# Patient Record
Sex: FEMALE | Race: WHITE | NOT HISPANIC OR LATINO | Employment: OTHER | URBAN - METROPOLITAN AREA
[De-identification: names, ages, dates, MRNs, and addresses within clinical notes are randomized per-mention and may not be internally consistent; named-entity substitution may affect disease eponyms.]

---

## 2017-01-12 ENCOUNTER — TRANSCRIBE ORDERS (OUTPATIENT)
Dept: LAB | Facility: HOSPITAL | Age: 72
End: 2017-01-12

## 2017-01-12 ENCOUNTER — APPOINTMENT (OUTPATIENT)
Dept: LAB | Facility: HOSPITAL | Age: 72
End: 2017-01-12
Attending: INTERNAL MEDICINE
Payer: COMMERCIAL

## 2017-01-12 DIAGNOSIS — N18.4 CHRONIC KIDNEY DISEASE, STAGE IV (SEVERE) (HCC): ICD-10-CM

## 2017-01-12 DIAGNOSIS — I26.92 SADDLE EMBOLUS OF PULMONARY ARTERY WITHOUT ACUTE COR PULMONALE (HCC): ICD-10-CM

## 2017-01-12 LAB
ALBUMIN SERPL BCP-MCNC: 3.6 G/DL (ref 3.5–5)
ALP SERPL-CCNC: 179 U/L (ref 46–116)
ALT SERPL W P-5'-P-CCNC: 16 U/L (ref 12–78)
ANION GAP SERPL CALCULATED.3IONS-SCNC: 8 MMOL/L (ref 4–13)
AST SERPL W P-5'-P-CCNC: 27 U/L (ref 5–45)
BASOPHILS # BLD AUTO: 0.24 THOUSANDS/ΜL (ref 0–0.1)
BASOPHILS NFR BLD AUTO: 3 % (ref 0–1)
BILIRUB SERPL-MCNC: 0.38 MG/DL (ref 0.2–1)
BUN SERPL-MCNC: 50 MG/DL (ref 5–25)
CALCIUM SERPL-MCNC: 8.6 MG/DL (ref 8.3–10.1)
CHLORIDE SERPL-SCNC: 114 MMOL/L (ref 100–108)
CO2 SERPL-SCNC: 18 MMOL/L (ref 21–32)
CREAT SERPL-MCNC: 3.08 MG/DL (ref 0.6–1.3)
EOSINOPHIL # BLD AUTO: 0.26 THOUSAND/ΜL (ref 0–0.61)
EOSINOPHIL NFR BLD AUTO: 3 % (ref 0–6)
ERYTHROCYTE [DISTWIDTH] IN BLOOD BY AUTOMATED COUNT: 17.7 % (ref 11.6–15.1)
GFR SERPL CREATININE-BSD FRML MDRD: 15 ML/MIN/1.73SQ M
GLUCOSE SERPL-MCNC: 96 MG/DL (ref 65–140)
HCT VFR BLD AUTO: 35.1 % (ref 34.8–46.1)
HGB BLD-MCNC: 11.4 G/DL (ref 11.5–15.4)
LYMPHOCYTES # BLD AUTO: 1.79 THOUSANDS/ΜL (ref 0.6–4.47)
LYMPHOCYTES NFR BLD AUTO: 19 % (ref 14–44)
MCH RBC QN AUTO: 27.9 PG (ref 26.8–34.3)
MCHC RBC AUTO-ENTMCNC: 32.5 G/DL (ref 31.4–37.4)
MCV RBC AUTO: 86 FL (ref 82–98)
MONOCYTES # BLD AUTO: 0.29 THOUSAND/ΜL (ref 0.17–1.22)
MONOCYTES NFR BLD AUTO: 3 % (ref 4–12)
NEUTROPHILS # BLD AUTO: 6.7 THOUSANDS/ΜL (ref 1.85–7.62)
NEUTS SEG NFR BLD AUTO: 72 % (ref 43–75)
NRBC BLD AUTO-RTO: 0 /100 WBCS
PHOSPHATE SERPL-MCNC: 4.1 MG/DL (ref 2.3–4.1)
PLATELET # BLD AUTO: 321 THOUSANDS/UL (ref 149–390)
PMV BLD AUTO: 10.5 FL (ref 8.9–12.7)
POTASSIUM SERPL-SCNC: 4.4 MMOL/L (ref 3.5–5.3)
PROT SERPL-MCNC: 7.3 G/DL (ref 6.4–8.2)
PTH-INTACT SERPL-MCNC: 182.4 PG/ML (ref 14–72)
RBC # BLD AUTO: 4.09 MILLION/UL (ref 3.81–5.12)
SODIUM SERPL-SCNC: 140 MMOL/L (ref 136–145)
WBC # BLD AUTO: 9.37 THOUSAND/UL (ref 4.31–10.16)

## 2017-01-12 PROCEDURE — 80053 COMPREHEN METABOLIC PANEL: CPT

## 2017-01-12 PROCEDURE — 83970 ASSAY OF PARATHORMONE: CPT

## 2017-01-12 PROCEDURE — 36415 COLL VENOUS BLD VENIPUNCTURE: CPT

## 2017-01-12 PROCEDURE — 84100 ASSAY OF PHOSPHORUS: CPT

## 2017-01-12 PROCEDURE — 85025 COMPLETE CBC W/AUTO DIFF WBC: CPT

## 2017-01-17 ENCOUNTER — ALLSCRIPTS OFFICE VISIT (OUTPATIENT)
Dept: OTHER | Facility: OTHER | Age: 72
End: 2017-01-17

## 2017-01-17 DIAGNOSIS — D45 POLYCYTHEMIA VERA (HCC): ICD-10-CM

## 2017-01-23 ENCOUNTER — ALLSCRIPTS OFFICE VISIT (OUTPATIENT)
Dept: OTHER | Facility: OTHER | Age: 72
End: 2017-01-23

## 2017-01-23 ENCOUNTER — GENERIC CONVERSION - ENCOUNTER (OUTPATIENT)
Dept: OTHER | Facility: OTHER | Age: 72
End: 2017-01-23

## 2017-02-24 ENCOUNTER — GENERIC CONVERSION - ENCOUNTER (OUTPATIENT)
Dept: OTHER | Facility: OTHER | Age: 72
End: 2017-02-24

## 2017-03-20 ENCOUNTER — APPOINTMENT (OUTPATIENT)
Dept: LAB | Facility: HOSPITAL | Age: 72
End: 2017-03-20
Attending: INTERNAL MEDICINE
Payer: COMMERCIAL

## 2017-03-20 ENCOUNTER — TRANSCRIBE ORDERS (OUTPATIENT)
Dept: LAB | Facility: HOSPITAL | Age: 72
End: 2017-03-20

## 2017-03-20 DIAGNOSIS — I10 ESSENTIAL HYPERTENSION, MALIGNANT: Primary | ICD-10-CM

## 2017-03-20 DIAGNOSIS — R19.7 DIARRHEA, UNSPECIFIED TYPE: ICD-10-CM

## 2017-03-20 DIAGNOSIS — N18.4 CHRONIC KIDNEY DISEASE, STAGE IV (SEVERE) (HCC): ICD-10-CM

## 2017-03-20 DIAGNOSIS — I10 ESSENTIAL HYPERTENSION, MALIGNANT: ICD-10-CM

## 2017-03-20 DIAGNOSIS — D45 POLYCYTHEMIA VERA(238.4): ICD-10-CM

## 2017-03-20 LAB
ALBUMIN SERPL BCP-MCNC: 3.6 G/DL (ref 3.5–5)
ANION GAP SERPL CALCULATED.3IONS-SCNC: 10 MMOL/L (ref 4–13)
BUN SERPL-MCNC: 69 MG/DL (ref 5–25)
CALCIUM SERPL-MCNC: 8.7 MG/DL (ref 8.3–10.1)
CHLORIDE SERPL-SCNC: 114 MMOL/L (ref 100–108)
CO2 SERPL-SCNC: 19 MMOL/L (ref 21–32)
CREAT SERPL-MCNC: 3.36 MG/DL (ref 0.6–1.3)
GFR SERPL CREATININE-BSD FRML MDRD: 13.5 ML/MIN/1.73SQ M
GLUCOSE SERPL-MCNC: 98 MG/DL (ref 65–140)
PHOSPHATE SERPL-MCNC: 4.4 MG/DL (ref 2.3–4.1)
POTASSIUM SERPL-SCNC: 4.2 MMOL/L (ref 3.5–5.3)
PTH-INTACT SERPL-MCNC: 123.7 PG/ML (ref 14–72)
SODIUM SERPL-SCNC: 143 MMOL/L (ref 136–145)

## 2017-03-20 PROCEDURE — 83970 ASSAY OF PARATHORMONE: CPT

## 2017-03-20 PROCEDURE — 80069 RENAL FUNCTION PANEL: CPT

## 2017-03-20 PROCEDURE — 36415 COLL VENOUS BLD VENIPUNCTURE: CPT

## 2017-03-29 ENCOUNTER — GENERIC CONVERSION - ENCOUNTER (OUTPATIENT)
Dept: OTHER | Facility: OTHER | Age: 72
End: 2017-03-29

## 2017-04-10 ENCOUNTER — APPOINTMENT (OUTPATIENT)
Dept: LAB | Facility: HOSPITAL | Age: 72
End: 2017-04-10
Attending: INTERNAL MEDICINE
Payer: COMMERCIAL

## 2017-04-10 DIAGNOSIS — R19.7 DIARRHEA, UNSPECIFIED TYPE: ICD-10-CM

## 2017-04-10 DIAGNOSIS — D45 POLYCYTHEMIA VERA (HCC): ICD-10-CM

## 2017-04-10 LAB
ALBUMIN SERPL BCP-MCNC: 3.4 G/DL (ref 3.5–5)
ALP SERPL-CCNC: 98 U/L (ref 46–116)
ALT SERPL W P-5'-P-CCNC: 15 U/L (ref 12–78)
ANION GAP SERPL CALCULATED.3IONS-SCNC: 9 MMOL/L (ref 4–13)
AST SERPL W P-5'-P-CCNC: 22 U/L (ref 5–45)
BASOPHILS # BLD AUTO: 0.2 THOUSANDS/ΜL (ref 0–0.1)
BASOPHILS NFR BLD AUTO: 2 % (ref 0–1)
BILIRUB SERPL-MCNC: 0.37 MG/DL (ref 0.2–1)
BUN SERPL-MCNC: 43 MG/DL (ref 5–25)
CALCIUM SERPL-MCNC: 8.3 MG/DL (ref 8.3–10.1)
CHLORIDE SERPL-SCNC: 113 MMOL/L (ref 100–108)
CO2 SERPL-SCNC: 19 MMOL/L (ref 21–32)
CREAT SERPL-MCNC: 3.09 MG/DL (ref 0.6–1.3)
EOSINOPHIL # BLD AUTO: 0.2 THOUSAND/ΜL (ref 0–0.61)
EOSINOPHIL NFR BLD AUTO: 2 % (ref 0–6)
ERYTHROCYTE [DISTWIDTH] IN BLOOD BY AUTOMATED COUNT: 16.4 % (ref 11.6–15.1)
GFR SERPL CREATININE-BSD FRML MDRD: 14.9 ML/MIN/1.73SQ M
GLUCOSE P FAST SERPL-MCNC: 91 MG/DL (ref 65–99)
HCT VFR BLD AUTO: 31.5 % (ref 34.8–46.1)
HGB BLD-MCNC: 10.3 G/DL (ref 11.5–15.4)
LYMPHOCYTES # BLD AUTO: 1.7 THOUSANDS/ΜL (ref 0.6–4.47)
LYMPHOCYTES NFR BLD AUTO: 19 % (ref 14–44)
MCH RBC QN AUTO: 28.9 PG (ref 26.8–34.3)
MCHC RBC AUTO-ENTMCNC: 32.7 G/DL (ref 31.4–37.4)
MCV RBC AUTO: 88 FL (ref 82–98)
MONOCYTES # BLD AUTO: 0.31 THOUSAND/ΜL (ref 0.17–1.22)
MONOCYTES NFR BLD AUTO: 3 % (ref 4–12)
NEUTROPHILS # BLD AUTO: 6.62 THOUSANDS/ΜL (ref 1.85–7.62)
NEUTS SEG NFR BLD AUTO: 74 % (ref 43–75)
NRBC BLD AUTO-RTO: 0 /100 WBCS
PLATELET # BLD AUTO: 281 THOUSANDS/UL (ref 149–390)
PMV BLD AUTO: 10.3 FL (ref 8.9–12.7)
POTASSIUM SERPL-SCNC: 4.3 MMOL/L (ref 3.5–5.3)
PROT SERPL-MCNC: 6.6 G/DL (ref 6.4–8.2)
RBC # BLD AUTO: 3.57 MILLION/UL (ref 3.81–5.12)
SODIUM SERPL-SCNC: 141 MMOL/L (ref 136–145)
WBC # BLD AUTO: 9.08 THOUSAND/UL (ref 4.31–10.16)

## 2017-04-10 PROCEDURE — 36415 COLL VENOUS BLD VENIPUNCTURE: CPT

## 2017-04-10 PROCEDURE — 85025 COMPLETE CBC W/AUTO DIFF WBC: CPT

## 2017-04-10 PROCEDURE — 80053 COMPREHEN METABOLIC PANEL: CPT

## 2017-04-11 ENCOUNTER — APPOINTMENT (OUTPATIENT)
Dept: LAB | Facility: HOSPITAL | Age: 72
End: 2017-04-11
Attending: INTERNAL MEDICINE
Payer: COMMERCIAL

## 2017-04-11 ENCOUNTER — TRANSCRIBE ORDERS (OUTPATIENT)
Dept: LAB | Facility: HOSPITAL | Age: 72
End: 2017-04-11

## 2017-04-11 DIAGNOSIS — R19.7 DIARRHEA, UNSPECIFIED TYPE: Primary | ICD-10-CM

## 2017-04-11 DIAGNOSIS — R19.7 DIARRHEA, UNSPECIFIED TYPE: ICD-10-CM

## 2017-04-11 LAB — C DIFF TOX GENS STL QL NAA+PROBE: ABNORMAL

## 2017-04-11 PROCEDURE — 87493 C DIFF AMPLIFIED PROBE: CPT

## 2017-04-18 ENCOUNTER — ALLSCRIPTS OFFICE VISIT (OUTPATIENT)
Dept: OTHER | Facility: OTHER | Age: 72
End: 2017-04-18

## 2017-04-21 ENCOUNTER — ALLSCRIPTS OFFICE VISIT (OUTPATIENT)
Dept: OTHER | Facility: OTHER | Age: 72
End: 2017-04-21

## 2017-05-30 ENCOUNTER — HOSPITAL ENCOUNTER (OUTPATIENT)
Dept: RADIOLOGY | Facility: HOSPITAL | Age: 72
Discharge: HOME | End: 2017-05-30
Attending: UROLOGY
Payer: COMMERCIAL

## 2017-05-30 DIAGNOSIS — N18.4 CHRONIC KIDNEY DISEASE, STAGE IV (SEVERE) (HCC): ICD-10-CM

## 2017-05-30 PROCEDURE — 76770 US EXAM ABDO BACK WALL COMP: CPT

## 2017-06-01 ENCOUNTER — TRANSCRIBE ORDERS (OUTPATIENT)
Dept: LAB | Facility: HOSPITAL | Age: 72
End: 2017-06-01

## 2017-06-01 ENCOUNTER — APPOINTMENT (OUTPATIENT)
Dept: LAB | Facility: HOSPITAL | Age: 72
End: 2017-06-01
Attending: INTERNAL MEDICINE
Payer: COMMERCIAL

## 2017-06-01 ENCOUNTER — GENERIC CONVERSION - ENCOUNTER (OUTPATIENT)
Dept: OTHER | Facility: OTHER | Age: 72
End: 2017-06-01

## 2017-06-01 DIAGNOSIS — N18.4 CHRONIC KIDNEY DISEASE, STAGE IV (SEVERE) (HCC): ICD-10-CM

## 2017-06-01 LAB
ALBUMIN SERPL BCP-MCNC: 3.6 G/DL (ref 3.5–5)
ANION GAP SERPL CALCULATED.3IONS-SCNC: 6 MMOL/L (ref 4–13)
BASOPHILS # BLD AUTO: 0.15 THOUSANDS/ΜL (ref 0–0.1)
BASOPHILS NFR BLD AUTO: 2 % (ref 0–1)
BUN SERPL-MCNC: 46 MG/DL (ref 5–25)
CALCIUM SERPL-MCNC: 8.8 MG/DL (ref 8.3–10.1)
CHLORIDE SERPL-SCNC: 112 MMOL/L (ref 100–108)
CO2 SERPL-SCNC: 23 MMOL/L (ref 21–32)
CREAT SERPL-MCNC: 3.01 MG/DL (ref 0.6–1.3)
EOSINOPHIL # BLD AUTO: 0.19 THOUSAND/ΜL (ref 0–0.61)
EOSINOPHIL NFR BLD AUTO: 3 % (ref 0–6)
ERYTHROCYTE [DISTWIDTH] IN BLOOD BY AUTOMATED COUNT: 15.4 % (ref 11.6–15.1)
GFR SERPL CREATININE-BSD FRML MDRD: 15.3 ML/MIN/1.73SQ M
GLUCOSE P FAST SERPL-MCNC: 85 MG/DL (ref 65–99)
HCT VFR BLD AUTO: 33.5 % (ref 34.8–46.1)
HGB BLD-MCNC: 11 G/DL (ref 11.5–15.4)
LYMPHOCYTES # BLD AUTO: 1.17 THOUSANDS/ΜL (ref 0.6–4.47)
LYMPHOCYTES NFR BLD AUTO: 16 % (ref 14–44)
MCH RBC QN AUTO: 29.2 PG (ref 26.8–34.3)
MCHC RBC AUTO-ENTMCNC: 32.8 G/DL (ref 31.4–37.4)
MCV RBC AUTO: 89 FL (ref 82–98)
MONOCYTES # BLD AUTO: 0.28 THOUSAND/ΜL (ref 0.17–1.22)
MONOCYTES NFR BLD AUTO: 4 % (ref 4–12)
NEUTROPHILS # BLD AUTO: 5.69 THOUSANDS/ΜL (ref 1.85–7.62)
NEUTS SEG NFR BLD AUTO: 75 % (ref 43–75)
NRBC BLD AUTO-RTO: 0 /100 WBCS
PHOSPHATE SERPL-MCNC: 4.2 MG/DL (ref 2.3–4.1)
PLATELET # BLD AUTO: 285 THOUSANDS/UL (ref 149–390)
PMV BLD AUTO: 10.4 FL (ref 8.9–12.7)
POTASSIUM SERPL-SCNC: 4.3 MMOL/L (ref 3.5–5.3)
PTH-INTACT SERPL-MCNC: 128.1 PG/ML (ref 14–72)
RBC # BLD AUTO: 3.77 MILLION/UL (ref 3.81–5.12)
SODIUM SERPL-SCNC: 141 MMOL/L (ref 136–145)
WBC # BLD AUTO: 7.52 THOUSAND/UL (ref 4.31–10.16)

## 2017-06-01 PROCEDURE — 80069 RENAL FUNCTION PANEL: CPT

## 2017-06-01 PROCEDURE — 85025 COMPLETE CBC W/AUTO DIFF WBC: CPT

## 2017-06-01 PROCEDURE — 36415 COLL VENOUS BLD VENIPUNCTURE: CPT

## 2017-06-01 PROCEDURE — 83970 ASSAY OF PARATHORMONE: CPT

## 2017-06-20 ENCOUNTER — TRANSCRIBE ORDERS (OUTPATIENT)
Dept: ADMINISTRATIVE | Facility: HOSPITAL | Age: 72
End: 2017-06-20

## 2017-06-20 ENCOUNTER — ALLSCRIPTS OFFICE VISIT (OUTPATIENT)
Dept: OTHER | Facility: OTHER | Age: 72
End: 2017-06-20

## 2017-06-20 DIAGNOSIS — N13.30 HYDRONEPHROSIS, UNSPECIFIED HYDRONEPHROSIS TYPE: Primary | ICD-10-CM

## 2017-07-07 ENCOUNTER — ALLSCRIPTS OFFICE VISIT (OUTPATIENT)
Dept: OTHER | Facility: OTHER | Age: 72
End: 2017-07-07

## 2017-07-13 DIAGNOSIS — D45 POLYCYTHEMIA VERA(238.4): ICD-10-CM

## 2017-07-14 ENCOUNTER — TRANSCRIBE ORDERS (OUTPATIENT)
Dept: LAB | Facility: HOSPITAL | Age: 72
End: 2017-07-14

## 2017-07-14 ENCOUNTER — APPOINTMENT (OUTPATIENT)
Dept: LAB | Facility: HOSPITAL | Age: 72
End: 2017-07-14
Attending: INTERNAL MEDICINE
Payer: COMMERCIAL

## 2017-07-14 DIAGNOSIS — D45 POLYCYTHEMIA VERA(238.4): ICD-10-CM

## 2017-07-14 LAB
ALBUMIN SERPL BCP-MCNC: 3.5 G/DL (ref 3.5–5)
ALP SERPL-CCNC: 129 U/L (ref 46–116)
ALT SERPL W P-5'-P-CCNC: 18 U/L (ref 12–78)
ANION GAP SERPL CALCULATED.3IONS-SCNC: 9 MMOL/L (ref 4–13)
AST SERPL W P-5'-P-CCNC: 27 U/L (ref 5–45)
BASOPHILS # BLD MANUAL: 0.13 THOUSAND/UL (ref 0–0.1)
BASOPHILS NFR MAR MANUAL: 2 % (ref 0–1)
BILIRUB SERPL-MCNC: 0.53 MG/DL (ref 0.2–1)
BUN SERPL-MCNC: 46 MG/DL (ref 5–25)
CALCIUM SERPL-MCNC: 8.7 MG/DL (ref 8.3–10.1)
CHLORIDE SERPL-SCNC: 112 MMOL/L (ref 100–108)
CO2 SERPL-SCNC: 19 MMOL/L (ref 21–32)
CREAT SERPL-MCNC: 3.08 MG/DL (ref 0.6–1.3)
EOSINOPHIL # BLD MANUAL: 0.07 THOUSAND/UL (ref 0–0.4)
EOSINOPHIL NFR BLD MANUAL: 1 % (ref 0–6)
ERYTHROCYTE [DISTWIDTH] IN BLOOD BY AUTOMATED COUNT: 15.1 % (ref 11.6–15.1)
GFR SERPL CREATININE-BSD FRML MDRD: 14.9 ML/MIN/1.73SQ M
GLUCOSE P FAST SERPL-MCNC: 85 MG/DL (ref 65–99)
HCT VFR BLD AUTO: 34.1 % (ref 34.8–46.1)
HGB BLD-MCNC: 11.2 G/DL (ref 11.5–15.4)
LYMPHOCYTES # BLD AUTO: 0.93 THOUSAND/UL (ref 0.6–4.47)
LYMPHOCYTES # BLD AUTO: 14 % (ref 14–44)
MCH RBC QN AUTO: 29.6 PG (ref 26.8–34.3)
MCHC RBC AUTO-ENTMCNC: 32.8 G/DL (ref 31.4–37.4)
MCV RBC AUTO: 90 FL (ref 82–98)
METAMYELOCYTES NFR BLD MANUAL: 1 % (ref 0–1)
MONOCYTES # BLD AUTO: 0.2 THOUSAND/UL (ref 0–1.22)
MONOCYTES NFR BLD: 3 % (ref 4–12)
NEUTROPHILS # BLD MANUAL: 5.26 THOUSAND/UL (ref 1.85–7.62)
NEUTS BAND NFR BLD MANUAL: 1 % (ref 0–8)
NEUTS SEG NFR BLD AUTO: 78 % (ref 43–75)
NRBC BLD AUTO-RTO: 0 /100 WBCS
PLATELET # BLD AUTO: 279 THOUSANDS/UL (ref 149–390)
PLATELET BLD QL SMEAR: ADEQUATE
PMV BLD AUTO: 10.7 FL (ref 8.9–12.7)
POIKILOCYTOSIS BLD QL SMEAR: PRESENT
POLYCHROMASIA BLD QL SMEAR: PRESENT
POTASSIUM SERPL-SCNC: 4.3 MMOL/L (ref 3.5–5.3)
PROT SERPL-MCNC: 6.9 G/DL (ref 6.4–8.2)
RBC # BLD AUTO: 3.78 MILLION/UL (ref 3.81–5.12)
RBC MORPH BLD: PRESENT
SODIUM SERPL-SCNC: 140 MMOL/L (ref 136–145)
WBC # BLD AUTO: 6.66 THOUSAND/UL (ref 4.31–10.16)

## 2017-07-14 PROCEDURE — 80053 COMPREHEN METABOLIC PANEL: CPT

## 2017-07-14 PROCEDURE — 85027 COMPLETE CBC AUTOMATED: CPT

## 2017-07-14 PROCEDURE — 36415 COLL VENOUS BLD VENIPUNCTURE: CPT

## 2017-07-14 PROCEDURE — 85007 BL SMEAR W/DIFF WBC COUNT: CPT

## 2017-07-18 ENCOUNTER — ALLSCRIPTS OFFICE VISIT (OUTPATIENT)
Dept: OTHER | Facility: OTHER | Age: 72
End: 2017-07-18

## 2017-08-21 ENCOUNTER — GENERIC CONVERSION - ENCOUNTER (OUTPATIENT)
Dept: OTHER | Facility: OTHER | Age: 72
End: 2017-08-21

## 2017-08-31 ENCOUNTER — GENERIC CONVERSION - ENCOUNTER (OUTPATIENT)
Dept: OTHER | Facility: OTHER | Age: 72
End: 2017-08-31

## 2017-10-01 DIAGNOSIS — N18.4 CHRONIC KIDNEY DISEASE, STAGE IV (SEVERE) (HCC): ICD-10-CM

## 2017-10-01 DIAGNOSIS — D45 POLYCYTHEMIA VERA (HCC): ICD-10-CM

## 2017-10-13 ENCOUNTER — APPOINTMENT (OUTPATIENT)
Dept: LAB | Facility: HOSPITAL | Age: 72
End: 2017-10-13
Attending: INTERNAL MEDICINE
Payer: COMMERCIAL

## 2017-10-13 ENCOUNTER — TRANSCRIBE ORDERS (OUTPATIENT)
Dept: LAB | Facility: HOSPITAL | Age: 72
End: 2017-10-13

## 2017-10-13 DIAGNOSIS — D45 POLYCYTHEMIA VERA (HCC): ICD-10-CM

## 2017-10-13 DIAGNOSIS — N18.4 CHRONIC KIDNEY DISEASE, STAGE IV (SEVERE) (HCC): ICD-10-CM

## 2017-10-13 LAB
ALBUMIN SERPL BCP-MCNC: 3.7 G/DL (ref 3.5–5)
ALP SERPL-CCNC: 90 U/L (ref 46–116)
ALT SERPL W P-5'-P-CCNC: 15 U/L (ref 12–78)
ANION GAP SERPL CALCULATED.3IONS-SCNC: 9 MMOL/L (ref 4–13)
AST SERPL W P-5'-P-CCNC: 23 U/L (ref 5–45)
BASOPHILS # BLD AUTO: 0.08 THOUSANDS/ΜL (ref 0–0.1)
BASOPHILS NFR BLD AUTO: 1 % (ref 0–1)
BILIRUB SERPL-MCNC: 0.52 MG/DL (ref 0.2–1)
BUN SERPL-MCNC: 47 MG/DL (ref 5–25)
CALCIUM SERPL-MCNC: 8.7 MG/DL (ref 8.3–10.1)
CHLORIDE SERPL-SCNC: 109 MMOL/L (ref 100–108)
CO2 SERPL-SCNC: 22 MMOL/L (ref 21–32)
CREAT SERPL-MCNC: 2.93 MG/DL (ref 0.6–1.3)
EOSINOPHIL # BLD AUTO: 0.1 THOUSAND/ΜL (ref 0–0.61)
EOSINOPHIL NFR BLD AUTO: 2 % (ref 0–6)
ERYTHROCYTE [DISTWIDTH] IN BLOOD BY AUTOMATED COUNT: 14.5 % (ref 11.6–15.1)
GFR SERPL CREATININE-BSD FRML MDRD: 15 ML/MIN/1.73SQ M
GLUCOSE P FAST SERPL-MCNC: 92 MG/DL (ref 65–99)
HCT VFR BLD AUTO: 34.9 % (ref 34.8–46.1)
HGB BLD-MCNC: 11.4 G/DL (ref 11.5–15.4)
LYMPHOCYTES # BLD AUTO: 1.37 THOUSANDS/ΜL (ref 0.6–4.47)
LYMPHOCYTES NFR BLD AUTO: 23 % (ref 14–44)
MCH RBC QN AUTO: 29.3 PG (ref 26.8–34.3)
MCHC RBC AUTO-ENTMCNC: 32.7 G/DL (ref 31.4–37.4)
MCV RBC AUTO: 90 FL (ref 82–98)
MONOCYTES # BLD AUTO: 0.17 THOUSAND/ΜL (ref 0.17–1.22)
MONOCYTES NFR BLD AUTO: 3 % (ref 4–12)
NEUTROPHILS # BLD AUTO: 4.14 THOUSANDS/ΜL (ref 1.85–7.62)
NEUTS SEG NFR BLD AUTO: 71 % (ref 43–75)
NRBC BLD AUTO-RTO: 0 /100 WBCS
PHOSPHATE SERPL-MCNC: 4 MG/DL (ref 2.3–4.1)
PLATELET # BLD AUTO: 281 THOUSANDS/UL (ref 149–390)
PMV BLD AUTO: 10 FL (ref 8.9–12.7)
POTASSIUM SERPL-SCNC: 4.3 MMOL/L (ref 3.5–5.3)
PROT SERPL-MCNC: 6.9 G/DL (ref 6.4–8.2)
PTH-INTACT SERPL-MCNC: 143.9 PG/ML (ref 14–72)
RBC # BLD AUTO: 3.89 MILLION/UL (ref 3.81–5.12)
SODIUM SERPL-SCNC: 140 MMOL/L (ref 136–145)
WBC # BLD AUTO: 5.9 THOUSAND/UL (ref 4.31–10.16)

## 2017-10-13 PROCEDURE — 83970 ASSAY OF PARATHORMONE: CPT

## 2017-10-13 PROCEDURE — 36415 COLL VENOUS BLD VENIPUNCTURE: CPT

## 2017-10-13 PROCEDURE — 80053 COMPREHEN METABOLIC PANEL: CPT

## 2017-10-13 PROCEDURE — 85025 COMPLETE CBC W/AUTO DIFF WBC: CPT

## 2017-10-13 PROCEDURE — 84100 ASSAY OF PHOSPHORUS: CPT

## 2017-10-17 ENCOUNTER — ALLSCRIPTS OFFICE VISIT (OUTPATIENT)
Dept: OTHER | Facility: OTHER | Age: 72
End: 2017-10-17

## 2017-11-01 NOTE — PROGRESS NOTES
Assessment  1  Polycythemia vera (238 4) (D45)    Plan  Polycythemia vera    · (1) CBC/PLT/DIFF; Status:Active; Requested ZKW:63ACO1679;    Perform:[facility]; Due:17Oct2018; Ordered; For:Polycythemia vera; Ordered By:Rahul Henry);   · (1) COMPREHENSIVE METABOLIC PANEL; Status:Active; Requested SRP:47UIY3486;    Perform:[facility]; Due:17Oct2018; Ordered;vera; Ordered By:Rahul Henry); · Follow-up visit in 6 months Evaluation and Treatment  Follow-up  Status: Hold For -Scheduling  Requested for: 27UQB0651   Ordered; Polycythemia vera; Ordered By: Polo New) Performed:  Due: 97XNX8952    Discussion/Summary  Discussion Summary:   1  Polycythemia vera positive for JAK2 mutation, on approximately day 10 mg p o  daily since July 2016 with excellent tolerance, disappearance of splenomegaly, improvement of constitutional symptoms, continue treatmentHistory of PE, massive, on apixaban 2 5 mg p o  daily because of chronic kidney disease grade 3-4, no evidence of recurrent DVT or PEFollow-up in 6 months with CBC, CMP  Chief Complaint  Chief Complaint: Chief Complaint:  The patient presents to the office today with Follow Up  History of Present Illness  HPI: #E#1  History of polycythemia vera diagnosed in 2006 with thrombocytosis, erythrocytosis, when she presented with hemoglobin of 20 6, hematocrit 54  Positive for CLARE 2 mutation diagnosis was done at [facility], on hydroxyurea  She also had phlebotomy previously  Chronic left lower extremity deep venous thrombosis diagnosed in 2007, treated with CoumadinLarge pulmonary embolus in the right upper lobe branches, currently on Coumadin to keep INR between 2 and 3 managed by Dr Randall Alvarez  7/19/17: Currently on apixaban 2 5 mg by mouth daily,Status post cystectomy with ileal conduit 2nd to her chronic hydronephrosis   post removal of IVC filter 12/2016Because of constitutional symptoms, fatigue, pruritus, switching patient initiated on Ruxolitinib 10 mg by mouth twice a day in June 2016, she underwent bladder resection with ileostomy, currently on Arixtra 10 80 mg p o  daily   Current Therapy: Ruxolitinib 10 mg by mouth daily initiated in January 2016      Review of Systems  Complete-Female:  Constitutional: feeling tired, but-- no fever,-- not feeling poorly-- and-- no recent weight loss  Eyes: No complaints of eye pain, no red eyes, no eyesight problems, no discharge, no dry eyes, no itching of eyes  ENT: no complaints of earache, no loss of hearing, no nose bleeds, no nasal discharge, no sore throat, no hoarseness  Cardiovascular: No complaints of slow heart rate, no fast heart rate, no chest pain, no palpitations, no leg claudication, no lower extremity edema  Respiratory: shortness of breath during exertion, but-- No complaints of shortness of breath, no wheezing, no cough, no SOB on exertion, no orthopnea, no PND  Gastrointestinal: No complaints of abdominal pain, no constipation, no nausea or vomiting, no diarrhea, no bloody stools,-- no nausea,-- no vomiting,-- no diarrhea,-- no blood in stools,-- not vomiting blood, not vomiting 'coffee grounds' material and no maroon stools--   The patient presents with complaints of no abdominal pain  Genitourinary: no incontinence  Musculoskeletal: No complaints of arthralgias, no myalgias, no joint swelling or stiffness, no limb pain or swelling  Integumentary: no itching  Neurological: no confusion  Psychiatric: Not suicidal, no sleep disturbance, no anxiety or depression, no change in personality, no emotional problems  Endocrine: No complaints of proptosis, no hot flashes, no muscle weakness, no deepening of the voice, no feelings of weakness  Hematologic/Lymphatic: a tendency for easy bruising  Active Problems    1  Acidosis, metabolic (582 4) (L38 3)   2   Acute embolism and thrombosis of unspecified deep veins of unspecified lower extremity (453 40) (I82 409)   3  Acute renal insufficiency (593 9) (N28 9)   4  Anemia in CKD (chronic kidney disease) (285 21) (N18 9,D63 1)   5  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) (I26 92)   6  CKD (chronic kidney disease), stage IV (585 4) (N18 4)   7  Encounter for routine gynecological examination (V72 31) (Z01 419)   8  Hydronephrosis (591) (N13 30)   9  Hypertension (401 9) (I10)   10  Hypothyroidism (244 9) (E03 9)   11  Mass of urethra (599 84) (N36 8)   12  Pap smear, as part of routine gynecological examination (V76 2) (Z01 419)   13  Pelvic mass in female (789 30) (R19 00)   14  Polycythemia vera (238 4) (D45)   15  Postmenopausal bleeding (627 1) (N95 0)   16  Pruritus (698 9) (L29 9)   17  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)   18  Urgency of urination (788 63) (R39 15)   19  Urinary retention (788 20) (R33 9)   20  Visit for screening mammogram (V76 12) (Z12 31)    Past Medical History    1  History of Encounter for routine gynecological examination (2 31) (Z01 419)   2  History of  3 (V22 2) (Z33 1)   3  History of Papanicolaou smear (V45 89) (Z98 890)   4  History of Melanoma of nose (172 3) (C43 31)   5  History of Pap smear, as part of routine gynecological examination (V76 2) (Z01 419)   6  History of Visit for screening mammogram (V76 12) (Z12 31)    Surgical History  1  History of Biopsy Vaginal Extensive   2  History of Bladder Surgery   3  History of Complex Bladder Flow Rate Studies   4  History of Cystoscopy With Biopsy   5  History of Cystoscopy With Insertion Of Ureteral Stent Right   6  History of Cystoscopy With Removal Of Object   7  History of Diagnostic Cystoscopy   8  History of Gallbladder Surgery    Family History  Mother    1  Family history of hypertension (V17 49) (Z82 49)   2  Family history of liver cancer (V16 0) (Z80 0)  Father    3  Family history of cardiac disorder (V17 49) (Z82 49)   4   Family history of emphysema (V17 6) (Z82 5)    Social History     · Always uses seat belt   · Daily caffeine consumption, 1 serving a day   · Does not exercise (V69 0) (Z72 3)   ·    · Never a smoker   · No alcohol use   · No drug use   · Non-smoker (V49 89) (Z78 9)   · Retired    Current Meds   1  AmLODIPine Besylate 10 MG Oral Tablet; TAKE 1 TABLET DAILY  Requested for: 28Cya4291; Last Rx:92Nwj4589 Ordered   2  Calcitriol 0 25 MCG Oral Capsule; take 1 capsule daily; Therapy: 59LPM8071 to (Evaluate:18Jan2018)  Requested for: 02RMC2605; Last Rx:23Jan2017 Ordered   3  Eliquis 2 5 MG Oral Tablet; Take 1 tablet daily; Therapy: 87YYN0760 to  Requested for: 93KJN1132 Recorded   4  Florastor 250 MG Oral Capsule; TAKE 1 CAPSULE TWICE DAILY; Therapy: (Recorded:52Udh1867) to Recorded   5  Jakafi 10 MG Oral Tablet; TAKE 1 TABLET BY MOUTH ONE TIME DAILY; Therapy: 35FJO6274 to ()  Requested for: 92Qgu8251; Last Rx:78Rrl9050 Ordered   6  Levothyroxine Sodium 75 MCG Oral Tablet; TAKE 1 TABLET DAILY AS DIRECTED; Therapy: (Recorded:65Xtr5236) to Recorded   7  Metoprolol Tartrate 25 MG Oral Tablet; TAKE 0 5 TABLET Twice daily; Therapy: 31GTM3477 to Recorded   8  Preorbotic Oral Capsule; Therapy: (Recorded:17Ony9223) to Recorded   9  Sodium Bicarbonate 650 MG Oral Tablet; TAKE 1 TABLET TWICE DAILY WITH MEALS; Therapy: 88NGP5145 to (Evaluate:18Oct2017)  Requested for: 98IPE9229 Recorded   10  Vitamin D3 1000 UNIT Oral Tablet; TAKE 1 TABLET DAILY; Therapy: (Recorded:59Zgr2539) to Recorded    Allergies  1  No Known Drug Allergies    Vitals  Vital Signs    Recorded: 81OPA0012 10:09AM   Temperature 97 F   Heart Rate 68   Respiration 16   Systolic 052   Diastolic 80   Weight 548 lb    BMI Calculated 33 59   BSA Calculated 1 75       Physical Exam   Constitutional  General appearance: No acute distress, well appearing and well nourished  Eyes  Conjunctiva and lids: No swelling, erythema or discharge     Pupils and irises: Equal, round and reactive to light  Ears, Nose, Mouth, and Throat  External inspection of ears and nose: Normal    Oropharynx: Normal with no erythema, edema, exudate or lesions  Pulmonary  Auscultation of lungs: Clear to auscultation  Cardiovascular  Auscultation of heart: Normal rate and rhythm, normal S1 and S2, without murmurs  Examination of extremities for edema and/or varicosities: Normal    Carotid pulses: Normal    Abdomen  Abdomen: Abnormal   Skin findings: a drain  The surrounding skin was normal   Liver and spleen: No hepatomegaly or splenomegaly  Lymphatic  Palpation of lymph nodes in neck: No lymphadenopathy  Musculoskeletal  Gait and station: Normal    Digits and nails: Normal without clubbing or cyanosis  Inspection/palpation of joints, bones, and muscles: Normal    Skin  Skin and subcutaneous tissue: Normal without rashes or lesions  Neurologic  Cranial nerves: Cranial nerves 2-12 intact  Sensation: No sensory loss  Psychiatric  Orientation to person, place, and time: Normal    Mood and affect: Normal    Additional Exam:  Eccymosis where she had venipuncture  ECOG 1       Results/Data  (1) CBC/PLT/DIFF 13Oct2017 07:12AM Raquel Mayorga   TW Order Number: [id number]     Test Name Result Flag Reference   WBC COUNT 5 90 Thousand/uL  4 31-10 16   RBC COUNT 3 89 Million/uL  3 81-5 12   HEMOGLOBIN 11 4 g/dL L 11 5-15 4   HEMATOCRIT 34 9 %  34 8-46  1   MCV 90 fL  82-98   MCH 29 3 pg  26 8-34 3   MCHC 32 7 g/dL  31 4-37 4   RDW 14 5 %  11 6-15 1   MPV 10 0 fL  8 9-12 7   PLATELET COUNT 985 Thousands/uL  149-390   nRBC AUTOMATED 0 /100 WBCs     NEUTROPHILS RELATIVE PERCENT 71 %  43-75   LYMPHOCYTES RELATIVE PERCENT 23 %  14-44   MONOCYTES RELATIVE PERCENT 3 % L 4-12   EOSINOPHILS RELATIVE PERCENT 2 %  0-6   BASOPHILS RELATIVE PERCENT 1 %  0-1   NEUTROPHILS ABSOLUTE COUNT 4 14 Thousands/? ??L  1 85-7 62   LYMPHOCYTES ABSOLUTE COUNT 1 37 Thousands/? ??L  0 60-4 47   MONOCYTES ABSOLUTE COUNT 0 17 Thousand/? ??L  0 17-1 22   EOSINOPHILS ABSOLUTE COUNT 0 10 Thousand/? ??L  0 00-0 61   BASOPHILS ABSOLUTE COUNT 0 08 Thousands/? ??L  0 00-0 10     (1) COMPREHENSIVE METABOLIC PANEL 87JAG1118 44:77MA Shamika Livingston    Order Number: [id number]     Test Name Result Flag Reference   SODIUM 140 mmol/L  136-145   POTASSIUM 4 3 mmol/L  3 5-5 3   CHLORIDE 109 mmol/L H 100-108   CARBON DIOXIDE 22 mmol/L  21-32   ANION GAP (CALC) 9 mmol/L  4-13   BLOOD UREA NITROGEN 47 mg/dL H 5-25   CREATININE 2 93 mg/dL H 0 60-1 30   Standardized to IDMS reference method   CALCIUM 8 7 mg/dL  8 3-10 1   BILI, TOTAL 0 52 mg/dL  0 20-1 00   ALK PHOSPHATAS 90 U/L     ALT (SGPT) 15 U/L  12-78   Specimen collection should occur prior to Sulfasalazine and/or Sulfapyridine administration due to the potential for falsely depressed results  AST(SGOT) 23 U/L  5-45   Specimen collection should occur prior to Sulfasalazine administration due to the potential for falsely depressed results  ALBUMIN 3 7 g/dL  3 5-5 0   TOTAL PROTEIN 6 9 g/dL  6 4-8 2   eGFR 15 ml/min/1 73sq m       [facility] Disease Education Program recommendations are as follows: GFR calculation is accurate only with a steady state creatinine Chronic Kidney disease less than 60 ml/min/1 73 sq  meters Kidney failure less than 15 ml/min/1 73 sq  meters  GLUCOSE FASTING 92 mg/dL  65-99   Specimen collection should occur prior to Sulfasalazine administration due to the potential for falsely depressed results  Specimen collection should occur prior to Sulfapyridine administration due to the potential for falsely elevated results  Future Appointments    Date/Time Provider Specialty Site   06/20/2018 08:30 AM ROXANN Costello  Urology [facility] Trena Wolf       Signatures   Electronically signed by :  ROXANN Negrete ; Oct 17 2017 10:29AM EST                       (Author)

## 2017-11-16 ENCOUNTER — GENERIC CONVERSION - ENCOUNTER (OUTPATIENT)
Dept: OTHER | Facility: OTHER | Age: 72
End: 2017-11-16

## 2018-01-09 NOTE — MISCELLANEOUS
Message   Recorded as Task   Date: 05/17/2016 07:24 PM, Created By: Rusty Gaines   Task Name: Miscellaneous   Assigned To: Tamiko Joshi   Regarding Patient: Dori Bermudez, Status: Active   CommentVerma Snare - 17 May 2016 7:24 PM     TASK CREATED  Most recent BMP reviewed showing worsening renal function  Please contact patient, noted she saw Dr Sunny Morel recently and plan was to do urodynamics, please ask patient if she had procedure done and if not to contact Urologist to expedite it  Mayra please cc recent blood test results to urology office and ask them about any further intervention  Thanks,    GC       Patient is having Urodynamincs done tomorrow  Lab results faxed to Dr Sim Livingston    1  Acute embolism and thrombosis of unspecified deep veins of unspecified lower   extremity (453 40) (I82 409)   2  Acute renal insufficiency (593 9) (N28 9)   3  CKD (chronic kidney disease), stage IV (585 4) (N18 4)   4  Encounter for routine gynecological examination (V72 31) (Z01 419)   5  Hydronephrosis (591) (N13 30)   6  Hypertension (401 9) (I10)   7  Hypothyroidism (244 9) (E03 9)   8  Incontinence (788 30) (R32)   9  Mass of urethra (599 84) (N36 8)   10  Pap smear, as part of routine gynecological examination (V76 2) (Z01 419)   11  Pelvic mass in female (789 30) (R19 00)   12  Polycythemia vera (238 4) (D45)   13  Postmenopausal bleeding (627 1) (N95 0)   14  Pruritus (698 9) (L29 9)   15  Urgency of urination (788 63) (R39 15)   16  Urinary retention (788 20) (R33 9)   17  Visit for screening mammogram (V76 12) (Z12 31)    Current Meds   1  AmLODIPine Besylate 10 MG Oral Tablet; TAKE 1 TABLET DAILY  Requested for:   08Apr2016; Last Rx:08Apr2016 Ordered   2  Jakafi 10 MG Oral Tablet; 1 DAILY  Requested for: 09BUG1434; Last Rx:37Pkn7445   Ordered   3  Levothyroxine Sodium 50 MCG Oral Tablet; TAKE 1 TABLET DAILY; Therapy: (Recorded:48Tke8012) to Recorded   4   Vitamin D3 1000 UNIT Oral Tablet; TAKE 1 TABLET DAILY; Therapy: (Recorded:26Jem3015) to Recorded   5  Welchol 625 MG Oral Tablet; TAKE 1 TABLET DAILY; Therapy: 99JUK8802 to Recorded   6  Xarelto 10 MG Oral Tablet; take one tablet by mouth daily; Therapy: 82YIX7588 to (Last Rx:16Mar2016)  Requested for: 12OVO1894 Ordered    Allergies    1   No Known Drug Allergies    Signatures   Electronically signed by : ROXANN Alvarado ; May 24 2016  9:16AM EST

## 2018-01-11 NOTE — MISCELLANEOUS
Message   Recorded as Task   Date: 01/17/2017 01:58 PM, Created By: Rey Kay   Task Name: Miscellaneous   Assigned To: Mahin Given   Regarding Patient: Angel Adamson, Status: Active   CommentJulene Ree - 17 Jan 2017 1:58 PM     TASK CREATED  Recent labs reviewed - creatinien slighlty worst (now 3 0 from previous value around 2 8), noted persistent acidosis, elevated PTH and Hgb at goal     Please call patient - advise to stay well hydrated and we have to start Calcitriol 0 25 mcg 1 tab daily as well as sodium bicarbonate 650 mg 1 tab BID and repeat labs in 2 months before her next f/u appt (renal function panel and PTH)  Thanks,    Wiliam Pump - 19 Jan 2017 11:31 AM     TASK REPLIED TO: Previously Assigned To Mayra Pillai  Patient has an appointment with you on Monday  Do you want to discuss results with her then? Eduardo Levy - 23 Jan 2017 9:42 AM     TASK REPLIED TO: Previously Assigned To Eduardo Levy  ok  discussed today during office visit        Active Problems    1  Acidosis, metabolic (034 7) (U71 9)   2  Acute embolism and thrombosis of unspecified deep veins of unspecified lower   extremity (453 40) (I82 409)   3  Acute renal insufficiency (593 9) (N28 9)   4  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) (I26 92)   5  CKD (chronic kidney disease), stage IV (585 4) (N18 4)   6  Encounter for routine gynecological examination (V72 31) (Z01 419)   7  Hydronephrosis (591) (N13 30)   8  Hypertension (401 9) (I10)   9  Hypothyroidism (244 9) (E03 9)   10  Mass of urethra (599 84) (N36 8)   11  Pap smear, as part of routine gynecological examination (V76 2) (Z01 419)   12  Pelvic mass in female (789 30) (R19 00)   13  Polycythemia vera (238 4) (D45)   14  Postmenopausal bleeding (627 1) (N95 0)   15  Pruritus (698 9) (L29 9)   16  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)   17  Urgency of urination (788 63) (R39 15)   18   Urinary retention (788 20) (R33 9)   19  Visit for screening mammogram (V76 12) (Z12 31)    Current Meds   1  AmLODIPine Besylate 5 MG Oral Tablet; TAKE 1 TABLET DAILY; Therapy: (Recorded:69Fzh6318) to  Requested for: 21GFI0532 Recorded   2  Calcitriol 0 25 MCG Oral Capsule; take 1 capsule daily; Therapy: 05RTK2520 to (Evaluate:18Jan2018)  Requested for: 22QGJ6325; Last   Rx:23Jan2017 Ordered   3  Eliquis 2 5 MG Oral Tablet; 1 tab bid; Therapy: 46RUJ9539 to (Last Rx:20Jan2017)  Requested for: 20Jan2017 Ordered   4  Jakafi 10 MG Oral Tablet; 1 DAILY  Requested for: 49FKR7188; Last Rx:83Usa7122   Ordered   5  Levothyroxine Sodium 75 MCG Oral Tablet; TAKE 1 TABLET DAILY AS DIRECTED; Therapy: (Recorded:18Ydy0304) to Recorded   6  Metoprolol Tartrate 25 MG Oral Tablet; TAKE 0 5 TABLET Twice daily; Therapy: 00WSV7671 to Recorded   7  Sodium Bicarbonate 650 MG Oral Tablet; TAKE 1 TABLET TWICE DAILY WITH MEALS; Therapy: 10JRE1408 to (Evaluate:18Jan2018)  Requested for: 29BBO9063; Last   JF:82RRU7296 Ordered   8  Vitamin D3 1000 UNIT Oral Tablet; TAKE 1 TABLET DAILY; Therapy: (Recorded:59Mws9652) to Recorded   9  Xarelto 10 MG Oral Tablet; take one tablet by mouth daily; Therapy: 72EQH6556 to (Last Rx:75Uoq7196)  Requested for: 17Ufz7757 Ordered    Allergies    1   No Known Drug Allergies    Signatures   Electronically signed by : ROXANN Lawson ; Jan 23 2017 11:38AM EST

## 2018-01-11 NOTE — PROGRESS NOTES
Chief Complaint  Pre-Op Straight cath urine collection for UA, C&S  Patient has total urinary incontinence  Active Problems    1  Acute embolism and thrombosis of unspecified deep veins of unspecified lower   extremity (453 40) (I82 409)   2  Acute renal insufficiency (593 9) (N28 9)   3  CKD (chronic kidney disease), stage IV (585 4) (N18 4)   4  Encounter for routine gynecological examination (V72 31) (Z01 419)   5  Hydronephrosis (591) (N13 30)   6  Hypertension (401 9) (I10)   7  Hypothyroidism (244 9) (E03 9)   8  Incontinence (788 30) (R32)   9  Mass of urethra (599 84) (N36 8)   10  Pap smear, as part of routine gynecological examination (V76 2) (Z01 419)   11  Pelvic mass in female (789 30) (R19 00)   12  Polycythemia vera (238 4) (D45)   13  Postmenopausal bleeding (627 1) (N95 0)   14  Pruritus (698 9) (L29 9)   15  Urgency of urination (788 63) (R39 15)   16  Urinary retention (788 20) (R33 9)   17  Visit for screening mammogram (V76 12) (Z12 31)    Current Meds   1  4X Probiotic Oral Tablet Recorded   2  AmLODIPine Besylate 10 MG Oral Tablet; TAKE 1 TABLET DAILY  Requested for:   08Apr2016; Last Rx:08Apr2016 Ordered   3  Jakafi 10 MG Oral Tablet; 1 DAILY  Requested for: 91FCK9222; Last Rx:65Bfi2526   Ordered   4  Levothyroxine Sodium 50 MCG Oral Tablet; TAKE 1 TABLET DAILY; Therapy: (Recorded:17Kae3589) to Recorded   5  Vitamin D3 1000 UNIT Oral Tablet; TAKE 1 TABLET DAILY; Therapy: (Recorded:20Hne1602) to Recorded   6  Welchol 625 MG Oral Tablet; TAKE 1 TABLET DAILY; Therapy: 79NGO2023 to Recorded   7  Xarelto 10 MG Oral Tablet; take one tablet by mouth daily; Therapy: 17NCQ6732 to (Last Rx:16Mar2016)  Requested for: 09WEQ1586 Ordered    Allergies    1  No Known Drug Allergies    Vitals  Signs    Systolic: 273  Diastolic: 88  Heart Rate: 68  Height: 5 ft   Weight: 150 lb   BMI Calculated: 29 30  BSA Calculated: 1 65    Procedure    Procedure: Attempted straight cath x 2   Unsuccessful for urine collection  Bladder US done  0 ml of urine in the bladder  Patient continuously leaking drops of urine  Catheterized patient with 16 Fr coude catheter  Inflated balloon with 10 ml of sterile water  Patient C/O of significant pressure  Balloon deflated and Juarez placement adjusted  10 ml of sterile water placed in balloon  Patient denied pressure  Juarez attached to bedside drainage bag and bag hung above bladder  Patient continued to drink water  Approximately 1/2 hour later, urine sample was collected from port of the tubing of drainage bag  Sample sent to  Lab  Dylan Childs PA-C ordered UA, C&S and urine cytology  Assessment    1  Incontinence (788 30) (R32)   2  Mass of urethra (599 84) (N36 8)    Plan  Hydronephrosis, Incontinence    · (1) URINALYSIS (will reflex a microscopy if leukocytes, occult blood, protein or nitrites  are not within normal limits); Status: In Progress - Specimen/Data  Collected,Retrospective By Protocol Authorization;   Done: 80KRW8515   Perform:[facility]; Last Updated By:Neo Dacosta; 7/25/2016 8:55:22 AM;Ordered;  For:Hydronephrosis, Incontinence; Ordered By:Preehti Matson;   · (1) URINE CULTURE; Source:Urine, Straight Cath; Status: In Progress - Specimen/Data  Collected,Retrospective By Protocol Authorization;   Done: 95ZMQ3634   Perform:[facility]; Last Updated By:Neo Dacosta; 7/25/2016 8:55:22 AM;Ordered;  For:Hydronephrosis, Incontinence; Ordered By:Preethi Matson; Mass of urethra    · (1) URINE CYTOLOGY; Status: In Progress - Specimen/Data Collected,Retrospective By  Protocol Authorization;   Done: 55RWV5507   Perform:[facility]; CUO:59OOU6115;CLPXANC; For:Mass of urethra; Ordered By:Preethi aMtson;  : Catheterized   · Follow-up as previously scheduled Evaluation and Treatment  Follow-up  Status:  Complete - Retrospective By Protocol Authorization  Done: 93DMF6601 10:34AM   Ordered;  For: Mass of urethra; Ordered By: Julio Palomo Performed:  Due: 88SNE1449    Future Appointments    Date/Time Provider Specialty Site   09/09/2016 09:45 AM ROXANN Griggs  Hematology Oncology [facility] MED[facility] ONCOLOGY   07/27/2016 01:30 PM ROXANN Martinez   Urology [address] [facility] [place]     Signatures   Electronically signed by : Aquiles Albert, ; Jul 25 2016 10:34AM EST                       (Author)    Electronically signed by : ROXNAN Dean ; Jul 26 2016  8:59AM EST

## 2018-01-11 NOTE — MISCELLANEOUS
Message   Recorded as Task   Date: 03/27/2017 09:33 AM, Created By: Lex Menon   Task Name: Miscellaneous   Assigned To: Lex Menon   Regarding Patient: Wilber Larson, Status: Active   CommentSabas Rola - 27 Mar 2017 9:33 AM     TASK CREATED  Caller: Self; Other; [phone] (Home); [phone] x,,,,, (Work)  Patient called asking about lab results  Eduardo Levy - 29 Mar 2017 4:32 PM     TASK REPLIED TO: Previously Assigned To [employer] with [place] about recent blood test results, her kidney function still declining, her appetite still OK, no nausea or vomiting  Still with lose stools and is following with GI  I will see her in 3 weeks in the office  Mayra please make sure Kidney smart class contact her for classes  Thanks,    GC       I contacted bhumi regarding Kidney Smart class, she is checking into it  AG      Active Problems    1  Acidosis, metabolic (663 8) (C71 4)   2  Acute embolism and thrombosis of unspecified deep veins of unspecified lower   extremity (453 40) (I82 409)   3  Acute renal insufficiency (593 9) (N28 9)   4  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) (I26 92)   5  CKD (chronic kidney disease), stage IV (585 4) (N18 4)   6  Encounter for routine gynecological examination (V72 31) (Z01 419)   7  Hydronephrosis (591) (N13 30)   8  Hypertension (401 9) (I10)   9  Hypothyroidism (244 9) (E03 9)   10  Mass of urethra (599 84) (N36 8)   11  Pap smear, as part of routine gynecological examination (V76 2) (Z01 419)   12  Pelvic mass in female (789 30) (R19 00)   13  Polycythemia vera (238 4) (D45)   14  Postmenopausal bleeding (627 1) (N95 0)   15  Pruritus (698 9) (L29 9)   16  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)   17  Urgency of urination (788 63) (R39 15)   18  Urinary retention (788 20) (R33 9)   19  Visit for screening mammogram (V76 12) (Z12 31)    Current Meds   1   AmLODIPine Besylate 5 MG Oral Tablet; TAKE 1 TABLET DAILY; Therapy: (Recorded:54Qca0771) to  Requested for: 99JBA2552 Recorded   2  Calcitriol 0 25 MCG Oral Capsule; take 1 capsule daily; Therapy: 36EMS9826 to (Evaluate:18Jan2018)  Requested for: 37COY6608; Last   Rx:23Jan2017 Ordered   3  Eliquis 2 5 MG Oral Tablet; 1 tab bid; Therapy: 86ZGR0163 to (Last Rx:20Jan2017)  Requested for: 20Jan2017 Ordered   4  Jakafi 10 MG Oral Tablet; TAKE 1 TABLET BY MOUTH ONE TIME DAILY; Therapy: 60ZNB5629 to (Nicolette Morales)  Requested for: 90GJF2482; Last   Rx:09Mar2017 Ordered   5  Levothyroxine Sodium 75 MCG Oral Tablet; TAKE 1 TABLET DAILY AS DIRECTED; Therapy: (Recorded:40Rui3804) to Recorded   6  Metoprolol Tartrate 25 MG Oral Tablet; TAKE 0 5 TABLET Twice daily; Therapy: 51DTF9299 to Recorded   7  Sodium Bicarbonate 650 MG Oral Tablet; TAKE 1 TABLET TWICE DAILY WITH MEALS; Therapy: 08JZG7032 to (Evaluate:18Jan2018)  Requested for: 24FAR3629; Last   DC:15CNX2500 Ordered   8  Vitamin D3 1000 UNIT Oral Tablet; TAKE 1 TABLET DAILY; Therapy: (Recorded:15Asc4540) to Recorded    Allergies    1   No Known Drug Allergies    Signatures   Electronically signed by : ROXANN Kan ; Mar 29 2017  4:53PM EST

## 2018-01-11 NOTE — MISCELLANEOUS
Message   Recorded as Task   Date: 06/01/2017 02:12 PM, Created By: Guy Carrasco   Task Name: Miscellaneous   Assigned To: Shyam Davis   Regarding Patient: Marina Yap, Status: Active   Es Dominguez - 01 Jun 2017 2:12 PM     TASK CREATED  Repeat labs reviwed - creatinine stable at 3 0 for last 6 months  PTH at goal  Hgb at goal   Please call patient and make sure she went to kidney smart class and please arrange a follow up in one month  Thanks,    GC     I spoke to the patient and she is aware her kidney function is stable  We made her an appointment for 7/7  AG      Active Problems    1  Acidosis, metabolic (890 7) (R72 8)   2  Acute embolism and thrombosis of unspecified deep veins of unspecified lower   extremity (453 40) (I82 409)   3  Acute renal insufficiency (593 9) (N28 9)   4  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) (I26 92)   5  CKD (chronic kidney disease), stage IV (585 4) (N18 4)   6  Encounter for routine gynecological examination (V72 31) (Z01 419)   7  Hydronephrosis (591) (N13 30)   8  Hypertension (401 9) (I10)   9  Hypothyroidism (244 9) (E03 9)   10  Mass of urethra (599 84) (N36 8)   11  Pap smear, as part of routine gynecological examination (V76 2) (Z01 419)   12  Pelvic mass in female (789 30) (R19 00)   13  Polycythemia vera (238 4) (D45)   14  Postmenopausal bleeding (627 1) (N95 0)   15  Pruritus (698 9) (L29 9)   16  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)   17  Urgency of urination (788 63) (R39 15)   18  Urinary retention (788 20) (R33 9)   19  Visit for screening mammogram (V76 12) (Z12 31)    Current Meds   1  AmLODIPine Besylate 5 MG Oral Tablet; TAKE 1 TABLET DAILY; Therapy: (Recorded:95Iyn4826) to  Requested for: 48FZW4322 Recorded   2  Calcitriol 0 25 MCG Oral Capsule; take 1 capsule daily; Therapy: 47AHT3613 to (Evaluate:87Vrc4774)  Requested for: 49CAL4737; Last   Rx:23Jan2017 Ordered   3  Eliquis 2 5 MG Oral Tablet; take 0 5 tablet daily; Therapy: 19EMZ9012 to  Requested for: 21Apr2017 Recorded   4  Jakafi 10 MG Oral Tablet; TAKE 1 TABLET BY MOUTH ONE TIME DAILY; Therapy: 05KCL7871 to (Pinky Louise)  Requested for: 63ETR8324; Last   Rx:09Mar2017 Ordered   5  Levothyroxine Sodium 75 MCG Oral Tablet; TAKE 1 TABLET DAILY AS DIRECTED; Therapy: (Recorded:51Rwv1188) to Recorded   6  Metoprolol Tartrate 25 MG Oral Tablet; TAKE 0 5 TABLET Twice daily; Therapy: 00CBK7471 to Recorded   7  Sodium Bicarbonate 650 MG Oral Tablet; TAKE 1 CAPSULE Daily; Therapy: 83VQV8695 to (Evaluate:16Apr2018)  Requested for: 21Apr2017 Recorded   8  Vancomycin HCl - 250 MG Oral Capsule; 1 twice daily Recorded   9  Vitamin D3 1000 UNIT Oral Tablet; TAKE 1 TABLET DAILY; Therapy: (Recorded:91Gzl6876) to Recorded    Allergies    1   No Known Drug Allergies    Signatures   Electronically signed by : ROXANN Lombardi ; Jun 2 2017  2:27PM EST

## 2018-01-11 NOTE — PROCEDURES
Procedures by Miguelangel Blanco RN at 10/12/2016  9:00 AM      Author:  Miguelangel Blanco RN Service:  (none) Author Type:  Registered Nurse     Filed:  10/12/2016  9:02 AM Date of Service:  10/12/2016  9:00 AM Status:  Signed     :  Miguelangel Blanco RN (Registered Nurse)         Procedure Orders:       1  Insert PICC line [id number] ordered by Lola William MD at 10/12/16 0710                    Insert PICC line  Date/Time: 10/12/2016 9:01 AM  Performed by: Dorothy Arana by: Cecily Noble     Patient location:  Bedside  Other Assisting Provider:  Yes (comment) ([facility] pca)    Consent:     Consent obtained:  Written    Consent given by:  Patient    Procedural risks discussed: by md     Alternatives discussed: by md   Universal protocol:     Procedure explained and questions answered to patient or proxy's satisfaction: yes      Relevant documents present and verified: yes      Test results available and properly labeled: yes       Imaging studies available: yes      Required blood products, implants, devices, and special equipment available: yes      Site/side marked: yes      Immediately prior to procedure, a time out was called: yes      Patient identity confirmed:  Verbally with patient and arm band  Pre-procedure details:     Hand hygiene: Hand hygiene performed prior to insertion      Sterile barrier technique: All elements of maximal sterile technique followed      Skin preparation:  2% chlorhexidine    Skin preparation agent: Skin preparation agent completely dried prior to procedure    Indications:     PICC line indications: total parenteral nutrition      PICC line indications comment:  Sticker placed to lumen  Anesthesia (see MAR for exact dosages):      Anesthesia method:  Local infiltration    Local anesthetic:  Lidocaine 1% w/o epi  Procedure details:     Location:  Basilic    Vessel type: vein      Laterality:  Left    Approach: percutaneous technique used Patient position:  Flat    Procedural supplies:  Double lumen    Catheter size:  5 Fr    Landmarks identified: yes      Ultrasound guidance: yes      Sterile ultrasound techniques: Sterile gel and sterile probe covers were used      Number of attempts:  1    Successful placement: yes      Vessel of catheter tip end:  Brock 3cg, ok to use    Total catheter length (cm):  48    Catheter out on skin (cm):  2    Max flow rate:  999    Arm circumference:  33  Post-procedure details:     Post-procedure:  Dressing applied and securement device placed    Assessment:  Blood return through all ports    Post-procedure complications: none      Patient tolerance of procedure:   Tolerated well, no immediate complications                     Received for:Provider  EPIC   Oct 12 2016  9:03AM [facility] Standard Time

## 2018-01-12 VITALS
HEART RATE: 68 BPM | BODY MASS INDEX: 33.59 KG/M2 | TEMPERATURE: 97 F | DIASTOLIC BLOOD PRESSURE: 80 MMHG | WEIGHT: 172 LBS | SYSTOLIC BLOOD PRESSURE: 130 MMHG | RESPIRATION RATE: 16 BRPM

## 2018-01-12 VITALS
HEART RATE: 68 BPM | BODY MASS INDEX: 28.86 KG/M2 | DIASTOLIC BLOOD PRESSURE: 84 MMHG | WEIGHT: 147 LBS | RESPIRATION RATE: 18 BRPM | HEIGHT: 60 IN | TEMPERATURE: 96.1 F | SYSTOLIC BLOOD PRESSURE: 132 MMHG | OXYGEN SATURATION: 99 %

## 2018-01-12 NOTE — MISCELLANEOUS
Message   Recorded as Task   Date: 08/16/2017 10:12 AM, Created By: Hiri   Task Name: Miscellaneous   Assigned To: Tamiko Joshi   Regarding Patient: Dori Bermudez, Status: In Progress   Comment:    Parris Cevallos - 16 Aug 2017 10:12 AM     TASK CREATED  blood pressure readings  7/13/17 bp 130/80  7/18/17 bp 131/88  8/3/17 bp 121/89  8/16/17 bp 134/89   Eduardo Levy - 17 Aug 2017 1:56 PM     TASK REPLIED TO: Previously Assigned To Mayra Pillai  Please call patient and ask for her heart rate  her SBP is OK but the lower number higher than ideal range (it should be less than 80)  Based on her heart rate, we can decide what BP meds should be increased  Lynda Faust - 18 Aug 2017 11:30 AM     TASK IN PROGRESS   Mayra Pillai - 18 Aug 2017 11:31 AM     TASK EDITED    L/M with patient's  for her to call me   Tamiko Johsi - 18 Aug 2017 1:11 PM     TASK REPLIED TO: Previously Assigned To Mayra Pillai    Patient said she did not record pulse   Eduardo Levy - 19 Aug 2017 7:59 AM     TASK REPLIED TO: Previously Assigned To Eduardo Levy  Please tell her to check her BP with pulse for a week and then send us her readings  Thanks,         Patient is aware she needs to check BP for a week with pulse and will call me on Friday with readings  AG      Active Problems    1  Acidosis, metabolic (286 3) (U80 8)   2  Acute embolism and thrombosis of unspecified deep veins of unspecified lower   extremity (453 40) (I82 409)   3  Acute renal insufficiency (593 9) (N28 9)   4  Anemia in CKD (chronic kidney disease) (285 21) (N18 9,D63 1)   5  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) (I26 92)   6  CKD (chronic kidney disease), stage IV (585 4) (N18 4)   7  Encounter for routine gynecological examination (V72 31) (Z01 419)   8  Hydronephrosis (591) (N13 30)   9  Hypertension (401 9) (I10)   10  Hypothyroidism (244 9) (E03 9)   11   Mass of urethra (599 84) (N36 8)   12  Pap smear, as part of routine gynecological examination (V76 2) (Z01 419)   13  Pelvic mass in female (789 30) (R19 00)   14  Polycythemia vera (238 4) (D45)   15  Postmenopausal bleeding (627 1) (N95 0)   16  Pruritus (698 9) (L29 9)   17  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)   18  Urgency of urination (788 63) (R39 15)   19  Urinary retention (788 20) (R33 9)   20  Visit for screening mammogram (V76 12) (Z12 31)    Current Meds   1  AmLODIPine Besylate 5 MG Oral Tablet; TAKE 1 TABLET DAILY; Therapy: (Recorded:42Yde7160) to  Requested for: 06WVG1628 Recorded   2  Calcitriol 0 25 MCG Oral Capsule; take 1 capsule daily; Therapy: 20QAV2605 to (Evaluate:18Jan2018)  Requested for: 03HBR7216; Last   Rx:23Jan2017 Ordered   3  Eliquis 2 5 MG Oral Tablet; Take 1 tablet daily; Therapy: 19OVK8295 to  Requested for: 00BAS1188 Recorded   4  Florastor 250 MG Oral Capsule; TAKE 1 CAPSULE TWICE DAILY; Therapy: (Recorded:98Qwi4905) to Recorded   5  Jakafi 10 MG Oral Tablet; TAKE 1 TABLET BY MOUTH ONE TIME DAILY; Therapy: 96FAR1464 to (Portland Sake)  Requested for: 91LCR7622; Last   Rx:09Mar2017 Ordered   6  Levothyroxine Sodium 75 MCG Oral Tablet; TAKE 1 TABLET DAILY AS DIRECTED; Therapy: (Recorded:84Tlz3672) to Recorded   7  Metoprolol Tartrate 25 MG Oral Tablet; TAKE 0 5 TABLET Twice daily; Therapy: 88LJC9416 to Recorded   8  Preorbotic Oral Capsule; Therapy: (Recorded:50Eig9141) to Recorded   9  Sodium Bicarbonate 650 MG Oral Tablet; TAKE 1 TABLET TWICE DAILY WITH MEALS; Therapy: 71EWF0680 to (Evaluate:18Oct2017)  Requested for: 67CIJ8209 Recorded   10  Vitamin D3 1000 UNIT Oral Tablet; TAKE 1 TABLET DAILY; Therapy: (Recorded:03Amq9732) to Recorded    Allergies    1   No Known Drug Allergies    Signatures   Electronically signed by : ROXANN Shaikh ; Aug 22 2017 12:15PM EST

## 2018-01-12 NOTE — RESULT NOTES
Verified Results  (1) BASIC METABOLIC PROFILE 23PYV7848 07:06AM Natalie NUNEZ Order Number: [id number]      National Kidney Disease Education Program recommendations are as follows:  GFR calculation is accurate only with a steady state creatinine  Chronic Kidney disease less than 60 ml/min/1 73 sq  meters  Kidney failure less than 15 ml/min/1 73 sq  meters  Test Name Result Flag Reference   GLUCOSE,RANDM 87 mg/dL     If the patient is fasting, the ADA then defines impaired fasting glucose as > 100 mg/dL and diabetes as > or equal to 123 mg/dL  SODIUM 143 mmol/L  136-145   POTASSIUM 3 9 mmol/L  3 5-5 3   CHLORIDE 108 mmol/L  100-108   CARBON DIOXIDE 27 mmol/L  21-32   ANION GAP (CALC) 8 mmol/L  4-13   BLOOD UREA NITROGEN 29 mg/dL H 5-25   CREATININE 1 96 mg/dL H 0 60-1 30   CALCIUM 8 2 mg/dL L 8 3-10 1   eGFR Non-African American 25 3 ml/min/1 73sq m       (1) URINE PROTEIN CREATININE RATIO 01Apr2016 07:06AM Mani Ortiz Order Number: [id number]     Test Name Result Flag Reference   CREATININE URINE 58 7 mg/dL     URINE PROTEIN:CREATININE RATIO 0 97     URINE PROTEIN 57 mg/dL       (1) OTTONIEL SCREEN W/REFLEX TO TITER/PATTERN 01Apr2016 07:06AM Mani Ortiz Order Number: [id number]     Test Name Result Flag Reference   OTTONIEL SCREEN   Negative  Negative

## 2018-01-13 VITALS
BODY MASS INDEX: 30.43 KG/M2 | HEART RATE: 66 BPM | SYSTOLIC BLOOD PRESSURE: 110 MMHG | OXYGEN SATURATION: 95 % | HEIGHT: 60 IN | TEMPERATURE: 97.3 F | RESPIRATION RATE: 16 BRPM | DIASTOLIC BLOOD PRESSURE: 70 MMHG | WEIGHT: 155 LBS

## 2018-01-13 VITALS
SYSTOLIC BLOOD PRESSURE: 142 MMHG | DIASTOLIC BLOOD PRESSURE: 81 MMHG | BODY MASS INDEX: 29.08 KG/M2 | HEIGHT: 60 IN | HEART RATE: 70 BPM | WEIGHT: 148.13 LBS

## 2018-01-13 VITALS
HEART RATE: 68 BPM | DIASTOLIC BLOOD PRESSURE: 82 MMHG | BODY MASS INDEX: 31.12 KG/M2 | HEIGHT: 60 IN | WEIGHT: 158.5 LBS | SYSTOLIC BLOOD PRESSURE: 142 MMHG

## 2018-01-13 NOTE — MISCELLANEOUS
Message   Recorded as Task   Date: 08/25/2017 09:44 AM, Created By: Liam Sanz   Task Name: Miscellaneous   Assigned To: Liam Sanz   Regarding Patient: Luis Brandon, Status: Active   Ho Schmitt - 25 Aug 2017 9:44 AM     TASK CREATED  Caller: Self; Other; [phone] (Home); [phone] x,,,,, (Work)  Patient called with BP readings:    8/21 134/89 63  8/22 130/88 62  8/24 138/78 59  8/25 133/86 69    She said she gets a bit stressed when she has to take her BP, she gets worried about what it is going to say  Eduardo Levy - 31 Aug 2017 3:18 PM     TASK REPLIED TO: Previously Assigned To Eduardo Levy  Please tell her to increase amlodipine to 10 mg daily and to please check her BP 3 times a week for next 2 weeks after increasing dose  Thanks,    GC       Patient is aware of increase in Amlodipine  I sent a new script to the pharmacy and updated the medication list  She will call in 2 weeks with new BP readings  AG      Active Problems    1  Acidosis, metabolic (598 4) (R04 3)   2  Acute embolism and thrombosis of unspecified deep veins of unspecified lower   extremity (453 40) (I82 409)   3  Acute renal insufficiency (593 9) (N28 9)   4  Anemia in CKD (chronic kidney disease) (285 21) (N18 9,D63 1)   5  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) (I26 92)   6  CKD (chronic kidney disease), stage IV (585 4) (N18 4)   7  Encounter for routine gynecological examination (V72 31) (Z01 419)   8  Hydronephrosis (591) (N13 30)   9  Hypertension (401 9) (I10)   10  Hypothyroidism (244 9) (E03 9)   11  Mass of urethra (599 84) (N36 8)   12  Pap smear, as part of routine gynecological examination (V76 2) (Z01 419)   13  Pelvic mass in female (789 30) (R19 00)   14  Polycythemia vera (238 4) (D45)   15  Postmenopausal bleeding (627 1) (N95 0)   16  Pruritus (698 9) (L29 9)   17  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)   18   Urgency of urination (788 63) (R39 15)   19  Urinary retention (788 20) (R33 9)   20  Visit for screening mammogram (V76 12) (Z12 31)    Current Meds   1  AmLODIPine Besylate 5 MG Oral Tablet; TAKE 1 TABLET DAILY; Therapy: (Recorded:83Twm9579) to  Requested for: 20OEL7073 Recorded   2  Calcitriol 0 25 MCG Oral Capsule; take 1 capsule daily; Therapy: 01XTE2429 to (Evaluate:18Jan2018)  Requested for: 01YJH5886; Last   Rx:23Jan2017 Ordered   3  Eliquis 2 5 MG Oral Tablet; Take 1 tablet daily; Therapy: 85XUM8033 to  Requested for: 33TFN9023 Recorded   4  Florastor 250 MG Oral Capsule; TAKE 1 CAPSULE TWICE DAILY; Therapy: (Recorded:37Ogp6300) to Recorded   5  Jakafi 10 MG Oral Tablet; TAKE 1 TABLET BY MOUTH ONE TIME DAILY; Therapy: 16ZCO3937 to ([id number] [id number])  Requested for: 89Qga2150; Last   Rx:09Hxt1015 Ordered   6  Levothyroxine Sodium 75 MCG Oral Tablet; TAKE 1 TABLET DAILY AS DIRECTED; Therapy: (Recorded:17Fax0387) to Recorded   7  Metoprolol Tartrate 25 MG Oral Tablet; TAKE 0 5 TABLET Twice daily; Therapy: 62BTY3786 to Recorded   8  Preorbotic Oral Capsule; Therapy: (Recorded:41Bmq2377) to Recorded   9  Sodium Bicarbonate 650 MG Oral Tablet; TAKE 1 TABLET TWICE DAILY WITH MEALS; Therapy: 23XGI7551 to (Evaluate:18Oct2017)  Requested for: 54YRW6163 Recorded   10  Vitamin D3 1000 UNIT Oral Tablet; TAKE 1 TABLET DAILY; Therapy: (Recorded:57Hlp1493) to Recorded    Allergies    1   No Known Drug Allergies    Plan  Hypertension    · AmLODIPine Besylate 10 MG Oral Tablet; TAKE 1 TABLET DAILY    Signatures   Electronically signed by : ROXANN Hoffmann ; Aug 31 2017  3:58PM EST

## 2018-01-13 NOTE — MISCELLANEOUS
Message   Recorded as Task   Date: 09/20/2016 08:37 AM, Created By: Christen Doing   Task Name: Follow Up   Assigned To: Apurva John   Regarding Patient: Radha Linares, Status: Active   Comment:    Debbi Fitzpatrick - 20 Sep 2016 8:37 AM     TASK CREATED  Caller: Ting, Other; Other  Patient is having surgery and needs a hold on the Xarelto  [facility]  [facility]  [phone]   Apurva John - 20 Sep 2016 9:49 AM     TASK EDITED  left message for Ting to discuss   Apurva John - 20 Sep 2016 9:51 AM     TASK EDITED   per dr Rodgers Neighbor needs IVC filter prior to procedure  once filter is in , ok to stop xarelto 48 hr prior to procedure and resume 48 hours after procedure  left message for Ting to call back to discuss        Active Problems    1  Acute embolism and thrombosis of unspecified deep veins of unspecified lower   extremity (453 40) (I82 409)   2  Acute renal insufficiency (593 9) (N28 9)   3  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) (I26 92)   4  CKD (chronic kidney disease), stage IV (585 4) (N18 4)   5  Encounter for routine gynecological examination (V72 31) (Z01 419)   6  Hydronephrosis (591) (N13 30)   7  Hypertension (401 9) (I10)   8  Hypothyroidism (244 9) (E03 9)   9  Incontinence (788 30) (R32)   10  Mass of urethra (599 84) (N36 8)   11  Pap smear, as part of routine gynecological examination (V76 2) (Z01 419)   12  Pelvic mass in female (789 30) (R19 00)   13  Polycythemia vera (238 4) (D45)   14  Postmenopausal bleeding (627 1) (N95 0)   15  Pruritus (698 9) (L29 9)   16  Urgency of urination (788 63) (R39 15)   17  Urinary retention (788 20) (R33 9)   18  Visit for screening mammogram (V76 12) (Z12 31)    Current Meds   1  4X Probiotic Oral Tablet Recorded   2  AmLODIPine Besylate 10 MG Oral Tablet; TAKE 1 TABLET DAILY  Requested for:   46Jmy6221; Last Rx:92Pli8104 Ordered   3  Jakafi 10 MG Oral Tablet; 1 DAILY  Requested for: 85MXM4526; Last Rx:73Sos1798   Ordered   4  Levothyroxine Sodium 50 MCG Oral Tablet; TAKE 1 TABLET DAILY; Therapy: (Recorded:94Dne9399) to Recorded   5  Vitamin D3 1000 UNIT Oral Tablet; TAKE 1 TABLET DAILY; Therapy: (Recorded:77Lsm0878) to Recorded   6  Welchol 625 MG Oral Tablet; TAKE 1 TABLET DAILY; Therapy: 78DYI5603 to Recorded   7  Xarelto 10 MG Oral Tablet; take one tablet by mouth daily; Therapy: 54UEQ6299 to (Last Rx:01Mkk2282)  Requested for: 74Nao1040 Ordered    Allergies    1   No Known Drug Allergies    Signatures   Electronically signed by : Erin Car, ; Sep 20 2016  9:51AM EST                       (Author)

## 2018-01-14 VITALS
WEIGHT: 163 LBS | SYSTOLIC BLOOD PRESSURE: 130 MMHG | TEMPERATURE: 99 F | HEART RATE: 66 BPM | RESPIRATION RATE: 16 BRPM | HEIGHT: 60 IN | BODY MASS INDEX: 32 KG/M2 | DIASTOLIC BLOOD PRESSURE: 80 MMHG | OXYGEN SATURATION: 99 %

## 2018-01-14 VITALS
HEIGHT: 60 IN | SYSTOLIC BLOOD PRESSURE: 142 MMHG | BODY MASS INDEX: 31.71 KG/M2 | WEIGHT: 161.5 LBS | HEART RATE: 64 BPM | DIASTOLIC BLOOD PRESSURE: 84 MMHG

## 2018-01-14 VITALS
DIASTOLIC BLOOD PRESSURE: 82 MMHG | HEART RATE: 68 BPM | WEIGHT: 163.25 LBS | SYSTOLIC BLOOD PRESSURE: 141 MMHG | HEIGHT: 60 IN | BODY MASS INDEX: 32.05 KG/M2

## 2018-01-14 NOTE — MISCELLANEOUS
Message   Recorded as Task   Date: 02/29/2016 12:03 PM, Created By: Elva Hatfield   Task Name: Miscellaneous   Assigned To: Kati Lao   Regarding Patient: Ed Whyte, Status: Active   CommentSmohsen Holt - 29 Feb 2016 12:03 PM     TASK CREATED  Renal US report reviewed - reported bilateral mild dilation of caliceal symptoms without significant hydro  Please call patient and ask her about order for repeat blood and urine test I ordered last week (please see my note) and let me know results  Russ Jauregui - 29 Feb 2016 4:41 PM     TASK REPLIED TO: Previously Assigned To Kati Lao  Lab were done on 2/24, they are in the flow sheet and went to [facility] provider, not you specifically  Eduardo Levy - 01 Mar 2016 1:37 PM     TASK REPLIED TO: Previously Assigned To Eduardo Levy  BMP result - creatinine still worsening (now 2 1), other test ordered not done  Mayra please call patient and ask her to get urine and blood test done  Tell her renal US did not show any significant changes compared with previous one  Waiting for Renal doppler, blood and urine test - based on that will decide about further plan  Thanks,    GC     Patient Is aware of above and she said she is going to try and go tomorrow for the bloodwork    AG      Active Problems    1  Acute renal insufficiency (593 9) (N28 9)   2  Deep vein thrombosis (453 40) (I82 409)   3  Encounter for routine gynecological examination (V72 31) (Z01 419)   4  Hydronephrosis (591) (N13 30)   5  Hypertension (401 9) (I10)   6  Hypothyroidism (244 9) (E03 9)   7  Incontinence (788 30) (R32)   8  Mass of urethra (599 84) (N36 8)   9  Pap smear, as part of routine gynecological examination (V76 2) (Z01 419)   10  Pelvic mass in female (789 30) (R19 00)   11  Polycythemia vera (238 4) (D45)   12  Postmenopausal bleeding (627 1) (N95 0)   13  Pruritus (698 9) (L29 9)   14  Urinary retention (788 20) (R33 9)   15   Visit for screening mammogram (V76 12) (Z12 31)    Current Meds   1  AmLODIPine Besylate 5 MG Oral Tablet; TAKE 1 TABLET DAILY; Therapy: (Recorded:54Dny7041) to Recorded   2  Citalopram Hydrobromide 10 MG Oral Tablet; Take 1 tablet daily  Requested for:   37Bve8677 Recorded   3  Jakafi 10 MG Oral Tablet; 1 DAILY  Requested for: 22Sgu2581 Recorded   4  Klor-Con 10 10 MEQ Oral Tablet Extended Release; TAKE 1 TABLET DAILY; Therapy: (Recorded:10Txv1251) to Recorded   5  Levothyroxine Sodium 50 MCG Oral Tablet; TAKE 1 TABLET DAILY; Therapy: (Recorded:91Vuj0107) to Recorded   6  Multi-Vitamin TABS; TAKE 1 TABLET DAILY; Therapy: (Recorded:41Tpt4498) to Recorded   7  Myrbetriq 50 MG Oral Tablet Extended Release 24 Hour; Take 1 tablet daily; Therapy: (Recorded:99Aor4769) to Recorded   8  Tylenol Arthritis Pain TBCR Recorded   9  Vitamin D3 1000 UNIT Oral Tablet; TAKE 1 TABLET DAILY; Therapy: (Recorded:23Sma7903) to Recorded   10  Xarelto 20 MG Oral Tablet; Take 1 tablet daily; Therapy: (Recorded:33Hqz4593) to Recorded    Allergies    1   No Known Drug Allergies    Signatures   Electronically signed by : ROXANN Petersen ; Mar  2 2016  1:00PM EST

## 2018-01-14 NOTE — MISCELLANEOUS
Message   Recorded as Task   Date: 09/20/2016 08:37 AM, Created By: Jacquelynn Litten   Task Name: Follow Up   Assigned To: Apurva John   Regarding Patient: Gabby Raza, Status: Active   Comment:    Debbi Fitzpatrick - 20 Sep 2016 8:37 AM     TASK CREATED  Caller: Ting, Darshana; Other  Patient is having surgery and needs a hold on the Xarelto  Ting w  Urology  [phone]   Apurva John - 20 Sep 2016 9:49 AM     TASK EDITED  left message for Ting to discuss   Apurva John - 20 Sep 2016 9:51 AM     TASK EDITED   per dr Brook Mckeon needs IVC filter prior to procedure  once filter is in , ok to stop xarelto 48 hr prior to procedure and resume 48 hours after procedure  left message for Ting to call back to discuss   Apurva John - 20 Sep 2016 10:24 AM     TASK EDITED  spoke with Ting  patient is having urology procedure on 10/4/16 and will need an IVC filter placed 72 hours prior to urology procedure so xarelto can be stopped 48 hours prior  will have our office arrange for IVC FILTER and get back to Ting        Active Problems    1  Acute embolism and thrombosis of unspecified deep veins of unspecified lower   extremity (453 40) (I82 409)   2  Acute renal insufficiency (593 9) (N28 9)   3  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) (I26 92)   4  CKD (chronic kidney disease), stage IV (585 4) (N18 4)   5  Encounter for routine gynecological examination (V72 31) (Z01 419)   6  Hydronephrosis (591) (N13 30)   7  Hypertension (401 9) (I10)   8  Hypothyroidism (244 9) (E03 9)   9  Incontinence (788 30) (R32)   10  Mass of urethra (599 84) (N36 8)   11  Pap smear, as part of routine gynecological examination (V76 2) (Z01 419)   12  Pelvic mass in female (789 30) (R19 00)   13  Polycythemia vera (238 4) (D45)   14  Postmenopausal bleeding (627 1) (N95 0)   15  Pruritus (698 9) (L29 9)   16  Urgency of urination (788 63) (R39 15)   17  Urinary retention (788 20) (R33 9)   18   Visit for screening mammogram (V76 12) (Z12 31)    Current Meds   1  4X Probiotic Oral Tablet Recorded   2  AmLODIPine Besylate 10 MG Oral Tablet; TAKE 1 TABLET DAILY  Requested for:   08Apr2016; Last Rx:07Dld6302 Ordered   3  Jakafi 10 MG Oral Tablet; 1 DAILY  Requested for: 56SEL1295; Last Rx:44Fsh8303   Ordered   4  Levothyroxine Sodium 50 MCG Oral Tablet; TAKE 1 TABLET DAILY; Therapy: (Recorded:45Qph9104) to Recorded   5  Vitamin D3 1000 UNIT Oral Tablet; TAKE 1 TABLET DAILY; Therapy: (Recorded:67Jzm2101) to Recorded   6  Welchol 625 MG Oral Tablet; TAKE 1 TABLET DAILY; Therapy: 40QCO4772 to Recorded   7  Xarelto 10 MG Oral Tablet; take one tablet by mouth daily; Therapy: 59PJY2474 to (Last Rx:43Ono6446)  Requested for: 20Cvx7824 Ordered    Allergies    1   No Known Drug Allergies    Plan  Acute embolism and thrombosis of unspecified deep veins of unspecified lower  extremity    · *1- [facility] Physician Referral  Consult IR for palcement of  IVC filter prior to 10/1/16  Status: Hold For - Scheduling,Retrospective By Protocol  Authorization  Requested for: 08UNJ2085  Care Summary provided  : Yes    Signatures   Electronically signed by : Minh Yu, ; Sep 20 2016 10:30AM EST                       (Author)

## 2018-01-16 NOTE — MISCELLANEOUS
Message   Recorded as Task   Date: 03/08/2016 11:29 AM, Created By: Gabriela Watts   Task Name: Miscellaneous   Assigned To: Dirk Monzon   Regarding Patient: Alexsandra Flores, Status: Active   CommentAnnella Orris - 08 Mar 2016 11:29 AM     TASK CREATED  Labs reviewed - doppler no evidence of acute renal vein trhombosis (noted reported chronic non occlusive small thrombus), urinalysis incrase WBC and protein, no RBC  Noted US no significant changes, doppler no acute vein thrombosis, likely LUANN second to contrast exposure  Please call patient - lets repeat a BMP, u/a with micro and UPC in 5 days to make sure renal function remains stable  Thanks,    GC       I spoke to the patient and she is aware of above  I mailed out lab slips for repeat labs  AG      Active Problems    1  Acute renal insufficiency (593 9) (N28 9)   2  Deep vein thrombosis (453 40) (I82 409)   3  Encounter for routine gynecological examination (V72 31) (Z01 419)   4  Hydronephrosis (591) (N13 30)   5  Hypertension (401 9) (I10)   6  Hypothyroidism (244 9) (E03 9)   7  Incontinence (788 30) (R32)   8  Mass of urethra (599 84) (N36 8)   9  Pap smear, as part of routine gynecological examination (V76 2) (Z01 419)   10  Pelvic mass in female (789 30) (R19 00)   11  Polycythemia vera (238 4) (D45)   12  Postmenopausal bleeding (627 1) (N95 0)   13  Pruritus (698 9) (L29 9)   14  Urinary retention (788 20) (R33 9)   15  Visit for screening mammogram (V76 12) (Z12 31)    Current Meds   1  AmLODIPine Besylate 5 MG Oral Tablet; TAKE 1 TABLET DAILY; Therapy: (Recorded:54Pgx1674) to Recorded   2  Citalopram Hydrobromide 10 MG Oral Tablet; Take 1 tablet daily  Requested for:   40Snu7075 Recorded   3  Jakafi 10 MG Oral Tablet; 1 DAILY  Requested for: 57Icl6117 Recorded   4  Klor-Con 10 10 MEQ Oral Tablet Extended Release; TAKE 1 TABLET DAILY; Therapy: (Recorded:48Psi4138) to Recorded   5   Levothyroxine Sodium 50 MCG Oral Tablet; TAKE 1 TABLET DAILY; Therapy: (Recorded:52Dni8247) to Recorded   6  Multi-Vitamin TABS; TAKE 1 TABLET DAILY; Therapy: (Recorded:02Sep2015) to Recorded   7  Myrbetriq 50 MG Oral Tablet Extended Release 24 Hour; Take 1 tablet daily; Therapy: (Recorded:24Psl4684) to Recorded   8  Tylenol Arthritis Pain TBCR Recorded   9  Vitamin D3 1000 UNIT Oral Tablet; TAKE 1 TABLET DAILY; Therapy: (Recorded:09Zlz5855) to Recorded   10  Xarelto 20 MG Oral Tablet; Take 1 tablet daily; Therapy: (Recorded:46Xro2225) to Recorded    Allergies    1  No Known Drug Allergies    Plan  Hypertension    · (1) BASIC METABOLIC PROFILE; Status:Active - Retrospective By Protocol  Authorization; Requested for:14Mar2016;    · (1) URINALYSIS (will reflex a microscopy if leukocytes, occult blood, protein or nitrites  are not within normal limits); Status:Active - Retrospective By Protocol Authorization; Requested for:14Mar2016;    · (1) URINE PROTEIN CREATININE RATIO; Status:Active - Retrospective By Protocol  Authorization;  Requested for:14Mar2016;     Signatures   Electronically signed by : ROXANN Chang ; Mar  9 2016  7:48PM EST

## 2018-01-17 NOTE — MISCELLANEOUS
Message   Recorded as Task   Date: 09/20/2016 10:26 AM, Created By: Alexandre Slade   Task Name: Care Coordination   Assigned To: Alexandre Slade   Regarding Patient: Selvin Pacheco, Status: Active   Comment:    Alexandre Slade - 20 Sep 2016 10:26 AM     TASK CREATED  please arrange for IVC filter insertion prior to 10/1/16  patient expecting your call  Thanks   Alexandre Slade - 20 Sep 2016 10:29 AM     TASK EDITED  patient has appt on 9/28 in afternoon and 9/29 in the AM, other than that they are available   Debbi Fitzpatrick - 20 Sep 2016 12:01 PM     TASK EDITED   Isac Beltran - 20 Sep 2016 12:26 PM     TASK REPLIED TO: Previously Assigned To Zion Recinos with [place] and confirmed dates and times  Procedure will be performed in [facility] on Tuesday, September 27th  Phone consults will be Thursday, September 22nd with IR Nurse and time and prep required with be covered during this time with the patient  Active Problems    1  Acute embolism and thrombosis of unspecified deep veins of unspecified lower   extremity (453 40) (I82 409)   2  Acute renal insufficiency (593 9) (N28 9)   3  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) (I26 92)   4  CKD (chronic kidney disease), stage IV (585 4) (N18 4)   5  Encounter for routine gynecological examination (V72 31) (Z01 419)   6  Hydronephrosis (591) (N13 30)   7  Hypertension (401 9) (I10)   8  Hypothyroidism (244 9) (E03 9)   9  Incontinence (788 30) (R32)   10  Mass of urethra (599 84) (N36 8)   11  Pap smear, as part of routine gynecological examination (V76 2) (Z01 419)   12  Pelvic mass in female (789 30) (R19 00)   13  Polycythemia vera (238 4) (D45)   14  Postmenopausal bleeding (627 1) (N95 0)   15  Pruritus (698 9) (L29 9)   16  Urgency of urination (788 63) (R39 15)   17  Urinary retention (788 20) (R33 9)   18  Visit for screening mammogram (V76 12) (Z12 31)    Current Meds   1  4X Probiotic Oral Tablet Recorded   2   AmLODIPine Besylate 10 MG Oral Tablet; TAKE 1 TABLET DAILY  Requested for:   08Apr2016; Last Rx:08Apr2016 Ordered   3  Jakafi 10 MG Oral Tablet; 1 DAILY  Requested for: 14CBF5185; Last Rx:95Ceo5083   Ordered   4  Levothyroxine Sodium 50 MCG Oral Tablet; TAKE 1 TABLET DAILY; Therapy: (Recorded:04Eyu2755) to Recorded   5  Vitamin D3 1000 UNIT Oral Tablet; TAKE 1 TABLET DAILY; Therapy: (Recorded:28Zfi2961) to Recorded   6  Welchol 625 MG Oral Tablet; TAKE 1 TABLET DAILY; Therapy: 33GSW8461 to Recorded   7  Xarelto 10 MG Oral Tablet; take one tablet by mouth daily; Therapy: 36KTF3502 to (Last Rx:94Jje1039)  Requested for: 81Bgi3057 Ordered    Allergies    1   No Known Drug Allergies    Signatures   Electronically signed by : Kanwal Montilla, ; Sep 20 2016 12:32PM EST                       (Author)

## 2018-01-17 NOTE — MISCELLANEOUS
Message   Recorded as Task   Date: 03/17/2016 01:04 PM, Created By: Glo Lowe   Task Name: Miscellaneous   Assigned To: Felipa Donovan   Regarding Patient: Jessica Salguero, Status: In Progress   Comment:    Eduardo Levy - 17 Mar 2016 1:04 PM     TASK CREATED  Labs reviewed - renal function unchanged - Creatinine still same range 2 - 2 1 since Feb  Urinalysis with WBC and RBCs  Please call patient, for completeness will repeat a BMP, OTTONIEL, ANCA, complements (C3 and C4) and UPC before upcoming appt in 2 weeks  Callie    Satyatex Sanchez - 17 Mar 2016 1:56 PM     TASK IN PROGRESS       I spoke to the patient's   He is aware of above and patient will have the labs done before next appointment  Lab slips mailed    AG      Active Problems    1  Acute embolism and thrombosis of unspecified deep veins of unspecified lower   extremity (453 40) (I82 409)   2  Acute renal insufficiency (593 9) (N28 9)   3  Encounter for routine gynecological examination (V72 31) (Z01 419)   4  Hydronephrosis (591) (N13 30)   5  Hypertension (401 9) (I10)   6  Hypothyroidism (244 9) (E03 9)   7  Incontinence (788 30) (R32)   8  Mass of urethra (599 84) (N36 8)   9  Pap smear, as part of routine gynecological examination (V76 2) (Z01 419)   10  Pelvic mass in female (789 30) (R19 00)   11  Polycythemia vera (238 4) (D45)   12  Postmenopausal bleeding (627 1) (N95 0)   13  Pruritus (698 9) (L29 9)   14  Urgency of urination (788 63) (R39 15)   15  Urinary retention (788 20) (R33 9)   16  Visit for screening mammogram (V76 12) (Z12 31)    Current Meds   1  AmLODIPine Besylate 5 MG Oral Tablet; TAKE 1 TABLET DAILY; Therapy: (Recorded:55Cai0845) to Recorded   2  Jakafi 10 MG Oral Tablet; 1 DAILY  Requested for: 96Eah7654 Recorded   3  Klor-Con 10 10 MEQ Oral Tablet Extended Release; TAKE 1 TABLET DAILY; Therapy: (Recorded:62Ntc4620) to Recorded   4  Levothyroxine Sodium 50 MCG Oral Tablet; TAKE 1 TABLET DAILY;    Therapy: (Recorded:75Hby4781) to Recorded   5  Multi-Vitamin TABS; TAKE 1 TABLET DAILY; Therapy: (Recorded:01Woe3134) to Recorded   6  Tylenol Arthritis Pain TBCR Recorded   7  Vitamin D3 1000 UNIT Oral Tablet; TAKE 1 TABLET DAILY; Therapy: (Recorded:79Esb5137) to Recorded   8  Xarelto 10 MG Oral Tablet; take one tablet by mouth daily; Therapy: 35TAI8494 to (Last Rx:16Mar2016)  Requested for: 56FEP3026 Ordered   9  Xarelto 20 MG Oral Tablet; Take 1 tablet daily; Therapy: (Recorded:26Prg7908) to Recorded    Allergies    1  No Known Drug Allergies    Plan  Hydronephrosis    · (1) OTTONIEL SCREEN W/REFLEX TO TITER/PATTERN; Status:Active - Retrospective By  Protocol Authorization; Requested for:17Mar2016;    · (1) BASIC METABOLIC PROFILE; Status:Active - Retrospective By Protocol  Authorization; Requested for:17Mar2016;    · (1) C3 COMPLEMENT; Status:Active - Retrospective By Protocol Authorization; Requested for:17Mar2016;    · (1) C4 COMPLEMENT; Status:Active - Retrospective By Protocol Authorization; Requested for:17Mar2016;    · (1) URINE PROTEIN CREATININE RATIO; Status:Active - Retrospective By Protocol  Authorization; Requested for:17Mar2016;    · (LC) ANCA Panel; Status:Active - Retrospective By Protocol Authorization;  Requested  for:17Mar2016;     Signatures   Electronically signed by : ROXANN Jarvis ; Mar 17 2016  2:54PM EST

## 2018-01-17 NOTE — MISCELLANEOUS
Message   Recorded as Task   Date: 01/17/2017 01:58 PM, Created By: Man Suarez   Task Name: Miscellaneous   Assigned To: Katalina Panchal   Regarding Patient: Tee Gandhi, Status: Active   CommentRad Watters - 17 Jan 2017 1:58 PM     TASK CREATED  Recent labs reviewed - creatinien slighlty worst (now 3 0 from previous value around 2 8), noted persistent acidosis, elevated PTH and Hgb at goal     Please call patient - advise to stay well hydrated and we have to start Calcitriol 0 25 mcg 1 tab daily as well as sodium bicarbonate 650 mg 1 tab BID and repeat labs in 2 months before her next f/u appt (renal function panel and PTH)  Jax Ledesma - 19 Jan 2017 11:31 AM     TASK REPLIED TO: Previously Assigned To Mayra Pillai  Patient has an appointment with you on Monday  Do you want to discuss results with her then? Eduardo Levy - 23 Jan 2017 9:42 AM     TASK REPLIED TO: Previously Assigned To Eduardo Levy  ok  discussed today during office visit        Active Problems    1  Acidosis, metabolic (852 6) (M11 3)   2  Acute embolism and thrombosis of unspecified deep veins of unspecified lower   extremity (453 40) (I82 409)   3  Acute renal insufficiency (593 9) (N28 9)   4  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) (I26 92)   5  CKD (chronic kidney disease), stage IV (585 4) (N18 4)   6  Encounter for routine gynecological examination (V72 31) (Z01 419)   7  Hydronephrosis (591) (N13 30)   8  Hypertension (401 9) (I10)   9  Hypothyroidism (244 9) (E03 9)   10  Mass of urethra (599 84) (N36 8)   11  Pap smear, as part of routine gynecological examination (V76 2) (Z01 419)   12  Pelvic mass in female (789 30) (R19 00)   13  Polycythemia vera (238 4) (D45)   14  Postmenopausal bleeding (627 1) (N95 0)   15  Pruritus (698 9) (L29 9)   16  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)   17  Urgency of urination (788 63) (R39 15)   18   Urinary retention (788 20) (R33 9)   19  Visit for screening mammogram (V76 12) (Z12 31)    Current Meds   1  AmLODIPine Besylate 5 MG Oral Tablet; TAKE 1 TABLET DAILY; Therapy: (Recorded:16Hty4889) to  Requested for: 16ORC4580 Recorded   2  Calcitriol 0 25 MCG Oral Capsule; take 1 capsule daily; Therapy: 16BGS4646 to (Evaluate:18Jan2018)  Requested for: 07MUO6866; Last   Rx:23Jan2017 Ordered   3  Eliquis 2 5 MG Oral Tablet; 1 tab bid; Therapy: 69JUR0172 to (Last Rx:20Jan2017)  Requested for: 20Jan2017 Ordered   4  Jakafi 10 MG Oral Tablet; 1 DAILY  Requested for: 49AEP4151; Last Rx:76Ksg4132   Ordered   5  Levothyroxine Sodium 75 MCG Oral Tablet; TAKE 1 TABLET DAILY AS DIRECTED; Therapy: (Recorded:17Ixa6693) to Recorded   6  Metoprolol Tartrate 25 MG Oral Tablet; TAKE 0 5 TABLET Twice daily; Therapy: 62PCA0563 to Recorded   7  Sodium Bicarbonate 650 MG Oral Tablet; TAKE 1 TABLET TWICE DAILY WITH MEALS; Therapy: 75MQU2057 to (Evaluate:18Jan2018)  Requested for: 70QIB1232; Last   OX:00GRR4710 Ordered   8  Vitamin D3 1000 UNIT Oral Tablet; TAKE 1 TABLET DAILY; Therapy: (Recorded:11Fnz0983) to Recorded   9  Xarelto 10 MG Oral Tablet; take one tablet by mouth daily; Therapy: 97UPU9706 to (Last Rx:15Uwg3265)  Requested for: 57Dya0723 Ordered    Allergies    1   No Known Drug Allergies    Signatures   Electronically signed by : ROXANN Petersen ; Jan 23 2017 11:38AM EST

## 2018-01-18 NOTE — MISCELLANEOUS
Message   Recorded as Task   Date: 11/16/2017 08:23 AM, Created By: Gwen Lovell   Task Name: Miscellaneous   Assigned To: Concepcion Santos   Regarding Patient: Idania Rodriguez, Status: Active   Jesus Manuel Stark - 16 Nov 2017 8:23 AM     TASK CREATED  I spoke with Néstor Hua, she is doing a her appetite has been stable, denies any significant complaints or and feeling chronically tire, labs month ago stable  Please arrange for follow-up in 6 months from last office visit with repeat labs (CBC, renal function panel, PTH)  Thanks,    GC     I mailed the patient lab slips to be done in 6 months    AG      Active Problems    1  Acidosis, metabolic (829 2) (F11 2)   2  Acute embolism and thrombosis of unspecified deep veins of unspecified lower   extremity (453 40) (I82 409)   3  Acute renal insufficiency (593 9) (N28 9)   4  Anemia in CKD (chronic kidney disease) (285 21) (N18 9,D63 1)   5  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) (I26 92)   6  CKD (chronic kidney disease), stage IV (585 4) (N18 4)   7  Encounter for routine gynecological examination (V72 31) (Z01 419)   8  Hydronephrosis (591) (N13 30)   9  Hypertension (401 9) (I10)   10  Hypothyroidism (244 9) (E03 9)   11  Mass of urethra (599 84) (N36 8)   12  Pap smear, as part of routine gynecological examination (V76 2) (Z01 419)   13  Pelvic mass in female (789 30) (R19 00)   14  Polycythemia vera (238 4) (D45)   15  Postmenopausal bleeding (627 1) (N95 0)   16  Pruritus (698 9) (L29 9)   17  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)   18  Urgency of urination (788 63) (R39 15)   19  Urinary retention (788 20) (R33 9)   20  Visit for screening mammogram (V76 12) (Z12 31)    Current Meds   1  AmLODIPine Besylate 10 MG Oral Tablet; TAKE 1 TABLET DAILY  Requested for:   04Joy1318; Last Rx:63Lbg0354 Ordered   2  Calcitriol 0 25 MCG Oral Capsule; take 1 capsule daily;    Therapy: 67VDJ4525 to (Evaluate:18Jan2018)  Requested for: 89XRY6228; Last   XC:36SES4846 Ordered   3  Eliquis 2 5 MG Oral Tablet; Take 1 tablet daily; Therapy: 92DGW2937 to  Requested for: 46OHA3189 Recorded   4  Florastor 250 MG Oral Capsule; TAKE 1 CAPSULE TWICE DAILY; Therapy: (Recorded:82Qjc8621) to Recorded   5  Jakafi 10 MG Oral Tablet; TAKE 1 TABLET BY MOUTH ONE TIME DAILY; Therapy: 34IFH5721 to ([place])  Requested for: 55Nda3888; Last   Rx:25Rbx6277 Ordered   6  Levothyroxine Sodium 75 MCG Oral Tablet; TAKE 1 TABLET DAILY AS DIRECTED; Therapy: (Recorded:74Ilh1510) to Recorded   7  Metoprolol Tartrate 25 MG Oral Tablet; TAKE 0 5 TABLET Twice daily; Therapy: 88CUE8185 to Recorded   8  Preorbotic Oral Capsule; Therapy: (Recorded:26Wps4758) to Recorded   9  Sodium Bicarbonate 650 MG Oral Tablet; TAKE 1 TABLET TWICE DAILY WITH MEALS; Therapy: 55LOH9690 to (Evaluate:18Oct2017)  Requested for: 47VGE5370 Recorded   10  Vitamin D3 1000 UNIT Oral Tablet; TAKE 1 TABLET DAILY; Therapy: (Recorded:64Ard8563) to Recorded    Allergies    1  No Known Drug Allergies    Plan  Anemia in CKD (chronic kidney disease)    · (1) CBC/ PLT (NO DIFF); Status:Active; Requested for:89Rct1871; Anemia in CKD (chronic kidney disease), CKD (chronic kidney disease), stage IV    · (1) RENAL FUNCTION PANEL; Status:Active; Requested for:75Vjw9763;   CKD (chronic kidney disease), stage IV, Hypertension    · (1) PTH N-TERMINAL (INTACT); Status:Active;  Requested for:18Cpf0027;     Signatures   Electronically signed by : ROXANN Horowitz ; Nov 16 2017  3:26PM EST

## 2018-01-23 ENCOUNTER — TRANSCRIBE ORDERS (OUTPATIENT)
Dept: LAB | Facility: HOSPITAL | Age: 73
End: 2018-01-23

## 2018-01-23 ENCOUNTER — LAB (OUTPATIENT)
Dept: LAB | Facility: HOSPITAL | Age: 73
End: 2018-01-23
Attending: INTERNAL MEDICINE
Payer: COMMERCIAL

## 2018-01-23 DIAGNOSIS — I10 ESSENTIAL HYPERTENSION, BENIGN: ICD-10-CM

## 2018-01-23 DIAGNOSIS — I13.10 BENIGN HYPERTENSIVE HEART AND RENAL DISEASE: Primary | ICD-10-CM

## 2018-01-23 DIAGNOSIS — I13.10 BENIGN HYPERTENSIVE HEART AND RENAL DISEASE: ICD-10-CM

## 2018-01-23 DIAGNOSIS — N18.4 CHRONIC KIDNEY DISEASE, STAGE IV (SEVERE) (HCC): ICD-10-CM

## 2018-01-23 LAB
ALBUMIN SERPL BCP-MCNC: 3.4 G/DL (ref 3.5–5)
ANION GAP SERPL CALCULATED.3IONS-SCNC: 9 MMOL/L (ref 4–13)
BUN SERPL-MCNC: 45 MG/DL (ref 5–25)
CALCIUM SERPL-MCNC: 8.8 MG/DL (ref 8.3–10.1)
CHLORIDE SERPL-SCNC: 110 MMOL/L (ref 100–108)
CO2 SERPL-SCNC: 23 MMOL/L (ref 21–32)
CREAT SERPL-MCNC: 2.83 MG/DL (ref 0.6–1.3)
ERYTHROCYTE [DISTWIDTH] IN BLOOD BY AUTOMATED COUNT: 15 % (ref 11.6–15.1)
GFR SERPL CREATININE-BSD FRML MDRD: 16 ML/MIN/1.73SQ M
GLUCOSE P FAST SERPL-MCNC: 85 MG/DL (ref 65–99)
HCT VFR BLD AUTO: 34.9 % (ref 34.8–46.1)
HGB BLD-MCNC: 11.4 G/DL (ref 11.5–15.4)
MCH RBC QN AUTO: 29.7 PG (ref 26.8–34.3)
MCHC RBC AUTO-ENTMCNC: 32.7 G/DL (ref 31.4–37.4)
MCV RBC AUTO: 91 FL (ref 82–98)
PHOSPHATE SERPL-MCNC: 4.4 MG/DL (ref 2.3–4.1)
PLATELET # BLD AUTO: 331 THOUSANDS/UL (ref 149–390)
PMV BLD AUTO: 10.1 FL (ref 8.9–12.7)
POTASSIUM SERPL-SCNC: 4.4 MMOL/L (ref 3.5–5.3)
PTH-INTACT SERPL-MCNC: 152.4 PG/ML (ref 14–72)
RBC # BLD AUTO: 3.84 MILLION/UL (ref 3.81–5.12)
SODIUM SERPL-SCNC: 142 MMOL/L (ref 136–145)
WBC # BLD AUTO: 6.48 THOUSAND/UL (ref 4.31–10.16)

## 2018-01-23 PROCEDURE — 36415 COLL VENOUS BLD VENIPUNCTURE: CPT

## 2018-01-23 PROCEDURE — 85027 COMPLETE CBC AUTOMATED: CPT

## 2018-01-23 PROCEDURE — 80069 RENAL FUNCTION PANEL: CPT

## 2018-01-23 PROCEDURE — 83970 ASSAY OF PARATHORMONE: CPT

## 2018-02-06 ENCOUNTER — HOSPITAL ENCOUNTER (INPATIENT)
Facility: HOSPITAL | Age: 73
LOS: 7 days | Discharge: HOME | DRG: 389 | End: 2018-02-14
Attending: EMERGENCY MEDICINE | Admitting: SURGERY
Payer: COMMERCIAL

## 2018-02-06 DIAGNOSIS — R10.84 DIFFUSE ABDOMINAL PAIN: ICD-10-CM

## 2018-02-06 DIAGNOSIS — R11.2 NAUSEA AND VOMITING: ICD-10-CM

## 2018-02-06 DIAGNOSIS — K56.609 SMALL BOWEL OBSTRUCTION (HCC): Primary | ICD-10-CM

## 2018-02-06 DIAGNOSIS — N18.4 CHRONIC KIDNEY DISEASE (CKD), STAGE IV (SEVERE) (HCC): ICD-10-CM

## 2018-02-06 PROCEDURE — 85025 COMPLETE CBC W/AUTO DIFF WBC: CPT | Performed by: EMERGENCY MEDICINE

## 2018-02-06 PROCEDURE — 36415 COLL VENOUS BLD VENIPUNCTURE: CPT | Performed by: EMERGENCY MEDICINE

## 2018-02-06 PROCEDURE — 80053 COMPREHEN METABOLIC PANEL: CPT | Performed by: EMERGENCY MEDICINE

## 2018-02-06 PROCEDURE — 93005 ELECTROCARDIOGRAM TRACING: CPT

## 2018-02-06 PROCEDURE — 83690 ASSAY OF LIPASE: CPT | Performed by: EMERGENCY MEDICINE

## 2018-02-06 PROCEDURE — 0D9670Z DRAINAGE OF STOMACH WITH DRAINAGE DEVICE, VIA NATURAL OR ARTIFICIAL OPENING: ICD-10-PCS | Performed by: SURGERY

## 2018-02-07 ENCOUNTER — APPOINTMENT (EMERGENCY)
Dept: RADIOLOGY | Facility: HOSPITAL | Age: 73
DRG: 389 | End: 2018-02-07
Payer: COMMERCIAL

## 2018-02-07 LAB
ALBUMIN SERPL BCP-MCNC: 3.9 G/DL (ref 3.5–5)
ALP SERPL-CCNC: 111 U/L (ref 46–116)
ALT SERPL W P-5'-P-CCNC: 16 U/L (ref 12–78)
ANION GAP SERPL CALCULATED.3IONS-SCNC: 10 MMOL/L (ref 4–13)
ANION GAP SERPL CALCULATED.3IONS-SCNC: 7 MMOL/L (ref 4–13)
APTT PPP: 105 SECONDS (ref 23–35)
APTT PPP: 174 SECONDS (ref 23–35)
APTT PPP: 33 SECONDS (ref 23–35)
AST SERPL W P-5'-P-CCNC: 23 U/L (ref 5–45)
ATRIAL RATE: 62 BPM
BASOPHILS # BLD AUTO: 0.06 THOUSANDS/ΜL (ref 0–0.1)
BASOPHILS # BLD MANUAL: 0 THOUSAND/UL (ref 0–0.1)
BASOPHILS NFR BLD AUTO: 1 % (ref 0–1)
BASOPHILS NFR MAR MANUAL: 0 % (ref 0–1)
BILIRUB SERPL-MCNC: 0.39 MG/DL (ref 0.2–1)
BUN SERPL-MCNC: 48 MG/DL (ref 5–25)
BUN SERPL-MCNC: 53 MG/DL (ref 5–25)
CALCIUM SERPL-MCNC: 8.7 MG/DL (ref 8.3–10.1)
CALCIUM SERPL-MCNC: 9.5 MG/DL (ref 8.3–10.1)
CHLORIDE SERPL-SCNC: 109 MMOL/L (ref 100–108)
CHLORIDE SERPL-SCNC: 112 MMOL/L (ref 100–108)
CO2 SERPL-SCNC: 21 MMOL/L (ref 21–32)
CO2 SERPL-SCNC: 23 MMOL/L (ref 21–32)
CREAT SERPL-MCNC: 2.43 MG/DL (ref 0.6–1.3)
CREAT SERPL-MCNC: 2.75 MG/DL (ref 0.6–1.3)
EOSINOPHIL # BLD AUTO: 0.07 THOUSAND/ΜL (ref 0–0.61)
EOSINOPHIL # BLD MANUAL: 0.09 THOUSAND/UL (ref 0–0.4)
EOSINOPHIL NFR BLD AUTO: 1 % (ref 0–6)
EOSINOPHIL NFR BLD MANUAL: 1 % (ref 0–6)
ERYTHROCYTE [DISTWIDTH] IN BLOOD BY AUTOMATED COUNT: 15.3 % (ref 11.6–15.1)
ERYTHROCYTE [DISTWIDTH] IN BLOOD BY AUTOMATED COUNT: 15.3 % (ref 11.6–15.1)
GFR SERPL CREATININE-BSD FRML MDRD: 17 ML/MIN/1.73SQ M
GFR SERPL CREATININE-BSD FRML MDRD: 19 ML/MIN/1.73SQ M
GLUCOSE SERPL-MCNC: 141 MG/DL (ref 65–140)
GLUCOSE SERPL-MCNC: 145 MG/DL (ref 65–140)
HCT VFR BLD AUTO: 36.1 % (ref 34.8–46.1)
HCT VFR BLD AUTO: 36.4 % (ref 34.8–46.1)
HGB BLD-MCNC: 11.9 G/DL (ref 11.5–15.4)
HGB BLD-MCNC: 12.3 G/DL (ref 11.5–15.4)
HOLD SPECIMEN: NORMAL
LIPASE SERPL-CCNC: 471 U/L (ref 73–393)
LYMPHOCYTES # BLD AUTO: 0.26 THOUSAND/UL (ref 0.6–4.47)
LYMPHOCYTES # BLD AUTO: 0.66 THOUSANDS/ΜL (ref 0.6–4.47)
LYMPHOCYTES # BLD AUTO: 3 % (ref 14–44)
LYMPHOCYTES NFR BLD AUTO: 8 % (ref 14–44)
MAGNESIUM SERPL-MCNC: 2.3 MG/DL (ref 1.6–2.6)
MCH RBC QN AUTO: 29.6 PG (ref 26.8–34.3)
MCH RBC QN AUTO: 30.1 PG (ref 26.8–34.3)
MCHC RBC AUTO-ENTMCNC: 33 G/DL (ref 31.4–37.4)
MCHC RBC AUTO-ENTMCNC: 33.8 G/DL (ref 31.4–37.4)
MCV RBC AUTO: 89 FL (ref 82–98)
MCV RBC AUTO: 90 FL (ref 82–98)
MONOCYTES # BLD AUTO: 0 THOUSAND/UL (ref 0–1.22)
MONOCYTES # BLD AUTO: 0.34 THOUSAND/ΜL (ref 0.17–1.22)
MONOCYTES NFR BLD AUTO: 4 % (ref 4–12)
MONOCYTES NFR BLD: 0 % (ref 4–12)
NEUTROPHILS # BLD AUTO: 7.05 THOUSANDS/ΜL (ref 1.85–7.62)
NEUTROPHILS # BLD MANUAL: 8.44 THOUSAND/UL (ref 1.85–7.62)
NEUTS SEG NFR BLD AUTO: 86 % (ref 43–75)
NEUTS SEG NFR BLD AUTO: 96 % (ref 43–75)
NRBC BLD AUTO-RTO: 0 /100 WBCS
NRBC BLD AUTO-RTO: 0 /100 WBCS
P AXIS: 31 DEGREES
PHOSPHATE SERPL-MCNC: 3.6 MG/DL (ref 2.3–4.1)
PLATELET # BLD AUTO: 312 THOUSANDS/UL (ref 149–390)
PLATELET # BLD AUTO: 326 THOUSANDS/UL (ref 149–390)
PLATELET BLD QL SMEAR: ADEQUATE
PMV BLD AUTO: 10.1 FL (ref 8.9–12.7)
PMV BLD AUTO: 10.1 FL (ref 8.9–12.7)
POTASSIUM SERPL-SCNC: 4.5 MMOL/L (ref 3.5–5.3)
POTASSIUM SERPL-SCNC: 4.7 MMOL/L (ref 3.5–5.3)
PR INTERVAL: 168 MS
PROT SERPL-MCNC: 7.8 G/DL (ref 6.4–8.2)
QRS AXIS: -2 DEGREES
QRSD INTERVAL: 72 MS
QT INTERVAL: 400 MS
QTC INTERVAL: 406 MS
RBC # BLD AUTO: 4.02 MILLION/UL (ref 3.81–5.12)
RBC # BLD AUTO: 4.08 MILLION/UL (ref 3.81–5.12)
RBC MORPH BLD: NORMAL
SODIUM SERPL-SCNC: 140 MMOL/L (ref 136–145)
SODIUM SERPL-SCNC: 142 MMOL/L (ref 136–145)
T WAVE AXIS: 30 DEGREES
VENTRICULAR RATE: 62 BPM
WBC # BLD AUTO: 8.23 THOUSAND/UL (ref 4.31–10.16)
WBC # BLD AUTO: 8.79 THOUSAND/UL (ref 4.31–10.16)

## 2018-02-07 PROCEDURE — 36415 COLL VENOUS BLD VENIPUNCTURE: CPT | Performed by: STUDENT IN AN ORGANIZED HEALTH CARE EDUCATION/TRAINING PROGRAM

## 2018-02-07 PROCEDURE — 83735 ASSAY OF MAGNESIUM: CPT | Performed by: STUDENT IN AN ORGANIZED HEALTH CARE EDUCATION/TRAINING PROGRAM

## 2018-02-07 PROCEDURE — 84100 ASSAY OF PHOSPHORUS: CPT | Performed by: STUDENT IN AN ORGANIZED HEALTH CARE EDUCATION/TRAINING PROGRAM

## 2018-02-07 PROCEDURE — G8978 MOBILITY CURRENT STATUS: HCPCS

## 2018-02-07 PROCEDURE — 80048 BASIC METABOLIC PNL TOTAL CA: CPT | Performed by: STUDENT IN AN ORGANIZED HEALTH CARE EDUCATION/TRAINING PROGRAM

## 2018-02-07 PROCEDURE — 99223 1ST HOSP IP/OBS HIGH 75: CPT | Performed by: SURGERY

## 2018-02-07 PROCEDURE — 85730 THROMBOPLASTIN TIME PARTIAL: CPT | Performed by: STUDENT IN AN ORGANIZED HEALTH CARE EDUCATION/TRAINING PROGRAM

## 2018-02-07 PROCEDURE — 97535 SELF CARE MNGMENT TRAINING: CPT

## 2018-02-07 PROCEDURE — G8979 MOBILITY GOAL STATUS: HCPCS

## 2018-02-07 PROCEDURE — 96375 TX/PRO/DX INJ NEW DRUG ADDON: CPT

## 2018-02-07 PROCEDURE — 97163 PT EVAL HIGH COMPLEX 45 MIN: CPT

## 2018-02-07 PROCEDURE — 74176 CT ABD & PELVIS W/O CONTRAST: CPT

## 2018-02-07 PROCEDURE — G8987 SELF CARE CURRENT STATUS: HCPCS

## 2018-02-07 PROCEDURE — 97167 OT EVAL HIGH COMPLEX 60 MIN: CPT

## 2018-02-07 PROCEDURE — G8988 SELF CARE GOAL STATUS: HCPCS

## 2018-02-07 PROCEDURE — 96374 THER/PROPH/DIAG INJ IV PUSH: CPT

## 2018-02-07 PROCEDURE — 85730 THROMBOPLASTIN TIME PARTIAL: CPT | Performed by: SURGERY

## 2018-02-07 PROCEDURE — 93010 ELECTROCARDIOGRAM REPORT: CPT | Performed by: INTERNAL MEDICINE

## 2018-02-07 PROCEDURE — C9113 INJ PANTOPRAZOLE SODIUM, VIA: HCPCS | Performed by: STUDENT IN AN ORGANIZED HEALTH CARE EDUCATION/TRAINING PROGRAM

## 2018-02-07 PROCEDURE — 99285 EMERGENCY DEPT VISIT HI MDM: CPT

## 2018-02-07 PROCEDURE — 85007 BL SMEAR W/DIFF WBC COUNT: CPT | Performed by: STUDENT IN AN ORGANIZED HEALTH CARE EDUCATION/TRAINING PROGRAM

## 2018-02-07 PROCEDURE — 85027 COMPLETE CBC AUTOMATED: CPT | Performed by: STUDENT IN AN ORGANIZED HEALTH CARE EDUCATION/TRAINING PROGRAM

## 2018-02-07 PROCEDURE — 96361 HYDRATE IV INFUSION ADD-ON: CPT

## 2018-02-07 PROCEDURE — 74018 RADEX ABDOMEN 1 VIEW: CPT

## 2018-02-07 RX ORDER — SODIUM BICARBONATE 650 MG/1
1 TABLET ORAL 2 TIMES DAILY
COMMUNITY
Start: 2017-01-23 | End: 2018-03-08 | Stop reason: SDUPTHER

## 2018-02-07 RX ORDER — ONDANSETRON 2 MG/ML
4 INJECTION INTRAMUSCULAR; INTRAVENOUS ONCE
Status: COMPLETED | OUTPATIENT
Start: 2018-02-07 | End: 2018-02-07

## 2018-02-07 RX ORDER — HYDRALAZINE HYDROCHLORIDE 20 MG/ML
5 INJECTION INTRAMUSCULAR; INTRAVENOUS EVERY 6 HOURS PRN
Status: DISCONTINUED | OUTPATIENT
Start: 2018-02-07 | End: 2018-02-07

## 2018-02-07 RX ORDER — HYDRALAZINE HYDROCHLORIDE 20 MG/ML
10 INJECTION INTRAMUSCULAR; INTRAVENOUS EVERY 6 HOURS PRN
Status: DISCONTINUED | OUTPATIENT
Start: 2018-02-07 | End: 2018-02-14 | Stop reason: HOSPADM

## 2018-02-07 RX ORDER — ONDANSETRON 2 MG/ML
4 INJECTION INTRAMUSCULAR; INTRAVENOUS EVERY 6 HOURS PRN
Status: DISCONTINUED | OUTPATIENT
Start: 2018-02-07 | End: 2018-02-14 | Stop reason: HOSPADM

## 2018-02-07 RX ORDER — COLESEVELAM HYDROCHLORIDE 625 MG/1
TABLET, FILM COATED ORAL AS NEEDED
Refills: 2 | COMMUNITY
Start: 2017-12-28 | End: 2020-10-23 | Stop reason: ALTCHOICE

## 2018-02-07 RX ORDER — PANTOPRAZOLE SODIUM 40 MG/1
40 INJECTION, POWDER, FOR SOLUTION INTRAVENOUS
Status: DISCONTINUED | OUTPATIENT
Start: 2018-02-07 | End: 2018-02-14 | Stop reason: HOSPADM

## 2018-02-07 RX ORDER — SACCHAROMYCES BOULARDII 250 MG
1 CAPSULE ORAL DAILY
COMMUNITY
End: 2021-08-19 | Stop reason: SDUPTHER

## 2018-02-07 RX ORDER — HEPARIN SODIUM 10000 [USP'U]/100ML
3-30 INJECTION, SOLUTION INTRAVENOUS
Status: DISCONTINUED | OUTPATIENT
Start: 2018-02-07 | End: 2018-02-14

## 2018-02-07 RX ORDER — MIDAZOLAM HYDROCHLORIDE 1 MG/ML
1 INJECTION INTRAMUSCULAR; INTRAVENOUS ONCE
Status: COMPLETED | OUTPATIENT
Start: 2018-02-07 | End: 2018-02-07

## 2018-02-07 RX ORDER — MORPHINE SULFATE 2 MG/ML
2 INJECTION, SOLUTION INTRAMUSCULAR; INTRAVENOUS ONCE
Status: COMPLETED | OUTPATIENT
Start: 2018-02-07 | End: 2018-02-07

## 2018-02-07 RX ORDER — METOPROLOL TARTRATE 5 MG/5ML
5 INJECTION INTRAVENOUS EVERY 6 HOURS PRN
Status: DISCONTINUED | OUTPATIENT
Start: 2018-02-07 | End: 2018-02-07

## 2018-02-07 RX ORDER — MORPHINE SULFATE 4 MG/ML
4 INJECTION, SOLUTION INTRAMUSCULAR; INTRAVENOUS ONCE
Status: COMPLETED | OUTPATIENT
Start: 2018-02-07 | End: 2018-02-07

## 2018-02-07 RX ORDER — SODIUM CHLORIDE 9 MG/ML
100 INJECTION, SOLUTION INTRAVENOUS CONTINUOUS
Status: DISCONTINUED | OUTPATIENT
Start: 2018-02-07 | End: 2018-02-08

## 2018-02-07 RX ORDER — CALCITRIOL 0.25 UG/1
1 CAPSULE, LIQUID FILLED ORAL DAILY
COMMUNITY
Start: 2017-01-23 | End: 2018-03-16 | Stop reason: SDUPTHER

## 2018-02-07 RX ORDER — METHYLPREDNISOLONE SODIUM SUCCINATE 125 MG/2ML
125 INJECTION, POWDER, LYOPHILIZED, FOR SOLUTION INTRAMUSCULAR; INTRAVENOUS ONCE
Status: COMPLETED | OUTPATIENT
Start: 2018-02-07 | End: 2018-02-07

## 2018-02-07 RX ORDER — LABETALOL HYDROCHLORIDE 5 MG/ML
10 INJECTION, SOLUTION INTRAVENOUS EVERY 6 HOURS PRN
Status: DISCONTINUED | OUTPATIENT
Start: 2018-02-07 | End: 2018-02-14 | Stop reason: HOSPADM

## 2018-02-07 RX ORDER — MORPHINE SULFATE 10 MG/ML
6 INJECTION, SOLUTION INTRAMUSCULAR; INTRAVENOUS ONCE
Status: DISCONTINUED | OUTPATIENT
Start: 2018-02-07 | End: 2018-02-07

## 2018-02-07 RX ADMIN — MORPHINE SULFATE 4 MG: 4 INJECTION INTRAVENOUS at 00:48

## 2018-02-07 RX ADMIN — SODIUM CHLORIDE 500 ML: 0.9 INJECTION, SOLUTION INTRAVENOUS at 00:22

## 2018-02-07 RX ADMIN — TOPICAL ANESTHETIC 2 SPRAY: 200 SPRAY DENTAL; PERIODONTAL at 01:58

## 2018-02-07 RX ADMIN — HEPARIN SODIUM 18 UNITS/KG/HR: 10000 INJECTION, SOLUTION INTRAVENOUS at 05:05

## 2018-02-07 RX ADMIN — HEPARIN SODIUM 13 UNITS/KG/HR: 10000 INJECTION, SOLUTION INTRAVENOUS at 23:37

## 2018-02-07 RX ADMIN — MORPHINE SULFATE 2 MG: 2 INJECTION, SOLUTION INTRAMUSCULAR; INTRAVENOUS at 00:48

## 2018-02-07 RX ADMIN — Medication 1 SPRAY: at 04:38

## 2018-02-07 RX ADMIN — HYDRALAZINE HYDROCHLORIDE 10 MG: 20 INJECTION INTRAMUSCULAR; INTRAVENOUS at 13:13

## 2018-02-07 RX ADMIN — SODIUM CHLORIDE 100 ML/HR: 0.9 INJECTION, SOLUTION INTRAVENOUS at 04:42

## 2018-02-07 RX ADMIN — ONDANSETRON 4 MG: 2 INJECTION INTRAMUSCULAR; INTRAVENOUS at 00:56

## 2018-02-07 RX ADMIN — PANTOPRAZOLE SODIUM 40 MG: 40 INJECTION, POWDER, FOR SOLUTION INTRAVENOUS at 09:26

## 2018-02-07 RX ADMIN — Medication 1 SPRAY: at 19:07

## 2018-02-07 RX ADMIN — MIDAZOLAM 1 MG: 1 INJECTION INTRAMUSCULAR; INTRAVENOUS at 02:00

## 2018-02-07 RX ADMIN — LABETALOL 20 MG/4 ML (5 MG/ML) INTRAVENOUS SYRINGE 10 MG: at 16:19

## 2018-02-07 RX ADMIN — METOPROLOL TARTRATE 5 MG: 1 INJECTION, SOLUTION INTRAVENOUS at 10:04

## 2018-02-07 RX ADMIN — METHYLPREDNISOLONE SODIUM SUCCINATE 125 MG: 125 INJECTION, POWDER, FOR SOLUTION INTRAMUSCULAR; INTRAVENOUS at 01:59

## 2018-02-07 RX ADMIN — SODIUM CHLORIDE 100 ML/HR: 0.9 INJECTION, SOLUTION INTRAVENOUS at 14:51

## 2018-02-07 NOTE — PHYSICAL THERAPY NOTE
Physical Therapy Evaluation  Patient Name: Nadja Ram    KZWDA'L Date: 2/7/2018     Problem List  Patient Active Problem List   Diagnosis    CKD (chronic kidney disease) stage 4, GFR 15-29 ml/min (HCC)    Chronic saddle pulmonary embolism (HCC)    Chronic kidney disease (CKD), stage IV (severe) ([facility] [address] )    Bladder mass        Past Medical History  Past Medical History:   Diagnosis Date    Anxiety     Chronic kidney disease (CKD), [facility] IV (severe) ([facility])     stage IV    Chronic thrombosis of subclavian vein ([facility] [address] )     right    Hydronephrosis     Hypertension     Hypothyroid     Incontinence     Lung mass     Improving on PET/CT 1/2016    Polycythemia vera ([facility] [address] )     Pulmonary embolism ([facility] [address] ) 2014    Urinary tract infection         Past Surgical History  Past Surgical History:   Procedure Laterality Date    BLADDER SUSPENSION      BOTOX INJECTION N/A 7/27/2016    Procedure: BOTOX INJECTION ;  Surgeon: German Sher MD;  [facility];  Service:     CHOLECYSTECTOMY N/A     COLONOSCOPY      CYSTECTOMY, RADICAL WITH ILEOCONDUIT N/A 10/4/2016    Procedure: Theo Camacho ;  Surgeon: German Sher MD;  [facility];  Service:    Lisa Claudio W/ RETROGRADES Bilateral 7/27/2016    Procedure: [facility]; RETROGRADE PYELOGRAM ;  Surgeon: German Sher MD;  [facility];  Service:     WY COLONOSCOPY FLX DX W/COLLJ SPEC WHEN PFRMD N/A 8/31/2016    Procedure: COLONOSCOPY;  Surgeon: Lora Griffith MD;  Location: [facility];   Service: Gastroenterology    WY CYSTOSCOPY,INSERT URETERAL STENT Bilateral 7/27/2016    Procedure: STENT INSERTION; EXCISION OF MESH ;  Surgeon: German Sher MD;  [facility];  Service: Urology    TONSILLECTOMY      TUBAL LIGATION      WISDOM TOOTH EXTRACTION           02/07/18 1115   Note Type   Note type Eval only   Pain Assessment   Pain Assessment No/denies pain Pain Score No Pain   Home Living   Type of [address] [facility] Multi-level;Stairs to enter with rails  (6 MARLON with L HR, FF with bilat HR)   Bathroom Shower/Tub Walk-in shower   Bathroom Equipment Shower chair;Grab bars in [facility] [address]   Additional Comments no bathroom on first flr   Prior Function   Level of [place] Independent with ADLs and functional mobility   Lives With Son  (49 year old son with autism who is indep with mobility )   ADL Assistance Independent   IADLs Independent   Falls in the last 6 months 1 to 4  (fell off [place] step on 2/4 when she missed a step )   Vocational Retired   Comments pt is recently   dtr from [facility] currently staying with pt's son at home   Restrictions/Precautions   Weight Bearing Precautions Per Order No   Other Precautions Fall Risk;Multiple lines  (IV, NG tube, NPO)   General   Additional Pertinent History Hx of trigonal cystectomy with ileal conduit,  , CKD, chronic saddle pulmonary embolism, bladder mass   Family/Caregiver Present No   Cognition   Overall Cognitive Status WFL   Arousal/Participation Alert   Orientation Level Oriented X4   Memory Within functional limits   Following Commands Follows all commands and directions without difficulty   RLE Assessment   RLE Assessment WFL   Strength RLE   RLE Overall Strength 4+/5   LLE Assessment   LLE Assessment WFL   Strength LLE   LLE Overall Strength 4+/5   Coordination   Sensation WFL   Light Touch   RLE Light Touch Grossly intact   LLE Light Touch Grossly intact   Proprioception   RLE Proprioception Grossly intact   LLE Proprioception Grossly Intact   Bed Mobility   Rolling R 6  Modified independent   Rolling L 6  Modified independent   Supine to Sit 5  Supervision   Additional items Bedrails; Increased time required   Sit to Supine 5  Supervision   Additional items Bedrails; Increased time required   Transfers   Sit to Stand 4  Minimal assistance  (CG- min)   Additional items Increased time required   Stand to Sit 4  Minimal assistance   Additional items Increased time required   Stand pivot 4  Minimal assistance   Additional items Increased time required  (with RW)   Ambulation/Elevation   Gait pattern Improper Weight shift; Inconsistent wisam   Gait Assistance 4  Minimal assist  (min-CG)   Assistive Device Rolling walker   Distance 40   Stair Management Assistance (will assess tomorrow)   Additional items Not tested   Balance   Static Sitting Normal   Dynamic Sitting Good   Static Standing Fair +   Dynamic Standing Fair   Ambulatory Fair   Activity Tolerance   Activity Tolerance Patient limited by fatigue   Nurse Made Aware JOHNATHAN [place]   Assessment   Prognosis Excellent   Problem List Decreased strength;Decreased endurance; Impaired balance;Decreased mobility   Assessment pt is a 71year old female with Hx of trigonal cystectomy with ileal conduit in 2016, chronic saddle pulmonary embolism, CKD, bladder mass, who is admitted due to small bowel obstruction  Pt currently has IV & NG tube which was disconnected by RN during therapy and was reconnected after session  Pt presents with dec standing balance and overall endurance requiring min-CG to complete functional mobilities using a RW  Pt tolerated PT session with no complaints of pain or dizziness nor demonstrated any LOB during functional activities  Pt will benefit from skilled PT services while in acute care to continue to assess need for AD and progress functional mobility indep to facilitate a safe home d/c  Pt was put on the recliner per her preference with pumps on and call bell/phone/table within reach at end of tx session  Barriers to Discharge Inaccessible home environment;Decreased caregiver support   Barriers to Discharge Comments 6 MARLON with L HR, FF with bilat HR to access bathroom/bedroom, no bathroom on first floor   limited local family support   Goals   Patient Goals to go home   STG Expiration Date 02/14/18   Short Term Goal #1 pt to demonstrate mod indep with transfers and amb using LRAD  Short Term Goal #2 pt to ascend/descend FF with HR at mod indep level      Plan   Treatment/Interventions Functional transfer training;Bed mobility;Gait training;Equipment eval/education;Spoke to nursing   PT Frequency 5x/wk   Recommendation   Recommendation Home independently;Home PT   Equipment Recommended Walker  (will continue to assess need for AD in PT)   PT - OK to Discharge No   Barthel Index   Feeding 10   Bathing 0   Grooming Score 0   Dressing Score 5   Bladder Score 0   Bowels Score 10   Toilet Use Score 5   Transfers (Bed/Chair) Score 10   Mobility (Level Surface) Score 10   Stairs Score 0   Barthel Index Score 50

## 2018-02-07 NOTE — PROGRESS NOTES
BP still elevated 180/85 after receiving 5mg IV lopressor at 1000am  spoke with red surgery resident( in the OR currently) & new  Verbal order for hydralazine 10 mg IV Q 6hrs prn

## 2018-02-07 NOTE — ED PROVIDER NOTES
History  Chief Complaint   Patient presents with    Abdominal Pain     abdominal pain, vomited x4, no diarrhea, no SOB, feels "faint", stage 4 kidney failure, not on dialysis     69-year-old female with past medical history of chronic kidney disease stage 4, chronic pulmonary embolism anticoagulated on Xarelto, polycythemia vera, hypertension, hypothyroidism, and urinary incontinence status post urostomy who is presenting with diffuse abdominal pain and vomiting  Patient states that symptoms began approximately 1600  Prior to this, she felt well  Patient states that the abdominal pain is diffuse and is constant  It varies in intensity, reaching as high as 8/10 in intensity  It is mild now  Patient reports associated vomiting, 4 episodes of nonbloody, nonbilious emesis  Patient has not attempted to eat after developing the symptoms  She did attempt to drink ginger ale but vomited it up  Patient reports that her last bowel movement was yesterday morning  It was normal per patient  Of note, patient does have a history of multiple abdominal surgeries  This includes cholecystectomy and urostomy  Review of systems is otherwise negative  Patient denies fever, chills, diaphoresis, unintentional weight loss, headache, vision changes, extremity numbness or weakness, chest pain or pressure, palpitations, shortness of breath, cough, diarrhea, constipation, melena, hematochezia, dysuria, urinary frequency, and hematuria  Plan: CBC to evaluate for leukocytosis, anemia, and thrombocytopenia; CMP to evaluate for electrolyte abnormalities, renal function, and hepatobiliary pathology; lipase to evaluate for pancreatitis; urinalysis to evaluate for UTI and hematuria  CT of the abdomen and pelvis without IV contrast to evaluate for small bowel obstruction or other acute intra-abdominal pathology  IV fluid bolus  Patient declined pain medication and antiemetics at this time      Differential diagnosis includes, but is not limited to small-bowel obstruction, ileus, GI tract neoplasia, gastritis, far less likely pancreatitis, far less likely mesenteric ischemia  Prior to Admission Medications   Prescriptions Last Dose Informant Patient Reported? Taking? Cholecalciferol (VITAMIN D3) 1000 UNITS CAPS 2/6/2018 at Unknown time  Yes Yes   Sig: Take 1,000 unit marking on U-100 syringe by mouth daily     WELCHOL 625 MG tablet 2/6/2018 at Unknown time  Yes Yes   Sig: TAKE 1 TABLET AT BEDTIME AT 8PM   amLODIPine (NORVASC) 5 mg tablet 2/6/2018 at Unknown time  Yes Yes   Sig: Take 2 5 mg by mouth daily     apixaban (ELIQUIS) 2 5 mg 2/6/2018 at Unknown time  Yes Yes   Sig: Take 1 tablet by mouth daily   calcitriol (ROCALTROL) 0 25 mcg capsule 2/6/2018 at Unknown time  Yes Yes   Sig: Take 1 capsule by mouth daily   levothyroxine 75 mcg tablet 2/6/2018 at Unknown time  No Yes   Sig: Take 1 tablet by mouth daily in the early morning for 30 days   metoprolol tartrate (LOPRESSOR) 25 mg tablet 2/6/2018 at Unknown time  Yes Yes   Sig: Take 0 5 tablets by mouth 2 (two) times a day   ruxolitinib (JAKAFI) 10 mg tablet 2/6/2018 at Unknown time  Yes Yes   Sig: Take by mouth   saccharomyces boulardii (FLORASTOR) 250 mg capsule 2/6/2018 at Unknown time  Yes Yes   Sig: Take 1 capsule by mouth 2 (two) times a day   sodium bicarbonate 650 mg tablet 2/6/2018 at Unknown time  Yes Yes   Sig: Take 1 tablet by mouth Twice daily      Facility-Administered Medications: None       Past Medical History:   Diagnosis Date    Anxiety     Chronic kidney disease (CKD), stage IV (severe) (HCC)     stage IV    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    Hydronephrosis     Hypertension     Hypothyroid     Incontinence     Lung mass     Improving on PET/CT 1/2016    Polycy[facility] vera ([facility] [address]     Pulmonary embolism ([facility] [address] ) 2014    Urinary tract infection        Past Surgical History:   Procedure Laterality Date    BLADDER SUSPENSION      BOTOX INJECTION N/A 7/27/2016    Procedure: BOTOX INJECTION ;  Surgeon: Chasity Connell MD;  [facility];  Service:    Kaveh teobartolome [address], RADICAL WITH ILEOCONDUIT N/A 10/4/2016    Procedure: Natali Ana Rosa WITH ILEAL CONDUIT ;  Surgeon: Chasity Connell MD;  [facility];  Service:    Carlos Eduardo Stanchfield CYSTOSCOPY W/ RETROGRADES Bilateral 7/27/2016    Procedure: Ghulam Mcburney; RETROGRADE PYELOGRAM ;  Surgeon: Chasity Connell MD;  [facility];  Service:     OR COLONOSCOPY FLX DX W/COLLJ [address] PFRMD N/A 8/31/2016    Procedure: COLONOSCOPY;  Surgeon: Prabhakar Valenzuela MD;  Location: [facility]; Service: Gastroenterology    OR CYSTOSCOPY,INSERT URETERAL STENT Bilateral 7/27/2016    Procedure: STENT INSERTION; EXCISION OF MESH ;  Surgeon: Chasity Connell MD;  [facility];  Service: Urology    TONSILLECTOMY      TUBAL LIGATION      WISDOM TOOTH EXTRACTION         History reviewed  No pertinent family history  I have reviewed and agree with the history as documented  Social History   Substance Use Topics    Smoking status: Never Smoker    Smokeless tobacco: Never Used    Alcohol use Yes      Comment: occasionally        Review of Systems   Constitutional: Negative for diaphoresis, fever and unexpected weight change  HENT: Negative for congestion, rhinorrhea and sore throat  Eyes: Negative for pain, discharge and visual disturbance  Respiratory: Negative for cough, shortness of breath and wheezing  Cardiovascular: Negative for chest pain, palpitations and leg swelling  Gastrointestinal: Positive for abdominal pain, nausea and vomiting  Negative for blood in stool, constipation and diarrhea  Genitourinary: Negative for dysuria, flank pain and hematuria  Musculoskeletal: Negative for arthralgias and joint swelling  Skin: Negative for rash and wound  Allergic/Immunologic: Negative for environmental allergies and food allergies     Neurological: Negative for dizziness, seizures, weakness and numbness  Hematological: Negative for adenopathy  Psychiatric/Behavioral: Negative for confusion and hallucinations  Physical Exam  ED Triage Vitals [02/06/18 2313]   Temperature Pulse Respirations Blood Pressure SpO2   97 5 °F (36 4 °C) 72 18 (!) 194/122 99 %      Temp Source Heart Rate Source Patient Position - Orthostatic VS BP Location FiO2 (%)   Tympanic Monitor Sitting Left arm --      Pain Score       7           Orthostatic Vital Signs  Vitals:    02/07/18 0004 02/07/18 0156 02/07/18 0230 02/07/18 0551   BP: 170/76 (!) 168/104 168/92 (!) 184/98   Pulse: 62 97 74 78   Patient Position - Orthostatic VS: Lying Sitting         Physical Exam   Constitutional: She is oriented to person, place, and time  She appears well-developed and well-nourished  No distress  HENT:   Head: Normocephalic and atraumatic  Right Ear: External ear normal    Left Ear: External ear normal    Eyes: Conjunctivae and EOM are normal  Pupils are equal, round, and reactive to light  Neck: Normal range of motion  Neck supple  Cardiovascular: Normal rate, regular rhythm and normal heart sounds  No murmur heard  Pulmonary/Chest: Effort normal and breath sounds normal  No respiratory distress  She has no wheezes  She has no rales  Abdominal: Soft  Bowel sounds are normal  She exhibits no distension  There is no tenderness  There is no guarding  Abdomen is obese  There is a urostomy in the suprapubic region which appears pink  Urine output is good and the urine is clear  Bowel sounds are normally active  There is no tenderness to palpation  No guarding or peritoneal signs  Musculoskeletal: Normal range of motion  She exhibits no deformity  Neurological: She is alert and oriented to person, place, and time  She exhibits normal muscle tone  Skin: Skin is warm and dry  Capillary refill takes less than 2 seconds  Psychiatric: She has a normal mood and affect   Her behavior is normal  Thought content normal    Nursing note and vitals reviewed  ED Medications  Medications   sodium chloride 0 9 % infusion (100 mL/hr Intravenous New Bag 2/7/18 0442)   ondansetron (ZOFRAN) injection 4 mg (not administered)   pantoprazole (PROTONIX) injection 40 mg (not administered)   HYDROmorphone (DILAUDID) injection 0 5 mg (not administered)   HYDROmorphone (DILAUDID) injection 0 2 mg (not administered)   phenol (CHLORASEPTIC) 1 4 % mucosal liquid 1 spray (1 spray Mouth/Throat Given 2/7/18 0438)   metoprolol (LOPRESSOR) injection 5 mg (not administered)   heparin (porcine) 25,000 units in 250 mL infusion (premix) (18 Units/kg/hr × 80 kg (Order-Specific) Intravenous New Bag 2/7/18 0505)   sodium chloride 0 9 % bolus 500 mL (0 mL Intravenous Stopped 2/7/18 0122)   morphine (PF) 4 mg/mL injection 4 mg (4 mg Intravenous Given 2/7/18 0048)   morphine injection 2 mg (2 mg Intravenous Given 2/7/18 0048)   ondansetron (ZOFRAN) injection 4 mg (4 mg Intravenous Given 2/7/18 0056)   methylPREDNISolone sodium succinate (Solu-MEDROL) injection 125 mg (125 mg Intravenous Given 2/7/18 0159)   midazolam (VERSED) injection 1 mg (1 mg Intravenous Given 2/7/18 0200)   benzocaine (HURRICAINE) 20 % mucosal spray 2 spray (2 sprays Mucosal Given 2/7/18 0158)       Diagnostic Studies  Results Reviewed     Procedure Component Value Units Date/Time    CBC and differential [[id number]]  (Abnormal) Collected:  02/07/18 0449    Lab Status:  Final result Specimen:  Blood from Arm, Right Updated:  02/07/18 0548     WBC 8 79 Thousand/uL      RBC 4 02 Million/uL      Hemoglobin 11 9 g/dL      Hematocrit 36 1 %      MCV 90 fL      MCH 29 6 pg      MCHC 33 0 g/dL      RDW 15 3 (H) %      MPV 10 1 fL      Platelets 456 Thousands/uL      nRBC 0 /100 WBCs     Narrative: This is an appended report  These results have been appended to a previously verified report      Basic metabolic panel [[id number]]  (Abnormal) Collected: 02/07/18 0449    Lab Status:  Final result Specimen:  Blood from Arm, Right Updated:  02/07/18 0529     Sodium 140 mmol/L      Potassium 4 7 mmol/L      Chloride 112 (H) mmol/L      CO2 21 mmol/L      Anion Gap 7 mmol/L      BUN 48 (H) mg/dL      Creatinine 2 43 (H) mg/dL      Glucose 145 (H) mg/dL      Calcium 8 7 mg/dL      eGFR 19 ml/min/1 73sq m     Narrative:         National Kidney Disease Education Program recommendations are as follows:  GFR calculation is accurate only with a steady state creatinine  Chronic Kidney disease less than 60 ml/min/1 73 sq  meters  Kidney failure less than 15 ml/min/1 73 sq  meters  Magnesium [[id number]]  (Normal) Collected:  02/07/18 0449    Lab Status:  Final result Specimen:  Blood from Arm, Right Updated:  02/07/18 0520     Magnesium 2 3 mg/dL     Phosphorus [[id number]]  (Normal) Collected:  02/07/18 0449    Lab Status:  Final result Specimen:  Blood from Arm, Right Updated:  02/07/18 0520     Phosphorus 3 6 mg/dL     APTT [[id number]]  (Normal) Collected:  02/07/18 0449    Lab Status:  Final result Specimen:  Blood from Arm, Right Updated:  02/07/18 0516     PTT 33 seconds     Narrative:          Therapeutic Heparin Range = 60-90 seconds    Comprehensive metabolic panel [[id number]]  (Abnormal) Collected:  02/06/18 1920    Lab Status:  Final result Specimen:  Blood from Arm, Right Updated:  02/07/18 0020     Sodium 142 mmol/L      Potassium 4 5 mmol/L      Chloride 109 (H) mmol/L      CO2 23 mmol/L      Anion Gap 10 mmol/L      BUN 53 (H) mg/dL      Creatinine 2 75 (H) mg/dL      Glucose 141 (H) mg/dL      Calcium 9 5 mg/dL      AST 23 U/L      ALT 16 U/L      Alkaline Phosphatase 111 U/L      Total Protein 7 8 g/dL      Albumin 3 9 g/dL      Total Bilirubin 0 39 mg/dL      eGFR 17 ml/min/1 73sq m     Narrative:         National Kidney Disease Education Program recommendations are as follows:  GFR calculation is accurate only with a steady state creatinine  Chronic Kidney disease less than 60 ml/min/1 73 sq  meters  Kidney failure less than 15 ml/min/1 73 sq  meters  Lipase [[id number]]  (Abnormal) Collected:  02/06/18 2350    Lab Status:  Final result Specimen:  Blood from Arm, Right Updated:  02/07/18 0020     Lipase 471 (H) u/L     CBC and differential [[id number]]  (Abnormal) Collected:  02/06/18 2350    Lab Status:  Final result Specimen:  Blood from Arm, Right Updated:  02/07/18 0003     WBC 8 23 Thousand/uL      RBC 4 08 Million/uL      Hemoglobin 12 3 g/dL      Hematocrit 36 4 %      MCV 89 fL      MCH 30 1 pg      MCHC 33 8 g/dL      RDW 15 3 (H) %      MPV 10 1 fL      Platelets 257 Thousands/uL      nRBC 0 /100 WBCs      Neutrophils Relative 86 (H) %      Lymphocytes Relative 8 (L) %      Monocytes Relative 4 %      Eosinophils Relative 1 %      Basophils Relative 1 %      Neutrophils Absolute 7 05 Thousands/µL      Lymphocytes Absolute 0 66 Thousands/µL      Monocytes Absolute 0 34 Thousand/µL      Eosinophils Absolute 0 07 Thousand/µL      Basophils Absolute 0 06 Thousands/µL                  XR abdomen 1 vw portable   ED Interpretation by Lana Lopez MD (02/07 0253)   NG tube in appropriate position below the diaphragm  by Devon Parrish (92/25 9713)      CT abdomen pelvis wo contrast   Final Result by Caprice Dior MD (02/07 0144)      Fluid and gas-filled distended small bowel loops with transition point in the left mid abdomen compatible with small bowel obstruction  No free air  Distended stomach  Consider nasogastric decompression        I personally discussed this study with [address] [place] [address] on 2/7/2018 at 1:43 AM                      Workstation performed: [id number]               Procedures  ECG 12 Lead Documentation  Date/Time: 2/7/2018 12:06 AM  Performed by: Jeffrey Leon by: Kati Krause     ECG reviewed by me, the ED Provider: yes    Patient location:  ED  Previous ECG:     Previous ECG:  Compared to current    Comparison ECG info:  October 7, 2016    Similarity:  Changes noted    Comparison to cardiac monitor: Yes    Interpretation:     Interpretation: non-specific    Rate:     ECG rate:  62    ECG rate assessment: normal    Rhythm:     Rhythm: sinus rhythm    Ectopy:     Ectopy: none    QRS:     QRS axis:  Left    QRS intervals:  Normal  Conduction:     Conduction: normal    ST segments:     ST segments:  Normal  T waves:     T waves: flattening    Comments:      EKG demonstrates normal sinus rhythm at 62 beats per minute  Axis is left  There is flattening of the T-wave in lead III  When compared with prior EKG, there has been resolution of T-wave inversion in lead 3 and reappearance of T-wave in AVF  Overall, nonspecific EKG  Phone Consults  ED Phone Contact    ED Course  ED Course as of Feb 07 0658   Tue Feb 06, 2018   2327 Blood Pressure: (!) 194/122   2327 Temperature: 97 5 °F (36 4 °C)   2327 Pulse: 72   2327 Respirations: 18   2327 Triage vitals noted  Hypertension  Wed Feb 07, 2018   0005 CBC unremarkable  0021 Improved without intervention  Blood Pressure: 170/76   0022 Lipase: (!) 471   0022 CMP at baseline  0215 NG tube placed, on low continuous suction  X-ray ordered to confirm placement  MDM  Number of Diagnoses or Management Options  Chronic kidney disease (CKD), stage IV (severe) ([facility] [address] ): established and worsening  Diffuse abdominal pain: new and requires workup  Nausea and vomiting: new and requires workup  Small bowel obstruction: new and requires workup  Diagnosis management comments:     73-year-old female with numerous medical comorbidities and history of multiple prior abdominal surgeries presenting with diffuse abdominal pain with associated nausea and vomiting  Found to have small-bowel obstruction on CT abdomen and pelvis      1   Small-bowel obstruction:  As detailed in the history of present illness, patient presented with acute onset diffuse abdominal pain with associated nausea and vomiting  No other symptoms  Vital signs significant for hypertension  Labs were obtained and reviewed above  No acute abnormalities  CT of the abdomen and pelvis was ordered due to our high index of suspicion for small bowel obstruction  CT confirmed the presence of a small bowel obstruction with a transition point in the left mid abdomen  NG tube was placed  Surgery was consulted  They evaluated the patient and admitted the patient to their service  Portions of the chart may have been created with voice recognition software  Occasional wrong word or "sound a like" substitutions may have occurred due to the inherent limitations of voice recognition software  Please read the chart carefully and recognize, using context, where substitutions have occurred         Amount and/or Complexity of Data Reviewed  Clinical lab tests: ordered and reviewed  Tests in the radiology section of CPT®: ordered and reviewed  Decide to obtain previous medical records or to obtain history from someone other than the patient: yes  Obtain history from someone other than the patient: yes  Review and summarize past medical records: yes  Discuss the patient with other providers: yes  Independent visualization of images, tracings, or specimens: yes    Risk of Complications, Morbidity, and/or Mortality  Presenting problems: moderate  Diagnostic procedures: minimal  Management options: minimal    Patient Progress  Patient progress: improved    CritCare Time    Disposition  Final diagnoses:   Small bowel obstruction   Chronic kidney disease (CKD), stage IV (severe) (HCC)   Nausea and vomiting   Diffuse abdominal pain     Time reflects when diagnosis was documented in both MDM as applicable and the Disposition within this note     Time User Action Codes Description Comment    2/7/2018  1:51 AM Isa Crum Add [Q79 441] Small bowel obstruction     2/7/2018  6:52 AM Isa Crum Add [N18 4] Chronic kidney disease (CKD), stage IV (severe) ([facility] [address] )     2/7/2018  6:52 AM Ermalene Bitters Add [R11 2] Nausea and vomiting     2/7/2018  6:52 AM Ermalene Bitters Add [R10 84] Diffuse abdominal pain       ED Disposition     ED Disposition Condition Comment    Admit  Case was discussed with Surgery and the patient's admission status was agreed to be Admission Status: inpatient status to the service of Dr Michelle Bay  Follow-up Information    None       Patient's Medications   Discharge Prescriptions    No medications on file     No discharge procedures on file  ED Provider  Attending physically available and evaluated Allandelfina Garciakathleen DUNNE managed the patient along with the ED Attending      Electronically Signed by         Lester Meza MD  02/07/18 0071

## 2018-02-07 NOTE — OCCUPATIONAL THERAPY NOTE
[address]  Evaluation     Patient Name: Toni Bell  [id number] Date: 2/7/2018  Problem List  Patient Active Problem List   Diagnosis    CKD (chronic kidney disease) stage 4, GFR 15-29 ml/min (HCC)    Chronic saddle pulmonary embolism (HCC)    Chronic kidney disease (CKD), stage IV (severe) ([facility] [facility] )    Bladder mass     Past Medical History  Past Medical History:   Diagnosis Date    Anxiety     Chronic kidney disease [facility]CKD), stage IV (severe) ([facility])     stage IV    Chronic thrombosis of subclavian vein ([facility] [address] )     right    Hydronephrosis     Hypertension     Hypothyroid     Incontinence     Lung mass     Improving on PET/CT 1/2016    Polycythemia vera ([facility] [facility] )     Pulmonary embolism ([facility] ) 2014    Urinary tract infection      Past Surgical History  Past Surgical History:   Procedure Laterality Date    BLADDER SUSPENSION      BOTOX INJECTION N/A 7/27/2016    Procedure: BOTOX INJECTION ;  Surgeon: Susan Rosario MD;  [facility];  Service:     CHOLECYSTECTOMY N/A     COLONOSCOPY      CYSTECTOMY, RADICAL WITH ILEOCONDUIT N/A 10/4/2016    Procedure: Reed Duvall ;  Surgeon: Susan Rosario MD;  [facility];  Service:    Jacque Grandchild W/ RETROGRADES Bilateral 7/27/2016    Procedure: Luisito Chavez; RETROGRADE PYELOGRAM ;  Surgeon: Susan Rosario MD;  [facility];  Service:     AZ COLONOSCOPY FLX DX W/COLLJ SPEC WHEN PFRMD N/A 8/31/2016    Procedure: COLONOSCOPY;  Surgeon: Sixto Ybarra MD;  Location: [facility];   Service: Gastroenterology    AZ CYSTOSCOPY,INSERT URETERAL STENT Bilateral 7/27/2016    Procedure: STENT INSERTION; EXCISION OF MESH ;  Surgeon: Susan Rosario MD;  [facility];  Service: Urology    TONSILLECTOMY      TUBAL LIGATION      WISDOM TOOTH EXTRACTION        02/07/18 1300   Note Type   Note type Eval/Treat   Restrictions/Precautions   Weight Bearing Precautions Per Order No   Other Precautions Fall Risk;Multiple lines  (NG TUBE, NPO)   Pain Assessment   Pain Assessment No/denies pain   Pain Score No Pain   Home Living   Type of Home House   Home Layout Multi-level;Stairs to enter with rails   Bathroom Shower/Tub Walk-in shower   Bathroom Toilet Standard   Bathroom Equipment Grab bars in shower;Built-in shower seat; Shower chair   [address] [place]   Additional Comments No bathroom on first floor; accessible 2nd floor bathroom   Prior Function   Level of [place] Independent with ADLs and functional mobility   Lives With Son  (48 y/o son; dtr is staying with pt )   ADL Assistance Independent   IADLs Independent   Falls in the last 6 months 1 to 4   Vocational Retired   Comments Pt is recently ; dtr is staying with pt for an indefinite period of time  Lifestyle   Autonomy Fully indep PTA   Reciprocal Relationships lives with son   Service to Others retired   Intrinsic Gratification staying active, going to [address]) X   Patient Behaviors/Mood Cooperative; Anxious; Flat affect; Appropriate for situation   Subjective   Subjective "I am not willing to go anywhere else, Im going home from here"   ADL   Where [address] Other (Comment)   Eating Deficit NPO   Grooming Assistance 5  Supervision/Setup   UB [address] 5  Supervision/Setup   LB Bathing Assistance 5  Supervision/Setup   UB Dressing Assistance 5  Supervision/Setup    [facility] [address]  [address]  5  Supervision/Setup   Additional Comments Pt emptied urostomy bag in stance with b/l UE release and no LOB noted   Bed Mobility   Rolling R 6  Modified independent   Rolling L 6  Modified independent   Transfers   Sit to Stand 5  Supervision   Stand to Sit 5  Supervision   Stand pivot 5  Supervision   Balance   Static Sitting Normal   Dynamic Sitting Good   Static Standing Fair +   Dynamic Standing Fair   Ambulatory Fair Activity Tolerance   Activity Tolerance Patient limited by fatigue;Treatment limited secondary to medical complications (Comment)  (multiple lines; elevated BP per nsg)   Medical Staff Made Aware JOHNATHAN LORENZO Assessment   RUE Assessment WFL   LUE Assessment   LUE Assessment WFL   Cognition   Overall Cognitive Status WFL   Assessment   Limitation Decreased ADL status; Decreased endurance;Decreased UE strength   Prognosis Good   Assessment Pt is a 71 y o  female seen for OT evaluation s/p admit to [address] [place] [address] on 2/6/2018 w/ small-bowel obstruction, ileus, GI tract neoplasia, gastritis, far less likely pancreatitis, far less likely mesenteric ischemia per MD  Comorbidities affecting pt's functional performance at time of assessment include: h/o chronic kidney disease, chronic pulmonary embolism (anticoagulated), polycythemia vera, HTN, hypothyroidism, and urinary incontinence  Personal factors affecting pt at time of IE include:steps to enter environment, limited home support, difficulty performing ADLS, flat affect, decreased initiation and engagement , health management  and environment  Prior to admission, pt was fully indep, no h/o of use of AD  Upon evaluation: Pt requires CS/ CG  2* the following deficits impacting occupational performance: weakness, decreased strength, decreased balance and decreased tolerance  Pt to benefit from continued skilled OT tx while in the hospital to address deficits as defined above and maximize level of functional independence w ADL's and functional mobility  Occupational Performance areas to address include: bathing/shower, toilet hygiene, dressing, socialization, health maintenance, functional mobility, community mobility, clothing management, cleaning, meal prep and household maintenance  From OT standpoint, recommendation at time of d/c would be home with home OT services vs family support pending pt progress  Pt with recent loss of her  (<1mo) ago   Sensitive with tx  Goals   Patient Goals to go home   Plan   Treatment Interventions ADL retraining;Functional transfer training; Endurance training;UE strengthening/ROM; Patient/family training;Equipment evaluation/education; Energy conservation; Activityengagement   Goal Expiration Date 02/17/18   OT Frequency 3-5x/wk   Additional Treatment Session   Start Time 1250   End Time 1305   Treatment Assessment Pt participated in urostomy management in stance at sink; ot edu pt on use of UE support in stance  Pt able to release b/l UEs in stance with no LOB noted   Recommendation   OT Discharge Recommendation Home OT   Barthel Index   Feeding 10   Bathing 5   Grooming Score 5   Dressing Score 5   Bladder Score 0   Bowels Score 10   Toilet Use Score 5   Transfers (Bed/Chair) Score 10   Mobility (Level Surface) Score 10   Stairs Score 0   Barthel Index Score 60   Modified [place] Scale   Modified [place] Scale 3      1  PT WILL COMPLETE UB/LB DRESSING WHILE SEATED/STANDING WITH MOD INDEPENDENCE     2  PT WILL COMPLETE TOILETING WITH MOD INDEPENDENCE, WITH THOROUGH HYGIENE, USING DME PRN     3  PT WILL COMPLETE BED MOBILITY AND TRANSFER TO EOB WITH MOD INDEPENDENCE, TO INCREASE FUNCTIONAL MOBILITY FOR PARTICIPATION IN ADLS/IADLS    4  PT WILL COMPLETE FUNCTIONAL TRANSFERS ON/OFF ALL SURFACES, INCLUDING TOILET, WITH MOD INDEPENDENCE AND DME PRN TO INCREASE PARTICIPATION IN ADLS/IADLS     5  PT WILL COMPLETE LIGHT GROOMING TASK WHILE STANDING AT SINK FOR 3-5 MINUTES, WITH MOD INDEPENDENCE AND GOOD BALANCE, DME PRN     6  PT WILL INCREASE ACTIVITY TOLERANCE TO 15-20 MINUTES DURING OT TX SESSION TO INCREASE PARTICIPATION IN ADLS/IADLS

## 2018-02-07 NOTE — PLAN OF CARE
Problem: OCCUPATIONAL THERAPY ADULT  Goal: Performs self-care activities at highest level of function for planned discharge setting  See evaluation for individualized goals  Treatment Interventions: ADL retraining, Functional transfer training, Endurance training, UE strengthening/ROM, Patient/family training, Equipment evaluation/education, Energy conservation, Activityengagement          See flowsheet documentation for full assessment, interventions and recommendations  Limitation: Decreased ADL status, Decreased endurance, Decreased UE strength  Prognosis: Good  Assessment: Pt is a 71 y o  female seen for OT evaluation s/p admit to [facility] on [facility]/6/2018 w/ small-bowel obstruction, ileus, GI tract neoplasia, gastritis, far less likely pancreatitis, far less likely mesenteric ischemia per MD  Comorbidities affecting pt's functional performance at time of assessment include: h/o chronic kidney disease, chronic pulmonary embolism (anticoagulated), polycythemia vera, HTN, hypothyroidism, and urinary incontinence  Personal factors affecting pt at time of IE include:steps to enter environment, limited home support, difficulty performing ADLS, flat affect, decreased initiation and engagement , health management  and environment  Prior to admission, pt was fully indep, no h/o of use of AD  Upon evaluation: Pt requires CS/ CG  2* the following deficits impacting occupational performance: weakness, decreased strength, decreased balance and decreased tolerance  Pt to benefit from continued skilled OT tx while in the hospital to address deficits as defined above and maximize level of functional independence w ADL's and functional mobility  Occupational Performance areas to address include: bathing/shower, toilet hygiene, dressing, socialization, health maintenance, functional mobility, community mobility, clothing management, cleaning, meal prep and household maintenance   From OT standpoint, recommendation at time of d/c would be home with home OT services vs family support pending pt progress  Pt with recent loss of her  (<1mo) ago   Sensitive with tx         OT Discharge Recommendation: Home OT

## 2018-02-07 NOTE — ED RE-EVALUATION NOTE
Bedside ultrasound demonstrates multiple dilated bowel loops with diameter up to 3 5cm  Concern for SBO vs ileus  Will a/w CT scan       Marta Grewal DO  02/07/18 0006

## 2018-02-07 NOTE — PROGRESS NOTES
Spoke with [facility] resident thaddeus now regarding her BP elevating again  New orders to follow

## 2018-02-07 NOTE — ED NOTES
Spoke with [facility]  They will profile daily meds to be given when NG tube is removed       Bia Kaufman RN  02/07/18 2090

## 2018-02-07 NOTE — PROGRESS NOTES
ptt 174  Held for 1 hour then decreased by 3units/kg/hr  Restart at 1530 at a rate of 15units/kg/hr  PTT due at 2130

## 2018-02-07 NOTE — CASE MANAGEMENT
Thank you,  [facility] in the [facility] by Venu Delgado for 2017  Network Utilization Review Department  Phone: [phone]; Fax [phone]  ATTENTION: The Network Utilization Review Department is now centralized for our 7 Facilities  Make a note that we have a new phone and fax numbers for our Department  Please call with any questions or concerns to [phone] and carefully follow the prompts so that you are directed to the right person  All voicemails are confidential  Fax any determinations, approvals, denials, and requests for initial or continue stay review clinical to [phone]  Due to HIGH CALL volume, it would be easier if you could please send faxed requests to expedite your requests and in part, help us provide discharge notifications faster     =================================================================    Initial Clinical Review    Admission: Date/Time/Statement: 2/7/18 @ 0235  Orders Placed This Encounter   Procedures    Inpatient Admission     Standing Status:   Standing     Number of Occurrences:   1     Order Specific Question:   Admitting Physician     Answer:   Jada Braden [[id number]]     Order Specific Question:   Level of Care     Answer:   Med Surg [16]     Order Specific Question:   Estimated length of stay     Answer:   More than 2 Midnights     Order Specific Question:   Certification     Answer:   I certify that inpatient services are medically necessary for this patient for a duration of greater than two midnights  See H&P and MD Progress Notes for additional information about the patient's course of treatment           ED: Date/Time/Mode of Arrival:   ED Arrival Information     Expected Arrival Acuity Means of Arrival Escorted By Service Admission Type    - 2/6/2018 23:02 Emergent Walk-In Self Surgery-General Emergency    Arrival Complaint    Vomiting/Body aches          Chief Complaint:   Chief Complaint   Patient presents with    Abdominal Pain     abdominal pain, vomited x4, no diarrhea, no SOB, feels "faint", stage 4 kidney failure, not on dialysis       History of Illness:   Andrea Kong is a 71 y o  female who presents with abdominal pain history yesterday  She describes it as a diffuse crampy pain throughout her abdomen that is constant  Associated symptoms include nausea/vomiting, subjective fever/chills, and myalgias  She denies chest pain/shortness of breath, blood in her stool, any change in her ileal conduit output  States that her last bowel movement was sometime yesterday  She has never had a bowel obstruction before  Past surgical history is significant for supra trigonal cystectomy with ileal conduit construction in 2016 with Dr Lynda Hester  She had a prolonged postoperative course including ileus and C diff colitis  Cystectomy was for CKD stage 4/5 secondary to obstructive uropathy from urinary incontinence causing bilateral hydronephrosis refractory to medical management/Botox injections/percutaneous nephrostomy tubes  Other surgical history includes cholecystectomy and IVC filter placement/removal   CT scan shows small-bowel obstruction with transition point in the mid left abdomen      ED Vital Signs:   ED Triage Vitals [02/06/18 2313]   Temperature Pulse Respirations Blood Pressure SpO2   97 5 °F (36 4 °C) 72 18 (!) 194/122 99 %      Temp Source Heart Rate Source Patient Position - Orthostatic VS BP Location FiO2 (%)   Tympanic Monitor Sitting Left arm --      Pain Score       7        Wt Readings from Last 1 Encounters:   02/07/18 81 6 kg (179 lb 14 3 oz)     Abnormal Labs/Diagnostic Test Results:    WBC 8 23 02/06/2018     HGB 12 3 02/06/2018     HCT 36 4 02/06/2018     MCV 89 02/06/2018      02/06/2018       02/06/2018     K 4 5 02/06/2018      (H) 02/06/2018     CO2 23 02/06/2018     ANIONGAP 10 02/06/2018     BUN 53 (H) 02/06/2018     CREATININE 2 75 (H) 02/06/2018     GLUCOSE 141 (H) 02/06/2018     CALCIUM 9 5 02/06/2018     AST 23 02/06/2018     ALT 16 02/06/2018     ALKPHOS 111 02/06/2018     PROT 7 8 02/06/2018     BILITOT 0 39 02/06/2018     EGFR 17 02/06/2018      CT Abd/pel: Fluid and gas-filled distended small bowel loops with transition point in the left mid abdomen compatible with small bowel obstruction  No free air  Distended stomach  Consider nasogastric decompression      ED Treatment:   Medication Administration from 02/06/2018 2302 to 02/07/2018 0957       Date/Time Order Dose Route Action Action by Comments     02/07/2018 0122 sodium chloride 0 9 % bolus 500 mL 0 mL Intravenous Stopped Cecily Hicks RN      02/07/2018 0022 sodium chloride 0 9 % bolus 500 mL 500 mL Intravenous [address] Wanda Morton RN      02/07/2018 0048 morphine (PF) 4 mg/mL injection 4 mg 4 mg Intravenous Given Wanda Morton RN      02/07/2018 0048 morphine injection 2 mg 2 mg Intravenous Given Wanda Morton RN      02/07/2018 0056 ondansetron (ZOFRAN) injection 4 mg 4 mg Intravenous Given Wanda Morton RN      02/07/2018 0159 methylPREDNISolone sodium succinate (Solu-MEDROL) injection 125 mg 125 mg Intravenous Given Cecily Hicks RN      02/07/2018 0200 midazolam (VERSED) injection 1 mg 1 mg Intravenous Given Cecily Hicks RN      02/07/2018 0158 benzocaine (HURRICAINE) 20 % mucosal spray 2 spray 2 spray Mucosal Given Cecily Hicks RN      02/07/2018 0442 sodium chloride 0 9 % infusion 100 mL/hr Intravenous [address] Aric Mclean RN      02/07/2018 9905 pantoprazole (PROTONIX) injection 40 mg 40 mg Intravenous Given Kaitlynn Doyle RN      02/07/2018 0438 phenol (CHLORASEPTIC) 1 4 % mucosal liquid 1 spray 1 spray Mouth/Throat Given Aric Mclean RN      02/07/2018 0505 heparin (porcine) 25,000 units in 250 mL infusion (premix) 18 Units/kg/hr Intravenous New Bag Aric Mclean RN dual sign off with deon corado rn & benito chew rn          Past Medical/Surgical History: Active Ambulatory Problems     Diagnosis Date Noted    CKD (chronic kidney disease) stage 4, GFR 15-29 ml/min (HCC) 10/07/2016    Chronic saddle pulmonary embolism (HCC) 10/07/2016    Chronic kidney disease (CKD), stage IV (severe) (HCC)     Bladder mass 10/07/2016     Past Medical History:   Diagnosis Date    Anxiety     Chronic kidney disease (CKD), stage IV (severe) (HCC)     Chronic thrombosis of subclavian vein (HCC)     Hydronephrosis     Hypertension     Hypothyroid     Incontinence     Lung mass     [facility]cy[facility] vera [facility] [address] )     Pulmonary embolism ([facility] [address] ) 2014    Urinary tract infection        Admitting Diagnosis: Vomiting [R11 10]  Small bowel obstruction [K56 609]  Nausea and vomiting [R11 2]  Chronic kidney disease (CKD), stage IV (severe) (HCC) [N18 4]  Diffuse abdominal pain [R10 84]    Age/Sex: 71 y o  female    Assessment/Plan:   80F with h/o super trigonal cystectomy with ileal conduit in 2016, now presents with abdominal pain, distension, and nausea/vomiting    Seen on CT scan to have a small-bowel obstruction with transition point in the left mid abdomen      Plan:  - admit surgery  - NPO/NGT  - IV fluids  - hold Eliquis, start heparin drip  - p r n  pain control  - out of bed/ambulate  - PT/OT  - SCDs    Admission Orders:  Scheduled Meds:   Current Facility-Administered Medications:  heparin (porcine) 3-30 Units/kg/hr (Order-Specific) Intravenous Titrated Jesus Lockhart MD Last Rate: 18 Units/kg/hr (02/07/18 0205)   HYDROmorphone 0 2 mg Intravenous Q3H PRN Jesus Lockhart MD    HYDROmorphone 0 5 mg Intravenous Q3H PRN Jesus Lockhart MD    metoprolol 5 mg Intravenous Q6H PRN Jesus Lockhart MD    ondansetron 4 mg Intravenous Q6H PRN Jesus Lockhart MD    pantoprazole 40 mg Intravenous Q24H Jazz Marcial MD    phenol 1 spray Mouth/Throat Q2H PRN Jesus Lockhart MD    sodium chloride 100 mL/hr Intravenous Continuous Jesus Lockhart MD Last Rate: 100 mL/hr (02/07/18 6963) Continuous Infusions:   heparin (porcine) 3-30 Units/kg/hr (Order-Specific) Last Rate: 18 Units/kg/hr (02/07/18 0797)   sodium chloride 100 mL/hr Last Rate: 100 mL/hr (02/07/18 8931)     PRN Meds: HYDROmorphone    HYDROmorphone    metoprolol    ondansetron    phenol    NPO  NG Tube  Consult PT/OT  Abilio SCDs

## 2018-02-07 NOTE — PLAN OF CARE
Problem: PHYSICAL THERAPY ADULT  Goal: Performs mobility at highest level of function for planned discharge setting  See evaluation for individualized goals  Treatment/Interventions: Functional transfer training, Bed mobility, Gait training, Equipment eval/education, Spoke to nursing  Equipment Recommended: Harry Nieto (will continue to assess need for AD in PT)       See flowsheet documentation for full assessment, interventions and recommendations  Prognosis: Excellent  Problem List: Decreased strength, Decreased endurance, Impaired balance, Decreased mobility  Assessment: pt is a 71year old female with Hx of trigonal cystectomy with ileal conduit in 2016, chronic saddle pulmonary embolism, CKD, bladder mass, who is admitted due to small bowel obstruction  Pt currently has IV & NG tube which was disconnected by RN during therapy and was reconnected after session  Pt presents with dec standing balance and overall endurance requiring min-CG to complete functional mobilities using a RW  Pt tolerated PT session with no complaints of pain or dizziness nor demonstrated any LOB during functional activities  Pt will benefit from skilled PT services while in acute care to continue to assess need for AD and progress functional mobility indep to facilitate a safe home d/c  Pt was put on the recliner per her preference with pumps on and call bell/phone/table within reach at end of tx session  Barriers to Discharge: Inaccessible home environment, Decreased caregiver support  Barriers to Discharge Comments: 6 MARLON with L HR, FF with bilat HR to access bathroom/bedroom, no bathroom on first floor  limited local family support  Recommendation: Home independently, Home PT     PT - OK to Discharge: No    See flowsheet documentation for full assessment

## 2018-02-07 NOTE — ED ATTENDING ATTESTATION
Cristofer Grewal DO, saw and evaluated the patient  I have discussed the patient with the resident/non-physician practitioner and agree with the resident's/non-physician practitioner's findings, Plan of Care, and MDM as documented in the resident's/non-physician practitioner's note, except where noted  All available labs and Radiology studies were reviewed  At this point I agree with the current assessment done in the Emergency Department  I have conducted an independent evaluation of this patient a history and physical is as follows:    70 yo female presents for evaluation of acute onset diffuse abd pain, constant in nature, waxes and wanes  Cramping in nature  Started at 1600h today, associated with 4 episodes of NBNB emesis  Tried to drink ginger ale but vomited that up  Denies having a BM today  Denies f/c, CP/SOB, urinary sxs, vag dc or bleeding  [address] sig for urostomy (2016), cholecystectomy      abd snd bs+, no TTP  No r/g  Urostomy site appears clean, no surrounding erythema or exudates  No fluctuance on exam     Imp: abd pain, vomiting  Concern for obstruction given prior surgeries and acute symptoms plan: abd labs, IVF, CT abd/pelvis  Pt has hx of stage IV CKD, so will do non contrast scan          Critical Care Time  CritCare Time    Procedures

## 2018-02-07 NOTE — ED NOTES
Page out to Gagan Francois to get patients daily meds ordered  Patient has a urostomy bag that needs emptied every couple of hours  PTT Due at 11:20  Jakafi medication will need to be brought in from home by daughter       Iraida Martinez RN  02/07/18 5846

## 2018-02-07 NOTE — H&P
H&P Exam - General Surgery   Bernarda Moody 71 y o  female MRN: [id number]  Unit/Bed#: ED 01 Encounter: [id number]    Assessment/Plan     Assessment:  72F with h/o super trigonal cystectomy with ileal conduit in 2016, now presents with abdominal pain, distension, and nausea/vomiting  Seen on CT scan to have a small-bowel obstruction with transition point in the left mid abdomen  Plan:  - admit surgery  - NPO/NGT  - IV fluids  - hold Eliquis, start heparin drip  - p r n  pain control  - out of bed/ambulate  - PT/OT  - SCDs      History of Present Illness     HPI:  Bernarda Moody is a 71 y o  female who presents with abdominal pain history yesterday  She describes it as a diffuse crampy pain throughout her abdomen that is constant  Associated symptoms include nausea/vomiting, subjective fever/chills, and myalgias  She denies chest pain/shortness of breath, blood in her stool, any change in her ileal conduit output  States that her last bowel movement was sometime yesterday  She has never had a bowel obstruction before  Past surgical history is significant for supra trigonal cystectomy with ileal conduit construction in 2016 with Dr Zeenat Monroe  She had a prolonged postoperative course including ileus and C diff colitis  Cystectomy was for CKD stage 4/5 secondary to obstructive uropathy from urinary incontinence causing bilateral hydronephrosis refractory to medical management/Botox injections/percutaneous nephrostomy tubes  Other surgical history includes cholecystectomy and IVC filter placement/removal   CT scan shows small-bowel obstruction with transition point in the mid left abdomen  Review of Systems   Constitutional: Positive for chills and fever  HENT: Negative  Eyes: Negative  Respiratory: Negative  Cardiovascular: Negative  Gastrointestinal: Positive for abdominal distention, abdominal pain, nausea and vomiting  Negative for blood in stool  Endocrine: Negative  Genitourinary: Negative  Musculoskeletal: Negative  Skin: Negative  Allergic/Immunologic: Negative  Neurological: Positive for weakness  Hematological: Negative  Psychiatric/Behavioral: Negative  Historical Information   Past Medical History:   Diagnosis Date    Anxiety     Chronic kidney disease (CKD), stage IV (severe) (HCC)     stage IV    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    Hydronephrosis     Hypertension     Hypothyroid     Incontinence     Lung mass     Improving on PET/CT 1/2016    [facility]a [facility] [address] [facility]     Pulmonary embolism ([facility] [address] ) 2014    Urinary tract infection      Past Surgical History:   Procedure Laterality Date    BLADDER SUSPENSION      BOTOX INJECTION N/A 7/27/2016    Procedure: BOTOX INJECTION ;  Surgeon: Benedicto Manriquez MD;  [facility];  Service:     CHOLECYSTECTOMY N/A     COLONOSCOPY      CYSTECTOMY, RADICAL WITH ILEOCONDUIT N/A 10/4/2016    Procedure: SUPRATRIGONAL CYSTECTOMY WITH ILEAL CONDUIT ;  Surgeon: Benedicto Manriquez MD;  [facility];  Service:    Anderson Sear W/ RETROGRADES Bilateral 7/27/2016    Procedure: Laurent Musty; RETROGRADE PYELOGRAM ;  Surgeon: Benedicto Manriquez MD;  [facility];  Service:     [place] COLONOSCOPY FLX DX W/COLLJ SPEC WHEN PFRMD N/A 8/31/2016    Procedure: COLONOSCOPY;  Surgeon: Mir Vallejo MD;  [facility]: [facility];   Service: Gastroenterology    VA CYSTOSCOPY,INSERT URETERAL STENT Bilateral 7/27/2016    Procedure: STENT INSERTION; EXCISION OF MESH ;  Surgeon: Benedicto Manriquez MD;  [facility];  Service: Urology    TONSILLECTOMY      TUBAL LIGATION      WISDOM TOOTH EXTRACTION       Social History   History   Alcohol Use    Yes     Comment: occasionally     History   Drug Use No     History   Smoking Status    Never Smoker   Smokeless Tobacco    Never Used     Family History: non-contributory    Meds/Allergies   all medications and allergies reviewed  No Known Allergies    Objective   First Vitals:   Blood Pressure: (!) 194/122 (02/06/18 2313)  Pulse: 72 (02/06/18 2313)  Temperature: 97 5 °F (36 4 °C) (02/06/18 2313)  Temp Source: Tympanic (02/06/18 2313)  Respirations: 18 (02/06/18 2313)  Weight - Scale: 81 6 kg (180 lb) (02/06/18 2313)  SpO2: 99 % (02/06/18 2313)    Current Vitals:   Blood Pressure: (!) 168/104 (02/07/18 0156)  Pulse: 97 (02/07/18 0156)  Temperature: 97 5 °F (36 4 °C) (02/06/18 2313)  Temp Source: Tympanic (02/06/18 2313)  Respirations: 22 (02/07/18 0156)  Weight - Scale: 81 6 kg (180 lb) (02/06/18 2313)  SpO2: 95 % (02/07/18 0156)      Intake/Output Summary (Last 24 hours) at 02/07/18 0214  Last data filed at 02/07/18 0122   Gross per 24 hour   Intake              500 ml   Output                0 ml   Net              500 ml       Invasive Devices     Peripheral Intravenous Line            Peripheral IV 02/06/18 Right Antecubital less than 1 day          Drain            Urostomy Ileal conduit  days    NG/OG/Enteral Tube Nasogastric 18 Fr Right nares less than 1 day                Physical Exam   Constitutional: She is oriented to person, place, and time  She appears well-developed and well-nourished  Eyes: EOM are normal  Pupils are equal, round, and reactive to light  No scleral icterus  Cardiovascular: Normal rate, regular rhythm and normal heart sounds  Pulmonary/Chest: Effort normal  No stridor  No respiratory distress  Abdominal: Soft  She exhibits distension (Tympanitic)  There is tenderness (Minimally tender periumbilically)  There is no rebound and no guarding  Ileal conduit present in bag on right side of abdomen, clear yellow urine present   Musculoskeletal: She exhibits no edema  Neurological: She is alert and oriented to person, place, and time  No cranial nerve deficit  Skin: Skin is warm and dry  Psychiatric: She has a normal mood and affect         Lab Results:   CBC:   Lab Results   Component Value Date    WBC 8 23 02/06/2018    HGB 12 3 02/06/2018    HCT 36 4 02/06/2018    MCV 89 02/06/2018     02/06/2018    MCH 30 1 02/06/2018    MCHC 33 8 02/06/2018    RDW 15 3 (H) 02/06/2018    MPV 10 1 02/06/2018    NRBC 0 02/06/2018   , CMP:   Lab Results   Component Value Date     02/06/2018    K 4 5 02/06/2018     (H) 02/06/2018    CO2 23 02/06/2018    ANIONGAP 10 02/06/2018    BUN 53 (H) 02/06/2018    CREATININE 2 75 (H) 02/06/2018    GLUCOSE 141 (H) 02/06/2018    CALCIUM 9 5 02/06/2018    AST 23 02/06/2018    ALT 16 02/06/2018    ALKPHOS 111 02/06/2018    PROT 7 8 02/06/2018    BILITOT 0 39 02/06/2018    EGFR 17 02/06/2018     Imaging: I have personally reviewed pertinent reports  EKG, Pathology, and Other Studies: I have personally reviewed pertinent reports  Code Status: Prior  Advance Directive and Living Will:      Power of :    POLST:      Counseling / Coordination of Care  Total floor / unit time spent today 30 minutes  Greater than 50% of total time was spent with the patient and / or family counseling and / or coordination of care

## 2018-02-07 NOTE — SOCIAL WORK
CM met with the Pt at bedside to explain the CM role and discuss any potential D/C needs  Pt lives with her autistic son in a 2 story home with 6STE  PTA IADL's, uses no DME  Pt has hx of HHC with [facility]  No hx of IP rehab or MH diagnosis  Pt's home pharmacy is [facility] [address]  Pt's son is at home with Pt's daughter visiting from [facility]

## 2018-02-08 ENCOUNTER — APPOINTMENT (INPATIENT)
Dept: RADIOLOGY | Facility: HOSPITAL | Age: 73
DRG: 389 | End: 2018-02-08
Payer: COMMERCIAL

## 2018-02-08 LAB
APTT PPP: 155 SECONDS (ref 23–35)
APTT PPP: 77 SECONDS (ref 23–35)
APTT PPP: 85 SECONDS (ref 23–35)

## 2018-02-08 PROCEDURE — 97530 THERAPEUTIC ACTIVITIES: CPT

## 2018-02-08 PROCEDURE — 99232 SBSQ HOSP IP/OBS MODERATE 35: CPT | Performed by: SURGERY

## 2018-02-08 PROCEDURE — C9113 INJ PANTOPRAZOLE SODIUM, VIA: HCPCS | Performed by: STUDENT IN AN ORGANIZED HEALTH CARE EDUCATION/TRAINING PROGRAM

## 2018-02-08 PROCEDURE — 74022 RADEX COMPL AQT ABD SERIES: CPT

## 2018-02-08 PROCEDURE — 97116 GAIT TRAINING THERAPY: CPT

## 2018-02-08 PROCEDURE — 85730 THROMBOPLASTIN TIME PARTIAL: CPT | Performed by: SURGERY

## 2018-02-08 RX ORDER — DEXTROSE, SODIUM CHLORIDE, AND POTASSIUM CHLORIDE 5; .45; .15 G/100ML; G/100ML; G/100ML
50 INJECTION INTRAVENOUS CONTINUOUS
Status: DISCONTINUED | OUTPATIENT
Start: 2018-02-08 | End: 2018-02-14

## 2018-02-08 RX ADMIN — SODIUM CHLORIDE 100 ML/HR: 0.9 INJECTION, SOLUTION INTRAVENOUS at 00:29

## 2018-02-08 RX ADMIN — ONDANSETRON 4 MG: 2 INJECTION INTRAMUSCULAR; INTRAVENOUS at 18:09

## 2018-02-08 RX ADMIN — Medication 1 SPRAY: at 18:09

## 2018-02-08 RX ADMIN — DEXTROSE, SODIUM CHLORIDE, AND POTASSIUM CHLORIDE 100 ML/HR: 5; .45; .15 INJECTION INTRAVENOUS at 07:52

## 2018-02-08 RX ADMIN — PANTOPRAZOLE SODIUM 40 MG: 40 INJECTION, POWDER, FOR SOLUTION INTRAVENOUS at 08:00

## 2018-02-08 RX ADMIN — ONDANSETRON 4 MG: 2 INJECTION INTRAMUSCULAR; INTRAVENOUS at 08:33

## 2018-02-08 NOTE — PROGRESS NOTES
Progress Note - General Surgery   Pam Guzman 71 y o  female MRN: [id number]  Unit/Bed#: -01 Encounter: [id number]    Assessment:  71 y o  F w/ SBO likely 2/2 adhesions    Still no bowel function  Low NGT output, however persistently distended    Plan:  NPO/NGT  IVF  Replete lytes  Obstrution series today  Strict I/O  DVT ppx    Subjective/Objective     Subjective: No acute events, persistently distended, no flatus or BM    Objective:     Blood pressure 163/88, pulse 82, temperature 98 4 °F (36 9 °C), temperature source Oral, resp  rate 18, height 5' (1 524 m), weight 81 6 kg (179 lb 14 3 oz), SpO2 97 %  ,Body mass index is 35 13 kg/m²  Intake/Output Summary (Last 24 hours) at 02/08/18 3595  Last data filed at 02/08/18 0800   Gross per 24 hour   Intake          1091 26 ml   Output             1440 ml   Net          -348 74 ml       Invasive Devices     Peripheral Intravenous Line            Peripheral IV 02/06/18 Right Antecubital 1 day    Peripheral IV 02/07/18 Right Hand 1 day          Drain            Urostomy Ileal conduit  days    NG/OG/Enteral Tube Nasogastric 18 Fr Right nares 1 day                Physical Exam:   NAD  CV RRR  Pulm CTA  Abd distended, minimally tender, tympanitic, NGT w/ bilious output    Lab, Imaging and other studies:I have personally reviewed pertinent lab results    , CBC: No results found for: WBC, HGB, HCT, MCV, PLT, ADJUSTEDWBC, MCH, MCHC, RDW, MPV, NRBC, CMP: No results found for: NA, K, CL, CO2, ANIONGAP, BUN, CREATININE, GLUCOSE, CALCIUM, AST, ALT, ALKPHOS, PROT, ALBUMIN, BILITOT, EGFR  VTE Pharmacologic Prophylaxis: Heparin  VTE Mechanical Prophylaxis: sequential compression device

## 2018-02-08 NOTE — OCCUPATIONAL THERAPY NOTE
OccupationalTherapy Progress Note     Patient Name: Mauricio Beach  HBXQL'T Date: 2/8/2018  Problem List  Patient Active Problem List   Diagnosis    CKD (chronic kidney disease) stage 4, GFR 15-29 ml/min (HCC)    Chronic saddle pulmonary embolism (HCC)    Chronic kidney disease (CKD), stage IV (severe) (HCC)    Bladder mass           02/08/18 1350   Restrictions/Precautions   Weight Bearing Precautions Per Order No   Other Precautions Fall Risk  (IV, NG tube, urostomy)   Pain Assessment   Pain Assessment No/denies pain   Pain Score No Pain   Exercise Tools   Other Exercise Tool 1 Issued HEP 1 set 10 shoulder flexion/ext, horz  abd/add, shoulder abd/add  Pt  tolerated 1 set of 2 each exercise  Cognition   Overall Cognitive Status WFL   Arousal/Participation Alert; Cooperative   Attention Within functional limits   Orientation Level Oriented X4   Memory Decreased recall of precautions   Following Commands Follows one step commands without difficulty   Assessment   Assessment Upon entry to room pt  Seated in bedside chair  Pt  Declined therapy reporting she just got back from walk  Pt  Reports she know she has to engage in therapy for d/c home, but at the time did not feel up to it  However, pt was agreeable to issuing of HEP for UE ROM/strength and endurance training  Pt  Issued HEP, as stated above  Pt  Noted with flat affect  Discussed is she would like activities to engage in while in room  IE puzzle, word search  Pt  Quique House reporting she has a book  Pt  Would benefit from further OT services to increase endurance/activity tolerance, dyn standing balance to carryover to daily activities  Pt  May also benefit from education energy conservation, stress management, and leisure activities  To continue OT services per POC for safe d/c home at I/mod I level  Plan   Treatment Interventions ADL retraining;UE strengthening/ROM; Endurance training;Equipment evaluation/education;Patient/family training; Compensatory technique education; Energy conservation; Activityengagement   Goal Expiration Date 02/17/18   OT Frequency 3-5x/wk   Recommendation   OT Discharge Recommendation Home OT

## 2018-02-08 NOTE — PLAN OF CARE
Problem: PHYSICAL THERAPY ADULT  Goal: Performs mobility at highest level of function for planned discharge setting  See evaluation for individualized goals  Treatment/Interventions: Functional transfer training, Endurance training, Gait training, Spoke to nursing, Spoke to case management  Equipment Recommended: Merlin Sovereign (will continue to assess need for AD in PT)       See flowsheet documentation for full assessment, interventions and recommendations  Outcome: Progressing  Prognosis: Excellent  Problem List: Decreased strength, Decreased endurance, Impaired balance, Decreased mobility  Assessment: pt participated with chair transfer training , gait and stair training but refused therapeutic exercises due to fatigue " maybe tomorrow"  Pt required min A during initial sit to stand transfer as she lost her balance after she stood up suddenly when recliner was still unlocked and staff were managing multiple lines in place  However she completed subsequent transfers at CS level although required cueing on proper hand placement for safety  Pt increased amb distance to 250' at best using a RW at CS level as well  However stair act tolerance limited to 8 steps due to fatigue using R HR at CS level   PT will continue to work on improving pt' strength, endurance and functional mobility indep with least restrictive AD to facilitate a safe home d/c    Barriers to Discharge: Inaccessible home environment, Decreased caregiver support  Barriers to Discharge Comments: 6 MARLON with L HR, FF with bilat HR to access bathroom/bedroom, no bathroom on first floor  limited local family support  Recommendation: Home independently, Home PT     PT - OK to Discharge: No    See flowsheet documentation for full assessment

## 2018-02-08 NOTE — PHYSICAL THERAPY NOTE
Physical Therapy Treat  Patient Name: Amos Fernandez    MSRGQ'Q Date: 2/8/2018     Problem List  Patient Active Problem List   Diagnosis    CKD (chronic kidney disease) stage 4, GFR 15-29 ml/min (HCC)    Chronic saddle pulmonary embolism (HCC)    [facility]CK[facility]) [facility] [address] [facility]    Bladder mass        Past Medical History  Past Medical History:   Diagnosis Date    Anxiety     [facility])     stage IV    Chronic thrombosis of subclavian vein ([facility] [address] )     right    Hydronephrosis     Hypertension     Hypothyroid     Incontinence     Lung mass     Improving on PET/CT 1/2016    Polycythemia vera ([facility] [address] )     Pulmonary embolism ([address] 2014    Urinary tract infection         Past Surgical History  Past Surgical History:   Procedure Laterality Date    BLADDER SUSPENSION      BOTOX INJECTION N/A 7/27/2016    Procedure: BOTOX INJECTION ;  Surgeon: Aidan Kapoor MD;  [facility];  Service:     CHOLECYSTECTOMY N/A     COLONOSCOPY      CYSTECTOMY, RADICAL WITH ILEOCONDUIT N/A 10/4/2016    Procedure: Mariola Reyna ;  Surgeon: Aidan Kapoor MD;  [facility];  Service:    Zenna Kit W/ RETROGRADES Bilateral 7/27/2016    Procedure: Juluis Door; RETROGRADE PYELOGRAM ;  Surgeon: Aidan Kapoor MD;  [facility];  Service:     [place] COLONOSCOPY FLX DX W/COLLJ SPEC WHEN PFRMD N/A 8/31/2016    Procedure: COLONOSCOPY;  Surgeon: Lucrecia Canela MD;  [facility];   Service: Gastroenterology    [place] CYSTOSCOPY,INSERT URETERAL STENT Bilateral 7/27/2016    Procedure: STENT INSERTION; EXCISION OF MESH ;  Surgeon: Aidan Kapoor MD;  [facility];  Service: Urology    TONSILLECTOMY      TUBAL LIGATION      WISDOM TOOTH EXTRACTION             02/08/18 0930   Pain Assessment   Pain Assessment No/denies pain   Pain Score No Pain Restrictions/Precautions   Weight Bearing Precautions Per Order No   Other Precautions Fall Risk;Multiple lines  (urostomy bag, IV pole, NG tube, NPO)   General   Chart Reviewed Yes   Family/Caregiver Present No   Cognition   Overall Cognitive Status WFL   Arousal/Participation Alert   Attention Within functional limits   Orientation Level Oriented X4   Memory Decreased recall of precautions   Following Commands Follows multistep commands with increased time or repetition   Subjective   Subjective pt was agreeable to have PT this AM and denied any c/o pain  Transfers   Sit to Stand 4  Minimal assistance   Additional items (min initially due to slight LOB to CS subsequent transfers)   Stand to Sit 5  Supervision   Stand pivot 5  Supervision  (with RW)   Ambulation/Elevation   Gait pattern Improper Weight shift; Inconsistent wisam   Gait Assistance 5  Supervision   Assistive Device Rolling walker   Distance 150  (250)   Stair Management Assistance 5  Supervision   Stair Management Technique One rail R;Alternating pattern   Number of Stairs 8   Activity Tolerance   Activity Tolerance Patient limited by fatigue   Assessment   Problem List Decreased strength;Decreased endurance; Impaired balance;Decreased mobility   Assessment pt participated with chair transfer training , gait and stair training but refused therapeutic exercises due to fatigue " maybe tomorrow"  Pt required min A during initial sit to stand transfer as she lost her balance after she stood up suddenly when recliner was still unlocked and staff were managing multiple lines in place  However she completed subsequent transfers at CS level although required cueing on proper hand placement for safety  Pt increased amb distance to 250' at best using a RW at CS level as well  However stair act tolerance limited to 8 steps due to fatigue using R HR at CS level    PT will continue to work on improving pt' strength, endurance and functional mobility indep with least restrictive AD to facilitate a safe home d/c     Barriers to Discharge Inaccessible home environment;Decreased caregiver support   Plan   Treatment/Interventions Functional transfer training; Endurance training;Gait training;Spoke to nursing;Spoke to case management   Progress Progressing toward goals   Recommendation   Recommendation Home independently;Home PT

## 2018-02-09 PROBLEM — K56.609 SMALL BOWEL OBSTRUCTION (HCC): Status: ACTIVE | Noted: 2018-02-09

## 2018-02-09 LAB
ANION GAP SERPL CALCULATED.3IONS-SCNC: 7 MMOL/L (ref 4–13)
APTT PPP: 83 SECONDS (ref 23–35)
BASOPHILS # BLD MANUAL: 0 THOUSAND/UL (ref 0–0.1)
BASOPHILS NFR MAR MANUAL: 0 % (ref 0–1)
BUN SERPL-MCNC: 31 MG/DL (ref 5–25)
CALCIUM SERPL-MCNC: 8.5 MG/DL (ref 8.3–10.1)
CHLORIDE SERPL-SCNC: 111 MMOL/L (ref 100–108)
CO2 SERPL-SCNC: 22 MMOL/L (ref 21–32)
CREAT SERPL-MCNC: 2.4 MG/DL (ref 0.6–1.3)
EOSINOPHIL # BLD MANUAL: 0.08 THOUSAND/UL (ref 0–0.4)
EOSINOPHIL NFR BLD MANUAL: 1 % (ref 0–6)
ERYTHROCYTE [DISTWIDTH] IN BLOOD BY AUTOMATED COUNT: 15.6 % (ref 11.6–15.1)
GFR SERPL CREATININE-BSD FRML MDRD: 20 ML/MIN/1.73SQ M
GLUCOSE SERPL-MCNC: 132 MG/DL (ref 65–140)
HCT VFR BLD AUTO: 34.9 % (ref 34.8–46.1)
HGB BLD-MCNC: 11.5 G/DL (ref 11.5–15.4)
LYMPHOCYTES # BLD AUTO: 0.38 THOUSAND/UL (ref 0.6–4.47)
LYMPHOCYTES # BLD AUTO: 5 % (ref 14–44)
MAGNESIUM SERPL-MCNC: 2.3 MG/DL (ref 1.6–2.6)
MCH RBC QN AUTO: 30 PG (ref 26.8–34.3)
MCHC RBC AUTO-ENTMCNC: 33 G/DL (ref 31.4–37.4)
MCV RBC AUTO: 91 FL (ref 82–98)
MONOCYTES # BLD AUTO: 0.23 THOUSAND/UL (ref 0–1.22)
MONOCYTES NFR BLD: 3 % (ref 4–12)
NEUTROPHILS # BLD MANUAL: 6.98 THOUSAND/UL (ref 1.85–7.62)
NEUTS SEG NFR BLD AUTO: 91 % (ref 43–75)
NRBC BLD AUTO-RTO: 0 /100 WBCS
PHOSPHATE SERPL-MCNC: 2.9 MG/DL (ref 2.3–4.1)
PLATELET # BLD AUTO: 275 THOUSANDS/UL (ref 149–390)
PLATELET BLD QL SMEAR: ADEQUATE
PMV BLD AUTO: 9.9 FL (ref 8.9–12.7)
POTASSIUM SERPL-SCNC: 4.7 MMOL/L (ref 3.5–5.3)
RBC # BLD AUTO: 3.83 MILLION/UL (ref 3.81–5.12)
RBC MORPH BLD: NORMAL
SODIUM SERPL-SCNC: 140 MMOL/L (ref 136–145)
WBC # BLD AUTO: 7.67 THOUSAND/UL (ref 4.31–10.16)

## 2018-02-09 PROCEDURE — 80048 BASIC METABOLIC PNL TOTAL CA: CPT | Performed by: STUDENT IN AN ORGANIZED HEALTH CARE EDUCATION/TRAINING PROGRAM

## 2018-02-09 PROCEDURE — C9113 INJ PANTOPRAZOLE SODIUM, VIA: HCPCS | Performed by: STUDENT IN AN ORGANIZED HEALTH CARE EDUCATION/TRAINING PROGRAM

## 2018-02-09 PROCEDURE — 85027 COMPLETE CBC AUTOMATED: CPT | Performed by: STUDENT IN AN ORGANIZED HEALTH CARE EDUCATION/TRAINING PROGRAM

## 2018-02-09 PROCEDURE — 97530 THERAPEUTIC ACTIVITIES: CPT

## 2018-02-09 PROCEDURE — 85730 THROMBOPLASTIN TIME PARTIAL: CPT | Performed by: SURGERY

## 2018-02-09 PROCEDURE — 83735 ASSAY OF MAGNESIUM: CPT | Performed by: STUDENT IN AN ORGANIZED HEALTH CARE EDUCATION/TRAINING PROGRAM

## 2018-02-09 PROCEDURE — 97110 THERAPEUTIC EXERCISES: CPT

## 2018-02-09 PROCEDURE — 99232 SBSQ HOSP IP/OBS MODERATE 35: CPT | Performed by: SURGERY

## 2018-02-09 PROCEDURE — 85007 BL SMEAR W/DIFF WBC COUNT: CPT | Performed by: STUDENT IN AN ORGANIZED HEALTH CARE EDUCATION/TRAINING PROGRAM

## 2018-02-09 PROCEDURE — 84100 ASSAY OF PHOSPHORUS: CPT | Performed by: STUDENT IN AN ORGANIZED HEALTH CARE EDUCATION/TRAINING PROGRAM

## 2018-02-09 PROCEDURE — 97116 GAIT TRAINING THERAPY: CPT

## 2018-02-09 RX ADMIN — ONDANSETRON 4 MG: 2 INJECTION INTRAMUSCULAR; INTRAVENOUS at 12:48

## 2018-02-09 RX ADMIN — DEXTROSE, SODIUM CHLORIDE, AND POTASSIUM CHLORIDE 100 ML/HR: 5; .45; .15 INJECTION INTRAVENOUS at 15:44

## 2018-02-09 RX ADMIN — HYDRALAZINE HYDROCHLORIDE 10 MG: 20 INJECTION INTRAMUSCULAR; INTRAVENOUS at 15:38

## 2018-02-09 RX ADMIN — DEXTROSE, SODIUM CHLORIDE, AND POTASSIUM CHLORIDE 100 ML/HR: 5; .45; .15 INJECTION INTRAVENOUS at 04:20

## 2018-02-09 RX ADMIN — LABETALOL 20 MG/4 ML (5 MG/ML) INTRAVENOUS SYRINGE 10 MG: at 21:45

## 2018-02-09 RX ADMIN — PANTOPRAZOLE SODIUM 40 MG: 40 INJECTION, POWDER, FOR SOLUTION INTRAVENOUS at 09:53

## 2018-02-09 RX ADMIN — Medication 1 SPRAY: at 20:46

## 2018-02-09 RX ADMIN — HEPARIN SODIUM 10 UNITS/KG/HR: 10000 INJECTION, SOLUTION INTRAVENOUS at 07:30

## 2018-02-09 NOTE — CASE MANAGEMENT
[address] [place] [address] in the [facility] by Venu Delgado for 2017  Network Utilization Review Department  Phone: [phone]; Fax [phone]  ATTENTION: The Network Utilization Review Department is now centralized for our 7 Facilities  Please call with any questions or concerns to [phone] and carefully follow the prompts so that you are directed to the right person  All voicemails are confidential  Fax any determinations, approvals, denials, and requests for initial or continue stay review clinical to [phone]   Due to HIGH CALL volume, it would be easier if you could please send faxed requests to expedite your requests and in part, help us provide discharge notifications faster   /////////////////////////////////////////////////////////////////////////////////////////////////////////////////    Continued Stay Review    Date:  2/9/2018    Vital Signs: BP (!) 185/100 (BP Location: Right arm)   Pulse 96   Temp 98 1 °F (36 7 °C) (Oral)   Resp 20   Ht 5' (1 524 m)   Wt 81 6 kg (179 lb 14 3 oz)   SpO2 96%   BMI 35 13 kg/m²     Medications:   Scheduled Meds:   Current Facility-Administered Medications:  dextrose 5 % and sodium chloride 0 45 % with KCl 20 mEq/L 100 mL/hr Intravenous Continuous Ana Jarvis MD Last Rate: 100 mL/hr (02/09/18 9726)   heparin (porcine) 3-30 Units/kg/hr (Order-Specific) Intravenous Titrated Ana Jarvis MD Last Rate: 10 Units/kg/hr (02/09/18 0732)   hydrALAZINE 10 mg Intravenous Q6H PRN Eveline Howard MD    HYDROmorphone 0 2 mg Intravenous Q3H PRN Ana Jarvis MD    HYDROmorphone 0 5 mg Intravenous Q3H PRN Ana Jarvis MD    labetalol 10 mg Intravenous Q6H PRN Michael Panchal MD    ondansetron 4 mg Intravenous Q6H PRN Ana Jarvis MD    pantoprazole 40 mg Intravenous Q24H Funmilayo Coats MD    phenol 1 spray Mouth/Throat Q2H PRN Ana Jarvis MD      Continuous Infusions:   dextrose 5 % and sodium chloride 0 45 % with KCl 20 mEq/L 100 mL/hr Last Rate: 100 mL/hr (02/09/18 1544)   heparin (porcine) 3-30 Units/kg/hr (Order-Specific) Last Rate: 10 Units/kg/hr (02/09/18 0732)     Assessment/Plan:   We discussed the plan is to keep her NPO continue NG tube to suction for bowel decompression until return of normal bowel function    Continue IV fluids for hydration while NPO      We reviewed all of the invasive devices/lines/telemetry orders   - NG tube for bowel decompression to continue until return of normal bowel function      Pain Assessment / Plan:  - Continue current analgesic regimen as needed      Mobility Assessment / Plan:  - Out of bed and ambulation as tolerated; may clamp NG tube for ambulation   - PT and OT recommending return to home independently when able      Goals / Barriers for discharge:  - Awaiting return of bowel function   - Case management following      Discharge Plan: tbd

## 2018-02-09 NOTE — PHYSICAL THERAPY NOTE
Physical Therapy Treat    Patient Name: Lauren Mcarthur    HQZWS'Y Date: 2/9/2018     Problem List  Patient Active Problem List   Diagnosis    CKD (chronic kidney disease) stage 4, GFR 15-29 ml/min (HCC)    Chronic saddle pulmonary embolism (HCC)    Chronic kidney disease (CKD), stage IV (severe) ([facility] [address] )    Bladder mass    Small bowel obstruction        Past Medical History  Past Medical History:   Diagnosis Date    Anxiety     Chronic kidney disease (CKD), stage IV (severe) (HCC)     stage IV    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    Hydronephrosis     Hypertension     Hypothyroid     Incontinence     Lung mass     Improving on PET/CT 1/2016    Polycythemia vera ([facility] [address] )     Pulmonary embolism ([facility] [address] ) 2014    Urinary tract infection         Past Surgical History  Past Surgical History:   Procedure Laterality Date    BLADDER SUSPENSION      BOTOX INJECTION N/A 7/27/2016    Procedure: BOTOX INJECTION ;  Surgeon: Tessa Mcrae MD;  [facility];  Service:     CHOLECYSTECTOMY N/A     COLONOSCOPY      CYSTECTOMY, RADICAL WITH ILEOCONDUIT N/A 10/4/2016    Procedure: Meet Luther ;  Surgeon: Tessa Mcrae MD;  [facility];  Service:    Venda Flank W/ RETROGRADES Bilateral 7/27/2016    Procedure: Aureliano Valencia; RETROGRADE PYELOGRAM ;  Surgeon: Tessa Mcrae MD;  [facility];  Service:     FL COLONOSCOPY FLX DX W/COLLJ SPEC WHEN PFRMD N/A 8/31/2016    Procedure: COLONOSCOPY;  Surgeon: Linden Hermosillo MD;  [facility];   Service: Gastroenterology    FL CYSTOSCOPY,INSERT URETERAL STENT Bilateral 7/27/2016    Procedure: STENT INSERTION; EXCISION OF MESH ;  Surgeon: Tessa Mcrae MD;  [facility];  Service: Urology    TONSILLECTOMY      TUBAL LIGATION      WISDOM TOOTH EXTRACTION             02/09/18 0918   Pain Assessment   Pain Assessment No/denies pain   Pain Score No Pain   Restrictions/Precautions   Other Precautions Fall Risk;Multiple lines  (IV, NGT, urostomy)   General   Chart Reviewed Yes   Response to Previous Treatment Patient with no complaints from previous session  Family/Caregiver Present No   Cognition   Overall Cognitive Status WFL   Arousal/Participation Alert; Cooperative   Attention Within functional limits   Orientation Level Oriented X4   Memory Within functional limits   Following Commands Follows multistep commands with increased time or repetition   Subjective   Subjective pt c/o dryness on R nostril from NG Tube but was agreeable to have PT   Bed Mobility   Rolling L 6  Modified independent   Supine to Sit 5  Supervision   Transfers   Sit to Stand 5  Supervision  (CS)   Additional items Increased time required   Stand to Sit 5  Supervision  (CS)   Additional items Increased time required   Stand pivot 5  Supervision  (CS)   Additional items Increased time required   Ambulation/Elevation   Gait pattern Improper Weight shift; Inconsistent wisam   Gait Assistance 4  Minimal assist  (Cg-CS )   Assistive Device None   Distance 150  (250)   Stair Management Assistance 5  Supervision   Additional items Increased time required   Stair Management Technique Two rails; Alternating pattern   Number of Stairs 12   Activity Tolerance   Activity Tolerance Patient limited by fatigue   Nurse Made Aware RN Ting   Equipment Use   Comments bike power 1 x 4 mins then 6 mins with 1 rest break   Assessment   Problem List Decreased strength;Decreased endurance; Impaired balance;Decreased mobility   Assessment pt engaged in gait training, stair training, transfer training and therapeutic exercises    Pt agreeable to amb without using a RW today although at first would like to hook her arm on PT to walk but after education that PT will not be there when she goes home and she needs to learn to walk on her own to improve her balance, confidence and indep, she then walked on her own with PT providing CG-CS with slight path deviations but no LOB demonstrated  also increased number of steps to 12 using bilat HR requiring CS only  pt is progressing well and will benefit from continued skilled PT services while in acute care to facilitate recovery of functional mobility indep in order to go home safely  Barriers to Discharge Inaccessible home environment;Decreased caregiver support   Plan   Treatment/Interventions Functional transfer training; Therapeutic exercise; Endurance training;Gait training;Bed mobility;Spoke to nursing;OT   Progress Progressing toward goals   Recommendation   Recommendation Home independently;Home PT   Equipment Recommended Other (Comment)  (no AD)

## 2018-02-09 NOTE — PHYSICAL THERAPY NOTE
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    Hydronephrosis     Hypertension     Hypothyroid     Incontinence     Lung mass     Improving on PET/CT 1/2016    Polycythemia vera ([facility] [address] )     Pulmonary embolism ([facility] [address] ) 2014    Urinary tract infection         Past Surgical History  Past Surgical History:   Procedure Laterality Date    BLADDER SUSPENSION      BOTOX INJECTION N/A 7/27/2016    Procedure: BOTOX INJECTION ;  Surgeon: Felicitas Wheeler MD;  [facility];  Service:     CHOLECYSTECTOMY N/A     COLONOSCOPY      CYSTECTOMY, RADICAL WITH ILEOCONDUIT N/A 10/4/2016    Procedure: Druscilla Runner ;  Surgeon: Felicitas Wheeler MD;  [facility];  Service:    Lorayne Low W/ RETROGRADES Bilateral 7/27/2016    Procedure: Marco Schools; RETROGRADE PYELOGRAM ;  Surgeon: Felicitas Wheeler MD;  [facility];  Service:     NE COLONOSCOPY FLX DX W/COLLJ SPEC WHEN PFRMD N/A 8/31/2016    Procedure: COLONOSCOPY;  Surgeon: Eliane Scott MD;  [facility];   Service: Gastroenterology    NE CYSTOSCOPY,INSERT URETERAL STENT Bilateral 7/27/2016    Procedure: STENT INSERTION; EXCISION OF MESH ;  Surgeon: Felicitas Wheeler MD;  [facility];  Service: Urology    TONSILLECTOMY      TUBAL LIGATION      WISDOM TOOTH EXTRACTION             02/09/18 6668   Pain Assessment   Pain Assessment No/denies pain   Pain Score No Pain   Restrictions/Precautions   Weight Bearing Precautions Per Order No   Other Precautions Fall Risk;Multiple lines  (NPO, IV, NGT, urostomy)   General   Chart Reviewed Yes   Response to Previous Treatment Patient with no complaints from previous session  Family/Caregiver Present No   Cognition   Overall Cognitive Status WFL   Arousal/Participation Alert; Cooperative   Attention Within functional limits   Orientation Level Oriented X4   Memory Within functional limits   Following Commands Follows multistep commands with increased time or repetition   Subjective   Subjective pt had no complaints and agreeable to have another PT session this PM     Transfers   Sit to Stand 5  Supervision   Stand to Sit 5  Supervision   Stand pivot 5  Supervision   Additional items (no AD)   Ambulation/Elevation   Gait pattern Improper Weight shift; Inconsistent wisam   Gait Assistance 5  Supervision   Assistive Device None   Distance 350  (x 2 )   Activity Tolerance   Activity Tolerance Patient limited by fatigue   Nurse Made Aware RN Hayley   Assessment   Prognosis Excellent   Problem List Decreased strength;Decreased endurance; Impaired balance;Decreased mobility   Assessment PT session this PM focused on gait training balance and endurance  Pt demonstrates slight path deviation at times but no LOB  Pt increased amb distance to 350' x 2 reps without using an AD at S level  Pt will benefit from continued skilled PT services to facilitate recovery of functional mobility indep in order for pt to go home safely      Barriers to Discharge Inaccessible home environment;Decreased caregiver support   Plan   Treatment/Interventions Gait training;Functional transfer training   Progress Progressing toward goals   Recommendation   Recommendation Home independently;Home PT

## 2018-02-09 NOTE — PLAN OF CARE
Problem: PHYSICAL THERAPY ADULT  Goal: Performs mobility at highest level of function for planned discharge setting  See evaluation for individualized goals  Treatment/Interventions: Functional transfer training, Therapeutic exercise, Endurance training, Gait training, Bed mobility, Spoke to nursing, OT  Equipment Recommended: Other (Comment) (no AD)       See flowsheet documentation for full assessment, interventions and recommendations  Outcome: Progressing  Prognosis: Excellent  Problem List: Decreased strength, Decreased endurance, Impaired balance, Decreased mobility  Assessment: pt engaged in gait training, stair training, transfer training and therapeutic exercises  Pt agreeable to amb without using a RW today although at first would like to hook her arm on PT to walk but after education that PT will not be there when she goes home and she needs to learn to walk on her own to improve her balance, confidence and indep, she then walked on her own with PT providing CG-CS with slight path deviations but no LOB demonstrated  also increased number of steps to 12 using bilat HR requiring CS only  pt is progressing well and will benefit from continued skilled PT services while in acute care to facilitate recovery of functional mobility indep in order to go home safely  Barriers to Discharge: Inaccessible home environment, Decreased caregiver support  Barriers to Discharge Comments: 6 MARLON with L HR, FF with bilat HR to access bathroom/bedroom, no bathroom on first floor  limited local family support  Recommendation: Home independently, Home PT     PT - OK to Discharge: No    See flowsheet documentation for full assessment

## 2018-02-09 NOTE — PLAN OF CARE
Problem: PHYSICAL THERAPY ADULT  Goal: Performs mobility at highest level of function for planned discharge setting  See evaluation for individualized goals  Treatment/Interventions: Gait training, Functional transfer training  Equipment Recommended: Other (Comment) (no AD)       See flowsheet documentation for full assessment, interventions and recommendations  Outcome: Progressing  Prognosis: Excellent  Problem List: Decreased strength, Decreased endurance, Impaired balance, Decreased mobility  Assessment: PT session this PM focused on gait training balance and endurance  Pt demonstrates slight path deviation at times but no LOB  Pt increased amb distance to 350' x 2 reps without using an AD at S level  Pt will benefit from continued skilled PT services to facilitate recovery of functional mobility indep in order for pt to go home safely  Barriers to Discharge: Inaccessible home environment, Decreased caregiver support  Barriers to Discharge Comments: 6 MARLON with L HR, FF with bilat HR to access bathroom/bedroom, no bathroom on first floor  limited local family support  Recommendation: Home independently, Home PT     PT - OK to Discharge: No    See flowsheet documentation for full assessment

## 2018-02-09 NOTE — PROGRESS NOTES
Patient care rounds were completed with the patient's nurse today, Ting  We discussed the plan is to keep her NPO continue NG tube to suction for bowel decompression until return of normal bowel function  Continue IV fluids for hydration while NPO  We reviewed all of the invasive devices/lines/telemetry orders   - NG tube for bowel decompression to continue until return of normal bowel function  Pain Assessment / Plan:  - Continue current analgesic regimen as needed  Mobility Assessment / Plan:  - Out of bed and ambulation as tolerated; may clamp NG tube for ambulation   - PT and OT recommending return to home independently when able  Goals / Barriers for discharge:  - Awaiting return of bowel function   - Case management following  All questions and concerns were addressed  I spent greater than 10 minutes reviewing the plan with the patient and the nurse, and coordinating her care for the day      Gloria Poole PA-C  2/9/2018 9:21 AM

## 2018-02-09 NOTE — PROGRESS NOTES
Progress Note - General Surgery   Wilfredo Fess 71 y o  female MRN: [id number]  Unit/Bed#: [facility] Encounter: [id number]    Assessment:  71 y o  F w/ SBO likely 2/2 adhesions  - Still no bowel function  - Low NGT output, however persistently distended    Plan:  - NPO/NGT  - IVF  - await return of bowel function  - BP control  - OOB/ambulation  - SQH/SCDs      Subjective/Objective     Subjective: No flatus or BM, some nausea yesterday    Objective:     Blood pressure 152/96, pulse 90, temperature 99 1 °F (37 3 °C), temperature source Oral, resp  rate 20, height 5' (1 524 m), weight 81 6 kg (179 lb 14 3 oz), SpO2 95 %  ,Body mass index is 35 13 kg/m²  Intake/Output Summary (Last 24 hours) at 02/09/18 0741  Last data filed at 02/09/18 0732   Gross per 24 hour   Intake          1718 66 ml   Output             1675 ml   Net            43 66 ml       Invasive Devices     Peripheral Intravenous Line            Peripheral IV 02/06/18 Right Antecubital 2 days    Peripheral IV 02/07/18 Right Hand 2 days          Drain            Urostomy Ileal conduit  days    NG/OG/Enteral Tube Nasogastric 18 Fr Right nares 2 days                Physical Exam:   NAD  RRR  Norm resp effort  Abd distended, typanitic, nontender, ileal conduit in place with clear yellow urine    Lab, Imaging and other studies:I have personally reviewed pertinent lab results    , CBC:   Lab Results   Component Value Date    WBC 7 67 02/09/2018    HGB 11 5 02/09/2018    HCT 34 9 02/09/2018    MCV 91 02/09/2018     02/09/2018    MCH 30 0 02/09/2018    MCHC 33 0 02/09/2018    RDW 15 6 (H) 02/09/2018    MPV 9 9 02/09/2018    NRBC 0 02/09/2018   , CMP:   Lab Results   Component Value Date     02/09/2018    K 4 7 02/09/2018     (H) 02/09/2018    CO2 22 02/09/2018    ANIONGAP 7 02/09/2018    BUN 31 (H) 02/09/2018    CREATININE 2 40 (H) 02/09/2018    GLUCOSE 132 02/09/2018    CALCIUM 8 5 02/09/2018    EGFR 20 02/09/2018     VTE Pharmacologic Prophylaxis: Heparin  VTE Mechanical Prophylaxis: sequential compression device

## 2018-02-09 NOTE — SOCIAL WORK
CM spoke with the Pt via telephone to discuss D/C plan  Per PT/OT recommendation is Home therapies, Pt refusing [facility] services at this time  D/C home with family when medically stable

## 2018-02-10 LAB
ANION GAP SERPL CALCULATED.3IONS-SCNC: 7 MMOL/L (ref 4–13)
APTT PPP: 57 SECONDS (ref 23–35)
APTT PPP: 88 SECONDS (ref 23–35)
APTT PPP: 88 SECONDS (ref 23–35)
BUN SERPL-MCNC: 30 MG/DL (ref 5–25)
CALCIUM SERPL-MCNC: 8.1 MG/DL (ref 8.3–10.1)
CHLORIDE SERPL-SCNC: 113 MMOL/L (ref 100–108)
CO2 SERPL-SCNC: 23 MMOL/L (ref 21–32)
CREAT SERPL-MCNC: 2.4 MG/DL (ref 0.6–1.3)
GFR SERPL CREATININE-BSD FRML MDRD: 20 ML/MIN/1.73SQ M
GLUCOSE SERPL-MCNC: 130 MG/DL (ref 65–140)
POTASSIUM SERPL-SCNC: 4.5 MMOL/L (ref 3.5–5.3)
SODIUM SERPL-SCNC: 143 MMOL/L (ref 136–145)

## 2018-02-10 PROCEDURE — 80048 BASIC METABOLIC PNL TOTAL CA: CPT | Performed by: PHYSICIAN ASSISTANT

## 2018-02-10 PROCEDURE — 36569 INSJ PICC 5 YR+ W/O IMAGING: CPT

## 2018-02-10 PROCEDURE — 3E0436Z INTRODUCTION OF NUTRITIONAL SUBSTANCE INTO CENTRAL VEIN, PERCUTANEOUS APPROACH: ICD-10-PCS | Performed by: SURGERY

## 2018-02-10 PROCEDURE — 99232 SBSQ HOSP IP/OBS MODERATE 35: CPT | Performed by: SURGERY

## 2018-02-10 PROCEDURE — C1751 CATH, INF, PER/CENT/MIDLINE: HCPCS

## 2018-02-10 PROCEDURE — C9113 INJ PANTOPRAZOLE SODIUM, VIA: HCPCS | Performed by: STUDENT IN AN ORGANIZED HEALTH CARE EDUCATION/TRAINING PROGRAM

## 2018-02-10 PROCEDURE — 97116 GAIT TRAINING THERAPY: CPT

## 2018-02-10 PROCEDURE — 85730 THROMBOPLASTIN TIME PARTIAL: CPT | Performed by: SURGERY

## 2018-02-10 PROCEDURE — 02HV33Z INSERTION OF INFUSION DEVICE INTO SUPERIOR VENA CAVA, PERCUTANEOUS APPROACH: ICD-10-PCS | Performed by: SURGERY

## 2018-02-10 RX ADMIN — HEPARIN SODIUM 12 UNITS/KG/HR: 10000 INJECTION, SOLUTION INTRAVENOUS at 09:46

## 2018-02-10 RX ADMIN — DEXTROSE, SODIUM CHLORIDE, AND POTASSIUM CHLORIDE 100 ML/HR: 5; .45; .15 INJECTION INTRAVENOUS at 12:41

## 2018-02-10 RX ADMIN — DEXTROSE, SODIUM CHLORIDE, AND POTASSIUM CHLORIDE 100 ML/HR: 5; .45; .15 INJECTION INTRAVENOUS at 22:48

## 2018-02-10 RX ADMIN — PANTOPRAZOLE SODIUM 40 MG: 40 INJECTION, POWDER, FOR SOLUTION INTRAVENOUS at 09:46

## 2018-02-10 RX ADMIN — DEXTROSE, SODIUM CHLORIDE, AND POTASSIUM CHLORIDE 100 ML/HR: 5; .45; .15 INJECTION INTRAVENOUS at 02:08

## 2018-02-10 NOTE — PHYSICAL THERAPY NOTE
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 [address] )     Pulmonary embolism ([facility] [address] ) 2014    Urinary tract infection         Past Surgical History  Past Surgical History:   Procedure Laterality Date    BLADDER SUSPENSION      BOTOX INJECTION N/A 7/27/2016    Procedure: BOTOX INJECTION ;  Surgeon: Kristin Macedo MD;  [facility];  Service:     CHOLECYSTECTOMY N/A     COLONOSCOPY      CYSTECTOMY, RADICAL WITH ILEOCONDUIT N/A 10/4/2016    Procedure: SUPRATRIGONAL CYSTECTOMY WITH ILEAL CONDUIT ;  Surgeon: Kristin Macedo MD;  [facility];  Service:    Precilla Aide W/ RETROGRADES Bilateral 7/27/2016    Procedure: [place]; RETROGRADE PYELOGRAM ;  Surgeon: Kristin Macedo MD;  [facility];  Service:     OR COLONOSCOPY FLX DX W/COLLJ SPEC WHEN PFRMD N/A 8/31/2016    Procedure: COLONOSCOPY;  Surgeon: Christen Gracia MD;  [facility];   Service: Gastroenterology    OR CYSTOSCOPY,INSERT URETERAL STENT Bilateral 7/27/2016    Procedure: STENT INSERTION; EXCISION OF MESH ;  Surgeon: Kristin Macedo MD;  [facility];  Service: Urology    TONSILLECTOMY      TUBAL LIGATION      WISDOM TOOTH EXTRACTION

## 2018-02-10 NOTE — PHYSICAL THERAPY NOTE
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    Hydronephrosis     Hypertension     Hypothyroid     Incontinence     Lung mass     Improving on PET/CT 1/2016    [facility]mia [facility]a [facility] [address]     Pulmonary embolism ([facility] [address] [facility] 2014    Urinary tract infection         Past Surgical History  Past Surgical History:   Procedure Laterality Date    BLADDER SUSPENSION      BOTOX INJECTION N/A 7/27/2016    Procedure: BOTOX INJECTION ;  Surgeon: Dena Winslow MD;  [facility];  Service:     CHOLECYSTECTOMY N/A     COLONOSCOPY      CYSTECTOMY, RADICAL WITH ILEOCONDUIT N/A 10/4/2016    Procedure: SUPRATRIGONAL CYSTECTOMY WITH ILEAL CONDUIT ;  Surgeon: Dena Winslow MD;  [facility];  Service:    [place]ena [place] W/ RETROGRADES Bilateral 7/27/2016    Procedure: Toña Amend; RETROGRADE PYELOGRAM ;  Surgeon: Dena Winslow MD;  [facility];  Service:     HI COLONOSCOPY FLX DX W/COLLJ SPEC WHEN PFRMD N/A 8/31/2016    Procedure: COLONOSCOPY;  Surgeon: Nita Meza MD;  [facility];   Service: Gastroenterology    HI CYSTOSCOPY,INSERT URETERAL STENT Bilateral 7/27/2016    Procedure: STENT INSERTION; EXCISION OF MESH ;  Surgeon: Dena Winslow MD;  [facility];  Service: Urology    TONSILLECTOMY      TUBAL LIGATION      WISDOM TOOTH EXTRACTION             02/10/18 0905   Pain Assessment   Pain Score No Pain Restrictions/Precautions   Other Precautions Multiple lines; Fall Risk  (NPO, NPO, IV, NGT, urostomy)   Cognition   Overall Cognitive Status WFL   Bed Mobility   Additional Comments not assessed this session    Transfers   Sit to Stand 5  Supervision   Additional items Armrests   Stand to Sit 5  Supervision   Additional items Armrests   Stand pivot 5  Supervision   Additional Comments no AD    Ambulation/Elevation   Gait pattern Improper Weight shift; Inconsistent wisam   Gait Assistance 5  Supervision   Additional items Other (Comment)  (assist with lines )   Assistive Device None   Distance 350  (X1, 400 x 1 )   Stair Management Assistance 5  Supervision   Additional items Verbal cues   Stair Management Technique Two rails   Number of Stairs 12  (descends backwards )   Assessment   Prognosis Excellent   Problem List Decreased strength;Decreased endurance; Impaired balance;Decreased mobility   Assessment Pt  tolerated session well but refused to do bike this session  Tx focused increasing activity tolerance through gait training and stairs management  Pt  S level with all functional mobility with no LOB noted without AD but occasionally holds on to railing in the hallway  COnt with PT for now to facilitate safe d/c to home with continued PT services  Barriers to Discharge Inaccessible home environment;Decreased caregiver support   Barriers to Discharge Comments Current multiple lines    Plan   Treatment/Interventions Functional transfer training;LE strengthening/ROM; Elevations; Therapeutic exercise; Endurance training;Patient/family training;Equipment eval/education; Bed mobility;Gait training   Recommendation   Recommendation Home independently;Home PT   Equipment Recommended (none )

## 2018-02-10 NOTE — PROCEDURES
Insert PICC line  Date/Time: 2/10/2018 12:20 PM  Performed by: Yin Sparks  Authorized by: Zak Michael     Patient location:  Bedside  Other Assisting Provider: Yes (comment) (shaunna bergman)    Consent:     Consent obtained:  Written (MD obtained )    Consent given by:  Patient  Universal protocol:     Procedure explained and questions answered to patient or proxy's satisfaction: yes      Relevant documents present and verified: yes      Test results available and properly labeled: yes      Imaging studies available: yes      Required blood products, implants, devices, and special equipment available: yes      Site/side marked: yes      Immediately prior to procedure, a time out was called: yes      Patient identity confirmed:  Verbally with patient and arm band  Pre-procedure details:     Hand hygiene: Hand hygiene performed prior to insertion      Sterile barrier technique: All elements of maximal sterile technique followed      Skin preparation:  ChloraPrep    Skin preparation agent: Skin preparation agent completely dried prior to procedure    Indications:     PICC line indications: total parenteral nutrition    Anesthesia (see MAR for exact dosages):      Anesthesia method:  Local infiltration (3ml )    Local anesthetic:  Lidocaine 1% w/o epi  Procedure details:     Location:  Brachial    Vessel type: vein      Laterality:  Right    Approach: percutaneous endoscopic technique used      Patient position:  Flat    Procedural supplies:  Double lumen    Catheter size:  5 Fr    Landmarks identified: yes      Ultrasound guidance: yes      Sterile ultrasound techniques: Sterile gel and sterile probe covers were used      Number of attempts:  1    Successful placement: no    Post-procedure details:     Post-procedure:  Dressing applied  Comments:      Inadvertently hit artery - PICC aborted, pressure held and pressure dsg applied   Left side veins to small to cannulate referred to IR for picc placement

## 2018-02-10 NOTE — PROGRESS NOTES
Progress Note - General Surgery   Inocencio Hayes 71 y o  female MRN: [id number]  Unit/Bed#: [facility]-01 Encounter: [id number]    Assessment:  71 y o  F w/ SBO likely 2/2 adhesions  - still no bowel function  -NGT 1 6    Plan:  - NPO/NGT  - IVF  - await return of bowel function  - BP control, improving  - OOB/ambulation  - SQH/SCDs      Subjective/Objective     Subjective: Still awaiting bowel function  No flatus or BM    Objective:     Blood pressure 156/83, pulse 80, temperature 99 1 °F (37 3 °C), temperature source Oral, resp  rate 20, height 5' (1 524 m), weight 81 6 kg (179 lb 14 3 oz), SpO2 93 %  ,Body mass index is 35 13 kg/m²  Intake/Output Summary (Last 24 hours) at 02/10/18 2164  Last data filed at 02/10/18 0601   Gross per 24 hour   Intake          2316 67 ml   Output             2525 ml   Net          -208 33 ml       Invasive Devices     Peripheral Intravenous Line            Peripheral IV 02/07/18 Right Hand 3 days          Drain            Urostomy Ileal conduit  days    NG/OG/Enteral Tube Nasogastric 18 Fr Right nares 3 days                Physical Exam:   NAD  RRR  Norm resp effort  Abd distended, typanitic, minimally tender in midline, ileal conduit in place with clear yellow urine    Lab, Imaging and other studies:I have personally reviewed pertinent lab results    , CBC:   No results found for: WBC, HGB, HCT, MCV, PLT, ADJUSTEDWBC, MCH, MCHC, RDW, MPV, NRBC, CMP:   Lab Results   Component Value Date     02/10/2018    K 4 5 02/10/2018     (H) 02/10/2018    CO2 23 02/10/2018    ANIONGAP 7 02/10/2018    BUN 30 (H) 02/10/2018    CREATININE 2 40 (H) 02/10/2018    GLUCOSE 130 02/10/2018    CALCIUM 8 1 (L) 02/10/2018    EGFR 20 02/10/2018     VTE Pharmacologic Prophylaxis: Heparin  VTE Mechanical Prophylaxis: sequential compression device

## 2018-02-11 LAB
ANION GAP SERPL CALCULATED.3IONS-SCNC: 7 MMOL/L (ref 4–13)
APTT PPP: 87 SECONDS (ref 23–35)
BUN SERPL-MCNC: 27 MG/DL (ref 5–25)
CALCIUM SERPL-MCNC: 8.3 MG/DL (ref 8.3–10.1)
CHLORIDE SERPL-SCNC: 113 MMOL/L (ref 100–108)
CO2 SERPL-SCNC: 24 MMOL/L (ref 21–32)
CREAT SERPL-MCNC: 2.34 MG/DL (ref 0.6–1.3)
GFR SERPL CREATININE-BSD FRML MDRD: 20 ML/MIN/1.73SQ M
GLUCOSE SERPL-MCNC: 123 MG/DL (ref 65–140)
MAGNESIUM SERPL-MCNC: 2.1 MG/DL (ref 1.6–2.6)
PHOSPHATE SERPL-MCNC: 2.6 MG/DL (ref 2.3–4.1)
POTASSIUM SERPL-SCNC: 4.4 MMOL/L (ref 3.5–5.3)
SODIUM SERPL-SCNC: 144 MMOL/L (ref 136–145)

## 2018-02-11 PROCEDURE — 99232 SBSQ HOSP IP/OBS MODERATE 35: CPT | Performed by: SURGERY

## 2018-02-11 PROCEDURE — 83735 ASSAY OF MAGNESIUM: CPT | Performed by: STUDENT IN AN ORGANIZED HEALTH CARE EDUCATION/TRAINING PROGRAM

## 2018-02-11 PROCEDURE — 85730 THROMBOPLASTIN TIME PARTIAL: CPT | Performed by: STUDENT IN AN ORGANIZED HEALTH CARE EDUCATION/TRAINING PROGRAM

## 2018-02-11 PROCEDURE — C9113 INJ PANTOPRAZOLE SODIUM, VIA: HCPCS | Performed by: STUDENT IN AN ORGANIZED HEALTH CARE EDUCATION/TRAINING PROGRAM

## 2018-02-11 PROCEDURE — 80048 BASIC METABOLIC PNL TOTAL CA: CPT | Performed by: STUDENT IN AN ORGANIZED HEALTH CARE EDUCATION/TRAINING PROGRAM

## 2018-02-11 PROCEDURE — 84100 ASSAY OF PHOSPHORUS: CPT | Performed by: STUDENT IN AN ORGANIZED HEALTH CARE EDUCATION/TRAINING PROGRAM

## 2018-02-11 RX ADMIN — PANTOPRAZOLE SODIUM 40 MG: 40 INJECTION, POWDER, FOR SOLUTION INTRAVENOUS at 09:37

## 2018-02-11 RX ADMIN — Medication 1 SPRAY: at 09:57

## 2018-02-11 RX ADMIN — DEXTROSE, SODIUM CHLORIDE, AND POTASSIUM CHLORIDE 100 ML/HR: 5; .45; .15 INJECTION INTRAVENOUS at 09:29

## 2018-02-11 RX ADMIN — HEPARIN SODIUM 12.5 UNITS/KG/HR: 10000 INJECTION, SOLUTION INTRAVENOUS at 09:32

## 2018-02-11 RX ADMIN — DEXTROSE, SODIUM CHLORIDE, AND POTASSIUM CHLORIDE 100 ML/HR: 5; .45; .15 INJECTION INTRAVENOUS at 20:43

## 2018-02-11 NOTE — PROGRESS NOTES
Progress Note - General Surgery   Rogelio Anna 71 y o  female MRN: [id number]  Unit/Bed#: -01 Encounter: [id number]    Assessment:  71 y o  F w/ SBO likely 2/2 adhesions  - , +flatus    Plan:  - NPO/NGT  - IVF   - consider clamp trial today  - BP control, improving  - transfer to   - f/u IR for PICC placement  - OOB/ambulation  - SQH/SCDs      Subjective/Objective     Subjective: Passing flatus no BM    Objective:     Blood pressure 141/78, pulse 96, temperature 99 7 °F (37 6 °C), temperature source Oral, resp  rate 20, height 5' (1 524 m), weight 81 6 kg (179 lb 14 3 oz), SpO2 95 %  ,Body mass index is 35 13 kg/m²  Intake/Output Summary (Last 24 hours) at 02/11/18 0429  Last data filed at 02/10/18 2248   Gross per 24 hour   Intake              920 ml   Output             2875 ml   Net            -1955 ml       Invasive Devices     Peripheral Intravenous Line            Peripheral IV 02/07/18 Right Hand 3 days          Drain            Urostomy Ileal conduit  days    NG/OG/Enteral Tube Nasogastric 18 Fr Right nares 4 days                Physical Exam:   NAD  RRR  Norm resp effort  Abd w improving distension, NT, ileal conduit in place with clear yellow urine    Lab, Imaging and other studies:I have personally reviewed pertinent lab results    , CBC:   No results found for: WBC, HGB, HCT, MCV, PLT, ADJUSTEDWBC, MCH, MCHC, RDW, MPV, NRBC, CMP:   Lab Results   Component Value Date     02/10/2018    K 4 5 02/10/2018     (H) 02/10/2018    CO2 23 02/10/2018    ANIONGAP 7 02/10/2018    BUN 30 (H) 02/10/2018    CREATININE 2 40 (H) 02/10/2018    GLUCOSE 130 02/10/2018    CALCIUM 8 1 (L) 02/10/2018    EGFR 20 02/10/2018     VTE Pharmacologic Prophylaxis: Heparin  VTE Mechanical Prophylaxis: sequential compression device

## 2018-02-11 NOTE — CONSULTS
Received consult for TPN recs;  Full evaluation documented in flow sheet;     TPN recs as follows:    if planned for TPN: start with standard for the 1st day, monitor TPN labs; adjust any abnormalities prior to incr to goal: 30% dextrose @900ml; 10% aa @650ml; 20% lipids @300ml; will provide 1850ml volume; 1778kcal; 65g pro, 270g cho, 60g fat

## 2018-02-11 NOTE — PLAN OF CARE
Problem: Prexisting or High Potential for Compromised Skin Integrity  Goal: Skin integrity is maintained or improved  INTERVENTIONS:  - Identify patients at risk for skin breakdown  - Assess and monitor skin integrity  - Assess and monitor nutrition and hydration status  - Monitor labs (i e  albumin)  - Assess for incontinence   - Turn and reposition patient  - Assist with mobility/ambulation  - Relieve pressure over bony prominences  - Avoid friction and shearing  - Provide appropriate hygiene as needed including keeping skin clean and dry  - Evaluate need for skin moisturizer/barrier cream  - Collaborate with interdisciplinary team (i e  Nutrition, Rehabilitation, etc )   - Patient/family teaching   Outcome: Progressing      Problem: Potential for Falls  Goal: Patient will remain free of falls  INTERVENTIONS:  - Assess patient frequently for physical needs  -  Identify cognitive and physical deficits and behaviors that affect risk of falls    -  [place] fall precautions as indicated by assessment   - Educate patient/family on patient safety including physical limitations  - Instruct patient to call for assistance with activity based on assessment  - Modify environment to reduce risk of injury  - Consider OT/PT consult to assist with strengthening/mobility   Outcome: Progressing      Problem: PAIN - ADULT  Goal: Verbalizes/displays adequate comfort level or baseline comfort level  Interventions:  - Encourage patient to monitor pain and request assistance  - Assess pain using appropriate pain scale  - Administer analgesics based on type and severity of pain and evaluate response  - Implement non-pharmacological measures as appropriate and evaluate response  - Consider cultural and social influences on pain and pain management  - Notify physician/advanced practitioner if interventions unsuccessful or patient reports new pain   Outcome: Progressing      Problem: INFECTION - ADULT  Goal: Absence or prevention of progression during hospitalization  INTERVENTIONS:  - Assess and monitor for signs and symptoms of infection  - Monitor lab/diagnostic results  - Monitor all insertion sites, i e  indwelling lines, tubes, and drains  - Monitor endotracheal (as able) and nasal secretions for changes in amount and color  - [place] appropriate cooling/warming therapies per order  - Administer medications as ordered  - Instruct and encourage patient and family to use good hand hygiene technique  - Identify and instruct in appropriate isolation precautions for identified infection/condition   Outcome: Progressing    Goal: Absence of fever/infection during neutropenic period  INTERVENTIONS:  - Monitor WBC  - Implement neutropenic guidelines   Outcome: Progressing      Problem: SAFETY ADULT  Goal: Maintain or return to baseline ADL function  INTERVENTIONS:  -  Assess patient's ability to carry out ADLs; assess patient's baseline for ADL function and identify physical deficits which impact ability to perform ADLs (bathing, care of mouth/teeth, toileting, grooming, dressing, etc )  - Assess/evaluate cause of self-care deficits   - Assess range of motion  - Assess patient's mobility; develop plan if impaired  - Assess patient's need for assistive devices and provide as appropriate  - Encourage maximum independence but intervene and supervise when necessary  ¯ Involve family in performance of ADLs  ¯ Assess for home care needs following discharge   ¯ Request OT consult to assist with ADL evaluation and planning for discharge  ¯ Provide patient education as appropriate   Outcome: Progressing    Goal: Maintain or return mobility status to optimal level  INTERVENTIONS:  - Assess patient's baseline mobility status (ambulation, transfers, stairs, etc )    - Identify cognitive and physical deficits and behaviors that affect mobility  - Identify mobility aids required to assist with transfers and/or ambulation (gait belt, sit-to-stand, lift, walker, cane, etc )  - [place] fall precautions as indicated by assessment  - Record patient progress and toleration of activity level on Mobility SBAR; progress patient to next Phase/Stage  - Instruct patient to call for assistance with activity based on assessment  - Request Rehabilitation consult to assist with strengthening/weightbearing, etc    Outcome: Progressing      Problem: DISCHARGE PLANNING  Goal: Discharge to home or other facility with appropriate resources  INTERVENTIONS:  - Identify barriers to discharge w/patient and caregiver  - Arrange for needed discharge resources and transportation as appropriate  - Identify discharge learning needs (meds, wound care, etc )  - Arrange for interpretive services to assist at discharge as needed  - Refer to Case Management Department for coordinating discharge planning if the patient needs post-hospital services based on physician/advanced practitioner order or complex needs related to functional status, cognitive ability, or social support system   Outcome: Progressing      Problem: Knowledge Deficit  Goal: Patient/family/caregiver demonstrates understanding of disease process, treatment plan, medications, and discharge instructions  Complete learning assessment and assess knowledge base    Interventions:  - Provide teaching at level of understanding  - Provide teaching via preferred learning methods   Outcome: Progressing

## 2018-02-11 NOTE — PLAN OF CARE
Problem: Nutrition/Hydration-ADULT  Goal: Nutrient/Hydration intake appropriate for improving, restoring or maintaining nutritional needs  Monitor and assess patient's nutrition/hydration status for malnutrition (ex- brittle hair, bruises, dry skin, pale skin and conjunctiva, muscle wasting, smooth red tongue, and disorientation)  Collaborate with interdisciplinary team and initiate plan and interventions as ordered  Monitor patient's weight and dietary intake as ordered or per policy  Utilize nutrition screening tool and intervene per policy  Determine patient's food preferences and provide high-protein, high-caloric foods as appropriate       INTERVENTIONS:  - Monitor oral intake, urinary output, labs, and treatment plans  - Assess nutrition and hydration status and recommend course of action  - Evaluate amount of meals eaten  - Assist patient with eating if necessary   - Allow adequate time for meals  - Recommend/ encourage appropriate diets, oral nutritional supplements, and vitamin/mineral supplements  - Order, calculate, and assess calorie counts as needed  - Recommend, monitor, and adjust tube feedings and TPN/PPN based on assessed needs  - Assess need for intravenous fluids  - Provide specific nutrition/hydration education as appropriate  - Include patient/family/caregiver in decisions related to nutrition  Outcome: Progressing  Possible TPN / needs PICC line; per chart review from today: possible clamp trial

## 2018-02-11 NOTE — PROGRESS NOTES
Pt without complaints Doctor's note from this morning noted that pt to be transferred to p8  So I called report to p8 nurse  Pt aware and agreeable I paged red surgery resident to let them know  But no reply

## 2018-02-12 ENCOUNTER — APPOINTMENT (INPATIENT)
Dept: RADIOLOGY | Facility: HOSPITAL | Age: 73
DRG: 389 | End: 2018-02-12
Payer: COMMERCIAL

## 2018-02-12 ENCOUNTER — APPOINTMENT (INPATIENT)
Dept: RADIOLOGY | Facility: HOSPITAL | Age: 73
DRG: 389 | End: 2018-02-12
Attending: SURGERY
Payer: COMMERCIAL

## 2018-02-12 DIAGNOSIS — D45 POLYCYTHEMIA VERA (HCC): Primary | ICD-10-CM

## 2018-02-12 LAB
ANION GAP SERPL CALCULATED.3IONS-SCNC: 5 MMOL/L (ref 4–13)
BASOPHILS # BLD AUTO: 0.02 THOUSANDS/ΜL (ref 0–0.1)
BASOPHILS NFR BLD AUTO: 0 % (ref 0–1)
BUN SERPL-MCNC: 22 MG/DL (ref 5–25)
CALCIUM SERPL-MCNC: 8 MG/DL (ref 8.3–10.1)
CHLORIDE SERPL-SCNC: 115 MMOL/L (ref 100–108)
CO2 SERPL-SCNC: 24 MMOL/L (ref 21–32)
CREAT SERPL-MCNC: 2.21 MG/DL (ref 0.6–1.3)
EOSINOPHIL # BLD AUTO: 0.13 THOUSAND/ΜL (ref 0–0.61)
EOSINOPHIL NFR BLD AUTO: 2 % (ref 0–6)
ERYTHROCYTE [DISTWIDTH] IN BLOOD BY AUTOMATED COUNT: 15.1 % (ref 11.6–15.1)
GFR SERPL CREATININE-BSD FRML MDRD: 22 ML/MIN/1.73SQ M
GLUCOSE SERPL-MCNC: 119 MG/DL (ref 65–140)
HCT VFR BLD AUTO: 29.7 % (ref 34.8–46.1)
HGB BLD-MCNC: 9.9 G/DL (ref 11.5–15.4)
LYMPHOCYTES # BLD AUTO: 0.59 THOUSANDS/ΜL (ref 0.6–4.47)
LYMPHOCYTES NFR BLD AUTO: 10 % (ref 14–44)
MAGNESIUM SERPL-MCNC: 2 MG/DL (ref 1.6–2.6)
MCH RBC QN AUTO: 30.5 PG (ref 26.8–34.3)
MCHC RBC AUTO-ENTMCNC: 33.3 G/DL (ref 31.4–37.4)
MCV RBC AUTO: 91 FL (ref 82–98)
MONOCYTES # BLD AUTO: 0.39 THOUSAND/ΜL (ref 0.17–1.22)
MONOCYTES NFR BLD AUTO: 6 % (ref 4–12)
NEUTROPHILS # BLD AUTO: 5.05 THOUSANDS/ΜL (ref 1.85–7.62)
NEUTS SEG NFR BLD AUTO: 82 % (ref 43–75)
NRBC BLD AUTO-RTO: 0 /100 WBCS
PLATELET # BLD AUTO: 230 THOUSANDS/UL (ref 149–390)
PMV BLD AUTO: 10.1 FL (ref 8.9–12.7)
POTASSIUM SERPL-SCNC: 4.3 MMOL/L (ref 3.5–5.3)
PREALB SERPL-MCNC: 14.8 MG/DL (ref 18–40)
RBC # BLD AUTO: 3.25 MILLION/UL (ref 3.81–5.12)
SODIUM SERPL-SCNC: 144 MMOL/L (ref 136–145)
WBC # BLD AUTO: 6.21 THOUSAND/UL (ref 4.31–10.16)

## 2018-02-12 PROCEDURE — C9113 INJ PANTOPRAZOLE SODIUM, VIA: HCPCS | Performed by: STUDENT IN AN ORGANIZED HEALTH CARE EDUCATION/TRAINING PROGRAM

## 2018-02-12 PROCEDURE — 83735 ASSAY OF MAGNESIUM: CPT | Performed by: SURGERY

## 2018-02-12 PROCEDURE — 36569 INSJ PICC 5 YR+ W/O IMAGING: CPT

## 2018-02-12 PROCEDURE — 80048 BASIC METABOLIC PNL TOTAL CA: CPT | Performed by: SURGERY

## 2018-02-12 PROCEDURE — 99231 SBSQ HOSP IP/OBS SF/LOW 25: CPT | Performed by: SURGERY

## 2018-02-12 PROCEDURE — 77001 FLUOROGUIDE FOR VEIN DEVICE: CPT

## 2018-02-12 PROCEDURE — C1751 CATH, INF, PER/CENT/MIDLINE: HCPCS

## 2018-02-12 PROCEDURE — 74018 RADEX ABDOMEN 1 VIEW: CPT

## 2018-02-12 PROCEDURE — 76937 US GUIDE VASCULAR ACCESS: CPT

## 2018-02-12 PROCEDURE — 85025 COMPLETE CBC W/AUTO DIFF WBC: CPT | Performed by: SURGERY

## 2018-02-12 PROCEDURE — 84134 ASSAY OF PREALBUMIN: CPT | Performed by: SURGERY

## 2018-02-12 RX ORDER — RUXOLITINIB 10 MG/1
TABLET ORAL
Qty: 30 TABLET | Refills: 5 | Status: SHIPPED | OUTPATIENT
Start: 2018-02-12 | End: 2018-08-24 | Stop reason: SDUPTHER

## 2018-02-12 RX ADMIN — HEPARIN SODIUM 12 UNITS/KG/HR: 10000 INJECTION, SOLUTION INTRAVENOUS at 10:12

## 2018-02-12 RX ADMIN — LABETALOL 20 MG/4 ML (5 MG/ML) INTRAVENOUS SYRINGE 10 MG: at 12:27

## 2018-02-12 RX ADMIN — DEXTROSE, SODIUM CHLORIDE, AND POTASSIUM CHLORIDE 100 ML/HR: 5; .45; .15 INJECTION INTRAVENOUS at 16:30

## 2018-02-12 RX ADMIN — PANTOPRAZOLE SODIUM 40 MG: 40 INJECTION, POWDER, FOR SOLUTION INTRAVENOUS at 10:11

## 2018-02-12 NOTE — PROCEDURES
PICC Line Insertion  Date/Time: 2/12/2018 8:42 AM  Performed by: Wolfgang Montalvo by: Yunior Guthrie     Patient location:  IR  Consent:     Consent obtained:  Written and verbal    Consent given by:  Patient    Risks discussed:  Incorrect placement, infection, nerve damage and bleeding  Universal protocol:     Procedure explained and questions answered to patient or proxy's satisfaction: yes      Relevant documents present and verified: yes      Test results available and properly labeled: yes      Imaging studies available: yes      Required blood products, implants, devices, and special equipment available: yes      Site/side marked: yes      Immediately prior to procedure, a time out was called: yes      Patient identity confirmed:  Verbally with patient, hospital-assigned identification number and arm band  Pre-procedure details:     Hand hygiene: Hand hygiene performed prior to insertion      Sterile barrier technique: All elements of maximal sterile technique followed      Skin preparation:  ChloraPrep    Skin preparation agent: Skin preparation agent completely dried prior to procedure    Indications:     PICC line indications: total parenteral nutrition    Anesthesia (see MAR for exact dosages):      Anesthesia method:  None  Procedure details:     Location:  Cephalic    Vessel type: vein      Laterality:  Left    Approach: percutaneous technique used      Patient position:  Flat    Procedural supplies:  Double lumen    Catheter size:  5 Fr    Landmarks identified: yes      Ultrasound guidance: yes      Sterile ultrasound techniques: Sterile gel and sterile probe covers were used      Number of attempts:  1    Successful placement: yes      Vessel of catheter tip end:  Chest Xray needed to confirm placement    Total catheter length (cm):  40    Catheter out on skin (cm):  0    Max flow rate:  999 mls /hr    Arm circumference:  37 cm  Post-procedure details:     Post-procedure:  Dressing applied Assessment:  Blood return through all ports and placement verified by x-ray    Patient tolerance of procedure:   Tolerated well, no immediate complications    Observer: Yes      Observer name:  Boogie MERCADO

## 2018-02-12 NOTE — PROGRESS NOTES
Patient care rounds were completed with the patient's nurse today, Joselyn Araiza  We discussed the plan is to keep her NPO with her NG tube in place, but attempt NG tube clamp trial today as KUB appears to show gas throughout her colon and she is passing flatus and had a bowel movement  PICC line in place, continue for IV access and heparin drip  Will initiate TPN if NG tube continues to demonstrate high output and/or patient fails clamp trial     We reviewed all of the invasive devices/lines/telemetry orders   - Continue PICC line for possible TPN  Pain Assessment / Plan:  - Continue current analgesic regimen  Mobility Assessment / Plan:  - Out of bed and ambulation as tolerated  Goals / Barriers for discharge:  - Awaiting resumption of normal bowel function, discontinuation of NG tube, and resumption of normal diet  - Case management following  All questions and concerns were addressed  I spent greater than 10 minutes reviewing the plan with the patient and the nurse, and coordinating her care for the day      Edith Shore PA-C  2/12/2018 10:12 AM

## 2018-02-12 NOTE — OCCUPATIONAL THERAPY NOTE
Occupational Therapy  Attempt see pt this a m  However pt is currently off floor and unavailable at this time, continue follow pt as able with current POC    Elizabeth Kumar

## 2018-02-12 NOTE — PROGRESS NOTES
Progress Note - General Surgery   Justino Tenorio 71 y o  female MRN: [id number]  Unit/Bed#: [facility] 834-01 Encounter: [id number]    Assessment:  71 y o  F w/ SBO likely 2/2 adhesions  - , +flatus/BM    Plan:  - NPO/NGT  - IVF   - consider clamp trial today  - BP control, improving  - f/u IR for PICC placement  - OOB/ambulation  - SQH/SCDs      Subjective/Objective     Subjective: Passing flatus, had a BM overnight    Objective:     Blood pressure 147/82, pulse 83, temperature 99 7 °F (37 6 °C), temperature source Oral, resp  rate 18, height 5' (1 524 m), weight 81 6 kg (179 lb 14 3 oz), SpO2 93 %  ,Body mass index is 35 13 kg/m²  Intake/Output Summary (Last 24 hours) at 02/12/18 0714  Last data filed at 02/12/18 0300   Gross per 24 hour   Intake          1994 43 ml   Output             1800 ml   Net           194 43 ml       Invasive Devices     Peripheral Intravenous Line            Peripheral IV 02/07/18 Right Hand 5 days          Drain            Urostomy Ileal conduit  days    NG/OG/Enteral Tube Nasogastric 18 Fr Right nares 5 days                Physical Exam:   NAD  RRR  Norm resp effort  Abd with improving distension, NT, ileal conduit in place with clear yellow urine    Lab, Imaging and other studies:I have personally reviewed pertinent lab results    , CBC:   Lab Results   Component Value Date    WBC 6 21 02/12/2018    HGB 9 9 (L) 02/12/2018    HCT 29 7 (L) 02/12/2018    MCV 91 02/12/2018     02/12/2018    MCH 30 5 02/12/2018    MCHC 33 3 02/12/2018    RDW 15 1 02/12/2018    MPV 10 1 02/12/2018    NRBC 0 02/12/2018   , CMP:   Lab Results   Component Value Date     02/12/2018    K 4 3 02/12/2018     (H) 02/12/2018    CO2 24 02/12/2018    ANIONGAP 5 02/12/2018    BUN 22 02/12/2018    CREATININE 2 21 (H) 02/12/2018    GLUCOSE 119 02/12/2018    CALCIUM 8 0 (L) 02/12/2018    EGFR 22 02/12/2018     VTE Pharmacologic Prophylaxis: Heparin  VTE Mechanical Prophylaxis: sequential compression device

## 2018-02-12 NOTE — PROGRESS NOTES
Rounded with Tash Cornell PA-C will do a clamp trial on NGT, tube clamped at 21  will continue to monitor patient, NG back to suction at 1413

## 2018-02-13 LAB
ANION GAP SERPL CALCULATED.3IONS-SCNC: 7 MMOL/L (ref 4–13)
APTT PPP: 58 SECONDS (ref 23–35)
APTT PPP: 64 SECONDS (ref 23–35)
APTT PPP: 87 SECONDS (ref 23–35)
BUN SERPL-MCNC: 15 MG/DL (ref 5–25)
CALCIUM SERPL-MCNC: 7.7 MG/DL (ref 8.3–10.1)
CHLORIDE SERPL-SCNC: 111 MMOL/L (ref 100–108)
CO2 SERPL-SCNC: 23 MMOL/L (ref 21–32)
CREAT SERPL-MCNC: 2.07 MG/DL (ref 0.6–1.3)
GFR SERPL CREATININE-BSD FRML MDRD: 24 ML/MIN/1.73SQ M
GLUCOSE SERPL-MCNC: 150 MG/DL (ref 65–140)
MAGNESIUM SERPL-MCNC: 1.8 MG/DL (ref 1.6–2.6)
PHOSPHATE SERPL-MCNC: 3.2 MG/DL (ref 2.3–4.1)
POTASSIUM SERPL-SCNC: 4.3 MMOL/L (ref 3.5–5.3)
SODIUM SERPL-SCNC: 141 MMOL/L (ref 136–145)

## 2018-02-13 PROCEDURE — C9113 INJ PANTOPRAZOLE SODIUM, VIA: HCPCS | Performed by: STUDENT IN AN ORGANIZED HEALTH CARE EDUCATION/TRAINING PROGRAM

## 2018-02-13 PROCEDURE — 84100 ASSAY OF PHOSPHORUS: CPT | Performed by: STUDENT IN AN ORGANIZED HEALTH CARE EDUCATION/TRAINING PROGRAM

## 2018-02-13 PROCEDURE — 83735 ASSAY OF MAGNESIUM: CPT | Performed by: STUDENT IN AN ORGANIZED HEALTH CARE EDUCATION/TRAINING PROGRAM

## 2018-02-13 PROCEDURE — 99232 SBSQ HOSP IP/OBS MODERATE 35: CPT | Performed by: SURGERY

## 2018-02-13 PROCEDURE — 85730 THROMBOPLASTIN TIME PARTIAL: CPT | Performed by: SURGERY

## 2018-02-13 PROCEDURE — 80048 BASIC METABOLIC PNL TOTAL CA: CPT | Performed by: STUDENT IN AN ORGANIZED HEALTH CARE EDUCATION/TRAINING PROGRAM

## 2018-02-13 RX ORDER — OXYCODONE HYDROCHLORIDE 5 MG/1
5 TABLET ORAL EVERY 4 HOURS PRN
Status: DISCONTINUED | OUTPATIENT
Start: 2018-02-13 | End: 2018-02-14 | Stop reason: HOSPADM

## 2018-02-13 RX ORDER — CALCITRIOL 0.25 UG/1
0.25 CAPSULE, LIQUID FILLED ORAL DAILY
Status: DISCONTINUED | OUTPATIENT
Start: 2018-02-13 | End: 2018-02-14 | Stop reason: HOSPADM

## 2018-02-13 RX ORDER — LEVOTHYROXINE SODIUM 0.07 MG/1
75 TABLET ORAL
Status: DISCONTINUED | OUTPATIENT
Start: 2018-02-13 | End: 2018-02-14 | Stop reason: HOSPADM

## 2018-02-13 RX ORDER — SACCHAROMYCES BOULARDII 250 MG
250 CAPSULE ORAL 2 TIMES DAILY
Status: DISCONTINUED | OUTPATIENT
Start: 2018-02-13 | End: 2018-02-14 | Stop reason: HOSPADM

## 2018-02-13 RX ORDER — ACETAMINOPHEN 325 MG/1
650 TABLET ORAL EVERY 4 HOURS PRN
Status: DISCONTINUED | OUTPATIENT
Start: 2018-02-13 | End: 2018-02-14 | Stop reason: HOSPADM

## 2018-02-13 RX ORDER — MELATONIN
1000 DAILY
Status: DISCONTINUED | OUTPATIENT
Start: 2018-02-13 | End: 2018-02-14 | Stop reason: HOSPADM

## 2018-02-13 RX ORDER — OXYCODONE HYDROCHLORIDE 5 MG/1
2.5 TABLET ORAL EVERY 4 HOURS PRN
Status: DISCONTINUED | OUTPATIENT
Start: 2018-02-13 | End: 2018-02-14 | Stop reason: HOSPADM

## 2018-02-13 RX ORDER — AMLODIPINE BESYLATE 2.5 MG/1
2.5 TABLET ORAL DAILY
Status: DISCONTINUED | OUTPATIENT
Start: 2018-02-13 | End: 2018-02-14 | Stop reason: HOSPADM

## 2018-02-13 RX ADMIN — DEXTROSE, SODIUM CHLORIDE, AND POTASSIUM CHLORIDE 100 ML/HR: 5; .45; .15 INJECTION INTRAVENOUS at 02:39

## 2018-02-13 RX ADMIN — VITAMIN D, TAB 1000IU (100/BT) 1000 UNITS: 25 TAB at 13:27

## 2018-02-13 RX ADMIN — AMLODIPINE BESYLATE 2.5 MG: 2.5 TABLET ORAL at 13:27

## 2018-02-13 RX ADMIN — METOPROLOL TARTRATE 12.5 MG: 25 TABLET ORAL at 13:27

## 2018-02-13 RX ADMIN — METOPROLOL TARTRATE 12.5 MG: 25 TABLET ORAL at 20:21

## 2018-02-13 RX ADMIN — Medication 250 MG: at 13:27

## 2018-02-13 RX ADMIN — HEPARIN SODIUM 14 UNITS/KG/HR: 10000 INJECTION, SOLUTION INTRAVENOUS at 12:58

## 2018-02-13 RX ADMIN — CALCITRIOL 0.25 MCG: 0.25 CAPSULE, LIQUID FILLED ORAL at 13:27

## 2018-02-13 RX ADMIN — DEXTROSE, SODIUM CHLORIDE, AND POTASSIUM CHLORIDE 50 ML/HR: 5; .45; .15 INJECTION INTRAVENOUS at 23:03

## 2018-02-13 RX ADMIN — PANTOPRAZOLE SODIUM 40 MG: 40 INJECTION, POWDER, FOR SOLUTION INTRAVENOUS at 09:34

## 2018-02-13 RX ADMIN — Medication 250 MG: at 18:16

## 2018-02-13 RX ADMIN — LEVOTHYROXINE SODIUM 75 MCG: 75 TABLET ORAL at 13:27

## 2018-02-13 NOTE — PROGRESS NOTES
Patient care rounds were completed with the patient's nurse today, Mary Smith  We discussed the plan is to advance her diet to clear liquid diet with toast and crackers  Saline lock her IV today except for the heparin drip  Continue heparin drip until Eliquis restarted likely tomorrow  Resume other home medication therapy  Anticipate discharge home in the next 24-48 hours  We reviewed all of the invasive devices/lines/telemetry orders   - Continue PICC line for IV access; anticipate discontinuation of line prior to discharge  Pain Assessment / Plan:  - Continue oral analgesic regimen  Mobility Assessment / Plan:  - Continue PT and OT evaluation and treatment as indicated with recommendations for home therapy  Goals / Barriers for discharge:  - Awaiting advancement of oral diet and transition off of heparin drip  - Case management following   - Anticipate discharge in the next 24-48 hours  All questions and concerns were addressed  I spent greater than 15 minutes reviewing the plan with the patient and the nurse, and coordinating her care for the day      Nataliia Leon PA-C  2/13/2018 11:48 AM

## 2018-02-13 NOTE — CASE MANAGEMENT
Continued Stay Review    Thank you,  [address] [facility] in the [facility] by Venu Delgado for 2017  Network Utilization Review Department  Phone: [phone]; Fax [phone]  ATTENTION: The Network Utilization Review Department is now centralized for our 7 Facilities  Make a note that we have a new phone and fax numbers for our Department  Please call with any questions or concerns to [phone] and carefully follow the prompts so that you are directed to the right person  All voicemails are confidential  Fax any determinations, approvals, denials, and requests for initial or continue stay review clinical to [phone]  Due to HIGH CALL volume, it would be easier if you could please send faxed requests to expedite your requests and in part, help us provide discharge notifications faster  Date: 3/13/2018    Vital Signs: /67 (BP Location: Right arm)   Pulse 85   Temp 98 5 °F (36 9 °C) (Oral)   Resp 18   Ht 5' (1 524 m)   Wt 81 6 kg (179 lb 14 3 oz)   SpO2 95%   BMI 35 13 kg/m²     Medications:   Scheduled Meds:   Current Facility-Administered Medications:  pantoprazole 40 mg Intravenous Q24H [facility] & longterm      Continuous Infusions:   dextrose 5 % and sodium chloride 0 45 % with KCl 20 mEq/L 50 mL/hr Last Rate: 50 mL/hr (02/13/18 0904)   heparin (porcine) 3-30 Units/kg/hr (Order-Specific) Last Rate: 14 Units/kg/hr (02/13/18 0929)     PRN Meds:   acetaminophen    hydrALAZINE    HYDROmorphone    Labetalol 10 mg iv - 2/12 x1    ondansetron    oxyCODONE    oxyCODONE    phenol     Nursing orders - VS q 4- Up and Oob as tolerated - I & O q shift - SCD's to le's- diet to clears on 2/13 - PT/OT treatment     Abnormal Labs/Diagnostic Results: 2/13 Labs 0 Chloride 111- Bun/cr 15/2 07- Slade 7 7   2/12 - H/H 9 9/29 7    XR abd - 2/12 - Findings suggest partial improvement/improving small bowel obstruction   2/13 - PICC line placement - 1   Status post placement of a 5-[place] double-lumen central venous catheter via the left cephalic vein with its tip at the cavoatrial junction under ultrasound and fluoroscopic guidance      Age/Sex: 71 y o  female     Assessment/Plan:   Progress note 2/13       Assessment:  71 y o  F w/ SBO likely 2/2 adhesions  - NGT d/c 2/12     Plan:  Advance to clears  PICC/TPN  IS/OOB  PT/OT  SCDs, heparin gtt       Discharge Plan:  Home with family when medically stable

## 2018-02-13 NOTE — PROGRESS NOTES
Rounded with Mandy Mao PA-C will decrease fluids to [facility] 20 ml/hr, heparin drip to continue until patient is able to tolerate diet, then eliquis will be restarted

## 2018-02-13 NOTE — PROGRESS NOTES
Progress Note - General Surgery   Denver Ledy 71 y o  female MRN: [id number]  Unit/Bed#: [facility] 834-01 Encounter: [id number]    Assessment:  71 y o  F w/ SBO likely 2/2 adhesions  - NGT d/c 2/12    Plan:  Advance to clears  PICC/TPN  IS/OOB  PT/OT  SCDs, heparin gtt      Subjective/Objective     Subjective: +F, small BM  OOB  Tolerating sips, no N/V  Objective:     Blood pressure 121/71, pulse 84, temperature 98 5 °F (36 9 °C), temperature source Oral, resp  rate 19, height 5' (1 524 m), weight 81 6 kg (179 lb 14 3 oz), SpO2 95 %  ,Body mass index is 35 13 kg/m²  Intake/Output Summary (Last 24 hours) at 02/13/18 0443  Last data filed at 02/13/18 0239   Gross per 24 hour   Intake          1893 06 ml   Output             1750 ml   Net           143 06 ml       Invasive Devices     Peripherally Inserted Central Catheter Line            PICC Line 02/12/18 less than 1 day    PICC Line 98/80/62 Left Cephalic less than 1 day          Drain            Urostomy Ileal conduit  days                Physical Exam: NAD  AAOx3  Normal respiratory effort  Soft, NT, ND  No c/c/e      Lab, Imaging and other studies:I have personally reviewed pertinent lab results    , CBC:   No results found for: WBC, HGB, HCT, MCV, PLT, ADJUSTEDWBC, MCH, MCHC, RDW, MPV, NRBC, CMP:   No results found for: NA, K, CL, CO2, ANIONGAP, BUN, CREATININE, GLUCOSE, CALCIUM, AST, ALT, ALKPHOS, PROT, ALBUMIN, BILITOT, EGFR  VTE Pharmacologic Prophylaxis: Heparin  VTE Mechanical Prophylaxis: sequential compression device

## 2018-02-13 NOTE — SOCIAL WORK
CM rounded with [place] and pt will be medically clear within next 24-48 hours  Pt diet advanced and transition off heparin gtt     When  Medically clear pt declined vna and family will transport home

## 2018-02-14 VITALS
HEART RATE: 110 BPM | TEMPERATURE: 99 F | OXYGEN SATURATION: 97 % | WEIGHT: 179.9 LBS | BODY MASS INDEX: 35.32 KG/M2 | DIASTOLIC BLOOD PRESSURE: 83 MMHG | HEIGHT: 60 IN | SYSTOLIC BLOOD PRESSURE: 138 MMHG | RESPIRATION RATE: 17 BRPM

## 2018-02-14 LAB
APTT PPP: 90 SECONDS (ref 23–35)
GLUCOSE SERPL-MCNC: 102 MG/DL (ref 65–140)

## 2018-02-14 PROCEDURE — 82948 REAGENT STRIP/BLOOD GLUCOSE: CPT

## 2018-02-14 PROCEDURE — 97535 SELF CARE MNGMENT TRAINING: CPT

## 2018-02-14 PROCEDURE — 97530 THERAPEUTIC ACTIVITIES: CPT

## 2018-02-14 PROCEDURE — 99238 HOSP IP/OBS DSCHRG MGMT 30/<: CPT | Performed by: SURGERY

## 2018-02-14 PROCEDURE — C9113 INJ PANTOPRAZOLE SODIUM, VIA: HCPCS | Performed by: STUDENT IN AN ORGANIZED HEALTH CARE EDUCATION/TRAINING PROGRAM

## 2018-02-14 PROCEDURE — 85730 THROMBOPLASTIN TIME PARTIAL: CPT | Performed by: SURGERY

## 2018-02-14 RX ADMIN — LEVOTHYROXINE SODIUM 75 MCG: 75 TABLET ORAL at 05:01

## 2018-02-14 RX ADMIN — AMLODIPINE BESYLATE 2.5 MG: 2.5 TABLET ORAL at 09:27

## 2018-02-14 RX ADMIN — APIXABAN 2.5 MG: 2.5 TABLET, FILM COATED ORAL at 09:26

## 2018-02-14 RX ADMIN — METOPROLOL TARTRATE 12.5 MG: 25 TABLET ORAL at 09:26

## 2018-02-14 RX ADMIN — Medication 250 MG: at 09:26

## 2018-02-14 RX ADMIN — HYDRALAZINE HYDROCHLORIDE 10 MG: 20 INJECTION INTRAMUSCULAR; INTRAVENOUS at 00:05

## 2018-02-14 RX ADMIN — PANTOPRAZOLE SODIUM 40 MG: 40 INJECTION, POWDER, FOR SOLUTION INTRAVENOUS at 09:25

## 2018-02-14 RX ADMIN — CALCITRIOL 0.25 MCG: 0.25 CAPSULE, LIQUID FILLED ORAL at 09:26

## 2018-02-14 RX ADMIN — VITAMIN D, TAB 1000IU (100/BT) 1000 UNITS: 25 TAB at 09:26

## 2018-02-14 NOTE — PLAN OF CARE
Problem: OCCUPATIONAL THERAPY ADULT  Goal: Performs self-care activities at highest level of function for planned discharge setting  See evaluation for individualized goals  Treatment Interventions: ADL retraining, Functional transfer training, Endurance training, UE strengthening/ROM, Patient/family training, Equipment evaluation/education, Energy conservation, Activityengagement          See flowsheet documentation for full assessment, interventions and recommendations  Outcome: Completed Date Met: 02/14/18  Limitation: Decreased ADL status, Decreased endurance, Decreased UE strength  Prognosis: Good  Assessment: Pt was seen this date for OT tx session focusing on LB dressing, and extensive education o home safety and energy consevation  Pt demsontrates ability to don sock and pants with SBA  MOd I for STS and throguout dressig task in stance  Pt was extensivly educaetd on home safety and energy conservation  Pt reports having following DME: WR, bed side commode, shower chair and grab bars  Pt states she has no concerns for home and does not wish to have home health services  t has good understanding of all education provided, handouts given  No immdiate OT services required at this tie as pt has met alll previously established goals   d/c OT      OT Discharge Recommendation: Home with family support    Shira Lowe, [address]

## 2018-02-14 NOTE — OCCUPATIONAL THERAPY NOTE
Occupational Therapy Treatment Note:     02/14/18 1130   Restrictions/Precautions   Weight Bearing Precautions Per Order No   Pain Assessment   Pain Score No Pain   ADL   Where Assessed Chair   LB Dressing Assistance 5  Supervision/Setup   LB Dressing Deficit Setup;Supervision/safety; Increased time to complete; Don/doff R sock; Don/doff L sock; Thread RLE into pants; Thread LLE into pants;Pull up over hips   LB Dressing Comments No AD, SBA, no LOB in stance   Transfers   Sit to Stand 6  Modified independent   Additional items Armrests   Stand to Sit 6  Modified independent   Additional items Armrests   Cognition   Overall Cognitive Status WFL   Arousal/Participation Alert   Attention Within functional limits   Orientation Level Oriented X4   Memory Within functional limits   Following Commands Follows all commands and directions without difficulty   Comments Seen for extensive education, pt recalls and verbalizes good understanding of same   Activity Tolerance   Activity Tolerance Patient tolerated treatment well   Medical Staff Made Aware NSG aware   Assessment   Assessment Pt was seen this date for OT tx session focusing on LB dressing, and extensive education o home safety and energy consevation  Pt demsontrates ability to don sock and pants with SBA  MOd I for STS and throguout dressig task in stance  Pt was extensivly educaetd on home safety and energy conservation  Pt reports having following DME: WR, bed side commode, shower chair and grab bars  Pt states she has no concerns for home and does not wish to have home health services  t has good understanding of all education provided, handouts given  No immdiate OT services required at this tie as pt has met alll previously established goals   d/c OT    Plan   Treatment Interventions ADL retraining   Goal Expiration Date 02/17/18   OT Frequency 3-5x/wk   Recommendation   OT Discharge Recommendation Home with family support     Fernanda Bonilla

## 2018-02-14 NOTE — DISCHARGE SUMMARY
Discharge Summary - Gracie Esquivel 71 y o  female MRN: [id number]    [facility] Encounter: [id number]    Admission Date: 2/6/2018     Discharge Date: 02/14/18    Admitting Diagnosis: Vomiting [R11 10]  Small bowel obstruction [K56 609]  Nausea and vomiting [R11 2]  Chronic kidney disease (CKD), stage IV (severe) ([facility] ) [N18 4]  Diffuse abdominal pain [R10 84]    Secondary Diagnosis:   Past Medical History:   Diagnosis Date    Anxiety     Chronic kidney disease (CKD), stage IV (severe) ([facility] )     stage IV    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    Hydronephrosis     Hypertension     Hypothyroid     Incontinence     Lung mass     Improving on PET/CT 1/2016    Polycythemia vera ([facility] [address] [facility] )     [facility]    Urinary tract infection        Discharge Diagnosis: Same    Procedures Performed: None    Consults: None    Hospital Course: 71 y o  female who presents with abdominal pain history yesterday  She describes it as a diffuse crampy pain throughout her abdomen that is constant  Associated symptoms include nausea/vomiting, subjective fever/chills, and myalgias  She denies chest pain/shortness of breath, blood in her stool, any change in her ileal conduit output  States that her last bowel movement was sometime yesterday  She has never had a bowel obstruction before  Past surgical history is significant for supra trigonal cystectomy with ileal conduit construction in 2016 with Dr Renetta Solis  She had a prolonged postoperative course including ileus and C diff colitis  Cystectomy was for CKD stage 4/5 secondary to obstructive uropathy from urinary incontinence causing bilateral hydronephrosis refractory to medical management/Botox injections/percutaneous nephrostomy tubes  Other surgical history includes cholecystectomy and IVC filter placement/removal   CT scan shows small-bowel obstruction with transition point in the mid left abdomen       She was admitted on 02/07/2018 with an NG tube for decompression  On hospital day 5, the patient did not have signs of opening up  We placed a PICC line and started her on TPN  She is maintained on heparin infusion as her Eliquis has been held  By 02/14/2018, she was tolerating regular diet was I having pain  She was having regular bowel function  She was discharged  She declined VNA for home PT  She will follow up with the gastroenterologist as needed  She may follow up with surgery as needed  We advised follow up with her primary care doctor in 1-2 weeks to discuss her hospitalization  Disposition: home  Call physician for temperature over 101, wound redness or discharge, vomiting, or intolerance to diet  Discharge Medications:  See after visit summary for reconciled discharge medications provided to patient and family  This text is generated with voice recognition software  There may be translation, syntax,  or grammatical errors  If you have any questions, please contact the dictating provider

## 2018-02-14 NOTE — CASE MANAGEMENT
Notification of Discharge  This is a Notification of Discharge from our facility [address]  Please be advised that this patient has been discharge from our facility  Below you will find the admission and discharge date and time including the patients disposition  PRESENTATION DATE: 2/6/2018 11:25 PM  IP ADMISSION DATE: 2/7/18 0228  DISCHARGE DATE: 2/14/2018  4:30 PM  DISPOSITION: [address] [place] [address] in the [facility] by [address] for 2017  Network Utilization Review Department  Phone: [phone]; Fax [phone]  ATTENTION: The Network Utilization Review Department is now centralized for our 7 Facilities  Make a note that we have a new phone and fax numbers for our Department  Please call with any questions or concerns to [phone] and carefully follow the prompts so that you are directed to the right person  All voicemails are confidential  Fax any determinations, approvals, denials, and requests for initial or continue stay review clinical to [phone]  Due to HIGH CALL volume, it would be easier if you could please send faxed requests to expedite your requests and in part, help us provide discharge notifications faster

## 2018-02-14 NOTE — DISCHARGE INSTRUCTIONS
Bowel Obstruction   WHAT YOU NEED TO KNOW:   A bowel obstruction occurs when your large or small intestine is completely or partly blocked  The blockage prevents food and waste from passing through normally  DISCHARGE INSTRUCTIONS:   Follow up with your healthcare provider as directed:  Write down your questions so you remember to ask them during your visits  Contact your healthcare provider if:   · You have nausea and are vomiting  · Your abdomen is enlarged  · You cannot pass a bowel movement or gas  · You lose weight without trying  · You have blood in your bowel movement  · You have questions or concerns about your condition or care  Return to the emergency department if:   · You have severe abdominal pain that does not get better  · Your heart is beating faster than normal for you  · You have a fever  © 2017 [employer]  Information is for End User's use only and may not be sold, redistributed or otherwise used for commercial purposes  All illustrations and images included in CareNotes® are the copyrighted property of A D A M , Inc  or Venu Delgado  The above information is an  only  It is not intended as medical advice for individual conditions or treatments  Talk to your doctor, nurse or pharmacist before following any medical regimen to see if it is safe and effective for you

## 2018-02-14 NOTE — PROGRESS NOTES
Progress Note - General Surgery   Abilio Faria 71 y o  female MRN: [id number]  [facility] Encounter: [id number]    Assessment:  71 y o  F w/ SBO likely 2/2 adhesions    Plan:  - soft diet  - heparin gtt, restart xarelto if tolerating normal diet later today  - OOB/ambulate  - possible d/c home today        Subjective/Objective     Subjective: flatus and BM  Tolerating clears and crackers  Is hungry, no nausea/vomiting/f/c    Objective:     Blood pressure (!) 178/86, pulse 77, temperature 98 7 °F (37 1 °C), temperature source Oral, resp  rate 18, height 5' (1 524 m), weight 81 6 kg (179 lb 14 3 oz), SpO2 96 %  ,Body mass index is 35 13 kg/m²  Intake/Output Summary (Last 24 hours) at 02/14/18 0626  Last data filed at 02/14/18 0243   Gross per 24 hour   Intake          2916 98 ml   Output             1875 ml   Net          1041 98 ml       Invasive Devices     Peripherally Inserted Central Catheter Line            PICC Line 99/41/50 Left Cephalic 1 day          Drain            Urostomy Ileal conduit  days                Physical Exam:   NAD  Norm resp effort  RRR  Abd soft, NT/ND  -c/c/e      Lab, Imaging and other studies:I have personally reviewed pertinent lab results    , CBC:   No results found for: WBC, HGB, HCT, MCV, PLT, ADJUSTEDWBC, MCH, MCHC, RDW, MPV, NRBC, CMP:   Lab Results   Component Value Date     02/13/2018    K 4 3 02/13/2018     (H) 02/13/2018    CO2 23 02/13/2018    ANIONGAP 7 02/13/2018    BUN 15 02/13/2018    CREATININE 2 07 (H) 02/13/2018    GLUCOSE 150 (H) 02/13/2018    CALCIUM 7 7 (L) 02/13/2018    EGFR 24 02/13/2018     VTE Pharmacologic Prophylaxis: Heparin  VTE Mechanical Prophylaxis: sequential compression device

## 2018-03-08 DIAGNOSIS — N18.4 CKD (CHRONIC KIDNEY DISEASE), STAGE IV (HCC): Primary | ICD-10-CM

## 2018-03-08 RX ORDER — SODIUM BICARBONATE 650 MG/1
650 TABLET ORAL 2 TIMES DAILY
Qty: 60 TABLET | Refills: 3 | Status: SHIPPED | OUTPATIENT
Start: 2018-03-08 | End: 2018-07-09 | Stop reason: SDUPTHER

## 2018-03-16 DIAGNOSIS — N18.4 CHRONIC KIDNEY DISEASE, STAGE IV (SEVERE) (HCC): Primary | ICD-10-CM

## 2018-03-19 RX ORDER — CALCITRIOL 0.25 UG/1
0.25 CAPSULE, LIQUID FILLED ORAL DAILY
Qty: 90 CAPSULE | Refills: 2 | Status: SHIPPED | OUTPATIENT
Start: 2018-03-19 | End: 2018-10-05 | Stop reason: DRUGHIGH

## 2018-03-20 DIAGNOSIS — D45 POLYCYTHEMIA VERA (HCC): Primary | ICD-10-CM

## 2018-04-05 ENCOUNTER — APPOINTMENT (OUTPATIENT)
Dept: LAB | Facility: HOSPITAL | Age: 73
End: 2018-04-05
Attending: INTERNAL MEDICINE
Payer: COMMERCIAL

## 2018-04-05 DIAGNOSIS — D45 POLYCYTHEMIA VERA (HCC): ICD-10-CM

## 2018-04-05 LAB
ALBUMIN SERPL BCP-MCNC: 3.3 G/DL (ref 3.5–5)
ALP SERPL-CCNC: 79 U/L (ref 46–116)
ALT SERPL W P-5'-P-CCNC: 10 U/L (ref 12–78)
ANION GAP SERPL CALCULATED.3IONS-SCNC: 8 MMOL/L (ref 4–13)
AST SERPL W P-5'-P-CCNC: 18 U/L (ref 5–45)
BASOPHILS # BLD AUTO: 0.1 THOUSANDS/ΜL (ref 0–0.1)
BASOPHILS NFR BLD AUTO: 2 % (ref 0–1)
BILIRUB SERPL-MCNC: 0.47 MG/DL (ref 0.2–1)
BUN SERPL-MCNC: 41 MG/DL (ref 5–25)
CALCIUM SERPL-MCNC: 8.4 MG/DL (ref 8.3–10.1)
CHLORIDE SERPL-SCNC: 112 MMOL/L (ref 100–108)
CO2 SERPL-SCNC: 20 MMOL/L (ref 21–32)
CREAT SERPL-MCNC: 2.69 MG/DL (ref 0.6–1.3)
EOSINOPHIL # BLD AUTO: 0.11 THOUSAND/ΜL (ref 0–0.61)
EOSINOPHIL NFR BLD AUTO: 2 % (ref 0–6)
ERYTHROCYTE [DISTWIDTH] IN BLOOD BY AUTOMATED COUNT: 15.1 % (ref 11.6–15.1)
GFR SERPL CREATININE-BSD FRML MDRD: 17 ML/MIN/1.73SQ M
GLUCOSE SERPL-MCNC: 87 MG/DL (ref 65–140)
HCT VFR BLD AUTO: 33.5 % (ref 34.8–46.1)
HGB BLD-MCNC: 11.2 G/DL (ref 11.5–15.4)
LYMPHOCYTES # BLD AUTO: 1.25 THOUSANDS/ΜL (ref 0.6–4.47)
LYMPHOCYTES NFR BLD AUTO: 23 % (ref 14–44)
MCH RBC QN AUTO: 29.7 PG (ref 26.8–34.3)
MCHC RBC AUTO-ENTMCNC: 33.4 G/DL (ref 31.4–37.4)
MCV RBC AUTO: 89 FL (ref 82–98)
MONOCYTES # BLD AUTO: 0.28 THOUSAND/ΜL (ref 0.17–1.22)
MONOCYTES NFR BLD AUTO: 5 % (ref 4–12)
NEUTROPHILS # BLD AUTO: 3.57 THOUSANDS/ΜL (ref 1.85–7.62)
NEUTS SEG NFR BLD AUTO: 68 % (ref 43–75)
NRBC BLD AUTO-RTO: 0 /100 WBCS
PLATELET # BLD AUTO: 299 THOUSANDS/UL (ref 149–390)
PMV BLD AUTO: 9.9 FL (ref 8.9–12.7)
POTASSIUM SERPL-SCNC: 3.9 MMOL/L (ref 3.5–5.3)
PROT SERPL-MCNC: 6.8 G/DL (ref 6.4–8.2)
RBC # BLD AUTO: 3.77 MILLION/UL (ref 3.81–5.12)
SODIUM SERPL-SCNC: 140 MMOL/L (ref 136–145)
WBC # BLD AUTO: 5.34 THOUSAND/UL (ref 4.31–10.16)

## 2018-04-05 PROCEDURE — 36415 COLL VENOUS BLD VENIPUNCTURE: CPT

## 2018-04-05 PROCEDURE — 85025 COMPLETE CBC W/AUTO DIFF WBC: CPT

## 2018-04-05 PROCEDURE — 80053 COMPREHEN METABOLIC PANEL: CPT

## 2018-04-24 ENCOUNTER — DOCUMENTATION (OUTPATIENT)
Dept: HEMATOLOGY ONCOLOGY | Facility: CLINIC | Age: 73
End: 2018-04-24

## 2018-04-24 ENCOUNTER — OFFICE VISIT (OUTPATIENT)
Dept: HEMATOLOGY ONCOLOGY | Facility: CLINIC | Age: 73
End: 2018-04-24
Payer: COMMERCIAL

## 2018-04-24 VITALS
DIASTOLIC BLOOD PRESSURE: 78 MMHG | SYSTOLIC BLOOD PRESSURE: 130 MMHG | OXYGEN SATURATION: 98 % | HEIGHT: 60 IN | RESPIRATION RATE: 14 BRPM | HEART RATE: 70 BPM | BODY MASS INDEX: 36.71 KG/M2 | TEMPERATURE: 98.3 F | WEIGHT: 187 LBS

## 2018-04-24 DIAGNOSIS — D75.1 POLYCYTHEMIA: Primary | ICD-10-CM

## 2018-04-24 DIAGNOSIS — N18.4 CHRONIC KIDNEY DISEASE (CKD), STAGE IV (SEVERE) (HCC): ICD-10-CM

## 2018-04-24 PROCEDURE — 99215 OFFICE O/P EST HI 40 MIN: CPT | Performed by: PHYSICIAN ASSISTANT

## 2018-04-24 RX ORDER — LEVOTHYROXINE SODIUM 0.07 MG/1
75 TABLET ORAL DAILY
Refills: 3 | COMMUNITY
Start: 2018-02-11 | End: 2021-08-19 | Stop reason: SDUPTHER

## 2018-04-24 RX ORDER — AMLODIPINE BESYLATE 10 MG/1
10 TABLET ORAL DAILY
COMMUNITY
Start: 2018-04-23 | End: 2019-01-14 | Stop reason: HOSPADM

## 2018-04-24 NOTE — PROGRESS NOTES
Hematology Outpatient Follow - Up Note  Laura Rivera 71 y o  female MRN: @ Encounter: [id number]        Date:  4/24/2018    Assessment/ Plan:      Polycythemia vera positive for JAK2 mutation, on ruxolitinib 10 mg p o  daily since July 2016 with excellent tolerance, disappearance of splenomegaly, improvement of constitutional symptoms   Labs remain stable  continue treatment  Follow-up in 6 months with CBC, CMP      2   History of PE, massive, on apixaban 2 5 mg p o  daily because of chronic kidney disease grade 3-4, no evidence of recurrent DVT or PE    3  Weight gain  Trying to loose through diet modifications  4   CKD  Cr stable  Daily oral hydration encouraged  HPI:    #1  History of polycythemia vera diagnosed in 2006 with thrombocytosis, erythrocytosis, when she presented with hemoglobin of 20 6, hematocrit 54  Positive for CLARE 2 mutation diagnosis was done at [facility], on hydroxyurea  She also had phlebotomy previously  #2  Chronic left lower extremity deep venous thrombosis diagnosed in 2007, treated with Coumadin  #3  Large pulmonary embolus in the right upper lobe branches, currently on Coumadin to keep INR between 2 and 3 managed by Dr Alton Gonzalez  7/19/17: Currently on apixaban 2 5 mg by mouth daily,  7/19/2017: Status post cystectomy with ileal conduit 2nd to her chronic hydronephrosis  Status post removal of IVC filter 12/2016  #4  Because of constitutional symptoms, fatigue, pruritus, switching patient initiated on Ruxolitinib 10 mg by mouth twice a day in June 2016, she underwent bladder resection with ileostomy, currently on Arixtra 10 80 mg p o  daily       Interval History:   Admitted February 6th through 2/14/2018  for crampy abdominal pain associated with nausea/vomiting, subjective fever/chills, and myalgias  SBO was identified  She was treated conservatively  Noncontrast CT of her abdomen and pelvis 2/7/2018 noted that the spleen was unremarkable  4/5/2018 hemoglobin 11 2, MCV of 89, white blood cell count 5 34 with 60% neutrophils, 23% lymphocytes, 5% monocytes  Platelet count 179   passed in January 2018  Chronically tired  Believes worse since her hospitalization  Current Treatment: Ruxolitinib 10 mg by mouth daily initiated in January 2016        Test Results:      Labs:   Lab Results   Component Value Date    HGB 11 2 (L) 04/05/2018    HCT 33 5 (L) 04/05/2018    MCV 89 04/05/2018     04/05/2018    WBC 5 34 04/05/2018    NRBC 0 04/05/2018    BANDSPCT 1 07/14/2017    ATYLMPCT 1 (H) 10/25/2016     Lab Results   Component Value Date     04/05/2018    K 3 9 04/05/2018     (H) 04/05/2018    CO2 20 (L) 04/05/2018    ANIONGAP 8 04/05/2018    BUN 41 (H) 04/05/2018    CREATININE 2 69 (H) 04/05/2018    GLUCOSE 87 04/05/2018    GLUF 85 01/23/2018    CALCIUM 8 4 04/05/2018    AST 18 04/05/2018    ALT 10 (L) 04/05/2018    ALKPHOS 79 04/05/2018    PROT 6 8 04/05/2018    BILITOT 0 47 04/05/2018    EGFR 17 04/05/2018           Lab Results   Component Value Date    IRON 21 (L) 01/29/2016    TIBC 204 (L) 01/29/2016    FERRITIN 349 01/29/2016           Imaging: No results found  ROS:   As mentioned in HPI & Interval History otherwise 14 point ROS negative  Allergies: No Known Allergies  Current Medications: Reviewed  PMH/FH/SH:  Reviewed      Physical Exam:    There is no height or weight on file to calculate BSA      Wt Readings from Last 3 Encounters:   02/07/18 81 6 kg (179 lb 14 3 oz)   10/17/17 78 kg (172 lb)   07/18/17 73 9 kg (163 lb)        Temp Readings from Last 3 Encounters:   02/14/18 99 °F (37 2 °C) (Oral)   10/17/17 (!) 97 °F (36 1 °C)   07/18/17 99 °F (37 2 °C)        BP Readings from Last 3 Encounters:   02/14/18 138/83   10/17/17 130/80   07/18/17 130/80           Constitutional   General appearance: No acute distress, well appearing and well nourished     Eyes   Conjunctiva and lids: No swelling, erythema or discharge     Pupils and irises: Equal, round     Ears, Nose, Mouth, and Throat   External inspection of ears and nose: Normal     Nasal mucosa, septum, and turbinates: Normal without edema or erythema     Oropharynx: Normal with no erythema, edema, exudate or lesions     Pulmonary   Respiratory effort: No increased work of breathing or signs of respiratory distress     Auscultation of lungs: Clear to auscultation     Cardiovascular   Palpation of heart: Normal PMI, no thrills     Auscultation of heart: Normal rate and rhythm, normal S1 and S2, without murmurs      Abdomen   Abdomen: Non-tender, no masses   Ileal conduit  Liver and spleen: No hepatomegaly or splenomegaly     Lymphatic   Palpation of lymph nodes in neck: No lymphadenopathy     Musculoskeletal   Gait and station: Normal     Digits and nails: Normal without clubbing or cyanosis     Inspection/palpation of joints, bones, and muscles: Arthritic changes  Skin   Skin and subcutaneous tissue: Normal without rashes or lesions     Neurologic   Cranial nerves: Cranial nerves 2-12 grossly intact     Sensation: No sensory loss     Psychiatric   Orientation to person, place, and time: Normal     Mood and affect: Normal         Goals and Barriers:  Current Goal:     Patient's Capacity to Self Care:  Patient is able to self care

## 2018-04-25 ENCOUNTER — TRANSCRIBE ORDERS (OUTPATIENT)
Dept: LAB | Facility: HOSPITAL | Age: 73
End: 2018-04-25

## 2018-04-25 ENCOUNTER — APPOINTMENT (OUTPATIENT)
Dept: LAB | Facility: HOSPITAL | Age: 73
End: 2018-04-25
Payer: COMMERCIAL

## 2018-04-25 DIAGNOSIS — R53.83 OTHER FATIGUE: Primary | ICD-10-CM

## 2018-04-25 LAB — TSH SERPL DL<=0.05 MIU/L-ACNC: 2.85 UIU/ML (ref 0.36–3.74)

## 2018-04-25 PROCEDURE — 36415 COLL VENOUS BLD VENIPUNCTURE: CPT

## 2018-04-25 PROCEDURE — 84443 ASSAY THYROID STIM HORMONE: CPT

## 2018-05-14 ENCOUNTER — TELEPHONE (OUTPATIENT)
Dept: UROLOGY | Facility: HOSPITAL | Age: 73
End: 2018-05-14

## 2018-05-17 ENCOUNTER — OFFICE VISIT (OUTPATIENT)
Dept: NEPHROLOGY | Facility: CLINIC | Age: 73
End: 2018-05-17
Payer: COMMERCIAL

## 2018-05-17 VITALS
BODY MASS INDEX: 37.11 KG/M2 | SYSTOLIC BLOOD PRESSURE: 138 MMHG | WEIGHT: 189 LBS | HEIGHT: 60 IN | DIASTOLIC BLOOD PRESSURE: 82 MMHG | HEART RATE: 74 BPM

## 2018-05-17 DIAGNOSIS — K56.609 SMALL BOWEL OBSTRUCTION (HCC): ICD-10-CM

## 2018-05-17 DIAGNOSIS — N13.9 OBSTRUCTIVE UROPATHY: ICD-10-CM

## 2018-05-17 DIAGNOSIS — N18.4 CKD (CHRONIC KIDNEY DISEASE) STAGE 4, GFR 15-29 ML/MIN (HCC): Primary | Chronic | ICD-10-CM

## 2018-05-17 PROCEDURE — 99214 OFFICE O/P EST MOD 30 MIN: CPT | Performed by: INTERNAL MEDICINE

## 2018-05-17 NOTE — PATIENT INSTRUCTIONS
I would like to see you back in the office in 4-5 months with repeat labs  Remember to follow a low-salt diet  Remember to avoid NSAIDs  Remember to save your nondominant arm ( left arm ) in case of needed in the future for dialysis, no intravenously lines or blood draws over the left arm  Continues follow-up with Urology, Hematology and your family doctor  Recommend to follow a healthy lifestyle including following a low-salt diet, avoid smoking, do regular physical activity, avoid anti-inflammatory/NSAIDs medication (no ibuprofen, Motrin, Advil, naproxen), keep a good weight and to continue with regular follow-up as instructed  Hypertension and diabetes are the two most common causes of chronic kidney disease (CKD), if you have any of them, it is important to have a good blood sugar (hemoglobin A1c less than 7 percent) as well as good blood pressure control to slow down the progression of your CKD

## 2018-05-17 NOTE — LETTER
May 17, 2018     Chris Cason MD  [address] [place], [place] [address]    Patient: Jose Parson   YOB: 1945   Date of Visit: 5/17/2018       Dear Dr Anderson Senior: Thank you for referring Ciprianopadmini Monique to me for evaluation  Below are my notes for this consultation  If you have questions, please do not hesitate to call me  I look forward to following your patient along with you  Sincerely,        Beni Eden MD        CC: No Recipients  Beni Eden MD  5/17/2018 12:01 PM  Sign at close encounter  OFFICE FOLLOW UP - Nephrology   Jose Parson 71 y o  female MRN: [id number]       ASSESSMENT and PLAN:  Vicki Aguilar was seen today for follow-up  Diagnoses and all orders for this visit:    CKD (chronic kidney disease) stage 4, GFR 15-29 ml/min ([facility])  -     Renal function panel; Future  -     PTH, intact; Future  -     CBC; Future    Obstructive uropathy    Small bowel obstruction ([facility] [address] )        This 52-year-old lady with known history of chronic kidney disease stage 4 previous creatinine on the high 2s to low threes, history of urinary incontinence, bilateral hydronephrosis, previously worsening renal function secondary to obstructive uropathy and 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 on October 2016 who returns to the office for follow-up  1  Chronic kidney disease stage 4/5 secondary to previous obstructive uropathy  Most recent blood test renal function fairly stable with a serum creatinine of 2 69 on April 2018  I would like to see her back in the office in around 5 months with repeat labs  Advised about avoiding NSAIDs  No changes on her medications at this moment  Discussed again about importance of trying to save nondominant arm (left arm for dialysis if needed in the future, no intravenously lines or blood draws over the left arm  Went to [facility]      2  Hypertension, recently her family doctor increased her amlodipine and today her blood pressure is better  Continue with a low-salt diet and same antihypertensive medications  3  History of nonfunctional bladder causing bilateral hydronephrosis status postsupratrigonal cystectomy and ileal conduit, continues follow-up with urologist Dr Kati Gonsales  4  Polycythemia vera and previous history of PE, continues following with Hematology Dr Cleo Abbott, on Eliquis as per hematologist     5  Mineral bone disease, will check phosphorus and PTH in 5 months  There are no Patient Instructions on file for this visit  HPI: Eliu Khanna is a 71 y o  female who is here for Follow-up    Last time seen was in July 2007, unfortunately due to several events she was not seen 6 months after  Today she returns to the office for follow-up  Since our last visit, unfortunately her  passed away in January of this year, she was then hospitalized in February 2018 due to small-bowel obstruction  Patient currently feeling more tired, not sleeping well, wondering is she is depressed  Patient denies any chest pain, shortness of breath and swelling  The last blood work was done on 04/05/2018, which we have reviewed together  Continue with chronic knees pain, appetite is OK  Her PCP increased amlodipine to 10 mg 1 tab daily around March this year  No fever or chills recently  ROS:   All the systems were reviewed and were negative except as documented on the HPI  Allergies: Patient has no known allergies      Medications:   Current Outpatient Prescriptions:     amLODIPine (NORVASC) 10 mg tablet, Take 10 mg by mouth daily  , Disp: , Rfl:     apixaban (ELIQUIS) 2 5 mg, Take 1 tablet by mouth daily, Disp: , Rfl:     calcitriol (ROCALTROL) 0 25 mcg capsule, Take 1 capsule (0 25 mcg total) by mouth daily, Disp: 90 capsule, Rfl: 2    Cholecalciferol (VITAMIN D3) 1000 UNITS CAPS, Take 1,000 unit marking on U-100 syringe by mouth daily , Disp: , Rfl:     JAKAFI 10 MG tablet, TAKE 1 TABLET BY MOUTH ONE TIME DAILY  , Disp: 30 tablet, Rfl: 5    levothyroxine 75 mcg tablet, Take 75 mcg by mouth daily, Disp: , Rfl: 3    metoprolol tartrate (LOPRESSOR) 25 mg tablet, Take 0 5 tablets by mouth 2 (two) times a day, Disp: , Rfl:     saccharomyces boulardii (FLORASTOR) 250 mg capsule, Take 1 capsule by mouth daily  , Disp: , Rfl:     sodium bicarbonate 650 mg tablet, Take 1 tablet (650 mg total) by mouth 2 (two) times a day, Disp: 60 tablet, Rfl: 3    WELCHOL 625 MG tablet, TAKE 1 TABLET AT BEDTIME AT 8PM, Disp: , Rfl: 2    levothyroxine 75 mcg tablet, Take 1 tablet by mouth daily in the early morning for 30 days, Disp: 30 tablet, Rfl: 0    Past Medical History:   Diagnosis Date    Anxiety     Chronic kidney disease (CKD), stage IV (severe) (HCC)     stage IV    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    Hydronephrosis     Hypertension     Hypothyroid     Incontinence     Lung mass     Improving on PET/CT 1/2016    [facility]mia vera [facility]     Pulmonary embolism ([facility] ) 2014    Urinary tract infection      Past Surgical History:   Procedure Laterality Date    BLADDER SUSPENSION      BOTOX INJECTION N/A 7/27/2016    Procedure: BOTOX INJECTION ;  Surgeon: Jorge A Silverman MD;  [facility];  Service:     CHOLECYSTECTOMY N/A     COLONOSCOPY      CYSTECTOMY, RADICAL WITH ILEOCONDUIT N/A 10/4/2016    Procedure: SUPRATRIGONAL CYSTECTOMY WITH ILEAL CONDUIT ;  Surgeon: Jorge A Silverman MD;  [facility];  Service:    Toshia Alvarez W/ RETROGRADES Bilateral 7/27/2016    Procedure: Orlando Augustin; RETROGRADE PYELOGRAM ;  Surgeon: Jorge A Silverman MD;  [facility];  Service:     AL COLONOSCOPY FLX DX W/COLLJ SPEC WHEN PFRMD N/A 8/31/2016    Procedure: COLONOSCOPY;  Surgeon: Denise Olvera MD;  Location: [facility];   Service: Gastroenterology    AL CYSTOSCOPY,INSERT URETERAL STENT Bilateral 7/27/2016    Procedure: STENT INSERTION; EXCISION OF MESH ;  Surgeon: Sarbjit Conner MD;  [facility];  Service: Urology    TONSILLECTOMY      TUBAL LIGATION      WISDOM TOOTH EXTRACTION       Family History   Problem Relation Age of Onset    No Known Problems Mother     No Known Problems Father       reports that she has never smoked  She has never used smokeless tobacco  She reports that she drinks alcohol  She reports that she does not use drugs  Physical Exam:   Vitals:    05/17/18 1119   BP: 138/82   BP Location: Left arm   Patient Position: Sitting   Pulse: 74   Weight: 85 7 kg (189 lb)   Height: 5' (1 524 m)     Body mass index is 36 91 kg/m²  General: cooperative, in not acute distress  Eyes: conjunctivae pink, anicteric sclerae  ENT: lips and mucous membranes moist  Neck: supple, no JVD  Chest: clear breath sounds bilateral, no crackles, ronchus or wheezings  CVS: distinct S1 & S2, normal rate, regular rhythm  Abdomen: soft, non-tender, non-distended, normoactive bowel sounds, ileo conduit over RLQ  Extremities: no edema of both legs  Skin: no rash  Neuro: awake, alert, oriented        Portions of the record may have been created with voice recognition software  Occasional wrong word or "sound a like" substitutions may have occurred due to the inherent limitations of voice recognition software  Read the chart carefully and recognize, using context, where substitutions have occurred  If you have any questions, please contact the dictating provider

## 2018-05-17 NOTE — PROGRESS NOTES
OFFICE FOLLOW UP - Nephrology   Jose Parson 71 y o  female MRN: [id number]       ASSESSMENT and PLAN:  Vicki Aguilar was seen today for follow-up  Diagnoses and all orders for this visit:    CKD (chronic kidney disease) stage 4, GFR 15-29 ml/min ([facility])  -     Renal function panel; Future  -     PTH, intact; Future  -     CBC; Future    Obstructive uropathy    Small bowel obstruction ([address] )        This 67-year-old lady with known history of chronic kidney disease stage 4 previous creatinine on the high 2s to low threes, history of urinary incontinence, bilateral hydronephrosis, previously worsening renal function secondary to obstructive uropathy and 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 on October 2016 who returns to the office for follow-up  1  Chronic kidney disease stage 4/5 secondary to previous obstructive uropathy  Most recent blood test renal function fairly stable with a serum creatinine of 2 69 on April 2018  I would like to see her back in the office in around 5 months with repeat labs  Advised about avoiding NSAIDs  No changes on her medications at this moment  Discussed again about importance of trying to save nondominant arm (left arm for dialysis if needed in the future, no intravenously lines or blood draws over the left arm  Went to [facility]  2  Hypertension, recently her family doctor increased her amlodipine and today her blood pressure is better  Continue with a low-salt diet and same antihypertensive medications  3  History of nonfunctional bladder causing bilateral hydronephrosis status postsupratrigonal cystectomy and ileal conduit, continues follow-up with urologist Dr Ana Laura Pelaez  4  Polycythemia vera and previous history of PE, continues following with Hematology Dr Samara Campbell, on Eliquis as per hematologist     5  Mineral bone disease, will check phosphorus and PTH in 5 months      There are no Patient Instructions on file for this visit  HPI: Sherren Greenspan is a 71 y o  female who is here for Follow-up    Last time seen was in July 2007, unfortunately due to several events she was not seen 6 months after  Today she returns to the office for follow-up  Since our last visit, unfortunately her  passed away in January of this year, she was then hospitalized in February 2018 due to small-bowel obstruction  Patient currently feeling more tired, not sleeping well, wondering is she is depressed  Patient denies any chest pain, shortness of breath and swelling  The last blood work was done on 04/05/2018, which we have reviewed together  Continue with chronic knees pain, appetite is OK  Her PCP increased amlodipine to 10 mg 1 tab daily around March this year  No fever or chills recently  ROS:   All the systems were reviewed and were negative except as documented on the HPI  Allergies: Patient has no known allergies  Medications:   Current Outpatient Prescriptions:     amLODIPine (NORVASC) 10 mg tablet, Take 10 mg by mouth daily  , Disp: , Rfl:     apixaban (ELIQUIS) 2 5 mg, Take 1 tablet by mouth daily, Disp: , Rfl:     calcitriol (ROCALTROL) 0 25 mcg capsule, Take 1 capsule (0 25 mcg total) by mouth daily, Disp: 90 capsule, Rfl: 2    Cholecalciferol (VITAMIN D3) 1000 UNITS CAPS, Take 1,000 unit marking on U-100 syringe by mouth daily  , Disp: , Rfl:     JAKAFI 10 MG tablet, TAKE 1 TABLET BY MOUTH ONE TIME DAILY  , Disp: 30 tablet, Rfl: 5    levothyroxine 75 mcg tablet, Take 75 mcg by mouth daily, Disp: , Rfl: 3    metoprolol tartrate (LOPRESSOR) 25 mg tablet, Take 0 5 tablets by mouth 2 (two) times a day, Disp: , Rfl:     saccharomyces boulardii (FLORASTOR) 250 mg capsule, Take 1 capsule by mouth daily  , Disp: , Rfl:     sodium bicarbonate 650 mg tablet, Take 1 tablet (650 mg total) by mouth 2 (two) times a day, Disp: 60 tablet, Rfl: 3    WELCHOL 625 MG tablet, TAKE 1 TABLET AT BEDTIME AT 8PM, Disp: , Rfl: 2    levothyroxine 75 mcg tablet, Take 1 tablet by mouth daily in the early morning for 30 days, Disp: 30 tablet, Rfl: 0    Past Medical History:   Diagnosis Date    Anxiety     Chronic kidney disease (CKD), stage IV (severe) (HCC)     stage IV    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    Hydronephrosis     Hypertension     Hypothyroid     Incontinence     Lung mass     Improving on PET/CT 1/2016    [facility]     Pulmonary embolism ([facility] ) 2014    Urinary tract infection      Past Surgical History:   Procedure Laterality Date    BLADDER SUSPENSION      BOTOX INJECTION N/A 7/27/2016    Procedure: BOTOX INJECTION ;  Surgeon: Amor Hughes MD;  [facility];  Service:     CHOLECYSTECTOMY N/A     COLONOSCOPY      CYSTECTOMY, RADICAL WITH ILEOCONDUIT N/A 10/4/2016    Procedure: SUPRATRIGONAL CYSTECTOMY WITH ILEAL CONDUIT ;  Surgeon: Amor Hughes MD;  [facility];  Service:    Ryder Gey W/ RETROGRADES Bilateral 7/27/2016    Procedure: Willaim Alu; RETROGRADE PYELOGRAM ;  Surgeon: Amor Hughes MD;  [facility];  Service:     MA COLONOSCOPY FLX DX W/COLLJ SPEC WHEN PFRMD N/A 8/31/2016    Procedure: COLONOSCOPY;  Surgeon: Bayron Knight MD;  [facility]; Service: Gastroenterology    MA CYSTOSCOPY,INSERT URETERAL STENT Bilateral 7/27/2016    Procedure: STENT INSERTION; EXCISION OF MESH ;  Surgeon: Amor Hughes MD;  [facility];  Service: Urology    TONSILLECTOMY      TUBAL LIGATION      WISDOM TOOTH EXTRACTION       Family History   Problem Relation Age of Onset    No Known Problems Mother     No Known Problems Father       reports that she has never smoked  She has never used smokeless tobacco  She reports that she drinks alcohol  She reports that she does not use drugs        Physical Exam:   Vitals:    05/17/18 1119   BP: 138/82   BP Location: Left arm   Patient Position: Sitting   Pulse: 74 Weight: 85 7 kg (189 lb)   Height: 5' (1 524 m)     Body mass index is 36 91 kg/m²  General: cooperative, in not acute distress  Eyes: conjunctivae pink, anicteric sclerae  ENT: lips and mucous membranes moist  Neck: supple, no JVD  Chest: clear breath sounds bilateral, no crackles, ronchus or wheezings  CVS: distinct S1 & S2, normal rate, regular rhythm  Abdomen: soft, non-tender, non-distended, normoactive bowel sounds, ileo conduit over RLQ  Extremities: no edema of both legs  Skin: no rash  Neuro: awake, alert, oriented        Portions of the record may have been created with voice recognition software  Occasional wrong word or "sound a like" substitutions may have occurred due to the inherent limitations of voice recognition software  Read the chart carefully and recognize, using context, where substitutions have occurred  If you have any questions, please contact the dictating provider

## 2018-05-25 ENCOUNTER — TELEPHONE (OUTPATIENT)
Dept: HEMATOLOGY ONCOLOGY | Facility: CLINIC | Age: 73
End: 2018-05-25

## 2018-05-25 NOTE — TELEPHONE ENCOUNTER
She called to find out if we had any Eliquis 2 5 mg samples  She reports she has been getting samples due to the high co-pay  I told her I had one box of 14 tablets in our supply  She said she did not want to make the trip for only one box  She would check with her family doctor to see if they had samples for her

## 2018-06-11 ENCOUNTER — HOSPITAL ENCOUNTER (OUTPATIENT)
Dept: RADIOLOGY | Facility: HOSPITAL | Age: 73
Discharge: HOME | End: 2018-06-11
Attending: UROLOGY
Payer: COMMERCIAL

## 2018-06-11 DIAGNOSIS — I10 ESSENTIAL (PRIMARY) HYPERTENSION: ICD-10-CM

## 2018-06-11 DIAGNOSIS — N18.9 CHRONIC KIDNEY DISEASE: ICD-10-CM

## 2018-06-11 DIAGNOSIS — N18.4 CHRONIC KIDNEY DISEASE, STAGE IV (SEVERE) (HCC): ICD-10-CM

## 2018-06-11 DIAGNOSIS — N13.30 HYDRONEPHROSIS: ICD-10-CM

## 2018-06-11 DIAGNOSIS — N13.30 HYDRONEPHROSIS, UNSPECIFIED HYDRONEPHROSIS TYPE: ICD-10-CM

## 2018-06-11 PROCEDURE — 76770 US EXAM ABDO BACK WALL COMP: CPT

## 2018-06-19 NOTE — PROGRESS NOTES
6/20/2018      Chief Complaint   Patient presents with    Hydronephrosis       Assessment and Plan    68 y o  female managed by Dr Penny Duque    1  Supratrigonal cystectomy and ileal conduit for nonfunctional bladder causing bilateral hydronephrosis  - Cr elevated, continue with nephrology  - U/S with improved hydro  - repeat U/S 1 year    2  Possible punctate calculi  -  U/S showed a possible 2mm calculi  - stressed proper hydration and decreased coffee, tea, and soda consumption   - repeat U/S in 1 year      History of Present Illness  Jorge Alberto Hutton is a 68 y o  female here for follow up evaluation of supratrigonal cystectomy and ileal conduit for nonfunctional bladder causing bilateral hydronephrosis  Cr 2 69  Continues with nephrology  U/S obtained and reveals improving hydronephrosis and a possible punctate calculi measuring 2mm in the right kidney  Patient admits to occasional stomal bleeding but no hematuria  Review of Systems   Constitutional: Negative for activity change and appetite change  Gastrointestinal: Negative for abdominal distention and abdominal pain  Musculoskeletal: Negative for back pain and gait problem  Psychiatric/Behavioral: Negative for behavioral problems and confusion         Past Medical History  Past Medical History:   Diagnosis Date    Anxiety     Chronic kidney disease (CKD), stage IV (severe) (HCC)     stage IV    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    Hydronephrosis     Hypertension     Hypothyroid     Incontinence     Lung mass     Improving on PET/CT 1/2016    Polycythemia vera ([address] )     Pulmonary embolism ([facility] [facility] ) 2014    Urinary tract infection        Past Social History  Past Surgical History:   Procedure Laterality Date    BLADDER SUSPENSION      BOTOX INJECTION N/A 7/27/2016    Procedure: BOTOX INJECTION ;  Surgeon: Daphne Hyatt MD;  [facility];  Service:     CHOLECYSTECTOMY N/A     COLONOSCOPY      CYSTECTOMY, RADICAL WITH ILEOCONDUIT N/A 10/4/2016    Procedure: SUPRATRIGONAL CYSTECTOMY WITH ILEAL CONDUIT ;  Surgeon: Mirza Amaro MD;  [facility];  Service:    [facility] CYSTOSCOPY W/ RETROGRADES Bilateral 7/27/2016    Procedure: Vishal Goldberg; RETROGRADE PYELOGRAM ;  Surgeon: Mirza Amaro MD;  [facility];  Service:     NC COLONOSCOPY FLX DX W/COLLJ [address] PFRMD N/A 8/31/2016    Procedure: COLONOSCOPY;  Surgeon: Dejan Chen MD;  [facility]; Service: Gastroenterology    NC CYSTOSCOPY,INSERT URETERAL STENT Bilateral 7/27/2016    Procedure: STENT INSERTION; EXCISION OF MESH ;  Surgeon: Mirza Amaro MD;  [facility];  Service: Urology    TONSILLECTOMY      TUBAL LIGATION      WISDOM TOOTH EXTRACTION       History   Smoking Status    Never Smoker   Smokeless Tobacco    Never Used       Past Family History  Family History   Problem Relation Age of Onset    No Known Problems Mother     No Known Problems Father        Past Social history  Social History     Social History    Marital status:      Spouse name: N/A    Number of children: N/A    Years of education: N/A     Occupational History    Not on file  Social History Main Topics    Smoking status: Never Smoker    Smokeless tobacco: Never Used    Alcohol use Yes      Comment: occasionally    Drug use: No    Sexual activity: No     Other Topics Concern    Not on file     Social History Narrative    No narrative on file       Current Medications  Current Outpatient Prescriptions   Medication Sig Dispense Refill    amLODIPine (NORVASC) 10 mg tablet Take 10 mg by mouth daily        apixaban (ELIQUIS) 2 5 mg Take 1 tablet by mouth daily      calcitriol (ROCALTROL) 0 25 mcg capsule Take 1 capsule (0 25 mcg total) by mouth daily 90 capsule 2    Cholecalciferol (VITAMIN D3) 1000 UNITS CAPS Take 1,000 unit marking on U-100 syringe by mouth daily        citalopram (CeleXA) 20 mg tablet       JAKAFI 10 MG tablet TAKE 1 TABLET BY MOUTH ONE TIME DAILY  30 tablet 5    levothyroxine 75 mcg tablet Take 75 mcg by mouth daily  3    metoprolol tartrate (LOPRESSOR) 25 mg tablet Take 0 5 tablets by mouth 2 (two) times a day      saccharomyces boulardii (FLORASTOR) 250 mg capsule Take 1 capsule by mouth daily        sodium bicarbonate 650 mg tablet Take 1 tablet (650 mg total) by mouth 2 (two) times a day 60 tablet 3    WELCHOL 625 MG tablet TAKE 1 TABLET AT BEDTIME AT 8PM  2    levothyroxine 75 mcg tablet Take 1 tablet by mouth daily in the early morning for 30 days 30 tablet 0     No current facility-administered medications for this visit  Allergies  No Known Allergies      The following portions of the patient's history were reviewed and updated as appropriate: allergies, current medications, past medical history, past social history, past surgical history and problem list       Vitals  Vitals:    06/20/18 0915 06/20/18 0918   BP: 150/90 130/80   BP Location: Left arm Right arm   Patient Position: Sitting Sitting   Cuff Size: Adult Adult   Pulse: 78 78   Weight: 85 7 kg (189 lb)    Height: 5' (1 524 m)        Physical Exam  Constitutional   General appearance: Patient is seated and in no acute distress, well appearing and well nourished  Head and Face   Head and face: Normal     Eyes   Conjunctiva and lids: No erythema, swelling or discharge  Ears, Nose, Mouth, and Throat   Hearing: Normal     Pulmonary   Respiratory effort: No increased work of breathing or signs of respiratory distress  Cardiovascular   Examination of extremities for edema and/or varicosities: Normal     Abdomen   Abdomen: Non-tender, no masses  Stoma health pink and draining clear yellow urine   Musculoskeletal   Gait and station: Normal     Skin   Skin and subcutaneous tissue: Warm, dry, and intact  No visible lesions or rashes    Psychiatric   Judgment and insight: Normal  Recent and remote memory:  Normal  Mood and affect: Normal      Results  No results found for this or any previous visit (from the past 1 hour(s))  ]  No results found for: PSA  Lab Results   Component Value Date    GLUCOSE 87 04/05/2018    CALCIUM 8 4 04/05/2018     04/05/2018    K 3 9 04/05/2018    CO2 20 (L) 04/05/2018     (H) 04/05/2018    BUN 41 (H) 04/05/2018    CREATININE 2 69 (H) 04/05/2018     Lab Results   Component Value Date    WBC 5 34 04/05/2018    HGB 11 2 (L) 04/05/2018    HCT 33 5 (L) 04/05/2018    MCV 89 04/05/2018     04/05/2018       Orders  No orders of the defined types were placed in this encounter

## 2018-06-20 ENCOUNTER — OFFICE VISIT (OUTPATIENT)
Dept: UROLOGY | Facility: AMBULATORY SURGERY CENTER | Age: 73
End: 2018-06-20
Payer: COMMERCIAL

## 2018-06-20 VITALS
DIASTOLIC BLOOD PRESSURE: 80 MMHG | HEIGHT: 60 IN | WEIGHT: 189 LBS | SYSTOLIC BLOOD PRESSURE: 130 MMHG | BODY MASS INDEX: 37.11 KG/M2 | HEART RATE: 78 BPM

## 2018-06-20 DIAGNOSIS — Z90.6 H/O PARTIAL CYSTECTOMY: Primary | ICD-10-CM

## 2018-06-20 DIAGNOSIS — Z90.6 HISTORY OF TOTAL CYSTECTOMY: ICD-10-CM

## 2018-06-20 PROCEDURE — 99213 OFFICE O/P EST LOW 20 MIN: CPT | Performed by: PHYSICIAN ASSISTANT

## 2018-06-20 RX ORDER — CITALOPRAM 20 MG/1
20 TABLET ORAL DAILY
COMMUNITY
Start: 2018-05-30 | End: 2021-08-19 | Stop reason: SDUPTHER

## 2018-07-09 DIAGNOSIS — N18.4 CKD (CHRONIC KIDNEY DISEASE), STAGE IV (HCC): ICD-10-CM

## 2018-07-09 RX ORDER — SODIUM BICARBONATE 650 MG/1
650 TABLET ORAL 2 TIMES DAILY
Qty: 60 TABLET | Refills: 5 | Status: SHIPPED | OUTPATIENT
Start: 2018-07-09 | End: 2019-01-17 | Stop reason: SDUPTHER

## 2018-08-24 DIAGNOSIS — D45 POLYCYTHEMIA VERA (HCC): ICD-10-CM

## 2018-08-27 RX ORDER — RUXOLITINIB 10 MG/1
TABLET ORAL
Qty: 30 TABLET | Refills: 5 | Status: SHIPPED | OUTPATIENT
Start: 2018-08-27 | End: 2019-02-13 | Stop reason: SDUPTHER

## 2018-09-28 ENCOUNTER — APPOINTMENT (OUTPATIENT)
Dept: LAB | Facility: HOSPITAL | Age: 73
End: 2018-09-28
Attending: INTERNAL MEDICINE
Payer: COMMERCIAL

## 2018-09-28 DIAGNOSIS — N18.4 CHRONIC KIDNEY DISEASE, STAGE IV (SEVERE) (HCC): ICD-10-CM

## 2018-09-28 DIAGNOSIS — N18.4 CKD (CHRONIC KIDNEY DISEASE) STAGE 4, GFR 15-29 ML/MIN (HCC): Chronic | ICD-10-CM

## 2018-09-28 DIAGNOSIS — N18.9 CHRONIC KIDNEY DISEASE: ICD-10-CM

## 2018-09-28 DIAGNOSIS — I10 ESSENTIAL (PRIMARY) HYPERTENSION: ICD-10-CM

## 2018-09-28 LAB
ALBUMIN SERPL BCP-MCNC: 3.5 G/DL (ref 3.5–5)
ANION GAP SERPL CALCULATED.3IONS-SCNC: 9 MMOL/L (ref 4–13)
BUN SERPL-MCNC: 31 MG/DL (ref 5–25)
CALCIUM SERPL-MCNC: 8.8 MG/DL (ref 8.3–10.1)
CHLORIDE SERPL-SCNC: 109 MMOL/L (ref 100–108)
CO2 SERPL-SCNC: 21 MMOL/L (ref 21–32)
CREAT SERPL-MCNC: 2.51 MG/DL (ref 0.6–1.3)
ERYTHROCYTE [DISTWIDTH] IN BLOOD BY AUTOMATED COUNT: 14.6 % (ref 11.6–15.1)
GFR SERPL CREATININE-BSD FRML MDRD: 19 ML/MIN/1.73SQ M
GLUCOSE SERPL-MCNC: 88 MG/DL (ref 65–140)
HCT VFR BLD AUTO: 36.5 % (ref 34.8–46.1)
HGB BLD-MCNC: 12 G/DL (ref 11.5–15.4)
MCH RBC QN AUTO: 29.6 PG (ref 26.8–34.3)
MCHC RBC AUTO-ENTMCNC: 32.9 G/DL (ref 31.4–37.4)
MCV RBC AUTO: 90 FL (ref 82–98)
PHOSPHATE SERPL-MCNC: 2.2 MG/DL (ref 2.3–4.1)
PLATELET # BLD AUTO: 389 THOUSANDS/UL (ref 149–390)
PMV BLD AUTO: 9.9 FL (ref 8.9–12.7)
POTASSIUM SERPL-SCNC: 3.6 MMOL/L (ref 3.5–5.3)
PTH-INTACT SERPL-MCNC: 86.4 PG/ML (ref 18.4–80.1)
RBC # BLD AUTO: 4.05 MILLION/UL (ref 3.81–5.12)
SODIUM SERPL-SCNC: 139 MMOL/L (ref 136–145)
WBC # BLD AUTO: 5.95 THOUSAND/UL (ref 4.31–10.16)

## 2018-09-28 PROCEDURE — 36415 COLL VENOUS BLD VENIPUNCTURE: CPT

## 2018-09-28 PROCEDURE — 80069 RENAL FUNCTION PANEL: CPT

## 2018-09-28 PROCEDURE — 83970 ASSAY OF PARATHORMONE: CPT

## 2018-09-28 PROCEDURE — 85027 COMPLETE CBC AUTOMATED: CPT

## 2018-10-05 ENCOUNTER — OFFICE VISIT (OUTPATIENT)
Dept: NEPHROLOGY | Facility: CLINIC | Age: 73
End: 2018-10-05
Payer: COMMERCIAL

## 2018-10-05 VITALS — WEIGHT: 193.6 LBS | BODY MASS INDEX: 38.01 KG/M2 | HEIGHT: 60 IN

## 2018-10-05 DIAGNOSIS — N13.9 OBSTRUCTIVE UROPATHY: ICD-10-CM

## 2018-10-05 DIAGNOSIS — N25.81 SECONDARY HYPERPARATHYROIDISM OF RENAL ORIGIN (HCC): ICD-10-CM

## 2018-10-05 DIAGNOSIS — I27.82 CHRONIC SADDLE PULMONARY EMBOLISM WITHOUT ACUTE COR PULMONALE (HCC): Chronic | ICD-10-CM

## 2018-10-05 DIAGNOSIS — D75.1 POLYCYTHEMIA: ICD-10-CM

## 2018-10-05 DIAGNOSIS — N18.4 CKD (CHRONIC KIDNEY DISEASE) STAGE 4, GFR 15-29 ML/MIN (HCC): Primary | Chronic | ICD-10-CM

## 2018-10-05 DIAGNOSIS — I26.92 CHRONIC SADDLE PULMONARY EMBOLISM WITHOUT ACUTE COR PULMONALE (HCC): Chronic | ICD-10-CM

## 2018-10-05 PROCEDURE — 99214 OFFICE O/P EST MOD 30 MIN: CPT | Performed by: INTERNAL MEDICINE

## 2018-10-05 RX ORDER — CALCITRIOL 0.25 UG/1
0.25 CAPSULE, LIQUID FILLED ORAL EVERY OTHER DAY
Qty: 15 CAPSULE | Refills: 5 | Status: SHIPPED | OUTPATIENT
Start: 2018-10-05 | End: 2019-10-10 | Stop reason: SDUPTHER

## 2018-10-05 NOTE — LETTER
October 5, 2018     Ozzie Rosenberg MD  [address] [address]    Patient: David Hutton   YOB: 1945   Date of Visit: 10/5/2018       Dear Dr Pitt Handing: Thank you for referring Benedicto Joy to me for evaluation  Below are my notes for this consultation  If you have questions, please do not hesitate to call me  I look forward to following your patient along with you  Sincerely,        Shani Noriega MD        CC: No Recipients  Shani Noriega MD  10/5/2018 11:02 AM  Sign at close encounter  OFFICE FOLLOW UP - Nephrology   David Hutton 68 y o  female MRN: [id number]       ASSESSMENT and PLAN:  Zac Benton was seen today for follow-up  Diagnoses and all orders for this visit:    CKD (chronic kidney disease) stage 4, GFR 15-29 ml/min ([facility])  -     CBC; Future  -     PTH, intact; Future  -     Renal function panel; Future  -     Protein / creatinine ratio, urine; Future    Chronic saddle pulmonary embolism without acute cor pulmonale ([facility])    Obstructive uropathy    Polycythemia    Secondary hyperparathyroidism of renal origin ([facility] )  -     calcitriol (ROCALTROL) 0 25 mcg capsule; Take 1 capsule (0 25 mcg total) by mouth every other day for 30 days Take 1 tablet 3 times a week (Monday, Wednesday, Friday)          67-year-old lady with known history of chronic kidney disease stage 4 with baseline creatinine in the mid to high 2s, history of urinary incontinence, bilateral hydronephrosis secondary to obstructive uropathy and 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 in October 2016 who returns to the office for follow-up  1  Chronic kidney disease stage 4, renal function fairly stable with a most recent creatinine of 2 5 and estimated GFR of 19  Currently she does not have any uremic symptoms  I would like to see her back in the office in 3-4 months with repeat labs      Once again discussed about the symptoms to look for suspected worsening kidney function, she will contact me if she develops any of those symptoms  Avoid NSAIDs  We also discussed above saving the nondominant arm (left arm ) in anticipation of dialysis access in the future  Discussed that if her kidney function continues to decline then will refer her to vascular surgery evaluation for dialysis access placement  She went to [facility] classes in the past       2  Hypertension, blood pressure currently well controlled, no changes on her medications  3  History of nonfunctional bladder causing bilateral hydronephrosis status post supra trigonal cystectomy and ileal conduit, continue follow-up with urologist Dr Snehal Choudhary  4  Polycythemia area, previous history of PE, continues his with Hematology follow-up, Dr Caty Hancock  5  Mineral bone disease, noted PTH now lower, changed Calcitrol to 1 tablet 3 times a week (Monday, Wednesday, Friday) follow labs in 3-4 months  Patient Instructions    I want to see you back in the office in 3-4 months with repeat labs  Remember to keep saving nondominant arm ( left arm ) in anticipation of dialysis access in the future, do not have any intravenously lines or blood draws over the left arm  Decrease Calcitrol to 1 tablet 3 times a week ( Monday, Wednesdays and Fridays)  Follow a low-salt diet  Avoid NSAIDs (no ibuprofen, Motrin, Aleve, Advil, naproxen) okay to take Tylenol as needed for pain  If you developed worsening nausea, vomiting, increased fluid retention with worsening shortness of breath or increased lower extremity swelling, decreased appetite please contact as soon as possible  HPI: Ralph Drake is a 68 y o  female who is here for Follow-up    Last time seen in our office was in May 2018, today she returns to the office for 4 months follow-up  Since our last visit, there has been no ER visits or hospitilizations       Patient currently has no complaints at this time and is feeling well  Patient denies any chest pain, shortness of breath and swelling  The last blood work was done on 09/28, which we have reviewed together  appetite been stable, energy level in generally unchanged  She started taking citalopram for depression and seems to be doing better  Denies any NSAID use  ROS:   All the systems were reviewed and were negative except as documented on the HPI  Allergies: Patient has no known allergies  Medications:   Current Outpatient Prescriptions:     amLODIPine (NORVASC) 10 mg tablet, Take 10 mg by mouth daily  , Disp: , Rfl:     apixaban (ELIQUIS) 2 5 mg, Take 1 tablet by mouth daily, Disp: , Rfl:     calcitriol (ROCALTROL) 0 25 mcg capsule, Take 1 capsule (0 25 mcg total) by mouth daily, Disp: 90 capsule, Rfl: 2    Cholecalciferol (VITAMIN D3) 1000 UNITS CAPS, Take 1,000 unit marking on U-100 syringe by mouth daily  , Disp: , Rfl:     citalopram (CeleXA) 20 mg tablet, , Disp: , Rfl:     JAKAFI 10 MG tablet, TAKE 1 TABLET BY MOUTH ONE TIME DAILY  , Disp: 30 tablet, Rfl: 5    levothyroxine 75 mcg tablet, Take 75 mcg by mouth daily, Disp: , Rfl: 3    metoprolol tartrate (LOPRESSOR) 25 mg tablet, Take 0 5 tablets by mouth 2 (two) times a day, Disp: , Rfl:     saccharomyces boulardii (FLORASTOR) 250 mg capsule, Take 1 capsule by mouth daily  , Disp: , Rfl:     sodium bicarbonate 650 mg tablet, Take 1 tablet (650 mg total) by mouth 2 (two) times a day, Disp: 60 tablet, Rfl: 5    WELCHOL 625 MG tablet, TAKE 1 TABLET AT BEDTIME AT 8PM, Disp: , Rfl: 2    levothyroxine 75 mcg tablet, Take 1 tablet by mouth daily in the early morning for 30 days, Disp: 30 tablet, Rfl: 0    Past Medical History:   Diagnosis Date    Anxiety     Chronic kidney disease (CKD), stage IV (severe) (HCC)     stage IV    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    Hydronephrosis     Hypertension     Hypothyroid     Incontinence     Lung mass     Improving on PET/CT 1/2016    [facility]     Pulmonary embolism ([facility] ) 2014    Urinary tract infection      Past Surgical History:   Procedure Laterality Date    BLADDER SUSPENSION      BOTOX INJECTION N/A 7/27/2016    Procedure: BOTOX INJECTION ;  Surgeon: Don Simon MD;  [facility];  Service:     CHOLECYSTECTOMY N/A     COLONOSCOPY      CYSTECTOMY, RADICAL WITH ILEOCONDUIT N/A 10/4/2016    Procedure: Graciela Sanchez WITH ILEAL CONDUIT ;  Surgeon: Don Simon MD;  [facility];  Service:    Piper Pals W/ RETROGRADES Bilateral 7/27/2016    Procedure: Argenis Carlson ;  Surgeon: Don Simon MD;  [facility];  Service:     HI COLONOSCOPY FLX DX W/COLLJ SPEC WHEN PFRMD N/A 8/31/2016    Procedure: COLONOSCOPY;  Surgeon: Jaime Armijo MD;  [facility]; Service: Gastroenterology    HI CYSTOSCOPY,INSERT URETERAL STENT Bilateral 7/27/2016    Procedure: STENT INSERTION; EXCISION OF MESH ;  Surgeon: Don Simon MD;  [facility];  Service: Urology    TONSILLECTOMY      TUBAL LIGATION      WISDOM TOOTH EXTRACTION       Family History   Problem Relation Age of Onset    Cancer Mother         small cell cancer     Heart disease Father     COPD Father     Heart disease Brother     Nephrolithiasis Brother       reports that she has never smoked  She has never used smokeless tobacco  She reports that she drinks alcohol  She reports that she does not use drugs  Physical Exam:   Vitals:    10/05/18 1030   Weight: 87 8 kg (193 lb 9 6 oz)   Height: 5' (1 524 m)     Body mass index is 37 81 kg/m²      General: cooperative, in not acute distress  Eyes: conjunctivae pink, anicteric sclerae  ENT: lips and mucous membranes moist  Neck: supple, no JVD  Chest: clear breath sounds bilateral, no crackles, ronchus or wheezings  CVS: distinct S1 & S2, normal rate, regular rhythm  Abdomen: obese, soft, non-tender, non-distended, normoactive bowel sounds, ileo conduit over  Right lower  Extremities:  Trace edema of both legs  Skin: no rash  Neuro: awake, alert, oriented      Lab Results:  Results for orders placed or performed in visit on 09/28/18   CBC   Result Value Ref Range    WBC 5 95 4 31 - 10 16 Thousand/uL    RBC 4 05 3 81 - 5 12 Million/uL    Hemoglobin 12 0 11 5 - 15 4 g/dL    Hematocrit 36 5 34 8 - 46 1 %    MCV 90 82 - 98 fL    MCH 29 6 26 8 - 34 3 pg    MCHC 32 9 31 4 - 37 4 g/dL    RDW 14 6 11 6 - 15 1 %    Platelets 324 717 - 179 Thousands/uL    MPV 9 9 8 9 - 12 7 fL   Renal function panel   Result Value Ref Range    Albumin 3 5 3 5 - 5 0 g/dL    Calcium 8 8 8 3 - 10 1 mg/dL    Phosphorus 2 2 (L) 2 3 - 4 1 mg/dL    Glucose 88 65 - 140 mg/dL    BUN 31 (H) 5 - 25 mg/dL    Creatinine 2 51 (H) 0 60 - 1 30 mg/dL    Sodium 139 136 - 145 mmol/L    Potassium 3 6 3 5 - 5 3 mmol/L    Chloride 109 (H) 100 - 108 mmol/L    CO2 21 21 - 32 mmol/L    ANION GAP 9 4 - 13 mmol/L    eGFR 19 ml/min/1 73sq m   PTH, intact   Result Value Ref Range    PTH 86 4 (H) 18 4 - 80 1 pg/mL         Portions of the record may have been created with voice recognition software  Occasional wrong word or "sound a like" substitutions may have occurred due to the inherent limitations of voice recognition software  Read the chart carefully and recognize, using context, where substitutions have occurred  If you have any questions, please contact the dictating provider

## 2018-10-05 NOTE — PATIENT INSTRUCTIONS
I want to see you back in the office in 3-4 months with repeat labs  Remember to keep saving nondominant arm ( left arm ) in anticipation of dialysis access in the future, do not have any intravenously lines or blood draws over the left arm  Decrease Calcitrol to 1 tablet 3 times a week ( Monday, Wednesdays and Fridays)  Follow a low-salt diet  Avoid NSAIDs (no ibuprofen, Motrin, Aleve, Advil, naproxen) okay to take Tylenol as needed for pain  If you developed worsening nausea, vomiting, increased fluid retention with worsening shortness of breath or increased lower extremity swelling, decreased appetite please contact as soon as possible

## 2018-10-05 NOTE — PROGRESS NOTES
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 pulmonale ([facility])    Obstructive uropathy    Polycythemia    Secondary hyperparathyroidism of renal origin ([facility] [address] )  -     calcitriol (ROCALTROL) 0 25 mcg capsule; Take 1 capsule (0 25 mcg total) by mouth every other day for 30 days Take 1 tablet 3 times a week (Monday, Wednesday, Friday)          80-year-old lady with known history of chronic kidney disease stage 4 with baseline creatinine in the mid to high 2s, history of urinary incontinence, bilateral hydronephrosis secondary to obstructive uropathy and 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 in October 2016 who returns to the office for follow-up  1  Chronic kidney disease stage 4, renal function fairly stable with a most recent creatinine of 2 5 and estimated GFR of 19  Currently she does not have any uremic symptoms  I would like to see her back in the office in 3-4 months with repeat labs  Once again discussed about the symptoms to look for suspected worsening kidney function, she will contact me if she develops any of those symptoms  Avoid NSAIDs  We also discussed above saving the nondominant arm (left arm ) in anticipation of dialysis access in the future  Discussed that if her kidney function continues to decline then will refer her to vascular surgery evaluation for dialysis access placement  She went to [facility] [employer] classes in the past       2  Hypertension, blood pressure currently well controlled, no changes on her medications        3  History of nonfunctional bladder causing bilateral hydronephrosis status post supra trigonal cystectomy and ileal conduit, continue follow-up with urologist Dr Camargo Has  4  Polycythemia area, previous history of PE, continues his with Hematology follow-up, Dr Dunia Solis  5  Mineral bone disease, noted PTH now lower, changed Calcitrol to 1 tablet 3 times a week (Monday, Wednesday, Friday) follow labs in 3-4 months  Patient Instructions    I want to see you back in the office in 3-4 months with repeat labs  Remember to keep saving nondominant arm ( left arm ) in anticipation of dialysis access in the future, do not have any intravenously lines or blood draws over the left arm  Decrease Calcitrol to 1 tablet 3 times a week ( Monday, Wednesdays and Fridays)  Follow a low-salt diet  Avoid NSAIDs (no ibuprofen, Motrin, Aleve, Advil, naproxen) okay to take Tylenol as needed for pain  If you developed worsening nausea, vomiting, increased fluid retention with worsening shortness of breath or increased lower extremity swelling, decreased appetite please contact as soon as possible  HPI: Denver Childs is a 68 y o  female who is here for Follow-up    Last time seen in our office was in May 2018, today she returns to the office for 4 months follow-up  Since our last visit, there has been no ER visits or hospitilizations  Patient currently has no complaints at this time and is feeling well  Patient denies any chest pain, shortness of breath and swelling  The last blood work was done on 09/28, which we have reviewed together  appetite been stable, energy level in generally unchanged  She started taking citalopram for depression and seems to be doing better  Denies any NSAID use  ROS:   All the systems were reviewed and were negative except as documented on the HPI  Allergies: Patient has no known allergies      Medications:   Current Outpatient Prescriptions:     amLODIPine (NORVASC) 10 mg tablet, Take 10 mg by mouth daily  , Disp: , Rfl:     apixaban (ELIQUIS) 2 5 mg, Take 1 tablet by mouth daily, Disp: , Rfl:     calcitriol (ROCALTROL) 0 25 mcg capsule, Take 1 capsule (0 25 mcg total) by mouth daily, Disp: 90 capsule, Rfl: 2    Cholecalciferol (VITAMIN D3) 1000 UNITS CAPS, Take 1,000 unit marking on U-100 syringe by mouth daily  , Disp: , Rfl:     citalopram (CeleXA) 20 mg tablet, , Disp: , Rfl:     JAKAFI 10 MG tablet, TAKE 1 TABLET BY MOUTH ONE TIME DAILY  , Disp: 30 tablet, Rfl: 5    levothyroxine 75 mcg tablet, Take 75 mcg by mouth daily, Disp: , Rfl: 3    metoprolol tartrate (LOPRESSOR) 25 mg tablet, Take 0 5 tablets by mouth 2 (two) times a day, Disp: , Rfl:     saccharomyces boulardii (FLORASTOR) 250 mg capsule, Take 1 capsule by mouth daily  , Disp: , Rfl:     sodium bicarbonate 650 mg tablet, Take 1 tablet (650 mg total) by mouth 2 (two) times a day, Disp: 60 tablet, Rfl: 5    WELCHOL 625 MG tablet, TAKE 1 TABLET AT BEDTIME AT 8PM, Disp: , Rfl: 2    levothyroxine 75 mcg tablet, Take 1 tablet by mouth daily in the early morning for 30 days, Disp: 30 tablet, Rfl: 0    Past Medical History:   Diagnosis Date    Anxiety     Chronic kidney disease (CKD), stage IV (severe) (HCC)     stage IV    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    Hydronephrosis     Hypertension     Hypothyroid     Incontinence     Lung mass     Improving on PET/CT 1/2016    Polycythemia vera ([facility] [address] )     Pulmonary embolism ([facility] [address] ) 2014    Urinary tract infection      Past Surgical History:   Procedure Laterality Date    BLADDER SUSPENSION      BOTOX INJECTION N/A 7/27/2016    Procedure: BOTOX INJECTION ;  Surgeon: Luis Angel Howard MD;  [facility];  Service:     CHOLECYSTECTOMY N/A     COLONOSCOPY      CYSTECTOMY, RADICAL WITH ILEOCONDUIT N/A 10/4/2016    Procedure: SUPRATRIGONAL CYSTECTOMY WITH ILEAL CONDUIT ;  Surgeon: Luis Angel Howard MD;  [facility];  Service:    Renaldo Pert CYSTOSCOPY W/ RETROGRADES Bilateral 7/27/2016    Procedure: Artie Smith; RETROGRADE PYELOGRAM ;  Surgeon: Judith Aviles MD;  [facility];  Service:     AK COLONOSCOPY FLX DX W/COLLJ SPEC WHEN PFRMD N/A 8/31/2016    Procedure: COLONOSCOPY;  Surgeon: Ej Montalvo MD;  [facility]: [facility]; Service: Gastroenterology    AK CYSTOSCOPY,INSERT URETERAL STENT Bilateral 7/27/2016    Procedure: STENT INSERTION; EXCISION OF MESH ;  Surgeon: Judith Aviles MD;  [facility];  Service: Urology    TONSILLECTOMY      TUBAL LIGATION      WISDOM TOOTH EXTRACTION       Family History   Problem Relation Age of Onset    Cancer Mother         small cell cancer     Heart disease Father     COPD Father     Heart disease Brother     Nephrolithiasis Brother       reports that she has never smoked  She has never used smokeless tobacco  She reports that she drinks alcohol  She reports that she does not use drugs  Physical Exam:   Vitals:    10/05/18 1030   Weight: 87 8 kg (193 lb 9 6 oz)   Height: 5' (1 524 m)     Body mass index is 37 81 kg/m²      General: cooperative, in not acute distress  Eyes: conjunctivae pink, anicteric sclerae  ENT: lips and mucous membranes moist  Neck: supple, no JVD  Chest: clear breath sounds bilateral, no crackles, ronchus or wheezings  CVS: distinct S1 & S2, normal rate, regular rhythm  Abdomen: obese, soft, non-tender, non-distended, normoactive bowel sounds, ileo conduit over  Right lower  Extremities:  Trace edema of both legs  Skin: no rash  Neuro: awake, alert, oriented      Lab Results:  Results for orders placed or performed in visit on 09/28/18   CBC   Result Value Ref Range    WBC 5 95 4 31 - 10 16 Thousand/uL    RBC 4 05 3 81 - 5 12 Million/uL    Hemoglobin 12 0 11 5 - 15 4 g/dL    Hematocrit 36 5 34 8 - 46 1 %    MCV 90 82 - 98 fL    MCH 29 6 26 8 - 34 3 pg    MCHC 32 9 31 4 - 37 4 g/dL    RDW 14 6 11 6 - 15 1 %    Platelets 644 354 - 705 Thousands/uL    MPV 9 9 8 9 - 12 7 fL   Renal function panel   Result Value Ref Range    Albumin 3 5 3 5 - 5 0 g/dL    Calcium 8 8 8 3 - 10 1 mg/dL    Phosphorus 2 2 (L) 2 3 - 4 1 mg/dL    Glucose 88 65 - 140 mg/dL    BUN 31 (H) 5 - 25 mg/dL    Creatinine 2 51 (H) 0 60 - 1 30 mg/dL    Sodium 139 136 - 145 mmol/L    Potassium 3 6 3 5 - 5 3 mmol/L    Chloride 109 (H) 100 - 108 mmol/L    CO2 21 21 - 32 mmol/L    ANION GAP 9 4 - 13 mmol/L    eGFR 19 ml/min/1 73sq m   PTH, intact   Result Value Ref Range    PTH 86 4 (H) 18 4 - 80 1 pg/mL         Portions of the record may have been created with voice recognition software  Occasional wrong word or "sound a like" substitutions may have occurred due to the inherent limitations of voice recognition software  Read the chart carefully and recognize, using context, where substitutions have occurred  If you have any questions, please contact the dictating provider

## 2018-10-06 ENCOUNTER — HOSPITAL ENCOUNTER (INPATIENT)
Facility: HOSPITAL | Age: 73
LOS: 2 days | Discharge: HOME | DRG: 388 | End: 2018-10-08
Attending: EMERGENCY MEDICINE | Admitting: SURGERY
Payer: COMMERCIAL

## 2018-10-06 ENCOUNTER — APPOINTMENT (EMERGENCY)
Dept: RADIOLOGY | Facility: HOSPITAL | Age: 73
DRG: 388 | End: 2018-10-06
Payer: COMMERCIAL

## 2018-10-06 DIAGNOSIS — K56.609 SMALL BOWEL OBSTRUCTION (HCC): Primary | ICD-10-CM

## 2018-10-06 PROBLEM — E03.9 HYPOTHYROIDISM: Status: ACTIVE | Noted: 2018-10-06

## 2018-10-06 PROBLEM — I10 HYPERTENSION: Status: ACTIVE | Noted: 2018-10-06

## 2018-10-06 LAB
ALBUMIN SERPL BCP-MCNC: 4.1 G/DL (ref 3.5–5)
ALP SERPL-CCNC: 71 U/L (ref 46–116)
ALT SERPL W P-5'-P-CCNC: 13 U/L (ref 12–78)
ANION GAP SERPL CALCULATED.3IONS-SCNC: 9 MMOL/L (ref 4–13)
AST SERPL W P-5'-P-CCNC: 22 U/L (ref 5–45)
BASOPHILS # BLD AUTO: 0.07 THOUSANDS/ΜL (ref 0–0.1)
BASOPHILS NFR BLD AUTO: 1 % (ref 0–1)
BILIRUB SERPL-MCNC: 0.58 MG/DL (ref 0.2–1)
BUN SERPL-MCNC: 36 MG/DL (ref 5–25)
CALCIUM SERPL-MCNC: 10 MG/DL (ref 8.3–10.1)
CHLORIDE SERPL-SCNC: 106 MMOL/L (ref 100–108)
CO2 SERPL-SCNC: 23 MMOL/L (ref 21–32)
CREAT SERPL-MCNC: 2.91 MG/DL (ref 0.6–1.3)
EOSINOPHIL # BLD AUTO: 0.04 THOUSAND/ΜL (ref 0–0.61)
EOSINOPHIL NFR BLD AUTO: 1 % (ref 0–6)
ERYTHROCYTE [DISTWIDTH] IN BLOOD BY AUTOMATED COUNT: 14.6 % (ref 11.6–15.1)
GFR SERPL CREATININE-BSD FRML MDRD: 16 ML/MIN/1.73SQ M
GLUCOSE SERPL-MCNC: 172 MG/DL (ref 65–140)
HCT VFR BLD AUTO: 40.8 % (ref 34.8–46.1)
HGB BLD-MCNC: 13.4 G/DL (ref 11.5–15.4)
IMM GRANULOCYTES # BLD AUTO: 0.04 THOUSAND/UL (ref 0–0.2)
IMM GRANULOCYTES NFR BLD AUTO: 1 % (ref 0–2)
LACTATE SERPL-SCNC: 1.6 MMOL/L (ref 0.5–2)
LIPASE SERPL-CCNC: 383 U/L (ref 73–393)
LYMPHOCYTES # BLD AUTO: 0.53 THOUSANDS/ΜL (ref 0.6–4.47)
LYMPHOCYTES NFR BLD AUTO: 6 % (ref 14–44)
MCH RBC QN AUTO: 29.4 PG (ref 26.8–34.3)
MCHC RBC AUTO-ENTMCNC: 32.8 G/DL (ref 31.4–37.4)
MCV RBC AUTO: 90 FL (ref 82–98)
MONOCYTES # BLD AUTO: 0.32 THOUSAND/ΜL (ref 0.17–1.22)
MONOCYTES NFR BLD AUTO: 4 % (ref 4–12)
NEUTROPHILS # BLD AUTO: 7.26 THOUSANDS/ΜL (ref 1.85–7.62)
NEUTS SEG NFR BLD AUTO: 87 % (ref 43–75)
NRBC BLD AUTO-RTO: 0 /100 WBCS
PLATELET # BLD AUTO: 366 THOUSANDS/UL (ref 149–390)
PMV BLD AUTO: 9.6 FL (ref 8.9–12.7)
POTASSIUM SERPL-SCNC: 4.2 MMOL/L (ref 3.5–5.3)
PROT SERPL-MCNC: 7.8 G/DL (ref 6.4–8.2)
RBC # BLD AUTO: 4.56 MILLION/UL (ref 3.81–5.12)
SODIUM SERPL-SCNC: 138 MMOL/L (ref 136–145)
TROPONIN I SERPL-MCNC: <0.02 NG/ML
WBC # BLD AUTO: 8.26 THOUSAND/UL (ref 4.31–10.16)

## 2018-10-06 PROCEDURE — 80053 COMPREHEN METABOLIC PANEL: CPT | Performed by: EMERGENCY MEDICINE

## 2018-10-06 PROCEDURE — 96361 HYDRATE IV INFUSION ADD-ON: CPT

## 2018-10-06 PROCEDURE — 96374 THER/PROPH/DIAG INJ IV PUSH: CPT

## 2018-10-06 PROCEDURE — 36415 COLL VENOUS BLD VENIPUNCTURE: CPT | Performed by: EMERGENCY MEDICINE

## 2018-10-06 PROCEDURE — 99285 EMERGENCY DEPT VISIT HI MDM: CPT

## 2018-10-06 PROCEDURE — 83690 ASSAY OF LIPASE: CPT | Performed by: EMERGENCY MEDICINE

## 2018-10-06 PROCEDURE — 96375 TX/PRO/DX INJ NEW DRUG ADDON: CPT

## 2018-10-06 PROCEDURE — 93005 ELECTROCARDIOGRAM TRACING: CPT

## 2018-10-06 PROCEDURE — 84484 ASSAY OF TROPONIN QUANT: CPT | Performed by: EMERGENCY MEDICINE

## 2018-10-06 PROCEDURE — 99223 1ST HOSP IP/OBS HIGH 75: CPT | Performed by: SURGERY

## 2018-10-06 PROCEDURE — 85025 COMPLETE CBC W/AUTO DIFF WBC: CPT | Performed by: EMERGENCY MEDICINE

## 2018-10-06 PROCEDURE — 83605 ASSAY OF LACTIC ACID: CPT | Performed by: EMERGENCY MEDICINE

## 2018-10-06 PROCEDURE — 74176 CT ABD & PELVIS W/O CONTRAST: CPT

## 2018-10-06 RX ORDER — HEPARIN SODIUM 10000 [USP'U]/100ML
3-30 INJECTION, SOLUTION INTRAVENOUS
Status: DISCONTINUED | OUTPATIENT
Start: 2018-10-06 | End: 2018-10-08

## 2018-10-06 RX ORDER — ONDANSETRON 2 MG/ML
4 INJECTION INTRAMUSCULAR; INTRAVENOUS ONCE
Status: COMPLETED | OUTPATIENT
Start: 2018-10-06 | End: 2018-10-06

## 2018-10-06 RX ORDER — ONDANSETRON 2 MG/ML
4 INJECTION INTRAMUSCULAR; INTRAVENOUS EVERY 6 HOURS PRN
Status: DISCONTINUED | OUTPATIENT
Start: 2018-10-06 | End: 2018-10-08 | Stop reason: HOSPADM

## 2018-10-06 RX ORDER — HYDROMORPHONE HCL/PF 1 MG/ML
0.5 SYRINGE (ML) INJECTION
Status: DISCONTINUED | OUTPATIENT
Start: 2018-10-06 | End: 2018-10-08 | Stop reason: HOSPADM

## 2018-10-06 RX ORDER — SODIUM CHLORIDE 9 MG/ML
125 INJECTION, SOLUTION INTRAVENOUS CONTINUOUS
Status: DISCONTINUED | OUTPATIENT
Start: 2018-10-06 | End: 2018-10-08

## 2018-10-06 RX ORDER — METOPROLOL TARTRATE 5 MG/5ML
5 INJECTION INTRAVENOUS EVERY 6 HOURS PRN
Status: DISCONTINUED | OUTPATIENT
Start: 2018-10-06 | End: 2018-10-08 | Stop reason: HOSPADM

## 2018-10-06 RX ORDER — FENTANYL CITRATE 50 UG/ML
50 INJECTION, SOLUTION INTRAMUSCULAR; INTRAVENOUS ONCE
Status: COMPLETED | OUTPATIENT
Start: 2018-10-06 | End: 2018-10-06

## 2018-10-06 RX ADMIN — HEPARIN SODIUM 18 UNITS/KG/HR: 10000 INJECTION, SOLUTION INTRAVENOUS at 23:07

## 2018-10-06 RX ADMIN — SODIUM CHLORIDE 500 ML: 0.9 INJECTION, SOLUTION INTRAVENOUS at 21:42

## 2018-10-06 RX ADMIN — FENTANYL CITRATE 50 MCG: 50 INJECTION, SOLUTION INTRAMUSCULAR; INTRAVENOUS at 21:42

## 2018-10-06 RX ADMIN — ONDANSETRON 4 MG: 2 INJECTION INTRAMUSCULAR; INTRAVENOUS at 21:42

## 2018-10-07 LAB
ANION GAP SERPL CALCULATED.3IONS-SCNC: 10 MMOL/L (ref 4–13)
APTT PPP: >210 SECONDS (ref 24–36)
APTT PPP: >210 SECONDS (ref 24–36)
BASOPHILS # BLD AUTO: 0.05 THOUSANDS/ΜL (ref 0–0.1)
BASOPHILS NFR BLD AUTO: 1 % (ref 0–1)
BUN SERPL-MCNC: 34 MG/DL (ref 5–25)
CALCIUM SERPL-MCNC: 8.8 MG/DL (ref 8.3–10.1)
CHLORIDE SERPL-SCNC: 111 MMOL/L (ref 100–108)
CO2 SERPL-SCNC: 21 MMOL/L (ref 21–32)
CREAT SERPL-MCNC: 2.53 MG/DL (ref 0.6–1.3)
EOSINOPHIL # BLD AUTO: 0.01 THOUSAND/ΜL (ref 0–0.61)
EOSINOPHIL NFR BLD AUTO: 0 % (ref 0–6)
ERYTHROCYTE [DISTWIDTH] IN BLOOD BY AUTOMATED COUNT: 14.6 % (ref 11.6–15.1)
GFR SERPL CREATININE-BSD FRML MDRD: 18 ML/MIN/1.73SQ M
GLUCOSE SERPL-MCNC: 123 MG/DL (ref 65–140)
HCT VFR BLD AUTO: 35.7 % (ref 34.8–46.1)
HGB BLD-MCNC: 11.4 G/DL (ref 11.5–15.4)
IMM GRANULOCYTES # BLD AUTO: 0.08 THOUSAND/UL (ref 0–0.2)
IMM GRANULOCYTES NFR BLD AUTO: 1 % (ref 0–2)
LYMPHOCYTES # BLD AUTO: 0.5 THOUSANDS/ΜL (ref 0.6–4.47)
LYMPHOCYTES NFR BLD AUTO: 5 % (ref 14–44)
MAGNESIUM SERPL-MCNC: 2 MG/DL (ref 1.6–2.6)
MCH RBC QN AUTO: 29.5 PG (ref 26.8–34.3)
MCHC RBC AUTO-ENTMCNC: 31.9 G/DL (ref 31.4–37.4)
MCV RBC AUTO: 92 FL (ref 82–98)
MONOCYTES # BLD AUTO: 0.54 THOUSAND/ΜL (ref 0.17–1.22)
MONOCYTES NFR BLD AUTO: 6 % (ref 4–12)
NEUTROPHILS # BLD AUTO: 8.28 THOUSANDS/ΜL (ref 1.85–7.62)
NEUTS SEG NFR BLD AUTO: 87 % (ref 43–75)
NRBC BLD AUTO-RTO: 0 /100 WBCS
PLATELET # BLD AUTO: 328 THOUSANDS/UL (ref 149–390)
PMV BLD AUTO: 9.8 FL (ref 8.9–12.7)
POTASSIUM SERPL-SCNC: 4 MMOL/L (ref 3.5–5.3)
RBC # BLD AUTO: 3.87 MILLION/UL (ref 3.81–5.12)
SODIUM SERPL-SCNC: 142 MMOL/L (ref 136–145)
WBC # BLD AUTO: 9.46 THOUSAND/UL (ref 4.31–10.16)

## 2018-10-07 PROCEDURE — 80048 BASIC METABOLIC PNL TOTAL CA: CPT | Performed by: SURGERY

## 2018-10-07 PROCEDURE — 83735 ASSAY OF MAGNESIUM: CPT | Performed by: SURGERY

## 2018-10-07 PROCEDURE — 85025 COMPLETE CBC W/AUTO DIFF WBC: CPT | Performed by: SURGERY

## 2018-10-07 PROCEDURE — 99232 SBSQ HOSP IP/OBS MODERATE 35: CPT | Performed by: SURGERY

## 2018-10-07 PROCEDURE — 85730 THROMBOPLASTIN TIME PARTIAL: CPT | Performed by: SURGERY

## 2018-10-07 RX ORDER — ACETAMINOPHEN 160 MG/5ML
650 SUSPENSION, ORAL (FINAL DOSE FORM) ORAL EVERY 4 HOURS PRN
Status: DISCONTINUED | OUTPATIENT
Start: 2018-10-07 | End: 2018-10-08 | Stop reason: HOSPADM

## 2018-10-07 RX ADMIN — SODIUM CHLORIDE 125 ML/HR: 0.9 INJECTION, SOLUTION INTRAVENOUS at 08:50

## 2018-10-07 RX ADMIN — SODIUM CHLORIDE 125 ML/HR: 0.9 INJECTION, SOLUTION INTRAVENOUS at 00:17

## 2018-10-07 RX ADMIN — HYDROMORPHONE HYDROCHLORIDE 0.5 MG: 1 INJECTION, SOLUTION INTRAMUSCULAR; INTRAVENOUS; SUBCUTANEOUS at 00:46

## 2018-10-07 RX ADMIN — HEPARIN SODIUM 15 UNITS/KG/HR: 10000 INJECTION, SOLUTION INTRAVENOUS at 16:57

## 2018-10-07 RX ADMIN — ONDANSETRON 4 MG: 2 INJECTION, SOLUTION INTRAMUSCULAR; INTRAVENOUS at 00:45

## 2018-10-07 NOTE — ED ATTENDING ATTESTATION
Maribell Almanza DO, saw and evaluated the patient  I have discussed the patient with the resident/non-physician practitioner and agree with the resident's/non-physician practitioner's findings, Plan of Care, and MDM as documented in the resident's/non-physician practitioner's note, except where noted  All available labs and Radiology studies were reviewed  At this point I agree with the current assessment done in the Emergency Department  I have conducted an independent evaluation of this patient including a focused history and a physical exam     ED Note - Elham Goldberg 68 y o  female MRN: [id number]  Unit/Bed#: ED 23 Encounter: [id number]    History of Present Illness   HPI  Elham Goldberg is a 68 y o  female who presents for evaluation of generalized crampy non-radiating abdominal pain which onset acutely at approx  1700 hours  Hx of bowel obstruction and feels similar  +ve nausea and vomiting  No flatulence and no output from ostomy since this morning  No home remedies  No obvious alleviating or exacerbating factors  No fever or chills  No urinary symptoms  No chest pain, shortness of breath, dizziness, diaphoresis  REVIEW OF SYSTEMS  See HPI for further details  12 systems reviewed and otherwise negative except as noted     Historical Information     PAST MEDICAL HISTORY  Past Medical History:   Diagnosis Date    Anxiety     Chronic kidney disease (CKD), stage IV (severe) (HCC)     stage IV    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    Hydronephrosis     Hypertension     Hypothyroid     Incontinence     Lung mass     Improving on PET/CT 1/2016    [facility]cy[facility]mia vera ([facility] [address] )     Pulmonary embolism ([facility] [address] ) 2014    Urinary tract infection        FAMILY HISTORY  Family History   Problem Relation Age of Onset    Cancer Mother         small cell cancer     Heart disease Father     COPD Father     Heart disease Brother     Nephrolithiasis Brother        SOCIAL HISTORY  Social History     Social History    Marital status:      Spouse name: N/A    Number of children: N/A    Years of education: N/A     Social History Main Topics    Smoking status: Never Smoker    Smokeless tobacco: Never Used    Alcohol use Yes      Comment: occasionally    Drug use: No    Sexual activity: No     Other Topics Concern    None     Social History Narrative    None       SURGICAL HISTORY  Past Surgical History:   Procedure Laterality Date    BLADDER SUSPENSION      BOTOX INJECTION N/A 7/27/2016    Procedure: BOTOX INJECTION ;  Surgeon: Timoteo Franco MD;  [facility];  Service:     CHOLECYSTECTOMY N/A     COLONOSCOPY      CYSTECTOMY, RADICAL WITH ILEOCONDUIT N/A 10/4/2016    Procedure: Noemí Less WITH ILEAL CONDUIT ;  Surgeon: Timoteo Franco MD;  [facility];  Service:    Jory Fairview W/ RETROGRADES Bilateral 7/27/2016    Procedure: Evgeny Foy; RETROGRADE PYELOGRAM ;  Surgeon: Timoteo Franco MD;  [facility];  Service:     NM COLONOSCOPY FLX DX W/COLLJ [address] PFRMD N/A 8/31/2016    Procedure: COLONOSCOPY;  Surgeon: Jess Barrera MD;  [facility]; Service: Gastroenterology    NM CYSTOSCOPY,INSERT URETERAL STENT Bilateral 7/27/2016    Procedure: STENT INSERTION; EXCISION OF MESH ;  Surgeon: Timoteo Franco MD;  [facility];  Service: Urology    TONSILLECTOMY      TUBAL LIGATION      WISDOM TOOTH EXTRACTION       Meds/Allergies     CURRENT MEDICATIONS  No current facility-administered medications for this encounter       Current Outpatient Prescriptions:     amLODIPine (NORVASC) 10 mg tablet, Take 10 mg by mouth daily  , Disp: , Rfl:     apixaban (ELIQUIS) 2 5 mg, Take 1 tablet by mouth daily, Disp: , Rfl:     calcitriol (ROCALTROL) 0 25 mcg capsule, Take 1 capsule (0 25 mcg total) by mouth every other day for 30 days Take 1 tablet 3 times a week (Monday, Wednesday, Friday), Disp: 15 capsule, Rfl: 5   Cholecalciferol (VITAMIN D3) 1000 UNITS CAPS, Take 1,000 unit marking on U-100 syringe by mouth daily  , Disp: , Rfl:     citalopram (CeleXA) 20 mg tablet, , Disp: , Rfl:     JAKAFI 10 MG tablet, TAKE 1 TABLET BY MOUTH ONE TIME DAILY  , Disp: 30 tablet, Rfl: 5    levothyroxine 75 mcg tablet, Take 1 tablet by mouth daily in the early morning for 30 days, Disp: 30 tablet, Rfl: 0    levothyroxine 75 mcg tablet, Take 75 mcg by mouth daily, Disp: , Rfl: 3    metoprolol tartrate (LOPRESSOR) 25 mg tablet, Take 0 5 tablets by mouth 2 (two) times a day, Disp: , Rfl:     saccharomyces boulardii (FLORASTOR) 250 mg capsule, Take 1 capsule by mouth daily  , Disp: , Rfl:     sodium bicarbonate 650 mg tablet, Take 1 tablet (650 mg total) by mouth 2 (two) times a day, Disp: 60 tablet, Rfl: 5    WELCHOL 625 MG tablet, TAKE 1 TABLET AT BEDTIME AT 8PM, Disp: , Rfl: 2    (Not in a hospital admission)    ALLERGIES  No Known Allergies  Objective     PHYSICAL EXAM    VITAL SIGNS: Blood pressure 167/96, pulse 66, temperature 97 5 °F (36 4 °C), temperature source Oral, resp  rate 20, height 5' (1 524 m), weight 87 1 kg (192 lb), SpO2 96 %  Constitutional:  Appears well developed and well nourished, no acute distress, non-toxic appearance   Eyes:  PERRL, EOMI, conjunctivae pink, sclerae non-icteric   HENT:  Normocephalic/Atraumatic, no rhinorrhea, mucous membranes moist   Neck: normal range of motion, no tenderness, supple   Respiratory:  No respiratory distress, normal breath sounds   Cardiovascular:  Normal rate, normal rhythm    GI:  Soft, diffusely tender, non-distended, no organomegaly, no mass, no rebound, no guarding  Ostomy bag present void of stool    :  No CVAT, no flank ecchymosis   Musculoskeletal:  No swelling or edema, no tenderness, no deformities  Integument:  Pink, warm, dry, Well hydrated, no rash, no erythema, no bullae   Lymphatic:  No cervical/ tonsillar/ submandibular lymphadenopathy noted   Neurologic: Awake, Alert & oriented x 3, CN 2-12 intact, no focal neurological deficits  Psychiatric:  Speech and behavior appropriate       ED COURSE and MDM:    Assessment/Plan   Assessment:  Lauren Mcarthur is a 68 y o  female presents for evaluation of abdominal pain with nausea/ vomiting  Concern for bowel obstruction  Plan:  Labs, CT a/p, IV fluids, symptom management, disposition as appropriate  CRITICAL CARE TIME: 0 minutes      Portions of the record may have been created with voice recognition software  Occasional wrong word or "sound a like" substitutions may have occurred due to the inherent limitations of voice recognition software       ED Provider  Electronically Signed by

## 2018-10-07 NOTE — PLAN OF CARE
Problem: DISCHARGE PLANNING - CARE MANAGEMENT  Goal: Discharge to post-acute care or home with appropriate resources  INTERVENTIONS:  - Conduct assessment to determine patient/family and health care team treatment goals, and need for post-acute services based on payer coverage, community resources, and patient preferences, and barriers to discharge  - Address psychosocial, clinical, and financial barriers to discharge as identified in assessment in conjunction with the patient/family and health care team  - Arrange appropriate level of post-acute services according to patient's   needs and preference and payer coverage in collaboration with the physician and health care team  - Communicate with and update the patient/family, physician, and health care team regarding progress on the discharge plan  - Arrange appropriate transportation to post-acute venues  - Home v  SNF   Outcome: Progressing

## 2018-10-07 NOTE — SOCIAL WORK
CM met w/pt @ bedside to discuss CM role and discharge needs  Pt does not have POA  Pt lives with family in [facility]; 4 [address]  Pt independent with ADLs  Pt has RW and BSC  Hx [facility]  Pt's preference for pharmacy is [facility] [address] in [place]  Pt's preferred contact is Lauri Joiner (daughter) [phone]  CM following for d/c needs  CM reviewed d/c planning process including the following: identifying help at home, patient preference for d/c planning needs, Discharge Lounge, [facility] Meds to Bed program, availability of treatment team to discuss questions or concerns patient and/or family may have regarding understanding medications and recognizing signs and symptoms once discharged  CM also encouraged patient to follow up with all recommended appointments after discharge  Patient advised of importance for patient and family to participate in managing patients medical well being

## 2018-10-07 NOTE — PROGRESS NOTES
Progress Note - General Surgery   Marciano [place] 68 y o  female MRN: [id number]  Unit/Bed#: [facility] 180-98 Encounter: [id number]    Assessment:  41-year-old female with small bowel obstruction  Doing much better this morning with loose BMs, flatus and nausea resolved    Plan:  NG tube clamp trial  Remove NG tube and start clears if passes clamp trial  IV fluids  Continue heparin drip for history of pulmonary embolism  Encourage ambulation  Strict ins/outs  Pulmonary toilet/IS    Subjective/Objective   Chief Complaint:     Subjective:  No events overnight  Passing flatus and having bowel movements  Nausea resolved  Denies pain    Objective:     Blood pressure 156/73, pulse 64, temperature 97 5 °F (36 4 °C), temperature source Oral, resp  rate 22, height 5' (1 524 m), weight 87 1 kg (192 lb), SpO2 92 %  ,Body mass index is 37 5 kg/m²  Intake/Output Summary (Last 24 hours) at 10/07/18 0516  Last data filed at 10/07/18 0100   Gross per 24 hour   Intake                0 ml   Output              400 ml   Net             -400 ml       Invasive Devices     Peripheral Intravenous Line            Peripheral IV 10/07/18 Right Arm less than 1 day          Drain            Urostomy Ileal conduit  days    NG/OG/Enteral Tube Nasogastric 18 Fr Right nares less than 1 day                Physical Exam:   No acute distress  Regular rate and rhythm  Nonlabored respirations on room air  Abdomen soft, nontender    Ileal conduit pink and healthy appearing with yellow urine in the bag    Lab, Imaging and other studies:  CBC:   Lab Results   Component Value Date    WBC 8 26 10/06/2018    HGB 13 4 10/06/2018    HCT 40 8 10/06/2018    MCV 90 10/06/2018     10/06/2018    MCH 29 4 10/06/2018    MCHC 32 8 10/06/2018    RDW 14 6 10/06/2018    MPV 9 6 10/06/2018    NRBC 0 10/06/2018   , CMP:   Lab Results   Component Value Date     10/06/2018    K 4 2 10/06/2018     10/06/2018    CO2 23 10/06/2018    BUN 36 (H) 10/06/2018    CREATININE 2 91 (H) 10/06/2018    CALCIUM 10 0 10/06/2018    AST 22 10/06/2018    ALT 13 10/06/2018    ALKPHOS 71 10/06/2018    EGFR 16 10/06/2018   , Coagulation: No results found for: PT, INR, APTT, Urinalysis: No results found for: Harlin Lites, SPECGRAV, PHUR, LEUKOCYTESUR, NITRITE, PROTEINUA, GLUCOSEU, KETONESU, BILIRUBINUR, BLOODU, Amylase: No results found for: AMYLASE, Lipase:   Lab Results   Component Value Date    LIPASE 383 10/06/2018     VTE Pharmacologic Prophylaxis: Heparin  VTE Mechanical Prophylaxis: sequential compression device

## 2018-10-07 NOTE — H&P
H&P - General Surgery   Carroll Padilla 68 y o  female MRN: [id number]  Unit/Bed#: ED 23 Encounter: [id number]    Assessment and Plan:  71-year-old female with small-bowel obstruction, likely from adhesions  History of hypertension, hypothyroidism, pulmonary embolism  Admit to General surgery  NPO  NG tube to suction for decompression  IV fluid hydration  Monitor and replete electrolytes  P r n  Analgesia  Serial abdominal exams  Encourage ambulation  Heparin drip for history of pulmonary embolism  Hold Eliquis      History of Present Illness   HPI:  Carroll Padilla is a 68 y o  female who presents with abdominal pain  The patient has a history of supra trigonal cystectomy with ileal conduit in 10/2016 for urinary incontinence  Also has a history of laparoscopic converted to open cholecystectomy  She complains of abdominal pain, nausea, vomiting (bilious) onset earlier today  Pain is mostly located in the right side of her abdomen, without alleviating or exacerbating factors  She had a prior history of small-bowel obstruction in February 2018 that was managed conservatively  She denies any fever, or chills  She had a small bowel movement earlier today and also passed flatus earlier today  She presented to the emergency department for evaluation where CT scan of her abdomen and pelvis revealed small bowel obstruction with transition point in the right lower quadrant  Surgical consultation was obtained      Consults    Review of Systems  Otherwise unremarkable except as per HPI    Historical Information   Past Medical History:   Diagnosis Date    Anxiety     Chronic kidney disease (CKD), stage IV (severe) (HCC)     stage IV    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    Hydronephrosis     Hypertension     Hypothyroid     Incontinence     Lung mass     Improving on PET/CT 1/2016    [facility] vera [facility] [facility]     Pulmonary embolism ([facility] [address] ) 2014    Urinary tract infection      Past Surgical History:   Procedure Laterality Date    BLADDER SUSPENSION      BOTOX INJECTION N/A 7/27/2016    Procedure: BOTOX INJECTION ;  Surgeon: Chaya Cuenca MD;  [facility];  Service:    Kaveh ashley 62, RADICAL WITH ILEOCONDUIT N/A 10/4/2016    Procedure: Anny Stade WITH ILEAL CONDUIT ;  Surgeon: Chaya Cuenca MD;  Location[facility];  Service:    Delcie Reese CYSTOSCOPY W/ RETROGRADES Bilateral 7/27/2016    Procedure: Marcey Prader; RETROGRADE PYELOGRAM ;  Surgeon: Chaya Cuenca MD;  [facility];  Service:     MS COLONOSCOPY FLX DX W/COLLJ [address] PFRMD N/A 8/31/2016    Procedure: COLONOSCOPY;  Surgeon: Melonie Mcgowan MD;  Location: [facility]; Service: Gastroenterology    MS CYSTOSCOPY,INSERT URETERAL STENT Bilateral 7/27/2016    Procedure: STENT INSERTION; EXCISION OF MESH ;  Surgeon: Chaya Cuenca MD;  [facility];  Service: Urology    TONSILLECTOMY      TUBAL LIGATION      WISDOM TOOTH EXTRACTION       Social History   History   Alcohol Use    Yes     Comment: occasionally     History   Drug Use No     History   Smoking Status    Never Smoker   Smokeless Tobacco    Never Used     Family History   Problem Relation Age of Onset    Cancer Mother         small cell cancer     Heart disease Father     COPD Father     Heart disease Brother     Nephrolithiasis Brother        Meds/Allergies   PTA meds:   Prior to Admission Medications   Prescriptions Last Dose Informant Patient Reported? Taking? Cholecalciferol (VITAMIN D3) 1000 UNITS CAPS  Self Yes Yes   Sig: Take 1,000 unit marking on U-100 syringe by mouth daily  JAKAFI 10 MG tablet   No Yes   Sig: TAKE 1 TABLET BY MOUTH ONE TIME DAILY     WELCHOL 625 MG tablet  Self Yes Yes   Sig: TAKE 1 TABLET AT BEDTIME AT 8PM   amLODIPine (NORVASC) 10 mg tablet  Self Yes Yes   Sig: Take 10 mg by mouth daily     apixaban (ELIQUIS) 2 5 mg  Self Yes Yes   Sig: Take 1 tablet by mouth daily   calcitriol (ROCALTROL) 0 25 mcg capsule   No Yes   Sig: Take 1 capsule (0 25 mcg total) by mouth every other day for 30 days Take 1 tablet 3 times a week (Monday, Wednesday, Friday)   citalopram (CeleXA) 20 mg tablet  Self Yes Yes   levothyroxine 75 mcg tablet   No No   Sig: Take 1 tablet by mouth daily in the early morning for 30 days   levothyroxine 75 mcg tablet  Self Yes Yes   Sig: Take 75 mcg by mouth daily   metoprolol tartrate (LOPRESSOR) 25 mg tablet  Self Yes Yes   Sig: Take 0 5 tablets by mouth 2 (two) times a day   saccharomyces boulardii (FLORASTOR) 250 mg capsule  Self Yes Yes   Sig: Take 1 capsule by mouth daily     sodium bicarbonate 650 mg tablet   No Yes   Sig: Take 1 tablet (650 mg total) by mouth 2 (two) times a day      Facility-Administered Medications: None     No Known Allergies    Objective   First Vitals:   Blood Pressure: 167/96 (10/06/18 2016)  Pulse: 66 (10/06/18 2016)  Temperature: 97 5 °F (36 4 °C) (10/06/18 2016)  Temp Source: Oral (10/06/18 2016)  Respirations: 20 (10/06/18 2016)  Height: 5' (152 4 cm) (10/06/18 2016)  Weight - Scale: 87 1 kg (192 lb) (10/06/18 2016)  SpO2: 96 % (10/06/18 2016)    Current Vitals:   Blood Pressure: 156/73 (10/06/18 2201)  Pulse: 64 (10/06/18 2201)  Temperature: 97 5 °F (36 4 °C) (10/06/18 2016)  Temp Source: Oral (10/06/18 2016)  Respirations: 22 (10/06/18 2201)  Height: 5' (152 4 cm) (10/06/18 2016)  Weight - Scale: 87 1 kg (192 lb) (10/06/18 2016)  SpO2: 92 % (10/06/18 2201)    No intake or output data in the 24 hours ending 10/06/18 2219    Invasive Devices     Peripheral Intravenous Line            Peripheral IV 10/06/18 Right Antecubital less than 1 day          Drain            Urostomy Ileal conduit  days                Physical Exam   Constitutional: She is oriented to person, place, and time  She appears well-developed and well-nourished  HENT:   Head: Normocephalic and atraumatic     Eyes: Pupils are equal, round, and reactive to light  Conjunctivae and EOM are normal    Neck: Normal range of motion  Neck supple  No thyromegaly present  Cardiovascular: Normal rate, regular rhythm and normal heart sounds  Pulmonary/Chest: Effort normal and breath sounds normal    Abdominal: Soft  Bowel sounds are normal  She exhibits distension  There is tenderness  Mild abdominal distention, tender right side of the abdomen  Well-healed right subcostal incision  Well-healed vertical infraumbilical midline incision  Right lower quadrant ileal conduit pink with clear yellow urine  Musculoskeletal: Normal range of motion  She exhibits no edema  Neurological: She is alert and oriented to person, place, and time  Skin: Skin is warm and dry  Psychiatric: She has a normal mood and affect  Vitals reviewed  Lab Results:   CBC:   Lab Results   Component Value Date    WBC 8 26 10/06/2018    HGB 13 4 10/06/2018    HCT 40 8 10/06/2018    MCV 90 10/06/2018     10/06/2018    MCH 29 4 10/06/2018    MCHC 32 8 10/06/2018    RDW 14 6 10/06/2018    MPV 9 6 10/06/2018    NRBC 0 10/06/2018   , CMP:   Lab Results   Component Value Date     10/06/2018    K 4 2 10/06/2018     10/06/2018    CO2 23 10/06/2018    BUN 36 (H) 10/06/2018    CREATININE 2 91 (H) 10/06/2018    CALCIUM 10 0 10/06/2018    AST 22 10/06/2018    ALT 13 10/06/2018    ALKPHOS 71 10/06/2018    EGFR 16 10/06/2018   , Coagulation: No results found for: PT, INR, APTT, Urinalysis: No results found for: Pema Comber, SPECGRAV, PHUR, LEUKOCYTESUR, NITRITE, PROTEINUA, GLUCOSEU, KETONESU, BILIRUBINUR, BLOODU, Amylase: No results found for: AMYLASE, Lipase:   Lab Results   Component Value Date    LIPASE 383 10/06/2018     Imaging: I have personally reviewed pertinent films in PACS  EKG, Pathology, and Other Studies: I have personally reviewed pertinent films in PACS    Counseling / Coordination of Care  Total floor / unit time spent today 25 minutes    Greater than 50% of total time was spent with the patient and / or family counseling and / or coordination of care      Dorothy Larios MD  10/6/2018 10:19 PM

## 2018-10-07 NOTE — CASE MANAGEMENT
Initial Clinical Review    Admission: Date/Time/Statement: 10/6/18 @ 2241     Orders Placed This Encounter   Procedures    Inpatient Admission     Standing Status:   Standing     Number of Occurrences:   1     Order Specific Question:   Admitting Physician     Answer:   Dominguez Zhong     Order Specific Question:   Level of Care     Answer:   Med Surg [16]     Order Specific Question:   Estimated length of stay     Answer:   More than 2 Midnights     Order Specific Question:   Certification     Answer:   I certify that inpatient services are medically necessary for this patient for a duration of greater than two midnights  See H&P and MD Progress Notes for additional information about the patient's course of treatment  ED: Date/Time/Mode of Arrival:   ED Arrival Information     Expected Arrival Acuity Means of Arrival Escorted By Service Admission Type    - 10/6/2018 20:08 Emergent Walk-In Self Surgery-General Emergency    Arrival Complaint    Abdominal Pain          Chief Complaint:   Chief Complaint   Patient presents with    Abdominal Pain     Pt present to ed with c/o abdominal pain and episodes of vomiting  Pt reports feels like it did when she was here for a blockage  History of Illness: 68 y o  female who presents with abdominal pain  The patient has a history of supra trigonal cystectomy with ileal conduit in 10/2016 for urinary incontinence and laparoscopic converted to open cholecystectomy  She complains of abdominal pain, nausea, vomiting (bilious) onset earlier today  Pain is mostly located in the right side of her abdomen, without alleviating or exacerbating factors  She had a prior history of small-bowel obstruction in February 2018 that was managed conservatively  She had a small bowel movement earlier today and also passed flatus earlier today          ED Vital Signs:   ED Triage Vitals   Temperature Pulse Respirations Blood Pressure SpO2   10/06/18 2016 10/06/18 2016 10/06/18 2016 10/06/18 2016 10/06/18 2016   97 5 °F (36 4 °C) 66 20 167/96 96 %      Temp Source Heart Rate Source Patient Position - Orthostatic VS BP Location FiO2 (%)   10/06/18 2016 10/06/18 2016 10/06/18 2016 10/06/18 2201 --   Oral Monitor Sitting Left arm       Pain Score       10/06/18 2016       Worst Possible Pain        Wt Readings from Last 1 Encounters:   10/06/18 87 1 kg (192 lb)       Vital Signs (abnormal): WNL    Abnormal Labs: Bun/ Cr = 36/ 2 91    Diagnostic Test Results: CT Abd/ Pelvis - Findings consistent with distal small bowel obstruction with condition point within the right lower quadrant though exact point is difficult to discern due to lack of oral contrast   There is mesenteric engorgement in the region of decompressed bowel, which could indicate developing vascular compromise  There are no small bowel findings to suggest ischemia        ED Treatment:   Medication Administration from 10/06/2018 2008 to 10/06/2018 2323       Date/Time Order Dose Route Action     10/06/2018 2142 fentanyl citrate (PF) 100 MCG/2ML 50 mcg 50 mcg Intravenous Given     10/06/2018 2142 ondansetron (ZOFRAN) injection 4 mg 4 mg Intravenous Given     10/06/2018 2142 sodium chloride 0 9 % bolus 500 mL 500 mL Intravenous New Bag     10/06/2018 2307 heparin (porcine) 25,000 units in 250 mL infusion (premix) 18 Units/kg/hr Intravenous New Bag          Past Medical/Surgical History:    Active Ambulatory Problems     Diagnosis Date Noted    CKD (chronic kidney disease) stage 4, GFR 15-29 ml/min ([facility]) 10/07/2016    Chronic saddle pulmonary embolism ([facility] [address] ) 10/07/2016    Bladder mass 10/07/2016    Small bowel obstruction ([facility] [address] ) 02/09/2018    Polycythemia 04/24/2018    Obstructive uropathy 05/17/2018    Secondary hyperparathyroidism of renal origin ([facility] [address] ) 10/05/2018     Resolved Ambulatory Problems     Diagnosis Date Noted    Chronic kidney disease (CKD), stage IV (severe) ([facility])      Past Medical History:   Diagnosis Date    Anxiety     Chronic kidney disease (CKD), stage IV (severe) (HCC)     Chronic thrombosis of subclavian vein (HCC)     Hydronephrosis     Hypertension     Hypothyroid     Incontinence     Lung mass     [facility]a [facility] [address] [facility]     Pulmonary embolism ([facility] [address] ) 2014    Urinary tract infection        Admitting Diagnosis: Abdominal pain [R10 9]    Age/Sex: 68 y o  female    Assessment/Plan:   80y Female to ED with Small Bowel Obstruction, likely from adhesions  NPO  NGT to suction for decompression  IVF  Monitor and replete electrolytes  Pain control  Serial abdominal exams  Heparin drip for history of pulmonary embolism   Hold Eliquis    Admission Orders:  NPO  NGT   PT/OT eval and treat    Scheduled Meds:     Continuous Infusions:   heparin (porcine) 3-30 Units/kg/hr (Order-Specific) Last Rate: 15 Units/kg/hr (10/07/18 0845)   sodium chloride 125 mL/hr Last Rate: 125 mL/hr (10/07/18 0850)     PRN Meds:   HYDROmorphone    metoprolol    Ondansetron Iv x1

## 2018-10-07 NOTE — ED PROVIDER NOTES
History  Chief Complaint   Patient presents with    Abdominal Pain     Pt present to ed with c/o abdominal pain and episodes of vomiting  Pt reports feels like it did when she was here for a blockage  67 yo F presents to ED for abdominal pain and vomiting that started at 5 pm, feels like previous SBO  Pt has history of ostomy that was "done in 2016 because I have bad IBS" and cholecystectomy  Previous SBO in Feb this year  Pt says she has not had output from the ostomy since this AM  Not passing gas  She reports frequent belching this afternoon and some nausea, and then at 5 pm abruptly started to have diffuse abdominal pain and NBNB vomiting  She says abdominal pain improves after vomiting  No fever/chills  Prior to Admission Medications   Prescriptions Last Dose Informant Patient Reported? Taking? Cholecalciferol (VITAMIN D3) 1000 UNITS CAPS  Self Yes Yes   Sig: Take 1,000 unit marking on U-100 syringe by mouth daily  JAKAFI 10 MG tablet   No Yes   Sig: TAKE 1 TABLET BY MOUTH ONE TIME DAILY     WELCHOL 625 MG tablet  Self Yes Yes   Sig: TAKE 1 TABLET AT BEDTIME AT 8PM   amLODIPine (NORVASC) 10 mg tablet  Self Yes Yes   Sig: Take 10 mg by mouth daily     apixaban (ELIQUIS) 2 5 mg  Self Yes Yes   Sig: Take 1 tablet by mouth daily   calcitriol (ROCALTROL) 0 25 mcg capsule   No Yes   Sig: Take 1 capsule (0 25 mcg total) by mouth every other day for 30 days Take 1 tablet 3 times a week (Monday, Wednesday, Friday)   citalopram (CeleXA) 20 mg tablet  Self Yes Yes   levothyroxine 75 mcg tablet   No No   Sig: Take 1 tablet by mouth daily in the early morning for 30 days   levothyroxine 75 mcg tablet  Self Yes Yes   Sig: Take 75 mcg by mouth daily   metoprolol tartrate (LOPRESSOR) 25 mg tablet  Self Yes Yes   Sig: Take 0 5 tablets by mouth 2 (two) times a day   saccharomyces boulardii (FLORASTOR) 250 mg capsule  Self Yes Yes   Sig: Take 1 capsule by mouth daily     sodium bicarbonate 650 mg tablet No Yes   Sig: Take 1 tablet (650 mg total) by mouth 2 (two) times a day      Facility-Administered Medications: None       Past Medical History:   Diagnosis Date    Anxiety     Chronic kidney disease (CKD), stage IV (severe) (HCC)     stage IV    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    Hydronephrosis     Hypertension     Hypothyroid     Incontinence     Lung mass     Improving on PET/CT 1/2016    [facility] vera ([facility] )     Pulmonary embolism ([facility] ) 2014    Urinary tract infection        Past Surgical History:   Procedure Laterality Date    BLADDER SUSPENSION      BOTOX INJECTION N/A 7/27/2016    Procedure: BOTOX INJECTION ;  Surgeon: Timoteo Franco MD;  [facility];  Service:     CHOLECYSTECTOMY N/A     COLONOSCOPY      CYSTECTOMY, RADICAL WITH ILEOCONDUIT N/A 10/4/2016    Procedure: [address] [place] [address] ;  Surgeon: Timoteo Franco MD;  [facility];  Service:    Jory Taylor W/ RETROGRADES Bilateral 7/27/2016    Procedure: Evgeny Foy; RETROGRADE PYELOGRAM ;  Surgeon: Timoteo Franco MD;  [facility];  Service:     AR COLONOSCOPY FLX DX W/COLLJ SPEC WHEN PFRMD N/A 8/31/2016    Procedure: COLONOSCOPY;  Surgeon: Jess Barrera MD;  Location: [facility]; Service: Gastroenterology    AR CYSTOSCOPY,INSERT URETERAL STENT Bilateral 7/27/2016    Procedure: STENT INSERTION; EXCISION OF MESH ;  Surgeon: Timoteo Franco MD;  [facility];  Service: Urology    TONSILLECTOMY      TUBAL LIGATION      WISDOM TOOTH EXTRACTION         Family History   Problem Relation Age of Onset    Cancer Mother         small cell cancer     Heart disease Father     COPD Father     Heart disease Brother     Nephrolithiasis Brother      I have reviewed and agree with the history as documented      Social History   Substance Use Topics    Smoking status: Never Smoker    Smokeless tobacco: Never Used    Alcohol use Yes      Comment: occasionally Review of Systems   Constitutional: Negative for activity change, chills and fever  HENT: Negative for congestion, rhinorrhea, sore throat and trouble swallowing  Eyes: Negative for pain, discharge and visual disturbance  Respiratory: Negative for cough, chest tightness and shortness of breath  Cardiovascular: Negative for chest pain, palpitations and leg swelling  Gastrointestinal: Positive for abdominal pain, nausea and vomiting  Negative for blood in stool  Genitourinary: Negative for dysuria, frequency and urgency  Musculoskeletal: Negative for gait problem, neck pain and neck stiffness  Skin: Negative for color change, rash and wound  Neurological: Negative for dizziness, syncope, light-headedness and headaches  Physical Exam  ED Triage Vitals   Temperature Pulse Respirations Blood Pressure SpO2   10/06/18 2016 10/06/18 2016 10/06/18 2016 10/06/18 2016 10/06/18 2016   97 5 °F (36 4 °C) 66 20 167/96 96 %      Temp Source Heart Rate Source Patient Position - Orthostatic VS BP Location FiO2 (%)   10/06/18 2016 10/06/18 2016 10/06/18 2016 10/06/18 2201 --   Oral Monitor Sitting Left arm       Pain Score       10/06/18 2016       Worst Possible Pain           Orthostatic Vital Signs  Vitals:    10/06/18 2100 10/06/18 2120 10/06/18 2157 10/06/18 2201   BP: 149/78 159/79 156/73 156/73   Pulse: 62  62 64   Patient Position - Orthostatic VS:           Physical Exam   Constitutional: She is oriented to person, place, and time  She appears well-developed and well-nourished  No distress  HENT:   Head: Normocephalic and atraumatic  Mouth/Throat: Oropharynx is clear and moist    Eyes: Conjunctivae and EOM are normal  Right eye exhibits no discharge  Left eye exhibits no discharge  Neck: Neck supple  Cardiovascular: Normal rate, regular rhythm and intact distal pulses  Pulmonary/Chest: Effort normal and breath sounds normal  No stridor  No respiratory distress   She exhibits no tenderness  Abdominal: Soft  There is tenderness (diffuse)  There is no rebound and no guarding  Ostomy normal appearing, no stool in bag   Musculoskeletal: Normal range of motion  She exhibits no edema or tenderness  Neurological: She is alert and oriented to person, place, and time  No sensory deficit  She exhibits normal muscle tone  Skin: Skin is warm and dry  Capillary refill takes less than 2 seconds  Nursing note and vitals reviewed  ED Medications  Medications   sodium chloride 0 9 % infusion (not administered)   ondansetron (ZOFRAN) injection 4 mg (not administered)   HYDROmorphone (DILAUDID) injection 0 5 mg (not administered)   metoprolol (LOPRESSOR) injection 5 mg (not administered)   heparin (porcine) 25,000 units in 250 mL infusion (premix) (18 Units/kg/hr × 85 kg (Order-Specific) Intravenous New Bag 10/6/18 2307)   fentanyl citrate (PF) 100 MCG/2ML 50 mcg (50 mcg Intravenous Given 10/6/18 2142)   ondansetron (ZOFRAN) injection 4 mg (4 mg Intravenous Given 10/6/18 2142)   sodium chloride 0 9 % bolus 500 mL (500 mL Intravenous New Bag 10/6/18 2142)       Diagnostic Studies  Results Reviewed     Procedure Component Value Units Date/Time    APTT [[id number]]     Lab Status:  No result Specimen:  Blood     Protime-INR [[id number]]     Lab Status:  No result Specimen:  Blood     Lactic acid, plasma [[id number]]  (Normal) Collected:  10/06/18 2156    Lab Status:  Final result Specimen:  Blood from Arm, Right Updated:  10/06/18 2225     LACTIC ACID 1 6 mmol/L     Narrative:         Result may be elevated if tourniquet was used during collection      Troponin I [[id number]]  (Normal) Collected:  10/06/18 2111    Lab Status:  Final result Specimen:  Blood from Arm, Right Updated:  10/06/18 2218     Troponin I <0 02 ng/mL     Comprehensive metabolic panel [[id number]]  (Abnormal) Collected:  10/06/18 2111    Lab Status:  Final result Specimen:  Blood from Arm, Right Updated:  10/06/18 2141     Sodium 138 mmol/L      Potassium 4 2 mmol/L      Chloride 106 mmol/L      CO2 23 mmol/L      ANION GAP 9 mmol/L      BUN 36 (H) mg/dL      Creatinine 2 91 (H) mg/dL      Glucose 172 (H) mg/dL      Calcium 10 0 mg/dL      AST 22 U/L      ALT 13 U/L      Alkaline Phosphatase 71 U/L      Total Protein 7 8 g/dL      Albumin 4 1 g/dL      Total Bilirubin 0 58 mg/dL      eGFR 16 ml/min/1 73sq m     Narrative:         National Kidney Disease Education Program recommendations are as follows:  GFR calculation is accurate only with a steady state creatinine  Chronic Kidney disease less than 60 ml/min/1 73 sq  meters  Kidney failure less than 15 ml/min/1 73 sq  meters      Lipase [[id number]]  (Normal) Collected:  10/06/18 2111    Lab Status:  Final result Specimen:  Blood from Arm, Right Updated:  10/06/18 2141     Lipase 383 u/L     CBC and differential [[id number]]  (Abnormal) Collected:  10/06/18 2111    Lab Status:  Final result Specimen:  Blood from Arm, Right Updated:  10/06/18 2121     WBC 8 26 Thousand/uL      RBC 4 56 Million/uL      Hemoglobin 13 4 g/dL      Hematocrit 40 8 %      MCV 90 fL      MCH 29 4 pg      MCHC 32 8 g/dL      RDW 14 6 %      MPV 9 6 fL      Platelets 145 Thousands/uL      nRBC 0 /100 WBCs      Neutrophils Relative 87 (H) %      Immat GRANS % 1 %      Lymphocytes Relative 6 (L) %      Monocytes Relative 4 %      Eosinophils Relative 1 %      Basophils Relative 1 %      Neutrophils Absolute 7 26 Thousands/µL      Immature Grans Absolute 0 04 Thousand/uL      Lymphocytes Absolute 0 53 (L) Thousands/µL      Monocytes Absolute 0 32 Thousand/µL      Eosinophils Absolute 0 04 Thousand/µL      Basophils Absolute 0 07 Thousands/µL     POCT urinalysis dipstick [[id number]]     Lab Status:  No result Specimen:  Urine                  CT abdomen pelvis wo contrast   Final Result by Malachi Darling MD (10/06 2147)      Findings consistent with distal small bowel obstruction with condition point within the right lower quadrant though exact point is difficult to discern due to lack of oral contrast   There is mesenteric engorgement in the region of decompressed bowel,    which could indicate developing vascular compromise  There are no small bowel findings to suggest ischemia  I personally discussed this study with Lilia Day on 10/6/2018 at 9:46 PM                Workstation performed: [id number]               Procedures  Procedures      Phone Consults  ED Phone Contact    ED Course                               MDM  Number of Diagnoses or Management Options  Diagnosis management comments: CT confirms SBO  Normal lactic acid  Consult surgery    CritCare Time    Disposition  Final diagnoses:   None     ED Disposition     None      Follow-up Information    None         Current Discharge Medication List      CONTINUE these medications which have NOT CHANGED    Details   amLODIPine (NORVASC) 10 mg tablet Take 10 mg by mouth daily        apixaban (ELIQUIS) 2 5 mg Take 1 tablet by mouth daily      calcitriol (ROCALTROL) 0 25 mcg capsule Take 1 capsule (0 25 mcg total) by mouth every other day for 30 days Take 1 tablet 3 times a week (Monday, Wednesday, Friday)  Qty: 15 capsule, Refills: 5    Associated Diagnoses: Secondary hyperparathyroidism of renal origin ([facility] [address] )      Cholecalciferol (VITAMIN D3) 1000 UNITS CAPS Take 1,000 unit marking on U-100 syringe by mouth daily  citalopram (CeleXA) 20 mg tablet       JAKAFI 10 MG tablet TAKE 1 TABLET BY MOUTH ONE TIME DAILY    Qty: 30 tablet, Refills: 5    Associated Diagnoses: Polycythemia vera ([facility] )      levothyroxine 75 mcg tablet Take 75 mcg by mouth daily  Refills: 3      metoprolol tartrate (LOPRESSOR) 25 mg tablet Take 0 5 tablets by mouth 2 (two) times a day      saccharomyces boulardii (FLORASTOR) 250 mg capsule Take 1 capsule by mouth daily        sodium bicarbonate 650 mg tablet Take 1 tablet (650 mg total) by mouth 2 (two) times a day  Qty: 60 tablet, Refills: 5 Associated Diagnoses: CKD (chronic kidney disease), stage IV (HCC)      WELCHOL 625 MG tablet TAKE 1 TABLET AT BEDTIME AT 8PM  Refills: 2           No discharge procedures on file  ED Provider  Attending physically available and evaluated Anaya Fowler I managed the patient along with the ED Attending      Electronically Signed by         Jefferson Flores MD  10/06/18 2910

## 2018-10-08 VITALS
OXYGEN SATURATION: 94 % | DIASTOLIC BLOOD PRESSURE: 100 MMHG | HEIGHT: 60 IN | RESPIRATION RATE: 16 BRPM | SYSTOLIC BLOOD PRESSURE: 149 MMHG | WEIGHT: 192 LBS | HEART RATE: 70 BPM | BODY MASS INDEX: 37.69 KG/M2 | TEMPERATURE: 98.6 F

## 2018-10-08 LAB
ANION GAP SERPL CALCULATED.3IONS-SCNC: 6 MMOL/L (ref 4–13)
APTT PPP: 166 SECONDS (ref 24–36)
ATRIAL RATE: 64 BPM
BUN SERPL-MCNC: 22 MG/DL (ref 5–25)
CALCIUM SERPL-MCNC: 7.9 MG/DL (ref 8.3–10.1)
CHLORIDE SERPL-SCNC: 111 MMOL/L (ref 100–108)
CO2 SERPL-SCNC: 22 MMOL/L (ref 21–32)
CREAT SERPL-MCNC: 2.25 MG/DL (ref 0.6–1.3)
GFR SERPL CREATININE-BSD FRML MDRD: 21 ML/MIN/1.73SQ M
GLUCOSE SERPL-MCNC: 90 MG/DL (ref 65–140)
INR PPP: 1.1 (ref 0.86–1.17)
MAGNESIUM SERPL-MCNC: 1.9 MG/DL (ref 1.6–2.6)
P AXIS: 30 DEGREES
POTASSIUM SERPL-SCNC: 3.9 MMOL/L (ref 3.5–5.3)
PR INTERVAL: 140 MS
PROTHROMBIN TIME: 14.3 SECONDS (ref 11.8–14.2)
QRS AXIS: 22 DEGREES
QRSD INTERVAL: 78 MS
QT INTERVAL: 396 MS
QTC INTERVAL: 408 MS
SODIUM SERPL-SCNC: 139 MMOL/L (ref 136–145)
T WAVE AXIS: 34 DEGREES
VENTRICULAR RATE: 64 BPM

## 2018-10-08 PROCEDURE — G8978 MOBILITY CURRENT STATUS: HCPCS

## 2018-10-08 PROCEDURE — 80048 BASIC METABOLIC PNL TOTAL CA: CPT | Performed by: SURGERY

## 2018-10-08 PROCEDURE — 83735 ASSAY OF MAGNESIUM: CPT | Performed by: SURGERY

## 2018-10-08 PROCEDURE — 93010 ELECTROCARDIOGRAM REPORT: CPT | Performed by: INTERNAL MEDICINE

## 2018-10-08 PROCEDURE — 99238 HOSP IP/OBS DSCHRG MGMT 30/<: CPT | Performed by: PHYSICIAN ASSISTANT

## 2018-10-08 PROCEDURE — 85610 PROTHROMBIN TIME: CPT | Performed by: SURGERY

## 2018-10-08 PROCEDURE — 97162 PT EVAL MOD COMPLEX 30 MIN: CPT

## 2018-10-08 PROCEDURE — 85730 THROMBOPLASTIN TIME PARTIAL: CPT | Performed by: SURGERY

## 2018-10-08 PROCEDURE — G8980 MOBILITY D/C STATUS: HCPCS

## 2018-10-08 PROCEDURE — G8979 MOBILITY GOAL STATUS: HCPCS

## 2018-10-08 RX ORDER — MELATONIN
1000 DAILY
Status: DISCONTINUED | OUTPATIENT
Start: 2018-10-08 | End: 2018-10-08 | Stop reason: HOSPADM

## 2018-10-08 RX ORDER — AMLODIPINE BESYLATE 10 MG/1
10 TABLET ORAL DAILY
Status: DISCONTINUED | OUTPATIENT
Start: 2018-10-08 | End: 2018-10-08 | Stop reason: HOSPADM

## 2018-10-08 RX ORDER — LEVOTHYROXINE SODIUM 0.07 MG/1
75 TABLET ORAL
Status: DISCONTINUED | OUTPATIENT
Start: 2018-10-08 | End: 2018-10-08 | Stop reason: HOSPADM

## 2018-10-08 RX ORDER — CITALOPRAM 20 MG/1
20 TABLET ORAL DAILY
Status: DISCONTINUED | OUTPATIENT
Start: 2018-10-08 | End: 2018-10-08 | Stop reason: HOSPADM

## 2018-10-08 RX ORDER — ACETAMINOPHEN 325 MG/1
650 TABLET ORAL EVERY 6 HOURS PRN
Qty: 30 TABLET | Refills: 0
Start: 2018-10-08 | End: 2018-11-07

## 2018-10-08 RX ADMIN — APIXABAN 2.5 MG: 2.5 TABLET, FILM COATED ORAL at 10:03

## 2018-10-08 RX ADMIN — METOPROLOL TARTRATE 12.5 MG: 25 TABLET ORAL at 10:04

## 2018-10-08 RX ADMIN — AMLODIPINE BESYLATE 10 MG: 10 TABLET ORAL at 10:03

## 2018-10-08 RX ADMIN — VITAMIN D, TAB 1000IU (100/BT) 1000 UNITS: 25 TAB at 10:03

## 2018-10-08 RX ADMIN — CITALOPRAM HYDROBROMIDE 20 MG: 20 TABLET ORAL at 10:03

## 2018-10-08 RX ADMIN — SODIUM CHLORIDE 125 ML/HR: 0.9 INJECTION, SOLUTION INTRAVENOUS at 01:46

## 2018-10-08 RX ADMIN — SODIUM CHLORIDE 125 ML/HR: 0.9 INJECTION, SOLUTION INTRAVENOUS at 07:42

## 2018-10-08 NOTE — NURSING NOTE
Pt complaining of 5/10 abd pain  Pts only PRN is IV dilaudid   Resident paged to place order for oral medication now that NG has been D/C

## 2018-10-08 NOTE — PHYSICAL THERAPY NOTE
Physical Therapy Initial Evaluation   Zina Marina, PT   10/08/18 1035   Note Type   Note type Eval only   Pain Assessment   Pain Assessment 0-10   Pain Score No Pain   Home Living   Type of Home House   Home Layout Two level;Bed/bath upstairs   Bathroom Shower/Tub Walk-in shower   Bathroom Equipment Built-in shower seat;Commode   Home Equipment Walker  (RW  Pt not using AD for ambulation PTA  )   Prior Function   Level of Reed Independent with ADLs and functional mobility; Needs assistance with IADLs  (Disabled adult son helps with laundry  )   Lives With Son;Family  (Son has high func autism   Home with pt 24/7  )   Receives Help From Family  (Son very helpful for assist with chores as per pt  )   ADL Assistance Independent   IADLs Needs assistance   Falls in the last 6 months 0   Vocational Retired   Comments + drives and shops  Restrictions/Precautions   Weight Bearing Precautions Per Order No   Braces or Orthoses Other (Comment)  (none)   Other Precautions Multiple lines   General   Additional Pertinent History dx: adm with SBO  Treated conservatively  PMH: CKD4, chronic saddle pulm elbolism, polycythemia, obstructive uropathy with ileal conduit, secondary hyperparathyroidism of renal origin, hypothyroidism, HTN, iSBO 2/2018 treated conservatively, lung mass, bladder suspension surgery  Family/Caregiver Present No   Cognition   Overall Cognitive Status WFL   Arousal/Participation Alert   Orientation Level Oriented X4   Memory Within functional limits   Following Commands Follows all commands and directions without difficulty   Comments pleasant, cooperative, willing to be mobile  RLE Assessment   RLE Assessment WFL   LLE Assessment   LLE Assessment WFL   Coordination   Movements are Fluid and Coordinated 1   Sensation WFL   Bed Mobility   Supine to Sit 6  Modified independent   Additional Comments Pt returned to sitting OOB in recliner after walking in hallway    Call bell in easy access on side of bed  Transfers   Sit to Stand 7  Independent   Toilet transfer 7  Independent   Additional Comments No AD used for mobility  Pt with good safety  Ambulation/Elevation   Gait pattern Narrow MITRA; Excessively slow   Gait Assistance 5  Supervision   Additional items Assist x 1   Assistive Device None   Distance 160'   Stair Management Assistance 5  Supervision   Additional items Assist x 1   Stair Management Technique One rail R;Nonreciprocal   Number of Stairs 7   Balance   Static Sitting Good   Dynamic Sitting Fair +   Static Standing Fair +   Dynamic Standing Fair +   Ambulatory Fair   Endurance Deficit   Endurance Deficit Yes   Endurance Deficit Description generalized fatigue  Activity Tolerance   Activity Tolerance Patient limited by fatigue   Medical Staff Made Aware RN consented to allow pt to participate in PT eval      Nurse Made Aware yes   Assessment   Prognosis Good   Problem List Decreased endurance; Impaired balance;Decreased mobility; Decreased skin integrity   Assessment Pt is 68 y o  female seen for PT evaluation s/p admit to [facility] on 10/6/2018 w/ Small bowel obstruction ([facility] [address] )  PT consulted to assess pt's functional mobility and d/c needs  Order placed for PT eval and tx, w/ up as tolerated order  Comorbidities affecting pt's physical performance at time of assessment include: Pt with a hx of CKD4, chronic saddle pulm embolism, polycythemia, obstructive uropathy with ileal conduit, prior SBO, lung mass, bladder suspension surgery  PTA, pt was independent w/ all functional mobility w/ no need for an assistive device for ambulation, ambulates community distances and elevations, has 4 MARLON, lives w/ with family members in 2 level home  and does drive independently   Personal factors affecting pt at time of IE include: lives in 2 story house, stairs to enter home, inability to navigate community distances, limited home support and is functioning at a slightly below baseline level of mobility at this time    Please find objective findings from PT assessment regarding body systems outlined above with impairments and limitations including impaired balance, decreased endurance and decreased functional mobility tolerance  The following objective measures performed on IE also reveal limitations: Barthel Index: 70/100  Pt's clinical presentation is currently evolving seen in pt's presentation of having pending and some abnormal lab values, having ongoing health issues including CKD4, ileal conduit, chronic pulm embolism, and polycythemia    Pt to benefit from continued PT tx to address deficits as defined above and maximize level of functional independent mobility and consistency  From PT/mobility standpoint, recommendation at time of d/c would be home with intermittent family support pending progress in order to facilitate return to PLOF     Goals   Treatment Day 0   Plan   Treatment/Interventions Spoke to case management;Spoke to nursing   PT Frequency (disocontinue PT rx's  )   Recommendation   Recommendation Home with family support;D/C PT   Equipment Recommended (none)   PT - OK to Discharge Yes   Barthel Index   Feeding 0   Bathing 5   Grooming Score 5   Dressing Score 10   Bladder Score 0   Bowels Score 10   Toilet Use Score 10   Transfers (Bed/Chair) Score 15   Mobility (Level Surface) Score 10   Stairs Score 5   Barthel Index Score 70

## 2018-10-08 NOTE — ASSESSMENT & PLAN NOTE
- Small-bowel obstruction out appears to be resolved with return of bowel function and toleration of oral diet  - Continue diet as tolerated  - Non-opioid oral analgesics as needed for any residual pain  - Stable for discharge  Outpatient follow-up with PCP and surgery service in the next 2 weeks and/or as needed for re-evaluation

## 2018-10-08 NOTE — PROGRESS NOTES
Nurse-Patient- Provider rounds were completed with the patient's nurse today, Wiliam Ano  We discussed the plan is to trial regular diet this afternoon  Continue to follow abdominal exam bowel function  Tolerates diet with no further symptoms, consider discharge later this afternoon  We reviewed all of the invasive devices/lines/telemetry orders   - None  Pain Assessment / Plan:  - Continue current analgesic regimen as needed  Mobility Assessment / Plan:  - Activity as tolerated  Appreciate therapy evaluation and recommendations for discharge home with family support  Goals / Barriers for discharge:  - Hopeful for discharge later today versus tomorrow pending continued improvement and toleration of oral diet  - Case management following; case and discharge needs discussed  All questions and concerns were addressed  I spent greater than 15 minutes reviewing the plan with the patient and the nurse, and coordinating care for the day      Abbie Tello PA-C  10/8/2018 1:43 PM

## 2018-10-08 NOTE — PROGRESS NOTES
Patient resting comfortably at this time, no complaints of chest pain or SOB  Call bell and personal care items are within reach  Will continue to monitor

## 2018-10-08 NOTE — PROGRESS NOTES
Progress Note - General Surgery   Sharon Granda 68 y o  female MRN: [id number]  Unit/Bed#: [facility] 607-01 Encounter: [id number]    Assessment:  70 y/o F w/ hx of SBO, p/w SBO, now having bowel movements    Plan:  --NPO w/ sips of clears, ADAT  --Heparin gtt  --[email or web address]  --Pain control  --OOB, ambulate  --PT/OT    Subjective/Objective      Subjective:     No acute events overnight  Patient reported 1 bowel movement overnight and is passing gas  Since the NG tube was removed yesterday, patient denies any nausea or vomiting  Tolerating sips of clears  Denies any abdominal pain  Objective:     Blood pressure 150/89, pulse 70, temperature 98 4 °F (36 9 °C), temperature source Oral, resp  rate 20, height 5' (1 524 m), weight 87 1 kg (192 lb), SpO2 94 %  ,Body mass index is 37 5 kg/m²  I/O       10/06 0701 - 10/07 0700 10/07 0701 - 10/08 0700    P  O   120    Total Intake(mL/kg)  120 (1 4)    Urine (mL/kg/hr) 850 1600 (0 8)    Emesis/NG output 50 200    Total Output 900 1800    Net -900 -1680          Unmeasured Stool Occurrence  1 x            Invasive Devices     Peripheral Intravenous Line            Peripheral IV 10/07/18 Right Arm 1 day          Drain            Urostomy Ileal conduit  days                Physical Exam:    GEN: NAD  HEENT: MMM  CV: RRR  Lung: normal effort  Ab: Soft, NT/ND, ileal conduit site pink and healthy w/ urine draining in bag  Extrem: No CCE  Neuro:  A+Ox3, motor and sensation grossly intact      Lab, Imaging and other studies:  CBC:   Lab Results   Component Value Date    WBC 9 46 10/07/2018    HGB 11 4 (L) 10/07/2018    HCT 35 7 10/07/2018    MCV 92 10/07/2018     10/07/2018    MCH 29 5 10/07/2018    MCHC 31 9 10/07/2018    RDW 14 6 10/07/2018    MPV 9 8 10/07/2018    NRBC 0 10/07/2018   , CMP:   Lab Results   Component Value Date     10/07/2018    K 4 0 10/07/2018     (H) 10/07/2018    CO2 21 10/07/2018    BUN 34 (H) 10/07/2018    CREATININE 2 53 (H) 10/07/2018    CALCIUM 8 8 10/07/2018    EGFR 18 10/07/2018   , Coagulation: No results found for: PT, INR, APTT, Urinalysis: No results found for: [place] Fraction, SPECGRAV, PHUR, LEUKOCYTESUR, NITRITE, PROTEINUA, GLUCOSEU, KETONESU, BILIRUBINUR, BLOODU, Amylase: No results found for: AMYLASE, Lipase: No results found for: LIPASE  VTE Pharmacologic Prophylaxis: Heparin gtt  VTE Mechanical Prophylaxis: sequential compression device

## 2018-10-08 NOTE — DISCHARGE SUMMARY
Discharge Summary Note - Anaya Fowler [birth year], 68 y o  female MRN: [id number]    Unit/Bed#: [facility] 607-01 Encounter: [id number]    Primary Care Provider: Liana Lim MD   Date and time admitted to hospital: 10/6/2018  8:21 PM        * Small bowel obstruction [facility])   Assessment & Plan    - Small-bowel obstruction out appears to be resolved with return of bowel function and toleration of oral diet  - Continue diet as tolerated  - Non-opioid oral analgesics as needed for any residual pain  - Stable for discharge  Outpatient follow-up with PCP and surgery service in the next 2 weeks and/or as needed for re-evaluation  CKD (chronic kidney disease) stage 4, GFR 15-29 ml/min (HCC)   Assessment & Plan    - Avoid nephrotoxic medications as able  - Outpatient follow-up with primary care provider  Chronic saddle pulmonary embolism (HCC)   Assessment & Plan    - Eliquis resumed on 10/08/2018   - Continued outpatient follow-up as recommended by PCP  Hypertension   Assessment & Plan    - Continue home medication regimen  Hypothyroidism   Assessment & Plan    - Continue home medication regimen  Discharge Summary - General Surgery   Anaya Fowler 68 y o  female MRN: [id number]  Unit/Bed#: [facility] 895-26 Encounter: [id number]    Admission Date: 10/6/2018     Discharge Date: 10/8/2018    Admitting Diagnosis: Abdominal pain [R10 9]    Discharge Diagnosis: See above  Attending and Service: [facility]  Consulting Physician(s): None  Imaging and Procedures Performed:     Ct Abdomen Pelvis Wo Contrast    Result Date: 10/6/2018  Impression: Findings consistent with distal small bowel obstruction with condition point within the right lower quadrant though exact point is difficult to discern due to lack of oral contrast   There is mesenteric engorgement in the region of decompressed bowel, which could indicate developing vascular compromise    There are no small bowel findings to suggest ischemia  I personally discussed this study with Desiree Tara on 10/6/2018 at 9:46 PM  Workstation performed: [id number]     Pathology: N/A    Hospital Course: Genesis Britt is a 69-year-old female who presented with abdominal pain associated with nausea and bilious vomiting  Her pain was mostly on the right side without any alleviating or exacerbating factors  She described her presenting symptoms as similar to a prior bowel obstruction in February of 2018  She noted that that bowel obstruction was managed conservatively  She denied any other associated fevers or chills  She noted her last bowel movement was earlier in the day as well as passing some flatus  On her initial evaluation, she was hypertensive with a blood pressure in the 160s over 90s with normal vital signs otherwise; her abdomen was soft, but distended with mild right-sided abdominal tenderness, but no evidence of peritonitis; there was a viable appearing stoma consistent with her known ileal conduit in the right lower quadrant with clear yellow urine in the drainage bag; the remainder of her exam was unremarkable  Her initial workup included the above-noted CT scan of the abdomen and pelvis  She was admitted to the acute care surgery service with a small-bowel obstruction, likely secondary to adhesions  She was kept NPO, and NG tube was placed for bowel decompression and kept to low continuous wall suction, she was started on IV fluid resuscitation as well as an analgesic regimen as needed, she would undergo serial abdominal exams and labs to monitor for improvement and stability in her electrolytes  Due to history of pulmonary embolism, she was anticoagulated with a heparin drip while her home Eliquis was held  Over the next couple of days, she had improvement in her abdominal pain and nausea with bowel decompression via the NG tube  She had return of normal bowel function    Her NG tube output had declined was able to be discontinued  Following removal of the NG tube, she tolerated initially a clear liquid diet followed by regular diet with continued bowel function and no recurrence of her symptoms  She was evaluated by PT and cleared for discharge home with family support  By 10/08/2018 PM she is deemed stable for discharge  On discharge, the patient is instructed to follow-up with the patient's primary care provider within the next 2 weeks to review the events of the recent hospitalization  The patient is instructed to follow-up with the [facility] as needed  The patient is instructed to follow the provided discharge instructions  Condition at Discharge: good     Discharge instructions/Information to patient and family:   See after visit summary for information provided to patient and family  Provisions for Follow-Up Care:  See after visit summary for information related to follow-up care and any pertinent home health orders  Disposition: See After Visit Summary for discharge disposition information  Planned Readmission: No    Discharge Statement   I spent 25 minutes discharging the patient  This time was spent on the day of discharge  I had direct contact with the patient on the day of discharge  Additional documentation is required if more than 30 minutes were spent on discharge  Discharge Medications:  See after visit summary for reconciled discharge medications provided to patient and family      Madhuri Cook PA-C  10/8/2018  1:47 PM

## 2018-10-08 NOTE — DISCHARGE INSTRUCTIONS
Acute Care Surgery Discharge Instructions    Please follow-up as instructed or on an as needed basis  If you need a follow-up appointment, please call the office when you leave to schedule an appointment  Activity:  - You may resume activity as tolerated  Diet:    - You may resume your normal diet  Medications:  - You may resume all of your regular medications, including blood thinners and aspirin, after going home unless otherwise instructed  Please refer to your discharge medication list for further details  - Please take the pain medications as directed  - You may become constipated, especially if taking pain medications  You may take any over the counter stool softeners or laxatives as needed  Examples: Milk of Magnesia, Colace, Senna  Additional Instructions:  - May shower daily and/or bathe normally  - If you have any questions or concerns after discharge please call the office   - Call office or return to ER if fever greater than 101, chills, persistent nausea/vomiting, and/or worsening/uncontrollable pain

## 2018-10-08 NOTE — OCCUPATIONAL THERAPY NOTE
Occupational Therapy         Patient Name: David Hutton  FSTWS'V Date: 10/8/2018    Pt admitted to [facility] with a small bowel obstruction  Per chart pt was/PLOF I with all ADL's, drives, assistance with homemaking skills from Son (has high functioning Autism)  Per notes/chart -pt continues with S/I with ADl's, son able to assist 24/7 with all ADL'/IADL's  Physical therapy will continue with mobility training  Pt does not require skilled OT at this time  Pt will have 24/7 family support upon discharge at home  Please re-consult if pt has any medical changes  Will D/C OT      Andre Camargo OT

## 2018-10-08 NOTE — PLAN OF CARE
DISCHARGE PLANNING - CARE MANAGEMENT     Discharge to post-acute care or home with appropriate resources Progressing        GASTROINTESTINAL - ADULT     Minimal or absence of nausea and/or vomiting Progressing     Maintains or returns to baseline bowel function Progressing     Maintains adequate nutritional intake Progressing        Prexisting or High Potential for Compromised Skin Integrity     Skin integrity is maintained or improved Progressing

## 2018-10-23 ENCOUNTER — OFFICE VISIT (OUTPATIENT)
Dept: HEMATOLOGY ONCOLOGY | Facility: CLINIC | Age: 73
End: 2018-10-23
Payer: COMMERCIAL

## 2018-10-23 VITALS
WEIGHT: 186 LBS | DIASTOLIC BLOOD PRESSURE: 78 MMHG | OXYGEN SATURATION: 96 % | HEIGHT: 60 IN | RESPIRATION RATE: 18 BRPM | TEMPERATURE: 97.6 F | HEART RATE: 60 BPM | BODY MASS INDEX: 36.52 KG/M2 | SYSTOLIC BLOOD PRESSURE: 142 MMHG

## 2018-10-23 DIAGNOSIS — I26.92 CHRONIC SADDLE PULMONARY EMBOLISM WITHOUT ACUTE COR PULMONALE (HCC): Chronic | ICD-10-CM

## 2018-10-23 DIAGNOSIS — D45 POLYCYTHEMIA VERA (HCC): Primary | ICD-10-CM

## 2018-10-23 DIAGNOSIS — I27.82 CHRONIC SADDLE PULMONARY EMBOLISM WITHOUT ACUTE COR PULMONALE (HCC): Chronic | ICD-10-CM

## 2018-10-23 PROCEDURE — 99214 OFFICE O/P EST MOD 30 MIN: CPT | Performed by: INTERNAL MEDICINE

## 2018-10-23 NOTE — PROGRESS NOTES
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, status post IVC filter removal in 12/2016, therapy switched to apixaban 2 5 mg p o  B i d   2  Constitutional symptoms, fatigue, pruritus, the patient was initiated on ruxolitinib 10 mg p o  B i d  Initially and later on 10 mg p o  Daily the initial treatment in June 2016, with excellent response and disappearance of splenomegaly and constitutional symptoms  3  Follow-up in 6 months with CBC, CMP           HPI:  #1  History of polycythemia vera diagnosed in 2006 with thrombocytosis, erythrocytosis, when she presented with hemoglobin of 20 6, hematocrit 54  Positive for CLARE 2 mutation diagnosis was done at [facility], on hydroxyurea  She also had phlebotomy previously  #2  Chronic left lower extremity deep venous thrombosis diagnosed in 2007, treated with Coumadin  #3  Large pulmonary embolus in the right upper lobe branches, on Coumadin to keep INR between 2 and 3 managed by Dr Aislinn Smalls  7/19/17: Currently on apixaban 2 5 mg by mouth daily,  7/19/2017: Status post cystectomy with ileal conduit 2nd to her chronic hydronephrosis  Status post removal of IVC filter 12/2016  #4   Because of constitutional symptoms, fatigue, pruritus, switching patient initiated on Ruxolitinib 10 mg by mouth twice a day in June 2016, she underwent bladder resection with ileostomy  She was admitted in February 2018 with small bowel obstruction and she was readmitted again in October 2018 with small bowel obstruction secondary to scar formation   Current treatment on ruxolitinib 10 mg p o  Daily, apixaban 2 5 mg p o  B i d  Interval History:        Previous Treatment:         Test Results:    Imaging: Ct Abdomen Pelvis Wo Contrast    Result Date: 10/6/2018  Narrative: CT ABDOMEN AND PELVIS WITHOUT IV CONTRAST INDICATION:   suspect obstruction  COMPARISON:  CT abdomen pelvis 2/7/2018 TECHNIQUE:  CT examination of the abdomen and pelvis was performed without intravenous contrast   Axial, sagittal, and coronal 2D reformatted images were created from the source data and submitted for interpretation  Radiation dose length product (DLP) for this visit:  1021 59 mGy-cm   This examination, like all CT scans performed in the [facility], was performed utilizing techniques to minimize radiation dose exposure, including the use of iterative reconstruction and automated exposure control  Enteric contrast was administered  FINDINGS: ABDOMEN LOWER CHEST:  No clinically significant abnormality identified in the visualized lower chest  LIVER/BILIARY TREE:  Unremarkable  GALLBLADDER:  Gallbladder is surgically absent  SPLEEN:  Unremarkable  PANCREAS:  Unremarkable  ADRENAL GLANDS:  Unremarkable  KIDNEYS/URETERS:  Marked atrophy of the left kidney is again seen  Nonobstructing calculus within the right kidney is present  STOMACH AND BOWEL:  There are dilated loops of small bowel within the abdomen with a transition point that seems to be within the right lower quadrant that the exact point is difficult to discern due to lack of oral contrast   Findings are consistent with distal small bowel obstruction  There is some mesenteric engorgement in the region of decompressed bowel  There is no fluid tracking along dilated small bowel  There is no pneumatosis  Large bowel is decompressed   APPENDIX:  No findings to suggest appendicitis  ABDOMINOPELVIC CAVITY:  No ascites or free intraperitoneal air  No lymphadenopathy  VESSELS:  Unremarkable for patient's age  PELVIS REPRODUCTIVE ORGANS:  Unremarkable for patient's age  URINARY BLADDER:  Postsurgical changes of cystectomy with ileal conduit is again noted  ABDOMINAL WALL/INGUINAL REGIONS:  Unremarkable  OSSEOUS STRUCTURES:  No acute fracture or destructive osseous lesion  Impression: Findings consistent with distal small bowel obstruction with condition point within the right lower quadrant though exact point is difficult to discern due to lack of oral contrast   There is mesenteric engorgement in the region of decompressed bowel, which could indicate developing vascular compromise  There are no small bowel findings to suggest ischemia  I personally discussed this study with Eliana Larkin on 10/6/2018 at 9:46 PM  Workstation performed: [id number]       Labs:   Lab Results   Component Value Date    WBC 9 46 10/07/2018    HGB 11 4 (L) 10/07/2018    HCT 35 7 10/07/2018    MCV 92 10/07/2018     10/07/2018     Lab Results   Component Value Date     10/08/2018    K 3 9 10/08/2018     (H) 10/08/2018    CO2 22 10/08/2018    ANIONGAP 9 12/17/2015    BUN 22 10/08/2018    CREATININE 2 25 (H) 10/08/2018    GLUCOSE 138 10/04/2016    GLUF 85 01/23/2018    CALCIUM 7 9 (L) 10/08/2018    AST 22 10/06/2018    ALT 13 10/06/2018    ALKPHOS 71 10/06/2018    PROT 6 2 (L) 11/06/2015    BILITOT 0 49 11/06/2015    EGFR 21 10/08/2018       Lab Results   Component Value Date    IRON 21 (L) 01/29/2016    TIBC 204 (L) 01/29/2016    FERRITIN 349 01/29/2016       No results found for: [id number]      ROS:   Review of Systems   Constitutional: Negative for activity change, appetite change, diaphoresis, fatigue, fever and unexpected weight change  HENT: Negative for facial swelling, hearing loss, rhinorrhea, sinus pain, sinus pressure, sneezing, sore throat and tinnitus  Eyes: Negative for photophobia, pain, discharge, redness, itching and visual disturbance  Respiratory: Negative for apnea and chest tightness  Cardiovascular: Negative for chest pain, palpitations and leg swelling  Gastrointestinal: Negative for abdominal distention, abdominal pain, blood in stool, constipation, diarrhea, nausea, rectal pain and vomiting  Endocrine: Negative for cold intolerance, heat intolerance, polydipsia and polyphagia  Genitourinary: Negative for difficulty urinating, dyspareunia, frequency, hematuria, pelvic pain and urgency  Musculoskeletal: Negative for arthralgias, back pain, gait problem, joint swelling and myalgias  Skin: Negative for color change, pallor and rash  Allergic/Immunologic: Negative for environmental allergies and food allergies  Neurological: Negative for dizziness, tremors, seizures, syncope, speech difficulty, numbness and headaches  Hematological: Negative for adenopathy  Does not bruise/bleed easily  Psychiatric/Behavioral: Negative for agitation, confusion, dysphoric mood, hallucinations and suicidal ideas  Current Medications: Reviewed  Allergies: Reviewed  PMH/FH/SH:  Reviewed      Physical Exam:    Body surface area is 1 81 meters squared  Wt Readings from Last 3 Encounters:   10/23/18 84 4 kg (186 lb)   10/06/18 87 1 kg (192 lb)   10/05/18 87 8 kg (193 lb 9 6 oz)        Temp Readings from Last 3 Encounters:   10/23/18 97 6 °F (36 4 °C) (Tympanic)   10/08/18 98 6 °F (37 °C)   04/24/18 98 3 °F (36 8 °C)        BP Readings from Last 3 Encounters:   10/23/18 142/78   10/08/18 149/100   06/20/18 130/80         Pulse Readings from Last 3 Encounters:   10/23/18 60   10/07/18 70   06/20/18 78        Physical Exam   Constitutional: She is oriented to person, place, and time  She appears well-developed and well-nourished  No distress  HENT:   Head: Normocephalic and atraumatic     Mouth/Throat: Oropharynx is clear and moist  No oropharyngeal exudate  Eyes: Pupils are equal, round, and reactive to light  Conjunctivae and EOM are normal    Neck: Normal range of motion  Neck supple  No JVD present  No tracheal deviation present  No thyromegaly present  Cardiovascular: Normal rate and regular rhythm  Exam reveals no gallop and no friction rub  No murmur heard  Pulmonary/Chest: Effort normal and breath sounds normal  No respiratory distress  She has no wheezes  She has no rales  She exhibits no tenderness  Abdominal: Soft  Bowel sounds are normal  She exhibits no distension and no mass  There is no tenderness  There is no rebound and no guarding  Ileostomy bag   Musculoskeletal: Normal range of motion  Lymphadenopathy:     She has no cervical adenopathy  Neurological: She is alert and oriented to person, place, and time  Skin: Skin is warm and dry  No rash noted  She is not diaphoretic  No erythema  No pallor  Psychiatric: She has a normal mood and affect  Her behavior is normal  Judgment and thought content normal    Vitals reviewed  Goals and Barriers:  Current Goal: Minimize effects of disease  Barriers: None  Patient's Capacity to Self Care:  Patient is able to self care      Code Status: @[facility] HospitalGIFTY@

## 2018-10-23 NOTE — LETTER
October 23, 2018     Moriah Cm MD  [id number] [place] [id number]    Patient: Carole Callahan   YOB: 1945   Date of Visit: 10/23/2018       Dear Dr Radha Balbuena: Thank you for referring Gina Nguyễn to me for evaluation  Below are my notes for this consultation  If you have questions, please do not hesitate to call me  I look forward to following your patient along with you  Sincerely,        Viktor Marx MD        CC: MD Viktor Dimas MD  10/23/2018 10:27 AM  Sign at close encounter  Hematology Outpatient Follow - Up Note  Carole Callahan 68 y o  female MRN: @ Encounter: [id number]        Date:  10/23/2018        Assessment/ Plan:    1  Polycythemia vera positive for JAK2 mutation diagnosed in 2006 with thrombocytosis, erythrocytosis with hemoglobin of 20 6, treated with hydroxyurea and phlebotomy along with baby aspirin   She was diagnosed with chronic left lower extremity DVT in 2007, treated with Coumadin and later on large pulmonary embolus in the right upper lobe, status post IVC filter in 2016 and then bladder removal secondary to 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, status post IVC filter removal in 12/2016, therapy switched to apixaban 2 5 mg p o  B i d   2  Constitutional symptoms, fatigue, pruritus, the patient was initiated on ruxolitinib 10 mg p o  B i d  Initially and later on 10 mg p o  Daily the initial treatment in June 2016, with excellent response and disappearance of splenomegaly and constitutional symptoms  3  Follow-up in 6 months with CBC, CMP           HPI:  #1  History of polycythemia vera diagnosed in 2006 with thrombocytosis, erythrocytosis, when she presented with hemoglobin of 20 6, hematocrit 54  Positive for CLARE 2 mutation diagnosis was done at [facility], on hydroxyurea  She also had phlebotomy previously    #2  Chronic left lower extremity deep venous thrombosis diagnosed in 2007, treated with Coumadin  #3  Large pulmonary embolus in the right upper lobe branches, on Coumadin to keep INR between 2 and 3 managed by Dr Jyoti Willett  7/19/17: Currently on apixaban 2 5 mg by mouth daily,  7/19/2017: Status post cystectomy with ileal conduit 2nd to her chronic hydronephrosis  Status post removal of IVC filter 12/2016  #4  Because of constitutional symptoms, fatigue, pruritus, switching patient initiated on Ruxolitinib 10 mg by mouth twice a day in June 2016, she underwent bladder resection with ileostomy  She was admitted in February 2018 with small bowel obstruction and she was readmitted again in October 2018 with small bowel obstruction secondary to scar formation   Current treatment on ruxolitinib 10 mg p o  Daily, apixaban 2 5 mg p o  B i d  Interval History:        Previous Treatment:         Test Results:    Imaging: Ct Abdomen Pelvis Wo Contrast    Result Date: 10/6/2018  Narrative: CT ABDOMEN AND PELVIS WITHOUT IV CONTRAST INDICATION:   suspect obstruction  COMPARISON:  CT abdomen pelvis 2/7/2018 TECHNIQUE:  CT examination of the abdomen and pelvis was performed without intravenous contrast   Axial, sagittal, and coronal 2D reformatted images were created from the source data and submitted for interpretation  Radiation dose length product (DLP) for this visit:  1021 59 mGy-cm   This examination, like all CT scans performed in the [facility], was performed utilizing techniques to minimize radiation dose exposure, including the use of iterative reconstruction and automated exposure control  Enteric contrast was administered  FINDINGS: ABDOMEN LOWER CHEST:  No clinically significant abnormality identified in the visualized lower chest  LIVER/BILIARY TREE:  Unremarkable  GALLBLADDER:  Gallbladder is surgically absent  SPLEEN:  Unremarkable  PANCREAS:  Unremarkable  ADRENAL GLANDS:  Unremarkable   KIDNEYS/URETERS:  Marked atrophy of the left kidney is again seen   Nonobstructing calculus within the right kidney is present  STOMACH AND BOWEL:  There are dilated loops of small bowel within the abdomen with a transition point that seems to be within the right lower quadrant that the exact point is difficult to discern due to lack of oral contrast   Findings are consistent with distal small bowel obstruction  There is some mesenteric engorgement in the region of decompressed bowel  There is no fluid tracking along dilated small bowel  There is no pneumatosis  Large bowel is decompressed  APPENDIX:  No findings to suggest appendicitis  ABDOMINOPELVIC CAVITY:  No ascites or free intraperitoneal air  No lymphadenopathy  VESSELS:  Unremarkable for patient's age  PELVIS REPRODUCTIVE ORGANS:  Unremarkable for patient's age  URINARY BLADDER:  Postsurgical changes of cystectomy with ileal conduit is again noted  ABDOMINAL WALL/INGUINAL REGIONS:  Unremarkable  OSSEOUS STRUCTURES:  No acute fracture or destructive osseous lesion  Impression: Findings consistent with distal small bowel obstruction with condition point within the right lower quadrant though exact point is difficult to discern due to lack of oral contrast   There is mesenteric engorgement in the region of decompressed bowel, which could indicate developing vascular compromise  There are no small bowel findings to suggest ischemia    I personally discussed this study with Natha Closs on 10/6/2018 at 9:46 PM  Workstation performed: [id number]       Labs:   Lab Results   Component Value Date    WBC 9 46 10/07/2018    HGB 11 4 (L) 10/07/2018    HCT 35 7 10/07/2018    MCV 92 10/07/2018     10/07/2018     Lab Results   Component Value Date     10/08/2018    K 3 9 10/08/2018     (H) 10/08/2018    CO2 22 10/08/2018    ANIONGAP 9 12/17/2015    BUN 22 10/08/2018    CREATININE 2 25 (H) 10/08/2018    GLUCOSE 138 10/04/2016    GLUF 85 01/23/2018    CALCIUM 7 9 (L) 10/08/2018    AST 22 10/06/2018    ALT 13 10/06/2018    ALKPHOS 71 10/06/2018    PROT 6 2 (L) 11/06/2015    BILITOT 0 49 11/06/2015    EGFR 21 10/08/2018       Lab Results   Component Value Date    IRON 21 (L) 01/29/2016    TIBC 204 (L) 01/29/2016    FERRITIN 349 01/29/2016       No results found for: [id number]      ROS:   Review of Systems   Constitutional: Negative for activity change, appetite change, diaphoresis, fatigue, fever and unexpected weight change  HENT: Negative for facial swelling, hearing loss, rhinorrhea, sinus pain, sinus pressure, sneezing, sore throat and tinnitus  Eyes: Negative for photophobia, pain, discharge, redness, itching and visual disturbance  Respiratory: Negative for apnea and chest tightness  Cardiovascular: Negative for chest pain, palpitations and leg swelling  Gastrointestinal: Negative for abdominal distention, abdominal pain, blood in stool, constipation, diarrhea, nausea, rectal pain and vomiting  Endocrine: Negative for cold intolerance, heat intolerance, polydipsia and polyphagia  Genitourinary: Negative for difficulty urinating, dyspareunia, frequency, hematuria, pelvic pain and urgency  Musculoskeletal: Negative for arthralgias, back pain, gait problem, joint swelling and myalgias  Skin: Negative for color change, pallor and rash  Allergic/Immunologic: Negative for environmental allergies and food allergies  Neurological: Negative for dizziness, tremors, seizures, syncope, speech difficulty, numbness and headaches  Hematological: Negative for adenopathy  Does not bruise/bleed easily  Psychiatric/Behavioral: Negative for agitation, confusion, dysphoric mood, hallucinations and suicidal ideas  Current Medications: Reviewed  Allergies: Reviewed  PMH/FH/SH:  Reviewed      Physical Exam:    Body surface area is 1 81 meters squared      Wt Readings from Last 3 Encounters:   10/23/18 84 4 kg (186 lb)   10/06/18 87 1 kg (192 lb)   10/05/18 87 8 kg (193 lb 9 6 oz)        Temp Readings from Last 3 Encounters:   10/23/18 97 6 °F (36 4 °C) (Tympanic)   10/08/18 98 6 °F (37 °C)   04/24/18 98 3 °F (36 8 °C)        BP Readings from Last 3 Encounters:   10/23/18 142/78   10/08/18 149/100   06/20/18 130/80         Pulse Readings from Last 3 Encounters:   10/23/18 60   10/07/18 70   06/20/18 78        Physical Exam   Constitutional: She is oriented to person, place, and time  She appears well-developed and well-nourished  No distress  HENT:   Head: Normocephalic and atraumatic  Mouth/Throat: Oropharynx is clear and moist  No oropharyngeal exudate  Eyes: Pupils are equal, round, and reactive to light  Conjunctivae and EOM are normal    Neck: Normal range of motion  Neck supple  No JVD present  No tracheal deviation present  No thyromegaly present  Cardiovascular: Normal rate and regular rhythm  Exam reveals no gallop and no friction rub  No murmur heard  Pulmonary/Chest: Effort normal and breath sounds normal  No respiratory distress  She has no wheezes  She has no rales  She exhibits no tenderness  Abdominal: Soft  Bowel sounds are normal  She exhibits no distension and no mass  There is no tenderness  There is no rebound and no guarding  Ileostomy bag   Musculoskeletal: Normal range of motion  Lymphadenopathy:     She has no cervical adenopathy  Neurological: She is alert and oriented to person, place, and time  Skin: Skin is warm and dry  No rash noted  She is not diaphoretic  No erythema  No pallor  Psychiatric: She has a normal mood and affect  Her behavior is normal  Judgment and thought content normal    Vitals reviewed  Goals and Barriers:  Current Goal: Minimize effects of disease  Barriers: None  Patient's Capacity to Self Care:  Patient is able to self care      Code Status: [unfilled]

## 2018-11-02 ENCOUNTER — TELEPHONE (OUTPATIENT)
Dept: HEMATOLOGY ONCOLOGY | Facility: CLINIC | Age: 73
End: 2018-11-02

## 2018-11-02 NOTE — TELEPHONE ENCOUNTER
Richelle Arambula called wanting to inform Dr Vaibhav Shelley that her co-pay for her Eliquis is a little over $10  She stated that she is able to afford that cost  He wanted her to call in and let him know

## 2019-01-09 ENCOUNTER — TELEPHONE (OUTPATIENT)
Dept: NEPHROLOGY | Facility: CLINIC | Age: 74
End: 2019-01-09

## 2019-01-09 NOTE — TELEPHONE ENCOUNTER
Lurdes Munguia called in c/o diarrhea for the 2nd week in a row  She stated that she is unable to eat anything without having to run to the bathroom  She attempted to call her GI doctor however Dr Ann Kawasaki is out of the office and was not able to speak with another physician regarding the issue  She wanted to know if Dr Gayle Patterson would be able to address the concern or potentially prescribe anything  I verbally spoke with Dr Gayle Patterson and per our verbal conversation, Dr Gayle Patterson recommends to follow up with PCP in addition to her Hematologist  I called Lurdes Munguia back and explained Dr Queen Rios recommendation  She understands the plan and will call her PCP  No other concerns at the moment

## 2019-01-11 ENCOUNTER — APPOINTMENT (EMERGENCY)
Dept: RADIOLOGY | Facility: HOSPITAL | Age: 74
DRG: 083 | End: 2019-01-11
Payer: COMMERCIAL

## 2019-01-11 ENCOUNTER — HOSPITAL ENCOUNTER (INPATIENT)
Facility: HOSPITAL | Age: 74
LOS: 3 days | Discharge: HOME | DRG: 083 | End: 2019-01-14
Attending: EMERGENCY MEDICINE | Admitting: SURGERY
Payer: COMMERCIAL

## 2019-01-11 DIAGNOSIS — A04.71 RECURRENT CLOSTRIDIUM DIFFICILE DIARRHEA: ICD-10-CM

## 2019-01-11 DIAGNOSIS — S06.5X9A SUBDURAL HEMATOMA, ACUTE (HCC): ICD-10-CM

## 2019-01-11 DIAGNOSIS — I61.5 INTRAVENTRICULAR HEMORRHAGE (HCC): ICD-10-CM

## 2019-01-11 DIAGNOSIS — R55 SYNCOPE: ICD-10-CM

## 2019-01-11 DIAGNOSIS — S06.5X9A SDH (SUBDURAL HEMATOMA) (HCC): Primary | ICD-10-CM

## 2019-01-11 DIAGNOSIS — S06.5X9A SUBDURAL HEMATOMA (HCC): ICD-10-CM

## 2019-01-11 DIAGNOSIS — N17.9 AKI (ACUTE KIDNEY INJURY) (HCC): ICD-10-CM

## 2019-01-11 DIAGNOSIS — R19.7 DIARRHEA: ICD-10-CM

## 2019-01-11 PROBLEM — E87.6 HYPOKALEMIA: Status: ACTIVE | Noted: 2019-01-11
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
## 2019-01-11 PROBLEM — S06.5XAA SUBDURAL HEMATOMA, ACUTE: Status: ACTIVE | Noted: 2019-01-11

## 2019-01-11 LAB
ALBUMIN SERPL BCP-MCNC: 3.1 G/DL (ref 3.5–5)
ALP SERPL-CCNC: 94 U/L (ref 46–116)
ALT SERPL W P-5'-P-CCNC: 11 U/L (ref 12–78)
ANION GAP SERPL CALCULATED.3IONS-SCNC: 12 MMOL/L (ref 4–13)
AST SERPL W P-5'-P-CCNC: 20 U/L (ref 5–45)
ATRIAL RATE: 72 BPM
BACTERIA UR QL AUTO: ABNORMAL /HPF
BASOPHILS # BLD MANUAL: 0.05 THOUSAND/UL (ref 0–0.1)
BASOPHILS NFR MAR MANUAL: 1 % (ref 0–1)
BILIRUB SERPL-MCNC: 0.4 MG/DL (ref 0.2–1)
BILIRUB UR QL STRIP: ABNORMAL
BUN SERPL-MCNC: 36 MG/DL (ref 5–25)
CALCIUM SERPL-MCNC: 8.7 MG/DL (ref 8.3–10.1)
CHLORIDE SERPL-SCNC: 108 MMOL/L (ref 100–108)
CLARITY UR: ABNORMAL
CO2 SERPL-SCNC: 19 MMOL/L (ref 21–32)
COLOR UR: ABNORMAL
COLOR, POC: YELLOW
CREAT SERPL-MCNC: 3.08 MG/DL (ref 0.6–1.3)
EOSINOPHIL # BLD MANUAL: 0.11 THOUSAND/UL (ref 0–0.4)
EOSINOPHIL NFR BLD MANUAL: 2 % (ref 0–6)
ERYTHROCYTE [DISTWIDTH] IN BLOOD BY AUTOMATED COUNT: 13.9 % (ref 11.6–15.1)
GFR SERPL CREATININE-BSD FRML MDRD: 14 ML/MIN/1.73SQ M
GIANT PLATELETS BLD QL SMEAR: PRESENT
GLUCOSE SERPL-MCNC: 124 MG/DL (ref 65–140)
GLUCOSE UR STRIP-MCNC: NEGATIVE MG/DL
HCT VFR BLD AUTO: 33.5 % (ref 34.8–46.1)
HGB BLD-MCNC: 11.1 G/DL (ref 11.5–15.4)
HGB UR QL STRIP.AUTO: ABNORMAL
KETONES UR STRIP-MCNC: NEGATIVE MG/DL
LEUKOCYTE ESTERASE UR QL STRIP: ABNORMAL
LIPASE SERPL-CCNC: 314 U/L (ref 73–393)
LYMPHOCYTES # BLD AUTO: 0.33 THOUSAND/UL (ref 0.6–4.47)
LYMPHOCYTES # BLD AUTO: 6 % (ref 14–44)
MAGNESIUM SERPL-MCNC: 1.8 MG/DL (ref 1.6–2.6)
MCH RBC QN AUTO: 29.5 PG (ref 26.8–34.3)
MCHC RBC AUTO-ENTMCNC: 33.1 G/DL (ref 31.4–37.4)
MCV RBC AUTO: 89 FL (ref 82–98)
MONOCYTES # BLD AUTO: 0.22 THOUSAND/UL (ref 0–1.22)
MONOCYTES NFR BLD: 4 % (ref 4–12)
MYELOCYTES NFR BLD MANUAL: 2 % (ref 0–1)
NEUTROPHILS # BLD MANUAL: 4.13 THOUSAND/UL (ref 1.85–7.62)
NEUTS SEG NFR BLD AUTO: 76 % (ref 43–75)
NITRITE UR QL STRIP: NEGATIVE
NON-SQ EPI CELLS URNS QL MICRO: ABNORMAL /HPF
NRBC BLD AUTO-RTO: 0 /100 WBCS
P AXIS: 81 DEGREES
PH UR STRIP.AUTO: 7 [PH] (ref 4.5–8)
PHOSPHATE SERPL-MCNC: 2.7 MG/DL (ref 2.3–4.1)
PLATELET # BLD AUTO: 341 THOUSANDS/UL (ref 149–390)
PLATELET BLD QL SMEAR: ADEQUATE
PMV BLD AUTO: 10.1 FL (ref 8.9–12.7)
POIKILOCYTOSIS BLD QL SMEAR: PRESENT
POTASSIUM SERPL-SCNC: 3 MMOL/L (ref 3.5–5.3)
PR INTERVAL: 160 MS
PROT SERPL-MCNC: 6.8 G/DL (ref 6.4–8.2)
PROT UR STRIP-MCNC: ABNORMAL MG/DL
QRS AXIS: 18 DEGREES
QRSD INTERVAL: 88 MS
QT INTERVAL: 404 MS
QTC INTERVAL: 442 MS
RBC # BLD AUTO: 3.76 MILLION/UL (ref 3.81–5.12)
RBC #/AREA URNS AUTO: ABNORMAL /HPF
RBC MORPH BLD: PRESENT
RV AG STL QL: NEGATIVE
SODIUM SERPL-SCNC: 139 MMOL/L (ref 136–145)
SP GR UR STRIP.AUTO: 1.02 (ref 1–1.03)
T WAVE AXIS: 19 DEGREES
TROPONIN I SERPL-MCNC: <0.02 NG/ML
UROBILINOGEN UR QL STRIP.AUTO: 0.2 E.U./DL
VARIANT LYMPHS # BLD AUTO: 9 %
VENTRICULAR RATE: 72 BPM
WBC # BLD AUTO: 5.43 THOUSAND/UL (ref 4.31–10.16)
WBC #/AREA URNS AUTO: ABNORMAL /HPF

## 2019-01-11 PROCEDURE — 83735 ASSAY OF MAGNESIUM: CPT | Performed by: EMERGENCY MEDICINE

## 2019-01-11 PROCEDURE — 73502 X-RAY EXAM HIP UNI 2-3 VIEWS: CPT

## 2019-01-11 PROCEDURE — 87086 URINE CULTURE/COLONY COUNT: CPT

## 2019-01-11 PROCEDURE — 99223 1ST HOSP IP/OBS HIGH 75: CPT | Performed by: SURGERY

## 2019-01-11 PROCEDURE — 85027 COMPLETE CBC AUTOMATED: CPT | Performed by: EMERGENCY MEDICINE

## 2019-01-11 PROCEDURE — 84484 ASSAY OF TROPONIN QUANT: CPT | Performed by: EMERGENCY MEDICINE

## 2019-01-11 PROCEDURE — 87493 C DIFF AMPLIFIED PROBE: CPT | Performed by: EMERGENCY MEDICINE

## 2019-01-11 PROCEDURE — 83690 ASSAY OF LIPASE: CPT | Performed by: EMERGENCY MEDICINE

## 2019-01-11 PROCEDURE — 87425 ROTAVIRUS AG IA: CPT | Performed by: EMERGENCY MEDICINE

## 2019-01-11 PROCEDURE — 90715 TDAP VACCINE 7 YRS/> IM: CPT | Performed by: EMERGENCY MEDICINE

## 2019-01-11 PROCEDURE — 99285 EMERGENCY DEPT VISIT HI MDM: CPT

## 2019-01-11 PROCEDURE — 74176 CT ABD & PELVIS W/O CONTRAST: CPT

## 2019-01-11 PROCEDURE — 90471 IMMUNIZATION ADMIN: CPT

## 2019-01-11 PROCEDURE — 85007 BL SMEAR W/DIFF WBC COUNT: CPT | Performed by: EMERGENCY MEDICINE

## 2019-01-11 PROCEDURE — 93010 ELECTROCARDIOGRAM REPORT: CPT | Performed by: INTERNAL MEDICINE

## 2019-01-11 PROCEDURE — 70450 CT HEAD/BRAIN W/O DYE: CPT

## 2019-01-11 PROCEDURE — 93005 ELECTROCARDIOGRAM TRACING: CPT

## 2019-01-11 PROCEDURE — 80053 COMPREHEN METABOLIC PANEL: CPT | Performed by: EMERGENCY MEDICINE

## 2019-01-11 PROCEDURE — 96360 HYDRATION IV INFUSION INIT: CPT

## 2019-01-11 PROCEDURE — 81001 URINALYSIS AUTO W/SCOPE: CPT

## 2019-01-11 PROCEDURE — 99223 1ST HOSP IP/OBS HIGH 75: CPT | Performed by: NEUROLOGICAL SURGERY

## 2019-01-11 PROCEDURE — 96361 HYDRATE IV INFUSION ADD-ON: CPT

## 2019-01-11 PROCEDURE — 36415 COLL VENOUS BLD VENIPUNCTURE: CPT | Performed by: EMERGENCY MEDICINE

## 2019-01-11 PROCEDURE — 84100 ASSAY OF PHOSPHORUS: CPT | Performed by: EMERGENCY MEDICINE

## 2019-01-11 RX ORDER — ACETAMINOPHEN 325 MG/1
975 TABLET ORAL EVERY 8 HOURS SCHEDULED
Status: DISCONTINUED | OUTPATIENT
Start: 2019-01-11 | End: 2019-01-14 | Stop reason: HOSPADM

## 2019-01-11 RX ORDER — SODIUM CHLORIDE, SODIUM GLUCONATE, SODIUM ACETATE, POTASSIUM CHLORIDE, MAGNESIUM CHLORIDE, SODIUM PHOSPHATE, DIBASIC, AND POTASSIUM PHOSPHATE .53; .5; .37; .037; .03; .012; .00082 G/100ML; G/100ML; G/100ML; G/100ML; G/100ML; G/100ML; G/100ML
125 INJECTION, SOLUTION INTRAVENOUS CONTINUOUS
Status: DISCONTINUED | OUTPATIENT
Start: 2019-01-11 | End: 2019-01-12

## 2019-01-11 RX ORDER — CALCITRIOL 0.25 UG/1
0.25 CAPSULE, LIQUID FILLED ORAL 3 TIMES WEEKLY
Status: DISCONTINUED | OUTPATIENT
Start: 2019-01-11 | End: 2019-01-14 | Stop reason: HOSPADM

## 2019-01-11 RX ORDER — LEVETIRACETAM 500 MG/1
500 TABLET ORAL EVERY 12 HOURS SCHEDULED
Status: DISCONTINUED | OUTPATIENT
Start: 2019-01-11 | End: 2019-01-11

## 2019-01-11 RX ORDER — SODIUM BICARBONATE 650 MG/1
650 TABLET ORAL
Status: DISCONTINUED | OUTPATIENT
Start: 2019-01-11 | End: 2019-01-14 | Stop reason: HOSPADM

## 2019-01-11 RX ORDER — CITALOPRAM 20 MG/1
20 TABLET ORAL DAILY
Status: DISCONTINUED | OUTPATIENT
Start: 2019-01-11 | End: 2019-01-14 | Stop reason: HOSPADM

## 2019-01-11 RX ORDER — LEVETIRACETAM 500 MG/1
500 TABLET ORAL EVERY 12 HOURS SCHEDULED
Status: DISCONTINUED | OUTPATIENT
Start: 2019-01-11 | End: 2019-01-14 | Stop reason: HOSPADM

## 2019-01-11 RX ORDER — MELATONIN
1000 DAILY
Status: DISCONTINUED | OUTPATIENT
Start: 2019-01-11 | End: 2019-01-14 | Stop reason: HOSPADM

## 2019-01-11 RX ORDER — POTASSIUM CHLORIDE 14.9 MG/ML
20 INJECTION INTRAVENOUS ONCE
Status: DISCONTINUED | OUTPATIENT
Start: 2019-01-11 | End: 2019-01-11

## 2019-01-11 RX ORDER — LEVOTHYROXINE SODIUM 0.07 MG/1
75 TABLET ORAL
Status: DISCONTINUED | OUTPATIENT
Start: 2019-01-12 | End: 2019-01-14 | Stop reason: HOSPADM

## 2019-01-11 RX ORDER — POTASSIUM CHLORIDE 20 MEQ/1
40 TABLET, EXTENDED RELEASE ORAL ONCE
Status: COMPLETED | OUTPATIENT
Start: 2019-01-11 | End: 2019-01-11

## 2019-01-11 RX ORDER — SACCHAROMYCES BOULARDII 250 MG
250 CAPSULE ORAL 2 TIMES DAILY
Status: DISCONTINUED | OUTPATIENT
Start: 2019-01-11 | End: 2019-01-12

## 2019-01-11 RX ORDER — POTASSIUM CHLORIDE 14.9 MG/ML
20 INJECTION INTRAVENOUS ONCE
Status: COMPLETED | OUTPATIENT
Start: 2019-01-11 | End: 2019-01-11

## 2019-01-11 RX ORDER — LANOLIN ALCOHOL/MO/W.PET/CERES
6 CREAM (GRAM) TOPICAL
Status: DISCONTINUED | OUTPATIENT
Start: 2019-01-11 | End: 2019-01-14 | Stop reason: HOSPADM

## 2019-01-11 RX ORDER — ONDANSETRON 2 MG/ML
4 INJECTION INTRAMUSCULAR; INTRAVENOUS EVERY 8 HOURS PRN
Status: DISCONTINUED | OUTPATIENT
Start: 2019-01-11 | End: 2019-01-14 | Stop reason: HOSPADM

## 2019-01-11 RX ADMIN — POTASSIUM CHLORIDE 40 MEQ: 1500 TABLET, EXTENDED RELEASE ORAL at 15:02

## 2019-01-11 RX ADMIN — POTASSIUM CHLORIDE 20 MEQ: 200 INJECTION, SOLUTION INTRAVENOUS at 15:02

## 2019-01-11 RX ADMIN — CALCITRIOL CAPSULES 0.25 MCG 0.25 MCG: 0.25 CAPSULE ORAL at 16:30

## 2019-01-11 RX ADMIN — TETANUS TOXOID, REDUCED DIPHTHERIA TOXOID AND ACELLULAR PERTUSSIS VACCINE, ADSORBED 0.5 ML: 5; 2.5; 8; 8; 2.5 SUSPENSION INTRAMUSCULAR at 15:09

## 2019-01-11 RX ADMIN — MELATONIN 6 MG: at 22:51

## 2019-01-11 RX ADMIN — ACETAMINOPHEN 975 MG: 325 TABLET, FILM COATED ORAL at 15:10

## 2019-01-11 RX ADMIN — SODIUM BICARBONATE 650 MG TABLET 650 MG: at 17:56

## 2019-01-11 RX ADMIN — ACETAMINOPHEN 975 MG: 325 TABLET, FILM COATED ORAL at 22:51

## 2019-01-11 RX ADMIN — LEVETIRACETAM 500 MG: 500 TABLET, FILM COATED ORAL at 20:13

## 2019-01-11 RX ADMIN — SODIUM CHLORIDE 1000 ML: 0.9 INJECTION, SOLUTION INTRAVENOUS at 13:22

## 2019-01-11 RX ADMIN — SODIUM CHLORIDE, SODIUM GLUCONATE, SODIUM ACETATE, POTASSIUM CHLORIDE, MAGNESIUM CHLORIDE, SODIUM PHOSPHATE, DIBASIC, AND POTASSIUM PHOSPHATE 125 ML/HR: .53; .5; .37; .037; .03; .012; .00082 INJECTION, SOLUTION INTRAVENOUS at 15:15

## 2019-01-11 NOTE — ED ATTENDING ATTESTATION
Rosy Gupta MD, saw and evaluated the patient  I have discussed the patient with the resident/non-physician practitioner and agree with the resident's/non-physician practitioner's findings, Plan of Care, and MDM as documented in the resident's/non-physician practitioner's note, except where noted  All available labs and Radiology studies were reviewed  At this point I agree with the current assessment done in the Emergency Department    I have conducted an independent evaluation of this patient a history and physical is as follows:    ckd  Irritable bowel   Loose stools and diarrhea with no blood    Some abd cramping   Has had sbo in the past   The cramps got worse  No vomiting     Had a fall  And bumped head positive syncope on Sunday  lightheaded and spinning at the time  Patient awoke on the floor she noted dry blood in the back of her head she has no complaints of headache   Has a iliouinary conduit   2nd to bladder removal   No travel  No recent abx    Exam nad   Anicteric  Mmm   Has dry blood on occiput   Neck nontender lungs clear heart regular abdomen soft nontender positive bowel sounds neurologic exam cranial nerves intact motor 5/5 sensory grossly intact  CT of head labs IV fluids  EKG and cardiac workup    Critical Care Time  CritCare Time    Procedures

## 2019-01-11 NOTE — RESPIRATORY THERAPY NOTE
RT Protocol Note  Jhon Meza 68 y o  female MRN: [id number]  Unit/Bed#: ED 20 Encounter: [id number]    Assessment    Active Problems:    Fall      Home Pulmonary Medications:  none       Past Medical History:   Diagnosis Date    Anxiety     Chronic kidney disease (CKD), stage IV (severe) (HCC)     stage IV    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    Hydronephrosis     Hypertension     Hypothyroid     Incontinence     Lung mass     Improving on PET/CT 1/2016    [facility]a [facility] [address]     Pulmonary embolism ([facility] [address] ) 2014    Urinary tract infection      Social History     Social History    Marital status:      Spouse name: N/A    Number of children: N/A    Years of education: N/A     Social History Main Topics    Smoking status: Never Smoker    Smokeless tobacco: Never Used    Alcohol use No    Drug use: No    Sexual activity: No     Other Topics Concern    None     Social History Narrative    None       Subjective         Objective    Physical Exam:   Assessment Type: (P) Assess only  General Appearance: (P) Alert  Respiratory Pattern: (P) Normal  Chest Assessment: (P) Chest expansion symmetrical  Bilateral Breath Sounds: (P) Clear  Cough: (P) Strong, Dry, Non-productive  O2 Device: (P) ra    Vitals:  Blood pressure 139/74, pulse 78, temperature 97 8 °F (36 6 °C), temperature source Oral, resp  rate 18, height 5' (1 524 m), weight 84 4 kg (186 lb), SpO2 (P) 100 %  Imaging and other studies: I have personally reviewed pertinent reports  O2 Device: (P) ra     Plan    Respiratory Plan: (P) Discontinue Protocol  Airway Clearance Plan: (P) Discontinue Protocol, Incentive Spirometer     Resp Comments: (P) Pt assessed per protocol  No distress, never a smoker, no pulmonary meds, no wheeze  Pt achieved 1500 mL on IS which is > 10 ml/kg ibw   Plan to DC from protocol

## 2019-01-11 NOTE — H&P
H&P Exam - Trauma   Joe Benjamin 68 y o  female MRN: [id number]  Unit/Bed#: ED 20 Encounter: [id number]    Assessment/Plan   Trauma Alert: Evaluation  Model of Arrival: Self  Trauma Team: Attending Dr Maricarmen Cross and FRANCES Panchal  Consultants: Neurosurgery: SDH      Trauma Active Problems:   SDH  Diarrhea  Chronic saddle PE, Hx DVTs   Polycythemia Vera   Eliquis anticoagulopathy  LUANN on CKD  Ileal conduit   syncope    Trauma Plan:   Admit to ICU for SDH on noval agent  Will hold off on reversal at this time given fall was 5 days ago and given history of chronic saddle PE  Q1 hour neuro checks - GCS 15, no neuro deficits now  C diff and stool labs ordered   Rehydrate with isolate   Follow-up AM BMP     Chief Complaint: diarrhea     History of Present Illness   HPI:  Joe Benjamin is a 68 y o  female who presents with SDH after a fall on Sunday  She reports she felt dizzy then appears to have syncopized, waking up on the floor presumably striking her head on the coffee table  She elicits a small amount of bleeding from her head but otherwise felt ok  She reports leading up to this fall she has had diarrhea for the past 2 weeks  She denies any abdominal pain or cramping and denies any fevers  She denies any sick contacts and reports no one in her family has become ill, just herself  She reports her stools are loose and "all water it seems like " She reports she has had C  Diff in the past but states this is different than those experiences  She was instructed to try imodium by her PCP which helped slow down the frequency of diarrhea but gave her increased gas and bloating pains  She presented to the ER today for work-up of her diarrhea at which point her scalp abrasion was noted and a head CT was ordered given that she remains on eliquis  She denies any symptoms related to the SDH such as headache, dizziness, weakness, gait changes or cognitive changes   She denies any nausea or vomiting but admits she is likely dehydrated as she has not been eating and drinking much because " it all just goes right through me " She denies ever passing out before and has not had any further dizzy, presyncope or syncope symptoms since Sunday  Her past medical history is significant for chronic saddle PE, history of multiple DVT's and PEs, polycythemia Vera for which she remains on eliquis  She also has a history of ileal conduit for "i couldn't control my bladder anymore", SBO in OCt, C  Diff in the past, HTN, hypothyroidism, and CKD with baseline creat in the high 2's  She denies any MI, cardiac stents, arrhythmias, or diabetes  Mechanism:Fall    Review of Systems   Constitutional: Positive for appetite change (x 2 weeks decreased ) and fatigue  Negative for activity change, chills, diaphoresis and fever  HENT: Negative for congestion, facial swelling, trouble swallowing and voice change  Eyes: Negative for photophobia and visual disturbance  Respiratory: Negative for cough, choking, chest tightness, shortness of breath, wheezing and stridor  Cardiovascular: Negative for chest pain, palpitations and leg swelling  Gastrointestinal: Negative for abdominal distention, abdominal pain, nausea and vomiting  Genitourinary: Negative for flank pain  Nephrostomy tube with clear nikos urine   Musculoskeletal: Negative for arthralgias, back pain, gait problem, joint swelling, myalgias, neck pain and neck stiffness  Skin: Positive for wound (right sided scalp abrasion)  Neurological: Positive for dizziness (intermittent) and syncope (on sunday)  Negative for tremors, seizures, facial asymmetry, speech difficulty, weakness, light-headedness, numbness and headaches  Hematological: Bruises/bleeds easily (on eliquis)  Psychiatric/Behavioral: Negative for agitation, behavioral problems and confusion         Historical Information       Past Medical History:   Diagnosis Date    Anxiety     Chronic kidney disease (CKD), stage IV (severe) (HCC)     stage IV    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    Hydronephrosis     Hypertension     Hypothyroid     Incontinence     Lung mass     Improving on PET/CT 1/2016    [facility]a [facility] [address] [facility]     Pulmonary embolism ([facility] [address] ) 2014    Urinary tract infection      Past Surgical History:   Procedure Laterality Date    BLADDER SUSPENSION      BOTOX INJECTION N/A 7/27/2016    Procedure: BOTOX INJECTION ;  Surgeon: Kristin Macedo MD;  [facility];  Service:     CHOLECYSTECTOMY N/A     COLONOSCOPY      CYSTECTOMY, RADICAL WITH ILEOCONDUIT N/A 10/4/2016    Procedure: SUPRATRIGONAL CYSTECTOMY WITH ILEAL CONDUIT ;  Surgeon: Kristin Macedo MD;  [facility];  Service:    Precilla Aide W/ RETROGRADES Bilateral 7/27/2016    Procedure: Alcario Quale ;  Surgeon: Kristin Macedo MD;  [facility];  Service:     TN COLONOSCOPY FLX DX W/COLLJ SPEC WHEN PFRMD N/A 8/31/2016    Procedure: COLONOSCOPY;  Surgeon: Christen Gracia MD;  Location: [facility];   Service: Gastroenterology    TN CYSTOSCOPY,INSERT URETERAL STENT Bilateral 7/27/2016    Procedure: STENT INSERTION; EXCISION OF MESH ;  Surgeon: Kristin Macedo MD;  [facility];  Service: Urology    TONSILLECTOMY      TUBAL LIGATION      WISDOM TOOTH EXTRACTION       Social History   History   Alcohol Use No     History   Drug Use No     History   Smoking Status    Never Smoker   Smokeless Tobacco    Never Used     Immunization History   Administered Date(s) Administered    Influenza, Quadrivalent (nasal) 11/03/2016     Last Tetanus: up to date per patient  Family History: Non-contributory  Unable to obtain/limited by       Meds/Allergies   all current active meds have been reviewed    No Known Allergies      PHYSICAL EXAM    PE limited by:     Objective   Vitals:   First set: Temperature: 97 8 °F (36 6 °C) (01/11/19 1124)  Pulse: 98 (01/11/19 1124)  Respirations: 18 (01/11/19 1124)  Blood Pressure: 121/72 (01/11/19 1124)    Primary Survey:   (A) Airway: intact  (B) Breathing: equal bilateral  (C) Circulation: Pulses:   normal  (D) Disabliity:  GCS Total:  15  (E) Expose:  Completed    Secondary Survey: (Click on Physical Exam tab above)  Physical Exam   Constitutional: She is oriented to person, place, and time  She appears well-developed  No distress  HENT:   Head: Normocephalic  Right Ear: External ear normal    Left Ear: External ear normal    Right scalp abrasion, no hematoma or active bleeding      Eyes: Pupils are equal, round, and reactive to light  Conjunctivae and EOM are normal  Right eye exhibits no discharge  Left eye exhibits no discharge  Neck: Normal range of motion  No tracheal deviation present  Cardiovascular: Normal rate, regular rhythm, normal heart sounds and intact distal pulses  Pulmonary/Chest: Effort normal and breath sounds normal  No stridor  No respiratory distress  She has no wheezes  She has no rales  She exhibits no tenderness  Abdominal: Soft  Bowel sounds are normal  She exhibits no distension and no mass  There is no tenderness  There is no rebound and no guarding  Ileal conduit with nikos yellow urine, clear   Musculoskeletal: Normal range of motion  She exhibits no edema, tenderness or deformity  Bruise over left shoulder, small no hematoma or tenderness, no ROM limited  Right shin bruise, no tenderness or limited ROM    Neurological: She is alert and oriented to person, place, and time  No cranial nerve deficit  Skin: Skin is warm and dry  She is not diaphoretic  Psychiatric: She has a normal mood and affect  Her behavior is normal        Invasive Devices     Peripheral Intravenous Line            Peripheral IV 01/11/19 Right Antecubital less than 1 day          Drain            Urostomy Ileal conduit  days                Lab Results:   Results: I have personally reviewed pertinent reports     and I have personally reviewed pertinent films in PACS, BMP/CMP:   Lab Results   Component Value Date    SODIUM 139 01/11/2019    K 3 0 (L) 01/11/2019     01/11/2019    CO2 19 (L) 01/11/2019    BUN 36 (H) 01/11/2019    CREATININE 3 08 (H) 01/11/2019    CALCIUM 8 7 01/11/2019    AST 20 01/11/2019    ALT 11 (L) 01/11/2019    ALKPHOS 94 01/11/2019    EGFR 14 01/11/2019   , CBC:   Lab Results   Component Value Date    WBC 5 43 01/11/2019    HGB 11 1 (L) 01/11/2019    HCT 33 5 (L) 01/11/2019    MCV 89 01/11/2019     01/11/2019    MCH 29 5 01/11/2019    MCHC 33 1 01/11/2019    RDW 13 9 01/11/2019    MPV 10 1 01/11/2019    NRBC 0 01/11/2019    and Coagulation: No results found for: PT, INR, APTT  Imaging/EKG Studies: Results: I have personally reviewed pertinent reports     and I have personally reviewed pertinent films in PACS  Other Studies:     Code Status: Prior  Advance Directive and Living Will:      Power of :    POLST:

## 2019-01-11 NOTE — PROGRESS NOTES
Transfer/Accept - [facility] 68 y o  female MRN: [id number]  Unit/Bed#: ED 20 Encounter: [id number]    Reason for Transfer / Chief Complaint: Intracranial hemorrhage    History of Present Illness:  Leatha Tlilman is a 68 y o  female who presents on 1/11 with two weeks of worsening diarrhea and a mechanical fall on 1/6,striking her head on a coffee table  She has a past medical history of supratrigonal cystectomy and ileal conduit, CKD stage 4 with a baseline creatinine of mid to high 2's, polycythemia vera on Eliquis with chronic DVT and saddle pulmonary embolism, hypertension, hypothyroidism and secondary hyperparathyroidism  She reports sudden onset of diarrhea 2 weeks ago with worsening on Sunday at which point she became light-headed and fell  She reports no neurologic sequela from the fall  She reports calling her primary care after her diarrhea worsened despite immodium  He instructed her to present to the emergency room  On presentation, she underwent a head CT which demonstrated a small subdural with intraventricular hemorrhage and with her ongoing Eliquis use, she was referred to the critical care unit for admission  History obtained from the patient      Past Medical History:  Past Medical History:   Diagnosis Date    Anxiety     Chronic kidney disease (CKD), stage IV (severe) (HCC)     stage IV    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    Hydronephrosis     Hypertension     Hypothyroid     Incontinence     Lung mass     Improving on PET/CT 1/2016    [facility]cy[facility]a [facility] [address] [facility]     Pulmonary embolism ([facility] [address] ) 2014    Urinary tract infection        Past Surgical History:  Past Surgical History:   Procedure Laterality Date    BLADDER SUSPENSION      BOTOX INJECTION N/A 7/27/2016    Procedure: BOTOX INJECTION ;  Surgeon: Kristin Macedo MD;  [facility];  Service:     CHOLECYSTECTOMY N/A     COLONOSCOPY      CYSTECTOMY, RADICAL WITH ILEOCONDUIT N/A 10/4/2016    Procedure: SUPRATRIGONAL CYSTECTOMY WITH ILEAL CONDUIT ;  Surgeon: Aidan Kapoor MD;  [facility];  Service:    Drena Kit W/ RETROGRADES Bilateral 7/27/2016    Procedure: Juluis Door; RETROGRADE PYELOGRAM ;  Surgeon: Aidan Kapoor MD;  [facility];  Service:     [place] COLONOSCOPY FLX DX W/COLLJ [address] PFRMD N/A 8/31/2016    Procedure: COLONOSCOPY;  Surgeon: Lucrecia Canela MD;  [facility]: [facility]; Service: Gastroenterology    OH CYSTOSCOPY,INSERT URETERAL STENT Bilateral 7/27/2016    Procedure: STENT INSERTION; EXCISION OF MESH ;  Surgeon: Aidan Kapoor MD;  [facility];  Service: Urology    TONSILLECTOMY      TUBAL LIGATION      WISDOM TOOTH EXTRACTION         Past Family History:  Family History   Problem Relation Age of Onset    Cancer Mother         small cell cancer     Heart disease Father     COPD Father     Heart disease Brother     Nephrolithiasis Brother        Social History:  History   Smoking Status    Never Smoker   Smokeless Tobacco    Never Used     History   Alcohol Use No     History   Drug Use No     Marital Status:   Exercise History: Independent in ADLs    Home Medications:   Prior to Admission medications    Medication Sig Start Date End Date Taking? Authorizing Provider   amLODIPine (NORVASC) 10 mg tablet Take 10 mg by mouth daily   4/23/18   Historical Provider, MD   apixaban (ELIQUIS) 2 5 mg Take 1 tablet (2 5 mg total) by mouth 2 (two) times a day 10/23/18   Brian Gonsalez MD   calcitriol (ROCALTROL) 0 25 mcg capsule Take 1 capsule (0 25 mcg total) by mouth every other day for 30 days Take 1 tablet 3 times a week (Monday, Wednesday, Friday) 10/5/18 11/4/18  Tiff Bell MD   Cholecalciferol (VITAMIN D3) 1000 UNITS CAPS Take 1,000 unit marking on U-100 syringe by mouth daily      Historical Provider, MD   citalopram (CeleXA) 20 mg tablet  5/30/18   Historical Provider, MD DUBOIS 10 MG tablet TAKE 1 TABLET BY MOUTH ONE TIME DAILY  8/27/18   Nat Parks MD   levothyroxine 75 mcg tablet Take 75 mcg by mouth daily 2/11/18   Historical Provider, MD   metoprolol tartrate (LOPRESSOR) 25 mg tablet Take 0 5 tablets by mouth 2 (two) times a day 12/1/16   Historical Provider, MD   saccharomyces boulardii (FLORASTOR) 250 mg capsule Take 1 capsule by mouth daily      Historical Provider, MD   [address] [place] [address] [address] [employer] [facility] 8/17/18   Historical Provider, MD   sodium bicarbonate 650 mg tablet Take 1 tablet (650 mg total) by mouth 2 (two) times a day 7/9/18   Fauzia Tatum MD   [facility] 625 MG tablet TAKE 1 TABLET AT BEDTIME AT 8PM 12/28/17   Historical Provider, MD       Inpatient Medications:  Scheduled Meds:  Current Facility-Administered Medications:  acetaminophen 975 mg Oral Q8H [facility] VICTORINA Martinez    calcitriol 0 25 mcg Oral Once per day on Mon Wed Fri VICTORINA Martinez    cholecalciferol 1,000 Units Oral Daily VICTORINA Hoff    citalopram 20 mg Oral Daily VICTORINA Hoff    [START ON 1/12/2019] levothyroxine 75 mcg Oral Early Morning VICTORINA Martinez    melatonin 6 mg Oral HS VICTORINA Hoff    multi-electrolyte 125 mL/hr Intravenous Continuous VICTORINA Hoff Last Rate: 125 mL/hr (01/11/19 1515)   ondansetron 4 mg Intravenous Q8H PRN VICTORINA Martinez    potassium chloride 20 mEq Intravenous Once Derl Livers, DO Last Rate: 20 mEq (01/11/19 1502)   saccharomyces boulardii 250 mg Oral BID VICTORINA Martinez    sodium bicarbonate 650 mg Oral BID after meals VICTORINA Martinez      Continuous Infusions:  multi-electrolyte 125 mL/hr Last Rate: 125 mL/hr (01/11/19 1515)     PRN Meds:    ondansetron 4 mg Q8H PRN       Allergies:  No Known Allergies    ROS:   Review of Systems   Constitutional: Positive for activity change (diminished endurance) and fatigue  Negative for appetite change, chills, diaphoresis, fever and unexpected weight change     HENT: Negative  Eyes: Negative  Respiratory: Negative  Cardiovascular: Negative  Gastrointestinal: Positive for diarrhea  Negative for blood in stool, constipation, nausea and vomiting  Endocrine: Negative  Genitourinary: Negative  Musculoskeletal: Positive for gait problem (fall on )  Skin: Negative  Neurological: Positive for dizziness and weakness  Hematological: Negative  Psychiatric/Behavioral: Negative  Vitals:  Vitals:    19 1249 19 1330 19 1430 19 1519   BP: 142/78 151/95 158/75 139/74   BP Location: Left arm Left arm Left arm Right arm   Pulse: 73 70 68 78   Resp:    Temp:       TempSrc:       SpO2: 99% 97% 100% 100%   Weight:    84 4 kg (186 lb)   Height:    5' (1 524 m)     Temperature:   Temp (24hrs), Av 8 °F (36 6 °C), Min:97 8 °F (36 6 °C), Max:97 8 °F (36 6 °C)    Current Temperature: 97 8 °F (36 6 °C)    Weights:   IBW: 45 5 kg  Body mass index is 36 33 kg/m²  Hemodynamic Monitoring:  N/A     Non-Invasive/Invasive Ventilation Settings:  Respiratory    Lab Data (Last 4 hours)    None         O2/Vent Data (Last 4 hours)    None              No results found for: PHART, VUZ5KOX, PO2ART, ZXN3LRR, V4TCLCBI, BEART, SOURCE  SpO2: SpO2: 100 %, SpO2 Activity: SpO2 Activity: At Rest, SpO2 Device: O2 Device: None (Room air)     Physical Exam:  Physical Exam   Constitutional: She is oriented to person, place, and time  Vital signs are normal  She appears well-developed and well-nourished  She is cooperative  No distress  HENT:   Head: Normocephalic and atraumatic  Eyes: Pupils are equal, round, and reactive to light  Conjunctivae and EOM are normal    Neck: No JVD present  No tracheal deviation present  Cardiovascular: Normal rate, regular rhythm and intact distal pulses  Pulmonary/Chest: Effort normal and breath sounds normal  No respiratory distress  Abdominal: Soft  Bowel sounds are normal  She exhibits no distension   There is no tenderness  Musculoskeletal: Normal range of motion  She exhibits no edema, tenderness or deformity  Neurological: She is alert and oriented to person, place, and time  She has normal strength  No cranial nerve deficit or sensory deficit  GCS eye subscore is 4  GCS verbal subscore is 5  GCS motor subscore is 6  Skin: Skin is warm and dry  She is not diaphoretic  Psychiatric: She has a normal mood and affect  Labs:    Results from last 7 days  Lab Units 19  1242   WBC Thousand/uL 5 43   HEMOGLOBIN g/dL 11 1*   HEMATOCRIT % 33 5*   PLATELETS Thousands/uL 341   MONO PCT % 4       Results from last 7 days  Lab Units 19  1242   SODIUM mmol/L 139   POTASSIUM mmol/L 3 0*   CHLORIDE mmol/L 108   CO2 mmol/L 19*   ANION GAP mmol/L 12   BUN mg/dL 36*   CREATININE mg/dL 3 08*   CALCIUM mg/dL 8 7   ALT U/L 11*   AST U/L 20   ALK PHOS U/L 94   ALBUMIN g/dL 3 1*   TOTAL BILIRUBIN mg/dL 0 40       Results from last 7 days  Lab Units 19  1242   MAGNESIUM mg/dL 1 8   PHOSPHORUS mg/dL 2 7            Results from last 7 days  Lab Units 19  1242   TROPONIN I ng/mL <0 02         ABG:No results found for: PHART, TSV1YPX, PO2ART, OXW5KKR, I2HJCXYQ, BEART, SOURCE  VBG:          Imagin/11 CT A/P- No acute intra-abdominal abnormality, nonobstructing right renal calculi and marked left renal atrophy, 6 9 cm right adnexal cyst, grossly stable, stable appearance of post cystectomy and ileal conduit diversion   CT Head- Trace left temporal avute subdural hemorrhage, trace intraventricular hemorrhage in the dependent right occipital horn, small right posterior superior parietal scalp contusion   Xray pelvis- No acute osseous abnormality I have personally reviewed pertinent reports  and I have personally reviewed pertinent films in PACS  EKG: Review of telemetry demonstrates sinus rhythm This was personally reviewed by myself     Micro: ______________________________________________________________________    Assessment and Plan:   1  Acute subdural hematoma and intraventricular hemorrhage on Eliquis  2  Mechanical fall  3  Diarrhea  4  Acute on chronic kidney disease 4  5  Hypokalemia  6  Polycythemia vera  7  Chronic saddle pulmonary embolism  8  Hypertension  9  Hypothyroidism    Neuro: Close neurologic monitoring, continue home Celexa, follow neurosurgery recommendations    CV: Close hemodynamic monitoring, can likely resume home antihypertensive regimen    Lung: Encourage good pulmonary hygiene    GI: Would continue home Florastor, can add metamucil and follow results of stool cultures    F/E/N: Gentle IV hydration, replete electrolytes as needed, likely begin oral diet    : Close intake and output, daily weights, trend serum creatinine    ID: Follow temperature, white count, and culture results    Heme: Hold Eliquis at present, will follow with neurosurgery as to when to resume    Endo: Continue home levothyroxine    Msk/Skin: Frequent turning and repositioning, out of bed as tolerated, PT/OT    Disposition: ICU    Counseling / Coordination of Care  Total Critical Care time spent 31 minutes excluding procedures, teaching and family updates  ______________________________________________________________________    VTE Pharmacologic Prophylaxis: Pharmacologic VTE Prophylaxis contraindicated due to intracranial hemorrhage  VTE Mechanical Prophylaxis: sequential compression device    Invasive lines and devices: Invasive Devices     Peripheral Intravenous Line            Peripheral IV 01/11/19 Right Antecubital less than 1 day          Drain            Urostomy Ileal conduit  days                Code Status: Level 1 - Full Code  POA:    POLST:      Given critical illness, patient length of stay will require greater than two midnights  Portions of the record may have been created with voice recognition software    Occasional wrong word or "sound a like" substitutions may have occurred due to the inherent limitations of voice recognition software  Read the chart carefully and recognize, using context, where substitutions have occurred        VICTORINA Elias    Consults

## 2019-01-11 NOTE — CONSULTS
Consultation - Neurosurgery   Amos Fernandez 68 y o  female MRN: [id number]  Unit/Bed#: ED 20 Encounter: [id number]      Inpatient consult to Neurosurgery  Consult performed by: Slime Ley ordered by: Laura Resendiz          Assessment/Plan               Assessment:  1  Trace left temporal acute traumatic SDH  3mm  2  Trace IVH in the right occipital horn  3  History of hypertension  4  History of polycythemia vera  5  History of PE , on Eliquis  6  Acute diarrhea  7  History of CDF      Plan:   · Exam: GCS 15, A&OX3, PERRL, EOMI, 2mm, conjugate, SF, communicates well, finger-to-nose is normal and without drift bilaterally  Strength is 5/5 throughout  Sensation to light touch and pinprick is normal bilaterally,JPS normal bilaterally  DTR 2+ and without clonus and Babinski negative bilaterally  · Images:  CT head on 01/11/2029 Trace left temporal acute traumatic subdural hematoma, right occipital horn intraventricular hemorrhage  · Repeat CT head ordered for tomorrow morning    · Pain control:  Per primary team  · DVT ppx:   · SCDs  · No AC/AP/pharmacological DVT prophylaxis-gait repeat CT head without contrast  · Seizure ppx:  Keppra for 7 days  · Activity:  As tolerated  · Medical Mx:  Per primary team  · Neurocheck: Q 4h  · HOB>30-45 degree  · sBP<160  · NSx  Following closely by neuromonitoring and reviewing images  Repeat CT head ordered for tomorrow morning  If stable sign of and follow-up    HPI: Amos Fernandez is a 68y o  year old female with PMH of polycythemia vera, saddle type pulmonary emboli on Eliquis, history of status post inferior vena cava filter placement (later removed), DVT, hypertension, CDF came with acute diarrhea, on further questioning patient gave history of fall down at her home 6 days ago  She states she felt dizzy and fell down, lost consciousness for a brief period of time, denies history of seizure, headache, blurry vision or diplopia    Appetite is good denies any nausea or vomiting  Denies any numbness, weakness or paresthesia and extremities  Patient denies history of fever, chills, rigors, cough or chest pain  She uses urostomy bag, status post bladder removal (because history of incontinent to urine but patient is unsure because of incontinent  Patient denies history of congestive heart failure, diabetes mellitus, stroke, seizure, or bleeding disorder/is bruising  Patient denies history of smoking cigarettes or drinking alcohol  Head CT head without contrast demonstrates left temporal tiny 3 mm subdural hematoma and right occipital horn intraventricular hemorrhage  Review of Systems   Constitutional: Negative for activity change, chills and fever  HENT: Negative for trouble swallowing and voice change  Eyes: Negative for visual disturbance  Respiratory: Negative for apnea, shortness of breath, wheezing and stridor  Gastrointestinal: Positive for diarrhea  Negative for nausea and vomiting  Genitourinary:        Status post cystectomy,  Uses urostomy bag to void   Musculoskeletal: Negative for back pain, gait problem, myalgias, neck pain and neck stiffness  Skin: Negative for wound  Neurological: Positive for dizziness  Negative for tremors, seizures, syncope, facial asymmetry, speech difficulty, weakness, light-headedness, numbness and headaches  Hematological: Does not bruise/bleed easily  Psychiatric/Behavioral: Negative for confusion and decreased concentration         Historical Information   Past Medical History:   Diagnosis Date    Anxiety     Chronic kidney disease (CKD), stage IV (severe) (HCC)     stage IV    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    Hydronephrosis     Hypertension     Hypothyroid     Incontinence     Lung mass     Improving on PET/CT 1/2016    [facility]cy[facility]mia vera [facility] [address] [facility]     Pulmonary embolism ([facility] [address] ) 2014    Urinary tract infection      Past Surgical History:   Procedure Laterality Date    BLADDER SUSPENSION      BOTOX INJECTION N/A 7/27/2016    Procedure: BOTOX INJECTION ;  Surgeon: Angella Sandoval MD;  [facility];  Service:    Kaveh Palmer 62, RADICAL WITH ILEOCONDUIT N/A 10/4/2016    Procedure: Jaimie Nilesher WITH ILEAL CONDUIT ;  Surgeon: Angella Sandoval MD;  [facility];  Service:    Natalie Watts CYSTOSCOPY W/ RETROGRADES Bilateral 7/27/2016    Procedure: Ambika Del Real; RETROGRADE PYELOGRAM ;  Surgeon: Angella Sandoval MD;  [facility];  Service:     [place] COLONOSCOPY FLX DX W/COLLJ [address] PFRMD N/A 8/31/2016    Procedure: COLONOSCOPY;  Surgeon: Doretha Armas MD;  [facility];   Service: Gastroenterology    AK CYSTOSCOPY,INSERT URETERAL STENT Bilateral 7/27/2016    Procedure: STENT INSERTION; EXCISION OF MESH ;  Surgeon: Angella Sandoval MD;  [facility];  Service: Urology    TONSILLECTOMY      TUBAL LIGATION      WISDOM TOOTH EXTRACTION       History   Alcohol Use No     History   Drug Use No     History   Smoking Status    Never Smoker   Smokeless Tobacco    Never Used     Family History   Problem Relation Age of Onset    Cancer Mother         small cell cancer     Heart disease Father     COPD Father     Heart disease Brother     Nephrolithiasis Brother        Meds/Allergies   all current active meds have been reviewed and current meds:   Current Facility-Administered Medications   Medication Dose Route Frequency    acetaminophen (TYLENOL) tablet 975 mg  975 mg Oral Q8H [address]    calcitriol (ROCALTROL) capsule 0 25 mcg  0 25 mcg Oral Once per day on Mon Wed Fri    cholecalciferol (VITAMIN D3) tablet 1,000 Units  1,000 Units Oral Daily    citalopram (CeleXA) tablet 20 mg  20 mg Oral Daily    [START ON 1/12/2019] levothyroxine tablet 75 mcg  75 mcg Oral Early Morning    melatonin tablet 6 mg  6 mg Oral HS    multi-electrolyte (ISOLYTE-S PH 7 4 equivalent) IV solution  125 mL/hr Intravenous Continuous    ondansetron (ZOFRAN) injection 4 mg  4 mg Intravenous Q8H PRN    potassium chloride 20 mEq IVPB (premix)  20 mEq Intravenous Once    saccharomyces boulardii (FLORASTOR) capsule 250 mg  250 mg Oral BID    sodium bicarbonate tablet 650 mg  650 mg Oral BID after meals     No Known Allergies    Objective   I/O     None          Physical Exam   Constitutional: She is oriented to person, place, and time  She appears well-developed and well-nourished  HENT:   Head: Normocephalic and atraumatic  Eyes: Pupils are equal, round, and reactive to light  Conjunctivae and EOM are normal    Neck: Normal range of motion  Cardiovascular: Normal rate and regular rhythm  Pulmonary/Chest: Effort normal and breath sounds normal    Musculoskeletal: Normal range of motion  Neurological: She is alert and oriented to person, place, and time  She has normal strength and normal reflexes  She has a normal Finger-Nose-Finger Test  GCS eye subscore is 4  GCS verbal subscore is 5  GCS motor subscore is 6  She displays no Babinski's sign on the right side  She displays no Babinski's sign on the left side  Reflex Scores:       Tricep reflexes are 2+ on the right side and 2+ on the left side  Bicep reflexes are 2+ on the right side and 2+ on the left side  Brachioradialis reflexes are 2+ on the right side and 2+ on the left side  Patellar reflexes are 2+ on the right side and 2+ on the left side  Achilles reflexes are 2+ on the right side and 2+ on the left side  Psychiatric: Her speech is normal      Neurologic Exam     Mental Status   Oriented to person, place, and time  Oriented to person  Oriented to place  Oriented to country, city and area  Oriented to time  Oriented to year, month and date  Registration: recalls 3 of 3 objects  Recall at 5 minutes: recalls 2 of 3 objects  Attention: normal  Concentration: normal    Speech: speech is normal   Knowledge: good   Able to perform simple calculations  Able to name object  Cranial Nerves     CN II   Visual acuity: normal  Right visual field deficit: none  Left visual field deficit: none     CN III, IV, VI   Pupils are equal, round, and reactive to light  Extraocular motions are normal    Right pupil: Size: 2 mm  Shape: regular  Reactivity: brisk  Left pupil: Size: 2 mm  Shape: regular  Reactivity: brisk  Nystagmus: none     CN V   Right facial sensation deficit: none  Left facial sensation deficit: none    CN XI   CN XI normal      CN XII   CN XII normal      Motor Exam   Muscle bulk: normal  Overall muscle tone: normal  Right arm tone: normal  Left arm tone: normal  Right arm pronator drift: absent  Left arm pronator drift: absent  Right leg tone: normal  Left leg tone: normal    Strength   Strength 5/5 throughout  Sensory Exam   Light touch normal    Proprioception normal    Pinprick normal      Gait, Coordination, and Reflexes     Coordination   Finger to nose coordination: normal    Tremor   Resting tremor: absent    Reflexes   Right brachioradialis: 2+  Left brachioradialis: 2+  Right biceps: 2+  Left biceps: 2+  Right triceps: 2+  Left triceps: 2+  Right patellar: 2+  Left patellar: 2+  Right achilles: 2+  Left achilles: 2+  Right : 2+  Left : 2+  Right plantar: normal  Left plantar: normal  Right Shaw: absent  Left Shaw: absent  Right ankle clonus: absent  Left ankle clonus: absent      Vitals:Blood pressure 139/74, pulse 78, temperature 97 8 °F (36 6 °C), temperature source Oral, resp  rate 18, height 5' (1 524 m), weight 84 4 kg (186 lb), SpO2 100 %  ,Body mass index is 36 33 kg/m²       Lab Results:     Results from last 7 days  Lab Units 01/11/19  1242   WBC Thousand/uL 5 43   HEMOGLOBIN g/dL 11 1*   HEMATOCRIT % 33 5*   PLATELETS Thousands/uL 341   MONO PCT % 4       Results from last 7 days  Lab Units 01/11/19  1242   POTASSIUM mmol/L 3 0*   CHLORIDE mmol/L 108   CO2 mmol/L 19*   BUN mg/dL 36* CREATININE mg/dL 3 08*   CALCIUM mg/dL 8 7   ALK PHOS U/L 94   ALT U/L 11*   AST U/L 20       Results from last 7 days  Lab Units 01/11/19  1242   MAGNESIUM mg/dL 1 8       Results from last 7 days  Lab Units 01/11/19  1242   PHOSPHORUS mg/dL 2 7         No results found for: TROPONINT  ABG:No results found for: PHART, SPG5PRE, PO2ART, UIV1FNG, H5KIEDQD, BEART, SOURCE    Imaging Studies: I have personally reviewed pertinent reports  and I have personally reviewed pertinent films in PACS    EKG, Pathology, and Other Studies: I have personally reviewed pertinent reports  and I have personally reviewed pertinent films in PACS    VTE Prophylaxis: Sequential compression device (Venodyne)     Code Status: Level 1 - Full Code  Advance Directive and Living Will:      Power of :    POLST:      Counseling / Coordination of Care  I spent 20 minutes with the patient

## 2019-01-11 NOTE — ED PROVIDER NOTES
History  Chief Complaint   Patient presents with    Diarrhea     Pt c/o diarrhea for 2 weeks  67 y/o female, hx of CKD stage 4, polycythemia vera, IBS, and PE on eliquis, c/o diarrhea x 5 days  She states that she has had loose stools last week  She reports that she developed abd pain 6 days ago with 1 episode of N/V, which resolved later that day  She states that she has a hx of SBO and pain initially felt like her past SBO  She denies any abd pain since then  States that she was lightheaded 5 days ago and had a syncopal episode- states that she fell to the floor and hit her head on the coffee table  +thinner- eliquis  She states that she was not see at that time  She then developed multiple episodes of watery nonbloody diarrhea- she saw her PCP on Wednesday and was given immodium  She states that diarrhea improved and that she only had 1 episode today  She states; however, that she has had generalized weakness and lighheadedness for the last few days  Denies cp, sob, f/c  Denies blood or black/ tarry stools  Has had diarrhea with past irritable bowel syndrome but states that is worse than past  No recent antibiotics, sick contacts, or travel  No bad food intact or well water intake  History provided by:  Patient  Diarrhea   Quality:  Watery  Severity:  Moderate  Onset quality:  Gradual  Number of episodes:  4-6  Duration:  5 days  Timing:  Intermittent  Progression:  Improving  Relieved by: Anti-motility medications  Worsened by:  Nothing  Associated symptoms: no abdominal pain, no chills, no recent cough, no diaphoresis, no fever, no headaches, no URI and no vomiting        Prior to Admission Medications   Prescriptions Last Dose Informant Patient Reported? Taking? Cholecalciferol (VITAMIN D3) 1000 UNITS CAPS  Self Yes No   Sig: Take 1,000 unit marking on U-100 syringe by mouth daily  JAKAFI 10 MG tablet   No No   Sig: TAKE 1 TABLET BY MOUTH ONE TIME DAILY     SHINGRIX 50 MCG SUSR   Yes No Sig: TO BE ADMINISTERED BY PHARMACIST FOR IMMUNIZATION   WELCHOL 625 MG tablet  Self Yes No   Sig: TAKE 1 TABLET AT BEDTIME AT 8PM   amLODIPine (NORVASC) 10 mg tablet  Self Yes No   Sig: Take 10 mg by mouth daily     apixaban (ELIQUIS) 2 5 mg   No No   Sig: Take 1 tablet (2 5 mg total) by mouth 2 (two) times a day   calcitriol (ROCALTROL) 0 25 mcg capsule   No No   Sig: Take 1 capsule (0 25 mcg total) by mouth every other day for 30 days Take 1 tablet 3 times a week (Monday, Wednesday, Friday)   citalopram (CeleXA) 20 mg tablet  Self Yes No   levothyroxine 75 mcg tablet  Self Yes No   Sig: Take 75 mcg by mouth daily   metoprolol tartrate (LOPRESSOR) 25 mg tablet  Self Yes No   Sig: Take 0 5 tablets by mouth 2 (two) times a day   saccharomyces boulardii (FLORASTOR) 250 mg capsule  Self Yes No   Sig: Take 1 capsule by mouth daily     sodium bicarbonate 650 mg tablet   No No   Sig: Take 1 tablet (650 mg total) by mouth 2 (two) times a day      Facility-Administered Medications: None       Past Medical History:   Diagnosis Date    Anxiety     Chronic kidney disease (CKD), stage IV (severe) (HCC)     stage IV    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    Hydronephrosis     Hypertension     Hypothyroid     Incontinence     Lung mass     Improving on PET/CT 1/2016    [facility]a [facility]     Pulmonary embolism ([facility] ) 2014    Urinary tract infection        Past Surgical History:   Procedure Laterality Date    BLADDER SUSPENSION      BOTOX INJECTION N/A 7/27/2016    Procedure: BOTOX INJECTION ;  Surgeon: Tessa Mcrae MD;  [facility];  Service:     CHOLECYSTECTOMY N/A     COLONOSCOPY      CYSTECTOMY, RADICAL WITH ILEOCONDUIT N/A 10/4/2016    Procedure: SUPRATRIGONAL CYSTECTOMY WITH ILEAL CONDUIT ;  Surgeon: Tessa Mcrae MD;  [facility];  Service:    [employer] CYSTOSCOPY W/ RETROGRADES Bilateral 7/27/2016    Procedure: Aureliano Valencia; RETROGRADE PYELOGRAM ;  Surgeon: Tessa Mcrae MD;  [facility];  Service:     WI COLONOSCOPY FLX DX W/COLLJ SPEC WHEN PFRMD N/A 8/31/2016    Procedure: COLONOSCOPY;  Surgeon: Nasrin Palmer MD;  Location: [facility]; Service: Gastroenterology    WI CYSTOSCOPY,INSERT URETERAL STENT Bilateral 7/27/2016    Procedure: STENT INSERTION; EXCISION OF MESH ;  Surgeon: Glenis Lugo MD;  [facility];  Service: Urology    TONSILLECTOMY      TUBAL LIGATION      WISDOM TOOTH EXTRACTION         Family History   Problem Relation Age of Onset    Cancer Mother         small cell cancer     Heart disease Father     COPD Father     Heart disease Brother     Nephrolithiasis Brother      I have reviewed and agree with the history as documented  Social History   Substance Use Topics    Smoking status: Never Smoker    Smokeless tobacco: Never Used    Alcohol use No        Review of Systems   Constitutional: Negative for chills, diaphoresis and fever  HENT: Negative for congestion, ear pain and sore throat  Eyes: Negative for pain and visual disturbance  Respiratory: Negative for cough, shortness of breath and wheezing  Cardiovascular: Negative for chest pain and leg swelling  Gastrointestinal: Positive for diarrhea  Negative for abdominal pain, nausea and vomiting  Genitourinary: Negative for dysuria, frequency, hematuria and urgency  Musculoskeletal: Negative for neck pain and neck stiffness  Skin: Positive for wound  Negative for rash  Neurological: Negative for weakness, numbness and headaches  Psychiatric/Behavioral: Negative for agitation and confusion  All other systems reviewed and are negative        Physical Exam  ED Triage Vitals [01/11/19 1124]   Temperature Pulse Respirations Blood Pressure SpO2   97 8 °F (36 6 °C) 98 18 121/72 94 %      Temp Source Heart Rate Source Patient Position - Orthostatic VS BP Location FiO2 (%)   Oral Monitor Sitting Left arm --      Pain Score       No Pain           Orthostatic Vital Signs  Vitals:    01/12/19 0700 01/12/19 0800 01/12/19 1000 01/12/19 1200   BP: 131/73 119/68 138/77 151/82   Pulse: 60 60 60 64   Patient Position - Orthostatic VS:           Physical Exam   Constitutional: She is oriented to person, place, and time  She appears well-developed and well-nourished  HENT:   Head: Normocephalic and atraumatic  Right Ear: No hemotympanum  Left Ear: No hemotympanum  Mouth/Throat: Mucous membranes are dry  Dried blood to the right occipital region  No active bleeding  Small abrasion noted    Eyes: Pupils are equal, round, and reactive to light  EOM are normal    Neck: Normal range of motion  Neck supple  No midline C, T, or L spine tenderness  No bony stepoffs    Cardiovascular: Normal rate and regular rhythm  Pulmonary/Chest: Effort normal and breath sounds normal  No respiratory distress  She has no wheezes  She has no rales  She exhibits no tenderness  Abdominal: Soft  Bowel sounds are normal  She exhibits no distension  There is no tenderness  Urostomy bag in place  No drainage, swelling or erythema noted around site    Genitourinary: Rectal exam shows guaiac negative stool  Musculoskeletal: Normal range of motion  Tenderness and ecchymosis to the right hip    Neurological: She is alert and oriented to person, place, and time  No focal deficits   Skin: Skin is warm and dry  Nursing note and vitals reviewed        ED Medications  Medications   acetaminophen (TYLENOL) tablet 975 mg (975 mg Oral Given 1/12/19 0614)   calcitriol (ROCALTROL) capsule 0 25 mcg (0 25 mcg Oral Given 1/11/19 1630)   cholecalciferol (VITAMIN D3) tablet 1,000 Units (1,000 Units Oral Given 1/12/19 0808)   citalopram (CeleXA) tablet 20 mg (20 mg Oral Given 1/12/19 0808)   levothyroxine tablet 75 mcg (75 mcg Oral Given 1/12/19 0614)   sodium bicarbonate tablet 650 mg (650 mg Oral Given 1/12/19 0809)   melatonin tablet 6 mg (6 mg Oral Given 1/11/19 2251)   ondansetron (ZOFRAN) injection 4 mg (not administered)   levETIRAcetam (KEPPRA) tablet 500 mg (500 mg Oral Given 1/12/19 0808)   metroNIDAZOLE (FLAGYL) IVPB (premix) 500 mg (500 mg Intravenous New Bag 1/12/19 0808)   vancomycin (VANCOCIN) oral solution 125 mg (125 mg Oral Not Given 1/12/19 1207)   saccharomyces boulardii (FLORASTOR) capsule 250 mg (not administered)   sodium chloride 0 9 % bolus 1,000 mL (1,000 mL Intravenous New Bag 1/11/19 1322)   tetanus-diphtheria-acellular pertussis (BOOSTRIX) IM injection 0 5 mL (0 5 mL Intramuscular Given 1/11/19 1509)   potassium chloride 20 mEq IVPB (premix) (0 mEq Intravenous Stopped 1/11/19 1515)   potassium chloride (K-DUR,KLOR-CON) CR tablet 40 mEq (40 mEq Oral Given 1/11/19 1502)   potassium chloride 20 mEq IVPB (premix) (20 mEq Intravenous New Bag 1/12/19 1022)       Diagnostic Studies  Results Reviewed     Procedure Component Value Units Date/Time    CBC and differential [[id number]]  (Abnormal) Collected:  01/12/19 0614    Lab Status:  Final result Specimen:  Blood from Arm, Right Updated:  01/12/19 0742     WBC 4 40 Thousand/uL      RBC 3 23 (L) Million/uL      Hemoglobin 9 4 (L) g/dL      Hematocrit 28 8 (L) %      MCV 89 fL      MCH 29 1 pg      MCHC 32 6 g/dL      RDW 14 1 %      MPV 10 1 fL      Platelets 117 Thousands/uL      nRBC 0 /100 WBCs     Narrative: This is an appended report  These results have been appended to a previously verified report      Basic metabolic panel [[id number]]  (Abnormal) Collected:  01/12/19 0614    Lab Status:  Final result Specimen:  Blood from Arm, Right Updated:  01/12/19 0650     Sodium 141 mmol/L      Potassium 3 6 mmol/L      Chloride 112 (H) mmol/L      CO2 20 (L) mmol/L      ANION GAP 9 mmol/L      BUN 27 (H) mg/dL      Creatinine 2 51 (H) mg/dL      Glucose 79 mg/dL      Calcium 7 6 (L) mg/dL      eGFR 19 ml/min/1 73sq m     Narrative:         National Kidney Disease Education Program recommendations are as follows:  GFR calculation is accurate only with a steady state creatinine  Chronic Kidney disease less than 60 ml/min/1 73 sq  meters  Kidney failure less than 15 ml/min/1 73 sq  meters  Clostridium difficile toxin by PCR [[id number]]  (Abnormal) Collected:  01/11/19 2006    Lab Status:  Final result Specimen:  Stool from Per Rectum Updated:  01/12/19 0428     C difficile toxin by PCR POSITIVE for C difficle toxin by PCR  (A)    Fecal leukocytes [[id number]] Collected:  01/12/19 0410    Lab Status: In process Specimen:  Stool from Per Rectum Updated:  01/12/19 0414    Stool Enteric Bacterial Panel by PCR [[id number]] Collected:  01/12/19 0410    Lab Status: In process Specimen:  Stool from Rectum Updated:  01/12/19 0414    Protime-INR [[id number]]  (Abnormal) Collected:  01/12/19 0052    Lab Status:  Final result Specimen:  Blood from Arm, Right Updated:  01/12/19 0221     Protime 16 1 (H) seconds      INR 1 28 (H)    APTT [[id number]]  (Normal) Collected:  01/12/19 0052    Lab Status:  Final result Specimen:  Blood from Arm, Right Updated:  01/12/19 0220     PTT 37 seconds     Rotavirus antigen, stool [[id number]]  (Normal) Collected:  01/11/19 2006    Lab Status:  Final result Specimen:  Stool from Rectum Updated:  01/11/19 2325     Rotavirus Negative    Urine Microscopic [[id number]]  (Abnormal) Collected:  01/11/19 1522    Lab Status:  Final result Specimen:  Urine from Urine, Clean Catch Updated:  01/11/19 1658     RBC, UA Innumerable (A) /hpf      WBC, UA Innumerable (A) /hpf      Epithelial Cells Occasional /hpf      Bacteria, UA Innumerable (A) /hpf     Urine culture [[id number]] Collected:  01/11/19 1522    Lab Status:   In process Specimen:  Urine from Urine, Clean Catch Updated:  01/11/19 1658    POCT urinalysis dipstick [[id number]]  (Normal) Resulted:  01/11/19 1526    Lab Status:  Final result Specimen:  Urine Updated:  01/11/19 1526     Color, UA yellow    ED Urine Macroscopic [[id number]]  (Abnormal) Collected:  01/11/19 1522    Lab Status:  Final result Specimen:  Urine Updated:  01/11/19 1520     Color, UA Brown     Clarity, UA Cloudy     pH, UA 7 0     Leukocytes, UA Large (A)     Nitrite, UA Negative     Protein,  (2+) (A) mg/dl      Glucose, UA Negative mg/dl      Ketones, UA Negative mg/dl      Urobilinogen, UA 0 2 E U /dl      Bilirubin, UA Interference- unable to analyze (A)     Blood, UA Large (A)     Specific [place], UA 1 020    Narrative:       CLINITEK RESULT    CBC and differential [[id number]]  (Abnormal) Collected:  01/11/19 1242    Lab Status:  Final result Specimen:  Blood from Arm, Left Updated:  01/11/19 1410     WBC 5 43 Thousand/uL      RBC 3 76 (L) Million/uL      Hemoglobin 11 1 (L) g/dL      Hematocrit 33 5 (L) %      MCV 89 fL      MCH 29 5 pg      MCHC 33 1 g/dL      RDW 13 9 %      MPV 10 1 fL      Platelets 674 Thousands/uL      nRBC 0 /100 WBCs     Narrative: This is an appended report  These results have been appended to a previously verified report      Troponin I [[id number]]  (Normal) Collected:  01/11/19 1242    Lab Status:  Final result Specimen:  Blood from Arm, Left Updated:  01/11/19 1316     Troponin I <0 02 ng/mL     CMP [[id number]]  (Abnormal) Collected:  01/11/19 1242    Lab Status:  Final result Specimen:  Blood from Arm, Left Updated:  01/11/19 1315     Sodium 139 mmol/L      Potassium 3 0 (L) mmol/L      Chloride 108 mmol/L      CO2 19 (L) mmol/L      ANION GAP 12 mmol/L      BUN 36 (H) mg/dL      Creatinine 3 08 (H) mg/dL      Glucose 124 mg/dL      Calcium 8 7 mg/dL      AST 20 U/L      ALT 11 (L) U/L      Alkaline Phosphatase 94 U/L      Total Protein 6 8 g/dL      Albumin 3 1 (L) g/dL      Total Bilirubin 0 40 mg/dL      eGFR 14 ml/min/1 73sq m     Narrative:         National Kidney Disease Education Program recommendations are as follows:  GFR calculation is accurate only with a steady state creatinine  Chronic Kidney disease less than 60 ml/min/1 73 sq  meters  Kidney failure less than 15 ml/min/1 73 sq  meters  Lipase [[id number]]  (Normal) Collected:  01/11/19 1242    Lab Status:  Final result Specimen:  Blood from Arm, Left Updated:  01/11/19 1315     Lipase 314 u/L     Magnesium [[id number]]  (Normal) Collected:  01/11/19 1242    Lab Status:  Final result Specimen:  Blood from Arm, Left Updated:  01/11/19 1315     Magnesium 1 8 mg/dL     Phosphorus [[id number]]  (Normal) Collected:  01/11/19 1242    Lab Status:  Final result Specimen:  Blood from Arm, Left Updated:  01/11/19 1315     Phosphorus 2 7 mg/dL                  CT head wo contrast   Final Result by Jacqueline Irving MD (01/12 1173)      Stable subdural hemorrhage along left tentorium  No significant mass effect  Slight interval washout of hemorrhagic blood products seen within the occipital horn of the right ventricle  No large delayed intracranial hemorrhage  Workstation performed: [id number]         XR hip/pelv 2-3 vws right if performed   ED Interpretation by Pop Kwon DO (01/11 1549)   No acute fracture       Final Result by Maribel Morgan MD (01/11 1422)      No acute osseous abnormality  Workstation performed: [id number]         CT abdomen pelvis wo contrast   Final Result by Sydnee Bailey DO (01/11 1345)      No acute intra-abdominal abnormality  Nonobstructing right renal calculi and marked left renal atrophy again noted  6 9 cm right adnexal cyst, grossly stable in retrospect  This can be evaluated with nonemergent pelvic ultrasound  Stable appearance of post cystectomy and ileal conduit diversion  Limited study without oral or IV contrast       Workstation performed: [id number]         CT head wo contrast   Final Result by Mac Lopez MD (01/11 1337)      Trace left temporal acute subdural hemorrhage maximum thickness 3 mm extending along the peripheral left tentorium cerebelli  Trace intraventricular hemorrhage in the dependent right occipital horn        Small right posterior superior parietal scalp contusion/hematoma  No skull fracture  Chronic microangiopathy  I personally discussed this study with Kiko Lamar on 1/11/2019 at 1:36 PM                Workstation performed: [id number]               Procedures  ECG 12 Lead Documentation  Date/Time: 1/11/2019 3:39 PM  Performed by: Fina Martino by: Danielle Patiño     Indications / Diagnosis:  Weakness   Patient location:  ED  Rate:     ECG rate:  72    ECG rate assessment: normal    Rhythm:     Rhythm: sinus rhythm    Ectopy:     Ectopy: none    QRS:     QRS axis:  Normal    QRS intervals:  Normal  ST segments:     ST segments: no acute changes   T waves:     T waves comment:  No acute changes           Phone Consults  ED Phone Contact    ED Course  ED Course as of Jan 12 1239   Fri Jan 11, 2019   1405 Will replete Potassium: (!) 3 0   1451 Spoke with Dr Kristin Marin- will see patient and admit                                 MDM  Number of Diagnoses or Management Options  LUANN (acute kidney injury) [facility]): new and requires workup  Diarrhea: new and requires workup  Subdural hematoma ([facility] [address] ): new and requires workup  Syncope: new and requires workup  Diagnosis management comments: Patient with generalized weakness/ syncope/ fall with head injury and diarrhea- will get labs, cardiac workup, ekg, stool cultures, ct abd/ pel to r/o intra-abdominal pathology/ SBO, ct head/ right hip fracture to r/o injury  Patient reevaluated and feels improved  Patient updated on results of tests and plan of care including admission to hospital for further evaluation of presenting complaint  Patient demonstrates verbal understanding and agrees with plan  Report to Dr Kristin Marin  with Trauma for continuation of patient care          Amount and/or Complexity of Data Reviewed  Clinical lab tests: ordered and reviewed  Tests in the radiology section of CPT®: ordered and reviewed  Tests in the medicine section of CPT®: ordered and reviewed  Discussion of test results with the performing providers: yes  Decide to obtain previous medical records or to obtain history from someone other than the patient: yes  Obtain history from someone other than the patient: yes  Review and summarize past medical records: yes  Discuss the patient with other providers: yes  Independent visualization of images, tracings, or specimens: yes    Patient Progress  Patient progress: improved    CritCare Time    Disposition  Final diagnoses:   Subdural hematoma ([facility] )   Diarrhea   Syncope   LUANN (acute kidney injury) ([facility] )     Time reflects when diagnosis was documented in both MDM as applicable and the Disposition within this note     Time User Action Codes Description Comment    1/11/2019  2:46 PM Mervat Davison Add [S06 5X9A] SD[facility] ([facility]dural hematoma[facility] )     1/11/2019  4:29 PM Narayan Kirstin Add [S06 5X9A] Subdural hematoma ([facility] )     1/11/2019  4:29 PM Ptakowski, Marylee Cheek A Add [R19 7] Diarrhea     1/11/2019  4:29 PM Narayan Kirstin Add [R55] Syncope     1/11/2019  4:29 PM Ptakowski, Marylee Cheek A Add [N17 9] LUANN (acute kidney injury) ([facility] )     1/12/2019  8:56 AM Ronny Bence Add [A04 71] Recurrent Clostridium difficile diarrhea       ED Disposition     ED Disposition Condition Comment    Admit  Admitting Physician: Lula Mcconnell   Level of Care: Critical Care [15]   Bed Type: Trauma [7]        Follow-up Information    None         Current Discharge Medication List      CONTINUE these medications which have NOT CHANGED    Details   amLODIPine (NORVASC) 10 mg tablet Take 10 mg by mouth daily        apixaban (ELIQUIS) 2 5 mg Take 1 tablet (2 5 mg total) by mouth 2 (two) times a day  Qty: 30 tablet, Refills: 11    Associated Diagnoses: Polycythemia vera ([facility] );  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)      calcitriol (ROCALTROL) 0 25 mcg capsule Take 1 capsule (0 25 mcg total) by mouth every other day for 30 days Take 1 tablet 3 times a week (Monday, Wednesday, Friday)  Qty: 15 capsule, Refills: 5    Associated Diagnoses: Secondary hyperparathyroidism of renal origin ([address] )      Cholecalciferol (VITAMIN D3) 1000 UNITS CAPS Take 1,000 unit marking on U-100 syringe by mouth daily  citalopram (CeleXA) 20 mg tablet       JAKAFI 10 MG tablet TAKE 1 TABLET BY MOUTH ONE TIME DAILY  Qty: 30 tablet, Refills: 5    Associated Diagnoses: Polycythemia vera (HCC)      levothyroxine 75 mcg tablet Take 75 mcg by mouth daily  Refills: 3      metoprolol tartrate (LOPRESSOR) 25 mg tablet Take 0 5 tablets by mouth 2 (two) times a day      saccharomyces boulardii (FLORASTOR) 250 mg capsule Take 1 capsule by mouth daily        SHINGRIX 50 MCG SUSR TO BE ADMINISTERED BY PHARMACIST FOR IMMUNIZATION  Refills: 0      sodium bicarbonate 650 mg tablet Take 1 tablet (650 mg total) by mouth 2 (two) times a day  Qty: 60 tablet, Refills: 5    Associated Diagnoses: CKD (chronic kidney disease), stage IV (HCC)      WELCHOL 625 MG tablet TAKE 1 TABLET AT BEDTIME AT 8PM  Refills: 2           No discharge procedures on file  ED Provider  Attending physically available and evaluated Zoya Wong I managed the patient along with the ED Attending      Electronically Signed by         Grabiel Duckworth DO  01/12/19 9787

## 2019-01-12 ENCOUNTER — APPOINTMENT (INPATIENT)
Dept: RADIOLOGY | Facility: HOSPITAL | Age: 74
DRG: 083 | End: 2019-01-12
Payer: COMMERCIAL

## 2019-01-12 PROBLEM — A04.71 RECURRENT CLOSTRIDIUM DIFFICILE DIARRHEA: Status: ACTIVE | Noted: 2019-01-12

## 2019-01-12 LAB
ANION GAP SERPL CALCULATED.3IONS-SCNC: 9 MMOL/L (ref 4–13)
APTT PPP: 37 SECONDS (ref 26–38)
BACTERIA UR CULT: NORMAL
BASOPHILS # BLD MANUAL: 0.04 THOUSAND/UL (ref 0–0.1)
BASOPHILS NFR MAR MANUAL: 1 % (ref 0–1)
BUN SERPL-MCNC: 27 MG/DL (ref 5–25)
C DIFF TOX GENS STL QL NAA+PROBE: ABNORMAL
CALCIUM SERPL-MCNC: 7.6 MG/DL (ref 8.3–10.1)
CAMPYLOBACTER DNA SPEC NAA+PROBE: NORMAL
CHLORIDE SERPL-SCNC: 112 MMOL/L (ref 100–108)
CO2 SERPL-SCNC: 20 MMOL/L (ref 21–32)
CREAT SERPL-MCNC: 2.51 MG/DL (ref 0.6–1.3)
EOSINOPHIL # BLD MANUAL: 0.04 THOUSAND/UL (ref 0–0.4)
EOSINOPHIL NFR BLD MANUAL: 1 % (ref 0–6)
ERYTHROCYTE [DISTWIDTH] IN BLOOD BY AUTOMATED COUNT: 14.1 % (ref 11.6–15.1)
GFR SERPL CREATININE-BSD FRML MDRD: 19 ML/MIN/1.73SQ M
GLUCOSE SERPL-MCNC: 79 MG/DL (ref 65–140)
HCT VFR BLD AUTO: 28.8 % (ref 34.8–46.1)
HGB BLD-MCNC: 9.4 G/DL (ref 11.5–15.4)
INR PPP: 1.28 (ref 0.86–1.17)
LG PLATELETS BLD QL SMEAR: PRESENT
LYMPHOCYTES # BLD AUTO: 0.62 THOUSAND/UL (ref 0.6–4.47)
LYMPHOCYTES # BLD AUTO: 14 % (ref 14–44)
MCH RBC QN AUTO: 29.1 PG (ref 26.8–34.3)
MCHC RBC AUTO-ENTMCNC: 32.6 G/DL (ref 31.4–37.4)
MCV RBC AUTO: 89 FL (ref 82–98)
METAMYELOCYTES NFR BLD MANUAL: 2 % (ref 0–1)
MONOCYTES # BLD AUTO: 0.13 THOUSAND/UL (ref 0–1.22)
MONOCYTES NFR BLD: 3 % (ref 4–12)
NEUTROPHILS # BLD MANUAL: 3.34 THOUSAND/UL (ref 1.85–7.62)
NEUTS SEG NFR BLD AUTO: 76 % (ref 43–75)
NRBC BLD AUTO-RTO: 0 /100 WBCS
PLATELET # BLD AUTO: 253 THOUSANDS/UL (ref 149–390)
PLATELET BLD QL SMEAR: ADEQUATE
PMV BLD AUTO: 10.1 FL (ref 8.9–12.7)
POIKILOCYTOSIS BLD QL SMEAR: PRESENT
POTASSIUM SERPL-SCNC: 3.6 MMOL/L (ref 3.5–5.3)
PROTHROMBIN TIME: 16.1 SECONDS (ref 11.8–14.2)
RBC # BLD AUTO: 3.23 MILLION/UL (ref 3.81–5.12)
RBC MORPH BLD: PRESENT
SALMONELLA DNA SPEC QL NAA+PROBE: NORMAL
SHIGA TOXIN STX GENE SPEC NAA+PROBE: NORMAL
SHIGELLA DNA SPEC QL NAA+PROBE: NORMAL
SODIUM SERPL-SCNC: 141 MMOL/L (ref 136–145)
VARIANT LYMPHS # BLD AUTO: 3 %
WBC # BLD AUTO: 4.4 THOUSAND/UL (ref 4.31–10.16)

## 2019-01-12 PROCEDURE — 85730 THROMBOPLASTIN TIME PARTIAL: CPT | Performed by: NURSE PRACTITIONER

## 2019-01-12 PROCEDURE — 99232 SBSQ HOSP IP/OBS MODERATE 35: CPT | Performed by: SURGERY

## 2019-01-12 PROCEDURE — 80048 BASIC METABOLIC PNL TOTAL CA: CPT | Performed by: NURSE PRACTITIONER

## 2019-01-12 PROCEDURE — 70450 CT HEAD/BRAIN W/O DYE: CPT

## 2019-01-12 PROCEDURE — 99223 1ST HOSP IP/OBS HIGH 75: CPT | Performed by: INTERNAL MEDICINE

## 2019-01-12 PROCEDURE — 85027 COMPLETE CBC AUTOMATED: CPT | Performed by: NURSE PRACTITIONER

## 2019-01-12 PROCEDURE — 85007 BL SMEAR W/DIFF WBC COUNT: CPT | Performed by: NURSE PRACTITIONER

## 2019-01-12 PROCEDURE — 89055 LEUKOCYTE ASSESSMENT FECAL: CPT | Performed by: EMERGENCY MEDICINE

## 2019-01-12 PROCEDURE — 87505 NFCT AGENT DETECTION GI: CPT | Performed by: EMERGENCY MEDICINE

## 2019-01-12 PROCEDURE — 99233 SBSQ HOSP IP/OBS HIGH 50: CPT | Performed by: NEUROLOGICAL SURGERY

## 2019-01-12 PROCEDURE — 85610 PROTHROMBIN TIME: CPT | Performed by: NURSE PRACTITIONER

## 2019-01-12 RX ORDER — SACCHAROMYCES BOULARDII 250 MG
250 CAPSULE ORAL DAILY
Status: DISCONTINUED | OUTPATIENT
Start: 2019-01-13 | End: 2019-01-14 | Stop reason: HOSPADM

## 2019-01-12 RX ORDER — POTASSIUM CHLORIDE 14.9 MG/ML
20 INJECTION INTRAVENOUS ONCE
Status: COMPLETED | OUTPATIENT
Start: 2019-01-12 | End: 2019-01-12

## 2019-01-12 RX ADMIN — CITALOPRAM HYDROBROMIDE 20 MG: 20 TABLET ORAL at 08:08

## 2019-01-12 RX ADMIN — VANCOMYCIN HYDROCHLORIDE 125 MG: 500 INJECTION, POWDER, LYOPHILIZED, FOR SOLUTION INTRAVENOUS at 10:22

## 2019-01-12 RX ADMIN — SODIUM CHLORIDE, SODIUM GLUCONATE, SODIUM ACETATE, POTASSIUM CHLORIDE, MAGNESIUM CHLORIDE, SODIUM PHOSPHATE, DIBASIC, AND POTASSIUM PHOSPHATE 125 ML/HR: .53; .5; .37; .037; .03; .012; .00082 INJECTION, SOLUTION INTRAVENOUS at 01:09

## 2019-01-12 RX ADMIN — LEVOTHYROXINE SODIUM 75 MCG: 75 TABLET ORAL at 06:14

## 2019-01-12 RX ADMIN — SODIUM BICARBONATE 650 MG TABLET 650 MG: at 08:09

## 2019-01-12 RX ADMIN — Medication 250 MG: at 08:10

## 2019-01-12 RX ADMIN — ACETAMINOPHEN 975 MG: 325 TABLET, FILM COATED ORAL at 22:38

## 2019-01-12 RX ADMIN — ACETAMINOPHEN 975 MG: 325 TABLET, FILM COATED ORAL at 15:46

## 2019-01-12 RX ADMIN — METRONIDAZOLE 500 MG: 500 INJECTION, SOLUTION INTRAVENOUS at 08:08

## 2019-01-12 RX ADMIN — POTASSIUM CHLORIDE 20 MEQ: 200 INJECTION, SOLUTION INTRAVENOUS at 10:22

## 2019-01-12 RX ADMIN — VANCOMYCIN HYDROCHLORIDE 125 MG: 500 INJECTION, POWDER, LYOPHILIZED, FOR SOLUTION INTRAVENOUS at 22:38

## 2019-01-12 RX ADMIN — VITAMIN D, TAB 1000IU (100/BT) 1000 UNITS: 25 TAB at 08:08

## 2019-01-12 RX ADMIN — LEVETIRACETAM 500 MG: 500 TABLET, FILM COATED ORAL at 22:38

## 2019-01-12 RX ADMIN — SODIUM BICARBONATE 650 MG TABLET 650 MG: at 22:38

## 2019-01-12 RX ADMIN — ACETAMINOPHEN 975 MG: 325 TABLET, FILM COATED ORAL at 06:14

## 2019-01-12 RX ADMIN — VANCOMYCIN HYDROCHLORIDE 125 MG: 500 INJECTION, POWDER, LYOPHILIZED, FOR SOLUTION INTRAVENOUS at 15:46

## 2019-01-12 RX ADMIN — LEVETIRACETAM 500 MG: 500 TABLET, FILM COATED ORAL at 08:08

## 2019-01-12 RX ADMIN — MELATONIN 6 MG: at 22:38

## 2019-01-12 NOTE — CONSULTS
Consultation - Infectious Disease   Carroll Pdailla 68 y o  female MRN: [id number]  Unit/Bed#: MICU 08 Encounter: [id number]      IMPRESSION & RECOMMENDATIONS:   Impression/Recommendations: This is a 68 y o  female, with multiple medical problems, presented the ER with 2 week history of diarrhea and found to have C difficile colitis  Patient also has left-sided SDH from a recent fall  1  Recurrent C difficile colitis  Patient is clinically well, with benign abdominal exam and no evidence of significant colitis on abdomen/pelvis CT  It would be unusual to see relapsing C difficile colitis after 2 years  More likely would be recurrent C difficile colitis secondary to affective recent systemic antibiotic  However, patient denies any recent antibiotic  Outpatient record review did not reveal any recent antibiotic  If this is truly a recurrent C difficile colitis episode, not precipitated by any recent systemic antibiotic, patient may have significant disruption of her colonic ravi  In which case, she would be at risk for needing fecal transplantation  For now, would treat patient with a somewhat prolonged tapering course of p o  Vancomycin  Given mild clinical illness and lack of ileus, IV Flagyl is not necessary  Continue p o  vancomycin  No need for IV Flagyl  I will discontinue  Treat with a prolonged tapering course  If patient relapses, she will need fecal transplantation  2  Left-sided SDH, secondary to fall  No evidence of progression  Patient is neurologically intact  Monitor    3  Abnormal UA, and probable asymptomatic bacteriuria  This is likely bladder colonization  No antibiotic needed for this  4  CKD, stage IV  P o  Vancomycin does not need dosage adjustment  Monitor creatinine  All prior hospitalization and outpatient records reviewed in detail  Discussed with patient in detail regarding the above plan  Discussed with surgery service      Thank you for this consultation  We will follow along with you  HISTORY OF PRESENT ILLNESS:  Reason for Consult:  Recurrent C difficile colitis  HPI: Leatha Tillman is a 68 y o  female, with multiple medical problems including history of C difficile colitis in the past, developed diarrhea 2 weeks ago  She did not have stool checked for C diff as an outpatient  She was started on Imodium by her PCP  Diarrhea worsen  A week ago, patient was going to the bathroom when she became lightheaded, fell and struck her head on the coffee table  She actually came to the ER yesterday due to persistent diarrhea  On presentation, head CT revealed left-sided SDH  Patient was admitted to ICU  Due to diarrhea, stool was sent for C diff toxin  This came back positive  Patient was started on p o  Vancomycin/IV Flagyl  We are asked to evaluate the patient  Patient's last episode C difficile colitis was in April 2017  Although patient has history of recurrent UTI, she states she has not had any systemic antibiotic recently  As stated above, 2 weeks ago, she started having diarrhea  No abdominal pain  At present, patient feels quite well  No headache  No abdominal discomfort  REVIEW OF SYSTEMS:  A complete 12 point system-based review was done  Except for what is noted in HPI above, ROS of systems is otherwise negative      PAST MEDICAL HISTORY:  Past Medical History:   Diagnosis Date    Anxiety     Chronic kidney disease (CKD), stage IV (severe) (HCC)     stage IV    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    Hydronephrosis     Hypertension     Hypothyroid     Incontinence     Lung mass     Improving on PET/CT 1/2016    [facility]a [facility]     Pulmonary embolism ([facility] ) 2014    Urinary tract infection      Past Surgical History:   Procedure Laterality Date    BLADDER SUSPENSION      BOTOX INJECTION N/A 7/27/2016    Procedure: BOTOX INJECTION ;  Surgeon: Kristin Macedo MD;  Location: [facility] OR;  Service:     CHOLECYSTECTOMY N/A     COLONOSCOPY      CYSTECTOMY, RADICAL WITH ILEOCONDUIT N/A 10/4/2016    Procedure: SUPRATRIGONAL CYSTECTOMY WITH ILEAL CONDUIT ;  Surgeon: Gunner Cody MD;  [facility];  Service:    [facility] CYSTOSCOPY W/ RETROGRADES Bilateral 2016    Procedure: Frank Flash; RETROGRADE PYELOGRAM ;  Surgeon: Gunner Cody MD;  [facility];  Service:     MO COLONOSCOPY FLX DX W/COLLJ [address] PFRMD N/A 2016    Procedure: COLONOSCOPY;  Surgeon: Nellie Parrish MD;  Location: [facility]; Service: Gastroenterology    MO CYSTOSCOPY,INSERT URETERAL STENT Bilateral 2016    Procedure: STENT INSERTION; EXCISION OF MESH ;  Surgeon: Gunner Cody MD;  [facility];  Service: Urology    TONSILLECTOMY      TUBAL LIGATION      WISDOM TOOTH EXTRACTION       Problem list reviewed  FAMILY HISTORY:  Non-contributory    SOCIAL HISTORY:  History   Alcohol Use No     History   Drug Use No     History   Smoking Status    Never Smoker   Smokeless Tobacco    Never Used       ALLERGIES:  Allergies   Allergen Reactions    Chlorhexidine Rash     petichi like rash when using chlorhexidine swabs prior to IV  MEDICATIONS:  All current active medications have been reviewed  Patient is currently on p o  Vancomycin/IV Flagyl  PHYSICAL EXAM:  Vitals:  Temp:  [97 3 °F (36 3 °C)-98 1 °F (36 7 °C)] 98 °F (36 7 °C)  HR:  [58-80] 64  Resp:  [16-20] 16  BP: (118-166)/(68-95) 151/82  SpO2:  [93 %-100 %] 100 %  Temp (24hrs), Av 9 °F (36 6 °C), Min:97 3 °F (36 3 °C), Max:98 1 °F (36 7 °C)  Current: Temperature: 98 °F (36 7 °C)     Physical Exam:  General:  Obese, comfortable, nontoxic, in no acute distress  Awake, alert and oriented x 3    Eyes:  Conjunctive clear with no hemorrhages or effusions  Oropharynx:  No ulcers, no lesions, pharynx benign, no tonsillitis  Neck:  Supple, no lymphadenopathy, no mass, nontender  Lungs:  Expansion symmetric, no rales, no wheezing, no accessory muscle use  Cardiac:  Regular rate and rhythm, normal S1, normal S2, no murmurs  Abdomen:  Soft, nondistended, non-tender, no HSM  Extremities:  Trace edema, no erythema, nontender  No ulcers  Skin:  No rashes, no ulcers  Neurological:  Moves all four extremities spontaneously, sensation grossly intact    LABS, IMAGING, & OTHER STUDIES:  Lab Results:  I have personally reviewed pertinent labs  Results from last 7 days  Lab Units 01/12/19  0614 01/11/19  1242   POTASSIUM mmol/L 3 6 3 0*   CHLORIDE mmol/L 112* 108   CO2 mmol/L 20* 19*   BUN mg/dL 27* 36*   CREATININE mg/dL 2 51* 3 08*   EGFR ml/min/1 73sq m 19 14   CALCIUM mg/dL 7 6* 8 7   AST U/L  --  20   ALT U/L  --  11*   ALK PHOS U/L  --  94       Results from last 7 days  Lab Units 01/12/19  0614 01/11/19  1242   WBC Thousand/uL 4 40 5 43   HEMOGLOBIN g/dL 9 4* 11 1*   PLATELETS Thousands/uL 253 341       Results from last 7 days  Lab Units 01/11/19 2006   C DIFF TOXIN B  POSITIVE for C difficle toxin by PCR  *       Imaging Studies:   I have personally reviewed pertinent imaging study reports and images in PACS  Head CT reviewed personally  Left-sided subdural hemorrhage  Abdomen/pelvis CT reviewed personally  No intra-abdominal abnormality  EKG, Pathology, and Other Studies:   I have personally reviewed pertinent reports

## 2019-01-12 NOTE — PROGRESS NOTES
Progress Note - Neurosurgery   Genesis Ip 68 y o  female MRN: [id number]  Unit/Bed#: MICU 08 Encounter: [id number]    Assessment:  42-year-old woman mass right right intraventricular hemorrhage and left subdural hematoma, stable on serial imaging  Neurologically intact  Plan:  1  Continue to monitor neurological examination  Repeat CT head if GCS drops more than 12 points  2  Repeat CT head on Monday morning, and if stable and or improved consider reinitiate in antiplatelet/anticoagulation for DVT prophylaxis given patient's history  In the interim hold antiplatelet/anticoagulation medication  3   Neurosurgery will follow up on results of CT head on Monday morning  Contact service if any concerns arise in the interim  VTE Prophylaxis: Sequential compression device (Venodyne)  and Reason for no pharmacologic prophylaxis Intracranial hemorrhage  Subjective/Objective   Chief Complaint:  None  Vitals: Blood pressure 119/70, pulse 58, temperature 98 °F (36 7 °C), temperature source Oral, resp  rate 16, height 5' (1 524 m), weight 85 6 kg (188 lb 11 4 oz), SpO2 94 %  ,Body mass index is 36 86 kg/m²  Hemodynamic Monitoring:  None    Physical Exam:     Physical Exam   Constitutional: She is oriented to person, place, and time  She appears well-developed and well-nourished  No distress  Neurological: She is alert and oriented to person, place, and time  She has normal strength  No cranial nerve deficit or sensory deficit (No sensory extinction or neglect  )  Coordination normal  GCS eye subscore is 4  GCS verbal subscore is 5  GCS motor subscore is 6  No pronator or parietal drift  Speech clear  Skin: Skin is warm and dry  Psychiatric: She has a normal mood and affect  Her behavior is normal    Vitals reviewed  Neurologic Exam     Mental Status   Oriented to person, place, and time  Motor Exam     Strength   Strength 5/5 throughout             Invasive Devices     Peripheral Intravenous Line            Peripheral IV 01/11/19 Right Antecubital less than 1 day    Peripheral IV 01/11/19 Right Forearm less than 1 day          Drain            Urostomy Ileal conduit  days                          Lab Results:   I have personally reviewed pertinent results  Lab Results   Component Value Date    WBC 4 40 01/12/2019    HGB 9 4 (L) 01/12/2019    HCT 28 8 (L) 01/12/2019    MCV 89 01/12/2019     01/12/2019    MCH 29 1 01/12/2019    MCHC 32 6 01/12/2019    RDW 14 1 01/12/2019    MPV 10 1 01/12/2019    NRBC 0 01/11/2019    SODIUM 141 01/12/2019     (H) 01/12/2019    CO2 20 (L) 01/12/2019    BUN 27 (H) 01/12/2019    CREATININE 2 51 (H) 01/12/2019    CALCIUM 7 6 (L) 01/12/2019    AST 20 01/11/2019    ALT 11 (L) 01/11/2019    ALKPHOS 94 01/11/2019    EGFR 19 01/12/2019    COLORU yellow 01/11/2019    CLARITYU Cloudy 01/11/2019    SPECGRAV 1 020 01/11/2019    PHUR 7 0 01/11/2019    LEUKOCYTESUR Large (A) 01/11/2019    NITRITE Negative 01/11/2019    GLUCOSEU Negative 01/11/2019    KETONESU Negative 01/11/2019    BILIRUBINUR Interference- unable to analyze (A) 01/11/2019    BLOODU Large (A) 01/11/2019    INR 1 28 (H) 01/12/2019       Imaging Studies: I have personally reviewed pertinent reports  and I have personally reviewed pertinent films in PACS    CT scan of the head dated January 12th, 2019  Comparison to previous imaging from January 11th, 2019  Stable appearance of right small intraventricular hemorrhage and left tentorial subdural hemorrhage  No brain compression or mass effect  No new intra-axial or extra-axial lesions  Overall stable examination    Other Studies:  None

## 2019-01-12 NOTE — UTILIZATION REVIEW
[address]  Utilization Review Department  Phone: [phone]; Fax [phone]  [email or web address]  ATTENTION: Please call with any questions or concerns to [phone]  and carefully listen to the prompts so that you are directed to the right person  Send all requests for admission clinical reviews, approved or denied determinations and any other requests to fax [phone]  All voicemails are confidential     Initial Clinical Review    Admission: Date/Time/Statement: 1/11/19 @ 1455   Orders Placed This Encounter   Procedures    Inpatient Admission     Standing Status:   Standing     Number of Occurrences:   1     Order Specific Question:   Admitting Physician     Answer:   Luna Rodriguez     Order Specific Question:   Level of Care     Answer:   Critical Care [15]     Order Specific Question:   Bed Type     Answer:   Trauma [7]     Order Specific Question:   Estimated length of stay     Answer:   More than 2 Midnights     Order Specific Question:   Certification     Answer:   I certify that inpatient services are medically necessary for this patient for a duration of greater than two midnights  See H&P and MD Progress Notes for additional information about the patient's course of treatment  ED: Date/Time/Mode of Arrival:   ED Arrival Information     Expected Arrival Acuity Means of Arrival Escorted By Service Admission Type    - 1/11/2019 11:17 Emergent Walk-In Self Surgery-General Emergency    Arrival Complaint    diarrhea        Chief Complaint:   Chief Complaint   Patient presents with    Diarrhea     Pt c/o diarrhea for 2 weeks  History of Illness:  68 y o  female who presents with SDH after a fall on Sunday  She reports she felt dizzy then appears to have syncopized, waking up on the floor presumably striking her head on the coffee table  She elicits a small amount of bleeding from her head but otherwise felt ok   She reports leading up to this fall she has had diarrhea for the past 2 weeks  She reports her stools are loose and "all water it seems like " She reports she has had C  Diff in the past but states this is different than those experiences  She was instructed to try imodium by her PCP which helped slow down the frequency of diarrhea but gave her increased gas and bloating pains  She presented to the ER today for work-up of her diarrhea at which point her scalp abrasion was noted and a head CT was ordered given that she remains on eliquis  She denies any nausea or vomiting but admits she is likely dehydrated as she has not been eating and drinking much because " it all just goes right through me "      ED Vital Signs:   ED Triage Vitals [01/11/19 1124]   Temperature Pulse Respirations Blood Pressure SpO2   97 8 °F (36 6 °C) 98 18 121/72 94 %      Temp Source Heart Rate Source Patient Position - Orthostatic VS BP Location FiO2 (%)   Oral Monitor Sitting Left arm --      Pain Score       No Pain        Wt Readings from Last 1 Encounters:   01/11/19 85 6 kg (188 lb 11 4 oz)     Vital Signs (abnormal): WNL  Pertinent Labs/Diagnostic Test Results: K 3 0, CO2 19, BUN 36, CR 3 08, ALT 11, ALBUMIN 3 1, HGB 11 1, HCT 33 5   Ref Range & Units 01/11/19 2006   C difficile toxin by PCR NEGATIVE for C difficle toxin by PCR  POSITIVE for C difficle toxin by PCR  UA -- (+) LGE BLOOD, LGE LEUKS    CT HEAD -- Trace left temporal acute subdural hemorrhage maximum thickness 3 mm extending along the peripheral left tentorium cerebelli  Trace intraventricular hemorrhage in the dependent right occipital horn  Small right posterior superior parietal scalp contusion/hematoma  No skull fracture  Chronic microangiopathy      ED Treatment:   Medication Administration from 01/11/2019 1117 to 01/11/2019 1724       Date/Time Order Dose Route Action     01/11/2019 1322 sodium chloride 0 9 % bolus 1,000 mL 1,000 mL Intravenous New Bag     01/11/2019 7868 tetanus-diphtheria-acellular pertussis (BOOSTRIX) IM injection 0 5 mL 0 5 mL Intramuscular Given     01/11/2019 1502 potassium chloride 20 mEq IVPB (premix) 20 mEq Intravenous New Bag     01/11/2019 1502 potassium chloride (K-DUR,KLOR-CON) CR tablet 40 mEq 40 mEq Oral Given     01/11/2019 1515 multi-electrolyte (ISOLYTE-S PH 7 4 equivalent) IV solution 125 mL/hr Intravenous New Bag     01/11/2019 1510 acetaminophen (TYLENOL) tablet 975 mg 975 mg Oral Given     01/11/2019 1630 calcitriol (ROCALTROL) capsule 0 25 mcg 0 25 mcg Oral Given     Past Medical/Surgical History:    Active Ambulatory Problems     Diagnosis Date Noted    Acute kidney injury superimposed on chronic kidney disease ([facility] ) 10/07/2016    Chronic saddle pulmonary embolism ([facility] ) 10/07/2016    Bladder mass 10/07/2016    Small bowel obstruction ([place] [address] ) 02/09/2018    Obstructive uropathy 05/17/2018    Secondary hyperparathyroidism of renal origin ([facility] ) 10/05/2018    Hypothyroidism 10/06/2018    Hypertension 10/06/2018    Polycythemia vera ([facility] ) 10/23/2018     Past Medical History:   Diagnosis Date    Anxiety     Chronic kidney disease (CKD), stage IV (severe) (HCC)     Chronic thrombosis of subclavian vein (HCC)     Hydronephrosis     Hypertension     Hypothyroid     Incontinence     Lung mass     [facility]     Pulmonary embolism ([facility] ) 2014    Urinary tract infection      Admitting Diagnosis: Diarrhea [R19 7]  Syncope [R55]  Subdural hematoma (HCC) [S06 5X9A]  SDH (subdural hematoma) ([facility] ) [S06 5X9A]  LUANN (acute kidney injury) ([facility] ) [N17 9]  Age/Sex: 68 y o  female     Assessment/Plan:   Trauma Active Problems:   SDH  Diarrhea  Chronic saddle PE, Hx DVTs   Polycythemia Vera   Eliquis anticoagulopathy  LUANN on CKD  Ileal conduit   syncope     Trauma Plan:   Admit to ICU for SDH on noval agent  Will hold off on reversal at this time given fall was 5 days ago and given history of chronic saddle PE  Q1 hour neuro checks - GCS 15, no neuro deficits now  C diff and stool labs ordered   Rehydrate with isolate   Follow-up AM BMP       Admission Orders:  Scheduled Meds:   Current Facility-Administered Medications:  acetaminophen 975 mg Oral Q8H [address]    calcitriol 0 25 mcg Oral Once per day on Mon Wed Fri    cholecalciferol 1,000 Units Oral Daily    citalopram 20 mg Oral Daily    levETIRAcetam 500 mg Oral Q12H [address]    levothyroxine 75 mcg Oral Early Morning    melatonin 6 mg Oral HS    metroNIDAZOLE 500 mg Intravenous Q8H Last Rate: 500 mg (01/12/19 0808)   ondansetron 4 mg Intravenous Q8H PRN    potassium chloride 20 mEq Intravenous Once Last Rate: 20 mEq (01/12/19 1022)   [START ON 1/13/2019] saccharomyces boulardii 250 mg Oral Daily    sodium bicarbonate 650 mg Oral BID after meals    vancomycin 125 mg Oral Q6H [address]      ADMIT TO MICU  TELEM  CONTACT ISOLATION  NEURO CHECKS Q1H  IS Q 1H WHILE AWAKE  ENCOURAGE DEEP BREATHING AND COUGHING  UP IN CHAIR 3X/DAY  REG DIET  I/O  CONSULT NS, ID  REPEAT CT HEAD  STOOL FOR C-DIFF  PT/OT EVALS

## 2019-01-12 NOTE — PROGRESS NOTES
Progress Note - ICU Transfer to Step down/med  surg  - Zoya Wong 68 y o  female MRN: [id number]    Unit/Bed#: MICU 08 Encounter: [id number]    Code Status: Level 1 - Full Code  POA:    POLST:      Reason for ICU adm: Subdural hematoma on Eliquis    Active problems:   Patient Active Problem List   Diagnosis    Acute kidney [facility] superimposed on chronic kidney disease [facility]    Chronic saddle pulmonary embolism ([facility]    Bladder mass    Small bowel obstruction ([facility]    Obstructive uropathy    Secondary hyperparathyroidism of renal origin ([facility]    Hypothyroidism    Hypertension    Polycythemia vera ([facility] )    Fall    Subdural hematoma, acute ([facility]    Intraventricular hemorrhage ([facility]    Diarrhea of presumed infectious origin    Hypokalemia    Recurrent Clostridium difficile diarrhea       Consultants: Neurosurgery, Infectious Disease    History of Present Illness/Summary of clinical course:   69 y/o female presented to the hospital on 11/1 due to persistent diarrhea for appx 2 weeks  She had worsening diarrhea on 1/6 and was feeling very weak/lightheaded and fell at that time, hitting her head  She was found on evaluation in the ED to have a L temporal SDH/IVH  She was a GCS 15 and Eliquis was held, but not reversed due to a history of polycythemia vera and saddle PE/SC vein DVT in the setting of a fall one week prior to evaluation  She was placed in the ICU for close neurological monitoring/q1h neuro checks  She remains a GCS 15 and denies any neurologic symptoms/headahce/etc  She had a repeat CT head this morning (1/12) which was stable  Neurosurgery is following and plans to repeat an additional CT head on Monday 1/14, and if stable at that time, will plan to resume Eliquis then  In the meantime will hold on starting SQH due to last dose of Eliquis being on 1/11 and prolonged half-life  She is on keppra for 7 days for seizure prophylaxis as well  Regarding her C   Diff, her bowel movements are improved, having only 2 in the last 24 hours  This is recurrent C  Diff, as she has been treated twice in the last 3 years (10/2016 and 4/2017) with oral vancomycin  ID consultation was placed due to this being her third case of C  Diff and is pending at this time  Recent or scheduled procedures: None    Outstanding/pending diagnostics: 1/12 CT head        Mobilization Plan: OOB with PT/OT    Nutrition Plan: Regular diet    Discharge Plan:     Initial PT/OT/ST Recommendations: eval pending   Initial /Plan: pending eval      Portions of the record may have been created with voice recognition software  Occasional wrong word or "sound a like" substitutions may have occurred due to the inherent limitations of voice recognition software  Read the chart carefully and recognize, using context, where substitutions have occurred      Primo Santos PA-C

## 2019-01-13 LAB
ANION GAP SERPL CALCULATED.3IONS-SCNC: 11 MMOL/L (ref 4–13)
ANISOCYTOSIS BLD QL SMEAR: PRESENT
BASOPHILS # BLD MANUAL: 0 THOUSAND/UL (ref 0–0.1)
BASOPHILS NFR MAR MANUAL: 0 % (ref 0–1)
BUN SERPL-MCNC: 27 MG/DL (ref 5–25)
CALCIUM SERPL-MCNC: 8.2 MG/DL (ref 8.3–10.1)
CHLORIDE SERPL-SCNC: 111 MMOL/L (ref 100–108)
CO2 SERPL-SCNC: 20 MMOL/L (ref 21–32)
CREAT SERPL-MCNC: 2.58 MG/DL (ref 0.6–1.3)
EOSINOPHIL # BLD MANUAL: 0.1 THOUSAND/UL (ref 0–0.4)
EOSINOPHIL NFR BLD MANUAL: 2 % (ref 0–6)
ERYTHROCYTE [DISTWIDTH] IN BLOOD BY AUTOMATED COUNT: 14.2 % (ref 11.6–15.1)
GFR SERPL CREATININE-BSD FRML MDRD: 18 ML/MIN/1.73SQ M
GLUCOSE SERPL-MCNC: 92 MG/DL (ref 65–140)
HCT VFR BLD AUTO: 29.2 % (ref 34.8–46.1)
HGB BLD-MCNC: 9.5 G/DL (ref 11.5–15.4)
LYMPHOCYTES # BLD AUTO: 0.34 THOUSAND/UL (ref 0.6–4.47)
LYMPHOCYTES # BLD AUTO: 7 % (ref 14–44)
MCH RBC QN AUTO: 29.3 PG (ref 26.8–34.3)
MCHC RBC AUTO-ENTMCNC: 32.5 G/DL (ref 31.4–37.4)
MCV RBC AUTO: 90 FL (ref 82–98)
MONOCYTES # BLD AUTO: 0.19 THOUSAND/UL (ref 0–1.22)
MONOCYTES NFR BLD: 4 % (ref 4–12)
NEUTROPHILS # BLD MANUAL: 4.18 THOUSAND/UL (ref 1.85–7.62)
NEUTS SEG NFR BLD AUTO: 87 % (ref 43–75)
NRBC BLD AUTO-RTO: 0 /100 WBCS
PLATELET # BLD AUTO: 299 THOUSANDS/UL (ref 149–390)
PLATELET BLD QL SMEAR: ADEQUATE
PMV BLD AUTO: 10.1 FL (ref 8.9–12.7)
POIKILOCYTOSIS BLD QL SMEAR: PRESENT
POLYCHROMASIA BLD QL SMEAR: PRESENT
POTASSIUM SERPL-SCNC: 3.8 MMOL/L (ref 3.5–5.3)
RBC # BLD AUTO: 3.24 MILLION/UL (ref 3.81–5.12)
RBC MORPH BLD: PRESENT
SODIUM SERPL-SCNC: 142 MMOL/L (ref 136–145)
WBC # BLD AUTO: 4.81 THOUSAND/UL (ref 4.31–10.16)

## 2019-01-13 PROCEDURE — 99232 SBSQ HOSP IP/OBS MODERATE 35: CPT | Performed by: INTERNAL MEDICINE

## 2019-01-13 PROCEDURE — G8988 SELF CARE GOAL STATUS: HCPCS

## 2019-01-13 PROCEDURE — G8979 MOBILITY GOAL STATUS: HCPCS

## 2019-01-13 PROCEDURE — 99233 SBSQ HOSP IP/OBS HIGH 50: CPT | Performed by: PHYSICIAN ASSISTANT

## 2019-01-13 PROCEDURE — 85027 COMPLETE CBC AUTOMATED: CPT | Performed by: PHYSICIAN ASSISTANT

## 2019-01-13 PROCEDURE — 97166 OT EVAL MOD COMPLEX 45 MIN: CPT

## 2019-01-13 PROCEDURE — 80048 BASIC METABOLIC PNL TOTAL CA: CPT | Performed by: PHYSICIAN ASSISTANT

## 2019-01-13 PROCEDURE — 97161 PT EVAL LOW COMPLEX 20 MIN: CPT

## 2019-01-13 PROCEDURE — G8978 MOBILITY CURRENT STATUS: HCPCS

## 2019-01-13 PROCEDURE — 85007 BL SMEAR W/DIFF WBC COUNT: CPT | Performed by: PHYSICIAN ASSISTANT

## 2019-01-13 PROCEDURE — G8980 MOBILITY D/C STATUS: HCPCS

## 2019-01-13 PROCEDURE — G8987 SELF CARE CURRENT STATUS: HCPCS

## 2019-01-13 PROCEDURE — G8989 SELF CARE D/C STATUS: HCPCS

## 2019-01-13 RX ADMIN — SODIUM BICARBONATE 650 MG TABLET 650 MG: at 17:41

## 2019-01-13 RX ADMIN — VITAMIN D, TAB 1000IU (100/BT) 1000 UNITS: 25 TAB at 08:54

## 2019-01-13 RX ADMIN — LEVOTHYROXINE SODIUM 75 MCG: 75 TABLET ORAL at 05:34

## 2019-01-13 RX ADMIN — VANCOMYCIN HYDROCHLORIDE 125 MG: 500 INJECTION, POWDER, LYOPHILIZED, FOR SOLUTION INTRAVENOUS at 17:41

## 2019-01-13 RX ADMIN — LEVETIRACETAM 500 MG: 500 TABLET, FILM COATED ORAL at 08:54

## 2019-01-13 RX ADMIN — MELATONIN 6 MG: at 22:37

## 2019-01-13 RX ADMIN — LEVETIRACETAM 500 MG: 500 TABLET, FILM COATED ORAL at 22:37

## 2019-01-13 RX ADMIN — ACETAMINOPHEN 975 MG: 325 TABLET, FILM COATED ORAL at 05:34

## 2019-01-13 RX ADMIN — Medication 250 MG: at 08:54

## 2019-01-13 RX ADMIN — CITALOPRAM HYDROBROMIDE 20 MG: 20 TABLET ORAL at 08:54

## 2019-01-13 RX ADMIN — VANCOMYCIN HYDROCHLORIDE 125 MG: 500 INJECTION, POWDER, LYOPHILIZED, FOR SOLUTION INTRAVENOUS at 22:37

## 2019-01-13 RX ADMIN — SODIUM BICARBONATE 650 MG TABLET 650 MG: at 08:54

## 2019-01-13 RX ADMIN — VANCOMYCIN HYDROCHLORIDE 125 MG: 500 INJECTION, POWDER, LYOPHILIZED, FOR SOLUTION INTRAVENOUS at 04:30

## 2019-01-13 RX ADMIN — VANCOMYCIN HYDROCHLORIDE 125 MG: 500 INJECTION, POWDER, LYOPHILIZED, FOR SOLUTION INTRAVENOUS at 12:13

## 2019-01-13 NOTE — PROGRESS NOTES
Progress Note - Infectious Disease   Courtney Eubanks 68 y o  female MRN: [id number]  Unit/Bed#: [facility] 609-01 Encounter: [id number]      Impression/Recommendations: This is a 68 y o  female, with multiple medical problems, presented the ER with 2 week history of diarrhea and found to have C difficile colitis  Patient also has left-sided SDH from a recent fall      1  Recurrent C difficile colitis  Patient is clinically well, with benign abdominal exam and no evidence of significant colitis on abdomen/pelvis CT  It would be unusual to see relapsing C difficile colitis after 2 years  More likely would be recurrent C difficile colitis secondary to affective recent systemic antibiotic  However, patient denies any recent antibiotic  Outpatient record review did not reveal any recent antibiotic  If this is truly a recurrent C difficile colitis episode, not precipitated by any recent systemic antibiotic, patient may have significant disruption of her colonic ravi  In which case, she would be at risk for needing fecal transplantation  For now, would treat patient with a somewhat prolonged tapering course of p o  Vancomycin  She is clinically much improved on p o  Vancomycin, with improving diarrhea  Continue p o  vancomycin  Treat with a prolonged tapering course  If patient relapses, she will need fecal transplantation        2  Left-sided SDH, secondary to fall  No evidence of progression  Patient is neurologically intact  Monitor     3  Abnormal UA, and probable asymptomatic bacteriuria  This is likely bladder colonization  No antibiotic needed for this      4  CKD, stage IV  P o  Vancomycin does not need dosage adjustment  Monitor creatinine      Discussed with patient and her family in detail regarding the above plan  Antibiotics:  P o  Vancomycin # 2     Subjective:  Patient is out of ICU  She feels well  No headache  No focal weakness  Diarrhea improved    No abdominal pain     Objective:  Vitals:  Temp:  [97 7 °F (36 5 °C)-98 1 °F (36 7 °C)] 98 1 °F (36 7 °C)  HR:  [58-77] 58  Resp:  [18] 18  BP: (137-149)/(83-85) 137/83  SpO2:  [96 %-100 %] 96 %  Temp (24hrs), Av °F (36 7 °C), Min:97 7 °F (36 5 °C), Max:98 1 °F (36 7 °C)  Current: Temperature: 98 1 °F (36 7 °C)    Physical Exam:     General: Awake, alert, cooperative, no distress  Neck:  Supple  No mass  No lymphadenopathy  Lungs: Expansion symmetric, no rales, no wheezing, respirations unlabored  Heart:  Regular rate and rhythm, S1 and S2 normal, no murmur  Abdomen: Soft, nondistended, non-tender, bowel sounds active all four quadrants,        no masses, no organomegaly  Extremities: No edema  No erythema/warmth  No ulcer  Nontender to palpation  Skin:  No rash  Neuro: Moves all extremities  Invasive Devices     Peripheral Intravenous Line            Peripheral IV 19 Right Antecubital 2 days    Peripheral IV 19 Right Forearm 2 days          Drain            Urostomy Ileal conduit  days                Labs studies:   I have personally reviewed pertinent labs  Results from last 7 days  Lab Units 19  0437 19  0614 19  1242   POTASSIUM mmol/L 3 8 3 6 3 0*   CHLORIDE mmol/L 111* 112* 108   CO2 mmol/L 20* 20* 19*   BUN mg/dL 27* 27* 36*   CREATININE mg/dL 2 58* 2 51* 3 08*   EGFR ml/min/1 73sq m 18 19 14   CALCIUM mg/dL 8 2* 7 6* 8 7   AST U/L  --   --  20   ALT U/L  --   --  11*   ALK PHOS U/L  --   --  94       Results from last 7 days  Lab Units 19  0437 19  0614 19  1242   WBC Thousand/uL 4 81 4 40 5 43   HEMOGLOBIN g/dL 9 5* 9 4* 11 1*   PLATELETS Thousands/uL 299 253 341       Results from last 7 days  Lab Units 19  1522   URINE CULTURE   --  >100,000 cfu/ml    C DIFF TOXIN B  POSITIVE for C difficle toxin by PCR  *  --        Imaging Studies:   I have personally reviewed pertinent imaging study reports and images in PACS     EKG, Pathology, and Other Studies:   I have personally reviewed pertinent reports

## 2019-01-13 NOTE — OCCUPATIONAL THERAPY NOTE
[address]  Evaluation     Patient Name: Andrea POWERSDVERNON Date: 1/13/2019  Problem List  Patient Active Problem List   Diagnosis    Acute kidney injury superimposed on chronic kidney disease ([address] )    Chronic saddle pulmonary embolism ([facility] [address] [facility]    Bladder mass    Small bowel obstruction (HCC)    Obstructive uropathy    Secondary hyperparathyroidism of renal origin ([facility] [address] )    Hypothyroidism    Hypertension    Polycythemia vera ([facility] [address] )    Fall    Subdural hematoma, acute ([facility] [address] )    Intraventricular hemorrhage (HCC)    Diarrhea of presumed infectious origin    Hypokalemia    Recurrent Clostridium difficile diarrhea     Past Medical History  Past Medical History:   Diagnosis Date    Anxiety     Chronic kidney disease (CKD), stage IV (severe) (HCC)     stage IV    Chronic thrombosis of subclavian vein ([facility] [address] )     right    Hydronephrosis     Hypertension     Hypothyroid     Incontinence     Lung mass     Improving on PET/CT 1/2016    Polycythemia vera ([facility] [address] )     Pulmonary embolism ([facility] [address] ) 2014    Urinary tract infection      Past Surgical History  Past Surgical History:   Procedure Laterality Date    BLADDER SUSPENSION      BOTOX INJECTION N/A 7/27/2016    Procedure: BOTOX INJECTION ;  Surgeon: Alvaro Gonzalez MD;  [facility];  Service:     CHOLECYSTECTOMY N/A     COLONOSCOPY      CYSTECTOMY, RADICAL WITH ILEOCONDUIT N/A 10/4/2016    Procedure: Kaylynn Thomas ;  Surgeon: Alvaro Gonzalez MD;  [facility];  Service:    Mazin Stacks W/ RETROGRADES Bilateral 7/27/2016    Procedure: Caitlin Holliday; RETROGRADE PYELOGRAM ;  Surgeon: Alvaro Gonzalez MD;  [facility];  Service:     KS COLONOSCOPY FLX DX W/COLLJ SPEC WHEN PFRMD N/A 8/31/2016    Procedure: COLONOSCOPY;  Surgeon: Lawrence Blum MD;  [facility];   Service: Gastroenterology    KS CYSTOSCOPY,INSERT URETERAL STENT Bilateral 7/27/2016    Procedure: STENT INSERTION; EXCISION OF MESH ;  Surgeon: Felicitas Wheeler MD;  [facility];  Service: Urology    TONSILLECTOMY      TUBAL LIGATION      WISDOM TOOTH EXTRACTION             01/13/19 0840   Note Type   Note type Eval only   Restrictions/Precautions   Weight Bearing Precautions Per Order No   Pain Assessment   Pain Assessment No/denies pain   Home Living   Type of [facility] Two level;Stairs to enter with rails   Prior Function   Level of [place] Independent with ADLs and functional mobility   Lives With Son  (37yo son who has autism)   Receives Help From Family   ADL Assistance Independent   IADLs Independent   Falls in the last 6 months 1 to 4   Vocational Retired   [address] [place] [address] W/O AD - I IADLS    Reciprocal Relationships SUPPORTIVE FAMILY - REPORTS SON HAS AUTISM BUT IS FUNCTIONALLY I    Service to Others RETIRED   Intrinsic Gratification ACTIVE PTA   Subjective   Subjective OFFERS NO C/O    ADL   Eating Assistance 7  Independent   Grooming Assistance 5  Supervision/Setup   UB Bathing Assistance 5  Supervision/Setup   LB Bathing Assistance 5  Supervision/Setup   UB Dressing Assistance 5  Supervision/Setup   LB Dressing Assistance 5  Supervision/Setup   Toileting Assistance  5  Supervision/Setup   Bed Mobility   Supine to Sit 6  Modified independent   Transfers   Sit to Stand 5  Supervision   Stand to Sit 5  Supervision   Stand pivot 5  Supervision   Functional Mobility   Functional Mobility 5  Supervision   Additional items Rolling walker   Balance   Static Sitting Good   Dynamic Sitting Fair +   Static Standing Fair   Dynamic Standing Fair   Ambulatory Fair   Activity Tolerance   Activity Tolerance Patient limited by fatigue   RUE Assessment   RUE Assessment WFL   LUE Assessment   LUE Assessment WFL   Cognition   Overall Cognitive Status WFL   Assessment   Limitation Decreased endurance;Decreased high-level ADLs   Prognosis Good   Assessment Pt is a 68 y o  female who was admitted to [facility] on 1/11/2019 with Subdural hematoma, acute ([address] ) s/p fall -pt reports 2wk h/o diarrhea and weakness r/t same   Pt's problem list also includes PMH of HTN and anxiety, ckd stg iv, chronic thrombosis R subclavian vein, hydronephrosis, hypothyroid, lung mass, PE  At baseline pt was completing adls and mobility independently w/o ad  Pt lives with son who is autistic but functionally I per pt report  Currently pt requires sba for overall ADLS and sba for functional mobility/transfers  Pt currently presents with impairments in the following categories -limited home support and difficulty performing IADLS  endurance and standing balance/tolerance  These impairments, as well as pt's fatigue  limit pt's ability to safely engage in all baseline areas of occupation, includingcommunity mobility, laundry , house maintenance, meal prep, cleaning, social participation  and leisure activities however has support available prn (reports another son is currently staying at her home to assist prn) From OT standpoint, recommend home with family support  upon D/C   No further acute OT needs indicated at this time- Recommend continued oob for meals, ambulation to/from BR, setup for self care tasks and mobility in hallway with nursing/restorative - d/c from caseload with above recommendations   Goals   Patient Goals eat my breakfast   Plan   OT Frequency Eval only   Recommendation   OT Discharge Recommendation Home with family support   OT - OK to Discharge Yes  (when medically cleared)   Barthel Index   Feeding 10   Bathing 0   Grooming Score 5   Dressing Score 5   Bladder Score 10   Bowels Score 10   Toilet Use Score 5   Transfers (Bed/Chair) Score 10   Mobility (Level Surface) Score 10   Stairs Score 0   Barthel Index Score 65     [place], [place]

## 2019-01-13 NOTE — PROGRESS NOTES
Progress Note - Susan Falcon [birth year], 68 y o  female MRN: [id number]    Unit/Bed#: [facility] 609-01 Encounter: [id number]    Primary Care Provider: Ailyn Coughlin MD   Date and time admitted to hospital: 1/11/2019 11:29 AM        Diarrhea of presumed infectious origin   Assessment & Plan    C diff colitis  Appreciate Infectious Disease recommendations  Continue oral vancomycin  Treatment duration per ID     Fall   Assessment & Plan    PT/OT evaluation and treatment  Fall precautions     Polycythemia vera ([facility] [address] )   Assessment & Plan    On chronic anticoagulation at home  Anticoagulation currently being held secondary to intracranial bleed  Possibly resume anticoagulation tomorrow if repeat CT head stable     Hypertension   Assessment & Plan    Patient's blood pressure meds were held prior to admission by her PCP for hypotension  She remains normotensive at this time  Will continue to hold antihypertensives and patient can follow up with PCP on d/c to discuss need for resuming meds  Chronic saddle pulmonary embolism (HCC)   Assessment & Plan    History of polycythemia vera  Chronic anticoagulation currently on hold given intracranial bleed  Neurosurgery plans to repeat CT head tomorrow and resume anticoagulation if stable     Acute kidney injury superimposed on chronic kidney disease ([facility] [address] )   Assessment & Plan    Creatinine stable around 2 5  Patient is making adequate urine  Continue to monitor daily     * Subdural hematoma, acute [facility])   Assessment & Plan    Patient was monitored in the ICU on admission  Repeat CT head on 01/12 stable  Neuro checks  Follow-up repeat CT head tomorrow per Neurosurgery               Subjective/Objective   Chief Complaint:     Subjective:  No acute events overnight  Patient was transferred out of ICU yesterday  Two bowel movements past 24 hours - still watery  Remains afebrile   Denies abdominal pain     Objective:     Meds/Allergies   Prescriptions Prior to Admission Medication Sig Dispense Refill Last Dose    amLODIPine (NORVASC) 10 mg tablet Take 10 mg by mouth daily     Taking    apixaban (ELIQUIS) 2 5 mg Take 1 tablet (2 5 mg total) by mouth 2 (two) times a day 30 tablet 11     calcitriol (ROCALTROL) 0 25 mcg capsule Take 1 capsule (0 25 mcg total) by mouth every other day for 30 days Take 1 tablet 3 times a week (Monday, Wednesday, Friday) 15 capsule 5 Taking    Cholecalciferol (VITAMIN D3) 1000 UNITS CAPS Take 1,000 unit marking on U-100 syringe by mouth daily  Taking    citalopram (CeleXA) 20 mg tablet    Taking    JAKAFI 10 MG tablet TAKE 1 TABLET BY MOUTH ONE TIME DAILY  30 tablet 5 Taking    levothyroxine 75 mcg tablet Take 75 mcg by mouth daily  3 Taking    metoprolol tartrate (LOPRESSOR) 25 mg tablet Take 0 5 tablets by mouth 2 (two) times a day   Taking    saccharomyces boulardii (FLORASTOR) 250 mg capsule Take 1 capsule by mouth daily     Taking    SHINGRIX 50 MCG SUSR TO BE ADMINISTERED BY PHARMACIST FOR IMMUNIZATION  0 Taking    sodium bicarbonate 650 mg tablet Take 1 tablet (650 mg total) by mouth 2 (two) times a day 60 tablet 5 Taking    WELCHOL 625 MG tablet TAKE 1 TABLET AT BEDTIME AT 8PM  2 Taking       Vitals: Blood pressure 139/85, pulse 66, temperature 98 1 °F (36 7 °C), resp  rate 18, height 5' (1 524 m), weight 85 6 kg (188 lb 11 4 oz), SpO2 96 %  Body mass index is 36 86 kg/m²       ABG: No results found for: PHART, SFV5ZSZ, PO2ART, UHQ6OJE, T6MCXQID, BEART, SOURCE      Intake/Output Summary (Last 24 hours) at 01/13/19 5130  Last data filed at 01/13/19 0502   Gross per 24 hour   Intake          1934 17 ml   Output             1525 ml   Net           409 17 ml       Invasive Devices     Peripheral Intravenous Line            Peripheral IV 01/11/19 Right Antecubital 1 day    Peripheral IV 01/11/19 Right Forearm 1 day          Drain            Urostomy Ileal conduit  days                Nutrition/GI Proph/Bowel Reg:  Regular diet    Physical Exam:   GENERAL APPEARANCE:  No acute distress  HEENT:  Normocephalic/atraumatic  CV:  Regular rate and rhythm  LUNGS:  Nonlabored respirations on room air  ABD:  Soft, nontender, nondistended  EXT:  Moves all extremities  NEURO:  GCS 15  SKIN:  Warm and dry    Lab Results:   BMP/CMP:   Lab Results   Component Value Date    SODIUM 142 01/13/2019    K 3 8 01/13/2019     (H) 01/13/2019    CO2 20 (L) 01/13/2019    BUN 27 (H) 01/13/2019    CREATININE 2 58 (H) 01/13/2019    CALCIUM 8 2 (L) 01/13/2019    EGFR 18 01/13/2019   , CBC:   Lab Results   Component Value Date    WBC 4 81 01/13/2019    HGB 9 5 (L) 01/13/2019    HCT 29 2 (L) 01/13/2019    MCV 90 01/13/2019     01/13/2019    MCH 29 3 01/13/2019    MCHC 32 5 01/13/2019    RDW 14 2 01/13/2019    MPV 10 1 01/13/2019    NRBC 0 01/13/2019   , Coagulation: No results found for: PT, INR, APTT, Lactate: No results found for: LACTATE, Amylase: No results found for: AMYLASE, Lipase: No results found for: LIPASE, AST: No results found for: AST, ALT: No results found for: ALT, Urinalysis: No results found for: Naaman Gowers, SPECGRAV, PHUR, LEUKOCYTESUR, NITRITE, PROTEINUA, GLUCOSEU, KETONESU, BILIRUBINUR, BLOODU, CK: No results found for: CKTOTAL, Troponin: No results found for: TROPONINI, EtOH: No results found for: ETOH, UDS: No components found for: RAPIDDRUGSCREEN, Pregnancy: No results found for: PREGTESTUR, ABG: No results found for: PHART, UJE9RIY, PO2ART, JFA6AQG, I2OVCFQE, BEART, SOURCE and ISTAT: No components found for: VBG  Imaging/EKG Studies:   Other Studies:   VTE Prophylaxis:  SCDs

## 2019-01-13 NOTE — ASSESSMENT & PLAN NOTE
C diff colitis, third incident in last 3 years  Appreciate Infectious Disease recommendations  Continue oral vancomycin for minimum of 4 weeks as outpatient  F/u with Dr Kim Bean in 2 weeks     Treatment duration per ID

## 2019-01-13 NOTE — ASSESSMENT & PLAN NOTE
Chronic anticoagulation currently on hold given intracranial bleed, CT head is stable so will resume today  Asymptomatic from PE standpoint

## 2019-01-13 NOTE — ASSESSMENT & PLAN NOTE
Patient's blood pressure meds were held prior to admission by her PCP for hypotension  She remains normotensive at this time  Will continue to hold antihypertensives and patient can follow up with PCP on d/c to discuss need for resuming meds

## 2019-01-13 NOTE — ASSESSMENT & PLAN NOTE
Patient was monitored in the ICU on admission  Repeat CT head on 01/12 stable and again this morning stable  Resume home Eliquis and f/u with neurosurgery as directed with repeat imaging at that time

## 2019-01-13 NOTE — PHYSICAL THERAPY NOTE
Physical Therapy Evaluation    Patient's Name:Erendira Cool    Today's Date:01/13/19    Patient Active Problem List   Diagnosis    Acute kidney injury superimposed on chronic kidney disease ([facility] [address] )    Chronic saddle pulmonary embolism ([facility] [address] )    Bladder mass    Small bowel obstruction (HCC)    Obstructive uropathy    Secondary hyperparathyroidism of renal origin ([facility] [address] )    Hypothyroidism    Hypertension    Polycythemia vera ([facility] [address] )    Fall    Subdural hematoma, acute ([facility] [address] )    Intraventricular hemorrhage (HCC)    Diarrhea of presumed infectious origin    Hypokalemia    Recurrent Clostridium difficile diarrhea       Past Medical History:   Diagnosis Date    Anxiety     Chronic kidney disease (CKD), stage IV (severe) (HCC)     stage IV    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    Hydronephrosis     Hypertension     Hypothyroid     Incontinence     Lung mass     Improving on PET/CT 1/2016    Polycythemia vera ([facility] [address] )     Pulmonary embolism ([facility] [address] ) 2014    Urinary tract infection        Past Surgical History:   Procedure Laterality Date    BLADDER SUSPENSION      BOTOX INJECTION N/A 7/27/2016    Procedure: BOTOX INJECTION ;  Surgeon: Soledad Ly MD;  [facility];  Service:     CHOLECYSTECTOMY N/A     COLONOSCOPY      CYSTECTOMY, RADICAL WITH ILEOCONDUIT N/A 10/4/2016    Procedure: SUPRATRIGONAL CYSTECTOMY WITH ILEAL CONDUIT ;  Surgeon: Soledad Ly MD;  [facility];  Service:    Jerrlyn  W/ RETROGRADES Bilateral 7/27/2016    Procedure: Yas Holtast; RETROGRADE PYELOGRAM ;  Surgeon: Soledad Ly MD;  [facility];  Service:     SC COLONOSCOPY FLX DX W/COLLJ SPEC WHEN PFRMD N/A 8/31/2016    Procedure: COLONOSCOPY;  Surgeon: Ruben Hurt MD;  [facility];   Service: Gastroenterology    SC CYSTOSCOPY,INSERT URETERAL STENT Bilateral 7/27/2016    Procedure: STENT INSERTION; EXCISION OF MESH ;  Surgeon: Soledad Ly MD;  [facility];  Service: Urology    TONSILLECTOMY      TUBAL LIGATION      WISDOM TOOTH EXTRACTION            01/13/19 1100   Pain Assessment   Pain Assessment No/denies pain   Home Living   Type of [address] Multi-level;Stairs to enter with rails   Prior Function   Level of [place] Independent with ADLs and functional mobility   Lives With Son   Receives Help From Family   General   Family/Caregiver Present No   Cognition   Overall Cognitive Status Impaired   Arousal/Participation Alert   Orientation Level Oriented to person;Oriented to place;Oriented to situation   Following Commands Follows multistep commands without difficulty   RUE Assessment   RUE Assessment WFL   LUE Assessment   LUE Assessment WFL   RLE Assessment   RLE Assessment X   Strength RLE   RLE Overall Strength 4-/5   LLE Assessment   LLE Assessment X   Strength LLE   LLE Overall Strength 4-/5   Coordination   Movements are Fluid and Coordinated 0   Coordination and Movement Description walker reliant   Bed Mobility   Additional Comments bed mob NT - pt in chair on PT arrival   Transfers   Sit to Stand 5  Supervision   Stand to Sit 5  Supervision   Stand pivot 5  Supervision   Ambulation/Elevation   Gait pattern Foward flexed; Inconsistent wisam   Gait Assistance 5  Supervision   Additional items Verbal cues   Assistive Device Rolling walker   Distance 100 ft x2   Stair Management Assistance 5  Supervision   Additional items Verbal cues   Stair Management Technique One rail R;Alternating pattern   Number of Stairs 10   Balance   Static Standing Fair +  (RW)   Dynamic Standing Fair  (RW)   Endurance Deficit   Endurance Deficit Yes   Activity Tolerance   Activity Tolerance Patient tolerated treatment well   Medical Staff Made Aware TR   Nurse Made Aware yes   Assessment   Prognosis Good   Assessment pt referred to PT s/p fall w SDH exhibits supervised transf and amb using RW, amb 100 ft x2 - no LOB noted on level surface turns and around obstacles; also supervised w 10 steps ascent descent using rail support and step to pattern; PTA indep w mob lives  w son in multilevel w fam support needed; pt declines home PT at d/c "I dont want anyone coming to my house" appropriate for d/c home w fam support and RW when med cleared   Goals   Patient Goals go home   Recommendation   Recommendation D/C PT;Home with family support   Equipment Recommended Walker   PT - OK to Discharge Yes   Barthel Index   Feeding 10   Bathing 0   Grooming Score 5   Dressing Score 5   Bladder Score 10   Bowels Score 10   Toilet Use Score 5   Transfers (Bed/Chair) Score 10   Mobility (Level Surface) Score 10   Stairs Score 5   Barthel Index Score 70

## 2019-01-14 ENCOUNTER — APPOINTMENT (INPATIENT)
Dept: RADIOLOGY | Facility: HOSPITAL | Age: 74
DRG: 083 | End: 2019-01-14
Payer: COMMERCIAL

## 2019-01-14 VITALS
WEIGHT: 188.71 LBS | BODY MASS INDEX: 37.05 KG/M2 | DIASTOLIC BLOOD PRESSURE: 81 MMHG | HEART RATE: 66 BPM | TEMPERATURE: 98.4 F | HEIGHT: 60 IN | RESPIRATION RATE: 18 BRPM | SYSTOLIC BLOOD PRESSURE: 135 MMHG | OXYGEN SATURATION: 97 %

## 2019-01-14 PROBLEM — E87.6 HYPOKALEMIA: Status: RESOLVED | Noted: 2019-01-11 | Resolved: 2019-01-14

## 2019-01-14 PROCEDURE — 99238 HOSP IP/OBS DSCHRG MGMT 30/<: CPT | Performed by: EMERGENCY MEDICINE

## 2019-01-14 PROCEDURE — 99232 SBSQ HOSP IP/OBS MODERATE 35: CPT | Performed by: NEUROLOGICAL SURGERY

## 2019-01-14 PROCEDURE — 70450 CT HEAD/BRAIN W/O DYE: CPT

## 2019-01-14 PROCEDURE — 99232 SBSQ HOSP IP/OBS MODERATE 35: CPT | Performed by: INTERNAL MEDICINE

## 2019-01-14 RX ORDER — LEVETIRACETAM 500 MG/1
500 TABLET ORAL EVERY 12 HOURS SCHEDULED
Qty: 8 TABLET | Refills: 0 | Status: SHIPPED | OUTPATIENT
Start: 2019-01-14 | End: 2019-01-14

## 2019-01-14 RX ORDER — LEVETIRACETAM 500 MG/1
500 TABLET ORAL EVERY 12 HOURS SCHEDULED
Qty: 8 TABLET | Refills: 0 | Status: SHIPPED | OUTPATIENT
Start: 2019-01-14 | End: 2019-05-16 | Stop reason: CLARIF

## 2019-01-14 RX ORDER — ACETAMINOPHEN 325 MG/1
TABLET ORAL
Qty: 30 TABLET | Refills: 0 | Status: SHIPPED | OUTPATIENT
Start: 2019-01-14

## 2019-01-14 RX ADMIN — VANCOMYCIN HYDROCHLORIDE 125 MG: 500 INJECTION, POWDER, LYOPHILIZED, FOR SOLUTION INTRAVENOUS at 11:48

## 2019-01-14 RX ADMIN — VITAMIN D, TAB 1000IU (100/BT) 1000 UNITS: 25 TAB at 08:08

## 2019-01-14 RX ADMIN — LEVOTHYROXINE SODIUM 75 MCG: 75 TABLET ORAL at 05:25

## 2019-01-14 RX ADMIN — LEVETIRACETAM 500 MG: 500 TABLET, FILM COATED ORAL at 08:08

## 2019-01-14 RX ADMIN — VANCOMYCIN HYDROCHLORIDE 125 MG: 500 INJECTION, POWDER, LYOPHILIZED, FOR SOLUTION INTRAVENOUS at 05:25

## 2019-01-14 RX ADMIN — APIXABAN 2.5 MG: 2.5 TABLET, FILM COATED ORAL at 11:47

## 2019-01-14 RX ADMIN — SODIUM BICARBONATE 650 MG TABLET 650 MG: at 08:08

## 2019-01-14 RX ADMIN — CITALOPRAM HYDROBROMIDE 20 MG: 20 TABLET ORAL at 08:08

## 2019-01-14 RX ADMIN — Medication 250 MG: at 08:08

## 2019-01-14 RX ADMIN — CALCITRIOL CAPSULES 0.25 MCG 0.25 MCG: 0.25 CAPSULE ORAL at 08:08

## 2019-01-14 NOTE — PROGRESS NOTES
Spoke with Maite Ramirez with trauma, plan for today is repeat CT head and possible restart eliquis, continue to monitor pt, no liquid stools today

## 2019-01-14 NOTE — PROGRESS NOTES
Head CT without contrast 01/14/2019:  Trace left tentorial subdural stable  Rounded hypodensity in the occipital horn right lateral ventricle likely small amount of intraventricular hemorrhage also stable  Minimal, chronic microangiopathic stable  Evolving right parietal scalp hematoma without skull fracture  No new intracranial hemorrhage or large tentorial infarction  Patient okay to restart Eliquis given stable CT head  Patient to follow up in the outpatient setting in 2 weeks with repeat CT head  Please call with questions or concerns  Will sign off  Progress Note - Neurosurgery   Yesica Sands 68 y o  female MRN: [id number]  Unit/Bed#: [facility] 609-01 Encounter: [id number]    Assessment:  1  Acute traumatic subdural hemorrhage from a left tentorium - stable  2  Acute traumatic IVH in right occipital horn  3  Hypertension  4  Polycythemia vera  5  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  6  Acute diarrhea  7  History of CDF    Plan:  · Exam:  Nonfocal exam, alert and oriented x3, moving all extremities without focal weakness, reflexes 2+ and symmetrical, no Angel or clonus noted, no drift, JPS 3/3, DST intact  · Imaging reviewed personally and by attending  Results are as follows:  · CT head without contrast 01/12/2018:  Stable subdural hemorrhage around left tentorium  No significant mass effect  Slight interval washout of hemorrhage blood products seen within the occipital horn of the right ventricle  No large delayed intracranial hemorrhage  · CT head without contrast 01/11/2019:  Trace left temporal acute subdural hemorrhage maximum thickness 3 mm extending along the peripheral left tentorium cerebelli  Trace intraventricular hemorrhage dependent right occipital horn  Small right posterior superior parietal scalp contusion/hematoma  No skull fracture  Chronic microangiopathy    · CT head ordered and pending for today in order to assess for stability - if stable okay to restart Eliquis  · Repeat CT head STAT if GCS declines > 2pts/1hour  · Continue Keppra 500mg q12hrs x 1 week for seizure ppx  · Medical management per primary team  · Mobilize as tolerated with assistance  · DVT PPX: SCDs  · Hold off on DVT ppx until repeat CT head shows stability of SDH and IVH  · Neurosurgery will continue to follow in the setting of SDH  · Repeat CT head scheduled for today - if imaging continues to be stable, patient okay to start DVT ppx / Eliquis given history of saddle PE in the past  · If imaging stable, patient to follow up in the outpatient office in about 2 weeks with repeat CT head  · Please call with questions or concerns    Subjective/Objective   Chief Complaint: "I am good " / follow up SDH and IVH    Subjective:  Patient reports no complaints at this time  She reports that her diarrhea is improving  Urinating per baseline  Eating okay  Denies headache, dizziness, chest pain, shortness of breath, abdominal pain, nausea or vomiting, numbness/tingling/weakness, bowel or bladder issues, neck or back pain  Objective: sitting in chair, eating, NAD  I/O       01/12 0701 - 01/13 0700 01/13 0701 - 01/14 0700 01/14 0701 - 01/15 0700    P  O  630 1680     I V  (mL/kg) 1479 2 (17 3)      IV Piggyback 200      Total Intake(mL/kg) 2309 2 (27) 1680 (19 6)     Urine (mL/kg/hr) 2175 (1 1) 925 (0 5)     Total Output 2175 925      Net +134 2 +755             Unmeasured Stool Occurrence 2 x 1 x           Invasive Devices     Peripheral Intravenous Line            Peripheral IV 01/11/19 Right Antecubital 2 days    Peripheral IV 01/11/19 Right Forearm 2 days          Drain            Urostomy Ileal conduit  days                Physical Exam:  Vitals: Blood pressure 135/81, pulse 66, temperature 98 4 °F (36 9 °C), resp  rate 18, height 5' (1 524 m), weight 85 6 kg (188 lb 11 4 oz), SpO2 97 %  ,Body mass index is 36 86 kg/m²      General appearance: alert, appears stated age, cooperative and no distress  Head: Normocephalic, without obvious abnormality, atraumatic  Eyes: EOMI, PERRL  Neck: supple, symmetrical, trachea midline and NT  Back: no kyphosis present, no tenderness to percussion or palpation  Lungs: non labored breathing  Heart: regular heart rate  Neurologic:   Mental status: Alert, oriented x 3, follows commands, able to do simple calculations, able to repeat a sentence, thought content appropriate  Cranial nerves: grossly intact (Cranial nerves II-XII)  Sensory: normal to light touch, JPS 3/3, DST intact  Motor: moving all extremities without focal weakness, strength 5/5 throughout  Reflexes: 2+ and symmetric, no Angel or clonus noted  Coordination: finger to nose normal bilaterally, no drift bilaterally    Lab Results:    Results from last 7 days  Lab Units 01/13/19  0437 01/12/19  0614 01/11/19  1242   WBC Thousand/uL 4 81 4 40 5 43   HEMOGLOBIN g/dL 9 5* 9 4* 11 1*   HEMATOCRIT % 29 2* 28 8* 33 5*   PLATELETS Thousands/uL 299 253 341   MONO PCT % 4 3* 4       Results from last 7 days  Lab Units 01/13/19  0437 01/12/19  0614 01/11/19  1242   POTASSIUM mmol/L 3 8 3 6 3 0*   CHLORIDE mmol/L 111* 112* 108   CO2 mmol/L 20* 20* 19*   BUN mg/dL 27* 27* 36*   CREATININE mg/dL 2 58* 2 51* 3 08*   CALCIUM mg/dL 8 2* 7 6* 8 7   ALK PHOS U/L  --   --  94   ALT U/L  --   --  11*   AST U/L  --   --  20       Results from last 7 days  Lab Units 01/11/19  1242   MAGNESIUM mg/dL 1 8       Results from last 7 days  Lab Units 01/11/19  1242   PHOSPHORUS mg/dL 2 7       Results from last 7 days  Lab Units 01/12/19  0052   INR  1 28*   PTT seconds 37       Imaging Studies: I have personally reviewed pertinent reports  and I have personally reviewed pertinent films in PACS    Ct Abdomen Pelvis Wo Contrast    Result Date: 1/11/2019  Impression: No acute intra-abdominal abnormality  Nonobstructing right renal calculi and marked left renal atrophy again noted   6 9 cm right adnexal cyst, grossly stable in retrospect  This can be evaluated with nonemergent pelvic ultrasound  Stable appearance of post cystectomy and ileal conduit diversion  Limited study without oral or IV contrast  Workstation performed: [id number]     Xr Hip/pelv 2-3 Vws Right If Performed    Result Date: 1/11/2019  Impression: No acute osseous abnormality  Workstation performed: [id number]     Ct Head Wo Contrast    Result Date: 1/12/2019  Impression: Stable subdural hemorrhage along left tentorium  No significant mass effect  Slight interval washout of hemorrhagic blood products seen within the occipital horn of the right ventricle  No large delayed intracranial hemorrhage  Workstation performed: [id number]     Ct Head Wo Contrast    Result Date: 1/11/2019  Impression: Trace left temporal acute subdural hemorrhage maximum thickness 3 mm extending along the peripheral left tentorium cerebelli  Trace intraventricular hemorrhage in the dependent right occipital horn  Small right posterior superior parietal scalp contusion/hematoma  No skull fracture  Chronic microangiopathy  I personally discussed this study with Carly Ruiz on 1/11/2019 at 1:36 PM  Workstation performed: [id number]     EKG, Pathology, and Other Studies: I have personally reviewed pertinent reports        VTE Pharmacologic Prophylaxis: Reason for no pharmacologic prophylaxis - recent SDH, waiting for most recent stable CT head     VTE Mechanical Prophylaxis: sequential compression device

## 2019-01-14 NOTE — PLAN OF CARE
Problem: Potential for Falls  Goal: Patient will remain free of falls  INTERVENTIONS:  - Assess patient frequently for physical needs  -  Identify cognitive and physical deficits and behaviors that affect risk of falls    -  [place] fall precautions as indicated by assessment   - Educate patient/family on patient safety including physical limitations  - Instruct patient to call for assistance with activity based on assessment  - Modify environment to reduce risk of injury  - Consider OT/PT consult to assist with strengthening/mobility   Outcome: Progressing      Problem: Prexisting or High Potential for Compromised Skin Integrity  Goal: Skin integrity is maintained or improved  INTERVENTIONS:  - Identify patients at risk for skin breakdown  - Assess and monitor skin integrity  - Assess and monitor nutrition and hydration status  - Monitor labs (i e  albumin)  - Assess for incontinence   - Turn and reposition patient  - Assist with mobility/ambulation  - Relieve pressure over bony prominences  - Avoid friction and shearing  - Provide appropriate hygiene as needed including keeping skin clean and dry  - Evaluate need for skin moisturizer/barrier cream  - Collaborate with interdisciplinary team (i e  Nutrition, Rehabilitation, etc )   - Patient/family teaching   Outcome: Progressing      Problem: PAIN - ADULT  Goal: Verbalizes/displays adequate comfort level or baseline comfort level  Interventions:  - Encourage patient to monitor pain and request assistance  - Assess pain using appropriate pain scale  - Administer analgesics based on type and severity of pain and evaluate response  - Implement non-pharmacological measures as appropriate and evaluate response  - Consider cultural and social influences on pain and pain management  - Notify physician/advanced practitioner if interventions unsuccessful or patient reports new pain   Outcome: Progressing      Problem: INFECTION - ADULT  Goal: Absence or prevention of progression during hospitalization  INTERVENTIONS:  - Assess and monitor for signs and symptoms of infection  - Monitor lab/diagnostic results  - Monitor all insertion sites, i e  indwelling lines, tubes, and drains  - [place] appropriate cooling/warming therapies per order  - Administer medications as ordered  - Instruct and encourage patient and family to use good hand hygiene technique  - Identify and instruct in appropriate isolation precautions for identified infection/condition    Outcome: Progressing    Goal: Absence of fever/infection during neutropenic period  INTERVENTIONS:  - Monitor WBC  - Implement neutropenic guidelines   Outcome: Progressing      Problem: SAFETY ADULT  Goal: Maintain or return to baseline ADL function  INTERVENTIONS:  -  Assess patient's ability to carry out ADLs; assess patient's baseline for ADL function and identify physical deficits which impact ability to perform ADLs (bathing, care of mouth/teeth, toileting, grooming, dressing, etc )  - Assess/evaluate cause of self-care deficits   - Assess range of motion  - Assess patient's mobility; develop plan if impaired  - Assess patient's need for assistive devices and provide as appropriate  - Encourage maximum independence but intervene and supervise when necessary  ¯ Involve family in performance of ADLs  ¯ Assess for home care needs following discharge   ¯ Request OT consult to assist with ADL evaluation and planning for discharge  ¯ Provide patient education as appropriate   Outcome: Progressing    Goal: Maintain or return mobility status to optimal level  INTERVENTIONS:  - Assess patient's baseline mobility status (ambulation, transfers, stairs, etc )    - Identify cognitive and physical deficits and behaviors that affect mobility  - Identify mobility aids required to assist with transfers and/or ambulation (gait belt, sit-to-stand, lift, walker, cane, etc )  - [place] fall precautions as indicated by assessment  - Record patient progress and toleration of activity level on Mobility SBAR; progress patient to next Phase/Stage  - Instruct patient to call for assistance with activity based on assessment  - Request Rehabilitation consult to assist with strengthening/weightbearing, etc    Outcome: Progressing      Problem: DISCHARGE PLANNING  Goal: Discharge to home or other facility with appropriate resources  INTERVENTIONS:  - Identify barriers to discharge w/patient and caregiver  - Arrange for needed discharge resources and transportation as appropriate  - Identify discharge learning needs (meds, wound care, etc )  - Refer to Case Management Department for coordinating discharge planning if the patient needs post-hospital services based on physician/advanced practitioner order or complex needs related to functional status, cognitive ability, or social support system    Outcome: Progressing      Problem: Knowledge Deficit  Goal: Patient/family/caregiver demonstrates understanding of disease process, treatment plan, medications, and discharge instructions  Complete learning assessment and assess knowledge base  Interventions:  - Provide teaching at level of understanding  - Provide teaching via preferred learning methods   Outcome: Progressing      Problem: Nutrition/Hydration-ADULT  Goal: Nutrient/Hydration intake appropriate for improving, restoring or maintaining nutritional needs  Monitor and assess patient's nutrition/hydration status for malnutrition (ex- brittle hair, bruises, dry skin, pale skin and conjunctiva, muscle wasting, smooth red tongue, and disorientation)  Collaborate with interdisciplinary team and initiate plan and interventions as ordered  Monitor patient's weight and dietary intake as ordered or per policy  Utilize nutrition screening tool and intervene per policy  Determine patient's food preferences and provide high-protein, high-caloric foods as appropriate       INTERVENTIONS:  - Monitor oral intake, urinary output, labs, and treatment plans  - Assess nutrition and hydration status and recommend course of action  - Evaluate amount of meals eaten  - Assist patient with eating if necessary   - Allow adequate time for meals  - Recommend/ encourage appropriate diets, oral nutritional supplements, and vitamin/mineral supplements  - Order, calculate, and assess calorie counts as needed  - Recommend, monitor, and adjust tube feedings and TPN/PPN based on assessed needs  - Assess need for intravenous fluids  - Provide specific nutrition/hydration education as appropriate  - Include patient/family/caregiver in decisions related to nutrition   Outcome: Progressing      Problem: DISCHARGE PLANNING - CARE MANAGEMENT  Goal: Discharge to post-acute care or home with appropriate resources  INTERVENTIONS:  - Conduct assessment to determine patient/family and health care team treatment goals, and need for post-acute services based on payer coverage, community resources, and patient preferences, and barriers to discharge  - Address psychosocial, clinical, and financial barriers to discharge as identified in assessment in conjunction with the patient/family and health care team  - Arrange appropriate level of post-acute services according to patient's   needs and preference and payer coverage in collaboration with the physician and health care team  - Communicate with and update the patient/family, physician, and health care team regarding progress on the discharge plan  - Arrange appropriate transportation to post-acute venues  - will follow for medically necessary resources   Outcome: Progressing

## 2019-01-14 NOTE — INCIDENTAL FINDINGS
The following findings require follow up:  Radiographic finding   Finding:    Nonobstructing right renal calculi and marked left renal atrophy again noted      6 9 cm right adnexal cyst, grossly stable in retrospect   This can be evaluated with nonemergent pelvic ultrasound   Follow up required: family doctor   Follow up should be done within 2 week(s)

## 2019-01-14 NOTE — SOCIAL WORK
Cm reviewed patient during care coordination rounds  Patient is not medically stable with for discharge at this time  Cm continues to follow for final discharge recommendations and medical clearance  Cm later informed patient is medically stable for discharge  Cm met with patient, stated she has rolling walker at home  Patient has ride in room  No other needs identified at time of discharge  CM reviewed d/c planning process including the following: identifying help at home, patient preference for d/c planning needs, Discharge Lounge, [facility] Meds to Bed program, availability of treatment team to discuss questions or concerns patient and/or family may have regarding understanding medications and recognizing signs and symptoms once discharged  CM also encouraged patient to follow up with all recommended appointments after discharge  Patient advised of importance for patient and family to participate in managing patients medical well being

## 2019-01-14 NOTE — PLAN OF CARE
Problem: Potential for Falls  Goal: Patient will remain free of falls  INTERVENTIONS:  - Assess patient frequently for physical needs  -  Identify cognitive and physical deficits and behaviors that affect risk of falls    -  [place] fall precautions as indicated by assessment   - Educate patient/family on patient safety including physical limitations  - Instruct patient to call for assistance with activity based on assessment  - Modify environment to reduce risk of injury  - Consider OT/PT consult to assist with strengthening/mobility   Outcome: Adequate for Discharge      Problem: Prexisting or High Potential for Compromised Skin Integrity  Goal: Skin integrity is maintained or improved  INTERVENTIONS:  - Identify patients at risk for skin breakdown  - Assess and monitor skin integrity  - Assess and monitor nutrition and hydration status  - Monitor labs (i e  albumin)  - Assess for incontinence   - Turn and reposition patient  - Assist with mobility/ambulation  - Relieve pressure over bony prominences  - Avoid friction and shearing  - Provide appropriate hygiene as needed including keeping skin clean and dry  - Evaluate need for skin moisturizer/barrier cream  - Collaborate with interdisciplinary team (i e  Nutrition, Rehabilitation, etc )   - Patient/family teaching   Outcome: Adequate for Discharge      Problem: PAIN - ADULT  Goal: Verbalizes/displays adequate comfort level or baseline comfort level  Interventions:  - Encourage patient to monitor pain and request assistance  - Assess pain using appropriate pain scale  - Administer analgesics based on type and severity of pain and evaluate response  - Implement non-pharmacological measures as appropriate and evaluate response  - Consider cultural and social influences on pain and pain management  - Notify physician/advanced practitioner if interventions unsuccessful or patient reports new pain   Outcome: Adequate for Discharge      Problem: INFECTION - ADULT  Goal: Absence or prevention of progression during hospitalization  INTERVENTIONS:  - Assess and monitor for signs and symptoms of infection  - Monitor lab/diagnostic results  - Monitor all insertion sites, i e  indwelling lines, tubes, and drains  - [place] appropriate cooling/warming therapies per order  - Administer medications as ordered  - Instruct and encourage patient and family to use good hand hygiene technique  - Identify and instruct in appropriate isolation precautions for identified infection/condition    Outcome: Adequate for Discharge    Goal: Absence of fever/infection during neutropenic period  INTERVENTIONS:  - Monitor WBC  - Implement neutropenic guidelines   Outcome: Adequate for Discharge      Problem: SAFETY ADULT  Goal: Maintain or return to baseline ADL function  INTERVENTIONS:  -  Assess patient's ability to carry out ADLs; assess patient's baseline for ADL function and identify physical deficits which impact ability to perform ADLs (bathing, care of mouth/teeth, toileting, grooming, dressing, etc )  - Assess/evaluate cause of self-care deficits   - Assess range of motion  - Assess patient's mobility; develop plan if impaired  - Assess patient's need for assistive devices and provide as appropriate  - Encourage maximum independence but intervene and supervise when necessary  ¯ Involve family in performance of ADLs  ¯ Assess for home care needs following discharge   ¯ Request OT consult to assist with ADL evaluation and planning for discharge  ¯ Provide patient education as appropriate   Outcome: Adequate for Discharge    Goal: Maintain or return mobility status to optimal level  INTERVENTIONS:  - Assess patient's baseline mobility status (ambulation, transfers, stairs, etc )    - Identify cognitive and physical deficits and behaviors that affect mobility  - Identify mobility aids required to assist with transfers and/or ambulation (gait belt, sit-to-stand, lift, walker, cane, etc )  - [place] fall precautions as indicated by assessment  - Record patient progress and toleration of activity level on Mobility SBAR; progress patient to next Phase/Stage  - Instruct patient to call for assistance with activity based on assessment  - Request Rehabilitation consult to assist with strengthening/weightbearing, etc    Outcome: Adequate for Discharge      Problem: DISCHARGE PLANNING  Goal: Discharge to home or other facility with appropriate resources  INTERVENTIONS:  - Identify barriers to discharge w/patient and caregiver  - Arrange for needed discharge resources and transportation as appropriate  - Identify discharge learning needs (meds, wound care, etc )  - Refer to Case Management Department for coordinating discharge planning if the patient needs post-hospital services based on physician/advanced practitioner order or complex needs related to functional status, cognitive ability, or social support system    Outcome: Adequate for Discharge      Problem: Knowledge Deficit  Goal: Patient/family/caregiver demonstrates understanding of disease process, treatment plan, medications, and discharge instructions  Complete learning assessment and assess knowledge base  Interventions:  - Provide teaching at level of understanding  - Provide teaching via preferred learning methods   Outcome: Adequate for Discharge      Problem: Nutrition/Hydration-ADULT  Goal: Nutrient/Hydration intake appropriate for improving, restoring or maintaining nutritional needs  Monitor and assess patient's nutrition/hydration status for malnutrition (ex- brittle hair, bruises, dry skin, pale skin and conjunctiva, muscle wasting, smooth red tongue, and disorientation)  Collaborate with interdisciplinary team and initiate plan and interventions as ordered  Monitor patient's weight and dietary intake as ordered or per policy  Utilize nutrition screening tool and intervene per policy   Determine patient's food preferences and provide high-protein, high-caloric foods as appropriate       INTERVENTIONS:  - Monitor oral intake, urinary output, labs, and treatment plans  - Assess nutrition and hydration status and recommend course of action  - Evaluate amount of meals eaten  - Assist patient with eating if necessary   - Allow adequate time for meals  - Recommend/ encourage appropriate diets, oral nutritional supplements, and vitamin/mineral supplements  - Order, calculate, and assess calorie counts as needed  - Recommend, monitor, and adjust tube feedings and TPN/PPN based on assessed needs  - Assess need for intravenous fluids  - Provide specific nutrition/hydration education as appropriate  - Include patient/family/caregiver in decisions related to nutrition   Outcome: Adequate for Discharge      Problem: DISCHARGE PLANNING - CARE MANAGEMENT  Goal: Discharge to post-acute care or home with appropriate resources  INTERVENTIONS:  - Conduct assessment to determine patient/family and health care team treatment goals, and need for post-acute services based on payer coverage, community resources, and patient preferences, and barriers to discharge  - Address psychosocial, clinical, and financial barriers to discharge as identified in assessment in conjunction with the patient/family and health care team  - Arrange appropriate level of post-acute services according to patient's   needs and preference and payer coverage in collaboration with the physician and health care team  - Communicate with and update the patient/family, physician, and health care team regarding progress on the discharge plan  - Arrange appropriate transportation to post-acute venues  - will follow for medically necessary resources   Outcome: Adequate for Discharge

## 2019-01-14 NOTE — DISCHARGE SUMMARY
Discharge- Abilio Faria [birth year], 68 y o  female MRN: [id number]    Unit/Bed#: [facility] 609-01 Encounter: [id number]    Primary Care Provider: Tam Jara MD   Date and time admitted to hospital: 1/11/2019 11:29 AM    67 y/o female s/p fall    Fall   Assessment & Plan    PT/OT recommends discharge home  Fall was due to dehydration from large volume diarrhea       * Subdural hematoma, acute [facility])   Assessment & Plan    Patient was monitored in the ICU on admission  Repeat CT head on 01/12 stable and again this morning stable  Resume home Eliquis and f/u with neurosurgery as directed with repeat imaging at that time     Intraventricular hemorrhage [facility])   Assessment & Plan    Same as above     Diarrhea of presumed infectious origin   Assessment & Plan    C diff colitis, third incident in last 3 years  Appreciate Infectious Disease recommendations  Continue oral vancomycin for minimum of 4 weeks as outpatient  F/u with Dr Muna Ramirez in 2 weeks  Treatment duration per ID     Chronic saddle pulmonary embolism (HCC)   Assessment & Plan    Chronic anticoagulation currently on hold given intracranial bleed, CT head is stable so will resume today  Asymptomatic from PE standpoint     Acute kidney injury superimposed on chronic kidney disease ([facility] [address] )   Assessment & Plan    Creatinine stable at baseline around 2 5  Patient is making adequate urine       Hypertension   Assessment & Plan    Patient's blood pressure meds were held prior to admission by her PCP for hypotension  She remains normotensive at this time  Will continue to hold antihypertensives and patient can follow up with PCP on d/c to discuss need for resuming meds  Hypothyroidism   Assessment & Plan    On home synthroid     Polycythemia vera ([facility] [address] )   Assessment & Plan    Chronic condition         Proph: Resume Eliquis today, SCDs,   Disp: D/C home today    Bedside nurse rounds completed with nurse Cata Roland  Patient aware of plan of care for the day  Resolved Problems  Date Reviewed: 1/14/2019          Resolved    Hypokalemia 1/14/2019     Resolved by  Bishop Greta PA-C          Admission Date:   Admission Orders     Ordered        01/11/19 1455  Inpatient Admission  Once               Admitting Diagnosis: Diarrhea [R19 7]  Syncope [R55]  Subdural hematoma ([facility] [address] ) [S06 5X9A]  SDH (subdural hematoma) ([facility] [address] ) [S06 5X9A]  LUANN (acute kidney injury) ([facility] [address] ) [N17 9]    HPI: As per Silvana Diaz who evaluated the patient on admission, "Gracie Esquivel is a 68 y o  female who presents with SDH after a fall on Sunday  She reports she felt dizzy then appears to have syncopized, waking up on the floor presumably striking her head on the coffee table  She elicits a small amount of bleeding from her head but otherwise felt ok  She reports leading up to this fall she has had diarrhea for the past 2 weeks  She denies any abdominal pain or cramping and denies any fevers  She denies any sick contacts and reports no one in her family has become ill, just herself  She reports her stools are loose and "all water it seems like " She reports she has had C  Diff in the past but states this is different than those experiences  She was instructed to try imodium by her PCP which helped slow down the frequency of diarrhea but gave her increased gas and bloating pains  She presented to the ER today for work-up of her diarrhea at which point her scalp abrasion was noted and a head CT was ordered given that she remains on eliquis  She denies any symptoms related to the SDH such as headache, dizziness, weakness, gait changes or cognitive changes   She denies any nausea or vomiting but admits she is likely dehydrated as she has not been eating and drinking much because " it all just goes right through me " She denies ever passing out before and has not had any further dizzy, presyncope or syncope symptoms since Sunday      Her past medical history is significant for chronic saddle PE, history of multiple DVT's and PEs, polycythemia Vera for which she remains on eliquis  She also has a history of ileal conduit for "i couldn't control my bladder anymore", SBO in OCt, C  Diff in the past, HTN, hypothyroidism, and CKD with baseline creat in the high 2's  She denies any MI, cardiac stents, arrhythmias, or diabetes  "    Procedures Performed:   Orders Placed This Encounter   Procedures    ED ECG Documentation Only       Summary of Hospital Course: Patient was tested for C  Diff which was found to be positive  She was seen by ID and and recommended for prolonged tapering course of PO vancomycin  Her diarrhea had improved and at time of d/c was having soft formed stools  She was seen by neurosurgery and Eliquis was held due to SDH  Her f/u CT scan on 1/12 and 1/14 were both stable and she was cleared to resume Eliquis due to her saddle PE on 1/14  She will f/u with neurosurgery with repeat CT head  She was eating and drinking well and cleared PT/OT for discharge home  Today she has no complaints and is feeling well  Ready for discharge home  Exam:   GEN: NAD  HEENT: NCAT  NEURO: GCS 15, non-focal exam  CV: RRR no MGR  PULM: CTA bilaterally  GI: soft, non-tender, non-distended  : voididng  MSK: moving all equally  SKIN: pink, warm, dry      Significant Findings, Care, Treatment and Services Provided:   Ct Abdomen Pelvis Wo Contrast    Result Date: 1/11/2019  Impression: No acute intra-abdominal abnormality  Nonobstructing right renal calculi and marked left renal atrophy again noted  6 9 cm right adnexal cyst, grossly stable in retrospect  This can be evaluated with nonemergent pelvic ultrasound  Stable appearance of post cystectomy and ileal conduit diversion  Limited study without oral or IV contrast  Workstation performed: [id number]     Xr Hip/pelv 2-3 Vws Right If Performed    Result Date: 1/11/2019  Impression: No acute osseous abnormality   Workstation performed: [id number]     Ct Head Wo Contrast    Result Date: 1/14/2019  Impression: 1  Trace left tentorial subdural is stable  2   Rounded hypodensity in the occipital horn right lateral ventricle likely small amount of intraventricular hemorrhage also stable  Follow-up evaluation in 4-6 weeks recommended to evaluate for resolution as differential diagnosis could include mass lesion although this seems less likely given the adjacent scalp trauma and subdural  3   Minimal, chronic microangiopathy stable  4   Evolving right parietal scalp hematoma without skull fracture  5   No new intracranial hemorrhage or large territorial infarction  Workstation performed: [id number]     Ct Head Wo Contrast    Result Date: 1/12/2019  Impression: Stable subdural hemorrhage along left tentorium  No significant mass effect  Slight interval washout of hemorrhagic blood products seen within the occipital horn of the right ventricle  No large delayed intracranial hemorrhage  Workstation performed: [id number]     Ct Head Wo Contrast    Result Date: 1/11/2019  Impression: Trace left temporal acute subdural hemorrhage maximum thickness 3 mm extending along the peripheral left tentorium cerebelli  Trace intraventricular hemorrhage in the dependent right occipital horn  Small right posterior superior parietal scalp contusion/hematoma  No skull fracture  Chronic microangiopathy  I personally discussed this study with Tamara Scott on 1/11/2019 at 1:36 PM  Workstation performed: [id number]       Complications: none    Condition at Discharge: good         Discharge instructions/Information to patient and family:   See after visit summary for information provided to patient and family  Provisions for Follow-Up Care:  See after visit summary for information related to follow-up care and any pertinent home health orders        PCP: Galen Hawkins MD    Disposition: Home    Planned Readmission: No    Discharge Statement   I spent 30 minutes discharging the patient  This time was spent on the day of discharge  I had direct contact with the patient on the day of discharge  Additional documentation is required if more than 30 minutes were spent on discharge  Discharge Medications:  See after visit summary for reconciled discharge medications provided to patient and family

## 2019-01-14 NOTE — PROGRESS NOTES
Progress Note - Infectious Disease   Tretha Cogan 68 y o  female MRN: [id number]  Unit/Bed#: [facility] 609-01 Encounter: [id number]      Impression/Recommendations:  1  Recurrent C difficile colitis   Patient is clinically well, with benign abdominal exam and no evidence of significant colitis on abdomen/pelvis CT   It would be unusual to see relapsing C difficile colitis after 2 years  Vitor Johnson likely would be recurrent C difficile colitis secondary to affective recent systemic antibiotic  Hu , patient denies any recent antibiotic   Outpatient record review did not reveal any recent antibiotic   If this is truly a recurrent C difficile colitis episode, not precipitated by any recent systemic antibiotic, patient may have significant disruption of her colonic ravi   In which case, she would be at risk for needing fecal transplantation   For now, would treat patient with a somewhat prolonged tapering course of p o  Vancomycin  She is clinically much improved on p o  Vancomycin, with improving diarrhea and stool more formed  Continue p o  vancomycin  Treat with a prolonged tapering course  If patient relapses, she will need fecal transplantation     For now, patient stays on p o  Vancomycin q i d  Dosing  She can follow with me as outpatient  We will start the tapering process as an outpatient      2  Left-sided SDH, secondary to fall   No evidence of progression   Patient is neurologically intact  Monitor     3  Abnormal UA, and probable asymptomatic bacteriuria   This is likely bladder colonization  No antibiotic needed for this      4  CKD, stage IV  P o  Vancomycin does not need dosage adjustment  Monitor creatinine      Discussed with patient in detail regarding the above plan  Discussed with surgery service  Okay for discharge from ID viewpoint  Follow-up with me 2 weeks after discharge      Antibiotics:  P o  Vancomycin # 3     Subjective:  Patient feels well  No headache    No focal weakness  Diarrhea improved  Stool formed  No abdominal pain  Objective:  Vitals:  Temp:  [98 1 °F (36 7 °C)-98 4 °F (36 9 °C)] 98 4 °F (36 9 °C)  HR:  [66-78] 66  Resp:  [18] 18  BP: (123-138)/(77-82) 135/81  SpO2:  [96 %-98 %] 97 %  Temp (24hrs), Av 3 °F (36 8 °C), Min:98 1 °F (36 7 °C), Max:98 4 °F (36 9 °C)  Current: Temperature: 98 4 °F (36 9 °C)    Physical Exam:     General: Awake, alert, cooperative, no distress  Neck:  Supple  No mass  No lymphadenopathy  Lungs: Expansion symmetric, no rales, no wheezing, respirations unlabored  Heart:  Regular rate and rhythm, S1 and S2 normal, no murmur  Abdomen: Soft, nondistended, non-tender, bowel sounds active all four quadrants,        no masses, no organomegaly  Extremities: No edema  No erythema/warmth  No ulcer  Nontender to palpation  Skin:  No rash  Neuro: Moves all extremities  Invasive Devices     Peripheral Intravenous Line            Peripheral IV 19 Right Antecubital 2 days    Peripheral IV 19 Right Forearm 2 days          Drain            Urostomy Ileal conduit  days                Labs studies:   I have personally reviewed pertinent labs  Results from last 7 days  Lab Units 19  0437 19  0614 19  1242   POTASSIUM mmol/L 3 8 3 6 3 0*   CHLORIDE mmol/L 111* 112* 108   CO2 mmol/L 20* 20* 19*   BUN mg/dL 27* 27* 36*   CREATININE mg/dL 2 58* 2 51* 3 08*   EGFR ml/min/1 73sq m 18 19 14   CALCIUM mg/dL 8 2* 7 6* 8 7   AST U/L  --   --  20   ALT U/L  --   --  11*   ALK PHOS U/L  --   --  94       Results from last 7 days  Lab Units 19  0437 19  0614 19  1242   WBC Thousand/uL 4 81 4 40 5 43   HEMOGLOBIN g/dL 9 5* 9 4* 11 1*   PLATELETS Thousands/uL 299 253 341       Results from last 7 days  Lab Units 19  1522   URINE CULTURE   --  >100,000 cfu/ml    C DIFF TOXIN B  POSITIVE for C difficle toxin by PCR  *  --        Imaging Studies:   I have personally reviewed pertinent imaging study reports and images in PACS  EKG, Pathology, and Other Studies:   I have personally reviewed pertinent reports

## 2019-01-14 NOTE — DISCHARGE INSTRUCTIONS
Continue home Eliquis as directed  Follow up with Neurosurgery as directed  Continue Vancomycin at current dose  Follow up with Dr Isai Eaton (Infectious Disease) as an outpatient in 2 weeks  Seek medical attn if you develop worsening headaches, dizziness, N/V, numbness/weakness/tingling  Discharge Instructions - Neurosurgery    · Do not take any blood thinning medications (ie  No Advil  No motrin  No ibuprofen  No Aleve  No Aspirin  No fishoil  No heparin  No antiplatelet / no anticoagulation medication)  - OKAY TO TAKE ELIQUIS  · Refrain from activity that increases chance of trauma to head or falls  Recommend you take fall precaution  · No strenuous activity or sports  · Return to hospital Emergency Room if you experience worsening / new headache, nausea/vomiting, speech/vision change, seizure, confusion / mental status change, weakness, or other neurological changes  Please follow up in 2 weeks with the Neurosurgical group with repeat CT head without contrast 2-3 days prior to your appointment  You may go to any [facility] facility to get your imaging done  Please call [phone] to schedule your imaging appointment

## 2019-01-15 ENCOUNTER — TRANSCRIBE ORDERS (OUTPATIENT)
Dept: ADMINISTRATIVE | Facility: HOSPITAL | Age: 74
End: 2019-01-15

## 2019-01-15 ENCOUNTER — TELEPHONE (OUTPATIENT)
Dept: NEUROSURGERY | Facility: CLINIC | Age: 74
End: 2019-01-15

## 2019-01-15 NOTE — TELEPHONE ENCOUNTER
01/15/2019-PT SCHEDULED CT ON 01/28/2019     01/15/2019-LESVIA (01/11/2019)SIGN OFF FOLLOW-UP AT OUTPATIENT CLINIC  CALLED PT AND CONFIRMED 01/29/2019 APT  PT WILL SCHEDULE CT HEAD

## 2019-01-15 NOTE — UTILIZATION REVIEW
Notification of Discharge  This is a Notification of Discharge from our facility [address]  Please be advised that this patient has been discharge from our facility  Below you will find the admission and discharge date and time including the patients disposition  PRESENTATION DATE: 1/11/2019 11:29 AM  IP ADMISSION DATE: 1/11/19 1455  DISCHARGE DATE: 1/14/2019  1:55 PM  DISPOSITION: [id number] [facility] Utilization Review Department  Phone: [phone] Mark Silverman; Fax [phone]     Send all requests for admission clinical reviews, approved or denied determinations and any other requests to fax [phone]   All voicemails are confidential

## 2019-01-16 ENCOUNTER — TELEPHONE (OUTPATIENT)
Dept: UROLOGY | Facility: AMBULATORY SURGERY CENTER | Age: 74
End: 2019-01-16

## 2019-01-16 DIAGNOSIS — N99.528 COMPLICATION OF ILEAL CONDUIT (HCC): Primary | ICD-10-CM

## 2019-01-16 LAB — WBC SPEC QL GRAM STN: NORMAL

## 2019-01-16 NOTE — TELEPHONE ENCOUNTER
Patient said her urostomy bags have been leaking and she is down to her last one  She is not sure what to do   Please call her back

## 2019-01-16 NOTE — TELEPHONE ENCOUNTER
I returned call to patient and had gotten [facility]'s number  I had called [facility] and they stated that they were shipped out and she should be receiving them tomorrow  Patient is aware

## 2019-01-17 DIAGNOSIS — N18.4 CKD (CHRONIC KIDNEY DISEASE), STAGE IV (HCC): ICD-10-CM

## 2019-01-17 RX ORDER — SODIUM BICARBONATE 650 MG/1
650 TABLET ORAL 2 TIMES DAILY
Qty: 60 TABLET | Refills: 5 | Status: SHIPPED | OUTPATIENT
Start: 2019-01-17 | End: 2019-08-11 | Stop reason: SDUPTHER

## 2019-01-28 ENCOUNTER — APPOINTMENT (OUTPATIENT)
Dept: LAB | Age: 74
End: 2019-01-28
Payer: COMMERCIAL

## 2019-01-28 ENCOUNTER — HOSPITAL ENCOUNTER (OUTPATIENT)
Dept: RADIOLOGY | Age: 74
Discharge: HOME | End: 2019-01-28
Payer: COMMERCIAL

## 2019-01-28 DIAGNOSIS — S06.5X9A SUBDURAL HEMATOMA, ACUTE (HCC): ICD-10-CM

## 2019-01-28 DIAGNOSIS — N18.4 CKD (CHRONIC KIDNEY DISEASE) STAGE 4, GFR 15-29 ML/MIN (HCC): Chronic | ICD-10-CM

## 2019-01-28 DIAGNOSIS — I61.5 INTRAVENTRICULAR HEMORRHAGE (HCC): ICD-10-CM

## 2019-01-28 LAB
ALBUMIN SERPL BCP-MCNC: 3.5 G/DL (ref 3.5–5)
ANION GAP SERPL CALCULATED.3IONS-SCNC: 8 MMOL/L (ref 4–13)
BUN SERPL-MCNC: 39 MG/DL (ref 5–25)
CALCIUM SERPL-MCNC: 8.6 MG/DL (ref 8.3–10.1)
CHLORIDE SERPL-SCNC: 108 MMOL/L (ref 100–108)
CO2 SERPL-SCNC: 20 MMOL/L (ref 21–32)
CREAT SERPL-MCNC: 2.93 MG/DL (ref 0.6–1.3)
CREAT UR-MCNC: 85.6 MG/DL
ERYTHROCYTE [DISTWIDTH] IN BLOOD BY AUTOMATED COUNT: 14.5 % (ref 11.6–15.1)
GFR SERPL CREATININE-BSD FRML MDRD: 15 ML/MIN/1.73SQ M
GLUCOSE SERPL-MCNC: 95 MG/DL (ref 65–140)
HCT VFR BLD AUTO: 35.8 % (ref 34.8–46.1)
HGB BLD-MCNC: 11.3 G/DL (ref 11.5–15.4)
MCH RBC QN AUTO: 29 PG (ref 26.8–34.3)
MCHC RBC AUTO-ENTMCNC: 31.6 G/DL (ref 31.4–37.4)
MCV RBC AUTO: 92 FL (ref 82–98)
PHOSPHATE SERPL-MCNC: 4.4 MG/DL (ref 2.3–4.1)
PLATELET # BLD AUTO: 412 THOUSANDS/UL (ref 149–390)
PMV BLD AUTO: 10.7 FL (ref 8.9–12.7)
POTASSIUM SERPL-SCNC: 4.3 MMOL/L (ref 3.5–5.3)
PROT UR-MCNC: 72 MG/DL
PROT/CREAT UR: 0.84 MG/G{CREAT} (ref 0–0.1)
RBC # BLD AUTO: 3.89 MILLION/UL (ref 3.81–5.12)
SODIUM SERPL-SCNC: 136 MMOL/L (ref 136–145)
WBC # BLD AUTO: 5.85 THOUSAND/UL (ref 4.31–10.16)

## 2019-01-28 PROCEDURE — 80069 RENAL FUNCTION PANEL: CPT

## 2019-01-28 PROCEDURE — 85027 COMPLETE CBC AUTOMATED: CPT

## 2019-01-28 PROCEDURE — 84156 ASSAY OF PROTEIN URINE: CPT

## 2019-01-28 PROCEDURE — 70450 CT HEAD/BRAIN W/O DYE: CPT

## 2019-01-28 PROCEDURE — 36415 COLL VENOUS BLD VENIPUNCTURE: CPT

## 2019-01-28 PROCEDURE — 82570 ASSAY OF URINE CREATININE: CPT

## 2019-01-28 NOTE — TELEPHONE ENCOUNTER
Patient is managed by Dr Nika Wright and s/p cystectomy with ileal conduit 10/4/16  Spoke with patient  She is not having a difficult time getting the catheters from [address] [place]  She is having a hard time with the bags leaking and not attaching well to stoma  For example, today, patient has had a difficult time and needed to change her bag 3x  This is not typical, but this does happen enough that patient would like to see what else can be done  Advised patient will check with provider to see if she would need an appt here or if she needs referral to wound care  Please advise

## 2019-01-28 NOTE — TELEPHONE ENCOUNTER
Spoke with patient and informed her referral to wound care was placed to assist with her issues of bag leaking and not adhering properly  Provided patient with phone number to wound care also  Patient verbalized understanding

## 2019-01-29 ENCOUNTER — OFFICE VISIT (OUTPATIENT)
Dept: NEUROSURGERY | Facility: CLINIC | Age: 74
End: 2019-01-29
Payer: COMMERCIAL

## 2019-01-29 VITALS
RESPIRATION RATE: 16 BRPM | HEART RATE: 74 BPM | TEMPERATURE: 98.1 F | HEIGHT: 60 IN | DIASTOLIC BLOOD PRESSURE: 88 MMHG | SYSTOLIC BLOOD PRESSURE: 130 MMHG | WEIGHT: 187 LBS | BODY MASS INDEX: 36.71 KG/M2

## 2019-01-29 DIAGNOSIS — R93.0 ABNORMAL CT OF THE HEAD: Primary | ICD-10-CM

## 2019-01-29 DIAGNOSIS — S06.5X9A SUBDURAL HEMATOMA, ACUTE (HCC): ICD-10-CM

## 2019-01-29 PROCEDURE — 99213 OFFICE O/P EST LOW 20 MIN: CPT | Performed by: PHYSICIAN ASSISTANT

## 2019-01-29 NOTE — PATIENT INSTRUCTIONS
Proceed for MRI brain without contrast as discussed      Return to Neurosurgery after MRI, Dr Sánchez/Neel(SNPX)

## 2019-01-29 NOTE — LETTER
January 29, 2019     Mariana Ross MD  [address] [id number] [facility]  [place] [address]    Patient: Lauren Mcarthur   YOB: 1945   Date of Visit: 1/29/2019       Dear Dr Doreen Pagan: Thank you for referring Jair Ivey to me for evaluation  Below are my notes for this consultation  If you have questions, please do not hesitate to call me  I look forward to following your patient along with you  Sincerely,        Gt Ruggiero PA-C        CC: No Recipients  Gt Ruggiero PA-C  1/29/2019 11:59 AM  Sign at close encounter  Assessment/Plan:    Very pleasant 71-year-old female, returns for hospital follow-up, approximately 2 weeks  She has a history of a fall with a subdural hematoma  She has had updated CT head as requested 1/14/19  She offers no complaints today, denies headache, gait or balance disturbance, motor or sensory difficulties in the upper lower extremities, bowel or bladder incontinence (she has had a total cystectomy and has an ileal conduit)  CT head 1/14/19 is carefully reviewed in detail by Dori Whitaker, and compared with prior studies 1/14/19, 1/12/19, and 1/11/19 the prior reported left subdural hematoma is resolved  She does remain with a right occipital horn hyperdensity, which again reviewed remains unchanged from prior studies  She has no prior history of imaging of the brain, CT or MRI other than these studies  On exam today; there is no pronator drift, she is awake alert and oriented, motor examination the upper lower extremities is 5 x 5 for power, reflexes are intact, cranial nerves are grossly intact  Further follow-up is planned with MRI of the brain to evaluate the right occipital horn lesion/density  As she has stage IV renal failure, with a GFR of 18 contrast study is not requested  Further follow-up is planned after imaging for further recommendations      These findings, impressions and recommendations are reviewed in great detail with the patient, she expressed understanding and agreement, her questions were answered completely and to her satisfaction  Follow up has been scheduled  Diagnoses and all orders for this visit:    Abnormal CT of the head  -     MRI brain without contrast; Future    Subdural hematoma, acute ([address] )  -     MRI brain without contrast; Future          Return in about 3 weeks (around 2/19/2019)  Subjective:      Patient ID: Omari Campos is a 68 y o  female  Very pleasant 52-year-old female, returns for 2 week hospital follow-up, history of fall  She has a history of cystectomy and ileal conduit for persistent urinary incontinence  She continues on Eliquis for coli disorder history of DVT and pulmonary embolism  The following portions of the patient's history were reviewed and updated as appropriate: allergies, current medications, past family history, past medical history, past social history and past surgical history  Review of Systems   Constitutional: Negative  HENT: Negative  Eyes: Negative  Respiratory: Negative  Cardiovascular: Negative  Gastrointestinal: Negative  Endocrine: Negative  Genitourinary: Negative  Musculoskeletal: Negative  Skin: Negative  Allergic/Immunologic: Negative  Neurological: Negative  Hematological: Bruises/bleeds easily (On Eliquis)  Psychiatric/Behavioral: Negative  Objective:    Physical Exam   Constitutional: She is oriented to person, place, and time  She appears well-developed and well-nourished  Eyes: Pupils are equal, round, and reactive to light  EOM are normal    Neck: Normal range of motion  Cardiovascular: Normal rate, regular rhythm and normal heart sounds  Pulmonary/Chest: Effort normal and breath sounds normal    Musculoskeletal: Normal range of motion  Neurological: She is alert and oriented to person, place, and time  Skin: Skin is warm and dry         Neurologic Exam     Mental Status Oriented to person, place, and time  Cranial Nerves     CN III, IV, VI   Pupils are equal, round, and reactive to light  Extraocular motions are normal         CT BRAIN - WITHOUT CONTRAST   1/28/19     INDICATION:   S06  5X9A: Traumatic subdural hemorrhage with loss of consciousness of unspecified duration, initial encounter  I61 5: Nontraumatic intracerebral hemorrhage, intraventricular      COMPARISON:  CT head 1/14/2019 and 1/11/2019     TECHNIQUE:  CT examination of the brain was performed  In addition to axial images, coronal 2D reformatted images were created and submitted for interpretation        Radiation dose length product (DLP) for this visit:  929 mGy-cm   This examination, like all CT scans performed in the [facility], was performed utilizing techniques to minimize radiation dose exposure, including the use of iterative   reconstruction and automated exposure control        IMAGE QUALITY:  Diagnostic      FINDINGS:     PARENCHYMA:  No intracranial mass, mass effect or midline shift  No CT signs of acute infarction  No acute parenchymal hemorrhage  Periventricular and centrum semiovale white matter hypodensity is a nonspecific finding likely representing chronic   microangiopathy  Calcification bilateral cavernous internal carotid arteries      VENTRICLES AND EXTRA-AXIAL SPACES:  Small hyperdensity in the right occipital horn without significant interval change since January 11, 2019  Sliver of residual left tentorial subdural hemorrhage maximum thickness 2 mm image 83 series 400      No hydrocephalus      VISUALIZED ORBITS AND PARANASAL SINUSES:  Unremarkable      CALVARIUM AND EXTRACRANIAL SOFT TISSUES:  Minimal residual right parietal scalp soft tissue swelling/contusion    Otherwise unremarkable      IMPRESSION:     Progressive almost complete resolution of left tentorial subdural hemorrhage with residual sliver of tentorial hyperdensity      Stable right occipital horn hyperdensity without significant change in size, shape, and density since January 11, 2019  Consider follow-up with dedicated brain MRI with and without contrast if it would alter patient management  Otherwise follow-up CT   in 4 weeks is advised      Minimal residual right parietal scalp soft tissue swelling/contusion      Chronic microangiopathy

## 2019-01-29 NOTE — PROGRESS NOTES
Assessment/Plan:    Very pleasant 81-year-old female, returns for hospital follow-up, approximately 2 weeks  She has a history of a fall with a subdural hematoma  She has had updated CT head as requested 1/14/19  She offers no complaints today, denies headache, gait or balance disturbance, motor or sensory difficulties in the upper lower extremities, bowel or bladder incontinence (she has had a total cystectomy and has an ileal conduit)  CT head 1/14/19 is carefully reviewed in detail by Matias Banks, and compared with prior studies 1/14/19, 1/12/19, and 1/11/19 the prior reported left subdural hematoma is resolved  She does remain with a right occipital horn hyperdensity, which again reviewed remains unchanged from prior studies  She has no prior history of imaging of the brain, CT or MRI other than these studies  On exam today; there is no pronator drift, she is awake alert and oriented, motor examination the upper lower extremities is 5 x 5 for power, reflexes are intact, cranial nerves are grossly intact  Further follow-up is planned with MRI of the brain to evaluate the right occipital horn lesion/density  As she has stage IV renal failure, with a GFR of 18 contrast study is not requested  Further follow-up is planned after imaging for further recommendations  These findings, impressions and recommendations are reviewed in great detail with the patient, she expressed understanding and agreement, her questions were answered completely and to her satisfaction  Follow up has been scheduled  Diagnoses and all orders for this visit:    Abnormal CT of the head  -     MRI brain without contrast; Future    Subdural hematoma, acute ([address] )  -     MRI brain without contrast; Future          Return in about 3 weeks (around 2/19/2019)  Subjective:      Patient ID: Toni Amen is a 68 y o  female      Very pleasant 81-year-old female, returns for 2 week hospital follow-up, history of fall  She has a history of cystectomy and ileal conduit for persistent urinary incontinence  She continues on Eliquis for coli disorder history of DVT and pulmonary embolism  The following portions of the patient's history were reviewed and updated as appropriate: allergies, current medications, past family history, past medical history, past social history and past surgical history  Review of Systems   Constitutional: Negative  HENT: Negative  Eyes: Negative  Respiratory: Negative  Cardiovascular: Negative  Gastrointestinal: Negative  Endocrine: Negative  Genitourinary: Negative  Musculoskeletal: Negative  Skin: Negative  Allergic/Immunologic: Negative  Neurological: Negative  Hematological: Bruises/bleeds easily (On Eliquis)  Psychiatric/Behavioral: Negative  Objective:    Physical Exam   Constitutional: She is oriented to person, place, and time  She appears well-developed and well-nourished  Eyes: Pupils are equal, round, and reactive to light  EOM are normal    Neck: Normal range of motion  Cardiovascular: Normal rate, regular rhythm and normal heart sounds  Pulmonary/Chest: Effort normal and breath sounds normal    Musculoskeletal: Normal range of motion  Neurological: She is alert and oriented to person, place, and time  Skin: Skin is warm and dry  Neurologic Exam     Mental Status   Oriented to person, place, and time  Cranial Nerves     CN III, IV, VI   Pupils are equal, round, and reactive to light  Extraocular motions are normal         CT BRAIN - WITHOUT CONTRAST   1/28/19     INDICATION:   S06  5X9A: Traumatic subdural hemorrhage with loss of consciousness of unspecified duration, initial encounter  I61 5: Nontraumatic intracerebral hemorrhage, intraventricular      COMPARISON:  CT head 1/14/2019 and 1/11/2019     TECHNIQUE:  CT examination of the brain was performed    In addition to axial images, coronal 2D reformatted images were created and submitted for interpretation        Radiation dose length product (DLP) for this visit:  929 mGy-cm   This examination, like all CT scans performed in the [facility], was performed utilizing techniques to minimize radiation dose exposure, including the use of iterative   reconstruction and automated exposure control        IMAGE QUALITY:  Diagnostic      FINDINGS:     PARENCHYMA:  No intracranial mass, mass effect or midline shift  No CT signs of acute infarction  No acute parenchymal hemorrhage  Periventricular and centrum semiovale white matter hypodensity is a nonspecific finding likely representing chronic   microangiopathy  Calcification bilateral cavernous internal carotid arteries      VENTRICLES AND EXTRA-AXIAL SPACES:  Small hyperdensity in the right occipital horn without significant interval change since January 11, 2019  Sliver of residual left tentorial subdural hemorrhage maximum thickness 2 mm image 83 series 400      No hydrocephalus      VISUALIZED ORBITS AND PARANASAL SINUSES:  Unremarkable      CALVARIUM AND EXTRACRANIAL SOFT TISSUES:  Minimal residual right parietal scalp soft tissue swelling/contusion  Otherwise unremarkable      IMPRESSION:     Progressive almost complete resolution of left tentorial subdural hemorrhage with residual sliver of tentorial hyperdensity      Stable right occipital horn hyperdensity without significant change in size, shape, and density since January 11, 2019  Consider follow-up with dedicated brain MRI with and without contrast if it would alter patient management  Otherwise follow-up CT   in 4 weeks is advised      Minimal residual right parietal scalp soft tissue swelling/contusion      Chronic microangiopathy

## 2019-01-31 ENCOUNTER — TELEPHONE (OUTPATIENT)
Dept: NEPHROLOGY | Facility: CLINIC | Age: 74
End: 2019-01-31

## 2019-01-31 ENCOUNTER — OFFICE VISIT (OUTPATIENT)
Dept: INFECTIOUS DISEASES | Facility: CLINIC | Age: 74
End: 2019-01-31
Payer: COMMERCIAL

## 2019-01-31 VITALS
RESPIRATION RATE: 16 BRPM | BODY MASS INDEX: 37.15 KG/M2 | HEIGHT: 60 IN | SYSTOLIC BLOOD PRESSURE: 135 MMHG | TEMPERATURE: 97.1 F | WEIGHT: 189.2 LBS | DIASTOLIC BLOOD PRESSURE: 75 MMHG | HEART RATE: 62 BPM

## 2019-01-31 DIAGNOSIS — N18.4 CKD (CHRONIC KIDNEY DISEASE) STAGE 4, GFR 15-29 ML/MIN (HCC): ICD-10-CM

## 2019-01-31 DIAGNOSIS — A04.71 RECURRENT CLOSTRIDIUM DIFFICILE DIARRHEA: Primary | ICD-10-CM

## 2019-01-31 DIAGNOSIS — R82.71 ASYMPTOMATIC BACTERIURIA: ICD-10-CM

## 2019-01-31 DIAGNOSIS — S06.5X9A SUBDURAL HEMATOMA, ACUTE (HCC): ICD-10-CM

## 2019-01-31 PROCEDURE — 99214 OFFICE O/P EST MOD 30 MIN: CPT | Performed by: INTERNAL MEDICINE

## 2019-01-31 NOTE — PROGRESS NOTES
Progress Note - Infectious Disease   Zoya Wong 68 y o  female MRN: [id number]  Unit/Bed#:  Encounter: [id number]      Impression/Recommendations:  1  Recurrent C difficile colitis   Patient is clinically well, with benign abdominal exam and no evidence of significant colitis on abdomen/pelvis CT  Diarrhea has resolved  At this point, I will have her start vancomycin taper  If she relapses with a prolonged tapering course, she would then be a candidate for fecal transplant  Continue p o  vancomycin  Taper to 125 mg b i d  X 2 weeks  Then, taper to 125 mg daily x 4 weeks  Then stop      2  Left-sided SDH, secondary to fall   No evidence of progression   Patient is neurologically intact  Monitor     3  Asymptomatic bacteriuria   This is likely bladder colonization  No antibiotic needed for this      4  CKD, stage IV  P o  Vancomycin does not need dosage adjustment  Monitor creatinine      Discussed with patient in detail regarding the above plan  Patient to call me if she has recurrent diarrhea  Otherwise, follow-up with me p r n        Antibiotics:  P o  Vancomycin # 19    Subjective:  Patient was hospitalized at [facility] this month with recurrent C difficile colitis and SDH following a fall  She did well with conservative care  Diarrhea resolved with p o  Vancomycin  She remains well as an outpatient  No further diarrhea  No abdominal pain  No urinary symptoms  The following portions of the patient's history were reviewed and updated as appropriate: allergies, current medications, past medical history, past social history, past surgical history and problem list     Objective:  Vitals:  Temperature: (!) 97 1 °F (36 2 °C)    Physical Exam:     General: Awake, alert, cooperative, no distress  Neck:  Supple  No lymphadenopathy  No mass  Lungs: Expansion symmetric, no rales, no wheezing, respirations unlabored     Heart:  Regular rate and rhythm, S1 and S2 normal, no murmur  Abdomen: Soft, nondistended, non-tender, bowel sounds active all four quadrants,        no masses, no organomegaly  Extremities: No edema  No erythema/warmth  No ulcer  Nontender to palpation  Skin:  No rash  Neuro: Moves all extremities  Invasive Devices     Drain            Urostomy Ileal conduit  days                Labs studies:   I have personally reviewed pertinent labs  Results from last 7 days  Lab Units 01/28/19  0931   POTASSIUM mmol/L 4 3   CHLORIDE mmol/L 108   CO2 mmol/L 20*   BUN mg/dL 39*   CREATININE mg/dL 2 93*   EGFR ml/min/1 73sq m 15   CALCIUM mg/dL 8 6       Results from last 7 days  Lab Units 01/28/19  0931   WBC Thousand/uL 5 85   HEMOGLOBIN g/dL 11 3*   HEMATOCRIT % 35 8   PLATELETS Thousands/uL 412*           Imaging Studies:   I have personally reviewed pertinent imaging study reports and images in PACS  EKG, Pathology, and Other Studies:   I have personally reviewed pertinent reports

## 2019-01-31 NOTE — PATIENT INSTRUCTIONS
Follow up with us as needed  Continue vancomycin orally  Taper to 125 mg twice a day X 2 weeks  Then, taper to 125 mg daily x 4 weeks  Then stop

## 2019-02-01 ENCOUNTER — OFFICE VISIT (OUTPATIENT)
Dept: NEPHROLOGY | Facility: CLINIC | Age: 74
End: 2019-02-01
Payer: COMMERCIAL

## 2019-02-01 VITALS
SYSTOLIC BLOOD PRESSURE: 144 MMHG | HEIGHT: 60 IN | DIASTOLIC BLOOD PRESSURE: 82 MMHG | HEART RATE: 70 BPM | BODY MASS INDEX: 37.22 KG/M2 | WEIGHT: 189.6 LBS

## 2019-02-01 DIAGNOSIS — N13.9 OBSTRUCTIVE UROPATHY: ICD-10-CM

## 2019-02-01 DIAGNOSIS — N18.4 STAGE 4 CHRONIC KIDNEY DISEASE (HCC): Primary | ICD-10-CM

## 2019-02-01 DIAGNOSIS — I10 ESSENTIAL HYPERTENSION: ICD-10-CM

## 2019-02-01 DIAGNOSIS — N25.81 SECONDARY HYPERPARATHYROIDISM OF RENAL ORIGIN (HCC): ICD-10-CM

## 2019-02-01 DIAGNOSIS — A04.71 RECURRENT CLOSTRIDIUM DIFFICILE DIARRHEA: ICD-10-CM

## 2019-02-01 PROCEDURE — 99214 OFFICE O/P EST MOD 30 MIN: CPT | Performed by: INTERNAL MEDICINE

## 2019-02-01 NOTE — PATIENT INSTRUCTIONS
I would like to see you back in the office in 3 months with repeat labs  Avoid NSAIDs (ibuprofen, Motrin, Advil, Aleve, naproxen)  Okay to take Tylenol or paracetamol or acetaminophen as needed pain  If you develop any decreased appetite, changes in you tast, nausea vomiting, fluid retention, or any acute changes, please go directly to the emergency department for urgent evaluation  Keep saving nondominant arm (left arm), no intravenously lines or blood draws over the left arm, to states that arm in anticipation of dialysis access in the near future

## 2019-02-01 NOTE — PROGRESS NOTES
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 [facility] )    Recurrent Clostridium difficile diarrhea    Obstructive uropathy        This is a 23-year-old lady with known history of chronic kidney disease stage 4 with baseline creatinine in the mid to high 2s, history of urinary incontinence, bilateral hydronephrosis secondary to obstructive uropathy and 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 in October 2016 who returns to the office for follow-up  1  Chronic kidney disease stage 4, his creatinine in the mid to high 2s  Most recent creatinine at 0 9 with an estimated GFR of 15  Currently Delano does not have any uremic symptoms, her appetite and taste are unchanged  I would like to see her back in the office in 3 months with repeat labs  Once again discussed about the symptoms to look for suspected worsening kidney function, she will contact me if she develops any of those symptoms  Avoid NSAIDs  Keep saving the nondominant arm (left arm ) in anticipation of dialysis access in the future  No intravenously lines or blood draws over the left arm  She went to [employer] classes in the past       2  Hypertension, blood pressure currently well controlled, no changes on her medications  3  History of nonfunctional bladder causing bilateral hydronephrosis status post supra trigonal cystectomy and ileal conduit, continue follow-up with urologist Dr Samantha Conklin  4  Polycythemia area, previous history of PE, continues his with Hematology follow-up, Dr Nayeli Snell       5  Mineral bone disease, noted PTH stable, continue with Calcitrol 1 tablet 3 times a week (Monday, Wednesday, Friday) follow labs in 3 months  6  Recurrent C diff colitis, on vancomycin as per ID  Diarrhea seems to be improving  7  Left-sided subdural hematoma status post fall, continue with follow-up with Neurosurgery  Patient Instructions   I would like to see you back in the office in 3 months with repeat labs  Avoid NSAIDs (ibuprofen, Motrin, Advil, Aleve, naproxen)  Okay to take Tylenol or paracetamol or acetaminophen as needed pain  If you develop any decreased appetite, changes in you tast, nausea vomiting, fluid retention, or any acute changes, please go directly to the emergency department for urgent evaluation  Keep saving nondominant arm (left arm), no intravenously lines or blood draws over the left arm, to states that arm in anticipation of dialysis access in the near future  HPI: Yesica Sands is a 68 y o  female who is here for Follow-up    Last time seen in our office was in October 2018, today she returns to the office for 4 months follow-up  Since our last visit, she was hospitalized in October 2018 due to a small bowel obstruction that resolved spontaneously  She was hospitalized early January 2019 status post fall, found to have a left-sided subdural hematoma, creatinine on admission 3 0 that decreased to 2 5 after intravenously fluids  Today returns to the office for 4 month follow-up, in general feeling better, diarrhea seems to be improving  She saw infection Disease doctor yesterday and continue with vancomycin p o  She is currently following by Neurosurgery for subdural hematoma  Patient currently has no complaints at this time and is feeling well  Patient denies any chest pain, shortness of breath and swelling  The last blood work was done on 01/28/2019, which we have reviewed together  Appetite been stable, energy level in generally unchanged  Denies any NSAID use  ROS:   All the systems were reviewed and were negative except as documented on the HPI  Allergies: Chlorhexidine    Medications:   Current Outpatient Prescriptions:     acetaminophen (TYLENOL) 325 mg tablet, 650 mg every 6 hours as needed for mild pain or headache, Disp: 30 tablet, Rfl: 0    apixaban (ELIQUIS) 2 5 mg, Take 1 tablet (2 5 mg total) by mouth 2 (two) times a day, Disp: 30 tablet, Rfl: 11    Cholecalciferol (VITAMIN D3) 1000 UNITS CAPS, Take 1,000 unit marking on U-100 syringe by mouth daily  , Disp: , Rfl:     citalopram (CeleXA) 20 mg tablet, , Disp: , Rfl:     JAKAFI 10 MG tablet, TAKE 1 TABLET BY MOUTH ONE TIME DAILY  , Disp: 30 tablet, Rfl: 5    levothyroxine 75 mcg tablet, Take 75 mcg by mouth daily, Disp: , Rfl: 3    metoprolol tartrate (LOPRESSOR) 25 mg tablet, Take 0 5 tablets by mouth 2 (two) times a day, Disp: , Rfl:     saccharomyces boulardii (FLORASTOR) 250 mg capsule, Take 1 capsule by mouth daily  , Disp: , Rfl:     sodium bicarbonate 650 mg tablet, Take 1 tablet (650 mg total) by mouth 2 (two) times a day, Disp: 60 tablet, Rfl: 5    vancomycin (VANCOCIN) 50mg/mL SOLN, Take 2 5 mL (125 mg total) by mouth every 6 (six) hours (Patient taking differently: Take 125 mg by mouth 2 (two) times a day  ), Disp: 300 mL, Rfl: 0    WELCHOL 625 MG tablet, TAKE 1 TABLET AT BEDTIME AT 8PM, Disp: , Rfl: 2    calcitriol (ROCALTROL) 0 25 mcg capsule, Take 1 capsule (0 25 mcg total) by mouth every other day for 30 days Take 1 tablet 3 times a week (Monday, Wednesday, Friday), Disp: 15 capsule, Rfl: 5    levETIRAcetam (KEPPRA) 500 mg tablet, Take 1 tablet (500 mg total) by mouth every 12 (twelve) hours for 8 doses (Patient not taking: Reported on 1/31/2019 ), Disp: 8 tablet, Rfl: 0    Past Medical History:   Diagnosis Date    Anxiety     Chronic kidney disease (CKD), stage IV (severe) (HCC)     stage IV    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    Hydronephrosis     Hypertension     Hypothyroid     Incontinence     Lung mass     Improving on PET/CT 1/2016    Polycythemia vera ([facility] )     Pulmonary embolism ([facility] ) 2014    Urinary tract infection      Past Surgical History:   Procedure Laterality Date    BLADDER SUSPENSION      BOTOX INJECTION N/A 7/27/2016    Procedure: BOTOX INJECTION ;  Surgeon: German Sher MD;  [facility];  Service:     CHOLECYSTECTOMY N/A     COLONOSCOPY      CYSTECTOMY, RADICAL WITH ILEOCONDUIT N/A 10/4/2016    Procedure: Rian Cote WITH ILEAL CONDUIT ;  Surgeon: German Sher MD;  [facility];  Service:    Lisa Claudio W/ RETROGRADES Bilateral 7/27/2016    Procedure: Wilhemina Gin ;  Surgeon: German Sher MD;  [facility];  Service:     AR COLONOSCOPY FLX DX W/COLLJ SPEC WHEN PFRMD N/A 8/31/2016    Procedure: COLONOSCOPY;  Surgeon: Lora Griffith MD;  Location: [facility]; Service: Gastroenterology    AR CYSTOSCOPY,INSERT URETERAL STENT Bilateral 7/27/2016    Procedure: STENT INSERTION; EXCISION OF MESH ;  Surgeon: German Sher MD;  [facility];  Service: Urology    TONSILLECTOMY      TUBAL LIGATION      WISDOM TOOTH EXTRACTION       Family History   Problem Relation Age of Onset    Cancer Mother         small cell cancer     Heart disease Father     COPD Father     Heart disease Brother     Nephrolithiasis Brother       reports that she has never smoked  She has never used smokeless tobacco  She reports that she does not drink alcohol or use drugs  Physical Exam:   Vitals:    02/01/19 1121 02/01/19 1136   BP:  144/82   BP Location:  Right arm   Patient Position:  Sitting   Pulse:  70   Weight: 86 kg (189 lb 9 6 oz)    Height: 5' (1 524 m)      Body mass index is 37 03 kg/m²      General: cooperative, in not acute distress  Eyes: conjunctivae pink, anicteric sclerae  ENT: lips and mucous membranes moist  Neck: supple, no JVD  Chest: clear breath sounds bilateral, no crackles, ronchus or wheezings  CVS: distinct S1 & S2, normal rate, regular rhythm  Abdomen: obese, soft, non-tender, non-distended, normoactive bowel sounds, ileo conduit over Right lower quadrant  Extremities: minimal tTrace edema of both legs  Skin: no rash  Neuro: awake, alert, oriented      Lab Results:  Results for orders placed or performed in visit on 01/28/19   CBC   Result Value Ref Range    WBC 5 85 4 31 - 10 16 Thousand/uL    RBC 3 89 3 81 - 5 12 Million/uL    Hemoglobin 11 3 (L) 11 5 - 15 4 g/dL    Hematocrit 35 8 34 8 - 46 1 %    MCV 92 82 - 98 fL    MCH 29 0 26 8 - 34 3 pg    MCHC 31 6 31 4 - 37 4 g/dL    RDW 14 5 11 6 - 15 1 %    Platelets 759 (H) 436 - 390 Thousands/uL    MPV 10 7 8 9 - 12 7 fL   Renal function panel   Result Value Ref Range    Albumin 3 5 3 5 - 5 0 g/dL    Calcium 8 6 8 3 - 10 1 mg/dL    Phosphorus 4 4 (H) 2 3 - 4 1 mg/dL    Glucose 95 65 - 140 mg/dL    BUN 39 (H) 5 - 25 mg/dL    Creatinine 2 93 (H) 0 60 - 1 30 mg/dL    Sodium 136 136 - 145 mmol/L    Potassium 4 3 3 5 - 5 3 mmol/L    Chloride 108 100 - 108 mmol/L    CO2 20 (L) 21 - 32 mmol/L    ANION GAP 8 4 - 13 mmol/L    eGFR 15 ml/min/1 73sq m   Protein / creatinine ratio, urine   Result Value Ref Range    Creatinine, Ur 85 6 mg/dL    Protein Urine Random 72 mg/dL    Prot/Creat Ratio, Ur 0 84 (H) 0 00 - 0 10         Portions of the record may have been created with voice recognition software  Occasional wrong word or "sound a like" substitutions may have occurred due to the inherent limitations of voice recognition software  Read the chart carefully and recognize, using context, where substitutions have occurred  If you have any questions, please contact the dictating provider

## 2019-02-01 NOTE — LETTER
February 1, 2019     Bryon Aragon MD  [address] [place] [address]  [address] [id number]    Patient: Marciano Saenz   YOB: 1945   Date of Visit: 2/1/2019       Dear Dr Hany Olivarez: Thank you for referring Colby Knott to me for evaluation  Below are my notes for this consultation  If you have questions, please do not hesitate to call me  I look forward to following your patient along with you  Sincerely,        Severiano Civil, MD        CC: No Recipients  Severiano Civil, MD  2/1/2019 11:46 AM  Sign at close encounter  OFFICE FOLLOW UP - Nephrology   Marciano Saenz 68 y o  female MRN: [id number]       ASSESSMENT and PLAN:  Subha Villegas was seen today for follow-up  Diagnoses and all orders for this visit:    Stage 4 chronic kidney disease ([facility] )  -     CBC; Future  -     Protein / creatinine ratio, urine; Future  -     Renal function panel; Future  -     PTH, intact; Future    Essential hypertension    Secondary hyperparathyroidism of renal origin ([facility] [address] )    Recurrent Clostridium difficile diarrhea    Obstructive uropathy        This is a 71-year-old lady with known history of chronic kidney disease stage 4 with baseline creatinine in the mid to high 2s, history of urinary incontinence, bilateral hydronephrosis secondary to obstructive uropathy and 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 in October 2016 who returns to the office for follow-up  1  Chronic kidney disease stage 4, his creatinine in the mid to high 2s  Most recent creatinine at 0 9 with an estimated GFR of 15  Currently Delano does not have any uremic symptoms, her appetite and taste are unchanged  I would like to see her back in the office in 3 months with repeat labs  Once again discussed about the symptoms to look for suspected worsening kidney function, she will contact me if she develops any of those symptoms  Avoid NSAIDs      Keep saving the nondominant arm (left arm ) in anticipation of dialysis access in the future  No intravenously lines or blood draws over the left arm  She went to [facility] [employer] classes in the past       2  Hypertension, blood pressure currently well controlled, no changes on her medications  3  History of nonfunctional bladder causing bilateral hydronephrosis status post supra trigonal cystectomy and ileal conduit, continue follow-up with urologist Dr Daniel Alves  4  Polycythemia area, previous history of PE, continues his with Hematology follow-up, Dr Nasrin Carmen  5  Mineral bone disease, noted PTH stable, continue with Calcitrol 1 tablet 3 times a week (Monday, Wednesday, Friday) follow labs in 3 months  6  Recurrent C diff colitis, on vancomycin as per ID  Diarrhea seems to be improving  7  Left-sided subdural hematoma status post fall, continue with follow-up with Neurosurgery  Patient Instructions   I would like to see you back in the office in 3 months with repeat labs  Avoid NSAIDs (ibuprofen, Motrin, Advil, Aleve, naproxen)  Okay to take Tylenol or paracetamol or acetaminophen as needed pain  If you develop any decreased appetite, changes in you tast, nausea vomiting, fluid retention, or any acute changes, please go directly to the emergency department for urgent evaluation  Keep saving nondominant arm (left arm), no intravenously lines or blood draws over the left arm, to states that arm in anticipation of dialysis access in the near future  HPI: Tretha Cogan is a 68 y o  female who is here for Follow-up    Last time seen in our office was in October 2018, today she returns to the office for 4 months follow-up  Since our last visit, she was hospitalized in October 2018 due to a small bowel obstruction that resolved spontaneously    She was hospitalized early January 2019 status post fall, found to have a left-sided subdural hematoma, creatinine on admission 3 0 that decreased to 2 5 after intravenously fluids  Today returns to the office for 4 month follow-up, in general feeling better, diarrhea seems to be improving  She saw infection Disease doctor yesterday and continue with vancomycin p o  She is currently following by Neurosurgery for subdural hematoma  Patient currently has no complaints at this time and is feeling well  Patient denies any chest pain, shortness of breath and swelling  The last blood work was done on 01/28/2019, which we have reviewed together  Appetite been stable, energy level in generally unchanged  Denies any NSAID use  ROS:   All the systems were reviewed and were negative except as documented on the HPI  Allergies: Chlorhexidine    Medications:   Current Outpatient Prescriptions:     acetaminophen (TYLENOL) 325 mg tablet, 650 mg every 6 hours as needed for mild pain or headache, Disp: 30 tablet, Rfl: 0    apixaban (ELIQUIS) 2 5 mg, Take 1 tablet (2 5 mg total) by mouth 2 (two) times a day, Disp: 30 tablet, Rfl: 11    Cholecalciferol (VITAMIN D3) 1000 UNITS CAPS, Take 1,000 unit marking on U-100 syringe by mouth daily  , Disp: , Rfl:     citalopram (CeleXA) 20 mg tablet, , Disp: , Rfl:     JAKAFI 10 MG tablet, TAKE 1 TABLET BY MOUTH ONE TIME DAILY  , Disp: 30 tablet, Rfl: 5    levothyroxine 75 mcg tablet, Take 75 mcg by mouth daily, Disp: , Rfl: 3    metoprolol tartrate (LOPRESSOR) 25 mg tablet, Take 0 5 tablets by mouth 2 (two) times a day, Disp: , Rfl:     saccharomyces boulardii (FLORASTOR) 250 mg capsule, Take 1 capsule by mouth daily  , Disp: , Rfl:     sodium bicarbonate 650 mg tablet, Take 1 tablet (650 mg total) by mouth 2 (two) times a day, Disp: 60 tablet, Rfl: 5    vancomycin (VANCOCIN) 50mg/mL SOLN, Take 2 5 mL (125 mg total) by mouth every 6 (six) hours (Patient taking differently: Take 125 mg by mouth 2 (two) times a day  ), Disp: 300 mL, Rfl: 0    WELCHOL 625 MG tablet, TAKE 1 TABLET AT BEDTIME AT 8PM, Disp: , Rfl: 2   calcitriol (ROCALTROL) 0 25 mcg capsule, Take 1 capsule (0 25 mcg total) by mouth every other day for 30 days Take 1 tablet 3 times a week (Monday, Wednesday, Friday), Disp: 15 capsule, Rfl: 5    levETIRAcetam (KEPPRA) 500 mg tablet, Take 1 tablet (500 mg total) by mouth every 12 (twelve) hours for 8 doses (Patient not taking: Reported on 1/31/2019 ), Disp: 8 tablet, Rfl: 0    Past Medical History:   Diagnosis Date    Anxiety     Chronic kidney disease (CKD), stage IV (severe) (HCC)     stage IV    Chronic thrombosis of subclavian vein ([facility] [address] )     right    Hydronephrosis     Hypertension     Hypothyroid     Incontinence     Lung mass     Improving on PET/CT 1/2016    Polycythemia vera ([facility] [address] )     Pulmonary embolism ([facility] [address] ) 2014    Urinary tract infection      Past Surgical History:   Procedure Laterality Date    BLADDER SUSPENSION      BOTOX INJECTION N/A 7/27/2016    Procedure: BOTOX INJECTION ;  Surgeon: German Sher MD;  [facility];  Service:     CHOLECYSTECTOMY N/A     COLONOSCOPY      CYSTECTOMY, RADICAL WITH ILEOCONDUIT N/A 10/4/2016    Procedure: SUPRATRIGONAL CYSTECTOMY WITH ILEAL CONDUIT ;  Surgeon: German Sher MD;  [facility];  Service:    Lisa Claudio W/ RETROGRADES Bilateral 7/27/2016    Procedure: [facility]; RETROGRADE PYELOGRAM ;  Surgeon: German Sher MD;  [facility];  Service:     NC COLONOSCOPY FLX DX W/COLLJ SPEC WHEN PFRMD N/A 8/31/2016    Procedure: COLONOSCOPY;  Surgeon: Lora Griffith MD;  Location: [facility];   Service: Gastroenterology    NC CYSTOSCOPY,INSERT URETERAL STENT Bilateral 7/27/2016    Procedure: STENT INSERTION; EXCISION OF MESH ;  Surgeon: German Sher MD;  [facility];  Service: Urology    TONSILLECTOMY      TUBAL LIGATION      WISDOM TOOTH EXTRACTION       Family History   Problem Relation Age of Onset    Cancer Mother         small cell cancer     Heart disease Father     COPD Father     Heart disease Brother  Nephrolithiasis Brother       reports that she has never smoked  She has never used smokeless tobacco  She reports that she does not drink alcohol or use drugs  Physical Exam:   Vitals:    02/01/19 1121 02/01/19 1136   BP:  144/82   BP Location:  Right arm   Patient Position:  Sitting   Pulse:  70   Weight: 86 kg (189 lb 9 6 oz)    Height: 5' (1 524 m)      Body mass index is 37 03 kg/m²      General: cooperative, in not acute distress  Eyes: conjunctivae pink, anicteric sclerae  ENT: lips and mucous membranes moist  Neck: supple, no JVD  Chest: clear breath sounds bilateral, no crackles, ronchus or wheezings  CVS: distinct S1 & S2, normal rate, regular rhythm  Abdomen: obese, soft, non-tender, non-distended, normoactive bowel sounds, ileo conduit over Right lower quadrant  Extremities: minimal tTrace edema of both legs  Skin: no rash  Neuro: awake, alert, oriented      Lab Results:  Results for orders placed or performed in visit on 01/28/19   CBC   Result Value Ref Range    WBC 5 85 4 31 - 10 16 Thousand/uL    RBC 3 89 3 81 - 5 12 Million/uL    Hemoglobin 11 3 (L) 11 5 - 15 4 g/dL    Hematocrit 35 8 34 8 - 46 1 %    MCV 92 82 - 98 fL    MCH 29 0 26 8 - 34 3 pg    MCHC 31 6 31 4 - 37 4 g/dL    RDW 14 5 11 6 - 15 1 %    Platelets 992 (H) 302 - 390 Thousands/uL    MPV 10 7 8 9 - 12 7 fL   Renal function panel   Result Value Ref Range    Albumin 3 5 3 5 - 5 0 g/dL    Calcium 8 6 8 3 - 10 1 mg/dL    Phosphorus 4 4 (H) 2 3 - 4 1 mg/dL    Glucose 95 65 - 140 mg/dL    BUN 39 (H) 5 - 25 mg/dL    Creatinine 2 93 (H) 0 60 - 1 30 mg/dL    Sodium 136 136 - 145 mmol/L    Potassium 4 3 3 5 - 5 3 mmol/L    Chloride 108 100 - 108 mmol/L    CO2 20 (L) 21 - 32 mmol/L    ANION GAP 8 4 - 13 mmol/L    eGFR 15 ml/min/1 73sq m   Protein / creatinine ratio, urine   Result Value Ref Range    Creatinine, Ur 85 6 mg/dL    Protein Urine Random 72 mg/dL    Prot/Creat Ratio, Ur 0 84 (H) 0 00 - 0 10         Portions of the record may have been created with voice recognition software  Occasional wrong word or "sound a like" substitutions may have occurred due to the inherent limitations of voice recognition software  Read the chart carefully and recognize, using context, where substitutions have occurred  If you have any questions, please contact the dictating provider

## 2019-02-13 ENCOUNTER — TELEPHONE (OUTPATIENT)
Dept: UROLOGY | Facility: MEDICAL CENTER | Age: 74
End: 2019-02-13

## 2019-02-13 DIAGNOSIS — D45 POLYCYTHEMIA VERA (HCC): ICD-10-CM

## 2019-02-13 RX ORDER — RUXOLITINIB 10 MG/1
10 TABLET ORAL DAILY
Qty: 30 TABLET | Refills: 5 | Status: SHIPPED | OUTPATIENT
Start: 2019-02-13 | End: 2019-08-12 | Stop reason: SDUPTHER

## 2019-02-13 NOTE — TELEPHONE ENCOUNTER
Kristal Pascual from [employer] called requesting a verbal order for barrier strips for the patient  She can be reached at [phone]

## 2019-02-14 ENCOUNTER — HOSPITAL ENCOUNTER (OUTPATIENT)
Dept: RADIOLOGY | Facility: HOSPITAL | Age: 74
Discharge: HOME | End: 2019-02-14
Payer: COMMERCIAL

## 2019-02-14 DIAGNOSIS — S06.5X9A SUBDURAL HEMATOMA, ACUTE (HCC): ICD-10-CM

## 2019-02-14 DIAGNOSIS — R93.0 ABNORMAL CT OF THE HEAD: ICD-10-CM

## 2019-02-14 PROCEDURE — 70551 MRI BRAIN STEM W/O DYE: CPT

## 2019-02-27 ENCOUNTER — OFFICE VISIT (OUTPATIENT)
Dept: NEUROSURGERY | Facility: CLINIC | Age: 74
End: 2019-02-27
Payer: COMMERCIAL

## 2019-02-27 VITALS
DIASTOLIC BLOOD PRESSURE: 82 MMHG | TEMPERATURE: 98.1 F | WEIGHT: 190 LBS | RESPIRATION RATE: 16 BRPM | BODY MASS INDEX: 35.87 KG/M2 | HEIGHT: 61 IN | HEART RATE: 68 BPM | SYSTOLIC BLOOD PRESSURE: 126 MMHG

## 2019-02-27 DIAGNOSIS — D33.0 BENIGN NEOPLASM OF SUPRATENTORIAL REGION OF BRAIN (HCC): ICD-10-CM

## 2019-02-27 DIAGNOSIS — R90.89 ABNORMAL FINDING ON MRI OF BRAIN: Primary | ICD-10-CM

## 2019-02-27 DIAGNOSIS — D32.9 MENINGIOMA (HCC): ICD-10-CM

## 2019-02-27 DIAGNOSIS — Z86.79 HISTORY OF SUBDURAL HEMATOMA: ICD-10-CM

## 2019-02-27 PROCEDURE — 99213 OFFICE O/P EST LOW 20 MIN: CPT | Performed by: PHYSICIAN ASSISTANT

## 2019-02-27 NOTE — LETTER
February 27, 2019     Ronak Tyson MD  [id number] [id number]    Patient: Pam Guzman   YOB: 1945   Date of Visit: 2/27/2019       Dear Dr Osmar Escalona: Thank you for referring Teodoro Baltazar to me for evaluation  Below are my notes for this consultation  If you have questions, please do not hesitate to call me  I look forward to following your patient along with you  Sincerely,        Mariluz Khanna MD        CC: No Recipients  Kassidy Monique PA-C  2/27/2019  3:41 PM  Sign at close encounter  Assessment/Plan:    Very pleasant 64-year-old female, returns to review MRI brain  She has a history of a subdural hematoma secondary to fall, incidental note is made on that study of abnormality right occipital horn  MRI brain without contrast 2/14/19 is carefully reviewed in detail by Dr Marky Renee, the prior identified mass on CT head in the right occipital horn, most likely representing a meningioma remains unchanged  There is no prior imaging for comparison  Study was reviewed with the patient  She remains asymptomatic of this finding  One skin she does have a history of fall and subdural hematoma remains completely resolved at this time  On examination today there are no focal neurologic deficits appreciated, Romberg is negative, there is no pronator drift, motor examination the upper lower extremities is 5 x 5 for power, reflexes are intact and symmetric, sensation is also grossly intact, cranial nerves are also grossly intact  She denies gait or balance disturbance, motor sensory difficulties in the upper lower extremities, bowel or bladder incontinence, difficulty with memory or mentation, difficulty with executive function  Further follow-up with Neurosurgery is planned in approximately 6 months with repeat imaging, MRI brain (as noted she has poor renal function, GFR 17 last check, noncontrast study is planned)      She may gradually return to all usual activities without restriction as noted at prior visit,    Again she is advised to avoid aspirin, or NSAIDs, and she has restarted her Eliquis for clotting disorder, she has history of chronic saddle pulmonary embolisms  These findings, impressions and recommendations are reviewed in great detail with the patient, she expressed understanding and agreement, her questions were answered completely and to her satisfaction  Follow up has been scheduled  Diagnoses and all orders for this visit:    Abnormal finding on MRI of brain  -     MRI brain without contrast; Future    Benign neoplasm of supratentorial region of brain [facility])  -     MRI brain without contrast; Future    Meningioma ([facility] [address] )  -     MRI brain without contrast; Future    History of subdural hematoma          Return in about 6 months (around 8/27/2019) for Review MRI brain  Subjective:      Patient ID: Inocencio Hayes is a 68 y o  female  HPI    The following portions of the patient's history were reviewed and updated as appropriate: allergies, current medications, past family history, past medical history, past social history and past surgical history  Review of Systems   Constitutional: Negative  HENT: Negative  Eyes: Negative  Respiratory: Negative  Cardiovascular: Negative  Gastrointestinal: Negative  Endocrine: Negative  Genitourinary: Negative  Musculoskeletal: Negative  Skin: Negative  Allergic/Immunologic: Negative  Neurological: Negative  Hematological: Bruises/bleeds easily (On Eliquis)  Psychiatric/Behavioral: Negative  Objective:    Physical Exam   Constitutional: She is oriented to person, place, and time  She appears well-developed and well-nourished  HENT:   Head: Normocephalic and atraumatic  Eyes: Pupils are equal, round, and reactive to light  EOM are normal    Cardiovascular: Normal rate     Pulmonary/Chest: Effort normal and breath sounds normal  "  Neurological: She is alert and oriented to person, place, and time  Skin: Skin is warm and dry  Psychiatric: She has a normal mood and affect  Neurologic Exam     Mental Status   Oriented to person, place, and time  Cranial Nerves     CN III, IV, VI   Pupils are equal, round, and reactive to light  Extraocular motions are normal             MRI BRAIN WITHOUT CONTRAST   2/14/19     INDICATION: R93 0: Abnormal findings on diagnostic imaging of skull and head, not elsewhere classified  S06  5X9A: Traumatic subdural hemorrhage with loss of consciousness of unspecified duration, initial encounter      COMPARISON:   1/28/2019 CT     TECHNIQUE:  Sagittal T1, axial T2, axial FLAIR, axial T1, axial [place] and axial diffusion imaging      IMAGE QUALITY:  Diagnostic      FINDINGS:   BRAIN PARENCHYMA:   Moderate chronic microvascular ischemic changes are present within the subcortical and deep white matter of the frontal and parietal lobes bilaterally        No acute ischemic disease is identified        Thin left tentorial subdural hemorrhage has resolved      Age-appropriate cerebral atrophy is present       A mass measuring approximately 9 mm x 11 mm is present in the posterior right temporal lobe inseparable from the trigone region of the lateral ventricle  The imaging characteristics suggest that it is a partially calcified mass such as meningioma  Alternative etiology such as cavernous angioma considered less likely  The appearance does not support acute hemorrhage  No mass effect on adjacent structures  The lesion is unchanged dating back through the earliest available CT from 1/11/2019  6   month CT follow-up recommended unless renal function improves permitting IV contrast-enhanced MRI         There is no midline shift  No acute hemorrhage    Brainstem and cerebellum demonstrate normal signal   Diffusion imaging is unremarkable      VENTRICLES: No hydrocephalus      SELLA AND PITUITARY GLAND:  " Normal      ORBITS:  Normal      PARANASAL SINUSES: Left maxillary mucus retention cyst again evident      VASCULATURE:  Evaluation of the major intracranial vasculature demonstrates appropriate flow voids      CALVARIUM AND SKULL BASE: Normal      EXTRACRANIAL SOFT TISSUES: Resolution of previous minimal right parietal scalp contusion     IMPRESSION:  Moderate chronic microvascular ischemic disease     No acute ischemic disease     Age-related atrophy     Right trigone region 9 mm x 11 mm partially calcified mass most suspicious for intraventricular meningioma  Subependymal cavernous angioma not excluded  No significant mass effect  No evidence of hydrocephalus    Similar to 1/11/2019 CT      Six-month follow-up head CT is recommended to confirm stability unless renal function improves permitting IV contrast-enhanced MRI

## 2019-02-27 NOTE — PROGRESS NOTES
Assessment/Plan:    Very pleasant 61-year-old female, returns to review MRI brain  She has a history of a subdural hematoma secondary to fall, incidental note is made on that study of abnormality right occipital horn  MRI brain without contrast 2/14/19 is carefully reviewed in detail by Dr Heladio Lamb, the prior identified mass on CT head in the right occipital horn, most likely representing a meningioma remains unchanged  There is no prior imaging for comparison  Study was reviewed with the patient  She remains asymptomatic of this finding  One skin she does have a history of fall and subdural hematoma remains completely resolved at this time  On examination today there are no focal neurologic deficits appreciated, Romberg is negative, there is no pronator drift, motor examination the upper lower extremities is 5 x 5 for power, reflexes are intact and symmetric, sensation is also grossly intact, cranial nerves are also grossly intact  She denies gait or balance disturbance, motor sensory difficulties in the upper lower extremities, bowel or bladder incontinence, difficulty with memory or mentation, difficulty with executive function  Further follow-up with Neurosurgery is planned in approximately 6 months with repeat imaging, MRI brain (as noted she has poor renal function, GFR 17 last check, noncontrast study is planned)  She may gradually return to all usual activities without restriction as noted at prior visit,    Again she is advised to avoid aspirin, or NSAIDs, and she has restarted her Eliquis for clotting disorder, she has history of chronic saddle pulmonary embolisms  These findings, impressions and recommendations are reviewed in great detail with the patient, she expressed understanding and agreement, her questions were answered completely and to her satisfaction  Follow up has been scheduled         Diagnoses and all orders for this visit:    Abnormal finding on MRI of brain  - "    MRI brain without contrast; Future    Benign neoplasm of supratentorial region of brain [facility])  -     MRI brain without contrast; Future    Meningioma (HCC)  -     MRI brain without contrast; Future    History of subdural hematoma          Return in about 6 months (around 8/27/2019) for Review MRI brain  Subjective:      Patient ID: David Hutton is a 68 y o  female  HPI    The following portions of the patient's history were reviewed and updated as appropriate: allergies, current medications, past family history, past medical history, past social history and past surgical history  Review of Systems   Constitutional: Negative  HENT: Negative  Eyes: Negative  Respiratory: Negative  Cardiovascular: Negative  Gastrointestinal: Negative  Endocrine: Negative  Genitourinary: Negative  Musculoskeletal: Negative  Skin: Negative  Allergic/Immunologic: Negative  Neurological: Negative  Hematological: Bruises/bleeds easily (On Eliquis)  Psychiatric/Behavioral: Negative  Objective:    Physical Exam   Constitutional: She is oriented to person, place, and time  She appears well-developed and well-nourished  HENT:   Head: Normocephalic and atraumatic  Eyes: Pupils are equal, round, and reactive to light  EOM are normal    Cardiovascular: Normal rate  Pulmonary/Chest: Effort normal and breath sounds normal    Neurological: She is alert and oriented to person, place, and time  Skin: Skin is warm and dry  Psychiatric: She has a normal mood and affect  Neurologic Exam     Mental Status   Oriented to person, place, and time  Cranial Nerves     CN III, IV, VI   Pupils are equal, round, and reactive to light  Extraocular motions are normal             MRI BRAIN WITHOUT CONTRAST   2/14/19     INDICATION: R93 0: Abnormal findings on diagnostic imaging of skull and head, not elsewhere classified  S06  5X9A: Traumatic subdural hemorrhage with loss of " consciousness of unspecified duration, initial encounter      COMPARISON:   1/28/2019 CT     TECHNIQUE:  Sagittal T1, axial T2, axial FLAIR, axial T1, axial [place] and axial diffusion imaging      IMAGE QUALITY:  Diagnostic      FINDINGS:   BRAIN PARENCHYMA:   Moderate chronic microvascular ischemic changes are present within the subcortical and deep white matter of the frontal and parietal lobes bilaterally        No acute ischemic disease is identified        Thin left tentorial subdural hemorrhage has resolved      Age-appropriate cerebral atrophy is present       A mass measuring approximately 9 mm x 11 mm is present in the posterior right temporal lobe inseparable from the trigone region of the lateral ventricle  The imaging characteristics suggest that it is a partially calcified mass such as meningioma  Alternative etiology such as cavernous angioma considered less likely  The appearance does not support acute hemorrhage  No mass effect on adjacent structures  The lesion is unchanged dating back through the earliest available CT from 1/11/2019  6   month CT follow-up recommended unless renal function improves permitting IV contrast-enhanced MRI         There is no midline shift  No acute hemorrhage    Brainstem and cerebellum demonstrate normal signal   Diffusion imaging is unremarkable      VENTRICLES: No hydrocephalus      SELLA AND PITUITARY GLAND:  Normal      ORBITS:  Normal      PARANASAL SINUSES: Left maxillary mucus retention cyst again evident      VASCULATURE:  Evaluation of the major intracranial vasculature demonstrates appropriate flow voids      CALVARIUM AND SKULL BASE: Normal      EXTRACRANIAL SOFT TISSUES: Resolution of previous minimal right parietal scalp contusion     IMPRESSION:  Moderate chronic microvascular ischemic disease     No acute ischemic disease     Age-related atrophy     Right trigone region 9 mm x 11 mm partially calcified mass most suspicious for intraventricular meningioma  Subependymal cavernous angioma not excluded  No significant mass effect  No evidence of hydrocephalus    Similar to 1/11/2019 CT      Six-month follow-up head CT is recommended to confirm stability unless renal function improves permitting IV contrast-enhanced MRI

## 2019-02-27 NOTE — PATIENT INSTRUCTIONS
Further follow-up with Neurosurgery in approximately 6 months , Dr Miller/Neel([facility])    MRI brain without contrast few weeks before follow-up with Neurosurgery  Gradual return to all usual activities without restriction

## 2019-04-18 ENCOUNTER — TRANSCRIBE ORDERS (OUTPATIENT)
Dept: LAB | Facility: HOSPITAL | Age: 74
End: 2019-04-18

## 2019-04-18 ENCOUNTER — APPOINTMENT (OUTPATIENT)
Dept: LAB | Facility: HOSPITAL | Age: 74
End: 2019-04-18
Payer: COMMERCIAL

## 2019-04-18 DIAGNOSIS — I27.82 CHRONIC SADDLE PULMONARY EMBOLISM WITHOUT ACUTE COR PULMONALE (HCC): Chronic | ICD-10-CM

## 2019-04-18 DIAGNOSIS — E03.9 MYXEDEMA HEART DISEASE: Primary | ICD-10-CM

## 2019-04-18 DIAGNOSIS — I51.9 MYXEDEMA HEART DISEASE: Primary | ICD-10-CM

## 2019-04-18 DIAGNOSIS — I26.92 CHRONIC SADDLE PULMONARY EMBOLISM WITHOUT ACUTE COR PULMONALE (HCC): Chronic | ICD-10-CM

## 2019-04-18 DIAGNOSIS — D75.1 POLYCYTHEMIA: ICD-10-CM

## 2019-04-18 DIAGNOSIS — I51.9 MYXEDEMA HEART DISEASE: ICD-10-CM

## 2019-04-18 DIAGNOSIS — E03.9 MYXEDEMA HEART DISEASE: ICD-10-CM

## 2019-04-18 DIAGNOSIS — E78.5 HYPERLIPIDEMIA, UNSPECIFIED HYPERLIPIDEMIA TYPE: ICD-10-CM

## 2019-04-18 DIAGNOSIS — Z90.6 HISTORY OF TOTAL CYSTECTOMY: ICD-10-CM

## 2019-04-18 DIAGNOSIS — N18.4 CKD (CHRONIC KIDNEY DISEASE) STAGE 4, GFR 15-29 ML/MIN (HCC): Chronic | ICD-10-CM

## 2019-04-18 DIAGNOSIS — N18.4 STAGE 4 CHRONIC KIDNEY DISEASE (HCC): ICD-10-CM

## 2019-04-18 DIAGNOSIS — N18.4 CHRONIC KIDNEY DISEASE (CKD), STAGE IV (SEVERE) (HCC): ICD-10-CM

## 2019-04-18 DIAGNOSIS — D45 POLYCYTHEMIA VERA (HCC): ICD-10-CM

## 2019-04-18 LAB
ALBUMIN SERPL BCP-MCNC: 3.5 G/DL (ref 3.5–5)
ALP SERPL-CCNC: 105 U/L (ref 46–116)
ALT SERPL W P-5'-P-CCNC: 15 U/L (ref 12–78)
ANION GAP SERPL CALCULATED.3IONS-SCNC: 5 MMOL/L (ref 4–13)
AST SERPL W P-5'-P-CCNC: 24 U/L (ref 5–45)
BASOPHILS # BLD AUTO: 0.07 THOUSANDS/ΜL (ref 0–0.1)
BASOPHILS NFR BLD AUTO: 1 % (ref 0–1)
BILIRUB SERPL-MCNC: 0.43 MG/DL (ref 0.2–1)
BUN SERPL-MCNC: 54 MG/DL (ref 5–25)
CALCIUM SERPL-MCNC: 8.6 MG/DL (ref 8.3–10.1)
CHLORIDE SERPL-SCNC: 112 MMOL/L (ref 100–108)
CHOLEST SERPL-MCNC: 217 MG/DL (ref 50–200)
CO2 SERPL-SCNC: 22 MMOL/L (ref 21–32)
CREAT SERPL-MCNC: 2.94 MG/DL (ref 0.6–1.3)
CREAT UR-MCNC: 62.5 MG/DL
EOSINOPHIL # BLD AUTO: 0.08 THOUSAND/ΜL (ref 0–0.61)
EOSINOPHIL NFR BLD AUTO: 2 % (ref 0–6)
ERYTHROCYTE [DISTWIDTH] IN BLOOD BY AUTOMATED COUNT: 14.9 % (ref 11.6–15.1)
GFR SERPL CREATININE-BSD FRML MDRD: 15 ML/MIN/1.73SQ M
GLUCOSE P FAST SERPL-MCNC: 84 MG/DL (ref 65–99)
HCT VFR BLD AUTO: 36.7 % (ref 34.8–46.1)
HDLC SERPL-MCNC: 70 MG/DL (ref 40–60)
HGB BLD-MCNC: 11.7 G/DL (ref 11.5–15.4)
IMM GRANULOCYTES # BLD AUTO: 0.03 THOUSAND/UL (ref 0–0.2)
IMM GRANULOCYTES NFR BLD AUTO: 1 % (ref 0–2)
LDLC SERPL CALC-MCNC: 125 MG/DL (ref 0–100)
LYMPHOCYTES # BLD AUTO: 0.97 THOUSANDS/ΜL (ref 0.6–4.47)
LYMPHOCYTES NFR BLD AUTO: 18 % (ref 14–44)
MCH RBC QN AUTO: 29.8 PG (ref 26.8–34.3)
MCHC RBC AUTO-ENTMCNC: 31.9 G/DL (ref 31.4–37.4)
MCV RBC AUTO: 93 FL (ref 82–98)
MONOCYTES # BLD AUTO: 0.29 THOUSAND/ΜL (ref 0.17–1.22)
MONOCYTES NFR BLD AUTO: 5 % (ref 4–12)
NEUTROPHILS # BLD AUTO: 4.02 THOUSANDS/ΜL (ref 1.85–7.62)
NEUTS SEG NFR BLD AUTO: 73 % (ref 43–75)
NONHDLC SERPL-MCNC: 147 MG/DL
NRBC BLD AUTO-RTO: 0 /100 WBCS
PHOSPHATE SERPL-MCNC: 3.6 MG/DL (ref 2.3–4.1)
PLATELET # BLD AUTO: 397 THOUSANDS/UL (ref 149–390)
PMV BLD AUTO: 10.2 FL (ref 8.9–12.7)
POTASSIUM SERPL-SCNC: 4.1 MMOL/L (ref 3.5–5.3)
PROT SERPL-MCNC: 6.9 G/DL (ref 6.4–8.2)
PROT UR-MCNC: 223 MG/DL
PROT/CREAT UR: 3.57 MG/G{CREAT} (ref 0–0.1)
PTH-INTACT SERPL-MCNC: 212.6 PG/ML (ref 18.4–80.1)
RBC # BLD AUTO: 3.93 MILLION/UL (ref 3.81–5.12)
SODIUM SERPL-SCNC: 139 MMOL/L (ref 136–145)
TRIGL SERPL-MCNC: 110 MG/DL
TSH SERPL DL<=0.05 MIU/L-ACNC: 2.79 UIU/ML (ref 0.36–3.74)
WBC # BLD AUTO: 5.46 THOUSAND/UL (ref 4.31–10.16)

## 2019-04-18 PROCEDURE — 82570 ASSAY OF URINE CREATININE: CPT

## 2019-04-18 PROCEDURE — 84156 ASSAY OF PROTEIN URINE: CPT

## 2019-04-18 PROCEDURE — 84443 ASSAY THYROID STIM HORMONE: CPT

## 2019-04-18 PROCEDURE — 80061 LIPID PANEL: CPT

## 2019-04-18 PROCEDURE — 85025 COMPLETE CBC W/AUTO DIFF WBC: CPT

## 2019-04-18 PROCEDURE — 83970 ASSAY OF PARATHORMONE: CPT

## 2019-04-18 PROCEDURE — 36415 COLL VENOUS BLD VENIPUNCTURE: CPT

## 2019-04-18 PROCEDURE — 80053 COMPREHEN METABOLIC PANEL: CPT

## 2019-04-18 PROCEDURE — 84100 ASSAY OF PHOSPHORUS: CPT

## 2019-04-23 ENCOUNTER — OFFICE VISIT (OUTPATIENT)
Dept: HEMATOLOGY ONCOLOGY | Facility: CLINIC | Age: 74
End: 2019-04-23
Payer: COMMERCIAL

## 2019-04-23 VITALS
TEMPERATURE: 98.1 F | BODY MASS INDEX: 35.87 KG/M2 | HEART RATE: 75 BPM | OXYGEN SATURATION: 98 % | RESPIRATION RATE: 16 BRPM | WEIGHT: 190 LBS | SYSTOLIC BLOOD PRESSURE: 140 MMHG | DIASTOLIC BLOOD PRESSURE: 70 MMHG | HEIGHT: 61 IN

## 2019-04-23 DIAGNOSIS — I26.92 CHRONIC SADDLE PULMONARY EMBOLISM WITHOUT ACUTE COR PULMONALE (HCC): Chronic | ICD-10-CM

## 2019-04-23 DIAGNOSIS — D45 POLYCYTHEMIA VERA (HCC): Primary | ICD-10-CM

## 2019-04-23 DIAGNOSIS — I27.82 CHRONIC SADDLE PULMONARY EMBOLISM WITHOUT ACUTE COR PULMONALE (HCC): Chronic | ICD-10-CM

## 2019-04-23 PROCEDURE — 99214 OFFICE O/P EST MOD 30 MIN: CPT | Performed by: INTERNAL MEDICINE

## 2019-04-25 ENCOUNTER — TELEPHONE (OUTPATIENT)
Dept: NEPHROLOGY | Facility: CLINIC | Age: 74
End: 2019-04-25

## 2019-05-16 ENCOUNTER — OFFICE VISIT (OUTPATIENT)
Dept: NEPHROLOGY | Facility: CLINIC | Age: 74
End: 2019-05-16
Payer: COMMERCIAL

## 2019-05-16 VITALS
HEART RATE: 74 BPM | SYSTOLIC BLOOD PRESSURE: 140 MMHG | DIASTOLIC BLOOD PRESSURE: 81 MMHG | BODY MASS INDEX: 38.6 KG/M2 | HEIGHT: 60 IN | WEIGHT: 196.6 LBS

## 2019-05-16 DIAGNOSIS — N25.81 SECONDARY HYPERPARATHYROIDISM OF RENAL ORIGIN (HCC): ICD-10-CM

## 2019-05-16 DIAGNOSIS — N32.89 BLADDER MASS: ICD-10-CM

## 2019-05-16 DIAGNOSIS — N18.4 STAGE 4 CHRONIC KIDNEY DISEASE (HCC): Primary | ICD-10-CM

## 2019-05-16 DIAGNOSIS — I10 ESSENTIAL HYPERTENSION: ICD-10-CM

## 2019-05-16 DIAGNOSIS — N13.9 OBSTRUCTIVE UROPATHY: ICD-10-CM

## 2019-05-16 DIAGNOSIS — D45 POLYCYTHEMIA VERA (HCC): ICD-10-CM

## 2019-05-16 PROCEDURE — 99214 OFFICE O/P EST MOD 30 MIN: CPT | Performed by: INTERNAL MEDICINE

## 2019-05-16 RX ORDER — AMLODIPINE BESYLATE 10 MG/1
10 TABLET ORAL DAILY
COMMUNITY
Start: 2019-04-09 | End: 2019-09-13 | Stop reason: SINTOL

## 2019-05-23 ENCOUNTER — APPOINTMENT (EMERGENCY)
Dept: RADIOLOGY | Facility: HOSPITAL | Age: 74
DRG: 872 | End: 2019-05-23
Payer: COMMERCIAL

## 2019-05-23 ENCOUNTER — APPOINTMENT (INPATIENT)
Dept: RADIOLOGY | Facility: HOSPITAL | Age: 74
DRG: 872 | End: 2019-05-23
Payer: COMMERCIAL

## 2019-05-23 ENCOUNTER — HOSPITAL ENCOUNTER (INPATIENT)
Facility: HOSPITAL | Age: 74
LOS: 9 days | Discharge: NURSING, REHAB OR LONG-TERM CARE | DRG: 872 | End: 2019-06-01
Attending: EMERGENCY MEDICINE | Admitting: INTERNAL MEDICINE
Payer: COMMERCIAL

## 2019-05-23 DIAGNOSIS — N39.0 UTI (URINARY TRACT INFECTION): ICD-10-CM

## 2019-05-23 DIAGNOSIS — E87.2 ACIDOSIS, METABOLIC: ICD-10-CM

## 2019-05-23 DIAGNOSIS — A41.9 SEPSIS (HCC): Primary | ICD-10-CM

## 2019-05-23 DIAGNOSIS — S12.9XXA CERVICAL SPINE FRACTURE (HCC): ICD-10-CM

## 2019-05-23 DIAGNOSIS — S22.000A THORACIC COMPRESSION FRACTURE (HCC): ICD-10-CM

## 2019-05-23 DIAGNOSIS — S22.009A THORACIC SPINE FRACTURE (HCC): ICD-10-CM

## 2019-05-23 DIAGNOSIS — N18.9 CHRONIC KIDNEY DISEASE: ICD-10-CM

## 2019-05-23 DIAGNOSIS — N18.4 STAGE 4 CHRONIC KIDNEY DISEASE (HCC): ICD-10-CM

## 2019-05-23 PROBLEM — D63.1 ANEMIA IN CKD (CHRONIC KIDNEY DISEASE): Status: ACTIVE | Noted: 2017-07-07

## 2019-05-23 PROBLEM — E87.20 ACIDOSIS, METABOLIC: Status: ACTIVE | Noted: 2017-01-23

## 2019-05-23 LAB
ALBUMIN SERPL BCP-MCNC: 3.5 G/DL (ref 3.5–5)
ALP SERPL-CCNC: 109 U/L (ref 46–116)
ALT SERPL W P-5'-P-CCNC: 25 U/L (ref 12–78)
ANION GAP SERPL CALCULATED.3IONS-SCNC: 12 MMOL/L (ref 4–13)
APTT PPP: 34 SECONDS (ref 26–38)
AST SERPL W P-5'-P-CCNC: 67 U/L (ref 5–45)
ATRIAL RATE: 87 BPM
BACTERIA UR QL AUTO: ABNORMAL /HPF
BASOPHILS # BLD AUTO: 0.06 THOUSANDS/ΜL (ref 0–0.1)
BASOPHILS NFR BLD AUTO: 0 % (ref 0–1)
BILIRUB SERPL-MCNC: 0.79 MG/DL (ref 0.2–1)
BILIRUB UR QL STRIP: NEGATIVE
BUN SERPL-MCNC: 52 MG/DL (ref 5–25)
CALCIUM SERPL-MCNC: 8.9 MG/DL (ref 8.3–10.1)
CHLORIDE SERPL-SCNC: 109 MMOL/L (ref 100–108)
CLARITY UR: ABNORMAL
CO2 SERPL-SCNC: 18 MMOL/L (ref 21–32)
COLOR UR: ABNORMAL
CREAT SERPL-MCNC: 3.68 MG/DL (ref 0.6–1.3)
EOSINOPHIL # BLD AUTO: 0.01 THOUSAND/ΜL (ref 0–0.61)
EOSINOPHIL NFR BLD AUTO: 0 % (ref 0–6)
ERYTHROCYTE [DISTWIDTH] IN BLOOD BY AUTOMATED COUNT: 14.5 % (ref 11.6–15.1)
GFR SERPL CREATININE-BSD FRML MDRD: 12 ML/MIN/1.73SQ M
GLUCOSE SERPL-MCNC: 135 MG/DL (ref 65–140)
GLUCOSE SERPL-MCNC: 147 MG/DL (ref 65–140)
GLUCOSE SERPL-MCNC: 153 MG/DL (ref 65–140)
GLUCOSE SERPL-MCNC: 174 MG/DL (ref 65–140)
GLUCOSE UR STRIP-MCNC: NEGATIVE MG/DL
HCT VFR BLD AUTO: 37.5 % (ref 34.8–46.1)
HGB BLD-MCNC: 12.5 G/DL (ref 11.5–15.4)
HGB UR QL STRIP.AUTO: ABNORMAL
IMM GRANULOCYTES # BLD AUTO: 0.22 THOUSAND/UL (ref 0–0.2)
IMM GRANULOCYTES NFR BLD AUTO: 2 % (ref 0–2)
INR PPP: 1.26 (ref 0.86–1.17)
KETONES UR STRIP-MCNC: NEGATIVE MG/DL
LACTATE SERPL-SCNC: 1.2 MMOL/L (ref 0.5–2)
LACTATE SERPL-SCNC: 1.5 MMOL/L (ref 0.5–2)
LACTATE SERPL-SCNC: 2.2 MMOL/L (ref 0.5–2)
LEUKOCYTE ESTERASE UR QL STRIP: ABNORMAL
LYMPHOCYTES # BLD AUTO: 0.25 THOUSANDS/ΜL (ref 0.6–4.47)
LYMPHOCYTES NFR BLD AUTO: 2 % (ref 14–44)
MCH RBC QN AUTO: 30.1 PG (ref 26.8–34.3)
MCHC RBC AUTO-ENTMCNC: 33.3 G/DL (ref 31.4–37.4)
MCV RBC AUTO: 90 FL (ref 82–98)
MONOCYTES # BLD AUTO: 0.67 THOUSAND/ΜL (ref 0.17–1.22)
MONOCYTES NFR BLD AUTO: 5 % (ref 4–12)
MUCOUS THREADS UR QL AUTO: ABNORMAL
NEUTROPHILS # BLD AUTO: 12.67 THOUSANDS/ΜL (ref 1.85–7.62)
NEUTS SEG NFR BLD AUTO: 91 % (ref 43–75)
NITRITE UR QL STRIP: POSITIVE
NON-SQ EPI CELLS URNS QL MICRO: ABNORMAL /HPF
NRBC BLD AUTO-RTO: 0 /100 WBCS
P AXIS: 81 DEGREES
PH UR STRIP.AUTO: 8.5 [PH]
PLATELET # BLD AUTO: 324 THOUSANDS/UL (ref 149–390)
PMV BLD AUTO: 9.7 FL (ref 8.9–12.7)
POTASSIUM SERPL-SCNC: 4.1 MMOL/L (ref 3.5–5.3)
PR INTERVAL: 146 MS
PROCALCITONIN SERPL-MCNC: 0.91 NG/ML
PROCALCITONIN SERPL-MCNC: 1.34 NG/ML
PROT SERPL-MCNC: 7.1 G/DL (ref 6.4–8.2)
PROT UR STRIP-MCNC: ABNORMAL MG/DL
PROTHROMBIN TIME: 15.9 SECONDS (ref 11.8–14.2)
QRS AXIS: 21 DEGREES
QRSD INTERVAL: 78 MS
QT INTERVAL: 344 MS
QTC INTERVAL: 413 MS
RBC # BLD AUTO: 4.15 MILLION/UL (ref 3.81–5.12)
RBC #/AREA URNS AUTO: ABNORMAL /HPF
SODIUM SERPL-SCNC: 139 MMOL/L (ref 136–145)
SP GR UR STRIP.AUTO: 1.01 (ref 1–1.03)
T WAVE AXIS: 41 DEGREES
TROPONIN I SERPL-MCNC: <0.02 NG/ML
UROBILINOGEN UR QL STRIP.AUTO: 0.2 E.U./DL
VENTRICULAR RATE: 87 BPM
WBC # BLD AUTO: 13.88 THOUSAND/UL (ref 4.31–10.16)
WBC #/AREA URNS AUTO: ABNORMAL /HPF

## 2019-05-23 PROCEDURE — 99223 1ST HOSP IP/OBS HIGH 75: CPT | Performed by: INTERNAL MEDICINE

## 2019-05-23 PROCEDURE — 72100 X-RAY EXAM L-S SPINE 2/3 VWS: CPT

## 2019-05-23 PROCEDURE — 83605 ASSAY OF LACTIC ACID: CPT | Performed by: INTERNAL MEDICINE

## 2019-05-23 PROCEDURE — 80053 COMPREHEN METABOLIC PANEL: CPT | Performed by: EMERGENCY MEDICINE

## 2019-05-23 PROCEDURE — 87186 SC STD MICRODIL/AGAR DIL: CPT | Performed by: EMERGENCY MEDICINE

## 2019-05-23 PROCEDURE — 36415 COLL VENOUS BLD VENIPUNCTURE: CPT | Performed by: EMERGENCY MEDICINE

## 2019-05-23 PROCEDURE — 87077 CULTURE AEROBIC IDENTIFY: CPT | Performed by: EMERGENCY MEDICINE

## 2019-05-23 PROCEDURE — 83605 ASSAY OF LACTIC ACID: CPT | Performed by: EMERGENCY MEDICINE

## 2019-05-23 PROCEDURE — 82948 REAGENT STRIP/BLOOD GLUCOSE: CPT

## 2019-05-23 PROCEDURE — 71045 X-RAY EXAM CHEST 1 VIEW: CPT

## 2019-05-23 PROCEDURE — 85610 PROTHROMBIN TIME: CPT | Performed by: EMERGENCY MEDICINE

## 2019-05-23 PROCEDURE — 87086 URINE CULTURE/COLONY COUNT: CPT | Performed by: EMERGENCY MEDICINE

## 2019-05-23 PROCEDURE — 93010 ELECTROCARDIOGRAM REPORT: CPT | Performed by: INTERNAL MEDICINE

## 2019-05-23 PROCEDURE — 99285 EMERGENCY DEPT VISIT HI MDM: CPT | Performed by: EMERGENCY MEDICINE

## 2019-05-23 PROCEDURE — 99285 EMERGENCY DEPT VISIT HI MDM: CPT

## 2019-05-23 PROCEDURE — 96360 HYDRATION IV INFUSION INIT: CPT

## 2019-05-23 PROCEDURE — 87040 BLOOD CULTURE FOR BACTERIA: CPT | Performed by: EMERGENCY MEDICINE

## 2019-05-23 PROCEDURE — 93005 ELECTROCARDIOGRAM TRACING: CPT

## 2019-05-23 PROCEDURE — 76770 US EXAM ABDO BACK WALL COMP: CPT

## 2019-05-23 PROCEDURE — 84145 PROCALCITONIN (PCT): CPT | Performed by: EMERGENCY MEDICINE

## 2019-05-23 PROCEDURE — 85730 THROMBOPLASTIN TIME PARTIAL: CPT | Performed by: EMERGENCY MEDICINE

## 2019-05-23 PROCEDURE — 96365 THER/PROPH/DIAG IV INF INIT: CPT

## 2019-05-23 PROCEDURE — 81001 URINALYSIS AUTO W/SCOPE: CPT | Performed by: EMERGENCY MEDICINE

## 2019-05-23 PROCEDURE — 84484 ASSAY OF TROPONIN QUANT: CPT | Performed by: EMERGENCY MEDICINE

## 2019-05-23 PROCEDURE — 70450 CT HEAD/BRAIN W/O DYE: CPT

## 2019-05-23 PROCEDURE — 99222 1ST HOSP IP/OBS MODERATE 55: CPT | Performed by: INTERNAL MEDICINE

## 2019-05-23 PROCEDURE — 72125 CT NECK SPINE W/O DYE: CPT

## 2019-05-23 PROCEDURE — 84145 PROCALCITONIN (PCT): CPT | Performed by: INTERNAL MEDICINE

## 2019-05-23 PROCEDURE — 85025 COMPLETE CBC W/AUTO DIFF WBC: CPT | Performed by: EMERGENCY MEDICINE

## 2019-05-23 RX ORDER — CALCITRIOL 0.25 UG/1
0.25 CAPSULE, LIQUID FILLED ORAL EVERY OTHER DAY
Status: DISCONTINUED | OUTPATIENT
Start: 2019-05-23 | End: 2019-05-23

## 2019-05-23 RX ORDER — ACETAMINOPHEN 325 MG/1
650 TABLET ORAL ONCE
Status: COMPLETED | OUTPATIENT
Start: 2019-05-23 | End: 2019-05-23

## 2019-05-23 RX ORDER — MELATONIN
1000 DAILY
Status: DISCONTINUED | OUTPATIENT
Start: 2019-05-23 | End: 2019-06-01 | Stop reason: HOSPADM

## 2019-05-23 RX ORDER — ACETAMINOPHEN 325 MG/1
650 TABLET ORAL EVERY 6 HOURS PRN
Status: DISCONTINUED | OUTPATIENT
Start: 2019-05-23 | End: 2019-06-01 | Stop reason: HOSPADM

## 2019-05-23 RX ORDER — CITALOPRAM 20 MG/1
20 TABLET ORAL DAILY
Status: DISCONTINUED | OUTPATIENT
Start: 2019-05-23 | End: 2019-06-01 | Stop reason: HOSPADM

## 2019-05-23 RX ORDER — LEVOTHYROXINE SODIUM 0.07 MG/1
75 TABLET ORAL
Status: DISCONTINUED | OUTPATIENT
Start: 2019-05-24 | End: 2019-06-01 | Stop reason: HOSPADM

## 2019-05-23 RX ORDER — CALCITRIOL 0.25 UG/1
0.25 CAPSULE, LIQUID FILLED ORAL
Status: DISCONTINUED | OUTPATIENT
Start: 2019-05-24 | End: 2019-06-01 | Stop reason: HOSPADM

## 2019-05-23 RX ORDER — CHOLESTYRAMINE LIGHT 4 G/5.7G
4 POWDER, FOR SUSPENSION ORAL 2 TIMES DAILY
Status: DISCONTINUED | OUTPATIENT
Start: 2019-05-23 | End: 2019-06-01 | Stop reason: HOSPADM

## 2019-05-23 RX ORDER — HYDROMORPHONE HCL/PF 1 MG/ML
0.2 SYRINGE (ML) INJECTION
Status: DISCONTINUED | OUTPATIENT
Start: 2019-05-23 | End: 2019-06-01 | Stop reason: HOSPADM

## 2019-05-23 RX ORDER — OXYCODONE HYDROCHLORIDE 5 MG/1
2.5 TABLET ORAL EVERY 4 HOURS PRN
Status: DISCONTINUED | OUTPATIENT
Start: 2019-05-23 | End: 2019-06-01 | Stop reason: HOSPADM

## 2019-05-23 RX ORDER — SACCHAROMYCES BOULARDII 250 MG
250 CAPSULE ORAL DAILY
Status: DISCONTINUED | OUTPATIENT
Start: 2019-05-23 | End: 2019-06-01 | Stop reason: HOSPADM

## 2019-05-23 RX ORDER — OXYCODONE HYDROCHLORIDE 5 MG/1
5 TABLET ORAL EVERY 4 HOURS PRN
Status: DISCONTINUED | OUTPATIENT
Start: 2019-05-23 | End: 2019-06-01 | Stop reason: HOSPADM

## 2019-05-23 RX ADMIN — CITALOPRAM HYDROBROMIDE 20 MG: 20 TABLET ORAL at 13:54

## 2019-05-23 RX ADMIN — SODIUM BICARBONATE 100 ML/HR: 84 INJECTION, SOLUTION INTRAVENOUS at 14:57

## 2019-05-23 RX ADMIN — APIXABAN 2.5 MG: 2.5 TABLET, FILM COATED ORAL at 19:53

## 2019-05-23 RX ADMIN — SODIUM CHLORIDE 1000 ML: 0.9 INJECTION, SOLUTION INTRAVENOUS at 10:46

## 2019-05-23 RX ADMIN — CEFEPIME HYDROCHLORIDE 2000 MG: 2 INJECTION, POWDER, FOR SOLUTION INTRAVENOUS at 11:08

## 2019-05-23 RX ADMIN — Medication 250 MG: at 13:54

## 2019-05-23 RX ADMIN — OXYCODONE HYDROCHLORIDE 5 MG: 5 TABLET ORAL at 17:27

## 2019-05-23 RX ADMIN — ACETAMINOPHEN 650 MG: 325 TABLET ORAL at 11:08

## 2019-05-23 RX ADMIN — VANCOMYCIN HYDROCHLORIDE 125 MG: 5 INJECTION, POWDER, LYOPHILIZED, FOR SOLUTION INTRAVENOUS at 22:44

## 2019-05-23 RX ADMIN — VITAMIN D, TAB 1000IU (100/BT) 1000 UNITS: 25 TAB at 13:54

## 2019-05-24 PROBLEM — S22.000A THORACIC COMPRESSION FRACTURE (HCC): Status: ACTIVE | Noted: 2019-05-24

## 2019-05-24 LAB
ANION GAP SERPL CALCULATED.3IONS-SCNC: 10 MMOL/L (ref 4–13)
BUN SERPL-MCNC: 44 MG/DL (ref 5–25)
CALCIUM SERPL-MCNC: 7.9 MG/DL (ref 8.3–10.1)
CHLORIDE SERPL-SCNC: 111 MMOL/L (ref 100–108)
CO2 SERPL-SCNC: 16 MMOL/L (ref 21–32)
CREAT SERPL-MCNC: 3.24 MG/DL (ref 0.6–1.3)
ERYTHROCYTE [DISTWIDTH] IN BLOOD BY AUTOMATED COUNT: 14.6 % (ref 11.6–15.1)
GFR SERPL CREATININE-BSD FRML MDRD: 14 ML/MIN/1.73SQ M
GLUCOSE SERPL-MCNC: 104 MG/DL (ref 65–140)
GLUCOSE SERPL-MCNC: 124 MG/DL (ref 65–140)
GLUCOSE SERPL-MCNC: 132 MG/DL (ref 65–140)
GLUCOSE SERPL-MCNC: 133 MG/DL (ref 65–140)
GLUCOSE SERPL-MCNC: 97 MG/DL (ref 65–140)
HCT VFR BLD AUTO: 30.3 % (ref 34.8–46.1)
HGB BLD-MCNC: 9.9 G/DL (ref 11.5–15.4)
MAGNESIUM SERPL-MCNC: 2.1 MG/DL (ref 1.6–2.6)
MCH RBC QN AUTO: 30.6 PG (ref 26.8–34.3)
MCHC RBC AUTO-ENTMCNC: 32.7 G/DL (ref 31.4–37.4)
MCV RBC AUTO: 94 FL (ref 82–98)
PHOSPHATE SERPL-MCNC: 2.9 MG/DL (ref 2.3–4.1)
PLATELET # BLD AUTO: 186 THOUSANDS/UL (ref 149–390)
PMV BLD AUTO: 11.5 FL (ref 8.9–12.7)
POTASSIUM SERPL-SCNC: 4.1 MMOL/L (ref 3.5–5.3)
PROCALCITONIN SERPL-MCNC: 2.26 NG/ML
RBC # BLD AUTO: 3.24 MILLION/UL (ref 3.81–5.12)
SODIUM SERPL-SCNC: 137 MMOL/L (ref 136–145)
WBC # BLD AUTO: 7.49 THOUSAND/UL (ref 4.31–10.16)

## 2019-05-24 PROCEDURE — 84145 PROCALCITONIN (PCT): CPT | Performed by: INTERNAL MEDICINE

## 2019-05-24 PROCEDURE — 85027 COMPLETE CBC AUTOMATED: CPT | Performed by: INTERNAL MEDICINE

## 2019-05-24 PROCEDURE — G8988 SELF CARE GOAL STATUS: HCPCS

## 2019-05-24 PROCEDURE — 84100 ASSAY OF PHOSPHORUS: CPT | Performed by: INTERNAL MEDICINE

## 2019-05-24 PROCEDURE — 80048 BASIC METABOLIC PNL TOTAL CA: CPT | Performed by: INTERNAL MEDICINE

## 2019-05-24 PROCEDURE — 99232 SBSQ HOSP IP/OBS MODERATE 35: CPT | Performed by: INTERNAL MEDICINE

## 2019-05-24 PROCEDURE — G8987 SELF CARE CURRENT STATUS: HCPCS

## 2019-05-24 PROCEDURE — 82948 REAGENT STRIP/BLOOD GLUCOSE: CPT

## 2019-05-24 PROCEDURE — 83735 ASSAY OF MAGNESIUM: CPT | Performed by: INTERNAL MEDICINE

## 2019-05-24 PROCEDURE — 99222 1ST HOSP IP/OBS MODERATE 55: CPT | Performed by: NURSE PRACTITIONER

## 2019-05-24 PROCEDURE — 99232 SBSQ HOSP IP/OBS MODERATE 35: CPT | Performed by: NURSE PRACTITIONER

## 2019-05-24 PROCEDURE — 99223 1ST HOSP IP/OBS HIGH 75: CPT | Performed by: INTERNAL MEDICINE

## 2019-05-24 PROCEDURE — 99222 1ST HOSP IP/OBS MODERATE 55: CPT | Performed by: NEUROLOGICAL SURGERY

## 2019-05-24 PROCEDURE — 97167 OT EVAL HIGH COMPLEX 60 MIN: CPT

## 2019-05-24 RX ORDER — METHOCARBAMOL 500 MG/1
250 TABLET, FILM COATED ORAL EVERY 8 HOURS SCHEDULED
Status: DISCONTINUED | OUTPATIENT
Start: 2019-05-24 | End: 2019-06-01 | Stop reason: HOSPADM

## 2019-05-24 RX ORDER — LIDOCAINE 50 MG/G
1 PATCH TOPICAL DAILY
Status: DISCONTINUED | OUTPATIENT
Start: 2019-05-24 | End: 2019-06-01 | Stop reason: HOSPADM

## 2019-05-24 RX ADMIN — Medication 250 MG: at 09:02

## 2019-05-24 RX ADMIN — VANCOMYCIN HYDROCHLORIDE 125 MG: 5 INJECTION, POWDER, LYOPHILIZED, FOR SOLUTION INTRAVENOUS at 09:02

## 2019-05-24 RX ADMIN — CITALOPRAM HYDROBROMIDE 20 MG: 20 TABLET ORAL at 09:02

## 2019-05-24 RX ADMIN — APIXABAN 2.5 MG: 2.5 TABLET, FILM COATED ORAL at 09:02

## 2019-05-24 RX ADMIN — SODIUM BICARBONATE 80 ML/HR: 84 INJECTION, SOLUTION INTRAVENOUS at 00:05

## 2019-05-24 RX ADMIN — CHOLESTYRAMINE 4 G: 4 POWDER, FOR SUSPENSION ORAL at 17:30

## 2019-05-24 RX ADMIN — VANCOMYCIN HYDROCHLORIDE 125 MG: 5 INJECTION, POWDER, LYOPHILIZED, FOR SOLUTION INTRAVENOUS at 21:57

## 2019-05-24 RX ADMIN — CHOLESTYRAMINE 4 G: 4 POWDER, FOR SUSPENSION ORAL at 08:58

## 2019-05-24 RX ADMIN — SODIUM BICARBONATE 80 ML/HR: 84 INJECTION, SOLUTION INTRAVENOUS at 12:40

## 2019-05-24 RX ADMIN — APIXABAN 2.5 MG: 2.5 TABLET, FILM COATED ORAL at 17:28

## 2019-05-24 RX ADMIN — METHOCARBAMOL 250 MG: 500 TABLET, FILM COATED ORAL at 21:48

## 2019-05-24 RX ADMIN — OXYCODONE HYDROCHLORIDE 5 MG: 5 TABLET ORAL at 14:46

## 2019-05-24 RX ADMIN — LIDOCAINE 1 PATCH: 50 PATCH CUTANEOUS at 17:28

## 2019-05-24 RX ADMIN — LEVOTHYROXINE SODIUM 75 MCG: 75 TABLET ORAL at 05:50

## 2019-05-24 RX ADMIN — METHOCARBAMOL 250 MG: 500 TABLET, FILM COATED ORAL at 17:28

## 2019-05-24 RX ADMIN — CEFEPIME HYDROCHLORIDE 1000 MG: 1 INJECTION, POWDER, FOR SOLUTION INTRAMUSCULAR; INTRAVENOUS at 10:44

## 2019-05-24 RX ADMIN — METOPROLOL TARTRATE 12.5 MG: 25 TABLET ORAL at 21:51

## 2019-05-24 RX ADMIN — OXYCODONE HYDROCHLORIDE 5 MG: 5 TABLET ORAL at 09:05

## 2019-05-24 RX ADMIN — OXYCODONE HYDROCHLORIDE 5 MG: 5 TABLET ORAL at 22:37

## 2019-05-24 RX ADMIN — VITAMIN D, TAB 1000IU (100/BT) 1000 UNITS: 25 TAB at 09:02

## 2019-05-25 ENCOUNTER — APPOINTMENT (INPATIENT)
Dept: RADIOLOGY | Facility: HOSPITAL | Age: 74
DRG: 872 | End: 2019-05-25
Payer: COMMERCIAL

## 2019-05-25 LAB
BACTERIA UR CULT: ABNORMAL
GLUCOSE SERPL-MCNC: 107 MG/DL (ref 65–140)

## 2019-05-25 PROCEDURE — 99232 SBSQ HOSP IP/OBS MODERATE 35: CPT | Performed by: UROLOGY

## 2019-05-25 PROCEDURE — 99232 SBSQ HOSP IP/OBS MODERATE 35: CPT | Performed by: INTERNAL MEDICINE

## 2019-05-25 PROCEDURE — 99233 SBSQ HOSP IP/OBS HIGH 50: CPT | Performed by: INTERNAL MEDICINE

## 2019-05-25 PROCEDURE — 97535 SELF CARE MNGMENT TRAINING: CPT

## 2019-05-25 PROCEDURE — 72080 X-RAY EXAM THORACOLMB 2/> VW: CPT

## 2019-05-25 PROCEDURE — 99232 SBSQ HOSP IP/OBS MODERATE 35: CPT | Performed by: PHYSICIAN ASSISTANT

## 2019-05-25 PROCEDURE — 82948 REAGENT STRIP/BLOOD GLUCOSE: CPT

## 2019-05-25 PROCEDURE — 99232 SBSQ HOSP IP/OBS MODERATE 35: CPT | Performed by: NURSE PRACTITIONER

## 2019-05-25 RX ORDER — SIMETHICONE 80 MG
80 TABLET,CHEWABLE ORAL EVERY 6 HOURS PRN
Status: DISCONTINUED | OUTPATIENT
Start: 2019-05-25 | End: 2019-06-01 | Stop reason: HOSPADM

## 2019-05-25 RX ORDER — ONDANSETRON 2 MG/ML
4 INJECTION INTRAMUSCULAR; INTRAVENOUS ONCE
Status: COMPLETED | OUTPATIENT
Start: 2019-05-25 | End: 2019-05-25

## 2019-05-25 RX ORDER — ONDANSETRON 2 MG/ML
4 INJECTION INTRAMUSCULAR; INTRAVENOUS EVERY 8 HOURS PRN
Status: DISCONTINUED | OUTPATIENT
Start: 2019-05-25 | End: 2019-06-01 | Stop reason: HOSPADM

## 2019-05-25 RX ADMIN — CEFTRIAXONE SODIUM 1000 MG: 10 INJECTION, POWDER, FOR SOLUTION INTRAVENOUS at 21:35

## 2019-05-25 RX ADMIN — METOPROLOL TARTRATE 12.5 MG: 25 TABLET ORAL at 08:36

## 2019-05-25 RX ADMIN — VANCOMYCIN HYDROCHLORIDE 125 MG: 5 INJECTION, POWDER, LYOPHILIZED, FOR SOLUTION INTRAVENOUS at 08:36

## 2019-05-25 RX ADMIN — CEFEPIME HYDROCHLORIDE 1000 MG: 1 INJECTION, POWDER, FOR SOLUTION INTRAMUSCULAR; INTRAVENOUS at 11:05

## 2019-05-25 RX ADMIN — SODIUM BICARBONATE 80 ML/HR: 84 INJECTION, SOLUTION INTRAVENOUS at 14:23

## 2019-05-25 RX ADMIN — METOPROLOL TARTRATE 12.5 MG: 25 TABLET ORAL at 21:21

## 2019-05-25 RX ADMIN — LIDOCAINE 1 PATCH: 50 PATCH CUTANEOUS at 18:46

## 2019-05-25 RX ADMIN — METHOCARBAMOL 250 MG: 500 TABLET, FILM COATED ORAL at 21:21

## 2019-05-25 RX ADMIN — CHOLESTYRAMINE 4 G: 4 POWDER, FOR SUSPENSION ORAL at 08:22

## 2019-05-25 RX ADMIN — LEVOTHYROXINE SODIUM 75 MCG: 75 TABLET ORAL at 05:20

## 2019-05-25 RX ADMIN — METHOCARBAMOL 250 MG: 500 TABLET, FILM COATED ORAL at 05:21

## 2019-05-25 RX ADMIN — SODIUM BICARBONATE 80 ML/HR: 84 INJECTION, SOLUTION INTRAVENOUS at 00:50

## 2019-05-25 RX ADMIN — APIXABAN 2.5 MG: 2.5 TABLET, FILM COATED ORAL at 08:36

## 2019-05-25 RX ADMIN — VANCOMYCIN HYDROCHLORIDE 125 MG: 5 INJECTION, POWDER, LYOPHILIZED, FOR SOLUTION INTRAVENOUS at 21:21

## 2019-05-25 RX ADMIN — CITALOPRAM HYDROBROMIDE 20 MG: 20 TABLET ORAL at 08:36

## 2019-05-25 RX ADMIN — Medication 250 MG: at 08:36

## 2019-05-25 RX ADMIN — APIXABAN 2.5 MG: 2.5 TABLET, FILM COATED ORAL at 18:46

## 2019-05-25 RX ADMIN — METHOCARBAMOL 250 MG: 500 TABLET, FILM COATED ORAL at 14:19

## 2019-05-25 RX ADMIN — ONDANSETRON 4 MG: 2 INJECTION INTRAMUSCULAR; INTRAVENOUS at 09:38

## 2019-05-25 RX ADMIN — VITAMIN D, TAB 1000IU (100/BT) 1000 UNITS: 25 TAB at 08:36

## 2019-05-26 LAB
ANION GAP SERPL CALCULATED.3IONS-SCNC: 8 MMOL/L (ref 4–13)
BASOPHILS # BLD AUTO: 0.03 THOUSANDS/ΜL (ref 0–0.1)
BASOPHILS NFR BLD AUTO: 1 % (ref 0–1)
BUN SERPL-MCNC: 26 MG/DL (ref 5–25)
CALCIUM SERPL-MCNC: 7.9 MG/DL (ref 8.3–10.1)
CHLORIDE SERPL-SCNC: 109 MMOL/L (ref 100–108)
CO2 SERPL-SCNC: 25 MMOL/L (ref 21–32)
CREAT SERPL-MCNC: 2.72 MG/DL (ref 0.6–1.3)
EOSINOPHIL # BLD AUTO: 0.07 THOUSAND/ΜL (ref 0–0.61)
EOSINOPHIL NFR BLD AUTO: 2 % (ref 0–6)
ERYTHROCYTE [DISTWIDTH] IN BLOOD BY AUTOMATED COUNT: 14.1 % (ref 11.6–15.1)
GFR SERPL CREATININE-BSD FRML MDRD: 17 ML/MIN/1.73SQ M
GLUCOSE SERPL-MCNC: 101 MG/DL (ref 65–140)
HCT VFR BLD AUTO: 31 % (ref 34.8–46.1)
HGB BLD-MCNC: 9.8 G/DL (ref 11.5–15.4)
IMM GRANULOCYTES # BLD AUTO: 0.07 THOUSAND/UL (ref 0–0.2)
IMM GRANULOCYTES NFR BLD AUTO: 2 % (ref 0–2)
LYMPHOCYTES # BLD AUTO: 0.26 THOUSANDS/ΜL (ref 0.6–4.47)
LYMPHOCYTES NFR BLD AUTO: 6 % (ref 14–44)
MCH RBC QN AUTO: 28.7 PG (ref 26.8–34.3)
MCHC RBC AUTO-ENTMCNC: 31.6 G/DL (ref 31.4–37.4)
MCV RBC AUTO: 91 FL (ref 82–98)
MONOCYTES # BLD AUTO: 0.3 THOUSAND/ΜL (ref 0.17–1.22)
MONOCYTES NFR BLD AUTO: 6 % (ref 4–12)
NEUTROPHILS # BLD AUTO: 3.95 THOUSANDS/ΜL (ref 1.85–7.62)
NEUTS SEG NFR BLD AUTO: 83 % (ref 43–75)
NRBC BLD AUTO-RTO: 0 /100 WBCS
PLATELET # BLD AUTO: 185 THOUSANDS/UL (ref 149–390)
PMV BLD AUTO: 10.3 FL (ref 8.9–12.7)
POTASSIUM SERPL-SCNC: 2.8 MMOL/L (ref 3.5–5.3)
PROCALCITONIN SERPL-MCNC: 0.82 NG/ML
RBC # BLD AUTO: 3.42 MILLION/UL (ref 3.81–5.12)
SODIUM SERPL-SCNC: 142 MMOL/L (ref 136–145)
WBC # BLD AUTO: 4.68 THOUSAND/UL (ref 4.31–10.16)

## 2019-05-26 PROCEDURE — 99232 SBSQ HOSP IP/OBS MODERATE 35: CPT | Performed by: INTERNAL MEDICINE

## 2019-05-26 PROCEDURE — 99232 SBSQ HOSP IP/OBS MODERATE 35: CPT | Performed by: NURSE PRACTITIONER

## 2019-05-26 PROCEDURE — 84145 PROCALCITONIN (PCT): CPT | Performed by: NURSE PRACTITIONER

## 2019-05-26 PROCEDURE — 85025 COMPLETE CBC W/AUTO DIFF WBC: CPT | Performed by: NURSE PRACTITIONER

## 2019-05-26 PROCEDURE — 80048 BASIC METABOLIC PNL TOTAL CA: CPT | Performed by: INTERNAL MEDICINE

## 2019-05-26 RX ORDER — POTASSIUM CHLORIDE 20 MEQ/1
40 TABLET, EXTENDED RELEASE ORAL 2 TIMES DAILY
Status: COMPLETED | OUTPATIENT
Start: 2019-05-26 | End: 2019-05-26

## 2019-05-26 RX ORDER — SODIUM BICARBONATE 650 MG/1
650 TABLET ORAL
Status: DISCONTINUED | OUTPATIENT
Start: 2019-05-27 | End: 2019-05-28

## 2019-05-26 RX ORDER — AMLODIPINE BESYLATE 5 MG/1
5 TABLET ORAL DAILY
Status: DISCONTINUED | OUTPATIENT
Start: 2019-05-26 | End: 2019-05-27

## 2019-05-26 RX ORDER — CEFDINIR 300 MG/1
300 CAPSULE ORAL EVERY 24 HOURS
Status: COMPLETED | OUTPATIENT
Start: 2019-05-26 | End: 2019-05-29

## 2019-05-26 RX ADMIN — AMLODIPINE BESYLATE 5 MG: 5 TABLET ORAL at 10:25

## 2019-05-26 RX ADMIN — VANCOMYCIN HYDROCHLORIDE 125 MG: 5 INJECTION, POWDER, LYOPHILIZED, FOR SOLUTION INTRAVENOUS at 09:00

## 2019-05-26 RX ADMIN — VANCOMYCIN HYDROCHLORIDE 125 MG: 5 INJECTION, POWDER, LYOPHILIZED, FOR SOLUTION INTRAVENOUS at 21:56

## 2019-05-26 RX ADMIN — POTASSIUM CHLORIDE 40 MEQ: 1500 TABLET, EXTENDED RELEASE ORAL at 10:25

## 2019-05-26 RX ADMIN — METOPROLOL TARTRATE 12.5 MG: 25 TABLET ORAL at 08:56

## 2019-05-26 RX ADMIN — METOPROLOL TARTRATE 12.5 MG: 25 TABLET ORAL at 21:55

## 2019-05-26 RX ADMIN — LIDOCAINE 1 PATCH: 50 PATCH CUTANEOUS at 17:23

## 2019-05-26 RX ADMIN — CHOLESTYRAMINE 4 G: 4 POWDER, FOR SUSPENSION ORAL at 17:23

## 2019-05-26 RX ADMIN — APIXABAN 2.5 MG: 2.5 TABLET, FILM COATED ORAL at 17:22

## 2019-05-26 RX ADMIN — APIXABAN 2.5 MG: 2.5 TABLET, FILM COATED ORAL at 08:56

## 2019-05-26 RX ADMIN — METHOCARBAMOL 250 MG: 500 TABLET, FILM COATED ORAL at 21:55

## 2019-05-26 RX ADMIN — CHOLESTYRAMINE 4 G: 4 POWDER, FOR SUSPENSION ORAL at 08:48

## 2019-05-26 RX ADMIN — VITAMIN D, TAB 1000IU (100/BT) 1000 UNITS: 25 TAB at 08:56

## 2019-05-26 RX ADMIN — SODIUM BICARBONATE 80 ML/HR: 84 INJECTION, SOLUTION INTRAVENOUS at 02:31

## 2019-05-26 RX ADMIN — LEVOTHYROXINE SODIUM 75 MCG: 75 TABLET ORAL at 06:09

## 2019-05-26 RX ADMIN — POTASSIUM CHLORIDE 40 MEQ: 1500 TABLET, EXTENDED RELEASE ORAL at 17:22

## 2019-05-26 RX ADMIN — CITALOPRAM HYDROBROMIDE 20 MG: 20 TABLET ORAL at 08:56

## 2019-05-26 RX ADMIN — METHOCARBAMOL 250 MG: 500 TABLET, FILM COATED ORAL at 06:09

## 2019-05-26 RX ADMIN — CEFDINIR 300 MG: 300 CAPSULE ORAL at 17:28

## 2019-05-26 RX ADMIN — Medication 250 MG: at 08:56

## 2019-05-26 RX ADMIN — METHOCARBAMOL 250 MG: 500 TABLET, FILM COATED ORAL at 14:02

## 2019-05-27 LAB
ANION GAP SERPL CALCULATED.3IONS-SCNC: 8 MMOL/L (ref 4–13)
BUN SERPL-MCNC: 24 MG/DL (ref 5–25)
CALCIUM SERPL-MCNC: 8.4 MG/DL (ref 8.3–10.1)
CHLORIDE SERPL-SCNC: 109 MMOL/L (ref 100–108)
CO2 SERPL-SCNC: 24 MMOL/L (ref 21–32)
CREAT SERPL-MCNC: 2.81 MG/DL (ref 0.6–1.3)
GFR SERPL CREATININE-BSD FRML MDRD: 16 ML/MIN/1.73SQ M
GLUCOSE SERPL-MCNC: 105 MG/DL (ref 65–140)
POTASSIUM SERPL-SCNC: 2.9 MMOL/L (ref 3.5–5.3)
SODIUM SERPL-SCNC: 141 MMOL/L (ref 136–145)

## 2019-05-27 PROCEDURE — 80048 BASIC METABOLIC PNL TOTAL CA: CPT | Performed by: NURSE PRACTITIONER

## 2019-05-27 PROCEDURE — 99232 SBSQ HOSP IP/OBS MODERATE 35: CPT | Performed by: INTERNAL MEDICINE

## 2019-05-27 PROCEDURE — 99232 SBSQ HOSP IP/OBS MODERATE 35: CPT | Performed by: NURSE PRACTITIONER

## 2019-05-27 PROCEDURE — G8979 MOBILITY GOAL STATUS: HCPCS

## 2019-05-27 PROCEDURE — 97163 PT EVAL HIGH COMPLEX 45 MIN: CPT

## 2019-05-27 PROCEDURE — G8978 MOBILITY CURRENT STATUS: HCPCS

## 2019-05-27 RX ORDER — LANOLIN ALCOHOL/MO/W.PET/CERES
6 CREAM (GRAM) TOPICAL
Status: DISCONTINUED | OUTPATIENT
Start: 2019-05-27 | End: 2019-06-01 | Stop reason: HOSPADM

## 2019-05-27 RX ORDER — POTASSIUM CHLORIDE 20 MEQ/1
40 TABLET, EXTENDED RELEASE ORAL 2 TIMES DAILY
Status: DISCONTINUED | OUTPATIENT
Start: 2019-05-27 | End: 2019-05-28

## 2019-05-27 RX ORDER — AMLODIPINE BESYLATE 10 MG/1
10 TABLET ORAL DAILY
Status: DISCONTINUED | OUTPATIENT
Start: 2019-05-28 | End: 2019-05-28

## 2019-05-27 RX ORDER — AMLODIPINE BESYLATE 5 MG/1
5 TABLET ORAL ONCE
Status: COMPLETED | OUTPATIENT
Start: 2019-05-27 | End: 2019-05-27

## 2019-05-27 RX ADMIN — ACETAMINOPHEN 650 MG: 325 TABLET ORAL at 08:37

## 2019-05-27 RX ADMIN — METHOCARBAMOL 250 MG: 500 TABLET, FILM COATED ORAL at 21:04

## 2019-05-27 RX ADMIN — CALCITRIOL CAPSULES 0.25 MCG 0.25 MCG: 0.25 CAPSULE ORAL at 08:30

## 2019-05-27 RX ADMIN — OXYCODONE HYDROCHLORIDE 5 MG: 5 TABLET ORAL at 21:01

## 2019-05-27 RX ADMIN — LEVOTHYROXINE SODIUM 75 MCG: 75 TABLET ORAL at 05:20

## 2019-05-27 RX ADMIN — AMLODIPINE BESYLATE 5 MG: 5 TABLET ORAL at 10:39

## 2019-05-27 RX ADMIN — METOPROLOL TARTRATE 12.5 MG: 25 TABLET ORAL at 08:31

## 2019-05-27 RX ADMIN — VANCOMYCIN HYDROCHLORIDE 125 MG: 5 INJECTION, POWDER, LYOPHILIZED, FOR SOLUTION INTRAVENOUS at 21:01

## 2019-05-27 RX ADMIN — VANCOMYCIN HYDROCHLORIDE 125 MG: 5 INJECTION, POWDER, LYOPHILIZED, FOR SOLUTION INTRAVENOUS at 08:32

## 2019-05-27 RX ADMIN — CITALOPRAM HYDROBROMIDE 20 MG: 20 TABLET ORAL at 08:31

## 2019-05-27 RX ADMIN — LIDOCAINE 1 PATCH: 50 PATCH CUTANEOUS at 17:30

## 2019-05-27 RX ADMIN — MELATONIN 6 MG: at 01:46

## 2019-05-27 RX ADMIN — SODIUM BICARBONATE 650 MG TABLET 650 MG: at 08:30

## 2019-05-27 RX ADMIN — POTASSIUM CHLORIDE 40 MEQ: 1500 TABLET, EXTENDED RELEASE ORAL at 10:39

## 2019-05-27 RX ADMIN — MELATONIN 6 MG: at 21:01

## 2019-05-27 RX ADMIN — Medication 250 MG: at 08:30

## 2019-05-27 RX ADMIN — POTASSIUM CHLORIDE 40 MEQ: 1500 TABLET, EXTENDED RELEASE ORAL at 17:26

## 2019-05-27 RX ADMIN — APIXABAN 2.5 MG: 2.5 TABLET, FILM COATED ORAL at 08:30

## 2019-05-27 RX ADMIN — METHOCARBAMOL 250 MG: 500 TABLET, FILM COATED ORAL at 05:20

## 2019-05-27 RX ADMIN — CHOLESTYRAMINE 4 G: 4 POWDER, FOR SUSPENSION ORAL at 08:31

## 2019-05-27 RX ADMIN — CEFDINIR 300 MG: 300 CAPSULE ORAL at 17:26

## 2019-05-27 RX ADMIN — METOPROLOL TARTRATE 12.5 MG: 25 TABLET ORAL at 21:01

## 2019-05-27 RX ADMIN — METHOCARBAMOL 250 MG: 500 TABLET, FILM COATED ORAL at 13:24

## 2019-05-27 RX ADMIN — CHOLESTYRAMINE 4 G: 4 POWDER, FOR SUSPENSION ORAL at 17:26

## 2019-05-27 RX ADMIN — VITAMIN D, TAB 1000IU (100/BT) 1000 UNITS: 25 TAB at 08:31

## 2019-05-27 RX ADMIN — SODIUM BICARBONATE 650 MG TABLET 650 MG: at 17:26

## 2019-05-27 RX ADMIN — AMLODIPINE BESYLATE 5 MG: 5 TABLET ORAL at 08:31

## 2019-05-27 RX ADMIN — APIXABAN 2.5 MG: 2.5 TABLET, FILM COATED ORAL at 17:26

## 2019-05-28 ENCOUNTER — TELEPHONE (OUTPATIENT)
Dept: NEUROSURGERY | Facility: CLINIC | Age: 74
End: 2019-05-28

## 2019-05-28 LAB
ANION GAP SERPL CALCULATED.3IONS-SCNC: 5 MMOL/L (ref 4–13)
BACTERIA BLD CULT: NORMAL
BACTERIA BLD CULT: NORMAL
BUN SERPL-MCNC: 26 MG/DL (ref 5–25)
CALCIUM SERPL-MCNC: 8.7 MG/DL (ref 8.3–10.1)
CHLORIDE SERPL-SCNC: 113 MMOL/L (ref 100–108)
CO2 SERPL-SCNC: 19 MMOL/L (ref 21–32)
CREAT SERPL-MCNC: 2.91 MG/DL (ref 0.6–1.3)
GFR SERPL CREATININE-BSD FRML MDRD: 15 ML/MIN/1.73SQ M
GLUCOSE SERPL-MCNC: 97 MG/DL (ref 65–140)
POTASSIUM SERPL-SCNC: 4.4 MMOL/L (ref 3.5–5.3)
SODIUM SERPL-SCNC: 137 MMOL/L (ref 136–145)

## 2019-05-28 PROCEDURE — 99232 SBSQ HOSP IP/OBS MODERATE 35: CPT | Performed by: INTERNAL MEDICINE

## 2019-05-28 PROCEDURE — 97530 THERAPEUTIC ACTIVITIES: CPT

## 2019-05-28 PROCEDURE — 80048 BASIC METABOLIC PNL TOTAL CA: CPT | Performed by: NURSE PRACTITIONER

## 2019-05-28 PROCEDURE — 84166 PROTEIN E-PHORESIS/URINE/CSF: CPT | Performed by: PATHOLOGY

## 2019-05-28 PROCEDURE — 84166 PROTEIN E-PHORESIS/URINE/CSF: CPT | Performed by: INTERNAL MEDICINE

## 2019-05-28 PROCEDURE — 97535 SELF CARE MNGMENT TRAINING: CPT

## 2019-05-28 RX ORDER — AMLODIPINE BESYLATE 10 MG/1
10 TABLET ORAL DAILY
Status: DISCONTINUED | OUTPATIENT
Start: 2019-05-29 | End: 2019-06-01 | Stop reason: HOSPADM

## 2019-05-28 RX ORDER — POTASSIUM CHLORIDE 20 MEQ/1
40 TABLET, EXTENDED RELEASE ORAL DAILY
Status: DISCONTINUED | OUTPATIENT
Start: 2019-05-29 | End: 2019-06-01 | Stop reason: HOSPADM

## 2019-05-28 RX ORDER — SODIUM BICARBONATE 650 MG/1
1300 TABLET ORAL
Status: DISCONTINUED | OUTPATIENT
Start: 2019-05-28 | End: 2019-05-29

## 2019-05-28 RX ADMIN — METHOCARBAMOL 250 MG: 500 TABLET, FILM COATED ORAL at 06:11

## 2019-05-28 RX ADMIN — CHOLESTYRAMINE 4 G: 4 POWDER, FOR SUSPENSION ORAL at 17:47

## 2019-05-28 RX ADMIN — METHOCARBAMOL 250 MG: 500 TABLET, FILM COATED ORAL at 15:08

## 2019-05-28 RX ADMIN — Medication 250 MG: at 11:19

## 2019-05-28 RX ADMIN — SIMETHICONE CHEW TAB 80 MG 80 MG: 80 TABLET ORAL at 21:22

## 2019-05-28 RX ADMIN — CHOLESTYRAMINE 4 G: 4 POWDER, FOR SUSPENSION ORAL at 11:19

## 2019-05-28 RX ADMIN — ACETAMINOPHEN 650 MG: 325 TABLET ORAL at 21:22

## 2019-05-28 RX ADMIN — VANCOMYCIN HYDROCHLORIDE 125 MG: 5 INJECTION, POWDER, LYOPHILIZED, FOR SOLUTION INTRAVENOUS at 21:22

## 2019-05-28 RX ADMIN — OXYCODONE HYDROCHLORIDE 5 MG: 5 TABLET ORAL at 11:23

## 2019-05-28 RX ADMIN — METOPROLOL TARTRATE 12.5 MG: 25 TABLET ORAL at 21:22

## 2019-05-28 RX ADMIN — METHOCARBAMOL 250 MG: 500 TABLET, FILM COATED ORAL at 21:21

## 2019-05-28 RX ADMIN — LIDOCAINE 1 PATCH: 50 PATCH CUTANEOUS at 17:45

## 2019-05-28 RX ADMIN — CEFDINIR 300 MG: 300 CAPSULE ORAL at 15:59

## 2019-05-28 RX ADMIN — APIXABAN 2.5 MG: 2.5 TABLET, FILM COATED ORAL at 11:18

## 2019-05-28 RX ADMIN — CITALOPRAM HYDROBROMIDE 20 MG: 20 TABLET ORAL at 11:18

## 2019-05-28 RX ADMIN — SODIUM BICARBONATE 650 MG TABLET 1300 MG: at 17:46

## 2019-05-28 RX ADMIN — APIXABAN 2.5 MG: 2.5 TABLET, FILM COATED ORAL at 17:46

## 2019-05-28 RX ADMIN — LEVOTHYROXINE SODIUM 75 MCG: 75 TABLET ORAL at 06:11

## 2019-05-28 RX ADMIN — VITAMIN D, TAB 1000IU (100/BT) 1000 UNITS: 25 TAB at 11:19

## 2019-05-28 RX ADMIN — VANCOMYCIN HYDROCHLORIDE 125 MG: 5 INJECTION, POWDER, LYOPHILIZED, FOR SOLUTION INTRAVENOUS at 11:23

## 2019-05-28 RX ADMIN — MELATONIN 6 MG: at 21:21

## 2019-05-29 PROBLEM — R31.9 HEMATURIA: Status: ACTIVE | Noted: 2019-05-29

## 2019-05-29 LAB
ANION GAP SERPL CALCULATED.3IONS-SCNC: 13 MMOL/L (ref 4–13)
BASOPHILS # BLD MANUAL: 0.05 THOUSAND/UL (ref 0–0.1)
BASOPHILS NFR MAR MANUAL: 1 % (ref 0–1)
BUN SERPL-MCNC: 27 MG/DL (ref 5–25)
BURR CELLS BLD QL SMEAR: PRESENT
CALCIUM SERPL-MCNC: 8.6 MG/DL (ref 8.3–10.1)
CHLORIDE SERPL-SCNC: 112 MMOL/L (ref 100–108)
CO2 SERPL-SCNC: 18 MMOL/L (ref 21–32)
CREAT SERPL-MCNC: 2.81 MG/DL (ref 0.6–1.3)
EOSINOPHIL # BLD MANUAL: 0.05 THOUSAND/UL (ref 0–0.4)
EOSINOPHIL NFR BLD MANUAL: 1 % (ref 0–6)
ERYTHROCYTE [DISTWIDTH] IN BLOOD BY AUTOMATED COUNT: 14.7 % (ref 11.6–15.1)
FERRITIN SERPL-MCNC: 147 NG/ML (ref 8–388)
GFR SERPL CREATININE-BSD FRML MDRD: 16 ML/MIN/1.73SQ M
GLUCOSE SERPL-MCNC: 102 MG/DL (ref 65–140)
HCT VFR BLD AUTO: 29.6 % (ref 34.8–46.1)
HGB BLD-MCNC: 9.4 G/DL (ref 11.5–15.4)
IRON SATN MFR SERPL: 13 %
IRON SERPL-MCNC: 42 UG/DL (ref 50–170)
LYMPHOCYTES # BLD AUTO: 0.63 THOUSAND/UL (ref 0.6–4.47)
LYMPHOCYTES # BLD AUTO: 12 % (ref 14–44)
MCH RBC QN AUTO: 29.3 PG (ref 26.8–34.3)
MCHC RBC AUTO-ENTMCNC: 31.8 G/DL (ref 31.4–37.4)
MCV RBC AUTO: 92 FL (ref 82–98)
MONOCYTES # BLD AUTO: 0.37 THOUSAND/UL (ref 0–1.22)
MONOCYTES NFR BLD: 7 % (ref 4–12)
NEUTROPHILS # BLD MANUAL: 4.16 THOUSAND/UL (ref 1.85–7.62)
NEUTS SEG NFR BLD AUTO: 79 % (ref 43–75)
NRBC BLD AUTO-RTO: 1 /100 WBCS
PLATELET # BLD AUTO: 224 THOUSANDS/UL (ref 149–390)
PLATELET BLD QL SMEAR: ADEQUATE
PMV BLD AUTO: 10.5 FL (ref 8.9–12.7)
POIKILOCYTOSIS BLD QL SMEAR: PRESENT
POTASSIUM SERPL-SCNC: 3.5 MMOL/L (ref 3.5–5.3)
RBC # BLD AUTO: 3.21 MILLION/UL (ref 3.81–5.12)
RBC MORPH BLD: PRESENT
SODIUM SERPL-SCNC: 143 MMOL/L (ref 136–145)
TIBC SERPL-MCNC: 316 UG/DL (ref 250–450)
TOTAL CELLS COUNTED SPEC: 100
WBC # BLD AUTO: 5.27 THOUSAND/UL (ref 4.31–10.16)

## 2019-05-29 PROCEDURE — 97530 THERAPEUTIC ACTIVITIES: CPT

## 2019-05-29 PROCEDURE — 99232 SBSQ HOSP IP/OBS MODERATE 35: CPT | Performed by: INTERNAL MEDICINE

## 2019-05-29 PROCEDURE — 97110 THERAPEUTIC EXERCISES: CPT

## 2019-05-29 PROCEDURE — 83883 ASSAY NEPHELOMETRY NOT SPEC: CPT | Performed by: INTERNAL MEDICINE

## 2019-05-29 PROCEDURE — 82728 ASSAY OF FERRITIN: CPT | Performed by: INTERNAL MEDICINE

## 2019-05-29 PROCEDURE — 80048 BASIC METABOLIC PNL TOTAL CA: CPT | Performed by: INTERNAL MEDICINE

## 2019-05-29 PROCEDURE — 84165 PROTEIN E-PHORESIS SERUM: CPT | Performed by: INTERNAL MEDICINE

## 2019-05-29 PROCEDURE — 97116 GAIT TRAINING THERAPY: CPT

## 2019-05-29 PROCEDURE — 83550 IRON BINDING TEST: CPT | Performed by: INTERNAL MEDICINE

## 2019-05-29 PROCEDURE — 84165 PROTEIN E-PHORESIS SERUM: CPT | Performed by: PATHOLOGY

## 2019-05-29 PROCEDURE — 85007 BL SMEAR W/DIFF WBC COUNT: CPT | Performed by: INTERNAL MEDICINE

## 2019-05-29 PROCEDURE — 85027 COMPLETE CBC AUTOMATED: CPT | Performed by: INTERNAL MEDICINE

## 2019-05-29 PROCEDURE — 83540 ASSAY OF IRON: CPT | Performed by: INTERNAL MEDICINE

## 2019-05-29 RX ORDER — SODIUM BICARBONATE 650 MG/1
1300 TABLET ORAL
Status: DISCONTINUED | OUTPATIENT
Start: 2019-05-29 | End: 2019-06-01 | Stop reason: HOSPADM

## 2019-05-29 RX ORDER — POTASSIUM CHLORIDE 20 MEQ/1
40 TABLET, EXTENDED RELEASE ORAL ONCE
Status: COMPLETED | OUTPATIENT
Start: 2019-05-29 | End: 2019-05-29

## 2019-05-29 RX ADMIN — SODIUM BICARBONATE 650 MG TABLET 1300 MG: at 18:29

## 2019-05-29 RX ADMIN — LEVOTHYROXINE SODIUM 75 MCG: 75 TABLET ORAL at 06:28

## 2019-05-29 RX ADMIN — LEVOTHYROXINE SODIUM 75 MCG: 75 TABLET ORAL at 10:10

## 2019-05-29 RX ADMIN — VANCOMYCIN HYDROCHLORIDE 125 MG: 5 INJECTION, POWDER, LYOPHILIZED, FOR SOLUTION INTRAVENOUS at 10:17

## 2019-05-29 RX ADMIN — AMLODIPINE BESYLATE 10 MG: 10 TABLET ORAL at 10:09

## 2019-05-29 RX ADMIN — METOPROLOL TARTRATE 25 MG: 25 TABLET ORAL at 10:10

## 2019-05-29 RX ADMIN — LIDOCAINE 1 PATCH: 50 PATCH CUTANEOUS at 17:18

## 2019-05-29 RX ADMIN — POTASSIUM CHLORIDE 40 MEQ: 1500 TABLET, EXTENDED RELEASE ORAL at 14:23

## 2019-05-29 RX ADMIN — METHOCARBAMOL 250 MG: 500 TABLET, FILM COATED ORAL at 14:23

## 2019-05-29 RX ADMIN — APIXABAN 2.5 MG: 2.5 TABLET, FILM COATED ORAL at 10:10

## 2019-05-29 RX ADMIN — CHOLESTYRAMINE 4 G: 4 POWDER, FOR SUSPENSION ORAL at 17:19

## 2019-05-29 RX ADMIN — CALCITRIOL CAPSULES 0.25 MCG 0.25 MCG: 0.25 CAPSULE ORAL at 10:11

## 2019-05-29 RX ADMIN — POTASSIUM CHLORIDE 40 MEQ: 1500 TABLET, EXTENDED RELEASE ORAL at 10:10

## 2019-05-29 RX ADMIN — CEFDINIR 300 MG: 300 CAPSULE ORAL at 17:18

## 2019-05-29 RX ADMIN — VANCOMYCIN HYDROCHLORIDE 125 MG: 5 INJECTION, POWDER, LYOPHILIZED, FOR SOLUTION INTRAVENOUS at 22:42

## 2019-05-29 RX ADMIN — METHOCARBAMOL 250 MG: 500 TABLET, FILM COATED ORAL at 22:40

## 2019-05-29 RX ADMIN — APIXABAN 2.5 MG: 2.5 TABLET, FILM COATED ORAL at 17:18

## 2019-05-29 RX ADMIN — CHOLESTYRAMINE 4 G: 4 POWDER, FOR SUSPENSION ORAL at 10:12

## 2019-05-29 RX ADMIN — METOPROLOL TARTRATE 25 MG: 25 TABLET ORAL at 22:40

## 2019-05-29 RX ADMIN — VITAMIN D, TAB 1000IU (100/BT) 1000 UNITS: 25 TAB at 10:11

## 2019-05-29 RX ADMIN — Medication 250 MG: at 10:10

## 2019-05-29 RX ADMIN — CITALOPRAM HYDROBROMIDE 20 MG: 20 TABLET ORAL at 10:10

## 2019-05-29 RX ADMIN — MELATONIN 6 MG: at 22:42

## 2019-05-29 RX ADMIN — METHOCARBAMOL 250 MG: 500 TABLET, FILM COATED ORAL at 06:28

## 2019-05-30 ENCOUNTER — TELEPHONE (OUTPATIENT)
Dept: OTHER | Facility: HOSPITAL | Age: 74
End: 2019-05-30

## 2019-05-30 ENCOUNTER — TELEPHONE (OUTPATIENT)
Dept: NEPHROLOGY | Facility: CLINIC | Age: 74
End: 2019-05-30

## 2019-05-30 LAB
ALBUMIN SERPL ELPH-MCNC: 2.6 G/DL (ref 3.5–5)
ALBUMIN SERPL ELPH-MCNC: 51.9 % (ref 52–65)
ALPHA1 GLOB SERPL ELPH-MCNC: 0.46 G/DL (ref 0.1–0.4)
ALPHA1 GLOB SERPL ELPH-MCNC: 9.2 % (ref 2.5–5)
ALPHA2 GLOB SERPL ELPH-MCNC: 0.79 G/DL (ref 0.4–1.2)
ALPHA2 GLOB SERPL ELPH-MCNC: 15.7 % (ref 7–13)
ANION GAP SERPL CALCULATED.3IONS-SCNC: 8 MMOL/L (ref 4–13)
BETA GLOB ABNORMAL SERPL ELPH-MCNC: 0.33 G/DL (ref 0.4–0.8)
BETA1 GLOB SERPL ELPH-MCNC: 6.5 % (ref 5–13)
BETA2 GLOB SERPL ELPH-MCNC: 5.3 % (ref 2–8)
BETA2+GAMMA GLOB SERPL ELPH-MCNC: 0.27 G/DL (ref 0.2–0.5)
BUN SERPL-MCNC: 27 MG/DL (ref 5–25)
CALCIUM SERPL-MCNC: 8.2 MG/DL (ref 8.3–10.1)
CHLORIDE SERPL-SCNC: 112 MMOL/L (ref 100–108)
CO2 SERPL-SCNC: 20 MMOL/L (ref 21–32)
CREAT SERPL-MCNC: 2.79 MG/DL (ref 0.6–1.3)
GAMMA GLOB ABNORMAL SERPL ELPH-MCNC: 0.57 G/DL (ref 0.5–1.6)
GAMMA GLOB SERPL ELPH-MCNC: 11.4 % (ref 12–22)
GFR SERPL CREATININE-BSD FRML MDRD: 16 ML/MIN/1.73SQ M
GLUCOSE SERPL-MCNC: 88 MG/DL (ref 65–140)
HCT VFR BLD AUTO: 33 % (ref 34.8–46.1)
HGB BLD-MCNC: 10.4 G/DL (ref 11.5–15.4)
IGG/ALB SER: 1.08 {RATIO} (ref 1.1–1.8)
INR PPP: 1.16 (ref 0.86–1.17)
KAPPA LC FREE SER-MCNC: 57.4 MG/L (ref 3.3–19.4)
KAPPA LC FREE/LAMBDA FREE SER: 1.68 {RATIO} (ref 0.26–1.65)
LAMBDA LC FREE SERPL-MCNC: 34.2 MG/L (ref 5.7–26.3)
POTASSIUM SERPL-SCNC: 4 MMOL/L (ref 3.5–5.3)
PROT PATTERN SERPL ELPH-IMP: ABNORMAL
PROT SERPL-MCNC: 5 G/DL (ref 6.4–8.2)
PROTHROMBIN TIME: 14.9 SECONDS (ref 11.8–14.2)
SODIUM SERPL-SCNC: 140 MMOL/L (ref 136–145)

## 2019-05-30 PROCEDURE — 80048 BASIC METABOLIC PNL TOTAL CA: CPT | Performed by: INTERNAL MEDICINE

## 2019-05-30 PROCEDURE — 85610 PROTHROMBIN TIME: CPT | Performed by: NURSE PRACTITIONER

## 2019-05-30 PROCEDURE — 99232 SBSQ HOSP IP/OBS MODERATE 35: CPT | Performed by: INTERNAL MEDICINE

## 2019-05-30 PROCEDURE — 85014 HEMATOCRIT: CPT | Performed by: INTERNAL MEDICINE

## 2019-05-30 PROCEDURE — 85018 HEMOGLOBIN: CPT | Performed by: INTERNAL MEDICINE

## 2019-05-30 RX ADMIN — MELATONIN 6 MG: at 21:16

## 2019-05-30 RX ADMIN — APIXABAN 2.5 MG: 2.5 TABLET, FILM COATED ORAL at 18:37

## 2019-05-30 RX ADMIN — SODIUM BICARBONATE 650 MG TABLET 1300 MG: at 09:01

## 2019-05-30 RX ADMIN — LIDOCAINE 1 PATCH: 50 PATCH CUTANEOUS at 18:37

## 2019-05-30 RX ADMIN — METHOCARBAMOL 250 MG: 500 TABLET, FILM COATED ORAL at 14:04

## 2019-05-30 RX ADMIN — APIXABAN 2.5 MG: 2.5 TABLET, FILM COATED ORAL at 09:00

## 2019-05-30 RX ADMIN — METHOCARBAMOL 250 MG: 500 TABLET, FILM COATED ORAL at 21:16

## 2019-05-30 RX ADMIN — Medication 250 MG: at 08:59

## 2019-05-30 RX ADMIN — POTASSIUM CHLORIDE 40 MEQ: 1500 TABLET, EXTENDED RELEASE ORAL at 09:00

## 2019-05-30 RX ADMIN — CHOLESTYRAMINE 4 G: 4 POWDER, FOR SUSPENSION ORAL at 18:37

## 2019-05-30 RX ADMIN — VANCOMYCIN HYDROCHLORIDE 125 MG: 5 INJECTION, POWDER, LYOPHILIZED, FOR SOLUTION INTRAVENOUS at 21:16

## 2019-05-30 RX ADMIN — LEVOTHYROXINE SODIUM 75 MCG: 75 TABLET ORAL at 05:05

## 2019-05-30 RX ADMIN — SODIUM BICARBONATE 650 MG TABLET 1300 MG: at 18:37

## 2019-05-30 RX ADMIN — VANCOMYCIN HYDROCHLORIDE 125 MG: 5 INJECTION, POWDER, LYOPHILIZED, FOR SOLUTION INTRAVENOUS at 08:58

## 2019-05-30 RX ADMIN — METHOCARBAMOL 250 MG: 500 TABLET, FILM COATED ORAL at 05:05

## 2019-05-30 RX ADMIN — VITAMIN D, TAB 1000IU (100/BT) 1000 UNITS: 25 TAB at 09:00

## 2019-05-30 RX ADMIN — CITALOPRAM HYDROBROMIDE 20 MG: 20 TABLET ORAL at 08:59

## 2019-05-30 RX ADMIN — SODIUM BICARBONATE 650 MG TABLET 1300 MG: at 12:27

## 2019-05-30 RX ADMIN — AMLODIPINE BESYLATE 10 MG: 10 TABLET ORAL at 08:59

## 2019-05-30 RX ADMIN — CHOLESTYRAMINE 4 G: 4 POWDER, FOR SUSPENSION ORAL at 08:57

## 2019-05-30 RX ADMIN — METOPROLOL TARTRATE 25 MG: 25 TABLET ORAL at 09:01

## 2019-05-31 LAB
ALBUMIN UR ELPH-MCNC: 41.6 %
ALPHA1 GLOB MFR UR ELPH: 11.7 %
ALPHA2 GLOB MFR UR ELPH: 20.9 %
ANION GAP SERPL CALCULATED.3IONS-SCNC: 8 MMOL/L (ref 4–13)
B-GLOBULIN MFR UR ELPH: 14.6 %
BASOPHILS # BLD AUTO: 0.04 THOUSANDS/ΜL (ref 0–0.1)
BASOPHILS NFR BLD AUTO: 1 % (ref 0–1)
BUN SERPL-MCNC: 30 MG/DL (ref 5–25)
CALCIUM SERPL-MCNC: 8 MG/DL (ref 8.3–10.1)
CHLORIDE SERPL-SCNC: 112 MMOL/L (ref 100–108)
CO2 SERPL-SCNC: 20 MMOL/L (ref 21–32)
CREAT SERPL-MCNC: 2.59 MG/DL (ref 0.6–1.3)
EOSINOPHIL # BLD AUTO: 0.11 THOUSAND/ΜL (ref 0–0.61)
EOSINOPHIL NFR BLD AUTO: 2 % (ref 0–6)
ERYTHROCYTE [DISTWIDTH] IN BLOOD BY AUTOMATED COUNT: 14.7 % (ref 11.6–15.1)
GAMMA GLOB MFR UR ELPH: 11.2 %
GFR SERPL CREATININE-BSD FRML MDRD: 18 ML/MIN/1.73SQ M
GLUCOSE SERPL-MCNC: 90 MG/DL (ref 65–140)
HCT VFR BLD AUTO: 30.3 % (ref 34.8–46.1)
HGB BLD-MCNC: 9.6 G/DL (ref 11.5–15.4)
IMM GRANULOCYTES # BLD AUTO: 0.5 THOUSAND/UL (ref 0–0.2)
IMM GRANULOCYTES NFR BLD AUTO: 9 % (ref 0–2)
LYMPHOCYTES # BLD AUTO: 0.72 THOUSANDS/ΜL (ref 0.6–4.47)
LYMPHOCYTES NFR BLD AUTO: 13 % (ref 14–44)
MCH RBC QN AUTO: 29.3 PG (ref 26.8–34.3)
MCHC RBC AUTO-ENTMCNC: 31.7 G/DL (ref 31.4–37.4)
MCV RBC AUTO: 92 FL (ref 82–98)
MONOCYTES # BLD AUTO: 0.34 THOUSAND/ΜL (ref 0.17–1.22)
MONOCYTES NFR BLD AUTO: 6 % (ref 4–12)
NEUTROPHILS # BLD AUTO: 3.73 THOUSANDS/ΜL (ref 1.85–7.62)
NEUTS SEG NFR BLD AUTO: 69 % (ref 43–75)
NRBC BLD AUTO-RTO: 1 /100 WBCS
PLATELET # BLD AUTO: 248 THOUSANDS/UL (ref 149–390)
PMV BLD AUTO: 10.3 FL (ref 8.9–12.7)
POTASSIUM SERPL-SCNC: 3.9 MMOL/L (ref 3.5–5.3)
PROT PATTERN UR ELPH-IMP: ABNORMAL
PROT UR-MCNC: 191 MG/DL
RBC # BLD AUTO: 3.28 MILLION/UL (ref 3.81–5.12)
SODIUM SERPL-SCNC: 140 MMOL/L (ref 136–145)
WBC # BLD AUTO: 5.44 THOUSAND/UL (ref 4.31–10.16)

## 2019-05-31 PROCEDURE — 85025 COMPLETE CBC W/AUTO DIFF WBC: CPT | Performed by: INTERNAL MEDICINE

## 2019-05-31 PROCEDURE — 99232 SBSQ HOSP IP/OBS MODERATE 35: CPT | Performed by: INTERNAL MEDICINE

## 2019-05-31 PROCEDURE — 97530 THERAPEUTIC ACTIVITIES: CPT

## 2019-05-31 PROCEDURE — 97535 SELF CARE MNGMENT TRAINING: CPT

## 2019-05-31 PROCEDURE — 97116 GAIT TRAINING THERAPY: CPT

## 2019-05-31 PROCEDURE — 80048 BASIC METABOLIC PNL TOTAL CA: CPT | Performed by: INTERNAL MEDICINE

## 2019-05-31 PROCEDURE — 97110 THERAPEUTIC EXERCISES: CPT

## 2019-05-31 RX ADMIN — LIDOCAINE 1 PATCH: 50 PATCH CUTANEOUS at 18:42

## 2019-05-31 RX ADMIN — LEVOTHYROXINE SODIUM 75 MCG: 75 TABLET ORAL at 05:33

## 2019-05-31 RX ADMIN — APIXABAN 2.5 MG: 2.5 TABLET, FILM COATED ORAL at 08:43

## 2019-05-31 RX ADMIN — VITAMIN D, TAB 1000IU (100/BT) 1000 UNITS: 25 TAB at 08:43

## 2019-05-31 RX ADMIN — SODIUM BICARBONATE 650 MG TABLET 1300 MG: at 08:43

## 2019-05-31 RX ADMIN — APIXABAN 2.5 MG: 2.5 TABLET, FILM COATED ORAL at 18:42

## 2019-05-31 RX ADMIN — CHOLESTYRAMINE 4 G: 4 POWDER, FOR SUSPENSION ORAL at 18:43

## 2019-05-31 RX ADMIN — CHOLESTYRAMINE 4 G: 4 POWDER, FOR SUSPENSION ORAL at 08:43

## 2019-05-31 RX ADMIN — METOPROLOL TARTRATE 25 MG: 25 TABLET ORAL at 08:43

## 2019-05-31 RX ADMIN — CALCITRIOL CAPSULES 0.25 MCG 0.25 MCG: 0.25 CAPSULE ORAL at 08:44

## 2019-05-31 RX ADMIN — CITALOPRAM HYDROBROMIDE 20 MG: 20 TABLET ORAL at 08:42

## 2019-05-31 RX ADMIN — VANCOMYCIN HYDROCHLORIDE 125 MG: 5 INJECTION, POWDER, LYOPHILIZED, FOR SOLUTION INTRAVENOUS at 08:50

## 2019-05-31 RX ADMIN — POTASSIUM CHLORIDE 40 MEQ: 1500 TABLET, EXTENDED RELEASE ORAL at 08:43

## 2019-05-31 RX ADMIN — SODIUM BICARBONATE 650 MG TABLET 1300 MG: at 18:42

## 2019-05-31 RX ADMIN — METHOCARBAMOL 250 MG: 500 TABLET, FILM COATED ORAL at 22:18

## 2019-05-31 RX ADMIN — METOPROLOL TARTRATE 25 MG: 25 TABLET ORAL at 20:25

## 2019-05-31 RX ADMIN — VANCOMYCIN HYDROCHLORIDE 125 MG: 5 INJECTION, POWDER, LYOPHILIZED, FOR SOLUTION INTRAVENOUS at 20:20

## 2019-05-31 RX ADMIN — Medication 250 MG: at 08:43

## 2019-05-31 RX ADMIN — METHOCARBAMOL 250 MG: 500 TABLET, FILM COATED ORAL at 15:43

## 2019-05-31 RX ADMIN — ACETAMINOPHEN 650 MG: 325 TABLET ORAL at 20:20

## 2019-05-31 RX ADMIN — MELATONIN 6 MG: at 22:18

## 2019-05-31 RX ADMIN — AMLODIPINE BESYLATE 10 MG: 10 TABLET ORAL at 08:43

## 2019-05-31 RX ADMIN — SODIUM BICARBONATE 650 MG TABLET 1300 MG: at 12:00

## 2019-05-31 RX ADMIN — METHOCARBAMOL 250 MG: 500 TABLET, FILM COATED ORAL at 05:33

## 2019-06-01 VITALS
TEMPERATURE: 98.2 F | OXYGEN SATURATION: 94 % | SYSTOLIC BLOOD PRESSURE: 167 MMHG | HEART RATE: 72 BPM | RESPIRATION RATE: 16 BRPM | HEIGHT: 60 IN | BODY MASS INDEX: 39 KG/M2 | WEIGHT: 198.63 LBS | DIASTOLIC BLOOD PRESSURE: 89 MMHG

## 2019-06-01 PROBLEM — E87.2 ACIDOSIS, METABOLIC: Status: RESOLVED | Noted: 2017-01-23 | Resolved: 2019-06-01

## 2019-06-01 PROBLEM — A41.9 SEPSIS (HCC): Status: RESOLVED | Noted: 2019-05-23 | Resolved: 2019-06-01

## 2019-06-01 PROBLEM — E87.20 ACIDOSIS, METABOLIC: Status: RESOLVED | Noted: 2017-01-23 | Resolved: 2019-06-01

## 2019-06-01 PROCEDURE — 99239 HOSP IP/OBS DSCHRG MGMT >30: CPT | Performed by: INTERNAL MEDICINE

## 2019-06-01 RX ORDER — METHOCARBAMOL 500 MG/1
250 TABLET, FILM COATED ORAL EVERY 8 HOURS SCHEDULED
Refills: 0
Start: 2019-06-01 | End: 2019-09-04 | Stop reason: ALTCHOICE

## 2019-06-01 RX ORDER — LIDOCAINE 50 MG/G
1 PATCH TOPICAL DAILY
Qty: 30 PATCH | Refills: 0
Start: 2019-06-01 | End: 2019-09-04 | Stop reason: ALTCHOICE

## 2019-06-01 RX ORDER — SODIUM BICARBONATE 650 MG/1
1300 TABLET ORAL
Qty: 180 TABLET | Refills: 0
Start: 2019-06-01 | End: 2019-07-02

## 2019-06-01 RX ORDER — OXYCODONE HYDROCHLORIDE 5 MG/1
2.5 TABLET ORAL EVERY 4 HOURS PRN
Qty: 5 TABLET | Refills: 0 | Status: SHIPPED | OUTPATIENT
Start: 2019-06-01 | End: 2019-06-04

## 2019-06-01 RX ORDER — POTASSIUM CHLORIDE 20 MEQ/1
40 TABLET, EXTENDED RELEASE ORAL DAILY
Refills: 0
Start: 2019-06-02 | End: 2019-09-13 | Stop reason: CLARIF

## 2019-06-01 RX ADMIN — Medication 250 MG: at 08:28

## 2019-06-01 RX ADMIN — POTASSIUM CHLORIDE 40 MEQ: 1500 TABLET, EXTENDED RELEASE ORAL at 08:28

## 2019-06-01 RX ADMIN — METHOCARBAMOL 250 MG: 500 TABLET, FILM COATED ORAL at 06:17

## 2019-06-01 RX ADMIN — VANCOMYCIN HYDROCHLORIDE 125 MG: 5 INJECTION, POWDER, LYOPHILIZED, FOR SOLUTION INTRAVENOUS at 08:29

## 2019-06-01 RX ADMIN — CITALOPRAM HYDROBROMIDE 20 MG: 20 TABLET ORAL at 08:27

## 2019-06-01 RX ADMIN — VITAMIN D, TAB 1000IU (100/BT) 1000 UNITS: 25 TAB at 08:27

## 2019-06-01 RX ADMIN — METOPROLOL TARTRATE 25 MG: 25 TABLET ORAL at 08:27

## 2019-06-01 RX ADMIN — LEVOTHYROXINE SODIUM 75 MCG: 75 TABLET ORAL at 06:17

## 2019-06-01 RX ADMIN — APIXABAN 2.5 MG: 2.5 TABLET, FILM COATED ORAL at 08:28

## 2019-06-01 RX ADMIN — SODIUM BICARBONATE 650 MG TABLET 1300 MG: at 12:14

## 2019-06-01 RX ADMIN — AMLODIPINE BESYLATE 10 MG: 10 TABLET ORAL at 08:27

## 2019-06-01 RX ADMIN — OXYCODONE HYDROCHLORIDE 5 MG: 5 TABLET ORAL at 08:02

## 2019-06-01 RX ADMIN — SODIUM BICARBONATE 650 MG TABLET 1300 MG: at 08:27

## 2019-06-03 ENCOUNTER — TELEPHONE (OUTPATIENT)
Dept: NEPHROLOGY | Facility: CLINIC | Age: 74
End: 2019-06-03

## 2019-06-03 DIAGNOSIS — N18.4 STAGE 4 CHRONIC KIDNEY DISEASE (HCC): Primary | ICD-10-CM

## 2019-06-04 ENCOUNTER — TELEPHONE (OUTPATIENT)
Dept: NEUROSURGERY | Facility: CLINIC | Age: 74
End: 2019-06-04

## 2019-06-08 ENCOUNTER — HOSPITAL ENCOUNTER (OUTPATIENT)
Dept: RADIOLOGY | Facility: HOSPITAL | Age: 74
Discharge: HOME | End: 2019-06-08
Payer: COMMERCIAL

## 2019-06-08 DIAGNOSIS — S22.009A THORACIC SPINE FRACTURE (HCC): ICD-10-CM

## 2019-06-08 PROCEDURE — 72100 X-RAY EXAM L-S SPINE 2/3 VWS: CPT

## 2019-06-10 ENCOUNTER — OFFICE VISIT (OUTPATIENT)
Dept: NEUROSURGERY | Facility: CLINIC | Age: 74
End: 2019-06-10
Payer: COMMERCIAL

## 2019-06-10 VITALS
HEART RATE: 68 BPM | SYSTOLIC BLOOD PRESSURE: 138 MMHG | TEMPERATURE: 98.1 F | WEIGHT: 198 LBS | DIASTOLIC BLOOD PRESSURE: 84 MMHG | BODY MASS INDEX: 38.87 KG/M2 | HEIGHT: 60 IN | RESPIRATION RATE: 16 BRPM

## 2019-06-10 DIAGNOSIS — S22.000A CLOSED COMPRESSION FRACTURE OF THORACIC VERTEBRA, INITIAL ENCOUNTER (HCC): Primary | ICD-10-CM
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
## 2019-06-10 PROCEDURE — 99213 OFFICE O/P EST LOW 20 MIN: CPT | Performed by: PHYSICIAN ASSISTANT

## 2019-06-12 ENCOUNTER — TELEPHONE (OUTPATIENT)
Dept: UROLOGY | Facility: MEDICAL CENTER | Age: 74
End: 2019-06-12

## 2019-06-21 ENCOUNTER — OFFICE VISIT (OUTPATIENT)
Dept: NEPHROLOGY | Facility: CLINIC | Age: 74
End: 2019-06-21
Payer: COMMERCIAL

## 2019-06-21 VITALS
WEIGHT: 194 LBS | DIASTOLIC BLOOD PRESSURE: 84 MMHG | SYSTOLIC BLOOD PRESSURE: 136 MMHG | BODY MASS INDEX: 38.09 KG/M2 | HEIGHT: 60 IN | HEART RATE: 70 BPM

## 2019-06-21 DIAGNOSIS — N25.81 SECONDARY HYPERPARATHYROIDISM OF RENAL ORIGIN (HCC): ICD-10-CM

## 2019-06-21 DIAGNOSIS — D45 POLYCYTHEMIA VERA (HCC): ICD-10-CM

## 2019-06-21 DIAGNOSIS — N18.4 ANEMIA IN STAGE 4 CHRONIC KIDNEY DISEASE (HCC): ICD-10-CM

## 2019-06-21 DIAGNOSIS — I10 ESSENTIAL HYPERTENSION: ICD-10-CM

## 2019-06-21 DIAGNOSIS — N18.4 STAGE 4 CHRONIC KIDNEY DISEASE (HCC): Primary | ICD-10-CM

## 2019-06-21 DIAGNOSIS — N13.9 OBSTRUCTIVE UROPATHY: ICD-10-CM

## 2019-06-21 DIAGNOSIS — D63.1 ANEMIA IN STAGE 4 CHRONIC KIDNEY DISEASE (HCC): ICD-10-CM

## 2019-06-21 DIAGNOSIS — S22.000A CLOSED COMPRESSION FRACTURE OF THORACIC VERTEBRA, INITIAL ENCOUNTER (HCC): ICD-10-CM

## 2019-06-21 PROCEDURE — 99214 OFFICE O/P EST MOD 30 MIN: CPT | Performed by: INTERNAL MEDICINE

## 2019-06-26 ENCOUNTER — APPOINTMENT (OUTPATIENT)
Dept: LAB | Facility: HOSPITAL | Age: 74
End: 2019-06-26
Attending: INTERNAL MEDICINE
Payer: COMMERCIAL

## 2019-06-26 DIAGNOSIS — N18.4 STAGE 4 CHRONIC KIDNEY DISEASE (HCC): ICD-10-CM

## 2019-06-26 LAB
ALBUMIN SERPL BCP-MCNC: 3.8 G/DL (ref 3.5–5)
ANION GAP SERPL CALCULATED.3IONS-SCNC: 8 MMOL/L (ref 4–13)
BUN SERPL-MCNC: 37 MG/DL (ref 5–25)
CALCIUM SERPL-MCNC: 9 MG/DL (ref 8.3–10.1)
CHLORIDE SERPL-SCNC: 111 MMOL/L (ref 100–108)
CO2 SERPL-SCNC: 20 MMOL/L (ref 21–32)
CREAT SERPL-MCNC: 2.95 MG/DL (ref 0.6–1.3)
ERYTHROCYTE [DISTWIDTH] IN BLOOD BY AUTOMATED COUNT: 15.2 % (ref 11.6–15.1)
GFR SERPL CREATININE-BSD FRML MDRD: 15 ML/MIN/1.73SQ M
GLUCOSE SERPL-MCNC: 94 MG/DL (ref 65–140)
HCT VFR BLD AUTO: 35.8 % (ref 34.8–46.1)
HGB BLD-MCNC: 11.2 G/DL (ref 11.5–15.4)
MCH RBC QN AUTO: 28.9 PG (ref 26.8–34.3)
MCHC RBC AUTO-ENTMCNC: 31.3 G/DL (ref 31.4–37.4)
MCV RBC AUTO: 92 FL (ref 82–98)
PHOSPHATE SERPL-MCNC: 3.5 MG/DL (ref 2.3–4.1)
PLATELET # BLD AUTO: 373 THOUSANDS/UL (ref 149–390)
PMV BLD AUTO: 10.2 FL (ref 8.9–12.7)
POTASSIUM SERPL-SCNC: 4.4 MMOL/L (ref 3.5–5.3)
RBC # BLD AUTO: 3.88 MILLION/UL (ref 3.81–5.12)
SODIUM SERPL-SCNC: 139 MMOL/L (ref 136–145)
WBC # BLD AUTO: 4.98 THOUSAND/UL (ref 4.31–10.16)

## 2019-06-26 PROCEDURE — 80069 RENAL FUNCTION PANEL: CPT

## 2019-06-26 PROCEDURE — 36415 COLL VENOUS BLD VENIPUNCTURE: CPT

## 2019-06-26 PROCEDURE — 85027 COMPLETE CBC AUTOMATED: CPT

## 2019-06-27 ENCOUNTER — TELEPHONE (OUTPATIENT)
Dept: NEPHROLOGY | Facility: CLINIC | Age: 74
End: 2019-06-27

## 2019-06-27 DIAGNOSIS — N18.4 STAGE 4 CHRONIC KIDNEY DISEASE (HCC): Primary | ICD-10-CM

## 2019-07-02 ENCOUNTER — OFFICE VISIT (OUTPATIENT)
Dept: UROLOGY | Facility: AMBULATORY SURGERY CENTER | Age: 74
End: 2019-07-02
Payer: COMMERCIAL

## 2019-07-02 VITALS
HEART RATE: 70 BPM | WEIGHT: 193 LBS | DIASTOLIC BLOOD PRESSURE: 74 MMHG | BODY MASS INDEX: 37.89 KG/M2 | SYSTOLIC BLOOD PRESSURE: 130 MMHG | HEIGHT: 60 IN

## 2019-07-02 DIAGNOSIS — N13.9 OBSTRUCTIVE UROPATHY: Primary | ICD-10-CM

## 2019-07-02 PROCEDURE — 99213 OFFICE O/P EST LOW 20 MIN: CPT | Performed by: UROLOGY

## 2019-07-02 NOTE — ASSESSMENT & PLAN NOTE
I reviewed the renal ultrasound with the patient  I was happy to inform her there is no evidence of worsening hydronephrosis  She has a functional solitary right kidney with a 8 mm stone in the lower pole  We discussed that any early symptoms of a urinary tract infection should be addressed as quickly as possible with a phone call to us so that she does not get septic again  She will continue to follow closely with nephrology for her chronic kidney disease  I will see her back in 1 year

## 2019-07-02 NOTE — PROGRESS NOTES
Assessment/Plan:    Obstructive uropathy  I reviewed the renal ultrasound with the patient  I was happy to inform her there is no evidence of worsening hydronephrosis  She has a functional solitary right kidney with a 8 mm stone in the lower pole  We discussed that any early symptoms of a urinary tract infection should be addressed as quickly as possible with a phone call to us so that she does not get septic again  She will continue to follow closely with nephrology for her chronic kidney disease  I will see her back in 1 year  Diagnoses and all orders for this visit:    Obstructive uropathy          Total visit time was 15 minutes of which over 50% was spent on counseling  Subjective:     Patient ID: Luba Braden is a 69 y o  female    61-year-old female presents for follow-up after 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 elevated bladder pressures causing hydronephrosis and kidney failure  Patient was recently hospitalized after a fall where she was found to have a UTI  Her only symptom was gross hematuria  The patient did have a back injury and is wearing a brace after the fall  She is still following closely with Nephrology and just saw him last week  She was having some problems with leakage of conduit but saw the wound ostomy nurse and this has resolved  She does occasionally have mucus coming from the conduit  She denies any further hematuria  She has no other complaints  The following portions of the patient's history were reviewed and updated as appropriate: allergies, current medications, past family history, past medical history, past social history, past surgical history and problem list     Review of Systems   Constitutional: Negative  HENT: Negative  Eyes: Negative  Respiratory: Negative  Cardiovascular: Negative  Gastrointestinal: Negative  Endocrine: Negative      Genitourinary:        As noted per HPI Musculoskeletal: Negative  Skin: Negative  Allergic/Immunologic: Negative  Neurological: Negative  Hematological: Negative  Psychiatric/Behavioral: Negative  Objective:    Physical Exam   Constitutional: She is oriented to person, place, and time  She appears well-developed and well-nourished  HENT:   Head: Normocephalic and atraumatic  Neck: Normal range of motion  Neck supple  Cardiovascular: Intact distal pulses  Pulmonary/Chest: Effort normal    Abdominal: Soft  Bowel sounds are normal  She exhibits no distension and no mass  There is no tenderness  There is no rebound and no guarding  Musculoskeletal: Normal range of motion  Neurological: She is alert and oriented to person, place, and time  Skin: Skin is warm and dry  Psychiatric: She has a normal mood and affect  Vitals reviewed          Results  No results found for: PSA  Lab Results   Component Value Date    GLUCOSE 138 10/04/2016    CALCIUM 9 0 06/26/2019     12/17/2015    K 4 4 06/26/2019    CO2 20 (L) 06/26/2019     (H) 06/26/2019    BUN 37 (H) 06/26/2019    CREATININE 2 95 (H) 06/26/2019     Lab Results   Component Value Date    WBC 4 98 06/26/2019    HGB 11 2 (L) 06/26/2019    HCT 35 8 06/26/2019    MCV 92 06/26/2019     06/26/2019       No results found for this or any previous visit (from the past 1 hour(s)) ]

## 2019-07-05 ENCOUNTER — TRANSCRIBE ORDERS (OUTPATIENT)
Dept: ADMINISTRATIVE | Age: 74
End: 2019-07-05

## 2019-07-05 ENCOUNTER — APPOINTMENT (OUTPATIENT)
Dept: RADIOLOGY | Age: 74
End: 2019-07-05
Payer: COMMERCIAL

## 2019-07-05 ENCOUNTER — TELEPHONE (OUTPATIENT)
Dept: NEUROSURGERY | Facility: CLINIC | Age: 74
End: 2019-07-05

## 2019-07-05 DIAGNOSIS — S22.000A CLOSED COMPRESSION FRACTURE OF THORACIC VERTEBRA, INITIAL ENCOUNTER (HCC): ICD-10-CM

## 2019-07-05 DIAGNOSIS — S22.000A CLOSED COMPRESSION FRACTURE OF THORACIC VERTEBRA, INITIAL ENCOUNTER (HCC): Primary | ICD-10-CM

## 2019-07-05 PROCEDURE — 72080 X-RAY EXAM THORACOLMB 2/> VW: CPT

## 2019-07-08 ENCOUNTER — OFFICE VISIT (OUTPATIENT)
Dept: NEUROSURGERY | Facility: CLINIC | Age: 74
End: 2019-07-08
Payer: COMMERCIAL

## 2019-07-08 VITALS
SYSTOLIC BLOOD PRESSURE: 140 MMHG | WEIGHT: 190 LBS | BODY MASS INDEX: 37.3 KG/M2 | DIASTOLIC BLOOD PRESSURE: 88 MMHG | HEART RATE: 68 BPM | TEMPERATURE: 98 F | HEIGHT: 60 IN

## 2019-07-08 DIAGNOSIS — M80.00XD AGE-RELATED OSTEOPOROSIS WITH CURRENT PATHOLOGICAL FRACTURE WITH ROUTINE HEALING, SUBSEQUENT ENCOUNTER: ICD-10-CM

## 2019-07-08 DIAGNOSIS — S22.080G CLOSED WEDGE COMPRESSION FRACTURE OF TWELFTH THORACIC VERTEBRA WITH DELAYED HEALING, SUBSEQUENT ENCOUNTER: Primary | ICD-10-CM

## 2019-07-08 PROCEDURE — 99213 OFFICE O/P EST LOW 20 MIN: CPT | Performed by: PHYSICIAN ASSISTANT

## 2019-07-08 NOTE — LETTER
July 8, 2019     Anita Argueta MD  [address] [place], [place] [id number]    Patient: Gia Anthony   YOB: 1945   Date of Visit: 7/8/2019       Dear Dr Suzi Mazariegos: Thank you for referring Eleanor Betancur to me for evaluation  Below are my notes for this consultation  If you have questions, please do not hesitate to call me  I look forward to following your patient along with you  Sincerely,        Evaristo Valencia PA-C        CC: No Recipients  Evaristo Valencia PA-C  7/8/2019  9:11 AM  Sign at close encounter  Assessment/Plan:    Very pleasant 71-year-old female, returns the follow-up, approximately 6 and half weeks post fall  Has history of a T12 compression fracture  She reports a pre stay at [facility], approximately 1 week, has been home almost 4 weeks  She does continue with home physical therapy and VNA visits  She arrives today with her TLSO brace in place and reports she wears it all times as directed other than bed  She reports her pain is 0-2 on a 1-10 scale and most typically achy in character, it is relieved with Tylenol which she takes infrequently  She has had updated thoracolumbar spine as requested 7/5/19, the studies carefully reviewed in detail compared with prior studies of [id number] and 5/23/19 there is some increased settling noted on this study when compared to prior studies  Overall alignment of thoracic spine however remains appropriate  No retropulsion is noted  She does report a history of osteoporosis evaluation in the past, she is on no medications at this time, based on the appearance of her for vertebrae, I am concerned for osteoporosis at this time  She has been advised to meet with her primary care provider to discuss evaluation and management per his protocol  She otherwise denies gait or balance disturbance, motor or sensory difficulties in the lower extremities, perineal anesthesia      On exam today motor of the lower extremities is 5 x 5 for power, reflexes are intact and symmetric, there is minimal tenderness to palpation/percussion over the thoracolumbar spine  Further follow-up with Neurosurgery planned in approximately 4 weeks, a repeat thoracolumbar spine study a few days prior to follow-up has been requested  She understands continue with there is restrictions, specifically lifting, pushing, pulling no greater than 10 lb, ambulation as tolerated is encouraged, she is encouraged to avoid bending  And again she is advised to wear her TLSO brace at all times when out of bed  These findings, impressions and recommendations are reviewed in great detail with the patient, she expressed understanding and agreement, her questions were answered completely and to her satisfaction  Follow up has been scheduled  Diagnoses and all orders for this visit:    Closed wedge compression fracture of twelfth thoracic vertebra with delayed healing, subsequent encounter  -     X-ray thoracolumbar spine 2 views; Future    Age-related osteoporosis with current pathological fracture with routine healing, subsequent encounter          Return in about 1 month (around 8/5/2019) for Review thoracolumbar spine study       Subjective:      Patient ID: Natasha Rucker is a 69 y o  female  HPI    The following portions of the patient's history were reviewed and updated as appropriate: allergies, current medications, past family history, past medical history, past social history and past surgical history  Review of Systems   Constitutional: Negative  HENT: Negative  Eyes: Negative  Respiratory: Negative  Cardiovascular: Negative  Gastrointestinal: Negative  Endocrine: Negative  Genitourinary: Negative  Musculoskeletal: Negative  Allergic/Immunologic: Negative  Neurological: Negative  Hematological: Negative  Psychiatric/Behavioral: Negative      All other systems reviewed and are negative  Objective:    Physical Exam   Constitutional: She is oriented to person, place, and time  She appears well-developed and well-nourished  HENT:   Head: Normocephalic and atraumatic  Eyes: Pupils are equal, round, and reactive to light  EOM are normal    Cardiovascular: Normal rate  Pulmonary/Chest: Effort normal and breath sounds normal    Neurological: She is alert and oriented to person, place, and time  Skin: Skin is warm and dry  Psychiatric: She has a normal mood and affect  Vitals reviewed  Neurologic Exam     Mental Status   Oriented to person, place, and time  Cranial Nerves     CN III, IV, VI   Pupils are equal, round, and reactive to light    Extraocular motions are normal

## 2019-07-08 NOTE — PROGRESS NOTES
Assessment/Plan:    Very pleasant 78-year-old female, returns the follow-up, approximately 6 and half weeks post fall  Has history of a T12 compression fracture  She reports a pre stay [facility], approximately 1 week, has been home almost 4 weeks  She does continue with home physical therapy and VNA visits  She arrives today with her TLSO brace in place and reports she wears it all times as directed other than bed  She reports her pain is 0-2 on a 1-10 scale and most typically achy in character, it is relieved with Tylenol which she takes infrequently  She has had updated thoracolumbar spine as requested 7/5/19, the studies carefully reviewed in detail compared with prior studies of [id number] and 5/23/19 there is some increased settling noted on this study when compared to prior studies  Overall alignment of thoracic spine however remains appropriate  No retropulsion is noted  She does report a history of osteoporosis evaluation in the past, she is on no medications at this time, based on the appearance of her for vertebrae, I am concerned for osteoporosis at this time  She has been advised to meet with her primary care provider to discuss evaluation and management per his protocol  She otherwise denies gait or balance disturbance, motor or sensory difficulties in the lower extremities, perineal anesthesia  On exam today motor of the lower extremities is 5 x 5 for power, reflexes are intact and symmetric, there is minimal tenderness to palpation/percussion over the thoracolumbar spine  Further follow-up with Neurosurgery planned in approximately 4 weeks, a repeat thoracolumbar spine study a few days prior to follow-up has been requested  She understands continue with there is restrictions, specifically lifting, pushing, pulling no greater than 10 lb, ambulation as tolerated is encouraged, she is encouraged to avoid bending      And again she is advised to wear her TLSO brace at all times when out of bed  These findings, impressions and recommendations are reviewed in great detail with the patient, she expressed understanding and agreement, her questions were answered completely and to her satisfaction  Follow up has been scheduled  Diagnoses and all orders for this visit:    Closed wedge compression fracture of twelfth thoracic vertebra with delayed healing, subsequent encounter  -     X-ray thoracolumbar spine 2 views; Future    Age-related osteoporosis with current pathological fracture with routine healing, subsequent encounter          Return in about 1 month (around 8/5/2019) for Review thoracolumbar spine study       Subjective:      Patient ID: Angie iBll is a 69 y o  female  HPI    The following portions of the patient's history were reviewed and updated as appropriate: allergies, current medications, past family history, past medical history, past social history and past surgical history  Review of Systems   Constitutional: Negative  HENT: Negative  Eyes: Negative  Respiratory: Negative  Cardiovascular: Negative  Gastrointestinal: Negative  Endocrine: Negative  Genitourinary: Negative  Musculoskeletal: Negative  Allergic/Immunologic: Negative  Neurological: Negative  Hematological: Negative  Psychiatric/Behavioral: Negative  All other systems reviewed and are negative  Objective:    Physical Exam   Constitutional: She is oriented to person, place, and time  She appears well-developed and well-nourished  HENT:   Head: Normocephalic and atraumatic  Eyes: Pupils are equal, round, and reactive to light  EOM are normal    Cardiovascular: Normal rate  Pulmonary/Chest: Effort normal and breath sounds normal    Neurological: She is alert and oriented to person, place, and time  Skin: Skin is warm and dry  Psychiatric: She has a normal mood and affect  Vitals reviewed        Neurologic Exam     Mental Status Oriented to person, place, and time  Cranial Nerves     CN III, IV, VI   Pupils are equal, round, and reactive to light    Extraocular motions are normal

## 2019-07-08 NOTE — PATIENT INSTRUCTIONS
Continue with TLSO brace at all times when out of bed and greater than 45°  Continue with restrictions, lifting, pushing, pulling no greater than 10 lb  Continue to avoid bending  Further follow-up with Neurosurgery in approximately 1 month  Plain x-ray of the thoracolumbar spine a few days prior to follow-up visit  Meet with her primary care provider to discuss osteoporosis evaluation and management as we discussed

## 2019-07-24 ENCOUNTER — TELEPHONE (OUTPATIENT)
Dept: NEPHROLOGY | Facility: CLINIC | Age: 74
End: 2019-07-24

## 2019-07-24 NOTE — TELEPHONE ENCOUNTER
A message was left in regards to scheduling for a September follow up appointment  I left our call back number

## 2019-07-27 DIAGNOSIS — D45 POLYCYTHEMIA VERA (HCC): ICD-10-CM

## 2019-07-27 DIAGNOSIS — I26.92 CHRONIC SADDLE PULMONARY EMBOLISM WITHOUT ACUTE COR PULMONALE (HCC): Chronic | ICD-10-CM

## 2019-07-27 DIAGNOSIS — I27.82 CHRONIC SADDLE PULMONARY EMBOLISM WITHOUT ACUTE COR PULMONALE (HCC): Chronic | ICD-10-CM

## 2019-07-27 RX ORDER — APIXABAN 2.5 MG/1
TABLET, FILM COATED ORAL
Qty: 60 TABLET | Refills: 5 | Status: SHIPPED | OUTPATIENT
Start: 2019-07-27 | End: 2019-08-13 | Stop reason: SDUPTHER

## 2019-08-05 ENCOUNTER — TELEPHONE (OUTPATIENT)
Dept: NEUROSURGERY | Facility: CLINIC | Age: 74
End: 2019-08-05

## 2019-08-06 ENCOUNTER — TRANSCRIBE ORDERS (OUTPATIENT)
Dept: ADMINISTRATIVE | Age: 74
End: 2019-08-06

## 2019-08-06 ENCOUNTER — APPOINTMENT (OUTPATIENT)
Dept: RADIOLOGY | Age: 74
End: 2019-08-06
Payer: COMMERCIAL

## 2019-08-06 DIAGNOSIS — S22.080G: ICD-10-CM

## 2019-08-06 DIAGNOSIS — S22.080G: Primary | ICD-10-CM

## 2019-08-06 PROCEDURE — 72080 X-RAY EXAM THORACOLMB 2/> VW: CPT

## 2019-08-08 ENCOUNTER — APPOINTMENT (OUTPATIENT)
Dept: RADIOLOGY | Age: 74
End: 2019-08-08
Payer: COMMERCIAL

## 2019-08-08 ENCOUNTER — OFFICE VISIT (OUTPATIENT)
Dept: NEUROSURGERY | Facility: CLINIC | Age: 74
End: 2019-08-08
Payer: COMMERCIAL

## 2019-08-08 VITALS
DIASTOLIC BLOOD PRESSURE: 92 MMHG | RESPIRATION RATE: 16 BRPM | WEIGHT: 199 LBS | SYSTOLIC BLOOD PRESSURE: 140 MMHG | TEMPERATURE: 98.5 F | BODY MASS INDEX: 39.07 KG/M2 | HEIGHT: 60 IN | HEART RATE: 69 BPM

## 2019-08-08 DIAGNOSIS — S22.080D CLOSED WEDGE COMPRESSION FRACTURE OF T12 VERTEBRA WITH ROUTINE HEALING: Primary | ICD-10-CM

## 2019-08-08 PROCEDURE — 99213 OFFICE O/P EST LOW 20 MIN: CPT | Performed by: PHYSICIAN ASSISTANT

## 2019-08-08 RX ORDER — SODIUM BICARBONATE 650 MG/1
TABLET ORAL
Refills: 5 | COMMUNITY
Start: 2019-07-09 | End: 2019-09-13 | Stop reason: SINTOL

## 2019-08-08 NOTE — PROGRESS NOTES
Patient ID: Sofi Coffey is a 69 y o  female  Diagnoses and all orders for this visit:    Closed wedge compression fracture of T12 vertebra with routine healing      Assessment/Plan:  Very pleasant 71-year-old female, returns for one-month follow-up, history of fall, may 2019, with T12 compression fracture  She arrives today without her brace and reports that over the last 2 weeks she has weaned out of the brace on her own  She reports she only has occasional back spasms with certain activities which she reports is unchanged from prior to her injury, she otherwise denies midline back pain in the lumbar or thoracic spine, regardless of activity or position  She denies gait or balance disturbance, motor or sensory difficulties in the lower extremities, perineal anesthesia, bowel or bladder incontinence  She has had updated imaging of the thoracolumbar spine, 8/6/19 the study is carefully reviewed in detail, and compared with prior studies 7/5/19, 6/8/19, and 5/25/19, overall alignment of thoracolumbar spine remains stable, the T12 fracture is identified, and does not appear to have any additional settling and and/or new compression  On examination today:  Motor exam of the lower extremities is 5 x 5 for power, sensation is grossly intact to light touch throughout the lower extremities, reflexes are also intact and symmetric  She is able to bend/flex at the waist to greater than 90° without evidence of discomfort or complaint, she is also able to extend her back again without evidence of discomfort  There is no pain with palpation or percussion over the thoracolumbar spine  Further follow-up with Neurosurgery will be on an as-needed basis  I did discuss a course of physical therapy and she reports she has no desired to participate in this at this time      She does have a known intercerebral meningioma and has planned follow-up with Neurosurgery in approximately 1 month to reassess, there is planned MRI of the brain prior to this visit  She also understands to meet with her primary care provider Dr Rosy Hernandez to discuss osteoporosis evaluation and management  These findings, impressions and recommendations are reviewed in great detail with the patient, she expressed understanding and agreement, her questions were answered completely and to her satisfaction  Follow up has been scheduled  Return in about 4 weeks (around 9/5/2019) for Meningioma follow-up, review MRI brain as scheduled  Chief Complaint  One-month follow-up, history of thoracic compression fracture  HPI       The following portions of the patient's history were reviewed and updated as appropriate: allergies, current medications, past family history, past medical history, past social history and past surgical history  Review of Systems   Constitutional: Negative  HENT: Negative  Eyes: Negative  Respiratory: Negative  Cardiovascular: Negative  Gastrointestinal: Negative  Endocrine: Negative  Genitourinary: Negative  Musculoskeletal: Negative  Allergic/Immunologic: Negative  Neurological: Negative  Hematological: Negative  Psychiatric/Behavioral: Negative  All other systems reviewed and are negative  Objective:    Physical Exam   Constitutional: She is oriented to person, place, and time  She appears well-developed and well-nourished  HENT:   Head: Normocephalic and atraumatic  Eyes: Pupils are equal, round, and reactive to light  EOM are normal    Cardiovascular: Normal rate  Pulmonary/Chest: Effort normal and breath sounds normal    Neurological: She is alert and oriented to person, place, and time  Skin: Skin is warm and dry  Psychiatric: She has a normal mood and affect  Vitals reviewed  Neurologic Exam     Mental Status   Oriented to person, place, and time  Cranial Nerves     CN III, IV, VI   Pupils are equal, round, and reactive to light    Extraocular motions are normal           THORACOLUMBAR SPINE   8/6/19     INDICATION:   S22 080G: Wedge compression fracture of t11-T12 vertebra, subsequent encounter for fracture with delayed healing      COMPARISON: Thoracolumbar radiographs 7/5/2019     VIEWS:  XR SPINE THORACOLUMBAR 2 VW        FINDINGS:     Stable moderate compression fracture of T12 alignment for differences in technique and projection      Mild dextroconvex lower thoracic scoliosis unchanged      Mild multilevel thoracolumbar degenerative changes       There is no displacement of the paraspinal line       The pedicles appear intact      Surgical clips right upper quadrant of the abdomen attributed to prior cholecystectomy  Tortuous thoracic aorta      IMPRESSION:     Stable moderate compression fracture of T12

## 2019-08-08 NOTE — LETTER
August 8, 2019     Misty Landers MD  [address] [place] [address]    Patient: Johnathon Santillan   YOB: 1945   Date of Visit: 8/8/2019       Dear Dr Sebastian Hong: Thank you for referring Davis Valiente to me for evaluation  Below are my notes for this consultation  If you have questions, please do not hesitate to call me  I look forward to following your patient along with you  Sincerely,        Clarence Severino PA-C        CC: No Recipients  Clarence Severino PA-C  8/8/2019  9:21 AM  Sign at close encounter  Patient ID: Johnathon Santillan is a 69 y o  female  Diagnoses and all orders for this visit:    Closed wedge compression fracture of T12 vertebra with routine healing      Assessment/Plan:  Very pleasant 67-year-old female, returns for one-month follow-up, history of fall, may 2019, with T12 compression fracture  She arrives today without her brace and reports that over the last 2 weeks she has weaned out of the brace on her own  She reports she only has occasional back spasms with certain activities which she reports is unchanged from prior to her injury, she otherwise denies midline back pain in the lumbar or thoracic spine, regardless of activity or position  She denies gait or balance disturbance, motor or sensory difficulties in the lower extremities, perineal anesthesia, bowel or bladder incontinence  She has had updated imaging of the thoracolumbar spine, 8/6/19 the study is carefully reviewed in detail, and compared with prior studies 7/5/19, 6/8/19, and 5/25/19, overall alignment of thoracolumbar spine remains stable, the T12 fracture is identified, and does not appear to have any additional settling and and/or new compression  On examination today:  Motor exam of the lower extremities is 5 x 5 for power, sensation is grossly intact to light touch throughout the lower extremities, reflexes are also intact and symmetric    She is able to bend/flex at the waist to greater than 90° without evidence of discomfort or complaint, she is also able to extend her back again without evidence of discomfort  There is no pain with palpation or percussion over the thoracolumbar spine  Further follow-up with Neurosurgery will be on an as-needed basis  I did discuss a course of physical therapy and she reports she has no desired to participate in this at this time  She does have a known intercerebral meningioma and has planned follow-up with Neurosurgery in approximately 1 month to reassess, there is planned MRI of the brain prior to this visit  She also understands to meet with her primary care provider Dr Rosemarie Ramos to discuss osteoporosis evaluation and management  These findings, impressions and recommendations are reviewed in great detail with the patient, she expressed understanding and agreement, her questions were answered completely and to her satisfaction  Follow up has been scheduled  Return in about 4 weeks (around 9/5/2019) for Meningioma follow-up, review MRI brain as scheduled  Chief Complaint  One-month follow-up, history of thoracic compression fracture  HPI       The following portions of the patient's history were reviewed and updated as appropriate: allergies, current medications, past family history, past medical history, past social history and past surgical history  Review of Systems   Constitutional: Negative  HENT: Negative  Eyes: Negative  Respiratory: Negative  Cardiovascular: Negative  Gastrointestinal: Negative  Endocrine: Negative  Genitourinary: Negative  Musculoskeletal: Negative  Allergic/Immunologic: Negative  Neurological: Negative  Hematological: Negative  Psychiatric/Behavioral: Negative  All other systems reviewed and are negative  Objective:    Physical Exam   Constitutional: She is oriented to person, place, and time   She appears well-developed and well-nourished  HENT:   Head: Normocephalic and atraumatic  Eyes: Pupils are equal, round, and reactive to light  EOM are normal    Cardiovascular: Normal rate  Pulmonary/Chest: Effort normal and breath sounds normal    Neurological: She is alert and oriented to person, place, and time  Skin: Skin is warm and dry  Psychiatric: She has a normal mood and affect  Vitals reviewed  Neurologic Exam     Mental Status   Oriented to person, place, and time  Cranial Nerves     CN III, IV, VI   Pupils are equal, round, and reactive to light  Extraocular motions are normal           THORACOLUMBAR SPINE   8/6/19     INDICATION:   S22 080G: Wedge compression fracture of t11-T12 vertebra, subsequent encounter for fracture with delayed healing      COMPARISON: Thoracolumbar radiographs 7/5/2019     VIEWS:  XR SPINE THORACOLUMBAR 2 VW        FINDINGS:     Stable moderate compression fracture of T12 alignment for differences in technique and projection      Mild dextroconvex lower thoracic scoliosis unchanged      Mild multilevel thoracolumbar degenerative changes       There is no displacement of the paraspinal line       The pedicles appear intact      Surgical clips right upper quadrant of the abdomen attributed to prior cholecystectomy  Tortuous thoracic aorta      IMPRESSION:     Stable moderate compression fracture of T12

## 2019-08-10 DIAGNOSIS — N18.4 CKD (CHRONIC KIDNEY DISEASE), STAGE IV (HCC): Primary | ICD-10-CM

## 2019-08-11 RX ORDER — SODIUM BICARBONATE 650 MG/1
650 TABLET ORAL 2 TIMES DAILY
Qty: 60 TABLET | Refills: 5 | Status: SHIPPED | OUTPATIENT
Start: 2019-08-11 | End: 2019-09-13 | Stop reason: SINTOL

## 2019-08-12 DIAGNOSIS — D45 POLYCYTHEMIA VERA (HCC): ICD-10-CM

## 2019-08-12 RX ORDER — RUXOLITINIB 10 MG/1
10 TABLET ORAL DAILY
Qty: 30 TABLET | Refills: 5 | Status: SHIPPED | OUTPATIENT
Start: 2019-08-12 | End: 2020-02-12 | Stop reason: SDUPTHER

## 2019-08-13 DIAGNOSIS — I26.92 CHRONIC SADDLE PULMONARY EMBOLISM WITHOUT ACUTE COR PULMONALE (HCC): Chronic | ICD-10-CM

## 2019-08-13 DIAGNOSIS — D45 POLYCYTHEMIA VERA (HCC): ICD-10-CM

## 2019-08-13 DIAGNOSIS — I27.82 CHRONIC SADDLE PULMONARY EMBOLISM WITHOUT ACUTE COR PULMONALE (HCC): Chronic | ICD-10-CM

## 2019-08-27 ENCOUNTER — HOSPITAL ENCOUNTER (OUTPATIENT)
Dept: RADIOLOGY | Facility: HOSPITAL | Age: 74
Discharge: HOME | End: 2019-08-27
Payer: COMMERCIAL

## 2019-08-27 ENCOUNTER — TELEPHONE (OUTPATIENT)
Dept: UROLOGY | Facility: MEDICAL CENTER | Age: 74
End: 2019-08-27

## 2019-08-27 DIAGNOSIS — D33.0 BENIGN NEOPLASM OF SUPRATENTORIAL REGION OF BRAIN (HCC): ICD-10-CM

## 2019-08-27 DIAGNOSIS — D32.9 MENINGIOMA (HCC): ICD-10-CM

## 2019-08-27 DIAGNOSIS — R90.89 ABNORMAL FINDING ON MRI OF BRAIN: ICD-10-CM

## 2019-08-27 DIAGNOSIS — C67.9 MALIGNANT NEOPLASM OF URINARY BLADDER, UNSPECIFIED SITE (HCC): Primary | ICD-10-CM

## 2019-08-27 PROCEDURE — 70551 MRI BRAIN STEM W/O DYE: CPT

## 2019-08-27 NOTE — TELEPHONE ENCOUNTER
Patient of Dr Ubaldo Khan seen in [place]  Called regarding need for Medical Records to be faxed to the [facility]  Their fax number is [phone]  Attention to [place], please  The patient can be reached at [phone]    Thank you

## 2019-08-27 NOTE — TELEPHONE ENCOUNTER
Patient of Dr Sunny Morel seen in [facility] is in need of a referral to the [facility]  The fax number for that clinic is [phone], attention to Kelle Maguire or Yoanna Conklin    Thank you

## 2019-08-27 NOTE — TELEPHONE ENCOUNTER
Patient stated that she needs a referral from us stating that she needs wound care due to her stoma since it has been leaking  They had helped her 6 months ago with the same kind of issue    Please advise

## 2019-08-27 NOTE — TELEPHONE ENCOUNTER
I called the patient and she informed me that she wasn't in need of records, she just needs a referral of some sort from our office to refer her to Wound Care  Can you forward this to whoever would handle something like this please?

## 2019-08-29 NOTE — TELEPHONE ENCOUNTER
Name, demographics, & ICD-10 code Needed  Patient being seen at 13:00 today at [address] [facility]   Asking for this information to be faxed over promptly to [phone] Attn: Ammon Monterroso

## 2019-09-04 ENCOUNTER — OFFICE VISIT (OUTPATIENT)
Dept: NEUROSURGERY | Facility: CLINIC | Age: 74
End: 2019-09-04
Payer: COMMERCIAL

## 2019-09-04 VITALS
WEIGHT: 201 LBS | TEMPERATURE: 98 F | RESPIRATION RATE: 16 BRPM | HEIGHT: 60 IN | HEART RATE: 70 BPM | DIASTOLIC BLOOD PRESSURE: 78 MMHG | SYSTOLIC BLOOD PRESSURE: 138 MMHG | BODY MASS INDEX: 39.46 KG/M2

## 2019-09-04 DIAGNOSIS — D33.0 BENIGN NEOPLASM OF SUPRATENTORIAL REGION OF BRAIN (HCC): ICD-10-CM

## 2019-09-04 DIAGNOSIS — D32.9 MENINGIOMA (HCC): ICD-10-CM

## 2019-09-04 DIAGNOSIS — R90.89 ABNORMAL FINDING ON MRI OF BRAIN: Primary | ICD-10-CM

## 2019-09-04 PROCEDURE — 99213 OFFICE O/P EST LOW 20 MIN: CPT | Performed by: NEUROLOGICAL SURGERY

## 2019-09-04 NOTE — LETTER
September 4, 2019     Latia Smith MD  [address] [place], [place] [id number]    Patient: Angie Tenorio   YOB: 1945   Date of Visit: 9/4/2019       Dear Dr Medardo Winston: Thank you for referring Danita Mckeon to me for evaluation  Below are my notes for this consultation  If you have questions, please do not hesitate to call me  I look forward to following your patient along with you  Sincerely,        Doug Berumen MD        CC: No Recipients  Tatyana Russo PA-C  9/4/2019  8:46 AM  Sign at close encounter  Patient ID: Angie Tenorio is a 69 y o  female  Diagnoses and all orders for this visit:    Abnormal finding on MRI of brain  -     MRI brain without contrast; Future    Benign neoplasm of supratentorial region of brain ([address] )  -     MRI brain without contrast; Future    Meningioma (HCC)  -     MRI brain without contrast; Future          Assessment/Plan:  Very pleasant 71-year-old female, returns for approximate 6 months follow-up, to review MRI of the brain  She has a history of a fall with a subdural hematoma 1/11/19, as part of her evaluation she underwent CT scan and note was made of a right trigone region partially calcified mass thought to be an intraventricular meningioma  This was further evaluated with an MRI of the brain 2/14/19  She returns today and offers no new complaints  She does have history of recent fall with a T12 compression fracture she reports minimal to no back pain other than occasionally on certain days, which she thinks may be related to weather  She otherwise denies gait or balance disturbance, motor or sensory difficulties in the upper lower extremities, bowel or bladder incontinence, difficulty with memory or mentation, difficulty with executive function  She does have known stage 4 chronic kidney disease, her most recent renal function panel reveals a GFR 15, this precludes the use of contrast media in MRI      She has had updated MRI, 8/27/19, the study was carefully reviewed in detail by Dr Winston Anthony, and compared with prior studies, MRI brain of 1/29/19, and CT head 1/11/19, the prior identified approximate 9 x 11 mm partially calcified right trigone region mass, thought to represent a intraventricular meningioma, remains essentially unchanged  Once again there is no evidence of hydrocephalus  On examination today there are no focal neurologic deficits identified: There is no pronator drift, rapid alternating movements are intact, finger-nose is intact, Romberg is negative, motor exam of the upper and lower extremities is 5 x 5 for power, reflexes are intact and symmetric for the upper lower extremities, sensation is also grossly intact  Cranial nerves grossly intact  In addition she was nontender with palpation or percussion over the thoracolumbar spine, and had no evidence of increased pain with flexion or extension of the thoracolumbar spine  Further follow-up per NCCN guidelines in a 6-12 month interval is advised and the patient has elected 12 months for repeat MRI and clinical visit  Once again this will be a noncontrast study secondary to her chronic renal disease  She may continue with all usual activities without restriction from a neurosurgical perspective  Continue follow-up with renal, as well as her primary care provider is advised  These findings, impressions and recommendations are reviewed in great detail with the patient, she expressed understanding and agreement, her questions were answered completely and to her satisfaction  Follow up has been scheduled  Return in about 1 year (around 9/4/2020) for Review MRI brain without contrast     Chief Complaint  Six-month follow-up to review MRI of the brain      HPI       The following portions of the patient's history were reviewed and updated as appropriate: allergies, current medications, past family history, past medical history, past social history and past surgical history  Review of Systems   Constitutional: Negative  HENT: Negative  Eyes: Negative  Respiratory: Negative  Cardiovascular: Negative  Gastrointestinal: Negative  Endocrine: Negative  Genitourinary: Negative  Musculoskeletal: Negative  Allergic/Immunologic: Negative  Neurological: Negative  Hematological: Negative  Psychiatric/Behavioral: Negative  All other systems reviewed and are negative  Objective:    Physical Exam   Constitutional: She is oriented to person, place, and time  She appears well-developed and well-nourished  HENT:   Head: Normocephalic and atraumatic  Eyes: Pupils are equal, round, and reactive to light  EOM are normal    Cardiovascular: Normal rate  Pulmonary/Chest: Effort normal and breath sounds normal    Neurological: She is alert and oriented to person, place, and time  Skin: Skin is warm and dry  Psychiatric: She has a normal mood and affect  Vitals reviewed  Neurologic Exam     Mental Status   Oriented to person, place, and time  Cranial Nerves     CN III, IV, VI   Pupils are equal, round, and reactive to light  Extraocular motions are normal             MRI BRAIN WITHOUT CONTRAST    8/27/19     INDICATION: : 69-year-old female, follow-up meningioma     COMPARISON:   2/14/2019 MRI     TECHNIQUE:  Sagittal T1, axial T2, axial FLAIR, axial T1, axial [place] and axial diffusion imaging      IMAGE QUALITY:  Diagnostic      FINDINGS:   BRAIN PARENCHYMA:   Moderate chronic microvascular ischemic changes are present within the subcortical and deep white matter of the frontal and parietal lobes bilaterally        No acute ischemic disease is identified         Age-appropriate cerebral atrophy is present       Persistent partially calcified mass measuring approximately 9 mm x 11 mm is present in the region of the trigone of the right lateral ventricle    The imaging characteristics remain consistent with meningioma  Cavernous angioma considered less likely  Findings are stable  No evidence of hydrocephalus       There is no discrete mass, mass effect or midline shift  No hemorrhage    Brainstem and cerebellum demonstrate normal signal   Diffusion imaging is unremarkable      VENTRICLES: The ventricles are normal in size and contour      SELLA AND PITUITARY GLAND:  Normal      ORBITS:  Normal      PARANASAL SINUSES:  The paranasal sinuses are clear      VASCULATURE:  Evaluation of the major intracranial vasculature demonstrates appropriate flow voids      CALVARIUM AND SKULL BASE: Normal      EXTRACRANIAL SOFT TISSUES: Normal      IMPRESSION:  Moderate chronic microvascular ischemic disease     No acute ischemic disease     Age-related atrophy     Stable appearance right trigone region 9 mm x 11 mm partially calcified mass most consistent with intraventricular meningioma      Similar to prior MRI study

## 2019-09-04 NOTE — PATIENT INSTRUCTIONS
Continue with all usual activities  Further follow-up with Neurosurgery in approximately 1 year, Dr Zak Chiang [facility])    MRI brain without contrast week or so prior to follow-up visit  Continue with regular exercise regimen for core strengthening and range of motion, relative to prior back injury  Call and return to Neurosurgery should there be any new changes

## 2019-09-04 NOTE — PROGRESS NOTES
Patient ID: Keri Paez is a 69 y o  female  Diagnoses and all orders for this visit:    Abnormal finding on MRI of brain  -     MRI brain without contrast; Future    Benign neoplasm of supratentorial region of brain ([address] )  -     MRI brain without contrast; Future    Meningioma (HCC)  -     MRI brain without contrast; Future          Assessment/Plan:  Very pleasant 81-year-old female, returns for approximate 6 months follow-up, to review MRI of the brain  She has a history of a fall with a subdural hematoma 1/11/19, as part of her evaluation she underwent CT scan and note was made of a right trigone region partially calcified mass thought to be an intraventricular meningioma  This was further evaluated with an MRI of the brain 2/14/19  She returns today and offers no new complaints  She does have history of recent fall with a T12 compression fracture she reports minimal to no back pain other than occasionally on certain days, which she thinks may be related to weather  She otherwise denies gait or balance disturbance, motor or sensory difficulties in the upper lower extremities, bowel or bladder incontinence, difficulty with memory or mentation, difficulty with executive function  She does have known stage 4 chronic kidney disease, her most recent renal function panel reveals a GFR 15, this precludes the use of contrast media in MRI  She has had updated MRI, 8/27/19, the study was carefully reviewed in detail by Dr Torri Larkin, and compared with prior studies, MRI brain of 1/29/19, and CT head 1/11/19, the prior identified approximate 9 x 11 mm partially calcified right trigone region mass, thought to represent a intraventricular meningioma, remains essentially unchanged  Once again there is no evidence of hydrocephalus  On examination today there are no focal neurologic deficits identified:   There is no pronator drift, rapid alternating movements are intact, finger-nose is intact, Romberg is negative, motor exam of the upper and lower extremities is 5 x 5 for power, reflexes are intact and symmetric for the upper lower extremities, sensation is also grossly intact  Cranial nerves grossly intact  In addition she was nontender with palpation or percussion over the thoracolumbar spine, and had no evidence of increased pain with flexion or extension of the thoracolumbar spine  Further follow-up per NCCN guidelines in a 6-12 month interval is advised and the patient has elected 12 months for repeat MRI and clinical visit  Once again this will be a noncontrast study secondary to her chronic renal disease  She may continue with all usual activities without restriction from a neurosurgical perspective  Continue follow-up with renal, as well as her primary care provider is advised  These findings, impressions and recommendations are reviewed in great detail with the patient, she expressed understanding and agreement, her questions were answered completely and to her satisfaction  Follow up has been scheduled  Return in about 1 year (around 9/4/2020) for Review MRI brain without contrast     Chief Complaint  Six-month follow-up to review MRI of the brain  HPI       The following portions of the patient's history were reviewed and updated as appropriate: allergies, current medications, past family history, past medical history, past social history and past surgical history  Review of Systems   Constitutional: Negative  HENT: Negative  Eyes: Negative  Respiratory: Negative  Cardiovascular: Negative  Gastrointestinal: Negative  Endocrine: Negative  Genitourinary: Negative  Musculoskeletal: Negative  Allergic/Immunologic: Negative  Neurological: Negative  Hematological: Negative  Psychiatric/Behavioral: Negative  All other systems reviewed and are negative        Objective:    Physical Exam   Constitutional: She is oriented to person, place, and time  She appears well-developed and well-nourished  HENT:   Head: Normocephalic and atraumatic  Eyes: Pupils are equal, round, and reactive to light  EOM are normal    Cardiovascular: Normal rate  Pulmonary/Chest: Effort normal and breath sounds normal    Neurological: She is alert and oriented to person, place, and time  Skin: Skin is warm and dry  Psychiatric: She has a normal mood and affect  Vitals reviewed  Neurologic Exam     Mental Status   Oriented to person, place, and time  Cranial Nerves     CN III, IV, VI   Pupils are equal, round, and reactive to light  Extraocular motions are normal             MRI BRAIN WITHOUT CONTRAST    8/27/19     INDICATION: : 71-year-old female, follow-up meningioma     COMPARISON:   2/14/2019 MRI     TECHNIQUE:  Sagittal T1, axial T2, axial FLAIR, axial T1, axial [place] and axial diffusion imaging      IMAGE QUALITY:  Diagnostic      FINDINGS:   BRAIN PARENCHYMA:   Moderate chronic microvascular ischemic changes are present within the subcortical and deep white matter of the frontal and parietal lobes bilaterally        No acute ischemic disease is identified         Age-appropriate cerebral atrophy is present       Persistent partially calcified mass measuring approximately 9 mm x 11 mm is present in the region of the trigone of the right lateral ventricle  The imaging characteristics remain consistent with meningioma  Cavernous angioma considered less likely  Findings are stable  No evidence of hydrocephalus       There is no discrete mass, mass effect or midline shift  No hemorrhage    Brainstem and cerebellum demonstrate normal signal   Diffusion imaging is unremarkable      VENTRICLES: The ventricles are normal in size and contour      SELLA AND PITUITARY GLAND:  Normal      ORBITS:  Normal      PARANASAL SINUSES:  The paranasal sinuses are clear      VASCULATURE:  Evaluation of the major intracranial vasculature demonstrates appropriate flow voids      CALVARIUM AND SKULL BASE: Normal      EXTRACRANIAL SOFT TISSUES: Normal      IMPRESSION:  Moderate chronic microvascular ischemic disease     No acute ischemic disease     Age-related atrophy     Stable appearance right trigone region 9 mm x 11 mm partially calcified mass most consistent with intraventricular meningioma      Similar to prior MRI study

## 2019-09-05 ENCOUNTER — DOCUMENTATION (OUTPATIENT)
Dept: OTHER | Facility: HOSPITAL | Age: 74
End: 2019-09-05

## 2019-09-10 ENCOUNTER — APPOINTMENT (OUTPATIENT)
Dept: LAB | Facility: HOSPITAL | Age: 74
End: 2019-09-10
Attending: INTERNAL MEDICINE
Payer: COMMERCIAL

## 2019-09-10 DIAGNOSIS — N18.4 STAGE 4 CHRONIC KIDNEY DISEASE (HCC): ICD-10-CM

## 2019-09-10 LAB
ALBUMIN SERPL BCP-MCNC: 3.6 G/DL (ref 3.5–5)
ANION GAP SERPL CALCULATED.3IONS-SCNC: 6 MMOL/L (ref 4–13)
BUN SERPL-MCNC: 46 MG/DL (ref 5–25)
CALCIUM SERPL-MCNC: 8.6 MG/DL (ref 8.3–10.1)
CHLORIDE SERPL-SCNC: 110 MMOL/L (ref 100–108)
CO2 SERPL-SCNC: 22 MMOL/L (ref 21–32)
CREAT SERPL-MCNC: 3.37 MG/DL (ref 0.6–1.3)
ERYTHROCYTE [DISTWIDTH] IN BLOOD BY AUTOMATED COUNT: 14.6 % (ref 11.6–15.1)
GFR SERPL CREATININE-BSD FRML MDRD: 13 ML/MIN/1.73SQ M
GLUCOSE P FAST SERPL-MCNC: 83 MG/DL (ref 65–99)
HCT VFR BLD AUTO: 38.8 % (ref 34.8–46.1)
HGB BLD-MCNC: 12.1 G/DL (ref 11.5–15.4)
MCH RBC QN AUTO: 29.7 PG (ref 26.8–34.3)
MCHC RBC AUTO-ENTMCNC: 31.2 G/DL (ref 31.4–37.4)
MCV RBC AUTO: 95 FL (ref 82–98)
PHOSPHATE SERPL-MCNC: 4.2 MG/DL (ref 2.3–4.1)
PLATELET # BLD AUTO: 310 THOUSANDS/UL (ref 149–390)
PMV BLD AUTO: 10.7 FL (ref 8.9–12.7)
POTASSIUM SERPL-SCNC: 4.4 MMOL/L (ref 3.5–5.3)
PTH-INTACT SERPL-MCNC: 219.8 PG/ML (ref 18.4–80.1)
RBC # BLD AUTO: 4.08 MILLION/UL (ref 3.81–5.12)
SODIUM SERPL-SCNC: 138 MMOL/L (ref 136–145)
WBC # BLD AUTO: 4.1 THOUSAND/UL (ref 4.31–10.16)

## 2019-09-10 PROCEDURE — 80069 RENAL FUNCTION PANEL: CPT

## 2019-09-10 PROCEDURE — 85027 COMPLETE CBC AUTOMATED: CPT

## 2019-09-10 PROCEDURE — 83970 ASSAY OF PARATHORMONE: CPT

## 2019-09-10 PROCEDURE — 36415 COLL VENOUS BLD VENIPUNCTURE: CPT

## 2019-09-13 ENCOUNTER — OFFICE VISIT (OUTPATIENT)
Dept: NEPHROLOGY | Facility: CLINIC | Age: 74
End: 2019-09-13
Payer: COMMERCIAL

## 2019-09-13 VITALS
WEIGHT: 200.8 LBS | HEART RATE: 82 BPM | SYSTOLIC BLOOD PRESSURE: 148 MMHG | DIASTOLIC BLOOD PRESSURE: 85 MMHG | HEIGHT: 60 IN | BODY MASS INDEX: 39.42 KG/M2

## 2019-09-13 DIAGNOSIS — I10 ESSENTIAL HYPERTENSION: ICD-10-CM

## 2019-09-13 DIAGNOSIS — R60.9 EDEMA, UNSPECIFIED TYPE: ICD-10-CM

## 2019-09-13 DIAGNOSIS — N18.4 CKD (CHRONIC KIDNEY DISEASE) STAGE 4, GFR 15-29 ML/MIN (HCC): ICD-10-CM

## 2019-09-13 DIAGNOSIS — N18.4 CKD (CHRONIC KIDNEY DISEASE), STAGE IV (HCC): Primary | ICD-10-CM

## 2019-09-13 DIAGNOSIS — N13.9 OBSTRUCTIVE UROPATHY: ICD-10-CM

## 2019-09-13 DIAGNOSIS — N25.81 SECONDARY HYPERPARATHYROIDISM OF RENAL ORIGIN (HCC): ICD-10-CM

## 2019-09-13 PROCEDURE — 99214 OFFICE O/P EST MOD 30 MIN: CPT | Performed by: INTERNAL MEDICINE

## 2019-09-13 NOTE — PATIENT INSTRUCTIONS
Stop taking sodium bicarbonate for now  Stop taking amlodipine  Increase dose of metoprolol to 1 tablet twice a day, (previously you will taking half a tablet twice a day)  I want you to have a repeat blood test (BMP) in 4 weeks, will contact you with the results  If your kidney function does not improve will send you to vascular surgeon evaluation for dialysis access placement  Follow low-salt diet  Okay to take Tylenol as needed for pain  I would like to see you back in the office in 2-3 months with another blood test   If you developed any worsening symptoms as increased lower extremity edema, decreased urine output, nausea, vomiting, increased shortness of breath, or any acute changes, please go to the emergency department for urgent evaluation  Keep saving nondominant arm (left arm) in anticipation of dialysis access placement in the near future, no intravenously lines or blood draws over the left arm as much as possible

## 2019-09-13 NOTE — PROGRESS NOTES
OFFICE FOLLOW UP - Nephrology   Rory Boeck 69 y o  female MRN: [id number]       ASSESSMENT and PLAN:  Tracee Aguiar was seen today for follow-up and chronic kidney disease  Diagnoses and all orders for this visit:    CKD (chronic kidney disease), stage IV ([facility])  -     metoprolol tartrate (LOPRESSOR) 25 mg tablet; Take 1 tablet (25 mg total) by mouth 2 (two) times a day  -     Renal function panel; Future  -     PTH, intact; Future  -     Basic metabolic panel; Future    Edema, unspecified type    Secondary hyperparathyroidism of renal origin ([facility] [address] )    CKD (chronic kidney disease) stage 4, GFR 15-29 ml/min ([facility])    Obstructive uropathy    Essential hypertension        This is a 52-year-old lady with known history of chronic kidney disease stage 4 with previous creatinine in the high 2, history of urinary incontinence, bilateral hydronephrosis secondary to obstructive uropathy and 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 in October 2016 who returns to the office for follow-up       1  Worsening chronic kidney disease stage 4  Currently patient is asymptomatic, no uremic symptoms  Most recent creatinine 3 37 with an estimated GFR of 13  She have worsening lower extremity edema  Given that her most recent serum bicarbonate is normal I will stop sodium bicarbonate at this moment as well as the amlodipine, will increase dose of metoprolol  I would like to repeat a BMP in 1 month, if renal function does not improve will refer her to vascular surgeon for AV fistula placed  I would like to see her back in the office in 3 months with repeat labs  Keep saving nondominant arm (left arm) in anticipation of dialysis access in the future, no intravenously lines or blood draws over the left arm  Avoid NSAIDs  Follow low-salt diet    I have discussed with patient about her worsening kidney function and the possibility that we may need to proceed with dialysis access placement soon, she agrees and understands the plan  She went to [employer] classes in the past       2  Hypertension, blood pressure elevated, given worsening lower extremity edema will stop amlodipine and increase dose of metoprolol  Given his stable serum bicarbonate will stop sodium bicarbonate dose  3  History of nonfunctional bladder causing bilateral hydronephrosis status post supra trigonal cystectomy and ileal conduit 10/2016, continue follow-up with urologist Dr Riddhi Grant  4  Polycythemia area, previous history of PE, on Eliquis, continue follow-up with Hematology follow-up, Dr Shanice Watt  Most recent hemoglobin 12 1, will follow CBC  5  Mineral bone disease, PTH elevated but acceptable, continue with Calcitrol 1 tablet 3 times a week (Monday, Wednesday, Friday) follow labs in 3 months  6  Metabolic acidosis in the setting of advanced chronic kidney disease, most recent serum bicarbonate 22, given worsening lower extremity edema will stop sodium bicarbonate tablets for now and follow labs in 1 month  Patient Instructions   Stop taking sodium bicarbonate for now  Stop taking amlodipine  Increase dose of metoprolol to 1 tablet twice a day, (previously you will taking half a tablet twice a day)  I want you to have a repeat blood test (BMP) in 4 weeks, will contact you with the results  If your kidney function does not improve will send you to vascular surgeon evaluation for dialysis access placement  Follow low-salt diet  Okay to take Tylenol as needed for pain  I would like to see you back in the office in 2-3 months with another blood test   If you developed any worsening symptoms as increased lower extremity edema, decreased urine output, nausea, vomiting, increased shortness of breath, or any acute changes, please go to the emergency department for urgent evaluation    Keep saving nondominant arm (left arm) in anticipation of dialysis access placement in the near future, no intravenously lines or blood draws over the left arm as much as possible  HPI: Keri Paez is a 69 y o  female who is here for Follow-up and Chronic Kidney Disease    Hospitalized in October 2018 due to a small bowel obstruction that resolved spontaneously  Hospitalized early January 2019 status post fall, found to have a left-sided subdural hematoma, creatinine on admission 3 0 that decreased to 2 5 after intravenously fluids  Hospitalized May 2019 after status post fall complicated with T-spine compression fracture, found to have sepsis secondary to UTI, also developed acute kidney injury on top of CKD, creatinine on admission was 3 68, renal function improved and creatinine went down to 2 59 on discharge day  Last time seen in our office in June 21, 2019  Today returns to the office for 3 month follow-up    In general doing okay  Patient currently has no complaints at this time other than lower back pain with certain position  Patient denies any chest pain, shortness of breath, noticed worsening leg swelling  Appetite is stable, no nausea or vomiting, energy level unchanged  No changes on the amount of urine output through ileal conduit, no gross hematuria  No recent worsening diarrhea  Denies any NSAID use  ROS:   All the systems were reviewed and were negative except as documented on the HPI  Allergies: Chlorhexidine    Medications:   Current Outpatient Medications:     acetaminophen (TYLENOL) 325 mg tablet, 650 mg every 6 hours as needed for mild pain or headache (Patient taking differently: as needed 650 mg every 6 hours as needed for mild pain or headache), Disp: 30 tablet, Rfl: 0    amLODIPine (NORVASC) 10 mg tablet, Take 10 mg by mouth daily, Disp: , Rfl:     apixaban (ELIQUIS) 2 5 mg, Take 1 tablet (2 5 mg total) by mouth 2 (two) times a day, Disp: 60 tablet, Rfl: 5    Cholecalciferol (VITAMIN D3) 1000 UNITS CAPS, Take 1,000 unit marking on U-100 syringe by mouth daily  , Disp: , Rfl:   citalopram (CeleXA) 20 mg tablet, Take 20 mg by mouth daily , Disp: , Rfl:     JAKAFI 10 MG tablet, Take 1 tablet (10 mg total) by mouth daily, Disp: 30 tablet, Rfl: 5    levothyroxine 75 mcg tablet, Take 75 mcg by mouth daily, Disp: , Rfl: 3    metoprolol tartrate (LOPRESSOR) 25 mg tablet, Take 0 5 tablets by mouth 2 (two) times a day, Disp: , Rfl:     saccharomyces boulardii (FLORASTOR) 250 mg capsule, Take 1 capsule by mouth daily  , Disp: , Rfl:     sodium bicarbonate 650 mg tablet, TAKE 1 TABLET (650 MG TOTAL) BY MOUTH 2 (TWO) TIMES A DAY, Disp: , Rfl: 5    WELCHOL 625 MG tablet, TAKE 1 TABLET AT BEDTIME AT 8PM, Disp: , Rfl: 2    calcitriol (ROCALTROL) 0 25 mcg capsule, Take 1 capsule (0 25 mcg total) by mouth every other day for 30 days Take 1 tablet 3 times a week (Monday, Wednesday, Friday), Disp: 15 capsule, Rfl: 5    potassium chloride (K-DUR,KLOR-CON) 20 mEq tablet, Take 2 tablets (40 mEq total) by mouth daily (Patient not taking: Reported on 9/4/2019), Disp: , Rfl: 0    Past Medical History:   Diagnosis Date    Anxiety     Chronic kidney disease (CKD), stage IV (severe) (HCC)     stage IV    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    Compression fracture of cervical spine (HCC)     Hydronephrosis     Hypertension     Hypothyroid     Incontinence     Lung mass     Improving on PET/CT 1/2016    Polycythemia vera ([facility] [address] )     Pulmonary embolism ([facility] [address] ) 2014    Urinary tract infection      Past Surgical History:   Procedure Laterality Date    BLADDER SUSPENSION      BOTOX INJECTION N/A 7/27/2016    Procedure: BOTOX INJECTION ;  Surgeon: Rad Aquino MD;  [facility];  Service:     CHOLECYSTECTOMY N/A     COLONOSCOPY      CYSTECTOMY, RADICAL WITH ILEOCONDUIT N/A 10/4/2016    Procedure: SUPRATRIGONAL CYSTECTOMY WITH ILEAL CONDUIT ;  Surgeon: Rad Aquino MD;  [facility];  Service:     CYSTOSCOPY W/ RETROGRADES Bilateral 7/27/2016    Procedure: CYSTOSCOPY; RETROGRADE PYELOGRAM ;  Surgeon: Pamela Davenport MD;  [facility];  Service:     VT COLONOSCOPY FLX DX W/COLLJ SPEC WHEN PFRMD N/A 8/31/2016    Procedure: COLONOSCOPY;  Surgeon: Ofe Willis MD;  [facility]: [facility]; Service: Gastroenterology    VT CYSTOSCOPY,INSERT URETERAL STENT Bilateral 7/27/2016    Procedure: STENT INSERTION; EXCISION OF MESH ;  Surgeon: Pamela Davenport MD;  [facility];  Service: Urology    TONSILLECTOMY      TUBAL LIGATION      WISDOM TOOTH EXTRACTION       Family History   Problem Relation Age of Onset    Cancer Mother         small cell cancer     Heart disease Father     COPD Father     Heart disease Brother     Nephrolithiasis Brother       reports that she has never smoked  She has never used smokeless tobacco  She reports that she drank alcohol  She reports that she has current or past drug history  Physical Exam:   Vitals:    09/13/19 0903   BP: 148/85   BP Location: Left arm   Patient Position: Sitting   Pulse: 82   Weight: 91 1 kg (200 lb 12 8 oz)   Height: 5' (1 524 m)     Body mass index is 39 22 kg/m²      General: cooperative, in not acute distress  Eyes: conjunctivae pale, anicteric sclerae  ENT: lips and mucous membranes moist  Neck: supple, no JVD  Chest: clear breath sounds bilateral, no crackles, ronchus or wheezings  CVS: distinct S1 & S2, normal rate, regular rhythm  Abdomen: obese, soft, non-tender, non-distended, normoactive bowel sounds, ileo conduit over Right lower quadrant  Extremities: 1-2+  edema of both legs  Skin: no rash  Neuro: awake, alert, oriented      Lab Results:   Results from last 7 days   Lab Units 09/10/19  0652   WBC Thousand/uL 4 10*   HEMOGLOBIN g/dL 12 1   HEMATOCRIT % 38 8   PLATELETS Thousands/uL 310   POTASSIUM mmol/L 4 4   CHLORIDE mmol/L 110*   CO2 mmol/L 22   BUN mg/dL 46*   CREATININE mg/dL 3 37*   CALCIUM mg/dL 8 6   PHOSPHORUS mg/dL 4 2*           Portions of the record may have been created with voice recognition software  Occasional wrong word or "sound a like" substitutions may have occurred due to the inherent limitations of voice recognition software  Read the chart carefully and recognize, using context, where substitutions have occurred  If you have any questions, please contact the dictating provider

## 2019-09-13 NOTE — LETTER
September 13, 2019     Erin He MD  [address] [place], [place] [id number]    Patient: Les Franks   YOB: 1945   Date of Visit: 9/13/2019       Dear Dr Bebe Colindres: Thank you for referring Laura Akbar to me for evaluation  Below are my notes for this consultation  If you have questions, please do not hesitate to call me  I look forward to following your patient along with you  Sincerely,        Augustine Chin MD        CC: No Recipients  Augustine Chin MD  9/13/2019  9:28 AM  Sign at close encounter  OFFICE FOLLOW UP - Nephrology   Les Franks 69 y o  female MRN: [id number]       ASSESSMENT and PLAN:  Madison Verduzco was seen today for follow-up and chronic kidney disease  Diagnoses and all orders for this visit:    CKD (chronic kidney disease), stage IV ([facility])  -     metoprolol tartrate (LOPRESSOR) 25 mg tablet; Take 1 tablet (25 mg total) by mouth 2 (two) times a day  -     Renal function panel; Future  -     PTH, intact; Future  -     Basic metabolic panel; Future    Edema, unspecified type    Secondary hyperparathyroidism of renal origin ([facility] [address] )    CKD (chronic kidney disease) stage 4, GFR 15-29 ml/min ([facility])    Obstructive uropathy    Essential hypertension        This is a 60-year-old lady with known history of chronic kidney disease stage 4 with previous creatinine in the high 2, history of urinary incontinence, bilateral hydronephrosis secondary to obstructive uropathy and 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 in October 2016 who returns to the office for follow-up       1  Worsening chronic kidney disease stage 4  Currently patient is asymptomatic, no uremic symptoms  Most recent creatinine 3 37 with an estimated GFR of 13  She have worsening lower extremity edema    Given that her most recent serum bicarbonate is normal I will stop sodium bicarbonate at this moment as well as the amlodipine, will increase dose of metoprolol  I would like to repeat a BMP in 1 month, if renal function does not improve will refer her to vascular surgeon for AV fistula placed  I would like to see her back in the office in 3 months with repeat labs  Keep saving nondominant arm (left arm) in anticipation of dialysis access in the future, no intravenously lines or blood draws over the left arm  Avoid NSAIDs  Follow low-salt diet  I have discussed with patient about her worsening kidney function and the possibility that we may need to proceed with dialysis access placement soon, she agrees and understands the plan  She went to [employer] classes in the past       2  Hypertension, blood pressure elevated, given worsening lower extremity edema will stop amlodipine and increase dose of metoprolol  Given his stable serum bicarbonate will stop sodium bicarbonate dose  3  History of nonfunctional bladder causing bilateral hydronephrosis status post supra trigonal cystectomy and ileal conduit 10/2016, continue follow-up with urologist Dr Fahad Mendoza  4  Polycythemia area, previous history of PE, on Eliquis, continue follow-up with Hematology follow-up, Dr Jackie Lim  Most recent hemoglobin 12 1, will follow CBC  5  Mineral bone disease, PTH elevated but acceptable, continue with Calcitrol 1 tablet 3 times a week (Monday, Wednesday, Friday) follow labs in 3 months  6  Metabolic acidosis in the setting of advanced chronic kidney disease, most recent serum bicarbonate 22, given worsening lower extremity edema will stop sodium bicarbonate tablets for now and follow labs in 1 month  Patient Instructions   Stop taking sodium bicarbonate for now  Stop taking amlodipine  Increase dose of metoprolol to 1 tablet twice a day, (previously you will taking half a tablet twice a day)  I want you to have a repeat blood test (BMP) in 4 weeks, will contact you with the results    If your kidney function does not improve will send you to vascular surgeon evaluation for dialysis access placement  Follow low-salt diet  Okay to take Tylenol as needed for pain  I would like to see you back in the office in 2-3 months with another blood test   If you developed any worsening symptoms as increased lower extremity edema, decreased urine output, nausea, vomiting, increased shortness of breath, or any acute changes, please go to the emergency department for urgent evaluation  Keep saving nondominant arm (left arm) in anticipation of dialysis access placement in the near future, no intravenously lines or blood draws over the left arm as much as possible  HPI: Darnell Hare is a 69 y o  female who is here for Follow-up and Chronic Kidney Disease    Hospitalized in October 2018 due to a small bowel obstruction that resolved spontaneously  Hospitalized early January 2019 status post fall, found to have a left-sided subdural hematoma, creatinine on admission 3 0 that decreased to 2 5 after intravenously fluids  Hospitalized May 2019 after status post fall complicated with T-spine compression fracture, found to have sepsis secondary to UTI, also developed acute kidney injury on top of CKD, creatinine on admission was 3 68, renal function improved and creatinine went down to 2 59 on discharge day  Last time seen in our office in June 21, 2019  Today returns to the office for 3 month follow-up    In general doing okay  Patient currently has no complaints at this time other than lower back pain with certain position  Patient denies any chest pain, shortness of breath, noticed worsening leg swelling  Appetite is stable, no nausea or vomiting, energy level unchanged  No changes on the amount of urine output through ileal conduit, no gross hematuria  No recent worsening diarrhea  Denies any NSAID use  ROS:   All the systems were reviewed and were negative except as documented on the HPI      Allergies: Chlorhexidine    Medications:   Current Outpatient Medications:     acetaminophen (TYLENOL) 325 mg tablet, 650 mg every 6 hours as needed for mild pain or headache (Patient taking differently: as needed 650 mg every 6 hours as needed for mild pain or headache), Disp: 30 tablet, Rfl: 0    amLODIPine (NORVASC) 10 mg tablet, Take 10 mg by mouth daily, Disp: , Rfl:     apixaban (ELIQUIS) 2 5 mg, Take 1 tablet (2 5 mg total) by mouth 2 (two) times a day, Disp: 60 tablet, Rfl: 5    Cholecalciferol (VITAMIN D3) 1000 UNITS CAPS, Take 1,000 unit marking on U-100 syringe by mouth daily  , Disp: , Rfl:     citalopram (CeleXA) 20 mg tablet, Take 20 mg by mouth daily , Disp: , Rfl:     JAKAFI 10 MG tablet, Take 1 tablet (10 mg total) by mouth daily, Disp: 30 tablet, Rfl: 5    levothyroxine 75 mcg tablet, Take 75 mcg by mouth daily, Disp: , Rfl: 3    metoprolol tartrate (LOPRESSOR) 25 mg tablet, Take 0 5 tablets by mouth 2 (two) times a day, Disp: , Rfl:     saccharomyces boulardii (FLORASTOR) 250 mg capsule, Take 1 capsule by mouth daily  , Disp: , Rfl:     sodium bicarbonate 650 mg tablet, TAKE 1 TABLET (650 MG TOTAL) BY MOUTH 2 (TWO) TIMES A DAY, Disp: , Rfl: 5    WELCHOL 625 MG tablet, TAKE 1 TABLET AT BEDTIME AT 8PM, Disp: , Rfl: 2    calcitriol (ROCALTROL) 0 25 mcg capsule, Take 1 capsule (0 25 mcg total) by mouth every other day for 30 days Take 1 tablet 3 times a week (Monday, Wednesday, Friday), Disp: 15 capsule, Rfl: 5    potassium chloride (K-DUR,KLOR-CON) 20 mEq tablet, Take 2 tablets (40 mEq total) by mouth daily (Patient not taking: Reported on 9/4/2019), Disp: , Rfl: 0    Past Medical History:   Diagnosis Date    Anxiety     Chronic kidney disease (CKD), stage IV (severe) (HCC)     stage IV    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    Compression fracture of cervical spine (HCC)     Hydronephrosis     Hypertension     Hypothyroid     Incontinence     Lung mass     Improving on PET/CT 1/2016    Polycythemia vera ([address] )  Pulmonary embolism ([facility] [address] ) 2014    Urinary tract infection      Past Surgical History:   Procedure Laterality Date    BLADDER SUSPENSION      BOTOX INJECTION N/A 7/27/2016    Procedure: BOTOX INJECTION ;  Surgeon: Leroy Live MD;  [facility];  Service:    [address], RADICAL WITH ILEOCONDUIT N/A 10/4/2016    Procedure: [place] Clas WITH ILEAL CONDUIT ;  Surgeon: Leroy Live MD;  [facility];  Service:    Baltazar Wilder W/ RETROGRADES Bilateral 7/27/2016    Procedure: Tammyen Opoka; RETROGRADE PYELOGRAM ;  Surgeon: Leroy Live MD;  [facility];  Service:     AK COLONOSCOPY FLX DX W/COLLJ [address] PFRMD N/A 8/31/2016    Procedure: COLONOSCOPY;  Surgeon: Erick Gandhi MD;  [facility]; Service: Gastroenterology    AK CYSTOSCOPY,INSERT URETERAL STENT Bilateral 7/27/2016    Procedure: STENT INSERTION; EXCISION OF MESH ;  Surgeon: Leroy Live MD;  [facility];  Service: Urology    TONSILLECTOMY      TUBAL LIGATION      WISDOM TOOTH EXTRACTION       Family History   Problem Relation Age of Onset    Cancer Mother         small cell cancer     Heart disease Father     COPD Father     Heart disease Brother     Nephrolithiasis Brother       reports that she has never smoked  She has never used smokeless tobacco  She reports that she drank alcohol  She reports that she has current or past drug history  Physical Exam:   Vitals:    09/13/19 0903   BP: 148/85   BP Location: Left arm   Patient Position: Sitting   Pulse: 82   Weight: 91 1 kg (200 lb 12 8 oz)   Height: 5' (1 524 m)     Body mass index is 39 22 kg/m²      General: cooperative, in not acute distress  Eyes: conjunctivae pale, anicteric sclerae  ENT: lips and mucous membranes moist  Neck: supple, no JVD  Chest: clear breath sounds bilateral, no crackles, ronchus or wheezings  CVS: distinct S1 & S2, normal rate, regular rhythm  Abdomen: obese, soft, non-tender, non-distended, normoactive bowel sounds, ileo conduit over Right lower quadrant  Extremities: 1-2+  edema of both legs  Skin: no rash  Neuro: awake, alert, oriented      Lab Results:   Results from last 7 days   Lab Units 09/10/19  0652   WBC Thousand/uL 4 10*   HEMOGLOBIN g/dL 12 1   HEMATOCRIT % 38 8   PLATELETS Thousands/uL 310   POTASSIUM mmol/L 4 4   CHLORIDE mmol/L 110*   CO2 mmol/L 22   BUN mg/dL 46*   CREATININE mg/dL 3 37*   CALCIUM mg/dL 8 6   PHOSPHORUS mg/dL 4 2*           Portions of the record may have been created with voice recognition software  Occasional wrong word or "sound a like" substitutions may have occurred due to the inherent limitations of voice recognition software  Read the chart carefully and recognize, using context, where substitutions have occurred  If you have any questions, please contact the dictating provider

## 2019-09-18 ENCOUNTER — TELEPHONE (OUTPATIENT)
Dept: UROLOGY | Facility: MEDICAL CENTER | Age: 74
End: 2019-09-18

## 2019-09-18 NOTE — TELEPHONE ENCOUNTER
Called Elliott Watt back and she asked if I would call Radha Odonnell @ [phone] her ID # [id number] she wants to change wafer [facility] to [facility] - She was recommended from wound care as it should help her leaking better  I called [employer] and they said that it was just a reminder  Called Elliott Watt back and she just has to give item number of new item

## 2019-09-18 NOTE — TELEPHONE ENCOUNTER
Patient of Dr Lan Aiken seen in [place] office  Patient received a call from [phone] her she had to call office to request order for supplies  Patient states this is her first time getting a call like this and does not understand the process  Patient wanted to check with office prior to calling [facility]  Please advise

## 2019-10-02 ENCOUNTER — APPOINTMENT (OUTPATIENT)
Dept: LAB | Facility: HOSPITAL | Age: 74
End: 2019-10-02
Attending: INTERNAL MEDICINE
Payer: COMMERCIAL

## 2019-10-02 DIAGNOSIS — D45 POLYCYTHEMIA VERA (HCC): ICD-10-CM

## 2019-10-02 DIAGNOSIS — N18.4 CKD (CHRONIC KIDNEY DISEASE), STAGE IV (HCC): ICD-10-CM

## 2019-10-02 DIAGNOSIS — I27.82 CHRONIC SADDLE PULMONARY EMBOLISM WITHOUT ACUTE COR PULMONALE (HCC): Chronic | ICD-10-CM

## 2019-10-02 DIAGNOSIS — I26.92 CHRONIC SADDLE PULMONARY EMBOLISM WITHOUT ACUTE COR PULMONALE (HCC): Chronic | ICD-10-CM

## 2019-10-02 LAB
ALBUMIN SERPL BCP-MCNC: 3.3 G/DL (ref 3.5–5)
ALP SERPL-CCNC: 73 U/L (ref 46–116)
ALT SERPL W P-5'-P-CCNC: 9 U/L (ref 12–78)
ANION GAP SERPL CALCULATED.3IONS-SCNC: 6 MMOL/L (ref 4–13)
AST SERPL W P-5'-P-CCNC: 12 U/L (ref 5–45)
BASOPHILS # BLD AUTO: 0.05 THOUSANDS/ΜL (ref 0–0.1)
BASOPHILS NFR BLD AUTO: 1 % (ref 0–1)
BILIRUB SERPL-MCNC: 0.38 MG/DL (ref 0.2–1)
BUN SERPL-MCNC: 45 MG/DL (ref 5–25)
CALCIUM SERPL-MCNC: 8.5 MG/DL (ref 8.3–10.1)
CHLORIDE SERPL-SCNC: 113 MMOL/L (ref 100–108)
CO2 SERPL-SCNC: 20 MMOL/L (ref 21–32)
CREAT SERPL-MCNC: 3.14 MG/DL (ref 0.6–1.3)
EOSINOPHIL # BLD AUTO: 0.07 THOUSAND/ΜL (ref 0–0.61)
EOSINOPHIL NFR BLD AUTO: 2 % (ref 0–6)
ERYTHROCYTE [DISTWIDTH] IN BLOOD BY AUTOMATED COUNT: 14.6 % (ref 11.6–15.1)
GFR SERPL CREATININE-BSD FRML MDRD: 14 ML/MIN/1.73SQ M
GLUCOSE P FAST SERPL-MCNC: 84 MG/DL (ref 65–99)
HCT VFR BLD AUTO: 34.1 % (ref 34.8–46.1)
HGB BLD-MCNC: 10.9 G/DL (ref 11.5–15.4)
IMM GRANULOCYTES # BLD AUTO: 0.03 THOUSAND/UL (ref 0–0.2)
IMM GRANULOCYTES NFR BLD AUTO: 1 % (ref 0–2)
LYMPHOCYTES # BLD AUTO: 0.93 THOUSANDS/ΜL (ref 0.6–4.47)
LYMPHOCYTES NFR BLD AUTO: 20 % (ref 14–44)
MCH RBC QN AUTO: 29.6 PG (ref 26.8–34.3)
MCHC RBC AUTO-ENTMCNC: 32 G/DL (ref 31.4–37.4)
MCV RBC AUTO: 93 FL (ref 82–98)
MONOCYTES # BLD AUTO: 0.28 THOUSAND/ΜL (ref 0.17–1.22)
MONOCYTES NFR BLD AUTO: 6 % (ref 4–12)
NEUTROPHILS # BLD AUTO: 3.2 THOUSANDS/ΜL (ref 1.85–7.62)
NEUTS SEG NFR BLD AUTO: 70 % (ref 43–75)
NRBC BLD AUTO-RTO: 0 /100 WBCS
PLATELET # BLD AUTO: 315 THOUSANDS/UL (ref 149–390)
PMV BLD AUTO: 10.4 FL (ref 8.9–12.7)
POTASSIUM SERPL-SCNC: 4.2 MMOL/L (ref 3.5–5.3)
PROT SERPL-MCNC: 6.3 G/DL (ref 6.4–8.2)
RBC # BLD AUTO: 3.68 MILLION/UL (ref 3.81–5.12)
SODIUM SERPL-SCNC: 139 MMOL/L (ref 136–145)
WBC # BLD AUTO: 4.56 THOUSAND/UL (ref 4.31–10.16)

## 2019-10-02 PROCEDURE — 36415 COLL VENOUS BLD VENIPUNCTURE: CPT

## 2019-10-02 PROCEDURE — 85025 COMPLETE CBC W/AUTO DIFF WBC: CPT

## 2019-10-02 PROCEDURE — 80053 COMPREHEN METABOLIC PANEL: CPT

## 2019-10-10 DIAGNOSIS — N25.81 SECONDARY HYPERPARATHYROIDISM OF RENAL ORIGIN (HCC): ICD-10-CM

## 2019-10-10 RX ORDER — CALCITRIOL 0.25 UG/1
CAPSULE, LIQUID FILLED ORAL
Qty: 15 CAPSULE | Refills: 5 | Status: SHIPPED | OUTPATIENT
Start: 2019-10-10 | End: 2020-05-11 | Stop reason: SDUPTHER

## 2019-10-22 ENCOUNTER — OFFICE VISIT (OUTPATIENT)
Dept: HEMATOLOGY ONCOLOGY | Facility: CLINIC | Age: 74
End: 2019-10-22
Payer: COMMERCIAL

## 2019-10-22 VITALS
OXYGEN SATURATION: 97 % | SYSTOLIC BLOOD PRESSURE: 132 MMHG | TEMPERATURE: 97.8 F | DIASTOLIC BLOOD PRESSURE: 88 MMHG | RESPIRATION RATE: 16 BRPM | HEART RATE: 57 BPM | WEIGHT: 202 LBS | BODY MASS INDEX: 39.66 KG/M2 | HEIGHT: 60 IN

## 2019-10-22 DIAGNOSIS — I26.92 CHRONIC SADDLE PULMONARY EMBOLISM WITHOUT ACUTE COR PULMONALE (HCC): Chronic | ICD-10-CM

## 2019-10-22 DIAGNOSIS — D45 POLYCYTHEMIA VERA (HCC): Primary | ICD-10-CM

## 2019-10-22 DIAGNOSIS — I27.82 CHRONIC SADDLE PULMONARY EMBOLISM WITHOUT ACUTE COR PULMONALE (HCC): Chronic | ICD-10-CM

## 2019-10-22 PROCEDURE — 99214 OFFICE O/P EST MOD 30 MIN: CPT | Performed by: INTERNAL MEDICINE

## 2019-10-22 NOTE — PROGRESS NOTES
Hematology Outpatient Follow - Up Note  Kelsi Herrmann 69 y o  female MRN: @ Encounter: [id number]        Date:  10/22/2019        Assessment/ Plan:    1  Polycythemia vera positive for JAK2 mutation, she is on ruxolitinib 10 mg p  O  Daily since 2016 with significant improvement in splenomegaly, constitutional symptoms, continue treatment    2  Multiple DVT/PE, continue apixaban 2 5 mg p o  B i d  Indefinitely    3  Chronic kidney disease grade 4 follow-up with nephrology    4  Follow-up in 6 months with CBC, CMP         HPI: She is 62-year-old  female with history of polycythemia vera diagnosed in 2006 with thrombocytosis, erythrocytosis with hemoglobin of 20 6, hematocrit 54, positive for JAK2 mutation diagnosed at [facility] had been on hydroxyurea also phlebotomy     She had chronic lower extremity deep venous thrombosis diagnosed in 2007 treated with Coumadin and later on she had large saddle pulmonary embolus in the right upper lobe branches as well in 07/2017 had been on apixaban 2 5 mg p o  B i d  Status post cystectomy with ileal conduit secondary to chronic hydronephrosis status post removal of IVC filter in 12/2016     Because of constitutional symptoms, fatigue, pruritus, the patient initiated on ruxolitinib 10 mg p o  B i d  In June 2016 and underwent bladder resection with ileostomy and admitted in February 2018 with small-bowel obstruction and then another admission in October 2018 with small-bowel obstruction secondary to scar formation     We reduced ruxolitinib because of pancytopenia to 10 mg p o  Daily she had significant clinical improvement no evidence of splenomegaly anymore      Interval History:  Status post fall with admission to the hospital, mild compression fracture, received physical therapy with improvement clinically       Previous Treatment:         Test Results:    Imaging: No results found      Labs:   Lab Results   Component Value Date    WBC 4 56 10/02/2019    HGB 10 9 (L) 10/02/2019    HCT 34 1 (L) 10/02/2019    MCV 93 10/02/2019     10/02/2019     Lab Results   Component Value Date     12/17/2015    K 4 2 10/02/2019     (H) 10/02/2019    CO2 20 (L) 10/02/2019    ANIONGAP 9 12/17/2015    BUN 45 (H) 10/02/2019    CREATININE 3 14 (H) 10/02/2019    GLUCOSE 138 10/04/2016    GLUF 84 10/02/2019    CALCIUM 8 5 10/02/2019    AST 12 10/02/2019    ALT 9 (L) 10/02/2019    ALKPHOS 73 10/02/2019    PROT 6 2 (L) 11/06/2015    BILITOT 0 49 11/06/2015    EGFR 14 10/02/2019       Lab Results   Component Value Date    IRON 42 (L) 05/29/2019    TIBC 316 05/29/2019    FERRITIN 147 05/29/2019       No results found for: [id number]      ROS:   Review of Systems   Constitutional: Positive for fatigue  Negative for activity change, appetite change, diaphoresis, fever and unexpected weight change  HENT: Negative for facial swelling, hearing loss, rhinorrhea, sinus pressure, sinus pain, sneezing, sore throat and tinnitus  Eyes: Negative for photophobia, pain, discharge, redness, itching and visual disturbance  Respiratory: Negative for apnea and chest tightness  Cardiovascular: Negative for chest pain, palpitations and leg swelling  Gastrointestinal: Negative for abdominal distention, abdominal pain, blood in stool, constipation, diarrhea, nausea, rectal pain and vomiting  Endocrine: Negative for cold intolerance, heat intolerance, polydipsia and polyphagia  Genitourinary: Negative for difficulty urinating, dyspareunia, frequency, hematuria, pelvic pain and urgency  Musculoskeletal: Negative for arthralgias, back pain, gait problem, joint swelling and myalgias  Skin: Negative for color change, pallor and rash  Allergic/Immunologic: Negative for environmental allergies and food allergies  Neurological: Negative for dizziness, tremors, seizures, syncope, speech difficulty, numbness and headaches  Hematological: Negative for adenopathy  Does not bruise/bleed easily  Psychiatric/Behavioral: Positive for decreased concentration  Negative for agitation, confusion, dysphoric mood, hallucinations and suicidal ideas  The patient is nervous/anxious  Current Medications: Reviewed  Allergies: Reviewed  PMH/FH/SH:  Reviewed      Physical Exam:    Body surface area is 1 87 meters squared  Wt Readings from Last 3 Encounters:   10/22/19 91 6 kg (202 lb)   19 91 1 kg (200 lb 12 8 oz)   19 91 2 kg (201 lb)        Temp Readings from Last 3 Encounters:   10/22/19 97 8 °F (36 6 °C) (Tympanic)   19 98 °F (36 7 °C) (Tympanic)   19 98 5 °F (36 9 °C) (Tympanic)        BP Readings from Last 3 Encounters:   10/22/19 132/88   19 148/85   19 138/78         Pulse Readings from Last 3 Encounters:   10/22/19 57   19 82   19 70        Physical Exam   Constitutional: She is oriented to person, place, and time  She appears well-developed and well-nourished  No distress  HENT:   Head: Normocephalic and atraumatic  Eyes: Conjunctivae are normal    Neck: Normal range of motion  Neck supple  No tracheal deviation present  Cardiovascular: Normal rate and regular rhythm  Exam reveals no gallop and no friction rub  No murmur heard  Pulmonary/Chest: Effort normal and breath sounds normal  No respiratory distress  She has no wheezes  She has no rales  She exhibits no tenderness  Abdominal: Soft  She exhibits no distension  There is no tenderness  Musculoskeletal: She exhibits no edema  Lymphadenopathy:     She has no cervical adenopathy  Neurological: She is alert and oriented to person, place, and time  Skin: Skin is warm and dry  She is not diaphoretic  No erythema  No pallor  Psychiatric: She has a normal mood and affect  Her behavior is normal  Judgment and thought content normal    Vitals reviewed  ECO    Goals and Barriers:  Current Goal: Minimize effects of disease  Barriers: None  Patient's Capacity to Self Care:  Patient is able to self care      Code Status: [unfilled]

## 2019-12-06 ENCOUNTER — APPOINTMENT (OUTPATIENT)
Dept: LAB | Facility: CLINIC | Age: 74
End: 2019-12-06
Payer: COMMERCIAL

## 2019-12-06 DIAGNOSIS — N18.4 CKD (CHRONIC KIDNEY DISEASE), STAGE IV (HCC): ICD-10-CM

## 2019-12-06 LAB
ALBUMIN SERPL BCP-MCNC: 4 G/DL (ref 3.5–5)
ANION GAP SERPL CALCULATED.3IONS-SCNC: 6 MMOL/L (ref 4–13)
BUN SERPL-MCNC: 41 MG/DL (ref 5–25)
CALCIUM SERPL-MCNC: 9.3 MG/DL (ref 8.3–10.1)
CHLORIDE SERPL-SCNC: 114 MMOL/L (ref 100–108)
CO2 SERPL-SCNC: 21 MMOL/L (ref 21–32)
CREAT SERPL-MCNC: 2.97 MG/DL (ref 0.6–1.3)
GFR SERPL CREATININE-BSD FRML MDRD: 15 ML/MIN/1.73SQ M
GLUCOSE P FAST SERPL-MCNC: 91 MG/DL (ref 65–99)
PHOSPHATE SERPL-MCNC: 3.5 MG/DL (ref 2.3–4.1)
POTASSIUM SERPL-SCNC: 4.9 MMOL/L (ref 3.5–5.3)
PTH-INTACT SERPL-MCNC: 334.9 PG/ML (ref 18.4–80.1)
SODIUM SERPL-SCNC: 141 MMOL/L (ref 136–145)

## 2019-12-06 PROCEDURE — 80069 RENAL FUNCTION PANEL: CPT

## 2019-12-06 PROCEDURE — 36415 COLL VENOUS BLD VENIPUNCTURE: CPT

## 2019-12-06 PROCEDURE — 83970 ASSAY OF PARATHORMONE: CPT

## 2020-01-13 ENCOUNTER — OFFICE VISIT (OUTPATIENT)
Dept: NEPHROLOGY | Facility: CLINIC | Age: 75
End: 2020-01-13
Payer: COMMERCIAL

## 2020-01-13 VITALS — BODY MASS INDEX: 38.87 KG/M2 | WEIGHT: 198 LBS | HEIGHT: 60 IN

## 2020-01-13 DIAGNOSIS — N18.4 CKD (CHRONIC KIDNEY DISEASE) STAGE 4, GFR 15-29 ML/MIN (HCC): Primary | ICD-10-CM

## 2020-01-13 DIAGNOSIS — I10 ESSENTIAL HYPERTENSION: ICD-10-CM

## 2020-01-13 DIAGNOSIS — N18.4 ANEMIA IN STAGE 4 CHRONIC KIDNEY DISEASE (HCC): ICD-10-CM

## 2020-01-13 DIAGNOSIS — N13.9 OBSTRUCTIVE UROPATHY: ICD-10-CM

## 2020-01-13 DIAGNOSIS — D63.1 ANEMIA IN STAGE 4 CHRONIC KIDNEY DISEASE (HCC): ICD-10-CM

## 2020-01-13 DIAGNOSIS — D45 POLYCYTHEMIA VERA (HCC): ICD-10-CM

## 2020-01-13 PROCEDURE — 99214 OFFICE O/P EST MOD 30 MIN: CPT | Performed by: INTERNAL MEDICINE

## 2020-01-13 NOTE — PROGRESS NOTES
OFFICE FOLLOW UP - Nephrology   Ismael Castaneda 69 y o  female MRN: [id number]       ASSESSMENT and PLAN:  Patel Patel was seen today for follow-up and chronic kidney disease  Diagnoses and all orders for this visit:    CKD (chronic kidney disease) stage 4, GFR 15-29 ml/min ([facility])  -     CBC; Future  -     PTH, intact; Future  -     Renal function panel; Future    Obstructive uropathy    Essential hypertension    Anemia in stage 4 chronic kidney disease ([facility])    Polycythemia vera ([facility] [address] )        This is a 69-year-old lady with known history of chronic kidney disease stage 4/5 with previous creatinine in the high 2 the low 3s, history of urinary incontinence, bilateral hydronephrosis secondary to obstructive uropathy and 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 in October 2016 who returns to the office for follow-up  1  Chronic kidney disease stage 4/5  In the setting of obstructive uropathy, functional solitary right kidney  Baseline creatinine in the high 2s to low 3s  Patient is currently asymptomatic without any uremic symptoms  Most recent blood test on 12/06 shows serum creatinine of 2 97 with an estimated GFR of 15  Her lower extremity edema significantly improved after stopping sodium bicarbonate tablets as well as amlodipine  Given that her kidney function is stable and she is asymptomatic I would like to keep following every 3 months with repeat labs  Once again discussed about keep saving nondominant arm (left arm) in anticipation of dialysis access in the future, no intravenously lines or blood draws over the left arm  She went to [facility] [employer] classes in the past     We discussed that once her kidney function worsen will refer her to vascular surgeon for fistula placement  2  Hypertension, blood pressure acceptable, follow low-salt diet, no changes in medication at this moment       3  History of nonfunctional bladder causing bilateral hydronephrosis status post supra trigonal cystectomy and ileal conduit 10/2016, continue follow-up with urologist Dr Fairy Bloch  4  Polycythemia area, previous history of PE, on Eliquis, continue follow-up with Hematology follow-up, Dr Carol Orozco  Most recent hemoglobin 10 9, will follow CBC  5  Mineral bone disease, PTH elevated but acceptable for his degree of kidney dysfunction, continue with Calcitrol 1 tablet 3 times a week (Monday, Wednesday, Friday) follow labs in 3 months  6  Metabolic acidosis in the setting of advanced chronic kidney disease, most recent serum bicarbonate 21 off sodium bicarbonate tablets, will follow for now  Patient Instructions   I would like to see you in 3 months with repeat labs  Please do not hesitate to contact us if you develop any worsening symptoms as decreased appetite, taste changes, increased fluid retention, nausea, vomiting, increased tiredness etc   Stay well-hydrated  Follow low-salt diet  No changes in your medications at this moment  Keep saving nondominant arm (left arm) no intravenously lines or blood draws over the left arm, in anticipation of dialysis access in the near future  HPI: Severiano Oconnor is a 69 y o  female who is here for Follow-up and Chronic Kidney Disease    Hospitalized in 10/2018 due to a small bowel obstruction that resolved spontaneously  Hospitalized early 01/2019 status post fall, found to have a left-sided subdural hematoma, creatinine on admission 3 0 that decreased to 2 5 after intravenously fluids  Hospitalized 05/2019 after status post fall complicated with T-spine compression fracture, found to have sepsis secondary to UTI, also developed acute kidney injury on top of CKD, creatinine on admission was 3 68, renal function improved and creatinine went down to 2 59 on discharge day  Last time seen in our office in 09/13/2019    Today returns to the office for 3 month follow-up    In general she is doing fine, feeling about the same   Patient currently has no complaints at this time other than some lose stools lately  Patient denies any chest pain, shortness of breath, no leg swelling  Appetite is stable, no nausea or vomiting, energy level unchanged  Chronic diarrhea on and off  No changes on the amount of urine output through ileal conduit, no gross hematuria  Denies any NSAID use  ROS: All the systems were reviewed and were negative except as documented on the H&P  Allergies: Chlorhexidine    Medications:   Current Outpatient Medications:     acetaminophen (TYLENOL) 325 mg tablet, 650 mg every 6 hours as needed for mild pain or headache (Patient taking differently: as needed 650 mg every 6 hours as needed for mild pain or headache), Disp: 30 tablet, Rfl: 0    apixaban (ELIQUIS) 2 5 mg, Take 1 tablet (2 5 mg total) by mouth 2 (two) times a day, Disp: 60 tablet, Rfl: 5    calcitriol (ROCALTROL) 0 25 mcg capsule, TAKE 1 CAPSULE BY MOUTH EVERY OTHER DAY , Disp: 15 capsule, Rfl: 5    Cholecalciferol (VITAMIN D3) 1000 UNITS CAPS, Take 1,000 unit marking on U-100 syringe by mouth daily  , Disp: , Rfl:     citalopram (CeleXA) 20 mg tablet, Take 20 mg by mouth daily , Disp: , Rfl:     JAKAFI 10 MG tablet, Take 1 tablet (10 mg total) by mouth daily, Disp: 30 tablet, Rfl: 5    levothyroxine 75 mcg tablet, Take 75 mcg by mouth daily, Disp: , Rfl: 3    metoprolol tartrate (LOPRESSOR) 25 mg tablet, Take 1 tablet (25 mg total) by mouth 2 (two) times a day, Disp: 60 tablet, Rfl: 5    saccharomyces boulardii (FLORASTOR) 250 mg capsule, Take 1 capsule by mouth daily  , Disp: , Rfl:     WELCHOL 625 MG tablet, TAKE 1 TABLET AT BEDTIME AT 8PM, Disp: , Rfl: 2    Past Medical History:   Diagnosis Date    Anxiety     Chronic kidney disease (CKD), stage IV (severe) (HCC)     stage IV    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    Compression fracture of cervical spine (HCC)     Hydronephrosis     Hypertension  Hypothyroid     Incontinence     Lung mass     Improving on PET/CT 1/2016    [facility]cy[facility]mia vera ([facility] [address] )     Pulmonary embolism ([facility] [address] ) 2014    Urinary tract infection      Past Surgical History:   Procedure Laterality Date    BLADDER SUSPENSION      BOTOX INJECTION N/A 7/27/2016    Procedure: BOTOX INJECTION ;  Surgeon: Jorje Jenkins MD;  [facility];  Service:     CHOLECYSTECTOMY N/A     COLONOSCOPY      CYSTECTOMY, RADICAL WITH ILEOCONDUIT N/A 10/4/2016    Procedure: Nahid Lawler WITH ILEAL CONDUIT ;  Surgeon: Jorje Jenkins MD;  [facility];  Service:    Jaswant Shouts W/ RETROGRADES Bilateral 7/27/2016    Procedure: Kong Rack ;  Surgeon: Jorje Jenkins MD;  [facility];  Service:     WV COLONOSCOPY FLX DX W/COLLJ SPEC WHEN PFRMD N/A 8/31/2016    Procedure: COLONOSCOPY;  Surgeon: Guillermo Howell MD;  Location: [facility]; Service: Gastroenterology    WV CYSTOSCOPY,INSERT URETERAL STENT Bilateral 7/27/2016    Procedure: STENT INSERTION; EXCISION OF MESH ;  Surgeon: Jorje Jenkins MD;  [facility];  Service: Urology    TONSILLECTOMY      TUBAL LIGATION      WISDOM TOOTH EXTRACTION       Family History   Problem Relation Age of Onset    Cancer Mother         small cell cancer     Heart disease Father     COPD Father     Heart disease Brother     Nephrolithiasis Brother       reports that she has never smoked  She has never used smokeless tobacco  She reports that she drank alcohol  She reports that she has current or past drug history  Physical Exam:   Vitals:    01/13/20 1027   Weight: 89 8 kg (198 lb)   Height: 5' (1 524 m)     Body mass index is 38 67 kg/m²      General: conscious, cooperative, in not acute distress  Eyes: conjunctivae pink, anicteric sclerae  ENT: lips and mucous membranes moist  Neck: supple, no JVD  Chest: clear breath sounds bilateral, no crackles, ronchus or wheezings  CVS: distinct S1 & S2, normal rate, regular rhythm  Abdomen: soft, non-tender, non-distended, normoactive bowel sounds  Back: no CVA tenderness  Extremities: no edema of both legs  Skin: no rash  Neuro: awake, alert, oriented          Laboratory Results:  Lab Results   Component Value Date    WBC 4 56 10/02/2019    HGB 10 9 (L) 10/02/2019    HCT 34 1 (L) 10/02/2019    MCV 93 10/02/2019     10/02/2019     Lab Results   Component Value Date    SODIUM 141 12/06/2019    K 4 9 12/06/2019     (H) 12/06/2019    CO2 21 12/06/2019    BUN 41 (H) 12/06/2019    CREATININE 2 97 (H) 12/06/2019    GLUC 94 06/26/2019    CALCIUM 9 3 12/06/2019       Lab Results   Component Value Date     9 (H) 12/06/2019    CALCIUM 9 3 12/06/2019    PHOS 3 5 12/06/2019             Portions of the record may have been created with voice recognition software  Occasional wrong word or "sound a like" substitutions may have occurred due to the inherent limitations of voice recognition software  Read the chart carefully and recognize, using context, where substitutions have occurred  If you have any questions, please contact the dictating provider

## 2020-01-13 NOTE — LETTER
January 13, 2020     Kumar Interiano MD  [address] [phone]    Patient: George Mast   YOB: 1945   Date of Visit: 1/13/2020       Dear Dr Jovanna Ferguson: Thank you for referring Ramu Walker to me for evaluation  Below are my notes for this consultation  If you have questions, please do not hesitate to call me  I look forward to following your patient along with you  Sincerely,        Gloria Lemons MD        CC: No Recipients  Gloria Lemons MD  1/13/2020 10:58 AM  Incomplete  OFFICE FOLLOW UP - Nephrology   George Mast 69 y o  female MRN: [id number]       ASSESSMENT and PLAN:  Yassine East was seen today for follow-up and chronic kidney disease  Diagnoses and all orders for this visit:    CKD (chronic kidney disease) stage 4, GFR 15-29 ml/min ([facility])  -     CBC; Future  -     PTH, intact; Future  -     Renal function panel; Future    Obstructive uropathy    Essential hypertension    Anemia in stage 4 chronic kidney disease ([facility])    Polycythemia vera ([facility] [address] )        This is a 67-year-old lady with known history of chronic kidney disease stage 4/5 with previous creatinine in the high 2 the low 3s, history of urinary incontinence, bilateral hydronephrosis secondary to obstructive uropathy and 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 in October 2016 who returns to the office for follow-up  1  Chronic kidney disease stage 4/5  In the setting of obstructive uropathy, functional solitary right kidney  Baseline creatinine in the high 2s to low 3s  Patient is currently asymptomatic without any uremic symptoms  Most recent blood test on 12/06 shows serum creatinine of 2 97 with an estimated GFR of 15  Her lower extremity edema significantly improved after stopping sodium bicarbonate tablets as well as amlodipine    Given that her kidney function is stable and she is asymptomatic I would like to keep following every 3 months with repeat labs  Once again discussed about keep saving nondominant arm (left arm) in anticipation of dialysis access in the future, no intravenously lines or blood draws over the left arm  She went to [facility] [employer] classes in the past     We discussed that once her kidney function worsen will refer her to vascular surgeon for fistula placement  2  Hypertension, blood pressure acceptable, follow low-salt diet, no changes in medication at this moment  3  History of nonfunctional bladder causing bilateral hydronephrosis status post supra trigonal cystectomy and ileal conduit 10/2016, continue follow-up with urologist Dr Maria Isabel Prater  4  Polycythemia area, previous history of PE, on Eliquis, continue follow-up with Hematology follow-up, Dr Dahlia Rosado  Most recent hemoglobin 10 9, will follow CBC  5  Mineral bone disease, PTH elevated but acceptable for his degree of kidney dysfunction, continue with Calcitrol 1 tablet 3 times a week (Monday, Wednesday, Friday) follow labs in 3 months  6  Metabolic acidosis in the setting of advanced chronic kidney disease, most recent serum bicarbonate 21 off sodium bicarbonate tablets, will follow for now  Patient Instructions   I would like to see you in 3 months with repeat labs  Please do not hesitate to contact us if you develop any worsening symptoms as decreased appetite, taste changes, increased fluid retention, nausea, vomiting, increased tiredness etc   Stay well-hydrated  Follow low-salt diet  No changes in your medications at this moment  Keep saving nondominant arm (left arm) no intravenously lines or blood draws over the left arm, in anticipation of dialysis access in the near future  HPI: Austin Hunter is a 69 y o  female who is here for Follow-up and Chronic Kidney Disease    Hospitalized in 10/2018 due to a small bowel obstruction that resolved spontaneously    Hospitalized early 01/2019 status post fall, found to have a left-sided subdural hematoma, creatinine on admission 3 0 that decreased to 2 5 after intravenously fluids  Hospitalized 05/2019 after status post fall complicated with T-spine compression fracture, found to have sepsis secondary to UTI, also developed acute kidney injury on top of CKD, creatinine on admission was 3 68, renal function improved and creatinine went down to 2 59 on discharge day  Last time seen in our office in 09/13/2019  Today returns to the office for 3 month follow-up    In general she is doing fine, feeling about the same  Patient currently has no complaints at this time other than some lose stools lately  Patient denies any chest pain, shortness of breath, no leg swelling  Appetite is stable, no nausea or vomiting, energy level unchanged  Chronic diarrhea on and off  No changes on the amount of urine output through ileal conduit, no gross hematuria  Denies any NSAID use  ROS: All the systems were reviewed and were negative except as documented on the H&P  Allergies: Chlorhexidine    Medications:   Current Outpatient Medications:     acetaminophen (TYLENOL) 325 mg tablet, 650 mg every 6 hours as needed for mild pain or headache (Patient taking differently: as needed 650 mg every 6 hours as needed for mild pain or headache), Disp: 30 tablet, Rfl: 0    apixaban (ELIQUIS) 2 5 mg, Take 1 tablet (2 5 mg total) by mouth 2 (two) times a day, Disp: 60 tablet, Rfl: 5    calcitriol (ROCALTROL) 0 25 mcg capsule, TAKE 1 CAPSULE BY MOUTH EVERY OTHER DAY , Disp: 15 capsule, Rfl: 5    Cholecalciferol (VITAMIN D3) 1000 UNITS CAPS, Take 1,000 unit marking on U-100 syringe by mouth daily  , Disp: , Rfl:     citalopram (CeleXA) 20 mg tablet, Take 20 mg by mouth daily , Disp: , Rfl:     JAKAFI 10 MG tablet, Take 1 tablet (10 mg total) by mouth daily, Disp: 30 tablet, Rfl: 5    levothyroxine 75 mcg tablet, Take 75 mcg by mouth daily, Disp: , Rfl: 3    metoprolol tartrate (LOPRESSOR) 25 mg tablet, Take 1 tablet (25 mg total) by mouth 2 (two) times a day, Disp: 60 tablet, Rfl: 5    saccharomyces boulardii (FLORASTOR) 250 mg capsule, Take 1 capsule by mouth daily  , Disp: , Rfl:     WELCHOL 625 MG tablet, TAKE 1 TABLET AT BEDTIME AT 8PM, Disp: , Rfl: 2    Past Medical History:   Diagnosis Date    Anxiety     Chronic kidney disease (CKD), stage IV (severe) (HCC)     stage IV    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    Compression fracture of cervical spine (HCC)     Hydronephrosis     Hypertension     Hypothyroid     Incontinence     Lung mass     Improving on PET/CT 1/2016    [facility]a [facility] [address] [facility]     Pulmonary embolism ([facility] ) 2014    Urinary tract infection      Past Surgical History:   Procedure Laterality Date    BLADDER SUSPENSION      BOTOX INJECTION N/A 7/27/2016    Procedure: BOTOX INJECTION ;  Surgeon: Mateusz Acosta MD;  [facility];  Service:     CHOLECYSTECTOMY N/A     COLONOSCOPY      CYSTECTOMY, RADICAL WITH ILEOCONDUIT N/A 10/4/2016    Procedure: SUPRATRIGONAL CYSTECTOMY WITH ILEAL CONDUIT ;  Surgeon: Mateusz Acosta MD;  [facility];  Service:    Jean Princess W/ RETROGRADES Bilateral 7/27/2016    Procedure: Bridger Oseguera; RETROGRADE PYELOGRAM ;  Surgeon: Mateusz Acosta MD;  [facility];  Service:     NH COLONOSCOPY FLX DX W/COLLJ SPEC WHEN PFRMD N/A 8/31/2016    Procedure: COLONOSCOPY;  Surgeon: Em Onofre MD;  Location: [facility];   Service: Gastroenterology    NH CYSTOSCOPY,INSERT URETERAL STENT Bilateral 7/27/2016    Procedure: STENT INSERTION; EXCISION OF MESH ;  Surgeon: Mateusz Acosta MD;  [facility];  Service: Urology    TONSILLECTOMY      TUBAL LIGATION      WISDOM TOOTH EXTRACTION       Family History   Problem Relation Age of Onset    Cancer Mother         small cell cancer     Heart disease Father     COPD Father     Heart disease Brother     Nephrolithiasis Brother       reports that she has never smoked  She has never used smokeless tobacco  She reports that she drank alcohol  She reports that she has current or past drug history  Physical Exam:   Vitals:    01/13/20 1027   Weight: 89 8 kg (198 lb)   Height: 5' (1 524 m)     Body mass index is 38 67 kg/m²  General: conscious, cooperative, in not acute distress  Eyes: conjunctivae pink, anicteric sclerae  ENT: lips and mucous membranes moist  Neck: supple, no JVD  Chest: clear breath sounds bilateral, no crackles, ronchus or wheezings  CVS: distinct S1 & S2, normal rate, regular rhythm  Abdomen: soft, non-tender, non-distended, normoactive bowel sounds  Back: no CVA tenderness  Extremities: no edema of both legs  Skin: no rash  Neuro: awake, alert, oriented          Laboratory Results:  Lab Results   Component Value Date    WBC 4 56 10/02/2019    HGB 10 9 (L) 10/02/2019    HCT 34 1 (L) 10/02/2019    MCV 93 10/02/2019     10/02/2019     Lab Results   Component Value Date    SODIUM 141 12/06/2019    K 4 9 12/06/2019     (H) 12/06/2019    CO2 21 12/06/2019    BUN 41 (H) 12/06/2019    CREATININE 2 97 (H) 12/06/2019    GLUC 94 06/26/2019    CALCIUM 9 3 12/06/2019       Lab Results   Component Value Date     9 (H) 12/06/2019    CALCIUM 9 3 12/06/2019    PHOS 3 5 12/06/2019             Portions of the record may have been created with voice recognition software  Occasional wrong word or "sound a like" substitutions may have occurred due to the inherent limitations of voice recognition software  Read the chart carefully and recognize, using context, where substitutions have occurred  If you have any questions, please contact the dictating provider

## 2020-01-13 NOTE — PATIENT INSTRUCTIONS
I would like to see you in 3 months with repeat labs  Please do not hesitate to contact us if you develop any worsening symptoms as decreased appetite, taste changes, increased fluid retention, nausea, vomiting, increased tiredness etc   Stay well-hydrated  Follow low-salt diet  No changes in your medications at this moment  Keep saving nondominant arm (left arm) no intravenously lines or blood draws over the left arm, in anticipation of dialysis access in the near future

## 2020-02-12 DIAGNOSIS — D45 POLYCYTHEMIA VERA (HCC): ICD-10-CM

## 2020-02-12 RX ORDER — RUXOLITINIB 10 MG/1
10 TABLET ORAL DAILY
Qty: 30 TABLET | Refills: 5 | Status: SHIPPED | OUTPATIENT
Start: 2020-02-12 | End: 2020-09-10

## 2020-03-30 ENCOUNTER — TELEPHONE (OUTPATIENT)
Dept: NEPHROLOGY | Facility: CLINIC | Age: 75
End: 2020-03-30

## 2020-04-01 ENCOUNTER — APPOINTMENT (OUTPATIENT)
Dept: LAB | Age: 75
End: 2020-04-01
Payer: COMMERCIAL

## 2020-04-01 DIAGNOSIS — I26.92 CHRONIC SADDLE PULMONARY EMBOLISM WITHOUT ACUTE COR PULMONALE (HCC): Chronic | ICD-10-CM

## 2020-04-01 DIAGNOSIS — D45 POLYCYTHEMIA VERA (HCC): ICD-10-CM

## 2020-04-01 DIAGNOSIS — I27.82 CHRONIC SADDLE PULMONARY EMBOLISM WITHOUT ACUTE COR PULMONALE (HCC): Chronic | ICD-10-CM

## 2020-04-01 DIAGNOSIS — N18.4 CKD (CHRONIC KIDNEY DISEASE) STAGE 4, GFR 15-29 ML/MIN (HCC): ICD-10-CM

## 2020-04-01 LAB
ALBUMIN SERPL BCP-MCNC: 3.3 G/DL (ref 3.5–5)
ALP SERPL-CCNC: 70 U/L (ref 46–116)
ALT SERPL W P-5'-P-CCNC: <6 U/L (ref 12–78)
ANION GAP SERPL CALCULATED.3IONS-SCNC: 6 MMOL/L (ref 4–13)
AST SERPL W P-5'-P-CCNC: 14 U/L (ref 5–45)
BASOPHILS # BLD AUTO: 0.06 THOUSANDS/ΜL (ref 0–0.1)
BASOPHILS NFR BLD AUTO: 1 % (ref 0–1)
BILIRUB SERPL-MCNC: 0.47 MG/DL (ref 0.2–1)
BUN SERPL-MCNC: 40 MG/DL (ref 5–25)
CALCIUM SERPL-MCNC: 8.5 MG/DL (ref 8.3–10.1)
CHLORIDE SERPL-SCNC: 111 MMOL/L (ref 100–108)
CO2 SERPL-SCNC: 21 MMOL/L (ref 21–32)
CREAT SERPL-MCNC: 2.91 MG/DL (ref 0.6–1.3)
EOSINOPHIL # BLD AUTO: 0.07 THOUSAND/ΜL (ref 0–0.61)
EOSINOPHIL NFR BLD AUTO: 2 % (ref 0–6)
ERYTHROCYTE [DISTWIDTH] IN BLOOD BY AUTOMATED COUNT: 14.2 % (ref 11.6–15.1)
GFR SERPL CREATININE-BSD FRML MDRD: 15 ML/MIN/1.73SQ M
GLUCOSE P FAST SERPL-MCNC: 93 MG/DL (ref 65–99)
HCT VFR BLD AUTO: 34.9 % (ref 34.8–46.1)
HGB BLD-MCNC: 11 G/DL (ref 11.5–15.4)
IMM GRANULOCYTES # BLD AUTO: 0.02 THOUSAND/UL (ref 0–0.2)
IMM GRANULOCYTES NFR BLD AUTO: 0 % (ref 0–2)
LYMPHOCYTES # BLD AUTO: 0.62 THOUSANDS/ΜL (ref 0.6–4.47)
LYMPHOCYTES NFR BLD AUTO: 14 % (ref 14–44)
MCH RBC QN AUTO: 29.8 PG (ref 26.8–34.3)
MCHC RBC AUTO-ENTMCNC: 31.5 G/DL (ref 31.4–37.4)
MCV RBC AUTO: 95 FL (ref 82–98)
MONOCYTES # BLD AUTO: 0.3 THOUSAND/ΜL (ref 0.17–1.22)
MONOCYTES NFR BLD AUTO: 7 % (ref 4–12)
NEUTROPHILS # BLD AUTO: 3.51 THOUSANDS/ΜL (ref 1.85–7.62)
NEUTS SEG NFR BLD AUTO: 76 % (ref 43–75)
NRBC BLD AUTO-RTO: 0 /100 WBCS
PHOSPHATE SERPL-MCNC: 3.7 MG/DL (ref 2.3–4.1)
PLATELET # BLD AUTO: 301 THOUSANDS/UL (ref 149–390)
PMV BLD AUTO: 10.4 FL (ref 8.9–12.7)
POTASSIUM SERPL-SCNC: 4.1 MMOL/L (ref 3.5–5.3)
PROT SERPL-MCNC: 6.6 G/DL (ref 6.4–8.2)
PTH-INTACT SERPL-MCNC: 228.1 PG/ML (ref 18.4–80.1)
RBC # BLD AUTO: 3.69 MILLION/UL (ref 3.81–5.12)
SODIUM SERPL-SCNC: 138 MMOL/L (ref 136–145)
WBC # BLD AUTO: 4.58 THOUSAND/UL (ref 4.31–10.16)

## 2020-04-01 PROCEDURE — 85025 COMPLETE CBC W/AUTO DIFF WBC: CPT

## 2020-04-01 PROCEDURE — 83970 ASSAY OF PARATHORMONE: CPT

## 2020-04-01 PROCEDURE — 84100 ASSAY OF PHOSPHORUS: CPT

## 2020-04-01 PROCEDURE — 80053 COMPREHEN METABOLIC PANEL: CPT

## 2020-04-01 PROCEDURE — 36415 COLL VENOUS BLD VENIPUNCTURE: CPT

## 2020-04-06 ENCOUNTER — TELEMEDICINE (OUTPATIENT)
Dept: NEPHROLOGY | Facility: CLINIC | Age: 75
End: 2020-04-06
Payer: COMMERCIAL

## 2020-04-06 DIAGNOSIS — I10 ESSENTIAL HYPERTENSION: ICD-10-CM

## 2020-04-06 DIAGNOSIS — D45 POLYCYTHEMIA VERA (HCC): ICD-10-CM

## 2020-04-06 DIAGNOSIS — N32.89 BLADDER MASS: ICD-10-CM

## 2020-04-06 DIAGNOSIS — N25.81 SECONDARY HYPERPARATHYROIDISM OF RENAL ORIGIN (HCC): ICD-10-CM

## 2020-04-06 DIAGNOSIS — N18.4 CKD (CHRONIC KIDNEY DISEASE) STAGE 4, GFR 15-29 ML/MIN (HCC): Primary | ICD-10-CM

## 2020-04-06 PROCEDURE — 99423 OL DIG E/M SVC 21+ MIN: CPT | Performed by: INTERNAL MEDICINE

## 2020-04-16 ENCOUNTER — TELEPHONE (OUTPATIENT)
Dept: HEMATOLOGY ONCOLOGY | Facility: CLINIC | Age: 75
End: 2020-04-16

## 2020-04-21 ENCOUNTER — TELEPHONE (OUTPATIENT)
Dept: HEMATOLOGY ONCOLOGY | Facility: CLINIC | Age: 75
End: 2020-04-21

## 2020-04-22 ENCOUNTER — TELEPHONE (OUTPATIENT)
Dept: HEMATOLOGY ONCOLOGY | Facility: CLINIC | Age: 75
End: 2020-04-22

## 2020-04-22 ENCOUNTER — TELEMEDICINE (OUTPATIENT)
Dept: HEMATOLOGY ONCOLOGY | Facility: CLINIC | Age: 75
End: 2020-04-22
Payer: COMMERCIAL

## 2020-04-22 DIAGNOSIS — D45 POLYCYTHEMIA VERA (HCC): Primary | ICD-10-CM

## 2020-04-22 PROCEDURE — G2012 BRIEF CHECK IN BY MD/QHP: HCPCS | Performed by: PHYSICIAN ASSISTANT

## 2020-05-10 DIAGNOSIS — N25.81 SECONDARY HYPERPARATHYROIDISM OF RENAL ORIGIN (HCC): ICD-10-CM

## 2020-05-11 RX ORDER — CALCITRIOL 0.25 UG/1
CAPSULE, LIQUID FILLED ORAL
Qty: 15 CAPSULE | Refills: 5 | Status: SHIPPED | OUTPATIENT
Start: 2020-05-11 | End: 2021-01-20

## 2020-05-29 DIAGNOSIS — N18.4 CKD (CHRONIC KIDNEY DISEASE), STAGE IV (HCC): ICD-10-CM

## 2020-06-16 ENCOUNTER — APPOINTMENT (OUTPATIENT)
Dept: LAB | Age: 75
End: 2020-06-16
Payer: COMMERCIAL

## 2020-06-16 DIAGNOSIS — N18.4 CKD (CHRONIC KIDNEY DISEASE) STAGE 4, GFR 15-29 ML/MIN (HCC): ICD-10-CM

## 2020-06-16 LAB
ALBUMIN SERPL BCP-MCNC: 3.3 G/DL (ref 3.5–5)
ANION GAP SERPL CALCULATED.3IONS-SCNC: 7 MMOL/L (ref 4–13)
BUN SERPL-MCNC: 47 MG/DL (ref 5–25)
CALCIUM SERPL-MCNC: 8.7 MG/DL (ref 8.3–10.1)
CHLORIDE SERPL-SCNC: 113 MMOL/L (ref 100–108)
CO2 SERPL-SCNC: 19 MMOL/L (ref 21–32)
CREAT SERPL-MCNC: 3.45 MG/DL (ref 0.6–1.3)
CREAT UR-MCNC: 108 MG/DL
GFR SERPL CREATININE-BSD FRML MDRD: 12 ML/MIN/1.73SQ M
GLUCOSE P FAST SERPL-MCNC: 87 MG/DL (ref 65–99)
PHOSPHATE SERPL-MCNC: 4 MG/DL (ref 2.3–4.1)
POTASSIUM SERPL-SCNC: 4.8 MMOL/L (ref 3.5–5.3)
PROT UR-MCNC: 178 MG/DL
PROT/CREAT UR: 1.65 MG/G{CREAT} (ref 0–0.1)
PTH-INTACT SERPL-MCNC: 275.8 PG/ML (ref 18.4–80.1)
SODIUM SERPL-SCNC: 139 MMOL/L (ref 136–145)

## 2020-06-16 PROCEDURE — 84156 ASSAY OF PROTEIN URINE: CPT

## 2020-06-16 PROCEDURE — 80069 RENAL FUNCTION PANEL: CPT

## 2020-06-16 PROCEDURE — 82570 ASSAY OF URINE CREATININE: CPT

## 2020-06-16 PROCEDURE — 36415 COLL VENOUS BLD VENIPUNCTURE: CPT

## 2020-06-16 PROCEDURE — 83970 ASSAY OF PARATHORMONE: CPT

## 2020-06-24 ENCOUNTER — OFFICE VISIT (OUTPATIENT)
Dept: NEPHROLOGY | Facility: CLINIC | Age: 75
End: 2020-06-24
Payer: COMMERCIAL

## 2020-06-24 VITALS
SYSTOLIC BLOOD PRESSURE: 126 MMHG | HEIGHT: 60 IN | WEIGHT: 201 LBS | BODY MASS INDEX: 39.46 KG/M2 | TEMPERATURE: 98.2 F | DIASTOLIC BLOOD PRESSURE: 82 MMHG | HEART RATE: 72 BPM

## 2020-06-24 DIAGNOSIS — R80.9 PROTEINURIA, UNSPECIFIED TYPE: ICD-10-CM

## 2020-06-24 DIAGNOSIS — N25.81 SECONDARY HYPERPARATHYROIDISM OF RENAL ORIGIN (HCC): ICD-10-CM

## 2020-06-24 DIAGNOSIS — D45 POLYCYTHEMIA VERA (HCC): ICD-10-CM

## 2020-06-24 DIAGNOSIS — E87.2 METABOLIC ACIDOSIS: ICD-10-CM

## 2020-06-24 DIAGNOSIS — N18.5 CKD (CHRONIC KIDNEY DISEASE), STAGE V (HCC): Primary | ICD-10-CM

## 2020-06-24 DIAGNOSIS — N13.9 OBSTRUCTIVE UROPATHY: ICD-10-CM

## 2020-06-24 PROCEDURE — 99214 OFFICE O/P EST MOD 30 MIN: CPT | Performed by: PHYSICIAN ASSISTANT

## 2020-07-14 ENCOUNTER — APPOINTMENT (EMERGENCY)
Dept: RADIOLOGY | Facility: HOSPITAL | Age: 75
DRG: 092 | End: 2020-07-14
Payer: COMMERCIAL

## 2020-07-14 ENCOUNTER — HOSPITAL ENCOUNTER (EMERGENCY)
Facility: HOSPITAL | Age: 75
Discharge: HOME | DRG: 092 | End: 2020-07-14
Attending: EMERGENCY MEDICINE
Payer: COMMERCIAL

## 2020-07-14 VITALS
SYSTOLIC BLOOD PRESSURE: 179 MMHG | DIASTOLIC BLOOD PRESSURE: 92 MMHG | TEMPERATURE: 97.7 F | OXYGEN SATURATION: 94 % | RESPIRATION RATE: 18 BRPM | HEART RATE: 58 BPM

## 2020-07-14 DIAGNOSIS — R19.7 DIARRHEA: Primary | ICD-10-CM

## 2020-07-14 DIAGNOSIS — B02.9 SHINGLES RASH: ICD-10-CM

## 2020-07-14 DIAGNOSIS — N39.0 UTI (URINARY TRACT INFECTION): ICD-10-CM

## 2020-07-14 LAB
ALBUMIN SERPL BCP-MCNC: 3.8 G/DL (ref 3.5–5)
ALP SERPL-CCNC: 72 U/L (ref 46–116)
ALT SERPL W P-5'-P-CCNC: 10 U/L (ref 12–78)
ANION GAP SERPL CALCULATED.3IONS-SCNC: 10 MMOL/L (ref 4–13)
ANION GAP SERPL CALCULATED.3IONS-SCNC: 7 MMOL/L (ref 4–13)
AST SERPL W P-5'-P-CCNC: 16 U/L (ref 5–45)
BACTERIA UR QL AUTO: ABNORMAL /HPF
BASE EX.OXY STD BLDV CALC-SCNC: 70.4 % (ref 60–80)
BASE EXCESS BLDV CALC-SCNC: -9.3 MMOL/L
BASOPHILS # BLD AUTO: 0.05 THOUSANDS/ΜL (ref 0–0.1)
BASOPHILS NFR BLD AUTO: 1 % (ref 0–1)
BILIRUB SERPL-MCNC: 0.67 MG/DL (ref 0.2–1)
BILIRUB UR QL STRIP: ABNORMAL
BUN SERPL-MCNC: 37 MG/DL (ref 5–25)
BUN SERPL-MCNC: 38 MG/DL (ref 5–25)
CALCIUM SERPL-MCNC: 8.5 MG/DL (ref 8.3–10.1)
CALCIUM SERPL-MCNC: 8.8 MG/DL (ref 8.3–10.1)
CHLORIDE SERPL-SCNC: 109 MMOL/L (ref 100–108)
CHLORIDE SERPL-SCNC: 113 MMOL/L (ref 100–108)
CLARITY UR: ABNORMAL
CO2 SERPL-SCNC: 16 MMOL/L (ref 21–32)
CO2 SERPL-SCNC: 17 MMOL/L (ref 21–32)
COLOR UR: ABNORMAL
COLOR, POC: NORMAL
CREAT SERPL-MCNC: 3.11 MG/DL (ref 0.6–1.3)
CREAT SERPL-MCNC: 3.45 MG/DL (ref 0.6–1.3)
EOSINOPHIL # BLD AUTO: 0.04 THOUSAND/ΜL (ref 0–0.61)
EOSINOPHIL NFR BLD AUTO: 1 % (ref 0–6)
ERYTHROCYTE [DISTWIDTH] IN BLOOD BY AUTOMATED COUNT: 14.6 % (ref 11.6–15.1)
GFR SERPL CREATININE-BSD FRML MDRD: 12 ML/MIN/1.73SQ M
GFR SERPL CREATININE-BSD FRML MDRD: 14 ML/MIN/1.73SQ M
GLUCOSE SERPL-MCNC: 120 MG/DL (ref 65–140)
GLUCOSE SERPL-MCNC: 98 MG/DL (ref 65–140)
GLUCOSE UR STRIP-MCNC: NEGATIVE MG/DL
HCO3 BLDV-SCNC: 16.2 MMOL/L (ref 24–30)
HCT VFR BLD AUTO: 36.6 % (ref 34.8–46.1)
HGB BLD-MCNC: 12.1 G/DL (ref 11.5–15.4)
HGB UR QL STRIP.AUTO: ABNORMAL
IMM GRANULOCYTES # BLD AUTO: 0.04 THOUSAND/UL (ref 0–0.2)
IMM GRANULOCYTES NFR BLD AUTO: 1 % (ref 0–2)
KETONES UR STRIP-MCNC: ABNORMAL MG/DL
LEUKOCYTE ESTERASE UR QL STRIP: ABNORMAL
LIPASE SERPL-CCNC: 289 U/L (ref 73–393)
LYMPHOCYTES # BLD AUTO: 0.66 THOUSANDS/ΜL (ref 0.6–4.47)
LYMPHOCYTES NFR BLD AUTO: 16 % (ref 14–44)
MCH RBC QN AUTO: 30.2 PG (ref 26.8–34.3)
MCHC RBC AUTO-ENTMCNC: 33.1 G/DL (ref 31.4–37.4)
MCV RBC AUTO: 91 FL (ref 82–98)
MONOCYTES # BLD AUTO: 0.47 THOUSAND/ΜL (ref 0.17–1.22)
MONOCYTES NFR BLD AUTO: 12 % (ref 4–12)
NEUTROPHILS # BLD AUTO: 2.81 THOUSANDS/ΜL (ref 1.85–7.62)
NEUTS SEG NFR BLD AUTO: 69 % (ref 43–75)
NITRITE UR QL STRIP: POSITIVE
NON-SQ EPI CELLS URNS QL MICRO: ABNORMAL /HPF
NRBC BLD AUTO-RTO: 0 /100 WBCS
O2 CT BLDV-SCNC: 10.8 ML/DL
PCO2 BLDV: 33.8 MM HG (ref 42–50)
PH BLDV: 7.3 [PH] (ref 7.3–7.4)
PH UR STRIP.AUTO: 6.5 [PH] (ref 4.5–8)
PLATELET # BLD AUTO: 249 THOUSANDS/UL (ref 149–390)
PMV BLD AUTO: 9.8 FL (ref 8.9–12.7)
PO2 BLDV: 37.2 MM HG (ref 35–45)
POTASSIUM SERPL-SCNC: 4 MMOL/L (ref 3.5–5.3)
POTASSIUM SERPL-SCNC: 4.3 MMOL/L (ref 3.5–5.3)
PROT SERPL-MCNC: 7.4 G/DL (ref 6.4–8.2)
PROT UR STRIP-MCNC: >=300 MG/DL
RBC # BLD AUTO: 4.01 MILLION/UL (ref 3.81–5.12)
RBC #/AREA URNS AUTO: ABNORMAL /HPF
SODIUM SERPL-SCNC: 135 MMOL/L (ref 136–145)
SODIUM SERPL-SCNC: 137 MMOL/L (ref 136–145)
SP GR UR STRIP.AUTO: 1.02 (ref 1–1.03)
UROBILINOGEN UR QL STRIP.AUTO: 0.2 E.U./DL
WBC # BLD AUTO: 4.07 THOUSAND/UL (ref 4.31–10.16)
WBC #/AREA URNS AUTO: ABNORMAL /HPF

## 2020-07-14 PROCEDURE — 80053 COMPREHEN METABOLIC PANEL: CPT | Performed by: EMERGENCY MEDICINE

## 2020-07-14 PROCEDURE — 80048 BASIC METABOLIC PNL TOTAL CA: CPT | Performed by: EMERGENCY MEDICINE

## 2020-07-14 PROCEDURE — 96361 HYDRATE IV INFUSION ADD-ON: CPT

## 2020-07-14 PROCEDURE — 96360 HYDRATION IV INFUSION INIT: CPT

## 2020-07-14 PROCEDURE — 87186 SC STD MICRODIL/AGAR DIL: CPT

## 2020-07-14 PROCEDURE — 87077 CULTURE AEROBIC IDENTIFY: CPT

## 2020-07-14 PROCEDURE — 99285 EMERGENCY DEPT VISIT HI MDM: CPT | Performed by: EMERGENCY MEDICINE

## 2020-07-14 PROCEDURE — 36415 COLL VENOUS BLD VENIPUNCTURE: CPT

## 2020-07-14 PROCEDURE — 83690 ASSAY OF LIPASE: CPT | Performed by: EMERGENCY MEDICINE

## 2020-07-14 PROCEDURE — 87086 URINE CULTURE/COLONY COUNT: CPT

## 2020-07-14 PROCEDURE — 81001 URINALYSIS AUTO W/SCOPE: CPT

## 2020-07-14 PROCEDURE — 74176 CT ABD & PELVIS W/O CONTRAST: CPT

## 2020-07-14 PROCEDURE — 85025 COMPLETE CBC W/AUTO DIFF WBC: CPT | Performed by: EMERGENCY MEDICINE

## 2020-07-14 PROCEDURE — 82805 BLOOD GASES W/O2 SATURATION: CPT | Performed by: EMERGENCY MEDICINE

## 2020-07-14 PROCEDURE — 99284 EMERGENCY DEPT VISIT MOD MDM: CPT

## 2020-07-14 RX ORDER — LOPERAMIDE HYDROCHLORIDE 2 MG/1
2 CAPSULE ORAL 4 TIMES DAILY PRN
Qty: 12 CAPSULE | Refills: 0 | Status: SHIPPED | OUTPATIENT
Start: 2020-07-14

## 2020-07-14 RX ORDER — VALACYCLOVIR HYDROCHLORIDE 1 G/1
1000 TABLET, FILM COATED ORAL ONCE
Status: COMPLETED | OUTPATIENT
Start: 2020-07-14 | End: 2020-07-14

## 2020-07-14 RX ORDER — SULFAMETHOXAZOLE AND TRIMETHOPRIM 800; 160 MG/1; MG/1
1 TABLET ORAL 2 TIMES DAILY
Qty: 10 TABLET | Refills: 0 | Status: SHIPPED | OUTPATIENT
Start: 2020-07-14 | End: 2020-07-19 | Stop reason: HOSPADM

## 2020-07-14 RX ORDER — VALACYCLOVIR HYDROCHLORIDE 1 G/1
1000 TABLET, FILM COATED ORAL 3 TIMES DAILY
Qty: 21 TABLET | Refills: 0 | Status: SHIPPED | OUTPATIENT
Start: 2020-07-14 | End: 2020-07-19 | Stop reason: HOSPADM

## 2020-07-14 RX ADMIN — VALACYCLOVIR HYDROCHLORIDE 1000 MG: 1 TABLET, FILM COATED ORAL at 12:31

## 2020-07-14 RX ADMIN — SODIUM CHLORIDE 1000 ML: 0.9 INJECTION, SOLUTION INTRAVENOUS at 11:33

## 2020-07-14 NOTE — ED NOTES
Medication requested from pharmacy at this time      Gabby Mishra RN  07/14/20 7894 Monitor electrolytes  Trend BUN/Cr  consider renal consult

## 2020-07-14 NOTE — ED ATTENDING ATTESTATION
7/14/2020  ISnehal MD, saw and evaluated the patient  I have discussed the patient with the resident/non-physician practitioner and agree with the resident's/non-physician practitioner's findings, Plan of Care, and MDM as documented in the resident's/non-physician practitioner's note, except where noted  All available labs and Radiology studies were reviewed  I was present for key portions of any procedure(s) performed by the resident/non-physician practitioner and I was immediately available to provide assistance  At this point I agree with the current assessment done in the Emergency Department  I have conducted an independent evaluation of this patient a history and physical is as follows:    61-year-old woman with history of ostomy presenting with diarrhea and rash  No particular abdominal pain  No fever, chills, other systemic symptoms  Awake alert no acute distress  Heart regular rate rhythm, no murmurs rubs or gallops  Lungs clear to auscultation bilaterally  Abdomen soft, nontender, nondistended  Ostomy site patent with no swelling, erythema, discharge  There is small amount of liquid stool in the ostomy bag  Patient has vesicular rash in dermatomal pattern to the right buttock wrapping around right hip which appears consistent with shingles  Labs and imaging checked  Patient did have bicarb of 16 pH of 7 3  After hydration bicarb improved to 17, I think acidosis likely secondary to the diarrhea and not severe at this point  Patient instructed to return if worsening symptoms and given follow-up instructions      ED Course         Critical Care Time  Procedures

## 2020-07-14 NOTE — ED PROVIDER NOTES
History  Chief Complaint   Patient presents with    Diarrhea     Pt reports diarrhea since saturday  Has ostomy bag and reports rash around it  Patient presents for evaluation of diarrhea and rash  Past medical history significant for CKD, prior PE, SBO, bowel resection with end ostomy in 2016  Patient states that since Saturday she has been having diarrhea output in her ostomy bag and that when she changes her ostomy bag she finds a small amount of blood  She denies any fever chills, nausea/vomiting, dysuria, hematuria  Patient does state that originally she had mild abdominal pain which then resolved, currently pain free  Denies any recent antibiotic use, travels, new food intake  Patient also complains of a rash that has developed over the past couple of days that she describes is being over her right buttocks  She states this rash is painful, no relieving or exacerbating factors  Prior to Admission Medications   Prescriptions Last Dose Informant Patient Reported? Taking? Cholecalciferol (VITAMIN D3) 1000 UNITS CAPS 2020 at Unknown time Self Yes Yes   Sig: Take 1,000 unit marking on U-100 syringe by mouth daily  JAKAFI 10 MG tablet 2020 at Unknown time  No Yes   Sig: Take 1 tablet (10 mg total) by mouth daily   WELCHOL 625 MG tablet Unknown at Unknown time Self Yes No   Sig: as needed    acetaminophen (TYLENOL) 325 mg tablet  Self No No   Si mg every 6 hours as needed for mild pain or headache   Patient taking differently: as needed 650 mg every 6 hours as needed for mild pain or headache   apixaban (ELIQUIS) 2 5 mg 2020 at Unknown time Self No Yes   Sig: Take 1 tablet (2 5 mg total) by mouth 2 (two) times a day   calcitriol (ROCALTROL) 0 25 mcg capsule 2020 at Unknown time  No Yes   Sig: TAKE 1 CAPSULE BY MOUTH EVERY OTHER DAY     citalopram (CeleXA) 20 mg tablet 2020 at Unknown time Self Yes Yes   Sig: Take 20 mg by mouth daily    levothyroxine 75 mcg tablet 7/14/2020 at Unknown time Self Yes Yes   Sig: Take 75 mcg by mouth daily   metoprolol tartrate (LOPRESSOR) 25 mg tablet 7/14/2020 at Unknown time  No Yes   Sig: TAKE 1 TABLET BY MOUTH TWICE A DAY   saccharomyces boulardii (FLORASTOR) 250 mg capsule 7/14/2020 at Unknown time Self Yes Yes   Sig: Take 1 capsule by mouth daily        Facility-Administered Medications: None       Past Medical History:   Diagnosis Date    Anxiety     Chronic kidney disease (CKD), stage IV (severe) (HCC)     stage IV    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    Compression fracture of cervical spine (HCC)     Hydronephrosis     Hypertension     Hypothyroid     Incontinence     Lung mass     Improving on PET/CT 1/2016    [facility]cythemia vera [facility]     Pulmonary embolism ([facility] [facility] ) 2014    Urinary tract infection        Past Surgical History:   Procedure Laterality Date    BLADDER SUSPENSION      BOTOX INJECTION N/A 7/27/2016    Procedure: BOTOX INJECTION ;  Surgeon: Macario Milligan MD;  [facility];  Service:     CHOLECYSTECTOMY N/A     COLONOSCOPY      CYSTECTOMY, RADICAL WITH ILEOCONDUIT N/A 10/4/2016    Procedure: Cristhian Moody ;  Surgeon: Macario Milligan MD;  [facility];  Service:    Jose A Chavez W/ RETROGRADES Bilateral 7/27/2016    Procedure: Leah Austin; RETROGRADE PYELOGRAM ;  Surgeon: Macario Milligan MD;  [facility];  Service:     MD COLONOSCOPY FLX DX W/COLLJ SPEC WHEN PFRMD N/A 8/31/2016    Procedure: COLONOSCOPY;  Surgeon: Jenniffer Velasquez MD;  Location: [facility];   Service: Gastroenterology    MD CYSTOSCOPY,INSERT URETERAL STENT Bilateral 7/27/2016    Procedure: STENT INSERTION; EXCISION OF MESH ;  Surgeon: Macario Milligan MD;  [facility];  Service: Urology    TONSILLECTOMY      TUBAL LIGATION      WISDOM TOOTH EXTRACTION         Family History   Problem Relation Age of Onset    Cancer Mother         small cell cancer     Heart disease Father     COPD Father     Heart disease Brother     Nephrolithiasis Brother      I have reviewed and agree with the history as documented  E-Cigarette/Vaping    E-Cigarette Use Never User      E-Cigarette/Vaping Substances    Nicotine No     THC No     CBD No     Flavoring No     Other No     Unknown No      Social History     Tobacco Use    Smoking status: Never Smoker    Smokeless tobacco: Never Used    Tobacco comment: n/a   Substance Use Topics    Alcohol use: Not Currently     Frequency: Never     Binge frequency: Never     Comment: n/s    Drug use: Not Currently     Comment: n/a        Review of Systems   Constitutional: Negative for chills, diaphoresis, fatigue and fever  Respiratory: Negative for cough and shortness of breath  Cardiovascular: Negative for chest pain and palpitations  Gastrointestinal: Positive for diarrhea  Negative for abdominal distention, abdominal pain, constipation, nausea and vomiting  Genitourinary: Negative for dysuria, frequency and hematuria  Musculoskeletal: Negative for arthralgias, myalgias and neck pain  Skin: Positive for rash  Neurological: Negative for dizziness, syncope, light-headedness and headaches  All other systems reviewed and are negative        Physical Exam  ED Triage Vitals   Temperature Pulse Respirations Blood Pressure SpO2   07/14/20 1125 07/14/20 1116 07/14/20 1116 07/14/20 1116 07/14/20 1116   97 7 °F (36 5 °C) 75 20 152/81 95 %      Temp Source Heart Rate Source Patient Position - Orthostatic VS BP Location FiO2 (%)   07/14/20 1125 07/14/20 1116 07/14/20 1116 07/14/20 1116 --   Oral Monitor Lying Left arm       Pain Score       07/14/20 1116       5             Orthostatic Vital Signs  Vitals:    07/14/20 1200 07/14/20 1230 07/14/20 1300 07/14/20 1400   BP: (!) 178/79 165/80 156/78 (!) 179/92   Pulse: 67 60 57 58   Patient Position - Orthostatic VS:           Physical Exam   Constitutional: She is oriented to person, place, and time  She appears well-nourished  No distress  HENT:   Head: Normocephalic and atraumatic  Right Ear: External ear normal    Left Ear: External ear normal    Mouth/Throat: Oropharynx is clear and moist    Eyes: Pupils are equal, round, and reactive to light  Conjunctivae and EOM are normal  Right eye exhibits no discharge  Left eye exhibits no discharge  No scleral icterus  Neck: Normal range of motion  Neck supple  No JVD present  Cardiovascular: Normal rate, regular rhythm, normal heart sounds and intact distal pulses  Exam reveals no gallop and no friction rub  No murmur heard  Pulmonary/Chest: Effort normal and breath sounds normal  No respiratory distress  She has no wheezes  She has no rales  Abdominal: Soft  Bowel sounds are normal  She exhibits no distension and no mass  There is no tenderness  There is no guarding  Abdomen is diffusely soft, nontender with palpation   Musculoskeletal: Normal range of motion  She exhibits no tenderness or deformity  Neurological: She is alert and oriented to person, place, and time  No cranial nerve deficit or sensory deficit  She exhibits normal muscle tone  Coordination normal    Skin: Skin is warm  Rash noted  Rash is papular and vesicular  She is not diaphoretic  Psychiatric: She has a normal mood and affect  Her behavior is normal  Judgment and thought content normal    Vitals reviewed        ED Medications  Medications   sodium chloride 0 9 % bolus 1,000 mL (0 mL Intravenous Stopped 7/14/20 1333)   valACYclovir (VALTREX) tablet 1,000 mg (1,000 mg Oral Given 7/14/20 1231)       Diagnostic Studies  Results Reviewed     Procedure Component Value Units Date/Time    Basic metabolic panel [[id number]]  (Abnormal) Collected:  07/14/20 1333    Lab Status:  Final result Specimen:  Blood from Arm, Right Updated:  07/14/20 1355     Sodium 137 mmol/L      Potassium 4 3 mmol/L      Chloride 113 mmol/L      CO2 17 mmol/L      ANION GAP 7 mmol/L      BUN 37 mg/dL      Creatinine 3 11 mg/dL      Glucose 98 mg/dL      Calcium 8 5 mg/dL      eGFR 14 ml/min/1 73sq m     Narrative:       [place] guidelines for Chronic Kidney Disease (CKD):     Stage 1 with normal or high GFR (GFR > 90 mL/min/1 73 square meters)    Stage 2 Mild CKD (GFR = 60-89 mL/min/1 73 square meters)    Stage 3A Moderate CKD (GFR = 45-59 mL/min/1 73 square meters)    Stage 3B Moderate CKD (GFR = 30-44 mL/min/1 73 square meters)    Stage 4 Severe CKD (GFR = 15-29 mL/min/1 73 square meters)    Stage 5 End Stage CKD (GFR <15 mL/min/1 73 square meters)  Note: GFR calculation is accurate only with a steady state creatinine    Blood gas, venous [[id number]]  (Abnormal) Collected:  07/14/20 1236    Lab Status:  Final result Specimen:  Blood from Arm, Right Updated:  07/14/20 1243     pH, [place] 7 298     pCO2, [place] 33 8 mm Hg      pO2, [place] 37 2 mm Hg      HCO3, [place] 16 2 mmol/L      Base Excess, [place] -9 3 mmol/L      O2 Content, [place] 10 8 ml/dL      O2 HGB, VENOUS 70 4 %     Urine Microscopic [[id number]]  (Abnormal) Collected:  07/14/20 1139    Lab Status:  Final result Specimen:  Urine, Other Updated:  07/14/20 1241     RBC, UA Innumerable /hpf      WBC, UA Innumerable /hpf      Epithelial Cells       Field obscured, unable to enumerate     /hpf     Bacteria, UA       Field obscured, unable to enumerate     /hpf    Urine culture [[id number]] Collected:  07/14/20 1139    Lab Status:   In process Specimen:  Urine, Other Updated:  07/14/20 1241    Comprehensive metabolic panel [[id number]]  (Abnormal) Collected:  07/14/20 1129    Lab Status:  Final result Specimen:  Blood from Arm, Right Updated:  07/14/20 1206     Sodium 135 mmol/L      Potassium 4 0 mmol/L      Chloride 109 mmol/L      CO2 16 mmol/L      ANION GAP 10 mmol/L      BUN 38 mg/dL      Creatinine 3 45 mg/dL      Glucose 120 mg/dL      Calcium 8 8 mg/dL      AST 16 U/L      ALT 10 U/L      Alkaline Phosphatase 72 U/L      Total Protein 7 4 g/dL      Albumin 3 8 g/dL      Total Bilirubin 0 67 mg/dL      eGFR 12 ml/min/1 73sq m     Narrative:       National Kidney Disease Foundation guidelines for Chronic Kidney Disease (CKD):     Stage 1 with normal or high GFR (GFR > 90 mL/min/1 73 square meters)    Stage 2 Mild CKD (GFR = 60-89 mL/min/1 73 square meters)    Stage 3A Moderate CKD (GFR = 45-59 mL/min/1 73 square meters)    Stage 3B Moderate CKD (GFR = 30-44 mL/min/1 73 square meters)    Stage 4 Severe CKD (GFR = 15-29 mL/min/1 73 square meters)    Stage 5 End Stage CKD (GFR <15 mL/min/1 73 square meters)  Note: GFR calculation is accurate only with a steady state creatinine    Lipase [[id number]]  (Normal) Collected:  07/14/20 1129    Lab Status:  Final result Specimen:  Blood from Arm, Right Updated:  07/14/20 1206     Lipase 289 u/L     CBC and differential [[id number]]  (Abnormal) Collected:  07/14/20 1129    Lab Status:  Final result Specimen:  Blood from Arm, Right Updated:  07/14/20 1141     WBC 4 07 Thousand/uL      RBC 4 01 Million/uL      Hemoglobin 12 1 g/dL      Hematocrit 36 6 %      MCV 91 fL      MCH 30 2 pg      MCHC 33 1 g/dL      RDW 14 6 %      MPV 9 8 fL      Platelets 975 Thousands/uL      nRBC 0 /100 WBCs      Neutrophils Relative 69 %      Immat GRANS % 1 %      Lymphocytes Relative 16 %      Monocytes Relative 12 %      Eosinophils Relative 1 %      Basophils Relative 1 %      Neutrophils Absolute 2 81 Thousands/µL      Immature Grans Absolute 0 04 Thousand/uL      Lymphocytes Absolute 0 66 Thousands/µL      Monocytes Absolute 0 47 Thousand/µL      Eosinophils Absolute 0 04 Thousand/µL      Basophils Absolute 0 05 Thousands/µL     POCT urinalysis dipstick [[id number]]  (Normal) Resulted:  07/14/20 1141    Lab Status:  Final result Specimen:  Urine Updated:  07/14/20 1141     Color, UA see results    Urine Macroscopic, POC [[id number]]  (Abnormal) Collected:  07/14/20 1139    Lab Status:  Final result Specimen: Urine Updated:  07/14/20 1139     Color, UA Red     Clarity, UA Turbid     pH, UA 6 5     Leukocytes, UA Large     Nitrite, UA Positive     Protein, UA >=300 mg/dl      Glucose, UA Negative mg/dl      Ketones, UA Trace mg/dl      Urobilinogen, UA 0 2 E U /dl      Bilirubin, UA Interference- unable to analyze     Blood, UA Large     Specific Stanton, UA 1 020    Narrative:       [facility] RESULT                 CT abdomen pelvis wo contrast   Final Result by Alejandra Rizvi MD (07/14 1300)   No acute infiltrates descending   There is no bowel obstruction   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      Again noted is a cystic mass in the right iliac fossa which may postoperative or may be of for adnexal region, stable, can be related with ultrasound   Right renal calculus   Atrophic left kidney          I personally discussed this study with [address] [place] [address] on 7/14/2020 at 12:57 PM                            Workstation performed: [id number]               Procedures  Procedures      ED Course               Identification of Seniors at Risk      Most Recent Value   (ISAR) Identification of Seniors at Risk   Before the illness or injury that brought you to the Emergency, did you need someone to help you on a regular basis? 0 Filed at: 07/14/2020 1114   In the last 24 hours, have you needed more help than usual?  0 Filed at: 07/14/2020 1114   Have you been hospitalized for one or more nights during the past 6 months? 0 Filed at: 07/14/2020 1114   In general, do you see well?  0 Filed at: 07/14/2020 1114   In general, do you have serious problems with your memory? 0 Filed at: 07/14/2020 1114   Do you take more than three different medications every day?   1 Filed at: 07/14/2020 1114   ISAR Score  1 Filed at: 07/14/2020 1114                                  MDM  Number of Diagnoses or Management Options  Diarrhea:   Shingles rash:   UTI (urinary tract infection):   Diagnosis management comments: CT of abdomen and pelvis without any acute pathology  Patient was found to have a decreased bicarb on BMP and was provided with a fluid, showing a mild improvement in her acidosis  Patient provided with entire viral is a for her shingles, Bactrim for a possible UTI, and loperamide for her diarrhea  Instructed follow-up with her primary care physician for further discussion  Return precautions discussed  Disposition  Final diagnoses:   Diarrhea   Shingles rash   UTI (urinary tract infection)     Time reflects when diagnosis was documented in both MDM as applicable and the Disposition within this note     Time User Action Codes Description Comment    7/14/2020  1:58 PM Prosper Yeager Add [R19 7] Diarrhea     7/14/2020  1:58 PM Marino Del Castillo Add [B02 9] Shingles rash     7/14/2020  1:58 PM Marino Del Castillo Add [N39 0] UTI (urinary tract infection)       ED Disposition     ED Disposition Condition Date/Time Comment    Discharge Stable Tue Jul 14, 2020  1:58 PM Domingo Guthrie discharge to home/self care              Follow-up Information     Follow up With Specialties Details Why Contact Info Additional Information    Ar Martinez MD Internal Medicine   [facility] [address] [address]  [address] [place] [address],[address] Emergency Department Emergency Medicine   [place] [id number] [phone]  ED, [address] [place] [address], [address]   [phone]          Discharge Medication List as of 7/14/2020  2:01 PM      START taking these medications    Details   loperamide (IMODIUM) 2 mg capsule Take 1 capsule (2 mg total) by mouth 4 (four) times a day as needed for diarrhea, Starting Tue 7/14/2020, Print      sulfamethoxazole-trimethoprim (BACTRIM DS) 800-160 mg per tablet Take 1 tablet by mouth 2 (two) times a day for 5 days smx-tmp DS (BACTRIM) 800-160 mg tabs (1tab q12 D10), Starting Tue 7/14/2020, Until Sun 7/19/2020, Print      valACYclovir (VALTREX) 1,000 mg tablet Take 1 tablet (1,000 mg total) by mouth 3 (three) times a day for 7 days, Starting Tue 7/14/2020, Until Tue 7/21/2020, Print         CONTINUE these medications which have NOT CHANGED    Details   acetaminophen (TYLENOL) 325 mg tablet 650 mg every 6 hours as needed for mild pain or headache, Normal      apixaban (ELIQUIS) 2 5 mg Take 1 tablet (2 5 mg total) by mouth 2 (two) times a day, Starting Tue 8/13/2019, Normal      calcitriol (ROCALTROL) 0 25 mcg capsule TAKE 1 CAPSULE BY MOUTH EVERY OTHER DAY , Normal      Cholecalciferol (VITAMIN D3) 1000 UNITS CAPS Take 1,000 unit marking on U-100 syringe by mouth daily  , Historical Med      citalopram (CeleXA) 20 mg tablet Take 20 mg by mouth daily , Starting Wed 5/30/2018, Historical Med      JAKAFI 10 MG tablet Take 1 tablet (10 mg total) by mouth daily, Starting Wed 2/12/2020, Normal      levothyroxine 75 mcg tablet Take 75 mcg by mouth daily, Starting Sun 2/11/2018, Historical Med      metoprolol tartrate (LOPRESSOR) 25 mg tablet TAKE 1 TABLET BY MOUTH TWICE A DAY, Normal      saccharomyces boulardii (FLORASTOR) 250 mg capsule Take 1 capsule by mouth daily  , Historical Med      WELCHOL 625 MG tablet as needed , Starting Thu 12/28/2017, Historical Med           No discharge procedures on file  PDMP Review     None           ED Provider  Attending physically available and evaluated Anaya Fowler I managed the patient along with the ED Attending      Electronically Signed by         Nancyann Goodpasture, MD  07/15/20 6336

## 2020-07-15 ENCOUNTER — HOSPITAL ENCOUNTER (INPATIENT)
Facility: HOSPITAL | Age: 75
LOS: 4 days | Discharge: HOME | DRG: 092 | End: 2020-07-19
Attending: EMERGENCY MEDICINE | Admitting: GENERAL PRACTICE
Payer: COMMERCIAL

## 2020-07-15 ENCOUNTER — APPOINTMENT (EMERGENCY)
Dept: RADIOLOGY | Facility: HOSPITAL | Age: 75
DRG: 092 | End: 2020-07-15
Payer: COMMERCIAL

## 2020-07-15 DIAGNOSIS — N18.4 STAGE 4 CHRONIC KIDNEY DISEASE (HCC): ICD-10-CM

## 2020-07-15 DIAGNOSIS — D32.9 MENINGIOMA (HCC): ICD-10-CM

## 2020-07-15 DIAGNOSIS — R31.9 URINARY TRACT INFECTION WITH HEMATURIA, SITE UNSPECIFIED: ICD-10-CM

## 2020-07-15 DIAGNOSIS — R42 DIZZINESS: Primary | ICD-10-CM

## 2020-07-15 DIAGNOSIS — I26.92 CHRONIC SADDLE PULMONARY EMBOLISM WITHOUT ACUTE COR PULMONALE (HCC): Chronic | ICD-10-CM

## 2020-07-15 DIAGNOSIS — N18.4 CKD (CHRONIC KIDNEY DISEASE), STAGE IV (HCC): ICD-10-CM

## 2020-07-15 DIAGNOSIS — E87.2 METABOLIC ACIDOSIS: ICD-10-CM

## 2020-07-15 DIAGNOSIS — R94.31 T WAVE INVERSION IN EKG: ICD-10-CM

## 2020-07-15 DIAGNOSIS — G92.8 TOXIC METABOLIC ENCEPHALOPATHY: ICD-10-CM

## 2020-07-15 DIAGNOSIS — I27.82 CHRONIC SADDLE PULMONARY EMBOLISM WITHOUT ACUTE COR PULMONALE (HCC): Chronic | ICD-10-CM

## 2020-07-15 DIAGNOSIS — B02.9 SHINGLES: ICD-10-CM
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
## 2020-07-15 DIAGNOSIS — R44.3 HALLUCINATIONS: ICD-10-CM

## 2020-07-15 DIAGNOSIS — I10 ESSENTIAL HYPERTENSION: ICD-10-CM

## 2020-07-15 DIAGNOSIS — N39.0 URINARY TRACT INFECTION WITH HEMATURIA, SITE UNSPECIFIED: ICD-10-CM

## 2020-07-15 DIAGNOSIS — Z86.19 HISTORY OF CLOSTRIDIOIDES DIFFICILE COLITIS: ICD-10-CM

## 2020-07-15 LAB
ANION GAP SERPL CALCULATED.3IONS-SCNC: 8 MMOL/L (ref 4–13)
ATRIAL RATE: 65 BPM
ATRIAL RATE: 67 BPM
BASOPHILS # BLD AUTO: 0.03 THOUSANDS/ΜL (ref 0–0.1)
BASOPHILS NFR BLD AUTO: 1 % (ref 0–1)
BUN SERPL-MCNC: 40 MG/DL (ref 5–25)
CALCIUM SERPL-MCNC: 8.3 MG/DL (ref 8.3–10.1)
CHLORIDE SERPL-SCNC: 113 MMOL/L (ref 100–108)
CHLORIDE UR-SCNC: 104 MMOL/L
CO2 SERPL-SCNC: 16 MMOL/L (ref 21–32)
CREAT SERPL-MCNC: 3.37 MG/DL (ref 0.6–1.3)
CREAT UR-MCNC: 53.2 MG/DL
EOSINOPHIL # BLD AUTO: 0.05 THOUSAND/ΜL (ref 0–0.61)
EOSINOPHIL NFR BLD AUTO: 1 % (ref 0–6)
ERYTHROCYTE [DISTWIDTH] IN BLOOD BY AUTOMATED COUNT: 14.5 % (ref 11.6–15.1)
GFR SERPL CREATININE-BSD FRML MDRD: 13 ML/MIN/1.73SQ M
GLUCOSE SERPL-MCNC: 101 MG/DL (ref 65–140)
HCT VFR BLD AUTO: 31.1 % (ref 34.8–46.1)
HGB BLD-MCNC: 10.5 G/DL (ref 11.5–15.4)
IMM GRANULOCYTES # BLD AUTO: 0.06 THOUSAND/UL (ref 0–0.2)
IMM GRANULOCYTES NFR BLD AUTO: 2 % (ref 0–2)
LYMPHOCYTES # BLD AUTO: 0.59 THOUSANDS/ΜL (ref 0.6–4.47)
LYMPHOCYTES NFR BLD AUTO: 15 % (ref 14–44)
MCH RBC QN AUTO: 30.8 PG (ref 26.8–34.3)
MCHC RBC AUTO-ENTMCNC: 33.8 G/DL (ref 31.4–37.4)
MCV RBC AUTO: 91 FL (ref 82–98)
MONOCYTES # BLD AUTO: 0.38 THOUSAND/ΜL (ref 0.17–1.22)
MONOCYTES NFR BLD AUTO: 10 % (ref 4–12)
NEUTROPHILS # BLD AUTO: 2.8 THOUSANDS/ΜL (ref 1.85–7.62)
NEUTS SEG NFR BLD AUTO: 71 % (ref 43–75)
NRBC BLD AUTO-RTO: 0 /100 WBCS
P AXIS: 40 DEGREES
P AXIS: 83 DEGREES
PLATELET # BLD AUTO: 205 THOUSANDS/UL (ref 149–390)
PMV BLD AUTO: 9.9 FL (ref 8.9–12.7)
POTASSIUM SERPL-SCNC: 4.3 MMOL/L (ref 3.5–5.3)
PR INTERVAL: 134 MS
PR INTERVAL: 140 MS
QRS AXIS: -9 DEGREES
QRS AXIS: 1 DEGREES
QRSD INTERVAL: 86 MS
QRSD INTERVAL: 88 MS
QT INTERVAL: 426 MS
QT INTERVAL: 448 MS
QTC INTERVAL: 443 MS
QTC INTERVAL: 473 MS
RBC # BLD AUTO: 3.41 MILLION/UL (ref 3.81–5.12)
SODIUM 24H UR-SCNC: 105 MOL/L
SODIUM SERPL-SCNC: 137 MMOL/L (ref 136–145)
T WAVE AXIS: 12 DEGREES
T WAVE AXIS: 13 DEGREES
TROPONIN I SERPL-MCNC: <0.02 NG/ML
TROPONIN I SERPL-MCNC: <0.02 NG/ML
VENTRICULAR RATE: 65 BPM
VENTRICULAR RATE: 67 BPM
WBC # BLD AUTO: 3.91 THOUSAND/UL (ref 4.31–10.16)

## 2020-07-15 PROCEDURE — 99285 EMERGENCY DEPT VISIT HI MDM: CPT | Performed by: EMERGENCY MEDICINE

## 2020-07-15 PROCEDURE — 93010 ELECTROCARDIOGRAM REPORT: CPT | Performed by: INTERNAL MEDICINE

## 2020-07-15 PROCEDURE — 80048 BASIC METABOLIC PNL TOTAL CA: CPT | Performed by: EMERGENCY MEDICINE

## 2020-07-15 PROCEDURE — 93005 ELECTROCARDIOGRAM TRACING: CPT

## 2020-07-15 PROCEDURE — 99285 EMERGENCY DEPT VISIT HI MDM: CPT

## 2020-07-15 PROCEDURE — 82436 ASSAY OF URINE CHLORIDE: CPT | Performed by: INTERNAL MEDICINE

## 2020-07-15 PROCEDURE — 84300 ASSAY OF URINE SODIUM: CPT | Performed by: INTERNAL MEDICINE

## 2020-07-15 PROCEDURE — 99223 1ST HOSP IP/OBS HIGH 75: CPT | Performed by: INTERNAL MEDICINE

## 2020-07-15 PROCEDURE — 84484 ASSAY OF TROPONIN QUANT: CPT | Performed by: EMERGENCY MEDICINE

## 2020-07-15 PROCEDURE — 96365 THER/PROPH/DIAG IV INF INIT: CPT

## 2020-07-15 PROCEDURE — 96375 TX/PRO/DX INJ NEW DRUG ADDON: CPT

## 2020-07-15 PROCEDURE — 36415 COLL VENOUS BLD VENIPUNCTURE: CPT | Performed by: EMERGENCY MEDICINE

## 2020-07-15 PROCEDURE — 82570 ASSAY OF URINE CREATININE: CPT | Performed by: INTERNAL MEDICINE

## 2020-07-15 PROCEDURE — 99222 1ST HOSP IP/OBS MODERATE 55: CPT | Performed by: GENERAL PRACTICE

## 2020-07-15 PROCEDURE — 70450 CT HEAD/BRAIN W/O DYE: CPT

## 2020-07-15 PROCEDURE — 85025 COMPLETE CBC W/AUTO DIFF WBC: CPT | Performed by: EMERGENCY MEDICINE

## 2020-07-15 RX ORDER — SODIUM CHLORIDE, SODIUM GLUCONATE, SODIUM ACETATE, POTASSIUM CHLORIDE, MAGNESIUM CHLORIDE, SODIUM PHOSPHATE, DIBASIC, AND POTASSIUM PHOSPHATE .53; .5; .37; .037; .03; .012; .00082 G/100ML; G/100ML; G/100ML; G/100ML; G/100ML; G/100ML; G/100ML
500 INJECTION, SOLUTION INTRAVENOUS ONCE
Status: COMPLETED | OUTPATIENT
Start: 2020-07-15 | End: 2020-07-15

## 2020-07-15 RX ORDER — SACCHAROMYCES BOULARDII 250 MG
250 CAPSULE ORAL DAILY
Status: DISCONTINUED | OUTPATIENT
Start: 2020-07-15 | End: 2020-07-19 | Stop reason: HOSPADM

## 2020-07-15 RX ORDER — SODIUM BICARBONATE 650 MG/1
650 TABLET ORAL 2 TIMES DAILY
COMMUNITY
End: 2020-07-19 | Stop reason: HOSPADM

## 2020-07-15 RX ORDER — LEVOTHYROXINE SODIUM 0.07 MG/1
75 TABLET ORAL
Status: DISCONTINUED | OUTPATIENT
Start: 2020-07-16 | End: 2020-07-19 | Stop reason: HOSPADM

## 2020-07-15 RX ORDER — AMLODIPINE BESYLATE 10 MG/1
10 TABLET ORAL DAILY
COMMUNITY
End: 2020-07-19 | Stop reason: HOSPADM

## 2020-07-15 RX ORDER — MELATONIN
1000 DAILY
Status: DISCONTINUED | OUTPATIENT
Start: 2020-07-15 | End: 2020-07-19 | Stop reason: HOSPADM

## 2020-07-15 RX ORDER — MECLIZINE HCL 12.5 MG/1
12.5 TABLET ORAL ONCE
Status: COMPLETED | OUTPATIENT
Start: 2020-07-15 | End: 2020-07-15

## 2020-07-15 RX ORDER — ACETAMINOPHEN 325 MG/1
650 TABLET ORAL EVERY 6 HOURS PRN
Status: DISCONTINUED | OUTPATIENT
Start: 2020-07-15 | End: 2020-07-19 | Stop reason: HOSPADM

## 2020-07-15 RX ORDER — CITALOPRAM 20 MG/1
20 TABLET ORAL DAILY
Status: DISCONTINUED | OUTPATIENT
Start: 2020-07-15 | End: 2020-07-19 | Stop reason: HOSPADM

## 2020-07-15 RX ORDER — LOPERAMIDE HYDROCHLORIDE 2 MG/1
2 CAPSULE ORAL 4 TIMES DAILY PRN
Status: DISCONTINUED | OUTPATIENT
Start: 2020-07-15 | End: 2020-07-19 | Stop reason: HOSPADM

## 2020-07-15 RX ORDER — DIAZEPAM 5 MG/ML
2.5 INJECTION, SOLUTION INTRAMUSCULAR; INTRAVENOUS ONCE
Status: COMPLETED | OUTPATIENT
Start: 2020-07-15 | End: 2020-07-15

## 2020-07-15 RX ORDER — CALCITRIOL 0.25 UG/1
0.25 CAPSULE, LIQUID FILLED ORAL EVERY OTHER DAY
Status: DISCONTINUED | OUTPATIENT
Start: 2020-07-16 | End: 2020-07-19 | Stop reason: HOSPADM

## 2020-07-15 RX ADMIN — METOPROLOL TARTRATE 25 MG: 25 TABLET, FILM COATED ORAL at 17:33

## 2020-07-15 RX ADMIN — Medication 250 MG: at 17:33

## 2020-07-15 RX ADMIN — Medication 125 MG: at 20:52

## 2020-07-15 RX ADMIN — MELATONIN 1000 UNITS: at 17:33

## 2020-07-15 RX ADMIN — SODIUM CHLORIDE, SODIUM GLUCONATE, SODIUM ACETATE, POTASSIUM CHLORIDE, MAGNESIUM CHLORIDE, SODIUM PHOSPHATE, DIBASIC, AND POTASSIUM PHOSPHATE 500 ML: .53; .5; .37; .037; .03; .012; .00082 INJECTION, SOLUTION INTRAVENOUS at 07:52

## 2020-07-15 RX ADMIN — DIAZEPAM 2.5 MG: 10 INJECTION, SOLUTION INTRAMUSCULAR; INTRAVENOUS at 07:52

## 2020-07-15 RX ADMIN — CEFTRIAXONE SODIUM 1000 MG: 10 INJECTION, POWDER, FOR SOLUTION INTRAVENOUS at 17:33

## 2020-07-15 RX ADMIN — CITALOPRAM HYDROBROMIDE 20 MG: 20 TABLET ORAL at 17:37

## 2020-07-15 RX ADMIN — SODIUM BICARBONATE 75 ML/HR: 84 INJECTION, SOLUTION INTRAVENOUS at 17:33

## 2020-07-15 RX ADMIN — MECLIZINE HYDROCHLORIDE 12.5 MG: 12.5 TABLET, FILM COATED ORAL at 09:05

## 2020-07-15 RX ADMIN — APIXABAN 2.5 MG: 2.5 TABLET, FILM COATED ORAL at 17:33

## 2020-07-15 NOTE — ASSESSMENT & PLAN NOTE
Estimated Creatinine Clearance: 14 8 mL/min (A) (by C-G formula based on SCr of 3 37 mg/dL (H))    Cr appears to be at baseline 3 4  Sees Dr David Strong  Will consult renal

## 2020-07-15 NOTE — ED NOTES
P7 charge RN states that she is in a meeting and cannot confirm or deny tele box is ready for patient      Yessy Bean RN  07/15/20 1985

## 2020-07-15 NOTE — ASSESSMENT & PLAN NOTE
Pt has ileal conduit  Stop Bactrim in setting of CKD5  Will put on Rocephin  Vanco prophylaxis  Cx > 100,000 GNR

## 2020-07-15 NOTE — H&P
H&P- Zoya Wong [birth year], 77 y o  female MRN: [id number]    Unit/Bed#: JANEE Encounter: [id number]    Primary Care Provider: Elif Garcia MD   Date and time admitted to hospital: 7/15/2020  6:41 AM        * Toxic metabolic encephalopathy  Assessment & Plan  Likely 2/2 Valtrex toxicity in setting of CKD5  Stop Valtrex  Monitor symptoms      Herpes zoster  Assessment & Plan  In anal region  Pt took Valtrex 1 Gm x 2 8 hours apart  In setting of hallucinations stop Valtrex  Pt asymptomatic from this at this time so hold off on antivirals      UTI (urinary tract infection)  Assessment & Plan  Pt has ileal conduit  Stop Bactrim in setting of CKD5  Will put on Rocephin  Vanco prophylaxis  Cx > 100,000 GNR    Stage 5 chronic kidney disease not on chronic dialysis ([facility] [address] )  Assessment & Plan  Estimated Creatinine Clearance: 14 8 mL/min (A) (by C-G formula based on SCr of 3 37 mg/dL (H))  Cr appears to be at baseline 3 4  Sees Dr Lawana Nyhan  Will consult renal    Acidosis, metabolic  Assessment & Plan  2/2 CKD  Does not tolerate Bicarb tabs  Renal consult    Diarrhea of presumed infectious origin  Assessment & Plan  POA  Check C diff  Start Vanco prophylaxis in pt w/ hx C diff    Chronic saddle pulmonary embolism (HCC)  Assessment & Plan  C/w Eliquis low dose (in setting of CKD5)    VTE Prophylaxis: Apixaban (Eliquis)  / sequential compression device   Code Status: Full  POLST: POLST form is not discussed and not completed at this time  Discussion with family: no    Anticipated Length of Stay:  Patient will be admitted on an Inpatient basis with an anticipated length of stay of  At least 2 midnights  Justification for Hospital Stay: need to monitor metabolic acidosis and Valtrex toxicity    Total Time for Visit, including Counseling / Coordination of Care: 45 minutes  Greater than 50% of this total time spent on direct patient counseling and coordination of care      Chief Complaint:   Hallucinations and not feeling well    History of Present Illness:    Andrea Kong is a 77 y o  female w/ CKD4-5 and Hx C diff who presented to ER yesterday w/ dairrhea  Also noted rash on right buttocks at that time  Dx w/ shingles and UTI  D/c on Immodium, Bactrim, and Valtrex  Pt took 2 dose of Valtrex and c/o hallucinations and dizziness  N ofevers or issues w/ urination or n/v   Diarrhea improved w/ immodium  No CP or SOB  Review of Systems:    Review of Systems   Constitutional: Positive for appetite change and fatigue  HENT: Negative  Eyes: Negative  Respiratory: Negative  Cardiovascular: Negative  Gastrointestinal: Positive for diarrhea  Endocrine: Negative  Genitourinary: Negative  Musculoskeletal: Negative  Skin: Negative  Allergic/Immunologic: Negative  Neurological: Positive for dizziness  Hematological: Negative  Psychiatric/Behavioral: Negative          Past Medical and Surgical History:     Past Medical History:   Diagnosis Date    Anxiety     Chronic kidney disease (CKD), stage IV (severe) (HCC)     stage IV    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    Compression fracture of cervical spine (HCC)     Hydronephrosis     Hypertension     Hypothyroid     Incontinence     Lung mass     Improving on PET/CT 1/2016    Polycythemia vera ([facility] [address] )     Pulmonary embolism ([facility] [address] ) 2014    Urinary tract infection        Past Surgical History:   Procedure Laterality Date    BLADDER SUSPENSION      BOTOX INJECTION N/A 7/27/2016    Procedure: BOTOX INJECTION ;  Surgeon: Alvaro Gonzalez MD;  [facility];  Service:     CHOLECYSTECTOMY N/A     COLONOSCOPY      CYSTECTOMY, RADICAL WITH ILEOCONDUIT N/A 10/4/2016    Procedure: SUPRATRIGONAL CYSTECTOMY WITH ILEAL CONDUIT ;  Surgeon: Alvaro Gonzalez MD;  [facility];  Service:    Mazin Stacks W/ RETROGRADES Bilateral 7/27/2016    Procedure: Hussein Nassar ;  Surgeon: Alvaro Gonzalez MD; [facility];  Service:     [place] COLONOSCOPY FLX DX W/COLLJ SPEC WHEN PFRMD N/A 8/31/2016    Procedure: COLONOSCOPY;  Surgeon: Jess Barrera MD;  Location: [facility]; Service: Gastroenterology    [place] CYSTOSCOPY,INSERT URETERAL STENT Bilateral 7/27/2016    Procedure: STENT INSERTION; EXCISION OF MESH ;  Surgeon: Timoteo Franco MD;  [facility];  Service: Urology    TONSILLECTOMY      TUBAL LIGATION      WISDOM TOOTH EXTRACTION         Meds/Allergies:    Prior to Admission medications    Medication Sig Start Date End Date Taking? Authorizing Provider   acetaminophen (TYLENOL) 325 mg tablet 650 mg every 6 hours as needed for mild pain or headache  Patient taking differently: as needed 650 mg every 6 hours as needed for mild pain or headache 1/14/19  Yes Abram Clemens PA-C   apixaban (ELIQUIS) 2 5 mg Take 1 tablet (2 5 mg total) by mouth 2 (two) times a day 8/13/19  Yes Felicita Moe MD   calcitriol (ROCALTROL) 0 25 mcg capsule TAKE 1 CAPSULE BY MOUTH EVERY OTHER DAY  5/11/20  Yes Lawyer Alexsandra MD   Cholecalciferol (VITAMIN D3) 1000 UNITS CAPS Take 1,000 unit marking on U-100 syringe by mouth daily     Yes Historical Provider, MD   citalopram (CeleXA) 20 mg tablet Take 20 mg by mouth daily  5/30/18  Yes Historical Provider, MD   JAKAFI 10 MG tablet Take 1 tablet (10 mg total) by mouth daily 2/12/20  Yes Felicita Moe MD   levothyroxine 75 mcg tablet Take 75 mcg by mouth daily 2/11/18  Yes Historical Provider, MD   loperamide (IMODIUM) 2 mg capsule Take 1 capsule (2 mg total) by mouth 4 (four) times a day as needed for diarrhea 7/14/20  Yes Angelica Felipe MD   metoprolol tartrate (LOPRESSOR) 25 mg tablet TAKE 1 TABLET BY MOUTH TWICE A DAY 5/29/20  Yes Rey Hinson PA-C   saccharomyces boulardii (FLORASTOR) 250 mg capsule Take 1 capsule by mouth daily     Yes Historical Provider, MD   sulfamethoxazole-trimethoprim (BACTRIM DS) 800-160 mg per tablet Take 1 tablet by mouth 2 (two) times a day for 5 days smx-tmp DS (BACTRIM) 800-160 mg tabs (1tab q12 D10) 7/14/20 7/19/20 Yes Berta Waite MD   valACYclovir (VALTREX) 1,000 mg tablet Take 1 tablet (1,000 mg total) by mouth 3 (three) times a day for 7 days 7/14/20 7/21/20 Yes Berta Waite MD   [facility] 625 MG tablet as needed  12/28/17  Yes Historical Provider, MD     I have reviewed home medications using allscripts  Allergies: Allergies   Allergen Reactions    Chlorhexidine Rash     petichi like rash when using chlorhexidine swabs prior to IV  Social History:     Marital Status:      Substance Use History:   Social History     Substance and Sexual Activity   Alcohol Use Not Currently    Frequency: Never    Binge frequency: Never    Comment: n/s     Social History     Tobacco Use   Smoking Status Never Smoker   Smokeless Tobacco Never Used   Tobacco Comment    n/a     Social History     Substance and Sexual Activity   Drug Use Not Currently    Comment: n/a       Family History:    Family History   Problem Relation Age of Onset    Cancer Mother         small cell cancer     Heart disease Father     COPD Father     Heart disease Brother     Nephrolithiasis Brother        Physical Exam:     Vitals:   Blood Pressure: (!) 185/89 (07/15/20 1215)  Pulse: 70 (07/15/20 1215)  Temperature: 98 5 °F (36 9 °C) (07/15/20 0714)  Temp Source: Oral (07/15/20 0714)  Respirations: 16 (07/15/20 1215)  Height: 5' (152 4 cm) (07/15/20 0915)  Weight - Scale: 91 2 kg (201 lb) (07/15/20 0646)  SpO2: 99 % (07/15/20 1215)    Physical Exam   Constitutional: She is oriented to person, place, and time  No distress  HENT:   Head: Normocephalic and atraumatic  Eyes: Conjunctivae and EOM are normal    Neck: Normal range of motion  Neck supple  Cardiovascular: Normal rate and regular rhythm  Pulmonary/Chest: Effort normal and breath sounds normal  She has no wheezes  She has no rales  Abdominal: Soft   Bowel sounds are normal  She exhibits no distension  There is no tenderness  Musculoskeletal: Normal range of motion  She exhibits no edema  Neurological: She is alert and oriented to person, place, and time  Skin: Skin is warm and dry  She is not diaphoretic  Additional Data:     Lab Results: I have personally reviewed pertinent reports  Results from last 7 days   Lab Units 07/15/20  0716   WBC Thousand/uL 3 91*   HEMOGLOBIN g/dL 10 5*   HEMATOCRIT % 31 1*   PLATELETS Thousands/uL 205   NEUTROS PCT % 71   LYMPHS PCT % 15   MONOS PCT % 10   EOS PCT % 1     Results from last 7 days   Lab Units 07/15/20  0716  07/14/20  1129   SODIUM mmol/L 137   < > 135*   POTASSIUM mmol/L 4 3   < > 4 0   CHLORIDE mmol/L 113*   < > 109*   CO2 mmol/L 16*   < > 16*   BUN mg/dL 40*   < > 38*   CREATININE mg/dL 3 37*   < > 3 45*   ANION GAP mmol/L 8   < > 10   CALCIUM mg/dL 8 3   < > 8 8   ALBUMIN g/dL  --   --  3 8   TOTAL BILIRUBIN mg/dL  --   --  0 67   ALK PHOS U/L  --   --  72   ALT U/L  --   --  10*   AST U/L  --   --  16   GLUCOSE RANDOM mg/dL 101   < > 120    < > = values in this interval not displayed  Imaging: I have personally reviewed pertinent reports  CT head without contrast   Final Result by Danitza Olson DO (07/15 2198)   No acute intracranial abnormality  Stable presumed intraventricular meningioma  Workstation performed: [id number]             EKG, Pathology, and Other Studies Reviewed on Admission:   · EKG: Flipped T anterior leads    Allscripts / Epic Records Reviewed: Yes     ** Please Note: This note has been constructed using a voice recognition system   **

## 2020-07-15 NOTE — CONSULTS
Consultation - Nephrology   David Hutton 77 y o  female MRN: [id number]  Unit/Bed#: [facility] 722-01 Encounter: [id number]    ASSESSMENT AND PLAN:  Patient is 78-year-old female significant medical issues of CKD stage 4 to five, history of nonfunctional bladder, prior history of bilateral hydronephrosis, status post cystectomy with ileal conduit in 2016, polycythemia vera, history of PE, presented with diarrhea and rash  We are consulted for CKD management  CKD stage 4 to five, baseline creatinine high 2s to low threes, follows with Dr Aarti Wilkinson  -creatinine 3 3 overall closer to baseline  She had somewhat fluctuating creatinine with recently slightly elevated creatinine at 3 4 yesterday could be secondary to loose stool/prerenal/decreased p o  Intake  -will start patient on IV bicarb drip at 75 mL/hour  -BMP in a m   -continue to avoid Valtrex, Bactrim (ever prescribed on 7/14/20, she admits to be taking both yesterday)  -CKD suspected to be secondary to obstructive uropathy, likely functional solitary right kidney  -CT scan of abdomen does not report hydronephrosis, shows atrophic left kidney  Low bicarb, presume hyperchloremic non-anion gap metabolic acidosis in the setting of ileal conduit, advanced renal failure  -serum bicarb 16  -start bicarb drip as above  -patient was not able to tolerate sodium bicarb supplement due to leg swelling issues  Anemia in CKD, hemoglobin overall stable  Continue to closely monitor  Transfuse p r n  For hemoglobin less than seven  Nonfunctional bladder history, status post cystectomy with ileal conduit in 2016  Follows with Urology as an outpatient  Secondary hyperparathyroidism, on calcitriol      Suspected UTI, UA which is ileal conduit specimen shows 3+ proteinuria, concentrated sample, innumerable RBCs/WBCs   -urine culture shows GNR  -currently on antibiotic as per primary team    Altered mental status, management as per primary team, currently active, alert, oriented x3 at the time of my encounter  -?  Metabolic encephalopathy in the setting of suspected UTI, use of Valtrex    Herpes zoster, management as per primary team   Diarrhea, C diff pending    Discussed above plan in detail with primary team     HISTORY OF PRESENT ILLNESS:  Requesting Physician: Isabel Milligan DO  Reason for Consult:  CKD    Zoya Wong is a 77y o  year old female who was admitted to [facility] after presenting with diarrhea, rash  A renal consultation is requested today for assistance in the management of CKD  Old medical records were reviewed  Patient's baseline serum creatinine seems to be fluctuating anywhere from high 2s to low threes  She presented to the ER yesterday due to diarrhea and rash  She was found to have suspected UTI and also was suspected to have herpes zoster  She was given Valtrex and Bactrim and was sent back home  She presented again to the hospital did not feeling well  She denies any recent NSAID use  She denies any chest pain or shortness of breath  She does have ileal conduit and has been having off and on foul smell urine      PAST MEDICAL HISTORY:  Past Medical History:   Diagnosis Date    Anxiety     Chronic kidney disease (CKD), stage IV (severe) (HCC)     stage IV    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    Compression fracture of cervical spine (HCC)     Hydronephrosis     Hypertension     Hypothyroid     Incontinence     Lung mass     Improving on PET/CT 1/2016    [facility]mia vera ([facility] [address]     Pulmonary embolism ([facility] [address] ) 2014    Urinary tract infection        PAST SURGICAL HISTORY:  Past Surgical History:   Procedure Laterality Date    BLADDER SUSPENSION      BOTOX INJECTION N/A 7/27/2016    Procedure: BOTOX INJECTION ;  Surgeon: Marta Gracia MD;  [facility];  Service:     CHOLECYSTECTOMY N/A     COLONOSCOPY      CYSTECTOMY, RADICAL WITH ILEOCONDUIT N/A 10/4/2016    Procedure: Cecile Hernandes CYSTECTOMY WITH ILEAL CONDUIT ;  Surgeon: Alex Greene MD;  [facility];  Service:    Lolly Field W/ RETROGRADES Bilateral 7/27/2016    Procedure: Malaika Martinez; RETROGRADE PYELOGRAM ;  Surgeon: Alex Greene MD;  [facility];  Service:     [place] COLONOSCOPY FLX DX W/COLLJ Nadine 1977 PFRMD N/A 8/31/2016    Procedure: COLONOSCOPY;  Surgeon: Dio Latham MD;  [facility]; Service: Gastroenterology    MI CYSTOSCOPY,INSERT URETERAL STENT Bilateral 7/27/2016    Procedure: STENT INSERTION; EXCISION OF MESH ;  Surgeon: Alex Greene MD;  [facility];  Service: Urology    TONSILLECTOMY      TUBAL LIGATION      WISDOM TOOTH EXTRACTION         ALLERGIES:  Allergies   Allergen Reactions    Chlorhexidine Rash     petichi like rash when using chlorhexidine swabs prior to IV          SOCIAL HISTORY:  Social History     Substance and Sexual Activity   Alcohol Use Not Currently    Frequency: Never    Binge frequency: Never    Comment: n/s     Social History     Substance and Sexual Activity   Drug Use Not Currently    Comment: n/a     Social History     Tobacco Use   Smoking Status Never Smoker   Smokeless Tobacco Never Used   Tobacco Comment    n/a       FAMILY HISTORY:  Family History   Problem Relation Age of Onset    Cancer Mother         small cell cancer     Heart disease Father     COPD Father     Heart disease Brother     Nephrolithiasis Brother        MEDICATIONS:    Current Facility-Administered Medications:     acetaminophen (TYLENOL) tablet 650 mg, 650 mg, Oral, Q6H PRN, Bill Sanchez DO    apixaban (ELIQUIS) tablet 2 5 mg, 2 5 mg, Oral, BID, Bill Sanchez DO    [START ON 7/16/2020] calcitriol (ROCALTROL) capsule 0 25 mcg, 0 25 mcg, Oral, Every Other Day, Bill Sanchez DO    cefTRIAXone (ROCEPHIN) 1,000 mg in dextrose 5 % 50 mL IVPB, 1,000 mg, Intravenous, Q24H, Bill Sanchez DO    cholecalciferol (VITAMIN D3) tablet 1,000 Units, 1,000 Units, Oral, Daily, Devon Aubree Alves,     citalopram (CeleXA) tablet 20 mg, 20 mg, Oral, Daily, Lashell Pinzon, DO    [START ON 7/16/2020] levothyroxine tablet 75 mcg, 75 mcg, Oral, Early Morning, Lashell Pinzon, DO    loperamide (IMODIUM) capsule 2 mg, 2 mg, Oral, 4x Daily PRN, Lashell Pinzon, DO    metoprolol tartrate (LOPRESSOR) tablet 25 mg, 25 mg, Oral, BID, Lashell Pinzon, DO    ruxolitinib (JAKAFI) tablet 10 mg, 10 mg, Oral, Daily, Lashell Pinzon, DO    saccharomyces boulardii (FLORASTOR) capsule 250 mg, 250 mg, Oral, Daily, Lashell Pinzon, DO    vancomycin (VANCOCIN) oral solution 125 mg, 125 mg, Oral, Q12H [facility], Lashell Pinzon, DO    REVIEW OF SYSTEMS:  Complete 10 point review of systems were obtained and discussed in length with the patient  Complete review of systems were negative / unremarkable except mentioned in the HPI section       PHYSICAL EXAM:  Current Weight: Weight - Scale: 91 2 kg (201 lb)  First Weight: Weight - Scale: 91 2 kg (201 lb)  Vitals:    07/15/20 1215   BP: (!) 185/89   Pulse: 70   Resp: 16   Temp:    SpO2: 99%       Intake/Output Summary (Last 24 hours) at 7/15/2020 1559  Last data filed at 7/15/2020 0904  Gross per 24 hour   Intake 500 ml   Output    Net 500 ml     Wt Readings from Last 3 Encounters:   07/15/20 91 2 kg (201 lb)   06/24/20 91 2 kg (201 lb)   01/13/20 89 8 kg (198 lb)     Temp Readings from Last 3 Encounters:   07/15/20 98 5 °F (36 9 °C) (Oral)   07/14/20 97 7 °F (36 5 °C) (Oral)   06/24/20 98 2 °F (36 8 °C) (Oral)     BP Readings from Last 3 Encounters:   07/15/20 (!) 185/89   07/14/20 (!) 179/92   06/24/20 126/82     Pulse Readings from Last 3 Encounters:   07/15/20 70   07/14/20 58   06/24/20 72        Physical Examination:  General:  Lying in bed, no acute distress  Eyes:  Mild conjunctival pallor present  ENT:  External examination of ears and nose unremarkable  Neck:  No obvious lymphadenopathy appreciated  Respiratory:  Bilateral air entry present  CVS:  S1, S2 present  GI:  Soft, nondistended  CNS:  Active alert oriented x3  Extremities:  No significant edema in legs  Psych:  Conscious, coherent, oriented  Skin:  Bruise/erythematous rash    Invasive Devices:      Lab Results:   Results from last 7 days   Lab Units 07/15/20  0716 07/14/20  1333 07/14/20  1129   WBC Thousand/uL 3 91*  --  4 07*   HEMOGLOBIN g/dL 10 5*  --  12 1   HEMATOCRIT % 31 1*  --  36 6   PLATELETS Thousands/uL 205  --  249   POTASSIUM mmol/L 4 3 4 3 4 0   CHLORIDE mmol/L 113* 113* 109*   CO2 mmol/L 16* 17* 16*   BUN mg/dL 40* 37* 38*   CREATININE mg/dL 3 37* 3 11* 3 45*   CALCIUM mg/dL 8 3 8 5 8 8       Other Studies:   CT head without contrast   Final Result by Shwetha Díaz DO (07/15 4906)   No acute intracranial abnormality  Stable presumed intraventricular meningioma  Workstation performed: [id number] scan of abdomen and pelvis without contrast shows atrophic left kidney, multiple calculi in the right kidney, nonobstructing  No bowel obstruction  Portions of the record may have been created with voice recognition software  Occasional wrong word or "sound a like" substitutions may have occurred due to the inherent limitations of voice recognition software  Read the chart carefully and recognize, using context, where substitutions have occurred

## 2020-07-15 NOTE — ASSESSMENT & PLAN NOTE
In anal region  Pt took Valtrex 1 Gm x 2 8 hours apart  In setting of hallucinations stop Valtrex  Pt asymptomatic from this at this time so hold off on antivirals

## 2020-07-15 NOTE — ED ATTENDING ATTESTATION
7/15/2020  I, Cleveland Dean MD, saw and evaluated the patient  I have discussed the patient with the resident/non-physician practitioner and agree with the resident's/non-physician practitioner's findings, Plan of Care, and MDM as documented in the resident's/non-physician practitioner's note, except where noted  All available labs and Radiology studies were reviewed  I was present for key portions of any procedure(s) performed by the resident/non-physician practitioner and I was immediately available to provide assistance  At this point I agree with the current assessment done in the Emergency Department    I have conducted an independent evaluation of this patient a history and physical is as follows:  C/o lightheaded and dizzy   Discharged yesterday from hospital has diarrhea   H/o bowel resection  And sbo in past    Has Zoster rash on right  Dx previously on acyclovir   pmh  CKD   polycythemia vera   dvt  SDH   probable  menigioma   Spinning sensation with position changes   No ha no focal weakness no visual changes   Exam nad perrl eomi no nystagumus   Lungs clear heart rrr no m abd soft pos bs ext no tenderness abd soft  nt nd ileal conduit  ostomey intact   Zoster rash on the right  Neuro cn 2 12 intact motor intact fn hs intact    Imp    Dizziness   ED Course         Critical Care Time  Procedures

## 2020-07-16 LAB
ANION GAP SERPL CALCULATED.3IONS-SCNC: 9 MMOL/L (ref 4–13)
BACTERIA UR CULT: ABNORMAL
BACTERIA UR CULT: ABNORMAL
BUN SERPL-MCNC: 32 MG/DL (ref 5–25)
CALCIUM SERPL-MCNC: 8 MG/DL (ref 8.3–10.1)
CHLORIDE SERPL-SCNC: 109 MMOL/L (ref 100–108)
CO2 SERPL-SCNC: 18 MMOL/L (ref 21–32)
CREAT SERPL-MCNC: 3.26 MG/DL (ref 0.6–1.3)
ERYTHROCYTE [DISTWIDTH] IN BLOOD BY AUTOMATED COUNT: 14.6 % (ref 11.6–15.1)
GFR SERPL CREATININE-BSD FRML MDRD: 13 ML/MIN/1.73SQ M
GLUCOSE SERPL-MCNC: 108 MG/DL (ref 65–140)
HCT VFR BLD AUTO: 31 % (ref 34.8–46.1)
HGB BLD-MCNC: 10.3 G/DL (ref 11.5–15.4)
MAGNESIUM SERPL-MCNC: 2 MG/DL (ref 1.6–2.6)
MCH RBC QN AUTO: 30 PG (ref 26.8–34.3)
MCHC RBC AUTO-ENTMCNC: 33.2 G/DL (ref 31.4–37.4)
MCV RBC AUTO: 90 FL (ref 82–98)
PHOSPHATE SERPL-MCNC: 2 MG/DL (ref 2.3–4.1)
PLATELET # BLD AUTO: 199 THOUSANDS/UL (ref 149–390)
PMV BLD AUTO: 10.3 FL (ref 8.9–12.7)
POTASSIUM SERPL-SCNC: 4 MMOL/L (ref 3.5–5.3)
PROCALCITONIN SERPL-MCNC: 0.31 NG/ML
RBC # BLD AUTO: 3.43 MILLION/UL (ref 3.81–5.12)
SODIUM SERPL-SCNC: 136 MMOL/L (ref 136–145)
T4 FREE SERPL-MCNC: 1.34 NG/DL (ref 0.76–1.46)
TSH SERPL DL<=0.05 MIU/L-ACNC: 4.2 UIU/ML (ref 0.36–3.74)
WBC # BLD AUTO: 3.34 THOUSAND/UL (ref 4.31–10.16)

## 2020-07-16 PROCEDURE — 99232 SBSQ HOSP IP/OBS MODERATE 35: CPT | Performed by: INTERNAL MEDICINE

## 2020-07-16 PROCEDURE — 84443 ASSAY THYROID STIM HORMONE: CPT | Performed by: GENERAL PRACTICE

## 2020-07-16 PROCEDURE — 87493 C DIFF AMPLIFIED PROBE: CPT | Performed by: GENERAL PRACTICE

## 2020-07-16 PROCEDURE — 80048 BASIC METABOLIC PNL TOTAL CA: CPT | Performed by: GENERAL PRACTICE

## 2020-07-16 PROCEDURE — 84145 PROCALCITONIN (PCT): CPT | Performed by: GENERAL PRACTICE

## 2020-07-16 PROCEDURE — 84100 ASSAY OF PHOSPHORUS: CPT | Performed by: GENERAL PRACTICE

## 2020-07-16 PROCEDURE — 83735 ASSAY OF MAGNESIUM: CPT | Performed by: GENERAL PRACTICE

## 2020-07-16 PROCEDURE — 84439 ASSAY OF FREE THYROXINE: CPT | Performed by: GENERAL PRACTICE

## 2020-07-16 PROCEDURE — 85027 COMPLETE CBC AUTOMATED: CPT | Performed by: GENERAL PRACTICE

## 2020-07-16 PROCEDURE — 99232 SBSQ HOSP IP/OBS MODERATE 35: CPT | Performed by: GENERAL PRACTICE

## 2020-07-16 RX ORDER — NIFEDIPINE 30 MG/1
30 TABLET, EXTENDED RELEASE ORAL DAILY
Status: DISCONTINUED | OUTPATIENT
Start: 2020-07-16 | End: 2020-07-19 | Stop reason: HOSPADM

## 2020-07-16 RX ADMIN — DIBASIC SODIUM PHOSPHATE, MONOBASIC POTASSIUM PHOSPHATE AND MONOBASIC SODIUM PHOSPHATE 1 TABLET: 852; 155; 130 TABLET ORAL at 18:37

## 2020-07-16 RX ADMIN — DIBASIC SODIUM PHOSPHATE, MONOBASIC POTASSIUM PHOSPHATE AND MONOBASIC SODIUM PHOSPHATE 1 TABLET: 852; 155; 130 TABLET ORAL at 11:15

## 2020-07-16 RX ADMIN — APIXABAN 2.5 MG: 2.5 TABLET, FILM COATED ORAL at 18:37

## 2020-07-16 RX ADMIN — NIFEDIPINE 30 MG: 30 TABLET, FILM COATED, EXTENDED RELEASE ORAL at 11:15

## 2020-07-16 RX ADMIN — Medication 125 MG: at 08:01

## 2020-07-16 RX ADMIN — CALCITRIOL 0.25 MCG: 0.25 CAPSULE, LIQUID FILLED ORAL at 08:01

## 2020-07-16 RX ADMIN — RUXOLITINIB 10 MG: 10 TABLET ORAL at 07:59

## 2020-07-16 RX ADMIN — CITALOPRAM HYDROBROMIDE 20 MG: 20 TABLET ORAL at 07:58

## 2020-07-16 RX ADMIN — MELATONIN 1000 UNITS: at 07:58

## 2020-07-16 RX ADMIN — METOPROLOL TARTRATE 25 MG: 25 TABLET, FILM COATED ORAL at 18:37

## 2020-07-16 RX ADMIN — Medication 250 MG: at 07:57

## 2020-07-16 RX ADMIN — CEFTRIAXONE SODIUM 1000 MG: 10 INJECTION, POWDER, FOR SOLUTION INTRAVENOUS at 18:38

## 2020-07-16 RX ADMIN — LEVOTHYROXINE SODIUM 75 MCG: 75 TABLET ORAL at 05:21

## 2020-07-16 RX ADMIN — Medication 125 MG: at 20:57

## 2020-07-16 RX ADMIN — APIXABAN 2.5 MG: 2.5 TABLET, FILM COATED ORAL at 07:58

## 2020-07-16 RX ADMIN — METOPROLOL TARTRATE 25 MG: 25 TABLET, FILM COATED ORAL at 06:23

## 2020-07-16 NOTE — ASSESSMENT & PLAN NOTE
Estimated Creatinine Clearance: 15 2 mL/min (A) (by C-G formula based on SCr of 3 26 mg/dL (H))    Cr appears to be at baseline low 3s  Sees Dr Leah Harris outpt  Renal appreciated  C/w bicarb gtt

## 2020-07-16 NOTE — UTILIZATION REVIEW
Initial Clinical Review    Admission: Date/Time/Statement: Admission Orders (From admission, onward)     Ordered        07/15/20 0927  Inpatient Admission  Once                   Orders Placed This Encounter   Procedures    Inpatient Admission     Standing Status:   Standing     Number of Occurrences:   1     Order Specific Question:   Admitting Physician     Answer:   Dino Fowler [[id number]]     Order Specific Question:   Level of Care     Answer:   Med Surg [16]     Order Specific Question:   Estimated length of stay     Answer:   More than 2 Midnights     Order Specific Question:   Certification     Answer:   I certify that inpatient services are medically necessary for this patient for a duration of greater than two midnights  See H&P and MD Progress Notes for additional information about the patient's course of treatment  ED Arrival Information     Expected Arrival Acuity Means of Arrival Escorted By Service Admission Type    - 7/15/2020 06:41 Urgent Ambulance [facility] Hospitalist Urgent    Arrival Complaint    Syncope        Chief Complaint   Patient presents with    Dizziness     pt states that she was here yesterday for shingles and took "new medications" now pt states when she stands up she gets confused and dizzy     HPI:   77 y o  female w/ CKD4-5, cystectomy with ileal conduit,chronic PE on Eliquis, C diff who presented to ER from home with diarrhea and rash  Patient was seen in the ED on 7/14, diagnosed with shingles and UTI  D/c on Immodium, Bactrim, and Valtrex  Pt took 2 doses of Valtrex and c/o hallucinations and dizziness   Serum bicarb 16  Physical exam: A&O x3      7/15 Plan: Inpatient Med Surg admission for evaluation and treatment of toxic metabolic encephalopathy, metabolic acidosis,  Herpes zoster, UTI and diarrhea:  Stop Valtrex, stop Bactrim, place on IV Rocephin, consult Nephrology  Stool for C diff, start vancomycin prophylaxis       7/15 Nephrology consult: CKD suspected to be secondary to obstructive uropathy, likely functional solitary right kidney  Low bicarb, presume hyperchloremic non-anion gap metabolic acidosis in the setting of ileal conduit, advanced renal failure,  Start IV bicarb drip at 75 ml/hour  Patient was not able to tolerate sodium bicarb supplement due to leg swelling issues in the past   Continue to hold Valtrex  7/16 Nephrology:  Serum bicarb now slowly improving to 18  Continue IV bicarb drip at 75 mL/hour, BMP in a m , continue to avoid Valtrex and Bactrim  Hypophosphatemia, serum phosphorus 2 0, will start patient on Neutra-Phos one packet p o  B i d  For next 3 days  Hypertension, BP uncontrolled, currently on metoprolol 25 mg b i d , will start patient on nifedipine XL 30 mg daily with holding parameter  Urine culture shows GNR, currently on IV antibiotic  Diarrhea has improved   Alert and oriented x3         ED Triage Vitals   Temperature Pulse Respirations Blood Pressure SpO2   07/15/20 0714 07/15/20 0646 07/15/20 0646 07/15/20 0646 07/15/20 0646   98 5 °F (36 9 °C) 70 18 154/76 99 %      Temp Source Heart Rate Source Patient Position - Orthostatic VS BP Location FiO2 (%)   07/15/20 0714 07/15/20 0730 07/15/20 0730 07/15/20 0730 --   Oral Monitor Lying Right arm       Pain Score       07/15/20 0646       3        Wt Readings from Last 1 Encounters:   07/15/20 91 2 kg (201 lb)     Additional Vital Signs:     Date/Time  Temp  Pulse  Resp  BP  MAP (mmHg)  SpO2  O2 Device   07/16/20 12:11:41  98 1 °F (36 7 °C)    17  145/91  109       07/16/20 11:17:15        172/107Abnormal   129       07/16/20 08:21:39        173/102Abnormal   126       07/16/20 07:59:31      17  192/122Abnormal   145       07/16/20 05:06:53        173/109Abnormal   130       07/15/20 2015  98 7 °F (37 1 °C)  79  20  185/95Abnormal     98 %  None (Room air)   07/15/20 1700  98 7 °F (37 1 °C)  76  20  195/100Abnormal     97 %  None (Room air)   07/15/20 1215    70  16  185/89Abnormal     99 %  None (Room air)   07/15/20 0915    76  16  175/89Abnormal   124  96 %  None (Room air)   07/15/20 0730    64  16  167/78  112  98 %  None (Room air)         Pertinent Labs/Diagnostic Test Results:     7/15 CT head:  No acute intracranial abnormality  Stable presumed intraventricular meningioma  7/14 CT AP: No acute infiltrates descending  There is no bowel obstruction  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  Again noted is a cystic mass in the right iliac fossa which may postoperative or may be of for adnexal region, stable, can be related with ultrasound  Right renal calculus  Atrophic left kidney  7/15 EKG:  Normal sinus rhythm  Nonspecific ST and T wave abnormality  Prolonged QT  Abnormal ECG  When compared with ECG of 15-JUL-2020 06:59, No significant change was found        Results from last 7 days   Lab Units 07/16/20  0519 07/15/20  0716 07/14/20  1129   WBC Thousand/uL 3 34* 3 91* 4 07*   HEMOGLOBIN g/dL 10 3* 10 5* 12 1   HEMATOCRIT % 31 0* 31 1* 36 6   PLATELETS Thousands/uL 199 205 249   NEUTROS ABS Thousands/µL  --  2 80 2 81         Results from last 7 days   Lab Units 07/16/20  0519 07/15/20  0716 07/14/20  1333 07/14/20  1129   SODIUM mmol/L 136 137 137 135*   POTASSIUM mmol/L 4 0 4 3 4 3 4 0   CHLORIDE mmol/L 109* 113* 113* 109*   CO2 mmol/L 18* 16* 17* 16*   ANION GAP mmol/L 9 8 7 10   BUN mg/dL 32* 40* 37* 38*   CREATININE mg/dL 3 26* 3 37* 3 11* 3 45*   EGFR ml/min/1 73sq m 13 13 14 12   CALCIUM mg/dL 8 0* 8 3 8 5 8 8   MAGNESIUM mg/dL 2 0  --   --   --    PHOSPHORUS mg/dL 2 0*  --   --   --      Results from last 7 days   Lab Units 07/14/20  1129   AST U/L 16   ALT U/L 10*   ALK PHOS U/L 72   TOTAL PROTEIN g/dL 7 4   ALBUMIN g/dL 3 8   TOTAL BILIRUBIN mg/dL 0 67         Results from last 7 days   Lab Units 07/16/20  0519 07/15/20  0716 07/14/20  1333 07/14/20  1129   GLUCOSE RANDOM mg/dL 108 101 98 120 Results from last 7 days   Lab Units 07/14/20  1236   PH OLIVER  7 298*   PCO2 OLIVER mm Hg 33 8*   PO2 OLIVER mm Hg 37 2   HCO3 OLIVER mmol/L 16 2*   BASE EXC OLIVER mmol/L -9 3   O2 CONTENT OLIVER ml/dL 10 8   O2 HGB, VENOUS % 70 4             Results from last 7 days   Lab Units 07/15/20  1016 07/15/20  0716   TROPONIN I ng/mL <0 02 <0 02             Results from last 7 days   Lab Units 07/16/20  0519   TSH 3RD GENERATON uIU/mL 4 200*     Results from last 7 days   Lab Units 07/16/20  0519   PROCALCITONIN ng/ml 0 31*       Results from last 7 days   Lab Units 07/14/20  1129   LIPASE u/L 289             Results from last 7 days   Lab Units 07/14/20  1139   CLARITY UA  Turbid   COLOR UA  Red   SPEC GRAV UA  1 020   PH UA  6 5   GLUCOSE UA mg/dl Negative   KETONES UA mg/dl Trace*   BLOOD UA  Large*   PROTEIN UA mg/dl >=300*   NITRITE UA  Positive*   BILIRUBIN UA  Interference- unable to analyze*   UROBILINOGEN UA E U /dl 0 2   LEUKOCYTES UA  Large*   WBC UA /hpf Innumerable*   RBC UA /hpf Innumerable*   BACTERIA UA /hpf Field obscured, unable to enumerate*   EPITHELIAL CELLS WET PREP /hpf Field obscured, unable to enumerate*   SODIUM UR   --    CREATININE UR mg/dL  --          Results from last 7 days   Lab Units 07/14/20  1139   URINE CULTURE  >100,000 cfu/ml Escherichia coli*  20,000-29,000 cfu/ml                ED Treatment:   Medication Administration from 07/15/2020 0641 to 07/15/2020 1441       Date/Time Order Dose Route Action     07/15/2020 0752 multi-electrolyte (ISOLYTE-S PH 7 4) bolus 500 mL 500 mL Intravenous New Bag     07/15/2020 0752 diazepam (VALIUM) injection 2 5 mg 2 5 mg Intravenous Given     07/15/2020 0905 meclizine (ANTIVERT) tablet 12 5 mg 12 5 mg Oral Given        Past Medical History:   Diagnosis Date    Anxiety     Chronic kidney disease (CKD), stage IV (severe) (HCC)     stage IV    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    Compression fracture of cervical spine ([address] )     Hydronephrosis     Hypertension     Hypothyroid     Incontinence     Lung mass     Improving on PET/CT 1/2016    Polycythemia vera (HCC)     Pulmonary embolism ([facility] ) 2014    Urinary tract infection      Present on Admission:   Stage 5 chronic kidney disease not on chronic dialysis (HCC)   Acidosis, metabolic   Chronic saddle pulmonary embolism (HCC)   Diarrhea of presumed infectious origin      Admitting Diagnosis: Hallucinations [R44 3]  Shingles [B02 9]  Dizziness [R42]  Syncope [U42]  Metabolic acidosis [T53 8]  Meningioma (HCC) [D32 9]  T wave inversion in EKG [R94 31]  Stage 4 chronic kidney disease ([facility] ) [N18 4]  Age/Sex: 77 y o  female     Admission Orders:    Scheduled Medications:    Medications:  apixaban 2 5 mg Oral BID   calcitriol 0 25 mcg Oral Every Other Day   cefTRIAXone 1,000 mg Intravenous Q24H   cholecalciferol 1,000 Units Oral Daily   citalopram 20 mg Oral Daily   levothyroxine 75 mcg Oral Early Morning   metoprolol tartrate 25 mg Oral BID   NIFEdipine 30 mg Oral Daily   potassium-sodium phosphateS 1 tablet Oral BID With Meals   ruxolitinib 10 mg Oral Daily   saccharomyces boulardii 250 mg Oral Daily   vancomycin 125 mg Oral Q12H [facility]     Continuous IV Infusions:    sodium bicarbonate 150 mEq in dextrose 5 % 1,000 mL infusion   Rate: 75 mL/hr Dose: 75 mL/hr  Freq: Continuous Route: IV  Last Dose: 75 mL/hr (07/16/20 0900)  Start: 07/15/20 1615        PRN Meds:    acetaminophen 650 mg Oral Q6H PRN   loperamide 2 mg Oral 4x Daily PRN       IP CONSULT TO NEPHROLOGY            Network Utilization Review Department  [email or web address]  ATTENTION: Please call with any questions or concerns to [phone] and carefully listen to the prompts so that you are directed to the right person   All voicemails are confidential   Lora Sadler all requests for admission clinical reviews, approved or denied determinations and any other requests to dedicated fax number below belonging to the campus where the patient is receiving treatment   List of dedicated fax numbers for the Facilities:  [address] [place] [address] DENIALS (Administrative/Medical Necessity) [phone]   [address]  (Maternity/NICU/Pediatrics) [phone]   Nikki Duvall [phone]   Prasanna Roper [phone]   [facility] [phone]   [facility] [phone]   [address] [place] [address] [phone]   [facility]  [phone]   [address] [facility]  [address] [facility] [address] [facility] [phone]

## 2020-07-16 NOTE — ASSESSMENT & PLAN NOTE
Pt has ileal conduit  Stop Bactrim in setting of CKD5  Will put on Rocephin - Tomorrow will de-escalate to Ancef/Keflex renally dosed  Vanco prophylaxis  Cx > 100,000 E coli pansensitive  Procal mildly elevated in pt with CKD

## 2020-07-16 NOTE — PROGRESS NOTES
Progress Note - Nadja Ram 1945, 77 y o  female MRN: [id number]    Unit/Bed#: [facility] 722-01 Encounter: [id number]    Primary Care Provider: Abeba Singleton MD   Date and time admitted to hospital: 7/15/2020  6:41 AM        * Toxic metabolic encephalopathy  Assessment & Plan  Likely 2/2 Valtrex toxicity in setting of CKD5  Stop Valtrex  Improving      Inverted T wave  Assessment & Plan  No CP and neg trop  No need for further w/u at this time    Herpes zoster  Assessment & Plan  In anal region  Pt took Valtrex 1 Gm x 2 8 hours apart  In setting of hallucinations stop Valtrex  Pt asymptomatic from this at this time so hold off on antivirals      UTI (urinary tract infection)  Assessment & Plan  Pt has ileal conduit  Stop Bactrim in setting of CKD5  Will put on Rocephin - Tomorrow will de-escalate to Ancef/Keflex renally dosed  Vanco prophylaxis  Cx > 100,000 E coli pansensitive  Procal mildly elevated in pt with CKD    Stage 5 chronic kidney disease not on chronic dialysis ([facility] )  Assessment & Plan  Estimated Creatinine Clearance: 15 2 mL/min (A) (by C-G formula based on SCr of 3 26 mg/dL (H))  Cr appears to be at baseline low 3s  Sees Dr Garrett Russo outpt  Renal appreciated  C/w bicarb gtt    Acidosis, metabolic  Assessment & Plan  2/2 CKD  Does not tolerate Bicarb tabs  Renal consult appreciated - started on bicarb gtt    Diarrhea of presumed infectious origin  Assessment & Plan  POA  C diff pending  Start Vanco prophylaxis in pt w/ hx C diff    Chronic saddle pulmonary embolism (HCC)  Assessment & Plan  C/w Eliquis low dose (in setting of CKD5)    VTE Pharmacologic Prophylaxis:   Pharmacologic: Apixaban (Eliquis)  Mechanical VTE Prophylaxis in Place: Yes    Patient Centered Rounds: I have performed bedside rounds with nursing staff today      Discussions with Specialists or Other Care Team Provider: no    Education and Discussions with Family / Patient: pt   Declined call to family    Time Spent for Care: 30 minutes  More than 50% of total time spent on counseling and coordination of care as described above  Current Length of Stay: 1 day(s)    Current Patient Status: Inpatient   Certification Statement: The patient will continue to require additional inpatient hospital stay due to need for bicarb gtt    Discharge Plan: pending pt/ot when ok from renal standpoint    Code Status: Level 1 - Full Code      Subjective:   Feeling better    Objective:     Vitals:   Temp (24hrs), Av 4 °F (36 9 °C), Min:98 1 °F (36 7 °C), Max:98 7 °F (37 1 °C)    Temp:  [98 1 °F (36 7 °C)-98 7 °F (37 1 °C)] 98 4 °F (36 9 °C)  HR:  [78-79] 78  Resp:  [17-20] 20  BP: (145-192)/() 149/91  SpO2:  [96 %-98 %] 96 %  Body mass index is 39 26 kg/m²  Input and Output Summary (last 24 hours): Intake/Output Summary (Last 24 hours) at 2020 1929  Last data filed at 2020 1801  Gross per 24 hour   Intake 880 ml   Output 1650 ml   Net -770 ml       Physical Exam:     Physical Exam   Constitutional: She is oriented to person, place, and time  No distress  HENT:   Head: Normocephalic and atraumatic  Eyes: Conjunctivae and EOM are normal    Neck: Normal range of motion  Neck supple  Cardiovascular: Normal rate and regular rhythm  Pulmonary/Chest: Effort normal and breath sounds normal  She has no wheezes  She has no rales  Abdominal: Soft  Bowel sounds are normal  She exhibits no distension  There is no tenderness  Musculoskeletal: Normal range of motion  She exhibits no edema  Neurological: She is alert and oriented to person, place, and time  Skin: Skin is warm and dry  She is not diaphoretic         Additional Data:     Labs:    Results from last 7 days   Lab Units 20  0519 07/15/20  0716   WBC Thousand/uL 3 34* 3 91*   HEMOGLOBIN g/dL 10 3* 10 5*   HEMATOCRIT % 31 0* 31 1*   PLATELETS Thousands/uL 199 205   NEUTROS PCT %  --  71   LYMPHS PCT %  --  15   MONOS PCT %  --  10   EOS PCT %  --  1     Results from last 7 days   Lab Units 07/16/20  0519  07/14/20  1129   POTASSIUM mmol/L 4 0   < > 4 0   CHLORIDE mmol/L 109*   < > 109*   CO2 mmol/L 18*   < > 16*   BUN mg/dL 32*   < > 38*   CREATININE mg/dL 3 26*   < > 3 45*   CALCIUM mg/dL 8 0*   < > 8 8   ALK PHOS U/L  --   --  72   ALT U/L  --   --  10*   AST U/L  --   --  16    < > = values in this interval not displayed  * I Have Reviewed All Lab Data Listed Above  * Additional Pertinent Lab Tests Reviewed: [address] Admission Reviewed        Recent Cultures (last 7 days):     Results from last 7 days   Lab Units 07/14/20  1139   URINE CULTURE  >100,000 cfu/ml Escherichia coli*  20,000-29,000 cfu/ml        Last 24 Hours Medication List:     Current Facility-Administered Medications:  acetaminophen 650 mg Oral Q6H PRN Brigitte Larry DO    apixaban 2 5 mg Oral BID Brigitte Larry,     calcitriol 0 25 mcg Oral Every Other Day Brigitte Larry,     cefTRIAXone 1,000 mg Intravenous Q24H Brigitte Larry DO Last Rate: 1,000 mg (07/16/20 1838)   cholecalciferol 1,000 Units Oral Daily Brigitte Larry,     citalopram 20 mg Oral Daily Brigitte Larry, DO    levothyroxine 75 mcg Oral Early Morning Brigitte Larry,     loperamide 2 mg Oral 4x Daily PRN Brigitte Larry,     metoprolol tartrate 25 mg Oral BID Brigitte Larry,     NIFEdipine 30 mg Oral Daily Nikolay Baker MD    potassium-sodium phosphateS 1 tablet Oral BID With Meals Blayne Bland MD    ruxolitinib 10 mg Oral Daily Brigitte Larry,     saccharomyces boulardii 250 mg Oral Daily Brigitte Larry DO    sodium bicarbonate infusion 75 mL/hr Intravenous Continuous Nikolay Baker MD Last Rate: 75 mL/hr (07/16/20 0900)   vancomycin 125 mg Oral Q12H 221 Ej Amin DO         Today, Patient Was Seen By: Brigitte Larry DO    ** Please Note: Dictation voice to text software may have been used in the creation of this document   **

## 2020-07-16 NOTE — PLAN OF CARE
Problem: Potential for Falls  Goal: Patient will remain free of falls  Description  INTERVENTIONS:  - Assess patient frequently for physical needs  -  Identify cognitive and physical deficits and behaviors that affect risk of falls    -  Crump fall precautions as indicated by assessment   - Educate patient/family on patient safety including physical limitations  - Instruct patient to call for assistance with activity based on assessment  - Modify environment to reduce risk of injury  - Consider OT/PT consult to assist with strengthening/mobility  Outcome: Progressing

## 2020-07-16 NOTE — UTILIZATION REVIEW
Notification of Inpatient Admission/Inpatient Authorization Request   This is a Notification of Inpatient Admission for [address] Akiko Gary  Be advised that this patient was admitted to our facility under Inpatient Status  Contact Holli Chow at [phone] for additional admission information  [address] [place] [address] DEPT  DEDICATED [phone]  Patient Name:   Sharon Granda   YOB: 1945       [address]   [address]:   Cecile Soto  Tax ID: [id number]  NPI: [id number] Attending Provider/NPI:  Phone:  Address: Aubrie Recio [[id number]]  [phone]  Same as Facility   Place of Service Code: 24 Place of Service Name:  [address] [place] [address]   Start Date: 7/15/20 2142 Discharge Date & Time: No discharge date for patient encounter  Type of Admission: Inpatient Status Discharge Disposition (if discharged): Home/Self Care   Patient Diagnoses: Hallucinations [R44 3]  Shingles [B02 9]  Dizziness [R42]  Syncope [Z84]  Metabolic acidosis [U63 9]  Meningioma ([facility] ) [D32 9]  T wave inversion in EKG [R94 31]  Stage 4 chronic kidney disease ([facility] ) [N18 4]     Orders: Admission Orders (From admission, onward)     Ordered        07/15/20 0927  Inpatient Admission  Once                    Assigned Utilization Review Contact: Holli Chow  Utilization ,   Network Utilization Review Department  Phone: [phone]; Fax [phone]   Email: [email or web address]   ATTENTION PAYERS: Please call the assigned Utilization  directly with any questions or concerns ALL voicemails in the department are confidential  Send all requests for admission clinical reviews, approved or denied determinations and any other requests to dedicated fax number belonging to the campus where the patient is receiving treatment

## 2020-07-16 NOTE — PROGRESS NOTES
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, history of nonfunctional bladder, prior history of bilateral hydronephrosis, status post cystectomy with ileal conduit in 2016, polycythemia vera, history of PE, presented with diarrhea and rash  We are consulted for CKD management      CKD stage 4 to 5, baseline creatinine high 2s to low 3s, follows with Dr Mercedez Rivas  -creatinine 3 2 overall stable close to baseline    -continue IV bicarb drip at 75 mL/hour  -BMP in a m   -continue to avoid Valtrex, Bactrim (she admits to be taking both on 7/14/20)  -CKD suspected to be secondary to obstructive uropathy, likely functional solitary right kidney  -CT scan of abdomen does not report hydronephrosis, shows atrophic left kidney      Low bicarb, presume hyperchloremic non-anion gap metabolic acidosis in the setting of ileal conduit, advanced renal failure  -serum bicarb now slowly improving to 18  -bicarb drip as above  -patient was not able to tolerate sodium bicarb supplement due to leg swelling issues in the past     Anemia in CKD, hemoglobin overall stable  Continue to closely monitor  Transfuse p r n  For hemoglobin less than seven      Nonfunctional bladder history, status post cystectomy with ileal conduit in 2016  Follows with Urology as an outpatient      Secondary hyperparathyroidism, on calcitriol  Hypophosphatemia, serum phosphorus 2 0, will start patient on Neutra-Phos one packet p o  B i d  For next 3 days      Hypertension, BP uncontrolled  -currently on metoprolol 25 mg b i d , will start patient on nifedipine XL 30 mg daily with holding parameter     Suspected UTI, UA which is ileal conduit specimen shows 3+ proteinuria, concentrated sample, innumerable RBCs/WBCs   -urine culture shows GNR  -currently on antibiotic as per primary team     Altered mental status, seems to have improved at the time of my encounter today  management as per primary team  -? Metabolic encephalopathy in the setting of suspected UTI, use of Valtrex     Herpes zoster, management as per primary team   Diarrhea, C diff being ruled out     Discussed above plan in detail with primary team     SUBJECTIVE:  Patient seen and examined at bedside  No chest pain, shortness of breath, nausea, vomiting, abdominal pain    Diarrhea has improved    OBJECTIVE:  Current Weight: Weight - Scale: 91 2 kg (201 lb)  Vitals:    07/16/20 0821   BP: (!) 173/102   Pulse:    Resp:    Temp:    SpO2:        Intake/Output Summary (Last 24 hours) at 7/16/2020 0935  Last data filed at 7/16/2020 0201  Gross per 24 hour   Intake    Output 750 ml   Net -750 ml     Wt Readings from Last 3 Encounters:   07/15/20 91 2 kg (201 lb)   06/24/20 91 2 kg (201 lb)   01/13/20 89 8 kg (198 lb)     Temp Readings from Last 3 Encounters:   07/15/20 98 7 °F (37 1 °C) (Oral)   07/14/20 97 7 °F (36 5 °C) (Oral)   06/24/20 98 2 °F (36 8 °C) (Oral)     BP Readings from Last 3 Encounters:   07/16/20 (!) 173/102   07/14/20 (!) 179/92   06/24/20 126/82     Pulse Readings from Last 3 Encounters:   07/15/20 79   07/14/20 58   06/24/20 72        Physical Examination:  General:  Lying in bed, no acute distress   Eyes:  Mild conjunctival pallor present  ENT:  External examination of ears and nose unremarkable  Neck:  No obvious lymphadenopathy appreciated  Respiratory:  Bilateral air entry present  CVS:  S1, S2 present  GI:  Soft, nontender, nondistended  CNS:  Active alert oriented x3 at the time of my encounter  Musculoskeletal:  No obvious gross deformity present    Medications:    Current Facility-Administered Medications:     acetaminophen (TYLENOL) tablet 650 mg, 650 mg, Oral, Q6H PRN, Blayne Kaufman DO    apixaban (ELIQUIS) tablet 2 5 mg, 2 5 mg, Oral, BID, Blayne Kaufman, DO, 2 5 mg at 07/16/20 0893    calcitriol (ROCALTROL) capsule 0 25 mcg, 0 25 mcg, Oral, Every Other Day, [employer], DO, 0 25 mcg at 07/16/20 0801    cefTRIAXone (ROCEPHIN) 1,000 mg in dextrose 5 % 50 mL IVPB, 1,000 mg, Intravenous, Q24H, [employer], DO, Last Rate: 100 mL/hr at 07/15/20 1733, 1,000 mg at 07/15/20 1733    cholecalciferol (VITAMIN D3) tablet 1,000 Units, 1,000 Units, Oral, Daily, [employer], DO, 1,000 Units at 07/16/20 0758    citalopram (CeleXA) tablet 20 mg, 20 mg, Oral, Daily, [employer], DO, 20 mg at 07/16/20 6680    levothyroxine tablet 75 mcg, 75 mcg, Oral, Early Morning, [employer], DO, 75 mcg at 07/16/20 7619    loperamide (IMODIUM) capsule 2 mg, 2 mg, Oral, 4x Daily PRN, [employer], DO    metoprolol tartrate (LOPRESSOR) tablet 25 mg, 25 mg, Oral, BID, [employer], DO, 25 mg at 07/16/20 4367    ruxolitinib (JAKAFI) tablet 10 mg, 10 mg, Oral, Daily, [employer], DO, 10 mg at 07/16/20 6455    saccharomyces boulardii (FLORASTOR) capsule 250 mg, 250 mg, Oral, Daily, [employer], DO, 250 mg at 07/16/20 0757    sodium bicarbonate 150 mEq in dextrose 5 % 1,000 mL infusion, 75 mL/hr, Intravenous, Continuous, Elizabeth Peñaloza MD, Stopped at 07/16/20 0603    vancomycin (VANCOCIN) oral solution 125 mg, 125 mg, Oral, Q12H [address], [employer], DO, 125 mg at 07/16/20 0801    Laboratory Results:  Results from last 7 days   Lab Units 07/16/20  0519 07/15/20  0716 07/14/20  1333 07/14/20  1129   WBC Thousand/uL 3 34* 3 91*  --  4 07*   HEMOGLOBIN g/dL 10 3* 10 5*  --  12 1   HEMATOCRIT % 31 0* 31 1*  --  36 6   PLATELETS Thousands/uL 199 205  --  249   SODIUM mmol/L 136 137 137 135*   POTASSIUM mmol/L 4 0 4 3 4 3 4 0   CHLORIDE mmol/L 109* 113* 113* 109*   CO2 mmol/L 18* 16* 17* 16*   BUN mg/dL 32* 40* 37* 38*   CREATININE mg/dL 3 26* 3 37* 3 11* 3 45*   CALCIUM mg/dL 8 0* 8 3 8 5 8 8   MAGNESIUM mg/dL 2 0  --   --   --    PHOSPHORUS mg/dL 2 0*  --   --   --        CT head without contrast   Final Result by Abi Lo DO (07/15 0426)   No acute intracranial abnormality  Stable presumed intraventricular meningioma  Workstation performed: [id number]             Portions of the record may have been created with voice recognition software  Occasional wrong word or "sound a like" substitutions may have occurred due to the inherent limitations of voice recognition software  Read the chart carefully and recognize, using context, where substitutions have occurred

## 2020-07-16 NOTE — SOCIAL WORK
LOS 1  Not a bundle  Met with patient and son, Spenser Dave  [phone] at bedside to discuss CM role and dcp  [place] is emergency contact and POA  Pt has living will  Pt is alert and oriented at at time of meeting  Pt lives with son, Carly Sahni who has autism but is very high functioning  They live in a 2 story home w/ 6-7 MARLON and 12 steps to bedroom and bathroom  Pt independent with all adl's and care of ileolconduit pta  She has a walker and cane but presently is not using  She drives  No prior VN or rehab  Denies h/o mental health and drug/ETOH treatment  PCP: Dr Almeda Claude is [facility] on [address]  Son will transport upon d/c  CM reviewed d/c planning process including the following: identifying help at home, patient preference for d/c planning needs, Discharge Lounge, [facility] Meds to Bed program, availability of treatment team to discuss questions or concerns patient and/or family may have regarding understanding medications and recognizing signs and symptoms once discharged  CM also encouraged patient to follow up with all recommended appointments after discharge  Patient advised of importance for patient and family to participate in managing patients medical well being  Patient/caregiver received discharge checklist   Content reviewed  Patient/caregiver encouraged to participate in discharge plan of care prior to discharge home

## 2020-07-17 ENCOUNTER — APPOINTMENT (INPATIENT)
Dept: RADIOLOGY | Facility: HOSPITAL | Age: 75
DRG: 092 | End: 2020-07-17
Attending: GENERAL PRACTICE
Payer: COMMERCIAL

## 2020-07-17 LAB
ANION GAP SERPL CALCULATED.3IONS-SCNC: 8 MMOL/L (ref 4–13)
BASOPHILS # BLD AUTO: 0.03 THOUSANDS/ΜL (ref 0–0.1)
BASOPHILS NFR BLD AUTO: 1 % (ref 0–1)
BUN SERPL-MCNC: 29 MG/DL (ref 5–25)
C DIFF TOX GENS STL QL NAA+PROBE: NEGATIVE
CALCIUM SERPL-MCNC: 7.9 MG/DL (ref 8.3–10.1)
CHLORIDE SERPL-SCNC: 111 MMOL/L (ref 100–108)
CO2 SERPL-SCNC: 25 MMOL/L (ref 21–32)
CREAT SERPL-MCNC: 3.17 MG/DL (ref 0.6–1.3)
EOSINOPHIL # BLD AUTO: 0.04 THOUSAND/ΜL (ref 0–0.61)
EOSINOPHIL NFR BLD AUTO: 1 % (ref 0–6)
ERYTHROCYTE [DISTWIDTH] IN BLOOD BY AUTOMATED COUNT: 14.9 % (ref 11.6–15.1)
GFR SERPL CREATININE-BSD FRML MDRD: 14 ML/MIN/1.73SQ M
GLUCOSE SERPL-MCNC: 94 MG/DL (ref 65–140)
HCT VFR BLD AUTO: 31.4 % (ref 34.8–46.1)
HGB BLD-MCNC: 10.5 G/DL (ref 11.5–15.4)
IMM GRANULOCYTES # BLD AUTO: 0.03 THOUSAND/UL (ref 0–0.2)
IMM GRANULOCYTES NFR BLD AUTO: 1 % (ref 0–2)
LYMPHOCYTES # BLD AUTO: 1.28 THOUSANDS/ΜL (ref 0.6–4.47)
LYMPHOCYTES NFR BLD AUTO: 29 % (ref 14–44)
MCH RBC QN AUTO: 30.5 PG (ref 26.8–34.3)
MCHC RBC AUTO-ENTMCNC: 33.4 G/DL (ref 31.4–37.4)
MCV RBC AUTO: 91 FL (ref 82–98)
MONOCYTES # BLD AUTO: 0.38 THOUSAND/ΜL (ref 0.17–1.22)
MONOCYTES NFR BLD AUTO: 9 % (ref 4–12)
NEUTROPHILS # BLD AUTO: 2.72 THOUSANDS/ΜL (ref 1.85–7.62)
NEUTS SEG NFR BLD AUTO: 59 % (ref 43–75)
NRBC BLD AUTO-RTO: 0 /100 WBCS
PHOSPHATE SERPL-MCNC: 3.7 MG/DL (ref 2.3–4.1)
PLATELET # BLD AUTO: 282 THOUSANDS/UL (ref 149–390)
PMV BLD AUTO: 10.1 FL (ref 8.9–12.7)
POTASSIUM SERPL-SCNC: 3.2 MMOL/L (ref 3.5–5.3)
PROCALCITONIN SERPL-MCNC: 0.07 NG/ML
RBC # BLD AUTO: 3.44 MILLION/UL (ref 3.81–5.12)
SODIUM SERPL-SCNC: 144 MMOL/L (ref 136–145)
WBC # BLD AUTO: 4.48 THOUSAND/UL (ref 4.31–10.16)

## 2020-07-17 PROCEDURE — 80048 BASIC METABOLIC PNL TOTAL CA: CPT | Performed by: INTERNAL MEDICINE

## 2020-07-17 PROCEDURE — 77001 FLUOROGUIDE FOR VEIN DEVICE: CPT

## 2020-07-17 PROCEDURE — 85025 COMPLETE CBC W/AUTO DIFF WBC: CPT | Performed by: GENERAL PRACTICE

## 2020-07-17 PROCEDURE — 77001 FLUOROGUIDE FOR VEIN DEVICE: CPT | Performed by: RADIOLOGY

## 2020-07-17 PROCEDURE — 02H633Z INSERTION OF INFUSION DEVICE INTO RIGHT ATRIUM, PERCUTANEOUS APPROACH: ICD-10-PCS | Performed by: RADIOLOGY

## 2020-07-17 PROCEDURE — 99232 SBSQ HOSP IP/OBS MODERATE 35: CPT | Performed by: GENERAL PRACTICE

## 2020-07-17 PROCEDURE — 84145 PROCALCITONIN (PCT): CPT | Performed by: GENERAL PRACTICE

## 2020-07-17 PROCEDURE — 84100 ASSAY OF PHOSPHORUS: CPT | Performed by: GENERAL PRACTICE

## 2020-07-17 PROCEDURE — C1751 CATH, INF, PER/CENT/MIDLINE: HCPCS

## 2020-07-17 PROCEDURE — 75860 VEIN X-RAY NECK: CPT

## 2020-07-17 PROCEDURE — 99232 SBSQ HOSP IP/OBS MODERATE 35: CPT | Performed by: INTERNAL MEDICINE

## 2020-07-17 PROCEDURE — 76937 US GUIDE VASCULAR ACCESS: CPT | Performed by: RADIOLOGY

## 2020-07-17 PROCEDURE — C1894 INTRO/SHEATH, NON-LASER: HCPCS

## 2020-07-17 PROCEDURE — 36556 INSERT NON-TUNNEL CV CATH: CPT

## 2020-07-17 PROCEDURE — 76937 US GUIDE VASCULAR ACCESS: CPT

## 2020-07-17 PROCEDURE — 36556 INSERT NON-TUNNEL CV CATH: CPT | Performed by: RADIOLOGY

## 2020-07-17 PROCEDURE — 36556 INSERT NON-TUNNEL CV CATH: CPT | Performed by: PHYSICIAN ASSISTANT

## 2020-07-17 PROCEDURE — 99024 POSTOP FOLLOW-UP VISIT: CPT | Performed by: RADIOLOGY

## 2020-07-17 RX ORDER — LANOLIN ALCOHOL/MO/W.PET/CERES
3 CREAM (GRAM) TOPICAL
Status: DISCONTINUED | OUTPATIENT
Start: 2020-07-17 | End: 2020-07-18

## 2020-07-17 RX ORDER — POTASSIUM CHLORIDE 20 MEQ/1
20 TABLET, EXTENDED RELEASE ORAL ONCE
Status: COMPLETED | OUTPATIENT
Start: 2020-07-17 | End: 2020-07-17

## 2020-07-17 RX ADMIN — NIFEDIPINE 30 MG: 30 TABLET, FILM COATED, EXTENDED RELEASE ORAL at 08:31

## 2020-07-17 RX ADMIN — APIXABAN 2.5 MG: 2.5 TABLET, FILM COATED ORAL at 08:29

## 2020-07-17 RX ADMIN — METOPROLOL TARTRATE 25 MG: 25 TABLET, FILM COATED ORAL at 08:29

## 2020-07-17 RX ADMIN — Medication 250 MG: at 08:29

## 2020-07-17 RX ADMIN — LEVOTHYROXINE SODIUM 75 MCG: 75 TABLET ORAL at 06:42

## 2020-07-17 RX ADMIN — APIXABAN 2.5 MG: 2.5 TABLET, FILM COATED ORAL at 17:15

## 2020-07-17 RX ADMIN — SODIUM BICARBONATE 75 ML/HR: 84 INJECTION, SOLUTION INTRAVENOUS at 01:00

## 2020-07-17 RX ADMIN — MELATONIN 1000 UNITS: at 08:29

## 2020-07-17 RX ADMIN — METOPROLOL TARTRATE 25 MG: 25 TABLET, FILM COATED ORAL at 17:15

## 2020-07-17 RX ADMIN — CEFAZOLIN 500 MG: 1 INJECTION, POWDER, FOR SOLUTION INTRAMUSCULAR; INTRAVENOUS at 17:24

## 2020-07-17 RX ADMIN — DIBASIC SODIUM PHOSPHATE, MONOBASIC POTASSIUM PHOSPHATE AND MONOBASIC SODIUM PHOSPHATE 1 TABLET: 852; 155; 130 TABLET ORAL at 16:50

## 2020-07-17 RX ADMIN — Medication 125 MG: at 08:40

## 2020-07-17 RX ADMIN — IOHEXOL 5 ML: 350 INJECTION, SOLUTION INTRAVENOUS at 14:26

## 2020-07-17 RX ADMIN — MELATONIN 3 MG: at 23:56

## 2020-07-17 RX ADMIN — DIBASIC SODIUM PHOSPHATE, MONOBASIC POTASSIUM PHOSPHATE AND MONOBASIC SODIUM PHOSPHATE 1 TABLET: 852; 155; 130 TABLET ORAL at 08:29

## 2020-07-17 RX ADMIN — RUXOLITINIB 10 MG: 10 TABLET ORAL at 08:31

## 2020-07-17 RX ADMIN — Medication 125 MG: at 21:00

## 2020-07-17 RX ADMIN — POTASSIUM CHLORIDE 20 MEQ: 1500 TABLET, EXTENDED RELEASE ORAL at 18:11

## 2020-07-17 RX ADMIN — CITALOPRAM HYDROBROMIDE 20 MG: 20 TABLET ORAL at 08:39

## 2020-07-17 NOTE — ASSESSMENT & PLAN NOTE
Estimated Creatinine Clearance: 15 7 mL/min (A) (by C-G formula based on SCr of 3 17 mg/dL (H))    Cr appears to be at baseline low 3s  Sees Dr Flynn Rosas outpt  Renal appreciated  C/w bicarb gtt No

## 2020-07-17 NOTE — PLAN OF CARE
Problem: Potential for Falls  Goal: Patient will remain free of falls  Description  INTERVENTIONS:  - Assess patient frequently for physical needs  -  Identify cognitive and physical deficits and behaviors that affect risk of falls    -  [place] fall precautions as indicated by assessment   - Educate patient/family on patient safety including physical limitations  - Instruct patient to call for assistance with activity based on assessment  - Modify environment to reduce risk of injury  - Consider OT/PT consult to assist with strengthening/mobility  Outcome: Progressing

## 2020-07-17 NOTE — PROCEDURES
Central Line Insertion  Date/Time: 7/17/2020 5:45 PM  Performed by: Helen Irving MD  Authorized by: Helen Irving MD     Patient location:  IR  Other Assisting Provider: No    Consent:     Consent obtained:  Written    Consent given by:  Patient    Risks discussed:  Arterial puncture, bleeding and infection    Alternatives discussed:  No treatment and delayed treatment  Universal protocol:     Procedure explained and questions answered to patient or proxy's satisfaction: yes      Relevant documents present and verified: yes      Test results available and properly labeled: yes      Radiology Images displayed and confirmed  If images not available, report reviewed: yes      Required blood products, implants, devices, and special equipment available: yes      Site/side marked: yes      Immediately prior to procedure, a time out was called: yes      Patient identity confirmed:  Verbally with patient and arm band  Pre-procedure details:     Hand hygiene: Hand hygiene performed prior to insertion      Sterile barrier technique: All elements of maximal sterile technique followed      Skin preparation:  2% chlorhexidine    Skin preparation agent: Skin preparation agent completely dried prior to procedure    Procedure details:     Location: Right external jugular  Vessel type: vein      Laterality:  Left    Approach: percutaneous technique used      Patient position:  Flat    Catheter type:  Double lumen    Catheter size:  5 Fr    Landmarks identified: yes      Ultrasound guidance: yes      Sterile ultrasound techniques: Sterile gel and sterile probe covers were used      Number of attempts:  1    Successful placement: yes    Post-procedure details:     Post-procedure:  Dressing applied and line sutured    Assessment:  Placement verified by x-ray    Post-procedure complications: none      Patient tolerance of procedure:   Tolerated well, no immediate complications  Comments:      Right EJ nontunneled dialysis catheter OK to use

## 2020-07-17 NOTE — PHYSICAL THERAPY NOTE
Physical Therapy Cancellation Note  PT orders received, chart reviewed  Attempted to see patient for initial PT evaluation, however patient is currently off the floor at IR  Will re-attempt PT initiation as time permits        Live Hopkins PT, DPT

## 2020-07-17 NOTE — OCCUPATIONAL THERAPY NOTE
Occupational Therapy Cancellation Note  OT orders received, pt's chart reviewed  Attempted to see patient for initial OT evaluation, however pt off the floor at IR  OT to continue to follow and re-attempt to see patient for initial evaluation as time permits      NEVAEH Pacheco, OTR/L

## 2020-07-17 NOTE — SEDATION DOCUMENTATION
Right EJ PICC inserted by Dr Gruber Other  Patient tolerated well, denies pain and discomfort  Dressing CDI    Report called to JOHNATHAN Nguyen

## 2020-07-17 NOTE — PROGRESS NOTES
Progress Note - Desma Comfort [birth year], 77 y o  female MRN: [id number]    Unit/Bed#: [facility] 722-01 Encounter: [id number]    Primary Care Provider: Omer Chang MD   Date and time admitted to hospital: 7/15/2020  6:41 AM        * Toxic metabolic encephalopathy  Assessment & Plan  Likely 2/2 Valtrex toxicity in setting of CKD5  Stop Valtrex  Improving      Inverted T wave  Assessment & Plan  No CP and neg trop  No need for further w/u at this time    Herpes zoster  Assessment & Plan  In anal region  Pt took Valtrex 1 Gm x 2 8 hours apart  In setting of hallucinations stop Valtrex  Pt asymptomatic from this at this time so hold off on antivirals      UTI (urinary tract infection)  Assessment & Plan  Pt has ileal conduit  Stop Bactrim in setting of CKD5 - took dose 7/14  Rocephin de-escalated to Ancef  Vanco prophylaxis  Cx > 100,000 E coli pansensitive  Procal mildly elevated in pt with CKD now WNL    Stage 5 chronic kidney disease not on chronic dialysis ([facility] [address] )  Assessment & Plan  Estimated Creatinine Clearance: 15 7 mL/min (A) (by C-G formula based on SCr of 3 17 mg/dL (H))  Cr appears to be at baseline low 3s  Sees Dr Cooper Shore outpt  Renal appreciated  C/w bicarb gtt    Acidosis, metabolic  Assessment & Plan  2/2 CKD  Does not tolerate Bicarb tabs  Renal consult appreciated - started on bicarb gtt    Hypokalemia  Assessment & Plan  Will give Kdur 20 - careful repletion in setting of CKD5    Diarrhea  Assessment & Plan  POA  C diff neg  Start Vanco prophylaxis in pt w/ hx C diff   Immodium prn    Hypertension  Assessment & Plan  C/w BB  Procardia added as BP elevated    Chronic saddle pulmonary embolism (HCC)  Assessment & Plan  C/w Eliquis low dose (in setting of CKD5)    VTE Pharmacologic Prophylaxis:   Pharmacologic: Apixaban (Eliquis)  Mechanical VTE Prophylaxis in Place: Yes    Patient Centered Rounds: I have performed bedside rounds with nursing staff today      Discussions with Specialists or Other Care Team Provider: renal and IR    Education and Discussions with Family / Patient: pt and son    Time Spent for Care: 30 minutes  More than 50% of total time spent on counseling and coordination of care as described above  Current Length of Stay: 2 day(s)    Current Patient Status: Inpatient   Certification Statement: The patient will continue to require additional inpatient hospital stay due to need for bicarb gtt    Discharge Plan: when ok from renal standpoint - possibly  or Monday    Code Status: Level 1 - Full Code      Subjective:   Feeling better    Objective:     Vitals:   Temp (24hrs), Av 5 °F (36 9 °C), Min:98 4 °F (36 9 °C), Max:98 6 °F (37 °C)    Temp:  [98 4 °F (36 9 °C)-98 6 °F (37 °C)] 98 6 °F (37 °C)  HR:  [68-81] 71  Resp:  [17] 17  BP: (159-176)/(86-96) 165/93  SpO2:  [97 %-100 %] 99 %  Body mass index is 39 26 kg/m²  Input and Output Summary (last 24 hours): Intake/Output Summary (Last 24 hours) at 2020 1756  Last data filed at 2020 1726  Gross per 24 hour   Intake 1080 ml   Output 1325 ml   Net -245 ml       Physical Exam:     Physical Exam   Constitutional: She is oriented to person, place, and time  No distress  HENT:   Head: Normocephalic and atraumatic  Eyes: EOM are normal    Neck: Normal range of motion  Neck supple  Cardiovascular: Normal rate and regular rhythm  Pulmonary/Chest: Effort normal and breath sounds normal  She has no wheezes  She has no rales  Abdominal: Soft  Bowel sounds are normal  She exhibits no distension  There is no tenderness  Musculoskeletal: Normal range of motion  She exhibits no edema  Neurological: She is alert and oriented to person, place, and time  Skin: Skin is warm and dry  She is not diaphoretic         Additional Data:     Labs:    Results from last 7 days   Lab Units 20  1516   WBC Thousand/uL 4 48   HEMOGLOBIN g/dL 10 5*   HEMATOCRIT % 31 4*   PLATELETS Thousands/uL 282   NEUTROS PCT % 59   LYMPHS PCT % 29   MONOS PCT % 9   EOS PCT % 1     Results from last 7 days   Lab Units 07/17/20  1516  07/14/20  1129   POTASSIUM mmol/L 3 2*   < > 4 0   CHLORIDE mmol/L 111*   < > 109*   CO2 mmol/L 25   < > 16*   BUN mg/dL 29*   < > 38*   CREATININE mg/dL 3 17*   < > 3 45*   CALCIUM mg/dL 7 9*   < > 8 8   ALK PHOS U/L  --   --  72   ALT U/L  --   --  10*   AST U/L  --   --  16    < > = values in this interval not displayed  * I Have Reviewed All Lab Data Listed Above  * Additional Pertinent Lab Tests Reviewed:  [address] Admission Reviewed        Recent Cultures (last 7 days):     Results from last 7 days   Lab Units 07/16/20  1350 07/14/20  1139   URINE CULTURE   --  >100,000 cfu/ml Escherichia coli*  20,000-29,000 cfu/ml    C DIFF TOXIN B  Negative  --        Last 24 Hours Medication List:     Current Facility-Administered Medications:  acetaminophen 650 mg Oral Q6H PRN [facility], DO    apixaban 2 5 mg Oral BID [facility], DO    calcitriol 0 25 mcg Oral Every Other Day [facility]    cefazolin 500 mg Intravenous Q12H [facility], DO Last Rate: 500 mg (07/17/20 1724)   cholecalciferol 1,000 Units Oral Daily [facility], DO    citalopram 20 mg Oral Daily [facility], DO    levothyroxine 75 mcg Oral Early Morning [facility], DO    loperamide 2 mg Oral 4x Daily PRN [facility], DO    metoprolol tartrate 25 mg Oral BID [facility], DO    NIFEdipine 30 mg Oral Daily Nikolay Baker MD    potassium chloride 20 mEq Oral Once [facility], DO    potassium-sodium phosphateS 1 tablet Oral BID With Meals Cheikh Siegel MD    ruxolitinib 10 mg Oral Daily Bard Massed, DO    saccharomyces boulardii 250 mg Oral Daily [facility], DO    sodium bicarbonate infusion 75 mL/hr Intravenous Continuous Cheikh Siegel MD Last Rate: 75 mL/hr (07/17/20 0100)   vancomycin 125 mg Oral Q12H [address],          Today, Patient Was Seen By: Bard Bro DO    ** Please Note: Dictation voice to text software may have been used in the creation of this document   **

## 2020-07-17 NOTE — PROGRESS NOTES
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, history of nonfunctional bladder, prior history of bilateral hydronephrosis, status post cystectomy with ileal conduit in 2016, polycythemia vera, history of PE, presented with diarrhea and rash   We are consulted for CKD management      CKD stage 4 to 5, baseline creatinine high 2s to low 3s, follows with Dr Cammie Parson  -creatinine 3 2 overall stable close to baseline Yesterday  Pending BMP results today   -continue IV bicarb drip at 75 mL/hour  -BMP in a m   -continue to avoid Valtrex, Bactrim (she admits to be taking both on 7/14/20)  -CKD suspected to be secondary to obstructive uropathy, likely functional solitary right kidney  -CT scan of abdomen does not report hydronephrosis, shows atrophic left kidney      Low bicarb, presume hyperchloremic non-anion gap metabolic acidosis in the setting of ileal conduit, advanced renal failure  -serum bicarb now slowly improving to 18 yesterday  BMP results pending today   -bicarb drip as above  -patient was not able to tolerate sodium bicarb supplement due to leg swelling issues in the past     Anemia in CKD, hemoglobin overall stable   Continue to closely monitor   Transfuse p r n  For hemoglobin less than seven      Nonfunctional bladder history, status post cystectomy with ileal conduit in 2016  Follows with Urology as an outpatient      Secondary hyperparathyroidism, on calcitriol  Hypophosphatemia, serum phosphorus 2 0  Yesterday,  Started Neutra-Phos one packet p o  B i d   For total 3 days      Hypertension, BP  Remains above goal  -currently on metoprolol 25 mg b i d ,  Started patient on nifedipine XL 30 mg daily with holding parameter  - if blood pressure remains greater than 150/90 persistently, consider changing metoprolol to carvedilol  25 mg b i d      Suspected UTI, UA which is ileal conduit specimen shows 3+ proteinuria, concentrated sample, innumerable RBCs/WBCs   -urine culture shows GNR  -currently on antibiotic as per primary team     Altered mental status, seems to have improved at the time of my encounter today  management as per primary team  -?  Metabolic encephalopathy in the setting of suspected UTI, use of Valtrex     Herpes zoster, management as per primary team   Diarrhea, C diff  negative     Discussed above plan in detail with primary team     SUBJECTIVE:  Patient seen and examined at bedside   No chest pain, shortness of breath, nausea, vomiting, abdominal pain    OBJECTIVE:  Current Weight: Weight - Scale: 91 2 kg (201 lb)  Vitals:    07/17/20 1402   BP:    Pulse: 71   Resp:    Temp:    SpO2: 99%       Intake/Output Summary (Last 24 hours) at 7/17/2020 1620  Last data filed at 7/17/2020 1100  Gross per 24 hour   Intake 860 ml   Output 1125 ml   Net -265 ml     Wt Readings from Last 3 Encounters:   07/15/20 91 2 kg (201 lb)   06/24/20 91 2 kg (201 lb)   01/13/20 89 8 kg (198 lb)     Temp Readings from Last 3 Encounters:   07/17/20 98 6 °F (37 °C)   07/14/20 97 7 °F (36 5 °C) (Oral)   06/24/20 98 2 °F (36 8 °C) (Oral)     BP Readings from Last 3 Encounters:   07/17/20 (!) 176/96   07/14/20 (!) 179/92   06/24/20 126/82     Pulse Readings from Last 3 Encounters:   07/17/20 71   07/14/20 58   06/24/20 72        Physical Examination:  General:  Sitting in chair, no acute distress  Eyes:  Mild conjunctival pallor present  ENT:  External examination of ears and nose unremarkable  Neck:  No obvious lymphadenopathy appreciated  Respiratory:   Bilateral air entry present  CVS:  S1, S2 present  GI:  Soft, nontender, nondistended  CNS:  Active alert oriented x3  Musculoskeletal:  No obvious gross deformity noted    Medications:    Current Facility-Administered Medications:     acetaminophen (TYLENOL) tablet 650 mg, 650 mg, Oral, Q6H PRN, Rendell Gregory, DO    apixaban [facility]) tablet 2 5 mg, 2 5 mg, Oral, BID, Rendell Gregory, DO, 2 5 mg at 07/17/20 3488    calcitriol (ROCALTROL) capsule 0 25 mcg, 0 25 mcg, Oral, Every Other Day, Rendell Gregory, DO, 0 25 mcg at 07/16/20 0801    ceFAZolin (ANCEF) 500 mg in sodium chloride 0 9 % 50 mL IVPB, 500 mg, Intravenous, Q12H, Rendell Gregory, DO    cholecalciferol (VITAMIN D3) tablet 1,000 Units, 1,000 Units, Oral, Daily, Rendell Gregory, DO, 1,000 Units at 07/17/20 0829    citalopram (CeleXA) tablet 20 mg, 20 mg, Oral, Daily, Rendell Gregory, DO, 20 mg at 07/17/20 2142    levothyroxine tablet 75 mcg, 75 mcg, Oral, Early Morning, Rendell Gregory, DO, 75 mcg at 07/17/20 5486    loperamide (IMODIUM) capsule 2 mg, 2 mg, Oral, 4x Daily PRN, Rendell Gregory, DO    metoprolol tartrate (LOPRESSOR) tablet 25 mg, 25 mg, Oral, BID, Rendell Gregory, DO, 25 mg at 07/17/20 0829    NIFEdipine (PROCARDIA XL) 24 hr tablet 30 mg, 30 mg, Oral, Daily, Roberto Grant MD, 30 mg at 07/17/20 0831    potassium-sodium phosphateS (K-PHOS,PHOSPHA 250) -250 mg tablet 1 tablet, 1 tablet, Oral, BID With Meals, Roberto Grant MD, 1 tablet at 07/17/20 0829    ruxolitinib (JAKAFI) tablet 10 mg, 10 mg, Oral, Daily, Rendell Gregory, DO, 10 mg at 07/17/20 0831    saccharomyces boulardii (FLORASTOR) capsule 250 mg, 250 mg, Oral, Daily, Rendell Gregory, DO, 250 mg at 07/17/20 0829    sodium bicarbonate 150 mEq in dextrose 5 % 1,000 mL infusion, 75 mL/hr, Intravenous, Continuous, Nikolay Baker MD, Last Rate: 75 mL/hr at 07/17/20 0100, 75 mL/hr at 07/17/20 0100    vancomycin (VANCOCIN) oral solution 125 mg, 125 mg, Oral, Q12H [facility] DO Gregory, 125 mg at 07/17/20 0840    Laboratory Results:  Results from last 7 days   Lab Units 07/16/20  0519 07/15/20  0716 07/14/20  1333 07/14/20  1129   WBC Thousand/uL 3 34* 3 91*  --  4 07*   HEMOGLOBIN g/dL 10 3* 10 5*  --  12 1   HEMATOCRIT % 31 0* 31 1*  --  36 6   PLATELETS Thousands/uL 199 205  --  249   SODIUM mmol/L 136 137 137 135*   POTASSIUM mmol/L 4 0 4 3 4 3 4 0   CHLORIDE mmol/L 109* 113* 113* 109*   CO2 mmol/L 18* 16* 17* 16*   BUN mg/dL 32* 40* 37* 38*   CREATININE mg/dL 3 26* 3 37* 3 11* 3 45*   CALCIUM mg/dL 8 0* 8 3 8 5 8 8   MAGNESIUM mg/dL 2 0  --   --   --    PHOSPHORUS mg/dL 2 0*  --   --   --        IR central line placement   Final Result by El Holman MD (07/17 1436)   Impression:   Ultrasound and fluoroscopically guided placement of a 5-L[place]anese double-lumen power injectable central venous catheter via the right external jugular vein      Catheter is ready for immediate use  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

## 2020-07-17 NOTE — ASSESSMENT & PLAN NOTE
Pt has ileal conduit  Stop Bactrim in setting of CKD5 - took dose 7/14  Rocephin de-escalated to Ancef  Vanco prophylaxis  Cx > 100,000 E coli pansensitive  Procal mildly elevated in pt with CKD now WNL

## 2020-07-17 NOTE — ED PROVIDER NOTES
Final Diagnosis:  1  Dizziness    2  Stage 4 chronic kidney disease ([facility] )    3  Meningioma ([facility] )    4  Shingles    5  T wave inversion in EKG    6  Hallucinations    7  Metabolic acidosis      ED Course as of Jul 17 1113   Wed Jul 15, 2020   0715 Procedure Note: EKG  Date/Time: 07/15/20 715 AM   Interpreted by: Perfecto Ascencio  Indications / Diagnosis: Dizziness  ECG reviewed by me, the ED Provider: yes   The EKG demonstrates:  Rhythm: normal sinus  Intervals: normal intervals  Axis: normal axis  QRS/Blocks: normal QRS  ST Changes: New T wave inversions in V2/V3 No acute ST Changes, no STD/MARLON           0730 Hemoglobin(!): 10 5   0751 Likely from RTA   CO2(!): 16   0850 Patient was re-evaluated and their vitals were reassessed  Patient and/or family updated on the results of any testing possibly including but not limited to labs, EKG, urinalysis, and/or imaging  They were asked about pain, nausea, or any other complaints  Patient still complaining of dizziness and feels like the walls are caving in  No additional complaints at this time  Negative CT  Will bring patient in for further evaluation            Chief Complaint   Patient presents with    Dizziness     pt states that she was here yesterday for shingles and took "new medications" now pt states when she stands up she gets confused and dizzy       ASSESSMENT + PLAN:   - Nursing note reviewed  1  Dizziness/Hallucinations  -Basic labs, CT head   -Symptomatic control  -CT head negative and symptoms uncontrolled  -Likely needs MRI vs  re-evaluation of medications  -Will admit     Procedure Note: EKG  Date/Time:07/15/2020 0701  Interpreted by: Perfecto Ascencio  Indications / Diagnosis: Dizziness  ECG reviewed by me, the ED Provider: yes   The EKG demonstrates:  Rhythm: normal sinus  Intervals: normal intervals  Axis: normal axis  QRS/Blocks: normal QRS  ST Changes: T wave inversion V2/V3; No acute ST Changes, no STD/MARLON             Final Dispo   Patient was reassessed  Patient was updated with information regarding the tests/imaging done today  I answered all of the patient's and/or family's questions  Patient and/or family vocalizes understanding  After discussion and given the test results, the patient will be admitted to the hospital  Patient and/or family are agreeable to the plan   They will be admitted to [facility] who will further manage their care in the hospital       Medications   acetaminophen (TYLENOL) tablet 650 mg (has no administration in time range)   apixaban (ELIQUIS) tablet 2 5 mg (2 5 mg Oral Given 7/17/20 0829)   calcitriol (ROCALTROL) capsule 0 25 mcg (0 25 mcg Oral Given 7/16/20 0801)   cholecalciferol (VITAMIN D3) tablet 1,000 Units (1,000 Units Oral Given 7/17/20 0829)   citalopram (CeleXA) tablet 20 mg (20 mg Oral Given 7/17/20 0839)   levothyroxine tablet 75 mcg (75 mcg Oral Given 7/17/20 0642)   loperamide (IMODIUM) capsule 2 mg (has no administration in time range)   metoprolol tartrate (LOPRESSOR) tablet 25 mg (25 mg Oral Given 7/17/20 0829)   saccharomyces boulardii (FLORASTOR) capsule 250 mg (250 mg Oral Given 7/17/20 0829)   vancomycin (VANCOCIN) oral solution 125 mg (125 mg Oral Given 7/17/20 0840)   sodium bicarbonate 150 mEq in dextrose 5 % 1,000 mL infusion (75 mL/hr Intravenous New Bag 7/17/20 0100)   ruxolitinib (JAKAFI) tablet 10 mg (10 mg Oral Given 7/17/20 0831)   NIFEdipine (PROCARDIA XL) 24 hr tablet 30 mg (30 mg Oral Given 7/17/20 0831)   potassium-sodium phosphateS (K-PHOS,PHOSPHA 250) -250 mg tablet 1 tablet (1 tablet Oral Given 7/17/20 0829)   ceFAZolin (ANCEF) 500 mg in sodium chloride 0 9 % 50 mL IVPB (has no administration in time range)   multi-electrolyte (ISOLYTE-S PH 7 4) bolus 500 mL (0 mL Intravenous Stopped 7/15/20 0904)   diazepam (VALIUM) injection 2 5 mg (2 5 mg Intravenous Given 7/15/20 0752)   meclizine (ANTIVERT) tablet 12 5 mg (12 5 mg Oral Given 7/15/20 0905)     Time reflects when diagnosis was documented in both MDM as applicable and the Disposition within this note     Time User Action Codes Description Comment    7/15/2020  9:25 AM Olesya Cords Add [R42] Dizziness     7/15/2020  9:25 AM Olesya Cords Add [N18 4] Stage 4 chronic kidney disease ([facility] )     7/15/2020  9:25 AM Olesya Cords Add [D32 9] Meningioma ([facility] )     7/15/2020  9:26 AM Olesya Victor Add [B02 9] Shingles     7/15/2020  9:26 AM Doug Sepulveda Add [R94 31] T wave inversion in EKG     7/15/2020  9:26 AM Doug Sepulveda Add [F19 951] Hallucination, drug-induced ([place] [address] )     7/15/2020  9:27 AM Jcarlosariane Floresen [W73 660] Hallucination, drug-induced ([place] [address] )     7/15/2020  9:27 AM Olesya Victor Add [R44 3] Hallucinations     7/15/2020  2:22 PM Sudallinbartolome Jayant Add [O02 3] Metabolic acidosis       ED Disposition     ED Disposition Condition Date/Time Comment    Admit Stable Wed Jul 15, 2020  9:25 AM Case was discussed with GERRI and the patient's admission status was agreed to be Admission Status: inpatient status to the service of Dr Vandana Ordonez  Follow-up Information    None       Current Discharge Medication List      CONTINUE these medications which have NOT CHANGED    Details   amLODIPine (NORVASC) 10 mg tablet Take 10 mg by mouth daily      apixaban (ELIQUIS) 2 5 mg Take 1 tablet (2 5 mg total) by mouth 2 (two) times a day  Qty: 60 tablet, Refills: 5    Associated Diagnoses: Polycythemia vera ([place] [address] ); 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)      calcitriol (ROCALTROL) 0 25 mcg capsule TAKE 1 CAPSULE BY MOUTH EVERY OTHER DAY  Qty: 15 capsule, Refills: 5    Associated Diagnoses: Secondary hyperparathyroidism of renal origin ([facility] )      Cholecalciferol (VITAMIN D3) 1000 UNITS CAPS Take 1,000 unit marking on U-100 syringe by mouth daily        citalopram (CeleXA) 20 mg tablet Take 20 mg by mouth daily       JAKAFI 10 MG tablet Take 1 tablet (10 mg total) by mouth daily  Qty: 30 tablet, Refills: 5    Associated Diagnoses: Polycythemia vera (HCC)      levothyroxine 75 mcg tablet Take 75 mcg by mouth daily  Refills: 3      metoprolol tartrate (LOPRESSOR) 25 mg tablet TAKE 1 TABLET BY MOUTH TWICE A DAY  Qty: 60 tablet, Refills: 5    Comments: DX Code Needed    Associated Diagnoses: CKD (chronic kidney disease), stage IV ([facility])      saccharomyces boulardii (FLORASTOR) 250 mg capsule Take 1 capsule by mouth daily        sulfamethoxazole-trimethoprim (BACTRIM DS) 800-160 mg per tablet Take 1 tablet by mouth 2 (two) times a day for 5 days smx-tmp DS (BACTRIM) 800-160 mg tabs (1tab q12 D10)  Qty: 10 tablet, Refills: 0    Associated Diagnoses: UTI (urinary tract infection)      valACYclovir (VALTREX) 1,000 mg tablet Take 1 tablet (1,000 mg total) by mouth 3 (three) times a day for 7 days  Qty: 21 tablet, Refills: 0    Associated Diagnoses: Shingles rash      WELCHOL 625 MG tablet as needed   Refills: 2      acetaminophen (TYLENOL) 325 mg tablet 650 mg every 6 hours as needed for mild pain or headache  Qty: 30 tablet, Refills: 0    Associated Diagnoses: Subdural hematoma, acute ([facility])      loperamide (IMODIUM) 2 mg capsule Take 1 capsule (2 mg total) by mouth 4 (four) times a day as needed for diarrhea  Qty: 12 capsule, Refills: 0    Associated Diagnoses: Diarrhea      sodium bicarbonate 650 mg tablet Take 650 mg by mouth 2 (two) times a day           No discharge procedures on file  Prior to Admission Medications   Prescriptions Last Dose Informant Patient Reported? Taking? Cholecalciferol (VITAMIN D3) 1000 UNITS CAPS 2020 at Unknown time Self Yes Yes   Sig: Take 1,000 unit marking on U-100 syringe by mouth daily     JAKAFI 10 MG tablet 2020 at Unknown time  No Yes   Sig: Take 1 tablet (10 mg total) by mouth daily   WELCHOL 625 MG tablet 2020 at Unknown time Self Yes Yes   Sig: as needed    acetaminophen (TYLENOL) 325 mg tablet Unknown at Unknown time Self No No   Si mg every 6 hours as needed for mild pain or headache Patient taking differently: as needed 650 mg every 6 hours as needed for mild pain or headache   amLODIPine (NORVASC) 10 mg tablet 7/14/2020 at Unknown time  Yes Yes   Sig: Take 10 mg by mouth daily   apixaban (ELIQUIS) 2 5 mg 7/14/2020 at Unknown time Self No Yes   Sig: Take 1 tablet (2 5 mg total) by mouth 2 (two) times a day   calcitriol (ROCALTROL) 0 25 mcg capsule 7/14/2020 at Unknown time  No Yes   Sig: TAKE 1 CAPSULE BY MOUTH EVERY OTHER DAY  citalopram (CeleXA) 20 mg tablet 7/14/2020 at Unknown time Self Yes Yes   Sig: Take 20 mg by mouth daily    levothyroxine 75 mcg tablet 7/14/2020 at Unknown time Self Yes Yes   Sig: Take 75 mcg by mouth daily   loperamide (IMODIUM) 2 mg capsule Unknown at Unknown time  No No   Sig: Take 1 capsule (2 mg total) by mouth 4 (four) times a day as needed for diarrhea   metoprolol tartrate (LOPRESSOR) 25 mg tablet 7/14/2020 at Unknown time  No Yes   Sig: TAKE 1 TABLET BY MOUTH TWICE A DAY   Patient taking differently: 12 5 mg 2 (two) times a day Hold for heart rate less than 50 beats per minute  saccharomyces boulardii (FLORASTOR) 250 mg capsule 7/14/2020 at Unknown time Self Yes Yes   Sig: Take 1 capsule by mouth daily     sodium bicarbonate 650 mg tablet Not Taking at Unknown time  Yes No   Sig: Take 650 mg by mouth 2 (two) times a day   sulfamethoxazole-trimethoprim (BACTRIM DS) 800-160 mg per tablet 7/14/2020 at Unknown time  No Yes   Sig: Take 1 tablet by mouth 2 (two) times a day for 5 days smx-tmp DS (BACTRIM) 800-160 mg tabs (1tab q12 D10)   valACYclovir (VALTREX) 1,000 mg tablet 7/14/2020 at Unknown time  No Yes   Sig: Take 1 tablet (1,000 mg total) by mouth 3 (three) times a day for 7 days      Facility-Administered Medications: None       History of Present Illness: This is a 77 y o  female presenting today for evaluation of dizziness   Patient said she was evaluated in the ED yesterday and given two medications (acyclovir and bactrim) and believes one of them is responsible for her dizziness  She said she was having increased output and possible dysuria and a new rash which is why she came in  She was diagnosed with Shingles and a UTI and d/stacey  Now having hallucinations and feeling like the walls are melting and her head is spinning     - No language barrier    - History obtained from patient and chart  - There are no limitations to the history obtained  - Previous charting was reviewed  Some data reviewed included below for ease of access whether or not it is relevant to this patient encounter  Past Medical, Past Surgical History:    has a past medical history of Anxiety, Chronic kidney disease (CKD), stage IV (severe) ([facility] ), Chronic thrombosis of subclavian vein ([facility] ), Compression fracture of cervical spine ([facility] ), Hydronephrosis, Hypertension, Hypothyroid, Incontinence, Lung mass, Polycythemia vera ([facility] ), Pulmonary embolism ([facility] ) (2014), and Urinary tract infection  has a past surgical history that includes Cholecystectomy (N/A); Bladder suspension; Tonsillectomy; [place] tooth extraction; Colonoscopy; Tubal ligation; CYSTECTOMY, RADICAL WITH ILEOCONDUIT (N/A, 10/4/2016); pr colonoscopy flx dx w/collj spec when pfrmd (N/A, 8/31/2016); pr cystoscopy,insert ureteral stent (Bilateral, 7/27/2016); Cystoscopy (Bilateral, 7/27/2016); and BOTOX INJECTION (N/A, 7/27/2016)  Allergies: Allergies   Allergen Reactions    Chlorhexidine Rash     petichi like rash when using chlorhexidine swabs prior to IV          Social and Family History:     Social History     Substance and Sexual Activity   Alcohol Use Not Currently    Frequency: Never    Binge frequency: Never    Comment: n/s     Social History     Tobacco Use   Smoking Status Never Smoker   Smokeless Tobacco Never Used   Tobacco Comment    n/a     Social History     Substance and Sexual Activity   Drug Use Not Currently    Comment: n/a       Review of Systems:   Review of Systems   Constitutional: Negative for chills, diaphoresis and fever  HENT: Negative  Eyes: Negative  Negative for visual disturbance  Respiratory: Negative  Negative for shortness of breath  Cardiovascular: Negative  Negative for chest pain  Gastrointestinal: Negative  Negative for abdominal pain, nausea and vomiting  Endocrine: Negative  Genitourinary: Positive for decreased urine volume and frequency  Musculoskeletal: Negative  Negative for myalgias  Skin: Positive for rash  Allergic/Immunologic: Negative  Neurological: Positive for dizziness and light-headedness  Negative for numbness  Hematological: Negative  Psychiatric/Behavioral: Positive for hallucinations  All other systems reviewed and are negative  Physical Examination     Vitals:    07/16/20 2103 07/16/20 2104 07/16/20 2106 07/17/20 0747   BP: 159/86 159/86 159/86 (!) 176/96   BP Location:   Right arm    Pulse:   81 76   Resp:    17   Temp:  98 4 °F (36 9 °C) 98 4 °F (36 9 °C) 98 6 °F (37 °C)   TempSrc:   Oral    SpO2:   97% 100%   Weight:       Height:         Vitals reviewed by me  Physical Exam   Constitutional: She is oriented to person, place, and time  She appears well-developed and well-nourished  HENT:   Head: Normocephalic and atraumatic  Mouth/Throat: Oropharynx is clear and moist    Eyes: Pupils are equal, round, and reactive to light  Conjunctivae and EOM are normal    Neck: Normal range of motion  Neck supple  Cardiovascular: Normal rate and regular rhythm  Pulmonary/Chest: Effort normal and breath sounds normal    Abdominal: Soft  She exhibits no distension  There is no tenderness  Ostomy pink and intact   Musculoskeletal: Normal range of motion  Neurological: She is alert and oriented to person, place, and time  She displays normal reflexes  No cranial nerve deficit or sensory deficit  She exhibits normal muscle tone  Coordination normal    Unable to get out of bed to assess gait   Skin: Rash noted     Shingles rash on R abdomen through R back   Psychiatric:   +Hallucinations; feels like walls are melting in; changing colors   Nursing note and vitals reviewed  ED Course as of Jul 17 1113   Wed Jul 15, 2020   0715 Procedure Note: EKG  Date/Time: 07/15/20 715 AM   Interpreted by: Sonal Chavarria  Indications / Diagnosis: Dizziness  ECG reviewed by me, the ED Provider: yes   The EKG demonstrates:  Rhythm: normal sinus  Intervals: normal intervals  Axis: normal axis  QRS/Blocks: normal QRS  ST Changes: New T wave inversions in V2/V3 No acute ST Changes, no STD/MARLON           0730 Hemoglobin(!): 10 5   0751 Likely from RTA   CO2(!): 16   0850 Patient was re-evaluated and their vitals were reassessed  Patient and/or family updated on the results of any testing possibly including but not limited to labs, EKG, urinalysis, and/or imaging  They were asked about pain, nausea, or any other complaints  Patient still complaining of dizziness and feels like the walls are caving in  No additional complaints at this time  Negative CT  Will bring patient in for further evaluation            CT head without contrast   Final Result   No acute intracranial abnormality  Stable presumed intraventricular meningioma  Workstation performed: [id number]         IR consult    (Results Pending)     Orders Placed This Encounter   Procedures    Clostridium difficile toxin by PCR with EIA    CT head without contrast    IR consult    Basic metabolic panel    CBC and differential    Troponin I    Procalcitonin    TSH, 3rd generation with Free T4 reflex    CBC    Basic metabolic panel    Magnesium    Phosphorus    Chloride, urine, random    Sodium, urine, random    Creatinine, urine, random    T4, free    Procalcitonin Reflex    Basic metabolic panel    CBC and differential    Phosphorus    CBC    Basic metabolic panel    Procalcitonin    Diet Renal; Renal [place]; No; No    Nursing communication Continue IV as ordered      Notify admitting physician    Notify admitting physician on arrival    Vital Signs per unit routine    Up and OOB as tolerated    Apply SCD or Foot pumps    Level 1-Full Code: all life saving measures are indicated    Inpatient consult to Nephrology    Droplet isolation status    OT eval and treat    PT eval and treat    EKG RESULTS    ECG 12 lead    ECG 12 lead    ECG 12 lead    ECG 12 lead    Inpatient Admission     Labs:     Labs Reviewed   BASIC METABOLIC PANEL - Abnormal       Result Value Ref Range Status    Sodium 137  136 - 145 mmol/L Final    Potassium 4 3  3 5 - 5 3 mmol/L Final    Comment: Slightly Hemolyzed; Results May be Affected    Chloride 113 (*) 100 - 108 mmol/L Final    CO2 16 (*) 21 - 32 mmol/L Final    ANION GAP 8  4 - 13 mmol/L Final    BUN 40 (*) 5 - 25 mg/dL Final    Creatinine 3 37 (*) 0 60 - 1 30 mg/dL Final    Comment: Standardized to IDMS reference method    Glucose 101  65 - 140 mg/dL Final    Comment:   If the patient is fasting, the ADA then defines impaired fasting glucose as > 100 mg/dL and diabetes as > or equal to 123 mg/dL  Specimen collection should occur prior to Sulfasalazine administration due to the potential for falsely depressed results  Specimen collection should occur prior to Sulfapyridine administration due to the potential for falsely elevated results      Calcium 8 3  8 3 - 10 1 mg/dL Final    eGFR 13  ml/min/1 73sq m Final    Narrative:     [place] guidelines for Chronic Kidney Disease (CKD):     Stage 1 with normal or high GFR (GFR > 90 mL/min/1 73 square meters)    Stage 2 Mild CKD (GFR = 60-89 mL/min/1 73 square meters)    Stage 3A Moderate CKD (GFR = 45-59 mL/min/1 73 square meters)    Stage 3B Moderate CKD (GFR = 30-44 mL/min/1 73 square meters)    Stage 4 Severe CKD (GFR = 15-29 mL/min/1 73 square meters)    Stage 5 End Stage CKD (GFR <15 mL/min/1 73 square meters)  Note: GFR calculation is accurate only with a steady state creatinine   CBC AND DIFFERENTIAL - Abnormal    WBC 3 91 (*) 4 31 - 10 16 Thousand/uL Final    RBC 3 41 (*) 3 81 - 5 12 Million/uL Final    Hemoglobin 10 5 (*) 11 5 - 15 4 g/dL Final    Hematocrit 31 1 (*) 34 8 - 46 1 % Final    MCV 91  82 - 98 fL Final    MCH 30 8  26 8 - 34 3 pg Final    MCHC 33 8  31 4 - 37 4 g/dL Final    RDW 14 5  11 6 - 15 1 % Final    MPV 9 9  8 9 - 12 7 fL Final    Platelets 335  441 - 390 Thousands/uL Final    nRBC 0  /100 WBCs Final    Neutrophils Relative 71  43 - 75 % Final    Immat GRANS % 2  0 - 2 % Final    Lymphocytes Relative 15  14 - 44 % Final    Monocytes Relative 10  4 - 12 % Final    Eosinophils Relative 1  0 - 6 % Final    Basophils Relative 1  0 - 1 % Final    Neutrophils Absolute 2 80  1 85 - 7 62 Thousands/µL Final    Immature Grans Absolute 0 06  0 00 - 0 20 Thousand/uL Final    Lymphocytes Absolute 0 59 (*) 0 60 - 4 47 Thousands/µL Final    Monocytes Absolute 0 38  0 17 - 1 22 Thousand/µL Final    Eosinophils Absolute 0 05  0 00 - 0 61 Thousand/µL Final    Basophils Absolute 0 03  0 00 - 0 10 Thousands/µL Final   TROPONIN I - Normal    Troponin I <0 02  <=0 04 ng/mL Final    Comment:   [employer] Chemistry analyzer 99% cutoff is > 0 04 ng/mL in network labs     o cTnI 99% cutoff is useful only when applied to patients in the clinical setting of myocardial ischemia   o cTnI 99% cutoff should be interpreted in the context of clinical history, ECG findings and possibly cardiac imaging to establish correct diagnosis  o cTnI 99% cutoff may be suggestive but clearly not indicative of a coronary event without the clinical setting of myocardial ischemia       TROPONIN I - Normal    Troponin I <0 02  <=0 04 ng/mL Final    Comment:   [employer] Chemistry analyzer 99% cutoff is > 0 04 ng/mL in network labs     o cTnI 99% cutoff is useful only when applied to patients in the clinical setting of myocardial ischemia   o cTnI 99% cutoff should be interpreted in the context of clinical history, ECG findings and possibly cardiac imaging to establish correct diagnosis  o cTnI 99% cutoff may be suggestive but clearly not indicative of a coronary event without the clinical setting of myocardial ischemia  Imaging:   Ct Abdomen Pelvis Wo Contrast    Result Date: 7/14/2020  Narrative: CT ABDOMEN AND PELVIS WITHOUT IV CONTRAST INDICATION:   hisotry of ostomy, diarrhea, some blood present  COMPARISON:  January 11, 2019 CT from January 11, 2019 TECHNIQUE:  CT examination of the abdomen and pelvis was performed without intravenous contrast   Axial, sagittal, and coronal 2D reformatted images were created from the source data and submitted for interpretation  Radiation dose length product (DLP) for this visit:  830 68 mGy-cm   This examination, like all CT scans performed in the [facility], was performed utilizing techniques to minimize radiation dose exposure, including the use of iterative  reconstruction and automated exposure control  Enteric contrast was administered  FINDINGS: ABDOMEN LOWER CHEST:  No clinically significant abnormality identified in the visualized lower chest  LIVER/BILIARY TREE:  Unremarkable  GALLBLADDER:  Gallbladder surgically absent SPLEEN:  Spleen measures 11 6 cm PANCREAS:  Unremarkable  ADRENAL GLANDS:  Unremarkable  KIDNEYS/URETERS:  The left kidney is atrophic  Multiple calculi are noted within the right kidney which are nonobstructing  A calculus seen in the lower pole of the right kidney measuring about 7 mm an additional 4 to 5 mm calculus seen in the lower pole STOMACH AND BOWEL:  No abnormal dilation of the bowel loops 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  There is adjacent to a parastomal hernia APPENDIX:  No findings to suggest appendicitis  ABDOMINOPELVIC CAVITY:  No ascites  No pneumoperitoneum  No lymphadenopathy    Again noted is a rounded cystic area in the right iliac region which measures about 7 6 cm x 4 5 cm  This is unchanged VESSELS:  Unremarkable for patient's age  PELVIS REPRODUCTIVE ORGANS:  Unremarkable for patient's age  URINARY BLADDER:  Unremarkable  ABDOMINAL WALL/INGUINAL REGIONS:  Compression deformity of the T12 vertebra seen with mild retropulsion of posterior vertebral margin  This is not seen on the previous study of January 11, 2019 4 has been described on the radiograph of August 6, 2019 OSSEOUS STRUCTURES:  Degenerative changes seen in the lumbar spine at L5-S1 There is facet joint disease at L5-S1 the pars defect in the L5     Impression: No acute infiltrates descending There is no bowel obstruction 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 Again noted is a cystic mass in the right iliac fossa which may postoperative or may be of for adnexal region, stable, can be related with ultrasound Right renal calculus Atrophic left kidney  I personally discussed this study with [address] [place] [address] on 7/14/2020 at 12:57 PM  Workstation performed: [id number]     Ct Head Without Contrast    Result Date: 7/15/2020  Narrative: CT BRAIN - WITHOUT CONTRAST INDICATION:   Dizziness, non-specific  COMPARISON:  May 23, 2019  August 27, 2019 TECHNIQUE:  CT examination of the brain was performed  In addition to axial images, coronal 2D reformatted images were created and submitted for interpretation  Radiation dose length product (DLP) for this visit:  810 52 mGy-cm   This examination, like all CT scans performed in the [facility], was performed utilizing techniques to minimize radiation dose exposure, including the use of iterative  reconstruction and automated exposure control  IMAGE QUALITY:  Diagnostic  FINDINGS: PARENCHYMA: Mild age-related volume loss  Decreased attenuation is noted in periventricular and subcortical white matter demonstrating an appearance that is statistically most likely to represent mild-moderate microangiopathic change   No CT signs of acute infarction  Stable partially calcified meningioma at the periphery of the atrium of right lateral ventricle measuring up to 12 mm, previously 12 mm  No mass effect or midline shift  No acute parenchymal hemorrhage  Bilateral physiologic basal ganglion calcifications  Arterial atherosclerosis  VENTRICLES AND EXTRA-AXIAL SPACES:  Normal for the patient's age  VISUALIZED ORBITS AND PARANASAL SINUSES:  Unremarkable  CALVARIUM AND EXTRACRANIAL SOFT TISSUES:  Normal      Impression: No acute intracranial abnormality  Stable presumed intraventricular meningioma  Workstation performed: [id number]        Final Diagnosis:  1  Dizziness    2  Stage 4 chronic kidney disease ([facility] [address] )    3  Meningioma ([facility] [address] )    4  Shingles    5  T wave inversion in EKG    6  Hallucinations    7  Metabolic acidosis        Historical Data to Review/Helpful for ICU/Consultants  **May or may not be pertinent to this hospitalization/ED Visit  Included on all my notes only to make reviewing patient's history/records easier when relevant  This is only a small portion of what I review on a patient's chart**    Vaccines  Immunization History   Administered Date(s) Administered    Influenza, Quadrivalent (nasal) 11/03/2016    Tdap 01/11/2019     @TAN@    Cardiac  TREADMILL STRESS  No results found for this or any previous visit    ----------------------------------------------------------------------------------------------  NUCLEAR STRESS TEST: No results found for this or any previous visit  No results found for this or any previous visit     --------------------------------------------------------------------------------  CATH:  No results found for this or any previous visit   --------------------------------------------------------------------------------  ECHO:   No results found for this or any previous visit    No results found for this or any previous visit   --------------------------------------------------------------------------------  HOLTER  No results found for this or any previous visit   --------------------------------------------------------------------------------  CAROTIDS  No results found for this or any previous visit  Weights:   IBW: 45 5 kg    Body mass index is 39 26 kg/m²  Weight (last 2 days)     Date/Time   Weight    07/15/20 0646   91 2 (201)            Height: 5' (152 4 cm)    Endocrine/TSH  Lab Results   Component Value Date    HGBA1C 5 1 05/20/2015        HIV  No results found for: CD4ABS  No results found for: [facility]    Labs:   Results from last 7 days   Lab Units 07/16/20  0519 07/15/20  0716 07/14/20  1129   WBC Thousand/uL 3 34* 3 91* 4 07*   HEMOGLOBIN g/dL 10 3* 10 5* 12 1   HEMATOCRIT % 31 0* 31 1* 36 6   PLATELETS Thousands/uL 199 205 249   NEUTROS PCT %  --  71 69   MONOS PCT %  --  10 12      Results from last 7 days   Lab Units 07/16/20  0519 07/15/20  0716 07/14/20  1333 07/14/20  1129   POTASSIUM mmol/L 4 0 4 3 4 3 4 0   CHLORIDE mmol/L 109* 113* 113* 109*   CO2 mmol/L 18* 16* 17* 16*   BUN mg/dL 32* 40* 37* 38*   CREATININE mg/dL 3 26* 3 37* 3 11* 3 45*   CALCIUM mg/dL 8 0* 8 3 8 5 8 8   ALK PHOS U/L  --   --   --  72   ALT U/L  --   --   --  10*   AST U/L  --   --   --  16     Results from last 7 days   Lab Units 07/16/20  0519   MAGNESIUM mg/dL 2 0     Results from last 7 days   Lab Units 07/16/20  0519   PHOSPHORUS mg/dL 2 0*              0   Lab Value Date/Time    TROPONINI <0 02 07/15/2020 1016    TROPONINI <0 02 07/15/2020 0716    TROPONINI <0 02 05/23/2019 1045    TROPONINI <0 02 01/11/2019 1242    TROPONINI <0 02 10/06/2018 2111       Micro:  Blood Culture:   Lab Results   Component Value Date    BLOODCX No Growth After 5 Days  05/23/2019    BLOODCX No Growth After 5 Days   05/23/2019     Urine Culture:   Lab Results   Component Value Date    URINECX >100,000 cfu/ml Escherichia coli (A) 07/14/2020    URINECX 20,000-29,000 cfu/ml  07/14/2020    URINECX >100,000 cfu/ml Klebsiella oxytoca (A) 05/23/2019    URINECX 80,000-89,000 cfu/ml Klebsiella pneumoniae (A) 05/23/2019    URINECX 0853-9069 cfu/ml  05/23/2019    URINECX >100,000 cfu/ml  01/11/2019    URINECX >100,000 cfu/ml Klebsiella oxytoca 07/25/2016     Sputum Culture: No components found for: SPUTUMCX  Wound Culure: No results found for: WOUNDCULT  CSF Culure: No components found for: CSFCULT    Results from last 7 days   Lab Units 07/16/20  1350 07/14/20  1139   URINE CULTURE   --  >100,000 cfu/ml Escherichia coli*  20,000-29,000 cfu/ml    C DIFF TOXIN B  Negative  --        Medications:   Scheduled Meds:    Current Facility-Administered Medications:  acetaminophen 650 mg Oral Q6H PRN [facility], DO    apixaban 2 5 mg Oral BID [facility], DO    calcitriol 0 25 mcg Oral Every Other Day [facility], DO    cefazolin 500 mg Intravenous Q12H [facility], DO    cholecalciferol 1,000 Units Oral Daily [facility], DO    citalopram 20 mg Oral Daily [facility], DO    levothyroxine 75 mcg Oral Early Morning [facility], DO    loperamide 2 mg Oral 4x Daily PRN [facility], DO    metoprolol tartrate 25 mg Oral BID [facility], DO    NIFEdipine 30 mg Oral Daily Nikolay Baker MD    potassium-sodium phosphateS 1 tablet Oral BID With Meals Mis Brown MD    ruxolitinib 10 mg Oral Daily [facility], DO    saccharomyces boulardii 250 mg Oral Daily [facility], DO    sodium bicarbonate infusion 75 mL/hr Intravenous Continuous Nikolay Baker MD Last Rate: 75 mL/hr (07/17/20 0100)   vancomycin 125 mg Oral Q12H [facility], DO      Continuous Infusions:    sodium bicarbonate infusion 75 mL/hr Last Rate: 75 mL/hr (07/17/20 0100)     PRN Meds:    acetaminophen 650 mg Q6H PRN   loperamide 2 mg 4x Daily PRN       Invasive lines and devices:   Invasive Devices     Peripheral Intravenous Line            Peripheral IV 07/16/20 Left;Ventral (anterior) Forearm 1 day          Drain            Urostomy Ileal conduit RUQ 1381 days                   Imaging Recent  Procedure: Ct Abdomen Pelvis Wo Contrast    Result Date: 7/14/2020  Narrative: CT ABDOMEN AND PELVIS WITHOUT IV CONTRAST INDICATION:   hisotry of ostomy, diarrhea, some blood present  COMPARISON:  January 11, 2019 CT from January 11, 2019 TECHNIQUE:  CT examination of the abdomen and pelvis was performed without intravenous contrast   Axial, sagittal, and coronal 2D reformatted images were created from the source data and submitted for interpretation  Radiation dose length product (DLP) for this visit:  830 68 mGy-cm   This examination, like all CT scans performed in the [facility], was performed utilizing techniques to minimize radiation dose exposure, including the use of iterative  reconstruction and automated exposure control  Enteric contrast was administered  FINDINGS: ABDOMEN LOWER CHEST:  No clinically significant abnormality identified in the visualized lower chest  LIVER/BILIARY TREE:  Unremarkable  GALLBLADDER:  Gallbladder surgically absent SPLEEN:  Spleen measures 11 6 cm PANCREAS:  Unremarkable  ADRENAL GLANDS:  Unremarkable  KIDNEYS/URETERS:  The left kidney is atrophic  Multiple calculi are noted within the right kidney which are nonobstructing  A calculus seen in the lower pole of the right kidney measuring about 7 mm an additional 4 to 5 mm calculus seen in the lower pole STOMACH AND BOWEL:  No abnormal dilation of the bowel loops 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  There is adjacent to a parastomal hernia APPENDIX:  No findings to suggest appendicitis  ABDOMINOPELVIC CAVITY:  No ascites  No pneumoperitoneum  No lymphadenopathy  Again noted is a rounded cystic area in the right iliac region which measures about 7 6 cm x 4 5 cm  This is unchanged VESSELS:  Unremarkable for patient's age  PELVIS REPRODUCTIVE ORGANS:  Unremarkable for patient's age  URINARY BLADDER:  Unremarkable  ABDOMINAL WALL/INGUINAL REGIONS:  Compression deformity of the T12 vertebra seen with mild retropulsion of posterior vertebral margin  This is not seen on the previous study of January 11, 2019 4 has been described on the radiograph of August 6, 2019 OSSEOUS STRUCTURES:  Degenerative changes seen in the lumbar spine at L5-S1 There is facet joint disease at L5-S1 the pars defect in the L5     Impression: No acute infiltrates descending There is no bowel obstruction 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 Again noted is a cystic mass in the right iliac fossa which may postoperative or may be of for adnexal region, stable, can be related with ultrasound Right renal calculus Atrophic left kidney  I personally discussed this study with [address] [place] [address] on 7/14/2020 at 12:57 PM  Workstation performed: [id number]     Procedure: Ct Head Without Contrast    Result Date: 7/15/2020  Narrative: CT BRAIN - WITHOUT CONTRAST INDICATION:   Dizziness, non-specific  COMPARISON:  May 23, 2019  August 27, 2019 TECHNIQUE:  CT examination of the brain was performed  In addition to axial images, coronal 2D reformatted images were created and submitted for interpretation  Radiation dose length product (DLP) for this visit:  810 52 mGy-cm   This examination, like all CT scans performed in the [facility], was performed utilizing techniques to minimize radiation dose exposure, including the use of iterative  reconstruction and automated exposure control  IMAGE QUALITY:  Diagnostic  FINDINGS: PARENCHYMA: Mild age-related volume loss  Decreased attenuation is noted in periventricular and subcortical white matter demonstrating an appearance that is statistically most likely to represent mild-moderate microangiopathic change  No CT signs of acute infarction    Stable partially calcified meningioma at the periphery of the atrium of right lateral ventricle measuring up to 12 mm, previously 12 mm  No mass effect or midline shift  No acute parenchymal hemorrhage  Bilateral physiologic basal ganglion calcifications  Arterial atherosclerosis  VENTRICLES AND EXTRA-AXIAL SPACES:  Normal for the patient's age  VISUALIZED ORBITS AND PARANASAL SINUSES:  Unremarkable  CALVARIUM AND EXTRACRANIAL SOFT TISSUES:  Normal      Impression: No acute intracranial abnormality  Stable presumed intraventricular meningioma  Workstation performed: [id number]       Code Status: Level 1 - Full Code    Portions of the record may have been created with voice recognition software  Occasional wrong word or "sound a like" substitutions may have occurred due to the inherent limitations of voice recognition software  Read the chart carefully and recognize, using context, where substitutions have occurred  Thank you for allowing me to take part in this patient's care  It was and always is both a privilege and an honor to get to work with her      Electronically signed by:  Zuleyma Dial, PGY 2, MD Lisa Cardozo MD  07/17/20 8892

## 2020-07-18 PROBLEM — D75.1 POLYCYTHEMIA: Status: ACTIVE | Noted: 2020-07-18

## 2020-07-18 LAB
ANION GAP SERPL CALCULATED.3IONS-SCNC: 9 MMOL/L (ref 4–13)
BUN SERPL-MCNC: 25 MG/DL (ref 5–25)
CALCIUM SERPL-MCNC: 7 MG/DL (ref 8.3–10.1)
CHLORIDE SERPL-SCNC: 106 MMOL/L (ref 100–108)
CO2 SERPL-SCNC: 27 MMOL/L (ref 21–32)
CREAT SERPL-MCNC: 2.9 MG/DL (ref 0.6–1.3)
ERYTHROCYTE [DISTWIDTH] IN BLOOD BY AUTOMATED COUNT: 14.6 % (ref 11.6–15.1)
GFR SERPL CREATININE-BSD FRML MDRD: 15 ML/MIN/1.73SQ M
GLUCOSE SERPL-MCNC: 87 MG/DL (ref 65–140)
HCT VFR BLD AUTO: 27.8 % (ref 34.8–46.1)
HGB BLD-MCNC: 9.1 G/DL (ref 11.5–15.4)
MCH RBC QN AUTO: 30 PG (ref 26.8–34.3)
MCHC RBC AUTO-ENTMCNC: 32.7 G/DL (ref 31.4–37.4)
MCV RBC AUTO: 92 FL (ref 82–98)
PLATELET # BLD AUTO: 202 THOUSANDS/UL (ref 149–390)
PMV BLD AUTO: 9.7 FL (ref 8.9–12.7)
POTASSIUM SERPL-SCNC: 2.9 MMOL/L (ref 3.5–5.3)
PROCALCITONIN SERPL-MCNC: 0.11 NG/ML
RBC # BLD AUTO: 3.03 MILLION/UL (ref 3.81–5.12)
SODIUM SERPL-SCNC: 142 MMOL/L (ref 136–145)
WBC # BLD AUTO: 3.01 THOUSAND/UL (ref 4.31–10.16)

## 2020-07-18 PROCEDURE — 97167 OT EVAL HIGH COMPLEX 60 MIN: CPT

## 2020-07-18 PROCEDURE — 80048 BASIC METABOLIC PNL TOTAL CA: CPT | Performed by: GENERAL PRACTICE

## 2020-07-18 PROCEDURE — 99232 SBSQ HOSP IP/OBS MODERATE 35: CPT | Performed by: GENERAL PRACTICE

## 2020-07-18 PROCEDURE — 84145 PROCALCITONIN (PCT): CPT | Performed by: GENERAL PRACTICE

## 2020-07-18 PROCEDURE — 85027 COMPLETE CBC AUTOMATED: CPT | Performed by: GENERAL PRACTICE

## 2020-07-18 PROCEDURE — 99232 SBSQ HOSP IP/OBS MODERATE 35: CPT | Performed by: INTERNAL MEDICINE

## 2020-07-18 PROCEDURE — 97163 PT EVAL HIGH COMPLEX 45 MIN: CPT

## 2020-07-18 RX ORDER — SODIUM BICARBONATE 650 MG/1
650 TABLET ORAL
Status: DISCONTINUED | OUTPATIENT
Start: 2020-07-18 | End: 2020-07-19

## 2020-07-18 RX ORDER — POTASSIUM CHLORIDE 29.8 MG/ML
40 INJECTION INTRAVENOUS ONCE
Status: COMPLETED | OUTPATIENT
Start: 2020-07-18 | End: 2020-07-18

## 2020-07-18 RX ORDER — LANOLIN ALCOHOL/MO/W.PET/CERES
3 CREAM (GRAM) TOPICAL
Status: DISCONTINUED | OUTPATIENT
Start: 2020-07-18 | End: 2020-07-19 | Stop reason: HOSPADM

## 2020-07-18 RX ORDER — POTASSIUM CHLORIDE 20 MEQ/1
40 TABLET, EXTENDED RELEASE ORAL ONCE
Status: COMPLETED | OUTPATIENT
Start: 2020-07-18 | End: 2020-07-18

## 2020-07-18 RX ADMIN — Medication 125 MG: at 21:26

## 2020-07-18 RX ADMIN — CEFAZOLIN 500 MG: 1 INJECTION, POWDER, FOR SOLUTION INTRAMUSCULAR; INTRAVENOUS at 05:46

## 2020-07-18 RX ADMIN — APIXABAN 2.5 MG: 2.5 TABLET, FILM COATED ORAL at 17:33

## 2020-07-18 RX ADMIN — CITALOPRAM HYDROBROMIDE 20 MG: 20 TABLET ORAL at 08:20

## 2020-07-18 RX ADMIN — Medication 125 MG: at 08:20

## 2020-07-18 RX ADMIN — DIBASIC SODIUM PHOSPHATE, MONOBASIC POTASSIUM PHOSPHATE AND MONOBASIC SODIUM PHOSPHATE 1 TABLET: 852; 155; 130 TABLET ORAL at 07:25

## 2020-07-18 RX ADMIN — MELATONIN 1000 UNITS: at 08:20

## 2020-07-18 RX ADMIN — SODIUM BICARBONATE 75 ML/HR: 84 INJECTION, SOLUTION INTRAVENOUS at 06:40

## 2020-07-18 RX ADMIN — MELATONIN 3 MG: at 21:26

## 2020-07-18 RX ADMIN — CALCITRIOL 0.25 MCG: 0.25 CAPSULE, LIQUID FILLED ORAL at 08:20

## 2020-07-18 RX ADMIN — SODIUM BICARBONATE 650 MG TABLET 650 MG: at 17:33

## 2020-07-18 RX ADMIN — RUXOLITINIB 10 MG: 10 TABLET ORAL at 08:20

## 2020-07-18 RX ADMIN — POTASSIUM CHLORIDE 40 MEQ: 1500 TABLET, EXTENDED RELEASE ORAL at 11:29

## 2020-07-18 RX ADMIN — Medication 250 MG: at 08:20

## 2020-07-18 RX ADMIN — CEFAZOLIN 500 MG: 1 INJECTION, POWDER, FOR SOLUTION INTRAMUSCULAR; INTRAVENOUS at 17:33

## 2020-07-18 RX ADMIN — LEVOTHYROXINE SODIUM 75 MCG: 75 TABLET ORAL at 05:46

## 2020-07-18 RX ADMIN — DIBASIC SODIUM PHOSPHATE, MONOBASIC POTASSIUM PHOSPHATE AND MONOBASIC SODIUM PHOSPHATE 1 TABLET: 852; 155; 130 TABLET ORAL at 15:39

## 2020-07-18 RX ADMIN — APIXABAN 2.5 MG: 2.5 TABLET, FILM COATED ORAL at 08:20

## 2020-07-18 RX ADMIN — METOPROLOL TARTRATE 25 MG: 25 TABLET, FILM COATED ORAL at 08:20

## 2020-07-18 RX ADMIN — NIFEDIPINE 30 MG: 30 TABLET, FILM COATED, EXTENDED RELEASE ORAL at 08:20

## 2020-07-18 RX ADMIN — METOPROLOL TARTRATE 25 MG: 25 TABLET, FILM COATED ORAL at 17:33

## 2020-07-18 RX ADMIN — POTASSIUM CHLORIDE 40 MEQ: 29.8 INJECTION, SOLUTION INTRAVENOUS at 12:01

## 2020-07-18 NOTE — ASSESSMENT & PLAN NOTE
Estimated Creatinine Clearance: 17 1 mL/min (A) (by C-G formula based on SCr of 2 9 mg/dL (H))    CKD4-5  Cr appears to be at baseline high 2s to low 3s  Sees Dr Evelyn Gautam outpt  Renal appreciated

## 2020-07-18 NOTE — ASSESSMENT & PLAN NOTE
C/w BB  Procardia added as BP elevated  If BP persistently > 150/90, can change BB to Coreg 25 mg bid per renal

## 2020-07-18 NOTE — ASSESSMENT & PLAN NOTE
C/w Jakafi  Monitor CBC-D - if Hgb decreased, will talk to hematology about decreasing dose of Jakafi

## 2020-07-18 NOTE — PHYSICAL THERAPY NOTE
Physical Therapy Evaluation    Patient's Name: Jann Garcia    Admitting Diagnosis  Hallucinations [R44 3]  Shingles [B02 9]  Dizziness [R42]  Syncope [X49]  Metabolic acidosis [D29 3]  Meningioma ([facility] [address] ) [D32 9]  T wave inversion in EKG [R94 31]  Stage 4 chronic kidney disease ([address] ) [N18 4]    Problem List  Patient Active Problem List   Diagnosis    Chronic saddle pulmonary embolism (HCC)    Stage 4 chronic kidney disease ([address]    Bladder mass    Small bowel obstruction ([address]    Obstructive uropathy    Secondary hyperparathyroidism of renal origin ([facility] [address] )    Hypothyroidism    Hypertension    Polycythemia vera ([address]    Fall    Subdural hematoma, acute (HCC)    Intraventricular hemorrhage (HCC)    Diarrhea    Hypokalemia    Recurrent Clostridium difficile diarrhea    Acidosis, metabolic    Anemia in CKD (chronic kidney disease)    Mass of urethra    Stage 5 chronic kidney disease not on chronic dialysis ([address] )    Thoracic compression fracture (HCC)    Hematuria    Abnormal finding on MRI of brain    Benign neoplasm of supratentorial region of brain ([address]    Meningioma ([address]    Toxic metabolic encephalopathy    UTI (urinary tract infection)    Herpes zoster    Inverted T wave       Past Medical History  Past Medical History:   Diagnosis Date    Anxiety     Chronic kidney disease (CKD), stage IV (severe) (HCC)     stage IV    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    Compression fracture of cervical spine (HCC)     Hydronephrosis     Hypertension     Hypothyroid     Incontinence     Lung mass     Improving on PET/CT 1/2016    Polycythemia vera ([address] )     Pulmonary embolism ([facility] [address] ) 2014    Urinary tract infection        Past Surgical History  Past Surgical History:   Procedure Laterality Date    BLADDER SUSPENSION      BOTOX INJECTION N/A 7/27/2016    Procedure: BOTOX INJECTION ;  Surgeon: Kaity Reina MD;  [facility];  Service:    [employer] CHOLECYSTECTOMY N/A     COLONOSCOPY      CYSTECTOMY, RADICAL WITH ILEOCONDUIT N/A 10/4/2016    Procedure: SUPRATRIGONAL CYSTECTOMY WITH ILEAL CONDUIT ;  Surgeon: Perfecto Hardwick MD;  [facility];  Service:    [employer] CYSTOSCOPY W/ RETROGRADES Bilateral 7/27/2016    Procedure: Rhianna Mixer; RETROGRADE PYELOGRAM ;  Surgeon: Perfecto Hardwick MD;  [facility];  Service:     IR CENTRAL LINE PLACEMENT  7/17/2020    HI COLONOSCOPY FLX DX W/COLLJ SPEC WHEN PFRMD N/A 8/31/2016    Procedure: COLONOSCOPY;  Surgeon: Belem Brewster MD;  Location: [facility]; Service: Gastroenterology    HI CYSTOSCOPY,INSERT URETERAL STENT Bilateral 7/27/2016    Procedure: STENT INSERTION; EXCISION OF MESH ;  Surgeon: Perfecto Hardwick MD;  [facility];  Service: Urology    TONSILLECTOMY      TUBAL LIGATION      WISDOM TOOTH EXTRACTION          07/18/20 1015   Note Type   Note type Eval only   Pain Assessment   Pain Assessment Tool 0-10   Pain Score 5   Pain Location/Orientation Location: Buttocks   Patient's Stated Pain Goal No pain   Hospital Pain Intervention(s) Ambulation/increased activity   Home Living   Type of Home House   Additional Comments Resides w/ son who is Autistic but high functioning in 2 [place] home, 6-7 MARLON  Indep self care, ambulates w/ out device  drives PTA   Prior Function   Level of [place] Independent with ADLs and functional mobility   Falls in the last 6 months 0   Restrictions/Precautions   Weight Bearing Precautions Per Order No   Other Precautions Contact/isolation;Multiple lines;Telemetry; Fall Risk;Pain   General   Family/Caregiver Present Yes  (elliot)   Cognition   Overall Cognitive Status WFL   Arousal/Participation Responsive   Orientation Level Oriented X4   Memory Unable to assess   Following Commands Follows one step commands without difficulty   RLE Assessment   RLE Assessment   (strength grossly 4/5)   LLE Assessment   LLE Assessment   (strength grossly 4/5)   Coordination   Movements are Fluid and Coordinated 1   Transfers   Sit to Stand 4  Minimal assistance   Additional items Assist x 1   Stand to Sit 4  Minimal assistance   Additional items Assist x 1   Ambulation/Elevation   Gait pattern   (slow, wide MITRA)   Gait Assistance 4  Minimal assist   Additional items Assist x 1   Assistive Device None   Distance 60'x2 w/ 3 min seated rest   Balance   Static Sitting Normal   Dynamic Sitting Good   Static Standing Fair   Dynamic Standing Fair   Ambulatory Fair -   Endurance Deficit   Endurance Deficit Yes   Endurance Deficit Description fatigue, weakness, pain   Activity Tolerance   Activity Tolerance Patient limited by fatigue;Treatment limited secondary to medical complications (Comment)   Nurse Made Aware yes   Assessment   Prognosis Good   Problem List Decreased strength;Decreased endurance; Impaired balance;Decreased mobility   Assessment Pt seen for high complexity physical therapy evaluation  Pt is a 76 y/o female w/ history/comorbidities of CKD, c-diff, PE, HTN, hypothyroidism, who is now admitted w/ hallucinations, generalized weakness, diarrhea  Dx include shingles on buttocks, UTI, toxic metabolic encephalopathy  Due to acute medical issues, pain, fall risk, note unstable clinical picture  PT consulted to assess mobility, d/c needs  Pt presents w/ decreased functional mobk standing balance, endurance, B LE strength, barriers at home  will benefit from skilled PT to [facility] for the above problems  Recommend home w/ home therapies when stable   Goals   Patient Goals to feel better   STG Expiration Date 08/01/20   Short Term Goal #1 1-2 wks: bed mob and transfers w/ indep, standing balance to good/normal, ambulate 200-300 ft w/ indep, increase B LE strength by 1/2 -1 grade, ambulate 1 flight if stairs w/ indep   PT Treatment Day 0   Plan   Treatment/Interventions Functional transfer training;LE strengthening/ROM; Elevations; Endurance training; Therapeutic exercise;Patient/family training;Equipment eval/education; Bed mobility;Gait training   PT Frequency Other (Comment)  (3-5x/wk)   Recommendation   PT Discharge Recommendation Home with skilled therapy; Return to previous environment with social support  (home therapies)   PT - OK to Discharge Yes  (when stable to home w/ home therapies)   Modified [place] Scale   Modified [place] Scale 4           Jama Brewster PT, DPT, CSRS

## 2020-07-18 NOTE — PLAN OF CARE
Problem: PHYSICAL THERAPY ADULT  Goal: Performs mobility at highest level of function for planned discharge setting  See evaluation for individualized goals  Description  Treatment/Interventions: Functional transfer training, LE strengthening/ROM, Elevations, Endurance training, Therapeutic exercise, Patient/family training, Equipment eval/education, Bed mobility, Gait training          See flowsheet documentation for full assessment, interventions and recommendations  Note:   Prognosis: Good  Problem List: Decreased strength, Decreased endurance, Impaired balance, Decreased mobility  Assessment: Pt seen for high complexity physical therapy evaluation  Pt is a 78 y/o female w/ history/comorbidities of CKD, c-diff, PE, HTN, hypothyroidism, who is now admitted w/ hallucinations, generalized weakness, diarrhea  Dx include shingles on buttocks, UTI, toxic metabolic encephalopathy  Due to acute medical issues, pain, fall risk, note unstable clinical picture  PT consulted to assess mobility, d/c needs  Pt presents w/ decreased functional mobk standing balance, endurance, B LE strength, barriers at home  will benefit from skilled PT to [facility] for the above problems  Recommend home w/ home therapies when stable        PT Discharge Recommendation: Home with skilled therapy, Return to previous environment with social support(home therapies)     PT - OK to Discharge: (S) Yes(when stable to home w/ home therapies)    See flowsheet documentation for full assessment

## 2020-07-18 NOTE — ASSESSMENT & PLAN NOTE
2/2 bicarb gtt  Today w/ K of 2 9 give KCl 40 mEq IV and Kdur 40 mEq PO  Tele until K > 3    Bicarb gtt -> Bicarb PO  Recheck BMP tomorrow

## 2020-07-18 NOTE — PLAN OF CARE
Problem: OCCUPATIONAL THERAPY ADULT  Goal: Performs self-care activities at highest level of function for planned discharge setting  See evaluation for individualized goals  Description  Treatment Interventions: ADL retraining, Functional transfer training, UE strengthening/ROM, Endurance training, Cognitive reorientation, Patient/family training, Equipment evaluation/education, Compensatory technique education, Continued evaluation, Activityengagement  Equipment Recommended: (use of builtin shower seat)       See flowsheet documentation for full assessment, interventions and recommendations  Note:   Limitation: Decreased ADL status, Decreased cognition, Decreased endurance, Decreased self-care trans, Decreased high-level ADLs  Prognosis: Fair  Assessment: Pt is a 77year old female seen for initial OT evaluation s/p admission to [facility] 7/15/20 with hallucinations and dizziness  Of note, pt was d/c from ED the previous day after presenting with diarrhea and rash on R buttocks dx'd with shingles and UTI  Pt now dx'd with toxic metabolic encephalopathy likely 2* Valtrex toxicity in setting of CKD5  Additional active problems include: herpes zoster, metabolic acidosis, diarrhea of presumed infections origin, and chronic saddle PE  Pt with active OT orders and cleared by RN for OT evaluation and OOB mobility  Pt resides with her high functioning autistic son [facility] with 6-7 MARLON and full flight to bed/bath  Pt reports being I with ADLs, IADLs, and functional mobility without use of DME PTA  Pt is a   Pt is currently demonstrating the following occupational deficits: eating with set-up, grooming with set-up, UB bathing with supervision, LB bathing with Nathan, UB dressing with Nathan, LB dressing with Nathan, toileting with supervision, functional transfers with Nathan and functional mobility with Nathan    Factors currently limiting pt's independence in these areas include: generalized weakness, endurance, activity tolerance, balance, functional mobility, (?) cognitive deficits, and limited support at home  From an OT standpoint, recommend home OT upon d/c  Pt is to continue to benefit from skilled occupational therapy services while in the hospital to maximize functioning and independence with daily activities  See below for OT goals to be addressed 3-5x/wk       OT Discharge Recommendation: Home with skilled therapy(home OT)

## 2020-07-18 NOTE — PROGRESS NOTES
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, history of nonfunctional bladder, prior history of bilateral hydronephrosis, status post cystectomy with ileal conduit in 2016, polycythemia vera, history of PE, presented with diarrhea and rash   We are consulted for CKD management      CKD stage 4 to 5, baseline creatinine high 2s to low 3s, follows with Dr Leah Cao  -creatinine 3 2 now improving to 2 9 today   -will discontinue bicarb drip  -BMP in a m   -continue to avoid Valtrex, Bactrim (she admits to be taking both on 7/14/20)  -CKD suspected to be secondary to obstructive uropathy, likely functional solitary right kidney  -CT scan of abdomen does not report hydronephrosis, shows atrophic left kidney      Low bicarb, presume hyperchloremic non-anion gap metabolic acidosis in the setting of ileal conduit, advanced renal failure  -serum bicarb now has overall much improved   Discontinue bicarb drip    -she is willing to try p o  Bicarb supplement again  Will start one tablet p o  B i d  Hypokalemia, serum potassium 2 9  Receiving total 80 mEq potassium chloride once today  Magnesium level stable     Anemia in CKD, hemoglobin overall stable   Continue to closely monitor   Transfuse p r n  For hemoglobin less than seven      Nonfunctional bladder history, status post cystectomy with ileal conduit in 2016  Follows with Urology as an outpatient      Secondary hyperparathyroidism, on calcitriol  Hypophosphatemia, serum phosphorus improving with phosphorus supplement  Neutra-Phos one packet p o  B i d  For total 3 days      Hypertension, BP  seems to be overall better with occasional high readings    -currently on metoprolol 25 mg b i d ,  Started patient on nifedipine XL 30 mg daily with holding parameter  - if blood pressure remains greater than 150/90 persistently, consider changing metoprolol to carvedilol  25 mg b i d      Suspected UTI, UA which is ileal conduit specimen shows 3+ proteinuria, concentrated sample, innumerable RBCs/WBCs   -urine culture shows GNR  -currently on antibiotic as per primary team     Altered mental status, seems to have improved at the time of my encounter today   management as per primary team  -?  Metabolic encephalopathy in the setting of suspected UTI, use of Valtrex     Herpes zoster, management as per primary team   Diarrhea, C diff  negative     Discussed above plan in detail with primary team     SUBJECTIVE:  Patient seen and examined at bedside   No chest pain, shortness of breath, nausea, vomiting, abdominal pain    OBJECTIVE:  Current Weight: Weight - Scale: 91 2 kg (201 lb)  Vitals:    07/18/20 0811   BP: 145/90   Pulse:    Resp: 14   Temp: 97 9 °F (36 6 °C)   SpO2:        Intake/Output Summary (Last 24 hours) at 7/18/2020 1300  Last data filed at 7/18/2020 1235  Gross per 24 hour   Intake 1195 ml   Output 200 ml   Net 995 ml     Wt Readings from Last 3 Encounters:   07/15/20 91 2 kg (201 lb)   06/24/20 91 2 kg (201 lb)   01/13/20 89 8 kg (198 lb)     Temp Readings from Last 3 Encounters:   07/18/20 97 9 °F (36 6 °C)   07/14/20 97 7 °F (36 5 °C) (Oral)   06/24/20 98 2 °F (36 8 °C) (Oral)     BP Readings from Last 3 Encounters:   07/18/20 145/90   07/14/20 (!) 179/92   06/24/20 126/82     Pulse Readings from Last 3 Encounters:   07/17/20 71   07/14/20 58   06/24/20 72        Physical Examination:  General:  Sitting in chair, no acute distress   Eyes:  Mild conjunctival pallor present  ENT:  External examination of ears and nose unremarkable  Neck:  Obvious lymphadenopathy appreciated  Respiratory:  Bilateral air entry present  CVS:  S1, S2 present  GI:  Soft, nondistended, status post ileal conduit present  CNS:  Active alert oriented x3  Musculoskeletal:  No obvious gross deformity noted    Medications:    Current Facility-Administered Medications:     acetaminophen (TYLENOL) tablet 650 mg, 650 mg, Oral, Q6H PRN, Winter Reyess DO    apixaban (ELIQUIS) tablet 2 5 mg, 2 5 mg, Oral, BID, Winter Perks, DO, 2 5 mg at 07/18/20 0820    calcitriol (ROCALTROL) capsule 0 25 mcg, 0 25 mcg, Oral, Every Other Day, Winter Do DO, 0 25 mcg at 07/18/20 0820    ceFAZolin (ANCEF) 500 mg in sodium chloride 0 9 % 50 mL IVPB, 500 mg, Intravenous, Q12H, Winter Do DO, Last Rate: 100 mL/hr at 07/18/20 0546, 500 mg at 07/18/20 0546    cholecalciferol (VITAMIN D3) tablet 1,000 Units, 1,000 Units, Oral, Daily, Winter Do DO, 1,000 Units at 07/18/20 0820    citalopram (CeleXA) tablet 20 mg, 20 mg, Oral, Daily, Winter Do DO, 20 mg at 07/18/20 0820    levothyroxine tablet 75 mcg, 75 mcg, Oral, Early Morning, Winter ReyessDO, 75 mcg at 07/18/20 0546    loperamide (IMODIUM) capsule 2 mg, 2 mg, Oral, 4x Daily PRN, Winter Do DO    melatonin tablet 3 mg, 3 mg, Oral, HS, Winter Perks, DO    metoprolol tartrate (LOPRESSOR) tablet 25 mg, 25 mg, Oral, BID, Winter Do DO, 25 mg at 07/18/20 0820    NIFEdipine (PROCARDIA XL) 24 hr tablet 30 mg, 30 mg, Oral, Daily, Anabelle Mera MD, 30 mg at 07/18/20 0820    potassium chloride 40 mEq IVPB (premix), 40 mEq, Intravenous, Once, Winter Do DO, Last Rate: 25 mL/hr at 07/18/20 1201, 40 mEq at 07/18/20 1201    potassium-sodium phosphateS (K-PHOS,PHOSPHA 250) -250 mg tablet 1 tablet, 1 tablet, Oral, BID With Meals, Anabelle Mera MD, 1 tablet at 07/18/20 0725    ruxolitinib (JAKAFI) tablet 10 mg, 10 mg, Oral, Daily, Winter Do DO, 10 mg at 07/18/20 0820    saccharomyces boulardii (FLORASTOR) capsule 250 mg, 250 mg, Oral, Daily, Winter Do DO, 250 mg at 07/18/20 0820    sodium bicarbonate 150 mEq in dextrose 5 % 1,000 mL infusion, 75 mL/hr, Intravenous, Continuous, Nikolay Baker MD, Last Rate: 75 mL/hr at 07/18/20 0640, 75 mL/hr at 07/18/20 0640    vancomycin (VANCOCIN) oral solution 125 mg, 125 mg, Oral, Q12H LONGMasoud DO, 125 mg at 07/18/20 0820    Laboratory Results:  Results from last 7 days   Lab Units 07/18/20  0603 07/17/20  1516 07/16/20  0519 07/15/20  0716 07/14/20  1333 07/14/20  1129   WBC Thousand/uL 3 01* 4 48 3 34* 3 91*  --  4 07*   HEMOGLOBIN g/dL 9 1* 10 5* 10 3* 10 5*  --  12 1   HEMATOCRIT % 27 8* 31 4* 31 0* 31 1*  --  36 6   PLATELETS Thousands/uL 202 282 199 205  --  249   SODIUM mmol/L 142 144 136 137 137 135*   POTASSIUM mmol/L 2 9* 3 2* 4 0 4 3 4 3 4 0   CHLORIDE mmol/L 106 111* 109* 113* 113* 109*   CO2 mmol/L 27 25 18* 16* 17* 16*   BUN mg/dL 25 29* 32* 40* 37* 38*   CREATININE mg/dL 2 90* 3 17* 3 26* 3 37* 3 11* 3 45*   CALCIUM mg/dL 7 0* 7 9* 8 0* 8 3 8 5 8 8   MAGNESIUM mg/dL  --   --  2 0  --   --   --    PHOSPHORUS mg/dL  --  3 7 2 0*  --   --   --        IR central line placement   Final Result by Clovis Ventura MD (07/17 1436)   Impression:   Ultrasound and fluoroscopically guided placement of a 5-[place] double-lumen power injectable central venous catheter via the right external jugular vein      Catheter is ready for immediate use  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

## 2020-07-18 NOTE — PROGRESS NOTES
Progress Note - Jhon Meza 1945, 77 y o  female MRN: [id number]    Unit/Bed#: [address] 722-01 Encounter: [id number]    Primary Care Provider: Arelis Santillan MD   Date and time admitted to hospital: 7/15/2020  6:41 AM        * Toxic metabolic encephalopathy  Assessment & Plan  Likely 2/2 Valtrex toxicity in setting of CKD4-5  Stop Valtrex  Improving      Polycythemia  Assessment & Plan  C/w Jakafi  Monitor CBC-D - if Hgb decreased, will talk to hematology about decreasing dose of Jakafi    Inverted T wave  Assessment & Plan  No CP and neg trop  No need for further w/u at this time    Herpes zoster  Assessment & Plan  In anal region  Pt took Valtrex 1 Gm x 2 8 hours apart  In setting of hallucinations stop Valtrex  Pt asymptomatic from this at this time so hold off on antivirals      UTI (urinary tract infection)  Assessment & Plan  Pt has ileal conduit  Stop Bactrim in setting of CKD4-5 - took dose 7/14  Rocephin de-escalated to Ancef  Vanco prophylaxis  Cx > 100,000 E coli pansensitive  Procal was mildly elevated in pt with CKD now WNL  When stable from renal standpoint, will d/c on Keflex - will need abx through 7/20    Stage 5 chronic kidney disease not on chronic dialysis [facility])  Assessment & Plan  Estimated Creatinine Clearance: 17 1 mL/min (A) (by C-G formula based on SCr of 2 9 mg/dL (H))  CKD4-5  Cr appears to be at baseline high 2s to low 3s  Sees Dr Cordoba Ray outpt  Renal appreciated    Acidosis, metabolic  Assessment & Plan  2/2 CKD  Renal consult appreciated  Improved w/ bicarb gtt - now on bicarb tabs    Hypokalemia  Assessment & Plan  2/2 bicarb gtt  Today w/ K of 2 9 give KCl 40 mEq IV and Kdur 40 mEq PO  Tele until K > 3    Bicarb gtt -> Bicarb PO  Recheck BMP tomorrow    Diarrhea  Assessment & Plan  POA  C diff neg  Vanco prophylaxis in pt w/ hx C diff on abx needed through 7/22   Immodium prn    Hypertension  Assessment & Plan  C/w BB  Procardia added as BP elevated  If BP persistently > 150/90, can change BB to Coreg 25 mg bid per renal    Chronic saddle pulmonary embolism (HCC)  Assessment & Plan  C/w Eliquis low dose (in setting of CKD4-5)    VTE Pharmacologic Prophylaxis:   Pharmacologic: Apixaban (Eliquis)  Mechanical VTE Prophylaxis in Place: Yes    Patient Centered Rounds: I have performed bedside rounds with nursing staff today  Discussions with Specialists or Other Care Team Provider: renal    Education and Discussions with Family / Patient: pt and son    Time Spent for Care: 30 minutes  More than 50% of total time spent on counseling and coordination of care as described above  Current Length of Stay: 3 day(s)    Current Patient Status: Inpatient   Certification Statement: The patient will continue to require additional inpatient hospital stay due to need to monitor K    Discharge Plan: tomorrow if CBC-D and BMP acceptable    Code Status: Level 1 - Full Code      Subjective:   No acute complaints    Objective:     Vitals:   Temp (24hrs), Av 3 °F (36 8 °C), Min:97 9 °F (36 6 °C), Max:98 6 °F (37 °C)    Temp:  [97 9 °F (36 6 °C)-98 6 °F (37 °C)] 98 6 °F (37 °C)  HR:  [76] 76  Resp:  [14-18] 18  BP: (145-165)/(88-93) 146/88  SpO2:  [100 %] 100 %  Body mass index is 39 26 kg/m²  Input and Output Summary (last 24 hours): Intake/Output Summary (Last 24 hours) at 2020 1644  Last data filed at 2020 1235  Gross per 24 hour   Intake 1195 ml   Output 200 ml   Net 995 ml       Physical Exam:     Physical Exam   Constitutional: She is oriented to person, place, and time  No distress  HENT:   Head: Normocephalic and atraumatic  Eyes: Conjunctivae and EOM are normal    Neck: Normal range of motion  Cardiovascular: Normal rate and regular rhythm  Pulmonary/Chest: Effort normal and breath sounds normal  She has no wheezes  She has no rales  Abdominal: Soft  Bowel sounds are normal  She exhibits no distension  There is no tenderness     Musculoskeletal: Normal range of motion  She exhibits no edema  Neurological: She is alert and oriented to person, place, and time  Skin: Skin is warm and dry  She is not diaphoretic  Additional Data:     Labs:    Results from last 7 days   Lab Units 07/18/20  0603 07/17/20  1516   WBC Thousand/uL 3 01* 4 48   HEMOGLOBIN g/dL 9 1* 10 5*   HEMATOCRIT % 27 8* 31 4*   PLATELETS Thousands/uL 202 282   NEUTROS PCT %  --  59   LYMPHS PCT %  --  29   MONOS PCT %  --  9   EOS PCT %  --  1     Results from last 7 days   Lab Units 07/18/20  0603  07/14/20  1129   POTASSIUM mmol/L 2 9*   < > 4 0   CHLORIDE mmol/L 106   < > 109*   CO2 mmol/L 27   < > 16*   BUN mg/dL 25   < > 38*   CREATININE mg/dL 2 90*   < > 3 45*   CALCIUM mg/dL 7 0*   < > 8 8   ALK PHOS U/L  --   --  72   ALT U/L  --   --  10*   AST U/L  --   --  16    < > = values in this interval not displayed  * I Have Reviewed All Lab Data Listed Above  * Additional Pertinent Lab Tests Reviewed:  [facility] Admission Reviewed        Recent Cultures (last 7 days):     Results from last 7 days   Lab Units 07/16/20  1350 07/14/20  1139   URINE CULTURE   --  >100,000 cfu/ml Escherichia coli*  20,000-29,000 cfu/ml    C DIFF TOXIN B  Negative  --        Last 24 Hours Medication List:     Current Facility-Administered Medications:  acetaminophen 650 mg Oral Q6H PRN Lonza Ivan, DO    apixaban 2 5 mg Oral BID Justo Love DO    calcitriol 0 25 mcg Oral Every Other Day Justo Love, DO    cefazolin 500 mg Intravenous Q12H Justo Love DO Last Rate: 500 mg (07/18/20 0546)   cholecalciferol 1,000 Units Oral Daily Lonza Ivan, DO    citalopram 20 mg Oral Daily Justo Love DO    levothyroxine 75 mcg Oral Early Morning Lonza Ivan, DO    loperamide 2 mg Oral 4x Daily PRN Lonza Ivan, DO    melatonin 3 mg Oral HS Justo Love, DO    metoprolol tartrate 25 mg Oral BID Justo Love DO    NIFEdipine 30 mg Oral Daily Vianney Young MD    ruxolitinib 10 mg Oral Daily Isabel Milligan DO    saccharomyces boulardii 250 mg Oral Daily Isabel Milligan DO    sodium bicarbonate 650 mg Oral BID after meals Maureen Martel MD    vancomycin 125 mg Oral Q12H 221 Ej mAin DO         Today, Patient Was Seen By: Isabel Milligan DO    ** Please Note: Dictation voice to text software may have been used in the creation of this document   **

## 2020-07-18 NOTE — ASSESSMENT & PLAN NOTE
Pt has ileal conduit  Stop Bactrim in setting of CKD4-5 - took dose 7/14  Rocephin de-escalated to Ancef  Vanco prophylaxis  Cx > 100,000 E coli pansensitive  Procal was mildly elevated in pt with CKD now WNL  When stable from renal standpoint, will d/c on Keflex - will need abx through 7/20

## 2020-07-18 NOTE — OCCUPATIONAL THERAPY NOTE
Occupational Therapy Evaluation     Patient Name: Jhon Meza  [id number] Date: 7/18/2020  Problem List  Principal Problem:    Toxic metabolic encephalopathy  Active Problems:    Chronic saddle pulmonary embolism (HCC)    Hypertension    Diarrhea    Hypokalemia    Acidosis, metabolic    Stage 5 chronic kidney disease not on chronic dialysis ([address] )    UTI (urinary tract infection)    Herpes zoster    Inverted T wave    Past Medical History  Past Medical History:   Diagnosis Date    Anxiety     Chronic kidney disease (CKD), stage IV (severe) (HCC)     stage IV    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    Compression fracture of cervical spine (HCC)     Hydronephrosis     Hypertension     Hypothyroid     Incontinence     Lung mass     Improving on PET/CT 1/2016    Polycythemia vera ([address] )     Pulmonary embolism ([address] ) 2014    Urinary tract infection      Past Surgical History  Past Surgical History:   Procedure Laterality Date    BLADDER SUSPENSION      BOTOX INJECTION N/A 7/27/2016    Procedure: BOTOX INJECTION ;  Surgeon: Glenis Lugo MD;  [facility];  Service:     CHOLECYSTECTOMY N/A     COLONOSCOPY      CYSTECTOMY, RADICAL WITH ILEOCONDUIT N/A 10/4/2016    Procedure: [address] [place] [address] ;  Surgeon: Glenis Lugo MD;  [facility];  Service:    Ryan Bumps W/ RETROGRADES Bilateral 7/27/2016    Procedure: Shauna Rodriguez; RETROGRADE PYELOGRAM ;  Surgeon: Glenis Lugo MD;  [facility];  Service:     IR CENTRAL LINE PLACEMENT  7/17/2020    [place] COLONOSCOPY FLX DX W/COLLJ SPEC WHEN PFRMD N/A 8/31/2016    Procedure: COLONOSCOPY;  Surgeon: Nasrin Palmer MD;  Location: [facility];   Service: Gastroenterology    [place] CYSTOSCOPY,INSERT URETERAL STENT Bilateral 7/27/2016    Procedure: STENT INSERTION; EXCISION OF MESH ;  Surgeon: Glenis Lugo MD;  [facility];  Service: Urology    TONSILLECTOMY     [facility] TOOTH EXTRACTION          07/18/20 1025   Note Type   Note type Eval only   Restrictions/Precautions   Weight Bearing Precautions Per Order No   Other Precautions Contact/isolation;Cognitive;Multiple lines;Telemetry; Fall Risk;Pain   Pain Assessment   Pain Assessment Tool 0-10   Pain Score No Pain   Home Living   Type of Home [facility] with 6-7STE)   Home Layout Two level   Bathroom Shower/Tub Walk-in shower   Bathroom Toilet Standard   Bathroom Equipment Built-in shower seat;Grab bars in shower   Home Equipment Walker;Cane   Additional Comments Pt resides with her autistic son in [facility] with 6-7STE  Pt's other son is present for evaluation and plans to stay with patient upon d/c  Autistic son is fairly self sufficient, but is not able to provide pt with any assist   Prior Function   Level of [place] Independent with ADLs and functional mobility   Lives With Son   Receives Help From Family   ADL Assistance Independent   IADLs Independent   Falls in the last 6 months 0   Vocational Retired   Lifestyle   Autonomy Pt reports being completely I with ADLs, IADLs, and functional mobility without use of DME PTA  Pt is a  and manages her medications   Reciprocal Relationships supportive son plans to stay with pt and autistic son   Service to Others retired   Psychosocial   Psychosocial (WDL) WDL   Length of Time/Family Visitation 6-15 min   Subjective   Subjective "I don't know how anyone can do drugs    Those hallucinations were awful"   ADL   Eating Assistance 5  Supervision/Setup   Grooming Assistance 5  Supervision/Setup   UB Bathing Assistance 5  Supervision/Setup   UB Bathing Deficit   (seated)   LB Bathing Assistance 4  Minimal Assistance   LB Bathing Deficit   (seated)   [address]  5  Supervision/Setup   Bed Mobility   Additional Comments pt seated in recliner upon therapist entry and bed mobility not assessed at this time   Transfers   Sit to Stand 4  Minimal assistance   Additional items Assist x 1; Increased time required;Verbal cues   Stand to Sit 4  Minimal assistance   Additional items Assist x 1; Increased time required;Verbal cues   Additional Comments Pt completed functional transfers without use of DME  Mildly unsteady upon standing   Functional Mobility   Functional Mobility 4  Minimal assistance   Additional Comments Ax1 to ambulate <house hold distances with 1 seated rest break 2* significant fatigue  Balance   Static Sitting Fair +   Dynamic Sitting Fair   Static Standing Fair -   Dynamic Standing Fair -   Ambulatory Fair -   Activity Tolerance   Activity Tolerance Patient limited by fatigue   Medical Staff Made Aware  [facility] Pt cleared by JOHNATHAN Perez for OT evaluation and OOB mobility  RN changed pt's shingles bandages and reports no need for airborne precautions at this time since lesions are covered    RUE Assessment   RUE Assessment WFL  (generalized weakness)   LUE Assessment   LUE Assessment WFL  (generalized weakness)   Sensation   Light Touch No apparent deficits   Vision-Basic Assessment   Current Vision Wears glasses all the time   Cognition   Arousal/Participation Alert; Responsive; Cooperative   Attention Attends with cues to redirect   Orientation Level Oriented X4   Memory Within functional limits   Following Commands Follows one step commands without difficulty   Comments Pt is very pleasant, cooperative  Reports no hallucination for the past few days  Plan to complete formal cognitive assesssment during pt's POC   Assessment   Limitation Decreased ADL status; Decreased cognition;Decreased endurance;Decreased self-care trans;Decreased high-level ADLs   Prognosis Fair   Assessment Pt is a 77year old female seen for initial OT evaluation s/p admission to [facility] 7/15/20 with hallucinations and dizziness    Of note, pt was d/c from ED the previous day after presenting with diarrhea and rash on R buttocks dx'd with shingles and UTI  Pt now dx'd with toxic metabolic encephalopathy likely 2* Valtrex toxicity in setting of CKD5  Additional active problems include: herpes zoster, metabolic acidosis, diarrhea of presumed infections origin, and chronic saddle PE  Pt with active OT orders and cleared by RN for OT evaluation and OOB mobility  Pt resides with her high functioning autistic son [facility] with 6-7 MARLON and full flight to bed/bath  Pt reports being I with ADLs, IADLs, and functional mobility without use of DME PTA  Pt is a   Pt is currently demonstrating the following occupational deficits: eating with set-up, grooming with set-up, UB bathing with supervision, LB bathing with Nathan, UB dressing with Nathan, LB dressing with Nathan, toileting with supervision, functional transfers with Nathan and functional mobility with Nathan  Factors currently limiting pt's independence in these areas include: generalized weakness, endurance, activity tolerance, balance, functional mobility, (?) cognitive deficits, and limited support at home  From an OT standpoint, recommend home OT upon d/c  Pt is to continue to benefit from skilled occupational therapy services while in the hospital to maximize functioning and independence with daily activities  See below for OT goals to be addressed 3-5x/wk  Goals   Patient Goals to go home   Plan   Treatment Interventions ADL retraining;Functional transfer training;UE strengthening/ROM; Endurance training;Cognitive reorientation;Patient/family training;Equipment evaluation/education; Compensatory technique education;Continued evaluation; Activityengagement   Goal Expiration Date 07/28/20   OT Treatment Day 1   OT Frequency 3-5x/wk   Recommendation   OT Discharge Recommendation Home with skilled therapy  (home OT)   Equipment Recommended   (use of builtin shower seat)     Goals:    Pt will be attentive 100% of the time during ongoing cognitive assessment w/ G participation to assist w/ safe d/c planning/recommendations  Pt will improve activity tolerance to G for min 45 min txment sessions for increase engagement in functional tasks  Pt will complete all self care tasks w/ Kay w/ use of DME/AD as appropriate  Pt will improve functional transfers to Mod I on/off all surfaces using DME as needed w/ G balance/safety     Pt will improve functional mobility during ADL/IADL/leisure tasks to Mod I using DME as needed w/ G balance/safety     Pt will participate in simulated IADL management task to increase independence to Mod I w/ G safety and endurance    Pt will demonstrate G carryover of pt/caregiver education and training as appropriate w/o cues w/ good tolerance to increase safety during functional tasks    Pt will maintain standing upright I'ly for at least 20 minutes while completing a dynamic functional activity with good balance and endurance      Mahin Shafer, MOT, OTR/L

## 2020-07-19 VITALS
OXYGEN SATURATION: 96 % | SYSTOLIC BLOOD PRESSURE: 159 MMHG | BODY MASS INDEX: 39.46 KG/M2 | HEART RATE: 78 BPM | RESPIRATION RATE: 16 BRPM | HEIGHT: 60 IN | WEIGHT: 201 LBS | TEMPERATURE: 99.1 F | DIASTOLIC BLOOD PRESSURE: 82 MMHG

## 2020-07-19 PROBLEM — G92.8 TOXIC METABOLIC ENCEPHALOPATHY: Status: RESOLVED | Noted: 2020-07-15 | Resolved: 2020-07-19

## 2020-07-19 PROBLEM — E87.20 ACIDOSIS, METABOLIC: Status: RESOLVED | Noted: 2017-01-23 | Resolved: 2020-07-19

## 2020-07-19 PROBLEM — E87.2 ACIDOSIS, METABOLIC: Status: RESOLVED | Noted: 2017-01-23 | Resolved: 2020-07-19

## 2020-07-19 PROBLEM — E87.6 HYPOKALEMIA: Status: RESOLVED | Noted: 2019-01-11 | Resolved: 2020-07-19

## 2020-07-19 LAB
ANION GAP SERPL CALCULATED.3IONS-SCNC: 3 MMOL/L (ref 4–13)
BASOPHILS # BLD AUTO: 0.03 THOUSANDS/ΜL (ref 0–0.1)
BASOPHILS NFR BLD AUTO: 1 % (ref 0–1)
BUN SERPL-MCNC: 27 MG/DL (ref 5–25)
CALCIUM SERPL-MCNC: 7.3 MG/DL (ref 8.3–10.1)
CHLORIDE SERPL-SCNC: 107 MMOL/L (ref 100–108)
CO2 SERPL-SCNC: 31 MMOL/L (ref 21–32)
CREAT SERPL-MCNC: 2.81 MG/DL (ref 0.6–1.3)
EOSINOPHIL # BLD AUTO: 0.06 THOUSAND/ΜL (ref 0–0.61)
EOSINOPHIL NFR BLD AUTO: 2 % (ref 0–6)
ERYTHROCYTE [DISTWIDTH] IN BLOOD BY AUTOMATED COUNT: 14.5 % (ref 11.6–15.1)
GFR SERPL CREATININE-BSD FRML MDRD: 16 ML/MIN/1.73SQ M
GLUCOSE SERPL-MCNC: 89 MG/DL (ref 65–140)
HCT VFR BLD AUTO: 28.1 % (ref 34.8–46.1)
HGB BLD-MCNC: 9.3 G/DL (ref 11.5–15.4)
IMM GRANULOCYTES # BLD AUTO: 0.03 THOUSAND/UL (ref 0–0.2)
IMM GRANULOCYTES NFR BLD AUTO: 1 % (ref 0–2)
LYMPHOCYTES # BLD AUTO: 0.88 THOUSANDS/ΜL (ref 0.6–4.47)
LYMPHOCYTES NFR BLD AUTO: 24 % (ref 14–44)
MCH RBC QN AUTO: 31.1 PG (ref 26.8–34.3)
MCHC RBC AUTO-ENTMCNC: 33.1 G/DL (ref 31.4–37.4)
MCV RBC AUTO: 94 FL (ref 82–98)
MONOCYTES # BLD AUTO: 0.31 THOUSAND/ΜL (ref 0.17–1.22)
MONOCYTES NFR BLD AUTO: 8 % (ref 4–12)
NEUTROPHILS # BLD AUTO: 2.42 THOUSANDS/ΜL (ref 1.85–7.62)
NEUTS SEG NFR BLD AUTO: 64 % (ref 43–75)
NRBC BLD AUTO-RTO: 0 /100 WBCS
PLATELET # BLD AUTO: 227 THOUSANDS/UL (ref 149–390)
PMV BLD AUTO: 9.8 FL (ref 8.9–12.7)
POTASSIUM SERPL-SCNC: 3.9 MMOL/L (ref 3.5–5.3)
RBC # BLD AUTO: 2.99 MILLION/UL (ref 3.81–5.12)
SODIUM SERPL-SCNC: 141 MMOL/L (ref 136–145)
WBC # BLD AUTO: 3.73 THOUSAND/UL (ref 4.31–10.16)

## 2020-07-19 PROCEDURE — 99232 SBSQ HOSP IP/OBS MODERATE 35: CPT | Performed by: INTERNAL MEDICINE

## 2020-07-19 PROCEDURE — 80048 BASIC METABOLIC PNL TOTAL CA: CPT | Performed by: GENERAL PRACTICE

## 2020-07-19 PROCEDURE — 85025 COMPLETE CBC W/AUTO DIFF WBC: CPT | Performed by: GENERAL PRACTICE

## 2020-07-19 PROCEDURE — 99239 HOSP IP/OBS DSCHRG MGMT >30: CPT | Performed by: GENERAL PRACTICE

## 2020-07-19 RX ORDER — LANOLIN ALCOHOL/MO/W.PET/CERES
3 CREAM (GRAM) TOPICAL
Qty: 30 TABLET | Refills: 0 | Status: SHIPPED | OUTPATIENT
Start: 2020-07-19

## 2020-07-19 RX ORDER — VANCOMYCIN HYDROCHLORIDE 125 MG/1
125 CAPSULE ORAL 2 TIMES DAILY
Qty: 7 CAPSULE | Refills: 0 | Status: SHIPPED | OUTPATIENT
Start: 2020-07-19 | End: 2020-07-23

## 2020-07-19 RX ORDER — NIFEDIPINE 30 MG/1
30 TABLET, FILM COATED, EXTENDED RELEASE ORAL DAILY
Qty: 30 TABLET | Refills: 0 | Status: SHIPPED | OUTPATIENT
Start: 2020-07-20 | End: 2020-10-23 | Stop reason: ALTCHOICE

## 2020-07-19 RX ORDER — CEPHALEXIN 250 MG/1
250 CAPSULE ORAL EVERY 8 HOURS SCHEDULED
Qty: 5 CAPSULE | Refills: 0 | Status: SHIPPED | OUTPATIENT
Start: 2020-07-19 | End: 2020-07-21

## 2020-07-19 RX ADMIN — CITALOPRAM HYDROBROMIDE 20 MG: 20 TABLET ORAL at 09:16

## 2020-07-19 RX ADMIN — RUXOLITINIB 10 MG: 10 TABLET ORAL at 09:17

## 2020-07-19 RX ADMIN — SODIUM BICARBONATE 650 MG TABLET 650 MG: at 09:16

## 2020-07-19 RX ADMIN — Medication 125 MG: at 10:57

## 2020-07-19 RX ADMIN — METOPROLOL TARTRATE 25 MG: 25 TABLET, FILM COATED ORAL at 09:16

## 2020-07-19 RX ADMIN — APIXABAN 2.5 MG: 2.5 TABLET, FILM COATED ORAL at 09:16

## 2020-07-19 RX ADMIN — MELATONIN 1000 UNITS: at 09:16

## 2020-07-19 RX ADMIN — NIFEDIPINE 30 MG: 30 TABLET, FILM COATED, EXTENDED RELEASE ORAL at 09:17

## 2020-07-19 RX ADMIN — LEVOTHYROXINE SODIUM 75 MCG: 75 TABLET ORAL at 05:27

## 2020-07-19 RX ADMIN — Medication 250 MG: at 09:16

## 2020-07-19 RX ADMIN — CEFAZOLIN 500 MG: 1 INJECTION, POWDER, FOR SOLUTION INTRAMUSCULAR; INTRAVENOUS at 05:28

## 2020-07-19 NOTE — ASSESSMENT & PLAN NOTE
Likely 2/2 Valtrex toxicity in setting of CKD4-5  Stop Valtrex  Denies any hallucinations for 24 hours

## 2020-07-19 NOTE — PLAN OF CARE
Problem: Potential for Falls  Goal: Patient will remain free of falls  Description  INTERVENTIONS:  - Assess patient frequently for physical needs  -  Identify cognitive and physical deficits and behaviors that affect risk of falls    -  [place] fall precautions as indicated by assessment   - Educate patient/family on patient safety including physical limitations  - Instruct patient to call for assistance with activity based on assessment  - Modify environment to reduce risk of injury  - Consider OT/PT consult to assist with strengthening/mobility  Outcome: Adequate for Discharge

## 2020-07-19 NOTE — ASSESSMENT & PLAN NOTE
Estimated Creatinine Clearance: 17 7 mL/min (A) (by C-G formula based on SCr of 2 81 mg/dL (H))    CKD4-5  Cr appears to be at baseline high 2s to low 3s  Sees Dr Evelyn Blake outpt  Renal appreciated

## 2020-07-19 NOTE — ASSESSMENT & PLAN NOTE
2/2 bicarb gtt  7/18 w/ K of 2 9 give KCl 40 mEq IV and Kdur 40 mEq PO  No events on tele  Bicarb gtt d/c - no need for bicarb tabs at d/c as bicarb > 30  Will need BMP 1-2 weeks

## 2020-07-19 NOTE — DISCHARGE SUMMARY
Discharge- Haresh De Paz [birth year], 77 y o  female MRN: [id number]    Unit/Bed#: [address] 722-01 Encounter: [id number]    Primary Care Provider: Birgit Richmond MD   Date and time admitted to hospital: 7/15/2020  6:41 AM        * Toxic metabolic encephalopathyresolved as of 7/19/2020  Assessment & Plan  Likely 2/2 Valtrex toxicity in setting of CKD4-5  Stop Valtrex  Denies any hallucinations for 24 hours      Polycythemia  Assessment & Plan  C/w Jakafi    Inverted T wave  Assessment & Plan  No CP and neg trop  No need for further w/u at this time    Herpes zoster  Assessment & Plan  In anal region  Pt took Valtrex 1 Gm x 2 8 hours apart  In setting of hallucinations stop Valtrex  Pt asymptomatic from this at this time so hold off on antivirals      UTI (urinary tract infection)  Assessment & Plan  Pt has ileal conduit  Stop Bactrim in setting of CKD4-5 - took dose 7/14  Rocephin de-escalated to Ancef  Vanco prophylaxis  Cx > 100,000 E coli pansensitive  Procal was mildly elevated in pt with CKD now WNL  will d/c on Keflex - will need abx through 7/20    Stage 5 chronic kidney disease not on chronic dialysis [facility])  Assessment & Plan  Estimated Creatinine Clearance: 17 7 mL/min (A) (by C-G formula based on SCr of 2 81 mg/dL (H))    CKD4-5  Cr appears to be at baseline high 2s to low 3s  Sees Dr Leah Cao outpt  Renal appreciated    Diarrhea  Assessment & Plan  POA  C diff neg  Vanco prophylaxis in pt w/ hx C diff on abx needed through 7/22   Immodium prn    Hypertension  Assessment & Plan  C/w BB  Procardia added as BP elevated  BP acceptable today    Chronic saddle pulmonary embolism (HCC)  Assessment & Plan  C/w Eliquis low dose (in setting of CKD4-5)    Acidosis, metabolicresolved as of 4/57/0745  Assessment & Plan  2/2 CKD  Renal consult appreciated  Resolved w/ bicarb gtt    Hypokalemiaresolved as of 7/19/2020  Assessment & Plan  2/2 bicarb gtt  7/18 w/ K of 2 9 give KCl 40 mEq IV and Kdur 40 mEq PO  No events on tele  Bicarb gtt d/c - no need for bicarb tabs at d/c as bicarb > 30  Will need BMP 1-2 weeks      Discharging Physician / Practitioner: Brunilda Dutta DO  PCP: Luisa Tanner MD  Admission Date:   Admission Orders (From admission, onward)     Ordered        07/15/20 0927  Inpatient Admission  Once                   Discharge Date: 07/19/20    Resolved Problems  Date Reviewed: 7/19/2020          Resolved    Hypokalemia 7/19/2020     Resolved by  Brunilda Dutta DO    Acidosis, metabolic 5/75/2461     Resolved by  Brunilda Dutta DO    * (Principal) Toxic metabolic encephalopathy 0/85/3104     Resolved by  Brunilda Dutta DO          Consultations During Hospital Stay:  · renal    Procedures Performed:   · IR placed PICC that was removed before d/c    Significant Findings / Test Results:   · See above    Incidental Findings:   · none     Test Results Pending at Discharge (will require follow up):   · none     Outpatient Tests Requested:  · BMP in 1-2 weeks    Complications:  none    Reason for Admission: encephalopathy    Hospital Course:     Justino Tenorio is a 77 y o  female patient who originally presented to the hospital on 7/15/2020 due to encephalopathy 2/2 metabolic acidosis and Valtrex toxicity  Stopped Valtrex and gave bicarb gtt  Felt better at d/c  Rec home PT but pt refused  Please see above list of diagnoses and related plan for additional information  Condition at Discharge: stable     Discharge Day Visit / Exam:     Subjective:  No acute complaints  Vitals: Blood Pressure: 164/84 (07/19/20 1430)  Pulse: 78 (07/19/20 0811)  Temperature: 99 1 °F (37 3 °C) (07/19/20 1430)  Temp Source: Oral (07/19/20 0811)  Respirations: 16 (07/19/20 0811)  Height: 5' (152 4 cm) (07/15/20 0915)  Weight - Scale: 91 2 kg (201 lb) (07/15/20 0646)  SpO2: 96 % (07/19/20 0811)  Exam:   Physical Exam   Constitutional: She is oriented to person, place, and time  No distress     HENT:   Head: Normocephalic and atraumatic  Eyes: Conjunctivae and EOM are normal    Neck: Normal range of motion  Neck supple  Cardiovascular: Normal rate and regular rhythm  Pulmonary/Chest: Effort normal and breath sounds normal  She has no wheezes  She has no rales  Abdominal: Soft  Bowel sounds are normal  She exhibits no distension  There is no tenderness  Musculoskeletal: Normal range of motion  She exhibits no edema  Neurological: She is alert and oriented to person, place, and time  Skin: Skin is warm and dry  She is not diaphoretic  Discussion with Family: yes    Discharge instructions/Information to patient and family:   See after visit summary for information provided to patient and family  Provisions for Follow-Up Care:  See after visit summary for information related to follow-up care and any pertinent home health orders  Disposition:     Home    For Discharges to [facility] SNF:   · Not Applicable to this Patient - Not Applicable to this Patient    Planned Readmission: no     Discharge Statement:  I spent 35 minutes discharging the patient  This time was spent on the day of discharge  I had direct contact with the patient on the day of discharge  Greater than 50% of the total time was spent examining patient, answering all patient questions, arranging and discussing plan of care with patient as well as directly providing post-discharge instructions  Additional time then spent on discharge activities  Discharge Medications:  See after visit summary for reconciled discharge medications provided to patient and family        ** Please Note: This note has been constructed using a voice recognition system **

## 2020-07-19 NOTE — DISCHARGE INSTRUCTIONS
PCP or nephrology should check BMP in 1-2 weeks         Peripherally Inserted Central Catheter     WHAT YOU NEED TO KNOW:   A PICC is an IV placed into a large blood vessel near your heart  It is usually inserted through a blood vessel in your arm  Your PICC may have multiple ports  Ports are tubes where you can inject medicine  A PICC can stay in place for several weeks or months  You may need a PICC to get nutrition, medicine, or fluids  Blood samples can be removed from your PICC and sent to the lab for tests  DISCHARGE INSTRUCTIONS:    [address] and Rubi Monsalve patients,    Contact Interventional Radiology at [phone] PATIENTS: Contact Interventional Radiology at [phone]   [facility] PATIENTS: Contact Interventional Radiology at [phone] if:  · Blood soaks through your bandage  · Your arm or leg feels warm, tender, and painful  It may look swollen and red  · You have trouble moving your arm  · Your catheter falls out  · You have a fever or swelling, redness, pain, or pus where the catheter was inserted  · Persistent nausea or vomiting  · You cannot flush your catheter, or you feel pain when you flush your catheter  · You see a hole or crack in the tubing of your catheter  · You see fluid leaking from the insertion site  · You run out of supplies to care for your catheter  · You have questions or concerns about your condition or care  Chronic Kidney Disease   WHAT YOU NEED TO KNOW:   Chronic kidney disease (CKD) is the gradual and permanent loss of kidney function  It is also called chronic kidney failure, or chronic renal insufficiency  Normally, the kidneys remove fluid, chemicals, and waste from your blood  These wastes are turned into urine by your kidneys  CKD may worsen over time and lead to kidney failure  DISCHARGE INSTRUCTIONS:   Seek care immediately if:   · You are confused and very drowsy  · You have a seizure      · You have shortness of breath  Contact your healthcare provider if:   · You suddenly gain or lose more weight than your healthcare provider has told you is okay  · You have itchy skin or a rash  · You urinate more or less than you normally do  · You have blood in your urine  · You have nausea and repeated vomiting  · You have fatigue or muscle weakness  · You have hiccups that will not stop  · You have questions or concerns about your condition or care  Medicines:   · Medicines  may be given to decrease blood pressure and get rid of extra fluid  You may also receive medicine to manage health conditions that may occur with CKD, such as anemia, diabetes, and heart disease  · Take your medicine as directed  Contact your healthcare provider if you think your medicine is not helping or if you have side effects  Tell him or her if you are allergic to any medicine  Keep a list of the medicines, vitamins, and herbs you take  Include the amounts, and when and why you take them  Bring the list or the pill bottles to follow-up visits  Carry your medicine list with you in case of an emergency  Follow up with your healthcare provider as directed: You will need to return for tests to monitor your kidney function  You may also be referred to a kidney specialist  Write down your questions so you remember to ask them during your visits  Manage other health conditions: Follow your healthcare provider's directions on how to manage diabetes, high blood pressure, and heart disease  These conditions can make CKD worse  Talk to your healthcare provider before you take over-the-counter medicine  Medicines such as NSAIDs, stomach medicine, or laxatives may harm your kidneys  Weigh yourself daily:  Ask your healthcare provider what your weight should be  Ask how much liquid you should drink each day  CKD may cause you to gain or lose weight rapidly  Weigh yourself every day   Write down your weight, how much liquid you drink or eat, and how much you urinate each day  Contact your healthcare provider if your weight is higher or lower than it should be  Manage CKD:   · Maintain a healthy weight  Ask your healthcare provider how much you should weigh  Ask him to help you create a weight loss plan if you are overweight  · Exercise 30 to 60 minutes a day, 4 to 7 times a week, or as directed  Ask about the best exercise plan for you  Regular exercise can help you manage CKD, high blood pressure, and diabetes  · Follow your healthcare provider's advice about what to eat and drink  He may tell you to eat food low in sodium (salt), potassium, phosphorus, or protein  You may need to see a dietitian if you need help planning meals  Ask how much liquid to drink each day and which liquids are best for you  · Limit alcohol  Ask how much alcohol is safe for you to drink  A drink of alcohol is 12 ounces of beer, 5 ounces of wine, or 1½ ounces of liquor  · Do not smoke  Nicotine and other chemicals in cigarettes and cigars can cause lung and kidney damage  Ask your healthcare provider for information if you currently smoke and need help to quit  E-cigarettes or smokeless tobacco still contain nicotine  Talk to your healthcare provider before you use these products  · Ask your healthcare provider if you need vaccines  Infections such as pneumonia, influenza, and hepatitis can be more harmful or more likely to occur in a person who has CKD  Vaccines reduce your risk of infection with these viruses  © 2017 [facility] Information is for End User's use only and may not be sold, redistributed or otherwise used for commercial purposes  All illustrations and images included in CareNotes® are the copyrighted property of [employer]  or Venu Delgado  The above information is an  only  It is not intended as medical advice for individual conditions or treatments   Talk to your doctor, nurse or pharmacist before following any medical regimen to see if it is safe and effective for you

## 2020-07-19 NOTE — ASSESSMENT & PLAN NOTE
Pt has ileal conduit  Stop Bactrim in setting of CKD4-5 - took dose 7/14  Rocephin de-escalated to Ancef  Vanco prophylaxis  Cx > 100,000 E coli pansensitive  Procal was mildly elevated in pt with CKD now WNL  will d/c on Keflex - will need abx through 7/20

## 2020-07-19 NOTE — PROGRESS NOTES
NEPHROLOGY PROGRESS NOTE   Ralph Drake 77 y o  female MRN: [id number]  Unit/Bed#: [facility] 722-01 Encounter: [id number]  Reason for Consult: LUANN/CKD    ASSESSMENT AND PLAN:  Patient is 60-year-old female significant medical issues of CKD stage 4 to five, history of nonfunctional bladder, prior history of bilateral hydronephrosis, status post cystectomy with ileal conduit in 2016, polycythemia vera, history of PE, presented with diarrhea and rash   We are consulted for CKD management      CKD stage 4 to 5, baseline creatinine high 2s to low 3s, follows with Dr Louie Cook  -creatinine 3 2 now improving to 2 8 today   -now remains off IV fluid  -BMP in a m   -continue to avoid Valtrex, Bactrim (she admits to be taking both on 7/14/20)  -CKD suspected to be secondary to obstructive uropathy, likely functional solitary right kidney  -CT scan of abdomen does not report hydronephrosis, shows atrophic left kidney      Low bicarb, presume hyperchloremic non-anion gap metabolic acidosis in the setting of ileal conduit, advanced renal failure  -serum bicarb now has overall much improved   -hold bicarb supplement for now     Hypokalemia, resolved with replacement      Anemia in CKD, hemoglobin overall stable   Continue to closely monitor   Transfuse p r n  For hemoglobin less than seven      Nonfunctional bladder history, status post cystectomy with ileal conduit in 2016  Follows with Urology as an outpatient      Secondary hyperparathyroidism, on calcitriol  Hypophosphatemia, serum phosphorus improving with phosphorus supplement      Hypertension, BP  seems to be overall better with occasional high readings    -currently on metoprolol 25 mg b i d ,  Started patient on nifedipine XL 30 mg daily with holding parameter  - if blood pressure remains greater than 150/90 persistently, consider changing metoprolol to carvedilol  25 mg b i d      Suspected UTI, UA which is ileal conduit specimen shows 3+ proteinuria, concentrated sample, innumerable RBCs/WBCs   -urine culture shows GNR  -currently on antibiotic as per primary team     Altered mental status, this is now much improved and resolved  -?  Metabolic encephalopathy in the setting of suspected UTI, use of Valtrex     Herpes zoster, management as per primary team   Diarrhea, C diff  negative     Discussed above plan in detail with primary team   Overall stable from renal standpoint for discharge  SUBJECTIVE:  Patient seen and examined at bedside   No chest pain, shortness of breath, nausea, vomiting, abdominal pain    OBJECTIVE:  Current Weight: Weight - Scale: 91 2 kg (201 lb)  Vitals:    07/19/20 0811   BP: 156/89   Pulse: 78   Resp: 16   Temp: 98 9 °F (37 2 °C)   SpO2: 96%       Intake/Output Summary (Last 24 hours) at 7/19/2020 1015  Last data filed at 7/19/2020 0915  Gross per 24 hour   Intake 1020 ml   Output 600 ml   Net 420 ml     Wt Readings from Last 3 Encounters:   07/15/20 91 2 kg (201 lb)   06/24/20 91 2 kg (201 lb)   01/13/20 89 8 kg (198 lb)     Temp Readings from Last 3 Encounters:   07/19/20 98 9 °F (37 2 °C) (Oral)   07/14/20 97 7 °F (36 5 °C) (Oral)   06/24/20 98 2 °F (36 8 °C) (Oral)     BP Readings from Last 3 Encounters:   07/19/20 156/89   07/14/20 (!) 179/92   06/24/20 126/82     Pulse Readings from Last 3 Encounters:   07/19/20 78   07/14/20 58   06/24/20 72        Physical Examination:  General:  Sitting in chair, no acute distress   Eyes:  Mild conjunctival pallor present  ENT:  External examination of ears and nose unremarkable  Neck:  No obvious lymphadenopathy appreciated  Respiratory:  Bilateral air entry present  CVS:  S1, S2 present  GI:  Soft, nontender, nondistended  CNS:  Active alert oriented x3  Skin:  No new rash in legs  Musculoskeletal:  No obvious gross deformity noted    Medications:    Current Facility-Administered Medications:     acetaminophen (TYLENOL) tablet 650 mg, 650 mg, Oral, Q6H PRN, Brigitte Larry DO    apixaban Waqar Burris) tablet 2 5 mg, 2 5 mg, Oral, BID, Rendell Gregory, DO, 2 5 mg at 07/19/20 1258    calcitriol (ROCALTROL) capsule 0 25 mcg, 0 25 mcg, Oral, Every Other Day, Rendell Gregory, DO, 0 25 mcg at 07/18/20 0820    ceFAZolin (ANCEF) 500 mg in sodium chloride 0 9 % 50 mL IVPB, 500 mg, Intravenous, Q12H, Rendell Gregory, DO, Stopped at 07/19/20 0650    cholecalciferol (VITAMIN D3) tablet 1,000 Units, 1,000 Units, Oral, Daily, Rendell Gregory, DO, 1,000 Units at 07/19/20 0916    citalopram (CeleXA) tablet 20 mg, 20 mg, Oral, Daily, Rendell Gregory, DO, 20 mg at 07/19/20 6244    levothyroxine tablet 75 mcg, 75 mcg, Oral, Early Morning, Rendell Gregory, DO, 75 mcg at 07/19/20 2817    loperamide (IMODIUM) capsule 2 mg, 2 mg, Oral, 4x Daily PRN, Rendell Gregory, DO    melatonin tablet 3 mg, 3 mg, Oral, HS, Rendell Gregory, DO, 3 mg at 07/18/20 2126    metoprolol tartrate (LOPRESSOR) tablet 25 mg, 25 mg, Oral, BID, Rendell Gregory, DO, 25 mg at 07/19/20 0916    NIFEdipine (PROCARDIA XL) 24 hr tablet 30 mg, 30 mg, Oral, Daily, Roberto Grant MD, 30 mg at 07/19/20 7928    ruxolitinib (JAKAFI) tablet 10 mg, 10 mg, Oral, Daily, Rendell Gregory, DO, 10 mg at 07/19/20 2467    saccharomyces boulardii (FLORASTOR) capsule 250 mg, 250 mg, Oral, Daily, Rendell Gregory, DO, 250 mg at 07/19/20 0916    sodium bicarbonate tablet 650 mg, 650 mg, Oral, BID after meals, Roberto Grant MD, 650 mg at 07/19/20 0916    vancomycin (VANCOCIN) oral solution 125 mg, 125 mg, Oral, Q12H [address], Rendell Gregory, DO, 125 mg at 07/18/20 2126    Laboratory Results:  Results from last 7 days   Lab Units 07/19/20  0528 07/18/20  0603 07/17/20  1516 07/16/20  0519 07/15/20  0716 07/14/20  1333 07/14/20  1129   WBC Thousand/uL 3 73* 3 01* 4 48 3 34* 3 91*  --  4 07*   HEMOGLOBIN g/dL 9 3* 9 1* 10 5* 10 3* 10 5*  --  12 1   HEMATOCRIT % 28 1* 27 8* 31 4* 31 0* 31 1*  --  36 6   PLATELETS Thousands/uL 227 202 282 199 205  --  249   SODIUM mmol/L 141 142 144 136 137 137 135* POTASSIUM mmol/L 3 9 2 9* 3 2* 4 0 4 3 4 3 4 0   CHLORIDE mmol/L 107 106 111* 109* 113* 113* 109*   CO2 mmol/L 31 27 25 18* 16* 17* 16*   BUN mg/dL 27* 25 29* 32* 40* 37* 38*   CREATININE mg/dL 2 81* 2 90* 3 17* 3 26* 3 37* 3 11* 3 45*   CALCIUM mg/dL 7 3* 7 0* 7 9* 8 0* 8 3 8 5 8 8   MAGNESIUM mg/dL  --   --   --  2 0  --   --   --    PHOSPHORUS mg/dL  --   --  3 7 2 0*  --   --   --        IR central line placement   Final Result by Lisha Byrd MD (07/17 1973)   Impression:   Ultrasound and fluoroscopically guided placement of a 5-Chilean double-lumen power injectable central venous catheter via the right external jugular vein      Catheter is ready for immediate use  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

## 2020-07-20 NOTE — UTILIZATION REVIEW
Notification of Discharge  This is a Notification of Discharge from our facility [address]  Please be advised that this patient has been discharge from our facility  Below you will find the admission and discharge date and time including the patients disposition  PRESENTATION DATE: 7/15/2020  6:41 AM  OBS ADMISSION DATE:   IP ADMISSION DATE: 7/15/20 0927   DISCHARGE DATE: 7/19/2020  1:15 PM  DISPOSITION: Home/Self Care Home/Self Care   Admission Orders listed below:  Admission Orders (From admission, onward)     Ordered        07/15/20 0927  Inpatient Admission  Once                   Please contact the UR Department if additional information is required to close this patient's authorization/case  [address] Utilization Review Department  Main: [phone] x carefully listen to the prompts  All voicemails are confidential   [email or web address]  Send all requests for admission clinical reviews, approved or denied determinations and any other requests to dedicated fax number below belonging to the campus where the patient is receiving treatment   List of dedicated fax numbers:  [address] DENIALS (Administrative/Medical Necessity) [phone]   [address] (Maternity/NICU/Pediatrics) [phone]   Shonna Aquino [phone]   Cisneros Delay [phone]   [facility] [phone]   Genia Rizo [address] [phone]   [facility]  [phone]   [address] [facility] [address]  [address] [address] [address] [facility] [phone]

## 2020-08-09 ENCOUNTER — HOSPITAL ENCOUNTER (INPATIENT)
Facility: HOSPITAL | Age: 75
LOS: 14 days | Discharge: NURSING, REHAB OR LONG-TERM CARE | DRG: 354 | End: 2020-08-24
Attending: EMERGENCY MEDICINE | Admitting: SURGERY
Payer: COMMERCIAL

## 2020-08-09 ENCOUNTER — APPOINTMENT (EMERGENCY)
Dept: RADIOLOGY | Facility: HOSPITAL | Age: 75
DRG: 354 | End: 2020-08-09
Payer: COMMERCIAL

## 2020-08-09 ENCOUNTER — ANESTHESIA (EMERGENCY)
Dept: PERIOP | Facility: HOSPITAL | Age: 75
DRG: 354 | End: 2020-08-09
Payer: COMMERCIAL

## 2020-08-09 ENCOUNTER — ANESTHESIA EVENT (EMERGENCY)
Dept: PERIOP | Facility: HOSPITAL | Age: 75
DRG: 354 | End: 2020-08-09
Payer: COMMERCIAL

## 2020-08-09 DIAGNOSIS — B02.9 SHINGLES RASH: ICD-10-CM

## 2020-08-09 DIAGNOSIS — N17.9 ACUTE KIDNEY INJURY SUPERIMPOSED ON CHRONIC KIDNEY DISEASE (HCC): ICD-10-CM

## 2020-08-09 DIAGNOSIS — I27.82 CHRONIC SADDLE PULMONARY EMBOLISM WITHOUT ACUTE COR PULMONALE (HCC): Chronic | ICD-10-CM

## 2020-08-09 DIAGNOSIS — R10.9 ABDOMINAL PAIN: ICD-10-CM

## 2020-08-09 DIAGNOSIS — R11.2 NAUSEA AND VOMITING, INTRACTABILITY OF VOMITING NOT SPECIFIED, UNSPECIFIED VOMITING TYPE: ICD-10-CM

## 2020-08-09 DIAGNOSIS — I26.92 CHRONIC SADDLE PULMONARY EMBOLISM WITHOUT ACUTE COR PULMONALE (HCC): Chronic | ICD-10-CM

## 2020-08-09 DIAGNOSIS — N99.523 HERNIA OF URETEROILEAL CONDUIT STOMA (HCC): ICD-10-CM

## 2020-08-09 DIAGNOSIS — D45 POLYCYTHEMIA VERA (HCC): ICD-10-CM

## 2020-08-09 DIAGNOSIS — D72.825 BANDEMIA: ICD-10-CM

## 2020-08-09 DIAGNOSIS — K56.609 SMALL BOWEL OBSTRUCTION (HCC): Primary | ICD-10-CM

## 2020-08-09 DIAGNOSIS — N18.9 ACUTE KIDNEY INJURY SUPERIMPOSED ON CHRONIC KIDNEY DISEASE (HCC): ICD-10-CM

## 2020-08-09 LAB
ABO GROUP BLD: NORMAL
ALBUMIN SERPL BCP-MCNC: 3.6 G/DL (ref 3.5–5)
ALP SERPL-CCNC: 67 U/L (ref 46–116)
ALT SERPL W P-5'-P-CCNC: 10 U/L (ref 12–78)
ANION GAP SERPL CALCULATED.3IONS-SCNC: 11 MMOL/L (ref 4–13)
ANISOCYTOSIS BLD QL SMEAR: PRESENT
AST SERPL W P-5'-P-CCNC: 16 U/L (ref 5–45)
BASOPHILS # BLD MANUAL: 0 THOUSAND/UL (ref 0–0.1)
BASOPHILS NFR MAR MANUAL: 0 % (ref 0–1)
BILIRUB SERPL-MCNC: 0.75 MG/DL (ref 0.2–1)
BLD GP AB SCN SERPL QL: NEGATIVE
BUN SERPL-MCNC: 58 MG/DL (ref 5–25)
CALCIUM SERPL-MCNC: 9.2 MG/DL (ref 8.3–10.1)
CHLORIDE SERPL-SCNC: 104 MMOL/L (ref 100–108)
CO2 SERPL-SCNC: 20 MMOL/L (ref 21–32)
CREAT SERPL-MCNC: 5.17 MG/DL (ref 0.6–1.3)
EOSINOPHIL # BLD MANUAL: 0 THOUSAND/UL (ref 0–0.4)
EOSINOPHIL NFR BLD MANUAL: 0 % (ref 0–6)
ERYTHROCYTE [DISTWIDTH] IN BLOOD BY AUTOMATED COUNT: 14.4 % (ref 11.6–15.1)
GFR SERPL CREATININE-BSD FRML MDRD: 8 ML/MIN/1.73SQ M
GLUCOSE SERPL-MCNC: 150 MG/DL (ref 65–140)
HCT VFR BLD AUTO: 36.3 % (ref 34.8–46.1)
HGB BLD-MCNC: 12.3 G/DL (ref 11.5–15.4)
INR PPP: 1.41 (ref 0.84–1.19)
LACTATE SERPL-SCNC: 1.5 MMOL/L (ref 0.5–2)
LIPASE SERPL-CCNC: 205 U/L (ref 73–393)
LYMPHOCYTES # BLD AUTO: 0.11 THOUSAND/UL (ref 0.6–4.47)
LYMPHOCYTES # BLD AUTO: 1 % (ref 14–44)
MCH RBC QN AUTO: 30.8 PG (ref 26.8–34.3)
MCHC RBC AUTO-ENTMCNC: 33.9 G/DL (ref 31.4–37.4)
MCV RBC AUTO: 91 FL (ref 82–98)
METAMYELOCYTES NFR BLD MANUAL: 2 % (ref 0–1)
MONOCYTES # BLD AUTO: 0.34 THOUSAND/UL (ref 0–1.22)
MONOCYTES NFR BLD: 3 % (ref 4–12)
NEUTROPHILS # BLD MANUAL: 10.54 THOUSAND/UL (ref 1.85–7.62)
NEUTS BAND NFR BLD MANUAL: 10 % (ref 0–8)
NEUTS SEG NFR BLD AUTO: 84 % (ref 43–75)
NRBC BLD AUTO-RTO: 0 /100 WBCS
PLATELET # BLD AUTO: 337 THOUSANDS/UL (ref 149–390)
PLATELET BLD QL SMEAR: ADEQUATE
PMV BLD AUTO: 9.7 FL (ref 8.9–12.7)
POTASSIUM SERPL-SCNC: 4.7 MMOL/L (ref 3.5–5.3)
PROT SERPL-MCNC: 7.8 G/DL (ref 6.4–8.2)
PROTHROMBIN TIME: 17.2 SECONDS (ref 11.6–14.5)
RBC # BLD AUTO: 4 MILLION/UL (ref 3.81–5.12)
RBC MORPH BLD: PRESENT
RH BLD: POSITIVE
SARS-COV-2 RNA RESP QL NAA+PROBE: NEGATIVE
SODIUM SERPL-SCNC: 135 MMOL/L (ref 136–145)
SPECIMEN EXPIRATION DATE: NORMAL
WBC # BLD AUTO: 11.21 THOUSAND/UL (ref 4.31–10.16)

## 2020-08-09 PROCEDURE — 96375 TX/PRO/DX INJ NEW DRUG ADDON: CPT

## 2020-08-09 PROCEDURE — 86850 RBC ANTIBODY SCREEN: CPT | Performed by: STUDENT IN AN ORGANIZED HEALTH CARE EDUCATION/TRAINING PROGRAM

## 2020-08-09 PROCEDURE — 85007 BL SMEAR W/DIFF WBC COUNT: CPT | Performed by: EMERGENCY MEDICINE

## 2020-08-09 PROCEDURE — 83690 ASSAY OF LIPASE: CPT | Performed by: EMERGENCY MEDICINE

## 2020-08-09 PROCEDURE — 85610 PROTHROMBIN TIME: CPT | Performed by: STUDENT IN AN ORGANIZED HEALTH CARE EDUCATION/TRAINING PROGRAM

## 2020-08-09 PROCEDURE — 86923 COMPATIBILITY TEST ELECTRIC: CPT

## 2020-08-09 PROCEDURE — 99223 1ST HOSP IP/OBS HIGH 75: CPT | Performed by: SURGERY

## 2020-08-09 PROCEDURE — 96365 THER/PROPH/DIAG IV INF INIT: CPT

## 2020-08-09 PROCEDURE — U0003 INFECTIOUS AGENT DETECTION BY NUCLEIC ACID (DNA OR RNA); SEVERE ACUTE RESPIRATORY SYNDROME CORONAVIRUS 2 (SARS-COV-2) (CORONAVIRUS DISEASE [COVID-19]), AMPLIFIED PROBE TECHNIQUE, MAKING USE OF HIGH THROUGHPUT TECHNOLOGIES AS DESCRIBED BY CMS-2020-01-R: HCPCS | Performed by: EMERGENCY MEDICINE

## 2020-08-09 PROCEDURE — 74176 CT ABD & PELVIS W/O CONTRAST: CPT

## 2020-08-09 PROCEDURE — 36415 COLL VENOUS BLD VENIPUNCTURE: CPT

## 2020-08-09 PROCEDURE — 0WUF0JZ SUPPLEMENT ABDOMINAL WALL WITH SYNTHETIC SUBSTITUTE, OPEN APPROACH: ICD-10-PCS | Performed by: SURGERY

## 2020-08-09 PROCEDURE — 99285 EMERGENCY DEPT VISIT HI MDM: CPT | Performed by: EMERGENCY MEDICINE

## 2020-08-09 PROCEDURE — 86901 BLOOD TYPING SEROLOGIC RH(D): CPT | Performed by: STUDENT IN AN ORGANIZED HEALTH CARE EDUCATION/TRAINING PROGRAM

## 2020-08-09 PROCEDURE — G1004 CDSM NDSC: HCPCS

## 2020-08-09 PROCEDURE — 83605 ASSAY OF LACTIC ACID: CPT | Performed by: EMERGENCY MEDICINE

## 2020-08-09 PROCEDURE — 99285 EMERGENCY DEPT VISIT HI MDM: CPT

## 2020-08-09 PROCEDURE — 86900 BLOOD TYPING SEROLOGIC ABO: CPT | Performed by: STUDENT IN AN ORGANIZED HEALTH CARE EDUCATION/TRAINING PROGRAM

## 2020-08-09 PROCEDURE — 85027 COMPLETE CBC AUTOMATED: CPT | Performed by: EMERGENCY MEDICINE

## 2020-08-09 PROCEDURE — 96376 TX/PRO/DX INJ SAME DRUG ADON: CPT

## 2020-08-09 PROCEDURE — 80053 COMPREHEN METABOLIC PANEL: CPT | Performed by: EMERGENCY MEDICINE

## 2020-08-09 PROCEDURE — 96366 THER/PROPH/DIAG IV INF ADDON: CPT

## 2020-08-09 RX ORDER — LIDOCAINE HYDROCHLORIDE 20 MG/ML
15 SOLUTION OROPHARYNGEAL ONCE
Status: COMPLETED | OUTPATIENT
Start: 2020-08-09 | End: 2020-08-09

## 2020-08-09 RX ORDER — ONDANSETRON 2 MG/ML
4 INJECTION INTRAMUSCULAR; INTRAVENOUS ONCE
Status: COMPLETED | OUTPATIENT
Start: 2020-08-09 | End: 2020-08-09

## 2020-08-09 RX ORDER — APIXABAN 2.5 MG/1
TABLET, FILM COATED ORAL
Qty: 60 TABLET | Refills: 5 | Status: SHIPPED | OUTPATIENT
Start: 2020-08-09 | End: 2021-03-29

## 2020-08-09 RX ORDER — FENTANYL CITRATE 50 UG/ML
50 INJECTION, SOLUTION INTRAMUSCULAR; INTRAVENOUS ONCE
Status: COMPLETED | OUTPATIENT
Start: 2020-08-09 | End: 2020-08-09

## 2020-08-09 RX ORDER — OXYMETAZOLINE HYDROCHLORIDE 0.05 G/100ML
2 SPRAY NASAL ONCE
Status: DISCONTINUED | OUTPATIENT
Start: 2020-08-09 | End: 2020-08-09

## 2020-08-09 RX ADMIN — FENTANYL CITRATE 50 MCG: 50 INJECTION INTRAMUSCULAR; INTRAVENOUS at 18:07

## 2020-08-09 RX ADMIN — IOHEXOL 50 ML: 240 INJECTION, SOLUTION INTRATHECAL; INTRAVASCULAR; INTRAVENOUS; ORAL at 18:11

## 2020-08-09 RX ADMIN — LIDOCAINE HYDROCHLORIDE 15 ML: 20 SOLUTION ORAL; TOPICAL at 20:43

## 2020-08-09 RX ADMIN — ONDANSETRON 4 MG: 2 INJECTION INTRAMUSCULAR; INTRAVENOUS at 21:47

## 2020-08-09 RX ADMIN — ONDANSETRON 4 MG: 2 INJECTION INTRAMUSCULAR; INTRAVENOUS at 18:07

## 2020-08-09 RX ADMIN — SODIUM CHLORIDE, SODIUM LACTATE, POTASSIUM CHLORIDE, AND CALCIUM CHLORIDE 500 ML: .6; .31; .03; .02 INJECTION, SOLUTION INTRAVENOUS at 18:10

## 2020-08-09 NOTE — ED PROVIDER NOTES
History  Chief Complaint   Patient presents with    Abdominal Pain     Pt has c/o lower abdominal pain and vomiting for 2 days     Patient is a 67-year-old female, with history significant for urostomy, CKD 4, polycythemia vera, bowel obstruction and 2018, who presents to the ED today with epigastric and right lower quadrant abdominal pain that began yesterday morning  This pain is dull and aching in character and a 9 out of 10 intensity  It has been constant and non progressive since it's onset  Patient states that she felt a similar sensation during her last bowel obstruction  Movement exacerbates her pain and she has not tried anything to remit it  Associated symptoms include:  Chills, nausea, nonbloody vomiting, diminished p o  intake, lack of flatus since Friday, lack of a large bowel movement since Wednesday, increased burping  Patient states that she is thirsty that she has not been able to keep down fluids  Patient states that she has had two abdominal surgeries in the past:  A cholecystectomy and her urostomy  Patient denies tobacco, alcohol, drug use  Notably, patient is chronically anticoagulated due to history of saddle PE  Prior to Admission Medications   Prescriptions Last Dose Informant Patient Reported? Taking? Cholecalciferol (VITAMIN D3) 1000 UNITS CAPS  Self Yes Yes   Sig: Take 1,000 unit marking on U-100 syringe by mouth daily     Eliquis 2 5 MG   No Yes   Sig: TAKE 1 TABLET BY MOUTH TWICE A DAY   JAKAFI 10 MG tablet   No Yes   Sig: Take 1 tablet (10 mg total) by mouth daily   NIFEdipine (ADALAT CC) 30 MG 24 hr tablet   No Yes   Sig: Take 1 tablet (30 mg total) by mouth daily   WELCHOL 625 MG tablet  Self Yes Yes   Sig: as needed    acetaminophen (TYLENOL) 325 mg tablet  Self No Yes   Si mg every 6 hours as needed for mild pain or headache   Patient taking differently: as needed 650 mg every 6 hours as needed for mild pain or headache   calcitriol (ROCALTROL) 0 25 mcg capsule   No Yes   Sig: TAKE 1 CAPSULE BY MOUTH EVERY OTHER DAY  citalopram (CeleXA) 20 mg tablet  Self Yes Yes   Sig: Take 20 mg by mouth daily    levothyroxine 75 mcg tablet  Self Yes Yes   Sig: Take 75 mcg by mouth daily   loperamide (IMODIUM) 2 mg capsule   No Yes   Sig: Take 1 capsule (2 mg total) by mouth 4 (four) times a day as needed for diarrhea   melatonin 3 mg   No Yes   Sig: Take 1 tablet (3 mg total) by mouth daily at bedtime   metoprolol tartrate (LOPRESSOR) 25 mg tablet   No Yes   Sig: Take 1 tablet (25 mg total) by mouth 2 (two) times a day   saccharomyces boulardii (FLORASTOR) 250 mg capsule  Self Yes Yes   Sig: Take 1 capsule by mouth daily        Facility-Administered Medications: None       Past Medical History:   Diagnosis Date    Anxiety     Chronic kidney disease (CKD), stage IV (severe) (HCC)     stage IV    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    Compression fracture of cervical spine (HCC)     Hydronephrosis     Hypertension     Hypothyroid     Incontinence     Lung mass     Improving on PET/CT 1/2016    Polycy[facility]mia vera ([facility] )     Pulmonary embolism ([facility] ) 2014    Urinary tract infection        Past Surgical History:   Procedure Laterality Date    BLADDER SUSPENSION      BOTOX INJECTION N/A 7/27/2016    Procedure: BOTOX INJECTION ;  Surgeon: Dena Winslow MD;  [facility];  Service:     CHOLECYSTECTOMY N/A     COLONOSCOPY      CYSTECTOMY, RADICAL WITH ILEOCONDUIT N/A 10/4/2016    Procedure: SUPRATRIGONAL CYSTECTOMY WITH ILEAL CONDUIT ;  Surgeon: Dena Winslow MD;  [facility];  Service:    Salena [place] W/ RETROGRADES Bilateral 7/27/2016    Procedure: Toña Amend; RETROGRADE PYELOGRAM ;  Surgeon: Dena Winslow MD;  [facility];  Service:     IR CENTRAL LINE PLACEMENT  7/17/2020    FL COLONOSCOPY FLX DX W/COLLJ SPEC WHEN PFRMD N/A 8/31/2016    Procedure: COLONOSCOPY;  Surgeon: Nita Meza MD;  [facility]: [facility];   Service: Gastroenterology    OH CYSTOSCOPY,INSERT URETERAL STENT Bilateral 7/27/2016    Procedure: STENT INSERTION; EXCISION OF MESH ;  Surgeon: German Sher MD;  [facility];  Service: Urology    TONSILLECTOMY      TUBAL LIGATION      WISDOM TOOTH EXTRACTION         Family History   Problem Relation Age of Onset    Cancer Mother         small cell cancer     Heart disease Father     COPD Father     Heart disease Brother     Nephrolithiasis Brother      I have reviewed and agree with the history as documented  E-Cigarette/Vaping    E-Cigarette Use Never User      E-Cigarette/Vaping Substances    Nicotine No     THC No     CBD No     Flavoring No     Other No     Unknown No      Social History     Tobacco Use    Smoking status: Never Smoker    Smokeless tobacco: Never Used    Tobacco comment: n/a   Substance Use Topics    Alcohol use: Not Currently     Frequency: Never     Binge frequency: Never     Comment: n/s    Drug use: Not Currently     Comment: n/a        Review of Systems   Constitutional: Positive for chills  Negative for fever  HENT: Negative for sore throat  Eyes: Negative for visual disturbance  Respiratory: Negative for shortness of breath  Cardiovascular: Negative for chest pain  Gastrointestinal: Positive for abdominal pain, nausea and vomiting (Nonbloody)  Endocrine: Negative for polyuria  Genitourinary: Positive for decreased urine volume  Negative for dysuria  Skin: Positive for rash (Shingles rash on her right buttocks)  Allergic/Immunologic: Positive for environmental allergies (Chlorhexidine)  Neurological: Negative for weakness and numbness  Hematological: Negative for adenopathy  Psychiatric/Behavioral: Negative for confusion         Physical Exam  ED Triage Vitals [08/09/20 1705]   Temperature Pulse Respirations Blood Pressure SpO2   98 1 °F (36 7 °C) 87 18 135/76 95 %      Temp Source Heart Rate Source Patient Position - Orthostatic VS BP Location FiO2 (%)   Oral Monitor Sitting Left arm --      Pain Score       8             Orthostatic Vital Signs  Vitals:    08/09/20 1705 08/09/20 1830   BP: 135/76 166/93   Pulse: 87 77   Patient Position - Orthostatic VS: Sitting Sitting       Physical Exam  Vitals signs and nursing note reviewed  Constitutional:       General: She is not in acute distress  Appearance: She is obese  She is not ill-appearing or toxic-appearing  HENT:      Head: Normocephalic and atraumatic  Right Ear: External ear normal       Left Ear: External ear normal       Nose: Nose normal    Eyes:      General:         Right eye: No discharge  Left eye: No discharge  Cardiovascular:      Rate and Rhythm: Normal rate and regular rhythm  Pulses: Normal pulses  Heart sounds: Normal heart sounds  No murmur  No friction rub  No gallop  Pulmonary:      Effort: Pulmonary effort is normal  No respiratory distress  Breath sounds: Normal breath sounds  No stridor  No wheezing, rhonchi or rales  Abdominal:      General: Bowel sounds are normal  There is distension  Palpations: Abdomen is soft  There is mass (RLQ)  There is no pulsatile mass  Tenderness: There is abdominal tenderness in the right lower quadrant and epigastric area  There is guarding  There is no right CVA tenderness, left CVA tenderness or rebound  Positive signs include McBurney's sign  Negative signs include Fonseca's sign and Rovsing's sign  Comments: Urostomy present at the right lower quadrant  No surrounding erythema or swelling  No pain out of proportion to exam    Lymphadenopathy:      Cervical: No cervical adenopathy  Skin:     General: Skin is warm and dry  Capillary Refill: Capillary refill takes less than 2 seconds  Coloration: Skin is not cyanotic  Comments: Poor skin turgor   Neurological:      Mental Status: She is alert  Comments: Patient is speaking clearly in complete sentences    Patient is answering appropriately  Psychiatric:         Mood and Affect: Mood normal          Behavior: Behavior normal          ED Medications  Medications   prothrombin complex conc human (KCENTRA) 3,000 Units (has no administration in time range)   lactated ringers bolus 500 mL (500 mL Intravenous New Bag 8/9/20 1810)   fentanyl citrate (PF) 100 MCG/2ML 50 mcg (50 mcg Intravenous Given 8/9/20 1807)   ondansetron (ZOFRAN) injection 4 mg (4 mg Intravenous Given 8/9/20 1807)   iohexol (OMNIPAQUE) 240 MG/ML solution 50 mL (50 mL Oral Given 8/9/20 1811)   ondansetron (ZOFRAN) injection 4 mg (4 mg Intravenous Given 8/9/20 2147)   Lidocaine Viscous HCl (XYLOCAINE) 2 % mucosal solution 15 mL (15 mL Swish & Spit Given 8/9/20 2043)       Diagnostic Studies  Results Reviewed     Procedure Component Value Units Date/Time    [facility] [[id number]] Collected:  08/09/20 2145    Lab Status: In process Specimen:  Nares from Nose Updated:  08/09/20 2152    Lactic acid, plasma [[id number]] Collected:  08/09/20 2145    Lab Status: In process Specimen:  Blood from Arm, Right Updated:  08/09/20 2152    Protime-INR [[id number]] Collected:  08/09/20 2144    Lab Status: In process Specimen:  Blood from Arm, Right Updated:  08/09/20 2152    CBC and differential [[id number]]  (Abnormal) Collected:  08/09/20 1716    Lab Status:  Final result Specimen:  Blood from Arm, Right Updated:  08/09/20 1835     WBC 11 21 Thousand/uL      RBC 4 00 Million/uL      Hemoglobin 12 3 g/dL      Hematocrit 36 3 %      MCV 91 fL      MCH 30 8 pg      MCHC 33 9 g/dL      RDW 14 4 %      MPV 9 7 fL      Platelets 822 Thousands/uL      nRBC 0 /100 WBCs     Narrative: This is an appended report  These results have been appended to a previously verified report      Comprehensive metabolic panel [[id number]]  (Abnormal) Collected:  08/09/20 1716    Lab Status:  Final result Specimen:  Blood from Arm, Right Updated:  08/09/20 1741 Sodium 135 mmol/L      Potassium 4 7 mmol/L      Chloride 104 mmol/L      CO2 20 mmol/L      ANION GAP 11 mmol/L      BUN 58 mg/dL      Creatinine 5 17 mg/dL      Glucose 150 mg/dL      Calcium 9 2 mg/dL      AST 16 U/L      ALT 10 U/L      Alkaline Phosphatase 67 U/L      Total Protein 7 8 g/dL      Albumin 3 6 g/dL      Total Bilirubin 0 75 mg/dL      eGFR 8 ml/min/1 73sq m     Narrative:       [place] guidelines for Chronic Kidney Disease (CKD):     Stage 1 with normal or high GFR (GFR > 90 mL/min/1 73 square meters)    Stage 2 Mild CKD (GFR = 60-89 mL/min/1 73 square meters)    Stage 3A Moderate CKD (GFR = 45-59 mL/min/1 73 square meters)    Stage 3B Moderate CKD (GFR = 30-44 mL/min/1 73 square meters)    Stage 4 Severe CKD (GFR = 15-29 mL/min/1 73 square meters)    Stage 5 End Stage CKD (GFR <15 mL/min/1 73 square meters)  Note: GFR calculation is accurate only with a steady state creatinine    Lipase [[id number]]  (Normal) Collected:  08/09/20 1716    Lab Status:  Final result Specimen:  Blood from Arm, Right Updated:  08/09/20 1741     Lipase 205 u/L                  CT abdomen pelvis wo contrast   Final Result by Tyler Watts MD (08/09 2039)      Small bowel obstruction leading up to 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  Abrupt narrowing involving both the afferent and efferent limbs of herniated bowel at point of entry and exit from hernia sac is concerning for developing    closed-loop obstruction  Bilateral hydroureteronephrosis with distention of ileal conduit to the level of entry point at the hernia sac  Findings discussed with Dr Saadia Esteban at 8:38 PM, 8/9/2020  Workstation performed: [id number]               Procedures  Procedures      ED Course  ED Course as of Aug 09 2158   Lynn Terry Aug 09, 2020   1757 Creatinine noted to have increased from 2 18 to 5 17 in the last 3 weeks  Plan to proceed with fluids         1758 Slight leukocytosis      1826 Patient reports improvement in her pain  Patient has no questions or concerns  She appears more comfortable  2030 Per call with radiology: Small bowel obstruction secondary to parastromal hernia with bowel in the hernia  Surgery consulted  Patient also became nauseous and vomited again  Plan to administer Zofran  NG tube was not placed at this time due to difficulty in placing it  US AUDIT      Most Recent Value   Initial Alcohol Screen: US AUDIT-C    1  How often do you have a drink containing alcohol?  0 Filed at: 08/09/2020 1706   2  How many drinks containing alcohol do you have on a typical day you are drinking? 0 Filed at: 08/09/2020 1706   3b  FEMALE Any Age, or MALE 65+: How often do you have 4 or more drinks on one occassion? 0 Filed at: 08/09/2020 1706   Audit-C Score  0 Filed at: 08/09/2020 1706              Identification of Seniors at Risk      Most Recent Value   (ISAR) Identification of Seniors at Risk   Before the illness or injury that brought you to the Emergency, did you need someone to help you on a regular basis? 0 Filed at: 08/09/2020 1707   In the last 24 hours, have you needed more help than usual?  0 Filed at: 08/09/2020 1707   Have you been hospitalized for one or more nights during the past 6 months? 0 Filed at: 08/09/2020 1707   In general, do you see well?  0 Filed at: 08/09/2020 1707   In general, do you have serious problems with your memory? 0 Filed at: 08/09/2020 1707   Do you take more than three different medications every day? 1 Filed at: 08/09/2020 1707   ISAR Score  1 Filed at: 08/09/2020 1707          COLIN/DAST-10      Most Recent Value   How many times in the past year have you    Used an illegal drug or used a prescription medication for non-medical reasons?   Never Filed at: 08/09/2020 1706                              MDM  Number of Diagnoses or Management Options  Abdominal pain:   Acute kidney injury superimposed on chronic kidney disease ([facility] ):   Bandemia:   Hernia of ureteroileal conduit stoma:   Nausea and vomiting, intractability of vomiting not specified, unspecified vomiting type:   Small bowel obstruction [facility]):   Diagnosis management comments: Patient is a 76-year-old female, with history significant for urostomy, CKD 4, polycythemia vera, bowel obstruction and 2018, who presents to the ED today with epigastric and right lower quadrant abdominal pain that began yesterday morning  She states that she had a similar sensation during her prior bowel obstruction  She also describes nausea, vomiting, lack of bowel movement, and flatus  Patient is currently afebrile and hemodynamically stable  Her exam is notable for epigastric pain and right lower quadrant abdominal pain as well as fullness in the right lower quadrant  This presentation is concerning for bowel obstruction  I also considered appendicitis  I have a low clinical suspicion for AAA based upon history and physical exam  Will investigate with CBC, CMP, lipase, CT abdomen pelvis with oral contrast   Will manage her discomfort with fentanyl, as she had tolerated this well in the past, and ondansetron          Disposition  Final diagnoses:   Small bowel obstruction ([facility] )   Acute kidney injury superimposed on chronic kidney disease (HCC)   Nausea and vomiting, intractability of vomiting not specified, unspecified vomiting type   Bandemia   Abdominal pain   Hernia of ureteroileal conduit stoma     Time reflects when diagnosis was documented in both MDM as applicable and the Disposition within this note     Time User Action Codes Description Comment    8/9/2020  9:32 PM Pete Ceja Add [U87 115] Small bowel obstruction ([facility] )     8/9/2020  9:33 PM Pete Ceja Add [N17 9,  N18 9] Acute kidney injury superimposed on chronic kidney disease ([facility] )     8/9/2020  9:33 PM Pete Ceja Add [R11 0] Nausea     8/9/2020  9:34 PM Tiago Posadas Add [R11 2] Nausea and vomiting, intractability of vomiting not specified, unspecified vomiting type     8/9/2020  9:34 PM Downey Distance [R11 0] Nausea     8/9/2020  9:34 PM Anette Shantal Add [D72 825] Bandemia     8/9/2020  9:34 PM Anette Shantal Add [R10 9] Abdominal pain     8/9/2020  9:35 PM Anette Shantal Add [N28 89] Hernia of ureteroileal conduit stoma       ED Disposition     ED Disposition Condition Date/Time Comment    Admit Stable Sun Aug 9, 2020  9:32 PM Case was discussed with ACS/ Surgery and the patient's admission status was agreed to be Admission Status: inpatient status to the service of Dr Michael Carney   Follow-up Information    None         Patient's Medications   Discharge Prescriptions    No medications on file     No discharge procedures on file  PDMP Review     None           ED Provider  Attending physically available and evaluated Yesica Sands  I managed the patient along with the ED Attending      Electronically Signed by         Lloyd Clarke MD  08/09/20 2104

## 2020-08-09 NOTE — ED ATTENDING ATTESTATION
Teresa Monsalve DO, saw and evaluated the patient  I have discussed the patient with the resident/non-physician practitioner and agree with the resident's/non-physician practitioner's findings, Plan of Care, and MDM as documented in the resident's/non-physician practitioner's note, except where noted  All available labs and Radiology studies were reviewed  At this point I agree with the current assessment done in the Emergency Department  I have conducted an independent evaluation of this patient including a focused history and a physical exam     ED Note - Lauren Mcarthur 77 y o  female MRN: [id number]  Unit/Bed#: ED 22 Encounter: [id number]    History of Present Illness   HPI  Lauren Mcarthur is a 77 y o  female who presents for evaluation of abdominal pain associated with nausea and vomiting  Symptoms onset approximately 2 days ago and been progressively worsening  Last BM was 2 days ago and patient not passing flatulence  Decreased urine output into urostomy  History of ileal conduit with urostomy bag  Also history of small-bowel obstruction  Patient on Eliquis for history of DVT/PE  Poor p o  Intake over the last 48 hours as well  No fever chills  REVIEW OF SYSTEMS  See HPI for further details  12 systems reviewed and otherwise negative except as noted     Historical Information     PAST MEDICAL HISTORY  Past Medical History:   Diagnosis Date    Anxiety     Chronic kidney disease (CKD), stage IV (severe) (HCC)     stage IV    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    Compression fracture of cervical spine (HCC)     Hydronephrosis     Hypertension     Hypothyroid     Incontinence     Lung mass     Improving on PET/CT 1/2016    Polycythemia vera ([address] )     Pulmonary embolism ([address] ) 2014    Urinary tract infection        FAMILY HISTORY  Family History   Problem Relation Age of Onset    Cancer Mother         small cell cancer     Heart disease Father     COPD Father     Heart disease Brother     Nephrolithiasis Brother        SOCIAL HISTORY  Social History     Socioeconomic History    Marital status:       Spouse name: None    Number of children: None    Years of education: None    Highest education level: None   Occupational History    None   Social Needs    Financial resource strain: None    Food insecurity     Worry: None     Inability: None    Transportation needs     Medical: None     Non-medical: None   Tobacco Use    Smoking status: Never Smoker    Smokeless tobacco: Never Used    Tobacco comment: n/a   Substance and Sexual Activity    Alcohol use: Not Currently     Frequency: Never     Binge frequency: Never     Comment: n/s    Drug use: Not Currently     Comment: n/a    Sexual activity: Not Currently     Partners: Male   Lifestyle    Physical activity     Days per week: None     Minutes per session: None    Stress: None   Relationships    Social connections     Talks on phone: None     Gets together: None     Attends Congregation service: None     Active member of club or organization: None     Attends meetings of clubs or organizations: None     Relationship status: None    Intimate partner violence     Fear of current or ex partner: None     Emotionally abused: None     Physically abused: None     Forced sexual activity: None   Other Topics Concern    None   Social History Narrative    None       SURGICAL HISTORY  Past Surgical History:   Procedure Laterality Date    BLADDER SUSPENSION      BOTOX INJECTION N/A 7/27/2016    Procedure: BOTOX INJECTION ;  Surgeon: Marvel Quiroz MD;  [facility];  Service:     CHOLECYSTECTOMY N/A     COLONOSCOPY      CYSTECTOMY, RADICAL WITH ILEOCONDUIT N/A 10/4/2016    Procedure: SUPRATRIGONAL CYSTECTOMY WITH ILEAL CONDUIT ;  Surgeon: Marvel Quiroz MD;  [facility];  Service:     CYSTOSCOPY W/ RETROGRADES Bilateral 7/27/2016    Procedure: Susa Lauren; RETROGRADE PYELOGRAM ; Surgeon: Alvaro Gonzalez MD;  [facility];  Service:     IR CENTRAL LINE PLACEMENT  7/17/2020    WI COLONOSCOPY FLX DX W/COLLJ SPEC WHEN PFRMD N/A 8/31/2016    Procedure: COLONOSCOPY;  Surgeon: Lawrence Blum MD;  [facility]; Service: Gastroenterology    WI CYSTOSCOPY,INSERT URETERAL STENT Bilateral 7/27/2016    Procedure: STENT INSERTION; EXCISION OF MESH ;  Surgeon: Alvaro Gonzalez MD;  [facility];  Service: Urology    TONSILLECTOMY      TUBAL LIGATION      WISDOM TOOTH EXTRACTION       Meds/Allergies     CURRENT MEDICATIONS  No current facility-administered medications for this encounter  Current Outpatient Medications:     acetaminophen (TYLENOL) 325 mg tablet, 650 mg every 6 hours as needed for mild pain or headache (Patient taking differently: as needed 650 mg every 6 hours as needed for mild pain or headache), Disp: 30 tablet, Rfl: 0    calcitriol (ROCALTROL) 0 25 mcg capsule, TAKE 1 CAPSULE BY MOUTH EVERY OTHER DAY , Disp: 15 capsule, Rfl: 5    Cholecalciferol (VITAMIN D3) 1000 UNITS CAPS, Take 1,000 unit marking on U-100 syringe by mouth daily  , Disp: , Rfl:     citalopram (CeleXA) 20 mg tablet, Take 20 mg by mouth daily , Disp: , Rfl:     Eliquis 2 5 MG, TAKE 1 TABLET BY MOUTH TWICE A DAY, Disp: 60 tablet, Rfl: 5    JAKAFI 10 MG tablet, Take 1 tablet (10 mg total) by mouth daily, Disp: 30 tablet, Rfl: 5    levothyroxine 75 mcg tablet, Take 75 mcg by mouth daily, Disp: , Rfl: 3    loperamide (IMODIUM) 2 mg capsule, Take 1 capsule (2 mg total) by mouth 4 (four) times a day as needed for diarrhea, Disp: 12 capsule, Rfl: 0    melatonin 3 mg, Take 1 tablet (3 mg total) by mouth daily at bedtime, Disp: 30 tablet, Rfl: 0    metoprolol tartrate (LOPRESSOR) 25 mg tablet, Take 1 tablet (25 mg total) by mouth 2 (two) times a day, Disp: 60 tablet, Rfl: 0    NIFEdipine (ADALAT CC) 30 MG 24 hr tablet, Take 1 tablet (30 mg total) by mouth daily, Disp: 30 tablet, Rfl: 0   saccharomyces boulardii (FLORASTOR) 250 mg capsule, Take 1 capsule by mouth daily  , Disp: , Rfl:     WELCHOL 625 MG tablet, as needed , Disp: , Rfl: 2  (Not in a hospital admission)      ALLERGIES  Allergies   Allergen Reactions    Chlorhexidine Rash     petichi like rash when using chlorhexidine swabs prior to IV  Objective     PHYSICAL EXAM    VITAL SIGNS: Blood pressure 135/76, pulse 87, temperature 98 1 °F (36 7 °C), temperature source Oral, resp  rate 18, weight 91 2 kg (201 lb), SpO2 95 %, not currently breastfeeding  Constitutional:  Appears well developed and well nourished, no acute distress, ill appearance, uncomfortable  Eyes:  PERRL, EOMI, conjunctivae pink, sclerae non-icteric    HENT:  Normocephalic/Atraumatic, no rhinorrhea, mucous membranes moist   Neck: normal range of motion, no tenderness, supple   Respiratory:  No respiratory distress, normal breath sounds   Cardiovascular:  Normal rate, normal rhythm    GI:  Soft, tenderness at kirt-stomal region, distended, urostomy bag in place with small volume urine  :  No CVAT, no flank ecchymosis  Musculoskeletal:  No swelling or edema, no tenderness, no deformities  Integument:  Pink, warm, dry, Well hydrated, no rash, no erythema, no bullae   Lymphatic:  No cervical/ tonsillar/ submandibular lymphadenopathy noted   Neurologic:  Awake, Alert & oriented x 3, CN 2-12 intact, no focal neurological deficits, motor function intact  Psychiatric:  Speech and behavior appropriate       ED COURSE and MDM:    Assessment/Plan   Assessment:  Jann Garcia is a 77 y o  female presents for evaluation of abdominal pain/nausea vomiting with concern for recurrent bowel obstruction  Plan:  Labs, CT a/p, IV fluids, symptom management, disposition as appropriate  CRITICAL CARE TIME: 0 minutes      Portions of the record may have been created with voice recognition software   Occasional wrong word or "sound a like" substitutions may have occurred due to the inherent limitations of voice recognition software       ED Provider  Electronically Signed by

## 2020-08-09 NOTE — ED NOTES
Pt vomited x1, provide with new linens   Patient transported to CT via stretcher     Malou Francois RN  08/09/20 1956

## 2020-08-10 ENCOUNTER — APPOINTMENT (INPATIENT)
Dept: RADIOLOGY | Facility: HOSPITAL | Age: 75
DRG: 354 | End: 2020-08-10
Payer: COMMERCIAL

## 2020-08-10 LAB
ANION GAP SERPL CALCULATED.3IONS-SCNC: 10 MMOL/L (ref 4–13)
BUN SERPL-MCNC: 58 MG/DL (ref 5–25)
CALCIUM SERPL-MCNC: 7.3 MG/DL (ref 8.3–10.1)
CHLORIDE SERPL-SCNC: 110 MMOL/L (ref 100–108)
CO2 SERPL-SCNC: 16 MMOL/L (ref 21–32)
CREAT SERPL-MCNC: 4.88 MG/DL (ref 0.6–1.3)
ERYTHROCYTE [DISTWIDTH] IN BLOOD BY AUTOMATED COUNT: 14.6 % (ref 11.6–15.1)
GFR SERPL CREATININE-BSD FRML MDRD: 8 ML/MIN/1.73SQ M
GLUCOSE SERPL-MCNC: 115 MG/DL (ref 65–140)
GLUCOSE SERPL-MCNC: 138 MG/DL (ref 65–140)
GLUCOSE SERPL-MCNC: 181 MG/DL (ref 65–140)
GLUCOSE SERPL-MCNC: 190 MG/DL (ref 65–140)
HCT VFR BLD AUTO: 31.7 % (ref 34.8–46.1)
HGB BLD-MCNC: 10.4 G/DL (ref 11.5–15.4)
MAGNESIUM SERPL-MCNC: 1.6 MG/DL (ref 1.6–2.6)
MCH RBC QN AUTO: 30.4 PG (ref 26.8–34.3)
MCHC RBC AUTO-ENTMCNC: 32.8 G/DL (ref 31.4–37.4)
MCV RBC AUTO: 93 FL (ref 82–98)
NRBC BLD AUTO-RTO: 0 /100 WBCS
PHOSPHATE SERPL-MCNC: 4.4 MG/DL (ref 2.3–4.1)
PLATELET # BLD AUTO: 238 THOUSANDS/UL (ref 149–390)
PMV BLD AUTO: 9.8 FL (ref 8.9–12.7)
POTASSIUM SERPL-SCNC: 4.2 MMOL/L (ref 3.5–5.3)
RBC # BLD AUTO: 3.42 MILLION/UL (ref 3.81–5.12)
SODIUM SERPL-SCNC: 136 MMOL/L (ref 136–145)
WBC # BLD AUTO: 5.61 THOUSAND/UL (ref 4.31–10.16)

## 2020-08-10 PROCEDURE — 49560 PR REPAIR INCISIONAL HERNIA,REDUCIBLE: CPT | Performed by: SURGERY

## 2020-08-10 PROCEDURE — 99233 SBSQ HOSP IP/OBS HIGH 50: CPT | Performed by: EMERGENCY MEDICINE

## 2020-08-10 PROCEDURE — 71045 X-RAY EXAM CHEST 1 VIEW: CPT

## 2020-08-10 PROCEDURE — 49568 PR IMPLANT MESH HERNIA REPAIR/DEBRIDEMENT CLOSURE: CPT | Performed by: SURGERY

## 2020-08-10 PROCEDURE — 99222 1ST HOSP IP/OBS MODERATE 55: CPT | Performed by: INTERNAL MEDICINE

## 2020-08-10 PROCEDURE — NC001 PR NO CHARGE: Performed by: SURGERY

## 2020-08-10 PROCEDURE — 99223 1ST HOSP IP/OBS HIGH 75: CPT | Performed by: PHYSICIAN ASSISTANT

## 2020-08-10 PROCEDURE — 82948 REAGENT STRIP/BLOOD GLUCOSE: CPT

## 2020-08-10 PROCEDURE — 84100 ASSAY OF PHOSPHORUS: CPT | Performed by: STUDENT IN AN ORGANIZED HEALTH CARE EDUCATION/TRAINING PROGRAM

## 2020-08-10 PROCEDURE — 80048 BASIC METABOLIC PNL TOTAL CA: CPT | Performed by: STUDENT IN AN ORGANIZED HEALTH CARE EDUCATION/TRAINING PROGRAM

## 2020-08-10 PROCEDURE — 83735 ASSAY OF MAGNESIUM: CPT | Performed by: STUDENT IN AN ORGANIZED HEALTH CARE EDUCATION/TRAINING PROGRAM

## 2020-08-10 PROCEDURE — 85027 COMPLETE CBC AUTOMATED: CPT | Performed by: STUDENT IN AN ORGANIZED HEALTH CARE EDUCATION/TRAINING PROGRAM

## 2020-08-10 PROCEDURE — C1781 MESH (IMPLANTABLE): HCPCS | Performed by: SURGERY

## 2020-08-10 DEVICE — PHASIX™ MESH
Type: IMPLANTABLE DEVICE | Site: ABDOMEN | Status: FUNCTIONAL
Brand: PHASIX MESH

## 2020-08-10 RX ORDER — ONDANSETRON 2 MG/ML
4 INJECTION INTRAMUSCULAR; INTRAVENOUS EVERY 4 HOURS PRN
Status: DISCONTINUED | OUTPATIENT
Start: 2020-08-10 | End: 2020-08-24 | Stop reason: HOSPADM

## 2020-08-10 RX ORDER — HEPARIN SODIUM 5000 [USP'U]/ML
5000 INJECTION, SOLUTION INTRAVENOUS; SUBCUTANEOUS EVERY 8 HOURS SCHEDULED
Status: DISCONTINUED | OUTPATIENT
Start: 2020-08-10 | End: 2020-08-11

## 2020-08-10 RX ORDER — FENTANYL CITRATE 50 UG/ML
INJECTION, SOLUTION INTRAMUSCULAR; INTRAVENOUS AS NEEDED
Status: DISCONTINUED | OUTPATIENT
Start: 2020-08-10 | End: 2020-08-10

## 2020-08-10 RX ORDER — HYDROMORPHONE HCL/PF 1 MG/ML
0.2 SYRINGE (ML) INJECTION
Status: DISCONTINUED | OUTPATIENT
Start: 2020-08-10 | End: 2020-08-18

## 2020-08-10 RX ORDER — FENTANYL CITRATE/PF 50 MCG/ML
25 SYRINGE (ML) INJECTION
Status: DISCONTINUED | OUTPATIENT
Start: 2020-08-10 | End: 2020-08-10 | Stop reason: HOSPADM

## 2020-08-10 RX ORDER — SODIUM CHLORIDE 9 MG/ML
INJECTION, SOLUTION INTRAVENOUS CONTINUOUS PRN
Status: DISCONTINUED | OUTPATIENT
Start: 2020-08-10 | End: 2020-08-10

## 2020-08-10 RX ORDER — SODIUM CHLORIDE, SODIUM LACTATE, POTASSIUM CHLORIDE, CALCIUM CHLORIDE 600; 310; 30; 20 MG/100ML; MG/100ML; MG/100ML; MG/100ML
125 INJECTION, SOLUTION INTRAVENOUS CONTINUOUS
Status: DISCONTINUED | OUTPATIENT
Start: 2020-08-10 | End: 2020-08-10

## 2020-08-10 RX ORDER — MAGNESIUM HYDROXIDE 1200 MG/15ML
LIQUID ORAL AS NEEDED
Status: DISCONTINUED | OUTPATIENT
Start: 2020-08-10 | End: 2020-08-10 | Stop reason: HOSPADM

## 2020-08-10 RX ORDER — HEPARIN SODIUM 5000 [USP'U]/ML
7500 INJECTION, SOLUTION INTRAVENOUS; SUBCUTANEOUS EVERY 8 HOURS SCHEDULED
Status: DISCONTINUED | OUTPATIENT
Start: 2020-08-10 | End: 2020-08-10

## 2020-08-10 RX ORDER — GLYCOPYRROLATE 0.2 MG/ML
INJECTION INTRAMUSCULAR; INTRAVENOUS AS NEEDED
Status: DISCONTINUED | OUTPATIENT
Start: 2020-08-10 | End: 2020-08-10

## 2020-08-10 RX ORDER — HYDROMORPHONE HCL/PF 1 MG/ML
0.5 SYRINGE (ML) INJECTION
Status: DISCONTINUED | OUTPATIENT
Start: 2020-08-10 | End: 2020-08-10

## 2020-08-10 RX ORDER — HYDROMORPHONE HCL/PF 1 MG/ML
0.5 SYRINGE (ML) INJECTION
Status: DISCONTINUED | OUTPATIENT
Start: 2020-08-10 | End: 2020-08-24 | Stop reason: HOSPADM

## 2020-08-10 RX ORDER — ALBUMIN, HUMAN INJ 5% 5 %
SOLUTION INTRAVENOUS CONTINUOUS PRN
Status: DISCONTINUED | OUTPATIENT
Start: 2020-08-10 | End: 2020-08-10

## 2020-08-10 RX ORDER — LIDOCAINE HYDROCHLORIDE 10 MG/ML
INJECTION, SOLUTION EPIDURAL; INFILTRATION; INTRACAUDAL; PERINEURAL AS NEEDED
Status: DISCONTINUED | OUTPATIENT
Start: 2020-08-10 | End: 2020-08-10

## 2020-08-10 RX ORDER — LABETALOL 20 MG/4 ML (5 MG/ML) INTRAVENOUS SYRINGE
AS NEEDED
Status: DISCONTINUED | OUTPATIENT
Start: 2020-08-10 | End: 2020-08-10

## 2020-08-10 RX ORDER — DEXAMETHASONE SODIUM PHOSPHATE 10 MG/ML
INJECTION, SOLUTION INTRAMUSCULAR; INTRAVENOUS AS NEEDED
Status: DISCONTINUED | OUTPATIENT
Start: 2020-08-10 | End: 2020-08-10

## 2020-08-10 RX ORDER — ROCURONIUM BROMIDE 10 MG/ML
INJECTION, SOLUTION INTRAVENOUS AS NEEDED
Status: DISCONTINUED | OUTPATIENT
Start: 2020-08-10 | End: 2020-08-10

## 2020-08-10 RX ORDER — ONDANSETRON 2 MG/ML
4 INJECTION INTRAMUSCULAR; INTRAVENOUS ONCE AS NEEDED
Status: DISCONTINUED | OUTPATIENT
Start: 2020-08-10 | End: 2020-08-10 | Stop reason: HOSPADM

## 2020-08-10 RX ORDER — METOPROLOL TARTRATE 5 MG/5ML
2.5 INJECTION INTRAVENOUS EVERY 8 HOURS
Status: DISCONTINUED | OUTPATIENT
Start: 2020-08-10 | End: 2020-08-12

## 2020-08-10 RX ORDER — SUCCINYLCHOLINE/SOD CL,ISO/PF 100 MG/5ML
SYRINGE (ML) INTRAVENOUS AS NEEDED
Status: DISCONTINUED | OUTPATIENT
Start: 2020-08-10 | End: 2020-08-10

## 2020-08-10 RX ORDER — PROPOFOL 10 MG/ML
INJECTION, EMULSION INTRAVENOUS AS NEEDED
Status: DISCONTINUED | OUTPATIENT
Start: 2020-08-10 | End: 2020-08-10

## 2020-08-10 RX ORDER — ONDANSETRON 2 MG/ML
INJECTION INTRAMUSCULAR; INTRAVENOUS AS NEEDED
Status: DISCONTINUED | OUTPATIENT
Start: 2020-08-10 | End: 2020-08-10

## 2020-08-10 RX ORDER — NEOSTIGMINE METHYLSULFATE 1 MG/ML
INJECTION INTRAVENOUS AS NEEDED
Status: DISCONTINUED | OUTPATIENT
Start: 2020-08-10 | End: 2020-08-10

## 2020-08-10 RX ORDER — CEFAZOLIN SODIUM 2 G/50ML
SOLUTION INTRAVENOUS AS NEEDED
Status: DISCONTINUED | OUTPATIENT
Start: 2020-08-10 | End: 2020-08-10

## 2020-08-10 RX ORDER — HYDROMORPHONE HCL/PF 1 MG/ML
0.2 SYRINGE (ML) INJECTION
Status: DISCONTINUED | OUTPATIENT
Start: 2020-08-10 | End: 2020-08-10 | Stop reason: HOSPADM

## 2020-08-10 RX ORDER — HYDROMORPHONE HCL/PF 1 MG/ML
0.5 SYRINGE (ML) INJECTION
Status: DISCONTINUED | OUTPATIENT
Start: 2020-08-10 | End: 2020-08-18

## 2020-08-10 RX ORDER — HYDROMORPHONE HCL/PF 1 MG/ML
0.2 SYRINGE (ML) INJECTION
Status: DISCONTINUED | OUTPATIENT
Start: 2020-08-10 | End: 2020-08-10

## 2020-08-10 RX ADMIN — SODIUM CHLORIDE: 0.9 INJECTION, SOLUTION INTRAVENOUS at 00:40

## 2020-08-10 RX ADMIN — HYDROMORPHONE HYDROCHLORIDE 0.5 MG: 1 INJECTION, SOLUTION INTRAMUSCULAR; INTRAVENOUS; SUBCUTANEOUS at 15:45

## 2020-08-10 RX ADMIN — ONDANSETRON 4 MG: 2 INJECTION INTRAMUSCULAR; INTRAVENOUS at 02:47

## 2020-08-10 RX ADMIN — LABETALOL 20 MG/4 ML (5 MG/ML) INTRAVENOUS SYRINGE 5 MG: at 03:40

## 2020-08-10 RX ADMIN — HYDROMORPHONE HYDROCHLORIDE 0.5 MG: 1 INJECTION, SOLUTION INTRAMUSCULAR; INTRAVENOUS; SUBCUTANEOUS at 19:01

## 2020-08-10 RX ADMIN — GLYCOPYRROLATE 0.4 MG: 0.2 INJECTION, SOLUTION INTRAMUSCULAR; INTRAVENOUS at 03:26

## 2020-08-10 RX ADMIN — SUGAMMADEX 200 MG: 100 INJECTION, SOLUTION INTRAVENOUS at 03:45

## 2020-08-10 RX ADMIN — ALBUMIN (HUMAN): 12.5 SOLUTION INTRAVENOUS at 01:27

## 2020-08-10 RX ADMIN — HEPARIN SODIUM 5000 UNITS: 5000 INJECTION INTRAVENOUS; SUBCUTANEOUS at 21:38

## 2020-08-10 RX ADMIN — PHENYLEPHRINE HYDROCHLORIDE 100 MCG: 10 INJECTION INTRAVENOUS at 00:26

## 2020-08-10 RX ADMIN — METOROPROLOL TARTRATE 2.5 MG: 5 INJECTION, SOLUTION INTRAVENOUS at 11:04

## 2020-08-10 RX ADMIN — HYDROMORPHONE HYDROCHLORIDE 0.2 MG: 1 INJECTION, SOLUTION INTRAMUSCULAR; INTRAVENOUS; SUBCUTANEOUS at 13:04

## 2020-08-10 RX ADMIN — LIDOCAINE HYDROCHLORIDE 50 MG: 10 INJECTION, SOLUTION EPIDURAL; INFILTRATION; INTRACAUDAL; PERINEURAL at 00:22

## 2020-08-10 RX ADMIN — PROPOFOL 130 MG: 10 INJECTION, EMULSION INTRAVENOUS at 00:22

## 2020-08-10 RX ADMIN — Medication 100 MG: at 00:22

## 2020-08-10 RX ADMIN — CEFAZOLIN SODIUM 2000 MG: 2 SOLUTION INTRAVENOUS at 00:39

## 2020-08-10 RX ADMIN — Medication 500 MG: at 00:34

## 2020-08-10 RX ADMIN — FENTANYL CITRATE 25 MCG: 50 INJECTION, SOLUTION INTRAMUSCULAR; INTRAVENOUS at 00:22

## 2020-08-10 RX ADMIN — HYDROMORPHONE HYDROCHLORIDE 0.5 MG: 1 INJECTION, SOLUTION INTRAMUSCULAR; INTRAVENOUS; SUBCUTANEOUS at 08:46

## 2020-08-10 RX ADMIN — PHENYLEPHRINE HYDROCHLORIDE 200 MCG: 10 INJECTION INTRAVENOUS at 00:45

## 2020-08-10 RX ADMIN — PHENYLEPHRINE HYDROCHLORIDE 100 MCG: 10 INJECTION INTRAVENOUS at 00:22

## 2020-08-10 RX ADMIN — NEOSTIGMINE METHYLSULFATE 2 MG: 1 INJECTION, SOLUTION INTRAVENOUS at 03:26

## 2020-08-10 RX ADMIN — SODIUM BICARBONATE 100 ML/HR: 84 INJECTION, SOLUTION INTRAVENOUS at 13:46

## 2020-08-10 RX ADMIN — METOROPROLOL TARTRATE 2.5 MG: 5 INJECTION, SOLUTION INTRAVENOUS at 19:08

## 2020-08-10 RX ADMIN — ROCURONIUM BROMIDE 30 MG: 10 INJECTION, SOLUTION INTRAVENOUS at 00:34

## 2020-08-10 RX ADMIN — HEPARIN SODIUM 7500 UNITS: 5000 INJECTION INTRAVENOUS; SUBCUTANEOUS at 07:37

## 2020-08-10 RX ADMIN — HEPARIN SODIUM 5000 UNITS: 5000 INJECTION INTRAVENOUS; SUBCUTANEOUS at 13:47

## 2020-08-10 RX ADMIN — ROCURONIUM BROMIDE 20 MG: 10 INJECTION, SOLUTION INTRAVENOUS at 01:29

## 2020-08-10 RX ADMIN — HYDROMORPHONE HYDROCHLORIDE 0.5 MG: 1 INJECTION, SOLUTION INTRAMUSCULAR; INTRAVENOUS; SUBCUTANEOUS at 21:37

## 2020-08-10 RX ADMIN — PHENYLEPHRINE HYDROCHLORIDE 200 MCG: 10 INJECTION INTRAVENOUS at 00:30

## 2020-08-10 RX ADMIN — ALBUMIN (HUMAN): 12.5 SOLUTION INTRAVENOUS at 01:07

## 2020-08-10 RX ADMIN — FENTANYL CITRATE 75 MCG: 50 INJECTION, SOLUTION INTRAMUSCULAR; INTRAVENOUS at 01:16

## 2020-08-10 RX ADMIN — PHENYLEPHRINE HYDROCHLORIDE 50 MCG/MIN: 10 INJECTION INTRAVENOUS at 01:03

## 2020-08-10 RX ADMIN — SODIUM CHLORIDE, SODIUM LACTATE, POTASSIUM CHLORIDE, AND CALCIUM CHLORIDE 125 ML/HR: .6; .31; .03; .02 INJECTION, SOLUTION INTRAVENOUS at 05:56

## 2020-08-10 RX ADMIN — DEXAMETHASONE SODIUM PHOSPHATE 5 MG: 10 INJECTION, SOLUTION INTRAMUSCULAR; INTRAVENOUS at 02:47

## 2020-08-10 NOTE — CONSULTS
[address] NOTE   Admission Date: 8/9/2020    Patient Identifiers: Jenniffer Nguyen (MRN: [id number])  Service Requesting Consultation: Carlos Lo DO  Service Providing Consultation:  Urology, Shawnee Champion PA-C  Consults  Date of Service: 8/10/2020    Reason for Consultation:  Small bowel obstruction with ileal conduit hernia    History of Present Illness:     Jenniffer Nguyen is a 77 y o  female with history cystectomy and ileal conduit for nonfunctional bladder with hydronephrosis  She presented with about 1 day nausea and vomiting with abdominal pain  CT scan in the emergency room showed incarcerated small bowel with transition point at the level of parastomal hernia  She underwent emergent exploratory laparotomy parastomal hernia repair receding of the ileal conduit  Creatinine is 4 88  WBC 5 61  Stoma is pink and viable this morning  Urine output 90 cc and pink      Past Medical, Past Surgical History:     Past Medical History:   Diagnosis Date    Anxiety     Chronic kidney disease (CKD), stage IV (severe) (HCC)     stage IV    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    Compression fracture of cervical spine (HCC)     Hydronephrosis     Hypertension     Hypothyroid     Incontinence     Lung mass     Improving on PET/CT 1/2016    [address]a [address] [address]     Pulmonary embolism ([facility] [address] ) 2014    Urinary tract infection    :    Past Surgical History:   Procedure Laterality Date    BLADDER SUSPENSION      BOTOX INJECTION N/A 7/27/2016    Procedure: BOTOX INJECTION ;  Surgeon: Ophelia Soria MD;  [facility];  Service:     CHOLECYSTECTOMY N/A     COLONOSCOPY      CYSTECTOMY, RADICAL WITH ILEOCONDUIT N/A 10/4/2016    Procedure: SUPRATRIGONAL CYSTECTOMY WITH ILEAL CONDUIT ;  Surgeon: Ophelia Soria MD;  [facility];  Service:     CYSTOSCOPY W/ RETROGRADES Bilateral 7/27/2016    Procedure: CYSTOSCOPY; RETROGRADE PYELOGRAM ;  Surgeon: Don Simon MD;  [facility];  Service:     IR CENTRAL LINE PLACEMENT  7/17/2020    AR COLONOSCOPY FLX DX W/COLLJ SPEC WHEN PFRMD N/A 8/31/2016    Procedure: COLONOSCOPY;  Surgeon: aJime Armijo MD;  [facility]; Service: Gastroenterology    AR CYSTOSCOPY,INSERT URETERAL STENT Bilateral 7/27/2016    Procedure: STENT INSERTION; EXCISION OF MESH ;  Surgeon: Don Simon MD;  [facility];  Service: Urology    TONSILLECTOMY      TUBAL LIGATION      WISDOM TOOTH EXTRACTION     :    Medications, Allergies:     Current Facility-Administered Medications:     heparin (porcine) subcutaneous injection 7,500 Units, 7,500 Units, Subcutaneous, Q8H [address], 7,500 Units at 08/10/20 0737 **AND** [CANCELED] Platelet count, , , Once, Alyce Moreland MD    HYDROmorphone (DILAUDID) injection 0 2 mg, 0 2 mg, Intravenous, Q3H PRN, Alyce Moreland MD    HYDROmorphone (DILAUDID) injection 0 5 mg, 0 5 mg, Intravenous, Q3H PRN, Alyce Moreland MD, 0 5 mg at 08/10/20 0846    lactated ringers infusion, 125 mL/hr, Intravenous, Continuous, Alyce Moreland MD, Last Rate: 125 mL/hr at 08/10/20 0556, 125 mL/hr at 08/10/20 0556    ondansetron (ZOFRAN) injection 4 mg, 4 mg, Intravenous, Q4H PRN, Alyce Moreland MD    [facility] Hold] prothrombin complex conc human (KCENTRA) 3,000 Units, 35 Units/kg, Intravenous, Once, Alyce Moreland MD    Allergies: Allergies   Allergen Reactions    Chlorhexidine Rash     petichi like rash when using chlorhexidine swabs prior to IV     :    Social and Family History:   Social History:   Social History     Tobacco Use    Smoking status: Never Smoker    Smokeless tobacco: Never Used    Tobacco comment: n/a   Substance Use Topics    Alcohol use: Not Currently     Frequency: Never     Binge frequency: Never     Comment: n/s    Drug use: Not Currently     Comment: n/a         Social History     Tobacco Use   Smoking Status Never Smoker   Smokeless Tobacco Never Used Tobacco Comment    n/a       Family History:  Family History   Problem Relation Age of Onset    Cancer Mother         small cell cancer     Heart disease Father     COPD Father     Heart disease Brother     Nephrolithiasis Brother    :     Review of Systems:     General: Fever, chills, or night sweats: negative  Cardiac: Negative for chest pain  Pulmonary: Negative for shortness of breath  Gastrointestinal: Abdominal pain positive  Nausea, vomiting, or diarrhea positive,  Genitourinary: See HPI above  Patient does not have hematuria  All other systems queried were negative  Physical Exam:   General: Patient is pleasant and in NAD  Awake and alert  /68   Pulse 86   Temp 98 1 °F (36 7 °C) (Oral)   Resp 16   Ht 5' (1 524 m)   Wt 89 2 kg (196 lb 10 4 oz)   SpO2 100%   BMI 38 41 kg/m²   HEENT:  No scleral icterus conjunctiva are clear  Constitutional:  Pleasant cooperative 55-year-old female  Cardiac: Peripheral edema: negative  Pulmonary: Non-labored breathing  Abdomen:  Incision covered and intact  Stoma is pink and viable with visible urine output  Abdomen soft and appropriately tender postoperatively    Genitourinary: Negative CVA tenderness, negative suprapubic tenderness    Extremities:  Moves all extremities without any weakness or deformity  Neurological:  Alert and oriented nonfocal neurologic exam  Psychiatric:  Behavior affect and mood normal  ILEAL CONDUIT: draining pink clear urine  no clots and       Labs:     Lab Results   Component Value Date    HGB 10 4 (L) 08/10/2020    HCT 31 7 (L) 08/10/2020    WBC 5 61 08/10/2020     08/10/2020   ]    Lab Results   Component Value Date     12/17/2015    K 4 2 08/10/2020     (H) 08/10/2020    CO2 16 (L) 08/10/2020    BUN 58 (H) 08/10/2020    CREATININE 4 88 (H) 08/10/2020    CALCIUM 7 3 (L) 08/10/2020    GLUCOSE 138 10/04/2016   ]    Imaging:   I personally reviewed the images and report of the following studies, and reviewed them with the patient:  CT ABDOMEN AND PELVIS WITHOUT IV CONTRAST   IMPRESSION:     Small bowel obstruction leading up to 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  Abrupt narrowing involving both the afferent and efferent limbs of herniated bowel at point of entry and exit from hernia sac is concerning for developing   closed-loop obstruction  Bilateral hydroureteronephrosis with distention of ileal conduit to the level of entry point at the hernia sac  ASSESSMENT:     1  Small-bowel obstruction  2  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

## 2020-08-10 NOTE — H&P
History and Physical - General Surgery  Anaya Fowler 77 y o  female MRN: [id number]  Unit/Bed#: ED 22 Encounter: [id number]        Assessment/Plan     Assessment:  77 F with SBO secondary to incarcerated parastomal hernia, ARF with hydronephrosis    Plan: To OR for exploratory laparotomy, parastomal hernia repair, possible bowel resection, possible re-siting of ileal conduit  NPO/IVF  Blood on hold  Karmen Ferraro for Eliquis reversal    History of Present Illness     HPI:  Anaya Fowler is a 77 y o  female who presents to the hospital with abdominal pain  She states started about 1 day ago  It is associated with nausea and vomiting  She does a history of 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 hydronephrosis  She also has a history of polycythemia vera as well as multiple DVT and PEs for which she is on Eliquis, as well as chronic kidney disease  Workup in the emergency department reveals incarcerated small bowel with a transition point at the level of parastomal hernia causing bowel obstruction  Review of Systems   Constitutional: Positive for activity change  HENT: Negative  Eyes: Negative  Respiratory: Negative  Cardiovascular: Negative  Gastrointestinal: Positive for abdominal distention, abdominal pain, constipation, nausea and vomiting  Endocrine: Negative  Genitourinary: Positive for difficulty urinating  Musculoskeletal: Negative  Skin: Negative  Allergic/Immunologic: Negative  Neurological: Negative  Hematological: Negative  Psychiatric/Behavioral: Negative          Historical Information   Past Medical History:   Diagnosis Date    Anxiety     Chronic kidney disease (CKD), stage IV (severe) (HCC)     stage IV    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    Compression fracture of cervical spine (HCC)     Hydronephrosis     Hypertension     Hypothyroid     Incontinence     Lung mass     Improving on PET/CT 1/2016    [address]a ([facility] [address] )     Pulmonary embolism ([facility] [address] ) 2014    Urinary tract infection      Past Surgical History:   Procedure Laterality Date    BLADDER SUSPENSION      BOTOX INJECTION N/A 7/27/2016    Procedure: BOTOX INJECTION ;  Surgeon: Susan Rosario MD;  [facility];  Service:    [address], RADICAL WITH ILEOCONDUIT N/A 10/4/2016    Procedure: Jose Maria Mustache WITH ILEAL CONDUIT ;  Surgeon: Susan Rosario MD;  [facility];  Service:    Jacque Grandchild W/ RETROGRADES Bilateral 7/27/2016    Procedure: Tharon Pentecostalism; RETROGRADE PYELOGRAM ;  Surgeon: Susan Rosario MD;  [facility];  Service:     IR CENTRAL LINE PLACEMENT  7/17/2020    WI COLONOSCOPY FLX DX W/COLLJ SPEC WHEN PFRMD N/A 8/31/2016    Procedure: COLONOSCOPY;  Surgeon: Sixto Yabrra MD;  [facility]; Service: Gastroenterology    WI CYSTOSCOPY,INSERT URETERAL STENT Bilateral 7/27/2016    Procedure: STENT INSERTION; EXCISION OF MESH ;  Surgeon: Susan Rosario MD;  [facility];  Service: Urology    TONSILLECTOMY      TUBAL LIGATION      WISDOM TOOTH EXTRACTION       Social History   Social History     Substance and Sexual Activity   Alcohol Use Not Currently    Frequency: Never    Binge frequency: Never    Comment: n/s     Social History     Substance and Sexual Activity   Drug Use Not Currently    Comment: n/a     Social History     Tobacco Use   Smoking Status Never Smoker   Smokeless Tobacco Never Used   Tobacco Comment    n/a     Family History:   Family History   Problem Relation Age of Onset    Cancer Mother         small cell cancer     Heart disease Father     COPD Father     Heart disease Brother     Nephrolithiasis Brother        Meds/Allergies   PTA meds:   Prior to Admission Medications   Prescriptions Last Dose Informant Patient Reported? Taking?    Cholecalciferol (VITAMIN D3) 1000 UNITS CAPS  Self Yes Yes   Sig: Take 1,000 unit marking on U-100 syringe by mouth daily  Eliquis 2 5 MG   No Yes   Sig: TAKE 1 TABLET BY MOUTH TWICE A DAY   JAKAFI 10 MG tablet   No Yes   Sig: Take 1 tablet (10 mg total) by mouth daily   NIFEdipine (ADALAT CC) 30 MG 24 hr tablet   No Yes   Sig: Take 1 tablet (30 mg total) by mouth daily   WELCHOL 625 MG tablet  Self Yes Yes   Sig: as needed    acetaminophen (TYLENOL) 325 mg tablet  Self No Yes   Si mg every 6 hours as needed for mild pain or headache   Patient taking differently: as needed 650 mg every 6 hours as needed for mild pain or headache   calcitriol (ROCALTROL) 0 25 mcg capsule   No Yes   Sig: TAKE 1 CAPSULE BY MOUTH EVERY OTHER DAY  citalopram (CeleXA) 20 mg tablet  Self Yes Yes   Sig: Take 20 mg by mouth daily    levothyroxine 75 mcg tablet  Self Yes Yes   Sig: Take 75 mcg by mouth daily   loperamide (IMODIUM) 2 mg capsule   No Yes   Sig: Take 1 capsule (2 mg total) by mouth 4 (four) times a day as needed for diarrhea   melatonin 3 mg   No Yes   Sig: Take 1 tablet (3 mg total) by mouth daily at bedtime   metoprolol tartrate (LOPRESSOR) 25 mg tablet   No Yes   Sig: Take 1 tablet (25 mg total) by mouth 2 (two) times a day   saccharomyces boulardii (FLORASTOR) 250 mg capsule  Self Yes Yes   Sig: Take 1 capsule by mouth daily        Facility-Administered Medications: None     Allergies   Allergen Reactions    Chlorhexidine Rash     petichi like rash when using chlorhexidine swabs prior to IV          Objective   First Vitals:   Blood Pressure: 135/76 (20)  Pulse: 87 (20)  Temperature: 98 1 °F (36 7 °C) (20)  Temp Source: Oral (20)  Respirations: 18 (20)  Weight - Scale: 91 2 kg (201 lb) (20)  SpO2: 95 % (20)    Current Vitals:   Blood Pressure: 166/93 (20)  Pulse: 77 (20)  Temperature: 98 1 °F (36 7 °C) (20)  Temp Source: Oral (20)  Respirations: 18 (20 9010)  Weight - Scale: 91 2 kg (201 lb) (08/09/20 1705)  SpO2: 95 % (08/09/20 1830)    No intake or output data in the 24 hours ending 08/09/20 2152    Invasive Devices     Peripheral Intravenous Line            Peripheral IV 08/09/20 Right Antecubital less than 1 day          Drain            Urostomy Ileal conduit RUQ 1405 days                Physical Exam  HENT:      Head: Normocephalic  Mouth/Throat:      Mouth: Mucous membranes are moist    Eyes:      Pupils: Pupils are equal, round, and reactive to light  Cardiovascular:      Rate and Rhythm: Normal rate  Pulmonary:      Effort: No respiratory distress  Abdominal:      Comments: Soft, fullness around right ileal conduit, unable to reduce hernia, conduit pink and viable-appearing   Musculoskeletal:         General: No tenderness  Skin:     General: Skin is warm and dry  Capillary Refill: Capillary refill takes less than 2 seconds  Neurological:      General: No focal deficit present  Mental Status: She is alert  Psychiatric:         Mood and Affect: Mood normal          Lab Results:   CBC:   Lab Results   Component Value Date    WBC 11 21 (H) 08/09/2020    HGB 12 3 08/09/2020    HCT 36 3 08/09/2020    MCV 91 08/09/2020     08/09/2020    MCH 30 8 08/09/2020    MCHC 33 9 08/09/2020    RDW 14 4 08/09/2020    MPV 9 7 08/09/2020    NRBC 0 08/09/2020   , CMP:   Lab Results   Component Value Date    SODIUM 135 (L) 08/09/2020    K 4 7 08/09/2020     08/09/2020    CO2 20 (L) 08/09/2020    BUN 58 (H) 08/09/2020    CREATININE 5 17 (H) 08/09/2020    CALCIUM 9 2 08/09/2020    AST 16 08/09/2020    ALT 10 (L) 08/09/2020    ALKPHOS 67 08/09/2020    EGFR 8 08/09/2020   , Coagulation: No results found for: PT, INR, APTT  Imaging: I have personally reviewed pertinent reports  EKG, Pathology, and Other Studies: I have personally reviewed pertinent reports        Code Status: Prior  Advance Directive and Living Will:      Power of :    POLST:

## 2020-08-10 NOTE — ANESTHESIA PROCEDURE NOTES
Central Line Insertion  Performed by: Cathie Lloyd MD  Authorized by: Cathie Lloyd MD     Date/Time: 8/10/2020 1:00 AM  Catheter Type:  triple lumen  Consent: The procedure was performed in an emergent situation  Verbal consent obtained    Risks and benefits: risks, benefits and alternatives were discussed  Required items: required blood products, implants, devices, and special equipment available  Patient identity confirmed: arm band  Indications: vascular access  Location details: left internal jugular  Catheter size: 7 Fr  Patient position: Trendelenburg    Sedation:  Patient sedated: yes (General Anesthesia)    Assessment: blood return through all ports and free fluid flow  Preparation: Betadine and skin prepped with Betadine  Skin prep agent dried: skin prep agent completely dried prior to procedure  Sterile barriers: all five maximum sterile barriers used - cap, mask, sterile gown, sterile gloves, and large sterile sheet  Hand hygiene: hand hygiene performed prior to central venous catheter insertion  Ultrasound guidance: yes  sterile gel and probe cover used in ultrasound-guided central venous catheter insertionSite selection rationale: Right IJ Occlusion  Pre-procedure: landmarks identified  Number of attempts: 2  Successful placement: yes  Post-procedure: chlorhexidine patch applied,  dressing applied and line sutured  Patient tolerance: Patient tolerated the procedure well with no immediate complications

## 2020-08-10 NOTE — PLAN OF CARE
Problem: Potential for Falls  Goal: Patient will remain free of falls  Description: INTERVENTIONS:  - Assess patient frequently for physical needs  -  Identify cognitive and physical deficits and behaviors that affect risk of falls    -  [place] fall precautions as indicated by assessment   - Educate patient/family on patient safety including physical limitations  - Instruct patient to call for assistance with activity based on assessment  - Modify environment to reduce risk of injury  - Consider OT/PT consult to assist with strengthening/mobility  Outcome: Progressing     Problem: Prexisting or High Potential for Compromised Skin Integrity  Goal: Skin integrity is maintained or improved  Description: INTERVENTIONS:  - Identify patients at risk for skin breakdown  - Assess and monitor skin integrity  - Assess and monitor nutrition and hydration status  - Monitor labs   - Assess for incontinence   - Turn and reposition patient  - Assist with mobility/ambulation  - Relieve pressure over bony prominences  - Avoid friction and shearing  - Provide appropriate hygiene as needed including keeping skin clean and dry  - Evaluate need for skin moisturizer/barrier cream  - Collaborate with interdisciplinary team   - Patient/family teaching  - Consider wound care consult   Outcome: Progressing

## 2020-08-10 NOTE — PROGRESS NOTES
Daily Progress Note - [address] [facility] 77 y o  female MRN: [id number]  Unit/Bed#: [facility] 018-37 Encounter: [id number]        ----------------------------------------------------------------------------------------  HPI/24hr events: s/p ex-lap, parastomal hernia repair    ---------------------------------------------------------------------------------------  SUBJECTIVE  C/o abd pain worsened by movement     Review of Systems   Respiratory: Negative for shortness of breath  Cardiovascular: Negative for chest pain  Gastrointestinal: Positive for abdominal distention and abdominal pain  Negative for nausea  Neurological: Negative for weakness and numbness  All other systems reviewed and are negative      Review of systems was reviewed and negative unless stated above in HPI/24-hour events   ---------------------------------------------------------------------------------------  Assessment and Plan:    Neuro:    Diagnosis: acute post-op pain   o PRN dilaudid         CV:    Diagnosis: no acute issues   o Plan: monitor on tele     Pulm:   Diagnosis: no acute issues       GI:    Diagnosis: SBO s/o ex lap   o Plan: NPO   o Maintain fluids   o abd exams     :    Diagnosis: LUANN on CDK   o Plan: I&Os   o Trend creat   o Nephrology following         F/E/N:    Plan: bicarb gtt    replace lytes as needed    NPO       Heme/Onc:    Diagnosis: hyperglycemia   o Plan: glucose checks   o Goal 140-180      Endo:    Diagnosis: hyperglycemia   o Plan: glucose checks   o Goal 140-180      ID:    Diagnosis: no acute issues       MSK/Skin:    Diagnosis: Risk for pressure ulcer   o Plan: PT/OT  o OOB to chair    Diagnosis: wound   o Plan: keep clean and dry  o Wound care following        Disposition: Continue Stepdown Level 2 level of care   Code Status: Level 1 - Full Code  ---------------------------------------------------------------------------------------  ICU CORE MEASURES    Prophylaxis   VTE Pharmacologic Prophylaxis: Heparin  VTE Mechanical Prophylaxis: sequential compression device  Stress Ulcer Prophylaxis: Prophylaxis Not Indicated       Invasive Devices Review  Invasive Devices     Central Venous Catheter Line            CVC Central Lines 08/10/20 Triple less than 1 day          Peripheral Intravenous Line            Peripheral IV 20 Right Antecubital less than 1 day    Peripheral IV 08/10/20 Left Hand less than 1 day          Arterial Line            Arterial Line 08/10/20 Right Radial less than 1 day          Drain            Urostomy Ileal conduit RUQ 1405 days    Closed/Suction Drain Left LLQ Bulb 19 Fr  less than 1 day    NG/OG/Enteral Tube Nasogastric Right nares less than 1 day              Can any invasive devices be discontinued today? No  ---------------------------------------------------------------------------------------  OBJECTIVE    Vitals   Vitals:    08/10/20 1242 08/10/20 1300 08/10/20 1342 08/10/20 1400   BP: 160/88  152/92    Pulse: 86 90 90 86   Resp: 17 19 18 17   Temp:       TempSrc:       SpO2: 95% 97% 97% 95%   Weight:       Height:         Temp (24hrs), Av 2 °F (36 8 °C), Min:97 1 °F (36 2 °C), Max:99 9 °F (37 7 °C)  Current: Temperature: 99 9 °F (37 7 °C)  HR: 89  BP: 138/72  RR: 14  SpO2: 94    Respiratory:  SpO2: SpO2: 94 %       Invasive/non-invasive ventilation settings   Respiratory    Lab Data (Last 4 hours)    None         O2/Vent Data (Last 4 hours)    None                Physical Exam  Vitals signs and nursing note reviewed  HENT:      Head: Normocephalic and atraumatic  Mouth/Throat:      Mouth: Mucous membranes are moist    Eyes:      Pupils: Pupils are equal, round, and reactive to light  Neck:      Musculoskeletal: Normal range of motion  Cardiovascular:      Rate and Rhythm: Normal rate and regular rhythm  Pulses: Normal pulses  Pulmonary:      Effort: Pulmonary effort is normal       Breath sounds: Normal breath sounds  Abdominal:      General: There is distension  Tenderness: There is abdominal tenderness  Musculoskeletal:         General: No swelling  Right lower leg: No edema  Left lower leg: No edema  Skin:     General: Skin is warm and dry  Capillary Refill: Capillary refill takes less than 2 seconds  Neurological:      General: No focal deficit present  Mental Status: She is alert and oriented to person, place, and time  Psychiatric:         Mood and Affect: Mood normal          Behavior: Behavior normal          Laboratory and Diagnostics:  Results from last 7 days   Lab Units 08/10/20  0607 08/09/20  1716   WBC Thousand/uL 5 61 11 21*   HEMOGLOBIN g/dL 10 4* 12 3   HEMATOCRIT % 31 7* 36 3   PLATELETS Thousands/uL 238 337   BANDS PCT %  --  10*   MONO PCT %  --  3*     Results from last 7 days   Lab Units 08/10/20  0607 08/09/20  1716   SODIUM mmol/L 136 135*   POTASSIUM mmol/L 4 2 4 7   CHLORIDE mmol/L 110* 104   CO2 mmol/L 16* 20*   ANION GAP mmol/L 10 11   BUN mg/dL 58* 58*   CREATININE mg/dL 4 88* 5 17*   CALCIUM mg/dL 7 3* 9 2   GLUCOSE RANDOM mg/dL 181* 150*   ALT U/L  --  10*   AST U/L  --  16   ALK PHOS U/L  --  67   ALBUMIN g/dL  --  3 6   TOTAL BILIRUBIN mg/dL  --  0 75     Results from last 7 days   Lab Units 08/10/20  0607   MAGNESIUM mg/dL 1 6   PHOSPHORUS mg/dL 4 4*      Results from last 7 days   Lab Units 08/09/20  2144   INR  1 41*          Results from last 7 days   Lab Units 08/09/20  2145   LACTIC ACID mmol/L 1 5     ABG:    VBG:          Micro        EKG:   Imaging:  I have personally reviewed pertinent reports  and I have personally reviewed pertinent films in PACS    Intake and Output  I/O       08/08 0701 - 08/09 0700 08/09 0701 - 08/10 0700 08/10 0701 - 08/11 0700    P  O    0    I V  (mL/kg)  1700 (19 1) 758 3 (8 5)    NG/GT   0    IV Piggyback  500     Total Intake(mL/kg)  2200 (24 7) 758 3 (8 5)    Urine (mL/kg/hr)  165 360 (0 6)    Emesis/NG output  200 150 Drains  80 40    Total Output  445 550    Net  +1755 +208 3               UOP:  ml/hr     Height and Weights   Height: 5' (152 4 cm)  IBW: 45 5 kg  Body mass index is 38 41 kg/m²  Weight (last 2 days)     Date/Time   Weight    08/10/20 0600   89 2 (196 65)    08/09/20 1705   91 2 (201)                Nutrition       Diet Orders   (From admission, onward)             Start     Ordered    08/10/20 0508  Diet NPO  Diet effective now     Question Answer Comment   Diet Type NPO    RD to adjust diet per protocol?  Yes        08/10/20 0508                    Active Medications  Scheduled Meds:  Current Facility-Administered Medications   Medication Dose Route Frequency Provider Last Rate    heparin (porcine)  5,000 Units Subcutaneous Q8H [address] VICTORINA Thomas      HYDROmorphone  0 2 mg Intravenous Q3H PRN Arvell Shorten, PA-TRINIDAD      HYDROmorphone  0 5 mg Intravenous Q3H PRN Arvell Shorten, PA-TRINIDAD      HYDROmorphone  0 5 mg Intravenous Q3H PRN Arvell Shorten, PA-TRINIDAD      metoprolol  2 5 mg Intravenous [address], PA-C      ondansetron  4 mg Intravenous Q4H PRN Tony Larios MD      [facility] Hold] Kcentra (PROTHROMBIN)  35 Units/kg Intravenous Once Tony Larios MD      sodium bicarbonate infusion  100 mL/hr Intravenous Continuous Anisha Swift  mL/hr (08/10/20 1346)     Continuous Infusions:  sodium bicarbonate infusion, 100 mL/hr, Last Rate: 100 mL/hr (08/10/20 1346)      PRN Meds:   HYDROmorphone, 0 2 mg, Q3H PRN  HYDROmorphone, 0 5 mg, Q3H PRN  HYDROmorphone, 0 5 mg, Q3H PRN  ondansetron, 4 mg, Q4H PRN        Allergies   Allergies   Allergen Reactions    Chlorhexidine Rash     petichi like rash when using chlorhexidine swabs prior to IV       ---------------------------------------------------------------------------------------  Advance Directive and Living Will:      Power of :    POLST:    ---------------------------------------------------------------------------------------  Care Time Delivered:   No Critical Care time spent     [facility], VICTORINA      Portions of the record may have been created with voice recognition software  Occasional wrong word or "sound a like" substitutions may have occurred due to the inherent limitations of voice recognition software    Read the chart carefully and recognize, using context, where substitutions have occurred

## 2020-08-10 NOTE — CONSULTS
Consultation - Nephrology   Andrea Kong 77 y o  female MRN: [id number]  Unit/Bed#: [facility] 518-01 Encounter: [id number]      Assessment/Plan:  1  Acute kidney injury, most likely secondary to hemodynamic fluctuations as well as obstructive uropathy given hydronephrosis  2  Chronic kidney disease previous baseline creatinine in the mid 2s to low 3's, follows with Dr Payam Li  3  Metabolic acidosis secondary to ongoing renal failure, change fluids to sodium bicarbonate  4  Bilateral hydronephrosis to the level of the ileal conduit  5  History cystectomy with ileal conduit  6  Postoperative incarcerated ileal conduit parastomal hernia status post ex lap and parastomal hernia repair  7  Anemia, continue monitor closely  8  Plan:  · Overall renal function slightly improved at 4 8  · Change IV fluids to sodium bicarbonate given metabolic acidosis, predominantly non gap  · Continue monitor electrolytes and renal function closely  · Blood pressure volume status appears stable    History of Present Illness   Physician Requesting Consult: Diamond Bennett DO  Reason for Consult / Principal Problem:  Acute kidney injury  HPI: Andrea Kong is a 77y o  year old female who presents with abdominal pain  Patient is a 78-year-old female with well known history of chronic kidney disease, stage IV with history of ileal conduit secondary to nonfunctioning bladder and hydronephrosis  Patient presents with 1 day of nausea vomiting as well as abdominal pain  CT scan showed incarcerated small bowel with a transition point to the level the parastomal hernia  She underwent emergent exploratory laparotomy with repair  Currently patient is awake alert  Pain seems to be well controlled  Denies any chest pain or shortness of breath  History obtained from the patient    Review of Systems   Constitutional: Positive for appetite change and fatigue  Respiratory: Negative for shortness of breath      Cardiovascular: Negative for chest pain and leg swelling  Gastrointestinal: Positive for abdominal pain, nausea and vomiting  Neurological: Negative for syncope         Pertinent findings of a 10 point review of systems noted above otherwise all others negative    Historical Information   Patient Active Problem List   Diagnosis    Chronic saddle pulmonary embolism (HCC)    Stage 4 chronic kidney disease (HCC)    Bladder mass    Small bowel obstruction (HCC)    Obstructive uropathy    Secondary hyperparathyroidism of renal origin ([facility] )    Hypothyroidism    Hypertension    Polycythemia vera ([facility] )    Fall    Subdural hematoma, acute (HCC)    Intraventricular hemorrhage (HCC)    Diarrhea    Recurrent Clostridium difficile diarrhea    Anemia in CKD (chronic kidney disease)    Mass of urethra    Stage 5 chronic kidney disease not on chronic dialysis ([facility] )    Thoracic compression fracture (HCC)    Hematuria    Abnormal finding on MRI of brain    Benign neoplasm of supratentorial region of brain ([facility] )    Meningioma ([facility] )    UTI (urinary tract infection)    Herpes zoster    Inverted T wave    Polycythemia     Past Medical History:   Diagnosis Date    Anxiety     Chronic kidney disease (CKD), stage IV (severe) (HCC)     stage IV    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    Compression fracture of cervical spine (HCC)     Hydronephrosis     Hypertension     Hypothyroid     Incontinence     Lung mass     Improving on PET/CT 1/2016    Polycythemia vera ([address] )     Pulmonary embolism ([facility] ) 2014    Urinary tract infection      Past Surgical History:   Procedure Laterality Date    ABDOMINAL ADHESION SURGERY N/A 8/9/2020    Procedure: LYSIS ADHESIONS;  Surgeon: Edenilson Gamez DO;  [facility];  Service: General    BLADDER SUSPENSION      BOTOX INJECTION N/A 7/27/2016    Procedure: BOTOX INJECTION ;  Surgeon: Antonio Cantor MD;  [facility];  Service:    Mila Hoskins CHOLECYSTECTOMY N/A     COLONOSCOPY      CYSTECTOMY, RADICAL WITH ILEOCONDUIT N/A 10/4/2016    Procedure: SUPRATRIGONAL CYSTECTOMY WITH ILEAL CONDUIT ;  Surgeon: Felicitas Wheeler MD;  [facility];  Service:    Ace Levin CYSTOSCOPY W/ RETROGRADES Bilateral 7/27/2016    Procedure: [employer]; RETROGRADE PYELOGRAM ;  Surgeon: Felicitas Wheeler MD;  [facility];  Service:     IR CENTRAL LINE PLACEMENT  7/17/2020    LAPAROTOMY N/A 8/9/2020    Procedure: LAPAROTOMY EXPLORATORY, PARASTOMAL HERNIA REPAIR WITH MESH;  Surgeon: Yudy Her DO;  [facility];  Service: General    CO COLONOSCOPY FLX DX W/COLLJ SPEC WHEN PFRMD N/A 8/31/2016    Procedure: COLONOSCOPY;  Surgeon: Eliane Scott MD;  [facility];   Service: Gastroenterology    CO CYSTOSCOPY,INSERT URETERAL STENT Bilateral 7/27/2016    Procedure: STENT INSERTION; EXCISION OF MESH ;  Surgeon: Felicitas Wheeler MD;  [facility];  Service: Urology    TONSILLECTOMY      TUBAL LIGATION      WISDOM TOOTH EXTRACTION       Social History   Social History     Substance and Sexual Activity   Alcohol Use Not Currently    Frequency: Never    Binge frequency: Never    Comment: n/s     Social History     Substance and Sexual Activity   Drug Use Not Currently    Comment: n/a     Social History     Tobacco Use   Smoking Status Never Smoker   Smokeless Tobacco Never Used   Tobacco Comment    n/a     Family History   Problem Relation Age of Onset    Cancer Mother         small cell cancer     Heart disease Father     COPD Father     Heart disease Brother     Nephrolithiasis Brother        Meds/Allergies   current meds:   Current Facility-Administered Medications   Medication Dose Route Frequency    heparin (porcine) subcutaneous injection 5,000 Units  5,000 Units Subcutaneous Q8H [address]    HYDROmorphone (DILAUDID) injection 0 2 mg  0 2 mg Intravenous Q3H PRN    HYDROmorphone (DILAUDID) injection 0 5 mg  0 5 mg Intravenous Q3H PRN    HYDROmorphone (DILAUDID) injection 0 5 mg  0 5 mg Intravenous Q3H PRN    metoprolol (LOPRESSOR) injection 2 5 mg  2 5 mg Intravenous Q8H    ondansetron (ZOFRAN) injection 4 mg  4 mg Intravenous Q4H PRN    [MAR Hold] prothrombin complex conc human (KCENTRA) 3,000 Units  35 Units/kg Intravenous Once    sodium bicarbonate 150 mEq in dextrose 5 % 1,000 mL infusion  100 mL/hr Intravenous Continuous       Allergies   Allergen Reactions    Chlorhexidine Rash     petichi like rash when using chlorhexidine swabs prior to IV  Objective   /76   Pulse 84   Temp 99 9 °F (37 7 °C) (Oral)   Resp 15   Ht 5' (1 524 m)   Wt 89 2 kg (196 lb 10 4 oz)   SpO2 92%   BMI 38 41 kg/m²     Intake/Output Summary (Last 24 hours) at 8/10/2020 1221  Last data filed at 8/10/2020 1200  Gross per 24 hour   Intake 2958 33 ml   Output 995 ml   Net 1963 33 ml       Current Weight: Weight - Scale: 89 2 kg (196 lb 10 4 oz)    Physical Exam  Constitutional:       Appearance: She is not toxic-appearing  HENT:      Mouth/Throat:      Pharynx: Oropharynx is clear  Neck:      Musculoskeletal: No muscular tenderness  Cardiovascular:      Rate and Rhythm: Normal rate and regular rhythm  Pulmonary:      Effort: Pulmonary effort is normal       Breath sounds: Normal breath sounds  Abdominal:      General: There is distension  Tenderness: There is abdominal tenderness  Musculoskeletal:         General: No tenderness or deformity  Lymphadenopathy:      Cervical: No cervical adenopathy  Skin:     General: Skin is warm and dry  Findings: No rash  Neurological:      Mental Status: She is alert and oriented to person, place, and time  Mental status is at baseline     Psychiatric:         Mood and Affect: Mood normal            Lab Results:    Results from last 7 days   Lab Units 08/10/20  0607   WBC Thousand/uL 5 61   HEMOGLOBIN g/dL 10 4*   HEMATOCRIT % 31 7*   PLATELETS Thousands/uL 238     Results from last 7 days   Lab Units 08/10/20  0607   POTASSIUM mmol/L 4 2   CHLORIDE mmol/L 110*   CO2 mmol/L 16*   BUN mg/dL 58*   CREATININE mg/dL 4 88*   CALCIUM mg/dL 7 3*

## 2020-08-10 NOTE — ANESTHESIA POSTPROCEDURE EVALUATION
Post-Op Assessment Note    CV Status:  Stable  Pain Score: 0    Pain management: adequate     Mental Status:  Sleepy   Hydration Status:  Euvolemic   PONV Controlled:  Controlled   Airway Patency:  Patent      Post Op Vitals Reviewed: Yes      Staff: CRNA   Comments: 137/74, 79, 16  95% 8 L FM  03 6        No complications documented      BP      Temp      Pulse     Resp      SpO2

## 2020-08-10 NOTE — ANESTHESIA PROCEDURE NOTES
Arterial Line Insertion  Performed by: Waldon Rinne, CRNA  Authorized by: Roberta Villeda MD   Patient identity confirmed: arm band  Preparation: Patient was prepped and draped in the usual sterile fashion    Indications: hemodynamic monitoring  Orientation:  Right  Location: radial artery  Procedure Details:  Needle gauge: 20    Post-procedure:  Post-procedure: dressing applied  Patient tolerance: Patient tolerated the procedure well with no immediate complications

## 2020-08-10 NOTE — ED RE-EVALUATION NOTE
Attempt at placement of 14 Khmer nasogastric tube resulted in epistaxis in the left nare  Bleeding controlled with direct pressure as well as packing with application of lidocaine/epinephrine  Attempted placement of 12 Khmer NG tube in right nostril which was aborted upon surgical evaluation as plan will be for OR       Malorie 8, DO 08/09/20 2126

## 2020-08-10 NOTE — SOCIAL WORK
The patient is a 30 day readmission with readmission risk score of 23  She has prior [facility] stay 7/15/20-7/19/20  Met briefly with patient and spoke to son, Grant Choudhary, [phone] by phone  The patient and her son, Dary De Guzman, live in a two floor home with 6-7 MARLON and bedroom and bathroom on second floor- 12 steps up  Dary De Guzman is disabled but able to provide limited help to patient with limited meals but he is unable to use the phone or communicate adequately  Grant Choudhary lives in [facility] and is here to stay with his brother while patient is hospitalized  Patient was independent with all care and drives  She has  A cane and walker which she does not need to use  The patient has no HHC and no history of MH and D&A treatment  Patient has an Advance Directive and son was asked to provide a copy  PCP is Dr Renu Perez  The patient has prescription coverage and uses [facility] [address]  Grant Choudhary is able to work from his lap top and can stay at patient's home this week  He and his sister have been discussion with the patient and possible future move to live near to him but patient has not yet made this decision  CM reviewed d/c planning process including the following: identifying help at home, patient preference for d/c planning needs, Discharge Lounge, [facility] Meds to Bed program, availability of treatment team to discuss questions or concerns patient and/or family may have regarding understanding medications and recognizing signs and symptoms once discharged  CM also encouraged patient to follow up with all recommended appointments after discharge  Patient advised of importance for patient and family to participate in managing patients medical well being  Patient/caregiver received discharge checklist  Content reviewed  Patient/caregiver encouraged to participate in discharge plan of care prior to discharge home

## 2020-08-10 NOTE — OP NOTE
OPERATIVE REPORT  PATIENT NAME: Leatha Tillman    :  [id number]  MRN: [id number]  Pt Location: BE OR ROOM 09    SURGERY DATE: 2020    Surgeon(s) and Role:     * Kaitlynn Bustillo,  - Primary     * Georgia Diaz MD - Ellyn Turpin MD - Assisting    Preop Diagnosis:  Small bowel obstruction ([address] ) [R54 164]    Post-Op Diagnosis Codes:     * Small bowel obstruction ([address] ) [Y49 621]    Procedure(s) (LRB):  LAPAROTOMY EXPLORATORY, PARASTOMAL HERNIA REPAIR WITH MESH (N/A)  LYSIS ADHESIONS (N/A)    Specimen(s):  * No specimens in log *    Estimated Blood Loss:   Minimal    Drains:  Closed/Suction Drain Left LLQ Bulb 19 Fr  (Active)   Number of days: 0       Urostomy Ileal conduit RUQ (Active)   Number of days: 1406       Anesthesia Type:   General    Operative Indications:  Small bowel obstruction [facility]) [K56 609]  76 yo female with hx of nonfunctional bladder s/p ileal conduit creation who presents with an incarcerated parastomal hernia with transition point noted on CT imaging  Operative Findings:  -Parastomal defect creating transition point with loop of incarcerated small bowel  Bowel was successfully reduced and appeared viable  Active peristasis visualized  Complications:   None    Procedure and Technique:  Patient identified in preop holding area using name, , MRN  Consent verified  Pt brought back to OR and placed in supine position  General endotracheal anesthesia administered  Prophylactic antibiotics given  Area was prepped and draped in the usual sterile fashion  Sequential compression stockings placed  Timeout given and all were in agreement  Using a 10 blade a midline laparotomy incision was made  Dissection was carried down to fascia using electrocautery  Fascia was grasped and peritoneum was incised the whole length of the incision while confirming safe entry into the peritoneum  Dilated loops of proximal small bowel were seen upon entry into the peritoneum    The bowel was run from the ligament of treitz to the ileal conduit and a 3 cm defect was noted just inferior and lateral to the ileal conduit  A collapsed segment and dilated segment were noted to be incarcerated in the defect  The bowel was gently manipulated and ultimately reduced  The bowel appeared dusky but viable and within seconds began to pink up and active peristasis was seen  The ileal conduit was now inspected and no abnormalities were seen  Steady urine output was noted throughout the case  Decision was made to repair the fascial defect primarily with 3-0 vicryl sutures in figure of eight fashion  We then placed Phasix Mesh in a Keyhole fashion to reinforce the repair using 3-0 PDS but ensuring that it did not fit too tightly around the ileal conduit  We then ran the bowel to assess viability and all the bowel was noted to be viable  The abdomen was irrigated with 1L warm saline  Fasia was then closed using 0-PDS and skin was closed with staples  An ileal conduit appliance was fashioned  Counts were correct and RF wanding was negative for retained RF detectable objects  Anesthesia was reversed  Patient tolerated the procedure well  Dr Toyin Powell was present for entire case                 I was present for the entire procedure and I was present for all critical portions of the procedure    Patient Disposition:  PACU     SIGNATURE: Ashley Bocanegra MD  DATE: August 10, 2020  TIME: 4:07 AM

## 2020-08-10 NOTE — ANESTHESIA PREPROCEDURE EVALUATION
Procedure:  LAPAROTOMY EXPLORATORY, PARASTOMAL HERNIA REPAIR, POSSIBLE BOWEL RESECTION, POSSIBLE RE-SITING OF ILEAL CONDUIT (N/A Abdomen)    Relevant Problems   CARDIO   (+) Chronic saddle pulmonary embolism (HCC)   (+) Hypertension      ENDO   (+) Hypothyroidism   (+) Secondary hyperparathyroidism of renal origin (HCC)      GI/HEPATIC   (+) Small bowel obstruction (HCC)      /RENAL   (+) Stage 4 chronic kidney disease (HCC)   (+) Stage 5 chronic kidney disease not on chronic dialysis (HCC)      HEMATOLOGY   (+) Anemia in CKD (chronic kidney disease)      NEURO/PSYCH   (+) Intraventricular hemorrhage (HCC)      Other   (+) 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)   (+) Bladder mass   (+) Polycythemia   (+) Polycythemia vera (HCC)   (+) Subdural hematoma, acute (HCC)   (+) Thoracic compression fracture (HCC)        Physical Exam    Airway    Mallampati score: II  TM Distance: >3 FB  Neck ROM: full     Dental   lower dentures and upper dentures,     Cardiovascular  Rhythm: regular, Rate: normal, Cardiovascular exam normal    Pulmonary  Pulmonary exam normal     Other Findings        Anesthesia Plan  ASA Score- 3 Emergent    Anesthesia Type- general with ASA Monitors  Additional Monitors: arterial line  Airway Plan: ETT  Plan Factors-Exercise tolerance (METS): <4 METS  Chart reviewed  EKG reviewed  Existing labs reviewed  Patient summary reviewed  Patient is not a current smoker  Patient did not smoke on day of surgery  Induction- intravenous and rapid sequence induction  Postoperative Plan- Plan for postoperative opioid use  Planned trial extubation    Informed Consent- Anesthetic plan and risks discussed with patient  I personally reviewed this patient with the CRNA  Discussed and agreed on the Anesthesia Plan with the CRNA  Reshma Ward

## 2020-08-10 NOTE — UTILIZATION REVIEW
Notification of Inpatient Admission/Inpatient Authorization Request   This is a Notification of Inpatient Admission for 5 Akiko Gary  Be advised that this patient was admitted to our facility under Inpatient Status  Contact Zheng Starr at [phone] for additional admission information  [facility] DEPT  DEDICATED -[phone]  Patient Name:   Marciano Saenz   YOB: 1945       [address]   [address]:   Cecile Soto  Tax ID: [id number]  NPI: [id number] Attending Provider/NPI:  Phone:  Address: Clarence Orlando [[id number]]  [phone]  Same as Facility   Place of Service Code: 24 Place of Service Name:  [address] [place] [address]   Start Date: 8/10/20 9607 Discharge Date & Time: No discharge date for patient encounter  Type of Admission: Inpatient Status Discharge Disposition (if discharged): Home/Self Care   Patient Diagnoses: Small bowel obstruction ([address] ) [K56 609]  Abdominal pain [R10 9]  Bandemia [D72 825]  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]  Acute kidney injury superimposed on chronic kidney disease ([address] ) [N17 9, N18 9]  Nausea and vomiting, intractability of vomiting not specified, unspecified vomiting type [R11 2]     Orders: Admission Orders (From admission, onward)     Ordered        08/10/20 0508  Inpatient Admission  Once                    Assigned Utilization Review Contact: Zheng Starr  Utilization ,   Network Utilization Review Department  Phone: [phone]; Fax [phone]   Email: [email or web address]   ATTENTION PAYERS: Please call the assigned Utilization  directly with any questions or concerns ALL voicemails in the department are confidential  Send all requests for admission clinical reviews, approved or denied determinations and any other requests to dedicated fax number belonging to the campus where the patient is receiving treatment

## 2020-08-10 NOTE — PROGRESS NOTES
Progress Note - General Surgery   Inocencio Hayes 77 y o  female MRN: [id number]  Unit/Bed#: [facility] 518-01 Encounter: [id number]    Assessment:  77 F with incarcerated parastomal hernia s/p ex-lap, reduction and parastomal hernia repair with Phasix mesh, LUANN secondary to obstructive uropathy    Plan:  NPO/NGT  IV fluids  Trend creatinine  F/u urine output  Pain control prn  Out of bed and ambulate  DVT prophylaxis    Subjective/Objective     Subjective: No acute events since surgery  Some abdominal pain, worse when coughing  Otherwise doing relatively well  Objective:    Blood pressure 115/68, pulse 86, temperature 98 1 °F (36 7 °C), temperature source Oral, resp  rate 16, height 5' (1 524 m), weight 89 2 kg (196 lb 10 4 oz), SpO2 100 %, not currently breastfeeding  ,Body mass index is 38 41 kg/m²        Intake/Output Summary (Last 24 hours) at 8/10/2020 0848  Last data filed at 8/10/2020 0600  Gross per 24 hour   Intake 2200 ml   Output 445 ml   Net 1755 ml       Invasive Devices     Central Venous Catheter Line            CVC Central Lines 08/10/20 Triple less than 1 day          Peripheral Intravenous Line            Peripheral IV 08/09/20 Right Antecubital less than 1 day    Peripheral IV 08/10/20 Left Hand less than 1 day          Arterial Line            Arterial Line 08/10/20 Right Radial less than 1 day          Drain            Urostomy Ileal conduit RUQ 1405 days    Closed/Suction Drain Left LLQ Bulb 19 Fr  less than 1 day    NG/OG/Enteral Tube Nasogastric Right nares less than 1 day                Physical Exam:   General: NAD, AAOx3  Eyes: PERRL  ENT: moist mucous membranes  Neck: supple  CV: RRR +S1/S2  Chest: respirations non-labored  Abdomen: soft, non-distended, mildly tender, stoma healthy and viable, dressings c/d/i  Extremities: atraumatic, no edema      Results from last 7 days   Lab Units 08/10/20  0607 08/09/20  1716   WBC Thousand/uL 5 61 11 21*   HEMOGLOBIN g/dL 10 4* 12 3   HEMATOCRIT % 31 7* 36 3   PLATELETS Thousands/uL 238 337     Results from last 7 days   Lab Units 08/10/20  0607 08/09/20  1716   POTASSIUM mmol/L 4 2 4 7   CHLORIDE mmol/L 110* 104   CO2 mmol/L 16* 20*   BUN mg/dL 58* 58*   CREATININE mg/dL 4 88* 5 17*   CALCIUM mg/dL 7 3* 9 2     Results from last 7 days   Lab Units 08/09/20  2144   INR  1 41*

## 2020-08-11 ENCOUNTER — APPOINTMENT (INPATIENT)
Dept: RADIOLOGY | Facility: HOSPITAL | Age: 75
DRG: 354 | End: 2020-08-11
Payer: COMMERCIAL

## 2020-08-11 LAB
ANION GAP SERPL CALCULATED.3IONS-SCNC: 10 MMOL/L (ref 4–13)
BUN SERPL-MCNC: 48 MG/DL (ref 5–25)
CALCIUM SERPL-MCNC: 7.4 MG/DL (ref 8.3–10.1)
CHLORIDE SERPL-SCNC: 103 MMOL/L (ref 100–108)
CO2 SERPL-SCNC: 27 MMOL/L (ref 21–32)
CREAT SERPL-MCNC: 4.14 MG/DL (ref 0.6–1.3)
ERYTHROCYTE [DISTWIDTH] IN BLOOD BY AUTOMATED COUNT: 14.4 % (ref 11.6–15.1)
GFR SERPL CREATININE-BSD FRML MDRD: 10 ML/MIN/1.73SQ M
GLUCOSE SERPL-MCNC: 105 MG/DL (ref 65–140)
GLUCOSE SERPL-MCNC: 109 MG/DL (ref 65–140)
GLUCOSE SERPL-MCNC: 111 MG/DL (ref 65–140)
GLUCOSE SERPL-MCNC: 116 MG/DL (ref 65–140)
GLUCOSE SERPL-MCNC: 119 MG/DL (ref 65–140)
HCT VFR BLD AUTO: 28.5 % (ref 34.8–46.1)
HGB BLD-MCNC: 9.2 G/DL (ref 11.5–15.4)
MCH RBC QN AUTO: 30.4 PG (ref 26.8–34.3)
MCHC RBC AUTO-ENTMCNC: 32.3 G/DL (ref 31.4–37.4)
MCV RBC AUTO: 94 FL (ref 82–98)
NRBC BLD AUTO-RTO: 0 /100 WBCS
PLATELET # BLD AUTO: 184 THOUSANDS/UL (ref 149–390)
PMV BLD AUTO: 10.5 FL (ref 8.9–12.7)
POTASSIUM SERPL-SCNC: 3.7 MMOL/L (ref 3.5–5.3)
RBC # BLD AUTO: 3.03 MILLION/UL (ref 3.81–5.12)
SODIUM SERPL-SCNC: 140 MMOL/L (ref 136–145)
WBC # BLD AUTO: 4.93 THOUSAND/UL (ref 4.31–10.16)

## 2020-08-11 PROCEDURE — 85027 COMPLETE CBC AUTOMATED: CPT | Performed by: PHYSICIAN ASSISTANT

## 2020-08-11 PROCEDURE — 99232 SBSQ HOSP IP/OBS MODERATE 35: CPT | Performed by: INTERNAL MEDICINE

## 2020-08-11 PROCEDURE — 85610 PROTHROMBIN TIME: CPT | Performed by: SURGERY

## 2020-08-11 PROCEDURE — 99232 SBSQ HOSP IP/OBS MODERATE 35: CPT | Performed by: PHYSICIAN ASSISTANT

## 2020-08-11 PROCEDURE — 80048 BASIC METABOLIC PNL TOTAL CA: CPT | Performed by: PHYSICIAN ASSISTANT

## 2020-08-11 PROCEDURE — 99024 POSTOP FOLLOW-UP VISIT: CPT | Performed by: SURGERY

## 2020-08-11 PROCEDURE — 85027 COMPLETE CBC AUTOMATED: CPT | Performed by: SURGERY

## 2020-08-11 PROCEDURE — 85730 THROMBOPLASTIN TIME PARTIAL: CPT | Performed by: SURGERY

## 2020-08-11 PROCEDURE — 82948 REAGENT STRIP/BLOOD GLUCOSE: CPT

## 2020-08-11 RX ORDER — SODIUM CHLORIDE, SODIUM GLUCONATE, SODIUM ACETATE, POTASSIUM CHLORIDE, MAGNESIUM CHLORIDE, SODIUM PHOSPHATE, DIBASIC, AND POTASSIUM PHOSPHATE .53; .5; .37; .037; .03; .012; .00082 G/100ML; G/100ML; G/100ML; G/100ML; G/100ML; G/100ML; G/100ML
75 INJECTION, SOLUTION INTRAVENOUS CONTINUOUS
Status: DISCONTINUED | OUTPATIENT
Start: 2020-08-11 | End: 2020-08-17

## 2020-08-11 RX ORDER — POTASSIUM CHLORIDE 14.9 MG/ML
20 INJECTION INTRAVENOUS ONCE
Status: COMPLETED | OUTPATIENT
Start: 2020-08-11 | End: 2020-08-11

## 2020-08-11 RX ORDER — HEPARIN SODIUM 1000 [USP'U]/ML
6800 INJECTION, SOLUTION INTRAVENOUS; SUBCUTANEOUS ONCE
Status: COMPLETED | OUTPATIENT
Start: 2020-08-11 | End: 2020-08-11

## 2020-08-11 RX ORDER — POTASSIUM CHLORIDE 14.9 MG/ML
20 INJECTION INTRAVENOUS ONCE
Status: DISCONTINUED | OUTPATIENT
Start: 2020-08-11 | End: 2020-08-11

## 2020-08-11 RX ORDER — HEPARIN SODIUM 10000 [USP'U]/100ML
3-30 INJECTION, SOLUTION INTRAVENOUS
Status: DISCONTINUED | OUTPATIENT
Start: 2020-08-11 | End: 2020-08-16

## 2020-08-11 RX ORDER — POTASSIUM CHLORIDE 20 MEQ/1
40 TABLET, EXTENDED RELEASE ORAL ONCE
Status: COMPLETED | OUTPATIENT
Start: 2020-08-11 | End: 2020-08-11

## 2020-08-11 RX ADMIN — HEPARIN SODIUM 5000 UNITS: 5000 INJECTION INTRAVENOUS; SUBCUTANEOUS at 05:41

## 2020-08-11 RX ADMIN — HYDROMORPHONE HYDROCHLORIDE 0.5 MG: 1 INJECTION, SOLUTION INTRAMUSCULAR; INTRAVENOUS; SUBCUTANEOUS at 16:55

## 2020-08-11 RX ADMIN — METOROPROLOL TARTRATE 2.5 MG: 5 INJECTION, SOLUTION INTRAVENOUS at 19:50

## 2020-08-11 RX ADMIN — HYDROMORPHONE HYDROCHLORIDE 0.2 MG: 1 INJECTION, SOLUTION INTRAMUSCULAR; INTRAVENOUS; SUBCUTANEOUS at 20:51

## 2020-08-11 RX ADMIN — HYDROMORPHONE HYDROCHLORIDE 0.5 MG: 1 INJECTION, SOLUTION INTRAMUSCULAR; INTRAVENOUS; SUBCUTANEOUS at 01:27

## 2020-08-11 RX ADMIN — HEPARIN SODIUM 5000 UNITS: 5000 INJECTION INTRAVENOUS; SUBCUTANEOUS at 14:07

## 2020-08-11 RX ADMIN — HYDROMORPHONE HYDROCHLORIDE 0.5 MG: 1 INJECTION, SOLUTION INTRAMUSCULAR; INTRAVENOUS; SUBCUTANEOUS at 05:41

## 2020-08-11 RX ADMIN — HYDROMORPHONE HYDROCHLORIDE 0.5 MG: 1 INJECTION, SOLUTION INTRAMUSCULAR; INTRAVENOUS; SUBCUTANEOUS at 11:29

## 2020-08-11 RX ADMIN — HEPARIN SODIUM AND DEXTROSE 18 UNITS/KG/HR: 10000; 5 INJECTION INTRAVENOUS at 23:42

## 2020-08-11 RX ADMIN — METOROPROLOL TARTRATE 2.5 MG: 5 INJECTION, SOLUTION INTRAVENOUS at 03:24

## 2020-08-11 RX ADMIN — POTASSIUM CHLORIDE 40 MEQ: 1500 TABLET, EXTENDED RELEASE ORAL at 19:50

## 2020-08-11 RX ADMIN — HYDROMORPHONE HYDROCHLORIDE 0.5 MG: 1 INJECTION, SOLUTION INTRAMUSCULAR; INTRAVENOUS; SUBCUTANEOUS at 07:22

## 2020-08-11 RX ADMIN — HEPARIN SODIUM 6800 UNITS: 1000 INJECTION INTRAVENOUS; SUBCUTANEOUS at 23:43

## 2020-08-11 RX ADMIN — HEPARIN SODIUM 5000 UNITS: 5000 INJECTION INTRAVENOUS; SUBCUTANEOUS at 21:03

## 2020-08-11 RX ADMIN — SODIUM CHLORIDE, SODIUM GLUCONATE, SODIUM ACETATE, POTASSIUM CHLORIDE AND MAGNESIUM CHLORIDE 75 ML/HR: 526; 502; 368; 37; 30 INJECTION, SOLUTION INTRAVENOUS at 14:07

## 2020-08-11 RX ADMIN — METOROPROLOL TARTRATE 2.5 MG: 5 INJECTION, SOLUTION INTRAVENOUS at 11:30

## 2020-08-11 RX ADMIN — SODIUM BICARBONATE 100 ML/HR: 84 INJECTION, SOLUTION INTRAVENOUS at 00:41

## 2020-08-11 RX ADMIN — POTASSIUM CHLORIDE 20 MEQ: 14.9 INJECTION, SOLUTION INTRAVENOUS at 08:49

## 2020-08-11 NOTE — UTILIZATION REVIEW
Initial Clinical Review    Admission: Date/Time/Statement:   Admission Orders (From admission, onward)     Ordered        08/10/20 0508  Inpatient Admission  Once                   Orders Placed This Encounter   Procedures    Inpatient Admission     Standing Status:   Standing     Number of Occurrences:   1     Order Specific Question:   Admitting Physician     Answer:   Tristen Riley [[id number]]     Order Specific Question:   Level of Care     Answer:   Level 2 Stepdown / HOT [14]     Order Specific Question:   Estimated length of stay     Answer:   More than 2 Midnights     Order Specific Question:   Certification     Answer:   I certify that inpatient services are medically necessary for this patient for a duration of greater than two midnights  See H&P and MD Progress Notes for additional information about the patient's course of treatment  ED Arrival Information     Expected Arrival Acuity Means of Arrival Escorted By Service Admission Type    - 8/9/2020 17:03 Urgent Ambulance [facility] EMS Surgery-General Urgent    Arrival Complaint    abdominal pain        Chief Complaint   Patient presents with    Abdominal Pain     Pt has c/o lower abdominal pain and vomiting for 2 days     Assessment/Plan: 77year old female, presented to the ED @ [facility] from home, via EMS  Admitted as Inpatient due to SBO secondary to incarcerated parastomal hernia at ileal conduit site  C/O abd started 1 day ago   + for activity change, abd distention, abd pain, constipation, Nausea and vomiting, difficulty urinating  To OR for exploratory laparotomy, parastomal hernia repair, possible bowel resection, possible re-siting of ileal conduit  NPO   NGT  IV flds  Blood on hold  Si Shannan for eliquis reversal   Consult Uro        SURGERY DATE: 8/9/2020  Procedure(s) (LRB):  LAPAROTOMY EXPLORATORY, PARASTOMAL HERNIA REPAIR WITH MESH (N/A)  LYSIS ADHESIONS (N/A)  Anesthesia Type:   General  Operative Findings: Parastomal defect creating transition point with loop of incarcerated small bowel  Bowel was successfully reduced and appeared viable  Active peristasis visualized  08/10/2020  Consult Uro: Small bowel obstruction with ileal conduit hernia   doing well postoperatively  ileal conduit stoma is pink and viable urine output  08/10/2020  Consult Nephrology:  Acute kidney injury, most likely secondary to hemodynamic fluctuations as well as obstructive uropathy given hydronephrosis  Chronic kidney disease previous baseline creatinine in the mid 2s to low 3's  Metabolic acidosis secondary to ongoing renal failure, change fluids to sodium bicarbonate  Overall renal function slightly improved at 4 8  Change IV fluids to sodium bicarbonate given metabolic acidosis, predominantly non gap  Continue monitor electrolytes and renal function closely        ED Triage Vitals [08/09/20 1705]   Temperature Pulse Respirations Blood Pressure SpO2   98 1 °F (36 7 °C) 87 18 135/76 95 %      Temp Source Heart Rate Source Patient Position - Orthostatic VS BP Location FiO2 (%)   Oral Monitor Sitting Left arm --      Pain Score       8          Wt Readings from Last 1 Encounters:   08/10/20 89 2 kg (196 lb 10 4 oz)     Additional Vital Signs:   Date/Time   Temp   Pulse   Resp   BP   MAP (mmHg)   Arterial Line BP   MAP   SpO2   O2 Flow Rate (L/min)   O2 Device   Cardiac (WDL)   Patient Position - Orthostatic VS    08/11/20 1127   99 °F (37 2 °C)   110Abnormal     20   161/95            95 %      None (Room air)      Sitting    08/11/20 0807   98 1 °F (36 7 °C)   101   20   149/84            95 %      None (Room air)      Lying    08/11/20 0300                              None (Room air)          08/11/20 0243   98 8 °F (37 1 °C)   97   18   137/77            97 %                08/11/20 0000                              None (Room air)          08/10/20 2300   97 8 °F (36 6 °C)   98   18 142/74            92 %                08/10/20 2000                              None (Room air)          08/10/20 1901   98 °F (36 7 °C)   68   20   91/56            93 %                08/10/20 1702                              None (Room air)          08/10/20 1542      94   14   136/79   96   138/72   96 mmHg   94 %                08/10/20 1510   97 5 °F (36 4 °C)                                     08/10/20 1442      86   15   134/76   107   142/68   94 mmHg   95 %                08/10/20 1400      86   17               95 %                08/10/20 1342      90   18   152/92   116   152/72   102 mmHg   97 %                08/10/20 1300      90   19         148/74   102 mmHg   97 %                08/10/20 1242      86   17   160/88   123   162/76   106 mmHg   95 %                08/10/20 1200      84   15         156/78   106 mmHg   92 %                08/10/20 1142      84   16   150/111Abnormal     135   132/80   104 mmHg   92 %                08/10/20 1100   99 9 °F (37 7 °C)   90   17   122/76      148/74   100 mmHg   92 %                08/10/20 1042      90   16   122/76   101   156/76   102 mmHg   91 %                08/10/20 1000      90   16         154/76   102 mmHg   91 %                08/10/20 0942      92   18   141/78   106   150/74   100 mmHg   91 %                08/10/20 0900      94   18         146/88   112 mmHg   95 %                08/10/20 0842      90   19   150/89   114   164/80   110 mmHg   97 %                08/10/20 0800      88   25Abnormal           140/102   118 mmHg   97 %   2 L/min   Nasal cannula          08/10/20 0700   98 1 °F (36 7 °C)   86   16         166/76   108 mmHg   100 %                08/10/20 0600      86   23Abnormal           154/76   104 mmHg   98 %                08/10/20 0549   97 7 °F (36 5 °C)                                   08/10/20 0500      82   20   115/68      138/68   92 mmHg   96 %   2 L/min   Nasal cannula          08/10/20 0445      82   20   123/72      140/68   92 mmHg   96 %   2 L/min   Nasal cannula   WDL       08/10/20 0430      80   19   122/76      134/66   92 mmHg   97 %   2 L/min   Nasal cannula          08/10/20 0415      80   20   131/75      133/65      94 %   2 L/min   Nasal cannula          08/10/20 0400   97 1 °F (36 2 °C)Abnormal     79   20         129/84      93 %   6 L/min   Simple mask   WDL       08/09/20 1930      77      160/91   113         96 %                08/09/20 1900      77      170/94   125         94 %                08/09/20 1830      77   18   166/93   122         95 %      None (Room air)      Sitting      Date and Time  Eye Opening  Best Verbal Response  Best Motor Response  Sullivan Coma Scale Score    08/11/20 1200  4  5  6  15    08/11/20 0800  4  5  6  15    08/11/20 0300  4  5  6  15    08/11/20 0000  4  5  6  15    08/10/20 2000  4  5  6  15    08/10/20 1702  4  5  6  15    08/10/20 1600  4  5  6  15    08/10/20 1200  4  5  6  15    08/10/20 0800  4  5  6  15    08/10/20 0530  4  5  6  15    08/10/20 0445  4  5  6  15    08/09/20 1705  4  5  6  15      08/10/2020 @ 0808  Chest X:  Left jugular catheter in upper SVC with no pneumothorax  08/09/2020 @ 2017  CT abd/pel:  Small bowel obstruction leading up to 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  Abrupt narrowing involving both the afferent and efferent limbs of herniated bowel at point of entry and exit from hernia sac is concerning for developing closed-loop obstruction  Bilateral hydroureteronephrosis with distention of ileal conduit to the level of entry point at the hernia sac       Pertinent Labs/Diagnostic Test Results:   Results from last 7 days   Lab Units 08/09/20 2142   SARS-COV-2  Negative     Results from last 7 days   Lab Units 08/11/20  0436 08/10/20  0607 08/09/20  1716   WBC Thousand/uL 4 93 5 61 11 21*   HEMOGLOBIN g/dL 9 2* 10 4* 12 3   HEMATOCRIT % 28 5* 31 7* 36 3   PLATELETS Thousands/uL 184 238 337   BANDS PCT %  --   --  10*     Results from last 7 days   Lab Units 08/11/20  0436 08/10/20  0607 08/09/20  1716   SODIUM mmol/L 140 136 135*   POTASSIUM mmol/L 3 7 4 2 4 7   CHLORIDE mmol/L 103 110* 104   CO2 mmol/L 27 16* 20*   ANION GAP mmol/L 10 10 11   BUN mg/dL 48* 58* 58*   CREATININE mg/dL 4 14* 4 88* 5 17*   EGFR ml/min/1 73sq m 10 8 8   CALCIUM mg/dL 7 4* 7 3* 9 2   MAGNESIUM mg/dL  --  1 6  --    PHOSPHORUS mg/dL  --  4 4*  --      Results from last 7 days   Lab Units 08/09/20  1716   AST U/L 16   ALT U/L 10*   ALK PHOS U/L 67   TOTAL PROTEIN g/dL 7 8   ALBUMIN g/dL 3 6   TOTAL BILIRUBIN mg/dL 0 75     Results from last 7 days   Lab Units 08/11/20  1147 08/11/20  0545 08/10/20  2358 08/10/20  1750 08/10/20  1433 08/10/20  0618   POC GLUCOSE mg/dl 109 116 111 115 138 190*     Results from last 7 days   Lab Units 08/11/20  0436 08/10/20  0607 08/09/20  1716   GLUCOSE RANDOM mg/dL 119 181* 150*     Results from last 7 days   Lab Units 08/09/20  2144   PROTIME seconds 17 2*   INR  1 41*     Results from last 7 days   Lab Units 08/09/20  2145   LACTIC ACID mmol/L 1 5     Results from last 7 days   Lab Units 08/09/20  1716   LIPASE u/L 205     ED Treatment:   Medication Administration from 08/09/2020 1703 to 08/09/2020 2222       Date/Time Order Dose Route Action     08/09/2020 1810 lactated ringers bolus 500 mL 500 mL Intravenous New Bag     08/09/2020 1807 fentanyl citrate (PF) 100 MCG/2ML 50 mcg 50 mcg Intravenous Given     08/09/2020 1807 ondansetron (ZOFRAN) injection 4 mg 4 mg Intravenous Given     08/09/2020 1811 iohexol (OMNIPAQUE) 240 MG/ML solution 50 mL 50 mL Oral Given     08/09/2020 2147 ondansetron (ZOFRAN) injection 4 mg 4 mg Intravenous Given     08/09/2020 2043 Lidocaine Viscous HCl (XYLOCAINE) 2 % mucosal solution 15 mL 15 mL Swish & Spit Given        Past Medical History:   Diagnosis Date    Anxiety     Chronic kidney disease (CKD), stage IV (severe) (HCC)     stage IV    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    Compression fracture of cervical spine (HCC)     Hydronephrosis     Hypertension     Hypothyroid     Incontinence     Lung mass     Improving on PET/CT 1/2016    [facility]cy[facility]mia vera [facility] )     Pulmonary embolism ([facility] ) 2014    Urinary tract infection      Present on Admission:   Small bowel obstruction (HCC)    Admitting Diagnosis: Small bowel obstruction (HCC) [K56 609]  Abdominal pain [R10 9]  Bandemia [D72 825]  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]  Acute kidney injury superimposed on chronic kidney disease (HCC) [N17 9, N18 9]  Nausea and vomiting, intractability of vomiting not specified, unspecified vomiting type [R11 2]  Age/Sex: 77 y o  female  Admission Orders:  Scheduled Medications:  heparin (porcine), 5,000 Units, Subcutaneous, Q8H [facility]  metoprolol, 2 5 mg, Intravenous, Q8H  potassium chloride, 20 mEq, Intravenous, Once  potassium chloride, 20 mEq, Intravenous, Once  Kcentra (PROTHROMBIN), 35 Units/kg, Intravenous, Once    Continuous IV Infusions:  sodium bicarbonate infusion, 100 mL/hr, Intravenous, Continuous    PRN Meds:  HYDROmorphone, 0 2 mg, Intravenous, Q3H PRN  HYDROmorphone, 0 5 mg, Intravenous, Q3H PRN  HYDROmorphone, 0 5 mg, Intravenous, Q3H PRN  ondansetron, 4 mg, Intravenous, Q4H PRN    NPO  NG Tube to low cont suction  Abilio SCDs  Consult PT/OT  IP CONSULT TO UROLOGY  IP CONSULT TO WOUND CARE PROVIDER  IP CONSULT TO NEPHROLOGY    Network Utilization Review Department  [email or web address]  ATTENTION: Please call with any questions or concerns to [phone] and carefully listen to the prompts so that you are directed to the right person   All voicemails are confidential   Sudie Crigler all requests for admission clinical reviews, approved or denied determinations and any other requests to dedicated fax number below belonging to the campus where the patient is receiving treatment   List of dedicated fax numbers for the Facilities:  [address] DENIALS (Administrative/Medical Necessity) [phone]   [address]  (Maternity/NICU/Pediatrics) [phone]   Gisela Villalobos [phone]   Chayo Garcia [phone]   Nabor Morales [phone]   Dudley Mcnamara [phone]   [address] [place] [address] [phone]   [facility]  [phone]   [address] [facility]  [address] [facility] [address] [facility] [phone]

## 2020-08-11 NOTE — UTILIZATION REVIEW
Notification of Inpatient Admission/Inpatient Authorization Request   This is a Notification of Inpatient Admission for 5 Akiko Bloomace  Be advised that this patient was admitted to our facility under Inpatient Status  Contact Tammy Yin at [phone] for additional admission information  [facility]  DEDICATED -[phone]  Patient Name:   Amos Fernandez   YOB: 1945       [address]   [address]:   Cecile Soto  Tax ID: [id number]  NPI: [id number] Attending Provider/NPI:  Phone:  Address: Karan Vito [[id number]]  [phone]  Same as Facility   Place of Service Code: 24 Place of Service Name:  [address] [place] [address]   Start Date: 8/10/20 3941 Discharge Date & Time: No discharge date for patient encounter  Type of Admission: Inpatient Status Discharge Disposition (if discharged): Home/Self Care   Patient Diagnoses: Small bowel obstruction ([address] ) [K56 609]  Abdominal pain [R10 9]  Bandemia [D72 825]  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]  Acute kidney injury superimposed on chronic kidney disease ([address] ) [N17 9, N18 9]  Nausea and vomiting, intractability of vomiting not specified, unspecified vomiting type [R11 2]     Orders: Admission Orders (From admission, onward)     Ordered        08/10/20 0508  Inpatient Admission  Once                    Assigned Utilization Review Contact: Tammy Yin  Utilization ,   Network Utilization Review Department  Phone: [phone]; Fax [phone]   Email: [email or web address]   ATTENTION PAYERS: Please call the assigned Utilization  directly with any questions or concerns ALL voicemails in the department are confidential  Send all requests for admission clinical reviews, approved or denied determinations and any other requests to dedicated fax number belonging to the campus where the patient is receiving treatment

## 2020-08-11 NOTE — RESTORATIVE TECHNICIAN NOTE
Restorative Specialist Mobility Note       Activity: Stand at bedside, Turn, Chair     Assistive Device: Front wheel walker        Repositioned: Sitting, Up in chair, Other (Comment)(Chair Alarm)

## 2020-08-11 NOTE — PLAN OF CARE
Problem: Potential for Falls  Goal: Patient will remain free of falls  Description: INTERVENTIONS:  - Assess patient frequently for physical needs  -  Identify cognitive and physical deficits and behaviors that affect risk of falls    -  [place] fall precautions as indicated by assessment   - Educate patient/family on patient safety including physical limitations  - Instruct patient to call for assistance with activity based on assessment  - Modify environment to reduce risk of injury  - Consider OT/PT consult to assist with strengthening/mobility  Outcome: Progressing     Problem: Prexisting or High Potential for Compromised Skin Integrity  Goal: Skin integrity is maintained or improved  Description: INTERVENTIONS:  - Identify patients at risk for skin breakdown  - Assess and monitor skin integrity  - Assess and monitor nutrition and hydration status  - Monitor labs   - Assess for incontinence   - Turn and reposition patient  - Assist with mobility/ambulation  - Relieve pressure over bony prominences  - Avoid friction and shearing  - Provide appropriate hygiene as needed including keeping skin clean and dry  - Evaluate need for skin moisturizer/barrier cream  - Collaborate with interdisciplinary team   - Patient/family teaching  - Consider wound care consult   Outcome: Progressing

## 2020-08-11 NOTE — CONSULTS
Consult Note - Wound   Mauricio Beach 77 y o  female MRN: [id number]  Unit/Bed#: [facility] 847-29 Encounter: [id number]      History and Present Illness: Patient is seen for wound care consult   The patient is out of bed in the chair today   Patient is a 77year old female admitted with a incarcerated ileal conduit parastomal hernia causing a Small bowel obstruction   Revision of the ileal conduit stoma secondary to small bowel obstruction and parastomal henia repair  The stoma is pink moist and viable well budded   Urostomy bag in place hooked to a camp bag   The patient has a NG tube at this time   She has a ileal conduit that she has had for 4 years and she cares for the ileal conduit independently   She does not want to stand for the assessment of the buttocks sacral area   Continent of bowel and bladder   Assessment Findings:   1  Bilateral heels dry and intact   2  Sacral - patient not wanting to stand for a assessment  Rn reports that it is fine   3  Right abdominal area with shingles   Noted 2 areas that are open partial thickness slight drainage   Orders are placed             Skin care plans:  1-Hydraguard to bilateral sacrum, buttock and heels BID and PRN  2-Elevate heels to offload pressure  3-Ehob cushion in chair when out of bed  4-Moisturize skin daily with skin nourishing cream   5-Turn/reposition q2h or when medically stable for pressure re-distribution on skin  6  Right abdominal shingles - cleanse with soap and water then pat dry apply adaptic then maxorb and then a ABD change every other day           Vitals: Blood pressure 153/93, pulse (!) 106, temperature 99 1 °F (37 3 °C), temperature source Oral, resp  rate 18, height 5' (1 524 m), weight 89 2 kg (196 lb 10 4 oz), SpO2 95 %, not currently breastfeeding  ,Body mass index is 38 41 kg/m²        Wound 08/10/20 Dermatologic/Rash Abdomen N/A (Active)   Wound Image   08/10/20 1015   Wound Description Clean fragile pink  08/11/20 1200 Fina-wound Assessment Clean dry intact  08/11/20 1200   Wound Length (cm) 2 5 cm 08/10/20 1015   Wound Width (cm) 3 cm 08/10/20 1015   Wound Depth (cm) 0 1 cm 08/10/20 1015   Wound Surface Area (cm^2) 7 5 cm^2 08/10/20 1015   Wound Volume (cm^3) 0 75 cm^3 08/10/20 1015   Calculated Wound Volume (cm^3) 0 75 cm^3 08/10/20 1015   Closure  08/11/20 1200   Drainage Amount Small 08/10/20 1600   Drainage Description Serosanguineous 08/10/20 1015   Non-staged Wound Description Partial thickness 08/10/20 1015   Treatments Cleansed 08/10/20 1015   Dressing Adaptic maxorb 08/10/20 1600   Patient Tolerance Tolerated well 08/10/20 2000   Dressing Status Clean;Dry; Intact 08/11/20 1200     Wound care will follow call with questions or concerns     Merissa Ritter RN BSN CWOCN

## 2020-08-11 NOTE — PROGRESS NOTES
Progress Note - Pam Guzman 77 y o  female MRN: [id number]    Unit/Bed#: [facility] 507-01 Encounter: [id number]      Assessment:  77 F with incarcerated parastomal hernia s/p ex-lap, reduction and parastomal hernia repair with Phasix mesh, LUANN secondary to obstructive uropathy    Vss  Afebrile  Abdomen is soft/ mild tenderness/ non distended  JOSE ANTONIO drain with 165 serosang output  NGT w 600 cc output  Cr: 4 8- 4 1  WBC: 5 6- 4 9    Plan:  Continue npo  Continue bicarb gtt, appreciate nephro recs  Continue sqh, dvt ppx  Maintain JOSE ANTONIO drain  Ambulate  Daily pt/ot    Subjective:   Feeling well  Complaints of abdominal pain this morning  Denied any chest pain or shortness of breath  Denied any nausea or vomiting  Objective:     Vitals: Blood pressure 137/77, pulse 97, temperature 98 8 °F (37 1 °C), resp  rate 18, height 5' (1 524 m), weight 89 2 kg (196 lb 10 4 oz), SpO2 97 %, not currently breastfeeding  ,Body mass index is 38 41 kg/m²  Intake/Output Summary (Last 24 hours) at 8/11/2020 0616  Last data filed at 8/11/2020 0359  Gross per 24 hour   Intake 2078 33 ml   Output 2050 ml   Net 28 33 ml       Physical Exam  General: NAD  HEENT: NC/AT  MMM  Cv: RRR  Lungs: normal effort  Ab: Soft, moderately tender/ non distended  JOSE ANTONIO in place  serosang output     Ex: no CCE  Neuro: AAOx3    Scheduled Meds:  Current Facility-Administered Medications   Medication Dose Route Frequency Provider Last Rate    heparin (porcine)  5,000 Units Subcutaneous [facility] Elian Mccrary MD      HYDROmorphone  0 2 mg Intravenous Q3H PRN Elian Mccrary MD      HYDROmorphone  0 5 mg Intravenous Q3H PRN Elian Mccrary MD      HYDROmorphone  0 5 mg Intravenous Q3H PRN Elian Mccrary MD      metoprolol  2 5 mg Intravenous Karel Bernheim, MD      ondansetron  4 mg Intravenous Q4H PRN Elian Mccrary MD      Kcentra (PROTHROMBIN)  35 Units/kg Intravenous Once Elian Mccrary MD      sodium bicarbonate infusion  100 mL/hr Intravenous Continuous Marlise Alexis Swift  mL/hr (08/11/20 0041)     Continuous Infusions:sodium bicarbonate infusion, 100 mL/hr, Last Rate: 100 mL/hr (08/11/20 0041)      PRN Meds:  HYDROmorphone    HYDROmorphone    HYDROmorphone    ondansetron      Invasive Devices     Central Venous Catheter Line            CVC Central Lines 08/10/20 Triple 1 day          Peripheral Intravenous Line            Peripheral IV 08/09/20 Right Antecubital 1 day    Peripheral IV 08/10/20 Left Hand 1 day          Drain            Urostomy Ileal conduit RUQ 1406 days    Closed/Suction Drain Left LLQ Bulb 19 Fr  1 day    NG/OG/Enteral Tube Nasogastric Right nares 1 day                Lab, Imaging and other studies: I have personally reviewed pertinent reports      VTE Pharmacologic Prophylaxis: Heparin  VTE Mechanical Prophylaxis: sequential compression device

## 2020-08-11 NOTE — QUICK NOTE
Nurse-Patient-Provider rounds were completed with the patient's nurse today, Radha Acosta  We discussed the plan is to   - IVF per Nephrology  - continue JOSE ANTONIO  - continue NG tube  - PT/OT  - discontinue central line    We reviewed all of the invasive devices/lines/telemetry orders  - central line  - JOSE ANTONIO  - NG tube    DVT prophylaxis:  - heparin    Diet:  - NPO    Pain Assessment / Plan:  - Continue current pain management regimen    Mobility Assessment / Plan:  - Activity as tolerated          Lori Gan PA-C

## 2020-08-11 NOTE — DISCHARGE INSTR - OTHER ORDERS
Skin care plans:  1-Hydraguard to bilateral sacrum, buttock and heels BID and PRN  2-Elevate heels to offload pressure  3-Ehob cushion in chair when out of bed  4-Moisturize skin daily with skin nourishing cream   5-Turn/reposition q2h or when medically stable for pressure re-distribution on skin     6  Right abdominal shingles - cleanse with soap and water then pat dry apply adaptic then maxorb and then a ABD change every other day

## 2020-08-11 NOTE — PROGRESS NOTES
UROLOGY PROGRESS NOTE   Patient Identifiers: Leah Pettit (MRN [id number])  Date of Service: 8/11/2020    Subjective:    Awake and alert  NG tube in place  Creatinine 4 14  WBC 4 93   H&H 9 2 and 28  5  Vince Dia Patient has  complaints of Postoperative pain  Bothered by the NG tube         Objective:     VITALS:    Vitals:    08/11/20 0807   BP: 149/84   Pulse: 101   Resp: 20   Temp: 98 1 °F (36 7 °C)   SpO2: 95%           LABS:  Lab Results   Component Value Date    HGB 9 2 (L) 08/11/2020    HCT 28 5 (L) 08/11/2020    WBC 4 93 08/11/2020     08/11/2020   ]    Lab Results   Component Value Date     12/17/2015    K 3 7 08/11/2020     08/11/2020    CO2 27 08/11/2020    BUN 48 (H) 08/11/2020    CREATININE 4 14 (H) 08/11/2020    CALCIUM 7 4 (L) 08/11/2020    GLUCOSE 138 10/04/2016   ]        INPATIENT MEDS:    Current Facility-Administered Medications:     heparin (porcine) subcutaneous injection 5,000 Units, 5,000 Units, Subcutaneous, Q8H [address], 5,000 Units at 08/11/20 0541 **AND** [CANCELED] Platelet count, , , Once, Susan Hearn MD    HYDROmorphone (DILAUDID) injection 0 2 mg, 0 2 mg, Intravenous, Q3H PRN, Bebe Riley MD, 0 2 mg at 08/10/20 1304    HYDROmorphone (DILAUDID) injection 0 5 mg, 0 5 mg, Intravenous, Q3H PRN, Bebe Riley MD, 0 5 mg at 08/11/20 0541    HYDROmorphone (DILAUDID) injection 0 5 mg, 0 5 mg, Intravenous, Q3H PRN, Bebe Riley MD, 0 5 mg at 08/11/20 1572    metoprolol (LOPRESSOR) injection 2 5 mg, 2 5 mg, Intravenous, Q8H, Bebe Riley MD, 2 5 mg at 08/11/20 0324    ondansetron (ZOFRAN) injection 4 mg, 4 mg, Intravenous, Q4H PRN, Bebe Riley MD    potassium chloride 20 mEq IVPB (premix), 20 mEq, Intravenous, Once, Morgan Sánchez MD, Last Rate: 50 mL/hr at 08/11/20 0849, 20 mEq at 08/11/20 0849    potassium chloride 20 mEq IVPB (premix), 20 mEq, Intravenous, Once, Morgan Sánchez MD    potassium chloride 20 mEq IVPB (premix), 20 mEq, Intravenous, Once, Sunny Orona MD    prothrombin complex conc human [facility] [place]) 3,000 Units, 35 Units/kg, Intravenous, Once, Elo Brown MD    sodium bicarbonate 150 mEq in dextrose 5 % 1,000 mL infusion, 100 mL/hr, Intravenous, Continuous, Therese Swift DO, Last Rate: 100 mL/hr at 08/11/20 0800, 100 mL/hr at 08/11/20 0800      Physical Exam:   /84 (BP Location: Left arm)   Pulse 101   Temp 98 1 °F (36 7 °C) (Oral)   Resp 20   Ht 5' (1 524 m)   Wt 89 2 kg (196 lb 10 4 oz)   SpO2 95%   BMI 38 41 kg/m²   GEN: no acute distress    RESP: breathing comfortably with no accessory muscle use    ABD: soft, appropriately tender to palpation, non-distended stoma is pink and viable  INCISION: Dressing remains in place   EXT: no significant peripheral edema     ILEAL CONDUIT: in place draining clear yellow urine  no clots and       RADIOLOGY:   None     Assessment:   1  Small-bowel obstruction  2  Peristomal hernia at ileal conduit  3  POD#1 exploratory laparotomy, Re seating  of the ileal conduit with reduction of peristomal hernia   4  Acute kidney injury  5   Chronic kidney disease    Plan:   -moved out of critical care  -ileal conduit stoma is pink and viable with good urine output  -continue management per surgery service  -urology will continue to follow

## 2020-08-11 NOTE — PROGRESS NOTES
NEPHROLOGY PROGRESS NOTE   Amos Fernandez 77 y o  female MRN: [id number]  Unit/Bed#: [facility] 507-01 Encounter: [id number]  Reason for Consult:  Acute kidney injury    Assessment/Plan:  1  Acute kidney injury, most likely secondary to hemodynamic fluctuations as well as obstructive uropathy given hydronephrosis  2  Chronic kidney disease previous baseline creatinine in the mid 2s to low 3's, follows with Dr Kathleen Guzman  3  Metabolic acidosis secondary to ongoing renal failure, overall has resolved  4  Bilateral hydronephrosis to the level of the ileal conduit, history cystectomy with ileal conduit  5  Postoperative incarcerated ileal conduit parastomal hernia status post ex lap and parastomal hernia repair  6  Postoperative anemia, continue monitor closely    PLAN:  · Overall renal function has continued to improve  · Continue with IV fluids, change to Isolyte  · Blood pressure volume status appears stable  · Ongoing potassium replacement currently 20 x 3 doses    SUBJECTIVE:  Seen examined  Patient complaining of significant abdominal discomfort  NG tube remains in place  No active chest pain or shortness of breath  Review of Systems    OBJECTIVE:  Current Weight: Weight - Scale: 89 2 kg (196 lb 10 4 oz)  Vitals:    08/10/20 2300 08/11/20 0243 08/11/20 0807 08/11/20 1127   BP: 142/74 137/77 149/84 161/95   BP Location:   Left arm    Pulse: 98 97 101 (!) 110   Resp: 18 18 20 20   Temp: 97 8 °F (36 6 °C) 98 8 °F (37 1 °C) 98 1 °F (36 7 °C) 99 °F (37 2 °C)   TempSrc: Oral  Oral Oral   SpO2: 92% 97% 95% 95%   Weight:       Height:           Intake/Output Summary (Last 24 hours) at 8/11/2020 1339  Last data filed at 8/11/2020 1200  Gross per 24 hour   Intake 2220 ml   Output 1740 ml   Net 480 ml       Physical Exam  HENT:      Head: Normocephalic and atraumatic  Eyes:      General: No scleral icterus  Cardiovascular:      Rate and Rhythm: Normal rate and regular rhythm     Pulmonary:      Effort: Pulmonary effort is normal       Breath sounds: Normal breath sounds  Abdominal:      General: There is distension  Tenderness: There is abdominal tenderness  Musculoskeletal:      Right lower leg: No edema  Left lower leg: No edema  Skin:     General: Skin is warm and dry  Neurological:      Mental Status: She is alert and oriented to person, place, and time           Medications:    Current Facility-Administered Medications:     heparin (porcine) subcutaneous injection 5,000 Units, 5,000 Units, Subcutaneous, Q8H [address], 5,000 Units at 08/11/20 0541 **AND** [CANCELED] Platelet count, , , Once, Alexander Saba MD    HYDROmorphone (DILAUDID) injection 0 2 mg, 0 2 mg, Intravenous, Q3H PRN, Jessica Vasquez MD, 0 2 mg at 08/10/20 1304    HYDROmorphone (DILAUDID) injection 0 5 mg, 0 5 mg, Intravenous, Q3H PRN, Jessica Vasquez MD, 0 5 mg at 08/11/20 1129    HYDROmorphone (DILAUDID) injection 0 5 mg, 0 5 mg, Intravenous, Q3H PRN, Jessica Vasquez MD, 0 5 mg at 08/11/20 1803    metoprolol (LOPRESSOR) injection 2 5 mg, 2 5 mg, Intravenous, Q8H, Jessica Vasquez MD, 2 5 mg at 08/11/20 1130    multi-electrolyte (PLASMALYTE-A/ISOLYTE-S PH 7 4) IV solution, 75 mL/hr, Intravenous, Continuous, Young Swift DO    ondansetron [facility]) injection 4 mg, 4 mg, Intravenous, Q4H PRN, Jessica Vasquez MD    potassium chloride 20 mEq IVPB (premix), 20 mEq, Intravenous, Once, Oleg Vasquez MD    potassium chloride 20 mEq IVPB (premix), 20 mEq, Intravenous, Once, Oleg Vasquez MD    prothrombin complex conc human (KCENTRA) 3,000 Units, 35 Units/kg, Intravenous, Once, Jessica Vasquez MD    Laboratory Results:  Results from last 7 days   Lab Units 08/11/20  0436 08/10/20  0607 08/09/20  1716   WBC Thousand/uL 4 93 5 61 11 21*   HEMOGLOBIN g/dL 9 2* 10 4* 12 3   HEMATOCRIT % 28 5* 31 7* 36 3   PLATELETS Thousands/uL 184 238 337   POTASSIUM mmol/L 3 7 4 2 4 7   CHLORIDE mmol/L 103 110* 104   CO2 mmol/L 27 16* 20*   BUN mg/dL 48* 58* 58*   CREATININE mg/dL 4 14* 4 88* 5 17*   CALCIUM mg/dL 7 4* 7 3* 9 2   MAGNESIUM mg/dL  --  1 6  --    PHOSPHORUS mg/dL  --  4 4*  --

## 2020-08-12 ENCOUNTER — APPOINTMENT (INPATIENT)
Dept: RADIOLOGY | Facility: HOSPITAL | Age: 75
DRG: 354 | End: 2020-08-12
Payer: COMMERCIAL

## 2020-08-12 LAB
ANION GAP SERPL CALCULATED.3IONS-SCNC: 9 MMOL/L (ref 4–13)
APTT PPP: 157 SECONDS (ref 23–37)
APTT PPP: 40 SECONDS (ref 23–37)
APTT PPP: 93 SECONDS (ref 23–37)
APTT PPP: >210 SECONDS (ref 23–37)
BASOPHILS # BLD AUTO: 0.01 THOUSANDS/ΜL (ref 0–0.1)
BASOPHILS NFR BLD AUTO: 0 % (ref 0–1)
BUN SERPL-MCNC: 45 MG/DL (ref 5–25)
CALCIUM SERPL-MCNC: 8 MG/DL (ref 8.3–10.1)
CHLORIDE SERPL-SCNC: 103 MMOL/L (ref 100–108)
CO2 SERPL-SCNC: 27 MMOL/L (ref 21–32)
CREAT SERPL-MCNC: 3.72 MG/DL (ref 0.6–1.3)
EOSINOPHIL # BLD AUTO: 0.02 THOUSAND/ΜL (ref 0–0.61)
EOSINOPHIL NFR BLD AUTO: 0 % (ref 0–6)
ERYTHROCYTE [DISTWIDTH] IN BLOOD BY AUTOMATED COUNT: 14.1 % (ref 11.6–15.1)
ERYTHROCYTE [DISTWIDTH] IN BLOOD BY AUTOMATED COUNT: 14.2 % (ref 11.6–15.1)
GFR SERPL CREATININE-BSD FRML MDRD: 11 ML/MIN/1.73SQ M
GLUCOSE SERPL-MCNC: 106 MG/DL (ref 65–140)
GLUCOSE SERPL-MCNC: 109 MG/DL (ref 65–140)
GLUCOSE SERPL-MCNC: 110 MG/DL (ref 65–140)
GLUCOSE SERPL-MCNC: 88 MG/DL (ref 65–140)
GLUCOSE SERPL-MCNC: 98 MG/DL (ref 65–140)
HCT VFR BLD AUTO: 29 % (ref 34.8–46.1)
HCT VFR BLD AUTO: 31.7 % (ref 34.8–46.1)
HGB BLD-MCNC: 10.2 G/DL (ref 11.5–15.4)
HGB BLD-MCNC: 9.4 G/DL (ref 11.5–15.4)
IMM GRANULOCYTES # BLD AUTO: 0.02 THOUSAND/UL (ref 0–0.2)
IMM GRANULOCYTES NFR BLD AUTO: 0 % (ref 0–2)
INR PPP: 1.24 (ref 0.84–1.19)
LYMPHOCYTES # BLD AUTO: 0.27 THOUSANDS/ΜL (ref 0.6–4.47)
LYMPHOCYTES NFR BLD AUTO: 5 % (ref 14–44)
MCH RBC QN AUTO: 30.1 PG (ref 26.8–34.3)
MCH RBC QN AUTO: 30.6 PG (ref 26.8–34.3)
MCHC RBC AUTO-ENTMCNC: 32.2 G/DL (ref 31.4–37.4)
MCHC RBC AUTO-ENTMCNC: 32.4 G/DL (ref 31.4–37.4)
MCV RBC AUTO: 93 FL (ref 82–98)
MCV RBC AUTO: 95 FL (ref 82–98)
MONOCYTES # BLD AUTO: 0.45 THOUSAND/ΜL (ref 0.17–1.22)
MONOCYTES NFR BLD AUTO: 9 % (ref 4–12)
NEUTROPHILS # BLD AUTO: 4.31 THOUSANDS/ΜL (ref 1.85–7.62)
NEUTS SEG NFR BLD AUTO: 86 % (ref 43–75)
NRBC BLD AUTO-RTO: 0 /100 WBCS
PLATELET # BLD AUTO: 179 THOUSANDS/UL (ref 149–390)
PLATELET # BLD AUTO: 198 THOUSANDS/UL (ref 149–390)
PMV BLD AUTO: 10.2 FL (ref 8.9–12.7)
PMV BLD AUTO: 10.9 FL (ref 8.9–12.7)
POTASSIUM SERPL-SCNC: 4.3 MMOL/L (ref 3.5–5.3)
PROTHROMBIN TIME: 15.6 SECONDS (ref 11.6–14.5)
RBC # BLD AUTO: 3.12 MILLION/UL (ref 3.81–5.12)
RBC # BLD AUTO: 3.33 MILLION/UL (ref 3.81–5.12)
SODIUM SERPL-SCNC: 139 MMOL/L (ref 136–145)
WBC # BLD AUTO: 5.08 THOUSAND/UL (ref 4.31–10.16)
WBC # BLD AUTO: 5.42 THOUSAND/UL (ref 4.31–10.16)

## 2020-08-12 PROCEDURE — 97163 PT EVAL HIGH COMPLEX 45 MIN: CPT

## 2020-08-12 PROCEDURE — 85730 THROMBOPLASTIN TIME PARTIAL: CPT | Performed by: SURGERY

## 2020-08-12 PROCEDURE — 99232 SBSQ HOSP IP/OBS MODERATE 35: CPT | Performed by: INTERNAL MEDICINE

## 2020-08-12 PROCEDURE — 82948 REAGENT STRIP/BLOOD GLUCOSE: CPT

## 2020-08-12 PROCEDURE — 85025 COMPLETE CBC W/AUTO DIFF WBC: CPT | Performed by: SURGERY

## 2020-08-12 PROCEDURE — 99024 POSTOP FOLLOW-UP VISIT: CPT | Performed by: SURGERY

## 2020-08-12 PROCEDURE — 74425 UROGRAPHY ANTEGRADE RS&I: CPT

## 2020-08-12 PROCEDURE — 97167 OT EVAL HIGH COMPLEX 60 MIN: CPT

## 2020-08-12 PROCEDURE — BT1G1ZZ FLUOROSCOPY OF ILEAL LOOP, URETERS AND KIDNEYS USING LOW OSMOLAR CONTRAST: ICD-10-PCS | Performed by: RADIOLOGY

## 2020-08-12 PROCEDURE — 74018 RADEX ABDOMEN 1 VIEW: CPT

## 2020-08-12 PROCEDURE — 80048 BASIC METABOLIC PNL TOTAL CA: CPT | Performed by: SURGERY

## 2020-08-12 PROCEDURE — 99232 SBSQ HOSP IP/OBS MODERATE 35: CPT | Performed by: PHYSICIAN ASSISTANT

## 2020-08-12 RX ORDER — LIDOCAINE HYDROCHLORIDE 20 MG/ML
JELLY TOPICAL ONCE
Status: COMPLETED | OUTPATIENT
Start: 2020-08-12 | End: 2020-08-12

## 2020-08-12 RX ORDER — METOPROLOL TARTRATE 5 MG/5ML
2.5 INJECTION INTRAVENOUS ONCE
Status: COMPLETED | OUTPATIENT
Start: 2020-08-12 | End: 2020-08-12

## 2020-08-12 RX ORDER — METOPROLOL TARTRATE 5 MG/5ML
5 INJECTION INTRAVENOUS EVERY 8 HOURS
Status: DISCONTINUED | OUTPATIENT
Start: 2020-08-13 | End: 2020-08-18

## 2020-08-12 RX ORDER — ONDANSETRON 2 MG/ML
4 INJECTION INTRAMUSCULAR; INTRAVENOUS ONCE
Status: COMPLETED | OUTPATIENT
Start: 2020-08-12 | End: 2020-08-12

## 2020-08-12 RX ADMIN — SODIUM CHLORIDE, SODIUM GLUCONATE, SODIUM ACETATE, POTASSIUM CHLORIDE AND MAGNESIUM CHLORIDE 75 ML/HR: 526; 502; 368; 37; 30 INJECTION, SOLUTION INTRAVENOUS at 16:53

## 2020-08-12 RX ADMIN — HYDROMORPHONE HYDROCHLORIDE 0.5 MG: 1 INJECTION, SOLUTION INTRAMUSCULAR; INTRAVENOUS; SUBCUTANEOUS at 07:08

## 2020-08-12 RX ADMIN — LIDOCAINE HYDROCHLORIDE 5 APPLICATION: 20 JELLY TOPICAL at 15:01

## 2020-08-12 RX ADMIN — HEPARIN SODIUM AND DEXTROSE 12 UNITS/KG/HR: 10000; 5 INJECTION INTRAVENOUS at 16:53

## 2020-08-12 RX ADMIN — METOROPROLOL TARTRATE 2.5 MG: 5 INJECTION, SOLUTION INTRAVENOUS at 03:30

## 2020-08-12 RX ADMIN — ONDANSETRON 4 MG: 2 INJECTION INTRAMUSCULAR; INTRAVENOUS at 19:59

## 2020-08-12 RX ADMIN — METOROPROLOL TARTRATE 5 MG: 5 INJECTION, SOLUTION INTRAVENOUS at 23:31

## 2020-08-12 RX ADMIN — METOROPROLOL TARTRATE 2.5 MG: 5 INJECTION, SOLUTION INTRAVENOUS at 17:18

## 2020-08-12 RX ADMIN — IOHEXOL 200 ML: 350 INJECTION, SOLUTION INTRAVENOUS at 13:42

## 2020-08-12 RX ADMIN — ONDANSETRON 4 MG: 2 INJECTION INTRAMUSCULAR; INTRAVENOUS at 10:46

## 2020-08-12 RX ADMIN — HYDROMORPHONE HYDROCHLORIDE 0.2 MG: 1 INJECTION, SOLUTION INTRAMUSCULAR; INTRAVENOUS; SUBCUTANEOUS at 03:30

## 2020-08-12 RX ADMIN — SODIUM CHLORIDE, SODIUM GLUCONATE, SODIUM ACETATE, POTASSIUM CHLORIDE AND MAGNESIUM CHLORIDE 75 ML/HR: 526; 502; 368; 37; 30 INJECTION, SOLUTION INTRAVENOUS at 03:30

## 2020-08-12 RX ADMIN — ONDANSETRON 4 MG: 2 INJECTION INTRAMUSCULAR; INTRAVENOUS at 12:03

## 2020-08-12 RX ADMIN — METOROPROLOL TARTRATE 2.5 MG: 5 INJECTION, SOLUTION INTRAVENOUS at 20:19

## 2020-08-12 RX ADMIN — HYDROMORPHONE HYDROCHLORIDE 0.5 MG: 1 INJECTION, SOLUTION INTRAMUSCULAR; INTRAVENOUS; SUBCUTANEOUS at 00:27

## 2020-08-12 RX ADMIN — METOROPROLOL TARTRATE 2.5 MG: 5 INJECTION, SOLUTION INTRAVENOUS at 12:03

## 2020-08-12 NOTE — PROGRESS NOTES
NEPHROLOGY PROGRESS NOTE   Sharon Granda 77 y o  female MRN: [id number]  Unit/Bed#: [facility] 507-01 Encounter: [id number]  Reason for Consult:  Acute kidney injury    Assessment/Plan:  1  Acute kidney injury, most likely secondary to hemodynamic fluctuations as well as obstructive uropathy given hydronephrosis  2  Chronic kidney disease previous baseline creatinine in the mid 2s to low 3's, follows with Dr Levy  3  Metabolic acidosis secondary to ongoing renal failure, overall has resolved  4  Bilateral hydronephrosis to the level of the ileal conduit, history cystectomy with ileal conduit, anticipated loopogram  5  Postoperative incarcerated ileal conduit parastomal hernia status post ex lap and parastomal hernia repair  6  Postoperative anemia, continue monitor clos     PLAN:  · Overall renal function has continued to improve  · Would continue with gentle fluids  · Anticipated loopogram, discussed with Urology    SUBJECTIVE:  Seen examined  Patient uncomfortable, complaining of mid abdominal discomfort  Worse after drinking fluids  No active chest pain or shortness of breath  Denies any lower extremity swelling  Review of Systems    OBJECTIVE:  Current Weight: Weight - Scale: 89 2 kg (196 lb 10 4 oz)  Vitals:    08/12/20 0312 08/12/20 0415 08/12/20 0626 08/12/20 1015   BP: 146/78  161/79 152/98   BP Location:       Pulse: 105  (!) 107 (!) 110   Resp: 18  18 18   Temp:   97 9 °F (36 6 °C) 99 1 °F (37 3 °C)   TempSrc:       SpO2: 98% 96% 98% 95%   Weight:       Height:           Intake/Output Summary (Last 24 hours) at 8/12/2020 1209  Last data filed at 8/12/2020 1137  Gross per 24 hour   Intake 1848 84 ml   Output 1795 ml   Net 53 84 ml       Physical Exam  Constitutional:       Appearance: She is ill-appearing  HENT:      Head: Normocephalic and atraumatic  Eyes:      General: No scleral icterus  Cardiovascular:      Rate and Rhythm: Normal rate     Pulmonary:      Effort: Pulmonary effort is normal       Breath sounds: Normal breath sounds  Abdominal:      General: There is distension  Tenderness: There is abdominal tenderness  Musculoskeletal:      Right lower leg: No edema  Left lower leg: No edema  Skin:     General: Skin is warm and dry  Neurological:      Mental Status: She is alert and oriented to person, place, and time           Medications:    Current Facility-Administered Medications:     heparin (porcine) 25,000 units in 250 mL infusion (premix), 3-30 Units/kg/hr (Order-Specific), Intravenous, Titrated, Maritza Barakat MD, Last Rate: 12 8 mL/hr at 08/12/20 1001, 15 Units/kg/hr at 08/12/20 1001    HYDROmorphone (DILAUDID) injection 0 2 mg, 0 2 mg, Intravenous, Q3H PRN, aVle Harvey MD, 0 2 mg at 08/12/20 0330    HYDROmorphone (DILAUDID) injection 0 5 mg, 0 5 mg, Intravenous, Q3H PRN, Vale Harvey MD, 0 5 mg at 08/12/20 0708    HYDROmorphone (DILAUDID) injection 0 5 mg, 0 5 mg, Intravenous, Q3H PRN, Vale Harvey MD, 0 5 mg at 08/11/20 8739    metoprolol (LOPRESSOR) injection 2 5 mg, 2 5 mg, Intravenous, Q8H, Vale Harvey MD, 2 5 mg at 08/12/20 1203    multi-electrolyte (PLASMALYTE-A/ISOLYTE-S PH 7 4) IV solution, 75 mL/hr, Intravenous, Continuous, Frank Swift DO, Last Rate: 75 mL/hr at 08/12/20 1001, 75 mL/hr at 08/12/20 1001    ondansetron (ZOFRAN) injection 4 mg, 4 mg, Intravenous, Q4H PRN, Vale Harvey MD, 4 mg at 08/12/20 1046    prothrombin complex conc human (KCENTRA) 3,000 Units, 35 Units/kg, Intravenous, Once, Vale Harvey MD    Laboratory Results:  Results from last 7 days   Lab Units 08/12/20  0504 08/11/20  2322 08/11/20  0436 08/10/20  0607 08/09/20  1716   WBC Thousand/uL 5 08 5 42 4 93 5 61 11 21*   HEMOGLOBIN g/dL 9 4* 10 2* 9 2* 10 4* 12 3   HEMATOCRIT % 29 0* 31 7* 28 5* 31 7* 36 3   PLATELETS Thousands/uL 179 198 184 238 337   POTASSIUM mmol/L 4 3  --  3 7 4 2 4 7   CHLORIDE mmol/L 103  --  103 110* 104   CO2 mmol/L 27 --  27 16* 20*   BUN mg/dL 45*  --  48* 58* 58*   CREATININE mg/dL 3 72*  --  4 14* 4 88* 5 17*   CALCIUM mg/dL 8 0*  --  7 4* 7 3* 9 2   MAGNESIUM mg/dL  --   --   --  1 6  --    PHOSPHORUS mg/dL  --   --   --  4 4*  --

## 2020-08-12 NOTE — PLAN OF CARE
Problem: PHYSICAL THERAPY ADULT  Goal: Performs mobility at highest level of function for planned discharge setting  See evaluation for individualized goals  Note: Prognosis: Good  Problem List: Decreased strength, Decreased range of motion, Decreased endurance, Impaired balance, Decreased mobility, Decreased safety awareness, Pain  Assessment: Pt seen by PT on 08/12/20 for high complexity PT evaluation due to a decline in functional mobility compared to baseline  Pt was admitted to [place] [address] on 8/9/20 for small bowel obstruction  Pt now s/p "LAPAROTOMY EXPLORATORY, PARASTOMAL HERNIA REPAIR WITH MESH LYSIS ADHESIONS"  Chart reviewed, PT orders active and activity orders indicate up with assistance  Pt  has a past medical history of Anxiety, Chronic kidney disease (CKD), stage IV (severe) ([facility] [address] ), Chronic thrombosis of subclavian vein (HCC), Compression fracture of cervical spine ([facility] [address] ), Hydronephrosis, Hypertension, Hypothyroid, Incontinence, Lung mass, Polycythemia vera ([facility] [address] ), Pulmonary embolism ([facility] [address] ) (2014), and Urinary tract infection  Pt reports no falls in the past 6 months  Pt resides in [address] home, with 7 [address]  Pt lives with son who is disabled and requires supervision  Prior to hospital admission pt functioned at a independent A level, intermittent use of RW used for mobility  Pt presents with decreased strength, endurance, and increased pain that contribute to limitations in bed mobility, functional mobility and functional transfers  During the session pt performed STS at a mod-A x2, and ambulated 6ft to chair at a min-A x2 w/ RW  Pt left sitting up in chair w/ chair alarm on and all needs in reach at end of therapy session  Pt would benefit from skilled PT in order to address impairments and functional limitations  PT to follow pt 3-5x /week, and would recommend rehab pending medical clearance    Barriers to Discharge: Inaccessible home environment, Decreased caregiver support     PT Discharge Recommendation: Post-Acute Rehabilitation Services     PT - OK to Discharge: Yes(pending medical clearance)    See flowsheet documentation for full assessment

## 2020-08-12 NOTE — QUICK NOTE
Nurse-Patient-Provider rounds were completed with the patient's nurse today, Marcellus Mcintosh  We discussed the plan is to   - KUB for vomiting s/p NGT removal; additional Zofran  - keep NPO  - Nephrology managing IVF    We reviewed all of the invasive devices/lines/telemetry orders   - None  DVT prophylaxis:  - heparin gtt    Diet:  - NPO    Pain Assessment / Plan:  - Continue current pain management regimen    Mobility Assessment / Plan:  - Activity as tolerated          Leonel Garnett PA-C

## 2020-08-12 NOTE — PROGRESS NOTES
Progress Note - Rogelio Anna 77 y o  female MRN: [id number]    Unit/Bed#: [facility] 507-01 Encounter: [id number]      Assessment:  77 F with incarcerated parastomal hernia s/p ex-lap, reduction and parastomal hernia repair with Phasix mesh, LUANN secondary to obstructive uropathy    Vss  Afebrile  abd is soft/ mild tender/ non distended  ELLI drain in place: 160 cc serosang  NGT: 450cc output  Cr: 4 1- 3 7  Wbc: 5-5    Plan:  Continue heparin gtt  Remove NGT  Start clear liquid diet  Continue elli drain  Continue ivf per nephro, appreciate recs  ambulate    Subjective:   Feeling a little better overall  Had a bowel mvt this am   Denied fever, chills, chest pain, shortness of breath, nausea, vomiting, or worsening abdominal pain this morning  Objective:     Vitals: Blood pressure 152/81, pulse (!) 106, temperature 98 °F (36 7 °C), temperature source Oral, resp  rate 18, height 5' (1 524 m), weight 89 2 kg (196 lb 10 4 oz), SpO2 98 %, not currently breastfeeding  ,Body mass index is 38 41 kg/m²  Intake/Output Summary (Last 24 hours) at 8/12/2020 0202  Last data filed at 8/12/2020 0034  Gross per 24 hour   Intake 2091 18 ml   Output 2200 ml   Net -108 82 ml       Physical Exam  General: NAD  HEENT: NC/AT  MMM  Cv: RRR  Lungs: normal effort  Ab: Soft, NT/ND  Incision c/d/i  ELLI in place     Ex: no CCE  Neuro: AAOx3    Scheduled Meds:  Current Facility-Administered Medications   Medication Dose Route Frequency Provider Last Rate    heparin (porcine)  3-30 Units/kg/hr (Order-Specific) Intravenous Titrated Gosia Burris MD 18 Units/kg/hr (08/11/20 5962)    HYDROmorphone  0 2 mg Intravenous Q3H PRN Yuliana Clemens MD      HYDROmorphone  0 5 mg Intravenous Q3H PRN Yuliana Clemens MD      HYDROmorphone  0 5 mg Intravenous Q3H PRN Yuliana Clemens MD      metoprolol  2 5 mg Intravenous Asha Valencia MD      multi-electrolyte  75 mL/hr Intravenous Continuous Genell Raffy Swift DO 75 mL/hr (08/11/20 1407)    ondansetron  4 mg Intravenous Q4H PRN Efrain Fu MD      Kcentra (PROTHROMBIN)  35 Units/kg Intravenous Once Efrain Fu MD       Continuous Infusions:heparin (porcine), 3-30 Units/kg/hr (Order-Specific), Last Rate: 18 Units/kg/hr (08/11/20 2342)  multi-electrolyte, 75 mL/hr, Last Rate: 75 mL/hr (08/11/20 1407)      PRN Meds:  HYDROmorphone    HYDROmorphone    HYDROmorphone    ondansetron      Invasive Devices     Peripheral Intravenous Line            Peripheral IV 08/09/20 Right Antecubital 2 days    Peripheral IV 08/11/20 Right Hand less than 1 day          Drain            Urostomy Ileal conduit RUQ 1407 days    NG/OG/Enteral Tube Nasogastric Right nares 2 days    Closed/Suction Drain Left LLQ Bulb 19 Fr  1 day                Lab, Imaging and other studies: I have personally reviewed pertinent reports      VTE Pharmacologic Prophylaxis: Heparin  VTE Mechanical Prophylaxis: sequential compression device

## 2020-08-12 NOTE — OCCUPATIONAL THERAPY NOTE
Occupational Therapy Evaluation      [facility]    8/12/2020    Principal Problem:    Small bowel obstruction ([facility] )      Past Medical History:   Diagnosis Date    Anxiety     Chronic kidney disease (CKD), stage IV (severe) (HCC)     stage IV    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    Compression fracture of cervical spine (HCC)     Hydronephrosis     Hypertension     Hypothyroid     Incontinence     Lung mass     Improving on PET/CT 1/2016    Polycythemia vera ([facility] )     Pulmonary embolism ([facility] ) 2014    Urinary tract infection        Past Surgical History:   Procedure Laterality Date    ABDOMINAL ADHESION SURGERY N/A 8/9/2020    Procedure: LYSIS ADHESIONS;  Surgeon: Edenilson Gamez DO;  Location: [facility] MAIN OR;  Service: General    BLADDER SUSPENSION      BOTOX INJECTION N/A 7/27/2016    Procedure: BOTOX INJECTION ;  Surgeon: Antonio Cantor MD;  Location: [facility] Main OR;  Service:     CHOLECYSTECTOMY N/A     COLONOSCOPY      CYSTECTOMY, RADICAL WITH ILEOCONDUIT N/A 10/4/2016    Procedure: SUPRATRIGONAL CYSTECTOMY WITH ILEAL CONDUIT ;  Surgeon: Antonio Cantor MD;  Location: [facility] MAIN OR;  Service:    Miranda Hawk W/ RETROGRADES Bilateral 7/27/2016    Procedure: Kenny Solitario; RETROGRADE PYELOGRAM ;  Surgeon: Antonio Cantor MD;  Location: [facility] Main OR;  Service:     IR CENTRAL LINE PLACEMENT  7/17/2020    LAPAROTOMY N/A 8/9/2020    Procedure: LAPAROTOMY EXPLORATORY, PARASTOMAL HERNIA REPAIR WITH MESH;  Surgeon: Edenilson Gamez DO;  Location: [facility] MAIN OR;  Service: General    AZ COLONOSCOPY FLX DX W/COLLJ SPEC WHEN PFRMD N/A 8/31/2016    Procedure: COLONOSCOPY;  Surgeon: Megan Varghese MD;  Location: [facility] GI LAB;   Service: Gastroenterology    AZ CYSTOSCOPY,INSERT URETERAL STENT Bilateral 7/27/2016    Procedure: STENT INSERTION; EXCISION OF MESH ;  Surgeon: Antonio Cantor MD;  Location: [facility] Main OR;  Service: Urology    TONSILLECTOMY      TUBAL LIGATION      WISDOM TOOTH EXTRACTION 08/12/20 0831   Note Type   Note type Eval/Treat   Restrictions/Precautions   Weight Bearing Precautions Per Order No   Other Precautions Contact/isolation; Chair Alarm; Bed Alarm;Multiple lines;Telemetry;O2;Fall Risk;Pain;Hard of hearing   Pain Assessment   Pain Assessment Tool 0-10   Pain Score 8   Pain Location/Orientation Location: Generalized   Pain Onset/Description Onset: Ongoing   Patient's Stated Pain Goal No pain   Hospital Pain Intervention(s) Repositioned; Ambulation/increased activity; Emotional support   Home Living   Type of [address] [place] [address] Two level;Stairs to enter with rails  ([address]; 6-7 MARLON w/ R HR; no bath on 1st flr)   Bathroom Shower/Tub Walk-in shower   Bathroom Toilet Standard   Bathroom Equipment Grab bars in shower;Built-in shower seat   Bathroom Accessibility Accessible   Home Equipment Walker;Cane   Additional Comments Pt reports use of RW for mobility PRN when she's feeling "weak" and "tired" PTA   Prior Function   Level of [place] Independent with ADLs and functional mobility   Lives With Son   Receives Help From Family   ADL Assistance Independent   IADLs Independent   Falls in the last 6 months 0   Vocational Retired   Lifestyle   Autonomy Pt reports being IND w/ all ADLS and IADLS (son occasionally A w/ chores); (+) drives PTA    Reciprocal Relationships Pt live w/ her son that has Autism and is mostly self-efficiant  Pt reports her other son is currently staying with her son to provide A as needed  Pt reports her son that has Autism is able to provide only limited asisst upon D/C      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   Intrinsic Gratification Pt reports enjoying being IND   Psychosocial   Psychosocial (WDL) X   Patient Behaviors/Mood Flat affect   Subjective   Subjective "I really don't feel like getting up right now "   ADL   Where Assessed Chair   Eating Assistance 5  Supervision/Setup   Grooming Assistance 5  Supervision/Setup   [address] 4  Minimal Assistance   LB Bathing Assistance 2  Maximal Assistance   UB Dressing Assistance 4  Minimal Assistance   LB Dressing Assistance 2  Maximal Assistance   Toileting Assistance  2  Maximal Assistance   Bed Mobility   Supine to Sit Unable to assess   Sit to Supine Unable to assess   Additional Comments Pt laying supine in bed upon OT arrival  Pt seated OOB in chair w/ chair alarm activated and all needs in reach s/p OT session  Transfers   Sit to Stand 3  Moderate assistance   Additional items Assist x 2; Increased time required;Verbal cues;Armrests   Stand to Sit 3  Moderate assistance   Additional items Assist x 2; Increased time required;Verbal cues;Armrests   Additional Comments Transfers w/ RW  VC and TC required for safety and hand placement  Functional Mobility   Functional Mobility 4  Minimal assistance  (x2)   Additional Comments Pt demonstrated short distance household mobility in room w/ RW at [place] [address] level  Pt demonstrated ability to take ~5 steps forward and required immediate seated rest break 2' fatigue w/ close chair follow      Additional items Rolling walker   Balance   Static Sitting Fair +   Dynamic Sitting Fair   Static Standing Fair -   Dynamic Standing Poor +   Ambulatory Poor   Activity Tolerance   Activity Tolerance Patient limited by fatigue   Medical Staff Made Aware PT [facility]; [facility]   Nurse Made Aware RN cleared Pt for OT session   RUE Assessment   RUE Assessment WFL  (decreased gross strength)   LUE Assessment   LUE Assessment WFL  (decreased gross strength)   Hand Function   Gross Motor Coordination Functional   Fine Motor Coordination Functional   Sensation   Light Touch No apparent deficits   Cognition   Overall Cognitive Status WFL   Arousal/Participation Alert   Attention Attends with cues to redirect   Orientation Level Oriented X4   Memory Decreased recall of precautions;Decreased recall of recent events   Following Commands Follows one step commands with increased time or repetition   Comments Pt required increased encouragement to participate in therapy this day  Pt required VC and TC for all safety and sequencing  Assessment   Limitation Decreased ADL status; Decreased UE strength;Decreased Safe judgement during ADL;Decreased cognition;Decreased endurance;Decreased high-level ADLs   Prognosis Fair   Assessment Pt is a 78 yo female seen for OT eval s/p adm to [facility] on 8/9/2020 w/ abdominal pain dx'd w/ SBO  Pt  has a past medical history of Anxiety, Chronic kidney disease (CKD), stage IV (severe) ([address] ), Chronic thrombosis of subclavian vein (HCC), Compression fracture of cervical spine ([address] ), Hydronephrosis, Hypertension, Hypothyroid, Incontinence, Lung mass, Polycythemia vera ([facility] ), Pulmonary embolism ([facility] ) (2014), and Urinary tract infection  Pt with active OT orders and up with assistance  orders  Pt is retired and resides w/ son in [facility] w/ 6-7STE w/ FF to [facility] where bathroom is located  Pt reports her son has autism and is able to provide limited support  Pt was I w/ ADLS and IADLS, drove, & required use of DME PTA, occasional use of RW  Pt is currently demonstrating the following occupational deficits: Min A UB bathing/dressing, Max A LB bathing/dressing and toileting, Mod Ax2 STS and Min A x2 short distance mobility w/ RW  These deficits that are impacting pt's baseline areas of occupation are a result of the following impairments: pain, endurance, activity tolerance, functional mobility, forward functional reach, balance, functional standing tolerance, unsupportive home environment, decreased I w/ ADLS/IADLS, strength, decreased safety awareness and decreased insight into deficits  The following Occupational Performance Areas to address include: grooming, bathing/shower, toilet hygiene, dressing, health maintenance, functional mobility and clothing management    Based on the aforementioned OT evaluation, functional performance deficits, and assessments, pt has been identified as a high complexity evaluation  Recommend STR upon D/C  Pt to continue to benefit from acute immediate OT services to address the following goals 3-5x/week to  w/in 7-10 days:    Goals   Patient Goals To sit in her chair   LTG Time Frame 7-10   Long Term Goal #1 Refer to goals below   Plan   Treatment Interventions ADL retraining;Functional transfer training;UE strengthening/ROM; Endurance training;Cognitive reorientation;Patient/family training;Equipment evaluation/education; Compensatory technique education; Activityengagement; Energy conservation   Goal Expiration Date 20   OT Frequency 3-5x/wk   Recommendation   OT Discharge Recommendation Post-Acute Rehabilitation Services   OT - OK to Discharge Yes  (when medically cleared)   Modified Thien Scale   Modified Thien Scale 4       GOALS    1) Pt will improve activity tolerance to G for min 30 min txment sessions for increase engagement in functional tasks    2) Pt will complete UB/LB dressing/self care w/ mod I using adaptive device and DME as needed    3) Pt will complete bathing w/ Mod I w/ use of AE and DME as needed    4) Pt will complete toileting w/ mod I w/ G hygiene/thoroughness using DME as needed    5) Pt will improve functional transfers to Mod I on/off all surfaces using DME as needed w/ G balance/safety     6) Pt will improve functional mobility during ADL/IADL/leisure tasks to Mod I using DME as needed w/ G balance/safety     7) Pt will participate in simulated IADL management task to increase independence to Mod I w/ G safety and endurance    8) Pt will be attentive 100% of the time during ongoing cognitive assessment w/ G participation to assist w/ safe d/c planning/recommendations    9) Pt will demonstrate G carryover of pt/caregiver education and training as appropriate w/o cues w/ good tolerance to increase safety during functional tasks    10) Pt will demonstrate 100% carryover of energy conservation techniques t/o functional I/ADL/leisure tasks w/o cues s/p skilled education to increase endurance during functional tasks           Fredy Parra, MS, OTR/L

## 2020-08-12 NOTE — PHYSICAL THERAPY NOTE
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]  Acute kidney injury superimposed on chronic kidney disease ([facility] ) [N17 9, N18 9]  Nausea and vomiting, intractability of vomiting not specified, unspecified vomiting type [R11 2]    Problem List  Patient Active Problem List   Diagnosis    Chronic saddle pulmonary embolism ([facility] )    Stage 4 chronic kidney disease ([facility] )    Bladder mass    Small bowel obstruction ([facility] )    Obstructive uropathy    Secondary hyperparathyroidism of renal origin ([facility] )    Hypothyroidism    Hypertension    Polycythemia vera ([facility] )    Fall    Subdural hematoma, acute (HCC)    Intraventricular hemorrhage (HCC)    Diarrhea    Recurrent Clostridium difficile diarrhea    Anemia in CKD (chronic kidney disease)    Mass of urethra    Stage 5 chronic kidney disease not on chronic dialysis ([facility] )    Thoracic compression fracture (HCC)    Hematuria    Abnormal finding on MRI of brain    Benign neoplasm of supratentorial region of brain ([facility] )    Meningioma ([facility] )    UTI (urinary tract infection)    Herpes zoster    Inverted T wave    Polycythemia       Past Medical History  Past Medical History:   Diagnosis Date    Anxiety     Chronic kidney disease (CKD), stage IV (severe) (HCC)     stage IV    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    Compression fracture of cervical spine (HCC)     Hydronephrosis     Hypertension     Hypothyroid     Incontinence     Lung mass     Improving on PET/CT 1/2016    Polycythemia vera ([facility] )     Pulmonary embolism ([facility] ) 2014    Urinary tract infection        Past Surgical History  Past Surgical History:   Procedure Laterality Date    ABDOMINAL ADHESION SURGERY N/A 8/9/2020    Procedure: LYSIS ADHESIONS;  Surgeon: Ina Whyte DO;  Location: [facility]; Service: General    BLADDER SUSPENSION      BOTOX INJECTION N/A 7/27/2016    Procedure: BOTOX INJECTION ;  Surgeon: Alex Greene MD;  Location: [facility];  Service:    [address], RADICAL WITH ILEOCONDUIT N/A 10/4/2016    Procedure: Precious Brewer WITH ILEAL CONDUIT ;  Surgeon: Alex Greene MD;  Location: [facility] MA[facility] OR;  Service:    Kiara Belch CYSTOSCOPY W/ RETROGRADES Bilateral 7/27/2016    Procedure: Malaika Martinez; RETROGRADE PYELOGRAM ;  Surgeon: Alex Greene MD;  Location: [facility];  Service:     IR CENTRAL LINE PLACEMENT  7/17/2020    LAPAROTOMY N/A 8/9/2020    Procedure: LAPAROTOMY EXPLORATORY, PARASTOMAL HERNIA REPAIR WITH MESH;  Surgeon: Shelly Levy DO;  Location: [facility] OR;  Service: General    CT COLONOSCOPY FLX DX W/COLLJ SPEC WHEN PFRMD N/A 8/31/2016    Procedure: COLONOSCOPY;  Surgeon: Dio Latham MD;  Location: [facility] GI LAB;   Service: Gastroenterology    CT CYSTOSCOPY,INSERT URETERAL STENT Bilateral 7/27/2016    Procedure: STENT INSERTION; EXCISION OF MESH ;  Surgeon: Alex Greene MD;  Location: [facility];  Service: Urology    TONSILLECTOMY      TUBAL LIGATION      WISDOM TOOTH EXTRACTION            08/12/20 0830   Note Type   Note type Eval only   Pain Assessment   Pain Assessment Tool 0-10   Pain Score 8   Pain Location/Orientation Location: Generalized   Home Living   Type of [address] [place] [address] Two level;Stairs to enter with rails;Bed/bath upstairs  (7STE)   Bathroom Shower/Tub Walk-in shower   Bathroom Toilet Standard   Bathroom Equipment Built-in shower seat;Grab bars in shower   [address] Walker;Cane   Additional Comments Pt reports intermittent use of RW   Prior Function   Level of [place] Independent with ADLs and functional mobility   Lives With Son   Receives Help From Family   ADL Assistance Independent   IADLs Independent   Falls in the last 6 months 0   Vocational Retired   Restrictions/Precautions   [facility] Bearing Precautions Per Order No   Other Precautions Contact/isolation; Chair Alarm; Bed Alarm;Telemetry;Multiple lines; Fall Risk;Pain;O2;Hard of hearing  (2LO2NC)   General   Family/Caregiver Present No   Cognition   Overall Cognitive Status WFL   Arousal/Participation Alert   Attention Attends with cues to redirect   Orientation Level Oriented X4   Memory Decreased recall of precautions;Decreased short term memory;Decreased recall of recent events   Following Commands Follows one step commands with increased time or repetition   Comments Pt very tired but agreeable to therapy session  Pt required prolonged time to respond to therapist's instructions/ inquaries   RLE Assessment   RLE Assessment   (functionally 3+/5)   LLE Assessment   LLE Assessment   (functionally 3+/5)   Bed Mobility   Additional Comments Pt found sitting up in chair upon initial PT arrival  Pt returned to chair w/ chair alarm on and all needs in reach s/p PT session  Transfers   Sit to Stand 3  Moderate assistance   Additional items Assist x 2;HOB elevated; Bedrails; Increased time required;Verbal cues   Stand to Sit 3  Moderate assistance   Additional items Assist x 2; Increased time required;Verbal cues;Armrests   Additional Comments Pt required VC and TC for hand placement and therapy  Pt used RW for all transfers  Ambulation/Elevation   Gait pattern Excessively slow; Short stride; Foward flexed; Inconsistent wisam;Decreased foot clearance; Improper Weight shift   Gait Assistance 4  Minimal assist  (additional assist for chair follow)   Additional items Assist x 2;Verbal cues; Tactile cues   Assistive Device Rolling walker   Distance 6ft   Balance   Static Sitting Fair +   Dynamic Sitting Fair   Static Standing Fair -   Dynamic Standing Poor +   Ambulatory Poor   Endurance Deficit   Endurance Deficit Yes   Endurance Deficit Description weakness, fatigue   Activity Tolerance   Activity Tolerance Patient limited by fatigue;Patient limited by pain   Medical Staff Made Aware PT Misha Lloyd RN   Nurse Made Aware RN cleared pt for PT session   Assessment   Prognosis Good   Problem List Decreased strength;Decreased range of motion;Decreased endurance; Impaired balance;Decreased mobility; Decreased safety awareness;Pain   Assessment Pt seen by PT on 08/12/20 for high complexity PT evaluation due to a decline in functional mobility compared to baseline  Pt was admitted to [place] [address] on 8/9/20 for small bowel obstruction  Pt now s/p "LAPAROTOMY EXPLORATORY, PARASTOMAL HERNIA REPAIR WITH MESH LYSIS ADHESIONS"  Chart reviewed, PT orders active and activity orders indicate up with assistance  Pt  has a past medical history of Anxiety, Chronic kidney disease (CKD), stage IV (severe) ([facility] ), Chronic thrombosis of subclavian vein (HCC), Compression fracture of cervical spine ([facility] ), Hydronephrosis, Hypertension, Hypothyroid, Incontinence, Lung mass, Polycythemia vera ([facility] ), Pulmonary embolism ([facility] ) (2014), and Urinary tract infection  Pt reports no falls in the past 6 months  Pt resides in [address] [place] home, with 7 MARLON  Pt lives with son who is disabled and requires supervision  Prior to hospital admission pt functioned at a independent A level, intermittent use of RW used for mobility  Pt presents with decreased strength, endurance, and increased pain that contribute to limitations in bed mobility, functional mobility and functional transfers  During the session pt performed STS at a mod-A x2, and ambulated 6ft to chair at a min-A x2 w/ RW  Pt left sitting up in chair w/ chair alarm on and all needs in reach at end of therapy session  Pt would benefit from skilled PT in order to address impairments and functional limitations  PT to follow pt 3-5x /week, and would recommend rehab pending medical clearance     Barriers to Discharge Inaccessible home environment;Decreased caregiver support   Goals   Patient Goals To get better and return home  [facility] Expiration Date 08/26/20   Short Term Goal #1 1  Pt will be able to complete all aspects of bed mobility at a independent A level in order to decrease burden on caregivers  2  Pt will be able to complete functional transfers at a Mod-I A level in order to increase independence  3  Pt will be able to ambulate 20+ ft at Mod-I A level with LRAD in order to promote safe transition back into the home environment  4  Pt will be able to negotiate a full flight of steps at a Mod-I A level with/without railing in order to promote safe transition back into the home environment  5  Pt will demonstrate an increase in b/l LE strength by one grade as indicated on MMT scale in order to promote functional independence  6  Pt will demonstrate improved balance by increasing at least one grade on the graded balance scale in order to reduce falls risk  Plan   Treatment/Interventions OT; Spoke to nursing; Functional transfer training;LE strengthening/ROM; Elevations; Therapeutic exercise; Endurance training;Patient/family training;Equipment eval/education; Bed mobility;Gait training   PT Frequency Other (Comment)  (3-5x /week)   Recommendation   PT Discharge Recommendation Post-Acute Rehabilitation Services   Equipment Recommended Walker   PT - OK to Discharge Yes  (pending medical clearance)   Modified [place] Scale   Modified [place] Scale 4         Aleksandra Blue, TANIYA

## 2020-08-12 NOTE — PLAN OF CARE
Problem: OCCUPATIONAL THERAPY ADULT  Goal: Performs self-care activities at highest level of function for planned discharge setting  See evaluation for individualized goals  Description: Treatment Interventions: ADL retraining, Functional transfer training, UE strengthening/ROM, Endurance training, Cognitive reorientation, Patient/family training, Equipment evaluation/education, Compensatory technique education, Activityengagement, Energy conservation          See flowsheet documentation for full assessment, interventions and recommendations  Note: Limitation: Decreased ADL status, Decreased UE strength, Decreased Safe judgement during ADL, Decreased cognition, Decreased endurance, Decreased high-level ADLs  Prognosis: Fair  Assessment: Pt is a 78 yo female seen for OT eval s/p adm to B on 8/9/2020 w/ abdominal pain dx'd w/ SBO  Pt  has a past medical history of Anxiety, Chronic kidney disease (CKD), stage IV (severe) ([address] ), Chronic thrombosis of subclavian vein (HCC), Compression fracture of cervical spine ([address] ), Hydronephrosis, Hypertension, Hypothyroid, Incontinence, Lung mass, Polycythemia vera ([address] ), Pulmonary embolism ([facility] ) (2014), and Urinary tract infection  Pt with active OT orders and up with assistance  orders  Pt is retired and resides w/ son in [facility] w/ 6-7STE w/ FF to 2nd flr where bathroom is located  Pt reports her son has autism and is able to provide limited support  Pt was I w/ ADLS and IADLS, drove, & required use of DME PTA, occasional use of RW  Pt is currently demonstrating the following occupational deficits: Min A UB bathing/dressing, Max A LB bathing/dressing and toileting, Mod Ax2 STS and Min A x2 short distance mobility w/ RW   These deficits that are impacting pt's baseline areas of occupation are a result of the following impairments: pain, endurance, activity tolerance, functional mobility, forward functional reach, balance, functional standing tolerance, unsupportive home environment, decreased I w/ ADLS/IADLS, strength, decreased safety awareness and decreased insight into deficits  The following Occupational Performance Areas to address include: grooming, bathing/shower, toilet hygiene, dressing, health maintenance, functional mobility and clothing management  Based on the aforementioned OT evaluation, functional performance deficits, and assessments, pt has been identified as a high complexity evaluation  Recommend STR upon D/C   Pt to continue to benefit from acute immediate OT services to address the following goals 3-5x/week to  w/in 7-10 days:      OT Discharge Recommendation: [address]  OT - OK to Discharge: Yes(when medically cleared)

## 2020-08-12 NOTE — RESPIRATORY THERAPY NOTE
RT Protocol Note  Domingo Guthrie 77 y o  female MRN: [id number]  Unit/Bed#: [facility] 507-01 Encounter: [id number]    Assessment    Principal Problem:    Small bowel obstruction ([facility] )      Home Pulmonary Medications:         Past Medical History:   Diagnosis Date    Anxiety     Chronic kidney disease (CKD), stage IV (severe) (HCC)     stage IV    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    Compression fracture of cervical spine (HCC)     Hydronephrosis     Hypertension     Hypothyroid     Incontinence     Lung mass     Improving on PET/CT 1/2016    Polycythemia vera ([facility] [address] )     Pulmonary embolism ([facility] ) 2014    Urinary tract infection      Social History     Socioeconomic History    Marital status:       Spouse name: None    Number of children: None    Years of education: None    Highest education level: None   Occupational History    None   Social Needs    Financial resource strain: None    Food insecurity     Worry: None     Inability: None    Transportation needs     Medical: None     Non-medical: None   Tobacco Use    Smoking status: Never Smoker    Smokeless tobacco: Never Used    Tobacco comment: n/a   Substance and Sexual Activity    Alcohol use: Not Currently     Frequency: Never     Binge frequency: Never     Comment: n/s    Drug use: Not Currently     Comment: n/a    Sexual activity: Not Currently     Partners: Male   Lifestyle    Physical activity     Days per week: None     Minutes per session: None    Stress: None   Relationships    Social connections     Talks on phone: None     Gets together: None     Attends Denominational service: None     Active member of club or organization: None     Attends meetings of clubs or organizations: None     Relationship status: None    Intimate partner violence     Fear of current or ex partner: None     Emotionally abused: None     Physically abused: None     Forced sexual activity: None   Other Topics Concern    None   Social History Narrative    None       Subjective         Objective    Physical Exam:   Assessment Type: (P) Assess only  Bilateral Breath Sounds: (P) Clear  Cough: (P) None    Vitals:  Blood pressure 152/81, pulse (!) (P) 106, temperature 98 3 °F (36 8 °C), temperature source Oral, resp  rate 18, height 5' (1 524 m), weight 89 2 kg (196 lb 10 4 oz), SpO2 (P) 99 %, not currently breastfeeding  Imaging and other studies: I have personally reviewed pertinent reports  Plan       Airway Clearance Plan: (P) Discontinue Protocol     Resp Comments: (P) Pt  evaluated for respiratory protocol  BS=clear and well aerated  Pt  in no distress on 2LPM nasal cannula  SpO2=99%  Pt  c/o coughing and inability to clear secretions  this feeling may be due to her recent intubation because both her recent CXR and BS are clear  there is no indication at this time for any airway clearance therapy  Protocol D/C

## 2020-08-12 NOTE — PROGRESS NOTES
UROLOGY PROGRESS NOTE   Patient Identifiers: Jose Mary (MRN [id number])  Date of Service: 8/12/2020    Subjective:    Awake and alert  Sitting out of bed in a chair  The NG tube has been removed  She has made a small bowel movement  Creatinine 3 72  WBC 5 08  She remains afebrile      Patient has  Still feels weak from surgery      Objective:     VITALS:    Vitals:    08/12/20 0626   BP: 161/79   Pulse: (!) 107   Resp: 18   Temp: 97 9 °F (36 6 °C)   SpO2: 98%           LABS:  Lab Results   Component Value Date    HGB 9 4 (L) 08/12/2020    HCT 29 0 (L) 08/12/2020    WBC 5 08 08/12/2020     08/12/2020   ]    Lab Results   Component Value Date     12/17/2015    K 4 3 08/12/2020     08/12/2020    CO2 27 08/12/2020    BUN 45 (H) 08/12/2020    CREATININE 3 72 (H) 08/12/2020    CALCIUM 8 0 (L) 08/12/2020    GLUCOSE 138 10/04/2016   ]        INPATIENT MEDS:    Current Facility-Administered Medications:     heparin (porcine) 25,000 units in 250 mL infusion (premix), 3-30 Units/kg/hr (Order-Specific), Intravenous, Titrated, Rosamaria Still MD, Stopped at 08/12/20 3821    HYDROmorphone (DILAUDID) injection 0 2 mg, 0 2 mg, Intravenous, Q3H PRN, Whitfield Duane, MD, 0 2 mg at 08/12/20 0330    HYDROmorphone (DILAUDID) injection 0 5 mg, 0 5 mg, Intravenous, Q3H PRN, Whitfield Duane, MD, 0 5 mg at 08/12/20 0708    HYDROmorphone (DILAUDID) injection 0 5 mg, 0 5 mg, Intravenous, Q3H PRN, Whitfield Duane, MD, 0 5 mg at 08/11/20 4640    metoprolol (LOPRESSOR) injection 2 5 mg, 2 5 mg, Intravenous, Q8H, Whitfield Duane, MD, 2 5 mg at 08/12/20 0330    multi-electrolyte (PLASMALYTE-A/ISOLYTE-S PH 7 4) IV solution, 75 mL/hr, Intravenous, Continuous, Cleveland Swift DO, Last Rate: 75 mL/hr at 08/12/20 0330, 75 mL/hr at 08/12/20 0330    ondansetron (ZOFRAN) injection 4 mg, 4 mg, Intravenous, Q4H PRN, Whitfield Duane, MD    prothrombin complex conc human (KCENTRA) 3,000 Units, 35 Units/kg, Intravenous, Once, Cecy Rodrigez MD      Physical Exam:   /79   Pulse (!) 107   Temp 97 9 °F (36 6 °C)   Resp 18   Ht 5' (1 524 m)   Wt 89 2 kg (196 lb 10 4 oz)   SpO2 98%   BMI 38 41 kg/m²   GEN: no acute distress    RESP: breathing comfortably with no accessory muscle use    ABD: soft, non-tender, non-distended stoma pink and viable  INCISION: clean, dry, intact   EXT: no significant peripheral edema     ILEAL CONDUIT: in place draining clear yellow urine  no clots and       RADIOLOGY:   None     Assessment:   1  Small-bowel obstruction  2  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

## 2020-08-12 NOTE — PLAN OF CARE
Problem: Potential for Falls  Goal: Patient will remain free of falls  Description: INTERVENTIONS:  - Assess patient frequently for physical needs  -  Identify cognitive and physical deficits and behaviors that affect risk of falls    -  [place] fall precautions as indicated by assessment   - Educate patient/family on patient safety including physical limitations  - Instruct patient to call for assistance with activity based on assessment  - Modify environment to reduce risk of injury  - Consider OT/PT consult to assist with strengthening/mobility  8/12/2020 0005 by Roselia Jeffries RN  Outcome: Progressing  8/12/2020 0004 by Roselia Jeffries RN  Outcome: Progressing     Problem: Prexisting or High Potential for Compromised Skin Integrity  Goal: Skin integrity is maintained or improved  Description: INTERVENTIONS:  - Identify patients at risk for skin breakdown  - Assess and monitor skin integrity  - Assess and monitor nutrition and hydration status  - Monitor labs   - Assess for incontinence   - Turn and reposition patient  - Assist with mobility/ambulation  - Relieve pressure over bony prominences  - Avoid friction and shearing  - Provide appropriate hygiene as needed including keeping skin clean and dry  - Evaluate need for skin moisturizer/barrier cream  - Collaborate with interdisciplinary team   - Patient/family teaching  - Consider wound care consult   8/12/2020 0005 by Roselia Jeffries RN  Outcome: Progressing  8/12/2020 0004 by Roselia Jeffries RN  Outcome: Progressing     Problem: CARDIOVASCULAR - ADULT  Goal: Maintains optimal cardiac output and hemodynamic stability  Description: INTERVENTIONS:  - Monitor I/O, vital signs and rhythm  - Monitor for S/S and trends of decreased cardiac output  - Administer and titrate ordered vasoactive medications to optimize hemodynamic stability  - Assess quality of pulses, skin color and temperature  - Assess for signs of decreased coronary artery perfusion  - Instruct patient to report change in severity of symptoms  Outcome: Progressing  Goal: Absence of cardiac dysrhythmias or at baseline rhythm  Description: INTERVENTIONS:  - Continuous cardiac monitoring, vital signs, obtain 12 lead EKG if ordered  - Administer antiarrhythmic and heart rate control medications as ordered  - Monitor electrolytes and administer replacement therapy as ordered  Outcome: Progressing     Problem: RESPIRATORY - ADULT  Goal: Achieves optimal ventilation and oxygenation  Description: INTERVENTIONS:  - Assess for changes in respiratory status  - Assess for changes in mentation and behavior  - Position to facilitate oxygenation and minimize respiratory effort  - Oxygen administered by appropriate delivery if ordered  - Initiate smoking cessation education as indicated  - Encourage broncho-pulmonary hygiene including cough, deep breathe, Incentive Spirometry  - Assess the need for suctioning and aspirate as needed  - Assess and instruct to report SOB or any respiratory difficulty  - Respiratory Therapy support as indicated  Outcome: Progressing     Problem: GASTROINTESTINAL - ADULT  Goal: Minimal or absence of nausea and/or vomiting  Description: INTERVENTIONS:  - Administer IV fluids if ordered to ensure adequate hydration  - Maintain NPO status until nausea and vomiting are resolved  - Nasogastric tube if ordered  - Administer ordered antiemetic medications as needed  - Provide nonpharmacologic comfort measures as appropriate  - Advance diet as tolerated, if ordered  - Consider nutrition services referral to assist patient with adequate nutrition and appropriate food choices  Outcome: Progressing  Goal: Maintains or returns to baseline bowel function  Description: INTERVENTIONS:  - Assess bowel function  - Encourage oral fluids to ensure adequate hydration  - Administer IV fluids if ordered to ensure adequate hydration  - Administer ordered medications as needed  - Encourage mobilization and activity  - Consider nutritional services referral to assist patient with adequate nutrition and appropriate food choices  Outcome: Progressing  Goal: Maintains adequate nutritional intake  Description: INTERVENTIONS:  - Monitor percentage of each meal consumed  - Identify factors contributing to decreased intake, treat as appropriate  - Assist with meals as needed  - Monitor I&O, weight, and lab values if indicated  - Obtain nutrition services referral as needed  Outcome: Progressing  Goal: Establish and maintain optimal ostomy function  Description: INTERVENTIONS:  - Assess bowel function  - Encourage oral fluids to ensure adequate hydration  - Administer IV fluids if ordered to ensure adequate hydration   - Administer ordered medications as needed  - Encourage mobilization and activity  - Nutrition services referral to assist patient with appropriate food choices  - Assess stoma site  - Consider wound care consult   Outcome: Progressing     Problem: METABOLIC, FLUID AND ELECTROLYTES - ADULT  Goal: Electrolytes maintained within normal limits  Description: INTERVENTIONS:  - Monitor labs and assess patient for signs and symptoms of electrolyte imbalances  - Administer electrolyte replacement as ordered  - Monitor response to electrolyte replacements, including repeat lab results as appropriate  - Instruct patient on fluid and nutrition as appropriate  Outcome: Progressing  Goal: Fluid balance maintained  Description: INTERVENTIONS:  - Monitor labs   - Monitor I/O and WT  - Instruct patient on fluid and nutrition as appropriate  - Assess for signs & symptoms of volume excess or deficit  Outcome: Progressing  Goal: Glucose maintained within target range  Description: INTERVENTIONS:  - Monitor Blood Glucose as ordered  - Assess for signs and symptoms of hyperglycemia and hypoglycemia  - Administer ordered medications to maintain glucose within target range  - Assess nutritional intake and initiate nutrition service referral as needed  Outcome: Progressing

## 2020-08-13 ENCOUNTER — APPOINTMENT (INPATIENT)
Dept: RADIOLOGY | Facility: HOSPITAL | Age: 75
DRG: 354 | End: 2020-08-13
Payer: COMMERCIAL

## 2020-08-13 LAB
ABO GROUP BLD BPU: NORMAL
ABO GROUP BLD BPU: NORMAL
ANION GAP SERPL CALCULATED.3IONS-SCNC: 12 MMOL/L (ref 4–13)
APTT PPP: 49 SECONDS (ref 23–37)
APTT PPP: 59 SECONDS (ref 23–37)
APTT PPP: 78 SECONDS (ref 23–37)
BPU ID: NORMAL
BPU ID: NORMAL
BUN SERPL-MCNC: 45 MG/DL (ref 5–25)
CALCIUM SERPL-MCNC: 8.5 MG/DL (ref 8.3–10.1)
CHLORIDE SERPL-SCNC: 100 MMOL/L (ref 100–108)
CO2 SERPL-SCNC: 31 MMOL/L (ref 21–32)
CREAT SERPL-MCNC: 3.62 MG/DL (ref 0.6–1.3)
CROSSMATCH: NORMAL
CROSSMATCH: NORMAL
ERYTHROCYTE [DISTWIDTH] IN BLOOD BY AUTOMATED COUNT: 14.2 % (ref 11.6–15.1)
GFR SERPL CREATININE-BSD FRML MDRD: 12 ML/MIN/1.73SQ M
GLUCOSE SERPL-MCNC: 103 MG/DL (ref 65–140)
GLUCOSE SERPL-MCNC: 91 MG/DL (ref 65–140)
GLUCOSE SERPL-MCNC: 94 MG/DL (ref 65–140)
GLUCOSE SERPL-MCNC: 95 MG/DL (ref 65–140)
GLUCOSE SERPL-MCNC: 99 MG/DL (ref 65–140)
GLUCOSE SERPL-MCNC: 99 MG/DL (ref 65–140)
HCT VFR BLD AUTO: 31.9 % (ref 34.8–46.1)
HGB BLD-MCNC: 9.9 G/DL (ref 11.5–15.4)
MCH RBC QN AUTO: 30 PG (ref 26.8–34.3)
MCHC RBC AUTO-ENTMCNC: 31 G/DL (ref 31.4–37.4)
MCV RBC AUTO: 97 FL (ref 82–98)
PLATELET # BLD AUTO: 263 THOUSANDS/UL (ref 149–390)
PMV BLD AUTO: 10.8 FL (ref 8.9–12.7)
POTASSIUM SERPL-SCNC: 3.8 MMOL/L (ref 3.5–5.3)
RBC # BLD AUTO: 3.3 MILLION/UL (ref 3.81–5.12)
SODIUM SERPL-SCNC: 143 MMOL/L (ref 136–145)
UNIT DISPENSE STATUS: NORMAL
UNIT DISPENSE STATUS: NORMAL
UNIT PRODUCT CODE: NORMAL
UNIT PRODUCT CODE: NORMAL
UNIT RH: NORMAL
UNIT RH: NORMAL
WBC # BLD AUTO: 6.21 THOUSAND/UL (ref 4.31–10.16)

## 2020-08-13 PROCEDURE — 99024 POSTOP FOLLOW-UP VISIT: CPT | Performed by: SURGERY

## 2020-08-13 PROCEDURE — 85730 THROMBOPLASTIN TIME PARTIAL: CPT | Performed by: SURGERY

## 2020-08-13 PROCEDURE — 71045 X-RAY EXAM CHEST 1 VIEW: CPT

## 2020-08-13 PROCEDURE — 80048 BASIC METABOLIC PNL TOTAL CA: CPT | Performed by: SURGERY

## 2020-08-13 PROCEDURE — 99232 SBSQ HOSP IP/OBS MODERATE 35: CPT | Performed by: PHYSICIAN ASSISTANT

## 2020-08-13 PROCEDURE — 85027 COMPLETE CBC AUTOMATED: CPT | Performed by: SURGERY

## 2020-08-13 PROCEDURE — 82948 REAGENT STRIP/BLOOD GLUCOSE: CPT

## 2020-08-13 RX ORDER — POTASSIUM CHLORIDE 14.9 MG/ML
20 INJECTION INTRAVENOUS ONCE
Status: COMPLETED | OUTPATIENT
Start: 2020-08-13 | End: 2020-08-13

## 2020-08-13 RX ORDER — LORAZEPAM 2 MG/ML
0.5 INJECTION INTRAMUSCULAR ONCE
Status: COMPLETED | OUTPATIENT
Start: 2020-08-13 | End: 2020-08-13

## 2020-08-13 RX ORDER — LIDOCAINE HYDROCHLORIDE 20 MG/ML
JELLY TOPICAL ONCE
Status: COMPLETED | OUTPATIENT
Start: 2020-08-13 | End: 2020-08-13

## 2020-08-13 RX ADMIN — SODIUM CHLORIDE, SODIUM GLUCONATE, SODIUM ACETATE, POTASSIUM CHLORIDE AND MAGNESIUM CHLORIDE 75 ML/HR: 526; 502; 368; 37; 30 INJECTION, SOLUTION INTRAVENOUS at 22:27

## 2020-08-13 RX ADMIN — HEPARIN SODIUM AND DEXTROSE 12 UNITS/KG/HR: 10000; 5 INJECTION INTRAVENOUS at 16:51

## 2020-08-13 RX ADMIN — METOROPROLOL TARTRATE 5 MG: 5 INJECTION, SOLUTION INTRAVENOUS at 09:46

## 2020-08-13 RX ADMIN — METOROPROLOL TARTRATE 5 MG: 5 INJECTION, SOLUTION INTRAVENOUS at 16:51

## 2020-08-13 RX ADMIN — LORAZEPAM 0.5 MG: 2 INJECTION INTRAMUSCULAR; INTRAVENOUS at 10:19

## 2020-08-13 RX ADMIN — POTASSIUM CHLORIDE 20 MEQ: 14.9 INJECTION, SOLUTION INTRAVENOUS at 10:22

## 2020-08-13 RX ADMIN — SODIUM CHLORIDE, SODIUM GLUCONATE, SODIUM ACETATE, POTASSIUM CHLORIDE AND MAGNESIUM CHLORIDE 75 ML/HR: 526; 502; 368; 37; 30 INJECTION, SOLUTION INTRAVENOUS at 05:05

## 2020-08-13 RX ADMIN — LIDOCAINE HYDROCHLORIDE 5 APPLICATION: 20 JELLY TOPICAL at 10:23

## 2020-08-13 NOTE — PROGRESS NOTES
UROLOGY PROGRESS NOTE   Patient Identifiers: Colby Knott (MRN [id number])  Date of Service: 8/13/2020    Subjective:    Awake and alert  Unfortunately her NG tube was replaced last night  Creatinine 3 62  WBC 6 21  Patient has  complaints of Postoperative pain and discomfort from NG tube         Objective:     VITALS:    Vitals:    08/13/20 0656   BP: 141/83   Pulse: 97   Resp: 16   Temp:    SpO2: 92%           LABS:  Lab Results   Component Value Date    HGB 9 9 (L) 08/13/2020    HCT 31 9 (L) 08/13/2020    WBC 6 21 08/13/2020     08/13/2020   ]    Lab Results   Component Value Date     12/17/2015    K 3 8 08/13/2020     08/13/2020    CO2 31 08/13/2020    BUN 45 (H) 08/13/2020    CREATININE 3 62 (H) 08/13/2020    CALCIUM 8 5 08/13/2020    GLUCOSE 138 10/04/2016   ]        INPATIENT MEDS:    Current Facility-Administered Medications:     benzocaine (HURRICAINE) 20 % mucosal spray 2 spray, 2 spray, Mucosal, Once, Nimo Mckeon MD, Stopped at 08/12/20 1544    heparin (porcine) 25,000 units in 250 mL infusion (premix), 3-30 Units/kg/hr (Order-Specific), Intravenous, Titrated, Alexandre Alexander MD, Last Rate: 10 2 mL/hr at 08/13/20 0545, 12 Units/kg/hr at 08/13/20 0545    HYDROmorphone (DILAUDID) injection 0 2 mg, 0 2 mg, Intravenous, Q3H PRN, Wilma Thomas MD, 0 2 mg at 08/12/20 0330    HYDROmorphone (DILAUDID) injection 0 5 mg, 0 5 mg, Intravenous, Q3H PRN, Wilma Thomas MD, 0 5 mg at 08/12/20 0708    HYDROmorphone (DILAUDID) injection 0 5 mg, 0 5 mg, Intravenous, Q3H PRN, Wilma Thomas MD, 0 5 mg at 08/11/20 4979    metoprolol (LOPRESSOR) injection 5 mg, 5 mg, Intravenous, Q8H, Wilma Thomas MD, 5 mg at 08/12/20 2331    multi-electrolyte (PLASMALYTE-A/ISOLYTE-S PH 7 4) IV solution, 75 mL/hr, Intravenous, Continuous, Pino Swift DO, Last Rate: 75 mL/hr at 08/13/20 0505, 75 mL/hr at 08/13/20 0505    ondansetron (ZOFRAN) injection 4 mg, 4 mg, Intravenous, Q4H PRN, Eleno Bernal MD, 4 mg at 08/12/20 1959    potassium chloride 20 mEq IVPB (premix), 20 mEq, Intravenous, Once, Karolina Duke MD    prothrombin complex conc human (KCENTRA) 3,000 Units, 35 Units/kg, Intravenous, Once, Eleno Bernal MD      Physical Exam:   /83   Pulse 97   Temp 98 °F (36 7 °C) (Oral)   Resp 16   Ht 5' (1 524 m)   Wt 89 2 kg (196 lb 10 4 oz)   SpO2 92%   BMI 38 41 kg/m²   GEN: no acute distress    RESP: breathing comfortably with no accessory muscle use    ABD: soft, appropriately tender to palpation, non-distended and mildly distended   INCISION: clean, dry, intact stoma pink and viable  EXT: no significant peripheral edema     ILEAL CONDUIT: in place draining clear yellow urine  no clots and       RADIOLOGY:   FLUOROSCOPY LOOPOGRAM   IMPRESSION:     Loopogram demonstrating satisfactory appearance of an ileal conduit with free reflux of administered contrast into bilateral ureters, renal pelves and calyces  No ureteral stricture or filling defects seen      Assessment:   1  Small-bowel obstruction  2  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

## 2020-08-13 NOTE — RESTORATIVE TECHNICIAN NOTE
Restorative Specialist Mobility Note       Activity: Ambulate in room     Assistive Device: Front wheel walker        Repositioned: Sitting, Up in chair

## 2020-08-13 NOTE — PROGRESS NOTES
After Potassium infusion this morning, patient does not complain of pain in LUE where potassium was running  Area around looks pink, but upon assessment better after interventions

## 2020-08-13 NOTE — PROGRESS NOTES
Progress Note - Nadja Ram 77 y o  female MRN: [id number]    Unit/Bed#: [facility] 507-01 Encounter: [id number]      Assessment:  77 F with incarcerated parastomal hernia s/p ex-lap, reduction and parastomal hernia repair with Phasix mesh, LUANN secondary to obstructive uropathy        Plan:  Continue heparin gtt  Continue NG tube/NPO for ileus  Continue elli drain  Continue ivf per nephro, appreciate recs  ambulate    Subjective:   C/o mild pain near incision  Resting comfortably  Denies BM/F yesterday, had one episode of emesis and NG tube replaced  Tachy to 140s yesterday  Restarted on IV metoprolol with improvement in HR  KUB consistent with ileus  Loopogram unremarkable  Objective:     Vitals: Blood pressure 129/78, pulse 99, temperature 98 °F (36 7 °C), temperature source Oral, resp  rate 18, height 5' (1 524 m), weight 89 2 kg (196 lb 10 4 oz), SpO2 96 %, not currently breastfeeding  ,Body mass index is 38 41 kg/m²  Intake/Output Summary (Last 24 hours) at 8/13/2020 0516  Last data filed at 8/13/2020 0505  Gross per 24 hour   Intake 2126 31 ml   Output 3310 ml   Net -1183 69 ml       Physical Exam  General: NAD  HEENT: NC/AT  MMM  Cv: RRR  Lungs: normal effort  Ab: Soft, mild tender near incision  Incision c/d/i  ELLI in place ss output  Ostomy right abdomen pink and healthy  Ex: no CCE  Neuro: alert, answers questions appropriately       Scheduled Meds:  Current Facility-Administered Medications   Medication Dose Route Frequency Provider Last Rate    benzocaine  2 spray Mucosal Once Freida Razo MD      heparin (porcine)  3-30 Units/kg/hr (Order-Specific) Intravenous Titrated Luis Mahmood MD 10 Units/kg/hr (08/12/20 2216)    HYDROmorphone  0 2 mg Intravenous Q3H PRN Jesus Pagan MD      HYDROmorphone  0 5 mg Intravenous Q3H PRN Jesus Pagan MD      HYDROmorphone  0 5 mg Intravenous Q3H PRN Jesus Pagan MD      metoprolol  5 mg Intravenous Maria Luz Preciado MD     [address] [place] [address] multi-electrolyte  75 mL/hr Intravenous Continuous Raffaele Bolaños Swift, DO 75 mL/hr (08/13/20 0505)    ondansetron  4 mg Intravenous Q4H PRN Beryl Kelly MD      Kcentra (PROTHROMBIN)  35 Units/kg Intravenous Once Beryl Kelly MD       Continuous Infusions:heparin (porcine), 3-30 Units/kg/hr (Order-Specific), Last Rate: 10 Units/kg/hr (08/12/20 0451)  multi-electrolyte, 75 mL/hr, Last Rate: 75 mL/hr (08/13/20 0505)      PRN Meds:  HYDROmorphone    HYDROmorphone    HYDROmorphone    ondansetron      Invasive Devices     Peripheral Intravenous Line            Peripheral IV 08/09/20 Right Antecubital 3 days    Peripheral IV 08/11/20 Right Hand 1 day          Drain            Urostomy Ileal conduit RUQ 1408 days    Closed/Suction Drain Left LLQ Bulb 19 Fr  3 days    NG/OG/Enteral Tube Nasogastric Right nares 1 day    NG/OG Tube Nasogastric Right nares less than 1 day                Lab, Imaging and other studies: I have personally reviewed pertinent reports      VTE Pharmacologic Prophylaxis: Heparin  VTE Mechanical Prophylaxis: sequential compression device

## 2020-08-13 NOTE — RESTORATIVE TECHNICIAN NOTE
Restorative Specialist Mobility Note       Activity: Stand at bedside, Turn, Chair     Assistive Device: Front wheel walker        Repositioned: Sitting, Up in chair

## 2020-08-13 NOTE — PROGRESS NOTES
Patient was started on IV potassium  Prior, IV was flushed and working  Minutes after, patient stated that arm was burning  Assessed IV site, noted slight pink around IV site  Potassium stopped, IV flushed, removed, and notified provider on unit [place], [place]  Stated to monitor area  Cool compress applied followed by warm compress  Will continue to monitor

## 2020-08-13 NOTE — PLAN OF CARE
Problem: Potential for Falls  Goal: Patient will remain free of falls  Description: INTERVENTIONS:  - Assess patient frequently for physical needs  -  Identify cognitive and physical deficits and behaviors that affect risk of falls    -  [place] fall precautions as indicated by assessment   - Educate patient/family on patient safety including physical limitations  - Instruct patient to call for assistance with activity based on assessment  - Modify environment to reduce risk of injury  - Consider OT/PT consult to assist with strengthening/mobility  Outcome: Progressing     Problem: Prexisting or High Potential for Compromised Skin Integrity  Goal: Skin integrity is maintained or improved  Description: INTERVENTIONS:  - Identify patients at risk for skin breakdown  - Assess and monitor skin integrity  - Assess and monitor nutrition and hydration status  - Monitor labs   - Assess for incontinence   - Turn and reposition patient  - Assist with mobility/ambulation  - Relieve pressure over bony prominences  - Avoid friction and shearing  - Provide appropriate hygiene as needed including keeping skin clean and dry  - Evaluate need for skin moisturizer/barrier cream  - Collaborate with interdisciplinary team   - Patient/family teaching  - Consider wound care consult   Outcome: Progressing     Problem: CARDIOVASCULAR - ADULT  Goal: Maintains optimal cardiac output and hemodynamic stability  Description: INTERVENTIONS:  - Monitor I/O, vital signs and rhythm  - Monitor for S/S and trends of decreased cardiac output  - Administer and titrate ordered vasoactive medications to optimize hemodynamic stability  - Assess quality of pulses, skin color and temperature  - Assess for signs of decreased coronary artery perfusion  - Instruct patient to report change in severity of symptoms  Outcome: Progressing     Problem: RESPIRATORY - ADULT  Goal: Achieves optimal ventilation and oxygenation  Description: INTERVENTIONS:  - Assess for changes in respiratory status  - Assess for changes in mentation and behavior  - Position to facilitate oxygenation and minimize respiratory effort  - Oxygen administered by appropriate delivery if ordered  - Initiate smoking cessation education as indicated  - Encourage broncho-pulmonary hygiene including cough, deep breathe, Incentive Spirometry  - Assess the need for suctioning and aspirate as needed  - Assess and instruct to report SOB or any respiratory difficulty  - Respiratory Therapy support as indicated  Outcome: Progressing     Problem: GASTROINTESTINAL - ADULT  Goal: Minimal or absence of nausea and/or vomiting  Description: INTERVENTIONS:  - Administer IV fluids if ordered to ensure adequate hydration  - Maintain NPO status until nausea and vomiting are resolved  - Nasogastric tube if ordered  - Administer ordered antiemetic medications as needed  - Provide nonpharmacologic comfort measures as appropriate  - Advance diet as tolerated, if ordered  - Consider nutrition services referral to assist patient with adequate nutrition and appropriate food choices  Outcome: Progressing  Goal: Maintains or returns to baseline bowel function  Description: INTERVENTIONS:  - Assess bowel function  - Encourage oral fluids to ensure adequate hydration  - Administer IV fluids if ordered to ensure adequate hydration  - Administer ordered medications as needed  - Encourage mobilization and activity  - Consider nutritional services referral to assist patient with adequate nutrition and appropriate food choices  Outcome: Progressing  Goal: Maintains adequate nutritional intake  Description: INTERVENTIONS:  - Monitor percentage of each meal consumed  - Identify factors contributing to decreased intake, treat as appropriate  - Assist with meals as needed  - Monitor I&O, weight, and lab values if indicated  - Obtain nutrition services referral as needed  Outcome: Progressing  Goal: Establish and maintain optimal ostomy function  Description: INTERVENTIONS:  - Assess bowel function  - Encourage oral fluids to ensure adequate hydration  - Administer IV fluids if ordered to ensure adequate hydration   - Administer ordered medications as needed  - Encourage mobilization and activity  - Nutrition services referral to assist patient with appropriate food choices  - Assess stoma site  - Consider wound care consult   Outcome: Progressing     Problem: METABOLIC, FLUID AND ELECTROLYTES - ADULT  Goal: Electrolytes maintained within normal limits  Description: INTERVENTIONS:  - Monitor labs and assess patient for signs and symptoms of electrolyte imbalances  - Administer electrolyte replacement as ordered  - Monitor response to electrolyte replacements, including repeat lab results as appropriate  - Instruct patient on fluid and nutrition as appropriate  Outcome: Progressing  Goal: Fluid balance maintained  Description: INTERVENTIONS:  - Monitor labs   - Monitor I/O and WT  - Instruct patient on fluid and nutrition as appropriate  - Assess for signs & symptoms of volume excess or deficit  Outcome: Progressing  Goal: Glucose maintained within target range  Description: INTERVENTIONS:  - Monitor Blood Glucose as ordered  - Assess for signs and symptoms of hyperglycemia and hypoglycemia  - Administer ordered medications to maintain glucose within target range  - Assess nutritional intake and initiate nutrition service referral as needed  Outcome: Progressing     Problem: CARDIOVASCULAR - ADULT  Goal: Absence of cardiac dysrhythmias or at baseline rhythm  Description: INTERVENTIONS:  - Continuous cardiac monitoring, vital signs, obtain 12 lead EKG if ordered  - Administer antiarrhythmic and heart rate control medications as ordered  - Monitor electrolytes and administer replacement therapy as ordered  Outcome: Not Progressing

## 2020-08-13 NOTE — QUICK NOTE
Patient had sneezed and NGT came out  Discussed with surgery team, who would like reinsertion    Following administration of lidocaine jelly, an attempt to pass an 18F was made and was unsuccessful  Therefore, a 16F NGT was more easily, and successfully passed with return of gastric contents  Placed to suction  Will confirm with Xray

## 2020-08-13 NOTE — QUICK NOTE
Nurse-Patient-Provider rounds were completed with the patient's nurse today, Ayesha Hermosillo   We discussed the plan is to   - continue NGT  - continue step down/Tele given Afib last evening - currently sinus  - heparin gtt  - Nephrology following for CKD/LUANN    We reviewed all of the invasive devices/lines/telemetry orders  DVT prophylaxis:  - heparin gtt    Diet:  - NPO    Pain Assessment / Plan:  - Continue current pain management regimen    Mobility Assessment / Plan:  - Activity as tolerated          Bryn Burgos PA-C

## 2020-08-14 ENCOUNTER — TELEPHONE (OUTPATIENT)
Dept: OTHER | Facility: HOSPITAL | Age: 75
End: 2020-08-14

## 2020-08-14 ENCOUNTER — APPOINTMENT (INPATIENT)
Dept: RADIOLOGY | Facility: HOSPITAL | Age: 75
DRG: 354 | End: 2020-08-14
Payer: COMMERCIAL

## 2020-08-14 ENCOUNTER — APPOINTMENT (OUTPATIENT)
Dept: RADIOLOGY | Facility: HOSPITAL | Age: 75
DRG: 354 | End: 2020-08-14
Payer: COMMERCIAL

## 2020-08-14 LAB
ANION GAP SERPL CALCULATED.3IONS-SCNC: 8 MMOL/L (ref 4–13)
APTT PPP: 64 SECONDS (ref 23–37)
APTT PPP: 73 SECONDS (ref 23–37)
BASOPHILS # BLD MANUAL: 0 THOUSAND/UL (ref 0–0.1)
BASOPHILS NFR MAR MANUAL: 0 % (ref 0–1)
BUN SERPL-MCNC: 46 MG/DL (ref 5–25)
CALCIUM SERPL-MCNC: 8.5 MG/DL (ref 8.3–10.1)
CHLORIDE SERPL-SCNC: 103 MMOL/L (ref 100–108)
CO2 SERPL-SCNC: 33 MMOL/L (ref 21–32)
CREAT SERPL-MCNC: 3.64 MG/DL (ref 0.6–1.3)
EOSINOPHIL # BLD MANUAL: 0.11 THOUSAND/UL (ref 0–0.4)
EOSINOPHIL NFR BLD MANUAL: 2 % (ref 0–6)
ERYTHROCYTE [DISTWIDTH] IN BLOOD BY AUTOMATED COUNT: 14.2 % (ref 11.6–15.1)
GFR SERPL CREATININE-BSD FRML MDRD: 12 ML/MIN/1.73SQ M
GLUCOSE SERPL-MCNC: 103 MG/DL (ref 65–140)
GLUCOSE SERPL-MCNC: 109 MG/DL (ref 65–140)
GLUCOSE SERPL-MCNC: 111 MG/DL (ref 65–140)
GLUCOSE SERPL-MCNC: 88 MG/DL (ref 65–140)
HCT VFR BLD AUTO: 27 % (ref 34.8–46.1)
HGB BLD-MCNC: 8.3 G/DL (ref 11.5–15.4)
LYMPHOCYTES # BLD AUTO: 0.16 THOUSAND/UL (ref 0.6–4.47)
LYMPHOCYTES # BLD AUTO: 3 % (ref 14–44)
MAGNESIUM SERPL-MCNC: 2.4 MG/DL (ref 1.6–2.6)
MCH RBC QN AUTO: 30.3 PG (ref 26.8–34.3)
MCHC RBC AUTO-ENTMCNC: 30.7 G/DL (ref 31.4–37.4)
MCV RBC AUTO: 99 FL (ref 82–98)
MONOCYTES # BLD AUTO: 0.22 THOUSAND/UL (ref 0–1.22)
MONOCYTES NFR BLD: 4 % (ref 4–12)
NEUTROPHILS # BLD MANUAL: 4.9 THOUSAND/UL (ref 1.85–7.62)
NEUTS BAND NFR BLD MANUAL: 5 % (ref 0–8)
NEUTS SEG NFR BLD AUTO: 86 % (ref 43–75)
NRBC BLD AUTO-RTO: 0 /100 WBCS
PHOSPHATE SERPL-MCNC: 4 MG/DL (ref 2.3–4.1)
PLATELET # BLD AUTO: 210 THOUSANDS/UL (ref 149–390)
PLATELET BLD QL SMEAR: ADEQUATE
PMV BLD AUTO: 10.4 FL (ref 8.9–12.7)
POTASSIUM SERPL-SCNC: 4 MMOL/L (ref 3.5–5.3)
RBC # BLD AUTO: 2.74 MILLION/UL (ref 3.81–5.12)
RBC MORPH BLD: NORMAL
SODIUM SERPL-SCNC: 144 MMOL/L (ref 136–145)
WBC # BLD AUTO: 5.38 THOUSAND/UL (ref 4.31–10.16)

## 2020-08-14 PROCEDURE — 76937 US GUIDE VASCULAR ACCESS: CPT

## 2020-08-14 PROCEDURE — C1751 CATH, INF, PER/CENT/MIDLINE: HCPCS

## 2020-08-14 PROCEDURE — 76937 US GUIDE VASCULAR ACCESS: CPT | Performed by: RADIOLOGY

## 2020-08-14 PROCEDURE — 99232 SBSQ HOSP IP/OBS MODERATE 35: CPT | Performed by: UROLOGY

## 2020-08-14 PROCEDURE — 77001 FLUOROGUIDE FOR VEIN DEVICE: CPT

## 2020-08-14 PROCEDURE — C1894 INTRO/SHEATH, NON-LASER: HCPCS

## 2020-08-14 PROCEDURE — 74018 RADEX ABDOMEN 1 VIEW: CPT

## 2020-08-14 PROCEDURE — 99232 SBSQ HOSP IP/OBS MODERATE 35: CPT | Performed by: INTERNAL MEDICINE

## 2020-08-14 PROCEDURE — 02H633Z INSERTION OF INFUSION DEVICE INTO RIGHT ATRIUM, PERCUTANEOUS APPROACH: ICD-10-PCS | Performed by: RADIOLOGY

## 2020-08-14 PROCEDURE — 84100 ASSAY OF PHOSPHORUS: CPT | Performed by: SURGERY

## 2020-08-14 PROCEDURE — 85730 THROMBOPLASTIN TIME PARTIAL: CPT | Performed by: SURGERY

## 2020-08-14 PROCEDURE — 36556 INSERT NON-TUNNEL CV CATH: CPT

## 2020-08-14 PROCEDURE — 85007 BL SMEAR W/DIFF WBC COUNT: CPT | Performed by: SURGERY

## 2020-08-14 PROCEDURE — 82948 REAGENT STRIP/BLOOD GLUCOSE: CPT

## 2020-08-14 PROCEDURE — NC001 PR NO CHARGE: Performed by: PHYSICIAN ASSISTANT

## 2020-08-14 PROCEDURE — 36556 INSERT NON-TUNNEL CV CATH: CPT | Performed by: RADIOLOGY

## 2020-08-14 PROCEDURE — 85730 THROMBOPLASTIN TIME PARTIAL: CPT | Performed by: INTERNAL MEDICINE

## 2020-08-14 PROCEDURE — 99024 POSTOP FOLLOW-UP VISIT: CPT | Performed by: SURGERY

## 2020-08-14 PROCEDURE — 83735 ASSAY OF MAGNESIUM: CPT | Performed by: SURGERY

## 2020-08-14 PROCEDURE — 77001 FLUOROGUIDE FOR VEIN DEVICE: CPT | Performed by: RADIOLOGY

## 2020-08-14 PROCEDURE — 85027 COMPLETE CBC AUTOMATED: CPT | Performed by: SURGERY

## 2020-08-14 PROCEDURE — 80048 BASIC METABOLIC PNL TOTAL CA: CPT | Performed by: SURGERY

## 2020-08-14 RX ORDER — LIDOCAINE WITH 8.4% SOD BICARB 0.9%(10ML)
SYRINGE (ML) INJECTION CODE/TRAUMA/SEDATION MEDICATION
Status: COMPLETED | OUTPATIENT
Start: 2020-08-14 | End: 2020-08-14

## 2020-08-14 RX ADMIN — HEPARIN SODIUM AND DEXTROSE 14 UNITS/KG/HR: 10000; 5 INJECTION INTRAVENOUS at 15:25

## 2020-08-14 RX ADMIN — SODIUM CHLORIDE, SODIUM GLUCONATE, SODIUM ACETATE, POTASSIUM CHLORIDE AND MAGNESIUM CHLORIDE 125 ML/HR: 526; 502; 368; 37; 30 INJECTION, SOLUTION INTRAVENOUS at 09:01

## 2020-08-14 RX ADMIN — SODIUM CHLORIDE, SODIUM GLUCONATE, SODIUM ACETATE, POTASSIUM CHLORIDE AND MAGNESIUM CHLORIDE 125 ML/HR: 526; 502; 368; 37; 30 INJECTION, SOLUTION INTRAVENOUS at 19:49

## 2020-08-14 RX ADMIN — METOROPROLOL TARTRATE 5 MG: 5 INJECTION, SOLUTION INTRAVENOUS at 09:02

## 2020-08-14 RX ADMIN — Medication 10 ML: at 18:03

## 2020-08-14 RX ADMIN — METOROPROLOL TARTRATE 5 MG: 5 INJECTION, SOLUTION INTRAVENOUS at 23:46

## 2020-08-14 RX ADMIN — METOROPROLOL TARTRATE 5 MG: 5 INJECTION, SOLUTION INTRAVENOUS at 15:24

## 2020-08-14 RX ADMIN — METOROPROLOL TARTRATE 5 MG: 5 INJECTION, SOLUTION INTRAVENOUS at 00:29

## 2020-08-14 NOTE — PROGRESS NOTES
Pt to get a PICC line today, but has not yet received  Spoke with Dany Green regarding inability to get blood for PTT test that was due at 1130  Patient was stuck multiple times unsuccessful yesterday, resulting in anesthesia to place an IV to get labs this morning  Informed that it is okay to continue to run heparin gtt without the PTT, as she was going to get central line access and be able to get labs once placed  She was therapeutic x1 with a pTT of 64 at 0449

## 2020-08-14 NOTE — PROGRESS NOTES
Pt evaluated by PICC team for bedside PICC placement  CKD 5 with Creat of 3 64  Discussed with Dr Deepa Grewal from nephrology  It is preferred pt go to IR for tunneled PICC line  Dr Fuentes Restrepo notified  Picc team signing off

## 2020-08-14 NOTE — RESTORATIVE TECHNICIAN NOTE
Restorative Specialist Mobility Note       Activity: Ambulate in [place], Chair     Assistive Device: Front wheel walker        Repositioned: Sitting, Up in chair, Other (Comment)(Chair Alarm)

## 2020-08-14 NOTE — PLAN OF CARE
Problem: Potential for Falls  Goal: Patient will remain free of falls  Description: INTERVENTIONS:  - Assess patient frequently for physical needs  -  Identify cognitive and physical deficits and behaviors that affect risk of falls    -  [place] fall precautions as indicated by assessment   - Educate patient/family on patient safety including physical limitations  - Instruct patient to call for assistance with activity based on assessment  - Modify environment to reduce risk of injury  - Consider OT/PT consult to assist with strengthening/mobility  Outcome: Progressing     Problem: Prexisting or High Potential for Compromised Skin Integrity  Goal: Skin integrity is maintained or improved  Description: INTERVENTIONS:  - Identify patients at risk for skin breakdown  - Assess and monitor skin integrity  - Assess and monitor nutrition and hydration status  - Monitor labs   - Assess for incontinence   - Turn and reposition patient  - Assist with mobility/ambulation  - Relieve pressure over bony prominences  - Avoid friction and shearing  - Provide appropriate hygiene as needed including keeping skin clean and dry  - Evaluate need for skin moisturizer/barrier cream  - Collaborate with interdisciplinary team   - Patient/family teaching  - Consider wound care consult   Outcome: Progressing     Problem: CARDIOVASCULAR - ADULT  Goal: Maintains optimal cardiac output and hemodynamic stability  Description: INTERVENTIONS:  - Monitor I/O, vital signs and rhythm  - Monitor for S/S and trends of decreased cardiac output  - Administer and titrate ordered vasoactive medications to optimize hemodynamic stability  - Assess quality of pulses, skin color and temperature  - Assess for signs of decreased coronary artery perfusion  - Instruct patient to report change in severity of symptoms  Outcome: Progressing  Goal: Absence of cardiac dysrhythmias or at baseline rhythm  Description: INTERVENTIONS:  - Continuous cardiac monitoring, vital signs, obtain 12 lead EKG if ordered  - Administer antiarrhythmic and heart rate control medications as ordered  - Monitor electrolytes and administer replacement therapy as ordered  Outcome: Progressing     Problem: RESPIRATORY - ADULT  Goal: Achieves optimal ventilation and oxygenation  Description: INTERVENTIONS:  - Assess for changes in respiratory status  - Assess for changes in mentation and behavior  - Position to facilitate oxygenation and minimize respiratory effort  - Oxygen administered by appropriate delivery if ordered  - Initiate smoking cessation education as indicated  - Encourage broncho-pulmonary hygiene including cough, deep breathe, Incentive Spirometry  - Assess the need for suctioning and aspirate as needed  - Assess and instruct to report SOB or any respiratory difficulty  - Respiratory Therapy support as indicated  Outcome: Progressing     Problem: GASTROINTESTINAL - ADULT  Goal: Minimal or absence of nausea and/or vomiting  Description: INTERVENTIONS:  - Administer IV fluids if ordered to ensure adequate hydration  - Maintain NPO status until nausea and vomiting are resolved  - Nasogastric tube if ordered  - Administer ordered antiemetic medications as needed  - Provide nonpharmacologic comfort measures as appropriate  - Advance diet as tolerated, if ordered  - Consider nutrition services referral to assist patient with adequate nutrition and appropriate food choices  Outcome: Progressing  Goal: Maintains or returns to baseline bowel function  Description: INTERVENTIONS:  - Assess bowel function  - Encourage oral fluids to ensure adequate hydration  - Administer IV fluids if ordered to ensure adequate hydration  - Administer ordered medications as needed  - Encourage mobilization and activity  - Consider nutritional services referral to assist patient with adequate nutrition and appropriate food choices  Outcome: Progressing  Goal: Maintains adequate nutritional intake  Description: INTERVENTIONS:  - Monitor percentage of each meal consumed  - Identify factors contributing to decreased intake, treat as appropriate  - Assist with meals as needed  - Monitor I&O, weight, and lab values if indicated  - Obtain nutrition services referral as needed  Outcome: Progressing  Goal: Establish and maintain optimal ostomy function  Description: INTERVENTIONS:  - Assess bowel function  - Encourage oral fluids to ensure adequate hydration  - Administer IV fluids if ordered to ensure adequate hydration   - Administer ordered medications as needed  - Encourage mobilization and activity  - Nutrition services referral to assist patient with appropriate food choices  - Assess stoma site  - Consider wound care consult   Outcome: Progressing     Problem: METABOLIC, FLUID AND ELECTROLYTES - ADULT  Goal: Electrolytes maintained within normal limits  Description: INTERVENTIONS:  - Monitor labs and assess patient for signs and symptoms of electrolyte imbalances  - Administer electrolyte replacement as ordered  - Monitor response to electrolyte replacements, including repeat lab results as appropriate  - Instruct patient on fluid and nutrition as appropriate  Outcome: Progressing  Goal: Fluid balance maintained  Description: INTERVENTIONS:  - Monitor labs   - Monitor I/O and WT  - Instruct patient on fluid and nutrition as appropriate  - Assess for signs & symptoms of volume excess or deficit  Outcome: Progressing  Goal: Glucose maintained within target range  Description: INTERVENTIONS:  - Monitor Blood Glucose as ordered  - Assess for signs and symptoms of hyperglycemia and hypoglycemia  - Administer ordered medications to maintain glucose within target range  - Assess nutritional intake and initiate nutrition service referral as needed  Outcome: Progressing     Problem: Nutrition/Hydration-ADULT  Goal: Nutrient/Hydration intake appropriate for improving, restoring or maintaining nutritional needs  Description: Monitor and assess patient's nutrition/hydration status for malnutrition  Collaborate with interdisciplinary team and initiate plan and interventions as ordered  Monitor patient's weight and dietary intake as ordered or per policy  Utilize nutrition screening tool and intervene as necessary  Determine patient's food preferences and provide high-protein, high-caloric foods as appropriate  INTERVENTIONS:  - Monitor oral intake, urinary output, labs, and treatment plans  - Assess nutrition and hydration status and recommend course of action  - Evaluate amount of meals eaten  - Assist patient with eating if necessary   - Allow adequate time for meals  - Recommend/ encourage appropriate diets, oral nutritional supplements, and vitamin/mineral supplements  - Order, calculate, and assess calorie counts as needed  - Recommend, monitor, and adjust tube feedings and TPN/PPN based on assessed needs  - Assess need for intravenous fluids  - Provide specific nutrition/hydration education as appropriate  - Include patient/family/caregiver in decisions related to nutrition  Outcome: Progressing     Problem: Potential for Falls  Goal: Patient will remain free of falls  Description: INTERVENTIONS:  - Assess patient frequently for physical needs  -  Identify cognitive and physical deficits and behaviors that affect risk of falls    -  [place] fall precautions as indicated by assessment   - Educate patient/family on patient safety including physical limitations  - Instruct patient to call for assistance with activity based on assessment  - Modify environment to reduce risk of injury  - Consider OT/PT consult to assist with strengthening/mobility  8/14/2020 0004 by Bismark Arita RN  Outcome: Progressing  8/14/2020 0004 by Bismark Arita RN  Outcome: Progressing     Problem: Prexisting or High Potential for Compromised Skin Integrity  Goal: Skin integrity is maintained or improved  Description: INTERVENTIONS:  - Identify patients at risk for skin breakdown  - Assess and monitor skin integrity  - Assess and monitor nutrition and hydration status  - Monitor labs   - Assess for incontinence   - Turn and reposition patient  - Assist with mobility/ambulation  - Relieve pressure over bony prominences  - Avoid friction and shearing  - Provide appropriate hygiene as needed including keeping skin clean and dry  - Evaluate need for skin moisturizer/barrier cream  - Collaborate with interdisciplinary team   - Patient/family teaching  - Consider wound care consult   8/14/2020 0004 by Edgardo Livingston RN  Outcome: Progressing  8/14/2020 0004 by Edgardo Livingston RN  Outcome: Progressing     Problem: CARDIOVASCULAR - ADULT  Goal: Maintains optimal cardiac output and hemodynamic stability  Description: INTERVENTIONS:  - Monitor I/O, vital signs and rhythm  - Monitor for S/S and trends of decreased cardiac output  - Administer and titrate ordered vasoactive medications to optimize hemodynamic stability  - Assess quality of pulses, skin color and temperature  - Assess for signs of decreased coronary artery perfusion  - Instruct patient to report change in severity of symptoms  8/14/2020 0004 by Edgardo Livingston RN  Outcome: Progressing  8/14/2020 0004 by Edgardo Livingston RN  Outcome: Progressing  Goal: Absence of cardiac dysrhythmias or at baseline rhythm  Description: INTERVENTIONS:  - Continuous cardiac monitoring, vital signs, obtain 12 lead EKG if ordered  - Administer antiarrhythmic and heart rate control medications as ordered  - Monitor electrolytes and administer replacement therapy as ordered  8/14/2020 0004 by Edgardo Livingston RN  Outcome: Progressing  8/14/2020 0004 by Edgardo Livingston RN  Outcome: Progressing     Problem: RESPIRATORY - ADULT  Goal: Achieves optimal ventilation and oxygenation  Description: INTERVENTIONS:  - Assess for changes in respiratory status  - Assess for changes in mentation and behavior  - Position to facilitate oxygenation and minimize respiratory effort  - Oxygen administered by appropriate delivery if ordered  - Initiate smoking cessation education as indicated  - Encourage broncho-pulmonary hygiene including cough, deep breathe, Incentive Spirometry  - Assess the need for suctioning and aspirate as needed  - Assess and instruct to report SOB or any respiratory difficulty  - Respiratory Therapy support as indicated  8/14/2020 0004 by Shobha Kaufman RN  Outcome: Progressing  8/14/2020 0004 by Shobha Kaufman RN  Outcome: Progressing     Problem: GASTROINTESTINAL - ADULT  Goal: Minimal or absence of nausea and/or vomiting  Description: INTERVENTIONS:  - Administer IV fluids if ordered to ensure adequate hydration  - Maintain NPO status until nausea and vomiting are resolved  - Nasogastric tube if ordered  - Administer ordered antiemetic medications as needed  - Provide nonpharmacologic comfort measures as appropriate  - Advance diet as tolerated, if ordered  - Consider nutrition services referral to assist patient with adequate nutrition and appropriate food choices  8/14/2020 0004 by Shobha Kaufman RN  Outcome: Progressing  8/14/2020 0004 by Shobha Kaufman RN  Outcome: Progressing  Goal: Establish and maintain optimal ostomy function  Description: INTERVENTIONS:  - Assess bowel function  - Encourage oral fluids to ensure adequate hydration  - Administer IV fluids if ordered to ensure adequate hydration   - Administer ordered medications as needed  - Encourage mobilization and activity  - Nutrition services referral to assist patient with appropriate food choices  - Assess stoma site  - Consider wound care consult   8/14/2020 0004 by Shobha Kaufman RN  Outcome: Progressing  8/14/2020 0004 by Shobha Kaufman RN  Outcome: Progressing     Problem: METABOLIC, FLUID AND ELECTROLYTES - ADULT  Goal: Electrolytes maintained within normal limits  Description: INTERVENTIONS:  - Monitor labs and assess patient for signs and symptoms of electrolyte imbalances  - Administer electrolyte replacement as ordered  - Monitor response to electrolyte replacements, including repeat lab results as appropriate  - Instruct patient on fluid and nutrition as appropriate  8/14/2020 0004 by Olga Lidia Qureshi RN  Outcome: Progressing  8/14/2020 0004 by Olga Lidia Qureshi RN  Outcome: Progressing  Goal: Fluid balance maintained  Description: INTERVENTIONS:  - Monitor labs   - Monitor I/O and WT  - Instruct patient on fluid and nutrition as appropriate  - Assess for signs & symptoms of volume excess or deficit  8/14/2020 0004 by Olga Lidia Qureshi RN  Outcome: Progressing  8/14/2020 0004 by Olga Lidia Qureshi RN  Outcome: Progressing  Goal: Glucose maintained within target range  Description: INTERVENTIONS:  - Monitor Blood Glucose as ordered  - Assess for signs and symptoms of hyperglycemia and hypoglycemia  - Administer ordered medications to maintain glucose within target range  - Assess nutritional intake and initiate nutrition service referral as needed  8/14/2020 0004 by Olga Lidia Quershi RN  Outcome: Progressing  8/14/2020 0004 by Olga Lidia Qureshi RN  Outcome: Progressing     Problem: Nutrition/Hydration-ADULT  Goal: Nutrient/Hydration intake appropriate for improving, restoring or maintaining nutritional needs  Description: Monitor and assess patient's nutrition/hydration status for malnutrition  Collaborate with interdisciplinary team and initiate plan and interventions as ordered  Monitor patient's weight and dietary intake as ordered or per policy  Utilize nutrition screening tool and intervene as necessary  Determine patient's food preferences and provide high-protein, high-caloric foods as appropriate       INTERVENTIONS:  - Monitor oral intake, urinary output, labs, and treatment plans  - Assess nutrition and hydration status and recommend course of action  - Evaluate amount of meals eaten  - Assist patient with eating if necessary   - Allow adequate time for meals  - Recommend/ encourage appropriate diets, oral nutritional supplements, and vitamin/mineral supplements  - Order, calculate, and assess calorie counts as needed  - Recommend, monitor, and adjust tube feedings and TPN/PPN based on assessed needs  - Assess need for intravenous fluids  - Provide specific nutrition/hydration education as appropriate  - Include patient/family/caregiver in decisions related to nutrition  8/14/2020 0004 by Bismark Arita RN  Outcome: Progressing  8/14/2020 0004 by Bismark Arita RN  Outcome: Progressing     Problem: GASTROINTESTINAL - ADULT  Goal: Maintains or returns to baseline bowel function  Description: INTERVENTIONS:  - Assess bowel function  - Encourage oral fluids to ensure adequate hydration  - Administer IV fluids if ordered to ensure adequate hydration  - Administer ordered medications as needed  - Encourage mobilization and activity  - Consider nutritional services referral to assist patient with adequate nutrition and appropriate food choices  8/14/2020 0004 by Bismark Arita RN  Outcome: Not Progressing  8/14/2020 0004 by Bismark Arita RN  Outcome: Progressing  Goal: Maintains adequate nutritional intake  Description: INTERVENTIONS:  - Monitor percentage of each meal consumed  - Identify factors contributing to decreased intake, treat as appropriate  - Assist with meals as needed  - Monitor I&O, weight, and lab values if indicated  - Obtain nutrition services referral as needed  8/14/2020 0004 by Bismark Arita RN  Outcome: Not Progressing  8/14/2020 0004 by Bismark Arita RN  Outcome: Progressing

## 2020-08-14 NOTE — TELEMEDICINE
INTERPROFESSIONAL (PHONE) [address] [place] 77 y o  female MRN: [id number]  Unit/Bed#: [facility] 507-01 Encounter: [id number]    IR has been consulted to evaluate the patient, determine the appropriate procedure, and whether or not a procedure can and should be performed regarding the care of Marciano [place]  We were consulted by surgery concerning line access, and to possibly perform a tunneled PICC vs IJ central line if medically appropriate for the patient  Inpatient Consult to IR  Consult performed by: Griffin Severin, PA-C  Consult ordered by: Alexandre Alexander MD        08/14/20      Assessment/Recommendation:     77year old female presenting with incarcerated hernia s/p ex lap with mesh placement, CKD 5, now with need for line access for blood draws and potential TPN    - will plan for tunneled picc vs IJ central line    Total time spent in review of data, discussion with requesting provider and rendering advice was 15 minutes       Patient or appropriate family member was verbally informed by surgery of this consultative service on their behalf to provide more timely access to specialty care in lieu of an in person consultation  They were informed it is a billable service unless the it was determined an in person follow up was medically necessary by me within the next 14 days at which time only the in person consult would be billed  Verbal consent was obtained  Thank you for allowing Interventional Radiology to participate in the care of Marciano [place]  Please don't hesitate to call or TigerText us with any questions       Griffin Severin, PA-C

## 2020-08-14 NOTE — TELEPHONE ENCOUNTER
Denver Childs is a 52-year-old female seen in consultation at [facility] for urostomy with peristomal hernia  This was repaired during hospitalization  Please contact patient with hospital follow-up appointment date and time should be seen by Danny Bolanos in approximately 2--3 weeks  Thank you

## 2020-08-14 NOTE — PROGRESS NOTES
NEPHROLOGY PROGRESS NOTE   Bernarda Moody 77 y o  female MRN: [id number]  Unit/Bed#: [facility] 507-01 Encounter: [id number]  Reason for Consult:  Acute kidney injury    Assessment/Plan:  1  Acute kidney injury, most likely secondary to hemodynamic fluctuations as well as obstructive uropathy given hydronephrosis  2  Chronic kidney disease previous baseline creatinine in the mid 2s to low 3's, follows with Dr Levy  3  Metabolic acidosis secondary to ongoing renal failure, overall has resolved  4  Bilateral hydronephrosis to the level of the ileal conduit, history cystectomy with ileal conduit, anticipated loopogram  5  Postoperative incarcerated ileal conduit parastomal hernia status post ex lap and parastomal hernia repair  6  Postoperative ileus currently with NG tube placement  7  Postoperative anemia, continue monitor closely      PLAN:  · Renal function overall stable creatinine at 3 6  · Continue with IV fluids given NPO status and NG tube output  · Volume status and blood pressure appears stable    SUBJECTIVE:  Seen examined  Patient feeling improved today less abdominal discomfort  Still with NG tube in place  No reports of chest pain shortness of breath or swelling  Review of Systems    OBJECTIVE:  Current Weight: Weight - Scale: 89 2 kg (196 lb 10 4 oz)  Vitals:    08/14/20 0207 08/14/20 0345 08/14/20 0701 08/14/20 1100   BP: 168/79  170/82 165/87   BP Location:   Left arm Left arm   Pulse:   95 96   Resp:   18 18   Temp:   97 9 °F (36 6 °C) 98 2 °F (36 8 °C)   TempSrc:   Oral Oral   SpO2:  93% 97% 98%   Weight:       Height:           Intake/Output Summary (Last 24 hours) at 8/14/2020 1428  Last data filed at 8/14/2020 1201  Gross per 24 hour   Intake 1867 94 ml   Output 2465 ml   Net -597 06 ml       Physical Exam  Constitutional:       General: She is not in acute distress  Eyes:      General: No scleral icterus  Cardiovascular:      Rate and Rhythm: Normal rate and regular rhythm  Pulmonary:      Effort: Pulmonary effort is normal       Breath sounds: Normal breath sounds  Abdominal:      General: There is distension  Palpations: Abdomen is soft  Tenderness: There is abdominal tenderness  Musculoskeletal:      Right lower leg: No edema  Left lower leg: No edema  Skin:     General: Skin is warm and dry  Findings: No rash  Neurological:      Mental Status: She is alert and oriented to person, place, and time           Medications:    Current Facility-Administered Medications:     benzocaine (HURRICAINE) 20 % mucosal spray 2 spray, 2 spray, Mucosal, Once, Cinthya Ma MD, Stopped at 08/12/20 1544    heparin (porcine) 25,000 units in 250 mL infusion (premix), 3-30 Units/kg/hr (Order-Specific), Intravenous, Titrated, Rosamaria Still MD, Last Rate: 11 9 mL/hr at 08/13/20 2222, 14 Units/kg/hr at 08/13/20 2222    HYDROmorphone (DILAUDID) injection 0 2 mg, 0 2 mg, Intravenous, Q3H PRN, Whitfield Duane, MD, 0 2 mg at 08/12/20 0330    HYDROmorphone (DILAUDID) injection 0 5 mg, 0 5 mg, Intravenous, Q3H PRN, Whitfield Duane, MD, 0 5 mg at 08/12/20 0708    HYDROmorphone (DILAUDID) injection 0 5 mg, 0 5 mg, Intravenous, Q3H PRN, Whitfield Duane, MD, 0 5 mg at 08/11/20 5963    metoprolol (LOPRESSOR) injection 5 mg, 5 mg, Intravenous, Q8H, Whitfield Duane, MD, 5 mg at 08/14/20 0902    multi-electrolyte (PLASMALYTE-A/ISOLYTE-S PH 7 4) IV solution, 125 mL/hr, Intravenous, Continuous, Dylan Cuevas DO, Last Rate: 125 mL/hr at 08/14/20 0901, 125 mL/hr at 08/14/20 0901    ondansetron (ZOFRAN) injection 4 mg, 4 mg, Intravenous, Q4H PRN, Whitfield Duane, MD, 4 mg at 08/12/20 1959    prothrombin complex conc human (KCENTRA) 3,000 Units, 35 Units/kg, Intravenous, Once, Whitfield Duane, MD    Laboratory Results:  Results from last 7 days   Lab Units 08/14/20  0448 08/13/20  0542 08/13/20  0541 08/12/20  0504 08/11/20  2322 08/11/20  0436 08/10/20  0607 08/09/20  1716   WBC Thousand/uL 5  38 6 21  --  5 08 5 42 4 93 5 61 11 21*   HEMOGLOBIN g/dL 8 3* 9 9*  --  9 4* 10 2* 9 2* 10 4* 12 3   HEMATOCRIT % 27 0* 31 9*  --  29 0* 31 7* 28 5* 31 7* 36 3   PLATELETS Thousands/uL 210 263  --  179 198 184 238 337   POTASSIUM mmol/L 4 0  --  3 8 4 3  --  3 7 4 2 4 7   CHLORIDE mmol/L 103  --  100 103  --  103 110* 104   CO2 mmol/L 33*  --  31 27  --  27 16* 20*   BUN mg/dL 46*  --  45* 45*  --  48* 58* 58*   CREATININE mg/dL 3 64*  --  3 62* 3 72*  --  4 14* 4 88* 5 17*   CALCIUM mg/dL 8 5  --  8 5 8 0*  --  7 4* 7 3* 9 2   MAGNESIUM mg/dL 2 4  --   --   --   --   --  1 6  --    PHOSPHORUS mg/dL 4 0  --   --   --   --   --  4 4*  --

## 2020-08-14 NOTE — PROGRESS NOTES
UROLOGY PROGRESS NOTE   Patient Identifiers: Miguelito Lynne (MRN [id number])  Date of Service: 8/14/2020    Subjective:     Awake and alert  Sitting out of bed in chair  Afebrile  WBC 5 38   H&H 8 3 and 27 0  Creatinine 3 64  NG tube still in place  Patient has  no complaints        Objective:     VITALS:    Vitals:    08/14/20 1100   BP: 165/87   Pulse: 96   Resp: 18   Temp: 98 2 °F (36 8 °C)   SpO2: 98%           LABS:  Lab Results   Component Value Date    HGB 8 3 (L) 08/14/2020    HCT 27 0 (L) 08/14/2020    WBC 5 38 08/14/2020     08/14/2020   ]    Lab Results   Component Value Date     12/17/2015    K 4 0 08/14/2020     08/14/2020    CO2 33 (H) 08/14/2020    BUN 46 (H) 08/14/2020    CREATININE 3 64 (H) 08/14/2020    CALCIUM 8 5 08/14/2020    GLUCOSE 138 10/04/2016   ]        INPATIENT MEDS:    Current Facility-Administered Medications:     benzocaine (HURRICAINE) 20 % mucosal spray 2 spray, 2 spray, Mucosal, Once, Alexander Saba MD, Stopped at 08/12/20 1544    heparin (porcine) 25,000 units in 250 mL infusion (premix), 3-30 Units/kg/hr (Order-Specific), Intravenous, Titrated, Oleg Vasquez MD, Last Rate: 11 9 mL/hr at 08/13/20 2222, 14 Units/kg/hr at 08/13/20 2222    HYDROmorphone (DILAUDID) injection 0 2 mg, 0 2 mg, Intravenous, Q3H PRN, Jessica Vasquez MD, 0 2 mg at 08/12/20 0330    HYDROmorphone (DILAUDID) injection 0 5 mg, 0 5 mg, Intravenous, Q3H PRN, Jessica Vaqsuez MD, 0 5 mg at 08/12/20 0708    HYDROmorphone (DILAUDID) injection 0 5 mg, 0 5 mg, Intravenous, Q3H PRN, Jessica Vasquez MD, 0 5 mg at 08/11/20 8538    metoprolol (LOPRESSOR) injection 5 mg, 5 mg, Intravenous, Q8H, Jessica Vasquez MD, 5 mg at 08/14/20 0902    multi-electrolyte (PLASMALYTE-A/ISOLYTE-S PH 7 4) IV solution, 125 mL/hr, Intravenous, Continuous, Carrie Lomeli DO, Last Rate: 125 mL/hr at 08/14/20 0901, 125 mL/hr at 08/14/20 0901    ondansetron (ZOFRAN) injection 4 mg, 4 mg, Intravenous, Q4H PRN, Noy Wooten MD, 4 mg at 08/12/20 1959    prothrombin complex conc human (KCENTRA) 3,000 Units, 35 Units/kg, Intravenous, Once, Noy Wooten MD      Physical Exam:   /87 (BP Location: Left arm)   Pulse 96   Temp 98 2 °F (36 8 °C) (Oral)   Resp 18   Ht 5' (1 524 m)   Wt 89 2 kg (196 lb 10 4 oz)   SpO2 98%   BMI 38 41 kg/m²   GEN: no acute distress    RESP: breathing comfortably with no accessory muscle use    ABD: soft, appropriately tender to palpation, non-distended and mildly distended   INCISION: clean, dry, intact  Stoma is pink and viable  EXT: no significant peripheral edema       ILEAL CONDUIT: in place draining clear yellow urine  no clots and       RADIOLOGY:    none     Assessment:    1  Small-bowel obstruction   2  Incarcerated peristomal hernia at ileal conduit   3  POD#4   Exploratory laparotomy, reseating of ileal conduit with reduction of peristomal hernia   4  Acute kidney injury on chronic kidney disease baseline mid 2s to low 3's     5  Postoperative ileus    Plan:   - ostomy is pink and functional  -  Continue surgical management  -  -

## 2020-08-14 NOTE — PROGRESS NOTES
Informed Kalen Munguia about the decreased urine output that the patient had put out throughout the day  Over the course of the day she has put out 300 mL from the ileal conduit   She has similar output to yesterday, but information given to appropriate team

## 2020-08-14 NOTE — RESTORATIVE TECHNICIAN NOTE
Restorative Specialist Mobility Note       Activity: Ambulate in [place]     Assistive Device: Front wheel walker        Repositioned:  Other (Comment)(Bed Alarm)

## 2020-08-14 NOTE — PROGRESS NOTES
Progress Note - Malick Barba 77 y o  female MRN: [id number]    Unit/Bed#: [facility] 507-01 Encounter: [id number]      Assessment:  77 F with incarcerated parastomal hernia s/p ex-lap, reduction and parastomal hernia repair with Phasix mesh, LUANN secondary to obstructive uropathy        Plan:  Continue heparin gtt  Continue NG tube/NPO for ileus  Continue elli drain  Continue ivf per nephro, appreciate recs  Ambulate  Low urine output overnight, increase IV fluids replace losses from NG tube  PICC today, consider TPN    Subjective:   Denies pain  Denies nausea, vomiting, fever, chills  Small BM this morning per nurse  Low urine output overnight  Objective:     Vitals: Blood pressure 170/82, pulse 95, temperature 97 9 °F (36 6 °C), temperature source Oral, resp  rate 18, height 5' (1 524 m), weight 89 2 kg (196 lb 10 4 oz), SpO2 97 %, not currently breastfeeding  ,Body mass index is 38 41 kg/m²  Intake/Output Summary (Last 24 hours) at 8/14/2020 0827  Last data filed at 8/14/2020 0601  Gross per 24 hour   Intake 2085 7 ml   Output 2365 ml   Net -279 3 ml       Physical Exam  General: NAD  HEENT: NC/AT  MMM  Cv: RRR  Lungs: normal effort  Ab: Soft, mild tender near incision  Incision c/d/i  ELLI in place ss output  Ostomy right abdomen pink and healthy  Ex: no CCE  Neuro: alert, answers questions appropriately       Scheduled Meds:  Current Facility-Administered Medications   Medication Dose Route Frequency Provider Last Rate    benzocaine  2 spray Mucosal Once Rod Zimmer MD      heparin (porcine)  3-30 Units/kg/hr (Order-Specific) Intravenous Titrated Sunny Orona MD 14 Units/kg/hr (08/13/20 2222)    HYDROmorphone  0 2 mg Intravenous Q3H PRN Elo Brown MD      HYDROmorphone  0 5 mg Intravenous Q3H PRN Elo Brown MD      HYDROmorphone  0 5 mg Intravenous Q3H PRN Elo Brown MD      metoprolol  5 mg Intravenous Michelle Gotti MD      multi-electrolyte  125 mL/hr Intravenous Continuous Topher Pinzon,  mL/hr (08/14/20 0601)    ondansetron  4 mg Intravenous Q4H PRN Donna Marlow MD      Kcentra (PROTHROMBIN)  35 Units/kg Intravenous Once Donna Marlow MD       Continuous Infusions:heparin (porcine), 3-30 Units/kg/hr (Order-Specific), Last Rate: 14 Units/kg/hr (08/13/20 2222)  multi-electrolyte, 125 mL/hr, Last Rate: 125 mL/hr (08/14/20 0601)      PRN Meds:  HYDROmorphone    HYDROmorphone    HYDROmorphone    ondansetron      Invasive Devices     Peripheral Intravenous Line            Peripheral IV 08/11/20 Right Hand 2 days    Peripheral IV 08/13/20 Distal;Right;Upper;Ventral (anterior) Arm less than 1 day    Peripheral IV 08/13/20 Left Antecubital less than 1 day          Drain            Urostomy Ileal conduit RUQ 1409 days    Closed/Suction Drain Left LLQ Bulb 19 Fr  4 days    NG/OG/Enteral Tube Nasogastric 16 Fr Right nares less than 1 day                Lab, Imaging and other studies: I have personally reviewed pertinent reports      VTE Pharmacologic Prophylaxis: Heparin  VTE Mechanical Prophylaxis: sequential compression device

## 2020-08-15 LAB
ANION GAP SERPL CALCULATED.3IONS-SCNC: 9 MMOL/L (ref 4–13)
APTT PPP: 79 SECONDS (ref 23–37)
BUN SERPL-MCNC: 43 MG/DL (ref 5–25)
CALCIUM SERPL-MCNC: 7.9 MG/DL (ref 8.3–10.1)
CHLORIDE SERPL-SCNC: 106 MMOL/L (ref 100–108)
CO2 SERPL-SCNC: 32 MMOL/L (ref 21–32)
CREAT SERPL-MCNC: 3.16 MG/DL (ref 0.6–1.3)
ERYTHROCYTE [DISTWIDTH] IN BLOOD BY AUTOMATED COUNT: 14.2 % (ref 11.6–15.1)
GFR SERPL CREATININE-BSD FRML MDRD: 14 ML/MIN/1.73SQ M
GLUCOSE SERPL-MCNC: 100 MG/DL (ref 65–140)
GLUCOSE SERPL-MCNC: 102 MG/DL (ref 65–140)
GLUCOSE SERPL-MCNC: 103 MG/DL (ref 65–140)
GLUCOSE SERPL-MCNC: 71 MG/DL (ref 65–140)
GLUCOSE SERPL-MCNC: 95 MG/DL (ref 65–140)
HCT VFR BLD AUTO: 25.9 % (ref 34.8–46.1)
HGB BLD-MCNC: 7.8 G/DL (ref 11.5–15.4)
MCH RBC QN AUTO: 30.2 PG (ref 26.8–34.3)
MCHC RBC AUTO-ENTMCNC: 30.1 G/DL (ref 31.4–37.4)
MCV RBC AUTO: 100 FL (ref 82–98)
PLATELET # BLD AUTO: 200 THOUSANDS/UL (ref 149–390)
PMV BLD AUTO: 10.4 FL (ref 8.9–12.7)
POTASSIUM SERPL-SCNC: 3.4 MMOL/L (ref 3.5–5.3)
RBC # BLD AUTO: 2.58 MILLION/UL (ref 3.81–5.12)
SODIUM SERPL-SCNC: 147 MMOL/L (ref 136–145)
WBC # BLD AUTO: 4.7 THOUSAND/UL (ref 4.31–10.16)

## 2020-08-15 PROCEDURE — 97116 GAIT TRAINING THERAPY: CPT

## 2020-08-15 PROCEDURE — 99024 POSTOP FOLLOW-UP VISIT: CPT | Performed by: SURGERY

## 2020-08-15 PROCEDURE — 85730 THROMBOPLASTIN TIME PARTIAL: CPT | Performed by: INTERNAL MEDICINE

## 2020-08-15 PROCEDURE — 80048 BASIC METABOLIC PNL TOTAL CA: CPT | Performed by: SURGERY

## 2020-08-15 PROCEDURE — 82948 REAGENT STRIP/BLOOD GLUCOSE: CPT

## 2020-08-15 PROCEDURE — 99232 SBSQ HOSP IP/OBS MODERATE 35: CPT | Performed by: INTERNAL MEDICINE

## 2020-08-15 PROCEDURE — 85027 COMPLETE CBC AUTOMATED: CPT | Performed by: SURGERY

## 2020-08-15 RX ORDER — POTASSIUM CHLORIDE 14.9 MG/ML
20 INJECTION INTRAVENOUS
Status: COMPLETED | OUTPATIENT
Start: 2020-08-15 | End: 2020-08-15

## 2020-08-15 RX ADMIN — POTASSIUM CHLORIDE 20 MEQ: 14.9 INJECTION, SOLUTION INTRAVENOUS at 10:56

## 2020-08-15 RX ADMIN — METOROPROLOL TARTRATE 5 MG: 5 INJECTION, SOLUTION INTRAVENOUS at 09:18

## 2020-08-15 RX ADMIN — SODIUM CHLORIDE, SODIUM GLUCONATE, SODIUM ACETATE, POTASSIUM CHLORIDE AND MAGNESIUM CHLORIDE 125 ML/HR: 526; 502; 368; 37; 30 INJECTION, SOLUTION INTRAVENOUS at 04:21

## 2020-08-15 RX ADMIN — SODIUM CHLORIDE, SODIUM GLUCONATE, SODIUM ACETATE, POTASSIUM CHLORIDE AND MAGNESIUM CHLORIDE 75 ML/HR: 526; 502; 368; 37; 30 INJECTION, SOLUTION INTRAVENOUS at 14:47

## 2020-08-15 RX ADMIN — METOROPROLOL TARTRATE 5 MG: 5 INJECTION, SOLUTION INTRAVENOUS at 15:43

## 2020-08-15 RX ADMIN — POTASSIUM CHLORIDE 20 MEQ: 14.9 INJECTION, SOLUTION INTRAVENOUS at 09:14

## 2020-08-15 RX ADMIN — HEPARIN SODIUM AND DEXTROSE 14 UNITS/KG/HR: 10000; 5 INJECTION INTRAVENOUS at 14:46

## 2020-08-15 NOTE — RESTORATIVE TECHNICIAN NOTE
Restorative Specialist Mobility Note       Activity: Ambulate in room, Chair, Dangle, Stand at bedside(Educated/encouraged pt to ambulate with assistance 3-4 x's/day  Chair alarm on   Pt callbell, phone/tray within reach )     Assistive Device: Front wheel walker(Assist x1-2, NG tube hooked up to wall suction )         Susan BAZAN, Restorative Technician, [employer]

## 2020-08-15 NOTE — PHYSICAL THERAPY NOTE
PHYSICAL THERAPY TREATMENT NOTE          Patient Name: Carroll Padilla  HGIIH'T Date: 8/15/2020     08/15/20 1426   Restrictions/Precautions   Weight Bearing Precautions Per Order No   Other Precautions Contact/isolation;Multiple lines; Fall Risk;Telemetry;Pain   General   Chart Reviewed Yes   Response to Previous Treatment Patient with no complaints from previous session  Family/Caregiver Present No   Cognition   Overall Cognitive Status WFL   Arousal/Participation Alert   Attention Attends with cues to redirect   Orientation Level Oriented to person;Oriented to place;Oriented to time   Memory Decreased recall of precautions   Following Commands Follows one step commands with increased time or repetition   Comments Pt with improved motivation to participate  Pt with decreased safety awareness and insight into deficits, requires cues for safety throughout session  Subjective   Subjective Pt pleasant and agreeable to participate in therapy  Bed Mobility   Additional Comments OOB in chair upon PT arrival   Pt left upright in bedside chair with chair alarm intact and all needs in reach at end of therapy session  Transfers   Sit to Stand 3  Moderate assistance   Additional items Assist x 1; Increased time required;Verbal cues;Armrests   Stand to Sit 3  Moderate assistance   Additional items Assist x 1; Increased time required;Verbal cues;Armrests   Additional Comments Transfers with    VC for hand placement and safety  Ambulation/Elevation   Gait pattern Excessively slow; Short stride; Foward flexed;Decreased foot clearance; Improper Weight shift;Narrow MITRA;Scissoring   Gait Assistance 4  Minimal assist  (CGA)   Additional items Assist x 1  (assist of second for chair follow)   Assistive Device Rolling walker   Distance 40 ft x 1   Balance   Static Sitting Fair +   Dynamic Sitting Fair   Static Standing Fair   Dynamic Standing Fair -   Ambulatory Fair -   Endurance Deficit   Endurance Deficit Yes   Endurance Deficit Description fatigue, weakness   Activity Tolerance   Activity Tolerance Patient limited by fatigue   Medical Staff Made Aware SPT   Nurse Made Aware RN cleared pt to be seen by PT   Assessment   Prognosis Good   Problem List Decreased strength;Decreased endurance; Impaired balance;Decreased mobility; Decreased safety awareness;Pain   Assessment Pt seen for PT treatment session with focus on functional transfers and functional mobility  Pt demonstrating good progress toward goals this session  Pt with improved pain control and nausea this session, allowing pt to participate in further distance ambulation  Additionally, pt presents with improved strength and balance evident by need for decreased assist for functional mobility  Despite improvements, pt continues to be limited by strength deficits as well as balance and endurance impairments  Pt left upright in bedside chair with chair alarm intact and with all needs in reach  Pt will benefit from skilled therapy in order to address current impairments and functional limitations  PT to follow pt and recommending rehab once medically cleared  Barriers to Discharge Inaccessible home environment;Decreased caregiver support   Goals   Patient Goals to get better   STG Expiration Date 08/26/20   Plan   Treatment/Interventions Functional transfer training;LE strengthening/ROM; Elevations; Therapeutic exercise; Endurance training;Patient/family training;Equipment eval/education; Bed mobility;Gait training;Spoke to nursing   Progress Progressing toward goals   PT Frequency Other (Comment)  (3-5x/wk)   Recommendation   PT Discharge Recommendation Post-Acute Rehabilitation Services   Equipment Recommended Walker   PT - OK to Discharge Yes  (to rehab once medically cleared)     Indigo Welch, PT, DPT

## 2020-08-15 NOTE — PLAN OF CARE
Problem: PHYSICAL THERAPY ADULT  Goal: Performs mobility at highest level of function for planned discharge setting  See evaluation for individualized goals  Description: Treatment/Interventions: Functional transfer training, LE strengthening/ROM, Elevations, Therapeutic exercise, Endurance training, Patient/family training, Equipment eval/education, Bed mobility, Gait training, Spoke to nursing  Equipment Recommended: Perla Brown       See flowsheet documentation for full assessment, interventions and recommendations  Outcome: Progressing  Note: Prognosis: Good  Problem List: Decreased strength, Decreased endurance, Impaired balance, Decreased mobility, Decreased safety awareness, Pain  Assessment: Pt seen for PT treatment session with focus on functional transfers and functional mobility  Pt demonstrating good progress toward goals this session  Pt with improved pain control and nausea this session, allowing pt to participate in further distance ambulation  Additionally, pt presents with improved strength and balance evident by need for decreased assist for functional mobility  Despite improvements, pt continues to be limited by strength deficits as well as balance and endurance impairments  Pt left upright in bedside chair with chair alarm intact and with all needs in reach  Pt will benefit from skilled therapy in order to address current impairments and functional limitations  PT to follow pt and recommending rehab once medically cleared  Barriers to Discharge: Inaccessible home environment, Decreased caregiver support     PT Discharge Recommendation: [id number]     PT - OK to Discharge: Yes(to rehab once medically cleared)    See flowsheet documentation for full assessment

## 2020-08-15 NOTE — PROGRESS NOTES
Patient had NG tube removed this morning  Prior to removal, assessed patient's GI system  Pt denied nausea and stomach tenderness  Patient had small, loose BM this morning  Later in day, patient was started on a clear liquid diet  Patient tolerated diet well  She denied any type of N/V  Reached out to Timothy Edge with [facility] at [phone] to inform him of patient stating she feels full  Patient does not feel nauseous  Pt denies stomach tenderness  Patient has positive bowel sounds and another small BM this afternoon

## 2020-08-15 NOTE — PROGRESS NOTES
NEPHROLOGY PROGRESS NOTE   Carole Callahan 77 y o  female MRN: [id number]  Unit/Bed#: [facility] 507-01 Encounter: [id number]  Reason for Consult:  Acute kidney injury    Assessment/Plan:  1  Acute kidney injury, most likely secondary to hemodynamic fluctuations as well as obstructive uropathy given hydronephrosis  2  Chronic kidney disease previous baseline creatinine in the mid 2s to low 3's, follows with Dr Levy  3  Metabolic acidosis secondary to ongoing renal failure, overall has resolved  4  Bilateral hydronephrosis to the level of the ileal conduit, history cystectomy with ileal conduit, negative loopogram  5  Postoperative incarcerated ileal conduit parastomal hernia status post ex lap and parastomal hernia repair  6  Postoperative ileus currently with NG tube placement  7  Postoperative anemia, continue monitor closely      PLAN:  · Renal function overall has continued to improve, now within previous baseline  · Continue with IV fluids given poor oral intake  · Blood pressure volume status appears stable    SUBJECTIVE:  NG tube removed  Still with significant abdominal discomfort  Poor oral intake currently  Remains on IV fluids  No reports of chest pain or shortness of breath  Review of Systems    OBJECTIVE:  Current Weight: Weight - Scale: 89 2 kg (196 lb 10 4 oz)  Vitals:    08/15/20 0000 08/15/20 0259 08/15/20 0400 08/15/20 0800   BP:  149/69  (!) 172/90   BP Location:  Left arm  Left arm   Pulse:  79  69   Resp:  18  18   Temp:  97 9 °F (36 6 °C)     TempSrc:  Oral     SpO2: 93% 99% 100% 100%   Weight:       Height:           Intake/Output Summary (Last 24 hours) at 8/15/2020 1143  Last data filed at 8/15/2020 0556  Gross per 24 hour   Intake 2048  73 ml   Output 970 ml   Net 1078 73 ml       Physical Exam  HENT:      Head: Normocephalic and atraumatic  Eyes:      General: No scleral icterus  Cardiovascular:      Rate and Rhythm: Normal rate and regular rhythm     Pulmonary:      Effort: Pulmonary effort is normal       Breath sounds: Normal breath sounds  Abdominal:      General: There is distension  Tenderness: There is abdominal tenderness  Musculoskeletal:      Right lower leg: No edema  Left lower leg: No edema  Skin:     General: Skin is warm and dry  Findings: No rash  Neurological:      Mental Status: She is alert and oriented to person, place, and time           Medications:    Current Facility-Administered Medications:     benzocaine (HURRICAINE) 20 % mucosal spray 2 spray, 2 spray, Mucosal, Once, Aurora Scherer MD, Stopped at 08/12/20 1544    heparin (porcine) 25,000 units in 250 mL infusion (premix), 3-30 Units/kg/hr (Order-Specific), Intravenous, Titrated, Ezio Kelly MD, Last Rate: 11 9 mL/hr at 08/14/20 1525, 14 Units/kg/hr at 08/14/20 1525    HYDROmorphone (DILAUDID) injection 0 2 mg, 0 2 mg, Intravenous, Q3H PRN, Raphael Maher MD, 0 2 mg at 08/12/20 0330    HYDROmorphone (DILAUDID) injection 0 5 mg, 0 5 mg, Intravenous, Q3H PRN, Raphael Maher MD, 0 5 mg at 08/12/20 0708    HYDROmorphone (DILAUDID) injection 0 5 mg, 0 5 mg, Intravenous, Q3H PRN, Raphael Maher MD, 0 5 mg at 08/11/20 7118    metoprolol (LOPRESSOR) injection 5 mg, 5 mg, Intravenous, Q8H, Raphael Maher MD, 5 mg at 08/15/20 0918    multi-electrolyte (PLASMALYTE-A/ISOLYTE-S PH 7 4) IV solution, 75 mL/hr, Intravenous, Continuous, Gomez Gray MD, Last Rate: 75 mL/hr at 08/15/20 1009, 75 mL/hr at 08/15/20 1009    ondansetron (ZOFRAN) injection 4 mg, 4 mg, Intravenous, Q4H PRN, Raphael Maher MD, 4 mg at 08/12/20 1959    potassium chloride 20 mEq IVPB (premix), 20 mEq, Intravenous, Q2H, Gomez Gray MD, Last Rate: 50 mL/hr at 08/15/20 1056, 20 mEq at 08/15/20 1056    prothrombin complex conc human (KCENTRA) 3,000 Units, 35 Units/kg, Intravenous, Once, Raphael Maher MD    Laboratory Results:  Results from last 7 days   Lab Units 08/15/20  0505 08/14/20  0894 08/13/20  0542 08/13/20  0541 08/12/20  0504 08/11/20  2322 08/11/20  0436 08/10/20  0607 08/09/20  1716   WBC Thousand/uL 4 70 5 38 6 21  --  5 08 5 42 4 93 5 61 11 21*   HEMOGLOBIN g/dL 7 8* 8 3* 9 9*  --  9 4* 10 2* 9 2* 10 4* 12 3   HEMATOCRIT % 25 9* 27 0* 31 9*  --  29 0* 31 7* 28 5* 31 7* 36 3   PLATELETS Thousands/uL 200 210 263  --  179 198 184 238 337   POTASSIUM mmol/L 3 4* 4 0  --  3 8 4 3  --  3 7 4 2 4 7   CHLORIDE mmol/L 106 103  --  100 103  --  103 110* 104   CO2 mmol/L 32 33*  --  31 27  --  27 16* 20*   BUN mg/dL 43* 46*  --  45* 45*  --  48* 58* 58*   CREATININE mg/dL 3 16* 3 64*  --  3 62* 3 72*  --  4 14* 4 88* 5 17*   CALCIUM mg/dL 7 9* 8 5  --  8 5 8 0*  --  7 4* 7 3* 9 2   MAGNESIUM mg/dL  --  2 4  --   --   --   --   --  1 6  --    PHOSPHORUS mg/dL  --  4 0  --   --   --   --   --  4 4*  --

## 2020-08-15 NOTE — PLAN OF CARE
Problem: Potential for Falls  Goal: Patient will remain free of falls  Description: INTERVENTIONS:  - Assess patient frequently for physical needs  -  Identify cognitive and physical deficits and behaviors that affect risk of falls    -  Victor fall precautions as indicated by assessment   - Educate patient/family on patient safety including physical limitations  - Instruct patient to call for assistance with activity based on assessment  - Modify environment to reduce risk of injury  - Consider OT/PT consult to assist with strengthening/mobility  Outcome: Progressing     Problem: Prexisting or High Potential for Compromised Skin Integrity  Goal: Skin integrity is maintained or improved  Description: INTERVENTIONS:  - Identify patients at risk for skin breakdown  - Assess and monitor skin integrity  - Assess and monitor nutrition and hydration status  - Monitor labs   - Assess for incontinence   - Turn and reposition patient  - Assist with mobility/ambulation  - Relieve pressure over bony prominences  - Avoid friction and shearing  - Provide appropriate hygiene as needed including keeping skin clean and dry  - Evaluate need for skin moisturizer/barrier cream  - Collaborate with interdisciplinary team   - Patient/family teaching  - Consider wound care consult   Outcome: Progressing     Problem: CARDIOVASCULAR - ADULT  Goal: Maintains optimal cardiac output and hemodynamic stability  Description: INTERVENTIONS:  - Monitor I/O, vital signs and rhythm  - Monitor for S/S and trends of decreased cardiac output  - Administer and titrate ordered vasoactive medications to optimize hemodynamic stability  - Assess quality of pulses, skin color and temperature  - Assess for signs of decreased coronary artery perfusion  - Instruct patient to report change in severity of symptoms  Outcome: Progressing  Goal: Absence of cardiac dysrhythmias or at baseline rhythm  Description: INTERVENTIONS:  - Continuous cardiac monitoring, vital signs, obtain 12 lead EKG if ordered  - Administer antiarrhythmic and heart rate control medications as ordered  - Monitor electrolytes and administer replacement therapy as ordered  Outcome: Progressing     Problem: RESPIRATORY - ADULT  Goal: Achieves optimal ventilation and oxygenation  Description: INTERVENTIONS:  - Assess for changes in respiratory status  - Assess for changes in mentation and behavior  - Position to facilitate oxygenation and minimize respiratory effort  - Oxygen administered by appropriate delivery if ordered  - Initiate smoking cessation education as indicated  - Encourage broncho-pulmonary hygiene including cough, deep breathe, Incentive Spirometry  - Assess the need for suctioning and aspirate as needed  - Assess and instruct to report SOB or any respiratory difficulty  - Respiratory Therapy support as indicated  Outcome: Progressing     Problem: GASTROINTESTINAL - ADULT  Goal: Minimal or absence of nausea and/or vomiting  Description: INTERVENTIONS:  - Administer IV fluids if ordered to ensure adequate hydration  - Maintain NPO status until nausea and vomiting are resolved  - Nasogastric tube if ordered  - Administer ordered antiemetic medications as needed  - Provide nonpharmacologic comfort measures as appropriate  - Advance diet as tolerated, if ordered  - Consider nutrition services referral to assist patient with adequate nutrition and appropriate food choices  Outcome: Progressing  Goal: Maintains or returns to baseline bowel function  Description: INTERVENTIONS:  - Assess bowel function  - Encourage oral fluids to ensure adequate hydration  - Administer IV fluids if ordered to ensure adequate hydration  - Administer ordered medications as needed  - Encourage mobilization and activity  - Consider nutritional services referral to assist patient with adequate nutrition and appropriate food choices  Outcome: Progressing  Goal: Maintains adequate nutritional intake  Description: INTERVENTIONS:  - Monitor percentage of each meal consumed  - Identify factors contributing to decreased intake, treat as appropriate  - Assist with meals as needed  - Monitor I&O, weight, and lab values if indicated  - Obtain nutrition services referral as needed  Outcome: Progressing  Goal: Establish and maintain optimal ostomy function  Description: INTERVENTIONS:  - Assess bowel function  - Encourage oral fluids to ensure adequate hydration  - Administer IV fluids if ordered to ensure adequate hydration   - Administer ordered medications as needed  - Encourage mobilization and activity  - Nutrition services referral to assist patient with appropriate food choices  - Assess stoma site  - Consider wound care consult   Outcome: Progressing     Problem: METABOLIC, FLUID AND ELECTROLYTES - ADULT  Goal: Electrolytes maintained within normal limits  Description: INTERVENTIONS:  - Monitor labs and assess patient for signs and symptoms of electrolyte imbalances  - Administer electrolyte replacement as ordered  - Monitor response to electrolyte replacements, including repeat lab results as appropriate  - Instruct patient on fluid and nutrition as appropriate  Outcome: Progressing  Goal: Fluid balance maintained  Description: INTERVENTIONS:  - Monitor labs   - Monitor I/O and WT  - Instruct patient on fluid and nutrition as appropriate  - Assess for signs & symptoms of volume excess or deficit  Outcome: Progressing  Goal: Glucose maintained within target range  Description: INTERVENTIONS:  - Monitor Blood Glucose as ordered  - Assess for signs and symptoms of hyperglycemia and hypoglycemia  - Administer ordered medications to maintain glucose within target range  - Assess nutritional intake and initiate nutrition service referral as needed  Outcome: Progressing     Problem: Nutrition/Hydration-ADULT  Goal: Nutrient/Hydration intake appropriate for improving, restoring or maintaining nutritional needs  Description: Monitor and assess patient's nutrition/hydration status for malnutrition  Collaborate with interdisciplinary team and initiate plan and interventions as ordered  Monitor patient's weight and dietary intake as ordered or per policy  Utilize nutrition screening tool and intervene as necessary  Determine patient's food preferences and provide high-protein, high-caloric foods as appropriate       INTERVENTIONS:  - Monitor oral intake, urinary output, labs, and treatment plans  - Assess nutrition and hydration status and recommend course of action  - Evaluate amount of meals eaten  - Assist patient with eating if necessary   - Allow adequate time for meals  - Recommend/ encourage appropriate diets, oral nutritional supplements, and vitamin/mineral supplements  - Order, calculate, and assess calorie counts as needed  - Recommend, monitor, and adjust tube feedings and TPN/PPN based on assessed needs  - Assess need for intravenous fluids  - Provide specific nutrition/hydration education as appropriate  - Include patient/family/caregiver in decisions related to nutrition  Outcome: Progressing

## 2020-08-16 LAB
ANION GAP SERPL CALCULATED.3IONS-SCNC: 8 MMOL/L (ref 4–13)
APTT PPP: 76 SECONDS (ref 23–37)
BASOPHILS # BLD MANUAL: 0 THOUSAND/UL (ref 0–0.1)
BASOPHILS NFR MAR MANUAL: 0 % (ref 0–1)
BUN SERPL-MCNC: 35 MG/DL (ref 5–25)
CALCIUM SERPL-MCNC: 7.8 MG/DL (ref 8.3–10.1)
CHLORIDE SERPL-SCNC: 107 MMOL/L (ref 100–108)
CO2 SERPL-SCNC: 30 MMOL/L (ref 21–32)
CREAT SERPL-MCNC: 2.7 MG/DL (ref 0.6–1.3)
EOSINOPHIL # BLD MANUAL: 0.05 THOUSAND/UL (ref 0–0.4)
EOSINOPHIL NFR BLD MANUAL: 1 % (ref 0–6)
ERYTHROCYTE [DISTWIDTH] IN BLOOD BY AUTOMATED COUNT: 13.8 % (ref 11.6–15.1)
GFR SERPL CREATININE-BSD FRML MDRD: 17 ML/MIN/1.73SQ M
GLUCOSE SERPL-MCNC: 104 MG/DL (ref 65–140)
GLUCOSE SERPL-MCNC: 106 MG/DL (ref 65–140)
GLUCOSE SERPL-MCNC: 109 MG/DL (ref 65–140)
GLUCOSE SERPL-MCNC: 119 MG/DL (ref 65–140)
GLUCOSE SERPL-MCNC: 126 MG/DL (ref 65–140)
GLUCOSE SERPL-MCNC: 93 MG/DL (ref 65–140)
HCT VFR BLD AUTO: 25.8 % (ref 34.8–46.1)
HGB BLD-MCNC: 7.7 G/DL (ref 11.5–15.4)
LYMPHOCYTES # BLD AUTO: 0.21 THOUSAND/UL (ref 0.6–4.47)
LYMPHOCYTES # BLD AUTO: 4 % (ref 14–44)
MACROCYTES BLD QL AUTO: PRESENT
MCH RBC QN AUTO: 30 PG (ref 26.8–34.3)
MCHC RBC AUTO-ENTMCNC: 29.8 G/DL (ref 31.4–37.4)
MCV RBC AUTO: 100 FL (ref 82–98)
MONOCYTES # BLD AUTO: 0 THOUSAND/UL (ref 0–1.22)
MONOCYTES NFR BLD: 0 % (ref 4–12)
NEUTROPHILS # BLD MANUAL: 4.94 THOUSAND/UL (ref 1.85–7.62)
NEUTS BAND NFR BLD MANUAL: 1 % (ref 0–8)
NEUTS SEG NFR BLD AUTO: 91 % (ref 43–75)
NRBC BLD AUTO-RTO: 0 /100 WBCS
NRBC BLD AUTO-RTO: 1 /100 WBC (ref 0–2)
PLATELET # BLD AUTO: 209 THOUSANDS/UL (ref 149–390)
PLATELET BLD QL SMEAR: ADEQUATE
PMV BLD AUTO: 10.4 FL (ref 8.9–12.7)
POTASSIUM SERPL-SCNC: 3.2 MMOL/L (ref 3.5–5.3)
RBC # BLD AUTO: 2.57 MILLION/UL (ref 3.81–5.12)
RBC MORPH BLD: PRESENT
SODIUM SERPL-SCNC: 145 MMOL/L (ref 136–145)
VARIANT LYMPHS # BLD AUTO: 3 %
WBC # BLD AUTO: 5.37 THOUSAND/UL (ref 4.31–10.16)

## 2020-08-16 PROCEDURE — 85730 THROMBOPLASTIN TIME PARTIAL: CPT | Performed by: SURGERY

## 2020-08-16 PROCEDURE — 99024 POSTOP FOLLOW-UP VISIT: CPT | Performed by: SURGERY

## 2020-08-16 PROCEDURE — 85027 COMPLETE CBC AUTOMATED: CPT | Performed by: SURGERY

## 2020-08-16 PROCEDURE — 80048 BASIC METABOLIC PNL TOTAL CA: CPT | Performed by: SURGERY

## 2020-08-16 PROCEDURE — 82948 REAGENT STRIP/BLOOD GLUCOSE: CPT

## 2020-08-16 PROCEDURE — 85007 BL SMEAR W/DIFF WBC COUNT: CPT | Performed by: SURGERY

## 2020-08-16 PROCEDURE — 99232 SBSQ HOSP IP/OBS MODERATE 35: CPT | Performed by: INTERNAL MEDICINE

## 2020-08-16 RX ORDER — POTASSIUM CHLORIDE 29.8 MG/ML
40 INJECTION INTRAVENOUS ONCE
Status: COMPLETED | OUTPATIENT
Start: 2020-08-16 | End: 2020-08-17

## 2020-08-16 RX ORDER — HYDRALAZINE HYDROCHLORIDE 20 MG/ML
10 INJECTION INTRAMUSCULAR; INTRAVENOUS EVERY 6 HOURS PRN
Status: DISCONTINUED | OUTPATIENT
Start: 2020-08-16 | End: 2020-08-24 | Stop reason: HOSPADM

## 2020-08-16 RX ORDER — LABETALOL 20 MG/4 ML (5 MG/ML) INTRAVENOUS SYRINGE
10 EVERY 4 HOURS PRN
Status: DISCONTINUED | OUTPATIENT
Start: 2020-08-16 | End: 2020-08-24 | Stop reason: HOSPADM

## 2020-08-16 RX ADMIN — HYDROMORPHONE HYDROCHLORIDE 0.5 MG: 1 INJECTION, SOLUTION INTRAMUSCULAR; INTRAVENOUS; SUBCUTANEOUS at 15:10

## 2020-08-16 RX ADMIN — HYDRALAZINE HYDROCHLORIDE 10 MG: 20 INJECTION INTRAMUSCULAR; INTRAVENOUS at 22:00

## 2020-08-16 RX ADMIN — METOROPROLOL TARTRATE 5 MG: 5 INJECTION, SOLUTION INTRAVENOUS at 15:10

## 2020-08-16 RX ADMIN — SODIUM CHLORIDE, SODIUM GLUCONATE, SODIUM ACETATE, POTASSIUM CHLORIDE AND MAGNESIUM CHLORIDE 75 ML/HR: 526; 502; 368; 37; 30 INJECTION, SOLUTION INTRAVENOUS at 04:12

## 2020-08-16 RX ADMIN — SODIUM CHLORIDE, SODIUM GLUCONATE, SODIUM ACETATE, POTASSIUM CHLORIDE AND MAGNESIUM CHLORIDE 75 ML/HR: 526; 502; 368; 37; 30 INJECTION, SOLUTION INTRAVENOUS at 14:27

## 2020-08-16 RX ADMIN — POTASSIUM CHLORIDE 40 MEQ: 29.8 INJECTION, SOLUTION INTRAVENOUS at 10:50

## 2020-08-16 RX ADMIN — METOROPROLOL TARTRATE 5 MG: 5 INJECTION, SOLUTION INTRAVENOUS at 09:24

## 2020-08-16 RX ADMIN — METOROPROLOL TARTRATE 5 MG: 5 INJECTION, SOLUTION INTRAVENOUS at 00:16

## 2020-08-16 RX ADMIN — APIXABAN 2.5 MG: 2.5 TABLET, FILM COATED ORAL at 10:50

## 2020-08-16 RX ADMIN — POTASSIUM CHLORIDE 40 MEQ: 29.8 INJECTION, SOLUTION INTRAVENOUS at 14:27

## 2020-08-16 RX ADMIN — APIXABAN 2.5 MG: 2.5 TABLET, FILM COATED ORAL at 17:12

## 2020-08-16 NOTE — PROGRESS NOTES
NEPHROLOGY PROGRESS NOTE   Nadja Ram 77 y o  female MRN: [id number]  Unit/Bed#: [facility] 507-01 Encounter: [id number]  Reason for Consult:  Acute kidney injury    Assessment/Plan:  1  Acute kidney injury, most likely secondary to hemodynamic fluctuations as well as obstructive uropathy given hydronephrosis  2  Chronic kidney disease previous baseline creatinine in the mid 2s to low 3's, follows with Dr Levy  3  Bilateral hydronephrosis to the level of the ileal conduit, history cystectomy with ileal conduit, negative loopogram  4  Postoperative incarcerated ileal conduit parastomal hernia status post ex lap and parastomal hernia repair  5  Postoperative ileus currently with NG tube placement  6  Postoperative anemia, continue monitor closely       PLAN:  · Overall renal function has continued to improve, back within previous baseline  · Continue IV fluids for next 24 hours likely discontinue when oral intake is more consistent  · Potassium replacement    SUBJECTIVE:  Seen examined  Patient able to eat breakfast   Still with significant abdominal discomfort  Denies any chest pain or shortness of breath  Review of Systems    OBJECTIVE:  Current Weight: Weight - Scale: 89 4 kg (197 lb 1 5 oz)  Vitals:    08/15/20 1900 08/15/20 2300 08/16/20 0459 08/16/20 0810   BP: 146/82 140/88  166/97   BP Location: Left arm Left arm  Left arm   Pulse: 82 85  83   Resp: 18 16  18   Temp: 98 7 °F (37 1 °C) 98 5 °F (36 9 °C)  98 3 °F (36 8 °C)   TempSrc: Oral Oral  Oral   SpO2: 95% 96%  98%   Weight:   89 4 kg (197 lb 1 5 oz)    Height:           Intake/Output Summary (Last 24 hours) at 8/16/2020 1002  Last data filed at 8/16/2020 0810  Gross per 24 hour   Intake 2180 96 ml   Output 1125 ml   Net 1055 96 ml       Physical Exam  HENT:      Head: Normocephalic and atraumatic  Eyes:      General: No scleral icterus  Cardiovascular:      Rate and Rhythm: Normal rate and regular rhythm     Pulmonary:      Effort: Pulmonary effort is normal       Breath sounds: Normal breath sounds  Abdominal:      Palpations: Abdomen is soft  Tenderness: There is abdominal tenderness  Musculoskeletal:      Right lower leg: No edema  Left lower leg: No edema  Skin:     General: Skin is warm and dry  Findings: No rash  Neurological:      Mental Status: She is alert and oriented to person, place, and time           Medications:    Current Facility-Administered Medications:     apixaban (ELIQUIS) tablet 2 5 mg, 2 5 mg, Oral, BID, Carlos Monroe MD    benzocaine (HURRICAINE) 20 % mucosal spray 2 spray, 2 spray, Mucosal, Once, Kahlil Barber MD, Stopped at 08/12/20 1544    HYDROmorphone (DILAUDID) injection 0 2 mg, 0 2 mg, Intravenous, Q3H PRN, Cristiana Mann MD, 0 2 mg at 08/12/20 0330    HYDROmorphone (DILAUDID) injection 0 5 mg, 0 5 mg, Intravenous, Q3H PRN, Cristiana Mann MD, 0 5 mg at 08/12/20 0708    HYDROmorphone (DILAUDID) injection 0 5 mg, 0 5 mg, Intravenous, Q3H PRN, Cristiana Mann MD, 0 5 mg at 08/11/20 0403    metoprolol (LOPRESSOR) injection 5 mg, 5 mg, Intravenous, Q8H, Cristiana Mann MD, 5 mg at 08/16/20 0924    multi-electrolyte (PLASMALYTE-A/ISOLYTE-S PH 7 4) IV solution, 75 mL/hr, Intravenous, Continuous, Mary Jane Ahuja MD, Last Rate: 75 mL/hr at 08/16/20 0412, 75 mL/hr at 08/16/20 0412    ondansetron [facility]) injection 4 mg, 4 mg, Intravenous, Q4H PRN, Cristiana Mann MD, 4 mg at 08/12/20 1959    Laboratory Results:  Results from last 7 days   Lab Units 08/16/20  0447 08/15/20  0505 08/14/20  0448 08/13/20  0542 08/13/20  0541 08/12/20  0504 08/11/20  2322 08/11/20  0436 08/10/20  0607   WBC Thousand/uL 5 37 4 70 5 38 6 21  --  5 08 5 42 4 93 5 61   HEMOGLOBIN g/dL 7 7* 7 8* 8 3* 9 9*  --  9 4* 10 2* 9 2* 10 4*   HEMATOCRIT % 25 8* 25 9* 27 0* 31 9*  --  29 0* 31 7* 28 5* 31 7*   PLATELETS Thousands/uL 209 200 210 263  --  179 198 184 238   POTASSIUM mmol/L 3 2* 3 4* 4 0  --  3 8 4 3  --  3 7 4 2 CHLORIDE mmol/L 107 106 103  --  100 103  --  103 110*   CO2 mmol/L 30 32 33*  --  31 27  --  27 16*   BUN mg/dL 35* 43* 46*  --  45* 45*  --  48* 58*   CREATININE mg/dL 2 70* 3 16* 3 64*  --  3 62* 3 72*  --  4 14* 4 88*   CALCIUM mg/dL 7 8* 7 9* 8 5  --  8 5 8 0*  --  7 4* 7 3*   MAGNESIUM mg/dL  --   --  2 4  --   --   --   --   --  1 6   PHOSPHORUS mg/dL  --   --  4 0  --   --   --   --   --  4 4*

## 2020-08-16 NOTE — RESTORATIVE TECHNICIAN NOTE
Restorative Specialist Mobility Note       Activity: Ambulate in room, Ambulate in [place], [address] privileges, Chair, Dangle, Stand at bedside(Educated/encouraged pt to ambulate with assistance 3-4 x's/day  Chair alarm on   Pt callbell, phone/tray within reach )     Assistive Device: Front wheel walker(Assist x1 with chair follow )      Damaris BAZAN, Restorative Technician, [employer]

## 2020-08-16 NOTE — PROGRESS NOTES
Progress Note - Jann Garcia 77 y o  female MRN: [id number]    Unit/Bed#: [facility] 507-01 Encounter: [id number]      Assessment:  77 F with incarcerated parastomal hernia s/p ex-lap, reduction and parastomal hernia repair with Phasix mesh, LUANN secondary to obstructive uropathy    VSS  Afebrile  Tmax: 99 1  Incision, clean, dry, intact  Abd soft/ nontender/ non distended    + bowel fcn  LLQ JOSE ANTONIO: 50cc serosang output     Plan:  Advance to surg soft diet  Start eliquis and discontinue heparin gtt  Maintain abdominal drain  Continue IVF for now  Maintain abdominal drain  Work with physical therapy    Subjective:   Noted 4x watery bowel mvts  Denied abdominal pain  Denied fever, chills, chest pain or shortness of breath  Objective:     Vitals: Blood pressure 140/88, pulse 85, temperature 98 5 °F (36 9 °C), temperature source Oral, resp  rate 16, height 5' (1 524 m), weight 89 4 kg (197 lb 1 5 oz), SpO2 96 %, not currently breastfeeding  ,Body mass index is 38 49 kg/m²  Intake/Output Summary (Last 24 hours) at 8/16/2020 0608  Last data filed at 8/16/2020 0459  Gross per 24 hour   Intake 1940 96 ml   Output 1525 ml   Net 415 96 ml       Physical Exam  General: NAD  HEENT: NC/AT  MMM  Cv: RRR  Lungs: normal effort  Ab: Soft, NT/ND  Incision c/d/i  JOSE ANTONIO drain in place     Ex: no CCE  Neuro: AAOx3    Scheduled Meds:  Current Facility-Administered Medications   Medication Dose Route Frequency Provider Last Rate    benzocaine  2 spray Mucosal Once Alexander Saba MD      heparin (porcine)  3-30 Units/kg/hr (Order-Specific) Intravenous Titrated Oleg Vasquez MD 14 Units/kg/hr (08/15/20 1620)    HYDROmorphone  0 2 mg Intravenous Q3H PRN Jessica Vasquez MD      HYDROmorphone  0 5 mg Intravenous Q3H PRN Jessica Vasquez MD      HYDROmorphone  0 5 mg Intravenous Q3H PRN Jessica Vasquez MD      metoprolol  5 mg Intravenous Desirae Davidson MD      multi-electrolyte  75 mL/hr Intravenous Continuous Perez Emanuel Araceli Lei MD 75 mL/hr (08/16/20 0412)    ondansetron  4 mg Intravenous Q4H PRN Ligia Jenkins MD      Kcentra (PROTHROMBIN)  35 Units/kg Intravenous Once Ligia Jenkins MD       Continuous Infusions:heparin (porcine), 3-30 Units/kg/hr (Order-Specific), Last Rate: 14 Units/kg/hr (08/15/20 1620)  multi-electrolyte, 75 mL/hr, Last Rate: 75 mL/hr (08/16/20 0412)      PRN Meds:  HYDROmorphone    HYDROmorphone    HYDROmorphone    ondansetron      Invasive Devices     Peripherally Inserted Central Catheter Line            PICC Line 08/14/20 Right Other (Comment) 1 day          Peripheral Intravenous Line            Peripheral IV 08/13/20 Distal;Right;Upper;Ventral (anterior) Arm 2 days    Peripheral IV 08/13/20 Left Antecubital 2 days          Drain            Urostomy Ileal conduit RUQ 1411 days    Closed/Suction Drain Left LLQ Bulb 19 Fr  6 days                Lab, Imaging and other studies: I have personally reviewed pertinent reports      VTE Pharmacologic Prophylaxis: Heparin  VTE Mechanical Prophylaxis: sequential compression device

## 2020-08-17 LAB
ANION GAP SERPL CALCULATED.3IONS-SCNC: 7 MMOL/L (ref 4–13)
APTT PPP: 38 SECONDS (ref 23–37)
BUN SERPL-MCNC: 31 MG/DL (ref 5–25)
CALCIUM SERPL-MCNC: 7.6 MG/DL (ref 8.3–10.1)
CHLORIDE SERPL-SCNC: 109 MMOL/L (ref 100–108)
CO2 SERPL-SCNC: 27 MMOL/L (ref 21–32)
CREAT SERPL-MCNC: 2.54 MG/DL (ref 0.6–1.3)
ERYTHROCYTE [DISTWIDTH] IN BLOOD BY AUTOMATED COUNT: 13.9 % (ref 11.6–15.1)
GFR SERPL CREATININE-BSD FRML MDRD: 18 ML/MIN/1.73SQ M
GLUCOSE SERPL-MCNC: 100 MG/DL (ref 65–140)
GLUCOSE SERPL-MCNC: 104 MG/DL (ref 65–140)
GLUCOSE SERPL-MCNC: 107 MG/DL (ref 65–140)
GLUCOSE SERPL-MCNC: 112 MG/DL (ref 65–140)
GLUCOSE SERPL-MCNC: 96 MG/DL (ref 65–140)
HCT VFR BLD AUTO: 23.7 % (ref 34.8–46.1)
HGB BLD-MCNC: 7.2 G/DL (ref 11.5–15.4)
MCH RBC QN AUTO: 30.3 PG (ref 26.8–34.3)
MCHC RBC AUTO-ENTMCNC: 30.4 G/DL (ref 31.4–37.4)
MCV RBC AUTO: 100 FL (ref 82–98)
NRBC BLD AUTO-RTO: 0 /100 WBCS
PHOSPHATE SERPL-MCNC: 2 MG/DL (ref 2.3–4.1)
PLATELET # BLD AUTO: 174 THOUSANDS/UL (ref 149–390)
PMV BLD AUTO: 10.3 FL (ref 8.9–12.7)
POTASSIUM SERPL-SCNC: 4.1 MMOL/L (ref 3.5–5.3)
RBC # BLD AUTO: 2.38 MILLION/UL (ref 3.81–5.12)
SODIUM SERPL-SCNC: 143 MMOL/L (ref 136–145)
WBC # BLD AUTO: 5.56 THOUSAND/UL (ref 4.31–10.16)

## 2020-08-17 PROCEDURE — 80048 BASIC METABOLIC PNL TOTAL CA: CPT | Performed by: SURGERY

## 2020-08-17 PROCEDURE — 99024 POSTOP FOLLOW-UP VISIT: CPT | Performed by: SURGERY

## 2020-08-17 PROCEDURE — 99232 SBSQ HOSP IP/OBS MODERATE 35: CPT | Performed by: INTERNAL MEDICINE

## 2020-08-17 PROCEDURE — 82948 REAGENT STRIP/BLOOD GLUCOSE: CPT

## 2020-08-17 PROCEDURE — 85730 THROMBOPLASTIN TIME PARTIAL: CPT | Performed by: SURGERY

## 2020-08-17 PROCEDURE — 85027 COMPLETE CBC AUTOMATED: CPT | Performed by: SURGERY

## 2020-08-17 PROCEDURE — 84100 ASSAY OF PHOSPHORUS: CPT | Performed by: INTERNAL MEDICINE

## 2020-08-17 RX ORDER — SIMETHICONE 80 MG
80 TABLET,CHEWABLE ORAL EVERY 6 HOURS PRN
Status: DISCONTINUED | OUTPATIENT
Start: 2020-08-17 | End: 2020-08-24 | Stop reason: HOSPADM

## 2020-08-17 RX ORDER — CEPHALEXIN 250 MG/1
250 CAPSULE ORAL EVERY 8 HOURS SCHEDULED
Status: DISCONTINUED | OUTPATIENT
Start: 2020-08-17 | End: 2020-08-17

## 2020-08-17 RX ADMIN — METOROPROLOL TARTRATE 5 MG: 5 INJECTION, SOLUTION INTRAVENOUS at 00:37

## 2020-08-17 RX ADMIN — SODIUM CHLORIDE, SODIUM GLUCONATE, SODIUM ACETATE, POTASSIUM CHLORIDE AND MAGNESIUM CHLORIDE 75 ML/HR: 526; 502; 368; 37; 30 INJECTION, SOLUTION INTRAVENOUS at 06:01

## 2020-08-17 RX ADMIN — HYDROMORPHONE HYDROCHLORIDE 0.2 MG: 1 INJECTION, SOLUTION INTRAMUSCULAR; INTRAVENOUS; SUBCUTANEOUS at 22:14

## 2020-08-17 RX ADMIN — HYDROMORPHONE HYDROCHLORIDE 0.2 MG: 1 INJECTION, SOLUTION INTRAMUSCULAR; INTRAVENOUS; SUBCUTANEOUS at 09:34

## 2020-08-17 RX ADMIN — METOROPROLOL TARTRATE 5 MG: 5 INJECTION, SOLUTION INTRAVENOUS at 07:56

## 2020-08-17 RX ADMIN — APIXABAN 2.5 MG: 2.5 TABLET, FILM COATED ORAL at 17:02

## 2020-08-17 RX ADMIN — APIXABAN 2.5 MG: 2.5 TABLET, FILM COATED ORAL at 08:00

## 2020-08-17 RX ADMIN — HYDROMORPHONE HYDROCHLORIDE 0.2 MG: 1 INJECTION, SOLUTION INTRAMUSCULAR; INTRAVENOUS; SUBCUTANEOUS at 02:54

## 2020-08-17 RX ADMIN — ONDANSETRON 4 MG: 2 INJECTION INTRAMUSCULAR; INTRAVENOUS at 14:29

## 2020-08-17 RX ADMIN — METOROPROLOL TARTRATE 5 MG: 5 INJECTION, SOLUTION INTRAVENOUS at 17:01

## 2020-08-17 NOTE — PHYSICAL THERAPY NOTE
Physical Therapy Cancellation Note    Patient's Name: Carroll Padilla    Chart reviewed  Met with pt at bedside, reports increased fatigue, was OOB with RN staff earlier today  Requests PT return next date  Will follow for PT treatment as medically appropriate  Thank you       Rashmi Pereira, PT, DPT

## 2020-08-17 NOTE — RESTORATIVE TECHNICIAN NOTE
Restorative Specialist Mobility Note       Activity: Ambulate in [place], Chair     Assistive Device: Front wheel walker        Repositioned: Supine, Other (Comment)(Bed Alarm)

## 2020-08-17 NOTE — PLAN OF CARE
Problem: Potential for Falls  Goal: Patient will remain free of falls  Description: INTERVENTIONS:  - Assess patient frequently for physical needs  -  Identify cognitive and physical deficits and behaviors that affect risk of falls    -  [place] fall precautions as indicated by assessment   - Educate patient/family on patient safety including physical limitations  - Instruct patient to call for assistance with activity based on assessment  - Modify environment to reduce risk of injury  - Consider OT/PT consult to assist with strengthening/mobility  Outcome: Progressing     Problem: Prexisting or High Potential for Compromised Skin Integrity  Goal: Skin integrity is maintained or improved  Description: INTERVENTIONS:  - Identify patients at risk for skin breakdown  - Assess and monitor skin integrity  - Assess and monitor nutrition and hydration status  - Monitor labs   - Assess for incontinence   - Turn and reposition patient  - Assist with mobility/ambulation  - Relieve pressure over bony prominences  - Avoid friction and shearing  - Provide appropriate hygiene as needed including keeping skin clean and dry  - Evaluate need for skin moisturizer/barrier cream  - Collaborate with interdisciplinary team   - Patient/family teaching  - Consider wound care consult   Outcome: Progressing     Problem: CARDIOVASCULAR - ADULT  Goal: Maintains optimal cardiac output and hemodynamic stability  Description: INTERVENTIONS:  - Monitor I/O, vital signs and rhythm  - Monitor for S/S and trends of decreased cardiac output  - Administer and titrate ordered vasoactive medications to optimize hemodynamic stability  - Assess quality of pulses, skin color and temperature  - Assess for signs of decreased coronary artery perfusion  - Instruct patient to report change in severity of symptoms  Outcome: Progressing  Goal: Absence of cardiac dysrhythmias or at baseline rhythm  Description: INTERVENTIONS:  - Continuous cardiac monitoring, vital signs, obtain 12 lead EKG if ordered  - Administer antiarrhythmic and heart rate control medications as ordered  - Monitor electrolytes and administer replacement therapy as ordered  Outcome: Progressing     Problem: RESPIRATORY - ADULT  Goal: Achieves optimal ventilation and oxygenation  Description: INTERVENTIONS:  - Assess for changes in respiratory status  - Assess for changes in mentation and behavior  - Position to facilitate oxygenation and minimize respiratory effort  - Oxygen administered by appropriate delivery if ordered  - Initiate smoking cessation education as indicated  - Encourage broncho-pulmonary hygiene including cough, deep breathe, Incentive Spirometry  - Assess the need for suctioning and aspirate as needed  - Assess and instruct to report SOB or any respiratory difficulty  - Respiratory Therapy support as indicated  Outcome: Progressing     Problem: GASTROINTESTINAL - ADULT  Goal: Minimal or absence of nausea and/or vomiting  Description: INTERVENTIONS:  - Administer IV fluids if ordered to ensure adequate hydration  - Maintain NPO status until nausea and vomiting are resolved  - Nasogastric tube if ordered  - Administer ordered antiemetic medications as needed  - Provide nonpharmacologic comfort measures as appropriate  - Advance diet as tolerated, if ordered  - Consider nutrition services referral to assist patient with adequate nutrition and appropriate food choices  Outcome: Progressing  Goal: Maintains or returns to baseline bowel function  Description: INTERVENTIONS:  - Assess bowel function  - Encourage oral fluids to ensure adequate hydration  - Administer IV fluids if ordered to ensure adequate hydration  - Administer ordered medications as needed  - Encourage mobilization and activity  - Consider nutritional services referral to assist patient with adequate nutrition and appropriate food choices  Outcome: Progressing  Goal: Maintains adequate nutritional intake  Description: INTERVENTIONS:  - Monitor percentage of each meal consumed  - Identify factors contributing to decreased intake, treat as appropriate  - Assist with meals as needed  - Monitor I&O, weight, and lab values if indicated  - Obtain nutrition services referral as needed  Outcome: Progressing  Goal: Establish and maintain optimal ostomy function  Description: INTERVENTIONS:  - Assess bowel function  - Encourage oral fluids to ensure adequate hydration  - Administer IV fluids if ordered to ensure adequate hydration   - Administer ordered medications as needed  - Encourage mobilization and activity  - Nutrition services referral to assist patient with appropriate food choices  - Assess stoma site  - Consider wound care consult   Outcome: Progressing     Problem: METABOLIC, FLUID AND ELECTROLYTES - ADULT  Goal: Electrolytes maintained within normal limits  Description: INTERVENTIONS:  - Monitor labs and assess patient for signs and symptoms of electrolyte imbalances  - Administer electrolyte replacement as ordered  - Monitor response to electrolyte replacements, including repeat lab results as appropriate  - Instruct patient on fluid and nutrition as appropriate  Outcome: Progressing  Goal: Fluid balance maintained  Description: INTERVENTIONS:  - Monitor labs   - Monitor I/O and WT  - Instruct patient on fluid and nutrition as appropriate  - Assess for signs & symptoms of volume excess or deficit  Outcome: Progressing  Goal: Glucose maintained within target range  Description: INTERVENTIONS:  - Monitor Blood Glucose as ordered  - Assess for signs and symptoms of hyperglycemia and hypoglycemia  - Administer ordered medications to maintain glucose within target range  - Assess nutritional intake and initiate nutrition service referral as needed  Outcome: Progressing     Problem: Nutrition/Hydration-ADULT  Goal: Nutrient/Hydration intake appropriate for improving, restoring or maintaining nutritional needs  Description: Monitor and assess patient's nutrition/hydration status for malnutrition  Collaborate with interdisciplinary team and initiate plan and interventions as ordered  Monitor patient's weight and dietary intake as ordered or per policy  Utilize nutrition screening tool and intervene as necessary  Determine patient's food preferences and provide high-protein, high-caloric foods as appropriate       INTERVENTIONS:  - Monitor oral intake, urinary output, labs, and treatment plans  - Assess nutrition and hydration status and recommend course of action  - Evaluate amount of meals eaten  - Assist patient with eating if necessary   - Allow adequate time for meals  - Recommend/ encourage appropriate diets, oral nutritional supplements, and vitamin/mineral supplements  - Order, calculate, and assess calorie counts as needed  - Recommend, monitor, and adjust tube feedings and TPN/PPN based on assessed needs  - Assess need for intravenous fluids  - Provide specific nutrition/hydration education as appropriate  - Include patient/family/caregiver in decisions related to nutrition  Outcome: Progressing

## 2020-08-17 NOTE — QUICK NOTE
JOSE ANTONIO drain removed with no complications  Patient tolerated procedure well  Gauze placed over wound

## 2020-08-17 NOTE — PROGRESS NOTES
Progress Note - General Surgery   Pam Guzman 77 y o  female MRN: [id number]  Unit/Bed#: [facility] 189-74 Encounter: [id number]    Assessment:  Pam Guzman is a 77 y o  female w/ incarcerated parastomal hernia s/p ex-lap, reduction and parastomal hernia repair with Phasix mesh, LUANN secondary to obstructive uropathy    Plan:  Continue diet as tolerated  Discontinue IVF  Discontinue JOSE ANTONIO drain  PT/OT/Dispo    Subjective/Objective     Subjective: Doing well  Tolerating diet  Objective:    Blood pressure 163/86, pulse 92, temperature 97 6 °F (36 4 °C), temperature source Oral, resp  rate 18, height 5' (1 524 m), weight 89 4 kg (197 lb 1 5 oz), SpO2 96 %, not currently breastfeeding  ,Body mass index is 38 49 kg/m²  Intake/Output Summary (Last 24 hours) at 8/17/2020 1723  Last data filed at 8/17/2020 1443  Gross per 24 hour   Intake 2410 ml   Output 915 ml   Net 1495 ml       Invasive Devices     Peripherally Inserted Central Catheter Line            PICC Line 08/14/20 Right Other (Comment) 2 days          Drain            Urostomy Ileal conduit RUQ 1412 days    Closed/Suction Drain Left LLQ Bulb 19 Fr  7 days                Physical Exam:   GEN: NAD  HEENT: MMM  CV: RRR  Lung: normal effort  Ab: Soft, NT/ND  JOSE ANTONIO serous  Minor erythema at inferior aspect of incision and LLQ  Ileal conduit  Extrem: No CCE  Neuro:  A+Ox3, motor and sensation grossly intact    Lab, Imaging and other studies:  CBC:   Lab Results   Component Value Date    WBC 5 56 08/17/2020    HGB 7 2 (L) 08/17/2020    HCT 23 7 (L) 08/17/2020     (H) 08/17/2020     08/17/2020    MCH 30 3 08/17/2020    MCHC 30 4 (L) 08/17/2020    RDW 13 9 08/17/2020    MPV 10 3 08/17/2020    NRBC 0 08/17/2020   , CMP:   Lab Results   Component Value Date    SODIUM 143 08/17/2020    K 4 1 08/17/2020     (H) 08/17/2020    CO2 27 08/17/2020    BUN 31 (H) 08/17/2020    CREATININE 2 54 (H) 08/17/2020    CALCIUM 7 6 (L) 08/17/2020    EGFR 18 08/17/2020     VTE Pharmacologic Prophylaxis: Eliquis  VTE Mechanical Prophylaxis: sequential compression device

## 2020-08-17 NOTE — WOUND OSTOMY CARE
Progress Note - Wound   Courtney Eubanks 77 y o  female MRN: [id number]  Unit/Bed#: [facility] 342-21 Encounter: [id number]      Assessment: Patient is seen for wound care assessment this week  The patient is out of bed in the chair sitting on the [facility] cushion   She has a urostomy that she does the bag changes independently   Min A of 2 for standing at the chair using the walker   Assessment Findings   1  Bilateral heels dry and intact   2  Sacral is dry and intact   3  Right lateral abdominal are - 2 open partial thickness wounds related to the shingles - Will continue with the same plan of care to the area   Please refer to the plan listed below and dressing changes completed   Plan:     Skin care plans:  1-Hydraguard to bilateral sacrum, buttock and heels BID and PRN  2-Elevate heels to offload pressure  3-Ehob cushion in chair when out of bed  4-Moisturize skin daily with skin nourishing cream   5-Turn/reposition q2h or when medically stable for pressure re-distribution on skin  6  Right lateral  abdominal shingles - cleanse with soap and water then pat dry apply adaptic then maxorb and then a ABD change every other day            Objective:    Vitals: Blood pressure 139/75, pulse 87, temperature 98 5 °F (36 9 °C), temperature source Oral, resp  rate 18, height 5' (1 524 m), weight 89 4 kg (197 lb 1 5 oz), SpO2 96 %, not currently breastfeeding  ,Body mass index is 38 49 kg/m²      Wound 08/10/20 Dermatologic/Rash Abdomen N/A (Active)   Wound Image   08/17/20 1100   Wound Description CONSTANCE 08/17/20 1200   Fina-wound Assessment [facility] 08/17/20 1200   Wound Length (cm) 1 5 cm 08/17/20 1100   Wound Width (cm) 1 cm 08/17/20 1100   Wound Depth (cm) 0 1 cm 08/17/20 1100   Wound Surface Area (cm^2) 1 5 cm^2 08/17/20 1100   Wound Volume (cm^3) 0 15 cm^3 08/17/20 1100   Calculated Wound Volume (cm^3) 0 15 cm^3 08/17/20 1100   Change in Wound Size % 80 08/17/20 1100   Closure None 08/16/20 1130   Drainage Amount Scant 08/17/20 1100   Drainage Description Serosanguineous 08/17/20 1100   Non-staged Wound Description Partial thickness 08/17/20 1100   Treatments Cleansed;Site care 08/17/20 1100   Dressing ABD 08/17/20 1200   Dressing Changed Changed 08/17/20 1100   Patient Tolerance Tolerated well 08/17/20 1100   Dressing Status Clean;Dry; Intact 08/17/20 1200       Wound 08/11/20 Abdomen Mid (Active)   Wound Description CONSTANCE 08/17/20 1200   Fina-wound Assessment CONSTANCE 08/17/20 1200   Closure [place]ples 08/16/20 2000   Drainage Amount None 08/16/20 1130   Drainage Description COSNTANCE 08/15/20 0930   Dressing ABD 08/17/20 1200   Dressing Changed Changed 08/15/20 1619   Patient Tolerance Tolerated well 08/13/20 1645   Dressing Status Clean;Dry; Intact 08/17/20 1200     Wound care will follow weekly call with questions or concerns     Tania Buck RN BSN Adela Villalobos

## 2020-08-17 NOTE — PROGRESS NOTES
Sangeeta Gil visited with patient provided a blessing and Sacrament of the Sick/Anointing and a blessing       08/17/20 1100   Clinical Encounter Type   Visited With Patient   Routine Visit Introduction   Continue Visiting Yes   Mosque Encounters   Mosque Needs Prayer   Sacramental Encounters   Sacrament of Sick-Anointing Anointed

## 2020-08-17 NOTE — PROGRESS NOTES
NEPHROLOGY PROGRESS NOTE   Rogelio Anna 77 y o  female MRN: [id number]  Unit/Bed#: [facility] 507-01 Encounter: [id number]      ASSESSMENT & PLAN    1  Acute Kidney Injury secondary to hemodynamic fluctuations as well as obstructive uropathy given hydronephrosis  o Urine:  Red, turbid large blood positive nitrite large leukocytes from July 14  o Renal US:  Multiple calculi at the inferior pole of the right kidney bilateral hydroureteronephrosis to the level of the ileal conduit  o Plan:  Is making some urine in creatinine is back to baseline tolerating p o  Intake will discontinue intravenous fluid given positive fluid balance overall  2  Electrolytes:  o Currently stable  3  Acid/Base:  o No anion gap and bicarb acceptable  4  Hypertension  o Currently just on metoprolol as needed avoid hypotension  5  Volume Status  o Now with positive fluid balance tolerating p o  Intake will discontinue IV fluid  6  Anemia of chronic kidney disease  o Hemoglobin is trending downward  7  MBD  o Phosphorus slightly low, encourage p o  Intake and will monitor  8  Clinical Course/Health Maintanance/Risk Reduction  o With incarcerated parastomal hernia status post ex lap with reduction and parastomal hernia repair with mesh      SUBJECTIVE:    Patient was seen today  Overall fatigue increased gas ostomy output acceptable  Tolerating some p o   Intake over 80% of her lunch eaten  Town fluid intake  Positive intakes over output    OBJECTIVE:  Current Weight: Weight - Scale: 89 4 kg (197 lb 1 5 oz)  Vitals:    08/17/20 0823   BP:    Pulse: 87   Resp: 18   Temp: 98 5 °F (36 9 °C)   SpO2: 96%       Intake/Output Summary (Last 24 hours) at 8/17/2020 1308  Last data filed at 8/17/2020 1201  Gross per 24 hour   Intake 3811 25 ml   Output 1490 ml   Net 2321 25 ml     Weight (last 2 days)     Date/Time   Weight    08/16/20 0459   89 4 (197 09)              General: conscious, cooperative, in not acute distress  Eyes: conjunctivae pink, anicteric sclerae  ENT: lips and mucous membranes moist  Neck: supple, no JVD  Chest: clear breath sounds bilateral, no crackles, ronchus or wheezings  CVS: distinct S1 & S2, normal rate, regular rhythm  Abdomen:  Distended but nontender, positive ileostomy  Extremities:  Trace edema positive SCDs  Skin: no rash  Neuro: awake, alert, oriented      Medications:    Current Facility-Administered Medications:     apixaban (ELIQUIS) tablet 2 5 mg, 2 5 mg, Oral, BID, Varun Nunez MD, 2 5 mg at 08/17/20 0800    benzocaine (HURRICAINE) 20 % mucosal spray 2 spray, 2 spray, Mucosal, Once, Titi Herzog MD, Stopped at 08/12/20 1544    hydrALAZINE (APRESOLINE) injection 10 mg, 10 mg, Intravenous, Q6H PRN, Varun Nunez MD, 10 mg at 08/16/20 2200    HYDROmorphone (DILAUDID) injection 0 2 mg, 0 2 mg, Intravenous, Q3H PRN, Cleaster Cowden, MD, 0 2 mg at 08/17/20 0934    HYDROmorphone (DILAUDID) injection 0 5 mg, 0 5 mg, Intravenous, Q3H PRN, Cleaster Cowden, MD, 0 5 mg at 08/12/20 0708    HYDROmorphone (DILAUDID) injection 0 5 mg, 0 5 mg, Intravenous, Q3H PRN, Cleaster Cowden, MD, 0 5 mg at 08/16/20 1510    Labetalol HCl (NORMODYNE) injection 10 mg, 10 mg, Intravenous, Q4H PRN, Varun Nunez MD    metoprolol (LOPRESSOR) injection 5 mg, 5 mg, Intravenous, Q8H, Cleaster Cowden, MD, 5 mg at 08/17/20 0756    multi-electrolyte (PLASMALYTE-A/ISOLYTE-S PH 7 4) IV solution, 75 mL/hr, Intravenous, Continuous, Marya Rodrigez MD, Last Rate: 75 mL/hr at 08/17/20 0601, 75 mL/hr at 08/17/20 0601    ondansetron [facility]) injection 4 mg, 4 mg, Intravenous, Q4H PRN, Cleaster Cowden, MD, 4 mg at 08/12/20 1959    Invasive Devices:      Lab Results:   Results from last 7 days   Lab Units 08/17/20  0445 08/17/20  0444 08/16/20  0447 08/15/20  0505 08/14/20  0448 08/13/20  0542 08/13/20  0541   WBC Thousand/uL 5 56  --  5 37 4 70 5 38 6 21  --    HEMOGLOBIN g/dL 7 2*  --  7 7* 7 8* 8 3* 9 9*  --    HEMATOCRIT % 23 7*  --  25 8* 25 9* 27 0* 31 9*  --    PLATELETS Thousands/uL 174  --  209 200 210 263  --    POTASSIUM mmol/L  --  4 1 3 2* 3 4* 4 0  --  3 8   CHLORIDE mmol/L  --  109* 107 106 103  --  100   CO2 mmol/L  --  27 30 32 33*  --  31   BUN mg/dL  --  31* 35* 43* 46*  --  45*   CREATININE mg/dL  --  2 54* 2 70* 3 16* 3 64*  --  3 62*   CALCIUM mg/dL  --  7 6* 7 8* 7 9* 8 5  --  8 5   MAGNESIUM mg/dL  --   --   --   --  2 4  --   --    PHOSPHORUS mg/dL  --  2 0*  --   --  4 0  --   --        Previous work up:  Please see previous notes

## 2020-08-18 LAB
ANION GAP SERPL CALCULATED.3IONS-SCNC: 7 MMOL/L (ref 4–13)
BUN SERPL-MCNC: 31 MG/DL (ref 5–25)
CALCIUM SERPL-MCNC: 7.7 MG/DL (ref 8.3–10.1)
CHLORIDE SERPL-SCNC: 110 MMOL/L (ref 100–108)
CO2 SERPL-SCNC: 25 MMOL/L (ref 21–32)
CREAT SERPL-MCNC: 2.75 MG/DL (ref 0.6–1.3)
ERYTHROCYTE [DISTWIDTH] IN BLOOD BY AUTOMATED COUNT: 13.7 % (ref 11.6–15.1)
GFR SERPL CREATININE-BSD FRML MDRD: 16 ML/MIN/1.73SQ M
GLUCOSE SERPL-MCNC: 103 MG/DL (ref 65–140)
GLUCOSE SERPL-MCNC: 105 MG/DL (ref 65–140)
GLUCOSE SERPL-MCNC: 108 MG/DL (ref 65–140)
GLUCOSE SERPL-MCNC: 111 MG/DL (ref 65–140)
GLUCOSE SERPL-MCNC: 98 MG/DL (ref 65–140)
HCT VFR BLD AUTO: 25.1 % (ref 34.8–46.1)
HGB BLD-MCNC: 7.7 G/DL (ref 11.5–15.4)
MCH RBC QN AUTO: 30.1 PG (ref 26.8–34.3)
MCHC RBC AUTO-ENTMCNC: 30.7 G/DL (ref 31.4–37.4)
MCV RBC AUTO: 98 FL (ref 82–98)
NRBC BLD AUTO-RTO: 0 /100 WBCS
PLATELET # BLD AUTO: 187 THOUSANDS/UL (ref 149–390)
PMV BLD AUTO: 10.1 FL (ref 8.9–12.7)
POTASSIUM SERPL-SCNC: 3.8 MMOL/L (ref 3.5–5.3)
RBC # BLD AUTO: 2.56 MILLION/UL (ref 3.81–5.12)
SODIUM SERPL-SCNC: 142 MMOL/L (ref 136–145)
WBC # BLD AUTO: 7.5 THOUSAND/UL (ref 4.31–10.16)

## 2020-08-18 PROCEDURE — 85027 COMPLETE CBC AUTOMATED: CPT | Performed by: SURGERY

## 2020-08-18 PROCEDURE — 80048 BASIC METABOLIC PNL TOTAL CA: CPT | Performed by: SURGERY

## 2020-08-18 PROCEDURE — 97535 SELF CARE MNGMENT TRAINING: CPT

## 2020-08-18 PROCEDURE — 97112 NEUROMUSCULAR REEDUCATION: CPT

## 2020-08-18 PROCEDURE — 99024 POSTOP FOLLOW-UP VISIT: CPT | Performed by: SURGERY

## 2020-08-18 PROCEDURE — 97530 THERAPEUTIC ACTIVITIES: CPT

## 2020-08-18 PROCEDURE — 99232 SBSQ HOSP IP/OBS MODERATE 35: CPT | Performed by: INTERNAL MEDICINE

## 2020-08-18 PROCEDURE — 82948 REAGENT STRIP/BLOOD GLUCOSE: CPT

## 2020-08-18 PROCEDURE — 97116 GAIT TRAINING THERAPY: CPT

## 2020-08-18 RX ORDER — LANOLIN ALCOHOL/MO/W.PET/CERES
3 CREAM (GRAM) TOPICAL
Status: DISCONTINUED | OUTPATIENT
Start: 2020-08-18 | End: 2020-08-24 | Stop reason: HOSPADM

## 2020-08-18 RX ORDER — POTASSIUM CHLORIDE 20 MEQ/1
40 TABLET, EXTENDED RELEASE ORAL ONCE
Status: COMPLETED | OUTPATIENT
Start: 2020-08-18 | End: 2020-08-18

## 2020-08-18 RX ORDER — CITALOPRAM 20 MG/1
20 TABLET ORAL DAILY
Status: DISCONTINUED | OUTPATIENT
Start: 2020-08-18 | End: 2020-08-24 | Stop reason: HOSPADM

## 2020-08-18 RX ORDER — OXYCODONE HYDROCHLORIDE 5 MG/1
2.5 TABLET ORAL EVERY 4 HOURS PRN
Status: DISCONTINUED | OUTPATIENT
Start: 2020-08-18 | End: 2020-08-24 | Stop reason: HOSPADM

## 2020-08-18 RX ORDER — LEVOTHYROXINE SODIUM 0.07 MG/1
75 TABLET ORAL
Status: DISCONTINUED | OUTPATIENT
Start: 2020-08-18 | End: 2020-08-24 | Stop reason: HOSPADM

## 2020-08-18 RX ORDER — OXYCODONE HYDROCHLORIDE 5 MG/1
5 TABLET ORAL EVERY 4 HOURS PRN
Status: DISCONTINUED | OUTPATIENT
Start: 2020-08-18 | End: 2020-08-24 | Stop reason: HOSPADM

## 2020-08-18 RX ADMIN — POTASSIUM CHLORIDE 40 MEQ: 1500 TABLET, EXTENDED RELEASE ORAL at 08:45

## 2020-08-18 RX ADMIN — METOPROLOL TARTRATE 25 MG: 25 TABLET, FILM COATED ORAL at 21:10

## 2020-08-18 RX ADMIN — APIXABAN 2.5 MG: 2.5 TABLET, FILM COATED ORAL at 08:45

## 2020-08-18 RX ADMIN — CITALOPRAM HYDROBROMIDE 20 MG: 20 TABLET ORAL at 12:07

## 2020-08-18 RX ADMIN — MELATONIN 3 MG: at 21:10

## 2020-08-18 RX ADMIN — METOROPROLOL TARTRATE 5 MG: 5 INJECTION, SOLUTION INTRAVENOUS at 08:45

## 2020-08-18 RX ADMIN — METOROPROLOL TARTRATE 5 MG: 5 INJECTION, SOLUTION INTRAVENOUS at 01:00

## 2020-08-18 RX ADMIN — APIXABAN 2.5 MG: 2.5 TABLET, FILM COATED ORAL at 17:11

## 2020-08-18 RX ADMIN — OXYCODONE HYDROCHLORIDE 2.5 MG: 5 TABLET ORAL at 12:08

## 2020-08-18 RX ADMIN — SIMETHICONE 80 MG: 80 TABLET, CHEWABLE ORAL at 15:39

## 2020-08-18 NOTE — PLAN OF CARE
Problem: OCCUPATIONAL THERAPY ADULT  Goal: Performs self-care activities at highest level of function for planned discharge setting  See evaluation for individualized goals  Description: Treatment Interventions: ADL retraining, Functional transfer training, UE strengthening/ROM, Endurance training, Cognitive reorientation, Patient/family training, Equipment evaluation/education, Compensatory technique education, Activityengagement, Energy conservation          See flowsheet documentation for full assessment, interventions and recommendations  Outcome: Progressing  Note: Limitation: Decreased ADL status, Decreased UE strength, Decreased Safe judgement during ADL, Decreased cognition, Decreased endurance, Decreased high-level ADLs  Prognosis: Fair  Assessment: Pt participated in OT treatment session today to address barriers to occupational performance  Interventions focused on ADL retraining, functional transfers, functional mobility, and activity tolerance  Pt agreeable to OT treatment today  Upon OT arrival, Pt was found OOB in chair  Pt participated in toileting, UB/LB bathing, UB/LB dressing, grooming tasks, STS, toilet transfers, and short-distance household mobility - refer to chart for A levels  Pt requires encouragement and cueing throughout session  In comparison to last session, Pt is progressing with treatment goals but is limited by fatigue  Pt educated on safety awareness and energy conservation Pt demonstrated understanding  Pt will benefit from continued OT treatment t/o hospital stay to address limitations to occupational performance as defined above to maximize functional independence   Recommend post-acute rehab following d/c       OT Discharge Recommendation: Post-Acute Rehabilitation Services  OT - OK to Discharge: Yes(when medically cleared)

## 2020-08-18 NOTE — PROGRESS NOTES
NEPHROLOGY PROGRESS NOTE   David Hutton 77 y o  female MRN: [id number]  Unit/Bed#: [facility] 507-01 Encounter: [id number]      ASSESSMENT & PLAN    1  Acute Kidney Injury secondary to hemodynamic fluctuations as well as obstructive uropathy given hydronephrosis  ? Urine:  Red, turbid large blood positive nitrite large leukocytes from July 14  ? Renal US:  Multiple calculi at the inferior pole of the right kidney bilateral hydroureteronephrosis to the level of the ileal conduit  ? Plan:  Is making some urine and creatinine is back to baseline tolerating p o  Intake will discontinue intravenous fluid oral mucosa is moist monitor urine output  2  Electrolytes:  ? Currently stable  3  Acid/Base:  ? No anion gap and bicarb acceptable  4  Hypertension  ? Currently just on metoprolol as needed avoid hypotension  ? Blood pressure is slightly lower will place hold parameters on the metoprolol of 130 does not appear to have a history of atrial fibrillation  5  Volume Status  ? Now with positive fluid balance tolerating p o  Intake will discontinue IV fluid  6  Anemia of chronic kidney disease  ? Hemoglobin is trending downward  7  MBD  ? Phosphorus slightly low, encourage p o  Intake and will monitor  8  Clinical Course/Health Maintanance/Risk Reduction  ?  With incarcerated parastomal hernia status post ex lap with reduction and parastomal hernia repair with mesh    SUBJECTIVE:    Patient was seen today  Stable off intravenous fluid    OBJECTIVE:  Current Weight: Weight - Scale: 89 4 kg (197 lb 1 5 oz)  Vitals:    08/18/20 0659   BP: 110/78   Pulse: 94   Resp: 18   Temp: 98 6 °F (37 °C)   SpO2: 95%       Intake/Output Summary (Last 24 hours) at 8/18/2020 1437  Last data filed at 8/18/2020 1301  Gross per 24 hour   Intake 670 5 ml   Output 2070 ml   Net -1399 5 ml     Weight (last 2 days)     Date/Time   Weight    08/16/20 0459   89 4 (197 09)              General: conscious, cooperative, in not acute distress  Eyes: conjunctivae pink, anicteric sclerae  ENT: lips and mucous membranes moist  Neck: supple, no JVD  Chest: clear breath sounds bilateral, no crackles, ronchus or wheezings  CVS: distinct S1 & S2, normal rate, regular rhythm  Abdomen: soft, non-tender, non-distended, normoactive bowel sounds  Extremities: no edema of both legs  Skin: no rash  Neuro: awake, alert, oriented      Medications:    Current Facility-Administered Medications:     apixaban (ELIQUIS) tablet 2 5 mg, 2 5 mg, Oral, BID, Governor Jimmie MD, 2 5 mg at 08/18/20 0845    benzocaine (HURRICAINE) 20 % mucosal spray 2 spray, 2 spray, Mucosal, Once, Sayda Land MD, Stopped at 08/12/20 1544    citalopram (CeleXA) tablet 20 mg, 20 mg, Oral, Daily, Heriberto Harvey PA-C, 20 mg at 08/18/20 1207    hydrALAZINE (APRESOLINE) injection 10 mg, 10 mg, Intravenous, Q6H PRN, Governor Jimmie MD, 10 mg at 08/16/20 2200    HYDROmorphone (DILAUDID) injection 0 5 mg, 0 5 mg, Intravenous, Q3H PRN, Cassandra Araiza MD, 0 5 mg at 08/16/20 1510    Labetalol HCl (NORMODYNE) injection 10 mg, 10 mg, Intravenous, Q4H PRN, Governor Jimmie MD    levothyroxine tablet 75 mcg, 75 mcg, Oral, Early Morning, Marge Temple PA-C    melatonin tablet 3 mg, 3 mg, Oral, HS, Marge Temple PA-C    metoprolol tartrate (LOPRESSOR) tablet 25 mg, 25 mg, Oral, Q12H [address], Leonel Pepper, DO    ondansetron (ZOFRAN) injection 4 mg, 4 mg, Intravenous, Q4H PRN, Cassandra Araiza MD, 4 mg at 08/17/20 1429    oxyCODONE (ROXICODONE) IR tablet 2 5 mg, 2 5 mg, Oral, Q4H PRN, Heriberto Harvey PA-C, 2 5 mg at 08/18/20 1208    oxyCODONE (ROXICODONE) IR tablet 5 mg, 5 mg, Oral, Q4H PRN, Heriberto Harvey PA-C    simethicone (MYLICON) chewable tablet 80 mg, 80 mg, Oral, Q6H PRN, VICTORINA Sykes    Invasive Devices:      Lab Results:   Results from last 7 days   Lab Units 08/18/20  0532 08/17/20  0445 08/17/20  0444 08/16/20  0447 08/15/20  0505 08/14/20  0448   WBC Thousand/uL 7 50 5 56  --  5 37 4 70 5 38   HEMOGLOBIN g/dL 7  7* 7 2*  --  7 7* 7 8* 8 3*   HEMATOCRIT % 25 1* 23 7*  --  25 8* 25 9* 27 0*   PLATELETS Thousands/uL 187 174  --  209 200 210   POTASSIUM mmol/L 3 8  --  4 1 3 2* 3 4* 4 0   CHLORIDE mmol/L 110*  --  109* 107 106 103   CO2 mmol/L 25  --  27 30 32 33*   BUN mg/dL 31*  --  31* 35* 43* 46*   CREATININE mg/dL 2 75*  --  2 54* 2 70* 3 16* 3 64*   CALCIUM mg/dL 7 7*  --  7 6* 7 8* 7 9* 8 5   MAGNESIUM mg/dL  --   --   --   --   --  2 4   PHOSPHORUS mg/dL  --   --  2 0*  --   --  4 0       Previous work up:  Please see previous notes

## 2020-08-18 NOTE — SOCIAL WORK
Met with patient and her son, Whitney Riley  He asks for snf list for [place] of his zip code [phone]  This was provide to him to review  He will inform CM of choices tomorrow

## 2020-08-18 NOTE — PHYSICAL THERAPY NOTE
Physical Therapy Progress Note     08/18/20 0825   Pain Assessment   Pain Assessment Tool 0-10   Pain Score 3   Pain Location/Orientation Orientation: Right;Orientation: Lower; Location: Abdomen   Hospital Pain Intervention(s) Repositioned; Ambulation/increased activity; Emotional support   Restrictions/Precautions   Other Precautions Contact/isolation;Pain; Fall Risk   Subjective   Subjective The pt  states that she is feeling good today, and that she is ready to go for a walk  Transfers   Sit to Stand 4  Minimal assistance   Additional items Assist x 1   Stand to Sit 5  Supervision   Additional items Increased time required   Ambulation/Elevation   Gait pattern Excessively slow; Short stride; Inconsistent wisam;Decreased foot clearance; Forward Flexion   Gait Assistance 4  Minimal assist   Additional items Assist x 1;Verbal cues   Assistive Device Rolling walker   Distance 80 feet  Balance   Static Sitting Good   Dynamic Sitting Fair +   Static Standing Fair   Ambulatory Fair -   Activity Tolerance   Activity Tolerance Patient tolerated treatment well   Nurse [address], RN  Assessment   Prognosis Good   Problem List Decreased strength;Decreased endurance; Impaired balance;Decreased mobility; Decreased safety awareness;Pain   Assessment The pt  has improving mobility today as she was able to ambulate down the [place]anson with less assistance  She had no loss of balance, but she did require increased time in order to complete  She has improving balance with transfers today as well  She does continue to remain limited, but she is ambulating household distances  She will benefit from continued therapy in order to maximize her functional mobility and independence  Barriers to Discharge Inaccessible home environment   Goals   Patient Goals To regain her independence     STG Expiration Date 08/26/20   PT Treatment Day 2   Plan   Treatment/Interventions Functional transfer training;PAUL strengthening/ROM; Elevations; Therapeutic exercise; Endurance training;Patient/family training;Bed mobility;Gait training   Progress Progressing toward goals   PT Frequency   (3-5x a week )   Recommendation   PT Discharge Recommendation Post-Acute Rehabilitation Services   Equipment Recommended Keanu Lewis, JOSE DE JESUS

## 2020-08-18 NOTE — SOCIAL WORK
A post acute care recommendation was made by your care team for STR  Discussed Freedom of Choice with patient  List of facilities given to patient via in person  patient aware the list is custom filtered for them by zip code location and that [facility] post acute providers are designated  Cm will f/u re choices later today

## 2020-08-18 NOTE — PROGRESS NOTES
Progress Note - Amos Fernandez 77 y o  female MRN: [id number]    Unit/Bed#: [facility] 843-44 Encounter: [id number]      Assessment:  Amos Fernandez is a 77 y o  female w/ incarcerated parastomal hernia s/p ex-lap, reduction and parastomal hernia repair with Phasix mesh, LUANN secondary to obstructive uropathy    Vss  Afebrile  abd soft, non tender, non distended  hgb stable, 7 7 today  No leukocytosis wbc 7 5  Creatinine improving, 2 7 to 2 5 today      Plan:  Continue diet  Ambulate  Aggressive pt ot  Follow up case management recs  dispo planning    Subjective:   Feels well  Tolerating diet, denied any pain this morning  Denied fever, chills, chest pain, shortness of breath, nausea, vomiting, or abdominal pain this morning  Objective:     Vitals: Blood pressure 102/87, pulse 99, temperature 98 2 °F (36 8 °C), temperature source Oral, resp  rate 18, height 5' (1 524 m), weight 89 4 kg (197 lb 1 5 oz), SpO2 97 %, not currently breastfeeding  ,Body mass index is 38 49 kg/m²  Intake/Output Summary (Last 24 hours) at 8/18/2020 0601  Last data filed at 8/18/2020 0535  Gross per 24 hour   Intake 1407 5 ml   Output 1395 ml   Net 12 5 ml       Physical Exam: Physical Exam  General: NAD  HEENT: NC/AT  MMM  Cv: RRR     Lungs: normal effort  Ab: Soft, NT/ND  Ex: no CCE  Neuro: AAOx3    Scheduled Meds:  Current Facility-Administered Medications   Medication Dose Route Frequency Provider Last Rate    apixaban  2 5 mg Oral BID Gerry Paul MD      benzocaine  2 spray Mucosal Once Hi Rome MD      hydrALAZINE  10 mg Intravenous Q6H PRN Gerry Paul MD      HYDROmorphone  0 2 mg Intravenous Q3H PRN Sonia Harrison MD      HYDROmorphone  0 5 mg Intravenous Q3H PRN MD Rosa Maria Bakermorphone  0 5 mg Intravenous Q3H PRN Sonia Harrison MD      Labetalol HCl  10 mg Intravenous Q4H PRN Gerry Paul MD      metoprolol  5 mg Intravenous Fátima Zhang MD      ondansetron  4 mg Intravenous Q4H PRN Beryl Kelly MD      simethicone  80 mg Oral Q6H PRN VICOTRINA Morris       Continuous Infusions:   PRN Meds: hydrALAZINE    HYDROmorphone    HYDROmorphone    HYDROmorphone    Labetalol HCl    ondansetron    simethicone    Invasive Devices     Peripherally Inserted Central Catheter Line            PICC Line 08/14/20 Right Other (Comment) 3 days          Drain            Urostomy Ileal conduit RUQ 1413 days    Closed/Suction Drain Left LLQ Bulb 19 Fr  8 days                Lab, Imaging and other studies: none  VTE Pharmacologic Prophylaxis: apixaban  VTE Mechanical Prophylaxis: scd

## 2020-08-18 NOTE — PLAN OF CARE
Problem: PHYSICAL THERAPY ADULT  Goal: Performs mobility at highest level of function for planned discharge setting  See evaluation for individualized goals  Description: Treatment/Interventions: Functional transfer training, LE strengthening/ROM, Elevations, Therapeutic exercise, Endurance training, Patient/family training, Bed mobility, Gait training  Equipment Recommended: Dana Zuleta       See flowsheet documentation for full assessment, interventions and recommendations  Outcome: Progressing  Note: Prognosis: Good  Problem List: Decreased strength, Decreased endurance, Impaired balance, Decreased mobility, Decreased safety awareness, Pain  Assessment: The pt  has improving mobility today as she was able to ambulate down the [place] with less assistance  She had no loss of balance, but she did require increased time in order to complete  She has improving balance with transfers today as well  She does continue to remain limited, but she is ambulating household distances  She will benefit from continued therapy in order to maximize her functional mobility and independence  Barriers to Discharge: Inaccessible home environment     PT Discharge Recommendation: [address]     PT - OK to Discharge: Yes(to rehab once medically cleared)    See flowsheet documentation for full assessment

## 2020-08-18 NOTE — QUICK NOTE
Nurse-Patient-Provider rounds were completed with the patient's nurse today, Alana Wang  We discussed the plan is to   - heating pad to incision (erythematous area left lateral/distal aspect)  - local care to the JOSE ANTONIO site to include alginate due to mucusy drainage  - transition to PO analgesics  - restart home meds     We reviewed all of the invasive devices/lines/telemetry orders  DVT prophylaxis:  - Eliquis    Diet:  - surgical soft    Pain Assessment / Plan:  - Transition to PO meds    Mobility Assessment / Plan:  - Activity as tolerated  Goals / Barriers for discharge:  - rehab placement    Case management following; case and discharge needs discussed  All questions and concerns were addressed        Honorio Marx PA-C

## 2020-08-18 NOTE — QUICK NOTE
Reached out to Naima Ely PA-C with [facility] at 6505 to discuss redness surrounding the midline abdominal incision  Also, spoke with her regarding the site where she previously had a JOSE ANTONIO drain  Here, the site looked slightly reddened with some lenz drainage  Harriet Coffey came to bedside to evaluate patient and orders to be carried out by nursing to change dressings and apply heat to incision  Will continue to monitor patient for increasing pain or redness

## 2020-08-18 NOTE — OCCUPATIONAL THERAPY NOTE
OccupationalTherapy Progress Note     Patient Name: Wilfredo Sawant  [id number] Date: 8/18/2020  Problem List  Principal Problem:    Small bowel obstruction ([address] )            08/18/20 1124   Restrictions/Precautions   Weight Bearing Precautions Per Order No   Other Precautions Contact/isolation; Fall Risk;Pain   Lifestyle   Autonomy Pt reports being IND w/ all ADLS and IADLS (son occasionally A w/ chores); (+) drives PTA    Reciprocal Relationships Pt live w/ her son that has Autism and is mostly self-efficiant  Pt reports her other son is currently staying with her son to provide A as needed  Pt reports her son that has Autism is able to provide only limited asisst upon D/C  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   Intrinsic Gratification Pt reports enjoying being IND   Pain Assessment   Pain Assessment Tool 0-10   Pain Score 6   Pain Location/Orientation Location: Abdomen   Pain Onset/Description Descriptor: Discomfort   Patient's Stated Pain Goal No pain   Hospital Pain Intervention(s) Repositioned; Ambulation/increased activity; Emotional support   ADL   Where Assessed Chair   Grooming Assistance 5  Supervision/Setup   Grooming Deficit Setup;Supervision/safety   Grooming Comments washed face and combed hair   UB Bathing Assistance 4  Minimal Assistance   UB Bathing Deficit Setup;Supervision/safety;None  (back)   UB Bathing Comments able to wash BUE, chest, abdomen   LB Bathing Assistance 2  Maximal Assistance   LB Bathing Deficit Setup;Supervision/safety;Right lower leg including foot; Left lower leg including foot; Buttocks   LB Bathing Comments Pt able to wash thighs and kirt area  Unable to reach lower legs/feet, and buttock/back of thighs    UB Dressing Assistance 4  Minimal Assistance   UB Dressing Deficit Setup;Supervision/safety;Pull around back; Fasteners   UB Dressing Comments doff/don gown - able to thread arms    LB Dressing Assistance 2  Maximal Assistance   LB Dressing Deficit Setup;Supervision/safety; Don/doff R sock; Don/doff L sock   LB Dressing Comments pt unable to reach [address]  2  Maximal Assistance   Toileting Deficit Setup;Supervison/safety; Perineal hygiene;Clothing management up;Clothing management down   Toileting Comments Pt had bowel movement on standard toilet in bathroom - unable to perform toileting hygiene    Bed Mobility   Additional Comments Pt found OOB in chair upon OT arrival; Pt left OOB in chair with all needs in reach followng OT session   Transfers   Sit to Stand 4  Minimal assistance   Additional items Assist x 1; Armrests; Increased time required;Verbal cues   Stand to Sit 5  Supervision   Additional items Assist x 1; Armrests; Increased time required;Verbal cues   Toilet transfer 3  Moderate assistance   Additional items Assist x 1; Increased time required;Verbal cues;Standard toilet   Additional Comments Transfers w/ RW  VC/TC for hand placement and safety  Pt required more assist to stand from standard toilet  Functional Mobility   Functional Mobility 4  Minimal assistance  (Ax1)   Additional Comments Pt performed short distance household mobility in room from chair to bathroom  VC/TC for RW management and safety  Additional items Rolling walker   Coordination   Gross Motor notable difficulty performing gross motor tasks during self-care tasks d/t RUE edema    Cognition   Overall Cognitive Status WFL   Arousal/Participation Alert; Cooperative   Attention Within functional limits   Orientation Level Oriented X4   Memory Decreased recall of precautions   Following Commands Follows one step commands with increased time or repetition   Comments Pt appears lethargic/low energy today, but is agreeable to OT today  Pt with flat affect and appeared with low interest in therapy session  Pt requires increased time to follow through with commands and therapy tasks      Activity Tolerance   Activity Tolerance Patient limited by fatigue   Medical Staff Made Aware OT JOHNATHAN holland    Assessment   Assessment Pt participated in OT treatment session today to address barriers to occupational performance  Interventions focused on ADL retraining, functional transfers, functional mobility, and activity tolerance  Pt agreeable to OT treatment today  Upon OT arrival, Pt was found OOB in chair  Pt participated in toileting, UB/LB bathing, UB/LB dressing, grooming tasks, STS, toilet transfers, and short-distance household mobility - refer to chart for A levels  Pt requires encouragement and cueing throughout session  In comparison to last session, Pt is progressing with treatment goals but is limited by fatigue  Pt educated on safety awareness and energy conservation Pt demonstrated understanding  Pt will benefit from continued OT treatment t/o hospital stay to address limitations to occupational performance as defined above to maximize functional independence  Recommend post-acute rehab following d/c     Plan   Treatment Interventions ADL retraining;Functional transfer training;UE strengthening/ROM; Endurance training;Cognitive reorientation;Patient/family training;Equipment evaluation/education; Compensatory technique education;Continued evaluation; Energy conservation; Activityengagement   Goal Expiration Date 08/22/20   OT Treatment Day 1   OT Frequency 3-5x/wk   Recommendation   OT Discharge Recommendation Post-Acute Rehabilitation Services   OT - OK to Discharge Yes  (when medically cleared)   Modified [place] Scale   Modified Thien Scale [address] [facility]

## 2020-08-19 ENCOUNTER — TELEPHONE (OUTPATIENT)
Dept: NEPHROLOGY | Facility: CLINIC | Age: 75
End: 2020-08-19

## 2020-08-19 LAB
ANION GAP SERPL CALCULATED.3IONS-SCNC: 6 MMOL/L (ref 4–13)
BUN SERPL-MCNC: 27 MG/DL (ref 5–25)
CALCIUM SERPL-MCNC: 7.4 MG/DL (ref 8.3–10.1)
CHLORIDE SERPL-SCNC: 111 MMOL/L (ref 100–108)
CO2 SERPL-SCNC: 24 MMOL/L (ref 21–32)
CREAT SERPL-MCNC: 2.75 MG/DL (ref 0.6–1.3)
ERYTHROCYTE [DISTWIDTH] IN BLOOD BY AUTOMATED COUNT: 14 % (ref 11.6–15.1)
GFR SERPL CREATININE-BSD FRML MDRD: 16 ML/MIN/1.73SQ M
GLUCOSE SERPL-MCNC: 104 MG/DL (ref 65–140)
HCT VFR BLD AUTO: 25.4 % (ref 34.8–46.1)
HGB BLD-MCNC: 7.6 G/DL (ref 11.5–15.4)
MCH RBC QN AUTO: 29.9 PG (ref 26.8–34.3)
MCHC RBC AUTO-ENTMCNC: 29.9 G/DL (ref 31.4–37.4)
MCV RBC AUTO: 100 FL (ref 82–98)
PLATELET # BLD AUTO: 194 THOUSANDS/UL (ref 149–390)
PMV BLD AUTO: 10.3 FL (ref 8.9–12.7)
POTASSIUM SERPL-SCNC: 4.4 MMOL/L (ref 3.5–5.3)
RBC # BLD AUTO: 2.54 MILLION/UL (ref 3.81–5.12)
SODIUM SERPL-SCNC: 141 MMOL/L (ref 136–145)
WBC # BLD AUTO: 8.13 THOUSAND/UL (ref 4.31–10.16)

## 2020-08-19 PROCEDURE — 99024 POSTOP FOLLOW-UP VISIT: CPT | Performed by: SURGERY

## 2020-08-19 PROCEDURE — 97530 THERAPEUTIC ACTIVITIES: CPT

## 2020-08-19 PROCEDURE — 97116 GAIT TRAINING THERAPY: CPT

## 2020-08-19 PROCEDURE — 80048 BASIC METABOLIC PNL TOTAL CA: CPT | Performed by: SURGERY

## 2020-08-19 PROCEDURE — 99232 SBSQ HOSP IP/OBS MODERATE 35: CPT | Performed by: INTERNAL MEDICINE

## 2020-08-19 PROCEDURE — 85027 COMPLETE CBC AUTOMATED: CPT | Performed by: SURGERY

## 2020-08-19 RX ORDER — CEPHALEXIN 500 MG/1
500 CAPSULE ORAL EVERY 8 HOURS SCHEDULED
Status: COMPLETED | OUTPATIENT
Start: 2020-08-19 | End: 2020-08-24

## 2020-08-19 RX ADMIN — METOPROLOL TARTRATE 25 MG: 25 TABLET, FILM COATED ORAL at 21:36

## 2020-08-19 RX ADMIN — APIXABAN 2.5 MG: 2.5 TABLET, FILM COATED ORAL at 08:17

## 2020-08-19 RX ADMIN — SIMETHICONE 80 MG: 80 TABLET, CHEWABLE ORAL at 11:37

## 2020-08-19 RX ADMIN — APIXABAN 2.5 MG: 2.5 TABLET, FILM COATED ORAL at 17:19

## 2020-08-19 RX ADMIN — OXYCODONE HYDROCHLORIDE 2.5 MG: 5 TABLET ORAL at 11:37

## 2020-08-19 RX ADMIN — CEPHALEXIN 500 MG: 500 CAPSULE ORAL at 21:36

## 2020-08-19 RX ADMIN — METOPROLOL TARTRATE 25 MG: 25 TABLET, FILM COATED ORAL at 08:17

## 2020-08-19 RX ADMIN — LEVOTHYROXINE SODIUM 75 MCG: 75 TABLET ORAL at 05:07

## 2020-08-19 RX ADMIN — Medication 125 MG: at 21:36

## 2020-08-19 RX ADMIN — MELATONIN 3 MG: at 21:36

## 2020-08-19 RX ADMIN — CEPHALEXIN 500 MG: 500 CAPSULE ORAL at 17:19

## 2020-08-19 RX ADMIN — OXYCODONE HYDROCHLORIDE 2.5 MG: 5 TABLET ORAL at 19:16

## 2020-08-19 RX ADMIN — CITALOPRAM HYDROBROMIDE 20 MG: 20 TABLET ORAL at 08:17

## 2020-08-19 NOTE — SOCIAL WORK
Call received from son, Yen Lebron with sn[facility] choices of [place], [place]  [facility] referral were made

## 2020-08-19 NOTE — PLAN OF CARE
Problem: PHYSICAL THERAPY ADULT  Goal: Performs mobility at highest level of function for planned discharge setting  See evaluation for individualized goals  Description: Treatment/Interventions: Functional transfer training, LE strengthening/ROM, Elevations, Therapeutic exercise, Endurance training, Cognitive reorientation, Patient/family training, Equipment eval/education, Bed mobility, Gait training, Compensatory technique education, Spoke to nursing, Spoke to case management  Equipment Recommended: Nate Eaton       See flowsheet documentation for full assessment, interventions and recommendations  Outcome: Progressing  Note: Prognosis: Good  Problem List: Decreased strength, Decreased range of motion, Decreased endurance, Impaired balance, Decreased mobility, Decreased safety awareness, Impaired judgement, Obesity  Assessment: Pt continues to be significnatly deconditioned and requiring max encouragment and motivation for participate   in comparison to prior sessions pt was able to complete further gait distnaces w/ 2 trials today- toelrated fair  Pt continues to be limited by ongoing abdominal discomfort  and severe deconditioning  PT recommeding rehab on d/c   Barriers to Discharge: Inaccessible home environment     PT Discharge Recommendation: [address]     PT - OK to Discharge: Yes(to rehab )    See flowsheet documentation for full assessment

## 2020-08-19 NOTE — SOCIAL WORK
Call placed to [facility], [phone] and spoke to [place], [facility]  Clinical liaison, Mikal Khan,  will review referral for insurance and clinical approval    Denny Sim, [phone], and they do not participate with patient's insurance  [address] TCU/snf admissions [phone] and spoke to Leslie  The TCU/snf does not participate with patient's insurance but acute rehab unit does  They will review for acute rehab  Met with patient to review above  Called son and left message to call CM

## 2020-08-19 NOTE — RESTORATIVE TECHNICIAN NOTE
Restorative Specialist Mobility Note       Activity: Ambulate in [place]     Assistive Device: Front wheel walker        Repositioned: Sitting, Up in chair

## 2020-08-19 NOTE — QUICK NOTE
Nurse-Patient-Provider rounds were completed with the patient's nurse today, Mayra  We discussed the plan is to   - heat to left abdomen (not shingles region)  - local care to JOSE ANTONIO site  - placement pending    DVT prophylaxis:  - Eliquis    Diet:  - Regular    Pain Assessment / Plan:  - Continue current pain management regimen    Mobility Assessment / Plan:  - Activity as tolerated  Goals / Barriers for discharge:  - placement arrangements per CM    Case management following; case and discharge needs discussed  All questions and concerns were addressed        Emmanuel Arellano PA-C

## 2020-08-19 NOTE — SOCIAL WORK
Called [facility] and spoke to [place]  She reports clinical liaison is reviewing the referral and she will ask Chris Larson to call CM  Called [facility] TCU and left message for Gaurang Hernandez to call back  Received call from [place] at [facility]  She spoke to her clinical nurse liaison, Chris Larson, and they did not receive referral which was sent via [facility] [address]  Faxed clinical records to Chris Larson at fax# [phone]  They will review and call CM tomorrow morning

## 2020-08-19 NOTE — RESTORATIVE TECHNICIAN NOTE
Restorative Specialist Mobility Note       Activity: Ambulate in room, Bathroom privileges     Assistive Device: Front wheel walker        Repositioned: Sitting, Up in chair

## 2020-08-19 NOTE — PROGRESS NOTES
visited with patient and provided a blessing    Patient received Sacrament of the Sick/Anointing 8/17/20 08/19/20 1300   Clinical Encounter Type   Visited With Patient   Restorationism Encounters   Restorationism Needs Prayer   Sacramental Encounters   Sacrament of Sick-Anointing Anointed

## 2020-08-19 NOTE — PLAN OF CARE
Problem: Potential for Falls  Goal: Patient will remain free of falls  Description: INTERVENTIONS:  - Assess patient frequently for physical needs  -  Identify cognitive and physical deficits and behaviors that affect risk of falls    -  [place] fall precautions as indicated by assessment   - Educate patient/family on patient safety including physical limitations  - Instruct patient to call for assistance with activity based on assessment  - Modify environment to reduce risk of injury  - Consider OT/PT consult to assist with strengthening/mobility  Outcome: Progressing     Problem: Prexisting or High Potential for Compromised Skin Integrity  Goal: Skin integrity is maintained or improved  Description: INTERVENTIONS:  - Identify patients at risk for skin breakdown  - Assess and monitor skin integrity  - Assess and monitor nutrition and hydration status  - Monitor labs   - Assess for incontinence   - Turn and reposition patient  - Assist with mobility/ambulation  - Relieve pressure over bony prominences  - Avoid friction and shearing  - Provide appropriate hygiene as needed including keeping skin clean and dry  - Evaluate need for skin moisturizer/barrier cream  - Collaborate with interdisciplinary team   - Patient/family teaching  - Consider wound care consult   Outcome: Progressing     Problem: CARDIOVASCULAR - ADULT  Goal: Maintains optimal cardiac output and hemodynamic stability  Description: INTERVENTIONS:  - Monitor I/O, vital signs and rhythm  - Monitor for S/S and trends of decreased cardiac output  - Administer and titrate ordered vasoactive medications to optimize hemodynamic stability  - Assess quality of pulses, skin color and temperature  - Assess for signs of decreased coronary artery perfusion  - Instruct patient to report change in severity of symptoms  Outcome: Progressing  Goal: Absence of cardiac dysrhythmias or at baseline rhythm  Description: INTERVENTIONS:  - Continuous cardiac monitoring, vital signs, obtain 12 lead EKG if ordered  - Administer antiarrhythmic and heart rate control medications as ordered  - Monitor electrolytes and administer replacement therapy as ordered  Outcome: Progressing     Problem: RESPIRATORY - ADULT  Goal: Achieves optimal ventilation and oxygenation  Description: INTERVENTIONS:  - Assess for changes in respiratory status  - Assess for changes in mentation and behavior  - Position to facilitate oxygenation and minimize respiratory effort  - Oxygen administered by appropriate delivery if ordered  - Initiate smoking cessation education as indicated  - Encourage broncho-pulmonary hygiene including cough, deep breathe, Incentive Spirometry  - Assess the need for suctioning and aspirate as needed  - Assess and instruct to report SOB or any respiratory difficulty  - Respiratory Therapy support as indicated  Outcome: Progressing     Problem: GASTROINTESTINAL - ADULT  Goal: Minimal or absence of nausea and/or vomiting  Description: INTERVENTIONS:  - Administer IV fluids if ordered to ensure adequate hydration  - Maintain NPO status until nausea and vomiting are resolved  - Nasogastric tube if ordered  - Administer ordered antiemetic medications as needed  - Provide nonpharmacologic comfort measures as appropriate  - Advance diet as tolerated, if ordered  - Consider nutrition services referral to assist patient with adequate nutrition and appropriate food choices  Outcome: Progressing  Goal: Maintains or returns to baseline bowel function  Description: INTERVENTIONS:  - Assess bowel function  - Encourage oral fluids to ensure adequate hydration  - Administer IV fluids if ordered to ensure adequate hydration  - Administer ordered medications as needed  - Encourage mobilization and activity  - Consider nutritional services referral to assist patient with adequate nutrition and appropriate food choices  Outcome: Progressing  Goal: Maintains adequate nutritional intake  Description: INTERVENTIONS:  - Monitor percentage of each meal consumed  - Identify factors contributing to decreased intake, treat as appropriate  - Assist with meals as needed  - Monitor I&O, weight, and lab values if indicated  - Obtain nutrition services referral as needed  Outcome: Progressing  Goal: Establish and maintain optimal ostomy function  Description: INTERVENTIONS:  - Assess bowel function  - Encourage oral fluids to ensure adequate hydration  - Administer IV fluids if ordered to ensure adequate hydration   - Administer ordered medications as needed  - Encourage mobilization and activity  - Nutrition services referral to assist patient with appropriate food choices  - Assess stoma site  - Consider wound care consult   Outcome: Progressing     Problem: METABOLIC, FLUID AND ELECTROLYTES - ADULT  Goal: Electrolytes maintained within normal limits  Description: INTERVENTIONS:  - Monitor labs and assess patient for signs and symptoms of electrolyte imbalances  - Administer electrolyte replacement as ordered  - Monitor response to electrolyte replacements, including repeat lab results as appropriate  - Instruct patient on fluid and nutrition as appropriate  Outcome: Progressing  Goal: Fluid balance maintained  Description: INTERVENTIONS:  - Monitor labs   - Monitor I/O and WT  - Instruct patient on fluid and nutrition as appropriate  - Assess for signs & symptoms of volume excess or deficit  Outcome: Progressing  Goal: Glucose maintained within target range  Description: INTERVENTIONS:  - Monitor Blood Glucose as ordered  - Assess for signs and symptoms of hyperglycemia and hypoglycemia  - Administer ordered medications to maintain glucose within target range  - Assess nutritional intake and initiate nutrition service referral as needed  Outcome: Progressing     Problem: Nutrition/Hydration-ADULT  Goal: Nutrient/Hydration intake appropriate for improving, restoring or maintaining nutritional needs  Description: Monitor and assess patient's nutrition/hydration status for malnutrition  Collaborate with interdisciplinary team and initiate plan and interventions as ordered  Monitor patient's weight and dietary intake as ordered or per policy  Utilize nutrition screening tool and intervene as necessary  Determine patient's food preferences and provide high-protein, high-caloric foods as appropriate       INTERVENTIONS:  - Monitor oral intake, urinary output, labs, and treatment plans  - Assess nutrition and hydration status and recommend course of action  - Evaluate amount of meals eaten  - Assist patient with eating if necessary   - Allow adequate time for meals  - Recommend/ encourage appropriate diets, oral nutritional supplements, and vitamin/mineral supplements  - Order, calculate, and assess calorie counts as needed  - Recommend, monitor, and adjust tube feedings and TPN/PPN based on assessed needs  - Assess need for intravenous fluids  - Provide specific nutrition/hydration education as appropriate  - Include patient/family/caregiver in decisions related to nutrition  Outcome: Progressing

## 2020-08-19 NOTE — PHYSICAL THERAPY NOTE
Physical Therapy Treatment     Patient Name: Andrea oKng    [id number] Date: 8/19/2020     Problem List  Principal Problem:    Small bowel obstruction [facility])       Past Medical History  Past Medical History:   Diagnosis Date    Anxiety     Chronic kidney disease (CKD), stage IV (severe) ([facility] [address] )     stage IV    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    Compression fracture of cervical spine ([facility] [address] )     Hydronephrosis     Hypertension     Hypothyroid     Incontinence     Lung mass     Improving on PET/CT 1/2016    Polycythemia vera ([facility] [address] )     [facility]    Urinary tract infection         Past Surgical History  Past Surgical History:   Procedure Laterality Date    ABDOMINAL ADHESION SURGERY N/A 8/9/2020    Procedure: LYSIS ADHESIONS;  Surgeon: Diamond Bennett DO;  [facility];  Service: General    BLADDER SUSPENSION      BOTOX INJECTION N/A 7/27/2016    Procedure: BOTOX INJECTION ;  Surgeon: Alvaro Gonzalez MD;  [facility];  Service:     CHOLECYSTECTOMY N/A     COLONOSCOPY      CYSTECTOMY, RADICAL WITH ILEOCONDUIT N/A 10/4/2016    Procedure: SUPRATRIGONAL CYSTECTOMY WITH ILEAL CONDUIT ;  Surgeon: Alvaro Gonzalez MD;  [facility];  Service:    Mazin Stacks W/ RETROGRADES Bilateral 7/27/2016    Procedure: Caitlin Holliday; RETROGRADE PYELOGRAM ;  Surgeon: Alvaro Gonzalez MD;  [facility];  Service:     IR CENTRAL LINE PLACEMENT  7/17/2020    IR CENTRAL LINE PLACEMENT  8/14/2020    LAPAROTOMY N/A 8/9/2020    Procedure: LAPAROTOMY EXPLORATORY, PARASTOMAL HERNIA REPAIR WITH MESH;  Surgeon: Diamond Bennett DO;  [facility];  Service: General    MN COLONOSCOPY FLX DX W/COLLJ SPEC WHEN PFRMD N/A 8/31/2016    Procedure: COLONOSCOPY;  Surgeon: Lawrence Blum MD;  Location: [facility];   Service: Gastroenterology    MN CYSTOSCOPY,INSERT URETERAL STENT Bilateral 7/27/2016 Procedure: STENT INSERTION; EXCISION OF MESH ;  Surgeon: Husam Kruse MD;  [facility];  Service: Urology    TONSILLECTOMY      TUBAL LIGATION      WISDOM TOOTH EXTRACTION          08/19/20 1935   Pain Assessment   Pain Assessment Tool 0-10   Pain Score 6   Pain Location/Orientation Orientation: Right;Location: Abdomen   Pain Onset/Description Descriptor: Discomfort   Effect of Pain on Daily Activities limited mobility    Restrictions/Precautions   Weight Bearing Precautions Per Order No   Other Precautions Contact/isolation; Fall Risk;Pain   General   Chart Reviewed Yes   Response to Previous Treatment Patient with no complaints from previous session  Family/Caregiver Present No   Cognition   Overall Cognitive Status WFL   Arousal/Participation Alert; Cooperative   Attention Within functional limits   Orientation Level Oriented X4   Memory Decreased recall of precautions   Following Commands Follows one step commands without difficulty   Comments flat affect- low motivation to participate- required max encouragement and time for participation in minimal activity    Subjective   Subjective "I guess I'll try"    Bed Mobility   Sit to Supine 3  Moderate assistance   Additional items Assist x 2  (for LE and trunk management onto flat bed)   Transfers   Sit to Stand 4  Minimal assistance  (CGA)   Additional items Assist x 1; Increased time required;Verbal cues   Stand to Sit 5  Supervision   Additional items Assist x 1; Increased time required;Verbal cues   Stand pivot 4  Minimal assistance   Ambulation/Elevation   Gait pattern Excessively slow; Step to   Gait Assistance 4  Minimal assist  (CGA for turning and transitions for balance )   Additional items Assist x 1;Verbal cues; Tactile cues   Assistive Device Rolling walker   Distance 80'x2 w/ seated rest b/t trials- chair follow-    Balance   Static Sitting Good   Dynamic Sitting Fair +   Static Standing Fair   Dynamic Standing Fair -   Ambulatory Poor +  (rw)   Endurance Deficit   Endurance Deficit Yes   Endurance Deficit Description fatigue weakness; Activity Tolerance   Activity Tolerance Patient tolerated treatment well   Medical Staff Made Aware RT Shalini Baker) RN    Nurse Made Aware yes- cleared for pt tx session    Exercises   Heelslides Sitting;10 reps;AAROM;AROM   Hip Abduction Supine;10 reps   Hip Adduction Supine;10 reps;Right;Left   Knee AROM Long Arc Quad 10 reps;Right;Left;Sitting   Assessment   Prognosis Good   Problem List Decreased strength;Decreased range of motion;Decreased endurance; Impaired balance;Decreased mobility; Decreased safety awareness; Impaired judgement;Obesity   Assessment Pt continues to be significnatly deconditioned and requiring max encouragment and motivation for participate   in comparison to prior sessions pt was able to complete further gait distnaces w/ 2 trials today- toelrated fair  Pt continues to be limited by ongoing abdominal discomfort  and severe deconditioning  PT recommeding rehab on d/c    Barriers to Discharge Inaccessible home environment   Goals   Patient Goals to rest    STG Expiration Date 08/26/20   PT Treatment Day 3   Plan   Treatment/Interventions Functional transfer training;LE strengthening/ROM; Elevations; Therapeutic exercise; Endurance training;Cognitive reorientation;Patient/family training;Equipment eval/education; Bed mobility;Gait training; Compensatory technique education;Spoke to nursing;Spoke to case management   Progress Progressing toward goals   Recommendation   PT Discharge Recommendation Post-Acute Rehabilitation Services   Equipment Recommended Walker   PT - OK to Discharge Yes  (to rehab )   Additional Comments supine in bed w/ all needs in reach post session         Dimas Cedeño, PT

## 2020-08-19 NOTE — PROGRESS NOTES
Progress Note - Gracie Esquivel 77 y o  female MRN: [id number]    Unit/Bed#: [facility] 938-08 Encounter: [id number]      Assessment:  Zully duenas 77 y  o  female w/ incarcerated parastomal hernia s/p ex-lap, reduction and parastomal hernia repair with Phasix mesh, LUANN secondary to obstructive uropathy    Vss  Afebrile  abd soft/ nontender/ non distended  Ileal conduit in place  Plan:  Continue diet  Ambulate  Pt/ot  F/u case management referrals  dispo planning    Subjective:   Feels well  No complaints  Tolerating diet  No chest pain or shortness of breath  Objective:     Vitals: Blood pressure 153/92, pulse 96, temperature 98 8 °F (37 1 °C), temperature source Oral, resp  rate 18, height 5' (1 524 m), weight 89 4 kg (197 lb 1 5 oz), SpO2 95 %, not currently breastfeeding  ,Body mass index is 38 49 kg/m²  Intake/Output Summary (Last 24 hours) at 8/19/2020 0618  Last data filed at 8/19/2020 0516  Gross per 24 hour   Intake 468 ml   Output 1875 ml   Net -1407 ml       Physical Exam  General: NAD  HEENT: NC/AT  MMM  Cv: RRR     Lungs: normal effort  Ab: Soft, NT/ND  Ex: no CCE  Neuro: AAOx3    Scheduled Meds:  Current Facility-Administered Medications   Medication Dose Route Frequency Provider Last Rate    apixaban  2 5 mg Oral BID Rangel Fitzpatrick MD      benzocaine  2 spray Mucosal Once Antoine Arias MD      citalopram  20 mg Oral Daily April John PA-C      hydrALAZINE  10 mg Intravenous Q6H PRN Rangel Fitzpatrick MD      HYDROmorphone  0 5 mg Intravenous Q3H PRN Conrad Mitchell MD      Labetalol HCl  10 mg Intravenous Q4H PRN Rangel Fitzpatrick MD      levothyroxine  75 mcg Oral Early Morning April John PA-C      melatonin  3 mg Oral HS April John PA-C      metoprolol tartrate  25 mg Oral Q12H [address] [place] Spencer, DO      ondansetron  4 mg Intravenous Q4H PRN Conrad Mitchell MD      oxyCODONE  2 5 mg Oral Q4H PRN April Alvaro, PA-C      oxyCODONE  5 mg Oral Q4H PRN DIMITRI Mayer Pert simethicone  80 mg Oral Q6H PRN VICTORINA Ferguson       Continuous Infusions:   PRN Meds: hydrALAZINE    HYDROmorphone    Labetalol HCl    ondansetron    oxyCODONE    oxyCODONE    simethicone      Invasive Devices     Peripherally Inserted Central Catheter Line            PICC Line 08/14/20 Right Other (Comment) 4 days          Drain            Urostomy Ileal conduit RUQ 1414 days    Closed/Suction Drain Left LLQ Bulb 19 Fr  9 days                Lab, Imaging and other studies: I have personally reviewed pertinent reports      VTE Pharmacologic Prophylaxis: Heparin  VTE Mechanical Prophylaxis: sequential compression device

## 2020-08-19 NOTE — TELEPHONE ENCOUNTER
Patient is scheduled in Sept with Dr Jas Lizama     ----- Message from Lee Ann Cao DO sent at 8/19/2020 12:44 PM EDT -----  Regarding: Follow up  Patient presented with incarcerated parastomal hernia with repair  Creatinine is stable at 2 75  awaiting placement  Please confirm patient has f/u appointment in SeptemberThanks  1  Acute Kidney Injury secondary to hemodynamic fluctuations as well as obstructive uropathy given hydronephrosis  -Urine:  Red, turbid large blood positive nitrite large leukocytes from July 14  -Renal US:  Multiple calculi at the inferior pole of the right kidney bilateral hydroureteronephrosis to the level of the ileal conduit  -Plan:  Is making some urine and creatinine is back to baseline tolerating p o  Intake will discontinue intravenous fluid oral mucosa is moist monitor urine output  2  Electrolytes:  - Currently stable  3  Acid/Base:  - No anion gap and bicarb acceptable  4  Hypertension  - Currently just on metoprolol as needed avoid hypotension  - Blood pressure is slightly lower will place hold parameters on the metoprolol of 130 does not appear to have a history of atrial fibrillation  5  Volume Status  - Now with positive fluid balance tolerating p o  Intake will discontinue IV fluid  6  Anemia of chronic kidney disease  - Hemoglobin is trending downward  7  MBD  - Phosphorus slightly low, encourage p o  Intake and will monitor  8   Clinical Course/Health Maintanance/Risk Reduction  - With incarcerated parastomal hernia status post ex lap with reduction and parastomal hernia repair with mesh

## 2020-08-19 NOTE — PROGRESS NOTES
NEPHROLOGY PROGRESS NOTE   Wilfredo Sawant 77 y o  female MRN: [id number]  Unit/Bed#: [facility] 507-01 Encounter: [id number]      ASSESSMENT & PLAN    1  Acute Kidney Injury secondary to hemodynamic fluctuations as well as obstructive uropathy given hydronephrosis  ? Urine:  Red, turbid large blood positive nitrite large leukocytes from July 14  ? Renal US:  Multiple calculi at the inferior pole of the right kidney bilateral hydroureteronephrosis to the level of the ileal conduit  ? Plan:  Is making some urine and creatinine is back to baseline tolerating p o  Intake will discontinue intravenous fluid oral mucosa is moist monitor urine output  ? No active interventions by Nephrology  Will see as needed  ? Has appointment with Dr Allie Rodríguez in September  Can follow up at that time  2  Electrolytes:  ? Currently stable  3  Acid/Base:  ? No anion gap and bicarb acceptable  4  Hypertension  ? Currently just on metoprolol as needed avoid hypotension  ? Blood pressure is slightly lower will place hold parameters on the metoprolol of 130 does not appear to have a history of atrial fibrillation  5  Volume Status  ? Now with positive fluid balance tolerating p o  Intake monitor off fluids  6  Anemia of chronic kidney disease  ? Hemoglobin stable around 7 6, per ICU team  7  MBD  ? Phosphorus slightly low, encourage p o  Intake and monitor  8  Clinical Course/Health Maintanance/Risk Reduction  ? With incarcerated parastomal hernia status post ex lap with reduction and parastomal hernia repair with mesh  ? Needs rehab  Ongoing Dispo Planning    No active issues being managed by Nephrology  Has appointment with Dr Allie Rodríguez in September  Can keep this appointment    Will see as needed while in hospital   Please call with any questions or concerncs    SUBJECTIVE:    Patient was seen still with pain  Urine output stable  BP acceptable  Tolerating po intake well    OBJECTIVE:  Current Weight: Weight - Scale: 89 4 kg (197 lb 1 5 oz)  Vitals:    08/19/20 0800   BP: 137/100   Pulse: 96   Resp:    Temp:    SpO2:        Intake/Output Summary (Last 24 hours) at 8/19/2020 1239  Last data filed at 8/19/2020 0801  Gross per 24 hour   Intake 232 ml   Output 1750 ml   Net -1518 ml     Weight (last 2 days)     None          General: conscious, cooperative, in not acute distress  Eyes: conjunctivae pink, anicteric sclerae  ENT: lips and mucous membranes moist  Neck: supple, no JVD  Chest: clear breath sounds bilateral, no crackles, ronchus or wheezings  CVS: distinct S1 & S2, normal rate, regular rhythm  Abdomen: soft, non-tender, non-distended, normoactive bowel sounds, ileostomy bag  Extremities: no edema of both legs  Skin: no rash  Neuro: awake, alert, oriented      Medications:    Current Facility-Administered Medications:     apixaban (ELIQUIS) tablet 2 5 mg, 2 5 mg, Oral, BID, Alexandre Alexander MD, 2 5 mg at 08/19/20 0817    benzocaine (HURRICAINE) 20 % mucosal spray 2 spray, 2 spray, Mucosal, Once, Nimo Mckeon MD, Stopped at 08/12/20 1544    citalopram (CeleXA) tablet 20 mg, 20 mg, Oral, Daily, Marge Temple PA-C, 20 mg at 08/19/20 0817    hydrALAZINE (APRESOLINE) injection 10 mg, 10 mg, Intravenous, Q6H PRN, Alexandre Alexander MD, 10 mg at 08/16/20 2200    HYDROmorphone (DILAUDID) injection 0 5 mg, 0 5 mg, Intravenous, Q3H PRN, Wilma Thomas MD, 0 5 mg at 08/16/20 1510    Labetalol HCl (NORMODYNE) injection 10 mg, 10 mg, Intravenous, Q4H PRN, Alexandre Alexander MD    levothyroxine tablet 75 mcg, 75 mcg, Oral, Early Morning, Marge Temple PA-C, 75 mcg at 08/19/20 0507    melatonin tablet 3 mg, 3 mg, Oral, HS, Marge Temple PA-C, 3 mg at 08/18/20 2110    metoprolol tartrate (LOPRESSOR) tablet 25 mg, 25 mg, Oral, Q12H [address], Dallasnidia Spencer DO, 25 mg at 08/19/20 0817    ondansetron (ZOFRAN) injection 4 mg, 4 mg, Intravenous, Q4H PRN, Wilma Thomas MD, 4 mg at 08/17/20 1429    oxyCODONE (ROXICODONE) IR tablet 2 5 mg, 2 5 mg, Oral, Q4H PRN, Kirk Diaz DIMITRI Temple, 2 5 mg at 08/19/20 1137    oxyCODONE (ROXICODONE) IR tablet 5 mg, 5 mg, Oral, Q4H PRN, Mark Church PA-C    [facility]) chewable tablet 80 mg, 80 mg, Oral, Q6H PRN, [address]AR[address] LarsenNP, 80 mg at 08/19/20 1137    Invasive Devices:      Lab Results:   Results from last 7 days   Lab Units 08/19/20  0507 08/18/20  0532 08/17/20  0445 08/17/20  0444 08/16/20  0447 08/15/20  0505 08/14/20  0448   WBC Thousand/uL 8 13 7 50 5 56  --  5 37 4 70 5 38   HEMOGLOBIN g/dL 7 6* 7 7* 7 2*  --  7 7* 7 8* 8 3*   HEMATOCRIT % 25 4* 25 1* 23 7*  --  25 8* 25 9* 27 0*   PLATELETS Thousands/uL 194 187 174  --  209 200 210   POTASSIUM mmol/L 4 4 3 8  --  4 1 3 2* 3 4* 4 0   CHLORIDE mmol/L 111* 110*  --  109* 107 106 103   CO2 mmol/L 24 25  --  27 30 32 33*   BUN mg/dL 27* 31*  --  31* 35* 43* 46*   CREATININE mg/dL 2 75* 2 75*  --  2 54* 2 70* 3 16* 3 64*   CALCIUM mg/dL 7 4* 7 7*  --  7 6* 7 8* 7 9* 8 5   MAGNESIUM mg/dL  --   --   --   --   --   --  2 4   PHOSPHORUS mg/dL  --   --   --  2 0*  --   --  4 0       Previous work up: please see previous notes

## 2020-08-20 LAB
ANION GAP SERPL CALCULATED.3IONS-SCNC: 6 MMOL/L (ref 4–13)
BUN SERPL-MCNC: 25 MG/DL (ref 5–25)
CALCIUM SERPL-MCNC: 8.1 MG/DL (ref 8.3–10.1)
CHLORIDE SERPL-SCNC: 108 MMOL/L (ref 100–108)
CO2 SERPL-SCNC: 24 MMOL/L (ref 21–32)
CREAT SERPL-MCNC: 2.79 MG/DL (ref 0.6–1.3)
ERYTHROCYTE [DISTWIDTH] IN BLOOD BY AUTOMATED COUNT: 14.1 % (ref 11.6–15.1)
GFR SERPL CREATININE-BSD FRML MDRD: 16 ML/MIN/1.73SQ M
GLUCOSE SERPL-MCNC: 112 MG/DL (ref 65–140)
HCT VFR BLD AUTO: 26.1 % (ref 34.8–46.1)
HGB BLD-MCNC: 8 G/DL (ref 11.5–15.4)
MCH RBC QN AUTO: 29.7 PG (ref 26.8–34.3)
MCHC RBC AUTO-ENTMCNC: 30.7 G/DL (ref 31.4–37.4)
MCV RBC AUTO: 97 FL (ref 82–98)
PLATELET # BLD AUTO: 203 THOUSANDS/UL (ref 149–390)
PMV BLD AUTO: 10.3 FL (ref 8.9–12.7)
POTASSIUM SERPL-SCNC: 4 MMOL/L (ref 3.5–5.3)
RBC # BLD AUTO: 2.69 MILLION/UL (ref 3.81–5.12)
SODIUM SERPL-SCNC: 138 MMOL/L (ref 136–145)
WBC # BLD AUTO: 9.11 THOUSAND/UL (ref 4.31–10.16)

## 2020-08-20 PROCEDURE — 99024 POSTOP FOLLOW-UP VISIT: CPT | Performed by: SURGERY

## 2020-08-20 PROCEDURE — 80048 BASIC METABOLIC PNL TOTAL CA: CPT | Performed by: SURGERY

## 2020-08-20 PROCEDURE — 85027 COMPLETE CBC AUTOMATED: CPT | Performed by: SURGERY

## 2020-08-20 PROCEDURE — 97116 GAIT TRAINING THERAPY: CPT

## 2020-08-20 PROCEDURE — 97535 SELF CARE MNGMENT TRAINING: CPT

## 2020-08-20 RX ADMIN — APIXABAN 2.5 MG: 2.5 TABLET, FILM COATED ORAL at 09:24

## 2020-08-20 RX ADMIN — CITALOPRAM HYDROBROMIDE 20 MG: 20 TABLET ORAL at 09:24

## 2020-08-20 RX ADMIN — CEPHALEXIN 500 MG: 500 CAPSULE ORAL at 05:25

## 2020-08-20 RX ADMIN — Medication 125 MG: at 09:27

## 2020-08-20 RX ADMIN — Medication 125 MG: at 21:45

## 2020-08-20 RX ADMIN — APIXABAN 2.5 MG: 2.5 TABLET, FILM COATED ORAL at 17:41

## 2020-08-20 RX ADMIN — LEVOTHYROXINE SODIUM 75 MCG: 75 TABLET ORAL at 05:25

## 2020-08-20 RX ADMIN — METOPROLOL TARTRATE 25 MG: 25 TABLET, FILM COATED ORAL at 09:25

## 2020-08-20 RX ADMIN — HYDROMORPHONE HYDROCHLORIDE 0.5 MG: 1 INJECTION, SOLUTION INTRAMUSCULAR; INTRAVENOUS; SUBCUTANEOUS at 11:15

## 2020-08-20 RX ADMIN — CEPHALEXIN 500 MG: 500 CAPSULE ORAL at 21:48

## 2020-08-20 RX ADMIN — CEPHALEXIN 500 MG: 500 CAPSULE ORAL at 15:22

## 2020-08-20 RX ADMIN — MELATONIN 3 MG: at 21:47

## 2020-08-20 NOTE — RESTORATIVE TECHNICIAN NOTE
Restorative Specialist Mobility Note       Activity: Ambulate in [place], Chair, Bathroom privileges     Assistive Device: Front wheel walker        Repositioned: Sitting, Up in chair

## 2020-08-20 NOTE — SOCIAL WORK
Informed Marge, Surgery PAC that we are working on referrals to snf rehabs in area near son's home  [address] and left message for [place], admissions, to call CM today  [address] and left message requesting admission call CM with status of referral      Called son, Goldie Lemus, and explained no decision on snf  Asked if he would review list for additional choices and he will  Son also asking if patient can return home and reviewed with him most recent therapy notes  Will meet with patient and son around 3:30 today

## 2020-08-20 NOTE — PLAN OF CARE
Problem: OCCUPATIONAL THERAPY ADULT  Goal: Performs self-care activities at highest level of function for planned discharge setting  See evaluation for individualized goals  Description: Treatment Interventions: ADL retraining, Functional transfer training, UE strengthening/ROM, Endurance training, Cognitive reorientation, Patient/family training, Equipment evaluation/education, Compensatory technique education, Activityengagement, Energy conservation          See flowsheet documentation for full assessment, interventions and recommendations  8/20/2020 1502 by Teresa Moralez  Outcome: Progressing  Note: Limitation: Decreased ADL status, Decreased UE strength, Decreased Safe judgement during ADL, Decreased cognition, Decreased endurance, Decreased high-level ADLs  Prognosis: Fair  Assessment: Pt participated in OT treatment session today to address barriers to occupational performance  Interventions focused on ADL retraining, functional transfers, and functional mobility  Pt agreeable to OT treatment today  Upon OT arrival, Pt was found OOB in chair  Pt participated in toileting, LB dressing, long distance functional mobility, toilet transfer, and STS transfers - refer to chart for A levels  Pt requires curing and encouragement throughout session  In comparison to last session, Pt is progressing with treatment goals but is limited by fatigue  Pt educated on safety awareness, pacing through activities, fall prevention, and importance of OT treatment session  Spoke to Pt about D/C plan- Pt agreeable with good insight into deficits and lack of social support at home  Educated Pt on benefits of post-acute rehab following D/C, and Pt agreeable  Pt verbalized understanding  Pt will benefit from continued OT treatment t/o hospital stay to address limitations to occupational performance as defined above to maximize functional independence   Recommend post-acute rehab following d/c       OT Discharge Recommendation: Post-Acute Rehabilitation Services  OT - OK to Discharge: Yes(when medically cleared)    8/20/2020 1502 by Taya Willams  Outcome: Progressing  Note: Limitation: Decreased ADL status, Decreased UE strength, Decreased Safe judgement during ADL, Decreased cognition, Decreased endurance, Decreased high-level ADLs  Prognosis: Fair  Assessment: Pt participated in OT treatment session today to address barriers to occupational performance  Interventions focused on ADL retraining, functional transfers, and functional mobility  Pt agreeable to OT treatment today  Upon OT arrival, Pt was found OOB in chair  Pt participated in toileting, LB dressing, long distance functional mobility, toilet transfer, and STS transfers - refer to chart for A levels  Pt requires curing and encouragement throughout session  In comparison to last session, Pt is progressing with treatment goals but is limited by fatigue  Pt educated on safety awareness, pacing through activities, fall prevention, and importance of OT treatment session  Spoke to Pt about D/C plan- Pt agreeable with good insight into deficits and lack of social support at home  Educated Pt on benefits of post-acute rehab following D/C, and Pt agreeable  Pt verbalized understanding  Pt will benefit from continued OT treatment t/o hospital stay to address limitations to occupational performance as defined above to maximize functional independence   Recommend post-acute rehab following d/c       OT Discharge Recommendation: Post-Acute Rehabilitation Services  OT - OK to Discharge: Yes(when medically cleared)

## 2020-08-20 NOTE — OCCUPATIONAL THERAPY NOTE
OccupationalTherapy Progress Note     Patient Name: Inocencio Hayes  GLQBL'S Date: 8/20/2020  Problem List  Principal Problem:    Small bowel obstruction ([address] )          08/20/20 1156   Restrictions/Precautions   Weight Bearing Precautions Per Order No   Other Precautions Contact/isolation; Chair Alarm; Bed Alarm; Fall Risk   Lifestyle   Autonomy Pt reports being IND w/ all ADLS and IADLS (son occasionally A w/ chores); (+) drives PTA    Reciprocal Relationships Pt live w/ her son that has Autism and is mostly self-efficiant  Pt reports her other son is currently staying with her son to provide A as needed  Pt reports her son that has Autism is able to provide only limited asisst upon D/C  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   Intrinsic Gratification Pt reports enjoying being IND   Pain Assessment   Pain Assessment Tool FLACC   Pain Rating: FLACC (Rest) - Face 1   Pain Rating: FLACC (Rest) - Legs 0   Pain Rating: FLACC (Rest) - Activity 1   Pain Rating: FLACC (Rest) - Cry 0   Pain Rating: FLACC (Rest) - Consolability 0   Score: FLACC (Rest) 2   Pain Rating: FLACC (Activity) - Face 1   Pain Rating: FLACC (Activity) - Legs 0   Pain Rating: FLACC (Activity) - Activity 1   Pain Rating: FLACC (Activity) - Cry 1   Pain Rating: FLACC (Activity) - Consolability 1   Score: FLACC (Activity) 4   ADL   Where Assessed Chair   LB Dressing Assistance 2  Maximal Assistance   LB Dressing Deficit Don/doff R sock; Don/doff L sock   LB Dressing Comments Pt able to doff L sock by kicking it off  Unable to reach feet to don socks   Toileting Assistance  3  Moderate Assistance   Toileting Deficit Setup;Supervison/safety; Clothing management up;Clothing management down;Perineal hygiene   Toileting Comments requires A for toilet transfer, clothing management, and hygiene   Bed Mobility   Additional Comments Pt found OOB in chair upon OT arrival  Pt left OOB in chair w/ chair alarm on and all needs in reach following OT session   Transfers Sit to Stand 4  Minimal assistance   Additional items Assist x 1; Increased time required;Verbal cues   Stand to Sit 5  Supervision   Additional items Assist x 1; Increased time required;Verbal cues   Toilet transfer 3  Moderate assistance   Additional items Assist x 1; Increased time required;Verbal cues;Standard toilet   Additional Comments Transfers w/ RW  VC/TC for hand placement and safety during transfers  Functional Mobility   Functional Mobility 4  Minimal assistance  (Ax1)   Additional Comments Pt performed funcitonal mobility in hallway with Min Ax1 and RW  VC/TC for hand palcement and safety  Pt SOB - educated on deep breathing techniques - demonstrated F carryover  Additional items Rolling walker   Cognition   Overall Cognitive Status WFL   Arousal/Participation Alert; Cooperative   Attention Within functional limits   Orientation Level Oriented X4   Memory Decreased recall of precautions   Following Commands Follows one step commands without difficulty   Comments Pt cooperative to work with therapy following education of purpose  Pt with flat affect and appears lethargic  Pt requires encouragement to participate and responds well to detailed explanation of therapy purpose  Activity Tolerance   Activity Tolerance Patient limited by fatigue   Medical Staff Made Aware OT JOHNATHAN Brody   Assessment   Assessment Pt participated in OT treatment session today to address barriers to occupational performance  Interventions focused on ADL retraining, functional transfers, and functional mobility  Pt agreeable to OT treatment today  Upon OT arrival, Pt was found OOB in chair  Pt participated in toileting, LB dressing, long distance functional mobility, toilet transfer, and STS transfers - refer to chart for A levels  Pt requires curing and encouragement throughout session  In comparison to last session, Pt is progressing with treatment goals but is limited by fatigue   Pt educated on safety awareness, pacing through activities, fall prevention, and importance of OT treatment session  Spoke to Pt about D/C plan- Pt agreeable with good insight into deficits and lack of social support at home  Educated Pt on benefits of post-acute rehab following D/C, and Pt agreeable  Pt verbalized understanding  Pt will benefit from continued OT treatment t/o hospital stay to address limitations to occupational performance as defined above to maximize functional independence  Recommend post-acute rehab following d/c     Plan   Treatment Interventions ADL retraining;Functional transfer training;UE strengthening/ROM; Endurance training;Patient/family training;Equipment evaluation/education; Compensatory technique education;Continued evaluation; Activityengagement; Energy conservation   Goal Expiration Date 08/27/20  (goals extended + 5 days )   OT Treatment Day 2   OT Frequency 3-5x/wk   Recommendation   OT Discharge Recommendation Post-Acute Rehabilitation Services   OT - OK to Discharge Yes  (when medically cleared)   Modified [place] Scale   Modified [place] Scale 4       Tori Greenwood, OTS

## 2020-08-20 NOTE — PLAN OF CARE
Problem: Potential for Falls  Goal: Patient will remain free of falls  Description: INTERVENTIONS:  - Assess patient frequently for physical needs  -  Identify cognitive and physical deficits and behaviors that affect risk of falls    -  [place] fall precautions as indicated by assessment   - Educate patient/family on patient safety including physical limitations  - Instruct patient to call for assistance with activity based on assessment  - Modify environment to reduce risk of injury  - Consider OT/PT consult to assist with strengthening/mobility  Outcome: Progressing     Problem: Prexisting or High Potential for Compromised Skin Integrity  Goal: Skin integrity is maintained or improved  Description: INTERVENTIONS:  - Identify patients at risk for skin breakdown  - Assess and monitor skin integrity  - Assess and monitor nutrition and hydration status  - Monitor labs   - Assess for incontinence   - Turn and reposition patient  - Assist with mobility/ambulation  - Relieve pressure over bony prominences  - Avoid friction and shearing  - Provide appropriate hygiene as needed including keeping skin clean and dry  - Evaluate need for skin moisturizer/barrier cream  - Collaborate with interdisciplinary team   - Patient/family teaching  - Consider wound care consult   Outcome: Progressing     Problem: CARDIOVASCULAR - ADULT  Goal: Maintains optimal cardiac output and hemodynamic stability  Description: INTERVENTIONS:  - Monitor I/O, vital signs and rhythm  - Monitor for S/S and trends of decreased cardiac output  - Administer and titrate ordered vasoactive medications to optimize hemodynamic stability  - Assess quality of pulses, skin color and temperature  - Assess for signs of decreased coronary artery perfusion  - Instruct patient to report change in severity of symptoms  Outcome: Progressing  Goal: Absence of cardiac dysrhythmias or at baseline rhythm  Description: INTERVENTIONS:  - Continuous cardiac monitoring, vital signs, obtain 12 lead EKG if ordered  - Administer antiarrhythmic and heart rate control medications as ordered  - Monitor electrolytes and administer replacement therapy as ordered  Outcome: Progressing     Problem: RESPIRATORY - ADULT  Goal: Achieves optimal ventilation and oxygenation  Description: INTERVENTIONS:  - Assess for changes in respiratory status  - Assess for changes in mentation and behavior  - Position to facilitate oxygenation and minimize respiratory effort  - Oxygen administered by appropriate delivery if ordered  - Initiate smoking cessation education as indicated  - Encourage broncho-pulmonary hygiene including cough, deep breathe, Incentive Spirometry  - Assess the need for suctioning and aspirate as needed  - Assess and instruct to report SOB or any respiratory difficulty  - Respiratory Therapy support as indicated  Outcome: Progressing     Problem: GASTROINTESTINAL - ADULT  Goal: Minimal or absence of nausea and/or vomiting  Description: INTERVENTIONS:  - Administer IV fluids if ordered to ensure adequate hydration  - Maintain NPO status until nausea and vomiting are resolved  - Nasogastric tube if ordered  - Administer ordered antiemetic medications as needed  - Provide nonpharmacologic comfort measures as appropriate  - Advance diet as tolerated, if ordered  - Consider nutrition services referral to assist patient with adequate nutrition and appropriate food choices  Outcome: Progressing  Goal: Maintains or returns to baseline bowel function  Description: INTERVENTIONS:  - Assess bowel function  - Encourage oral fluids to ensure adequate hydration  - Administer IV fluids if ordered to ensure adequate hydration  - Administer ordered medications as needed  - Encourage mobilization and activity  - Consider nutritional services referral to assist patient with adequate nutrition and appropriate food choices  Outcome: Progressing  Goal: Maintains adequate nutritional intake  Description: INTERVENTIONS:  - Monitor percentage of each meal consumed  - Identify factors contributing to decreased intake, treat as appropriate  - Assist with meals as needed  - Monitor I&O, weight, and lab values if indicated  - Obtain nutrition services referral as needed  Outcome: Progressing  Goal: Establish and maintain optimal ostomy function  Description: INTERVENTIONS:  - Assess bowel function  - Encourage oral fluids to ensure adequate hydration  - Administer IV fluids if ordered to ensure adequate hydration   - Administer ordered medications as needed  - Encourage mobilization and activity  - Nutrition services referral to assist patient with appropriate food choices  - Assess stoma site  - Consider wound care consult   Outcome: Progressing     Problem: METABOLIC, FLUID AND ELECTROLYTES - ADULT  Goal: Electrolytes maintained within normal limits  Description: INTERVENTIONS:  - Monitor labs and assess patient for signs and symptoms of electrolyte imbalances  - Administer electrolyte replacement as ordered  - Monitor response to electrolyte replacements, including repeat lab results as appropriate  - Instruct patient on fluid and nutrition as appropriate  Outcome: Progressing  Goal: Fluid balance maintained  Description: INTERVENTIONS:  - Monitor labs   - Monitor I/O and WT  - Instruct patient on fluid and nutrition as appropriate  - Assess for signs & symptoms of volume excess or deficit  Outcome: Progressing  Goal: Glucose maintained within target range  Description: INTERVENTIONS:  - Monitor Blood Glucose as ordered  - Assess for signs and symptoms of hyperglycemia and hypoglycemia  - Administer ordered medications to maintain glucose within target range  - Assess nutritional intake and initiate nutrition service referral as needed  Outcome: Progressing     Problem: SKIN/TISSUE INTEGRITY - ADULT  Goal: Incision(s), wounds(s) or drain site(s) healing without S/S of infection  Description: INTERVENTIONS  - Assess and document risk factors for skin impairment   - Assess and document dressing, incision, wound bed, drain sites and surrounding tissue  - Consider nutrition services referral as needed  - Oral mucous membranes remain intact  - Provide patient/ family education  Outcome: Progressing     Problem: Nutrition/Hydration-ADULT  Goal: Nutrient/Hydration intake appropriate for improving, restoring or maintaining nutritional needs  Description: Monitor and assess patient's nutrition/hydration status for malnutrition  Collaborate with interdisciplinary team and initiate plan and interventions as ordered  Monitor patient's weight and dietary intake as ordered or per policy  Utilize nutrition screening tool and intervene as necessary  Determine patient's food preferences and provide high-protein, high-caloric foods as appropriate       INTERVENTIONS:  - Monitor oral intake, urinary output, labs, and treatment plans  - Assess nutrition and hydration status and recommend course of action  - Evaluate amount of meals eaten  - Assist patient with eating if necessary   - Allow adequate time for meals  - Recommend/ encourage appropriate diets, oral nutritional supplements, and vitamin/mineral supplements  - Order, calculate, and assess calorie counts as needed  - Recommend, monitor, and adjust tube feedings and TPN/PPN based on assessed needs  - Assess need for intravenous fluids  - Provide specific nutrition/hydration education as appropriate  - Include patient/family/caregiver in decisions related to nutrition  Outcome: Progressing

## 2020-08-20 NOTE — PHYSICAL THERAPY NOTE
PHYSICAL THERAPY TREATMENT NOTE      Patient Name: Toni MARKS Date: 8/20/2020 08/20/20 1325   Pain Assessment   Pain Assessment Tool 0-10   Pain Score No Pain   Restrictions/Precautions   Weight Bearing Precautions Per Order No   Other Precautions Contact/isolation; Fall Risk;Pain   General   Chart Reviewed Yes   Response to Previous Treatment Patient with no complaints from previous session  Family/Caregiver Present No   Cognition   Overall Cognitive Status WFL   Arousal/Participation Alert; Cooperative   Attention Attends with cues to redirect   Orientation Level Oriented X4   Memory Decreased recall of precautions   Following Commands Follows one step commands with increased time or repetition   Comments Pt presented as very tired at begining of session, but acknowledged the importance in participating and therapy and was willing to "give it her best shot"   Subjective   Subjective "Lets get this over with!"   Bed Mobility   Supine to Sit Unable to assess   Additional Comments Pt found sitting up in chair upon initial PT arrival  PT returned to chair w/ all needs in reach and OT present s/p PT session  Transfers   Sit to Stand 5  Supervision   Additional items Armrests; Increased time required;Verbal cues   Stand to Sit 5  Supervision   Additional items Assist x 1;Verbal cues; Increased time required;Armrests   Additional Comments Pt required VC and TC for hand placement and safety  Pt able to incoorperate education given by PT on body position in relation to AD when walking  RW used for all transfers  Ambulation/Elevation   Gait pattern Short stride; Excessively slow; Foward flexed; Inconsistent wisam;Decreased foot clearance;Shuffling   Gait Assistance 4  Minimal assist  (assist of 2nd for chair follow)   Additional items Assist x 1;Verbal cues; Tactile cues   Assistive Device Rolling walker   Distance 50ft x1; 30ft x1  (seated rest break in between)   Stair Management Assistance 4 Minimal assist   Additional items Assist x 1;Verbal cues; Tactile cues; Increased time required  (u/l HHA)   Stair Management Technique One rail L;Step to pattern; Foreward   Number of Stairs 3  (pt unable to complete any additional stairs 2/2 fatigue)   Balance   Static Sitting Good   Dynamic Sitting Fair +   Static Standing Fair   Dynamic Standing Fair   Ambulatory Fair -   Endurance Deficit   Endurance Deficit Yes   Endurance Deficit Description fatigue, weakness   Activity Tolerance   Activity Tolerance Patient limited by fatigue   Medical Staff Made Aware PT Ketan Brooke RN   Nurse Made Aware RN cleared pt for PT session   Assessment   Prognosis Good   Problem List Decreased strength;Decreased endurance; Impaired balance;Decreased range of motion;Decreased mobility; Decreased safety awareness   Assessment Pt seen by PT on 08/20/20 for PT treatment session w/ a focus on ambulation and stairs  Pt making fair progress toward goals  Pt completed ambulatory distance of 80ft requiring a prolonged sitting rest break during 2/2 fatigue  Pt unable to ascend/descend enough stairs needed to navigate home environment safely without assistance  Pt completed 3 stairs at Min-A w/ U/L HHA and U/L railing before stating she was too tired to complete anymore  Pt left sitting up in chair in the care of OT's with all needs in reach at end of therapy session  Pt would benefit from skilled PT in order to address impairments and functional limitations  Pt not safe to D/C home at this time- due to decreased strength, endurance, and balance pt unable navigate stairs within home environment on her own and does not have prolonged social support at home  PT to continue to follow pt 3-5x /week, and would recommend rehab pending medical clearance     Barriers to Discharge Inaccessible home environment;Decreased caregiver support   Goals   Patient Goals To get some rest   STG Expiration Date 08/26/20   Plan   Treatment/Interventions Functional transfer training;LE strengthening/ROM; Elevations; Therapeutic exercise; Endurance training;Patient/family training;Equipment eval/education; Bed mobility;Gait training;Spoke to nursing;OT   Progress Progressing toward goals   PT Frequency Other (Comment)  (3-5x /week)   Recommendation   PT Discharge Recommendation Post-Acute Rehabilitation Services   Equipment Recommended Tee Kevin   PT - OK to Discharge Yes  (pending medical clearance)     Aleksandra Blue, SPT

## 2020-08-20 NOTE — QUICK NOTE
Nurse-Patient-Provider rounds were completed with the patient's nurse today, Mandeep Antony  We discussed the plan is to   - local wound care  - ostomy care  - rehab placement    We reviewed all of the invasive devices/lines/telemetry orders   - PICC line due to no peripheral access    DVT prophylaxis:  - Eliquis    Diet:  - house    Pain Assessment / Plan:  - Continue current pain management regimen    Mobility Assessment / Plan:  - Activity as tolerated  Goals / Barriers for discharge:  - rehab placement    Case management following; case and discharge needs discussed  All questions and concerns were addressed        Lori Gan PA-C

## 2020-08-20 NOTE — QUICK NOTE
Patient's midline wound was palpated for areas of fluctuance  Staples were removed just distal to the umbilicus as well as proximally  The areas were probed, revealing purulent drainage from the proximal area  The skin was further opened to allow better drainage  The cavity was irrigated with 20ml of NSS  Both opened areas were packed with 1/2 inch plain packing gauze  The wound was dressed with 4x4 dry gauze and ABD

## 2020-08-20 NOTE — SOCIAL WORK
Met with patient and son to discuss discharge plans  Reviewed therapy notes from today and both son and patient agree that although she would like to go home she is not yet at level of function to manage at home with [facility]NA  Son provided additional snf choices of [address], [address] [facility] referrals were made  [facility] [phone]  She would approve patient pending insurance auth but she has no available beds this week

## 2020-08-20 NOTE — PLAN OF CARE
Problem: PHYSICAL THERAPY ADULT  Goal: Performs mobility at highest level of function for planned discharge setting  See evaluation for individualized goals  Outcome: Progressing  Note: Prognosis: Good  Problem List: Decreased strength, Decreased endurance, Impaired balance, Decreased range of motion, Decreased mobility, Decreased safety awareness  Assessment: Pt seen by PT on 08/20/20 for PT treatment session w/ a focus on ambulation and stairs  Pt making fair progress toward goals  Pt completed ambulatory distance of 80ft requiring a prolonged sitting rest break during 2/2 fatigue  Pt unable to ascend/descend enough stairs needed to navigate home environment safely without assistance  Pt completed 3 stairs at Min-A w/ U/L HHA and U/L railing before stating she was too tired to complete anymore  Pt left sitting up in chair in the care of OT's with all needs in reach at end of therapy session  Pt would benefit from skilled PT in order to address impairments and functional limitations  Pt not safe to D/C home at this time- due to decreased strength, endurance, and balance pt unable navigate stairs within home environment on her own and does not have prolonged social support at home  PT to continue to follow pt 3-5x /week, and would recommend rehab pending medical clearance  Barriers to Discharge: Inaccessible home environment, Decreased caregiver support     PT Discharge Recommendation: [id number]     PT - OK to Discharge: Yes(pending medical clearance)    See flowsheet documentation for full assessment

## 2020-08-20 NOTE — UTILIZATION REVIEW
Continued Stay Review    Date:8/20/2020                          Current Patient Class: INPT  Current Level of Care: TELEMETRY    HPI:75 y o  female initially admitted Inpatient on  [email or web address] d/t incarcerated parastomal hernia s/p ex-lap, reduction and parastomal hernia repair with Phasix mesh, LUANN secondary to obstructive uropathy  Hypertensive episodes, OVSS  Feels well  No complaints  Tolerating diet  No chest pain or shortness of breath  Surg soft diet  ensures  OOB  DVT PPX  PT/OT  8/19 Nephrology note:  1  Acute Kidney Injury secondary to hemodynamic fluctuations as well as obstructive uropathy given hydronephrosis  ? Urine:  Red, turbid large blood positive nitrite large leukocytes from July 14  ? Renal US:  Multiple calculi at the inferior pole of the right kidney bilateral hydroureteronephrosis to the level of the ileal conduit  ? Plan:  Is making some urine and creatinine is back to baseline tolerating p o  Intake will discontinue intravenous fluid oral mucosa is moist monitor urine output  ? No active interventions by Nephrology  Will see as needed  ? Has appointment with Dr Macarena Bo in September  Can follow up at that time  2  Electrolytes:  ? Currently stable  3  Acid/Base:  ? No anion gap and bicarb acceptable  4  Hypertension  ? Currently just on metoprolol as needed avoid hypotension  ? Blood pressure is slightly lower will place hold parameters on the metoprolol of 130 does not appear to have a history of atrial fibrillation  5  Volume Status  ? Now with positive fluid balance tolerating p o  Intake monitor off fluids  6  Anemia of chronic kidney disease  ? Hemoglobin stable around 7 6, per ICU team  7  MBD  ? Phosphorus slightly low, encourage p o  Intake and monitor  8  Clinical Course/Health Maintanance/Risk Reduction  ? With incarcerated parastomal hernia status post ex lap with reduction and parastomal hernia repair with mesh  ? Needs rehab   Ongoing Dispo Planning    Pertinent Labs/Diagnostic Results:       Results from last 7 days   Lab Units 08/20/20  0525 08/19/20  0507 08/18/20  0532 08/17/20  0445 08/16/20  0447  08/14/20  0448   WBC Thousand/uL 9 11 8 13 7 50 5 56 5 37   < > 5 38   HEMOGLOBIN g/dL 8 0* 7 6* 7 7* 7 2* 7 7*   < > 8 3*   HEMATOCRIT % 26 1* 25 4* 25 1* 23 7* 25 8*   < > 27 0*   PLATELETS Thousands/uL 203 194 187 174 209   < > 210   BANDS PCT %  --   --   --   --  1  --  5    < > = values in this interval not displayed  Results from last 7 days   Lab Units 08/20/20  0525 08/19/20  0507 08/18/20  0532 08/17/20  0444 08/16/20 0447  08/14/20  0448   SODIUM mmol/L 138 141 142 143 145   < > 144   POTASSIUM mmol/L 4 0 4 4 3 8 4 1 3 2*   < > 4 0   CHLORIDE mmol/L 108 111* 110* 109* 107   < > 103   CO2 mmol/L 24 24 25 27 30   < > 33*   ANION GAP mmol/L 6 6 7 7 8   < > 8   BUN mg/dL 25 27* 31* 31* 35*   < > 46*   CREATININE mg/dL 2 79* 2 75* 2 75* 2 54* 2 70*   < > 3 64*   EGFR ml/min/1 73sq m 16 16 16 18 17   < > 12   CALCIUM mg/dL 8 1* 7 4* 7 7* 7 6* 7 8*   < > 8 5   MAGNESIUM mg/dL  --   --   --   --   --   --  2 4   PHOSPHORUS mg/dL  --   --   --  2 0*  --   --  4 0    < > = values in this interval not displayed  Results from last 7 days   Lab Units 08/18/20  2114 08/18/20  1557 08/18/20  1056 08/18/20  0636 08/17/20  2034 08/17/20  1535 08/17/20  1128 08/17/20  0631 08/16/20  1747 08/16/20  1604 08/16/20  1155 08/16/20  0616   POC GLUCOSE mg/dl 98 111 108 103 107 104 112 100 119 104 126 106     Results from last 7 days   Lab Units 08/20/20  0525 08/19/20  0507 08/18/20  0532 08/17/20  0444 08/16/20  0447 08/15/20  0505 08/14/20  0448   GLUCOSE RANDOM mg/dL 112 104 105 96 109 95 111       Results from last 7 days   Lab Units 08/17/20  0445 08/16/20  0447 08/15/20  0505   PTT seconds 38* 76* 79*     8/14 kub abd:Enteric tube in place distal tip at the gastroduodenal junction      8/14 IR central line:Ultrasound and fluoroscopically guided placement of a 5-Malay double-lumen power injectable central venous catheter via the right external jugular vein     Catheter is ready for immediate use      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  8/13 cxr:No acute cardiopulmonary disease      Feeding tube in the duodenum  8/12 xray abd:Nonspecific diffuse air distended small and large bowel loops  This may be due to a postoperative ileus  Continued follow-up recommended      8/12 loopogram:  Loopogram demonstrating satisfactory appearance of an ileal conduit with free reflux of administered contrast into bilateral ureters, renal pelves and calyces  No ureteral stricture or filling defects seen        Vital Signs:   08/20/20 0811   97 8 °F (36 6 °C)   88   18   165/89      96 %   None (Room air)   Sitting    08/19/20 2334   98 2 °F (36 8 °C)   89   17   144/88      98 %   None (Room air)   Lying    08/19/20 2136   98 9 °F (37 2 °C)   86   18   184/99Abnormal        97 %   None (Room air)   Lying    08/19/20 0800      96      137/100   114      None (Room air)       08/19/20 0005   98 8 °F (37 1 °C)   96   18   153/92   105   95 %   None (Room air)   Lying    08/18/20 2352                     None (Room air)       08/18/20 2110      101      153/97                08/18/20 1458   97 8 °F (36 6 °C)   96      136/72      98 %   None (Room air)   Lying    08/18/20 0659   98 6 °F (37 °C)   94   18   110/78      95 %   None (Room air)            Medications:   Scheduled Medications:  apixaban, 2 5 mg, Oral, BID  benzocaine, 2 spray, Mucosal, Once  cephalexin, 500 mg, Oral, Q8H LONG  citalopram, 20 mg, Oral, Daily  levothyroxine, 75 mcg, Oral, Early Morning  melatonin, 3 mg, Oral, HS  metoprolol tartrate, 25 mg, Oral, Q12H LONG  vancomycin, 125 mg, Oral, Q12H LONG      heparin (porcine) 25,000 units in 250 mL infusion (premix)    Rate: 2 6-25 5 mL/hr Dose: 3-30 Units/kg/hr  Weight Dosing Info: 85 kg (Order-Specific)  Freq: Titrated Route: IV  Last Dose: Stopped (08/16/20 3689)  Start: 08/11/20 2245 End: 08/16/20 0617     multi-electrolyte (PLASMALYTE-A/ISOLYTE-S PH 7 4) IV solution    Rate: 75 mL/hr Dose: 75 mL/hr  Freq: Continuous Route: IV  Indications of Use: IV Hydration  Last Dose: Stopped (08/17/20 1443)  Start: 08/11/20 1345 End: 08/17/20 1442     PRN Meds:  hydrALAZINE, 10 mg, Intravenous, Q6H PRN 8/16 x 1  HYDROmorphone, 0 5 mg, Intravenous, Q3H PRN 8/16 x 1, 8/17 x 3  Labetalol HCl, 10 mg, Intravenous, Q4H PRN  ondansetron, 4 mg, Intravenous, Q4H PRN 8/17 x 1  oxyCODONE, 2 5 mg, Oral, Q4H PRN 8/18 x 1, 8/19 x 2  oxyCODONE, 5 mg, Oral, Q4H PRN  simethicone, 80 mg, Oral, Q6H PRN 8/18 x 1, 8/19 x 1    Wound care  PT/OT  Surgical diet    Discharge Plan: D    Network Utilization Review Department  [email or web address]  ATTENTION: Please call with any questions or concerns to [phone] and carefully listen to the prompts so that you are directed to the right person  All voicemails are confidential   [facility] all requests for admission clinical reviews, approved or denied determinations and any other requests to dedicated fax number below belonging to the campus where the patient is receiving treatment   List of dedicated fax numbers for the Facilities:  [address] DENIALS (Administrative/Medical Necessity) [phone]   [address] (Maternity/NICU/Pediatrics) [phone]   Rahul Hutchinson [phone]   Cat Benedict [phone]   Milly Orosco [phone]   Leoncio Pierre [phone]   [address] [place] [address] [phone]   [facility]  [phone]   [address] [facility], [address] [phone]

## 2020-08-20 NOTE — PROGRESS NOTES
Progress Note - Pam Guzman 77 y o  female MRN: [id number]    Unit/Bed#: [facility] 946-15 Encounter: [id number]      Assessment:  Giuseppe Harder a 77 y  o  female w/ incarcerated parastomal hernia s/p ex-lap, reduction and parastomal hernia repair with Phasix mesh, LUANN secondary to obstructive uropathy    Hypertensive episodes, OVSS    Plan:  Surg soft diet/ ensures  OOB  DVT PPX  PT/OT  case management referrals for DC    Subjective:   Feels well  No complaints  Tolerating diet  No chest pain or shortness of breath  Objective:     Vitals: Blood pressure 144/88, pulse 89, temperature 98 2 °F (36 8 °C), temperature source Oral, resp  rate 17, height 5' (1 524 m), weight 89 4 kg (197 lb 1 5 oz), SpO2 98 %, not currently breastfeeding  ,Body mass index is 38 49 kg/m²  Intake/Output Summary (Last 24 hours) at 8/20/2020 0516  Last data filed at 8/19/2020 2331  Gross per 24 hour   Intake 220 ml   Output 975 ml   Net -755 ml       Physical Exam  General: NAD  HEENT: NC/AT  MMM  Cv: RRR  Lungs: normal effort  Ab: Soft, NT/ND, Wound with some mild erythema and serous drainage-no fluctuance  Erythema on left side of the abdomen    Ex: no CCE  Neuro: AAOx3    Scheduled Meds:  Current Facility-Administered Medications   Medication Dose Route Frequency Provider Last Rate    apixaban  2 5 mg Oral BID Boubacar Driscoll MD      benzocaine  2 spray Mucosal Once Amandeep Dangelo MD      cephalexin  500 mg Oral [facility] Edmond Taylor MD      citalopram  20 mg Oral Daily Tencheng Sawyer, [employer]      hydrALAZINE  10 mg Intravenous Q6H PRN Boubacar Driscoll MD      HYDROmorphone  0 5 mg Intravenous Q3H PRN Elian Mccrary MD      Labetalol HCl  10 mg Intravenous Q4H PRN Boubacar Driscoll MD      levothyroxine  75 mcg Oral Early Morning [employer]      melatonin  3 mg Oral HS Ajay Sawyer PA-C      metoprolol tartrate  25 mg Oral Q12H [facility] Angela Aleman DO      ondansetron  4 mg Intravenous Q4H PRN Elian Mccrary MD  oxyCODONE  2 5 mg Oral Q4H PRN Trevon Jeffries PA-C      oxyCODONE  5 mg Oral Q4H PRN Trevon Jeffries PA-C      simethicone  80 mg Oral Q6H PRN VICTORINA Lieberman      vancomycin  125 mg Oral Q12H [address] José Correa MD       Continuous Infusions:   PRN Meds: hydrALAZINE    HYDROmorphone    Labetalol HCl    ondansetron    oxyCODONE    oxyCODONE    simethicone      Invasive Devices     Peripherally Inserted Central Catheter Line            PICC Line 08/14/20 Right Other (Comment) 5 days          Drain            Urostomy Ileal conduit RUQ 1415 days                Lab, Imaging and other studies: I have personally reviewed pertinent reports      VTE Pharmacologic Prophylaxis: Heparin  VTE Mechanical Prophylaxis: sequential compression device

## 2020-08-20 NOTE — SOCIAL WORK
Call received from [facility], admissions with Violetta Avelar and she now reports they did not receive [facility] referral  Clinical records were faxed to her at [phone]  Will send her today's therapy notes when typed

## 2020-08-21 LAB
ANION GAP SERPL CALCULATED.3IONS-SCNC: 6 MMOL/L (ref 4–13)
BUN SERPL-MCNC: 27 MG/DL (ref 5–25)
CALCIUM SERPL-MCNC: 7.9 MG/DL (ref 8.3–10.1)
CHLORIDE SERPL-SCNC: 108 MMOL/L (ref 100–108)
CO2 SERPL-SCNC: 24 MMOL/L (ref 21–32)
CREAT SERPL-MCNC: 2.72 MG/DL (ref 0.6–1.3)
ERYTHROCYTE [DISTWIDTH] IN BLOOD BY AUTOMATED COUNT: 14.1 % (ref 11.6–15.1)
GFR SERPL CREATININE-BSD FRML MDRD: 17 ML/MIN/1.73SQ M
GLUCOSE SERPL-MCNC: 99 MG/DL (ref 65–140)
HCT VFR BLD AUTO: 24.6 % (ref 34.8–46.1)
HGB BLD-MCNC: 7.8 G/DL (ref 11.5–15.4)
MCH RBC QN AUTO: 30.6 PG (ref 26.8–34.3)
MCHC RBC AUTO-ENTMCNC: 31.7 G/DL (ref 31.4–37.4)
MCV RBC AUTO: 97 FL (ref 82–98)
PLATELET # BLD AUTO: 195 THOUSANDS/UL (ref 149–390)
PMV BLD AUTO: 10.6 FL (ref 8.9–12.7)
POTASSIUM SERPL-SCNC: 3.8 MMOL/L (ref 3.5–5.3)
RBC # BLD AUTO: 2.55 MILLION/UL (ref 3.81–5.12)
SODIUM SERPL-SCNC: 138 MMOL/L (ref 136–145)
WBC # BLD AUTO: 7.28 THOUSAND/UL (ref 4.31–10.16)

## 2020-08-21 PROCEDURE — 97116 GAIT TRAINING THERAPY: CPT

## 2020-08-21 PROCEDURE — 97530 THERAPEUTIC ACTIVITIES: CPT

## 2020-08-21 PROCEDURE — 80048 BASIC METABOLIC PNL TOTAL CA: CPT | Performed by: SURGERY

## 2020-08-21 PROCEDURE — 99024 POSTOP FOLLOW-UP VISIT: CPT | Performed by: SURGERY

## 2020-08-21 PROCEDURE — 97110 THERAPEUTIC EXERCISES: CPT

## 2020-08-21 PROCEDURE — 85027 COMPLETE CBC AUTOMATED: CPT | Performed by: SURGERY

## 2020-08-21 RX ADMIN — CEPHALEXIN 500 MG: 500 CAPSULE ORAL at 14:09

## 2020-08-21 RX ADMIN — Medication 125 MG: at 21:47

## 2020-08-21 RX ADMIN — LEVOTHYROXINE SODIUM 75 MCG: 75 TABLET ORAL at 06:12

## 2020-08-21 RX ADMIN — MELATONIN 3 MG: at 21:47

## 2020-08-21 RX ADMIN — CITALOPRAM HYDROBROMIDE 20 MG: 20 TABLET ORAL at 09:08

## 2020-08-21 RX ADMIN — METOPROLOL TARTRATE 25 MG: 25 TABLET, FILM COATED ORAL at 09:08

## 2020-08-21 RX ADMIN — CEPHALEXIN 500 MG: 500 CAPSULE ORAL at 21:47

## 2020-08-21 RX ADMIN — APIXABAN 2.5 MG: 2.5 TABLET, FILM COATED ORAL at 09:09

## 2020-08-21 RX ADMIN — Medication 125 MG: at 09:09

## 2020-08-21 RX ADMIN — CEPHALEXIN 500 MG: 500 CAPSULE ORAL at 06:12

## 2020-08-21 RX ADMIN — APIXABAN 2.5 MG: 2.5 TABLET, FILM COATED ORAL at 17:31

## 2020-08-21 NOTE — PLAN OF CARE
Problem: Potential for Falls  Goal: Patient will remain free of falls  Description: INTERVENTIONS:  - Assess patient frequently for physical needs  -  Identify cognitive and physical deficits and behaviors that affect risk of falls    -  [place] fall precautions as indicated by assessment   - Educate patient/family on patient safety including physical limitations  - Instruct patient to call for assistance with activity based on assessment  - Modify environment to reduce risk of injury  - Consider OT/PT consult to assist with strengthening/mobility  Outcome: Progressing     Problem: Prexisting or High Potential for Compromised Skin Integrity  Goal: Skin integrity is maintained or improved  Description: INTERVENTIONS:  - Identify patients at risk for skin breakdown  - Assess and monitor skin integrity  - Assess and monitor nutrition and hydration status  - Monitor labs   - Assess for incontinence   - Turn and reposition patient  - Assist with mobility/ambulation  - Relieve pressure over bony prominences  - Avoid friction and shearing  - Provide appropriate hygiene as needed including keeping skin clean and dry  - Evaluate need for skin moisturizer/barrier cream  - Collaborate with interdisciplinary team   - Patient/family teaching  - Consider wound care consult   Outcome: Progressing     Problem: CARDIOVASCULAR - ADULT  Goal: Maintains optimal cardiac output and hemodynamic stability  Description: INTERVENTIONS:  - Monitor I/O, vital signs and rhythm  - Monitor for S/S and trends of decreased cardiac output  - Administer and titrate ordered vasoactive medications to optimize hemodynamic stability  - Assess quality of pulses, skin color and temperature  - Assess for signs of decreased coronary artery perfusion  - Instruct patient to report change in severity of symptoms  Outcome: Progressing  Goal: Absence of cardiac dysrhythmias or at baseline rhythm  Description: INTERVENTIONS:  - Continuous cardiac monitoring, vital signs, obtain 12 lead EKG if ordered  - Administer antiarrhythmic and heart rate control medications as ordered  - Monitor electrolytes and administer replacement therapy as ordered  Outcome: Progressing     Problem: RESPIRATORY - ADULT  Goal: Achieves optimal ventilation and oxygenation  Description: INTERVENTIONS:  - Assess for changes in respiratory status  - Assess for changes in mentation and behavior  - Position to facilitate oxygenation and minimize respiratory effort  - Oxygen administered by appropriate delivery if ordered  - Initiate smoking cessation education as indicated  - Encourage broncho-pulmonary hygiene including cough, deep breathe, Incentive Spirometry  - Assess the need for suctioning and aspirate as needed  - Assess and instruct to report SOB or any respiratory difficulty  - Respiratory Therapy support as indicated  Outcome: Progressing     Problem: GASTROINTESTINAL - ADULT  Goal: Minimal or absence of nausea and/or vomiting  Description: INTERVENTIONS:  - Administer IV fluids if ordered to ensure adequate hydration  - Maintain NPO status until nausea and vomiting are resolved  - Nasogastric tube if ordered  - Administer ordered antiemetic medications as needed  - Provide nonpharmacologic comfort measures as appropriate  - Advance diet as tolerated, if ordered  - Consider nutrition services referral to assist patient with adequate nutrition and appropriate food choices  Outcome: Progressing  Goal: Maintains or returns to baseline bowel function  Description: INTERVENTIONS:  - Assess bowel function  - Encourage oral fluids to ensure adequate hydration  - Administer IV fluids if ordered to ensure adequate hydration  - Administer ordered medications as needed  - Encourage mobilization and activity  - Consider nutritional services referral to assist patient with adequate nutrition and appropriate food choices  Outcome: Progressing  Goal: Maintains adequate nutritional intake  Description: INTERVENTIONS:  - Monitor percentage of each meal consumed  - Identify factors contributing to decreased intake, treat as appropriate  - Assist with meals as needed  - Monitor I&O, weight, and lab values if indicated  - Obtain nutrition services referral as needed  Outcome: Progressing  Goal: Establish and maintain optimal ostomy function  Description: INTERVENTIONS:  - Assess bowel function  - Encourage oral fluids to ensure adequate hydration  - Administer IV fluids if ordered to ensure adequate hydration   - Administer ordered medications as needed  - Encourage mobilization and activity  - Nutrition services referral to assist patient with appropriate food choices  - Assess stoma site  - Consider wound care consult   Outcome: Progressing     Problem: METABOLIC, FLUID AND ELECTROLYTES - ADULT  Goal: Electrolytes maintained within normal limits  Description: INTERVENTIONS:  - Monitor labs and assess patient for signs and symptoms of electrolyte imbalances  - Administer electrolyte replacement as ordered  - Monitor response to electrolyte replacements, including repeat lab results as appropriate  - Instruct patient on fluid and nutrition as appropriate  Outcome: Progressing  Goal: Fluid balance maintained  Description: INTERVENTIONS:  - Monitor labs   - Monitor I/O and WT  - Instruct patient on fluid and nutrition as appropriate  - Assess for signs & symptoms of volume excess or deficit  Outcome: Progressing  Goal: Glucose maintained within target range  Description: INTERVENTIONS:  - Monitor Blood Glucose as ordered  - Assess for signs and symptoms of hyperglycemia and hypoglycemia  - Administer ordered medications to maintain glucose within target range  - Assess nutritional intake and initiate nutrition service referral as needed  Outcome: Progressing     Problem: Nutrition/Hydration-ADULT  Goal: Nutrient/Hydration intake appropriate for improving, restoring or maintaining nutritional needs  Description: Monitor and assess patient's nutrition/hydration status for malnutrition  Collaborate with interdisciplinary team and initiate plan and interventions as ordered  Monitor patient's weight and dietary intake as ordered or per policy  Utilize nutrition screening tool and intervene as necessary  Determine patient's food preferences and provide high-protein, high-caloric foods as appropriate       INTERVENTIONS:  - Monitor oral intake, urinary output, labs, and treatment plans  - Assess nutrition and hydration status and recommend course of action  - Evaluate amount of meals eaten  - Assist patient with eating if necessary   - Allow adequate time for meals  - Recommend/ encourage appropriate diets, oral nutritional supplements, and vitamin/mineral supplements  - Order, calculate, and assess calorie counts as needed  - Recommend, monitor, and adjust tube feedings and TPN/PPN based on assessed needs  - Assess need for intravenous fluids  - Provide specific nutrition/hydration education as appropriate  - Include patient/family/caregiver in decisions related to nutrition  Outcome: Progressing     Problem: SKIN/TISSUE INTEGRITY - ADULT  Goal: Incision(s), wounds(s) or drain site(s) healing without S/S of infection  Description: INTERVENTIONS  - Assess and document risk factors for skin impairment   - Assess and document dressing, incision, wound bed, drain sites and surrounding tissue  - Consider nutrition services referral as needed  - Oral mucous membranes remain intact  - Provide patient/ family education  Outcome: Progressing

## 2020-08-21 NOTE — PHYSICAL THERAPY NOTE
Physical Therapy Progress Note        08/21/20 3174   Pain Assessment   Pain Assessment Tool 0-10   Pain Score No Pain   Hospital Pain Intervention(s) Ambulation/increased activity;Repositioned; Emotional support   Restrictions/Precautions   Weight Bearing Precautions Per Order No   Other Precautions Contact/isolation; Bed Alarm; Fall Risk   General   Chart Reviewed Yes   Response to Previous Treatment Patient with no complaints from previous session  Family/Caregiver Present Yes   Cognition   Overall Cognitive Status WFL   Arousal/Participation Alert; Cooperative   Comments Patient is pleasant and cooperative throughout session   Transfers   Sit to Stand 4  Minimal assistance   Additional items Increased time required;Verbal cues   Stand to Sit 5  Supervision   Additional items Increased time required;Verbal cues   Toilet transfer 4  Minimal assistance   Additional items Assist x 1; Increased time required;Verbal cues;Standard toilet   Ambulation/Elevation   Gait pattern Excessively slow; Short stride;Decreased foot clearance   Gait Assistance 4  Minimal assist   Additional items Assist x 1  (x2 for chair follow)   Assistive Device Rolling walker   Distance 50 feet x2    Stair Management Assistance 4  Minimal assist   Additional items Assist x 1;Verbal cues   Balance   Static Sitting Good   Dynamic Sitting Fair +   Static Standing Fair   Dynamic Standing Fair   Endurance Deficit   Endurance Deficit Yes   Endurance Deficit Description Fatigue, weakness   Activity Tolerance   Activity Tolerance Patient limited by fatigue   Nurse Made Aware Appropriate to see per RN   Exercises   Hip Flexion Sitting;10 reps;AROM; Bilateral   Hip Abduction Sitting;10 reps;AROM; Bilateral   Knee AROM Long Arc Quad Sitting;10 reps;AROM; Bilateral   Assessment   Prognosis Good   Problem List Decreased strength;Decreased endurance; Impaired balance;Decreased mobility; Decreased safety awareness   Assessment Patient agreeable to participate in therapy  She was able to stand with min A from low surface and  RW without LOB  She was able to ambulate near household distances with min A, noted muscle fatigue requires standing rest breaks  She states she is too fatigued to trial steps this visit  Patient performed TE as noted  She would continue to benefit from skilled PT to maximize functional independence  Barriers to Discharge Inaccessible home environment;Decreased caregiver support   Goals   Patient Goals To walk   STG Expiration Date 08/26/20   PT Treatment Day 4   Plan   Treatment/Interventions Functional transfer training;LE strengthening/ROM; Therapeutic exercise; Endurance training;Bed mobility;Gait training;Spoke to nursing   Progress Progressing toward goals   PT Frequency   (3-5x a week)   Recommendation   PT Discharge Recommendation Post-Acute Rehabilitation Services   Equipment Recommended Walker  (RW)   PT - OK to Discharge Yes     Rashida Ponce, PTA

## 2020-08-21 NOTE — PLAN OF CARE
Problem: PHYSICAL THERAPY ADULT  Goal: Performs mobility at highest level of function for planned discharge setting  See evaluation for individualized goals  Description: Treatment/Interventions: Functional transfer training, LE strengthening/ROM, Therapeutic exercise, Endurance training, Bed mobility, Gait training, Spoke to nursing  Equipment Recommended: Walker(RW)       See flowsheet documentation for full assessment, interventions and recommendations  Outcome: Progressing  Note: Prognosis: Good  Problem List: Decreased strength, Decreased endurance, Impaired balance, Decreased mobility, Decreased safety awareness  Assessment: Patient agreeable to participate in therapy  She was able to stand with min A from low surface and  RW without LOB  She was able to ambulate near household distances with min A, noted muscle fatigue requires standing rest breaks  She states she is too fatigued to trial steps this visit  Patient performed TE as noted  She would continue to benefit from skilled PT to maximize functional independence  Barriers to Discharge: Inaccessible home environment, Decreased caregiver support     PT Discharge Recommendation: [address]     PT - OK to Discharge: Yes    See flowsheet documentation for full assessment

## 2020-08-21 NOTE — SOCIAL WORK
Son provided new snf choices of [facility], [facility], and [facility]  [facility] referrals were made

## 2020-08-21 NOTE — SOCIAL WORK
Call received from [facility], [phone]  They have no beds available but will call CM on Monday  They would accept pending bed availability and insurance approval   [place] and spoke to [facility]  They have no available beds  [facility], [phone] and spoke to Bessie Ansari but they have no available beds  [address] [place] [address], [phone], ext [phone] and left message requesting call back  Called son to discuss barriers to dc  A post acute care recommendation was made by your care team for [facility]  Discussed Freedom of Choice with POA  List of facilities given to POA via emailed  POA aware the list is custom filtered for them by insurance and that [facility]'s post acute providers are designated  Call received from [facility] and they do not participate with patient's insurance

## 2020-08-21 NOTE — PROGRESS NOTES
Progress Note - Courtney Eubanks 77 y o  female MRN: [id number]    Unit/Bed#: [facility] 215-54 Encounter: [id number]      Assessment:  Zelda duenas 77 y  o  female w/ incarcerated parastomal hernia s/p ex-lap, reduction and parastomal hernia repair with Phasix mesh, LUANN secondary to obstructive uropathy    Afebrile  Vss  Abdomen soft/ nontender/ non distended  Dressing dry and intact  Plan:  Continue diet  Ambulate  Dressing change today  dispo planning, follow up placement  Subjective:   No acute events  Denied fever, chills, chest pain, shortness of breath, nausea, vomiting, or abdominal pain this morning  Objective:     Vitals: Blood pressure 122/69, pulse 67, temperature 98 4 °F (36 9 °C), temperature source Oral, resp  rate 20, height 5' (1 524 m), weight 89 4 kg (197 lb 1 5 oz), SpO2 96 %, not currently breastfeeding  ,Body mass index is 38 49 kg/m²  Intake/Output Summary (Last 24 hours) at 8/21/2020 1201  Last data filed at 8/20/2020 2230  Gross per 24 hour   Intake 840 ml   Output 845 ml   Net -5 ml     Physical Exam  General: NAD  HEENT: NC/AT  MMM  Cv: RRR     Lungs: normal effort  Ab: Soft, NT/ND  Ex: no CCE  Neuro: AAOx3    Scheduled Meds:  Current Facility-Administered Medications   Medication Dose Route Frequency Provider Last Rate    apixaban  2 5 mg Oral BID Governor Jimmie MD      benzocaine  2 spray Mucosal Once Sayda Land MD      cephalexin  500 mg Oral [facility] Al Walters MD      citalopram  20 mg Oral Daily Maida Jarrell      hydrALAZINE  10 mg Intravenous Q6H PRN Governor Jimmie MD      HYDROmorphone  0 5 mg Intravenous Q3H PRN Cassandra Araiza MD      Labetalol HCl  10 mg Intravenous Q4H PRN Governor Jimmie MD      levothyroxine  75 mcg Oral Early Morning Heriberto Harvey PA-C      melatonin  3 mg Oral HS Heriberto Harvey PA-C      metoprolol tartrate  25 mg Oral Q12H [address] Leonel Pepper,       ondansetron  4 mg Intravenous Q4H PRN MD Ace Christiansen oxyCODONE  2 5 mg Oral Q4H PRN Mark Church PA-C      oxyCODONE  5 mg Oral Q4H PRN Mark Church PA-C      simethicone  80 mg Oral Q6H PRN VICTORINA Houser      vancomycin  125 mg Oral Q12H [address] Brit [place], MD       Continuous Infusions:   PRN Meds: hydrALAZINE    HYDROmorphone    Labetalol HCl    ondansetron    oxyCODONE    oxyCODONE    simethicone      Invasive Devices     Peripherally Inserted Central Catheter Line            PICC Line 08/14/20 Right Other (Comment) 6 days          Drain            Urostomy Ileal conduit RUQ 1416 days                Lab, Imaging and other studies: I have personally reviewed pertinent reports      VTE Pharmacologic Prophylaxis: Heparin  VTE Mechanical Prophylaxis: sequential compression device

## 2020-08-21 NOTE — TELEPHONE ENCOUNTER
Pt is still inpt at this time  She is a pt of Dr Misa Crook in [place]  Please assist with scheduling follow once pt is discharged

## 2020-08-21 NOTE — CASE MANAGEMENT
This writer spoke to patient's brother and daughter  Information on TCF services and visitation policy provided  All questions regarding TCF provided

## 2020-08-21 NOTE — QUICK NOTE
Nurse-Patient-Provider rounds were completed with the patient's nurse today  We discussed the plan is to   - daily wound care by surgery team   I repacked the midline incision with 1/2 plain packing gauze today and applied alginate to drain site  Abdominal erythema nearly resolved  - rehab placement underway    We reviewed all of the invasive devices/lines/telemetry orders   - PICC due to no peripheral access    DVT prophylaxis:  - Eliquis    Diet:  - house    Pain Assessment / Plan:  - Continue current pain management regimen    Mobility Assessment / Plan:  - Activity as tolerated  Goals / Barriers for discharge:  - rehab placement      Case management following; case and discharge needs discussed  All questions and concerns were addressed        Hasmukh Martinez PA-C

## 2020-08-21 NOTE — SOCIAL WORK
Called son, Petra Kiser, to review snf referrals [id number] cannot accept due to nonparticipating with insurance plan  [facility] has no beds until Monday so will review referral  Ivan Leonardo is reviewing the referral   Request was made to therapy to see patient Sunday or Monday am  Patient will need insurance auth for snf admission

## 2020-08-22 LAB
ANION GAP SERPL CALCULATED.3IONS-SCNC: 8 MMOL/L (ref 4–13)
BUN SERPL-MCNC: 29 MG/DL (ref 5–25)
CALCIUM SERPL-MCNC: 7.7 MG/DL (ref 8.3–10.1)
CHLORIDE SERPL-SCNC: 109 MMOL/L (ref 100–108)
CO2 SERPL-SCNC: 22 MMOL/L (ref 21–32)
CREAT SERPL-MCNC: 3.14 MG/DL (ref 0.6–1.3)
ERYTHROCYTE [DISTWIDTH] IN BLOOD BY AUTOMATED COUNT: 14.2 % (ref 11.6–15.1)
GFR SERPL CREATININE-BSD FRML MDRD: 14 ML/MIN/1.73SQ M
GLUCOSE SERPL-MCNC: 105 MG/DL (ref 65–140)
HCT VFR BLD AUTO: 25.4 % (ref 34.8–46.1)
HGB BLD-MCNC: 7.8 G/DL (ref 11.5–15.4)
MCH RBC QN AUTO: 29.9 PG (ref 26.8–34.3)
MCHC RBC AUTO-ENTMCNC: 30.7 G/DL (ref 31.4–37.4)
MCV RBC AUTO: 97 FL (ref 82–98)
PLATELET # BLD AUTO: 222 THOUSANDS/UL (ref 149–390)
PMV BLD AUTO: 10.9 FL (ref 8.9–12.7)
POTASSIUM SERPL-SCNC: 3.8 MMOL/L (ref 3.5–5.3)
RBC # BLD AUTO: 2.61 MILLION/UL (ref 3.81–5.12)
SODIUM SERPL-SCNC: 139 MMOL/L (ref 136–145)
WBC # BLD AUTO: 8.31 THOUSAND/UL (ref 4.31–10.16)

## 2020-08-22 PROCEDURE — 99024 POSTOP FOLLOW-UP VISIT: CPT | Performed by: SURGERY

## 2020-08-22 PROCEDURE — 85027 COMPLETE CBC AUTOMATED: CPT | Performed by: SURGERY

## 2020-08-22 PROCEDURE — 80048 BASIC METABOLIC PNL TOTAL CA: CPT | Performed by: SURGERY

## 2020-08-22 RX ADMIN — MELATONIN 3 MG: at 21:13

## 2020-08-22 RX ADMIN — OXYCODONE HYDROCHLORIDE 5 MG: 5 TABLET ORAL at 19:27

## 2020-08-22 RX ADMIN — APIXABAN 2.5 MG: 2.5 TABLET, FILM COATED ORAL at 17:06

## 2020-08-22 RX ADMIN — APIXABAN 2.5 MG: 2.5 TABLET, FILM COATED ORAL at 08:43

## 2020-08-22 RX ADMIN — METOPROLOL TARTRATE 25 MG: 25 TABLET, FILM COATED ORAL at 21:13

## 2020-08-22 RX ADMIN — Medication 125 MG: at 21:13

## 2020-08-22 RX ADMIN — CEPHALEXIN 500 MG: 500 CAPSULE ORAL at 05:15

## 2020-08-22 RX ADMIN — Medication 125 MG: at 08:43

## 2020-08-22 RX ADMIN — OXYCODONE HYDROCHLORIDE 2.5 MG: 5 TABLET ORAL at 00:02

## 2020-08-22 RX ADMIN — LEVOTHYROXINE SODIUM 75 MCG: 75 TABLET ORAL at 05:15

## 2020-08-22 RX ADMIN — CEPHALEXIN 500 MG: 500 CAPSULE ORAL at 21:14

## 2020-08-22 RX ADMIN — CEPHALEXIN 500 MG: 500 CAPSULE ORAL at 13:38

## 2020-08-22 RX ADMIN — CITALOPRAM HYDROBROMIDE 20 MG: 20 TABLET ORAL at 08:43

## 2020-08-22 NOTE — PROGRESS NOTES
Progress Note - Haresh De Paz 77 y o  female MRN: [id number]    Unit/Bed#: [facility] 774-08 Encounter: [id number]      Assessment:  Sintia Cage a 77 y  o  female w/ incarcerated parastomal hernia s/p ex-lap, reduction and parastomal hernia repair with Phasix mesh, LUANN secondary to obstructive uropathy    Afebrile  Vss  Abdomen soft/ nontender/ non distended  Packing was changed for midline, serous discharge  Cre increased 3 14 from 2 72    Plan:  Continue diet  Ambulate  Dressing changed today  dispo planning, follow up placement  Subjective:   No acute events  Denied fever, chills, chest pain, shortness of breath, nausea, vomiting, or abdominal pain this morning  Objective:     Vitals: Blood pressure 121/69, pulse 96, temperature 97 6 °F (36 4 °C), temperature source Oral, resp  rate 18, height 5' (1 524 m), weight 89 4 kg (197 lb 1 5 oz), SpO2 96 %, not currently breastfeeding  ,Body mass index is 38 49 kg/m²  Intake/Output Summary (Last 24 hours) at 8/22/2020 0819  Last data filed at 8/21/2020 2101  Gross per 24 hour   Intake 500 ml   Output 575 ml   Net -75 ml     Physical Exam  General: NAD  HEENT: NC/AT  MMM  Cv: RRR     Lungs: normal effort  Ab: Soft, NT/ND, small open wound in midline, serous discharge  Ex: no CCE  Neuro: AAOx3    Scheduled Meds:  Current Facility-Administered Medications   Medication Dose Route Frequency Provider Last Rate    apixaban  2 5 mg Oral BID Kenny Vega MD      benzocaine  2 spray Mucosal Once Donya Fraire MD      cephalexin  500 mg Oral [facility] Timothy Edge MD      citalopram  20 mg Oral Daily [place], [place]      hydrALAZINE  10 mg Intravenous Q6H PRN Kenny Vega MD      HYDROmorphone  0 5 mg Intravenous Q3H PRN Vane Benjamin MD      Labetalol HCl  10 mg Intravenous Q4H PRN Kenny Vega MD      levothyroxine  75 mcg Oral Early Morning [facility]      melatonin  3 mg Oral HS [facility]      metoprolol tartrate  25 mg Oral Q12H [facility] Dallas Spencer DO      ondansetron  4 mg Intravenous Q4H PRN [place] Duane, MD      oxyCODONE  2 5 mg Oral Q4H PRN Elida Wood PA-C      oxyCODONE  5 mg Oral Q4H PRN Elida Wood PA-C      simethicone  80 mg Oral Q6H PRN VICTORINA Diane      vancomycin  125 mg Oral Q12H [facility] Wilton Apley, MD       Continuous Infusions:   PRN Meds: hydrALAZINE    HYDROmorphone    Labetalol HCl    ondansetron    oxyCODONE    oxyCODONE    simethicone      Invasive Devices     Peripherally Inserted Central Catheter Line            PICC Line 08/14/20 Right Other (Comment) 7 days          Drain            Urostomy Ileal conduit RUQ 1417 days                Lab, Imaging and other studies: I have personally reviewed pertinent reports      VTE Pharmacologic Prophylaxis: Heparin  VTE Mechanical Prophylaxis: sequential compression device

## 2020-08-22 NOTE — PLAN OF CARE
Problem: Potential for Falls  Goal: Patient will remain free of falls  Description: INTERVENTIONS:  - Assess patient frequently for physical needs  -  Identify cognitive and physical deficits and behaviors that affect risk of falls    -  [place] fall precautions as indicated by assessment   - Educate patient/family on patient safety including physical limitations  - Instruct patient to call for assistance with activity based on assessment  - Modify environment to reduce risk of injury  - Consider OT/PT consult to assist with strengthening/mobility  Outcome: Progressing

## 2020-08-23 LAB
ANION GAP SERPL CALCULATED.3IONS-SCNC: 9 MMOL/L (ref 4–13)
ANISOCYTOSIS BLD QL SMEAR: PRESENT
BASOPHILS # BLD MANUAL: 0 THOUSAND/UL (ref 0–0.1)
BASOPHILS NFR MAR MANUAL: 0 % (ref 0–1)
BUN SERPL-MCNC: 35 MG/DL (ref 5–25)
CALCIUM SERPL-MCNC: 7.9 MG/DL (ref 8.3–10.1)
CHLORIDE SERPL-SCNC: 109 MMOL/L (ref 100–108)
CO2 SERPL-SCNC: 21 MMOL/L (ref 21–32)
CREAT SERPL-MCNC: 3.23 MG/DL (ref 0.6–1.3)
EOSINOPHIL # BLD MANUAL: 0.08 THOUSAND/UL (ref 0–0.4)
EOSINOPHIL NFR BLD MANUAL: 1 % (ref 0–6)
ERYTHROCYTE [DISTWIDTH] IN BLOOD BY AUTOMATED COUNT: 14.3 % (ref 11.6–15.1)
GFR SERPL CREATININE-BSD FRML MDRD: 13 ML/MIN/1.73SQ M
GLUCOSE SERPL-MCNC: 109 MG/DL (ref 65–140)
HCT VFR BLD AUTO: 24.1 % (ref 34.8–46.1)
HGB BLD-MCNC: 7.4 G/DL (ref 11.5–15.4)
LYMPHOCYTES # BLD AUTO: 0.41 THOUSAND/UL (ref 0.6–4.47)
LYMPHOCYTES # BLD AUTO: 5 % (ref 14–44)
MCH RBC QN AUTO: 29.7 PG (ref 26.8–34.3)
MCHC RBC AUTO-ENTMCNC: 30.7 G/DL (ref 31.4–37.4)
MCV RBC AUTO: 97 FL (ref 82–98)
METAMYELOCYTES NFR BLD MANUAL: 1 % (ref 0–1)
MONOCYTES # BLD AUTO: 0.5 THOUSAND/UL (ref 0–1.22)
MONOCYTES NFR BLD: 6 % (ref 4–12)
MYELOCYTES NFR BLD MANUAL: 2 % (ref 0–1)
NEUTROPHILS # BLD MANUAL: 7.01 THOUSAND/UL (ref 1.85–7.62)
NEUTS BAND NFR BLD MANUAL: 1 % (ref 0–8)
NEUTS SEG NFR BLD AUTO: 84 % (ref 43–75)
NRBC BLD AUTO-RTO: 0 /100 WBCS
PLATELET # BLD AUTO: 202 THOUSANDS/UL (ref 149–390)
PLATELET BLD QL SMEAR: ADEQUATE
PMV BLD AUTO: 10.7 FL (ref 8.9–12.7)
POTASSIUM SERPL-SCNC: 4 MMOL/L (ref 3.5–5.3)
RBC # BLD AUTO: 2.49 MILLION/UL (ref 3.81–5.12)
RBC MORPH BLD: PRESENT
SODIUM SERPL-SCNC: 139 MMOL/L (ref 136–145)
WBC # BLD AUTO: 8.25 THOUSAND/UL (ref 4.31–10.16)

## 2020-08-23 PROCEDURE — 80048 BASIC METABOLIC PNL TOTAL CA: CPT | Performed by: SURGERY

## 2020-08-23 PROCEDURE — 85007 BL SMEAR W/DIFF WBC COUNT: CPT | Performed by: SURGERY

## 2020-08-23 PROCEDURE — 99024 POSTOP FOLLOW-UP VISIT: CPT | Performed by: SURGERY

## 2020-08-23 PROCEDURE — 85027 COMPLETE CBC AUTOMATED: CPT | Performed by: SURGERY

## 2020-08-23 RX ADMIN — APIXABAN 2.5 MG: 2.5 TABLET, FILM COATED ORAL at 08:30

## 2020-08-23 RX ADMIN — RUXOLITINIB 10 MG: 10 TABLET ORAL at 14:01

## 2020-08-23 RX ADMIN — CEPHALEXIN 500 MG: 500 CAPSULE ORAL at 13:54

## 2020-08-23 RX ADMIN — OXYCODONE HYDROCHLORIDE 2.5 MG: 5 TABLET ORAL at 18:35

## 2020-08-23 RX ADMIN — CEPHALEXIN 500 MG: 500 CAPSULE ORAL at 21:00

## 2020-08-23 RX ADMIN — APIXABAN 2.5 MG: 2.5 TABLET, FILM COATED ORAL at 18:35

## 2020-08-23 RX ADMIN — CITALOPRAM HYDROBROMIDE 20 MG: 20 TABLET ORAL at 08:30

## 2020-08-23 RX ADMIN — Medication 125 MG: at 08:35

## 2020-08-23 RX ADMIN — CEPHALEXIN 500 MG: 500 CAPSULE ORAL at 05:13

## 2020-08-23 RX ADMIN — METOPROLOL TARTRATE 25 MG: 25 TABLET, FILM COATED ORAL at 20:59

## 2020-08-23 RX ADMIN — MELATONIN 3 MG: at 21:00

## 2020-08-23 RX ADMIN — METOPROLOL TARTRATE 25 MG: 25 TABLET, FILM COATED ORAL at 08:30

## 2020-08-23 RX ADMIN — LEVOTHYROXINE SODIUM 75 MCG: 75 TABLET ORAL at 05:13

## 2020-08-23 RX ADMIN — Medication 125 MG: at 21:47

## 2020-08-23 NOTE — PROGRESS NOTES
Progress Note - Andrea Kong 77 y o  female MRN: [id number]    Unit/Bed#: [facility] 132-46 Encounter: [id number]      Assessment:  Shanda duenas 77 y  o  female w/ incarcerated parastomal hernia s/p ex-lap, reduction and parastomal hernia repair with Phasix mesh, LUANN secondary to obstructive uropathy        Plan:  Continue diet  Ambulate  Dressing changed today  dispo planning, follow up placement  Urine output is adequate, f/u Cr      Subjective:   No acute events  Denied fever, chills, chest pain, shortness of breath, nausea, vomiting, or abdominal pain this morning  Objective:     Vitals: Blood pressure 119/72, pulse 87, temperature 98 1 °F (36 7 °C), temperature source Oral, resp  rate 18, height 5' (1 524 m), weight 89 4 kg (197 lb 1 5 oz), SpO2 95 %, not currently breastfeeding  ,Body mass index is 38 49 kg/m²  Intake/Output Summary (Last 24 hours) at 8/23/2020 0556  Last data filed at 8/23/2020 0525  Gross per 24 hour   Intake 616 ml   Output 1300 ml   Net -684 ml     Physical Exam  General: NAD  HEENT: NC/AT  MMM  Cv: RRR     Lungs: normal effort  Ab: Soft, NT/ND, small open wound in midline, serous discharge, packing changed  Ex: no CCE  Neuro: AAOx3    Scheduled Meds:  Current Facility-Administered Medications   Medication Dose Route Frequency Provider Last Rate    apixaban  2 5 mg Oral BID Rosamaria Still MD      benzocaine  2 spray Mucosal Once Cinthya Ma MD      cephalexin  500 mg Oral [facility] Wilton Apley, MD      citalopram  20 mg Oral Daily Elida Wood, [place]      hydrALAZINE  10 mg Intravenous Q6H PRN Rosamaria Still MD      HYDROmorphone  0 5 mg Intravenous Q3H PRN [place]field Duane, MD      Labetalol HCl  10 mg Intravenous Q4H PRN Rosamaria Still MD      levothyroxine  75 mcg Oral Early Morning Elida Wood PA-C      melatonin  3 mg Oral HS Elida Wood PA-C      metoprolol tartrate  25 mg Oral Q12H [address] [place] Spencer, DO      ondansetron  4 mg Intravenous Q4H PRN Claudene Lyme, MD      oxyCODONE  2 5 mg Oral Q4H PRN Lori Gan PA-C      oxyCODONE  5 mg Oral Q4H PRN Lori Gan PA-C      simethicone  80 mg Oral Q6H PRN VICTORINA Andrade      vancomycin  125 mg Oral Q12H [address] Mami Emmanuel MD       Continuous Infusions:   PRN Meds: hydrALAZINE    HYDROmorphone    Labetalol HCl    ondansetron    oxyCODONE    oxyCODONE    simethicone      Invasive Devices     Peripherally Inserted Central Catheter Line            PICC Line 08/14/20 Right Other (Comment) 8 days          Drain            Urostomy Ileal conduit RUQ 1418 days                Lab, Imaging and other studies: I have personally reviewed pertinent reports      VTE Pharmacologic Prophylaxis: Heparin  VTE Mechanical Prophylaxis: sequential compression device

## 2020-08-24 ENCOUNTER — HOSPITAL ENCOUNTER (INPATIENT)
Facility: HOSPITAL | Age: 75
LOS: 15 days | Discharge: NURSING, REHAB OR LONG-TERM CARE | DRG: 949 | End: 2020-09-08
Attending: INTERNAL MEDICINE | Admitting: INTERNAL MEDICINE
Payer: COMMERCIAL

## 2020-08-24 VITALS
SYSTOLIC BLOOD PRESSURE: 119 MMHG | HEIGHT: 60 IN | WEIGHT: 197.09 LBS | TEMPERATURE: 97.7 F | HEART RATE: 73 BPM | OXYGEN SATURATION: 99 % | RESPIRATION RATE: 18 BRPM | BODY MASS INDEX: 38.69 KG/M2 | DIASTOLIC BLOOD PRESSURE: 73 MMHG

## 2020-08-24 DIAGNOSIS — Z48.815 ENCOUNTER FOR SURGICAL AFTERCARE FOLLOWING SURGERY ON THE DIGESTIVE SYSTEM: Primary | ICD-10-CM

## 2020-08-24 LAB
ANION GAP SERPL CALCULATED.3IONS-SCNC: 8 MMOL/L (ref 4–13)
BUN SERPL-MCNC: 38 MG/DL (ref 5–25)
CALCIUM SERPL-MCNC: 8.1 MG/DL (ref 8.3–10.1)
CHLORIDE SERPL-SCNC: 107 MMOL/L (ref 100–108)
CO2 SERPL-SCNC: 22 MMOL/L (ref 21–32)
CREAT SERPL-MCNC: 3.1 MG/DL (ref 0.6–1.3)
GFR SERPL CREATININE-BSD FRML MDRD: 14 ML/MIN/1.73SQ M
GLUCOSE SERPL-MCNC: 91 MG/DL (ref 65–140)
POTASSIUM SERPL-SCNC: 4.1 MMOL/L (ref 3.5–5.3)
SARS-COV-2 RNA RESP QL NAA+PROBE: NEGATIVE
SODIUM SERPL-SCNC: 137 MMOL/L (ref 136–145)

## 2020-08-24 PROCEDURE — 80048 BASIC METABOLIC PNL TOTAL CA: CPT | Performed by: SURGERY

## 2020-08-24 PROCEDURE — 97110 THERAPEUTIC EXERCISES: CPT

## 2020-08-24 PROCEDURE — NC001 PR NO CHARGE: Performed by: SURGERY

## 2020-08-24 PROCEDURE — 97535 SELF CARE MNGMENT TRAINING: CPT

## 2020-08-24 PROCEDURE — U0003 INFECTIOUS AGENT DETECTION BY NUCLEIC ACID (DNA OR RNA); SEVERE ACUTE RESPIRATORY SYNDROME CORONAVIRUS 2 (SARS-COV-2) (CORONAVIRUS DISEASE [COVID-19]), AMPLIFIED PROBE TECHNIQUE, MAKING USE OF HIGH THROUGHPUT TECHNOLOGIES AS DESCRIBED BY CMS-2020-01-R: HCPCS | Performed by: PHYSICIAN ASSISTANT

## 2020-08-24 PROCEDURE — 99024 POSTOP FOLLOW-UP VISIT: CPT | Performed by: SURGERY

## 2020-08-24 PROCEDURE — 97116 GAIT TRAINING THERAPY: CPT

## 2020-08-24 RX ORDER — LANOLIN ALCOHOL/MO/W.PET/CERES
3 CREAM (GRAM) TOPICAL
Status: DISCONTINUED | OUTPATIENT
Start: 2020-08-24 | End: 2020-09-08 | Stop reason: HOSPADM

## 2020-08-24 RX ORDER — HYDROMORPHONE HCL/PF 1 MG/ML
0.5 SYRINGE (ML) INJECTION
Status: DISCONTINUED | OUTPATIENT
Start: 2020-08-24 | End: 2020-08-24

## 2020-08-24 RX ORDER — SIMETHICONE 80 MG
80 TABLET,CHEWABLE ORAL EVERY 6 HOURS PRN
Status: DISCONTINUED | OUTPATIENT
Start: 2020-08-24 | End: 2020-09-08 | Stop reason: HOSPADM

## 2020-08-24 RX ORDER — LEVOTHYROXINE SODIUM 0.07 MG/1
75 TABLET ORAL
Status: DISCONTINUED | OUTPATIENT
Start: 2020-08-25 | End: 2020-09-08 | Stop reason: HOSPADM

## 2020-08-24 RX ORDER — LEVOTHYROXINE SODIUM 0.07 MG/1
75 TABLET ORAL
Status: CANCELLED | OUTPATIENT
Start: 2020-08-25

## 2020-08-24 RX ORDER — LABETALOL 20 MG/4 ML (5 MG/ML) INTRAVENOUS SYRINGE
10 EVERY 4 HOURS PRN
Status: CANCELLED | OUTPATIENT
Start: 2020-08-24

## 2020-08-24 RX ORDER — HYDROMORPHONE HCL/PF 1 MG/ML
0.5 SYRINGE (ML) INJECTION
Status: CANCELLED | OUTPATIENT
Start: 2020-08-24

## 2020-08-24 RX ORDER — OXYCODONE HYDROCHLORIDE 5 MG/1
5 TABLET ORAL EVERY 4 HOURS PRN
Status: CANCELLED | OUTPATIENT
Start: 2020-08-24

## 2020-08-24 RX ORDER — ONDANSETRON 2 MG/ML
4 INJECTION INTRAMUSCULAR; INTRAVENOUS EVERY 4 HOURS PRN
Status: DISCONTINUED | OUTPATIENT
Start: 2020-08-24 | End: 2020-08-25

## 2020-08-24 RX ORDER — LABETALOL 20 MG/4 ML (5 MG/ML) INTRAVENOUS SYRINGE
10 EVERY 4 HOURS PRN
Status: DISCONTINUED | OUTPATIENT
Start: 2020-08-24 | End: 2020-08-24

## 2020-08-24 RX ORDER — LANOLIN ALCOHOL/MO/W.PET/CERES
3 CREAM (GRAM) TOPICAL
Status: CANCELLED | OUTPATIENT
Start: 2020-08-24

## 2020-08-24 RX ORDER — OXYCODONE HYDROCHLORIDE 5 MG/1
2.5 TABLET ORAL EVERY 4 HOURS PRN
Status: DISCONTINUED | OUTPATIENT
Start: 2020-08-24 | End: 2020-09-08 | Stop reason: HOSPADM

## 2020-08-24 RX ORDER — HYDRALAZINE HYDROCHLORIDE 20 MG/ML
10 INJECTION INTRAMUSCULAR; INTRAVENOUS EVERY 6 HOURS PRN
Status: CANCELLED | OUTPATIENT
Start: 2020-08-24

## 2020-08-24 RX ORDER — OXYCODONE HYDROCHLORIDE 5 MG/1
5 TABLET ORAL EVERY 4 HOURS PRN
Status: DISCONTINUED | OUTPATIENT
Start: 2020-08-24 | End: 2020-09-08 | Stop reason: HOSPADM

## 2020-08-24 RX ORDER — OXYCODONE HYDROCHLORIDE 5 MG/1
2.5 TABLET ORAL EVERY 4 HOURS PRN
Status: CANCELLED | OUTPATIENT
Start: 2020-08-24

## 2020-08-24 RX ORDER — HYDRALAZINE HYDROCHLORIDE 20 MG/ML
10 INJECTION INTRAMUSCULAR; INTRAVENOUS EVERY 6 HOURS PRN
Status: DISCONTINUED | OUTPATIENT
Start: 2020-08-24 | End: 2020-08-24

## 2020-08-24 RX ORDER — ONDANSETRON 2 MG/ML
4 INJECTION INTRAMUSCULAR; INTRAVENOUS EVERY 4 HOURS PRN
Status: CANCELLED | OUTPATIENT
Start: 2020-08-24

## 2020-08-24 RX ORDER — CITALOPRAM 20 MG/1
20 TABLET ORAL DAILY
Status: DISCONTINUED | OUTPATIENT
Start: 2020-08-25 | End: 2020-09-08 | Stop reason: HOSPADM

## 2020-08-24 RX ORDER — CITALOPRAM 20 MG/1
20 TABLET ORAL DAILY
Status: CANCELLED | OUTPATIENT
Start: 2020-08-25

## 2020-08-24 RX ORDER — SIMETHICONE 80 MG
80 TABLET,CHEWABLE ORAL EVERY 6 HOURS PRN
Status: CANCELLED | OUTPATIENT
Start: 2020-08-24

## 2020-08-24 RX ADMIN — RUXOLITINIB 10 MG: 10 TABLET ORAL at 09:21

## 2020-08-24 RX ADMIN — TUBERCULIN PURIFIED PROTEIN DERIVATIVE 5 UNITS: 5 INJECTION, SOLUTION INTRADERMAL at 21:06

## 2020-08-24 RX ADMIN — CEPHALEXIN 500 MG: 500 CAPSULE ORAL at 05:16

## 2020-08-24 RX ADMIN — METOPROLOL TARTRATE 25 MG: 25 TABLET, FILM COATED ORAL at 09:18

## 2020-08-24 RX ADMIN — APIXABAN 2.5 MG: 2.5 TABLET, FILM COATED ORAL at 09:18

## 2020-08-24 RX ADMIN — LEVOTHYROXINE SODIUM 75 MCG: 75 TABLET ORAL at 05:01

## 2020-08-24 RX ADMIN — Medication 125 MG: at 09:18

## 2020-08-24 RX ADMIN — METOPROLOL TARTRATE 25 MG: 50 TABLET, FILM COATED ORAL at 21:04

## 2020-08-24 RX ADMIN — Medication 125 MG: at 21:03

## 2020-08-24 RX ADMIN — APIXABAN 2.5 MG: 2.5 TABLET, FILM COATED ORAL at 17:10

## 2020-08-24 RX ADMIN — MELATONIN TAB 3 MG 3 MG: 3 TAB at 21:04

## 2020-08-24 RX ADMIN — CITALOPRAM HYDROBROMIDE 20 MG: 20 TABLET ORAL at 09:18

## 2020-08-24 NOTE — SOCIAL WORK
Cm received call from Nesha Garcia [facility] approved for admission to [facility]  Auth [id number] effect 8/24

## 2020-08-24 NOTE — WOUND OSTOMY CARE
Progress Note - Wound   Anaya Fowler 77 y o  female MRN: [id number]  [facility] Encounter: [id number]        Assessment:   Mireya seen this morning for continued skin and wound care  She is awake, alert, out of bed in chair complaining of leaking urostomy pouch  Per patient she prefers to use her own supplies from home, however is agreeable for wound care nurse to assess and provide ostomy and skin care  Findings  1-shingle rash to R abdomen now fully resolved  2-Mid abdominal incision remains open right around umbilicus with packing, being managed by surgical team and nursing  3-Urostomy care as routine nursing care  Stoma is well established, peristomal skin intact  Pouches ordered and placed in room  4-LLQ with small surgical wound, cover with alginate and DSD, change daily  Sacrum, buttocks and heels remains intact, patient is continent of bowel and has an established urostomy  Continue with following wound/Skin care plans:  1-Hydraguard to bilateral sacrum, buttock and heels BID and PRN  2-Elevate heels to offload pressure  3-Ehob cushion in chair when out of bed  4-Moisturize skin daily with skin nourishing cream   5-Turn/reposition q2h or when medically stable for pressure re-distribution on skin  Vitals: Blood pressure 109/69, pulse 96, temperature 97 9 °F (36 6 °C), temperature source Oral, resp  rate 18, height 5' (1 524 m), weight 89 4 kg (197 lb 1 5 oz), SpO2 97 %, not currently breastfeeding  ,Body mass index is 38 49 kg/m²  @[place]     Wound 08/11/20 Abdomen Mid (Active)   Wound Description Dry;Clean;Fragile 08/24/20 0505   Fina-wound Assessment Dry;Clean 08/24/20 0505   Closure Unapproximated;Staples 08/24/20 0505   Drainage Amount Moderate 08/24/20 0505   Drainage Description Serosanguineous 08/24/20 0505   Treatments Cleansed;Site care 08/21/20 0900   Dressing Dry dressing;ABD 08/24/20 0505   Wound packed?  Yes 08/24/20 0505   Packing- # removed 1 08/24/20 [id number]   Packing- # inserted 1 08/24/20 0505   Dressing Changed Changed by provider 08/24/20 0505   Patient Tolerance Tolerated well 08/24/20 0505   Dressing Status Clean;Dry; Intact 08/24/20 0505       Wound 08/17/20 Abdomen Left; Lower (Active)   Wound Image   08/24/20 1111   Wound Description Beefy red;Dark edges 08/24/20 1111   Fina-wound Assessment Dry;Clean 08/24/20 0505   Wound Length (cm) 1 3 cm 08/24/20 1111   Wound Width (cm) 2 5 cm 08/24/20 1111   Wound Depth (cm) 0 4 cm 08/24/20 1111   Wound Surface Area (cm^2) 3 25 cm^2 08/24/20 1111   Wound Volume (cm^3) 1 3 cm^3 08/24/20 1111   Calculated Wound Volume (cm^3) 1 3 cm^3 08/24/20 1111   Closure Unapproximated 08/24/20 0505   Drainage Amount Scant 08/24/20 0505   Drainage Description Serosanguineous 08/24/20 0505   Non-staged Wound Description Full thickness 08/24/20 1111   Treatments Site care 08/23/20 1200   Dressing Calcium Alginate 08/24/20 1111   Dressing Changed New 08/24/20 1111   Patient Tolerance Tolerated well 08/24/20 1111   Dressing Status Clean;Dry; Intact 08/24/20 0505         Patient has not other wounds needing continued inpatient wound care nurse to follow, wound care is signing off, please call ext [phone] with questions or concerns          Austin High, RN, BSN, Yan Carmichael

## 2020-08-24 NOTE — PLAN OF CARE
Problem: OCCUPATIONAL THERAPY ADULT  Goal: Performs self-care activities at highest level of function for planned discharge setting  See evaluation for individualized goals  Description: Treatment Interventions: ADL retraining, Functional transfer training, UE strengthening/ROM, Endurance training, Cognitive reorientation, Patient/family training, Equipment evaluation/education, Compensatory technique education, Activityengagement, Energy conservation          See flowsheet documentation for full assessment, interventions and recommendations  Outcome: Progressing  Note: Limitation: Decreased ADL status, Decreased UE strength, Decreased Safe judgement during ADL, Decreased cognition, Decreased endurance, Decreased high-level ADLs  Prognosis: Fair  Assessment: Pt participated in OT treatment session today to address barriers to occupational performance  Interventions focused on ADL retraining, functional transfers, and functional mobility  Pt agreeable to OT treatment today  Upon OT arrival, Pt was found OOB in chair  Pt participated in toileting, LB dressing, long distance functional mobility, toilet transfer, and STS transfers - refer to chart for A levels  Pt requires curing and encouragement throughout session  In comparison to last session, Pt is progressing with treatment goals but is limited by fatigue  Pt educated on safety awareness, pacing through activities, fall prevention, and importance of OT treatment session  Spoke to Pt about D/C plan- Pt agreeable with good insight into deficits and lack of social support at home  Educated Pt on benefits of post-acute rehab following D/C, and Pt agreeable  Pt verbalized understanding  Pt will benefit from continued OT treatment t/o hospital stay to address limitations to occupational performance as defined above to maximize functional independence   Recommend post-acute rehab following d/c       OT Discharge Recommendation: Post-Acute Rehabilitation Services  OT - OK to Discharge: Yes(when medically cleared)

## 2020-08-24 NOTE — QUICK NOTE
Requested by CM to document plan for patient's anemia  Hgb trend for past 5 days has been stable  Likely component of chronic anemia and iatrogenic w/ serial lab draws while inpatient  No transfusion at this time

## 2020-08-24 NOTE — PHYSICAL THERAPY NOTE
PHYSICAL THERAPY NOTE  Patient Name: Leatha Tillman  SWSSR'W Date: 8/24/2020 08/24/20 0910   Pain Assessment   Pain Assessment Tool 0-10   Pain Score No Pain   Restrictions/Precautions   Weight Bearing Precautions Per Order No   Other Precautions Contact/isolation; Fall Risk;Multiple lines   General   Chart Reviewed Yes   Response to Previous Treatment Patient with no complaints from previous session  Family/Caregiver Present No   Cognition   Overall Cognitive Status WFL   Arousal/Participation Alert; Responsive; Cooperative   Attention Within functional limits   Orientation Level Oriented X4   Memory Decreased recall of precautions   Following Commands Follows multistep commands without difficulty   Comments Pt very pleasant and cooperative to work w/ therapy   Subjective   Subjective "I've had such a busy morning"   Bed Mobility   Supine to Sit Unable to assess   Sit to Supine Unable to assess   Additional Comments Pt seated OOB upon PT arrival  Pt returned seated OOB at end of session w/ all needs within reach   Transfers   Sit to Stand 4  Minimal assistance   Additional items Assist x 1; Increased time required;Verbal cues   Stand to Sit 5  Supervision   Additional items Verbal cues   Toilet transfer 4  Minimal assistance   Additional items Assist x 1;Standard toilet   Additional Comments Transfers w/ RW  Pt ambulated to bathroom and performed toilet transfer at [address] [place] [address] on standard toilet  Pt required Total A for pericare in standing  Ambulation/Elevation   Gait pattern Excessively slow; Short stride; Inconsistent wisam   Gait Assistance 5  Supervision   Additional items Verbal cues   Assistive Device Rolling walker   Distance 50ft x2   Stair Management Assistance Not tested  (pt declines 2' fatigue)   Balance   Static Sitting Good   Dynamic Sitting Fair +   Static Standing Fair   Dynamic Standing Gregg Harris [id number] - Endurance Deficit   Endurance Deficit Yes   Endurance Deficit Description fatigue, weakness   Activity Tolerance   Activity Tolerance Patient limited by fatigue   Nurse Made Aware yes   Exercises   Hip Abduction Sitting;Bilateral;10 reps   Knee AROM Long Arc Quad Sitting;Bilateral;10 reps   Marching Sitting;Bilateral;10 reps   Assessment   Prognosis Good   Problem List Decreased strength;Decreased endurance; Impaired balance;Decreased mobility   Assessment Pt presents with decreased mobility, strength, balance, and activity tolerance  Pt demonstrated ability to perform STS functional transfers at [address] [place] [address]  Pt ambulated to bathroom and performed toilet transfer at [address] [place] [address]  Pt required Total A for all pericare in standing  Pt ambulated 50ft x2 with RW at supervision  Pt notes significant fatigue following ambulation and defers stair trial this session  Pt performed seated LE therex program  Pt continues to benefit from skilled therapy to maximize functional independence  Recommendation at this time is rehab  Pt would benefit from continued ambulation, functional transfers, strength, balance, and activity tolerance   Barriers to Discharge Inaccessible home environment;Decreased caregiver support   Goals   Patient Goals to rest after her busy morning   STG Expiration Date 08/26/20   PT Treatment Day 5   Plan   Treatment/Interventions Functional transfer training;LE strengthening/ROM; Therapeutic exercise; Endurance training;Patient/family training;Equipment eval/education;Gait training;Spoke to nursing   Progress Progressing toward goals   PT Frequency   (3-5x/week)   Recommendation   PT Discharge Recommendation Post-Acute Rehabilitation Services   Equipment Recommended Walker   PT - OK to Discharge Yes  (when medically cleared to rehab)     Barbra Ortega, PT, DPT

## 2020-08-24 NOTE — PLAN OF CARE
Problem: PHYSICAL THERAPY ADULT  Goal: Performs mobility at highest level of function for planned discharge setting  See evaluation for individualized goals  Description: Treatment/Interventions: Functional transfer training, LE strengthening/ROM, Therapeutic exercise, Endurance training, Patient/family training, Equipment eval/education, Gait training, Spoke to nursing  Equipment Recommended: Cm To       See flowsheet documentation for full assessment, interventions and recommendations  Outcome: Progressing  Note: Prognosis: Good  Problem List: Decreased strength, Decreased endurance, Impaired balance, Decreased mobility  Assessment: Pt presents with decreased mobility, strength, balance, and activity tolerance  Pt demonstrated ability to perform STS functional transfers at [address] [place] [address]  Pt ambulated to bathroom and performed toilet transfer at [address] [place] [address]  Pt required Total A for all pericare in standing  Pt ambulated 50ft x2 with RW at supervision  Pt notes significant fatigue following ambulation and defers stair trial this session  Pt performed seated LE therex program  Pt continues to benefit from skilled therapy to maximize functional independence  Recommendation at this time is rehab  Pt would benefit from continued ambulation, functional transfers, strength, balance, and activity tolerance  Barriers to Discharge: Inaccessible home environment, Decreased caregiver support     PT Discharge Recommendation: [id number]     PT - OK to Discharge: Yes(when medically cleared to rehab)    See flowsheet documentation for full assessment

## 2020-08-24 NOTE — PLAN OF CARE
Problem: Potential for Falls  Goal: Patient will remain free of falls  Description: INTERVENTIONS:  - Assess patient frequently for physical needs  -  Identify cognitive and physical deficits and behaviors that affect risk of falls    -  [place] fall precautions as indicated by assessment   - Educate patient/family on patient safety including physical limitations  - Instruct patient to call for assistance with activity based on assessment  - Modify environment to reduce risk of injury  - Consider OT/PT consult to assist with strengthening/mobility  Outcome: Progressing

## 2020-08-24 NOTE — OCCUPATIONAL THERAPY NOTE
OccupationalTherapy Progress Note     Patient Name: Jann Garcia  ILKZF'B Date: 8/24/2020  Problem List  Principal Problem:    Small bowel obstruction ([address] )            08/24/20 1125   Restrictions/Precautions   Weight Bearing Precautions Per Order No   Other Precautions Chair Alarm; Bed Alarm;Contact/isolation; Fall Risk   Lifestyle   Autonomy Pt reports being IND w/ all ADLS and IADLS (son occasionally A w/ chores); (+) drives PTA    Reciprocal Relationships Pt live w/ her son that has Autism and is mostly self-efficiant  Pt reports her other son is currently staying with her son to provide A as needed  Pt reports her son that has Autism is able to provide only limited asisst upon D/C  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   Intrinsic Gratification Pt reports enjoying being IND   Pain Assessment   Pain Assessment Tool Pain Assessment not indicated - pt denies pain   Pain Score No Pain   ADL   Where Assessed Chair   Grooming Assistance 5  Supervision/Setup   Grooming Deficit Setup;Verbal cueing;Brushing hair   Grooming Comments Pt able to comb hair while seated in chair - used B/L UE for ability to reach both sides   UB Dressing Assistance 4  Minimal Assistance   UB Dressing Deficit Setup;Supervision/safety;Pull around back; Fasteners   UB Dressing Comments Doff/don gown  - Pt able to doff w/ supervision and Rahul Chand with assist for fasteners/pull around back   LB Dressing Assistance 3  Moderate Assistance   LB Dressing Deficit Setup;Supervision/safety; Don/doff R sock   LB Dressing Comments Pt able to doff/don L sock with supervision by crossing foot to knee  Unable to reach R foot to doff/don   Toileting Comments denies need at this time   Bed Mobility   Additional Comments Pt seated OOB in chair upon OT arrival  Pt left OOB in recliner chair w/ chair alarm on and all needs in reach following OT session  Transfers   Sit to Stand 5  Supervision   Additional items Assist x 1; Armrests; Verbal cues; Impulsive   Stand to Sit 5  Supervision   Additional items Assist x 1; Armrests; Verbal cues   Additional Comments Transferred w/o AD, however, Pt impulsive upon standing and stood prior to getting RW in place - stood up quickly before prompting from therapist  Susana Olivarez for safety  Cognition   Overall Cognitive Status WFL   Arousal/Participation Alert; Cooperative   Attention Within functional limits   Orientation Level Oriented X4   Memory Decreased recall of precautions   Following Commands Follows one step commands without difficulty   Comments Pt pleasant and cooperative to work with therapy today despite verbalizing fatigue following PT session this AM  Pt able to follow all commands and follow through with therapy tasks as appropriate  Activity Tolerance   Activity Tolerance Patient limited by fatigue   Medical Staff Made Aware OT JOHNATHAN Brody   Assessment   Assessment Pt participated in OT treatment session today to address barriers to occupational performance  Interventions focused on ADL retraining, functional transfers, and activity engagement  Pt agreeable to OT treatment today  Upon OT arrival, Pt was found OOB in chair  Pt participated in grooming, UB dressing tasks, LB dressing tasks, and STS transfer - refer to chart for A levels  Pt requires cueing and encouragement throughout session  In comparison to last session, Pt is progressing with treatment goals but is limited by fatigue  Pt educated on safety awareness, pacing through activities, and importance of activity engagement  Pt verbalized understanding  Pt will benefit from continued OT treatment t/o hospital stay to address limitations to occupational performance as defined above to maximize functional independence  Recommend post-acute rehab following d/c     Plan   Treatment Interventions ADL retraining;Functional transfer training;UE strengthening/ROM; Endurance training;Patient/family training; Compensatory technique education; Energy conservation; Activityengagement Goal Expiration Date 08/27/20   OT Treatment Day 3   OT Frequency 3-5x/wk   Recommendation   OT Discharge Recommendation Post-Acute Rehabilitation Services   OT - OK to Discharge Yes  (when medically cleared)   Modified Polson Scale   Modified Polson Scale [address] [facility], [facility]

## 2020-08-24 NOTE — SOCIAL WORK
The patient is approved at [facility]  Patient is cleared for discharge today by [facility]  Patient accepts bed at [facility]  Clinical records and request for insurance auth was faxed to [facility]  ECV transport was scheduled for 1800 with Azar Loya and facesheet was faxed to [facility]

## 2020-08-24 NOTE — PROGRESS NOTES
Progress Note - Joe Benjamin 77 y o  female MRN: [id number]    Unit/Bed#: [facility] 376-71 Encounter: [id number]      Assessment:  Desiree duenas 77 y  o  female w/ incarcerated parastomal hernia s/p ex-lap, reduction and parastomal hernia repair with Phasix mesh, LUANN secondary to obstructive uropathy    Plan:  Continue diet  Ambulate  dispo planning, follow up placement - awaiting insurance authorization    Subjective:   No acute events  Doing well  Objective:     Vitals: Blood pressure 109/69, pulse 96, temperature 97 9 °F (36 6 °C), temperature source Oral, resp  rate 18, height 5' (1 524 m), weight 89 4 kg (197 lb 1 5 oz), SpO2 97 %, not currently breastfeeding  ,Body mass index is 38 49 kg/m²  Intake/Output Summary (Last 24 hours) at 8/24/2020 0915  Last data filed at 8/24/2020 0855  Gross per 24 hour   Intake 460 ml   Output 1225 ml   Net -765 ml     Physical Exam  GEN: NAD  HEENT: MMM  CV: warm/well perfused  Lung: normal effort  Ab: Soft, NT/ND  Minimal serous drainage on gauze  Extrem: No CCE  Neuro:  A+Ox3, motor and sensation grossly intact    Scheduled Meds:  Current Facility-Administered Medications   Medication Dose Route Frequency Provider Last Rate    apixaban  2 5 mg Oral BID Carol Ann Davis MD      benzocaine  2 spray Mucosal Once Teresa Gonsalez MD      citalopram  20 mg Oral Daily Samantha Marks PA-C      hydrALAZINE  10 mg Intravenous Q6H PRN Carol Ann Davis MD      HYDROmorphone  0 5 mg Intravenous Q3H PRN Piotr Lockhart MD      Labetalol HCl  10 mg Intravenous Q4H PRN Carol Ann Davis MD      levothyroxine  75 mcg Oral Early Morning Samantha Marks PA-C      melatonin  3 mg Oral HS Samantha Marks PA-C      metoprolol tartrate  25 mg Oral Q12H [address] Dallas Spencer, DO      ondansetron  4 mg Intravenous Q4H PRN Piotr Lockhart MD      oxyCODONE  2 5 mg Oral Q4H PRN Samantha Marks PA-C      oxyCODONE  5 mg Oral Q4H PRN Samantha Marks PA-C      ruxolitinib  10 mg Oral Daily Dheeraj [place], MD      simethicone  80 mg Oral Q6H PRN VICTORINA Ochoa      vancomycin  125 mg Oral Q12H [address] Mary Jane Ahuja MD       Continuous Infusions:   PRN Meds: hydrALAZINE    HYDROmorphone    Labetalol HCl    ondansetron    oxyCODONE    oxyCODONE    simethicone      Invasive Devices     Peripherally Inserted Central Catheter Line            PICC Line 08/14/20 Right Other (Comment) 9 days          Drain            Urostomy Ileal conduit RUQ 1419 days                Lab, Imaging and other studies: I have personally reviewed pertinent reports      VTE Pharmacologic Prophylaxis: Heparin  VTE Mechanical Prophylaxis: sequential compression device

## 2020-08-24 NOTE — SOCIAL WORK
Called son to inform him of insurance approval for snf  Informed him of transport by Ba Romero at [facility] [address] was sent to  red surgery of transport time

## 2020-08-24 NOTE — DISCHARGE SUMMARY
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]  Acute kidney injury superimposed on chronic kidney disease ([facility] ) [N17 9, N18 9]  Nausea and vomiting, intractability of vomiting not specified, unspecified vomiting type [R11 2]    HPI: Anaya Fowler is a 77 y o  female who presents to the hospital with abdominal pain  She states started about 1 day ago  It is associated with nausea and vomiting  She does a history of 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 hydronephrosis  She also has a history of polycythemia vera as well as multiple DVT and PEs for which she is on Eliquis, as well as chronic kidney disease  Workup in the emergency department reveals incarcerated small bowel with a transition point at the level of parastomal hernia causing bowel obstruction  Bowel function returned patient diet was advanced  IR was consulted for placement of a IJ due to difficulty with peripheral IVs   Dispo planning was done in conjunction with case management and physical therapy and occupational therapy  Patient was evaluated for placement at SNF and was deemed ready for discharge on August 24th  Wound care instructions were placed in the discharge summary  Patient was instructed to follow up with General surgery, urology, and Nephrology  She was sent with prophylactic vancomycin  Procedures Performed: No orders of the defined types were placed in this encounter        Summary of Hospital Course:  Patient admitted on August 10th for treatment of small-bowel obstruction secondary to incarcerated peristomal hernia and ileal conduit site   Patient went to OR on August 10th for ex lap parastomal hernia repair with mesh and lysis of adhesions  Nephrology was consulted regarding acute on chronic kidney disease  Urology was consulted for management and evaluation of ileal conduit and stoma  Patient was some difficulties with returning of bowel function postop  She was managed with bowel rest and NG tube placement  Drain output was monitored daily and wound was checked  Significant Findings, Care, Treatment and Services Provided:     August 10-exploratory laparotomy, peristomal hernia repair with mesh and lysis of adhesions    8/9 CT   IMPRESSION:     Small bowel obstruction leading up to 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  Abrupt narrowing involving both the afferent and efferent limbs of herniated bowel at point of entry and exit from hernia sac is concerning for developing   closed-loop obstruction  8/10 CXR   IMPRESSION:     Left jugular catheter in upper SVC with no pneumothorax      Bilateral hydroureteronephrosis with distention of ileal conduit to the level of entry point at the hernia sac     8/12 XR loopogram   Loopogram demonstrating satisfactory appearance of an ileal conduit with free reflux of administered contrast into bilateral ureters, renal pelves and calyces  No ureteral stricture or filling defects seen  8/12 KUB  IMPRESSION:     Nonspecific diffuse air distended small and large bowel loops  This may be due to a postoperative ileus  Continued follow-up recommended  8/14 - KUB  IMPRESSION:     Enteric tube in place distal tip at the gastroduodenal junction      8/14 IR central line placement  IMPRESSION:  Impression:  Ultrasound and fluoroscopically guided placement of a 5-Fijian double-lumen power injectable central venous catheter via the right external jugular vein  Complications: none    Discharge Diagnosis: SBO due to parastomal hernia    Resolved Problems  Date Reviewed: 7/19/2020 None          Condition at Discharge: stable         Discharge instructions/Information to patient and family:   See after visit summary for information provided to patient and family  Provisions for Follow-Up Care:  See after visit summary for information related to follow-up care and any pertinent home health orders  PCP: Ozzie Rosenberg MD    Disposition: Skilled nursing facility at [facility] Readmission: No      Discharge Statement   I spent 30 minutes discharging the patient  This time was spent on the day of discharge  I had direct contact with the patient on the day of discharge  Additional documentation is required if more than 30 minutes were spent on discharge  Discharge Medications:  See after visit summary for reconciled discharge medications provided to patient and family

## 2020-08-24 NOTE — UTILIZATION REVIEW
Continued Stay Review    Date: 8/24/2020                        Current Patient Class: Inpatient   Current Level of Care: Med Surg     HPI:75 y o  female initially admitted on 8/10 d/t incarcerated parastomal hernia s/p ex-lap, reduction and parastomal hernia repair with Phasix mesh, LUANN secondary to obstructive uropathy  Hypertensive episodes, OVSS  Feels well  No complaints  Tolerating diet  No chest pain or shortness of breath  Surg soft diet  ensures  OOB  DVT PPX  PT/OT      Assessment and Plan-: VSS and  abdomen soft and nontender non distended  Dressing chnaged on 8/23  Plan is rehab placement  No acute vents  , tolerating diet      Pertinent Labs/Diagnostic Results:       Results from last 7 days   Lab Units 08/23/20  0513 08/22/20  0525 08/21/20  0611 08/20/20  0525 08/19/20  0507   WBC Thousand/uL 8 25 8 31 7 28 9 11 8 13   HEMOGLOBIN g/dL 7 4* 7 8* 7 8* 8 0* 7 6*   HEMATOCRIT % 24 1* 25 4* 24 6* 26 1* 25 4*   PLATELETS Thousands/uL 202 222 195 203 194   BANDS PCT % 1  --   --   --   --          Results from last 7 days   Lab Units 08/24/20  0506 08/23/20  0513 08/22/20  0646 08/21/20  0611 08/20/20  0525   SODIUM mmol/L 137 139 139 138 138   POTASSIUM mmol/L 4 1 4 0 3 8 3 8 4 0   CHLORIDE mmol/L 107 109* 109* 108 108   CO2 mmol/L 22 21 22 24 24   ANION GAP mmol/L 8 9 8 6 6   BUN mg/dL 38* 35* 29* 27* 25   CREATININE mg/dL 3 10* 3 23* 3 14* 2 72* 2 79*   EGFR ml/min/1 73sq m 14 13 14 17 16   CALCIUM mg/dL 8 1* 7 9* 7 7* 7 9* 8 1*         Results from last 7 days   Lab Units 08/18/20  2114 08/18/20  1557 08/18/20  1056 08/18/20  0636 08/17/20  2034 08/17/20  1535   POC GLUCOSE mg/dl 98 111 108 103 107 104     Results from last 7 days   Lab Units 08/24/20  0506 08/23/20  0513 08/22/20  0646 08/21/20  0611 08/20/20  0525 08/19/20  0507 08/18/20  0532   GLUCOSE RANDOM mg/dL 91 109 105 99 112 104 105       Vital Signs:   Date/Time   Temp   Pulse   Resp   BP   MAP (mmHg)   SpO2   O2 Device   Patient Position - Orthostatic VS    08/24/20 0828   97 9 °F (36 6 °C)   96   18   109/69   85   97 %   None (Room air)   Sitting    08/23/20 2333                     None (Room air)       08/23/20 2318   98 6 °F (37 °C)   74   18   100/64      97 %          08/23/20 2059      90                      08/23/20 1500   98 5 °F (36 9 °C)   89   20   119/73      97 %      Lying    08/23/20 0707   98 3 °F (36 8 °C)   84   20   119/72      95 %               Medications:   Scheduled Medications:  apixaban, 2 5 mg, Oral, BID  benzocaine, 2 spray, Mucosal, Once  citalopram, 20 mg, Oral, Daily  levothyroxine, 75 mcg, Oral, Early Morning  melatonin, 3 mg, Oral, HS  metoprolol tartrate, 25 mg, Oral, Q12H LONG  ruxolitinib, 10 mg, Oral, Daily  vancomycin, 125 mg, Oral, Q12H LONG      Continuous IV Infusions:     PRN Meds:  hydrALAZINE, 10 mg, Intravenous, Q6H PRN  HYDROmorphone, 0 5 mg, Intravenous, Q3H PRN  Labetalol HCl, 10 mg, Intravenous, Q4H PRN  ondansetron, 4 mg, Intravenous, Q4H PRN  oxyCODONE, 2 5 mg, Oral, Q4H PRN  oxyCODONE, 5 mg, Oral, Q4H PRN  simethicone, 80 mg, Oral, Q6H PRN        Discharge Plan: TBD     Network Utilization Review Department  [email or web address]  ATTENTION: Please call with any questions or concerns to [phone] and carefully listen to the prompts so that you are directed to the right person  All voicemails are confidential   Annita Pollard all requests for admission clinical reviews, approved or denied determinations and any other requests to dedicated fax number below belonging to the campus where the patient is receiving treatment   List of dedicated fax numbers for the Facilities:  FACILITY NAME UR FAX NUMBER   ADMISSION DENIALS (Administrative/Medical Necessity) [phone]   [address] (Maternity/NICU/Pediatrics) [phone]   Angélica Gong [phone]   [employer] [address] [facility] [phone]   Javon Serrano [facility] [address] [facility] [phone]   Olena Lopez [address] [facility] [phone] 2[address] [facility], [facility]  [phone] [address] [phone]

## 2020-08-24 NOTE — SOCIAL WORK
Pt accepted at [facility]  Cm faxed clinicals to [facility] at [phone] to request auth

## 2020-08-25 PROBLEM — F32.A DEPRESSION: Status: ACTIVE | Noted: 2020-08-25

## 2020-08-25 PROBLEM — Z86.19 HISTORY OF SHINGLES: Status: ACTIVE | Noted: 2020-08-25

## 2020-08-25 PROBLEM — Z48.815 ENCOUNTER FOR SURGICAL AFTERCARE FOLLOWING SURGERY ON THE DIGESTIVE SYSTEM: Status: ACTIVE | Noted: 2020-08-25

## 2020-08-25 PROBLEM — Z86.19 HISTORY OF CLOSTRIDIOIDES DIFFICILE COLITIS: Status: ACTIVE | Noted: 2020-08-25

## 2020-08-25 LAB
GLUCOSE SERPL-MCNC: 93 MG/DL (ref 65–140)
GLUCOSE SERPL-MCNC: 99 MG/DL (ref 65–140)
SARS-COV-2 RNA RESP QL NAA+PROBE: NEGATIVE

## 2020-08-25 PROCEDURE — 97116 GAIT TRAINING THERAPY: CPT

## 2020-08-25 PROCEDURE — 87635 SARS-COV-2 COVID-19 AMP PRB: CPT | Performed by: INTERNAL MEDICINE

## 2020-08-25 PROCEDURE — 99306 1ST NF CARE HIGH MDM 50: CPT | Performed by: INTERNAL MEDICINE

## 2020-08-25 PROCEDURE — 97163 PT EVAL HIGH COMPLEX 45 MIN: CPT

## 2020-08-25 PROCEDURE — 82948 REAGENT STRIP/BLOOD GLUCOSE: CPT

## 2020-08-25 PROCEDURE — 97530 THERAPEUTIC ACTIVITIES: CPT

## 2020-08-25 PROCEDURE — 97535 SELF CARE MNGMENT TRAINING: CPT

## 2020-08-25 PROCEDURE — 97167 OT EVAL HIGH COMPLEX 60 MIN: CPT

## 2020-08-25 RX ADMIN — Medication 125 MG: at 21:06

## 2020-08-25 RX ADMIN — APIXABAN 2.5 MG: 2.5 TABLET, FILM COATED ORAL at 10:09

## 2020-08-25 RX ADMIN — SIMETHICONE CHEW TAB 80 MG 80 MG: 80 TABLET ORAL at 05:15

## 2020-08-25 RX ADMIN — METOPROLOL TARTRATE 25 MG: 50 TABLET, FILM COATED ORAL at 10:08

## 2020-08-25 RX ADMIN — OXYCODONE HYDROCHLORIDE 5 MG: 5 TABLET ORAL at 19:47

## 2020-08-25 RX ADMIN — CITALOPRAM HYDROBROMIDE 20 MG: 20 TABLET ORAL at 10:08

## 2020-08-25 RX ADMIN — Medication 125 MG: at 09:56

## 2020-08-25 RX ADMIN — MELATONIN TAB 3 MG 3 MG: 3 TAB at 21:03

## 2020-08-25 RX ADMIN — APIXABAN 2.5 MG: 2.5 TABLET, FILM COATED ORAL at 16:00

## 2020-08-25 RX ADMIN — LEVOTHYROXINE SODIUM 75 MCG: 75 TABLET ORAL at 05:13

## 2020-08-25 NOTE — PLAN OF CARE
Problem: PHYSICAL THERAPY ADULT  Goal: Performs mobility at highest level of function for planned discharge setting  See evaluation for individualized goals  Description: Treatment/Interventions: Functional transfer training, LE strengthening/ROM, Elevations, Therapeutic exercise, Endurance training, Patient/family training, Equipment eval/education, Bed mobility, Gait training, Spoke to nursing, Spoke to case management  Equipment Recommended: Walker(progress to LRAD)       See flowsheet documentation for full assessment, interventions and recommendations  Note: Prognosis: Good  Problem List: Decreased strength, Decreased range of motion, Decreased endurance, Decreased mobility, Impaired balance     Barriers to Discharge: Inaccessible home environment, Decreased caregiver support     PT Discharge Recommendation: [address]          See flowsheet documentation for full assessment

## 2020-08-25 NOTE — ASSESSMENT & PLAN NOTE
· Hemoglobin baseline 9-10 most recent hemoglobin prior to hospital discharge 7 4  · Will check CBC with differential in the morning  · Will check iron panel in the morning likely need IV Venofer  · Will transfuse for hemoglobin less than 7

## 2020-08-25 NOTE — PROGRESS NOTES
Medication Regimen Review (MRR)    To promote positive health outcomes and reduce adverse consequences the patient's medication therapy has been reviewed by a pharmacist for the following potential problems:   1   documented indication and therapeutic benefits  2   appropriate dose, frequency, route, and duration of therapy  3   medication interactions, side effects, and allergies  4   medication or transcription errors  Medications are also reviewed for appropriate monitoring, duplicate therapy, and dose reduction  Based on the review please see the following recommendations  Patient information:    The patient is 77 y o  admitted for post parastomal hernia repair  CrCl 14 8 ml/min    TSH 4 2 on 7/16/20      Wt Readings from Last 1 Encounters:   08/24/20 78 9 kg (173 lb 15 1 oz)       Lab Results   Component Value Date    GLUCOSE 138 10/04/2016    CALCIUM 8 1 (L) 08/24/2020     12/17/2015    K 4 1 08/24/2020    CO2 22 08/24/2020     08/24/2020    BUN 38 (H) 08/24/2020    CREATININE 3 10 (H) 08/24/2020         Recommendations: There are no recommendations from the pharmacy department at this time      Wolfgang Davis, Pharmacist  [phone]

## 2020-08-25 NOTE — PROGRESS NOTES
I have discussed with patient the need for blood work including CBC and BMP to monitor for anemia and worsening renal function  Patient has difficulty referred veins and she needed central line placement by IR in the hospital I recommended IR midline insertion while in the rehab versus monitor clinically patient elected to be monitored clinically rather than obtaining blood work unless she developed worsening dizziness

## 2020-08-25 NOTE — PHYSICAL THERAPY NOTE
Physical Therapy Evaluation and Treatment    Patient's Name: Nadja Ram    Admitting Diagnosis  S/P hernia repair [D10 851, Z87 [id number]]    Problem List  Patient Active Problem List   Diagnosis    Chronic saddle pulmonary embolism (HCC)    Stage 4 chronic kidney disease ([facility]    Bladder mass    Small bowel obstruction ([facility]    Obstructive uropathy    Secondary hyperparathyroidism of renal origin ([facility]    Hypothyroidism    Hypertension    Polycythemia vera ([facility]    Fall    Subdural hematoma, acute (HCC)    Intraventricular hemorrhage (HCC)    Diarrhea    Recurrent Clostridium difficile diarrhea    Anemia in CKD (chronic kidney disease)    Mass of urethra    Stage 5 chronic kidney disease not on chronic dialysis ([facility]    Thoracic compression fracture (HCC)    Hematuria    Abnormal finding on MRI of brain    Benign neoplasm of supratentorial region of brain ([facility]    Meningioma ([facility]    UTI (urinary tract infection)    Herpes zoster    Inverted T wave    Polycythemia    Encounter for surgical aftercare following surgery on the digestive system    History of Clostridioides difficile colitis    History of shingles    Depression       Past Medical History  Past Medical History:   Diagnosis Date    Anxiety     Chronic kidney disease (CKD), stage IV (severe) (HCC)     stage IV    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    Compression fracture of cervical spine (HCC)     Hydronephrosis     Hypertension     Hypothyroid     Incontinence     Lung mass     Improving on PET/CT 1/2016    Polycythemia vera ([facility]     Pulmonary embolism ([facility] ) 2014    Urinary tract infection        Past Surgical History  Past Surgical History:   Procedure Laterality Date    ABDOMINAL ADHESION SURGERY N/A 8/9/2020    Procedure: LYSIS ADHESIONS;  Surgeon: Keara Fam DO;  [facility];  Service: General    BLADDER SUSPENSION      BOTOX INJECTION N/A 7/27/2016    Procedure: BOTOX INJECTION ;  Surgeon: Angella Sandoval MD;  [facility];  Service:    [address], RADICAL WITH ILEOCONDUIT N/A 10/4/2016    Procedure: Jaimie Payer WITH ILEAL CONDUIT ;  Surgeon: Angella Sandoval MD;  [facility];  Service:    [place] CYSTOSCOPY W/ RETROGRADES Bilateral 7/27/2016    Procedure: Ambika Del Real; RETROGRADE PYELOGRAM ;  Surgeon: Angella Sandoval MD;  [facility];  Service:    [address]      IR NON-TUNNELED CENTRAL LINE PLACEMENT  7/17/2020    IR NON-TUNNELED CENTRAL LINE PLACEMENT  8/14/2020    LAPAROTOMY N/A 8/9/2020    Procedure: LAPAROTOMY EXPLORATORY, PARASTOMAL HERNIA REPAIR WITH MESH;  Surgeon: Vicky Alarcon DO;  [facility];  Service: General    RI COLONOSCOPY FLX DX W/COLLJ SPEC WHEN PFRMD N/A 8/31/2016    Procedure: COLONOSCOPY;  Surgeon: Doretha Armas MD;  [facility];   Service: Gastroenterology    RI CYSTOSCOPY,INSERT URETERAL STENT Bilateral 7/27/2016    Procedure: STENT INSERTION; EXCISION OF MESH ;  Surgeon: Angella Sandoval MD;  [facility];  Service: Urology    TONSILLECTOMY      TUBAL LIGATION      WISDOM TOOTH EXTRACTION         Recent Imaging  No orders to display       Recent Vital Signs  Vitals:    08/25/20 0707 08/25/20 1405 08/25/20 1434 08/25/20 1500   BP: 123/74 123/65     BP Location: Left arm Left arm     Pulse: 80 74     Resp: 19 18     Temp: 97 5 °F (36 4 °C) 97 9 °F (36 6 °C)     TempSrc: Temporal Temporal     SpO2: 96% 98%  97%   Weight:       Height:   5' (1 524 m)        Standardized Assessments  [facility] 6-Click: 64/98    Home Evaluation (virtual) and Family Training  will address as appropriate       08/25/20 1405 - 1420   Note Type   Note type Eval/Treat   Pain Assessment   Pain Assessment Tool Pain Assessment not indicated - pt denies pain   Pain Score No Pain   Home Living   Type of [address] Two level;Stairs to enter with rails;Bed/bath upstairs; Laundry in basement  (6 MARLON, L HR ascending; inside) L HR)   Bathroom Shower/Tub Walk-in shower   Bathroom Toilet Standard   Bathroom Equipment Grab bars in shower;Built-in shower seat   Bathroom Accessibility Accessible via walker   Home Equipment Walker;Cane   Prior Function   Level of [place] Independent with ADLs and functional mobility   Lives With Son   Martina Vivar Help From Family   ADL Assistance Independent   IADLs Independent   Falls in the last 6 months 0   Vocational Retired   Restrictions/Precautions   [employer] Bearing Precautions Per Order No   Other Precautions Fall Risk  (ostomy)   General   Family/Caregiver Present No   Cognition   Overall Cognitive Status WFL   Arousal/Participation Alert   Attention Within functional limits   Orientation Level Oriented X4   Memory Within functional limits   Following Commands Follows all commands and directions without difficulty   Strength RLE   R Hip Flexion 4/5   R Knee Flexion 4/5   R Knee Extension 4/5   R Ankle Dorsiflexion 4/5   R Ankle Plantar Flexion 4/5   Strength LLE   L Hip Flexion 4/5   L Knee Flexion 4/5   L Knee Extension 4/5   L Ankle Dorsiflexion 4/5   L Ankle Plantar Flexion 4/5   Coordination   Movements are Fluid and Coordinated 1   Sensation WFL   Light Touch   RLE Light Touch Grossly intact   LLE Light Touch Grossly intact   Bed Mobility   Additional Comments pt found and left in bedside recliner   Transfers   Sit to Stand   (CGA)   Additional items Armrests; Increased time required;Verbal cues   Stand to Sit   (CGA)   Additional items Armrests; Increased time required;Verbal cues   Additional Comments with RW   Ambulation/Elevation   Gait pattern Excessively slow; Short stride   Gait Assistance 5  Supervision   Additional items Assist x 1;Verbal cues   Assistive Device Rolling walker   Distance 50ft   Balance   Static Sitting Good   Dynamic Sitting Fair +   Static Standing Fair   Dynamic Standing [address] Deficit   Endurance Deficit Yes   Endurance Deficit Description deconditioned and fatigue   Activity Tolerance   Activity Tolerance Patient limited by fatigue   Nurse Made Aware spoke to RN   Assessment   Prognosis Good   Problem List Decreased strength;Decreased range of motion;Decreased endurance;Decreased mobility; Impaired balance   Barriers to Discharge Inaccessible home environment;Decreased caregiver support   Goals   Patient Goals to get back to as ind as i was before   STG Expiration Date 09/08/20   Short Term Goal #1 see grid   PT Treatment Day 1   Plan   Treatment/Interventions Functional transfer training;LE strengthening/ROM; Elevations; Therapeutic exercise; Endurance training;Patient/family training;Equipment eval/education; Bed mobility;Gait training;Spoke to nursing;Spoke to case management   PT Frequency   (5-7x/wk)   Recommendation   PT Discharge Recommendation Post-Acute Rehabilitation Services   Equipment Recommended Ash Boone  (progress to LRAD)       Richardson Mantilla is a 77 y o  female admitted to [facility] on [facility]/24/2020 for Encounter for surgical aftercare following surgery on the digestive system  Pt  has a past medical history of Anxiety, Chronic kidney disease (CKD), stage IV (severe) ([address] ), Chronic thrombosis of subclavian vein (HCC), Compression fracture of cervical spine ([address] ), Hydronephrosis, Hypertension, Hypothyroid, Incontinence, Lung mass, Polycythemia vera ([address] ), Pulmonary embolism ([address] ) (2014), and Urinary tract infection  PT was consulted and pt was seen on 8/25/2020 for mobility assessment and d/c planning  Pt presents seated in bedside recliner alert and agreeable to therapy  She is reporting no pain at this time  Does report intermittent tingling in the feet  Sensation intact to light touch BLEs  She has good strength in BLEs as well   Her primary limitation is decreased endurance, she reports being able to tolerate up to an hour on her feet PTA, now only able to ambulate short household distances  Pt is currently functioning at a  supervision assistance x1 level for transfers, supervision assistance x1 level for ambulation with Rolling Walker  Pt will benefit from continued skilled IP PT to address the above mentioned impairments  in order to maximize recovery and increase functional independence when completing mobility and ADLs  Currently PT recommendations for DME include RW progress to LRAD as able  At this time PT recommendations for d/c are home with skilled therapy pending progress  [facility] 6-CLICK    How much difficulty does the patient currently have     (1=Unable; 2=A lot; 3=A little; 4=None)     Score     1) Turning over in bed?     3     2) Sitting and standing from chair with arms? 3     3) Moving from lying on back to sitting at edge of bed? 3     How much help from another person does the patient currently need     (1=Total; 2=A lot; 3=A little; 4=None)        4) Moving to and from a bed to chair? 3     5) Walk in a hospital room?      3     6) Climb 3-5 steps with a railing?              3                                                                                                         Total          18/24       PT GG Scores  Roll left and right:       Sit to Lying:       Lying to Sit EOB:       Sit to Stand:  4) Supervision or touching) verbal cues and/or touching, steadying, contact guard      Chair/Bed to chair Transfer:  4) Supervision or touching) verbal cues and/or touching, steadying, contact guard      Toilet Transfer:  4) Supervision or touching) verbal cues and/or touching, steadying, contact guard      Walk 10ft 4) Supervision or touching) verbal cues and/or touching, steadying, contact guard      Walk 50ft with 2 turns 4) Supervision or touching) verbal cues and/or touching, steadying, contact guard Walk 150ft 88) Not attempted due to medical condition or safety concerns     Wheel 50ft with 2 turns 9) Not Applicable) pt did not complete PTA     Wheel 090MN 9) Not Applicable) pt did not complete PTA     Car transfer 4) Supervision or touching) verbal cues and/or touching, steadying, contact guard      Walk 10ft over uneven ground 4) Supervision or touching) verbal cues and/or touching, steadying, contact guard      1 step 88) Not attempted due to medical condition or safety concerns     4 steps 88) Not attempted due to medical condition or safety concerns     12 steps 88) Not attempted due to medical condition or safety concerns     Pick object from floor 88) Not attempted due to medical condition or safety concerns         Physical Therapy Treatment                      (23 minutes)                                                                 08/25/20 1420   Transfers   Sit to Stand 5  Supervision   Additional items Armrests; Increased time required;Verbal cues   Stand to Sit 5  Supervision   Additional items Armrests; Increased time required;Verbal cues   Toilet transfer 5  Supervision   Additional items Armrests; Increased time required;Verbal cues;Standard toilet   Car transfer 5  Supervision   Additional items Armrests; Increased time required;Verbal cues   Additional Comments with RW   Ambulation/Elevation   Gait pattern Excessively slow; Short stride   Gait Assistance 5  Supervision   Additional items Assist x 1;Verbal cues   Assistive Device Rolling walker   Distance 20ft, 50ft, 10ft   Balance   Static Sitting Good   Dynamic Sitting Fair +   Static Standing Fair   Dynamic Standing Fair -   Ambulatory Fair -   Endurance Deficit   Endurance Deficit Yes   Endurance Deficit Description deconditioned and fatigue       Goals STG achieved within 2 weeks Performance at Initial Evaluation (8/25/2020) Last date completed     Bed mobility skills including rolling and repositioning to prevent skin breakdown and decrease caregiver burden  modified independent assistance          8/25         Supine to sit transitions to increase ease of transfer, allow pt to get out of bed, and decrease caregiver burden       modified independent assistance          8/25     Sit to supine transitions to increase ease of transfer, allow pt to get back into bed, and decrease caregiver burden       modified independent assistance          8/25     Functional transfers to facilitate safe return to previous living environment      modified independent assistance   supervision assistance x1   8/25     Ambulation with least restrictive AD; including at least 2 turns for safe household distance ambulation       modified independent assistance     supervision assistance x1     8/25     Ascend/descend a full flight of steps with left handrail, with device prn, for safe access to previous living environment       modified independent assistance     Not attempted 2* fatigue       8/25     Ascend/descend a curb step, using appropriate AD and method, no handrails, for safe access to previous living environment       modified independent assistance     Not attempted 2* fatigue       8/25     Improve standing functional balance to allow for pt to  object from floor          modified independent assistance     Not attempted 2* fatigue       8/25     Areta Cogan PT, DPT

## 2020-08-25 NOTE — ASSESSMENT & PLAN NOTE
· Continue home dose Eliquis 2 5 mg b i d  Unclear why reduce does given her age and weight does not meet criteria  · She does have history of subdural hematoma in the past  · Patient follow-up with heme oncology given history of polycythemia vera

## 2020-08-25 NOTE — ASSESSMENT & PLAN NOTE
Patient had prolonged hospitalization for 2 weeks  She was admitted and [facility] from August 10 through August 24th for incarcerated peristomal hernia at the ileal conduct side  She is status post ex laparotomy and kirt stomal hernia repair with mesh and lysis of adhesions  He had postoperative difficulty with bowel function requiring bowel rest and NG tube placement which is resolved  IR placed IJ line due to difficulty accessing peripheral IV  Following the surgery patient was found to be severely deconditioned with inability to resume activity of daily living due to weakness and decreased endurance  Therefore recommended to be admitted at rehab for further strengthening  Will consult PT/OT for evaluation treatment

## 2020-08-25 NOTE — ASSESSMENT & PLAN NOTE
· Last infection 2019  · Treated with vancomycin prophylaxis while on Keflex in recent hospitalization  · Will discontinue vancomycin today as patient no longer on antibiotic for 48 hours

## 2020-08-25 NOTE — ASSESSMENT & PLAN NOTE
· Creatinine baseline 2-3 currently within baseline  · She has functional solitary right kidney with chronic 8 mm stone in the lower pole  · Outpatient follow-up with Nephrology September 24 20   · Intermittent IV fluids for worsening renal function  · Aggressively treat any evidence urinary tract infection given solitary right function kidney

## 2020-08-25 NOTE — ASSESSMENT & PLAN NOTE
· Follow-up with heme oncology  · Has been on Jakafi 10 mg daily will order to continue however unsure if it will be available

## 2020-08-25 NOTE — ASSESSMENT & PLAN NOTE
· This is a chronic problem  She follow-up with urology 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 elevated bladder pressure leading to hydronephrosis and CKD  · Last seen urology July 2019 was scheduled for yearly follow-up

## 2020-08-25 NOTE — UTILIZATION REVIEW
Notification of Discharge  This is a Notification of Discharge from our facility [address]  Please be advised that this patient has been discharge from our facility  Below you will find the admission and discharge date and time including the patients disposition  PRESENTATION DATE: 8/9/2020  5:03 PM  OBS ADMISSION DATE:   IP ADMISSION DATE: 8/10/20 0508   DISCHARGE DATE: 8/24/2020  6:24 PM  DISPOSITION: Released to SNF/TCU/SNU Facility Released to SNF/TCU/SNU Facility   Admission Orders listed below:  Admission Orders (From admission, onward)     Ordered        08/10/20 0508  Inpatient Admission  Once                   Please contact the UR Department if additional information is required to close this patient's authorization/case  [address] Utilization Review Department  Main: [phone] x carefully listen to the prompts  All voicemails are confidential   [email or web address]  Send all requests for admission clinical reviews, approved or denied determinations and any other requests to dedicated fax number below belonging to the campus where the patient is receiving treatment   List of dedicated fax numbers:  [address] DENIALS (Administrative/Medical Necessity) [phone]   [address] (Maternity/NICU/Pediatrics) [phone]   Zachariah Rowell [phone]   Ariel Del Cid [phone]   Tamika Rodríguez [phone]   Carito Barrett [address] [phone]   [facility]  [phone]   [address] [place] [address], [address]  2401 [facility] 1000 [facility] [phone]

## 2020-08-25 NOTE — NURSING NOTE
Pt admitted to [facility] at [address], arrived via transport  AxOx4, has glasses and upper and lower dentures present  Abdominal surgical incision with staples  Drainage noted, moderate amt of ss  Changed dressing as ordered  Will order wound care consult  Has multiple bruises on BLUE, RUE has some edema noted, states Dr's have been made aware  Will make Dr aware here at [facility]  Also has DSD to R clavicle area, port was just removed prior to admission here  Did not touch this dressing  She denies pain, but states she does have pain in her abdomen at times and its @ 5/10  She lives with her son, she is the caregiver of her and her 50 yr old son  She is independent at her baseline  Cont of B and B, does wear pull ups   Called son Virgia Heimlich to update him and give him her phone number to room

## 2020-08-25 NOTE — ASSESSMENT & PLAN NOTE
· About 2 weeks ago she had dermatomal skin lesion consistent with shingles on the right gluteus region lesions are crusted no longer need isolation  There is no post herpetic neuralgia

## 2020-08-25 NOTE — PLAN OF CARE
Problem: OCCUPATIONAL THERAPY ADULT  Goal: Performs self-care activities at highest level of function for planned discharge setting  See evaluation for individualized goals  Description: Treatment Interventions: ADL retraining, Functional transfer training, UE strengthening/ROM, Endurance training, Patient/family training, Continued evaluation, Activityengagement, Energy conservation          See flowsheet documentation for full assessment, interventions and recommendations  Note: Limitation: Decreased ADL status, Decreased UE strength, Decreased Safe judgement during ADL, Decreased endurance, Decreased high-level ADLs  Prognosis: Good  Assessment: Pt is a 77 y o  female seen for OT evaluation s/p admit to [address] on 8/24/2020 w/ Encounter for surgical aftercare following surgery on the digestive system  See above for extensive list of comorbidities affecting Pt's functional performance at time of assessment  Pt has an ostomy at baseline since 2016, reports independent management  Personal factors affecting Pt at time of IE include:steps to enter environment, limited home support, difficulty performing ADLS, difficulty performing IADLS , health management  and environment  Prior to admission, Pt was independent with ADL and IADLs, did not use AD, drives  Upon evaluation: Pt requires CGA for transfers, verbal cues for hand placement  Pt ambulated within room, reports RPE 7/10  Pt endorses increased difficulty with LB ADLs  The following deficits impact occupational performance: weakness, decreased strength, decreased balance, decreased tolerance, impaired problem solving, decreased safety awareness, increased pain and orthopedic restrictions  Pt to benefit from continued skilled OT services while in the hospital to address deficits as defined above and maximize level of functional independence w ADL's and functional mobility   Occupational performance areas to address include: grooming, bathing/shower, toilet hygiene, dressing, health maintenance, functional mobility and clothing management  From OT standpoint, recommendation at time of d/c would be home with social support         OT Discharge Recommendation: Return to previous environment with social support

## 2020-08-25 NOTE — OCCUPATIONAL THERAPY NOTE
Occupational Therapy Evaluation & Treatment Note      Patient Name: Toni Bell  HBMHE'Y Date: 8/25/2020  Problem List  Principal Problem:    Encounter for surgical aftercare following surgery on the digestive system  Active Problems:    Chronic saddle pulmonary embolism ([facility] )    Stage 4 chronic kidney disease ([facility] )    Obstructive uropathy    Hypothyroidism    Hypertension    Polycythemia vera ([facility] )    Anemia in CKD (chronic kidney disease)    History of Clostridioides difficile colitis    History of shingles    Depression    Past Medical History  Past Medical History:   Diagnosis Date    Anxiety     Chronic kidney disease (CKD), stage IV (severe) (HCC)     stage IV    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    Compression fracture of cervical spine (HCC)     Hydronephrosis     Hypertension     Hypothyroid     Incontinence     Lung mass     Improving on PET/CT 1/2016    Polycythemia vera ([facility] )     Pulmonary embolism ([facility] ) 2014    Urinary tract infection      Past Surgical History  Past Surgical History:   Procedure Laterality Date    ABDOMINAL ADHESION SURGERY N/A 8/9/2020    Procedure: LYSIS ADHESIONS;  Surgeon: Jose Miguel Benjamin DO;  [facility];  Service: General    BLADDER SUSPENSION      BOTOX INJECTION N/A 7/27/2016    Procedure: BOTOX INJECTION ;  Surgeon: Susan Rosario MD;  [facility];  Service:     CHOLECYSTECTOMY N/A     COLONOSCOPY      CYSTECTOMY, RADICAL WITH ILEOCONDUIT N/A 10/4/2016    Procedure: Reed Duvall ;  Surgeon: Susan Rosario MD;  [facility];  Service:    Jacque Grandchild W/ RETROGRADES Bilateral 7/27/2016    Procedure: Luisito Chavez; RETROGRADE PYELOGRAM ;  Surgeon: Susan Rosario MD;  [facility];  Service:     HERNIA REPAIR      IR NON-TUNNELED CENTRAL LINE PLACEMENT  7/17/2020    IR NON-TUNNELED CENTRAL LINE PLACEMENT  8/14/2020    LAPAROTOMY N/A 8/9/2020    Procedure: LAPAROTOMY EXPLORATORY, PARASTOMAL HERNIA REPAIR WITH MESH;  Surgeon: Titi Turner DO;  [facility];  Service: General    MD COLONOSCOPY FLX DX W/COLLJ SPEC WHEN PFRMD N/A 8/31/2016    Procedure: COLONOSCOPY;  Surgeon: Yuly Mathews MD;  Location: [facility]; Service: Gastroenterology    MD CYSTOSCOPY,INSERT URETERAL STENT Bilateral 7/27/2016    Procedure: STENT INSERTION; EXCISION OF MESH ;  Surgeon: Nic Springer MD;  [facility];  Service: Urology    TONSILLECTOMY      TUBAL LIGATION      WISDOM TOOTH EXTRACTION               08/25/20 1013--> 23 min txt    Note Type   Note type Eval/Treat   Restrictions/Precautions   Weight Bearing Precautions Per Order No   Other Precautions Contact/isolation; Fall Risk  (ostomy )   Pain Assessment   Pain Assessment Tool Pain Assessment not indicated - pt denies pain   Home Living   Type of [facility] Two level;Stairs to enter with rails;Bed/bath upstairs; Laundry in basement  (6 MARLON)   Bathroom Shower/Tub Walk-in shower   Bathroom Toilet Standard   Bathroom Equipment Grab bars in shower;Built-in shower seat   Bathroom Accessibility Accessible via walker   Home Equipment Walker;Cane   Prior Function   Level of [place] Independent with ADLs and functional mobility   Lives With Son   Receives Help From Family   ADL Assistance Independent   IADLs Independent   Falls in the last 6 months 0   Vocational Retired   Comments Pt reports that her son with autism lives with her  Pt does drive, typically does the grocery shopping and meal prep  Pt states her son is able to assist with laundry, dishes, cleaning, and garbage management      Psychosocial   Psychosocial (WDL) WDL   Subjective   Subjective "It's been a long few weeks"   ADL   Eating Assistance 5  Supervision/Setup   Grooming Assistance 5  Supervision/Setup   UB Bathing Assistance 4  Minimal Assistance   LB Bathing Assistance 3  Moderate Assistance   [address] 4 Minimal Assistance   LB Dressing Assistance 3  Moderate Assistance   Toileting Assistance  3  Moderate Assistance   Toileting Deficit Clothing management down;Clothing management up;Perineal hygiene   Bed Mobility   Additional Comments Pt received OOB in chair, deferred at this time  Transfers   Sit to Stand   [facility])   Additional items Verbal cues   Stand to Sit   (CGA)   Additional items Verbal cues   Stand pivot   (CGA)   Additional items Verbal cues   Toilet transfer   (CGA)   Additional items Armrests; Verbal cues   Functional Mobility   Functional Mobility   (CGA)   Additional Comments short distance ambulation    Additional items Rolling walker   Balance   Static Sitting Good   Dynamic Sitting Fair +   Static Standing Fair   Dynamic Standing Fair -   Ambulatory Fair -   Activity Tolerance   Activity Tolerance Patient limited by fatigue   RUE Assessment   RUE Assessment WFL   LUE Assessment   LUE Assessment WFL   Hand Function   Gross Motor Coordination Functional   Fine Motor Coordination Functional   Sensation   Light Touch No apparent deficits   Proprioception   Proprioception No apparent deficits   Perception   Inattention/Neglect Appears intact   Cognition   Overall Cognitive Status WFL   Arousal/Participation Cooperative   Attention Within functional limits   Orientation Level Oriented X4   Memory Within functional limits   Following Commands Follows all commands and directions without difficulty   Comments Pt pleasant, with good recall of events leading to hospitalization and medical history  Assessment   Limitation Decreased ADL status; Decreased UE strength;Decreased Safe judgement during ADL;Decreased endurance;Decreased high-level ADLs   Prognosis Good   Assessment   Pt is a 77 y o  female seen for OT evaluation s/p admit to  [facility] on 8/24/2020 w/ Encounter for surgical aftercare following surgery on the digestive system    See above for extensive list of comorbidities affecting Pt's functional performance at time of assessment  Pt has an ostomy at baseline since 2016, reports independent management  Personal factors affecting Pt at time of IE include:steps to enter environment, limited home support, difficulty performing ADLS, difficulty performing IADLS , health management  and environment  Prior to admission, Pt was independent with ADL and IADLs, did not use AD, drives  Upon evaluation: Pt requires CGA for transfers, verbal cues for hand placement  Pt ambulated within room, reports RPE 7/10  Pt endorses increased difficulty with LB ADLs  The following deficits impact occupational performance: weakness, decreased strength, decreased balance, decreased tolerance, impaired problem solving, decreased safety awareness, increased pain and orthopedic restrictions  Pt to benefit from continued skilled OT services while in the hospital to address deficits as defined above and maximize level of functional independence w ADL's and functional mobility  Occupational performance areas to address include: grooming, bathing/shower, toilet hygiene, dressing, health maintenance, functional mobility and clothing management  From OT standpoint, recommendation at time of d/c would be home with social support  Plan   Treatment Interventions ADL retraining;Functional transfer training;UE strengthening/ROM; Endurance training;Patient/family training;Continued evaluation; Activityengagement; Energy conservation   Goal Expiration Date 09/08/20   OT Treatment Day 1   OT Frequency   (5-7x/week)   Additional Treatment Session   Start Time 1037   End Time 1100   Treatment Assessment Pt seen for OT txt following initial evaluation  Pt educated re: figure-4 sit to increase independence with LB dressing, increased assist needed for dressing of RLE  Pt completed bathroom mobility with CGA, tactile cues for walker management in small spaces, body mechanics and walker placement    Pt would benefit from continued OT services to further address deficits with endurance, standing tolerance, activity tolerance, functional strength, and dynamic balance in order to increase safety and maximize functional independence      Additional Treatment Day   (yes)   Recommendation   OT Discharge Recommendation Return to previous environment with social support        Goals STG achieved within 2 weeks Performance at Initial Evaluation (08/25) Current Performance (last date completed)   Grooming while standing at sink Kay/I Supervision     ADL transfers  Kay/I CGA    Bathroom mobility  Kay/I CGA with RW    UB ADL  Kay/I Nathan    LB ADL, AE PRN Kay/I modA    Toileting/clothing management and hygiene Kay/I modA    Dynamic standing balance Fair + Fair -    Increase standing tolerance for inc'd safety with standing purposeful tasks > 10 minutes ~ 4 minutes    Participate in therex 1-3x/week for inc'd overall stamina/activity tolerance for purposeful tasks To complete     Shower stall transfer with grab bar Kay/I     Kitchen mobility for inc'd independence and safety negotiating kitchen environment Kay/I     Light meal prep Kay/I     Bed mobility with HOB flat  Kay/I

## 2020-08-26 ENCOUNTER — TELEPHONE (OUTPATIENT)
Dept: HEMATOLOGY ONCOLOGY | Facility: CLINIC | Age: 75
End: 2020-08-26

## 2020-08-26 LAB
FERRITIN SERPL-MCNC: 265 NG/ML (ref 8–388)
IRON SATN MFR SERPL: 27 %
IRON SERPL-MCNC: 54 UG/DL (ref 50–170)
TIBC SERPL-MCNC: 202 UG/DL (ref 250–450)

## 2020-08-26 PROCEDURE — 82728 ASSAY OF FERRITIN: CPT | Performed by: INTERNAL MEDICINE

## 2020-08-26 PROCEDURE — 83550 IRON BINDING TEST: CPT | Performed by: INTERNAL MEDICINE

## 2020-08-26 PROCEDURE — 97116 GAIT TRAINING THERAPY: CPT

## 2020-08-26 PROCEDURE — 83540 ASSAY OF IRON: CPT | Performed by: INTERNAL MEDICINE

## 2020-08-26 PROCEDURE — 97535 SELF CARE MNGMENT TRAINING: CPT

## 2020-08-26 PROCEDURE — 97530 THERAPEUTIC ACTIVITIES: CPT

## 2020-08-26 RX ADMIN — METOPROLOL TARTRATE 25 MG: 50 TABLET, FILM COATED ORAL at 21:01

## 2020-08-26 RX ADMIN — RUXOLITINIB 10 MG: 10 TABLET ORAL at 09:27

## 2020-08-26 RX ADMIN — APIXABAN 2.5 MG: 2.5 TABLET, FILM COATED ORAL at 08:42

## 2020-08-26 RX ADMIN — APIXABAN 2.5 MG: 2.5 TABLET, FILM COATED ORAL at 17:06

## 2020-08-26 RX ADMIN — MELATONIN TAB 3 MG 3 MG: 3 TAB at 21:01

## 2020-08-26 RX ADMIN — METOPROLOL TARTRATE 25 MG: 50 TABLET, FILM COATED ORAL at 08:41

## 2020-08-26 RX ADMIN — CITALOPRAM HYDROBROMIDE 20 MG: 20 TABLET ORAL at 08:42

## 2020-08-26 RX ADMIN — LEVOTHYROXINE SODIUM 75 MCG: 75 TABLET ORAL at 05:11

## 2020-08-26 NOTE — WOUND OSTOMY CARE
Consult Note - Wound   Desma Comfort 77 y o  female MRN: [id number]  Unit/Bed#: [facility] 544-01 Encounter: [id number]      History and Present Illness:  Patient is seen for wound care consult   The patient is out of bed in the chair for the assessment of the skin and wounds on the abdominal area   The patient has a urostomy that she has had for 4 years and cares for it herself   She reports that she brought her ostomy supplies from home   Continent of bowel and bladder   Min A to stand with a walker   EHOB cushion placed under the patient   Graham score 18   Assessment Findings:   1  Bilateral heels dry and intact   2  Sacral / buttocks dry and intact   3  Resolved shingles on the right side of the abdominal area   4  Mid line abdominal surgical wound - full thickness wound with packing for the mid line of the incisional dehiscence   Moderate serosanguinous drainage on the dressing   5  Left lower abdominal wound - full thicknes wound with pink tissue in the wound bed small serosanguinous drainage      Discussed the plan of care   Will add maxorb to the mid line due to drainage amount   Mid line wound requiring packing   Skin care plans:  1-Hydraguard to bilateral sacrum, buttock and heels BID and PRN  2-Elevate heels to offload pressure  3-Ehob cushion in chair when out of bed  4-Moisturize skin daily with skin nourishing cream   5-Turn/reposition q2h or when medically stable for pressure re-distribution on skin  6  Left lower abdominal wound former JOSE ANTONIO site - cleanse with Normal saline then apply maxorb then a DSD change daily   7  Mid abdomina wound - cleanse with Normal saline then apply 1/2 inch plain packing then top with maxorb then a ABD and secure change daily            Vitals: Blood pressure 138/99, pulse 85, temperature (!) 97 2 °F (36 2 °C), temperature source Temporal, resp  rate 21, height 5' (1 524 m), weight 87 3 kg (192 lb 7 4 oz), SpO2 97 %, not currently breastfeeding  ,Body mass index is 37 59 kg/m²  Wound 08/11/20 Abdomen Mid (Active)   Wound Image   08/26/20 0939   Wound Description Clean fragile 08/26/20 0958   Fina-wound Assessment Clean dry intact 08/26/20 0958   Wound Length (cm) 2 4 cm 08/26/20 0939   Wound Width (cm) 0 9 cm 08/26/20 0939   Wound Depth (cm) 2 cm 08/26/20 0939   Wound Surface Area (cm^2) 2 16 cm^2 08/26/20 0939   Wound Volume (cm^3) 4 32 cm^3 08/26/20 0939   Calculated Wound Volume (cm^3) 4 32 cm^3 08/26/20 0939   Tunneling 2 4 cm 08/26/20 0939   Tunneling in depth located at 11 clock  08/26/20 0939   Closure Unapproximated;Staples 08/26/20 0500   Drainage Amount Moderate 08/26/20 0939   Drainage Description Serosanguineous 08/26/20 0939   Non-staged Wound Description Full thickness 08/26/20 0939   Treatments Cleansed;Irrigation with NSS 08/26/20 0939   Dressing ABD;Dry dressing 08/26/20 0958   Wound packed? Yes 08/26/20 0939   Packing- # removed 1 08/26/20 0939   Packing- # inserted 1 08/26/20 0939   Dressing Changed Changed 08/26/20 0939   Patient Tolerance Tolerated well 08/26/20 0939   Dressing Status Clean;Dry; Intact 08/26/20 0958       Wound 08/17/20 Abdomen Left; Lower (Active)   Wound Image   08/26/20 0938   Wound Description Clean;Beefy red 08/26/20 0938   Fina-wound Assessment Clean;Dry; Intact 08/26/20 0938   Wound Length (cm) 1 cm 08/26/20 0941   Wound Width (cm) 2 4 cm 08/26/20 0941   Wound Depth (cm) 0 4 cm 08/26/20 0941   Wound Surface Area (cm^2) 2 4 cm^2 08/26/20 0941   Wound Volume (cm^3) 0 96 cm^3 08/26/20 0941   Calculated Wound Volume (cm^3) 0 96 cm^3 08/26/20 0941   Change in Wound Size % 26 15 08/26/20 0941   Closure Unapproximated 08/26/20 0500   Drainage Amount Small 08/26/20 0938   Drainage Description Serosanguineous 08/26/20 0938   Non-staged Wound Description Full thickness 08/26/20 0938   Treatments Cleansed;Irrigation with NSS;Site care 08/26/20 0938   Dressing Calcium Alginate 08/26/20 0938   Dressing Changed Changed 08/26/20 6555   Patient Tolerance Tolerated well 08/26/20 0938   Dressing Status Clean;Dry; Intact 08/26/20 0500       Wound care will follow weekly call with questions or concerns / tiger text     Kvng Edgar RN BSN [address]

## 2020-08-26 NOTE — PLAN OF CARE
Problem: OCCUPATIONAL THERAPY ADULT  Goal: Performs self-care activities at highest level of function for planned discharge setting  See evaluation for individualized goals  Description: Treatment Interventions: ADL retraining, Functional transfer training, UE strengthening/ROM, Endurance training, Patient/family training, Continued evaluation, Activityengagement, Energy conservation          See flowsheet documentation for full assessment, interventions and recommendations     Outcome: Progressing  Note: Limitation: Decreased ADL status, Decreased UE strength, Decreased Safe judgement during ADL, Decreased endurance, Decreased high-level ADLs  Prognosis: Good  Assessment: see note      OT Discharge Recommendation: Return to previous environment with social support

## 2020-08-26 NOTE — OCCUPATIONAL THERAPY NOTE
Occupational Therapy Treatment Note    Name:  Haresh De Paz   MRN:   [id number]  Age:     77 y o    Patient Active Problem List   Diagnosis    Chronic saddle pulmonary embolism (HCC)    Stage 4 chronic kidney disease (HCC)    Bladder mass    Small bowel obstruction (HCC)    Obstructive uropathy    Secondary hyperparathyroidism of renal origin ([facility] )    Hypothyroidism    Hypertension    Polycythemia vera ([facility] )    Fall    Subdural hematoma, acute (HCC)    Intraventricular hemorrhage (HCC)    Diarrhea    Recurrent Clostridium difficile diarrhea    Anemia in CKD (chronic kidney disease)    Mass of urethra    Stage 5 chronic kidney disease not on chronic dialysis ([facility] )    Thoracic compression fracture (HCC)    Hematuria    Abnormal finding on MRI of brain    Benign neoplasm of supratentorial region of brain ([facility] )    Meningioma ([facility] )    UTI (urinary tract infection)    Herpes zoster    Inverted T wave    Polycythemia    Encounter for surgical aftercare following surgery on the digestive system    History of Clostridioides difficile colitis    History of shingles    Depression     S/P hernia repair [R53 [id number], [id number]]      Subjective/Goals: "I just have so much on my mind"    Vitals: stable throughout session     Pain: no reports of pain     Treatment Time: (096-171)  63 minutes    Cognition: WFL    Precautions: contact/isolation, fall risk (ostomy)    EVALUATION information:   OT Goal expiration date: 9/8/2020   Treatment day: 2   Discharge recommendation: return home with previous social support      HOME SET UP:  o House- 2 level with stairs to enter with rails  o Bed and bath on 2nd  o Laundry in basement  o Walk in shower _ grab bars and built in shower seat  o DME:  Mami Lujan  o Lives with sone and assist from family PRN-- son with autism   o + drives    Patient education: DEEP BREATHING TECHNIQUES T/O ACTIVITY, ENERGY CONSERVATION TECHNIQUES FOR CARRY OVER UPON D/C, INCREASED FAMILY SUPPORT, CONTINUE PARTICIPATION IN SELF-CARE/MOBILITY WITH STAFF [facility] , OVERALL SAFETY AWARENESS, FALL PREVENTION and ENERGY CONSERVATION     Additional comments:    Assessment/Additional session details:  Patient seen this date for OT with focus on goals as set by OTR  Patient agreeable to skilled OT session with focus on ADL's (bathing, dressing, toileting), Transfers (STS, SPT), Standing tolerance/balance, Strength/ROM/HEP, Activity tolerance, Education on safety, fall prevention and energy conservation techniques, Item retrieval/safe transport with DME ed, ADL AE training, Bathroom/kitchen/home mobility and Fine motor coordination/hand strength  Barriers to treatment include fatigue, shortess of breath, social/family support, decreased strength/coordination, balance , activity tolerance and standing tolerance  Patient educated on safe functional transfers techniques, the appropriate use of AE/DME to improve functional performance, and activity modification techniques for energy conservation  Plans for d/c are home with home OT and family support  Patient is making gains toward OT goals with continued OT recommended at this time to max tolerance, safety and function for appropriate d/c planning  At end of session patient remains in room seated at recliner with all needs within reach    Patient seen for an ADL this AM via sponge bath  Session focused on eating (Ind ), oral hygiene (Ind ), toileting (S), bathing (S), UB dressing (Ind ), LB dressing (Min/Mod A), and don/doff footwear (Max A)  Patient ed on AE to increase LE management  Patient down on self and present abilities-- ed and encouragement provided          Goals STG achieved within 2 weeks Performance at Initial Evaluation (08/25) Current Performance (last date completed)   Grooming while standing at sink Kay/I Supervision  8/26 Supervision     ADL transfers  Kay/I CGA  8/26 STS and SPT supervision    Bathroom mobility  Kay/I CGA with RW  8/26 Supervision + RW   UB ADL  Kay/I Nathan  8/26 Bathing MI; dressing MI    LB ADL, AE PRN aKy/I modA  8/26 min assist with AE ed except socks max assist    Toileting/clothing management and hygiene Kay/I modA  8/26 hygiene supervision; CM supervision (min for thoroughness when fatigued)   Dynamic standing balance Fair + Fair -  8/26 Fair   Increase standing tolerance for inc'd safety with standing purposeful tasks > 10 minutes ~ 4 minutes  8/26 5 min + SOB    Participate in therex 1-3x/week for inc'd overall stamina/activity tolerance for purposeful tasks To complete    8/26 with ADL activity   Shower stall transfer with grab bar Kay/I       Kitchen mobility for inc'd independence and safety negotiating kitchen environment Kay/I   8/26 simulated in room superivsion     Light meal prep Kay/I       Bed mobility with HOB flat  Kay/I          Brenton Closs  8/26/2020

## 2020-08-26 NOTE — SOCIAL WORK
LOS: 2, Bundle: No, 30 day readmit: Yes, admitted 3/74-74 with metabolic encephalopathy and again 8/9 with small bowel obstruction found to have incarcerated peristomal hernia  Pt has ex laparotomy and kirt stomal hernia repair and was deconditioned following surgery  Pt transferred to [facility] for therapy  SW met with patient to review admission packet and consents  Patient reports her son Radhames Salmeron ([phone]) and daughter Gladys Hawthorne ([phone]) share POA  Pt is agreeable to a [facility] with her son Radhames Salmeron who lives in [facility]  Prior to hospitalization, pt lived with her son Adrianne Thompson in a 2 SH with 6-7 MARLON with bedroom/bathroom on the second story with 12 MARLON  Adrianne Thompson is disabled but can provide limited help with meals  Other son Radhames Salmeron staying with Adrianne Thompson while pt is hospitalized  Pt was independent with ADLs prior to hospitalization and drove  She has a built in shower seat,  and [facility] which she did not need  No hx of MH or D&A abuse  PCP is Julian Flores and pharmacy is [facility] on [address]  SW will continue to follow at this time  [facility] scheduled with patient's son Radhames Salmeron Friday at 3:00 PM by phone

## 2020-08-26 NOTE — TELEPHONE ENCOUNTER
Call from [facility] inquiring about refill for jakafi  Patient is in [address] [facility] rehab and med is being given there

## 2020-08-26 NOTE — PHYSICAL THERAPY NOTE
Physical Therapy Treatment Note ( [id number])       08/26/20 1450   Pain Assessment   Pain Assessment Tool 0-10   Restrictions/Precautions   Weight Bearing Precautions Per Order No   Other Precautions Fall Risk  (hernia repair/ostomy)   General   Chart Reviewed Yes   Response to Previous Treatment Patient with no complaints from previous session  Family/Caregiver Present No   Cognition   Overall Cognitive Status WFL   Arousal/Participation Cooperative   Attention Within functional limits   Following Commands Follows all commands and directions without difficulty   Subjective   Subjective Agreeable for treatment   Bed Mobility   Rolling R 4  Minimal assistance   Additional items Assist x 1; Increased time required;Verbal cues   Rolling L 4  Minimal assistance   Additional items Assist x 1; Increased time required;Verbal cues   Supine to Sit 3  Moderate assistance   Additional items Increased time required;LE management;Verbal cues; Bedrails   Sit to Supine 3  Moderate assistance   Additional items Assist x 1; Increased time required;Verbal cues;LE management; Bedrails   Transfers   Sit to Stand 5  Supervision   Additional items Assist x 1; Armrests   Stand to Sit 5  Supervision   Additional items Armrests;Assist x 1; Increased time required;Verbal cues   Stand pivot 5  Supervision   Additional items Verbal cues; Increased time required  (RW)   Toilet transfer 5  Supervision   Additional items Standard toilet   Ambulation/Elevation   Gait pattern Excessively slow; Short stride;Decreased foot clearance   Gait Assistance 5  Supervision   Additional items Assist x 1;Verbal cues   Assistive Device Rolling walker   Distance 50 feet x 2, 22 x 4 feet, 30 feet,    Stair Management Assistance   (CGA)   Additional items Assist x 1;Verbal cues   Stair Management Technique Step to pattern   Number of Stairs   (2 +2 Steps)   Curbs   (4 " step in ll bars with CGA)   Balance   Static Sitting Good   Static Standing Fair  (RW)   Ambulatory Fair  (RW)   Endurance Deficit   Endurance Deficit Yes   Endurance Deficit Description   (pt reports fatigue)   Activity Tolerance   Activity Tolerance Patient limited by fatigue;Patient limited by pain  (expressed some lower abdominal discomfort at end tx )   Assessment   Prognosis Good   Problem List Decreased strength;Decreased range of motion;Decreased endurance; Impaired balance;Decreased mobility; Decreased safety awareness; Obesity; Decreased skin integrity;Pain   Assessment Pt seen in room for assessment in bed mobility  Assistance for LE's required and education on progression to sidelye to decrease stress to abdominal region due to recent surgery  Pt noted to have decreased activity tolerance  Note prior labs with low Hgb  No c/o dizziness today  Pt gait with decreased wisam  She was able to perform elevations today with bilateral UE support  Pt did report that she has an open banister half way up with wall remaining way at home to second floor  She express that she does hold onto the wall as well when descending at home so step performance for FF may be with BUE  Pt was returned to room to recliner chair with legs requested to be in dependent position  Continue with progression of mobility as tolerated  Barriers to Discharge Inaccessible home environment;Decreased caregiver support   Goals   Patient Goals to get back to normal   STG Expiration Date 09/08/20   Short Term Goal #1 see grid   PT Treatment Day 2   Plan   Treatment/Interventions Functional transfer training;LE strengthening/ROM; Elevations; Therapeutic exercise; Endurance training;Patient/family training;Bed mobility;Gait training; Compensatory technique education;Spoke to nursing   Progress Slow progress, decreased activity tolerance   PT Frequency Other (Comment)  (5-7x/wk)     Goals STG achieved within 2 weeks Performance at Initial Evaluation (8/25/2020) Last date completed      Bed mobility skills including rolling and repositioning to prevent skin breakdown and decrease caregiver burden            modified independent assistance               8/26            Supine to sit transitions to increase ease of transfer, allow pt to get out of bed, and decrease caregiver burden          modified independent assistance               8/26      Sit to supine transitions to increase ease of transfer, allow pt to get back into bed, and decrease caregiver burden          modified independent assistance               8/26      Functional transfers to facilitate safe return to previous living environment        modified independent assistance    supervision assistance x1    8/26      Ambulation with least restrictive AD; including at least 2 turns for safe household distance ambulation          modified independent assistance       supervision assistance x1       8/26      Ascend/descend a full flight of steps with left handrail, with device prn, for safe access to previous living environment          modified independent assistance       Not attempted 2* fatigue          8/26      Ascend/descend a curb step, using appropriate AD and method, no handrails, for safe access to previous living environment          modified independent assistance       Not attempted 2* fatigue          8/26      Improve standing functional balance to allow for pt to  object from floor            modified independent assistance       Not attempted 2* fatigue          8/25     Kalee Han, PT

## 2020-08-26 NOTE — DISCHARGE INSTR - OTHER ORDERS
Skin care plans:  1-Hydraguard to bilateral sacrum, buttock and heels BID and PRN  2-Elevate heels to offload pressure  3-Ehob cushion in chair when out of bed  4-Moisturize skin daily with skin nourishing cream   5-Turn/reposition q2h or when medically stable for pressure re-distribution on skin  6  Left lower abdominal wound former JOSE ANTONIO site - cleanse with Normal saline then apply maxorb then a DSD change daily   7   Mid abdominal wound - cleanse with Normal saline then apply 1/2 inch plain packing then top with maxorb then a ABD and secure change daily      Patient upon discharge to follow at the wound center [phone] option 1

## 2020-08-26 NOTE — PLAN OF CARE
Problem: PHYSICAL THERAPY ADULT  Goal: Performs mobility at highest level of function for planned discharge setting  See evaluation for individualized goals  Description: Treatment/Interventions: Functional transfer training, LE strengthening/ROM, Elevations, Therapeutic exercise, Endurance training, Patient/family training, Equipment eval/education, Bed mobility, Gait training, Spoke to nursing, Spoke to case management  Equipment Recommended: Walker(progress to LRAD)       See flowsheet documentation for full assessment, interventions and recommendations  Outcome: Progressing  Note: Prognosis: Good  Problem List: Decreased strength, Decreased range of motion, Decreased endurance, Impaired balance, Decreased mobility, Decreased safety awareness, Obesity, Decreased skin integrity, Pain  Assessment: Pt seen in room for assessment in bed mobility  Assistance for LE's required and education on progression to sidelye to decrease stress to abdominal region due to recent surgery  Pt noted to have decreased activity tolerance  Note prior labs with low Hgb  No c/o dizziness today  Pt gait with decreased wisam  She was able to perform elevations today with bilateral UE support  Pt did report that she has an open banister half way up with wall remaining way at home to second floor  She express that she does hold onto the wall as well when descending at home so step performance for FF may be with BUE  Pt was returned to room to recliner chair with legs requested to be in dependent position  Continue with progression of mobility as tolerated  Barriers to Discharge: Inaccessible home environment, Decreased caregiver support     PT Discharge Recommendation: [id number]          See flowsheet documentation for full assessment

## 2020-08-27 ENCOUNTER — TELEPHONE (OUTPATIENT)
Dept: HEMATOLOGY ONCOLOGY | Facility: CLINIC | Age: 75
End: 2020-08-27

## 2020-08-27 LAB — GLUCOSE SERPL-MCNC: 140 MG/DL (ref 65–140)

## 2020-08-27 PROCEDURE — 99307 SBSQ NF CARE SF MDM 10: CPT | Performed by: INTERNAL MEDICINE

## 2020-08-27 PROCEDURE — 82948 REAGENT STRIP/BLOOD GLUCOSE: CPT

## 2020-08-27 PROCEDURE — 97530 THERAPEUTIC ACTIVITIES: CPT

## 2020-08-27 PROCEDURE — 97110 THERAPEUTIC EXERCISES: CPT

## 2020-08-27 PROCEDURE — 97535 SELF CARE MNGMENT TRAINING: CPT

## 2020-08-27 PROCEDURE — 97116 GAIT TRAINING THERAPY: CPT

## 2020-08-27 RX ADMIN — SIMETHICONE CHEW TAB 80 MG 80 MG: 80 TABLET ORAL at 10:39

## 2020-08-27 RX ADMIN — RUXOLITINIB 10 MG: 10 TABLET ORAL at 09:16

## 2020-08-27 RX ADMIN — MELATONIN TAB 3 MG 3 MG: 3 TAB at 21:46

## 2020-08-27 RX ADMIN — LEVOTHYROXINE SODIUM 75 MCG: 75 TABLET ORAL at 06:13

## 2020-08-27 RX ADMIN — METOPROLOL TARTRATE 25 MG: 50 TABLET, FILM COATED ORAL at 09:15

## 2020-08-27 RX ADMIN — CITALOPRAM HYDROBROMIDE 20 MG: 20 TABLET ORAL at 09:14

## 2020-08-27 RX ADMIN — APIXABAN 2.5 MG: 2.5 TABLET, FILM COATED ORAL at 09:14

## 2020-08-27 RX ADMIN — METOPROLOL TARTRATE 25 MG: 50 TABLET, FILM COATED ORAL at 21:46

## 2020-08-27 NOTE — PROGRESS NOTES
I have evaluated the patient after having a mechanical fall in the bathroom  She is alert and oriented  No focal neurological deficit  She complained of pain on her left knee no swelling normal range of motion  On physical exam vital signs grossly normal   Cardiovascular exam S1/S2 normal   Lung exam bilateral air entry      A/P   # Mechanical fall  · Neuro check every 4 hours for 3 occurances  · If any change in your status Myron BOYER immediately

## 2020-08-27 NOTE — PLAN OF CARE
Problem: PHYSICAL THERAPY ADULT  Goal: Performs mobility at highest level of function for planned discharge setting  See evaluation for individualized goals  Description: Treatment/Interventions: Functional transfer training, LE strengthening/ROM, Elevations, Therapeutic exercise, Endurance training, Patient/family training, Equipment eval/education, Bed mobility, Gait training, Spoke to nursing, Spoke to case management  Equipment Recommended: Walker(progress to LRAD)       See flowsheet documentation for full assessment, interventions and recommendations  Outcome: Progressing  Note: Prognosis: Good  Problem List: Decreased strength, Decreased endurance  Assessment: Pt slow moving with low energy level  But was able to complete 12 steps today with 2 rails - will progress to one rail for 6 MARLON ( has L rail and 1/2 on R then wall at [facility] for FF)pt with some difficulty getting into bed uses R side at home  Barriers to Discharge: [address] home environment, Decreased caregiver support     PT Discharge Recommendation: [address] [place]          See flowsheet documentation for full assessment
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
## 2020-08-27 NOTE — PHYSICAL THERAPY NOTE
54' session      08/27/20 0911   Pain Assessment   Pain Assessment Tool Pain Assessment not indicated - pt denies pain   Restrictions/Precautions   Other Precautions Fall Risk   General   Chart Reviewed Yes   Family/Caregiver Present No   Cognition   Overall Cognitive Status WFL   Arousal/Participation Alert; Cooperative   Attention Within functional limits   Following Commands Follows all commands and directions without difficulty   Subjective   Subjective i was able to get myself in and OOB today ( use of rail on L side of bed)   Bed Mobility   Rolling R 5  Supervision   Supine to Sit 5  Supervision   Additional items Increased time required;Verbal cues   Sit to Supine 5  Supervision   Additional items Increased time required;Verbal cues   Additional Comments bed flat - R side of bed - 2 trials wiht min difficutly   Transfers   Sit to Stand 5  Supervision   Stand to Sit 5  Supervision   Stand pivot 5  Supervision   Toilet transfer 5  Supervision   Ambulation/Elevation   Gait pattern Short stride; Excessively slow;Decreased foot clearance   Gait Assistance 5  Supervision   Assistive Device Rolling walker   Distance 55' x 2   Stair Management Assistance   (CG/min Aof 1)   Stair Management Technique Two rails; Step to pattern   Number of Stairs 12   Curbs 1 + 1 with RW adn CG/ min A Of 1   Balance   Static Standing Fair +   Dynamic Standing Fair   Ambulatory Fair   Endurance Deficit   Endurance Deficit Yes   Endurance Deficit Description easily fatigued   Activity Tolerance   Activity Tolerance Patient limited by fatigue   Exercises   Knee PROM Flexion Bilateral;AROM;20 reps   Knee AROM Long Arc Quad Bilateral;AROM;20 reps   Ankle Pumps Bilateral;AROM;20 reps   Assessment   Prognosis Good   Problem List Decreased strength;Decreased endurance   Assessment Pt slow moving with low energy level  But was able to complete 12 steps today with 2 rails - will progress to one rail for 6 MARLON ( has L rail and 1/2 on R then wall at [facility] for FF)pt with some difficulty getting into bed uses R side at home   Barriers to Discharge Inaccessible home environment;Decreased caregiver support   Goals   Patient Goals walk i guess   STG Expiration Date 09/08/20   PT Treatment Day 3   Plan   Treatment/Interventions   (cont as per POC)   Progress Progressing toward goals   PT Frequency   (5-7x/wk)     Goals STG achieved within 2 weeks Performance at Initial Evaluation (8/25/2020) Last date completed      Bed mobility skills including rolling and repositioning to prevent skin breakdown and decrease caregiver burden            modified independent assistance               8/27            Supine to sit transitions to increase ease of transfer, allow pt to get out of bed, and decrease caregiver burden          modified independent assistance               8/27   pt uses R side of bed at home      Sit to supine transitions to increase ease of transfer, allow pt to get back into bed, and decrease caregiver burden          modified independent assistance               8/27      Functional transfers to facilitate safe return to previous living environment        modified independent assistance    supervision assistance x1    8/27      Ambulation with least restrictive AD; including at least 2 turns for safe household distance ambulation          modified independent assistance       supervision assistance x1       8/27      Ascend/descend a full flight of steps with left handrail, with device prn, for safe access to previous living environment          modified independent assistance       Not attempted 2* fatigue          8/27      Ascend/descend a curb step, using appropriate AD and method, no handrails, for safe access to previous living environment          modified independent assistance       Not attempted 2* fatigue          8/27      Improve standing functional balance to allow for pt to  object from floor            modified independent assistance       Not attempted 2* fatigue          8/25

## 2020-08-27 NOTE — UTILIZATION REVIEW
Date:  8/27/20    Auth#     [id number]         Medical:        Tracee Pereira MD    Physician    Hospitalist    H&P    Signed    Date of Service:  8/25/2020 11:40 AM                Signed         Expand All Collapse All            H&P- Courtney Eubanks [birth year], 77 y o  female MRN: [id number]     Unit/Bed#: -01 Encounter: [id number]     Primary Care Provider: Galen Hawkins MD   Date and time admitted to hospital: 8/24/2020  7:13 PM           * Encounter for surgical aftercare following surgery on the digestive system  Assessment & Plan  Patient had prolonged hospitalization for 2 weeks  She was admitted and [facility] from August 10 through August 24th for incarcerated peristomal hernia at the ileal conduct side  She is status post ex laparotomy and kirt stomal hernia repair with mesh and lysis of adhesions  He had postoperative difficulty with bowel function requiring bowel rest and NG tube placement which is resolved  IR placed IJ line due to difficulty accessing peripheral IV  Following the surgery patient was found to be severely deconditioned with inability to resume activity of daily living due to weakness and decreased endurance  Therefore recommended to be admitted at rehab for further strengthening      Will consult PT/OT for evaluation treatment          Polycythemia vera ([address] )  Assessment & Plan  · Follow-up with heme oncology  · Has been on Jakafi 10 mg daily will order to continue however unsure if it will be available      Hypertension  Assessment & Plan  Continue metoprolol tartrate  Was on nifedipine will resume if blood pressure above 130/80 given CKD and recent LUANN      Hypothyroidism  Assessment & Plan  Continue levothyroxine 75 mcg daily     Obstructive uropathy  Assessment & Plan  · This is a chronic problem    She follow-up with urology 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 elevated bladder pressure leading to hydronephrosis and CKD  · Last seen urology July 2019 was scheduled for yearly follow-up      Stage 4 chronic kidney disease ([address] )  Assessment & Plan  · Creatinine baseline 2-3 currently within baseline  · She has functional solitary right kidney with chronic 8 mm stone in the lower pole  · Outpatient follow-up with Nephrology September 24 20   · Intermittent IV fluids for worsening renal function  · Aggressively treat any evidence urinary tract infection given solitary right function kidney      Chronic saddle pulmonary embolism (HCC)  Assessment & Plan  · Continue home dose Eliquis 2 5 mg b i d  Unclear why reduce does given her age and weight does not meet criteria  · She does have history of subdural hematoma in the past  · Patient follow-up with heme oncology given history of polycythemia vera         History of shingles  Assessment & Plan  · About 2 weeks ago she had dermatomal skin lesion consistent with shingles on the right gluteus region lesions are crusted no longer need isolation  There is no post herpetic neuralgia      History of Clostridioides difficile colitis  Assessment & Plan  · Last infection 2019  · Treated with vancomycin prophylaxis while on Keflex in recent hospitalization  · Will discontinue vancomycin today as patient no longer on antibiotic for 48 hours      Anemia in CKD (chronic kidney disease)  Assessment & Plan  · Hemoglobin baseline 9-10 most recent hemoglobin prior to hospital discharge 7 4  · Will check CBC with differential in the morning  · Will check iron panel in the morning likely need IV Venofer  · Will transfuse for hemoglobin less than 7     Depression  Assessment & Plan  Continue citalopram 20 mg daily       VTE Prophylaxis: Apixaban (Eliquis)  / sequential compression device   Code Status:  Full code-level 1  POLST: POLST form is not discussed and not completed at this time    Discussion with family:  Discussed with son Phil Nguyen      Anticipated Length of Stay:  Patient will be admitted on an SNF Short Term Inpatient basis with an anticipated length of stay of  more than 2 midnights  Justification for Hospital Stay:  Physical therapy and occupational therapy evaluation and treatment      Total Time for Visit, including Counseling / Coordination of Care: 1 hour  Greater than 50% of this total time spent on direct patient counseling and coordination of care      Chief Complaint:   Dizziness     History of Present Illness:     Yesica Sands is a 77 y o  female who presents with past medical history of polycythemia vera, pulmonary embolism on Eliquis, CKD stage 4, hypertension, hypothyroidism, obstructive uropathy status post cystectomy aurinary diversion was just discharged from the hospital following incarcerated bowel obstruction and hernia repair      She was admitted to [facility] with abdominal pain associated with nausea and vomiting found to have incarcerated bowel obstruction around the ileal conduit for nonfunctional bladder for which general surgery did exploratory laparotomy with hernia repair and adhesion lysis on August 10, 2020  She was also treated for acute kidney injury on CKD by Nephrology with IV fluids  Her most of course was complicated by delayed bowel function treated by bowel rest and NG tube  She was noted to have anemia thought due to blood loss secondary to frequent labs hemoglobin was 7 4 upon discharge and her baseline 9-10  IR did right IJ central line given difficulty obtaining prefer IV access  She was also treated with Keflex for 5 days with oral vancomycin for Clostridium difficile prophylaxis given past medical history of C diff      At the time of my evaluation she reported dizziness otherwise denied shortness of breath, cough, fever, chills, numbness or tingling  She reported recent history of shingles on her right gluteal region                          Flower White RN    Registered Nurse    Specialty:  Wound Care Wound Ostomy Care    Addendum    Date of Service:  8/26/2020  4:29 PM                Summary:  Consult           Consult Note - Wound   Omari Campos 77 y o  female MRN: [id number]  Unit/Bed#: -01 Encounter: [id number]        History and Present Illness:  Patient is seen for wound care consult   The patient is out of bed in the chair for the assessment of the skin and wounds on the abdominal area   The patient has a urostomy that she has had for 4 years and cares for it herself   She reports that she brought her ostomy supplies from home   Continent of bowel and bladder   Min A to stand with a walker   EHOB cushion placed under the patient   Graham score 18              Assessment Findings:   1  Bilateral heels dry and intact   2  Sacral / buttocks dry and intact   3  Resolved shingles on the right side of the abdominal area   4  Mid line abdominal surgical wound - full thickness wound with packing for the mid line of the incisional dehiscence   Moderate serosanguinous drainage on the dressing   5  Left lower abdominal wound - full thicknes wound with pink tissue in the wound bed small serosanguinous drainage       Discussed the plan of care   Will add maxorb to the mid line due to drainage amount   Mid line wound requiring packing                    Skin care plans:  1-Hydraguard to bilateral sacrum, buttock and heels BID and PRN  2-Elevate heels to offload pressure  3-Ehob cushion in chair when out of bed  4-Moisturize skin daily with skin nourishing cream   5-Turn/reposition q2h or when medically stable for pressure re-distribution on skin  6  Left lower abdominal wound former JOSE ANTONIO site - cleanse with Normal saline then apply maxorb then a DSD change daily   7   Mid abdomina wound - cleanse with Normal saline then apply 1/2 inch plain packing then top with maxorb then a ABD and secure change daily                Vitals: Blood pressure 138/99, pulse 85, temperature (!) 97 2 °F (36 2 °C), temperature source Temporal, resp  rate 21, height 5' (1 524 m), weight 87 3 kg (192 lb 7 4 oz), SpO2 97 %, not currently breastfeeding  ,Body mass index is 37 59 kg/m²              Wound 08/11/20 Abdomen Mid (Active)   Wound Image   08/26/20 0939   Wound Description Clean fragile 08/26/20 0958   Fina-wound Assessment Clean dry intact 08/26/20 0958   Wound Length (cm) 2 4 cm 08/26/20 0939   Wound Width (cm) 0 9 cm 08/26/20 0939   Wound Depth (cm) 2 cm 08/26/20 0939   Wound Surface Area (cm^2) 2 16 cm^2 08/26/20 0939   Wound Volume (cm^3) 4 32 cm^3 08/26/20 0939   Calculated Wound Volume (cm^3) 4 32 cm^3 08/26/20 0939   Tunneling 2 4 cm 08/26/20 0939   Tunneling in depth located at 11 clock  08/26/20 0939   Closure Unapproximated;Staples 08/26/20 0500   Drainage Amount Moderate 08/26/20 0939   Drainage Description Serosanguineous 08/26/20 0939   Non-staged Wound Description Full thickness 08/26/20 0939   Treatments Cleansed;Irrigation with NSS 08/26/20 0939   Dressing ABD;Dry dressing 08/26/20 0958   Wound packed? Yes 08/26/20 0939   Packing- # removed 1 08/26/20 0939   Packing- # inserted 1 08/26/20 0939   Dressing Changed Changed 08/26/20 0939   Patient Tolerance Tolerated well 08/26/20 0939   Dressing Status Clean;Dry; Intact 08/26/20 0958       Wound 08/17/20 Abdomen Left; Lower (Active)   Wound Image   08/26/20 0938   Wound Description Clean;Beefy red 08/26/20 0938   Fina-wound Assessment Clean;Dry; Intact 08/26/20 0938   Wound Length (cm) 1 cm 08/26/20 0941   Wound Width (cm) 2 4 cm 08/26/20 0941   Wound Depth (cm) 0 4 cm 08/26/20 0941   Wound Surface Area (cm^2) 2 4 cm^2 08/26/20 0941   Wound Volume (cm^3) 0 96 cm^3 08/26/20 0941   Calculated Wound Volume (cm^3) 0 96 cm^3 08/26/20 0941   Change in Wound Size % 26 15 08/26/20 0941   Closure Unapproximated 08/26/20 0500   Drainage Amount Small 08/26/20 0938   Drainage Description Serosanguineous 08/26/20 0938   Non-staged Wound Description Full thickness 08/26/20 0692 Treatments Cleansed;Irrigation with NSS;Site care 08/26/20 0938   Dressing Calcium Alginate 08/26/20 0938   Dressing Changed Changed 08/26/20 0938   Patient Tolerance Tolerated well 08/26/20 0938   Dressing Status Clean;Dry; Intact 08/26/20 0500         Wound care will follow weekly call with questions or concerns / tiger text      Gail Hanna RN BSN Gwen Quintero                   PT NOTES:    Tobi Cooper PT    Physical Therapist    Specialty:  Physical Therapy    Physical Therapy Note    Signed    Date of Service:  8/26/2020  2:50 PM                Signed                           Physical Therapy Treatment Note ( [id number])         08/26/20 1450   Pain Assessment   Pain Assessment Tool 0-10   Restrictions/Precautions   Weight Bearing Precautions Per Order No   Other Precautions Fall Risk  (hernia repair/ostomy)   General   Chart Reviewed Yes   Response to Previous Treatment Patient with no complaints from previous session  Family/Caregiver Present No   Cognition   Overall Cognitive Status WFL   Arousal/Participation Cooperative   Attention Within functional limits   Following Commands Follows all commands and directions without difficulty   Subjective   Subjective Agreeable for treatment   Bed Mobility   Rolling R 4  Minimal assistance   Additional items Assist x 1; Increased time required;Verbal cues   Rolling L 4  Minimal assistance   Additional items Assist x 1; Increased time required;Verbal cues   Supine to Sit 3  Moderate assistance   Additional items Increased time required;LE management;Verbal cues; Bedrails   Sit to Supine 3  Moderate assistance   Additional items Assist x 1; Increased time required;Verbal cues;LE management; Bedrails   Transfers   Sit to Stand 5  Supervision   Additional items Assist x 1; Armrests   Stand to Sit 5  Supervision   Additional items Armrests;Assist x 1; Increased time required;Verbal cues   Stand pivot 5  Supervision   Additional items Verbal cues; Increased time required  (RW) Toilet transfer 5  Supervision   Additional items Standard toilet   Ambulation/Elevation   Gait pattern Excessively slow; Short stride;Decreased foot clearance   Gait Assistance 5  Supervision   Additional items Assist x 1;Verbal cues   Assistive Device Rolling walker   Distance 50 feet x 2, 22 x 4 feet, 30 feet,    Stair Management Assistance    (CGA)   Additional items Assist x 1;Verbal cues   Stair Management Technique Step to pattern   Number of Stairs    (2 +2 Steps)   Curbs    (4 " step in ll bars with CGA)   Balance   Static Sitting Good   Static Standing Fair  (RW)   Ambulatory Fair  (RW)   Endurance Deficit   Endurance Deficit Yes   Endurance Deficit Description    (pt reports fatigue)   Activity Tolerance   Activity Tolerance Patient limited by fatigue;Patient limited by pain  (expressed some lower abdominal discomfort at end tx )   Assessment   Prognosis Good   Problem List Decreased strength;Decreased range of motion;Decreased endurance; Impaired balance;Decreased mobility; Decreased safety awareness; Obesity; Decreased skin integrity;Pain   Assessment Pt seen in room for assessment in bed mobility  Assistance for LE's required and education on progression to sidelye to decrease stress to abdominal region due to recent surgery  Pt noted to have decreased activity tolerance  Note prior labs with low Hgb  No c/o dizziness today  Pt gait with decreased wisam  She was able to perform elevations today with bilateral UE support  Pt did report that she has an open banister half way up with wall remaining way at home to second floor  She express that she does hold onto the wall as well when descending at home so step performance for FF may be with BUE  Pt was returned to room to recliner chair with legs requested to be in dependent position  Continue with progression of mobility as tolerated      Barriers to Discharge Inaccessible home environment;Decreased caregiver support   Goals   Patient Goals to get back to normal   STG Expiration Date 09/08/20   Short Term Goal #1 see grid   PT Treatment Day 2   Plan   Treatment/Interventions Functional transfer training;LE strengthening/ROM; Elevations; Therapeutic exercise; Endurance training;Patient/family training;Bed mobility;Gait training; Compensatory technique education;Spoke to nursing   Progress Slow progress, decreased activity tolerance   PT Frequency Other (Comment)  (5-7x/wk)     Goals STG achieved within 2 weeks Performance at Initial Evaluation (8/25/2020) Last date completed      Bed mobility skills including rolling and repositioning to prevent skin breakdown and decrease caregiver burden            modified independent assistance               8/26            Supine to sit transitions to increase ease of transfer, allow pt to get out of bed, and decrease caregiver burden          modified independent assistance               8/26      Sit to supine transitions to increase ease of transfer, allow pt to get back into bed, and decrease caregiver burden          modified independent assistance               8/26      Functional transfers to facilitate safe return to previous living environment        modified independent assistance    supervision assistance x1    8/26      Ambulation with least restrictive AD; including at least 2 turns for safe household distance ambulation          modified independent assistance       supervision assistance x1       8/26      Ascend/descend a full flight of steps with left handrail, with device prn, for safe access to previous living environment          modified independent assistance       Not attempted 2* fatigue          8/26      Ascend/descend a curb step, using appropriate AD and method, no handrails, for safe access to previous living environment          modified independent assistance       Not attempted 2* fatigue          8/26      Improve standing functional balance to allow for pt to  object from floor            modified independent assistance       Not attempted 2* fatigue          8/25      Katlyn Franks, PT                       OT NOTES:    Subjective/Goals: "I just have so much on my mind"     Vitals: stable throughout session      Pain: no reports of pain      Treatment Time: (124-101)  63 minutes     Cognition: WFL     Precautions: contact/isolation, fall risk (ostomy)     EVALUATION information:  · OT Goal expiration date: 9/8/2020  · Treatment day: 2  · Discharge recommendation: return home with previous social support  ·    · HOME SET UP:  ? House- 2 level with stairs to enter with rails  ? Bed and bath on 2nd  ? Laundry in basement  ? Walk in shower _ grab bars and built in shower seat  ? DME:  Page Coins and cane  ? Lives with sone and assist from family PRN-- son with autism   ? + drives     Patient education: DEEP BREATHING TECHNIQUES T/O ACTIVITY, ENERGY CONSERVATION TECHNIQUES FOR CARRY OVER UPON D/C, INCREASED FAMILY SUPPORT, CONTINUE PARTICIPATION IN SELF-CARE/MOBILITY WITH STAFF WHILE IN [facility] , OVERALL SAFETY AWARENESS, FALL PREVENTION and ENERGY CONSERVATION      Additional comments:     Assessment/Additional session details:  Patient seen this date for OT with focus on goals as set by OTR  Patient agreeable to skilled OT session with focus on ADL's (bathing, dressing, toileting), Transfers (STS, SPT), Standing tolerance/balance, Strength/ROM/HEP, Activity tolerance, Education on safety, fall prevention and energy conservation techniques, Item retrieval/safe transport with DME ed, ADL AE training, Bathroom/kitchen/home mobility and Fine motor coordination/hand strength     Barriers to treatment include fatigue, shortess of breath, social/family support, decreased strength/coordination, balance , activity tolerance and standing tolerance  Patient educated on safe functional transfers techniques, the appropriate use of AE/DME to improve functional performance, and activity modification techniques for energy conservation  Plans for d/c are home with home OT and family support  Patient is making gains toward OT goals with continued OT recommended at this time to max tolerance, safety and function for appropriate d/c planning  At end of session patient remains in room seated at recliner with all needs within reach     Patient seen for an ADL this AM via sponge bath  Session focused on eating (Ind ), oral hygiene (Ind ), toileting (S), bathing (S), UB dressing (Ind ), LB dressing (Min/Mod A), and don/doff footwear (Max A)  Patient ed on AE to increase LE management    Patient down on self and present abilities-- ed and encouragement provided           Goals STG achieved within 2 weeks Performance at Initial Evaluation (08/25) Current Performance (last date completed)   Grooming while standing at sink Kay/I Supervision  8/26 Supervision     ADL transfers  Kay/I CGA  8/26 STS and SPT supervision    Bathroom mobility  Kay/I CGA with RW  8/26 Supervision + RW   UB ADL  Kay/I Nathan  8/26 Bathing MI; dressing MI    LB ADL, AE PRN Kay/I modA  8/26 min assist with AE ed except socks max assist    Toileting/clothing management and hygiene Kay/I modA  8/26 hygiene supervision; CM supervision (min for thoroughness when fatigued)   Dynamic standing balance Fair + Fair -  8/26 Fair   Increase standing tolerance for inc'd safety with standing purposeful tasks > 10 minutes ~ 4 minutes  8/26 5 min + SOB    Participate in therex 1-3x/week for inc'd overall stamina/activity tolerance for purposeful tasks To complete    8/26 with ADL activity   Shower stall transfer with grab bar Kay/I       Kitchen mobility for inc'd independence and safety negotiating kitchen environment Kay/I   8/26 simulated in room superivsion     Light meal prep Kya/I       Bed mobility with HOB flat  Kay/I           Alysia Benitez                DISCHARGE PLANNING:      Prior to hospitalization, pt lived with her son John Shepherd in a [address] [facility] with 6-7 MARLON with bedroom/bathroom on the second story with 12 MARLON  Colletta Morgans is disabled but can provide limited help with meals  Other son Renetta Ovalles staying with Colletta Morgans while pt is hospitalized  Pt was independent with ADLs prior to hospitalization and drove  She has a built in shower seat,  and [facility] which she did not need  No hx of MH or D&A abuse  PCP is Ravinder Mosley and pharmacy is [facility] on [address]  SW will continue to follow at this time       [facility] scheduled with patient's son Renetta Ovalles Friday at 3:00 PM by phone         Revision History

## 2020-08-27 NOTE — OCCUPATIONAL THERAPY NOTE
Occupational Therapy Treatment Note     Name:  Luis Garvin   MRN:   [id number]  Age:     77 y o  Patient Active Problem List   Diagnosis    Chronic saddle pulmonary embolism (HCC)    Stage 4 chronic kidney disease (HCC)    Bladder mass    Small bowel obstruction (HCC)    Obstructive uropathy    Secondary hyperparathyroidism of renal origin ([facility] )    Hypothyroidism    Hypertension    Polycythemia vera ([facility] )    Fall    Subdural hematoma, acute (HCC)    Intraventricular hemorrhage (HCC)    Diarrhea    Recurrent Clostridium difficile diarrhea    Anemia in CKD (chronic kidney disease)    Mass of urethra    Stage 5 chronic kidney disease not on chronic dialysis ([facility] )    Thoracic compression fracture (HCC)    Hematuria    Abnormal finding on MRI of brain    Benign neoplasm of supratentorial region of brain ([facility] )    Meningioma ([facility] )    UTI (urinary tract infection)    Herpes zoster    Inverted T wave    Polycythemia    Encounter for surgical aftercare following surgery on the digestive system    History of Clostridioides difficile colitis    History of shingles    Depression     S/P hernia repair [Y60 522, Z87 [id number]]        Subjective/Goals: "I'm tired today"-- patient pleasant and cooperative and remains well motivated      Vitals: denies any dizziness or lightheadedness     Pain: reports getting medication for some gas which is uncomfortable but denies any real pain   + general fatigue        Treatment Time: (5533-5035) 53 minutes     Cognition: WFL     Precautions: contact/isolation, fall risk (ostomy)     EVALUATION information:  · OT Goal expiration date: 9/8/2020  · Treatment day: 3  · Discharge recommendation: return home with previous social support  ·    · HOME SET UP:  ? House- 2 level with stairs to enter with rails  ? Bed and bath on 2nd  ? Laundry in basement  ? Walk in shower _ grab bars and built in shower seat  ? DME:  Tee Tristan  ?  Lives with son and assist from family PRN-- son with autism   ? + drives     Patient education: DEEP BREATHING TECHNIQUES T/O ACTIVITY, ENERGY CONSERVATION TECHNIQUES FOR CARRY OVER UPON D/C, INCREASED FAMILY SUPPORT, CONTINUE PARTICIPATION IN SELF-CARE/MOBILITY WITH STAFF WHILE IN [facility] , OVERALL SAFETY AWARENESS, FALL PREVENTION and ENERGY CONSERVATION      Additional comments:     Assessment/Additional session details:  Patient seen this date for OT with focus on goals as set by OTR  Patient agreeable to skilled OT session with focus on Transfers (STS, SPT), Standing tolerance/balance, Strength/ROM/HEP, Tub/shower transfers, Home management, Activity tolerance, Education on safety, fall prevention and energy conservation techniques, Item retrieval/safe transport with DME ed, ADL AE training and Bathroom/kitchen/home mobility  Barriers to treatment include fatigue, shortess of breath, home environment (management in/out or throughout home), social/family support, decreased strength/coordination, balance , activity tolerance and standing tolerance  Patient educated on safe functional transfers techniques, the appropriate use of AE/DME to improve functional performance, and activity modification techniques for energy conservation  Plans for d/c are home with home OT and family support  Patient is making gains toward OT goals with continued OT recommended at this time to max tolerance, safety and function for appropriate d/c planning  At end of session patient remains in room seated at recliner with all needs within reach  Patient's main complaint is overall general fatigue  Patient progressing in therapy skill sets with increased break needed between functional tasks    Continued ed on energy conservation and fall prevention along with DME ed for item retrieval and transport currently recommending a walker tray         Goals STG achieved within 2 weeks Performance at Initial Evaluation (08/25) Current Performance (last date completed)   Grooming while standing at sink Lana/I  8/27 Supervision  8/27 MI  8/26 Supervision     ADL transfers  Lana/I CGA 8/27 STS MI and DS SPT-- good safety + RW   8/26 STS and SPT supervision    Bathroom mobility  Lana/I CGA with RW  8/27 DSupervision + RW   UB ADL  Lana/I Nathan  8/26 Bathing MI; dressing MI    LB ADL, AE PRN Lana/I modA  8/26 min assist with AE ed except socks max assist    Toileting/clothing management and hygiene Lana/I modA  8/26 hygiene supervision; CM supervision (min for thoroughness when fatigued)   Dynamic standing balance Fair + Fair -  8/27 Fair/Fair+    Increase standing tolerance for inc'd safety with standing purposeful tasks > 10 minutes ~ 4 minutes  8/27 5 min + SOB    Participate in therex 1-3x/week for inc'd overall stamina/activity tolerance for purposeful tasks To complete  8/27 8/27 2# weighted ball in all planes with good tolerance for 2x10    Shower stall transfer with grab bar Lana/I   8/27 Supervision     Kitchen mobility for inc'd independence and safety negotiating kitchen environment Lana/I   8/27 simulated in room DS +RW     Light meal prep Lana/I   8/27 simulated in room DS + walker tray    Bed mobility with HOB flat  Lana/I    8/27 Supervision      Pedro Blanc  8/27/2020

## 2020-08-28 ENCOUNTER — HOSPITAL ENCOUNTER (OUTPATIENT)
Dept: INFUSION CENTER | Facility: HOSPITAL | Age: 75
Discharge: HOME | End: 2020-08-28
Payer: COMMERCIAL

## 2020-08-28 VITALS
SYSTOLIC BLOOD PRESSURE: 120 MMHG | DIASTOLIC BLOOD PRESSURE: 67 MMHG | RESPIRATION RATE: 16 BRPM | TEMPERATURE: 97.8 F | HEART RATE: 76 BPM | OXYGEN SATURATION: 98 %

## 2020-08-28 LAB
ABO GROUP BLD: NORMAL
ANISOCYTOSIS BLD QL SMEAR: PRESENT
BLD GP AB SCN SERPL QL: NEGATIVE
DATE SPECIMEN #1: NORMAL
ERYTHROCYTE [DISTWIDTH] IN BLOOD BY AUTOMATED COUNT: 15.6 %
HCT VFR BLD AUTO: 20.1 % (ref 36–46)
HEMOCCULT SP1 STL QL: NEGATIVE
HGB BLD-MCNC: 6.6 G/DL (ref 12–16)
LYMPHOCYTES # BLD AUTO: 1.1 THOUSAND/UL (ref 0.5–4)
LYMPHOCYTES # BLD AUTO: 9 % (ref 25–45)
MCH RBC QN AUTO: 30.3 PG (ref 26–34)
MCHC RBC AUTO-ENTMCNC: 32.8 G/DL (ref 31–36)
MCV RBC AUTO: 93 FL (ref 80–100)
METAMYELOCYTES NFR BLD MANUAL: 3 % (ref 0–1)
MONOCYTES # BLD AUTO: 0.12 THOUSAND/UL (ref 0.2–0.9)
MONOCYTES NFR BLD AUTO: 1 % (ref 1–10)
MYELOCYTES NFR BLD MANUAL: 1 % (ref 0–1)
NEUTS BAND NFR BLD MANUAL: 6 % (ref 0–8)
NEUTS SEG # BLD: 10.49 THOUSAND/UL (ref 1.8–7.8)
NEUTS SEG NFR BLD AUTO: 80 %
PLATELET # BLD AUTO: 411 THOUSANDS/UL (ref 150–450)
PLATELET BLD QL SMEAR: ABNORMAL
PMV BLD AUTO: 8.4 FL (ref 8.9–12.7)
POLYCHROMASIA BLD QL SMEAR: PRESENT
RBC # BLD AUTO: 2.18 MILLION/UL (ref 4–5.2)
RBC MORPH BLD: ABNORMAL
RH BLD: POSITIVE
SPECIMEN EXPIRATION DATE: NORMAL
TOTAL CELLS COUNTED SPEC: 100
WBC # BLD AUTO: 12.2 THOUSAND/UL (ref 4.5–11)

## 2020-08-28 PROCEDURE — 86850 RBC ANTIBODY SCREEN: CPT | Performed by: INTERNAL MEDICINE

## 2020-08-28 PROCEDURE — P9016 RBC LEUKOCYTES REDUCED: HCPCS

## 2020-08-28 PROCEDURE — 82270 OCCULT BLOOD FECES: CPT | Performed by: INTERNAL MEDICINE

## 2020-08-28 PROCEDURE — 36430 TRANSFUSION BLD/BLD COMPNT: CPT

## 2020-08-28 PROCEDURE — 97116 GAIT TRAINING THERAPY: CPT

## 2020-08-28 PROCEDURE — 86900 BLOOD TYPING SEROLOGIC ABO: CPT | Performed by: INTERNAL MEDICINE

## 2020-08-28 PROCEDURE — 86920 COMPATIBILITY TEST SPIN: CPT

## 2020-08-28 PROCEDURE — 86901 BLOOD TYPING SEROLOGIC RH(D): CPT | Performed by: INTERNAL MEDICINE

## 2020-08-28 PROCEDURE — 85007 BL SMEAR W/DIFF WBC COUNT: CPT | Performed by: INTERNAL MEDICINE

## 2020-08-28 PROCEDURE — 99308 SBSQ NF CARE LOW MDM 20: CPT | Performed by: INTERNAL MEDICINE

## 2020-08-28 PROCEDURE — 85027 COMPLETE CBC AUTOMATED: CPT | Performed by: INTERNAL MEDICINE

## 2020-08-28 RX ADMIN — CITALOPRAM HYDROBROMIDE 20 MG: 20 TABLET ORAL at 09:34

## 2020-08-28 RX ADMIN — APIXABAN 2.5 MG: 2.5 TABLET, FILM COATED ORAL at 09:34

## 2020-08-28 RX ADMIN — APIXABAN 2.5 MG: 2.5 TABLET, FILM COATED ORAL at 17:39

## 2020-08-28 RX ADMIN — LEVOTHYROXINE SODIUM 75 MCG: 75 TABLET ORAL at 05:16

## 2020-08-28 RX ADMIN — METOPROLOL TARTRATE 25 MG: 50 TABLET, FILM COATED ORAL at 09:34

## 2020-08-28 RX ADMIN — MELATONIN TAB 3 MG 3 MG: 3 TAB at 21:15

## 2020-08-28 RX ADMIN — RUXOLITINIB 10 MG: 10 TABLET ORAL at 09:34

## 2020-08-28 NOTE — ASSESSMENT & PLAN NOTE
Continue metoprolol tartrate  Was on nifedipine will resume if blood pressure above 130/80 given CKD and recent LUANN

## 2020-08-28 NOTE — PROGRESS NOTES
Progress Note - Pam Guzman [birth year], 77 y o  female MRN: [id number]    Unit/Bed#: -01 Encounter: [id number]    Primary Care Provider: Ronak Tyson MD   Date and time admitted to hospital: 8/24/2020  7:13 PM        * Encounter for surgical aftercare following surgery on the digestive system  Assessment & Plan  Patient had prolonged hospitalization for 2 weeks  She was admitted and [facility] from August 10 through August 24th for incarcerated peristomal hernia at the ileal conduct side  She is status post ex laparotomy and kirt stomal hernia repair with mesh and lysis of adhesions  He had postoperative difficulty with bowel function requiring bowel rest and NG tube placement which is resolved  IR placed IJ line due to difficulty accessing peripheral IV  Following the surgery patient was found to be severely deconditioned with inability to resume activity of daily living due to weakness and decreased endurance  Therefore recommended to be admitted at rehab for further strengthening  Progressing with physical therapy and occupational therapy  Polycythemia vera ([address] )  Assessment & Plan  · Follow-up with heme oncology  · Has been on Jakafi 10 mg daily will continue -patient brought from home    Hypertension  Assessment & Plan  Continue metoprolol tartrate  Was on nifedipine will resume if blood pressure above 130/80 given CKD and recent LUANN  Hypothyroidism  Assessment & Plan  Continue levothyroxine 75 mcg daily    Obstructive uropathy  Assessment & Plan  · This is a chronic problem  She follow-up with urology 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 elevated bladder pressure leading to hydronephrosis and CKD  · Last seen urology July 2019 was scheduled for yearly follow-up      Stage 4 chronic kidney disease (HCC)  Assessment & Plan  · Creatinine baseline 2-3 currently within baseline  · She has functional solitary right kidney with chronic 8 mm stone in the lower pole  · Outpatient follow-up with Nephrology September 24 20   · Intermittent IV fluids for worsening renal function  · Aggressively treat any evidence urinary tract infection given solitary right function kidney  Chronic saddle pulmonary embolism (HCC)  Assessment & Plan  · Continue home dose Eliquis 2 5 mg b i d  Unclear why reduce does given her age and weight does not meet criteria  · She does have history of subdural hematoma in the past  · Patient follow-up with heme oncology given history of polycythemia vera  History of shingles  Assessment & Plan  · About 2 weeks ago she had dermatomal skin lesion consistent with shingles on the right gluteus region lesions are crusted no longer need isolation  There is no post herpetic neuralgia  History of Clostridioides difficile colitis  Assessment & Plan  · Last infection 2019  · Treated with vancomycin prophylaxis while on Keflex in recent hospitalization  · Completed prophylaxis vancomycin 48 hours following cessation of antibiotic    Anemia in CKD (chronic kidney disease)  Assessment & Plan  · Hemoglobin baseline 9-10 most recent hemoglobin prior to hospital discharge 7 4  · Hemoglobin 6 6 symptomatic with lightheadedness  · Iron panel borderline for iron deficiency  · Type and screen  · Transfused 2 units of packed red blood cells 1 unit today at the infusion center and 1 unit on 08/31/2020 discussed with infusion center per scheduling  · Check fecal occult blood    Depression  Assessment & Plan  Continue citalopram 20 mg daily        VTE Pharmacologic Prophylaxis:   Pharmacologic: Apixaban (Eliquis)  Mechanical VTE Prophylaxis in Place: No    Patient Centered Rounds: I have performed bedside rounds with nursing staff today  Discussions with Specialists or Other Care Team Provider:  Discussed with nursing staff      Education and Discussions with Family / Patient:  Discussed with son Renetta Ovalles over the phone      Time Spent for Care: 20 minutes  More than 50% of total time spent on counseling and coordination of care as described above  Current Length of Stay: 4 day(s)    Current Patient Status: SNF Short Term Inpatient   Certification Statement: The patient will continue to require additional inpatient hospital stay due to Ongoing physical and occupational therapy given deconditioning    Discharge Plan:  Anticipate discharge next week home  Code Status: Level 1 - Full Code      Subjective:   Patient seen and examined  She feel lightheaded  Otherwise denied shortness of breath  Objective:     Vitals:   Temp (24hrs), Av 7 °F (36 5 °C), Min:97 3 °F (36 3 °C), Max:98 °F (36 7 °C)    Temp:  [97 3 °F (36 3 °C)-98 °F (36 7 °C)] 98 °F (36 7 °C)  HR:  [70-96] 70  Resp:  [16] 16  BP: ()/(54-97) 100/60  SpO2:  [96 %-98 %] 98 %  Body mass index is 37 59 kg/m²  Input and Output Summary (last 24 hours): Intake/Output Summary (Last 24 hours) at 2020 1451  Last data filed at 2020 0934  Gross per 24 hour   Intake 600 ml   Output 1075 ml   Net -475 ml       Physical Exam:     Physical Exam  Constitutional:       Appearance: Normal appearance  She is obese  She is ill-appearing  She is not toxic-appearing or diaphoretic  HENT:      Head: Normocephalic and atraumatic  Nose: No rhinorrhea  Mouth/Throat:      Mouth: Mucous membranes are moist       Pharynx: No oropharyngeal exudate or posterior oropharyngeal erythema  Eyes:      General:         Right eye: No discharge  Left eye: No discharge  Cardiovascular:      Rate and Rhythm: Normal rate and regular rhythm  Heart sounds: Normal heart sounds  No murmur  Pulmonary:      Effort: Pulmonary effort is normal  No respiratory distress  Breath sounds: Normal breath sounds  No stridor  No wheezing or rales  Abdominal:      General: Bowel sounds are normal       Palpations: Abdomen is soft        Tenderness: There is abdominal tenderness (Mild tenderness around surgical site)  Comments: Vertical surgical incision with staples  Mild serosanguineous fluid elicited by deep palpation  Right lower quadrant urinary diversion ostomy back with yellowish liquid   Musculoskeletal:      Right lower leg: No edema  Left lower leg: No edema  Skin:     General: Skin is dry  Neurological:      Mental Status: She is alert and oriented to person, place, and time  Psychiatric:         Behavior: Behavior normal       Comments: Flat affect           Additional Data:     Labs:    Results from last 7 days   Lab Units 08/28/20  1030 08/23/20  0513   WBC Thousand/uL 12 20* 8 25   HEMOGLOBIN g/dL 6 6* 7 4*   HEMATOCRIT % 20 1* 24 1*   PLATELETS Thousands/uL 411 202   BANDS PCT % 6 1   LYMPHO PCT % 9* 5*   MONO PCT % 1 6   EOS PCT %  --  1     Results from last 7 days   Lab Units 08/24/20  0506   SODIUM mmol/L 137   POTASSIUM mmol/L 4 1   CHLORIDE mmol/L 107   CO2 mmol/L 22   BUN mg/dL 38*   CREATININE mg/dL 3 10*   ANION GAP mmol/L 8   CALCIUM mg/dL 8 1*   GLUCOSE RANDOM mg/dL 91         Results from last 7 days   Lab Units 08/27/20  1431 08/25/20  1650 08/25/20  1148   POC GLUCOSE mg/dl 140 99 93                   * I Have Reviewed All Lab Data Listed Above  * Additional Pertinent Lab Tests Reviewed: All Labs Within Last 24 Hours Reviewed    Imaging:    Imaging Reports Reviewed Today Include:  No imaging  Imaging Personally Reviewed by Myself Includes:  No imaging      Recent Cultures (last 7 days):           Last 24 Hours Medication List:   Current Facility-Administered Medications   Medication Dose Route Frequency Provider Last Rate    apixaban  2 5 mg Oral BID Rupali S Elbert, CRNP      citalopram  20 mg Oral Daily Rupali S Elbert, CRNP      levothyroxine  75 mcg Oral Early Morning Rupali S Elbert, CRNP      melatonin  3 mg Oral HS Rupali S Elbert, CRNP      metoprolol tartrate  25 mg Oral Q12H [facility] Rupali S Elbert, CRNP      oxyCODONE  2 5 mg Oral Q4H PRN Rupali S Elbert, CRNP      oxyCODONE  5 mg Oral Q4H PRN Rupali S Elbert, CRNP      ruxolitinib  10 mg Oral Daily Rupali S Elbert, CRNP      simethicone  80 mg Oral Q6H PRN Rupali S Elbert, CRNP          Today, Patient Was Seen By: Chuy Holliday MD    ** Please Note: Dictation voice to text software may have been used in the creation of this document   **

## 2020-08-28 NOTE — PHYSICAL THERAPY NOTE
Attempted to see pt for PT, but pt reports feeling very weak  Pt questioning if she will be getting blood, and reports this was talked about last evening       Tamara Hernandez, PTA

## 2020-08-28 NOTE — OCCUPATIONAL THERAPY NOTE
OCCUPATIONAL THERAPY CANCELLATION NOTE  Chart reviewed  OT attempted to see, however patient off the unit receiving infusion  Per SW, patient's [facility] rescheduled for next week  Will continue to follow to be seen for OT treatment as appropriate/when medically cleared  Miguel A HERRING,OTR/L

## 2020-08-28 NOTE — ASSESSMENT & PLAN NOTE
03/05/18 1400   Activity/Group Therapy Checklist   Group Relapse Prevention   Attendance Attended   Follows Direction Followed directions   Group Interactions/Observations Interacted appropriately;Alert;Sharing   Affect/Mood Range Normal range   Affect/Mood Display Appropriate   Goal Progression Progressing      · Hemoglobin baseline 9-10 most recent hemoglobin prior to hospital discharge 7 4  · Hemoglobin 6 6 symptomatic with lightheadedness  · Iron panel borderline for iron deficiency  · Type and screen  · Transfused 2 units of packed red blood cells 1 unit today at the infusion center and 1 unit on 08/31/2020 discussed with infusion center per scheduling    · Check fecal occult blood

## 2020-08-28 NOTE — PLAN OF CARE
Problem: PHYSICAL THERAPY ADULT  Goal: Performs mobility at highest level of function for planned discharge setting  See evaluation for individualized goals  Description: Treatment/Interventions: Functional transfer training, LE strengthening/ROM, Elevations, Therapeutic exercise, Endurance training, Patient/family training, Equipment eval/education, Bed mobility, Gait training, Spoke to nursing, Spoke to case management  Equipment Recommended: Walker(progress to LRAD)       See flowsheet documentation for full assessment, interventions and recommendations  Outcome: Progressing  Note: Prognosis: Good  Problem List: Decreased strength, Impaired balance, Decreased endurance, Decreased mobility  Assessment: PT seen for PT session  PT reports feeling weak 2* needing blood and having decreased HGB  PT performed LE exercise per flow sheet, and assisted pt to w/c 2* pt going to the infusion center for blood  Barriers to Discharge: Inaccessible home environment, Decreased caregiver support     PT Discharge Recommendation: [id number]          See flowsheet documentation for full assessment

## 2020-08-28 NOTE — ASSESSMENT & PLAN NOTE
· Follow-up with heme oncology  · Has been on Jakafi 10 mg daily will continue -patient brought from home

## 2020-08-28 NOTE — PROGRESS NOTES
Pt here from [facility] for one unit of blood  Tolerated well without complications    Confirmed that patient will be back at noon on Monday 8-31-20 for the second unit to [address] [place] [address]

## 2020-08-28 NOTE — PHYSICAL THERAPY NOTE
15 minute treatment       08/28/20 1143   Pain Assessment   Pain Assessment Tool Pain Assessment not indicated - pt denies pain   Pain Score No Pain   Restrictions/Precautions   Weight Bearing Precautions Per Order No   Other Precautions Fall Risk   General   Chart Reviewed Yes   Response to Previous Treatment Patient with no complaints from previous session  Cognition   Overall Cognitive Status WFL   Subjective   Subjective Pt reports feeling weak  Pt questioning if she was gettting blood   Transfers   Sit to Stand 4  Minimal assistance   Additional items Assist x 1;Verbal cues  (Assist provided 2* low HGB)   Stand to Sit 4  Minimal assistance   Additional items Assist x 1   Stand pivot 4  Minimal assistance   Additional items Assist x 1; Increased time required  (Assist provided 2* low HGB)   Endurance Deficit   Endurance Deficit Yes   Endurance Deficit Description weakness 2* low HGB   Activity Tolerance   Activity Tolerance Treatment limited secondary to medical complications (Comment)  (low HGB)   Exercises   Heelslides 15 reps;AROM; Bilateral  (reclined)   Knee AROM Long Arc Quad Sitting;10 reps;AROM; Right   Assessment   Prognosis Good   Problem List Decreased strength; Impaired balance;Decreased endurance;Decreased mobility   Assessment PT seen for PT session  PT reports feeling weak 2* needing blood and having decreased HGB    PT performed LE exercise per flow sheet, and assisted pt to w/c 2* pt going to the infusion center for blood   Goals   Patient Goals none stated   STG Expiration Date 09/08/20   PT Treatment Day 4   Plan   Treatment/Interventions   (COntinue per plan of care)   Progress Progressing toward goals   PT Frequency   (5-7x/week)     Goals STG achieved within 2 weeks Performance at Initial Evaluation (8/25/2020) Last date completed      Bed mobility skills including rolling and repositioning to prevent skin breakdown and decrease caregiver burden            modified independent assistance               8/27            Supine to sit transitions to increase ease of transfer, allow pt to get out of bed, and decrease caregiver burden          modified independent assistance               8/27   pt uses R side of bed at home      Sit to supine transitions to increase ease of transfer, allow pt to get back into bed, and decrease caregiver burden          modified independent assistance               8/27      Functional transfers to facilitate safe return to previous living environment        modified independent assistance    supervision assistance x1    8/28      Ambulation with least restrictive AD; including at least 2 turns for safe household distance ambulation          modified independent assistance       supervision assistance x1       8/27      Ascend/descend a full flight of steps with left handrail, with device prn, for safe access to previous living environment          modified independent assistance       Not attempted 2* fatigue          8/27      Ascend/descend a curb step, using appropriate AD and method, no handrails, for safe access to previous living environment          modified independent assistance       Not attempted 2* fatigue          8/27      Improve standing functional balance to allow for pt to  object from floor            modified independent assistance       Not attempted 2* fatigue          8/25   Michael Hernandez, PTA

## 2020-08-28 NOTE — NURSING NOTE
Late note:    Resident very weak this am with transferring to bedside commode kept leaning forward needed 2 moderate assists to transfer  States she feels weak and was wondering what her hemoglobin was  Dr Drake Puente paged and notified new orders noted  Hemoglobin 6 6 and Dr Drake Puente notified new orders noted for transfusion  Per infusion center unable to give 2 units today 1 unit today and 1 unit Monday Dr Drake Puente notified  Transferred to infusion center via wheelchair at [phone]  Ileoconduit patent for yellow urine  Midabdomen  Dressing clean dry intact

## 2020-08-28 NOTE — ASSESSMENT & PLAN NOTE
Patient had prolonged hospitalization for 2 weeks  She was admitted and [facility] from August 10 through August 24th for incarcerated peristomal hernia at the ileal conduct side  She is status post ex laparotomy and kirt stomal hernia repair with mesh and lysis of adhesions  He had postoperative difficulty with bowel function requiring bowel rest and NG tube placement which is resolved  IR placed IJ line due to difficulty accessing peripheral IV  Following the surgery patient was found to be severely deconditioned with inability to resume activity of daily living due to weakness and decreased endurance  Therefore recommended to be admitted at rehab for further strengthening  Progressing with physical therapy and occupational therapy

## 2020-08-28 NOTE — ASSESSMENT & PLAN NOTE
· Last infection 2019  · Treated with vancomycin prophylaxis while on Keflex in recent hospitalization  · Completed prophylaxis vancomycin 48 hours following cessation of antibiotic

## 2020-08-28 NOTE — NURSING NOTE
Returned from infusion center at 1600  Offers no complaints  Call bell within reach  Unknown if ever smoked

## 2020-08-28 NOTE — PLAN OF CARE
Problem: Potential for Falls  Goal: Patient will remain free of falls  Description: INTERVENTIONS:  - Assess patient frequently for physical needs  -  Identify cognitive and physical deficits and behaviors that affect risk of falls    -  [place] fall precautions as indicated by assessment   - Educate patient/family on patient safety including physical limitations  - Instruct patient to call for assistance with activity based on assessment  - Modify environment to reduce risk of injury  - Consider OT/PT consult to assist with strengthening/mobility  Outcome: Progressing

## 2020-08-28 NOTE — NURSING NOTE
The pt alert and oriented pale  The pt ambulated from the BR to her bed and co of dizziness  The CNA x2 placed the pt in bed  The pts B/P was taken and it was 84/50  Neurocheck done  The pts pupils are even and reactive  The pts elina  is strong and the pt continues to be alert and oriented  Dr Rory Sparks contacted and assessed the pt  Reassessed the pts B/P +137/72  The MD Stated, "the pt agreed to a midline placement tomorrow then I will order an Hand H to monitor the pts Hemoglobin

## 2020-08-29 PROCEDURE — 97530 THERAPEUTIC ACTIVITIES: CPT

## 2020-08-29 PROCEDURE — 97116 GAIT TRAINING THERAPY: CPT | Performed by: PHYSICAL THERAPIST

## 2020-08-29 PROCEDURE — 97535 SELF CARE MNGMENT TRAINING: CPT

## 2020-08-29 PROCEDURE — 97110 THERAPEUTIC EXERCISES: CPT | Performed by: PHYSICAL THERAPIST

## 2020-08-29 RX ADMIN — METOPROLOL TARTRATE 25 MG: 50 TABLET, FILM COATED ORAL at 21:04

## 2020-08-29 RX ADMIN — METOPROLOL TARTRATE 25 MG: 50 TABLET, FILM COATED ORAL at 08:37

## 2020-08-29 RX ADMIN — MELATONIN TAB 3 MG 3 MG: 3 TAB at 21:04

## 2020-08-29 RX ADMIN — APIXABAN 2.5 MG: 2.5 TABLET, FILM COATED ORAL at 08:37

## 2020-08-29 RX ADMIN — APIXABAN 2.5 MG: 2.5 TABLET, FILM COATED ORAL at 18:16

## 2020-08-29 RX ADMIN — SIMETHICONE CHEW TAB 80 MG 80 MG: 80 TABLET ORAL at 19:29

## 2020-08-29 RX ADMIN — CITALOPRAM HYDROBROMIDE 20 MG: 20 TABLET ORAL at 08:37

## 2020-08-29 RX ADMIN — LEVOTHYROXINE SODIUM 75 MCG: 75 TABLET ORAL at 05:09

## 2020-08-29 RX ADMIN — RUXOLITINIB 10 MG: 10 TABLET ORAL at 08:38

## 2020-08-29 NOTE — PHYSICAL THERAPY NOTE
Pt was seen for 40 min of individual tx      08/29/20 1010   Pain Assessment   Pain Assessment Tool Pain Assessment not indicated - pt denies pain   Restrictions/Precautions   Weight Bearing Precautions Per Order No   Other Precautions Fall Risk   General   Chart Reviewed Yes   Response to Previous Treatment Patient with no complaints from previous session  Family/Caregiver Present No   Cognition   Overall Cognitive Status WFL   Arousal/Participation Alert; Cooperative   Attention Within functional limits   Orientation Level Oriented X4   Memory Within functional limits   Following Commands Follows all commands and directions without difficulty   Comments   (pt feels better today, willing to participate in tx)   Subjective   Subjective   (sitting in recliner, some fatigue verbalized)   Transfers   Sit to Stand 4  Minimal assistance   Additional items Assist x 1; Armrests; Increased time required;Verbal cues   Stand to Sit 4  Minimal assistance   Additional items Assist x 1; Armrests; Verbal cues   Stand pivot 4  Minimal assistance   Additional items Assist x 1;Verbal cues   Ambulation/Elevation   Gait pattern Foward flexed; Short stride; Excessively slow   Gait Assistance 5  Supervision   Additional items Assist x 1;Verbal cues   Assistive Device Rolling walker   Distance 30'x2   Balance   Static Sitting Good   Dynamic Sitting Fair +   Static Standing Fair +   Dynamic Standing Fair   Ambulatory Fair   Endurance Deficit   Endurance Deficit Yes   Activity Tolerance   Activity Tolerance Patient tolerated treatment well;Patient limited by fatigue   Exercises   Hip Adduction Sitting;20 reps;Bilateral  (pillow use)   Knee AROM Sitting;20 reps;Bilateral   Ankle Pumps Sitting;20 reps;Bilateral   Marching Sitting;Standing;10 reps;Bilateral   Balance training    (standing with RW use - weight shift side to side)   Assessment   Prognosis Good   Problem List Decreased strength;Decreased endurance; Impaired balance;Decreased mobility;Obesity   Assessment   (Pt seble tx well, some fatigue, motivated, focus on amb today )   Barriers to Discharge Inaccessible home environment;Decreased caregiver support   Goals   Patient Goals   (to walk, so I can go home)   STG Expiration Date 09/08/20   Short Term Goal #1 see grid   PT Treatment Day 5   Plan   Progress Progressing toward goals   Recommendation   PT Discharge Recommendation   (as per primary PT recomm )   Equipment Recommended Walker   PT - OK to Discharge No     Goals STG achieved within 2 weeks Performance at Initial Evaluation (8/25/2020) Last date completed      Bed mobility skills including rolling and repositioning to prevent skin breakdown and decrease caregiver burden            modified independent assistance               8/27            Supine to sit transitions to increase ease of transfer, allow pt to get out of bed, and decrease caregiver burden          modified independent assistance               8/27   pt uses R side of bed at home      Sit to supine transitions to increase ease of transfer, allow pt to get back into bed, and decrease caregiver burden          modified independent assistance               8/29      Functional transfers to facilitate safe return to previous living environment        modified independent assistance    supervision assistance x1    8/29      Ambulation with least restrictive AD; including at least 2 turns for safe household distance ambulation          modified independent assistance       supervision assistance x1       8/29      Ascend/descend a full flight of steps with left handrail, with device prn, for safe access to previous living environment          modified independent assistance       Not attempted 2* fatigue          8/27      Ascend/descend a curb step, using appropriate AD and method, no handrails, for safe access to previous living environment          modified independent assistance       Not attempted 2* fatigue          8/27    Improve standing functional balance to allow for pt to  object from floor            modified independent assistance       Not attempted 2* fatigue          8/29

## 2020-08-29 NOTE — OCCUPATIONAL THERAPY NOTE
Occupational Therapy Treatment Note    [facility]    Patient Active Problem List   Diagnosis    Chronic saddle pulmonary embolism (HCC)    Stage 4 chronic kidney disease (HCC)    Bladder mass    Small bowel obstruction (HCC)    Obstructive uropathy    Secondary hyperparathyroidism of renal origin ([facility] [address] )    Hypothyroidism    Hypertension    Polycythemia vera ([facility] [address] [facility]    Fall    Subdural hematoma, acute (HCC)    Intraventricular hemorrhage (HCC)    Diarrhea    Recurrent Clostridium difficile diarrhea    Anemia in CKD (chronic kidney disease)    Mass of urethra    Stage 5 chronic kidney disease not on chronic dialysis ([facility] [address] )    Thoracic compression fracture (HCC)    Hematuria    Abnormal finding on MRI of brain    Benign neoplasm of supratentorial region of brain ([facility] [address] )    Meningioma ([facility] [address] )    UTI (urinary tract infection)    Herpes zoster    Inverted T wave    Polycythemia    Encounter for surgical aftercare following surgery on the digestive system    History of Clostridioides difficile colitis    History of shingles    Depression       Past Medical History:   Diagnosis Date    Anxiety     Chronic kidney disease (CKD), stage IV (severe) (HCC)     stage IV    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    Compression fracture of cervical spine (HCC)     Hydronephrosis     Hypertension     Hypothyroid     Incontinence     Lung mass     Improving on PET/CT 1/2016    Polycythemia vera (HCC)     Pulmonary embolism ([facility] [address] ) 2014    Urinary tract infection      TIME: 9223-0928 30 minutes   VITALS: stable  PAIN: none reported  COGNITION: WFL  PRECAUTIONS: Fall risk, ostomy    EXPIRATION DATE: 9/8/2020  TREATMENT DAY: 4  DISCHARGE RECOMMENDATION: Home with social support  DME NEEDED FOR DISCHARGE:TBD  ADDITIONAL COMMENTS:     SESSION DETAILS / ASSESSMENT:  Treatment focus on ADL txfrs, functional mobility, bathroom mobility, toileting, standing balance and tolerance and activity tolerance  Standing tolerance improvement noted with 7 minute stance during toileting and mobility with RW  Required moderate assistance for posterior hygiene in stance due to amount of soft BM  Demonstrated improved activity tolerance with toileting and no SOB  Demonstrated improvements with STS txfrs, bathroom mobility, standing balance and tolerance and overall safety with tasks presented  Patient educated on the importance of pacing with activities, safe transfer techniques and fall prevention with transfers and functional mobility  Pt limited by endurance, weakness, functional mobility and overall strength  Pt would benefit from continued skilled OT services to address the following deficits: ADLs, functional mobility, shower tranfers, static and dynamic standing balance and tolerance, kitchen mobility and meal prep with use of RW during functional ambulation and overall functional strength and endurance needed to carryout BADL tasks in order for patient to safely discharge to the next level of care  Proceed with POC  Patient left seated in recliner with leg elevated and all needs met with call bell within reach at end of tx session  Pt fatigued this PM and treatment was ended due to fatigue       Goals STG achieved within 2 wks Performance at Initial Evaluation (08/25) Current Performance (last date completed)   Grooming while standing at sink Lana/I  8/27 Supervision  8/27 MI  8/26 Supervision     ADL transfers  Lana/I CGA 8/29 STS MI from recliner, SUP STS from toilet  8/27 STS MI and DS SPT-- good safety + RW   8/26 STS and SPT supervision    Bathroom mobility  Lana/I CGA with RW 8/29 DS with RW   UB ADL  Lana/I Nathan  8/26 Bathing MI; dressing MI    LB ADL, AE PRN Lana/I modA  8/26 min assist with AE ed except socks max assist    Toileting/clothing management and hygiene Lana/I modA 8/29 Mod A for posterior thoroughness   8/26 hygiene supervision; CM supervision (min for thoroughness when fatigued) Dynamic standing balance Fair +   8/29 Fair - 8/29 F+   8/27 Fair/Fair+    Increase standing tolerance for inc'd safety with standing purposeful tasks > 10 minutes ~ 4 minutes 8/29 7 min with toileting and mob   8/27 5 min + SOB    Participate in therex 1-3x/week for inc'd overall stamina/activity tolerance for purposeful tasks To complete  8/27 8/27 2# weighted ball in all planes with good tolerance for 2x10    Shower stall transfer with grab bar Kay/I   8/27 Supervision     Kitchen mobility for inc'd independence and safety negotiating kitchen environment Kay/I   8/27 simulated in room DS +RW     Light meal prep Kay/I   8/27 simulated in room DS + walker tray    Bed mobility with HOB flat  Kay/I    8/27 Supervision     Anisha Palencia MOT,OTR/L

## 2020-08-30 RX ADMIN — CITALOPRAM HYDROBROMIDE 20 MG: 20 TABLET ORAL at 08:25

## 2020-08-30 RX ADMIN — MELATONIN TAB 3 MG 3 MG: 3 TAB at 21:33

## 2020-08-30 RX ADMIN — APIXABAN 2.5 MG: 2.5 TABLET, FILM COATED ORAL at 08:25

## 2020-08-30 RX ADMIN — APIXABAN 2.5 MG: 2.5 TABLET, FILM COATED ORAL at 17:26

## 2020-08-30 RX ADMIN — METOPROLOL TARTRATE 25 MG: 50 TABLET, FILM COATED ORAL at 21:33

## 2020-08-30 RX ADMIN — LEVOTHYROXINE SODIUM 75 MCG: 75 TABLET ORAL at 05:26

## 2020-08-30 RX ADMIN — RUXOLITINIB 10 MG: 10 TABLET ORAL at 08:25

## 2020-08-31 ENCOUNTER — HOSPITAL ENCOUNTER (OUTPATIENT)
Dept: INFUSION CENTER | Facility: HOSPITAL | Age: 75
Discharge: HOME | End: 2020-08-31
Payer: COMMERCIAL

## 2020-08-31 VITALS
DIASTOLIC BLOOD PRESSURE: 77 MMHG | SYSTOLIC BLOOD PRESSURE: 135 MMHG | HEART RATE: 64 BPM | TEMPERATURE: 97.6 F | RESPIRATION RATE: 18 BRPM

## 2020-08-31 LAB
BASOPHILS # BLD AUTO: 0.1 THOUSANDS/ΜL (ref 0–0.1)
BASOPHILS NFR BLD AUTO: 1 % (ref 0–1)
EOSINOPHIL # BLD AUTO: 0 THOUSAND/ΜL (ref 0–0.4)
EOSINOPHIL NFR BLD AUTO: 1 % (ref 0–6)
ERYTHROCYTE [DISTWIDTH] IN BLOOD BY AUTOMATED COUNT: 15.2 %
HCT VFR BLD AUTO: 21.4 % (ref 36–46)
HGB BLD-MCNC: 7 G/DL (ref 12–16)
LYMPHOCYTES # BLD AUTO: 1.1 THOUSANDS/ΜL (ref 0.5–4)
LYMPHOCYTES NFR BLD AUTO: 15 % (ref 25–45)
MCH RBC QN AUTO: 30.1 PG (ref 26–34)
MCHC RBC AUTO-ENTMCNC: 32.9 G/DL (ref 31–36)
MCV RBC AUTO: 91 FL (ref 80–100)
MONOCYTES # BLD AUTO: 0.4 THOUSAND/ΜL (ref 0.2–0.9)
MONOCYTES NFR BLD AUTO: 5 % (ref 1–10)
NEUTROPHILS # BLD AUTO: 5.8 THOUSANDS/ΜL (ref 1.8–7.8)
NEUTS SEG NFR BLD AUTO: 78 % (ref 45–65)
PLATELET # BLD AUTO: 287 THOUSANDS/UL (ref 150–450)
PMV BLD AUTO: 8.1 FL (ref 8.9–12.7)
RBC # BLD AUTO: 2.34 MILLION/UL (ref 4–5.2)
WBC # BLD AUTO: 7.5 THOUSAND/UL (ref 4.5–11)

## 2020-08-31 PROCEDURE — P9016 RBC LEUKOCYTES REDUCED: HCPCS

## 2020-08-31 PROCEDURE — 36430 TRANSFUSION BLD/BLD COMPNT: CPT

## 2020-08-31 PROCEDURE — 85025 COMPLETE CBC W/AUTO DIFF WBC: CPT | Performed by: INTERNAL MEDICINE

## 2020-08-31 RX ADMIN — APIXABAN 2.5 MG: 2.5 TABLET, FILM COATED ORAL at 09:16

## 2020-08-31 RX ADMIN — APIXABAN 2.5 MG: 2.5 TABLET, FILM COATED ORAL at 17:36

## 2020-08-31 RX ADMIN — RUXOLITINIB 10 MG: 10 TABLET ORAL at 09:52

## 2020-08-31 RX ADMIN — SIMETHICONE CHEW TAB 80 MG 80 MG: 80 TABLET ORAL at 01:04

## 2020-08-31 RX ADMIN — LEVOTHYROXINE SODIUM 75 MCG: 75 TABLET ORAL at 05:00

## 2020-08-31 RX ADMIN — CITALOPRAM HYDROBROMIDE 20 MG: 20 TABLET ORAL at 09:16

## 2020-08-31 RX ADMIN — METOPROLOL TARTRATE 25 MG: 50 TABLET, FILM COATED ORAL at 21:40

## 2020-08-31 RX ADMIN — MELATONIN TAB 3 MG 3 MG: 3 TAB at 21:41

## 2020-08-31 RX ADMIN — METOPROLOL TARTRATE 25 MG: 50 TABLET, FILM COATED ORAL at 09:16

## 2020-08-31 NOTE — PLAN OF CARE
Problem: Potential for Falls  Goal: Patient will remain free of falls  Description: INTERVENTIONS:  - Assess patient frequently for physical needs  -  Identify cognitive and physical deficits and behaviors that affect risk of falls    -  [place] fall precautions as indicated by assessment   - Educate patient/family on patient safety including physical limitations  - Instruct patient to call for assistance with activity based on assessment  - Modify environment to reduce risk of injury  - Consider OT/PT consult to assist with strengthening/mobility  Outcome: Progressing

## 2020-08-31 NOTE — OCCUPATIONAL THERAPY NOTE
OCCUPATIONAL THERAPY CANCELLATION NOTE    Attempted to see patient this AM for scheduled OT treatment session  Pt stated she was tired and did not feel up to doing therapy prior to her scheduled blood transfusion this date  Will continue to follow to be seen for OT treatment as appropriate  Thuy HERRING,OTR/L

## 2020-08-31 NOTE — WOUND OSTOMY CARE
Progress Note - Wound   Luis Garvin 77 y o  female MRN: [id number]  Unit/Bed#: -01 Encounter: [id number]        Assessment:   Patient seen for wound care follow-up  Sitting up in chair with offloading air cushion, minimal assist x 1 to stand/transfer to chair  Urostomy with two piece appliance intact--patient cares for independently  Continent of bowel  Hematoma to left buttock status post fall, right buttock with scattered pink recently healed shingles areas (intact)  Bruising to bilateral arms  1   Midline abdominal incision--full thickness open area to central portion of incision  Remainder of incision approximated with staples  2   Left lower abdomin--full thickness wound from old JOSE ANTONIO site  No evidence of wound infection, induration, fluctuance, or purulent drainage at this time  See flowsheet and media for wound details  Plan:   1-Hydraguard to bilateral sacrum, buttocks, and heels BID and PRN  2-Elevate heels off of bed surface to offload pressure  3-Ehob cushion in chair when out of bed  4-Moisturize skin daily with skin nourishing cream   5-Turn/reposition q2h or when medically stable for pressure re-distribution on skin  6  Left lower abdominal wound former JOSE ANTONIO site - cleanse with normal saline, then apply maxorb, then a DSD change daily  7  Mid abdominal wound - cleanse with normal saline, then apply 1/2 inch plain packing, then top with maxorb and ABD  Change daily        Wound care team to follow        Wound 08/11/20 Abdomen Mid (Active)   Wound Image   08/31/20 1618   Wound Description Beefy red 08/31/20 1618   Fina-wound Assessment Pink;Fragile 08/31/20 1618   Wound Length (cm) 1 5 cm 08/31/20 1618   Wound Width (cm) 1 2 cm 08/31/20 1618   Wound Depth (cm) 1 8 cm 08/31/20 1618   Wound Surface Area (cm^2) 1 8 cm^2 08/31/20 1618   Wound Volume (cm^3) 3 24 cm^3 08/31/20 1618   Calculated Wound Volume (cm^3) 3 24 cm^3 08/31/20 1618   Change in Wound Size % 25 08/31/20 1618   Tunneling 2 7 cm 08/31/20 1618   Tunneling in depth located at 7 o'clock 08/31/20 1618   Undermining is depth extending from 7-11 o'clock 08/31/20 1618   Closure Unapproximated;Staples 08/31/20 1618   Drainage Amount Small 08/31/20 1618   Drainage Description Serosanguineous; Yellow 08/31/20 1618   Non-staged Wound Description Full thickness 08/31/20 1618   Treatments Cleansed;Irrigation with NSS 08/31/20 1618   Dressing Packings;Calcium Alginate;ABD 08/31/20 1618   Wound packed? Yes 08/31/20 1618   Packing- # removed 1 08/31/20 1618   Packing- # inserted 1 08/31/20 1618   Dressing Changed Changed 08/31/20 1618   Patient Tolerance Tolerated well 08/31/20 1618   Dressing Status Clean;Dry; Intact 08/31/20 1618       Wound 08/17/20 Abdomen Left; Lower (Active)   Wound Image   08/31/20 1617   Wound Description Beefy red 08/31/20 1617   Fina-wound Assessment Intact 08/31/20 1617   Wound Length (cm) 1 cm 08/31/20 1617   Wound Width (cm) 1 8 cm 08/31/20 1617   Wound Depth (cm) 0 4 cm 08/31/20 1617   Wound Surface Area (cm^2) 1 8 cm^2 08/31/20 1617   Wound Volume (cm^3) 0 72 cm^3 08/31/20 1617   Calculated Wound Volume (cm^3) 0 72 cm^3 08/31/20 1617   Change in Wound Size % 44 62 08/31/20 1617   Closure Unapproximated 08/27/20 0500   Drainage Amount Small 08/31/20 1617   Drainage Description Serosanguineous 08/31/20 1617   Non-staged Wound Description Full thickness 08/31/20 1617   Treatments Irrigation with NSS 08/31/20 1617   Dressing Calcium Alginate with Silver;Dry dressing 08/31/20 1617   Wound packed? No 08/31/20 1617   Dressing Changed Changed 08/31/20 1617   Patient Tolerance Tolerated well 08/31/20 1617   Dressing Status Clean;Dry; Intact 08/31/20 [address] BAZANN, RN, [employer]

## 2020-08-31 NOTE — SOCIAL WORK
Late note from 8/28: Pt receiving blood transfusion during care conference time  [facility] rescheduled to Wednesday at 3:00 PM with family

## 2020-09-01 LAB
ABO GROUP BLD BPU: NORMAL
ABO GROUP BLD BPU: NORMAL
BACTERIA UR QL AUTO: ABNORMAL /HPF
BILIRUB UR QL STRIP: NEGATIVE
BPU ID: NORMAL
BPU ID: NORMAL
CLARITY UR: ABNORMAL
COLOR UR: ABNORMAL
CROSSMATCH: NORMAL
CROSSMATCH: NORMAL
GLUCOSE UR STRIP-MCNC: NEGATIVE MG/DL
HGB UR QL STRIP.AUTO: 250
KETONES UR STRIP-MCNC: NEGATIVE MG/DL
LEUKOCYTE ESTERASE UR QL STRIP: 500
NITRITE UR QL STRIP: POSITIVE
NON-SQ EPI CELLS URNS QL MICRO: ABNORMAL /HPF
PH UR STRIP.AUTO: 7 [PH]
PROT UR STRIP-MCNC: ABNORMAL MG/DL
RBC #/AREA URNS AUTO: ABNORMAL /HPF
SP GR UR STRIP.AUTO: 1.01 (ref 1–1.04)
UNIT DISPENSE STATUS: NORMAL
UNIT DISPENSE STATUS: NORMAL
UNIT PRODUCT CODE: NORMAL
UNIT PRODUCT CODE: NORMAL
UNIT RH: NORMAL
UNIT RH: NORMAL
UROBILINOGEN UA: NEGATIVE MG/DL
WBC #/AREA URNS AUTO: ABNORMAL /HPF

## 2020-09-01 PROCEDURE — 81001 URINALYSIS AUTO W/SCOPE: CPT | Performed by: INTERNAL MEDICINE

## 2020-09-01 PROCEDURE — 97110 THERAPEUTIC EXERCISES: CPT

## 2020-09-01 PROCEDURE — 97530 THERAPEUTIC ACTIVITIES: CPT

## 2020-09-01 PROCEDURE — 97116 GAIT TRAINING THERAPY: CPT

## 2020-09-01 PROCEDURE — 97535 SELF CARE MNGMENT TRAINING: CPT

## 2020-09-01 RX ADMIN — SIMETHICONE CHEW TAB 80 MG 80 MG: 80 TABLET ORAL at 21:02

## 2020-09-01 RX ADMIN — CITALOPRAM HYDROBROMIDE 20 MG: 20 TABLET ORAL at 09:21

## 2020-09-01 RX ADMIN — METOPROLOL TARTRATE 25 MG: 50 TABLET, FILM COATED ORAL at 09:21

## 2020-09-01 RX ADMIN — METOPROLOL TARTRATE 25 MG: 50 TABLET, FILM COATED ORAL at 21:01

## 2020-09-01 RX ADMIN — MELATONIN TAB 3 MG 3 MG: 3 TAB at 21:01

## 2020-09-01 RX ADMIN — APIXABAN 2.5 MG: 2.5 TABLET, FILM COATED ORAL at 09:21

## 2020-09-01 RX ADMIN — APIXABAN 2.5 MG: 2.5 TABLET, FILM COATED ORAL at 16:28

## 2020-09-01 RX ADMIN — LEVOTHYROXINE SODIUM 75 MCG: 75 TABLET ORAL at 05:19

## 2020-09-01 RX ADMIN — RUXOLITINIB 10 MG: 10 TABLET ORAL at 09:21

## 2020-09-01 NOTE — PLAN OF CARE
Problem: PHYSICAL THERAPY ADULT  Goal: Performs mobility at highest level of function for planned discharge setting  See evaluation for individualized goals  Description: Treatment/Interventions: Functional transfer training, LE strengthening/ROM, Elevations, Therapeutic exercise, Endurance training, Patient/family training, Equipment eval/education, Bed mobility, Gait training, Spoke to nursing, Spoke to case management  Equipment Recommended: Walker(progress to LRAD)       See flowsheet documentation for full assessment, interventions and recommendations  9/1/2020 0918 by Salvador Orozco PTA  Outcome: Progressing  Note: Prognosis: Good  Problem List: Decreased strength, Decreased endurance, Decreased mobility  Assessment: Pt reports feeling better with increase energy level after receiving blood and increase positive mood as well  Pt trialed steps down forward and backwards- did better with backwards  Pt has 6 MARLON with Lrail only and 12 with full L rail and half R rail then wall sats 96 HR 77after activity  Barriers to Discharge: Inaccessible home environment, Decreased caregiver support     PT Discharge Recommendation: (as per primary PT recomm  )     PT - OK to Discharge: No    See flowsheet documentation for full assessment  9/1/2020 0918 by Salvador Orozco PTA  Outcome: Progressing  Note: Prognosis: Good  Problem List: Decreased strength, Decreased endurance, Decreased mobility  Assessment: Pt reports feeling better with increase energy level after receiving blood and increase positive mood as well  Pt trialed steps down forward and backwards- did better with backwards  Pt has 6 MARLON with Lrail only and 12 with full L rail and half R rail then wall sats 96 HR 77after activity  Barriers to Discharge: Inaccessible home environment, Decreased caregiver support     PT Discharge Recommendation: (as per primary PT recomm  )     PT - OK to Discharge: No    See flowsheet documentation for full assessment

## 2020-09-01 NOTE — PHYSICAL THERAPY NOTE
65' session     09/01/20 0903   Pain Assessment   Pain Assessment Tool Pain Assessment not indicated - pt denies pain   Restrictions/Precautions   Other Precautions Fall Risk   General   Chart Reviewed Yes   Additional Pertinent History pt received 2 units of blood yesterday    Family/Caregiver Present No   Cognition   Overall Cognitive Status WFL   Arousal/Participation Alert; Cooperative   Attention Within functional limits   Memory Within functional limits   Following Commands Follows all commands and directions without difficulty   Subjective   Subjective my mind set is so much better today ( felt very depressed last few days)   Transfers   Sit to Stand 6  Modified independent   Stand to Sit 6  Modified independent   Stand pivot 5  Supervision   Toilet transfer 5  Supervision   Ambulation/Elevation   Gait pattern   (slow pace)   Gait Assistance 5  Supervision   Assistive Device Rolling walker   Distance 609',150'   Stair Management Assistance   (CG)   Stair Management Technique   (2 hands L rail, down backwards then forward/sideways)   Number of Stairs 12   Curbs 1 + 1 with RW adn A for walker mangement and CG   Balance   Static Standing Fair +   Dynamic Standing Fair   Ambulatory Fair   Endurance Deficit   Endurance Deficit Yes   Activity Tolerance   Activity Tolerance Patient tolerated treatment well   Exercises   Hip Flexion Bilateral;AROM;20 reps   Knee AROM Long Arc Quad Bilateral;AROM;20 reps   Ankle Pumps Standing  (heel raises x 20 with RW)   Assessment   Prognosis Good   Problem List Decreased strength;Decreased endurance;Decreased mobility   Assessment Pt reports feeling better with increase energy level after receiving blood and increase positive mood as well  Pt trialed steps down forward and backwards- did better with backwards   Pt has 6 MARLON with Lrail only and 12 with full L rail and half R rail then wall sats 96 HR 77after activity   Barriers to Discharge Inaccessible home environment;Decreased caregiver support   Goals   Patient Goals get up and moving   STG Expiration Date 09/08/20   PT Treatment Day 6   Plan   Treatment/Interventions   (cont as per POC)   Progress Progressing toward goals   PT Frequency   (5-7x/wk)     Goals STG achieved within 2 weeks Performance at Initial Evaluation (8/25/2020) Last date completed      Bed mobility skills including rolling and repositioning to prevent skin breakdown and decrease caregiver burden            modified independent assistance               8/27            Supine to sit transitions to increase ease of transfer, allow pt to get out of bed, and decrease caregiver burden          modified independent assistance               8/27   pt uses R side of bed at home      Sit to supine transitions to increase ease of transfer, allow pt to get back into bed, and decrease caregiver burden          modified independent assistance               8/29      Functional transfers to facilitate safe return to previous living environment        modified independent assistance    supervision assistance x1    9/1      Ambulation with least restrictive AD; including at least 2 turns for safe household distance ambulation          modified independent assistance       supervision assistance x1       9/1      Ascend/descend a full flight of steps with left handrail, with device prn, for safe access to previous living environment          modified independent assistance       Not attempted 2* fatigue          9/1      Ascend/descend a curb step, using appropriate AD and method, no handrails, for safe access to previous living environment          modified independent assistance       Not attempted 2* fatigue          9/1      Improve standing functional balance to allow for pt to  object from floor            modified independent assistance       Not attempted 2* fatigue          8/29

## 2020-09-01 NOTE — OCCUPATIONAL THERAPY NOTE
Occupational Therapy Treatment Note    Gracie Covarrubiast    Patient Active Problem List   Diagnosis    Chronic saddle pulmonary embolism (HCC)    Stage 4 chronic kidney disease (HCC)    Bladder mass    Small bowel obstruction (HCC)    Obstructive uropathy    Secondary hyperparathyroidism of renal origin ([facility] [facility]    Hypothyroidism    Hypertension    Polycythemia vera ([facility] [facility]    Fall    Subdural hematoma, acute (HCC)    Intraventricular hemorrhage (HCC)    Diarrhea    Recurrent Clostridium difficile diarrhea    Anemia in CKD (chronic kidney disease)    Mass of urethra    Stage 5 chronic kidney disease not on chronic dialysis ([facility] [facility] )    Thoracic compression fracture (HCC)    Hematuria    Abnormal finding on MRI of brain    Benign neoplasm of supratentorial region of brain ([facility] [facility] )    Meningioma ([facility]    UTI (urinary tract infection)    Herpes zoster    Inverted T wave    Polycythemia    Encounter for surgical aftercare following surgery on the digestive system    History of Clostridioides difficile colitis    History of shingles    Depression       Past Medical History:   Diagnosis Date    Anxiety     Chronic kidney disease (CKD), stage IV (severe) (HCC)     stage IV    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    Compression fracture of cervical spine (HCC)     Hydronephrosis     Hypertension     Hypothyroid     Incontinence     Lung mass     Improving on PET/CT 1/2016    Polycythemia vera (HCC)     Pulmonary embolism ([facility] [address] ) 2014    Urinary tract infection      TIME: 949-1034 45 minutes  VITALS: stable throughout  PAIN: none reported  COGNITION: WFL  PRECAUTIONS: Fall risk, ostomy     EXPIRATION DATE: 9/8/2020  TREATMENT DAY: 5  DISCHARGE RECOMMENDATION: Home with social support  DME NEEDED FOR DISCHARGE: Walker basket if needed  ADDITIONAL COMMENTS:     SESSION DETAILS / ASSESSMENT:  Treatment focus on UB ADLs, grooming in stance, functional transfers and mobility, shower stall transfers, UE therapeutic exercise and kitchen mobility with meal prep simulation including item retrieval  Demonstrated improvements with standing balance and tolerance, ADL transfers including shower transfers, activity tolerance and overall safety with tasks presented  Patient completed walk-in shower transfer with L GB with MI this date and good safety  Pt completed kitchen mobility with item retrieval of items OH and below the waist with MI with use of walker basket  Discussed benefit of walker basket; will assess need closer to discharge  Patient educated on the importance of pacing with activities including taking seated rest breaks during kitchen tasks and sitting in the shower on shower chair as needed, safe transfer techniques with controlled descent and fall prevention with transfers and functional mobility  Pt limited by endurance, functional mobility and overall strength  Pt would benefit from continued skilled OT services to address the following deficits: ADLs, functional mobility, static and dynamic standing balance and tolerance, kitchen mobility and meal prep with use of RW during functional ambulation and overall functional strength and endurance needed to carryout BADL tasks in order for patient to safely discharge to the next level of care  Proceed with POC  Patient left seated in recliner at end of tx session with legs elevated, all needs met and call bell within reach       Goals STG achieved within 2 wks Performance at Initial Evaluation (08/25) Current Performance (last date completed)   Grooming while standing at sink Lana/I  8/27, 9/1 Supervision  9/1 MI  8/26 Supervision     ADL transfers  Lana/I  9/1 CGA 9/1 MI  8/29 STS MI from recliner, SUP STS from toilet  8/27 STS MI and DS SPT-- good safety + RW   8/26 STS and SPT supervision    Bathroom mobility  Lana/I CGA with RW 8/29 DS with RW   UB ADL  Lana/I  9/1 Nathan  9/1 MI  8/26 Bathing MI; dressing MI    LB ADL, AE PRN Lana/I modA  8/26 min assist with AE ed except socks max assist    Toileting/clothing management and hygiene Lana/I modA 8/29 Mod A for posterior thoroughness   8/26 hygiene supervision; CM supervision (min for thoroughness when fatigued)   Dynamic standing balance Fair +   8/29, 9/1 Fair - 9/1 F+   8/27 Fair/Fair+    Increase standing tolerance for inc'd safety with standing purposeful tasks > 10 minutes  9/1 ~ 4 minutes 9/1 10 min with kitchen tasks and mobility  8/29 7 min with toileting and mob   8/27 5 min + SOB    Participate in therex 1-3x/week for inc'd overall stamina/activity tolerance for purposeful tasks To complete  8/27, 9/1 9/1 3# and 2# dowel shoulder planes 2x10  8/27 2# weighted ball in all planes with good tolerance for 2x10    Shower stall transfer with grab bar Lana/I  9/1 9/1 MI +L GB  8/27 Supervision     Kitchen mobility for inc'd independence and safety negotiating kitchen environment Lana/I  9/1 9/1 MI with RW  8/27 simulated in room DS +RW     Light meal prep Lana/I   9/1 SUP with RW  8/27 simulated in room DS + walker tray    Bed mobility with HOB flat  Lana/I    8/27 Supervision      Em Palencia MOT,OTR/L

## 2020-09-02 LAB
EST. AVERAGE GLUCOSE BLD GHB EST-MCNC: 111 MG/DL
HBA1C MFR BLD: 5.5 %

## 2020-09-02 PROCEDURE — 97530 THERAPEUTIC ACTIVITIES: CPT

## 2020-09-02 PROCEDURE — 97116 GAIT TRAINING THERAPY: CPT

## 2020-09-02 PROCEDURE — 83036 HEMOGLOBIN GLYCOSYLATED A1C: CPT | Performed by: INTERNAL MEDICINE

## 2020-09-02 RX ORDER — CEPHALEXIN 500 MG/1
500 CAPSULE ORAL EVERY 12 HOURS SCHEDULED
Status: COMPLETED | OUTPATIENT
Start: 2020-09-02 | End: 2020-09-07

## 2020-09-02 RX ADMIN — APIXABAN 2.5 MG: 2.5 TABLET, FILM COATED ORAL at 09:07

## 2020-09-02 RX ADMIN — RUXOLITINIB 10 MG: 10 TABLET ORAL at 09:07

## 2020-09-02 RX ADMIN — CITALOPRAM HYDROBROMIDE 20 MG: 20 TABLET ORAL at 09:07

## 2020-09-02 RX ADMIN — MELATONIN TAB 3 MG 3 MG: 3 TAB at 21:09

## 2020-09-02 RX ADMIN — CEPHALEXIN 500 MG: 500 CAPSULE ORAL at 21:09

## 2020-09-02 RX ADMIN — APIXABAN 2.5 MG: 2.5 TABLET, FILM COATED ORAL at 17:00

## 2020-09-02 RX ADMIN — METOPROLOL TARTRATE 25 MG: 50 TABLET, FILM COATED ORAL at 21:09

## 2020-09-02 RX ADMIN — METOPROLOL TARTRATE 25 MG: 50 TABLET, FILM COATED ORAL at 09:07

## 2020-09-02 RX ADMIN — LEVOTHYROXINE SODIUM 75 MCG: 75 TABLET ORAL at 05:57

## 2020-09-02 NOTE — OCCUPATIONAL THERAPY NOTE
Occupational Therapy Treatment Note    Abilio Faria    Patient Active Problem List   Diagnosis    Chronic saddle pulmonary embolism (HCC)    Stage 4 chronic kidney disease (HCC)    Bladder mass    Small bowel obstruction (HCC)    Obstructive uropathy    Secondary hyperparathyroidism of renal origin ([facility] [address] [facility]    Hypothyroidism    Hypertension    Polycythemia vera ([facility] [address] [facility]    Fall    Subdural hematoma, acute (HCC)    Intraventricular hemorrhage (HCC)    Diarrhea    Recurrent Clostridium difficile diarrhea    Anemia in CKD (chronic kidney disease)    Mass of urethra    Stage 5 chronic kidney disease not on chronic dialysis ([facility] [address] )    Thoracic compression fracture (HCC)    Hematuria    Abnormal finding on MRI of brain    Benign neoplasm of supratentorial region of brain ([facility] [address] [facility]    Meningioma ([facility] [address] [facility]    UTI (urinary tract infection)    Herpes zoster    Inverted T wave    Polycythemia    Encounter for surgical aftercare following surgery on the digestive system    History of Clostridioides difficile colitis    History of shingles    Depression       Past Medical History:   Diagnosis Date    Anxiety     Chronic kidney disease (CKD), stage IV (severe) (HCC)     stage IV    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    Compression fracture of cervical spine (HCC)     Hydronephrosis     Hypertension     Hypothyroid     Incontinence     Lung mass     Improving on PET/CT 1/2016    Polycythemia vera (HCC)     Pulmonary embolism ([facility] [address] ) 2014    Urinary tract infection      TIME: 1180-1579 40 min   VITALS: stable throughout  PAIN: none reported  COGNITION: WFL  PRECAUTIONS: Fall risk, ostomy     EXPIRATION DATE: 9/8/2020  TREATMENT DAY: 6  DISCHARGE RECOMMENDATION: Home with social support  DME NEEDED FOR DISCHARGE: Walker basket if needed  ADDITIONAL COMMENTS:     SESSION DETAILS / ASSESSMENT:    Pt pleasant and cooperative during session   Focused on progressing safety with in-home navigation, negotiating environmental barriers with good safety  Able to manage bathroom door with no concerns, appropriate for independent sign to be issued tomorrow  Improved ambulatory distance ~150 ft with minimal SOB (o2 RA 97%)  Pt, son via speaker phone, OT, SW, DON present  Discussed current progress in therapy and current status  Discussed current POC, remains appropriate at this time  Anticipated d/c early next week (9/8/2002)  Pt limited by ROM, weakness, endurance and static/dynamic standing balance  Pt would benefit from continued skilled OT/PT to achieve POC goals and ensure safe d/c   Recommending Home OT with family support at time of d/c            Goals STG achieved within 2 wks Performance at Initial Evaluation (08/25) Current Performance (last date completed)   Grooming while standing at sink Lana/I  8/27, 9/1 Supervision  9/1 MI  8/26 Supervision     ADL transfers  Lana/I  9/1 CGA 9/2 MI  8/29 STS MI from recliner, SUP STS from toilet  8/27 STS MI and DS SPT-- good safety + RW   8/26 STS and SPT supervision    Bathroom mobility  Lana/I CGA with RW 9/2 MI   UB ADL  Lana/I  9/1 Nathan  9/1 MI  8/26 Bathing MI; dressing MI    LB ADL, AE PRN Lana/I modA  8/26 min assist with AE ed except socks max assist    Toileting/clothing management and hygiene Lana/I modA 8/29 Mod A for posterior thoroughness   8/26 hygiene supervision; CM supervision (min for thoroughness when fatigued)   Dynamic standing balance Fair +   8/29, 9/1 Fair - 9/2 F+   8/27 Fair/Fair+    Increase standing tolerance for inc'd safety with standing purposeful tasks > 10 minutes  9/1 ~ 4 minutes 9/2 4-5 min   9/1 10 min with kitchen tasks and mobility  8/29 7 min with toileting and mob   8/27 5 min + SOB    Participate in therex 1-3x/week for inc'd overall stamina/activity tolerance for purposeful tasks To complete  8/27, 9/1 9/1 3# and 2# dowel shoulder planes 2x10  8/27 2# weighted ball in all planes with good tolerance for 2x10  Shower stall transfer with grab bar Lana/I  9/1 9/1 MI +L GB  8/27 Supervision     Kitchen mobility for inc'd independence and safety negotiating kitchen environment Lana/I  9/1 9/1 MI with RW  8/27 simulated in room DS +RW     Light meal prep Lana/I   9/1 SUP with RW  8/27 simulated in room DS + walker tray    Bed mobility with HOB flat  Lana/I    8/27 Supervision       Rubin Morley, MS, OTR/L

## 2020-09-02 NOTE — UTILIZATION REVIEW
DATE: 9/2/20        AUTH#  [id number]      MEDICAL:    Vu Fam MD    Physician    Hospitalist    Progress Notes    Signed    Date of Service:  8/28/2020  2:51 PM                Signed                    Progress Note - Yesica Sands [birth year], 77 y o  female MRN: [id number]     Unit/Bed#: -01 Encounter: [id number]     Primary Care Provider: Orlando Burgos MD   Date and time admitted to hospital: 8/24/2020  7:13 PM           * Encounter for surgical aftercare following surgery on the digestive system  Assessment & Plan  Patient had prolonged hospitalization for 2 weeks  She was admitted and [facility] from August 10 through August 24th for incarcerated peristomal hernia at the ileal conduct side  She is status post ex laparotomy and kirt stomal hernia repair with mesh and lysis of adhesions  He had postoperative difficulty with bowel function requiring bowel rest and NG tube placement which is resolved  IR placed IJ line due to difficulty accessing peripheral IV  Following the surgery patient was found to be severely deconditioned with inability to resume activity of daily living due to weakness and decreased endurance  Therefore recommended to be admitted at rehab for further strengthening      Progressing with physical therapy and occupational therapy          Polycythemia vera ([address] )  Assessment & Plan  · Follow-up with heme oncology  · Has been on Jakafi 10 mg daily will continue -patient brought from home     Hypertension  Assessment & Plan  Continue metoprolol tartrate  Was on nifedipine will resume if blood pressure above 130/80 given CKD and recent LUANN      Hypothyroidism  Assessment & Plan  Continue levothyroxine 75 mcg daily     Obstructive uropathy  Assessment & Plan  · This is a chronic problem    She follow-up with urology 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 elevated bladder pressure leading to hydronephrosis and CKD  · Last seen urology July 2019 was scheduled for yearly follow-up      Stage 4 chronic kidney disease ([facility] )  Assessment & Plan  · Creatinine baseline 2-3 currently within baseline  · She has functional solitary right kidney with chronic 8 mm stone in the lower pole  · Outpatient follow-up with Nephrology September 24 20   · Intermittent IV fluids for worsening renal function  · Aggressively treat any evidence urinary tract infection given solitary right function kidney      Chronic saddle pulmonary embolism (HCC)  Assessment & Plan  · Continue home dose Eliquis 2 5 mg b i d  Unclear why reduce does given her age and weight does not meet criteria  · She does have history of subdural hematoma in the past  · Patient follow-up with heme oncology given history of polycythemia vera         History of shingles  Assessment & Plan  · About 2 weeks ago she had dermatomal skin lesion consistent with shingles on the right gluteus region lesions are crusted no longer need isolation  There is no post herpetic neuralgia      History of Clostridioides difficile colitis  Assessment & Plan  · Last infection 2019  · Treated with vancomycin prophylaxis while on Keflex in recent hospitalization  · Completed prophylaxis vancomycin 48 hours following cessation of antibiotic     Anemia in CKD (chronic kidney disease)  Assessment & Plan  · Hemoglobin baseline 9-10 most recent hemoglobin prior to hospital discharge 7 4  · Hemoglobin 6 6 symptomatic with lightheadedness  · Iron panel borderline for iron deficiency  · Type and screen  · Transfused 2 units of packed red blood cells 1 unit today at the infusion center and 1 unit on 08/31/2020 discussed with infusion center per scheduling    · Check fecal occult blood     Depression  Assessment & Plan  Continue citalopram 20 mg daily          VTE Pharmacologic Prophylaxis:   Pharmacologic: Apixaban (Eliquis)  Mechanical VTE Prophylaxis in Place: No     Patient Centered Rounds: I have performed bedside rounds with nursing staff today      Discussions with Specialists or Other Care Team Provider:  Discussed with nursing staff      Education and Discussions with Family / Patient:  Discussed with son Judy Morales over the phone       Time Spent for Care: 20 minutes  More than 50% of total time spent on counseling and coordination of care as described above      Current Length of Stay: 4 day(s)     Current Patient Status: SNF Short Term Inpatient   Certification Statement: The patient will continue to require additional inpatient hospital stay due to Ongoing physical and occupational therapy given deconditioning     Discharge Plan:  Anticipate discharge next week home      Code Status: Level 1 - Full Code        Subjective:   Patient seen and examined  She feel lightheaded  Otherwise denied shortness of breath      Objective:      Vitals:   Temp (24hrs), Av 7 °F (36 5 °C), Min:97 3 °F (36 3 °C), Max:98 °F (36 7 °C)    Temp:  [97 3 °F (36 3 °C)-98 °F (36 7 °C)] 98 °F (36 7 °C)  HR:  [70-96] 70  Resp:  [16] 16  BP: ()/(54-97) 100/60  SpO2:  [96 %-98 %] 98 %  Body mass index is 37 59 kg/m²       Input and Output Summary (last 24 hours):        Intake/Output Summary (Last 24 hours) at 2020 1451  Last data filed at 2020 0934      Gross per 24 hour   Intake 600 ml   Output 1075 ml   Net -475 ml        Physical Exam:      Physical Exam  Constitutional:       Appearance: Normal appearance  She is obese  She is ill-appearing  She is not toxic-appearing or diaphoretic  HENT:      Head: Normocephalic and atraumatic  Nose: No rhinorrhea  Mouth/Throat:      Mouth: Mucous membranes are moist       Pharynx: No oropharyngeal exudate or posterior oropharyngeal erythema  Eyes:      General:         Right eye: No discharge  Left eye: No discharge  Cardiovascular:      Rate and Rhythm: Normal rate and regular rhythm  Heart sounds: Normal heart sounds  No murmur  Pulmonary:      Effort: Pulmonary effort is normal  No respiratory distress  Breath sounds: Normal breath sounds  No stridor  No wheezing or rales  Abdominal:      General: Bowel sounds are normal       Palpations: Abdomen is soft  Tenderness: There is abdominal tenderness (Mild tenderness around surgical site)  Comments: Vertical surgical incision with staples  Mild serosanguineous fluid elicited by deep palpation  Right lower quadrant urinary diversion ostomy back with yellowish liquid   Musculoskeletal:      Right lower leg: No edema  Left lower leg: No edema  Skin:     General: Skin is dry  Neurological:      Mental Status: She is alert and oriented to person, place, and time  Psychiatric:         Behavior: Behavior normal       Comments: Flat affect               Additional Data:      Labs:           Results from last 7 days   Lab Units 08/28/20  1030 08/23/20  0513   WBC Thousand/uL 12 20* 8 25   HEMOGLOBIN g/dL 6 6* 7 4*   HEMATOCRIT % 20 1* 24 1*   PLATELETS Thousands/uL 411 202   BANDS PCT % 6 1   LYMPHO PCT % 9* 5*   MONO PCT % 1 6   EOS PCT %  --  1          Results from last 7 days   Lab Units 08/24/20  0506   SODIUM mmol/L 137   POTASSIUM mmol/L 4 1   CHLORIDE mmol/L 107   CO2 mmol/L 22   BUN mg/dL 38*   CREATININE mg/dL 3 10*   ANION GAP mmol/L 8   CALCIUM mg/dL 8 1*   GLUCOSE RANDOM mg/dL 91                Results from last 7 days   Lab Units 08/27/20  1431 08/25/20  1650 08/25/20  1148   POC GLUCOSE mg/dl 140 99 93                      * I Have Reviewed All Lab Data Listed Above  * Additional Pertinent Lab Tests Reviewed: All Labs Within Last 24 Hours Reviewed     Imaging:     Imaging Reports Reviewed Today Include:  No imaging    Imaging Personally Reviewed by Myself Includes:  No imaging      Recent Cultures (last 7 days):             Last 24 Hours Medication List:            Current Facility-Administered Medications   Medication Dose Route Frequency Provider Last Rate    apixaban  2 5 mg Oral BID Rupali S Elbert, CRNP      citalopram  20 mg Oral Daily Rupali S Elbert, CRNP      levothyroxine  75 mcg Oral Early Morning Rupali S Elbert, CRNP      melatonin  3 mg Oral HS Rupali S Elbert, CRNP      metoprolol tartrate  25 mg Oral Q12H [facility] Rupali S Elbert, CRNP      oxyCODONE  2 5 mg Oral Q4H PRN Rupali S Elbert, CRNP      oxyCODONE  5 mg Oral Q4H PRN Rupali S Elbert, CRNP      ruxolitinib  10 mg Oral Daily Rupali S Elbert, CRNP      simethicone  80 mg Oral Q6H PRN Rupali S Elbert, CRNP          Today, Patient Was Seen By: Berta Ochoa MD     ** Please Note: Dictation voice to text software may have been used in the creation of this document  **                                    Raven Rosenthal RN    Registered Nurse    Specialty:  Wound Care    Wound Ostomy Care    Signed    Date of Service:  8/31/2020  4:15 PM                Signed         Summary:  Wound Care Nurse Follow-up         Progress Note - Wound   Jann Garcia 77 y o  female MRN: [id number]  Unit/Bed#: -01 Encounter: [id number]           Assessment:   Patient seen for wound care follow-up  Sitting up in chair with offloading air cushion, minimal assist x 1 to stand/transfer to chair  Urostomy with two piece appliance intact--patient cares for independently  Continent of bowel  Hematoma to left buttock status post fall, right buttock with scattered pink recently healed shingles areas (intact)  Bruising to bilateral arms  1   Midline abdominal incision--full thickness open area to central portion of incision  Remainder of incision approximated with staples  2   Left lower abdomin--full thickness wound from old JOSE ANTONIO site        No evidence of wound infection, induration, fluctuance, or purulent drainage at this time        See flowsheet and media for wound details      Plan:   1-Hydraguard to bilateral sacrum, buttocks, and heels BID and PRN    2-Elevate heels off of bed surface to offload pressure  3-Ehob cushion in chair when out of bed  4-Moisturize skin daily with skin nourishing cream   5-Turn/reposition q2h or when medically stable for pressure re-distribution on skin    6  Left lower abdominal wound former JOSE ANTONIO site - cleanse with normal saline, then apply maxorb, then a DSD change daily  7  Mid abdominal wound - cleanse with normal saline, then apply 1/2 inch plain packing, then top with maxorb and ABD  Change daily         Wound care team to follow             Wound 08/11/20 Abdomen Mid (Active)   Wound Image   08/31/20 1618   Wound Description Beefy red 08/31/20 1618   Fina-wound Assessment Pink;Fragile 08/31/20 1618   Wound Length (cm) 1 5 cm 08/31/20 1618   Wound Width (cm) 1 2 cm 08/31/20 1618   Wound Depth (cm) 1 8 cm 08/31/20 1618   Wound Surface Area (cm^2) 1 8 cm^2 08/31/20 1618   Wound Volume (cm^3) 3 24 cm^3 08/31/20 1618   Calculated Wound Volume (cm^3) 3 24 cm^3 08/31/20 1618   Change in Wound Size % 25 08/31/20 1618   Tunneling 2 7 cm 08/31/20 1618   Tunneling in depth located at 7 o'clock 08/31/20 1618   Undermining is depth extending from 7-11 o'clock 08/31/20 1618   Closure Unapproximated;Staples 08/31/20 1618   Drainage Amount Small 08/31/20 1618   Drainage Description Serosanguineous; Yellow 08/31/20 1618   Non-staged Wound Description Full thickness 08/31/20 1618   Treatments Cleansed;Irrigation with NSS 08/31/20 1618   Dressing Packings;Calcium Alginate;ABD 08/31/20 1618   Wound packed? Yes 08/31/20 1618   Packing- # removed 1 08/31/20 1618   Packing- # inserted 1 08/31/20 1618   Dressing Changed Changed 08/31/20 1618   Patient Tolerance Tolerated well 08/31/20 1618   Dressing Status Clean;Dry; Intact 08/31/20 1618       Wound 08/17/20 Abdomen Left; Lower (Active)   Wound Image   08/31/20 1617   Wound Description Beefy red 08/31/20 1617   Fina-wound Assessment Intact 08/31/20 1617   Wound Length (cm) 1 cm 08/31/20 1617   Wound Width (cm) 1 8 cm 08/31/20 1617   Wound Depth (cm) 0 4 cm 08/31/20 1617   Wound Surface Area (cm^2) 1 8 cm^2 08/31/20 1617   Wound Volume (cm^3) 0 72 cm^3 08/31/20 1617   Calculated Wound Volume (cm^3) 0 72 cm^3 08/31/20 1617   Change in Wound Size % 44 62 08/31/20 1617   Closure Unapproximated 08/27/20 0500   Drainage Amount Small 08/31/20 1617   Drainage Description Serosanguineous 08/31/20 1617   Non-staged Wound Description Full thickness 08/31/20 1617   Treatments Irrigation with NSS 08/31/20 1617   Dressing Calcium Alginate with Silver;Dry dressing 08/31/20 1617   Wound packed? No 08/31/20 1617   Dressing Changed Changed 08/31/20 1617   Patient Tolerance Tolerated well 08/31/20 1617   Dressing Status Clean;Dry; Intact 08/31/20 [address] [facility] Georgiana PAGE, RN, [employer]                                  PT NOTES:      Marnette Goldberg, PTA    Physical Therapist Assistant    Specialty:  Physical Therapy    Physical Therapy Note    Signed    Date of Service:  9/1/2020  9:17 AM                Signed                           64' session      09/01/20 0903   Pain Assessment   Pain Assessment Tool Pain Assessment not indicated - pt denies pain   Restrictions/Precautions   Other Precautions Fall Risk   General   Chart Reviewed Yes   Additional Pertinent History pt received 2 units of blood yesterday    Family/Caregiver Present No   Cognition   Overall Cognitive Status WFL   Arousal/Participation Alert; Cooperative   Attention Within functional limits   Memory Within functional limits   Following Commands Follows all commands and directions without difficulty   Subjective   Subjective my mind set is so much better today ( felt very depressed last few days)   Transfers   Sit to Stand 6  Modified independent   Stand to Sit 6  Modified independent   Stand pivot 5  Supervision   Toilet transfer 5  Supervision   Ambulation/Elevation   Gait pattern    (slow pace)   Gait Assistance 5  Supervision   Assistive Device Rolling walker   Distance 913',305'   Stair Management Assistance    (CG)   Stair Management Technique    (2 hands L rail, down backwards then forward/sideways)   Number of Stairs 12   Curbs 1 + 1 with RW adn A for walker mangement and CG   Balance   Static Standing Fair +   Dynamic Standing Fair   Ambulatory Fair   Endurance Deficit   Endurance Deficit Yes   Activity Tolerance   Activity Tolerance Patient tolerated treatment well   Exercises   Hip Flexion Bilateral;AROM;20 reps   Knee AROM Long Arc Quad Bilateral;AROM;20 reps   Ankle Pumps Standing  (heel raises x 20 with RW)   Assessment   Prognosis Good   Problem List Decreased strength;Decreased endurance;Decreased mobility   Assessment Pt reports feeling better with increase energy level after receiving blood and increase positive mood as well  Pt trialed steps down forward and backwards- did better with backwards   Pt has 6 MARLON with Lrail only and 12 with full L rail and half R rail then wall sats 96 HR 77after activity   Barriers to Discharge Inaccessible home environment;Decreased caregiver support   Goals   Patient Goals get up and moving   STG Expiration Date 09/08/20   PT Treatment Day 6   Plan   Treatment/Interventions    (cont as per POC)   Progress Progressing toward goals   PT Frequency    (5-7x/wk)     Goals STG achieved within 2 weeks Performance at Initial Evaluation (8/25/2020) Last date completed      Bed mobility skills including rolling and repositioning to prevent skin breakdown and decrease caregiver burden            modified independent assistance               8/27            Supine to sit transitions to increase ease of transfer, allow pt to get out of bed, and decrease caregiver burden          modified independent assistance               8/27   pt uses R side of bed at home      Sit to supine transitions to increase ease of transfer, allow pt to get back into bed, and decrease caregiver burden          modified independent assistance               8/29      Functional transfers to facilitate safe return to previous living environment        modified independent assistance    supervision assistance x1    9/1      Ambulation with least restrictive AD; including at least 2 turns for safe household distance ambulation          modified independent assistance       supervision assistance x1       9/1      Ascend/descend a full flight of steps with left handrail, with device prn, for safe access to previous living environment          modified independent assistance       Not attempted 2* fatigue          9/1      Ascend/descend a curb step, using appropriate AD and method, no handrails, for safe access to previous living environment          modified independent assistance       Not attempted 2* fatigue          9/1      Improve standing functional balance to allow for pt to  object from floor            modified independent assistance       Not attempted 2* fatigue          8/29                                               OT NOTES:      Idania Greene OT    Occupational Therapist    Specialty:  Occupational Therapy    Occupational Therapy Note    Signed    Date of Service:  9/1/2020  9:49 AM                Signed         Expand All Collapse All      Occupational Therapy Treatment Note     Elham Oconnory         Patient Active Problem List   Diagnosis    Chronic saddle pulmonary embolism ([address] )    Stage 4 chronic kidney disease ([address] )    Bladder mass    Small bowel obstruction ([address] )    Obstructive uropathy    Secondary hyperparathyroidism of renal origin ([address] )    Hypothyroidism    Hypertension    Polycythemia vera ([address] )    Fall    Subdural hematoma, acute (HCC)    Intraventricular hemorrhage ([address] )    Diarrhea    Recurrent Clostridium difficile diarrhea    Anemia in CKD (chronic kidney disease)    Mass of urethra    Stage 5 chronic kidney disease not on chronic dialysis ([address] )    Thoracic compression fracture ([address] )    Hematuria    Abnormal finding on MRI of brain    Benign neoplasm of supratentorial region of brain ([facility] [address] [address] [facility]    M[facility]    UTI (urinary tract infection)    Herpes zoster    Inverted T wave    Polycythemia    Encounter for surgical aftercare following surgery on the digestive system    History of Clostridioides difficile colitis    History of shingles    Depression        Medical History        Past Medical History:   Diagnosis Date    Anxiety      Chronic kidney disease (CKD), stage IV (severe) (HCC)       stage IV    Chronic thrombosis of subclavian vein (HCC)       right    Compression fracture of cervical spine (HCC)      Hydronephrosis      Hypertension      Hypothyroid      Incontinence      Lung mass       Improving on PET/CT 1/2016    Polycythemia vera ([facility] )      Pulmonary embolism ([facility] ) 2014    Urinary tract infection          TIME: 949-1034 45 minutes  VITALS: stable throughout  PAIN: none reported  COGNITION: WFL  PRECAUTIONS: Fall risk, ostomy     EXPIRATION DATE: 9/8/2020  TREATMENT DAY: 5  DISCHARGE RECOMMENDATION: Home with social support  DME NEEDED FOR DISCHARGE: Walker basket if needed  ADDITIONAL COMMENTS:      SESSION DETAILS / ASSESSMENT:  Treatment focus on UB ADLs, grooming in stance, functional transfers and mobility, shower stall transfers, UE therapeutic exercise and kitchen mobility with meal prep simulation including item retrieval  Demonstrated improvements with standing balance and tolerance, ADL transfers including shower transfers, activity tolerance and overall safety with tasks presented  Patient completed walk-in shower transfer with L GB with MI this date and good safety  Pt completed kitchen mobility with item retrieval of items OH and below the waist with MI with use of walker basket  Discussed benefit of walker basket; will assess need closer to discharge   Patient educated on the importance of pacing with activities including taking seated rest breaks during kitchen tasks and sitting in the shower on shower chair as needed, safe transfer techniques with controlled descent and fall prevention with transfers and functional mobility  Pt limited by endurance, functional mobility and overall strength  Pt would benefit from continued skilled OT services to address the following deficits: ADLs, functional mobility, static and dynamic standing balance and tolerance, kitchen mobility and meal prep with use of RW during functional ambulation and overall functional strength and endurance needed to carryout BADL tasks in order for patient to safely discharge to the next level of care  Proceed with POC   Patient left seated in recliner at end of tx session with legs elevated, all needs met and call bell within reach       Goals STG achieved within 2 wks Performance at Initial Evaluation (08/25) Current Performance (last date completed)   Grooming while standing at sink Lana/I  8/27, 9/1 Supervision  9/1 MI  8/26 Supervision     ADL transfers  Lana/I  9/1 CGA 9/1 MI  8/29 STS MI from recliner, SUP STS from toilet  8/27 STS MI and DS SPT-- good safety + RW   8/26 STS and SPT supervision    Bathroom mobility  Lana/I CGA with RW 8/29 DS with RW   UB ADL  Lana/I  9/1 Nathan  9/1 MI  8/26 Bathing MI; dressing MI    LB ADL, AE PRN Lana/I modA  8/26 min assist with AE ed except socks max assist    Toileting/clothing management and hygiene Lana/I modA 8/29 Mod A for posterior thoroughness   8/26 hygiene supervision; CM supervision (min for thoroughness when fatigued)   Dynamic standing balance Fair +   8/29, 9/1 Fair - 9/1 F+   8/27 Fair/Fair+    Increase standing tolerance for inc'd safety with standing purposeful tasks > 10 minutes  9/1 ~ 4 minutes 9/1 10 min with kitchen tasks and mobility  8/29 7 min with toileting and mob   8/27 5 min + SOB    Participate in therex 1-3x/week for inc'd overall stamina/activity tolerance for purposeful tasks To complete  8/27, 9/1 9/1 3# and 2# dowel shoulder planes 2x10  8/27 2# weighted ball in all planes with good tolerance for 2x10    Shower stall transfer with grab bar Lana/I  9/1 9/1 MI +L GB  8/27 Supervision     Kitchen mobility for inc'd independence and safety negotiating kitchen environment Lana/I  9/1 9/1 MI with RW  8/27 simulated in room DS +RW     Light meal prep Lana/I   9/1 SUP with RW  8/27 simulated in room DS + walker tray    Bed mobility with HOB flat  Lana/I    8/27 Supervision      Liliana Palencia MOT,OTR/L                          DISCHARGE PLANNING:    UNCHANGED

## 2020-09-02 NOTE — SOCIAL WORK
[facility] held with patient and patient's son by phone  OT, DAVIE and DON present  Baseline care plan reviewed, pt and family agreeable to care parr  Pt continues to make progress in therapy  Goal is for discharge on Tuesday  Pt to continue to practice steps  Therapy recommending supervision on steps at home for safety  Pt may want a walker basket for discharge but would like to think about it  Pt would like to use [facility], SW will send a referral  Son to provide transport home at discharge  SW will continue to follow
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
## 2020-09-03 PROCEDURE — 97530 THERAPEUTIC ACTIVITIES: CPT

## 2020-09-03 PROCEDURE — 97535 SELF CARE MNGMENT TRAINING: CPT

## 2020-09-03 PROCEDURE — 97116 GAIT TRAINING THERAPY: CPT

## 2020-09-03 PROCEDURE — 97110 THERAPEUTIC EXERCISES: CPT

## 2020-09-03 RX ADMIN — APIXABAN 2.5 MG: 2.5 TABLET, FILM COATED ORAL at 17:18

## 2020-09-03 RX ADMIN — LEVOTHYROXINE SODIUM 75 MCG: 75 TABLET ORAL at 05:36

## 2020-09-03 RX ADMIN — APIXABAN 2.5 MG: 2.5 TABLET, FILM COATED ORAL at 09:10

## 2020-09-03 RX ADMIN — CEPHALEXIN 500 MG: 500 CAPSULE ORAL at 09:10

## 2020-09-03 RX ADMIN — CITALOPRAM HYDROBROMIDE 20 MG: 20 TABLET ORAL at 09:10

## 2020-09-03 RX ADMIN — RUXOLITINIB 10 MG: 10 TABLET ORAL at 09:15

## 2020-09-03 RX ADMIN — METOPROLOL TARTRATE 25 MG: 50 TABLET, FILM COATED ORAL at 09:10

## 2020-09-03 RX ADMIN — CEPHALEXIN 500 MG: 500 CAPSULE ORAL at 20:44

## 2020-09-03 RX ADMIN — MELATONIN TAB 3 MG 3 MG: 3 TAB at 21:29

## 2020-09-03 NOTE — PLAN OF CARE
Problem: OCCUPATIONAL THERAPY ADULT  Goal: Performs self-care activities at highest level of function for planned discharge setting  See evaluation for individualized goals  Description: Treatment Interventions: ADL retraining, Functional transfer training, UE strengthening/ROM, Endurance training, Patient/family training, Continued evaluation, Activityengagement, Energy conservation          See flowsheet documentation for full assessment, interventions and recommendations  Outcome: Progressing  Note: Limitation: Decreased ADL status, Decreased UE strength, Decreased Safe judgement during ADL, Decreased endurance, Decreased high-level ADLs  Prognosis: Good    Pt making gains toward OT goals  Participated in  OT sessions  Pt achieved 4/13 goals as per POC  Please see above for remaining deficits  Pt is close to achieving additional goals w/ functional consistency  She does well w/ STS transfers and ambulation, but can be unsteady during SPT's  In addition, she does not use good hand placement, but has not had any LOB  Continues to fatigue w/ minimal to moderate exertion  Educated on LB AE to increase independence w/ dressing  Pt must be more independent in order to return home  Continue OT to achieve goals and maximize function  POC set to  2020, may need additional time pending progress over the next few days  OT to reassess early next week        OT Discharge Recommendation: Return to previous environment with social support

## 2020-09-03 NOTE — PLAN OF CARE
Problem: PHYSICAL THERAPY ADULT  Goal: Performs mobility at highest level of function for planned discharge setting  See evaluation for individualized goals  Description: Treatment/Interventions: Functional transfer training, LE strengthening/ROM, Elevations, Therapeutic exercise, Endurance training, Patient/family training, Equipment eval/education, Bed mobility, Gait training, Spoke to nursing, Spoke to case management  Equipment Recommended: Walker(progress to LRAD)       See flowsheet documentation for full assessment, interventions and recommendations  Outcome: Progressing  Note: Prognosis: Good  Problem List: Decreased strength, Decreased endurance, Impaired balance  Assessment: Pt did well on FF of steps - managed RW on curb step with slight difficulty  Pt appeared slightly unsteady on turns and with backing up in BR , therefore , did not issue I sign yet  Pt made aware and understands - pt also cued for increase foot clearance with amb as she tends to shuffle feet at times  Barriers to Discharge: Inaccessible home environment     PT Discharge Recommendation: (as per primary PT recomm  )     PT - OK to Discharge: No    See flowsheet documentation for full assessment

## 2020-09-03 NOTE — ASSESSMENT & PLAN NOTE
· Hemoglobin baseline 9-10 most recent hemoglobin prior to hospital discharge 7 4  · Hemoglobin 6 6 symptomatic with lightheadedness  · Iron panel borderline for iron deficiency  · Type and screen  · Transfused 2 units of packed red blood cells 1 unit today at the infusion center and 1 unit on 08/31/2020 discussed with infusion center per scheduling    · Check fecal occult blood

## 2020-09-03 NOTE — PHYSICAL THERAPY NOTE
62' session     09/03/20 0758   Pain Assessment   Pain Assessment Tool 0-10   Pain Score 3   Pain Location/Orientation   (R flank)   Pain Onset/Description Frequency: Intermittent  (twinge)   Patient's Stated Pain Goal No pain   Hospital Pain Intervention(s) Ambulation/increased activity   Restrictions/Precautions   Other Precautions Fall Risk   General   Chart Reviewed Yes   Family/Caregiver Present No   Cognition   Overall Cognitive Status WFL   Arousal/Participation Alert; Cooperative   Attention Within functional limits   Following Commands Follows all commands and directions without difficulty   Comments flat affect   Subjective   Subjective i just get estrellita twing in my side once in a while   Transfers   Sit to Stand 6  Modified independent   Stand to Sit 6  Modified independent   Stand pivot 5  Supervision   Additional items   (occ slight unsteadiness with turns)   Additional Comments stood at sink for 4'  to clean and олег dentures with F balance- pt need A to олег socks adn shoes   Ambulation/Elevation   Gait pattern Decreased foot clearance  (slow pace)   Gait Assistance 6  Modified independent   Assistive Device Rolling walker   Distance 55',40',145',30' x 2   Stair Management Assistance 5  Supervision   Stair Management Technique   (2 hands L rail, step to)   Number of Stairs 12  (on FF- sat in chair at top to rest)   Curbs 1 + 1 with RW and S   Balance   Static Standing Fair   Dynamic Standing Fair   Ambulatory Fair -   Endurance Deficit   Endurance Deficit Yes   Activity Tolerance   Activity Tolerance Patient tolerated treatment well   Exercises   Hip Flexion Bilateral;AROM;20 reps   Hip Abduction   (isometrics x 20)   Hip Adduction   (ball squeeze x 20)   Knee AROM Long Arc Quad   (2# x 20)   Ankle Pumps Bilateral;AROM;20 reps   Assessment   Prognosis Good   Problem List Decreased strength;Decreased endurance; Impaired balance   Assessment Pt did well on FF of steps - managed RW on curb step with slight difficulty  Pt appeared slightly unsteady on turns and with backing up in BR , therefore , did not issue I sign yet  Pt made aware and understands - pt also cued for increase foot clearance with amb as she tends to shuffle feet at times   Barriers to Discharge Inaccessible home environment   Goals   Patient Goals no   I don't have any   STG Expiration Date 09/08/20   PT Treatment Day 8   Plan   Treatment/Interventions   (cont as per pOC)   Progress Progressing toward goals   PT Frequency   (5-7x/wk)     Goals STG achieved within 2 weeks Performance at Initial Evaluation (8/25/2020) Last date completed      Bed mobility skills including rolling and repositioning to prevent skin breakdown and decrease caregiver burden            modified independent assistance     ACHIEVED  (HOB flat, no rail)               9/2            Supine to sit transitions to increase ease of transfer, allow pt to get out of bed, and decrease caregiver burden     Side Note (effective 8/27/20): Pt uses R side of bed at home          modified independent assistance     ONGOING  (HOB flat, no rail)               9/2         Sit to supine transitions to increase ease of transfer, allow pt to get back into bed, and decrease caregiver burden          modified independent assistance     ACHIEVED  (HOB flat, no rail)                  9/2      Functional transfers to facilitate safe return to previous living environment        modified independent assistance  PARTIALLY ACHIEVED   (for sit <->stand; but not for SPT with RW)       supervision assistance x1    9/3      Ambulation with least restrictive AD; including at least 2 turns for safe household distance ambulation          modified independent assistance  PARTIALLY ACHIEVED   (for household distance portion of goal; but not for assistance level)          supervision assistance x1       9/3      Ascend/descend a full flight of steps with left handrail, with device prn, for safe access to previous living environment     Side Note (effective 9/2/20): Pt reported her son can Supervise her on the steps       Goal Modified   Effective 9/2/20     Supervision          Not attempted 2* fatigue          9/3      Ascend/descend a curb step, using appropriate AD and method, no handrails, for safe access to previous living environment     Side Note (effective 9/2/20): Pt reported her son can Supervise her on the steps          Goal Modified   Effective 9/2/20    Supervision          Not attempted 2* fatigue          9/3      Improve standing functional balance to allow for pt to  object from floor            modified independent assistance  ACHIEVED  (with reacher)             Not attempted 2* fatigue          9/2

## 2020-09-03 NOTE — ASSESSMENT & PLAN NOTE
Patient had prolonged hospitalization for 2 weeks  She was admitted and [facility] from August 10 through August 24th for incarcerated peristomal hernia at the ileal conduct side  She is status post ex laparotomy and kirt stomal hernia repair with mesh and lysis of adhesions  He had postoperative difficulty with bowel function requiring bowel rest and NG tube placement which is resolved  IR placed IJ line due to difficulty accessing peripheral IV  Following the surgery patient was found to be severely deconditioned with inability to resume activity of daily living due to weakness and decreased endurance  Therefore recommended to be admitted at rehab for further strengthening  Progressing with physical therapy and occupational therapy

## 2020-09-03 NOTE — PROGRESS NOTES
Progress Note - Genesis  [birth year], 77 y o  female MRN: [id number]    Unit/Bed#: -01 Encounter: [id number]    Primary Care Provider: Irene Mac MD   Date and time admitted to hospital: 8/24/2020  7:13 PM        Depression  Assessment & Plan  Continue citalopram 20 mg daily    History of shingles  Assessment & Plan  · About 2 weeks ago she had dermatomal skin lesion consistent with shingles on the right gluteus region lesions are crusted no longer need isolation  There is no post herpetic neuralgia  History of Clostridioides difficile colitis  Assessment & Plan  · Last infection 2019  · Treated with vancomycin prophylaxis while on Keflex in recent hospitalization  · Completed prophylaxis vancomycin 48 hours following cessation of antibiotic    Anemia in CKD (chronic kidney disease)  Assessment & Plan  · Hemoglobin baseline 9-10 most recent hemoglobin prior to hospital discharge 7 4  · Hemoglobin 6 6 symptomatic with lightheadedness  · Iron panel borderline for iron deficiency  · Type and screen  · Transfused 2 units of packed red blood cells 1 unit today at the infusion center and 1 unit on 08/31/2020 discussed with infusion center per scheduling  · Check fecal occult blood    Polycythemia vera (HCC)  Assessment & Plan  · Follow-up with heme oncology  · Has been on Jakafi 10 mg daily will continue -patient brought from home    Hypertension  Assessment & Plan  Continue metoprolol tartrate  Was on nifedipine will resume if blood pressure above 130/80 given CKD and recent LUANN  Hypothyroidism  Assessment & Plan  Continue levothyroxine 75 mcg daily    Obstructive uropathy  Assessment & Plan  · This is a chronic problem  She follow-up with urology 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 elevated bladder pressure leading to hydronephrosis and CKD  · Last seen urology July 2019 was scheduled for yearly follow-up      Stage 4 chronic kidney disease [facility])  Assessment & Plan  · Creatinine baseline 2-3 currently within baseline  · She has functional solitary right kidney with chronic 8 mm stone in the lower pole  · Outpatient follow-up with Nephrology September 24 20   · Intermittent IV fluids for worsening renal function  · Aggressively treat any evidence urinary tract infection given solitary right function kidney  Chronic saddle pulmonary embolism (HCC)  Assessment & Plan  · Continue home dose Eliquis 2 5 mg b i d  Unclear why reduce does given her age and weight does not meet criteria  · She does have history of subdural hematoma in the past  · Patient follow-up with heme oncology given history of polycythemia vera  * Encounter for surgical aftercare following surgery on the digestive system  Assessment & Plan  Patient had prolonged hospitalization for 2 weeks  She was admitted and [facility] from August 10 through August 24th for incarcerated peristomal hernia at the ileal conduct side  She is status post ex laparotomy and kirt stomal hernia repair with mesh and lysis of adhesions  He had postoperative difficulty with bowel function requiring bowel rest and NG tube placement which is resolved  IR placed IJ line due to difficulty accessing peripheral IV  Following the surgery patient was found to be severely deconditioned with inability to resume activity of daily living due to weakness and decreased endurance  Therefore recommended to be admitted at rehab for further strengthening  Progressing with physical therapy and occupational therapy  VTE Pharmacologic Prophylaxis:   Pharmacologic: Apixaban (Eliquis)  Mechanical VTE Prophylaxis in Place: Yes    Patient Centered Rounds: I have performed bedside rounds with nursing staff today  Education and Discussions with Family / Patient: patient     Time Spent for Care: 20 minutes    More than 50% of total time spent on counseling and coordination of care as described above     Current Length of Stay: 10 day(s)    Current Patient Status: SNF Short Term Inpatient       Code Status: Level 1 - Full Code      Subjective:   Patient seen and examined no acute complaints at this time such as pain, sob, swelling, cp  Objective:     Vitals:   Temp (24hrs), Av 9 °F (37 2 °C), Min:98 3 °F (36 8 °C), Max:99 4 °F (37 4 °C)    Temp:  [98 3 °F (36 8 °C)-99 4 °F (37 4 °C)] 99 4 °F (37 4 °C)  HR:  [72-89] 72  Resp:  [19-20] 19  BP: (114-125)/(66-82) 114/66  SpO2:  [92 %-98 %] 92 %  Body mass index is 35 56 kg/m²  Input and Output Summary (last 24 hours): Intake/Output Summary (Last 24 hours) at 9/3/2020 1158  Last data filed at 9/3/2020 0807  Gross per 24 hour   Intake 680 ml   Output 600 ml   Net 80 ml       Physical Exam:     Physical Exam  Constitutional:       Appearance: She is well-developed  HENT:      Head: Normocephalic and atraumatic  Mouth/Throat:      Pharynx: No oropharyngeal exudate  Eyes:      Pupils: Pupils are equal, round, and reactive to light  Neck:      Musculoskeletal: Normal range of motion and neck supple  Cardiovascular:      Rate and Rhythm: Normal rate and regular rhythm  Heart sounds: Normal heart sounds  Pulmonary:      Effort: Pulmonary effort is normal  No respiratory distress  Breath sounds: Normal breath sounds  Abdominal:      General: Bowel sounds are normal       Palpations: Abdomen is soft  Tenderness: There is no abdominal tenderness  Skin:     General: Skin is warm and dry  Neurological:      General: No focal deficit present  Mental Status: She is alert and oriented to person, place, and time  Mental status is at baseline     Psychiatric:         Mood and Affect: Mood normal            Additional Data:     Labs:    Results from last 7 days   Lab Units 20  0459 20  1030   WBC Thousand/uL 7 50 12 20*   HEMOGLOBIN g/dL 7 0* 6 6*   HEMATOCRIT % 21 4* 20 1*   PLATELETS Thousands/uL 287 411 BANDS PCT %  --  6   NEUTROS PCT % 78*  --    LYMPHS PCT % 15*  --    LYMPHO PCT %  --  9*   MONOS PCT % 5  --    MONO PCT %  --  1   EOS PCT % 1  --              Results from last 7 days   Lab Units 08/27/20  1431   POC GLUCOSE mg/dl 140     Results from last 7 days   Lab Units 09/02/20  0531   HEMOGLOBIN A1C % 5 5               * I Have Reviewed All Lab Data Listed Above  * Additional Pertinent Lab Tests Reviewed: All Labs Within Last 24 Hours Reviewed    Imaging:    Imaging Reports Reviewed Today Include none  Recent Cultures (last 7 days):           Last 24 Hours Medication List:   Current Facility-Administered Medications   Medication Dose Route Frequency Provider Last Rate    apixaban  2 5 mg Oral BID Rupali S Elbert, CRNP      cephalexin  500 mg Oral Q12H [facility] Sheyla Nash MD      citalopram  20 mg Oral Daily Rupali S Elbert, CRNP      levothyroxine  75 mcg Oral Early Morning Rupali S Elbert, CRNP      melatonin  3 mg Oral HS Rupali S Elbert, CRNP      metoprolol tartrate  25 mg Oral Q12H [facility] Rupali S Elbert, CRNP      oxyCODONE  2 5 mg Oral Q4H PRN Rupali S Elbert, CRNP      oxyCODONE  5 mg Oral Q4H PRN Rupali S Elbert, CRNP      ruxolitinib  10 mg Oral Daily Rupali S Elbert, CRNP      simethicone  80 mg Oral Q6H PRN Rupali S Elbert, CRNP          Today, Patient Was Seen By: Sheyla Nash MD    ** Please Note: Dictation voice to text software may have been used in the creation of this document   **

## 2020-09-03 NOTE — OCCUPATIONAL THERAPY NOTE
Occupational Therapy Treatment & Progress Note        TIME: 13:50-14:46 (56 mins)  VITALS: SPO2 98% RA HR 65  PAIN: Denies  COGNITION: WFL  PRECAUTIONS: Fall risk, ostomy     EXPIRATION DATE: 9/8/2020  TREATMENT DAY: 7  DISCHARGE RECOMMENDATION: Home with social support  DME NEEDED FOR DISCHARGE: Walker basket if needed     SESSION DETAILS / ASSESSMENT: Pt making progress toward goals  Improvement noted in LB dressing, toileting, and UE exercises today  Educated pt on [facility] [facility] for doffing/donning socks and shoes  OT placed elastic laces in sneakers, which were helpful  Pt required increased assist and reminders on how to use equipment, but she finds them helpful  Continue to practice and reinforce methods to increase independence  Pt doing well w/ transfers but can be unsteady at times during turns  Continue OT to achieve goals and maximize function  See below for progress update   Pt remains in recliner w/ all needs and LE's elevated at end of session            Goals STG achieved within 2 wks Performance at Initial Evaluation (08/25) Current Performance (last date completed)   Grooming while standing at sink Lana/I  8/27, 9/1, 9/3  MET   [address] [facility] 9/3- MI to use hand  in stance   ADL transfers  Lana/I  9/1, 9/2  CONT    CGA 9/3- STS MI w/ poor hand placement; (S) SPT using RW; MI ambulation using RW     Bathroom mobility  Lana/I  9/2   CONT   CGA with RW 9/3- (S) --> MI using RW; can be unsteady at times during turns     UB ADL  Lana/I  8/26, 9/1  MET   Nathan  9/1 MI  8/26 Bathing MI; dressing MI      LB ADL, AE PRN Lana/I  CONT modA 9/3- Educated on AE; Mod doff/don socks using AE; Min don shoes using AE (see notes)   8/26 min assist with AE ed except socks max assist      Toileting/clothing management and hygiene Lana/I  CONT modA 9/3- (S) empty ostomy in stance & for CM  8/29 Mod A for posterior thoroughness   8/26 hygiene supervision; CM supervision (min for thoroughness when fatigued)     Dynamic standing balance Fair +   , ,   MET Fair - 9/3- F--> F+   Increase standing tolerance for inc'd safety with standing purposeful tasks > 10 minutes    CONT ~ 4 minutes  4-5 min    10 min with kitchen tasks and mobility   7 min with toileting and mob    5 min + SOB    Participate in therex 1-3x/week for inc'd overall stamina/activity tolerance for purposeful tasks To complete  , , 9/3  MET   9/3- 3# x 20 reps for 3 planes; fatigued    3# and 2# dowel shoulder planes 2x10   2# weighted ball in all planes with good tolerance for 2x10    Shower stall transfer with grab bar Lana/I    CONT      MI +L GB   Supervision     Kitchen mobility for inc'd independence and safety negotiating kitchen environment Lana/I    CONT    MI with RW   simulated in room DS +RW     Light meal prep Lana/I  CONT      SUP with RW   simulated in room DS + walker tray    Bed mobility with HOB flat  Lana/I  CONT     9/3- (S) w/ increased time & effort    Supervision       PROGRESS NOTE : Pt making gains toward OT goals  Participated in  OT sessions  Pt achieved 4/13 goals as per POC  Please see above for remaining deficits  Pt is close to achieving additional goals w/ functional consistency  She does well w/ STS transfers and ambulation, but can be unsteady during SPT's  In addition, she does not use good hand placement, but has not had any LOB  Continues to fatigue w/ minimal to moderate exertion  Educated on LB AE to increase independence w/ dressing  Pt must be more independent in order to return home  Continue OT to achieve goals and maximize function  POC set to  2020, may need additional time pending progress over the next few days  OT to reassess early next week       Debbi Davis MS, OTR/L

## 2020-09-04 PROCEDURE — 97110 THERAPEUTIC EXERCISES: CPT

## 2020-09-04 PROCEDURE — 99308 SBSQ NF CARE LOW MDM 20: CPT | Performed by: INTERNAL MEDICINE

## 2020-09-04 PROCEDURE — 97535 SELF CARE MNGMENT TRAINING: CPT

## 2020-09-04 PROCEDURE — 97116 GAIT TRAINING THERAPY: CPT

## 2020-09-04 PROCEDURE — 97530 THERAPEUTIC ACTIVITIES: CPT

## 2020-09-04 RX ADMIN — LEVOTHYROXINE SODIUM 75 MCG: 75 TABLET ORAL at 05:56

## 2020-09-04 RX ADMIN — CEPHALEXIN 500 MG: 500 CAPSULE ORAL at 08:52

## 2020-09-04 RX ADMIN — METOPROLOL TARTRATE 25 MG: 50 TABLET, FILM COATED ORAL at 08:52

## 2020-09-04 RX ADMIN — RUXOLITINIB 10 MG: 10 TABLET ORAL at 08:53

## 2020-09-04 RX ADMIN — CITALOPRAM HYDROBROMIDE 20 MG: 20 TABLET ORAL at 08:52

## 2020-09-04 RX ADMIN — CEPHALEXIN 500 MG: 500 CAPSULE ORAL at 20:48

## 2020-09-04 RX ADMIN — APIXABAN 2.5 MG: 2.5 TABLET, FILM COATED ORAL at 08:52

## 2020-09-04 RX ADMIN — APIXABAN 2.5 MG: 2.5 TABLET, FILM COATED ORAL at 17:32

## 2020-09-04 RX ADMIN — METOPROLOL TARTRATE 25 MG: 50 TABLET, FILM COATED ORAL at 20:43

## 2020-09-04 RX ADMIN — MELATONIN TAB 3 MG 3 MG: 3 TAB at 21:48

## 2020-09-04 NOTE — PHYSICAL THERAPY NOTE
55' session     09/04/20 1406   Pain Assessment   Pain Assessment Tool Pain Assessment not indicated - pt denies pain   General   Chart Reviewed Yes   Family/Caregiver Present No   Cognition   Overall Cognitive Status WFL   Arousal/Participation Alert; Cooperative   Attention Within functional limits   Following Commands Follows all commands and directions without difficulty   Subjective   Subjective feels ok - thinking about Tues ( dc date)   Bed Mobility   Supine to Sit 6  Modified independent   Sit to Supine 6  Modified independent   Additional Comments bed flat no rail   Transfers   Sit to Stand 6  Modified independent   Stand to Sit 6  Modified independent   Stand pivot 6  Modified independent   Ambulation/Elevation   Gait pattern Decreased foot clearance  (slow pace)   Gait Assistance 6  Modified independent   Assistive Device Rolling walker   Distance 140',30' x 2,90',,50' with turns and managing obstacles   Stair Management Assistance   (DS)   Stair Management Technique Step to pattern  (2 hands on L rail )   Number of Stairs 12  (on FF - rested in chair at top)   Balance   Static Standing Fair +   Dynamic Standing Fair   Ambulatory Fair   Endurance Deficit   Endurance Deficit Yes   Activity Tolerance   Activity Tolerance Patient tolerated treatment well   Exercises   Hip Flexion   (2# x 20)   Knee AROM Long Arc Quad   (2# x 20)   Ankle Pumps Bilateral;AROM;20 reps   Balance training  alternating 4" step taps with no UE support and CG to occ min A for 10 reps   Assessment   Prognosis Good   Problem List Decreased strength;Decreased endurance   Assessment Pt is doing better with bed mobility - gt is steady - issued I sign for use of RW to BR and in the swanson - pt aware that this is for during then day and that she may call for A prn   SS to check into coverage for 2nd RW for one up and one down in home   Barriers to Discharge Inaccessible home environment   Goals   Patient Goals no ( when asked)   STG Expiration Date 09/08/20   PT Treatment Day 9   Plan   Treatment/Interventions   (cont as per POC)   Progress Progressing toward goals   PT Frequency   (5-7x/wk)     Goals STG achieved within 2 weeks Performance at Initial Evaluation (8/25/2020) Last date completed      Bed mobility skills including rolling and repositioning to prevent skin breakdown and decrease caregiver burden            modified independent assistance     ACHIEVED  (HOB flat, no rail)               9/2            Supine to sit transitions to increase ease of transfer, allow pt to get out of bed, and decrease caregiver burden     Side Note (effective 8/27/20): Pt uses R side of bed at home          modified independent assistance     ONGOING  (HOB flat, no rail)               9/4         Sit to supine transitions to increase ease of transfer, allow pt to get back into bed, and decrease caregiver burden          modified independent assistance     ACHIEVED  (HOB flat, no rail)                  9/4      Functional transfers to facilitate safe return to previous living environment        modified independent assistance  PARTIALLY ACHIEVED   (for sit <->stand; but not for SPT with RW)       supervision assistance x1    9/4      Ambulation with least restrictive AD; including at least 2 turns for safe household distance ambulation          modified independent assistance  PARTIALLY ACHIEVED   (for household distance portion of goal; but not for assistance level)          supervision assistance x1       9/4      Ascend/descend a full flight of steps with left handrail, with device prn, for safe access to previous living environment     Side Note (effective 9/2/20): Pt reported her son can Supervise her on the steps       Goal Modified   Effective 9/2/20     Supervision          Not attempted 2* fatigue          9/4      Ascend/descend a curb step, using appropriate AD and method, no handrails, for safe access to previous living environment     Side Note (effective 9/2/20): Pt reported her son can Supervise her on the steps          Goal Modified   Effective 9/2/20    Supervision          Not attempted 2* fatigue          9/4      Improve standing functional balance to allow for pt to  object from floor            modified independent assistance  ACHIEVED  (with reacher)             Not attempted 2* fatigue          9/2

## 2020-09-04 NOTE — PLAN OF CARE
Problem: OCCUPATIONAL THERAPY ADULT  Goal: Performs self-care activities at highest level of function for planned discharge setting  See evaluation for individualized goals  Description: Treatment Interventions: ADL retraining, Functional transfer training, UE strengthening/ROM, Endurance training, Patient/family training, Continued evaluation, Activityengagement, Energy conservation          See flowsheet documentation for full assessment, interventions and recommendations  Outcome: Progressing  Note: Limitation: Decreased ADL status, Decreased UE strength, Decreased Safe judgement during ADL, Decreased endurance, Decreased high-level ADLs  Prognosis: Good    Pt continues to make progress toward OT goals  Improvement noted in ADLs this morning  Required minimal verbal cues on how to use equipment from yesterday  However, able to complete today after education w/ increased time  Educated pt on having long handled sponge to wash feet at home  Her socks are narrow and tight causing struggles to get on device  However, another sock was stretchier and she completed with ease  Discussed looking for more stretchy socks at home to make it easier; agreed  States she was very fatigued s/p ADL  Remains below functional baseline  Continue OT to achieve goals and maximize function  Pt seated in recliner w/ LE's elevated at end of session        OT Discharge Recommendation: Return to previous environment with social support

## 2020-09-04 NOTE — NURSING NOTE
Dressing to midline abdomen changed as ordered, RLQ dressing also changed as ordered  Illeal conduit wafer/bag changed d/t soiling/lifting of dressing  No adverse effects from dressing changes  Res did c/o slight pain when abdomen palpated, but was manageable  Illeal conduit is draining yellow urine, some slight pinkish tint noted   Will continue to monitor

## 2020-09-04 NOTE — SOCIAL WORK
DAVIE met with patient to discuss discharge Tuesday  Patient agreeable to discharge Tuesday  DAVIE discussed [address], patient signed  Copy faxed to insurance and placed in scan bin  Patient's son will pick her up around 11:00 AM      Patient accepted with [facility] for RN/PT/OT services  Patient would like a second RW: OOP $20 70  Son requested [employer] to call him so he can make payment

## 2020-09-04 NOTE — OCCUPATIONAL THERAPY NOTE
Occupational Therapy Treatment Note     TIME: 9:02-9:55 (53 mins)  VITALS: Stable t/o session   PAIN: Denies  COGNITION: WFL  PRECAUTIONS: Fall risk, ostomy     EXPIRATION DATE: 9/8/2020  TREATMENT DAY: 8  DISCHARGE RECOMMENDATION: Home with social support  DME NEEDED FOR DISCHARGE: Walker basket if needed  ADDITIONAL COMMENTS: Increased drainage noted inside bandage  Notified RN       SESSION DETAILS / ASSESSMENT: Pt continues to make progress toward OT goals  Improvement noted in ADLs this morning  Required minimal verbal cues on how to use equipment from yesterday  However, able to complete today after education w/ increased time  Educated pt on having long handled sponge to wash feet at home  Her socks are narrow and tight causing struggles to get on device  However, another sock was stretchier and she completed with ease  Discussed looking for more stretchy socks at home to make it easier; agreed  Educated pt on donning panties and pants then standing once to pull up to save energy  States she was very fatigued s/p ADL  Remains below functional baseline  Continue OT to achieve goals and maximize function   Pt seated in recliner w/ LE's elevated at end of session            Goals STG achieved within 2 wks Performance at Initial Evaluation (08/25) Current Performance (last date completed)   Grooming while standing at sink Lana/I  8/27, 9/1, 9/3  MET    Supervision  9/3- MI to use hand  in stance   ADL transfers  Lana/I  9/1, 9/2  CONT     CGA 9/4- STS MI w/ poor hand placement   9/3- STS MI w/ poor hand placement; (S) SPT using RW; MI ambulation using RW      Bathroom mobility  Lana/I  9/2   CONT    CGA with RW 9/3- (S) --> MI using RW; can be unsteady at times during turns      UB ADL  Lana/I  8/26, 9/1, 9/4  MET    Nathan 9/4- MI       LB ADL, AE PRN Lana/I  CONT modA 9/4- Min bathing; (S) dressing using AE w/ increased time and minimal verbal cues   9/3- Educated on AE; Mod doff/don socks using AE; Min don shoes using AE (see notes)   8/26 min assist with AE ed except socks max assist       Toileting/clothing management and hygiene Lana/I  CONT modA 9/4- (S) but wanted OT to check for cleanliness  9/3- (S) empty ostomy in stance & for CM  8/29 Mod A for posterior thoroughness   8/26 hygiene supervision; CM supervision (min for thoroughness when fatigued)      Dynamic standing balance Fair +   8/29, 9/1, 9/2, 9/4  MET Fair - 9/4- F+  9/3- F--> F+   Increase standing tolerance for inc'd safety with standing purposeful tasks > 10 minutes  9/1  CONT ~ 4 minutes 9/2 4-5 min   9/1 10 min with kitchen tasks and mobility  8/29 7 min with toileting and mob   8/27 5 min + SOB    Participate in therex 1-3x/week for inc'd overall stamina/activity tolerance for purposeful tasks To complete  8/27, 9/1, 9/3  MET   9/3- 3# x 20 reps for 3 planes; fatigued   9/1 3# and 2# dowel shoulder planes 2x10  8/27 2# weighted ball in all planes with good tolerance for 2x10    Shower stall transfer with grab bar Lana/I  9/1  CONT      9/1 MI +L GB  8/27 Supervision     Kitchen mobility for inc'd independence and safety negotiating kitchen environment Lana/I  9/1  CONT   9/1 MI with RW  8/27 simulated in room DS +RW     Light meal prep Lana/I  CONT      9/1 SUP with RW  8/27 simulated in room DS + walker tray    Bed mobility with HOB flat  Lana/I  CONT      9/3- (S) w/ increased time & effort   8/27 Supervision     [address] MS, OTR/L

## 2020-09-04 NOTE — PLAN OF CARE
Problem: PHYSICAL THERAPY ADULT  Goal: Performs mobility at highest level of function for planned discharge setting  See evaluation for individualized goals  Description: Treatment/Interventions: Functional transfer training, LE strengthening/ROM, Elevations, Therapeutic exercise, Endurance training, Patient/family training, Equipment eval/education, Bed mobility, Gait training, Spoke to nursing, Spoke to case management  Equipment Recommended: Walker(progress to LRAD)       See flowsheet documentation for full assessment, interventions and recommendations  Outcome: Progressing  Note: Prognosis: Good  Problem List: Decreased strength, Decreased endurance  Assessment: Pt is doing better with bed mobility - gt is steady - issued I sign for use of RW to BR and in the [place]anson - pt aware that this is for during then day and that she may call for A prn  SS to check into coverage for 2nd RW for one up and one down in home  Barriers to Discharge: [address] home environment     PT Discharge Recommendation: (as per primary PT recomm  )     PT - OK to Discharge: No    See flowsheet documentation for full assessment

## 2020-09-05 PROCEDURE — 97110 THERAPEUTIC EXERCISES: CPT

## 2020-09-05 PROCEDURE — 97116 GAIT TRAINING THERAPY: CPT

## 2020-09-05 RX ADMIN — LEVOTHYROXINE SODIUM 75 MCG: 75 TABLET ORAL at 05:59

## 2020-09-05 RX ADMIN — CITALOPRAM HYDROBROMIDE 20 MG: 20 TABLET ORAL at 09:18

## 2020-09-05 RX ADMIN — CEPHALEXIN 500 MG: 500 CAPSULE ORAL at 09:18

## 2020-09-05 RX ADMIN — APIXABAN 2.5 MG: 2.5 TABLET, FILM COATED ORAL at 09:18

## 2020-09-05 RX ADMIN — MELATONIN TAB 3 MG 3 MG: 3 TAB at 21:49

## 2020-09-05 RX ADMIN — APIXABAN 2.5 MG: 2.5 TABLET, FILM COATED ORAL at 17:19

## 2020-09-05 RX ADMIN — CEPHALEXIN 500 MG: 500 CAPSULE ORAL at 21:49

## 2020-09-05 RX ADMIN — METOPROLOL TARTRATE 25 MG: 50 TABLET, FILM COATED ORAL at 09:18

## 2020-09-05 RX ADMIN — RUXOLITINIB 10 MG: 10 TABLET ORAL at 09:29

## 2020-09-05 NOTE — PLAN OF CARE
Problem: PHYSICAL THERAPY ADULT  Goal: Performs mobility at highest level of function for planned discharge setting  See evaluation for individualized goals  Description: Treatment/Interventions: Functional transfer training, LE strengthening/ROM, Elevations, Therapeutic exercise, Endurance training, Patient/family training, Equipment eval/education, Bed mobility, Gait training, Spoke to nursing, Spoke to case management  Equipment Recommended: Walker(progress to LRAD)       See flowsheet documentation for full assessment, interventions and recommendations  Outcome: Progressing  Note: Prognosis: Good  Problem List: Decreased strength, Decreased endurance, Decreased mobility  Assessment: Patient tolerated treatement well  Required considerable cueing and prompting to participate in PT session  Reports is very tired today  She will continue to benenfit frosm skilled PT to maximize functional mobility  Barriers to Discharge: Inaccessible home environment     PT Discharge Recommendation: (as per primary PT recomm  )     PT - OK to Discharge: No    See flowsheet documentation for full assessment

## 2020-09-05 NOTE — PHYSICAL THERAPY NOTE
Physical TherapyTreatment Note    Patient's Name: Wilfredo Sawant    Admitting Diagnosis  S/P hernia repair [F25 [id number], [id number]]    Problem List  Patient Active Problem List   Diagnosis    Chronic saddle pulmonary embolism (HCC)    Stage 4 chronic kidney disease ([facility]    Bladder mass    Small bowel obstruction ([address]    Obstructive uropathy    Secondary hyperparathyroidism of renal origin ([facility] [facility] )    Hypothyroidism    Hypertension    Polycythemia vera ([facility] [facility]    Fall    Subdural hematoma, acute (HCC)    Intraventricular hemorrhage (HCC)    Diarrhea    Recurrent Clostridium difficile diarrhea    Anemia in CKD (chronic kidney disease)    Mass of urethra    Stage 5 chronic kidney disease not on chronic dialysis ([facility]    Thoracic compression fracture (HCC)    Hematuria    Abnormal finding on MRI of brain    Benign neoplasm of supratentorial region of brain ([facility] [address] [facility] )    Meningioma ([facility] [facility]    UTI (urinary tract infection)    Herpes zoster    Inverted T wave    Polycythemia    Encounter for surgical aftercare following surgery on the digestive system    History of Clostridioides difficile colitis    History of shingles    Depression       Past Medical History  Past Medical History:   Diagnosis Date    Anxiety     Chronic kidney disease (CKD), stage IV (severe) (HCC)     stage IV    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    Compression fracture of cervical spine (HCC)     Hydronephrosis     Hypertension     Hypothyroid     Incontinence     Lung mass     Improving on PET/CT 1/2016    Polycythemia vera ([facility] [address] [facility]     Pulmonary embolism ([facility] [facility] ) 2014    Urinary tract infection        Past Surgical History  Past Surgical History:   Procedure Laterality Date    ABDOMINAL ADHESION SURGERY N/A 8/9/2020    Procedure: LYSIS ADHESIONS;  Surgeon: Carlos Small DO;  [facility];  Service: General    BLADDER SUSPENSION      BOTOX INJECTION N/A 7/27/2016    Procedure: BOTOX INJECTION ;  Surgeon: Timoteo Franco MD;  [facility];  Service:    Kaveh [address], RADICAL WITH ILEOCONDUIT N/A 10/4/2016    Procedure: Noemí Venegas WITH ILEAL CONDUIT ;  Surgeon: Timoteo Franco MD;  [facility];  Service:    Ganga Lemme CYSTOSCOPY W/ RETROGRADES Bilateral 7/27/2016    Procedure: Evgeny Lax; RETROGRADE PYELOGRAM ;  Surgeon: Timoteo Franco MD;  [facility];  Service:    [address]      IR NON-TUNNELED CENTRAL LINE PLACEMENT  7/17/2020    IR NON-TUNNELED CENTRAL LINE PLACEMENT  8/14/2020    LAPAROTOMY N/A 8/9/2020    Procedure: LAPAROTOMY EXPLORATORY, PARASTOMAL HERNIA REPAIR WITH MESH;  Surgeon: Bria Oneal DO;  [facility];  Service: General    DE COLONOSCOPY FLX DX W/COLLJ SPEC WHEN PFRMD N/A 8/31/2016    Procedure: COLONOSCOPY;  Surgeon: Jess Barrera MD;  Location: [facility]; Service: Gastroenterology    DE CYSTOSCOPY,INSERT URETERAL STENT Bilateral 7/27/2016    Procedure: STENT INSERTION; EXCISION OF MESH ;  Surgeon: Timoteo Franco MD;  [facility];  Service: Urology    TONSILLECTOMY      TUBAL LIGATION      WISDOM TOOTH EXTRACTION         Vitals:    09/04/20 1100 09/04/20 1527 09/04/20 2043 09/05/20 0722   BP: 144/82 122/60 133/63 156/82   BP Location: Left arm Right arm  Right arm   Pulse: 73 71 75 82   Resp: 15 19  18   Temp: (!) 96 3 °F (35 7 °C) (!) 97 2 °F (36 2 °C)  (!) 97 2 °F (36 2 °C)   TempSrc: Temporal Temporal  Temporal   SpO2: 99% 90%  99%   Weight:       Height:           PT Treatment Time:32     09/05/20 1302   Pain Assessment   Pain Assessment Tool 0-10   Pain Score 1   Pain Location/Orientation Location: Abdomen   Restrictions/Precautions   Weight Bearing Precautions Per Order No   Other Precautions Fall Risk;Pain   General   Chart Reviewed Yes   Response to Previous Treatment Patient with no complaints from previous session     Family/Caregiver Present No   Cognition Overall Cognitive Status [facility]   Arousal/Participation Alert; Cooperative   Attention Within functional limits   Subjective   Subjective I am just tired today   Bed Mobility   Rolling R   (clinet declined bed activities))   Transfers   Sit to Stand 6  Modified independent   Additional items Increased time required   Stand to Sit 6  Modified independent   Additional items Increased time required   Stand pivot 6  Modified independent   Additional items Increased time required   Toilet transfer 6  Modified independent   Additional items Increased time required   Ambulation/Elevation   Gait pattern Excessively slow;Decreased foot clearance   Gait Assistance 6  Modified independent   Additional items Verbal cues   Assistive Device Rolling walker   Distance 120' x 2   Stair Management Technique Step to pattern   Number of Stairs 12   Curbs declined to perform today   Balance   Static Sitting Good   Dynamic Sitting Good   Static Standing Fair +   Dynamic Standing Fair +   Ambulatory Fair   Endurance Deficit   Endurance Deficit Yes   Exercises   Hip Flexion Sitting;20 reps;Bilateral   Hip Abduction Sitting;20 reps;Bilateral   Knee AROM Long Arc Quad Sitting;20 reps;AROM; Bilateral   Ankle Pumps Bilateral;20 reps;AROM; Sitting   Assessment   Prognosis Good   Problem List Decreased strength;Decreased endurance;Decreased mobility   Assessment Patient tolerated treatement well  Required considerable cueing and prompting to participate in PT session  Reports is very tired today  She will continue to benericardoit debi skilled PT to maximize functional mobility  Barriers to Discharge Inaccessible home environment   Goals   Patient Goals go home soon   STG Expiration Date 09/08/20   Short Term Goal #1 see grid   PT Treatment Day 10   Plan   Treatment/Interventions Functional transfer training;LE strengthening/ROM; Therapeutic exercise;Cognitive reorientation;Equipment eval/education; Bed mobility;Gait training   Progress Progressing toward goals   PT Frequency   (5-7x/week)   Recommendation   PT - OK to Discharge No   Additional Comments left in reclining chair, all needs within reach     Goals STG achieved within 2 weeks Performance at Initial Evaluation (8/25/2020) Last date completed      Bed mobility skills including rolling and repositioning to prevent skin breakdown and decrease caregiver burden            modified independent assistance     ACHIEVED  (HOB flat, no rail)               9/2            Supine to sit transitions to increase ease of transfer, allow pt to get out of bed, and decrease caregiver burden     Side Note (effective 8/27/20): Pt uses R side of bed at home          modified independent assistance     ONGOING  (HOB flat, no rail)               9/4         Sit to supine transitions to increase ease of transfer, allow pt to get back into bed, and decrease caregiver burden          modified independent assistance     ACHIEVED  (HOB flat, no rail)                  9/4      Functional transfers to facilitate safe return to previous living environment        modified independent assistance  PARTIALLY ACHIEVED   (for sit <->stand; but not for SPT with RW)       supervision assistance x1    9/5      Ambulation with least restrictive AD; including at least 2 turns for safe household distance ambulation          modified independent assistance  PARTIALLY ACHIEVED   (for household distance portion of goal; but not for assistance level)          supervision assistance x1       9/5      Ascend/descend a full flight of steps with left handrail, with device prn, for safe access to previous living environment     Side Note (effective 9/2/20): Pt reported her son can Supervise her on the steps       Goal Modified   Effective 9/2/20     Supervision          Not attempted 2* fatigue          9/5      Ascend/descend a curb step, using appropriate AD and method, no handrails, for safe access to previous living environment     Side Note (effective 9/2/20): Pt reported her son can Supervise her on the steps          Goal Modified   Effective 9/2/20    Supervision          Not attempted 2* fatigue          9/4      Improve standing functional balance to allow for pt to  object from floor            modified independent assistance  ACHIEVED  (with reacher)             Not attempted 2* fatigue          9/5

## 2020-09-06 RX ADMIN — MELATONIN TAB 3 MG 3 MG: 3 TAB at 22:03

## 2020-09-06 RX ADMIN — CEPHALEXIN 500 MG: 500 CAPSULE ORAL at 22:01

## 2020-09-06 RX ADMIN — LEVOTHYROXINE SODIUM 75 MCG: 75 TABLET ORAL at 05:00

## 2020-09-06 RX ADMIN — CEPHALEXIN 500 MG: 500 CAPSULE ORAL at 11:00

## 2020-09-06 RX ADMIN — APIXABAN 2.5 MG: 2.5 TABLET, FILM COATED ORAL at 11:00

## 2020-09-06 RX ADMIN — RUXOLITINIB 10 MG: 10 TABLET ORAL at 11:00

## 2020-09-06 RX ADMIN — CITALOPRAM HYDROBROMIDE 20 MG: 20 TABLET ORAL at 11:00

## 2020-09-06 RX ADMIN — APIXABAN 2.5 MG: 2.5 TABLET, FILM COATED ORAL at 17:27

## 2020-09-06 RX ADMIN — METOPROLOL TARTRATE 25 MG: 50 TABLET, FILM COATED ORAL at 22:02

## 2020-09-06 RX ADMIN — METOPROLOL TARTRATE 25 MG: 50 TABLET, FILM COATED ORAL at 11:00

## 2020-09-07 PROCEDURE — 97110 THERAPEUTIC EXERCISES: CPT | Performed by: PHYSICAL THERAPIST

## 2020-09-07 PROCEDURE — 97116 GAIT TRAINING THERAPY: CPT | Performed by: PHYSICAL THERAPIST

## 2020-09-07 PROCEDURE — 97535 SELF CARE MNGMENT TRAINING: CPT

## 2020-09-07 PROCEDURE — 97110 THERAPEUTIC EXERCISES: CPT

## 2020-09-07 RX ADMIN — LEVOTHYROXINE SODIUM 75 MCG: 75 TABLET ORAL at 05:33

## 2020-09-07 RX ADMIN — CITALOPRAM HYDROBROMIDE 20 MG: 20 TABLET ORAL at 08:54

## 2020-09-07 RX ADMIN — CEPHALEXIN 500 MG: 500 CAPSULE ORAL at 08:53

## 2020-09-07 RX ADMIN — APIXABAN 2.5 MG: 2.5 TABLET, FILM COATED ORAL at 08:54

## 2020-09-07 RX ADMIN — SIMETHICONE CHEW TAB 80 MG 80 MG: 80 TABLET ORAL at 11:22

## 2020-09-07 RX ADMIN — MELATONIN TAB 3 MG 3 MG: 3 TAB at 20:49

## 2020-09-07 RX ADMIN — RUXOLITINIB 10 MG: 10 TABLET ORAL at 08:54

## 2020-09-07 RX ADMIN — METOPROLOL TARTRATE 25 MG: 50 TABLET, FILM COATED ORAL at 20:50

## 2020-09-07 RX ADMIN — APIXABAN 2.5 MG: 2.5 TABLET, FILM COATED ORAL at 17:06

## 2020-09-07 RX ADMIN — METOPROLOL TARTRATE 25 MG: 50 TABLET, FILM COATED ORAL at 08:53

## 2020-09-07 NOTE — OCCUPATIONAL THERAPY NOTE
Occupational Therapy Treatment Note     TIME: 10:50-11:44  (54 mins)  VITALS: SPO2 98% RA HR 73 after functional mobility  PAIN: Denies  COGNITION: WFL  PRECAUTIONS: Fall risk, ostomy     EXPIRATION DATE: 9/8/2020  TREATMENT DAY: 9  DISCHARGE RECOMMENDATION: Home with social support  DME NEEDED FOR DISCHARGE: RW  ADDITIONAL COMMENTS: Pt has reacher at home but will need sock aid, dressing stick, and foot funnel for home       SESSION DETAILS / ASSESSMENT:  Pt continues to make gains toward OT goals  Anticipating discharge home tomorrow  Improvement noted in LB dressing, ADL txfs, functional mobility, stand balance, stand tolerance, stall shower txf, and kitchen tasks  Requires increased time to use LB AE  Continues to require verbal cues on how to use the sock aid  However, once instructed, pt able to complete task without assistance  Pt will need AE for home or need assist from son until incision heals and she's able to bend better without pain  Pt has Independent sign and reports has been taking herself to/from bathroom without any issues  Tolerated 10 mins stand tolerance and functional mobility on unit today without rest break  Demonstrated ability to get in/out of stall shower w/ small lip  Discussed functional self care status and pt reports the following: (I) to eat breakfast, (I) to complete oral care (dentures), (I) toileting, (I) bathing, (I) UB dressing, MI LB dressing, and (S) footwear management  Pt doing well and is scheduled for discharge home tomorrow with services and assist from family  Continue OT until discharge to achieve optimal performance   Remains seated in recliner w/ all needs at end of session            Goals STG achieved within 2 wks Performance at Initial Evaluation (08/25) Current Performance (last date completed)   Grooming while standing at sink Lana/I  8/27, 9/1, 9/3  MET    Supervision  9/3- MI to use hand  in stance   ADL transfers  Lana/I  9/1, 9/2, 9/7  CONT     CGA 9/7- STS MI; Ambulation MI using RW  9/4- STS MI w/ poor hand placement         Bathroom mobility  Lana/I  9/2, 9/7  CONT    CGA with RW 9/7- MI using RW  9/3- (S) --> MI using RW; can be unsteady at times during turns      UB ADL  Lana/I  8/26, 9/1, 9/4  MET    Nathan 9/4- MI       LB ADL, AE PRN Lana/I    CONT modA 9/7- MI doff socks using AE; (S) don socks using AE w/ verbal cues for tech; MI don sneakers using AE w/ increased time  9/4- Min bathing; (S) dressing using AE w/ increased time and minimal verbal cues   9/3- Educated on AE; Mod doff/don socks using AE; Min don shoes using AE (see notes)   8/26 min assist with AE ed except socks max assist       Toileting/clothing management and hygiene Lana/I  CONT modA 9/4- (S) but wanted OT to check for cleanliness  9/3- (S) empty ostomy in stance & for CM  8/29 Mod A for posterior thoroughness   8/26 hygiene supervision; CM supervision (min for thoroughness when fatigued)      Dynamic standing balance Fair +   8/29, 9/1, 9/2, 9/4, 9/7  MET Fair - 9/7- F+  9/3- F--> F+   Increase standing tolerance for inc'd safety with standing purposeful tasks > 10 minutes  9/1, 9/7  CONT ~ 4 minutes 9/7- 10 mins during kitchen task and functional mobility  9/2 4-5 min   9/1 10 min with kitchen tasks and mobility  8/29 7 min with toileting and mob   8/27 5 min + SOB    Participate in therex 1-3x/week for inc'd overall stamina/activity tolerance for purposeful tasks To complete  8/27, 9/1, 9/3,9/7  MET   9/7- B/L isometrics x 20 reps w/ 3 sec hold  9/3- 3# x 20 reps for 3 planes; fatigued   9/1 3# and 2# dowel shoulder planes 2x10  8/27 2# weighted ball in all planes with good tolerance for 2x10    Shower stall transfer with grab bar Lana/I  9/1, 9/7  CONT      9/7- MI using L GB  9/1 MI +L GB  8/27 Supervision     Kitchen mobility for inc'd independence and safety negotiating kitchen environment Lana/I  9/1, 9/7  CONT   9/7- MI using RW  9/1 MI with RW  8/27 simulated in room DS +RW     Light meal prep Lana/I  9/7  CONT      9/7- MI to make tea using RW  9/1 SUP with RW  8/27 simulated in room DS + walker tray    Bed mobility with HOB flat  Lana/I  CONT      9/3- (S) w/ increased time & effort   8/27 Supervision     Gaby Jeffries MS, OTR/L

## 2020-09-07 NOTE — PLAN OF CARE
Problem: OCCUPATIONAL THERAPY ADULT  Goal: Performs self-care activities at highest level of function for planned discharge setting  See evaluation for individualized goals  Description: Treatment Interventions: ADL retraining, Functional transfer training, UE strengthening/ROM, Endurance training, Patient/family training, Continued evaluation, Activityengagement, Energy conservation          See flowsheet documentation for full assessment, interventions and recommendations  Outcome: Progressing  Note: Limitation: Decreased ADL status, Decreased UE strength, Decreased Safe judgement during ADL, Decreased endurance, Decreased high-level ADLs  Prognosis: Good    Pt continues to make gains toward OT goals  Anticipating discharge home tomorrow  Improvement noted in LB dressing, ADL txfs, functional mobility, stand balance, stand tolerance, stall shower txf, and kitchen tasks  Requires increased time to use LB AE  Continues to require verbal cues on how to use the sock aid  However, once instructed, pt able to complete task without assistance  Pt will need AE for home or need assist from son until incision heals and she's able to bend better without pain  Pt has Independent sign and reports has been taking herself to/from bathroom without any issues  Tolerated 10 mins stand tolerance and functional mobility on unit today without rest break  Demonstrated ability to get in/out of stall shower w/ small lip  Discussed functional self care status and pt reports the following: (I) to eat breakfast, (I) to complete oral care (dentures), (I) toileting, (I) bathing, (I) UB dressing, MI LB dressing, and (S) footwear management  Pt doing well and is scheduled for discharge home tomorrow with services and assist from family  Continue OT until discharge to achieve optimal performance  Remains seated in recliner w/ all needs at end of session        OT Discharge Recommendation: Return to previous environment with social support

## 2020-09-07 NOTE — PHYSICAL THERAPY NOTE
Pt was seen for 45 min of individual tx      09/07/20 1315   Pain Assessment   Pain Assessment Tool 0-10   Pain Score No Pain   Restrictions/Precautions   Weight Bearing Precautions Per Order No   Other Precautions Fall Risk;Pain   General   Chart Reviewed Yes   Response to Previous Treatment Patient with no complaints from previous session  Family/Caregiver Present No   Cognition   Overall Cognitive Status WFL   Arousal/Participation Alert; Cooperative   Attention Within functional limits   Orientation Level Oriented X4   Memory Within functional limits   Following Commands Follows all commands and directions without difficulty   Subjective   Subjective   (sitting comfortable in a recliner)   Transfers   Sit to Stand 6  Modified independent   Additional items Increased time required   Stand to Sit 6  Modified independent   Additional items Increased time required   Stand pivot 6  Modified independent   Additional items Increased time required   Ambulation/Elevation   Gait pattern Forward Flexion;Decreased foot clearance; Inconsistent wisam; Foward flexed; Excessively slow   Gait Assistance 6  Modified independent   Additional items Verbal cues   Assistive Device Rolling walker   Distance   (130'x2)   Stair Management Assistance 5  Supervision   Additional items   (B hands use on  HR)   Stair Management Technique Step to pattern   Number of Stairs 12   Balance   Static Sitting Good   Dynamic Sitting Good   Static Standing Fair +   Dynamic Standing Fair +   Ambulatory Fair   Endurance Deficit   Endurance Deficit Yes   Activity Tolerance   Activity Tolerance Patient limited by fatigue   Nurse Made Aware   (yes)   Exercises   Heelslides Sitting;20 reps;Bilateral   Hip Flexion Sitting;20 reps;Bilateral   Hip Abduction Sitting;20 reps;Bilateral   Hip Adduction Sitting;20 reps;Bilateral   Knee AROM Long Arc Quad Sitting;20 reps;Bilateral   Ankle Pumps Sitting;20 reps;Bilateral   Marching Sitting;20 reps;Bilateral Assessment   Prognosis Good   Problem List Decreased strength;Decreased range of motion;Decreased endurance; Impaired balance   Assessment   (Jaylon tx well, some fatigue noted, continue to work on POC )   Barriers to Discharge Inaccessible home environment   Goals   Patient Goals   (I am going home on Tuesday  I am ready )   STG Expiration Date 09/08/20   Short Term Goal #1 see grid   PT Treatment Day 11   Plan   Treatment/Interventions Functional transfer training;LE strengthening/ROM; Elevations; Therapeutic exercise; Endurance training;Gait training   Progress Progressing toward goals   PT Frequency   (5-7x week)   Recommendation   PT - OK to Discharge No     Goals STG achieved within 2 weeks Performance at Initial Evaluation (8/25/2020) Last date completed      Bed mobility skills including rolling and repositioning to prevent skin breakdown and decrease caregiver burden            modified independent assistance                    9/2            Supine to sit transitions to increase ease of transfer, allow pt to get out of bed, and decrease caregiver burden     Side Note (effective 8/27/20): Pt uses R side of bed at home          modified independent assistance                  9/4         Sit to supine transitions to increase ease of transfer, allow pt to get back into bed, and decrease caregiver burden          modified independent assistance                       9/4      Functional transfers to facilitate safe return to previous living environment        modified independent assistance         supervision assistance x1    9/7      Ambulation with least restrictive AD; including at least 2 turns for safe household distance ambulation          modified independent assistance            supervision assistance x1       9/7      Ascend/descend a full flight of steps with left handrail, with device prn, for safe access to previous living environment     Side Note (effective 9/2/20): Pt reported her son can Supervise her on the steps       Goal Modified   Effective 9/2/20     Supervision          Not attempted 2* fatigue          9/7      Ascend/descend a curb step, using appropriate AD and method, no handrails, for safe access to previous living environment     Side Note (effective 9/2/20): Pt reported her son can Supervise her on the steps          Goal Modified   Effective 9/2/20    Supervision          Not attempted 2* fatigue          9/4      Improve standing functional balance to allow for pt to  object from floor            modified independent assistance             Not attempted 2* fatigue          9/5

## 2020-09-07 NOTE — PLAN OF CARE
Problem: PHYSICAL THERAPY ADULT  Goal: Performs mobility at highest level of function for planned discharge setting  See evaluation for individualized goals  Description: Treatment/Interventions: Functional transfer training, LE strengthening/ROM, Elevations, Therapeutic exercise, Endurance training, Patient/family training, Equipment eval/education, Bed mobility, Gait training, Spoke to nursing, Spoke to case management  Equipment Recommended: Walker(progress to LRAD)       See flowsheet documentation for full assessment, interventions and recommendations  Outcome: Progressing  Note: Prognosis: Good  Problem List: Decreased strength, Decreased range of motion, Decreased endurance, Impaired balance  Assessment: (Jaylon tx well, some fatigue noted, continue to work on POC )  Barriers to Discharge: Inaccessible home environment     PT Discharge Recommendation: (as per primary PT recomm  )     PT - OK to Discharge: No    See flowsheet documentation for full assessment

## 2020-09-07 NOTE — NURSING NOTE
The nurse charla text Dr Gloria Mitchell rt staples present in the pts abdominal incision  The pts incision is partly dehisced and has scant drainage  Dr Fareed Guzman contacted the unit and stated, "I am aware of the dehiscence of the pts abd incision  I sent her to rehab with areas of the abd wound open'  The nurse told the MD eric the pt is being d/c on 9/8  The MD stated, "Please make an appointment at my office for the pt to have the staples out on Wednesday 9/8"

## 2020-09-08 VITALS
DIASTOLIC BLOOD PRESSURE: 74 MMHG | HEIGHT: 60 IN | BODY MASS INDEX: 35.75 KG/M2 | HEART RATE: 70 BPM | WEIGHT: 182.1 LBS | TEMPERATURE: 97.5 F | OXYGEN SATURATION: 96 % | RESPIRATION RATE: 19 BRPM | SYSTOLIC BLOOD PRESSURE: 137 MMHG

## 2020-09-08 PROCEDURE — 97530 THERAPEUTIC ACTIVITIES: CPT

## 2020-09-08 PROCEDURE — 99315 NF DSCHRG MGMT 30 MIN/LESS: CPT | Performed by: INTERNAL MEDICINE

## 2020-09-08 PROCEDURE — 97535 SELF CARE MNGMENT TRAINING: CPT

## 2020-09-08 PROCEDURE — 97116 GAIT TRAINING THERAPY: CPT

## 2020-09-08 RX ADMIN — LEVOTHYROXINE SODIUM 75 MCG: 75 TABLET ORAL at 05:31

## 2020-09-08 RX ADMIN — APIXABAN 2.5 MG: 2.5 TABLET, FILM COATED ORAL at 08:58

## 2020-09-08 RX ADMIN — RUXOLITINIB 10 MG: 10 TABLET ORAL at 09:07

## 2020-09-08 RX ADMIN — CITALOPRAM HYDROBROMIDE 20 MG: 20 TABLET ORAL at 08:58

## 2020-09-08 RX ADMIN — METOPROLOL TARTRATE 25 MG: 50 TABLET, FILM COATED ORAL at 08:58

## 2020-09-08 NOTE — SOCIAL WORK
Transfer letter provided in discharge folder, copy placed in scan bin  DVAIE delivered RW to patient's room

## 2020-09-08 NOTE — OCCUPATIONAL THERAPY NOTE
Occupational Therapy Treatment Note and Discharge Summary    Carole Callahan    Patient Active Problem List   Diagnosis    Chronic saddle pulmonary embolism (HCC)    Stage 4 chronic [facility] disease ([facility]    Bladder mass    Small bowel obstruction (HCC)    Obstructive uropathy    Secondary hyperparathyroidism of renal origin ([facility] )    Hypothyroidism    Hypertension    Polycythemia vera ([facility]    Fall    Subdural hematoma, acute (HCC)    Intraventricular hemorrhage (HCC)    Diarrhea    Recurrent Clostridium difficile diarrhea    Anemia in CKD (chronic kidney disease)    Mass of urethra    Stage 5 chronic kidney disease not on chronic dialysis ([address] )    Thoracic compression fracture (HCC)    Hematuria    Abnormal finding on MRI of brain    Benign neoplasm of supratentorial region of brain ([address]    Meningioma ([facility]    UTI (urinary tract infection)    Herpes zoster    Inverted T wave    Polycythemia    Encounter for surgical aftercare following surgery on the digestive system    History of Clostridioides difficile colitis    History of shingles    Depression       Past Medical History:   Diagnosis Date    Anxiety     Chronic kidney disease (CKD), stage IV (severe) (HCC)     stage IV    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    Compression fracture of cervical spine (HCC)     Hydronephrosis     Hypertension     Hypothyroid     Incontinence     Lung mass     Improving on PET/CT 1/2016    Polycythemia vera (HCC)     Pulmonary embolism ([facility] ) 2014    Urinary tract infection      TIME: 918-958 40 minutes  VITALS: stable  PAIN: none  COGNITION: WFL  PRECAUTIONS: Fall risk, ostomy    EXPIRATION DATE: 9/8/2020  TREATMENT DAY: 10    ASSESSMENT:    Pt pleasant and cooperative during session  Session focused on UB dressing , LB dressing , bathing, don/doff footwear , functional mobility and functional transfers    At length, discussed progression in OT while on TCF and achievement of POC goals  Pt made significant gains in OT since initial evaluation  Pt limited by weakness, fatigue and endurance   Pt educated on  safe functional transfer techniques with appropriate body mechanics, fall prevention, pain management techniques , the appropriate use of AE/DME to improve functional performance, activity modification techniques for energy conservation, safety with ambulation and in-home navigation addressing environmental barriers and fall risks, the importance of UE strengthening to maximize functional performance when completing ADL tasks and transfers  and safe discharge planning/ safety at home   Pt ready for d/c when medically able  Pt seated in recliner with call bell in reach to conclude session  DISPOSITION: Home with social support and home thetrapy    AE/DME: Recommending dressing stick, foot funnel and sock aid, RW      DISCHARGE SUMMARY: Pt discharged from [address] [place] [address] with [facility] on 9/8/2020 with plans to discharge from hospital this AM around 11AM  Participated in a total of 10/10 OT sessions  Pt achieved 13/13 short term goals and  Deficits remain in weakness, fatigue and endurance   Pt will have home health services  Recommend home therapy focus on the above noted deficits and further achieve independence in ADL related tasks  Pt is safe to return home with social support and home health services  No further concerns noted       Goals STG achieved within 2 wks Performance at Initial Evaluation (08/25) Current Performance (last date completed)   Grooming while standing at sink Lana/I  MET    Supervision  9/3- MI to use hand  in stance   ADL transfers  Lana/I  MET    CGA 9/8 MI  9/7- STS MI; Ambulation MI using RW  9/4- STS MI w/ poor hand placement          Bathroom mobility  Lana/I  MET    CGA with RW 9/8 MI  9/7- MI using RW  9/3- (S) --> MI using RW; can be unsteady at times during turns      UB ADL  Lana/I  MET    Nathan 9/4- MI       LB ADL, AE PRN Lana/I   MET modA 9/8 MI underwear and pants and bathing  9/7- MI doff socks using AE; (S) don socks using AE w/ verbal cues for tech; MI don sneakers using AE w/ increased time  9/4- Min bathing; (S) dressing using AE w/ increased time and minimal verbal cues       Toileting/clothing management and hygiene Lana/I  MET modA 9/8 MI (pt has I sign for toileting)  9/4- (S) but wanted OT to check for cleanliness  9/3- (S) empty ostomy in stance & for CM  8/29 Mod A for posterior thoroughness   8/26 hygiene supervision; CM supervision (min for thoroughness when fatigued)      Dynamic standing balance Fair +  MET Fair - 9/7- F+  9/3- F--> F+   Increase standing tolerance for inc'd safety with standing purposeful tasks > 10 minutes  MET ~ 4 minutes 9/7- 10 mins during kitchen task and functional mobility  9/2 4-5 min   9/1 10 min with kitchen tasks and mobility  8/29 7 min with toileting and mob   8/27 5 min + SOB    Participate in therex 1-3x/week for inc'd overall stamina/activity tolerance for purposeful tasks To complete  MET   9/7- B/L isometrics x 20 reps w/ 3 sec hold  9/3- 3# x 20 reps for 3 planes; fatigued   9/1 3# and 2# dowel shoulder planes 2x10  8/27 2# weighted ball in all planes with good tolerance for 2x10    Shower stall transfer with grab bar Lana/I  MET      9/7- MI using L GB  9/1 MI +L GB  8/27 Supervision     Kitchen mobility for inc'd independence and safety negotiating kitchen environment Lana/I  MET   9/7- MI using RW  9/1 MI with RW  8/27 simulated in room DS +RW     Light meal prep Lana/I  MET        9/7- MI to make tea using RW  9/1 SUP with RW  8/27 simulated in room DS + walker tray    Bed mobility with HOB flat  Lana/I  MET      9/8 MI  9/3- (S) w/ increased time & effort   8/27 Supervision     Brenda Palencia MOT,OTR/L

## 2020-09-08 NOTE — PLAN OF CARE
Problem: PHYSICAL THERAPY ADULT  Goal: Performs mobility at highest level of function for planned discharge setting  See evaluation for individualized goals  Description: Treatment/Interventions: Functional transfer training, LE strengthening/ROM, Elevations, Therapeutic exercise, Endurance training, Patient/family training, Equipment eval/education, Bed mobility, Gait training, Spoke to nursing, Spoke to case management  Equipment Recommended: Walker(progress to LRAD)       See flowsheet documentation for full assessment, interventions and recommendations  Outcome: Adequate for Discharge  Note: Prognosis: Good  Problem List: Decreased strength, Decreased range of motion, Decreased endurance, Impaired balance, Decreased mobility, Decreased coordination, Impaired vision, Obesity, Decreased skin integrity  Assessment: Pt is discharged from skilled PT effective today, 9/8/20  Pt with plans to return to home with her autistic son this morning  Pt reports, son will be available to provide 24/7 Supervision and assist as needed  Since 9/2/20 PT Progress Note, pt was seen for 5 skilled PT tx sessions for therex, theract, and gait/elevation training  Pt with good progress in skilled PT,this past week  Improvement assessed with: functional strength, balance, activity tolerance, safety, bed mobility, transfers, and gait/elevations  All STG's achieved  Please see grid below and flowsheet, for details on pt's functional mobility status at discharge  Barriers to Discharge: Inaccessible home environment     PT Discharge Recommendation: Home with skilled therapy, Return to previous environment with social support(EMILEE (RN,PT,OT))     PT - OK to Discharge: Yes    See flowsheet documentation for full assessment

## 2020-09-08 NOTE — PHYSICAL THERAPY NOTE
Physical Therapy Daily Treatment Note and Discharge Summary       09/08/20: 69 minutes (10:22 to 11:31)      PT Last Visit   PT Visit Date 09/08/20   Pain Assessment   Pain Assessment Tool Pain Assessment not indicated - pt denies pain   Restrictions/Precautions   Weight Bearing Precautions Per Order No   Other Precautions Visual impairment; Fall Risk   General   Chart Reviewed Yes   Response to Previous Treatment Patient with no complaints from previous session     Family/Caregiver Present No   Cognition   Overall Cognitive Status WFL   Arousal/Participation Cooperative   Attention Within functional limits   Orientation Level Oriented X4   Memory Within functional limits   Following Commands Follows all commands and directions without difficulty   Comments   (Flat affect)   Bed Mobility   Rolling R 6  Modified independent   Additional items Increased time required   Rolling L 6  Modified independent   Additional items Increased time required   Supine to Sit 6  Modified independent   Additional items Increased time required  (Increased effort, due "bruise on left buttocks")   Sit to Supine 6  Modified independent   Additional items Increased time required   Additional Comments Bed Mobility: HOB flat, no rail   Transfers   Sit to Stand 6  Modified independent   Additional items Increased time required  (Good hand placement)   Stand to Sit 6  Modified independent   Additional items Increased time required  (Good hand placement; + controlled descent)   Stand pivot 6  Modified independent  (SPT with RW)   Additional items Increased time required  (Occ abandons RW; but still appears steady)   Toilet transfer 6  Modified independent  (Sit <->stand and SPT with RW)   Additional items Increased time required;Standard toilet  (Good RW management; I toileting)   Car transfer 6  Modified independent  (Removed foot board on bed to simulate car transfer)   Additional items Increased time required  (Managed BLE's over simulated car wall)   Additional Comments Transfers: EOB, recliner, toilet  (Cont: arm chair, unsecured chair)   Ambulation/Elevation   Gait pattern Decreased foot clearance; Forward Flexion; Inconsistent wisam   Gait Assistance 6  Modified independent   Additional items   (Pt is safe and steady)   Assistive Device Rolling walker   Distance 253 feet, 25 feet x 2, 8 feet x 2  (Negotiated 5 turns/obstacles during 253 feet gait trial)    Vitals-> after ambulating with a RW for 253 feet (seated): /69, HR 72, SPO2 (RA) 93%      Stair Management Assistance Not tested  (Pt declined to perform; "to save energy" for discharge home today )   Curbs 1 + 1 curb steps with RW and MOD I -> good RW management1   (Appears safe and steady)   Balance   Static Sitting   (Good+)   Dynamic Sitting Good   Static Standing   (Good (-) with RW)   Dynamic Standing Fair +  (With RW: picked tissue off fl w/ reacher, then w/ hand CATIE)   Ambulatory   (Fair + with RW)   Higher level balance Side stepping   Higher level balance rep range to the L and to the R  (Fair + with RW)   Endurance Deficit   Endurance Deficit Yes  (Provided with monitored rest breaks prn, between activities)   Activity Tolerance   Activity Tolerance Patient tolerated treatment well;Patient limited by fatigue   Assessment   Prognosis Good   Problem List Decreased strength;Decreased range of motion;Decreased endurance; Impaired balance;Decreased mobility; Decreased coordination; Impaired vision;Obesity; Decreased skin integrity   Assessment Pt is discharged from skilled PT effective today, 9/8/20  Pt with plans to return to home with her autistic son this morning  Pt reports, son will be available to provide 24/7 Supervision and assist as needed  Since 9/2/20 PT Progress Note, pt was seen for 5 skilled PT tx sessions for therex, theract, and gait/elevation training  Pt with good progress in skilled PT,this past week   Improvement assessed with: functional strength, balance, activity tolerance, safety, bed mobility, transfers, and gait/elevations  All STG's achieved  Please see grid below and flowsheet, for details on pt's functional mobility status at discharge  Goals   Patient Goals   (" where I left off, before this nonsense")   STG Expiration Date 09/08/20   Short Term Goal #1 See grid   PT Treatment Day 12   Plan   Treatment/Interventions ADL retraining;Functional transfer training;LE strengthening/ROM; Elevations; Therapeutic exercise; Endurance training;Cognitive reorientation;Patient/family training;Equipment eval/education; Bed mobility;Gait training; Compensatory technique education;Continued evaluation;Spoke to MD;Spoke to nursing;Spoke to case management;Spoke to advanced practitioner;OT;Family   Progress Discontinue PT   PT Frequency   (5 to 7x/week)   Recommendation   PT Discharge Recommendation Home with skilled therapy; Return to previous environment with social support  (Papi Torres (RN,PT,OT))   Equipment Recommended Sanchez Cranker; Other (Comment)  (RW delivered to room and sized to fit )   PT - OK to Discharge Yes       Goals STG achieved within 2 weeks Performance at Initial Evaluation (8/25/2020) Last date completed      Bed mobility skills including rolling and repositioning to prevent skin breakdown and decrease caregiver burden            modified independent assistance     ACHIEVED  (HOB flat, no rail)               9/8            Supine to sit transitions to increase ease of transfer, allow pt to get out of bed, and decrease caregiver burden     Side Note (effective 8/27/20): Pt uses R side of bed at home          modified independent assistance     ACHIEVED  (HOB flat, no rail)               9/8       Sit to supine transitions to increase ease of transfer, allow pt to get back into bed, and decrease caregiver burden          modified independent assistance     ACHIEVED  (HOB flat, no rail)                  9/8      Functional transfers to facilitate safe return to previous living environment        modified independent assistance  ACHIEVED          supervision assistance x1    9/8      Ambulation with least restrictive AD; including at least 2 turns for safe household distance ambulation          modified independent assistance   ACHIEVED             supervision assistance x1       9/8      Ascend/descend a full flight of steps with left handrail, with device prn, for safe access to previous living environment     Side Note (effective 9/2/20): Pt reported her son can Supervise her on the steps       Goal Modified   Effective 9/2/20     Supervision    ACHIEVED        Not attempted 2* fatigue         9/7  Pt negotiated 12 steps with bilateral hands on left handrail up with Supervision      Ascend/descend a curb step, using appropriate AD and method, no handrails, for safe access to previous living environment     Side Note (effective 9/2/20): Pt reported her son can Supervise her on the steps          Goal Modified   Effective 9/2/20    Supervision    ACHIEVED          Not attempted 2* fatigue          9/8      Improve standing functional balance to allow for pt to  object from floor            modified independent assistance  ACHIEVED  (with reacher and with no AD)             Not attempted 2* fatigue          9/2

## 2020-09-08 NOTE — DISCHARGE SUMMARY
Discharge- Carole Callahan [birth year], 77 y o  female MRN: [id number]    Unit/Bed#: -01 Encounter: [id number]    Primary Care Provider: Moriah Cm MD   Date and time admitted to hospital: 8/24/2020  7:13 PM        * Encounter for surgical aftercare following surgery on the digestive system  Assessment & Plan  Patient had prolonged hospitalization for 2 weeks  She was admitted and [facility] from August 10 through August 24th for incarcerated peristomal hernia at the ileal conduct side  She is status post ex laparotomy and kirt stomal hernia repair with mesh and lysis of adhesions  He had postoperative difficulty with bowel function requiring bowel rest and NG tube placement which is resolved  IR placed IJ line due to difficulty accessing peripheral IV  Following the surgery patient was found to be severely deconditioned with inability to resume activity of daily living due to weakness and decreased endurance  Therefore recommended to be admitted at rehab for further strengthening  Progressing with physical therapy and occupational therapy  Polycythemia vera ([address] )  Assessment & Plan  · Follow-up with heme oncology  · Has been on Jakafi 10 mg daily will continue -patient brought from home    Hypertension  Assessment & Plan  Continue metoprolol tartrate  Was on nifedipine will resume if blood pressure above 130/80 given CKD and recent LUANN  Hypothyroidism  Assessment & Plan  Continue levothyroxine 75 mcg daily    Obstructive uropathy  Assessment & Plan  · This is a chronic problem  She follow-up with urology 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 elevated bladder pressure leading to hydronephrosis and CKD  · Last seen urology July 2019 was scheduled for yearly follow-up      Stage 4 chronic kidney disease (HCC)  Assessment & Plan  · Creatinine baseline 2-3 currently within baseline  · She has functional solitary right kidney with chronic 8 mm stone in the lower pole  · Outpatient follow-up with Nephrology September 24 20   · Intermittent IV fluids for worsening renal function  · Aggressively treat any evidence urinary tract infection given solitary right function kidney  Chronic saddle pulmonary embolism (HCC)  Assessment & Plan  · Continue home dose Eliquis 2 5 mg b i d  Unclear why reduce does given her age and weight does not meet criteria  · She does have history of subdural hematoma in the past  · Patient follow-up with heme oncology given history of polycythemia vera  History of shingles  Assessment & Plan  · About 2 weeks ago she had dermatomal skin lesion consistent with shingles on the right gluteus region lesions are crusted no longer need isolation  There is no post herpetic neuralgia  History of Clostridioides difficile colitis  Assessment & Plan  · Last infection 2019  · Treated with vancomycin prophylaxis while on Keflex in recent hospitalization  · Completed prophylaxis vancomycin 48 hours following cessation of antibiotic    Anemia in CKD (chronic kidney disease)  Assessment & Plan  · Hemoglobin baseline 9-10 most recent hemoglobin prior to hospital discharge 7 4  · Hemoglobin 6 6 symptomatic with lightheadedness  · Iron panel borderline for iron deficiency  · Type and screen  · Transfused 2 units of packed red blood cells 1 unit 08/28/2020 at the infusion center and 1 unit on 08/31/2020 discussed with infusion center per scheduling  · No clear source of bleeding hemoglobin following transfusion 7  · UA microscopic hematuria with UTI treated with Keflex  · Check fecal occult blood was negative      Depression  Assessment & Plan  Continue citalopram 20 mg daily      Discharging Physician / Practitioner: Hunter Trevino MD  PCP: Renu Perez MD  Admission Date:   Admission Orders (From admission, onward)     Ordered        09/08/20 1136  Admit Patient to  Once                   Discharge Date: 09/08/20    Resolved Problems  Date Reviewed: 9/8/2020    None          Consultations During Hospital Stay:  · None  Procedures Performed:   · None  Significant Findings / Test Results:   · None  Incidental Findings:   · None  Test Results Pending at Discharge (will require follow up): · None  Outpatient Tests Requested:  · None  Complications:  None  Reason for Admission:  Generalized weakness    Hospital Course:     Inocencio Hayes is a 77 y o  female patient who originally presented to the hospital on 8/24/2020 due to generalized weakness and dizziness following recent hospitalization at [facility] for abdominal pain found to have incarcerated bowel obstruction underwent exploratory laparotomy with hernia repair and adhesion lysis on 08/10/2020  After tolerating oral diet patient was noted to be weak, has decrease in drains, poor balance and recommended to be treated at a skilled nursing facility with rehab  While in rehab she was noted to feel dizzy her CBC revealed hemoglobin around 6 therefore received 2 units of blood transfusion and dizziness resolved  She also completed prophylactic vancomycin given history of Clostridium difficile infection after being treated with Keflex for UTI  Patient improved significantly with PT/OT and recommended to be discharged with home skilled therapy and VNA  Her other medical problems include pulmonary in place on Eliquis, polycythemia vera on tyrosine kinase inhibitor, CKD stage 4, depression on Lexapro, obstructive uropathy status post urinary diversion with ostomy, hypertension, hypothyroidism, shingles and Clostridium difficile infection  Please see above list of diagnoses and related plan for additional information  Condition at Discharge: good     Discharge Day Visit / Exam:     Subjective:      Patient seen and examined at bedside  She offered no complaint  She was very thankful        Vitals: Blood Pressure: 137/74 (09/08/20 0706)  Pulse: 70 (09/08/20 0706)  Temperature: 97 5 °F (36 4 °C) (09/08/20 0706)  Temp Source: Temporal (09/08/20 0706)  Respirations: 19 (09/08/20 0706)  Height: 5' (152 4 cm) (08/25/20 1434)  Weight - Scale: 82 6 kg (182 lb 1 6 oz) (09/02/20 0815)  SpO2: 96 % (09/08/20 0706)  Exam:   Physical Exam  Vitals signs reviewed  Constitutional:       General: She is not in acute distress  Appearance: Normal appearance  She is obese  She is not ill-appearing, toxic-appearing or diaphoretic  HENT:      Head: Normocephalic and atraumatic  Nose: No rhinorrhea  Mouth/Throat:      Pharynx: No oropharyngeal exudate or posterior oropharyngeal erythema  Eyes:      General:         Right eye: No discharge  Left eye: No discharge  Cardiovascular:      Rate and Rhythm: Normal rate and regular rhythm  Heart sounds: Normal heart sounds  No murmur  Pulmonary:      Effort: Pulmonary effort is normal  No respiratory distress  Breath sounds: Normal breath sounds  No wheezing, rhonchi or rales  Abdominal:      General: Bowel sounds are normal  There is no distension  Palpations: Abdomen is soft  Tenderness: There is no abdominal tenderness  Comments: Urinary diversion ostomy with yellowish urine   Musculoskeletal:      Right lower leg: No edema  Left lower leg: No edema  Neurological:      General: No focal deficit present  Mental Status: She is alert and oriented to person, place, and time  Comments: Ambulate with a walker patient   Psychiatric:         Mood and Affect: Mood normal          Behavior: Behavior normal        Discussion with Family:  Discussed with patient  Discharge instructions/Information to patient and family:   See after visit summary for information provided to patient and family  Provisions for Follow-Up Care:  See after visit summary for information related to follow-up care and any pertinent home health orders  Disposition:     Home with VNA Services (Reminder: Complete face to face encounter)    For Discharges to [facility] SNF:   · Not Applicable to this Patient - Not Applicable to this Patient    Planned Readmission:  No      Discharge Statement:  I spent 20 minutes discharging the patient  This time was spent on the day of discharge  I had direct contact with the patient on the day of discharge  Greater than 50% of the total time was spent examining patient, answering all patient questions, arranging and discussing plan of care with patient as well as directly providing post-discharge instructions  Additional time then spent on discharge activities  Discharge Medications:  See after visit summary for reconciled discharge medications provided to patient and family        ** Please Note: This note has been constructed using a voice recognition system **

## 2020-09-08 NOTE — ASSESSMENT & PLAN NOTE
· Hemoglobin baseline 9-10 most recent hemoglobin prior to hospital discharge 7 4  · Hemoglobin 6 6 symptomatic with lightheadedness  · Iron panel borderline for iron deficiency  · Type and screen  · Transfused 2 units of packed red blood cells 1 unit 08/28/2020 at the infusion center and 1 unit on 08/31/2020 discussed with infusion center per scheduling  · No clear source of bleeding hemoglobin following transfusion 7  · UA microscopic hematuria with UTI treated with Keflex  · Check fecal occult blood was negative

## 2020-09-08 NOTE — ASSESSMENT & PLAN NOTE
Patient had prolonged hospitalization for 2 weeks  She was admitted and [facility] from August 10 through August 24th for incarcerated peristomal hernia at the ileal conduct side  She is status post ex laparotomy and kirt stomal hernia repair with mesh and lysis of adhesions  He had postoperative difficulty with bowel function requiring bowel rest and NG tube placement which is resolved  IR placed IJ line due to difficulty accessing peripheral IV  Following the surgery patient was found to be severely deconditioned with inability to resume activity of daily living due to weakness and decreased endurance  Therefore recommended to be admitted at rehab for further strengthening  Progressing with physical therapy and occupational therapy

## 2020-09-08 NOTE — PLAN OF CARE
Problem: OCCUPATIONAL THERAPY ADULT  Goal: Performs self-care activities at highest level of function for planned discharge setting  See evaluation for individualized goals  Description: Treatment Interventions: ADL retraining, Functional transfer training, UE strengthening/ROM, Endurance training, Patient/family training, Continued evaluation, Activityengagement, Energy conservation          See flowsheet documentation for full assessment, interventions and recommendations     Outcome: Adequate for Discharge  Note: Limitation: Decreased ADL status, Decreased UE strength, Decreased Safe judgement during ADL, Decreased endurance, Decreased high-level ADLs  Prognosis: Good  Assessment: see note      OT Discharge Recommendation: Return to previous environment with social support

## 2020-09-10 ENCOUNTER — TELEPHONE (OUTPATIENT)
Dept: HEMATOLOGY ONCOLOGY | Facility: CLINIC | Age: 75
End: 2020-09-10

## 2020-09-10 DIAGNOSIS — D45 POLYCYTHEMIA VERA (HCC): ICD-10-CM

## 2020-09-10 RX ORDER — RUXOLITINIB 10 MG/1
TABLET ORAL
Qty: 30 TABLET | Refills: 0 | Status: SHIPPED | OUTPATIENT
Start: 2020-09-10 | End: 2020-10-05 | Stop reason: SDUPTHER

## 2020-09-10 NOTE — TELEPHONE ENCOUNTER
Patient called and advised that Dr Patricia Diaz escribed her Jakafi prescription to [address] today at 8:28 AM   Patient verbalized she would reach out to [address] [place] [address] to set up delivery  Advised patient to call back if she had any issues  Patient verbalized understanding of above

## 2020-09-11 ENCOUNTER — OFFICE VISIT (OUTPATIENT)
Dept: SURGERY | Facility: CLINIC | Age: 75
End: 2020-09-11

## 2020-09-11 VITALS — TEMPERATURE: 97.1 F | HEIGHT: 60 IN | BODY MASS INDEX: 37.69 KG/M2 | WEIGHT: 192 LBS | HEART RATE: 63 BPM

## 2020-09-11 DIAGNOSIS — K56.609 SMALL BOWEL OBSTRUCTION (HCC): Primary | ICD-10-CM

## 2020-09-11 PROCEDURE — 99024 POSTOP FOLLOW-UP VISIT: CPT | Performed by: SURGERY

## 2020-09-11 NOTE — PROGRESS NOTES
Office Visit - Gary CHANG 26 MRN: [id number]  Encounter: [id number]    Assessment/Plan:   Gracie Esquivel is a 77 y  o female who comes in today for postoperative check after : Ex-lap and parastomal hernia repair with mesh for a SBO incarcerated in the parastomal hernia  - Continue packing 0 5 x 0 5cm open area of midline wound with plain packing until closed  Pathology: None    Postoperative restrictions reviewed, including specific lifting and exercise restrictions  All questions answered  ______________________________________________________  HPI:  Gracie Esquivel is a 77 y  o female who comes in today for postoperative check after recent Ex-lap and parastomal hernia repair with mesh    Currently doing well without problems, no fever, no fever or chills,no nausea and no vomiting  She was recently discharged from rehab on 9/8/20    Reports Tolerating her diet, moving her bowels normally  She is living at home with her grandson to help her  Additionally a visiting nursing has been coming for wound care  ROS:  General ROS: negative for - chills, fatigue, fever or night sweats, weight loss  Respiratory ROS: no cough, shortness of breath, or wheezing  Cardiovascular ROS: no chest pain or dyspnea on exertion  Genito-Urinary ROS: no dysuria, trouble voiding, or hematuria  Musculoskeletal ROS: negative for - gait disturbance, joint pain or muscle pain  Neurological ROS: no TIA or stroke symptoms  GI ROS: see HPI  Skin ROS: no new rashes or lesions   Lymphatic ROS: no new adenopathy noted by pt     GYN ROS: see HPI, no new GYN history or bleeding noted  Psy ROS: no new mental or behavioral disturbances       Patient Active Problem List   Diagnosis    Chronic saddle pulmonary embolism (HCC)    Stage 4 chronic kidney disease (HCC)    Bladder mass    Small bowel obstruction (HCC)    Obstructive uropathy    Secondary hyperparathyroidism of renal origin ([facility] [address] [facility]    Hypothyroidism  Hypertension    [facility]    Fall    Subdural hematoma, acute (HCC)    Intraventricular hemorrhage (HCC)    Diarrhea    Recurrent Clostridium difficile diarrhea    Anemia in CKD (chronic kidney disease)    Mass of urethra    Stage 5 chronic kidney disease not on chronic dialysis [facility]    Thoracic compression fracture (HCC)    Hematuria    Abnormal finding on MRI of brain    Benign neoplasm of supratentorial region of brain ([facility] [address] [facility]    Meningioma [facility]    UTI (urinary tract infection)    Herpes zoster    Inverted T wave    Polycythemia    Encounter for surgical aftercare following surgery on the digestive system    History of Clostridioides difficile colitis    History of shingles    Depression         Chlorhexidine      Current Outpatient Medications:     acetaminophen (TYLENOL) 325 mg tablet, 650 mg every 6 hours as needed for mild pain or headache (Patient taking differently: as needed 650 mg every 6 hours as needed for mild pain or headache), Disp: 30 tablet, Rfl: 0    calcitriol (ROCALTROL) 0 25 mcg capsule, TAKE 1 CAPSULE BY MOUTH EVERY OTHER DAY , Disp: 15 capsule, Rfl: 5    Cholecalciferol (VITAMIN D3) 1000 UNITS CAPS, Take 1,000 unit marking on U-100 syringe by mouth daily  , Disp: , Rfl:     citalopram (CeleXA) 20 mg tablet, Take 20 mg by mouth daily , Disp: , Rfl:     Eliquis 2 5 MG, TAKE 1 TABLET BY MOUTH TWICE A DAY, Disp: 60 tablet, Rfl: 5    Jakafi 10 MG tablet, TAKE ONE TABLET (10MG) BY MOUTH ONE TIME DAILY, Disp: 30 tablet, Rfl: 0    levothyroxine 75 mcg tablet, Take 75 mcg by mouth daily, Disp: , Rfl: 3    loperamide (IMODIUM) 2 mg capsule, Take 1 capsule (2 mg total) by mouth 4 (four) times a day as needed for diarrhea, Disp: 12 capsule, Rfl: 0    melatonin 3 mg, Take 1 tablet (3 mg total) by mouth daily at bedtime, Disp: 30 tablet, Rfl: 0    metoprolol tartrate (LOPRESSOR) 25 mg tablet, Take 1 tablet (25 mg total) by mouth 2 (two) times a day, Disp: 60 tablet, Rfl: 0    NIFEdipine (ADALAT CC) 30 MG 24 hr tablet, Take 1 tablet (30 mg total) by mouth daily, Disp: 30 tablet, Rfl: 0    saccharomyces boulardii (FLORASTOR) 250 mg capsule, Take 1 capsule by mouth daily  , Disp: , Rfl:     WELCHOL 625 MG tablet, as needed , Disp: , Rfl: 2    Past Medical History:   Diagnosis Date    Anxiety     Chronic kidney disease (CKD), stage IV (severe) (HCC)     stage IV    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    Compression fracture of cervical spine [facility]     Hydronephrosis     Hypertension     Hypothyroid     Incontinence     Lung mass     Improving on PET/CT 1/2016    Polycythemia vera ([facility]     Pulmonary embolism ([facility] [address] ) 2014    Urinary tract infection        Past Surgical History:   Procedure Laterality Date    ABDOMINAL ADHESION SURGERY N/A 8/9/2020    Procedure: LYSIS ADHESIONS;  Surgeon: Charo Vargas DO;  [facility];  Service: General    BLADDER SUSPENSION      BOTOX INJECTION N/A 7/27/2016    Procedure: BOTOX INJECTION ;  Surgeon: Marta Gracia MD;  [facility];  Service:     CHOLECYSTECTOMY N/A     COLONOSCOPY      CYSTECTOMY, RADICAL WITH ILEOCONDUIT N/A 10/4/2016    Procedure: SUPRATRIGONAL CYSTECTOMY WITH ILEAL CONDUIT ;  Surgeon: Marta Gracia MD;  [facility];  Service:    Celester Dago W/ RETROGRADES Bilateral 7/27/2016    Procedure: Levi Moore; RETROGRADE PYELOGRAM ;  Surgeon: Marta Gracia MD;  [facility];  Service:     HERNIA REPAIR      IR NON-TUNNELED CENTRAL LINE PLACEMENT  7/17/2020    IR NON-TUNNELED CENTRAL LINE PLACEMENT  8/14/2020    LAPAROTOMY N/A 8/9/2020    Procedure: LAPAROTOMY EXPLORATORY, PARASTOMAL HERNIA REPAIR WITH MESH;  Surgeon: Charo Vargas DO;  [facility];  Service: General    DC COLONOSCOPY FLX DX W/COLLJ SPEC WHEN PFRMD N/A 8/31/2016    Procedure: COLONOSCOPY;  Surgeon: Willard Ulrich MD;  [facility];   Service: Gastroenterology  PA CYSTOSCOPY,INSERT URETERAL STENT Bilateral 7/27/2016    Procedure: STENT INSERTION; EXCISION OF MESH ;  Surgeon: Chaya Cuenca MD;  [facility];  Service: Urology    TONSILLECTOMY      TUBAL LIGATION      WISDOM TOOTH EXTRACTION         Family History   Problem Relation Age of Onset    Cancer Mother         small cell cancer     Heart disease Father     COPD Father     Heart disease Brother     Nephrolithiasis Brother         reports that she has never smoked  She has never used smokeless tobacco  She reports previous alcohol use  She reports previous drug use  Vitals:    09/11/20 0846   Pulse: 63   Temp: (!) 97 1 °F (36 2 °C)       PHYSICAL EXAM  General: normal, cooperative, no distress  Abdominal: soft, nondistended, nontender or Midline incision well healed  Incision: clean, dry, and intact, healing well and  Staples removed  Small 0 5 x 0 5cm area of opening in the mid portion of her incision   Packed with plain packign juli Quispe MD    Date: 9/11/2020 Time: 9:56 AM

## 2020-09-22 ENCOUNTER — LAB REQUISITION (OUTPATIENT)
Dept: LAB | Facility: HOSPITAL | Age: 75
End: 2020-09-22
Payer: COMMERCIAL

## 2020-09-22 DIAGNOSIS — N18.5 CHRONIC KIDNEY DISEASE, STAGE 5 (HCC): ICD-10-CM

## 2020-09-22 LAB
ALBUMIN SERPL BCP-MCNC: 3.2 G/DL (ref 3.5–5)
ANION GAP SERPL CALCULATED.3IONS-SCNC: 10 MMOL/L (ref 4–13)
BUN SERPL-MCNC: 43 MG/DL (ref 5–25)
CALCIUM ALBUM COR SERPL-MCNC: 9.2 MG/DL (ref 8.3–10.1)
CALCIUM SERPL-MCNC: 8.6 MG/DL (ref 8.3–10.1)
CHLORIDE SERPL-SCNC: 111 MMOL/L (ref 100–108)
CO2 SERPL-SCNC: 15 MMOL/L (ref 21–32)
CREAT SERPL-MCNC: 3.72 MG/DL (ref 0.6–1.3)
ERYTHROCYTE [DISTWIDTH] IN BLOOD BY AUTOMATED COUNT: 15.4 % (ref 11.6–15.1)
GFR SERPL CREATININE-BSD FRML MDRD: 11 ML/MIN/1.73SQ M
GLUCOSE SERPL-MCNC: 82 MG/DL (ref 65–140)
HCT VFR BLD AUTO: 26.9 % (ref 34.8–46.1)
HGB BLD-MCNC: 8.5 G/DL (ref 11.5–15.4)
MCH RBC QN AUTO: 29.9 PG (ref 26.8–34.3)
MCHC RBC AUTO-ENTMCNC: 31.6 G/DL (ref 31.4–37.4)
MCV RBC AUTO: 95 FL (ref 82–98)
PHOSPHATE SERPL-MCNC: 3.4 MG/DL (ref 2.3–4.1)
PLATELET # BLD AUTO: 327 THOUSANDS/UL (ref 149–390)
PMV BLD AUTO: 11.2 FL (ref 8.9–12.7)
POTASSIUM SERPL-SCNC: 3.7 MMOL/L (ref 3.5–5.3)
PTH-INTACT SERPL-MCNC: 228.3 PG/ML (ref 18.4–80.1)
RBC # BLD AUTO: 2.84 MILLION/UL (ref 3.81–5.12)
SODIUM SERPL-SCNC: 136 MMOL/L (ref 136–145)
WBC # BLD AUTO: 4.61 THOUSAND/UL (ref 4.31–10.16)

## 2020-09-22 PROCEDURE — 85027 COMPLETE CBC AUTOMATED: CPT | Performed by: INTERNAL MEDICINE

## 2020-09-22 PROCEDURE — 80069 RENAL FUNCTION PANEL: CPT | Performed by: INTERNAL MEDICINE

## 2020-09-22 PROCEDURE — 83970 ASSAY OF PARATHORMONE: CPT | Performed by: INTERNAL MEDICINE

## 2020-09-24 ENCOUNTER — OFFICE VISIT (OUTPATIENT)
Dept: NEPHROLOGY | Facility: CLINIC | Age: 75
End: 2020-09-24
Payer: COMMERCIAL

## 2020-09-24 VITALS
HEART RATE: 74 BPM | SYSTOLIC BLOOD PRESSURE: 128 MMHG | TEMPERATURE: 96.9 F | DIASTOLIC BLOOD PRESSURE: 78 MMHG | HEIGHT: 60 IN | WEIGHT: 188.8 LBS | BODY MASS INDEX: 37.07 KG/M2

## 2020-09-24 DIAGNOSIS — K56.609 SMALL BOWEL OBSTRUCTION (HCC): ICD-10-CM

## 2020-09-24 DIAGNOSIS — D63.1 ANEMIA IN STAGE 4 CHRONIC KIDNEY DISEASE (HCC): ICD-10-CM

## 2020-09-24 DIAGNOSIS — N18.4 ANEMIA IN STAGE 4 CHRONIC KIDNEY DISEASE (HCC): ICD-10-CM

## 2020-09-24 DIAGNOSIS — N25.81 SECONDARY HYPERPARATHYROIDISM OF RENAL ORIGIN (HCC): ICD-10-CM

## 2020-09-24 DIAGNOSIS — N13.9 OBSTRUCTIVE UROPATHY: ICD-10-CM

## 2020-09-24 DIAGNOSIS — N18.5 STAGE 5 CHRONIC KIDNEY DISEASE NOT ON CHRONIC DIALYSIS (HCC): Primary | ICD-10-CM

## 2020-09-24 DIAGNOSIS — I10 ESSENTIAL HYPERTENSION: ICD-10-CM

## 2020-09-24 PROCEDURE — 99214 OFFICE O/P EST MOD 30 MIN: CPT | Performed by: INTERNAL MEDICINE

## 2020-09-24 NOTE — PATIENT INSTRUCTIONS
Given that your kidney function is worsening, I would like you to have repeat blood test in 7-10 days  Increase fluid intake  Follow low-salt diet  I want you to see vascular surgeon for dialysis access evaluation and placement  Would like to see you back in the office in 6-8 weeks  If you develop any worsening nausea, decreased appetite, increased fluid retention, decreased urine output, shortness of breath, please go to the emergency department for urgent evaluation

## 2020-09-24 NOTE — PROGRESS NOTES
OFFICE FOLLOW UP - Nephrology   Shyann Camacho 77 y o  female MRN: [id number]       ASSESSMENT and PLAN:  [place] was seen today for chronic kidney disease and follow-up  Diagnoses and all orders for this visit:    Stage 5 chronic kidney disease not on chronic dialysis ([address] )  -     CBC; Future  -     Renal function panel; Future  -     VAS vessel mapping for hemodialysis upper; Future  -     Ambulatory referral to Vascular Surgery; Future    Secondary hyperparathyroidism of renal origin ([facility] [address] )    Anemia in stage 4 chronic kidney disease (HCC)    Obstructive uropathy    Small bowel obstruction (HCC)    Essential hypertension        This is a 21-year-old lady with history of chronic kidney disease stage 5 with previous creatinine in the high 2 the low 3s, history of urinary incontinence, bilateral hydronephrosis secondary to obstructive uropathy and 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 in October 2016, recent hospitalization with incarcerated ileal conduit parastomal hernia status post ex lap parastomal hernia repair as well as severe acute kidney injury on top of CKD with creatinine on admission on the 5s, kidney function improved and went down to the low threes, today returns to the office for follow-up  1  Chronic kidney disease stage 5  In the setting of obstructive uropathy, functional solitary right kidney, recent episode of acute kidney injury  Previous creatinine in the high 2s to low 3s  Discussed with patient about her worsening kidney function, most recent creatinine up to 3 7  She is currently feeling more fatigued with decreased appetite  We discussed about options, she still interesting on dialysis when needed  I placed a referral to vascular surgery for dialysis access placement and vein mapping  I would like to follow repeat blood test in 2 weeks    I would like to see her back in the office in 6 weeks encouraged to increase fluid intake  She went to [facility] [employer] classes in the past       2  Hypertension, blood pressure acceptable, follow low-salt diet, no changes on medication at this moment  3  History of nonfunctional bladder causing bilateral hydronephrosis status post supra trigonal cystectomy and ileal conduit 10/2016, continue follow-up with urologist     4  Polycythemia area, previous history of PE, on Eliquis, continue follow-up with Hematology follow-up, Dr Dede Velarde  Anemia chronic kidney disease  Most recent hemoglobin 8 5, will follow CBC in 2 weeks  5  Mineral bone disease, PTH elevated but acceptable for his degree of kidney dysfunction, continue with Calcitrol 1 tablet 3 times a week (Monday, Wednesday, Friday)       Patient Instructions   Given that your kidney function is worsening, I would like you to have repeat blood test in 7-10 days  Increase fluid intake  Follow low-salt diet  I want you to see vascular surgeon for dialysis access evaluation and placement  Would like to see you back in the office in 6-8 weeks  If you develop any worsening nausea, decreased appetite, increased fluid retention, decreased urine output, shortness of breath, please go to the emergency department for urgent evaluation  HPI: Justino Tenorio is a 77 y o  female who is here for Chronic Kidney Disease and Follow-up    Hospitalized in 10/2018 due to a small bowel obstruction that resolved spontaneously  Hospitalized early 01/2019 status post fall, found to have a left-sided subdural hematoma, creatinine on admission 3 0 that decreased to 2 5 after intravenously fluids  Hospitalized 05/2019 after status post fall complicated with T-spine compression fracture, found to have sepsis secondary to UTI, also developed acute kidney injury on top of CKD, creatinine on admission was 3 68, renal function improved and creatinine went down to 2 59 on discharge day  Last time seen in our office was on 06/24/2020 by our physician assistant    Noted she was recently hospitalized last month with severe acute kidney injury in the setting of incarcerated ileal conduit parastomal hernia status post ex lap and parastomal hernia repair  Renal function improved with creatinine down to the low 3s  Patient returns today to the office for hospital follow-up  In general she is feeling tired, poor appetite, denies any nausea, no vomiting, no chest pain, no shortness of breath, no significant lower extremity swelling  Continue with loose stools  Denies any abdominal pain  Noticed ileal conduit low urine output is slightly decreased lately  Denies any NSAID use  ROS: All the systems were reviewed and were negative except as documented on the H&P  Allergies: Chlorhexidine    Medications:   Current Outpatient Medications:     acetaminophen (TYLENOL) 325 mg tablet, 650 mg every 6 hours as needed for mild pain or headache (Patient taking differently: as needed 650 mg every 6 hours as needed for mild pain or headache), Disp: 30 tablet, Rfl: 0    calcitriol (ROCALTROL) 0 25 mcg capsule, TAKE 1 CAPSULE BY MOUTH EVERY OTHER DAY  (Patient taking differently: Pt is taking M-W-F), Disp: 15 capsule, Rfl: 5    Cholecalciferol (VITAMIN D3) 1000 UNITS CAPS, Take 1,000 unit marking on U-100 syringe by mouth daily  , Disp: , Rfl:     citalopram (CeleXA) 20 mg tablet, Take 20 mg by mouth daily , Disp: , Rfl:     Eliquis 2 5 MG, TAKE 1 TABLET BY MOUTH TWICE A DAY, Disp: 60 tablet, Rfl: 5    Jakafi 10 MG tablet, TAKE ONE TABLET (10MG) BY MOUTH ONE TIME DAILY, Disp: 30 tablet, Rfl: 0    levothyroxine 75 mcg tablet, Take 75 mcg by mouth daily, Disp: , Rfl: 3    loperamide (IMODIUM) 2 mg capsule, Take 1 capsule (2 mg total) by mouth 4 (four) times a day as needed for diarrhea, Disp: 12 capsule, Rfl: 0    melatonin 3 mg, Take 1 tablet (3 mg total) by mouth daily at bedtime, Disp: 30 tablet, Rfl: 0    metoprolol tartrate (LOPRESSOR) 25 mg tablet, Take 1 tablet (25 mg total) by mouth 2 (two) times a day, Disp: 60 tablet, Rfl: 0    saccharomyces boulardii (FLORASTOR) 250 mg capsule, Take 1 capsule by mouth daily  , Disp: , Rfl:     NIFEdipine (ADALAT CC) 30 MG 24 hr tablet, Take 1 tablet (30 mg total) by mouth daily (Patient not taking: Reported on 9/24/2020), Disp: 30 tablet, Rfl: 0    WELCHOL 625 MG tablet, as needed , Disp: , Rfl: 2    Past Medical History:   Diagnosis Date    Anxiety     Bowel obstruction (HCC)     Chronic kidney disease (CKD), stage IV (severe) (HCC)     stage IV    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    Compression fracture of cervical spine ([facility] [address] [facility]     Hernia of abdominal cavity     Hydronephrosis     Hypertension     Hypothyroid     Incontinence     Lung mass     Improving on PET/CT 1/2016    Polycythemia vera ([facility] [address] [facility]     Pulmonary embolism ([facility] [address] ) 2014    Shingles     Urinary tract infection      Past Surgical History:   Procedure Laterality Date    ABDOMINAL ADHESION SURGERY N/A 8/9/2020    Procedure: LYSIS ADHESIONS;  Surgeon: Ina Whyte DO;  [facility];  Service: General    BLADDER SUSPENSION      BOTOX INJECTION N/A 7/27/2016    Procedure: BOTOX INJECTION ;  Surgeon: Dena Winslow MD;  [facility];  Service:     CHOLECYSTECTOMY N/A     COLONOSCOPY      CYSTECTOMY, RADICAL WITH ILEOCONDUIT N/A 10/4/2016    Procedure: SUPRATRIGONAL CYSTECTOMY WITH ILEAL CONDUIT ;  Surgeon: Dena Winslow MD;  [facility];  Service:    Salena Savona W/ RETROGRADES Bilateral 7/27/2016    Procedure: Toña Amend; RETROGRADE PYELOGRAM ;  Surgeon: Dena Winslow MD;  [facility];  Service:     HERNIA REPAIR      IR NON-TUNNELED CENTRAL LINE PLACEMENT  7/17/2020    IR NON-TUNNELED CENTRAL LINE PLACEMENT  8/14/2020    LAPAROTOMY N/A 8/9/2020    Procedure: LAPAROTOMY EXPLORATORY, PARASTOMAL HERNIA REPAIR WITH MESH;  Surgeon: Ina Whyte DO;  [facility];  Service: General    IL COLONOSCOPY FLX DX W/COLLJ SPEC WHEN PFRMD N/A 8/31/2016    Procedure: COLONOSCOPY;  Surgeon: Eli Crenshaw MD;  [facility]; Service: Gastroenterology    ND CYSTOSCOPY,INSERT URETERAL STENT Bilateral 7/27/2016    Procedure: STENT INSERTION; EXCISION OF MESH ;  Surgeon: Husam Kruse MD;  [facility];  Service: Urology    TONSILLECTOMY      TUBAL LIGATION      WISDOM TOOTH EXTRACTION       Family History   Problem Relation Age of Onset    Cancer Mother         small cell cancer     Heart disease Father     COPD Father     Heart disease Brother     Nephrolithiasis Brother       reports that she has never smoked  She has never used smokeless tobacco  She reports previous alcohol use  She reports previous drug use  Physical Exam:   Vitals:    09/24/20 0929   BP: 128/78   BP Location: Left arm   Patient Position: Sitting   Cuff Size: Standard   Pulse: 74   Temp: (!) 96 9 °F (36 1 °C)   TempSrc: Temporal   Weight: 85 6 kg (188 lb 12 8 oz)   Height: 5' (1 524 m)     Body mass index is 36 87 kg/m²      General: conscious, cooperative, in not acute distress  Eyes: conjunctivae pale, anicteric sclerae  ENT: lips and mucous membranes moist  Neck: supple, no JVD  Chest: clear breath sounds bilateral, no crackles, ronchus or wheezings  CVS: distinct S1 & S2, normal rate, regular rhythm  Abdomen: soft, non-tender, non-distended, normoactive bowel sounds, ileostomy over right lower quadrant  Back: no CVA tenderness  Extremities: no significant edema of both legs  Skin: no rash  Neuro: awake, alert, oriented          Laboratory Results:  Lab Results   Component Value Date    WBC 4 61 09/22/2020    HGB 8 5 (L) 09/22/2020    HCT 26 9 (L) 09/22/2020    MCV 95 09/22/2020     09/22/2020     Lab Results   Component Value Date    SODIUM 136 09/22/2020    K 3 7 09/22/2020     (H) 09/22/2020    CO2 15 (L) 09/22/2020    BUN 43 (H) 09/22/2020    CREATININE 3 72 (H) 09/22/2020    GLUC 82 09/22/2020 CALCIUM 8 6 09/22/2020       Lab Results   Component Value Date     3 (H) 09/22/2020    CALCIUM 8 6 09/22/2020    PHOS 3 4 09/22/2020             Portions of the record may have been created with voice recognition software  Occasional wrong word or "sound a like" substitutions may have occurred due to the inherent limitations of voice recognition software  Read the chart carefully and recognize, using context, where substitutions have occurred  If you have any questions, please contact the dictating provider

## 2020-09-25 NOTE — H&P
H&P- Bernarda Moody [birth year], 77 y o  female MRN: [id number]    Unit/Bed#: -01 Encounter: [id number]    Primary Care Provider: Ale Bello MD   Date and time admitted to hospital: 8/24/2020  7:13 PM        * Encounter for surgical aftercare following surgery on the digestive system  Assessment & Plan  Patient had prolonged hospitalization for 2 weeks  She was admitted and [facility] from August 10 through August 24th for incarcerated peristomal hernia at the ileal conduct side  She is status post ex laparotomy and kirt stomal hernia repair with mesh and lysis of adhesions  He had postoperative difficulty with bowel function requiring bowel rest and NG tube placement which is resolved  IR placed IJ line due to difficulty accessing peripheral IV  Following the surgery patient was found to be severely deconditioned with inability to resume activity of daily living due to weakness and decreased endurance  Therefore recommended to be admitted at rehab for further strengthening  Will consult PT/OT for evaluation treatment  Polycythemia vera ([address] )  Assessment & Plan  · Follow-up with heme oncology  · Has been on Jakafi 10 mg daily will order to continue however unsure if it will be available  Hypertension  Assessment & Plan  Continue metoprolol tartrate  Was on nifedipine will resume if blood pressure above 130/80 given CKD and recent LUANN  Hypothyroidism  Assessment & Plan  Continue levothyroxine 75 mcg daily    Obstructive uropathy  Assessment & Plan  · This is a chronic problem  She follow-up with urology 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 elevated bladder pressure leading to hydronephrosis and CKD  · Last seen urology July 2019 was scheduled for yearly follow-up      Stage 4 chronic kidney disease (HCC)  Assessment & Plan  · Creatinine baseline 2-3 currently within baseline  · She has functional solitary right kidney with chronic 8 mm stone in the lower pole  · Outpatient follow-up with Nephrology September 24 20   · Intermittent IV fluids for worsening renal function  · Aggressively treat any evidence urinary tract infection given solitary right function kidney  Chronic saddle pulmonary embolism (HCC)  Assessment & Plan  · Continue home dose Eliquis 2 5 mg b i d  Unclear why reduce does given her age and weight does not meet criteria  · She does have history of subdural hematoma in the past  · Patient follow-up with heme oncology given history of polycythemia vera  History of shingles  Assessment & Plan  · About 2 weeks ago she had dermatomal skin lesion consistent with shingles on the right gluteus region lesions are crusted no longer need isolation  There is no post herpetic neuralgia  History of Clostridioides difficile colitis  Assessment & Plan  · Last infection 2019  · Treated with vancomycin prophylaxis while on Keflex in recent hospitalization  · Will discontinue vancomycin today as patient no longer on antibiotic for 48 hours  Anemia in CKD (chronic kidney disease)  Assessment & Plan  · Hemoglobin baseline 9-10 most recent hemoglobin prior to hospital discharge 7 4  · Will check CBC with differential in the morning  · Will check iron panel in the morning likely need IV Venofer  · Will transfuse for hemoglobin less than 7    Depression  Assessment & Plan  Continue citalopram 20 mg daily      VTE Prophylaxis: Apixaban (Eliquis)  / sequential compression device   Code Status:  Full code-level 1  POLST: POLST form is not discussed and not completed at this time  Discussion with family:  Discussed with son Eliceo Hugo  Anticipated Length of Stay:  Patient will be admitted on an SNF Short Term Inpatient basis with an anticipated length of stay of  more than 2 midnights  Justification for Hospital Stay:  Physical therapy and occupational therapy evaluation and treatment      Total Time for Visit, including Counseling / Coordination of Care: 1 hour  Greater than 50% of this total time spent on direct patient counseling and coordination of care  Chief Complaint:   Dizziness    History of Present Illness:    Justino Tenorio is a 77 y o  female who presents with past medical history of polycythemia vera, pulmonary embolism on Eliquis, CKD stage 4, hypertension, hypothyroidism, obstructive uropathy status post cystectomy aurinary diversion was just discharged from the hospital following incarcerated bowel obstruction and hernia repair  She was admitted to [facility] with abdominal pain associated with nausea and vomiting found to have incarcerated bowel obstruction around the ileal conduit for nonfunctional bladder for which general surgery did exploratory laparotomy with hernia repair and adhesion lysis on August 10, 2020  She was also treated for acute kidney injury on CKD by Nephrology with IV fluids  Her most of course was complicated by delayed bowel function treated by bowel rest and NG tube  She was noted to have anemia thought due to blood loss secondary to frequent labs hemoglobin was 7 4 upon discharge and her baseline 9-10  IR did right IJ central line given difficulty obtaining prefer IV access  She was also treated with Keflex for 5 days with oral vancomycin for Clostridium difficile prophylaxis given past medical history of C diff  At the time of my evaluation she reported dizziness otherwise denied shortness of breath, cough, fever, chills, numbness or tingling  She reported recent history of shingles on her right gluteal region  Review of Systems:    Review of Systems   Constitutional: Negative for chills and fever  HENT: Negative for rhinorrhea and sore throat  Eyes: Negative for pain and visual disturbance  Respiratory: Negative for cough and shortness of breath  Cardiovascular: Negative for chest pain and palpitations     Gastrointestinal: Negative for constipation, nausea and vomiting  Endocrine: Negative for cold intolerance and heat intolerance  Musculoskeletal: Negative for back pain and neck pain  Skin: Negative for color change and rash  Neurological: Positive for dizziness  Negative for numbness  Psychiatric/Behavioral: Negative for confusion and sleep disturbance         Past Medical and Surgical History:     Past Medical History:   Diagnosis Date    Anxiety     Chronic kidney disease (CKD), stage IV (severe) (HCC)     stage IV    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    Compression fracture of cervical spine (HCC)     Hydronephrosis     Hypertension     Hypothyroid     Incontinence     Lung mass     Improving on PET/CT 1/2016    [address]cy[address]a [address] [address]     Pulmonary embolism ([facility] [address] ) 2014    Urinary tract infection        Past Surgical History:   Procedure Laterality Date    ABDOMINAL ADHESION SURGERY N/A 8/9/2020    Procedure: LYSIS ADHESIONS;  Surgeon: Erich De León DO;  [facility];  Service: General    BLADDER SUSPENSION      BOTOX INJECTION N/A 7/27/2016    Procedure: BOTOX INJECTION ;  Surgeon: Glenis Lugo MD;  [facility];  Service:     CHOLECYSTECTOMY N/A     COLONOSCOPY      CYSTECTOMY, RADICAL WITH ILEOCONDUIT N/A 10/4/2016    Procedure: SUPRATRIGONAL CYSTECTOMY WITH ILEAL CONDUIT ;  Surgeon: Glenis Lugo MD;  [facility];  Service:    Ryan Bumps W/ RETROGRADES Bilateral 7/27/2016    Procedure: CYSTOSCOPY; RETROGRADE PYELOGRAM ;  Surgeon: Glenis Lugo MD;  [facility];  Service:     HERNIA REPAIR      IR NON-TUNNELED CENTRAL LINE PLACEMENT  7/17/2020    IR NON-TUNNELED CENTRAL LINE PLACEMENT  8/14/2020    LAPAROTOMY N/A 8/9/2020    Procedure: LAPAROTOMY EXPLORATORY, PARASTOMAL HERNIA REPAIR WITH MESH;  Surgeon: Erich De León DO;  [facility];  Service: General    KS COLONOSCOPY FLX DX W/COLLJ SPEC WHEN PFRMD N/A 8/31/2016    Procedure: COLONOSCOPY;  Surgeon: Tristen Rivas MD;  Location: [facility]; Service: Gastroenterology    ID CYSTOSCOPY,INSERT URETERAL STENT Bilateral 7/27/2016    Procedure: STENT INSERTION; EXCISION OF MESH ;  Surgeon: Lexus Ny MD;  [facility];  Service: Urology    TONSILLECTOMY      TUBAL LIGATION      WISDOM TOOTH EXTRACTION         Meds/Allergies:    Prior to Admission medications    Medication Sig Start Date End Date Taking? Authorizing Provider   citalopram (CeleXA) 20 mg tablet Take 20 mg by mouth daily  5/30/18  Yes Historical Provider, MD   Eliquis 2 5 MG TAKE 1 TABLET BY MOUTH TWICE A DAY 8/9/20  Yes Raquel Velez MD   JAKAFI 10 MG tablet Take 1 tablet (10 mg total) by mouth daily 2/12/20  Yes Raquel Velez MD   levothyroxine 75 mcg tablet Take 75 mcg by mouth daily 2/11/18  Yes Historical Provider, MD   melatonin 3 mg Take 1 tablet (3 mg total) by mouth daily at bedtime 7/19/20  Yes Carmen Baer DO   metoprolol tartrate (LOPRESSOR) 25 mg tablet Take 1 tablet (25 mg total) by mouth 2 (two) times a day 7/19/20  Yes Carmen Baer DO   vancomycin (VANCOCIN) 50mg/mL SOLN Take 2 5 mL (125 mg total) by mouth every 12 (twelve) hours for 2 days 8/24/20 8/26/20 Yes Yuliana Clemens MD   acetaminophen (TYLENOL) 325 mg tablet 650 mg every 6 hours as needed for mild pain or headache  Patient taking differently: as needed 650 mg every 6 hours as needed for mild pain or headache 1/14/19   [place] DIMITRI [place]   calcitriol (ROCALTROL) 0 25 mcg capsule TAKE 1 CAPSULE BY MOUTH EVERY OTHER DAY  5/11/20   Terell Aguirre MD   Cholecalciferol (VITAMIN D3) 1000 UNITS CAPS Take 1,000 unit marking on U-100 syringe by mouth daily      Historical Provider, MD   loperamide (IMODIUM) 2 mg capsule Take 1 capsule (2 mg total) by mouth 4 (four) times a day as needed for diarrhea 7/14/20   Maddi Preciado MD   NIFEdipine (ADALAT CC) 30 MG 24 hr tablet Take 1 tablet (30 mg total) by mouth daily 7/20/20   Liv Davis Maxx Labrum, DO   saccharomyces boulardii (FLORASTOR) 250 mg capsule Take 1 capsule by mouth daily      Historical Provider, MD   [facility] 625 MG tablet as needed  12/28/17   Historical Provider, MD     I have reviewed home medications with patient personally  Allergies: Allergies   Allergen Reactions    Chlorhexidine Rash     petichi like rash when using chlorhexidine swabs prior to IV  Social History:     Marital Status:    Occupation:  Retired banker  Patient Pre-hospital Living Situation:  Lives independent and her autistic son live with her  Patient Pre-hospital Level of Mobility:  Does not use a cane or walker but she has it at home  Patient Pre-hospital Diet Restrictions:  No restriction  Substance Use History:   Social History     Substance and Sexual Activity   Alcohol Use Not Currently    Frequency: Never    Binge frequency: Never    Comment: n/s     Social History     Tobacco Use   Smoking Status Never Smoker   Smokeless Tobacco Never Used   Tobacco Comment    n/a     Social History     Substance and Sexual Activity   Drug Use Not Currently    Comment: n/a       Family History:    non-contributory    Physical Exam:     Vitals:   Blood Pressure: 123/74 (08/25/20 0707)  Pulse: 80 (08/25/20 0707)  Temperature: 97 5 °F (36 4 °C) (08/25/20 0707)  Temp Source: Temporal (08/25/20 0707)  Respirations: 19 (08/25/20 0707)  Weight - Scale: 78 9 kg (173 lb 15 1 oz) (08/24/20 1918)  SpO2: 96 % (08/25/20 0707)    Physical Exam  Vitals signs reviewed  Constitutional:       General: She is not in acute distress  Appearance: Normal appearance  She is obese  She is not ill-appearing  HENT:      Head: Normocephalic and atraumatic  Nose: No rhinorrhea  Mouth/Throat:      Mouth: Mucous membranes are moist       Pharynx: No oropharyngeal exudate or posterior oropharyngeal erythema  Eyes:      General:         Right eye: No discharge  Left eye: No discharge        Comments: Pale Neck:      Musculoskeletal: Neck supple  No neck rigidity  Cardiovascular:      Rate and Rhythm: Normal rate and regular rhythm  Heart sounds: Normal heart sounds  No murmur  Pulmonary:      Effort: Pulmonary effort is normal  No respiratory distress  Breath sounds: Normal breath sounds  No stridor  No wheezing or rales  Abdominal:      General: Bowel sounds are normal       Palpations: Abdomen is soft  There is no mass  Comments: Right lower quadrant urinary diversion ostomy with urine  Midline laparotomy surgical incision with clips no swelling or erythema  Red there is a small serosanguineous discharge  Musculoskeletal:      Right lower leg: No edema  Left lower leg: No edema  Skin:     General: Skin is dry  Comments: Multiple ecchymosis in the upper extremity   Neurological:      Mental Status: She is alert and oriented to person, place, and time  Psychiatric:         Mood and Affect: Mood normal          Behavior: Behavior normal            Additional Data:     Lab Results: I have personally reviewed pertinent reports  Results from last 7 days   Lab Units 08/23/20  0513   WBC Thousand/uL 8 25   HEMOGLOBIN g/dL 7 4*   HEMATOCRIT % 24 1*   PLATELETS Thousands/uL 202   BANDS PCT % 1   LYMPHO PCT % 5*   MONO PCT % 6   EOS PCT % 1     Results from last 7 days   Lab Units 08/24/20  0506   SODIUM mmol/L 137   POTASSIUM mmol/L 4 1   CHLORIDE mmol/L 107   CO2 mmol/L 22   BUN mg/dL 38*   CREATININE mg/dL 3 10*   ANION GAP mmol/L 8   CALCIUM mg/dL 8 1*   GLUCOSE RANDOM mg/dL 91         Results from last 7 days   Lab Units 08/18/20  2114 08/18/20  1557   POC GLUCOSE mg/dl 98 111               Imaging: I have personally reviewed pertinent reports  No orders to display       EKG, Pathology, and Other Studies Reviewed on Admission:   · EKG:  Normal sinus rhythm, nonspecific ST and T-wave abnormality and prolonged QT on July 15, 2020    [employer] / Supramed Records Reviewed:  Yes ** Please Note: This note has been constructed using a voice recognition system   ** negative...

## 2020-09-26 NOTE — ASSESSMENT & PLAN NOTE
Continue metoprolol tartrate  Was on nifedipine will resume if blood pressure above 130/80 given CKD and recent LUANN  Patient is a 59 y/o female with PMHx of DM, HLD, Hypothyroid, and back pain ( S/P L4 Discectomy at [place] with Dr Mayfield- 3 days prior this ED presentation ) whom presented to [facility] s/p fall a few hours prior to hospital presentation. She notes she has not been able to sleep for the last three days as she has been having lower back pain post discectomy. She was going down the stairs of her home to acquire medications when she tripped down the last three steps with both hands outstretched. She notes injury to B/L wrists and arms, as well as injuring her left foot ( left second toenail). She notes she struck the right side of her face, she did not have LOC. Currently she has the most pain over her right temporal area. She denies dizziness, change in vision/ hearing/ speech, emesis, nor severe headache.

## 2020-09-28 ENCOUNTER — TELEPHONE (OUTPATIENT)
Dept: SURGERY | Facility: CLINIC | Age: 75
End: 2020-09-28

## 2020-09-28 NOTE — TELEPHONE ENCOUNTER
Shoshana< [facility] nurse called to find out if Richard Beatty had a f/u appointment  According to [facility] no f/u appointment scheduled  Keeping wound until closed  Advised her to call back if anything else needed

## 2020-10-05 ENCOUNTER — APPOINTMENT (OUTPATIENT)
Dept: LAB | Age: 75
End: 2020-10-05
Payer: COMMERCIAL

## 2020-10-05 DIAGNOSIS — N18.5 STAGE 5 CHRONIC KIDNEY DISEASE NOT ON CHRONIC DIALYSIS (HCC): ICD-10-CM

## 2020-10-05 DIAGNOSIS — N18.5 CKD (CHRONIC KIDNEY DISEASE), STAGE V (HCC): ICD-10-CM

## 2020-10-05 DIAGNOSIS — D45 POLYCYTHEMIA VERA (HCC): ICD-10-CM

## 2020-10-05 LAB
ERYTHROCYTE [DISTWIDTH] IN BLOOD BY AUTOMATED COUNT: 15.5 % (ref 11.6–15.1)
HCT VFR BLD AUTO: 29.5 % (ref 34.8–46.1)
HGB BLD-MCNC: 9.4 G/DL (ref 11.5–15.4)
MCH RBC QN AUTO: 30.2 PG (ref 26.8–34.3)
MCHC RBC AUTO-ENTMCNC: 31.9 G/DL (ref 31.4–37.4)
MCV RBC AUTO: 95 FL (ref 82–98)
PLATELET # BLD AUTO: 270 THOUSANDS/UL (ref 149–390)
PMV BLD AUTO: 10.2 FL (ref 8.9–12.7)
RBC # BLD AUTO: 3.11 MILLION/UL (ref 3.81–5.12)
WBC # BLD AUTO: 4.2 THOUSAND/UL (ref 4.31–10.16)

## 2020-10-05 PROCEDURE — 36415 COLL VENOUS BLD VENIPUNCTURE: CPT

## 2020-10-05 PROCEDURE — 83970 ASSAY OF PARATHORMONE: CPT

## 2020-10-05 PROCEDURE — 80069 RENAL FUNCTION PANEL: CPT

## 2020-10-05 PROCEDURE — 85027 COMPLETE CBC AUTOMATED: CPT

## 2020-10-06 ENCOUNTER — OFFICE VISIT (OUTPATIENT)
Dept: UROLOGY | Facility: AMBULATORY SURGERY CENTER | Age: 75
End: 2020-10-06
Payer: COMMERCIAL

## 2020-10-06 VITALS
BODY MASS INDEX: 36.91 KG/M2 | WEIGHT: 188 LBS | SYSTOLIC BLOOD PRESSURE: 128 MMHG | HEIGHT: 60 IN | TEMPERATURE: 97.5 F | DIASTOLIC BLOOD PRESSURE: 64 MMHG | HEART RATE: 60 BPM

## 2020-10-06 DIAGNOSIS — N18.5 STAGE 5 CHRONIC KIDNEY DISEASE NOT ON CHRONIC DIALYSIS (HCC): Primary | ICD-10-CM

## 2020-10-06 LAB
ALBUMIN SERPL BCP-MCNC: 3.6 G/DL (ref 3.5–5)
ANION GAP SERPL CALCULATED.3IONS-SCNC: 7 MMOL/L (ref 4–13)
BUN SERPL-MCNC: 50 MG/DL (ref 5–25)
CALCIUM SERPL-MCNC: 8.6 MG/DL (ref 8.3–10.1)
CHLORIDE SERPL-SCNC: 114 MMOL/L (ref 100–108)
CO2 SERPL-SCNC: 18 MMOL/L (ref 21–32)
CREAT SERPL-MCNC: 3.95 MG/DL (ref 0.6–1.3)
GFR SERPL CREATININE-BSD FRML MDRD: 11 ML/MIN/1.73SQ M
GLUCOSE P FAST SERPL-MCNC: 97 MG/DL (ref 65–99)
PHOSPHATE SERPL-MCNC: 3.3 MG/DL (ref 2.3–4.1)
POTASSIUM SERPL-SCNC: 4.5 MMOL/L (ref 3.5–5.3)
PTH-INTACT SERPL-MCNC: 173.7 PG/ML (ref 18.4–80.1)
SODIUM SERPL-SCNC: 139 MMOL/L (ref 136–145)

## 2020-10-06 PROCEDURE — 99214 OFFICE O/P EST MOD 30 MIN: CPT | Performed by: UROLOGY

## 2020-10-19 ENCOUNTER — LAB (OUTPATIENT)
Dept: LAB | Facility: CLINIC | Age: 75
End: 2020-10-19
Payer: COMMERCIAL

## 2020-10-19 DIAGNOSIS — D45 POLYCYTHEMIA VERA (HCC): ICD-10-CM

## 2020-10-19 DIAGNOSIS — N18.5 STAGE 5 CHRONIC KIDNEY DISEASE NOT ON CHRONIC DIALYSIS (HCC): ICD-10-CM

## 2020-10-19 LAB
ALBUMIN SERPL BCP-MCNC: 3.5 G/DL (ref 3.5–5)
ALP SERPL-CCNC: 46 U/L (ref 46–116)
ALT SERPL W P-5'-P-CCNC: 10 U/L (ref 12–78)
ANION GAP SERPL CALCULATED.3IONS-SCNC: 7 MMOL/L (ref 4–13)
AST SERPL W P-5'-P-CCNC: 11 U/L (ref 5–45)
BASOPHILS # BLD AUTO: 0.03 THOUSANDS/ΜL (ref 0–0.1)
BASOPHILS NFR BLD AUTO: 1 % (ref 0–1)
BILIRUB SERPL-MCNC: 0.58 MG/DL (ref 0.2–1)
BUN SERPL-MCNC: 46 MG/DL (ref 5–25)
CALCIUM SERPL-MCNC: 8.2 MG/DL (ref 8.3–10.1)
CHLORIDE SERPL-SCNC: 111 MMOL/L (ref 100–108)
CO2 SERPL-SCNC: 20 MMOL/L (ref 21–32)
CREAT SERPL-MCNC: 3.71 MG/DL (ref 0.6–1.3)
EOSINOPHIL # BLD AUTO: 0.04 THOUSAND/ΜL (ref 0–0.61)
EOSINOPHIL NFR BLD AUTO: 1 % (ref 0–6)
ERYTHROCYTE [DISTWIDTH] IN BLOOD BY AUTOMATED COUNT: 14.9 % (ref 11.6–15.1)
GFR SERPL CREATININE-BSD FRML MDRD: 11 ML/MIN/1.73SQ M
GLUCOSE P FAST SERPL-MCNC: 89 MG/DL (ref 65–99)
HCT VFR BLD AUTO: 29.6 % (ref 34.8–46.1)
HGB BLD-MCNC: 9.4 G/DL (ref 11.5–15.4)
IMM GRANULOCYTES # BLD AUTO: 0.03 THOUSAND/UL (ref 0–0.2)
IMM GRANULOCYTES NFR BLD AUTO: 1 % (ref 0–2)
LYMPHOCYTES # BLD AUTO: 1.73 THOUSANDS/ΜL (ref 0.6–4.47)
LYMPHOCYTES NFR BLD AUTO: 42 % (ref 14–44)
MCH RBC QN AUTO: 30.2 PG (ref 26.8–34.3)
MCHC RBC AUTO-ENTMCNC: 31.8 G/DL (ref 31.4–37.4)
MCV RBC AUTO: 95 FL (ref 82–98)
MONOCYTES # BLD AUTO: 0.26 THOUSAND/ΜL (ref 0.17–1.22)
MONOCYTES NFR BLD AUTO: 6 % (ref 4–12)
NEUTROPHILS # BLD AUTO: 2.06 THOUSANDS/ΜL (ref 1.85–7.62)
NEUTS SEG NFR BLD AUTO: 49 % (ref 43–75)
NRBC BLD AUTO-RTO: 0 /100 WBCS
PLATELET # BLD AUTO: 297 THOUSANDS/UL (ref 149–390)
PMV BLD AUTO: 10.6 FL (ref 8.9–12.7)
POTASSIUM SERPL-SCNC: 4.2 MMOL/L (ref 3.5–5.3)
PROT SERPL-MCNC: 6.7 G/DL (ref 6.4–8.2)
RBC # BLD AUTO: 3.11 MILLION/UL (ref 3.81–5.12)
SODIUM SERPL-SCNC: 138 MMOL/L (ref 136–145)
VIT B12 SERPL-MCNC: 351 PG/ML (ref 100–900)
WBC # BLD AUTO: 4.15 THOUSAND/UL (ref 4.31–10.16)

## 2020-10-19 PROCEDURE — 85025 COMPLETE CBC W/AUTO DIFF WBC: CPT

## 2020-10-19 PROCEDURE — 80053 COMPREHEN METABOLIC PANEL: CPT

## 2020-10-19 PROCEDURE — 36415 COLL VENOUS BLD VENIPUNCTURE: CPT

## 2020-10-19 PROCEDURE — 82607 VITAMIN B-12: CPT

## 2020-10-21 ENCOUNTER — TELEPHONE (OUTPATIENT)
Dept: VASCULAR SURGERY | Facility: CLINIC | Age: 75
End: 2020-10-21

## 2020-10-22 ENCOUNTER — HOSPITAL ENCOUNTER (OUTPATIENT)
Dept: NON INVASIVE DIAGNOSTICS | Facility: CLINIC | Age: 75
Discharge: HOME | End: 2020-10-22
Payer: COMMERCIAL

## 2020-10-22 ENCOUNTER — CONSULT (OUTPATIENT)
Dept: VASCULAR SURGERY | Facility: CLINIC | Age: 75
End: 2020-10-22
Payer: COMMERCIAL

## 2020-10-22 ENCOUNTER — TELEPHONE (OUTPATIENT)
Dept: HEMATOLOGY ONCOLOGY | Facility: CLINIC | Age: 75
End: 2020-10-22

## 2020-10-22 ENCOUNTER — TELEPHONE (OUTPATIENT)
Dept: VASCULAR SURGERY | Facility: CLINIC | Age: 75
End: 2020-10-22

## 2020-10-22 VITALS
HEART RATE: 70 BPM | TEMPERATURE: 98.4 F | RESPIRATION RATE: 18 BRPM | BODY MASS INDEX: 36.91 KG/M2 | SYSTOLIC BLOOD PRESSURE: 126 MMHG | DIASTOLIC BLOOD PRESSURE: 82 MMHG | HEIGHT: 60 IN | WEIGHT: 188 LBS

## 2020-10-22 DIAGNOSIS — D63.1 ANEMIA IN STAGE 5 CHRONIC KIDNEY DISEASE, NOT ON CHRONIC DIALYSIS (HCC): ICD-10-CM

## 2020-10-22 DIAGNOSIS — N18.5 STAGE 5 CHRONIC KIDNEY DISEASE NOT ON CHRONIC DIALYSIS (HCC): Primary | ICD-10-CM

## 2020-10-22 DIAGNOSIS — N18.5 STAGE 5 CHRONIC KIDNEY DISEASE NOT ON CHRONIC DIALYSIS (HCC): ICD-10-CM

## 2020-10-22 DIAGNOSIS — N18.5 ANEMIA IN STAGE 5 CHRONIC KIDNEY DISEASE, NOT ON CHRONIC DIALYSIS (HCC): ICD-10-CM

## 2020-10-22 PROCEDURE — 99203 OFFICE O/P NEW LOW 30 MIN: CPT | Performed by: SURGERY

## 2020-10-22 PROCEDURE — 93985 DUP-SCAN HEMO COMPL BI STD: CPT | Performed by: SURGERY

## 2020-10-22 PROCEDURE — 93985 DUP-SCAN HEMO COMPL BI STD: CPT

## 2020-10-23 ENCOUNTER — OFFICE VISIT (OUTPATIENT)
Dept: HEMATOLOGY ONCOLOGY | Facility: CLINIC | Age: 75
End: 2020-10-23
Payer: COMMERCIAL

## 2020-10-23 ENCOUNTER — OFFICE VISIT (OUTPATIENT)
Dept: CARDIOLOGY CLINIC | Facility: CLINIC | Age: 75
End: 2020-10-23
Payer: COMMERCIAL

## 2020-10-23 VITALS
HEART RATE: 57 BPM | OXYGEN SATURATION: 99 % | SYSTOLIC BLOOD PRESSURE: 130 MMHG | WEIGHT: 189.9 LBS | TEMPERATURE: 97.7 F | DIASTOLIC BLOOD PRESSURE: 88 MMHG | BODY MASS INDEX: 37.09 KG/M2

## 2020-10-23 VITALS
BODY MASS INDEX: 37.18 KG/M2 | OXYGEN SATURATION: 98 % | DIASTOLIC BLOOD PRESSURE: 90 MMHG | WEIGHT: 189.4 LBS | HEART RATE: 60 BPM | SYSTOLIC BLOOD PRESSURE: 130 MMHG | RESPIRATION RATE: 18 BRPM | HEIGHT: 60 IN | TEMPERATURE: 98.8 F

## 2020-10-23 DIAGNOSIS — R26.2 AMBULATORY DYSFUNCTION: ICD-10-CM

## 2020-10-23 DIAGNOSIS — I26.92 CHRONIC SADDLE PULMONARY EMBOLISM WITHOUT ACUTE COR PULMONALE (HCC): Primary | Chronic | ICD-10-CM

## 2020-10-23 DIAGNOSIS — N18.5 ANEMIA IN STAGE 5 CHRONIC KIDNEY DISEASE, NOT ON CHRONIC DIALYSIS (HCC): ICD-10-CM

## 2020-10-23 DIAGNOSIS — I26.92 CHRONIC SADDLE PULMONARY EMBOLISM WITHOUT ACUTE COR PULMONALE (HCC): Chronic | ICD-10-CM

## 2020-10-23 DIAGNOSIS — I10 ESSENTIAL HYPERTENSION: ICD-10-CM

## 2020-10-23 DIAGNOSIS — Z01.810 PREOP CARDIOVASCULAR EXAM: Primary | ICD-10-CM

## 2020-10-23 DIAGNOSIS — D75.1 POLYCYTHEMIA: ICD-10-CM

## 2020-10-23 DIAGNOSIS — D45 POLYCYTHEMIA VERA (HCC): ICD-10-CM

## 2020-10-23 DIAGNOSIS — D63.1 ANEMIA IN STAGE 5 CHRONIC KIDNEY DISEASE, NOT ON CHRONIC DIALYSIS (HCC): ICD-10-CM

## 2020-10-23 DIAGNOSIS — I27.82 CHRONIC SADDLE PULMONARY EMBOLISM WITHOUT ACUTE COR PULMONALE (HCC): Primary | Chronic | ICD-10-CM

## 2020-10-23 DIAGNOSIS — R06.00 EXERTIONAL DYSPNEA: ICD-10-CM

## 2020-10-23 DIAGNOSIS — N18.5 STAGE 5 CHRONIC KIDNEY DISEASE NOT ON CHRONIC DIALYSIS (HCC): ICD-10-CM

## 2020-10-23 DIAGNOSIS — E78.5 HYPERLIPIDEMIA, UNSPECIFIED HYPERLIPIDEMIA TYPE: ICD-10-CM

## 2020-10-23 DIAGNOSIS — I27.82 CHRONIC SADDLE PULMONARY EMBOLISM WITHOUT ACUTE COR PULMONALE (HCC): Chronic | ICD-10-CM

## 2020-10-23 PROCEDURE — 99213 OFFICE O/P EST LOW 20 MIN: CPT | Performed by: PHYSICIAN ASSISTANT

## 2020-10-23 PROCEDURE — 93000 ELECTROCARDIOGRAM COMPLETE: CPT | Performed by: INTERNAL MEDICINE

## 2020-10-23 PROCEDURE — 99204 OFFICE O/P NEW MOD 45 MIN: CPT | Performed by: INTERNAL MEDICINE

## 2020-10-23 RX ORDER — ATORVASTATIN CALCIUM 20 MG/1
20 TABLET, FILM COATED ORAL DAILY
Qty: 90 TABLET | Refills: 3 | Status: SHIPPED | OUTPATIENT
Start: 2020-10-23 | End: 2020-12-11 | Stop reason: SDUPTHER

## 2020-11-03 ENCOUNTER — OFFICE VISIT (OUTPATIENT)
Dept: NEPHROLOGY | Facility: CLINIC | Age: 75
End: 2020-11-03
Payer: COMMERCIAL

## 2020-11-03 VITALS
DIASTOLIC BLOOD PRESSURE: 80 MMHG | BODY MASS INDEX: 37.34 KG/M2 | TEMPERATURE: 97.6 F | SYSTOLIC BLOOD PRESSURE: 136 MMHG | HEART RATE: 74 BPM | HEIGHT: 60 IN | WEIGHT: 190.2 LBS

## 2020-11-03 DIAGNOSIS — N18.5 STAGE 5 CHRONIC KIDNEY DISEASE NOT ON CHRONIC DIALYSIS (HCC): Primary | ICD-10-CM

## 2020-11-03 DIAGNOSIS — N18.5 ANEMIA IN STAGE 5 CHRONIC KIDNEY DISEASE, NOT ON CHRONIC DIALYSIS (HCC): ICD-10-CM

## 2020-11-03 DIAGNOSIS — D63.1 ANEMIA IN STAGE 5 CHRONIC KIDNEY DISEASE, NOT ON CHRONIC DIALYSIS (HCC): ICD-10-CM

## 2020-11-03 DIAGNOSIS — N25.81 SECONDARY HYPERPARATHYROIDISM OF RENAL ORIGIN (HCC): ICD-10-CM

## 2020-11-03 DIAGNOSIS — I10 ESSENTIAL HYPERTENSION: ICD-10-CM

## 2020-11-03 DIAGNOSIS — D45 POLYCYTHEMIA VERA (HCC): ICD-10-CM

## 2020-11-03 DIAGNOSIS — N13.9 OBSTRUCTIVE UROPATHY: ICD-10-CM

## 2020-11-03 PROCEDURE — 99214 OFFICE O/P EST MOD 30 MIN: CPT | Performed by: INTERNAL MEDICINE

## 2020-11-04 ENCOUNTER — TELEPHONE (OUTPATIENT)
Dept: NEPHROLOGY | Facility: CLINIC | Age: 75
End: 2020-11-04

## 2020-11-04 ENCOUNTER — HOSPITAL ENCOUNTER (OUTPATIENT)
Dept: INFUSION CENTER | Facility: HOSPITAL | Age: 75
Discharge: HOME | End: 2020-11-04
Attending: INTERNAL MEDICINE
Payer: COMMERCIAL

## 2020-11-04 VITALS
TEMPERATURE: 98.8 F | SYSTOLIC BLOOD PRESSURE: 140 MMHG | HEART RATE: 73 BPM | DIASTOLIC BLOOD PRESSURE: 82 MMHG | RESPIRATION RATE: 18 BRPM

## 2020-11-04 DIAGNOSIS — N18.5 ANEMIA IN STAGE 5 CHRONIC KIDNEY DISEASE, NOT ON CHRONIC DIALYSIS (HCC): Primary | ICD-10-CM

## 2020-11-04 DIAGNOSIS — D63.1 ANEMIA IN STAGE 5 CHRONIC KIDNEY DISEASE, NOT ON CHRONIC DIALYSIS (HCC): Primary | ICD-10-CM

## 2020-11-04 PROCEDURE — 96372 THER/PROPH/DIAG INJ SC/IM: CPT

## 2020-11-04 RX ADMIN — DARBEPOETIN ALFA 100 MCG: 100 INJECTION, SOLUTION INTRAVENOUS; SUBCUTANEOUS at 14:58

## 2020-11-05 DIAGNOSIS — Z01.810 PRE-OPERATIVE CARDIOVASCULAR EXAMINATION: Primary | ICD-10-CM

## 2020-11-05 DIAGNOSIS — R06.00 EXERTIONAL DYSPNEA: ICD-10-CM

## 2020-11-23 ENCOUNTER — LAB (OUTPATIENT)
Dept: LAB | Facility: CLINIC | Age: 75
End: 2020-11-23
Payer: COMMERCIAL

## 2020-11-23 DIAGNOSIS — N18.5 STAGE 5 CHRONIC KIDNEY DISEASE NOT ON CHRONIC DIALYSIS (HCC): ICD-10-CM

## 2020-11-23 DIAGNOSIS — N18.5 ANEMIA IN STAGE 5 CHRONIC KIDNEY DISEASE, NOT ON CHRONIC DIALYSIS (HCC): ICD-10-CM

## 2020-11-23 DIAGNOSIS — D63.1 ANEMIA IN STAGE 5 CHRONIC KIDNEY DISEASE, NOT ON CHRONIC DIALYSIS (HCC): ICD-10-CM

## 2020-11-23 LAB
ANION GAP SERPL CALCULATED.3IONS-SCNC: 7 MMOL/L (ref 4–13)
BASOPHILS # BLD AUTO: 0.04 THOUSANDS/ΜL (ref 0–0.1)
BASOPHILS NFR BLD AUTO: 1 % (ref 0–1)
BUN SERPL-MCNC: 50 MG/DL (ref 5–25)
CALCIUM SERPL-MCNC: 8.7 MG/DL (ref 8.3–10.1)
CHLORIDE SERPL-SCNC: 111 MMOL/L (ref 100–108)
CO2 SERPL-SCNC: 20 MMOL/L (ref 21–32)
CREAT SERPL-MCNC: 3.54 MG/DL (ref 0.6–1.3)
EOSINOPHIL # BLD AUTO: 0.05 THOUSAND/ΜL (ref 0–0.61)
EOSINOPHIL NFR BLD AUTO: 1 % (ref 0–6)
ERYTHROCYTE [DISTWIDTH] IN BLOOD BY AUTOMATED COUNT: 14.7 % (ref 11.6–15.1)
GFR SERPL CREATININE-BSD FRML MDRD: 12 ML/MIN/1.73SQ M
GLUCOSE P FAST SERPL-MCNC: 91 MG/DL (ref 65–99)
HCT VFR BLD AUTO: 32.4 % (ref 34.8–46.1)
HGB BLD-MCNC: 10 G/DL (ref 11.5–15.4)
IMM GRANULOCYTES # BLD AUTO: 0.02 THOUSAND/UL (ref 0–0.2)
IMM GRANULOCYTES NFR BLD AUTO: 1 % (ref 0–2)
LYMPHOCYTES # BLD AUTO: 1.17 THOUSANDS/ΜL (ref 0.6–4.47)
LYMPHOCYTES NFR BLD AUTO: 30 % (ref 14–44)
MCH RBC QN AUTO: 29.7 PG (ref 26.8–34.3)
MCHC RBC AUTO-ENTMCNC: 30.9 G/DL (ref 31.4–37.4)
MCV RBC AUTO: 96 FL (ref 82–98)
MONOCYTES # BLD AUTO: 0.29 THOUSAND/ΜL (ref 0.17–1.22)
MONOCYTES NFR BLD AUTO: 7 % (ref 4–12)
NEUTROPHILS # BLD AUTO: 2.4 THOUSANDS/ΜL (ref 1.85–7.62)
NEUTS SEG NFR BLD AUTO: 60 % (ref 43–75)
NRBC BLD AUTO-RTO: 0 /100 WBCS
PLATELET # BLD AUTO: 300 THOUSANDS/UL (ref 149–390)
PMV BLD AUTO: 10.6 FL (ref 8.9–12.7)
POTASSIUM SERPL-SCNC: 4.4 MMOL/L (ref 3.5–5.3)
RBC # BLD AUTO: 3.37 MILLION/UL (ref 3.81–5.12)
SODIUM SERPL-SCNC: 138 MMOL/L (ref 136–145)
WBC # BLD AUTO: 3.97 THOUSAND/UL (ref 4.31–10.16)

## 2020-11-23 PROCEDURE — 80048 BASIC METABOLIC PNL TOTAL CA: CPT

## 2020-11-23 PROCEDURE — 36415 COLL VENOUS BLD VENIPUNCTURE: CPT

## 2020-11-23 PROCEDURE — 85025 COMPLETE CBC W/AUTO DIFF WBC: CPT

## 2020-12-02 ENCOUNTER — HOSPITAL ENCOUNTER (OUTPATIENT)
Dept: INFUSION CENTER | Facility: HOSPITAL | Age: 75
Discharge: HOME | End: 2020-12-02
Attending: INTERNAL MEDICINE

## 2020-12-09 ENCOUNTER — HOSPITAL ENCOUNTER (OUTPATIENT)
Dept: NON INVASIVE DIAGNOSTICS | Facility: CLINIC | Age: 75
Discharge: HOME | End: 2020-12-09
Payer: COMMERCIAL

## 2020-12-09 DIAGNOSIS — Z01.810 PRE-OPERATIVE CARDIOVASCULAR EXAMINATION: ICD-10-CM

## 2020-12-09 DIAGNOSIS — R06.00 EXERTIONAL DYSPNEA: ICD-10-CM

## 2020-12-09 LAB
CHEST PAIN STATEMENT: NORMAL
MAX DIASTOLIC BP: 100 MMHG
MAX HEART RATE: 62 BPM
MAX PREDICTED HEART RATE: 146 BPM
MAX. SYSTOLIC BP: 182 MMHG
PROTOCOL NAME: NORMAL
REASON FOR TERMINATION: NORMAL
TARGET HR FORMULA: NORMAL
TEST INDICATION: NORMAL
TIME IN EXERCISE PHASE: NORMAL

## 2020-12-09 PROCEDURE — 93018 CV STRESS TEST I&R ONLY: CPT | Performed by: INTERNAL MEDICINE

## 2020-12-09 PROCEDURE — 93017 CV STRESS TEST TRACING ONLY: CPT

## 2020-12-09 PROCEDURE — 93306 TTE W/DOPPLER COMPLETE: CPT

## 2020-12-09 PROCEDURE — 78452 HT MUSCLE IMAGE SPECT MULT: CPT

## 2020-12-09 PROCEDURE — 93306 TTE W/DOPPLER COMPLETE: CPT | Performed by: INTERNAL MEDICINE

## 2020-12-09 PROCEDURE — 93016 CV STRESS TEST SUPVJ ONLY: CPT | Performed by: INTERNAL MEDICINE

## 2020-12-09 PROCEDURE — G1004 CDSM NDSC: HCPCS

## 2020-12-09 PROCEDURE — A9502 TC99M TETROFOSMIN: HCPCS

## 2020-12-09 PROCEDURE — 78452 HT MUSCLE IMAGE SPECT MULT: CPT | Performed by: INTERNAL MEDICINE

## 2020-12-09 RX ADMIN — REGADENOSON 0.4 MG: 0.08 INJECTION, SOLUTION INTRAVENOUS at 12:20

## 2020-12-11 ENCOUNTER — TELEPHONE (OUTPATIENT)
Dept: CARDIOLOGY CLINIC | Facility: CLINIC | Age: 75
End: 2020-12-11

## 2020-12-11 DIAGNOSIS — E78.5 HYPERLIPIDEMIA, UNSPECIFIED HYPERLIPIDEMIA TYPE: ICD-10-CM

## 2020-12-11 RX ORDER — ATORVASTATIN CALCIUM 40 MG/1
40 TABLET, FILM COATED ORAL DAILY
Qty: 90 TABLET | Refills: 6 | Status: ON HOLD | OUTPATIENT
Start: 2020-12-11 | End: 2021-08-03 | Stop reason: SDUPTHER

## 2020-12-11 RX ORDER — ASPIRIN 81 MG/1
81 TABLET ORAL DAILY
Qty: 90 TABLET | Refills: 4 | Status: SHIPPED | OUTPATIENT
Start: 2020-12-11 | End: 2021-07-07

## 2020-12-14 ENCOUNTER — PREP FOR PROCEDURE (OUTPATIENT)
Dept: VASCULAR SURGERY | Facility: CLINIC | Age: 75
End: 2020-12-14

## 2020-12-14 DIAGNOSIS — N18.5 CHRONIC KIDNEY DISEASE, STAGE V (HCC): Primary | ICD-10-CM

## 2020-12-28 ENCOUNTER — LAB (OUTPATIENT)
Dept: LAB | Facility: CLINIC | Age: 75
End: 2020-12-28
Payer: COMMERCIAL

## 2020-12-28 ENCOUNTER — ANESTHESIA EVENT (OUTPATIENT)
Dept: PERIOP | Facility: HOSPITAL | Age: 75
End: 2020-12-28
Payer: COMMERCIAL

## 2020-12-28 DIAGNOSIS — N18.5 CHRONIC KIDNEY DISEASE, STAGE V (HCC): ICD-10-CM

## 2020-12-28 DIAGNOSIS — N18.5 ANEMIA IN STAGE 5 CHRONIC KIDNEY DISEASE, NOT ON CHRONIC DIALYSIS (HCC): ICD-10-CM

## 2020-12-28 DIAGNOSIS — D63.1 ANEMIA IN STAGE 5 CHRONIC KIDNEY DISEASE, NOT ON CHRONIC DIALYSIS (HCC): ICD-10-CM

## 2020-12-28 DIAGNOSIS — N18.5 STAGE 5 CHRONIC KIDNEY DISEASE NOT ON CHRONIC DIALYSIS (HCC): ICD-10-CM

## 2020-12-28 LAB
ALBUMIN SERPL BCP-MCNC: 3.6 G/DL (ref 3.5–5)
ANION GAP SERPL CALCULATED.3IONS-SCNC: 6 MMOL/L (ref 4–13)
BASOPHILS # BLD AUTO: 0.03 THOUSANDS/ΜL (ref 0–0.1)
BASOPHILS NFR BLD AUTO: 1 % (ref 0–1)
BUN SERPL-MCNC: 49 MG/DL (ref 5–25)
CALCIUM SERPL-MCNC: 9 MG/DL (ref 8.3–10.1)
CHLORIDE SERPL-SCNC: 113 MMOL/L (ref 100–108)
CO2 SERPL-SCNC: 22 MMOL/L (ref 21–32)
CREAT SERPL-MCNC: 3.29 MG/DL (ref 0.6–1.3)
EOSINOPHIL # BLD AUTO: 0.04 THOUSAND/ΜL (ref 0–0.61)
EOSINOPHIL NFR BLD AUTO: 1 % (ref 0–6)
ERYTHROCYTE [DISTWIDTH] IN BLOOD BY AUTOMATED COUNT: 14.2 % (ref 11.6–15.1)
GFR SERPL CREATININE-BSD FRML MDRD: 13 ML/MIN/1.73SQ M
GLUCOSE P FAST SERPL-MCNC: 99 MG/DL (ref 65–99)
HCT VFR BLD AUTO: 32.7 % (ref 34.8–46.1)
HGB BLD-MCNC: 10.4 G/DL (ref 11.5–15.4)
IMM GRANULOCYTES # BLD AUTO: 0.02 THOUSAND/UL (ref 0–0.2)
IMM GRANULOCYTES NFR BLD AUTO: 0 % (ref 0–2)
INR PPP: 1.27 (ref 0.84–1.19)
LYMPHOCYTES # BLD AUTO: 1.1 THOUSANDS/ΜL (ref 0.6–4.47)
LYMPHOCYTES NFR BLD AUTO: 24 % (ref 14–44)
MCH RBC QN AUTO: 30.1 PG (ref 26.8–34.3)
MCHC RBC AUTO-ENTMCNC: 31.8 G/DL (ref 31.4–37.4)
MCV RBC AUTO: 95 FL (ref 82–98)
MONOCYTES # BLD AUTO: 0.4 THOUSAND/ΜL (ref 0.17–1.22)
MONOCYTES NFR BLD AUTO: 9 % (ref 4–12)
NEUTROPHILS # BLD AUTO: 3.04 THOUSANDS/ΜL (ref 1.85–7.62)
NEUTS SEG NFR BLD AUTO: 65 % (ref 43–75)
NRBC BLD AUTO-RTO: 0 /100 WBCS
PHOSPHATE SERPL-MCNC: 4.2 MG/DL (ref 2.3–4.1)
PLATELET # BLD AUTO: 344 THOUSANDS/UL (ref 149–390)
PMV BLD AUTO: 10.3 FL (ref 8.9–12.7)
POTASSIUM SERPL-SCNC: 4.4 MMOL/L (ref 3.5–5.3)
PROTHROMBIN TIME: 15.9 SECONDS (ref 11.6–14.5)
PTH-INTACT SERPL-MCNC: 271.5 PG/ML (ref 18.4–80.1)
RBC # BLD AUTO: 3.45 MILLION/UL (ref 3.81–5.12)
SODIUM SERPL-SCNC: 141 MMOL/L (ref 136–145)
WBC # BLD AUTO: 4.63 THOUSAND/UL (ref 4.31–10.16)

## 2020-12-28 PROCEDURE — 83970 ASSAY OF PARATHORMONE: CPT

## 2020-12-28 PROCEDURE — 85025 COMPLETE CBC W/AUTO DIFF WBC: CPT

## 2020-12-28 PROCEDURE — 36415 COLL VENOUS BLD VENIPUNCTURE: CPT

## 2020-12-28 PROCEDURE — 85610 PROTHROMBIN TIME: CPT

## 2020-12-28 PROCEDURE — 80069 RENAL FUNCTION PANEL: CPT

## 2020-12-29 ENCOUNTER — TELEPHONE (OUTPATIENT)
Dept: NEPHROLOGY | Facility: CLINIC | Age: 75
End: 2020-12-29

## 2020-12-29 NOTE — PRE-PROCEDURE INSTRUCTIONS
Pre-Surgery Instructions:   Medication Instructions    acetaminophen (TYLENOL) 325 mg tablet Instructed patient per Anesthesia Guidelines   aspirin (ECOTRIN LOW STRENGTH) 81 mg EC tablet Instructed patient per Anesthesia Guidelines   atorvastatin (LIPITOR) 40 mg tablet Instructed patient per Anesthesia Guidelines   calcitriol (ROCALTROL) 0 25 mcg capsule Instructed patient per Anesthesia Guidelines   citalopram (CeleXA) 20 mg tablet Instructed patient per Anesthesia Guidelines   Eliquis 2 5 MG Instructed patient per Anesthesia Guidelines   levothyroxine 75 mcg tablet Instructed patient per Anesthesia Guidelines   loperamide (IMODIUM) 2 mg capsule Instructed patient per Anesthesia Guidelines   melatonin 3 mg Instructed patient per Anesthesia Guidelines   metoprolol tartrate (LOPRESSOR) 25 mg tablet Instructed patient per Anesthesia Guidelines   ruxolitinib (Jakafi) 10 mg tablet Instructed patient per Anesthesia Guidelines   saccharomyces boulardii (FLORASTOR) 250 mg capsule Instructed patient per Anesthesia Guidelines  WILL TAKE JAKAFI, METOPR, CITAL, LEVOTHYR DOS SM SIP H2O  INSTR  ON ASC LOC  ,BRING PHOTO ID/MED LIST/INS  INFO , SHOWER REV , NO ASA/NSAIDS/VIT 1 WEEK PREOP  Pre Procedure Consult Calls    1  Are you currently experiencing symptoms of fever >100 4, cough, or shortness of breath or sore throat? ______YES    ____X_NO   If yes, then please call your PCP immediately for further direction  If you are completing this form on site, please find the safest and most direct route to the nearest exit and avoid close contact with others until you can get further advice from your PCP  2  Have you recently traveled to any foreign country or area within the [place] that has reported cases of COVID-19? ______YES      __X___NO   IF YES, LIST LOCATION(S): __________________________   3   Have you recently been in contact with someone who is a suspected or confirmed case of COVID-19?   ______ YES   ____X__ No   INSTRUCTED ON NEW COVID VISITOR POLICY- ONLY 1 VISITOR, ALL MUST WEAR MASKS, TEMP WILL BE TAKEN @ DOOR  Med Instructions Troubleshoot   Acetaminophen Med Class    Continue to take this medication on your normal schedule  If this is an oral medication and you take it in the morning, then you may take this medicine with a sip of water  Antidepressant Med Class    Continue to take this medication on your normal schedule  If this is an oral medication and you take it in the morning, then you may take this medicine with a sip of water  ASA Med Class: Aspirin    Should be discontinued at least one week prior to planned operation, unless specifically stated otherwise by surgical service  Your Surgeon may have patient stop taking aspirin up to a week before surgery if having intracranial, middle ear, posterior eye, spine surgery or prostate surgery  [Patients taking aspirin for coronary stents should be reviewed by an anesthesiologist in the optimization clinic  Please do not discontinue aspirin in patients with coronary stents unless given specific permission to do so by the cardiologist who prescribed medication ]   If your surgeon approves please continue to take this medication on your normal schedule  You may take this medication on the morning of your surgery with a sip of water  Beta blocker Med Class    Continue to take this heart medication on your normal schedule  If this is an oral medication and you take it in the morning, then you may take this medicine with a sip of water  Direct Xa Inhibitor Med Class    Stop taking this medication at least 3 days prior to surgery/procedure with prescribing Physician and Surgeon consultation  Herbal Med Class    Stop taking this herbal medications at least one week prior to surgery/procedure  Statin Med Class    Continue to take this medication on your normal schedule    If this is an oral medication and you take it in the morning, then you may take this medicine with a sip of water  Thyroxine Med Class    Continue to take this medication on your normal schedule  If this is an oral medication and you take it in the morning, then you may take this medicine with a sip of water  Vitamin Med Class    You may continue to take any vitamin that your surgeon has prescribed to you up to the day before surgery  If your surgeon has not specifically prescribed this vitamin or instructed you to continue then stop taking 7 days prior to surgery

## 2020-12-30 ENCOUNTER — HOSPITAL ENCOUNTER (OUTPATIENT)
Dept: INFUSION CENTER | Facility: HOSPITAL | Age: 75
End: 2020-12-30
Attending: INTERNAL MEDICINE

## 2021-01-04 ENCOUNTER — TELEPHONE (OUTPATIENT)
Dept: HEMATOLOGY ONCOLOGY | Facility: CLINIC | Age: 76
End: 2021-01-04

## 2021-01-04 NOTE — TELEPHONE ENCOUNTER
[facility] calling to update on Specialty Pharmacy information  [facility] will now be the filling Specialty Pharmacy for patients Farzana Alexander is no longer in network with the patients plan    [facility]   [address]     Will send to oncology finance to update and confirm this is the correct pharmacy to [facility]

## 2021-01-05 ENCOUNTER — DOCUMENTATION (OUTPATIENT)
Dept: HEMATOLOGY ONCOLOGY | Facility: CLINIC | Age: 76
End: 2021-01-05

## 2021-01-05 ENCOUNTER — HOSPITAL ENCOUNTER (OUTPATIENT)
Facility: HOSPITAL | Age: 76
Setting detail: OUTPATIENT SURGERY
Discharge: HOME | End: 2021-01-05
Attending: SURGERY | Admitting: SURGERY
Payer: COMMERCIAL

## 2021-01-05 ENCOUNTER — ANESTHESIA (OUTPATIENT)
Dept: PERIOP | Facility: HOSPITAL | Age: 76
End: 2021-01-05
Payer: COMMERCIAL

## 2021-01-05 VITALS
RESPIRATION RATE: 18 BRPM | OXYGEN SATURATION: 100 % | TEMPERATURE: 96 F | WEIGHT: 200 LBS | HEART RATE: 55 BPM | HEIGHT: 60 IN | BODY MASS INDEX: 39.27 KG/M2 | SYSTOLIC BLOOD PRESSURE: 137 MMHG | DIASTOLIC BLOOD PRESSURE: 81 MMHG

## 2021-01-05 VITALS — HEART RATE: 74 BPM

## 2021-01-05 DIAGNOSIS — D45 POLYCYTHEMIA VERA (HCC): ICD-10-CM

## 2021-01-05 DIAGNOSIS — N18.5 STAGE 5 CHRONIC KIDNEY DISEASE NOT ON CHRONIC DIALYSIS (HCC): Primary | ICD-10-CM

## 2021-01-05 PROCEDURE — 99024 POSTOP FOLLOW-UP VISIT: CPT | Performed by: SURGERY

## 2021-01-05 PROCEDURE — 36821 AV FUSION DIRECT ANY SITE: CPT | Performed by: SURGERY

## 2021-01-05 RX ORDER — OXYCODONE HYDROCHLORIDE AND ACETAMINOPHEN 5; 325 MG/1; MG/1
1 TABLET ORAL EVERY 6 HOURS PRN
Qty: 10 TABLET | Refills: 0 | Status: SHIPPED | OUTPATIENT
Start: 2021-01-05 | End: 2021-01-15

## 2021-01-05 RX ORDER — SODIUM CHLORIDE, SODIUM LACTATE, POTASSIUM CHLORIDE, CALCIUM CHLORIDE 600; 310; 30; 20 MG/100ML; MG/100ML; MG/100ML; MG/100ML
50 INJECTION, SOLUTION INTRAVENOUS CONTINUOUS
Status: DISCONTINUED | OUTPATIENT
Start: 2021-01-05 | End: 2021-01-05 | Stop reason: HOSPADM

## 2021-01-05 RX ORDER — PROPOFOL 10 MG/ML
INJECTION, EMULSION INTRAVENOUS CONTINUOUS PRN
Status: DISCONTINUED | OUTPATIENT
Start: 2021-01-05 | End: 2021-01-05

## 2021-01-05 RX ORDER — ONDANSETRON 2 MG/ML
INJECTION INTRAMUSCULAR; INTRAVENOUS AS NEEDED
Status: DISCONTINUED | OUTPATIENT
Start: 2021-01-05 | End: 2021-01-05

## 2021-01-05 RX ORDER — FENTANYL CITRATE 50 UG/ML
INJECTION, SOLUTION INTRAMUSCULAR; INTRAVENOUS AS NEEDED
Status: DISCONTINUED | OUTPATIENT
Start: 2021-01-05 | End: 2021-01-05

## 2021-01-05 RX ORDER — SODIUM CHLORIDE, SODIUM LACTATE, POTASSIUM CHLORIDE, CALCIUM CHLORIDE 600; 310; 30; 20 MG/100ML; MG/100ML; MG/100ML; MG/100ML
30 INJECTION, SOLUTION INTRAVENOUS CONTINUOUS
Status: DISCONTINUED | OUTPATIENT
Start: 2021-01-05 | End: 2021-01-05 | Stop reason: HOSPADM

## 2021-01-05 RX ORDER — LIDOCAINE HYDROCHLORIDE 10 MG/ML
0.5 INJECTION, SOLUTION EPIDURAL; INFILTRATION; INTRACAUDAL; PERINEURAL ONCE AS NEEDED
Status: DISCONTINUED | OUTPATIENT
Start: 2021-01-05 | End: 2021-01-05 | Stop reason: HOSPADM

## 2021-01-05 RX ORDER — CEFAZOLIN SODIUM 2 G/50ML
2000 SOLUTION INTRAVENOUS ONCE
Status: COMPLETED | OUTPATIENT
Start: 2021-01-05 | End: 2021-01-05

## 2021-01-05 RX ORDER — LIDOCAINE HYDROCHLORIDE 10 MG/ML
INJECTION, SOLUTION EPIDURAL; INFILTRATION; INTRACAUDAL; PERINEURAL AS NEEDED
Status: DISCONTINUED | OUTPATIENT
Start: 2021-01-05 | End: 2021-01-05

## 2021-01-05 RX ORDER — OXYCODONE HYDROCHLORIDE AND ACETAMINOPHEN 5; 325 MG/1; MG/1
1 TABLET ORAL EVERY 4 HOURS PRN
Status: DISCONTINUED | OUTPATIENT
Start: 2021-01-05 | End: 2021-01-05 | Stop reason: HOSPADM

## 2021-01-05 RX ORDER — FENTANYL CITRATE/PF 50 MCG/ML
25 SYRINGE (ML) INJECTION
Status: DISCONTINUED | OUTPATIENT
Start: 2021-01-05 | End: 2021-01-05 | Stop reason: HOSPADM

## 2021-01-05 RX ORDER — PROPOFOL 10 MG/ML
INJECTION, EMULSION INTRAVENOUS AS NEEDED
Status: DISCONTINUED | OUTPATIENT
Start: 2021-01-05 | End: 2021-01-05

## 2021-01-05 RX ORDER — BUPIVACAINE HYDROCHLORIDE 5 MG/ML
INJECTION, SOLUTION PERINEURAL AS NEEDED
Status: DISCONTINUED | OUTPATIENT
Start: 2021-01-05 | End: 2021-01-05 | Stop reason: HOSPADM

## 2021-01-05 RX ORDER — ONDANSETRON 2 MG/ML
4 INJECTION INTRAMUSCULAR; INTRAVENOUS ONCE AS NEEDED
Status: DISCONTINUED | OUTPATIENT
Start: 2021-01-05 | End: 2021-01-05 | Stop reason: HOSPADM

## 2021-01-05 RX ORDER — HEPARIN SODIUM 1000 [USP'U]/ML
INJECTION, SOLUTION INTRAVENOUS; SUBCUTANEOUS AS NEEDED
Status: DISCONTINUED | OUTPATIENT
Start: 2021-01-05 | End: 2021-01-05

## 2021-01-05 RX ORDER — EPHEDRINE SULFATE 50 MG/ML
INJECTION INTRAVENOUS AS NEEDED
Status: DISCONTINUED | OUTPATIENT
Start: 2021-01-05 | End: 2021-01-05

## 2021-01-05 RX ORDER — GLYCOPYRROLATE 0.2 MG/ML
INJECTION INTRAMUSCULAR; INTRAVENOUS AS NEEDED
Status: DISCONTINUED | OUTPATIENT
Start: 2021-01-05 | End: 2021-01-05

## 2021-01-05 RX ADMIN — PROPOFOL 40 MCG/KG/MIN: 10 INJECTION, EMULSION INTRAVENOUS at 11:45

## 2021-01-05 RX ADMIN — PROPOFOL 120 MG: 10 INJECTION, EMULSION INTRAVENOUS at 10:50

## 2021-01-05 RX ADMIN — CEFAZOLIN SODIUM 2000 MG: 2 SOLUTION INTRAVENOUS at 10:49

## 2021-01-05 RX ADMIN — FENTANYL CITRATE 25 MCG: 50 INJECTION INTRAMUSCULAR; INTRAVENOUS at 10:50

## 2021-01-05 RX ADMIN — GLYCOPYRROLATE 0.1 MG: 0.2 INJECTION, SOLUTION INTRAMUSCULAR; INTRAVENOUS at 11:13

## 2021-01-05 RX ADMIN — EPHEDRINE SULFATE 10 MG: 50 INJECTION, SOLUTION INTRAVENOUS at 11:05

## 2021-01-05 RX ADMIN — HEPARIN SODIUM 3000 UNITS: 1000 INJECTION INTRAVENOUS; SUBCUTANEOUS at 11:40

## 2021-01-05 RX ADMIN — ONDANSETRON 4 MG: 2 INJECTION INTRAMUSCULAR; INTRAVENOUS at 12:24

## 2021-01-05 RX ADMIN — GLYCOPYRROLATE 0.1 MG: 0.2 INJECTION, SOLUTION INTRAMUSCULAR; INTRAVENOUS at 11:01

## 2021-01-05 RX ADMIN — FENTANYL CITRATE 25 MCG: 50 INJECTION INTRAMUSCULAR; INTRAVENOUS at 12:12

## 2021-01-05 RX ADMIN — PROPOFOL 100 MG: 10 INJECTION, EMULSION INTRAVENOUS at 11:22

## 2021-01-05 RX ADMIN — EPHEDRINE SULFATE 10 MG: 50 INJECTION, SOLUTION INTRAVENOUS at 10:58

## 2021-01-05 RX ADMIN — LIDOCAINE HYDROCHLORIDE 90 MG: 10 INJECTION, SOLUTION EPIDURAL; INFILTRATION; INTRACAUDAL; PERINEURAL at 10:50

## 2021-01-05 RX ADMIN — SODIUM CHLORIDE, SODIUM LACTATE, POTASSIUM CHLORIDE, AND CALCIUM CHLORIDE: .6; .31; .03; .02 INJECTION, SOLUTION INTRAVENOUS at 10:43

## 2021-01-05 RX ADMIN — PHENYLEPHRINE HYDROCHLORIDE 20 MCG/MIN: 10 INJECTION INTRAVENOUS at 11:05

## 2021-01-05 RX ADMIN — FENTANYL CITRATE 25 MCG: 50 INJECTION INTRAMUSCULAR; INTRAVENOUS at 11:24

## 2021-01-05 RX ADMIN — FENTANYL CITRATE 25 MCG: 50 INJECTION INTRAMUSCULAR; INTRAVENOUS at 10:52

## 2021-01-05 RX ADMIN — PROPOFOL 50 MG: 10 INJECTION, EMULSION INTRAVENOUS at 10:53

## 2021-01-05 RX ADMIN — SODIUM CHLORIDE, SODIUM LACTATE, POTASSIUM CHLORIDE, AND CALCIUM CHLORIDE 30 ML/HR: .6; .31; .03; .02 INJECTION, SOLUTION INTRAVENOUS at 09:55

## 2021-01-05 RX ADMIN — EPHEDRINE SULFATE 10 MG: 50 INJECTION, SOLUTION INTRAVENOUS at 11:37

## 2021-01-05 NOTE — H&P
Assessment/Plan:     Pt is a 78 yo F w/ bladder surgery '16, c/b incarcerated parastomal hernia s/p ex lap 8/20, recurrant SBO, hypothyroidism, hyperparathyroidism, hx of PE '06 (on ppx dose eliquis), HTN, meningioma, MDD, polycythemia vera, hx of SDH after fall?, CKD, presents for dialysis access evaluation      Stage 5 chronic kidney disease not on chronic dialysis [facility])  -     Ambulatory referral to Vascular Surgery  -     Case request operating room: Creation of right brachiobasilic fistula vs graft; Standing  -     Basic metabolic panel; Future  -     CBC and Platelet; Future  -     Protime-INR; Future  -     Ambulatory referral to Cardiology; Future  -     Case request operating room: Creation of right brachiobasilic fistula vs graft  -reviewed vein mapping which shows chronic SVT in the B cephalic veins just above the AC fossa; L basilic is inadequate size; R basilic appears adequate size in the upper arm; brachial bifur at the [facility] fossa  -discussed options for dialysis access including catheter, fistula, graft, PD and the risks and benefits of each; in this patient with frequent infections, avoiding prosthetic is particularly important; unfortunately, her only fistula option appears to be the right basilic vein; discussed risks of surgery including bleeding, infection, steal, nerve injury, failure to mature, need for additional procedures; will plan two stage procedure; discussed timeline with pt and son; all questions answered  -plan for creation of right brachiobasilic fistula vs graft (if vein inadequate)  -labs  -cards clearance (Dr Holly Thao): stress with small reversible defect; plan for medical management; placed at "acceptable risk"  -last dose eliquis on sat     Other orders  -     Diet NPO; Sips with meds; Standing  -     Void on call to OR; Standing  -     Insert peripheral IV; Standing  -     Nursing Communication CHG bath, have staff wash entire body (neck down) per pre op bathing protocol   Routine, evening prior to, and day of surgery ; Standing  -     Nursing Communication Swab both nares with Povidone-Iodine solution, EXCLUDE if patient has shellfish/Iodine allergy  Routine, day of surgery, on call to OR ; Standing  -     Place sequential compression device; Standing  -     ceFAZolin (ANCEF) 2,000 mg in dextrose 5 % 100 mL IVPB      Operative Scheduling Information:     Hospital:  [facility]     Physician: Me     Surgery: creation R brachiobasilic fistula vs graft     Urgency:  Standard     Level:  Level 3: Outpatients to be scheduled for elective surgery than can be delayed up to 4 weeks without reasonable expectation of detriment to patient     Case Length:  Normal     Post-op Bed:  Outpatient     OR Table:  Standard     Equipment Needs:  None     Medication Instructions:  Eliquis:  Hold for 2 days prior to procedure     Hydration:  No        Subjective:       Patient ID: Albertina Gonsalez is a 77 y o  female        HPI:     Patient presents for creation of dialysis access  Referred by Dr Simms Situ  R-handed      CKD, not yet on HD  Had LUANN with recent admission      Had bladder surgery originally for urinary incontinance '16  Presented recently with incarcerated parastomal hernia s/p ex-lap, reduction and parastomal hernia repair with Phasix mesh  Has had prolonged recovery from this  Is living at home now, doing home PT  Walking with cane/walker      Denies CP or cardiac history  Not established with cards  Complains of SOB with activity         The following portions of the patient's history were reviewed and updated as appropriate: allergies, current medications, past family history, past medical history, past social history, past surgical history and problem list      Review of Systems   Constitutional: Positive for activity change, appetite change and fatigue  Negative for chills and fever  HENT: Positive for rhinorrhea and sneezing   Negative for ear pain, hearing loss, sinus pain, sore throat and trouble swallowing  Eyes: Negative  Negative for pain and visual disturbance  Respiratory: Positive for shortness of breath (with activity)  Negative for cough and chest tightness  Cardiovascular: Negative  Negative for chest pain and leg swelling  Gastrointestinal: Positive for constipation and diarrhea  Negative for nausea and vomiting  Endocrine: Negative  Genitourinary: Negative  Negative for difficulty urinating and frequency  Musculoskeletal: Positive for back pain and gait problem  Skin: Negative  Negative for wound  Allergic/Immunologic: Negative  Neurological: Positive for dizziness (sometimes when she goes from a sitting to standing position) and light-headedness  Negative for speech difficulty, weakness, numbness and headaches  Hematological: Bruises/bleeds easily  Psychiatric/Behavioral: Positive for agitation and hallucinations  Negative for self-injury  The patient is nervous/anxious  Depression          Objective:        BP (!) 161/101   Pulse 64   Temp (!) 97 3 °F (36 3 °C) (Tympanic)   Resp 16   Ht 5' (1 524 m)   Wt 90 7 kg (200 lb)   SpO2 99%   BMI 39 06 kg/m²            Physical Exam  Vitals signs and nursing note reviewed  Constitutional:       Appearance: She is well-developed  HENT:      Head: Normocephalic and atraumatic  Eyes:      Conjunctiva/sclera: Conjunctivae normal    Neck:      Musculoskeletal: Normal range of motion and neck supple  Cardiovascular:      Rate and Rhythm: Normal rate and regular rhythm  Pulses:           Radial pulses are 2+ on the right side and 2+ on the left side  Dorsalis pedis pulses are 2+ on the right side and 2+ on the left side  Posterior tibial pulses are 0 on the right side and 0 on the left side  Heart sounds: Normal heart sounds  No murmur  Comments: No carotid bruits B  Pulmonary:      Effort: Pulmonary effort is normal  No respiratory distress  Breath sounds: Normal breath sounds  No wheezing or rales  Abdominal:      General: There is no distension  Palpations: Abdomen is soft  Tenderness: There is no abdominal tenderness  There is no rebound  Musculoskeletal: Normal range of motion  Right lower le+ Edema present  Left lower le+ Edema present  Skin:     General: Skin is warm and dry  Neurological:      Mental Status: She is alert and oriented to person, place, and time     Psychiatric:         Behavior: Behavior normal            Vitals:    20 0904 21 0908 21 0920   BP:  (!) 161/101    Pulse:  64    Resp:  16    Temp:  (!) 97 3 °F (36 3 °C)    TempSrc:  Tympanic    SpO2:  99%    Weight: 90 7 kg (200 lb)  90 7 kg (200 lb)   Height:   5' (1 524 m)       Patient Active Problem List   Diagnosis    Chronic saddle pulmonary embolism (HCC)    Bladder mass    Small bowel obstruction (HCC)    Obstructive uropathy    Secondary hyperparathyroidism of renal origin ([facility] )    Hypothyroidism    Hypertension    Polycythemia vera ([address] )    Fall    Subdural hematoma, acute (HCC)    Intraventricular hemorrhage (HCC)    Diarrhea    Recurrent Clostridium difficile diarrhea    Anemia in CKD (chronic kidney disease)    Mass of urethra    Stage 5 chronic kidney disease not on chronic dialysis (HCC)    Thoracic compression fracture (HCC)    Hematuria    Abnormal finding on MRI of brain    Benign neoplasm of supratentorial region of brain ([address] )    Meningioma ([address] )    UTI (urinary tract infection)    Herpes zoster    Inverted T wave    Polycythemia    Encounter for surgical aftercare following surgery on the digestive system    History of Clostridioides difficile colitis    History of shingles    Depression    Adult BMI 39 0-39 9 kg/sq m    Chronic thrombosis of subclavian vein (HCC)       Past Surgical History:   Procedure Laterality Date    ABDOMINAL ADHESION SURGERY N/A 2020    Procedure: LYSIS ADHESIONS;  Surgeon: Bryon Faith DO;  [facility];  Service: General    BLADDER SUSPENSION      BOTOX INJECTION N/A 7/27/2016    Procedure: BOTOX INJECTION ;  Surgeon: Fifi Chatman MD;  [facility];  Service:    [address], RADICAL WITH ILEOCONDUIT N/A 10/4/2016    Procedure: Gearl Reasoner WITH ILEAL CONDUIT ;  Surgeon: Fifi Chatman MD;  [facility];  Service:    [facility] CYSTOSCOPY W/ RETROGRADES Bilateral 7/27/2016    Procedure: Tayo Kingsley; RETROGRADE PYELOGRAM ;  Surgeon: Fifi Chatman MD;  [facility];  Service:    [address]      IR NON-TUNNELED CENTRAL LINE PLACEMENT  7/17/2020    IR NON-TUNNELED CENTRAL LINE PLACEMENT  8/14/2020    LAPAROTOMY N/A 8/9/2020    Procedure: LAPAROTOMY EXPLORATORY, PARASTOMAL HERNIA REPAIR WITH MESH;  Surgeon: Bryon Faith DO;  [facility];  Service: General    NE COLONOSCOPY FLX DX W/COLLJ SPEC WHEN PFRMD N/A 8/31/2016    Procedure: COLONOSCOPY;  Surgeon: Amirah Cross MD;  Location: [facility]; Service: Gastroenterology    NE CYSTOSCOPY,INSERT URETERAL STENT Bilateral 7/27/2016    Procedure: STENT INSERTION; EXCISION OF MESH ;  Surgeon: Fifi Chatman MD;  [facility];  Service: Urology    TONSILLECTOMY      TUBAL LIGATION      WISDOM TOOTH EXTRACTION         Family History   Problem Relation Age of Onset    Cancer Mother         small cell cancer     Heart disease Father     COPD Father     Heart disease Brother     Nephrolithiasis Brother        Social History     Socioeconomic History    Marital status:       Spouse name: Not on file    Number of children: Not on file    Years of education: Not on file    Highest education level: Not on file   Occupational History    Not on file   Social Needs    Financial resource strain: Not on file    Food insecurity     Worry: Not on file     Inability: Not on file   [facility] Transportation needs     Medical: Not on file     Non-medical: Not on file   Tobacco Use    Smoking status: Never Smoker    Smokeless tobacco: Never Used    Tobacco comment: n/a   Substance and Sexual Activity    Alcohol use: Not Currently     Frequency: Never     Binge frequency: Never     Comment: n/s    Drug use: Not Currently     Comment: n/a    Sexual activity: Not Currently     Partners: Male   Lifestyle    Physical activity     Days per week: Not on file     Minutes per session: Not on file    Stress: Not on file   Relationships    Social connections     Talks on phone: Not on file     Gets together: Not on file     Attends Church service: Not on file     Active member of club or organization: Not on file     Attends meetings of clubs or organizations: Not on file     Relationship status: Not on file    Intimate partner violence     Fear of current or ex partner: Not on file     Emotionally abused: Not on file     Physically abused: Not on file     Forced sexual activity: Not on file   Other Topics Concern    Not on file   Social History Narrative    Not on file       Allergies   Allergen Reactions    Chlorhexidine Rash     petichi like rash when using chlorhexidine swabs prior to IV            Current Facility-Administered Medications:     ceFAZolin (ANCEF) 1,000 mg in sodium chloride 0 9 % 1,000 mL irrigation bottle, , Irrigation, Once, Tressia Merlin, MD    ceFAZolin (ANCEF) IVPB (premix in dextrose) 2,000 mg 50 mL, 2,000 mg, Intravenous, Once, Art Chairez MD    heparin (porcine) 2,000 Units, papaverine 60 mg in multi-electrolyte (PLASMALYTE-A/ISOLYTE-S PH 7 4) 500 mL irrigation, , Irrigation, Once, Art Chairez MD    lactated ringers infusion, 30 mL/hr, Intravenous, Continuous, Joe Lerner MD, Last Rate: 30 mL/hr at 01/05/21 0955, 30 mL/hr at 01/05/21 0955    lidocaine (PF) (XYLOCAINE-MPF) 1 % injection 0 5 mL, 0 5 mL, Infiltration, Once PRN, Joe Lerner MD

## 2021-01-05 NOTE — PROGRESS NOTES
For patient Ramu Walker  [birth year]    Patient has been enrolled with the [employer] Financial assistance program  ID# [id number]  BIN#  [id number]  PCN#  [id number]  GRP#  [id number]  AMT [id number]  Copay $0 00    EPIC NOTED, EMAIL TO TEAM/PHARMACY

## 2021-01-05 NOTE — ANESTHESIA PREPROCEDURE EVALUATION
Procedure:  Creation of right brachiobasilic fistula vs graft (Right Arm Upper)    Relevant Problems   ANESTHESIA (within normal limits)      CARDIO   (+) Hypertension      ENDO   (+) Hypothyroidism   (+) Secondary hyperparathyroidism of renal origin ([facility] )      GI/HEPATIC   (+) Small bowel obstruction (HCC)   (-) Gastroesophageal reflux disease      /RENAL   (+) Stage 5 chronic kidney disease not on chronic dialysis (HCC)      HEMATOLOGY  remote hx PE, on anticoagulation   (+) Anemia in CKD (chronic kidney disease)      NEURO/PSYCH   (+) Depression      PULMONARY (within normal limits)   (-) Sleep apnea   (-) Smoking   (-) URI (upper respiratory infection)      Other   (+) Adult BMI 39 0-39 9 kg/sq m   (+) Polycythemia vera (HCC)      Physical Exam    Airway    Mallampati score: III  TM Distance: >3 FB  Neck ROM: full     Dental   upper dentures and lower dentures,     Cardiovascular      Pulmonary      Other Findings       Lab Results   Component Value Date    WBC 4 63 12/28/2020    HGB 10 4 (L) 12/28/2020     12/28/2020     Lab Results   Component Value Date    SODIUM 141 12/28/2020    K 4 4 12/28/2020    BUN 49 (H) 12/28/2020    CREATININE 3 29 (H) 12/28/2020    EGFR 13 12/28/2020    GLUCOSE 138 10/04/2016     Lab Results   Component Value Date    INR 1 27 (H) 12/28/2020     Lab Results   Component Value Date    HGBA1C 5 5 09/02/2020     Anesthesia Plan  ASA Score- 3     Anesthesia Type- general with ASA Monitors  Additional Monitors:   Airway Plan: LMA  Plan Factors-    Chart reviewed  Existing labs reviewed  Patient summary reviewed  Patient is not a current smoker  Induction- intravenous  Postoperative Plan-     Informed Consent- Anesthetic plan and risks discussed with patient and son  I personally reviewed this patient with the CRNA  Discussed and agreed on the Anesthesia Plan with the CRNA  Payam Hester

## 2021-01-05 NOTE — DISCHARGE INSTRUCTIONS
Please restart your Eliquis tomorrow AM 1/6/21                DISCHARGE INSTRUCTIONS  DIALYSIS FISTULA SURGERY    Following discharge from the hospital, you may have some questions about your operation, your activities or your general condition  These instructions may answer some of your questions and help you adjust during the first few weeks following your operation  ACTIVITY:  Limit use of the operated arm to what is absolutely necessary for the first day after surgery  On the second day after surgery, you may start to increase use of your arm as tolerated  One week after surgery, you should start to exercise your hand on the side of the graft by squeezing a ball  This increases blood flow in your graft and arm so your graft will function better  DIET:   Resume your normal diet  Try to eat low fat and low cholesterol foods  DRESSING:   The dressing may be removed on the third day following surgery  INCISION:   You may include the operated area in a shower on the fourth day following surgery  It is normal to have some pain at the surgical site  You will receive a prescription for pain medicine at the time of discharge  There will also be some swelling and bruising around the surgical site  If increasing redness or pain develops at the incision or severe pain, numbness or weakness occurs in the hand, call our office immediately  Numbness in the region of the incision may occur following the surgery  This normally resolves in six to twelve months  ARM SWELLING:    Most patients have some noticeable arm swelling after surgery  This usually disappears within a few weeks  If swelling is present, elevate the arm whenever possible  RESTRICTIONS:   Do not have blood draws, IVs, or blood pressures performed on the operated arm  FISTULA USE:    Your fistula will not be used for six to 12 weeks      PLEASE CALL THE OFFICE IF YOU HAVE ANY QUESTIONS  [phone]  [phone]  TOLL FREE [phone]  [address] [facility] , [address], [address], [facility]  [address], [address], [facility], [address] [facility] [address]  [address], [place], [address], [address]  [address], [address], [address]  [facility], [address] [facility], [address] [facility], [address]   [address] [facility], [address], [facility], [address] [facility]  [address] [facility], [address] [facility], [facility], [address], [address]    Arteriovenous Fistula Creation for Hemodialysis   WHAT YOU NEED TO KNOW:   What do I need to know about an arteriovenous fistula (AVF) creation? An AVF creation is surgery to connect an artery to a vein  This surgery is done so you can receive hemodialysis  The AVF is usually placed in your forearm or upper arm  How do I prepare for an AVF creation? · Your surgeon will talk to you about how to prepare for surgery  You may need an ultrasound of your arm before surgery  An ultrasound will help your surgeon decide which artery and vein he will use to create your AVF  You may need to stop taking blood thinners 1 week before surgery  · Your surgeon may tell you not to eat or drink anything after midnight on the day of your surgery  He will tell you what medicines to take or not take on the day of your surgery  You may be given an antibiotic through your IV to help prevent a bacterial infection  Tell a healthcare provider if you have ever had an allergic reaction to an antibiotic  Ask someone to drive you home and stay with you after surgery  What will happen during an AVF creation? You may be given general anesthesia to keep you asleep and free from pain during surgery  You may instead be given local or regional anesthesia to numb the surgery area  With local or regional anesthesia, you may still feel pressure or pushing during surgery, but you should not feel any pain  Your surgeon will make an incision in your arm   He will connect your artery and vein with stitches  Your incision will be closed with stitches and covered with a bandage  What will happen after an AVF creation? · Healthcare providers will monitor you until you are awake  They will feel the area over your AVF for a thrill, and listen for a bruit  A thrill is a vibration, and a bruit is a humming noise  The presence of a bruit and a thrill mean that blood is moving through your AVF properly  A healthcare provider will show you how to feel for a thrill  · Your arm may feel sore for several days after your surgery  You may have mild bruising or swelling near your wound  Your wound may drain a few drops of blood or pink fluid for 24 hours  Your AVF will take 2 to 3 months to heal  After this time, it can be used for hemodialysis  What are the risks of an AVF creation? You may bleed more than expected or get an infection  Your AVF may become narrowed or blocked  This may slow or stop blood flow through your AVF, or to your arm or hand  You may need surgery to fix this or create another AVF  You may get a blood clot in your arm or leg  This may become life-threatening  CARE AGREEMENT:   You have the right to help plan your care  Learn about your health condition and how it may be treated  Discuss treatment options with your healthcare providers to decide what care you want to receive  You always have the right to refuse treatment  The above information is an  only  It is not intended as medical advice for individual conditions or treatments  Talk to your doctor, nurse or pharmacist before following any medical regimen to see if it is safe and effective for you  © Copyright [address] Information is for End User's use only and may not be sold, redistributed or otherwise used for commercial purposes   All illustrations and images included in CareNotes® are the copyrighted property of [employer]  or [address] [place], [place] [address]id Bronzepape

## 2021-01-05 NOTE — OP NOTE
OPERATIVE REPORT  PATIENT NAME: David Dias    :  [id number]  MRN: [id number]  Pt Location:  OR ROOM 07    SURGERY DATE: 2021    Surgeon(s) and Role:     Stanton Chairez MD - Primary     * Virginia Abraham DO - Assisting     * Archana Kohler DO - Assisting    Preop Diagnosis:  Stage 5 chronic kidney disease not on chronic dialysis ([facility] [address] ) [N18 5]    Post-Op Diagnosis Codes:     * Stage 5 chronic kidney disease not on chronic dialysis ([facility] [address] ) [N18 5]    Procedure(s) (LRB):  Creation of right brachiobasilic fistula    Specimen(s):  none    Estimated Blood Loss:   20cc    Drains:  Urostomy Ileal conduit RUQ (Active)   Number of days: 1554       Anesthesia Type:   General LMA    Operative Indications:  Patient is a 78 yo F w/ CKD, progressing towards dialysis who presents for access creation    Operative Findings:  Right basilic is good quality, does branch just central to the anastomosis, becoming slightly smaller; 3mm at anastomosis  Right brachial artery is good quality and caliber for anstomosis    Complications:   None    Procedure and Technique:  After informed consent was obtained, the patient was brought to the operating room and placed in the supine position  She was given anesthesia and an LMA was placed  She was given IV antibiotics  Duplex ultrasound was used to examine the brachial artery and basilic vein and these were found to be of adequate size and quality  She was prepped and draped in the usual sterile fashion, exposing the right arm circumferentially  A timeout was performed  A transverse incision was made at the medial aspect of the antecubital fossa  Cautery was used to dissect through the soft tissue  The basilic vein was identified and freed proximally and distally  The brachial artery was then identified and dissected free  Vessel loops were placed proximally and distally  3000 units of IV heparin were given    A bulldog clamp was placed on the basilic vein centrally and it was ligated and transected peripherally  It was flushed and dilated with heparinized saline  The vessel loops were pulled taught on the brachial artery and an 11-blade was used to make a longitudinal incision  This was lengthened with Barksdale scissors  The vein was cut to length and beveled  The anastomosis was created using 6-0 prolene suture in a parachute fashion, run circumferentially  Prior to completion, the vein and artery were bled and flushed  The vessel loops were then released, allowing flow through the fistula first and then distally down the arm  An excellent thrill could be felt in the fistula and a radial signal was heard  The wound was checked for hemostasis and copiously irrigated with antibiotic saline solution  It was then closed with 3-0 vicryl running suture for the subcutaneous tissue and 4-0 monocryl running suture in the subcuticular layer  The incisions were dressed with histoacryl glue  The patient was allowed to awaken and was extubated  She was transferred to the PACU for postoperative care  I was present for the entire procedure    Patient Disposition:  PACU     SIGNATURE: Teena Chairez MD  DATE: January 5, 2021  TIME: 12:28 PM    Vascular Quality Initiative - Hemodialysis Access Placement    Pre-admission Information   Functional status: Ambulatory and capable of all self care but unable to carry out any work activities  Up and about more than 50% of waking hours  ESRD: ESRD: Pre Dialysis    Historical Information      Previous Access: none    Access Type/Location: none        Procedure Information      Status: Outpatient     Side:right      Anesthesia: General     Access Type: Access Type: Surgical AVF Inflow Artery is: Brachial, Antecubital Intraoperative Artery taget diameter is: 3mm  Outflow Vein is: Basilic, Upper Arm  Intraoperative Vein target diameter is: 3mm  Anastomosis configuration is: End to Side    Cocomitant Procedure performed-: None    Completion Fistulogram: no     Preop ARTERIAL evaluation and/or treatment: duplex    Preop VENOUS evaluation and/or treatment: ultrasound mapping    *Obtain Target Diameters from study          Post op Information     Discharge Status: Home    Post op Complications: None

## 2021-01-05 NOTE — PROGRESS NOTES
1/4/2020  Received message from [facility] ( who is now the  for [facility]) called stating pt can no longer use [facility] the [facility],  They must now use [facility]    Reached out to [facility] to confirm this  Per Juan Chowdhury, they are no longer able to supply  The pt is capitated to [facility],  However, [facility] does not have access to this drug  This is [facility]      The formulary states auth is only needed for new starts  She has been on this medication since l1/6/2016 and [facility] has been paying      Notified [facility] to dc the order, requested the rx [facility], and [facility] the [facility] to [facility]

## 2021-01-05 NOTE — ANESTHESIA POSTPROCEDURE EVALUATION
Post-Op Assessment Note    CV Status:  Stable  Pain Score: 0    Pain management: adequate     Mental Status:  Arousable   Hydration Status:  Stable   PONV Controlled:  None   Airway Patency:  Patent      Post Op Vitals Reviewed: Yes      Staff: CRNA         No complications documented      BP  148/72   Temp  96 8F   Pulse  62   Resp   13   SpO2   100%

## 2021-01-07 ENCOUNTER — TELEPHONE (OUTPATIENT)
Dept: VASCULAR SURGERY | Facility: CLINIC | Age: 76
End: 2021-01-07

## 2021-01-07 NOTE — TELEPHONE ENCOUNTER
Vascular Nurse Navigator Post Op Call    Procedure: Creation of right brachiobasilic fistula     Date of Procedure: 1/5/21     Surgeon:    * Michael Hudson MD - Primary     * Krysta Rucker DO - Assisting     * Aubree Higgins DO - Assisting       Painful tingling or numbness in your fingers?: No    Paleness/Coolness in hands/fingers?: No    Redness, swelling or pus from your wound?: No    Bleeding?: No    Thrill present?: patient unsure what she was feeling for  She stated that it was difficult to try and feel for a thrill while on phone  Patient will be seeing Dr Cuauhtemoc Velasquez on 1/11 and instructed her to have him teach her how to feel for a thrill  Anticoagulation?: Aspirin and Apixaban (Eliquis)    Statin?: Lipitor (atorvastatin)    Fever/chills?: No    Uncontrolled Pain?: No      Reviewed discharge instructions and incision care with patient  Dialysis Days and Location: Currently not on dialysis    NEXT SCHEDULED OFFICE VISIT:  1/22/21 at 10 am with Dr Williams Sicard Doctor at [address]    Transportation Confirmed?: Yes        Any Questions or Concerns? Patient stated that she is having some swelling in her arm and is elevating her arm  No other complaints at this time  All questions answered  No concerns expressed at this time

## 2021-01-07 NOTE — TELEPHONE ENCOUNTER
Vascular Nurse Navigator Post Op Phone Call    Post-Discharge phone call attempted to assess patient recovery after vascular surgery I left a message on answering machine  Will attempt to contact at later date  Pt's chart was reviewed prior to call and leaving message  Procedure: Creation of right brachiobasilic fistula    Date of Procedure: 1/5/21    Surgeon:    Quentin Chairez MD - Primary     * [address] [place] [address], -[address] [place] [address], DO - Assisting     Anticoagulation pt was discharge on post op?: Aspirin and Apixaban (Eliquis)    Statin?: Lipitor (atorvastatin)    Dialysis Days and Location: Currently not on dialysis    Reminded pt of the following in message:    NEXT SCHEDULED OFFICE VISIT:  1/22/21 at 10 am with Dr Reuben Landers Doctor at The [address] [place] [address]    To contact [facility] with any concerns

## 2021-01-11 ENCOUNTER — OFFICE VISIT (OUTPATIENT)
Dept: NEPHROLOGY | Facility: CLINIC | Age: 76
End: 2021-01-11
Payer: COMMERCIAL

## 2021-01-11 VITALS
SYSTOLIC BLOOD PRESSURE: 129 MMHG | DIASTOLIC BLOOD PRESSURE: 79 MMHG | WEIGHT: 193.8 LBS | BODY MASS INDEX: 38.05 KG/M2 | HEART RATE: 74 BPM | HEIGHT: 60 IN

## 2021-01-11 DIAGNOSIS — N25.81 SECONDARY HYPERPARATHYROIDISM OF RENAL ORIGIN (HCC): ICD-10-CM

## 2021-01-11 DIAGNOSIS — N18.5 ANEMIA IN STAGE 5 CHRONIC KIDNEY DISEASE, NOT ON CHRONIC DIALYSIS (HCC): ICD-10-CM

## 2021-01-11 DIAGNOSIS — N18.5 STAGE 5 CHRONIC KIDNEY DISEASE NOT ON CHRONIC DIALYSIS (HCC): Primary | ICD-10-CM

## 2021-01-11 DIAGNOSIS — D63.1 ANEMIA IN STAGE 5 CHRONIC KIDNEY DISEASE, NOT ON CHRONIC DIALYSIS (HCC): ICD-10-CM

## 2021-01-11 DIAGNOSIS — D45 POLYCYTHEMIA VERA (HCC): ICD-10-CM

## 2021-01-11 DIAGNOSIS — N13.9 OBSTRUCTIVE UROPATHY: ICD-10-CM

## 2021-01-11 DIAGNOSIS — I10 ESSENTIAL HYPERTENSION: ICD-10-CM

## 2021-01-11 PROCEDURE — 99214 OFFICE O/P EST MOD 30 MIN: CPT | Performed by: INTERNAL MEDICINE

## 2021-01-11 NOTE — LETTER
January 11, 2021     [place] [address]    Patient: Lee Ann Vallejo   YOB: 1945   Date of Visit: 1/11/2021       Dear Dr Seferino Dill: Thank you for referring Radha Morrissey to me for evaluation  Below are my notes for this consultation  If you have questions, please do not hesitate to call me  I look forward to following your patient along with you  Sincerely,        Giana Tse MD        CC: No Recipients  Giana Tse MD  1/11/2021 10:26 AM  Incomplete  OFFICE FOLLOW UP - Nephrology   Lee Ann Vallejo 77 y o  female MRN: [id number]       ASSESSMENT and PLAN:  [place] was seen today for follow-up and chronic kidney disease  Diagnoses and all orders for this visit:    Stage 5 chronic kidney disease not on chronic dialysis [facility])  -     Renal function panel; Future  -     PTH, intact; Future  -     CBC; Future    Anemia in stage 5 chronic kidney disease, not on chronic dialysis ([address] )    Secondary hyperparathyroidism of renal origin ([facility] [address] )    Adult BMI 39 0-39 9 kg/sq m    Polycythemia vera (HCC)    Obstructive uropathy    Essential hypertension        This is a 71-year-old lady with history of chronic kidney disease stage 5 with previous creatinine in the high 2 the low 3s, history of urinary incontinence, bilateral hydronephrosis secondary to obstructive uropathy and 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 in October 2016, hospitalization with incarcerated ileal conduit parastomal hernia status post ex lap parastomal hernia repair as well as severe acute kidney injury on top of CKD with creatinine on admission on the 5s, kidney function improved and went down to the low 3s, today returns to the office for follow-up  1  Chronic kidney disease stage 5 dialysis  In the setting of obstructive uropathy, functional solitary right kidney, episode of acute kidney injury    The creatinine lately in the mid to high 3  Patient currently stable, no gross uremic symptoms  Status post AV fistula creation 1/5 by Dr Chairez  Once again discussed with patient about signs and symptoms to look for that could represent worsening kidney function at nausea, vomiting, decreased appetite, tiredness, fluid retention  I would like to see her back in the office in 8-10 weeks with repeat blood test   For now awaiting AV fistula maturation    2  Hypertension, blood pressure acceptable, follow low-salt diet, no changes on medication at this moment  3  History of nonfunctional bladder causing bilateral hydronephrosis status in 2 months with repeat labs post supra trigonal cystectomy and ileal conduit 10/2016, continue follow-up with urologist     4  Polycythemia area, previous history of PE, on Eliquis, continue follow-up with Hematology follow-up, Dr Elyn Kanner  Anemia of chronic disease  Most recent hemoglobin at goal at 10 4    5  Mineral bone disease, PTH acceptable for her degree of kidney dysfunction, continue with Calcitrol 1 tablet 3 times a week (Monday, Wednesday, Friday)     6  Hyperlipidemia, on statins by Cardiology    7  Mild metabolic acidosis, improving, most recent bicarb 22, will follow for now  8  History of PE, on Eliquis    9  Access, right brachiocephalic AV fistula created on 01/05/2021 by Dr Jones Naranjo Doctor  Patient had a follow-up appointment with vascular on 01/22        Patient Instructions   I would like to see you back in the office in 8-10 weeks with repeat blood test     continue with regular follow-up with primary care doctor and your vascular surgeon  Follow low-salt diet  Avoid NSAIDs (no ibuprofen, Motrin, Advil, Aleve, naproxen)  Okay to take Tylenol or paracetamol or acetaminophen as needed for pain    You develop any worsening symptoms of nausea, vomiting, tiredness, increased fluid retention, shortness of breath, please contact me or go to the emergency department for urgent evaluation  If not, I would like to see you back in 8-10 weeks as above  HPI: Betsy Corbin is a 77 y o  female who is here for Follow-up and Chronic Kidney Disease    Hospitalized in 10/2018 due to a small bowel obstruction that resolved spontaneously  Hospitalized early 01/2019 status post fall, found to have a left-sided subdural hematoma, creatinine on admission 3 0 that decreased to 2 5 after intravenously fluids  Hospitalized 05/2019 after status post fall complicated with T-spine compression fracture, found to have sepsis secondary to UTI, also developed acute kidney injury on top of CKD, creatinine on admission was 3 68, renal function improved and creatinine went down to 2 59 on discharge day  Last time hospitalized 08/2020 with severe acute kidney injury in the setting of incarcerated ileal conduit parastomal hernia status post ex lap and parastomal hernia repair  Renal function improved with creatinine down to the low 3s  Last time seen was 11/2020, today returns for follow up  Since last office visit patient underwent left brachiocephalic AV fistula creation on 01/05/2021  In general she is feeling about the same , tired but not significantly worsened  Denies any nausea, no vomiting, no chest pain, no shortness of breath, no significant lower extremity swelling  No abdominal pain, currently complaining of some constipation  Noticed ileal conduit urine output stable  Denies any NSAID use  ROS: All the systems were reviewed and were negative except as documented on the H&P          Allergies: Chlorhexidine    Medications:   Current Outpatient Medications:     acetaminophen (TYLENOL) 325 mg tablet, 650 mg every 6 hours as needed for mild pain or headache, Disp: 30 tablet, Rfl: 0    aspirin (ECOTRIN LOW STRENGTH) 81 mg EC tablet, Take 1 tablet (81 mg total) by mouth daily, Disp: 90 tablet, Rfl: 4    atorvastatin (LIPITOR) 40 mg tablet, Take 1 tablet (40 mg total) by mouth daily (Patient taking differently: Take 40 mg by mouth daily at bedtime ), Disp: 90 tablet, Rfl: 6    calcitriol (ROCALTROL) 0 25 mcg capsule, TAKE 1 CAPSULE BY MOUTH EVERY OTHER DAY , Disp: 15 capsule, Rfl: 5    Cholecalciferol (VITAMIN D3) 1000 UNITS CAPS, Take 1,000 unit marking on U-100 syringe by mouth daily  , Disp: , Rfl:     citalopram (CeleXA) 20 mg tablet, Take 20 mg by mouth daily , Disp: , Rfl:     Eliquis 2 5 MG, TAKE 1 TABLET BY MOUTH TWICE A DAY, Disp: 60 tablet, Rfl: 5    levothyroxine 75 mcg tablet, Take 75 mcg by mouth daily, Disp: , Rfl: 3    loperamide (IMODIUM) 2 mg capsule, Take 1 capsule (2 mg total) by mouth 4 (four) times a day as needed for diarrhea, Disp: 12 capsule, Rfl: 0    melatonin 3 mg, Take 1 tablet (3 mg total) by mouth daily at bedtime, Disp: 30 tablet, Rfl: 0    metoprolol tartrate (LOPRESSOR) 25 mg tablet, Take 1 tablet (25 mg total) by mouth 2 (two) times a day, Disp: 60 tablet, Rfl: 0    ruxolitinib (Jakafi) 10 mg tablet, Take 1 tablet (10 mg total) by mouth daily, Disp: 30 tablet, Rfl: 3    saccharomyces boulardii (FLORASTOR) 250 mg capsule, Take 1 capsule by mouth daily  , Disp: , Rfl:     oxyCODONE-acetaminophen (PERCOCET) 5-325 mg per tablet, Take 1 tablet by mouth every 6 (six) hours as needed for moderate pain for up to 10 daysMax Daily Amount: 4 tablets (Patient not taking: Reported on 1/11/2021), Disp: 10 tablet, Rfl: 0    Past Medical History:   Diagnosis Date    Anxiety     Bowel obstruction (HCC)     Chronic kidney disease (CKD), stage IV (severe) (HCC)     stage IV    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    Circulation problem     Compression fracture of cervical spine (HCC)     Hernia of abdominal cavity     History of kidney problems     Hydronephrosis     Hypertension     Incontinence     Lung mass     Improving on PET/CT 1/2016    Polycythemia vera ([facility] [address] )     Pulmonary embolism ([facility] [address] ) 2014    Shingles     Urinary tract infection      Past Surgical History:   Procedure Laterality Date    ABDOMINAL ADHESION SURGERY N/A 8/9/2020    Procedure: LYSIS ADHESIONS;  Surgeon: Stacie White DO;  [facility]: [facility];  Service: General    BLADDER SUSPENSION      BOTOX INJECTION N/A 7/27/2016    Procedure: BOTOX INJECTION ;  Surgeon: Dg Valles MD;  [facility];  Service:    [address], RADICAL WITH ILEOCONDUIT N/A 10/4/2016    Procedure: SUPRATRIGONAL CYSTECTOMY WITH ILEAL CONDUIT ;  Surgeon: Dg Valles MD;  [facility];  Service:    Shyann Lyme W/ RETROGRADES Bilateral 7/27/2016    Procedure: Kiersten Ada; RETROGRADE PYELOGRAM ;  Surgeon: Dg Valles MD;  Location[facility];  Service:    [address]      IR NON-TUNNELED CENTRAL LINE PLACEMENT  7/17/2020    IR NON-TUNNELED CENTRAL LINE PLACEMENT  8/14/2020    LAPAROTOMY N/A 8/9/2020    Procedure: LAPAROTOMY EXPLORATORY, PARASTOMAL HERNIA REPAIR WITH MESH;  Surgeon: Stacie White DO;  [facility];  Service: General    VT ANASTOMOSIS,AV,ANY SITE Right 1/5/2021    Procedure: Creation of right brachiobasilic fistula; Surgeon: Jones Chairez MD;  [facility];  Service: Vascular    VT COLONOSCOPY FLX DX W/COLLJ SPEC WHEN PFRMD N/A 8/31/2016    Procedure: COLONOSCOPY;  Surgeon: Dorinda Noriega MD;  Location: [facility]; Service: Gastroenterology    VT CYSTOSCOPY,INSERT URETERAL STENT Bilateral 7/27/2016    Procedure: STENT INSERTION; EXCISION OF MESH ;  Surgeon: Dg Valles MD;  [facility];  Service: Urology    TONSILLECTOMY      TUBAL LIGATION      WISDOM TOOTH EXTRACTION       Family History   Problem Relation Age of Onset    Cancer Mother         small cell cancer     Heart disease Father     COPD Father     Heart disease Brother     Nephrolithiasis Brother       reports that she has never smoked   She has never used smokeless tobacco  She reports previous alcohol use  She reports previous drug use  Physical Exam:   Vitals:    01/11/21 0958   BP: 129/79   BP Location: Left arm   Patient Position: Sitting   Cuff Size: Standard   Pulse: 74   Weight: 87 9 kg (193 lb 12 8 oz)   Height: 5' (1 524 m)     Body mass index is 37 85 kg/m²  General: conscious, cooperative, in not acute distress  Eyes: conjunctivae pink, anicteric sclerae  ENT: lips and mucous membranes moist  Neck: supple, no JVD  Chest: clear breath sounds bilateral, no crackles, ronchus or wheezings  CVS: distinct S1 & S2, normal rate, regular rhythm  Abdomen: obese, non-tender, non-distended, normoactive bowel sounds  Back: no CVA tenderness  Extremities: no significant edema of both legs, right brachiocephalic AV fistula with positive thrill and bruit, some edema and ecchymosis around incisional area  Skin: no rash  Neuro: awake, alert, oriented, no asterixis          Laboratory Results:  Lab Results   Component Value Date    WBC 4 63 12/28/2020    HGB 10 4 (L) 12/28/2020    HCT 32 7 (L) 12/28/2020    MCV 95 12/28/2020     12/28/2020     Lab Results   Component Value Date    SODIUM 141 12/28/2020    K 4 4 12/28/2020     (H) 12/28/2020    CO2 22 12/28/2020    BUN 49 (H) 12/28/2020    CREATININE 3 29 (H) 12/28/2020    GLUC 82 09/22/2020    CALCIUM 9 0 12/28/2020       Lab Results   Component Value Date     5 (H) 12/28/2020    CALCIUM 9 0 12/28/2020    PHOS 4 2 (H) 12/28/2020             Portions of the record may have been created with voice recognition software  Occasional wrong word or "sound a like" substitutions may have occurred due to the inherent limitations of voice recognition software  Read the chart carefully and recognize, using context, where substitutions have occurred  If you have any questions, please contact the dictating provider

## 2021-01-11 NOTE — PATIENT INSTRUCTIONS
I would like to see you back in the office in 8-10 weeks with repeat blood test     continue with regular follow-up with primary care doctor and your vascular surgeon  Follow low-salt diet  Avoid NSAIDs (no ibuprofen, Motrin, Advil, Aleve, naproxen)  Okay to take Tylenol or paracetamol or acetaminophen as needed for pain  You develop any worsening symptoms of nausea, vomiting, tiredness, increased fluid retention, shortness of breath, please contact me or go to the emergency department for urgent evaluation  If not, I would like to see you back in 8-10 weeks as above

## 2021-01-11 NOTE — PROGRESS NOTES
OFFICE FOLLOW UP - Nephrology   Yani Irving 77 y o  female MRN: [id number]       ASSESSMENT and PLAN:  Geetha Li was seen today for follow-up and chronic kidney disease  Diagnoses and all orders for this visit:    Stage 5 chronic kidney disease not on chronic dialysis [facility])  -     Renal function panel; Future  -     PTH, intact; Future  -     CBC; Future    Anemia in stage 5 chronic kidney disease, not on chronic dialysis ([facility] )    Secondary hyperparathyroidism of renal origin ([facility] )    Adult BMI 39 0-39 9 kg/sq m    Polycythemia vera (HCC)    Obstructive uropathy    Essential hypertension        This is a 72-year-old lady with history of chronic kidney disease stage 5 with previous creatinine in the high 2 the low 3s, history of urinary incontinence, bilateral hydronephrosis secondary to obstructive uropathy and 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 in October 2016, hospitalization with incarcerated ileal conduit parastomal hernia status post ex lap parastomal hernia repair as well as severe acute kidney injury on top of CKD with creatinine on admission on the 5s, kidney function improved and went down to the low 3s, today returns to the office for follow-up  1  Chronic kidney disease stage 5 dialysis  In the setting of obstructive uropathy, functional solitary right kidney, episode of acute kidney injury  The creatinine lately in the mid to high 3  Patient currently stable, no gross uremic symptoms  Status post AV fistula creation 1/5 by Dr Chairez  Once again discussed with patient about signs and symptoms to look for that could represent worsening kidney function at nausea, vomiting, decreased appetite, tiredness, fluid retention  I would like to see her back in the office in 8-10 weeks with repeat blood test   For now awaiting AV fistula maturation    2  Hypertension, blood pressure acceptable, follow low-salt diet, no changes on medication at this moment  3  History of nonfunctional bladder causing bilateral hydronephrosis status in 2 months with repeat labs post supra trigonal cystectomy and ileal conduit 10/2016, continue follow-up with urologist     4  Polycythemia area, previous history of PE, on Eliquis, continue follow-up with Hematology follow-up, Dr Stephany Beach  Anemia of chronic disease  Most recent hemoglobin at goal at 10 4    5  Mineral bone disease, PTH acceptable for her degree of kidney dysfunction, continue with Calcitrol 1 tablet 3 times a week (Monday, Wednesday, Friday)     6  Hyperlipidemia, on statins by Cardiology    7  Mild metabolic acidosis, improving, most recent bicarb 22, will follow for now  8  History of PE, on Eliquis    9  Access, right brachiocephalic AV fistula created on 01/05/2021 by Dr Vinicius Shahid Doctor  Patient had a follow-up appointment with vascular on 01/22        Patient Instructions   I would like to see you back in the office in 8-10 weeks with repeat blood test     continue with regular follow-up with primary care doctor and your vascular surgeon  Follow low-salt diet  Avoid NSAIDs (no ibuprofen, Motrin, Advil, Aleve, naproxen)  Okay to take Tylenol or paracetamol or acetaminophen as needed for pain  You develop any worsening symptoms of nausea, vomiting, tiredness, increased fluid retention, shortness of breath, please contact me or go to the emergency department for urgent evaluation  If not, I would like to see you back in 8-10 weeks as above  HPI: Rozina Reddy is a 77 y o  female who is here for Follow-up and Chronic Kidney Disease    Hospitalized in 10/2018 due to a small bowel obstruction that resolved spontaneously  Hospitalized early 01/2019 status post fall, found to have a left-sided subdural hematoma, creatinine on admission 3 0 that decreased to 2 5 after intravenously fluids    Hospitalized 05/2019 after status post fall complicated with T-spine compression fracture, found to have sepsis secondary to UTI, also developed acute kidney injury on top of CKD, creatinine on admission was 3 68, renal function improved and creatinine went down to 2 59 on discharge day  Last time hospitalized 08/2020 with severe acute kidney injury in the setting of incarcerated ileal conduit parastomal hernia status post ex lap and parastomal hernia repair  Renal function improved with creatinine down to the low 3s  Last time seen was 11/2020, today returns for follow up  Since last office visit patient underwent left brachiocephalic AV fistula creation on 01/05/2021  In general she is feeling about the same , tired but not significantly worsened  Denies any nausea, no vomiting, no chest pain, no shortness of breath, no significant lower extremity swelling  No abdominal pain, currently complaining of some constipation  Noticed ileal conduit urine output stable  Denies any NSAID use  ROS: All the systems were reviewed and were negative except as documented on the H&P  Allergies: Chlorhexidine    Medications:   Current Outpatient Medications:     acetaminophen (TYLENOL) 325 mg tablet, 650 mg every 6 hours as needed for mild pain or headache, Disp: 30 tablet, Rfl: 0    aspirin (ECOTRIN LOW STRENGTH) 81 mg EC tablet, Take 1 tablet (81 mg total) by mouth daily, Disp: 90 tablet, Rfl: 4    atorvastatin (LIPITOR) 40 mg tablet, Take 1 tablet (40 mg total) by mouth daily (Patient taking differently: Take 40 mg by mouth daily at bedtime ), Disp: 90 tablet, Rfl: 6    calcitriol (ROCALTROL) 0 25 mcg capsule, TAKE 1 CAPSULE BY MOUTH EVERY OTHER DAY , Disp: 15 capsule, Rfl: 5    Cholecalciferol (VITAMIN D3) 1000 UNITS CAPS, Take 1,000 unit marking on U-100 syringe by mouth daily  , Disp: , Rfl:     citalopram (CeleXA) 20 mg tablet, Take 20 mg by mouth daily , Disp: , Rfl:     Eliquis 2 5 MG, TAKE 1 TABLET BY MOUTH TWICE A DAY, Disp: 60 tablet, Rfl: 5    levothyroxine 75 mcg tablet, Take 75 mcg by mouth daily, Disp: , Rfl: 3    loperamide (IMODIUM) 2 mg capsule, Take 1 capsule (2 mg total) by mouth 4 (four) times a day as needed for diarrhea, Disp: 12 capsule, Rfl: 0    melatonin 3 mg, Take 1 tablet (3 mg total) by mouth daily at bedtime, Disp: 30 tablet, Rfl: 0    metoprolol tartrate (LOPRESSOR) 25 mg tablet, Take 1 tablet (25 mg total) by mouth 2 (two) times a day, Disp: 60 tablet, Rfl: 0    ruxolitinib (Jakafi) 10 mg tablet, Take 1 tablet (10 mg total) by mouth daily, Disp: 30 tablet, Rfl: 3    saccharomyces boulardii (FLORASTOR) 250 mg capsule, Take 1 capsule by mouth daily  , Disp: , Rfl:     oxyCODONE-acetaminophen (PERCOCET) 5-325 mg per tablet, Take 1 tablet by mouth every 6 (six) hours as needed for moderate pain for up to 10 daysMax Daily Amount: 4 tablets (Patient not taking: Reported on 1/11/2021), Disp: 10 tablet, Rfl: 0    Past Medical History:   Diagnosis Date    Anxiety     Bowel obstruction (HCC)     Chronic kidney disease (CKD), stage IV (severe) (HCC)     stage IV    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    Circulation problem     Compression fracture of cervical spine ([facility] )     Hernia of abdominal cavity     History of kidney problems     Hydronephrosis     Hypertension     Incontinence     Lung mass     Improving on PET/CT 1/2016    Polycythemia vera ([facility] )     Pulmonary embolism ([facility] ) 2014    Shingles     Urinary tract infection      Past Surgical History:   Procedure Laterality Date    ABDOMINAL ADHESION SURGERY N/A 8/9/2020    Procedure: LYSIS ADHESIONS;  Surgeon: Susannah Hatfield DO;  [facility];  Service: General    BLADDER SUSPENSION      BOTOX INJECTION N/A 7/27/2016    Procedure: BOTOX INJECTION ;  Surgeon: Yahaira Mckeon MD;  [facility];  Service:     CHOLECYSTECTOMY N/A     COLONOSCOPY      CYSTECTOMY, RADICAL WITH ILEOCONDUIT N/A 10/4/2016    Procedure: SUPRATRIGONAL CYSTECTOMY WITH ILEAL CONDUIT ;  Surgeon: Jef Darling Darcy Ceballos MD;  [facility];  Service:    Isa Lame W/ RETROGRADES Bilateral 7/27/2016    Procedure: CYSTOSCOPY; RETROGRADE PYELOGRAM ;  Surgeon: Gina Chen MD;  [facility];  Service:    [id number]      IR NON-TUNNELED CENTRAL LINE PLACEMENT  7/17/2020    IR NON-TUNNELED CENTRAL LINE PLACEMENT  8/14/2020    LAPAROTOMY N/A 8/9/2020    Procedure: LAPAROTOMY EXPLORATORY, PARASTOMAL HERNIA REPAIR WITH MESH;  Surgeon: Lane Aleman DO;  [facility];  Service: General    ID ANASTOMOSIS,AV,ANY SITE Right 1/5/2021    Procedure: Creation of right brachiobasilic fistula; Surgeon: Marco Chairez MD;  [facility];  Service: Vascular    ID COLONOSCOPY FLX DX W/COLLJ SPEC WHEN PFRMD N/A 8/31/2016    Procedure: COLONOSCOPY;  Surgeon: Adiren Carey MD;  [facility]; Service: Gastroenterology    ID CYSTOSCOPY,INSERT URETERAL STENT Bilateral 7/27/2016    Procedure: STENT INSERTION; EXCISION OF MESH ;  Surgeon: Gina Chen MD;  [facility];  Service: Urology    TONSILLECTOMY      TUBAL LIGATION      WISDOM TOOTH EXTRACTION       Family History   Problem Relation Age of Onset    Cancer Mother         small cell cancer     Heart disease Father     COPD Father     Heart disease Brother     Nephrolithiasis Brother       reports that she has never smoked  She has never used smokeless tobacco  She reports previous alcohol use  She reports previous drug use  Physical Exam:   Vitals:    01/11/21 0958   BP: 129/79   BP Location: Left arm   Patient Position: Sitting   Cuff Size: Standard   Pulse: 74   Weight: 87 9 kg (193 lb 12 8 oz)   Height: 5' (1 524 m)     Body mass index is 37 85 kg/m²        General: conscious, cooperative, in not acute distress  Eyes: conjunctivae pink, anicteric sclerae  ENT: lips and mucous membranes moist  Neck: supple, no JVD  Chest: clear breath sounds bilateral, no crackles, ronchus or wheezings  CVS: distinct S1 & S2, normal rate, regular rhythm  Abdomen: obese, non-tender, non-distended, normoactive bowel sounds  Back: no CVA tenderness  Extremities: no significant edema of both legs, right brachiocephalic AV fistula with positive thrill and bruit, some edema and ecchymosis around incisional area  Skin: no rash  Neuro: awake, alert, oriented, no asterixis          Laboratory Results:  Lab Results   Component Value Date    WBC 4 63 12/28/2020    HGB 10 4 (L) 12/28/2020    HCT 32 7 (L) 12/28/2020    MCV 95 12/28/2020     12/28/2020     Lab Results   Component Value Date    SODIUM 141 12/28/2020    K 4 4 12/28/2020     (H) 12/28/2020    CO2 22 12/28/2020    BUN 49 (H) 12/28/2020    CREATININE 3 29 (H) 12/28/2020    GLUC 82 09/22/2020    CALCIUM 9 0 12/28/2020       Lab Results   Component Value Date     5 (H) 12/28/2020    CALCIUM 9 0 12/28/2020    PHOS 4 2 (H) 12/28/2020             Portions of the record may have been created with voice recognition software  Occasional wrong word or "sound a like" substitutions may have occurred due to the inherent limitations of voice recognition software  Read the chart carefully and recognize, using context, where substitutions have occurred  If you have any questions, please contact the dictating provider

## 2021-01-20 DIAGNOSIS — N25.81 SECONDARY HYPERPARATHYROIDISM OF RENAL ORIGIN (HCC): ICD-10-CM

## 2021-01-20 RX ORDER — CALCITRIOL 0.25 UG/1
CAPSULE, LIQUID FILLED ORAL
Qty: 15 CAPSULE | Refills: 5 | Status: SHIPPED | OUTPATIENT
Start: 2021-01-20 | End: 2021-07-07

## 2021-01-21 ENCOUNTER — TELEPHONE (OUTPATIENT)
Dept: ADMINISTRATIVE | Facility: HOSPITAL | Age: 76
End: 2021-01-21

## 2021-01-21 NOTE — TELEPHONE ENCOUNTER
COVID Pre-Visit Screening     1  Is this a family member screening? No  2  Have you traveled outside of your state in the past 2 weeks? No  3  Do you presently have a fever or flu-like symptoms? No  4  Do you have symptoms of an upper respiratory infection like runny nose, sore throat, or cough? No  5  Are you suffering from new headache that you have not had in the past?  No  6  Do you have/have you experienced any new shortness of breath recently? No  7  Do you have any new diarrhea, nausea or vomiting? No  8  Have you been in contact with anyone who has been sick or diagnosed with COVID-19? No  9  Do you have any new loss of taste or smell? No  10  Are you able to wear a mask without a valve for the entire visit?  Yes     Patient is coming in alone, no to all screening questions

## 2021-01-22 ENCOUNTER — OFFICE VISIT (OUTPATIENT)
Dept: VASCULAR SURGERY | Facility: CLINIC | Age: 76
End: 2021-01-22

## 2021-01-22 VITALS
HEART RATE: 72 BPM | HEIGHT: 60 IN | WEIGHT: 194 LBS | TEMPERATURE: 98.3 F | BODY MASS INDEX: 38.09 KG/M2 | DIASTOLIC BLOOD PRESSURE: 86 MMHG | SYSTOLIC BLOOD PRESSURE: 140 MMHG | RESPIRATION RATE: 18 BRPM

## 2021-01-22 DIAGNOSIS — M79.89 SWELLING OF RIGHT UPPER EXTREMITY: ICD-10-CM

## 2021-01-22 DIAGNOSIS — I82.B21: ICD-10-CM

## 2021-01-22 DIAGNOSIS — N18.5 STAGE 5 CHRONIC KIDNEY DISEASE NOT ON CHRONIC DIALYSIS (HCC): Primary | ICD-10-CM

## 2021-01-22 PROCEDURE — 99024 POSTOP FOLLOW-UP VISIT: CPT | Performed by: SURGERY

## 2021-01-22 NOTE — PROGRESS NOTES
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)  Swelling of right upper extremity  -     VAS hemodialysis access duplex right upper limb avf; Future  -reviewed vein mapping which shows chronic SVT in the B cephalic veins just above the AC fossa; L basilic is inadequate size; R basilic appears adequate size in the upper arm; brachial bifur at the [facility] fossa; no evidence of R central occlusion  -s/p creation of right brachiobasilic fistula 4/9/65  -excellent thrill/bruit; incision healing well; does have significant RUE edema  -will start compression and elevation of RUE for edema  -has had 2 catheters to R neck, but short term; venogram at first cath placement didn't show central stenosis and preop DU mapping showed patent SC vein, but patient does have a diagnosis of R SC vein chronic thrombosis in her chart (patient knows nothing about this); if swelling doesn't resolve with medical measures and time, may require central venogram and possible intervention  -will get DU in 2-3 weeks to assess for maturity  -f/u after DU complete to review, discuss symptoms, and plan for second stage if ready  -cards clearance form first surgeyr (Dr Jong Simon): stress with small reversible defect; plan for medical management; placed at "acceptable risk"    Subjective:      Patient ID: Marisa Simpson is a 77 y o  female  Patient is s/p Rt brachiocephalic AVF creation on 1/2/13 by Dr Chong Taylor Doctor  Pt c/o swelling in the Rt arm since her sx  There is bruising in her Rt arm and some warmth  Pt denies pain, numbness, tingling, or loss of mobility   Pt denies fevers or chills  Pt states that she has been elevating her arm as much as possible  Pt is not on HD  Pt is on Eliquis, ASA 81 mg, and Atorvastatin  HPI:    Patient presents for first POC after RUE fistula creation  Referred by Dr Ana Lujan  R-handed  CKD, not yet on HD  Had LUANN with recent admission  Had bladder surgery originally for urinary incontinance '16  Presented recently with incarcerated parastomal hernia s/p ex-lap, reduction and parastomal hernia repair with Phasix mesh  Has had prolonged recovery from this  Is living at home now, doing home PT  Walking with cane/walker  Has a grown autistic son in his 45s who she cares for    Since surgery, patient complains of RUE edema  THis has been stable  She has been elevating is occasionally  Denies any hand symptoms, denies numbness/tingling/weakness  Denies fever/chills  Has hx of 2 lines to the R neck, one in July '20 and one in Aug '20  Denies known hx of central stenosis althought 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  She doesn't know any thing about this  Denies CP or cardiac history  Not established with cards  Complains of SOB with activity  The following portions of the patient's history were reviewed and updated as appropriate: allergies, current medications, past family history, past medical history, past social history, past surgical history and problem list     Review of Systems   Constitutional: Negative  HENT: Negative  Eyes: Negative  Respiratory: Negative  Negative for shortness of breath  Cardiovascular: Positive for leg swelling  Negative for chest pain  Gastrointestinal: Negative  Endocrine: Negative  Genitourinary: Negative  Musculoskeletal: Positive for gait problem  Rt arm swelling   Skin: Negative  Negative for wound  Allergic/Immunologic: Negative  Neurological: Negative for dizziness, weakness and numbness  Hematological: Bruises/bleeds easily  Psychiatric/Behavioral: Negative  Objective:      /86 (BP Location: Left arm, Patient Position: Sitting)   Pulse 72   Temp 98 3 °F (36 8 °C) (Tympanic)   Resp 18   Ht 5' (1 524 m)   Wt 88 kg (194 lb)   BMI 37 89 kg/m²          Physical Exam  Vitals signs and nursing note reviewed  Constitutional:       Appearance: She is well-developed  HENT:      Head: Normocephalic and atraumatic  Eyes:      Conjunctiva/sclera: Conjunctivae normal    Neck:      Musculoskeletal: Normal range of motion and neck supple  Cardiovascular:      Rate and Rhythm: Normal rate and regular rhythm  Pulses:           Radial pulses are 2+ on the right side and 2+ on the left side  Dorsalis pedis pulses are 2+ on the right side and 2+ on the left side  Posterior tibial pulses are 0 on the right side and 0 on the left side  Heart sounds: Normal heart sounds  No murmur  Comments: No carotid bruits B    R B/B fistula w/ excellent thrill and bruit  Pulmonary:      Effort: Pulmonary effort is normal  No respiratory distress  Breath sounds: Normal breath sounds  No wheezing or rales  Abdominal:      General: There is no distension  Palpations: Abdomen is soft  Tenderness: There is no abdominal tenderness  There is no rebound  Musculoskeletal: Normal range of motion  Right lower le+ Edema present  Left lower le+ Edema present  Skin:     General: Skin is warm and dry  Comments: R anticub incision C/D/I, healing well  Moderate edema of the R hand, forearm, upper arm with some resolving ecchymosis   Neurological:      Mental Status: She is alert and oriented to person, place, and time  Psychiatric:         Behavior: Behavior normal            I have reviewed and made appropriate changes to the review of systems input by the medical assistant      Vitals:    21 1008   BP: 140/86   BP Location: Left arm   Patient Position: Sitting   Pulse: 72 Resp: 18   Temp: 98 3 °F (36 8 °C)   TempSrc: Tympanic   Weight: 88 kg (194 lb)   Height: 5' (1 524 m)       Patient Active Problem List   Diagnosis    Chronic saddle pulmonary embolism (HCC)    Bladder mass    Small bowel obstruction (HCC)    Obstructive uropathy    Secondary hyperpar[facility]roidism of renal origin ([facility] )    Hypothyroidism    Hypertension    [facility]    Fall    Subdural hematoma, acute (HCC)    Intraventricular hemorrhage (HCC)    Diarrhea    Recurrent Clostridium difficile diarrhea    Anemia in CKD (chronic kidney disease)    Mass of urethra    Stage 5 chronic kidney disease not on chronic dialysis [facility]    Thoracic compression fracture (HCC)    Hematuria    Abnormal finding on MRI of brain    Benign neoplasm of supratentorial region of brain ([facility] )    Meningioma ([facility] )    UTI (urinary tract infection)    Herpes zoster    Inverted T wave    Polycythemia    Encounter for surgical aftercare following surgery on the digestive system    History of Clostridioides difficile colitis    History of shingles    Depression    Adult BMI 39 0-39 9 kg/sq m    Chronic thrombosis of subclavian vein (HCC)    Swelling of right upper extremity       Past Surgical History:   Procedure Laterality Date    ABDOMINAL ADHESION SURGERY N/A 8/9/2020    Procedure: LYSIS ADHESIONS;  Surgeon: Rozina Humphrey DO;  [facility];  Service: General    BLADDER SUSPENSION      BOTOX INJECTION N/A 7/27/2016    Procedure: BOTOX INJECTION ;  Surgeon: Susan Sorensen MD;  [facility];  Service:     CHOLECYSTECTOMY N/A     COLONOSCOPY      CYSTECTOMY, RADICAL WITH ILEOCONDUIT N/A 10/4/2016    Procedure: SUPRATRIGONAL CYSTECTOMY WITH ILEAL CONDUIT ;  Surgeon: Susan Sorensen MD;  [facility];  Service:    Viral Cristobal W/ RETROGRADES Bilateral 7/27/2016    Procedure: Thiago Marlow ;  Surgeon: Susan Sorensen MD;  [facility];  Service:  HERNIA REPAIR      IR NON-TUNNELED CENTRAL LINE PLACEMENT  7/17/2020    IR NON-TUNNELED CENTRAL LINE PLACEMENT  8/14/2020    LAPAROTOMY N/A 8/9/2020    Procedure: LAPAROTOMY EXPLORATORY, PARASTOMAL HERNIA REPAIR WITH MESH;  Surgeon: Jessee Hopkins DO;  [facility];  Service: General    AK ANASTOMOSIS,AV,ANY SITE Right 1/5/2021    Procedure: Creation of right brachiobasilic fistula; Surgeon: Ankur Chairez MD;  [facility];  Service: Vascular    AK COLONOSCOPY FLX DX W/COLLJ SPEC WHEN PFRMD N/A 8/31/2016    Procedure: COLONOSCOPY;  Surgeon: Danielle Childs MD;  [facility]; Service: Gastroenterology    AK CYSTOSCOPY,INSERT URETERAL STENT Bilateral 7/27/2016    Procedure: STENT INSERTION; EXCISION OF MESH ;  Surgeon: Tnoa Romero MD;  [facility];  Service: Urology    TONSILLECTOMY      TUBAL LIGATION      WISDOM TOOTH EXTRACTION         Family History   Problem Relation Age of Onset    Cancer Mother         small cell cancer     Heart disease Father     COPD Father     Heart disease Brother     Nephrolithiasis Brother        Social History     Socioeconomic History    Marital status:       Spouse name: Not on file    Number of children: Not on file    Years of education: Not on file    Highest education level: Not on file   Occupational History    Not on file   Social Needs    Financial resource strain: Not on file    Food insecurity     Worry: Not on file     Inability: Not on file    Transportation needs     Medical: Not on file     Non-medical: Not on file   Tobacco Use    Smoking status: Never Smoker    Smokeless tobacco: Never Used    Tobacco comment: n/a   Substance and Sexual Activity    Alcohol use: Not Currently     Frequency: Never     Binge frequency: Never     Comment: n/s    Drug use: Not Currently     Comment: n/a    Sexual activity: Not Currently     Partners: Male   Lifestyle    Physical activity     Days per week: Not on file     Minutes per session: Not on file    Stress: Not on file   Relationships    Social connections     Talks on phone: Not on file     Gets together: Not on file     Attends [employer] service: Not on file     Active member of club or organization: Not on file     Attends meetings of clubs or organizations: Not on file     Relationship status: Not on file    Intimate partner violence     Fear of current or ex partner: Not on file     Emotionally abused: Not on file     Physically abused: Not on file     Forced sexual activity: Not on file   Other Topics Concern    Not on file   Social History Narrative    Not on file       Allergies   Allergen Reactions    Chlorhexidine Rash     petichi like rash when using chlorhexidine swabs prior to IV  Current Outpatient Medications:     acetaminophen (TYLENOL) 325 mg tablet, 650 mg every 6 hours as needed for mild pain or headache, Disp: 30 tablet, Rfl: 0    aspirin (ECOTRIN LOW STRENGTH) 81 mg EC tablet, Take 1 tablet (81 mg total) by mouth daily, Disp: 90 tablet, Rfl: 4    atorvastatin (LIPITOR) 40 mg tablet, Take 1 tablet (40 mg total) by mouth daily (Patient taking differently: Take 40 mg by mouth daily at bedtime ), Disp: 90 tablet, Rfl: 6    calcitriol (ROCALTROL) 0 25 mcg capsule, TAKE 1 CAPSULE BY MOUTH EVERY OTHER DAY, Disp: 15 capsule, Rfl: 5    Cholecalciferol (VITAMIN D3) 1000 UNITS CAPS, Take 1,000 unit marking on U-100 syringe by mouth daily  , Disp: , Rfl:     citalopram (CeleXA) 20 mg tablet, Take 20 mg by mouth daily , Disp: , Rfl:     Eliquis 2 5 MG, TAKE 1 TABLET BY MOUTH TWICE A DAY, Disp: 60 tablet, Rfl: 5    loperamide (IMODIUM) 2 mg capsule, Take 1 capsule (2 mg total) by mouth 4 (four) times a day as needed for diarrhea, Disp: 12 capsule, Rfl: 0    melatonin 3 mg, Take 1 tablet (3 mg total) by mouth daily at bedtime, Disp: 30 tablet, Rfl: 0    metoprolol tartrate (LOPRESSOR) 25 mg tablet, Take 1 tablet (25 mg total) by mouth 2 (two) times a day, Disp: 60 tablet, Rfl: 0    ruxolitinib (Jakafi) 10 mg tablet, Take 1 tablet (10 mg total) by mouth daily, Disp: 30 tablet, Rfl: 3    saccharomyces boulardii (FLORASTOR) 250 mg capsule, Take 1 capsule by mouth daily  , Disp: , Rfl:     levothyroxine 75 mcg tablet, Take 75 mcg by mouth daily, Disp: , Rfl: 3

## 2021-01-22 NOTE — PATIENT INSTRUCTIONS
1) CKD  -we did surgery to create a fistula to help get you prepared for eventual dialysis  -in 2-3 weeks, we will get an ultrasound to check the maturity or growth of the fistula  -if the fistula is maturing well, we will plan the second stage surgery for your fistula to move it to the surface and the top of your arm  -for the swelling, please elevate your arm as much as possible (above the level of your heart) and use the ACE bandage during the day  -if the swelling does not go down, we might do a venogram procedure to look at the larger veins in the chest and make sure there is no scarring or narrowing that is contributing to the swelling  -DO NOT GET ANY BLOOD DRAWS OR IVS IN THE RIGHT ARM FROM NOW ON    Arteriovenous Fistula Creation for Hemodialysis   WHAT YOU NEED TO KNOW:   What do I need to know about an arteriovenous fistula (AVF) creation? An AVF creation is surgery to connect an artery to a vein  This surgery is done so you can receive hemodialysis  The AVF is usually placed in your forearm or upper arm  How do I prepare for an AVF creation? · Your surgeon will talk to you about how to prepare for surgery  You may need an ultrasound of your arm before surgery  An ultrasound will help your surgeon decide which artery and vein he will use to create your AVF  You may need to stop taking blood thinners 1 week before surgery  · Your surgeon may tell you not to eat or drink anything after midnight on the day of your surgery  He will tell you what medicines to take or not take on the day of your surgery  You may be given an antibiotic through your IV to help prevent a bacterial infection  Tell a healthcare provider if you have ever had an allergic reaction to an antibiotic  Ask someone to drive you home and stay with you after surgery  What will happen during an AVF creation? You may be given general anesthesia to keep you asleep and free from pain during surgery   You may instead be given local or regional anesthesia to numb the surgery area  With local or regional anesthesia, you may still feel pressure or pushing during surgery, but you should not feel any pain  Your surgeon will make an incision in your arm  He will connect your artery and vein with stitches  Your incision will be closed with stitches and covered with a bandage  What will happen after an AVF creation? · Healthcare providers will monitor you until you are awake  They will feel the area over your AVF for a thrill, and listen for a bruit  A thrill is a vibration, and a bruit is a humming noise  The presence of a bruit and a thrill mean that blood is moving through your AVF properly  A healthcare provider will show you how to feel for a thrill  · Your arm may feel sore for several days after your surgery  You may have mild bruising or swelling near your wound  Your wound may drain a few drops of blood or pink fluid for 24 hours  Your AVF will take 2 to 3 months to heal  After this time, it can be used for hemodialysis  What are the risks of an AVF creation? You may bleed more than expected or get an infection  Your AVF may become narrowed or blocked  This may slow or stop blood flow through your AVF, or to your arm or hand  You may need surgery to fix this or create another AVF  You may get a blood clot in your arm or leg  This may become life-threatening  CARE AGREEMENT:   You have the right to help plan your care  Learn about your health condition and how it may be treated  Discuss treatment options with your caregivers to decide what care you want to receive  You always have the right to refuse treatment  The above information is an  only  It is not intended as medical advice for individual conditions or treatments  Talk to your doctor, nurse or pharmacist before following any medical regimen to see if it is safe and effective for you    © 2017 [employer] Information is for End User's use only and may not be sold, redistributed or otherwise used for commercial purposes  All illustrations and images included in CareNotes® are the copyrighted property of A D A M , [employer]  or Venu Delgado

## 2021-02-05 ENCOUNTER — TELEPHONE (OUTPATIENT)
Dept: VASCULAR SURGERY | Facility: CLINIC | Age: 76
End: 2021-02-05

## 2021-02-05 ENCOUNTER — PREP FOR PROCEDURE (OUTPATIENT)
Dept: INTERVENTIONAL RADIOLOGY/VASCULAR | Facility: CLINIC | Age: 76
End: 2021-02-05

## 2021-02-05 ENCOUNTER — OFFICE VISIT (OUTPATIENT)
Dept: VASCULAR SURGERY | Facility: CLINIC | Age: 76
End: 2021-02-05

## 2021-02-05 VITALS
HEIGHT: 60 IN | WEIGHT: 195 LBS | HEART RATE: 69 BPM | TEMPERATURE: 98.8 F | RESPIRATION RATE: 17 BRPM | BODY MASS INDEX: 38.28 KG/M2

## 2021-02-05 DIAGNOSIS — N18.5 STAGE 5 CHRONIC KIDNEY DISEASE NOT ON CHRONIC DIALYSIS (HCC): Primary | ICD-10-CM

## 2021-02-05 DIAGNOSIS — E03.9 HYPOTHYROIDISM, UNSPECIFIED TYPE: ICD-10-CM

## 2021-02-05 DIAGNOSIS — I82.B21: Primary | ICD-10-CM

## 2021-02-05 DIAGNOSIS — E78.5 HYPERLIPIDEMIA, UNSPECIFIED HYPERLIPIDEMIA TYPE: ICD-10-CM

## 2021-02-05 DIAGNOSIS — I10 ESSENTIAL HYPERTENSION: ICD-10-CM

## 2021-02-05 PROCEDURE — 99024 POSTOP FOLLOW-UP VISIT: CPT | Performed by: PHYSICIAN ASSISTANT

## 2021-02-05 NOTE — ASSESSMENT & PLAN NOTE
-Stable  -Continue statin therapy   -Encourage low cholesterol, low fat diet  -Medical management per PCP

## 2021-02-05 NOTE — PROGRESS NOTES
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, CKD s/p right brachocephalic AVF placement (Doctor 1/5/21)  Patient reports with increased swelling to the RUE and serous drainage from the incision site  Patient has a diagnosis in her chart of chronic subclavian vein thrombosis, however vein mapping from 10/22 demonstrates patent subclavian vein  -Increased swelling in the setting of recent AVF placement of the RUE  -Patient will require venogram with possible intervention to further assess outflow  Will require pre and post procedure hydration   -Incision is well healed  No evidence of infection    -Continue elevation, gentle compression   -Continue ASA, Eliquis  -AVF with palpable thrill and audible bruit   -Discussed with Dr Jones Chairez       Hypertension  -BP stable in the office today  -Continue adequate BP control  -Medical management per PCP      Hypothyroidism  -Stable  -Continue levothyroxine, regular TSH checks  -Medical management per PCP      Hyperlipemia  -Stable  -Continue statin therapy   -Encourage low cholesterol, low fat diet  -Medical management per PCP         Diagnoses and all orders for this visit:    Stage 5 chronic kidney disease not on chronic dialysis ([facility] [address] )  -     Cancel: IR diagnostic upper extremity venogram; Future  -     Ambulatory referral to Interventional Radiology; Future  -     IR AV fistulagram/graftogram; Future    Essential hypertension    Hypothyroidism, unspecified type    Hyperlipidemia, unspecified hyperlipidemia type          Subjective:      Patient ID: David Dias is a 77 y o  female  Patient presents in office p/o with RUE drainage and swelling  Patient had a AVF created by Dr Jones Naranjo Doctor on  01/05/2021  Pt c/o of having swelling and drainage that doesn't come from the incision site   Drainage is a white clear liquid  Patient is currently taking ASA 81 mg, Atorvastatin and Eliquis  61-year-old female with history of CKD s/p right brachiocephalic AV fistula placement in January  Patient has had increased swelling postoperatively  She has been using light compression and elevating her arm  There is a question of compliance  She reports to the office today because she has been having increased swelling and discomfort to the right upper extremity  She has a history of right subclavian vein thrombosis  She denies motor or sensory dysfunction  She denies pain to her fingers or skin discoloration  She does report that she is having increased sweating of the right upper extremity  The following portions of the patient's history were reviewed and updated as appropriate: allergies, current medications, past family history, past medical history, past social history, past surgical history and problem list     Review of Systems   Constitutional: Negative  HENT: Negative  Eyes: Negative  Respiratory: Negative  Cardiovascular: Positive for leg swelling  Gastrointestinal: Negative  Endocrine: Negative  Genitourinary: Negative  Musculoskeletal: Negative  Arm swelling    Skin: Positive for color change (Right arm)  Allergic/Immunologic: Negative  Neurological: Negative  Hematological: Bruises/bleeds easily  Psychiatric/Behavioral: Negative  I have reviewed and made appropriate changes to the review of systems input by the medical assistant      Vitals:    02/05/21 1448   Pulse: 69   Resp: 17   Temp: 98 8 °F (37 1 °C)   TempSrc: Tympanic   Weight: 88 5 kg (195 lb)   Height: 5' (1 524 m)       Patient Active Problem List   Diagnosis    Chronic saddle pulmonary embolism (HCC)    Bladder mass    Small bowel obstruction (HCC)    Obstructive uropathy    Secondary hyperparathyroidism of renal origin ([facility] [address] )    Hypothyroidism    Hypertension    Polycythemia vera [facility])    Fall    Subdural hematoma, acute ([facility] [address] )    Intraventricular hemorrhage (HCC)    Diarrhea    Recurrent Clostridium difficile diarrhea    Anemia in CKD (chronic kidney disease)    Mass of urethra    Stage 5 chronic kidney disease not on chronic dialysis ([facility] [address] )    Thoracic compression fracture (HCC)    Hematuria    Abnormal finding on MRI of brain    Benign neoplasm of supratentorial region of brain ([facility] [address] )    M[facility]    UTI (urinary tract infection)    Herpes zoster    Inverted T wave    Polycythemia    Encounter for surgical aftercare following surgery on the digestive system    History of Clostridioides difficile colitis    History of shingles    Depression    Adult BMI 39 0-39 9 kg/sq m    Chronic thrombosis of subclavian vein (HCC)    Swelling of right upper extremity    Hyperlipemia       Past Surgical History:   Procedure Laterality Date    ABDOMINAL ADHESION SURGERY N/A 8/9/2020    Procedure: LYSIS ADHESIONS;  Surgeon: Dori Alejo DO;  [facility];  Service: General    BLADDER SUSPENSION      BOTOX INJECTION N/A 7/27/2016    Procedure: BOTOX INJECTION ;  Surgeon: Ankur Raza MD;  [facility];  Service:     CHOLECYSTECTOMY N/A     COLONOSCOPY      CYSTECTOMY, RADICAL WITH ILEOCONDUIT N/A 10/4/2016    Procedure: SUPRATRIGONAL CYSTECTOMY WITH ILEAL CONDUIT ;  Surgeon: Ankur Raza MD;  [facility];  Service:    Francisco Mcdonnell W/ RETROGRADES Bilateral 7/27/2016    Procedure: CYSTOSCOPY; RETROGRADE PYELOGRAM ;  Surgeon: Ankur Raza MD;  [facility];  Service:     HERNIA REPAIR      IR NON-TUNNELED CENTRAL LINE PLACEMENT  7/17/2020    IR NON-TUNNELED CENTRAL LINE PLACEMENT  8/14/2020    LAPAROTOMY N/A 8/9/2020    Procedure: LAPAROTOMY EXPLORATORY, PARASTOMAL HERNIA REPAIR WITH MESH;  Surgeon: Dori Alejo DO;  [facility];  Service: General    W[place] ANASTOMOSIS,AV,ANY SITE Right 1/5/2021    Procedure: Creation of right brachiobasilic fistula; Surgeon: aMrco Chairez MD;  [facility];  Service: Vascular    TX COLONOSCOPY FLX DX W/COLLJ SPEC WHEN PFRMD N/A 8/31/2016    Procedure: COLONOSCOPY;  Surgeon: Adrien Carey MD;  [facility]; Service: Gastroenterology    TX CYSTOSCOPY,INSERT URETERAL STENT Bilateral 7/27/2016    Procedure: STENT INSERTION; EXCISION OF MESH ;  Surgeon: Gina Chen MD;  [facility];  Service: Urology    TONSILLECTOMY      TUBAL LIGATION      WISDOM TOOTH EXTRACTION         Family History   Problem Relation Age of Onset    Cancer Mother         small cell cancer     Heart disease Father     COPD Father     Heart disease Brother     Nephrolithiasis Brother        Social History     Socioeconomic History    Marital status:       Spouse name: Not on file    Number of children: Not on file    Years of education: Not on file    Highest education level: Not on file   Occupational History    Not on file   Social Needs    Financial resource strain: Not on file    Food insecurity     Worry: Not on file     Inability: Not on file    Transportation needs     Medical: Not on file     Non-medical: Not on file   Tobacco Use    Smoking status: Never Smoker    Smokeless tobacco: Never Used    Tobacco comment: n/a   Substance and Sexual Activity    Alcohol use: Not Currently     Frequency: Never     Binge frequency: Never     Comment: n/s    Drug use: Not Currently     Comment: n/a    Sexual activity: Not Currently     Partners: Male   Lifestyle    Physical activity     Days per week: Not on file     Minutes per session: Not on file    Stress: Not on file   Relationships    Social connections     Talks on phone: Not on file     Gets together: Not on file     Attends Sabianist service: Not on file     Active member of club or organization: Not on file     Attends meetings of clubs or organizations: Not on file     Relationship status: Not on file  Intimate partner violence     Fear of current or ex partner: Not on file     Emotionally abused: Not on file     Physically abused: Not on file     Forced sexual activity: Not on file   Other Topics Concern    Not on file   Social History Narrative    Not on file       Allergies   Allergen Reactions    Chlorhexidine Rash     petichi like rash when using chlorhexidine swabs prior to IV  Current Outpatient Medications:     acetaminophen (TYLENOL) 325 mg tablet, 650 mg every 6 hours as needed for mild pain or headache, Disp: 30 tablet, Rfl: 0    aspirin (ECOTRIN LOW STRENGTH) 81 mg EC tablet, Take 1 tablet (81 mg total) by mouth daily, Disp: 90 tablet, Rfl: 4    atorvastatin (LIPITOR) 40 mg tablet, Take 1 tablet (40 mg total) by mouth daily (Patient taking differently: Take 40 mg by mouth daily at bedtime ), Disp: 90 tablet, Rfl: 6    calcitriol (ROCALTROL) 0 25 mcg capsule, TAKE 1 CAPSULE BY MOUTH EVERY OTHER DAY, Disp: 15 capsule, Rfl: 5    Cholecalciferol (VITAMIN D3) 1000 UNITS CAPS, Take 1,000 unit marking on U-100 syringe by mouth daily  , Disp: , Rfl:     citalopram (CeleXA) 20 mg tablet, Take 20 mg by mouth daily , Disp: , Rfl:     Eliquis 2 5 MG, TAKE 1 TABLET BY MOUTH TWICE A DAY, Disp: 60 tablet, Rfl: 5    levothyroxine 75 mcg tablet, Take 75 mcg by mouth daily, Disp: , Rfl: 3    loperamide (IMODIUM) 2 mg capsule, Take 1 capsule (2 mg total) by mouth 4 (four) times a day as needed for diarrhea, Disp: 12 capsule, Rfl: 0    melatonin 3 mg, Take 1 tablet (3 mg total) by mouth daily at bedtime, Disp: 30 tablet, Rfl: 0    metoprolol tartrate (LOPRESSOR) 25 mg tablet, Take 1 tablet (25 mg total) by mouth 2 (two) times a day, Disp: 60 tablet, Rfl: 0    ruxolitinib (Jakafi) 10 mg tablet, Take 1 tablet (10 mg total) by mouth daily, Disp: 30 tablet, Rfl: 3    saccharomyces boulardii (FLORASTOR) 250 mg capsule, Take 1 capsule by mouth daily  , Disp: , Rfl:     Objective:      Pulse 69 Temp 98 8 °F (37 1 °C) (Tympanic)   Resp 17   Ht 5' (1 524 m)   Wt 88 5 kg (195 lb)   BMI 38 08 kg/m²          Physical Exam  Constitutional:       General: She is not in acute distress  Appearance: Normal appearance  She is obese  She is not ill-appearing  HENT:      Head: Normocephalic and atraumatic  Nose: Nose normal       Mouth/Throat:      Mouth: Mucous membranes are moist       Pharynx: Oropharynx is clear  Eyes:      General: No scleral icterus  Extraocular Movements: Extraocular movements intact  Conjunctiva/sclera: Conjunctivae normal    Neck:      Musculoskeletal: Normal range of motion and neck supple  Vascular: No carotid bruit  Cardiovascular:      Rate and Rhythm: Normal rate and regular rhythm  Pulses:           Radial pulses are 1+ on the right side and 2+ on the left side  Heart sounds: Normal heart sounds  Pulmonary:      Effort: Pulmonary effort is normal  No respiratory distress  Breath sounds: Normal breath sounds  Abdominal:      General: Abdomen is flat  Palpations: Abdomen is soft  Tenderness: There is no abdominal tenderness  Musculoskeletal: Normal range of motion  Comments: RUE edema  BC fistula with palpable thrill and audible bruit  Motor sensory intact    Skin:     General: Skin is warm and dry  Neurological:      General: No focal deficit present  Mental Status: She is alert and oriented to person, place, and time  Cranial Nerves: No cranial nerve deficit  Sensory: No sensory deficit  Motor: No weakness     Psychiatric:         Mood and Affect: Mood normal          Behavior: Behavior normal

## 2021-02-05 NOTE — TELEPHONE ENCOUNTER
Called pt and informed of same  Offered ov for 1130 or 3pm today at [place]  Pt states she cannot drive to [place] it's too far, she will see if neighbor will bring her for 3pm ov and call us back  Pt scheduled for 3pm, if unable to make it apt will need to be cx

## 2021-02-05 NOTE — TELEPHONE ENCOUNTER
Patient Radha Morrissey,  1945, was [facility] today [facility]  Yasmeen ordered an AV fistulagram/graftogram for her  She put in for an IR consult  Patients phone number - [phone]    Procedure - IR AV fistulagram/graftogram    Ordering Provider - Eitan Salinas    The patient has no known dye allergy when asked  The patient is currently taking Aspirin 81 mg , and Eliquis 2 5 mg  The patient is waiting for direction from IR doctor on whether to hold the Aspirin and Eliquis kirt-procedure  Also waiting for date, time and which [facility] procedure will be performed  Sent above in e mail to IR scheduling today

## 2021-02-05 NOTE — TELEPHONE ENCOUNTER
She should be elevating and wearing compression wrap given in office   Needs evaluation in office today if possible

## 2021-02-05 NOTE — H&P (VIEW-ONLY)
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, CKD s/p right brachocephalic AVF placement (Doctor 1/5/21)  Patient reports with increased swelling to the RUE and serous drainage from the incision site  Patient has a diagnosis in her chart of chronic subclavian vein thrombosis, however vein mapping from 10/22 demonstrates patent subclavian vein  -Increased swelling in the setting of recent AVF placement of the RUE  -Patient will require venogram with possible intervention to further assess outflow  Will require pre and post procedure hydration   -Incision is well healed  No evidence of infection    -Continue elevation, gentle compression   -Continue ASA, Eliquis  -AVF with palpable thrill and audible bruit   -Discussed with Dr Meryle Molt Doctor       Hypertension  -BP stable in the office today  -Continue adequate BP control  -Medical management per PCP      Hypothyroidism  -Stable  -Continue levothyroxine, regular TSH checks  -Medical management per PCP      Hyperlipemia  -Stable  -Continue statin therapy   -Encourage low cholesterol, low fat diet  -Medical management per PCP         Diagnoses and all orders for this visit:    Stage 5 chronic kidney disease not on chronic dialysis ([facility] [address] )  -     Cancel: IR diagnostic upper extremity venogram; Future  -     Ambulatory referral to Interventional Radiology; Future  -     IR AV fistulagram/graftogram; Future    Essential hypertension    Hypothyroidism, unspecified type    Hyperlipidemia, unspecified hyperlipidemia type          Subjective:      Patient ID: Austin Hunter is a 77 y o  female  Patient presents in office p/o with RUE drainage and swelling  Patient had a AVF created by Dr Meryle Molt Doctor on  01/05/2021  Pt c/o of having swelling and drainage that doesn't come from the incision site   Drainage is a white clear liquid  Patient is currently taking ASA 81 mg, Atorvastatin and Eliquis  80-year-old female with history of CKD s/p right brachiocephalic AV fistula placement in January  Patient has had increased swelling postoperatively  She has been using light compression and elevating her arm  There is a question of compliance  She reports to the office today because she has been having increased swelling and discomfort to the right upper extremity  She has a history of right subclavian vein thrombosis  She denies motor or sensory dysfunction  She denies pain to her fingers or skin discoloration  She does report that she is having increased sweating of the right upper extremity  The following portions of the patient's history were reviewed and updated as appropriate: allergies, current medications, past family history, past medical history, past social history, past surgical history and problem list     Review of Systems   Constitutional: Negative  HENT: Negative  Eyes: Negative  Respiratory: Negative  Cardiovascular: Positive for leg swelling  Gastrointestinal: Negative  Endocrine: Negative  Genitourinary: Negative  Musculoskeletal: Negative  Arm swelling    Skin: Positive for color change (Right arm)  Allergic/Immunologic: Negative  Neurological: Negative  Hematological: Bruises/bleeds easily  Psychiatric/Behavioral: Negative  I have reviewed and made appropriate changes to the review of systems input by the medical assistant      Vitals:    02/05/21 1448   Pulse: 69   Resp: 17   Temp: 98 8 °F (37 1 °C)   TempSrc: Tympanic   Weight: 88 5 kg (195 lb)   Height: 5' (1 524 m)       Patient Active Problem List   Diagnosis    Chronic saddle pulmonary embolism (HCC)    Bladder mass    Small bowel obstruction (HCC)    Obstructive uropathy    Secondary hyperparathyroidism of renal origin ([facility] [address] )    Hypothyroidism    Hypertension    Polycythemia vera [facility])    Fall    Subdural hematoma, acute ([facility] [address] )    Intraventricular hemorrhage (HCC)    Diarrhea    Recurrent Clostridium difficile diarrhea    Anemia in CKD (chronic kidney disease)    Mass of urethra    Stage 5 chronic kidney disease not on chronic [facility] ([facility]    Thoracic compression fracture (HCC)    Hematuria    Abnormal finding on MRI of brain    Benign neoplasm of supratentorial region of brain ([facility] [address] )    M[facility]    UTI (urinary tract infection)    Herpes zoster    Inverted T wave    Polycythemia    Encounter for surgical aftercare following surgery on the digestive system    History of Clostridioides difficile colitis    History of shingles    Depression    Adult BMI 39 0-39 9 kg/sq m    Chronic thrombosis of subclavian vein (HCC)    Swelling of right upper extremity    Hyperlipemia       Past Surgical History:   Procedure Laterality Date    ABDOMINAL ADHESION SURGERY N/A 8/9/2020    Procedure: LYSIS ADHESIONS;  Surgeon: Stephanie Larry DO;  [facility];  Service: General    BLADDER SUSPENSION      BOTOX INJECTION N/A 7/27/2016    Procedure: BOTOX INJECTION ;  Surgeon: Tio Call MD;  [facility];  Service:     CHOLECYSTECTOMY N/A     COLONOSCOPY      CYSTECTOMY, RADICAL WITH ILEOCONDUIT N/A 10/4/2016    Procedure: SUPRATRIGONAL CYSTECTOMY WITH ILEAL CONDUIT ;  Surgeon: Tio Call MD;  [facility];  Service:    Brittney Arellano W/ RETROGRADES Bilateral 7/27/2016    Procedure: CYSTOSCOPY; RETROGRADE PYELOGRAM ;  Surgeon: Tio Call MD;  [facility];  Service:     HERNIA REPAIR      IR NON-TUNNELED CENTRAL LINE PLACEMENT  7/17/2020    IR NON-TUNNELED CENTRAL LINE PLACEMENT  8/14/2020    LAPAROTOMY N/A 8/9/2020    Procedure: LAPAROTOMY EXPLORATORY, PARASTOMAL HERNIA REPAIR WITH MESH;  Surgeon: Stephanie Larry DO;  [facility];  Service: General    HI ANASTOMOSIS,AV,ANY SITE Right 1/5/2021    Procedure: Creation of right brachiobasilic fistula; Surgeon: Mellissa Chairez MD;  [facility];  Service: Vascular    ID COLONOSCOPY FLX DX W/COLLJ SPEC WHEN PFRMD N/A 8/31/2016    Procedure: COLONOSCOPY;  Surgeon: Danni Galeas MD;  Location: [facility]; Service: Gastroenterology    ID CYSTOSCOPY,INSERT URETERAL STENT Bilateral 7/27/2016    Procedure: STENT INSERTION; EXCISION OF MESH ;  Surgeon: Dylon Whitfield MD;  [facility];  Service: Urology    TONSILLECTOMY      TUBAL LIGATION      WISDOM TOOTH EXTRACTION         Family History   Problem Relation Age of Onset    Cancer Mother         small cell cancer     Heart disease Father     COPD Father     Heart disease Brother     Nephrolithiasis Brother        Social History     Socioeconomic History    Marital status:       Spouse name: Not on file    Number of children: Not on file    Years of education: Not on file    Highest education level: Not on file   Occupational History    Not on file   Social Needs    Financial resource strain: Not on file    Food insecurity     Worry: Not on file     Inability: Not on file    Transportation needs     Medical: Not on file     Non-medical: Not on file   Tobacco Use    Smoking status: Never Smoker    Smokeless tobacco: Never Used    Tobacco comment: n/a   Substance and Sexual Activity    Alcohol use: Not Currently     Frequency: Never     Binge frequency: Never     Comment: n/s    Drug use: Not Currently     Comment: n/a    Sexual activity: Not Currently     Partners: Male   Lifestyle    Physical activity     Days per week: Not on file     Minutes per session: Not on file    Stress: Not on file   Relationships    Social connections     Talks on phone: Not on file     Gets together: Not on file     Attends Rastafarian service: Not on file     Active member of club or organization: Not on file     Attends meetings of clubs or organizations: Not on file     Relationship status: Not on file  Intimate partner violence     Fear of current or ex partner: Not on file     Emotionally abused: Not on file     Physically abused: Not on file     Forced sexual activity: Not on file   Other Topics Concern    Not on file   Social History Narrative    Not on file       Allergies   Allergen Reactions    Chlorhexidine Rash     petichi like rash when using chlorhexidine swabs prior to IV  Current Outpatient Medications:     acetaminophen (TYLENOL) 325 mg tablet, 650 mg every 6 hours as needed for mild pain or headache, Disp: 30 tablet, Rfl: 0    aspirin (ECOTRIN LOW STRENGTH) 81 mg EC tablet, Take 1 tablet (81 mg total) by mouth daily, Disp: 90 tablet, Rfl: 4    atorvastatin (LIPITOR) 40 mg tablet, Take 1 tablet (40 mg total) by mouth daily (Patient taking differently: Take 40 mg by mouth daily at bedtime ), Disp: 90 tablet, Rfl: 6    calcitriol (ROCALTROL) 0 25 mcg capsule, TAKE 1 CAPSULE BY MOUTH EVERY OTHER DAY, Disp: 15 capsule, Rfl: 5    Cholecalciferol (VITAMIN D3) 1000 UNITS CAPS, Take 1,000 unit marking on U-100 syringe by mouth daily  , Disp: , Rfl:     citalopram (CeleXA) 20 mg tablet, Take 20 mg by mouth daily , Disp: , Rfl:     Eliquis 2 5 MG, TAKE 1 TABLET BY MOUTH TWICE A DAY, Disp: 60 tablet, Rfl: 5    levothyroxine 75 mcg tablet, Take 75 mcg by mouth daily, Disp: , Rfl: 3    loperamide (IMODIUM) 2 mg capsule, Take 1 capsule (2 mg total) by mouth 4 (four) times a day as needed for diarrhea, Disp: 12 capsule, Rfl: 0    melatonin 3 mg, Take 1 tablet (3 mg total) by mouth daily at bedtime, Disp: 30 tablet, Rfl: 0    metoprolol tartrate (LOPRESSOR) 25 mg tablet, Take 1 tablet (25 mg total) by mouth 2 (two) times a day, Disp: 60 tablet, Rfl: 0    ruxolitinib (Jakafi) 10 mg tablet, Take 1 tablet (10 mg total) by mouth daily, Disp: 30 tablet, Rfl: 3    saccharomyces boulardii (FLORASTOR) 250 mg capsule, Take 1 capsule by mouth daily  , Disp: , Rfl:     Objective:      Pulse 69 Temp 98 8 °F (37 1 °C) (Tympanic)   Resp 17   Ht 5' (1 524 m)   Wt 88 5 kg (195 lb)   BMI 38 08 kg/m²          Physical Exam  Constitutional:       General: She is not in acute distress  Appearance: Normal appearance  She is obese  She is not ill-appearing  HENT:      Head: Normocephalic and atraumatic  Nose: Nose normal       Mouth/Throat:      Mouth: Mucous membranes are moist       Pharynx: Oropharynx is clear  Eyes:      General: No scleral icterus  Extraocular Movements: Extraocular movements intact  Conjunctiva/sclera: Conjunctivae normal    Neck:      Musculoskeletal: Normal range of motion and neck supple  Vascular: No carotid bruit  Cardiovascular:      Rate and Rhythm: Normal rate and regular rhythm  Pulses:           Radial pulses are 1+ on the right side and 2+ on the left side  Heart sounds: Normal heart sounds  Pulmonary:      Effort: Pulmonary effort is normal  No respiratory distress  Breath sounds: Normal breath sounds  Abdominal:      General: Abdomen is flat  Palpations: Abdomen is soft  Tenderness: There is no abdominal tenderness  Musculoskeletal: Normal range of motion  Comments: RUE edema  BC fistula with palpable thrill and audible bruit  Motor sensory intact    Skin:     General: Skin is warm and dry  Neurological:      General: No focal deficit present  Mental Status: She is alert and oriented to person, place, and time  Cranial Nerves: No cranial nerve deficit  Sensory: No sensory deficit  Motor: No weakness     Psychiatric:         Mood and Affect: Mood normal          Behavior: Behavior normal

## 2021-02-05 NOTE — ASSESSMENT & PLAN NOTE
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, CKD s/p right brachocephalic AVF placement (Doctor 1/5/21)  Patient reports with increased swelling to the RUE and serous drainage from the incision site  Patient has a diagnosis in her chart of chronic subclavian vein thrombosis, however vein mapping from 10/22 demonstrates patent subclavian vein  -Increased swelling in the setting of recent AVF placement of the RUE  -Patient will require venogram with possible intervention to further assess outflow  Will require pre and post procedure hydration   -Incision is well healed   No evidence of infection    -Continue elevation, gentle compression   -Continue ASA, Eliquis  -AVF with palpable thrill and audible bruit   -Discussed with Dr Eliane Altman Doctor

## 2021-02-05 NOTE — TELEPHONE ENCOUNTER
Pt called to report her RUE has increased swelling and near elbow is draining clear fluid  This started a few days ago, she describes it as a slow drip   Her incision is well healed and there is no drainage  She states her hand is now swollen and her fingers look like sausages  She is able to move fingers/hand and does not have any pain  ? If she is using compression and elevating as instructed  She d/c compression when the drainage started because it was getting wet  ? What she is using for compression and she is unsure, she states Dr Cristi Irizarry provided her with a wrap and she was told she can wash it but not put it in the dryer  Also she is not elevating arm - she ? How she is suppose to do that  Advised her arm should be elevaed on pillows above heart level  Advised I would notify triage provider for further recommendations

## 2021-02-05 NOTE — PATIENT INSTRUCTIONS
-We will call to schedule venogram, or image of the right arm  -Continue elevation, gentle compression

## 2021-02-08 ENCOUNTER — TELEPHONE (OUTPATIENT)
Dept: INTERVENTIONAL RADIOLOGY/VASCULAR | Facility: HOSPITAL | Age: 76
End: 2021-02-08

## 2021-02-10 ENCOUNTER — HOSPITAL ENCOUNTER (OUTPATIENT)
Dept: INTERVENTIONAL RADIOLOGY/VASCULAR | Facility: HOSPITAL | Age: 76
Discharge: HOME | End: 2021-02-10
Attending: STUDENT IN AN ORGANIZED HEALTH CARE EDUCATION/TRAINING PROGRAM | Admitting: RADIOLOGY
Payer: COMMERCIAL

## 2021-02-10 ENCOUNTER — TELEPHONE (OUTPATIENT)
Dept: INTERVENTIONAL RADIOLOGY/VASCULAR | Facility: HOSPITAL | Age: 76
End: 2021-02-10

## 2021-02-10 VITALS
HEART RATE: 61 BPM | RESPIRATION RATE: 16 BRPM | TEMPERATURE: 97.9 F | BODY MASS INDEX: 38.28 KG/M2 | WEIGHT: 195 LBS | OXYGEN SATURATION: 96 % | DIASTOLIC BLOOD PRESSURE: 92 MMHG | SYSTOLIC BLOOD PRESSURE: 138 MMHG | HEIGHT: 60 IN

## 2021-02-10 DIAGNOSIS — I82.B21: ICD-10-CM

## 2021-02-10 PROCEDURE — 36902 INTRO CATH DIALYSIS CIRCUIT: CPT

## 2021-02-10 PROCEDURE — C1894 INTRO/SHEATH, NON-LASER: HCPCS

## 2021-02-10 PROCEDURE — C1725 CATH, TRANSLUMIN NON-LASER: HCPCS

## 2021-02-10 PROCEDURE — 76937 US GUIDE VASCULAR ACCESS: CPT | Performed by: RADIOLOGY

## 2021-02-10 PROCEDURE — 36902 INTRO CATH DIALYSIS CIRCUIT: CPT | Performed by: RADIOLOGY

## 2021-02-10 PROCEDURE — C1769 GUIDE WIRE: HCPCS

## 2021-02-10 PROCEDURE — 36907 BALO ANGIOP CTR DIALYSIS SEG: CPT

## 2021-02-10 PROCEDURE — 36907 BALO ANGIOP CTR DIALYSIS SEG: CPT | Performed by: RADIOLOGY

## 2021-02-10 RX ORDER — FENTANYL CITRATE 50 UG/ML
INJECTION, SOLUTION INTRAMUSCULAR; INTRAVENOUS CODE/TRAUMA/SEDATION MEDICATION
Status: COMPLETED | OUTPATIENT
Start: 2021-02-10 | End: 2021-02-10

## 2021-02-10 RX ORDER — LIDOCAINE WITH 8.4% SOD BICARB 0.9%(10ML)
SYRINGE (ML) INJECTION CODE/TRAUMA/SEDATION MEDICATION
Status: COMPLETED | OUTPATIENT
Start: 2021-02-10 | End: 2021-02-10

## 2021-02-10 RX ADMIN — Medication 6 ML: at 13:48

## 2021-02-10 RX ADMIN — IOHEXOL 28 ML: 350 INJECTION, SOLUTION INTRAVENOUS at 14:23

## 2021-02-10 RX ADMIN — FENTANYL CITRATE 50 MCG: 50 INJECTION INTRAMUSCULAR; INTRAVENOUS at 13:48

## 2021-02-10 NOTE — INTERVAL H&P NOTE
The history and physical were reviewed, along with progress notes, and the patient was examined  There are no changes since it was written      /83 (BP Location: Left arm)   Pulse 65   Temp (!) 96 6 °F (35 9 °C) (Temporal)   Resp 20   Ht 5' (1 524 m)   Wt 88 5 kg (195 lb)   SpO2 99%   BMI 38 08 kg/m²        Jacobo Landers MD/February 10, 2021/1:22 PM

## 2021-02-10 NOTE — DISCHARGE INSTRUCTIONS
Fistulagram   WHAT YOU NEED TO KNOW:   Your arm or leg my  be sore, swollen, and bruised after the procedure  This is normal and should get better in a few days  DISCHARGE INSTRUCTIONS:     Contact Interventional Radiology at [phone] Bert PATIENTS: Contact Interventional Radiology at [phone]) Franci Padmini PATIENTS: Contact Interventional Radiology at [phone]) if:    · You have a fever or chills  · Your puncture site is red, swollen, or draining pus  · You have nausea or are vomiting  · Your skin is itchy, swollen, or you have a rash  · You cannot feel a thrill over your graft or fistula  · You have questions or concerns about your condition or care  Seek care immediately if:     · You have bleeding that does not stop after 10 minutes of holding firm, direct pressure over the puncture site  · Blood soaks through your bandage  · Your hand or foot closest to the graft or fistula feels cold, painful, or numb  · Your hand or foot closest to the graft or fistula is pale or blue  · You have trouble moving your arm or leg closest to the graft or fistula  · Your bruise suddenly gets bigger  Care for your wound as directed:  Remove the bandage in 4 to 6 hours or as         directed  Wash the area once a day with soap and water  Gently pat the area dry  Apply firm, steady pressure to the puncture site if it bleeds  Use a clean gauze or towel to hold pressure for 10 to 15 minutes  Call 911 if you cannot stop the bleeding or the bleeding gets heavier  Feel for a thrill once a day or as directed  Place your index and second finger over your fistula or graft as directed  You should feel a vibration  The vibration means that blood is flowing through your graft or fistula correctly  Rest your arm or leg as directed  Do not lift anything heavier than 5 pounds or do strenuous activity for 24 hours  Prevent damage to your graft or fistula    Do not wear tight-fitting clothing over your graft or fistula  Do not wear tight jewelry on the arm or leg with the graft or fistula  Tell healthcare providers not to do, IVs, blood draws, and blood pressure readings in the arm with your graft or fistula  Do not allow flu shots or vaccinations in your arm with your graft or fistula  Follow up with your healthcare provider as directed  [facility]:   Your arm or leg my  be sore, swollen, and bruised after the procedure  This is normal and should get better in a few days  DISCHARGE INSTRUCTIONS:     Contact Interventional Radiology at [phone] Bert PATIENTS: Contact Interventional Radiology at [phone]) Neda Dubin PATIENTS: Contact Interventional Radiology at [phone]) if:    · You have a fever or chills  · Your puncture site is red, swollen, or draining pus  · You have nausea or are vomiting  · Your skin is itchy, swollen, or you have a rash  · You cannot feel a thrill over your graft or fistula  · You have questions or concerns about your condition or care  Seek care immediately if:     · You have bleeding that does not stop after 10 minutes of holding firm, direct pressure over the puncture site  · Blood soaks through your bandage  · Your hand or foot closest to the graft or fistula feels cold, painful, or numb  · Your hand or foot closest to the graft or fistula is pale or blue  · You have trouble moving your arm or leg closest to the graft or fistula  · Your bruise suddenly gets bigger  Care for your wound as directed:  Remove the bandage in 4 to 6 hours or as         directed  Wash the area once a day with soap and water  Gently pat the area dry  Apply firm, steady pressure to the puncture site if it bleeds  Use a clean gauze or towel to hold pressure for 10 to 15 minutes  Call 911 if you cannot stop the bleeding or the bleeding gets heavier       Feel for a thrill once a day or as directed  Place your index and second finger over your fistula or graft as directed  You should feel a vibration  The vibration means that blood is flowing through your graft or fistula correctly  Rest your arm or leg as directed  Do not lift anything heavier than 5 pounds or do strenuous activity for 24 hours  Prevent damage to your graft or fistula  Do not wear tight-fitting clothing over your graft or fistula  Do not wear tight jewelry on the arm or leg with the graft or fistula  Tell healthcare providers not to do, IVs, blood draws, and blood pressure readings in the arm with your graft or fistula  Do not allow flu shots or vaccinations in your arm with your graft or fistula      Follow up with your healthcare provider as directed

## 2021-02-10 NOTE — NURSING NOTE
Pt returned to [facility] awake,alert, no complaints, anxious to go home  (+) bruit noted right arm is pink and warm, dressing to fistula gram site is clean, dry, and intact  Post procedural instructions given, pt verbalizes an understanding and voices no questions or complaints, written instructions given

## 2021-03-08 NOTE — BRIEF OP NOTE (RAD/CATH)
INTERVENTIONAL RADIOLOGY PROCEDURE NOTE    Date: 2/10/2021    Procedure: IR AV FISTULAGRAM/GRAFTOGRAM    Preoperative diagnosis:   1  Chronic thrombosis of right subclavian vein (HCC)         Postoperative diagnosis: Same  Surgeon: Judy Mcfarlane MD     Assistant: None  No qualified resident was available  Blood loss: 4ml    Specimens: none     Findings: Complex seneciae in the right axillary vein likely sequela of prior axillary vein DVT with multiple collateral vessels present as well as a high-grade subclavian vein stenosis at the thoracic inlet identified  Maximal dilatation to 10 mm with a good result  Mild residual in the subclavian vein  Strong pulse in the fistula noted to be converted to a thrill post intervention  Fistula noted to be very deep in the arm, up to 4 cm  Some of this may be secondary to the severe arm swelling  Mild inflow stenosis  Not significant  Complications: None immediate      Anesthesia: local and IV Fentanyl Dermal Autograft Text: The defect edges were debeveled with a #15 scalpel blade.  Given the location of the defect, shape of the defect and the proximity to free margins a dermal autograft was deemed most appropriate.  Using a sterile surgical marker, the primary defect shape was transferred to the donor site. The area thus outlined was incised deep to adipose tissue with a #15 scalpel blade.  The harvested graft was then trimmed of adipose and epidermal tissue until only dermis was left.  The skin graft was then placed in the primary defect and oriented appropriately.

## 2021-03-19 ENCOUNTER — APPOINTMENT (OUTPATIENT)
Dept: LAB | Facility: CLINIC | Age: 76
End: 2021-03-19
Payer: COMMERCIAL

## 2021-03-19 DIAGNOSIS — D63.1 ANEMIA IN STAGE 5 CHRONIC KIDNEY DISEASE, NOT ON CHRONIC DIALYSIS (HCC): ICD-10-CM

## 2021-03-19 DIAGNOSIS — N18.5 ANEMIA IN STAGE 5 CHRONIC KIDNEY DISEASE, NOT ON CHRONIC DIALYSIS (HCC): ICD-10-CM

## 2021-03-19 DIAGNOSIS — N18.5 STAGE 5 CHRONIC KIDNEY DISEASE NOT ON CHRONIC DIALYSIS (HCC): ICD-10-CM

## 2021-03-19 LAB
ALBUMIN SERPL BCP-MCNC: 3.3 G/DL (ref 3.5–5)
ANION GAP SERPL CALCULATED.3IONS-SCNC: 10 MMOL/L (ref 4–13)
BASOPHILS # BLD AUTO: 0.06 THOUSANDS/ΜL (ref 0–0.1)
BASOPHILS NFR BLD AUTO: 1 % (ref 0–1)
BUN SERPL-MCNC: 54 MG/DL (ref 5–25)
CALCIUM ALBUM COR SERPL-MCNC: 9 MG/DL (ref 8.3–10.1)
CALCIUM SERPL-MCNC: 8.4 MG/DL (ref 8.3–10.1)
CHLORIDE SERPL-SCNC: 112 MMOL/L (ref 100–108)
CO2 SERPL-SCNC: 19 MMOL/L (ref 21–32)
CREAT SERPL-MCNC: 3.54 MG/DL (ref 0.6–1.3)
EOSINOPHIL # BLD AUTO: 0.09 THOUSAND/ΜL (ref 0–0.61)
EOSINOPHIL NFR BLD AUTO: 2 % (ref 0–6)
ERYTHROCYTE [DISTWIDTH] IN BLOOD BY AUTOMATED COUNT: 14.4 % (ref 11.6–15.1)
GFR SERPL CREATININE-BSD FRML MDRD: 12 ML/MIN/1.73SQ M
GLUCOSE P FAST SERPL-MCNC: 88 MG/DL (ref 65–99)
HCT VFR BLD AUTO: 28.7 % (ref 34.8–46.1)
HGB BLD-MCNC: 8.8 G/DL (ref 11.5–15.4)
IMM GRANULOCYTES # BLD AUTO: 0.02 THOUSAND/UL (ref 0–0.2)
IMM GRANULOCYTES NFR BLD AUTO: 0 % (ref 0–2)
LYMPHOCYTES # BLD AUTO: 1.11 THOUSANDS/ΜL (ref 0.6–4.47)
LYMPHOCYTES NFR BLD AUTO: 25 % (ref 14–44)
MCH RBC QN AUTO: 30.1 PG (ref 26.8–34.3)
MCHC RBC AUTO-ENTMCNC: 30.7 G/DL (ref 31.4–37.4)
MCV RBC AUTO: 98 FL (ref 82–98)
MONOCYTES # BLD AUTO: 0.4 THOUSAND/ΜL (ref 0.17–1.22)
MONOCYTES NFR BLD AUTO: 9 % (ref 4–12)
NEUTROPHILS # BLD AUTO: 2.84 THOUSANDS/ΜL (ref 1.85–7.62)
NEUTS SEG NFR BLD AUTO: 63 % (ref 43–75)
NRBC BLD AUTO-RTO: 0 /100 WBCS
PHOSPHATE SERPL-MCNC: 4.9 MG/DL (ref 2.3–4.1)
PLATELET # BLD AUTO: 282 THOUSANDS/UL (ref 149–390)
PMV BLD AUTO: 10.3 FL (ref 8.9–12.7)
POTASSIUM SERPL-SCNC: 4.7 MMOL/L (ref 3.5–5.3)
PTH-INTACT SERPL-MCNC: 371 PG/ML (ref 18.4–80.1)
RBC # BLD AUTO: 2.92 MILLION/UL (ref 3.81–5.12)
SODIUM SERPL-SCNC: 141 MMOL/L (ref 136–145)
WBC # BLD AUTO: 4.52 THOUSAND/UL (ref 4.31–10.16)

## 2021-03-19 PROCEDURE — 36415 COLL VENOUS BLD VENIPUNCTURE: CPT

## 2021-03-19 PROCEDURE — 80069 RENAL FUNCTION PANEL: CPT

## 2021-03-19 PROCEDURE — 83970 ASSAY OF PARATHORMONE: CPT

## 2021-03-19 PROCEDURE — 85025 COMPLETE CBC W/AUTO DIFF WBC: CPT

## 2021-03-28 DIAGNOSIS — I26.92 CHRONIC SADDLE PULMONARY EMBOLISM WITHOUT ACUTE COR PULMONALE (HCC): Chronic | ICD-10-CM

## 2021-03-28 DIAGNOSIS — I27.82 CHRONIC SADDLE PULMONARY EMBOLISM WITHOUT ACUTE COR PULMONALE (HCC): Chronic | ICD-10-CM

## 2021-03-28 DIAGNOSIS — D45 POLYCYTHEMIA VERA (HCC): ICD-10-CM

## 2021-03-29 RX ORDER — APIXABAN 2.5 MG/1
TABLET, FILM COATED ORAL
Qty: 60 TABLET | Refills: 5 | Status: ON HOLD | OUTPATIENT
Start: 2021-03-29 | End: 2021-08-03 | Stop reason: SDUPTHER

## 2021-03-30 ENCOUNTER — TELEPHONE (OUTPATIENT)
Dept: NEPHROLOGY | Facility: CLINIC | Age: 76
End: 2021-03-30

## 2021-03-30 NOTE — TELEPHONE ENCOUNTER
Pt is calling the office this morning to cancel her f/u appointment with Dr Demi Young  Pt states she feels unwell and has been vomiting  She states that she had been away this past weekend with her grandchildren and thinks she maybe caught a bug  Symptoms started a day ago (3/29)  At this time she is denying uremic symptoms  Given pt advanced ckd I have asked that pt go to the ED for an urgent evaluation should she not see improvement by this afternoon or should she develop worsening symptoms such as increased nausea/vomiting, increased fluid retention, shortness of breath, fatigue or metallic taste in her mouth  Pt has verbalized understanding of all that was discussed  I have also discussed pt symptoms and plan with Dr Demi Young which he is in agreement with

## 2021-03-30 NOTE — TELEPHONE ENCOUNTER
I have discussed recent events as well as plan with Matt Nash MA    patient with advanced chronic kidney disease, history of ileus in the past      low threshold to be evaluated in the emergency department if symptoms persist

## 2021-04-01 ENCOUNTER — APPOINTMENT (EMERGENCY)
Dept: RADIOLOGY | Facility: HOSPITAL | Age: 76
DRG: 389 | End: 2021-04-01
Payer: COMMERCIAL

## 2021-04-01 ENCOUNTER — HOSPITAL ENCOUNTER (INPATIENT)
Facility: HOSPITAL | Age: 76
LOS: 3 days | Discharge: HOME | DRG: 389 | End: 2021-04-04
Attending: EMERGENCY MEDICINE | Admitting: SURGERY
Payer: COMMERCIAL

## 2021-04-01 ENCOUNTER — TELEPHONE (OUTPATIENT)
Dept: NEPHROLOGY | Facility: CLINIC | Age: 76
End: 2021-04-01

## 2021-04-01 DIAGNOSIS — K56.609 SMALL BOWEL OBSTRUCTION (HCC): Primary | ICD-10-CM

## 2021-04-01 LAB
ALBUMIN SERPL BCP-MCNC: 3.7 G/DL (ref 3.5–5)
ALP SERPL-CCNC: 77 U/L (ref 46–116)
ALT SERPL W P-5'-P-CCNC: 17 U/L (ref 12–78)
ANION GAP SERPL CALCULATED.3IONS-SCNC: 7 MMOL/L (ref 4–13)
APTT PPP: 34 SECONDS (ref 23–37)
AST SERPL W P-5'-P-CCNC: 24 U/L (ref 5–45)
BASOPHILS # BLD AUTO: 0.02 THOUSANDS/ΜL (ref 0–0.1)
BASOPHILS NFR BLD AUTO: 0 % (ref 0–1)
BILIRUB SERPL-MCNC: 0.71 MG/DL (ref 0.2–1)
BUN SERPL-MCNC: 49 MG/DL (ref 5–25)
CALCIUM SERPL-MCNC: 9.2 MG/DL (ref 8.3–10.1)
CHLORIDE SERPL-SCNC: 110 MMOL/L (ref 100–108)
CO2 SERPL-SCNC: 22 MMOL/L (ref 21–32)
CREAT SERPL-MCNC: 3.72 MG/DL (ref 0.6–1.3)
EOSINOPHIL # BLD AUTO: 0.05 THOUSAND/ΜL (ref 0–0.61)
EOSINOPHIL NFR BLD AUTO: 1 % (ref 0–6)
ERYTHROCYTE [DISTWIDTH] IN BLOOD BY AUTOMATED COUNT: 14.2 % (ref 11.6–15.1)
GFR SERPL CREATININE-BSD FRML MDRD: 11 ML/MIN/1.73SQ M
GLUCOSE SERPL-MCNC: 102 MG/DL (ref 65–140)
HCT VFR BLD AUTO: 31.4 % (ref 34.8–46.1)
HGB BLD-MCNC: 9.9 G/DL (ref 11.5–15.4)
IMM GRANULOCYTES # BLD AUTO: 0.03 THOUSAND/UL (ref 0–0.2)
IMM GRANULOCYTES NFR BLD AUTO: 0 % (ref 0–2)
INR PPP: 1.48 (ref 0.84–1.19)
LACTATE SERPL-SCNC: 1.5 MMOL/L (ref 0.5–2)
LYMPHOCYTES # BLD AUTO: 0.72 THOUSANDS/ΜL (ref 0.6–4.47)
LYMPHOCYTES NFR BLD AUTO: 10 % (ref 14–44)
MCH RBC QN AUTO: 30.7 PG (ref 26.8–34.3)
MCHC RBC AUTO-ENTMCNC: 31.5 G/DL (ref 31.4–37.4)
MCV RBC AUTO: 97 FL (ref 82–98)
MONOCYTES # BLD AUTO: 0.55 THOUSAND/ΜL (ref 0.17–1.22)
MONOCYTES NFR BLD AUTO: 8 % (ref 4–12)
NEUTROPHILS # BLD AUTO: 5.66 THOUSANDS/ΜL (ref 1.85–7.62)
NEUTS SEG NFR BLD AUTO: 81 % (ref 43–75)
NRBC BLD AUTO-RTO: 0 /100 WBCS
PLATELET # BLD AUTO: 364 THOUSANDS/UL (ref 149–390)
PMV BLD AUTO: 9.8 FL (ref 8.9–12.7)
POTASSIUM SERPL-SCNC: 4.3 MMOL/L (ref 3.5–5.3)
PROT SERPL-MCNC: 7.1 G/DL (ref 6.4–8.2)
PROTHROMBIN TIME: 17.9 SECONDS (ref 11.6–14.5)
RBC # BLD AUTO: 3.23 MILLION/UL (ref 3.81–5.12)
SODIUM SERPL-SCNC: 139 MMOL/L (ref 136–145)
WBC # BLD AUTO: 7.03 THOUSAND/UL (ref 4.31–10.16)

## 2021-04-01 PROCEDURE — 99285 EMERGENCY DEPT VISIT HI MDM: CPT | Performed by: PHYSICIAN ASSISTANT

## 2021-04-01 PROCEDURE — 80053 COMPREHEN METABOLIC PANEL: CPT | Performed by: PHYSICIAN ASSISTANT

## 2021-04-01 PROCEDURE — 99285 EMERGENCY DEPT VISIT HI MDM: CPT

## 2021-04-01 PROCEDURE — G1004 CDSM NDSC: HCPCS

## 2021-04-01 PROCEDURE — 85730 THROMBOPLASTIN TIME PARTIAL: CPT | Performed by: STUDENT IN AN ORGANIZED HEALTH CARE EDUCATION/TRAINING PROGRAM

## 2021-04-01 PROCEDURE — 74176 CT ABD & PELVIS W/O CONTRAST: CPT

## 2021-04-01 PROCEDURE — 99222 1ST HOSP IP/OBS MODERATE 55: CPT | Performed by: SURGERY

## 2021-04-01 PROCEDURE — 85025 COMPLETE CBC W/AUTO DIFF WBC: CPT | Performed by: PHYSICIAN ASSISTANT

## 2021-04-01 PROCEDURE — 85610 PROTHROMBIN TIME: CPT | Performed by: STUDENT IN AN ORGANIZED HEALTH CARE EDUCATION/TRAINING PROGRAM

## 2021-04-01 PROCEDURE — 36415 COLL VENOUS BLD VENIPUNCTURE: CPT | Performed by: PHYSICIAN ASSISTANT

## 2021-04-01 PROCEDURE — 83605 ASSAY OF LACTIC ACID: CPT | Performed by: PHYSICIAN ASSISTANT

## 2021-04-01 RX ORDER — HEPARIN SODIUM 10000 [USP'U]/100ML
3-30 INJECTION, SOLUTION INTRAVENOUS
Status: DISCONTINUED | OUTPATIENT
Start: 2021-04-01 | End: 2021-04-02

## 2021-04-01 RX ORDER — METOPROLOL TARTRATE 5 MG/5ML
5 INJECTION INTRAVENOUS EVERY 6 HOURS
Status: DISCONTINUED | OUTPATIENT
Start: 2021-04-01 | End: 2021-04-02

## 2021-04-01 RX ORDER — ONDANSETRON 2 MG/ML
4 INJECTION INTRAMUSCULAR; INTRAVENOUS EVERY 6 HOURS PRN
Status: DISCONTINUED | OUTPATIENT
Start: 2021-04-01 | End: 2021-04-04 | Stop reason: HOSPADM

## 2021-04-01 RX ORDER — SODIUM CHLORIDE, SODIUM LACTATE, POTASSIUM CHLORIDE, CALCIUM CHLORIDE 600; 310; 30; 20 MG/100ML; MG/100ML; MG/100ML; MG/100ML
125 INJECTION, SOLUTION INTRAVENOUS CONTINUOUS
Status: DISCONTINUED | OUTPATIENT
Start: 2021-04-01 | End: 2021-04-02

## 2021-04-01 RX ADMIN — SODIUM CHLORIDE, SODIUM LACTATE, POTASSIUM CHLORIDE, AND CALCIUM CHLORIDE 125 ML/HR: .6; .31; .03; .02 INJECTION, SOLUTION INTRAVENOUS at 16:50

## 2021-04-01 RX ADMIN — ONDANSETRON 4 MG: 2 INJECTION INTRAMUSCULAR; INTRAVENOUS at 16:50

## 2021-04-01 RX ADMIN — HEPARIN SODIUM 18 UNITS/KG/HR: 10000 INJECTION, SOLUTION INTRAVENOUS at 16:50

## 2021-04-01 RX ADMIN — METOROPROLOL TARTRATE 5 MG: 5 INJECTION, SOLUTION INTRAVENOUS at 16:50

## 2021-04-01 RX ADMIN — METOROPROLOL TARTRATE 5 MG: 5 INJECTION, SOLUTION INTRAVENOUS at 22:25

## 2021-04-01 NOTE — TELEPHONE ENCOUNTER
I received a call from patient stating that she has not been feeling well for a few days now and is heading to the ED at [facility]  She wanted you to be made aware of the situation

## 2021-04-01 NOTE — H&P
H&P Exam - General Surgery   Yesica Sands 77 y o  female MRN: [id number]  Unit/Bed#: Tracie Mckeon Encounter: [id number]    Assessment/Plan     Assessment:  77 y o  F with multiple previous abdominal surgeries including exploratory laparotomy, parastomal hernia repair, LALO 8/10/20, presents with recurrent SBO  Afebrile  VSS  WBC 7K  Epigastric tenderness, but without peritonitis     Plan:  Patient with multiple prior abdominal surgeries, presents with a recurrent SBO  No signs of peritonitis on exam  CTAP with transition point in LLQ  Will attempt conservative management of SBO  Acute care surgery admission  NPO/NGT  Hi@[employer]  Hold Eliquis, will start Heparin gtt given recent hx of DVT  Serial abdominal exams  DVT ppx      History of Present Illness      HPI:  Yesica Sands is a 77 y o  female with PMHx of DVT/PE on Eliquis, polycythemia vera, HTN, HLD, CKD, multiple prior abdominal surgeries including cystectomy with ileal conduit 2016, cholecystectomy, tubal ligation, also with hx of recurrent SBOs s/p ex lap, parastomal hernia repair with mesh, LALO 8/10/2020 who presents with a few days of abdominal pain, decreased appetite, constipation and obstipation  Patient states her abdominal pain started on Monday  She has been unable to tolerate solid foods over the last few days, but has been able to keep down fluids  He abdominal pain is intermittent, located in the epigastric region  Her last BM was Monday  She has been intermittently passing gas over the last few days  Pain persisted into today, which prompted her visit to the ED this afternoon  CTAP performed in the ED showed a high grade SBO with transition point in the pelvis  General surgery was consulted for management  Review of Systems   Constitutional: Negative for chills and fever  HENT: Negative for ear pain and sore throat  Eyes: Negative for pain and visual disturbance  Respiratory: Negative for cough and shortness of breath  Cardiovascular: Negative for chest pain and palpitations  Gastrointestinal: Positive for abdominal pain, constipation and nausea  Negative for vomiting  Genitourinary: Negative for dysuria and hematuria  Musculoskeletal: Negative for arthralgias and back pain  Skin: Negative for color change and rash  Neurological: Negative for seizures and syncope  All other systems reviewed and are negative        Historical Information   Past Medical History:   Diagnosis Date    Anxiety     Bowel obstruction (HCC)     Chronic kidney disease (CKD), stage IV (severe) (HCC)     stage IV    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    Circulation problem     Compression fracture of cervical spine (HCC)     Hernia of abdominal cavity     History of kidney problems     Hydronephrosis     Hypertension     IBS (irritable bowel syndrome)     Incontinence     Lung mass     Improving on PET/CT 1/2016    Polycy[facility]mia vera ([facility] )     Pulmonary embolism ([facility] [address] ) 2014    Shingles     Urinary tract infection      Past Surgical History:   Procedure Laterality Date    ABDOMINAL ADHESION SURGERY N/A 8/9/2020    Procedure: LYSIS ADHESIONS;  Surgeon: Rubi Langley DO;  [facility];  Service: General    BLADDER SUSPENSION      BOTOX INJECTION N/A 7/27/2016    Procedure: BOTOX INJECTION ;  Surgeon: Kaity Reina MD;  [facility];  Service:     CHOLECYSTECTOMY N/A     COLONOSCOPY      CYSTECTOMY, RADICAL WITH ILEOCONDUIT N/A 10/4/2016    Procedure: Edwina Carrel ;  Surgeon: Kaity Reina MD;  [facility];  Service:    Emma Davidson W/ RETROGRADES Bilateral 7/27/2016    Procedure: CYSTOSCOPY; RETROGRADE PYELOGRAM ;  Surgeon: Kaity Reina MD;  [facility];  Service:     HERNIA REPAIR      IR AV FISTULAGRAM/GRAFTOGRAM  2/10/2021    IR NON-TUNNELED CENTRAL LINE PLACEMENT  7/17/2020    IR NON-TUNNELED CENTRAL LINE PLACEMENT  8/14/2020  LAPAROTOMY N/A 2020    Procedure: LAPAROTOMY EXPLORATORY, PARASTOMAL HERNIA REPAIR WITH MESH;  Surgeon: Yudy Her DO;  [facility];  Service: General    HI ANASTOMOSIS,AV,ANY SITE Right 2021    Procedure: Creation of right brachiobasilic fistula; Surgeon: Em Chairez MD;  [facility];  Service: Vascular    HI COLONOSCOPY FLX DX W/COLLJ SPEC WHEN PFRMD N/A 2016    Procedure: COLONOSCOPY;  Surgeon: Eliane Scott MD;  [facility]; Service: Gastroenterology    HI CYSTOSCOPY,INSERT URETERAL STENT Bilateral 2016    Procedure: STENT INSERTION; EXCISION OF MESH ;  Surgeon: Felicitas Wheeler MD;  [facility];  Service: Urology    TONSILLECTOMY      TUBAL LIGATION      WISDOM TOOTH EXTRACTION       Social History   Social History     Substance and Sexual Activity   Alcohol Use Not Currently    Frequency: Never    Binge frequency: Never    Comment: n/s     Social History     Substance and Sexual Activity   Drug Use Not Currently    Comment: n/a     Social History     Tobacco Use   Smoking Status Never Smoker   Smokeless Tobacco Never Used   Tobacco Comment    n/a     E-Cigarette/Vaping    E-Cigarette Use Never User      E-Cigarette/Vaping Substances    Nicotine No     THC No     CBD No     Flavoring No     Other No     Unknown No      Family History: non-contributory    Meds/Allergies   PTA meds:   Prior to Admission Medications   Prescriptions Last Dose Informant Patient Reported? Taking? Cholecalciferol (VITAMIN D3) 1000 UNITS CAPS 2021 at Unknown time Self Yes Yes   Sig: Take 1,000 unit marking on U-100 syringe by mouth daily     Eliquis 2 5 MG 2021 at Unknown time  No Yes   Sig: TAKE 1 TABLET BY MOUTH TWICE A DAY   acetaminophen (TYLENOL) 325 mg tablet More than a month at Unknown time Self No No   Si mg every 6 hours as needed for mild pain or headache   aspirin (ECOTRIN LOW STRENGTH) 81 mg EC tablet 2021 at Unknown time Self No Yes   Sig: Take 1 tablet (81 mg total) by mouth daily   atorvastatin (LIPITOR) 40 mg tablet 3/31/2021 at Unknown time Self No Yes   Sig: Take 1 tablet (40 mg total) by mouth daily   Patient taking differently: Take 40 mg by mouth daily at bedtime    calcitriol (ROCALTROL) 0 25 mcg capsule 3/31/2021 at Unknown time Self No Yes   Sig: TAKE 1 CAPSULE BY MOUTH EVERY OTHER DAY   citalopram (CeleXA) 20 mg tablet 4/1/2021 at Unknown time Self Yes Yes   Sig: Take 20 mg by mouth daily    levothyroxine 75 mcg tablet 4/1/2021 at Unknown time Self Yes Yes   Sig: Take 75 mcg by mouth daily   loperamide (IMODIUM) 2 mg capsule Unknown at Unknown time Self No No   Sig: Take 1 capsule (2 mg total) by mouth 4 (four) times a day as needed for diarrhea   melatonin 3 mg 3/31/2021 at Unknown time Self No Yes   Sig: Take 1 tablet (3 mg total) by mouth daily at bedtime   metoprolol tartrate (LOPRESSOR) 25 mg tablet 4/1/2021 at Unknown time Self No Yes   Sig: Take 1 tablet (25 mg total) by mouth 2 (two) times a day   ruxolitinib (Jakafi) 10 mg tablet 4/1/2021 at Unknown time Self No Yes   Sig: Take 1 tablet (10 mg total) by mouth daily   saccharomyces boulardii (FLORASTOR) 250 mg capsule 3/31/2021 at Unknown time Self Yes Yes   Sig: Take 1 capsule by mouth daily        Facility-Administered Medications: None     Allergies   Allergen Reactions    Chlorhexidine Rash     petichi like rash when using chlorhexidine swabs prior to IV          Objective   First Vitals:   Blood Pressure: 146/82 (04/01/21 1220)  Pulse: 81 (04/01/21 1220)  Temperature: 98 1 °F (36 7 °C) (04/01/21 1220)  Temp Source: Oral (04/01/21 1220)  Respirations: 18 (04/01/21 1220)  Height: 5' 2" (157 5 cm) (04/01/21 1220)  Weight - Scale: 86 2 kg (190 lb 0 6 oz) (04/01/21 1220)  SpO2: 97 % (04/01/21 1220)    Current Vitals:   Blood Pressure: 146/82 (04/01/21 1220)  Pulse: 81 (04/01/21 1220)  Temperature: 98 1 °F (36 7 °C) (04/01/21 1220)  Temp Source: Oral (04/01/21 1220)  Respirations: 18 (04/01/21 1220)  Height: 5' 2" (157 5 cm) (04/01/21 1220)  Weight - Scale: 86 2 kg (190 lb 0 6 oz) (04/01/21 1220)  SpO2: 97 % (04/01/21 1220)    No intake or output data in the 24 hours ending 04/01/21 1506    Invasive Devices     Drain            Urostomy Ileal conduit RUQ 1639 days                Physical Exam  Vitals signs and nursing note reviewed  Constitutional:       General: She is not in acute distress  Appearance: She is well-developed  HENT:      Head: Normocephalic and atraumatic  Eyes:      Conjunctiva/sclera: Conjunctivae normal    Neck:      Musculoskeletal: Neck supple  Cardiovascular:      Rate and Rhythm: Normal rate and regular rhythm  Heart sounds: No murmur  Pulmonary:      Effort: Pulmonary effort is normal  No respiratory distress  Breath sounds: Normal breath sounds  Abdominal:      Palpations: Abdomen is soft  Tenderness: There is abdominal tenderness (epigastric)  There is no guarding or rebound  Comments: Mild upper abdominal distention  Musculoskeletal: Normal range of motion  Skin:     General: Skin is warm and dry  Capillary Refill: Capillary refill takes less than 2 seconds  Neurological:      Mental Status: She is alert and oriented to person, place, and time     Psychiatric:         Mood and Affect: Mood normal          Behavior: Behavior normal          Lab Results:   CBC:   Lab Results   Component Value Date    WBC 7 03 04/01/2021    HGB 9 9 (L) 04/01/2021    HCT 31 4 (L) 04/01/2021    MCV 97 04/01/2021     04/01/2021    MCH 30 7 04/01/2021    MCHC 31 5 04/01/2021    RDW 14 2 04/01/2021    MPV 9 8 04/01/2021    NRBC 0 04/01/2021   , CMP:   Lab Results   Component Value Date    SODIUM 139 04/01/2021    K 4 3 04/01/2021     (H) 04/01/2021    CO2 22 04/01/2021    BUN 49 (H) 04/01/2021    CREATININE 3 72 (H) 04/01/2021    CALCIUM 9 2 04/01/2021    AST 24 04/01/2021    ALT 17 04/01/2021    ALKPHOS 77 04/01/2021    EGFR 11 04/01/2021     Imaging: I have personally reviewed pertinent films in PACS  EKG, Pathology, and Other Studies: I have personally reviewed pertinent reports  Code Status: Prior  Advance Directive and Living Will:      Power of :    POLST:      Counseling / Coordination of Care  Total floor / unit time spent today 30 minutes  Greater than 50% of total time was spent with the patient and / or family counseling and / or coordination of care    A description of the counseling / coordination of care: discussion with patient and surgical team

## 2021-04-01 NOTE — ED PROVIDER NOTES
History  Chief Complaint   Patient presents with    Abdominal Pain     Pt c/o epigastric pain, vomiting, constipation since Tuesday  Hx bowel obstruction 6 months ago  Moira Bernstein is a 76 yo who presents to the ED today with abdominal pain  Had an obstruction with hernia 6 months ago and surgery  Has a stoma  Patient hasn't had a BM since Monday  Nausea and brown emesis  No fevers  Has been having n/v  But is feeling better today compared tot he past 1 week since this has been going on  States she has an appetite today  Passing very small amount of gas  Doesn't recall the name of her surgeon  Patient denies chest pain, cough, and sob  On Eliquis and Asprin  Has "really bad kidneys" cancelled upcoming appointment with nephrologist  States she is on the verge of dialysis  Prior to Admission Medications   Prescriptions Last Dose Informant Patient Reported? Taking? Cholecalciferol (VITAMIN D3) 1000 UNITS CAPS 2021 at Unknown time Self Yes Yes   Sig: Take 1,000 unit marking on U-100 syringe by mouth daily     Eliquis 2 5 MG 2021 at Unknown time  No Yes   Sig: TAKE 1 TABLET BY MOUTH TWICE A DAY   acetaminophen (TYLENOL) 325 mg tablet More than a month at Unknown time Self No No   Si mg every 6 hours as needed for mild pain or headache   aspirin (ECOTRIN LOW STRENGTH) 81 mg EC tablet 2021 at Unknown time Self No Yes   Sig: Take 1 tablet (81 mg total) by mouth daily   atorvastatin (LIPITOR) 40 mg tablet 3/31/2021 at Unknown time Self No Yes   Sig: Take 1 tablet (40 mg total) by mouth daily   Patient taking differently: Take 40 mg by mouth daily at bedtime    calcitriol (ROCALTROL) 0 25 mcg capsule 3/31/2021 at Unknown time Self No Yes   Sig: TAKE 1 CAPSULE BY MOUTH EVERY OTHER DAY   citalopram (CeleXA) 20 mg tablet 2021 at Unknown time Self Yes Yes   Sig: Take 20 mg by mouth daily    levothyroxine 75 mcg tablet 2021 at Unknown time Self Yes Yes   Sig: Take 75 mcg by mouth daily loperamide (IMODIUM) 2 mg capsule Unknown at Unknown time Self No No   Sig: Take 1 capsule (2 mg total) by mouth 4 (four) times a day as needed for diarrhea   melatonin 3 mg 3/31/2021 at Unknown time Self No Yes   Sig: Take 1 tablet (3 mg total) by mouth daily at bedtime   metoprolol tartrate (LOPRESSOR) 25 mg tablet 4/1/2021 at Unknown time Self No Yes   Sig: Take 1 tablet (25 mg total) by mouth 2 (two) times a day   ruxolitinib (Jakafi) 10 mg tablet 4/1/2021 at Unknown time Self No Yes   Sig: Take 1 tablet (10 mg total) by mouth daily   saccharomyces boulardii (FLORASTOR) 250 mg capsule 3/31/2021 at Unknown time Self Yes Yes   Sig: Take 1 capsule by mouth daily        Facility-Administered Medications: None       Past Medical History:   Diagnosis Date    Anxiety     Bowel obstruction (HCC)     Chronic kidney disease (CKD), stage IV (severe) (HCC)     stage IV    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    Circulation problem     Compression fracture of cervical spine (HCC)     Hernia of abdominal cavity     History of kidney problems     Hydronephrosis     Hypertension     IBS (irritable bowel syndrome)     Incontinence     Lung mass     Improving on PET/CT 1/2016    [facility] [address]     Pulmonary embolism ([facility] [address] ) 2014    Shingles     Urinary tract infection        Past Surgical History:   Procedure Laterality Date    ABDOMINAL ADHESION SURGERY N/A 8/9/2020    Procedure: LYSIS ADHESIONS;  Surgeon: John Mckinney DO;  [facility];  Service: General    BLADDER SUSPENSION      BOTOX INJECTION N/A 7/27/2016    Procedure: BOTOX INJECTION ;  Surgeon: Lexus Ny MD;  [facility];  Service:     CHOLECYSTECTOMY N/A     COLONOSCOPY      CYSTECTOMY, RADICAL WITH ILEOCONDUIT N/A 10/4/2016    Procedure: Jeannette Christos ;  Surgeon: Lexus Ny MD;  [facility];  Service:    Lam Burgos CYSTOSCOPY W/ RETROGRADES Bilateral 7/27/2016    Procedure: Laurent Musty; RETROGRADE PYELOGRAM ;  Surgeon: Benedicto Manriquez MD;  [facility];  Service:     HERNIA REPAIR      IR AV FISTULAGRAM/GRAFTOGRAM  2/10/2021    IR NON-TUNNELED CENTRAL LINE PLACEMENT  7/17/2020    IR NON-TUNNELED CENTRAL LINE PLACEMENT  8/14/2020    LAPAROTOMY N/A 8/9/2020    Procedure: LAPAROTOMY EXPLORATORY, PARASTOMAL HERNIA REPAIR WITH MESH;  Surgeon: Criselda Lisa DO;  [facility];  Service: General    MT ANASTOMOSIS,AV,ANY SITE Right 1/5/2021    Procedure: Creation of right brachiobasilic fistula; Surgeon: Zach Chairez MD;  [facility];  Service: Vascular    MT COLONOSCOPY FLX DX W/COLLJ SPEC WHEN PFRMD N/A 8/31/2016    Procedure: COLONOSCOPY;  Surgeon: Mir Vallejo MD;  [facility]; Service: Gastroenterology    MT CYSTOSCOPY,INSERT URETERAL STENT Bilateral 7/27/2016    Procedure: STENT INSERTION; EXCISION OF MESH ;  Surgeon: Benedicto Manriquez MD;  [facility];  Service: Urology    TONSILLECTOMY      TUBAL LIGATION      WISDOM TOOTH EXTRACTION         Family History   Problem Relation Age of Onset    Cancer Mother         small cell cancer     Heart disease Father     COPD Father     Heart disease Brother     Nephrolithiasis Brother      I have reviewed and agree with the history as documented  E-Cigarette/Vaping    E-Cigarette Use Never User      E-Cigarette/Vaping Substances    Nicotine No     THC No     CBD No     Flavoring No     Other No     Unknown No      Social History     Tobacco Use    Smoking status: Never Smoker    Smokeless tobacco: Never Used    Tobacco comment: n/a   Substance Use Topics    Alcohol use: Not Currently     Frequency: Never     Binge frequency: Never     Comment: n/s    Drug use: Not Currently     Comment: n/a       Review of Systems   Constitutional: Positive for appetite change  Negative for activity change, fatigue and fever     Respiratory: Negative for cough and shortness of breath  Cardiovascular: Negative for chest pain  Gastrointestinal: Positive for abdominal pain, constipation, nausea and vomiting  Genitourinary: Negative for hematuria  Allergic/Immunologic: Negative for immunocompromised state  Neurological: Negative for syncope and headaches  Psychiatric/Behavioral: Negative for confusion  All other systems reviewed and are negative  Physical Exam  Physical Exam  Vitals signs and nursing note reviewed  Constitutional:       General: She is not in acute distress  Appearance: She is well-developed  She is not toxic-appearing or diaphoretic  HENT:      Head: Normocephalic and atraumatic  Eyes:      Conjunctiva/sclera: Conjunctivae normal    Neck:      Musculoskeletal: Normal range of motion and neck supple  Cardiovascular:      Rate and Rhythm: Normal rate and regular rhythm  Heart sounds: Normal heart sounds  No murmur  Pulmonary:      Effort: Pulmonary effort is normal  No respiratory distress  Breath sounds: Normal breath sounds  Abdominal:      General: A surgical scar is present  Bowel sounds are increased  There is distension  Palpations: Abdomen is soft  Tenderness: There is abdominal tenderness in the right upper quadrant  There is guarding  There is no rebound  Hernia: A hernia is present  Hernia is present in the ventral area  Comments: High pitched sounds   Musculoskeletal: Normal range of motion  Skin:     General: Skin is warm and dry  Neurological:      Mental Status: She is alert and oriented to person, place, and time  Cranial Nerves: No cranial nerve deficit     Psychiatric:         Behavior: Behavior normal          Vital Signs  ED Triage Vitals [04/01/21 1220]   Temperature Pulse Respirations Blood Pressure SpO2   98 1 °F (36 7 °C) 81 18 146/82 97 %      Temp Source Heart Rate Source Patient Position - Orthostatic VS BP Location FiO2 (%)   Oral Monitor Sitting Right arm -- Pain Score       No Pain           Vitals:    04/01/21 1220   BP: 146/82   Pulse: 81   Patient Position - Orthostatic VS: Sitting         Visual Acuity      ED Medications  Medications   lactated ringers infusion (has no administration in time range)   ondansetron (ZOFRAN) injection 4 mg (has no administration in time range)   metoprolol (LOPRESSOR) injection 5 mg (has no administration in time range)   heparin (porcine) 25,000 units in 0 45% NaCl 250 mL infusion (premix) (has no administration in time range)       Diagnostic Studies  Results Reviewed     Procedure Component Value Units Date/Time    APTT [[id number]] Collected: 04/01/21 1612    Lab Status: In process Specimen: Blood from Arm, Left Updated: 04/01/21 [address] [[id number]] Collected: 04/01/21 1612    Lab Status: In process Specimen: Blood from Arm, Left Updated: 04/01/21 1627    CBC [[id number]] Updated: 04/01/21 1627    Lab Status: In process Specimen: Blood     Lactic acid [[id number]]  (Normal) Collected: 04/01/21 1256    Lab Status: Final result Specimen: Blood from Arm, Right Updated: 04/01/21 1335     LACTIC ACID 1 5 mmol/L     Narrative:      Result may be elevated if tourniquet was used during collection      Comprehensive metabolic panel [[id number]]  (Abnormal) Collected: 04/01/21 1256    Lab Status: Final result Specimen: Blood from Arm, Right Updated: 04/01/21 1331     Sodium 139 mmol/L      Potassium 4 3 mmol/L      Chloride 110 mmol/L      CO2 22 mmol/L      ANION GAP 7 mmol/L      BUN 49 mg/dL      Creatinine 3 72 mg/dL      Glucose 102 mg/dL      Calcium 9 2 mg/dL      AST 24 U/L      ALT 17 U/L      Alkaline Phosphatase 77 U/L      Total Protein 7 1 g/dL      Albumin 3 7 g/dL      Total Bilirubin 0 71 mg/dL      eGFR 11 ml/min/1 73sq m     Narrative:      [place] guidelines for Chronic Kidney Disease (CKD):     Stage 1 with normal or high GFR (GFR > 90 mL/min/1 73 square meters)    Stage 2 Mild CKD (GFR = 60-89 mL/min/1 73 square meters)    Stage 3A Moderate CKD (GFR = 45-59 mL/min/1 73 square meters)    Stage 3B Moderate CKD (GFR = 30-44 mL/min/1 73 square meters)    Stage 4 Severe CKD (GFR = 15-29 mL/min/1 73 square meters)    Stage 5 End Stage CKD (GFR <15 mL/min/1 73 square meters)  Note: GFR calculation is accurate only with a steady state creatinine    CBC and differential [[id number]]  (Abnormal) Collected: 04/01/21 1256    Lab Status: Final result Specimen: Blood from Arm, Right Updated: 04/01/21 1311     WBC 7 03 Thousand/uL      RBC 3 23 Million/uL      Hemoglobin 9 9 g/dL      Hematocrit 31 4 %      MCV 97 fL      MCH 30 7 pg      MCHC 31 5 g/dL      RDW 14 2 %      MPV 9 8 fL      Platelets 463 Thousands/uL      nRBC 0 /100 WBCs      Neutrophils Relative 81 %      Immat GRANS % 0 %      Lymphocytes Relative 10 %      Monocytes Relative 8 %      Eosinophils Relative 1 %      Basophils Relative 0 %      Neutrophils Absolute 5 66 Thousands/µL      Immature Grans Absolute 0 03 Thousand/uL      Lymphocytes Absolute 0 72 Thousands/µL      Monocytes Absolute 0 55 Thousand/µL      Eosinophils Absolute 0 05 Thousand/µL      Basophils Absolute 0 02 Thousands/µL                  CT abdomen pelvis wo contrast   ED Interpretation by Sidonie Dubin, PA-C (04/01 9231)   +obs      Final Result by Kristi Saravia MD (04/01 7994)      High-grade versus complete distal small bowel obstruction with a transition point in the left lower quadrant  Left renal atrophy  Bilateral nonobstructing renal calculi  Status post cystectomy  Cystic structure in the right lower quadrant consistent with a fluid-filled ileal conduit, ovarian/paraovarian cyst, or dilated fallopian tube which has increased in size since July 14, 2015  The study was marked in [facility] for immediate notification              Workstation performed: [id number]                    Procedures  Procedures         ED Course  ED Course as of Apr 01 1648   Thu Apr 01, 2021   1249 Left vm for surg red team      [phone] Red team coming down for eval      1442 CT abdomen pelvis wo contrast                                           MDM    Disposition  Final diagnoses:   Small bowel obstruction ([address] )     Time reflects when diagnosis was documented in both MDM as applicable and the Disposition within this note     Time User Action Codes Description Comment    4/1/2021  4:48 PM Princebette Celaya Add [K56 [id number]] Small bowel obstruction [facility])       ED Disposition     ED Disposition Condition Date/Time Comment    Admit Stable Thu Apr 1, 2021  4:48 PM Case was discussed with  red surgical team        Follow-up Information    None         Patient's Medications   Discharge Prescriptions    No medications on file     No discharge procedures on file      PDMP Review       Value Time User    PDMP Reviewed  Yes 1/5/2021 12:26 Xavier Chairez MD          ED Provider  Electronically Signed by           Andres Edwards PA-C  04/01/21 0928

## 2021-04-02 ENCOUNTER — APPOINTMENT (INPATIENT)
Dept: RADIOLOGY | Facility: HOSPITAL | Age: 76
DRG: 389 | End: 2021-04-02
Payer: COMMERCIAL

## 2021-04-02 LAB
ANION GAP SERPL CALCULATED.3IONS-SCNC: 10 MMOL/L (ref 4–13)
BASOPHILS # BLD AUTO: 0.02 THOUSANDS/ΜL (ref 0–0.1)
BASOPHILS NFR BLD AUTO: 0 % (ref 0–1)
BUN SERPL-MCNC: 48 MG/DL (ref 5–25)
CALCIUM SERPL-MCNC: 8.9 MG/DL (ref 8.3–10.1)
CHLORIDE SERPL-SCNC: 112 MMOL/L (ref 100–108)
CO2 SERPL-SCNC: 20 MMOL/L (ref 21–32)
CREAT SERPL-MCNC: 3.41 MG/DL (ref 0.6–1.3)
EOSINOPHIL # BLD AUTO: 0.03 THOUSAND/ΜL (ref 0–0.61)
EOSINOPHIL NFR BLD AUTO: 1 % (ref 0–6)
ERYTHROCYTE [DISTWIDTH] IN BLOOD BY AUTOMATED COUNT: 14.3 % (ref 11.6–15.1)
GFR SERPL CREATININE-BSD FRML MDRD: 13 ML/MIN/1.73SQ M
GLUCOSE SERPL-MCNC: 85 MG/DL (ref 65–140)
HCT VFR BLD AUTO: 30.1 % (ref 34.8–46.1)
HGB BLD-MCNC: 9.6 G/DL (ref 11.5–15.4)
IMM GRANULOCYTES # BLD AUTO: 0.04 THOUSAND/UL (ref 0–0.2)
IMM GRANULOCYTES NFR BLD AUTO: 1 % (ref 0–2)
LYMPHOCYTES # BLD AUTO: 0.76 THOUSANDS/ΜL (ref 0.6–4.47)
LYMPHOCYTES NFR BLD AUTO: 12 % (ref 14–44)
MAGNESIUM SERPL-MCNC: 2.2 MG/DL (ref 1.6–2.6)
MCH RBC QN AUTO: 30.7 PG (ref 26.8–34.3)
MCHC RBC AUTO-ENTMCNC: 31.9 G/DL (ref 31.4–37.4)
MCV RBC AUTO: 96 FL (ref 82–98)
MONOCYTES # BLD AUTO: 0.47 THOUSAND/ΜL (ref 0.17–1.22)
MONOCYTES NFR BLD AUTO: 8 % (ref 4–12)
NEUTROPHILS # BLD AUTO: 4.83 THOUSANDS/ΜL (ref 1.85–7.62)
NEUTS SEG NFR BLD AUTO: 78 % (ref 43–75)
NRBC BLD AUTO-RTO: 0 /100 WBCS
PHOSPHATE SERPL-MCNC: 4.2 MG/DL (ref 2.3–4.1)
PLATELET # BLD AUTO: 358 THOUSANDS/UL (ref 149–390)
PMV BLD AUTO: 9.7 FL (ref 8.9–12.7)
POTASSIUM SERPL-SCNC: 4 MMOL/L (ref 3.5–5.3)
RBC # BLD AUTO: 3.13 MILLION/UL (ref 3.81–5.12)
SODIUM SERPL-SCNC: 142 MMOL/L (ref 136–145)
WBC # BLD AUTO: 6.15 THOUSAND/UL (ref 4.31–10.16)

## 2021-04-02 PROCEDURE — 85025 COMPLETE CBC W/AUTO DIFF WBC: CPT | Performed by: STUDENT IN AN ORGANIZED HEALTH CARE EDUCATION/TRAINING PROGRAM

## 2021-04-02 PROCEDURE — 84100 ASSAY OF PHOSPHORUS: CPT | Performed by: STUDENT IN AN ORGANIZED HEALTH CARE EDUCATION/TRAINING PROGRAM

## 2021-04-02 PROCEDURE — 83735 ASSAY OF MAGNESIUM: CPT | Performed by: STUDENT IN AN ORGANIZED HEALTH CARE EDUCATION/TRAINING PROGRAM

## 2021-04-02 PROCEDURE — C9113 INJ PANTOPRAZOLE SODIUM, VIA: HCPCS | Performed by: STUDENT IN AN ORGANIZED HEALTH CARE EDUCATION/TRAINING PROGRAM

## 2021-04-02 PROCEDURE — 80048 BASIC METABOLIC PNL TOTAL CA: CPT | Performed by: STUDENT IN AN ORGANIZED HEALTH CARE EDUCATION/TRAINING PROGRAM

## 2021-04-02 PROCEDURE — 74250 X-RAY XM SM INT 1CNTRST STD: CPT

## 2021-04-02 PROCEDURE — 99232 SBSQ HOSP IP/OBS MODERATE 35: CPT | Performed by: SURGERY

## 2021-04-02 RX ORDER — ATORVASTATIN CALCIUM 40 MG/1
40 TABLET, FILM COATED ORAL
Status: DISCONTINUED | OUTPATIENT
Start: 2021-04-02 | End: 2021-04-04 | Stop reason: HOSPADM

## 2021-04-02 RX ORDER — ASPIRIN 81 MG/1
81 TABLET ORAL DAILY
Status: DISCONTINUED | OUTPATIENT
Start: 2021-04-02 | End: 2021-04-04 | Stop reason: HOSPADM

## 2021-04-02 RX ORDER — DEXTROSE, SODIUM CHLORIDE, AND POTASSIUM CHLORIDE 5; .45; .15 G/100ML; G/100ML; G/100ML
125 INJECTION INTRAVENOUS CONTINUOUS
Status: DISCONTINUED | OUTPATIENT
Start: 2021-04-02 | End: 2021-04-03

## 2021-04-02 RX ORDER — CITALOPRAM 20 MG/1
20 TABLET ORAL DAILY
Status: DISCONTINUED | OUTPATIENT
Start: 2021-04-02 | End: 2021-04-04 | Stop reason: HOSPADM

## 2021-04-02 RX ORDER — PANTOPRAZOLE SODIUM 40 MG/1
40 INJECTION, POWDER, FOR SOLUTION INTRAVENOUS
Status: DISCONTINUED | OUTPATIENT
Start: 2021-04-02 | End: 2021-04-04 | Stop reason: HOSPADM

## 2021-04-02 RX ORDER — LEVOTHYROXINE SODIUM 0.07 MG/1
75 TABLET ORAL
Status: DISCONTINUED | OUTPATIENT
Start: 2021-04-02 | End: 2021-04-04 | Stop reason: HOSPADM

## 2021-04-02 RX ORDER — LANOLIN ALCOHOL/MO/W.PET/CERES
3 CREAM (GRAM) TOPICAL
Status: DISCONTINUED | OUTPATIENT
Start: 2021-04-02 | End: 2021-04-04 | Stop reason: HOSPADM

## 2021-04-02 RX ADMIN — ATORVASTATIN CALCIUM 40 MG: 40 TABLET, FILM COATED ORAL at 21:41

## 2021-04-02 RX ADMIN — ASPIRIN 81 MG: 81 TABLET, COATED ORAL at 15:48

## 2021-04-02 RX ADMIN — APIXABAN 2.5 MG: 2.5 TABLET, FILM COATED ORAL at 17:39

## 2021-04-02 RX ADMIN — DEXTROSE, SODIUM CHLORIDE, AND POTASSIUM CHLORIDE 125 ML/HR: 5; .45; .15 INJECTION INTRAVENOUS at 23:52

## 2021-04-02 RX ADMIN — METOROPROLOL TARTRATE 5 MG: 5 INJECTION, SOLUTION INTRAVENOUS at 02:53

## 2021-04-02 RX ADMIN — METOROPROLOL TARTRATE 5 MG: 5 INJECTION, SOLUTION INTRAVENOUS at 11:03

## 2021-04-02 RX ADMIN — METOPROLOL TARTRATE 25 MG: 25 TABLET, FILM COATED ORAL at 17:39

## 2021-04-02 RX ADMIN — MELATONIN TAB 3 MG 3 MG: 3 TAB at 21:41

## 2021-04-02 RX ADMIN — PANTOPRAZOLE SODIUM 40 MG: 40 INJECTION, POWDER, FOR SOLUTION INTRAVENOUS at 11:03

## 2021-04-02 RX ADMIN — CITALOPRAM HYDROBROMIDE 20 MG: 20 TABLET ORAL at 15:48

## 2021-04-02 RX ADMIN — SODIUM CHLORIDE, SODIUM LACTATE, POTASSIUM CHLORIDE, AND CALCIUM CHLORIDE 125 ML/HR: .6; .31; .03; .02 INJECTION, SOLUTION INTRAVENOUS at 01:38

## 2021-04-02 RX ADMIN — LEVOTHYROXINE SODIUM 75 MCG: 75 TABLET ORAL at 15:48

## 2021-04-02 RX ADMIN — DEXTROSE, SODIUM CHLORIDE, AND POTASSIUM CHLORIDE 125 ML/HR: 5; .45; .15 INJECTION INTRAVENOUS at 13:48

## 2021-04-02 RX ADMIN — DIATRIZOATE MEGLUMINE AND DIATRIZOATE SODIUM 120 ML: 660; 100 LIQUID ORAL; RECTAL at 12:00

## 2021-04-02 NOTE — QUICK NOTE
Nurse-Patient-Provider rounds were completed with the patient's nurse today, Kayce Baptiste  We discussed the plan is to pull NGT, start clear liquids, follow up SBFT, restart, continue to monitor for return of bowel function, Eliquis  We reviewed all of the invasive devices/lines/telemetry orders   - None  DVT Prophylaxis:  Eliquis    Pain Assessment / Plan:  - Continue current analgesic regimen  Mobility Assessment / Plan:  - Activity as tolerated  Goals / Barriers for discharge:  Errors to discharge include awaiting return of bowel function  NG tube removed this a m , advanced to clear liquid diet, follow-up small-bowel follow-through this evening  Case management following; case and discharge needs discussed  All questions and concerns were addressed  I spent greater than 14 minutes reviewing the plan with the patient and the nurse, and coordinating care for the day      Jb Galan PA-C  4/2/2021

## 2021-04-02 NOTE — PROGRESS NOTES
Pt was due to have PTT drawn again to see if heparingtt was therapeutic  PCA tried once unsuccessfully  Second PCA tried twice unsuccessfully  A third person tried twice and could not get the lab due to clotting or rolling of veins  Charge was notified and labs still could not be drawn  P4 was called and able to come down, but was unsuccessful in drawing PTT  Heparin had been on and off of running at this time due to attempts to draw PTT  Alta Walker was notified  Decision was made to stop the heparin drip until labs were able to be drawn  Will evaluate need for PICC in the morning  NG tube had some active bleeding which Dr Johnnie Mcduffie was made aware of  Told to keep pt on low suction and continue to monitor bleeding

## 2021-04-02 NOTE — CASE MANAGEMENT
LOS: 1  Not a bundle  Readmission risk: [place]  CM spoke with pt and completed CM assessment  Pt lives with her son who has autism (currently being cared for by family while pt is hospitalized) in a 2-story home with 6 steps at entrance  Pt is independent in ADLs  No DME or prior HHC  Preference for [facility] is [facility] [address]  No MH/D&A tx hx  PCP- Moriah Cm MD ([phone])  Pt reports her son Renetta Michele as [place] ([phone])  Pt reports her son or a neighbor will transport upon d/c     CM reviewed d/c planning process including the following: identifying help at home, patient preference for d/c planning needs, Discharge Lounge, [facility] Meds to Bed program, availability of treatment team to discuss questions or concerns patient and/or family may have regarding understanding medications and recognizing signs and symptoms once discharged  CM also encouraged patient to follow up with all recommended appointments after discharge  Patient advised of importance for patient and family to participate in managing patients medical well being

## 2021-04-02 NOTE — PROGRESS NOTES
Progress Note - General Surgery   Haresh De Paz 77 y o  female MRN: [id number]  Unit/Bed#: [facility] 303-01 Encounter: [id number]    Assessment:  77 y o  F with multiple previous abdominal surgeries including exploratory laparotomy, parastomal hernia repair, LALO 8/10/20, presents with recurrent SBO  Afebrile  VSS  NGT with 200 cc blood tinged bilious drainage     Plan:  NPO/NGT   Small bowel follow through today, consider discontinuing NGT pending results  [email or web address]  Holding heparin gtt at this time  Plan to transition back to Eliquis once tolerating po  OOB/Ambulate   PT/OT    Subjective/Objective     Subjective: Patient had a large bowel movement overnight and is now passing gas  She has had some nasal bleeding 2/2 NGT  Nursing was unable to get labs (PTT) for heparin gtt maintenance  Objective:     Blood pressure 150/90, pulse 68, temperature 98 1 °F (36 7 °C), temperature source Oral, resp  rate 18, height 5' 2" (1 575 m), weight 86 2 kg (190 lb 0 6 oz), SpO2 95 %, not currently breastfeeding  ,Body mass index is 34 76 kg/m²  Intake/Output Summary (Last 24 hours) at 4/2/2021 1972  Last data filed at 4/1/2021 1706  Gross per 24 hour   Intake 0 ml   Output 200 ml   Net -200 ml       Invasive Devices     Peripheral Intravenous Line            Peripheral IV 04/01/21 Left Arm less than 1 day          Drain            Urostomy Ileal conduit RUQ 1640 days    NG/OG/Enteral Tube Nasogastric 18 Fr Right nares less than 1 day                Physical Exam:   NAD, alert and oriented x3  Normocephalic, atraumatic  MMM, EOMI, PERRLA  NGT in place with blood tinged bilious drainage   Norm resp effort on RA  RRR  Abd soft, NT, mild upper abdominal distention  Ileal conduit in place with clear yellow urine in bag     No calf tenderness or peripheral edema  Motor/sensation intact in distal extremities  CN grossly intact  -rash/lesions      Lab, Imaging and other studies:  CBC:   Lab Results   Component Value Date    WBC 7 03 04/01/2021    HGB 9 9 (L) 04/01/2021    HCT 31 4 (L) 04/01/2021    MCV 97 04/01/2021     04/01/2021    MCH 30 7 04/01/2021    MCHC 31 5 04/01/2021    RDW 14 2 04/01/2021    MPV 9 8 04/01/2021    NRBC 0 04/01/2021   , CMP:   Lab Results   Component Value Date    SODIUM 139 04/01/2021    K 4 3 04/01/2021     (H) 04/01/2021    CO2 22 04/01/2021    BUN 49 (H) 04/01/2021    CREATININE 3 72 (H) 04/01/2021    CALCIUM 9 2 04/01/2021    AST 24 04/01/2021    ALT 17 04/01/2021    ALKPHOS 77 04/01/2021    EGFR 11 04/01/2021     VTE Pharmacologic Prophylaxis: Heparin  VTE Mechanical Prophylaxis: sequential compression device

## 2021-04-02 NOTE — UTILIZATION REVIEW
Initial Clinical Review    Admission: Date/Time/Statement:   Admission Orders (From admission, onward)     Ordered        04/01/21 1521  Inpatient Admission  Once                   Orders Placed This Encounter   Procedures    Inpatient Admission     Standing Status:   Standing     Number of Occurrences:   1     Order Specific Question:   Level of Care     Answer:   Med Surg [16]     Order Specific Question:   Estimated length of stay     Answer:   More than 2 Midnights     Order Specific Question:   Certification     Answer:   I certify that inpatient services are medically necessary for this patient for a duration of greater than two midnights  See H&P and MD Progress Notes for additional information about the patient's course of treatment  ED Arrival Information     Expected Arrival Acuity Means of Arrival Escorted By Service Admission Type    - 4/1/2021 11:23 Urgent Walk-In Self Surgery-General Urgent    Arrival Complaint    Constipation/Vomiting        Chief Complaint   Patient presents with    Abdominal Pain     Pt c/o epigastric pain, vomiting, constipation since Tuesday  Hx bowel obstruction 6 months ago  Assessment/Plan: 78 y/o female with PMhx of DVT/PE on Eliquis, polycythemia vera, HTN, HLD, CKD, multiple prior abdominal surgeries including cystectomy with ileal conduit 2016, cholecystectomy, tubal ligation, also with hx of recurrent SBOs s/p ex lap, parastomal hernia repair with mesh, LALO 8/10/2020 who presents to ED from home with a few days of abdominal pain, decreased appetite, constipation and obstipation  She is unable to tolerate solid foods over the last few days, but has been able to keep down fluids  Abdominal pain is intermittent, located in the epigastric region  Last BM Monday, but intermittently passing gas  In ED CTAP showed a high grade SBO  Surgery consulted  Admit inpatient under surgery service with SBO -- NPO, NGT to low suction  IVF's   Hold Eliquis, start Heparin gtt given recent hx of DVT  Monitor abd exams  SCD's     4/2 -- Patient had a large BM overnight and is now passing gas  She has had some nasal bleeding 2/2 NGT  Nursing was unable to get labs (PTT) for heparin gtt maintenance  NGT had 200 ml blood tinged bilious drainage  Continue Npo/NGT  Continue IVF's  Holding hep gtt at this time  Plan to transition back to Eliquis once tolerating po  OOB/ambulate  PT/OT  I/O q4h  Up as seble  SCD's       ED Triage Vitals [04/01/21 1220]   Temperature Pulse Respirations Blood Pressure SpO2   98 1 °F (36 7 °C) 81 18 146/82 97 %      Temp Source Heart Rate Source Patient Position - Orthostatic VS BP Location FiO2 (%)   Oral Monitor Sitting Right arm --      Pain Score       No Pain          Wt Readings from Last 1 Encounters:   04/01/21 86 2 kg (190 lb 0 6 oz)     Additional Vital Signs:   Date/Time  Temp  Pulse  Resp  BP  MAP (mmHg)  SpO2  O2 Device  Patient Position - Orthostatic VS   04/02/21 0700  98 2 °F (36 8 °C)  78  18  136/80  126  99 %  None (Room air)  Lying   04/02/21 0253  --  --  --  150/90  --  --  --  Lying   04/01/21 2100  --  --  --  184/113Abnormal   136  95 %  --  Lying   04/01/21 1650  --  68  18  175/83Abnormal   119  96 %  None (Room air)  --   04/01/21 1220  98 1 °F (36 7 °C)  81  18  146/82  --  97 %  None (Room air)  Sitting       Pertinent Labs/Diagnostic Test Results:   CT a/p 4/1 -- High-grade versus complete distal small bowel obstruction with a transition point in the left lower quadrant  Left renal atrophy  Bilateral nonobstructing renal calculi  Status post cystectomy  Cystic structure in the right lower quadrant consistent with a fluid-filled ileal conduit, ovarian/paraovarian cyst, or dilated fallopian tube which has increased in size since July 14, 2015           Results from last 7 days   Lab Units 04/01/21  1256   WBC Thousand/uL 7 03   HEMOGLOBIN g/dL 9 9*   HEMATOCRIT % 31 4*   PLATELETS Thousands/uL 364   NEUTROS ABS Thousands/µL 5 66 Results from last 7 days   Lab Units 04/01/21  1256   SODIUM mmol/L 139   POTASSIUM mmol/L 4 3   CHLORIDE mmol/L 110*   CO2 mmol/L 22   ANION GAP mmol/L 7   BUN mg/dL 49*   CREATININE mg/dL 3 72*   EGFR ml/min/1 73sq m 11   CALCIUM mg/dL 9 2     Results from last 7 days   Lab Units 04/01/21  1256   AST U/L 24   ALT U/L 17   ALK PHOS U/L 77   TOTAL PROTEIN g/dL 7 1   ALBUMIN g/dL 3 7   TOTAL BILIRUBIN mg/dL 0 71     Results from last 7 days   Lab Units 04/01/21  1256   GLUCOSE RANDOM mg/dL 102     Results from last 7 days   Lab Units 04/01/21  1612   PROTIME seconds 17 9*   INR  1 48*   PTT seconds 34     Results from last 7 days   Lab Units 04/01/21  1256   LACTIC ACID mmol/L 1 5     ED Treatment:   Medication Administration from 04/01/2021 1123 to 04/01/2021 1937       Date/Time Order Dose Route Action     04/01/2021 1650 lactated ringers infusion 125 mL/hr Intravenous New Bag     04/01/2021 1650 ondansetron (ZOFRAN) injection 4 mg 4 mg Intravenous Given     04/01/2021 1650 metoprolol (LOPRESSOR) injection 5 mg 5 mg Intravenous Given     04/01/2021 1650 heparin (porcine) 25,000 units in 0 45% NaCl 250 mL infusion (premix) 18 Units/kg/hr Intravenous New Bag     04/01/2021 1247 heparin (porcine) 25,000 units in 0 45% NaCl 250 mL infusion (premix) 0 Units/kg/hr Intravenous Stopped     04/01/2021 1230 heparin (porcine) 25,000 units in 0 45% NaCl 250 mL infusion (premix) 0 Units/kg/hr Intravenous Stopped     Past Medical History:   Diagnosis Date    Anxiety     Bowel obstruction (HCC)     Chronic kidney disease (CKD), stage IV (severe) (HCC)     stage IV    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    Circulation problem     Compression fracture of cervical spine (HCC)     Hernia of abdominal cavity     History of kidney problems     Hydronephrosis     Hypertension     IBS (irritable bowel syndrome)     Incontinence     Lung mass     Improving on PET/CT 1/2016    Polycythemia vera ([address] )  Pulmonary embolism ([facility] ) 2014    Shingles     Urinary tract infection      Present on Admission:   Small bowel obstruction (HCC)      Admitting Diagnosis: Small bowel obstruction ([facility] ) [K56 609]  Abdominal pain [R10 9]  Age/Sex: 77 y o  female  Admission Orders:  Scheduled Medications:  metoprolol, 5 mg, Intravenous, Q6H  pantoprazole, 40 mg, Intravenous, Q24H [facility]    Continuous IV Infusions:  dextrose 5 % and sodium chloride 0 45 % with KCl 20 mEq/L, 125 mL/hr, Intravenous, Continuous  heparin (porcine), 3-30 Units/kg/hr (Order-Specific), Intravenous, Titrated      PRN Meds:  ondansetron, 4 mg, Intravenous, Q6H PRN 4/1 x1        Network Utilization Review Department  ATTENTION: Please call with any questions or concerns to [phone] and carefully listen to the prompts so that you are directed to the right person  All voicemails are confidential   [facility] all requests for admission clinical reviews, approved or denied determinations and any other requests to dedicated fax number below belonging to the campus where the patient is receiving treatment   List of dedicated fax numbers for the Facilities:  [address] DENIALS (Administrative/Medical Necessity) [phone]   [address] (Maternity/NICU/Pediatrics) [phone]   [address] Neto Frey 2078 (Mickey Aguilar) [address] [place] [address] [address] Loera [address] [place] [address] [phone]

## 2021-04-02 NOTE — UTILIZATION REVIEW
Notification of Inpatient Admission/Inpatient Authorization Request   This is a Notification of Inpatient Admission for [address] Akiko Bloomace  Be advised that this patient was admitted to our facility under Inpatient Status  Contact Zheng Starr at [phone] for additional admission information  [facility] DEPT  DEDICATED -[phone]  Patient Name:   Marciano Saenz   YOB: 1945       [address]   [address]:   Cecile Soto  Tax ID: [id number]  NPI: [id number] Attending Provider/NPI:  Phone:  Address: Arcelia Hughes [[id number]]  [phone]  Same as TRINIDAD/Malika Kirkpatrick 1106 of Service Code: 24 Place of Service Name:  [address] [place] [address]   Start Date: 4/1/21 1519 Discharge Date & Time: No discharge date for patient encounter  Type of Admission: Inpatient Status Discharge Disposition (if discharged): Home/Self Care   Patient Diagnoses: Small bowel obstruction ([address] ) [K56 609]  Abdominal pain [R10 9]     Orders: Admission Orders (From admission, onward)     Ordered        04/01/21 1521  Inpatient Admission  Once                    Assigned Utilization Review Contact: Zheng Starr  Utilization ,   Network Utilization Review Department  Phone: [phone]; Fax [phone]   Email: [email or web address]   ATTENTION PAYERS: Please call the assigned Utilization  directly with any questions or concerns ALL voicemails in the department are confidential  Send all requests for admission clinical reviews, approved or denied determinations and any other requests to dedicated fax number belonging to the campus where the patient is receiving treatment

## 2021-04-02 NOTE — PLAN OF CARE
Problem: Potential for Falls  Goal: Patient will remain free of falls  Description: INTERVENTIONS:  - Assess patient frequently for physical needs  -  Identify cognitive and physical deficits and behaviors that affect risk of falls    -  [place] fall precautions as indicated by assessment   - Educate patient/family on patient safety including physical limitations  - Instruct patient to call for assistance with activity based on assessment  - Modify environment to reduce risk of injury  - Consider OT/PT consult to assist with strengthening/mobility  Outcome: Progressing     Problem: Prexisting or High Potential for Compromised Skin Integrity  Goal: Skin integrity is maintained or improved  Description: INTERVENTIONS:  - Identify patients at risk for skin breakdown  - Assess and monitor skin integrity  - Assess and monitor nutrition and hydration status  - Monitor labs   - Assess for incontinence   - Turn and reposition patient  - Assist with mobility/ambulation  - Relieve pressure over bony prominences  - Avoid friction and shearing  - Provide appropriate hygiene as needed including keeping skin clean and dry  - Evaluate need for skin moisturizer/barrier cream  - Collaborate with interdisciplinary team   - Patient/family teaching  - Consider wound care consult   Outcome: Progressing

## 2021-04-03 LAB
ANION GAP SERPL CALCULATED.3IONS-SCNC: 7 MMOL/L (ref 4–13)
BUN SERPL-MCNC: 44 MG/DL (ref 5–25)
CALCIUM SERPL-MCNC: 8.4 MG/DL (ref 8.3–10.1)
CHLORIDE SERPL-SCNC: 116 MMOL/L (ref 100–108)
CO2 SERPL-SCNC: 20 MMOL/L (ref 21–32)
CREAT SERPL-MCNC: 3.39 MG/DL (ref 0.6–1.3)
GFR SERPL CREATININE-BSD FRML MDRD: 13 ML/MIN/1.73SQ M
GLUCOSE SERPL-MCNC: 98 MG/DL (ref 65–140)
POTASSIUM SERPL-SCNC: 4 MMOL/L (ref 3.5–5.3)
SODIUM SERPL-SCNC: 143 MMOL/L (ref 136–145)

## 2021-04-03 PROCEDURE — C9113 INJ PANTOPRAZOLE SODIUM, VIA: HCPCS | Performed by: STUDENT IN AN ORGANIZED HEALTH CARE EDUCATION/TRAINING PROGRAM

## 2021-04-03 PROCEDURE — 80048 BASIC METABOLIC PNL TOTAL CA: CPT | Performed by: PHYSICIAN ASSISTANT

## 2021-04-03 PROCEDURE — 99232 SBSQ HOSP IP/OBS MODERATE 35: CPT | Performed by: SURGERY

## 2021-04-03 RX ADMIN — CITALOPRAM HYDROBROMIDE 20 MG: 20 TABLET ORAL at 08:52

## 2021-04-03 RX ADMIN — METOPROLOL TARTRATE 25 MG: 25 TABLET, FILM COATED ORAL at 08:51

## 2021-04-03 RX ADMIN — ASPIRIN 81 MG: 81 TABLET, COATED ORAL at 08:51

## 2021-04-03 RX ADMIN — ATORVASTATIN CALCIUM 40 MG: 40 TABLET, FILM COATED ORAL at 20:48

## 2021-04-03 RX ADMIN — DEXTROSE, SODIUM CHLORIDE, AND POTASSIUM CHLORIDE 125 ML/HR: 5; .45; .15 INJECTION INTRAVENOUS at 11:20

## 2021-04-03 RX ADMIN — APIXABAN 2.5 MG: 2.5 TABLET, FILM COATED ORAL at 16:53

## 2021-04-03 RX ADMIN — METOPROLOL TARTRATE 25 MG: 25 TABLET, FILM COATED ORAL at 16:53

## 2021-04-03 RX ADMIN — APIXABAN 2.5 MG: 2.5 TABLET, FILM COATED ORAL at 08:51

## 2021-04-03 RX ADMIN — MELATONIN TAB 3 MG 3 MG: 3 TAB at 20:48

## 2021-04-03 RX ADMIN — PANTOPRAZOLE SODIUM 40 MG: 40 INJECTION, POWDER, FOR SOLUTION INTRAVENOUS at 08:51

## 2021-04-03 RX ADMIN — LEVOTHYROXINE SODIUM 75 MCG: 75 TABLET ORAL at 05:12

## 2021-04-03 NOTE — DISCHARGE INSTRUCTIONS
Bowel Obstruction   WHAT YOU NEED TO KNOW:   A bowel obstruction occurs when your large or small intestine is completely or partly blocked  The blockage prevents food and waste from passing through normally  DISCHARGE INSTRUCTIONS:   Follow up with your healthcare provider as directed:  Write down your questions so you remember to ask them during your visits  Contact your healthcare provider if:   · You have nausea and are vomiting  · Your abdomen is enlarged  · You cannot pass a bowel movement or gas  · You lose weight without trying  · You have blood in your bowel movement  · You have questions or concerns about your condition or care  Seek care immediately or call 911 if:   · You have severe abdominal pain that does not get better  · Your heart is beating faster than normal for you  · You have a fever  © Copyright [employer] Information is for End User's use only and may not be sold, redistributed or otherwise used for commercial purposes  All illustrations and images included in CareNotes® are the copyrighted property of A D A M , Inc  or [address] [place], [place] [address]pa[facility]  The above information is an  only  It is not intended as medical advice for individual conditions or treatments  Talk to your doctor, nurse or pharmacist before following any medical regimen to see if it is safe and effective for you

## 2021-04-03 NOTE — PROGRESS NOTES
Progress Note - General Surgery   Shyann Camacho 77 y o  female MRN: [id number]  Unit/Bed#: [facility] 323-01 Encounter: [id number]    Assessment:  77 y o  F with multiple previous abdominal surgeries including exploratory laparotomy, parastomal hernia repair, LALO 8/10/20, presents with recurrent SBO  Plan:  Advance to surgical soft  Discontinue IVF  Continue eliquis  OOB/Ambulate   PT/OT  Possible dc home today if tolerating diet    Subjective/Objective     Subjective:  No acute events overnight  Patient passing flatus and having bowel movements  Denies abdominal pain  Objective:     Blood pressure 159/76, pulse 67, temperature 98 2 °F (36 8 °C), temperature source Oral, resp  rate 13, height 5' 2" (1 575 m), weight 86 2 kg (190 lb 0 6 oz), SpO2 94 %, not currently breastfeeding  ,Body mass index is 34 76 kg/m²  Intake/Output Summary (Last 24 hours) at 4/3/2021 0817  Last data filed at 4/3/2021 0600  Gross per 24 hour   Intake 2420 ml   Output 500 ml   Net 1920 ml       Invasive Devices     Peripheral Intravenous Line            Peripheral IV 04/03/21 Left Forearm less than 1 day          Drain            Urostomy Ileal conduit RUQ 1641 days                Physical Exam:   GEN: NAD  HEENT: MMM  CV:   Warm/well perfused  Lung: normal effort  Ab: Soft, NT/ND  Ileal conduit with light yellow urine in bag  Extrem: No CCE  Neuro:  A+Ox3, motor and sensation grossly intact      Lab, Imaging and other studies:  CBC:   Lab Results   Component Value Date    WBC 6 15 04/02/2021    HGB 9 6 (L) 04/02/2021    HCT 30 1 (L) 04/02/2021    MCV 96 04/02/2021     04/02/2021    MCH 30 7 04/02/2021    MCHC 31 9 04/02/2021    RDW 14 3 04/02/2021    MPV 9 7 04/02/2021    NRBC 0 04/02/2021   , CMP:   Lab Results   Component Value Date    SODIUM 143 04/03/2021    K 4 0 04/03/2021     (H) 04/03/2021    CO2 20 (L) 04/03/2021    BUN 44 (H) 04/03/2021    CREATININE 3 39 (H) 04/03/2021    CALCIUM 8 4 04/03/2021    EGFR 13 04/03/2021     VTE Pharmacologic Prophylaxis: Heparin  VTE Mechanical Prophylaxis: sequential compression device

## 2021-04-04 VITALS
RESPIRATION RATE: 16 BRPM | WEIGHT: 190.04 LBS | DIASTOLIC BLOOD PRESSURE: 78 MMHG | BODY MASS INDEX: 34.97 KG/M2 | HEIGHT: 62 IN | HEART RATE: 78 BPM | OXYGEN SATURATION: 97 % | TEMPERATURE: 98.3 F | SYSTOLIC BLOOD PRESSURE: 147 MMHG

## 2021-04-04 PROCEDURE — NC001 PR NO CHARGE: Performed by: SURGERY

## 2021-04-04 PROCEDURE — 99232 SBSQ HOSP IP/OBS MODERATE 35: CPT | Performed by: SURGERY

## 2021-04-04 PROCEDURE — C9113 INJ PANTOPRAZOLE SODIUM, VIA: HCPCS | Performed by: STUDENT IN AN ORGANIZED HEALTH CARE EDUCATION/TRAINING PROGRAM

## 2021-04-04 RX ADMIN — LEVOTHYROXINE SODIUM 75 MCG: 75 TABLET ORAL at 05:19

## 2021-04-04 RX ADMIN — PANTOPRAZOLE SODIUM 40 MG: 40 INJECTION, POWDER, FOR SOLUTION INTRAVENOUS at 09:22

## 2021-04-04 RX ADMIN — ASPIRIN 81 MG: 81 TABLET, COATED ORAL at 09:21

## 2021-04-04 RX ADMIN — METOPROLOL TARTRATE 25 MG: 25 TABLET, FILM COATED ORAL at 09:21

## 2021-04-04 RX ADMIN — CITALOPRAM HYDROBROMIDE 20 MG: 20 TABLET ORAL at 09:23

## 2021-04-04 RX ADMIN — APIXABAN 2.5 MG: 2.5 TABLET, FILM COATED ORAL at 09:22

## 2021-04-04 NOTE — PROGRESS NOTES
Progress Note - General Surgery   Malick  77 y o  female MRN: [id number]  Unit/Bed#: [facility] 323-01 Encounter: [id number]    Assessment:  77 y o  female with SBO, recovering well with conservative management, NGT out      Plan:  Surg soft diet  Likely discharge today if tolerating PO  On Eliquis    Subjective/Objective     Subjective: No complaints, states that she is tolerating oral intake without nausea or emesis  Endorses flatus, no BM since 4/2 when she received PO contrast      Objective:     Vitals: Temp:  [98 °F (36 7 °C)-98 2 °F (36 8 °C)] 98 °F (36 7 °C)  HR:  [67-82] 73  Resp:  [13-16] 16  BP: (142-165)/(76-84) 165/77  Body mass index is 34 76 kg/m²  I/O       04/02 0701 - 04/03 0700 04/03 0701 - 04/04 0700    P  O  420 420    I V  (mL/kg) 2000 (23 2)     Total Intake(mL/kg) 2420 (28 1) 420 (4 9)    Urine (mL/kg/hr) 500 (0 2) 225 (0 1)    Stool 0     Total Output 500 225    Net +1920 +195          Unmeasured Urine Occurrence 4 x     Unmeasured Stool Occurrence 7 x           Physical Exam:  GEN: NAD  HEENT: MMM  CV: RRR  Lung: Normal effort  Ab: Soft, ND/NT   Extrem: No CCE   Neuro: A+Ox3     Lab, Imaging and other studies: I have personally reviewed pertinent reports    , CBC with diff: No results found for: WBC, HGB, HCT, MCV, PLT, ADJUSTEDWBC, MCH, MCHC, RDW, MPV, NRBC, BMP/CMP:   Lab Results   Component Value Date    SODIUM 143 04/03/2021    K 4 0 04/03/2021     (H) 04/03/2021    CO2 20 (L) 04/03/2021    BUN 44 (H) 04/03/2021    CREATININE 3 39 (H) 04/03/2021    CALCIUM 8 4 04/03/2021    EGFR 13 04/03/2021     VTE Pharmacologic Prophylaxis: Eliquis  VTE Mechanical Prophylaxis: sequential compression device        Randy Lehman MD  4/4/2021 4:20 AM

## 2021-04-04 NOTE — PLAN OF CARE
Problem: Potential for Falls  Goal: Patient will remain free of falls  Description: INTERVENTIONS:  - Assess patient frequently for physical needs  -  Identify cognitive and physical deficits and behaviors that affect risk of falls    -  [place] fall precautions as indicated by assessment   - Educate patient/family on patient safety including physical limitations  - Instruct patient to call for assistance with activity based on assessment  - Modify environment to reduce risk of injury  - Consider OT/PT consult to assist with strengthening/mobility  Outcome: Progressing     Problem: Prexisting or High Potential for Compromised Skin Integrity  Goal: Skin integrity is maintained or improved  Description: INTERVENTIONS:  - Identify patients at risk for skin breakdown  - Assess and monitor skin integrity  - Assess and monitor nutrition and hydration status  - Monitor labs   - Assess for incontinence   - Turn and reposition patient  - Assist with mobility/ambulation  - Relieve pressure over bony prominences  - Avoid friction and shearing  - Provide appropriate hygiene as needed including keeping skin clean and dry  - Evaluate need for skin moisturizer/barrier cream  - Collaborate with interdisciplinary team   - Patient/family teaching  - Consider wound care consult   Outcome: Progressing

## 2021-04-04 NOTE — PLAN OF CARE
Problem: Potential for Falls  Goal: Patient will remain free of falls  Description: INTERVENTIONS:  - Assess patient frequently for physical needs  -  Identify cognitive and physical deficits and behaviors that affect risk of falls    -  [place] fall precautions as indicated by assessment   - Educate patient/family on patient safety including physical limitations  - Instruct patient to call for assistance with activity based on assessment  - Modify environment to reduce risk of injury  - Consider OT/PT consult to assist with strengthening/mobility  4/4/2021 1240 by Chelsy Poole RN  Outcome: Adequate for Discharge  4/4/2021 0738 by Chelsy Poole RN  Outcome: Progressing     Problem: Prexisting or High Potential for Compromised Skin Integrity  Goal: Skin integrity is maintained or improved  Description: INTERVENTIONS:  - Identify patients at risk for skin breakdown  - Assess and monitor skin integrity  - Assess and monitor nutrition and hydration status  - Monitor labs   - Assess for incontinence   - Turn and reposition patient  - Assist with mobility/ambulation  - Relieve pressure over bony prominences  - Avoid friction and shearing  - Provide appropriate hygiene as needed including keeping skin clean and dry  - Evaluate need for skin moisturizer/barrier cream  - Collaborate with interdisciplinary team   - Patient/family teaching  - Consider wound care consult   4/4/2021 1240 by Chelsy Poole RN  Outcome: Adequate for Discharge  4/4/2021 0738 by Chelsy Poole RN  Outcome: Progressing

## 2021-04-05 NOTE — UTILIZATION REVIEW
Notification of Discharge  This is a Notification of Discharge from our facility [address]  Please be advised that this patient has been discharge from our facility  Below you will find the admission and discharge date and time including the patients disposition  PRESENTATION DATE: 4/1/2021 12:22 PM  OBS ADMISSION DATE:   IP ADMISSION DATE: 4/1/21 1519   DISCHARGE DATE: 4/4/2021  1:21 PM  DISPOSITION: Home/Self Care Home/Self Care   Admission Orders listed below:  Admission Orders (From admission, onward)     Ordered        04/01/21 1521  Inpatient Admission  Once                   Please contact the UR Department if additional information is required to close this patient's authorization/case  [address] Utilization Review Department  Main: [phone] x carefully listen to the prompts  All voicemails are confidential   [email or web address]  Send all requests for admission clinical reviews, approved or denied determinations and any other requests to dedicated fax number below belonging to the campus where the patient is receiving treatment   List of dedicated fax numbers:  [address] DENIALS (Administrative/Medical Necessity) [phone]   [address] (Maternity/NICU/Pediatrics) [phone]   Pedro Pack [phone]   Bernadine Awad [phone]   Roberto Brasher [phone]   Henrirol Balloon [facility] [address] [facility] [phone]   [facility]  [phone]   [address] [facility] [address]  [address] [address] [address] [facility] [phone]

## 2021-04-07 NOTE — DISCHARGE SUMMARY
Discharge Summary - General Surgery   Mauricio Beach 77 y o  female MRN: [id number]  Unit/Bed#: [facility] 323-01 Encounter: [id number]    Admission Date: 4/1/2021     Discharge Date: 4/4/21    Admitting Diagnosis: Small bowel obstruction ([address] ) [K56 609]  Abdominal pain [R10 9]    Discharge Diagnosis: Same    Attending:  Dr Mir Reynolds Physician(s): None    Procedures Performed:   None    Pathology: None    Hospital Course:  Mauricio Beach is a 77 y o  female   With multiple previous abdominal surgeries and history of DVT/ PE on Eliquis  Patient was admitted to the surgical service for management of her small bowel obstruction  This was successfully managed with non operative therapy consisting of NPO/ NG tube and IV fluids for bowel rest   Her NG tube was removed on hospital day 2, and small-bowel follow-through at that time demonstrated evidence of contrast in the  Rectum at 6:00 a m  post contrast administration  However there was somewhat of a contradictory read of persistent high-grade small bowel obstruction at the time of the study  The patient remained hospitalized through 4/4 as her diet was gently advanced and her abdominal exam was monitored  At the time of discharge she was having complete return of bowel function and her pain completely resolved  Condition at Discharge: good     Discharge instructions/Information to patient and family:   See after visit summary for information provided to patient and family  Provisions for Follow-Up Care:  See after visit summary for information related to follow-up care and any pertinent home health orders  Disposition: Home    Planned Readmission: No    Discharge Statement   I spent 30 minutes discharging the patient  This time was spent on the day of discharge  I had direct contact with the patient on the day of discharge  Additional documentation is required if more than 30 minutes were spent on discharge       Discharge Medications:  See after visit summary for reconciled discharge medications provided to patient and family

## 2021-04-14 ENCOUNTER — TELEPHONE (OUTPATIENT)
Dept: NEPHROLOGY | Facility: CLINIC | Age: 76
End: 2021-04-14

## 2021-04-14 DIAGNOSIS — N18.5 STAGE 5 CHRONIC KIDNEY DISEASE NOT ON CHRONIC DIALYSIS (HCC): Primary | ICD-10-CM

## 2021-04-14 NOTE — TELEPHONE ENCOUNTER
----- Message from Rebecca Atkinson MD sent at 4/13/2021  2:13 PM EDT -----  Please check a CBC, renal function panel and PTH before next appointment  Thanks,  ----- Message -----  From: Alysha Vaughn MA  Sent: 4/12/2021   3:25 PM EDT  To: Rebecca Atkinson MD    Hey, did you want her to repeat labs prior to seeing you on 4/19? Last BMP was done on 4/3 day of d/c

## 2021-04-15 ENCOUNTER — LAB (OUTPATIENT)
Dept: LAB | Facility: CLINIC | Age: 76
End: 2021-04-15
Payer: COMMERCIAL

## 2021-04-15 DIAGNOSIS — N18.5 STAGE 5 CHRONIC KIDNEY DISEASE NOT ON CHRONIC DIALYSIS (HCC): ICD-10-CM

## 2021-04-15 DIAGNOSIS — D45 POLYCYTHEMIA VERA (HCC): ICD-10-CM

## 2021-04-15 DIAGNOSIS — N18.5 ANEMIA IN STAGE 5 CHRONIC KIDNEY DISEASE, NOT ON CHRONIC DIALYSIS (HCC): ICD-10-CM

## 2021-04-15 DIAGNOSIS — D63.1 ANEMIA IN STAGE 5 CHRONIC KIDNEY DISEASE, NOT ON CHRONIC DIALYSIS (HCC): ICD-10-CM

## 2021-04-15 LAB
ALBUMIN SERPL BCP-MCNC: 3.2 G/DL (ref 3.5–5)
ALP SERPL-CCNC: 78 U/L (ref 46–116)
ALT SERPL W P-5'-P-CCNC: 14 U/L (ref 12–78)
ANION GAP SERPL CALCULATED.3IONS-SCNC: 9 MMOL/L (ref 4–13)
AST SERPL W P-5'-P-CCNC: 19 U/L (ref 5–45)
BASOPHILS # BLD AUTO: 0.06 THOUSANDS/ΜL (ref 0–0.1)
BASOPHILS NFR BLD AUTO: 1 % (ref 0–1)
BILIRUB SERPL-MCNC: 0.57 MG/DL (ref 0.2–1)
BUN SERPL-MCNC: 50 MG/DL (ref 5–25)
CALCIUM ALBUM COR SERPL-MCNC: 9 MG/DL (ref 8.3–10.1)
CALCIUM SERPL-MCNC: 8.4 MG/DL (ref 8.3–10.1)
CHLORIDE SERPL-SCNC: 112 MMOL/L (ref 100–108)
CO2 SERPL-SCNC: 17 MMOL/L (ref 21–32)
CREAT SERPL-MCNC: 3.41 MG/DL (ref 0.6–1.3)
EOSINOPHIL # BLD AUTO: 0.08 THOUSAND/ΜL (ref 0–0.61)
EOSINOPHIL NFR BLD AUTO: 1 % (ref 0–6)
ERYTHROCYTE [DISTWIDTH] IN BLOOD BY AUTOMATED COUNT: 14.5 % (ref 11.6–15.1)
GFR SERPL CREATININE-BSD FRML MDRD: 13 ML/MIN/1.73SQ M
GLUCOSE P FAST SERPL-MCNC: 87 MG/DL (ref 65–99)
HCT VFR BLD AUTO: 29 % (ref 34.8–46.1)
HGB BLD-MCNC: 9.1 G/DL (ref 11.5–15.4)
IMM GRANULOCYTES # BLD AUTO: 0.06 THOUSAND/UL (ref 0–0.2)
IMM GRANULOCYTES NFR BLD AUTO: 1 % (ref 0–2)
LYMPHOCYTES # BLD AUTO: 1.16 THOUSANDS/ΜL (ref 0.6–4.47)
LYMPHOCYTES NFR BLD AUTO: 20 % (ref 14–44)
MCH RBC QN AUTO: 30.6 PG (ref 26.8–34.3)
MCHC RBC AUTO-ENTMCNC: 31.4 G/DL (ref 31.4–37.4)
MCV RBC AUTO: 98 FL (ref 82–98)
MONOCYTES # BLD AUTO: 0.34 THOUSAND/ΜL (ref 0.17–1.22)
MONOCYTES NFR BLD AUTO: 6 % (ref 4–12)
NEUTROPHILS # BLD AUTO: 4.17 THOUSANDS/ΜL (ref 1.85–7.62)
NEUTS SEG NFR BLD AUTO: 71 % (ref 43–75)
NRBC BLD AUTO-RTO: 0 /100 WBCS
PHOSPHATE SERPL-MCNC: 4 MG/DL (ref 2.3–4.1)
PLATELET # BLD AUTO: 354 THOUSANDS/UL (ref 149–390)
PMV BLD AUTO: 10.1 FL (ref 8.9–12.7)
POTASSIUM SERPL-SCNC: 4.1 MMOL/L (ref 3.5–5.3)
PROT SERPL-MCNC: 6.5 G/DL (ref 6.4–8.2)
PTH-INTACT SERPL-MCNC: 242.9 PG/ML (ref 18.4–80.1)
RBC # BLD AUTO: 2.97 MILLION/UL (ref 3.81–5.12)
SODIUM SERPL-SCNC: 138 MMOL/L (ref 136–145)
WBC # BLD AUTO: 5.87 THOUSAND/UL (ref 4.31–10.16)

## 2021-04-15 PROCEDURE — 80053 COMPREHEN METABOLIC PANEL: CPT

## 2021-04-15 PROCEDURE — 84100 ASSAY OF PHOSPHORUS: CPT

## 2021-04-15 PROCEDURE — 36415 COLL VENOUS BLD VENIPUNCTURE: CPT

## 2021-04-15 PROCEDURE — 83970 ASSAY OF PARATHORMONE: CPT

## 2021-04-15 PROCEDURE — 85025 COMPLETE CBC W/AUTO DIFF WBC: CPT

## 2021-04-19 ENCOUNTER — OFFICE VISIT (OUTPATIENT)
Dept: NEPHROLOGY | Facility: CLINIC | Age: 76
End: 2021-04-19
Payer: COMMERCIAL

## 2021-04-19 VITALS
WEIGHT: 194 LBS | BODY MASS INDEX: 35.7 KG/M2 | DIASTOLIC BLOOD PRESSURE: 70 MMHG | HEIGHT: 62 IN | HEART RATE: 61 BPM | SYSTOLIC BLOOD PRESSURE: 140 MMHG

## 2021-04-19 DIAGNOSIS — N18.5 STAGE 5 CHRONIC KIDNEY DISEASE NOT ON CHRONIC DIALYSIS (HCC): Primary | ICD-10-CM

## 2021-04-19 DIAGNOSIS — D75.1 POLYCYTHEMIA: ICD-10-CM

## 2021-04-19 DIAGNOSIS — K56.609 SMALL BOWEL OBSTRUCTION (HCC): ICD-10-CM

## 2021-04-19 DIAGNOSIS — N18.5 ANEMIA IN STAGE 5 CHRONIC KIDNEY DISEASE, NOT ON CHRONIC DIALYSIS (HCC): ICD-10-CM

## 2021-04-19 DIAGNOSIS — D63.1 ANEMIA IN STAGE 5 CHRONIC KIDNEY DISEASE, NOT ON CHRONIC DIALYSIS (HCC): ICD-10-CM

## 2021-04-19 DIAGNOSIS — I10 ESSENTIAL HYPERTENSION: ICD-10-CM

## 2021-04-19 DIAGNOSIS — E78.5 HYPERLIPIDEMIA, UNSPECIFIED HYPERLIPIDEMIA TYPE: ICD-10-CM

## 2021-04-19 DIAGNOSIS — N25.81 SECONDARY HYPERPARATHYROIDISM OF RENAL ORIGIN (HCC): ICD-10-CM

## 2021-04-19 PROCEDURE — 99214 OFFICE O/P EST MOD 30 MIN: CPT | Performed by: INTERNAL MEDICINE

## 2021-04-19 NOTE — PATIENT INSTRUCTIONS
As we discussed in the office visit, your kidney function though is significantly declined remains stable  I would like to see you back in the office in 2-3 months with repeat blood test   Continue regular follow-up primary care doctor  Continue regular follow-up with vascular surgery regarding your AV fistula  Stay well-hydrated  Follow low-salt diet  No changes in medications at this moment  You developed any nausea, vomiting, decreased urine output, increased fluid retention, shortness of breath, decreased appetite, contact us or go to the emergency department for urgent evaluation

## 2021-04-19 NOTE — PROGRESS NOTES
OFFICE FOLLOW UP - Nephrology   Rogelio Anna 77 y o  female MRN: [id number]       ASSESSMENT and PLAN:  Nuvia Palomino was seen today for chronic kidney disease and follow-up  Diagnoses and all orders for this visit:    Stage 5 chronic kidney disease not on chronic dialysis ([facility] )  -     CBC; Future  -     Renal function panel; Future  -     PTH, intact; Future    Secondary hyperparathyroidism of renal origin [facility])    Essential hypertension    Anemia in stage 5 chronic kidney disease, not on chronic dialysis ([facility])    Polycythemia    Hyperlipidemia, unspecified hyperlipidemia type    Small bowel obstruction ([facility] )        This is a 81-year-old lady with history of chronic kidney disease stage 5 with previous creatinine in the high 2 the low 3s, history of urinary incontinence, bilateral hydronephrosis secondary to obstructive uropathy and 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 in October 2016, hospitalization with incarcerated ileal conduit parastomal hernia status post ex lap parastomal hernia repair as well as severe acute kidney injury on top of CKD with creatinine on admission on the 5s, kidney function improved and went down to the low 3s, today returns to the office for follow-up  1  Chronic kidney disease stage 5 not on dialysis  In the setting of obstructive uropathy, functional solitary right kidney, episode of acute kidney injury  Creatinine lately in the low to mid threes  Patient currently is stable after recent hospitalization with SBO, no gross uremic since  Status post AV fistula creation on 01/05/2021 by Dr  Doctor  I would like to see her back in the office in 2-3 months with repeat blood and urine test   Once again discussed with patient about signs or symptoms to look for to contact us or to go to the emergency department    2  Hypertension, blood pressure acceptable, follow low-salt diet, no changes on medication at this moment       3  History of nonfunctional bladder causing bilateral hydronephrosis status in 2 months with repeat labs post supra trigonal cystectomy and ileal conduit 10/2016, continue follow-up with urologist     4  Polycythemia area, previous history of PE, on Eliquis, continue follow-up with Hematology follow-up, Dr Luisito Terry  Anemia of chronic disease  Most recent hemoglobin low at 9 1, will defer JENELLE to Hematology-Oncology    5  Mineral bone disease, PTH acceptable for her degree of kidney dysfunction, continue with Calcitrol 1 tablet 3 times a week (Monday, Wednesday, Friday)     6  Hyperlipidemia, on statins by Cardiology    7  Mild metabolic acidosis, noted most recent bicarbonate dropped to 70, will follow for now, if lower will restart sodium bicarbonate  8  History of PE, on Eliquis    9  Access, right brachiocephalic AV fistula created on 01/05/2021 by Dr Jessica Benitez Doctor  Continue follow-up with vascular        Patient Instructions   As we discussed in the office visit, your kidney function though is significantly declined remains stable  I would like to see you back in the office in 2-3 months with repeat blood test   Continue regular follow-up primary care doctor  Continue regular follow-up with vascular surgery regarding your AV fistula  Stay well-hydrated  Follow low-salt diet  No changes in medications at this moment  You developed any nausea, vomiting, decreased urine output, increased fluid retention, shortness of breath, decreased appetite, contact us or go to the emergency department for urgent evaluation  HPI: Toni Bell is a 77 y o  female who is here for No chief complaint on file  Hospitalized in 10/2018 due to a small bowel obstruction that resolved spontaneously  Hospitalized early 01/2019 status post fall, found to have a left-sided subdural hematoma, creatinine on admission 3 0 that decreased to 2 5 after intravenously fluids    Hospitalized 05/2019 after status post fall complicated with T-spine compression fracture, found to have sepsis secondary to UTI, also developed acute kidney injury on top of CKD, creatinine on admission was 3 68, renal function improved and creatinine went down to 2 59 on discharge day  Hospitalized 08/2020 with severe acute kidney injury in the setting of incarcerated ileal conduit parastomal hernia status post ex lap and parastomal hernia repair  Renal function improved with creatinine down to the low 3s  Status post right brachiocephalic AV fistula creation on 01/05/2021  Last time seen was 01/11/2021, today returns for follow up  Since last office visit she was recently hospitalized in [facility] due to small-bowel obstruction that improved with conservative management  Today in general is doing well, denies any chest pain, no shortness of breath, she has some lower extremity edema but is stable  Denies any abdominal pain, no more nausea, no vomiting, no diarrhea  Notices ileal conduit urine output stable  Denies any NSAID use  Most recent blood test on 04/15/2021 reviewed, renal function is stable with a creatinine at 3 4, hemoglobin 9 1, bicarb 17      ROS: All the systems were reviewed and were negative except as documented on the H&P              Allergies: Chlorhexidine    Medications:   Current Outpatient Medications:     acetaminophen (TYLENOL) 325 mg tablet, 650 mg every 6 hours as needed for mild pain or headache, Disp: 30 tablet, Rfl: 0    aspirin (ECOTRIN LOW STRENGTH) 81 mg EC tablet, Take 1 tablet (81 mg total) by mouth daily, Disp: 90 tablet, Rfl: 4    atorvastatin (LIPITOR) 40 mg tablet, Take 1 tablet (40 mg total) by mouth daily (Patient taking differently: Take 40 mg by mouth daily at bedtime ), Disp: 90 tablet, Rfl: 6    calcitriol (ROCALTROL) 0 25 mcg capsule, TAKE 1 CAPSULE BY MOUTH EVERY OTHER DAY, Disp: 15 capsule, Rfl: 5    Cholecalciferol (VITAMIN D3) 1000 UNITS CAPS, Take 1,000 unit marking on U-100 syringe by mouth daily  , Disp: , Rfl:     citalopram (CeleXA) 20 mg tablet, Take 20 mg by mouth daily , Disp: , Rfl:     Eliquis 2 5 MG, TAKE 1 TABLET BY MOUTH TWICE A DAY, Disp: 60 tablet, Rfl: 5    levothyroxine 75 mcg tablet, Take 75 mcg by mouth daily, Disp: , Rfl: 3    loperamide (IMODIUM) 2 mg capsule, Take 1 capsule (2 mg total) by mouth 4 (four) times a day as needed for diarrhea, Disp: 12 capsule, Rfl: 0    melatonin 3 mg, Take 1 tablet (3 mg total) by mouth daily at bedtime, Disp: 30 tablet, Rfl: 0    metoprolol tartrate (LOPRESSOR) 25 mg tablet, Take 1 tablet (25 mg total) by mouth 2 (two) times a day, Disp: 60 tablet, Rfl: 0    ruxolitinib (Jakafi) 10 mg tablet, Take 1 tablet (10 mg total) by mouth daily, Disp: 30 tablet, Rfl: 3    saccharomyces boulardii (FLORASTOR) 250 mg capsule, Take 1 capsule by mouth daily  , Disp: , Rfl:     Past Medical History:   Diagnosis Date    Anxiety     Bowel obstruction (HCC)     Chronic kidney disease (CKD), stage IV (severe) (HCC)     stage IV    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    Circulation problem     Compression fracture of cervical spine (HCC)     Hernia of abdominal cavity     History of kidney problems     Hydronephrosis     Hypertension     IBS (irritable bowel syndrome)     Incontinence     Lung mass     Improving on PET/CT 1/2016    Polycythemia vera ([facility] )     Pulmonary embolism ([facility] ) 2014    Shingles     Urinary tract infection      Past Surgical History:   Procedure Laterality Date    ABDOMINAL ADHESION SURGERY N/A 8/9/2020    Procedure: LYSIS ADHESIONS;  Surgeon: Jadon Sher DO;  [facility];  Service: General    BLADDER SUSPENSION      BOTOX INJECTION N/A 7/27/2016    Procedure: BOTOX INJECTION ;  Surgeon: Nely Wolf MD;  [facility];  Service:     CHOLECYSTECTOMY N/A     COLONOSCOPY      CYSTECTOMY, RADICAL WITH ILEOCONDUIT N/A 10/4/2016    Procedure: SUPRATRIGONAL CYSTECTOMY WITH ILEAL CONDUIT ;  Surgeon: Lori Cm MD;  [facility];  Service:    Carry Vinod W/ RETROGRADES Bilateral 7/27/2016    Procedure: Poonam Perez; RETROGRADE PYELOGRAM ;  Surgeon: Lori Cm MD;  [facility];  Service:     HERNIA REPAIR      IR AV FISTULAGRAM/GRAFTOGRAM  2/10/2021    IR NON-TUNNELED CENTRAL LINE PLACEMENT  7/17/2020    IR NON-TUNNELED CENTRAL LINE PLACEMENT  8/14/2020    LAPAROTOMY N/A 8/9/2020    Procedure: LAPAROTOMY EXPLORATORY, PARASTOMAL HERNIA REPAIR WITH MESH;  Surgeon: Milena Gutierrez DO;  [facility];  Service: General    MS ANASTOMOSIS,AV,ANY SITE Right 1/5/2021    Procedure: Creation of right brachiobasilic fistula; Surgeon: Harpreet Chairez MD;  [facility];  Service: Vascular    MS COLONOSCOPY FLX DX W/COLLJ SPEC WHEN PFRMD N/A 8/31/2016    Procedure: COLONOSCOPY;  Surgeon: Rula Blum MD;  Location: [facility]; Service: Gastroenterology    MS CYSTOSCOPY,INSERT URETERAL STENT Bilateral 7/27/2016    Procedure: STENT INSERTION; EXCISION OF MESH ;  Surgeon: Lori Cm MD;  [facility];  Service: Urology    TONSILLECTOMY      TUBAL LIGATION      WISDOM TOOTH EXTRACTION       Family History   Problem Relation Age of Onset    Cancer Mother         small cell cancer     Heart disease Father     COPD Father     Heart disease Brother     Nephrolithiasis Brother       reports that she has never smoked  She has never used smokeless tobacco  She reports previous alcohol use  She reports previous drug use  Physical Exam:   Vitals:    04/19/21 1522   BP: 140/70   BP Location: Left arm   Patient Position: Sitting   Cuff Size: Extra-Large   Pulse: 61   Weight: 88 kg (194 lb)   Height: 5' 2" (1 575 m)     Body mass index is 35 48 kg/m²        General: conscious, cooperative, in not acute distress  Eyes: conjunctivae pink, anicteric sclerae  ENT: lips and mucous membranes moist  Neck: supple, no JVD  Chest: clear breath sounds bilateral, no crackles, ronchus or wheezings  CVS: distinct S1 & S2, normal rate, regular rhythm  Abdomen: obese, non-tender, non-distended, normoactive bowel sounds  Back: no CVA tenderness  Extremities: no significant edema of both legs, av fistula over right upper extremity with positive thrill and bruit  Skin: no rash  Neuro: awake, alert, oriented              Laboratory Results:  Lab Results   Component Value Date    WBC 5 87 04/15/2021    HGB 9 1 (L) 04/15/2021    HCT 29 0 (L) 04/15/2021    MCV 98 04/15/2021     04/15/2021     Lab Results   Component Value Date    SODIUM 138 04/15/2021    K 4 1 04/15/2021     (H) 04/15/2021    CO2 17 (L) 04/15/2021    BUN 50 (H) 04/15/2021    CREATININE 3 41 (H) 04/15/2021    GLUC 98 04/03/2021    CALCIUM 8 4 04/15/2021       Lab Results   Component Value Date     9 (H) 04/15/2021    CALCIUM 8 4 04/15/2021    PHOS 4 0 04/15/2021             Portions of the record may have been created with voice recognition software  Occasional wrong word or "sound a like" substitutions may have occurred due to the inherent limitations of voice recognition software  Read the chart carefully and recognize, using context, where substitutions have occurred  If you have any questions, please contact the dictating provider

## 2021-04-23 ENCOUNTER — OFFICE VISIT (OUTPATIENT)
Dept: HEMATOLOGY ONCOLOGY | Facility: CLINIC | Age: 76
End: 2021-04-23
Payer: COMMERCIAL

## 2021-04-23 VITALS
BODY MASS INDEX: 36.07 KG/M2 | OXYGEN SATURATION: 98 % | TEMPERATURE: 97 F | RESPIRATION RATE: 16 BRPM | HEART RATE: 65 BPM | HEIGHT: 62 IN | DIASTOLIC BLOOD PRESSURE: 80 MMHG | SYSTOLIC BLOOD PRESSURE: 140 MMHG | WEIGHT: 196 LBS

## 2021-04-23 DIAGNOSIS — N18.30 ANEMIA DUE TO STAGE 3 CHRONIC KIDNEY DISEASE, UNSPECIFIED WHETHER STAGE 3A OR 3B CKD (HCC): ICD-10-CM

## 2021-04-23 DIAGNOSIS — I26.92 CHRONIC SADDLE PULMONARY EMBOLISM WITHOUT ACUTE COR PULMONALE (HCC): Chronic | ICD-10-CM

## 2021-04-23 DIAGNOSIS — D45 POLYCYTHEMIA VERA (HCC): Primary | ICD-10-CM

## 2021-04-23 DIAGNOSIS — D63.1 ANEMIA DUE TO STAGE 3 CHRONIC KIDNEY DISEASE, UNSPECIFIED WHETHER STAGE 3A OR 3B CKD (HCC): ICD-10-CM

## 2021-04-23 DIAGNOSIS — D63.1 ANEMIA IN STAGE 5 CHRONIC KIDNEY DISEASE, NOT ON CHRONIC DIALYSIS (HCC): Primary | ICD-10-CM

## 2021-04-23 DIAGNOSIS — I27.82 CHRONIC SADDLE PULMONARY EMBOLISM WITHOUT ACUTE COR PULMONALE (HCC): Chronic | ICD-10-CM

## 2021-04-23 DIAGNOSIS — N18.5 ANEMIA IN STAGE 5 CHRONIC KIDNEY DISEASE, NOT ON CHRONIC DIALYSIS (HCC): Primary | ICD-10-CM
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
## 2021-04-23 PROBLEM — R19.7 DIARRHEA: Status: RESOLVED | Noted: 2019-01-11 | Resolved: 2021-04-23

## 2021-04-23 PROCEDURE — 99214 OFFICE O/P EST MOD 30 MIN: CPT | Performed by: INTERNAL MEDICINE

## 2021-04-23 NOTE — PROGRESS NOTES
Hematology Outpatient Follow - Up Note  Leatha Tillman 77 y o  female MRN: @ Encounter: [id number]        Date:  4/23/2021        Assessment/ Plan:     3 78-year-old  female with polycythemia vera positive for JAK2 mutation she had been on ruxolitinib 10 mg p o  daily since 2016 with significant improvement of constitutional symptoms, disappearance of splenomegaly    2  Multiple DVT/ PE, she is on apixaban 2 5 mg p o  b i d  indefinitely   3  Chronic kidney disease grade 3-4, status post AV fistula for preparation for hemodialysis in the future, she is on Aranesp 100 micro g every month if hemoglobin below 9 9    4  Growing right adnexal cystic structure, growing since 2015, I suggest Gynecology evaluation however she will refer that you   5  Follow-up in 6 months with CBC, BMP, ferritin        Labs and imaging studies are reviewed by ordering provider once results are available  If there are findings that need immediate attention, you will be contacted when results available  Discussing results and the implication on your healthcare is best discussed in person at your follow-up visit  HPI:    She is 78-year-old  female with history of polycythemia vera diagnosed in 2006 with thrombocytosis, erythrocytosis with hemoglobin of 20 6, hematocrit 54, positive for JAK2 mutation diagnosed at [facility] had been on hydroxyurea also phlebotomy     She had chronic lower extremity deep venous thrombosis diagnosed in 2007 treated with Coumadin and later on she had large saddle pulmonary embolus in the right upper lobe branches as well in 07/2017 had been on apixaban 2 5 mg p o  B i d  Status post cystectomy with ileal conduit secondary to chronic hydronephrosis status post removal of IVC filter in 12/2016     Because of constitutional symptoms, fatigue, pruritus, the patient initiated on ruxolitinib 10 mg p o  B i d   In June 2016 and underwent bladder resection with ileostomy and admitted in February 2018 with small-bowel obstruction and then another admission in October 2018 with small-bowel obstruction secondary to scar formation     We reduced ruxolitinib because of pancytopenia to 10 mg p o  Daily she had significant clinical improvement no evidence of splenomegaly anymore    Herpes zoster on 08/2020     Right adnexal cyst since 2015 increasing in size, decline Gynecology evaluation     Admitted to the hospital for abdominal obstruction with scar formation treated conservatively with NG tube       Interval History:        Previous Treatment:         Test Results:    Imaging: Ct Abdomen Pelvis Wo Contrast    Result Date: 4/1/2021  Narrative: CT ABDOMEN AND PELVIS WITHOUT IV CONTRAST INDICATION:   Abdominal distension Nausea/vomiting hernia, obstruction with hx/surgery 6months ago, no BM; brown emesis  COMPARISON:  August 9, 2020 TECHNIQUE:  CT examination of the abdomen and pelvis was performed without intravenous contrast   Axial, sagittal, and coronal 2D reformatted images were created from the source data and submitted for interpretation  Radiation dose length product (DLP) for this visit:  826 81 mGy-cm   This examination, like all CT scans performed in the [facility], was performed utilizing techniques to minimize radiation dose exposure, including the use of iterative  reconstruction and automated exposure control  Enteric contrast was not administered  FINDINGS: ABDOMEN LOWER CHEST:  No clinically significant abnormality identified in the visualized lower chest  LIVER/BILIARY TREE:  Unremarkable  GALLBLADDER:  Gallbladder is surgically absent  SPLEEN:  Unremarkable  PANCREAS:  Unremarkable  ADRENAL GLANDS:  Unremarkable  KIDNEYS/URETERS:  Advanced left renal atrophy  Bilateral nonobstructing renal calculi measuring up to 12 mm on the right  STOMACH AND BOWEL:  There is dilatation of the proximal and mid small bowel with air-fluid levels    There is decompression of the distal small bowel and colon  Findings are consistent with a high-grade or complete small bowel obstruction  The transition point appears to be in the left lower quadrant (series 2 images 50-55)  Right lower quadrant ileal conduit ostomy with interval repair of the parastomal hernia  APPENDIX:  No findings to suggest appendicitis  ABDOMINOPELVIC CAVITY:  No ascites  No pneumoperitoneum  No lymphadenopathy  VESSELS:  Unremarkable for patient's age  PELVIS REPRODUCTIVE ORGANS:  8 1 x 3 7 cm right adnexal cystic structure is unchanged in size from the most recent study, but has increased in size since July 14, 2015 when it measured 4 5 x 4 4 cm  Differential considerations include a fluid-filled ileal conduit, ovarian cyst, paraovarian cyst, or fallopian tube  URINARY BLADDER:  Status post cystectomy ABDOMINAL WALL/INGUINAL REGIONS:  Unremarkable  OSSEOUS STRUCTURES:  No acute fracture or destructive osseous lesion  Stable T12 compression fracture  Impression: High-grade versus complete distal small bowel obstruction with a transition point in the left lower quadrant  Left renal atrophy  Bilateral nonobstructing renal calculi  Status post cystectomy  Cystic structure in the right lower quadrant consistent with a fluid-filled ileal conduit, ovarian/paraovarian cyst, or dilated fallopian tube which has increased in size since July 14, 2015  The study was marked in [facility] for immediate notification  Workstation performed: [id number]     Fl Small Bowel    Result Date: 4/2/2021  Narrative: SMALL BOWEL FOLLOW THROUGH INDICATION:   r/o persistent sbo  Gastrografin challenge  COMPARISON:   CT scan from yesterday  IMAGES:  3 TECHNIQUE: Gastrografin contrast was administered to the patient and followed through the small bowel to the colon utilizing overhead radiographs at periodic intervals  FINDINGS: Persistent high-grade small bowel obstruction    However, at 6 hours the contrast has entered the colon and is seen in the rectum  Impression: Persistent high-grade small bowel obstruction  However, at 6 hours, the contrast has entered the colon and is seen in the rectum  Workstation performed: [id number]       Labs:   Lab Results   Component Value Date    WBC 5 87 04/15/2021    HGB 9 1 (L) 04/15/2021    HCT 29 0 (L) 04/15/2021    MCV 98 04/15/2021     04/15/2021     Lab Results   Component Value Date     12/17/2015    K 4 1 04/15/2021     (H) 04/15/2021    CO2 17 (L) 04/15/2021    ANIONGAP 9 12/17/2015    BUN 50 (H) 04/15/2021    CREATININE 3 41 (H) 04/15/2021    GLUCOSE 138 10/04/2016    GLUF 87 04/15/2021    CALCIUM 8 4 04/15/2021    CORRECTEDCA 9 0 04/15/2021    AST 19 04/15/2021    ALT 14 04/15/2021    ALKPHOS 78 04/15/2021    PROT 6 2 (L) 11/06/2015    BILITOT 0 49 11/06/2015    EGFR 13 04/15/2021       Lab Results   Component Value Date    IRON 54 08/26/2020    TIBC 202 (L) 08/26/2020    FERRITIN 265 08/26/2020       Lab Results   Component Value Date    HNGHRFTT28 351 10/19/2020         ROS: Review of Systems   Constitutional: Positive for fatigue  Negative for appetite change, chills, diaphoresis and unexpected weight change  HENT:   Negative for mouth sores, nosebleeds, sore throat, trouble swallowing and voice change  Eyes: Negative for eye problems and icterus  Respiratory: Negative for chest tightness, cough, hemoptysis and wheezing  Cardiovascular: Negative for chest pain, leg swelling and palpitations  Gastrointestinal: Negative for abdominal distention, abdominal pain, blood in stool, constipation, diarrhea, nausea and vomiting  Endocrine: Negative for hot flashes  Genitourinary: Negative for bladder incontinence, difficulty urinating, dyspareunia, dysuria and frequency  Musculoskeletal: Negative for arthralgias, back pain, gait problem, neck pain and neck stiffness  Skin: Negative for itching and rash     Neurological: Negative for dizziness, gait problem, headaches, numbness, seizures and speech difficulty  Hematological: Negative for adenopathy  Does not bruise/bleed easily  Psychiatric/Behavioral: Positive for depression and sleep disturbance  Negative for decreased concentration and suicidal ideas  The patient is nervous/anxious  Current Medications: Reviewed  Allergies: Reviewed  PMH/FH/SH:  Reviewed      Physical Exam:    Body surface area is 1 9 meters squared  Wt Readings from Last 3 Encounters:   04/23/21 88 9 kg (196 lb)   04/19/21 88 kg (194 lb)   04/01/21 86 2 kg (190 lb 0 6 oz)        Temp Readings from Last 3 Encounters:   04/23/21 (!) 97 °F (36 1 °C) (Temporal)   04/04/21 98 3 °F (36 8 °C) (Oral)   02/10/21 97 9 °F (36 6 °C) (Temporal)        BP Readings from Last 3 Encounters:   04/23/21 140/80   04/19/21 140/70   04/04/21 147/78         Pulse Readings from Last 3 Encounters:   04/23/21 65   04/19/21 61   04/04/21 78        Physical Exam  Vitals signs reviewed  Constitutional:       General: She is not in acute distress  Appearance: She is well-developed  She is not diaphoretic  HENT:      Head: Normocephalic and atraumatic  Eyes:      Conjunctiva/sclera: Conjunctivae normal    Neck:      Musculoskeletal: Normal range of motion and neck supple  Trachea: No tracheal deviation  Cardiovascular:      Rate and Rhythm: Normal rate and regular rhythm  Heart sounds: Murmur present  No friction rub  No gallop  Pulmonary:      Effort: Pulmonary effort is normal  No respiratory distress  Breath sounds: Normal breath sounds  No wheezing or rales  Chest:      Chest wall: No tenderness  Abdominal:      General: There is no distension  Palpations: Abdomen is soft  Tenderness: There is no abdominal tenderness  Musculoskeletal:      Right lower leg: Edema present  Left lower leg: Edema present  Lymphadenopathy:      Cervical: No cervical adenopathy  Skin:     General: Skin is warm and dry  Coloration: Skin is not pale  Findings: No erythema  Neurological:      Mental Status: She is alert and oriented to person, place, and time  Psychiatric:         Behavior: Behavior normal          Thought Content: Thought content normal          Judgment: Judgment normal          ECO    Goals and Barriers:  Current Goal: Minimize effects of disease  Barriers: None  Patient's Capacity to Self Care:  Patient is able to self care      Code Status: [unfilled]

## 2021-04-26 ENCOUNTER — HOSPITAL ENCOUNTER (OUTPATIENT)
Dept: INFUSION CENTER | Facility: HOSPITAL | Age: 76
Discharge: HOME | End: 2021-04-26
Attending: INTERNAL MEDICINE
Payer: COMMERCIAL

## 2021-04-26 VITALS — DIASTOLIC BLOOD PRESSURE: 80 MMHG | HEART RATE: 63 BPM | SYSTOLIC BLOOD PRESSURE: 158 MMHG | TEMPERATURE: 97.2 F

## 2021-04-26 DIAGNOSIS — N18.5 ANEMIA IN STAGE 5 CHRONIC KIDNEY DISEASE, NOT ON CHRONIC DIALYSIS (HCC): Primary | ICD-10-CM

## 2021-04-26 DIAGNOSIS — D63.1 ANEMIA IN STAGE 5 CHRONIC KIDNEY DISEASE, NOT ON CHRONIC DIALYSIS (HCC): Primary | ICD-10-CM

## 2021-04-26 PROCEDURE — 96372 THER/PROPH/DIAG INJ SC/IM: CPT

## 2021-04-26 RX ADMIN — DARBEPOETIN ALFA 100 MCG: 100 INJECTION, SOLUTION INTRAVENOUS; SUBCUTANEOUS at 10:35

## 2021-04-26 NOTE — PROGRESS NOTES
Ptnt tolerated Aranesp injection today without incident  Ptnt made aware of next appt and reminded to get labs drawn prior  AVS printed

## 2021-05-08 ENCOUNTER — APPOINTMENT (EMERGENCY)
Dept: RADIOLOGY | Facility: HOSPITAL | Age: 76
DRG: 690 | End: 2021-05-08
Payer: COMMERCIAL

## 2021-05-08 ENCOUNTER — HOSPITAL ENCOUNTER (INPATIENT)
Facility: HOSPITAL | Age: 76
LOS: 8 days | Discharge: HOME HEALTH | DRG: 690 | End: 2021-05-16
Attending: EMERGENCY MEDICINE | Admitting: GENERAL PRACTICE
Payer: COMMERCIAL

## 2021-05-08 DIAGNOSIS — N13.9 OBSTRUCTIVE UROPATHY: ICD-10-CM

## 2021-05-08 DIAGNOSIS — N13.30 HYDRONEPHROSIS: ICD-10-CM

## 2021-05-08 DIAGNOSIS — N11.1 OBSTRUCTIVE PYELONEPHRITIS: ICD-10-CM

## 2021-05-08 DIAGNOSIS — N18.5 STAGE 5 CHRONIC KIDNEY DISEASE NOT ON CHRONIC DIALYSIS (HCC): ICD-10-CM

## 2021-05-08 DIAGNOSIS — N39.0 URINARY TRACT INFECTION: ICD-10-CM

## 2021-05-08 DIAGNOSIS — K59.00 CONSTIPATION: ICD-10-CM

## 2021-05-08 DIAGNOSIS — D64.9 ANEMIA: ICD-10-CM

## 2021-05-08 DIAGNOSIS — R31.9 HEMATURIA: Primary | ICD-10-CM

## 2021-05-08 PROBLEM — R31.0 GROSS HEMATURIA: Status: ACTIVE | Noted: 2021-05-08

## 2021-05-08 LAB
ABO GROUP BLD: NORMAL
ALBUMIN SERPL BCP-MCNC: 2.9 G/DL (ref 3.5–5)
ALP SERPL-CCNC: 85 U/L (ref 46–116)
ALT SERPL W P-5'-P-CCNC: 12 U/L (ref 12–78)
ANION GAP SERPL CALCULATED.3IONS-SCNC: 9 MMOL/L (ref 4–13)
APAP SERPL-MCNC: <2 UG/ML (ref 10–20)
APTT PPP: 34 SECONDS (ref 23–37)
AST SERPL W P-5'-P-CCNC: 16 U/L (ref 5–45)
BACTERIA UR QL AUTO: ABNORMAL /HPF
BASOPHILS # BLD AUTO: 0.03 THOUSANDS/ΜL (ref 0–0.1)
BASOPHILS NFR BLD AUTO: 1 % (ref 0–1)
BILIRUB DIRECT SERPL-MCNC: 0.17 MG/DL (ref 0–0.2)
BILIRUB SERPL-MCNC: 0.56 MG/DL (ref 0.2–1)
BILIRUB UR QL STRIP: ABNORMAL
BLD GP AB SCN SERPL QL: NEGATIVE
BUN SERPL-MCNC: 42 MG/DL (ref 5–25)
CALCIUM SERPL-MCNC: 8.9 MG/DL (ref 8.3–10.1)
CHLORIDE SERPL-SCNC: 109 MMOL/L (ref 100–108)
CK SERPL-CCNC: 91 U/L (ref 26–192)
CLARITY UR: ABNORMAL
CO2 SERPL-SCNC: 19 MMOL/L (ref 21–32)
COLOR UR: ABNORMAL
CREAT SERPL-MCNC: 3.68 MG/DL (ref 0.6–1.3)
EOSINOPHIL # BLD AUTO: 0.03 THOUSAND/ΜL (ref 0–0.61)
EOSINOPHIL NFR BLD AUTO: 1 % (ref 0–6)
ERYTHROCYTE [DISTWIDTH] IN BLOOD BY AUTOMATED COUNT: 13.8 % (ref 11.6–15.1)
GFR SERPL CREATININE-BSD FRML MDRD: 12 ML/MIN/1.73SQ M
GLUCOSE SERPL-MCNC: 100 MG/DL (ref 65–140)
GLUCOSE UR STRIP-MCNC: ABNORMAL MG/DL
HCT VFR BLD AUTO: 25.7 % (ref 34.8–46.1)
HCT VFR BLD AUTO: 27.8 % (ref 34.8–46.1)
HGB BLD-MCNC: 7.9 G/DL (ref 11.5–15.4)
HGB BLD-MCNC: 8.7 G/DL (ref 11.5–15.4)
HGB UR QL STRIP.AUTO: ABNORMAL
IMM GRANULOCYTES # BLD AUTO: 0.06 THOUSAND/UL (ref 0–0.2)
IMM GRANULOCYTES NFR BLD AUTO: 1 % (ref 0–2)
INR PPP: 1.7 (ref 0.84–1.19)
KETONES UR STRIP-MCNC: ABNORMAL MG/DL
LEUKOCYTE ESTERASE UR QL STRIP: ABNORMAL
LYMPHOCYTES # BLD AUTO: 0.83 THOUSANDS/ΜL (ref 0.6–4.47)
LYMPHOCYTES NFR BLD AUTO: 16 % (ref 14–44)
MCH RBC QN AUTO: 29.7 PG (ref 26.8–34.3)
MCHC RBC AUTO-ENTMCNC: 31.3 G/DL (ref 31.4–37.4)
MCV RBC AUTO: 95 FL (ref 82–98)
MONOCYTES # BLD AUTO: 0.56 THOUSAND/ΜL (ref 0.17–1.22)
MONOCYTES NFR BLD AUTO: 11 % (ref 4–12)
NEUTROPHILS # BLD AUTO: 3.84 THOUSANDS/ΜL (ref 1.85–7.62)
NEUTS SEG NFR BLD AUTO: 70 % (ref 43–75)
NITRITE UR QL STRIP: ABNORMAL
NON-SQ EPI CELLS URNS QL MICRO: ABNORMAL /HPF
NRBC BLD AUTO-RTO: 0 /100 WBCS
PH UR STRIP.AUTO: ABNORMAL [PH]
PLATELET # BLD AUTO: 395 THOUSANDS/UL (ref 149–390)
PMV BLD AUTO: 9.8 FL (ref 8.9–12.7)
POTASSIUM SERPL-SCNC: 5 MMOL/L (ref 3.5–5.3)
PROT SERPL-MCNC: 6.2 G/DL (ref 6.4–8.2)
PROT UR STRIP-MCNC: ABNORMAL MG/DL
PROTHROMBIN TIME: 19.9 SECONDS (ref 11.6–14.5)
RBC # BLD AUTO: 2.93 MILLION/UL (ref 3.81–5.12)
RBC #/AREA URNS AUTO: ABNORMAL /HPF
RH BLD: POSITIVE
SALICYLATES SERPL-MCNC: <3 MG/DL (ref 3–20)
SODIUM SERPL-SCNC: 137 MMOL/L (ref 136–145)
SP GR UR STRIP.AUTO: 1.01 (ref 1–1.03)
SPECIMEN EXPIRATION DATE: NORMAL
TROPONIN I SERPL-MCNC: <0.02 NG/ML
UROBILINOGEN UR QL STRIP.AUTO: ABNORMAL E.U./DL
WBC # BLD AUTO: 5.35 THOUSAND/UL (ref 4.31–10.16)
WBC #/AREA URNS AUTO: ABNORMAL /HPF

## 2021-05-08 PROCEDURE — 85014 HEMATOCRIT: CPT | Performed by: GENERAL PRACTICE

## 2021-05-08 PROCEDURE — 84484 ASSAY OF TROPONIN QUANT: CPT | Performed by: EMERGENCY MEDICINE

## 2021-05-08 PROCEDURE — 80048 BASIC METABOLIC PNL TOTAL CA: CPT | Performed by: EMERGENCY MEDICINE

## 2021-05-08 PROCEDURE — 85018 HEMOGLOBIN: CPT | Performed by: GENERAL PRACTICE

## 2021-05-08 PROCEDURE — 85610 PROTHROMBIN TIME: CPT | Performed by: EMERGENCY MEDICINE

## 2021-05-08 PROCEDURE — 93005 ELECTROCARDIOGRAM TRACING: CPT

## 2021-05-08 PROCEDURE — 80143 DRUG ASSAY ACETAMINOPHEN: CPT | Performed by: EMERGENCY MEDICINE

## 2021-05-08 PROCEDURE — 99285 EMERGENCY DEPT VISIT HI MDM: CPT | Performed by: EMERGENCY MEDICINE

## 2021-05-08 PROCEDURE — 99223 1ST HOSP IP/OBS HIGH 75: CPT | Performed by: GENERAL PRACTICE

## 2021-05-08 PROCEDURE — 85025 COMPLETE CBC W/AUTO DIFF WBC: CPT | Performed by: EMERGENCY MEDICINE

## 2021-05-08 PROCEDURE — 86850 RBC ANTIBODY SCREEN: CPT | Performed by: GENERAL PRACTICE

## 2021-05-08 PROCEDURE — 74176 CT ABD & PELVIS W/O CONTRAST: CPT

## 2021-05-08 PROCEDURE — 85730 THROMBOPLASTIN TIME PARTIAL: CPT | Performed by: EMERGENCY MEDICINE

## 2021-05-08 PROCEDURE — 87086 URINE CULTURE/COLONY COUNT: CPT | Performed by: EMERGENCY MEDICINE

## 2021-05-08 PROCEDURE — G1004 CDSM NDSC: HCPCS

## 2021-05-08 PROCEDURE — 82550 ASSAY OF CK (CPK): CPT | Performed by: EMERGENCY MEDICINE

## 2021-05-08 PROCEDURE — 73564 X-RAY EXAM KNEE 4 OR MORE: CPT

## 2021-05-08 PROCEDURE — 86900 BLOOD TYPING SEROLOGIC ABO: CPT | Performed by: GENERAL PRACTICE

## 2021-05-08 PROCEDURE — 36415 COLL VENOUS BLD VENIPUNCTURE: CPT | Performed by: EMERGENCY MEDICINE

## 2021-05-08 PROCEDURE — 99285 EMERGENCY DEPT VISIT HI MDM: CPT

## 2021-05-08 PROCEDURE — 86901 BLOOD TYPING SEROLOGIC RH(D): CPT | Performed by: GENERAL PRACTICE

## 2021-05-08 PROCEDURE — 81001 URINALYSIS AUTO W/SCOPE: CPT | Performed by: EMERGENCY MEDICINE

## 2021-05-08 PROCEDURE — 80076 HEPATIC FUNCTION PANEL: CPT | Performed by: EMERGENCY MEDICINE

## 2021-05-08 PROCEDURE — 80179 DRUG ASSAY SALICYLATE: CPT | Performed by: EMERGENCY MEDICINE

## 2021-05-08 RX ORDER — LANOLIN ALCOHOL/MO/W.PET/CERES
3 CREAM (GRAM) TOPICAL
Status: DISCONTINUED | OUTPATIENT
Start: 2021-05-08 | End: 2021-05-16 | Stop reason: HOSPADM

## 2021-05-08 RX ORDER — CALCITRIOL 0.25 UG/1
0.25 CAPSULE, LIQUID FILLED ORAL EVERY OTHER DAY
Status: DISCONTINUED | OUTPATIENT
Start: 2021-05-08 | End: 2021-05-16 | Stop reason: HOSPADM

## 2021-05-08 RX ORDER — ACETAMINOPHEN 325 MG/1
650 TABLET ORAL EVERY 6 HOURS PRN
Status: DISCONTINUED | OUTPATIENT
Start: 2021-05-08 | End: 2021-05-08

## 2021-05-08 RX ORDER — SODIUM CHLORIDE 9 MG/ML
50 INJECTION, SOLUTION INTRAVENOUS CONTINUOUS
Status: DISPENSED | OUTPATIENT
Start: 2021-05-08 | End: 2021-05-09

## 2021-05-08 RX ORDER — ATORVASTATIN CALCIUM 40 MG/1
40 TABLET, FILM COATED ORAL
Status: DISCONTINUED | OUTPATIENT
Start: 2021-05-08 | End: 2021-05-16 | Stop reason: HOSPADM

## 2021-05-08 RX ORDER — ACETAMINOPHEN 325 MG/1
650 TABLET ORAL EVERY 6 HOURS PRN
Status: DISCONTINUED | OUTPATIENT
Start: 2021-05-08 | End: 2021-05-16 | Stop reason: HOSPADM

## 2021-05-08 RX ORDER — SACCHAROMYCES BOULARDII 250 MG
250 CAPSULE ORAL DAILY
Status: DISCONTINUED | OUTPATIENT
Start: 2021-05-09 | End: 2021-05-16 | Stop reason: HOSPADM

## 2021-05-08 RX ORDER — LEVOTHYROXINE SODIUM 0.07 MG/1
75 TABLET ORAL
Status: DISCONTINUED | OUTPATIENT
Start: 2021-05-09 | End: 2021-05-16 | Stop reason: HOSPADM

## 2021-05-08 RX ORDER — MELATONIN
1000 DAILY
Status: DISCONTINUED | OUTPATIENT
Start: 2021-05-09 | End: 2021-05-16 | Stop reason: HOSPADM

## 2021-05-08 RX ORDER — CITALOPRAM 20 MG/1
20 TABLET ORAL DAILY
Status: DISCONTINUED | OUTPATIENT
Start: 2021-05-09 | End: 2021-05-16 | Stop reason: HOSPADM

## 2021-05-08 RX ADMIN — SODIUM CHLORIDE 50 ML/HR: 0.9 INJECTION, SOLUTION INTRAVENOUS at 14:59

## 2021-05-08 RX ADMIN — METOPROLOL TARTRATE 25 MG: 25 TABLET, FILM COATED ORAL at 17:12

## 2021-05-08 RX ADMIN — ATORVASTATIN CALCIUM 40 MG: 40 TABLET, FILM COATED ORAL at 17:12

## 2021-05-08 RX ADMIN — MELATONIN TAB 3 MG 3 MG: 3 TAB at 21:10

## 2021-05-08 NOTE — H&P
[address] [id number], 77 y o  female MRN: [id number]  Unit/Bed#: [facility] 803-01 Encounter: [id number]  Primary Care Provider: Kiki Puckett MD   Date and time admitted to hospital: 5/8/2021 11:18 AM    * Gross hematuria  Assessment & Plan  · Urology consulted  · Monitor H/H  · Blood consent signed in case Hgb < 7    Hydronephrosis of left kidney  Assessment & Plan  · IR and urology consulted  · Plan for perc neph possibly Monday or Tuesday when off Eliquis and ASA    Hyperlipemia  Assessment & Plan  · Statin  · If Eliquis to be restarted at d/c, unsure of benefit of ASA    Stage 5 chronic kidney disease not on chronic dialysis [facility])  Assessment & Plan  Lab Results   Component Value Date    EGFR 12 05/08/2021    EGFR 13 04/15/2021    EGFR 13 04/03/2021    CREATININE 3 68 (H) 05/08/2021    CREATININE 3 41 (H) 04/15/2021    CREATININE 3 39 (H) 04/03/2021   Estimated Creatinine Clearance: 13 4 mL/min (A) (by C-G formula based on SCr of 3 68 mg/dL (H))  · Monitor Cr  · Cr near baseline 3 4-3 5  · Gentle NS    Obstructive uropathy  Assessment & Plan  · S/p ileostomy for nonfunctioning bladder and chronic hydro  · Urology consult    Chronic saddle pulmonary embolism (HCC)  Assessment & Plan  · Low dose Eliquis held  · Consider HSQ if Hgb stable while awaiting perc neph - would obviously need to hold day of procedure    VTE Prophylaxis: Pharmacologic VTE Prophylaxis contraindicated due to hematuria  / sequential compression device   Code Status: Full  POLST: POLST form is not discussed and not completed at this time  Discussion with family: no    Anticipated Length of Stay:  Patient will be admitted on an Inpatient basis with an anticipated length of stay of  At least 2 midnights  Justification for Hospital Stay: need for eventual perc neph    Total Time for Visit, including Counseling / Coordination of Care: 60 minutes    Greater than 50% of this total time spent on direct patient counseling and coordination of care  Chief Complaint:   hematuria    History of Present Illness:    Yoly Vásquez is a 77 y o  female s/p ileostomy for nonfunctioning bladder w/ chronic hydro who presents with worsening hematuria over the past week  Today noted wine colored urine so presented to ER  Decreased appetite butn o fever or cp or sob or n/v  Takes Eliquis for PE hx and ASA for HLD  Took both meds this AM   Has CKD5 for which she sees renal     Review of Systems:    Review of Systems   Constitutional: Negative  HENT: Negative  Eyes: Negative  Respiratory: Negative  Cardiovascular: Negative  Gastrointestinal: Negative  Endocrine: Negative  Genitourinary: Positive for hematuria  Musculoskeletal: Negative  Skin: Negative  Allergic/Immunologic: Negative  Neurological: Negative  Hematological: Negative  Psychiatric/Behavioral: Negative          Past Medical and Surgical History:     Past Medical History:   Diagnosis Date    Anxiety     Bowel obstruction (HCC)     Chronic kidney disease (CKD), stage IV (severe) (HCC)     stage IV    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    Circulation problem     Compression fracture of cervical spine (HCC)     Hernia of abdominal cavity     History of kidney problems     Hydronephrosis     Hypertension     IBS (irritable bowel syndrome)     Incontinence     Lung mass     Improving on PET/CT 1/2016    [facility]mia vera ([facility] [address]     Pulmonary embolism ([facility] [address] ) 2014    Shingles     Urinary tract infection        Past Surgical History:   Procedure Laterality Date    ABDOMINAL ADHESION SURGERY N/A 8/9/2020    Procedure: LYSIS ADHESIONS;  Surgeon: Aubrie Dixon DO;  [facility];  Service: General    BLADDER SUSPENSION      BOTOX INJECTION N/A 7/27/2016    Procedure: BOTOX INJECTION ;  Surgeon: Kat Meléndez MD;  [facility];  Service:     CHOLECYSTECTOMY N/A  COLONOSCOPY      CYSTECTOMY, RADICAL WITH ILEOCONDUIT N/A 10/4/2016    Procedure: SUPRATRIGONAL CYSTECTOMY WITH ILEAL CONDUIT ;  Surgeon: Sheyla Ewing MD;  [facility];  Service:    Teaganala Meek CYSTOSCOPY W/ RETROGRADES Bilateral 7/27/2016    Procedure: Tatyana Zhou; RETROGRADE PYELOGRAM ;  Surgeon: Sheyla Ewing MD;  [facility];  Service:     HERNIA REPAIR      IR AV FISTULAGRAM/GRAFTOGRAM  2/10/2021    IR NON-TUNNELED CENTRAL LINE PLACEMENT  7/17/2020    IR NON-TUNNELED CENTRAL LINE PLACEMENT  8/14/2020    LAPAROTOMY N/A 8/9/2020    Procedure: LAPAROTOMY EXPLORATORY, PARASTOMAL HERNIA REPAIR WITH MESH;  Surgeon: Malcolm Luque DO;  [facility];  Service: General    IN ANASTOMOSIS,AV,ANY SITE Right 1/5/2021    Procedure: Creation of right brachiobasilic fistula; Surgeon: Sumaya Chairez MD;  [facility];  Service: Vascular    IN COLONOSCOPY FLX DX W/COLLJ SPEC WHEN PFRMD N/A 8/31/2016    Procedure: COLONOSCOPY;  Surgeon: Melissa Haas MD;  Location: [facility]; Service: Gastroenterology    IN CYSTOSCOPY,INSERT URETERAL STENT Bilateral 7/27/2016    Procedure: STENT INSERTION; EXCISION OF MESH ;  Surgeon: Sheyla Ewing MD;  [facility];  Service: Urology    TONSILLECTOMY      TUBAL LIGATION      WISDOM TOOTH EXTRACTION         Meds/Allergies:    Prior to Admission medications    Medication Sig Start Date End Date Taking? Authorizing Provider   aspirin (ECOTRIN LOW STRENGTH) 81 mg EC tablet Take 1 tablet (81 mg total) by mouth daily 12/11/20  Yes Vlad Santos MD   atorvastatin (LIPITOR) 40 mg tablet Take 1 tablet (40 mg total) by mouth daily  Patient taking differently: Take 40 mg by mouth daily at bedtime  12/11/20  Yes Vlad Santos MD   calcitriol (ROCALTROL) 0 25 mcg capsule TAKE 1 CAPSULE BY MOUTH EVERY OTHER DAY 1/20/21  Yes Marlys Negron MD   Cholecalciferol (VITAMIN D3) 1000 UNITS CAPS Take 1,000 unit marking on U-100 syringe by mouth daily  Yes Historical Provider, MD   citalopram (CeleXA) 20 mg tablet Take 20 mg by mouth daily  5/30/18  Yes Historical Provider, MD   Eliquis 2 5 MG TAKE 1 TABLET BY MOUTH TWICE A DAY 3/29/21  Yes Jennifer Acharya MD   levothyroxine 75 mcg tablet Take 75 mcg by mouth daily 2/11/18  Yes Historical Provider, MD   melatonin 3 mg Take 1 tablet (3 mg total) by mouth daily at bedtime 7/19/20  Yes Apurva Fischer DO   metoprolol tartrate (LOPRESSOR) 25 mg tablet Take 1 tablet (25 mg total) by mouth 2 (two) times a day 7/19/20  Yes Apurva Fischer DO   ruxolitinib (Jakafi) 10 mg tablet Take 1 tablet (10 mg total) by mouth daily 1/5/21  Yes Jennifer Acharya MD   saccharomyces boulardii (FLORASTOR) 250 mg capsule Take 1 capsule by mouth daily     Yes Historical Provider, MD   acetaminophen (TYLENOL) 325 mg tablet 650 mg every 6 hours as needed for mild pain or headache 1/14/19   Deep Phillips PA-C   loperamide (IMODIUM) 2 mg capsule Take 1 capsule (2 mg total) by mouth 4 (four) times a day as needed for diarrhea 7/14/20   Lewis Colon MD     I have reviewed home medications with patient personally  Allergies: Allergies   Allergen Reactions    Chlorhexidine Rash     petichi like rash when using chlorhexidine swabs prior to IV  Social History:     Marital Status:       Substance Use History:   Social History     Substance and Sexual Activity   Alcohol Use Not Currently    Frequency: Never    Binge frequency: Never    Comment: n/s     Social History     Tobacco Use   Smoking Status Never Smoker   Smokeless Tobacco Never Used   Tobacco Comment    n/a     Social History     Substance and Sexual Activity   Drug Use Not Currently    Comment: n/a       Family History:    Family History   Problem Relation Age of Onset    Cancer Mother         small cell cancer     Heart disease Father     COPD Father     Heart disease Brother     Nephrolithiasis Brother        Physical Exam:     Vitals:   Blood Pressure: 144/82 (05/08/21 1601)  Pulse: 65 (05/08/21 1601)  Temperature: 98 2 °F (36 8 °C) (05/08/21 1601)  Temp Source: Oral (05/08/21 1126)  Respirations: 17 (05/08/21 1126)  Height: 5' (152 4 cm) (05/08/21 1435)  Weight - Scale: 89 5 kg (197 lb 4 8 oz) (05/08/21 1435)  SpO2: 97 % (05/08/21 1601)    Physical Exam  HENT:      Head: Normocephalic and atraumatic  Nose: Nose normal       Mouth/Throat:      Mouth: Mucous membranes are moist    Eyes:      Extraocular Movements: Extraocular movements intact  Conjunctiva/sclera: Conjunctivae normal    Neck:      Musculoskeletal: Normal range of motion and neck supple  Cardiovascular:      Rate and Rhythm: Normal rate and regular rhythm  Pulmonary:      Effort: Pulmonary effort is normal       Breath sounds: Normal breath sounds  No wheezing or rales  Abdominal:      General: Bowel sounds are normal  There is no distension  Palpations: Abdomen is soft  Tenderness: There is no abdominal tenderness  Comments: Ileostomy with wine colored urine   Musculoskeletal: Normal range of motion  Right lower leg: No edema  Left lower leg: No edema  Skin:     General: Skin is warm and dry  Neurological:      Mental Status: She is alert and oriented to person, place, and time  Mental status is at baseline  Additional Data:     Lab Results: I have personally reviewed pertinent reports        Results from last 7 days   Lab Units 05/08/21  1219   WBC Thousand/uL 5 35   HEMOGLOBIN g/dL 8 7*   HEMATOCRIT % 27 8*   PLATELETS Thousands/uL 395*   NEUTROS PCT % 70   LYMPHS PCT % 16   MONOS PCT % 11   EOS PCT % 1     Results from last 7 days   Lab Units 05/08/21  1318 05/08/21  1219   SODIUM mmol/L  --  137   POTASSIUM mmol/L  --  5 0   CHLORIDE mmol/L  --  109*   CO2 mmol/L  --  19*   BUN mg/dL  --  42*   CREATININE mg/dL  --  3 68*   ANION GAP mmol/L  --  9   CALCIUM mg/dL  --  8 9   ALBUMIN g/dL 2 9*  --    TOTAL BILIRUBIN mg/dL 0 56  --    ALK PHOS U/L 85  --    ALT U/L 12  --    AST U/L 16  --    GLUCOSE RANDOM mg/dL  --  100     Results from last 7 days   Lab Units 05/08/21  1219   INR  1 70*                   Imaging: I have personally reviewed pertinent reports  CT abdomen pelvis wo contrast   Final Result by Liv aPrr MD (05/08 1332)      New left hydronephrosis with perinephric stranding suggests the possibility of more acute obstruction and/or pyelonephritis  The degree of dilatation on the left is more similar to a study from August 2020  There is also dilated ureter extending to    surgical sutures in the region of the ileal conduit suggesting possible stricture  No obvious stone is seen distally  Urologic evaluation is advised  Nonobstructing stones are seen in the right kidney  Diffuse dilatation of large and small bowel suggest ileus, more likely than obstruction, although the degree of small bowel dilatation is somewhat improved  Follow-up is suggested  Cyst is again noted in the right lower quadrant measuring up to 8 cm in size  This was noted as far back as 2015 although has slightly enlarged  Nonemergent focused ultrasound may be useful  The study was marked in [facility] for immediate notification  Workstation performed: [id number]         XR knee 4+ views left injury    (Results Pending)       EKG, Pathology, and Other Studies Reviewed on Admission:   · EKG: NSR    Allscripts / Epic Records Reviewed: Yes     ** Please Note: This note has been constructed using a voice recognition system   **

## 2021-05-08 NOTE — ASSESSMENT & PLAN NOTE
Lab Results   Component Value Date    EGFR 12 05/08/2021    EGFR 13 04/15/2021    EGFR 13 04/03/2021    CREATININE 3 68 (H) 05/08/2021    CREATININE 3 41 (H) 04/15/2021    CREATININE 3 39 (H) 04/03/2021   Estimated Creatinine Clearance: 13 4 mL/min (A) (by C-G formula based on SCr of 3 68 mg/dL (H))    · Monitor Cr  · Cr near baseline 3 4-3 5  · Gentle NS

## 2021-05-08 NOTE — ED PROVIDER NOTES
History  Chief Complaint   Patient presents with    Blood in Urine     Pt has urostomy and noticed blood in urine, started over a week ago, worse today  Keri Paez is a 45-year-old woman history significant for prior pulmonary embolisms, polycythemia vera, hypertension, ileal conduit, presenting with hematuria  Has been going on for 2 weeks, however worsened today  She reports gross blood in her urine, no clots  She is on Eliquis for previous pulmonary embolisms  This has happened several times before but has always self resolved  No foul-smelling urine  No fevers, chills, nausea, vomiting, chest pain, shortness of breath, abdominal pain, blood in stool, diarrhea, melena  No feces from ileal conduit site  She reports a fall 2 weeks ago  She was able to get up after the fall with assistance  She did not hit her head or lose consciousness  She had her left knee which now has a significant amount of ecchymosis  She has some pain on that knee but is able to bear weight on it and has a normal range of motion  Prior to Admission Medications   Prescriptions Last Dose Informant Patient Reported? Taking? Cholecalciferol (VITAMIN D3) 1000 UNITS CAPS 2021 at Unknown time Self Yes Yes   Sig: Take 1,000 unit marking on U-100 syringe by mouth daily     Eliquis 2 5 MG 2021 at Unknown time Self No Yes   Sig: TAKE 1 TABLET BY MOUTH TWICE A DAY   acetaminophen (TYLENOL) 325 mg tablet Unknown at Unknown time Self No No   Si mg every 6 hours as needed for mild pain or headache   aspirin (ECOTRIN LOW STRENGTH) 81 mg EC tablet 2021 at Unknown time Self No Yes   Sig: Take 1 tablet (81 mg total) by mouth daily   atorvastatin (LIPITOR) 40 mg tablet 2021 at Unknown time Self No Yes   Sig: Take 1 tablet (40 mg total) by mouth daily   Patient taking differently: Take 40 mg by mouth daily at bedtime    calcitriol (ROCALTROL) 0 25 mcg capsule 2021 at Unknown time Self No Yes   Sig: TAKE 1 CAPSULE BY MOUTH EVERY OTHER DAY   citalopram (CeleXA) 20 mg tablet 5/8/2021 at Unknown time Self Yes Yes   Sig: Take 20 mg by mouth daily    levothyroxine 75 mcg tablet 5/8/2021 at Unknown time Self Yes Yes   Sig: Take 75 mcg by mouth daily   loperamide (IMODIUM) 2 mg capsule Unknown at Unknown time Self No No   Sig: Take 1 capsule (2 mg total) by mouth 4 (four) times a day as needed for diarrhea   melatonin 3 mg 5/8/2021 at Unknown time Self No Yes   Sig: Take 1 tablet (3 mg total) by mouth daily at bedtime   metoprolol tartrate (LOPRESSOR) 25 mg tablet 5/8/2021 at Unknown time Self No Yes   Sig: Take 1 tablet (25 mg total) by mouth 2 (two) times a day   ruxolitinib (Jakafi) 10 mg tablet 5/8/2021 at Unknown time Self No Yes   Sig: Take 1 tablet (10 mg total) by mouth daily   saccharomyces boulardii (FLORASTOR) 250 mg capsule 5/8/2021 at Unknown time Self Yes Yes   Sig: Take 1 capsule by mouth daily        Facility-Administered Medications: None       Past Medical History:   Diagnosis Date    Anxiety     Bowel obstruction (HCC)     Chronic kidney disease (CKD), stage IV (severe) (HCC)     stage IV    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    Circulation problem     Compression fracture of cervical spine (HCC)     Hernia of abdominal cavity     History of kidney problems     Hydronephrosis     Hypertension     IBS (irritable bowel syndrome)     Incontinence     Lung mass     Improving on PET/CT 1/2016    [facility]mia vera ([facility] )     Pulmonary embolism ([facility] [address] ) 2014    Shingles     Urinary tract infection        Past Surgical History:   Procedure Laterality Date    ABDOMINAL ADHESION SURGERY N/A 8/9/2020    Procedure: LYSIS ADHESIONS;  Surgeon: Khushi Gee DO;  [facility];  Service: General    BLADDER SUSPENSION      BOTOX INJECTION N/A 7/27/2016    Procedure: BOTOX INJECTION ;  Surgeon: Denny Valencia MD;  [facility];  Service:    Susana Kim N/A     COLONOSCOPY      CYSTECTOMY, RADICAL WITH ILEOCONDUIT N/A 10/4/2016    Procedure: SUPRATRIGONAL CYSTECTOMY WITH ILEAL CONDUIT ;  Surgeon: Stephany Lovell MD;  [facility];  Service:    [employer] CYSTOSCOPY W/ RETROGRADES Bilateral 7/27/2016    Procedure: Jovitae Hard; RETROGRADE PYELOGRAM ;  Surgeon: Stephany Lovell MD;  [facility];  Service:     HERNIA REPAIR      IR AV FISTULAGRAM/GRAFTOGRAM  2/10/2021    IR NON-TUNNELED CENTRAL LINE PLACEMENT  7/17/2020    IR NON-TUNNELED CENTRAL LINE PLACEMENT  8/14/2020    LAPAROTOMY N/A 8/9/2020    Procedure: LAPAROTOMY EXPLORATORY, PARASTOMAL HERNIA REPAIR WITH MESH;  Surgeon: Ar Mcrae DO;  [facility];  Service: General    KY ANASTOMOSIS,AV,ANY SITE Right 1/5/2021    Procedure: Creation of right brachiobasilic fistula; Surgeon: Malick Chairez MD;  [facility];  Service: Vascular    [place] COLONOSCOPY FLX DX W/COLLJ SPEC WHEN PFRMD N/A 8/31/2016    Procedure: COLONOSCOPY;  Surgeon: Alejandra Saldana MD;  [facility]; Service: Gastroenterology    [place] CYSTOSCOPY,INSERT URETERAL STENT Bilateral 7/27/2016    Procedure: STENT INSERTION; EXCISION OF MESH ;  Surgeon: Stephany Lovell MD;  [facility];  Service: Urology    TONSILLECTOMY      TUBAL LIGATION      WISDOM TOOTH EXTRACTION         Family History   Problem Relation Age of Onset    Cancer Mother         small cell cancer     Heart disease Father     COPD Father     Heart disease Brother     Nephrolithiasis Brother      I have reviewed and agree with the history as documented      E-Cigarette/Vaping    E-Cigarette Use Never User      E-Cigarette/Vaping Substances    Nicotine No     THC No     CBD No     Flavoring No     Other No     Unknown No      Social History     Tobacco Use    Smoking status: Never Smoker    Smokeless tobacco: Never Used    Tobacco comment: n/a   Substance Use Topics    Alcohol use: Not Currently     Frequency: Never Binge frequency: Never     Comment: n/s    Drug use: Not Currently     Comment: n/a        Review of Systems   All other systems reviewed and are negative  Physical Exam  ED Triage Vitals   Temperature Pulse Respirations Blood Pressure SpO2   05/08/21 1126 05/08/21 1126 05/08/21 1126 05/08/21 1126 05/08/21 1126   98 2 °F (36 8 °C) 72 17 160/75 97 %      Temp Source Heart Rate Source Patient Position - Orthostatic VS BP Location FiO2 (%)   05/08/21 1126 05/08/21 1126 05/08/21 1126 05/08/21 1126 --   Oral Monitor Lying Left arm       Pain Score       05/08/21 1444       No Pain             Orthostatic Vital Signs  Vitals:    05/08/21 1126 05/08/21 1343 05/08/21 1434 05/08/21 1435   BP: 160/75 167/69 143/82 143/82   Pulse: 72 66  66   Patient Position - Orthostatic VS: Lying Sitting         Physical Exam  Vitals signs reviewed  Constitutional:       General: She is not in acute distress  Appearance: She is not toxic-appearing  HENT:      Head: Normocephalic and atraumatic  Nose: Nose normal    Cardiovascular:      Rate and Rhythm: Normal rate and regular rhythm  Pulses: Normal pulses  Pulmonary:      Effort: Pulmonary effort is normal       Breath sounds: Normal breath sounds  Abdominal:      General: There is no distension  Palpations: Abdomen is soft  Tenderness: There is no right CVA tenderness or left CVA tenderness  Comments: Ileoconduit site appears healthy, normal  Stoma pink  Mild LLQ tenderness on exam  No CVA tenderness bilaterally  Output is dark, red urine, particular matter present in urine  Musculoskeletal:         General: No deformity  Right lower leg: No edema  Left lower leg: No edema  Skin:     General: Skin is warm and dry  Coloration: Skin is not jaundiced or pale  Comments: Bruising over left knee  Mutiple bruises present on upper and lower extremities bilaterally  Neurological:      General: No focal deficit present  Mental Status: She is alert  ED Medications  Medications   atorvastatin (LIPITOR) tablet 40 mg (has no administration in time range)   calcitriol (ROCALTROL) capsule 0 25 mcg (0 25 mcg Oral Refused 5/8/21 1459)   cholecalciferol (VITAMIN D3) tablet 1,000 Units (has no administration in time range)   citalopram (CeleXA) tablet 20 mg (has no administration in time range)   levothyroxine tablet 75 mcg (has no administration in time range)   melatonin tablet 3 mg (has no administration in time range)   metoprolol tartrate (LOPRESSOR) tablet 25 mg (has no administration in time range)   saccharomyces boulardii (FLORASTOR) capsule 250 mg (has no administration in time range)   acetaminophen (TYLENOL) tablet 650 mg (has no administration in time range)   sodium chloride 0 9 % infusion (50 mL/hr Intravenous New Bag 5/8/21 1459)       Diagnostic Studies  Results Reviewed     Procedure Component Value Units Date/Time    Urine Microscopic [[id number]]  (Abnormal) Collected: 05/08/21 1338    Lab Status: Final result Specimen: Urine, Other Updated: 05/08/21 1458     RBC, UA Innumerable /hpf      WBC, UA Innumerable /hpf      Epithelial Cells Occasional /hpf      Bacteria, UA Innumerable /hpf     Urine culture [[id number]] Collected: 05/08/21 1338    Lab Status:  In process Specimen: Urine, Other Updated: 05/08/21 1458    UA w Reflex to Microscopic w Reflex to Culture [[id number]]  (Abnormal) Collected: 05/08/21 1338    Lab Status: Final result Specimen: Urine, Other Updated: 05/08/21 1458     Color, UA Brown     Clarity, UA Turbid     Specific [place], UA 1 013     pH, UA Interference-unable to analyze     Leukocytes, UA Interference- unable to analyze     Nitrite, UA Interference- unable to analyze     Protein, UA       Interference- unable to analyze     mg/dl     Glucose, UA       Interference- unable to analyze     mg/dl     Ketones, UA       Interference- unable to analyze     mg/dl     Urobilinogen, UA Interference-unable to analyze     E U /dl     Bilirubin, UA Interference- unable to analyze     Blood, UA Interference- unable to analyze    Troponin I [[id number]]  (Normal) Collected: 05/08/21 1318    Lab Status: Final result Specimen: Blood from Arm, Left Updated: 05/08/21 1345     Troponin I <5 33 ng/mL     Salicylate level [[id number]]  (Abnormal) Collected: 05/08/21 1318    Lab Status: Final result Specimen: Blood from Arm, Left Updated: 30/49/23 7969     Salicylate Lvl <3 mg/dL     Hepatic function panel [[id number]]  (Abnormal) Collected: 05/08/21 1318    Lab Status: Final result Specimen: Blood from Arm, Left Updated: 05/08/21 1342     Total Bilirubin 0 56 mg/dL      Bilirubin, Direct 0 17 mg/dL      Alkaline Phosphatase 85 U/L      AST 16 U/L      ALT 12 U/L      Total Protein 6 2 g/dL      Albumin 2 9 g/dL     Acetaminophen level-"If concentration is detectable, please discuss with medical  on call " [[id number]]  (Abnormal) Collected: 05/08/21 1318    Lab Status: Final result Specimen: Blood from Arm, Left Updated: 05/08/21 1342     Acetaminophen Level <2 ug/mL     Protime-INR [[id number]]  (Abnormal) Collected: 05/08/21 1219    Lab Status: Final result Specimen: Blood from Arm, Left Updated: 05/08/21 1259     Protime 19 9 seconds      INR 1 70    APTT [[id number]]  (Normal) Collected: 05/08/21 1219    Lab Status: Final result Specimen: Blood from Arm, Left Updated: 05/08/21 1259     PTT 34 seconds     Basic metabolic panel [[id number]]  (Abnormal) Collected: 05/08/21 1219    Lab Status: Final result Specimen: Blood from Arm, Left Updated: 05/08/21 1257     Sodium 137 mmol/L      Potassium 5 0 mmol/L      Chloride 109 mmol/L      CO2 19 mmol/L      ANION GAP 9 mmol/L      BUN 42 mg/dL      Creatinine 3 68 mg/dL      Glucose 100 mg/dL      Calcium 8 9 mg/dL      eGFR 12 ml/min/1 73sq m     Narrative:      [place] guidelines for Chronic Kidney Disease (CKD):     Stage 1 with normal or high GFR (GFR > 90 mL/min/1 73 square meters)    Stage 2 Mild CKD (GFR = 60-89 mL/min/1 73 square meters)    Stage 3A Moderate CKD (GFR = 45-59 mL/min/1 73 square meters)    Stage 3B Moderate CKD (GFR = 30-44 mL/min/1 73 square meters)    Stage 4 Severe CKD (GFR = 15-29 mL/min/1 73 square meters)    Stage 5 End Stage CKD (GFR <15 mL/min/1 73 square meters)  Note: GFR calculation is accurate only with a steady state creatinine    CK Total with Reflex CKMB [[id number]]  (Normal) Collected: 05/08/21 1219    Lab Status: Final result Specimen: Blood from Arm, Left Updated: 05/08/21 1257     Total CK 91 U/L     CBC and differential [[id number]]  (Abnormal) Collected: 05/08/21 1219    Lab Status: Final result Specimen: Blood from Arm, Left Updated: 05/08/21 1234     WBC 5 35 Thousand/uL      RBC 2 93 Million/uL      Hemoglobin 8 7 g/dL      Hematocrit 27 8 %      MCV 95 fL      MCH 29 7 pg      MCHC 31 3 g/dL      RDW 13 8 %      MPV 9 8 fL      Platelets 779 Thousands/uL      nRBC 0 /100 WBCs      Neutrophils Relative 70 %      Immat GRANS % 1 %      Lymphocytes Relative 16 %      Monocytes Relative 11 %      Eosinophils Relative 1 %      Basophils Relative 1 %      Neutrophils Absolute 3 84 Thousands/µL      Immature Grans Absolute 0 06 Thousand/uL      Lymphocytes Absolute 0 83 Thousands/µL      Monocytes Absolute 0 56 Thousand/µL      Eosinophils Absolute 0 03 Thousand/µL      Basophils Absolute 0 03 Thousands/µL                  CT abdomen pelvis wo contrast   Final Result by Betsy Maxwell MD (05/08 1332)      New left hydronephrosis with perinephric stranding suggests the possibility of more acute obstruction and/or pyelonephritis  The degree of dilatation on the left is more similar to a study from August 2020  There is also dilated ureter extending to    surgical sutures in the region of the ileal conduit suggesting possible stricture  No obvious stone is seen distally    Urologic evaluation is advised  Nonobstructing stones are seen in the right kidney  Diffuse dilatation of large and small bowel suggest ileus, more likely than obstruction, although the degree of small bowel dilatation is somewhat improved  Follow-up is suggested  Cyst is again noted in the right lower quadrant measuring up to 8 cm in size  This was noted as far back as 2015 although has slightly enlarged  Nonemergent focused ultrasound may be useful  The study was [facility] in [facility] for immediate notification  Workstation performed: [id number]         XR knee 4+ views left injury    (Results Pending)         Procedures  ECG 12 Lead Documentation Only    Date/Time: 5/8/2021 1:25 PM  Performed by: Litzy Thacker MD  Authorized by: Litzy Thacker MD     Indications / Diagnosis:  Weakness  ECG reviewed by me, the ED Provider: yes    Patient location:  ED  Previous ECG:     Previous ECG:  Unavailable  Interpretation:     Interpretation: normal    Rate:     ECG rate:  68    ECG rate assessment: normal    Rhythm:     Rhythm: sinus rhythm    Ectopy:     Ectopy: none    QRS:     QRS axis:  Normal  Conduction:     Conduction: normal    ST segments:     ST segments:  Normal  T waves:     T waves: normal            ED Course  ED Course as of May 08 1528   Sat May 08, 2021   1242 9 1 on 4/15   Hemoglobin(!): 8 7               Identification of Seniors at Risk      Most Recent Value   (ISAR) Identification of Seniors at Risk   Before the illness or injury that brought you to the Emergency, did you need someone to help you on a regular basis? 0 Filed at: 05/08/2021 1145   In the last 24 hours, have you needed more help than usual?  0 Filed at: 05/08/2021 1145   Have you been hospitalized for one or more nights during the past 6 months? 1 Filed at: 05/08/2021 1145   In general, do you see well?  0 Filed at: 05/08/2021 1145   In general, do you have serious problems with your memory?   0 Filed at: 05/08/2021 1145   Do you take more than three different medications every day? 1 Filed at: 05/08/2021 1145   ISAR Score  2 Filed at: 05/08/2021 1145                              MDM  Number of Diagnoses or Management Options  Hematuria:   Hydronephrosis:   Diagnosis management comments: 76 yo woman with gross hematuria  Concerning for complication of Eliquus, bladder carcinoma, UTI, post-surgical complication  LLQ abdominal tenderness concerning for diverticulitis  Left knee pain and ecchymosis concerning for fracture  Most concerning for complication of Eliquus  Will evaluate with BMP, CBC, coags, CT A/P, x-ray left knee  Left knee x-ray shows no fracture  CBC shows no acute drop in hemoglobin  CT A/P shows new left hydronephrosis, ureteral dilation, concern for stricture  Spoke with Brian Munoz from urology, plans for patient to go to IR for percutaneous nephrostomy at some point  No acute intervention from ED  Patient admitted to [facility] for further observation and management  Disposition  Final diagnoses:   Hematuria   Hydronephrosis     Time reflects when diagnosis was documented in both MDM as applicable and the Disposition within this note     Time User Action Codes Description Comment    5/8/2021  1:50 PM Kathlynn Cobble Add [R31 9] Hematuria     5/8/2021  1:50 PM Kathlynn Cobble Add [N13 30] Hydronephrosis       ED Disposition     ED Disposition Condition Date/Time Comment    Admit Stable Sat May 8, 2021  1:50 PM Case was discussed with GERRI and the patient's admission status was agreed to be Admission Status: inpatient status to the service of Dr Wharton            Follow-up Information    None         Current Discharge Medication List      CONTINUE these medications which have NOT CHANGED    Details   aspirin (ECOTRIN LOW STRENGTH) 81 mg EC tablet Take 1 tablet (81 mg total) by mouth daily  Qty: 90 tablet, Refills: 4    Associated Diagnoses: Hyperlipidemia, unspecified hyperlipidemia type      atorvastatin (LIPITOR) 40 mg tablet Take 1 tablet (40 mg total) by mouth daily  Qty: 90 tablet, Refills: 6    Associated Diagnoses: Hyperlipidemia, unspecified hyperlipidemia type      calcitriol (ROCALTROL) 0 25 mcg capsule TAKE 1 CAPSULE BY MOUTH EVERY OTHER DAY  Qty: 15 capsule, Refills: 5    Associated Diagnoses: Secondary hyperparathyroidism of renal origin (HCC)      Cholecalciferol (VITAMIN D3) 1000 UNITS CAPS Take 1,000 unit marking on U-100 syringe by mouth daily  citalopram (CeleXA) 20 mg tablet Take 20 mg by mouth daily       Eliquis 2 5 MG TAKE 1 TABLET BY MOUTH TWICE A DAY  Qty: 60 tablet, Refills: 5    Associated Diagnoses: Polycythemia vera ([address] ); 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)      levothyroxine 75 mcg tablet Take 75 mcg by mouth daily  Refills: 3      melatonin 3 mg Take 1 tablet (3 mg total) by mouth daily at bedtime  Qty: 30 tablet, Refills: 0    Associated Diagnoses: Toxic metabolic encephalopathy      metoprolol tartrate (LOPRESSOR) 25 mg tablet Take 1 tablet (25 mg total) by mouth 2 (two) times a day  Qty: 60 tablet, Refills: 0    Comments: DX Code Needed    Associated Diagnoses: CKD (chronic kidney disease), stage IV (HCC)      ruxolitinib (Jakafi) 10 mg tablet Take 1 tablet (10 mg total) by mouth daily  Qty: 30 tablet, Refills: 3    Associated Diagnoses: Polycythemia vera (HCC)      saccharomyces boulardii (FLORASTOR) 250 mg capsule Take 1 capsule by mouth daily        acetaminophen (TYLENOL) 325 mg tablet 650 mg every 6 hours as needed for mild pain or headache  Qty: 30 tablet, Refills: 0    Associated Diagnoses: Subdural hematoma, acute (HCC)      loperamide (IMODIUM) 2 mg capsule Take 1 capsule (2 mg total) by mouth 4 (four) times a day as needed for diarrhea  Qty: 12 capsule, Refills: 0    Associated Diagnoses: Diarrhea           No discharge procedures on file      PDMP Review       Value Time User    PDMP Reviewed  Yes 1/5/2021 12:26 Juanita Chairez MD           ED Provider  Attending physically available and evaluated Jorge Alberto Ser  I managed the patient along with the ED Attending      Electronically Signed by         Colton Luque MD  05/08/21 6732

## 2021-05-08 NOTE — ED ATTENDING ATTESTATION
5/8/2021  Virgin Rubinstein Nappe, DO, saw and evaluated the patient  I have discussed the patient with the resident/non-physician practitioner and agree with the resident's/non-physician practitioner's findings, Plan of Care, and MDM as documented in the resident's/non-physician practitioner's note, except where noted  All available labs and Radiology studies were reviewed  I was present for key portions of any procedure(s) performed by the resident/non-physician practitioner and I was immediately available to provide assistance  At this point I agree with the current assessment done in the Emergency Department  I have conducted an independent evaluation of this patient a history and physical is as follows:    77 yof with blood in urine for two weeks, worsening  Urinary output from an ileal conduit apparatus  +thinners       Past Medical History:   Diagnosis Date    Anxiety     Bowel obstruction (HCC)     Chronic kidney disease (CKD), stage IV (severe) (HCC)     stage IV    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    Circulation problem     Compression fracture of cervical spine (HCC)     Hernia of abdominal cavity     History of kidney problems     Hydronephrosis     Hypertension     IBS (irritable bowel syndrome)     Incontinence     Lung mass     Improving on PET/CT 1/2016    Polycythemia vera ([address] )     Pulmonary embolism ([facility] [address] ) 2014    Shingles     Urinary tract infection        Past Surgical History:   Procedure Laterality Date    ABDOMINAL ADHESION SURGERY N/A 8/9/2020    Procedure: LYSIS ADHESIONS;  Surgeon: Lorraine Ansari DO;  [facility];  Service: General    BLADDER SUSPENSION      BOTOX INJECTION N/A 7/27/2016    Procedure: BOTOX INJECTION ;  Surgeon: Candance Netter, MD;  [facility];  Service:     CHOLECYSTECTOMY N/A     COLONOSCOPY      CYSTECTOMY, RADICAL WITH ILEOCONDUIT N/A 10/4/2016    Procedure: SUPRATRIGONAL CYSTECTOMY WITH ILEAL CONDUIT ;  Surgeon: Ami Fothergill, MD;  [facility];  Service:    Maribell Quitter W/ RETROGRADES Bilateral 7/27/2016    Procedure: Martell Simeon; RETROGRADE PYELOGRAM ;  Surgeon: Ami Fothergill, MD;  [facility];  Service:     HERNIA REPAIR      IR AV FISTULAGRAM/GRAFTOGRAM  2/10/2021    IR NON-TUNNELED CENTRAL LINE PLACEMENT  7/17/2020    IR NON-TUNNELED CENTRAL LINE PLACEMENT  8/14/2020    LAPAROTOMY N/A 8/9/2020    Procedure: LAPAROTOMY EXPLORATORY, PARASTOMAL HERNIA REPAIR WITH MESH;  Surgeon: Jil Soto DO;  [facility];  Service: General    MI ANASTOMOSIS,AV,ANY SITE Right 1/5/2021    Procedure: Creation of right brachiobasilic fistula; Surgeon: Vinny Chairez MD;  [facility];  Service: Vascular    MI COLONOSCOPY FLX DX W/COLLJ SPEC WHEN PFRMD N/A 8/31/2016    Procedure: COLONOSCOPY;  Surgeon: Zuleika Chatman MD;  [facility]; Service: Gastroenterology    MI CYSTOSCOPY,INSERT URETERAL STENT Bilateral 7/27/2016    Procedure: STENT INSERTION; EXCISION OF MESH ;  Surgeon: Ami Fothergill, MD;  [facility];  Service: Urology    TONSILLECTOMY      TUBAL LIGATION      WISDOM TOOTH EXTRACTION           /75 (BP Location: Left arm)   Pulse 72   Temp 98 2 °F (36 8 °C) (Oral)   Resp 17   SpO2 97%   NAD, A&Ox3, no resp distress, RRR, abd soft/NT, ext NT    CT AP, labs, concerned for acute blood loss, symptomatic anemia, fall risk w need to further evaluate source of bleeding  Likely admit for obs      XR L knee re recent fall        ED Course         Critical Care Time  Procedures

## 2021-05-08 NOTE — ASSESSMENT & PLAN NOTE
· Low dose Eliquis held  · Consider HSQ if Hgb stable while awaiting perc neph - would obviously need to hold day of procedure

## 2021-05-08 NOTE — ASSESSMENT & PLAN NOTE
· IR and urology consulted  · Plan for perc neph possibly Monday or Tuesday when off Eliquis and ASA

## 2021-05-08 NOTE — PLAN OF CARE
Problem: Potential for Falls  Goal: Patient will remain free of falls  Description: INTERVENTIONS:  - Assess patient frequently for physical needs  -  Identify cognitive and physical deficits and behaviors that affect risk of falls    -  [place] fall precautions as indicated by assessment   - Educate patient/family on patient safety including physical limitations  - Instruct patient to call for assistance with activity based on assessment  - Modify environment to reduce risk of injury  - Consider OT/PT consult to assist with strengthening/mobility  Outcome: Progressing     Problem: PAIN - ADULT  Goal: Verbalizes/displays adequate comfort level or baseline comfort level  Description: Interventions:  - Encourage patient to monitor pain and request assistance  - Assess pain using appropriate pain scale  - Administer analgesics based on type and severity of pain and evaluate response  - Implement non-pharmacological measures as appropriate and evaluate response  - Consider cultural and social influences on pain and pain management  - Notify physician/advanced practitioner if interventions unsuccessful or patient reports new pain  Outcome: Progressing     Problem: INFECTION - ADULT  Goal: Absence or prevention of progression during hospitalization  Description: INTERVENTIONS:  - Assess and monitor for signs and symptoms of infection  - Monitor lab/diagnostic results  - Monitor all insertion sites, i e  indwelling lines, tubes, and drains  - Monitor endotracheal if appropriate and nasal secretions for changes in amount and color  - [place] appropriate cooling/warming therapies per order  - Administer medications as ordered  - Instruct and encourage patient and family to use good hand hygiene technique  - Identify and instruct in appropriate isolation precautions for identified infection/condition  Outcome: Progressing  Goal: Absence of fever/infection during neutropenic period  Description: INTERVENTIONS:  - Monitor WBC    Outcome: Progressing     Problem: SAFETY ADULT  Goal: Patient will remain free of falls  Description: INTERVENTIONS:  - Assess patient frequently for physical needs  -  Identify cognitive and physical deficits and behaviors that affect risk of falls    -  [place] fall precautions as indicated by assessment   - Educate patient/family on patient safety including physical limitations  - Instruct patient to call for assistance with activity based on assessment  - Modify environment to reduce risk of injury  - Consider OT/PT consult to assist with strengthening/mobility  Outcome: Progressing  Goal: Maintain or return to baseline ADL function  Description: INTERVENTIONS:  -  Assess patient's ability to carry out ADLs; assess patient's baseline for ADL function and identify physical deficits which impact ability to perform ADLs (bathing, care of mouth/teeth, toileting, grooming, dressing, etc )  - Assess/evaluate cause of self-care deficits   - Assess range of motion  - Assess patient's mobility; develop plan if impaired  - Assess patient's need for assistive devices and provide as appropriate  - Encourage maximum independence but intervene and supervise when necessary  - Involve family in performance of ADLs  - Assess for home care needs following discharge   - Consider OT consult to assist with ADL evaluation and planning for discharge  - Provide patient education as appropriate  Outcome: Progressing  Goal: Maintain or return mobility status to optimal level  Description: INTERVENTIONS:  - Assess patient's baseline mobility status (ambulation, transfers, stairs, etc )    - Identify cognitive and physical deficits and behaviors that affect mobility  - Identify mobility aids required to assist with transfers and/or ambulation (gait belt, sit-to-stand, lift, walker, cane, etc )  - [place] fall precautions as indicated by assessment  - Record patient progress and toleration of activity level on Mobility SBAR; progress patient to next Phase/Stage  - Instruct patient to call for assistance with activity based on assessment  - Consider rehabilitation consult to assist with strengthening/weightbearing, etc   Outcome: Progressing     Problem: DISCHARGE PLANNING  Goal: Discharge to home or other facility with appropriate resources  Description: INTERVENTIONS:  - Identify barriers to discharge w/patient and caregiver  - Arrange for needed discharge resources and transportation as appropriate  - Identify discharge learning needs (meds, wound care, etc )  - Arrange for interpretive services to assist at discharge as needed  - Refer to Case Management Department for coordinating discharge planning if the patient needs post-hospital services based on physician/advanced practitioner order or complex needs related to functional status, cognitive ability, or social support system  Outcome: Progressing     Problem: Knowledge Deficit  Goal: Patient/family/caregiver demonstrates understanding of disease process, treatment plan, medications, and discharge instructions  Description: Complete learning assessment and assess knowledge base    Interventions:  - Provide teaching at level of understanding  - Provide teaching via preferred learning methods  Outcome: Progressing

## 2021-05-09 LAB
ANION GAP SERPL CALCULATED.3IONS-SCNC: 6 MMOL/L (ref 4–13)
ATRIAL RATE: 68 BPM
BACTERIA UR CULT: NORMAL
BUN SERPL-MCNC: 43 MG/DL (ref 5–25)
CALCIUM SERPL-MCNC: 8.3 MG/DL (ref 8.3–10.1)
CHLORIDE SERPL-SCNC: 110 MMOL/L (ref 100–108)
CO2 SERPL-SCNC: 21 MMOL/L (ref 21–32)
CREAT SERPL-MCNC: 3.57 MG/DL (ref 0.6–1.3)
ERYTHROCYTE [DISTWIDTH] IN BLOOD BY AUTOMATED COUNT: 13.8 % (ref 11.6–15.1)
GFR SERPL CREATININE-BSD FRML MDRD: 12 ML/MIN/1.73SQ M
GLUCOSE SERPL-MCNC: 91 MG/DL (ref 65–140)
HCT VFR BLD AUTO: 25.8 % (ref 34.8–46.1)
HGB BLD-MCNC: 8 G/DL (ref 11.5–15.4)
MAGNESIUM SERPL-MCNC: 2 MG/DL (ref 1.6–2.6)
MCH RBC QN AUTO: 29.5 PG (ref 26.8–34.3)
MCHC RBC AUTO-ENTMCNC: 31 G/DL (ref 31.4–37.4)
MCV RBC AUTO: 95 FL (ref 82–98)
P AXIS: 74 DEGREES
PHOSPHATE SERPL-MCNC: 3.4 MG/DL (ref 2.3–4.1)
PLATELET # BLD AUTO: 344 THOUSANDS/UL (ref 149–390)
PMV BLD AUTO: 9.7 FL (ref 8.9–12.7)
POTASSIUM SERPL-SCNC: 4.4 MMOL/L (ref 3.5–5.3)
PR INTERVAL: 148 MS
QRS AXIS: 0 DEGREES
QRSD INTERVAL: 80 MS
QT INTERVAL: 394 MS
QTC INTERVAL: 418 MS
RBC # BLD AUTO: 2.71 MILLION/UL (ref 3.81–5.12)
SODIUM SERPL-SCNC: 137 MMOL/L (ref 136–145)
T WAVE AXIS: 44 DEGREES
VENTRICULAR RATE: 68 BPM
WBC # BLD AUTO: 4.7 THOUSAND/UL (ref 4.31–10.16)

## 2021-05-09 PROCEDURE — 80048 BASIC METABOLIC PNL TOTAL CA: CPT | Performed by: GENERAL PRACTICE

## 2021-05-09 PROCEDURE — 85027 COMPLETE CBC AUTOMATED: CPT | Performed by: GENERAL PRACTICE

## 2021-05-09 PROCEDURE — 84100 ASSAY OF PHOSPHORUS: CPT | Performed by: GENERAL PRACTICE

## 2021-05-09 PROCEDURE — NC001 PR NO CHARGE: Performed by: RADIOLOGY

## 2021-05-09 PROCEDURE — 83735 ASSAY OF MAGNESIUM: CPT | Performed by: GENERAL PRACTICE

## 2021-05-09 PROCEDURE — 99223 1ST HOSP IP/OBS HIGH 75: CPT | Performed by: UROLOGY

## 2021-05-09 PROCEDURE — 93010 ELECTROCARDIOGRAM REPORT: CPT | Performed by: INTERNAL MEDICINE

## 2021-05-09 PROCEDURE — 99233 SBSQ HOSP IP/OBS HIGH 50: CPT | Performed by: FAMILY MEDICINE

## 2021-05-09 RX ORDER — SODIUM CHLORIDE 9 MG/ML
50 INJECTION, SOLUTION INTRAVENOUS CONTINUOUS
Status: DISPENSED | OUTPATIENT
Start: 2021-05-10 | End: 2021-05-10

## 2021-05-09 RX ORDER — HEPARIN SODIUM 5000 [USP'U]/ML
5000 INJECTION, SOLUTION INTRAVENOUS; SUBCUTANEOUS EVERY 8 HOURS SCHEDULED
Status: DISCONTINUED | OUTPATIENT
Start: 2021-05-09 | End: 2021-05-09

## 2021-05-09 RX ADMIN — Medication 250 MG: at 08:49

## 2021-05-09 RX ADMIN — METOPROLOL TARTRATE 25 MG: 25 TABLET, FILM COATED ORAL at 17:37

## 2021-05-09 RX ADMIN — METOPROLOL TARTRATE 25 MG: 25 TABLET, FILM COATED ORAL at 08:49

## 2021-05-09 RX ADMIN — SODIUM CHLORIDE 50 ML/HR: 0.9 INJECTION, SOLUTION INTRAVENOUS at 05:47

## 2021-05-09 RX ADMIN — LEVOTHYROXINE SODIUM 75 MCG: 75 TABLET ORAL at 05:44

## 2021-05-09 RX ADMIN — Medication 1000 UNITS: at 08:49

## 2021-05-09 RX ADMIN — ATORVASTATIN CALCIUM 40 MG: 40 TABLET, FILM COATED ORAL at 17:37

## 2021-05-09 RX ADMIN — RUXOLITINIB 10 MG: 10 TABLET ORAL at 10:43

## 2021-05-09 RX ADMIN — SODIUM CHLORIDE 50 ML/HR: 0.9 INJECTION, SOLUTION INTRAVENOUS at 23:21

## 2021-05-09 RX ADMIN — CITALOPRAM HYDROBROMIDE 20 MG: 20 TABLET ORAL at 08:49

## 2021-05-09 RX ADMIN — HEPARIN SODIUM 5000 UNITS: 5000 INJECTION INTRAVENOUS; SUBCUTANEOUS at 09:28

## 2021-05-09 RX ADMIN — MELATONIN TAB 3 MG 3 MG: 3 TAB at 21:14

## 2021-05-09 NOTE — ASSESSMENT & PLAN NOTE
Patient follows up with outpatient Hematology Oncology, currently on Jakafi 10 mg Oral Daily  Patient brought this medication from home, will order

## 2021-05-09 NOTE — PROGRESS NOTES
[facility]  Progress Note - Jose Parson [birth year], 77 y o  female MRN: [id number]  Unit/Bed#: [facility] 803-01 Encounter: [id number]  Primary Care Provider: Chris Cason MD   Date and time admitted to hospital: 5/8/2021 11:18 AM    * Gross hematuria  Assessment & Plan  · Patient presented with gross hematuria  · CT shows: New left hydronephrosis with perinephric stranding suggests the possibility of more acute obstruction and/or pyelonephritis  · Urology consulted and appreciated input  · IR has been consulted, plan is for nephrostomy on Monday  · Monitor H/H  · Baseline hgb is 9-10  · Presented with hgb 8  · Blood consent signed in case Hgb < 7  · F/u urine culture  · No s/s of infection, afebrile, no leukocytosis, therefore hold abx    HER LAST DAY OF ASPIRIN WAS Friday 5/7/2021    Hydronephrosis of left kidney  Assessment & Plan  · IR and urology consulted  · Plan for perc neph possibly Monday or Tuesday when off Eliquis and ASA    Hyperlipemia  Assessment & Plan  · Statin  · If Eliquis to be restarted at d/c, unsure of benefit of ASA    Stage 5 chronic kidney disease not on chronic dialysis [facility])  Assessment & Plan  Lab Results   Component Value Date    EGFR 12 05/09/2021    EGFR 12 05/08/2021    EGFR 13 04/15/2021    CREATININE 3 57 (H) 05/09/2021    CREATININE 3 68 (H) 05/08/2021    CREATININE 3 41 (H) 04/15/2021   Estimated Creatinine Clearance: 13 8 mL/min (A) (by C-G formula based on SCr of 3 57 mg/dL (H))    · Monitor Cr  · Cr near baseline 3 4-3 5  · Gentle NS  · Likely will improve with nephrostomy tube    Polycythemia vera [facility])  Assessment & Plan   Patient follows up with outpatient Hematology Oncology, currently on Jakafi 10 mg Oral Daily  Patient brought this medication from home, will order    Obstructive uropathy  Assessment & Plan  · S/p ileostomy for nonfunctioning bladder and chronic hydro  · Urology consult    Chronic saddle pulmonary embolism [facility])  Assessment & Plan  · Low dose Eliquis held due to ongoing hematuria  · Continues to have gross hematuria, will hold DVT prophylaxis  · Consider HSQ if Hgb stable while awaiting perc neph - would obviously need to hold day of procedure      VTE Pharmacologic Prophylaxis:   Pharmacologic: Pharmacologic VTE Prophylaxis contraindicated due to ongoing hematuria, decreasing hemoglobin  Mechanical VTE Prophylaxis in Place: Yes    Patient Centered Rounds: I have performed bedside rounds with nursing staff today  Current Length of Stay: 1 day(s)    Current Patient Status: Inpatient   Certification Statement: The patient will continue to require additional inpatient hospital stay due to hematuria, nephrostomy tube tomorrow    Discharge Plan: pending hospital course    Code Status: Level 1 - Full Code      Subjective:   Patient examined at bedside  She continues to have hematuria  Dark urine noted in the bag  Objective:     Vitals:   Temp (24hrs), Av 8 °F (37 1 °C), Min:98 2 °F (36 8 °C), Max:99 9 °F (37 7 °C)    Temp:  [98 2 °F (36 8 °C)-99 9 °F (37 7 °C)] 99 4 °F (37 4 °C)  HR:  [65-71] 71  Resp:  [16-18] 18  BP: (116-152)/(74-85) 116/74  SpO2:  [92 %-97 %] 92 %  Body mass index is 38 53 kg/m²  Input and Output Summary (last 24 hours): Intake/Output Summary (Last 24 hours) at 2021 1428  Last data filed at 2021 1418  Gross per 24 hour   Intake 740 ml   Output 1675 ml   Net -935 ml       Physical Exam:     Physical Exam  Constitutional:       Appearance: She is well-developed  HENT:      Head: Normocephalic and atraumatic  Neck:      Musculoskeletal: Normal range of motion  Pulmonary:      Effort: Pulmonary effort is normal  No respiratory distress  Breath sounds: Normal breath sounds  Skin:     General: Skin is warm and dry              Results from last 7 days   Lab Units 21  0528  21  1219   WBC Thousand/uL 4 70  --  5 35   HEMOGLOBIN g/dL 8 0*   < > 8 7* HEMATOCRIT % 25 8*   < > 27 8*   PLATELETS Thousands/uL 344  --  395*   NEUTROS PCT %  --   --  70   LYMPHS PCT %  --   --  16   MONOS PCT %  --   --  11   EOS PCT %  --   --  1    < > = values in this interval not displayed  Results from last 7 days   Lab Units 05/09/21  0528 05/08/21  1318   SODIUM mmol/L 137  --    POTASSIUM mmol/L 4 4  --    CHLORIDE mmol/L 110*  --    CO2 mmol/L 21  --    BUN mg/dL 43*  --    CREATININE mg/dL 3 57*  --    ANION GAP mmol/L 6  --    CALCIUM mg/dL 8 3  --    ALBUMIN g/dL  --  2 9*   TOTAL BILIRUBIN mg/dL  --  0 56   ALK PHOS U/L  --  85   ALT U/L  --  12   AST U/L  --  16   GLUCOSE RANDOM mg/dL 91  --      Results from last 7 days   Lab Units 05/08/21  1219   INR  1 70*                        Recent Cultures (last 7 days):        Last 24 Hours Medication List:   Current Facility-Administered Medications   Medication Dose Route Frequency Provider Last Rate    acetaminophen  650 mg Oral Q6H PRN Colonel Ang, DO      atorvastatin  40 mg Oral Daily With SIPX, DO      calcitriol  0 25 mcg Oral Every Other Day Colonel Ang, DO      cholecalciferol  1,000 Units Oral Daily Colonel Ang, DO      citalopram  20 mg Oral Daily Colonel Ang, DO      levothyroxine  75 mcg Oral Early Morning Colonel Ang, DO      melatonin  3 mg Oral HS Colonel Ang, DO      metoprolol tartrate  25 mg Oral BID Colonel Ang, DO      ruxolitinib  10 mg Oral Daily Avelina Musa MD      saccharomyces boulardii  250 mg Oral Daily Colonel Ang, DO      sodium chloride  50 mL/hr Intravenous Continuous Colonel Ang, DO 50 mL/hr (05/09/21 0547)        Today, Patient Was Seen By: Avelina Musa MD    ** Please Note: Dictation voice to text software may have been used in the creation of this document   **

## 2021-05-09 NOTE — ASSESSMENT & PLAN NOTE
· Low dose Eliquis held due to ongoing hematuria  · Continues to have gross hematuria, will hold DVT prophylaxis  · Consider HSQ if Hgb stable while awaiting perc neph - would obviously need to hold day of procedure

## 2021-05-09 NOTE — H&P (VIEW-ONLY)
UROLOGY CONSULTATION NOTE     Patient Identifiers: Davis Valiente (MRN [id number])  Service Requesting Consultation:  Medicine  Service Providing Consultation:  Urology, Malu Michael MD    Date of Service: 5/9/2021  Inpatient consult to Urology  Consult performed by: Malu Michael MD  Consult ordered by: Ozzie Saba DO          Reason for Consultation:  Gross hematuria, left hydronephrosis    History of Present Illness:     Davis Valiente is a 77 y o  old with a history of end-stage bladder, status post super trigonal cystectomy for palliation  This was performed in conjunction with ileal conduit urinary diversion by Dr Tayo Yepez in 2016  Indication for this procedure include renal failure due to elevated bladder pressures  She has since developed a parastomal hernia with obstruction requiring surgical repair in 2019  She reported to the emergency department due to finding of gross hematuria  She denies any pain associated with this  She does take Eliquis and aspirin  No nausea, vomiting, fever, chills  CT scan performed on evaluation reveals worsening left hydronephrosis as well as perinephric stranding indicating more acute obstruction and/or pyelonephritis  This appears potentially due to an anastomotic stricture at surgical site  Her baseline creatinine is apparently between 2 5 and 3  Current creatinine 3 68      Past Medical, Past Surgical History:     Past Medical History:   Diagnosis Date    Anxiety     Bowel obstruction (HCC)     Chronic kidney disease (CKD), stage IV (severe) (HCC)     stage IV    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    Circulation problem     Compression fracture of cervical spine (HCC)     Hernia of abdominal cavity     History of kidney problems     Hydronephrosis     Hypertension     IBS (irritable bowel syndrome)     Incontinence     Lung mass     Improving on PET/CT 1/2016    Polycythemia vera ([facility] [address] )     Pulmonary embolism [facility]) 2014    Shingles     Urinary tract infection    :    Past Surgical History:   Procedure Laterality Date    ABDOMINAL ADHESION SURGERY N/A 8/9/2020    Procedure: LYSIS ADHESIONS;  Surgeon: Venancio John DO;  [facility];  Service: General    BLADDER SUSPENSION      BOTOX INJECTION N/A 7/27/2016    Procedure: BOTOX INJECTION ;  Surgeon: Jojo Mcintyre MD;  [facility];  Service:    [address], RADICAL WITH ILEOCONDUIT N/A 10/4/2016    Procedure: SUPRATRIGONAL CYSTECTOMY WITH ILEAL CONDUIT ;  Surgeon: Jojo Mcintyre MD;  [facility];  Service:    [address] W/ RETROGRADES Bilateral 7/27/2016    Procedure: Hilda Hackett; RETROGRADE PYELOGRAM ;  Surgeon: Jojo Mcintyre MD;  [facility];  Service:     HERNIA REPAIR      IR AV FISTULAGRAM/GRAFTOGRAM  2/10/2021    IR NON-TUNNELED CENTRAL LINE PLACEMENT  7/17/2020    IR NON-TUNNELED CENTRAL LINE PLACEMENT  8/14/2020    LAPAROTOMY N/A 8/9/2020    Procedure: LAPAROTOMY EXPLORATORY, PARASTOMAL HERNIA REPAIR WITH MESH;  Surgeon: Venancio John DO;  [facility];  Service: General    WI ANASTOMOSIS,AV,ANY SITE Right 1/5/2021    Procedure: Creation of right brachiobasilic fistula; Surgeon: Memo Chairez MD;  [facility];  Service: Vascular    WI COLONOSCOPY FLX DX W/COLLJ SPEC WHEN PFRMD N/A 8/31/2016    Procedure: COLONOSCOPY;  Surgeon: Israel Sparks MD;  [facility];   Service: Gastroenterology    WI CYSTOSCOPY,INSERT URETERAL STENT Bilateral 7/27/2016    Procedure: STENT INSERTION; EXCISION OF MESH ;  Surgeon: Jojo Mcintyre MD;  [facility];  Service: Urology    TONSILLECTOMY      TUBAL LIGATION      WISDOM TOOTH EXTRACTION     :    Medications, Allergies:     Current Facility-Administered Medications   Medication Dose Route Frequency    acetaminophen (TYLENOL) tablet 650 mg  650 mg Oral Q6H PRN    atorvastatin (LIPITOR) tablet 40 mg  40 mg Oral Daily With Dinner    calcitriol (ROCALTROL) capsule 0 25 mcg  0 25 mcg Oral Every Other Day    cholecalciferol (VITAMIN D3) tablet 1,000 Units  1,000 Units Oral Daily    citalopram (CeleXA) tablet 20 mg  20 mg Oral Daily    levothyroxine tablet 75 mcg  75 mcg Oral Early Morning    melatonin tablet 3 mg  3 mg Oral HS    metoprolol tartrate (LOPRESSOR) tablet 25 mg  25 mg Oral BID    saccharomyces boulardii (FLORASTOR) capsule 250 mg  250 mg Oral Daily    sodium chloride 0 9 % infusion  50 mL/hr Intravenous Continuous       Allergies: Allergies   Allergen Reactions    Chlorhexidine Rash     petichi like rash when using chlorhexidine swabs prior to IV     :    Social and Family History:   Social History:   Social History     Tobacco Use    Smoking status: Never Smoker    Smokeless tobacco: Never Used    Tobacco comment: n/a   Substance Use Topics    Alcohol use: Not Currently     Frequency: Never     Binge frequency: Never     Comment: n/s    Drug use: Not Currently     Comment: n/a     Social History     Tobacco Use   Smoking Status Never Smoker   Smokeless Tobacco Never Used   Tobacco Comment    n/a       Family History:  Family History   Problem Relation Age of Onset    Cancer Mother         small cell cancer     Heart disease Father     COPD Father     Heart disease Brother     Nephrolithiasis Brother    :     Review of Systems:     General: Fever, chills, or night sweats: negative  Cardiac: Negative for chest pain  Pulmonary: Negative for shortness of breath  Gastrointestinal: Abdominal pain negative  Nausea, vomiting, or diarrhea negative,  Genitourinary: See HPI above  Patient does have hematuria  All other systems queried were negative  Physical Exam:   General: Patient is pleasant and in NAD   Awake and alert  /74   Pulse 71   Temp 99 4 °F (37 4 °C)   Resp 18   Ht 5' (1 524 m)   Wt 89 5 kg (197 lb 4 8 oz)   SpO2 92%   BMI 38 53 kg/m² Temp (24hrs), Av 7 °F (37 1 °C), Min:98 2 °F (36 8 °C), Max:99 9 °F (37 7 °C)  current; Temperature: 99 4 °F (37 4 °C)  I/O last 24 hours: In: 740 [I V :740]  Out: 1175 [Urine:1175]  Head: Normocephalic, without obvious abnormality, atraumatic  Eyes: negative  Lungs: clear to auscultation bilaterally  Heart: regular rate and rhythm  Abdomen: soft, non-tender; bowel sounds normal; no masses,  no organomegaly  Extremities: extremities normal, warm and well-perfused; no cyanosis, clubbing, or edema  Neurologic: Grossly normal    (Male):  Ileal conduit is pink and patent with wine colored hematuria, however non viscous without clots  No CVA tenderness  Labs:     Lab Results   Component Value Date    HGB 8 0 (L) 2021    HCT 25 8 (L) 2021    WBC 4 70 2021     2021   ]    Lab Results   Component Value Date     2015    K 4 4 2021     (H) 2021    CO2 21 2021    BUN 43 (H) 2021    CREATININE 3 57 (H) 2021    CALCIUM 8 3 2021    GLUCOSE 138 10/04/2016   ]    Imaging:   I personally reviewed the images and report of the following studies, and reviewed them with the patient:    CT Abdomen: Personally reviewed by me with left hydronephrosis, stranding, and hydroureter down to the level of the conduit, indicative of anastomotic stricture  ASSESSMENT:     77 y o  old female with  gross hematuria, left hydroureteronephrosis to the level of the conduit, indicative of anastomotic stricture  LUANN/CKD  PLAN:     Discussed with patient  Plan is initial hydration both IV and p o  Hematuria should hopefully clear  No obvious source of bleeding  She does not have a cancer history  Regarding left renal obstruction, observe renal function with hydration  If he returns towards baseline may consider continued observation, although it does seem that her hydronephrosis and hydroureter may be contributing to renal dysfunction    My recommendations for percutaneous drainage in order to optimize function and determine true baseline renal function  Anticoagulation will need to be managed  IR consultation has already been placed  Plans with discussed with the patient in detail  Thank you for allowing me to participate in this patients care  Please do not hesitate to call with any additional questions    Radha Wong MD

## 2021-05-09 NOTE — ASSESSMENT & PLAN NOTE
Lab Results   Component Value Date    EGFR 12 05/09/2021    EGFR 12 05/08/2021    EGFR 13 04/15/2021    CREATININE 3 57 (H) 05/09/2021    CREATININE 3 68 (H) 05/08/2021    CREATININE 3 41 (H) 04/15/2021   Estimated Creatinine Clearance: 13 8 mL/min (A) (by C-G formula based on SCr of 3 57 mg/dL (H))    · Monitor Cr  · Cr near baseline 3 4-3 5  · Gentle NS  · Likely will improve with nephrostomy tube

## 2021-05-09 NOTE — ASSESSMENT & PLAN NOTE
· Patient presented with gross hematuria  · CT shows: New left hydronephrosis with perinephric stranding suggests the possibility of more acute obstruction and/or pyelonephritis     · Urology consulted and appreciated input  · IR has been consulted, plan is for nephrostomy on Monday  · Monitor H/H  · Baseline hgb is 9-10  · Presented with hgb 8  · Blood consent signed in case Hgb < 7  · F/u urine culture  · No s/s of infection, afebrile, no leukocytosis, therefore hold abx

## 2021-05-09 NOTE — PLAN OF CARE
Problem: Potential for Falls  Goal: Patient will remain free of falls  Description: INTERVENTIONS:  - Assess patient frequently for physical needs  -  Identify cognitive and physical deficits and behaviors that affect risk of falls    -  [place] fall precautions as indicated by assessment   - Educate patient/family on patient safety including physical limitations  - Instruct patient to call for assistance with activity based on assessment  - Modify environment to reduce risk of injury  - Consider OT/PT consult to assist with strengthening/mobility  Outcome: Progressing     Problem: PAIN - ADULT  Goal: Verbalizes/displays adequate comfort level or baseline comfort level  Description: Interventions:  - Encourage patient to monitor pain and request assistance  - Assess pain using appropriate pain scale  - Administer analgesics based on type and severity of pain and evaluate response  - Implement non-pharmacological measures as appropriate and evaluate response  - Consider cultural and social influences on pain and pain management  - Notify physician/advanced practitioner if interventions unsuccessful or patient reports new pain  Outcome: Progressing     Problem: INFECTION - ADULT  Goal: Absence or prevention of progression during hospitalization  Description: INTERVENTIONS:  - Assess and monitor for signs and symptoms of infection  - Monitor lab/diagnostic results  - Monitor all insertion sites, i e  indwelling lines, tubes, and drains  - Monitor endotracheal if appropriate and nasal secretions for changes in amount and color  - [place] appropriate cooling/warming therapies per order  - Administer medications as ordered  - Instruct and encourage patient and family to use good hand hygiene technique  - Identify and instruct in appropriate isolation precautions for identified infection/condition  Outcome: Progressing  Goal: Absence of fever/infection during neutropenic period  Description: INTERVENTIONS:  - Monitor WBC    Outcome: Progressing     Problem: SAFETY ADULT  Goal: Patient will remain free of falls  Description: INTERVENTIONS:  - Assess patient frequently for physical needs  -  Identify cognitive and physical deficits and behaviors that affect risk of falls    -  [place] fall precautions as indicated by assessment   - Educate patient/family on patient safety including physical limitations  - Instruct patient to call for assistance with activity based on assessment  - Modify environment to reduce risk of injury  - Consider OT/PT consult to assist with strengthening/mobility  Outcome: Progressing  Goal: Maintain or return to baseline ADL function  Description: INTERVENTIONS:  -  Assess patient's ability to carry out ADLs; assess patient's baseline for ADL function and identify physical deficits which impact ability to perform ADLs (bathing, care of mouth/teeth, toileting, grooming, dressing, etc )  - Assess/evaluate cause of self-care deficits   - Assess range of motion  - Assess patient's mobility; develop plan if impaired  - Assess patient's need for assistive devices and provide as appropriate  - Encourage maximum independence but intervene and supervise when necessary  - Involve family in performance of ADLs  - Assess for home care needs following discharge   - Consider OT consult to assist with ADL evaluation and planning for discharge  - Provide patient education as appropriate  Outcome: Progressing  Goal: Maintain or return mobility status to optimal level  Description: INTERVENTIONS:  - Assess patient's baseline mobility status (ambulation, transfers, stairs, etc )    - Identify cognitive and physical deficits and behaviors that affect mobility  - Identify mobility aids required to assist with transfers and/or ambulation (gait belt, sit-to-stand, lift, walker, cane, etc )  - [place] fall precautions as indicated by assessment  - Record patient progress and toleration of activity level on Mobility SBAR; progress patient to next Phase/Stage  - Instruct patient to call for assistance with activity based on assessment  - Consider rehabilitation consult to assist with strengthening/weightbearing, etc   Outcome: Progressing     Problem: DISCHARGE PLANNING  Goal: Discharge to home or other facility with appropriate resources  Description: INTERVENTIONS:  - Identify barriers to discharge w/patient and caregiver  - Arrange for needed discharge resources and transportation as appropriate  - Identify discharge learning needs (meds, wound care, etc )  - Arrange for interpretive services to assist at discharge as needed  - Refer to Case Management Department for coordinating discharge planning if the patient needs post-hospital services based on physician/advanced practitioner order or complex needs related to functional status, cognitive ability, or social support system  Outcome: Progressing     Problem: Knowledge Deficit  Goal: Patient/family/caregiver demonstrates understanding of disease process, treatment plan, medications, and discharge instructions  Description: Complete learning assessment and assess knowledge base    Interventions:  - Provide teaching at level of understanding  - Provide teaching via preferred learning methods  Outcome: Progressing

## 2021-05-09 NOTE — CONSULTS
Consultation - Interventional Radiology  Darnell Hare 77 y o  female MRN: [id number]  Unit/Bed#: [facility] 803-01 Encounter: [id number]        IP Consult to IR  Consult performed by: Marian Klinefelter, MD  Consult ordered by: Kavin Hackett DO          ASSESSMENT/PLAN:    Ace Barnesus w/ fall and hematuria  Prior cystectomy for nonfunctional bladder with ileal conduit 2016  CKD with worsenign Cr    CT personally reviewed, new left hydronephrosis  This is probably contributing to worsening renal function  Distal stricture is suspected and therefore decompression via percutaneous approach is indicated  Patient is on aspirin and Eliquis    Currently no evidence of infection    Recommendation  - must balance risk and benefit of anticoagulation and nephrostomy timing  - if creatinine does not improve placement will be indicated  - if signs of infection (fever/leukocytosis) develope more urgent placement will be warranted    - plan for add on status this week with anesthesia support, consider NPO after midnight Sunday to determine if timing is possible Monday      - please determine when last aspirin dose was and document      Problem List     * (Principal) Gross hematuria    Chronic saddle pulmonary embolism (HCC) (Chronic)    Bladder mass    Small bowel obstruction (HCC)    Obstructive uropathy    Overview Signed 5/17/2018 11:36 AM by Alverto Webster MD     Obstructive uropathy and non functional bladder s/p supratrigonal cystectomy and ileal conduit 10/04/2016 by Dr Lan Aiken          Secondary hyperparathyroidism of renal origin [facility])    Hypothyroidism    Hypertension    Polycythemia vera ([facility] [address] )    Subdural hematoma, acute (HCC)    Intraventricular hemorrhage (HCC)    Recurrent Clostridium difficile diarrhea    Anemia in CKD (chronic kidney disease)    Mass of urethra    Stage 5 chronic kidney disease not on chronic dialysis [facility])    Lab Results   Component Value Date    EGFR 12 05/09/2021    EGFR 12 05/08/2021    EGFR 13 04/15/2021    CREATININE 3 57 (H) 05/09/2021    CREATININE 3 68 (H) 05/08/2021    CREATININE 3 41 (H) 04/15/2021         Thoracic compression fracture (HCC)    Hematuria    Abnormal finding on MRI of brain    Benign neoplasm of supratentorial region of brain ([address] )    Meningioma (HCC)    UTI (urinary tract infection)    Herpes zoster    Inverted T wave    Polycythemia    Encounter for surgical aftercare following surgery on the digestive system    History of Clostridioides difficile colitis    History of shingles    Depression    Adult BMI 39 0-39 9 kg/sq m    Chronic thrombosis of subclavian vein (HCC)    Overview Signed 1/5/2021  9:05 AM by Monica Simpson MD     right         Swelling of right upper extremity    Hyperlipemia    Hydronephrosis of left kidney          Reason for Consult / Principal Problem:    HPI: Michael Huber is a 77y o  year old female who presents with Gross hematuria      Review of Systems      Past Medical History:  Past Medical History:   Diagnosis Date    Anxiety     Bowel obstruction (HCC)     Chronic kidney disease (CKD), stage IV (severe) (HCC)     stage IV    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    Circulation problem     Compression fracture of cervical spine ([address] )     Hernia of abdominal cavity     History of kidney problems     Hydronephrosis     Hypertension     IBS (irritable bowel syndrome)     Incontinence     Lung mass     Improving on PET/CT 1/2016    Polycythemia vera ([address] )     Pulmonary embolism ([address] ) 2014    Shingles     Urinary tract infection        Past Surgical History:  Past Surgical History:   Procedure Laterality Date    ABDOMINAL ADHESION SURGERY N/A 8/9/2020    Procedure: LYSIS ADHESIONS;  Surgeon: Markus Osler, DO;  [facility];  Service: General    BLADDER SUSPENSION      BOTOX INJECTION N/A 7/27/2016    Procedure: BOTOX INJECTION ;  Surgeon: Rosa Maria Hebert MD;  [facility];  Service:    Yuliana Hubbard CHOLECYSTECTOMY N/A     COLONOSCOPY      CYSTECTOMY, RADICAL WITH ILEOCONDUIT N/A 10/4/2016    Procedure: SUPRATRIGONAL CYSTECTOMY WITH ILEAL CONDUIT ;  Surgeon: Jorge A Silverman MD;  [facility];  Service:    Elizabeth Onofre CYSTOSCOPY W/ RETROGRADES Bilateral 7/27/2016    Procedure: Orlando Augustin; RETROGRADE PYELOGRAM ;  Surgeon: Jorge A Silverman MD;  [facility];  Service:     HERNIA REPAIR      IR AV FISTULAGRAM/GRAFTOGRAM  2/10/2021    IR NON-TUNNELED CENTRAL LINE PLACEMENT  7/17/2020    IR NON-TUNNELED CENTRAL LINE PLACEMENT  8/14/2020    LAPAROTOMY N/A 8/9/2020    Procedure: LAPAROTOMY EXPLORATORY, PARASTOMAL HERNIA REPAIR WITH MESH;  Surgeon: Samantha Nascimento DO;  [facility];  Service: General    AL ANASTOMOSIS,AV,ANY SITE Right 1/5/2021    Procedure: Creation of right brachiobasilic fistula; Surgeon: Anthony Chairez MD;  [facility];  Service: Vascular    AL COLONOSCOPY FLX DX W/COLLJ SPEC WHEN PFRMD N/A 8/31/2016    Procedure: COLONOSCOPY;  Surgeon: Denise Olvera MD;  Location: [facility]; Service: Gastroenterology    AL CYSTOSCOPY,INSERT URETERAL STENT Bilateral 7/27/2016    Procedure: STENT INSERTION; EXCISION OF MESH ;  Surgeon: Jorge A Silverman MD;  [facility];  Service: Urology    TONSILLECTOMY      TUBAL LIGATION      WISDOM TOOTH EXTRACTION         Social History:  Social History     Substance and Sexual Activity   Alcohol Use Not Currently    Frequency: Never    Binge frequency: Never    Comment: n/s     Social History     Substance and Sexual Activity   Drug Use Not Currently    Comment: n/a     Social History     Tobacco Use   Smoking Status Never Smoker   Smokeless Tobacco Never Used   Tobacco Comment    n/a       Family History:  Family History   Problem Relation Age of Onset    Cancer Mother         small cell cancer     Heart disease Father     COPD Father     Heart disease Brother     Nephrolithiasis Brother        Allergies:   Allergies Allergen Reactions    Chlorhexidine Rash     petichi like rash when using chlorhexidine swabs prior to IV  Medications:  Medications Prior to Admission   Medication    aspirin (ECOTRIN LOW STRENGTH) 81 mg EC tablet    atorvastatin (LIPITOR) 40 mg tablet    calcitriol (ROCALTROL) 0 25 mcg capsule    Cholecalciferol (VITAMIN D3) 1000 UNITS CAPS    citalopram (CeleXA) 20 mg tablet    Eliquis 2 5 MG    levothyroxine 75 mcg tablet    melatonin 3 mg    metoprolol tartrate (LOPRESSOR) 25 mg tablet    ruxolitinib (Jakafi) 10 mg tablet    saccharomyces boulardii (FLORASTOR) 250 mg capsule    acetaminophen (TYLENOL) 325 mg tablet    loperamide (IMODIUM) 2 mg capsule     Current Facility-Administered Medications   Medication Dose Route Frequency    acetaminophen (TYLENOL) tablet 650 mg  650 mg Oral Q6H PRN    atorvastatin (LIPITOR) tablet 40 mg  40 mg Oral Daily With Dinner    calcitriol (ROCALTROL) capsule 0 25 mcg  0 25 mcg Oral Every Other Day    cholecalciferol (VITAMIN D3) tablet 1,000 Units  1,000 Units Oral Daily    citalopram (CeleXA) tablet 20 mg  20 mg Oral Daily    heparin (porcine) subcutaneous injection 5,000 Units  5,000 Units Subcutaneous Q8H [facility]    levothyroxine tablet 75 mcg  75 mcg Oral Early Morning    melatonin tablet 3 mg  3 mg Oral HS    metoprolol tartrate (LOPRESSOR) tablet 25 mg  25 mg Oral BID    ruxolitinib (JAKAFI) tablet 10 mg  10 mg Oral Daily    saccharomyces boulardii (FLORASTOR) capsule 250 mg  250 mg Oral Daily    sodium chloride 0 9 % infusion  50 mL/hr Intravenous Continuous       Vitals:  /74   Pulse 71   Temp 99 4 °F (37 4 °C)   Resp 18   Ht 5' (1 524 m)   Wt 89 5 kg (197 lb 4 8 oz)   SpO2 92%   BMI 38 53 kg/m²   Body mass index is 38 53 kg/m²    Weight (last 2 days)     Date/Time   Weight    05/08/21 14:35:04   89 5 (197 3)              I/Os:    Intake/Output Summary (Last 24 hours) at 5/9/2021 1204  Last data filed at 5/9/2021 7098  Gross per 24 hour   Intake 740 ml   Output 1175 ml   Net -435 ml       PHYSICAL EXAM  none    Lab Results and Cultures:   CBC with diff:   Lab Results   Component Value Date    WBC 4 70 05/09/2021    HGB 8 0 (L) 05/09/2021    HCT 25 8 (L) 05/09/2021    MCV 95 05/09/2021     05/09/2021    MCH 29 5 05/09/2021    MCHC 31 0 (L) 05/09/2021    RDW 13 8 05/09/2021    MPV 9 7 05/09/2021    NRBC 0 05/08/2021      BMP/CMP:  Lab Results   Component Value Date     12/17/2015    K 4 4 05/09/2021    K 3 7 12/17/2015     (H) 05/09/2021     12/17/2015    CO2 21 05/09/2021    CO2 28 10/04/2016    ANIONGAP 9 12/17/2015    BUN 43 (H) 05/09/2021    BUN 8 12/17/2015    CREATININE 3 57 (H) 05/09/2021    CREATININE 0 95 12/17/2015    GLUCOSE 138 10/04/2016    GLUCOSE 96 12/17/2015    CALCIUM 8 3 05/09/2021    CALCIUM 8 9 12/17/2015    AST 16 05/08/2021     (H) 11/06/2015    ALT 12 05/08/2021    ALT 25 11/06/2015    ALKPHOS 85 05/08/2021    ALKPHOS 215 (H) 11/06/2015    PROT 6 2 (L) 11/06/2015    BILITOT 0 49 11/06/2015    EGFR 12 05/09/2021   ,     Coags:   Lab Results   Component Value Date    PTT 34 05/08/2021    PTT 50 (H) 10/31/2015    INR 1 70 (H) 05/08/2021    INR 1 42 (H) 11/03/2015   ,   Results from last 7 days   Lab Units 05/08/21  1219   PTT seconds 34   INR  1 70*        Lipid Panel:   Lab Results   Component Value Date    CHOL 175 05/20/2015     Lab Results   Component Value Date    HDL 70 (H) 04/18/2019     Lab Results   Component Value Date    HDL 70 (H) 04/18/2019     Lab Results   Component Value Date    LDLCALC 125 (H) 04/18/2019     Lab Results   Component Value Date    TRIG 110 04/18/2019       HgbA1c:   Lab Results   Component Value Date    HGBA1C 5 5 09/02/2020    HGBA1C 5 1 05/20/2015       Blood Culture:   Lab Results   Component Value Date    BLOODCX No Growth After 5 Days  05/23/2019    BLOODCX No Growth After 5 Days   05/23/2019   ,   Urinalysis:   Lab Results   Component Value Date Terell [place]o 05/08/2021    COLORU Yellow 09/09/2015    CLARITYU Turbid 05/08/2021    CLARITYU Cloudy 09/09/2015    SPECGRAV 1 013 05/08/2021    SPECGRAV <=1 005 09/09/2015    PHUR Interference-unable to analyze (A) 05/08/2021    PHUR 6 5 07/14/2020    PHUR 6 0 09/09/2015    LEUKOCYTESUR Interference- unable to analyze (A) 05/08/2021    LEUKOCYTESUR Large (A) 09/09/2015    NITRITE Interference- unable to analyze 05/08/2021    NITRITE Positive (A) 09/09/2015    PROTEINUA 30 (1+) (A) 09/09/2015    GLUCOSEU Interference- unable to analyze 05/08/2021    GLUCOSEU Negative 09/09/2015    KETONESU Interference- unable to analyze (A) 05/08/2021    KETONESU Negative 09/09/2015    BILIRUBINUR Interference- unable to analyze (A) 05/08/2021    BILIRUBINUR Negative 09/09/2015    BLOODU Interference- unable to analyze (A) 05/08/2021    BLOODU Moderate (A) 09/09/2015   ,   Urine Culture:   Lab Results   Component Value Date    URINECX >100,000 cfu/ml Escherichia coli (A) 07/14/2020    URINECX 20,000-29,000 cfu/ml  07/14/2020   ,   Wound Culure:  No results found for: WOUNDCULT    Imaging Studies: I have personally reviewed pertinent films in PACS    Counseling / Coordination of Care  Total time spent today  20 minutes  Greater than 50% of total time was spent with the patient and / or family counseling and / or coordination of care  Thank you for allowing me to participate in the care of Luba Braden

## 2021-05-09 NOTE — PLAN OF CARE
Problem: Potential for Falls  Goal: Patient will remain free of falls  Description: INTERVENTIONS:  - Assess patient frequently for physical needs  -  Identify cognitive and physical deficits and behaviors that affect risk of falls    -  [place] fall precautions as indicated by assessment   - Educate patient/family on patient safety including physical limitations  - Instruct patient to call for assistance with activity based on assessment  - Modify environment to reduce risk of injury  - Consider OT/PT consult to assist with strengthening/mobility  Outcome: Progressing     Problem: PAIN - ADULT  Goal: Verbalizes/displays adequate comfort level or baseline comfort level  Description: Interventions:  - Encourage patient to monitor pain and request assistance  - Assess pain using appropriate pain scale  - Administer analgesics based on type and severity of pain and evaluate response  - Implement non-pharmacological measures as appropriate and evaluate response  - Consider cultural and social influences on pain and pain management  - Notify physician/advanced practitioner if interventions unsuccessful or patient reports new pain  Outcome: Progressing     Problem: INFECTION - ADULT  Goal: Absence or prevention of progression during hospitalization  Description: INTERVENTIONS:  - Assess and monitor for signs and symptoms of infection  - Monitor lab/diagnostic results  - Monitor all insertion sites, i e  indwelling lines, tubes, and drains  - Monitor endotracheal if appropriate and nasal secretions for changes in amount and color  - [place] appropriate cooling/warming therapies per order  - Administer medications as ordered  - Instruct and encourage patient and family to use good hand hygiene technique  - Identify and instruct in appropriate isolation precautions for identified infection/condition  Outcome: Progressing  Goal: Absence of fever/infection during neutropenic period  Description: INTERVENTIONS:  - Monitor WBC    Outcome: Progressing     Problem: SAFETY ADULT  Goal: Patient will remain free of falls  Description: INTERVENTIONS:  - Assess patient frequently for physical needs  -  Identify cognitive and physical deficits and behaviors that affect risk of falls    -  [place] fall precautions as indicated by assessment   - Educate patient/family on patient safety including physical limitations  - Instruct patient to call for assistance with activity based on assessment  - Modify environment to reduce risk of injury  - Consider OT/PT consult to assist with strengthening/mobility  Outcome: Progressing  Goal: Maintain or return to baseline ADL function  Description: INTERVENTIONS:  -  Assess patient's ability to carry out ADLs; assess patient's baseline for ADL function and identify physical deficits which impact ability to perform ADLs (bathing, care of mouth/teeth, toileting, grooming, dressing, etc )  - Assess/evaluate cause of self-care deficits   - Assess range of motion  - Assess patient's mobility; develop plan if impaired  - Assess patient's need for assistive devices and provide as appropriate  - Encourage maximum independence but intervene and supervise when necessary  - Involve family in performance of ADLs  - Assess for home care needs following discharge   - Consider OT consult to assist with ADL evaluation and planning for discharge  - Provide patient education as appropriate  Outcome: Progressing  Goal: Maintain or return mobility status to optimal level  Description: INTERVENTIONS:  - Assess patient's baseline mobility status (ambulation, transfers, stairs, etc )    - Identify cognitive and physical deficits and behaviors that affect mobility  - Identify mobility aids required to assist with transfers and/or ambulation (gait belt, sit-to-stand, lift, walker, cane, etc )  - [place] fall precautions as indicated by assessment  - Record patient progress and toleration of activity level on Mobility SBAR; progress patient to next Phase/Stage  - Instruct patient to call for assistance with activity based on assessment  - Consider rehabilitation consult to assist with strengthening/weightbearing, etc   Outcome: Progressing     Problem: DISCHARGE PLANNING  Goal: Discharge to home or other facility with appropriate resources  Description: INTERVENTIONS:  - Identify barriers to discharge w/patient and caregiver  - Arrange for needed discharge resources and transportation as appropriate  - Identify discharge learning needs (meds, wound care, etc )  - Arrange for interpretive services to assist at discharge as needed  - Refer to Case Management Department for coordinating discharge planning if the patient needs post-hospital services based on physician/advanced practitioner order or complex needs related to functional status, cognitive ability, or social support system  Outcome: Progressing     Problem: Knowledge Deficit  Goal: Patient/family/caregiver demonstrates understanding of disease process, treatment plan, medications, and discharge instructions  Description: Complete learning assessment and assess knowledge base    Interventions:  - Provide teaching at level of understanding  - Provide teaching via preferred learning methods  Outcome: Progressing

## 2021-05-09 NOTE — CONSULTS
UROLOGY CONSULTATION NOTE     Patient Identifiers: Mark Rocha (MRN [id number])  Service Requesting Consultation:  Medicine  Service Providing Consultation:  Urology, Erik Youssef MD    Date of Service: 5/9/2021  Inpatient consult to Urology  Consult performed by: Erik Youssef MD  Consult ordered by: Michael Babcock DO          Reason for Consultation:  Gross hematuria, left hydronephrosis    History of Present Illness:     Mark Rocha is a 77 y o  old with a history of end-stage bladder, status post super trigonal cystectomy for palliation  This was performed in conjunction with ileal conduit urinary diversion by Dr Belkis Jones in 2016  Indication for this procedure include renal failure due to elevated bladder pressures  She has since developed a parastomal hernia with obstruction requiring surgical repair in 2019  She reported to the emergency department due to finding of gross hematuria  She denies any pain associated with this  She does take Eliquis and aspirin  No nausea, vomiting, fever, chills  CT scan performed on evaluation reveals worsening left hydronephrosis as well as perinephric stranding indicating more acute obstruction and/or pyelonephritis  This appears potentially due to an anastomotic stricture at surgical site  Her baseline creatinine is apparently between 2 5 and 3  Current creatinine 3 68      Past Medical, Past Surgical History:     Past Medical History:   Diagnosis Date    Anxiety     Bowel obstruction (HCC)     Chronic kidney disease (CKD), stage IV (severe) (HCC)     stage IV    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    Circulation problem     Compression fracture of cervical spine (HCC)     Hernia of abdominal cavity     History of kidney problems     Hydronephrosis     Hypertension     IBS (irritable bowel syndrome)     Incontinence     Lung mass     Improving on PET/CT 1/2016    Polycythemia vera ([address] )     [facility]monary [facility]ism [facility]) 2014    Shingles     Urinary tract infection    :    Past Surgical History:   Procedure Laterality Date    ABDOMINAL ADHESION SURGERY N/A 8/9/2020    Procedure: LYSIS ADHESIONS;  Surgeon: Mary Jo Chan DO;  [facility];  Service: General    BLADDER SUSPENSION      BOTOX INJECTION N/A 7/27/2016    Procedure: BOTOX INJECTION ;  Surgeon: Cathleen Osorio MD;  [facility];  Service:    [address], RADICAL WITH ILEOCONDUIT N/A 10/4/2016    Procedure: SUPRATRIGONAL CYSTECTOMY WITH ILEAL CONDUIT ;  Surgeon: Cathleen Osorio MD;  [facility];  Service:    [address] W/ RETROGRADES Bilateral 7/27/2016    Procedure: Sheryle Lehmann; RETROGRADE PYELOGRAM ;  Surgeon: Cathleen Osorio MD;  [facility];  Service:     HERNIA REPAIR      IR AV FISTULAGRAM/GRAFTOGRAM  2/10/2021    IR NON-TUNNELED CENTRAL LINE PLACEMENT  7/17/2020    IR NON-TUNNELED CENTRAL LINE PLACEMENT  8/14/2020    LAPAROTOMY N/A 8/9/2020    Procedure: LAPAROTOMY EXPLORATORY, PARASTOMAL HERNIA REPAIR WITH MESH;  Surgeon: Mary Jo Cahn DO;  [facility];  Service: General    VA ANASTOMOSIS,AV,ANY SITE Right 1/5/2021    Procedure: Creation of right brachiobasilic fistula; Surgeon: Yuliana Chairez MD;  [facility];  Service: Vascular    VA COLONOSCOPY FLX DX W/COLLJ SPEC WHEN PFRMD N/A 8/31/2016    Procedure: COLONOSCOPY;  Surgeon: Tammy Angulo MD;  [facility];   Service: Gastroenterology    VA CYSTOSCOPY,INSERT URETERAL STENT Bilateral 7/27/2016    Procedure: STENT INSERTION; EXCISION OF MESH ;  Surgeon: Cathleen Osorio MD;  [facility];  Service: Urology    TONSILLECTOMY      TUBAL LIGATION      WISDOM TOOTH EXTRACTION     :    Medications, Allergies:     Current Facility-Administered Medications   Medication Dose Route Frequency    acetaminophen (TYLENOL) tablet 650 mg  650 mg Oral Q6H PRN    atorvastatin (LIPITOR) tablet 40 mg  40 mg Oral Daily With Dinner    calcitriol (ROCALTROL) capsule 0 25 mcg  0 25 mcg Oral Every Other Day    cholecalciferol (VITAMIN D3) tablet 1,000 Units  1,000 Units Oral Daily    citalopram (CeleXA) tablet 20 mg  20 mg Oral Daily    levothyroxine tablet 75 mcg  75 mcg Oral Early Morning    melatonin tablet 3 mg  3 mg Oral HS    metoprolol tartrate (LOPRESSOR) tablet 25 mg  25 mg Oral BID    saccharomyces boulardii (FLORASTOR) capsule 250 mg  250 mg Oral Daily    sodium chloride 0 9 % infusion  50 mL/hr Intravenous Continuous       Allergies: Allergies   Allergen Reactions    Chlorhexidine Rash     petichi like rash when using chlorhexidine swabs prior to IV     :    Social and Family History:   Social History:   Social History     Tobacco Use    Smoking status: Never Smoker    Smokeless tobacco: Never Used    Tobacco comment: n/a   Substance Use Topics    Alcohol use: Not Currently     Frequency: Never     Binge frequency: Never     Comment: n/s    Drug use: Not Currently     Comment: n/a     Social History     Tobacco Use   Smoking Status Never Smoker   Smokeless Tobacco Never Used   Tobacco Comment    n/a       Family History:  Family History   Problem Relation Age of Onset    Cancer Mother         small cell cancer     Heart disease Father     COPD Father     Heart disease Brother     Nephrolithiasis Brother    :     Review of Systems:     General: Fever, chills, or night sweats: negative  Cardiac: Negative for chest pain  Pulmonary: Negative for shortness of breath  Gastrointestinal: Abdominal pain negative  Nausea, vomiting, or diarrhea negative,  Genitourinary: See HPI above  Patient does have hematuria  All other systems queried were negative  Physical Exam:   General: Patient is pleasant and in NAD   Awake and alert  /74   Pulse 71   Temp 99 4 °F (37 4 °C)   Resp 18   Ht 5' (1 524 m)   Wt 89 5 kg (197 lb 4 8 oz)   SpO2 92%   BMI 38 53 kg/m² Temp (24hrs), Av 7 °F (37 1 °C), Min:98 2 °F (36 8 °C), Max:99 9 °F (37 7 °C)  current; Temperature: 99 4 °F (37 4 °C)  I/O last 24 hours: In: 740 [I V :740]  Out: 1175 [Urine:1175]  Head: Normocephalic, without obvious abnormality, atraumatic  Eyes: negative  Lungs: clear to auscultation bilaterally  Heart: regular rate and rhythm  Abdomen: soft, non-tender; bowel sounds normal; no masses,  no organomegaly  Extremities: extremities normal, warm and well-perfused; no cyanosis, clubbing, or edema  Neurologic: Grossly normal    (Male):  Ileal conduit is pink and patent with wine colored hematuria, however non viscous without clots  No CVA tenderness  Labs:     Lab Results   Component Value Date    HGB 8 0 (L) 2021    HCT 25 8 (L) 2021    WBC 4 70 2021     2021   ]    Lab Results   Component Value Date     2015    K 4 4 2021     (H) 2021    CO2 21 2021    BUN 43 (H) 2021    CREATININE 3 57 (H) 2021    CALCIUM 8 3 2021    GLUCOSE 138 10/04/2016   ]    Imaging:   I personally reviewed the images and report of the following studies, and reviewed them with the patient:    CT Abdomen: Personally reviewed by me with left hydronephrosis, stranding, and hydroureter down to the level of the conduit, indicative of anastomotic stricture  ASSESSMENT:     77 y o  old female with  gross hematuria, left hydroureteronephrosis to the level of the conduit, indicative of anastomotic stricture  LUANN/CKD  PLAN:     Discussed with patient  Plan is initial hydration both IV and p o  Hematuria should hopefully clear  No obvious source of bleeding  She does not have a cancer history  Regarding left renal obstruction, observe renal function with hydration  If he returns towards baseline may consider continued observation, although it does seem that her hydronephrosis and hydroureter may be contributing to renal dysfunction    My recommendations for percutaneous drainage in order to optimize function and determine true baseline renal function  Anticoagulation will need to be managed  IR consultation has already been placed  Plans with discussed with the patient in detail  Thank you for allowing me to participate in this patients care  Please do not hesitate to call with any additional questions    Miguelito Moore MD

## 2021-05-10 ENCOUNTER — APPOINTMENT (INPATIENT)
Dept: RADIOLOGY | Facility: HOSPITAL | Age: 76
DRG: 690 | End: 2021-05-10
Attending: RADIOLOGY
Payer: COMMERCIAL

## 2021-05-10 ENCOUNTER — ANESTHESIA (INPATIENT)
Dept: RADIOLOGY | Facility: HOSPITAL | Age: 76
DRG: 690 | End: 2021-05-10
Payer: COMMERCIAL

## 2021-05-10 ENCOUNTER — ANESTHESIA EVENT (INPATIENT)
Dept: RADIOLOGY | Facility: HOSPITAL | Age: 76
DRG: 690 | End: 2021-05-10
Payer: COMMERCIAL

## 2021-05-10 LAB
ANION GAP SERPL CALCULATED.3IONS-SCNC: 7 MMOL/L (ref 4–13)
BUN SERPL-MCNC: 44 MG/DL (ref 5–25)
CALCIUM SERPL-MCNC: 8.2 MG/DL (ref 8.3–10.1)
CHLORIDE SERPL-SCNC: 112 MMOL/L (ref 100–108)
CO2 SERPL-SCNC: 20 MMOL/L (ref 21–32)
CREAT SERPL-MCNC: 3.52 MG/DL (ref 0.6–1.3)
ERYTHROCYTE [DISTWIDTH] IN BLOOD BY AUTOMATED COUNT: 13.8 % (ref 11.6–15.1)
GFR SERPL CREATININE-BSD FRML MDRD: 12 ML/MIN/1.73SQ M
GLUCOSE SERPL-MCNC: 99 MG/DL (ref 65–140)
HCT VFR BLD AUTO: 26.1 % (ref 34.8–46.1)
HGB BLD-MCNC: 8 G/DL (ref 11.5–15.4)
INR PPP: 1.27 (ref 0.84–1.19)
MCH RBC QN AUTO: 29.2 PG (ref 26.8–34.3)
MCHC RBC AUTO-ENTMCNC: 30.7 G/DL (ref 31.4–37.4)
MCV RBC AUTO: 95 FL (ref 82–98)
PLATELET # BLD AUTO: 347 THOUSANDS/UL (ref 149–390)
PMV BLD AUTO: 9.7 FL (ref 8.9–12.7)
POTASSIUM SERPL-SCNC: 4.5 MMOL/L (ref 3.5–5.3)
PROTHROMBIN TIME: 15.9 SECONDS (ref 11.6–14.5)
RBC # BLD AUTO: 2.74 MILLION/UL (ref 3.81–5.12)
SODIUM SERPL-SCNC: 139 MMOL/L (ref 136–145)
WBC # BLD AUTO: 5.17 THOUSAND/UL (ref 4.31–10.16)

## 2021-05-10 PROCEDURE — C1769 GUIDE WIRE: HCPCS

## 2021-05-10 PROCEDURE — BT121ZZ FLUOROSCOPY OF LEFT KIDNEY USING LOW OSMOLAR CONTRAST: ICD-10-PCS | Performed by: RADIOLOGY

## 2021-05-10 PROCEDURE — 76937 US GUIDE VASCULAR ACCESS: CPT

## 2021-05-10 PROCEDURE — C1894 INTRO/SHEATH, NON-LASER: HCPCS

## 2021-05-10 PROCEDURE — C1729 CATH, DRAINAGE: HCPCS

## 2021-05-10 PROCEDURE — 85027 COMPLETE CBC AUTOMATED: CPT | Performed by: FAMILY MEDICINE

## 2021-05-10 PROCEDURE — 99232 SBSQ HOSP IP/OBS MODERATE 35: CPT | Performed by: INTERNAL MEDICINE

## 2021-05-10 PROCEDURE — 87070 CULTURE OTHR SPECIMN AEROBIC: CPT | Performed by: RADIOLOGY

## 2021-05-10 PROCEDURE — 99232 SBSQ HOSP IP/OBS MODERATE 35: CPT | Performed by: UROLOGY

## 2021-05-10 PROCEDURE — 50432 PLMT NEPHROSTOMY CATHETER: CPT | Performed by: RADIOLOGY

## 2021-05-10 PROCEDURE — 87205 SMEAR GRAM STAIN: CPT | Performed by: RADIOLOGY

## 2021-05-10 PROCEDURE — 87186 SC STD MICRODIL/AGAR DIL: CPT | Performed by: RADIOLOGY

## 2021-05-10 PROCEDURE — 0T9130Z DRAINAGE OF LEFT KIDNEY WITH DRAINAGE DEVICE, PERCUTANEOUS APPROACH: ICD-10-PCS | Performed by: RADIOLOGY

## 2021-05-10 PROCEDURE — 85610 PROTHROMBIN TIME: CPT | Performed by: PHYSICIAN ASSISTANT

## 2021-05-10 PROCEDURE — 50432 PLMT NEPHROSTOMY CATHETER: CPT

## 2021-05-10 PROCEDURE — 87077 CULTURE AEROBIC IDENTIFY: CPT | Performed by: RADIOLOGY

## 2021-05-10 PROCEDURE — 80048 BASIC METABOLIC PNL TOTAL CA: CPT | Performed by: FAMILY MEDICINE

## 2021-05-10 PROCEDURE — 87086 URINE CULTURE/COLONY COUNT: CPT | Performed by: RADIOLOGY

## 2021-05-10 RX ORDER — OXYCODONE HYDROCHLORIDE 5 MG/1
2.5 TABLET ORAL EVERY 4 HOURS PRN
Status: DISCONTINUED | OUTPATIENT
Start: 2021-05-10 | End: 2021-05-16 | Stop reason: HOSPADM

## 2021-05-10 RX ORDER — LIDOCAINE WITH 8.4% SOD BICARB 0.9%(10ML)
SYRINGE (ML) INJECTION CODE/TRAUMA/SEDATION MEDICATION
Status: COMPLETED | OUTPATIENT
Start: 2021-05-10 | End: 2021-05-10

## 2021-05-10 RX ORDER — LIDOCAINE HYDROCHLORIDE 10 MG/ML
INJECTION, SOLUTION EPIDURAL; INFILTRATION; INTRACAUDAL; PERINEURAL AS NEEDED
Status: DISCONTINUED | OUTPATIENT
Start: 2021-05-10 | End: 2021-05-10

## 2021-05-10 RX ORDER — LEVOFLOXACIN 5 MG/ML
500 INJECTION, SOLUTION INTRAVENOUS ONCE
Status: DISCONTINUED | OUTPATIENT
Start: 2021-05-10 | End: 2021-05-15

## 2021-05-10 RX ORDER — FENTANYL CITRATE/PF 50 MCG/ML
25 SYRINGE (ML) INJECTION
Status: DISCONTINUED | OUTPATIENT
Start: 2021-05-10 | End: 2021-05-16 | Stop reason: HOSPADM

## 2021-05-10 RX ORDER — EPHEDRINE SULFATE 50 MG/ML
INJECTION INTRAVENOUS AS NEEDED
Status: DISCONTINUED | OUTPATIENT
Start: 2021-05-10 | End: 2021-05-10

## 2021-05-10 RX ORDER — SODIUM CHLORIDE 9 MG/ML
10 INJECTION INTRAVENOUS DAILY
Qty: 300 ML | Refills: 0 | Status: SHIPPED | OUTPATIENT
Start: 2021-05-10 | End: 2021-05-20 | Stop reason: SDUPTHER

## 2021-05-10 RX ORDER — FENTANYL CITRATE 50 UG/ML
INJECTION, SOLUTION INTRAMUSCULAR; INTRAVENOUS AS NEEDED
Status: DISCONTINUED | OUTPATIENT
Start: 2021-05-10 | End: 2021-05-10

## 2021-05-10 RX ORDER — SUCCINYLCHOLINE/SOD CL,ISO/PF 100 MG/5ML
SYRINGE (ML) INTRAVENOUS AS NEEDED
Status: DISCONTINUED | OUTPATIENT
Start: 2021-05-10 | End: 2021-05-10

## 2021-05-10 RX ORDER — ONDANSETRON 2 MG/ML
INJECTION INTRAMUSCULAR; INTRAVENOUS AS NEEDED
Status: DISCONTINUED | OUTPATIENT
Start: 2021-05-10 | End: 2021-05-10

## 2021-05-10 RX ORDER — SODIUM CHLORIDE, SODIUM LACTATE, POTASSIUM CHLORIDE, CALCIUM CHLORIDE 600; 310; 30; 20 MG/100ML; MG/100ML; MG/100ML; MG/100ML
INJECTION, SOLUTION INTRAVENOUS CONTINUOUS PRN
Status: DISCONTINUED | OUTPATIENT
Start: 2021-05-10 | End: 2021-05-10

## 2021-05-10 RX ORDER — ONDANSETRON 2 MG/ML
4 INJECTION INTRAMUSCULAR; INTRAVENOUS ONCE AS NEEDED
Status: DISCONTINUED | OUTPATIENT
Start: 2021-05-10 | End: 2021-05-16 | Stop reason: HOSPADM

## 2021-05-10 RX ORDER — PROPOFOL 10 MG/ML
INJECTION, EMULSION INTRAVENOUS AS NEEDED
Status: DISCONTINUED | OUTPATIENT
Start: 2021-05-10 | End: 2021-05-10

## 2021-05-10 RX ADMIN — Medication 1000 UNITS: at 08:30

## 2021-05-10 RX ADMIN — METOPROLOL TARTRATE 25 MG: 25 TABLET, FILM COATED ORAL at 08:30

## 2021-05-10 RX ADMIN — ONDANSETRON 4 MG: 2 INJECTION INTRAMUSCULAR; INTRAVENOUS at 14:48

## 2021-05-10 RX ADMIN — SODIUM CHLORIDE, SODIUM LACTATE, POTASSIUM CHLORIDE, AND CALCIUM CHLORIDE: .6; .31; .03; .02 INJECTION, SOLUTION INTRAVENOUS at 14:40

## 2021-05-10 RX ADMIN — CALCITRIOL 0.25 MCG: 0.25 CAPSULE, LIQUID FILLED ORAL at 08:30

## 2021-05-10 RX ADMIN — PROPOFOL 150 MG: 10 INJECTION, EMULSION INTRAVENOUS at 14:40

## 2021-05-10 RX ADMIN — OXYCODONE HYDROCHLORIDE 2.5 MG: 5 TABLET ORAL at 23:51

## 2021-05-10 RX ADMIN — PHENYLEPHRINE HYDROCHLORIDE 50 MCG/MIN: 10 INJECTION INTRAVENOUS at 15:01

## 2021-05-10 RX ADMIN — IOHEXOL 40 ML: 350 INJECTION, SOLUTION INTRAVENOUS at 16:26

## 2021-05-10 RX ADMIN — PHENYLEPHRINE HYDROCHLORIDE 200 MCG: 10 INJECTION INTRAVENOUS at 14:59

## 2021-05-10 RX ADMIN — RUXOLITINIB 10 MG: 10 TABLET ORAL at 08:32

## 2021-05-10 RX ADMIN — FENTANYL CITRATE 25 MCG: 50 INJECTION INTRAMUSCULAR; INTRAVENOUS at 15:10

## 2021-05-10 RX ADMIN — Medication 250 MG: at 08:30

## 2021-05-10 RX ADMIN — FENTANYL CITRATE 25 MCG: 50 INJECTION INTRAMUSCULAR; INTRAVENOUS at 16:12

## 2021-05-10 RX ADMIN — PHENYLEPHRINE HYDROCHLORIDE 200 MCG: 10 INJECTION INTRAVENOUS at 15:02

## 2021-05-10 RX ADMIN — EPHEDRINE SULFATE 10 MG: 50 INJECTION, SOLUTION INTRAVENOUS at 15:15

## 2021-05-10 RX ADMIN — Medication 10 ML: at 15:23

## 2021-05-10 RX ADMIN — METOPROLOL TARTRATE 25 MG: 25 TABLET, FILM COATED ORAL at 18:37

## 2021-05-10 RX ADMIN — FENTANYL CITRATE 50 MCG: 50 INJECTION INTRAMUSCULAR; INTRAVENOUS at 14:40

## 2021-05-10 RX ADMIN — EPHEDRINE SULFATE 10 MG: 50 INJECTION, SOLUTION INTRAVENOUS at 15:03

## 2021-05-10 RX ADMIN — CITALOPRAM HYDROBROMIDE 20 MG: 20 TABLET ORAL at 08:30

## 2021-05-10 RX ADMIN — LIDOCAINE HYDROCHLORIDE 90 MG: 10 INJECTION, SOLUTION EPIDURAL; INFILTRATION; INTRACAUDAL; PERINEURAL at 14:40

## 2021-05-10 RX ADMIN — LEVOTHYROXINE SODIUM 75 MCG: 75 TABLET ORAL at 05:09

## 2021-05-10 RX ADMIN — OXYCODONE HYDROCHLORIDE 2.5 MG: 5 TABLET ORAL at 18:37

## 2021-05-10 RX ADMIN — LEVOFLOXACIN 500 MG: 5 INJECTION, SOLUTION INTRAVENOUS at 15:05

## 2021-05-10 RX ADMIN — Medication 100 MG: at 14:41

## 2021-05-10 NOTE — UTILIZATION REVIEW
Initial Clinical Review    Admission: Date/Time/Statement:   Admission Orders (From admission, onward)     Ordered        05/08/21 1351  Inpatient Admission  Once                   Orders Placed This Encounter   Procedures    Inpatient Admission     Standing Status:   Standing     Number of Occurrences:   1     Order Specific Question:   Level of Care     Answer:   Med Surg [16]     Order Specific Question:   Estimated length of stay     Answer:   More than 2 Midnights     Order Specific Question:   Certification     Answer:   I certify that inpatient services are medically necessary for this patient for a duration of greater than two midnights  See H&P and MD Progress Notes for additional information about the patient's course of treatment  ED Arrival Information     Expected Arrival Acuity Means of Arrival Escorted By Service Admission Type    - 5/8/2021 10:53 Urgent Walk-In Self Hospitalist Urgent    Arrival Complaint    blood in urine        Chief Complaint   Patient presents with    Blood in Urine     Pt has urostomy and noticed blood in urine, started over a week ago, worse today  Initial Presentation:   77 yof to ER from home c/o hematuria x 2 weeks, worsening today (on Eliquis & ASA)  Hx pulmonary embolisms, polycythemia vera, hypertension, ileal conduit, CKD  Presents with  Ileoconduit site appears healthy, normal  Stoma pink  Mild LLQ tenderness on exam  No CVA tenderness bilaterally  Output is dark, red urine, particular matter present in urine  Bruising over left knee  Mutiple bruises present on upper and lower extremities bilaterally  pt with reported fall 2 weeks ago  Admission work-up showing L hydronephrosis, perinephric stranding, likely ileus on imaging  Admitted to inpatient status for gross hematuria  IR & urology consulted  Date:5/9/21 Day 2:   Tmax 99 9  Persistent hematuria, dark urine noted in bag   Plan for perc neph possibly Monday or Tuesday when off Eliquis and ASA  Per urology:  gross hematuria, left hydroureteronephrosis to the level of the conduit, indicative of anastomotic stricture  LUANN/CKD  Ileal conduit is pink and patent with wine colored hematuria, however non viscous without clots  Regarding left renal obstruction, observe renal function with hydration  If returns towards baseline may consider continued observation, although it does seem that her hydronephrosis and hydroureter may be contributing to renal dysfunction  My recommendations for percutaneous drainage in order to optimize function and determine true baseline renal function  Anticoagulation will need to be managed       ED Triage Vitals   Temperature Pulse Respirations Blood Pressure SpO2   05/08/21 1126 05/08/21 1126 05/08/21 1126 05/08/21 1126 05/08/21 1126   98 2 °F (36 8 °C) 72 17 160/75 97 %      Temp Source Heart Rate Source Patient Position - Orthostatic VS BP Location FiO2 (%)   05/08/21 1126 05/08/21 1126 05/08/21 1126 05/08/21 1126 --   Oral Monitor Lying Left arm       Pain Score       05/08/21 1444       No Pain          Wt Readings from Last 1 Encounters:   05/08/21 89 5 kg (197 lb 4 8 oz)     Additional Vital Signs:   05/09/21 19:13:41  99 8 °F (37 7 °C)  --  --  --  --  --  --  --   05/09/21 14:40:41  --  --  18  146/75  99  --  --  --   05/09/21 0851  --  --  --  --  --  --  None (Room air)  --   05/09/21 08:00:29  99 4 °F (37 4 °C)  --  18  116/74  88  --  --  --   05/08/21 21:51:57  99 9 °F (37 7 °C)  71  16  152/85  107  92 %  --  --   05/08/21 1950  --  --  --  --  --  --  None (Room air)  --   05/08/21 16:01:09  98 2 °F (36 8 °C)  65  --  144/82  103  97 %  --  --   05/08/21 1444  --  --  --  --  --  --  None (Room air)  --     Pertinent Labs/Diagnostic Test Results:   Results from last 7 days   Lab Units 05/10/21  0511 05/09/21  0528 05/08/21  1831 05/08/21  1219   WBC Thousand/uL 5 17 4 70  --  5 35   HEMOGLOBIN g/dL 8 0* 8 0* 7 9* 8 7*   HEMATOCRIT % 26 1* 25 8* 25 7* 27 8*   PLATELETS Thousands/uL 347 344  --  395*   NEUTROS ABS Thousands/µL  --   --   --  3 84     Results from last 7 days   Lab Units 05/10/21  0511 05/09/21  0528 05/08/21  1219   SODIUM mmol/L 139 137 137   POTASSIUM mmol/L 4 5 4 4 5 0   CHLORIDE mmol/L 112* 110* 109*   CO2 mmol/L 20* 21 19*   ANION GAP mmol/L 7 6 9   BUN mg/dL 44* 43* 42*   CREATININE mg/dL 3 52* 3 57* 3 68*   EGFR ml/min/1 73sq m 12 12 12   CALCIUM mg/dL 8 2* 8 3 8 9   MAGNESIUM mg/dL  --  2 0  --    PHOSPHORUS mg/dL  --  3 4  --      Results from last 7 days   Lab Units 05/08/21  1318   AST U/L 16   ALT U/L 12   ALK PHOS U/L 85   TOTAL PROTEIN g/dL 6 2*   ALBUMIN g/dL 2 9*   TOTAL BILIRUBIN mg/dL 0 56   BILIRUBIN DIRECT mg/dL 0 17     Results from last 7 days   Lab Units 05/10/21  0511 05/09/21  0528 05/08/21  1219   GLUCOSE RANDOM mg/dL 99 91 100     Results from last 7 days   Lab Units 05/08/21  1219   CK TOTAL U/L 91     Results from last 7 days   Lab Units 05/08/21  1318   TROPONIN I ng/mL <0 02     Results from last 7 days   Lab Units 05/10/21  0842 05/08/21  1219   PROTIME seconds 15 9* 19 9*   INR  1 27* 1 70*   PTT seconds  --  34     Results from last 7 days   Lab Units 05/08/21  1338   CLARITY UA  Turbid   COLOR UA  Brown   SPEC GRAV UA  1 013   PH UA  Interference-unable to analyze*   GLUCOSE UA mg/dl Interference- unable to analyze   KETONES UA mg/dl Interference- unable to analyze*   BLOOD UA  Interference- unable to analyze*   PROTEIN UA mg/dl Interference- unable to analyze*   NITRITE UA  Interference- unable to analyze   BILIRUBIN UA  Interference- unable to analyze*   UROBILINOGEN UA E U /dl Interference-unable to analyze   LEUKOCYTES UA  Interference- unable to analyze*   WBC UA /hpf Innumerable*   RBC UA /hpf Innumerable*   BACTERIA UA /hpf Innumerable*   EPITHELIAL CELLS WET PREP /hpf Occasional     Results from last 7 days   Lab Units 05/08/21  1318   ACETAMINOPHEN LVL ug/mL <2*   SALICYLATE LVL mg/dL <3*     Results from last 7 days Lab Units 05/08/21  1338   URINE CULTURE  >100,000 cfu/ml      5/8  CT a/p=  New left hydronephrosis with perinephric stranding suggests the possibility of more acute obstruction and/or pyelonephritis  The degree of dilatation on the left is more similar to a study from August 2020  There is also dilated ureter extending to surgical sutures in the region of the ileal conduit suggesting possible stricture  No obvious stone is seen distally  Urologic evaluation is advised  Nonobstructing stones are seen in the right kidney  Diffuse dilatation of large and small bowel suggest ileus, more likely than obstruction, although the degree of small bowel dilatation is somewhat improved  Cyst is again noted in the right lower quadrant measuring up to 8 cm in size  This was noted as far back as 2015 although has slightly enlarged  Nonemergent focused ultrasound may be useful  L knee xray=  No acute osseous abnormality  Moderate tricompartmental osteoarthritis as evidenced by joint space narrowing, osteophyte formation and subchondral sclerosis    Ekg=  Normal sinus rhythm  Nonspecific T wave abnormality    Past Medical History:   Diagnosis Date    Anxiety     Bowel obstruction (HCC)     [facility] disease [facility]CKD), [facility] IV (severe) ([facility])     stage IV    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    Circulation problem     Compression fracture of cervical spine (HCC)     Hernia of abdominal cavity     History of kidney problems     Hydronephrosis     Hypertension     IBS (irritable bowel syndrome)     Incontinence     Lung mass     Improving on PET/CT 1/2016    Polycythemia vera ([address] )     Pulmonary embolism ([address] ) 2014    Shingles     Urinary tract infection      Present on Admission:   Obstructive uropathy   Chronic saddle pulmonary embolism (HCC)   Hyperlipemia   Stage 5 chronic kidney disease not on chronic dialysis (HCC)   Polycythemia vera ([address] )    Admitting Diagnosis: Hydronephrosis [N13 30]  Blood in urine [R31 9]  Hematuria [R31 9]  Age/Sex: 77 y o  female  Admission Orders:  Consult IR  Consult urology  Scd/foot pumps    Scheduled Medications:  atorvastatin, 40 mg, Oral, Daily With Dinner  calcitriol, 0 25 mcg, Oral, Every Other Day  cholecalciferol, 1,000 Units, Oral, Daily  citalopram, 20 mg, Oral, Daily  levothyroxine, 75 mcg, Oral, Early Morning  melatonin, 3 mg, Oral, HS  metoprolol tartrate, 25 mg, Oral, BID  ruxolitinib, 10 mg, Oral, Daily  saccharomyces boulardii, 250 mg, Oral, Daily    Continuous IV Infusions:  sodium chloride, 50 mL/hr, Intravenous, Continuous    PRN Meds:  acetaminophen, 650 mg, Oral, Q6H PRN    Network Utilization Review Department  ATTENTION: Please call with any questions or concerns to [phone] and carefully listen to the prompts so that you are directed to the right person  All voicemails are confidential   [place] Drop all requests for admission clinical reviews, approved or denied determinations and any other requests to dedicated fax number below belonging to the campus where the patient is receiving treatment   List of dedicated fax numbers for the Facilities:  [address] DENIALS (Administrative/Medical Necessity) [phone]   [address] (Maternity/NICU/Pediatrics) [phone]   [address] Neto Frey [address] [place] [address] [address] [facility] [facility] [phone]

## 2021-05-10 NOTE — ANESTHESIA PREPROCEDURE EVALUATION
Procedure:  IR NEPHROSTOMY TUBE PLACEMENT    Relevant Problems   CARDIO   (+) Chronic thrombosis of subclavian vein (HCC)   (+) Hyperlipemia   (+) Hypertension      ENDO   (+) Hypothyroidism   (+) Secondary hyperparathyroidism of renal origin ([facility] [address] )      GI/HEPATIC   (+) Small bowel obstruction (HCC)      /RENAL   (+) Hydronephrosis of left kidney   (+) Stage 5 chronic kidney disease not on chronic dialysis (HCC)      HEMATOLOGY   (+) Anemia in CKD (chronic kidney disease)      NEURO/PSYCH   (+) Depression   (+) History of Clostridioides difficile colitis   (+) History of shingles   (+) Intraventricular hemorrhage (HCC)      Other   (+) Polycythemia vera (HCC)             Anesthesia Plan  ASA Score- 3     Anesthesia Type- general with ASA Monitors  Additional Monitors:   Airway Plan: ETT  Plan Factors-    Chart reviewed  Induction- intravenous  Postoperative Plan-     Informed Consent- Anesthetic plan and risks discussed with patient  I personally reviewed this patient with the CRNA  Discussed and agreed on the Anesthesia Plan with the CRNA  Ozzy Wallowa

## 2021-05-10 NOTE — PLAN OF CARE
Problem: Potential for Falls  Goal: Patient will remain free of falls  Description: INTERVENTIONS:  - Assess patient frequently for physical needs  -  Identify cognitive and physical deficits and behaviors that affect risk of falls    -  [place] fall precautions as indicated by assessment   - Educate patient/family on patient safety including physical limitations  - Instruct patient to call for assistance with activity based on assessment  - Modify environment to reduce risk of injury  - Consider OT/PT consult to assist with strengthening/mobility  Outcome: Progressing     Problem: PAIN - ADULT  Goal: Verbalizes/displays adequate comfort level or baseline comfort level  Description: Interventions:  - Encourage patient to monitor pain and request assistance  - Assess pain using appropriate pain scale  - Administer analgesics based on type and severity of pain and evaluate response  - Implement non-pharmacological measures as appropriate and evaluate response  - Consider cultural and social influences on pain and pain management  - Notify physician/advanced practitioner if interventions unsuccessful or patient reports new pain  Outcome: Progressing     Problem: INFECTION - ADULT  Goal: Absence or prevention of progression during hospitalization  Description: INTERVENTIONS:  - Assess and monitor for signs and symptoms of infection  - Monitor lab/diagnostic results  - Monitor all insertion sites, i e  indwelling lines, tubes, and drains  - Monitor endotracheal if appropriate and nasal secretions for changes in amount and color  - [place] appropriate cooling/warming therapies per order  - Administer medications as ordered  - Instruct and encourage patient and family to use good hand hygiene technique  - Identify and instruct in appropriate isolation precautions for identified infection/condition  Outcome: Progressing  Goal: Absence of fever/infection during neutropenic period  Description: INTERVENTIONS:  - Monitor WBC    Outcome: Progressing     Problem: SAFETY ADULT  Goal: Patient will remain free of falls  Description: INTERVENTIONS:  - Assess patient frequently for physical needs  -  Identify cognitive and physical deficits and behaviors that affect risk of falls    -  [place] fall precautions as indicated by assessment   - Educate patient/family on patient safety including physical limitations  - Instruct patient to call for assistance with activity based on assessment  - Modify environment to reduce risk of injury  - Consider OT/PT consult to assist with strengthening/mobility  Outcome: Progressing  Goal: Maintain or return to baseline ADL function  Description: INTERVENTIONS:  -  Assess patient's ability to carry out ADLs; assess patient's baseline for ADL function and identify physical deficits which impact ability to perform ADLs (bathing, care of mouth/teeth, toileting, grooming, dressing, etc )  - Assess/evaluate cause of self-care deficits   - Assess range of motion  - Assess patient's mobility; develop plan if impaired  - Assess patient's need for assistive devices and provide as appropriate  - Encourage maximum independence but intervene and supervise when necessary  - Involve family in performance of ADLs  - Assess for home care needs following discharge   - Consider OT consult to assist with ADL evaluation and planning for discharge  - Provide patient education as appropriate  Outcome: Progressing  Goal: Maintain or return mobility status to optimal level  Description: INTERVENTIONS:  - Assess patient's baseline mobility status (ambulation, transfers, stairs, etc )    - Identify cognitive and physical deficits and behaviors that affect mobility  - Identify mobility aids required to assist with transfers and/or ambulation (gait belt, sit-to-stand, lift, walker, cane, etc )  - [place] fall precautions as indicated by assessment  - Record patient progress and toleration of activity level on Mobility SBAR; progress patient to next Phase/Stage  - Instruct patient to call for assistance with activity based on assessment  - Consider rehabilitation consult to assist with strengthening/weightbearing, etc   Outcome: Progressing     Problem: DISCHARGE PLANNING  Goal: Discharge to home or other facility with appropriate resources  Description: INTERVENTIONS:  - Identify barriers to discharge w/patient and caregiver  - Arrange for needed discharge resources and transportation as appropriate  - Identify discharge learning needs (meds, wound care, etc )  - Arrange for interpretive services to assist at discharge as needed  - Refer to Case Management Department for coordinating discharge planning if the patient needs post-hospital services based on physician/advanced practitioner order or complex needs related to functional status, cognitive ability, or social support system  Outcome: Progressing     Problem: Knowledge Deficit  Goal: Patient/family/caregiver demonstrates understanding of disease process, treatment plan, medications, and discharge instructions  Description: Complete learning assessment and assess knowledge base    Interventions:  - Provide teaching at level of understanding  - Provide teaching via preferred learning methods  Outcome: Progressing

## 2021-05-10 NOTE — PROGRESS NOTES
UROLOGY PROGRESS NOTE   Patient Identifiers: Padma Salazar (MRN [id number])  Date of Service: 5/10/2021    Subjective:    Resting comfortably  No complaints of pain  Urine appears to be clearing  H&H 8  0 and 26 1  Creatinine 3 52  INR 1 27  Patient has  no complaints        Objective:     VITALS:    Vitals:    05/10/21 0833   BP: 147/86   Pulse:    Resp:    Temp: 99 1 °F (37 3 °C)   SpO2:            LABS:  Lab Results   Component Value Date    HGB 8 0 (L) 05/10/2021    HCT 26 1 (L) 05/10/2021    WBC 5 17 05/10/2021     05/10/2021   ]    Lab Results   Component Value Date     12/17/2015    K 4 5 05/10/2021     (H) 05/10/2021    CO2 20 (L) 05/10/2021    BUN 44 (H) 05/10/2021    CREATININE 3 52 (H) 05/10/2021    CALCIUM 8 2 (L) 05/10/2021    GLUCOSE 138 10/04/2016   ]        INPATIENT MEDS:    Current Facility-Administered Medications:     acetaminophen (TYLENOL) tablet 650 mg, 650 mg, Oral, Q6H PRN, Kavin Upper sorbian, DO    atorvastatin (LIPITOR) tablet 40 mg, 40 mg, Oral, Daily With Dinner, [facility], DO, 40 mg at 05/09/21 1737    calcitriol (ROCALTROL) capsule 0 25 mcg, 0 25 mcg, Oral, Every Other Day, [facility], DO, 0 25 mcg at 05/10/21 0830    cholecalciferol (VITAMIN D3) tablet 1,000 Units, 1,000 Units, Oral, Daily, [facility], DO, 1,000 Units at 05/10/21 0830    citalopram (CeleXA) tablet 20 mg, 20 mg, Oral, Daily, [facility], DO, 20 mg at 05/10/21 0830    levothyroxine tablet 75 mcg, 75 mcg, Oral, Early Morning, [facility], DO, 75 mcg at 05/10/21 0509    melatonin tablet 3 mg, 3 mg, Oral, HS, Kavin Upper sorbian, DO, 3 mg at 05/09/21 2114    metoprolol tartrate (LOPRESSOR) tablet 25 mg, 25 mg, Oral, BID, Kavingeni Hackett DO, 25 mg at 05/10/21 0830    ruxolitinib (JAKAFI) tablet 10 mg, 10 mg, Oral, Daily, Manuelito Quinn MD, 10 mg at 05/10/21 0879    saccharomyces boulardii (FLORASTOR) capsule 250 mg, 250 mg, Oral, Daily, Kavin Hackett DO, 250 mg at 05/10/21 0869   sodium chloride 0 9 % infusion, 50 mL/hr, Intravenous, Continuous, Deidra Delarosa PA-C, Last Rate: 50 mL/hr at 05/09/21 2321, 50 mL/hr at 05/09/21 2321      Physical Exam:   /86   Pulse 71   Temp 99 1 °F (37 3 °C)   Resp 16   Ht 5' (1 524 m)   Wt 89 5 kg (197 lb 4 8 oz)   SpO2 92%   BMI 38 53 kg/m²   GEN: no acute distress    RESP: breathing comfortably with no accessory muscle use    ABD: soft, non-tender, non-distended   INCISION:    EXT: no significant peripheral edema   ILEAL CONDUIT: in place draining clear yellow urine  no clots,  minimal clots,  moderate clots and       RADIOLOGY:   CT ABDOMEN AND PELVIS WITHOUT IV CONTRAST   IMPRESSION:     New left hydronephrosis with perinephric stranding suggests the possibility of more acute obstruction and/or pyelonephritis  The degree of dilatation on the left is more similar to a study from August 2020  There is also dilated ureter extending to   surgical sutures in the region of the ileal conduit suggesting possible stricture  No obvious stone is seen distally  Urologicevaluation is advised  Nonobstructing stones are seen in the right kidney  Diffuse dilatation of large and small bowel suggest ileus, more likely than obstruction, although the degree of small bowel dilatation is somewhat improved  Follow-up is suggested  Cyst is again noted in the right lower quadrant measuring up to 8 cm in size  This was noted as far back as 2015 although has slightly enlarged  Nonemergent focused ultrasound may be useful  Assessment:   1  Gross hematuria  2  Left-sided hydronephrosis  3   Acute kidney injury    Plan:   -nephrostomy tube per IR pending  -urology will continue to follow  -  -

## 2021-05-10 NOTE — PLAN OF CARE
Problem: Potential for Falls  Goal: Patient will remain free of falls  Description: INTERVENTIONS:  - Assess patient frequently for physical needs  -  Identify cognitive and physical deficits and behaviors that affect risk of falls    -  [place] fall precautions as indicated by assessment   - Educate patient/family on patient safety including physical limitations  - Instruct patient to call for assistance with activity based on assessment  - Modify environment to reduce risk of injury  - Consider OT/PT consult to assist with strengthening/mobility  Outcome: Progressing     Problem: PAIN - ADULT  Goal: Verbalizes/displays adequate comfort level or baseline comfort level  Description: Interventions:  - Encourage patient to monitor pain and request assistance  - Assess pain using appropriate pain scale  - Administer analgesics based on type and severity of pain and evaluate response  - Implement non-pharmacological measures as appropriate and evaluate response  - Consider cultural and social influences on pain and pain management  - Notify physician/advanced practitioner if interventions unsuccessful or patient reports new pain  Outcome: Progressing     Problem: INFECTION - ADULT  Goal: Absence or prevention of progression during hospitalization  Description: INTERVENTIONS:  - Assess and monitor for signs and symptoms of infection  - Monitor lab/diagnostic results  - Monitor all insertion sites, i e  indwelling lines, tubes, and drains  - Monitor endotracheal if appropriate and nasal secretions for changes in amount and color  - [place] appropriate cooling/warming therapies per order  - Administer medications as ordered  - Instruct and encourage patient and family to use good hand hygiene technique  - Identify and instruct in appropriate isolation precautions for identified infection/condition  Outcome: Progressing  Goal: Absence of fever/infection during neutropenic period  Description: INTERVENTIONS:  - Monitor WBC    Outcome: Progressing     Problem: SAFETY ADULT  Goal: Patient will remain free of falls  Description: INTERVENTIONS:  - Assess patient frequently for physical needs  -  Identify cognitive and physical deficits and behaviors that affect risk of falls    -  [place] fall precautions as indicated by assessment   - Educate patient/family on patient safety including physical limitations  - Instruct patient to call for assistance with activity based on assessment  - Modify environment to reduce risk of injury  - Consider OT/PT consult to assist with strengthening/mobility  Outcome: Progressing  Goal: Maintain or return to baseline ADL function  Description: INTERVENTIONS:  -  Assess patient's ability to carry out ADLs; assess patient's baseline for ADL function and identify physical deficits which impact ability to perform ADLs (bathing, care of mouth/teeth, toileting, grooming, dressing, etc )  - Assess/evaluate cause of self-care deficits   - Assess range of motion  - Assess patient's mobility; develop plan if impaired  - Assess patient's need for assistive devices and provide as appropriate  - Encourage maximum independence but intervene and supervise when necessary  - Involve family in performance of ADLs  - Assess for home care needs following discharge   - Consider OT consult to assist with ADL evaluation and planning for discharge  - Provide patient education as appropriate  Outcome: Progressing  Goal: Maintain or return mobility status to optimal level  Description: INTERVENTIONS:  - Assess patient's baseline mobility status (ambulation, transfers, stairs, etc )    - Identify cognitive and physical deficits and behaviors that affect mobility  - Identify mobility aids required to assist with transfers and/or ambulation (gait belt, sit-to-stand, lift, walker, cane, etc )  - [place] fall precautions as indicated by assessment  - Record patient progress and toleration of activity level on Mobility SBAR; progress patient to next Phase/Stage  - Instruct patient to call for assistance with activity based on assessment  - Consider rehabilitation consult to assist with strengthening/weightbearing, etc   Outcome: Progressing     Problem: DISCHARGE PLANNING  Goal: Discharge to home or other facility with appropriate resources  Description: INTERVENTIONS:  - Identify barriers to discharge w/patient and caregiver  - Arrange for needed discharge resources and transportation as appropriate  - Identify discharge learning needs (meds, wound care, etc )  - Arrange for interpretive services to assist at discharge as needed  - Refer to Case Management Department for coordinating discharge planning if the patient needs post-hospital services based on physician/advanced practitioner order or complex needs related to functional status, cognitive ability, or social support system  Outcome: Progressing     Problem: Knowledge Deficit  Goal: Patient/family/caregiver demonstrates understanding of disease process, treatment plan, medications, and discharge instructions  Description: Complete learning assessment and assess knowledge base    Interventions:  - Provide teaching at level of understanding  - Provide teaching via preferred learning methods  Outcome: Progressing

## 2021-05-10 NOTE — INTERVAL H&P NOTE
Update: (This section must be completed if the H&P was completed greater than 24 hrs to procedure or admission)    H&P reviewed  After examining the patient, I find no changed to the H&P since it had been written  75F with renal failure prior cystectomy/conduit, left hydronephrosis  Decompression is indicated and must be percutaneous  Patient on blood thinners which have been held for hematuria  Risks benefits alternatives discussed she wishes to proceed we will give levofloxacin appreciate anesthesia support    Patient re-evaluated   Accept as history and physical     Jose L Branch MD/May 10, 2021/3:08 PM

## 2021-05-10 NOTE — ASSESSMENT & PLAN NOTE
Many years ago per patient  · Eliquis held due to hematuria and need for nephrostomy tube placement  · Will resume eliquis when cleared by IR/Urology

## 2021-05-10 NOTE — UTILIZATION REVIEW
Inpatient Admission Authorization Request   NOTIFICATION OF INPATIENT ADMISSION/INPATIENT AUTHORIZATION REQUEST   SERVICING FACILITY:   [facility]  Address: [address] [place] [address], [address] [place] [address]  Tax ID: [id number]  NPI: [id number]  Place of Service: Inpatient [address] [facility] Code: 24     ATTENDING PROVIDER:  Attending Name and NPI#: Zuleyma Johnsonflor [[id number]]  Address: [address] [place] [address], [address] [place] [id number]  Phone: [phone]     UTILIZATION REVIEW CONTACT:  Cheryl Morales, Utilization   Network Utilization Review Department  Phone: [phone]  Fax: [phone]  Email: [email or web address]     PHYSICIAN ADVISORY SERVICES:  FOR EERO-TD-OTAR REVIEW - MEDICAL NECESSITY DENIAL  Phone: [phone]  Fax: [phone]  Email: [email or web address]     TYPE OF REQUEST:  Inpatient Status     ADMISSION INFORMATION:  ADMISSION DATE/TIME: 5/8/21 1351  PATIENT DIAGNOSIS CODE/DESCRIPTION:  Hydronephrosis [N13 30]  Blood in urine [R31 9]  Hematuria [R31 9]  DISCHARGE DATE/TIME: No discharge date for patient encounter  DISCHARGE DISPOSITION (IF DISCHARGED): Home/Self Care     IMPORTANT INFORMATION:  Please contact the Cheryl Morales directly with any questions or concerns regarding this request  Department voicemails are confidential     Send requests for admission clinical reviews, concurrent reviews, approvals, and administrative denials due to lack of clinical to fax [phone]

## 2021-05-10 NOTE — CASE MANAGEMENT
LOS: 2  Not a bundle  Readmission risk: Yellow  Met with pt and discussed role of CM  Pt lives with her autistic son (currently being cared for by family while pt is hospitalized) in a 2-story home with 6 steps at entrance  Pt is independent in ADLs  No DME or prior HHC  Preference for pharmacy is [facility] [address]  No MH/D&A tx hx  PCP- Abebe Rand MD ([phone])  Pt reports her son Shani Ritchie as [place] ([phone])  Pt's son or neighbor will transport upon d/c     CM reviewed d/c planning process including the following: identifying help at home, patient preference for d/c planning needs, Discharge Lounge, [facility] Meds to Bed program, availability of treatment team to discuss questions or concerns patient and/or family may have regarding understanding medications and recognizing signs and symptoms once discharged  CM also encouraged patient to follow up with all recommended appointments after discharge  Patient advised of importance for patient and family to participate in managing patients medical well being  Due to the patient's high risk of readmission, "Access to Care" and "Care Now Facility" sheets were explained and given to the patient and/or caregiver

## 2021-05-10 NOTE — ASSESSMENT & PLAN NOTE
Patient presented to ED 05/08 for gross hematuria  · PMH of Stage 5 kidney CKD, obstructive uropathy, s/p cystectomy with ilial conduit  · CT abdomin pelvis: New left hydronephrosis with perinephric stranding   · Suggests the possibility of more acute obstruction and/or pyelonephritis  · Urology consulted and appreciated input  · IR following - L nephrostomy scheduled for today (05/10)  · Monitor H/H: presented with Hgb 8   Blood consent signed in case Hgb <7  · Her baseline hgb is 9-10  · Hgb currently stable (05/10)  · Urine cx with mixed contaminants x3  · No s/s of infection, afebrile, no leukocytosis, therefore hold abx

## 2021-05-10 NOTE — ANESTHESIA POSTPROCEDURE EVALUATION
Post-Op Assessment Note    CV Status:  Stable  Pain Score: 0    Pain management: adequate     Mental Status:  Awake   Hydration Status:  Stable   PONV Controlled:  None   Airway Patency:  Patent      Post Op Vitals Reviewed: Yes      Staff: CRNA         No complications documented      BP   116/86   Temp   97 3F   Pulse  83   Resp   18   SpO2   100% 6L FM

## 2021-05-10 NOTE — APP STUDENT NOTE
BRODERICK STUDENT  Inpatient Progress Note for TRAINING ONLY  Not Part of Legal Medical Record     Progress Note - Britany Shahid 77 y o  female MRN: [id number]  Unit/Bed#: [facility] 803-01 Encounter: [id number]        * Gross hematuria  Assessment & Plan  · Patient presented to ED 05/08 for gross hematuria  · PMH of Stage 5 kidney CKD, obstructive uropathy, s/p cystectomy with ilial conduit  · CT abdomin pelvis: New left hydronephrosis with perinephric stranding   · Suggests the possibility of more acute obstruction and/or pyelonephritis  · Urology consulted and appreciated input  · IR following - L nephrostomy scheduled for today (05/10)  · Monitor H/H: presented with Hgb 8  Blood consent signed in case Hgb <7  · Her baseline hgb is 9-10  · Hgb currently stable (05/10)  · F/u urine culture  · No s/s of infection, afebrile, no leukocytosis, therefore hold abx    Hydronephrosis of left kidney  Assessment & Plan  · IR and urology consulted  · Plan for perc neph possibly Monday or Tuesday when off Eliquis and ASA  · See above    Stage 5 chronic kidney disease not on chronic dialysis [facility])  Assessment & Plan  Lab Results   Component Value Date    EGFR 12 05/10/2021    EGFR 12 05/09/2021    EGFR 12 05/08/2021    CREATININE 3 52 (H) 05/10/2021    CREATININE 3 57 (H) 05/09/2021    CREATININE 3 68 (H) 05/08/2021   Estimated Creatinine Clearance: 14 mL/min (A) (by C-G formula based on SCr of 3 52 mg/dL (H))    · Monitor Cr  · Cr near baseline 3 4-3 5  · Gentle NS  · Likely will improve with nephrostomy tube    Polycythemia vera (HCC)  Assessment & Plan  · Hgb is stable currently  · Patient follows up with outpatient Hematology Oncology, currently on Jakafi 10 mg Oral Daily  · Patient brought this medication from home, will order    Obstructive uropathy  Assessment & Plan  · S/p ileostomy for nonfunctioning bladder and chronic hydro  · Urology consult    Chronic saddle pulmonary embolism (HCC)  Assessment & Plan  · Low dose Eliquis held due to ongoing hematuria  · Continues to have gross hematuria, will hold DVT prophylaxis  · Procedure scheduled today - continue to hold pharm DVT prophylaxis         VTE Pharmacologic Prophylaxis:   Pharmacologic: Held due to procedure  Mechanical VTE Prophylaxis in Place: Yes    Patient Centered Rounds: I have performed bedside rounds with nursing staff today  Discussions with Specialists or Other Care Team Provider: RN    Education and Discussions with Family / Patient: Patient    Time Spent for Care: 30 minutes  More than 50% of total time spent on counseling and coordination of care as described above  Current Length of Stay: 2 day(s)    Current Patient Status: Inpatient   Certification Statement: The patient will continue to require additional inpatient hospital stay due to nephrostomy    Discharge Plan: None yet    Code Status: Level 1 - Full Code    Subjective:   Patient denies pain, SOB  Scheduled for nephrostomy today  Objective:     Vitals:   Temp (24hrs), Av 6 °F (37 6 °C), Min:99 1 °F (37 3 °C), Max:99 9 °F (37 7 °C)    Temp:  [99 1 °F (37 3 °C)-99 9 °F (37 7 °C)] 99 1 °F (37 3 °C)  Resp:  [16-18] 16  BP: (146-156)/(75-91) 147/86  Body mass index is 38 53 kg/m²  Input and Output Summary (last 24 hours): Intake/Output Summary (Last 24 hours) at 5/10/2021 0947  Last data filed at 5/10/2021 0601  Gross per 24 hour   Intake 1178 33 ml   Output 1650 ml   Net -471 67 ml       Physical Exam:     Physical Exam  Constitutional:       Appearance: She is obese  HENT:      Head: Normocephalic and atraumatic  Cardiovascular:      Rate and Rhythm: Normal rate and regular rhythm  Pulses: Normal pulses  Heart sounds: Normal heart sounds  Pulmonary:      Effort: Pulmonary effort is normal       Breath sounds: Normal breath sounds  Skin:     General: Skin is warm and dry  Neurological:      Mental Status: She is alert and oriented to person, place, and time  Psychiatric:         Mood and Affect: Mood normal          Behavior: Behavior normal            Historical Information   Past Medical History:   Diagnosis Date    Anxiety     Bowel obstruction (HCC)     Chronic kidney disease (CKD), stage IV (severe) (HCC)     stage IV    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    Circulation problem     Compression fracture of cervical spine (HCC)     Hernia of abdominal cavity     History of kidney problems     Hydronephrosis     Hypertension     IBS (irritable bowel syndrome)     Incontinence     Lung mass     Improving on PET/CT 1/2016    [facility]cy[facility]mia vera ([facility] [address] )     Pulmonary embolism ([facility] [address] ) 2014    Shingles     Urinary tract infection      Past Surgical History:   Procedure Laterality Date    ABDOMINAL ADHESION SURGERY N/A 8/9/2020    Procedure: LYSIS ADHESIONS;  Surgeon: Aubrie Dixon DO;  [facility];  Service: General    BLADDER SUSPENSION      BOTOX INJECTION N/A 7/27/2016    Procedure: BOTOX INJECTION ;  Surgeon: Kat Meléndez MD;  [facility];  Service:     CHOLECYSTECTOMY N/A     COLONOSCOPY      CYSTECTOMY, RADICAL WITH ILEOCONDUIT N/A 10/4/2016    Procedure: SUPRATRIGONAL CYSTECTOMY WITH ILEAL CONDUIT ;  Surgeon: Kat Meléndez MD;  [facility];  Service:    Alycia Go W/ RETROGRADES Bilateral 7/27/2016    Procedure: CYSTOSCOPY; RETROGRADE PYELOGRAM ;  Surgeon: Kat Meléndez MD;  [facility];  Service:     HERNIA REPAIR      IR AV FISTULAGRAM/GRAFTOGRAM  2/10/2021    IR NON-TUNNELED CENTRAL LINE PLACEMENT  7/17/2020    IR NON-TUNNELED CENTRAL LINE PLACEMENT  8/14/2020    LAPAROTOMY N/A 8/9/2020    Procedure: LAPAROTOMY EXPLORATORY, PARASTOMAL HERNIA REPAIR WITH MESH;  Surgeon: Aubrie Dixon DO;  [facility];  Service: General    IN ANASTOMOSIS,AV,ANY SITE Right 1/5/2021    Procedure: Creation of right brachiobasilic fistula;   Surgeon: Natale Olszewski, MD;  [facility];  Service: Vascular    CA COLONOSCOPY FLX DX W/COLLJ SPEC WHEN PFRMD N/A 8/31/2016    Procedure: COLONOSCOPY;  Surgeon: Maria Ines Dinero MD;  [facility]; Service: Gastroenterology    CA CYSTOSCOPY,INSERT URETERAL STENT Bilateral 7/27/2016    Procedure: STENT INSERTION; EXCISION OF MESH ;  Surgeon: Abdiaziz Bryant MD;  [facility];  Service: Urology    TONSILLECTOMY      TUBAL LIGATION      WISDOM TOOTH EXTRACTION       Social History   Social History     Substance and Sexual Activity   Alcohol Use Not Currently    Frequency: Never    Binge frequency: Never    Comment: n/s     Social History     Substance and Sexual Activity   Drug Use Not Currently    Comment: n/a     Social History     Tobacco Use   Smoking Status Never Smoker   Smokeless Tobacco Never Used   Tobacco Comment    n/a     Family History:   Family History   Problem Relation Age of Onset    Cancer Mother         small cell cancer     Heart disease Father     COPD Father     Heart disease Brother     Nephrolithiasis Brother        Meds/Allergies   all medications and allergies reviewed  Allergies   Allergen Reactions    Chlorhexidine Rash     petichi like rash when using chlorhexidine swabs prior to IV  Additional Data:     Labs:    Results from last 7 days   Lab Units 05/10/21  0511  05/08/21  1219   WBC Thousand/uL 5 17   < > 5 35   HEMOGLOBIN g/dL 8 0*   < > 8 7*   HEMATOCRIT % 26 1*   < > 27 8*   PLATELETS Thousands/uL 347   < > 395*   NEUTROS PCT %  --   --  70   LYMPHS PCT %  --   --  16   MONOS PCT %  --   --  11   EOS PCT %  --   --  1    < > = values in this interval not displayed       Results from last 7 days   Lab Units 05/10/21  0511  05/08/21  1318   SODIUM mmol/L 139   < >  --    POTASSIUM mmol/L 4 5   < >  --    CHLORIDE mmol/L 112*   < >  --    CO2 mmol/L 20*   < >  --    BUN mg/dL 44*   < >  --    CREATININE mg/dL 3 52*   < >  --    ANION GAP mmol/L 7   < >  --    CALCIUM mg/dL 8 2*   < >  -- ALBUMIN g/dL  --   --  2 9*   TOTAL BILIRUBIN mg/dL  --   --  0 56   ALK PHOS U/L  --   --  85   ALT U/L  --   --  12   AST U/L  --   --  16   GLUCOSE RANDOM mg/dL 99   < >  --     < > = values in this interval not displayed  Results from last 7 days   Lab Units 05/10/21  0842   INR  1 27*                     * I Have Reviewed All Lab Data Listed Above  * Additional Pertinent Lab Tests Reviewed: [address] Admission Reviewed    Imaging:    Imaging Reports Reviewed Today Include: CT abdomin pelvis    Recent Cultures (last 7 days):     Results from last 7 days   Lab Units 05/08/21  1338   URINE CULTURE  >100,000 cfu/ml        Last 24 Hours Medication List:   Current Facility-Administered Medications   Medication Dose Route Frequency Provider Last Rate    acetaminophen  650 mg Oral Q6H PRN Clarene Richard, DO      atorvastatin  40 mg Oral Daily With [place] [facility], DO      calcitriol  0 25 mcg Oral Every Other Day Clarene [place], DO      cholecalciferol  1,000 Units Oral Daily Clarene Richard, DO      citalopram  20 mg Oral Daily Clarene Richard, DO      levothyroxine  75 mcg Oral Early Morning Clarene [place], DO      melatonin  3 mg Oral HS Clarene [place], DO      metoprolol tartrate  25 mg Oral BID Clarene [place], DO      ruxolitinib  10 mg Oral Daily Laura Walter MD      saccharomyces boulardii  250 mg Oral Daily Clarene Richard, DO      sodium chloride  50 mL/hr Intravenous Continuous [place], PA-C 50 mL/hr (05/09/21 2467)        Today, Patient Was Seen By: Norina Moritz    ** Please Note: Dictation voice to text software may have been used in the creation of this document   **

## 2021-05-10 NOTE — ASSESSMENT & PLAN NOTE
Lab Results   Component Value Date    EGFR 12 05/10/2021    EGFR 12 05/09/2021    EGFR 12 05/08/2021    CREATININE 3 52 (H) 05/10/2021    CREATININE 3 57 (H) 05/09/2021    CREATININE 3 68 (H) 05/08/2021   Estimated Creatinine Clearance: 14 mL/min (A) (by C-G formula based on SCr of 3 52 mg/dL (H))    Cr near baseline 3 4-3 5, follows with Dr Nancy Moreno  · Likely will improve with nephrostomy tube  · Monitor BMP

## 2021-05-10 NOTE — ASSESSMENT & PLAN NOTE
Hgb currently stable   · Patient follows up with outpatient Hematology Oncology, currently on Jakafi 10 mg Oral Daily  · Brought medication in from home

## 2021-05-10 NOTE — PROGRESS NOTES
[facility]  Progress Note - Jody Darden [birth year], 77 y o  female MRN: [id number]  Unit/Bed#: [facility] 803-01 Encounter: [id number]  Primary Care Provider: Moni Brasher MD   Date and time admitted to hospital: 5/8/2021 11:18 AM    * Gross hematuria  Assessment & Plan  Patient presented to ED 05/08 for gross hematuria  · PMH of Stage 5 kidney CKD, obstructive uropathy, s/p cystectomy with ilial conduit  · CT abdomin pelvis: New left hydronephrosis with perinephric stranding   · Suggests the possibility of more acute obstruction and/or pyelonephritis  · Urology consulted and appreciated input  · IR following - L nephrostomy scheduled for today (05/10)  · Monitor H/H: presented with Hgb 8  Blood consent signed in case Hgb <7  · Her baseline hgb is 9-10  · Hgb currently stable (05/10)  · Urine cx with mixed contaminants x3  · No s/s of infection, afebrile, no leukocytosis, therefore hold abx    Hydronephrosis of left kidney  Assessment & Plan  IR and urology consulted  · Plan for perc neph today  · See above    Chronic saddle pulmonary embolism ([address] )  Assessment & Plan  Many years ago per patient  · Eliquis held due to hematuria and need for nephrostomy tube placement  · Will resume eliquis when cleared by IR/Urology     Hyperlipemia  Assessment & Plan  · Continue statin    Stage 5 chronic kidney disease not on chronic dialysis [facility])  Assessment & Plan  Lab Results   Component Value Date    EGFR 12 05/10/2021    EGFR 12 05/09/2021    EGFR 12 05/08/2021    CREATININE 3 52 (H) 05/10/2021    CREATININE 3 57 (H) 05/09/2021    CREATININE 3 68 (H) 05/08/2021   Estimated Creatinine Clearance: 14 mL/min (A) (by C-G formula based on SCr of 3 52 mg/dL (H))    Cr near baseline 3 4-3 5, follows with Dr Sary Jimenez  · Likely will improve with nephrostomy tube  · Monitor BMP     Polycythemia vera ([facility] [address] )  Assessment & Plan  Hgb currently stable   · Patient follows up with outpatient Hematology Oncology, currently on Jakafi 10 mg Oral Daily  · Brought medication in from home     Obstructive uropathy  Assessment & Plan  S/p ileostomy for nonfunctioning bladder and chronic hydro  · Urology following         VTE Pharmacologic Prophylaxis: VTE Score: 6 Moderate Risk (Score 3-4) - Pharmacological DVT Prophylaxis Contraindicated  Sequential Compression Devices Ordered  Patient Centered Rounds: I performed bedside rounds with nursing staff today  Discussions with Specialists or Other Care Team Provider: IR    Education and Discussions with Family / Patient: Patient declined call to   Time Spent for Care: 30 minutes  More than 50% of total time spent on counseling and coordination of care as described above  Current Length of Stay: 2 day(s)  Current Patient Status: Inpatient   Certification Statement: The patient will continue to require additional inpatient hospital stay due to IR procedure   Discharge Plan: Anticipate discharge in 48 hrs to home  Code Status: Level 1 - Full Code    Subjective:   Doing okay  Has no complaints today  Objective:     Vitals:   Temp (24hrs), Av 6 °F (37 6 °C), Min:99 1 °F (37 3 °C), Max:99 9 °F (37 7 °C)    Temp:  [99 1 °F (37 3 °C)-99 9 °F (37 7 °C)] 99 1 °F (37 3 °C)  Resp:  [16-18] 16  BP: (146-156)/(75-91) 147/86  Body mass index is 38 53 kg/m²  Input and Output Summary (last 24 hours): Intake/Output Summary (Last 24 hours) at 5/10/2021 1125  Last data filed at 5/10/2021 0601  Gross per 24 hour   Intake 1178 33 ml   Output 1650 ml   Net -471 67 ml       Physical Exam:   Physical Exam  Vitals signs and nursing note reviewed  Constitutional:       General: She is not in acute distress  Appearance: She is obese  Cardiovascular:      Rate and Rhythm: Normal rate and regular rhythm  Pulmonary:      Effort: No respiratory distress  Abdominal:      General: There is no distension     Genitourinary:     Comments: Ileal conduit noted with black nohemy urine   Skin:     Coloration: Skin is pale  Neurological:      Mental Status: She is oriented to person, place, and time  Additional Data:     Labs:  Results from last 7 days   Lab Units 05/10/21  0511  05/08/21  1219   WBC Thousand/uL 5 17   < > 5 35   HEMOGLOBIN g/dL 8 0*   < > 8 7*   HEMATOCRIT % 26 1*   < > 27 8*   PLATELETS Thousands/uL 347   < > 395*   NEUTROS PCT %  --   --  70   LYMPHS PCT %  --   --  16   MONOS PCT %  --   --  11   EOS PCT %  --   --  1    < > = values in this interval not displayed  Results from last 7 days   Lab Units 05/10/21  0511  05/08/21  1318   SODIUM mmol/L 139   < >  --    POTASSIUM mmol/L 4 5   < >  --    CHLORIDE mmol/L 112*   < >  --    CO2 mmol/L 20*   < >  --    BUN mg/dL 44*   < >  --    CREATININE mg/dL 3 52*   < >  --    ANION GAP mmol/L 7   < >  --    CALCIUM mg/dL 8 2*   < >  --    ALBUMIN g/dL  --   --  2 9*   TOTAL BILIRUBIN mg/dL  --   --  0 56   ALK PHOS U/L  --   --  85   ALT U/L  --   --  12   AST U/L  --   --  16   GLUCOSE RANDOM mg/dL 99   < >  --     < > = values in this interval not displayed       Results from last 7 days   Lab Units 05/10/21  0842   INR  1 27*                   Lines/Drains:  Invasive Devices     Peripheral Intravenous Line            Peripheral IV 05/08/21 Left Hand 1 day          Drain            Urostomy Ileal conduit RUQ 1678 days                      Imaging: reviewed all    Recent Cultures (last 7 days):   Results from last 7 days   Lab Units 05/08/21  1338   URINE CULTURE  >100,000 cfu/ml        Last 24 Hours Medication List:   Current Facility-Administered Medications   Medication Dose Route Frequency Provider Last Rate    acetaminophen  650 mg Oral Q6H PRN Sami Castrejon DO      atorvastatin  40 mg Oral Daily With [place] [facility], DO      calcitriol  0 25 mcg Oral Every Other Day Sami Castrejon DO      cholecalciferol  1,000 Units Oral Daily Sami Castrejon DO      citalopram  20 mg Oral Daily Navid Franz DO      levothyroxine  75 mcg Oral Early Morning Navid Franz DO      melatonin  3 mg Oral HS Navid Franz DO      metoprolol tartrate  25 mg Oral BID Navid Franz DO      ruxolitinib  10 mg Oral Daily Jose Simpson MD      saccharomyces boulardii  250 mg Oral Daily Navid Franz DO          Today, Patient Was Seen By: Tessa Quinn PA-C    **Please Note: This note may have been constructed using a voice recognition system  **

## 2021-05-10 NOTE — BRIEF OP NOTE (RAD/CATH)
INTERVENTIONAL RADIOLOGY PROCEDURE NOTE    Date: 5/10/2021    Procedure: IR NEPHROSTOMY TUBE PLACEMENT    Preoperative diagnosis:   1  Hematuria    2  Hydronephrosis         Postoperative diagnosis: Same  Surgeon: Tejal Bourne MD     Assistant: None  No qualified resident was available  Blood loss: MINIMAL    Specimens: bloody fluid for culture     Findings:   LEFT hydronephrosis in an atrophic kidney  Blood in the collecting system  ? Possible duplicated system but difficult to assess due to old blood products  Opacification of the ureters to the conduit    Complications: None immediate    Antibiotics: levofloxacin  Anesthesia: general

## 2021-05-11 PROBLEM — D64.9 ANEMIA: Status: ACTIVE | Noted: 2021-05-11

## 2021-05-11 LAB
ANION GAP SERPL CALCULATED.3IONS-SCNC: 8 MMOL/L (ref 4–13)
BACTERIA UR CULT: NORMAL
BASOPHILS # BLD AUTO: 0.03 THOUSANDS/ΜL (ref 0–0.1)
BASOPHILS NFR BLD AUTO: 0 % (ref 0–1)
BUN SERPL-MCNC: 38 MG/DL (ref 5–25)
CALCIUM SERPL-MCNC: 8.3 MG/DL (ref 8.3–10.1)
CHLORIDE SERPL-SCNC: 112 MMOL/L (ref 100–108)
CO2 SERPL-SCNC: 19 MMOL/L (ref 21–32)
CREAT SERPL-MCNC: 3.62 MG/DL (ref 0.6–1.3)
EOSINOPHIL # BLD AUTO: 0.01 THOUSAND/ΜL (ref 0–0.61)
EOSINOPHIL NFR BLD AUTO: 0 % (ref 0–6)
ERYTHROCYTE [DISTWIDTH] IN BLOOD BY AUTOMATED COUNT: 13.9 % (ref 11.6–15.1)
GFR SERPL CREATININE-BSD FRML MDRD: 12 ML/MIN/1.73SQ M
GLUCOSE SERPL-MCNC: 100 MG/DL (ref 65–140)
HCT VFR BLD AUTO: 24.7 % (ref 34.8–46.1)
HGB BLD-MCNC: 7.5 G/DL (ref 11.5–15.4)
IMM GRANULOCYTES # BLD AUTO: 0.16 THOUSAND/UL (ref 0–0.2)
IMM GRANULOCYTES NFR BLD AUTO: 2 % (ref 0–2)
INR PPP: 1.39 (ref 0.84–1.19)
LYMPHOCYTES # BLD AUTO: 0.51 THOUSANDS/ΜL (ref 0.6–4.47)
LYMPHOCYTES NFR BLD AUTO: 6 % (ref 14–44)
MCH RBC QN AUTO: 29.3 PG (ref 26.8–34.3)
MCHC RBC AUTO-ENTMCNC: 30.4 G/DL (ref 31.4–37.4)
MCV RBC AUTO: 97 FL (ref 82–98)
MONOCYTES # BLD AUTO: 0.56 THOUSAND/ΜL (ref 0.17–1.22)
MONOCYTES NFR BLD AUTO: 6 % (ref 4–12)
NEUTROPHILS # BLD AUTO: 7.44 THOUSANDS/ΜL (ref 1.85–7.62)
NEUTS SEG NFR BLD AUTO: 86 % (ref 43–75)
NRBC BLD AUTO-RTO: 0 /100 WBCS
PLATELET # BLD AUTO: 353 THOUSANDS/UL (ref 149–390)
PMV BLD AUTO: 9.7 FL (ref 8.9–12.7)
POTASSIUM SERPL-SCNC: 4.7 MMOL/L (ref 3.5–5.3)
PROTHROMBIN TIME: 17 SECONDS (ref 11.6–14.5)
RBC # BLD AUTO: 2.56 MILLION/UL (ref 3.81–5.12)
SODIUM SERPL-SCNC: 139 MMOL/L (ref 136–145)
WBC # BLD AUTO: 8.71 THOUSAND/UL (ref 4.31–10.16)

## 2021-05-11 PROCEDURE — 85610 PROTHROMBIN TIME: CPT | Performed by: INTERNAL MEDICINE

## 2021-05-11 PROCEDURE — 99232 SBSQ HOSP IP/OBS MODERATE 35: CPT | Performed by: PHYSICIAN ASSISTANT

## 2021-05-11 PROCEDURE — 99222 1ST HOSP IP/OBS MODERATE 55: CPT | Performed by: INTERNAL MEDICINE

## 2021-05-11 PROCEDURE — 99232 SBSQ HOSP IP/OBS MODERATE 35: CPT | Performed by: NURSE PRACTITIONER

## 2021-05-11 PROCEDURE — 80048 BASIC METABOLIC PNL TOTAL CA: CPT | Performed by: INTERNAL MEDICINE

## 2021-05-11 PROCEDURE — 99232 SBSQ HOSP IP/OBS MODERATE 35: CPT | Performed by: INTERNAL MEDICINE

## 2021-05-11 PROCEDURE — 85025 COMPLETE CBC W/AUTO DIFF WBC: CPT | Performed by: INTERNAL MEDICINE

## 2021-05-11 RX ORDER — SIMETHICONE 80 MG
80 TABLET,CHEWABLE ORAL EVERY 6 HOURS PRN
Status: DISCONTINUED | OUTPATIENT
Start: 2021-05-11 | End: 2021-05-16 | Stop reason: HOSPADM

## 2021-05-11 RX ORDER — DOCUSATE SODIUM 100 MG/1
100 CAPSULE, LIQUID FILLED ORAL 2 TIMES DAILY
Status: DISCONTINUED | OUTPATIENT
Start: 2021-05-11 | End: 2021-05-16 | Stop reason: HOSPADM

## 2021-05-11 RX ORDER — POLYETHYLENE GLYCOL 3350 17 G/17G
17 POWDER, FOR SOLUTION ORAL DAILY
Status: DISCONTINUED | OUTPATIENT
Start: 2021-05-11 | End: 2021-05-12

## 2021-05-11 RX ORDER — SODIUM BICARBONATE 650 MG/1
650 TABLET ORAL
Status: DISCONTINUED | OUTPATIENT
Start: 2021-05-11 | End: 2021-05-13

## 2021-05-11 RX ORDER — BISACODYL 10 MG
10 SUPPOSITORY, RECTAL RECTAL DAILY PRN
Status: DISCONTINUED | OUTPATIENT
Start: 2021-05-11 | End: 2021-05-12

## 2021-05-11 RX ADMIN — METOPROLOL TARTRATE 25 MG: 25 TABLET, FILM COATED ORAL at 16:56

## 2021-05-11 RX ADMIN — DOCUSATE SODIUM 100 MG: 100 CAPSULE ORAL at 16:56

## 2021-05-11 RX ADMIN — DOCUSATE SODIUM 100 MG: 100 CAPSULE ORAL at 09:11

## 2021-05-11 RX ADMIN — RUXOLITINIB 10 MG: 10 TABLET ORAL at 09:12

## 2021-05-11 RX ADMIN — SODIUM BICARBONATE 650 MG TABLET 650 MG: at 16:56

## 2021-05-11 RX ADMIN — POLYETHYLENE GLYCOL 3350 17 G: 17 POWDER, FOR SOLUTION ORAL at 09:12

## 2021-05-11 RX ADMIN — CEFTRIAXONE 1000 MG: 2 INJECTION, POWDER, FOR SOLUTION INTRAMUSCULAR; INTRAVENOUS at 10:29

## 2021-05-11 RX ADMIN — SODIUM BICARBONATE 650 MG TABLET 650 MG: at 12:35

## 2021-05-11 RX ADMIN — LEVOTHYROXINE SODIUM 75 MCG: 75 TABLET ORAL at 05:15

## 2021-05-11 RX ADMIN — ATORVASTATIN CALCIUM 40 MG: 40 TABLET, FILM COATED ORAL at 16:57

## 2021-05-11 RX ADMIN — CITALOPRAM HYDROBROMIDE 20 MG: 20 TABLET ORAL at 09:12

## 2021-05-11 RX ADMIN — Medication 1000 UNITS: at 09:12

## 2021-05-11 RX ADMIN — MELATONIN TAB 3 MG 3 MG: 3 TAB at 21:21

## 2021-05-11 RX ADMIN — METOPROLOL TARTRATE 25 MG: 25 TABLET, FILM COATED ORAL at 09:11

## 2021-05-11 RX ADMIN — Medication 250 MG: at 09:11

## 2021-05-11 RX ADMIN — SIMETHICONE 80 MG: 80 TABLET, CHEWABLE ORAL at 13:05

## 2021-05-11 NOTE — ASSESSMENT & PLAN NOTE
Patient follows up with outpatient Hematology Oncology, currently on Jakafi 10 mg Oral Daily  · Brought medication in from home   · Monitor labs

## 2021-05-11 NOTE — ASSESSMENT & PLAN NOTE
Patient presented to ED for gross hematuria  · PMH of Stage 5 kidney CKD, obstructive uropathy, s/p cystectomy with ilial conduit  · CT abdomin pelvis: New left hydronephrosis with perinephric stranding   · Suggests the possibility of more acute obstruction and/or pyelonephritis     · Urology consulted and appreciated input  · IR following, s/p L nephrostomy tube placement on 5/11 with Dr Maxine Rabago (IR)  · Monitor H/H, transfuse PRN  · Urine cx pre-operatively with mixed contaminants x3  · During IR procedure was noted to have purulent urine, started on abx   · Holding eliquis and asa per urology/IR

## 2021-05-11 NOTE — PROGRESS NOTES
Progress Note - Urology  El Chamberlain 1945, 77 y o  female MRN: [id number]    Unit/Bed#: [facility] 803-01 Encounter: [id number]    Assessment and Plan:  1  Left-sided hydronephrosis  - S/p left nephrostomy tube placement    2  Acute kidney injury  - Creatinine today 3 62  - Continue to monitor creatinine  - Continue hydration    3  Gross hematuria  - Monitor H/H, transfuse PRN  - Continue to hold eliquis and ASA     Subjective:   HPI: El Chamberlain is a 77year old female now s/p left nephrostomy tube placement  Today she is resting comfortably, no complaints of pain  H&H 7 5 and 24 7  Creatinine today 3 62, previously 3 52  Review of Systems   Constitutional: Negative for activity change, appetite change, chills and fever  HENT: Negative for congestion and trouble swallowing  Respiratory: Negative for cough and shortness of breath  Cardiovascular: Negative for chest pain, palpitations and leg swelling  Gastrointestinal: Negative for abdominal pain, constipation, diarrhea, nausea and vomiting  Genitourinary: Negative for flank pain  Musculoskeletal: Negative for back pain and gait problem  Skin: Negative for wound  Allergic/Immunologic: Negative for immunocompromised state  Neurological: Negative for dizziness and syncope  Hematological: Does not bruise/bleed easily  Psychiatric/Behavioral: Negative for confusion  All other systems reviewed and are negative  Objective:  Nursing Rounds:   Vitals: Blood pressure 122/66, pulse 86, temperature 99 8 °F (37 7 °C), temperature source Oral, resp  rate 18, height 5' (1 524 m), weight 89 5 kg (197 lb 4 8 oz), SpO2 96 %, not currently breastfeeding  ,Body mass index is 38 53 kg/m²  Physical Exam  Constitutional:       Appearance: Normal appearance  HENT:      Head: Normocephalic  Pulmonary:      Effort: Pulmonary effort is normal    Genitourinary:     Comments: L PCN in place  Skin:     General: Skin is warm and dry  Neurological:      General: No focal deficit present  Mental Status: She is alert and oriented to person, place, and time  Psychiatric:         Mood and Affect: Mood normal          Behavior: Behavior normal          Imaging:    Imaging reviewed - both report and images personally reviewed  Labs:  Recent Labs     05/08/21  1219 05/09/21  0528 05/10/21  0511 05/11/21  0441   WBC 5 35 4 70 5 17 8 71       Recent Labs     05/08/21  1219 05/08/21  1831 05/09/21  0528 05/10/21  0511 05/11/21  0441   HGB 8 7* 7 9* 8 0* 8 0* 7 5*     Recent Labs     05/08/21  1219 05/08/21  1831 05/09/21  0528 05/10/21  0511 05/11/21  0441   HCT 27 8* 25 7* 25 8* 26 1* 24 7*     Recent Labs     05/08/21  1219 05/09/21  0528 05/10/21  0511 05/11/21  0441   CREATININE 3 68* 3 57* 3 52* 3 62*       History:    Past Medical History:   Diagnosis Date    Anxiety     Bowel obstruction (HCC)     Chronic kidney disease (CKD), stage IV (severe) (HCC)     stage IV    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    Circulation problem     Compression fracture of cervical spine (HCC)     Hernia of abdominal cavity     History of kidney problems     Hydronephrosis     Hypertension     IBS (irritable bowel syndrome)     Incontinence     Lung mass     Improving on PET/CT 1/2016    Polycy[facility]mia vera ([facility] [address]     Pulmonary embolism ([facility] [address] ) 2014    Shingles     Urinary tract infection      Social History     Socioeconomic History    Marital status:       Spouse name: None    Number of children: None    Years of education: None    Highest education level: None   Occupational History    None   Social Needs    Financial resource strain: None    Food insecurity     Worry: None     Inability: None    Transportation needs     Medical: None     Non-medical: None   Tobacco Use    Smoking status: Never Smoker    Smokeless tobacco: Never Used    Tobacco comment: n/a   Substance and Sexual Activity    Alcohol use: Not Currently     Frequency: Never     Binge frequency: Never     Comment: n/s    Drug use: Not Currently     Comment: n/a    Sexual activity: Not Currently     Partners: Male   Lifestyle    Physical activity     Days per week: None     Minutes per session: None    Stress: None   Relationships    Social connections     Talks on phone: None     Gets together: None     Attends Anabaptism service: None     Active member of club or organization: None     Attends meetings of clubs or organizations: None     Relationship status: None    Intimate partner violence     Fear of current or ex partner: None     Emotionally abused: None     Physically abused: None     Forced sexual activity: None   Other Topics Concern    None   Social History Narrative    None     Past Surgical History:   Procedure Laterality Date    ABDOMINAL ADHESION SURGERY N/A 8/9/2020    Procedure: LYSIS ADHESIONS;  Surgeon: Tre Barker DO;  [facility];  Service: General    BLADDER SUSPENSION      BOTOX INJECTION N/A 7/27/2016    Procedure: BOTOX INJECTION ;  Surgeon: Lola William MD;  [facility];  Service:    Kave[address], RADICAL WITH ILEOCONDUIT N/A 10/4/2016    Procedure: SUPRATRIGONAL CYSTECTOMY WITH ILEAL CONDUIT ;  Surgeon: Lola William MD;  [facility];  Service:    Mayeene Jordan W/ RETROGRADES Bilateral 7/27/2016    Procedure: Margaret Corcoran; RETROGRADE PYELOGRAM ;  Surgeon: Lola William MD;  [facility];  Service:     HERNIA REPAIR      IR AV FISTULAGRAM/GRAFTOGRAM  2/10/2021    IR NON-TUNNELED CENTRAL LINE PLACEMENT  7/17/2020    IR NON-TUNNELED CENTRAL LINE PLACEMENT  8/14/2020    LAPAROTOMY N/A 8/9/2020    Procedure: LAPAROTOMY EXPLORATORY, PARASTOMAL HERNIA REPAIR WITH MESH;  Surgeon: Tre Barker DO;  [facility];  Service: General    CA ANASTOMOSIS,AV,ANY SITE Right 1/5/2021    Procedure: Creation of right brachiobasilic fistula; Surgeon: Bryant Chairez MD;  [facility];  Service: Vascular    ID COLONOSCOPY FLX DX W/COLLJ SPEC WHEN PFRMD N/A 8/31/2016    Procedure: COLONOSCOPY;  Surgeon: Ofe Willis MD;  [facility];   Service: Gastroenterology    ID CYSTOSCOPY,INSERT URETERAL STENT Bilateral 7/27/2016    Procedure: STENT INSERTION; EXCISION OF MESH ;  Surgeon: Pamela Davenport MD;  [facility];  Service: Urology    TONSILLECTOMY      TUBAL LIGATION      WISDOM TOOTH EXTRACTION       Family History   Problem Relation Age of Onset    Cancer Mother         small cell cancer     Heart disease Father     COPD Father     Heart disease Brother     Nephrolithiasis Brother        [place], [place]  Date: 5/11/2021 Time: 10:53 AM

## 2021-05-11 NOTE — PLAN OF CARE
Problem: Potential for Falls  Goal: Patient will remain free of falls  Description: INTERVENTIONS:  - Assess patient frequently for physical needs  -  Identify cognitive and physical deficits and behaviors that affect risk of falls    -  [place] fall precautions as indicated by assessment   - Educate patient/family on patient safety including physical limitations  - Instruct patient to call for assistance with activity based on assessment  - Modify environment to reduce risk of injury  - Consider OT/PT consult to assist with strengthening/mobility  Outcome: Progressing     Problem: PAIN - ADULT  Goal: Verbalizes/displays adequate comfort level or baseline comfort level  Description: Interventions:  - Encourage patient to monitor pain and request assistance  - Assess pain using appropriate pain scale  - Administer analgesics based on type and severity of pain and evaluate response  - Implement non-pharmacological measures as appropriate and evaluate response  - Consider cultural and social influences on pain and pain management  - Notify physician/advanced practitioner if interventions unsuccessful or patient reports new pain  Outcome: Progressing     Problem: INFECTION - ADULT  Goal: Absence or prevention of progression during hospitalization  Description: INTERVENTIONS:  - Assess and monitor for signs and symptoms of infection  - Monitor lab/diagnostic results  - Monitor all insertion sites, i e  indwelling lines, tubes, and drains  - Monitor endotracheal if appropriate and nasal secretions for changes in amount and color  - [place] appropriate cooling/warming therapies per order  - Administer medications as ordered  - Instruct and encourage patient and family to use good hand hygiene technique  - Identify and instruct in appropriate isolation precautions for identified infection/condition  Outcome: Progressing  Goal: Absence of fever/infection during neutropenic period  Description: INTERVENTIONS:  - Monitor WBC    Outcome: Progressing     Problem: SAFETY ADULT  Goal: Patient will remain free of falls  Description: INTERVENTIONS:  - Assess patient frequently for physical needs  -  Identify cognitive and physical deficits and behaviors that affect risk of falls    -  [place] fall precautions as indicated by assessment   - Educate patient/family on patient safety including physical limitations  - Instruct patient to call for assistance with activity based on assessment  - Modify environment to reduce risk of injury  - Consider OT/PT consult to assist with strengthening/mobility  Outcome: Progressing  Goal: Maintain or return to baseline ADL function  Description: INTERVENTIONS:  -  Assess patient's ability to carry out ADLs; assess patient's baseline for ADL function and identify physical deficits which impact ability to perform ADLs (bathing, care of mouth/teeth, toileting, grooming, dressing, etc )  - Assess/evaluate cause of self-care deficits   - Assess range of motion  - Assess patient's mobility; develop plan if impaired  - Assess patient's need for assistive devices and provide as appropriate  - Encourage maximum independence but intervene and supervise when necessary  - Involve family in performance of ADLs  - Assess for home care needs following discharge   - Consider OT consult to assist with ADL evaluation and planning for discharge  - Provide patient education as appropriate  Outcome: Progressing  Goal: Maintain or return mobility status to optimal level  Description: INTERVENTIONS:  - Assess patient's baseline mobility status (ambulation, transfers, stairs, etc )    - Identify cognitive and physical deficits and behaviors that affect mobility  - Identify mobility aids required to assist with transfers and/or ambulation (gait belt, sit-to-stand, lift, walker, cane, etc )  - [place] fall precautions as indicated by assessment  - Record patient progress and toleration of activity level on Mobility SBAR; progress patient to next Phase/Stage  - Instruct patient to call for assistance with activity based on assessment  - Consider rehabilitation consult to assist with strengthening/weightbearing, etc   Outcome: Progressing     Problem: DISCHARGE PLANNING  Goal: Discharge to home or other facility with appropriate resources  Description: INTERVENTIONS:  - Identify barriers to discharge w/patient and caregiver  - Arrange for needed discharge resources and transportation as appropriate  - Identify discharge learning needs (meds, wound care, etc )  - Arrange for interpretive services to assist at discharge as needed  - Refer to Case Management Department for coordinating discharge planning if the patient needs post-hospital services based on physician/advanced practitioner order or complex needs related to functional status, cognitive ability, or social support system  Outcome: Progressing     Problem: Knowledge Deficit  Goal: Patient/family/caregiver demonstrates understanding of disease process, treatment plan, medications, and discharge instructions  Description: Complete learning assessment and assess knowledge base    Interventions:  - Provide teaching at level of understanding  - Provide teaching via preferred learning methods  Outcome: Progressing

## 2021-05-11 NOTE — PROGRESS NOTES
Progress Note -Interventional Radiology PA  Sylvia Wilkinson 77 y o  female MRN: [id number]  Unit/Bed#: [facility] 803-01 Encounter: [id number]    Assessment/Plan:    77year old female with renal failure prior cystectomy/conduit, left hydronephrosis  L PCN was inserted with support of anesthesia on 5/10/2021  During insertion blood was aspirated and possible duplicated collecting system was found  Urine still blood tinged this AM, will get less so as the days progress post procedure      - flush with 10 mL NSS BID  - record I/O's  - urine culture pending  - f/u with IR Q3 months for routine tube changes  - appreciate urology recommendations       Patient Active Problem List   Diagnosis    Chronic saddle pulmonary embolism (HCC)    Bladder mass    Small bowel obstruction (HCC)    Obstructive uropathy    Secondary hyperparathyroidism of renal origin ([address] )    Hypothyroidism    Hypertension    Polycythemia vera ([address] )    Subdural hematoma, acute (HCC)    Intraventricular hemorrhage (HCC)    Recurrent Clostridium difficile diarrhea    Anemia in CKD (chronic kidney disease)    Mass of urethra    Stage 5 chronic kidney disease not on chronic dialysis ([address] )    Thoracic compression fracture (HCC)    Hematuria    Abnormal finding on MRI of brain    Benign neoplasm of supratentorial region of brain ([address] )    Meningioma ([address] )    UTI (urinary tract infection)    Herpes zoster    Inverted T wave    Polycythemia    Encounter for surgical aftercare following surgery on the digestive system    History of Clostridioides difficile colitis    History of shingles    Depression    Adult BMI 39 0-39 9 kg/sq m    Chronic thrombosis of subclavian vein (HCC)    Swelling of right upper extremity    Hyperlipemia    Gross hematuria    Hydronephrosis of left kidney    Anemia          Subjective: Sylvia Wilkinson is a 77 y o  female who presented with gross hematuria  Patient does not have any pain at this time   She is tired this AM  She did have a rough night and described some shaking chills and was hot and cold  I explained this was unfortunately not unexpected given what was found after her PCN was placed with her appearance of her urine from her kidney was pus like and blood tinged and her urine culture is pending  Patient is feeling better at this time, although does not have much of an appetite  Objective:    Vitals:  /66 (BP Location: Left arm)   Pulse 86   Temp 99 8 °F (37 7 °C) (Oral)   Resp 18   Ht 5' (1 524 m)   Wt 89 5 kg (197 lb 4 8 oz)   SpO2 96%   BMI 38 53 kg/m²   Body mass index is 38 53 kg/m²  Weight (last 2 days)     None          I/Os:    Intake/Output Summary (Last 24 hours) at 5/11/2021 0947  Last data filed at 5/11/2021 0449  Gross per 24 hour   Intake 1119 17 ml   Output 1275 ml   Net -155 83 ml       Invasive Devices     Peripheral Intravenous Line            Peripheral IV 05/08/21 Left Hand 2 days          Line            Hemodialysis AV Fistula Right Upper arm -- days          Drain            Urostomy Ileal conduit RUQ 1679 days    Nephrostomy Left 1 8 Fr  less than 1 day                Physical Exam:  Physical Exam  Vitals signs and nursing note reviewed  Constitutional:       General: She is not in acute distress  Appearance: She is well-developed  HENT:      Head: Normocephalic and atraumatic  Eyes:      Conjunctiva/sclera: Conjunctivae normal    Neck:      Musculoskeletal: Neck supple  Cardiovascular:      Rate and Rhythm: Normal rate and regular rhythm  Heart sounds: No murmur  Pulmonary:      Effort: Pulmonary effort is normal  No respiratory distress  Breath sounds: Normal breath sounds  Abdominal:      Palpations: Abdomen is soft  Tenderness: There is no abdominal tenderness  Genitourinary:     Comments: L PCN present   Skin:     General: Skin is warm and dry  Capillary Refill: Capillary refill takes 2 to 3 seconds        Findings: Ecchymosis (scattered ecchymosis on arms) present  Neurological:      Mental Status: She is alert  Lab Results and Cultures:   CBC with diff:   Lab Results   Component Value Date    WBC 8 71 05/11/2021    HGB 7 5 (L) 05/11/2021    HCT 24 7 (L) 05/11/2021    MCV 97 05/11/2021     05/11/2021    MCH 29 3 05/11/2021    MCHC 30 4 (L) 05/11/2021    RDW 13 9 05/11/2021    MPV 9 7 05/11/2021    NRBC 0 05/11/2021      BMP/CMP:  Lab Results   Component Value Date     12/17/2015    K 4 7 05/11/2021    K 3 7 12/17/2015     (H) 05/11/2021     12/17/2015    CO2 19 (L) 05/11/2021    CO2 28 10/04/2016    ANIONGAP 9 12/17/2015    BUN 38 (H) 05/11/2021    BUN 8 12/17/2015    CREATININE 3 62 (H) 05/11/2021    CREATININE 0 95 12/17/2015    GLUCOSE 138 10/04/2016    GLUCOSE 96 12/17/2015    CALCIUM 8 3 05/11/2021    CALCIUM 8 9 12/17/2015    AST 16 05/08/2021     (H) 11/06/2015    ALT 12 05/08/2021    ALT 25 11/06/2015    ALKPHOS 85 05/08/2021    ALKPHOS 215 (H) 11/06/2015    PROT 6 2 (L) 11/06/2015    BILITOT 0 49 11/06/2015    EGFR 12 05/11/2021   ,     Coags:   Lab Results   Component Value Date    PTT 34 05/08/2021    PTT 50 (H) 10/31/2015    INR 1 39 (H) 05/11/2021    INR 1 42 (H) 11/03/2015   ,   Results from last 7 days   Lab Units 05/11/21  0441 05/10/21  0842 05/08/21  1219   PTT seconds  --   --  34   INR  1 39* 1 27* 1 70*        Lipid Panel:   Lab Results   Component Value Date    CHOL 175 05/20/2015     Lab Results   Component Value Date    HDL 70 (H) 04/18/2019     Lab Results   Component Value Date    HDL 70 (H) 04/18/2019     Lab Results   Component Value Date    LDLCALC 125 (H) 04/18/2019     Lab Results   Component Value Date    TRIG 110 04/18/2019       HgbA1c:   Lab Results   Component Value Date    HGBA1C 5 5 09/02/2020    HGBA1C 5 1 05/20/2015       Blood Culture:   Lab Results   Component Value Date    BLOODCX No Growth After 5 Days   05/23/2019    BLOODCX No Growth After 5 Days  05/23/2019   ,   Urinalysis:   Lab Results   Component Value Date    Claudell Manning 05/08/2021    COLORU Yellow 09/09/2015    CLARITYU Turbid 05/08/2021    CLARITYU Cloudy 09/09/2015    SPECGRAV 1 013 05/08/2021    SPECGRAV <=1 005 09/09/2015    PHUR Interference-unable to analyze (A) 05/08/2021    PHUR 6 5 07/14/2020    PHUR 6 0 09/09/2015    LEUKOCYTESUR Interference- unable to analyze (A) 05/08/2021    LEUKOCYTESUR Large (A) 09/09/2015    NITRITE Interference- unable to analyze 05/08/2021    NITRITE Positive (A) 09/09/2015    PROTEINUA 30 (1+) (A) 09/09/2015    GLUCOSEU Interference- unable to analyze 05/08/2021    GLUCOSEU Negative 09/09/2015    KETONESU Interference- unable to analyze (A) 05/08/2021    KETONESU Negative 09/09/2015    BILIRUBINUR Interference- unable to analyze (A) 05/08/2021    BILIRUBINUR Negative 09/09/2015    BLOODU Interference- unable to analyze (A) 05/08/2021    BLOODU Moderate (A) 09/09/2015   ,   Urine Culture:   Lab Results   Component Value Date    URINECX >100,000 cfu/ml  05/08/2021   ,   Wound Culure:  No results found for: WOUNDCULT    VTE Pharmacologic Prophylaxis: Sequential compression device (Venodyne)       Thank you for allowing me to participate in the care of [employer]  Please don't hesitate to call, text, email, or [employer] with any questions  This text is generated with voice recognition software  There may be translation, syntax,  or grammatical errors  If you have any questions, please contact the dictating provider

## 2021-05-11 NOTE — ASSESSMENT & PLAN NOTE
Lab Results   Component Value Date    EGFR 12 05/11/2021    EGFR 12 05/10/2021    EGFR 12 05/09/2021    CREATININE 3 62 (H) 05/11/2021    CREATININE 3 52 (H) 05/10/2021    CREATININE 3 57 (H) 05/09/2021   Estimated Creatinine Clearance: 13 6 mL/min (A) (by C-G formula based on SCr of 3 62 mg/dL (H))    Cr near baseline 3 4-3 5, follows with Dr Sary Jimenez  · No significant improvement s/p nephrostomy tube  · Renal input appreciated   · Monitor BMP

## 2021-05-11 NOTE — PLAN OF CARE
Problem: Potential for Falls  Goal: Patient will remain free of falls  Description: INTERVENTIONS:  - Assess patient frequently for physical needs  -  Identify cognitive and physical deficits and behaviors that affect risk of falls    -  [place] fall precautions as indicated by assessment   - Educate patient/family on patient safety including physical limitations  - Instruct patient to call for assistance with activity based on assessment  - Modify environment to reduce risk of injury  - Consider OT/PT consult to assist with strengthening/mobility  Outcome: Progressing     Problem: PAIN - ADULT  Goal: Verbalizes/displays adequate comfort level or baseline comfort level  Description: Interventions:  - Encourage patient to monitor pain and request assistance  - Assess pain using appropriate pain scale  - Administer analgesics based on type and severity of pain and evaluate response  - Implement non-pharmacological measures as appropriate and evaluate response  - Consider cultural and social influences on pain and pain management  - Notify physician/advanced practitioner if interventions unsuccessful or patient reports new pain  Outcome: Progressing     Problem: INFECTION - ADULT  Goal: Absence or prevention of progression during hospitalization  Description: INTERVENTIONS:  - Assess and monitor for signs and symptoms of infection  - Monitor lab/diagnostic results  - Monitor all insertion sites, i e  indwelling lines, tubes, and drains  - Monitor endotracheal if appropriate and nasal secretions for changes in amount and color  - [place] appropriate cooling/warming therapies per order  - Administer medications as ordered  - Instruct and encourage patient and family to use good hand hygiene technique  - Identify and instruct in appropriate isolation precautions for identified infection/condition  Outcome: Progressing  Goal: Absence of fever/infection during neutropenic period  Description: INTERVENTIONS:  - Monitor WBC    Outcome: Progressing     Problem: SAFETY ADULT  Goal: Patient will remain free of falls  Description: INTERVENTIONS:  - Assess patient frequently for physical needs  -  Identify cognitive and physical deficits and behaviors that affect risk of falls    -  [place] fall precautions as indicated by assessment   - Educate patient/family on patient safety including physical limitations  - Instruct patient to call for assistance with activity based on assessment  - Modify environment to reduce risk of injury  - Consider OT/PT consult to assist with strengthening/mobility  Outcome: Progressing  Goal: Maintain or return to baseline ADL function  Description: INTERVENTIONS:  -  Assess patient's ability to carry out ADLs; assess patient's baseline for ADL function and identify physical deficits which impact ability to perform ADLs (bathing, care of mouth/teeth, toileting, grooming, dressing, etc )  - Assess/evaluate cause of self-care deficits   - Assess range of motion  - Assess patient's mobility; develop plan if impaired  - Assess patient's need for assistive devices and provide as appropriate  - Encourage maximum independence but intervene and supervise when necessary  - Involve family in performance of ADLs  - Assess for home care needs following discharge   - Consider OT consult to assist with ADL evaluation and planning for discharge  - Provide patient education as appropriate  Outcome: Progressing  Goal: Maintain or return mobility status to optimal level  Description: INTERVENTIONS:  - Assess patient's baseline mobility status (ambulation, transfers, stairs, etc )    - Identify cognitive and physical deficits and behaviors that affect mobility  - Identify mobility aids required to assist with transfers and/or ambulation (gait belt, sit-to-stand, lift, walker, cane, etc )  - [place] fall precautions as indicated by assessment  - Record patient progress and toleration of activity level on Mobility SBAR; progress patient to next Phase/Stage  - Instruct patient to call for assistance with activity based on assessment  - Consider rehabilitation consult to assist with strengthening/weightbearing, etc   Outcome: Progressing     Problem: DISCHARGE PLANNING  Goal: Discharge to home or other facility with appropriate resources  Description: INTERVENTIONS:  - Identify barriers to discharge w/patient and caregiver  - Arrange for needed discharge resources and transportation as appropriate  - Identify discharge learning needs (meds, wound care, etc )  - Arrange for interpretive services to assist at discharge as needed  - Refer to Case Management Department for coordinating discharge planning if the patient needs post-hospital services based on physician/advanced practitioner order or complex needs related to functional status, cognitive ability, or social support system  Outcome: Progressing     Problem: Knowledge Deficit  Goal: Patient/family/caregiver demonstrates understanding of disease process, treatment plan, medications, and discharge instructions  Description: Complete learning assessment and assess knowledge base    Interventions:  - Provide teaching at level of understanding  - Provide teaching via preferred learning methods  Outcome: Progressing

## 2021-05-11 NOTE — PROGRESS NOTES
[address] [facility]  Progress Note - Sheral Pump [birth year], 77 y o  female MRN: [id number]  Unit/Bed#: [facility] 803-01 Encounter: [id number]  Primary Care Provider: Vielka Mathews MD   Date and time admitted to hospital: 5/8/2021 11:18 AM    * Gross hematuria  Assessment & Plan  Patient presented to ED for gross hematuria  · PMH of Stage 5 kidney CKD, obstructive uropathy, s/p cystectomy with ilial conduit  · CT abdomin pelvis: New left hydronephrosis with perinephric stranding   · Suggests the possibility of more acute obstruction and/or pyelonephritis  · Urology consulted and appreciated input  · IR following, s/p L nephrostomy tube placement on 5/11 with Dr Duncan Mcghee (IR)  · Monitor H/H, transfuse PRN  · Urine cx pre-operatively with mixed contaminants x3  · During IR procedure was noted to have purulent urine, started on abx   · Holding eliquis and asa per urology/IR    Hydronephrosis of left kidney  Assessment & Plan  IR and urology consulted  · S/P Perc neph tube on 5/11 with IR   · See above    Chronic saddle pulmonary embolism ([address] )  Assessment & Plan  Many years ago per patient  · Eliquis held due to hematuria and need for nephrostomy tube placement  · Will resume eliquis when cleared by IR/Urology     Anemia  Assessment & Plan  Hgb 7 5; likely 2/2 hematuria coupled with anemia of CKD at baseline  · Trend CBC  · Transfuse PRN    Hyperlipemia  Assessment & Plan  · Continue statin    Stage 5 chronic kidney disease not on chronic dialysis [facility])  Assessment & Plan  Lab Results   Component Value Date    EGFR 12 05/11/2021    EGFR 12 05/10/2021    EGFR 12 05/09/2021    CREATININE 3 62 (H) 05/11/2021    CREATININE 3 52 (H) 05/10/2021    CREATININE 3 57 (H) 05/09/2021   Estimated Creatinine Clearance: 13 6 mL/min (A) (by C-G formula based on SCr of 3 62 mg/dL (H))    Cr near baseline 3 4-3 5, follows with Dr Lorene Haines  · No significant improvement s/p nephrostomy tube  · Renal input appreciated   · Monitor BMP     Polycythemia vera ([facility] [address] )  Assessment & Plan  Patient follows up with outpatient Hematology Oncology, currently on Jakafi 10 mg Oral Daily  · Brought medication in from home   · Monitor labs     Obstructive uropathy  Assessment & Plan  S/p ileostomy for nonfunctioning bladder and chronic hydro  · Urology following         VTE Pharmacologic Prophylaxis: VTE Score: 6 Moderate Risk (Score 3-4) - Pharmacological DVT Prophylaxis Contraindicated  Sequential Compression Devices Ordered  Patient Centered Rounds: I performed bedside rounds with nursing staff today  Discussions with Specialists or Other Care Team Provider: CM, IR, urology, renal     Education and Discussions with Family / Patient: Updated  (son) via phone  Time Spent for Care: 30 minutes  More than 50% of total time spent on counseling and coordination of care as described above  Current Length of Stay: 3 day(s)  Current Patient Status: Inpatient   Certification Statement: The patient will continue to require additional inpatient hospital stay due to IV abx, monitoring of renal function, tube function   Discharge Plan: Anticipate discharge in 48 hrs to home with services vs rehab ? Code Status: Level 1 - Full Code    Subjective:   Doing okay today  No complaints other than fatigue  No BM in few days  No SOB/CP  Has not been ambulating much  Occasional twinge of pain noted in L flank area  Objective:     Vitals:   Temp (24hrs), Av 5 °F (36 9 °C), Min:97 3 °F (36 3 °C), Max:99 8 °F (37 7 °C)    Temp:  [97 3 °F (36 3 °C)-99 8 °F (37 7 °C)] 99 8 °F (37 7 °C)  HR:  [72-90] 86  Resp:  [14-18] 18  BP: (116-149)/(64-86) 122/66  SpO2:  [91 %-100 %] 96 %  Body mass index is 38 53 kg/m²  Input and Output Summary (last 24 hours):      Intake/Output Summary (Last 24 hours) at 2021 1030  Last data filed at 2021 0449  Gross per 24 hour   Intake 1119 17 ml   Output 1275 ml   Net -155 83 ml Physical Exam:   Physical Exam  Vitals signs and nursing note reviewed  Constitutional:       Appearance: She is obese  Cardiovascular:      Rate and Rhythm: Normal rate and regular rhythm  Pulmonary:      Effort: No respiratory distress  Abdominal:      General: There is no distension  Tenderness: There is no abdominal tenderness  Musculoskeletal:      Right lower leg: Edema (trace) present  Left lower leg: Left lower leg edema: trace  Skin:     Coloration: Skin is pale  Comments: Bruising noted to LUE    Neurological:      Mental Status: She is oriented to person, place, and time  Psychiatric:      Comments: Flat           Additional Data:     Labs:  Results from last 7 days   Lab Units 05/11/21  0441   WBC Thousand/uL 8 71   HEMOGLOBIN g/dL 7 5*   HEMATOCRIT % 24 7*   PLATELETS Thousands/uL 353   NEUTROS PCT % 86*   LYMPHS PCT % 6*   MONOS PCT % 6   EOS PCT % 0     Results from last 7 days   Lab Units 05/11/21 0441 05/08/21  1318   SODIUM mmol/L 139   < >  --    POTASSIUM mmol/L 4 7   < >  --    CHLORIDE mmol/L 112*   < >  --    CO2 mmol/L 19*   < >  --    BUN mg/dL 38*   < >  --    CREATININE mg/dL 3 62*   < >  --    ANION GAP mmol/L 8   < >  --    CALCIUM mg/dL 8 3   < >  --    ALBUMIN g/dL  --   --  2 9*   TOTAL BILIRUBIN mg/dL  --   --  0 56   ALK PHOS U/L  --   --  85   ALT U/L  --   --  12   AST U/L  --   --  16   GLUCOSE RANDOM mg/dL 100   < >  --     < > = values in this interval not displayed  Results from last 7 days   Lab Units 05/11/21 0441   INR  1 39*                   Lines/Drains:  Invasive Devices     Peripheral Intravenous Line            Peripheral IV 05/08/21 Left Hand 2 days          Line            Hemodialysis AV Fistula Right Upper arm -- days          Drain            Urostomy Ileal conduit RUQ 1679 days    Nephrostomy Left 1 8 Fr  less than 1 day                      Imaging: No pertinent imaging reviewed      Recent Cultures (last 7 days): Results from last 7 days   Lab Units 05/10/21  1542 05/08/21  1338   GRAM STAIN RESULT  1+ Gram positive cocci in pairs*  1+ Gram negative rods*  No polys seen*  --    URINE CULTURE   --  >100,000 cfu/ml        Last 24 Hours Medication List:   Current Facility-Administered Medications   Medication Dose Route Frequency Provider Last Rate    acetaminophen  650 mg Oral Q6H PRN Romana Cure, DO      atorvastatin  40 mg Oral Daily With [facility], DO      bisacodyl  10 mg Rectal Daily PRN Edward Fowler PA-C      calcitriol  0 25 mcg Oral Every Other Day Romana Cure, DO      cefTRIAXone  1,000 mg Intravenous Q24H Edward Fowler PA-C 1,000 mg (05/11/21 1029)    cholecalciferol  1,000 Units Oral Daily Romana Cure, DO      citalopram  20 mg Oral Daily Romana Cure, DO      docusate sodium  100 mg Oral BID Edward Fwoler PA-C      fentaNYL  25 mcg Intravenous Q5 Min PRN Jenise Chavis, MUKESH      levofloxacin  500 mg Intravenous Once Kalen Cotton MD      levothyroxine  75 mcg Oral Early Morning Romana Cure, DO      melatonin  3 mg Oral HS Romana Cure, DO      metoprolol tartrate  25 mg Oral BID Romana Cure, DO      ondansetron  4 mg Intravenous Once PRN Jenise Chavis CRNA      oxyCODONE  2 5 mg Oral Q4H PRN Roberta Abad PA-C      polyethylene glycol  17 g Oral Daily Beverley Murcia PA-C      ruxolitinib  10 mg Oral Daily Shannon Bo MD      saccharomyces boulardii  250 mg Oral Daily Romana Cure, DO          Today, Patient Was Seen By: Edward Fowler PA-C    **Please Note: This note may have been constructed using a voice recognition system  **

## 2021-05-11 NOTE — CONSULTS
Consultation - Nephrology   Jai Alcantar 77 y o  female MRN: [id number]  Unit/Bed#: [facility] 803-01 Encounter: [id number]    ASSESSMENT/PLAN:   1  CKD V: baseline creatinine mid threes and follows with Dr Basil Woodall  Creatinine currently stable around 3 5-3 6 which seems to be in her baseline  · Etiology:  Obstructive uropathy with functional solitary right kidney and frequent episodes of acute kidney injury  · UA:  Innumerable rbc's, wbc's and bacteria  · Access:  Status post AV fistula 01/05/2021  · Has vascular follow up 5/17  Per last IR note may need superficialization   2  History nonfunctional bladder also bilateral hydronephrosis status post cystectomy with ileal conduit:  Admitted with worsening left hydronephrosis and status post left PCN  3  Gross hematuria:  UA showed mixed contaminant but during IR procedure she was noted to have purulent urine and placed on ceftriaxone  4  Hypertension:  Blood pressure acceptable  Continue metoprolol 25 mg b i d  With hold parameters  5  Anemia of chronic disease:  Not on JENELLE with history of PE  Transfuse prn   6  Polycythemia vera with history of PE: On Eliquis  7  Mineral bone disease of CKD:  On calcitriol 0 25 mcg 3 times a week  8  Metabolic acidosis: start sodium bicarb 650mg bid     HISTORY OF PRESENT ILLNESS:  Requesting Physician: Elio Brooke MD  Reason for Consult:  CKD stage 5    Jai Alcantar is a 77y o  year old female who was admitted to  [facility] with hematuria  She has a history of cystectomy with ileal conduit and chronic hydronephrosis and also has advanced CKD stage 5 and follows in our office with Dr Alex Cardona  She recently developed gross hematuria and came to the ER on 05/08  CT revealed worsening left hydronephrosis and perinephric stranding indicating more acute obstruction and/or pyelonephritis  She is being treated with ceftriaxone and on 5/10 she had a left PCN placed    Today Nephrology was consulted for assistance with her CKD  Patient complains of feeling very tired but otherwise is okay  She has no recent chest pain, shortness of breath, nausea, vomiting or diarrhea  She is making urine        PAST MEDICAL HISTORY:  Past Medical History:   Diagnosis Date    Anxiety     Bowel obstruction ([facility] [address] )     Chronic kidney disease (CKD), stage IV (severe) (HCC)     stage IV    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    Circulation problem     Compression fracture of cervical spine (HCC)     Hernia of abdominal cavity     History of kidney problems     Hydronephrosis     Hypertension     IBS (irritable bowel syndrome)     Incontinence     Lung mass     Improving on PET/CT 1/2016    Polycythemia vera ([facility] [address] )     Pulmonary embolism ([facility] [address] ) 2014    Shingles     Urinary tract infection        PAST SURGICAL HISTORY:  Past Surgical History:   Procedure Laterality Date    ABDOMINAL ADHESION SURGERY N/A 8/9/2020    Procedure: LYSIS ADHESIONS;  Surgeon: Jes High DO;  [facility];  Service: General    BLADDER SUSPENSION      BOTOX INJECTION N/A 7/27/2016    Procedure: BOTOX INJECTION ;  Surgeon: Rahul Cullen MD;  [facility];  Service:     CHOLECYSTECTOMY N/A     COLONOSCOPY      CYSTECTOMY, RADICAL WITH ILEOCONDUIT N/A 10/4/2016    Procedure: [address] [place] [address] ;  Surgeon: Rahul Cullen MD;  [facility];  Service:    Toña Coaster W/ RETROGRADES Bilateral 7/27/2016    Procedure: CYSTOSCOPY; RETROGRADE PYELOGRAM ;  Surgeon: Rahul Cullen MD;  [facility];  Service:     HERNIA REPAIR      IR AV FISTULAGRAM/GRAFTOGRAM  2/10/2021    IR NON-TUNNELED CENTRAL LINE PLACEMENT  7/17/2020    IR NON-TUNNELED CENTRAL LINE PLACEMENT  8/14/2020    LAPAROTOMY N/A 8/9/2020    Procedure: LAPAROTOMY EXPLORATORY, PARASTOMAL HERNIA REPAIR WITH MESH;  Surgeon: Jes High DO;  [facility];  Service: General    IA ANASTOMOSIS,AV,ANY SITE Right 1/5/2021    Procedure: Creation of right brachiobasilic fistula; Surgeon: Patel Chairez MD;  [facility];  Service: Vascular    AK COLONOSCOPY FLX DX W/COLLJ SPEC WHEN PFRMD N/A 8/31/2016    Procedure: COLONOSCOPY;  Surgeon: Jordi Driscoll MD;  Location: [facility]; Service: Gastroenterology    AK CYSTOSCOPY,INSERT URETERAL STENT Bilateral 7/27/2016    Procedure: STENT INSERTION; EXCISION OF MESH ;  Surgeon: Pilar Valencia MD;  [facility];  Service: Urology    TONSILLECTOMY      TUBAL LIGATION      WISDOM TOOTH EXTRACTION         ALLERGIES:  Allergies   Allergen Reactions    Chlorhexidine Rash     petichi like rash when using chlorhexidine swabs prior to IV          SOCIAL HISTORY:  Social History     Substance and Sexual Activity   Alcohol Use Not Currently    Frequency: Never    Binge frequency: Never    Comment: n/s     Social History     Substance and Sexual Activity   Drug Use Not Currently    Comment: n/a     Social History     Tobacco Use   Smoking Status Never Smoker   Smokeless Tobacco Never Used   Tobacco Comment    n/a       FAMILY HISTORY:  Family History   Problem Relation Age of Onset    Cancer Mother         small cell cancer     Heart disease Father     COPD Father     Heart disease Brother     Nephrolithiasis Brother        MEDICATIONS:  Scheduled Meds:  Current Facility-Administered Medications   Medication Dose Route Frequency Provider Last Rate    acetaminophen  650 mg Oral Q6H PRN Darnell Dorsey, DO      atorvastatin  40 mg Oral Daily With Royal [facility], DO      bisacodyl  10 mg Rectal Daily PRN Lizzie Luke PA-C      calcitriol  0 25 mcg Oral Every Other Day Darnell Dorsey, DO      cefTRIAXone  1,000 mg Intravenous Q24H Beverley Murcia PA-C 1,000 mg (05/11/21 1029)    cholecalciferol  1,000 Units Oral Daily Darnell Dorsey, DO      citalopram  20 mg Oral Daily Darnell Dorsey, DO      docusate sodium  100 mg Oral BID Lizzie Luke PA-C  fentaNYL  25 mcg Intravenous Q5 Min PRN Giancarlo Ruiz, CRNA      levofloxacin  500 mg Intravenous Once Andi Moritz, MD      levothyroxine  75 mcg Oral Early Morning Elston Sever, DO      melatonin  3 mg Oral HS Elston Sever, DO      metoprolol tartrate  25 mg Oral BID Elston Sever, DO      ondansetron  4 mg Intravenous Once PRN Giancarlo Ruiz, CRNA      oxyCODONE  2 5 mg Oral Q4H PRN Roberta Abad PA-C      polyethylene glycol  17 g Oral Daily Aric Mercado PA-C      ruxolitinib  10 mg Oral Daily Niseren Rao MD      saccharomyces boulardii  250 mg Oral Daily Elston Sever, DO         PRN Meds:   acetaminophen    bisacodyl    fentaNYL    ondansetron    oxyCODONE    REVIEW OF SYSTEMS:  A complete review of systems was done  Pertinent positives and negatives noted in the HPI but otherwise the review of systems is negative      PHYSICAL EXAM:  Current Weight: Weight - Scale: 89 5 kg (197 lb 4 8 oz)  First Weight: Weight - Scale: 89 5 kg (197 lb 4 8 oz)  Vitals:    05/11/21 0737   BP: 122/66   Pulse: 86   Resp: 18   Temp: 99 8 °F (37 7 °C)   SpO2: 96%       Intake/Output Summary (Last 24 hours) at 5/11/2021 1206  Last data filed at 5/11/2021 0449  Gross per 24 hour   Intake 1119 17 ml   Output 1275 ml   Net -155 83 ml     General:  No acute distress  Skin:  No rash, ecchymosis L knee/leg   Eyes:  Sclerae anicteric  ENT:  Moist mucous membranes  Neck:  Trachea midline with no JVD  Chest:  Clear to auscultation bilaterally  CVS:  Regular rate and rhythm  Abdomen:  Soft, nontender, nondistended  : +camp, L PCN   Extremities:  No edema  Neuro:  Awake and alert  Psych:  Appropriate affect      Lab Results:   Results from last 7 days   Lab Units 05/11/21  0441 05/10/21  0511 05/09/21  0528 05/08/21  1831 05/08/21  1219   WBC Thousand/uL 8 71 5 17 4 70  --  5 35   HEMOGLOBIN g/dL 7 5* 8 0* 8 0* 7 9* 8 7*   HEMATOCRIT % 24 7* 26 1* 25 8* 25 7* 27 8*   PLATELETS Thousands/uL 353 347 344  --  395*   SODIUM mmol/L 139 139 137  --  137   POTASSIUM mmol/L 4 7 4 5 4 4  --  5 0   CHLORIDE mmol/L 112* 112* 110*  --  109*   CO2 mmol/L 19* 20* 21  --  19*   BUN mg/dL 38* 44* 43*  --  42*   CREATININE mg/dL 3 62* 3 52* 3 57*  --  3 68*   CALCIUM mg/dL 8 3 8 2* 8 3  --  8 9   MAGNESIUM mg/dL  --   --  2 0  --   --    PHOSPHORUS mg/dL  --   --  3 4  --   --        Radiology Results:   XR knee 4+ views left injury   Final Result by Reese Acosta MD (05/09 9363)      No acute osseous abnormality  Degenerative changes as described  Workstation performed: [id number]         CT abdomen pelvis wo contrast   Final Result by Jose Zaldivar MD (05/08 1242)      New left hydronephrosis with perinephric stranding suggests the possibility of more acute obstruction and/or pyelonephritis  The degree of dilatation on the left is more similar to a study from August 2020  There is also dilated ureter extending to    surgical sutures in the region of the ileal conduit suggesting possible stricture  No obvious stone is seen distally  Urologic evaluation is advised  Nonobstructing stones are seen in the right kidney  Diffuse dilatation of large and small bowel suggest ileus, more likely than obstruction, although the degree of small bowel dilatation is somewhat improved  Follow-up is suggested  Cyst is again noted in the right lower quadrant measuring up to 8 cm in size  This was noted as far back as 2015 although has slightly enlarged  Nonemergent focused ultrasound may be useful  The study was marked in [facility] for immediate notification              Workstation performed: [id number]         IR nephrostomy tube placement    (Results Pending)   IR nephrostomy tube check/change/reposition/reinsertion/upsize    (Results Pending)

## 2021-05-12 PROBLEM — K59.00 CONSTIPATION: Status: ACTIVE | Noted: 2021-05-12

## 2021-05-12 LAB
ABO GROUP BLD: NORMAL
ACANTHOCYTES BLD QL SMEAR: PRESENT
ANION GAP SERPL CALCULATED.3IONS-SCNC: 7 MMOL/L (ref 4–13)
ANISOCYTOSIS BLD QL SMEAR: PRESENT
BACTERIA UR CULT: ABNORMAL
BASOPHILS # BLD MANUAL: 0 THOUSAND/UL (ref 0–0.1)
BASOPHILS NFR MAR MANUAL: 0 % (ref 0–1)
BLD GP AB SCN SERPL QL: NEGATIVE
BUN SERPL-MCNC: 44 MG/DL (ref 5–25)
CALCIUM SERPL-MCNC: 8.6 MG/DL (ref 8.3–10.1)
CHLORIDE SERPL-SCNC: 109 MMOL/L (ref 100–108)
CO2 SERPL-SCNC: 20 MMOL/L (ref 21–32)
CREAT SERPL-MCNC: 3.66 MG/DL (ref 0.6–1.3)
EOSINOPHIL # BLD MANUAL: 0.06 THOUSAND/UL (ref 0–0.4)
EOSINOPHIL NFR BLD MANUAL: 1 % (ref 0–6)
ERYTHROCYTE [DISTWIDTH] IN BLOOD BY AUTOMATED COUNT: 13.8 % (ref 11.6–15.1)
FERRITIN SERPL-MCNC: 237 NG/ML (ref 8–388)
GFR SERPL CREATININE-BSD FRML MDRD: 12 ML/MIN/1.73SQ M
GLUCOSE SERPL-MCNC: 95 MG/DL (ref 65–140)
HCT VFR BLD AUTO: 23.4 % (ref 34.8–46.1)
HGB BLD-MCNC: 7.3 G/DL (ref 11.5–15.4)
IRON SATN MFR SERPL: 7 %
IRON SERPL-MCNC: 19 UG/DL (ref 50–170)
LYMPHOCYTES # BLD AUTO: 0.42 THOUSAND/UL (ref 0.6–4.47)
LYMPHOCYTES # BLD AUTO: 7 % (ref 14–44)
MAGNESIUM SERPL-MCNC: 1.9 MG/DL (ref 1.6–2.6)
MCH RBC QN AUTO: 29.7 PG (ref 26.8–34.3)
MCHC RBC AUTO-ENTMCNC: 31.2 G/DL (ref 31.4–37.4)
MCV RBC AUTO: 95 FL (ref 82–98)
MICROCYTES BLD QL AUTO: PRESENT
MONOCYTES # BLD AUTO: 0.24 THOUSAND/UL (ref 0–1.22)
MONOCYTES NFR BLD: 4 % (ref 4–12)
MYELOCYTES NFR BLD MANUAL: 1 % (ref 0–1)
NEUTROPHILS # BLD MANUAL: 5.27 THOUSAND/UL (ref 1.85–7.62)
NEUTS BAND NFR BLD MANUAL: 4 % (ref 0–8)
NEUTS SEG NFR BLD AUTO: 83 % (ref 43–75)
NRBC BLD AUTO-RTO: 0 /100 WBCS
PHOSPHATE SERPL-MCNC: 3.5 MG/DL (ref 2.3–4.1)
PLATELET # BLD AUTO: 331 THOUSANDS/UL (ref 149–390)
PLATELET BLD QL SMEAR: ADEQUATE
PMV BLD AUTO: 9.9 FL (ref 8.9–12.7)
POIKILOCYTOSIS BLD QL SMEAR: PRESENT
POLYCHROMASIA BLD QL SMEAR: PRESENT
POTASSIUM SERPL-SCNC: 4.5 MMOL/L (ref 3.5–5.3)
RBC # BLD AUTO: 2.46 MILLION/UL (ref 3.81–5.12)
RBC MORPH BLD: PRESENT
RH BLD: POSITIVE
SODIUM SERPL-SCNC: 136 MMOL/L (ref 136–145)
SPECIMEN EXPIRATION DATE: NORMAL
TIBC SERPL-MCNC: 265 UG/DL (ref 250–450)
WBC # BLD AUTO: 6.06 THOUSAND/UL (ref 4.31–10.16)

## 2021-05-12 PROCEDURE — 86850 RBC ANTIBODY SCREEN: CPT | Performed by: INTERNAL MEDICINE

## 2021-05-12 PROCEDURE — 86900 BLOOD TYPING SEROLOGIC ABO: CPT | Performed by: INTERNAL MEDICINE

## 2021-05-12 PROCEDURE — 85007 BL SMEAR W/DIFF WBC COUNT: CPT | Performed by: INTERNAL MEDICINE

## 2021-05-12 PROCEDURE — 30233N1 TRANSFUSION OF NONAUTOLOGOUS RED BLOOD CELLS INTO PERIPHERAL VEIN, PERCUTANEOUS APPROACH: ICD-10-PCS | Performed by: INTERNAL MEDICINE

## 2021-05-12 PROCEDURE — 83550 IRON BINDING TEST: CPT | Performed by: INTERNAL MEDICINE

## 2021-05-12 PROCEDURE — 84100 ASSAY OF PHOSPHORUS: CPT | Performed by: INTERNAL MEDICINE

## 2021-05-12 PROCEDURE — 99232 SBSQ HOSP IP/OBS MODERATE 35: CPT | Performed by: UROLOGY

## 2021-05-12 PROCEDURE — 97163 PT EVAL HIGH COMPLEX 45 MIN: CPT

## 2021-05-12 PROCEDURE — 83735 ASSAY OF MAGNESIUM: CPT | Performed by: INTERNAL MEDICINE

## 2021-05-12 PROCEDURE — 80048 BASIC METABOLIC PNL TOTAL CA: CPT | Performed by: INTERNAL MEDICINE

## 2021-05-12 PROCEDURE — 85027 COMPLETE CBC AUTOMATED: CPT | Performed by: INTERNAL MEDICINE

## 2021-05-12 PROCEDURE — P9016 RBC LEUKOCYTES REDUCED: HCPCS

## 2021-05-12 PROCEDURE — 97535 SELF CARE MNGMENT TRAINING: CPT

## 2021-05-12 PROCEDURE — 99232 SBSQ HOSP IP/OBS MODERATE 35: CPT | Performed by: INTERNAL MEDICINE

## 2021-05-12 PROCEDURE — 82728 ASSAY OF FERRITIN: CPT | Performed by: INTERNAL MEDICINE

## 2021-05-12 PROCEDURE — 83540 ASSAY OF IRON: CPT | Performed by: INTERNAL MEDICINE

## 2021-05-12 PROCEDURE — 86923 COMPATIBILITY TEST ELECTRIC: CPT

## 2021-05-12 PROCEDURE — 86901 BLOOD TYPING SEROLOGIC RH(D): CPT | Performed by: INTERNAL MEDICINE

## 2021-05-12 PROCEDURE — 97167 OT EVAL HIGH COMPLEX 60 MIN: CPT

## 2021-05-12 RX ORDER — IRON POLYSACCHARIDE COMPLEX 150 MG
150 CAPSULE ORAL DAILY
Status: DISCONTINUED | OUTPATIENT
Start: 2021-05-12 | End: 2021-05-15

## 2021-05-12 RX ORDER — POLYETHYLENE GLYCOL 3350 17 G/17G
17 POWDER, FOR SOLUTION ORAL 2 TIMES DAILY
Status: DISCONTINUED | OUTPATIENT
Start: 2021-05-12 | End: 2021-05-16 | Stop reason: HOSPADM

## 2021-05-12 RX ORDER — BISACODYL 10 MG
10 SUPPOSITORY, RECTAL RECTAL DAILY
Status: DISCONTINUED | OUTPATIENT
Start: 2021-05-12 | End: 2021-05-13

## 2021-05-12 RX ADMIN — BISACODYL 10 MG: 10 SUPPOSITORY RECTAL at 10:45

## 2021-05-12 RX ADMIN — SODIUM BICARBONATE 650 MG TABLET 650 MG: at 17:14

## 2021-05-12 RX ADMIN — Medication 250 MG: at 08:18

## 2021-05-12 RX ADMIN — Medication 150 MG: at 13:37

## 2021-05-12 RX ADMIN — METOPROLOL TARTRATE 25 MG: 25 TABLET, FILM COATED ORAL at 17:14

## 2021-05-12 RX ADMIN — CITALOPRAM HYDROBROMIDE 20 MG: 20 TABLET ORAL at 08:18

## 2021-05-12 RX ADMIN — MELATONIN TAB 3 MG 3 MG: 3 TAB at 22:00

## 2021-05-12 RX ADMIN — CALCITRIOL 0.25 MCG: 0.25 CAPSULE, LIQUID FILLED ORAL at 08:18

## 2021-05-12 RX ADMIN — SODIUM BICARBONATE 650 MG TABLET 650 MG: at 08:19

## 2021-05-12 RX ADMIN — ATORVASTATIN CALCIUM 40 MG: 40 TABLET, FILM COATED ORAL at 17:14

## 2021-05-12 RX ADMIN — DOCUSATE SODIUM 100 MG: 100 CAPSULE ORAL at 08:18

## 2021-05-12 RX ADMIN — CEFTRIAXONE 1000 MG: 2 INJECTION, POWDER, FOR SOLUTION INTRAMUSCULAR; INTRAVENOUS at 09:19

## 2021-05-12 RX ADMIN — METOPROLOL TARTRATE 25 MG: 25 TABLET, FILM COATED ORAL at 08:18

## 2021-05-12 RX ADMIN — POLYETHYLENE GLYCOL 3350 17 G: 17 POWDER, FOR SOLUTION ORAL at 08:19

## 2021-05-12 RX ADMIN — Medication 1000 UNITS: at 08:18

## 2021-05-12 RX ADMIN — RUXOLITINIB 10 MG: 10 TABLET ORAL at 08:19

## 2021-05-12 RX ADMIN — LEVOTHYROXINE SODIUM 75 MCG: 75 TABLET ORAL at 05:03

## 2021-05-12 NOTE — ASSESSMENT & PLAN NOTE
IR and urology consulted  · S/P Perc neph tube on 5/11 with IR   · Flush with 10 mL NSS BID  · F/u IR q3 months for routine tube exchanges  · Will need ongoing nephrostomy tube drainage for at least several weeks and then re-eval with antegrade study

## 2021-05-12 NOTE — ASSESSMENT & PLAN NOTE
Lab Results   Component Value Date    EGFR 12 05/12/2021    EGFR 12 05/11/2021    EGFR 12 05/10/2021    CREATININE 3 66 (H) 05/12/2021    CREATININE 3 62 (H) 05/11/2021    CREATININE 3 52 (H) 05/10/2021   Estimated Creatinine Clearance: 13 4 mL/min (A) (by C-G formula based on SCr of 3 66 mg/dL (H))    Cr near baseline 3 4-3 5, follows with Dr Samantha Ortiz  · No significant improvement s/p nephrostomy tube  · Renal input appreciated   · Monitor BMP

## 2021-05-12 NOTE — PLAN OF CARE
Problem: PHYSICAL THERAPY ADULT  Goal: Performs mobility at highest level of function for planned discharge setting  See evaluation for individualized goals  Description: Treatment/Interventions: Functional transfer training, LE strengthening/ROM, Therapeutic exercise, Endurance training, Elevations, Patient/family training, Bed mobility, Equipment eval/education, Gait training  Equipment Recommended: Carlito Solis       See flowsheet documentation for full assessment, interventions and recommendations  Note: Prognosis: Good  Problem List: Decreased strength, Decreased endurance, Impaired balance, Decreased mobility, Decreased safety awareness  Assessment: Pt is a 77 y o  female seen for PT evaluation s/p admit to [facility] on 5/8/2021  Pt was admitted with a primary dx of: gross hematuria s/p L nephrostomy tube placement on 5/11  PT now consulted for assessment of mobility and d/c needs  Pt with Up and OOB as tolerated  orders  Pts current comorbidities effecting treatment include: CKD, Saddle PE, Polycythemia vera, obstructive uropathy  Pts current clinical presentation is Unstable/ Unpredictable (high complexity) due to Ongoing medical management for primary dx, Increased reliance on more restrictive AD compared to baseline, Decreased activity tolerance compared to baseline, Fall risk, Trending lab values  Prior to admission, pt was independent with use of straight cane for community ambulation, pt resides with autistic son  Upon evaluation, pt currently is requiring Nathan for transfers and supervision for ambulation 100 ft w/ RW  Pt presents at PT eval functioning below baseline and currently w/ overall mobility deficits 2* to: BLE weakness, impaired balance, decreased endurance, gait deviations, decreased activity tolerance compared to baseline, decreased functional mobility tolerance compared to baseline, decreased safety awareness, fall risk   Pt currently at a fall risk 2* to impairments listed above   Pt will continue to benefit from skilled acute PT interventions to address stated impairments; to maximize functional mobility; for ongoing pt/ family training; and DME needs  At conclusion of PT session pt returned back in chair with phone and call bell within reach  Pt denies any further questions at this time  Recommend home with increased family support (states she has another son locally) and HHPT upon hospital D/C  PT Discharge Recommendation: Home with home health rehabilitation     PT - OK to Discharge: No(pending stair trial)    See flowsheet documentation for full assessment

## 2021-05-12 NOTE — PLAN OF CARE
Problem: OCCUPATIONAL THERAPY ADULT  Goal: Performs self-care activities at highest level of function for planned discharge setting  See evaluation for individualized goals  Description: Treatment Interventions: ADL retraining, Functional transfer training, Endurance training, Patient/family training, Equipment evaluation/education, Compensatory technique education, Energy conservation, Activityengagement  Equipment Recommended: (continue to assess)       See flowsheet documentation for full assessment, interventions and recommendations  Note: Limitation: Decreased endurance, Decreased high-level ADLs, Decreased ADL status  Prognosis: Fair  Assessment: Pt is a 77 y o  female who was admitted to [facility] on 5/8/2021 with Gross hematuria , CKD stage 5, hydronephrosis of left kidney, chronic saddle pulmonary embolism, anemia, polycythemia vera   Pt's problem list also includes PMH of  has a past medical history of Anxiety, Bowel obstruction ([address] ), Chronic kidney disease (CKD), stage IV (severe) ([address] ), Chronic thrombosis of subclavian vein ([address] ), Circulation problem, Compression fracture of cervical spine ([address] ), Hernia of abdominal cavity, History of kidney problems, Hydronephrosis, Hypertension, IBS (irritable bowel syndrome), Incontinence, Lung mass, Polycythemia vera ([address] ), Pulmonary embolism ([address] ) (2014), Shingles, and Urinary tract infection    At baseline pt was completing I with ADL's/IADL's, no AD with functional ambulation, +drives  Pt lives with autistic son in a [facility] with [facility]  Currently pt requires min a for overall ADLS (mod a for toileting) and *S with RW for functional mobility/transfers  Pt currently presents with impairments in the following categories -steps to enter environment, limited home support, difficulty performing ADLS and difficulty performing IADLS  activity tolerance and endurance   These impairments, as well as pt's fatigue, decreased caregiver support and risk for falls limit pt's ability to safely engage in all baseline areas of occupation, includingbathing, dressing, toileting, functional mobility/transfers, community mobility, laundry , driving, house maintenance, medication management, meal prep, cleaning, social participation  and leisure activities  The patient's raw score on the AM-PAC Daily Activity inpatient short form is 19, standardized score is 40 22, greater than 39 4  Patients at this level are likely to benefit from discharge to home  Please refer to the recommendation of the Occupational Therapist for safe discharge planning  From OT standpoint, recommend home OT and continue to assess DME (Pt reports no needs at this time) upon D/C  OT will continue to follow to address the below stated goals  OT Discharge Recommendation: Home with home health rehabilitation  OT - OK to Discharge:  Yes

## 2021-05-12 NOTE — PHYSICAL THERAPY NOTE
Physical Therapy Evaluation    Patient's Name: Michael Huber    Admitting Diagnosis  Hydronephrosis [N13 30]  Blood in urine [R31 9]  Hematuria [R31 9]    Problem List  Patient Active Problem List   Diagnosis    Chronic saddle pulmonary embolism (HCC)    Bladder mass    [facility] bowel obstruction ([facility]    Obstructive uropathy    Secondary hyperparathyroidism of renal origin ([facility] )    Hypertension    Polycythemia vera ([facility]    Intraventricular hemorrhage (HCC)    Anemia in CKD (chronic kidney disease)    Mass of urethra    Stage 5 chronic kidney disease not on chronic dialysis ([facility] )    Thoracic compression fracture (HCC)    Benign neoplasm of supratentorial region of brain ([facility] [facility]    Meningioma ([facility]    Inverted T wave    Polycythemia    History of Clostridioides difficile colitis    History of shingles    Depression    Adult BMI 39 0-39 9 kg/sq m    Chronic thrombosis of subclavian vein (HCC)    Hyperlipemia    Gross hematuria    Hydronephrosis of left kidney    Anemia    Constipation       Past Medical History  Past Medical History:   Diagnosis Date    Anxiety     Bowel obstruction (HCC)     Chronic kidney disease (CKD), stage IV (severe) (HCC)     stage IV    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    Circulation problem     Compression fracture of cervical spine (HCC)     Hernia of abdominal cavity     History of kidney problems     Hydronephrosis     Hypertension     IBS (irritable bowel syndrome)     Incontinence     Lung mass     Improving on PET/CT 1/2016    Polycythemia vera (HCC)     Pulmonary embolism ([facility] [facility] ) 2014    Shingles     Urinary tract infection        Past Surgical History  Past Surgical History:   Procedure Laterality Date    ABDOMINAL ADHESION SURGERY N/A 8/9/2020    Procedure: LYSIS ADHESIONS;  Surgeon: Markus Osler, DO;  [facility];  Service: General    BLADDER SUSPENSION      BOTOX INJECTION N/A 7/27/2016    Procedure: BOTOX INJECTION ;  Surgeon: Shoshana Gil MD;  [facility];  Service:     CHOLECYSTECTOMY N/A     COLONOSCOPY      CYSTECTOMY, RADICAL WITH ILEOCONDUIT N/A 10/4/2016    Procedure: Odette Ramos WITH ILEAL CONDUIT ;  Surgeon: Shoshana Gil MD;  [facility];  Service:    Shoaib Alexander W/ RETROGRADES Bilateral 7/27/2016    Procedure: Kaylene Morin; RETROGRADE PYELOGRAM ;  Surgeon: Shoshana Gil MD;  [facility];  Service:     HERNIA REPAIR      IR AV FISTULAGRAM/GRAFTOGRAM  2/10/2021    IR NEPHROSTOMY TUBE PLACEMENT  5/10/2021    IR NON-TUNNELED CENTRAL LINE PLACEMENT  7/17/2020    IR NON-TUNNELED CENTRAL LINE PLACEMENT  8/14/2020    LAPAROTOMY N/A 8/9/2020    Procedure: LAPAROTOMY EXPLORATORY, PARASTOMAL HERNIA REPAIR WITH MESH;  Surgeon: Varun Lopez DO;  [facility];  Service: General    MA ANASTOMOSIS,AV,ANY SITE Right 1/5/2021    Procedure: Creation of right brachiobasilic fistula; Surgeon: Bruce Chairez MD;  [facility];  Service: Vascular    MA COLONOSCOPY FLX DX W/COLLJ SPEC WHEN PFRMD N/A 8/31/2016    Procedure: COLONOSCOPY;  Surgeon: Charlotte Escobar MD;  [facility];   Service: Gastroenterology    MA CYSTOSCOPY,INSERT URETERAL STENT Bilateral 7/27/2016    Procedure: STENT INSERTION; EXCISION OF MESH ;  Surgeon: Shoshana Gil MD;  [facility];  Service: Urology    TONSILLECTOMY      TUBAL LIGATION      WISDOM TOOTH EXTRACTION          05/12/21 0912   PT Last Visit   PT Visit Date 05/12/21   Note Type   Note type Evaluation   Pain Assessment   Pain Assessment Tool Pain Assessment not indicated - pt denies pain   Pain Score No Pain   Home Living   Type of [address] Two level;Stairs to enter with rails  ([address])   Home Equipment Walker;Cane   Prior Function   Level of [place] Independent with ADLs and functional mobility   Lives With Son  (autistic son, pt primary caregiver for son, no physical assist required)   ADL Assistance Independent   IADLs Independent  (+ )   Falls in the last 6 months 1 to 4  (outside resulting in L knee injury)   Comments Pt states she uses a straight cane for community ambulation, no AD within the home  Restrictions/Precautions   Weight Bearing Precautions Per Order No   Other Precautions Fall Risk;Multiple lines  (L nephrostomy)   General   Family/Caregiver Present No   Cognition   Overall Cognitive Status WFL   Orientation Level Oriented X4   Following Commands Follows all commands and directions without difficulty   Comments Pt requires extra time, cooperative throughout   RLE Assessment   RLE Assessment WFL  (Grossly 4/5)   LLE Assessment   LLE Assessment WFL  (Grossly at least 3/5, no resistance due to L knee injury )   Light Touch   RLE Light Touch Grossly intact   LLE Light Touch Grossly intact   Bed Mobility   Additional Comments Pt OOB in chair upon PT arrival   Transfers   Sit to Stand 4  Minimal assistance   Additional items Assist x 1; Increased time required;Verbal cues   Stand to Sit 5  Supervision   Additional items Assist x 1; Increased time required;Verbal cues   Additional Comments with RW   Ambulation/Elevation   Gait pattern Decreased foot clearance; Excessively slow; Short stride; Improper Weight shift   Gait Assistance 5  Supervision   Additional items Assist x 1   Assistive Device Rolling walker   Distance 100 ft, limited by fatigue   Balance   Static Sitting Good   Dynamic Sitting Fair +   Static Standing Fair   Dynamic Standing Fair -   Ambulatory Fair -   Activity Tolerance   Activity Tolerance Patient limited by fatigue   Medical Staff Made Aware OT, Linda   Nurse Made Aware RN updated   Assessment   Prognosis Good   Problem List Decreased strength;Decreased endurance; Impaired balance;Decreased mobility; Decreased safety awareness   Assessment Pt is a 77 y o  female seen for PT evaluation s/p admit to [facility] on 5/8/2021   Pt was admitted with a primary dx of: gross hematuria s/p L nephrostomy tube placement on 5/11  PT now consulted for assessment of mobility and d/c needs  Pt with Up and OOB as tolerated  orders  Pts current comorbidities effecting treatment include: CKD, Saddle PE, Polycythemia vera, obstructive uropathy  Pts current clinical presentation is Unstable/ Unpredictable (high complexity) due to Ongoing medical management for primary dx, Increased reliance on more restrictive AD compared to baseline, Decreased activity tolerance compared to baseline, Fall risk, Trending lab values  Prior to admission, pt was independent with use of straight cane for community ambulation, pt resides with autistic son  Upon evaluation, pt currently is requiring Nathan for transfers and supervision for ambulation 100 ft w/ RW  Pt presents at PT eval functioning below baseline and currently w/ overall mobility deficits 2* to: BLE weakness, impaired balance, decreased endurance, gait deviations, decreased activity tolerance compared to baseline, decreased functional mobility tolerance compared to baseline, decreased safety awareness, fall risk  Pt currently at a fall risk 2* to impairments listed above  Pt will continue to benefit from skilled acute PT interventions to address stated impairments; to maximize functional mobility; for ongoing pt/ family training; and DME needs  At conclusion of PT session pt returned back in chair with phone and call bell within reach  Pt denies any further questions at this time  Recommend home with increased family support (states she has another son locally) and HHPT upon hospital D/C  Goals   Patient Goals to go home   STG Expiration Date 05/26/21   Short Term Goal #1 In 14 days pt will be able to: 1  Demonstrate ability to perform all aspects of bed mobility independently to increase functional independence  2  Perform functional transfers with LRAD independently to facilitate safe return to previous living environment    3  Ambulate 150 ft with LRAD independently with stable vitals to improve safety with household distances and reduce fall risk  4  Improve LE strength grades by 1 to increase ease of functional mobility with transfers and gait  5  Pt will demonstrate improved balance by one grade in order to decrease risk of falls  6  Climb 12 steps with supervision and 1 HR to simulate home to bedroom  PT Treatment Day 0   Plan   Treatment/Interventions Functional transfer training;LE strengthening/ROM; Therapeutic exercise; Endurance training;Elevations; Patient/family training;Bed mobility; Equipment eval/education;Gait training   PT Frequency Other (Comment)  (3-5x/week)   Recommendation   PT Discharge Recommendation Home with home health rehabilitation   [place]   Change/add to [email or web address]?  No   PT - OK to Discharge No  (pending stair trial)   AM-PAC Basic Mobility Inpatient   Turning in Bed Without Bedrails 4   Lying on Back to Sitting on Edge of Flat Bed 3   Moving Bed to Chair 3   Standing Up From Chair 3   Walk in Room 3   Climb 3-5 Stairs 3   Basic Mobility Inpatient Raw Score 19   Basic Mobility Standardized Score 42 48     Pascual Mason, PT, DPT

## 2021-05-12 NOTE — PLAN OF CARE
Problem: Potential for Falls  Goal: Patient will remain free of falls  Description: INTERVENTIONS:  - Assess patient frequently for physical needs  -  Identify cognitive and physical deficits and behaviors that affect risk of falls    -  Walker fall precautions as indicated by assessment   - Educate patient/family on patient safety including physical limitations  - Instruct patient to call for assistance with activity based on assessment  - Modify environment to reduce risk of injury  - Consider OT/PT consult to assist with strengthening/mobility  Outcome: Progressing     Problem: PAIN - ADULT  Goal: Verbalizes/displays adequate comfort level or baseline comfort level  Description: Interventions:  - Encourage patient to monitor pain and request assistance  - Assess pain using appropriate pain scale  - Administer analgesics based on type and severity of pain and evaluate response  - Implement non-pharmacological measures as appropriate and evaluate response  - Consider cultural and social influences on pain and pain management  - Notify physician/advanced practitioner if interventions unsuccessful or patient reports new pain  Outcome: Progressing     Problem: INFECTION - ADULT  Goal: Absence or prevention of progression during hospitalization  Description: INTERVENTIONS:  - Assess and monitor for signs and symptoms of infection  - Monitor lab/diagnostic results  - Monitor all insertion sites, i e  indwelling lines, tubes, and drains  - Monitor endotracheal if appropriate and nasal secretions for changes in amount and color  - Walker appropriate cooling/warming therapies per order  - Administer medications as ordered  - Instruct and encourage patient and family to use good hand hygiene technique  - Identify and instruct in appropriate isolation precautions for identified infection/condition  Outcome: Progressing  Goal: Absence of fever/infection during neutropenic period  Description: INTERVENTIONS:  - Monitor WBC    Outcome: Progressing     Problem: SAFETY ADULT  Goal: Patient will remain free of falls  Description: INTERVENTIONS:  - Assess patient frequently for physical needs  -  Identify cognitive and physical deficits and behaviors that affect risk of falls    -  [place] fall precautions as indicated by assessment   - Educate patient/family on patient safety including physical limitations  - Instruct patient to call for assistance with activity based on assessment  - Modify environment to reduce risk of injury  - Consider OT/PT consult to assist with strengthening/mobility  Outcome: Progressing  Goal: Maintain or return to baseline ADL function  Description: INTERVENTIONS:  -  Assess patient's ability to carry out ADLs; assess patient's baseline for ADL function and identify physical deficits which impact ability to perform ADLs (bathing, care of mouth/teeth, toileting, grooming, dressing, etc )  - Assess/evaluate cause of self-care deficits   - Assess range of motion  - Assess patient's mobility; develop plan if impaired  - Assess patient's need for assistive devices and provide as appropriate  - Encourage maximum independence but intervene and supervise when necessary  - Involve family in performance of ADLs  - Assess for home care needs following discharge   - Consider OT consult to assist with ADL evaluation and planning for discharge  - Provide patient education as appropriate  Outcome: Progressing  Goal: Maintain or return mobility status to optimal level  Description: INTERVENTIONS:  - Assess patient's baseline mobility status (ambulation, transfers, stairs, etc )    - Identify cognitive and physical deficits and behaviors that affect mobility  - Identify mobility aids required to assist with transfers and/or ambulation (gait belt, sit-to-stand, lift, walker, cane, etc )  - [place] fall precautions as indicated by assessment  - Record patient progress and toleration of activity level on Mobility SBAR; progress patient to next Phase/Stage  - Instruct patient to call for assistance with activity based on assessment  - Consider rehabilitation consult to assist with strengthening/weightbearing, etc   Outcome: Progressing     Problem: DISCHARGE PLANNING  Goal: Discharge to home or other facility with appropriate resources  Description: INTERVENTIONS:  - Identify barriers to discharge w/patient and caregiver  - Arrange for needed discharge resources and transportation as appropriate  - Identify discharge learning needs (meds, wound care, etc )  - Arrange for interpretive services to assist at discharge as needed  - Refer to Case Management Department for coordinating discharge planning if the patient needs post-hospital services based on physician/advanced practitioner order or complex needs related to functional status, cognitive ability, or social support system  Outcome: Progressing     Problem: Knowledge Deficit  Goal: Patient/family/caregiver demonstrates understanding of disease process, treatment plan, medications, and discharge instructions  Description: Complete learning assessment and assess knowledge base    Interventions:  - Provide teaching at level of understanding  - Provide teaching via preferred learning methods  Outcome: Progressing

## 2021-05-12 NOTE — ASSESSMENT & PLAN NOTE
Hgb 7 3; likely 2/2 hematuria coupled with anemia of CKD at baseline (baseline around 9-10)  · Trend CBC  · Will likely provide unit PRBC today given symptoms  · No JENELLE due to PE hx  · Will avoid iron as well given ? infection

## 2021-05-12 NOTE — PROGRESS NOTES
Progress Note - Urology  Eliu Khanna 1945, 77 y o  female MRN: [id number]    Unit/Bed#: [facility] 803-01 Encounter: [id number]     Assessment and Plan:  1  Left-sided hydronephrosis  - S/p left nephrostomy tube placement POD#2  - Maintain percutaneous drainage for several weeks, then re-evaluate with antegrade study; potential internalization if possible, otherwise will require chronic percutaneous tube drainage     2  Acute kidney injury  - Creatinine worse today 3 66, previously 3 62  - Continue to monitor creatinine  - Continue hydration     3  Gross hematuria  - Monitor H/H, transfuse PRN  - Continue to hold eliquis and ASA       Subjective:   HPI:  Eliu Khanna is a 77year old female now s/p left nephrostomy tube placement POD#2  Today she is resting comfortably, no complaints of pain       H&H 7 3 and 23 4  Creatinine today 3 66, previously 3 62  Review of Systems   Constitutional: Negative for activity change, appetite change, chills and fever  HENT: Negative for congestion and trouble swallowing  Respiratory: Negative for cough and shortness of breath  Cardiovascular: Negative for chest pain, palpitations and leg swelling  Gastrointestinal: Negative for abdominal pain, constipation, diarrhea, nausea and vomiting  Genitourinary: Negative for difficulty urinating, dysuria, flank pain, frequency, hematuria and urgency  Musculoskeletal: Negative for back pain and gait problem  Skin: Negative for wound  Allergic/Immunologic: Negative for immunocompromised state  Neurological: Negative for dizziness and syncope  Hematological: Does not bruise/bleed easily  Psychiatric/Behavioral: Negative for confusion  All other systems reviewed and are negative  Objective:  Nursing Rounds:   Vitals: Blood pressure 139/76, pulse 77, temperature 98 6 °F (37 °C), resp  rate 16, height 5' (1 524 m), weight 89 5 kg (197 lb 4 8 oz), SpO2 93 %, not currently breastfeeding  ,Body mass index is 38 53 kg/m²  Physical Exam  Constitutional:       General: She is not in acute distress  Appearance: Normal appearance  She is not ill-appearing  HENT:      Head: Normocephalic  Pulmonary:      Effort: Pulmonary effort is normal    Skin:     General: Skin is warm and dry  Neurological:      General: No focal deficit present  Mental Status: She is alert and oriented to person, place, and time  Psychiatric:         Mood and Affect: Mood normal          Behavior: Behavior normal          Imaging:    Imaging reviewed - both report and images personally reviewed  Labs:  Recent Labs     05/10/21  0511 05/11/21  0441 05/12/21  0443   WBC 5 17 8 71 6 06       Recent Labs     05/10/21  0511 05/11/21  0441 05/12/21  0443   HGB 8 0* 7 5* 7 3*     Recent Labs     05/10/21  0511 05/11/21  0441 05/12/21  0443   HCT 26 1* 24 7* 23 4*     Recent Labs     05/10/21  0511 05/11/21  0441 05/12/21  0443   CREATININE 3 52* 3 62* 3 66*         History:    Past Medical History:   Diagnosis Date    Anxiety     Bowel obstruction (HCC)     Chronic kidney disease (CKD), stage IV (severe) (HCC)     stage IV    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    Circulation problem     Compression fracture of cervical spine (HCC)     Hernia of abdominal cavity     History of kidney problems     Hydronephrosis     Hypertension     IBS (irritable bowel syndrome)     Incontinence     Lung mass     Improving on PET/CT 1/2016    Polycythemia vera ([facility] [address] [facility]     Pulmonary embolism ([facility] [address] ) 2014    Shingles     Urinary tract infection      Social History     Socioeconomic History    Marital status:       Spouse name: None    Number of children: None    Years of education: None    Highest education level: None   Occupational History    None   Social Needs    Financial resource strain: None    Food insecurity     Worry: None     Inability: None    Transportation needs     Medical: None Non-medical: None   Tobacco Use    Smoking status: Never Smoker    Smokeless tobacco: Never Used    Tobacco comment: n/a   Substance and Sexual Activity    Alcohol use: Not Currently     Frequency: Never     Binge frequency: Never     Comment: n/s    Drug use: Not Currently     Comment: n/a    Sexual activity: Not Currently     Partners: Male   Lifestyle    Physical activity     Days per week: None     Minutes per session: None    Stress: None   Relationships    Social connections     Talks on phone: None     Gets together: None     Attends [employer] service: None     Active member of club or organization: None     Attends meetings of clubs or organizations: None     Relationship status: None    Intimate partner violence     Fear of current or ex partner: None     Emotionally abused: None     Physically abused: None     Forced sexual activity: None   Other Topics Concern    None   Social History Narrative    None     Past Surgical History:   Procedure Laterality Date    ABDOMINAL ADHESION SURGERY N/A 8/9/2020    Procedure: LYSIS ADHESIONS;  Surgeon: Ambika Powell DO;  [facility];  Service: General    BLADDER SUSPENSION      BOTOX INJECTION N/A 7/27/2016    Procedure: BOTOX INJECTION ;  Surgeon: Odilia Benitez MD;  [facility];  Service:     CHOLECYSTECTOMY N/A     COLONOSCOPY      CYSTECTOMY, RADICAL WITH ILEOCONDUIT N/A 10/4/2016    Procedure: SUPRATRIGONAL CYSTECTOMY WITH ILEAL CONDUIT ;  Surgeon: Odilia Benitez MD;  [facility];  Service:    Caro Simms W/ RETROGRADES Bilateral 7/27/2016    Procedure: Marino Yun; RETROGRADE PYELOGRAM ;  Surgeon: Odilia Benitez MD;  [facility];  Service:     HERNIA REPAIR      IR AV FISTULAGRAM/GRAFTOGRAM  2/10/2021    IR NEPHROSTOMY TUBE PLACEMENT  5/10/2021    IR NON-TUNNELED CENTRAL LINE PLACEMENT  7/17/2020    IR NON-TUNNELED CENTRAL LINE PLACEMENT  8/14/2020    LAPAROTOMY N/A 8/9/2020    Procedure: LAPAROTOMY EXPLORATORY, PARASTOMAL HERNIA REPAIR WITH MESH;  Surgeon: Nestor Martinez DO;  [facility];  Service: General    ND ANASTOMOSIS,AV,ANY SITE Right 1/5/2021    Procedure: Creation of right brachiobasilic fistula; Surgeon: Kristen Chairez MD;  [facility];  Service: Vascular    ND COLONOSCOPY FLX DX W/COLLJ SPEC WHEN PFRMD N/A 8/31/2016    Procedure: COLONOSCOPY;  Surgeon: Rambo Man MD;  [facility];   Service: Gastroenterology    ND CYSTOSCOPY,INSERT URETERAL STENT Bilateral 7/27/2016    Procedure: STENT INSERTION; EXCISION OF MESH ;  Surgeon: Rad Aquino MD;  [facility];  Service: Urology    TONSILLECTOMY      TUBAL LIGATION      WISDOM TOOTH EXTRACTION       Family History   Problem Relation Age of Onset    Cancer Mother         small cell cancer     Heart disease Father     COPD Father     Heart disease Brother     Nephrolithiasis Brother        Linda Leggett [place]  Date: 5/12/2021 Time: 11:08 AM

## 2021-05-12 NOTE — PROGRESS NOTES
[address] [facility]  Progress Note - El Chamberlain [birth year], 77 y o  female MRN: [id number]  Unit/Bed#: [facility] 803-01 Encounter: [id number]  Primary Care Provider: Pancho Reinoso MD   Date and time admitted to hospital: 5/8/2021 11:18 AM    * Gross hematuria  Assessment & Plan  Patient presented to ED for gross hematuria  Has PMHx of CKD Stage 5, obstructive uropathy s/p cystectomy with ilial conduit  · CT abdomin pelvis: New left hydronephrosis with perinephric stranding   · Suggests the possibility of more acute obstruction and/or pyelonephritis  · Urology consulted, input appreciated   · IR following, s/p L nephrostomy tube placement on 5/11 with Dr Maxine Rabago (IR)  · Monitor H/H, transfuse PRN--see below, will give unit of PRBC today  · Urine cx pre-operatively with mixed contaminants x3  · During IR procedure was noted to have purulent urine, started on abx   · Will complete x5 days  · Holding eliquis and asa per urology  IR stated ok to resume, will await urology input  · PT/OT input appreciated     Anemia  Assessment & Plan  Hgb 7 3; likely 2/2 hematuria coupled with anemia of CKD at baseline (baseline around 9-10)  · Trend CBC  · Will likely provide unit PRBC today given symptoms  · No JENELLE due to PE hx  · Will avoid iron as well given ? infection    Hydronephrosis of left kidney  Assessment & Plan  IR and urology consulted  · S/P Perc neph tube on 5/11 with IR   · Flush with 10 mL NSS BID  · F/u IR q3 months for routine tube exchanges  · Will need ongoing nephrostomy tube drainage for at least several weeks and then re-eval with antegrade study    Chronic saddle pulmonary embolism ([address] )  Assessment & Plan  Many years ago per patient  · Eliquis held due to hematuria and need for nephrostomy tube placement  · Awaiting urology input on resuming eliquis/asa  · IR stated ok to resume from their end     Constipation  Assessment & Plan  · Increase bowel regimen    Hyperlipemia  Assessment & Plan  · Continue statin    Stage 5 chronic kidney disease not on chronic dialysis [facility])  Assessment & Plan  Lab Results   Component Value Date    EGFR 12 05/12/2021    EGFR 12 05/11/2021    EGFR 12 05/10/2021    CREATININE 3 66 (H) 05/12/2021    CREATININE 3 62 (H) 05/11/2021    CREATININE 3 52 (H) 05/10/2021   Estimated Creatinine Clearance: 13 4 mL/min (A) (by C-G formula based on SCr of 3 66 mg/dL (H))  Cr near baseline 3 4-3 5, follows with Dr Henrique Yeung  · No significant improvement s/p nephrostomy tube  · Renal input appreciated   · Monitor BMP     Polycythemia vera ([address] )  Assessment & Plan  Patient follows up with outpatient Hematology Oncology, currently on Jakafi 10 mg Oral Daily  · Brought medication in from home   · Monitor labs     Obstructive uropathy  Assessment & Plan  S/p ileostomy for nonfunctioning bladder and chronic hydro  · Urology following         VTE Pharmacologic Prophylaxis: VTE Score: 6 Moderate Risk (Score 3-4) - Pharmacological DVT Prophylaxis Contraindicated  Sequential Compression Devices Ordered  Patient Centered Rounds: I performed bedside rounds with nursing staff today  Discussions with Specialists or Other Care Team Provider: IR, Urology, CM, Attending Laurie Carey    Education and Discussions with Family / Patient: Updated  (son) via phone  Time Spent for Care: 30 minutes  More than 50% of total time spent on counseling and coordination of care as described above  Current Length of Stay: 4 day(s)  Current Patient Status: Inpatient   Certification Statement: The patient will continue to require additional inpatient hospital stay due to ongoing as above   Discharge Plan: Anticipate discharge in 24-48 hrs to discharge location to be determined pending rehab evaluations  Code Status: Level 1 - Full Code    Subjective:   Doing okay today  OOB in chair  No BM yet which concerns her  Urine clearing  Appetite good   Overall tired and fatigued  No SOB  Objective:     Vitals:   Temp (24hrs), Av 4 °F (36 9 °C), Min:98 2 °F (36 8 °C), Max:98 6 °F (37 °C)    Temp:  [98 2 °F (36 8 °C)-98 6 °F (37 °C)] 98 6 °F (37 °C)  HR:  [77] 77  Resp:  [16-17] 16  BP: (127-139)/(70-76) 139/76  SpO2:  [93 %] 93 %  Body mass index is 38 53 kg/m²  Input and Output Summary (last 24 hours): Intake/Output Summary (Last 24 hours) at 2021 1030  Last data filed at 2021 0900  Gross per 24 hour   Intake 1020 ml   Output 1160 ml   Net -140 ml       Physical Exam:   Physical Exam  Vitals signs and nursing note reviewed  Constitutional:       Appearance: She is obese  Comments: RUE fistula, on RA    Cardiovascular:      Rate and Rhythm: Normal rate and regular rhythm  Pulmonary:      Effort: No respiratory distress  Abdominal:      General: Bowel sounds are normal  There is no distension  Tenderness: There is no abdominal tenderness  Skin:     Coloration: Skin is pale  Comments: Bruising noted to b/l UE    Neurological:      Mental Status: She is oriented to person, place, and time     Psychiatric:         Mood and Affect: Mood normal           Additional Data:     Labs:  Results from last 7 days   Lab Units 21  0443 21  0441   WBC Thousand/uL 6 06 8 71   HEMOGLOBIN g/dL 7 3* 7 5*   HEMATOCRIT % 23 4* 24 7*   PLATELETS Thousands/uL 331 353   BANDS PCT % 4  --    NEUTROS PCT %  --  86*   LYMPHS PCT %  --  6*   LYMPHO PCT % 7*  --    MONOS PCT %  --  6   MONO PCT % 4  --    EOS PCT % 1 0     Results from last 7 days   Lab Units 21  0443  21  1318   SODIUM mmol/L 136   < >  --    POTASSIUM mmol/L 4 5   < >  --    CHLORIDE mmol/L 109*   < >  --    CO2 mmol/L 20*   < >  --    BUN mg/dL 44*   < >  --    CREATININE mg/dL 3 66*   < >  --    ANION GAP mmol/L 7   < >  --    CALCIUM mg/dL 8 6   < >  --    ALBUMIN g/dL  --   --  2 9*   TOTAL BILIRUBIN mg/dL  --   --  0 56   ALK PHOS U/L  --   --  85   ALT U/L  -- --  12   AST U/L  --   --  16   GLUCOSE RANDOM mg/dL 95   < >  --     < > = values in this interval not displayed  Results from last 7 days   Lab Units 05/11/21  0441   INR  1 39*                   Lines/Drains:  Invasive Devices     Peripheral Intravenous Line            Peripheral IV 05/12/21 Dorsal (posterior); Left Wrist less than 1 day    Peripheral IV 05/12/21 Left Antecubital less than 1 day          Line            Hemodialysis AV Fistula Right Upper arm -- days          Drain            Urostomy Ileal conduit RUQ 1680 days    Nephrostomy Left 1 8 Fr  1 day                      Imaging: No pertinent imaging reviewed      Recent Cultures (last 7 days):   Results from last 7 days   Lab Units 05/10/21  1542 05/10/21  1530 05/08/21  1338   GRAM STAIN RESULT  1+ Gram positive cocci in pairs*  1+ Gram negative rods*  No polys seen*  --   --    URINE CULTURE   --  >100,000 cfu/ml  >100,000 cfu/ml    BODY FLUID CULTURE, STERILE  4+ Growth of Enterococcus faecalis*  4+ Growth of Morganella morganii*  --   --        Last 24 Hours Medication List:   Current Facility-Administered Medications   Medication Dose Route Frequency Provider Last Rate    acetaminophen  650 mg Oral Q6H PRN Freada Simper, DO      atorvastatin  40 mg Oral Daily With [facility], DO      bisacodyl  10 mg Rectal Daily Abigail Lim PA-C      calcitriol  0 25 mcg Oral Every Other Day Freada Simper, DO      cefTRIAXone  1,000 mg Intravenous Q24H Abigail Lim PA-C 1,000 mg (05/12/21 0919)    cholecalciferol  1,000 Units Oral Daily Freada Simper, DO      citalopram  20 mg Oral Daily Freada Simper, DO      docusate sodium  100 mg Oral BID Abigail Lim PA-C      fentaNYL  25 mcg Intravenous Q5 Min PRN Cecelia Cherry CRNA      levofloxacin  500 mg Intravenous Once Bing Arana MD      levothyroxine  75 mcg Oral Early Morning Freada Simper, DO      melatonin  3 mg Oral HS Freada Simper, DO      metoprolol tartrate  25 mg Oral BID Esther Carmichael,       ondansetron  4 mg Intravenous Once PRN Isidro Roy CRNA      oxyCODONE  2 5 mg Oral Q4H PRN Roberta Abad PA-C      polyethylene glycol  17 g Oral BID Miguel Whitman PA-C      ruxolitinib  10 mg Oral Daily Tristin Flores MD      saccharomyces boulardii  250 mg Oral Daily Esther Carmichael DO      simethicone  80 mg Oral Q6H PRN Miguel Whitman PA-C      sodium bicarbonate  650 mg Oral BID after meals Dipika Resendez PA-C          Today, Patient Was Seen By: Miguel Whitman PA-C    **Please Note: This note may have been constructed using a voice recognition system  **

## 2021-05-12 NOTE — PROGRESS NOTES
Progress Note - Nephrology   Jose Parson 77 y o  female MRN: [id number]  Unit/Bed#: [facility] 803-01 Encounter: [id number]    ASSESSMENT AND PLAN:  61-year-old female with history cystectomy ileal conduit and chronic hydronephrosis with advanced chronic kidney disease stage 5 followed by Dr Jagdeep Gallagher:  She now presents with gross hematuria, CT scan demonstrated left hydronephrosis and perinephric stranding and more acute obstruction possible pyelonephritis  Status post antibiotics and left PCN placed 5/10     1  CKD stage 5:  -baseline creatinine 3 5-3 6 followed by Dr Jagdeep Gallagher  -etiology felt secondary to obstructive uropathy with functional solitary right kidney frequent episodes of acute kidney injury    -UA with innumerable RBCs WBCs and bacteria  CURRENT CREATININE:  3 66 ESSENTIALLY AT BASES  Recommend:  -monitor renal function   -follow-up regarding the AV fistula  -avoid nephrotoxic agents such as NSAIDs  -avoid hypotension     2  Blood pressure is acceptable at this time:  Hold parameters  3  Electrolytes:  Metabolic acidosis to be treated with sodium bicarbonate  4  MBD:   magnesium and phosphorus both acceptable  On calcitriol 3 times a week  5  Anemia:  Hemoglobin 7 3 most likely secondary to chronic disease and chronic kidney disease:  NO JENELLE DUE TO HISTORY OF PE, transfuse as needed for hemoglobin less than 7 0  · Low iron studies:  I will give oral iron        Subjective: The patient is asymptomatic  She had a bowel movement  No chest pain or shortness of breath  No nausea vomiting or diarrhea  Objective:     Vitals: Blood pressure 139/76, pulse 77, temperature 98 6 °F (37 °C), resp  rate 16, height 5' (1 524 m), weight 89 5 kg (197 lb 4 8 oz), SpO2 93 %, not currently breastfeeding  ,Body mass index is 38 53 kg/m²      Weight (last 2 days)     None            Intake/Output Summary (Last 24 hours) at 5/12/2021 1233  Last data filed at 5/12/2021 1101  Gross per 24 hour   Intake 1240 ml   Output 1160 ml   Net 80 ml            Physical Exam: General:  No acute distress  Skin:  No acute rash  Eyes:  No scleral icterus and noninjected  ENT:  Moist mucous membranes  Neck:  Supple, no jugular venous distention, trachea midline, overall appearance is normal  Chest:  Clear to auscultation  Back:  Left PCN in place bandage dry  CVS:  Regular rate and rhythm, without a rub or gallops  Abdomen:  Normal bowel sounds, soft and nontender and nondistended; ileal conduit in place with good urine output  Extremities:  No edema, and no cyanosis, no significant arthritic changes  Neuro:  No gross focality  Psych:  Alert and oriented and appropriate                Medications:    Scheduled Meds:  Current Facility-Administered Medications   Medication Dose Route Frequency Provider Last Rate    acetaminophen  650 mg Oral Q6H PRN Apurva Fischer, DO      atorvastatin  40 mg Oral Daily With [employer], DO      bisacodyl  10 mg Rectal Daily Modesto Sun PA-C      calcitriol  0 25 mcg Oral Every Other Day Zandra Hanger, DO      cefTRIAXone  1,000 mg Intravenous Q24H Modesto Sun PA-C 1,000 mg (05/12/21 0919)    cholecalciferol  1,000 Units Oral Daily Zandra Hanger, DO      citalopram  20 mg Oral Daily Zandra Hanger, DO      docusate sodium  100 mg Oral BID Modesto Sun PA-C      fentaNYL  25 mcg Intravenous Q5 Min PRN Edith Terry CRNA      levofloxacin  500 mg Intravenous Once Kalyan Fan MD      levothyroxine  75 mcg Oral Early Morning Zandra Hanger, DO      melatonin  3 mg Oral HS Zandra Hanger, DO      metoprolol tartrate  25 mg Oral BID Zandra Hanger, DO      ondansetron  4 mg Intravenous Once PRN Edith Terry CRNA      oxyCODONE  2 5 mg Oral Q4H PRN Roberta Abad PA-C      polyethylene glycol  17 g Oral BID Modesto Sun PA-C      ruxolitinib  10 mg Oral Daily Michael Levin MD      saccharomyces boulardii  250 mg Oral Daily Zandra Hanger, DO      simethicone  80 mg Oral Q6H PRN [place] DIMITRI [place]      sodium bicarbonate  650 mg Oral BID after meals Radha Ibarra PA-C         PRN Meds:   acetaminophen    fentaNYL    ondansetron    oxyCODONE    simethicone    Continuous Infusions:     Lab, Imaging and other studies: I have personally reviewed pertinent labs    Laboratory Results:  Results from last 7 days   Lab Units 05/12/21  0443 05/11/21  0441 05/10/21  0511 05/09/21  0528 05/08/21  1831 05/08/21  1219   WBC Thousand/uL 6 06 8 71 5 17 4 70  --  5 35   HEMOGLOBIN g/dL 7 3* 7 5* 8 0* 8 0* 7 9* 8 7*   HEMATOCRIT % 23 4* 24 7* 26 1* 25 8* 25 7* 27 8*   PLATELETS Thousands/uL 331 353 347 344  --  395*   POTASSIUM mmol/L 4 5 4 7 4 5 4 4  --  5 0   CHLORIDE mmol/L 109* 112* 112* 110*  --  109*   CO2 mmol/L 20* 19* 20* 21  --  19*   BUN mg/dL 44* 38* 44* 43*  --  42*   CREATININE mg/dL 3 66* 3 62* 3 52* 3 57*  --  3 68*   CALCIUM mg/dL 8 6 8 3 8 2* 8 3  --  8 9   MAGNESIUM mg/dL 1 9  --   --  2 0  --   --    PHOSPHORUS mg/dL 3 5  --   --  3 4  --   --      Urinalysis:   Lab Results   Component Value Date    COLORU Brown 05/08/2021    COLORU Yellow 09/09/2015    CLARITYU Turbid 05/08/2021    CLARITYU Cloudy 09/09/2015    SPECGRAV 1 013 05/08/2021    SPECGRAV <=1 005 09/09/2015    PHUR Interference-unable to analyze (A) 05/08/2021    PHUR 6 5 07/14/2020    PHUR 6 0 09/09/2015    LEUKOCYTESUR Interference- unable to analyze (A) 05/08/2021    LEUKOCYTESUR Large (A) 09/09/2015    NITRITE Interference- unable to analyze 05/08/2021    NITRITE Positive (A) 09/09/2015    PROTEINUA 30 (1+) (A) 09/09/2015    GLUCOSEU Interference- unable to analyze 05/08/2021    GLUCOSEU Negative 09/09/2015    KETONESU Interference- unable to analyze (A) 05/08/2021    KETONESU Negative 09/09/2015    BILIRUBINUR Interference- unable to analyze (A) 05/08/2021    BILIRUBINUR Negative 09/09/2015    BLOODU Interference- unable to analyze (A) 05/08/2021    BLOODU Moderate (A) 09/09/2015     ABGs:   Lab Results   Component Value Date    [facility] 7 444 10/04/2016     Radiology review:     Portions of the record may have been created with voice recognition software  Occasional wrong word or "sound a like" substitutions may have occurred due to the inherent limitations of voice recognition software  Read the chart carefully and recognize, using context, where substitutions have occurred

## 2021-05-12 NOTE — ASSESSMENT & PLAN NOTE
Patient presented to ED for gross hematuria  Has PMHx of CKD Stage 5, obstructive uropathy s/p cystectomy with ilial conduit  · CT abdomin pelvis: New left hydronephrosis with perinephric stranding   · Suggests the possibility of more acute obstruction and/or pyelonephritis  · Urology consulted, input appreciated   · IR following, s/p L nephrostomy tube placement on 5/11 with Dr Leti Cortes (IR)  · Monitor H/H, transfuse PRN--see below, will give unit of PRBC today  · Urine cx pre-operatively with mixed contaminants x3  · During IR procedure was noted to have purulent urine, started on abx   · Will complete x5 days  · Holding eliquis and asa per urology   IR stated ok to resume, will await urology input  · PT/OT input appreciated

## 2021-05-12 NOTE — UTILIZATION REVIEW
Continued Stay Review    Date: 5/12/21                         Current Patient Class: IP  Current Level of Care: MS    HPI:75 y o  female initially admitted on 5/8 with gross hematuria, hydronephrosis L kidney, stage 5 CKD not on HD, osbstructive uropathy with ileal conduit  5/10 had IR Nephrostomy tube placed in L side  Assessment/Plan:   POD #2 L nephrostomy tube placement - will keep in place for several weeks  LUANN worsening today  BUN/Creat 44/3  66  Continues on IV hydration, monitor H/H - Hgb down to 7  3  pt will be transfused with 1U PRBCs today  Will be getting oral iron  Is on IV antibiotics for 5 days for purulent urine found during procedure  She has no c/o pain today  Eliquis and ASA both on hold       Vital Signs:   05/12/21 15:28:44  98 5 °F (36 9 °C)  74  18  153/85  108  95 %  --  --  --   05/12/21 13:00:32  97 9 °F (36 6 °C)  76  16  129/77  94  97 %  --  --  --   05/12/21 12:51:52  98 °F (36 7 °C)  --  16  132/77  95  --  --  --  --   05/12/21 1250  98 °F (36 7 °C)  90  16  132/77  --  --  --  --  --   05/12/21 0813  --  --  --  --  --  --  --  None (Room air)  --   05/12/21 07:33:11  98 6 °F (37 °C)  --  16  139/76  97  --  --  --  --   05/12/21 0715  --  --  --  --  --  --  --  None (Room air)  --   05/11/21 22:45:05  98 5 °F (36 9 °C)  77  17  130/72  91  93 %  --  --  --   05/11/21 14:35:24  98 2 °F (36 8 °C)  --  --  127/70  89  --  --  --  --   05/11/21 07:37:10  99 8 °F (37 7 °C)  86  18  122/66  85  96 %  --  None (Room air)  Lying   05/10/21 23:52:30  99 °F (37 2 °C)  84  --  --  --  91 %  --  --  --   05/10/21 2253  --  90  17  120/68  --  91 %  --  --  --   05/10/21 19:12:58  99 5 °F (37 5 °C)  89  17  149/86  107  96 %  --  --  --   05/10/21 17:44:40  98 °F (36 7 °C)  76  18  148/86  107  92 %  --  --  --   05/10/21 1700  97 5 °F (36 4 °C)  72  16  123/71  --  95 %  --  None (Room air)  --   05/10/21 1645  --  76  14  116/64  --  97 %  --  None (Room air)  --   05/10/21 1630 97 3 °F (36 3 °C)Abnormal   78  15  116/86  --  100 %  6 L/min  Simple mask  --   05/10/21 1145  --  --  --  --  --  --  --  None (Room air)  --   05/10/21 08:33:33  99 1 °F (37 3 °C)  --  --  147/86  106  --  --  --  --     Pertinent Labs/Diagnostic Results:     5/10 IR nephrostomy tube placement - Ultrasound and fluoroscopically guided placement of a left 8 Romansh percutaneous nephrostomy  Bloody purulent material in the collecting system    Renal atrophy with distortion of the left renal collecting system and suspicion for distal ureteral stricture          Results from last 7 days   Lab Units 05/12/21  0443 05/11/21  0441 05/10/21  0511 05/09/21  0528 05/08/21  1831 05/08/21  1219   WBC Thousand/uL 6 06 8 71 5 17 4 70  --  5 35   HEMOGLOBIN g/dL 7 3* 7 5* 8 0* 8 0* 7 9* 8 7*   HEMATOCRIT % 23 4* 24 7* 26 1* 25 8* 25 7* 27 8*   PLATELETS Thousands/uL 331 353 347 344  --  395*   NEUTROS ABS Thousands/µL  --  7 44  --   --   --  3 84   BANDS PCT % 4  --   --   --   --   --          Results from last 7 days   Lab Units 05/12/21  0443 05/11/21  0441 05/10/21  0511 05/09/21  0528 05/08/21  1219   SODIUM mmol/L 136 139 139 137 137   POTASSIUM mmol/L 4 5 4 7 4 5 4 4 5 0   CHLORIDE mmol/L 109* 112* 112* 110* 109*   CO2 mmol/L 20* 19* 20* 21 19*   ANION GAP mmol/L 7 8 7 6 9   BUN mg/dL 44* 38* 44* 43* 42*   CREATININE mg/dL 3 66* 3 62* 3 52* 3 57* 3 68*   EGFR ml/min/1 73sq m 12 12 12 12 12   CALCIUM mg/dL 8 6 8 3 8 2* 8 3 8 9   MAGNESIUM mg/dL 1 9  --   --  2 0  --    PHOSPHORUS mg/dL 3 5  --   --  3 4  --      Results from last 7 days   Lab Units 05/08/21  1318   AST U/L 16   ALT U/L 12   ALK PHOS U/L 85   TOTAL PROTEIN g/dL 6 2*   ALBUMIN g/dL 2 9*   TOTAL BILIRUBIN mg/dL 0 56   BILIRUBIN DIRECT mg/dL 0 17         Results from last 7 days   Lab Units 05/12/21  0443 05/11/21  0441 05/10/21  0511 05/09/21  0528 05/08/21  1219   GLUCOSE RANDOM mg/dL 95 100 99 91 100     Results from last 7 days   Lab Units 05/08/21  1219 CK TOTAL U/L 91     Results from last 7 days   Lab Units 05/08/21  1318   TROPONIN I ng/mL <0 02         Results from last 7 days   Lab Units 05/11/21  0441 05/10/21  0842 05/08/21  1219   PROTIME seconds 17 0* 15 9* 19 9*   INR  1 39* 1 27* 1 70*   PTT seconds  --   --  34     Results from last 7 days   Lab Units 05/12/21  0443   FERRITIN ng/mL 237     Results from last 7 days   Lab Units 05/08/21  1338   CLARITY UA  Turbid   COLOR UA  Brown   SPEC GRAV UA  1 013   PH UA  Interference-unable to analyze*   GLUCOSE UA mg/dl Interference- unable to analyze   KETONES UA mg/dl Interference- unable to analyze*   BLOOD UA  Interference- unable to analyze*   PROTEIN UA mg/dl Interference- unable to analyze*   NITRITE UA  Interference- unable to analyze   BILIRUBIN UA  Interference- unable to analyze*   UROBILINOGEN UA E U /dl Interference-unable to analyze   LEUKOCYTES UA  Interference- unable to analyze*   WBC UA /hpf Innumerable*   RBC UA /hpf Innumerable*   BACTERIA UA /hpf Innumerable*   EPITHELIAL CELLS WET PREP /hpf Occasional     Results from last 7 days   Lab Units 05/08/21  1318   ACETAMINOPHEN LVL ug/mL <2*   SALICYLATE LVL mg/dL <3*     Results from last 7 days   Lab Units 05/10/21  1542 05/10/21  1530 05/08/21  1338   GRAM STAIN RESULT  1+ Gram positive cocci in pairs*  1+ Gram negative rods*  No polys seen*  --   --    URINE CULTURE   --  >100,000 cfu/ml  >100,000 cfu/ml    BODY FLUID CULTURE, STERILE  4+ Growth of Enterococcus faecalis*  4+ Growth of Morganella morganii*  --   --      Medications:   Scheduled Medications:  atorvastatin, 40 mg, Oral, Daily With Dinner  bisacodyl, 10 mg, Rectal, Daily  calcitriol, 0 25 mcg, Oral, Every Other Day  cefTRIAXone, 1,000 mg, Intravenous, Q24H  cholecalciferol, 1,000 Units, Oral, Daily  citalopram, 20 mg, Oral, Daily  docusate sodium, 100 mg, Oral, BID  iron polysaccharides, 150 mg, Oral, Daily  levofloxacin, 500 mg, Intravenous, Once  levothyroxine, 75 mcg, Oral, Early Morning  melatonin, 3 mg, Oral, HS  metoprolol tartrate, 25 mg, Oral, BID  polyethylene glycol, 17 g, Oral, BID  ruxolitinib, 10 mg, Oral, Daily  saccharomyces boulardii, 250 mg, Oral, Daily  sodium bicarbonate, 650 mg, Oral, BID after meals      Continuous IV Infusions:       PRN Meds:  acetaminophen, 650 mg, Oral, Q6H PRN  fentaNYL, 25 mcg, Intravenous, Q5 Min PRN  ondansetron, 4 mg, Intravenous, Once PRN  oxyCODONE, 2 5 mg, Oral, Q4H PRN - x 2 5/10  simethicone, 80 mg, Oral, Q6H PRN -x 1 5/11    Discharge Plan: TBD    Network Utilization Review Department  ATTENTION: Please call with any questions or concerns to [phone] and carefully listen to the prompts so that you are directed to the right person  All voicemails are confidential   Tammy Zarate all requests for admission clinical reviews, approved or denied determinations and any other requests to dedicated fax number below belonging to the campus where the patient is receiving treatment   List of dedicated fax numbers for the Facilities:  [address] DENIALS (Administrative/Medical Necessity) [phone]   [address] (Maternity/NICU/Pediatrics) [phone]   [address] [place] [address] [address] [place] [address] [address] [facility] [phone]

## 2021-05-12 NOTE — ASSESSMENT & PLAN NOTE
Many years ago per patient  · Eliquis held due to hematuria and need for nephrostomy tube placement  · Awaiting urology input on resuming eliquis/asa  · IR stated ok to resume from their end

## 2021-05-12 NOTE — OCCUPATIONAL THERAPY NOTE
Occupational Therapy Evaluation     Patient Name: Celia Puente  OSJDG'S Date: 5/12/2021  Problem List  Principal Problem:    Gross hematuria  Active Problems:    Chronic saddle pulmonary embolism ([facility] )    Obstructive uropathy    Polycythemia vera ([facility] )    Stage 5 chronic kidney disease not on chronic dialysis ([facility] )    Hyperlipemia    Hydronephrosis of left kidney    Anemia    Constipation    Past Medical History  Past Medical History:   Diagnosis Date    Anxiety     Bowel obstruction (HCC)     Chronic kidney disease (CKD), stage IV (severe) (HCC)     stage IV    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    Circulation problem     Compression fracture of cervical spine (HCC)     Hernia of abdominal cavity     History of kidney problems     Hydronephrosis     Hypertension     IBS (irritable bowel syndrome)     Incontinence     Lung mass     Improving on PET/CT 1/2016    Polycythemia vera ([facility] )     Pulmonary embolism ([facility] ) 2014    Shingles     Urinary tract infection      Past Surgical History  Past Surgical History:   Procedure Laterality Date    ABDOMINAL ADHESION SURGERY N/A 8/9/2020    Procedure: LYSIS ADHESIONS;  Surgeon: Mary Jo Chan DO;  [facility];  Service: General    BLADDER SUSPENSION      BOTOX INJECTION N/A 7/27/2016    Procedure: BOTOX INJECTION ;  Surgeon: Cathleen Osorio MD;  [facility];  Service:     CHOLECYSTECTOMY N/A     COLONOSCOPY      CYSTECTOMY, RADICAL WITH ILEOCONDUIT N/A 10/4/2016    Procedure: [address] [place] [address] ;  Surgeon: Cathleen Osorio MD;  [facility];  Service:    Veria Quitter W/ RETROGRADES Bilateral 7/27/2016    Procedure: CYSTOSCOPY; RETROGRADE PYELOGRAM ;  Surgeon: Cathleen Osorio MD;  [facility];  Service:     HERNIA REPAIR      IR AV FISTULAGRAM/GRAFTOGRAM  2/10/2021    IR NEPHROSTOMY TUBE PLACEMENT  5/10/2021    IR NON-TUNNELED CENTRAL LINE PLACEMENT  7/17/2020  IR NON-TUNNELED CENTRAL LINE PLACEMENT  8/14/2020    LAPAROTOMY N/A 8/9/2020    Procedure: LAPAROTOMY EXPLORATORY, PARASTOMAL HERNIA REPAIR WITH MESH;  Surgeon: Mark Ferguson DO;  [facility];  Service: General    GA ANASTOMOSIS,AV,ANY SITE Right 1/5/2021    Procedure: Creation of right brachiobasilic fistula; Surgeon: Abhinav Chairez MD;  [facility];  Service: Vascular    GA COLONOSCOPY FLX DX W/COLLJ SPEC WHEN PFRMD N/A 8/31/2016    Procedure: COLONOSCOPY;  Surgeon: Pearl Andrew MD;  Location: [facility]; Service: Gastroenterology    GA CYSTOSCOPY,INSERT URETERAL STENT Bilateral 7/27/2016    Procedure: STENT INSERTION; EXCISION OF MESH ;  Surgeon: Desirae Johnson MD;  [facility];  Service: Urology    TONSILLECTOMY      TUBAL LIGATION      WISDOM TOOTH EXTRACTION           05/12/21 0829   OT Last Visit   OT Visit Date 05/12/21   Note Type   Note type Evaluation   Restrictions/Precautions   Other Precautions Limb alert;Multiple lines; Fall Risk  (Illeal conduit,  hemodialysis fistula, nephrostomy drain)   Pain Assessment   Pain Assessment Tool 0-10   Pain Score No Pain   Home Living   Type of [address] [place] [address] Ave Two level;Stairs to enter without rails  (6 MARLON)   Bathroom Shower/Tub Tub/shower unit   Bathroom Toilet Standard   Bathroom Equipment   (no DME at baseline)   [address] Cane;Walker   Prior Function   Level of [place] Independent with ADLs and functional mobility   Lives With Son  (Son has autism, Pt primary caregiver)   Receives Help From Family  (Other son lives outside home, can help if needed)   ADL Assistance Independent   IADLs Independent   Falls in the last 6 months 1 to 4  (Cathi Francois on L LE recently, hematoma to L knee)   Vocational Retired  (Formerly head )   Comments Pt states she uses a cane for ambulation in community, no DME in home   Lifestyle   Autonomy Pt independent in ADLs/IADLs, performs IADLs for son  No AD for functional mobility in home (cane for community distances)  Pt reports that she drives  Reciprocal Relationships Two sons, one lives in home   Service to Others Retired    [address] Enjoys reading, attends [employer], friends with neighbors   Psychosocial   Psychosocial (WDL) WDL   Patient Behaviors/Mood Flat affect   Subjective   Subjective "I used to be close with my neighbors but we don't spend much time together these days"   ADL   Eating Assistance 5  Supervision/Setup   Grooming Assistance 5  Supervision/Setup   UB Bathing Assistance 5  Supervision/Setup     LB Bathing Assistance 4  Minimal Assistance     [address] 5  Supervision/Setup  (setup)   LB Dressing Assistance 4  Minimal Assistance  (setup)   [address]  3  Moderate Assistance  (Pt initiated task, but required assistance to complete task)   Functional Assistance 4  Minimal Assistance  )   Additional Comments Pt limited in self-care tasks due to fair activity tolerance   Bed Mobility   Additional Comments pt OOB in chair when arrived, left in chair with all needs   Transfers   Sit to Stand 4  Minimal assistance   Additional items Assist x 1; Increased time required   Stand to Sit 5  Supervision   Additional items Assist x 1; Increased time required   Additional Comments with RW   Functional Mobility   Functional Mobility 5  Supervision   Additional Comments S with RW short distance in hallway functional mobility, fatigued at end of task   Additional items Rolling walker   Balance   Static Sitting Good   Dynamic Sitting Fair +   Static Standing Fair   Dynamic Standing Fair -   Ambulatory Fair -   Activity Tolerance   Activity Tolerance Patient limited by fatigue   Medical Staff Made Aware PT [address] [facility]    Nurse Made Aware RN cleared pt for therapy   RUE Assessment   RUE Assessment WFL   Edema   RUE Edema Non-pitting   LUE Assessment   LUE Assessment WFL   Edema   LUE Edema Non-pitting Hand Function   Gross Motor Coordination Functional   Fine Motor Coordination Functional  (Finger to thumb sequencing WFL, increased time)   Vision-Basic Assessment   Current Vision Wears glasses only for reading  (Per pt report wears distance glass "sometimes")   Cognition   Overall Cognitive Status [facility]   Arousal/Participation Alert; Responsive   Attention Within functional limits   Orientation Level Oriented X4   Memory Within functional limits   Following Commands Follows all commands and directions without difficulty   Comments Pt with flat affect,was mildly irritable in response to evaluative questions, but cooperated in therapy  Pt exhibited good insight and safety awareness  Assessment   Limitation Decreased endurance;Decreased high-level ADLs; Decreased ADL status   Prognosis Fair   Assessment Pt is a 77 y o  female who was admitted to [facility] on 5/8/2021 with Gross hematuria , CKD stage 5, hydronephrosis of left kidney, chronic saddle pulmonary embolism, anemia, polycythemia vera   Pt's problem list also includes PMH of  has a past medical history of Anxiety, Bowel obstruction ([address] ), Chronic kidney disease (CKD), stage IV (severe) ([address] ), Chronic thrombosis of subclavian vein ([address] ), Circulation problem, Compression fracture of cervical spine ([address] ), Hernia of abdominal cavity, History of kidney problems, Hydronephrosis, Hypertension, IBS (irritable bowel syndrome), Incontinence, Lung mass, Polycythemia vera ([address] ), Pulmonary embolism ([address] ) (2014), Shingles, and Urinary tract infection    At baseline pt was completing I with ADL's/IADL's, no AD with functional ambulation, +drives  Pt lives with autistic son in a [facility] with MARLON  Currently pt requires min a for overall ADLS (mod a for toileting) and *S with RW for functional mobility/transfers   Pt currently presents with impairments in the following categories -steps to enter environment, limited home support, difficulty performing ADLS and difficulty performing IADLS  activity tolerance and endurance  These impairments, as well as pt's fatigue, decreased caregiver support and risk for falls  limit pt's ability to safely engage in all baseline areas of occupation, includingbathing, dressing, toileting, functional mobility/transfers, community mobility, laundry , driving, house maintenance, medication management, meal prep, cleaning, social participation  and leisure activities  The patient's raw score on the AM-PAC Daily Activity inpatient short form is 19, standardized score is 40 22, greater than 39 4  Patients at this level are likely to benefit from discharge to home  Please refer to the recommendation of the Occupational Therapist for safe discharge planning  From OT standpoint, recommend home OT and continue to assess DME (Pt reports no needs at this time) upon D/C  OT will continue to follow to address the below stated goals  Goals   Patient Goals Return home   LTG Time Frame 10-14   Long Term Goal #1 See below   Plan   Treatment Interventions ADL retraining;Functional transfer training; Endurance training;Patient/family training;Equipment evaluation/education; Compensatory technique education; Energy conservation; Activityengagement   Goal Expiration Date 05/26/21   OT Treatment Day 1   OT Frequency 3-5x/wk   Additional Treatment Session   Start Time 1300   End Time 1310   Treatment Assessment Pt seen for an additional OT treatment session with focus on self care tasks -including toileting tasks (mod a for bowel hygiene), toilet transfers (mod a from low toilet-declined need for commode placed over room standard toilet), standing at sink side for grooming tasks ( washing/drying hands post set-up) standing tolerance 2-3 minutes with S , fatigues easily requiring seated rest break  Pt educated on DME including commode, however pt declines at this time, continue education   Pt making progress, continue skilled OT to increase pt;s I with self care tasks, endurance/actdivity tolerance -pt lives alone with limited social support/primary caregiver for autsic son  Continue plan of care  Recommendation   OT Discharge Recommendation Home with home health rehabilitation   Equipment Recommended   (continue to assess)   OT - OK to Discharge Yes   AM-PAC Daily Activity Inpatient   Lower Body Dressing 3   Bathing 3   Toileting 3   Upper Body Dressing 4   Grooming 3   Eating 3   Daily Activity Raw Score 19   Daily Activity Standardized Score (Calc for Raw Score >=11) 40 22   AM-PAC Applied Cognition Inpatient   Following a Speech/Presentation 4   Understanding Ordinary Conversation 4   Taking Medications 4   Remembering Where Things Are Placed or Put Away 4   Remembering List of 4-5 Errands 4   Taking Care of Complicated Tasks 3   Applied Cognition Raw Score 23   Applied Cognition Standardized Score 53 08      Occupational Therapy Goals:    *Mod I with bed mobility to engage in functional tasks  *Mod I Adl's after setup with use of AE PRN  *Mod I toileting and clothing management   *Mod I functional mobility and transfers to/from all surfaces with Fair + dynamic balance and safety for participation in dynamic adls and iadl tasks   *Demonstrate good carryover with safe use of RW during functional tasks   *Assess DME needs   *Increase activity tolerance to 25-30 minutes for participation in adls and enjoyable activities  *Pt to participate in further cognitive testing with good attention and participation to assist with safe d/c recommendations  *Mod I with Simulated IADL management task  *Demonstrate good carryover of pt/family education and training with good tolerance for increased safety and independence with ADL's/ADl's      Elham Sky MOT, OTR/L

## 2021-05-13 LAB
ABO GROUP BLD BPU: NORMAL
ANION GAP SERPL CALCULATED.3IONS-SCNC: 9 MMOL/L (ref 4–13)
BPU ID: NORMAL
BUN SERPL-MCNC: 55 MG/DL (ref 5–25)
CALCIUM SERPL-MCNC: 8.7 MG/DL (ref 8.3–10.1)
CHLORIDE SERPL-SCNC: 112 MMOL/L (ref 100–108)
CO2 SERPL-SCNC: 18 MMOL/L (ref 21–32)
CREAT SERPL-MCNC: 3.7 MG/DL (ref 0.6–1.3)
CROSSMATCH: NORMAL
ERYTHROCYTE [DISTWIDTH] IN BLOOD BY AUTOMATED COUNT: 14.6 % (ref 11.6–15.1)
GFR SERPL CREATININE-BSD FRML MDRD: 11 ML/MIN/1.73SQ M
GLUCOSE SERPL-MCNC: 104 MG/DL (ref 65–140)
HCT VFR BLD AUTO: 26.5 % (ref 34.8–46.1)
HGB BLD-MCNC: 8.3 G/DL (ref 11.5–15.4)
MCH RBC QN AUTO: 28.6 PG (ref 26.8–34.3)
MCHC RBC AUTO-ENTMCNC: 31.3 G/DL (ref 31.4–37.4)
MCV RBC AUTO: 91 FL (ref 82–98)
NRBC BLD AUTO-RTO: 0 /100 WBCS
PLATELET # BLD AUTO: 317 THOUSANDS/UL (ref 149–390)
PMV BLD AUTO: 9.5 FL (ref 8.9–12.7)
POTASSIUM SERPL-SCNC: 4.3 MMOL/L (ref 3.5–5.3)
RBC # BLD AUTO: 2.9 MILLION/UL (ref 3.81–5.12)
SODIUM SERPL-SCNC: 139 MMOL/L (ref 136–145)
UNIT DISPENSE STATUS: NORMAL
UNIT PRODUCT CODE: NORMAL
UNIT RH: NORMAL
WBC # BLD AUTO: 5.61 THOUSAND/UL (ref 4.31–10.16)

## 2021-05-13 PROCEDURE — 99232 SBSQ HOSP IP/OBS MODERATE 35: CPT | Performed by: INTERNAL MEDICINE

## 2021-05-13 PROCEDURE — 99232 SBSQ HOSP IP/OBS MODERATE 35: CPT | Performed by: UROLOGY

## 2021-05-13 PROCEDURE — 85027 COMPLETE CBC AUTOMATED: CPT | Performed by: INTERNAL MEDICINE

## 2021-05-13 PROCEDURE — 80048 BASIC METABOLIC PNL TOTAL CA: CPT | Performed by: INTERNAL MEDICINE

## 2021-05-13 RX ORDER — SODIUM BICARBONATE 650 MG/1
1300 TABLET ORAL
Status: DISCONTINUED | OUTPATIENT
Start: 2021-05-13 | End: 2021-05-16 | Stop reason: HOSPADM

## 2021-05-13 RX ADMIN — RUXOLITINIB 10 MG: 10 TABLET ORAL at 08:32

## 2021-05-13 RX ADMIN — Medication 250 MG: at 08:32

## 2021-05-13 RX ADMIN — ATORVASTATIN CALCIUM 40 MG: 40 TABLET, FILM COATED ORAL at 16:57

## 2021-05-13 RX ADMIN — CITALOPRAM HYDROBROMIDE 20 MG: 20 TABLET ORAL at 08:32

## 2021-05-13 RX ADMIN — SODIUM BICARBONATE 650 MG TABLET 650 MG: at 08:32

## 2021-05-13 RX ADMIN — DOCUSATE SODIUM 100 MG: 100 CAPSULE ORAL at 18:24

## 2021-05-13 RX ADMIN — Medication 150 MG: at 08:33

## 2021-05-13 RX ADMIN — Medication 1000 UNITS: at 08:32

## 2021-05-13 RX ADMIN — LEVOTHYROXINE SODIUM 75 MCG: 75 TABLET ORAL at 05:29

## 2021-05-13 RX ADMIN — MELATONIN TAB 3 MG 3 MG: 3 TAB at 21:23

## 2021-05-13 RX ADMIN — METOPROLOL TARTRATE 25 MG: 25 TABLET, FILM COATED ORAL at 08:32

## 2021-05-13 RX ADMIN — METOPROLOL TARTRATE 25 MG: 25 TABLET, FILM COATED ORAL at 18:24

## 2021-05-13 RX ADMIN — OXYCODONE HYDROCHLORIDE 2.5 MG: 5 TABLET ORAL at 13:42

## 2021-05-13 RX ADMIN — CEFTRIAXONE 1000 MG: 2 INJECTION, POWDER, FOR SOLUTION INTRAMUSCULAR; INTRAVENOUS at 08:32

## 2021-05-13 RX ADMIN — SODIUM BICARBONATE 650 MG TABLET 1300 MG: at 18:24

## 2021-05-13 NOTE — ASSESSMENT & PLAN NOTE
Anemia noted--likely 2/2 hematuria coupled with anemia of CKD at baseline (baseline around 9-10)  · Trend CBC, s/p unit of PRBC on 5/12 with improvement of Hgb from 7 3-->8 3  · No JENELLE due to PE hx  · Will avoid IV iron as well given ? infection  · Oral iron started

## 2021-05-13 NOTE — PROGRESS NOTES
Progress Note - Urology  Kodak Philip 77 y o  female MRN: [id number]  Unit/Bed#: [facility] 803-01 Encounter: [id number]    Assessment & Plan:    Gross hematuria:  -present on admission, currently resolved with holding of anticoagulation of Eliquis and aspirin as well as hydration  -patient may continue with hydration at this time   -Discussed with internal medicine anticoagulation may be started after 24 hours of clear yellow urine   -no  surgical intervention needed at this time,UA positive for 4 RBCs, with blood cells and bacteria   -continue with medical optimization at this time, with IV fluids and IV antibiotics     LUANN:  -creatinine elevated at 3 68 on admission currently 3 70 and has been stable between 3 6 and 3 7   -nephrology following  -advanced left renal atrophy noted on CT scan  Left-sided hydronephrosis:  -postop day 3 for left nephrostomy tube placement  Maintain percutaneous drainage for several weeks  Patient may be re-evaluated with anterograde study for possible potential internalization  Otherwise patient will require chronic anterograde percutaneous drainage  Subjective/Objective   Chief Complaint:     Subjective:   Patient is a 71-year-old female with history of end-stage bladder  s/p super trigonal cystectomy for palliation  This was performed in conjunction with ileal conduit urinary diversion by Dr Torrence Primrose in 2016  Indication for this procedure included renal failure due to elevated bladder pressures  Patient has a history of peristomal hernia with obstruction with surgical repair in 2018  Patient reported to the hospital due to gross hematuria  Patient denies any pain  She is currently anticoagulated with aspirin and Eliquis  No nausea, no vomiting no fevers or chills  CT scan revealing worsening left hydronephrosis as well as perinephric stranding  This is due to potentially anastomotic stricture at surgical site    Baseline creatinine 2 5-3 currently 3 68 on admission  Status post IR nephrostomy tube placement  Patient currently reporting that she is feeling well  She is hoping that she will be discharged shortly  She reports her hematuria has improved greatly  Current ileostomy urine is clear yellow and percutaneous nephrostomy tube is light pink in color  Objective:     Blood pressure 153/85, pulse 77, temperature 98 °F (36 7 °C), resp  rate 16, height 5' (1 524 m), weight 89 5 kg (197 lb 4 8 oz), SpO2 95 %, not currently breastfeeding  ,Body mass index is 38 53 kg/m²  Intake/Output Summary (Last 24 hours) at 5/13/2021 1259  Last data filed at 5/13/2021 1100  Gross per 24 hour   Intake 1150 ml   Output 1060 ml   Net 90 ml       Invasive Devices     Peripheral Intravenous Line            Peripheral IV 05/12/21 Dorsal (posterior); Left Wrist 1 day    Peripheral IV 05/12/21 Left Antecubital 1 day          Line            Hemodialysis AV Fistula Right Upper arm -- days          Drain            Urostomy Ileal conduit RUQ 1681 days    Nephrostomy Left 1 8 Fr  2 days                Physical Exam  Constitutional:       General: She is not in acute distress  Appearance: Normal appearance  She is not ill-appearing, toxic-appearing or diaphoretic  HENT:      Head: Normocephalic and atraumatic  Right Ear: External ear normal       Left Ear: External ear normal       Nose: Nose normal    Eyes:      General: No scleral icterus  Conjunctiva/sclera: Conjunctivae normal    Neck:      Musculoskeletal: Normal range of motion  Cardiovascular:      Rate and Rhythm: Normal rate and regular rhythm  Pulses: Normal pulses  Heart sounds: Normal heart sounds  No murmur  No friction rub  No gallop  Pulmonary:      Effort: Pulmonary effort is normal  No respiratory distress  Breath sounds: No wheezing, rhonchi or rales  Abdominal:      General: Bowel sounds are normal  There is no distension  Tenderness: There is no abdominal tenderness  There is no right CVA tenderness or left CVA tenderness  Comments: Ileal conduit putting out clear yellow urine  Nephrostomy tube putting out light pink urine  Musculoskeletal: Normal range of motion  Skin:     General: Skin is warm and dry  Neurological:      General: No focal deficit present  Mental Status: She is alert and oriented to person, place, and time  Psychiatric:         Mood and Affect: Mood normal          Behavior: Behavior normal          Thought Content: Thought content normal          Judgment: Judgment normal            Lab, Imaging and other studies:I have personally reviewed pertinent lab results      Lab Results   Component Value Date    WBC 5 61 05/13/2021    HGB 8 3 (L) 05/13/2021    HCT 26 5 (L) 05/13/2021    MCV 91 05/13/2021     05/13/2021     Lab Results   Component Value Date    SODIUM 139 05/13/2021    K 4 3 05/13/2021     (H) 05/13/2021    CO2 18 (L) 05/13/2021    BUN 55 (H) 05/13/2021    CREATININE 3 70 (H) 05/13/2021    GLUC 104 05/13/2021    CALCIUM 8 7 05/13/2021       VTE Pharmacologic Prophylaxis: Reason for no pharmacologic prophylaxis Gross hematuria  VTE Mechanical Prophylaxis: sequential compression device    Juwan Gamez PA-C

## 2021-05-13 NOTE — ASSESSMENT & PLAN NOTE
Patient presented to ED for gross hematuria (likely due to eliquis)  Has PMHx of CKD Stage 5, obstructive uropathy s/p cystectomy with ilial conduit  · CT abdomin pelvis: New left hydronephrosis with perinephric stranding   · Suggests the possibility of more acute obstruction and/or pyelonephritis     · Urology consulted, input appreciated   · IR following, s/p L nephrostomy tube placement on 5/11 with Dr Danie Grier (IR)  · Monitor H/H, transfuse PRN--s/p 1 unit PRBC 5/12  · Urine cx pre-operatively with mixed contaminants x3  · During IR procedure was noted to have purulent urine, started on abx (cultures mixed post op)  · Will complete abx x5 days  · Holding eliquis and asa per urology--ok to resume on 5/14 and discharge per my d/w them today  · PT/OT input appreciated, recommending home with home therapy, CM to arrange

## 2021-05-13 NOTE — ASSESSMENT & PLAN NOTE
Lab Results   Component Value Date    EGFR 11 05/13/2021    EGFR 12 05/12/2021    EGFR 12 05/11/2021    CREATININE 3 70 (H) 05/13/2021    CREATININE 3 66 (H) 05/12/2021    CREATININE 3 62 (H) 05/11/2021   Estimated Creatinine Clearance: 13 3 mL/min (A) (by C-G formula based on SCr of 3 7 mg/dL (H))    Cr near baseline 3 4-3 5, follows with Dr Eliane Snider  · No significant improvement s/p nephrostomy tube  · Renal input appreciated   · Monitor BMP

## 2021-05-13 NOTE — PROGRESS NOTES
Progress Note - Nephrology   Perlita Healy 77 y o  female MRN: [id number]  Unit/Bed#: [facility] 803-01 Encounter: [id number]    ASSESSMENT AND PLAN:  78-year-old female with history cystectomy ileal conduit and chronic hydronephrosis with advanced chronic kidney disease stage 5 followed by Dr Will Salazar:  She now presents with gross hematuria, CT scan demonstrated left hydronephrosis and perinephric stranding and more acute obstruction possible pyelonephritis   Status post antibiotics and left PCN placed 5/10     1  CKD stage 5:  -baseline creatinine 3 5-3 6 followed by Dr Will Salazar  -etiology felt secondary to obstructive uropathy with functional solitary right kidney frequent episodes of acute kidney injury    -UA with innumerable RBCs WBCs and bacteria  CURRENT CREATININE:  3 70 which is close to baseline  Recommend:  -monitor renal function   -follow-up regarding the AV fistula  -avoid nephrotoxic agents such as NSAIDs  -avoid hypotension     2  Blood pressure is slightly elevated 50 range however just monitor for now to avoid hypotension/hypoperfusion  3  Electrolytes:  Metabolic acidosis to be treated with sodium bicarbonate, I will increase this to 1300 mg twice a day  4  MBD:   magnesium and phosphorus both acceptable  On calcitriol 3 times a week  5  Anemia:  Hemoglobin 8 3 most likely secondary to chronic disease and chronic kidney disease:  NO JENELLE DUE TO HISTORY OF PE, transfuse as needed for hemoglobin less than 7 0  · Low iron studies:  Now on oral iron  6  Status post left PCN for obstructive uropathy now being re-initiated on anticoagulants       Subjective: The patient is asymptomatic  No chest pain or shortness of breath  No nausea vomiting or diarrhea  Ileal conduit as well as left PCN with good urine output which is clear  Objective:     Vitals: Blood pressure 153/85, pulse 77, temperature 98 °F (36 7 °C), resp   rate 16, height 5' (1 524 m), weight 89 5 kg (197 lb 4 8 oz), SpO2 95 %, not currently breastfeeding  ,Body mass index is 38 53 kg/m²      Weight (last 2 days)     None            Intake/Output Summary (Last 24 hours) at 5/13/2021 1130  Last data filed at 5/13/2021 0800  Gross per 24 hour   Intake 970 ml   Output 1060 ml   Net -90 ml            Physical Exam: General:  Obese, No acute distress  Skin:  No acute rash  Eyes:  No scleral icterus and noninjected  ENT:  Moist mucous membranes  Neck:  Supple, no jugular venous distention, trachea midline, overall appearance is normal  Chest:  Clear to auscultation  CVS:  Regular rate and rhythm, without a rub or gallops  Abdomen:  Obese, Normal bowel sounds, soft and nontender and nondistended; ileal conduit with good output which is clear yellow  Back:  Left PCN bandage clean and dry  Extremities:  No edema, and no cyanosis, no significant arthritic changes  Neuro:  No gross focality  Psych:  Alert and oriented and appropriate                Medications:    Scheduled Meds:  Current Facility-Administered Medications   Medication Dose Route Frequency Provider Last Rate    acetaminophen  650 mg Oral Q6H PRN Artie Johnson, DO      atorvastatin  40 mg Oral Daily With [facility], DO      calcitriol  0 25 mcg Oral Every Other Day Artie Johnson, DO      cefTRIAXone  1,000 mg Intravenous Q24H Jer Clemens PA-C 1,000 mg (05/13/21 9305)    cholecalciferol  1,000 Units Oral Daily Artie Johnson, DO      citalopram  20 mg Oral Daily Artie Johnson, DO      docusate sodium  100 mg Oral BID Jer Clemens PA-C      fentaNYL  25 mcg Intravenous Q5 Min PRN Odessia Spotted, CRNA      iron polysaccharides  150 mg Oral Daily Roger Perry MD      levofloxacin  500 mg Intravenous Once Lela Worley MD      levothyroxine  75 mcg Oral Early Morning Artie Johnson, DO      melatonin  3 mg Oral HS Artie Johnson, DO      metoprolol tartrate  25 mg Oral BID Artie Johnson, DO      ondansetron  4 mg Intravenous Once PRN Odessia SpottedMUKESH      oxyCODONE 2 5 mg Oral Q4H PRN oRberta Abad PA-C      polyethylene glycol  17 g Oral BID Cj Gracia PA-C      ruxolitinib  10 mg Oral Daily Laura Walter MD      saccharomyces boulardii  250 mg Oral Daily Esha Ramos DO      simethicone  80 mg Oral Q6H PRN Cj Gracia PA-C      sodium bicarbonate  650 mg Oral BID after meals Joslyn Kauffman PA-C         PRN Meds:   acetaminophen    fentaNYL    ondansetron    oxyCODONE    simethicone    Continuous Infusions:     Lab, Imaging and other studies: I have personally reviewed pertinent labs    Laboratory Results:  Results from last 7 days   Lab Units 05/13/21  0627 05/13/21  0456 05/12/21  0443 05/11/21  0441 05/10/21  0511 05/09/21  0528 05/08/21  1831 05/08/21  1219   WBC Thousand/uL 5 61  --  6 06 8 71 5 17 4 70  --  5 35   HEMOGLOBIN g/dL 8 3*  --  7 3* 7 5* 8 0* 8 0* 7 9* 8 7*   HEMATOCRIT % 26 5*  --  23 4* 24 7* 26 1* 25 8* 25 7* 27 8*   PLATELETS Thousands/uL 317  --  331 353 347 344  --  395*   POTASSIUM mmol/L  --  4 3 4 5 4 7 4 5 4 4  --  5 0   CHLORIDE mmol/L  --  112* 109* 112* 112* 110*  --  109*   CO2 mmol/L  --  18* 20* 19* 20* 21  --  19*   BUN mg/dL  --  55* 44* 38* 44* 43*  --  42*   CREATININE mg/dL  --  3 70* 3 66* 3 62* 3 52* 3 57*  --  3 68*   CALCIUM mg/dL  --  8 7 8 6 8 3 8 2* 8 3  --  8 9   MAGNESIUM mg/dL  --   --  1 9  --   --  2 0  --   --    PHOSPHORUS mg/dL  --   --  3 5  --   --  3 4  --   --      Urinalysis:   Lab Results   Component Value Date    COLORU Brown 05/08/2021    COLORU Yellow 09/09/2015    CLARITYU Turbid 05/08/2021    CLARITYU Cloudy 09/09/2015    SPECGRAV 1 013 05/08/2021    SPECGRAV <=1 005 09/09/2015    PHUR Interference-unable to analyze (A) 05/08/2021    PHUR 6 5 07/14/2020    PHUR 6 0 09/09/2015    LEUKOCYTESUR Interference- unable to analyze (A) 05/08/2021    LEUKOCYTESUR Large (A) 09/09/2015    NITRITE Interference- unable to analyze 05/08/2021    NITRITE Positive (A) 09/09/2015    PROTEINUA 30 (1+) (A) 09/09/2015    GLUCOSEU Interference- unable to analyze 05/08/2021    GLUCOSEU Negative 09/09/2015    KETONESU Interference- unable to analyze (A) 05/08/2021    KETONESU Negative 09/09/2015    BILIRUBINUR Interference- unable to analyze (A) 05/08/2021    BILIRUBINUR Negative 09/09/2015    BLOODU Interference- unable to analyze (A) 05/08/2021    BLOODU Moderate (A) 09/09/2015     ABGs:   Lab Results   Component Value Date    [facility] 7 444 10/04/2016     Radiology review:     Portions of the record may have been created with voice recognition software  Occasional wrong word or "sound a like" substitutions may have occurred due to the inherent limitations of voice recognition software  Read the chart carefully and recognize, using context, where substitutions have occurred

## 2021-05-13 NOTE — ASSESSMENT & PLAN NOTE
Many years ago per patient  · Eliquis held due to hematuria and need for nephrostomy tube placement  · Urology reports ok to resume asa/eliquis on Friday 5/14 and discharge as long as urine continues to remain clear   · IR ok with resuming

## 2021-05-13 NOTE — PLAN OF CARE
Problem: Potential for Falls  Goal: Patient will remain free of falls  Description: INTERVENTIONS:  - Assess patient frequently for physical needs  -  Identify cognitive and physical deficits and behaviors that affect risk of falls    -  [place] fall precautions as indicated by assessment   - Educate patient/family on patient safety including physical limitations  - Instruct patient to call for assistance with activity based on assessment  - Modify environment to reduce risk of injury  - Consider OT/PT consult to assist with strengthening/mobility  Outcome: Progressing     Problem: PAIN - ADULT  Goal: Verbalizes/displays adequate comfort level or baseline comfort level  Description: Interventions:  - Encourage patient to monitor pain and request assistance  - Assess pain using appropriate pain scale  - Administer analgesics based on type and severity of pain and evaluate response  - Implement non-pharmacological measures as appropriate and evaluate response  - Consider cultural and social influences on pain and pain management  - Notify physician/advanced practitioner if interventions unsuccessful or patient reports new pain  Outcome: Progressing     Problem: INFECTION - ADULT  Goal: Absence or prevention of progression during hospitalization  Description: INTERVENTIONS:  - Assess and monitor for signs and symptoms of infection  - Monitor lab/diagnostic results  - Monitor all insertion sites, i e  indwelling lines, tubes, and drains  - Monitor endotracheal if appropriate and nasal secretions for changes in amount and color  - [place] appropriate cooling/warming therapies per order  - Administer medications as ordered  - Instruct and encourage patient and family to use good hand hygiene technique  - Identify and instruct in appropriate isolation precautions for identified infection/condition  Outcome: Progressing  Goal: Absence of fever/infection during neutropenic period  Description: INTERVENTIONS:  - Monitor WBC    Outcome: Progressing     Problem: SAFETY ADULT  Goal: Patient will remain free of falls  Description: INTERVENTIONS:  - Assess patient frequently for physical needs  -  Identify cognitive and physical deficits and behaviors that affect risk of falls    -  [place] fall precautions as indicated by assessment   - Educate patient/family on patient safety including physical limitations  - Instruct patient to call for assistance with activity based on assessment  - Modify environment to reduce risk of injury  - Consider OT/PT consult to assist with strengthening/mobility  Outcome: Progressing  Goal: Maintain or return to baseline ADL function  Description: INTERVENTIONS:  -  Assess patient's ability to carry out ADLs; assess patient's baseline for ADL function and identify physical deficits which impact ability to perform ADLs (bathing, care of mouth/teeth, toileting, grooming, dressing, etc )  - Assess/evaluate cause of self-care deficits   - Assess range of motion  - Assess patient's mobility; develop plan if impaired  - Assess patient's need for assistive devices and provide as appropriate  - Encourage maximum independence but intervene and supervise when necessary  - Involve family in performance of ADLs  - Assess for home care needs following discharge   - Consider OT consult to assist with ADL evaluation and planning for discharge  - Provide patient education as appropriate  Outcome: Progressing  Goal: Maintain or return mobility status to optimal level  Description: INTERVENTIONS:  - Assess patient's baseline mobility status (ambulation, transfers, stairs, etc )    - Identify cognitive and physical deficits and behaviors that affect mobility  - Identify mobility aids required to assist with transfers and/or ambulation (gait belt, sit-to-stand, lift, walker, cane, etc )  - [place] fall precautions as indicated by assessment  - Record patient progress and toleration of activity level on Mobility SBAR; progress patient to next Phase/Stage  - Instruct patient to call for assistance with activity based on assessment  - Consider rehabilitation consult to assist with strengthening/weightbearing, etc   Outcome: Progressing     Problem: DISCHARGE PLANNING  Goal: Discharge to home or other facility with appropriate resources  Description: INTERVENTIONS:  - Identify barriers to discharge w/patient and caregiver  - Arrange for needed discharge resources and transportation as appropriate  - Identify discharge learning needs (meds, wound care, etc )  - Arrange for interpretive services to assist at discharge as needed  - Refer to Case Management Department for coordinating discharge planning if the patient needs post-hospital services based on physician/advanced practitioner order or complex needs related to functional status, cognitive ability, or social support system  Outcome: Progressing     Problem: Knowledge Deficit  Goal: Patient/family/caregiver demonstrates understanding of disease process, treatment plan, medications, and discharge instructions  Description: Complete learning assessment and assess knowledge base    Interventions:  - Provide teaching at level of understanding  - Provide teaching via preferred learning methods  Outcome: Progressing

## 2021-05-13 NOTE — CASE MANAGEMENT
IMM reviewed with patient  patient agree with discharge determination  CM will file on the Pt's behalf in Medical Records

## 2021-05-13 NOTE — PROGRESS NOTES
[facility]  Progress Note - Darnell Hare [birth year], 77 y o  female MRN: [id number]  Unit/Bed#: [facility] 803-01 Encounter: [id number]  Primary Care Provider: Malcom Washington MD   Date and time admitted to hospital: 5/8/2021 11:18 AM    * Gross hematuria  Assessment & Plan  Patient presented to ED for gross hematuria (likely due to eliquis)  Has PMHx of CKD Stage 5, obstructive uropathy s/p cystectomy with ilial conduit  · CT abdomin pelvis: New left hydronephrosis with perinephric stranding   · Suggests the possibility of more acute obstruction and/or pyelonephritis  · Urology consulted, input appreciated   · IR following, s/p L nephrostomy tube placement on 5/11 with Dr Juliann Martino (IR)  · Monitor H/H, transfuse PRN--s/p 1 unit PRBC 5/12  · Urine cx pre-operatively with mixed contaminants x3  · During IR procedure was noted to have purulent urine, started on abx (cultures mixed post op)  · Will complete abx x5 days  · Holding eliquis and asa per urology--ok to resume on 5/14 and discharge per my d/w them today  · PT/OT input appreciated, recommending home with home therapy, CM to arrange     Anemia  Assessment & Plan  Anemia noted--likely 2/2 hematuria coupled with anemia of CKD at baseline (baseline around 9-10)  · Trend CBC, s/p unit of PRBC on 5/12 with improvement of Hgb from 7 3-->8 3  · No JENELLE due to PE hx  · Will avoid IV iron as well given ? infection  · Oral iron started     Hydronephrosis of left kidney  Assessment & Plan  IR and urology consulted  · S/P Perc neph tube on 5/11 with IR   · Flush with 10 mL NSS BID  · F/u IR q3 months for routine tube exchanges  · Will need ongoing nephrostomy tube drainage for at least several weeks and then re-eval with antegrade study    Chronic saddle pulmonary embolism ([address] )  Assessment & Plan  Many years ago per patient  · Eliquis held due to hematuria and need for nephrostomy tube placement  · Urology reports ok to resume asa/eliquis on Friday 5/14 and discharge as long as urine continues to remain clear   · IR ok with resuming     Constipation  Assessment & Plan  Finally had BM on 5/12  · Continue bowel regimen    Hyperlipemia  Assessment & Plan  · Continue statin    Stage 5 chronic kidney disease not on chronic dialysis [facility])  Assessment & Plan  Lab Results   Component Value Date    EGFR 11 05/13/2021    EGFR 12 05/12/2021    EGFR 12 05/11/2021    CREATININE 3 70 (H) 05/13/2021    CREATININE 3 66 (H) 05/12/2021    CREATININE 3 62 (H) 05/11/2021   Estimated Creatinine Clearance: 13 3 mL/min (A) (by C-G formula based on SCr of 3 7 mg/dL (H))  Cr near baseline 3 4-3 5, follows with Dr Yoana Mark  · No significant improvement s/p nephrostomy tube  · Renal input appreciated   · Monitor BMP     Polycythemia vera ([address] )  Assessment & Plan  Patient follows up with outpatient Hematology Oncology, currently on Jakafi 10 mg Oral Daily  · Brought medication in from home   · Monitor labs     Obstructive uropathy  Assessment & Plan  S/p ileostomy for nonfunctioning bladder and chronic hydro  · Urology following           VTE Pharmacologic Prophylaxis: VTE Score: 6 Moderate Risk (Score 3-4) - Pharmacological DVT Prophylaxis Contraindicated  Sequential Compression Devices Ordered  Patient Centered Rounds: I performed bedside rounds with nursing staff today  Discussions with Specialists or Other Care Team Provider: D/w Urology in detail, d/w CM  Education and Discussions with Family / Patient: Updated  (son) via phone  Time Spent for Care: 30 minutes  More than 50% of total time spent on counseling and coordination of care as described above      Current Length of Stay: 5 day(s)  Current Patient Status: Inpatient   Certification Statement: The patient will continue to require additional inpatient hospital stay due to monitoring labs additional day   Discharge Plan: Anticipate discharge tomorrow to home with home services  Code Status: Level 1 - Full Code    Subjective:   Doing much better today  Finally had BM  Urine clearing  Energy improving  Objective:     Vitals:   Temp (24hrs), Av 1 °F (36 7 °C), Min:97 9 °F (36 6 °C), Max:98 5 °F (36 9 °C)    Temp:  [97 9 °F (36 6 °C)-98 5 °F (36 9 °C)] 98 °F (36 7 °C)  HR:  [74-90] 77  Resp:  [16-18] 16  BP: (129-154)/(77-85) 153/85  SpO2:  [95 %-97 %] 95 %  Body mass index is 38 53 kg/m²  Input and Output Summary (last 24 hours): Intake/Output Summary (Last 24 hours) at 2021 0953  Last data filed at 2021 0800  Gross per 24 hour   Intake 1190 ml   Output 1060 ml   Net 130 ml       Physical Exam:   Physical Exam  Vitals signs and nursing note reviewed  Cardiovascular:      Rate and Rhythm: Normal rate and regular rhythm  Pulmonary:      Effort: No respiratory distress  Abdominal:      General: There is no distension  Tenderness: There is no abdominal tenderness  Genitourinary:     Comments: L perc nephro tube with some rust tinged urine, ileal conduit clear yellow urine   Musculoskeletal:      Right lower leg: No edema  Left lower leg: No edema  Skin:     Coloration: Skin is pale  Comments: Multiple bruises bilateral upper extremities    Neurological:      Mental Status: She is oriented to person, place, and time     Psychiatric:         Mood and Affect: Mood normal           Additional Data:     Labs:  Results from last 7 days   Lab Units 21  0627 21  0443 21  0441   WBC Thousand/uL 5 61 6 06 8 71   HEMOGLOBIN g/dL 8 3* 7 3* 7 5*   HEMATOCRIT % 26 5* 23 4* 24 7*   PLATELETS Thousands/uL 317 331 353   BANDS PCT %  --  4  --    NEUTROS PCT %  --   --  86*   LYMPHS PCT %  --   --  6*   LYMPHO PCT %  --  7*  --    MONOS PCT %  --   --  6   MONO PCT %  --  4  --    EOS PCT %  --  1 0     Results from last 7 days   Lab Units 21  0456  21  1318   SODIUM mmol/L 139   < >  --    POTASSIUM mmol/L 4 3   < >  -- CHLORIDE mmol/L 112*   < >  --    CO2 mmol/L 18*   < >  --    BUN mg/dL 55*   < >  --    CREATININE mg/dL 3 70*   < >  --    ANION GAP mmol/L 9   < >  --    CALCIUM mg/dL 8 7   < >  --    ALBUMIN g/dL  --   --  2 9*   TOTAL BILIRUBIN mg/dL  --   --  0 56   ALK PHOS U/L  --   --  85   ALT U/L  --   --  12   AST U/L  --   --  16   GLUCOSE RANDOM mg/dL 104   < >  --     < > = values in this interval not displayed  Results from last 7 days   Lab Units 05/11/21  0441   INR  1 39*                   Lines/Drains:  Invasive Devices     Peripheral Intravenous Line            Peripheral IV 05/12/21 Dorsal (posterior); Left Wrist 1 day    Peripheral IV 05/12/21 Left Antecubital less than 1 day          Line            Hemodialysis AV Fistula Right Upper arm -- days          Drain            Urostomy Ileal conduit RUQ 1681 days    Nephrostomy Left 1 8 Fr  2 days                      Imaging: No pertinent imaging reviewed      Recent Cultures (last 7 days):   Results from last 7 days   Lab Units 05/10/21  1611 05/10/21  1542 05/10/21  1530 05/08/21  1338   GRAM STAIN RESULT   --  1+ Gram positive cocci in pairs*  1+ Gram negative rods*  No polys seen*  --   --    URINE CULTURE  >100,000 cfu/ml Morganella morganii*  >100,000 cfu/ml Enterococcus faecalis*  <10,000 cfu/ml Alpha Hemolytic Streptococcus*  <10,000 cfu/ml Gram Negative Marcel*  --  >100,000 cfu/ml  >100,000 cfu/ml    BODY FLUID CULTURE, STERILE   --  4+ Growth of Enterococcus faecalis*  4+ Growth of Morganella morganii*  --   --        Last 24 Hours Medication List:   Current Facility-Administered Medications   Medication Dose Route Frequency Provider Last Rate    acetaminophen  650 mg Oral Q6H PRN Delcia Part, DO      atorvastatin  40 mg Oral Daily With [facility], DO      bisacodyl  10 mg Rectal Daily Ricki Faith PA-C      calcitriol  0 25 mcg Oral Every Other Day Delcia Part, DO      cefTRIAXone  1,000 mg Intravenous Q24H Kristie Fonseca DIMITRI [place] 1,000 mg (05/13/21 0832)    cholecalciferol  1,000 Units Oral Daily Laura [place], DO      citalopram  20 mg Oral Daily Laura [place], DO      docusate sodium  100 mg Oral BID Sapna Alvarez PA-C      fentaNYL  25 mcg Intravenous Q5 Min PRN Russ Huguenin, CRNA      iron polysaccharides  150 mg Oral Daily Shey Gaines MD      levofloxacin  500 mg Intravenous Once Jose L Branch MD      levothyroxine  75 mcg Oral Early Morning Laura [place], DO      melatonin  3 mg Oral HS Laura [place], DO      metoprolol tartrate  25 mg Oral BID Laura [place], DO      ondansetron  4 mg Intravenous Once PRN Russ Dona, CRNA      oxyCODONE  2 5 mg Oral Q4H PRN Roberta Abad PA-C      polyethylene glycol  17 g Oral BID Sapna Alvarez PA-C      ruxolitinib  10 mg Oral Daily Massimo Torres MD      saccharomyces boulardii  250 mg Oral Daily Laura [place], DO      simethicone  80 mg Oral Q6H PRN Sapna Alvarez PA-C      sodium bicarbonate  650 mg Oral BID after meals Sofia Coffman PA-C          Today, Patient Was Seen By: Sapna Alvarez PA-C    **Please Note: This note may have been constructed using a voice recognition system  **

## 2021-05-14 ENCOUNTER — APPOINTMENT (INPATIENT)
Dept: RADIOLOGY | Facility: HOSPITAL | Age: 76
DRG: 690 | End: 2021-05-14
Attending: RADIOLOGY
Payer: COMMERCIAL

## 2021-05-14 PROBLEM — N39.0 URINARY TRACT INFECTION: Status: ACTIVE | Noted: 2021-05-14

## 2021-05-14 LAB
ANION GAP SERPL CALCULATED.3IONS-SCNC: 6 MMOL/L (ref 4–13)
BACTERIA SPEC BFLD CULT: ABNORMAL
BUN SERPL-MCNC: 56 MG/DL (ref 5–25)
CALCIUM SERPL-MCNC: 8.9 MG/DL (ref 8.3–10.1)
CHLORIDE SERPL-SCNC: 111 MMOL/L (ref 100–108)
CO2 SERPL-SCNC: 23 MMOL/L (ref 21–32)
CREAT SERPL-MCNC: 3.46 MG/DL (ref 0.6–1.3)
ERYTHROCYTE [DISTWIDTH] IN BLOOD BY AUTOMATED COUNT: 14.5 % (ref 11.6–15.1)
GFR SERPL CREATININE-BSD FRML MDRD: 12 ML/MIN/1.73SQ M
GLUCOSE SERPL-MCNC: 94 MG/DL (ref 65–140)
GRAM STN SPEC: ABNORMAL
HCT VFR BLD AUTO: 27.2 % (ref 34.8–46.1)
HGB BLD-MCNC: 8.1 G/DL (ref 11.5–15.4)
MCH RBC QN AUTO: 28.6 PG (ref 26.8–34.3)
MCHC RBC AUTO-ENTMCNC: 29.8 G/DL (ref 31.4–37.4)
MCV RBC AUTO: 96 FL (ref 82–98)
NRBC BLD AUTO-RTO: 0 /100 WBCS
PLATELET # BLD AUTO: 366 THOUSANDS/UL (ref 149–390)
PMV BLD AUTO: 9.8 FL (ref 8.9–12.7)
POTASSIUM SERPL-SCNC: 4.6 MMOL/L (ref 3.5–5.3)
RBC # BLD AUTO: 2.83 MILLION/UL (ref 3.81–5.12)
SODIUM SERPL-SCNC: 140 MMOL/L (ref 136–145)
WBC # BLD AUTO: 5.66 THOUSAND/UL (ref 4.31–10.16)

## 2021-05-14 PROCEDURE — C1769 GUIDE WIRE: HCPCS

## 2021-05-14 PROCEDURE — 0T25X0Z CHANGE DRAINAGE DEVICE IN KIDNEY, EXTERNAL APPROACH: ICD-10-PCS | Performed by: RADIOLOGY

## 2021-05-14 PROCEDURE — 97116 GAIT TRAINING THERAPY: CPT

## 2021-05-14 PROCEDURE — 85027 COMPLETE CBC AUTOMATED: CPT | Performed by: INTERNAL MEDICINE

## 2021-05-14 PROCEDURE — 50435 EXCHANGE NEPHROSTOMY CATH: CPT | Performed by: RADIOLOGY

## 2021-05-14 PROCEDURE — 50435 EXCHANGE NEPHROSTOMY CATH: CPT

## 2021-05-14 PROCEDURE — 99232 SBSQ HOSP IP/OBS MODERATE 35: CPT | Performed by: INTERNAL MEDICINE

## 2021-05-14 PROCEDURE — 99024 POSTOP FOLLOW-UP VISIT: CPT | Performed by: RADIOLOGY

## 2021-05-14 PROCEDURE — 80048 BASIC METABOLIC PNL TOTAL CA: CPT | Performed by: INTERNAL MEDICINE

## 2021-05-14 PROCEDURE — 97110 THERAPEUTIC EXERCISES: CPT

## 2021-05-14 RX ADMIN — SODIUM BICARBONATE 650 MG TABLET 1300 MG: at 08:27

## 2021-05-14 RX ADMIN — SODIUM BICARBONATE 650 MG TABLET 1300 MG: at 16:53

## 2021-05-14 RX ADMIN — CITALOPRAM HYDROBROMIDE 20 MG: 20 TABLET ORAL at 08:27

## 2021-05-14 RX ADMIN — OXYCODONE HYDROCHLORIDE 2.5 MG: 5 TABLET ORAL at 16:51

## 2021-05-14 RX ADMIN — Medication 1000 UNITS: at 08:27

## 2021-05-14 RX ADMIN — METOPROLOL TARTRATE 25 MG: 25 TABLET, FILM COATED ORAL at 17:00

## 2021-05-14 RX ADMIN — Medication 150 MG: at 08:27

## 2021-05-14 RX ADMIN — METOPROLOL TARTRATE 25 MG: 25 TABLET, FILM COATED ORAL at 08:27

## 2021-05-14 RX ADMIN — RUXOLITINIB 10 MG: 10 TABLET ORAL at 08:28

## 2021-05-14 RX ADMIN — Medication 250 MG: at 08:27

## 2021-05-14 RX ADMIN — MELATONIN TAB 3 MG 3 MG: 3 TAB at 21:19

## 2021-05-14 RX ADMIN — POLYETHYLENE GLYCOL 3350 17 G: 17 POWDER, FOR SOLUTION ORAL at 08:27

## 2021-05-14 RX ADMIN — ATORVASTATIN CALCIUM 40 MG: 40 TABLET, FILM COATED ORAL at 16:51

## 2021-05-14 RX ADMIN — IOHEXOL 10 ML: 350 INJECTION, SOLUTION INTRAVENOUS at 16:04

## 2021-05-14 RX ADMIN — DOCUSATE SODIUM 100 MG: 100 CAPSULE ORAL at 17:00

## 2021-05-14 RX ADMIN — CEFTRIAXONE 1000 MG: 2 INJECTION, POWDER, FOR SOLUTION INTRAMUSCULAR; INTRAVENOUS at 08:34

## 2021-05-14 RX ADMIN — CALCITRIOL 0.25 MCG: 0.25 CAPSULE, LIQUID FILLED ORAL at 08:27

## 2021-05-14 RX ADMIN — AMPICILLIN SODIUM 2000 MG: 2 INJECTION, POWDER, FOR SOLUTION INTRAMUSCULAR; INTRAVENOUS at 18:28

## 2021-05-14 RX ADMIN — DOCUSATE SODIUM 100 MG: 100 CAPSULE ORAL at 08:27

## 2021-05-14 RX ADMIN — LEVOTHYROXINE SODIUM 75 MCG: 75 TABLET ORAL at 05:18

## 2021-05-14 NOTE — CONSULTS
Interventional Radiology  Consultation 5/14/2021     Consults  Rory Boeck   [birth year]   [id number]      Assessment/Plan:  77year old female with left nephrostomy tube placed 5/10/21  There is report leakage around the newly placed tube  The patient will require a tube check and possible exchange  -IR today for tube check and possible exchange of left nephrostomy tube  Problem List     * (Principal) Gross hematuria    Chronic saddle pulmonary embolism (HCC)    Bladder mass    Small bowel obstruction (HCC)    Obstructive uropathy    Overview Signed 5/17/2018 11:36 AM by Stef Salomon MD     Obstructive uropathy and non functional bladder s/p supratrigonal cystectomy and ileal conduit 10/04/2016 by Dr Talib Tran          Secondary hyperparathyroidism of renal origin ([facility] [address] )    Hypertension    Polycythemia vera ([facility] [address] )    Intraventricular hemorrhage ([facility] [address] )    Anemia in CKD (chronic kidney disease)    Mass of urethra    Stage 5 chronic kidney disease not on chronic dialysis [facility])    Lab Results   Component Value Date    EGFR 12 05/14/2021    EGFR 11 05/13/2021    EGFR 12 05/12/2021    CREATININE 3 46 (H) 05/14/2021    CREATININE 3 70 (H) 05/13/2021    CREATININE 3 66 (H) 05/12/2021         Thoracic compression fracture (HCC)    Benign neoplasm of supratentorial region of brain ([facility] [address] )    Meningioma (HCC)    Inverted T wave    Polycythemia    History of Clostridioides difficile colitis    History of shingles    Depression    Adult BMI 39 0-39 9 kg/sq m    Chronic thrombosis of subclavian vein (HCC)    Overview Signed 1/5/2021  9:05 AM by Cortes Anthony MD     right         Hyperlipemia    Hydronephrosis of left kidney    Anemia    Constipation            Subjective:     Patient ID: Rory Boeck is a 77 y o  female  History of Present Illness  77year old female with left nephrostomy tube placed 5/10/21  There is report leakage around the newly placed tube   The patient will require a tube check and possible exchange  Review of Systems  Not performed    Past Medical History:   Diagnosis Date    Anxiety     Bowel obstruction (HCC)     Chronic kidney disease (CKD), stage IV (severe) (HCC)     stage IV    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    Circulation problem     Compression fracture of cervical spine (HCC)     Hernia of abdominal cavity     History of kidney problems     Hydronephrosis     Hypertension     IBS (irritable bowel syndrome)     Incontinence     Lung mass     Improving on PET/CT 1/2016    Polycythemia vera ([facility] [address] )     Pulmonary embolism ([facility] [address] ) 2014    Shingles     Urinary tract infection         Past Surgical History:   Procedure Laterality Date    ABDOMINAL ADHESION SURGERY N/A 8/9/2020    Procedure: LYSIS ADHESIONS;  Surgeon: Aubrie Dixon DO;  [facility];  Service: General    BLADDER SUSPENSION      BOTOX INJECTION N/A 7/27/2016    Procedure: BOTOX INJECTION ;  Surgeon: Kat Meléndez MD;  [facility];  Service:     CHOLECYSTECTOMY N/A     COLONOSCOPY      CYSTECTOMY, RADICAL WITH ILEOCONDUIT N/A 10/4/2016    Procedure: SUPRATRIGONAL CYSTECTOMY WITH ILEAL CONDUIT ;  Surgeon: Kat Meléndez MD;  [facility];  Service:    Alycia Go W/ RETROGRADES Bilateral 7/27/2016    Procedure: CYSTOSCOPY; RETROGRADE PYELOGRAM ;  Surgeon: Kat Meléndez MD;  [facility];  Service:     HERNIA REPAIR      IR AV FISTULAGRAM/GRAFTOGRAM  2/10/2021    IR NEPHROSTOMY TUBE PLACEMENT  5/10/2021    IR NON-TUNNELED CENTRAL LINE PLACEMENT  7/17/2020    IR NON-TUNNELED CENTRAL LINE PLACEMENT  8/14/2020    LAPAROTOMY N/A 8/9/2020    Procedure: LAPAROTOMY EXPLORATORY, PARASTOMAL HERNIA REPAIR WITH MESH;  Surgeon: Aubrie Dixon DO;  [facility];  Service: General    CO ANASTOMOSIS,AV,ANY SITE Right 1/5/2021    Procedure: Creation of right brachiobasilic fistula;   Surgeon: Natale Olszewski, MD;  [facility]; Service: Vascular    NH COLONOSCOPY FLX DX W/COLLJ SPEC WHEN PFRMD N/A 8/31/2016    Procedure: COLONOSCOPY;  Surgeon: Tammy Angulo MD;  Location: [facility]; Service: Gastroenterology    NH CYSTOSCOPY,INSERT URETERAL STENT Bilateral 7/27/2016    Procedure: STENT INSERTION; EXCISION OF MESH ;  Surgeon: Cathleen Osorio MD;  [facility];  Service: Urology    TONSILLECTOMY      TUBAL LIGATION      WISDOM TOOTH EXTRACTION          Social History     Tobacco Use   Smoking Status Never Smoker   Smokeless Tobacco Never Used   Tobacco Comment    n/a        Social History     Substance and Sexual Activity   Alcohol Use Not Currently    Frequency: Never    Binge frequency: Never    Comment: n/s        Social History     Substance and Sexual Activity   Drug Use Not Currently    Comment: n/a        Allergies   Allergen Reactions    Chlorhexidine Rash     petichi like rash when using chlorhexidine swabs prior to IV          Current Facility-Administered Medications   Medication Dose Route Frequency Provider Last Rate Last Admin    acetaminophen (TYLENOL) tablet 650 mg  650 mg Oral Q6H PRN Tillman Knuckles, DO        atorvastatin (LIPITOR) tablet 40 mg  40 mg Oral Daily With [facility], DO   40 mg at 05/13/21 1657    calcitriol (ROCALTROL) capsule 0 25 mcg  0 25 mcg Oral Every Other Day [facility]   0 25 mcg at 05/14/21 0827    cefTRIAXone (ROCEPHIN) 1,000 mg in dextrose 5 % 50 mL IVPB  1,000 mg Intravenous Q24H Beverley Murcia PA-C 100 mL/hr at 05/14/21 0834 1,000 mg at 05/14/21 0834    cholecalciferol (VITAMIN D3) tablet 1,000 Units  1,000 Units Oral Daily Tillman Knuckles, DO   1,000 Units at 05/14/21 0827    citalopram (CeleXA) tablet 20 mg  20 mg Oral Daily [facility]   20 mg at 05/14/21 0827    docusate sodium (COLACE) capsule 100 mg  100 mg Oral BID Beverley Murcia PA-C   100 mg at 05/14/21 0827    fentaNYL (SUBLIMAZE) injection 25 mcg  25 mcg Intravenous Q5 Min PRN Jefry Joel CRNA        iron polysaccharides (FERREX) capsule 150 mg  150 mg Oral Daily Mahsa Nicolas MD   150 mg at 05/14/21 0827    levofloxacin (LEVAQUIN) IVPB (premix in dextrose) 500 mg 100 mL  500 mg Intravenous Once Tavon Gentile MD        levothyroxine tablet 75 mcg  75 mcg Oral Early Morning Pamette Arabia, DO   75 mcg at 05/14/21 0518    melatonin tablet 3 mg  3 mg Oral HS Pamette Arabia, DO   3 mg at 05/13/21 2123    metoprolol tartrate (LOPRESSOR) tablet 25 mg  25 mg Oral BID Pamette Arabia, DO   25 mg at 05/14/21 0827    ondansetron (ZOFRAN) injection 4 mg  4 mg Intravenous Once PRN Andrew Velasquez CRNA        oxyCODONE (ROXICODONE) IR tablet 2 5 mg  2 5 mg Oral Q4H PRN Roberta Abad PA-C   2 5 mg at 05/13/21 1342    polyethylene glycol (MIRALAX) packet 17 g  17 g Oral BID Beverley Murcia PA-C   17 g at 05/14/21 0827    ruxolitinib (JAKAFI) tablet 10 mg  10 mg Oral Daily Beatrice Vital MD   10 mg at 05/14/21 0455    saccharomyces boulardii (FLORASTOR) capsule 250 mg  250 mg Oral Daily Ivette Arabia, DO   250 mg at 05/14/21 0827    simethicone (MYLICON) chewable tablet 80 mg  80 mg Oral Q6H PRN Laurita Ascencio PA-C   80 mg at 05/11/21 1305    sodium bicarbonate tablet 1,300 mg  1,300 mg Oral BID after meals Mahsa Nicolas MD   1,300 mg at 05/14/21 0827          Objective:    Vitals:    05/13/21 1529 05/13/21 1823 05/13/21 2208 05/14/21 0810   BP: 150/85 141/82 138/80 124/67   BP Location:   Left arm    Pulse:       Resp: 16  16 12   Temp: 98 °F (36 7 °C)  97 8 °F (36 6 °C) (!) 97 3 °F (36 3 °C)   TempSrc:   Oral    SpO2:       Weight:       Height:            Physical Exam  Not performed    No results found for: BNP   Lab Results   Component Value Date    WBC 5 66 05/14/2021    HGB 8 1 (L) 05/14/2021    HCT 27 2 (L) 05/14/2021    MCV 96 05/14/2021     05/14/2021     Lab Results   Component Value Date    INR 1 39 (H) 05/11/2021    INR 1 27 (H) 05/10/2021    INR 1 70 (H) 05/08/2021    PROTIME 17 0 (H) 05/11/2021    PROTIME 15 9 (H) 05/10/2021    PROTIME 19 9 (H) 05/08/2021     Lab Results   Component Value Date    PTT 34 05/08/2021         I have personally reviewed pertinent imaging and laboratory results  Code Status: Level 1 - Full Code  Advance Directive and Living Will:      Power of :    POLST:      IR has been consulted to evaluate the patient, determine the appropriate procedure, and whether or not a procedure can and should be performed  Thank you for allowing me to participate in the care of Jose Parson  Please don't hesitate to call, text, email, or TigerText with any questions  This text is generated with voice recognition software  There may be translation, syntax,  or grammatical errors  If you have any questions, please contact the dictating provider

## 2021-05-14 NOTE — SEDATION DOCUMENTATION
Successful left nephrostomy tube exchange by Dr Mami Nettles without complications, tolerated well by pt  Report called to Michael Martínez

## 2021-05-14 NOTE — ASSESSMENT & PLAN NOTE
· Urine cultures and body fluid culture from IR procedure on 05/10 are growing ampicillin susceptible Enterococcus, ceftriaxone susceptible Morganella and Enterobacter cloacae  · Patient has known history of cystectomy and ileal conduit  · Continue ceftriaxone IV, start ampicillin IV  · ID consult

## 2021-05-14 NOTE — DISCHARGE INSTRUCTIONS
Nephrostomy Tube Care     WHAT YOU NEED TO KNOW:   A nephrostomy tube is a catheter (thin plastic tube) that is inserted through your skin and into your kidney  The nephrostomy tube drains urine from your kidney into a collecting bag outside your body  You may need a nephrostomy tube when something is blocking the normal flow of urine  A nephrostomy tube may be used for a short or a long period of time  The nephrostomy tube comes out of your back, so you will need someone to help care for your nephrostomy tube  DISCHARGE INSTRUCTIONS:      How to clean the skin around the nephrostomy tube and change the bandage:  Since the nephrostomy tube comes out of your back, you will not be able to care for it by yourself  Ask someone to follow the general directions below to check and care for your nephrostomy tube  Gather the items you will need  Disposable (single use) under-pad, and a clean washcloth  ¨ Plain soap, warm water, and new medical gloves  ¨ Sterile gauze bandages  ¨ Clear adhesive dressing or medical tape  ¨ Skin barrier  ¨ Protective skin film  ¨ Trash bag  · Remove the old bandage, and check the tube entry site  ¨ Have the patient lie on his side with the nephrostomy tube entry site facing up  Place the under-pad where it will catch drainage as you are working with the nephrostomy tube  ¨ Wash your hands with soap and water  Put on new medical gloves  ¨ Gently remove the old bandage, without pulling on the tube  Do this by holding the skin beside the tube with one hand  With the other hand, gently remove sticky tape and the skin barrier by pulling in the same direction as hair growth  Do not touch the side of the bandage that is placed over or around the tube  Throw the bandage and skin barrier away in a trash bag  ¨ Look for signs of infection, such as skin redness and swelling  Report any skin changes to healthcare providers  ¨ Clean the tube entry site      ¨ Hold the tube in place to keep it from being pulled out while you are cleaning around it  ¨ You will need to clean the area twice  For the first cleaning, wet a new gauze bandage with soap and water  Begin at the entry site of the tube  Wipe the skin in circles, moving away from the entry site  Remove blood and any other material with the gauze  Do this as often as needed  Use a new gauze bandage each time you clean the area, moving away from the entry site  ¨ For the second cleaning, wet a new gauze bandage with water  Begin at the entry site of the tube  Wipe the skin in circles, moving away from the entry site  Use a new gauze bandage each time you clean the area, moving away from the entry site  ¨ Gently pat the skin with a clean washcloth to dry it  · Apply the skin barrier and bandages  ¨ Roll up a bandage to make it thick, and place it under  the place where the tube enters the skin  Place it to support the tube, and stop it from kinking or bending  Tape the bandage in place, and apply more bandages if directed by a healthcare provider  ¨ Bring the tubing forward to the front and tape it to the skin  Do not stretch the tube tight, because this may pull the nephrostomy tube out  How often to change the bandage  Change the bandage around the tube, every other day  If your bandages  get dirty or wet, change them right away, and as often as needed  If your nephrostomy tube is to be used for a long period of time, the tube needs to be changed every 2 to 3 months  Healthcare providers will tell you when you need to make an appointment to have your tube changed  How to care for the urine drainage bag:   · Ask if you need to measure and write down how much urine is in the bag before you empty it  Drain urine out of the drainage bag when it is ½ to ? full  Open the spout at the bottom of the bag to empty the urine into the toilet  · You may need to detach the drainage bag from the nephrostomy tube to change it    If so, attach a new drainage bag tightly to the nephrostomy tube  ·   How to prevent problems with your nephrostomy tube:   · Change bandages, directed  This helps to prevent infection  Throw away or clean your drainage bag as directed by your healthcare provider  · Wipe the connecting ends of the drainage bag with alcohol before you reconnect the bag to the tube  This helps prevent infection  Keep the tube taped to your skin and connected to a drainage bag placed below the level of your kidneys  This helps prevent urine from backing up into your kidneys  You may wear a small drainage bag strapped to your leg to let you move around more easily  · Check the catheter to be sure it is in place after you change your clothes or do other activities  Do not wear tight clothing over the tube  Place the tubing over your thigh rather than under it when you are sitting down  Be sure that nothing is pulling on the nephrostomy tube when you move around  · Change positions if you see little or no urine in your drainage bag  Check to see if the urine tube is twisted or bent  Be sure that you are not sitting or lying on the tube  If there are no kinks and there is little or no urine in the drainage bag, tell your healthcare provider  · Flush out the tube as directed  Some tubes get flushed one time a day with 10 mls of NSS You will be given a prescription for the flushes  To flush the nephrostomy tube, clean both connections with alcohol swap  Twist off the drainage bag tube and twist the saline syringe into the nephrostomy tube and flush briskly  Remove the syringe and twist the drainage bag tube back into the nephrostomy tube  · Keep the site covered while you shower  Tape a piece of clear adhesive plastic over the dressing to keep it dry while you shower  Do not take tub baths      Contact Interventional Radiology at [phone] [facility] PATIENTS: Contact Interventional Radiology at [phone]) Amandeep Louis PATIENTS: Contact Interventional Radiology at [phone]) if:  · The skin around the nephrostomy tube is red, swollen, itches, or has a rash  · You have a fever greater than 101 or chills  · You have lower back or hip pain  · There are changes in how your urine looks or smells  · You have little or no urine draining from the nephrostomy tube  · You have nausea and are vomiting  · The black amado on your tube has moved, or the tube is longer than when it was put in    · You have questions or concerns about your condition or care  · The nephrostomy tube comes out completely  · There is blood, pus, or a bad smell coming from the place where the tube enters your skin  · Urine is leaking around the tube  The following pharmacies carry the flush syringes  [facility]                     [facility]       [address] [facility]                    [id number] [place] [address]  Phone [phone]            Phone [phone]  [address] & [facility]                      [address]                                 [phone]  Phone [phone]            Phone [phone]    [facility]                                                                         [facility] [phone]  [address]   Phone [phone]        Nephrostomy Tube Care     WHAT YOU NEED TO KNOW:   A nephrostomy tube is a catheter (thin plastic tube) that is inserted through your skin and into your kidney  The nephrostomy tube drains urine from your kidney into a collecting bag outside your body  You may need a nephrostomy tube when something is blocking the normal flow of urine  A nephrostomy tube may be used for a short or a long period of time  The nephrostomy tube comes out of your back, so you will need someone to help care for your nephrostomy tube  DISCHARGE INSTRUCTIONS:      How to clean the skin around the nephrostomy tube and change the bandage:  Since the nephrostomy tube comes out of your back, you will not be able to care for it by yourself  Ask someone to follow the general directions below to check and care for your nephrostomy tube  Gather the items you will need  Disposable (single use) under-pad, and a clean washcloth  ¨ Plain soap, warm water, and new medical gloves  ¨ Sterile gauze bandages  ¨ Clear adhesive dressing or medical tape  ¨ Skin barrier  ¨ Protective skin film  ¨ Trash bag  · Remove the old bandage, and check the tube entry site  ¨ Have the patient lie on his side with the nephrostomy tube entry site facing up  Place the under-pad where it will catch drainage as you are working with the nephrostomy tube  ¨ Wash your hands with soap and water  Put on new medical gloves  ¨ Gently remove the old bandage, without pulling on the tube  Do this by holding the skin beside the tube with one hand  With the other hand, gently remove sticky tape and the skin barrier by pulling in the same direction as hair growth  Do not touch the side of the bandage that is placed over or around the tube  Throw the bandage and skin barrier away in a trash bag  ¨ Look for signs of infection, such as skin redness and swelling  Report any skin changes to healthcare providers  ¨ Clean the tube entry site  ¨ Hold the tube in place to keep it from being pulled out while you are cleaning around it  ¨ You will need to clean the area twice  For the first cleaning, wet a new gauze bandage with soap and water  Begin at the entry site of the tube   Wipe the skin in circles, moving away from the entry site  Remove blood and any other material with the gauze  Do this as often as needed  Use a new gauze bandage each time you clean the area, moving away from the entry site  ¨ For the second cleaning, wet a new gauze bandage with water  Begin at the entry site of the tube  Wipe the skin in circles, moving away from the entry site  Use a new gauze bandage each time you clean the area, moving away from the entry site  ¨ Gently pat the skin with a clean washcloth to dry it  · Apply the skin barrier and bandages  ¨ Roll up a bandage to make it thick, and place it under  the place where the tube enters the skin  Place it to support the tube, and stop it from kinking or bending  Tape the bandage in place, and apply more bandages if directed by a healthcare provider  ¨ Bring the tubing forward to the front and tape it to the skin  Do not stretch the tube tight, because this may pull the nephrostomy tube out  How often to change the bandage  Change the bandage around the tube, every other day  If your bandages  get dirty or wet, change them right away, and as often as needed  If your nephrostomy tube is to be used for a long period of time, the tube needs to be changed every 2 to 3 months  Healthcare providers will tell you when you need to make an appointment to have your tube changed  How to care for the urine drainage bag:   · Ask if you need to measure and write down how much urine is in the bag before you empty it  Drain urine out of the drainage bag when it is ½ to ? full  Open the spout at the bottom of the bag to empty the urine into the toilet  · You may need to detach the drainage bag from the nephrostomy tube to change it    If so, attach a new drainage bag tightly to the nephrostomy tube  ·   How to prevent problems with your nephrostomy tube:   · Change bandages, directed  This helps to prevent infection   Throw away or clean your drainage bag as directed by your healthcare provider  · Wipe the connecting ends of the drainage bag with alcohol before you reconnect the bag to the tube  This helps prevent infection  Keep the tube taped to your skin and connected to a drainage bag placed below the level of your kidneys  This helps prevent urine from backing up into your kidneys  You may wear a small drainage bag strapped to your leg to let you move around more easily  · Check the catheter to be sure it is in place after you change your clothes or do other activities  Do not wear tight clothing over the tube  Place the tubing over your thigh rather than under it when you are sitting down  Be sure that nothing is pulling on the nephrostomy tube when you move around  · Change positions if you see little or no urine in your drainage bag  Check to see if the urine tube is twisted or bent  Be sure that you are not sitting or lying on the tube  If there are no kinks and there is little or no urine in the drainage bag, tell your healthcare provider  · Flush out the tube as directed  Some tubes get flushed one time a day with 10 mls of NSS You will be given a prescription for the flushes  To flush the nephrostomy tube, clean both connections with alcohol swap  Twist off the drainage bag tube and twist the saline syringe into the nephrostomy tube and flush briskly  Remove the syringe and twist the drainage bag tube back into the nephrostomy tube  · Keep the site covered while you shower  Tape a piece of clear adhesive plastic over the dressing to keep it dry while you shower  Do not take tub baths  Contact Interventional Radiology at [phone] [facility] PATIENTS: Contact Interventional Radiology at [phone]) Grace Corcoran PATIENTS: Contact Interventional Radiology at [phone]) if:  · The skin around the nephrostomy tube is red, swollen, itches, or has a rash  · You have a fever greater than 101 or chills  · You have lower back or hip pain    · There are changes in how your urine looks or smells  · You have little or no urine draining from the nephrostomy tube  · You have nausea and are vomiting  · The black amado on your tube has moved, or the tube is longer than when it was put in    · You have questions or concerns about your condition or care  · The nephrostomy tube comes out completely  · There is blood, pus, or a bad smell coming from the place where the tube enters your skin  · Urine is leaking around the tube  The following pharmacies carry the flush syringes  [facility]       [address]  Phone [phone]            Phone [phone]  [address] & [facility]                      [address]                                 [phone]  Phone [phone]            Phone [phone]    [facility]                                                                         [facility] [phone]  [address]   Phone [phone]

## 2021-05-14 NOTE — ASSESSMENT & PLAN NOTE
Lab Results   Component Value Date    EGFR 12 05/14/2021    EGFR 11 05/13/2021    EGFR 12 05/12/2021    CREATININE 3 46 (H) 05/14/2021    CREATININE 3 70 (H) 05/13/2021    CREATININE 3 66 (H) 05/12/2021   Estimated Creatinine Clearance: 14 2 mL/min (A) (by C-G formula based on SCr of 3 46 mg/dL (H))    Cr near baseline 3 4-3 5, follows with Dr Eliane Snider  · No significant improvement s/p nephrostomy tube  · Renal input appreciated   · Monitor BMP

## 2021-05-14 NOTE — PROGRESS NOTES
Progress Note - Nephrology   Nereyda Culp 77 y o  female MRN: [id number]  Unit/Bed#: [facility] 803-01 Encounter: [id number]    ASSESSMENT AND PLAN:  80-year-old female with history cystectomy ileal conduit and chronic hydronephrosis with advanced chronic kidney disease stage 5 followed by Dr Eduarda Covington:  She now presents with gross hematuria, CT scan demonstrated left hydronephrosis and perinephric stranding and more acute obstruction possible pyelonephritis   Status post antibiotics and left PCN placed 5/10     1  CKD stage 5:  -baseline creatinine 3 5-3 6 followed by Dr Eduarda Covington  -etiology felt secondary to obstructive uropathy with functional solitary right kidney frequent episodes of acute kidney injury    -UA with innumerable RBCs WBCs and bacteria  CURRENT CREATININE:  3 46 at baseline  Recommend:  -monitor renal function   -follow-up regarding the AV fistula  -avoid nephrotoxic agents such as NSAIDs  -avoid hypotension     2  Blood pressure is  doing well today at 124/67  3  Electrolytes:  Metabolic acidosis is doing well at a level of 23 on increase sodium bicarbonate  4  MBD:   magnesium and phosphorus both acceptable  On calcitriol 3 times a week  5  Anemia:  Hemoglobin 8 1 most likely secondary to chronic disease and chronic kidney disease:  NO JENELLE DUE TO HISTORY OF PE, transfuse as needed for hemoglobin less than 7 0  · Low iron studies:  Now on oral iron  6  Status post left PCN for obstructive uropathy now being re-initiated on anticoagulants: Will be re-evaluated by IR today  Subjective:   Patient is asymptomatic  No chest pain or shortness of breath  No nausea vomiting or diarrhea  Apparently yesterday there is a leakage of the left PCN  Patient will go for IR tube checked today possible exchange  Currently PCN is draining well with clear urine  She denies any chest pain or shortness of breath  No nausea vomiting or diarrhea  Good ileal conduit drainage    No left flank pain      Objective:     Vitals: Blood pressure 124/67, pulse 77, temperature (!) 97 3 °F (36 3 °C), resp  rate 12, height 5' (1 524 m), weight 89 5 kg (197 lb 4 8 oz), SpO2 95 %, not currently breastfeeding  ,Body mass index is 38 53 kg/m²      Weight (last 2 days)     None            Intake/Output Summary (Last 24 hours) at 5/14/2021 1304  Last data filed at 5/14/2021 0810  Gross per 24 hour   Intake 521 05 ml   Output 975 ml   Net -453 95 ml            Physical Exam: General:  Obese, No acute distress  Skin:  No acute rash  Eyes:  No scleral icterus and noninjected  ENT:  Moist mucous membranes  Neck:  Supple, no jugular venous distention, trachea midline, overall appearance is normal  Chest:  Clear to auscultation  CVS:  Regular rate and rhythm, without a rub or gallops  Abdomen:  Obese, Normal bowel sounds, soft and nontender and nondistended; ileal conduit draining well  Back:  Left PCN bandage trying clean at this time  Extremities:  No edema, and no cyanosis, no significant arthritic changes  Neuro:  No gross focality  Psych:  Alert and oriented and appropriate                Medications:    Scheduled Meds:  Current Facility-Administered Medications   Medication Dose Route Frequency Provider Last Rate    acetaminophen  650 mg Oral Q6H PRN Jose M Reason, DO      atorvastatin  40 mg Oral Daily With [facility], DO      calcitriol  0 25 mcg Oral Every Other Day Jose M Reason, DO      cefTRIAXone  1,000 mg Intravenous Q24H Beverley Murcia PA-C 1,000 mg (05/14/21 0834)    cholecalciferol  1,000 Units Oral Daily Jose M Reason, DO      citalopram  20 mg Oral Daily Jose M Reason, DO      docusate sodium  100 mg Oral BID Leticia Johnson PA-C      fentaNYL  25 mcg Intravenous Q5 Min PRN Ethelda Socks, CRNA      iron polysaccharides  150 mg Oral Daily Toyin Keating MD      levofloxacin  500 mg Intravenous Once Angelina Rosales MD      levothyroxine  75 mcg Oral Early Morning Jose M Reason, DO      melatonin  3 mg Oral HS Freada Simper, DO      metoprolol tartrate  25 mg Oral BID Freada Simper, DO      ondansetron  4 mg Intravenous Once PRN Cecelia Cherry CRNA      oxyCODONE  2 5 mg Oral Q4H PRN Roberta Abad PA-C      polyethylene glycol  17 g Oral BID Abigail Lim PA-C      ruxolitinib  10 mg Oral Daily Lani Suarez MD      saccharomyces boulardii  250 mg Oral Daily Freada Simper, DO      simethicone  80 mg Oral Q6H PRN Abigail Lim PA-C      sodium bicarbonate  1,300 mg Oral BID after meals Fermín Kiser MD         PRN Meds:   acetaminophen    fentaNYL    ondansetron    oxyCODONE    simethicone    Continuous Infusions:     Lab, Imaging and other studies: I have personally reviewed pertinent labs    Laboratory Results:  Results from last 7 days   Lab Units 05/14/21  0511 05/13/21  9113 05/13/21  0456 05/12/21  0443 05/11/21  0441 05/10/21  0511 05/09/21  0528 05/08/21  1831 05/08/21  1219   WBC Thousand/uL 5 66 5 61  --  6 06 8 71 5 17 4 70  --  5 35   HEMOGLOBIN g/dL 8 1* 8 3*  --  7 3* 7 5* 8 0* 8 0* 7 9* 8 7*   HEMATOCRIT % 27 2* 26 5*  --  23 4* 24 7* 26 1* 25 8* 25 7* 27 8*   PLATELETS Thousands/uL 366 317  --  331 353 347 344  --  395*   POTASSIUM mmol/L 4 6  --  4 3 4 5 4 7 4 5 4 4  --  5 0   CHLORIDE mmol/L 111*  --  112* 109* 112* 112* 110*  --  109*   CO2 mmol/L 23  --  18* 20* 19* 20* 21  --  19*   BUN mg/dL 56*  --  55* 44* 38* 44* 43*  --  42*   CREATININE mg/dL 3 46*  --  3 70* 3 66* 3 62* 3 52* 3 57*  --  3 68*   CALCIUM mg/dL 8 9  --  8 7 8 6 8 3 8 2* 8 3  --  8 9   MAGNESIUM mg/dL  --   --   --  1 9  --   --  2 0  --   --    PHOSPHORUS mg/dL  --   --   --  3 5  --   --  3 4  --   --      Urinalysis:   Lab Results   Component Value Date    COLORU Brown 05/08/2021    COLORU Yellow 09/09/2015    CLARITYU Turbid 05/08/2021    CLARITYU Cloudy 09/09/2015    SPECGRAV 1 013 05/08/2021    SPECGRAV <=1 005 09/09/2015    PHUR Interference-unable to analyze (A) 05/08/2021    PHUR 6 5 07/14/2020    PHUR 6 0 09/09/2015    LEUKOCYTESUR Interference- unable to analyze (A) 05/08/2021    LEUKOCYTESUR Large (A) 09/09/2015    NITRITE Interference- unable to analyze 05/08/2021    NITRITE Positive (A) 09/09/2015    PROTEINUA 30 (1+) (A) 09/09/2015    GLUCOSEU Interference- unable to analyze 05/08/2021    GLUCOSEU Negative 09/09/2015    KETONESU Interference- unable to analyze (A) 05/08/2021    KETONESU Negative 09/09/2015    BILIRUBINUR Interference- unable to analyze (A) 05/08/2021    BILIRUBINUR Negative 09/09/2015    BLOODU Interference- unable to analyze (A) 05/08/2021    BLOODU Moderate (A) 09/09/2015     ABGs:   Lab Results   Component Value Date    PH 7 444 10/04/2016     Radiology review:     Portions of the record may have been created with voice recognition software  Occasional wrong word or "sound a like" substitutions may have occurred due to the inherent limitations of voice recognition software  Read the chart carefully and recognize, using context, where substitutions have occurred

## 2021-05-14 NOTE — ASSESSMENT & PLAN NOTE
Many years ago per patient  · Eliquis held due to hematuria and need for nephrostomy tube placement  · Urology cleared pt to restart anticoagulation on 5/14 per team, as discussion with them on 5/13  · Pt underwent Exchange of left PCN by IR on 5/14   · Restart anticoagulation in yulissa Calix

## 2021-05-14 NOTE — PHYSICAL THERAPY NOTE
Physical Therapy Treatment note     Patient Name: Sherren Greenspan    OGWOC'A Date: 5/14/2021     Problem List  Principal Problem:    Gross hematuria  Active Problems:    Chronic saddle pulmonary embolism ([facility] [address] )    Obstructive uropathy    Polycythemia vera ([facility] )    Stage 5 chronic kidney disease not on chronic dialysis ([facility] )    Hyperlipemia    Hydronephrosis of left kidney    Anemia    Constipation       Past Medical History  Past Medical History:   Diagnosis Date    Anxiety     Bowel obstruction (HCC)     Chronic kidney disease (CKD), stage IV (severe) (HCC)     stage IV    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    Circulation problem     Compression fracture of cervical spine (HCC)     Hernia of abdominal cavity     History of kidney problems     Hydronephrosis     Hypertension     IBS (irritable bowel syndrome)     Incontinence     Lung mass     Improving on PET/CT 1/2016    Polycythemia vera ([facility] [address] [facility] )     Pulmonary embolism ([facility] [address] ) 2014    Shingles     Urinary tract infection         Past Surgical History  Past Surgical History:   Procedure Laterality Date    ABDOMINAL ADHESION SURGERY N/A 8/9/2020    Procedure: LYSIS ADHESIONS;  Surgeon: Mark Ferguson DO;  [facility];  Service: General    BLADDER SUSPENSION      BOTOX INJECTION N/A 7/27/2016    Procedure: BOTOX INJECTION ;  Surgeon: Desirae Johnson MD;  [facility];  Service:     CHOLECYSTECTOMY N/A     COLONOSCOPY      CYSTECTOMY, RADICAL WITH ILEOCONDUIT N/A 10/4/2016    Procedure: [address] [place] [address] ;  Surgeon: Desirae Johnson MD;  [facility];  Service:    Ewa Feathers W/ RETROGRADES Bilateral 7/27/2016    Procedure: CYSTOSCOPY; RETROGRADE PYELOGRAM ;  Surgeon: Desirae Johnson MD;  [facility];  Service:     HERNIA REPAIR      IR AV FISTULAGRAM/GRAFTOGRAM  2/10/2021    IR NEPHROSTOMY TUBE PLACEMENT 5/10/2021    IR NON-TUNNELED CENTRAL LINE PLACEMENT  7/17/2020    IR NON-TUNNELED CENTRAL LINE PLACEMENT  8/14/2020    LAPAROTOMY N/A 8/9/2020    Procedure: LAPAROTOMY EXPLORATORY, PARASTOMAL HERNIA REPAIR WITH MESH;  Surgeon: Marcus Toney DO;  [facility];  Service: General    CO ANASTOMOSIS,AV,ANY SITE Right 1/5/2021    Procedure: Creation of right brachiobasilic fistula; Surgeon: Benedicto Chairez MD;  [facility];  Service: Vascular    CO COLONOSCOPY FLX DX W/COLLJ SPEC WHEN PFRMD N/A 8/31/2016    Procedure: COLONOSCOPY;  Surgeon: Afsaneh Barrios MD;  [facility]; Service: Gastroenterology    CO CYSTOSCOPY,INSERT URETERAL STENT Bilateral 7/27/2016    Procedure: STENT INSERTION; EXCISION OF MESH ;  Surgeon: David Harris MD;  [facility];  Service: Urology    TONSILLECTOMY      TUBAL LIGATION      WISDOM TOOTH EXTRACTION           05/14/21 1000   PT Last Visit   PT Visit Date 05/14/21   Note Type   Note Type Treatment   Pain Assessment   Pain Assessment Tool Pain Assessment not indicated - pt denies pain   Pain Score No Pain   Restrictions/Precautions   Weight Bearing Precautions Per Order No   Other Precautions Fall Risk   General   Chart Reviewed Yes   Family/Caregiver Present No   Cognition   Overall Cognitive Status WFL   Arousal/Participation Alert; Cooperative   Attention Within functional limits   Orientation Level Oriented X4   Memory Within functional limits   Following Commands Follows all commands and directions without difficulty   Bed Mobility   Additional Comments pt sitting in chair at the begining of the session   Transfers   Sit to Stand 5  Supervision   Stand to Sit 5  Supervision   Ambulation/Elevation   Gait pattern Shuffling   Gait Assistance 5  Supervision   Assistive Device Rolling walker   Distance [id number], [id number]   Stair Management Assistance 5  Supervision   Stair Management Technique One rail R   Number of Stairs 4  (limited by lines ) Balance   Static Sitting Good   Dynamic Sitting Fair +   Static Standing Fair   Dynamic Standing Fair   Ambulatory Fair   Endurance Deficit   Endurance Deficit Yes   Activity Tolerance   Activity Tolerance Patient limited by fatigue   Nurse Made Aware nurse approved therapy session   Exercises   Hip Flexion Sitting;Bilateral  (12 resp x 3 sets )   Knee AROM Long Arc Quad Sitting;Bilateral  (12 reps x 3 sets )   Ankle Pumps Sitting;Bilateral  (30 reps x 3 sets )   Assessment   Prognosis Good   Problem List Decreased mobility; Decreased endurance   Assessment Pt presents to therapy today with reduced mobility, gait abnormalities, risk of falling  These impairments limit the patient by requiring rest breaks and places her at risk of falling  Pt would benefit from continued skilled therapy while in the hospital to improve overall mobility and work towards a safe d/c  Recommend home with home PT  At end of session patient was left seated with call bell within reach  The patient's [facility] Basic Mobility Inpatient Short Form Raw Score is 22, Standardized Score is 47 4  A standardized score greater than 42 9 suggests the patient may benefit from discharge to home  Please also refer to the recommendation of the Physical Therapist for safe discharge planning  Pt tolerated session with rest breaks  Pt educated multiple times and to perform exercises  Goals   STG Expiration Date 05/26/21   PT Treatment Day 1   Plan   Treatment/Interventions Functional transfer training;LE strengthening/ROM; Therapeutic exercise; Endurance training;Elevations; Bed mobility;Gait training   Progress Progressing toward goals   PT Frequency Other (Comment)  (3-5xwk)   Recommendation   PT Discharge Recommendation Home with home health rehabilitation   Taniya pritchett   Change/add to RateItAll?  No   PT - OK to Discharge Yes   Additional Comments when medically appropraite    [facility]   Turning in Bed Without Bedrails 4   Lying on Back to Sitting on Edge of Flat Bed 4   Moving Bed to Chair 4   Standing Up From Chair 4   Walk in Room 3   Climb 3-5 Stairs 3   Basic Mobility Inpatient Raw Score 22   Basic Mobility Standardized Score 47 4   Fara Sterling, Pt, DPT

## 2021-05-14 NOTE — BRIEF OP NOTE (RAD/CATH)
INTERVENTIONAL RADIOLOGY PROCEDURE NOTE    Date: 5/14/2021    Procedure: Left PCN exchange    Preoperative diagnosis:   1  Hematuria    2  Hydronephrosis         Postoperative diagnosis: Same  Surgeon: Amy Sidhu MD     Assistant: None  No qualified resident was available  Blood loss: None    Specimens: None     Findings: The existing left PCN appeared retracted  Successful replacement using 8 Fr catheter  Complications: None immediate      Anesthesia: local

## 2021-05-14 NOTE — PLAN OF CARE
Problem: PHYSICAL THERAPY ADULT  Goal: Performs mobility at highest level of function for planned discharge setting  See evaluation for individualized goals  Description: Treatment/Interventions: Functional transfer training, LE strengthening/ROM, Therapeutic exercise, Endurance training, Elevations, Patient/family training, Bed mobility, Equipment eval/education, Gait training  Equipment Recommended: Danielle Enamorado       See flowsheet documentation for full assessment, interventions and recommendations  Outcome: Progressing  Note: Prognosis: Good  Problem List: Decreased mobility, Decreased endurance  Assessment: Pt presents to therapy today with reduced mobility, gait abnormalities, risk of falling  These impairments limit the patient by requiring rest breaks and places her at risk of falling  Pt would benefit from continued skilled therapy while in the hospital to improve overall mobility and work towards a safe d/c  Recommend home with home PT  At end of session patient was left seated with call bell within reach  The patient's [facility] Basic Mobility Inpatient Short Form Raw Score is 22, Standardized Score is 47 4  A standardized score greater than 42 9 suggests the patient may benefit from discharge to home  Please also refer to the recommendation of the Physical Therapist for safe discharge planning  Pt tolerated session with rest breaks  Pt educated multiple times and to perform exercises  PT Discharge Recommendation: Home with home health rehabilitation     PT - OK to Discharge: Yes    See flowsheet documentation for full assessment

## 2021-05-14 NOTE — PROGRESS NOTES
[address] [facility]  Progress Note - Nereyda Culp [id number], 77 y o  female MRN: [id number]  Unit/Bed#: [facility] 803-01 Encounter: [id number]  Primary Care Provider: Michael Chin MD   Date and time admitted to hospital: 5/8/2021 11:18 AM    * Gross hematuria  Assessment & Plan  Patient presented to ED for gross hematuria (likely due to eliquis)  Has PMHx of CKD Stage 5, obstructive uropathy s/p cystectomy with ilial conduit  · CT abdomin pelvis: New left hydronephrosis with perinephric stranding   · Suggests the possibility of more acute obstruction and/or pyelonephritis  · Urology consulted, input appreciated   · IR following, s/p L nephrostomy tube placement on 5/11 with Dr Victorina Brito (IR)  · Monitor H/H, transfuse PRN--s/p 1 unit PRBC 5/12  · Urine cx pre-operatively with mixed contaminants x3  · During IR procedure was noted to have purulent urine, started on abx (cultures mixed post op)  · Will complete abx x5 days  · Team discussed with Urology and they were okay with restarting anticoagulation on 05/14 although patient underwent left PCN exchange on 05/14  · Restart anticoagulation on 05/15  · PT/OT input appreciated, recommending home with home therapy, CM to arrange     Urinary tract infection  Assessment & Plan  · Urine cultures and body fluid culture from IR procedure on 05/10 are growing ampicillin susceptible Enterococcus, ceftriaxone susceptible Morganella and Enterobacter cloacae  · Patient has known history of cystectomy and ileal conduit  · Continue ceftriaxone IV, start ampicillin IV  · ID consult    Constipation  Assessment & Plan  Finally had BM on 5/12  · Continue bowel regimen    Anemia  Assessment & Plan  Anemia noted--likely 2/2 hematuria coupled with anemia of CKD at baseline (baseline around 9-10)  · Trend CBC, s/p unit of PRBC on 5/12 with improvement of Hgb from 7 3-->8 3  · No JENELLE due to PE hx  · Will avoid IV iron as well given ? infection  · Oral iron started     Hydronephrosis of left kidney  Assessment & Plan  IR and urology consulted  · S/P Perc neph tube on 5/11 with IR   · Flush with 10 mL NSS BID  · F/u IR q3 months for routine tube exchanges  · Will need ongoing nephrostomy tube drainage for at least several weeks and then re-eval with antegrade study    Hyperlipemia  Assessment & Plan  · Continue statin    Stage 5 chronic kidney disease not on chronic dialysis [facility])  Assessment & Plan  Lab Results   Component Value Date    EGFR 12 05/14/2021    EGFR 11 05/13/2021    EGFR 12 05/12/2021    CREATININE 3 46 (H) 05/14/2021    CREATININE 3 70 (H) 05/13/2021    CREATININE 3 66 (H) 05/12/2021   Estimated Creatinine Clearance: 14 2 mL/min (A) (by C-G formula based on SCr of 3 46 mg/dL (H))  Cr near baseline 3 4-3 5, follows with Dr Tunde Lazaro  · No significant improvement s/p nephrostomy tube  · Renal input appreciated   · Monitor BMP     Polycythemia vera [facility])  Assessment & Plan  Patient follows up with outpatient Hematology Oncology, currently on Jakafi 10 mg Oral Daily  · Brought medication in from home   · Monitor labs     Obstructive uropathy  Assessment & Plan  S/p ileostomy for nonfunctioning bladder and chronic hydro  · Urology following     Chronic saddle pulmonary embolism ([address] )  Assessment & Plan  Many years ago per patient  · Eliquis held due to hematuria and need for nephrostomy tube placement  · Urology cleared pt to restart anticoagulation on 5/14 per team, as discussion with them on 5/13  · Pt underwent Exchange of left PCN by IR on 5/14   · Restart anticoagulation in a m  VTE Pharmacologic Prophylaxis:   Pharmacologic: On hold due to hematuria  Mechanical VTE Prophylaxis in Place: Yes    Patient Centered Rounds: I have performed bedside rounds with nursing staff today      Discussions with Specialists or Other Care Team Provider:  Interventional Radiology,     Education and Discussions with Family / Patient:  Discussed plan of care with patient  Discussed plan of care with patient's daughter on phone  Left a voicemail for son  Time Spent for Care:  30 minutes  More than 50% of total time spent on counseling and coordination of care as described above  Current Length of Stay: 6 day(s)    Current Patient Status: Inpatient   Certification Statement: The patient will continue to require additional inpatient hospital stay due to Not medically stable    Discharge Plan:  When medically stable    Code Status: Level 1 - Full Code      Subjective:   Patient had leaking of for left PCN today and it was exchanged by IR  She reports that she had some discomfort around left PCN area yesterday which is better today  No other acute complaints  Objective:     Vitals:   Temp (24hrs), Av 6 °F (36 4 °C), Min:97 3 °F (36 3 °C), Max:97 8 °F (36 6 °C)    Temp:  [97 3 °F (36 3 °C)-97 8 °F (36 6 °C)] 97 3 °F (36 3 °C)  Resp:  [12-16] 12  BP: (124-141)/(67-82) 124/67  Body mass index is 38 53 kg/m²  Input and Output Summary (last 24 hours): Intake/Output Summary (Last 24 hours) at 2021 1644  Last data filed at 2021 1419  Gross per 24 hour   Intake 600 ml   Output 1125 ml   Net -525 ml       Physical Exam:     Physical Exam  Constitutional:       General: She is not in acute distress  Eyes:      Pupils: Pupils are equal, round, and reactive to light  Cardiovascular:      Rate and Rhythm: Normal rate and regular rhythm  Heart sounds: Normal heart sounds  No murmur  Pulmonary:      Effort: No respiratory distress  Breath sounds: No wheezing or rales  Abdominal:      General: Bowel sounds are normal  There is no distension  Palpations: Abdomen is soft  Tenderness: There is no abdominal tenderness  Musculoskeletal:         General: No swelling  Skin:     General: Skin is warm  Neurological:      Mental Status: She is alert        Comments: Awake alert and communicative           Additional Data: Labs:    Results from last 7 days   Lab Units 05/14/21  0511  05/12/21 0443 05/11/21  0441   WBC Thousand/uL 5 66   < > 6 06 8 71   HEMOGLOBIN g/dL 8 1*   < > 7 3* 7 5*   HEMATOCRIT % 27 2*   < > 23 4* 24 7*   PLATELETS Thousands/uL 366   < > 331 353   BANDS PCT %  --   --  4  --    NEUTROS PCT %  --   --   --  86*   LYMPHS PCT %  --   --   --  6*   LYMPHO PCT %  --   --  7*  --    MONOS PCT %  --   --   --  6   MONO PCT %  --   --  4  --    EOS PCT %  --   --  1 0    < > = values in this interval not displayed  Results from last 7 days   Lab Units 05/14/21  0511  05/08/21  1318   SODIUM mmol/L 140   < >  --    POTASSIUM mmol/L 4 6   < >  --    CHLORIDE mmol/L 111*   < >  --    CO2 mmol/L 23   < >  --    BUN mg/dL 56*   < >  --    CREATININE mg/dL 3 46*   < >  --    ANION GAP mmol/L 6   < >  --    CALCIUM mg/dL 8 9   < >  --    ALBUMIN g/dL  --   --  2 9*   TOTAL BILIRUBIN mg/dL  --   --  0 56   ALK PHOS U/L  --   --  85   ALT U/L  --   --  12   AST U/L  --   --  16   GLUCOSE RANDOM mg/dL 94   < >  --     < > = values in this interval not displayed  Results from last 7 days   Lab Units 05/11/21  0441   INR  1 39*                       * I Have Reviewed All Lab Data Listed Above  * Additional Pertinent Lab Tests Reviewed: All Labs Within Last 24 Hours Reviewed    Imaging:    IR nephrostomy tube placement   Final Result by Vinicio Rivera MD (05/11 2143)   Impression:    Ultrasound and fluoroscopically guided placement of a left 8 [place] percutaneous nephrostomy  Bloody purulent material in the collecting system      Renal atrophy with distortion of the left renal collecting system and suspicion for distal ureteral stricture         Workstation performed: [id number]         XR knee 4+ views left injury   Final Result by Enzo Barksdale MD (05/09 9268)      No acute osseous abnormality  Degenerative changes as described              Workstation performed: [id number]         CT abdomen pelvis wo contrast   Final Result by Betsy Maxwell MD (05/08 1332)      New left hydronephrosis with perinephric stranding suggests the possibility of more acute obstruction and/or pyelonephritis  The degree of dilatation on the left is more similar to a study from August 2020  There is also dilated ureter extending to    surgical sutures in the region of the ileal conduit suggesting possible stricture  No obvious stone is seen distally  Urologic evaluation is advised  Nonobstructing stones are seen in the right kidney  Diffuse dilatation of large and small bowel suggest ileus, more likely than obstruction, although the degree of small bowel dilatation is somewhat improved  Follow-up is suggested  Cyst is again noted in the right lower quadrant measuring up to 8 cm in size  This was noted as far back as 2015 although has slightly enlarged  Nonemergent focused ultrasound may be useful  The study was marked in [facility] for immediate notification              Workstation performed: [id number]         IR nephrostomy tube check/change/reposition/reinsertion/upsize    (Results Pending)   IR nephrostomy tube check/change/reposition/reinsertion/upsize    (Results Pending)       Recent Cultures (last 7 days):     Results from last 7 days   Lab Units 05/10/21  1611 05/10/21  1542 05/10/21  1530 05/08/21  1338   GRAM STAIN RESULT   --  1+ Gram positive cocci in pairs*  1+ Gram negative rods*  No polys seen*  --   --    URINE CULTURE  >100,000 cfu/ml Morganella morganii*  >100,000 cfu/ml Enterococcus faecalis*  <10,000 cfu/ml Alpha Hemolytic Streptococcus*  <10,000 cfu/ml Gram Negative Marcel*  --  >100,000 cfu/ml  >100,000 cfu/ml    BODY FLUID CULTURE, STERILE   --  4+ Growth of Enterococcus faecalis*  4+ Growth of Morganella morganii*  2+ Growth of Enterobacter cloacae*  --   --        Last 24 Hours Medication List:   Current Facility-Administered Medications   Medication Dose Route Frequency Provider Last Rate    acetaminophen  650 mg Oral Q6H PRN Belita Smyth, DO      ampicillin  2,000 mg Intravenous Q6H Aaron Mcfarlane MD      atorvastatin  40 mg Oral Daily With Only, [place]      calcitriol  0 25 mcg Oral Every Other Day Belita Smyth, DO      cefTRIAXone  1,000 mg Intravenous Q24H Ashley Ly PA-C 1,000 mg (05/14/21 0834)    cholecalciferol  1,000 Units Oral Daily Belita Smyth, DO      citalopram  20 mg Oral Daily Belita Panchito, DO      docusate sodium  100 mg Oral BID Ashley Ly PA-C      fentaNYL  25 mcg Intravenous Q5 Min PRN Kvng Upton CRNA      iron polysaccharides  150 mg Oral Daily Rey De Guzman MD      levofloxacin  500 mg Intravenous Once Manuel Peters MD      levothyroxine  75 mcg Oral Early Morning Belita Smyth, DO      melatonin  3 mg Oral HS Belita Panchito, DO      metoprolol tartrate  25 mg Oral BID Belita Smyth, DO      ondansetron  4 mg Intravenous Once PRN Kvng Upton CRNA      oxyCODONE  2 5 mg Oral Q4H PRN Roberta Abad PA-C      polyethylene glycol  17 g Oral BID Ashley Ly PA-C      ruxolitinib  10 mg Oral Daily Mary Lerner MD      saccharomyces boulardii  250 mg Oral Daily Belita Smyth, DO      simethicone  80 mg Oral Q6H PRN Ashley Ly PA-C      sodium bicarbonate  1,300 mg Oral BID after meals Rey De Guzman MD          Today, Patient Was Seen By: Aaron Mcfarlane MD    ** Please Note: Dictation voice to text software may have been used in the creation of this document   **

## 2021-05-14 NOTE — ASSESSMENT & PLAN NOTE
Patient presented to ED for gross hematuria (likely due to eliquis)  Has PMHx of CKD Stage 5, obstructive uropathy s/p cystectomy with ilial conduit  · CT abdomin pelvis: New left hydronephrosis with perinephric stranding   · Suggests the possibility of more acute obstruction and/or pyelonephritis  · Urology consulted, input appreciated   · IR following, s/p L nephrostomy tube placement on 5/11 with Dr Ryne Dotson (IR)  · Monitor H/H, transfuse PRN--s/p 1 unit PRBC 5/12  · Urine cx pre-operatively with mixed contaminants x3  · During IR procedure was noted to have purulent urine, started on abx (cultures mixed post op)  · Will complete abx x5 days  · Team discussed with Urology and they were okay with restarting anticoagulation on 05/14 although patient underwent left PCN exchange on 05/14  · Restart anticoagulation on 05/15    · PT/OT input appreciated, recommending home with home therapy, CM to arrange

## 2021-05-14 NOTE — NURSING NOTE
Patient called for assistance, as her perc drainage tube is leaking around the site  Patient's dressing, gown and the pillows supporting her back were all drenched  Flushed to assess if it was leaking because it was kinked  Patient's tube is patent but leaking around site  Reached out to IR, will await response and continue to monitor

## 2021-05-14 NOTE — UTILIZATION REVIEW
Continued Stay Review    Date: 5/14/21                         POD # 4 L Nephrostomy placement    Current Patient Class: IP  Current Level of Care: MS    HPI:75 y o  female initially admitted on 5/8 5/8 with gross hematuria, hydronephrosis L kidney, stage 5 CKD not on HD, osbstructive uropathy with ileal conduit  5/10 had IR Nephrostomy tube placed in L side       Assessment/Plan:   Creat at baseline today  Good BP control  Electrolytes are acceptable today  Last transfusion 5/12 for 1 u PRBCs, H/H stable today  Good glucose control  Yesterday she had a leak of her L PCN  She will have IR tube check later today with possible exchange of tube  It is draining clear urine  No c/o pain  She is back on anticoagulants  Continues on IV antibiotics        Vital Signs:   05/14/21 08:10:29  97 3 °F (36 3 °C)Abnormal   --  12  124/67  86  --  --  --   05/14/21 0500  --  --  --  --  --  --  None (Room air)  --   05/13/21 22:08:25  97 8 °F (36 6 °C)  --  16  138/80  99  --  None (Room air)  Lying   05/13/21 18:23:36  --  --  --  141/82  102  --  --  --   05/13/21 1658  --  --  --  --  --  --  None (Room air)  --   05/13/21 15:29:03  98 °F (36 7 °C)  --  16  150/85  107  --  --  --   05/13/21 07:24:22  98 °F (36 7 °C)  77  16  153/85  108  --  --  --   05/13/21 07:23:57  98 °F (36 7 °C)  --  16  153/85  108  --  --  --   05/12/21 22:02:38  98 1 °F (36 7 °C)  77  17  154/85  108  95 %  --  --   05/12/21 15:28:44  98 5 °F (36 9 °C)  74  18  153/85  108  95 %  --  --   05/12/21 13:00:32  97 9 °F (36 6 °C)  76  16  129/77  94  97 %  --  --   05/12/21 12:51:52  98 °F (36 7 °C)  --  16  132/77  95  --  --  --   05/12/21 1250  98 °F (36 7 °C)  90  16  132/77  --  --  --  --   05/12/21 0813  --  --  --  --  --  --  None (Room air)  --   05/12/21 07:33:11  98 6 °F (37 °C)  --  16  139/76  97  --  --  --     Pertinent Labs/Diagnostic Results:       Results from last 7 days   Lab Units 05/14/21  0511 05/13/21  7167 05/12/21  0443 05/11/21  0441 05/10/21  0511 05/08/21  1219   WBC Thousand/uL 5 66 5 61 6 06 8 71 5 17 5 35   HEMOGLOBIN g/dL 8 1* 8 3* 7 3* 7 5* 8 0* 8 7*   HEMATOCRIT % 27 2* 26 5* 23 4* 24 7* 26 1* 27 8*   PLATELETS Thousands/uL 366 317 331 353 347 395*   NEUTROS ABS Thousands/µL  --   --   --  7 44  --  3 84   BANDS PCT %  --   --  4  --   --   --          Results from last 7 days   Lab Units 05/14/21  0511 05/13/21  0456 05/12/21  0443 05/11/21  0441 05/10/21  0511 05/09/21  0528   SODIUM mmol/L 140 139 136 139 139 137   POTASSIUM mmol/L 4 6 4 3 4 5 4 7 4 5 4 4   CHLORIDE mmol/L 111* 112* 109* 112* 112* 110*   CO2 mmol/L 23 18* 20* 19* 20* 21   ANION GAP mmol/L 6 9 7 8 7 6   BUN mg/dL 56* 55* 44* 38* 44* 43*   CREATININE mg/dL 3 46* 3 70* 3 66* 3 62* 3 52* 3 57*   EGFR ml/min/1 73sq m 12 11 12 12 12 12   CALCIUM mg/dL 8 9 8 7 8 6 8 3 8 2* 8 3   MAGNESIUM mg/dL  --   --  1 9  --   --  2 0   PHOSPHORUS mg/dL  --   --  3 5  --   --  3 4     Results from last 7 days   Lab Units 05/08/21  1318   AST U/L 16   ALT U/L 12   ALK PHOS U/L 85   TOTAL PROTEIN g/dL 6 2*   ALBUMIN g/dL 2 9*   TOTAL BILIRUBIN mg/dL 0 56   BILIRUBIN DIRECT mg/dL 0 17         Results from last 7 days   Lab Units 05/14/21  0511 05/13/21  0456 05/12/21  0443 05/11/21  0441 05/10/21  0511 05/09/21  0528 05/08/21  1219   GLUCOSE RANDOM mg/dL 94 104 95 100 99 91 100     Results from last 7 days   Lab Units 05/08/21  1219   CK TOTAL U/L 91     Results from last 7 days   Lab Units 05/08/21  1318   TROPONIN I ng/mL <0 02         Results from last 7 days   Lab Units 05/11/21  0441 05/10/21  0842 05/08/21  1219   PROTIME seconds 17 0* 15 9* 19 9*   INR  1 39* 1 27* 1 70*   PTT seconds  --   --  34     Results from last 7 days   Lab Units 05/12/21  0443   FERRITIN ng/mL 237     Results from last 7 days   Lab Units 05/08/21  1338   CLARITY UA  Turbid   COLOR UA  Brown   SPEC GRAV UA  1 013   PH UA  Interference-unable to analyze*   GLUCOSE UA mg/dl Interference- unable to analyze   KETONES UA mg/dl Interference- unable to analyze*   BLOOD UA  Interference- unable to analyze*   PROTEIN UA mg/dl Interference- unable to analyze*   NITRITE UA  Interference- unable to analyze   BILIRUBIN UA  Interference- unable to analyze*   UROBILINOGEN UA E U /dl Interference-unable to analyze   LEUKOCYTES UA  Interference- unable to analyze*   WBC UA /hpf Innumerable*   RBC UA /hpf Innumerable*   BACTERIA UA /hpf Innumerable*   EPITHELIAL CELLS WET PREP /hpf Occasional     Results from last 7 days   Lab Units 05/08/21  1318   ACETAMINOPHEN LVL ug/mL <2*   SALICYLATE LVL mg/dL <3*     Results from last 7 days   Lab Units 05/10/21  1611 05/10/21  1542 05/10/21  1530 05/08/21  1338   GRAM STAIN RESULT   --  1+ Gram positive cocci in pairs*  1+ Gram negative rods*  No polys seen*  --   --    URINE CULTURE  >100,000 cfu/ml Morganella morganii*  >100,000 cfu/ml Enterococcus faecalis*  <10,000 cfu/ml Alpha Hemolytic Streptococcus*  <10,000 cfu/ml Gram Negative Marcel*  --  >100,000 cfu/ml  >100,000 cfu/ml    BODY FLUID CULTURE, STERILE   --  4+ Growth of Enterococcus faecalis*  4+ Growth of Morganella morganii*  --   --      Medications:   Scheduled Medications:  atorvastatin, 40 mg, Oral, Daily With Dinner  calcitriol, 0 25 mcg, Oral, Every Other Day  cefTRIAXone, 1,000 mg, Intravenous, Q24H  cholecalciferol, 1,000 Units, Oral, Daily  citalopram, 20 mg, Oral, Daily  docusate sodium, 100 mg, Oral, BID  iron polysaccharides, 150 mg, Oral, Daily  levofloxacin, 500 mg, Intravenous, Once  levothyroxine, 75 mcg, Oral, Early Morning  melatonin, 3 mg, Oral, HS  metoprolol tartrate, 25 mg, Oral, BID  polyethylene glycol, 17 g, Oral, BID  ruxolitinib, 10 mg, Oral, Daily  saccharomyces boulardii, 250 mg, Oral, Daily  sodium bicarbonate, 1,300 mg, Oral, BID after meals      Continuous IV Infusions:     PRN Meds:  acetaminophen, 650 mg, Oral, Q6H PRN  fentaNYL, 25 mcg, Intravenous, Q5 Min PRN  ondansetron, 4 mg, Intravenous, Once PRN  oxyCODONE, 2 5 mg, Oral, Q4H PRN - x 1 5/13  simethicone, 80 mg, Oral, Q6H PRN    Discharge Plan: D    Network Utilization Review Department  ATTENTION: Please call with any questions or concerns to [phone] and carefully listen to the prompts so that you are directed to the right person  All voicemails are confidential   Chante Cole all requests for admission clinical reviews, approved or denied determinations and any other requests to dedicated fax number below belonging to the campus where the patient is receiving treatment   List of dedicated fax numbers for the Facilities:  [address] DENIALS (Administrative/Medical Necessity) [phone]   [address] (Maternity/NICU/Pediatrics) [phone]   [address] [place] [address] [id number] [address] [facility] [place] [address] [phone]

## 2021-05-15 ENCOUNTER — APPOINTMENT (INPATIENT)
Dept: RADIOLOGY | Facility: HOSPITAL | Age: 76
DRG: 690 | End: 2021-05-15
Payer: COMMERCIAL

## 2021-05-15 PROBLEM — N11.1 OBSTRUCTIVE PYELONEPHRITIS: Status: ACTIVE | Noted: 2021-05-14

## 2021-05-15 LAB
ANION GAP SERPL CALCULATED.3IONS-SCNC: 6 MMOL/L (ref 4–13)
BASOPHILS # BLD MANUAL: 0.14 THOUSAND/UL (ref 0–0.1)
BASOPHILS NFR MAR MANUAL: 2 % (ref 0–1)
BUN SERPL-MCNC: 55 MG/DL (ref 5–25)
CALCIUM SERPL-MCNC: 8.5 MG/DL (ref 8.3–10.1)
CHLORIDE SERPL-SCNC: 110 MMOL/L (ref 100–108)
CO2 SERPL-SCNC: 24 MMOL/L (ref 21–32)
CREAT SERPL-MCNC: 3.38 MG/DL (ref 0.6–1.3)
EOSINOPHIL # BLD MANUAL: 0.14 THOUSAND/UL (ref 0–0.4)
EOSINOPHIL NFR BLD MANUAL: 2 % (ref 0–6)
ERYTHROCYTE [DISTWIDTH] IN BLOOD BY AUTOMATED COUNT: 14.4 % (ref 11.6–15.1)
GFR SERPL CREATININE-BSD FRML MDRD: 13 ML/MIN/1.73SQ M
GLUCOSE SERPL-MCNC: 97 MG/DL (ref 65–140)
HCT VFR BLD AUTO: 25.6 % (ref 34.8–46.1)
HGB BLD-MCNC: 8 G/DL (ref 11.5–15.4)
LYMPHOCYTES # BLD AUTO: 1.08 THOUSAND/UL (ref 0.6–4.47)
LYMPHOCYTES # BLD AUTO: 15 % (ref 14–44)
MCH RBC QN AUTO: 29.2 PG (ref 26.8–34.3)
MCHC RBC AUTO-ENTMCNC: 31.3 G/DL (ref 31.4–37.4)
MCV RBC AUTO: 93 FL (ref 82–98)
METAMYELOCYTES NFR BLD MANUAL: 2 % (ref 0–1)
MONOCYTES # BLD AUTO: 0.58 THOUSAND/UL (ref 0–1.22)
MONOCYTES NFR BLD: 8 % (ref 4–12)
MYELOCYTES NFR BLD MANUAL: 2 % (ref 0–1)
NEUTROPHILS # BLD MANUAL: 4.97 THOUSAND/UL (ref 1.85–7.62)
NEUTS BAND NFR BLD MANUAL: 2 % (ref 0–8)
NEUTS SEG NFR BLD AUTO: 67 % (ref 43–75)
NRBC BLD AUTO-RTO: 0 /100 WBCS
PLATELET # BLD AUTO: 345 THOUSANDS/UL (ref 149–390)
PLATELET BLD QL SMEAR: ADEQUATE
PMV BLD AUTO: 9.8 FL (ref 8.9–12.7)
POTASSIUM SERPL-SCNC: 4.3 MMOL/L (ref 3.5–5.3)
RBC # BLD AUTO: 2.74 MILLION/UL (ref 3.81–5.12)
RBC MORPH BLD: NORMAL
SODIUM SERPL-SCNC: 140 MMOL/L (ref 136–145)
TOTAL CELLS COUNTED SPEC: 100
WBC # BLD AUTO: 7.21 THOUSAND/UL (ref 4.31–10.16)

## 2021-05-15 PROCEDURE — 80048 BASIC METABOLIC PNL TOTAL CA: CPT | Performed by: INTERNAL MEDICINE

## 2021-05-15 PROCEDURE — C1769 GUIDE WIRE: HCPCS

## 2021-05-15 PROCEDURE — 99232 SBSQ HOSP IP/OBS MODERATE 35: CPT | Performed by: INTERNAL MEDICINE

## 2021-05-15 PROCEDURE — C1729 CATH, DRAINAGE: HCPCS

## 2021-05-15 PROCEDURE — 50434 CONVERT NEPHROSTOMY CATHETER: CPT | Performed by: RADIOLOGY

## 2021-05-15 PROCEDURE — 0T25X0Z CHANGE DRAINAGE DEVICE IN KIDNEY, EXTERNAL APPROACH: ICD-10-PCS | Performed by: RADIOLOGY

## 2021-05-15 PROCEDURE — 85007 BL SMEAR W/DIFF WBC COUNT: CPT | Performed by: INTERNAL MEDICINE

## 2021-05-15 PROCEDURE — 85027 COMPLETE CBC AUTOMATED: CPT | Performed by: INTERNAL MEDICINE

## 2021-05-15 PROCEDURE — NC001 PR NO CHARGE: Performed by: RADIOLOGY

## 2021-05-15 PROCEDURE — 99222 1ST HOSP IP/OBS MODERATE 55: CPT | Performed by: INTERNAL MEDICINE

## 2021-05-15 PROCEDURE — 50434 CONVERT NEPHROSTOMY CATHETER: CPT

## 2021-05-15 RX ORDER — AMOXICILLIN 500 MG/1
500 CAPSULE ORAL EVERY 12 HOURS SCHEDULED
Status: DISCONTINUED | OUTPATIENT
Start: 2021-05-15 | End: 2021-05-16 | Stop reason: HOSPADM

## 2021-05-15 RX ORDER — IRON POLYSACCHARIDE COMPLEX 150 MG
150 CAPSULE ORAL DAILY
Status: DISCONTINUED | OUTPATIENT
Start: 2021-05-16 | End: 2021-05-16 | Stop reason: HOSPADM

## 2021-05-15 RX ORDER — ASPIRIN 81 MG/1
81 TABLET ORAL DAILY
Status: DISCONTINUED | OUTPATIENT
Start: 2021-05-15 | End: 2021-05-16 | Stop reason: HOSPADM

## 2021-05-15 RX ORDER — FENTANYL CITRATE 50 UG/ML
INJECTION, SOLUTION INTRAMUSCULAR; INTRAVENOUS CODE/TRAUMA/SEDATION MEDICATION
Status: COMPLETED | OUTPATIENT
Start: 2021-05-15 | End: 2021-05-15

## 2021-05-15 RX ORDER — LIDOCAINE WITH 8.4% SOD BICARB 0.9%(10ML)
SYRINGE (ML) INJECTION CODE/TRAUMA/SEDATION MEDICATION
Status: COMPLETED | OUTPATIENT
Start: 2021-05-15 | End: 2021-05-15

## 2021-05-15 RX ORDER — CIPROFLOXACIN 500 MG/1
500 TABLET, FILM COATED ORAL EVERY 24 HOURS
Status: DISCONTINUED | OUTPATIENT
Start: 2021-05-16 | End: 2021-05-16 | Stop reason: HOSPADM

## 2021-05-15 RX ADMIN — CITALOPRAM HYDROBROMIDE 20 MG: 20 TABLET ORAL at 08:51

## 2021-05-15 RX ADMIN — Medication 150 MG: at 08:51

## 2021-05-15 RX ADMIN — CEFTRIAXONE 1000 MG: 2 INJECTION, POWDER, FOR SOLUTION INTRAMUSCULAR; INTRAVENOUS at 08:55

## 2021-05-15 RX ADMIN — RUXOLITINIB 10 MG: 10 TABLET ORAL at 08:51

## 2021-05-15 RX ADMIN — APIXABAN 2.5 MG: 2.5 TABLET, FILM COATED ORAL at 10:37

## 2021-05-15 RX ADMIN — METOPROLOL TARTRATE 25 MG: 25 TABLET, FILM COATED ORAL at 17:12

## 2021-05-15 RX ADMIN — AMPICILLIN SODIUM 2000 MG: 2 INJECTION, POWDER, FOR SOLUTION INTRAMUSCULAR; INTRAVENOUS at 00:12

## 2021-05-15 RX ADMIN — DOCUSATE SODIUM 100 MG: 100 CAPSULE ORAL at 08:50

## 2021-05-15 RX ADMIN — AMOXICILLIN 500 MG: 500 CAPSULE ORAL at 21:37

## 2021-05-15 RX ADMIN — AMPICILLIN SODIUM 2000 MG: 2 INJECTION, POWDER, FOR SOLUTION INTRAMUSCULAR; INTRAVENOUS at 05:45

## 2021-05-15 RX ADMIN — Medication 250 MG: at 08:51

## 2021-05-15 RX ADMIN — SODIUM BICARBONATE 650 MG TABLET 1300 MG: at 17:12

## 2021-05-15 RX ADMIN — FENTANYL CITRATE 50 MCG: 50 INJECTION INTRAMUSCULAR; INTRAVENOUS at 14:51

## 2021-05-15 RX ADMIN — IOHEXOL 10 ML: 350 INJECTION, SOLUTION INTRAVENOUS at 15:19

## 2021-05-15 RX ADMIN — APIXABAN 2.5 MG: 2.5 TABLET, FILM COATED ORAL at 17:12

## 2021-05-15 RX ADMIN — LEVOTHYROXINE SODIUM 75 MCG: 75 TABLET ORAL at 05:45

## 2021-05-15 RX ADMIN — ATORVASTATIN CALCIUM 40 MG: 40 TABLET, FILM COATED ORAL at 17:12

## 2021-05-15 RX ADMIN — POLYETHYLENE GLYCOL 3350 17 G: 17 POWDER, FOR SOLUTION ORAL at 08:50

## 2021-05-15 RX ADMIN — AMOXICILLIN 500 MG: 500 CAPSULE ORAL at 11:41

## 2021-05-15 RX ADMIN — Medication 10 ML: at 14:58

## 2021-05-15 RX ADMIN — DOCUSATE SODIUM 100 MG: 100 CAPSULE ORAL at 17:12

## 2021-05-15 RX ADMIN — METOPROLOL TARTRATE 25 MG: 25 TABLET, FILM COATED ORAL at 08:51

## 2021-05-15 RX ADMIN — MELATONIN TAB 3 MG 3 MG: 3 TAB at 21:37

## 2021-05-15 RX ADMIN — Medication 1000 UNITS: at 08:51

## 2021-05-15 RX ADMIN — ASPIRIN 81 MG: 81 TABLET, COATED ORAL at 10:37

## 2021-05-15 RX ADMIN — SODIUM BICARBONATE 650 MG TABLET 1300 MG: at 08:51

## 2021-05-15 NOTE — CONSULTS
INTERPROFESSIONAL (PHONE) [address] [place] 77 y o  female MRN: [id number]  Unit/Bed#: [facility] 803-01 Encounter: [id number]    IR has been consulted to evaluate the patient, determine the appropriate procedure, and whether or not a procedure can and should be performed regarding the care of Natasha Rucker  We were consulted by Hospitalist concerning continued leakage around left PCN, and to possibly perform a left PCN check if medically appropriate for the patient  Consults  05/15/21      Assessment/Recommendation:   71-year-old female with history cystectomy with ileal conduit with chronic hydronephrosis, status post left PCN placement  Patient has had leakage from the left PCN insertion site  The left PCN was exchanged yesterday, however, continues to have leakage     - plan for conversion of PCN to PCNU to minimize leakage        Total time spent in review of data, discussion with requesting provider and rendering advice was 15 min  Thank you for allowing Interventional Radiology to participate in the care of Natasha Rucker  Please don't hesitate to call or TigerText us with any questions       Nate Oneal MD

## 2021-05-15 NOTE — ASSESSMENT & PLAN NOTE
Patient presented to ED for gross hematuria (likely due to eliquis)  Has PMHx of CKD Stage 5, obstructive uropathy s/p cystectomy with ilial conduit  · CT abdomin pelvis: New left hydronephrosis with perinephric stranding   · Suggests the possibility of more acute obstruction and/or pyelonephritis  · IR following, s/p L nephrostomy tube placement on 5/11 with Dr Rosalie Freire (IR)  · Monitor H/H, transfuse PRN--s/p 1 unit PRBC 5/12  · Polymicrobial urine culture from procedures growing Morganella, Enterococcus, Enterobacter  · Team discussed with Urology and they were okay with restarting anticoagulation on 05/14 although patient underwent left PCN exchange on 05/14  · Again with leaking PCn on 5/14>>IR re consulted  · Restarted anticoagulation on 05/15  · PT/OT input appreciated, recommending home with home therapy, CM to arrange   · Outpatient urology follow-up

## 2021-05-15 NOTE — PROGRESS NOTES
NEPHROLOGY PROGRESS NOTE   Pablo Carr 77 y o  female MRN: [id number]  Unit/Bed#: [facility] 803-01 Encounter: [id number]  Reason for Consult: LUANN    ASSESSMENT AND PLAN:  69-year-old female with history cystectomy ileal conduit and chronic hydronephrosis with advanced chronic kidney disease stage 5 followed by Dr Samantha Ortiz:  She now presents with gross hematuria, CT scan demonstrated left hydronephrosis and perinephric stranding and more acute obstruction possible pyelonephritis   Status post antibiotics and left PCN placed 5/10    CKD stage 5, baseline creatinine 3 5 to 3 6, follows with Dr Samantha Ortiz  -suspected secondary to obstructive uropathy in the setting of functional solitary kidney, multiple episodes of prior LUANN causing residual damage  -creatinine 3 3 overall improving and stable at baseline   -avoid nephrotoxins or NSAIDs    Hypertension, blood pressure overall acceptable    CKD BMD, continue to monitor phosphorus, PTH  On calcitriol 3 times a week  CKD anemia, not on Epogen due to history of PE, transfuse p r n  For hemoglobin less than seven  On p o  Oral iron supplement  Status post left PCN for obstructive uropathy, has been having some leaking around PCN site, may need IR evaluation  Discussed above plan in detail with primary team   Overall stable from renal standpoint  SUBJECTIVE:  Patient seen and examined at bedside  No chest pain, shortness of breath, nausea, vomiting, abdominal pain       OBJECTIVE:  Current Weight: Weight - Scale: 89 5 kg (197 lb 4 8 oz)  Vitals:    05/15/21 0738   BP: 154/86   Pulse:    Resp:    Temp: 98 5 °F (36 9 °C)   SpO2:        Intake/Output Summary (Last 24 hours) at 5/15/2021 1219  Last data filed at 5/15/2021 0830  Gross per 24 hour   Intake 780 ml   Output 1350 ml   Net -570 ml     Wt Readings from Last 3 Encounters:   05/08/21 89 5 kg (197 lb 4 8 oz)   04/23/21 88 9 kg (196 lb)   04/19/21 88 kg (194 lb)     Temp Readings from Last 3 Encounters: 05/15/21 98 5 °F (36 9 °C)   04/26/21 (!) 97 2 °F (36 2 °C) (Temporal)   04/23/21 (!) 97 °F (36 1 °C) (Temporal)     BP Readings from Last 3 Encounters:   05/15/21 154/86   04/26/21 158/80   04/23/21 140/80     Pulse Readings from Last 3 Encounters:   05/14/21 77   04/26/21 63   04/23/21 65        Physical Examination:  General:  Sitting in chair, no acute distress   Eyes:  Mild conjunctival pallor present  ENT:  External examination of ears and nose unremarkable  Neck:  No obvious lymphadenopathy appreciated  Respiratory:  Bilateral air entry present  CVS:  S1, S2 present  GI: , nondistended  CNS:  Active alert oriented x3  Skin:  No new rash in legs  Musculoskeletal:  No Obvious new gross deformity noted    Medications:    Current Facility-Administered Medications:     acetaminophen (TYLENOL) tablet 650 mg, 650 mg, Oral, Q6H PRN, Colan Deepti, DO    amoxicillin (AMOXIL) capsule 500 mg, 500 mg, Oral, Q12H [address], Omkar Flower MD, 500 mg at 05/15/21 1141    apixaban (ELIQUIS) tablet 2 5 mg, 2 5 mg, Oral, BID, Oleg Patel MD, 2 5 mg at 05/15/21 1037    aspirin (ECOTRIN LOW STRENGTH) EC tablet 81 mg, 81 mg, Oral, Daily, Oleg Patel MD, 81 mg at 05/15/21 1037    atorvastatin (LIPITOR) tablet 40 mg, 40 mg, Oral, Daily With Dinner, Colesperanza Deepti, DO, 40 mg at 05/14/21 1651    calcitriol (ROCALTROL) capsule 0 25 mcg, 0 25 mcg, Oral, Every Other Day, Colan Deepti, DO, 0 25 mcg at 05/14/21 0827    cholecalciferol (VITAMIN D3) tablet 1,000 Units, 1,000 Units, Oral, Daily, Colan Deepti, DO, 1,000 Units at 05/15/21 0851    [START ON 5/16/2021] ciprofloxacin (CIPRO) tablet 500 mg, 500 mg, Oral, Q24H, Omkar Flower MD    citalopram (CeleXA) tablet 20 mg, 20 mg, Oral, Daily, Colan Deepti, DO, 20 mg at 05/15/21 0851    docusate sodium (COLACE) capsule 100 mg, 100 mg, Oral, BID, Beverley Murcia PA-C, 100 mg at 05/15/21 0850    fentaNYL (SUBLIMAZE) injection 25 mcg, 25 mcg, Intravenous, Q5 Min PRN, Unique Dick Heydt, CRNA    [START ON 5/16/2021] iron polysaccharides (FERREX) capsule 150 mg, 150 mg, Oral, Daily, Della Morris MD    levothyroxine tablet 75 mcg, 75 mcg, Oral, Early Morning, Artie Johnson, DO, 75 mcg at 05/15/21 0545    melatonin tablet 3 mg, 3 mg, Oral, HS, Artie Johnson, DO, 3 mg at 05/14/21 2119    metoprolol tartrate (LOPRESSOR) tablet 25 mg, 25 mg, Oral, BID, Artie Johnson, DO, 25 mg at 05/15/21 0851    ondansetron (ZOFRAN) injection 4 mg, 4 mg, Intravenous, Once PRN, Carol Delatorre CRNA    oxyCODONE (ROXICODONE) IR tablet 2 5 mg, 2 5 mg, Oral, Q4H PRN, Roberta Abad PA-C, 2 5 mg at 05/14/21 1651    polyethylene glycol (MIRALAX) packet 17 g, 17 g, Oral, BID, Beverley Murcia PA-C, 17 g at 05/15/21 0850    ruxolitinib (JAKAFI) tablet 10 mg, 10 mg, Oral, Daily, Mario Muro MD, 10 mg at 05/15/21 0034    saccharomyces boulardii (FLORASTOR) capsule 250 mg, 250 mg, Oral, Daily, Artie Johnson, DO, 250 mg at 05/15/21 0851    simethicone (MYLICON) chewable tablet 80 mg, 80 mg, Oral, Q6H PRN, Jer Clemens PA-C, 80 mg at 05/11/21 1305    sodium bicarbonate tablet 1,300 mg, 1,300 mg, Oral, BID after meals, Roger Perry MD, 1,300 mg at 05/15/21 0851    Laboratory Results:  Results from last 7 days   Lab Units 05/15/21  0444 05/14/21  0511 05/13/21  0627 05/13/21  0456 05/12/21  0443 05/11/21  0441 05/10/21  0511 05/09/21  0528   WBC Thousand/uL 7 21 5 66 5 61  --  6 06 8 71 5 17 4 70   HEMOGLOBIN g/dL 8 0* 8 1* 8 3*  --  7 3* 7 5* 8 0* 8 0*   HEMATOCRIT % 25 6* 27 2* 26 5*  --  23 4* 24 7* 26 1* 25 8*   PLATELETS Thousands/uL 345 366 317  --  331 353 347 344   SODIUM mmol/L 140 140  --  139 136 139 139 137   POTASSIUM mmol/L 4 3 4 6  --  4 3 4 5 4 7 4 5 4 4   CHLORIDE mmol/L 110* 111*  --  112* 109* 112* 112* 110*   CO2 mmol/L 24 23  --  18* 20* 19* 20* 21   BUN mg/dL 55* 56*  --  55* 44* 38* 44* 43*   CREATININE mg/dL 3 38* 3 46*  --  3 70* 3 66* 3 62* 3 52* 3 57*   CALCIUM mg/dL 8 5 8 9  --  8 7 8 6 8 3 8 2* 8 3   MAGNESIUM mg/dL  --   --   --   --  1 9  --   --  2 0   PHOSPHORUS mg/dL  --   --   --   --  3 5  --   --  3 4       IR nephrostomy tube check/change/reposition/reinsertion/upsize   Final Result by Natalie Smallwood MD (05/14 6752)      1  The existing left PCN appears mildly retracted  2  The left PCN was exchanged for a new catheter using 8 Estonian catheter  Plan:       Return in 2 months for routine left PCN exchange as previously scheduled  _______________________________________________________________      PROCEDURE SUMMARY   - Antegrade nephrostogram(s) via the existing access    - Left PCN exchange       PROCEDURE DETAILS:      Pre-procedure   Consent: Existing informed consent was utilized and time-out was performed prior to the procedure  Preparation: The site was prepared and draped using maximal sterile barrier technique including cutaneous antisepsis  Left genitourinary catheter exchange   Initial nephrostogram was performed  The existing catheter was removed over a Glidewire  A new 8 Estonian catheter was advanced over the wire with pigtail loop forming within the renal collecting system  Contrast was injected through the new catheter under    fluoroscopy to confirm proper positioning  Catheter was secured to skin using 2-0 Prolene suture and connected to gravity drainage  Contrast   Contrast agent: Omnipaque 300   Contrast volume (mL): 10      Radiation Dose   Fluoroscopy time (minutes): 3 6     Reference air kerma (mGy): 109       Additional Details   Specimens removed: Existing left PCN catheter   Estimated blood loss (mL): None   Standardized report: [email or web address]_[email or web address]      Attestation   Signer name: [facility]   I attest that I was present for the entire procedure  I reviewed the stored images and agree with the report as written        Workstation performed: [id number]         IR nephrostomy tube placement   Final Result by Maci Hicks MD (05/11 9273) Impression:    Ultrasound and fluoroscopically guided placement of a left 8 Wolof percutaneous nephrostomy  Bloody purulent material in the collecting system      Renal atrophy with distortion of the left renal collecting system and suspicion for distal ureteral stricture         Workstation performed: [id number]         XR knee 4+ views left injury   Final Result by Liz Faith MD (05/09 9628)      No acute osseous abnormality  Degenerative changes as described  Workstation performed: [id number]         CT abdomen pelvis wo contrast   Final Result by Tian Bernstein MD (05/08 1332)      New left hydronephrosis with perinephric stranding suggests the possibility of more acute obstruction and/or pyelonephritis  The degree of dilatation on the left is more similar to a study from August 2020  There is also dilated ureter extending to    surgical sutures in the region of the ileal conduit suggesting possible stricture  No obvious stone is seen distally  Urologic evaluation is advised  Nonobstructing stones are seen in the right kidney  Diffuse dilatation of large and small bowel suggest ileus, more likely than obstruction, although the degree of small bowel dilatation is somewhat improved  Follow-up is suggested  Cyst is again noted in the right lower quadrant measuring up to 8 cm in size  This was noted as far back as 2015 although has slightly enlarged  Nonemergent focused ultrasound may be useful  The study was marked in [facility] for immediate notification  Workstation performed: [id number]         IR nephrostomy tube check/change/reposition/reinsertion/upsize    (Results Pending)       Portions of the record may have been created with voice recognition software  Occasional wrong word or "sound a like" substitutions may have occurred due to the inherent limitations of voice recognition software   Read the chart carefully and recognize, using context, where substitutions have occurred

## 2021-05-15 NOTE — PROGRESS NOTES
[address] [facility]  Progress Note - Jose Parson [birth year], 77 y o  female MRN: [id number]  Unit/Bed#: [facility] 803-01 Encounter: [id number]  Primary Care Provider: Chris Cason MD   Date and time admitted to hospital: 5/8/2021 11:18 AM    * Gross hematuria  Assessment & Plan  Patient presented to ED for gross hematuria (likely due to eliquis)  Has PMHx of CKD Stage 5, obstructive uropathy s/p cystectomy with ilial conduit  · CT abdomin pelvis: New left hydronephrosis with perinephric stranding   · Suggests the possibility of more acute obstruction and/or pyelonephritis  · IR following, s/p L nephrostomy tube placement on 5/11 with Dr Randal López (IR)  · Monitor H/H, transfuse PRN--s/p 1 unit PRBC 5/12  · Polymicrobial urine culture from procedures growing Morganella, Enterococcus, Enterobacter  · Team discussed with Urology and they were okay with restarting anticoagulation on 05/14 although patient underwent left PCN exchange on 05/14  · Again with leaking PCn on 5/14>>IR re consulted  · Restarted anticoagulation on 05/15  · PT/OT input appreciated, recommending home with home therapy, CM to arrange   · Outpatient urology follow-up  Obstructive left pyelonephritis  Assessment & Plan  · Urine cultures and body fluid culture from IR procedure on 05/10 are growing ampicillin susceptible Enterococcus, ceftriaxone susceptible Morganella and Enterobacter cloacae  · Patient has known history of cystectomy and ileal conduit  · Antibiotics switched from ceftriaxone to Cipro for 2 more days following Gram-negative coverage and from ampicillin to amoxicillin for Enterococcus coverage for 8 more days by ID  Appreciate input      Anemia  Assessment & Plan  Anemia noted--likely 2/2 hematuria coupled with anemia of CKD at baseline (baseline around 9-10)  · Trend CBC, s/p unit of PRBC on 5/12 with improvement of Hgb from 7 3-->8 3  · No JENELLE due to PE hx  · Will avoid IV iron as well given ?infection  · Oral iron started     Hydronephrosis of left kidney  Assessment & Plan  IR and urology consulted  · S/P Perc neph tube on 5/11 with IR   · Flush with 10 mL NSS BID  · F/u IR q3 months for routine tube exchanges  · Will need ongoing nephrostomy tube drainage for at least several weeks and then re-eval with antegrade study    Hyperlipemia  Assessment & Plan  · Continue statin    Stage 5 chronic kidney disease not on chronic dialysis [facility])  Assessment & Plan  Lab Results   Component Value Date    EGFR 13 05/15/2021    EGFR 12 05/14/2021    EGFR 11 05/13/2021    CREATININE 3 38 (H) 05/15/2021    CREATININE 3 46 (H) 05/14/2021    CREATININE 3 70 (H) 05/13/2021   Estimated Creatinine Clearance: 14 5 mL/min (A) (by C-G formula based on SCr of 3 38 mg/dL (H))  Cr near baseline 3 4-3 5, follows with Dr Davion Valdovinos  · No significant improvement s/p nephrostomy tube  · Renal input appreciated   · Monitor BMP     Polycythemia vera [facility])  Assessment & Plan  Patient follows up with outpatient Hematology Oncology, currently on Jakafi 10 mg Oral Daily  · Brought medication in from home   · Monitor labs     Obstructive uropathy  Assessment & Plan  S/p ileostomy for nonfunctioning bladder and chronic hydro  · OP urology follow up    Chronic saddle pulmonary embolism ([address] )  Assessment & Plan  Many years ago per patient  · Eliquis held due to hematuria and need for nephrostomy tube placement  · Restarted anticoagulation on 5/15        VTE Pharmacologic Prophylaxis:   Pharmacologic: Apixaban (Eliquis)  Mechanical VTE Prophylaxis in Place: Yes    Patient Centered Rounds: I have performed bedside rounds with nursing staff today  Discussions with Specialists or Other Care Team Provider: nephrology, ID    Education and Discussions with Family / Patient: plan of care, patient  Left a Vm for patient' s son  Time Spent for Care: 30 minutes    More than 50% of total time spent on counseling and coordination of care as described above     Current Length of Stay: 7 day(s)    Current Patient Status: Inpatient   Certification Statement: The patient will continue to require additional inpatient hospital stay due to not medically stable    Discharge Plan: likely dc home tomorrow    Code Status: Level 1 - Full Code      Subjective:   No overnight events  Again with leaking left PCN today  Mild discomfort around PCn site  No fever or chills  Objective:     Vitals:   Temp (24hrs), Av 2 °F (36 8 °C), Min:97 8 °F (36 6 °C), Max:98 5 °F (36 9 °C)    Temp:  [97 8 °F (36 6 °C)-98 5 °F (36 9 °C)] 98 5 °F (36 9 °C)  HR:  [65-78] 67  Resp:  [17-20] 18  BP: (116-166)/(64-94) 166/70  SpO2:  [94 %-99 %] 96 %  Body mass index is 38 53 kg/m²  Input and Output Summary (last 24 hours): Intake/Output Summary (Last 24 hours) at 5/15/2021 1536  Last data filed at 5/15/2021 1230  Gross per 24 hour   Intake 1020 ml   Output 1300 ml   Net -280 ml       Physical Exam:     Physical Exam  Constitutional:       General: She is not in acute distress  Eyes:      Pupils: Pupils are equal, round, and reactive to light  Cardiovascular:      Rate and Rhythm: Normal rate and regular rhythm  Heart sounds: Normal heart sounds  No murmur  Pulmonary:      Effort: No respiratory distress  Breath sounds: No wheezing or rales  Abdominal:      General: Bowel sounds are normal  There is no distension  Palpations: Abdomen is soft  Tenderness: There is no abdominal tenderness  Musculoskeletal:         General: No swelling  Skin:     General: Skin is warm  Neurological:      Mental Status: She is alert        Comments: Awake alert and communicative         Additional Data:     Labs:    Results from last 7 days   Lab Units 05/15/21  0444  21  0441   WBC Thousand/uL 7 21   < > 8 71   HEMOGLOBIN g/dL 8 0*   < > 7 5*   HEMATOCRIT % 25 6*   < > 24 7*   PLATELETS Thousands/uL 345   < > 353   BANDS PCT % 2   < >  --    NEUTROS PCT %  --   -- 86*   LYMPHS PCT %  --   --  6*   LYMPHO PCT % 15   < >  --    MONOS PCT %  --   --  6   MONO PCT % 8   < >  --    EOS PCT % 2   < > 0    < > = values in this interval not displayed  Results from last 7 days   Lab Units 05/15/21  0444   SODIUM mmol/L 140   POTASSIUM mmol/L 4 3   CHLORIDE mmol/L 110*   CO2 mmol/L 24   BUN mg/dL 55*   CREATININE mg/dL 3 38*   ANION GAP mmol/L 6   CALCIUM mg/dL 8 5   GLUCOSE RANDOM mg/dL 97     Results from last 7 days   Lab Units 05/11/21  0441   INR  1 39*                       * I Have Reviewed All Lab Data Listed Above  * Additional Pertinent Lab Tests Reviewed: All Labs Within Last 24 Hours Reviewed    Imaging:    IR nephrostomy to nephroureteral stent   Final Result by Claudean Alderman, MD (05/15 1524)      1  The left PCN was mildly retracted  2  Successful conversion of left PCN to PCNU using 8 5 Hebrew 22 cm catheter  Plan:       Consider converting the left PCNU catheter into retrograde nephroureteral stent during next catheter exchange    _______________________________________________________________      PROCEDURE SUMMARY   - Antegrade nephrostogram(s) via the existing access    - Left PCN to PCNU catheter conversion       PROCEDURE DETAILS:      Pre-procedure   Consent: Existing informed consent was utilized and time-out was performed prior to the procedure  Preparation: The site was prepared and draped using maximal sterile barrier technique including cutaneous antisepsis  Left PCN to PCNU catheter conversion   Local anesthesia was administered  Initial nephrostogram was performed  The existing PCN was noted to be mildly retracted into a calyx  The existing PCN was exchanged for an 8 5 Hebrew 22 cm PCNU catheter over a stiff Amplatz wire  Contrast was injected    through the new catheter to confirm proper positioning        Contrast   Contrast agent: Omnipaque 350   Contrast volume (mL): 10      Radiation Dose   Fluoroscopy time (minutes): 5 2     Reference air kerma (mGy): 40 123    Kerma area product (Gy-cm2): 4 3       Additional Details   Specimens removed: Existing PCN catheter   Estimated blood loss (mL): None   Standardized report: [email or web address]      Attestation   Signer name: [facility]   I attest that I was present for the entire procedure  I reviewed the stored images and agree with the report as written  Workstation performed: [id number]         IR nephrostomy tube check/change/reposition/reinsertion/upsize   Final Result by Joy Farnsworth MD (05/14 1702)      1  The existing left PCN appears mildly retracted  2  The left PCN was exchanged for a new catheter using 8 Cymro catheter  Plan:       Return in 2 months for routine left PCN exchange as previously scheduled  _______________________________________________________________      PROCEDURE SUMMARY   - Antegrade nephrostogram(s) via the existing access    - Left PCN exchange       PROCEDURE DETAILS:      Pre-procedure   Consent: Existing informed consent was utilized and time-out was performed prior to the procedure  Preparation: The site was prepared and draped using maximal sterile barrier technique including cutaneous antisepsis  Left genitourinary catheter exchange   Initial nephrostogram was performed  The existing catheter was removed over a Glidewire  A new 8 Cymro catheter was advanced over the wire with pigtail loop forming within the renal collecting system  Contrast was injected through the new catheter under    fluoroscopy to confirm proper positioning  Catheter was secured to skin using 2-0 Prolene suture and connected to gravity drainage        Contrast   Contrast agent: Omnipaque 300   Contrast volume (mL): 10      Radiation Dose   Fluoroscopy time (minutes): 3 6     Reference air kerma (mGy): 109       Additional Details   Specimens removed: Existing left PCN catheter   Estimated blood loss (mL): None   Standardized report: [email or web address]_v3 Attestation   Signer name: [facility]   I attest that I was present for the entire procedure  I reviewed the stored images and agree with the report as written  Workstation performed: [id number]         IR nephrostomy tube placement   Final Result by Vinay Sampson MD (05/11 2143)   Impression:    Ultrasound and fluoroscopically guided placement of a left 8 Kosovan percutaneous nephrostomy  Bloody purulent material in the collecting system      Renal atrophy with distortion of the left renal collecting system and suspicion for distal ureteral stricture         Workstation performed: [id number]         XR knee 4+ views left injury   Final Result by Carlos Ron MD (05/09 4902)      No acute osseous abnormality  Degenerative changes as described  Workstation performed: [id number]         CT abdomen pelvis wo contrast   Final Result by Lizandro Gomes MD (05/08 5252)      New left hydronephrosis with perinephric stranding suggests the possibility of more acute obstruction and/or pyelonephritis  The degree of dilatation on the left is more similar to a study from August 2020  There is also dilated ureter extending to    surgical sutures in the region of the ileal conduit suggesting possible stricture  No obvious stone is seen distally  Urologic evaluation is advised  Nonobstructing stones are seen in the right kidney  Diffuse dilatation of large and small bowel suggest ileus, more likely than obstruction, although the degree of small bowel dilatation is somewhat improved  Follow-up is suggested  Cyst is again noted in the right lower quadrant measuring up to 8 cm in size  This was noted as far back as 2015 although has slightly enlarged  Nonemergent focused ultrasound may be useful  The study was marked in [facility] for immediate notification              Workstation performed: [id number]         IR nephrostomy tube check/change/reposition/reinsertion/upsize    (Results Pending)     Recent Cultures (last 7 days):     Results from last 7 days   Lab Units 05/10/21  1611 05/10/21  1542 05/10/21  1530   GRAM STAIN RESULT   --  1+ Gram positive cocci in pairs*  1+ Gram negative rods*  No polys seen*  --    URINE CULTURE  >100,000 cfu/ml Morganella morganii*  >100,000 cfu/ml Enterococcus faecalis*  <10,000 cfu/ml Alpha Hemolytic Streptococcus*  <10,000 cfu/ml Gram Negative Marcel*  --  >100,000 cfu/ml    BODY FLUID CULTURE, STERILE   --  4+ Growth of Enterococcus faecalis*  4+ Growth of Morganella morganii*  2+ Growth of Enterobacter cloacae*  --        Last 24 Hours Medication List:   Current Facility-Administered Medications   Medication Dose Route Frequency Provider Last Rate    acetaminophen  650 mg Oral Q6H PRN Gomez Finney, DO      amoxicillin  500 mg Oral Q12H [address] Hasmukh Greenberg MD      apixaban  2 5 mg Oral BID Serg Galeano MD      aspirin  81 mg Oral Daily Serg Galeano MD      atorvastatin  40 mg Oral Daily With [employer] [facility], DO      calcitriol  0 25 mcg Oral Every Other Day Gomez Finney, DO      cholecalciferol  1,000 Units Oral Daily Gomez Finney,       [START ON 5/16/2021] ciprofloxacin  500 mg Oral Q24H Hasmukh Greenberg MD      citalopram  20 mg Oral Daily Gomez Finney, DO      docusate sodium  100 mg Oral BID Heather Doherty PA-C      fentaNYL  25 mcg Intravenous Q5 Min PRN Kinga Vo CRNA      [START ON 5/16/2021] iron polysaccharides  150 mg Oral Daily Hasmukh Greenberg MD      levothyroxine  75 mcg Oral Early Morning Gomez Finney, DO      melatonin  3 mg Oral HS Gomez Finney, DO      metoprolol tartrate  25 mg Oral BID Gomez Finney, DO      ondansetron  4 mg Intravenous Once PRN Kinga Vo CRNA      oxyCODONE  2 5 mg Oral Q4H PRN Roberta Abad PA-C      polyethylene glycol  17 g Oral BID Heather Doherty PA-C      ruxolitinib  10 mg Oral Daily Chiki Torrez MD      saccharomyces boulardii  250 mg Oral Daily Gomez Finney, DO  simethicone  80 mg Oral Q6H PRN Nancy Aaron PA-C      sodium bicarbonate  1,300 mg Oral BID after meals Tessa Morelos MD          Today, Patient Was Seen By: Guille Meléndez MD    ** Please Note: Dictation voice to text software may have been used in the creation of this document   **

## 2021-05-15 NOTE — BRIEF OP NOTE (RAD/CATH)
INTERVENTIONAL RADIOLOGY PROCEDURE NOTE    Date: 5/15/2021    Procedure: Left PCN to PCNU conversion    Preoperative diagnosis:   1  Hematuria    2  Hydronephrosis    3  Urinary tract infection         Postoperative diagnosis: Same  Surgeon: Olga Lidia Aquino MD     Assistant: None  No qualified resident was available  Blood loss: None    Specimens: None     Findings:   1  Successful left PCN to PCNU conversion using 8 Fr 22 cm catheter  2  Consider converting the left PCNU to retrograde nephroureteral stent when patient returns for catheter exchange  Complications: None immediate      Anesthesia: local

## 2021-05-15 NOTE — SEDATION DOCUMENTATION
PCN converted to PCNU by Dr Kasandra Simon, without complications  Patient tolerated well and vss    Patient transported to room and report given to Primary Rn

## 2021-05-15 NOTE — PLAN OF CARE
Problem: Potential for Falls  Goal: Patient will remain free of falls  Description: INTERVENTIONS:  - Assess patient frequently for physical needs  -  Identify cognitive and physical deficits and behaviors that affect risk of falls    -  [place] fall precautions as indicated by assessment   - Educate patient/family on patient safety including physical limitations  - Instruct patient to call for assistance with activity based on assessment  - Modify environment to reduce risk of injury  - Consider OT/PT consult to assist with strengthening/mobility  5/15/2021 0038 by Marcel Joe RN  Outcome: Progressing  5/14/2021 2012 by Marcel Joe RN  Outcome: Progressing     Problem: PAIN - ADULT  Goal: Verbalizes/displays adequate comfort level or baseline comfort level  Description: Interventions:  - Encourage patient to monitor pain and request assistance  - Assess pain using appropriate pain scale  - Administer analgesics based on type and severity of pain and evaluate response  - Implement non-pharmacological measures as appropriate and evaluate response  - Consider cultural and social influences on pain and pain management  - Notify physician/advanced practitioner if interventions unsuccessful or patient reports new pain  5/15/2021 0038 by Marcel Joe RN  Outcome: Progressing  5/14/2021 2012 by Marcel Joe RN  Outcome: Progressing     Problem: INFECTION - ADULT  Goal: Absence or prevention of progression during hospitalization  Description: INTERVENTIONS:  - Assess and monitor for signs and symptoms of infection  - Monitor lab/diagnostic results  - Monitor all insertion sites, i e  indwelling lines, tubes, and drains  - Monitor endotracheal if appropriate and nasal secretions for changes in amount and color  - Cameron appropriate cooling/warming therapies per order  - Administer medications as ordered  - Instruct and encourage patient and family to use good hand hygiene technique  - Identify and instruct in appropriate isolation precautions for identified infection/condition  5/15/2021 0038 by Edyta Ball RN  Outcome: Progressing  5/14/2021 2012 by Edyta Ball RN  Outcome: Progressing  Goal: Absence of fever/infection during neutropenic period  Description: INTERVENTIONS:  - Monitor WBC    5/15/2021 0038 by Edyta Ball RN  Outcome: Progressing  5/14/2021 2012 by Edyta Ball RN  Outcome: Progressing     Problem: SAFETY ADULT  Goal: Patient will remain free of falls  Description: INTERVENTIONS:  - Assess patient frequently for physical needs  -  Identify cognitive and physical deficits and behaviors that affect risk of falls    -  [place] fall precautions as indicated by assessment   - Educate patient/family on patient safety including physical limitations  - Instruct patient to call for assistance with activity based on assessment  - Modify environment to reduce risk of injury  - Consider OT/PT consult to assist with strengthening/mobility  5/15/2021 0038 by Edyta Ball RN  Outcome: Progressing  5/14/2021 2012 by Edyta Ball RN  Outcome: Progressing  Goal: Maintain or return to baseline ADL function  Description: INTERVENTIONS:  -  Assess patient's ability to carry out ADLs; assess patient's baseline for ADL function and identify physical deficits which impact ability to perform ADLs (bathing, care of mouth/teeth, toileting, grooming, dressing, etc )  - Assess/evaluate cause of self-care deficits   - Assess range of motion  - Assess patient's mobility; develop plan if impaired  - Assess patient's need for assistive devices and provide as appropriate  - Encourage maximum independence but intervene and supervise when necessary  - Involve family in performance of ADLs  - Assess for home care needs following discharge   - Consider OT consult to assist with ADL evaluation and planning for discharge  - Provide patient education as appropriate  5/15/2021 0038 by Edyta Ball RN  Outcome: Progressing  5/14/2021 2012 by Chas Ayala RN  Outcome: Progressing  Goal: Maintain or return mobility status to optimal level  Description: INTERVENTIONS:  - Assess patient's baseline mobility status (ambulation, transfers, stairs, etc )    - Identify cognitive and physical deficits and behaviors that affect mobility  - Identify mobility aids required to assist with transfers and/or ambulation (gait belt, sit-to-stand, lift, walker, cane, etc )  - [place] fall precautions as indicated by assessment  - Record patient progress and toleration of activity level on Mobility SBAR; progress patient to next Phase/Stage  - Instruct patient to call for assistance with activity based on assessment  - Consider rehabilitation consult to assist with strengthening/weightbearing, etc   5/15/2021 0038 by Chas Ayala RN  Outcome: Progressing  5/14/2021 2012 by Chas Ayala RN  Outcome: Progressing     Problem: DISCHARGE PLANNING  Goal: Discharge to home or other facility with appropriate resources  Description: INTERVENTIONS:  - Identify barriers to discharge w/patient and caregiver  - Arrange for needed discharge resources and transportation as appropriate  - Identify discharge learning needs (meds, wound care, etc )  - Arrange for interpretive services to assist at discharge as needed  - Refer to Case Management Department for coordinating discharge planning if the patient needs post-hospital services based on physician/advanced practitioner order or complex needs related to functional status, cognitive ability, or social support system  5/15/2021 0038 by Chas Ayala RN  Outcome: Progressing  5/14/2021 2012 by Chas Ayala RN  Outcome: Progressing     Problem: Knowledge Deficit  Goal: Patient/family/caregiver demonstrates understanding of disease process, treatment plan, medications, and discharge instructions  Description: Complete learning assessment and assess knowledge base    Interventions:  - Provide teaching at level of understanding  - Provide teaching via preferred learning methods  5/15/2021 0038 by Jey Serrano RN  Outcome: Progressing  5/14/2021 2012 by Jey Serrano RN  Outcome: Progressing

## 2021-05-15 NOTE — CONSULTS
Consultation - Infectious Disease   Keri Paez 77 y o  female MRN: [id number]  Unit/Bed#: [facility] 803-01 Encounter: [id number]      IMPRESSION & RECOMMENDATIONS:   Impression/Recommendations:  1  Obstructive left pyelonephritis  Due to # 2  Polymicrobial urine cultures with Morganella, Enterococcus, Enterobacter  Clinically stable without sepsis  Rec:  · Change ceftriaxone to cipro to continue for 2 more days (7 days total GNR coverage)  · Change ampicillin to amoxicillin to continue for 8 more days (10 days total Enterococcal coverage)    2  Left hydronephrosis  Felt due to stricture at anastomotic site in setting of prior cystectomy  Status post left PCN 5/10  Complicated by urinary leakage, status post PCN replacement 5/14  Rec:  · Continue PCN per Urology with close follow-up ongoing  · Eventual outpatient antegrade nephrostogram a potential internalization of stent    3  Gross hematuria  Thought due to chronic anticoagulation  Improved with holding Eliquis, aspirin  Rec:  · Follow urine output closely  · Close urology follow-up ongoing    4  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    5  Prior DVT/PE  On chronic anticoagulation, on hold in setting of hematuria as above  6   CKD  Baseline creatinine 3 4-3 5  Rec:  · Follow creatinine closely and dose-adjust antibiotics as indicated  · Recheck BMP in AM    7  PCV  On daily ruxolitinib    The above impression and plan was discussed in detail with Dr Mireya Dial  Antibiotics:  Ceftriaxone # 5  Ampicillin # 2    Thank you for this consultation  We will follow along with you  HISTORY OF PRESENT ILLNESS:  Reason for Consult: UTI    HPI: Keri Paez is a 77 y o  female with a prior history of nonfunctional bladder with elevated bladder pressures causing hydronephrosis and renal failure and ultimately underwent cystectomy with ileal conduit placement in 2016    She also has PCV on daily ruxolitinib and DVT/PE on anticoagulation  She presented to the ED on 05/08 for blood in her urostomy  Upon presentation she was noted to be afebrile with a normal heart rate  Her labs revealed a normal WBC with worsening renal function  She underwent a CTA/P which showed new left hydronephrosis with perinephric stranding suggesting pyelonephritis  She was initially observed off antibiotics  She was seen by Urology who felt the hydronephrosis was due to anastomotic stricture at her surgical site  She was evaluated by IR and underwent left PCN placement on 05/10 yielding bloody purulent material   She was started on ceftriaxone  She also had Eliquis and aspirin held  Plans are noted for eventual outpatient antegrade nephrostogram a potential internalization of stent  Postprocedure she developed some leakage around her PCN tube in this was exchanged on 05/14  She reports some leakage continuing today  Urine cultures have come back growing polymicrobial organisms  We are asked to comment on further evaluation and management  In performing this consult, I have reviewed prior admission and outpatient visit records in detail  REVIEW OF SYSTEMS:  Frustrated over urinary leakage  Denies fevers, chills, sweats, nausea, vomiting, or diarrhea  A complete system-based review of systems is otherwise negative      PAST MEDICAL HISTORY:  Past Medical History:   Diagnosis Date    Anxiety     Bowel obstruction (HCC)     Chronic kidney disease (CKD), stage IV (severe) (HCC)     stage IV    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    Circulation problem     Compression fracture of cervical spine (HCC)     Hernia of abdominal cavity     History of kidney problems     Hydronephrosis     Hypertension     IBS (irritable bowel syndrome)     Incontinence     Lung mass     Improving on PET/CT 1/2016    [facility]cythemia vera [facility] [address] )     Pulmonary embolism ([facility] [address] ) 2014    Shingles     Urinary tract infection Past Surgical History:   Procedure Laterality Date    ABDOMINAL ADHESION SURGERY N/A 8/9/2020    Procedure: LYSIS ADHESIONS;  Surgeon: Ollie Vieira DO;  [facility];  Service: General    BLADDER SUSPENSION      BOTOX INJECTION N/A 7/27/2016    Procedure: BOTOX INJECTION ;  Surgeon: Mirza Amaro MD;  [facility];  Service:    [address], RADICAL WITH ILEOCONDUIT N/A 10/4/2016    Procedure: Mercedes Camacho WITH ILEAL CONDUIT ;  Surgeon: Mirza Amaro MD;  [facility];  Service:    Camille Sicks CYSTOSCOPY W/ RETROGRADES Bilateral 7/27/2016    Procedure: Vishal Goldberg; RETROGRADE PYELOGRAM ;  Surgeon: Mirza Amaro MD;  [facility];  Service:     HERNIA REPAIR      IR AV FISTULAGRAM/GRAFTOGRAM  2/10/2021    IR NEPHROSTOMY TUBE CHECK/CHANGE/REPOSITION/REINSERTION/UPSIZE  5/14/2021    IR NEPHROSTOMY TUBE PLACEMENT  5/10/2021    IR NON-TUNNELED CENTRAL LINE PLACEMENT  7/17/2020    IR NON-TUNNELED CENTRAL LINE PLACEMENT  8/14/2020    LAPAROTOMY N/A 8/9/2020    Procedure: LAPAROTOMY EXPLORATORY, PARASTOMAL HERNIA REPAIR WITH MESH;  Surgeon: Ollie Vieira DO;  [facility];  Service: General    ND ANASTOMOSIS,AV,ANY SITE Right 1/5/2021    Procedure: Creation of right brachiobasilic fistula; Surgeon: Preethi Chairez MD;  [facility];  Service: Vascular    ND COLONOSCOPY FLX DX W/COLLJ SPEC WHEN PFRMD N/A 8/31/2016    Procedure: COLONOSCOPY;  Surgeon: Dejan Chen MD;  [facility];   Service: Gastroenterology    ND CYSTOSCOPY,INSERT URETERAL STENT Bilateral 7/27/2016    Procedure: STENT INSERTION; EXCISION OF MESH ;  Surgeon: Mirza Amaro MD;  [facility];  Service: Urology    TONSILLECTOMY      TUBAL LIGATION      WISDOM TOOTH EXTRACTION         FAMILY HISTORY:  Non-contributory    SOCIAL HISTORY:  Social History     Substance and Sexual Activity   Alcohol Use Not Currently    Frequency: Never    Binge frequency: Never    Comment: n/s     Social History     Substance and Sexual Activity   Drug Use Not Currently    Comment: n/a     Social History     Tobacco Use   Smoking Status Never Smoker   Smokeless Tobacco Never Used   Tobacco Comment    n/a       ALLERGIES:  Allergies   Allergen Reactions    Chlorhexidine Rash     petichi like rash when using chlorhexidine swabs prior to IV  MEDICATIONS:  All current active medications have been reviewed  PHYSICAL EXAM:  Vitals:  Temp:  [97 8 °F (36 6 °C)-98 5 °F (36 9 °C)] 98 5 °F (36 9 °C)  HR:  [77] 77  Resp:  [18-20] 18  BP: (116-154)/(64-94) 154/86  Temp (24hrs), Av 2 °F (36 8 °C), Min:97 8 °F (36 6 °C), Max:98 5 °F (36 9 °C)  Current: Temperature: 98 5 °F (36 9 °C)     Physical Exam:  General:  Well-nourished, well-developed, in no acute distress  Eyes:  Conjunctive clear with no hemorrhages or effusions  Oropharynx:  No ulcers, no lesions  Neck:  Supple, no lymphadenopathy  Lungs:  Normal respiratory excursion, no accessory muscle use  Cardiac:  Regular rate and rhythm, extremities well perfused  Abdomen:  Soft, non-tender, non-distended  Extremities:  No peripheral cyanosis, clubbing, or edema  Skin:  No rashes, no ulcers  Neurological:  Moves all four extremities spontaneously, sensation grossly intact  :  Left PCN intact  Gauze dry but gown noted to be moist     LABS, IMAGING, & OTHER STUDIES:  Lab Results:  I have personally reviewed pertinent labs    Results from last 7 days   Lab Units 05/15/21  0444 21  0511 21  0456  21  1318   POTASSIUM mmol/L 4 3 4 6 4 3   < >  --    CHLORIDE mmol/L 110* 111* 112*   < >  --    CO2 mmol/L 24 23 18*   < >  --    BUN mg/dL 55* 56* 55*   < >  --    CREATININE mg/dL 3 38* 3 46* 3 70*   < >  --    EGFR ml/min/1 73sq m 13 12 11   < >  --    CALCIUM mg/dL 8 5 8 9 8 7   < >  --    AST U/L  --   --   --   --  16   ALT U/L  --   --   --   --  12   ALK PHOS U/L  --   --   -- --  85    < > = values in this interval not displayed  Results from last 7 days   Lab Units 05/15/21  0444 05/14/21  0511 05/13/21  0627   WBC Thousand/uL 7 21 5 66 5 61   HEMOGLOBIN g/dL 8 0* 8 1* 8 3*   PLATELETS Thousands/uL 345 366 317     Results from last 7 days   Lab Units 05/10/21  1611 05/10/21  1542 05/10/21  1530 05/08/21  1338   GRAM STAIN RESULT   --  1+ Gram positive cocci in pairs*  1+ Gram negative rods*  No polys seen*  --   --    URINE CULTURE  >100,000 cfu/ml Morganella morganii*  >100,000 cfu/ml Enterococcus faecalis*  <10,000 cfu/ml Alpha Hemolytic Streptococcus*  <10,000 cfu/ml Gram Negative Marcel*  --  >100,000 cfu/ml  >100,000 cfu/ml    BODY FLUID CULTURE, STERILE   --  4+ Growth of Enterococcus faecalis*  4+ Growth of Morganella morganii*  2+ Growth of Enterobacter cloacae*  --   --          Imaging Studies:   I have personally reviewed pertinent imaging study reports and images in PACS  EKG, Pathology, and Other Studies:   I have personally reviewed pertinent reports

## 2021-05-15 NOTE — PLAN OF CARE
Problem: Potential for Falls  Goal: Patient will remain free of falls  Description: INTERVENTIONS:  - Assess patient frequently for physical needs  -  Identify cognitive and physical deficits and behaviors that affect risk of falls    -  [place] fall precautions as indicated by assessment   - Educate patient/family on patient safety including physical limitations  - Instruct patient to call for assistance with activity based on assessment  - Modify environment to reduce risk of injury  - Consider OT/PT consult to assist with strengthening/mobility  Outcome: Progressing     Problem: PAIN - ADULT  Goal: Verbalizes/displays adequate comfort level or baseline comfort level  Description: Interventions:  - Encourage patient to monitor pain and request assistance  - Assess pain using appropriate pain scale  - Administer analgesics based on type and severity of pain and evaluate response  - Implement non-pharmacological measures as appropriate and evaluate response  - Consider cultural and social influences on pain and pain management  - Notify physician/advanced practitioner if interventions unsuccessful or patient reports new pain  Outcome: Progressing     Problem: INFECTION - ADULT  Goal: Absence or prevention of progression during hospitalization  Description: INTERVENTIONS:  - Assess and monitor for signs and symptoms of infection  - Monitor lab/diagnostic results  - Monitor all insertion sites, i e  indwelling lines, tubes, and drains  - Monitor endotracheal if appropriate and nasal secretions for changes in amount and color  - [place] appropriate cooling/warming therapies per order  - Administer medications as ordered  - Instruct and encourage patient and family to use good hand hygiene technique  - Identify and instruct in appropriate isolation precautions for identified infection/condition  Outcome: Progressing  Goal: Absence of fever/infection during neutropenic period  Description: INTERVENTIONS:  - Monitor WBC    Outcome: Progressing     Problem: SAFETY ADULT  Goal: Patient will remain free of falls  Description: INTERVENTIONS:  - Assess patient frequently for physical needs  -  Identify cognitive and physical deficits and behaviors that affect risk of falls    -  [place] fall precautions as indicated by assessment   - Educate patient/family on patient safety including physical limitations  - Instruct patient to call for assistance with activity based on assessment  - Modify environment to reduce risk of injury  - Consider OT/PT consult to assist with strengthening/mobility  Outcome: Progressing  Goal: Maintain or return to baseline ADL function  Description: INTERVENTIONS:  -  Assess patient's ability to carry out ADLs; assess patient's baseline for ADL function and identify physical deficits which impact ability to perform ADLs (bathing, care of mouth/teeth, toileting, grooming, dressing, etc )  - Assess/evaluate cause of self-care deficits   - Assess range of motion  - Assess patient's mobility; develop plan if impaired  - Assess patient's need for assistive devices and provide as appropriate  - Encourage maximum independence but intervene and supervise when necessary  - Involve family in performance of ADLs  - Assess for home care needs following discharge   - Consider OT consult to assist with ADL evaluation and planning for discharge  - Provide patient education as appropriate  Outcome: Progressing  Goal: Maintain or return mobility status to optimal level  Description: INTERVENTIONS:  - Assess patient's baseline mobility status (ambulation, transfers, stairs, etc )    - Identify cognitive and physical deficits and behaviors that affect mobility  - Identify mobility aids required to assist with transfers and/or ambulation (gait belt, sit-to-stand, lift, walker, cane, etc )  - [place] fall precautions as indicated by assessment  - Record patient progress and toleration of activity level on Mobility SBAR; progress patient to next Phase/Stage  - Instruct patient to call for assistance with activity based on assessment  - Consider rehabilitation consult to assist with strengthening/weightbearing, etc   Outcome: Progressing     Problem: DISCHARGE PLANNING  Goal: Discharge to home or other facility with appropriate resources  Description: INTERVENTIONS:  - Identify barriers to discharge w/patient and caregiver  - Arrange for needed discharge resources and transportation as appropriate  - Identify discharge learning needs (meds, wound care, etc )  - Arrange for interpretive services to assist at discharge as needed  - Refer to Case Management Department for coordinating discharge planning if the patient needs post-hospital services based on physician/advanced practitioner order or complex needs related to functional status, cognitive ability, or social support system  Outcome: Progressing     Problem: Knowledge Deficit  Goal: Patient/family/caregiver demonstrates understanding of disease process, treatment plan, medications, and discharge instructions  Description: Complete learning assessment and assess knowledge base    Interventions:  - Provide teaching at level of understanding  - Provide teaching via preferred learning methods  Outcome: Progressing

## 2021-05-15 NOTE — ASSESSMENT & PLAN NOTE
Lab Results   Component Value Date    EGFR 13 05/15/2021    EGFR 12 05/14/2021    EGFR 11 05/13/2021    CREATININE 3 38 (H) 05/15/2021    CREATININE 3 46 (H) 05/14/2021    CREATININE 3 70 (H) 05/13/2021   Estimated Creatinine Clearance: 14 5 mL/min (A) (by C-G formula based on SCr of 3 38 mg/dL (H))    Cr near baseline 3 4-3 5, follows with Dr Lauro Magana  · No significant improvement s/p nephrostomy tube  · Renal input appreciated   · Monitor BMP

## 2021-05-15 NOTE — PROGRESS NOTES
Patients nephrostomy draining into bag but continues to leak around site  Naila Chapman with [facility] aware, interventional radiology notified

## 2021-05-15 NOTE — ASSESSMENT & PLAN NOTE
· Urine cultures and body fluid culture from IR procedure on 05/10 are growing ampicillin susceptible Enterococcus, ceftriaxone susceptible Morganella and Enterobacter cloacae  · Patient has known history of cystectomy and ileal conduit  · Antibiotics switched from ceftriaxone to Cipro for 2 more days following Gram-negative coverage and from ampicillin to amoxicillin for Enterococcus coverage for 8 more days by ID  Appreciate input

## 2021-05-15 NOTE — ASSESSMENT & PLAN NOTE
Many years ago per patient  · Eliquis held due to hematuria and need for nephrostomy tube placement  · Restarted anticoagulation on 5/15

## 2021-05-16 VITALS
HEART RATE: 67 BPM | RESPIRATION RATE: 16 BRPM | WEIGHT: 197.3 LBS | SYSTOLIC BLOOD PRESSURE: 135 MMHG | HEIGHT: 60 IN | BODY MASS INDEX: 38.73 KG/M2 | TEMPERATURE: 97.4 F | OXYGEN SATURATION: 96 % | DIASTOLIC BLOOD PRESSURE: 77 MMHG

## 2021-05-16 PROBLEM — K66.8 CYSTIC LESION OF ABDOMINAL VISCERA: Status: ACTIVE | Noted: 2021-05-16

## 2021-05-16 LAB
ANION GAP SERPL CALCULATED.3IONS-SCNC: 6 MMOL/L (ref 4–13)
BUN SERPL-MCNC: 51 MG/DL (ref 5–25)
CALCIUM SERPL-MCNC: 8.2 MG/DL (ref 8.3–10.1)
CHLORIDE SERPL-SCNC: 112 MMOL/L (ref 100–108)
CO2 SERPL-SCNC: 24 MMOL/L (ref 21–32)
CREAT SERPL-MCNC: 3.37 MG/DL (ref 0.6–1.3)
ERYTHROCYTE [DISTWIDTH] IN BLOOD BY AUTOMATED COUNT: 14.3 % (ref 11.6–15.1)
GFR SERPL CREATININE-BSD FRML MDRD: 13 ML/MIN/1.73SQ M
GLUCOSE SERPL-MCNC: 95 MG/DL (ref 65–140)
HCT VFR BLD AUTO: 26 % (ref 34.8–46.1)
HGB BLD-MCNC: 8.1 G/DL (ref 11.5–15.4)
MCH RBC QN AUTO: 29.6 PG (ref 26.8–34.3)
MCHC RBC AUTO-ENTMCNC: 31.2 G/DL (ref 31.4–37.4)
MCV RBC AUTO: 95 FL (ref 82–98)
NRBC BLD AUTO-RTO: 0 /100 WBCS
PLATELET # BLD AUTO: 379 THOUSANDS/UL (ref 149–390)
PMV BLD AUTO: 9.7 FL (ref 8.9–12.7)
POTASSIUM SERPL-SCNC: 4.4 MMOL/L (ref 3.5–5.3)
RBC # BLD AUTO: 2.74 MILLION/UL (ref 3.81–5.12)
SODIUM SERPL-SCNC: 142 MMOL/L (ref 136–145)
WBC # BLD AUTO: 7.62 THOUSAND/UL (ref 4.31–10.16)

## 2021-05-16 PROCEDURE — 99239 HOSP IP/OBS DSCHRG MGMT >30: CPT | Performed by: INTERNAL MEDICINE

## 2021-05-16 PROCEDURE — 85027 COMPLETE CBC AUTOMATED: CPT | Performed by: INTERNAL MEDICINE

## 2021-05-16 PROCEDURE — 80048 BASIC METABOLIC PNL TOTAL CA: CPT | Performed by: INTERNAL MEDICINE

## 2021-05-16 PROCEDURE — 99232 SBSQ HOSP IP/OBS MODERATE 35: CPT | Performed by: INTERNAL MEDICINE

## 2021-05-16 RX ORDER — DOCUSATE SODIUM 100 MG/1
100 CAPSULE, LIQUID FILLED ORAL 2 TIMES DAILY
Qty: 10 CAPSULE | Refills: 0 | Status: SHIPPED | OUTPATIENT
Start: 2021-05-16 | End: 2021-09-20 | Stop reason: ALTCHOICE

## 2021-05-16 RX ORDER — POLYETHYLENE GLYCOL 3350 17 G/17G
17 POWDER, FOR SOLUTION ORAL DAILY PRN
Qty: 10 EACH | Refills: 0 | Status: SHIPPED | OUTPATIENT
Start: 2021-05-16 | End: 2021-09-20 | Stop reason: ALTCHOICE

## 2021-05-16 RX ORDER — AMOXICILLIN 500 MG/1
500 CAPSULE ORAL EVERY 12 HOURS SCHEDULED
Qty: 14 CAPSULE | Refills: 0 | Status: SHIPPED | OUTPATIENT
Start: 2021-05-16 | End: 2021-05-23

## 2021-05-16 RX ORDER — SODIUM BICARBONATE 650 MG/1
1300 TABLET ORAL
Qty: 60 TABLET | Refills: 0 | Status: SHIPPED | OUTPATIENT
Start: 2021-05-16 | End: 2021-07-07 | Stop reason: SDUPTHER

## 2021-05-16 RX ORDER — CIPROFLOXACIN 500 MG/1
500 TABLET, FILM COATED ORAL EVERY 24 HOURS
Qty: 1 TABLET | Refills: 0 | Status: SHIPPED | OUTPATIENT
Start: 2021-05-17 | End: 2021-05-18

## 2021-05-16 RX ORDER — IRON POLYSACCHARIDE COMPLEX 150 MG
150 CAPSULE ORAL DAILY
Qty: 30 CAPSULE | Refills: 0 | Status: SHIPPED | OUTPATIENT
Start: 2021-05-16 | End: 2021-07-07

## 2021-05-16 RX ADMIN — METOPROLOL TARTRATE 25 MG: 25 TABLET, FILM COATED ORAL at 08:38

## 2021-05-16 RX ADMIN — Medication 150 MG: at 11:53

## 2021-05-16 RX ADMIN — APIXABAN 2.5 MG: 2.5 TABLET, FILM COATED ORAL at 08:38

## 2021-05-16 RX ADMIN — CITALOPRAM HYDROBROMIDE 20 MG: 20 TABLET ORAL at 08:38

## 2021-05-16 RX ADMIN — SODIUM BICARBONATE 650 MG TABLET 1300 MG: at 08:38

## 2021-05-16 RX ADMIN — CIPROFLOXACIN HYDROCHLORIDE 500 MG: 500 TABLET, FILM COATED ORAL at 08:37

## 2021-05-16 RX ADMIN — Medication 1000 UNITS: at 08:38

## 2021-05-16 RX ADMIN — LEVOTHYROXINE SODIUM 75 MCG: 75 TABLET ORAL at 06:30

## 2021-05-16 RX ADMIN — RUXOLITINIB 10 MG: 10 TABLET ORAL at 08:38

## 2021-05-16 RX ADMIN — OXYCODONE HYDROCHLORIDE 2.5 MG: 5 TABLET ORAL at 11:53

## 2021-05-16 RX ADMIN — ASPIRIN 81 MG: 81 TABLET, COATED ORAL at 08:37

## 2021-05-16 RX ADMIN — AMOXICILLIN 500 MG: 500 CAPSULE ORAL at 08:38

## 2021-05-16 RX ADMIN — CALCITRIOL 0.25 MCG: 0.25 CAPSULE, LIQUID FILLED ORAL at 08:38

## 2021-05-16 RX ADMIN — Medication 250 MG: at 08:38

## 2021-05-16 RX ADMIN — DOCUSATE SODIUM 100 MG: 100 CAPSULE ORAL at 08:38

## 2021-05-16 NOTE — ASSESSMENT & PLAN NOTE
Lab Results   Component Value Date    EGFR 13 05/16/2021    EGFR 13 05/15/2021    EGFR 12 05/14/2021    CREATININE 3 37 (H) 05/16/2021    CREATININE 3 38 (H) 05/15/2021    CREATININE 3 46 (H) 05/14/2021   Estimated Creatinine Clearance: 14 6 mL/min (A) (by C-G formula based on SCr of 3 37 mg/dL (H))    Cr near baseline 3 4-3 5, follows with Dr Selena Gan  · No significant improvement s/p nephrostomy tube  · Renal input appreciated   · Monitor BMP

## 2021-05-16 NOTE — PLAN OF CARE
Problem: Potential for Falls  Goal: Patient will remain free of falls  Description: INTERVENTIONS:  - Assess patient frequently for physical needs  -  Identify cognitive and physical deficits and behaviors that affect risk of falls    -  [place] fall precautions as indicated by assessment   - Educate patient/family on patient safety including physical limitations  - Instruct patient to call for assistance with activity based on assessment  - Modify environment to reduce risk of injury  - Consider OT/PT consult to assist with strengthening/mobility  Outcome: Progressing     Problem: PAIN - ADULT  Goal: Verbalizes/displays adequate comfort level or baseline comfort level  Description: Interventions:  - Encourage patient to monitor pain and request assistance  - Assess pain using appropriate pain scale  - Administer analgesics based on type and severity of pain and evaluate response  - Implement non-pharmacological measures as appropriate and evaluate response  - Consider cultural and social influences on pain and pain management  - Notify physician/advanced practitioner if interventions unsuccessful or patient reports new pain  Outcome: Progressing     Problem: INFECTION - ADULT  Goal: Absence or prevention of progression during hospitalization  Description: INTERVENTIONS:  - Assess and monitor for signs and symptoms of infection  - Monitor lab/diagnostic results  - Monitor all insertion sites, i e  indwelling lines, tubes, and drains  - Monitor endotracheal if appropriate and nasal secretions for changes in amount and color  - [place] appropriate cooling/warming therapies per order  - Administer medications as ordered  - Instruct and encourage patient and family to use good hand hygiene technique  - Identify and instruct in appropriate isolation precautions for identified infection/condition  Outcome: Progressing  Goal: Absence of fever/infection during neutropenic period  Description: INTERVENTIONS:  - Monitor WBC    Outcome: Progressing     Problem: SAFETY ADULT  Goal: Patient will remain free of falls  Description: INTERVENTIONS:  - Assess patient frequently for physical needs  -  Identify cognitive and physical deficits and behaviors that affect risk of falls    -  [place] fall precautions as indicated by assessment   - Educate patient/family on patient safety including physical limitations  - Instruct patient to call for assistance with activity based on assessment  - Modify environment to reduce risk of injury  - Consider OT/PT consult to assist with strengthening/mobility  Outcome: Progressing  Goal: Maintain or return to baseline ADL function  Description: INTERVENTIONS:  -  Assess patient's ability to carry out ADLs; assess patient's baseline for ADL function and identify physical deficits which impact ability to perform ADLs (bathing, care of mouth/teeth, toileting, grooming, dressing, etc )  - Assess/evaluate cause of self-care deficits   - Assess range of motion  - Assess patient's mobility; develop plan if impaired  - Assess patient's need for assistive devices and provide as appropriate  - Encourage maximum independence but intervene and supervise when necessary  - Involve family in performance of ADLs  - Assess for home care needs following discharge   - Consider OT consult to assist with ADL evaluation and planning for discharge  - Provide patient education as appropriate  Outcome: Progressing  Goal: Maintain or return mobility status to optimal level  Description: INTERVENTIONS:  - Assess patient's baseline mobility status (ambulation, transfers, stairs, etc )    - Identify cognitive and physical deficits and behaviors that affect mobility  - Identify mobility aids required to assist with transfers and/or ambulation (gait belt, sit-to-stand, lift, walker, cane, etc )  - [place] fall precautions as indicated by assessment  - Record patient progress and toleration of activity level on Mobility SBAR; progress patient to next Phase/Stage  - Instruct patient to call for assistance with activity based on assessment  - Consider rehabilitation consult to assist with strengthening/weightbearing, etc   Outcome: Progressing     Problem: DISCHARGE PLANNING  Goal: Discharge to home or other facility with appropriate resources  Description: INTERVENTIONS:  - Identify barriers to discharge w/patient and caregiver  - Arrange for needed discharge resources and transportation as appropriate  - Identify discharge learning needs (meds, wound care, etc )  - Arrange for interpretive services to assist at discharge as needed  - Refer to Case Management Department for coordinating discharge planning if the patient needs post-hospital services based on physician/advanced practitioner order or complex needs related to functional status, cognitive ability, or social support system  Outcome: Progressing     Problem: Knowledge Deficit  Goal: Patient/family/caregiver demonstrates understanding of disease process, treatment plan, medications, and discharge instructions  Description: Complete learning assessment and assess knowledge base    Interventions:  - Provide teaching at level of understanding  - Provide teaching via preferred learning methods  Outcome: Progressing

## 2021-05-16 NOTE — ASSESSMENT & PLAN NOTE
Patient follows up with outpatient Hematology Oncology, currently on Jakafi 10 mg Oral Daily  · Brought medication in from home

## 2021-05-16 NOTE — DISCHARGE SUMMARY
[facility]  Discharge- Laura Rivera [id number], 77 y o  female MRN: [id number]  Unit/Bed#: [facility] 803-01 Encounter: [id number]  Primary Care Provider: Richard Campbell MD   Date and time admitted to hospital: 5/8/2021 11:18 AM    * Gross hematuria  Assessment & Plan  Patient presented to ED for gross hematuria (likely due to eliquis)  Has PMHx of CKD Stage 5, obstructive uropathy s/p cystectomy with ilial conduit  · CT abdomin pelvis: New left hydronephrosis with perinephric stranding   · Suggests the possibility of more acute obstruction and/or pyelonephritis  · IR following, s/p L nephrostomy tube placement on 5/11 with Dr Kofi Washington (IR)  · Monitor H/H, transfuse PRN--s/p 1 unit PRBC 5/12  · Polymicrobial urine culture from procedures growing Morganella, Enterococcus, Enterobacter  · Team discussed with Urology and they were okay with restarting anticoagulation on 05/14 although patient underwent left PCN exchange on 05/14  · Left PCN was changed PCNU on 5/15 due to leaking  No further leaking from the tube  Outpatient Urology and IR follow-up  · Restarted anticoagulation on 05/15  · PT/OT input appreciated  · Home nursing and PT OT arranged by   · Outpatient urology follow-up  Obstructive left pyelonephritis  Assessment & Plan  · Urine cultures and body fluid culture from IR procedure on 05/10 are growing ampicillin susceptible Enterococcus, ceftriaxone susceptible Morganella and Enterobacter cloacae  · Patient has known history of cystectomy and ileal conduit  · Antibiotics switched from ceftriaxone to Cipro for 1 more day following Gram-negative coverage and from ampicillin to amoxicillin for Enterococcus coverage for 7 more days by ID  Appreciate input  Right upper quadrant cyst  Assessment & Plan  Incidentally noted on CT scan  Slightly enlarged as compared to CT scan in 2015    Consider obtaining Non emergent focused ultrasound outpatient  Constipation  Assessment & Plan  · CT scan on admission suggested ileus  · Now patient is tolerating diet well and having regular bowel movements  Anemia  Assessment & Plan  Anemia noted--likely 2/2 hematuria coupled with anemia of CKD at baseline (baseline around 9-10)  s/p unit of PRBC on 5/12   Now hemoglobin stable between 8-9  · No JENELLE due to PE hx  · Will avoid IV iron as well given ? infection  · Oral iron started     Hydronephrosis of left kidney  Assessment & Plan  IR and urology consulted  · S/P Perc neph tube on 5/11 with IR   · Flush with 10 mL NSS BID  · F/u IR q3 months for routine tube exchanges  · Will need ongoing nephrostomy tube drainage for at least several weeks and then re-eval with antegrade study    Hyperlipemia  Assessment & Plan  · Continue statin    Stage 5 chronic kidney disease not on chronic dialysis [facility])  Assessment & Plan  Lab Results   Component Value Date    EGFR 13 05/16/2021    EGFR 13 05/15/2021    EGFR 12 05/14/2021    CREATININE 3 37 (H) 05/16/2021    CREATININE 3 38 (H) 05/15/2021    CREATININE 3 46 (H) 05/14/2021   Estimated Creatinine Clearance: 14 6 mL/min (A) (by C-G formula based on SCr of 3 37 mg/dL (H))  Cr near baseline 3 4-3 5, follows with Dr Davion Valdovinos  · No significant improvement s/p nephrostomy tube  · Renal input appreciated   · Monitor BMP     Polycythemia vera [facility])  Assessment & Plan  Patient follows up with outpatient Hematology Oncology, currently on Jakafi 10 mg Oral Daily  · Brought medication in from home       Obstructive uropathy  Assessment & Plan  S/p ileostomy for nonfunctioning bladder and chronic hydro  · OP urology follow up    Chronic saddle pulmonary embolism ([address] )  Assessment & Plan  Many years ago per patient  · Eliquis held due to hematuria and need for nephrostomy tube placement  · Restarted anticoagulation on 5/15      Discharge Summary - [facility]    Patient Information: Michael Huber 77 y o  female MRN: [id number]  Unit/Bed#: [facility] 803-01 Encounter: [id number]    Discharging Physician / Practitioner: Marion Cooper MD  PCP: Casimiro Kaufman MD  Admission Date: 5/8/2021  Discharge Date: 05/16/21    Reason for Admission:  Hematuria    Discharge Diagnoses:     Principal Problem:    Gross hematuria  Active Problems:    Obstructive left pyelonephritis    Chronic saddle pulmonary embolism ([facility] [address] )    Obstructive uropathy    Polycythemia vera ([facility] [address] )    Stage 5 chronic kidney disease not on chronic dialysis ([facility] [address] )    Hyperlipemia    Hydronephrosis of left kidney    Anemia    Constipation    Right upper quadrant cyst  Resolved Problems:    * No resolved hospital problems  *      Consultations During Hospital Stay:  · Urology  · Nephrology  · Infectious disease  · IR    Procedures Performed:     · Left PCN tube placement and exchange    Significant Findings / Test Results:     IR nephrostomy to nephroureteral stent   Final Result by Olive Knox MD (05/15 1524)      1  The left PCN was mildly retracted  2  Successful conversion of left PCN to PCNU using 8 5 Estonian 22 cm catheter  Plan:       Consider converting the left PCNU catheter into retrograde nephroureteral stent during next catheter exchange    _______________________________________________________________      PROCEDURE SUMMARY   - Antegrade nephrostogram(s) via the existing access    - Left PCN to PCNU catheter conversion       PROCEDURE DETAILS:      Pre-procedure   Consent: Existing informed consent was utilized and time-out was performed prior to the procedure  Preparation: The site was prepared and draped using maximal sterile barrier technique including cutaneous antisepsis  Left PCN to PCNU catheter conversion   Local anesthesia was administered  Initial nephrostogram was performed  The existing PCN was noted to be mildly retracted into a calyx  The existing PCN was exchanged for an 8 5 Estonian 22 cm PCNU catheter over a stiff Amplatz wire  Contrast was injected    through the new catheter to confirm proper positioning  Contrast   Contrast agent: Omnipaque 350   Contrast volume (mL): 10      Radiation Dose   Fluoroscopy time (minutes): 5 2     Reference air kerma (mGy): 40 123    Kerma area product (Gy-cm2): 4 3       Additional Details   Specimens removed: Existing PCN catheter   Estimated blood loss (mL): None   Standardized report: [email or web address]_[email or web address]_v3      Attestation   Signer name: [facility]   I attest that I was present for the entire procedure  I reviewed the stored images and agree with the report as written  Workstation performed: [id number]         IR nephrostomy tube check/change/reposition/reinsertion/upsize   Final Result by David Castellon MD (05/14 1702)      1  The existing left PCN appears mildly retracted  2  The left PCN was exchanged for a new catheter using 8 Irish catheter  Plan:       Return in 2 months for routine left PCN exchange as previously scheduled  _______________________________________________________________      PROCEDURE SUMMARY   - Antegrade nephrostogram(s) via the existing access    - Left PCN exchange       PROCEDURE DETAILS:      Pre-procedure   Consent: Existing informed consent was utilized and time-out was performed prior to the procedure  Preparation: The site was prepared and draped using maximal sterile barrier technique including cutaneous antisepsis  Left genitourinary catheter exchange   Initial nephrostogram was performed  The existing catheter was removed over a Glidewire  A new 8 Irish catheter was advanced over the wire with pigtail loop forming within the renal collecting system  Contrast was injected through the new catheter under    fluoroscopy to confirm proper positioning  Catheter was secured to skin using 2-0 Prolene suture and connected to gravity drainage        Contrast   Contrast agent: Omnipaque 300   Contrast volume (mL): 10      Radiation Dose Fluoroscopy time (minutes): 3 6     Reference air kerma (mGy): 109       Additional Details   Specimens removed: Existing left PCN catheter   Estimated blood loss (mL): None   Standardized report: [email or web address]_GUCatheterConversion_v3      Attestation   Signer name: [facility]   I attest that I was present for the entire procedure  I reviewed the stored images and agree with the report as written  Workstation performed: [id number]         IR nephrostomy tube placement   Final Result by Angelina Rosales MD (05/11 2143)   Impression:    Ultrasound and fluoroscopically guided placement of a left 8 [place] percutaneous nephrostomy  Bloody purulent material in the collecting system      Renal atrophy with distortion of the left renal collecting system and suspicion for distal ureteral stricture         Workstation performed: [id number]         XR knee 4+ views left injury   Final Result by Marko Aviles MD (05/09 6062)      No acute osseous abnormality  Degenerative changes as described  Workstation performed: [id number]         CT abdomen pelvis wo contrast   Final Result by Aaron Woods MD (05/08 7337)      New left hydronephrosis with perinephric stranding suggests the possibility of more acute obstruction and/or pyelonephritis  The degree of dilatation on the left is more similar to a study from August 2020  There is also dilated ureter extending to    surgical sutures in the region of the ileal conduit suggesting possible stricture  No obvious stone is seen distally  Urologic evaluation is advised  Nonobstructing stones are seen in the right kidney  Diffuse dilatation of large and small bowel suggest ileus, more likely than obstruction, although the degree of small bowel dilatation is somewhat improved  Follow-up is suggested  Cyst is again noted in the right lower quadrant measuring up to 8 cm in size  This was noted as far back as 2015 although has slightly enlarged  Nonemergent focused ultrasound may be useful  The study was marked in [facility] for immediate notification  Workstation performed: [id number]         IR nephrostomy tube check/change/reposition/reinsertion/upsize    (Results Pending)       Incidental Findings:   · Right upper quadrant cyst        Hospital Course:     Rusty Maharaj is a 77 y o  female patient who originally presented to the hospital on 5/8/2021 due to hematuria  She was noted to have hydronephrosis and pyelonephritis and was evaluated by Urology and underwent left percutaneous nephrostomy tube placement by IR  Cultures from procedure grew multiple organisms and she is on antibiotics for that as recommended by Infectious Diseases  PCN tube was exchanged to PCNU by IR due to leaking  Anticoagulation was restarted prior to discharge  Patient will need close outpatient follow-up with Interventional Radiology and Urology  Non emergent right upper quadrant ultrasound is suggested for cyst evaluation  PT OT evaluated the patient and  arranged for home physical therapy and home nursing care for patient  Please refer to detailed assessment and plan above for further details  Condition at Discharge: stable     Discharge Day Visit / Exam:     Subjective:  No overnight events  No further leaking from nephrostomy tube  Patient is okay with discharge plan today  Vitals: Blood Pressure: 135/77 (05/16/21 0711)  Pulse: 67 (05/15/21 1516)  Temperature: (!) 97 4 °F (36 3 °C) (05/16/21 0711)  Temp Source: Oral (05/14/21 1700)  Respirations: 16 (05/16/21 0711)  Height: 5' (152 4 cm) (05/08/21 1435)  Weight - Scale: 89 5 kg (197 lb 4 8 oz) (05/08/21 1435)  SpO2: 96 % (05/15/21 1516)  Exam:   Physical Exam  Constitutional:       General: She is not in acute distress  Eyes:      Pupils: Pupils are equal, round, and reactive to light     Cardiovascular:      Rate and Rhythm: Normal rate and regular rhythm       Heart sounds: Normal heart sounds  No murmur  Pulmonary:      Effort: No respiratory distress       Breath sounds: No wheezing or rales  Abdominal:      General: Bowel sounds are normal  There is no distension       Palpations: Abdomen is soft       Tenderness: There is no abdominal tenderness  Musculoskeletal:         General: No swelling  Skin:     General: Skin is warm  Neurological:      Mental Status: She is alert       Comments: Awake alert and communicative      Discussion with Family:  Discussed plan of care with patient's daughter on phone    Discharge instructions/Information to patient and family:   See after visit summary for information provided to patient and family  Provisions for Follow-Up Care:  See after visit summary for information related to follow-up care and any pertinent home health orders  Disposition:     Home with [facility] Services (Reminder: Complete face to face encounter)         Discharge Statement:  I spent 40 minutes discharging the patient  This time was spent on the day of discharge  I had direct contact with the patient on the day of discharge  Greater than 50% of the total time was spent examining patient, answering all patient questions, arranging and discussing plan of care with patient as well as directly providing post-discharge instructions  Additional time then spent on discharge activities  Discharge Medications:  See after visit summary for reconciled discharge medications provided to patient and family        ** Please Note: This note has been constructed using a voice recognition system **

## 2021-05-16 NOTE — ASSESSMENT & PLAN NOTE
Patient presented to ED for gross hematuria (likely due to eliquis)  Has PMHx of CKD Stage 5, obstructive uropathy s/p cystectomy with ilial conduit  · CT abdomin pelvis: New left hydronephrosis with perinephric stranding   · Suggests the possibility of more acute obstruction and/or pyelonephritis  · IR following, s/p L nephrostomy tube placement on 5/11 with Dr Rosalie Freire (IR)  · Monitor H/H, transfuse PRN--s/p 1 unit PRBC 5/12  · Polymicrobial urine culture from procedures growing Morganella, Enterococcus, Enterobacter  · Team discussed with Urology and they were okay with restarting anticoagulation on 05/14 although patient underwent left PCN exchange on 05/14  · Left PCN was changed PCNU on 5/15 due to leaking  No further leaking from the tube  Outpatient Urology and IR follow-up  · Restarted anticoagulation on 05/15  · PT/OT input appreciated  · Home nursing and PT OT arranged by   · Outpatient urology follow-up

## 2021-05-16 NOTE — ASSESSMENT & PLAN NOTE
· Urine cultures and body fluid culture from IR procedure on 05/10 are growing ampicillin susceptible Enterococcus, ceftriaxone susceptible Morganella and Enterobacter cloacae  · Patient has known history of cystectomy and ileal conduit  · Antibiotics switched from ceftriaxone to Cipro for 1 more day following Gram-negative coverage and from ampicillin to amoxicillin for Enterococcus coverage for 7 more days by ID  Appreciate input

## 2021-05-16 NOTE — CASE MANAGEMENT
CM informed by Dr Reshma Carvajal, pt is medically stable for d/c today 5/16  Pt will be d/c'd home with [facility]: PT/OT  CM discussed recommendation for RW with pt this day  Pt reports she currently owns a RW  DME order not required for D/C at this time  Pt's family will provide transport upon D/C      11:42 AM:   Pt will also require SN upon d/c for nephrostomy tube care and education  CM contacted [facility] to inform of same  CM spoke with Muna Joe, who reports a nurse will likely visit pt tomorrow, however requested teaching prior to D/C in the event a nurse will be present within 48 hours of D/C  CM communicationed same with bedside RN and Dr Eliel Corado  CM requested script for flushes  additional flushes will be provided to pt prior to d/c  IMM reviewed with pt and completed  Copy placed in medical records bin  No additional need identified for d/c at this time

## 2021-05-16 NOTE — PROGRESS NOTES
NEPHROLOGY PROGRESS NOTE   Jacquelin Gaitan 77 y o  female MRN: [id number]  Unit/Bed#: [facility] 803-01 Encounter: [id number]  Reason for Consult: LUANN/CKD    ASSESSMENT AND PLAN:  80-year-old female with history cystectomy ileal conduit and chronic hydronephrosis with advanced chronic kidney disease stage 5 followed by Dr Tunde Lazaro:  She now presents with gross hematuria, CT scan demonstrated left hydronephrosis and perinephric stranding and more acute obstruction possible pyelonephritis   Status post antibiotics and left PCN placed 5/10     CKD stage 5, baseline creatinine 3 5 to 3 6, follows with Dr Tunde Lazaro  -suspected secondary to obstructive uropathy in the setting of functional solitary kidney, multiple episodes of prior LUANN causing residual damage  -creatinine 3 3 overall stable at baseline   -avoid nephrotoxins or NSAIDs     Hypertension, blood pressure overall acceptable     CKD BMD, continue to monitor phosphorus, PTH  On calcitriol 3 times a week  CKD anemia, not on Epogen due to history of PE, transfuse p r n  For hemoglobin less than seven  On p o  Oral iron supplement      Status post left PCN for obstructive uropathy, left PCN was changed to PCNU by IR yesterday       Discussed above plan in detail with primary team   Overall stable from renal standpoint  SUBJECTIVE:  Patient seen and examined at bedside   No chest pain, shortness of breath, nausea, vomiting, abdominal pain    OBJECTIVE:  Current Weight: Weight - Scale: 89 5 kg (197 lb 4 8 oz)  Vitals:    05/16/21 0711   BP: 135/77   Pulse:    Resp: 16   Temp: (!) 97 4 °F (36 3 °C)   SpO2:        Intake/Output Summary (Last 24 hours) at 5/16/2021 0921  Last data filed at 5/16/2021 0896  Gross per 24 hour   Intake 840 ml   Output 1250 ml   Net -410 ml     Wt Readings from Last 3 Encounters:   05/08/21 89 5 kg (197 lb 4 8 oz)   04/23/21 88 9 kg (196 lb)   04/19/21 88 kg (194 lb)     Temp Readings from Last 3 Encounters:   05/16/21 (!) 97 4 °F (36 3 °C) 04/26/21 (!) 97 2 °F (36 2 °C) (Temporal)   04/23/21 (!) 97 °F (36 1 °C) (Temporal)     BP Readings from Last 3 Encounters:   05/16/21 135/77   04/26/21 158/80   04/23/21 140/80     Pulse Readings from Last 3 Encounters:   05/15/21 67   04/26/21 63   04/23/21 65        Physical Examination:  General:  Sitting in bed, no acute distress   Eyes:  Mild conjunctival pallor present  ENT:  External examination of ears and nose unremarkable  Neck:  No obvious lymphadenopathy appreciated  Respiratory:  Bilateral air entry present  CVS:  S1, S2 present  GI:  Soft, nondistended, ileal conduit present  CNS:  Active alert oriented x3  Skin:  No new rash in legs  Musculoskeletal:  No obvious new gross deformity noted    Medications:    Current Facility-Administered Medications:     acetaminophen (TYLENOL) tablet 650 mg, 650 mg, Oral, Q6H PRN, Mateo Zayas,     amoxicillin (AMOXIL) capsule 500 mg, 500 mg, Oral, Q12H [address], Nya Valdivia MD, 500 mg at 05/16/21 7983    apixaban (ELIQUIS) tablet 2 5 mg, 2 5 mg, Oral, BID, Dale Burger MD, 2 5 mg at 05/16/21 2726    aspirin (ECOTRIN LOW STRENGTH) EC tablet 81 mg, 81 mg, Oral, Daily, Dale Burger MD, 81 mg at 05/16/21 8197    atorvastatin (LIPITOR) tablet 40 mg, 40 mg, Oral, Daily With Dinner, Mateo Zayas DO, 40 mg at 05/15/21 1712    calcitriol (ROCALTROL) capsule 0 25 mcg, 0 25 mcg, Oral, Every Other Day, Mateo Zayas, , 0 25 mcg at 05/16/21 6026    cholecalciferol (VITAMIN D3) tablet 1,000 Units, 1,000 Units, Oral, Daily, Mateo Zayas DO, 1,000 Units at 05/16/21 0838    ciprofloxacin (CIPRO) tablet 500 mg, 500 mg, Oral, Q24H, Nya Valdivia MD, 500 mg at 05/16/21 0596    citalopram (CeleXA) tablet 20 mg, 20 mg, Oral, Daily, Mateo Zayas DO, 20 mg at 05/16/21 8093    docusate sodium (COLACE) capsule 100 mg, 100 mg, Oral, BID, Beverley Murcia PA-C, 100 mg at 05/16/21 8030    fentaNYL (SUBLIMAZE) injection 25 mcg, 25 mcg, Intravenous, Q5 Min PRN, Jorge Alberto Campos Heydt, CRNA    iron polysaccharides (FERREX) capsule 150 mg, 150 mg, Oral, Daily, Stevie Kiser MD    levothyroxine tablet 75 mcg, 75 mcg, Oral, Early Morning, Pam Arabia, DO, 75 mcg at 05/16/21 0630    melatonin tablet 3 mg, 3 mg, Oral, HS, Pam Arabia, DO, 3 mg at 05/15/21 2137    metoprolol tartrate (LOPRESSOR) tablet 25 mg, 25 mg, Oral, BID, Pam Arabia, DO, 25 mg at 05/16/21 0838    ondansetron (ZOFRAN) injection 4 mg, 4 mg, Intravenous, Once PRN, Andrew Velasquez CRNA    oxyCODONE (ROXICODONE) IR tablet 2 5 mg, 2 5 mg, Oral, Q4H PRN, Roberta Abad PA-C, 2 5 mg at 05/14/21 1651    polyethylene glycol (MIRALAX) packet 17 g, 17 g, Oral, BID, Beverley Murcia PA-C, 17 g at 05/15/21 0850    ruxolitinib (JAKAFI) tablet 10 mg, 10 mg, Oral, Daily, Beatrice Vital MD, 10 mg at 05/16/21 4515    saccharomyces boulardii (FLORASTOR) capsule 250 mg, 250 mg, Oral, Daily, Pam Arabia, DO, 250 mg at 05/16/21 0322    simethicone (MYLICON) chewable tablet 80 mg, 80 mg, Oral, Q6H PRN, Laurita Ascencio PA-C, 80 mg at 05/11/21 1305    sodium bicarbonate tablet 1,300 mg, 1,300 mg, Oral, BID after meals, Mahsa Nicolas MD, 1,300 mg at 05/16/21 5951    Laboratory Results:  Results from last 7 days   Lab Units 05/16/21  0657 05/15/21  0444 05/14/21  0511 05/13/21  0627 05/13/21  0456 05/12/21  0443 05/11/21  0441 05/10/21  0511   WBC Thousand/uL 7 62 7 21 5 66 5 61  --  6 06 8 71 5 17   HEMOGLOBIN g/dL 8 1* 8 0* 8 1* 8 3*  --  7 3* 7 5* 8 0*   HEMATOCRIT % 26 0* 25 6* 27 2* 26 5*  --  23 4* 24 7* 26 1*   PLATELETS Thousands/uL 379 345 366 317  --  331 353 347   SODIUM mmol/L 142 140 140  --  139 136 139 139   POTASSIUM mmol/L 4 4 4 3 4 6  --  4 3 4 5 4 7 4 5   CHLORIDE mmol/L 112* 110* 111*  --  112* 109* 112* 112*   CO2 mmol/L 24 24 23  --  18* 20* 19* 20*   BUN mg/dL 51* 55* 56*  --  55* 44* 38* 44*   CREATININE mg/dL 3 37* 3 38* 3 46*  --  3 70* 3 66* 3 62* 3 52*   CALCIUM mg/dL 8 2* 8 5 8 9  --  8 7 8 6 8 3 8 2* MAGNESIUM mg/dL  --   --   --   --   --  1 9  --   --    PHOSPHORUS mg/dL  --   --   --   --   --  3 5  --   --        IR nephrostomy to nephroureteral stent   Final Result by Blanca Christianson MD (05/15 1207)      1  The left PCN was mildly retracted  2  Successful conversion of left PCN to PCNU using 8 5 Peruvian 22 cm catheter  Plan:       Consider converting the left PCNU catheter into retrograde nephroureteral stent during next catheter exchange    _______________________________________________________________      PROCEDURE SUMMARY   - Antegrade nephrostogram(s) via the existing access    - Left PCN to PCNU catheter conversion       PROCEDURE DETAILS:      Pre-procedure   Consent: Existing informed consent was utilized and time-out was performed prior to the procedure  Preparation: The site was prepared and draped using maximal sterile barrier technique including cutaneous antisepsis  Left PCN to PCNU catheter conversion   Local anesthesia was administered  Initial nephrostogram was performed  The existing PCN was noted to be mildly retracted into a calyx  The existing PCN was exchanged for an 8 5 Peruvian 22 cm PCNU catheter over a stiff Amplatz wire  Contrast was injected    through the new catheter to confirm proper positioning  Contrast   Contrast agent: Omnipaque 350   Contrast volume (mL): 10      Radiation Dose   Fluoroscopy time (minutes): 5 2     Reference air kerma (mGy): 40 123    Kerma area product (Gy-cm2): 4 3       Additional Details   Specimens removed: Existing PCN catheter   Estimated blood loss (mL): None   Standardized report: [email or web address]      Attestation   Signer name: [facility]   I attest that I was present for the entire procedure  I reviewed the stored images and agree with the report as written  Workstation performed: [id number]         IR nephrostomy tube check/change/reposition/reinsertion/upsize   Final Result by Blanca Christianson MD (05/14 2899)      1  The existing left PCN appears mildly retracted  2  The left PCN was exchanged for a new catheter using 8 Mongolian catheter  Plan:       Return in 2 months for routine left PCN exchange as previously scheduled  _______________________________________________________________      PROCEDURE SUMMARY   - Antegrade nephrostogram(s) via the existing access    - Left PCN exchange       PROCEDURE DETAILS:      Pre-procedure   Consent: Existing informed consent was utilized and time-out was performed prior to the procedure  Preparation: The site was prepared and draped using maximal sterile barrier technique including cutaneous antisepsis  Left genitourinary catheter exchange   Initial nephrostogram was performed  The existing catheter was removed over a Glidewire  A new 8 Mongolian catheter was advanced over the wire with pigtail loop forming within the renal collecting system  Contrast was injected through the new catheter under    fluoroscopy to confirm proper positioning  Catheter was secured to skin using 2-0 Prolene suture and connected to gravity drainage  Contrast   Contrast agent: Omnipaque 300   Contrast volume (mL): 10      Radiation Dose   Fluoroscopy time (minutes): 3 6     Reference air kerma (mGy): 109       Additional Details   Specimens removed: Existing left PCN catheter   Estimated blood loss (mL): None   Standardized report: [email or web address]      Attestation   Signer name: [facility]   I attest that I was present for the entire procedure  I reviewed the stored images and agree with the report as written  Workstation performed: [id number]         IR nephrostomy tube placement   Final Result by Lela Worley MD (05/11 2143)   Impression:    Ultrasound and fluoroscopically guided placement of a left 8 Mongolian percutaneous nephrostomy        Bloody purulent material in the collecting system      Renal atrophy with distortion of the left renal collecting system and suspicion for distal ureteral stricture         Workstation performed: [id number]         XR knee 4+ views left injury   Final Result by Bogdan Marlow MD (05/09 4178)      No acute osseous abnormality  Degenerative changes as described  Workstation performed: [id number]         CT abdomen pelvis wo contrast   Final Result by Ismael Awad MD (05/08 1332)      New left hydronephrosis with perinephric stranding suggests the possibility of more acute obstruction and/or pyelonephritis  The degree of dilatation on the left is more similar to a study from August 2020  There is also dilated ureter extending to    surgical sutures in the region of the ileal conduit suggesting possible stricture  No obvious stone is seen distally  Urologic evaluation is advised  Nonobstructing stones are seen in the right kidney  Diffuse dilatation of large and small bowel suggest ileus, more likely than obstruction, although the degree of small bowel dilatation is somewhat improved  Follow-up is suggested  Cyst is again noted in the right lower quadrant measuring up to 8 cm in size  This was noted as far back as 2015 although has slightly enlarged  Nonemergent focused ultrasound may be useful  The study was marked in [facility] for immediate notification  Workstation performed: [id number]         IR nephrostomy tube check/change/reposition/reinsertion/upsize    (Results Pending)       Portions of the record may have been created with voice recognition software  Occasional wrong word or "sound a like" substitutions may have occurred due to the inherent limitations of voice recognition software  Read the chart carefully and recognize, using context, where substitutions have occurred

## 2021-05-16 NOTE — ASSESSMENT & PLAN NOTE
Incidentally noted on CT scan  Slightly enlarged as compared to CT scan in 2015  Consider obtaining Non emergent focused ultrasound outpatient

## 2021-05-16 NOTE — ASSESSMENT & PLAN NOTE
· CT scan on admission suggested ileus  · Now patient is tolerating diet well and having regular bowel movements

## 2021-05-16 NOTE — ASSESSMENT & PLAN NOTE
Anemia noted--likely 2/2 hematuria coupled with anemia of CKD at baseline (baseline around 9-10)  s/p unit of PRBC on 5/12   Now hemoglobin stable between 8-9  · No JENELLE due to PE hx  · Will avoid IV iron as well given ? infection  · Oral iron started

## 2021-05-16 NOTE — PLAN OF CARE
Problem: Potential for Falls  Goal: Patient will remain free of falls  Description: INTERVENTIONS:  - Assess patient frequently for physical needs  -  Identify cognitive and physical deficits and behaviors that affect risk of falls    -  [place] fall precautions as indicated by assessment   - Educate patient/family on patient safety including physical limitations  - Instruct patient to call for assistance with activity based on assessment  - Modify environment to reduce risk of injury  - Consider OT/PT consult to assist with strengthening/mobility  Outcome: Progressing

## 2021-05-17 ENCOUNTER — TELEPHONE (OUTPATIENT)
Dept: UROLOGY | Facility: AMBULATORY SURGERY CENTER | Age: 76
End: 2021-05-17

## 2021-05-17 ENCOUNTER — OFFICE VISIT (OUTPATIENT)
Dept: NEPHROLOGY | Facility: CLINIC | Age: 76
End: 2021-05-17
Payer: COMMERCIAL

## 2021-05-17 ENCOUNTER — HOSPITAL ENCOUNTER (EMERGENCY)
Facility: HOSPITAL | Age: 76
Discharge: HOME | End: 2021-05-17
Attending: EMERGENCY MEDICINE | Admitting: INTERNAL MEDICINE
Payer: COMMERCIAL

## 2021-05-17 VITALS
WEIGHT: 195 LBS | OXYGEN SATURATION: 99 % | BODY MASS INDEX: 38.28 KG/M2 | HEIGHT: 60 IN | HEART RATE: 67 BPM | RESPIRATION RATE: 18 BRPM | DIASTOLIC BLOOD PRESSURE: 79 MMHG | TEMPERATURE: 97.8 F | SYSTOLIC BLOOD PRESSURE: 146 MMHG

## 2021-05-17 VITALS
HEART RATE: 74 BPM | DIASTOLIC BLOOD PRESSURE: 80 MMHG | BODY MASS INDEX: 38.44 KG/M2 | SYSTOLIC BLOOD PRESSURE: 122 MMHG | WEIGHT: 195.8 LBS | HEIGHT: 60 IN

## 2021-05-17 DIAGNOSIS — R31.0 GROSS HEMATURIA: Primary | ICD-10-CM

## 2021-05-17 DIAGNOSIS — N13.9 OBSTRUCTIVE UROPATHY: ICD-10-CM

## 2021-05-17 DIAGNOSIS — N18.5 ANEMIA IN STAGE 5 CHRONIC KIDNEY DISEASE, NOT ON CHRONIC DIALYSIS (HCC): ICD-10-CM

## 2021-05-17 DIAGNOSIS — E78.5 HYPERLIPIDEMIA, UNSPECIFIED HYPERLIPIDEMIA TYPE: ICD-10-CM

## 2021-05-17 DIAGNOSIS — R31.0 GROSS HEMATURIA: ICD-10-CM

## 2021-05-17 DIAGNOSIS — E66.01 OBESITY, MORBID (HCC): ICD-10-CM

## 2021-05-17 DIAGNOSIS — I10 ESSENTIAL HYPERTENSION: ICD-10-CM

## 2021-05-17 DIAGNOSIS — N13.30 HYDRONEPHROSIS OF LEFT KIDNEY: ICD-10-CM

## 2021-05-17 DIAGNOSIS — N25.81 SECONDARY HYPERPARATHYROIDISM OF RENAL ORIGIN (HCC): ICD-10-CM

## 2021-05-17 DIAGNOSIS — N18.5 STAGE 5 CHRONIC KIDNEY DISEASE NOT ON CHRONIC DIALYSIS (HCC): Primary | ICD-10-CM

## 2021-05-17 DIAGNOSIS — D63.1 ANEMIA IN STAGE 5 CHRONIC KIDNEY DISEASE, NOT ON CHRONIC DIALYSIS (HCC): ICD-10-CM

## 2021-05-17 LAB
ANION GAP SERPL CALCULATED.3IONS-SCNC: 4 MMOL/L (ref 4–13)
BACTERIA UR QL AUTO: ABNORMAL /HPF
BASOPHILS # BLD MANUAL: 0 THOUSAND/UL (ref 0–0.1)
BASOPHILS NFR MAR MANUAL: 0 % (ref 0–1)
BILIRUB UR QL STRIP: ABNORMAL
BUN SERPL-MCNC: 55 MG/DL (ref 5–25)
CALCIUM SERPL-MCNC: 8.8 MG/DL (ref 8.3–10.1)
CHLORIDE SERPL-SCNC: 110 MMOL/L (ref 100–108)
CLARITY UR: ABNORMAL
CO2 SERPL-SCNC: 27 MMOL/L (ref 21–32)
COLOR UR: ABNORMAL
CREAT SERPL-MCNC: 3.41 MG/DL (ref 0.6–1.3)
EOSINOPHIL # BLD MANUAL: 0.09 THOUSAND/UL (ref 0–0.4)
EOSINOPHIL NFR BLD MANUAL: 1 % (ref 0–6)
ERYTHROCYTE [DISTWIDTH] IN BLOOD BY AUTOMATED COUNT: 14.6 % (ref 11.6–15.1)
GFR SERPL CREATININE-BSD FRML MDRD: 13 ML/MIN/1.73SQ M
GLUCOSE SERPL-MCNC: 102 MG/DL (ref 65–140)
GLUCOSE UR STRIP-MCNC: ABNORMAL MG/DL
HCT VFR BLD AUTO: 27.6 % (ref 34.8–46.1)
HGB BLD-MCNC: 8.5 G/DL (ref 11.5–15.4)
HGB UR QL STRIP.AUTO: ABNORMAL
KETONES UR STRIP-MCNC: ABNORMAL MG/DL
LEUKOCYTE ESTERASE UR QL STRIP: ABNORMAL
LYMPHOCYTES # BLD AUTO: 0.96 THOUSAND/UL (ref 0.6–4.47)
LYMPHOCYTES # BLD AUTO: 11 % (ref 14–44)
MCH RBC QN AUTO: 29.2 PG (ref 26.8–34.3)
MCHC RBC AUTO-ENTMCNC: 30.8 G/DL (ref 31.4–37.4)
MCV RBC AUTO: 95 FL (ref 82–98)
MONOCYTES # BLD AUTO: 0.87 THOUSAND/UL (ref 0–1.22)
MONOCYTES NFR BLD: 10 % (ref 4–12)
MYELOCYTES NFR BLD MANUAL: 1 % (ref 0–1)
NEUTROPHILS # BLD MANUAL: 6.73 THOUSAND/UL (ref 1.85–7.62)
NEUTS SEG NFR BLD AUTO: 77 % (ref 43–75)
NITRITE UR QL STRIP: ABNORMAL
NON-SQ EPI CELLS URNS QL MICRO: ABNORMAL /HPF
NRBC BLD AUTO-RTO: 0 /100 WBCS
OVALOCYTES BLD QL SMEAR: PRESENT
PH UR STRIP.AUTO: ABNORMAL [PH]
PLATELET # BLD AUTO: 412 THOUSANDS/UL (ref 149–390)
PLATELET BLD QL SMEAR: ABNORMAL
PMV BLD AUTO: 9.6 FL (ref 8.9–12.7)
POIKILOCYTOSIS BLD QL SMEAR: PRESENT
POTASSIUM SERPL-SCNC: 4.3 MMOL/L (ref 3.5–5.3)
PROT UR STRIP-MCNC: ABNORMAL MG/DL
RBC # BLD AUTO: 2.91 MILLION/UL (ref 3.81–5.12)
RBC #/AREA URNS AUTO: ABNORMAL /HPF
RBC MORPH BLD: PRESENT
SODIUM SERPL-SCNC: 141 MMOL/L (ref 136–145)
SP GR UR STRIP.AUTO: 1.01 (ref 1–1.03)
TOTAL CELLS COUNTED SPEC: 100
UROBILINOGEN UR QL STRIP.AUTO: ABNORMAL E.U./DL
WBC # BLD AUTO: 8.74 THOUSAND/UL (ref 4.31–10.16)
WBC #/AREA URNS AUTO: ABNORMAL /HPF

## 2021-05-17 PROCEDURE — 81001 URINALYSIS AUTO W/SCOPE: CPT | Performed by: EMERGENCY MEDICINE

## 2021-05-17 PROCEDURE — 99285 EMERGENCY DEPT VISIT HI MDM: CPT | Performed by: EMERGENCY MEDICINE

## 2021-05-17 PROCEDURE — 85027 COMPLETE CBC AUTOMATED: CPT | Performed by: EMERGENCY MEDICINE

## 2021-05-17 PROCEDURE — 85007 BL SMEAR W/DIFF WBC COUNT: CPT | Performed by: EMERGENCY MEDICINE

## 2021-05-17 PROCEDURE — 99284 EMERGENCY DEPT VISIT MOD MDM: CPT | Performed by: UROLOGY

## 2021-05-17 PROCEDURE — 36415 COLL VENOUS BLD VENIPUNCTURE: CPT | Performed by: EMERGENCY MEDICINE

## 2021-05-17 PROCEDURE — 99283 EMERGENCY DEPT VISIT LOW MDM: CPT

## 2021-05-17 PROCEDURE — 99214 OFFICE O/P EST MOD 30 MIN: CPT | Performed by: INTERNAL MEDICINE

## 2021-05-17 PROCEDURE — 80048 BASIC METABOLIC PNL TOTAL CA: CPT | Performed by: EMERGENCY MEDICINE

## 2021-05-17 NOTE — PROGRESS NOTES
OFFICE FOLLOW UP - Nephrology   Mauricio Beach 77 y o  female MRN: [id number]       ASSESSMENT and PLAN:  Pooja Gong was seen today for follow-up and chronic kidney disease  Diagnoses and all orders for this visit:    Stage 5 chronic kidney disease not on chronic dialysis ([facility] )    Obstructive uropathy  -     Transfer to other facility    Secondary hyperparathyroidism of renal origin ([facility] )    Hyperlipidemia, unspecified hyperlipidemia type    Anemia in stage 5 chronic kidney disease, not on chronic dialysis [facility])    Essential hypertension    Hydronephrosis of left kidney    Obesity, morbid ([facility] )    Gross hematuria  -     Transfer to other facility        This is a 71-year-old lady with history of chronic kidney disease stage 5 with previous creatinine in the high 2 the low 3s, history of urinary incontinence, bilateral hydronephrosis secondary to obstructive uropathy and 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 in October 2016, currently hospitalized with gross hematuria, found to have a new left-sided hydronephrosis status post left PCN, patient was discharged yesterday  Today reports bloody urine return from left PCN  1  Chronic kidney disease stage 5 not on dialysis  In the setting of obstructive uropathy, functional solitary right kidney, episode of acute kidney injury  Renal function remains stable with creatinine in the low to mid 3, currently feeling tired but no any other significant uremic symptoms  Status post AV fistula creation on 01/05/2021 by Dr  Doctor  I would like to see her back in the office in 2-3 months with repeat blood and urine test   Once again discussed with patient about signs or symptoms to look for to contact us or to go to the emergency department    2  Hypertension, blood pressure acceptable, follow low-salt diet, no changes on medication at this moment       3  History of nonfunctional bladder causing bilateral hydronephrosis status in 2 months with repeat labs post supra trigonal cystectomy and ileal conduit 10/2016, continue follow-up with urologist   Recently new left-sided hydronephrosis status post left PCN, patient now with gross hematuria through PCN, advised to go to the ER for evaluation  4  Polycythemia area, previous history of PE, on Eliquis, continue follow-up with Hematology follow-up, Dr Patricia Diaz  Anemia of chronic disease  Most recent hemoglobin low at 8 1, will defer JENELLE to Hematology-Oncology    5  Mineral bone disease, PTH acceptable for her degree of kidney dysfunction, continue with Calcitrol 1 tablet 3 times a week (Monday, Wednesday, Friday)     6  Hyperlipidemia, on statins by Cardiology    7  Mild metabolic acidosis, back on sodium bicarbonate  8  History of PE, on Eliquis    9  Access, right brachiocephalic AV fistula created on 01/05/2021 by Dr Rashel Felipe Doctor  Continue follow-up with vascular surgery  Status post fistulogram with high-grade stenosis in the subclavian vein status post angioplasty on 02/10/2021        Patient Instructions   As we discussed in the office visit, given gross hematuria through the left percutaneous nephrostomy tube, advised to go to the emergency department for evaluation  No changes in your medications at this moment  I would like to see you back in the office in 2-3 months with repeat blood and urine test   Continue regular follow-up with vascular surgeon for your AV fistula  HPI: Anaya Fowler is a 77 y o  female who is here for Follow-up and Chronic Kidney Disease    Hospitalized in 10/2018 due to a small bowel obstruction that resolved spontaneously  Hospitalized early 01/2019 status post fall, found to have a left-sided subdural hematoma, creatinine on admission 3 0 that decreased to 2 5 after intravenously fluids    Hospitalized 05/2019 after status post fall complicated with T-spine compression fracture, found to have sepsis secondary to UTI, also developed acute kidney injury on top of CKD, creatinine on admission was 3 68, renal function improved and creatinine went down to 2 59 on discharge day  Hospitalized 08/2020 with severe acute kidney injury in the setting of incarcerated ileal conduit parastomal hernia status post ex lap and parastomal hernia repair  Renal function improved with creatinine down to the low 3s  Status post right brachiocephalic AV fistula creation on 01/05/2021  Last time seen was 04/19/2021, today returns for follow up  Since last office visit she was hospitalized between 05/08-05/16 in [facility] due to gross hematuria, found to have new left hydronephrosis s/p left PCN  Discharged yesterday  Today in general is feeling tired, noticed blood urine return from left PCN  Denies any chest pain, no shortness of breath, she has some lower extremity edema  Denies any abdominal pain, no more nausea, no vomiting, no diarrhea  Notices ileal conduit urine output stable and clear  Denies any NSAID use  Most recent blood test on 05/16/2021 reviewed, renal function is stable with a creatinine at 3 37, hemoglobin 8 1, bicarb 24      ROS: All the systems were reviewed and were negative except as documented on the H&P        Allergies: Chlorhexidine    Medications:   Current Outpatient Medications:     acetaminophen (TYLENOL) 325 mg tablet, 650 mg every 6 hours as needed for mild pain or headache, Disp: 30 tablet, Rfl: 0    amoxicillin (AMOXIL) 500 mg capsule, Take 1 capsule (500 mg total) by mouth every 12 (twelve) hours for 7 days, Disp: 14 capsule, Rfl: 0    aspirin (ECOTRIN LOW STRENGTH) 81 mg EC tablet, Take 1 tablet (81 mg total) by mouth daily, Disp: 90 tablet, Rfl: 4    atorvastatin (LIPITOR) 40 mg tablet, Take 1 tablet (40 mg total) by mouth daily (Patient taking differently: Take 40 mg by mouth daily at bedtime ), Disp: 90 tablet, Rfl: 6    calcitriol (ROCALTROL) 0 25 mcg capsule, TAKE 1 CAPSULE BY MOUTH EVERY OTHER DAY, Disp: 15 capsule, Rfl: 5    Cholecalciferol (VITAMIN D3) 1000 UNITS CAPS, Take 1,000 unit marking on U-100 syringe by mouth daily  , Disp: , Rfl:     ciprofloxacin (CIPRO) 500 mg tablet, Take 1 tablet (500 mg total) by mouth every 24 hours for 1 day, Disp: 1 tablet, Rfl: 0    citalopram (CeleXA) 20 mg tablet, Take 20 mg by mouth daily , Disp: , Rfl:     docusate sodium (COLACE) 100 mg capsule, Take 1 capsule (100 mg total) by mouth 2 (two) times a day, Disp: 10 capsule, Rfl: 0    Eliquis 2 5 MG, TAKE 1 TABLET BY MOUTH TWICE A DAY, Disp: 60 tablet, Rfl: 5    iron polysaccharides (FERREX) 150 mg capsule, Take 1 capsule (150 mg total) by mouth daily, Disp: 30 capsule, Rfl: 0    levothyroxine 75 mcg tablet, Take 75 mcg by mouth daily, Disp: , Rfl: 3    loperamide (IMODIUM) 2 mg capsule, Take 1 capsule (2 mg total) by mouth 4 (four) times a day as needed for diarrhea, Disp: 12 capsule, Rfl: 0    melatonin 3 mg, Take 1 tablet (3 mg total) by mouth daily at bedtime, Disp: 30 tablet, Rfl: 0    metoprolol tartrate (LOPRESSOR) 25 mg tablet, Take 1 tablet (25 mg total) by mouth 2 (two) times a day, Disp: 60 tablet, Rfl: 0    polyethylene glycol (MIRALAX) 17 g packet, Take 17 g by mouth daily as needed (constipation), Disp: 10 each, Rfl: 0    ruxolitinib (Jakafi) 10 mg tablet, Take 1 tablet (10 mg total) by mouth daily, Disp: 30 tablet, Rfl: 3    saccharomyces boulardii (FLORASTOR) 250 mg capsule, Take 1 capsule by mouth daily  , Disp: , Rfl:     sodium bicarbonate 650 mg tablet, Take 2 tablets (1,300 mg total) by mouth 2 (two) times daily after meals, Disp: 60 tablet, Rfl: 0    sodium chloride, PF, 0 9 %, 10 mL by Intracatheter route daily Intracatheter flushing daily, Disp: 300 mL, Rfl: 0  No current facility-administered medications for this visit       Past Medical History:   Diagnosis Date    Anxiety     Bowel obstruction (HCC)     Chronic [facility]CKD[facility] ) stage IV    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    Circulation problem     Compression fracture of cervical spine (HCC)     Hernia of abdominal cavity     History of kidney problems     Hydronephrosis     Hypertension     IBS (irritable bowel syndrome)     Incontinence     Lung mass     Improving on PET/CT 1/2016    Polycy[facility]mia vera [facility] )     Pulmonary embolism ([facility] ) 2014    Shingles     Urinary tract infection      Past Surgical History:   Procedure Laterality Date    ABDOMINAL ADHESION SURGERY N/A 8/9/2020    Procedure: LYSIS ADHESIONS;  Surgeon: Mary Rudd DO;  [facility];  Service: General    BLADDER SUSPENSION      BOTOX INJECTION N/A 7/27/2016    Procedure: BOTOX INJECTION ;  Surgeon: Dawna Gleason MD;  [facility];  Service:    [address], RADICAL WITH ILEOCONDUIT N/A 10/4/2016    Procedure: Rosendo Prom WITH ILEAL CONDUIT ;  Surgeon: Dawna Gleason MD;  Location[facility];  Service:    [place] W/ RETROGRADES Bilateral 7/27/2016    Procedure: Misha Grant; RETROGRADE PYELOGRAM ;  Surgeon: Dawna Gleason MD;  [facility];  Service:     HERNIA REPAIR      IR AV FISTULAGRAM/GRAFTOGRAM  2/10/2021    IR NEPHROSTOMY TO NEPHROURETERAL STENT  5/15/2021    IR NEPHROSTOMY TUBE CHECK/CHANGE/REPOSITION/REINSERTION/UPSIZE  5/14/2021    IR NEPHROSTOMY TUBE PLACEMENT  5/10/2021    IR NON-TUNNELED CENTRAL LINE PLACEMENT  7/17/2020    IR NON-TUNNELED CENTRAL LINE PLACEMENT  8/14/2020    LAPAROTOMY N/A 8/9/2020    Procedure: LAPAROTOMY EXPLORATORY, PARASTOMAL HERNIA REPAIR WITH MESH;  Surgeon: Mary Rudd DO;  [facility];  Service: General    FL ANASTOMOSIS,AV,ANY SITE Right 1/5/2021    Procedure: Creation of right brachiobasilic fistula;   Surgeon: Latesha Chairez MD;  [facility];  Service: Vascular    FL COLONOSCOPY FLX DX W/COLLJ SPEC WHEN PFRMD N/A 8/31/2016    Procedure: COLONOSCOPY;  Surgeon: Prabhakar Valenzuela MD;  Location: [facility]; Service: Gastroenterology    SD CYSTOSCOPY,INSERT URETERAL STENT Bilateral 7/27/2016    Procedure: STENT INSERTION; EXCISION OF MESH ;  Surgeon: Chasity Connell MD;  [facility];  Service: Urology    TONSILLECTOMY      TUBAL LIGATION      WISDOM TOOTH EXTRACTION       Family History   Problem Relation Age of Onset    Cancer Mother         small cell cancer     Heart disease Father     COPD Father     Heart disease Brother     Nephrolithiasis Brother       reports that she has never smoked  She has never used smokeless tobacco  She reports previous alcohol use  She reports previous drug use  Physical Exam:   Vitals:    05/17/21 0855 05/17/21 0909   BP:  122/80   BP Location:  Left arm   Patient Position:  Sitting   Cuff Size:  Standard   Pulse:  74   Weight: 88 8 kg (195 lb 12 8 oz)    Height: 5' (1 524 m)      Body mass index is 38 24 kg/m²      General: conscious, cooperative, in not acute distress  Eyes: conjunctivae pale, anicteric sclerae  ENT: lips and mucous membranes moist  Neck: supple, no JVD  Chest: clear breath sounds bilateral, no crackles, ronchus or wheezings  CVS: distinct S1 & S2, normal rate, regular rhythm  Abdomen: soft, non-tender, non-distended, normoactive bowel sounds, ileo-conduit over RLQ with clear urine  Back: no CVA tenderness, left PCN with bloody urine return  Extremities: trace to 1+ edema of both legs, right UE AVF + thrill and bruit  Skin: no rash  Neuro: awake, alert, oriented        Laboratory Results:  Lab Results   Component Value Date    WBC 7 62 05/16/2021    HGB 8 1 (L) 05/16/2021    HCT 26 0 (L) 05/16/2021    MCV 95 05/16/2021     05/16/2021     Lab Results   Component Value Date    SODIUM 142 05/16/2021    K 4 4 05/16/2021     (H) 05/16/2021    CO2 24 05/16/2021    BUN 51 (H) 05/16/2021    CREATININE 3 37 (H) 05/16/2021    GLUC 95 05/16/2021    CALCIUM 8 2 (L) 05/16/2021       Lab Results   Component Value Date     9 (H) 04/15/2021    CALCIUM 8 2 (L) 05/16/2021    PHOS 3 5 05/12/2021             Portions of the record may have been created with voice recognition software  Occasional wrong word or "sound a like" substitutions may have occurred due to the inherent limitations of voice recognition software  Read the chart carefully and recognize, using context, where substitutions have occurred  If you have any questions, please contact the dictating provider

## 2021-05-17 NOTE — CONSULTS
UROLOGY CONSULTATION NOTE     Patient Identifiers: Darling White (MRN [id number])  Service Requesting Consultation:  [facility]  Service Providing Consultation:  Urology, Crystal Choi MD    Date of Service: 5/17/2021  Consults    Reason for Consultation:  Hematuria    History of Present Illness:     Darling White is a 77 y o  old with a history of urinary diversion, super trigonal cystectomy, with ileal conduit and hernia status post repair, status post nephrostomy tube placement for pyelonephritis  She was referred in by her Nephrology physician For evaluation given some gross hematuria in her drainage line  She is hemodynamically stable at this time, the urine is clear pink in her nephrostomy bag at this time, she does not have hematuria in her ileal conduit at this time  I counseled her today on her multiple risk factors for bleeding including use of anti-platelet agents, end-stage renal disease, and Eliquis use      The following portions of the patient's history were reviewed and updated as appropriate: allergies, current medications, past family history, past medical history, past social history, past surgical history and problem list     Past Medical, Past Surgical History:     Past Medical History:   Diagnosis Date    Anxiety     Bowel obstruction ([address] )     Chronic kidney disease (CKD), [facility] IV (severe) ([facility])     stage IV    Chronic thrombosis of subclavian vein ([facility])     right    Circulation problem     Compression fracture of cervical spine ([address] )     Hernia of abdominal cavity     History of kidney problems     Hydronephrosis     Hypertension     IBS (irritable bowel syndrome)     Incontinence     Lung mass     Improving on PET/CT 1/2016    Polycythemia vera ([address] )     Pulmonary embolism ([address] ) 2014    Shingles     Urinary tract infection    :    Past Surgical History:   Procedure Laterality Date    ABDOMINAL ADHESION SURGERY N/A 8/9/2020    Procedure: LYSIS ADHESIONS;  Surgeon: Zeferino Betancur DO;  [facility];  Service: General    BLADDER SUSPENSION      BOTOX INJECTION N/A 7/27/2016    Procedure: BOTOX INJECTION ;  Surgeon: Don Simon MD;  [facility];  Service:    [address], RADICAL WITH ILEOCONDUIT N/A 10/4/2016    Procedure: Graciela Sanchez WITH ILEAL CONDUIT ;  Surgeon: Don Simon MD;  [facility];  Service:    Carleene Martinet CYSTOSCOPY W/ RETROGRADES Bilateral 7/27/2016    Procedure: Harland Rubinstein; RETROGRADE PYELOGRAM ;  Surgeon: Don Simon MD;  [facility];  Service:     HERNIA REPAIR      IR AV FISTULAGRAM/GRAFTOGRAM  2/10/2021    IR NEPHROSTOMY TO NEPHROURETERAL STENT  5/15/2021    IR NEPHROSTOMY TUBE CHECK/CHANGE/REPOSITION/REINSERTION/UPSIZE  5/14/2021    IR NEPHROSTOMY TUBE PLACEMENT  5/10/2021    IR NON-TUNNELED CENTRAL LINE PLACEMENT  7/17/2020    IR NON-TUNNELED CENTRAL LINE PLACEMENT  8/14/2020    LAPAROTOMY N/A 8/9/2020    Procedure: LAPAROTOMY EXPLORATORY, PARASTOMAL HERNIA REPAIR WITH MESH;  Surgeon: Zeferino Betancur DO;  [facility];  Service: General    MO ANASTOMOSIS,AV,ANY SITE Right 1/5/2021    Procedure: Creation of right brachiobasilic fistula; Surgeon: Tiago Chairez MD;  [facility];  Service: Vascular    MO COLONOSCOPY FLX DX W/COLLJ SPEC WHEN PFRMD N/A 8/31/2016    Procedure: COLONOSCOPY;  Surgeon: Jaime Armijo MD;  [facility]; Service: Gastroenterology    MO CYSTOSCOPY,INSERT URETERAL STENT Bilateral 7/27/2016    Procedure: STENT INSERTION; EXCISION OF MESH ;  Surgeon: Don Simon MD;  [facility];  Service: Urology    TONSILLECTOMY      TUBAL LIGATION      WISDOM TOOTH EXTRACTION     :    Medications, Allergies:     No current facility-administered medications for this encounter  Allergies:   Allergies   Allergen Reactions    Chlorhexidine Rash     petichi like rash when using chlorhexidine swabs prior to IV     :    Social and Family History:   Social History:   Social History     Tobacco Use    Smoking status: Never Smoker    Smokeless tobacco: Never Used    Tobacco comment: n/a   Substance Use Topics    Alcohol use: Not Currently     Frequency: Never     Binge frequency: Never     Comment: n/s    Drug use: Not Currently     Comment: n/a     Social History     Tobacco Use   Smoking Status Never Smoker   Smokeless Tobacco Never Used   Tobacco Comment    n/a       Family History:  Family History   Problem Relation Age of Onset    Cancer Mother         small cell cancer     Heart disease Father     COPD Father     Heart disease Brother     Nephrolithiasis Brother    :     Review of Systems:     General: Fever, chills, or night sweats: negative  Cardiac: Negative for chest pain  Pulmonary: Negative for shortness of breath  Gastrointestinal: Abdominal pain negative  Nausea, vomiting, or diarrhea negative,  Genitourinary: See HPI above  Patient does have hematuria  All other systems were queried and were negative except as listed above in HPI and here in the ROS  Physical Exam:   /79   Pulse 67   Temp 97 8 °F (36 6 °C) (Tympanic)   Resp 18   Ht 5' (1 524 m)   Wt 88 5 kg (195 lb)   SpO2 99%   BMI 38 08 kg/m² Temp (24hrs), Av 8 °F (36 6 °C), Min:97 8 °F (36 6 °C), Max:97 8 °F (36 6 °C)  current; Temperature: 97 8 °F (36 6 °C)  No intake/output data recorded  General: Patient is pleasant and in NAD  Awake and alert    Cardiac: Peripheral edema: positive, peripheral pulses are present    Pulmonary: Non-labored breathing, speaking in full sentences, no wheezing, no coughing    Abdomen: Soft, non-tender, non-distended    obese, ileal conduit draining well, clear yellow urine      Neurological: Cranial nerves II-XII are intact, no sensory deficits, no neurological deficits    Musculoskeletal: Extremities are warm, ROM is limited    Psychiatric: The patient's train of thought is linear and logical, mood and affect are normal    Lymphatic: There is not adenopathy in the abdominal region  Skin:  Intact      Labs:     Lab Results   Component Value Date    HGB 8 5 (L) 05/17/2021    HCT 27 6 (L) 05/17/2021    WBC 8 74 05/17/2021     (H) 05/17/2021   ]    Lab Results   Component Value Date     12/17/2015    K 4 3 05/17/2021     (H) 05/17/2021    CO2 27 05/17/2021    BUN 55 (H) 05/17/2021    CREATININE 3 41 (H) 05/17/2021    CALCIUM 8 8 05/17/2021    GLUCOSE 138 10/04/2016   ]    Imaging:   I personally reviewed the images and report of the following studies, and reviewed them with the patient:    Previous CT scan reviewed showing hydronephrosis, this is prior to nephrostomy tube placement      ASSESSMENT:     77 y o  old female with  hematuria in her nephrostomy tube, this appears to be mild at this time  She does have end-stage renal disease which can cause platelet dysfunction, additionally she takes aspirin which decreases platelet function, and she is on Eliquis  These are all contributing factors to her intermittent hematuria  I would recommend coming off of her aspirin as she states that this is not taking for stents and she does not have a history of strokes  I did tell her to please talk to her prescribing provider with regard to the Eliquis, she thinks that this is prescribed due to blood clots  She may be a good candidate for a inferior vena cava filter given that her Eliquis is certainly likely to increase bleeding risk complications  She does have multiple bruises from minor trauma sustained through normal daily activities on multiple extremities        PLAN:     No indication for urologic intervention at this time  I recommend that the patient stop taking her aspirin given recurrent bleeding  She should talk to her prescribing provider about getting off of Eliquis      Should her bleeding progressed to worsening, life-threatening bleeding, I would recommend consideration of angiography with potential embolization versus CT angiogram   These are not advised at this time given the patient's very fragile kidney function    She is cleared for discharge to home from a urologic perspective  Portions of the above record have been created with voice recognition software  Occasional wrong word or "sound alike" substitution may have occurred due to the inherent limitations of voice recognition software  Read the chart carefully and recognize, using context, where substitution may have occurred  Thank you for allowing me to participate in this patients care  Please do not hesitate to call with any additional questions    Osiris Lebron MD

## 2021-05-17 NOTE — ED PROVIDER NOTES
History  Chief Complaint   Patient presents with    Blood in Urine     Pt states was discharged yesterday from hospital and is taking Eliquis again, Pty c/o blood in urine       Blood in Urine  This is a new problem  The problem has been gradually worsening since onset  She describes the hematuria as gross hematuria  Timing: Constant with urine production at ostomy site  She is experiencing no pain  She describes her urine color as dark red  Pertinent negatives include no abdominal pain, chills, fever, flank pain or vomiting  Prior to Admission Medications   Prescriptions Last Dose Informant Patient Reported? Taking? Cholecalciferol (VITAMIN D3) 1000 UNITS CAPS  Self Yes No   Sig: Take 1,000 unit marking on U-100 syringe by mouth daily     Eliquis 2 5 MG  Self No No   Sig: TAKE 1 TABLET BY MOUTH TWICE A DAY   acetaminophen (TYLENOL) 325 mg tablet  Self No No   Si mg every 6 hours as needed for mild pain or headache   amoxicillin (AMOXIL) 500 mg capsule   No No   Sig: Take 1 capsule (500 mg total) by mouth every 12 (twelve) hours for 7 days   aspirin (ECOTRIN LOW STRENGTH) 81 mg EC tablet  Self No No   Sig: Take 1 tablet (81 mg total) by mouth daily   atorvastatin (LIPITOR) 40 mg tablet  Self No No   Sig: Take 1 tablet (40 mg total) by mouth daily   Patient taking differently: Take 40 mg by mouth daily at bedtime    calcitriol (ROCALTROL) 0 25 mcg capsule  Self No No   Sig: TAKE 1 CAPSULE BY MOUTH EVERY OTHER DAY   ciprofloxacin (CIPRO) 500 mg tablet   No No   Sig: Take 1 tablet (500 mg total) by mouth every 24 hours for 1 day   citalopram (CeleXA) 20 mg tablet  Self Yes No   Sig: Take 20 mg by mouth daily    docusate sodium (COLACE) 100 mg capsule   No No   Sig: Take 1 capsule (100 mg total) by mouth 2 (two) times a day   iron polysaccharides (FERREX) 150 mg capsule   No No   Sig: Take 1 capsule (150 mg total) by mouth daily   levothyroxine 75 mcg tablet  Self Yes No   Sig: Take 75 mcg by mouth daily loperamide (IMODIUM) 2 mg capsule  Self No No   Sig: Take 1 capsule (2 mg total) by mouth 4 (four) times a day as needed for diarrhea   melatonin 3 mg  Self No No   Sig: Take 1 tablet (3 mg total) by mouth daily at bedtime   metoprolol tartrate (LOPRESSOR) 25 mg tablet  Self No No   Sig: Take 1 tablet (25 mg total) by mouth 2 (two) times a day   polyethylene glycol (MIRALAX) 17 g packet   No No   Sig: Take 17 g by mouth daily as needed (constipation)   ruxolitinib (Jakafi) 10 mg tablet  Self No No   Sig: Take 1 tablet (10 mg total) by mouth daily   saccharomyces boulardii (FLORASTOR) 250 mg capsule  Self Yes No   Sig: Take 1 capsule by mouth daily     sodium bicarbonate 650 mg tablet   No No   Sig: Take 2 tablets (1,300 mg total) by mouth 2 (two) times daily after meals   sodium chloride, PF, 0 9 %   No No   Sig: 10 mL by Intracatheter route daily Intracatheter flushing daily      Facility-Administered Medications: None       Past Medical History:   Diagnosis Date    Anxiety     Bowel obstruction (HCC)     Chronic kidney disease (CKD), stage IV (severe) (HCC)     stage IV    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    Circulation problem     Compression fracture of cervical spine (HCC)     Hernia of abdominal cavity     History of kidney problems     Hydronephrosis     Hypertension     IBS (irritable bowel syndrome)     Incontinence     Lung mass     Improving on PET/CT 1/2016    Polycythemia vera (HCC)     Pulmonary embolism ([facility] [address] ) 2014    Shingles     Urinary tract infection        Past Surgical History:   Procedure Laterality Date    ABDOMINAL ADHESION SURGERY N/A 8/9/2020    Procedure: LYSIS ADHESIONS;  Surgeon: Di Medina DO;  [facility];  Service: General    BLADDER SUSPENSION      BOTOX INJECTION N/A 7/27/2016    Procedure: BOTOX INJECTION ;  Surgeon: Roscoe Bermeo MD;  [facility];  Service:     CHOLECYSTECTOMY N/A     COLONOSCOPY      CYSTECTOMY, RADICAL WITH ILEOCONDUIT N/A 10/4/2016    Procedure: SUPRATRIGONAL CYSTECTOMY WITH ILEAL CONDUIT ;  Surgeon: Jose Isaac MD;  [facility];  Service:    [facility] CYSTOSCOPY W/ RETROGRADES Bilateral 7/27/2016    Procedure: CYSTOSCOPY; RETROGRADE PYELOGRAM ;  Surgeon: Jose Isaac MD;  [facility];  Service:     HERNIA REPAIR      IR AV FISTULAGRAM/GRAFTOGRAM  2/10/2021    IR NEPHROSTOMY TO NEPHROURETERAL STENT  5/15/2021    IR NEPHROSTOMY TUBE CHECK/CHANGE/REPOSITION/REINSERTION/UPSIZE  5/14/2021    IR NEPHROSTOMY TUBE PLACEMENT  5/10/2021    IR NON-TUNNELED CENTRAL LINE PLACEMENT  7/17/2020    IR NON-TUNNELED CENTRAL LINE PLACEMENT  8/14/2020    LAPAROTOMY N/A 8/9/2020    Procedure: LAPAROTOMY EXPLORATORY, PARASTOMAL HERNIA REPAIR WITH MESH;  Surgeon: Irene Hill DO;  [facility];  Service: General    IN ANASTOMOSIS,AV,ANY SITE Right 1/5/2021    Procedure: Creation of right brachiobasilic fistula; Surgeon: Dianelys Chairez MD;  [facility];  Service: Vascular    IN COLONOSCOPY FLX DX W/COLLJ SPEC WHEN PFRMD N/A 8/31/2016    Procedure: COLONOSCOPY;  Surgeon: Gwen Aranda MD;  Location[facility]; Service: Gastroenterology    IN CYSTOSCOPY,INSERT URETERAL STENT Bilateral 7/27/2016    Procedure: STENT INSERTION; EXCISION OF MESH ;  Surgeon: Jose Isaac MD;  [facility];  Service: Urology    TONSILLECTOMY      TUBAL LIGATION      WISDOM TOOTH EXTRACTION         Family History   Problem Relation Age of Onset    Cancer Mother         small cell cancer     Heart disease Father     COPD Father     Heart disease Brother     Nephrolithiasis Brother      I have reviewed and agree with the history as documented      E-Cigarette/Vaping    E-Cigarette Use Never User      E-Cigarette/Vaping Substances    Nicotine No     THC No     CBD No     Flavoring No     Other No     Unknown No      Social History     Tobacco Use    Smoking status: Never Smoker    Smokeless tobacco: Never Used    Tobacco comment: n/a   Substance Use Topics    Alcohol use: Not Currently     Frequency: Never     Binge frequency: Never     Comment: n/s    Drug use: Not Currently     Comment: n/a        Review of Systems   Constitutional: Negative for chills and fever  Gastrointestinal: Negative for abdominal pain and vomiting  Genitourinary: Positive for hematuria  Negative for flank pain  Skin: Negative for wound  Neurological: Negative for light-headedness  All other systems reviewed and are negative  Physical Exam  ED Triage Vitals [05/17/21 1030]   Temperature Pulse Respirations Blood Pressure SpO2   97 8 °F (36 6 °C) 70 18 155/88 98 %      Temp Source Heart Rate Source Patient Position - Orthostatic VS BP Location FiO2 (%)   Tympanic Monitor Sitting Left arm --      Pain Score       No Pain             Orthostatic Vital Signs  Vitals:    05/17/21 1030 05/17/21 1238   BP: 155/88 146/79   Pulse: 70 67   Patient Position - Orthostatic VS: Sitting        Physical Exam  Vitals signs and nursing note reviewed  Constitutional:       General: She is not in acute distress  Appearance: She is well-developed  She is not diaphoretic  HENT:      Head: Normocephalic  Right Ear: External ear normal       Left Ear: External ear normal       Nose: Nose normal    Eyes:      Conjunctiva/sclera: Conjunctivae normal    Neck:      Musculoskeletal: Normal range of motion  Trachea: No tracheal deviation  Cardiovascular:      Rate and Rhythm: Normal rate  Pulmonary:      Effort: Pulmonary effort is normal  No respiratory distress  Abdominal:      General: There is no distension  Genitourinary:     Comments: Gross hematuria appreciated in urine collection bag of patient's ostomy  Musculoskeletal:         General: No deformity  Skin:     General: Skin is dry  Findings: No erythema or rash        Comments: No erythema appreciated at site of ostomy Neurological:      General: No focal deficit present  Mental Status: She is alert  Psychiatric:         Behavior: Behavior normal          ED Medications  Medications - No data to display    Diagnostic Studies  Results Reviewed     Procedure Component Value Units Date/Time    Manual Differential(PHLEBS Do Not Order) [[id number]]  (Abnormal) Collected: 05/17/21 1202    Lab Status: Final result Specimen: Blood from Arm, Left Updated: 05/17/21 1352     Segmented % 77 %      Lymphocytes % 11 %      Monocytes % 10 %      Eosinophils, % 1 %      Basophils % 0 %      Myelocytes % 1 %      Absolute Neutrophils 6 73 Thousand/uL      Lymphocytes Absolute 0 96 Thousand/uL      Monocytes Absolute 0 87 Thousand/uL      Eosinophils Absolute 0 09 Thousand/uL      Basophils Absolute 0 00 Thousand/uL      Total Counted 100     RBC Morphology Present     Ovalocytes Present     Poikilocytes Present     Platelet Estimate Increased    CBC and differential [[id number]]  (Abnormal) Collected: 05/17/21 1202    Lab Status: Final result Specimen: Blood from Arm, Left Updated: 05/17/21 1352     WBC 8 74 Thousand/uL      RBC 2 91 Million/uL      Hemoglobin 8 5 g/dL      Hematocrit 27 6 %      MCV 95 fL      MCH 29 2 pg      MCHC 30 8 g/dL      RDW 14 6 %      MPV 9 6 fL      Platelets 269 Thousands/uL      nRBC 0 /100 WBCs     Narrative: This is an appended report  These results have been appended to a previously verified report      Urine Microscopic [[id number]]  (Abnormal) Collected: 05/17/21 1210    Lab Status: Final result Specimen: Urine, Other Updated: 05/17/21 1306     RBC, UA Innumerable /hpf      WBC, UA 10-20 /hpf      Epithelial Cells Occasional /hpf      Bacteria, UA Occasional /hpf     UA (URINE) with reflex to Scope [[id number]]  (Abnormal) Collected: 05/17/21 1210    Lab Status: Final result Specimen: Urine, Other Updated: 05/17/21 1238     Color, UA Red     Clarity, UA Cloudy     Specific Gravity, UA 1 014     pH, UA Interference-unable to analyze     Leukocytes, UA Interference- unable to analyze     Nitrite, UA Interference- unable to analyze     Protein, UA       Interference- unable to analyze     mg/dl     Glucose, UA       Interference- unable to analyze     mg/dl     Ketones, UA       Interference- unable to analyze     mg/dl     Urobilinogen, UA       Interference-unable to analyze     E U /dl     Bilirubin, UA Interference- unable to analyze     Blood, UA Interference- unable to analyze    Basic metabolic panel [[id number]]  (Abnormal) Collected: 05/17/21 1202    Lab Status: Final result Specimen: Blood from Arm, Left Updated: 05/17/21 1236     Sodium 141 mmol/L      Potassium 4 3 mmol/L      Chloride 110 mmol/L      CO2 27 mmol/L      ANION GAP 4 mmol/L      BUN 55 mg/dL      Creatinine 3 41 mg/dL      Glucose 102 mg/dL      Calcium 8 8 mg/dL      eGFR 13 ml/min/1 73sq m     Narrative:      [place] guidelines for Chronic Kidney Disease (CKD):     Stage 1 with normal or high GFR (GFR > 90 mL/min/1 73 square meters)    Stage 2 Mild CKD (GFR = 60-89 mL/min/1 73 square meters)    Stage 3A Moderate CKD (GFR = 45-59 mL/min/1 73 square meters)    Stage 3B Moderate CKD (GFR = 30-44 mL/min/1 73 square meters)    Stage 4 Severe CKD (GFR = 15-29 mL/min/1 73 square meters)    Stage 5 End Stage CKD (GFR <15 mL/min/1 73 square meters)  Note: GFR calculation is accurate only with a steady state creatinine                 No orders to display         Procedures  Procedures      ED Course  ED Course as of May 18 1706   Mon May 17, 2021   1300 stable   Hemoglobin(!): 8 5   1300 stable   Creatinine(!): 3 41   1412 Urology Dr Jonny Terry evaluated patient at bedside, recommends that patient hold aspirin and continue Eliquis at this time, patient should discuss with her prescribing doctor regarding Eliquis if the bleeding continues    Recommends that patient does not require any further workup or intervention in the emergency department at this time and may follow-up in clinic  Identification of Seniors at Risk      Most Recent Value   (ISAR) Identification of Seniors at Risk   Before the illness or injury that brought you to the Emergency, did you need someone to help you on a regular basis? 0 Filed at: 05/17/2021 1032   In the last 24 hours, have you needed more help than usual?  0 Filed at: 05/17/2021 1032   Have you been hospitalized for one or more nights during the past 6 months? 1 Filed at: 05/17/2021 1032   In general, do you see well?  0 Filed at: 05/17/2021 1032   In general, do you have serious problems with your memory? 0 Filed at: 05/17/2021 1032   Do you take more than three different medications every day? 1 Filed at: 05/17/2021 1032   ISAR Score  2 Filed at: 05/17/2021 1032                              MDM  Number of Diagnoses or Management Options  Gross hematuria:   Diagnosis management comments: This is a 71-year-old female who is anticoagulated on Eliquis for a prior PE, history of CKD, patient also takes aspirin daily, patient has a history of an ileal conduit as well as recent admission for nephrostomy placement for hydronephrosis, patient now presents with hematuria that she reports has been gradually worsening, she states initially started as some redness to her urine and now she has had gross blood in her urine  She denies any pain  She has not had any fevers or chills  Ostomy sites unremarkable on exam, patient afebrile with stable vital signs, no distress, she is noted to have blood in her urine collection bag  Overall given complicated surgical history will consult urology for further evaluation  Will also check CBC to evaluate for acute blood loss anemia, will also obtain BMP for kidney function given recent history of obstruction         Amount and/or Complexity of Data Reviewed  Clinical lab tests: ordered and reviewed  Discuss the patient with other providers: yes    Risk of Complications, Morbidity, and/or Mortality  Presenting problems: high        Disposition  Final diagnoses:   Gross hematuria     Time reflects when diagnosis was documented in both MDM as applicable and the Disposition within this note     Time User Action Codes Description Comment    5/17/2021  2:14 PM Carol Payton Add [R31 0] Gross hematuria       ED Disposition     ED Disposition Condition Date/Time Comment    Discharge Stable Mon May 17, 2021  2:14 PM Bernarda Moody discharge to home/self care              Follow-up Information     Follow up With Specialties Details Why Contact Info Additional [address] Emergency Department Emergency Medicine Go to  If symptoms worsen [address]  [address] [facility] Emergency Department, [address], [facility], [address] [facility], [facility] 108    [facility] For Urology [facility] Urology Schedule an appointment as soon as possible for a visit   [address] [facility] [address]  [address] [place] [address] For Urology [facility], [address], [facility], [address] [facility], [address]    Ale Bello MD Internal Medicine Schedule an appointment as soon as possible for a visit   [address] [address]  [phone]             Discharge Medication List as of 5/17/2021  2:26 PM      CONTINUE these medications which have NOT CHANGED    Details   acetaminophen (TYLENOL) 325 mg tablet 650 mg every 6 hours as needed for mild pain or headache, Normal      amoxicillin (AMOXIL) 500 mg capsule Take 1 capsule (500 mg total) by mouth every 12 (twelve) hours for 7 days, Starting Sun 5/16/2021, Until Sun 5/23/2021, Normal      aspirin (ECOTRIN LOW STRENGTH) 81 mg EC tablet Take 1 tablet (81 mg total) by mouth daily, Starting Fri 12/11/2020, Normal      atorvastatin (LIPITOR) 40 mg tablet Take 1 tablet (40 mg total) by mouth daily, Starting Fri 12/11/2020, Normal      calcitriol (ROCALTROL) 0 25 mcg capsule TAKE 1 CAPSULE BY MOUTH EVERY OTHER DAY, Normal      Cholecalciferol (VITAMIN D3) 1000 UNITS CAPS Take 1,000 unit marking on U-100 syringe by mouth daily  , Historical Med      ciprofloxacin (CIPRO) 500 mg tablet Take 1 tablet (500 mg total) by mouth every 24 hours for 1 day, Starting Mon 5/17/2021, Until Tue 5/18/2021, Normal      citalopram (CeleXA) 20 mg tablet Take 20 mg by mouth daily , Starting Wed 5/30/2018, Historical Med      docusate sodium (COLACE) 100 mg capsule Take 1 capsule (100 mg total) by mouth 2 (two) times a day, Starting Sun 5/16/2021, Normal      Eliquis 2 5 MG TAKE 1 TABLET BY MOUTH TWICE A DAY, Normal      iron polysaccharides (FERREX) 150 mg capsule Take 1 capsule (150 mg total) by mouth daily, Starting Sun 5/16/2021, Normal      levothyroxine 75 mcg tablet Take 75 mcg by mouth daily, Starting Sun 2/11/2018, Historical Med      loperamide (IMODIUM) 2 mg capsule Take 1 capsule (2 mg total) by mouth 4 (four) times a day as needed for diarrhea, Starting Tue 7/14/2020, Print      melatonin 3 mg Take 1 tablet (3 mg total) by mouth daily at bedtime, Starting Sun 7/19/2020, Normal      metoprolol tartrate (LOPRESSOR) 25 mg tablet Take 1 tablet (25 mg total) by mouth 2 (two) times a day, Starting Sun 7/19/2020, Normal      polyethylene glycol (MIRALAX) 17 g packet Take 17 g by mouth daily as needed (constipation), Starting Sun 5/16/2021, Normal      ruxolitinib (Jakafi) 10 mg tablet Take 1 tablet (10 mg total) by mouth daily, Starting Tue 1/5/2021, Normal      saccharomyces boulardii (FLORASTOR) 250 mg capsule Take 1 capsule by mouth daily  , Historical Med      sodium bicarbonate 650 mg tablet Take 2 tablets (1,300 mg total) by mouth 2 (two) times daily after meals, Starting Sun 5/16/2021, Normal      sodium chloride, PF, 0 9 % 10 mL by Intracatheter route daily Intracatheter flushing daily, Starting Mon 5/10/2021, Until Wed 6/9/2021, Normal           No discharge procedures on file  PDMP Review       Value Time User    PDMP Reviewed  Yes 1/5/2021 12:26 Carlos Chairez MD           ED Provider  Attending physically available and evaluated Domingo Guthrie I managed the patient along with the ED Attending      Electronically Signed by         Benito Macdonald MD  05/18/21 9803 Marilyn Lester MD  05/18/21 2133

## 2021-05-17 NOTE — UTILIZATION REVIEW
Notification of Discharge   This is a Notification of Discharge from our facility [address]  Please be advised that this patient has been discharge from our facility  Below you will find the admission and discharge date and time including the patients disposition  UTILIZATION REVIEW CONTACT:  Nargis Estrada  Utilization   Network Utilization Review Department  Phone: [phone] x carefully listen to the prompts  All voicemails are confidential   Email: [email or web address]     PHYSICIAN ADVISORY SERVICES:  FOR [id number] REVIEW - MEDICAL NECESSITY DENIAL  Phone: [phone]  Fax: [phone]  Email: [email or web address]     PRESENTATION DATE: 5/8/2021 11:18 AM  OBERVATION ADMISSION DATE:   INPATIENT ADMISSION DATE: 5/8/21 1351   DISCHARGE DATE: 5/16/2021  3:48 PM  DISPOSITION: Home with [place] with [address] [place] [address] Road INFORMATION:  Send all requests for admission clinical reviews, approved or denied determinations and any other requests to dedicated fax number below belonging to the campus where the patient is receiving treatment   List of dedicated fax numbers:  [address] DENIALS (Administrative/Medical Necessity) [phone]   [address] (Maternity/NICU/Pediatrics) [phone]   Goldy Roagustina [phone]   Arby Fillers [phone]   Jethro Stack [phone]   [place] [address] [phone]   [facility]  [phone]   [address] [place] [address], [address]  [address] [facility] [address] [facility] [phone]

## 2021-05-17 NOTE — PATIENT INSTRUCTIONS
As we discussed in the office visit, given gross hematuria through the left percutaneous nephrostomy tube, advised to go to the emergency department for evaluation  No changes in your medications at this moment  I would like to see you back in the office in 2-3 months with repeat blood and urine test   Continue regular follow-up with vascular surgeon for your AV fistula

## 2021-05-17 NOTE — TELEPHONE ENCOUNTER
Working on Referral Workqueue, Pt of Dr Lynda Hseter being referred to Urology from Dr Gabbi Gonsalves, [facility] for N13 9 (ICD-10-CM) - Obstructive uropathy   Raphael Sampson Sh[facility]one Sampson Raphael Sampson Shoshone Sampson please advise how soon pt needs to be seen

## 2021-05-17 NOTE — H&P (VIEW-ONLY)
UROLOGY CONSULTATION NOTE     Patient Identifiers: Shonna Mirza (MRN [id number])  Service Requesting Consultation:  [facility]  Service Providing Consultation:  Urology, Virgil Alanis MD    Date of Service: 5/17/2021  Consults    Reason for Consultation:  Hematuria    History of Present Illness:     Shonna Mirza is a 77 y o  old with a history of urinary diversion, super trigonal cystectomy, with ileal conduit and hernia status post repair, status post nephrostomy tube placement for pyelonephritis  She was referred in by her Nephrology physician For evaluation given some gross hematuria in her drainage line  She is hemodynamically stable at this time, the urine is clear pink in her nephrostomy bag at this time, she does not have hematuria in her ileal conduit at this time  I counseled her today on her multiple risk factors for bleeding including use of anti-platelet agents, end-stage renal disease, and Eliquis use      The following portions of the patient's history were reviewed and updated as appropriate: allergies, current medications, past family history, past medical history, past social history, past surgical history and problem list     Past Medical, Past Surgical History:     Past Medical History:   Diagnosis Date    Anxiety     Bowel obstruction ([address] )     Chronic kidney disease (CKD), stage IV (severe) ([facility])     stage IV    Chronic thrombosis of subclavian vein ([facility])     right    Circulation problem     Compression fracture of cervical spine [facility] )     Hernia of abdominal cavity     History of kidney problems     Hydronephrosis     Hypertension     IBS (irritable bowel syndrome)     Incontinence     Lung mass     Improving on PET/CT 1/2016    Polycythemia vera ([address] )     Pulmonary embolism ([facility] ) 2014    Shingles     Urinary tract infection    :    Past Surgical History:   Procedure Laterality Date    ABDOMINAL ADHESION SURGERY N/A 8/9/2020    Procedure: LYSIS ADHESIONS;  Surgeon: Mary Rudd DO;  [facility];  Service: General    BLADDER SUSPENSION      BOTOX INJECTION N/A 7/27/2016    Procedure: BOTOX INJECTION ;  Surgeon: Dawna Gleason MD;  [facility];  Service:    Kaveh ashley 62, RADICAL WITH ILEOCONDUIT N/A 10/4/2016    Procedure: Rosendo Prom WITH ILEAL CONDUIT ;  Surgeon: Dawna Gleason MD;  [facility];  Service:    Madelin Pall CYSTOSCOPY W/ RETROGRADES Bilateral 7/27/2016    Procedure: Misha Grant; RETROGRADE PYELOGRAM ;  Surgeon: Dawna Gleason MD;  [facility];  Service:     HERNIA REPAIR      IR AV FISTULAGRAM/GRAFTOGRAM  2/10/2021    IR NEPHROSTOMY TO NEPHROURETERAL STENT  5/15/2021    IR NEPHROSTOMY TUBE CHECK/CHANGE/REPOSITION/REINSERTION/UPSIZE  5/14/2021    IR NEPHROSTOMY TUBE PLACEMENT  5/10/2021    IR NON-TUNNELED CENTRAL LINE PLACEMENT  7/17/2020    IR NON-TUNNELED CENTRAL LINE PLACEMENT  8/14/2020    LAPAROTOMY N/A 8/9/2020    Procedure: LAPAROTOMY EXPLORATORY, PARASTOMAL HERNIA REPAIR WITH MESH;  Surgeon: Mary Rudd DO;  [facility];  Service: General    MI ANASTOMOSIS,AV,ANY SITE Right 1/5/2021    Procedure: Creation of right brachiobasilic fistula; Surgeon: Latesha Chairez MD;  [facility];  Service: Vascular    MI COLONOSCOPY FLX DX W/COLLJ SPEC WHEN PFRMD N/A 8/31/2016    Procedure: COLONOSCOPY;  Surgeon: Gabby Bill MD;  Location: [facility]; Service: Gastroenterology    MI CYSTOSCOPY,INSERT URETERAL STENT Bilateral 7/27/2016    Procedure: STENT INSERTION; EXCISION OF MESH ;  Surgeon: Dawna Gleason MD;  [facility];  Service: Urology    TONSILLECTOMY      TUBAL LIGATION      WISDOM TOOTH EXTRACTION     :    Medications, Allergies:     No current facility-administered medications for this encounter  Allergies:   Allergies   Allergen Reactions    Chlorhexidine Rash     petichi like rash when using chlorhexidine swabs prior to IV     :    Social and Family History:   Social History:   Social History     Tobacco Use    Smoking status: Never Smoker    Smokeless tobacco: Never Used    Tobacco comment: n/a   Substance Use Topics    Alcohol use: Not Currently     Frequency: Never     Binge frequency: Never     Comment: n/s    Drug use: Not Currently     Comment: n/a     Social History     Tobacco Use   Smoking Status Never Smoker   Smokeless Tobacco Never Used   Tobacco Comment    n/a       Family History:  Family History   Problem Relation Age of Onset    Cancer Mother         small cell cancer     Heart disease Father     COPD Father     Heart disease Brother     Nephrolithiasis Brother    :     Review of Systems:     General: Fever, chills, or night sweats: negative  Cardiac: Negative for chest pain  Pulmonary: Negative for shortness of breath  Gastrointestinal: Abdominal pain negative  Nausea, vomiting, or diarrhea negative,  Genitourinary: See HPI above  Patient does have hematuria  All other systems were queried and were negative except as listed above in HPI and here in the ROS  Physical Exam:   /79   Pulse 67   Temp 97 8 °F (36 6 °C) (Tympanic)   Resp 18   Ht 5' (1 524 m)   Wt 88 5 kg (195 lb)   SpO2 99%   BMI 38 08 kg/m² Temp (24hrs), Av 8 °F (36 6 °C), Min:97 8 °F (36 6 °C), Max:97 8 °F (36 6 °C)  current; Temperature: 97 8 °F (36 6 °C)  No intake/output data recorded  General: Patient is pleasant and in NAD  Awake and alert    Cardiac: Peripheral edema: positive, peripheral pulses are present    Pulmonary: Non-labored breathing, speaking in full sentences, no wheezing, no coughing    Abdomen: Soft, non-tender, non-distended    obese, ileal conduit draining well, clear yellow urine      Neurological: Cranial nerves II-XII are intact, no sensory deficits, no neurological deficits    Musculoskeletal: Extremities are warm, ROM is limited    Psychiatric: The patient's train of thought is linear and logical, mood and affect are normal    Lymphatic: There is not adenopathy in the abdominal region  Skin:  Intact      Labs:     Lab Results   Component Value Date    HGB 8 5 (L) 05/17/2021    HCT 27 6 (L) 05/17/2021    WBC 8 74 05/17/2021     (H) 05/17/2021   ]    Lab Results   Component Value Date     12/17/2015    K 4 3 05/17/2021     (H) 05/17/2021    CO2 27 05/17/2021    BUN 55 (H) 05/17/2021    CREATININE 3 41 (H) 05/17/2021    CALCIUM 8 8 05/17/2021    GLUCOSE 138 10/04/2016   ]    Imaging:   I personally reviewed the images and report of the following studies, and reviewed them with the patient:    Previous CT scan reviewed showing hydronephrosis, this is prior to nephrostomy tube placement      ASSESSMENT:     77 y o  old female with  hematuria in her nephrostomy tube, this appears to be mild at this time  She does have end-stage renal disease which can cause platelet dysfunction, additionally she takes aspirin which decreases platelet function, and she is on Eliquis  These are all contributing factors to her intermittent hematuria  I would recommend coming off of her aspirin as she states that this is not taking for stents and she does not have a history of strokes  I did tell her to please talk to her prescribing provider with regard to the Eliquis, she thinks that this is prescribed due to blood clots  She may be a good candidate for a inferior vena cava filter given that her Eliquis is certainly likely to increase bleeding risk complications  She does have multiple bruises from minor trauma sustained through normal daily activities on multiple extremities        PLAN:     No indication for urologic intervention at this time  I recommend that the patient stop taking her aspirin given recurrent bleeding  She should talk to her prescribing provider about getting off of Eliquis      Should her bleeding progressed to worsening, life-threatening bleeding, I would recommend consideration of angiography with potential embolization versus CT angiogram   These are not advised at this time given the patient's very fragile kidney function    She is cleared for discharge to home from a urologic perspective  Portions of the above record have been created with voice recognition software  Occasional wrong word or "sound alike" substitution may have occurred due to the inherent limitations of voice recognition software  Read the chart carefully and recognize, using context, where substitution may have occurred  Thank you for allowing me to participate in this patients care  Please do not hesitate to call with any additional questions    Jose Ramon Lerner MD

## 2021-05-18 ENCOUNTER — TELEPHONE (OUTPATIENT)
Dept: OTHER | Facility: OTHER | Age: 76
End: 2021-05-18

## 2021-05-18 NOTE — TELEPHONE ENCOUNTER
India from [facility] called in regards to the patient's drain site  There was a moderate amount of drainage and it did drain when she flushed it  She said the patient has daily flushes and the patient will not be able to manage that alone  The nurse wants to know if there will continue to be daily flushes or will it be switched to every other day

## 2021-05-18 NOTE — TELEPHONE ENCOUNTER
Patient previously managed by Dr Nika Wright, last seen in the [place] office on 10/6/20  Pending annual follow up scheduled for 10/8/21  Patient with history of stage 5 chronic kidney disease, 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 elevated bladder pressures causing hydronephrosis and kidney failure  Patient recently admitted for gross hematuria x 2 weeks  CT on 5/8/21 showed new left sided hydronephrosis, suggesting possible acute obstruction or pyelonephritis, non obstructing stones in the right kidney  Patient was seen several times while in patient by urology  Left PCN was placed during admission  Plan to maintain percutaneous drainage for several weeks, may re-evaluated with anterograde study for possible internalization, will require chronic anterograde percutaneous drainage  Has pending IR appointment in July  Patient was also seen again yesterday in the ER, sent in by Nephrology due to gross hematuria in nephrostomy  Patient seen by Dr José Miguel Cardona at that time, who advised no current urologic needs  Please advise on timing for next urology appointment

## 2021-05-19 ENCOUNTER — TELEPHONE (OUTPATIENT)
Dept: HEMATOLOGY ONCOLOGY | Facility: CLINIC | Age: 76
End: 2021-05-19

## 2021-05-19 NOTE — TELEPHONE ENCOUNTER
India RODRIGUEZ [facility] calling in regards to previous message,she is also questioning if pt was to continue Cipro as she was not given script time of discharge,david contact her [phone]

## 2021-05-19 NOTE — TELEPHONE ENCOUNTER
Left detailed message letting India know these concerns need to be addressed with Urology  We do not do Ostomy surgeries  I am sending this to Urology pool

## 2021-05-19 NOTE — TELEPHONE ENCOUNTER
Pt will not need to continue Cipro as she was given medications inpatient and 1 additional dose after discharge

## 2021-05-19 NOTE — TELEPHONE ENCOUNTER
Reviewed with Dr Nasrin Carmen and he wants the patient to take eliquis 2 5 mg daily instead of twice daily  Called the patient to review and she verbalized understanding

## 2021-05-19 NOTE — TELEPHONE ENCOUNTER
Patient calling to report that she had a nephrostomy placed about a week and a half ago  Patients Eliquis was put on hold prior to the procedure  She restarted medication on her own when she was discharged from the hospital on Sunday 5/16/21  Patient reports that she stopped Eliquis Monday morning because she had blood in her urine  Patient took her last dose of Eliquis Monday am - 5/17/21  She is unsure what she should do in regards to taking Eliquis    She is worried about the bleeding she was having in her urine  Per patient she spoke with urology about Eliquis and was told to reach out to Dr Pascual Wells office    Patient will reach out to cardiology about baby ASA as they are recommending this for patient     Will send to RN to further discuss with Dr Caty Hancock  Call back number for patient [phone]

## 2021-05-20 ENCOUNTER — DOCUMENTATION (OUTPATIENT)
Dept: SOCIAL WORK | Facility: HOSPITAL | Age: 76
End: 2021-05-20

## 2021-05-20 ENCOUNTER — TELEPHONE (OUTPATIENT)
Dept: UROLOGY | Facility: AMBULATORY SURGERY CENTER | Age: 76
End: 2021-05-20

## 2021-05-20 ENCOUNTER — TELEPHONE (OUTPATIENT)
Dept: CARDIOLOGY CLINIC | Facility: CLINIC | Age: 76
End: 2021-05-20

## 2021-05-20 DIAGNOSIS — N13.30 HYDRONEPHROSIS, UNSPECIFIED HYDRONEPHROSIS TYPE: ICD-10-CM

## 2021-05-20 RX ORDER — SODIUM CHLORIDE 9 MG/ML
10 INJECTION INTRAVENOUS DAILY
Qty: 300 ML | Refills: 0 | Status: SHIPPED | OUTPATIENT
Start: 2021-05-20 | End: 2021-07-15

## 2021-05-20 NOTE — TELEPHONE ENCOUNTER
Devon nurse from [facility] calling for normal saline solution flush 10 ml daily script to be sent to [facility] in [place]  She only uses [facility] for her flushes because [facility] does not carry  She is requesting it to be sent by tomorrow because of transportation

## 2021-05-20 NOTE — PROGRESS NOTES
Admission Report at [facility] Sent to ordering MD Urology and fax to PCP    [facility] has admitted your patient to [address] [place] [address] service with the following disciplines:      SN  This report is informational only, no responses is needed  Primary focus of home health care:  assess  Patient stated goals of care: improve strength and independence  Anticipated visit pattern daily 60 days and next visit date 5 21 21 CBCD and PNC flush  Significant clinical findings:  Fine crackles right lower lobe  Educated on deep breathing ten times per hour  Reports anxiety and depression daily for last 2 weeks  Patients Normal Saline 10 ml flushes not carried by patients pharmacy  Please send script to [address] in [place] so patient can  after PCP apt tomorrow  Needs follow up physician appointments scheduled: Urology TBS Cardiology TBS Nephrology TBS  Potential barriers to goal achievement: No assistance at home, son is autistic and unable to assist   Anxiety Depression Missing iron and patients pharmacy unable to fill NSS script  Thank you for allowing us to participate in the care of your patient        Lenny Green RN

## 2021-05-20 NOTE — TELEPHONE ENCOUNTER
Pt held Eliquis and ASA for gross hematuria for a few days  Per hematology pt restarted Eliquis 2 5 daily and asking if she should restart ASA? Hematuria has resolved--pt will restart ASA as well

## 2021-05-20 NOTE — TELEPHONE ENCOUNTER
Odilon Said 1 hour ago (2:27 PM)        [address] [place] [address] from [facility] calling for normal saline solution flush 10 ml daily script to be sent to [facility] in [place]  She only uses [facility] for her flushes because [facility] does not carry  She is requesting it to be sent by tomorrow because of transportation

## 2021-05-21 ENCOUNTER — APPOINTMENT (OUTPATIENT)
Dept: LAB | Facility: CLINIC | Age: 76
End: 2021-05-21
Payer: COMMERCIAL

## 2021-05-21 ENCOUNTER — TELEPHONE (OUTPATIENT)
Dept: RADIOLOGY | Facility: HOSPITAL | Age: 76
End: 2021-05-21

## 2021-05-21 ENCOUNTER — TRANSCRIBE ORDERS (OUTPATIENT)
Dept: INTERVENTIONAL RADIOLOGY/VASCULAR | Facility: CLINIC | Age: 76
End: 2021-05-21

## 2021-05-21 ENCOUNTER — HOSPITAL ENCOUNTER (OUTPATIENT)
Dept: RADIOLOGY | Facility: HOSPITAL | Age: 76
Discharge: HOME | End: 2021-05-21
Attending: RADIOLOGY | Admitting: RADIOLOGY
Payer: COMMERCIAL

## 2021-05-21 ENCOUNTER — TELEPHONE (OUTPATIENT)
Dept: UROLOGY | Facility: AMBULATORY SURGERY CENTER | Age: 76
End: 2021-05-21

## 2021-05-21 DIAGNOSIS — I27.82 CHRONIC SADDLE PULMONARY EMBOLISM WITHOUT ACUTE COR PULMONALE (HCC): ICD-10-CM

## 2021-05-21 DIAGNOSIS — D45 POLYCYTHEMIA VERA (HCC): ICD-10-CM

## 2021-05-21 DIAGNOSIS — D63.1 ANEMIA DUE TO STAGE 3 CHRONIC KIDNEY DISEASE, UNSPECIFIED WHETHER STAGE 3A OR 3B CKD (HCC): ICD-10-CM

## 2021-05-21 DIAGNOSIS — N13.30 HYDRONEPHROSIS: ICD-10-CM

## 2021-05-21 DIAGNOSIS — N13.9 OBSTRUCTIVE UROPATHY: Primary | ICD-10-CM

## 2021-05-21 DIAGNOSIS — N18.5 ANEMIA IN STAGE 5 CHRONIC KIDNEY DISEASE, NOT ON CHRONIC DIALYSIS (HCC): ICD-10-CM

## 2021-05-21 DIAGNOSIS — N18.30 ANEMIA DUE TO STAGE 3 CHRONIC KIDNEY DISEASE, UNSPECIFIED WHETHER STAGE 3A OR 3B CKD (HCC): ICD-10-CM

## 2021-05-21 DIAGNOSIS — I26.92 CHRONIC SADDLE PULMONARY EMBOLISM WITHOUT ACUTE COR PULMONALE (HCC): ICD-10-CM

## 2021-05-21 DIAGNOSIS — D63.1 ANEMIA IN STAGE 5 CHRONIC KIDNEY DISEASE, NOT ON CHRONIC DIALYSIS (HCC): ICD-10-CM

## 2021-05-21 LAB
ANION GAP SERPL CALCULATED.3IONS-SCNC: 9 MMOL/L (ref 4–13)
BASOPHILS # BLD AUTO: 0.05 THOUSANDS/ΜL (ref 0–0.1)
BASOPHILS NFR BLD AUTO: 1 % (ref 0–1)
BUN SERPL-MCNC: 45 MG/DL (ref 5–25)
CALCIUM SERPL-MCNC: 8.5 MG/DL (ref 8.3–10.1)
CHLORIDE SERPL-SCNC: 109 MMOL/L (ref 100–108)
CO2 SERPL-SCNC: 22 MMOL/L (ref 21–32)
CREAT SERPL-MCNC: 3.4 MG/DL (ref 0.6–1.3)
EOSINOPHIL # BLD AUTO: 0.06 THOUSAND/ΜL (ref 0–0.61)
EOSINOPHIL NFR BLD AUTO: 1 % (ref 0–6)
ERYTHROCYTE [DISTWIDTH] IN BLOOD BY AUTOMATED COUNT: 14.7 % (ref 11.6–15.1)
FERRITIN SERPL-MCNC: 335 NG/ML (ref 8–388)
GFR SERPL CREATININE-BSD FRML MDRD: 13 ML/MIN/1.73SQ M
GLUCOSE P FAST SERPL-MCNC: 102 MG/DL (ref 65–99)
HCT VFR BLD AUTO: 28.3 % (ref 34.8–46.1)
HGB BLD-MCNC: 8.7 G/DL (ref 11.5–15.4)
IMM GRANULOCYTES # BLD AUTO: 0.08 THOUSAND/UL (ref 0–0.2)
IMM GRANULOCYTES NFR BLD AUTO: 2 % (ref 0–2)
IRON SATN MFR SERPL: 32 %
IRON SERPL-MCNC: 82 UG/DL (ref 50–170)
LYMPHOCYTES # BLD AUTO: 1.17 THOUSANDS/ΜL (ref 0.6–4.47)
LYMPHOCYTES NFR BLD AUTO: 21 % (ref 14–44)
MCH RBC QN AUTO: 29.2 PG (ref 26.8–34.3)
MCHC RBC AUTO-ENTMCNC: 30.7 G/DL (ref 31.4–37.4)
MCV RBC AUTO: 95 FL (ref 82–98)
MONOCYTES # BLD AUTO: 0.4 THOUSAND/ΜL (ref 0.17–1.22)
MONOCYTES NFR BLD AUTO: 7 % (ref 4–12)
NEUTROPHILS # BLD AUTO: 3.71 THOUSANDS/ΜL (ref 1.85–7.62)
NEUTS SEG NFR BLD AUTO: 68 % (ref 43–75)
NRBC BLD AUTO-RTO: 0 /100 WBCS
PLATELET # BLD AUTO: 426 THOUSANDS/UL (ref 149–390)
PMV BLD AUTO: 9.7 FL (ref 8.9–12.7)
POTASSIUM SERPL-SCNC: 4.2 MMOL/L (ref 3.5–5.3)
RBC # BLD AUTO: 2.98 MILLION/UL (ref 3.81–5.12)
SODIUM SERPL-SCNC: 140 MMOL/L (ref 136–145)
TIBC SERPL-MCNC: 255 UG/DL (ref 250–450)
WBC # BLD AUTO: 5.47 THOUSAND/UL (ref 4.31–10.16)

## 2021-05-21 PROCEDURE — 83540 ASSAY OF IRON: CPT

## 2021-05-21 PROCEDURE — C1769 GUIDE WIRE: HCPCS

## 2021-05-21 PROCEDURE — 85025 COMPLETE CBC W/AUTO DIFF WBC: CPT

## 2021-05-21 PROCEDURE — C2617 STENT, NON-COR, TEM W/O DEL: HCPCS

## 2021-05-21 PROCEDURE — 83550 IRON BINDING TEST: CPT

## 2021-05-21 PROCEDURE — 82728 ASSAY OF FERRITIN: CPT

## 2021-05-21 PROCEDURE — 50387 CHANGE NEPHROURETERAL CATH: CPT | Performed by: RADIOLOGY

## 2021-05-21 PROCEDURE — 80048 BASIC METABOLIC PNL TOTAL CA: CPT

## 2021-05-21 PROCEDURE — 36415 COLL VENOUS BLD VENIPUNCTURE: CPT

## 2021-05-21 PROCEDURE — 50688 CHANGE OF URETER TUBE/STENT: CPT

## 2021-05-21 RX ADMIN — IOHEXOL 25 ML: 350 INJECTION, SOLUTION INTRAVENOUS at 16:51

## 2021-05-21 NOTE — TELEPHONE ENCOUNTER
Patient had tube placement on 5/15/21 and now her left side bag is empty  There was very little output yesterday  She is concerned with her left kidney shutting down  She will call home care as well  She needs instruction

## 2021-05-21 NOTE — DISCHARGE INSTRUCTIONS
Nephrostomy Tube Care     WHAT YOU NEED TO KNOW:   A nephrostomy tube is a catheter (thin plastic tube) that is inserted through your skin and into your kidney  The nephrostomy tube drains urine from your kidney into a collecting bag outside your body  You may need a nephrostomy tube when something is blocking the normal flow of urine  A nephrostomy tube may be used for a short or a long period of time  The nephrostomy tube comes out of your back, so you will need someone to help care for your nephrostomy tube  DISCHARGE INSTRUCTIONS:      How to clean the skin around the nephrostomy tube and change the bandage:  Since the nephrostomy tube comes out of your back, you will not be able to care for it by yourself  Ask someone to follow the general directions below to check and care for your nephrostomy tube  Gather the items you will need  Disposable (single use) under-pad, and a clean washcloth  ¨ Plain soap, warm water, and new medical gloves  ¨ Sterile gauze bandages  ¨ Clear adhesive dressing or medical tape  ¨ Skin barrier  ¨ Protective skin film  ¨ Trash bag  · Remove the old bandage, and check the tube entry site  ¨ Have the patient lie on his side with the nephrostomy tube entry site facing up  Place the under-pad where it will catch drainage as you are working with the nephrostomy tube  ¨ Wash your hands with soap and water  Put on new medical gloves  ¨ Gently remove the old bandage, without pulling on the tube  Do this by holding the skin beside the tube with one hand  With the other hand, gently remove sticky tape and the skin barrier by pulling in the same direction as hair growth  Do not touch the side of the bandage that is placed over or around the tube  Throw the bandage and skin barrier away in a trash bag  ¨ Look for signs of infection, such as skin redness and swelling  Report any skin changes to healthcare providers  ¨ Clean the tube entry site      ¨ Hold the tube in place to keep it from being pulled out while you are cleaning around it  ¨ You will need to clean the area twice  For the first cleaning, wet a new gauze bandage with soap and water  Begin at the entry site of the tube  Wipe the skin in circles, moving away from the entry site  Remove blood and any other material with the gauze  Do this as often as needed  Use a new gauze bandage each time you clean the area, moving away from the entry site  ¨ For the second cleaning, wet a new gauze bandage with water  Begin at the entry site of the tube  Wipe the skin in circles, moving away from the entry site  Use a new gauze bandage each time you clean the area, moving away from the entry site  ¨ Gently pat the skin with a clean washcloth to dry it  · Apply the skin barrier and bandages  ¨ Roll up a bandage to make it thick, and place it under  the place where the tube enters the skin  Place it to support the tube, and stop it from kinking or bending  Tape the bandage in place, and apply more bandages if directed by a healthcare provider  ¨ Bring the tubing forward to the front and tape it to the skin  Do not stretch the tube tight, because this may pull the nephrostomy tube out  How often to change the bandage  Change the bandage around the tube, every other day  If your bandages  get dirty or wet, change them right away, and as often as needed  If your nephrostomy tube is to be used for a long period of time, the tube needs to be changed every 2 to 3 months  Healthcare providers will tell you when you need to make an appointment to have your tube changed  How to care for the urine drainage bag:   · Ask if you need to measure and write down how much urine is in the bag before you empty it  Drain urine out of the drainage bag when it is ½ to ? full  Open the spout at the bottom of the bag to empty the urine into the toilet  · You may need to detach the drainage bag from the nephrostomy tube to change it    If so, attach a new drainage bag tightly to the nephrostomy tube  ·   How to prevent problems with your nephrostomy tube:   · Change bandages, directed  This helps to prevent infection  Throw away or clean your drainage bag as directed by your healthcare provider  · Wipe the connecting ends of the drainage bag with alcohol before you reconnect the bag to the tube  This helps prevent infection  Keep the tube taped to your skin and connected to a drainage bag placed below the level of your kidneys  This helps prevent urine from backing up into your kidneys  You may wear a small drainage bag strapped to your leg to let you move around more easily  · Check the catheter to be sure it is in place after you change your clothes or do other activities  Do not wear tight clothing over the tube  Place the tubing over your thigh rather than under it when you are sitting down  Be sure that nothing is pulling on the nephrostomy tube when you move around  · Change positions if you see little or no urine in your drainage bag  Check to see if the urine tube is twisted or bent  Be sure that you are not sitting or lying on the tube  If there are no kinks and there is little or no urine in the drainage bag, tell your healthcare provider  · Flush out the tube as directed  Some tubes get flushed one time a day with 10 mls of NSS You will be given a prescription for the flushes  To flush the nephrostomy tube, clean both connections with alcohol swap  Twist off the drainage bag tube and twist the saline syringe into the nephrostomy tube and flush briskly  Remove the syringe and twist the drainage bag tube back into the nephrostomy tube  · Keep the site covered while you shower  Tape a piece of clear adhesive plastic over the dressing to keep it dry while you shower  Do not take tub baths      Contact Interventional Radiology at [phone] [employer] PATIENTS: Contact Interventional Radiology at [phone]) Gayla Sears PATIENTS: Contact Interventional Radiology at [phone]) if:  · The skin around the nephrostomy tube is red, swollen, itches, or has a rash  · You have a fever greater than 101 or chills  · You have lower back or hip pain  · There are changes in how your urine looks or smells  · You have little or no urine draining from the nephrostomy tube  · You have nausea and are vomiting  · The black amado on your tube has moved, or the tube is longer than when it was put in    · You have questions or concerns about your condition or care  · The nephrostomy tube comes out completely  · There is blood, pus, or a bad smell coming from the place where the tube enters your skin  · Urine is leaking around the tube  The following pharmacies carry the flush syringes  [facility]                     [facility]       [address]  Phone [phone]            Phone [phone]  [address] [facility]                      [address]                                 [phone]  Phone [phone]            Phone [phone]    [facility]                                                                         [facility] [phone]  [address]   Phone [phone]

## 2021-05-21 NOTE — SEDATION DOCUMENTATION
Left PCNU exchanged from 8 5F x 22cm to 8 5F x 28cm by Dr Curly Burris  Patient tolerated well, dressing clean dry and intact  AVS printed, education provided  Pt escorted to her car via wheelchair

## 2021-05-21 NOTE — TELEPHONE ENCOUNTER
LMOM, as per communication consent, recommending Patient go to ER for possible neph tube flush and/or further testing  Encouraged to call office with questions or concerns in the interim and office number given

## 2021-05-24 ENCOUNTER — HOSPITAL ENCOUNTER (OUTPATIENT)
Dept: INFUSION CENTER | Facility: HOSPITAL | Age: 76
Discharge: HOME | End: 2021-05-24
Attending: INTERNAL MEDICINE
Payer: COMMERCIAL

## 2021-05-24 ENCOUNTER — PREP FOR PROCEDURE (OUTPATIENT)
Dept: INTERVENTIONAL RADIOLOGY/VASCULAR | Facility: CLINIC | Age: 76
End: 2021-05-24

## 2021-05-24 VITALS — SYSTOLIC BLOOD PRESSURE: 138 MMHG | DIASTOLIC BLOOD PRESSURE: 82 MMHG | TEMPERATURE: 97.7 F

## 2021-05-24 DIAGNOSIS — N13.9 OBSTRUCTIVE UROPATHY: Primary | ICD-10-CM

## 2021-05-24 DIAGNOSIS — N18.5 ANEMIA IN STAGE 5 CHRONIC KIDNEY DISEASE, NOT ON CHRONIC DIALYSIS (HCC): Primary | ICD-10-CM

## 2021-05-24 DIAGNOSIS — D63.1 ANEMIA IN STAGE 5 CHRONIC KIDNEY DISEASE, NOT ON CHRONIC DIALYSIS (HCC): Primary | ICD-10-CM

## 2021-05-24 PROCEDURE — 96372 THER/PROPH/DIAG INJ SC/IM: CPT

## 2021-05-24 RX ORDER — SODIUM CHLORIDE 9 MG/ML
75 INJECTION, SOLUTION INTRAVENOUS CONTINUOUS
Status: CANCELLED | OUTPATIENT
Start: 2021-05-24

## 2021-05-24 RX ORDER — LEVOFLOXACIN 5 MG/ML
500 INJECTION, SOLUTION INTRAVENOUS ONCE
Status: CANCELLED | OUTPATIENT
Start: 2021-05-24 | End: 2021-05-24

## 2021-05-24 RX ADMIN — DARBEPOETIN ALFA 100 MCG: 100 INJECTION, SOLUTION INTRAVENOUS; SUBCUTANEOUS at 10:25

## 2021-05-24 NOTE — PLAN OF CARE
Problem: Potential for Falls  Goal: Patient will remain free of falls  Description: INTERVENTIONS:  - Assess patient frequently for physical needs  -  Identify cognitive and physical deficits and behaviors that affect risk of falls    -  [place] fall precautions as indicated by assessment   - Educate patient/family on patient safety including physical limitations  - Instruct patient to call for assistance with activity based on assessment  - Modify environment to reduce risk of injury  - Consider OT/PT consult to assist with strengthening/mobility  Outcome: Progressing

## 2021-05-26 NOTE — ED ATTENDING ATTESTATION
5/17/2021  IArchana DO, saw and evaluated the patient  I have discussed the patient with the resident/non-physician practitioner and agree with the resident's/non-physician practitioner's findings, Plan of Care, and MDM as documented in the resident's/non-physician practitioner's note, except where noted  All available labs and Radiology studies were reviewed  I was present for key portions of any procedure(s) performed by the resident/non-physician practitioner and I was immediately available to provide assistance  At this point I agree with the current assessment done in the Emergency Department  I have conducted an independent evaluation of this patient a history and physical is as follows:    62-year-old female presents for hematuria  Recent placement of nephrostomy tube history of ileal conduit on Eliquis    Plan is to check labs for anemia, discussed with urology for disposition if labs normal    ED Course         Critical Care Time  Procedures

## 2021-06-01 NOTE — TELEPHONE ENCOUNTER
Patient is calling to say she had IR procedure scheduled for 6/9/21 and stated she does not want to wait that long  She stated she has to speak to clinical regarding what's going to be done  She said she had them try this before and it did not work

## 2021-06-01 NOTE — TELEPHONE ENCOUNTER
Called Subha Nelly back and she had questions about her IR tube exchange  Explained I couldn't answer her questions but I gave her IR's phone number [phone]    I also asked if her tubes were draining and she said they were

## 2021-06-04 ENCOUNTER — TELEPHONE (OUTPATIENT)
Dept: RADIOLOGY | Facility: HOSPITAL | Age: 76
End: 2021-06-04

## 2021-06-04 NOTE — PRE-PROCEDURE INSTRUCTIONS
Phone Consult completed:Pre procedure instructions for conversion of PCNU to retrograde reviewed with verbal understanding  Allergies,meds,NPO, and ride  Approximate arrival time given,SDS phone call evening before procedure  Patient will bring extra appliance bag with her  COVID screening completed no vaccine

## 2021-06-08 ENCOUNTER — TELEPHONE (OUTPATIENT)
Dept: SURGERY | Facility: HOSPITAL | Age: 76
End: 2021-06-08

## 2021-06-09 ENCOUNTER — HOSPITAL ENCOUNTER (OUTPATIENT)
Dept: RADIOLOGY | Facility: HOSPITAL | Age: 76
Discharge: HOME | End: 2021-06-09
Attending: RADIOLOGY | Admitting: RADIOLOGY
Payer: COMMERCIAL

## 2021-06-09 VITALS
WEIGHT: 195 LBS | OXYGEN SATURATION: 97 % | RESPIRATION RATE: 16 BRPM | TEMPERATURE: 97.7 F | BODY MASS INDEX: 38.28 KG/M2 | HEART RATE: 63 BPM | HEIGHT: 60 IN | DIASTOLIC BLOOD PRESSURE: 74 MMHG | SYSTOLIC BLOOD PRESSURE: 145 MMHG

## 2021-06-09 DIAGNOSIS — N13.9 OBSTRUCTIVE UROPATHY: Primary | ICD-10-CM

## 2021-06-09 PROCEDURE — 50434 CONVERT NEPHROSTOMY CATHETER: CPT

## 2021-06-09 PROCEDURE — C1894 INTRO/SHEATH, NON-LASER: HCPCS

## 2021-06-09 PROCEDURE — 50688 CHANGE OF URETER TUBE/STENT: CPT | Performed by: RADIOLOGY

## 2021-06-09 PROCEDURE — 99153 MOD SED SAME PHYS/QHP EA: CPT

## 2021-06-09 PROCEDURE — 99152 MOD SED SAME PHYS/QHP 5/>YRS: CPT | Performed by: RADIOLOGY

## 2021-06-09 PROCEDURE — C1769 GUIDE WIRE: HCPCS

## 2021-06-09 PROCEDURE — 99152 MOD SED SAME PHYS/QHP 5/>YRS: CPT

## 2021-06-09 PROCEDURE — 75984 XRAY CONTROL CATHETER CHANGE: CPT | Performed by: RADIOLOGY

## 2021-06-09 PROCEDURE — C1751 CATH, INF, PER/CENT/MIDLINE: HCPCS

## 2021-06-09 PROCEDURE — C1729 CATH, DRAINAGE: HCPCS

## 2021-06-09 RX ORDER — FENTANYL CITRATE 50 UG/ML
INJECTION, SOLUTION INTRAMUSCULAR; INTRAVENOUS CODE/TRAUMA/SEDATION MEDICATION
Status: COMPLETED | OUTPATIENT
Start: 2021-06-09 | End: 2021-06-09

## 2021-06-09 RX ORDER — SODIUM CHLORIDE 9 MG/ML
75 INJECTION, SOLUTION INTRAVENOUS CONTINUOUS
Status: DISCONTINUED | OUTPATIENT
Start: 2021-06-09 | End: 2021-06-09 | Stop reason: HOSPADM

## 2021-06-09 RX ORDER — CEFAZOLIN SODIUM 1 G/3ML
INJECTION, POWDER, FOR SOLUTION INTRAMUSCULAR; INTRAVENOUS CODE/TRAUMA/SEDATION MEDICATION
Status: COMPLETED | OUTPATIENT
Start: 2021-06-09 | End: 2021-06-09

## 2021-06-09 RX ORDER — SODIUM CHLORIDE 9 MG/ML
10 INJECTION INTRAVENOUS DAILY
Qty: 300 ML | Refills: 6 | Status: SHIPPED | OUTPATIENT
Start: 2021-06-09 | End: 2021-08-03 | Stop reason: HOSPADM

## 2021-06-09 RX ORDER — LEVOFLOXACIN 5 MG/ML
500 INJECTION, SOLUTION INTRAVENOUS ONCE
Status: DISCONTINUED | OUTPATIENT
Start: 2021-06-09 | End: 2021-06-09

## 2021-06-09 RX ORDER — MIDAZOLAM HYDROCHLORIDE 2 MG/2ML
INJECTION, SOLUTION INTRAMUSCULAR; INTRAVENOUS CODE/TRAUMA/SEDATION MEDICATION
Status: COMPLETED | OUTPATIENT
Start: 2021-06-09 | End: 2021-06-09

## 2021-06-09 RX ADMIN — FENTANYL CITRATE 50 MCG: 50 INJECTION INTRAMUSCULAR; INTRAVENOUS at 14:33

## 2021-06-09 RX ADMIN — MIDAZOLAM 1 MG: 1 INJECTION INTRAMUSCULAR; INTRAVENOUS at 13:52

## 2021-06-09 RX ADMIN — FENTANYL CITRATE 50 MCG: 50 INJECTION INTRAMUSCULAR; INTRAVENOUS at 13:52

## 2021-06-09 RX ADMIN — SODIUM CHLORIDE 75 ML/HR: 0.9 INJECTION, SOLUTION INTRAVENOUS at 13:45

## 2021-06-09 RX ADMIN — MIDAZOLAM 1 MG: 1 INJECTION INTRAMUSCULAR; INTRAVENOUS at 14:33

## 2021-06-09 RX ADMIN — IOHEXOL 55 ML: 350 INJECTION, SOLUTION INTRAVENOUS at 15:04

## 2021-06-09 RX ADMIN — CEFAZOLIN 1000 MG: 1 INJECTION, POWDER, FOR SOLUTION INTRAMUSCULAR; INTRAVENOUS at 14:53

## 2021-06-09 NOTE — DISCHARGE INSTRUCTIONS
Nephrostomy Tube Care     WHAT YOU NEED TO KNOW:   A nephrostomy tube is a catheter (thin plastic tube) that is inserted through your skin and into your kidney  The nephrostomy tube drains urine from your kidney into a collecting bag outside your body  You may need a nephrostomy tube when something is blocking the normal flow of urine  A nephrostomy tube may be used for a short or a long period of time  The nephrostomy tube comes out of your back, so you will need someone to help care for your nephrostomy tube  DISCHARGE INSTRUCTIONS:      How to clean the skin around the nephrostomy tube and change the bandage:  Since the nephrostomy tube comes out of your back, you will not be able to care for it by yourself  Ask someone to follow the general directions below to check and care for your nephrostomy tube  Gather the items you will need  Disposable (single use) under-pad, and a clean washcloth  ¨ Plain soap, warm water, and new medical gloves  ¨ Sterile gauze bandages  ¨ Clear adhesive dressing or medical tape  ¨ Skin barrier  ¨ Protective skin film  ¨ Trash bag  · Remove the old bandage, and check the tube entry site  ¨ Have the patient lie on his side with the nephrostomy tube entry site facing up  Place the under-pad where it will catch drainage as you are working with the nephrostomy tube  ¨ Wash your hands with soap and water  Put on new medical gloves  ¨ Gently remove the old bandage, without pulling on the tube  Do this by holding the skin beside the tube with one hand  With the other hand, gently remove sticky tape and the skin barrier by pulling in the same direction as hair growth  Do not touch the side of the bandage that is placed over or around the tube  Throw the bandage and skin barrier away in a trash bag  ¨ Look for signs of infection, such as skin redness and swelling  Report any skin changes to healthcare providers  ¨ Clean the tube entry site      ¨ Hold the tube in place to keep it from being pulled out while you are cleaning around it  ¨ You will need to clean the area twice  For the first cleaning, wet a new gauze bandage with soap and water  Begin at the entry site of the tube  Wipe the skin in circles, moving away from the entry site  Remove blood and any other material with the gauze  Do this as often as needed  Use a new gauze bandage each time you clean the area, moving away from the entry site  ¨ For the second cleaning, wet a new gauze bandage with water  Begin at the entry site of the tube  Wipe the skin in circles, moving away from the entry site  Use a new gauze bandage each time you clean the area, moving away from the entry site  ¨ Gently pat the skin with a clean washcloth to dry it  · Apply the skin barrier and bandages  ¨ Roll up a bandage to make it thick, and place it under  the place where the tube enters the skin  Place it to support the tube, and stop it from kinking or bending  Tape the bandage in place, and apply more bandages if directed by a healthcare provider  ¨ Bring the tubing forward to the front and tape it to the skin  Do not stretch the tube tight, because this may pull the nephrostomy tube out  How often to change the bandage  Change the bandage around the tube, every other day  If your bandages  get dirty or wet, change them right away, and as often as needed  If your nephrostomy tube is to be used for a long period of time, the tube needs to be changed every 2 to 3 months  Healthcare providers will tell you when you need to make an appointment to have your tube changed  How to care for the urine drainage bag:   · Ask if you need to measure and write down how much urine is in the bag before you empty it  Drain urine out of the drainage bag when it is ½ to ? full  Open the spout at the bottom of the bag to empty the urine into the toilet  · You may need to detach the drainage bag from the nephrostomy tube to change it    If so, attach a new drainage bag tightly to the nephrostomy tube  ·   How to prevent problems with your nephrostomy tube:   · Change bandages, directed  This helps to prevent infection  Throw away or clean your drainage bag as directed by your healthcare provider  · Wipe the connecting ends of the drainage bag with alcohol before you reconnect the bag to the tube  This helps prevent infection  Keep the tube taped to your skin and connected to a drainage bag placed below the level of your kidneys  This helps prevent urine from backing up into your kidneys  You may wear a small drainage bag strapped to your leg to let you move around more easily  · Check the catheter to be sure it is in place after you change your clothes or do other activities  Do not wear tight clothing over the tube  Place the tubing over your thigh rather than under it when you are sitting down  Be sure that nothing is pulling on the nephrostomy tube when you move around  · Change positions if you see little or no urine in your drainage bag  Check to see if the urine tube is twisted or bent  Be sure that you are not sitting or lying on the tube  If there are no kinks and there is little or no urine in the drainage bag, tell your healthcare provider  · Flush out the tube as directed  Some tubes get flushed one time a day with 10 mls of NSS You will be given a prescription for the flushes  To flush the nephrostomy tube, clean both connections with alcohol swap  Twist off the drainage bag tube and twist the saline syringe into the nephrostomy tube and flush briskly  Remove the syringe and twist the drainage bag tube back into the nephrostomy tube  · Keep the site covered while you shower  Tape a piece of clear adhesive plastic over the dressing to keep it dry while you shower  Do not take tub baths      Contact Interventional Radiology at [phone] [facility] PATIENTS: Contact Interventional Radiology at [phone]) [facility] PATIENTS: Contact Interventional Radiology at [phone]) if:  · The skin around the nephrostomy tube is red, swollen, itches, or has a rash  · You have a fever greater than 101 or chills  · You have lower back or hip pain  · There are changes in how your urine looks or smells  · You have little or no urine draining from the nephrostomy tube  · You have nausea and are vomiting  · The black amado on your tube has moved, or the tube is longer than when it was put in    · You have questions or concerns about your condition or care  · The nephrostomy tube comes out completely  · There is blood, pus, or a bad smell coming from the place where the tube enters your skin  · Urine is leaking around the tube  The following pharmacies carry the flush syringes  [facility]       [address] [facility]                    [id number] [place] [address]  Phone [phone]            Phone [phone]  [address] & [facility]                      [address]                                 [phone]  Phone [phone]            Phone [phone]    [facility]                                                                         [facility] [phone]  [address]  Procedural Sedation   WHAT YOU NEED TO KNOW:   Procedural sedation is medicine used during procedures to help you feel relaxed and calm  You will remember little to none of the procedure  After sedation you may feel tired, weak, or unsteady on your feet   You may also have trouble concentrating or short-term memory loss  These symptoms should go away in 24 hours or less  DISCHARGE INSTRUCTIONS:     Call 911 or have someone else call for any of the following:   · You have sudden trouble breathing      · You cannot be woken  Contact Interventional Radiology at [phone]   [facility] PATIENTS: Contact Interventional Radiology at [phone]) [facility] PATIENTS: Contact Interventional Radiology at [phone]) if any of the following occur:     · You have a severe headache or dizziness      · Your heart is beating faster than usual     · You have a fever or chills      · Your skin is itchy, swollen, or you have a rash      · You have nausea or are vomiting for more than 8 hours after the procedure       · You have questions or concerns about your condition or care  Self-care:   · Have someone stay with you for 24 hours  This person can drive you to errands and help you do things around the house  This person can also watch for problems       · Rest and do quiet activities for 24 hours  Do not exercise, ride a bike, or play sports  Stand up slowly to prevent dizziness and falls  Take short walks around the house with another person  Slowly return to your usual activities the next day       · Do not drive or use dangerous machines or tools for 24 hours  You may injure yourself or others  Examples include a lawnmower, saw, or drill  Do not return to work for 24 hours if you use dangerous machines or tools for work       · Do not make important decisions for 24 hours  For example, do not sign important papers or invest money       · Drink liquids as directed  Liquids help flush the sedation medicine out of your body  Ask how much liquid to drink each day and which liquids are best for you       · Eat small, frequent meals to prevent nausea and vomiting  Start with clear liquids such as juice or broth   If you do not vomit after clear liquids, you can eat your usual foods       · Do not drink alcohol or take medicines that make you drowsy  This includes medicines that help you sleep and anxiety medicines  Ask your healthcare provider if it is safe for you to take pain medicine  Follow up with your healthcare provider as directed: Write down your questions so you remember to ask them during your visits       Danny Sheikh   Phone [phone]

## 2021-06-09 NOTE — SEDATION DOCUMENTATION
Left PCNU converted to Retrograde 10 fr x 40 cm PCNU to left Kidney with Safety 8fr PCN to Left Kidney  Plan is to return if about 5 days to remove Left PCN if Retrograde PCNU working properly

## 2021-06-09 NOTE — BRIEF OP NOTE (RAD/CATH)
IR NEPHROSTOMY/PCNU CONVERSION TO RETROGRADE/ILEAL CONDUIT NEPHROSTOMY  Procedure Note    PATIENT NAME: Carole Callahan  : 1945  MRN: [id number]     Pre-op Diagnosis:   1  Obstructive uropathy      Post-op Diagnosis:   1  Obstructive uropathy        Surgeon:   Tip Leon DO  Assistants:     No qualified resident was available      Estimated Blood Loss: None  Findings:   · 8F safety PCN capped  · Retrograde 10F x 40 cm Resolve APD with pigtail in L renal pelvis    Specimens: none    Complications:  none    Anesthesia: conscious sedation and local    Tip Leon DO     Date: 2021  Time: 2:48 PM

## 2021-06-09 NOTE — INTERVAL H&P NOTE
H&P reviewed  After examining the patient, I find no changed to the H&P since it had been written  BP (!) 192/82 (BP Location: Left arm)   Pulse 66   Temp 98 2 °F (36 8 °C) (Oral)   Resp 16   Ht 5' (1 524 m)   Wt 88 5 kg (195 lb)   SpO2 96%   BMI 38 08 kg/m²     Patient re-evaluated   Accept as history and physical     Telma Mao, DO/June 9, 2021/1:15 PM

## 2021-06-11 ENCOUNTER — OFFICE VISIT (OUTPATIENT)
Dept: VASCULAR SURGERY | Facility: CLINIC | Age: 76
End: 2021-06-11
Payer: COMMERCIAL

## 2021-06-11 VITALS
SYSTOLIC BLOOD PRESSURE: 142 MMHG | WEIGHT: 193 LBS | HEIGHT: 60 IN | RESPIRATION RATE: 16 BRPM | BODY MASS INDEX: 37.89 KG/M2 | DIASTOLIC BLOOD PRESSURE: 88 MMHG | HEART RATE: 75 BPM

## 2021-06-11 DIAGNOSIS — K56.609 SMALL BOWEL OBSTRUCTION (HCC): ICD-10-CM

## 2021-06-11 DIAGNOSIS — N18.5 STAGE 5 CHRONIC KIDNEY DISEASE NOT ON CHRONIC DIALYSIS (HCC): ICD-10-CM

## 2021-06-11 DIAGNOSIS — I27.82 CHRONIC SADDLE PULMONARY EMBOLISM WITHOUT ACUTE COR PULMONALE (HCC): ICD-10-CM

## 2021-06-11 DIAGNOSIS — I82.B21: Primary | ICD-10-CM

## 2021-06-11 DIAGNOSIS — N13.9 OBSTRUCTIVE UROPATHY: ICD-10-CM

## 2021-06-11 DIAGNOSIS — I26.92 CHRONIC SADDLE PULMONARY EMBOLISM WITHOUT ACUTE COR PULMONALE (HCC): ICD-10-CM

## 2021-06-11 PROCEDURE — 99215 OFFICE O/P EST HI 40 MIN: CPT | Performed by: SURGERY

## 2021-06-11 NOTE — PROGRESS NOTES
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)  Stage 5 chronic kidney disease not on chronic dialysis ([facility] [address] [address] )  -reviewed vein mapping which shows chronic SVT in the B cephalic veins just above the AC fossa; L basilic is inadequate size; R basilic appears adequate size in the upper arm; brachial bifur at the [facility] fossa; no evidence of R central occlusion  -s/p creation of right brachiobasilic fistula 6/4/43  -excellent thrill/bruit; incision healing well; does have significant RUE edema  -s/p fistulogram w/ R ax/subclavian PTA to 10mm ([place] 2/10/21)  -significant improvement in edema  -needs DU to assess for maturity, unclear why this wasn't done  -f/u after DU complete to review, discuss symptoms, and plan for second stage if ready  -cards clearance from first [facility] ( [facility]): stress with small reversible defect; plan for medical management; placed at "acceptable risk"    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)  -chronic anticoagulation with Eliquis    Small bowel obstruction ([facility] [address] )  -recent admission for this; managed conservatively; still has ostomy  -per gen surg    Obstructive uropathy  -perc neph tubes w/ recent exchanges    Medications  [place] ASA/statin and Eliquis    Subjective:      Patient ID: Toni Amen is a 77 y o  female  Patient is here for a 3 month follow up  Pt is s/p Rt arm AVF creation on 1/5/21  Pt denies arm pain  Pt does have some swelling and bruising  Pt is not on HD  There is a thrill and bruit  Pt is on Eliquis, ASA 81 mg, and Atorvastatin  HPI:    Patient presents for followup after RUE fistula creation  Referred by Dr Evelyn Gautam  R-handed  CKD, not yet on HD  Had bladder surgery originally for urinary incontinance '16  Had incarcerated parastomal hernia s/p ex-lap, reduction and parastomal hernia repair with Phasix mesh  Has had prolonged recovery from this  Is living at home now, doing home PT  Walking with cane/walker  Has a grown autistic son in his 45s who she cares for  Since her last visit, she had a SBO and this was treated medically  She also had nephrostomy tubes and needed tube exchanges  Has hx of 2 lines to the R neck, one in July '20 and one in Aug '20  Denies known hx of central stenosis although 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  She doesn't know any thing about this  She is s/p R brachiobasilic fistula  She underwent fistulogram with treatment of central stenosis in Feb   Her swelling is improved but not completely resolved  Last week, she bumped her arm on something in her bathroom and opened the skin and has bruising  Denies any hand symptoms, denies numbness/tingling/weakness  Denies CP or cardiac history  Not established with cards  Complains of SOB with activity  The following portions of the patient's history were reviewed and updated as appropriate: allergies, current medications, past family history, past medical history, past social history, past surgical history and problem list     Review of Systems   Constitutional: Negative  Negative for chills and fever  HENT: Negative  Eyes: Negative  Respiratory: Negative  Negative for shortness of breath  Cardiovascular: Negative  Negative for chest pain  Gastrointestinal: Negative  Endocrine: Negative  Genitourinary: Negative  Musculoskeletal: Negative  Arm swelling   Skin: Positive for wound (R arm)  Allergic/Immunologic: Negative  Neurological: Negative  Hematological: Bruises/bleeds easily  Psychiatric/Behavioral: Negative            Objective:      /88 (BP Location: Left arm, Patient Position: Sitting)   Pulse 75   Resp 16   Ht 5' (1 524 m)   Wt 87 5 kg (193 lb)   BMI 37 69 kg/m²          Physical Exam  Vitals signs and nursing note reviewed  Constitutional:       Appearance: She is well-developed  HENT:      Head: Normocephalic and atraumatic  Eyes:      Conjunctiva/sclera: Conjunctivae normal    Neck:      Musculoskeletal: Normal range of motion and neck supple  Cardiovascular:      Rate and Rhythm: Normal rate and regular rhythm  Pulses:           Radial pulses are 2+ on the right side and 2+ on the left side  Dorsalis pedis pulses are 2+ on the right side and 2+ on the left side  Posterior tibial pulses are 0 on the right side and 0 on the left side  Heart sounds: Normal heart sounds  No murmur  Comments: No carotid bruits B    R B/B fistula w/ excellent thrill and bruit  Pulmonary:      Effort: Pulmonary effort is normal  No respiratory distress  Breath sounds: Normal breath sounds  No wheezing or rales  Abdominal:      General: There is no distension  Palpations: Abdomen is soft  Tenderness: There is no abdominal tenderness  There is no rebound  Musculoskeletal: Normal range of motion  Right lower le+ Edema present  Left lower le+ Edema present  Skin:     General: Skin is warm and dry  Comments: R anticub incision well healed  Mild edema of the R hand, forearm, upper arm with some resolving ecchymosis on the lateral upper arm; there is a 2cm shallow scabbed wound near the elbow, distant from the fistula   Neurological:      Mental Status: She is alert and oriented to person, place, and time  Psychiatric:         Behavior: Behavior normal            I have reviewed and made appropriate changes to the review of systems input by the medical assistant      Vitals:    21 1059   BP: 142/88   BP Location: Left arm   Patient Position: Sitting   Pulse: 75   Resp: 16   Weight: 87 5 kg (193 lb)   Height: 5' (1 524 m)       Patient Active Problem List   Diagnosis    Chronic saddle pulmonary embolism (HCC)    Bladder mass    Small bowel obstruction (HCC)    Obstructive uropathy    Secondary hyperparathyroidism of renal origin ([facility] [address] )    Hypertension    Polycythemia vera ([facility] )    Intraventricular hemorrhage (HCC)    Anemia in CKD (chronic kidney disease)    Mass of urethra    Stage 5 chronic kidney disease not on chronic dialysis ([facility] [facility] )    Thoracic compression fracture (HCC)    Benign neoplasm of supratentorial region of brain ([facility] )    Meningioma ([facility] )    Inverted T wave    Polycythemia    History of Clostridioides difficile colitis    History of shingles    Depression    Adult BMI 39 0-39 9 kg/sq m    Chronic thrombosis of subclavian vein (HCC)    Hyperlipemia    Gross hematuria    Hydronephrosis of left kidney    Anemia    Constipation    Obstructive left pyelonephritis    Right upper quadrant cyst    Obesity, morbid ([facility] [address] )       Past Surgical History:   Procedure Laterality Date    ABDOMINAL ADHESION SURGERY N/A 8/9/2020    Procedure: LYSIS ADHESIONS;  Surgeon: Charo Vargas DO;  [facility];  Service: General    BLADDER SUSPENSION      BOTOX INJECTION N/A 7/27/2016    Procedure: BOTOX INJECTION ;  Surgeon: Marta Gracia MD;  [facility];  Service:     CHOLECYSTECTOMY N/A     COLONOSCOPY      CYSTECTOMY, RADICAL WITH ILEOCONDUIT N/A 10/4/2016    Procedure: SUPRATRIGONAL CYSTECTOMY WITH ILEAL CONDUIT ;  Surgeon: Marta Gracia MD;  [facility];  Service:    Celester Dago W/ RETROGRADES Bilateral 7/27/2016    Procedure: CYSTOSCOPY; RETROGRADE PYELOGRAM ;  Surgeon: Marta Gracia MD;  [facility];  Service:     HERNIA REPAIR      IR AV FISTULAGRAM/GRAFTOGRAM  2/10/2021    IR NEPHROSTOMY TO NEPHROURETERAL STENT  5/15/2021    IR NEPHROSTOMY TO NEPHROURETERAL STENT  6/9/2021    IR NEPHROSTOMY TUBE CHECK/CHANGE/REPOSITION/REINSERTION/UPSIZE  5/14/2021    IR NEPHROSTOMY TUBE CHECK/CHANGE/REPOSITION/REINSERTION/UPSIZE  5/21/2021    IR NEPHROSTOMY TUBE PLACEMENT  5/10/2021    IR NON-TUNNELED CENTRAL LINE PLACEMENT  7/17/2020    IR NON-TUNNELED CENTRAL LINE PLACEMENT  8/14/2020    LAPAROTOMY N/A 8/9/2020    Procedure: LAPAROTOMY EXPLORATORY, PARASTOMAL HERNIA REPAIR WITH MESH;  Surgeon: Kaitlynn Bustillo DO;  [facility];  Service: General    NC ANASTOMOSIS,AV,ANY SITE Right 1/5/2021    Procedure: Creation of right brachiobasilic fistula; Surgeon: Nereyda Chairez MD;  [facility];  Service: Vascular    NC COLONOSCOPY FLX DX W/COLLJ SPEC WHEN PFRMD N/A 8/31/2016    Procedure: COLONOSCOPY;  Surgeon: Christen Gracia MD;  [facility]; Service: Gastroenterology    NC CYSTOSCOPY,INSERT URETERAL STENT Bilateral 7/27/2016    Procedure: STENT INSERTION; EXCISION OF MESH ;  Surgeon: Kristin Macedo MD;  [facility];  Service: Urology    TONSILLECTOMY      TUBAL LIGATION      WISDOM TOOTH EXTRACTION         Family History   Problem Relation Age of Onset    Cancer Mother         small cell cancer     Heart disease Father     COPD Father     Heart disease Brother     Nephrolithiasis Brother        Social History     Socioeconomic History    Marital status:       Spouse name: Not on file    Number of children: Not on file    Years of education: Not on file    Highest education level: Not on file   Occupational History    Not on file   Social Needs    Financial resource strain: Not on file    Food insecurity     Worry: Not on file     Inability: Not on file    Transportation needs     Medical: Not on file     Non-medical: Not on file   Tobacco Use    Smoking status: Never Smoker    Smokeless tobacco: Never Used    Tobacco comment: n/a   Substance and Sexual Activity    Alcohol use: Not Currently     Frequency: Never     Binge frequency: Never     Comment: n/s  Drug use: Not Currently     Comment: n/a    Sexual activity: Not Currently     Partners: Male   Lifestyle    Physical activity     Days per week: Not on file     Minutes per session: Not on file    Stress: Not on file   Relationships    Social connections     Talks on phone: Not on file     Gets together: Not on file     Attends Mandaeism service: Not on file     Active member of club or organization: Not on file     Attends meetings of clubs or organizations: Not on file     Relationship status: Not on file    Intimate partner violence     Fear of current or ex partner: Not on file     Emotionally abused: Not on file     Physically abused: Not on file     Forced sexual activity: Not on file   Other Topics Concern    Not on file   Social History Narrative    Not on file       Allergies   Allergen Reactions    Chlorhexidine Rash     petichi like rash when using chlorhexidine swabs prior to IV  Current Outpatient Medications:     acetaminophen (TYLENOL) 325 mg tablet, 650 mg every 6 hours as needed for mild pain or headache, Disp: 30 tablet, Rfl: 0    aspirin (ECOTRIN LOW STRENGTH) 81 mg EC tablet, Take 1 tablet (81 mg total) by mouth daily, Disp: 90 tablet, Rfl: 4    atorvastatin (LIPITOR) 40 mg tablet, Take 1 tablet (40 mg total) by mouth daily (Patient taking differently: Take 40 mg by mouth daily at bedtime ), Disp: 90 tablet, Rfl: 6    calcitriol (ROCALTROL) 0 25 mcg capsule, TAKE 1 CAPSULE BY MOUTH EVERY OTHER DAY, Disp: 15 capsule, Rfl: 5    Cholecalciferol (VITAMIN D3) 1000 UNITS CAPS, Take 1,000 unit marking on U-100 syringe by mouth daily  , Disp: , Rfl:     citalopram (CeleXA) 20 mg tablet, Take 20 mg by mouth daily , Disp: , Rfl:     docusate sodium (COLACE) 100 mg capsule, Take 1 capsule (100 mg total) by mouth 2 (two) times a day, Disp: 10 capsule, Rfl: 0    Eliquis 2 5 MG, TAKE 1 TABLET BY MOUTH TWICE A DAY, Disp: 60 tablet, Rfl: 5    iron polysaccharides (FERREX) 150 mg capsule, Take 1 capsule (150 mg total) by mouth daily, Disp: 30 capsule, Rfl: 0    levothyroxine 75 mcg tablet, Take 75 mcg by mouth daily, Disp: , Rfl: 3    loperamide (IMODIUM) 2 mg capsule, Take 1 capsule (2 mg total) by mouth 4 (four) times a day as needed for diarrhea, Disp: 12 capsule, Rfl: 0    melatonin 3 mg, Take 1 tablet (3 mg total) by mouth daily at bedtime, Disp: 30 tablet, Rfl: 0    metoprolol tartrate (LOPRESSOR) 25 mg tablet, Take 1 tablet (25 mg total) by mouth 2 (two) times a day, Disp: 60 tablet, Rfl: 0    polyethylene glycol (MIRALAX) 17 g packet, Take 17 g by mouth daily as needed (constipation), Disp: 10 each, Rfl: 0    ruxolitinib (Jakafi) 10 mg tablet, Take 1 tablet (10 mg total) by mouth daily, Disp: 30 tablet, Rfl: 3    saccharomyces boulardii (FLORASTOR) 250 mg capsule, Take 1 capsule by mouth daily  , Disp: , Rfl:     sodium bicarbonate 650 mg tablet, Take 2 tablets (1,300 mg total) by mouth 2 (two) times daily after meals, Disp: 60 tablet, Rfl: 0    sodium chloride, PF, 0 9 %, 10 mL by Intracatheter route daily Intracatheter flushing daily, Disp: 300 mL, Rfl: 0    sodium chloride, PF, 0 9 %, 10 mL by Intracatheter route daily Intracatheter flushing daily, Disp: 300 mL, Rfl: 6

## 2021-06-11 NOTE — PATIENT INSTRUCTIONS
1) CKD  -we did surgery to create a fistula to help get you prepared for eventual dialysis  -you also had a fistulogram where they opened up some narrowings in the vein in your chest to help with the swelling  -we need to get an ultrasound to check the maturity or growth of the fistula  -if the fistula is maturing well, we will plan the second stage surgery for your fistula to move it to the surface and the top of your arm  -DO NOT GET ANY BLOOD DRAWS OR IVS IN THE RIGHT ARM FROM NOW ON    Arteriovenous Fistula Creation for Hemodialysis   WHAT YOU NEED TO KNOW:   What do I need to know about an arteriovenous fistula (AVF) creation? An AVF creation is surgery to connect an artery to a vein  This surgery is done so you can receive hemodialysis  The AVF is usually placed in your forearm or upper arm  How do I prepare for an AVF creation? · Your surgeon will talk to you about how to prepare for surgery  You may need an ultrasound of your arm before surgery  An ultrasound will help your surgeon decide which artery and vein he will use to create your AVF  You may need to stop taking blood thinners 1 week before surgery  · Your surgeon may tell you not to eat or drink anything after midnight on the day of your surgery  He will tell you what medicines to take or not take on the day of your surgery  You may be given an antibiotic through your IV to help prevent a bacterial infection  Tell a healthcare provider if you have ever had an allergic reaction to an antibiotic  Ask someone to drive you home and stay with you after surgery  What will happen during an AVF creation? You may be given general anesthesia to keep you asleep and free from pain during surgery  You may instead be given local or regional anesthesia to numb the surgery area  With local or regional anesthesia, you may still feel pressure or pushing during surgery, but you should not feel any pain  Your surgeon will make an incision in your arm   He will connect your artery and vein with stitches  Your incision will be closed with stitches and covered with a bandage  What will happen after an AVF creation? · Healthcare providers will monitor you until you are awake  They will feel the area over your AVF for a thrill, and listen for a bruit  A thrill is a vibration, and a bruit is a humming noise  The presence of a bruit and a thrill mean that blood is moving through your AVF properly  A healthcare provider will show you how to feel for a thrill  · Your arm may feel sore for several days after your surgery  You may have mild bruising or swelling near your wound  Your wound may drain a few drops of blood or pink fluid for 24 hours  Your AVF will take 2 to 3 months to heal  After this time, it can be used for hemodialysis  What are the risks of an AVF creation? You may bleed more than expected or get an infection  Your AVF may become narrowed or blocked  This may slow or stop blood flow through your AVF, or to your arm or hand  You may need surgery to fix this or create another AVF  You may get a blood clot in your arm or leg  This may become life-threatening  CARE AGREEMENT:   You have the right to help plan your care  Learn about your health condition and how it may be treated  Discuss treatment options with your caregivers to decide what care you want to receive  You always have the right to refuse treatment  The above information is an  only  It is not intended as medical advice for individual conditions or treatments  Talk to your doctor, nurse or pharmacist before following any medical regimen to see if it is safe and effective for you  © 2017 [facility] Information is for End User's use only and may not be sold, redistributed or otherwise used for commercial purposes  All illustrations and images included in CareNotes® are the copyrighted property of [employer]  or Venu Delgado

## 2021-06-16 ENCOUNTER — HOSPITAL ENCOUNTER (OUTPATIENT)
Dept: RADIOLOGY | Facility: HOSPITAL | Age: 76
Discharge: HOME | End: 2021-06-16
Attending: RADIOLOGY
Payer: COMMERCIAL

## 2021-06-16 DIAGNOSIS — N13.9 OBSTRUCTIVE UROPATHY: ICD-10-CM

## 2021-06-16 PROCEDURE — 50389 REMOVE RENAL TUBE W/FLUORO: CPT | Performed by: RADIOLOGY

## 2021-06-16 PROCEDURE — 50431 NJX PX NFROSGRM &/URTRGRM: CPT

## 2021-06-16 RX ADMIN — IOHEXOL 10 ML: 350 INJECTION, SOLUTION INTRAVENOUS at 10:10

## 2021-06-16 NOTE — BRIEF OP NOTE (RAD/CATH)
INTERVENTIONAL RADIOLOGY PROCEDURE NOTE    Date: 6/16/2021    Procedure: IR NEPHROSTOMY TUBE CHECK AND/OR REMOVAL    Preoperative diagnosis:   1  Obstructive uropathy         Postoperative diagnosis: Same  Surgeon: Sonia Bearden MD     Assistant: None  No qualified resident was available  Blood loss: 0    Specimens: 0     Findings: ureter/PCNU is patent    PCN removed    Complications: None immediate      Anesthesia: none

## 2021-06-17 ENCOUNTER — APPOINTMENT (OUTPATIENT)
Dept: LAB | Facility: CLINIC | Age: 76
End: 2021-06-17
Payer: COMMERCIAL

## 2021-06-17 DIAGNOSIS — D63.1 ANEMIA IN STAGE 5 CHRONIC KIDNEY DISEASE, NOT ON CHRONIC DIALYSIS (HCC): ICD-10-CM

## 2021-06-17 DIAGNOSIS — N18.5 ANEMIA IN STAGE 5 CHRONIC KIDNEY DISEASE, NOT ON CHRONIC DIALYSIS (HCC): ICD-10-CM

## 2021-06-17 LAB
BASOPHILS # BLD AUTO: 0.04 THOUSANDS/ΜL (ref 0–0.1)
BASOPHILS NFR BLD AUTO: 1 % (ref 0–1)
EOSINOPHIL # BLD AUTO: 0.06 THOUSAND/ΜL (ref 0–0.61)
EOSINOPHIL NFR BLD AUTO: 1 % (ref 0–6)
ERYTHROCYTE [DISTWIDTH] IN BLOOD BY AUTOMATED COUNT: 15.6 % (ref 11.6–15.1)
HCT VFR BLD AUTO: 30 % (ref 34.8–46.1)
HGB BLD-MCNC: 9.2 G/DL (ref 11.5–15.4)
IMM GRANULOCYTES # BLD AUTO: 0.03 THOUSAND/UL (ref 0–0.2)
IMM GRANULOCYTES NFR BLD AUTO: 1 % (ref 0–2)
LYMPHOCYTES # BLD AUTO: 1.46 THOUSANDS/ΜL (ref 0.6–4.47)
LYMPHOCYTES NFR BLD AUTO: 26 % (ref 14–44)
MCH RBC QN AUTO: 29.4 PG (ref 26.8–34.3)
MCHC RBC AUTO-ENTMCNC: 30.7 G/DL (ref 31.4–37.4)
MCV RBC AUTO: 96 FL (ref 82–98)
MONOCYTES # BLD AUTO: 0.41 THOUSAND/ΜL (ref 0.17–1.22)
MONOCYTES NFR BLD AUTO: 7 % (ref 4–12)
NEUTROPHILS # BLD AUTO: 3.52 THOUSANDS/ΜL (ref 1.85–7.62)
NEUTS SEG NFR BLD AUTO: 64 % (ref 43–75)
NRBC BLD AUTO-RTO: 0 /100 WBCS
PLATELET # BLD AUTO: 333 THOUSANDS/UL (ref 149–390)
PMV BLD AUTO: 10 FL (ref 8.9–12.7)
RBC # BLD AUTO: 3.13 MILLION/UL (ref 3.81–5.12)
WBC # BLD AUTO: 5.52 THOUSAND/UL (ref 4.31–10.16)

## 2021-06-17 PROCEDURE — 36415 COLL VENOUS BLD VENIPUNCTURE: CPT

## 2021-06-17 PROCEDURE — 85025 COMPLETE CBC W/AUTO DIFF WBC: CPT

## 2021-06-18 DIAGNOSIS — D45 POLYCYTHEMIA VERA (HCC): ICD-10-CM

## 2021-06-18 RX ORDER — RUXOLITINIB 10 MG/1
TABLET ORAL
Qty: 30 TABLET | Refills: 3 | Status: SHIPPED | OUTPATIENT
Start: 2021-06-18 | End: 2021-10-05 | Stop reason: HOSPADM

## 2021-06-21 ENCOUNTER — HOSPITAL ENCOUNTER (OUTPATIENT)
Dept: INFUSION CENTER | Facility: HOSPITAL | Age: 76
Discharge: HOME | End: 2021-06-21
Attending: INTERNAL MEDICINE
Payer: COMMERCIAL

## 2021-06-21 VITALS — DIASTOLIC BLOOD PRESSURE: 84 MMHG | SYSTOLIC BLOOD PRESSURE: 152 MMHG | TEMPERATURE: 97.3 F

## 2021-06-21 DIAGNOSIS — D63.1 ANEMIA IN STAGE 5 CHRONIC KIDNEY DISEASE, NOT ON CHRONIC DIALYSIS (HCC): Primary | ICD-10-CM

## 2021-06-21 DIAGNOSIS — N18.5 ANEMIA IN STAGE 5 CHRONIC KIDNEY DISEASE, NOT ON CHRONIC DIALYSIS (HCC): Primary | ICD-10-CM

## 2021-06-21 PROCEDURE — 96372 THER/PROPH/DIAG INJ SC/IM: CPT

## 2021-06-21 RX ADMIN — DARBEPOETIN ALFA 100 MCG: 100 INJECTION, SOLUTION INTRAVENOUS; SUBCUTANEOUS at 10:20

## 2021-06-21 NOTE — PROGRESS NOTES
Ptnt tolerated Aranesp injection without incident  Ptnt aware of next appt and declines AVS at this time

## 2021-06-21 NOTE — PLAN OF CARE
Problem: Potential for Falls  Goal: Patient will remain free of falls  Description: INTERVENTIONS:  - Educate patient/family on patient safety including physical limitations  - Instruct patient to call for assistance with activity   - Consult OT/PT to assist with strengthening/mobility   - Keep Call bell within reach  - Keep bed low and locked with side rails adjusted as appropriate  - Keep care items and personal belongings within reach  - Initiate and maintain comfort rounds  - Make Fall Risk Sign visible to staff  - Apply yellow socks and bracelet for high fall risk patients  - Consider moving patient to room near nurses station  Outcome: Progressing

## 2021-07-01 ENCOUNTER — APPOINTMENT (OUTPATIENT)
Dept: LAB | Facility: CLINIC | Age: 76
End: 2021-07-01
Payer: COMMERCIAL

## 2021-07-01 DIAGNOSIS — N18.5 STAGE 5 CHRONIC KIDNEY DISEASE NOT ON CHRONIC DIALYSIS (HCC): ICD-10-CM

## 2021-07-01 LAB
ALBUMIN SERPL BCP-MCNC: 3.3 G/DL (ref 3.5–5)
ANION GAP SERPL CALCULATED.3IONS-SCNC: 8 MMOL/L (ref 4–13)
BUN SERPL-MCNC: 55 MG/DL (ref 5–25)
CALCIUM ALBUM COR SERPL-MCNC: 9.2 MG/DL (ref 8.3–10.1)
CALCIUM SERPL-MCNC: 8.6 MG/DL (ref 8.3–10.1)
CHLORIDE SERPL-SCNC: 118 MMOL/L (ref 100–108)
CO2 SERPL-SCNC: 14 MMOL/L (ref 21–32)
CREAT SERPL-MCNC: 3.86 MG/DL (ref 0.6–1.3)
ERYTHROCYTE [DISTWIDTH] IN BLOOD BY AUTOMATED COUNT: 15.8 % (ref 11.6–15.1)
GFR SERPL CREATININE-BSD FRML MDRD: 11 ML/MIN/1.73SQ M
GLUCOSE P FAST SERPL-MCNC: 92 MG/DL (ref 65–99)
HCT VFR BLD AUTO: 31.4 % (ref 34.8–46.1)
HGB BLD-MCNC: 9.9 G/DL (ref 11.5–15.4)
MCH RBC QN AUTO: 29.8 PG (ref 26.8–34.3)
MCHC RBC AUTO-ENTMCNC: 31.5 G/DL (ref 31.4–37.4)
MCV RBC AUTO: 95 FL (ref 82–98)
PHOSPHATE SERPL-MCNC: 4.7 MG/DL (ref 2.3–4.1)
PLATELET # BLD AUTO: 303 THOUSANDS/UL (ref 149–390)
PMV BLD AUTO: 10.1 FL (ref 8.9–12.7)
POTASSIUM SERPL-SCNC: 4.3 MMOL/L (ref 3.5–5.3)
PTH-INTACT SERPL-MCNC: 360.5 PG/ML (ref 18.4–80.1)
RBC # BLD AUTO: 3.32 MILLION/UL (ref 3.81–5.12)
SODIUM SERPL-SCNC: 140 MMOL/L (ref 136–145)
WBC # BLD AUTO: 5.02 THOUSAND/UL (ref 4.31–10.16)

## 2021-07-01 PROCEDURE — 83970 ASSAY OF PARATHORMONE: CPT

## 2021-07-01 PROCEDURE — 36415 COLL VENOUS BLD VENIPUNCTURE: CPT

## 2021-07-01 PROCEDURE — 80069 RENAL FUNCTION PANEL: CPT

## 2021-07-01 PROCEDURE — 85027 COMPLETE CBC AUTOMATED: CPT

## 2021-07-06 ENCOUNTER — HOSPITAL ENCOUNTER (OUTPATIENT)
Dept: NON INVASIVE DIAGNOSTICS | Facility: CLINIC | Age: 76
Discharge: HOME | End: 2021-07-06
Payer: COMMERCIAL

## 2021-07-06 DIAGNOSIS — N18.5 STAGE 5 CHRONIC KIDNEY DISEASE NOT ON CHRONIC DIALYSIS (HCC): ICD-10-CM

## 2021-07-06 DIAGNOSIS — M79.89 SWELLING OF RIGHT UPPER EXTREMITY: ICD-10-CM

## 2021-07-06 PROCEDURE — 93990 DOPPLER FLOW TESTING: CPT

## 2021-07-06 NOTE — PROGRESS NOTES
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 functional solitary R kidney and prior episodes of LUANN   · Baseline creatinine low to mid 3's  · Recent creatinine on 7/1 was 3 86 which is at the upper end of her baseline but she has no uremic symptoms, urine output is normal and electrolytes are acceptable so no need to start dialysis  · We discussed the uremic symptoms to look for and that she should call us if she is not feeling well  · She did ask about home hemodialysis which we discussed but she does not have a support person  2  Hypertension:  Blood pressure acceptable today  Continue metoprolol 25 mg twice a day  3  History nonfunctional bladder also bilateral hydronephrosis status post cystectomy with ileal conduit:  4  Anemia of CKD: on aranesp for heme/onc  Hgb 9 9 on 7/1   5  Mineral Bone Disease of CKD: Labs on 7/1 with  and phos 4 7   · Will increase calcitriol from 3 times a week to 0 25 mcg daily  6  Metabolic acidosis: recent bicarb 14 on 7/1   She was not taking any bicarb as she reports this caused swelling in the past (was on 1300mg bid) but she also has not noticed an improvement in her swelling since stopping it  · She does have pedal edema but it is nonpitting and she noticed that it has been worse with the humidity  · She agrees to try sodium bicarb 650 mg twice a day for now and will call if her swelling gets worse  7  Polycythemia vera with history of PE  8  Access: s/p R brachiobasilic AVF placed 8/8/48 by Dr Chairez   · She had a duplex yesterday and has follow-up with vascular tomorrow to discuss plan for 2nd step surgery    Overall she is stable with no indication to start dialysis at this time  She does know to call us if she is not feeling well  Restart sodium bicarb and increase calcitriol    Follow up 2-3 months with Dr Toni Edward with repeat labs in 2 months  HPI: Angie Tenorio is a 77 y o  female who is here for CKD Follow up   Patient has been feeling well recently  She denies any recent chest pain, shortness of breath, nausea, vomiting or diarrhea  Her ileal conduit output has been normal and she has not noticed any decreased urine output  She does complain of some pedal edema but has been worse since the humidity started  She previously had stopped her oral sodium bicarb as she thought this was contributing to the swelling but has not noticed an improvement since stopping the bicarb  ROS:   A complete review of systems was done  Pertinent positives and negatives as noted in the HPI, otherwise the review of systems is negative  Allergies: Chlorhexidine    Medications:   Current Outpatient Medications:     acetaminophen (TYLENOL) 325 mg tablet, 650 mg every 6 hours as needed for mild pain or headache, Disp: 30 tablet, Rfl: 0    atorvastatin (LIPITOR) 40 mg tablet, Take 1 tablet (40 mg total) by mouth daily (Patient taking differently: Take 40 mg by mouth daily at bedtime ), Disp: 90 tablet, Rfl: 6    calcitriol (ROCALTROL) 0 25 mcg capsule, Take 1 capsule (0 25 mcg total) by mouth daily, Disp: 30 capsule, Rfl: 5    Cholecalciferol (VITAMIN D3) 1000 UNITS CAPS, Take 1,000 unit marking on U-100 syringe by mouth daily  , Disp: , Rfl:     citalopram (CeleXA) 20 mg tablet, Take 20 mg by mouth daily , Disp: , Rfl:     docusate sodium (COLACE) 100 mg capsule, Take 1 capsule (100 mg total) by mouth 2 (two) times a day, Disp: 10 capsule, Rfl: 0    Eliquis 2 5 MG, TAKE 1 TABLET BY MOUTH TWICE A DAY (Patient taking differently: 2 5 mg daily ), Disp: 60 tablet, Rfl: 5    Jakafi 10 MG tablet, TAKE 1 TABLET (10 MG TOTAL) BY MOUTH DAILY, Disp: 30 tablet, Rfl: 3    levothyroxine 75 mcg tablet, Take 75 mcg by mouth daily, Disp: , Rfl: 3    loperamide (IMODIUM) 2 mg capsule, Take 1 capsule (2 mg total) by mouth 4 (four) times a day as needed for diarrhea, Disp: 12 capsule, Rfl: 0    melatonin 3 mg, Take 1 tablet (3 mg total) by mouth daily at bedtime, Disp: 30 tablet, Rfl: 0    metoprolol tartrate (LOPRESSOR) 25 mg tablet, Take 1 tablet (25 mg total) by mouth 2 (two) times a day, Disp: 60 tablet, Rfl: 0    polyethylene glycol (MIRALAX) 17 g packet, Take 17 g by mouth daily as needed (constipation), Disp: 10 each, Rfl: 0    saccharomyces boulardii (FLORASTOR) 250 mg capsule, Take 1 capsule by mouth daily  , Disp: , Rfl:     sodium chloride, PF, 0 9 %, 10 mL by Intracatheter route daily Intracatheter flushing daily, Disp: 300 mL, Rfl: 6    sodium bicarbonate 650 mg tablet, Take 1 tablet (650 mg total) by mouth 2 (two) times daily after meals, Disp: 60 tablet, Rfl: 5    sodium chloride, PF, 0 9 %, 10 mL by Intracatheter route daily Intracatheter flushing daily, Disp: 300 mL, Rfl: 0    Past Medical History:   Diagnosis Date    Anxiety     Bowel obstruction (HCC)     Chronic kidney disease (CKD), stage IV (severe) (HCC)     stage IV    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    Circulation problem     Compression fracture of cervical spine (HCC)     Hernia of abdominal cavity     History of kidney problems     Hydronephrosis     Hypertension     IBS (irritable bowel syndrome)     Incontinence     Lung mass     Improving on PET/CT 1/2016    [facility]cy[facility]mia vera ([facility] [address] )     Pulmonary embolism ([facility] [address] ) 2014    Shingles     Urinary tract infection      Past Surgical History:   Procedure Laterality Date    ABDOMINAL ADHESION SURGERY N/A 8/9/2020    Procedure: LYSIS ADHESIONS;  Surgeon: Michael Salomon DO;  [facility];  Service: General    BLADDER SUSPENSION      BOTOX INJECTION N/A 7/27/2016    Procedure: BOTOX INJECTION ;  Surgeon: Guillaume Rivera MD;  [facility];  Service:     CHOLECYSTECTOMY N/A     COLONOSCOPY      CYSTECTOMY, RADICAL WITH ILEOCONDUIT N/A 10/4/2016    Procedure: SUPRATRIGONAL CYSTECTOMY WITH ILEAL CONDUIT ;  Surgeon: Lola William MD;  [facility];  Service:    [place] W/ RETROGRADES Bilateral 7/27/2016    Procedure: CYSTOSCOPY; RETROGRADE PYELOGRAM ;  Surgeon: Lola William MD;  [facility];  Service:     HERNIA REPAIR      IR AV FISTULAGRAM/GRAFTOGRAM  2/10/2021    IR NEPHROSTOMY TO NEPHROURETERAL STENT  5/15/2021    IR NEPHROSTOMY TO NEPHROURETERAL STENT  6/9/2021    IR NEPHROSTOMY TUBE CHECK AND/OR REMOVAL  6/16/2021    IR NEPHROSTOMY TUBE CHECK/CHANGE/REPOSITION/REINSERTION/UPSIZE  5/14/2021    IR NEPHROSTOMY TUBE CHECK/CHANGE/REPOSITION/REINSERTION/UPSIZE  5/21/2021    IR NEPHROSTOMY TUBE PLACEMENT  5/10/2021    IR NON-TUNNELED CENTRAL LINE PLACEMENT  7/17/2020    IR NON-TUNNELED CENTRAL LINE PLACEMENT  8/14/2020    LAPAROTOMY N/A 8/9/2020    Procedure: LAPAROTOMY EXPLORATORY, PARASTOMAL HERNIA REPAIR WITH MESH;  Surgeon: Tre Barker DO;  [facility];  Service: General    MD ANASTOMOSIS,AV,ANY SITE Right 1/5/2021    Procedure: Creation of right brachiobasilic fistula; Surgeon: Phyllis Chairez MD;  [facility];  Service: Vascular    MD COLONOSCOPY FLX DX W/COLLJ SPEC WHEN PFRMD N/A 8/31/2016    Procedure: COLONOSCOPY;  Surgeon: Mathilda Duverney, MD;  [facility]; Service: Gastroenterology    MD CYSTOSCOPY,INSERT URETERAL STENT Bilateral 7/27/2016    Procedure: STENT INSERTION; EXCISION OF MESH ;  Surgeon: Lloa William MD;  [facility];  Service: Urology    TONSILLECTOMY      TUBAL LIGATION      WISDOM TOOTH EXTRACTION       Family History   Problem Relation Age of Onset    Cancer Mother         small cell cancer     Heart disease Father     COPD Father     Heart disease Brother     Nephrolithiasis Brother       reports that she has never smoked  She has never used smokeless tobacco  She reports previous alcohol use  She reports previous drug use        Physical Exam:   Vitals:    07/07/21 1422   BP: 136/82 Pulse: 63   Resp: 16   Weight: 88 9 kg (196 lb)   Height: 5' (1 524 m)     Body mass index is 38 28 kg/m²  General: no acute distress   Eyes: conjunctivae pink, anicteric sclerae  ENT: mucous membranes moist  Neck: supple, no JVD  Chest: clear to auscultation bilaterally with no wheezes, rale or rhochi  CVS: regular rate and rhythm   Abdomen: soft, non-tender, non-distended  Extremities:  Nonpitting swelling in feet bilaterally, right upper extremity AV fistula with good bruit and thrill  Skin: no rash  Neuro: awake and alert       Lab Results:  Results for orders placed or performed in visit on 07/01/21   CBC   Result Value Ref Range    WBC 5 02 4 31 - 10 16 Thousand/uL    RBC 3 32 (L) 3 81 - 5 12 Million/uL    Hemoglobin 9 9 (L) 11 5 - 15 4 g/dL    Hematocrit 31 4 (L) 34 8 - 46 1 %    MCV 95 82 - 98 fL    MCH 29 8 26 8 - 34 3 pg    MCHC 31 5 31 4 - 37 4 g/dL    RDW 15 8 (H) 11 6 - 15 1 %    Platelets 338 014 - 637 Thousands/uL    MPV 10 1 8 9 - 12 7 fL   Renal function panel   Result Value Ref Range    Albumin 3 3 (L) 3 5 - 5 0 g/dL    Calcium 8 6 8 3 - 10 1 mg/dL    Corrected Calcium 9 2 8 3 - 10 1 mg/dL    Phosphorus 4 7 (H) 2 3 - 4 1 mg/dL    BUN 55 (H) 5 - 25 mg/dL    Creatinine 3 86 (H) 0 60 - 1 30 mg/dL    Sodium 140 136 - 145 mmol/L    Potassium 4 3 3 5 - 5 3 mmol/L    Chloride 118 (H) 100 - 108 mmol/L    CO2 14 (L) 21 - 32 mmol/L    ANION GAP 8 4 - 13 mmol/L    eGFR 11 ml/min/1 73sq m    Glucose, Fasting 92 65 - 99 mg/dL   PTH, intact   Result Value Ref Range     5 (H) 18 4 - 80 1 pg/mL       Results from last 7 days   Lab Units 07/01/21  0846   WBC Thousand/uL 5 02   HEMOGLOBIN g/dL 9 9*   HEMATOCRIT % 31 4*   PLATELETS Thousands/uL 303   SODIUM mmol/L 140   POTASSIUM mmol/L 4 3   CHLORIDE mmol/L 118*   CO2 mmol/L 14*   BUN mg/dL 55*   CREATININE mg/dL 3 86*   CALCIUM mg/dL 8 6   PHOSPHORUS mg/dL 4 7*         Portions of the record may have been created with voice recognition software  Occasional wrong word or "sound a like" substitutions may have occurred due to the inherent limitations of voice recognition software  Read the chart carefully and recognize, using context, where substitutions have occurred  If you have any questions, please contact the dictating provider

## 2021-07-07 ENCOUNTER — OFFICE VISIT (OUTPATIENT)
Dept: NEPHROLOGY | Facility: CLINIC | Age: 76
End: 2021-07-07
Payer: COMMERCIAL

## 2021-07-07 VITALS
HEIGHT: 60 IN | DIASTOLIC BLOOD PRESSURE: 82 MMHG | HEART RATE: 63 BPM | BODY MASS INDEX: 38.48 KG/M2 | SYSTOLIC BLOOD PRESSURE: 136 MMHG | RESPIRATION RATE: 16 BRPM | WEIGHT: 196 LBS

## 2021-07-07 DIAGNOSIS — I10 ESSENTIAL HYPERTENSION: ICD-10-CM

## 2021-07-07 DIAGNOSIS — N18.5 ANEMIA IN STAGE 5 CHRONIC KIDNEY DISEASE, NOT ON CHRONIC DIALYSIS (HCC): ICD-10-CM

## 2021-07-07 DIAGNOSIS — N18.5 STAGE 5 CHRONIC KIDNEY DISEASE NOT ON CHRONIC DIALYSIS (HCC): Primary | ICD-10-CM

## 2021-07-07 DIAGNOSIS — D63.1 ANEMIA IN STAGE 5 CHRONIC KIDNEY DISEASE, NOT ON CHRONIC DIALYSIS (HCC): ICD-10-CM

## 2021-07-07 DIAGNOSIS — N25.81 SECONDARY HYPERPARATHYROIDISM OF RENAL ORIGIN (HCC): ICD-10-CM

## 2021-07-07 DIAGNOSIS — N13.9 OBSTRUCTIVE UROPATHY: ICD-10-CM

## 2021-07-07 PROCEDURE — 99214 OFFICE O/P EST MOD 30 MIN: CPT | Performed by: PHYSICIAN ASSISTANT

## 2021-07-07 RX ORDER — SODIUM BICARBONATE 650 MG/1
650 TABLET ORAL
Qty: 60 TABLET | Refills: 5 | Status: SHIPPED | OUTPATIENT
Start: 2021-07-07 | End: 2021-08-03 | Stop reason: HOSPADM

## 2021-07-07 RX ORDER — CALCITRIOL 0.25 UG/1
0.25 CAPSULE, LIQUID FILLED ORAL DAILY
Qty: 30 CAPSULE | Refills: 5 | Status: SHIPPED | OUTPATIENT
Start: 2021-07-07 | End: 2021-08-19 | Stop reason: SDUPTHER

## 2021-07-08 ENCOUNTER — OFFICE VISIT (OUTPATIENT)
Dept: VASCULAR SURGERY | Facility: CLINIC | Age: 76
End: 2021-07-08
Payer: COMMERCIAL

## 2021-07-08 ENCOUNTER — TELEPHONE (OUTPATIENT)
Dept: VASCULAR SURGERY | Facility: CLINIC | Age: 76
End: 2021-07-08

## 2021-07-08 VITALS
WEIGHT: 194 LBS | HEIGHT: 60 IN | DIASTOLIC BLOOD PRESSURE: 74 MMHG | BODY MASS INDEX: 38.09 KG/M2 | TEMPERATURE: 99 F | SYSTOLIC BLOOD PRESSURE: 124 MMHG | HEART RATE: 79 BPM

## 2021-07-08 DIAGNOSIS — N18.5 STAGE 5 CHRONIC KIDNEY DISEASE NOT ON CHRONIC DIALYSIS (HCC): Primary | ICD-10-CM

## 2021-07-08 DIAGNOSIS — I82.B21: ICD-10-CM

## 2021-07-08 PROCEDURE — 93990 DOPPLER FLOW TESTING: CPT | Performed by: SURGERY

## 2021-07-08 PROCEDURE — 99215 OFFICE O/P EST HI 40 MIN: CPT | Performed by: SURGERY

## 2021-07-08 NOTE — TELEPHONE ENCOUNTER
LMOM for Florinda Cevallos to [facility] and schedule superficialization

## 2021-07-08 NOTE — TELEPHONE ENCOUNTER
REMINDER: Under Reason For Call, comments MUST be formatted as:   (Surgeon's Initials) / (Procedure)    Physician / Hospital: Kristen Chairez (NPI: [id number]) / Connie (Tax: [id number] / [place]: [id number]), Berta Lynn (Tax: [id number] / NPI: [id number]) or Danny (Tax: [id number] / NPI: [id number])    Procedure: Sanjiv [place] and transposition of right brachiobasilic fistula    Level: 3 - Route clearance(s) to [facility] Clearance Pool     Equipment / Rep Needs: Unknown    Assistant Surgeon: No    Allergies: Chlorhexidine    Instructions Given: NO Bowel Prep General Instructions     Blood Thinners / Medication Hold: Hold Rx - Eliquis (Apixaban) , 2 days prior to procedure  Hydration Required: Patient does not require hydration  Dialysis: Patient is not on dialysis  Consent: I certify that patient has signed, printed, timed, and dated their surgery consent  I certify that BOTH sides of the completed surgery consent have been scanned into the patient's Epic chart by myself on 7/8/2021  Yes, I have LABELED the consent in Epic as Consent for Vascular Procedure  Clearances     Levels   1-3 ROUTE this encounter to [facility] Clearance Pool   AND   SEND Clearance Form(s) to Vascular Nursing e-mail group   Level   4 ROUTE this encounter to [facility] Surgery Coordinator Pool  AND   SEND Clearance Form(s) to Vascular Surgery Schedulers e-mail group     Patient does not require any pre operative clearance  Yes, I have ROUTED this encounter to [facility] Surgery Coordinator and/or [facility] Clearance Pool

## 2021-07-08 NOTE — PROGRESS NOTES
Assessment/Plan:    Pt is a 76 yo F w/ bladder surgery '16, c/b incarcerated parastomal hernia s/p ex lap 8/20, recurrant SBO, hypothyroidism, hyperparathyroidism, hx of PE '06 (on ppx dose eliquis), HTN, meningioma, MDD, polycythemia vera, hx of SDH after fall?, CKD, presents for dialysis access evaluation  Stage 5 chronic kidney disease not on chronic dialysis (HCC)  Chronic thrombosis of right subclavian vein ([address] )  -     Case request operating room: Superficialization and transposition of right brachiobasilic fistula; Standing  -     Case request operating room: Superficialization and transposition of right brachiobasilic fistula  -reviewed vein mapping which shows chronic SVT in the B cephalic veins just above the AC fossa; L basilic is inadequate size; R basilic appears adequate size in the upper arm; brachial bifur at the [facility] fossa; no evidence of R central occlusion  -s/p creation of right brachiobasilic KJIQLVD 8/3/30  -s/p fistulogram w/ R ax/subclavian PTA to 10mm ([place] 2/10/21)  -significant improvement in edema; excellent thrill/bruit  -reviewed fistula DU which shows adequate caliber and 5L flow rate (distal arm, other sites were not listed in report yet)  -plan for second stage; discussed risks and benefits and patient consented; all questions answered  -plan for Superficialization and transposition of right brachiobasilic fistula  -had labs last week; no further testing needed  -[facility] stress with small reversible defect; plan for medical management; placed at "acceptable risk"        Other orders  -     Diet NPO; Sips with meds; Standing  -     Void on call to OR; Standing  -     Insert peripheral IV; Standing  -     Nursing Communication CHG bath, have staff wash entire body (neck down) per pre op bathing protocol   Routine, evening prior to, and day of surgery ; Standing  -     Nursing Communication Swab both nares with Povidone-Iodine solution, EXCLUDE if patient has shellfish/Iodine allergy  Routine, day of surgery, on call to OR ; Standing  -     Place sequential compression device; Standing  -     Shower/scrub; Standing  -     ceFAZolin (ANCEF) 2,000 mg in dextrose 5 % 100 mL IVPB      Operative Scheduling Information:    [facility]    Physician:  Me    Surgery: Superficialization and transposition of right brachiobasilic fistula    Urgency:  Standard    Level:  Level 3: Outpatients to be scheduled for elective surgery than can be delayed up to 4 weeks without reasonable expectation of detriment to patient    Case Length:  Normal    Post-op Bed:  Outpatient    OR Table:  Standard    Equipment Needs:  None    Medication Instructions:  Eliquis:  Hold for 2 days prior to procedure    Hydration:  No      Subjective:      Patient ID: Kodak Philip is a 77 y o  female  Patient is here to rev HD duplex done on 7/06/21  Patient is s/p RUE AVF done on 1/05/21  Patient is not currently on dialysis and there is a thrill and bruit  Patient denies numbness, tingling, or pain  Patient is taking ASA 81mg, Eliquis, and atorvastatin  Patient is a non-smoker  HPI:    Patient presents for followup after RUE fistula creation  Referred by Dr Grace Encarnacion  R-handed  CKD, not yet on HD  Saw nephro yesterday and numbers are stable  Had bladder surgery originally for urinary incontinance '16  Had incarcerated parastomal hernia s/p ex-lap, reduction and parastomal hernia repair with Phasix mesh  Has had prolonged recovery from this  Is living at home now, doing home PT  Now walking wtihout a cane/walker today  Has a grown autistic son in his 45s who she cares for  Earlier this year, she had a SBO and this was treated medically  She also had nephrostomy tubes and needed tube exchanges  She is s/p R brachiobasilic fistula  Has hx of 2 lines to the R neck, one in July '20 and one in Aug '20    She underwent fistulogram with treatment of central stenosis in Feb   Her swelling is improved but not completely resolved  Denies any hand symptoms, denies numbness/tingling/weakness  Denies CP or cardiac history  Complains of SOB with activity  The following portions of the patient's history were reviewed and updated as appropriate: allergies, current medications, past family history, past medical history, past social history, past surgical history and problem list     Review of Systems   Constitutional: Negative  HENT: Negative  Eyes: Negative  Respiratory: Positive for shortness of breath (with activity)  Cardiovascular: Positive for leg swelling  Negative for chest pain  Gastrointestinal: Negative  Endocrine: Negative  Genitourinary: Negative  Negative for difficulty urinating  Musculoskeletal: Negative  Negative for gait problem (now walking independently)  Skin: Negative  Negative for wound  Allergic/Immunologic: Negative  Neurological: Negative  Hematological: Negative  Psychiatric/Behavioral: Negative  Objective:      /74 (BP Location: Left arm, Patient Position: Sitting, Cuff Size: Standard)   Pulse 79   Temp 99 °F (37 2 °C) (Tympanic)   Ht 5' (1 524 m)   Wt 88 kg (194 lb)   BMI 37 89 kg/m²          Physical Exam  Vitals and nursing note reviewed  Constitutional:       Appearance: She is well-developed  HENT:      Head: Normocephalic and atraumatic  Eyes:      Conjunctiva/sclera: Conjunctivae normal    Cardiovascular:      Rate and Rhythm: Normal rate and regular rhythm  Pulses:           Radial pulses are 2+ on the right side and 2+ on the left side  Dorsalis pedis pulses are 2+ on the right side and 2+ on the left side  Posterior tibial pulses are 0 on the right side and 0 on the left side  Heart sounds: Murmur heard          Comments: No carotid bruits B    R B/B fistula w/ excellent thrill and bruit  Pulmonary:      Effort: Pulmonary effort is normal  No respiratory distress  Breath sounds: Normal breath sounds  No wheezing or rales  Abdominal:      General: There is no distension  Palpations: Abdomen is soft  Tenderness: There is no abdominal tenderness  There is no rebound  Musculoskeletal:         General: Normal range of motion  Cervical back: Normal range of motion and neck supple  Right lower le+ Edema present  Left lower le+ Edema present  Skin:     General: Skin is warm and dry  Comments: R anticub incision well healed  Mild edema of the R hand, forearm, upper arm improved from prior  Scattered ecchymoses BUE, worst on the LUE   Neurological:      Mental Status: She is alert and oriented to person, place, and time  Psychiatric:         Behavior: Behavior normal            I have reviewed and made appropriate changes to the review of systems input by the medical assistant      Vitals:    21 0957   BP: 124/74   BP Location: Left arm   Patient Position: Sitting   Cuff Size: Standard   Pulse: 79   Temp: 99 °F (37 2 °C)   TempSrc: Tympanic   Weight: 88 kg (194 lb)   Height: 5' (1 524 m)       Patient Active Problem List   Diagnosis    Chronic saddle pulmonary embolism (HCC)    Bladder mass    Small bowel obstruction (HCC)    Obstructive uropathy    Secondary hyperparathyroidism of renal origin ([address] )    Hypertension    Polycythemia vera (HCC)    Intraventricular hemorrhage (HCC)    Anemia in CKD (chronic kidney disease)    Mass of urethra    Stage 5 chronic kidney disease not on chronic dialysis ([address] )    Thoracic compression fracture (HCC)    Benign neoplasm of supratentorial region of brain ([address] )    Meningioma (HCC)    Inverted T wave    Polycythemia    History of Clostridioides difficile colitis    History of shingles    Depression    Adult BMI 39 0-39 9 kg/sq m    Chronic thrombosis of subclavian vein (HCC)    Hyperlipemia    Gross hematuria    Hydronephrosis of left kidney    Anemia    Constipation    Obstructive left pyelonephritis    Right upper quadrant cyst    Obesity, morbid ([address] )       Past Surgical History:   Procedure Laterality Date    ABDOMINAL ADHESION SURGERY N/A 8/9/2020    Procedure: LYSIS ADHESIONS;  Surgeon: Mark Ferguson DO;  [facility];  Service: General    BLADDER SUSPENSION      BOTOX INJECTION N/A 7/27/2016    Procedure: BOTOX INJECTION ;  Surgeon: Desirae Johnson MD;  [facility];  Service:    [address], RADICAL WITH ILEOCONDUIT N/A 10/4/2016    Procedure: Catherine Alvaro WITH ILEAL CONDUIT ;  Surgeon: Desirae Johnson MD;  [facility];  Service:    Ewa Feathers W/ RETROGRADES Bilateral 7/27/2016    Procedure: Teri Course; RETROGRADE PYELOGRAM ;  Surgeon: Desirae Johnson MD;  [facility];  Service:     HERNIA REPAIR      IR AV FISTULAGRAM/GRAFTOGRAM  2/10/2021    IR NEPHROSTOMY TO NEPHROURETERAL STENT  5/15/2021    IR NEPHROSTOMY TO NEPHROURETERAL STENT  6/9/2021    IR NEPHROSTOMY TUBE CHECK AND/OR REMOVAL  6/16/2021    IR NEPHROSTOMY TUBE CHECK/CHANGE/REPOSITION/REINSERTION/UPSIZE  5/14/2021    IR NEPHROSTOMY TUBE CHECK/CHANGE/REPOSITION/REINSERTION/UPSIZE  5/21/2021    IR NEPHROSTOMY TUBE PLACEMENT  5/10/2021    IR NON-TUNNELED CENTRAL LINE PLACEMENT  7/17/2020    IR NON-TUNNELED CENTRAL LINE PLACEMENT  8/14/2020    LAPAROTOMY N/A 8/9/2020    Procedure: LAPAROTOMY EXPLORATORY, PARASTOMAL HERNIA REPAIR WITH MESH;  Surgeon: Mark Ferguson DO;  [facility];  Service: General    NV ANASTOMOSIS,AV,ANY SITE Right 1/5/2021    Procedure: Creation of right brachiobasilic fistula; Surgeon: Abhinav Chairez MD;  [facility];  Service: Vascular    NV COLONOSCOPY FLX DX W/COLLJ SPEC WHEN PFRMD N/A 8/31/2016    Procedure: COLONOSCOPY;  Surgeon: Pearl Andrew MD;  [facility];   Service: Gastroenterology  WV CYSTOSCOPY,INSERT URETERAL STENT Bilateral 7/27/2016    Procedure: STENT INSERTION; EXCISION OF MESH ;  Surgeon: Desirae Johnson MD;  [facility];  Service: Urology    TONSILLECTOMY      TUBAL LIGATION      WISDOM TOOTH EXTRACTION         Family History   Problem Relation Age of Onset    Cancer Mother         small cell cancer     Heart disease Father     COPD Father     Heart disease Brother     Nephrolithiasis Brother        Social History     Socioeconomic History    Marital status:      Spouse name: Not on file    Number of children: Not on file    Years of education: Not on file    Highest education level: Not on file   Occupational History    Not on file   Tobacco Use    Smoking status: Never Smoker    Smokeless tobacco: Never Used    Tobacco comment: n/a   Vaping Use    Vaping Use: Never used   Substance and Sexual Activity    Alcohol use: Not Currently     Comment: n/s    Drug use: Not Currently     Comment: n/a    Sexual activity: Not Currently     Partners: Male   Other Topics Concern    Not on file   Social History Narrative    Not on file     Social Determinants of Health     Financial Resource Strain:     Difficulty of Paying Living Expenses:    Food Insecurity:     Worried About Running Out of Food in the Last Year:     [address] in the Last Year:    Transportation Needs:     Lack of Transportation (Medical):      Lack of Transportation (Non-Medical):    Physical Activity:     Days of Exercise per Week:     Minutes of Exercise per Session:    Stress:     Feeling of Stress :    Social Connections:     Frequency of Communication with Friends and Family:     Frequency of Social Gatherings with Friends and Family:     Attends [employer] Services:     Active Member of Clubs or Organizations:     Attends Club or Organization Meetings:     Marital Status:    Intimate Partner Violence:     Fear of Current or Ex-Partner:     Emotionally Abused:  Physically Abused:     Sexually Abused: Allergies   Allergen Reactions    Chlorhexidine Rash     petichi like rash when using chlorhexidine swabs prior to IV  Current Outpatient Medications:     acetaminophen (TYLENOL) 325 mg tablet, 650 mg every 6 hours as needed for mild pain or headache, Disp: 30 tablet, Rfl: 0    atorvastatin (LIPITOR) 40 mg tablet, Take 1 tablet (40 mg total) by mouth daily (Patient taking differently: Take 40 mg by mouth daily at bedtime ), Disp: 90 tablet, Rfl: 6    calcitriol (ROCALTROL) 0 25 mcg capsule, Take 1 capsule (0 25 mcg total) by mouth daily, Disp: 30 capsule, Rfl: 5    Cholecalciferol (VITAMIN D3) 1000 UNITS CAPS, Take 1,000 unit marking on U-100 syringe by mouth daily  , Disp: , Rfl:     citalopram (CeleXA) 20 mg tablet, Take 20 mg by mouth daily , Disp: , Rfl:     Eliquis 2 5 MG, TAKE 1 TABLET BY MOUTH TWICE A DAY (Patient taking differently: 2 5 mg daily ), Disp: 60 tablet, Rfl: 5    Jakafi 10 MG tablet, TAKE 1 TABLET (10 MG TOTAL) BY MOUTH DAILY, Disp: 30 tablet, Rfl: 3    levothyroxine 75 mcg tablet, Take 75 mcg by mouth daily, Disp: , Rfl: 3    loperamide (IMODIUM) 2 mg capsule, Take 1 capsule (2 mg total) by mouth 4 (four) times a day as needed for diarrhea, Disp: 12 capsule, Rfl: 0    melatonin 3 mg, Take 1 tablet (3 mg total) by mouth daily at bedtime, Disp: 30 tablet, Rfl: 0    metoprolol tartrate (LOPRESSOR) 25 mg tablet, Take 1 tablet (25 mg total) by mouth 2 (two) times a day, Disp: 60 tablet, Rfl: 0    polyethylene glycol (MIRALAX) 17 g packet, Take 17 g by mouth daily as needed (constipation), Disp: 10 each, Rfl: 0    saccharomyces boulardii (FLORASTOR) 250 mg capsule, Take 1 capsule by mouth daily  , Disp: , Rfl:     sodium bicarbonate 650 mg tablet, Take 1 tablet (650 mg total) by mouth 2 (two) times daily after meals, Disp: 60 tablet, Rfl: 5    docusate sodium (COLACE) 100 mg capsule, Take 1 capsule (100 mg total) by mouth 2 (two) times a day (Patient not taking: Reported on 7/8/2021), Disp: 10 capsule, Rfl: 0    sodium chloride, PF, 0 9 %, 10 mL by Intracatheter route daily Intracatheter flushing daily (Patient not taking: Reported on 7/8/2021), Disp: 300 mL, Rfl: 0    sodium chloride, PF, 0 9 %, 10 mL by Intracatheter route daily Intracatheter flushing daily (Patient not taking: Reported on 7/8/2021), Disp: 300 mL, Rfl: 6

## 2021-07-08 NOTE — PATIENT INSTRUCTIONS
1) CKD  -we did surgery to create a fistula to help get you prepared for eventual dialysis  -you also had a fistulogram where they opened up some narrowings in the vein in your chest to help with the swelling  -your ultrasound showed excellent growth/maturity of your fistula  -we are now planing the second stage surgery for your fistula to move it to the surface and the top of your arm  -DO NOT GET ANY BLOOD DRAWS OR IVS IN THE RIGHT ARM FROM NOW ON    Arteriovenous Fistula Creation for Hemodialysis   WHAT YOU NEED TO KNOW:   What do I need to know about an arteriovenous fistula (AVF) creation? An AVF creation is surgery to connect an artery to a vein  This surgery is done so you can receive hemodialysis  The AVF is usually placed in your forearm or upper arm  How do I prepare for an AVF creation? · Your surgeon will talk to you about how to prepare for surgery  You may need an ultrasound of your arm before surgery  An ultrasound will help your surgeon decide which artery and vein he will use to create your AVF  You may need to stop taking blood thinners 1 week before surgery  · Your surgeon may tell you not to eat or drink anything after midnight on the day of your surgery  He will tell you what medicines to take or not take on the day of your surgery  You may be given an antibiotic through your IV to help prevent a bacterial infection  Tell a healthcare provider if you have ever had an allergic reaction to an antibiotic  Ask someone to drive you home and stay with you after surgery  What will happen during an AVF creation? You may be given general anesthesia to keep you asleep and free from pain during surgery  You may instead be given local or regional anesthesia to numb the surgery area  With local or regional anesthesia, you may still feel pressure or pushing during surgery, but you should not feel any pain  Your surgeon will make an incision in your arm   He will connect your artery and vein with stitches  Your incision will be closed with stitches and covered with a bandage  What will happen after an AVF creation? · Healthcare providers will monitor you until you are awake  They will feel the area over your AVF for a thrill, and listen for a bruit  A thrill is a vibration, and a bruit is a humming noise  The presence of a bruit and a thrill mean that blood is moving through your AVF properly  A healthcare provider will show you how to feel for a thrill  · Your arm may feel sore for several days after your surgery  You may have mild bruising or swelling near your wound  Your wound may drain a few drops of blood or pink fluid for 24 hours  Your AVF will take 2 to 3 months to heal  After this time, it can be used for hemodialysis  What are the risks of an AVF creation? You may bleed more than expected or get an infection  Your AVF may become narrowed or blocked  This may slow or stop blood flow through your AVF, or to your arm or hand  You may need surgery to fix this or create another AVF  You may get a blood clot in your arm or leg  This may become life-threatening  CARE AGREEMENT:   You have the right to help plan your care  Learn about your health condition and how it may be treated  Discuss treatment options with your caregivers to decide what care you want to receive  You always have the right to refuse treatment  The above information is an  only  It is not intended as medical advice for individual conditions or treatments  Talk to your doctor, nurse or pharmacist before following any medical regimen to see if it is safe and effective for you  © 2017 [address] Information is for End User's use only and may not be sold, redistributed or otherwise used for commercial purposes  All illustrations and images included in CareNotes® are the copyrighted property of [employer]  or [employer]

## 2021-07-15 ENCOUNTER — APPOINTMENT (EMERGENCY)
Dept: RADIOLOGY | Facility: HOSPITAL | Age: 76
DRG: 177 | End: 2021-07-15
Payer: COMMERCIAL

## 2021-07-15 ENCOUNTER — HOSPITAL ENCOUNTER (INPATIENT)
Facility: HOSPITAL | Age: 76
LOS: 19 days | Discharge: NURSING, REHAB OR LONG-TERM CARE | DRG: 177 | End: 2021-08-03
Attending: EMERGENCY MEDICINE | Admitting: INTERNAL MEDICINE
Payer: COMMERCIAL

## 2021-07-15 DIAGNOSIS — U07.1 COVID-19: ICD-10-CM

## 2021-07-15 DIAGNOSIS — I26.92 CHRONIC SADDLE PULMONARY EMBOLISM WITHOUT ACUTE COR PULMONALE (HCC): Chronic | ICD-10-CM

## 2021-07-15 DIAGNOSIS — R50.9 FEVER: ICD-10-CM

## 2021-07-15 DIAGNOSIS — D45 POLYCYTHEMIA VERA (HCC): ICD-10-CM

## 2021-07-15 DIAGNOSIS — R53.1 GENERALIZED WEAKNESS: Primary | ICD-10-CM

## 2021-07-15 DIAGNOSIS — U07.1 COVID-19 VIRUS INFECTION: ICD-10-CM

## 2021-07-15 DIAGNOSIS — N39.0 UTI (URINARY TRACT INFECTION): ICD-10-CM

## 2021-07-15 DIAGNOSIS — E78.5 HYPERLIPIDEMIA, UNSPECIFIED HYPERLIPIDEMIA TYPE: ICD-10-CM

## 2021-07-15 DIAGNOSIS — I27.82 CHRONIC SADDLE PULMONARY EMBOLISM WITHOUT ACUTE COR PULMONALE (HCC): Chronic | ICD-10-CM

## 2021-07-15 DIAGNOSIS — N18.5 ACUTE RENAL FAILURE SUPERIMPOSED ON STAGE 5 CHRONIC KIDNEY DISEASE, NOT ON CHRONIC DIALYSIS (HCC): ICD-10-CM

## 2021-07-15 DIAGNOSIS — N17.9 ACUTE RENAL FAILURE SUPERIMPOSED ON STAGE 5 CHRONIC KIDNEY DISEASE, NOT ON CHRONIC DIALYSIS (HCC): ICD-10-CM

## 2021-07-15 DIAGNOSIS — N18.4 STAGE 4 CHRONIC KIDNEY DISEASE (HCC): ICD-10-CM

## 2021-07-15 DIAGNOSIS — J96.01 ACUTE RESPIRATORY FAILURE WITH HYPOXIA (HCC): ICD-10-CM

## 2021-07-15 LAB
ALBUMIN SERPL BCP-MCNC: 3 G/DL (ref 3.5–5)
ALP SERPL-CCNC: 56 U/L (ref 46–116)
ALT SERPL W P-5'-P-CCNC: 13 U/L (ref 12–78)
AMORPH PHOS CRY URNS QL MICRO: ABNORMAL /HPF
ANION GAP SERPL CALCULATED.3IONS-SCNC: 12 MMOL/L (ref 4–13)
AST SERPL W P-5'-P-CCNC: 39 U/L (ref 5–45)
ATRIAL RATE: 78 BPM
BACTERIA UR QL AUTO: ABNORMAL /HPF
BASOPHILS # BLD AUTO: 0.01 THOUSANDS/ΜL (ref 0–0.1)
BASOPHILS NFR BLD AUTO: 0 % (ref 0–1)
BILIRUB SERPL-MCNC: 0.48 MG/DL (ref 0.2–1)
BILIRUB UR QL STRIP: ABNORMAL
BUN SERPL-MCNC: 44 MG/DL (ref 5–25)
CALCIUM ALBUM COR SERPL-MCNC: 9.5 MG/DL (ref 8.3–10.1)
CALCIUM SERPL-MCNC: 8.7 MG/DL (ref 8.3–10.1)
CHLORIDE SERPL-SCNC: 110 MMOL/L (ref 100–108)
CLARITY UR: ABNORMAL
CO2 SERPL-SCNC: 13 MMOL/L (ref 21–32)
COLOR UR: YELLOW
CREAT SERPL-MCNC: 4.09 MG/DL (ref 0.6–1.3)
CRP SERPL QL: 66.4 MG/L
D DIMER PPP FEU-MCNC: 0.69 UG/ML FEU
EOSINOPHIL # BLD AUTO: 0 THOUSAND/ΜL (ref 0–0.61)
EOSINOPHIL NFR BLD AUTO: 0 % (ref 0–6)
ERYTHROCYTE [DISTWIDTH] IN BLOOD BY AUTOMATED COUNT: 15.4 % (ref 11.6–15.1)
FERRITIN SERPL-MCNC: 656 NG/ML (ref 8–388)
GFR SERPL CREATININE-BSD FRML MDRD: 10 ML/MIN/1.73SQ M
GLUCOSE SERPL-MCNC: 110 MG/DL (ref 65–140)
GLUCOSE UR STRIP-MCNC: NEGATIVE MG/DL
HCT VFR BLD AUTO: 29.9 % (ref 34.8–46.1)
HGB BLD-MCNC: 9.6 G/DL (ref 11.5–15.4)
HGB UR QL STRIP.AUTO: ABNORMAL
IMM GRANULOCYTES # BLD AUTO: 0.04 THOUSAND/UL (ref 0–0.2)
IMM GRANULOCYTES NFR BLD AUTO: 1 % (ref 0–2)
KETONES UR STRIP-MCNC: NEGATIVE MG/DL
LACTATE SERPL-SCNC: 1.1 MMOL/L (ref 0.5–2)
LEUKOCYTE ESTERASE UR QL STRIP: ABNORMAL
LYMPHOCYTES # BLD AUTO: 0.34 THOUSANDS/ΜL (ref 0.6–4.47)
LYMPHOCYTES NFR BLD AUTO: 12 % (ref 14–44)
MCH RBC QN AUTO: 29.4 PG (ref 26.8–34.3)
MCHC RBC AUTO-ENTMCNC: 32.1 G/DL (ref 31.4–37.4)
MCV RBC AUTO: 92 FL (ref 82–98)
MONOCYTES # BLD AUTO: 0.22 THOUSAND/ΜL (ref 0.17–1.22)
MONOCYTES NFR BLD AUTO: 8 % (ref 4–12)
NEUTROPHILS # BLD AUTO: 2.31 THOUSANDS/ΜL (ref 1.85–7.62)
NEUTS SEG NFR BLD AUTO: 79 % (ref 43–75)
NITRITE UR QL STRIP: NEGATIVE
NON-SQ EPI CELLS URNS QL MICRO: ABNORMAL /HPF
NRBC BLD AUTO-RTO: 0 /100 WBCS
P AXIS: 31 DEGREES
PH UR STRIP.AUTO: 8.5 [PH] (ref 4.5–8)
PLATELET # BLD AUTO: 220 THOUSANDS/UL (ref 149–390)
PMV BLD AUTO: 10.3 FL (ref 8.9–12.7)
POTASSIUM SERPL-SCNC: 3.6 MMOL/L (ref 3.5–5.3)
PR INTERVAL: 132 MS
PROCALCITONIN SERPL-MCNC: 0.12 NG/ML
PROT SERPL-MCNC: 7.2 G/DL (ref 6.4–8.2)
PROT UR STRIP-MCNC: >=300 MG/DL
QRS AXIS: -3 DEGREES
QRSD INTERVAL: 88 MS
QT INTERVAL: 410 MS
QTC INTERVAL: 467 MS
RBC # BLD AUTO: 3.26 MILLION/UL (ref 3.81–5.12)
RBC #/AREA URNS AUTO: ABNORMAL /HPF
SARS-COV-2 RNA RESP QL NAA+PROBE: POSITIVE
SODIUM SERPL-SCNC: 135 MMOL/L (ref 136–145)
SP GR UR STRIP.AUTO: 1.02 (ref 1–1.03)
T WAVE AXIS: 34 DEGREES
TRI-PHOS CRY URNS QL MICRO: ABNORMAL /HPF
TROPONIN I SERPL-MCNC: 0.04 NG/ML
UROBILINOGEN UR QL STRIP.AUTO: 0.2 E.U./DL
VENTRICULAR RATE: 78 BPM
WBC # BLD AUTO: 2.92 THOUSAND/UL (ref 4.31–10.16)
WBC #/AREA URNS AUTO: ABNORMAL /HPF

## 2021-07-15 PROCEDURE — 99223 1ST HOSP IP/OBS HIGH 75: CPT | Performed by: INTERNAL MEDICINE

## 2021-07-15 PROCEDURE — 93005 ELECTROCARDIOGRAM TRACING: CPT

## 2021-07-15 PROCEDURE — 86140 C-REACTIVE PROTEIN: CPT | Performed by: INTERNAL MEDICINE

## 2021-07-15 PROCEDURE — 87040 BLOOD CULTURE FOR BACTERIA: CPT | Performed by: PHYSICIAN ASSISTANT

## 2021-07-15 PROCEDURE — 99285 EMERGENCY DEPT VISIT HI MDM: CPT

## 2021-07-15 PROCEDURE — 82728 ASSAY OF FERRITIN: CPT | Performed by: INTERNAL MEDICINE

## 2021-07-15 PROCEDURE — 81001 URINALYSIS AUTO W/SCOPE: CPT

## 2021-07-15 PROCEDURE — 87086 URINE CULTURE/COLONY COUNT: CPT

## 2021-07-15 PROCEDURE — 36415 COLL VENOUS BLD VENIPUNCTURE: CPT | Performed by: PHYSICIAN ASSISTANT

## 2021-07-15 PROCEDURE — 93010 ELECTROCARDIOGRAM REPORT: CPT | Performed by: INTERNAL MEDICINE

## 2021-07-15 PROCEDURE — 84484 ASSAY OF TROPONIN QUANT: CPT | Performed by: PHYSICIAN ASSISTANT

## 2021-07-15 PROCEDURE — 80053 COMPREHEN METABOLIC PANEL: CPT | Performed by: PHYSICIAN ASSISTANT

## 2021-07-15 PROCEDURE — 96360 HYDRATION IV INFUSION INIT: CPT

## 2021-07-15 PROCEDURE — 83605 ASSAY OF LACTIC ACID: CPT | Performed by: PHYSICIAN ASSISTANT

## 2021-07-15 PROCEDURE — 99285 EMERGENCY DEPT VISIT HI MDM: CPT | Performed by: PHYSICIAN ASSISTANT

## 2021-07-15 PROCEDURE — U0005 INFEC AGEN DETEC AMPLI PROBE: HCPCS | Performed by: PHYSICIAN ASSISTANT

## 2021-07-15 PROCEDURE — 99222 1ST HOSP IP/OBS MODERATE 55: CPT | Performed by: INTERNAL MEDICINE

## 2021-07-15 PROCEDURE — 71045 X-RAY EXAM CHEST 1 VIEW: CPT

## 2021-07-15 PROCEDURE — 84145 PROCALCITONIN (PCT): CPT | Performed by: PHYSICIAN ASSISTANT

## 2021-07-15 PROCEDURE — U0003 INFECTIOUS AGENT DETECTION BY NUCLEIC ACID (DNA OR RNA); SEVERE ACUTE RESPIRATORY SYNDROME CORONAVIRUS 2 (SARS-COV-2) (CORONAVIRUS DISEASE [COVID-19]), AMPLIFIED PROBE TECHNIQUE, MAKING USE OF HIGH THROUGHPUT TECHNOLOGIES AS DESCRIBED BY CMS-2020-01-R: HCPCS | Performed by: PHYSICIAN ASSISTANT

## 2021-07-15 PROCEDURE — 85025 COMPLETE CBC W/AUTO DIFF WBC: CPT | Performed by: PHYSICIAN ASSISTANT

## 2021-07-15 PROCEDURE — 87493 C DIFF AMPLIFIED PROBE: CPT | Performed by: INTERNAL MEDICINE

## 2021-07-15 PROCEDURE — 85379 FIBRIN DEGRADATION QUANT: CPT | Performed by: INTERNAL MEDICINE

## 2021-07-15 RX ORDER — MULTIVITAMIN/IRON/FOLIC ACID 18MG-0.4MG
1 TABLET ORAL DAILY
Status: DISCONTINUED | OUTPATIENT
Start: 2021-07-22 | End: 2021-08-03 | Stop reason: HOSPADM

## 2021-07-15 RX ORDER — LANOLIN ALCOHOL/MO/W.PET/CERES
3 CREAM (GRAM) TOPICAL
Status: DISCONTINUED | OUTPATIENT
Start: 2021-07-15 | End: 2021-07-27

## 2021-07-15 RX ORDER — SODIUM CHLORIDE, SODIUM GLUCONATE, SODIUM ACETATE, POTASSIUM CHLORIDE, MAGNESIUM CHLORIDE, SODIUM PHOSPHATE, DIBASIC, AND POTASSIUM PHOSPHATE .53; .5; .37; .037; .03; .012; .00082 G/100ML; G/100ML; G/100ML; G/100ML; G/100ML; G/100ML; G/100ML
100 INJECTION, SOLUTION INTRAVENOUS CONTINUOUS
Status: DISCONTINUED | OUTPATIENT
Start: 2021-07-15 | End: 2021-07-15

## 2021-07-15 RX ORDER — LOPERAMIDE HYDROCHLORIDE 2 MG/1
2 CAPSULE ORAL 4 TIMES DAILY PRN
Status: DISCONTINUED | OUTPATIENT
Start: 2021-07-15 | End: 2021-07-18

## 2021-07-15 RX ORDER — CITALOPRAM 20 MG/1
20 TABLET ORAL DAILY
Status: DISCONTINUED | OUTPATIENT
Start: 2021-07-15 | End: 2021-08-03 | Stop reason: HOSPADM

## 2021-07-15 RX ORDER — ONDANSETRON 2 MG/ML
4 INJECTION INTRAMUSCULAR; INTRAVENOUS EVERY 4 HOURS PRN
Status: DISCONTINUED | OUTPATIENT
Start: 2021-07-15 | End: 2021-08-03 | Stop reason: HOSPADM

## 2021-07-15 RX ORDER — ACETAMINOPHEN 325 MG/1
650 TABLET ORAL EVERY 4 HOURS PRN
Status: DISCONTINUED | OUTPATIENT
Start: 2021-07-15 | End: 2021-08-03 | Stop reason: HOSPADM

## 2021-07-15 RX ORDER — SODIUM BICARBONATE 650 MG/1
650 TABLET ORAL
Status: DISCONTINUED | OUTPATIENT
Start: 2021-07-15 | End: 2021-07-15

## 2021-07-15 RX ORDER — MELATONIN
2000 DAILY
Status: DISCONTINUED | OUTPATIENT
Start: 2021-07-15 | End: 2021-08-03 | Stop reason: HOSPADM

## 2021-07-15 RX ORDER — SACCHAROMYCES BOULARDII 250 MG
250 CAPSULE ORAL DAILY
Status: DISCONTINUED | OUTPATIENT
Start: 2021-07-15 | End: 2021-07-18

## 2021-07-15 RX ORDER — LEVOTHYROXINE SODIUM 0.07 MG/1
75 TABLET ORAL DAILY
Status: DISCONTINUED | OUTPATIENT
Start: 2021-07-15 | End: 2021-08-03 | Stop reason: HOSPADM

## 2021-07-15 RX ORDER — ASCORBIC ACID 500 MG
1000 TABLET ORAL EVERY 12 HOURS SCHEDULED
Status: COMPLETED | OUTPATIENT
Start: 2021-07-15 | End: 2021-07-22

## 2021-07-15 RX ORDER — ATORVASTATIN CALCIUM 40 MG/1
40 TABLET, FILM COATED ORAL
Status: DISCONTINUED | OUTPATIENT
Start: 2021-07-15 | End: 2021-08-03 | Stop reason: HOSPADM

## 2021-07-15 RX ORDER — MELATONIN
1000 DAILY
Status: DISCONTINUED | OUTPATIENT
Start: 2021-07-15 | End: 2021-07-15

## 2021-07-15 RX ORDER — ACETAMINOPHEN 160 MG/5ML
1 SUSPENSION, ORAL (FINAL DOSE FORM) ORAL ONCE
Status: COMPLETED | OUTPATIENT
Start: 2021-07-15 | End: 2021-07-15

## 2021-07-15 RX ORDER — FAMOTIDINE 20 MG/1
20 TABLET, FILM COATED ORAL DAILY
Status: DISCONTINUED | OUTPATIENT
Start: 2021-07-15 | End: 2021-08-03 | Stop reason: HOSPADM

## 2021-07-15 RX ORDER — ZINC SULFATE 50(220)MG
220 CAPSULE ORAL DAILY
Status: COMPLETED | OUTPATIENT
Start: 2021-07-15 | End: 2021-07-21

## 2021-07-15 RX ORDER — CALCITRIOL 0.25 UG/1
0.25 CAPSULE, LIQUID FILLED ORAL DAILY
Status: DISCONTINUED | OUTPATIENT
Start: 2021-07-15 | End: 2021-08-03 | Stop reason: HOSPADM

## 2021-07-15 RX ADMIN — SODIUM BICARBONATE 100 ML/HR: 84 INJECTION, SOLUTION INTRAVENOUS at 19:57

## 2021-07-15 RX ADMIN — Medication 250 MG: at 18:28

## 2021-07-15 RX ADMIN — FAMOTIDINE 20 MG: 20 TABLET ORAL at 16:19

## 2021-07-15 RX ADMIN — ATORVASTATIN CALCIUM 40 MG: 40 TABLET, FILM COATED ORAL at 18:26

## 2021-07-15 RX ADMIN — ZINC SULFATE 220 MG (50 MG) CAPSULE 220 MG: CAPSULE at 16:20

## 2021-07-15 RX ADMIN — MELATONIN TAB 3 MG 3 MG: 3 TAB at 21:23

## 2021-07-15 RX ADMIN — CALCITRIOL CAPSULES 0.25 MCG 0.25 MCG: 0.25 CAPSULE ORAL at 18:28

## 2021-07-15 RX ADMIN — CEFEPIME HYDROCHLORIDE 1000 MG: 1 INJECTION, POWDER, FOR SOLUTION INTRAMUSCULAR; INTRAVENOUS at 17:17

## 2021-07-15 RX ADMIN — Medication 125 MG: at 18:27

## 2021-07-15 RX ADMIN — OXYCODONE HYDROCHLORIDE AND ACETAMINOPHEN 1000 MG: 500 TABLET ORAL at 21:23

## 2021-07-15 RX ADMIN — SODIUM CHLORIDE 500 ML: 0.9 INJECTION, SOLUTION INTRAVENOUS at 12:57

## 2021-07-15 RX ADMIN — CITALOPRAM HYDROBROMIDE 20 MG: 20 TABLET ORAL at 18:27

## 2021-07-15 RX ADMIN — APIXABAN 2.5 MG: 2.5 TABLET, FILM COATED ORAL at 16:20

## 2021-07-15 RX ADMIN — METOPROLOL TARTRATE 25 MG: 25 TABLET, FILM COATED ORAL at 18:26

## 2021-07-15 RX ADMIN — CEFTRIAXONE SODIUM 1000 MG: 10 INJECTION, POWDER, FOR SOLUTION INTRAVENOUS at 21:23

## 2021-07-15 RX ADMIN — LEVOTHYROXINE SODIUM 75 MCG: 75 TABLET ORAL at 16:20

## 2021-07-15 RX ADMIN — Medication 2000 UNITS: at 16:19

## 2021-07-15 NOTE — ED PROVIDER NOTES
History  Chief Complaint   Patient presents with    Weakness - Generalized     Patient coming in for generalized weakness / lethargy; states that she has been having trouble getting around at home     77 y o  female arrival via ems for evaluation of worsening generalized weakness, feeling of fatigued, loss of appetite and feeling unwell  States has had some diarrhea over the last 2 days, has not been eating much, trying to drink, but notes she has not been taking prescribed medications as scheduled  EMS noted she had a temperature of 102 was given tylenol en route  States symptoms have been present for approximately 4 days worsening over the last 2 days  Denies N/V, SOB, cough, congestion, HA, blurry vision, dizziness, syncope, lightheadedness, CP, known sick contacts, known COVID exposures  She has not received the COVID vaccine to date  PMH significant for CKD stage 5 not on dialysis, DVT, Axiety, HTN, bladder mass s/p urostomy, polycythemia vera,           Prior to Admission Medications   Prescriptions Last Dose Informant Patient Reported? Taking? Cholecalciferol (VITAMIN D3) 1000 UNITS CAPS 2021 at Unknown time Self Yes Yes   Sig: Take 1,000 unit marking on U-100 syringe by mouth daily     Eliquis 2 5 MG 2021 at Unknown time Self No Yes   Sig: TAKE 1 TABLET BY MOUTH TWICE A DAY   Patient taking differently: 2 5 mg daily    Jakafi 10 MG tablet 2021 at Unknown time Self No Yes   Sig: TAKE 1 TABLET (10 MG TOTAL) BY MOUTH DAILY   acetaminophen (TYLENOL) 325 mg tablet  Self No Yes   Si mg every 6 hours as needed for mild pain or headache   atorvastatin (LIPITOR) 40 mg tablet 2021 at Unknown time Self No Yes   Sig: Take 1 tablet (40 mg total) by mouth daily   Patient taking differently: Take 40 mg by mouth daily at bedtime    calcitriol (ROCALTROL) 0 25 mcg capsule 2021 at Unknown time Self No Yes   Sig: Take 1 capsule (0 25 mcg total) by mouth daily   citalopram (CeleXA) 20 mg tablet 7/14/2021 at Unknown time Self Yes Yes   Sig: Take 20 mg by mouth daily    docusate sodium (COLACE) 100 mg capsule  Self No No   Sig: Take 1 capsule (100 mg total) by mouth 2 (two) times a day   levothyroxine 75 mcg tablet 7/14/2021 at Unknown time Self Yes Yes   Sig: Take 75 mcg by mouth daily   loperamide (IMODIUM) 2 mg capsule 7/14/2021 at Unknown time Self No Yes   Sig: Take 1 capsule (2 mg total) by mouth 4 (four) times a day as needed for diarrhea   melatonin 3 mg 7/14/2021 at Unknown time Self No Yes   Sig: Take 1 tablet (3 mg total) by mouth daily at bedtime   metoprolol tartrate (LOPRESSOR) 25 mg tablet 7/14/2021 at Unknown time Self No Yes   Sig: Take 1 tablet (25 mg total) by mouth 2 (two) times a day   polyethylene glycol (MIRALAX) 17 g packet More than a month at Unknown time Self No No   Sig: Take 17 g by mouth daily as needed (constipation)   saccharomyces boulardii (FLORASTOR) 250 mg capsule 7/14/2021 at Unknown time Self Yes Yes   Sig: Take 1 capsule by mouth daily     sodium bicarbonate 650 mg tablet 7/14/2021 at Unknown time Self No Yes   Sig: Take 1 tablet (650 mg total) by mouth 2 (two) times daily after meals   sodium chloride, PF, 0 9 % Not Taking at Unknown time Self No No   Sig: 10 mL by Intracatheter route daily Intracatheter flushing daily   Patient not taking: Reported on 7/8/2021      Facility-Administered Medications: None       Past Medical History:   Diagnosis Date    Anxiety     Bowel obstruction (HCC)     Chronic kidney disease (CKD), stage IV (severe) (HCC)     stage IV    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    Circulation problem     Compression fracture of cervical spine (HCC)     Hernia of abdominal cavity     History of kidney problems     Hydronephrosis     Hypertension     IBS (irritable bowel syndrome)     Incontinence     Lung mass     Improving on PET/CT 1/2016    Polycythemia vera ([facility] [address]     Pulmonary embolism ([facility] [address] ) 2014    Shingles     Urinary tract infection        Past Surgical History:   Procedure Laterality Date    ABDOMINAL ADHESION SURGERY N/A 8/9/2020    Procedure: LYSIS ADHESIONS;  Surgeon: Susana Vences DO;  [facility];  Service: General    BLADDER SUSPENSION      BOTOX INJECTION N/A 7/27/2016    Procedure: BOTOX INJECTION ;  Surgeon: Dianna Melgar MD;  [facility];  Service:    [address], RADICAL WITH ILEOCONDUIT N/A 10/4/2016    Procedure: [place] WITH ILEAL CONDUIT ;  Surgeon: Dianna Melgar MD;  [facility];  Service:    [employer] CYSTOSCOPY W/ RETROGRADES Bilateral 7/27/2016    Procedure: Perfecto Mahmood; RETROGRADE PYELOGRAM ;  Surgeon: Dianna Melgar MD;  [facility];  Service:     HERNIA REPAIR      IR AV FISTULAGRAM/GRAFTOGRAM  2/10/2021    IR NEPHROSTOMY TO NEPHROURETERAL STENT  5/15/2021    IR NEPHROSTOMY TO NEPHROURETERAL STENT  6/9/2021    IR NEPHROSTOMY TUBE CHECK AND/OR REMOVAL  6/16/2021    IR NEPHROSTOMY TUBE CHECK/CHANGE/REPOSITION/REINSERTION/UPSIZE  5/14/2021    IR NEPHROSTOMY TUBE CHECK/CHANGE/REPOSITION/REINSERTION/UPSIZE  5/21/2021    IR NEPHROSTOMY TUBE PLACEMENT  5/10/2021    IR NON-TUNNELED CENTRAL LINE PLACEMENT  7/17/2020    IR NON-TUNNELED CENTRAL LINE PLACEMENT  8/14/2020    IR NON-TUNNELED CENTRAL LINE PLACEMENT  7/16/2021    LAPAROTOMY N/A 8/9/2020    Procedure: LAPAROTOMY EXPLORATORY, PARASTOMAL HERNIA REPAIR WITH MESH;  Surgeon: Susana Vences DO;  [facility];  Service: General    OK ANASTOMOSIS,AV,ANY SITE Right 1/5/2021    Procedure: Creation of right brachiobasilic fistula; Surgeon: Delmar Chairez MD;  [facility];  Service: Vascular    OK COLONOSCOPY FLX DX W/COLLJ SPEC WHEN PFRMD N/A 8/31/2016    Procedure: COLONOSCOPY;  Surgeon: Pat Menendez MD;  [facility];   Service: Gastroenterology    OK CYSTOSCOPY,INSERT URETERAL STENT Bilateral 7/27/2016    Procedure: STENT INSERTION; EXCISION OF MESH ;  Surgeon: Kat Meléndez MD;  [facility];  Service: Urology    TONSILLECTOMY      TUBAL LIGATION      WISDOM TOOTH EXTRACTION         Family History   Problem Relation Age of Onset    Cancer Mother         small cell cancer     Heart disease Father     COPD Father     Heart disease Brother     Nephrolithiasis Brother      I have reviewed and agree with the history as documented  E-Cigarette/Vaping    E-Cigarette Use Never User      E-Cigarette/Vaping Substances    Nicotine No     THC No     CBD No     Flavoring No     Other No     Unknown No      Social History     Tobacco Use    Smoking status: Never Smoker    Smokeless tobacco: Never Used    Tobacco comment: n/a   Vaping Use    Vaping Use: Never used   Substance Use Topics    Alcohol use: Not Currently     Comment: n/s    Drug use: Not Currently     Comment: n/a       Review of Systems   Constitutional: Positive for activity change, appetite change, fatigue and fever  Gastrointestinal: Positive for diarrhea  Musculoskeletal: Positive for myalgias  All other systems reviewed and are negative  Physical Exam  Physical Exam  Vitals and nursing note reviewed  Constitutional:       Appearance: Normal appearance  She is normal weight  HENT:      Head: Normocephalic and atraumatic  Right Ear: Tympanic membrane, ear canal and external ear normal       Left Ear: Tympanic membrane, ear canal and external ear normal       Nose: Nose normal       Mouth/Throat:      Mouth: Mucous membranes are moist       Pharynx: Oropharynx is clear  Eyes:      Extraocular Movements: Extraocular movements intact  Conjunctiva/sclera: Conjunctivae normal       Pupils: Pupils are equal, round, and reactive to light  Cardiovascular:      Rate and Rhythm: Normal rate and regular rhythm  Pulses: Normal pulses  Heart sounds: Normal heart sounds     Pulmonary:      Effort: Pulmonary effort is normal       Breath sounds: Decreased air movement present  Examination of the right-middle field reveals decreased breath sounds  Examination of the left-middle field reveals decreased breath sounds  Examination of the right-lower field reveals decreased breath sounds  Examination of the left-lower field reveals decreased breath sounds  Decreased breath sounds present  No wheezing, rhonchi or rales  Abdominal:      General: Bowel sounds are normal       Palpations: Abdomen is soft  Musculoskeletal:         General: Normal range of motion  Cervical back: Normal range of motion and neck supple  Skin:     General: Skin is warm and dry  Capillary Refill: Capillary refill takes less than 2 seconds  Coloration: Skin is pale  Neurological:      General: No focal deficit present  Mental Status: She is alert and oriented to person, place, and time  Mental status is at baseline  Psychiatric:         Mood and Affect: Mood normal          Behavior: Behavior normal          Thought Content:  Thought content normal          Judgment: Judgment normal          Vital Signs  ED Triage Vitals [07/15/21 1100]   Temperature Pulse Respirations Blood Pressure SpO2   99 8 °F (37 7 °C) 82 18 130/68 95 %      Temp Source Heart Rate Source Patient Position - Orthostatic VS BP Location FiO2 (%)   Oral Monitor Lying Left arm --      Pain Score       No Pain           Vitals:    07/17/21 0830 07/17/21 0845 07/17/21 2135 07/18/21 0807   BP:   146/75 118/63   Pulse: 80 100 76 78   Patient Position - Orthostatic VS:   Lying          Visual Acuity      ED Medications  Medications   atorvastatin (LIPITOR) tablet 40 mg (40 mg Oral Given 7/17/21 1744)   calcitriol (ROCALTROL) capsule 0 25 mcg (0 25 mcg Oral Given 7/18/21 0816)   citalopram (CeleXA) tablet 20 mg (20 mg Oral Given 7/18/21 0816)   levothyroxine tablet 75 mcg (75 mcg Oral Given 7/18/21 0500)   melatonin tablet 3 mg (3 mg Oral Given 7/17/21 2131)   metoprolol tartrate (LOPRESSOR) tablet 25 mg (25 mg Oral Given 7/18/21 0816)   saccharomyces boulardii (FLORASTOR) capsule 250 mg (250 mg Oral Given 7/18/21 0816)   loperamide (IMODIUM) capsule 2 mg (2 mg Oral Given 7/16/21 1803)   apixaban (ELIQUIS) tablet 2 5 mg (2 5 mg Oral Given 7/18/21 0816)   ondansetron (ZOFRAN) injection 4 mg (4 mg Intravenous Given 7/17/21 0845)   vancomycin (VANCOCIN) oral solution 125 mg (125 mg Oral Given 7/18/21 0500)   cholecalciferol (VITAMIN D3) tablet 2,000 Units (2,000 Units Oral Given 7/18/21 0816)   ascorbic acid (VITAMIN C) tablet 1,000 mg (1,000 mg Oral Given 7/18/21 0816)   zinc sulfate (ZINCATE) capsule 220 mg (220 mg Oral Given 7/18/21 0816)     Followed by   multivitamin-minerals (CENTRUM ADULTS) tablet 1 tablet (has no administration in time range)   famotidine (PEPCID) tablet 20 mg (20 mg Oral Given 7/18/21 0816)   acetaminophen (TYLENOL) tablet 650 mg (has no administration in time range)   ruxolitinib (JAKAFI) tablet 10 mg (10 mg Oral Given 7/18/21 0822)   remdesivir (Veklury) 200 mg in sodium chloride 0 9 % 250 mL IVPB (0 mg Intravenous Hold 7/16/21 1820)     Followed by   remdesivir Fronie Angel) 100 mg in sodium chloride 0 9 % 250 mL IVPB (100 mg Intravenous New Bag 7/17/21 1406)   multi-electrolyte (PLASMALYTE-A/ISOLYTE-S PH 7 4) IV solution (75 mL/hr Intravenous New Bag 7/17/21 2131)   acetaminophen (FOR EMS ONLY) (TYLENOL) oral suspension 650 mg (0 mg Does not apply Given to EMS 7/15/21 1228)   sodium chloride 0 9 % bolus 500 mL (0 mL Intravenous Stopped 7/15/21 1500)   ceftriaxone (ROCEPHIN) 1 g/50 mL in dextrose IVPB (0 mg Intravenous Stopped 7/15/21 2200)   lidocaine (PF) (XYLOCAINE-MPF) 1 % injection (10 mL Infiltration Given 7/16/21 1914)   potassium chloride 20 mEq IVPB (premix) (20 mEq Intravenous New Bag 7/17/21 0122)       Diagnostic Studies  Results Reviewed     Procedure Component Value Units Date/Time    Blood culture #1 [[id number]] Collected: 07/15/21 1151    Lab Status: Preliminary result Specimen: Blood from Arm, Right Updated: 07/17/21 1702     Blood Culture No Growth at 48 hrs  Blood culture #2 [[id number]] Collected: 07/15/21 1151    Lab Status: Preliminary result Specimen: Blood from Arm, Left Updated: 07/17/21 1702     Blood Culture No Growth at 48 hrs      Clostridium difficile toxin by PCR with EIA [[id number]]  (Normal) Collected: 07/15/21 2201    Lab Status: Final result Specimen: Stool from Per Stoma Updated: 07/16/21 1410      C difficile toxin by PCR  Negative    Narrative:           Urine culture [[id number]] Collected: 07/15/21 1149    Lab Status: Final result Specimen: Urine, Other Updated: 07/16/21 1142     Urine Culture >100,000 cfu/ml     Novel Coronavirus (Covid-19),PCR [facility] [[id number]]  (Abnormal) Collected: 07/15/21 1143    Lab Status: Final result Specimen: Nares from Nose Updated: 07/15/21 1414     SARS-CoV-2 Positive    Narrative:           Troponin I [[id number]]  (Normal) Collected: 07/15/21 1254    Lab Status: Final result Specimen: Blood from Arm, Left Updated: 07/15/21 1328     Troponin I 0 04 ng/mL     Procalcitonin with AM Reflex [[id number]]  (Normal) Collected: 07/15/21 1152    Lab Status: Final result Specimen: Blood from Arm, Left Updated: 07/15/21 1308     Procalcitonin 0 12 ng/ml     Urine Microscopic [[id number]]  (Abnormal) Collected: 07/15/21 1149    Lab Status: Final result Specimen: Urine, Other Updated: 07/15/21 1245     RBC, UA 4-10 /hpf      WBC, UA Innumerable /hpf      Epithelial Cells None Seen /hpf      Bacteria, UA Innumerable /hpf      AMORPH PHOSPATES Innumerable /hpf      Triplep Phos [place], UA Occasional /hpf     Comprehensive metabolic panel [[id number]]  (Abnormal) Collected: 07/15/21 1152    Lab Status: Final result Specimen: Blood from Arm, Left Updated: 07/15/21 1232     Sodium 135 mmol/L      Potassium 3 6 mmol/L      Chloride 110 mmol/L      CO2 13 mmol/L      ANION GAP 12 mmol/L      BUN 44 mg/dL      Creatinine 4 09 mg/dL      Glucose 110 mg/dL      Calcium 8 7 mg/dL      Corrected Calcium 9 5 mg/dL      AST 39 U/L      ALT 13 U/L      Alkaline Phosphatase 56 U/L      Total Protein 7 2 g/dL      Albumin 3 0 g/dL      Total Bilirubin 0 48 mg/dL      eGFR 10 ml/min/1 73sq m     Narrative:      [place] guidelines for Chronic Kidney Disease (CKD):     Stage 1 with normal or high GFR (GFR > 90 mL/min/1 73 square meters)    Stage 2 Mild CKD (GFR = 60-89 mL/min/1 73 square meters)    Stage 3A Moderate CKD (GFR = 45-59 mL/min/1 73 square meters)    Stage 3B Moderate CKD (GFR = 30-44 mL/min/1 73 square meters)    Stage 4 Severe CKD (GFR = 15-29 mL/min/1 73 square meters)    Stage 5 End Stage CKD (GFR <15 mL/min/1 73 square meters)  Note: GFR calculation is accurate only with a steady state creatinine    Lactic acid, plasma [[id number]]  (Normal) Collected: 07/15/21 1152    Lab Status: Final result Specimen: Blood from Arm, Left Updated: 07/15/21 1229     LACTIC ACID 1 1 mmol/L     Narrative:      Result may be elevated if tourniquet was used during collection      CBC and differential [[id number]]  (Abnormal) Collected: 07/15/21 1152    Lab Status: Final result Specimen: Blood from Arm, Left Updated: 07/15/21 1216     WBC 2 92 Thousand/uL      RBC 3 26 Million/uL      Hemoglobin 9 6 g/dL      Hematocrit 29 9 %      MCV 92 fL      MCH 29 4 pg      MCHC 32 1 g/dL      RDW 15 4 %      MPV 10 3 fL      Platelets 039 Thousands/uL      nRBC 0 /100 WBCs      Neutrophils Relative 79 %      Immat GRANS % 1 %      Lymphocytes Relative 12 %      Monocytes Relative 8 %      Eosinophils Relative 0 %      Basophils Relative 0 %      Neutrophils Absolute 2 31 Thousands/µL      Immature Grans Absolute 0 04 Thousand/uL      Lymphocytes Absolute 0 34 Thousands/µL      Monocytes Absolute 0 22 Thousand/µL      Eosinophils Absolute 0 00 Thousand/µL      Basophils Absolute 0 01 Thousands/µL     Urine Macroscopic, POC [[id number]] (Abnormal) Collected: 07/15/21 1149    Lab Status: Final result Specimen: Urine Updated: 07/15/21 1150     Color, UA Yellow     Clarity, UA Cloudy     pH, UA 8 5     Leukocytes, UA Large     Nitrite, UA Negative     Protein, UA >=300 mg/dl      Glucose, UA Negative mg/dl      Ketones, UA Negative mg/dl      Urobilinogen, UA 0 2 E U /dl      Bilirubin, UA Interference- unable to analyze     Blood, UA Large     Specific [place], UA 1 020    Narrative:      CLINITEK RESULT                 IR non-tunneled central line placement   Final Result by Marianne Covington DO (07/16 1935)   Impression: Successful placement of a central venous catheter via the right external jugular vein  Workstation performed: [id number]         XR chest 1 view portable   Final Result by Estrellita Stewart MD (07/16 1388)      Question mild bilateral groundglass opacity in the upper lungs  If this is a real finding, it could be due to Covid-19 pneumonia  Workstation performed: [id number]                    Procedures  Procedures         ED Course               Identification of Seniors at Risk      Most Recent Value   (ISAR) Identification of Seniors at Risk   Before the illness or injury that brought you to the Emergency, did you need someone to help you on a regular basis? 0 Filed at: 07/15/2021 1102   In the last 24 hours, have you needed more help than usual?  0 Filed at: 07/15/2021 1102   Have you been hospitalized for one or more nights during the past 6 months? 1 Filed at: 07/15/2021 1102   In general, do you see well?  0 Filed at: 07/15/2021 1102   In general, do you have serious problems with your memory? 0 Filed at: 07/15/2021 1102   Do you take more than three different medications every day?   1 Filed at: 07/15/2021 1102   ISAR Score  2 Filed at: 07/15/2021 1102                    SBIRT 22yo+      Most Recent Value   SBIRT (23 yo +)   In order to provide better care to our patients, we are screening all of our patients for alcohol and drug use  Would it be okay to ask you these screening questions? No Filed at: 07/15/2021 1103                    MDM  Number of Diagnoses or Management Options  Fever  Generalized weakness  UTI (urinary tract infection)  Diagnosis management comments: Pt  Is A&Ox3, VSS, afebrile s/p tylenol enroute, ill appearing, pale, PERRLA, EOMI, LS Clear but diminished b/l bases to mid-lung d/t effort, + urostomy, stoma C/D, normal color not obvious skin break down, abd soft, Nt/ND, +BS x4, RRR, cap refill is brisk, no LE edema noted  Will check CBC,CMP, troponin, procalcitonin, urine, blood culture x2, CXR, COVID swab, ECG, Troponin  WBC 2 92, troponin 0 04, crt 4 02 up from baseline,  UA concerning for UTI, COVID swab positive  Will discuss admission with [facility]  Pt accepted for admission      Disposition  Final diagnoses:   Generalized weakness   Fever   UTI (urinary tract infection)     Time reflects when diagnosis was documented in both MDM as applicable and the Disposition within this note     Time User Action Codes Description Comment    7/15/2021  2:13 PM Spenser Alvarez Add [R53 1] Generalized weakness     7/15/2021  2:13 PM Spenser Alvarez Add [R50 9] Fever     7/15/2021  2:14 PM Spenser Alvarez Add [N39 0] UTI (urinary tract infection)     7/15/2021  3:25 PM Aramis Leggett Add [N17 9,  N18 5] Acute renal failure superimposed on stage 5 chronic kidney disease, not on chronic dialysis ([facility] [address] )     7/16/2021 12:47 PM Javier Metro Add [U07 1] COVID-19 virus infection       ED Disposition     ED Disposition Condition Date/Time Comment    Admit Stable Thu Jul 15, 2021  2:13 PM Case was discussed with Dr Olegario Meyers and the patient's admission status was agreed to be in patitent to the service of Dr Lexus Armenta          Follow-up Information    None         Current Discharge Medication List      CONTINUE these medications which have NOT CHANGED    Details   acetaminophen (TYLENOL) 325 mg tablet 650 mg every 6 hours as needed for mild pain or headache  Qty: 30 tablet, Refills: 0    Associated Diagnoses: Subdural hematoma, acute ([facility])      atorvastatin (LIPITOR) 40 mg tablet Take 1 tablet (40 mg total) by mouth daily  Qty: 90 tablet, Refills: 6    Associated Diagnoses: Hyperlipidemia, unspecified hyperlipidemia type      calcitriol (ROCALTROL) 0 25 mcg capsule Take 1 capsule (0 25 mcg total) by mouth daily  Qty: 30 capsule, Refills: 5    Associated Diagnoses: Secondary hyperparathyroidism of renal origin ([facility])      Cholecalciferol (VITAMIN D3) 1000 UNITS CAPS Take 1,000 unit marking on U-100 syringe by mouth daily  citalopram (CeleXA) 20 mg tablet Take 20 mg by mouth daily       Eliquis 2 5 MG TAKE 1 TABLET BY MOUTH TWICE A DAY  Qty: 60 tablet, Refills: 5    Associated Diagnoses: Polycythemia vera ([facility] [address] ); Chronic saddle pulmonary embolism without acute cor pulmonale ([facility])      Jakafi 10 MG tablet TAKE 1 TABLET (10 MG TOTAL) BY MOUTH DAILY  Qty: 30 tablet, Refills: 3    Comments: DX Code Needed    Associated Diagnoses: Polycythemia vera ([facility])      levothyroxine 75 mcg tablet Take 75 mcg by mouth daily  Refills: 3      loperamide (IMODIUM) 2 mg capsule Take 1 capsule (2 mg total) by mouth 4 (four) times a day as needed for diarrhea  Qty: 12 capsule, Refills: 0    Associated Diagnoses: Diarrhea      melatonin 3 mg Take 1 tablet (3 mg total) by mouth daily at bedtime  Qty: 30 tablet, Refills: 0    Associated Diagnoses: Toxic metabolic encephalopathy      metoprolol tartrate (LOPRESSOR) 25 mg tablet Take 1 tablet (25 mg total) by mouth 2 (two) times a day  Qty: 60 tablet, Refills: 0    Comments: DX Code Needed      Associated Diagnoses: CKD (chronic kidney disease), stage IV ([facility])      saccharomyces boulardii (FLORASTOR) 250 mg capsule Take 1 capsule by mouth daily        sodium bicarbonate 650 mg tablet Take 1 tablet (650 mg total) by mouth 2 (two) times daily after meals  Qty: 60 tablet, Refills: 5    Associated Diagnoses: Stage 5 chronic kidney disease not on chronic dialysis (HCC)      docusate sodium (COLACE) 100 mg capsule Take 1 capsule (100 mg total) by mouth 2 (two) times a day  Qty: 10 capsule, Refills: 0    Associated Diagnoses: Constipation      polyethylene glycol (MIRALAX) 17 g packet Take 17 g by mouth daily as needed (constipation)  Qty: 10 each, Refills: 0    Associated Diagnoses: Constipation      sodium chloride, PF, 0 9 % 10 mL by Intracatheter route daily Intracatheter flushing daily  Qty: 300 mL, Refills: 6    Associated Diagnoses: Obstructive uropathy           No discharge procedures on file      PDMP Review       Value Time User    PDMP Reviewed  Yes 1/5/2021 12:26 Amita Chairez MD          ED Provider  Electronically Signed by           Gale Mckeon PA-C  07/18/21 7191

## 2021-07-15 NOTE — H&P
[address] [facility] [birth year], 77 y o  female MRN: [id number]  Unit/Bed#: -01 Encounter: [id number]  Primary Care Provider: Grace Sawyer MD   Date and time admitted to hospital: 7/15/2021 10:52 AM    * Febrile illness  Assessment & Plan  Patient presented with generalized weakness, diarrhea decreasing oral intake and difficulty with ambulation  She reported 1 week duration of symptoms   Unclear etiology  Rule out secondary to UTI versus infectious diarrhea  Patient tested positive for COVID  Empiric IV antibiotic treatment and IV fluid  Follow on chest x-ray  Mild COVID pathway  Follow on urine and blood culture    COVID-19 virus infection  Assessment & Plan  Patient is not hypoxic  Continue with COVID mild pathway    History of Clostridioides difficile colitis  Assessment & Plan  Start oral vancomycin empirically  Follow on stool study    Acute renal failure superimposed on stage 5 chronic kidney disease, not on chronic dialysis [facility])  Assessment & Plan  Lab Results   Component Value Date    EGFR 10 07/15/2021    EGFR 11 07/01/2021    EGFR 13 05/21/2021    CREATININE 4 09 (H) 07/15/2021    CREATININE 3 86 (H) 07/01/2021    CREATININE 3 40 (H) 05/21/2021     Cr baseline 3 4-3 5, follows with Dr Sandy Ford  IV fluid for hydration  Consult nephrology for further management    Polycythemia vera [facility])  Assessment & Plan  Patient follows up with outpatient Hematology Oncology, currently on Jakafi 10 mg Oral Daily    Obstructive uropathy  Assessment & Plan  S/p ileostomy for nonfunctioning bladder and chronic hydro  · OP urology follow up    Chronic saddle pulmonary embolism (HCC)  Assessment & Plan  Continue Eliquis    VTE Pharmacologic Prophylaxis: VTE Score: 8 High Risk (Score >/= 5) - Pharmacological DVT Prophylaxis Ordered: apixaban (Eliquis)  Sequential Compression Devices Ordered    Code Status: Level 1 - Full Code full code      Anticipated Length of Stay: Patient will be admitted on an inpatient basis with an anticipated length of stay of greater than 2 midnights secondary to Above  Total Time for Visit, including Counseling / Coordination of Care: 60 minutes Greater than 50% of this total time spent on direct patient counseling and coordination of care  Chief Complaint:   Weakness    History of Present Illness:  Yoly Vásquez is a 77 y o  female presented today to emergency room complaining of generalized weakness, decreasing oral intake, difficulty with ambulation, and diarrhea for the past 1 week   Underlying history of chronic kidney disease stage 5, PE, history of C diff, obstructive uropathy status post ileostomy, and polycythemia vera    Patient was evaluated in the emergency room,, found to have abnormal urinalysis with leukopenia and  positive for COVID  She did not receive COVID vaccine    Review of Systems:  Review of Systems   Constitutional: Positive for appetite change, chills, fatigue and fever  HENT: Negative for sore throat  Respiratory: Positive for cough (Very minimal dry cough)  Negative for chest tightness and shortness of breath  Cardiovascular: Negative for chest pain, palpitations and leg swelling  Gastrointestinal: Positive for diarrhea  Negative for abdominal pain, blood in stool, nausea and vomiting  Neurological: Positive for weakness  Negative for dizziness, speech difficulty and headaches  All other systems reviewed and are negative        Past Medical and Surgical History:   Past Medical History:   Diagnosis Date    Anxiety     Bowel obstruction (HCC)     Chronic kidney disease (CKD), stage IV (severe) (HCC)     stage IV    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    Circulation problem     Compression fracture of cervical spine (HCC)     Hernia of abdominal cavity     History of kidney problems     Hydronephrosis     Hypertension     IBS (irritable bowel syndrome)     Incontinence     Lung mass     Improving on PET/CT 1/2016    Polycythemia vera ([facility] [place] [address] )     Pulmonary embolism ([facility] [place] [address] ) 2014    Shingles     Urinary tract infection        Past Surgical History:   Procedure Laterality Date    ABDOMINAL ADHESION SURGERY N/A 8/9/2020    Procedure: LYSIS ADHESIONS;  Surgeon: Cj Wheeler DO;  [facility];  Service: General    BLADDER SUSPENSION      BOTOX INJECTION N/A 7/27/2016    Procedure: BOTOX INJECTION ;  Surgeon: Herminia Pandey MD;  [facility];  Service:     CHOLECYSTECTOMY N/A     COLONOSCOPY      CYSTECTOMY, RADICAL WITH ILEOCONDUIT N/A 10/4/2016    Procedure: SUPRATRIGONAL CYSTECTOMY WITH ILEAL CONDUIT ;  Surgeon: Herminia Pandey MD;  [facility];  Service:    Melissa Eaves W/ RETROGRADES Bilateral 7/27/2016    Procedure: CYSTOSCOPY; RETROGRADE PYELOGRAM ;  Surgeon: Herminia Pandey MD;  [facility];  Service:     HERNIA REPAIR      IR AV FISTULAGRAM/GRAFTOGRAM  2/10/2021    IR NEPHROSTOMY TO NEPHROURETERAL STENT  5/15/2021    IR NEPHROSTOMY TO NEPHROURETERAL STENT  6/9/2021    IR NEPHROSTOMY TUBE CHECK AND/OR REMOVAL  6/16/2021    IR NEPHROSTOMY TUBE CHECK/CHANGE/REPOSITION/REINSERTION/UPSIZE  5/14/2021    IR NEPHROSTOMY TUBE CHECK/CHANGE/REPOSITION/REINSERTION/UPSIZE  5/21/2021    IR NEPHROSTOMY TUBE PLACEMENT  5/10/2021    IR NON-TUNNELED CENTRAL LINE PLACEMENT  7/17/2020    IR NON-TUNNELED CENTRAL LINE PLACEMENT  8/14/2020    LAPAROTOMY N/A 8/9/2020    Procedure: LAPAROTOMY EXPLORATORY, PARASTOMAL HERNIA REPAIR WITH MESH;  Surgeon: Cj Wheeler DO;  [facility];  Service: General    RI ANASTOMOSIS,AV,ANY SITE Right 1/5/2021    Procedure: Creation of right brachiobasilic fistula; Surgeon: Robbie Chairez MD;  [facility];  Service: Vascular    RI COLONOSCOPY FLX DX W/COLLJ SPEC WHEN PFRMD N/A 8/31/2016    Procedure: COLONOSCOPY;  Surgeon: Miles Wilcox MD;  Location: [facility];   Service: Gastroenterology   [facility] HI CYSTOSCOPY,INSERT URETERAL STENT Bilateral 7/27/2016    Procedure: STENT INSERTION; EXCISION OF MESH ;  Surgeon: Odilia Benitez MD;  [facility];  Service: Urology    TONSILLECTOMY      TUBAL LIGATION      WISDOM TOOTH EXTRACTION         Meds/Allergies:  Prior to Admission medications    Medication Sig Start Date End Date Taking? Authorizing Provider   acetaminophen (TYLENOL) 325 mg tablet 650 mg every 6 hours as needed for mild pain or headache 1/14/19  Yes Hannah Christine PA-C   atorvastatin (LIPITOR) 40 mg tablet Take 1 tablet (40 mg total) by mouth daily  Patient taking differently: Take 40 mg by mouth daily at bedtime  12/11/20  Yes Zoila Mcgowan MD   calcitriol (ROCALTROL) 0 25 mcg capsule Take 1 capsule (0 25 mcg total) by mouth daily 7/7/21  Yes Sahra Adamson PA-C   Cholecalciferol (VITAMIN D3) 1000 UNITS CAPS Take 1,000 unit marking on U-100 syringe by mouth daily     Yes Historical Provider, MD   citalopram (CeleXA) 20 mg tablet Take 20 mg by mouth daily  5/30/18  Yes Historical Provider, MD   Eliquis 2 5 MG TAKE 1 TABLET BY MOUTH TWICE A DAY  Patient taking differently: 2 5 mg daily  3/29/21  Yes Syl Chin MD   Jakafi 10 MG tablet TAKE 1 TABLET (10 MG TOTAL) BY MOUTH DAILY 6/18/21  Yes Syl Chin MD   levothyroxine 75 mcg tablet Take 75 mcg by mouth daily 2/11/18  Yes Historical Provider, MD   loperamide (IMODIUM) 2 mg capsule Take 1 capsule (2 mg total) by mouth 4 (four) times a day as needed for diarrhea 7/14/20  Yes Marcelino Moreno MD   melatonin 3 mg Take 1 tablet (3 mg total) by mouth daily at bedtime 7/19/20  Yes Ozzie Saba DO   metoprolol tartrate (LOPRESSOR) 25 mg tablet Take 1 tablet (25 mg total) by mouth 2 (two) times a day 7/19/20  Yes Ozzie Saba DO   polyethylene glycol (MIRALAX) 17 g packet Take 17 g by mouth daily as needed (constipation) 5/16/21  Yes Jesus Dhillon MD   saccharomyces boulardii (FLORASTOR) 250 mg capsule Take 1 capsule by mouth daily Yes Historical Provider, MD   sodium bicarbonate 650 mg tablet Take 1 tablet (650 mg total) by mouth 2 (two) times daily after meals 7/7/21  Yes Parris Canela PA-C   docusate sodium (COLACE) 100 mg capsule Take 1 capsule (100 mg total) by mouth 2 (two) times a day  Patient not taking: Reported on 7/8/2021 5/16/21   Dale Burger MD   sodium chloride, PF, 0 9 % 10 mL by Intracatheter route daily Intracatheter flushing daily  Patient not taking: Reported on 7/8/2021 6/9/21   Carlos Simons DO   sodium chloride, PF, 0 9 % 10 mL by Intracatheter route daily Intracatheter flushing daily  Patient not taking: Reported on 7/8/2021 5/20/21 7/15/21  VICTORINA Batres     I have reviewed home medications with a medical source (PCP, Pharmacy, other)  Allergies: Allergies   Allergen Reactions    Chlorhexidine Rash     petichi like rash when using chlorhexidine swabs prior to IV  Social History:  Marital Status:      Substance Use History:   Social History     Substance and Sexual Activity   Alcohol Use Not Currently    Comment: n/s     Social History     Tobacco Use   Smoking Status Never Smoker   Smokeless Tobacco Never Used   Tobacco Comment    n/a     Social History     Substance and Sexual Activity   Drug Use Not Currently    Comment: n/a       Family History:    Family History   Problem Relation Age of Onset    Cancer Mother         small cell cancer     Heart disease Father     COPD Father     Heart disease Brother     Nephrolithiasis Brother      Physical Exam:     Vitals:   Blood Pressure: 146/68 (07/15/21 1400)  Pulse: 72 (07/15/21 1400)  Temperature: 99 8 °F (37 7 °C) (07/15/21 1100)  Temp Source: Oral (07/15/21 1100)  Respirations: 20 (07/15/21 1400)  Height: 5' (152 4 cm) (07/15/21 1100)  Weight - Scale: 88 5 kg (195 lb) (07/15/21 1100)  SpO2: 96 % (07/15/21 1400)    Physical Exam  Vitals reviewed  Constitutional:       Appearance: She is ill-appearing     HENT:      Head: Normocephalic and atraumatic  Mouth/Throat:      Mouth: Mucous membranes are dry  Pharynx: No oropharyngeal exudate  Eyes:      General: No scleral icterus  Extraocular Movements: Extraocular movements intact  Conjunctiva/sclera: Conjunctivae normal       Pupils: Pupils are equal, round, and reactive to light  Cardiovascular:      Rate and Rhythm: Normal rate and regular rhythm  Pulses: Normal pulses  Heart sounds: Normal heart sounds  No murmur heard  Pulmonary:      Effort: Pulmonary effort is normal  No respiratory distress  Breath sounds: Normal breath sounds  No wheezing  Abdominal:      General: Bowel sounds are normal  There is no distension  Palpations: Abdomen is soft  Tenderness: There is no abdominal tenderness  Genitourinary:     Comments: Ileostomy bag in place  Musculoskeletal:         General: Normal range of motion  Cervical back: Normal range of motion and neck supple  Right lower leg: No edema  Left lower leg: No edema  Skin:     General: Skin is warm and dry  Findings: No rash  Neurological:      General: No focal deficit present  Mental Status: She is alert and oriented to person, place, and time  Cranial Nerves: No cranial nerve deficit           Additional Data:     Lab Results:  Results from last 7 days   Lab Units 07/15/21  1152   WBC Thousand/uL 2 92*   HEMOGLOBIN g/dL 9 6*   HEMATOCRIT % 29 9*   PLATELETS Thousands/uL 220   NEUTROS PCT % 79*   LYMPHS PCT % 12*   MONOS PCT % 8   EOS PCT % 0     Results from last 7 days   Lab Units 07/15/21  1152   SODIUM mmol/L 135*   POTASSIUM mmol/L 3 6   CHLORIDE mmol/L 110*   CO2 mmol/L 13*   BUN mg/dL 44*   CREATININE mg/dL 4 09*   ANION GAP mmol/L 12   CALCIUM mg/dL 8 7   ALBUMIN g/dL 3 0*   TOTAL BILIRUBIN mg/dL 0 48   ALK PHOS U/L 56   ALT U/L 13   AST U/L 39   GLUCOSE RANDOM mg/dL 110                 Results from last 7 days   Lab Units 07/15/21  1152   LACTIC ACID mmol/L 1 1   PROCALCITONIN ng/ml 0 12       Imaging: Following chest x-ray resolved  XR chest 1 view portable    (Results Pending)       EKG and Other Studies Reviewed on Admission:   · Reviewed    ** Please Note: This note has been constructed using a voice recognition system   **

## 2021-07-15 NOTE — TELEPHONE ENCOUNTER
LMOM requesting a CB to schedule Superficialization and transposition of R brachiobasilic fistula with Dr Patel Urbano at [facility] 8/2/21

## 2021-07-15 NOTE — ASSESSMENT & PLAN NOTE
Lab Results   Component Value Date    EGFR 10 07/15/2021    EGFR 11 07/01/2021    EGFR 13 05/21/2021    CREATININE 4 09 (H) 07/15/2021    CREATININE 3 86 (H) 07/01/2021    CREATININE 3 40 (H) 05/21/2021     Cr baseline 3 4-3 5, follows with Dr Eliane Snider  IV fluid for hydration  Consult nephrology for further management

## 2021-07-15 NOTE — ED NOTES
Patient is resting comfortably  Denies pain  Repositioned, provided water, and additional blankets  Updated patient regarding plan of care  Will continue to monitor       Shruthi Hudson RN  07/15/21 [id number]

## 2021-07-15 NOTE — ASSESSMENT & PLAN NOTE
Patient presented with generalized weakness, diarrhea decreasing oral intake and difficulty with ambulation  She reported 1 week duration of symptoms   Unclear etiology  Rule out secondary to UTI versus infectious diarrhea  Patient tested positive for COVID  Empiric IV antibiotic treatment and IV fluid  Follow on chest x-ray  Mild COVID pathway  Follow on urine and blood culture

## 2021-07-15 NOTE — PLAN OF CARE
Problem: Potential for Falls  Goal: Patient will remain free of falls  Description: INTERVENTIONS:  - Educate patient/family on patient safety including physical limitations  - Instruct patient to call for assistance with activity   - Consult OT/PT to assist with strengthening/mobility   - Keep Call bell within reach  - Keep bed low and locked with side rails adjusted as appropriate  - Keep care items and personal belongings within reach  - Initiate and maintain comfort rounds  - Apply yellow socks and bracelet for high fall risk patients  - Consider moving patient to room near nurses station  Outcome: Progressing     Problem: PAIN - ADULT  Goal: Verbalizes/displays adequate comfort level or baseline comfort level  Description: Interventions:  - Encourage patient to monitor pain and request assistance  - Assess pain using appropriate pain scale  - Administer analgesics based on type and severity of pain and evaluate response  - Implement non-pharmacological measures as appropriate and evaluate response  - Consider cultural and social influences on pain and pain management  - Notify physician/advanced practitioner if interventions unsuccessful or patient reports new pain  Outcome: Progressing     Problem: INFECTION - ADULT  Goal: Absence or prevention of progression during hospitalization  Description: INTERVENTIONS:  - Assess and monitor for signs and symptoms of infection  - Monitor lab/diagnostic results  - Monitor all insertion sites, i e  indwelling lines, tubes, and drains  - Monitor endotracheal if appropriate and nasal secretions for changes in amount and color  - [place] appropriate cooling/warming therapies per order  - Administer medications as ordered  - Instruct and encourage patient and family to use good hand hygiene technique  - Identify and instruct in appropriate isolation precautions for identified infection/condition  Outcome: Progressing  Goal: Absence of fever/infection during neutropenic period  Description: INTERVENTIONS:  - Monitor WBC    Outcome: Progressing     Problem: SAFETY ADULT  Goal: Patient will remain free of falls  Description: INTERVENTIONS:  - Educate patient/family on patient safety including physical limitations  - Instruct patient to call for assistance with activity   - Consult OT/PT to assist with strengthening/mobility   - Keep Call bell within reach  - Keep bed low and locked with side rails adjusted as appropriate  - Keep care items and personal belongings within reach  - Initiate and maintain comfort rounds  - Apply yellow socks and bracelet for high fall risk patients  - Consider moving patient to room near nurses station  Outcome: Progressing  Goal: Maintain or return to baseline ADL function  Description: INTERVENTIONS:  -  Assess patient's ability to carry out ADLs; assess patient's baseline for ADL function and identify physical deficits which impact ability to perform ADLs (bathing, care of mouth/teeth, toileting, grooming, dressing, etc )  - Assess/evaluate cause of self-care deficits   - Assess range of motion  - Assess patient's mobility; develop plan if impaired  - Assess patient's need for assistive devices and provide as appropriate  - Encourage maximum independence but intervene and supervise when necessary  - Involve family in performance of ADLs  - Assess for home care needs following discharge   - Consider OT consult to assist with ADL evaluation and planning for discharge  - Provide patient education as appropriate  Outcome: Progressing  Goal: Maintains/Returns to pre admission functional level  Description: INTERVENTIONS:  - Perform BMAT or MOVE assessment daily    - Set and communicate daily mobility goal to care team and patient/family/caregiver     - Collaborate with rehabilitation services on mobility goals if consulted  - Record patient progress and toleration of activity level   Outcome: Progressing     Problem: DISCHARGE PLANNING  Goal: Discharge to home or other facility with appropriate resources  Description: INTERVENTIONS:  - Identify barriers to discharge w/patient and caregiver  - Arrange for needed discharge resources and transportation as appropriate  - Identify discharge learning needs (meds, wound care, etc )  - Arrange for interpretive services to assist at discharge as needed  - Refer to Case Management Department for coordinating discharge planning if the patient needs post-hospital services based on physician/advanced practitioner order or complex needs related to functional status, cognitive ability, or social support system  Outcome: Progressing     Problem: Knowledge Deficit  Goal: Patient/family/caregiver demonstrates understanding of disease process, treatment plan, medications, and discharge instructions  Description: Complete learning assessment and assess knowledge base  Interventions:  - Provide teaching at level of understanding  - Provide teaching via preferred learning methods  Outcome: Progressing     Problem: Nutrition/Hydration-ADULT  Goal: Nutrient/Hydration intake appropriate for improving, restoring or maintaining nutritional needs  Description: Monitor and assess patient's nutrition/hydration status for malnutrition  Collaborate with interdisciplinary team and initiate plan and interventions as ordered  Monitor patient's weight and dietary intake as ordered or per policy  Utilize nutrition screening tool and intervene as necessary  Determine patient's food preferences and provide high-protein, high-caloric foods as appropriate       INTERVENTIONS:  - Monitor oral intake, urinary output, labs, and treatment plans  - Assess nutrition and hydration status and recommend course of action  - Evaluate amount of meals eaten  - Assist patient with eating if necessary   - Allow adequate time for meals  - Recommend/ encourage appropriate diets, oral nutritional supplements, and vitamin/mineral supplements  - Order, calculate, and assess calorie counts as needed  - Recommend, monitor, and adjust tube feedings and TPN/PPN based on assessed needs  - Assess need for intravenous fluids  - Provide specific nutrition/hydration education as appropriate  - Include patient/family/caregiver in decisions related to nutrition  Outcome: Progressing

## 2021-07-15 NOTE — CONSULTS
Consultation - Nephrology   Monica Conklin 77 y o  female MRN: [id number]  Unit/Bed#: -01 Encounter: [id number]      Assessment/Plan:  1  Acute on chronic kidney disease (POA), suspecting likely prerenal azotemia given poor intake and diarrhea  +/- component of COVID-19  2  Chronic kidney disease stage 5  Baseline creatinine mid threes most recent outpatient creatinine 3 86 estimated GFR 11  3  Severe metabolic acidosis, predominantly non gap  Start sodium bicarbonate infusion  4  Solitary kidney function with advanced left renal atrophy  5  Left-sided hydronephrosis with recent history of left-sided nephrostomy tube  6  COVID-19 infection, as per primary service  7  Diarrhea with history of C diff colitis, currently on empiric oral vancomycin  8  History of pulmonary embolism currently Eliquis  9  Polycythemia vera, following with Hematology Oncology, currently Jakafi  10  Anemia of chronic disease, continue monitor closely    Plan:  · Empiric antibiotic treatment as per primary service  · Empiric vancomycin given history C diff colitis  · Given metabolic acidosis, start sodium bicarbonate infusion  · Consider renal imaging given history of hydronephrosis  History of Present Illness   Physician Requesting Consult: Dena Harris DO  Reason for Consult / Principal Problem:  Acute kidney injury  HPI: Monica Conklin is a 77y o  year old female who presents with weakness, persistent diarrhea  29-year-old female presents to the emergency room department with complaints of generalized weakness, poor oral intake, watery diarrhea, dizziness lightheadedness and difficulty with ambulation  History of CKD stage 5 baseline creatinine mid threes  Presentation creatinine 4 09   CO2 of 13  History obtained from chart review and the patient    Review of Systems   Constitutional: Positive for activity change, appetite change, fatigue and fever     Respiratory: Negative for chest tightness and shortness of breath  Cardiovascular: Negative for chest pain and leg swelling  Gastrointestinal: Positive for diarrhea and nausea  Negative for abdominal pain and vomiting  Genitourinary: Negative for difficulty urinating  Neurological: Positive for dizziness and light-headedness  Negative for syncope         Pertinent findings of a 10 point review of systems noted above otherwise all others negative    Historical Information   Patient Active Problem List   Diagnosis    Chronic saddle pulmonary embolism (HCC)    Bladder mass    Small bowel obstruction (HCC)    Obstructive uropathy    Secondary hyperparathyroidism of renal origin ([facility] )    Hypertension    Polycythemia vera ([facility]    Intraventricular hemorrhage (HCC)    Anemia in CKD (chronic kidney disease)    Mass of urethra    Acute renal failure superimposed on stage 5 chronic kidney disease, not on chronic dialysis ([facility] )    Thoracic compression fracture (HCC)    Benign neoplasm of supratentorial region of brain ([facility] )    Meningioma ([facility] )    Inverted T wave    Polycythemia    History of Clostridioides difficile colitis    History of shingles    Depression    Adult BMI 39 0-39 9 kg/sq m    Chronic thrombosis of subclavian vein (HCC)    Hyperlipemia    Gross hematuria    Hydronephrosis of left kidney    Anemia    Constipation    Obstructive left pyelonephritis    Right upper quadrant cyst    Obesity, morbid ([facility] )    Febrile illness    COVID-19 virus infection     Past Medical History:   Diagnosis Date    Anxiety     Bowel obstruction (HCC)     Chronic kidney disease (CKD), stage IV (severe) (HCC)     stage IV    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    Circulation problem     Compression fracture of cervical spine (HCC)     Hernia of abdominal cavity     History of kidney problems     Hydronephrosis     Hypertension     IBS (irritable bowel syndrome)     Incontinence     Lung mass     Improving on PET/CT 1/2016  [facility]cythemia vera ([facility] [facility] )     Pulmonary embolism ([facility] ) 2014    Shingles     Urinary tract infection      Past Surgical History:   Procedure Laterality Date    ABDOMINAL ADHESION SURGERY N/A 8/9/2020    Procedure: LYSIS ADHESIONS;  Surgeon: Jes High DO;  [facility];  Service: General    BLADDER SUSPENSION      BOTOX INJECTION N/A 7/27/2016    Procedure: BOTOX INJECTION ;  Surgeon: Rahul Cullen MD;  [facility];  Service:    [address], RADICAL WITH ILEOCONDUIT N/A 10/4/2016    Procedure: Maria Isabel Houston WITH ILEAL CONDUIT ;  Surgeon: Rahul Cullen MD;  [facility];  Service:    Toña Coaster W/ RETROGRADES Bilateral 7/27/2016    Procedure: Deepak Bob; RETROGRADE PYELOGRAM ;  Surgeon: Rahul Cullen MD;  [facility];  Service:     HERNIA REPAIR      IR AV FISTULAGRAM/GRAFTOGRAM  2/10/2021    IR NEPHROSTOMY TO NEPHROURETERAL STENT  5/15/2021    IR NEPHROSTOMY TO NEPHROURETERAL STENT  6/9/2021    IR NEPHROSTOMY TUBE CHECK AND/OR REMOVAL  6/16/2021    IR NEPHROSTOMY TUBE CHECK/CHANGE/REPOSITION/REINSERTION/UPSIZE  5/14/2021    IR NEPHROSTOMY TUBE CHECK/CHANGE/REPOSITION/REINSERTION/UPSIZE  5/21/2021    IR NEPHROSTOMY TUBE PLACEMENT  5/10/2021    IR NON-TUNNELED CENTRAL LINE PLACEMENT  7/17/2020    IR NON-TUNNELED CENTRAL LINE PLACEMENT  8/14/2020    LAPAROTOMY N/A 8/9/2020    Procedure: LAPAROTOMY EXPLORATORY, PARASTOMAL HERNIA REPAIR WITH MESH;  Surgeon: Jes High DO;  [facility];  Service: General    MI ANASTOMOSIS,AV,ANY SITE Right 1/5/2021    Procedure: Creation of right brachiobasilic fistula; Surgeon: Dhruv Chairez MD;  [facility];  Service: Vascular    MI COLONOSCOPY FLX DX W/COLLJ SPEC WHEN PFRMD N/A 8/31/2016    Procedure: COLONOSCOPY;  Surgeon: Mercedez Angulo MD;  [facility];   Service: Gastroenterology    MI CYSTOSCOPY,INSERT URETERAL STENT Bilateral 7/27/2016    Procedure: STENT INSERTION; EXCISION OF MESH ;  Surgeon: Jojo Mcintyre MD;  [facility];  Service: Urology    TONSILLECTOMY      TUBAL LIGATION      WISDOM TOOTH EXTRACTION       Social History   Social History     Substance and Sexual Activity   Alcohol Use Not Currently    Comment: n/s     Social History     Substance and Sexual Activity   Drug Use Not Currently    Comment: n/a     Social History     Tobacco Use   Smoking Status Never Smoker   Smokeless Tobacco Never Used   Tobacco Comment    n/a     Family History   Problem Relation Age of Onset    Cancer Mother         small cell cancer     Heart disease Father     COPD Father     Heart disease Brother     Nephrolithiasis Brother        Meds/Allergies   current meds:   Current Facility-Administered Medications   Medication Dose Route Frequency    acetaminophen (TYLENOL) tablet 650 mg  650 mg Oral Q4H PRN    apixaban (ELIQUIS) tablet 2 5 mg  2 5 mg Oral Daily    ascorbic acid (VITAMIN C) tablet 1,000 mg  1,000 mg Oral Q12H [address]    atorvastatin (LIPITOR) tablet 40 mg  40 mg Oral Daily With Dinner    calcitriol (ROCALTROL) capsule 0 25 mcg  0 25 mcg Oral Daily    cefepime (MAXIPIME) 1,000 mg in dextrose 5 % 50 mL IVPB  1,000 mg Intravenous Q12H    ceftriaxone (ROCEPHIN) 1 g/50 mL in dextrose IVPB  1,000 mg Intravenous Once    cholecalciferol (VITAMIN D3) tablet 2,000 Units  2,000 Units Oral Daily    citalopram (CeleXA) tablet 20 mg  20 mg Oral Daily    famotidine (PEPCID) tablet 20 mg  20 mg Oral Daily    levothyroxine tablet 75 mcg  75 mcg Oral Daily    loperamide (IMODIUM) capsule 2 mg  2 mg Oral 4x Daily PRN    melatonin tablet 3 mg  3 mg Oral HS    metoprolol tartrate (LOPRESSOR) tablet 25 mg  25 mg Oral BID    zinc sulfate (ZINCATE) capsule 220 mg  220 mg Oral Daily    Followed by   [place] ON 7/22/2021] multivitamin-minerals (CENTRUM ADULTS) tablet 1 tablet  1 tablet Oral Daily    ondansetron (ZOFRAN) injection 4 mg  4 mg Intravenous Q4H PRN    ruxolitinib (JAKAFI) tablet 10 mg  10 mg Oral Daily    saccharomyces boulardii (FLORASTOR) capsule 250 mg  250 mg Oral Daily    sodium bicarbonate 150 mEq in dextrose 5 % 1,000 mL infusion  100 mL/hr Intravenous Continuous    vancomycin (VANCOCIN) oral solution 125 mg  125 mg Oral Q6H [facility]    and PTA meds:   Prior to Admission Medications   Prescriptions Last Dose Informant Patient Reported? Taking? Cholecalciferol (VITAMIN D3) 1000 UNITS CAPS 2021 at Unknown time Self Yes Yes   Sig: Take 1,000 unit marking on U-100 syringe by mouth daily     Eliquis 2 5 MG 2021 at Unknown time Self No Yes   Sig: TAKE 1 TABLET BY MOUTH TWICE A DAY   Patient taking differently: 2 5 mg daily    Jakafi 10 MG tablet 2021 at Unknown time Self No Yes   Sig: TAKE 1 TABLET (10 MG TOTAL) BY MOUTH DAILY   acetaminophen (TYLENOL) 325 mg tablet  Self No Yes   Si mg every 6 hours as needed for mild pain or headache   atorvastatin (LIPITOR) 40 mg tablet 2021 at Unknown time Self No Yes   Sig: Take 1 tablet (40 mg total) by mouth daily   Patient taking differently: Take 40 mg by mouth daily at bedtime    calcitriol (ROCALTROL) 0 25 mcg capsule 2021 at Unknown time Self No Yes   Sig: Take 1 capsule (0 25 mcg total) by mouth daily   citalopram (CeleXA) 20 mg tablet 2021 at Unknown time Self Yes Yes   Sig: Take 20 mg by mouth daily    docusate sodium (COLACE) 100 mg capsule  Self No No   Sig: Take 1 capsule (100 mg total) by mouth 2 (two) times a day   levothyroxine 75 mcg tablet 2021 at Unknown time Self Yes Yes   Sig: Take 75 mcg by mouth daily   loperamide (IMODIUM) 2 mg capsule 2021 at Unknown time Self No Yes   Sig: Take 1 capsule (2 mg total) by mouth 4 (four) times a day as needed for diarrhea   melatonin 3 mg 2021 at Unknown time Self No Yes   Sig: Take 1 tablet (3 mg total) by mouth daily at bedtime   metoprolol tartrate (LOPRESSOR) 25 mg Status: She is alert and oriented to person, place, and time             Lab Results:    Results from last 7 days   Lab Units 07/15/21  1152   WBC Thousand/uL 2 92*   HEMOGLOBIN g/dL 9 6*   HEMATOCRIT % 29 9*   PLATELETS Thousands/uL 220     Results from last 7 days   Lab Units 07/15/21  1152   POTASSIUM mmol/L 3 6   CHLORIDE mmol/L 110*   CO2 mmol/L 13*   BUN mg/dL 44*   CREATININE mg/dL 4 09*   CALCIUM mg/dL 8 7

## 2021-07-16 ENCOUNTER — APPOINTMENT (INPATIENT)
Dept: RADIOLOGY | Facility: HOSPITAL | Age: 76
DRG: 177 | End: 2021-07-16
Payer: COMMERCIAL

## 2021-07-16 PROBLEM — E87.20 METABOLIC ACIDOSIS: Status: ACTIVE | Noted: 2021-07-16

## 2021-07-16 PROBLEM — E87.2 METABOLIC ACIDOSIS: Status: ACTIVE | Noted: 2021-07-16

## 2021-07-16 PROBLEM — E66.01 CLASS 2 SEVERE OBESITY DUE TO EXCESS CALORIES WITH SERIOUS COMORBIDITY AND BODY MASS INDEX (BMI) OF 35.0 TO 35.9 IN ADULT (HCC): Status: ACTIVE | Noted: 2021-01-05

## 2021-07-16 LAB
ALBUMIN SERPL BCP-MCNC: 2.6 G/DL (ref 3.5–5)
ALP SERPL-CCNC: 48 U/L (ref 46–116)
ALT SERPL W P-5'-P-CCNC: 12 U/L (ref 12–78)
ANION GAP SERPL CALCULATED.3IONS-SCNC: 12 MMOL/L (ref 4–13)
AST SERPL W P-5'-P-CCNC: 50 U/L (ref 5–45)
BACTERIA UR CULT: NORMAL
BILIRUB SERPL-MCNC: 0.46 MG/DL (ref 0.2–1)
BUN SERPL-MCNC: 43 MG/DL (ref 5–25)
C DIFF TOX B TCDB STL QL NAA+PROBE: NEGATIVE
CALCIUM ALBUM COR SERPL-MCNC: 9.1 MG/DL (ref 8.3–10.1)
CALCIUM SERPL-MCNC: 8 MG/DL (ref 8.3–10.1)
CHLORIDE SERPL-SCNC: 107 MMOL/L (ref 100–108)
CO2 SERPL-SCNC: 15 MMOL/L (ref 21–32)
CREAT SERPL-MCNC: 3.89 MG/DL (ref 0.6–1.3)
GFR SERPL CREATININE-BSD FRML MDRD: 11 ML/MIN/1.73SQ M
GLUCOSE SERPL-MCNC: 101 MG/DL (ref 65–140)
POTASSIUM SERPL-SCNC: 3.3 MMOL/L (ref 3.5–5.3)
PROT SERPL-MCNC: 6.9 G/DL (ref 6.4–8.2)
SODIUM SERPL-SCNC: 134 MMOL/L (ref 136–145)

## 2021-07-16 PROCEDURE — 36556 INSERT NON-TUNNEL CV CATH: CPT

## 2021-07-16 PROCEDURE — 99232 SBSQ HOSP IP/OBS MODERATE 35: CPT | Performed by: INTERNAL MEDICINE

## 2021-07-16 PROCEDURE — NC001 PR NO CHARGE: Performed by: PHYSICIAN ASSISTANT

## 2021-07-16 PROCEDURE — 99233 SBSQ HOSP IP/OBS HIGH 50: CPT | Performed by: NURSE PRACTITIONER

## 2021-07-16 PROCEDURE — 80053 COMPREHEN METABOLIC PANEL: CPT | Performed by: NURSE PRACTITIONER

## 2021-07-16 PROCEDURE — 0JH63XZ INSERTION OF TUNNELED VASCULAR ACCESS DEVICE INTO CHEST SUBCUTANEOUS TISSUE AND FASCIA, PERCUTANEOUS APPROACH: ICD-10-PCS | Performed by: RADIOLOGY

## 2021-07-16 PROCEDURE — 76937 US GUIDE VASCULAR ACCESS: CPT | Performed by: RADIOLOGY

## 2021-07-16 PROCEDURE — C1751 CATH, INF, PER/CENT/MIDLINE: HCPCS

## 2021-07-16 PROCEDURE — C1894 INTRO/SHEATH, NON-LASER: HCPCS

## 2021-07-16 PROCEDURE — 97166 OT EVAL MOD COMPLEX 45 MIN: CPT

## 2021-07-16 PROCEDURE — 77001 FLUOROGUIDE FOR VEIN DEVICE: CPT

## 2021-07-16 PROCEDURE — 76937 US GUIDE VASCULAR ACCESS: CPT

## 2021-07-16 PROCEDURE — 02H633Z INSERTION OF INFUSION DEVICE INTO RIGHT ATRIUM, PERCUTANEOUS APPROACH: ICD-10-PCS | Performed by: RADIOLOGY

## 2021-07-16 PROCEDURE — B5181ZA FLUOROSCOPY OF SUPERIOR VENA CAVA USING LOW OSMOLAR CONTRAST, GUIDANCE: ICD-10-PCS | Performed by: RADIOLOGY

## 2021-07-16 PROCEDURE — 36556 INSERT NON-TUNNEL CV CATH: CPT | Performed by: RADIOLOGY

## 2021-07-16 PROCEDURE — 77001 FLUOROGUIDE FOR VEIN DEVICE: CPT | Performed by: RADIOLOGY

## 2021-07-16 RX ORDER — POTASSIUM CHLORIDE 14.9 MG/ML
20 INJECTION INTRAVENOUS
Status: COMPLETED | OUTPATIENT
Start: 2021-07-16 | End: 2021-07-17

## 2021-07-16 RX ORDER — LIDOCAINE HYDROCHLORIDE 10 MG/ML
INJECTION, SOLUTION EPIDURAL; INFILTRATION; INTRACAUDAL; PERINEURAL CODE/TRAUMA/SEDATION MEDICATION
Status: COMPLETED | OUTPATIENT
Start: 2021-07-16 | End: 2021-07-16

## 2021-07-16 RX ADMIN — LIDOCAINE HYDROCHLORIDE 10 ML: 10 INJECTION, SOLUTION EPIDURAL; INFILTRATION; INTRACAUDAL; PERINEURAL at 19:14

## 2021-07-16 RX ADMIN — APIXABAN 2.5 MG: 2.5 TABLET, FILM COATED ORAL at 08:29

## 2021-07-16 RX ADMIN — Medication 2000 UNITS: at 08:29

## 2021-07-16 RX ADMIN — CEFEPIME HYDROCHLORIDE 1000 MG: 1 INJECTION, POWDER, FOR SOLUTION INTRAMUSCULAR; INTRAVENOUS at 04:35

## 2021-07-16 RX ADMIN — OXYCODONE HYDROCHLORIDE AND ACETAMINOPHEN 1000 MG: 500 TABLET ORAL at 08:29

## 2021-07-16 RX ADMIN — ZINC SULFATE 220 MG (50 MG) CAPSULE 220 MG: CAPSULE at 08:29

## 2021-07-16 RX ADMIN — CITALOPRAM HYDROBROMIDE 20 MG: 20 TABLET ORAL at 12:35

## 2021-07-16 RX ADMIN — REMDESIVIR 200 MG: 100 INJECTION, POWDER, LYOPHILIZED, FOR SOLUTION INTRAVENOUS at 18:00

## 2021-07-16 RX ADMIN — ATORVASTATIN CALCIUM 40 MG: 40 TABLET, FILM COATED ORAL at 17:53

## 2021-07-16 RX ADMIN — METOPROLOL TARTRATE 25 MG: 25 TABLET, FILM COATED ORAL at 17:53

## 2021-07-16 RX ADMIN — METOPROLOL TARTRATE 25 MG: 25 TABLET, FILM COATED ORAL at 08:29

## 2021-07-16 RX ADMIN — OXYCODONE HYDROCHLORIDE AND ACETAMINOPHEN 1000 MG: 500 TABLET ORAL at 21:23

## 2021-07-16 RX ADMIN — POTASSIUM CHLORIDE 20 MEQ: 14.9 INJECTION, SOLUTION INTRAVENOUS at 23:26

## 2021-07-16 RX ADMIN — Medication 250 MG: at 08:31

## 2021-07-16 RX ADMIN — Medication 125 MG: at 00:46

## 2021-07-16 RX ADMIN — Medication 125 MG: at 05:37

## 2021-07-16 RX ADMIN — Medication 125 MG: at 23:33

## 2021-07-16 RX ADMIN — Medication 125 MG: at 12:34

## 2021-07-16 RX ADMIN — SODIUM BICARBONATE 100 ML/HR: 84 INJECTION, SOLUTION INTRAVENOUS at 21:35

## 2021-07-16 RX ADMIN — CALCITRIOL CAPSULES 0.25 MCG 0.25 MCG: 0.25 CAPSULE ORAL at 08:31

## 2021-07-16 RX ADMIN — FAMOTIDINE 20 MG: 20 TABLET ORAL at 08:33

## 2021-07-16 RX ADMIN — Medication 125 MG: at 17:54

## 2021-07-16 RX ADMIN — LOPERAMIDE HYDROCHLORIDE 2 MG: 2 CAPSULE ORAL at 18:03

## 2021-07-16 RX ADMIN — LEVOTHYROXINE SODIUM 75 MCG: 75 TABLET ORAL at 05:37

## 2021-07-16 RX ADMIN — MELATONIN TAB 3 MG 3 MG: 3 TAB at 21:23

## 2021-07-16 RX ADMIN — ONDANSETRON 4 MG: 2 INJECTION INTRAMUSCULAR; INTRAVENOUS at 09:58

## 2021-07-16 NOTE — NURSING NOTE
Nursing able to obtain bloodwork for 7/15 and insert new IV site with ultrasound guidance at 2100  That IV has infiltrated this AM, arm is now swollen  Unable to obtain 7/16 am labs   SLIM made aware of pt's need for PICC placement

## 2021-07-16 NOTE — PROGRESS NOTES
[address] [facility]  Progress Note - Celia Puente [birth year], 77 y o  female MRN: [id number]  Unit/Bed#: -01 Encounter: [id number]  Primary Care Provider: Huy Mchugh MD   Date and time admitted to hospital: 7/15/2021 10:52 AM    COVID-19 virus infection  Assessment & Plan  Mild pathway:  · presented with fever, generalized weakness, diarrhea decreasing oral intake and difficulty with ambulation, (+) COVID  · Pt is not vaccinated   · Unable to obtain labs today- will obtain once central line is placed  · Check cbc and cmp daily   · Check crp, ddimer and ferritin in am   · C/w vitamin D, vitamin C and MVI   · Since patient is considered high risk will initiate remdesivir today given continued GI complaints- this was discussed with Dr Esther Troy given LUANN/CKD5  · Chest xray- Question mild bilateral groundglass opacity in the upper lungs  · Initial procal (-)  · Pt is on room air   · C/w elequis for DVT prophylaxis which she is on for PE tx        * Febrile illness  Assessment & Plan  · In the setting of COVID infection- refer to Lydia Morillo for plan   · cdiff culture negative   · Urine culture- mixed contaminant x4- follow to end points  · D/c cefepime at this time given (-) procal and + COVID dx   · Follow blood cultures  · Chest xray- Question mild bilateral groundglass opacity in the upper lungs    Acute renal failure superimposed on stage 5 chronic kidney disease, not on chronic dialysis [facility])  Assessment & Plan  Lab Results   Component Value Date    EGFR 10 07/15/2021    EGFR 11 07/01/2021    EGFR 13 05/21/2021    CREATININE 4 09 (H) 07/15/2021    CREATININE 3 86 (H) 07/01/2021    CREATININE 3 40 (H) 05/21/2021     · likely prerenal azotemia given poor intake and diarrhea  +/- component of COVID-19  · Cr baseline 3 4-3 8, follows with Dr Linda De Leon  · D/w Dr Esther Troy- unable to obtain labs today given many unsuccessful attempts plus patient only has a peripheral IV 22g in place   Will place an order for IR to place a central line at this time to obtain labs and administer IVFs- obtain labs after central line placement  · IV fluid for hydration for now until labs available to adjust   · Possible need for HD discussed with the patient and consent obtained by nephro    Metabolic acidosis  Assessment & Plan  · Nephrology following  · C/w IV bicarb infusion  · Pending repeat labs today     Class 2 severe obesity due to excess calories with serious comorbidity and body mass index (BMI) of 35 0 to 35 9 in adult [facility])  Assessment & Plan  · BMI 35 39- noted     History of Clostridioides difficile colitis  Assessment & Plan  · cdiff culture negative  · Was given CTX in the ED and given 2 doses of cefepime  · Given hx of cdiff will c/w oral vancomycin for 48 hrs post abxs  · Diarrhea on admission likely secondary to covid dx    Anemia in CKD (chronic kidney disease)  Assessment & Plan  Lab Results   Component Value Date    HGB 9 6 (L) 07/15/2021    HGB 9 9 (L) 07/01/2021    HGB 9 2 (L) 06/17/2021   stable at baseline- c/w monitoring    Polycythemia vera ([facility] [address] )  Assessment & Plan  Patient follows up with outpatient Hematology Oncology, currently on Jakafi 10 mg Oral Daily    Obstructive uropathy  Assessment & Plan  S/p ileostomy for nonfunctioning bladder and chronic hydro  · OP urology follow up    Chronic saddle pulmonary embolism (HCC)  Assessment & Plan  Continue Eliquis        VTE Pharmacologic Prophylaxis: VTE Score: 8 High Risk (Score >/= 5) - Pharmacological DVT Prophylaxis Ordered: apixaban (Eliquis)  Sequential Compression Devices Ordered  Patient Centered Rounds: I performed bedside rounds with nursing staff today  Discussions with Specialists or Other Care Team Provider: d/w RN     Education and Discussions with Family / Patient: Updated  (son) via phone  Time Spent for Care: 45 minutes   More than 50% of total time spent on counseling and coordination of care as described above     Current Length of Stay: 1 day(s)  Current Patient Status: Inpatient    Certification Statement: The patient will continue to require additional inpatient hospital stay due to renal failure, covid   Discharge Plan: unstable at this time     Code Status: Level 1 - Full Code    Subjective:   Pt reports she is feeling tired, weak  +nausea, vomited x1 this am  + diarrhea per patient but RN reports no diarrhea  Denies SOB or difficulty breathing, currently on room air  No other complaints  Objective:     Vitals:   Temp (24hrs), Av 1 °F (37 3 °C), Min:98 5 °F (36 9 °C), Max:99 7 °F (37 6 °C)    Temp:  [98 5 °F (36 9 °C)-99 7 °F (37 6 °C)] 99 7 °F (37 6 °C)  HR:  [72] 72  Resp:  [18] 18  BP: (135)/(74) 135/74  SpO2:  [98 %] 98 %  Body mass index is 35 39 kg/m²  Input and Output Summary (last 24 hours): Intake/Output Summary (Last 24 hours) at 2021 1507  Last data filed at 2021 0900  Gross per 24 hour   Intake 255 ml   Output 562 ml   Net -307 ml       Physical Exam:   Physical Exam  Vitals and nursing note reviewed  Constitutional:       General: She is not in acute distress  Appearance: She is obese  She is ill-appearing  Cardiovascular:      Rate and Rhythm: Normal rate and regular rhythm  Heart sounds: Murmur heard  Pulmonary:      Effort: Pulmonary effort is normal  No respiratory distress  Breath sounds: Normal breath sounds  No wheezing or rales  Abdominal:      General: Bowel sounds are normal  There is no distension  Palpations: Abdomen is soft  Tenderness: There is no abdominal tenderness  Genitourinary:     Comments: Hazel colored urine with sediment present in ileal conduit   Musculoskeletal:         General: Swelling (b/l arms ) present  Skin:     General: Skin is warm and dry  Findings: Bruising (left arm ) present  Neurological:      Mental Status: She is alert  Mental status is at baseline     Psychiatric:      Comments: flat Additional Data:     Labs:  Results from last 7 days   Lab Units 07/15/21  1152   WBC Thousand/uL 2 92*   HEMOGLOBIN g/dL 9 6*   HEMATOCRIT % 29 9*   PLATELETS Thousands/uL 220   NEUTROS PCT % 79*   LYMPHS PCT % 12*   MONOS PCT % 8   EOS PCT % 0     Results from last 7 days   Lab Units 07/15/21  1152   SODIUM mmol/L 135*   POTASSIUM mmol/L 3 6   CHLORIDE mmol/L 110*   CO2 mmol/L 13*   BUN mg/dL 44*   CREATININE mg/dL 4 09*   ANION GAP mmol/L 12   CALCIUM mg/dL 8 7   ALBUMIN g/dL 3 0*   TOTAL BILIRUBIN mg/dL 0 48   ALK PHOS U/L 56   ALT U/L 13   AST U/L 39   GLUCOSE RANDOM mg/dL 110                 Results from last 7 days   Lab Units 07/15/21  1152   LACTIC ACID mmol/L 1 1   PROCALCITONIN ng/ml 0 12       Lines/Drains:  Invasive Devices     Peripheral Intravenous Line            Peripheral IV 07/15/21 Left Hand 1 day          Line            Hemodialysis AV Fistula Right Upper arm -- days          Drain            Urostomy Ileal conduit RUQ 1745 days    Nephrostomy Left 2 8 Fr  37 days    Percutaneous Nephroureteral Tube (PCNU) Left 1 10 Fr 40 Cm 37 days                      Imaging: Reviewed radiology reports from this admission including: chest xray    Recent Cultures (last 7 days):   Results from last 7 days   Lab Units 07/15/21  2201 07/15/21  1151 07/15/21  1149   BLOOD CULTURE   --  Received in Microbiology Lab  Culture in Progress  Received in Microbiology Lab  Culture in Progress    --    URINE CULTURE   --   --  >100,000 cfu/ml    C DIFF TOXIN B BY PCR  Negative  --   --        Last 24 Hours Medication List:   Current Facility-Administered Medications   Medication Dose Route Frequency Provider Last Rate    acetaminophen  650 mg Oral Q4H PRN Elena Kerens, DO      apixaban  2 5 mg Oral Daily Elena Kerens, DO      ascorbic acid  1,000 mg Oral Q12H [address] Elena Kerens, DO      atorvastatin  40 mg Oral Daily With "[employer] DO      calcitriol  0 25 mcg Oral Daily Elena Kerens, DO      cholecalciferol  2,000 Units Oral Daily [facility], DO      citalopram  20 mg Oral Daily [facility], DO      famotidine  20 mg Oral Daily [facility], DO      levothyroxine  75 mcg Oral Daily [facility], DO      loperamide  2 mg Oral 4x Daily PRN [facility], DO      melatonin  3 mg Oral HS [facility], DO      metoprolol tartrate  25 mg Oral BID [facility], DO      zinc sulfate  220 mg Oral Daily [facility], DO      Followed by   iMc Lazo ON 7/22/2021] multivitamin-minerals  1 tablet Oral Daily [facility], DO      ondansetron  4 mg Intravenous Q4H PRN [facility], DO      remdesivir  200 mg Intravenous Q24H VICTORINA Gil      Followed by   Mic Lazo ON 7/17/2021] remdesivir  100 mg Intravenous Q24H VICTORINA Shah      ruxolitinib  10 mg Oral Every Other Day [facility], DO      saccharomyces boulardii  250 mg Oral Daily [facility], DO      sodium bicarbonate infusion  100 mL/hr Intravenous Continuous Preeti Yoan Swift,  100 mL/hr (07/15/21 1957)    vancomycin  125 mg Oral Q6H [facility], DO          Today, Patient Was Seen By: VICTORINA Shah    **Please Note: This note may have been constructed using a voice recognition system  **

## 2021-07-16 NOTE — SEDATION DOCUMENTATION
Non-tunneled CVC placed by Dr [place], without complication  Patient tolerated well, vss   Report called to Primary Rn

## 2021-07-16 NOTE — ASSESSMENT & PLAN NOTE
Mild pathway:  · presented with fever, generalized weakness, diarrhea decreasing oral intake and difficulty with ambulation, (+) COVID  · Pt is not vaccinated   · Unable to obtain labs today- will obtain once central line is placed  · Check cbc and cmp daily   · Check crp, ddimer and ferritin in am   · C/w vitamin D, vitamin C and MVI   · Since patient is considered high risk will initiate remdesivir today given continued GI complaints- this was discussed with Dr Humberto Schroeder given LUANN/CKD5  · Chest xray- Question mild bilateral groundglass opacity in the upper lungs  · Initial procal (-)  · Pt is on room air   · C/w elequis for DVT prophylaxis which she is on for PE tx

## 2021-07-16 NOTE — DISCHARGE INSTRUCTIONS
Non-tunneled Central Lines   WHAT YOU NEED TO KNOW:   A non-tunneled central line is a short-term IV catheter placed into a large vein near your neck, chest, or groin  You will need to flush and care for your central line as directed  WHILE YOU ARE HERE:   Before your procedure:   · Informed consent  is a legal document that explains the tests, treatments, or procedures that you may need  Informed consent means you understand what will be done and can make decisions about what you want  You give your permission when you sign the consent form  You can have someone sign this form for you if you are not able to sign it  You have the right to understand your medical care in words you know  Before you sign the consent form, understand the risks and benefits of what will be done  Make sure all your questions are answered  · Medicine  may be given to help you relax  General anesthesia may also be given to keep you asleep and free from pain during the procedure  You may instead get local anesthesia to numb the procedure area  You may feel pressure or pushing during the procedure, but you should not feel pain  During your procedure:   · The table may be tipped so that your head is slightly lower than your feet  Your healthcare provider will insert a needle through your skin until the needle reaches your vein  Ultrasound or x-ray may be used to help guide placement of the catheter  The catheter may be placed through your chest, neck, or arm  · A guide wire will be used to help place the catheter in your vein  A catheter that contains or is coated with germ-killing medicine may be used to help prevent infection  · The needle and guide wire will be removed, and the catheter will stay in the vein  Healthcare providers will secure the catheter to your skin with tape or stitches  A new bandage will be placed over the area to keep it clean and help prevent infection      After your procedure:  Do not get out of bed until healthcare providers say it is okay  You will need to rest for a period of time  You may need to have a chest x-ray  The central line will be flushed with saline solution, heparin, or both  Saline and heparin are used to help keep the catheter open and clear  Heparin may help stop blood from clotting inside the catheter  RISKS:   · The catheter may go into the wrong area or blood vessel during the procedure  Air or blood may get into the space around your lungs, causing heart or lung problems  You may get an infection where the catheter enters your body, or in your bloodstream  The catheter may break, bend, or move out of place, and not work  You may need to have the catheter replaced  · Medicine may leak on your skin, causing swelling, pain and blisters  You can have bleeding, an allergy to heparin, or heparin-induced thrombocytopenia (HIT)  HIT is a low number of blood platelets, which increases the risk of bleeding  You may get a blood clot in the vein where your catheter was placed  This can cause pain and swelling, and it can stop blood from flowing where it needs to go in your body  The blood clot may break loose and travel to your lungs  This can be life-threatening  CARE AGREEMENT:   You have the right to help plan your care  Learn about your health condition and how it may be treated  Discuss treatment options with your healthcare providers to decide what care you want to receive  You always have the right to refuse treatment  © Copyright [address] Information is for End User's use only and may not be sold, redistributed or otherwise used for commercial purposes  All illustrations and images included in CareNotes® are the copyrighted property of A D A M , Inc  or [facility] Vidya Will   The above information is an  only  It is not intended as medical advice for individual conditions or treatments   Talk to your doctor, nurse or pharmacist before following any medical regimen to see if it is safe and effective for you

## 2021-07-16 NOTE — PROGRESS NOTES
NEPHROLOGY PROGRESS NOTE   Sofi Coffey 77 y o  female MRN: [id number]  Unit/Bed#: -01 Encounter: [id number]  Reason for Consult:  Acute kidney disease    Assessment/Plan:  1  Acute on chronic kidney disease (POA), suspecting likely prerenal azotemia given poor intake and diarrhea  +/- component of COVID-19  2  Chronic kidney disease stage 5  Baseline creatinine mid threes most recent outpatient creatinine 3 86 estimated GFR 11  3  Severe metabolic acidosis, predominantly non gap - continue sodium bicarbonate infusion  4  Solitary kidney function with advanced left renal atrophy  5  Left-sided hydronephrosis with recent history of left-sided nephrostomy tube  6  COVID-19 infection, as per primary service  7  Diarrhea with history of C diff colitis, currently on empiric oral vancomycin  8  History of pulmonary embolism currently Eliquis  9  Polycythemia vera, following with Hematology Oncology, currently Jakafi  10  Anemia of chronic disease, continue monitor closely      PLAN:  · Continue with sodium bicarbonate infusion  · Unfortunately unable to draw laboratory studies  Patient scheduled for central catheter placement  · Hopeful improvement with IV fluids  · Given patient's advanced CKD, discussed the potential for possible needing renal replacement therapy  Consent obtained, placed on chart  SUBJECTIVE:  Seen examined  Patient fatigued and tired  Persistent diarrhea  Denies any chest pain or shortness of breath or cough      Review of Systems    OBJECTIVE:  Current Weight: Weight - Scale: 82 2 kg (181 lb 3 5 oz)  Vitals:    07/15/21 1400 07/15/21 1541 07/15/21 1750 07/16/21 0700   BP: 146/68 135/74     BP Location:       Pulse: 72 72     Resp: 20 18     Temp:  98 5 °F (36 9 °C)  99 7 °F (37 6 °C)   TempSrc:  Oral  Oral   SpO2: 96% 98% 98%    Weight:   82 2 kg (181 lb 3 5 oz)    Height:   5' (1 524 m)        Intake/Output Summary (Last 24 hours) at 7/16/2021 1359  Last data filed at 7/16/2021 0900  Gross per 24 hour   Intake 255 ml   Output 562 ml   Net -307 ml       Physical Exam  Constitutional:       Appearance: She is ill-appearing  HENT:      Head: Normocephalic and atraumatic  Eyes:      General: No scleral icterus  Cardiovascular:      Rate and Rhythm: Normal rate and regular rhythm  Pulmonary:      Effort: Pulmonary effort is normal       Breath sounds: Normal breath sounds  Abdominal:      Palpations: Abdomen is soft  Tenderness: There is no abdominal tenderness  Musculoskeletal:      Right lower leg: No edema  Left lower leg: No edema  Skin:     General: Skin is warm and dry  Findings: No rash  Neurological:      Mental Status: She is alert and oriented to person, place, and time           Medications:    Current Facility-Administered Medications:     acetaminophen (TYLENOL) tablet 650 mg, 650 mg, Oral, Q4H PRN, Laveda Ratel, DO    apixaban Venkatesh Tahir) tablet 2 5 mg, 2 5 mg, Oral, Daily, Laveda Ratel, DO, 2 5 mg at 07/16/21 0829    ascorbic acid (VITAMIN C) tablet 1,000 mg, 1,000 mg, Oral, Q12H [address], Laveda Ratel, DO, 1,000 mg at 07/16/21 0829    atorvastatin (LIPITOR) tablet 40 mg, 40 mg, Oral, Daily With Samantha List, DO, 40 mg at 07/15/21 1826    calcitriol (ROCALTROL) capsule 0 25 mcg, 0 25 mcg, Oral, Daily, Laveda Ratel, DO, 0 25 mcg at 07/16/21 0831    cholecalciferol (VITAMIN D3) tablet 2,000 Units, 2,000 Units, Oral, Daily, Laveda Ratel, DO, 2,000 Units at 07/16/21 0829    citalopram (CeleXA) tablet 20 mg, 20 mg, Oral, Daily, Laveda Ratel, DO, 20 mg at 07/16/21 1235    famotidine (PEPCID) tablet 20 mg, 20 mg, Oral, Daily, Laveda Ratel, DO, 20 mg at 07/16/21 1114    levothyroxine tablet 75 mcg, 75 mcg, Oral, Daily, Laveda Ratel, DO, 75 mcg at 07/16/21 0537    loperamide (IMODIUM) capsule 2 mg, 2 mg, Oral, 4x Daily PRN, Laveda Ratel, DO    melatonin tablet 3 mg, 3 mg, Oral, HS, Dena Ratel, DO, 3 mg at 07/15/21 2123    metoprolol tartrate (LOPRESSOR) tablet 25 mg, 25 mg, Oral, BID, Algernon Beady, DO, 25 mg at 07/16/21 9608    zinc sulfate (ZINCATE) capsule 220 mg, 220 mg, Oral, Daily, 220 mg at 07/16/21 0829 **FOLLOWED BY** [START ON 7/22/2021] multivitamin-minerals (CENTRUM ADULTS) tablet 1 tablet, 1 tablet, Oral, Daily, Algernon Beady, DO    ondansetron [facility]) injection 4 mg, 4 mg, Intravenous, Q4H PRN, Algernon Beady, DO, 4 mg at 07/16/21 2114    remdesivir (Veklury) 200 mg in sodium chloride 0 9 % 250 mL IVPB, 200 mg, Intravenous, Q24H **FOLLOWED BY** [START ON 7/17/2021] remdesivir (Veklury) 100 mg in sodium chloride 0 9 % 250 mL IVPB, 100 mg, Intravenous, Q24H, VICTORINA Gil    ruxolitinib (JAKAFI) tablet 10 mg, 10 mg, Oral, Every Other Day, Algernon Beady, DO    saccharomyces boulardii (FLORASTOR) capsule 250 mg, 250 mg, Oral, Daily, Algernon Beady, DO, 250 mg at 07/16/21 0831    sodium bicarbonate 150 mEq in dextrose 5 % 1,000 mL infusion, 100 mL/hr, Intravenous, Continuous, [facility], , Last Rate: 100 mL/hr at 07/15/21 1957, 100 mL/hr at 07/15/21 1957    vancomycin (VANCOCIN) oral solution 125 mg, 125 mg, Oral, Q6H [address], Algernon Beady, DO, 125 mg at 07/16/21 1234    Laboratory Results:  Results from last 7 days   Lab Units 07/15/21  1152   WBC Thousand/uL 2 92*   HEMOGLOBIN g/dL 9 6*   HEMATOCRIT % 29 9*   PLATELETS Thousands/uL 220   POTASSIUM mmol/L 3 6   CHLORIDE mmol/L 110*   CO2 mmol/L 13*   BUN mg/dL 44*   CREATININE mg/dL 4 09*   CALCIUM mg/dL 8 7

## 2021-07-16 NOTE — ASSESSMENT & PLAN NOTE
· cdiff culture negative  · Was given CTX in the ED and given 2 doses of cefepime     · Given hx of cdiff will c/w oral vancomycin for 48 hrs post abxs  · Diarrhea on admission likely secondary to covid dx

## 2021-07-16 NOTE — UTILIZATION REVIEW
Inpatient Admission Authorization Request   NOTIFICATION OF INPATIENT ADMISSION/INPATIENT AUTHORIZATION REQUEST   SERVICING FACILITY:   [facility]  Address: [address] [place] [address], [address] [place] [address]  Tax ID: [id number]  NPI: [id number]  Place of Service: Inpatient [address] [facility] Code: 24     ATTENDING PROVIDER:  Attending Name and NPI#: Cem Howe Md [[id number]]  Address: [address] [place] [address], [address] [place] [id number]  Phone: [phone]     UTILIZATION REVIEW CONTACT:  Cheryl Morales Utilization   Network Utilization Review Department  Phone: [phone]  Fax: [phone]  Email: [email or web address]     PHYSICIAN ADVISORY SERVICES:  FOR [id number] REVIEW - MEDICAL NECESSITY DENIAL  Phone: [phone]  Fax: [phone]  Email: [email or web address]     TYPE OF REQUEST:  Inpatient Status     ADMISSION INFORMATION:  ADMISSION DATE/TIME: 7/15/21  2:12 PM  PATIENT DIAGNOSIS CODE/DESCRIPTION:  UTI (urinary tract infection) [N39 0]  Fever [R50 9]  Generalized weakness [R53 1]  DISCHARGE DATE/TIME: No discharge date for patient encounter  DISCHARGE DISPOSITION (IF DISCHARGED): Home/Self Care     IMPORTANT INFORMATION:  Please contact the Cheryl Morales directly with any questions or concerns regarding this request  Department voicemails are confidential     Send requests for admission clinical reviews, concurrent reviews, approvals, and administrative denials due to lack of clinical to fax [phone]

## 2021-07-16 NOTE — ASSESSMENT & PLAN NOTE
· In the setting of COVID infection- refer to [facility] for plan   · cdiff culture negative   · Urine culture- mixed contaminant x4- follow to end points  · D/c cefepime at this time given (-) procal and + COVID dx   · Follow blood cultures  · Chest xray- Question mild bilateral groundglass opacity in the upper lungs

## 2021-07-16 NOTE — OCCUPATIONAL THERAPY NOTE
Occupational Therapy Evaluation     Patient Name: Sherren Greenspan TKAOO'O Date: 7/16/2021  Problem List  Principal Problem:    Febrile illness  Active Problems:    Chronic saddle pulmonary embolism ([facility] [address] )    Obstructive uropathy    Polycythemia vera ([facility] [address] )    Anemia in CKD (chronic kidney disease)    Acute renal failure superimposed on stage 5 chronic kidney disease, not on chronic dialysis ([facility] [address] )    History of Clostridioides difficile colitis    Class 2 severe obesity due to excess calories with serious comorbidity and body mass index (BMI) of 35 0 to 35 9 in adult [facility])    COVID-19 virus infection    Metabolic acidosis    Past Medical History  Past Medical History:   Diagnosis Date    Anxiety     Bowel obstruction (HCC)     Chronic kidney disease (CKD), stage IV (severe) (HCC)     stage IV    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    Circulation problem     Compression fracture of cervical spine (HCC)     Hernia of abdominal cavity     History of kidney problems     Hydronephrosis     Hypertension     IBS (irritable bowel syndrome)     Incontinence     Lung mass     Improving on PET/CT 1/2016    Polycythemia vera ([facility] [address] )     Pulmonary embolism ([facility] [address] ) 2014    Shingles     Urinary tract infection      Past Surgical History  Past Surgical History:   Procedure Laterality Date    ABDOMINAL ADHESION SURGERY N/A 8/9/2020    Procedure: LYSIS ADHESIONS;  Surgeon: Mark Ferguson DO;  [facility];  Service: General    BLADDER SUSPENSION      BOTOX INJECTION N/A 7/27/2016    Procedure: BOTOX INJECTION ;  Surgeon: Desirae Johnson MD;  [facility];  Service:     CHOLECYSTECTOMY N/A     COLONOSCOPY      CYSTECTOMY, RADICAL WITH ILEOCONDUIT N/A 10/4/2016    Procedure: SUPRATRIGONAL CYSTECTOMY WITH ILEAL CONDUIT ;  Surgeon: Desirae Johnson MD;  [facility];  Service:     CYSTOSCOPY W/ RETROGRADES Bilateral 7/27/2016    Procedure: CYSTOSCOPY; RETROGRADE PYELOGRAM ;  Surgeon: Conrad Chen MD;  [facility];  Service:     HERNIA REPAIR      IR AV FISTULAGRAM/GRAFTOGRAM  2/10/2021    IR NEPHROSTOMY TO NEPHROURETERAL STENT  5/15/2021    IR NEPHROSTOMY TO NEPHROURETERAL STENT  6/9/2021    IR NEPHROSTOMY TUBE CHECK AND/OR REMOVAL  6/16/2021    IR NEPHROSTOMY TUBE CHECK/CHANGE/REPOSITION/REINSERTION/UPSIZE  5/14/2021    IR NEPHROSTOMY TUBE CHECK/CHANGE/REPOSITION/REINSERTION/UPSIZE  5/21/2021    IR NEPHROSTOMY TUBE PLACEMENT  5/10/2021    IR NON-TUNNELED CENTRAL LINE PLACEMENT  7/17/2020    IR NON-TUNNELED CENTRAL LINE PLACEMENT  8/14/2020    LAPAROTOMY N/A 8/9/2020    Procedure: LAPAROTOMY EXPLORATORY, PARASTOMAL HERNIA REPAIR WITH MESH;  Surgeon: Vonda Bell DO;  [facility];  Service: General    SD ANASTOMOSIS,AV,ANY SITE Right 1/5/2021    Procedure: Creation of right brachiobasilic fistula; Surgeon: Shay Chairez MD;  [facility];  Service: Vascular    SD COLONOSCOPY FLX DX W/COLLJ SPEC WHEN PFRMD N/A 8/31/2016    Procedure: COLONOSCOPY;  Surgeon: Romero Fritz MD;  Location: [facility]; Service: Gastroenterology    SD CYSTOSCOPY,INSERT URETERAL STENT Bilateral 7/27/2016    Procedure: STENT INSERTION; EXCISION OF MESH ;  Surgeon: Conrad Chen MD;  [facility];  Service: Urology    TONSILLECTOMY      TUBAL LIGATION      WISDOM TOOTH EXTRACTION               07/16/21 1531   OT Last Visit   OT Visit Date 07/16/21   Note Type   Note type Evaluation  (bed-level)   Restrictions/Precautions   Weight Bearing Precautions Per Order No   Other Precautions Contact/isolation; Airborne/isolation;Cognitive;Multiple lines;Telemetry; Fall Risk;Pain  (COVID(+))   Pain Assessment   Pain Assessment Tool Pain Assessment not indicated - pt denies pain   Pain Score No Pain   Home Living   Type of Home House   Home Layout Multi-level;Bed/bath upstairs  (6 MARLON; 2nd floor setup)   Bathroom Shower/Tub Walk-in shower   Bathroom Toilet Standard   Bathroom Equipment Shower chair;Grab bars in shower;Grab bars around [employer]   Additional Comments Pt reports living in 2 SH, 6 MARLON, no 1st floor setup, bed/bath 2nd floor   Prior Function   Level of [place] Independent with ADLs and functional mobility   Lives With Son   ADL Assistance Independent   IADLs Independent   Falls in the last 6 months 1 to 4  (1 recent fall per EMR)   Vocational Retired   Comments Pt reports being I w/ ADLS/IADLS, per EMR pt's son has austim, pt is primary caregiver and performs IADLS for son  Pt reports having RW and SPC and reports using both; per EMR pt uses cane out in community and nothing within the home  Lifestyle   Autonomy I ADLS/IADLS, transfers and functional mobility PTA   Reciprocal Relationships Pt lives w/ her son who per EMR has autism and pt is primary caregiver; pt also has 2nd son who doesn't live w/ them   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; was a head    Intrinsic Gratification Pt reports no hobbies; per EMR enjoys reading, Je[employer] witness and hanging w/ the neighbor friends   Psychosocial   Psychosocial (WDL) X   Patient Behaviors/Mood Flat affect   Subjective   Subjective "I''m just tired"   ADL   Eating Assistance 5  Supervision/Setup   Grooming Assistance 4  Minimal Assistance   UB Bathing Assistance 3  Moderate Assistance   LB Bathing Assistance 2  Maximal Assistance   UB Dressing Assistance 3  Moderate Assistance   LB Dressing Assistance 2  Maximal Assistance   Toileting Assistance  1  Total Assistance   Toileting Deficit   (urostomy)   Functional Assistance 3  Moderate Assistance   Functional Deficit   (bed level)   Bed Mobility   Rolling R 2  Maximal assistance   Additional items Assist x 1; Increased time required;Verbal cues;LE management; Bedrails   Rolling L 2  Maximal assistance   Additional items Assist x 1; Increased time required;LE management;Verbal cues; Bedrails   Supine to Sit 3  Moderate assistance   Additional items Assist x 1;HOB elevated; Increased time required;Verbal cues;LE management; Bedrails   Sit to Supine 3  Moderate assistance   Additional items Assist x 1; Increased time required;Verbal cues;LE management   Additional Comments Pt went from supine<>sit w/ Mod A x1 for UB support and LE management, HOB elevated for assist  Pt has very slowed mvmts, needs VC/TC for encouragement to participate  Sat EOB for less than 1 minute before reporting "I can't do this " Pt reports no pain, O2 sats remained in low 90's and reports no shortness of breath  Pt incontinent of bowel; returned to supine and rolled L/R w/ Max A x1 for initation into rolling and assist in side lying   Transfers   Sit to Stand Unable to assess   Stand to Sit Unable to assess   Additional Comments Pt unable to tolerate EOB sitting; will continue to assess as able   Functional Mobility   Additional Comments Unable to assess   Balance   Static Sitting Poor   Dynamic Sitting Poor   Static Standing Zero   Activity Tolerance   Activity Tolerance Patient limited by fatigue   Medical Staff Made Aware RN   Nurse Made Aware yes, [place] Assessment   RUE Assessment X  (generalized weakness)   LUE Assessment   LUE Assessment X  (generalized weakness)   Hand Function   Gross Motor Coordination Impaired   Fine Motor Coordination Functional   Sensation   Light Touch No apparent deficits   Cognition   Overall Cognitive Status Impaired   Arousal/Participation Arousable;Lethargic;Poorly responsive   Attention Attends with cues to redirect   Orientation Level Oriented X4   Memory Decreased recall of precautions   Following Commands Follows one step commands without difficulty   Comments Pt is cooperative; very lethargic; minimally responsive; very slow to respond  Appears to have depressive like symptoms; unmotivated at this time to participate in therapy  Needs continous encouragement and reports several times feeling fatigued     Assessment Limitation Decreased ADL status; Decreased UE strength;Decreased Safe judgement during ADL;Decreased sensation;Decreased cognition;Decreased self-care trans;Decreased high-level ADLs   Prognosis Fair   Assessment Pt is a 76 y/o female seen for OT eval s/p adm to [facility] w/ generalized weakness, decreased oral intake and difficulty w/ ambulation and diarrhea for 1 week  Pt is dx'd w/ COVID-19 and febrile illness  Pt  has a past medical history of Anxiety, Bowel obstruction (HCC), Chronic kidney disease (CKD), stage IV (severe) ([address] ), Chronic thrombosis of subclavian vein ([address] ), Circulation problem, Compression fracture of cervical spine ([address] ), Hernia of abdominal cavity, History of kidney problems, Hydronephrosis, Hypertension, IBS (irritable bowel syndrome), Incontinence, Lung mass, Polycythemia vera ([address] ), Pulmonary embolism ([facility] [address] ) (2014), Shingles, and Urinary tract infection  Pt with active OT orders and up with assistance  orders  Pt lives with her son who has autism in 3 SH, 6 [address]LON, bed/bath 2nd floor  Pt was I w/  ADLS and IADLS, drove, & required use of DME PTA including RW and cane  Pt is currently demonstrating the following occupational deficits: Mod A UB ADLS, Max A LB ADLS, Mod A bed mobility, Mod A EOB sitting; unable to assess transfers/functional mobility at this time  These deficits that are impacting pt's baseline areas of occupation are a result of the following impairments: endurance, activity tolerance, functional mobility, forward functional reach, balance, trunk control, functional standing tolerance, unsupportive home environment, decreased I w/ ADLS/IADLS, strength and decreased insight into deficits  The following Occupational Performance Areas to address include: eating, grooming, bathing/shower, toilet hygiene, dressing, health maintenance, functional mobility, community mobility, clothing management and household maintenance  Recommend STR upon D/C, when medically stable   Pt to continue to benefit from acute immediate OT services to address the following goals 3-5x/week to  w/in 10-14 days:    Goals   Patient Goals to get stronger   LTG Time Frame 10-14   Long Term Goal #1 see below listed goals   Plan   Treatment Interventions ADL retraining;Functional transfer training;UE strengthening/ROM; Endurance training;Cognitive reorientation;Patient/family training;Equipment evaluation/education; Compensatory technique education;Continued evaluation; Energy conservation; Activityengagement   Goal Expiration Date 21   OT Frequency 3-5x/wk   Recommendation   OT Discharge Recommendation Post acute rehabilitation services   OT - OK to Discharge Yes  (when medically stable)   Additional Comments  The patient's raw score on the AM-PAC Daily Activity inpatient short form is 12, standardized score is 30 6, less than 39 4  Patients at this level are likely to benefit from discharge to post-acute rehabilitation services  Please refer to the recommendation of the Occupational Therapist for safe discharge planning     AM-PAC Daily Activity Inpatient   Lower Body Dressing 2   Bathing 1   Toileting 2   Upper Body Dressing 2   Grooming 2   Eating 3   Daily Activity Raw Score 12   Daily Activity Standardized Score (Calc for Raw Score >=11) 30 6   -[facility] Applied Cognition Inpatient   Following a Speech/Presentation 2   Understanding Ordinary Conversation 3   Taking Medications 3   Remembering Where Things Are Placed or Put Away 3   Remembering List of 4-5 Errands 3   Taking Care of Complicated Tasks 2   Applied Cognition Raw Score 16   Applied Cognition Standardized Score 35 03        GOALS    1) Pt will complete rolling left/right in bed with S assist, as prerequisite for further engagement in ADLS   2) Pt will complete supine to sit transfer with S using B/L UEs to initiate bed mobility   3) Pt will tolerate sitting at EOB 20 minutes with S assist and stable vital signs, as prerequisite for further engagement in ADLS   4) Pt will complete grooming task with S assist and increased time to increase independence in functional tasks  5) Pt will increase B/L UE ROM 1/2 MMT to increase functional UB use during ADLS   6) Pt will complete UB ADLS with S and use of AD/DME as needed to increase independence in functional tasks  7) Pt will complete LB ADLS with Min A and use of AD/DME as needed to increase independence in functional tasks  8) Pt will follow 100% simple one step verbal commands and be A/Ox4 consistently t/o use of external environmental cues to increase awareness for functional tasks  9) Pt will demonstrate 100% carryover of E C  techniques t/o fx'l I/ADL/ leisure tasks w/o cues s/p skilled education  10) Pt will be attentive 100% of the time during ongoing formal cognitive assessment to assist w/ safe D/C planning and increase safety during functional activities    ** OTR to assess transfers/functional mobility when appropriate     Brenda Aguilar MS, OTR/L

## 2021-07-16 NOTE — TELEMEDICINE
e-Consult (IPC)  - Interventional Radiology  Natasha Rucker 77 y o  female MRN: [id number]  Unit/Bed#: -01 Encounter: [id number]    IR has been consulted to evaluate the patient, determine the appropriate procedure, and whether or not a procedure can and should be performed regarding the care of Natasha Rucker  We were consulted by internal medicine concerning need for access, CKD5, and to possibly perform a line placement if medically appropriate for the patient  IP Consult to IR  Consult performed by: Ashly Calvo PA-C  Consult ordered by: VICTORINA Garcia        07/16/21      Assessment/Recommendation:     77year old female with pmh of CKD5, fistula, presenting with generalized weakness, diarrhea x1 week, found to be COVID +    - pt with pending blood cultures  - will plan for non tunneled central line       Total time spent in review of data, discussion with requesting provider and rendering advice was 25 minutes       Patient or appropriate family member was verbally informed by internal medicine of this consultative service on their behalf to provide more timely access to specialty care in lieu of an in person consultation  Verbal consent was obtained  Thank you for allowing Interventional Radiology to participate in the care of Natasha Rucker  Please don't hesitate to call or TigerText us with any questions       Ashly Calvo PA-C

## 2021-07-16 NOTE — PLAN OF CARE
Problem: Potential for Falls  Goal: Patient will remain free of falls  Description: INTERVENTIONS:  - Educate patient/family on patient safety including physical limitations  - Instruct patient to call for assistance with activity   - Consult OT/PT to assist with strengthening/mobility   - Keep Call bell within reach  - Keep bed low and locked with side rails adjusted as appropriate  - Keep care items and personal belongings within reach  - Initiate and maintain comfort rounds  - Make Fall Risk Sign visible to staff  - Offer Toileting every 1   Problem: INFECTION - ADULT  Goal: Absence or prevention of progression during hospitalization  Description: INTERVENTIONS:  - Assess and monitor for signs and symptoms of infection  - Monitor lab/diagnostic results  - Monitor all insertion sites, i e  indwelling lines, tubes, and drains  - Monitor endotracheal if appropriate and nasal secretions for changes in amount and color  - [place] appropriate cooling/warming therapies per order  - Administer medications as ordered  - Instruct and encourage patient and family to use good hand hygiene technique  - Identify and instruct in appropriate isolation precautions for identified infection/condition  Outcome: Progressing  Goal: Absence of fever/infection during neutropenic period  Description: INTERVENTIONS:  - Monitor WBC    Outcome: Progressing    Goal: Maintain or return to baseline ADL function  Description: INTERVENTIONS:  -  Assess patient's ability to carry out ADLs; assess patient's baseline for ADL function and identify physical deficits which impact ability to perform ADLs (bathing, care of mouth/teeth, toileting, grooming, dressing, etc )  - Assess/evaluate cause of self-care deficits   - Assess range of motion  - Assess patient's mobility; develop plan if impaired  - Assess patient's need for assistive devices and provide as appropriate  - Encourage maximum independence but intervene and supervise when necessary  - Involve family in performance of ADLs  - Assess for home care needs following discharge   - Consider OT consult to assist with ADL evaluation and planning for discharge  - Provide patient education as appropriate  Outcome: Progressing  Goal: Maintains/Returns to pre admission functional level  Description: INTERVENTIONS:  - Perform BMAT or MOVE assessment daily    - Set and communicate daily mobility goal to care team and patient/family/caregiver  - Collaborate with rehabilitation services on mobility goals if consulted  - Perform Range of Motion 4 times a day  - Reposition patient every 2 hours    - Dangle patient 3 times a day  - Stand patient 3  Problem: SAFETY ADULT  Goal: Patient will remain free of falls  Description: INTERVENTIONS:  - Educate patient/family on patient safety including physical limitations  - Instruct patient to call for assistance with activity   - Consult OT/PT to assist with strengthening/mobility   - Keep Call bell within reach  - Keep bed low and locked with side rails adjusted as appropriate  - Keep care items and personal belongings within reach  - Initiate and maintain comfort rounds  - Make Fall Risk Sign visible to staff  - Offer Toileting every 1 Hours, in advance of need  - Initiate/Maintain alarm  - Obtain necessary fall risk management equipment  - Apply yellow socks and bracelet for high fall risk patients  - Consider moving patient to room near nurses station  Outcome: Progressing  Goal: Maintain or return to baseline ADL function  Description: INTERVENTIONS:  -  Assess patient's ability to carry out ADLs; assess patient's baseline for ADL function and identify physical deficits which impact ability to perform ADLs (bathing, care of mouth/teeth, toileting, grooming, dressing, etc )  - Assess/evaluate cause of self-care deficits   - Assess range of motion  - Assess patient's mobility; develop plan if impaired  - Assess patient's need for assistive devices and provide as appropriate  - Encourage maximum independence but intervene and supervise when necessary  - Involve family in performance of ADLs  - Assess for home care needs following discharge   - Consider OT consult to assist with ADL evaluation and planning for discharge  - Provide patient education as appropriate  Outcome: Progressing    Problem: DISCHARGE PLANNING  Goal: Discharge to home or other facility with appropriate resources  Description: INTERVENTIONS:  - Identify barriers to discharge w/patient and caregiver  - Arrange for needed discharge resources and transportation as appropriate  - Identify discharge learning needs (meds, wound care, etc )  - Arrange for interpretive services to assist at discharge as needed  - Refer to Case Management Department for coordinating discharge planning if the patient needs post-hospital services based on physician/advanced practitioner order or complex needs related to functional status, cognitive ability, or social support system  Outcome: Progressing     Problem: Nutrition/Hydration-ADULT  Goal: Nutrient/Hydration intake appropriate for improving, restoring or maintaining nutritional needs  Description: Monitor and assess patient's nutrition/hydration status for malnutrition  Collaborate with interdisciplinary team and initiate plan and interventions as ordered  Monitor patient's weight and dietary intake as ordered or per policy  Utilize nutrition screening tool and intervene as necessary  Determine patient's food preferences and provide high-protein, high-caloric foods as appropriate       INTERVENTIONS:  - Monitor oral intake, urinary output, labs, and treatment plans  - Assess nutrition and hydration status and recommend course of action  - Evaluate amount of meals eaten  - Assist patient with eating if necessary   - Allow adequate time for meals  - Recommend/ encourage appropriate diets, oral nutritional supplements, and vitamin/mineral supplements  - Order, calculate, and assess calorie counts as needed  - Recommend, monitor, and adjust tube feedings and TPN/PPN based on assessed needs  - Assess need for intravenous fluids  - Provide specific nutrition/hydration education as appropriate  - Include patient/family/caregiver in decisions related to nutrition  Outcome: Progressing    3times a day  - Out of bed to chair 3 times a day   - Out of bed for meals 3times a day  - Out of bed for toileting  - Record patient progress and toleration of activity level   Outcome: Progressing   Hours, in advance of need  - Initiate/Maintain bedalarm  - Obtain necessary fall risk management equipment:   Problem: PAIN - ADULT  Goal: Verbalizes/displays adequate comfort level or baseline comfort level  Description: Interventions:  - Encourage patient to monitor pain and request assistance  - Assess pain using appropriate pain scale  - Administer analgesics based on type and severity of pain and evaluate response  - Implement non-pharmacological measures as appropriate and evaluate response  - Consider cultural and social influences on pain and pain management  - Notify physician/advanced practitioner if interventions unsuccessful or patient reports new pain  Outcome: Progressing     - Apply yellow socks and bracelet for high fall risk patients  - Consider moving patient to room near nurses station  Outcome: Progressing

## 2021-07-16 NOTE — UTILIZATION REVIEW
Initial Clinical Review    Admission: Date/Time/Statement:   Admission Orders (From admission, onward)     Ordered        07/15/21 1412  INPATIENT ADMISSION  Once                   Orders Placed This Encounter   Procedures    INPATIENT ADMISSION     Standing Status:   Standing     Number of Occurrences:   1     Order Specific Question:   Level of Care     Answer:   Med Surg [16]     Order Specific Question:   Estimated length of stay     Answer:   More than 2 Midnights     Order Specific Question:   Certification     Answer:   I certify that inpatient services are medically necessary for this patient for a duration of greater than two midnights  See H&P and MD Progress Notes for additional information about the patient's course of treatment  ED Arrival Information     Expected Arrival Acuity    - 7/15/2021 10:51 Urgent         Means of arrival Escorted by Service Admission type    Ambulance [facility] Card[facility] Urgent         Arrival complaint    fever        Chief Complaint   Patient presents with    Weakness - Generalized     Patient coming in for generalized weakness / lethargy; states that she has been having trouble getting around at home       Initial Presentation: 77 y o  female with hx CKD5, PE, C diff, obstructive uropathy with ileostomy, polycythemia vera  presents to ED from home via EMS complaining of generalized weakness, decreasing oral intake, difficulty with ambulation, and diarrhea for the past 1 week   On exam, normal breath sounds,on RA  dry cough, dry mucous membranes, ileostomy in place  Pt has not been COVID vaccinated  UA - Abnormal  Labs show leukopenia and positive COVID  Also creat elevated from baseline of 3 4-3 5 at 4 09  Pt given IVF in ED  Pt admitted as Inpatient with febrile illness-tested COVID positive vs UTI, infectious diarrhea   Plan- start empiric IV abx,po Vanco, IVF, f/u CXR, f/u cultures and stool studies,consult nephrology, mild COVID path with vitamin cocktail, famotidine  Nephrology-Given metabolic acidosis, start sodium bicarbonate infusion  Consider renal imaging given history of hydronephrosis  Continue empiric IV/PO abx per medicine service  Date: 7/16   Day 2:  CXR shows GGO in upper lungs, could be due to COVID Pneumonia  Unable to obtain labs today- will obtain once central line is placed- CBC, CMP daily, check inflammatory markers  Will start IV remdesivir  Initial procal neg  Will d/c cefepime and  f/u cultures  Consult IR for central line  Possible need for HD discussed with the patient and consent obtained by nephro  Temp 99 7 today  Urine C/S shows >100,000-mixed comtaminaants  C diff neg but will continue Po vanco for 448 hrs post abx  Pt continues to feel tired, weak, nauseated, vomited x1, continues with diarrhea  Pt on RA, denies SOB, no abnormal lung sounds  IR-will plan for non tunneled central line   Nephrology-continue sodium bicarbonate infusion  Hopeful improvement with IVF, will monitor labs once central line placed         ED Triage Vitals [07/15/21 1100]   Temperature Pulse Respirations Blood Pressure SpO2   99 8 °F (37 7 °C) 82 18 130/68 95 %      Temp Source Heart Rate Source Patient Position - Orthostatic VS BP Location FiO2 (%)   Oral Monitor Lying Left arm --      Pain Score       No Pain          Wt Readings from Last 1 Encounters:   07/15/21 82 2 kg (181 lb 3 5 oz)     Additional Vital Signs:   Date/Time  Temp  Pulse  Resp  BP  MAP (mmHg)  SpO2    07/16/21 0850  --  --  --  --  --  --    07/16/21 0700  99 7 °F (37 6 °C)  --  --  --  --  --    07/15/21 1750  --  --  --  --  --  98 %    07/15/21 1541  98 5 °F (36 9 °C)  72  18  135/74  93  98 %    07/15/21 1400  --  72  20  146/68  98  96 %    07/15/21 1200  --  76  20  111/66  82  92 %    07/15/21 1104  --  --  --  --  --  --        Pertinent Labs/Diagnostic Test Results:   7/15  ECG-Normal sinus rhythm  Nonspecific ST abnormality  CXR-Question mild bilateral groundglass opacity in the upper lungs  If this is a real finding, it could be due to Covid-19 pneumonia        Results from last 7 days   Lab Units 07/15/21  1143   SARS-COV-2  Positive*     Results from last 7 days   Lab Units 07/15/21  1152   WBC Thousand/uL 2 92*   HEMOGLOBIN g/dL 9 6*   HEMATOCRIT % 29 9*   PLATELETS Thousands/uL 220   NEUTROS ABS Thousands/µL 2 31         Results from last 7 days   Lab Units 07/15/21  1152   SODIUM mmol/L 135*   POTASSIUM mmol/L 3 6   CHLORIDE mmol/L 110*   CO2 mmol/L 13*   ANION GAP mmol/L 12   BUN mg/dL 44*   CREATININE mg/dL 4 09*   EGFR ml/min/1 73sq m 10   CALCIUM mg/dL 8 7     Results from last 7 days   Lab Units 07/15/21  1152   AST U/L 39   ALT U/L 13   ALK PHOS U/L 56   TOTAL PROTEIN g/dL 7 2   ALBUMIN g/dL 3 0*   TOTAL BILIRUBIN mg/dL 0 48         Results from last 7 days   Lab Units 07/15/21  1152   GLUCOSE RANDOM mg/dL 110           Results from last 7 days   Lab Units 07/15/21  1254   TROPONIN I ng/mL 0 04     Results from last 7 days   Lab Units 07/15/21  2201   D-DIMER QUANTITATIVE ug/ml FEU 0 69*             Results from last 7 days   Lab Units 07/15/21  1152   PROCALCITONIN ng/ml 0 12     Results from last 7 days   Lab Units 07/15/21  1152   LACTIC ACID mmol/L 1 1                 Results from last 7 days   Lab Units 07/15/21  2201   FERRITIN ng/mL 656*             Results from last 7 days   Lab Units 07/15/21  2201   CRP mg/L 66 4*         Results from last 7 days   Lab Units 07/15/21  1149   CLARITY UA  Cloudy   COLOR UA  Yellow   SPEC GRAV UA  1 020   PH UA  8 5*   GLUCOSE UA mg/dl Negative   KETONES UA mg/dl Negative   BLOOD UA  Large*   PROTEIN UA mg/dl >=300*   NITRITE UA  Negative   BILIRUBIN UA  Interference- unable to analyze*   UROBILINOGEN UA E U /dl 0 2   LEUKOCYTES UA  Large*   WBC UA /hpf Innumerable*   RBC UA /hpf 4-10*   BACTERIA UA /hpf Innumerable*   EPITHELIAL CELLS WET PREP /hpf None Seen           Results from last 7 days   Lab Units 07/15/21  1151 07/15/21  1149   BLOOD CULTURE  Received in Microbiology Lab  Culture in Progress  Received in Microbiology Lab  Culture in Progress    --    URINE CULTURE   --  >100,000 cfu/ml                ED Treatment:   Medication Administration from 07/15/2021 1051 to 07/15/2021 1513       Date/Time Order Dose Route Action     07/15/2021 1228 acetaminophen (FOR EMS ONLY) (TYLENOL) oral suspension 650 mg 0 mg Does not apply Given to EMS     07/15/2021 1257 sodium chloride 0 9 % bolus 500 mL 500 mL Intravenous New Bag        Past Medical History:   Diagnosis Date    Anxiety     Bowel obstruction (HCC)     Chronic kidney disease (CKD), stage IV (severe) (HCC)     stage IV    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    Circulation problem     Compression fracture of cervical spine (HCC)     Hernia of abdominal cavity     History of kidney problems     Hydronephrosis     Hypertension     IBS (irritable bowel syndrome)     Incontinence     Lung mass     Improving on PET/CT 1/2016    [facility]cy[facility]a [facility] )     Pulmonary embolism ([facility] [address] ) 2014    Shingles     Urinary tract infection      Present on Admission:   Chronic saddle pulmonary embolism (HCC)   Polycythemia vera (HCC)   Acute renal failure superimposed on stage 5 chronic kidney disease, not on chronic dialysis (HCC)   Obstructive uropathy      Admitting Diagnosis: UTI (urinary tract infection) [N39 0]  Fever [R50 9]  Generalized weakness [R53 1]  Age/Sex: 77 y o  female  Admission Orders:  Scheduled Medications:  apixaban, 2 5 mg, Oral, Daily  ascorbic acid, 1,000 mg, Oral, Q12H LONG  atorvastatin, 40 mg, Oral, Daily With Dinner  calcitriol, 0 25 mcg, Oral, Daily  cefepime, 1,000 mg, Intravenous, Q12H  cholecalciferol, 2,000 Units, Oral, Daily  citalopram, 20 mg, Oral, Daily  famotidine, 20 mg, Oral, Daily  levothyroxine, 75 mcg, Oral, Daily  melatonin, 3 mg, Oral, HS  metoprolol tartrate, 25 mg, Oral, BID  zinc sulfate, 220 mg, Oral, Daily   Followed by  Michelle Patel ON 7/22/2021] multivitamin-minerals, 1 tablet, Oral, Daily  ruxolitinib, 10 mg, Oral, Every Other Day  saccharomyces boulardii, 250 mg, Oral, Daily  vancomycin, 125 mg, Oral, Q6H LONG  ceftriaxone (ROCEPHIN) 1 g/50 mL in dextrose IVPB   Dose: 1,000 mg  Freq: Once Route: IV  Last Dose: Stopped (07/15/21 2200) x1 7/15    Continuous IV Infusions:  sodium bicarbonate infusion, 100 mL/hr, Intravenous, Continuous      PRN Meds:  acetaminophen, 650 mg, Oral, Q4H PRN  loperamide, 2 mg, Oral, 4x Daily PRN  ondansetron, 4 mg, Intravenous, Q4H PRN x1` 7/;16    SCD's  pirometry  Ambulate q shift    IP CONSULT TO NEPHROLOGY  IP CONSULT TO PICC TEAM    Network Utilization Review Department  ATTENTION: Please call with any questions or concerns to [phone] and carefully listen to the prompts so that you are directed to the right person  All voicemails are confidential   Rhett Armando all requests for admission clinical reviews, approved or denied determinations and any other requests to dedicated fax number below belonging to the campus where the patient is receiving treatment   List of dedicated fax numbers for the Facilities:  [address] DENIALS (Administrative/Medical Necessity) [phone]   [address] (Maternity/NICU/Pediatrics) [phone]   [address] [id number] [place] [address] [address] [phone]

## 2021-07-16 NOTE — ASSESSMENT & PLAN NOTE
Lab Results   Component Value Date    EGFR 10 07/15/2021    EGFR 11 07/01/2021    EGFR 13 05/21/2021    CREATININE 4 09 (H) 07/15/2021    CREATININE 3 86 (H) 07/01/2021    CREATININE 3 40 (H) 05/21/2021     · likely prerenal azotemia given poor intake and diarrhea  +/- component of COVID-19  · Cr baseline 3 4-3 8, follows with Dr Jose C Yeboah  · D/w Dr Yanelis Todd- unable to obtain labs today given many unsuccessful attempts plus patient only has a peripheral IV 22g in place   Will place an order for IR to place a central line at this time to obtain labs and administer IVFs- obtain labs after central line placement  · IV fluid for hydration for now until labs available to adjust   · Possible need for HD discussed with the patient and consent obtained by nephro

## 2021-07-16 NOTE — ASSESSMENT & PLAN NOTE
Lab Results   Component Value Date    HGB 9 6 (L) 07/15/2021    HGB 9 9 (L) 07/01/2021    HGB 9 2 (L) 06/17/2021   stable at baseline- c/w monitoring

## 2021-07-16 NOTE — PLAN OF CARE
Problem: OCCUPATIONAL THERAPY ADULT  Goal: Performs self-care activities at highest level of function for planned discharge setting  See evaluation for individualized goals  Description: Treatment Interventions: ADL retraining, Functional transfer training, UE strengthening/ROM, Endurance training, Cognitive reorientation, Patient/family training, Equipment evaluation/education, Compensatory technique education, Continued evaluation, Energy conservation, Activityengagement          See flowsheet documentation for full assessment, interventions and recommendations  Note: Limitation: Decreased ADL status, Decreased UE strength, Decreased Safe judgement during ADL, Decreased sensation, Decreased cognition, Decreased self-care trans, Decreased high-level ADLs  Prognosis: Fair  Assessment: Pt is a 78 y/o female seen for OT eval s/p adm to [facility] w/ generalized weakness, decreased oral intake and difficulty w/ ambulation and diarrhea for 1 week  Pt is dx'd w/ COVID-19 and febrile illness  Pt  has a past medical history of Anxiety, Bowel obstruction (HCC), Chronic kidney disease (CKD), stage IV (severe) ([address] ), Chronic thrombosis of subclavian vein ([address] ), Circulation problem, Compression fracture of cervical spine ([address] ), Hernia of abdominal cavity, History of kidney problems, Hydronephrosis, Hypertension, IBS (irritable bowel syndrome), Incontinence, Lung mass, Polycythemia vera ([address] ), Pulmonary embolism ([address] ) (2014), Shingles, and Urinary tract infection  Pt with active OT orders and up with assistance  orders  Pt lives with her son who has autism in 3 [address], 6 [address], bed/bath 2nd floor  Pt was I w/  ADLS and IADLS, drove, & required use of DME PTA including RW and cane  Pt is currently demonstrating the following occupational deficits: Mod A UB ADLS, Max A LB ADLS, Mod A bed mobility, Mod A EOB sitting; unable to assess transfers/functional mobility at this time   These deficits that are impacting pt's baseline areas of occupation are a result of the following impairments: endurance, activity tolerance, functional mobility, forward functional reach, balance, trunk control, functional standing tolerance, unsupportive home environment, decreased I w/ ADLS/IADLS, strength and decreased insight into deficits  The following Occupational Performance Areas to address include: eating, grooming, bathing/shower, toilet hygiene, dressing, health maintenance, functional mobility, community mobility, clothing management and household maintenance  Recommend STR upon D/C, when medically stable   Pt to continue to benefit from acute immediate OT services to address the following goals 3-5x/week to  w/in 10-14 days:      OT Discharge Recommendation: Post acute rehabilitation services  OT - OK to Discharge: Yes (when medically stable)     Wesley Rogelr MS, OTR/L

## 2021-07-17 LAB
ALBUMIN SERPL BCP-MCNC: 2.4 G/DL (ref 3.5–5)
ALP SERPL-CCNC: 46 U/L (ref 46–116)
ALT SERPL W P-5'-P-CCNC: 10 U/L (ref 12–78)
ANION GAP SERPL CALCULATED.3IONS-SCNC: 8 MMOL/L (ref 4–13)
AST SERPL W P-5'-P-CCNC: 40 U/L (ref 5–45)
BILIRUB SERPL-MCNC: 0.42 MG/DL (ref 0.2–1)
BUN SERPL-MCNC: 40 MG/DL (ref 5–25)
CALCIUM ALBUM COR SERPL-MCNC: 8.9 MG/DL (ref 8.3–10.1)
CALCIUM SERPL-MCNC: 7.6 MG/DL (ref 8.3–10.1)
CHLORIDE SERPL-SCNC: 104 MMOL/L (ref 100–108)
CO2 SERPL-SCNC: 21 MMOL/L (ref 21–32)
CREAT SERPL-MCNC: 3.78 MG/DL (ref 0.6–1.3)
CRP SERPL QL: 80 MG/L
D DIMER PPP FEU-MCNC: 0.64 UG/ML FEU
ERYTHROCYTE [DISTWIDTH] IN BLOOD BY AUTOMATED COUNT: 15.6 % (ref 11.6–15.1)
FERRITIN SERPL-MCNC: 1101 NG/ML (ref 8–388)
GFR SERPL CREATININE-BSD FRML MDRD: 11 ML/MIN/1.73SQ M
GLUCOSE SERPL-MCNC: 141 MG/DL (ref 65–140)
HCT VFR BLD AUTO: 28.2 % (ref 34.8–46.1)
HGB BLD-MCNC: 9.2 G/DL (ref 11.5–15.4)
MCH RBC QN AUTO: 29.6 PG (ref 26.8–34.3)
MCHC RBC AUTO-ENTMCNC: 32.6 G/DL (ref 31.4–37.4)
MCV RBC AUTO: 91 FL (ref 82–98)
PLATELET # BLD AUTO: 227 THOUSANDS/UL (ref 149–390)
PMV BLD AUTO: 10.1 FL (ref 8.9–12.7)
POTASSIUM SERPL-SCNC: 3.2 MMOL/L (ref 3.5–5.3)
PROT SERPL-MCNC: 6.4 G/DL (ref 6.4–8.2)
RBC # BLD AUTO: 3.11 MILLION/UL (ref 3.81–5.12)
SODIUM SERPL-SCNC: 133 MMOL/L (ref 136–145)
WBC # BLD AUTO: 2.92 THOUSAND/UL (ref 4.31–10.16)

## 2021-07-17 PROCEDURE — 82728 ASSAY OF FERRITIN: CPT | Performed by: NURSE PRACTITIONER

## 2021-07-17 PROCEDURE — 85027 COMPLETE CBC AUTOMATED: CPT | Performed by: NURSE PRACTITIONER

## 2021-07-17 PROCEDURE — 80053 COMPREHEN METABOLIC PANEL: CPT | Performed by: NURSE PRACTITIONER

## 2021-07-17 PROCEDURE — 85379 FIBRIN DEGRADATION QUANT: CPT | Performed by: NURSE PRACTITIONER

## 2021-07-17 PROCEDURE — 99232 SBSQ HOSP IP/OBS MODERATE 35: CPT | Performed by: NURSE PRACTITIONER

## 2021-07-17 PROCEDURE — 99232 SBSQ HOSP IP/OBS MODERATE 35: CPT | Performed by: INTERNAL MEDICINE

## 2021-07-17 PROCEDURE — 86140 C-REACTIVE PROTEIN: CPT | Performed by: NURSE PRACTITIONER

## 2021-07-17 PROCEDURE — XW033E5 INTRODUCTION OF REMDESIVIR ANTI-INFECTIVE INTO PERIPHERAL VEIN, PERCUTANEOUS APPROACH, NEW TECHNOLOGY GROUP 5: ICD-10-PCS | Performed by: INTERNAL MEDICINE

## 2021-07-17 RX ORDER — SODIUM CHLORIDE, SODIUM GLUCONATE, SODIUM ACETATE, POTASSIUM CHLORIDE, MAGNESIUM CHLORIDE, SODIUM PHOSPHATE, DIBASIC, AND POTASSIUM PHOSPHATE .53; .5; .37; .037; .03; .012; .00082 G/100ML; G/100ML; G/100ML; G/100ML; G/100ML; G/100ML; G/100ML
75 INJECTION, SOLUTION INTRAVENOUS CONTINUOUS
Status: DISCONTINUED | OUTPATIENT
Start: 2021-07-17 | End: 2021-07-18

## 2021-07-17 RX ADMIN — OXYCODONE HYDROCHLORIDE AND ACETAMINOPHEN 1000 MG: 500 TABLET ORAL at 08:25

## 2021-07-17 RX ADMIN — REMDESIVIR 100 MG: 100 INJECTION, POWDER, LYOPHILIZED, FOR SOLUTION INTRAVENOUS at 14:06

## 2021-07-17 RX ADMIN — ATORVASTATIN CALCIUM 40 MG: 40 TABLET, FILM COATED ORAL at 17:44

## 2021-07-17 RX ADMIN — Medication 250 MG: at 08:28

## 2021-07-17 RX ADMIN — Medication 125 MG: at 05:13

## 2021-07-17 RX ADMIN — ONDANSETRON 4 MG: 2 INJECTION INTRAMUSCULAR; INTRAVENOUS at 08:45

## 2021-07-17 RX ADMIN — ZINC SULFATE 220 MG (50 MG) CAPSULE 220 MG: CAPSULE at 08:26

## 2021-07-17 RX ADMIN — POTASSIUM CHLORIDE 20 MEQ: 14.9 INJECTION, SOLUTION INTRAVENOUS at 01:22

## 2021-07-17 RX ADMIN — SODIUM CHLORIDE, SODIUM GLUCONATE, SODIUM ACETATE, POTASSIUM CHLORIDE, MAGNESIUM CHLORIDE, SODIUM PHOSPHATE, DIBASIC, AND POTASSIUM PHOSPHATE 75 ML/HR: .53; .5; .37; .037; .03; .012; .00082 INJECTION, SOLUTION INTRAVENOUS at 08:26

## 2021-07-17 RX ADMIN — APIXABAN 2.5 MG: 2.5 TABLET, FILM COATED ORAL at 08:27

## 2021-07-17 RX ADMIN — SODIUM CHLORIDE, SODIUM GLUCONATE, SODIUM ACETATE, POTASSIUM CHLORIDE, MAGNESIUM CHLORIDE, SODIUM PHOSPHATE, DIBASIC, AND POTASSIUM PHOSPHATE 75 ML/HR: .53; .5; .37; .037; .03; .012; .00082 INJECTION, SOLUTION INTRAVENOUS at 21:31

## 2021-07-17 RX ADMIN — Medication 2000 UNITS: at 08:25

## 2021-07-17 RX ADMIN — METOPROLOL TARTRATE 25 MG: 25 TABLET, FILM COATED ORAL at 17:44

## 2021-07-17 RX ADMIN — CALCITRIOL CAPSULES 0.25 MCG 0.25 MCG: 0.25 CAPSULE ORAL at 08:28

## 2021-07-17 RX ADMIN — OXYCODONE HYDROCHLORIDE AND ACETAMINOPHEN 1000 MG: 500 TABLET ORAL at 21:31

## 2021-07-17 RX ADMIN — LEVOTHYROXINE SODIUM 75 MCG: 75 TABLET ORAL at 05:13

## 2021-07-17 RX ADMIN — FAMOTIDINE 20 MG: 20 TABLET ORAL at 08:25

## 2021-07-17 RX ADMIN — METOPROLOL TARTRATE 25 MG: 25 TABLET, FILM COATED ORAL at 08:25

## 2021-07-17 RX ADMIN — Medication 125 MG: at 17:44

## 2021-07-17 RX ADMIN — MELATONIN TAB 3 MG 3 MG: 3 TAB at 21:31

## 2021-07-17 RX ADMIN — Medication 125 MG: at 14:06

## 2021-07-17 RX ADMIN — CITALOPRAM HYDROBROMIDE 20 MG: 20 TABLET ORAL at 08:28

## 2021-07-17 NOTE — ASSESSMENT & PLAN NOTE
Lab Results   Component Value Date    EGFR 11 07/17/2021    EGFR 11 07/16/2021    EGFR 10 07/15/2021    CREATININE 3 78 (H) 07/17/2021    CREATININE 3 89 (H) 07/16/2021    CREATININE 4 09 (H) 07/15/2021     · likely prerenal azotemia given poor intake and diarrhea  +/- component of COVID-19  · Cr baseline 3 4-3 8, follows with Dr White Ready  · Creat improving with IV fluids   · Nephrology following

## 2021-07-17 NOTE — ASSESSMENT & PLAN NOTE
Lab Results   Component Value Date    HGB 9 2 (L) 07/17/2021    HGB 9 6 (L) 07/15/2021    HGB 9 9 (L) 07/01/2021   stable at baseline- c/w monitoring

## 2021-07-17 NOTE — ASSESSMENT & PLAN NOTE
Mild pathway:  · presented with fever, generalized weakness, diarrhea decreasing oral intake and difficulty with ambulation, (+) COVID  · Pt is not vaccinated   · Trend labs   · Check cbc and cmp daily   · C/w vitamin D, vitamin C and MVI   · Started on remdesivir yesterday since patient is considered high risk   · Chest xray- Question mild bilateral groundglass opacity in the upper lungs  · procal (-)  · Pt is on room air   · C/w elequis for DVT prophylaxis which she is on for PE tx

## 2021-07-17 NOTE — PROGRESS NOTES
negative  · Was given CTX in the ED and given 2 doses of cefepime  · Given hx of cdiff will c/w oral vancomycin for 48 hrs post abxs- needs 1 more day  · Diarrhea on admission likely secondary to covid dx- now resolved    Anemia in CKD (chronic kidney disease)  Assessment & Plan  Lab Results   Component Value Date    HGB 9 2 (L) 07/17/2021    HGB 9 6 (L) 07/15/2021    HGB 9 9 (L) 07/01/2021   stable at baseline- c/w monitoring    Polycythemia vera ([address] )  Assessment & Plan  Patient follows up with outpatient Hematology Oncology, currently on Jakafi 10 mg Oral Daily    Obstructive uropathy  Assessment & Plan  S/p ileostomy for nonfunctioning bladder and chronic hydro  · OP urology follow up    Chronic saddle pulmonary embolism (HCC)  Assessment & Plan  Continue Eliquis        VTE Pharmacologic Prophylaxis: VTE Score: 8 High Risk (Score >/= 5) - Pharmacological DVT Prophylaxis Ordered: apixaban (Eliquis)  Sequential Compression Devices Ordered  Patient Centered Rounds: I performed bedside rounds with nursing staff today  Discussions with Specialists or Other Care Team Provider: d/w RN     Education and Discussions with Family / Patient: Attempted to update  (son) via phone  Left voicemail  Time Spent for Care: 30 minutes  More than 50% of total time spent on counseling and coordination of care as described above  Current Length of Stay: 2 day(s)  Current Patient Status: Inpatient   Certification Statement: The patient will continue to require additional inpatient hospital stay due to iv fluids   Discharge Plan: Anticipate discharge in >72 hrs to rehab facility  Code Status: Level 1 - Full Code    Subjective:   Pt sitting oob in the chair  Reports feeling tired and weak  Denies SOB  1 epsiode of vomiting today after taking morning meds  No diarrhea  Poor oral intake as pt reports she doesn't have much of an appetite   Denies loss of taste or smell     Objective:     Vitals:   Temp (24hrs), Av 3 °F (36 8 °C), Min:98 3 °F (36 8 °C), Max:98 3 °F (36 8 °C)    Temp:  [98 3 °F (36 8 °C)] 98 3 °F (36 8 °C)  HR:  [] 100  Resp:  [18-19] 18  BP: (116-120)/(74-78) 120/76  SpO2:  [87 %-96 %] 91 %  Body mass index is 35 39 kg/m²  Input and Output Summary (last 24 hours): Intake/Output Summary (Last 24 hours) at 2021 1414  Last data filed at 2021 1300  Gross per 24 hour   Intake 120 ml   Output 400 ml   Net -280 ml       Physical Exam:   Physical Exam  Vitals and nursing note reviewed  Constitutional:       General: She is not in acute distress  Appearance: She is obese  Cardiovascular:      Rate and Rhythm: Normal rate and regular rhythm  Heart sounds: Normal heart sounds  No murmur heard  Pulmonary:      Effort: Pulmonary effort is normal  No respiratory distress  Breath sounds: Normal breath sounds  No wheezing or rales  Abdominal:      General: Bowel sounds are normal  There is no distension  Palpations: Abdomen is soft  Tenderness: There is no abdominal tenderness  Musculoskeletal:         General: No swelling or tenderness  Skin:     General: Skin is warm and dry  Neurological:      Mental Status: She is alert  Mental status is at baseline     Psychiatric:      Comments: Flat/frustrated          Additional Data:     Labs:  Results from last 7 days   Lab Units 21  0529 07/15/21  1152   WBC Thousand/uL 2 92* 2 92*   HEMOGLOBIN g/dL 9 2* 9 6*   HEMATOCRIT % 28 2* 29 9*   PLATELETS Thousands/uL 227 220   NEUTROS PCT %  --  79*   LYMPHS PCT %  --  12*   MONOS PCT %  --  8   EOS PCT %  --  0     Results from last 7 days   Lab Units 21  0529   SODIUM mmol/L 133*   POTASSIUM mmol/L 3 2*   CHLORIDE mmol/L 104   CO2 mmol/L 21   BUN mg/dL 40*   CREATININE mg/dL 3 78*   ANION GAP mmol/L 8   CALCIUM mg/dL 7 6*   ALBUMIN g/dL 2 4*   TOTAL BILIRUBIN mg/dL 0 42   ALK PHOS U/L 46   ALT U/L 10*   AST U/L 40   GLUCOSE RANDOM mg/dL 141* Results from last 7 days   Lab Units 07/15/21  1152   LACTIC ACID mmol/L 1 1   PROCALCITONIN ng/ml 0 12       Lines/Drains:  Invasive Devices     Central Venous Catheter Line            CVC Central Lines 07/16/21 Triple Right Other (Comment) <1 day          Line            Hemodialysis AV Fistula Right Upper arm -- days          Drain            Urostomy Ileal conduit RUQ 1746 days    Nephrostomy Left 2 8 Fr  37 days    Percutaneous Nephroureteral Tube (PCNU) Left 1 10 Fr 40 Cm 37 days                Central Line:  Goal for removal: Will discontinue when meds requiring line are completed  Will discontinue when peripheral access obtained  Imaging: Reviewed radiology reports from this admission including: chest xray    Recent Cultures (last 7 days):   Results from last 7 days   Lab Units 07/15/21  2201 07/15/21  1151 07/15/21  1149   BLOOD CULTURE   --  No Growth at 24 hrs    No Growth at 24 hrs   --    URINE CULTURE   --   --  >100,000 cfu/ml    C DIFF TOXIN B BY PCR  Negative  --   --        Last 24 Hours Medication List:   Current Facility-Administered Medications   Medication Dose Route Frequency Provider Last Rate    acetaminophen  650 mg Oral Q4H PRN Clara Simon, DO      apixaban  2 5 mg Oral Daily Clara Simon, DO      ascorbic acid  1,000 mg Oral Q12H [address] Clara Simon, DO      atorvastatin  40 mg Oral Daily With [employer], DO      calcitriol  0 25 mcg Oral Daily Clara Simon, DO      cholecalciferol  2,000 Units Oral Daily Clara Simon, DO      citalopram  20 mg Oral Daily Clara Simon, DO      famotidine  20 mg Oral Daily Clara Simon, DO      levothyroxine  75 mcg Oral Daily Clara Simon, DO      loperamide  2 mg Oral 4x Daily PRN Clara Simon, DO      melatonin  3 mg Oral HS Clara Simon, DO      metoprolol tartrate  25 mg Oral BID Clara Simon, DO      multi-electrolyte  75 mL/hr Intravenous Continuous Yanely Angelica Swift, DO 75 mL/hr (07/17/21 0303)    zinc sulfate 220 mg Oral Daily Leopoldo Conquest, DO      Followed by   Wong Abdalla ON 7/22/2021] multivitamin-minerals  1 tablet Oral Daily Leopoldo Conquest, DO      ondansetron  4 mg Intravenous Q4H PRN Leopoldo Conquest, DO      remdesivir  100 mg Intravenous Q24H VICTORINA Chavez      ruxolitinib  10 mg Oral Every Other Day Leopoldo Conquest, DO      saccharomyces boulardii  250 mg Oral Daily Leopoldo Conquest, DO      vancomycin  125 mg Oral Q6H [address] Leopoldo Conquest, DO          Today, Patient Was Seen By: VICTORINA Chavez    **Please Note: This note may have been constructed using a voice recognition system  **

## 2021-07-17 NOTE — PROGRESS NOTES
NEPHROLOGY PROGRESS NOTE   Raeford Schilder 77 y o  female MRN: [id number]  Unit/Bed#: -01 Encounter: [id number]  Reason for Consult:  Acute kidney injury    Assessment/Plan:  1  Acute on chronic kidney disease (POA), suspecting likely prerenal azotemia given poor intake and diarrhea  +/- component of COVID-19  2  Chronic kidney disease stage 5  Baseline creatinine mid threes most recent outpatient creatinine 3 86 estimated GFR 11  3  Severe metabolic acidosis, improved, continue with balanced IV solution  4  Solitary kidney function with advanced left renal atrophy  5  Left-sided hydronephrosis with recent history of left-sided nephrostomy tube  6  COVID-19 infection, as per primary service  7  Diarrhea with history of C diff colitis, currently on empiric oral vancomycin  8  History of pulmonary embolism currently Eliquis  9  Polycythemia vera, following with Hematology Oncology, currently Jakafi  10  Anemia of chronic disease, continue monitor closely, currently 9 2     PLAN:  · Overall renal function has improved  · Continue with IV fluids given poor oral intake  · Replace potassium  · No other changes in current regimen  · Volume status appears stable    SUBJECTIVE:  Overall she looks slightly improved from yesterday  No reports of chest pain or shortness of breath  Appetite still remains significantly poor  Diarrhea appears to be improving  Review of Systems    OBJECTIVE:  Current Weight: Weight - Scale: 82 2 kg (181 lb 3 5 oz)  Vitals:    07/17/21 0800 07/17/21 0815 07/17/21 0830 07/17/21 0845   BP:       BP Location:       Pulse: 78 80 80 100   Resp:       Temp:       TempSrc:       SpO2: (!) 87% (!) 88% 91% 91%   Weight:       Height:           Intake/Output Summary (Last 24 hours) at 7/17/2021 1308  Last data filed at 7/17/2021 0845  Gross per 24 hour   Intake 0 ml   Output 400 ml   Net -400 ml       Physical Exam  Constitutional:       Appearance: She is not ill-appearing     Eyes: General: No scleral icterus  Cardiovascular:      Rate and Rhythm: Normal rate and regular rhythm  Pulmonary:      Effort: Pulmonary effort is normal       Breath sounds: Normal breath sounds  Abdominal:      General: There is distension  Palpations: Abdomen is soft  Tenderness: There is no abdominal tenderness  Musculoskeletal:      Right lower leg: No edema  Left lower leg: No edema  Skin:     General: Skin is warm and dry  Neurological:      Mental Status: She is alert and oriented to person, place, and time           Medications:    Current Facility-Administered Medications:     acetaminophen (TYLENOL) tablet 650 mg, 650 mg, Oral, Q4H PRN, Nam Aliment, DO    apixaban (ELIQUIS) tablet 2 5 mg, 2 5 mg, Oral, Daily, Nam Aliment, DO, 2 5 mg at 07/17/21 0827    ascorbic acid (VITAMIN C) tablet 1,000 mg, 1,000 mg, Oral, Q12H [address], Nam Aliment, DO, 1,000 mg at 07/17/21 0825    atorvastatin (LIPITOR) tablet 40 mg, 40 mg, Oral, Daily With Fernanda Zuñiga, DO, 40 mg at 07/16/21 1753    calcitriol (ROCALTROL) capsule 0 25 mcg, 0 25 mcg, Oral, Daily, Nam Aliment, DO, 0 25 mcg at 07/17/21 1821    cholecalciferol (VITAMIN D3) tablet 2,000 Units, 2,000 Units, Oral, Daily, Nam Aliment, DO, 2,000 Units at 07/17/21 0825    citalopram (CeleXA) tablet 20 mg, 20 mg, Oral, Daily, Nam Aliment, DO, 20 mg at 07/17/21 0828    famotidine (PEPCID) tablet 20 mg, 20 mg, Oral, Daily, Nam Aliment, DO, 20 mg at 07/17/21 0825    levothyroxine tablet 75 mcg, 75 mcg, Oral, Daily, Nam Aliment, DO, 75 mcg at 07/17/21 0513    loperamide (IMODIUM) capsule 2 mg, 2 mg, Oral, 4x Daily PRN, Nam Aliment, DO, 2 mg at 07/16/21 1803    melatonin tablet 3 mg, 3 mg, Oral, HS, Nam Aliment, DO, 3 mg at 07/16/21 2123    metoprolol tartrate (LOPRESSOR) tablet 25 mg, 25 mg, Oral, BID, Nam Aliment, DO, 25 mg at 07/17/21 0825    multi-electrolyte (PLASMALYTE-A/ISOLYTE-S PH 7 4) IV solution, 75 mL/hr, Intravenous, Continuous, Ronit Swift DO, Last Rate: 75 mL/hr at 07/17/21 0826, 75 mL/hr at 07/17/21 6930    zinc sulfate (ZINCATE) capsule 220 mg, 220 mg, Oral, Daily, 220 mg at 07/17/21 0826 **FOLLOWED BY** [START ON 7/22/2021] multivitamin-minerals (CENTRUM ADULTS) tablet 1 tablet, 1 tablet, Oral, Daily, Noemí Carter DO    ondansetron [facility]) injection 4 mg, 4 mg, Intravenous, Q4H PRN, Noemí Carter DO, 4 mg at 07/17/21 0845    [COMPLETED] remdesivir (Veklury) 200 mg in sodium chloride 0 9 % 250 mL IVPB, 200 mg, Intravenous, Q24H, Held at 07/16/21 1820 **FOLLOWED BY** remdesivir (Veklury) 100 mg in sodium chloride 0 9 % 250 mL IVPB, 100 mg, Intravenous, Q24H, VICTORINA Gil    ruxolitinib (JAKAFI) tablet 10 mg, 10 mg, Oral, Every Other Day, Noemí Carter DO    saccharomyces boulardii (FLORASTOR) capsule 250 mg, 250 mg, Oral, Daily, Noemí Carter DO, 250 mg at 07/17/21 0828    vancomycin (VANCOCIN) oral solution 125 mg, 125 mg, Oral, Q6H [facility], Noemí Carter DO, 125 mg at 07/17/21 0513    Laboratory Results:  Results from last 7 days   Lab Units 07/17/21  0529 07/16/21  2133 07/15/21  1152   WBC Thousand/uL 2 92*  --  2 92*   HEMOGLOBIN g/dL 9 2*  --  9 6*   HEMATOCRIT % 28 2*  --  29 9*   PLATELETS Thousands/uL 227  --  220   POTASSIUM mmol/L 3 2* 3 3* 3 6   CHLORIDE mmol/L 104 107 110*   CO2 mmol/L 21 15* 13*   BUN mg/dL 40* 43* 44*   CREATININE mg/dL 3 78* 3 89* 4 09*   CALCIUM mg/dL 7 6* 8 0* 8 7

## 2021-07-17 NOTE — ASSESSMENT & PLAN NOTE
· In the setting of COVID infection- refer to [facility] for plan   · cdiff culture negative   · Urine culture- mixed contaminant x4  · Follow blood cultures- ngtd  · Chest xray- Question mild bilateral groundglass opacity in the upper lungs

## 2021-07-17 NOTE — ASSESSMENT & PLAN NOTE
· cdiff culture negative  · Was given CTX in the ED and given 2 doses of cefepime     · Given hx of cdiff will c/w oral vancomycin for 48 hrs post abxs- needs 1 more day  · Diarrhea on admission likely secondary to covid dx- now resolved

## 2021-07-18 PROBLEM — E87.2 METABOLIC ACIDOSIS: Status: RESOLVED | Noted: 2021-07-16 | Resolved: 2021-07-18

## 2021-07-18 PROBLEM — E87.6 HYPOKALEMIA: Status: ACTIVE | Noted: 2021-07-18

## 2021-07-18 PROBLEM — E87.20 METABOLIC ACIDOSIS: Status: RESOLVED | Noted: 2021-07-16 | Resolved: 2021-07-18

## 2021-07-18 LAB
ALBUMIN SERPL BCP-MCNC: 2.2 G/DL (ref 3.5–5)
ALP SERPL-CCNC: 42 U/L (ref 46–116)
ALT SERPL W P-5'-P-CCNC: 9 U/L (ref 12–78)
ANION GAP SERPL CALCULATED.3IONS-SCNC: 11 MMOL/L (ref 4–13)
AST SERPL W P-5'-P-CCNC: 41 U/L (ref 5–45)
BILIRUB SERPL-MCNC: 0.48 MG/DL (ref 0.2–1)
BUN SERPL-MCNC: 34 MG/DL (ref 5–25)
CALCIUM ALBUM COR SERPL-MCNC: 8.9 MG/DL (ref 8.3–10.1)
CALCIUM SERPL-MCNC: 7.5 MG/DL (ref 8.3–10.1)
CHLORIDE SERPL-SCNC: 108 MMOL/L (ref 100–108)
CO2 SERPL-SCNC: 20 MMOL/L (ref 21–32)
CREAT SERPL-MCNC: 3.58 MG/DL (ref 0.6–1.3)
ERYTHROCYTE [DISTWIDTH] IN BLOOD BY AUTOMATED COUNT: 15.8 % (ref 11.6–15.1)
GFR SERPL CREATININE-BSD FRML MDRD: 12 ML/MIN/1.73SQ M
GLUCOSE SERPL-MCNC: 100 MG/DL (ref 65–140)
HCT VFR BLD AUTO: 27.3 % (ref 34.8–46.1)
HGB BLD-MCNC: 8.7 G/DL (ref 11.5–15.4)
MCH RBC QN AUTO: 29.1 PG (ref 26.8–34.3)
MCHC RBC AUTO-ENTMCNC: 31.9 G/DL (ref 31.4–37.4)
MCV RBC AUTO: 91 FL (ref 82–98)
PHOSPHATE SERPL-MCNC: 2.8 MG/DL (ref 2.3–4.1)
PLATELET # BLD AUTO: 216 THOUSANDS/UL (ref 149–390)
PMV BLD AUTO: 10.1 FL (ref 8.9–12.7)
POTASSIUM SERPL-SCNC: 3.1 MMOL/L (ref 3.5–5.3)
PROT SERPL-MCNC: 6 G/DL (ref 6.4–8.2)
RBC # BLD AUTO: 2.99 MILLION/UL (ref 3.81–5.12)
SODIUM SERPL-SCNC: 139 MMOL/L (ref 136–145)
WBC # BLD AUTO: 2.68 THOUSAND/UL (ref 4.31–10.16)

## 2021-07-18 PROCEDURE — 80053 COMPREHEN METABOLIC PANEL: CPT | Performed by: NURSE PRACTITIONER

## 2021-07-18 PROCEDURE — 84100 ASSAY OF PHOSPHORUS: CPT | Performed by: INTERNAL MEDICINE

## 2021-07-18 PROCEDURE — 99232 SBSQ HOSP IP/OBS MODERATE 35: CPT | Performed by: NURSE PRACTITIONER

## 2021-07-18 PROCEDURE — 85027 COMPLETE CBC AUTOMATED: CPT | Performed by: NURSE PRACTITIONER

## 2021-07-18 PROCEDURE — 99233 SBSQ HOSP IP/OBS HIGH 50: CPT | Performed by: INTERNAL MEDICINE

## 2021-07-18 RX ORDER — SACCHAROMYCES BOULARDII 250 MG
250 CAPSULE ORAL 2 TIMES DAILY
Status: DISCONTINUED | OUTPATIENT
Start: 2021-07-18 | End: 2021-08-03 | Stop reason: HOSPADM

## 2021-07-18 RX ORDER — SODIUM BICARBONATE 650 MG/1
1300 TABLET ORAL
Status: DISCONTINUED | OUTPATIENT
Start: 2021-07-18 | End: 2021-08-02

## 2021-07-18 RX ORDER — FUROSEMIDE 10 MG/ML
40 INJECTION INTRAMUSCULAR; INTRAVENOUS ONCE
Status: DISCONTINUED | OUTPATIENT
Start: 2021-07-18 | End: 2021-07-18

## 2021-07-18 RX ORDER — POTASSIUM CHLORIDE 20 MEQ/1
40 TABLET, EXTENDED RELEASE ORAL ONCE
Status: COMPLETED | OUTPATIENT
Start: 2021-07-18 | End: 2021-07-18

## 2021-07-18 RX ORDER — FUROSEMIDE 10 MG/ML
20 INJECTION INTRAMUSCULAR; INTRAVENOUS ONCE
Status: COMPLETED | OUTPATIENT
Start: 2021-07-18 | End: 2021-07-18

## 2021-07-18 RX ADMIN — LEVOTHYROXINE SODIUM 75 MCG: 75 TABLET ORAL at 05:00

## 2021-07-18 RX ADMIN — REMDESIVIR 100 MG: 100 INJECTION, POWDER, LYOPHILIZED, FOR SOLUTION INTRAVENOUS at 13:05

## 2021-07-18 RX ADMIN — OXYCODONE HYDROCHLORIDE AND ACETAMINOPHEN 1000 MG: 500 TABLET ORAL at 21:48

## 2021-07-18 RX ADMIN — METOPROLOL TARTRATE 25 MG: 25 TABLET, FILM COATED ORAL at 17:28

## 2021-07-18 RX ADMIN — Medication 125 MG: at 00:35

## 2021-07-18 RX ADMIN — OXYCODONE HYDROCHLORIDE AND ACETAMINOPHEN 1000 MG: 500 TABLET ORAL at 08:16

## 2021-07-18 RX ADMIN — Medication 125 MG: at 17:28

## 2021-07-18 RX ADMIN — RUXOLITINIB 10 MG: 10 TABLET ORAL at 08:22

## 2021-07-18 RX ADMIN — FUROSEMIDE 20 MG: 10 INJECTION, SOLUTION INTRAVENOUS at 13:04

## 2021-07-18 RX ADMIN — POTASSIUM CHLORIDE 40 MEQ: 1500 TABLET, EXTENDED RELEASE ORAL at 13:05

## 2021-07-18 RX ADMIN — SODIUM BICARBONATE 650 MG TABLET 1300 MG: at 17:31

## 2021-07-18 RX ADMIN — APIXABAN 2.5 MG: 2.5 TABLET, FILM COATED ORAL at 08:16

## 2021-07-18 RX ADMIN — Medication 125 MG: at 05:00

## 2021-07-18 RX ADMIN — METOPROLOL TARTRATE 25 MG: 25 TABLET, FILM COATED ORAL at 08:16

## 2021-07-18 RX ADMIN — Medication 250 MG: at 08:16

## 2021-07-18 RX ADMIN — ATORVASTATIN CALCIUM 40 MG: 40 TABLET, FILM COATED ORAL at 17:28

## 2021-07-18 RX ADMIN — ZINC SULFATE 220 MG (50 MG) CAPSULE 220 MG: CAPSULE at 08:16

## 2021-07-18 RX ADMIN — FAMOTIDINE 20 MG: 20 TABLET ORAL at 08:16

## 2021-07-18 RX ADMIN — CALCITRIOL CAPSULES 0.25 MCG 0.25 MCG: 0.25 CAPSULE ORAL at 08:16

## 2021-07-18 RX ADMIN — Medication 2000 UNITS: at 08:16

## 2021-07-18 RX ADMIN — Medication 250 MG: at 17:31

## 2021-07-18 RX ADMIN — Medication 125 MG: at 13:05

## 2021-07-18 RX ADMIN — CITALOPRAM HYDROBROMIDE 20 MG: 20 TABLET ORAL at 08:16

## 2021-07-18 RX ADMIN — MELATONIN TAB 3 MG 3 MG: 3 TAB at 21:48

## 2021-07-18 NOTE — PROGRESS NOTES
NEPHROLOGY PROGRESS NOTE   Kodak Philip 77 y o  female MRN: [id number]  Unit/Bed#: -01 Encounter: [id number]  Reason for Consult:  Acute on chronic kidney disease    Assessment/Plan:  1  Acute on chronic kidney disease (POA), suspecting likely prerenal azotemia given poor intake and diarrhea  +/- component of COVID-19  2  Chronic kidney disease stage 5  Baseline creatinine mid threes most recent outpatient creatinine 3 86 estimated GFR 11  3  Severe metabolic acidosis, improved, resume oral sodium bicarbonate therapy   4  CKD associated mineral bone disorder, phosphorus stable, continue with calcitriol  5  Hypokalemia, continue with replacement as needed  6  Solitary kidney function with advanced left renal atrophy  7  Left-sided hydronephrosis with recent history of left-sided nephrostomy tube  8  COVID-19 infection, as per primary service, currently on remdesivir  9  Diarrhea with history of C diff colitis, currently on empiric oral vancomycin, C diff negative  10  History of pulmonary embolism currently Eliquis  11  Polycythemia vera, following with Hematology Oncology, currently Jakafi  12  Anemia of chronic disease, continue monitor closely, currently 9 2      PLAN:  · Overall renal function remains fairly stable current creatinine 3 58  · Concern for possible volume overload patient was given 20 mg IV Lasix x1  · Will hold on further IV fluids  · Resume oral sodium bicarbonate therapy  · Lasix as needed for now  · Given advanced CKD, discussed previously further risk of require hemodialysis  Consent obtained, placed on chart  SUBJECTIVE:  Seen and examined  Patient appears more awake alert today  Unfortunately with persistent diarrhea  Appetite remains poor  Discussed with primary service apparently increasing oxygen requirements  Given Lasix 20 mg IV x1      Review of Systems    OBJECTIVE:  Current Weight: Weight - Scale: 82 2 kg (181 lb 3 5 oz)  Vitals:    07/17/21 0830 07/17/21 0845 07/17/21 2135 07/18/21 0807   BP:   146/75 118/63   BP Location:   Left arm    Pulse: 80 100 76 78   Resp:   18    Temp:   98 6 °F (37 °C) 98 °F (36 7 °C)   TempSrc:   Oral    SpO2: 91% 91% 94% 91%   Weight:       Height:           Intake/Output Summary (Last 24 hours) at 7/18/2021 1548  Last data filed at 7/18/2021 0900  Gross per 24 hour   Intake 120 ml   Output 850 ml   Net -730 ml       Physical Exam  Constitutional:       Appearance: She is obese  She is not ill-appearing  Eyes:      General: No scleral icterus  Cardiovascular:      Rate and Rhythm: Normal rate and regular rhythm  Pulmonary:      Effort: Pulmonary effort is normal       Breath sounds: Rales (Few rales bilaterally) present  Abdominal:      General: There is no distension  Palpations: Abdomen is soft  Musculoskeletal:      Right lower leg: No edema  Left lower leg: No edema  Comments: Noted upper extremity edema   Skin:     General: Skin is warm and dry  Findings: No rash  Neurological:      Mental Status: She is alert and oriented to person, place, and time           Medications:    Current Facility-Administered Medications:     acetaminophen (TYLENOL) tablet 650 mg, 650 mg, Oral, Q4H PRN, Lenny Rubio DO    apixaban [facility]) tablet 2 5 mg, 2 5 mg, Oral, Daily, Lenny Rubio DO, 2 5 mg at 07/18/21 0816    ascorbic acid (VITAMIN C) tablet 1,000 mg, 1,000 mg, Oral, Q12H [facility], Lenny Rubio DO, 1,000 mg at 07/18/21 0816    atorvastatin (LIPITOR) tablet 40 mg, 40 mg, Oral, Daily With Graylin Rola, DO, 40 mg at 07/17/21 1744    calcitriol (ROCALTROL) capsule 0 25 mcg, 0 25 mcg, Oral, Daily, Lenny Rubio DO, 0 25 mcg at 07/18/21 0816    cholecalciferol (VITAMIN D3) tablet 2,000 Units, 2,000 Units, Oral, Daily, Lenny Rubio DO, 2,000 Units at 07/18/21 0816    citalopram (CeleXA) tablet 20 mg, 20 mg, Oral, Daily, Lenny Rubio DO, 20 mg at 07/18/21 0816    famotidine (PEPCID) tablet 20 mg, 20 mg, Oral, Daily, Gregory Monson DO, 20 mg at 07/18/21 0816    levothyroxine tablet 75 mcg, 75 mcg, Oral, Daily, Gregory Monson DO, 75 mcg at 07/18/21 0500    melatonin tablet 3 mg, 3 mg, Oral, HS, Gregory Monson DO, 3 mg at 07/17/21 2131    metoprolol tartrate (LOPRESSOR) tablet 25 mg, 25 mg, Oral, BID, Gregory Monson DO, 25 mg at 07/18/21 0816    zinc sulfate (ZINCATE) capsule 220 mg, 220 mg, Oral, Daily, 220 mg at 07/18/21 0816 **FOLLOWED BY** [START ON 7/22/2021] multivitamin-minerals (CENTRUM ADULTS) tablet 1 tablet, 1 tablet, Oral, Daily, Gregory Monson DO    ondansetron [facility]) injection 4 mg, 4 mg, Intravenous, Q4H PRN, Gregory Monson DO, 4 mg at 07/17/21 0845    [COMPLETED] remdesivir (Veklury) 200 mg in sodium chloride 0 9 % 250 mL IVPB, 200 mg, Intravenous, Q24H, Held at 07/16/21 1820 **FOLLOWED BY** remdesivir (Veklury) 100 mg in sodium chloride 0 9 % 250 mL IVPB, 100 mg, Intravenous, Q24H, VICTORINA Gil, 100 mg at 07/18/21 1305    ruxolitinib (JAKAFI) tablet 10 mg, 10 mg, Oral, Every Other Day, Gregory Monson DO, 10 mg at 07/18/21 0849    saccharomyces boulardii (FLORASTOR) capsule 250 mg, 250 mg, Oral, BID, VICTORINA iGl    vancomycin (VANCOCIN) oral solution 125 mg, 125 mg, Oral, Q6H [address], Gregory Monson DO, 125 mg at 07/18/21 1305    Laboratory Results:  Results from last 7 days   Lab Units 07/18/21  0459 07/17/21  0529 07/16/21  2133 07/15/21  1152   WBC Thousand/uL 2 68* 2 92*  --  2 92*   HEMOGLOBIN g/dL 8 7* 9 2*  --  9 6*   HEMATOCRIT % 27 3* 28 2*  --  29 9*   PLATELETS Thousands/uL 216 227  --  220   POTASSIUM mmol/L 3 1* 3 2* 3 3* 3 6   CHLORIDE mmol/L 108 104 107 110*   CO2 mmol/L 20* 21 15* 13*   BUN mg/dL 34* 40* 43* 44*   CREATININE mg/dL 3 58* 3 78* 3 89* 4 09*   CALCIUM mg/dL 7 5* 7 6* 8 0* 8 7   PHOSPHORUS mg/dL 2 8  --   --   --

## 2021-07-18 NOTE — ASSESSMENT & PLAN NOTE
· cdiff culture negative but patient is persisting with large amounts of diarrhea- will recheck cdiff culture again today given history of cdiff and pt did receive IV CTX and cefepime during her stay   · Also check enteric stool panel   · If above is negative this is likely secondary to COVID and would recommend imodium to assist with diarrhea at that time   · C/w PO vanco until repeat cdiff culture results

## 2021-07-18 NOTE — ASSESSMENT & PLAN NOTE
Lab Results   Component Value Date    HGB 8 7 (L) 07/18/2021    HGB 9 2 (L) 07/17/2021    HGB 9 6 (L) 07/15/2021   stable at baseline- c/w monitoring

## 2021-07-18 NOTE — ASSESSMENT & PLAN NOTE
Lab Results   Component Value Date    EGFR 12 07/18/2021    EGFR 11 07/17/2021    EGFR 11 07/16/2021    CREATININE 3 58 (H) 07/18/2021    CREATININE 3 78 (H) 07/17/2021    CREATININE 3 89 (H) 07/16/2021     · likely prerenal azotemia given poor intake and diarrhea  +/- component of COVID-19  · Cr baseline 3 4-3 8, follows with Dr Ladan Anderson  · Creat improving with IV fluids but now appears to be volume overloaded with rales on exam and now requiring o2- d/w Dr Grabiel Mar, will give one time dose of lasix 20mg IV now and hold IV fluids    · Nephrology following

## 2021-07-18 NOTE — ASSESSMENT & PLAN NOTE
Mild pathway:  · presented with fever, generalized weakness, diarrhea decreasing oral intake and difficulty with ambulation, (+) COVID  · Pt is not vaccinated   · Trend labs   · Check cbc and cmp daily   · C/w vitamin D, vitamin C and MVI   · Started on remdesivir day #3/5 since patient is considered high risk   · Chest xray- Question mild bilateral groundglass opacity in the upper lungs  · procal (-)   · Pt is now requiring o2 but likely secondary to volume overload- monitor   · C/w elequis for DVT prophylaxis which she is on for PE tx

## 2021-07-18 NOTE — PROGRESS NOTES
[address] [facility]  Progress Note - Laura Rivera [id number], 77 y o  female MRN: [id number]  Unit/Bed#: -01 Encounter: [id number]  Primary Care Provider: Richard Campbell MD   Date and time admitted to hospital: 7/15/2021 10:52 AM    COVID-19 virus infection  Assessment & Plan  Mild pathway:  · presented with fever, generalized weakness, diarrhea decreasing oral intake and difficulty with ambulation, (+) COVID  · Pt is not vaccinated   · Trend labs   · Check cbc and cmp daily   · C/w vitamin D, vitamin C and MVI   · Started on remdesivir day #3/5 since patient is considered high risk   · Chest xray- Question mild bilateral groundglass opacity in the upper lungs  · procal (-)   · Pt is now requiring o2 but likely secondary to volume overload- monitor   · C/w elequis for DVT prophylaxis which she is on for PE tx        * Febrile illness  Assessment & Plan  · In the setting of COVID infection- refer to [facility] for plan   · cdiff culture negative  · Urine culture- mixed contaminant x4  · Follow blood cultures- ngtd  · Chest xray- Question mild bilateral groundglass opacity in the upper lungs    Acute renal failure superimposed on stage 5 chronic kidney disease, not on chronic dialysis [facility])  Assessment & Plan  Lab Results   Component Value Date    EGFR 12 07/18/2021    EGFR 11 07/17/2021    EGFR 11 07/16/2021    CREATININE 3 58 (H) 07/18/2021    CREATININE 3 78 (H) 07/17/2021    CREATININE 3 89 (H) 07/16/2021     · likely prerenal azotemia given poor intake and diarrhea  +/- component of COVID-19  · Cr baseline 3 4-3 8, follows with Dr Ladan Anderson  · Creat improving with IV fluids but now appears to be volume overloaded with rales on exam and now requiring o2- d/w Dr Grabiel Mar, will give one time dose of lasix 20mg IV now and hold IV fluids    · Nephrology following     Hypokalemia  Assessment & Plan  replete    Class 2 severe obesity due to excess calories with serious comorbidity and body mass index (BMI) of 35 0 to 35 9 in adult [facility])  Assessment & Plan  · BMI 35 39- noted     History of Clostridioides difficile colitis  Assessment & Plan  · cdiff culture negative but patient is persisting with large amounts of diarrhea- will recheck cdiff culture again today given history of cdiff and pt did receive IV CTX and cefepime during her stay   · Also check enteric stool panel   · If above is negative this is likely secondary to [place] and would recommend imodium to assist with diarrhea at that time   · C/w PO vanco until repeat cdiff culture results    Anemia in CKD (chronic kidney disease)  Assessment & Plan  Lab Results   Component Value Date    HGB 8 7 (L) 07/18/2021    HGB 9 2 (L) 07/17/2021    HGB 9 6 (L) 07/15/2021   stable at baseline- c/w monitoring    Polycythemia vera ([facility] [address] )  Assessment & Plan  Patient follows up with outpatient Hematology Oncology, currently on Jakafi 10 mg Oral Daily    Obstructive uropathy  Assessment & Plan  S/p ileostomy for nonfunctioning bladder and chronic hydro  · OP urology follow up    Chronic saddle pulmonary embolism ([facility] [address] )  Assessment & Plan  Continue Eliquis    Metabolic acidosis-resolved as of 7/18/2021  Assessment & Plan  · Nephrology following  · resolved      VTE Pharmacologic Prophylaxis: VTE Score: 8 High Risk (Score >/= 5) - Pharmacological DVT Prophylaxis Ordered: apixaban (Eliquis)  Sequential Compression Devices Ordered  Patient Centered Rounds: I performed bedside rounds with nursing staff today  Discussions with Specialists or Other Care Team Provider: d/w RN d/w Dr Allie Armendariz     Education and Discussions with Family / Patient: Updated  (son) via phone  Time Spent for Care: 30 minutes  More than 50% of total time spent on counseling and coordination of care as described above      Current Length of Stay: 3 day(s)  Current Patient Status: Inpatient   Certification Statement: The patient will continue to require additional inpatient hospital stay due to LUANN, COVID   Discharge Plan: Anticipate discharge in >72 hrs to rehab facility  Code Status: Level 1 - Full Code    Subjective:   Pt reports overall she is feeling much better and when asked what is feeling better she reports her overall spirit  She denies any pain or sob  She is now requiring oxygen and when asked why she reports she doesn't know and that the RN told her her numbers are low  She reports she is still having diarrhea and per RN reports she is having large amounts of diarrhea  No n/v or abd pain     Objective:     Vitals:   Temp (24hrs), Av 3 °F (36 8 °C), Min:98 °F (36 7 °C), Max:98 6 °F (37 °C)    Temp:  [98 °F (36 7 °C)-98 6 °F (37 °C)] 98 °F (36 7 °C)  HR:  [76-78] 78  Resp:  [18] 18  BP: (118-146)/(63-75) 118/63  SpO2:  [91 %-94 %] 91 %  Body mass index is 35 39 kg/m²  Input and Output Summary (last 24 hours): Intake/Output Summary (Last 24 hours) at 2021 1240  Last data filed at 2021 0900  Gross per 24 hour   Intake 240 ml   Output 850 ml   Net -610 ml       Physical Exam:   Physical Exam  Vitals and nursing note reviewed  Constitutional:       General: She is not in acute distress  Appearance: She is obese  Cardiovascular:      Rate and Rhythm: Normal rate and regular rhythm  Heart sounds: Normal heart sounds  No murmur heard  Pulmonary:      Effort: Pulmonary effort is normal  No respiratory distress  Breath sounds: Rales (b/l) present  No wheezing  Abdominal:      General: Bowel sounds are normal  There is no distension  Palpations: Abdomen is soft  Tenderness: There is no abdominal tenderness  Musculoskeletal:         General: No swelling or tenderness  Skin:     General: Skin is warm and dry  Neurological:      Mental Status: She is alert  Mental status is at baseline     Psychiatric:         Mood and Affect: Mood normal           Additional Data:     Labs:  Results from last 7 days   Lab Units 21  3228 07/15/21  1152   WBC Thousand/uL 2 68* 2 92*   HEMOGLOBIN g/dL 8 7* 9 6*   HEMATOCRIT % 27 3* 29 9*   PLATELETS Thousands/uL 216 220   NEUTROS PCT %  --  79*   LYMPHS PCT %  --  12*   MONOS PCT %  --  8   EOS PCT %  --  0     Results from last 7 days   Lab Units 07/18/21  0459   SODIUM mmol/L 139   POTASSIUM mmol/L 3 1*   CHLORIDE mmol/L 108   CO2 mmol/L 20*   BUN mg/dL 34*   CREATININE mg/dL 3 58*   ANION GAP mmol/L 11   CALCIUM mg/dL 7 5*   ALBUMIN g/dL 2 2*   TOTAL BILIRUBIN mg/dL 0 48   ALK PHOS U/L 42*   ALT U/L 9*   AST U/L 41   GLUCOSE RANDOM mg/dL 100                 Results from last 7 days   Lab Units 07/15/21  1152   LACTIC ACID mmol/L 1 1   PROCALCITONIN ng/ml 0 12       Lines/Drains:  Invasive Devices     Central Venous Catheter Line            CVC Central Lines 07/16/21 Triple Right Other (Comment) 1 day          Line            Hemodialysis AV Fistula Right Upper arm -- days          Drain            Urostomy Ileal conduit RUQ 1747 days    Nephrostomy Left 2 8 Fr  38 days    Percutaneous Nephroureteral Tube (PCNU) Left 1 10 Fr 40 Cm 38 days                Central Line:  Goal for removal: Will discontinue when meds requiring line are completed  Will discontinue when peripheral access obtained  Imaging: Reviewed radiology reports from this admission including: chest xray    Recent Cultures (last 7 days):   Results from last 7 days   Lab Units 07/15/21  2201 07/15/21  1151 07/15/21  1149   BLOOD CULTURE   --  No Growth at 48 hrs  No Growth at 48 hrs    --    URINE CULTURE   --   --  >100,000 cfu/ml    C DIFF TOXIN B BY PCR  Negative  --   --        Last 24 Hours Medication List:   Current Facility-Administered Medications   Medication Dose Route Frequency Provider Last Rate    acetaminophen  650 mg Oral Q4H PRN Maureen Hernández DO      apixaban  2 5 mg Oral Daily Maureen Hernández DO      ascorbic acid  1,000 mg Oral Q12H [facility] [place] Maureen Hernández DO      atorvastatin  40 mg Oral Daily With ConfortVisuel Ashely Kong, DO      calcitriol  0 25 mcg Oral Daily Ashely Kong, DO      cholecalciferol  2,000 Units Oral Daily Ashely Kong, DO      citalopram  20 mg Oral Daily Ashely Kong, DO      famotidine  20 mg Oral Daily Ashely Kong, DO      furosemide  20 mg Intravenous Once VICTORINA Lim      levothyroxine  75 mcg Oral Daily Ashely Kong, DO      loperamide  2 mg Oral 4x Daily PRN Ashely Kong, DO      melatonin  3 mg Oral HS Ashely Kong, DO      metoprolol tartrate  25 mg Oral BID Ashely Kong, DO      zinc sulfate  220 mg Oral Daily Ashely Kong, DO      Followed by   Marlyn Swift ON 7/22/2021] multivitamin-minerals  1 tablet Oral Daily Ashely Kong,       ondansetron  4 mg Intravenous Q4H PRN Ashely Kong,       remdesivir  100 mg Intravenous Q24H VICTORINA Lim      ruxolitinib  10 mg Oral Every Other Day Ashely Kong,       saccharomyces boulardii  250 mg Oral Daily Ashely Kong, DO      vancomycin  125 mg Oral Q6H [address] Ashely Kong,           Today, Patient Was Seen By: VICTORINA Lim    **Please Note: This note may have been constructed using a voice recognition system  **

## 2021-07-18 NOTE — CASE MANAGEMENT
Pt is not a <30 day readmission or current bundle  Risk of unplanned readmission score is 41 (red)  Pt COVID+, on mild Covid pathway receiving IV fluids  Pt currently on 2L supplemental 02, no supplemental 02 at baseline  Pt documented as alert and oriented x4  As pt is COVID+, CM attempted to contact pt in her hospital room however she was having difficulty hearing CM and requested CM call her son, Jose Luis Joshua ([phone]) to complete initial assessment  CM called and spoke with pt's son who informed that pt lives with another son that has "high functioning autism" in a 2 story house that has 7 MARL[address] with railing  Pt was reportedly independent with ADLs PTA, pt uses a single point cane for ambulation and has access to a rolling walker and shower chair  Pt's son is currently staying with Lore Villegas while pt is hospitalized  Pt has a history of [facility] and STR at [address] [place] [address]  No indicated history of inpatient MH treatment or D/A treatment  Pt drives and is retired  PCP is Dr Grace Sawyer ([phone]) and pt uses [facility] pharmacy in [place] for prescriptions  OT recommending STR at time of discharge, awaiting PT eval  CM spoke with pt's son regarding STR recommendation and explained barriers to SNF discharge being COVID+  Pt's son understanding and requested a blanket referral to facilities in the [facility]  Pt's son stated that [address] [place] [address] would be first choice however understands that they may not be accepting COVID+ patients at this time  Referrals to facilities in [facility] placed via [facility], awaiting responses  CM department to follow  CM reviewed d/c planning process including the following: identifying help at home, patient preference for d/c planning needs, Discharge Lounge, [facility] Meds to Bed program, availability of treatment team to discuss questions or concerns patient and/or family may have regarding understanding medications and recognizing signs and symptoms once discharged  CM also encouraged patient to follow up with all recommended appointments after discharge  Patient advised of importance for patient and family to participate in managing patients medical well being

## 2021-07-19 ENCOUNTER — HOSPITAL ENCOUNTER (OUTPATIENT)
Dept: INFUSION CENTER | Facility: HOSPITAL | Age: 76
Discharge: HOME | End: 2021-07-19
Attending: INTERNAL MEDICINE

## 2021-07-19 PROBLEM — E87.6 HYPOKALEMIA: Status: RESOLVED | Noted: 2021-07-18 | Resolved: 2021-07-19

## 2021-07-19 PROBLEM — J96.01 ACUTE RESPIRATORY FAILURE WITH HYPOXIA (HCC): Status: ACTIVE | Noted: 2021-07-19

## 2021-07-19 LAB
ABO GROUP BLD: NORMAL
ALBUMIN SERPL BCP-MCNC: 2.3 G/DL (ref 3.5–5)
ALP SERPL-CCNC: 44 U/L (ref 46–116)
ALT SERPL W P-5'-P-CCNC: 11 U/L (ref 12–78)
ANION GAP SERPL CALCULATED.3IONS-SCNC: 11 MMOL/L (ref 4–13)
AST SERPL W P-5'-P-CCNC: 43 U/L (ref 5–45)
BILIRUB SERPL-MCNC: 0.45 MG/DL (ref 0.2–1)
BLD GP AB SCN SERPL QL: NEGATIVE
BUN SERPL-MCNC: 38 MG/DL (ref 5–25)
CALCIUM ALBUM COR SERPL-MCNC: 9.2 MG/DL (ref 8.3–10.1)
CALCIUM SERPL-MCNC: 7.8 MG/DL (ref 8.3–10.1)
CHLORIDE SERPL-SCNC: 108 MMOL/L (ref 100–108)
CO2 SERPL-SCNC: 21 MMOL/L (ref 21–32)
CREAT SERPL-MCNC: 3.79 MG/DL (ref 0.6–1.3)
CRP SERPL QL: 112 MG/L
D DIMER PPP FEU-MCNC: 1.8 UG/ML FEU
ERYTHROCYTE [DISTWIDTH] IN BLOOD BY AUTOMATED COUNT: 15.9 % (ref 11.6–15.1)
FERRITIN SERPL-MCNC: 2004 NG/ML (ref 8–388)
GFR SERPL CREATININE-BSD FRML MDRD: 11 ML/MIN/1.73SQ M
GLUCOSE SERPL-MCNC: 87 MG/DL (ref 65–140)
HCT VFR BLD AUTO: 27.4 % (ref 34.8–46.1)
HGB BLD-MCNC: 8.8 G/DL (ref 11.5–15.4)
MCH RBC QN AUTO: 29.7 PG (ref 26.8–34.3)
MCHC RBC AUTO-ENTMCNC: 32.1 G/DL (ref 31.4–37.4)
MCV RBC AUTO: 93 FL (ref 82–98)
PLATELET # BLD AUTO: 227 THOUSANDS/UL (ref 149–390)
PMV BLD AUTO: 9.6 FL (ref 8.9–12.7)
POTASSIUM SERPL-SCNC: 3.6 MMOL/L (ref 3.5–5.3)
PROCALCITONIN SERPL-MCNC: 0.11 NG/ML
PROT SERPL-MCNC: 5.8 G/DL (ref 6.4–8.2)
RBC # BLD AUTO: 2.96 MILLION/UL (ref 3.81–5.12)
RH BLD: POSITIVE
SODIUM SERPL-SCNC: 140 MMOL/L (ref 136–145)
SPECIMEN EXPIRATION DATE: NORMAL
WBC # BLD AUTO: 3.19 THOUSAND/UL (ref 4.31–10.16)

## 2021-07-19 PROCEDURE — 85379 FIBRIN DEGRADATION QUANT: CPT | Performed by: NURSE PRACTITIONER

## 2021-07-19 PROCEDURE — 85027 COMPLETE CBC AUTOMATED: CPT | Performed by: NURSE PRACTITIONER

## 2021-07-19 PROCEDURE — 97163 PT EVAL HIGH COMPLEX 45 MIN: CPT

## 2021-07-19 PROCEDURE — 86140 C-REACTIVE PROTEIN: CPT | Performed by: NURSE PRACTITIONER

## 2021-07-19 PROCEDURE — 80053 COMPREHEN METABOLIC PANEL: CPT | Performed by: NURSE PRACTITIONER

## 2021-07-19 PROCEDURE — 99222 1ST HOSP IP/OBS MODERATE 55: CPT | Performed by: INTERNAL MEDICINE

## 2021-07-19 PROCEDURE — 82728 ASSAY OF FERRITIN: CPT | Performed by: NURSE PRACTITIONER

## 2021-07-19 PROCEDURE — 99233 SBSQ HOSP IP/OBS HIGH 50: CPT | Performed by: INTERNAL MEDICINE

## 2021-07-19 PROCEDURE — 99232 SBSQ HOSP IP/OBS MODERATE 35: CPT | Performed by: INTERNAL MEDICINE

## 2021-07-19 PROCEDURE — 94760 N-INVAS EAR/PLS OXIMETRY 1: CPT

## 2021-07-19 PROCEDURE — 84145 PROCALCITONIN (PCT): CPT | Performed by: NURSE PRACTITIONER

## 2021-07-19 PROCEDURE — 86901 BLOOD TYPING SEROLOGIC RH(D): CPT | Performed by: PHYSICIAN ASSISTANT

## 2021-07-19 PROCEDURE — 86850 RBC ANTIBODY SCREEN: CPT | Performed by: PHYSICIAN ASSISTANT

## 2021-07-19 PROCEDURE — 86900 BLOOD TYPING SEROLOGIC ABO: CPT | Performed by: PHYSICIAN ASSISTANT

## 2021-07-19 RX ORDER — DEXAMETHASONE SODIUM PHOSPHATE 4 MG/ML
6 INJECTION, SOLUTION INTRA-ARTICULAR; INTRALESIONAL; INTRAMUSCULAR; INTRAVENOUS; SOFT TISSUE DAILY
Status: DISCONTINUED | OUTPATIENT
Start: 2021-07-19 | End: 2021-07-19

## 2021-07-19 RX ORDER — ALBUTEROL SULFATE 90 UG/1
2 AEROSOL, METERED RESPIRATORY (INHALATION) EVERY 4 HOURS PRN
Status: DISCONTINUED | OUTPATIENT
Start: 2021-07-19 | End: 2021-08-03 | Stop reason: HOSPADM

## 2021-07-19 RX ORDER — DEXAMETHASONE SODIUM PHOSPHATE 4 MG/ML
8 INJECTION, SOLUTION INTRA-ARTICULAR; INTRALESIONAL; INTRAMUSCULAR; INTRAVENOUS; SOFT TISSUE EVERY 12 HOURS SCHEDULED
Status: DISCONTINUED | OUTPATIENT
Start: 2021-07-19 | End: 2021-07-31

## 2021-07-19 RX ORDER — FUROSEMIDE 10 MG/ML
40 INJECTION INTRAMUSCULAR; INTRAVENOUS ONCE
Status: COMPLETED | OUTPATIENT
Start: 2021-07-19 | End: 2021-07-19

## 2021-07-19 RX ORDER — DEXAMETHASONE SODIUM PHOSPHATE 10 MG/ML
INJECTION, SOLUTION INTRAMUSCULAR; INTRAVENOUS
Status: CANCELLED | OUTPATIENT
Start: 2021-07-19

## 2021-07-19 RX ADMIN — MELATONIN TAB 3 MG 3 MG: 3 TAB at 22:26

## 2021-07-19 RX ADMIN — Medication 125 MG: at 06:35

## 2021-07-19 RX ADMIN — DEXAMETHASONE SODIUM PHOSPHATE 8 MG: 4 INJECTION INTRA-ARTICULAR; INTRALESIONAL; INTRAMUSCULAR; INTRAVENOUS; SOFT TISSUE at 22:25

## 2021-07-19 RX ADMIN — Medication 125 MG: at 01:21

## 2021-07-19 RX ADMIN — FUROSEMIDE 40 MG: 10 INJECTION, SOLUTION INTRAVENOUS at 18:02

## 2021-07-19 RX ADMIN — METOPROLOL TARTRATE 25 MG: 25 TABLET, FILM COATED ORAL at 18:14

## 2021-07-19 RX ADMIN — FAMOTIDINE 20 MG: 20 TABLET ORAL at 09:28

## 2021-07-19 RX ADMIN — Medication 125 MG: at 12:13

## 2021-07-19 RX ADMIN — Medication 250 MG: at 18:02

## 2021-07-19 RX ADMIN — REMDESIVIR 100 MG: 100 INJECTION, POWDER, LYOPHILIZED, FOR SOLUTION INTRAVENOUS at 14:48

## 2021-07-19 RX ADMIN — OXYCODONE HYDROCHLORIDE AND ACETAMINOPHEN 1000 MG: 500 TABLET ORAL at 09:29

## 2021-07-19 RX ADMIN — SODIUM BICARBONATE 650 MG TABLET 1300 MG: at 09:30

## 2021-07-19 RX ADMIN — Medication 250 MG: at 09:30

## 2021-07-19 RX ADMIN — OXYCODONE HYDROCHLORIDE AND ACETAMINOPHEN 1000 MG: 500 TABLET ORAL at 22:25

## 2021-07-19 RX ADMIN — ZINC SULFATE 220 MG (50 MG) CAPSULE 220 MG: CAPSULE at 09:29

## 2021-07-19 RX ADMIN — METOPROLOL TARTRATE 25 MG: 25 TABLET, FILM COATED ORAL at 09:29

## 2021-07-19 RX ADMIN — SODIUM BICARBONATE 650 MG TABLET 1300 MG: at 18:02

## 2021-07-19 RX ADMIN — Medication 125 MG: at 18:01

## 2021-07-19 RX ADMIN — LEVOTHYROXINE SODIUM 75 MCG: 75 TABLET ORAL at 06:35

## 2021-07-19 RX ADMIN — CALCITRIOL CAPSULES 0.25 MCG 0.25 MCG: 0.25 CAPSULE ORAL at 09:30

## 2021-07-19 RX ADMIN — APIXABAN 2.5 MG: 2.5 TABLET, FILM COATED ORAL at 09:28

## 2021-07-19 RX ADMIN — CITALOPRAM HYDROBROMIDE 20 MG: 20 TABLET ORAL at 09:30

## 2021-07-19 RX ADMIN — Medication 2000 UNITS: at 09:28

## 2021-07-19 RX ADMIN — ATORVASTATIN CALCIUM 40 MG: 40 TABLET, FILM COATED ORAL at 18:02

## 2021-07-19 RX ADMIN — DEXAMETHASONE SODIUM PHOSPHATE 8 MG: 4 INJECTION INTRA-ARTICULAR; INTRALESIONAL; INTRAMUSCULAR; INTRAVENOUS; SOFT TISSUE at 12:11

## 2021-07-19 NOTE — PROGRESS NOTES
[facility]  Progress Note - Rory Boeck [birth year], 77 y o  female MRN: [id number]  Unit/Bed#: -01 Encounter: [id number]  Primary Care Provider: Karl Hyman MD   Date and time admitted to hospital: 7/15/2021 10:52 AM    * COVID-19 virus infection  Assessment & Plan  · Patient presented with fever, generalized weakness, diarrhea decreasing oral intake and difficulty with ambulation for 1 week per admission record review  · COVID-19 positive   · Pt is not vaccinated   · Patient with increasing oxygen requirements, now on Moderate Pathway  · Check blood type  · CBC and CMP daily  · CRP, D-dimer, and ferritin up-trending - continue to trend  · Abx were discontinued prior given procal negative x2  · Continue vmtain D, vitamin C, zinc, atorvastatin, and famotidine per protocol  · Add IV dexamethasone  · Pulm consuled - will appreciate their recommendations regarding tocilizumab  · Continue remdesivir  · On Eliquis as below  · CXR on admission question mild bilateral groundglass opacity in the upper lungs  · S/p dose of IV Lasix yesterday due to possible volume overload per Nephrology - discussed with them today  · Upgrade to stepdown - low threshold to involve critical care   · Respiratory protocol         Acute respiratory failure with hypoxia ([address] )  Assessment & Plan  · In the setting of COVID-19 as above  · Plan as per above  · Up to 15L mid flow today    Acute renal failure superimposed on stage 5 chronic kidney disease, not on chronic dialysis [facility])  Assessment & Plan  Lab Results   Component Value Date    EGFR 11 07/19/2021    EGFR 12 07/18/2021    EGFR 11 07/17/2021    CREATININE 3 79 (H) 07/19/2021    CREATININE 3 58 (H) 07/18/2021    CREATININE 3 78 (H) 07/17/2021     · Appreciate ongoing Nephrology recs - suspected prerenal azotemia given poor intake and diarrhea +/- component of COVID-19  · Cr baseline 3 4-3 8, follows with Dr Lauro Magana  · Initially received IVF followed by one time dose of lasix 20mg IV yesterday  · Nephrology following     History of Clostridioides difficile colitis  Assessment & Plan  · Noted history of C  Diff  · C  Diff testing was negative on admission, however patient did received IV abx  Will continue PO Vanco for 3 days post last dose of IV abx, then can likely discontinue given negative C  Diff test  Suspect diarrhea may be related to COVID-19    Chronic saddle pulmonary embolism (HCC)  Assessment & Plan  · Continue Eliquis    Febrile illness  Assessment & Plan  · In the setting of COVID infection, plan as per above  · C  diff culture negative  · Urine culture with mixed contaminant x4  · Blood cultures negative x2 at 72 hours  · Procal negative x2  Off abx    Obstructive uropathy  Assessment & Plan  · S/p ileostomy for nonfunctioning bladder and chronic hydro  · OP urology follow up    Polycythemia vera ([address] )  Assessment & Plan  · Patient follows up with outpatient Hematology Oncology, currently on Jakafi     Anemia in CKD (chronic kidney disease)  Assessment & Plan  Lab Results   Component Value Date    HGB 8 8 (L) 07/19/2021    HGB 8 7 (L) 07/18/2021    HGB 9 2 (L) 07/17/2021   · Stable  · Monitor     Class 2 severe obesity due to excess calories with serious comorbidity and body mass index (BMI) of 35 0 to 35 9 in adult [facility])  Assessment & Plan  · Body mass index is 35 39 kg/m²  · Nutrition consult once improved from above      VTE Pharmacologic Prophylaxis:   Pharmacologic: Apixaban (Eliquis)  Mechanical VTE Prophylaxis in Place: Yes    Patient Centered Rounds: I have performed bedside rounds with nursing staff today  Discussions with Specialists or Other Care Team Provider: Dr Debra Kan attending, Dr Javed Matthews Nephrologist, and Dr Los Tolentino     Education and Discussions with Family / Patient: patient and her son, Rand Painter, over the phone   I updated and answered all questions to the best of my ability    Time Spent for Care: 30 minutes  More than 50% of total time spent on counseling and coordination of care as described above  Current Length of Stay: 4 day(s)    Current Patient Status: Inpatient   Certification Statement: The patient will continue to require additional inpatient hospital stay due to increasing oxygen requirements, not medically stable for d/c as above    Discharge Plan: not medically stable for d/c as above    Code Status: Level 1 - Full Code      Subjective:   Ms Ranjeet Guevara reports feeling fatigued today  Objective:     Vitals:   Temp (24hrs), Av 6 °F (37 °C), Min:98 1 °F (36 7 °C), Max:99 1 °F (37 3 °C)    Temp:  [98 1 °F (36 7 °C)-99 1 °F (37 3 °C)] 99 1 °F (37 3 °C)  HR:  [72-76] 76  Resp:  [20] 20  BP: (124-161)/(74-87) 161/87  SpO2:  [89 %-91 %] 89 %  Body mass index is 35 39 kg/m²  Input and Output Summary (last 24 hours): Intake/Output Summary (Last 24 hours) at 2021 1042  Last data filed at 2021 0701  Gross per 24 hour   Intake 220 ml   Output 1650 ml   Net -1430 ml       Physical Exam:     Physical Exam  Vitals and nursing note reviewed  Exam conducted with a chaperone present  Constitutional:       General: She is in acute distress  Appearance: She is ill-appearing and diaphoretic  Cardiovascular:      Rate and Rhythm: Normal rate and regular rhythm  Pulmonary:      Comments: Tachypneic  On 15L mid flow, satting 90-92%   Abdominal:      General: Bowel sounds are normal       Palpations: Abdomen is soft  Tenderness: There is no abdominal tenderness  Musculoskeletal:      Right lower leg: No edema  Skin:     General: Skin is warm  Neurological:      Mental Status: She is alert and oriented to person, place, and time     Psychiatric:         Mood and Affect: Mood normal          Behavior: Behavior normal          Additional Data:     Labs:    Results from last 7 days   Lab Units 21  0637 07/15/21  1152   WBC Thousand/uL 3 19* 2 92*   HEMOGLOBIN g/dL 8  8* 9 6*   HEMATOCRIT % 27 4* 29 9*   PLATELETS Thousands/uL 227 220   NEUTROS PCT %  --  79*   LYMPHS PCT %  --  12*   MONOS PCT %  --  8   EOS PCT %  --  0     Results from last 7 days   Lab Units 07/19/21  0637   SODIUM mmol/L 140   POTASSIUM mmol/L 3 6   CHLORIDE mmol/L 108   CO2 mmol/L 21   BUN mg/dL 38*   CREATININE mg/dL 3 79*   ANION GAP mmol/L 11   CALCIUM mg/dL 7 8*   ALBUMIN g/dL 2 3*   TOTAL BILIRUBIN mg/dL 0 45   ALK PHOS U/L 44*   ALT U/L 11*   AST U/L 43   GLUCOSE RANDOM mg/dL 87                 Results from last 7 days   Lab Units 07/19/21  0637 07/15/21  1152   LACTIC ACID mmol/L  --  1 1   PROCALCITONIN ng/ml 0 11 0 12           * I Have Reviewed All Lab Data Listed Above  * Additional Pertinent Lab Tests Reviewed: All Labs Within Last 24 Hours Reviewed    Imaging:  Imaging Reports Reviewed Today Include: CXR  Imaging Personally Reviewed by Myself Includes:  none    Recent Cultures (last 7 days):     Results from last 7 days   Lab Units 07/15/21  2201 07/15/21  1151 07/15/21  1149   BLOOD CULTURE   --  No Growth at 72 hrs    No Growth at 72 hrs   --    URINE CULTURE   --   --  >100,000 cfu/ml    C DIFF TOXIN B BY PCR  Negative  --   --        Last 24 Hours Medication List:   Current Facility-Administered Medications   Medication Dose Route Frequency Provider Last Rate    acetaminophen  650 mg Oral Q4H PRN [facility] [place], DO      apixaban  2 5 mg Oral Daily [facility] [place], DO      ascorbic acid  1,000 mg Oral Q12H [address] [facility] [place], DO      atorvastatin  40 mg Oral Daily With [place], DO      calcitriol  0 25 mcg Oral Daily [facility] [place], DO      cholecalciferol  2,000 Units Oral Daily [facility] [place], DO      citalopram  20 mg Oral Daily [facility] [place], DO      famotidine  20 mg Oral Daily [facility] [place], DO      levothyroxine  75 mcg Oral Daily [facility] [place], DO      melatonin  3 mg Oral HS [facility] [place], DO      metoprolol tartrate  25 mg Oral BID [facility] [place], DO      zinc sulfate 220 mg Oral Daily Bernarda Adames DO      Followed by   Samantha Conrad ON 7/22/2021] multivitamin-minerals  1 tablet Oral Daily Bernarda Adames DO      ondansetron  4 mg Intravenous Q4H PRN Bernarda Adames DO      remdesivir  100 mg Intravenous Q24H Danial Flores, FARIDANP      ruxolitinib  10 mg Oral Every Other Day Bernarda Adames DO      saccharomyces boulardii  250 mg Oral BID Fessendenus Flores, FARIDANP      sodium bicarbonate  1,300 mg Oral BID after meals Khushi Swift,       vancomycin  125 mg Oral Q6H [facility] Derick Krause PA-C          Today, Patient Was Seen By: Derick Krause PA-C    ** Please Note: Dictation voice to text software may have been used in the creation of this document   **

## 2021-07-19 NOTE — PROGRESS NOTES
NEPHROLOGY PROGRESS NOTE   Johnathon Santillan 77 y o  female MRN: [id number]  Unit/Bed#: -01 Encounter: [id number]      ASSESSMENT & PLAN    1  LUANN (POA) on CKD likely pre renal azotemia that has progressed to ATN plus mining component of COVID-19 on stage 5 chronic kidney disease  o Creatinine was as high as 4 09 and now has been around 3 8-3 9  o Consent for dialysis was obtained by my colleague  o Baseline creatinine has been in the mid threes  o Electrolytes and acid-base status are stable  o Will monitor closely for the initiation of renal replacement therapy  o Increased work of breathing would repeat chest x-ray  o Has had a negative fluid balance with poor p o  Intake will hold diuretics for right now  o Has left-sided hydronephrosis with the recent history of left-sided nephrostomy tube and solitary kidney function with left renal atrophy    2  Electrolytes/Acid Base  o Severe metabolic acidosis-continue to monitor-currently on sodium bicarbonate    3  Blood Pressure  o Blood pressures are currently stable  o Avoid hypotension  o Currently on metoprolol 25 mg 2 times daily    4  Anemia of CKD  o Hemoglobins are stable, platelet count stable will monitor    5  CKD-BMD  o Continue calcitriol for secondary hyperparathyroidism on vitamin D3 as well 2000 units  o Monitor phosphorus    6   Clinical Course/CV Risk Reduction/Health maintenance/communication  o COVID-19 pneumonia-ongoing treatment per primary    DISCUSSION    Will monitor has had some increased work of breathing  Consider repeat chest x-ray pulmonary following  Low threshold for higher level of care    SUBJECTIVE:    Patient was seen today sitting up in chair  Increased work of breathing from overnight    OBJECTIVE:  Current Weight: Weight - Scale: 82 2 kg (181 lb 3 5 oz)  @  Vitals:    07/18/21 1728 07/18/21 2200 07/19/21 0701 07/19/21 0800   BP: 124/79 145/74  161/87   Pulse:  72  76   Resp:  20     Temp:  98 1 °F (36 7 °C) 99 1 °F (37 3 °C) [facility]  Oral Oral    SpO2:  91%  (!) 89%   Weight:       Height:           Intake/Output Summary (Last 24 hours) at 7/19/2021 1055  Last data filed at 7/19/2021 0701  Gross per 24 hour   Intake 220 ml   Output 1650 ml   Net -1430 ml     Weight (last 2 days)     None          General:  Frail and elderly  Eyes: conjunctivae pink, anicteric sclerae  ENT: lips and mucous membranes moist  Neck: supple, no JVD  Chest:  Increased respiratory rate  CVS: normal heart rate, no friction rub  Abdomen: soft, non-tender, non-distended, normoactive bowel sounds  Extremities: no edema of both legs  Skin: no rash  Neuro: awake, alert, oriented      Medications:    Current Facility-Administered Medications:     acetaminophen (TYLENOL) tablet 650 mg, 650 mg, Oral, Q4H PRN, Mitali Mejia DO    albuterol (PROVENTIL HFA,VENTOLIN HFA) inhaler 2 puff, 2 puff, Inhalation, Q4H PRN, Shawanda Narvaez MD    apixaban [facility]) tablet 2 5 mg, 2 5 mg, Oral, Daily, Mitali Mejia DO, 2 5 mg at 07/19/21 7646    ascorbic acid (VITAMIN C) tablet 1,000 mg, 1,000 mg, Oral, Q12H [address], Mitali Mejia DO, 1,000 mg at 07/19/21 0929    atorvastatin (LIPITOR) tablet 40 mg, 40 mg, Oral, Daily With Milena Walters DO, 40 mg at 07/18/21 1728    calcitriol (ROCALTROL) capsule 0 25 mcg, 0 25 mcg, Oral, Daily, Mitali Mejia DO, 0 25 mcg at 07/19/21 0930    cholecalciferol (VITAMIN D3) tablet 2,000 Units, 2,000 Units, Oral, Daily, Mitali Mejia DO, 2,000 Units at 07/19/21 0928    citalopram (CeleXA) tablet 20 mg, 20 mg, Oral, Daily, Mitali Mejia DO, 20 mg at 07/19/21 0930    famotidine (PEPCID) tablet 20 mg, 20 mg, Oral, Daily, Mitali Mejia DO, 20 mg at 07/19/21 9031    levothyroxine tablet 75 mcg, 75 mcg, Oral, Daily, Mitali Mejia DO, 75 mcg at 07/19/21 0635    melatonin tablet 3 mg, 3 mg, Oral, HS, Mitali Mejia DO, 3 mg at 07/18/21 2148    metoprolol tartrate (LOPRESSOR) tablet 25 mg, 25 mg, Oral, BID, Mitali Mejia DO, 25 mg at 07/19/21 7833    zinc sulfate (ZINCATE) capsule 220 mg, 220 mg, Oral, Daily, 220 mg at 07/19/21 0929 **FOLLOWED BY** [START ON 7/22/2021] multivitamin-minerals (CENTRUM ADULTS) tablet 1 tablet, 1 tablet, Oral, Daily, Leopoldo Conquest, DO    [facility]ron [facility]) injection 4 mg, 4 mg, Intravenous, Q4H PRN, Leopoldo Conquest, DO, 4 mg at 07/17/21 0845    [COMPLETED] remdesivir (Veklury) 200 mg in sodium chloride 0 9 % 250 mL IVPB, 200 mg, Intravenous, Q24H, Held at 07/16/21 1820 **FOLLOWED BY** remdesivir (Veklury) 100 mg in sodium chloride 0 9 % 250 mL IVPB, 100 mg, Intravenous, Q24H, VICTORINA Gil, 100 mg at 07/18/21 1305    ruxolitinib (JAKAFI) tablet 10 mg, 10 mg, Oral, Every Other Day, Leopoldo Conquest, DO, 10 mg at 07/18/21 2774    saccharomyces boulardii (FLORASTOR) capsule 250 mg, 250 mg, Oral, BID, VICTORINA Gil, 250 mg at 07/19/21 0930    sodium bicarbonate tablet 1,300 mg, 1,300 mg, Oral, BID after meals, Rosalynn Lanes Dunn, DO, 1,300 mg at 07/19/21 0930    vancomycin (VANCOCIN) oral solution 125 mg, 125 mg, Oral, Q6H [facility], Ines Norton PA-C, 125 mg at 07/19/21 1532    Lab Results:   Results from last 7 days   Lab Units 07/19/21  0637 07/18/21  0459 07/17/21  0529 07/16/21  2133 07/15/21  1152 07/15/21  1151 07/15/21  1149   WBC Thousand/uL 3 19* 2 68* 2 92*  --  2 92*  --   --    HEMOGLOBIN g/dL 8 8* 8 7* 9 2*  --  9 6*  --   --    HEMATOCRIT % 27 4* 27 3* 28 2*  --  29 9*  --   --    PLATELETS Thousands/uL 227 216 227  --  220  --   --    POTASSIUM mmol/L 3 6 3 1* 3 2* 3 3* 3 6  --   --    CHLORIDE mmol/L 108 108 104 107 110*  --   --    CO2 mmol/L 21 20* 21 15* 13*  --   --    BUN mg/dL 38* 34* 40* 43* 44*  --   --    CREATININE mg/dL 3 79* 3 58* 3 78* 3 89* 4 09*  --   --    CALCIUM mg/dL 7 8* 7 5* 7 6* 8 0* 8 7  --   --    PHOSPHORUS mg/dL  --  2 8  --   --   --   --   --    ALK PHOS U/L 44* 42* 46 48 56  --   --    ALT U/L 11* 9* 10* 12 13  --   --    AST U/L 43 41 40 50* 39  --   --    BLOOD CULTURE --   --   --   --   --  No Growth at 72 hrs  No Growth at 72 hrs  --    LEUKOCYTES UA   --   --   --   --   --   --  Large*   BLOOD UA   --   --   --   --   --   --  Large*         Portions of the record may have been created with voice recognition software  Occasional wrong word or "sound a like" substitutions may have occurred due to the inherent limitations of voice recognition software  Read the chart carefully and recognize, using context, where substitutions have occurred  If you have any questions, please contact the dictating provider

## 2021-07-19 NOTE — ASSESSMENT & PLAN NOTE
· Patient presented with fever, generalized weakness, diarrhea decreasing oral intake and difficulty with ambulation for 1 week per admission record review  · COVID-19 positive   · Pt is not vaccinated   · Patient with increasing oxygen requirements, now on Moderate Pathway  · Check blood type  · CBC and CMP daily  · CRP, D-dimer, and ferritin up-trending - continue to trend  · Abx were discontinued prior given procal negative x2  · Continue vmtain D, vitamin C, zinc, atorvastatin, and famotidine per protocol  · Add IV dexamethasone  · Pulm consuled - will appreciate their recommendations regarding tocilizumab  · Continue remdesivir  · On Eliquis as below  · CXR on admission question mild bilateral groundglass opacity in the upper lungs  · S/p dose of IV Lasix yesterday due to possible volume overload per Nephrology - discussed with them today  · Upgrade to stepdown - low threshold to involve critical care   · Respiratory protocol

## 2021-07-19 NOTE — QUICK NOTE
Discussed with Pulmonology - initially, as patient was on Moderate Pathway, patient was ordered 6mg IV Decadron daily per protocol  I later received message from Pulmonology that steroids were increased to 8mg IV Q12hr and patient was placed on high flow  I discussed with the patient with Respiratory Therapist present  Per patient she would accept intubation if necessary; of note, the patient was made Level 1 full code on admission as well as prior admissions back to 2016 per record review  Recommend ongoing [address] discussions  Per Pulmonologist, reluctant to give tocilizumab given her diarrhea/history of C  Diff/previous bowel perf  Continue remdesivir  Contacted critical care attending, Dr Kim Adam  Consult to Critical Care placed  I attempted to call the patient's son, Reji Posadautant, who I spoke with earlier today 2x with no answser now  I did speak with the patient's daughter, Shalini, over the phone with an update and answered all questions to the best of my ability

## 2021-07-19 NOTE — PHYSICAL THERAPY NOTE
Physical Therapy Evaluation    Patient's Name: Edwin Ross    Admitting Diagnosis  UTI (urinary tract infection) [N39 0]  Fever [R50 9]  Generalized weakness [R53 1]    Problem List  Patient Active Problem List   Diagnosis    Chronic saddle pulmonary embolism (HCC)    Bladder mass    Small bowel obstruction (HCC)    Obstructive uropathy    Secondary hyperparathyroidism of renal origin ([address] )    Hypertension    Polycythemia vera ([address] )    Intraventricular hemorrhage (HCC)    Anemia in CKD (chronic kidney disease)    Mass of urethra    Acute renal failure superimposed on stage 5 chronic kidney disease, not on chronic dialysis ([address] )    Thoracic compression fracture (HCC)    Benign neoplasm of supratentorial region of brain ([address] )    Meningioma ([address] )    Inverted T wave    Polycythemia    History of Clostridioides difficile colitis    History of shingles    Depression    Class 2 severe obesity due to excess calories with serious comorbidity and body mass index (BMI) of 35 0 to 35 9 in [facility])    Chronic thrombosis of subclavian vein (HCC)    Hyperlipemia    Gross hematuria    Hydronephrosis of left kidney    Anemia    Constipation    Obstructive left pyelonephritis    Right upper quadrant cyst    Obesity, morbid ([address] )    Febrile illness    COVID-19 virus infection    Acute respiratory failure with hypoxia ([address] )       Past Medical History  Past Medical History:   Diagnosis Date    Anxiety     Bowel obstruction (HCC)     Chronic kidney disease (CKD), stage IV (severe) (HCC)     stage IV    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    Circulation problem     Compression fracture of cervical spine (HCC)     Hernia of abdominal cavity     History of kidney problems     Hydronephrosis     Hypertension     IBS (irritable bowel syndrome)     Incontinence     Lung mass     Improving on PET/CT 1/2016    Polycythemia vera ([address] )     Pulmonary embolism ([address] ) 2014    Shingles     Urinary tract infection        Past Surgical History  Past Surgical History:   Procedure Laterality Date    ABDOMINAL ADHESION SURGERY N/A 8/9/2020    Procedure: LYSIS ADHESIONS;  Surgeon: Khushi Gee DO;  [facility];  Service: General    BLADDER SUSPENSION      BOTOX INJECTION N/A 7/27/2016    Procedure: BOTOX INJECTION ;  Surgeon: Denny Valencia MD;  [facility];  Service:    [address], RADICAL WITH ILEOCONDUIT N/A 10/4/2016    Procedure: [place] WITH ILEAL CONDUIT ;  Surgeon: Denny Valencia MD;  [facility];  Service:    Sofi Booth W/ RETROGRADES Bilateral 7/27/2016    Procedure: Edu Sosa; RETROGRADE PYELOGRAM ;  Surgeon: Denny Valencia MD;  [facility];  Service:     HERNIA REPAIR      IR AV FISTULAGRAM/GRAFTOGRAM  2/10/2021    IR NEPHROSTOMY TO NEPHROURETERAL STENT  5/15/2021    IR NEPHROSTOMY TO NEPHROURETERAL STENT  6/9/2021    IR NEPHROSTOMY TUBE CHECK AND/OR REMOVAL  6/16/2021    IR NEPHROSTOMY TUBE CHECK/CHANGE/REPOSITION/REINSERTION/UPSIZE  5/14/2021    IR NEPHROSTOMY TUBE CHECK/CHANGE/REPOSITION/REINSERTION/UPSIZE  5/21/2021    IR NEPHROSTOMY TUBE PLACEMENT  5/10/2021    IR NON-TUNNELED CENTRAL LINE PLACEMENT  7/17/2020    IR NON-TUNNELED CENTRAL LINE PLACEMENT  8/14/2020    IR NON-TUNNELED CENTRAL LINE PLACEMENT  7/16/2021    LAPAROTOMY N/A 8/9/2020    Procedure: LAPAROTOMY EXPLORATORY, PARASTOMAL HERNIA REPAIR WITH MESH;  Surgeon: Khushi Gee DO;  [facility];  Service: General    MT ANASTOMOSIS,AV,ANY SITE Right 1/5/2021    Procedure: Creation of right brachiobasilic fistula; Surgeon: Dharmesh Chairez MD;  [facility];  Service: Vascular    MT COLONOSCOPY FLX DX W/COLLJ SPEC WHEN PFRMD N/A 8/31/2016    Procedure: COLONOSCOPY;  Surgeon: Keila Vines MD;  [facility];   Service: Gastroenterology    MT CYSTOSCOPY,INSERT URETERAL STENT Bilateral 7/27/2016    Procedure: STENT INSERTION; EXCISION OF MESH ;  Surgeon: Jorge A Silverman MD;  [facility];  Service: Urology    TONSILLECTOMY      TUBAL LIGATION      WISDOM TOOTH EXTRACTION          07/19/21 1400   PT Last Visit   PT Visit Date 07/19/21   Note Type   Note type Evaluation   Pain Assessment   Pain Assessment Tool Pain Assessment not indicated - pt denies pain   Home Living   Type of [address] Two level;Bed/bath upstairs;Stairs to enter with rails   Home Equipment Walker;Cane   Additional Comments Pt lives in a [facility] with 6 MARLON, bed/bath upstairs  Has RW and SPC, uses as needed    Prior Function   Level of [place] Independent with ADLs and functional mobility   Lives With Son   ADL Assistance Independent   IADLs Independent   Comments Pt lives with her son who is autistic (high functioning)   Restrictions/Precautions   Weight Bearing Precautions Per Order No   Other Precautions Contact/isolation; Airborne/isolation;Telemetry;O2;Fall Risk;Pain  (HFNC, COVID (+))   Cognition   Arousal/Participation Lethargic   Attention Attends with cues to redirect   Orientation Level Oriented X4   Memory Decreased recall of precautions   Following Commands Follows one step commands with increased time or repetition   Comments Pt presents with flat affect, agreeable to work with therapy but requires encouragement  RLE Assessment   RLE Assessment WFL  (grossly 4-/5)   LLE Assessment   LLE Assessment WFL  (grossly 4-/5)   Bed Mobility   Additional Comments Seated OOB upon arrival   Transfers   Sit to Stand 3  Moderate assistance   Additional items Assist x 1; Increased time required;Verbal cues   Stand to Sit 3  Moderate assistance   Additional items Assist x 1; Increased time required;Verbal cues   Additional Comments Transfers with RW  VCs for proper hand placement and safety   Ambulation/Elevation   Gait pattern Excessively slow; Short stride;Decreased foot clearance   Gait Assistance 4  Minimal assist   Additional items Assist x 1;Verbal cues  (SBA of 2nd for chair follow)   Assistive Device Rolling walker   Distance 8ft    Balance   Static Sitting Fair -   Dynamic Sitting Fair -   Static Standing Poor +   Dynamic Standing Poor +   Ambulatory Poor +   Activity Tolerance   Activity Tolerance Patient limited by fatigue   Nurse Made Aware ok to see per RN   Assessment   Prognosis Good   Problem List Decreased strength;Decreased endurance; Impaired balance;Decreased mobility   Assessment Pt is a 77 y o  female seen for PT evaluation s/p admit to [facility] on 7/15/2021  Pt was admitted with a primary dx of: COVID-19  PT now consulted for assessment of mobility and d/c needs  Pt with Up with assistance, Ambulate, PT evaluation orders  Pts current comorbidities effecting treatment include: anxiety, CKD, compression fx of c-spine, IBS, HTN, lung mass, incontinence, shingles, UTI  Pts current clinical presentation is Unstable/ Unpredictable (high complexity) due to Ongoing medical management for primary dx, Increased reliance on more restrictive AD compared to baseline, Decreased activity tolerance compared to baseline, Fall risk, Increased assistance needed from caregiver at current time, Ongoing telemetry monitoring, Increased O2 via NC from pts baseline  Prior to admission, pt was I with ADLs and ambulating w/ SPC vs RW as needed  Upon evaluation, pt currently is requiring modA for transfers and Nathan for ambulation 8 ft w/ RW  Pt presents at PT eval functioning below baseline and currently w/ overall mobility deficits 2* to: BLE weakness, impaired balance, decreased endurance, gait deviations, decreased activity tolerance compared to baseline, decreased functional mobility tolerance compared to baseline, fall risk, SOB upon exertion  Pt currently at a fall risk 2* to impairments listed above    Pt will continue to benefit from skilled acute PT interventions to address stated impairments; to maximize functional mobility; for ongoing pt/ family training; and DME needs  At conclusion of PT session pt returned back in chair with phone and call bell within reach  Pt denies any further questions at this time  The patient's AM-PAC Basic Mobility Inpatient Short Form Raw Score is 13, Standardized Score is 33 99  A standardized score less than 42 9 suggests the patient may benefit from discharge to post-acute rehabilitation services  Please also refer to the recommendation of the Physical Therapist for safe discharge planning  Recommend rehab upon hospital D/C  Barriers to Discharge Decreased caregiver support; Inaccessible home environment   Goals   Patient Goals none stated   [facility] Expiration Date 08/02/21   Short Term Goal #1 In 10-14 days pt will be able to: 1  Demonstrate ability to perform all aspects of bed mobility independently to increase functional independence  2  Perform functional transfers at mod I level to facilitate safe return to previous living environment  3   Ambulate 200 ft with LRAD at mod I level with stable vitals to improve safety with household distances and reduce fall risk  4  Climb 6+12 steps with HR at mod I level to simulate entrance to home and access to 2nd floor bed/bath  5  Improve LE strength grades by 1 to increase ease of functional mobility with transfers and gait  6  Pt will demonstrate improved balance by one grade order to decrease risk of falls  PT Treatment Day 0   Plan   Treatment/Interventions Functional transfer training;LE strengthening/ROM; Therapeutic exercise;Elevations; Endurance training;Equipment eval/education; Bed mobility;Gait training;Spoke to nursing;Spoke to case management   PT Frequency Other (Comment)  (3-5x/wk)   Recommendation   PT Discharge Recommendation Post acute rehabilitation services   PT - OK to Discharge Yes  (to rehab, when medically appropriate)   Malorie 8 in Bed Without Bedrails 2 Lying on Back to Sitting on Edge of Flat Bed 2   Moving Bed to Chair 2   Standing Up From Chair 2   Walk in Room 3   Climb 3-5 Stairs 2   Basic Mobility Inpatient Raw Score 13   Basic Mobility Standardized Score 33 99   Modified [place] Scale   Modified [place] Scale 4       Monisha Eubanks, PT, DPT

## 2021-07-19 NOTE — ASSESSMENT & PLAN NOTE
Lab Results   Component Value Date    EGFR 11 07/19/2021    EGFR 12 07/18/2021    EGFR 11 07/17/2021    CREATININE 3 79 (H) 07/19/2021    CREATININE 3 58 (H) 07/18/2021    CREATININE 3 78 (H) 07/17/2021     · Appreciate ongoing Nephrology recs - suspected prerenal azotemia given poor intake and diarrhea +/- component of COVID-19  · Cr baseline 3 4-3 8, follows with Dr Lorene Haines  · Initially received IVF followed by one time dose of lasix 20mg IV yesterday  · Nephrology following

## 2021-07-19 NOTE — ASSESSMENT & PLAN NOTE
Lab Results   Component Value Date    HGB 8 8 (L) 07/19/2021    HGB 8 7 (L) 07/18/2021    HGB 9 2 (L) 07/17/2021   · Stable  · Monitor

## 2021-07-19 NOTE — ASSESSMENT & PLAN NOTE
· Noted history of C  Diff  · C  Diff testing was negative on admission, however patient did received IV abx  Will continue PO Vanco for 3 days post last dose of IV abx, then can likely discontinue given negative C   Diff test  Suspect diarrhea may be related to COVID-19

## 2021-07-19 NOTE — PLAN OF CARE
Problem: PHYSICAL THERAPY ADULT  Goal: Performs mobility at highest level of function for planned discharge setting  See evaluation for individualized goals  Description: Treatment/Interventions: Functional transfer training, LE strengthening/ROM, Therapeutic exercise, Elevations, Endurance training, Equipment eval/education, Bed mobility, Gait training, Spoke to nursing, Spoke to case management          See flowsheet documentation for full assessment, interventions and recommendations  Note: Prognosis: Good  Problem List: Decreased strength, Decreased endurance, Impaired balance, Decreased mobility  Assessment: Pt is a 77 y o  female seen for PT evaluation s/p admit to [facility] on [facility]/15/[facility]21  Pt was admitted with a primary dx of: COVID-19  PT now consulted for assessment of mobility and d/c needs  Pt with Up with assistance, Ambulate, PT evaluation orders  Pts current comorbidities effecting treatment include: anxiety, CKD, compression fx of c-spine, IBS, HTN, lung mass, incontinence, shingles, UTI  Pts current clinical presentation is Unstable/ Unpredictable (high complexity) due to Ongoing medical management for primary dx, Increased reliance on more restrictive AD compared to baseline, Decreased activity tolerance compared to baseline, Fall risk, Increased assistance needed from caregiver at current time, Ongoing telemetry monitoring, Increased O2 via NC from pts baseline  Prior to admission, pt was I with ADLs and ambulating w/ SPC vs RW as needed  Upon evaluation, pt currently is requiring modA for transfers and Nathan for ambulation 8 ft w/ RW  Pt presents at PT eval functioning below baseline and currently w/ overall mobility deficits 2* to: BLE weakness, impaired balance, decreased endurance, gait deviations, decreased activity tolerance compared to baseline, decreased functional mobility tolerance compared to baseline, fall risk, SOB upon exertion   Pt currently at a fall risk 2* to impairments listed above  Pt will continue to benefit from skilled acute PT interventions to address stated impairments; to maximize functional mobility; for ongoing pt/ family training; and DME needs  At conclusion of PT session pt returned back in chair with phone and call bell within reach  Pt denies any further questions at this time  The patient's AM-PAC Basic Mobility Inpatient Short Form Raw Score is 13, Standardized Score is 33 99  A standardized score less than 42 9 suggests the patient may benefit from discharge to post-acute rehabilitation services  Please also refer to the recommendation of the Physical Therapist for safe discharge planning  Recommend rehab upon hospital D/C  Barriers to Discharge: Decreased caregiver support, Inaccessible home environment        PT Discharge Recommendation: Post acute rehabilitation services     PT - OK to Discharge: Yes (to rehab, when medically appropriate)    See flowsheet documentation for full assessment

## 2021-07-19 NOTE — QUICK NOTE
ICU Attending Note   · Asked to evaluate patient now on HFNC COVID-19 infection  · Patient reports feeling improved, no chest pain, no pleurisy  · SpO2 98% HFNC 0 7/45L - weaned to 0 6/45L with SpO2 95-96%  · Lungs basilar rales, no accessory muscle use, no wheeze    Recommendations:  · Continue COVID-19 directed therapies, increased decadron dosing  · Wean O2 to keep SpO2 >90%, IS, OOB-Chair  · Stable to remain Level II SDU status  · Discussed with DIMITRI Thornton  · Pulmonary service to continue to follow    Catalina Garcia DO

## 2021-07-19 NOTE — CONSULTS
Consultation - Pulmonary Medicine   Monica Conklin 77 y o  female MRN: [id number]  Unit/Bed#: -01 Encounter: [id number]      Physician Requesting Consult: Dr Patito Alves  Reason for Consult: covi-19 pna and acute hypoxic respiratory failure    Assessment/Plan:     28-year-old woman with multiple comorbidities who presented to the emergency room due to fatigue, diarrhea found to have COVID-19 pneumonia who was had worsening of her underlying condition with development of acute hypoxic respiratory failure necessitating mid flow nasal cannula  1    COVID-19 pneumonia in unvaccinated patient  ·  would continue remdesivir to complete 5 day course   · Continue other elements of COVID-19 pathway with vitamins   · Have started Decadron 8 mg b i d  · At this point,  I do not recommend Actemra given presentation with diarrhea and also history of bowel perforation  Risk appears to outweigh potential benefit   · Continue other supportive care regarding chronic medical conditions  ·  would consider palliative care referral given patient's advanced age, comorbidities , 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, and overall potential  for a poor response to therapy he  2   acute hypoxic respiratory failure secondary to above  ·  continue supplemental oxygen to maintain saturations greater 90% the   ·  diuresis/ dialysis per Nephrology  ·   3  History of saddle pulmonary embolism  -continue Eliquis  ______________________________________________________________________    HPI:    Monica Conklin is a 77 y o  female with  Multiple medical problems to include polycythemia vera, history of saddle pulmonary embolism ( she is on Eliquis ), obstructive uropathy, CKD stage 5D, history of C diff colitis who presented to the emergency room on 07/15/2021 due to generalized weakness, decreased oral intake the and diarrhea  In the emergency room COVID-19 PCR was positive    Chest x-ray shows mild bilateral does opacities in the upper lungs  History is mostly obtained from review of chart as patient  gave me a very limited history and did mayte answer many questions  Initially when she presented she did not require supplemental oxygen  However opacities had increased oxygen requirements and the requiring the 15 L mid flow  She has been treated with remdesivir  IV steroids were started today  She has received other elements of COVID-19 a treatment protocol  Patient states that she feels fatigued  She states that she never received a COVID-19 vaccination  She did not have any other complaints when I assessed her  She did not answer my question when I asked her if she would want to be intubated if  her respiratory status continued to decline  C diff is negative  D-dimer has increased to 1 8, C reactive protein is 112, blood cultures are ithout growth to date  PFT results:    None on file    Review of Systems:    Full 12 point review of systems was performed  Aside from what was mentioned in the HPI, it is otherwise negative      Historical Information   Past Medical History:   Diagnosis Date    Anxiety     Bowel obstruction (HCC)     Chronic kidney disease (CKD), stage IV (severe) (HCC)     stage IV    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    Circulation problem     Compression fracture of cervical spine (HCC)     Hernia of abdominal cavity     History of kidney problems     Hydronephrosis     Hypertension     IBS (irritable bowel syndrome)     Incontinence     Lung mass     Improving on PET/CT 1/2016    Polycythemia vera ([address]     Pulmonary embolism ([address] ) 2014    Shingles     Urinary tract infection      Past Surgical History:   Procedure Laterality Date    ABDOMINAL ADHESION SURGERY N/A 8/9/2020    Procedure: LYSIS ADHESIONS;  Surgeon: Abilio Nguyen DO;  [facility];  Service: General    BLADDER SUSPENSION      BOTOX INJECTION N/A 7/27/2016    Procedure: BOTOX INJECTION ;  Surgeon: Herminia Pandey MD;  [facility];  Service:     CHOLECYSTECTOMY N/A     COLONOSCOPY      CYSTECTOMY, RADICAL WITH ILEOCONDUIT N/A 10/4/2016    Procedure: Chick Eth WITH ILEAL CONDUIT ;  Surgeon: Herminia Pandey MD;  [facility];  Service:    [facility] CYSTOSCOPY W/ RETROGRADES Bilateral 7/27/2016    Procedure: Vinie Dancer; RETROGRADE PYELOGRAM ;  Surgeon: Herminia Pandey MD;  [facility];  Service:     HERNIA REPAIR      IR AV FISTULAGRAM/GRAFTOGRAM  2/10/2021    IR NEPHROSTOMY TO NEPHROURETERAL STENT  5/15/2021    IR NEPHROSTOMY TO NEPHROURETERAL STENT  6/9/2021    IR NEPHROSTOMY TUBE CHECK AND/OR REMOVAL  6/16/2021    IR NEPHROSTOMY TUBE CHECK/CHANGE/REPOSITION/REINSERTION/UPSIZE  5/14/2021    IR NEPHROSTOMY TUBE CHECK/CHANGE/REPOSITION/REINSERTION/UPSIZE  5/21/2021    IR NEPHROSTOMY TUBE PLACEMENT  5/10/2021    IR NON-TUNNELED CENTRAL LINE PLACEMENT  7/17/2020    IR NON-TUNNELED CENTRAL LINE PLACEMENT  8/14/2020    IR NON-TUNNELED CENTRAL LINE PLACEMENT  7/16/2021    LAPAROTOMY N/A 8/9/2020    Procedure: LAPAROTOMY EXPLORATORY, PARASTOMAL HERNIA REPAIR WITH MESH;  Surgeon: Cj Wheeler DO;  [facility];  Service: General    AR ANASTOMOSIS,AV,ANY SITE Right 1/5/2021    Procedure: Creation of right brachiobasilic fistula; Surgeon: Robbie Chairez MD;  [facility];  Service: Vascular    AR COLONOSCOPY FLX DX W/COLLJ SPEC WHEN PFRMD N/A 8/31/2016    Procedure: COLONOSCOPY;  Surgeon: Miles Wilcox MD;  [facility];   Service: Gastroenterology    AR CYSTOSCOPY,INSERT URETERAL STENT Bilateral 7/27/2016    Procedure: STENT INSERTION; EXCISION OF MESH ;  Surgeon: Herminia Pandey MD;  [facility];  Service: Urology    TONSILLECTOMY      TUBAL LIGATION      WISDOM TOOTH EXTRACTION       Social History   Social History     Substance and Sexual Activity   Alcohol Use Not Currently    Comment: n/s     Social History     Tobacco Use   Smoking Status Never Smoker   Smokeless Tobacco Never Used   Tobacco Comment    n/a       Family History:   Family History   Problem Relation Age of Onset    Cancer Mother         small cell cancer     Heart disease Father     COPD Father     Heart disease Brother     Nephrolithiasis Brother        Medications: The patient's active and prehospital medications were reviewed    Current Facility-Administered Medications   Medication Dose Route Frequency Provider Last Rate    acetaminophen  650 mg Oral Q4H PRN [facility], DO      albuterol  2 puff Inhalation Q4H PRN Asia Pollard MD      apixaban  2 5 mg Oral Daily [facility], DO      ascorbic acid  1,000 mg Oral Q12H [facility], DO      atorvastatin  40 mg Oral Daily [facility] [employer] [facility], DO      calcitriol  0 25 mcg Oral Daily [facility], DO      cholecalciferol  2,000 Units Oral Daily [facility], DO      citalopram  20 mg Oral Daily [facility], DO      dexamethasone  8 mg Intravenous Q12H [facility] Belen Medina MD      famotidine  20 mg Oral Daily [facility], DO      levothyroxine  75 mcg Oral Daily [facility], DO      melatonin  3 mg Oral HS [facility], DO      metoprolol tartrate  25 mg Oral BID [facility], DO      zinc sulfate  220 mg Oral Daily [facility], DO      Followed by   Erika Pruitt ON 7/22/2021] multivitamin-minerals  1 tablet Oral Daily [facility], DO      ondansetron  4 mg Intravenous Q4H PRN [facility], DO      remdesivir  100 mg Intravenous Q24H VICTORINA Alcala      ruxolitinib  10 mg Oral Every Other Day [facility], DO      saccharomyces boulardii  250 mg Oral BID VICTORINA Alcala      sodium bicarbonate  1,300 mg Oral BID after meals Angeline Swift,       vancomycin  125 mg Oral Q6H [facility] [place] [place]           PhysicalExamination:  Vitals:   Vitals:    07/18/21 1728 07/18/21 2200 07/19/21 0701 07/19/21 0800   BP: 124/79 145/74  161/87   Pulse:  72 76   Resp:  20     Temp:  98 1 °F (36 7 °C) 99 1 °F (37 3 °C)    TempSrc:  Oral Oral    SpO2:  91%  (!) 89%   Weight:       Height:         Body mass index is 35 39 kg/m²  Temp  Min: 98 °F (36 7 °C)  Max: 99 8 °F (37 7 °C)  IBW (Ideal Body Weight): 45 5 kg    SpO2: (!) 89 %,   SpO2 Activity: At Rest,   O2 Device: Nasal cannula    Gen: elderly, tired appearing, taciturn  Head: no masses, lesions  Eyes: anicteric, no conjunctival irritation  Oropharynx: no erythema, exudates, lesions   Lymph: no cervical, pre/post auricular, submental lymphadenopathy  Lungs: diminshed bs, no wheezes  Cardiac: rrr w/o mrg  Ab: soft, nontender  Ext: c/c/mild LE edema  Skin: warm, dry, no rashes, intact  Neuro: no focal neurological deficits       Diagnostic Data:  CBC:   Results from last 7 days   Lab Units 07/19/21  0637 07/18/21  0459 07/17/21  0529   WBC Thousand/uL 3 19* 2 68* 2 92*   HEMOGLOBIN g/dL 8 8* 8 7* 9 2*   HEMATOCRIT % 27 4* 27 3* 28 2*   PLATELETS Thousands/uL 227 216 227       CMP:   Results from last 7 days   Lab Units 07/19/21  0637 07/18/21  0459 07/17/21  0529   SODIUM mmol/L 140 139 133*   POTASSIUM mmol/L 3 6 3 1* 3 2*   CHLORIDE mmol/L 108 108 104   CO2 mmol/L 21 20* 21   BUN mg/dL 38* 34* 40*   CREATININE mg/dL 3 79* 3 58* 3 78*   CALCIUM mg/dL 7 8* 7 5* 7 6*   ALK PHOS U/L 44* 42* 46   ALT U/L 11* 9* 10*   AST U/L 43 41 40         Microbiology:  Results from last 7 days   Lab Units 07/15/21  2201 07/15/21  1151 07/15/21  1149   BLOOD CULTURE   --  No Growth at 72 hrs    No Growth at 72 hrs   --    URINE CULTURE   --   --  >100,000 cfu/ml    C DIFF TOXIN B BY PCR  Negative  --   --        ABG: No results found for: PHART, IJC9LLP, PO2ART, ZEB2AWL, X2BSCIUY, BEART, SOURCE    Imaging:   chest x-ray as highlighted above    Cardiac lab/EKG/telemetry/ECHO:   Results from last 7 days   Lab Units 07/15/21  1254   TROPONIN I ng/mL 0 04       Pierre Dacosta MD

## 2021-07-19 NOTE — RESPIRATORY THERAPY NOTE
RT Protocol Note  Pablo Carr 77 y o  female MRN: [id number]  Unit/Bed#: -01 Encounter: [id number]    Assessment    Principal Problem:    COVID-19 virus infection  Active Problems:    Chronic saddle pulmonary embolism ([facility] )    Obstructive uropathy    Polycythemia vera ([facility] )    Anemia in CKD (chronic kidney disease)    Acute renal failure superimposed on stage 5 chronic kidney disease, not on chronic dialysis ([facility] )    History of Clostridioides difficile colitis    Class 2 severe obesity due to excess calories with serious comorbidity and body mass index (BMI) of 35 0 to 35 9 in adult [facility])    Febrile illness    Acute respiratory failure with hypoxia ([facility])      Home Pulmonary Medications:         Past Medical History:   Diagnosis Date    Anxiety     Bowel obstruction (HCC)     Chronic kidney disease (CKD), stage IV (severe) (HCC)     stage IV    Chronic thrombosis of subclavian vein ([facility])     right    Circulation problem     Compression fracture of cervical spine ([facility])     Hernia of abdominal cavity     History of kidney problems     Hydronephrosis     Hypertension     IBS (irritable bowel syndrome)     Incontinence     Lung mass     Improving on PET/CT 1/2016    Polycythemia vera (HCC)     [facility] 2014    Shingles     Urinary tract infection      Social History     Socioeconomic History    Marital status:       Spouse name: None    Number of children: None    Years of education: None    Highest education level: None   Occupational History    None   Tobacco Use    Smoking status: Never Smoker    Smokeless tobacco: Never Used    Tobacco comment: n/a   Vaping Use    Vaping Use: Never used   Substance and Sexual Activity    Alcohol use: Not Currently     Comment: n/s    Drug use: Not Currently     Comment: n/a    Sexual activity: Not Currently     Partners: Male   Other Topics Concern    None   Social History Narrative    None     Social Determinants of Health     Financial Resource Strain:     Difficulty of Paying Living Expenses:    Food Insecurity:     Worried About Running Out of Food in the Last Year:     [address] in the Last Year:    Transportation Needs:     Lack of Transportation (Medical):  Lack of Transportation (Non-Medical):    Physical Activity:     Days of Exercise per Week:     Minutes of Exercise per Session:    Stress:     Feeling of Stress :    Social Connections:     Frequency of Communication with Friends and Family:     Frequency of Social Gatherings with Friends and Family:     Attends [employer] Services:     Active Member of Clubs or Organizations:     Attends Club or Organization Meetings:     Marital Status:    Intimate Partner Violence:     Fear of Current or Ex-Partner:     Emotionally Abused:     Physically Abused:     Sexually Abused:        Subjective         Objective    Physical Exam:   Assessment Type: Assess only  Respiratory Pattern: Normal  Chest Assessment: Chest expansion symmetrical  Bilateral Breath Sounds: Clear, Diminished    Vitals:  Blood pressure 161/87, pulse 76, temperature 99 1 °F (37 3 °C), temperature source Oral, resp  rate 20, height 5' (1 524 m), weight 82 2 kg (181 lb 3 5 oz), SpO2 (!) 89 %, not currently breastfeeding  Imaging and other studies: I have personally reviewed pertinent reports  Plan    Respiratory Plan: Discontinue Protocol        Resp Comments: (P) pt with covid + and  hx of lung mass placed on Mid flow 12LPM for hypoxemia  BS are clear    Albuterol MDi will be oredered for whz or SOB will continue to monitor

## 2021-07-19 NOTE — ASSESSMENT & PLAN NOTE
· In the setting of COVID infection, plan as per above  · C  diff culture negative  · Urine culture with mixed contaminant x4  · Blood cultures negative x2 at 72 hours  · Procal negative x2   Off abx

## 2021-07-20 LAB
ALBUMIN SERPL BCP-MCNC: 2.3 G/DL (ref 3.5–5)
ALP SERPL-CCNC: 49 U/L (ref 46–116)
ALT SERPL W P-5'-P-CCNC: 9 U/L (ref 12–78)
ANION GAP SERPL CALCULATED.3IONS-SCNC: 10 MMOL/L (ref 4–13)
AST SERPL W P-5'-P-CCNC: 44 U/L (ref 5–45)
BACTERIA BLD CULT: NORMAL
BACTERIA BLD CULT: NORMAL
BILIRUB SERPL-MCNC: 0.56 MG/DL (ref 0.2–1)
BUN SERPL-MCNC: 42 MG/DL (ref 5–25)
CALCIUM ALBUM COR SERPL-MCNC: 9.5 MG/DL (ref 8.3–10.1)
CALCIUM SERPL-MCNC: 8.1 MG/DL (ref 8.3–10.1)
CHLORIDE SERPL-SCNC: 108 MMOL/L (ref 100–108)
CO2 SERPL-SCNC: 20 MMOL/L (ref 21–32)
CREAT SERPL-MCNC: 3.67 MG/DL (ref 0.6–1.3)
CRP SERPL QL: 116 MG/L
D DIMER PPP FEU-MCNC: 1.63 UG/ML FEU
ERYTHROCYTE [DISTWIDTH] IN BLOOD BY AUTOMATED COUNT: 15.6 % (ref 11.6–15.1)
FERRITIN SERPL-MCNC: 2203 NG/ML (ref 8–388)
GFR SERPL CREATININE-BSD FRML MDRD: 11 ML/MIN/1.73SQ M
GLUCOSE SERPL-MCNC: 144 MG/DL (ref 65–140)
HCT VFR BLD AUTO: 26.5 % (ref 34.8–46.1)
HGB BLD-MCNC: 8.7 G/DL (ref 11.5–15.4)
MCH RBC QN AUTO: 29.8 PG (ref 26.8–34.3)
MCHC RBC AUTO-ENTMCNC: 32.8 G/DL (ref 31.4–37.4)
MCV RBC AUTO: 91 FL (ref 82–98)
PLATELET # BLD AUTO: 234 THOUSANDS/UL (ref 149–390)
PMV BLD AUTO: 9.7 FL (ref 8.9–12.7)
POTASSIUM SERPL-SCNC: 3.5 MMOL/L (ref 3.5–5.3)
PROCALCITONIN SERPL-MCNC: 0.17 NG/ML
PROT SERPL-MCNC: 6.4 G/DL (ref 6.4–8.2)
RBC # BLD AUTO: 2.92 MILLION/UL (ref 3.81–5.12)
SODIUM SERPL-SCNC: 138 MMOL/L (ref 136–145)
WBC # BLD AUTO: 2.73 THOUSAND/UL (ref 4.31–10.16)

## 2021-07-20 PROCEDURE — 99232 SBSQ HOSP IP/OBS MODERATE 35: CPT | Performed by: INTERNAL MEDICINE

## 2021-07-20 PROCEDURE — 99233 SBSQ HOSP IP/OBS HIGH 50: CPT | Performed by: INTERNAL MEDICINE

## 2021-07-20 PROCEDURE — 94760 N-INVAS EAR/PLS OXIMETRY 1: CPT

## 2021-07-20 PROCEDURE — 86140 C-REACTIVE PROTEIN: CPT | Performed by: PHYSICIAN ASSISTANT

## 2021-07-20 PROCEDURE — 85379 FIBRIN DEGRADATION QUANT: CPT | Performed by: PHYSICIAN ASSISTANT

## 2021-07-20 PROCEDURE — 80053 COMPREHEN METABOLIC PANEL: CPT | Performed by: PHYSICIAN ASSISTANT

## 2021-07-20 PROCEDURE — 85027 COMPLETE CBC AUTOMATED: CPT | Performed by: PHYSICIAN ASSISTANT

## 2021-07-20 PROCEDURE — 82728 ASSAY OF FERRITIN: CPT | Performed by: PHYSICIAN ASSISTANT

## 2021-07-20 PROCEDURE — 84145 PROCALCITONIN (PCT): CPT | Performed by: NURSE PRACTITIONER

## 2021-07-20 PROCEDURE — 97116 GAIT TRAINING THERAPY: CPT

## 2021-07-20 PROCEDURE — 97110 THERAPEUTIC EXERCISES: CPT

## 2021-07-20 RX ADMIN — Medication 250 MG: at 17:00

## 2021-07-20 RX ADMIN — OXYCODONE HYDROCHLORIDE AND ACETAMINOPHEN 1000 MG: 500 TABLET ORAL at 20:52

## 2021-07-20 RX ADMIN — METOPROLOL TARTRATE 25 MG: 25 TABLET, FILM COATED ORAL at 08:44

## 2021-07-20 RX ADMIN — DEXAMETHASONE SODIUM PHOSPHATE 8 MG: 4 INJECTION INTRA-ARTICULAR; INTRALESIONAL; INTRAMUSCULAR; INTRAVENOUS; SOFT TISSUE at 08:44

## 2021-07-20 RX ADMIN — CITALOPRAM HYDROBROMIDE 20 MG: 20 TABLET ORAL at 08:50

## 2021-07-20 RX ADMIN — Medication 2000 UNITS: at 08:44

## 2021-07-20 RX ADMIN — CALCITRIOL CAPSULES 0.25 MCG 0.25 MCG: 0.25 CAPSULE ORAL at 08:49

## 2021-07-20 RX ADMIN — LEVOTHYROXINE SODIUM 75 MCG: 75 TABLET ORAL at 05:30

## 2021-07-20 RX ADMIN — FAMOTIDINE 20 MG: 20 TABLET ORAL at 08:44

## 2021-07-20 RX ADMIN — MELATONIN TAB 3 MG 3 MG: 3 TAB at 20:52

## 2021-07-20 RX ADMIN — SODIUM BICARBONATE 650 MG TABLET 1300 MG: at 16:58

## 2021-07-20 RX ADMIN — REMDESIVIR 100 MG: 100 INJECTION, POWDER, LYOPHILIZED, FOR SOLUTION INTRAVENOUS at 16:06

## 2021-07-20 RX ADMIN — APIXABAN 2.5 MG: 2.5 TABLET, FILM COATED ORAL at 08:44

## 2021-07-20 RX ADMIN — DEXAMETHASONE SODIUM PHOSPHATE 8 MG: 4 INJECTION INTRA-ARTICULAR; INTRALESIONAL; INTRAMUSCULAR; INTRAVENOUS; SOFT TISSUE at 20:52

## 2021-07-20 RX ADMIN — OXYCODONE HYDROCHLORIDE AND ACETAMINOPHEN 1000 MG: 500 TABLET ORAL at 08:46

## 2021-07-20 RX ADMIN — ATORVASTATIN CALCIUM 40 MG: 40 TABLET, FILM COATED ORAL at 17:00

## 2021-07-20 RX ADMIN — Medication 250 MG: at 08:49

## 2021-07-20 RX ADMIN — METOPROLOL TARTRATE 25 MG: 25 TABLET, FILM COATED ORAL at 17:00

## 2021-07-20 RX ADMIN — SODIUM BICARBONATE 650 MG TABLET 1300 MG: at 08:49

## 2021-07-20 RX ADMIN — RUXOLITINIB 10 MG: 10 TABLET ORAL at 14:16

## 2021-07-20 RX ADMIN — ZINC SULFATE 220 MG (50 MG) CAPSULE 220 MG: CAPSULE at 08:44

## 2021-07-20 NOTE — ASSESSMENT & PLAN NOTE
· Patient presented with fever, generalized weakness, diarrhea decreasing oral intake and difficulty with ambulation for 1 week per admission record review  · COVID-19 positive   · Pt is not vaccinated   · Patient with increasing oxygen requirements, now on Moderate Pathway  · Check blood type  · CBC and CMP daily  · CRP, D-dimer, and ferritin up-trending - continue to trend  · Abx were discontinued prior given procal negative x2  · Continue vmtain D, vitamin C, zinc, atorvastatin, and famotidine per protocol  · Cont on IV dexamethasone 8mg bid, finished course of remdesivir  · Pulm following  · Not candidate for acemtra  · Pulm consuled - will appreciate their recommendations regarding tocilizumab  · CXR on admission question mild bilateral groundglass opacity in the upper lungs  · S/p dose of IV Lasix 7/18 due to possible volume overload per Nephrology   · Upgrade to stepdown - low threshold to involve critical care   · Respiratory protocol

## 2021-07-20 NOTE — ASSESSMENT & PLAN NOTE
· In the setting of COVID infection, plan as per above  · C  diff culture negative  · Urine culture with mixed contaminant x4  · Blood cultures negative x2 a  · Procal negative x2   Off abx

## 2021-07-20 NOTE — ASSESSMENT & PLAN NOTE
Lab Results   Component Value Date    EGFR 11 07/20/2021    EGFR 11 07/19/2021    EGFR 12 07/18/2021    CREATININE 3 67 (H) 07/20/2021    CREATININE 3 79 (H) 07/19/2021    CREATININE 3 58 (H) 07/18/2021     · Appreciate ongoing Nephrology recs - suspected prerenal azotemia given poor intake and diarrhea +/- component of COVID-19  · Cr baseline 3 4-3 8, follows with Dr Demetri Greer  · Initially received IVF followed by one time dose of lasix 20mg IV yesterday  · Nephrology following

## 2021-07-20 NOTE — PROGRESS NOTES
[address] [facility]  Progress Note - Monica Conklin [birth year], 77 y o  female MRN: [id number]  Unit/Bed#: -01 Encounter: [id number]  Primary Care Provider: Mauricio Hills MD   Date and time admitted to hospital: 7/15/2021 10:52 AM    * COVID-19 virus infection  Assessment & Plan  · Patient presented with fever, generalized weakness, diarrhea decreasing oral intake and difficulty with ambulation for 1 week per admission record review  · COVID-19 positive   · Pt is not vaccinated   · Patient with increasing oxygen requirements, now on Moderate Pathway  · Check blood type  · CBC and CMP daily  · CRP, D-dimer, and ferritin up-trending - continue to trend  · Abx were discontinued prior given procal negative x2  · Continue vmtain D, vitamin C, zinc, atorvastatin, and famotidine per protocol  · Cont on IV dexamethasone 8mg bid, finished course of remdesivir  · Pulm following  · Not candidate for acemtra  · Pulm consuled - will appreciate their recommendations regarding tocilizumab  · CXR on admission question mild bilateral groundglass opacity in the upper lungs  · S/p dose of IV Lasix 7/18 due to possible volume overload per Nephrology   · Upgrade to stepdown - low threshold to involve critical care   · Respiratory protocol         Acute respiratory failure with hypoxia ([address] )  Assessment & Plan  · In the setting of COVID-19 as above  · Plan as per above  · Remains on High flow    Acute renal failure superimposed on stage 5 chronic kidney disease, not on chronic dialysis [facility])  Assessment & Plan  Lab Results   Component Value Date    EGFR 11 07/20/2021    EGFR 11 07/19/2021    EGFR 12 07/18/2021    CREATININE 3 67 (H) 07/20/2021    CREATININE 3 79 (H) 07/19/2021    CREATININE 3 58 (H) 07/18/2021     · Appreciate ongoing Nephrology recs - suspected prerenal azotemia given poor intake and diarrhea +/- component of COVID-19  · Cr baseline 3 4-3 8, follows with Dr Kali Ray  · Initially received IVF followed by one time dose of lasix 20mg IV yesterday  · Nephrology following     Febrile illness  Assessment & Plan  · In the setting of COVID infection, plan as per above  · C  diff culture negative  · Urine culture with mixed contaminant x4  · Blood cultures negative x2 a  · Procal negative x2  Off abx    Class 2 severe obesity due to excess calories with serious comorbidity and body mass index (BMI) of 35 0 to 35 9 in adult [facility])  Assessment & Plan  Body mass index is 35 39 kg/m²  · Nutrition consult once improved from above    History of Clostridioides difficile colitis  Assessment & Plan  · Noted history of C  Diff  · C  Diff testing was negative on admission, however patient did received IV abx  Will continue PO Vanco for 3 days post last dose of IV abx, then can likely discontinue given negative C  Diff test  Suspect diarrhea may be related to COVID-19    Anemia in CKD (chronic kidney disease)  Assessment & Plan  Lab Results   Component Value Date    HGB 8 7 (L) 07/20/2021    HGB 8 8 (L) 07/19/2021    HGB 8 7 (L) 07/18/2021   · Stable  · Monitor     Polycythemia vera ([facility] [address] )  Assessment & Plan  · Patient follows up with outpatient Hematology Oncology, currently on Jakafi     Obstructive uropathy  Assessment & Plan  · S/p ileostomy for nonfunctioning bladder and chronic hydro  · OP urology follow up    Chronic saddle pulmonary embolism (HCC)  Assessment & Plan  · Continue Eliquis      VTE Pharmacologic Prophylaxis:   Pharmacologic: Apixaban (Eliquis)  Mechanical VTE Prophylaxis in Place: Yes    Patient Centered Rounds: I have performed bedside rounds with nursing staff today  Discussions with Specialists or Other Care Team Provider:     Education and Discussions with Family / Patient:  Patient and called her family son and dtr-in-law  Time Spent for Care: 30 minutes  More than 50% of total time spent on counseling and coordination of care as described above      Current Length of Stay: 5 day(s)    Current Patient Status: Inpatient   Certification Statement: The patient will continue to require additional inpatient hospital stay due to COVID pna    Discharge Plan:     Code Status: Level 1 - Full Code      Subjective:   Sitting up in chair, sad because now her mentally challenged son has just been admitted with covid pna at an outside hospital   Reports of improved dyspnea    Objective:     Vitals:   Temp (24hrs), Av 5 °F (36 9 °C), Min:97 5 °F (36 4 °C), Max:99 4 °F (37 4 °C)    Temp:  [97 5 °F (36 4 °C)-99 4 °F (37 4 °C)] 97 5 °F (36 4 °C)  HR:  [64-76] 66  Resp:  [20-22] 21  BP: (114-171)/(65-86) 114/70  SpO2:  [92 %-96 %] 94 %  Body mass index is 35 39 kg/m²  Input and Output Summary (last 24 hours): Intake/Output Summary (Last 24 hours) at 2021 191  Last data filed at 2021 1659  Gross per 24 hour   Intake 250 ml   Output 1300 ml   Net -1050 ml       Physical Exam:     Physical Exam  Cardiovascular:      Rate and Rhythm: Normal rate and regular rhythm  Pulses: Normal pulses  Heart sounds: Normal heart sounds  Pulmonary:      Effort: Pulmonary effort is normal  No respiratory distress  Breath sounds: No wheezing or rales  Comments: Diminished bs  Abdominal:      General: Abdomen is flat  Bowel sounds are normal  There is no distension  Tenderness: There is no abdominal tenderness  There is no guarding  Musculoskeletal:         General: Normal range of motion  Cervical back: Normal range of motion and neck supple  Right lower leg: No edema  Left lower leg: No edema  Skin:     General: Skin is warm and dry  Neurological:      General: No focal deficit present  Mental Status: She is alert and oriented to person, place, and time  Mental status is at baseline  Cranial Nerves: No cranial nerve deficit  Motor: No weakness             Additional Data:     Labs:    Results from last 7 days   Lab Units 21  8529 07/15/21  1152   WBC Thousand/uL 2 73* 2 92*   HEMOGLOBIN g/dL 8 7* 9 6*   HEMATOCRIT % 26 5* 29 9*   PLATELETS Thousands/uL 234 220   NEUTROS PCT %  --  79*   LYMPHS PCT %  --  12*   MONOS PCT %  --  8   EOS PCT %  --  0     Results from last 7 days   Lab Units 07/20/21  0530   SODIUM mmol/L 138   POTASSIUM mmol/L 3 5   CHLORIDE mmol/L 108   CO2 mmol/L 20*   BUN mg/dL 42*   CREATININE mg/dL 3 67*   ANION GAP mmol/L 10   CALCIUM mg/dL 8 1*   ALBUMIN g/dL 2 3*   TOTAL BILIRUBIN mg/dL 0 56   ALK PHOS U/L 49   ALT U/L 9*   AST U/L 44   GLUCOSE RANDOM mg/dL 144*                 Results from last 7 days   Lab Units 07/20/21  0530 07/19/21  0637 07/15/21  1152   LACTIC ACID mmol/L  --   --  1 1   PROCALCITONIN ng/ml 0 17 0 11 0 12           * I Have Reviewed All Lab Data Listed Above  * Additional Pertinent Lab Tests Reviewed: All Labs Within Last 24 Hours Reviewed    Imaging:    Imaging Reports Reviewed Today Include:   Imaging Personally Reviewed by Myself Includes:      Recent Cultures (last 7 days):     Results from last 7 days   Lab Units 07/15/21  2201 07/15/21  1151 07/15/21  1149   BLOOD CULTURE   --  No Growth After 5 Days  No Growth After 5 Days    --    URINE CULTURE   --   --  >100,000 cfu/ml    C DIFF TOXIN B BY PCR  Negative  --   --        Last 24 Hours Medication List:   Current Facility-Administered Medications   Medication Dose Route Frequency Provider Last Rate    acetaminophen  650 mg Oral Q4H PRN Annette Leggett, DO      albuterol  2 puff Inhalation Q4H PRN Suzy Landa MD      apixaban  2 5 mg Oral Daily Annette Leggett, DO      ascorbic acid  1,000 mg Oral Q12H [address] Annette Leggett, DO      atorvastatin  40 mg Oral Daily With ahoyDoc, DO      calcitriol  0 25 mcg Oral Daily Annette Leggett, DO      cholecalciferol  2,000 Units Oral Daily Annette Leggett, DO      citalopram  20 mg Oral Daily Annette Leggett, DO      dexamethasone  8 mg Intravenous Q12H [address] Hussein Crain MD      famotidine  20 mg Oral Daily Patrizia Moffett DO      levothyroxine  75 mcg Oral Daily Patrizia Moffett DO      melatonin  3 mg Oral HS Patrizia Moffett DO      metoprolol tartrate  25 mg Oral BID Patrizia Moffett DO      zinc sulfate  220 mg Oral Daily Patrizia Moffett DO      Followed by   Mortimer Deep ON 7/22/2021] multivitamin-minerals  1 tablet Oral Daily Patrizia Moffett DO      ondansetron  4 mg Intravenous Q4H PRN Patrizia Moffett DO      ruxolitinib  10 mg Oral Every Other Day Patrizia Moffett DO      saccharomyces boulardii  250 mg Oral BID VICTORINA Alcaraz      sodium bicarbonate  1,300 mg Oral BID after meals Hilda Mena DO          Today, Patient Was Seen By: Magaly Clemens DO    ** Please Note: Dictation voice to text software may have been used in the creation of this document   **

## 2021-07-20 NOTE — PHYSICAL THERAPY NOTE
Physical Therapy Treatment Note    Patient's Name: Rory Boeck  :        21 1430   PT Last Visit   PT Visit Date 21   Note Type   Note Type Treatment   Pain Assessment   Pain Assessment Tool Pain Assessment not indicated - pt denies pain   Restrictions/Precautions   Weight Bearing Precautions Per Order No   Other Precautions Contact/isolation; Airborne/isolation;Multiple lines;Telemetry;O2;Fall Risk;Cognitive  (COVID(+), HFNC)   General   Chart Reviewed Yes   Family/Caregiver Present No   Cognition   Overall Cognitive Status Impaired   Attention Attends with cues to redirect   Orientation Level Oriented X4   Memory Decreased recall of precautions   Following Commands Follows one step commands with increased time or repetition   Comments Pt with flat affect- agreeable to work with therapy   Transfers   Sit to Stand 4  Minimal assistance   Additional items Assist x 1;Verbal cues; Increased time required   Stand to Sit 4  Minimal assistance   Additional items Assist x 1; Increased time required;Verbal cues   Additional Comments Transfers with RW  VCs for proper hand placement with descent to chair   Ambulation/Elevation   Gait pattern Excessively slow; Short stride;Decreased foot clearance   Gait Assistance 4  Minimal assist   Additional items Assist x 1;Verbal cues   Assistive Device Rolling walker   Distance 5ft advance/retreat x2 with seated rest break in between    Balance   Static Sitting Fair +   Dynamic Sitting Fair   Static Standing Fair -   Dynamic Standing Poor +   Ambulatory Poor +   Activity Tolerance   Activity Tolerance Patient limited by fatigue   Nurse Made Aware ok to see per RN   Exercises   Knee AROM Long Arc Quad Sitting;10 reps;AROM; Bilateral   Assessment   Prognosis Good   Problem List Decreased strength; Impaired balance;Decreased endurance;Decreased mobility; Decreased cognition   Assessment Pt seen for PT session today with a focus on functional transfers, gait, endurance, and LE strengthening  Pt is making slow, but steady progress towards mobility goals  At this time, pt is able to perform transfers and ambulate short distances with RW with Nathan, limited mostly at this time by endurance deficits  Pt requires increased time to complete all mobility tasks with occasional VCs for safety and sequencing  Educated pt on seated therex to perform 3x/day- pt demonstrating and verbalizing understanding  Pt would benefit from continued acute skilled PT to address above deficits  Continuing to recommend rehab upon d/c     Barriers to Discharge Inaccessible home environment;Decreased caregiver support   Goals   Patient Goals to rest   STG Expiration Date 08/02/21   PT Treatment Day 1   Plan   Treatment/Interventions Functional transfer training;LE strengthening/ROM; Elevations; Therapeutic exercise; Endurance training;Equipment eval/education; Bed mobility;Gait training;Spoke to nursing;Spoke to case management   Progress Progressing toward goals   PT Frequency Other (Comment)  (3-5x/wk)   Recommendation   PT Discharge Recommendation Post acute rehabilitation services   PT - OK to Discharge Yes  (to rehab, when medically appropriate)   Malorie 8 in Bed Without Bedrails 2   Lying on Back to Sitting on Edge of Flat Bed 2   Moving Bed to Chair 3   Standing Up From Chair 3   Walk in Room 3   Climb 3-5 Stairs 2   Basic Mobility Inpatient Raw Score 15   Basic Mobility Standardized Score 36 97       Governor Olp, PT, DPT

## 2021-07-20 NOTE — PROGRESS NOTES
Progress Note - Pulmonary   Fredonia Nicko 77 y o  female MRN: [id number]  Unit/Bed#: -01 Encounter: [id number]      Assessment and Plan:     Acute hypoxic respiratory failure secondary to COVID-19 PNA    - unvaccinated   - + on COVID 7/15   - c/w Decadron 8mg IV bid   - Remdesivir x 5 days total   - maintain goal saturation 90-92%   - weaned [place]iflow to 40L, 65%   - continued goals of care discussion   - encouraged her to turn side to side- her ileal conduit precludes her from lying on her belly    - s/p IV lasix 40mg on 7/19  - Ferritin increasing, D-dimer 1 6, pCT 0 17  - would avoid Acemtra given hx SBO, moreover, CRP not extremely high       LUANN on CKD Stage 5   - nephrology following     Hx C  Diff   Chronic PE   - on Eliquis     Polcycythemia vera   - on Jakafi, follows with Hem/Onc       Subjective:   No new complaints  No Cough, no chest pain  Review of Systems   All other systems reviewed and are negative  Objective:     Vitals:    07/20/21 0208 07/20/21 0236 07/20/21 0308 07/20/21 0729   BP: 124/74  148/86    BP Location:       Pulse: 68  74    Resp: 21      Temp:    98 1 °F (36 7 °C)   TempSrc:    Oral   SpO2: 96% 96% 93% 92%   Weight:       Height:               Intake/Output Summary (Last 24 hours) at 7/20/2021 1028  Last data filed at 7/20/2021 0401  Gross per 24 hour   Intake 280 ml   Output 1250 ml   Net -970 ml         Physical Exam  Vitals reviewed  Constitutional:       Appearance: She is obese  She is ill-appearing  HENT:      Head: Normocephalic  Nose: Nose normal       Mouth/Throat:      Mouth: Mucous membranes are moist    Eyes:      Pupils: Pupils are equal, round, and reactive to light  Cardiovascular:      Rate and Rhythm: Normal rate and regular rhythm  Pulses: Normal pulses  Heart sounds: Normal heart sounds  Pulmonary:      Comments: Diminished breath sounds, poor inspiratory effort   Abdominal:      General: Abdomen is flat        Palpations: Abdomen is soft  Comments: Ileal conduit intact    Musculoskeletal:         General: Normal range of motion  Skin:     General: Skin is warm and dry  Neurological:      General: No focal deficit present  Mental Status: She is alert and oriented to person, place, and time  Labs: I have personally reviewed pertinent lab results  Results from last 7 days   Lab Units 07/20/21  0530 07/19/21  0637 07/18/21  0459 07/15/21  1152   WBC Thousand/uL 2 73* 3 19* 2 68* 2 92*   HEMOGLOBIN g/dL 8 7* 8 8* 8 7* 9 6*   HEMATOCRIT % 26 5* 27 4* 27 3* 29 9*   PLATELETS Thousands/uL 234 227 216 220   NEUTROS PCT %  --   --   --  79*   MONOS PCT %  --   --   --  8      Results from last 7 days   Lab Units 07/20/21  0530 07/19/21  0637 07/18/21  0459   POTASSIUM mmol/L 3 5 3 6 3 1*   CHLORIDE mmol/L 108 108 108   CO2 mmol/L 20* 21 20*   BUN mg/dL 42* 38* 34*   CREATININE mg/dL 3 67* 3 79* 3 58*   CALCIUM mg/dL 8 1* 7 8* 7 5*   ALK PHOS U/L 49 44* 42*   ALT U/L 9* 11* 9*   AST U/L 44 43 41         Results from last 7 days   Lab Units 07/18/21  0459   PHOSPHORUS mg/dL 2 8          Results from last 7 days   Lab Units 07/15/21  1152   LACTIC ACID mmol/L 1 1     0   Lab Value Date/Time    TROPONINI 0 04 07/15/2021 1254    TROPONINI <0 02 05/08/2021 1318    TROPONINI <0 02 07/15/2020 1016    TROPONINI <0 02 07/15/2020 0716    TROPONINI <0 02 05/23/2019 1045    TROPONINI <0 02 01/11/2019 1242    TROPONINI <0 02 10/06/2018 2111       Microbiology:  As above     Imaging and other studies: I have personally reviewed pertinent reports     and I have personally reviewed pertinent films in PACS        Jesus Chavez MD   Pulmonary & Critical Care Fellow  [facility]

## 2021-07-20 NOTE — PROGRESS NOTES
NEPHROLOGY PROGRESS NOTE   Eliu Khanna 77 y o  female MRN: [id number]  Unit/Bed#: -01 Encounter: [id number]      ASSESSMENT & PLAN    1  LUANN (POA) on CKD likely pre renal azotemia that has progressed to ATN plus minus component of COVID-19 on stage 5 chronic kidney disease  ? Creatinine was as high as 4 09 and now slightly improved around 3 7  ? Consent for dialysis was obtained by my colleague  ? Baseline creatinine has been in the mid threes  ? Electrolytes and acid-base status are stable  ? Will monitor closely for the initiation of renal replacement therapy  ? Has had a negative fluid balance with poor p o  Intake will hold diuretics for right now  ? Has left-sided hydronephrosis with the recent history of left-sided nephrostomy tube and solitary kidney function with left renal atrophy     2  Electrolytes/Acid Base  ? Severe metabolic acidosis-continue to monitor-currently on sodium bicarbonate     3  Blood Pressure  ? Blood pressures are currently stable  ? Avoid hypotension  ? Currently on metoprolol 25 mg 2 times daily     4  Anemia of CKD  ? Hemoglobins are stable, platelet count stable will monitor     5  CKD-BMD  ? Continue calcitriol for secondary hyperparathyroidism on vitamin D3 as well 2000 units  ? Monitor phosphorus     6  Clinical Course/CV Risk Reduction/Health maintenance/communication  ?  COVID-19 pneumonia-ongoing treatment per primary    DISCUSSION    Ongoing treatment for COVID pneumonia per Pulmonary   Weaning high-flow as tolerated  Urine output and renal function stable    SUBJECTIVE:    Patient was seen today resting comfortably in bed  No acute complaints    OBJECTIVE:  Current Weight: Weight - Scale: 82 2 kg (181 lb 3 5 oz)  @  Vitals:    07/20/21 0729 07/20/21 0810 07/20/21 0910 07/20/21 1010   BP:  168/83 148/77 159/78   Pulse:  72 76 66   Resp:       Temp: 98 1 °F (36 7 °C)      TempSrc: Oral      SpO2: 92% 93% 94% 94%   Weight:       Height:           Intake/Output Summary (Last 24 hours) at 7/20/2021 1148  Last data filed at 7/20/2021 0401  Gross per 24 hour   Intake 280 ml   Output 1250 ml   Net -970 ml     Weight (last 2 days)     None          General: conscious, cooperative, in no acute distress  Eyes: conjunctivae pink, anicteric sclerae  ENT: lips and mucous membranes moist  Neck: supple, no JVD  Chest:  High-flow nasal cannula in place  CVS: normal heart rate, no friction rub  Abdomen: soft, non-tender, non-distended, normoactive bowel sounds  Extremities: no edema of both legs  Skin: no rash  Neuro: awake, alert, oriented but fatigued      Medications:    Current Facility-Administered Medications:     acetaminophen (TYLENOL) tablet 650 mg, 650 mg, Oral, Q4H PRN, Noemí Carter DO    albuterol (PROVENTIL HFA,VENTOLIN HFA) inhaler 2 puff, 2 puff, Inhalation, Q4H PRN, Garland Dillon MD    apixaban [address]) tablet 2 5 mg, 2 5 mg, Oral, Daily, Noemí Carter DO, 2 5 mg at 07/20/21 0844    ascorbic acid (VITAMIN C) tablet 1,000 mg, 1,000 mg, Oral, Q12H [address], Noemí Carter DO, 1,000 mg at 07/20/21 0846    atorvastatin (LIPITOR) tablet 40 mg, 40 mg, Oral, Daily With Brent Fossa, , 40 mg at 07/19/21 1802    calcitriol (ROCALTROL) capsule 0 25 mcg, 0 25 mcg, Oral, Daily, Noemí Carter DO, 0 25 mcg at 07/20/21 0849    cholecalciferol (VITAMIN D3) tablet 2,000 Units, 2,000 Units, Oral, Daily, Noemí Carter DO, 2,000 Units at 07/20/21 0844    citalopram (CeleXA) tablet 20 mg, 20 mg, Oral, Daily, Noemí Carter DO, 20 mg at 07/20/21 0850    dexamethasone (DECADRON) injection 8 mg, 8 mg, Intravenous, Q12H [address], Marilu Daley MD, 8 mg at 07/20/21 0844    famotidine (PEPCID) tablet 20 mg, 20 mg, Oral, Daily, Noemí Carter DO, 20 mg at 07/20/21 0844    levothyroxine tablet 75 mcg, 75 mcg, Oral, Daily, Noemí Carter DO, 75 mcg at 07/20/21 0530    melatonin tablet 3 mg, 3 mg, Oral, HS, Noemí Carter DO, 3 mg at 07/19/21 2226    metoprolol tartrate (LOPRESSOR) tablet 25 mg, 25 mg, Oral, BID, Fredis Cline DO, 25 mg at 07/20/21 0844    zinc sulfate (ZINCATE) capsule 220 mg, 220 mg, Oral, Daily, 220 mg at 07/20/21 0844 **FOLLOWED BY** [START ON 7/22/2021] multivitamin-minerals (CENTRUM ADULTS) tablet 1 tablet, 1 tablet, Oral, Daily, Fredis Cline DO    [facility]etron [facility]) injection 4 mg, 4 mg, Intravenous, Q4H PRN, Fredis Cline DO, 4 mg at 07/17/21 0845    [COMPLETED] remdesivir (Veklury) 200 mg in sodium chloride 0 9 % 250 mL IVPB, 200 mg, Intravenous, Q24H, Held at 07/16/21 1820 **FOLLOWED BY** remdesivir (Veklury) 100 mg in sodium chloride 0 9 % 250 mL IVPB, 100 mg, Intravenous, Q24H, VICTORINA Agee, Stopped at 07/19/21 1528    ruxolitinib (JAKAFI) tablet 10 mg, 10 mg, Oral, Every Other Day, Fredis Cline DO, 10 mg at 07/18/21 9134    saccharomyces boulardii (FLORASTOR) capsule 250 mg, 250 mg, Oral, BID, VICTORINA Gil, 250 mg at 07/20/21 6523    sodium bicarbonate tablet 1,300 mg, 1,300 mg, Oral, BID after meals, Abel Swift DO, 1,300 mg at 07/20/21 0849    Invasive Devices:      Lab Results:   Results from last 7 days   Lab Units 07/20/21  0530 07/19/21  0637 07/18/21  0459 07/17/21  0529 07/16/21  2133 07/15/21  1152 07/15/21  1151 07/15/21  1149   WBC Thousand/uL 2 73* 3 19* 2 68* 2 92*  --  2 92*  --   --    HEMOGLOBIN g/dL 8 7* 8 8* 8 7* 9 2*  --  9 6*  --   --    HEMATOCRIT % 26 5* 27 4* 27 3* 28 2*  --  29 9*  --   --    PLATELETS Thousands/uL 234 227 216 227  --  220  --   --    POTASSIUM mmol/L 3 5 3 6 3 1* 3 2* 3 3* 3 6  --   --    CHLORIDE mmol/L 108 108 108 104 107 110*  --   --    CO2 mmol/L 20* 21 20* 21 15* 13*  --   --    BUN mg/dL 42* 38* 34* 40* 43* 44*  --   --    CREATININE mg/dL 3 67* 3 79* 3 58* 3 78* 3 89* 4 09*  --   --    CALCIUM mg/dL 8 1* 7 8* 7 5* 7 6* 8 0* 8 7  --   --    PHOSPHORUS mg/dL  --   --  2 8  --   --   --   --   --    ALK PHOS U/L 49 44* 42* 46 48 56  --   --    ALT U/L 9* 11* 9* 10* 12 13  --   --    AST U/L 44 43 41 40 50* 39  --   --    BLOOD CULTURE   --   --   --   --   --   --  No Growth After 4 Days  No Growth After 4 Days  --    LEUKOCYTES UA   --   --   --   --   --   --   --  Large*   BLOOD UA   --   --   --   --   --   --   --  Large*         Portions of the record may have been created with voice recognition software  Occasional wrong word or "sound a like" substitutions may have occurred due to the inherent limitations of voice recognition software  Read the chart carefully and recognize, using context, where substitutions have occurred  If you have any questions, please contact the dictating provider

## 2021-07-20 NOTE — PLAN OF CARE
Problem: PHYSICAL THERAPY ADULT  Goal: Performs mobility at highest level of function for planned discharge setting  See evaluation for individualized goals  Description: Treatment/Interventions: Functional transfer training, LE strengthening/ROM, Therapeutic exercise, Elevations, Endurance training, Equipment eval/education, Bed mobility, Gait training, Spoke to nursing, Spoke to case management          See flowsheet documentation for full assessment, interventions and recommendations  Outcome: Progressing  Note: Prognosis: Good  Problem List: Decreased strength, Impaired balance, Decreased endurance, Decreased mobility, Decreased cognition  Assessment: Pt seen for PT session today with a focus on functional transfers, gait, endurance, and LE strengthening  Pt is making slow, but steady progress towards mobility goals  At this time, pt is able to perform transfers and ambulate short distances with RW with Nathan, limited mostly at this time by endurance deficits  Pt requires increased time to complete all mobility tasks with occasional VCs for safety and sequencing  Educated pt on seated therex to perform 3x/day- pt demonstrating and verbalizing understanding  Pt would benefit from continued acute skilled PT to address above deficits  Continuing to recommend rehab upon d/c    Barriers to Discharge: Inaccessible home environment, Decreased caregiver support        PT Discharge Recommendation: Post acute rehabilitation services     PT - OK to Discharge: Yes (to rehab, when medically appropriate)    See flowsheet documentation for full assessment

## 2021-07-20 NOTE — UTILIZATION REVIEW
Continued Stay Review    Date: 7-19-21                       Current Patient Class: inpatient Current Level of Care: med surg    HPI:76 y o  female initially admitted on 9- 15-21  covid positive with acute hypoxic respiratory failure, diarrhea    Patient is not vaccinated  Assessment/Plan:     Pulmonology consult completed  Iv solumedrol started with oxygen via high flow nasal canula  Continue iv remdesivir  Iv antibiotics discontinued as procalcitonin negative  Nephrology following for possible volume overload  1 dose of iv lasix given  Continue isolation  C diff is negative  Continue po vanco for 3 days after last dose of iv antibiotics  Consult Pulmonology   80-year-old woman with multiple comorbidities who presented to the emergency room due to fatigue, diarrhea found to have COVID-19 pneumonia who was had worsening of her underlying condition with development of acute hypoxic respiratory failure necessitating mid flow nasal cannula      1    COVID-19 pneumonia in unvaccinated patient  ·  would continue remdesivir to complete 5 day course   · Continue other elements of COVID-19 pathway with vitamins   · Have started Decadron 8 mg b i d  · At this point,  I do not recommend Actemra given presentation with diarrhea and also history of bowel perforation  Risk appears to outweigh potential benefit   · Continue other supportive care regarding chronic medical conditions  ·  would consider palliative care referral given patient's advanced age, comorbidities , 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, and overall potential  for a poor response to therapy he  2   acute hypoxic respiratory failure secondary to above  ·  continue supplemental oxygen to maintain saturations greater 90% the   ·  diuresis/ dialysis per Nephrology  3   History of saddle pulmonary embolism  -continue Eliquis    Vital Signs:     Date/  Time  Temp  Pulse  Resp  BP  MAP (mmHg)  SpO2   O2 Flow Rate (L/min)  Nasal Cannula O2 Flow Rate (L/min)  O2 Device  O2 Interface Device    07/19/21 2330  --  --  --  --  --  94 %   --  --  --  HFNC prongs    07/19/21 2008  99 4 °F (37 4 °C)  66  20  128/80  109  94 %   --  --  --  --    07/19/21 1944  --  --  --  --  --  96 %   --  --  --  HFNC prongs    07/19/21 1915  --  70  --  171/86  Abnormal   119  95 %   --  --  --  --    07/19/21 1814  --  --  --  164/82  --  --   --  --  --  --    07/19/21 1800  --  76  --  --  --  94 %   --  --  --  --    07/19/21 1700  --  70  --  --  --  95 %   --  --  --  --    07/19/21 1615  --  --  20  --  --  94 %   45 L/min  --  High flow nasal cannula  HFNC prongs    07/19/21 1600  --  74  --  --  --  96 %   --  --  --  --    07/19/21 1545  --  74  20  119/67  95  94 %   45 L/min  --  High flow nasal cannula  --    07/19/21 1500  --  74  --  --  --  96 %   --  --  --  --    07/19/21 1415  --  78  --  140/72  102  98 %   --  --  --  --    07/19/21 1400  --  78  --  --  --  96 %   --  --  --  --    07/19/21 1315  --  80  --  163/81  117  99 %   --  --  --  --    07/19/21 1300  --  72  --  --  --  100 %   --  --  --  --    07/19/21 1226  --  --  --  --  --  97 %   --  --  --  HFNC prongs    07/19/21 1215  --  74  20  123/72  96  98 %   --  12 L/min  Mid flow nasal cannula  --    07/19/21 1200  --  60  --  --  --  98 %   --  --  --  --    07/19/21 1100  --  74  --  --  --  81 %Abnormal    --  --  --  --    07/19/21 1000  --  82  --  --  --  91 %   --  --  --  --    07/19/21 0900  --  80  --  --  --  91 %   --  --  --  --    07/19/21 0800  --  76  20  161/87  121  89 %Abnormal    --  6 L/min  Nasal cannula  --    07/19/21 0701  99 1 °F (37 3 °C)  --  --  --  --  --   --  --  --  --              Pertinent Labs/Diagnostic Results:   Results from last 7 days   Lab Units 07/15/21  1143   SARS-COV-2  Positive*     Results from last 7 days   Lab Units 07/19/21  0637 07/18/21  0459 07/17/21  0529 07/15/21  1152   WBC Thousand/uL 3 19* 2 68* 2 92* 2 92*   HEMOGLOBIN g/dL 8 8* 8 7* 9 2* 9 6*   HEMATOCRIT % 27 4* 27 3* 28 2* 29 9*   PLATELETS Thousands/uL 227 216 227 220   NEUTROS ABS Thousands/µL  --   --   --  2 31         Results from last 7 days   Lab Units 07/19/21  0637 07/18/21  0459 07/17/21  0529 07/16/21  2133   SODIUM mmol/L 140 139 133* 134*   POTASSIUM mmol/L 3 6 3 1* 3 2* 3 3*   CHLORIDE mmol/L 108 108 104 107   CO2 mmol/L 21 20* 21 15*   ANION GAP mmol/L 11 11 8 12   BUN mg/dL 38* 34* 40* 43*   CREATININE mg/dL 3 79* 3 58* 3 78* 3 89*   EGFR ml/min/1 73sq m 11 12 11 11   CALCIUM mg/dL 7 8* 7 5* 7 6* 8 0*   PHOSPHORUS mg/dL  --  2 8  --   --      Results from last 7 days   Lab Units 07/19/21  0637 07/18/21  0459 07/17/21  0529 07/16/21  2133   AST U/L 43 41 40 50*   ALT U/L 11* 9* 10* 12   ALK PHOS U/L 44* 42* 46 48   TOTAL PROTEIN g/dL 5 8* 6 0* 6 4 6 9   ALBUMIN g/dL 2 3* 2 2* 2 4* 2 6*   TOTAL BILIRUBIN mg/dL 0 45 0 48 0 42 0 46         Results from last 7 days   Lab Units 07/19/21  0637 07/18/21  0459 07/17/21  0529 07/16/21  2133 07/15/21  1152   GLUCOSE RANDOM mg/dL 87 100 141* 101 110         Results from last 7 days   Lab Units 07/15/21  1254   TROPONIN I ng/mL 0 04     Results from last 7 days   Lab Units 07/19/21  0637 07/17/21  0529 07/15/21  2201   D-DIMER QUANTITATIVE ug/ml FEU 1 80* 0 64* 0 69*             Results from last 7 days   Lab Units 07/19/21  0637 07/15/21  1152   PROCALCITONIN ng/ml 0 11 0 12     Results from last 7 days   Lab Units 07/15/21  1152   LACTIC ACID mmol/L 1 1     Results from last 7 days   Lab Units 07/19/21  0637 07/17/21  0529   FERRITIN ng/mL 2,004* 1,101*       Results from last 7 days   Lab Units 07/19/21  0637 07/17/21  0529 07/15/21  2201   CRP mg/L 112 0* 80 0* 66 4*         Results from last 7 days   Lab Units 07/15/21  1149   CLARITY UA  Cloudy   COLOR UA  Yellow   SPEC GRAV UA  1 020   PH UA  8 5*   GLUCOSE UA mg/dl Negative   KETONES UA mg/dl Negative   BLOOD UA  Large*   PROTEIN UA mg/dl >=300*   NITRITE UA Negative   BILIRUBIN UA  Interference- unable to analyze*   UROBILINOGEN UA E U /dl 0 2   LEUKOCYTES UA  Large*   WBC UA /hpf Innumerable*   RBC UA /hpf 4-10*   BACTERIA UA /hpf Innumerable*   EPITHELIAL CELLS WET PREP /hpf None Seen       Results from last 7 days   Lab Units 07/15/21  2201   C DIFF TOXIN B BY PCR  Negative       Results from last 7 days   Lab Units 07/15/21  1151 07/15/21  1149   BLOOD CULTURE  No Growth After 4 Days  No Growth After 4 Days  --    URINE CULTURE   --  >100,000 cfu/ml          Medications:     apixaban, 2 5 mg, Oral, Daily  ascorbic acid, 1,000 mg, Oral, Q12H LONG  atorvastatin, 40 mg, Oral, Daily With Dinner  calcitriol, 0 25 mcg, Oral, Daily  cholecalciferol, 2,000 Units, Oral, Daily  citalopram, 20 mg, Oral, Daily  dexamethasone, 8 mg, Intravenous, Q12H LONG  famotidine, 20 mg, Oral, Daily  levothyroxine, 75 mcg, Oral, Daily  melatonin, 3 mg, Oral, HS  metoprolol tartrate, 25 mg, Oral, BID  zinc sulfate, 220 mg, Oral, Daily   Followed by  George Blum ON 7/22/2021] multivitamin-minerals, 1 tablet, Oral, Daily  remdesivir, 100 mg, Intravenous, Q24H  ruxolitinib, 10 mg, Oral, Every Other Day  saccharomyces boulardii, 250 mg, Oral, BID  sodium bicarbonate, 1,300 mg, Oral, BID after meals      Continuous IV Infusions:     PRN Meds:  acetaminophen, 650 mg, Oral, Q4H PRN  albuterol, 2 puff, Inhalation, Q4H PRN  ondansetron, 4 mg, Intravenous, Q4H PRN        Discharge Plan: to be determined     Network Utilization Review Department  ATTENTION: Please call with any questions or concerns to [phone] and carefully listen to the prompts so that you are directed to the right person  All voicemails are confidential   Loyd Calender all requests for admission clinical reviews, approved or denied determinations and any other requests to dedicated fax number below belonging to the campus where the patient is receiving treatment   List of dedicated fax numbers for the Facilities:  [facility] ADMISSION DENIALS (Administrative/Medical Necessity) [phone]   [address] (Maternity/NICU/Pediatrics) [address] [facility],[address]Th Floor [facility] [address] [place] [facility] [address] Neto Frey [address] [id number] [place] [address] [address] [facility] [address] [facility] [phone]

## 2021-07-20 NOTE — CASE MANAGEMENT
Per chart review, pt on 45L HFNC, no expected discharge date at this time  STR referrals have been placed in [place],  department to follow for accepting facility and medical stability

## 2021-07-20 NOTE — ASSESSMENT & PLAN NOTE
Lab Results   Component Value Date    HGB 8 7 (L) 07/20/2021    HGB 8 8 (L) 07/19/2021    HGB 8 7 (L) 07/18/2021   · Stable  · Monitor

## 2021-07-21 LAB
ANION GAP SERPL CALCULATED.3IONS-SCNC: 9 MMOL/L (ref 4–13)
BUN SERPL-MCNC: 54 MG/DL (ref 5–25)
CALCIUM SERPL-MCNC: 8 MG/DL (ref 8.3–10.1)
CHLORIDE SERPL-SCNC: 109 MMOL/L (ref 100–108)
CO2 SERPL-SCNC: 22 MMOL/L (ref 21–32)
CREAT SERPL-MCNC: 3.77 MG/DL (ref 0.6–1.3)
ERYTHROCYTE [DISTWIDTH] IN BLOOD BY AUTOMATED COUNT: 15.2 % (ref 11.6–15.1)
GFR SERPL CREATININE-BSD FRML MDRD: 11 ML/MIN/1.73SQ M
GLUCOSE SERPL-MCNC: 142 MG/DL (ref 65–140)
HCT VFR BLD AUTO: 24.6 % (ref 34.8–46.1)
HGB BLD-MCNC: 8 G/DL (ref 11.5–15.4)
MCH RBC QN AUTO: 29.7 PG (ref 26.8–34.3)
MCHC RBC AUTO-ENTMCNC: 32.5 G/DL (ref 31.4–37.4)
MCV RBC AUTO: 91 FL (ref 82–98)
PLATELET # BLD AUTO: 246 THOUSANDS/UL (ref 149–390)
PMV BLD AUTO: 10.2 FL (ref 8.9–12.7)
POTASSIUM SERPL-SCNC: 3.5 MMOL/L (ref 3.5–5.3)
RBC # BLD AUTO: 2.69 MILLION/UL (ref 3.81–5.12)
SODIUM SERPL-SCNC: 140 MMOL/L (ref 136–145)
WBC # BLD AUTO: 4.18 THOUSAND/UL (ref 4.31–10.16)

## 2021-07-21 PROCEDURE — 80048 BASIC METABOLIC PNL TOTAL CA: CPT | Performed by: INTERNAL MEDICINE

## 2021-07-21 PROCEDURE — 99233 SBSQ HOSP IP/OBS HIGH 50: CPT | Performed by: INTERNAL MEDICINE

## 2021-07-21 PROCEDURE — 94760 N-INVAS EAR/PLS OXIMETRY 1: CPT

## 2021-07-21 PROCEDURE — 99232 SBSQ HOSP IP/OBS MODERATE 35: CPT | Performed by: INTERNAL MEDICINE

## 2021-07-21 PROCEDURE — 85027 COMPLETE CBC AUTOMATED: CPT | Performed by: INTERNAL MEDICINE

## 2021-07-21 RX ADMIN — METOPROLOL TARTRATE 25 MG: 25 TABLET, FILM COATED ORAL at 08:59

## 2021-07-21 RX ADMIN — DEXAMETHASONE SODIUM PHOSPHATE 8 MG: 4 INJECTION INTRA-ARTICULAR; INTRALESIONAL; INTRAMUSCULAR; INTRAVENOUS; SOFT TISSUE at 21:17

## 2021-07-21 RX ADMIN — CALCITRIOL CAPSULES 0.25 MCG 0.25 MCG: 0.25 CAPSULE ORAL at 09:00

## 2021-07-21 RX ADMIN — ZINC SULFATE 220 MG (50 MG) CAPSULE 220 MG: CAPSULE at 08:59

## 2021-07-21 RX ADMIN — Medication 250 MG: at 09:00

## 2021-07-21 RX ADMIN — OXYCODONE HYDROCHLORIDE AND ACETAMINOPHEN 1000 MG: 500 TABLET ORAL at 21:17

## 2021-07-21 RX ADMIN — SODIUM BICARBONATE 650 MG TABLET 1300 MG: at 08:59

## 2021-07-21 RX ADMIN — ATORVASTATIN CALCIUM 40 MG: 40 TABLET, FILM COATED ORAL at 18:25

## 2021-07-21 RX ADMIN — Medication 2000 UNITS: at 08:59

## 2021-07-21 RX ADMIN — MELATONIN TAB 3 MG 3 MG: 3 TAB at 21:17

## 2021-07-21 RX ADMIN — Medication 250 MG: at 18:26

## 2021-07-21 RX ADMIN — DEXAMETHASONE SODIUM PHOSPHATE 8 MG: 4 INJECTION INTRA-ARTICULAR; INTRALESIONAL; INTRAMUSCULAR; INTRAVENOUS; SOFT TISSUE at 08:59

## 2021-07-21 RX ADMIN — APIXABAN 2.5 MG: 2.5 TABLET, FILM COATED ORAL at 08:59

## 2021-07-21 RX ADMIN — LEVOTHYROXINE SODIUM 75 MCG: 75 TABLET ORAL at 05:39

## 2021-07-21 RX ADMIN — OXYCODONE HYDROCHLORIDE AND ACETAMINOPHEN 1000 MG: 500 TABLET ORAL at 08:59

## 2021-07-21 RX ADMIN — METOPROLOL TARTRATE 25 MG: 25 TABLET, FILM COATED ORAL at 18:26

## 2021-07-21 RX ADMIN — CITALOPRAM HYDROBROMIDE 20 MG: 20 TABLET ORAL at 09:00

## 2021-07-21 RX ADMIN — SODIUM BICARBONATE 650 MG TABLET 1300 MG: at 18:26

## 2021-07-21 RX ADMIN — FAMOTIDINE 20 MG: 20 TABLET ORAL at 08:59

## 2021-07-21 NOTE — CASE MANAGEMENT
Patient reviewed during care coordination rounds with Dr Rebeca Eaton who informed that pt remains on 35L HFNC, not yet medically stable for discharge  CM department to follow for medical stability  Per [employer] [facility], [facility] or [facility] may be able to accept pt once medically stable  CM spoke with pt's son, Maureen Gaona as pt is not answering her room phone  Pt's son reported that he would be interested in a bed at [facility] if still available once pt is medically stable  CM sent [address] message relaying same

## 2021-07-21 NOTE — PROGRESS NOTES
[facility]  Progress Note - Jorge Alberto Ser [id number], 77 y o  female MRN: [id number]  Unit/Bed#: -01 Encounter: [id number]  Primary Care Provider: Cullen Benson MD   Date and time admitted to hospital: 7/15/2021 10:52 AM    * COVID-19 virus infection  Assessment & Plan  · Patient presented with fever, generalized weakness, diarrhea decreasing oral intake and difficulty with ambulation for 1 week per admission record review  · COVID-19 positive   · Pt is not vaccinated   · Patient with increasing oxygen requirements, now on Moderate Pathway  · Check blood type  · CBC and CMP daily  · CRP, D-dimer, and ferritin up-trending - continue to trend  · Abx were discontinued prior given procal negative x2  · Continue vmtain D, vitamin C, zinc, atorvastatin, and famotidine per protocol  · Cont on IV dexamethasone 8mg bid, finished course of remdesivir  · Pulm following, holding off acemtra for now  · CXR on admission question mild bilateral groundglass opacity in the upper lungs  · S/p dose of IV Lasix 7/18 due to possible volume overload per Nephrology   · Remains on stepdown - low threshold to involve critical care   · Respiratory protocol         Acute respiratory failure with hypoxia ([facility] [address] )  Assessment & Plan  · In the setting of COVID-19 as above  · Plan as per above  · Remains on High flow but oxygen requirements trending down    Acute renal failure superimposed on stage 5 chronic kidney disease, not on chronic dialysis [facility])  Assessment & Plan  Lab Results   Component Value Date    EGFR 11 07/21/2021    EGFR 11 07/20/2021    EGFR 11 07/19/2021    CREATININE 3 77 (H) 07/21/2021    CREATININE 3 67 (H) 07/20/2021    CREATININE 3 79 (H) 07/19/2021     · Appreciate ongoing Nephrology recs - suspected prerenal azotemia given poor intake and diarrhea +/- component of COVID-19  · Cr baseline 3 4-3 8, follows with Dr Jenna Saunders  · Initially received IVF followed by one time dose of lasix 20mg IV   · Nephrology following   · Remains non oliguric, no need for HD as of yet per nephrology    Febrile illness  Assessment & Plan  · In the setting of COVID infection, plan as per above  · C  diff culture negative  · Urine culture with mixed contaminant x4  · Blood cultures negative x2 a  · Procal negative x2  Off abx    Class 2 severe obesity due to excess calories with serious comorbidity and body mass index (BMI) of 35 0 to 35 9 in adult [facility])  Assessment & Plan  Body mass index is 35 39 kg/m²  · Nutrition consult once improved from above    History of Clostridioides difficile colitis  Assessment & Plan  · Diarrhea likely secondary to covid 19 infection  · Noted history of C  Diff  · C  Diff testing was negative on admission, however patient did received IV abx  Received PO Vanco for prophylaxis for 3 days post last dose of IV abx    Anemia in CKD (chronic kidney disease)  Assessment & Plan  Lab Results   Component Value Date    HGB 8 0 (L) 07/21/2021    HGB 8 7 (L) 07/20/2021    HGB 8 8 (L) 07/19/2021   · Chronic, no sign of acute blood loss  · Cont to monitor, transfuse if < 7     Polycythemia vera ([facility] [address] )  Assessment & Plan  · Patient follows up with outpatient Hematology Oncology, currently on Jakafi     Obstructive uropathy  Assessment & Plan  · S/p ileostomy for nonfunctioning bladder and chronic hydro  · OP urology follow up    Chronic saddle pulmonary embolism (HCC)  Assessment & Plan  · Continue Eliquis      VTE Pharmacologic Prophylaxis:   Pharmacologic: Apixaban (Eliquis)  Mechanical VTE Prophylaxis in Place: No    Patient Centered Rounds: I have performed bedside rounds with nursing staff today  Discussions with Specialists or Other Care Team Provider:     Education and Discussions with Family / Patient: Patient and called her son Criselda Reyes    Time Spent for Care: 30 minutes  More than 50% of total time spent on counseling and coordination of care as described above      Current Length of Stay: 6 day(s)    Current Patient Status: Inpatient   Certification Statement: The patient will continue to require additional inpatient hospital stay due to COVID 19 pna, acute hypoxic resp failure    Discharge Plan:     Code Status: Level 1 - Full Code      Subjective:   Sitting up in chair  Reports of improved dyspnea  Remains on high flow but o2 requirements down to 35L    Objective:     Vitals:   Temp (24hrs), Av 7 °F (36 5 °C), Min:97 4 °F (36 3 °C), Max:98 4 °F (36 9 °C)    Temp:  [97 4 °F (36 3 °C)-98 4 °F (36 9 °C)] 97 4 °F (36 3 °C)  HR:  [57-72] 66  Resp:  [20] 20  BP: (125-163)/(72-81) 147/76  SpO2:  [92 %-99 %] 95 %  Body mass index is 35 39 kg/m²  Input and Output Summary (last 24 hours): Intake/Output Summary (Last 24 hours) at 2021 1946  Last data filed at 2021 1830  Gross per 24 hour   Intake 30 ml   Output 925 ml   Net -895 ml       Physical Exam:     Physical Exam  Cardiovascular:      Rate and Rhythm: Normal rate and regular rhythm  Pulses: Normal pulses  Heart sounds: Normal heart sounds  Pulmonary:      Effort: Pulmonary effort is normal  No respiratory distress  Breath sounds: No wheezing or rales  Comments: Diminished bs  Abdominal:      General: Abdomen is flat  Bowel sounds are normal  There is no distension  Palpations: Abdomen is soft  Tenderness: There is no abdominal tenderness  There is no guarding  Musculoskeletal:         General: Normal range of motion  Cervical back: Normal range of motion and neck supple  Right lower leg: No edema  Left lower leg: No edema  Skin:     General: Skin is warm and dry  Neurological:      General: No focal deficit present  Mental Status: She is alert and oriented to person, place, and time  Mental status is at baseline  Cranial Nerves: No cranial nerve deficit  Motor: No weakness         Additional Data:     Labs:    Results from last 7 days   Lab Units 07/21/21  0439 07/15/21  1152   WBC Thousand/uL 4 18* 2 92*   HEMOGLOBIN g/dL 8 0* 9 6*   HEMATOCRIT % 24 6* 29 9*   PLATELETS Thousands/uL 246 220   NEUTROS PCT %  --  79*   LYMPHS PCT %  --  12*   MONOS PCT %  --  8   EOS PCT %  --  0     Results from last 7 days   Lab Units 07/21/21  0439 07/20/21  0530   SODIUM mmol/L 140 138   POTASSIUM mmol/L 3 5 3 5   CHLORIDE mmol/L 109* 108   CO2 mmol/L 22 20*   BUN mg/dL 54* 42*   CREATININE mg/dL 3 77* 3 67*   ANION GAP mmol/L 9 10   CALCIUM mg/dL 8 0* 8 1*   ALBUMIN g/dL  --  2 3*   TOTAL BILIRUBIN mg/dL  --  0 56   ALK PHOS U/L  --  49   ALT U/L  --  9*   AST U/L  --  44   GLUCOSE RANDOM mg/dL 142* 144*                 Results from last 7 days   Lab Units 07/20/21  0530 07/19/21  0637 07/15/21  1152   LACTIC ACID mmol/L  --   --  1 1   PROCALCITONIN ng/ml 0 17 0 11 0 12           * I Have Reviewed All Lab Data Listed Above  * Additional Pertinent Lab Tests Reviewed: All Labs Within Last 24 Hours Reviewed    Imaging:    Imaging Reports Reviewed Today Include:   Imaging Personally Reviewed by Myself Includes:      Recent Cultures (last 7 days):     Results from last 7 days   Lab Units 07/15/21  2201 07/15/21  1151 07/15/21  1149   BLOOD CULTURE   --  No Growth After 5 Days  No Growth After 5 Days    --    URINE CULTURE   --   --  >100,000 cfu/ml    C DIFF TOXIN B BY PCR  Negative  --   --        Last 24 Hours Medication List:   Current Facility-Administered Medications   Medication Dose Route Frequency Provider Last Rate    acetaminophen  650 mg Oral Q4H PRN Jenna Pee, DO      albuterol  2 puff Inhalation Q4H PRN Otto Ann MD      apixaban  2 5 mg Oral Daily Jenna Pee, DO      ascorbic acid  1,000 mg Oral Q12H [address] Jenna Pee, DO      atorvastatin  40 mg Oral Daily With [address], DO      calcitriol  0 25 mcg Oral Daily Jenna Pee, DO      cholecalciferol  2,000 Units Oral Daily Jenna Pee, DO      citalopram  20 mg Oral Daily [employer] DO Roddy      dexamethasone  8 mg Intravenous Q12H [facility] Criselda Emerson MD      famotidine  20 mg Oral Daily Laveda Ratel, DO      levothyroxine  75 mcg Oral Daily Laveda Ratel, DO      melatonin  3 mg Oral HS Laveda Ratel, DO      metoprolol tartrate  25 mg Oral BID Laveda Ratel, DO      [START ON 7/22/2021] multivitamin-minerals  1 tablet Oral Daily Laveda Ratel, DO      ondansetron  4 mg Intravenous Q4H PRN Laveda Ratel, DO      ruxolitinib  10 mg Oral Every Other Day Laveda Ratel, DO      saccharomyces boulardii  250 mg Oral BID Rebeca Schwab, CRNP      sodium bicarbonate  1,300 mg Oral BID after meals Coleman Lama DO          Today, Patient Was Seen By: Sonu Gordon DO    ** Please Note: Dictation voice to text software may have been used in the creation of this document   **

## 2021-07-21 NOTE — ASSESSMENT & PLAN NOTE
· Patient presented with fever, generalized weakness, diarrhea decreasing oral intake and difficulty with ambulation for 1 week per admission record review  · COVID-19 positive   · Pt is not vaccinated   · Patient with increasing oxygen requirements, now on Moderate Pathway  · Check blood type  · CBC and CMP daily  · CRP, D-dimer, and ferritin up-trending - continue to trend  · Abx were discontinued prior given procal negative x2  · Continue vmtain D, vitamin C, zinc, atorvastatin, and famotidine per protocol  · Cont on IV dexamethasone 8mg bid, finished course of remdesivir  · Pulm following, holding off acemtra for now  · CXR on admission question mild bilateral groundglass opacity in the upper lungs  · S/p dose of IV Lasix 7/18 due to possible volume overload per Nephrology   · Remains on stepdown - low threshold to involve critical care   · Respiratory protocol

## 2021-07-21 NOTE — ASSESSMENT & PLAN NOTE
· In the setting of COVID-19 as above  · Plan as per above  · Remains on High flow but oxygen requirements trending down

## 2021-07-21 NOTE — ASSESSMENT & PLAN NOTE
Lab Results   Component Value Date    EGFR 11 07/21/2021    EGFR 11 07/20/2021    EGFR 11 07/19/2021    CREATININE 3 77 (H) 07/21/2021    CREATININE 3 67 (H) 07/20/2021    CREATININE 3 79 (H) 07/19/2021     · Appreciate ongoing Nephrology recs - suspected prerenal azotemia given poor intake and diarrhea +/- component of COVID-19  · Cr baseline 3 4-3 8, follows with Dr Jose Champion  · Initially received IVF followed by one time dose of lasix 20mg IV   · Nephrology following   · Remains non oliguric, no need for HD as of yet per nephrology

## 2021-07-21 NOTE — PROGRESS NOTES
NEPHROLOGY PROGRESS NOTE   Jai Alcantar 77 y o  female MRN: [id number]  Unit/Bed#: -01 Encounter: [id number]      ASSESSMENT & PLAN    1  LUANN (POA) on CKD likely pre renal azotemia that has progressed to ATN plus minus component of COVID-19 on stage 5 chronic kidney disease  ? Creatinine was as high as 4 09 and now  stable  ? Consent for dialysis was obtained by my colleague  ? Baseline creatinine has been in the mid threes  ? Electrolytes and acid-base status are stable  ? Will monitor closely for the initiation of renal replacement therapy  ? Has had a negative fluid balance with poor p o  Intake will hold diuretics for right now  ? Has left-sided hydronephrosis with the recent history of left-sided nephrostomy tube and solitary kidney function with left renal atrophy     2  Electrolytes/Acid Base  ? Severe metabolic acidosis-continue to monitor-currently on sodium bicarbonate     3  Blood Pressure  ? Blood pressures are currently stable  ? Avoid hypotension  ? Currently on metoprolol 25 mg 2 times daily     4  Anemia of CKD  ? Hemoglobins are stable, platelet count stable will monitor     5  CKD-BMD  ? Continue calcitriol for secondary hyperparathyroidism on vitamin D3 as well 2000 units  ? Monitor phosphorus     6  Clinical Course/CV Risk Reduction/Health maintenance/communication  ? COVID-19 pneumonia-ongoing treatment per primary-attempting to wean oxygen    DISCUSSION    Overall stable from a renal standpoint  Poor appetite and poor p o  Intake  Good blood pressure and now tolerating oral intake will give Lasix as needed  Discussed with nurse and pulmonary regarding plan above    SUBJECTIVE:    No acute distress oxygen being weaned by Pulmonary  Tolerating some p o   Intake now    OBJECTIVE:  Current Weight: Weight - Scale: 82 2 kg (181 lb 3 5 oz)  @  Vitals:    07/21/21 0342 07/21/21 0700 07/21/21 0737 07/21/21 0800   BP:   156/81    BP Location:       Pulse:    64   Resp:       Temp:  97 6 °F (36 4 °C)     TempSrc:  Oral     SpO2: 95%   94%   Weight:       Height:           Intake/Output Summary (Last 24 hours) at 7/21/2021 1024  Last data filed at 7/21/2021 0400  Gross per 24 hour   Intake 280 ml   Output 950 ml   Net -670 ml     Weight (last 2 days)     None        Patient seen from outside room for source control and discussed with with Pulmonary and nurse  General:  Distress when oxygen his removed  Eyes:  Positive pallor  ENT:  On high-flow nasal cannula  Neck: supple, no JVD  Chest: no respiratory distress, no accessory muscle use, normal respiratory effort  CVS:  No tachycardia on the monitor  Abdomen:  Nondistended abdomen  Extremities:  Some edema lower extremity      Medications:    Current Facility-Administered Medications:     acetaminophen (TYLENOL) tablet 650 mg, 650 mg, Oral, Q4H PRN, Frantz Alejandra, DO    albuterol (PROVENTIL HFA,VENTOLIN HFA) inhaler 2 puff, 2 puff, Inhalation, Q4H PRN, Gabriela Simmons MD    apixaban [place]) tablet 2 5 mg, 2 5 mg, Oral, Daily, [place] Alejandra, DO, 2 5 mg at 07/21/21 0859    ascorbic acid (VITAMIN C) tablet 1,000 mg, 1,000 mg, Oral, Q12H [facility], [place] Alejandra, DO, 1,000 mg at 07/21/21 0859    atorvastatin (LIPITOR) tablet 40 mg, 40 mg, Oral, Daily With Rosalea Buffalo, DO, 40 mg at 07/20/21 1700    calcitriol (ROCALTROL) capsule 0 25 mcg, 0 25 mcg, Oral, Daily, [place] Alejandra, DO, 0 25 mcg at 07/21/21 0900    cholecalciferol (VITAMIN D3) tablet 2,000 Units, 2,000 Units, Oral, Daily, Frantz Alejandra, DO, 2,000 Units at 07/21/21 0859    citalopram (CeleXA) tablet 20 mg, 20 mg, Oral, Daily, Frantz Alejandra, DO, 20 mg at 07/21/21 0900    dexamethasone (DECADRON) injection 8 mg, 8 mg, Intravenous, Q12H [facility], Merrick Bello MD, 8 mg at 07/21/21 0859    famotidine (PEPCID) tablet 20 mg, 20 mg, Oral, Daily, Frantz Roberts DO, 20 mg at 07/21/21 0859    levothyroxine tablet 75 mcg, 75 mcg, Oral, Daily, Frantz Roberts DO, 75 mcg at 07/21/21 0539    melatonin tablet 3 mg, 3 mg, Oral, HS, Patrizia Moffett DO, 3 mg at 07/20/21 2052    metoprolol tartrate (LOPRESSOR) tablet 25 mg, 25 mg, Oral, BID, Patrizia Moffett DO, 25 mg at 07/21/21 0859    [COMPLETED] zinc sulfate (ZINCATE) capsule 220 mg, 220 mg, Oral, Daily, 220 mg at 07/21/21 0859 **FOLLOWED BY** [START ON 7/22/2021] multivitamin-minerals (CENTRUM ADULTS) tablet 1 tablet, 1 tablet, Oral, Daily, Patrizia Moffett DO    ondansetron [facility]) injection 4 mg, 4 mg, Intravenous, Q4H PRN, Patrizia Moffett DO, 4 mg at 07/17/21 0845    ruxolitinib (JAKAFI) tablet 10 mg, 10 mg, Oral, Every Other Day, Patrizia Moffett DO, 10 mg at 07/20/21 1416    saccharomyces boulardii (FLORASTOR) capsule 250 mg, 250 mg, Oral, BID, VICTORINA Gil, 250 mg at 07/21/21 0900    sodium bicarbonate tablet 1,300 mg, 1,300 mg, Oral, BID after meals, Heriberto Swift DO, 1,300 mg at 07/21/21 0859    Invasive Devices:      Lab Results:   Results from last 7 days   Lab Units 07/21/21  0439 07/20/21  0530 07/19/21  0637 07/18/21  0459 07/17/21  0529 07/16/21  2133 07/15/21  1152 07/15/21  1151 07/15/21  1149   WBC Thousand/uL 4 18* 2 73* 3 19* 2 68* 2 92*  --   --   --   --    HEMOGLOBIN g/dL 8 0* 8 7* 8 8* 8 7* 9 2*  --   --   --   --    HEMATOCRIT % 24 6* 26 5* 27 4* 27 3* 28 2*  --   --   --   --    PLATELETS Thousands/uL 246 234 227 216 227  --   --   --   --    POTASSIUM mmol/L 3 5 3 5 3 6 3 1* 3 2* 3 3*   < >  --   --    CHLORIDE mmol/L 109* 108 108 108 104 107   < >  --   --    CO2 mmol/L 22 20* 21 20* 21 15*   < >  --   --    BUN mg/dL 54* 42* 38* 34* 40* 43*   < >  --   --    CREATININE mg/dL 3 77* 3 67* 3 79* 3 58* 3 78* 3 89*   < >  --   --    CALCIUM mg/dL 8 0* 8 1* 7 8* 7 5* 7 6* 8 0*   < >  --   --    PHOSPHORUS mg/dL  --   --   --  2 8  --   --   --   --   --    ALK PHOS U/L  --  49 44* 42* 46 48  --   --   --    ALT U/L  --  9* 11* 9* 10* 12  --   --   --    AST U/L  --  44 43 41 40 50*  --   --   --    BLOOD CULTURE   --   --   --   --   --   -- --  No Growth After 5 Days  No Growth After 5 Days  --    LEUKOCYTES UA   --   --   --   --   --   --   --   --  Large*   BLOOD UA   --   --   --   --   --   --   --   --  Large*    < > = values in this interval not displayed  Portions of the record may have been created with voice recognition software  Occasional wrong word or "sound a like" substitutions may have occurred due to the inherent limitations of voice recognition software  Read the chart carefully and recognize, using context, where substitutions have occurred  If you have any questions, please contact the dictating provider

## 2021-07-21 NOTE — ASSESSMENT & PLAN NOTE
· Diarrhea likely secondary to covid 19 infection  · Noted history of C  Diff  · C  Diff testing was negative on admission, however patient did received IV abx   Received PO Vanco for prophylaxis for 3 days post last dose of IV abx

## 2021-07-21 NOTE — PROGRESS NOTES
Progress Note - Pulmonary   Pablo Rico 77 y o  female MRN: [id number]  Unit/Bed#: -01 Encounter: [id number]      Assessment and Plan:     Acute hypoxic respiratory failure secondary to COVID-19 PNA    - unvaccinated   - + on COVID 7/15   - c/w Decadron 8mg IV bid   - Remdesivir x 5 days total   - maintain goal saturation 90-92%   - weaned [place] to 35L, 65%   - continued goals of care discussion   - encouraged her to turn side to side- her ileal conduit precludes her from lying on her belly    - s/p IV lasix 40mg on 7/19  - Ferritin increasing, D-dimer 1 6, pCT 0 17- no new labs for today   - would avoid Acemtra given hx SBO, moreover, CRP not extremely high       LUANN on CKD Stage 5   - nephrology following     Hx C  Diff   Chronic PE   - on Eliquis     Polcycythemia vera   - on Jakafi, follows with Hem/Onc       Subjective:   No new complaints  No Cough, no chest pain  Review of Systems   All other systems reviewed and are negative  Objective:     Vitals:    07/20/21 2343 07/21/21 0027 07/21/21 0334 07/21/21 0342   BP:  145/78 125/72    BP Location:  Left arm Left arm    Pulse:  70 57    Resp:  20 20    Temp:  97 5 °F (36 4 °C) 98 4 °F (36 9 °C)    TempSrc:  Oral Oral    SpO2: 96% 93% 95% 95%   Weight:       Height:               Intake/Output Summary (Last 24 hours) at 7/21/2021 0727  Last data filed at 7/21/2021 0400  Gross per 24 hour   Intake 280 ml   Output 950 ml   Net -670 ml         Physical Exam  Vitals reviewed  Constitutional:       Appearance: She is obese  She is ill-appearing  HENT:      Head: Normocephalic  Nose: Nose normal       Mouth/Throat:      Mouth: Mucous membranes are moist    Eyes:      Pupils: Pupils are equal, round, and reactive to light  Cardiovascular:      Rate and Rhythm: Normal rate and regular rhythm  Pulses: Normal pulses  Heart sounds: Normal heart sounds  Pulmonary:      Breath sounds: Rales present        Comments: Diminished breath sounds, poor inspiratory effort   Abdominal:      General: Abdomen is flat  Palpations: Abdomen is soft  Comments: Ileal conduit intact    Musculoskeletal:         General: Normal range of motion  Skin:     General: Skin is warm and dry  Neurological:      General: No focal deficit present  Mental Status: She is alert and oriented to person, place, and time  Labs: I have personally reviewed pertinent lab results  Results from last 7 days   Lab Units 07/21/21  0439 07/20/21  0530 07/19/21  0637 07/15/21  1152   WBC Thousand/uL 4 18* 2 73* 3 19* 2 92*   HEMOGLOBIN g/dL 8 0* 8 7* 8 8* 9 6*   HEMATOCRIT % 24 6* 26 5* 27 4* 29 9*   PLATELETS Thousands/uL 246 234 227 220   NEUTROS PCT %  --   --   --  79*   MONOS PCT %  --   --   --  8      Results from last 7 days   Lab Units 07/21/21  0439 07/20/21  0530 07/19/21  0637 07/18/21  0459   POTASSIUM mmol/L 3 5 3 5 3 6 3 1*   CHLORIDE mmol/L 109* 108 108 108   CO2 mmol/L 22 20* 21 20*   BUN mg/dL 54* 42* 38* 34*   CREATININE mg/dL 3 77* 3 67* 3 79* 3 58*   CALCIUM mg/dL 8 0* 8 1* 7 8* 7 5*   ALK PHOS U/L  --  49 44* 42*   ALT U/L  --  9* 11* 9*   AST U/L  --  44 43 41         Results from last 7 days   Lab Units 07/18/21  0459   PHOSPHORUS mg/dL 2 8          Results from last 7 days   Lab Units 07/15/21  1152   LACTIC ACID mmol/L 1 1     0   Lab Value Date/Time    TROPONINI 0 04 07/15/2021 1254    TROPONINI <0 02 05/08/2021 1318    TROPONINI <0 02 07/15/2020 1016    TROPONINI <0 02 07/15/2020 0716    TROPONINI <0 02 05/23/2019 1045    TROPONINI <0 02 01/11/2019 1242    TROPONINI <0 02 10/06/2018 2111       Microbiology:  As above     Imaging and other studies: I have personally reviewed pertinent reports     and I have personally reviewed pertinent films in PACS        Laurita Arambula MD   Pulmonary & Critical Care Fellow  [facility]

## 2021-07-21 NOTE — ASSESSMENT & PLAN NOTE
· Patient follows up with outpatient Hematology Oncology, currently on [facility]

## 2021-07-21 NOTE — ASSESSMENT & PLAN NOTE
Lab Results   Component Value Date    HGB 8 0 (L) 07/21/2021    HGB 8 7 (L) 07/20/2021    HGB 8 8 (L) 07/19/2021   · Chronic, no sign of acute blood loss  · Cont to monitor, transfuse if < 7

## 2021-07-22 LAB
ANION GAP SERPL CALCULATED.3IONS-SCNC: 11 MMOL/L (ref 4–13)
APTT PPP: 31 SECONDS (ref 23–37)
APTT PPP: >210 SECONDS (ref 23–37)
BUN SERPL-MCNC: 61 MG/DL (ref 5–25)
CALCIUM SERPL-MCNC: 8 MG/DL (ref 8.3–10.1)
CHLORIDE SERPL-SCNC: 107 MMOL/L (ref 100–108)
CO2 SERPL-SCNC: 20 MMOL/L (ref 21–32)
CREAT SERPL-MCNC: 3.6 MG/DL (ref 0.6–1.3)
D DIMER PPP FEU-MCNC: 3.28 UG/ML FEU
ERYTHROCYTE [DISTWIDTH] IN BLOOD BY AUTOMATED COUNT: 15.1 % (ref 11.6–15.1)
ERYTHROCYTE [DISTWIDTH] IN BLOOD BY AUTOMATED COUNT: 15.2 % (ref 11.6–15.1)
GFR SERPL CREATININE-BSD FRML MDRD: 12 ML/MIN/1.73SQ M
GLUCOSE SERPL-MCNC: 125 MG/DL (ref 65–140)
HCT VFR BLD AUTO: 25.3 % (ref 34.8–46.1)
HCT VFR BLD AUTO: 26.3 % (ref 34.8–46.1)
HGB BLD-MCNC: 8.2 G/DL (ref 11.5–15.4)
HGB BLD-MCNC: 8.4 G/DL (ref 11.5–15.4)
INR PPP: 1.36 (ref 0.84–1.19)
MCH RBC QN AUTO: 28.6 PG (ref 26.8–34.3)
MCH RBC QN AUTO: 29.2 PG (ref 26.8–34.3)
MCHC RBC AUTO-ENTMCNC: 31.9 G/DL (ref 31.4–37.4)
MCHC RBC AUTO-ENTMCNC: 32.4 G/DL (ref 31.4–37.4)
MCV RBC AUTO: 90 FL (ref 82–98)
MCV RBC AUTO: 90 FL (ref 82–98)
PLATELET # BLD AUTO: 237 THOUSANDS/UL (ref 149–390)
PLATELET # BLD AUTO: 256 THOUSANDS/UL (ref 149–390)
PMV BLD AUTO: 9.7 FL (ref 8.9–12.7)
PMV BLD AUTO: 9.9 FL (ref 8.9–12.7)
POTASSIUM SERPL-SCNC: 3.8 MMOL/L (ref 3.5–5.3)
PROTHROMBIN TIME: 16.8 SECONDS (ref 11.6–14.5)
RBC # BLD AUTO: 2.81 MILLION/UL (ref 3.81–5.12)
RBC # BLD AUTO: 2.94 MILLION/UL (ref 3.81–5.12)
SODIUM SERPL-SCNC: 138 MMOL/L (ref 136–145)
WBC # BLD AUTO: 6.92 THOUSAND/UL (ref 4.31–10.16)
WBC # BLD AUTO: 7.65 THOUSAND/UL (ref 4.31–10.16)

## 2021-07-22 PROCEDURE — 94760 N-INVAS EAR/PLS OXIMETRY 1: CPT | Performed by: SOCIAL WORKER

## 2021-07-22 PROCEDURE — 85730 THROMBOPLASTIN TIME PARTIAL: CPT | Performed by: INTERNAL MEDICINE

## 2021-07-22 PROCEDURE — 85610 PROTHROMBIN TIME: CPT | Performed by: INTERNAL MEDICINE

## 2021-07-22 PROCEDURE — 85379 FIBRIN DEGRADATION QUANT: CPT | Performed by: INTERNAL MEDICINE

## 2021-07-22 PROCEDURE — 80048 BASIC METABOLIC PNL TOTAL CA: CPT | Performed by: INTERNAL MEDICINE

## 2021-07-22 PROCEDURE — 99232 SBSQ HOSP IP/OBS MODERATE 35: CPT | Performed by: INTERNAL MEDICINE

## 2021-07-22 PROCEDURE — 94760 N-INVAS EAR/PLS OXIMETRY 1: CPT

## 2021-07-22 PROCEDURE — 97110 THERAPEUTIC EXERCISES: CPT

## 2021-07-22 PROCEDURE — 99231 SBSQ HOSP IP/OBS SF/LOW 25: CPT | Performed by: INTERNAL MEDICINE

## 2021-07-22 PROCEDURE — 99233 SBSQ HOSP IP/OBS HIGH 50: CPT | Performed by: INTERNAL MEDICINE

## 2021-07-22 PROCEDURE — 85027 COMPLETE CBC AUTOMATED: CPT | Performed by: INTERNAL MEDICINE

## 2021-07-22 PROCEDURE — 97116 GAIT TRAINING THERAPY: CPT

## 2021-07-22 RX ORDER — HEPARIN SODIUM 10000 [USP'U]/100ML
3-30 INJECTION, SOLUTION INTRAVENOUS
Status: DISCONTINUED | OUTPATIENT
Start: 2021-07-22 | End: 2021-07-28

## 2021-07-22 RX ORDER — HEPARIN SODIUM 1000 [USP'U]/ML
6400 INJECTION, SOLUTION INTRAVENOUS; SUBCUTANEOUS
Status: DISCONTINUED | OUTPATIENT
Start: 2021-07-22 | End: 2021-07-28

## 2021-07-22 RX ORDER — FUROSEMIDE 10 MG/ML
60 INJECTION INTRAMUSCULAR; INTRAVENOUS ONCE
Status: COMPLETED | OUTPATIENT
Start: 2021-07-22 | End: 2021-07-22

## 2021-07-22 RX ORDER — HEPARIN SODIUM 1000 [USP'U]/ML
6400 INJECTION, SOLUTION INTRAVENOUS; SUBCUTANEOUS ONCE
Status: COMPLETED | OUTPATIENT
Start: 2021-07-22 | End: 2021-07-22

## 2021-07-22 RX ORDER — HEPARIN SODIUM 1000 [USP'U]/ML
3200 INJECTION, SOLUTION INTRAVENOUS; SUBCUTANEOUS
Status: DISCONTINUED | OUTPATIENT
Start: 2021-07-22 | End: 2021-07-28

## 2021-07-22 RX ADMIN — ATORVASTATIN CALCIUM 40 MG: 40 TABLET, FILM COATED ORAL at 17:11

## 2021-07-22 RX ADMIN — FAMOTIDINE 20 MG: 20 TABLET ORAL at 09:02

## 2021-07-22 RX ADMIN — MELATONIN TAB 3 MG 3 MG: 3 TAB at 21:35

## 2021-07-22 RX ADMIN — METOPROLOL TARTRATE 25 MG: 25 TABLET, FILM COATED ORAL at 09:02

## 2021-07-22 RX ADMIN — CALCITRIOL CAPSULES 0.25 MCG 0.25 MCG: 0.25 CAPSULE ORAL at 09:03

## 2021-07-22 RX ADMIN — Medication 250 MG: at 17:17

## 2021-07-22 RX ADMIN — Medication 2000 UNITS: at 09:02

## 2021-07-22 RX ADMIN — Medication 250 MG: at 09:03

## 2021-07-22 RX ADMIN — MULTIPLE VITAMINS W/ MINERALS TAB 1 TABLET: TAB ORAL at 09:02

## 2021-07-22 RX ADMIN — FUROSEMIDE 60 MG: 10 INJECTION, SOLUTION INTRAVENOUS at 09:02

## 2021-07-22 RX ADMIN — HEPARIN SODIUM 6400 UNITS: 1000 INJECTION INTRAVENOUS; SUBCUTANEOUS at 09:42

## 2021-07-22 RX ADMIN — HEPARIN SODIUM 18 UNITS/KG/HR: 10000 INJECTION, SOLUTION INTRAVENOUS at 09:43

## 2021-07-22 RX ADMIN — OXYCODONE HYDROCHLORIDE AND ACETAMINOPHEN 1000 MG: 500 TABLET ORAL at 09:02

## 2021-07-22 RX ADMIN — LEVOTHYROXINE SODIUM 75 MCG: 75 TABLET ORAL at 05:47

## 2021-07-22 RX ADMIN — RUXOLITINIB 10 MG: 10 TABLET ORAL at 12:20

## 2021-07-22 RX ADMIN — DEXAMETHASONE SODIUM PHOSPHATE 8 MG: 4 INJECTION INTRA-ARTICULAR; INTRALESIONAL; INTRAMUSCULAR; INTRAVENOUS; SOFT TISSUE at 21:35

## 2021-07-22 RX ADMIN — SODIUM BICARBONATE 650 MG TABLET 1300 MG: at 09:03

## 2021-07-22 RX ADMIN — SODIUM BICARBONATE 650 MG TABLET 1300 MG: at 16:31

## 2021-07-22 RX ADMIN — DEXAMETHASONE SODIUM PHOSPHATE 8 MG: 4 INJECTION INTRA-ARTICULAR; INTRALESIONAL; INTRAMUSCULAR; INTRAVENOUS; SOFT TISSUE at 09:03

## 2021-07-22 RX ADMIN — METOPROLOL TARTRATE 25 MG: 25 TABLET, FILM COATED ORAL at 17:11

## 2021-07-22 RX ADMIN — CITALOPRAM HYDROBROMIDE 20 MG: 20 TABLET ORAL at 09:03

## 2021-07-22 NOTE — ASSESSMENT & PLAN NOTE
Lab Results   Component Value Date    EGFR 12 07/22/2021    EGFR 11 07/21/2021    EGFR 11 07/20/2021    CREATININE 3 60 (H) 07/22/2021    CREATININE 3 77 (H) 07/21/2021    CREATININE 3 67 (H) 07/20/2021     · Appreciate ongoing Nephrology recs - suspected prerenal azotemia given poor intake and diarrhea +/- component of COVID-19  · Cr baseline 3 4-3 8, follows with Dr Nancy Moreno  · Initially received IVF followed by one time dose of lasix 20mg IV   · Nephrology following   · Remains non oliguric, no need for HD as of yet per nephrology

## 2021-07-22 NOTE — CASE MANAGEMENT
Patient not yet medically stable for discharge, pt on 40L HFNC at this time  OO continues to follow for STR placement once medically stable

## 2021-07-22 NOTE — PHYSICAL THERAPY NOTE
Physical Therapy Treatment Note    Patient's Name: Kavya Montalvo  : [birth year]       21 0837   PT Last Visit   PT Visit Date 21   Note Type   Note Type Treatment   Pain Assessment   Pain Assessment Tool Pain Assessment not indicated - pt denies pain   Restrictions/Precautions   Weight Bearing Precautions Per Order No   Other Precautions Contact/isolation; Airborne/isolation;Multiple lines;Telemetry;O2;Fall Risk  (COVID (+), HFNC)   General   Chart Reviewed Yes   Family/Caregiver Present No   Cognition   Overall Cognitive Status Impaired   Attention Difficulty attending to directions   Orientation Level Oriented X4   Memory Decreased recall of precautions;Decreased short term memory;Decreased recall of recent events   Following Commands Follows one step commands with increased time or repetition   Comments pt continues to present with flat affect, but agreeable to work with therapy   Transfers   Sit to Stand   (CGA)   Additional items Assist x 1; Increased time required;Verbal cues   Stand to Sit   (CGA)   Additional items Assist x 1; Increased time required;Verbal cues   Additional Comments Transfers with RW  VCs for proper hand placement with descent to chair   Ambulation/Elevation   Gait pattern Excessively slow; Short stride;Decreased foot clearance   Gait Assistance 4  Minimal assist   Additional items Assist x 1;Verbal cues   Assistive Device Rolling walker   Distance 12ft- distance limited by fatigue   Balance   Static Sitting Fair +   Dynamic Sitting Fair +   Static Standing Fair   Dynamic Standing Fair -   Ambulatory Fair -   Activity Tolerance   Activity Tolerance Patient limited by fatigue   Nurse Made Aware ok to see per RN   Exercises   Knee AROM [employer]; Bilateral  (x12)   Ankle Pumps Sitting;15 reps;AROM; Bilateral  (heel raises)   Assessment   Prognosis Good   Problem List Decreased endurance; Impaired balance;Decreased mobility; Decreased cognition;Decreased strength   Assessment Pt continues to present with decreased mobility, strength, endurance, and overall limited activity tolerance  However, pt is making steady progress towards mobility goals  At this time, pt is able to perform transfers with CGA and ambulates short distances with RW at [facility] level  Pt requires increased time to complete all mobility tasks with frequent rest breaks- SpO2 levels WNL throughout (on HFNC)  Educated pt on seated therex, which she was able to complete with good technique  Pt would benefit from continued acute skilled PT to address above deficits  Continuing to recommend rehab upon d/c     Barriers to Discharge Inaccessible home environment;Decreased caregiver support   Goals   Patient Goals to eat his breakfast   STG Expiration Date 08/02/21   PT Treatment Day 2   Plan   Treatment/Interventions Functional transfer training;LE strengthening/ROM; Elevations; Therapeutic exercise; Endurance training;Bed mobility;Gait training;Spoke to nursing;Spoke to case management;OT   Progress Progressing toward goals   PT Frequency Other (Comment)  (3-5x/wk)   Recommendation   PT Discharge Recommendation Post acute rehabilitation services   PT - OK to Discharge Yes  (to rehab, when medically appropriate)   [address] [place] [address] Mobility Inpatient   Turning in Bed Without Bedrails 3   Lying on Back to Sitting on Edge of Flat Bed 2   Moving Bed to Chair 3   Standing Up From Chair 3   Walk in Room 3   Climb 3-5 Stairs 2   Basic Mobility Inpatient Raw Score 16   Basic Mobility Standardized Score 38 32       Toshia Whitfield, PT, DPT

## 2021-07-22 NOTE — RESPIRATORY THERAPY NOTE
resp care      07/22/21 0724   Respiratory Assessment   Assessment Type Assess only   General Appearance Awake; Alert   Respiratory Pattern Normal   Chest Assessment Chest expansion symmetrical   Bilateral Breath Sounds Clear;Diminished   Resp Comments Pt upset about her current condition  Unable to decrease 02 or flow at this time  Sat remains 91% on 80% fi02 /40lpm  Will cont to monitor      Non-Invasive Information   O2 Interface Device HFNC prongs   Non-Invasive Ventilation Mode HFNC (High flow)   SpO2 91 %   $ Pulse Oximetry Spot Check Charge Completed   Non-Invasive Settings   FiO2 (%) 80   Flow (lpm) 40   Temperature (Set) 31   Non-Invasive Readings   Heater Temperature (Obs) 31

## 2021-07-22 NOTE — RESPIRATORY THERAPY NOTE
resp care      07/22/21 1144   Respiratory Assessment   Resp Comments Pt OOB in chair  Found on 40lpm /100%  Sat 100%  FLow decreased to 30lpm/02 to 60  SLPM would like titration to MF later today  Will cont to titrate off hfnc      Non-Invasive Information   O2 Interface Device HFNC prongs   Non-Invasive Ventilation Mode HFNC (High flow)   $ Pulse Oximetry Spot Check Charge Completed   Non-Invasive Settings   FiO2 (%) 60   Flow (lpm) 30   Temperature (Set) 31   Non-Invasive Readings   Heater Temperature (Obs) 31

## 2021-07-22 NOTE — ASSESSMENT & PLAN NOTE
· In the setting of COVID-19 as above  · Plan as per above  · Remains on High flow, required increase of oxygen back to 40L

## 2021-07-22 NOTE — ASSESSMENT & PLAN NOTE
· Patient presented with fever, generalized weakness, diarrhea decreasing oral intake and difficulty with ambulation for 1 week per admission record review  · COVID-19 pneumonia, unfortunately unvaccinated  · S/p completion of remdesivir x 5 days  · On increased dose of dexamethasone 8mg bid, on gi prophylaxis   · Per pulmonary considering possibility of acemtra  · Abx dcd given low procal x 2  · Worsening d-dimer thus recommended per pulmonary to d/c eliquis and start on heparin gtt  · Received another dose of IV lasix 60mg x 1 7/22; last dose on 7/18  · Cont to trend d-dimer  · Remains on stepdown - low threshold to involve critical care   · Respiratory protocol

## 2021-07-22 NOTE — RESPIRATORY THERAPY NOTE
resp care      07/22/21 1559   Respiratory Assessment   Resp Comments 02 decreased to 40%  Next change will be to       Non-Invasive Information   O2 Interface Device HFNC prongs   Non-Invasive Ventilation Mode HFNC (High flow)   $ Pulse Oximetry Spot Check Charge Completed   Non-Invasive Settings   FiO2 (%) 40   Flow (lpm) 30   Temperature (Set) 31   Non-Invasive Readings   Heater Temperature (Obs) 31

## 2021-07-22 NOTE — PROGRESS NOTES
NEPHROLOGY PROGRESS NOTE   Edwin Ross 77 y o  female MRN: [id number]  Unit/Bed#: -01 Encounter: [id number]      ASSESSMENT & PLAN    1  LUANN (POA) on CKD likely pre renal azotemia that has progressed to ATN plus minus component of COVID-19 on stage 5 chronic kidney disease  ? Creatinine was as high as 4 09 and now  stable  ? Consent for dialysis was obtained by my colleague  ? Baseline creatinine has been in the mid threes  ? Electrolytes and acid-base status are stable  ? Will monitor closely for the initiation of renal replacement therapy  ? Increasing oxygen requirements given a dose of Lasix 60 mg IV x1 650 cc of urine output thus far  ? Has left-sided hydronephrosis with the recent history of left-sided nephrostomy tube and solitary kidney function with left renal atrophy     2  Electrolytes/Acid Base  ? Severe metabolic acidosis-continue to monitor-currently on sodium bicarbonate will hold off on up titrating dose for now     3  Blood Pressure  ? Blood pressures are currently stable  ? Avoid hypotension  ? Currently on metoprolol 25 mg 2 times daily     4  Anemia of CKD  ? Hemoglobins are stable, platelet count stable will monitor     5  CKD-BMD  ? Continue calcitriol for secondary hyperparathyroidism on vitamin D3 as well 2000 units  ? Monitor phosphorus     6  Clinical Course/CV Risk Reduction/Health maintenance/communication  ?  COVID-19 pneumonia-ongoing treatment per primary         SUBJECTIVE:    Patient was seen today  Sitting up in chair    OBJECTIVE:  Current Weight: Weight - Scale: 82 2 kg (181 lb 3 5 oz)  @  Vitals:    07/22/21 0553 07/22/21 0724 07/22/21 0755 07/22/21 0807   BP: 104/85  162/73    BP Location:   Left arm    Pulse: 61  70    Resp:   22    Temp: 98 4 °F (36 9 °C)   98 6 °F (37 °C)   TempSrc: Oral      SpO2: 98% 91% 93%    Weight:       Height:           Intake/Output Summary (Last 24 hours) at 7/22/2021 1526  Last data filed at 7/22/2021 1300  Gross per 24 hour   Intake 420 ml   Output 1475 ml   Net -1055 ml     Weight (last 2 days)     None          General: conscious, cooperative, in no acute distress  Eyes: conjunctivae pink, anicteric sclerae  ENT:  Nasal cannula in place  Neck: supple, no JVD  Chest: no respiratory distress, no accessory muscle use, normal respiratory effort  CVS: normal heart rate, no friction rub  Abdomen: soft, non-tender, non-distended, normoactive bowel sounds  Extremities:  Mild edema in the lower extremity  Skin: no rash  Neuro: awake, alert, answering questions appropriately      Medications:    Current Facility-Administered Medications:     acetaminophen (TYLENOL) tablet 650 mg, 650 mg, Oral, Q4H PRN, Raysa Mt, DO    albuterol (PROVENTIL HFA,VENTOLIN HFA) inhaler 2 puff, 2 puff, Inhalation, Q4H PRN, Julio Calhoun MD    atorvastatin (LIPITOR) tablet 40 mg, 40 mg, Oral, Daily With Sol Valeriano, DO, 40 mg at 07/21/21 1825    calcitriol (ROCALTROL) capsule 0 25 mcg, 0 25 mcg, Oral, Daily, Raysa Mt, DO, 0 25 mcg at 07/22/21 5805    cholecalciferol (VITAMIN D3) tablet 2,000 Units, 2,000 Units, Oral, Daily, Raysa Mt, DO, 2,000 Units at 07/22/21 0902    citalopram (CeleXA) tablet 20 mg, 20 mg, Oral, Daily, Raysa Mt, DO, 20 mg at 07/22/21 0903    dexamethasone (DECADRON) injection 8 mg, 8 mg, Intravenous, Q12H [address], Sharona Villalta MD, 8 mg at 07/22/21 4359    famotidine (PEPCID) tablet 20 mg, 20 mg, Oral, Daily, Raysa Mt, DO, 20 mg at 07/22/21 0902    heparin (porcine) 25,000 units in 0 45% NaCl 250 mL infusion (premix), 3-30 Units/kg/hr (Order-Specific), Intravenous, Titrated, Marlinjessica Dolapolonia, DO, Last Rate: 14 4 mL/hr at 07/22/21 0943, 18 Units/kg/hr at 07/22/21 0943    heparin (porcine) injection 3,200 Units, 3,200 Units, Intravenous, Q1H PRN, Marlinda Doles, DO    heparin (porcine) injection 6,400 Units, 6,400 Units, Intravenous, Q1H PRN, Marlinda Doles, DO    levothyroxine tablet 75 mcg, 75 mcg, Oral, Daily, Quenten , DO, 75 mcg at 07/22/21 0547    melatonin tablet 3 mg, 3 mg, Oral, HS, Quenten , DO, 3 mg at 07/21/21 2117    metoprolol tartrate (LOPRESSOR) tablet 25 mg, 25 mg, Oral, BID, Quenten , DO, 25 mg at 07/22/21 0902    [COMPLETED] zinc sulfate (ZINCATE) capsule 220 mg, 220 mg, Oral, Daily, 220 mg at 07/21/21 0859 **FOLLOWED BY** multivitamin-minerals (CENTRUM ADULTS) tablet 1 tablet, 1 tablet, Oral, Daily, Quenten , DO, 1 tablet at 07/22/21 0902    ondansetron [facility]) injection 4 mg, 4 mg, Intravenous, Q4H PRN, Quenten , DO, 4 mg at 07/17/21 0845    ruxolitinib (JAKAFI) tablet 10 mg, 10 mg, Oral, Every Other Day, Quenten , DO, 10 mg at 07/22/21 1220    saccharomyces boulardii (FLORASTOR) capsule 250 mg, 250 mg, Oral, BID, Alysia Kauffman, VICTORINA, 250 mg at 07/22/21 3359    sodium bicarbonate tablet 1,300 mg, 1,300 mg, Oral, BID after meals, Calderon Swift, DO, 1,300 mg at 07/22/21 0622    Invasive Devices:      Lab Results:   Results from last 7 days   Lab Units 07/22/21  0937 07/22/21  0602 07/21/21  0439 07/20/21  0530 07/19/21  0637 07/18/21  0459 07/17/21  0529 07/16/21  2133 07/16/21  2133   WBC Thousand/uL 7 65 6 92 4 18* 2 73* 3 19* 2 68* 2 92*   < >  --    HEMOGLOBIN g/dL 8 4* 8 2* 8 0* 8 7* 8 8* 8 7* 9 2*   < >  --    HEMATOCRIT % 26 3* 25 3* 24 6* 26 5* 27 4* 27 3* 28 2*   < >  --    PLATELETS Thousands/uL 256 237 246 234 227 216 227   < >  --    POTASSIUM mmol/L  --  3 8 3 5 3 5 3 6 3 1* 3 2*  --  3 3*   CHLORIDE mmol/L  --  107 109* 108 108 108 104  --  107   CO2 mmol/L  --  20* 22 20* 21 20* 21  --  15*   BUN mg/dL  --  61* 54* 42* 38* 34* 40*  --  43*   CREATININE mg/dL  --  3 60* 3 77* 3 67* 3 79* 3 58* 3 78*  --  3 89*   CALCIUM mg/dL  --  8 0* 8 0* 8 1* 7 8* 7 5* 7 6*  --  8 0*   PHOSPHORUS mg/dL  --   --   --   --   --  2 8  --   --   --    ALK PHOS U/L  --   --   --  49 44* 42* 46  --  48   ALT U/L  --   --   --  9* 11* 9* 10*  --  12   AST U/L  --   --   -- 44 43 41 40  --  50*    < > = values in this interval not displayed  Portions of the record may have been created with voice recognition software  Occasional wrong word or "sound a like" substitutions may have occurred due to the inherent limitations of voice recognition software  Read the chart carefully and recognize, using context, where substitutions have occurred  If you have any questions, please contact the dictating provider

## 2021-07-22 NOTE — PROGRESS NOTES
Progress Note - Pulmonary   [place]  77 y o  female MRN: [id number]  Unit/Bed#: -01 Encounter: [id number]      Assessment and Plan:     Acute hypoxic respiratory failure secondary to COVID-19 PNA    - unvaccinated   - + on COVID 7/15   - c/w Decadron 8mg IV bid   - s/p Remdesivir x 5 days   - maintain goal saturation 90-92%   - on [place] to 40L, 75%   - continued goals of care discussion   - encouraged her to turn side to side- her ileal conduit precludes her from lying on her belly    - s/p IV lasix 40mg on 7/19  - Ferritin increasing, D-dimer increased 3 2, pCT 0 17  - would avoid Acemtra given hx SBO, moreover, CRP not extremely high     - Lasix 60mg IV x1   - recommend holding eliquis and placing on heparin gtt     LUANN on CKD Stage 5   - nephrology following     Hx C  Diff   Chronic PE   - on Eliquis     Polcycythemia vera   - on Jakafi, follows with Hem/Onc       Subjective:   No new complaints  Overnight o2 requirements increased  She is trying to eat more  Review of Systems   All other systems reviewed and are negative  Objective:     Vitals:    07/22/21 0137 07/22/21 0323 07/22/21 0553 07/22/21 0724   BP:   104/85    BP Location:       Pulse:   61    Resp:       Temp:   98 4 °F (36 9 °C)    TempSrc:   Oral    SpO2: 97% 96% 98% 91%   Weight:       Height:               Intake/Output Summary (Last 24 hours) at 7/22/2021 0725  Last data filed at 7/22/2021 0601  Gross per 24 hour   Intake --   Output 825 ml   Net -825 ml         Physical Exam  Vitals reviewed  Constitutional:       Appearance: She is obese  She is ill-appearing  HENT:      Head: Normocephalic  Nose: Nose normal       Mouth/Throat:      Mouth: Mucous membranes are moist    Eyes:      Pupils: Pupils are equal, round, and reactive to light  Cardiovascular:      Rate and Rhythm: Normal rate and regular rhythm  Pulses: Normal pulses  Heart sounds: Normal heart sounds     Pulmonary:      Breath sounds: Rales present  Abdominal:      General: Abdomen is flat  Palpations: Abdomen is soft  Comments: Ileal conduit intact    Musculoskeletal:         General: Normal range of motion  Skin:     General: Skin is warm and dry  Neurological:      General: No focal deficit present  Mental Status: She is alert and oriented to person, place, and time  Labs: I have personally reviewed pertinent lab results  Results from last 7 days   Lab Units 07/22/21  0602 07/21/21  0439 07/20/21  0530 07/15/21  1152   WBC Thousand/uL 6 92 4 18* 2 73* 2 92*   HEMOGLOBIN g/dL 8 2* 8 0* 8 7* 9 6*   HEMATOCRIT % 25 3* 24 6* 26 5* 29 9*   PLATELETS Thousands/uL 237 246 234 220   NEUTROS PCT %  --   --   --  79*   MONOS PCT %  --   --   --  8      Results from last 7 days   Lab Units 07/22/21  0602 07/21/21  0439 07/20/21  0530 07/19/21  0637 07/18/21  0459   POTASSIUM mmol/L 3 8 3 5 3 5 3 6 3 1*   CHLORIDE mmol/L 107 109* 108 108 108   CO2 mmol/L 20* 22 20* 21 20*   BUN mg/dL 61* 54* 42* 38* 34*   CREATININE mg/dL 3 60* 3 77* 3 67* 3 79* 3 58*   CALCIUM mg/dL 8 0* 8 0* 8 1* 7 8* 7 5*   ALK PHOS U/L  --   --  49 44* 42*   ALT U/L  --   --  9* 11* 9*   AST U/L  --   --  44 43 41         Results from last 7 days   Lab Units 07/18/21  0459   PHOSPHORUS mg/dL 2 8          Results from last 7 days   Lab Units 07/15/21  1152   LACTIC ACID mmol/L 1 1     0   Lab Value Date/Time    TROPONINI 0 04 07/15/2021 1254    TROPONINI <0 02 05/08/2021 1318    TROPONINI <0 02 07/15/2020 1016    TROPONINI <0 02 07/15/2020 0716    TROPONINI <0 02 05/23/2019 1045    TROPONINI <0 02 01/11/2019 1242    TROPONINI <0 02 10/06/2018 2111       Microbiology:  As above     Imaging and other studies: I have personally reviewed pertinent reports     and I have personally reviewed pertinent films in PACS        Merrick Bello MD   Pulmonary & Critical Care Fellow  [facility]

## 2021-07-22 NOTE — RESPIRATORY THERAPY NOTE
resp care      07/22/21 1700   Respiratory Assessment   Resp Comments Pt changed to 15 lpm MF  Sat 96%

## 2021-07-22 NOTE — ASSESSMENT & PLAN NOTE
Lab Results   Component Value Date    HGB 8 4 (L) 07/22/2021    HGB 8 2 (L) 07/22/2021    HGB 8 0 (L) 07/21/2021   · Chronic, no sign of acute blood loss  · Cont to monitor, transfuse if < 7

## 2021-07-22 NOTE — PLAN OF CARE
Problem: PHYSICAL THERAPY ADULT  Goal: Performs mobility at highest level of function for planned discharge setting  See evaluation for individualized goals  Description: Treatment/Interventions: Functional transfer training, LE strengthening/ROM, Therapeutic exercise, Elevations, Endurance training, Equipment eval/education, Bed mobility, Gait training, Spoke to nursing, Spoke to case management          See flowsheet documentation for full assessment, interventions and recommendations  Outcome: Progressing  Note: Prognosis: Good  Problem List: Decreased endurance, Impaired balance, Decreased mobility, Decreased cognition, Decreased strength  Assessment: Pt continues to present with decreased mobility, strength, endurance, and overall limited activity tolerance  However, pt is making steady progress towards mobility goals  At this time, pt is able to perform transfers with CGA and ambulates short distances with RW at [facility] level  Pt requires increased time to complete all mobility tasks with frequent rest breaks- SpO2 levels WNL throughout (on HFNC)  Educated pt on seated therex, which she was able to complete with good technique  Pt would benefit from continued acute skilled PT to address above deficits  Continuing to recommend rehab upon d/c    Barriers to Discharge: Inaccessible home environment, Decreased caregiver support        PT Discharge Recommendation: Post acute rehabilitation services     PT - OK to Discharge: Yes (to rehab, when medically appropriate)    See flowsheet documentation for full assessment

## 2021-07-22 NOTE — PROGRESS NOTES
[facility]  Progress Note - Raeford Schilder [birth year], 77 y o  female MRN: [id number]  Unit/Bed#: -01 Encounter: [id number]  Primary Care Provider: Lopez Graham MD   Date and time admitted to hospital: 7/15/2021 10:52 AM    * COVID-19 virus infection  Assessment & Plan  · Patient presented with fever, generalized weakness, diarrhea decreasing oral intake and difficulty with ambulation for 1 week per admission record review  · COVID-19 pneumonia, unfortunately unvaccinated  · S/p completion of remdesivir x 5 days  · On increased dose of dexamethasone 8mg bid, on gi prophylaxis   · Per pulmonary considering possibility of acemtra  · Abx dcd given low procal x 2  · Worsening d-dimer thus recommended per pulmonary to d/c eliquis and start on heparin gtt  · Received another dose of IV lasix 60mg x 1 7/22; last dose on 7/18  · Cont to trend d-dimer  · Remains on stepdown - low threshold to involve critical care   · Respiratory protocol         Acute respiratory failure with hypoxia ([facility] [address] )  Assessment & Plan  · In the setting of COVID-19 as above  · Plan as per above  · Remains on High flow, required increase of oxygen back to 40L    Acute renal failure superimposed on stage 5 chronic kidney disease, not on chronic dialysis [facility])  Assessment & Plan  Lab Results   Component Value Date    EGFR 12 07/22/2021    EGFR 11 07/21/2021    EGFR 11 07/20/2021    CREATININE 3 60 (H) 07/22/2021    CREATININE 3 77 (H) 07/21/2021    CREATININE 3 67 (H) 07/20/2021     · Appreciate ongoing Nephrology recs - suspected prerenal azotemia given poor intake and diarrhea +/- component of COVID-19  · Cr baseline 3 4-3 8, follows with Dr Angelina Hartman  · Initially received IVF followed by one time dose of lasix 20mg IV   · Nephrology following   · Remains non oliguric, no need for HD as of yet per nephrology    Febrile illness  Assessment & Plan  · In the setting of COVID infection, plan as per above  · C  diff culture negative  · Urine culture with mixed contaminant x4  · Blood cultures negative x2 a  · Procal negative x2  Off abx    Class 2 severe obesity due to excess calories with serious comorbidity and body mass index (BMI) of 35 0 to 35 9 in adult [facility])  Assessment & Plan  Body mass index is 35 39 kg/m²  · Nutrition consult once improved from above    History of Clostridioides difficile colitis  Assessment & Plan  · Diarrhea likely secondary to covid 19 infection  · Noted history of C  Diff  · C  Diff testing was negative on admission, however patient did received IV abx  Received PO Vanco for prophylaxis for 3 days post last dose of IV abx    Anemia in CKD (chronic kidney disease)  Assessment & Plan  Lab Results   Component Value Date    HGB 8 4 (L) 07/22/2021    HGB 8 2 (L) 07/22/2021    HGB 8 0 (L) 07/21/2021   · Chronic, no sign of acute blood loss  · Cont to monitor, transfuse if < 7     Polycythemia vera ([facility] [address] )  Assessment & Plan  · Patient follows up with outpatient Hematology Oncology, currently on Jakafi     Obstructive uropathy  Assessment & Plan  · S/p ileostomy for nonfunctioning bladder and chronic hydro  · OP urology follow up    Chronic saddle pulmonary embolism ([facility] [address] )  Assessment & Plan  · On heparin gtt; switched due to elevated d-dimer      VTE Pharmacologic Prophylaxis:   Pharmacologic: Heparin Drip  Mechanical VTE Prophylaxis in Place: No    Patient Centered Rounds: I have performed bedside rounds with nursing staff today  Discussions with Specialists or Other Care Team Provider: Nephrology, pulmonary    Education and Discussions with Family / Patient: Patient, called son, no answer    Time Spent for Care: 30 minutes  More than 50% of total time spent on counseling and coordination of care as described above      Current Length of Stay: 7 day(s)    Current Patient Status: Inpatient   Certification Statement: The patient will continue to require additional inpatient hospital stay due to COVID PNA, acute hypoxic resp failure    Discharge Plan:     Code Status: Level 1 - Full Code      Subjective:   Oxygen requirements increased back to 40 L  Sitting up in chair  No sign of respiratory distress  No recurring diarrhea today thus far    Objective:     Vitals:   Temp (24hrs), Av 5 °F (36 9 °C), Min:98 3 °F (36 8 °C), Max:98 6 °F (37 °C)    Temp:  [98 3 °F (36 8 °C)-98 6 °F (37 °C)] 98 6 °F (37 °C)  HR:  [60-70] 70  Resp:  [20-22] 22  BP: (104-162)/(73-95) 162/73  SpO2:  [91 %-98 %] 93 %  Body mass index is 35 39 kg/m²  Input and Output Summary (last 24 hours): Intake/Output Summary (Last 24 hours) at 2021 1616  Last data filed at 2021 1300  Gross per 24 hour   Intake 420 ml   Output 1475 ml   Net -1055 ml       Physical Exam:     Physical Exam  Cardiovascular:      Rate and Rhythm: Normal rate and regular rhythm  Pulses: Normal pulses  Heart sounds: Normal heart sounds  Pulmonary:      Effort: Pulmonary effort is normal  No respiratory distress  Breath sounds: No wheezing or rales  Comments: Diminished bs  Abdominal:      General: Abdomen is flat  Bowel sounds are normal  There is no distension  Palpations: Abdomen is soft  Tenderness: There is no abdominal tenderness  Comments: + ileostomy   Musculoskeletal:         General: Normal range of motion  Cervical back: Normal range of motion and neck supple  Right lower leg: Edema present  Left lower leg: Edema present  Comments: Trace edema   Skin:     General: Skin is warm and dry  Neurological:      General: No focal deficit present  Mental Status: She is alert and oriented to person, place, and time  Mental status is at baseline  Cranial Nerves: No cranial nerve deficit  Motor: No weakness           Additional Data:     Labs:    Results from last 7 days   Lab Units 21  0937   WBC Thousand/uL 7 65   HEMOGLOBIN g/dL 8 4*   HEMATOCRIT % 26 3* PLATELETS Thousands/uL 256     Results from last 7 days   Lab Units 07/22/21  0602 07/20/21  0530   SODIUM mmol/L 138 138   POTASSIUM mmol/L 3 8 3 5   CHLORIDE mmol/L 107 108   CO2 mmol/L 20* 20*   BUN mg/dL 61* 42*   CREATININE mg/dL 3 60* 3 67*   ANION GAP mmol/L 11 10   CALCIUM mg/dL 8 0* 8 1*   ALBUMIN g/dL  --  2 3*   TOTAL BILIRUBIN mg/dL  --  0 56   ALK PHOS U/L  --  49   ALT U/L  --  9*   AST U/L  --  44   GLUCOSE RANDOM mg/dL 125 144*     Results from last 7 days   Lab Units 07/22/21  0937   INR  1 36*             Results from last 7 days   Lab Units 07/20/21  0530 07/19/21  0637   PROCALCITONIN ng/ml 0 17 0 11           * I Have Reviewed All Lab Data Listed Above  * Additional Pertinent Lab Tests Reviewed:  All Labs Within Last 24 Hours Reviewed    Imaging:    Imaging Reports Reviewed Today Include:   Imaging Personally Reviewed by Myself Includes:      Recent Cultures (last 7 days):     Results from last 7 days   Lab Units 07/15/21  2201   C DIFF TOXIN B BY PCR  Negative       Last 24 Hours Medication List:   Current Facility-Administered Medications   Medication Dose Route Frequency Provider Last Rate    acetaminophen  650 mg Oral Q4H PRN Annette Leggett, DO      albuterol  2 puff Inhalation Q4H PRN Suzy Landa MD      atorvastatin  40 mg Oral Daily With [email or web address], DO      calcitriol  0 25 mcg Oral Daily Annette Leggett, DO      cholecalciferol  2,000 Units Oral Daily Annette Leggett, DO      citalopram  20 mg Oral Daily Annette Leggett, DO      dexamethasone  8 mg Intravenous Q12H [address] Hussein Crain MD      famotidine  20 mg Oral Daily Annette Leggett, DO      heparin (porcine)  3-30 Units/kg/hr (Order-Specific) Intravenous Titrated Catherine Bernard DO 18 Units/kg/hr (07/22/21 0943)    heparin (porcine)  3,200 Units Intravenous Q1H PRN Catherine Bernard,       heparin (porcine)  6,400 Units Intravenous Q1H PRN Catherine Bernard, DO      levothyroxine  75 mcg Oral Daily Annette Leggett, DO      melatonin  3 mg Oral HS Anice Mines, DO      metoprolol tartrate  25 mg Oral BID Anice Mines, DO      multivitamin-minerals  1 tablet Oral Daily Anice Mines, DO      ondansetron  4 mg Intravenous Q4H PRN Anice Mines, DO      ruxolitinib  10 mg Oral Every Other Day Anice Mines, DO      saccharomyces boulardii  250 mg Oral BID VICTORINA Prakash      sodium bicarbonate  1,300 mg Oral BID after meals Lorraine Valdez DO          Today, Patient Was Seen By: Nia Pringle DO    ** Please Note: Dictation voice to text software may have been used in the creation of this document   **

## 2021-07-23 LAB
ANION GAP SERPL CALCULATED.3IONS-SCNC: 14 MMOL/L (ref 4–13)
APTT PPP: >210 SECONDS (ref 23–37)
BUN SERPL-MCNC: 67 MG/DL (ref 5–25)
CALCIUM SERPL-MCNC: 8.2 MG/DL (ref 8.3–10.1)
CHLORIDE SERPL-SCNC: 101 MMOL/L (ref 100–108)
CO2 SERPL-SCNC: 22 MMOL/L (ref 21–32)
CREAT SERPL-MCNC: 3.8 MG/DL (ref 0.6–1.3)
GFR SERPL CREATININE-BSD FRML MDRD: 11 ML/MIN/1.73SQ M
GLUCOSE SERPL-MCNC: 135 MG/DL (ref 65–140)
POTASSIUM SERPL-SCNC: 3.7 MMOL/L (ref 3.5–5.3)
SODIUM SERPL-SCNC: 137 MMOL/L (ref 136–145)

## 2021-07-23 PROCEDURE — 99231 SBSQ HOSP IP/OBS SF/LOW 25: CPT | Performed by: INTERNAL MEDICINE

## 2021-07-23 PROCEDURE — 99233 SBSQ HOSP IP/OBS HIGH 50: CPT | Performed by: INTERNAL MEDICINE

## 2021-07-23 PROCEDURE — 85730 THROMBOPLASTIN TIME PARTIAL: CPT | Performed by: INTERNAL MEDICINE

## 2021-07-23 PROCEDURE — 94760 N-INVAS EAR/PLS OXIMETRY 1: CPT

## 2021-07-23 PROCEDURE — 80048 BASIC METABOLIC PNL TOTAL CA: CPT | Performed by: INTERNAL MEDICINE

## 2021-07-23 RX ADMIN — Medication 250 MG: at 08:22

## 2021-07-23 RX ADMIN — Medication 2000 UNITS: at 08:19

## 2021-07-23 RX ADMIN — MELATONIN TAB 3 MG 3 MG: 3 TAB at 21:14

## 2021-07-23 RX ADMIN — METOPROLOL TARTRATE 25 MG: 25 TABLET, FILM COATED ORAL at 08:20

## 2021-07-23 RX ADMIN — SODIUM BICARBONATE 650 MG TABLET 1300 MG: at 08:22

## 2021-07-23 RX ADMIN — LEVOTHYROXINE SODIUM 75 MCG: 75 TABLET ORAL at 05:23

## 2021-07-23 RX ADMIN — DEXAMETHASONE SODIUM PHOSPHATE 8 MG: 4 INJECTION INTRA-ARTICULAR; INTRALESIONAL; INTRAMUSCULAR; INTRAVENOUS; SOFT TISSUE at 21:14

## 2021-07-23 RX ADMIN — DEXAMETHASONE SODIUM PHOSPHATE 8 MG: 4 INJECTION INTRA-ARTICULAR; INTRALESIONAL; INTRAMUSCULAR; INTRAVENOUS; SOFT TISSUE at 08:20

## 2021-07-23 RX ADMIN — Medication 250 MG: at 17:53

## 2021-07-23 RX ADMIN — MULTIPLE VITAMINS W/ MINERALS TAB 1 TABLET: TAB ORAL at 08:20

## 2021-07-23 RX ADMIN — CITALOPRAM HYDROBROMIDE 20 MG: 20 TABLET ORAL at 08:22

## 2021-07-23 RX ADMIN — ATORVASTATIN CALCIUM 40 MG: 40 TABLET, FILM COATED ORAL at 17:53

## 2021-07-23 RX ADMIN — CALCITRIOL CAPSULES 0.25 MCG 0.25 MCG: 0.25 CAPSULE ORAL at 08:22

## 2021-07-23 RX ADMIN — SODIUM BICARBONATE 650 MG TABLET 1300 MG: at 17:53

## 2021-07-23 RX ADMIN — HEPARIN SODIUM 12 UNITS/KG/HR: 10000 INJECTION, SOLUTION INTRAVENOUS at 04:08

## 2021-07-23 RX ADMIN — METOPROLOL TARTRATE 25 MG: 25 TABLET, FILM COATED ORAL at 17:53

## 2021-07-23 RX ADMIN — FAMOTIDINE 20 MG: 20 TABLET ORAL at 08:19

## 2021-07-23 NOTE — PROGRESS NOTES
Patient in am is alert and oriented to self and situation  However as day goes on, patient is more confused and needs more frequent orientation  Weak to transfer today requiring to assist transfer chair to bed  No bowel movement for this rn this shift  Patient cvp dressing changed as patient scratched at her skin below dressing with her nails and has a new scab noted  No redness periwound or around cvp site  Old drainage bloody is noted around cvp site  Cleansed and dried  Team made aware of medial port unable to draw blood but flushing without resistance  Hubs changed all three ports  Heparin ptt greater than two hundred at 1015 and held per protocol  Restarted 1115 at 9 units per hour  Redraw ptt [phone]  Patient continues to be more confused  Telephone replaced in room as this was broken  Kanwal pinto called patient and informed her that the autistic son with covid 23 is now intubated and [facility] in [facility]  Patient emotional expression encouraged and active listening employed  Call bell in reach  Patient overall appears to understand that she is confused and concerned  Encouraged her to rest  Provided distraction  Family unable to visit at this time  Frequent room checks made

## 2021-07-23 NOTE — CASE MANAGEMENT
Patient reviewed during care coordination rounds with Dr Silviano Hatch who informed that pt is now on 15L mid flow NC  No weekend discharge expected at this time  Pt remains recommended for STR, will require insurance auth prior to discharge  CM department to follow

## 2021-07-23 NOTE — UTILIZATION REVIEW
Continued Stay Review    Date: 7/15/2021                        Current Patient Class: Inpateint  Current Level of Care: Med/Surg  HPI:76 y o  female initially admitted on 7/15 with acute hypoxic respiratory failure 2/2 to Covid-19 PNA  Assessment/Plan: Weaned to 15 lpm yesterday  c/w Decadron 8mg IV bid x 10 days total (day 5/10)  S/p Remdesivir x 5 days  Okay for saturations to be 88% and above  Encouraged her to turn side to side- her ileal conduit precludes her from lying on her belly  Lasix 60mg IV x1 on 7/22  continue Hep gtt  Will need walk test closer to d/c  Up with assist  Reg diet   PT/OT evals    Vital Signs:   Date/Time  Temp  Pulse  Resp  BP  MAP (mmHg)  SpO2  FiO2 (%)  Calculated FIO2 (%) - Nasal Cannula  O2 Flow Rate (L/min)  Nasal Cannula O2 Flow Rate (L/min)  O2 Device  O2 Interface Device  Patient Position - Orthostatic VS   07/23/21 1318  --  --  --  --  --  98 %  --  80  --  15 L/min  Mid flow nasal cannula  --  --   07/23/21 0851  --  --  --  --  --  --  --  80  --  15 L/min  Mid flow nasal cannula  --  --   07/23/21 0700  98 4 °F (36 9 °C)  --  --  --  --  --  --  --  --  --  --  --  --   07/23/21 0356  98 °F (36 7 °C)  62  --  120/70  89  88 %Abnormal   --  --  --  --  --  --  --   07/23/21 0105  --  --  --  --  --  94 %  --  80  --  15 L/min  Mid flow nasal cannula  --  --   07/23/21 0000  97 6 °F (36 4 °C)  58  --  133/72  106  93 %  --  --  --  --  --  --  --   07/22/21 2026  --  --  --  --  --  94 %  --  80  15 L/min  15 L/min  Mid flow nasal cannula  --  --   07/22/21 1902  97 7 °F (36 5 °C)  58  --  123/76  108  98 %  --  --  --  --  --  --  --   07/22/21 1700  --  60  --  --  --  97 %  --  80  --  15 L/min  Mid flow nasal cannula  --  --         Pertinent Labs/Diagnostic Results:     Results from last 7 days   Lab Units 07/22/21  0937 07/22/21  0602 07/21/21  0439 07/20/21  0530 07/19/21  0637   WBC Thousand/uL 7 65 6 92 4 18* 2 73* 3 19*   HEMOGLOBIN g/dL 8 4* 8 2* 8 0* 8 7* 8 8* HEMATOCRIT % 26 3* 25 3* 24 6* 26 5* 27 4*   PLATELETS Thousands/uL 256 237 246 234 227     Results from last 7 days   Lab Units 07/23/21  0523 07/22/21  0602 07/21/21  0439 07/20/21  0530 07/19/21  0637 07/18/21  0459   SODIUM mmol/L 137 138 140 138 140 139   POTASSIUM mmol/L 3 7 3 8 3 5 3 5 3 6 3 1*   CHLORIDE mmol/L 101 107 109* 108 108 108   CO2 mmol/L 22 20* 22 20* 21 20*   ANION GAP mmol/L 14* 11 9 10 11 11   BUN mg/dL 67* 61* 54* 42* 38* 34*   CREATININE mg/dL 3 80* 3 60* 3 77* 3 67* 3 79* 3 58*   EGFR ml/min/1 73sq m 11 12 11 11 11 12   CALCIUM mg/dL 8 2* 8 0* 8 0* 8 1* 7 8* 7 5*   PHOSPHORUS mg/dL  --   --   --   --   --  2 8     Results from last 7 days   Lab Units 07/20/21  0530 07/19/21  0637 07/18/21  0459 07/17/21  0529 07/16/21  2133   AST U/L 44 43 41 40 50*   ALT U/L 9* 11* 9* 10* 12   ALK PHOS U/L 49 44* 42* 46 48   TOTAL PROTEIN g/dL 6 4 5 8* 6 0* 6 4 6 9   ALBUMIN g/dL 2 3* 2 3* 2 2* 2 4* 2 6*   TOTAL BILIRUBIN mg/dL 0 56 0 45 0 48 0 42 0 46     Results from last 7 days   Lab Units 07/23/21  0523 07/22/21  0602 07/21/21  0439 07/20/21  0530 07/19/21  0637 07/18/21  0459 07/17/21  0529 07/16/21  2133   GLUCOSE RANDOM mg/dL 135 125 142* 144* 87 100 141* 101     Results from last 7 days   Lab Units 07/22/21  0602 07/20/21  0530 07/19/21  0637 07/17/21  0529   D-DIMER QUANTITATIVE ug/ml FEU 3 28* 1 63* 1 80* 0 64*     Results from last 7 days   Lab Units 07/23/21  0848 07/22/21  2354 07/22/21  1602 07/22/21  0937   PROTIME seconds  --   --   --  16 8*   INR   --   --   --  1 36*   PTT seconds >210* >210* >210* 31     Results from last 7 days   Lab Units 07/20/21  0530 07/19/21  0637   PROCALCITONIN ng/ml 0 17 0 11     Results from last 7 days   Lab Units 07/20/21  0530 07/19/21  0637 07/17/21  0529   FERRITIN ng/mL 2,203* 2,004* 1,101*     Results from last 7 days   Lab Units 07/20/21  0530 07/19/21  0637 07/17/21  0529   CRP mg/L 116 0* 112 0* 80 0*     Medications:   Scheduled Medications:  atorvastatin, 40 mg, Oral, Daily With Dinner  calcitriol, 0 25 mcg, Oral, Daily  cholecalciferol, 2,000 Units, Oral, Daily  citalopram, 20 mg, Oral, Daily  dexamethasone, 8 mg, Intravenous, Q12H LONG  famotidine, 20 mg, Oral, Daily  levothyroxine, 75 mcg, Oral, Daily  melatonin, 3 mg, Oral, HS  metoprolol tartrate, 25 mg, Oral, BID  multivitamin-minerals, 1 tablet, Oral, Daily  ruxolitinib, 10 mg, Oral, Every Other Day  saccharomyces boulardii, 250 mg, Oral, BID  sodium bicarbonate, 1,300 mg, Oral, BID after meals    Continuous IV Infusions:  heparin (porcine), 3-30 Units/kg/hr (Order-Specific), Intravenous, Titrated    PRN Meds:  acetaminophen, 650 mg, Oral, Q4H PRN  albuterol, 2 puff, Inhalation, Q4H PRN  heparin (porcine), 3,200 Units, Intravenous, Q1H PRN  heparin (porcine), 6,400 Units, Intravenous, Q1H PRN  ondansetron, 4 mg, Intravenous, Q4H PRN        Discharge Plan: D    Network Utilization Review Department  ATTENTION: Please call with any questions or concerns to [phone] and carefully listen to the prompts so that you are directed to the right person  All voicemails are confidential   Loyd Calender all requests for admission clinical reviews, approved or denied determinations and any other requests to dedicated fax number below belonging to the campus where the patient is receiving treatment   List of dedicated fax numbers for the Facilities:  [address] [place] [address] DENIALS (Administrative/Medical Necessity) [phone]   [address] (Maternity/NICU/Pediatrics) [phone]   [address] [address] [facility] [address]ila [address] [id number] [facility] [address] [place] [address] 602 [facility] [phone]   [address] [place] [address] 951 [phone]

## 2021-07-24 LAB
ANION GAP SERPL CALCULATED.3IONS-SCNC: 10 MMOL/L (ref 4–13)
APTT PPP: 186 SECONDS (ref 23–37)
APTT PPP: 52 SECONDS (ref 23–37)
APTT PPP: 83 SECONDS (ref 23–37)
BUN SERPL-MCNC: 73 MG/DL (ref 5–25)
CALCIUM SERPL-MCNC: 8.2 MG/DL (ref 8.3–10.1)
CHLORIDE SERPL-SCNC: 104 MMOL/L (ref 100–108)
CO2 SERPL-SCNC: 24 MMOL/L (ref 21–32)
CREAT SERPL-MCNC: 3.63 MG/DL (ref 0.6–1.3)
D DIMER PPP FEU-MCNC: 3.8 UG/ML FEU
ERYTHROCYTE [DISTWIDTH] IN BLOOD BY AUTOMATED COUNT: 15.2 % (ref 11.6–15.1)
GFR SERPL CREATININE-BSD FRML MDRD: 12 ML/MIN/1.73SQ M
GLUCOSE SERPL-MCNC: 114 MG/DL (ref 65–140)
HCT VFR BLD AUTO: 24.3 % (ref 34.8–46.1)
HGB BLD-MCNC: 7.9 G/DL (ref 11.5–15.4)
MCH RBC QN AUTO: 28.9 PG (ref 26.8–34.3)
MCHC RBC AUTO-ENTMCNC: 32.5 G/DL (ref 31.4–37.4)
MCV RBC AUTO: 89 FL (ref 82–98)
PLATELET # BLD AUTO: 261 THOUSANDS/UL (ref 149–390)
PMV BLD AUTO: 9.8 FL (ref 8.9–12.7)
POTASSIUM SERPL-SCNC: 4.2 MMOL/L (ref 3.5–5.3)
RBC # BLD AUTO: 2.73 MILLION/UL (ref 3.81–5.12)
SODIUM SERPL-SCNC: 138 MMOL/L (ref 136–145)
WBC # BLD AUTO: 7.27 THOUSAND/UL (ref 4.31–10.16)

## 2021-07-24 PROCEDURE — 85730 THROMBOPLASTIN TIME PARTIAL: CPT | Performed by: EMERGENCY MEDICINE

## 2021-07-24 PROCEDURE — 99233 SBSQ HOSP IP/OBS HIGH 50: CPT | Performed by: INTERNAL MEDICINE

## 2021-07-24 PROCEDURE — 85730 THROMBOPLASTIN TIME PARTIAL: CPT | Performed by: INTERNAL MEDICINE

## 2021-07-24 PROCEDURE — 80048 BASIC METABOLIC PNL TOTAL CA: CPT | Performed by: INTERNAL MEDICINE

## 2021-07-24 PROCEDURE — 99232 SBSQ HOSP IP/OBS MODERATE 35: CPT | Performed by: INTERNAL MEDICINE

## 2021-07-24 PROCEDURE — 85027 COMPLETE CBC AUTOMATED: CPT | Performed by: INTERNAL MEDICINE

## 2021-07-24 PROCEDURE — 94760 N-INVAS EAR/PLS OXIMETRY 1: CPT

## 2021-07-24 PROCEDURE — 85379 FIBRIN DEGRADATION QUANT: CPT | Performed by: INTERNAL MEDICINE

## 2021-07-24 RX ORDER — ALBUMIN (HUMAN) 12.5 G/50ML
12.5 SOLUTION INTRAVENOUS 2 TIMES DAILY
Status: COMPLETED | OUTPATIENT
Start: 2021-07-24 | End: 2021-07-24

## 2021-07-24 RX ADMIN — Medication 250 MG: at 17:24

## 2021-07-24 RX ADMIN — SODIUM BICARBONATE 650 MG TABLET 1300 MG: at 08:24

## 2021-07-24 RX ADMIN — ALBUMIN (HUMAN) 12.5 G: 0.25 INJECTION, SOLUTION INTRAVENOUS at 14:35

## 2021-07-24 RX ADMIN — RUXOLITINIB 10 MG: 10 TABLET ORAL at 14:35

## 2021-07-24 RX ADMIN — LEVOTHYROXINE SODIUM 75 MCG: 75 TABLET ORAL at 05:10

## 2021-07-24 RX ADMIN — CITALOPRAM HYDROBROMIDE 20 MG: 20 TABLET ORAL at 08:24

## 2021-07-24 RX ADMIN — DEXAMETHASONE SODIUM PHOSPHATE 8 MG: 4 INJECTION INTRA-ARTICULAR; INTRALESIONAL; INTRAMUSCULAR; INTRAVENOUS; SOFT TISSUE at 08:23

## 2021-07-24 RX ADMIN — METOPROLOL TARTRATE 25 MG: 25 TABLET, FILM COATED ORAL at 17:23

## 2021-07-24 RX ADMIN — FAMOTIDINE 20 MG: 20 TABLET ORAL at 08:23

## 2021-07-24 RX ADMIN — MELATONIN TAB 3 MG 3 MG: 3 TAB at 21:46

## 2021-07-24 RX ADMIN — Medication 2000 UNITS: at 08:24

## 2021-07-24 RX ADMIN — Medication 250 MG: at 08:24

## 2021-07-24 RX ADMIN — ALBUMIN (HUMAN) 12.5 G: 0.25 INJECTION, SOLUTION INTRAVENOUS at 20:04

## 2021-07-24 RX ADMIN — CALCITRIOL CAPSULES 0.25 MCG 0.25 MCG: 0.25 CAPSULE ORAL at 08:24

## 2021-07-24 RX ADMIN — METOPROLOL TARTRATE 25 MG: 25 TABLET, FILM COATED ORAL at 08:24

## 2021-07-24 RX ADMIN — HEPARIN SODIUM 3 UNITS/KG/HR: 10000 INJECTION, SOLUTION INTRAVENOUS at 05:11

## 2021-07-24 RX ADMIN — HEPARIN SODIUM 3200 UNITS: 1000 INJECTION INTRAVENOUS; SUBCUTANEOUS at 14:32

## 2021-07-24 RX ADMIN — ATORVASTATIN CALCIUM 40 MG: 40 TABLET, FILM COATED ORAL at 17:24

## 2021-07-24 RX ADMIN — MULTIPLE VITAMINS W/ MINERALS TAB 1 TABLET: TAB ORAL at 08:23

## 2021-07-24 RX ADMIN — DEXAMETHASONE SODIUM PHOSPHATE 8 MG: 4 INJECTION INTRA-ARTICULAR; INTRALESIONAL; INTRAMUSCULAR; INTRAVENOUS; SOFT TISSUE at 20:04

## 2021-07-24 RX ADMIN — SODIUM BICARBONATE 650 MG TABLET 1300 MG: at 17:24

## 2021-07-24 NOTE — ASSESSMENT & PLAN NOTE
Lab Results   Component Value Date    HGB 7 9 (L) 07/24/2021    HGB 8 4 (L) 07/22/2021    HGB 8 2 (L) 07/22/2021   · Chronic, no sign of acute blood loss  · Cont to monitor, transfuse if < 7

## 2021-07-24 NOTE — PROGRESS NOTES
[facility]  Progress Note - Sherren Greenspan [birth year], 77 y o  female MRN: [id number]  Unit/Bed#: -01 Encounter: [id number]  Primary Care Provider: Huy Tanner MD   Date and time admitted to hospital: 7/15/2021 10:52 AM    * COVID-19 virus infection  Assessment & Plan  · Patient presented with fever, generalized weakness, diarrhea decreasing oral intake and difficulty with ambulation for 1 week per admission record review  · COVID-19 pneumonia, unfortunately unvaccinated  · S/p completion of remdesivir x 5 days  · On increased dose of dexamethasone 8mg bid, on gi prophylaxis   · Per pulmonary considering possibility of acemtra  · Abx dcd given low procal x 2  · Worsening d-dimer thus recommended per pulmonary to d/c eliquis and start on heparin gtt  · Received another dose of IV lasix 60mg x 1 7/22; last dose on 7/18  · Cont to trend d-dimer  · Remains on stepdown - low threshold to involve critical care   · Respiratory protocol         Acute respiratory failure with hypoxia ([facility] [address] )  Assessment & Plan  · In the setting of COVID-19 as above  · Plan as per above  · Now on mid flow    Acute renal failure superimposed on stage 5 chronic kidney disease, not on chronic dialysis [facility])  Assessment & Plan  Lab Results   Component Value Date    EGFR 12 07/24/2021    EGFR 11 07/23/2021    EGFR 12 07/22/2021    CREATININE 3 63 (H) 07/24/2021    CREATININE 3 80 (H) 07/23/2021    CREATININE 3 60 (H) 07/22/2021     · Appreciate ongoing Nephrology recs - suspected prerenal azotemia given poor intake and diarrhea +/- component of COVID-19  · Cr baseline 3 4-3 8, follows with Dr Cherry Cedeño  · Initially received IVF followed by one time dose of lasix 20mg IV   · Nephrology following   · Given dose of IV albumin today      Febrile illness  Assessment & Plan  · In the setting of COVID infection, plan as per above  · C  diff culture negative  · Urine culture with mixed contaminant x4  · Blood cultures negative x2 a  · Procal negative x2  Off abx    Class 2 severe obesity due to excess calories with serious comorbidity and body mass index (BMI) of 35 0 to 35 9 in adult [facility])  Assessment & Plan  Body mass index is 35 39 kg/m²  · Nutrition consult once improved from above    History of Clostridioides difficile colitis  Assessment & Plan  · Diarrhea likely secondary to covid 19 infection  · Noted history of C  Diff  · C  Diff testing was negative on admission, however patient did received IV abx  Received PO Vanco for prophylaxis for 3 days post last dose of IV abx    Anemia in CKD (chronic kidney disease)  Assessment & Plan  Lab Results   Component Value Date    HGB 7 9 (L) 07/24/2021    HGB 8 4 (L) 07/22/2021    HGB 8 2 (L) 07/22/2021   · Chronic, no sign of acute blood loss  · Cont to monitor, transfuse if < 7     Polycythemia vera ([facility] [address] )  Assessment & Plan  · Patient follows up with outpatient Hematology Oncology, currently on Jakafi     Obstructive uropathy  Assessment & Plan  · S/p ileostomy for nonfunctioning bladder and chronic hydro  · OP urology follow up    Chronic saddle pulmonary embolism ([facility] [address] )  Assessment & Plan  · On heparin gtt; switched due to elevated d-dimer  · Still remains elevated      VTE Pharmacologic Prophylaxis:   Pharmacologic: Heparin Drip  Mechanical VTE Prophylaxis in Place: Yes    Patient Centered Rounds: I have performed bedside rounds with nursing staff today  Discussions with Specialists or Other Care Team Provider:     Education and Discussions with Family / Patient: Patient, called pt's son    Time Spent for Care: 30 minutes  More than 50% of total time spent on counseling and coordination of care as described above      Current Length of Stay: 9 day(s)    Current Patient Status: Inpatient   Certification Statement: The patient will continue to require additional inpatient hospital stay due to COVID pna, acute hypoxic resp failure    Discharge Plan:     Code Status: Level 1 - Full Code      Subjective:   Anxious ab her son who is now presently intubated with covid pna  Provided comfort to her  Poor po appetite  Still remains on midflow but had to be bumped up to 12L <-- 10L  Denies dyspnea, no sign of resp distress  Earlier in am was mildly confused per RN but now appropriate on my exam likely secondary to steroids    Objective:     Vitals:   Temp (24hrs), Av 6 °F (36 4 °C), Min:97 °F (36 1 °C), Max:97 8 °F (36 6 °C)    Temp:  [97 °F (36 1 °C)-97 8 °F (36 6 °C)] 97 8 °F (36 6 °C)  HR:  [56-68] 68  Resp:  [20] 20  BP: (118-133)/(64-90) 122/64  SpO2:  [85 %-96 %] 95 %  Body mass index is 35 39 kg/m²  Input and Output Summary (last 24 hours): Intake/Output Summary (Last 24 hours) at 2021 1726  Last data filed at 2021 1435  Gross per 24 hour   Intake 318 32 ml   Output 650 ml   Net -331 68 ml       Physical Exam:     Physical Exam  Constitutional:       Appearance: She is obese  Cardiovascular:      Rate and Rhythm: Normal rate and regular rhythm  Pulses: Normal pulses  Heart sounds: Normal heart sounds  Pulmonary:      Effort: Pulmonary effort is normal       Comments: Diminished breath sounds  Abdominal:      General: Abdomen is flat  Bowel sounds are normal  There is no distension  Palpations: Abdomen is soft  Tenderness: There is no abdominal tenderness  There is no guarding  Musculoskeletal:         General: Normal range of motion  Cervical back: Normal range of motion and neck supple  Right lower leg: No edema  Left lower leg: No edema  Skin:     General: Skin is warm  Neurological:      General: No focal deficit present  Mental Status: She is alert and oriented to person, place, and time  Mental status is at baseline  Cranial Nerves: No cranial nerve deficit  Motor: No weakness         Additional Data:     Labs:    Results from last 7 days   Lab Units 21  0521   WBC Thousand/uL 7 27 HEMOGLOBIN g/dL 7 9*   HEMATOCRIT % 24 3*   PLATELETS Thousands/uL 261     Results from last 7 days   Lab Units 07/24/21  0521 07/20/21  0530   SODIUM mmol/L 138 138   POTASSIUM mmol/L 4 2 3 5   CHLORIDE mmol/L 104 108   CO2 mmol/L 24 20*   BUN mg/dL 73* 42*   CREATININE mg/dL 3 63* 3 67*   ANION GAP mmol/L 10 10   CALCIUM mg/dL 8 2* 8 1*   ALBUMIN g/dL  --  2 3*   TOTAL BILIRUBIN mg/dL  --  0 56   ALK PHOS U/L  --  49   ALT U/L  --  9*   AST U/L  --  44   GLUCOSE RANDOM mg/dL 114 144*     Results from last 7 days   Lab Units 07/22/21  0937   INR  1 36*             Results from last 7 days   Lab Units 07/20/21  0530 07/19/21  0637   PROCALCITONIN ng/ml 0 17 0 11           * I Have Reviewed All Lab Data Listed Above  * Additional Pertinent Lab Tests Reviewed:  All Labs Within Last 24 Hours Reviewed    Imaging:    Imaging Reports Reviewed Today Include:   Imaging Personally Reviewed by Myself Includes:    Recent Cultures (last 7 days):           Last 24 Hours Medication List:   Current Facility-Administered Medications   Medication Dose Route Frequency Provider Last Rate    acetaminophen  650 mg Oral Q4H PRN Raysa Mt, DO      albumin human  12 5 g Intravenous BID Cait [place], DO      albuterol  2 puff Inhalation Q4H PRN Julio Calhoun MD      atorvastatin  40 mg Oral Daily With Dizmo, DO      calcitriol  0 25 mcg Oral Daily [facility], DO      cholecalciferol  2,000 Units Oral Daily [facility], DO      citalopram  20 mg Oral Daily Raysa Mt, DO      dexamethasone  8 mg Intravenous Q12H [address] Sharona Villalta MD      famotidine  20 mg Oral Daily Raysa Mt, DO      heparin (porcine)  3-30 Units/kg/hr (Order-Specific) Intravenous Titrated Marlinda Doles, DO 5 Units/kg/hr (07/24/21 1430)    heparin (porcine)  3,200 Units Intravenous Q1H PRN Marlinda Doles, DO      heparin (porcine)  6,400 Units Intravenous Q1H PRN Marlinda Doles, DO      levothyroxine  75 mcg Oral Daily Isaías Rice DO Roddy      melatonin  3 mg Oral HS Mitali Mejia DO      metoprolol tartrate  25 mg Oral BID Mitali Mejia DO      multivitamin-minerals  1 tablet Oral Daily Mitali Mejia DO      ondansetron  4 mg Intravenous Q4H PRN Mitali Mejia DO      ruxolitinib  10 mg Oral Every Other Day Mitali Mejia DO      saccharomyces boulardii  250 mg Oral BID Thom Diss, CRNP      sodium bicarbonate  1,300 mg Oral BID after meals Ольга Richardson DO          Today, Patient Was Seen By: Marli Spence DO    ** Please Note: Dictation voice to text software may have been used in the creation of this document   **

## 2021-07-24 NOTE — PROGRESS NOTES
NEPHROLOGY PROGRESS NOTE   Sherren Greenspan 77 y o  female MRN: [id number]  Unit/Bed#: -01 Encounter: [id number]      ASSESSMENT & PLAN    1  LUANN (POA) on CKD likely pre renal azotemia that has progressed to ATN plus minus component of COVID-19 on stage 5 chronic kidney disease  ? Creatinine was as high as 4 09 and now  stable  ? Consent for dialysis was obtained by my colleague  ? Baseline creatinine has been in the mid threes  ? Electrolytes and acid-base status are stable  ? Will monitor closely for the initiation of renal replacement therapy  ? repsonding well to intermittant lasix received two doses last week  ? Has left-sided hydronephrosis with the recent history of left-sided nephrostomy tube and solitary kidney function with left renal atrophy  ? Poor po intake, will give albumin 12 5 g x 2 doses today     2  Electrolytes/Acid Base  ? Severe metabolic acidosis-continue to monitor-currently on sodium bicarbonate will hold off on up titrating dose for now     3  Blood Pressure  ? Blood pressures are currently stable  ? Avoid hypotension  ? Currently on metoprolol 25 mg 2 times daily     4  Anemia of CKD  ? Hemoglobins are stable, platelet count stable will monitor     5  CKD-BMD  ? Continue calcitriol for secondary hyperparathyroidism on vitamin D3 as well 2000 units  ? Monitor phosphorus     6  Clinical Course/CV Risk Reduction/Health maintenance/communication  ?  COVID-19 pneumonia-ongoing treatment per primary      SUBJECTIVE:    Patient was seen today   flat affect  Breathing improved  Poor po intake  No cp,     OBJECTIVE:  Current Weight: Weight - Scale: 82 2 kg (181 lb 3 5 oz)  @  Vitals:    07/23/21 2300 07/24/21 0322 07/24/21 0524 07/24/21 0855   BP:  133/71 118/71    BP Location:  Left arm Left arm    Pulse:  58 58    Resp: 20 20 20    Temp: (!) 97 °F (36 1 °C) 97 6 °F (36 4 °C) 97 8 °F (36 6 °C)    TempSrc: Axillary Oral Oral    SpO2:  93% 95% 95%   Weight:       Height: Intake/Output Summary (Last 24 hours) at 7/24/2021 1215  Last data filed at 7/24/2021 3307  Gross per 24 hour   Intake 268 32 ml   Output 850 ml   Net -581 68 ml     Weight (last 2 days)     None          General: conscious, cooperative, in no acute distress  Eyes: conjunctivae pink, anicteric sclerae  ENT: lips and mucous membranes moist  Neck: supple, no JVD  Chest: no respiratory distress, no accessory muscle use, normal respiratory effort  CVS: normal heart rate, no friction rub  Abdomen: soft, non-tender, non-distended, normoactive bowel sounds  Extremities: trace edema  Skin: no rash  Neuro: awake, alert, oriented      Medications:    Current Facility-Administered Medications:     acetaminophen (TYLENOL) tablet 650 mg, 650 mg, Oral, Q4H PRN, Ora Barer, DO    albuterol (PROVENTIL HFA,VENTOLIN HFA) inhaler 2 puff, 2 puff, Inhalation, Q4H PRN, Vicky Hanna MD    atorvastatin (LIPITOR) tablet 40 mg, 40 mg, Oral, Daily With Abel Leon, , 40 mg at 07/23/21 1753    calcitriol (ROCALTROL) capsule 0 25 mcg, 0 25 mcg, Oral, Daily, Ora Barer, DO, 0 25 mcg at 07/24/21 7139    cholecalciferol (VITAMIN D3) tablet 2,000 Units, 2,000 Units, Oral, Daily, Ora Barer, DO, 2,000 Units at 07/24/21 0824    citalopram (CeleXA) tablet 20 mg, 20 mg, Oral, Daily, Ora Barer, DO, 20 mg at 07/24/21 0824    dexamethasone (DECADRON) injection 8 mg, 8 mg, Intravenous, Q12H [address], Lorraine Brannon MD, 8 mg at 07/24/21 3188    famotidine (PEPCID) tablet 20 mg, 20 mg, Oral, Daily, Ora Barer, DO, 20 mg at 07/24/21 7480    heparin (porcine) 25,000 units in 0 45% NaCl 250 mL infusion (premix), 3-30 Units/kg/hr (Order-Specific), Intravenous, Titrated, Trena DO Lester, Last Rate: 2 4 mL/hr at 07/24/21 0511, 3 Units/kg/hr at 07/24/21 0511    heparin (porcine) injection 3,200 Units, 3,200 Units, Intravenous, Q1H PRN, Trena Mews, DO    heparin (porcine) injection 6,400 Units, 6,400 Units, Intravenous, Q1H PRN, Joon Kellogg, DO    levothyroxine tablet 75 mcg, 75 mcg, Oral, Daily, Nam Aliment, DO, 75 mcg at 07/24/21 0510    melatonin tablet 3 mg, 3 mg, Oral, HS, Nam Aliment, DO, 3 mg at 07/23/21 2114    metoprolol tartrate (LOPRESSOR) tablet 25 mg, 25 mg, Oral, BID, Nam Aliment, DO, 25 mg at 07/24/21 0346    [COMPLETED] zinc sulfate (ZINCATE) capsule 220 mg, 220 mg, Oral, Daily, 220 mg at 07/21/21 0859 **FOLLOWED BY** multivitamin-minerals (CENTRUM ADULTS) tablet 1 tablet, 1 tablet, Oral, Daily, Nam Aliment, DO, 1 tablet at 07/24/21 6679    ondansetron [facility]) injection 4 mg, 4 mg, Intravenous, Q4H PRN, Nam Aliment, DO, 4 mg at 07/17/21 0845    ruxolitinib (JAKAFI) tablet 10 mg, 10 mg, Oral, Every Other Day, Nam Aliment, DO, 10 mg at 07/22/21 1220    saccharomyces boulardii (FLORASTOR) capsule 250 mg, 250 mg, Oral, BID, Alysia Kauffman, VICTORINA, 250 mg at 07/24/21 5640    sodium bicarbonate tablet 1,300 mg, 1,300 mg, Oral, BID after meals, rBody Swift, DO, 1,300 mg at 07/24/21 7597    Invasive Devices:      Lab Results:   Results from last 7 days   Lab Units 07/24/21  0521 07/23/21  0523 07/22/21  0937 07/22/21  0602 07/21/21  0439 07/20/21  0530 07/19/21  0637 07/18/21  0459   WBC Thousand/uL 7 27  --  7 65 6 92 4 18* 2 73* 3 19* 2 68*   HEMOGLOBIN g/dL 7 9*  --  8 4* 8 2* 8 0* 8 7* 8 8* 8 7*   HEMATOCRIT % 24 3*  --  26 3* 25 3* 24 6* 26 5* 27 4* 27 3*   PLATELETS Thousands/uL 261  --  256 237 246 234 227 216   POTASSIUM mmol/L 4 2 3 7  --  3 8 3 5 3 5 3 6 3 1*   CHLORIDE mmol/L 104 101  --  107 109* 108 108 108   CO2 mmol/L 24 22  --  20* 22 20* 21 20*   BUN mg/dL 73* 67*  --  61* 54* 42* 38* 34*   CREATININE mg/dL 3 63* 3 80*  --  3 60* 3 77* 3 67* 3 79* 3 58*   CALCIUM mg/dL 8 2* 8 2*  --  8 0* 8 0* 8 1* 7 8* 7 5*   PHOSPHORUS mg/dL  --   --   --   --   --   --   --  2 8   ALK PHOS U/L  --   --   --   --   --  49 44* 42*   ALT U/L  --   --   --   --   --  9* 11* 9*   AST U/L  --   -- --   --   --  40 43 41         Portions of the record may have been created with voice recognition software  Occasional wrong word or "sound a like" substitutions may have occurred due to the inherent limitations of voice recognition software  Read the chart carefully and recognize, using context, where substitutions have occurred  If you have any questions, please contact the dictating provider

## 2021-07-24 NOTE — ASSESSMENT & PLAN NOTE
Lab Results   Component Value Date    EGFR 11 07/23/2021    EGFR 12 07/22/2021    EGFR 11 07/21/2021    CREATININE 3 80 (H) 07/23/2021    CREATININE 3 60 (H) 07/22/2021    CREATININE 3 77 (H) 07/21/2021     · Appreciate ongoing Nephrology recs - suspected prerenal azotemia given poor intake and diarrhea +/- component of COVID-19  · Cr baseline 3 4-3 8, follows with Dr Cindy Mosley  · Initially received IVF followed by one time dose of lasix 20mg IV   · Nephrology following

## 2021-07-24 NOTE — ASSESSMENT & PLAN NOTE
Lab Results   Component Value Date    EGFR 12 07/24/2021    EGFR 11 07/23/2021    EGFR 12 07/22/2021    CREATININE 3 63 (H) 07/24/2021    CREATININE 3 80 (H) 07/23/2021    CREATININE 3 60 (H) 07/22/2021     · Appreciate ongoing Nephrology recs - suspected prerenal azotemia given poor intake and diarrhea +/- component of COVID-19  · Cr baseline 3 4-3 8, follows with Dr Perez Meter  · Initially received IVF followed by one time dose of lasix 20mg IV   · Nephrology following   · Given dose of IV albumin today

## 2021-07-24 NOTE — PROGRESS NOTES
[facility]  Progress Note - Sofi Coffey [birth year], 77 y o  female MRN: [id number]  Unit/Bed#: -01 Encounter: [id number]  Primary Care Provider: Marquis Guerra MD   Date and time admitted to hospital: 7/15/2021 10:52 AM    * COVID-19 virus infection  Assessment & Plan  · Patient presented with fever, generalized weakness, diarrhea decreasing oral intake and difficulty with ambulation for 1 week per admission record review  · COVID-19 pneumonia, unfortunately unvaccinated  · S/p completion of remdesivir x 5 days  · On increased dose of dexamethasone 8mg bid, on gi prophylaxis   · Per pulmonary considering possibility of acemtra  · Abx dcd given low procal x 2  · Worsening d-dimer thus recommended per pulmonary to d/c eliquis and start on heparin gtt  · Received another dose of IV lasix 60mg x 1 7/22; last dose on 7/18  · Cont to trend d-dimer  · Remains on stepdown - low threshold to involve critical care   · Respiratory protocol         Acute respiratory failure with hypoxia ([facility] [address] )  Assessment & Plan  · In the setting of COVID-19 as above  · Plan as per above  · Now on mid flow    Acute renal failure superimposed on stage 5 chronic kidney disease, not on chronic dialysis [facility])  Assessment & Plan  Lab Results   Component Value Date    EGFR 11 07/23/2021    EGFR 12 07/22/2021    EGFR 11 07/21/2021    CREATININE 3 80 (H) 07/23/2021    CREATININE 3 60 (H) 07/22/2021    CREATININE 3 77 (H) 07/21/2021     · Appreciate ongoing Nephrology recs - suspected prerenal azotemia given poor intake and diarrhea +/- component of COVID-19  · Cr baseline 3 4-3 8, follows with Dr Belle Jiménez  · Initially received IVF followed by one time dose of lasix 20mg IV   · Nephrology following       Febrile illness  Assessment & Plan  · In the setting of COVID infection, plan as per above  · C  diff culture negative  · Urine culture with mixed contaminant x4  · Blood cultures negative x2 a  · Procal negative x2  Off abx    Class 2 severe obesity due to excess calories with serious comorbidity and body mass index (BMI) of 35 0 to 35 9 in adult [facility])  Assessment & Plan  Body mass index is 35 39 kg/m²  · Nutrition consult once improved from above    History of Clostridioides difficile colitis  Assessment & Plan  · Diarrhea likely secondary to covid 19 infection  · Noted history of C  Diff  · C  Diff testing was negative on admission, however patient did received IV abx  Received PO Vanco for prophylaxis for 3 days post last dose of IV abx    Anemia in CKD (chronic kidney disease)  Assessment & Plan  Lab Results   Component Value Date    HGB 8 4 (L) 07/22/2021    HGB 8 2 (L) 07/22/2021    HGB 8 0 (L) 07/21/2021   · Chronic, no sign of acute blood loss  · Cont to monitor, transfuse if < 7     Polycythemia vera ([facility] [address] )  Assessment & Plan  · Patient follows up with outpatient Hematology Oncology, currently on Jakafi     Obstructive uropathy  Assessment & Plan  · S/p ileostomy for nonfunctioning bladder and chronic hydro  · OP urology follow up    Chronic saddle pulmonary embolism ([facility] [address] )  Assessment & Plan  · On heparin gtt; switched due to elevated d-dimer  · Will re-check d-dimer tomorrow      VTE Pharmacologic Prophylaxis:   Pharmacologic: Heparin Drip  Mechanical VTE Prophylaxis in Place: No    Patient Centered Rounds: I have performed bedside rounds with nursing staff today  Discussions with Specialists or Other Care Team Provider:    Education and Discussions with Family / Patient: Patient, called her dtr and son to update    Time Spent for Care: 30 minutes  More than 50% of total time spent on counseling and coordination of care as described above      Current Length of Stay: 8 day(s)    Current Patient Status: Inpatient   Certification Statement: The patient will continue to require additional inpatient hospital stay due to COVID pneumonia, acute hypoxic respiratory failure, acute kidney injury    Discharge Plan:     Code Status: Level 1 - Full Code      Subjective:   Poor appetite  Was transiently confused in am likely secondary to steroids but now improved  Titrated down to mid flow    Objective:     Vitals:   Temp (24hrs), Av 8 °F (36 6 °C), Min:97 °F (36 1 °C), Max:98 4 °F (36 9 °C)    Temp:  [97 °F (36 1 °C)-98 4 °F (36 9 °C)] 97 8 °F (36 6 °C)  HR:  [58-62] 62  BP: (120-133)/(70-72) 120/70  SpO2:  [84 %-98 %] 96 %  Body mass index is 35 39 kg/m²  Input and Output Summary (last 24 hours): Intake/Output Summary (Last 24 hours) at 2021  Last data filed at 2021 1500  Gross per 24 hour   Intake 213 28 ml   Output 950 ml   Net -736 72 ml       Physical Exam:     Physical Exam  Cardiovascular:      Rate and Rhythm: Normal rate and regular rhythm  Pulses: Normal pulses  Heart sounds: Normal heart sounds  Pulmonary:      Effort: Pulmonary effort is normal       Breath sounds: No wheezing or rales  Comments: Diminished bs  Abdominal:      General: Abdomen is flat  Bowel sounds are normal  There is no distension  Palpations: Abdomen is soft  Tenderness: There is no abdominal tenderness  There is no guarding  Comments: Positive ileostomy   Musculoskeletal:         General: Normal range of motion  Cervical back: Normal range of motion and neck supple  Right lower leg: No edema  Left lower leg: No edema  Skin:     General: Skin is warm and dry  Neurological:      General: No focal deficit present  Mental Status: She is alert and oriented to person, place, and time  Mental status is at baseline  Cranial Nerves: No cranial nerve deficit  Motor: No weakness             Additional Data:     Labs:    Results from last 7 days   Lab Units 21  0937   WBC Thousand/uL 7 65   HEMOGLOBIN g/dL 8 4*   HEMATOCRIT % 26 3*   PLATELETS Thousands/uL 256     Results from last 7 days   Lab Units 21  0523 21  0530   SODIUM mmol/L 137 138 POTASSIUM mmol/L 3 7 3 5   CHLORIDE mmol/L 101 108   CO2 mmol/L 22 20*   BUN mg/dL 67* 42*   CREATININE mg/dL 3 80* 3 67*   ANION GAP mmol/L 14* 10   CALCIUM mg/dL 8 2* 8 1*   ALBUMIN g/dL  --  2 3*   TOTAL BILIRUBIN mg/dL  --  0 56   ALK PHOS U/L  --  49   ALT U/L  --  9*   AST U/L  --  44   GLUCOSE RANDOM mg/dL 135 144*     Results from last 7 days   Lab Units 07/22/21  0937   INR  1 36*             Results from last 7 days   Lab Units 07/20/21  0530 07/19/21  0637   PROCALCITONIN ng/ml 0 17 0 11           * I Have Reviewed All Lab Data Listed Above  * Additional Pertinent Lab Tests Reviewed:  All Labs Within Last 24 Hours Reviewed    Imaging:    Imaging Reports Reviewed Today Include:   Imaging Personally Reviewed by Myself Includes:      Recent Cultures (last 7 days):           Last 24 Hours Medication List:   Current Facility-Administered Medications   Medication Dose Route Frequency Provider Last Rate    acetaminophen  650 mg Oral Q4H PRN Leopoldo Conquest, DO      albuterol  2 puff Inhalation Q4H PRN Rut Piña MD      atorvastatin  40 mg Oral Daily With Miryam Selby, DO      calcitriol  0 25 mcg Oral Daily Leopoldo Conquest, DO      cholecalciferol  2,000 Units Oral Daily Leopoldo Conquest, DO      citalopram  20 mg Oral Daily Leopoldo Conquest, DO      dexamethasone  8 mg Intravenous Q12H [address] Polina Galeano MD      famotidine  20 mg Oral Daily Leopoldo Conquest, DO      heparin (porcine)  3-30 Units/kg/hr (Order-Specific) Intravenous Titrated Nataly Schools, DO Stopped (07/23/21 2008)    heparin (porcine)  3,200 Units Intravenous Q1H PRN Nataly Schools, DO      heparin (porcine)  6,400 Units Intravenous Q1H PRN Nataly Schools, DO      levothyroxine  75 mcg Oral Daily Leopoldo Conquest, DO      melatonin  3 mg Oral HS Leopoldo Conquest, DO      metoprolol tartrate  25 mg Oral BID Leopoldo Conquest, DO      multivitamin-minerals  1 tablet Oral Daily Leopoldo Conquest, DO      ondansetron  4 mg Intravenous Q4H PRN Glory Lay DO Roddy      ruxolitinib  10 mg Oral Every Other Day Alex Talley DO      saccharomyces boulardii  250 mg Oral BID VICTORINA Sanchez      sodium bicarbonate  1,300 mg Oral BID after meals Angelina Craig DO          Today, Patient Was Seen By: Lynnette Jones DO    ** Please Note: Dictation voice to text software may have been used in the creation of this document   **

## 2021-07-25 LAB
ABO GROUP BLD: NORMAL
ANION GAP SERPL CALCULATED.3IONS-SCNC: 7 MMOL/L (ref 4–13)
APTT PPP: 63 SECONDS (ref 23–37)
BLD GP AB SCN SERPL QL: NEGATIVE
BUN SERPL-MCNC: 76 MG/DL (ref 5–25)
CALCIUM SERPL-MCNC: 7.7 MG/DL (ref 8.3–10.1)
CHLORIDE SERPL-SCNC: 103 MMOL/L (ref 100–108)
CO2 SERPL-SCNC: 26 MMOL/L (ref 21–32)
CREAT SERPL-MCNC: 3.58 MG/DL (ref 0.6–1.3)
ERYTHROCYTE [DISTWIDTH] IN BLOOD BY AUTOMATED COUNT: 15.3 % (ref 11.6–15.1)
GFR SERPL CREATININE-BSD FRML MDRD: 12 ML/MIN/1.73SQ M
GLUCOSE SERPL-MCNC: 127 MG/DL (ref 65–140)
HCT VFR BLD AUTO: 22.4 % (ref 34.8–46.1)
HGB BLD-MCNC: 7.2 G/DL (ref 11.5–15.4)
MCH RBC QN AUTO: 29.3 PG (ref 26.8–34.3)
MCHC RBC AUTO-ENTMCNC: 32.1 G/DL (ref 31.4–37.4)
MCV RBC AUTO: 91 FL (ref 82–98)
PLATELET # BLD AUTO: 246 THOUSANDS/UL (ref 149–390)
PMV BLD AUTO: 10.1 FL (ref 8.9–12.7)
POTASSIUM SERPL-SCNC: 4.4 MMOL/L (ref 3.5–5.3)
RBC # BLD AUTO: 2.46 MILLION/UL (ref 3.81–5.12)
RH BLD: POSITIVE
SODIUM SERPL-SCNC: 136 MMOL/L (ref 136–145)
SPECIMEN EXPIRATION DATE: NORMAL
WBC # BLD AUTO: 6.01 THOUSAND/UL (ref 4.31–10.16)

## 2021-07-25 PROCEDURE — 80048 BASIC METABOLIC PNL TOTAL CA: CPT | Performed by: INTERNAL MEDICINE

## 2021-07-25 PROCEDURE — 85027 COMPLETE CBC AUTOMATED: CPT | Performed by: INTERNAL MEDICINE

## 2021-07-25 PROCEDURE — 85730 THROMBOPLASTIN TIME PARTIAL: CPT | Performed by: INTERNAL MEDICINE

## 2021-07-25 PROCEDURE — 86901 BLOOD TYPING SEROLOGIC RH(D): CPT | Performed by: INTERNAL MEDICINE

## 2021-07-25 PROCEDURE — 94760 N-INVAS EAR/PLS OXIMETRY 1: CPT

## 2021-07-25 PROCEDURE — 86900 BLOOD TYPING SEROLOGIC ABO: CPT | Performed by: INTERNAL MEDICINE

## 2021-07-25 PROCEDURE — 94760 N-INVAS EAR/PLS OXIMETRY 1: CPT | Performed by: SOCIAL WORKER

## 2021-07-25 PROCEDURE — 99233 SBSQ HOSP IP/OBS HIGH 50: CPT | Performed by: INTERNAL MEDICINE

## 2021-07-25 PROCEDURE — 86850 RBC ANTIBODY SCREEN: CPT | Performed by: INTERNAL MEDICINE

## 2021-07-25 RX ORDER — FUROSEMIDE 10 MG/ML
60 INJECTION INTRAMUSCULAR; INTRAVENOUS ONCE
Status: COMPLETED | OUTPATIENT
Start: 2021-07-25 | End: 2021-07-25

## 2021-07-25 RX ORDER — AMOXICILLIN 250 MG
2 CAPSULE ORAL
Status: DISCONTINUED | OUTPATIENT
Start: 2021-07-25 | End: 2021-08-03 | Stop reason: HOSPADM

## 2021-07-25 RX ADMIN — FUROSEMIDE 60 MG: 10 INJECTION, SOLUTION INTRAMUSCULAR; INTRAVENOUS at 14:40

## 2021-07-25 RX ADMIN — SENNOSIDES AND DOCUSATE SODIUM 2 TABLET: 8.6; 5 TABLET ORAL at 21:35

## 2021-07-25 RX ADMIN — SODIUM BICARBONATE 650 MG TABLET 1300 MG: at 08:08

## 2021-07-25 RX ADMIN — ATORVASTATIN CALCIUM 40 MG: 40 TABLET, FILM COATED ORAL at 18:00

## 2021-07-25 RX ADMIN — MULTIPLE VITAMINS W/ MINERALS TAB 1 TABLET: TAB ORAL at 08:08

## 2021-07-25 RX ADMIN — DEXAMETHASONE SODIUM PHOSPHATE 8 MG: 4 INJECTION INTRA-ARTICULAR; INTRALESIONAL; INTRAMUSCULAR; INTRAVENOUS; SOFT TISSUE at 08:09

## 2021-07-25 RX ADMIN — METOPROLOL TARTRATE 25 MG: 25 TABLET, FILM COATED ORAL at 18:00

## 2021-07-25 RX ADMIN — METOPROLOL TARTRATE 25 MG: 25 TABLET, FILM COATED ORAL at 08:09

## 2021-07-25 RX ADMIN — FAMOTIDINE 20 MG: 20 TABLET ORAL at 08:09

## 2021-07-25 RX ADMIN — Medication 250 MG: at 17:58

## 2021-07-25 RX ADMIN — Medication 250 MG: at 08:08

## 2021-07-25 RX ADMIN — LEVOTHYROXINE SODIUM 75 MCG: 75 TABLET ORAL at 06:28

## 2021-07-25 RX ADMIN — DEXAMETHASONE SODIUM PHOSPHATE 8 MG: 4 INJECTION INTRA-ARTICULAR; INTRALESIONAL; INTRAMUSCULAR; INTRAVENOUS; SOFT TISSUE at 21:35

## 2021-07-25 RX ADMIN — MELATONIN TAB 3 MG 3 MG: 3 TAB at 21:35

## 2021-07-25 RX ADMIN — Medication 2000 UNITS: at 08:09

## 2021-07-25 RX ADMIN — CITALOPRAM HYDROBROMIDE 20 MG: 20 TABLET ORAL at 08:09

## 2021-07-25 RX ADMIN — CALCITRIOL CAPSULES 0.25 MCG 0.25 MCG: 0.25 CAPSULE ORAL at 08:09

## 2021-07-25 RX ADMIN — SODIUM BICARBONATE 650 MG TABLET 1300 MG: at 17:58

## 2021-07-25 NOTE — PROGRESS NOTES
Patient with right arm edema  Positive thrill and bruit with av fistula  Patient very depressed today  Lips circumorocyanotic  Needing increase in oxygen today  Respiratory therapy aware and in room  Increased third spaced edema  Patient very lethargic and feeling quote    miserable, I wish I could die    end quote  Support offered and accepted  diversional activities of coloring and puzzles given  Patient is able to make needs known  Increased resp effort noted  Emotional expression encouraged  Active listening engaged  Reached out to dr Germania Earl and lasix ordered IV  Urostomy now flowing with clear yellow urine and after lasix patient much improved sats and less resp effort noted  Call bell in reach  Patient verbalized much emotional relief at the end of shift today

## 2021-07-25 NOTE — TREATMENT PLAN
Review chart  Discussed with slim  Creatinine is stable at 3 58  Oxygen requirements between 12-15 liters/minutes  Will give 60 mg IV Lasix today has been getting other other day with good response  Monitor urine  Strict I's and daily weight

## 2021-07-26 PROBLEM — R60.0 EDEMA OF RIGHT UPPER EXTREMITY: Status: ACTIVE | Noted: 2021-07-26

## 2021-07-26 LAB
ANION GAP SERPL CALCULATED.3IONS-SCNC: 10 MMOL/L (ref 4–13)
APTT PPP: 43 SECONDS (ref 23–37)
APTT PPP: 94 SECONDS (ref 23–37)
BUN SERPL-MCNC: 81 MG/DL (ref 5–25)
CALCIUM SERPL-MCNC: 8.1 MG/DL (ref 8.3–10.1)
CHLORIDE SERPL-SCNC: 100 MMOL/L (ref 100–108)
CO2 SERPL-SCNC: 26 MMOL/L (ref 21–32)
CREAT SERPL-MCNC: 3.63 MG/DL (ref 0.6–1.3)
ERYTHROCYTE [DISTWIDTH] IN BLOOD BY AUTOMATED COUNT: 15.4 % (ref 11.6–15.1)
GFR SERPL CREATININE-BSD FRML MDRD: 12 ML/MIN/1.73SQ M
GLUCOSE SERPL-MCNC: 132 MG/DL (ref 65–140)
HCT VFR BLD AUTO: 24.9 % (ref 34.8–46.1)
HGB BLD-MCNC: 8.3 G/DL (ref 11.5–15.4)
MCH RBC QN AUTO: 30.2 PG (ref 26.8–34.3)
MCHC RBC AUTO-ENTMCNC: 33.3 G/DL (ref 31.4–37.4)
MCV RBC AUTO: 91 FL (ref 82–98)
PLATELET # BLD AUTO: 293 THOUSANDS/UL (ref 149–390)
PMV BLD AUTO: 10.1 FL (ref 8.9–12.7)
POTASSIUM SERPL-SCNC: 4.5 MMOL/L (ref 3.5–5.3)
RBC # BLD AUTO: 2.75 MILLION/UL (ref 3.81–5.12)
SODIUM SERPL-SCNC: 136 MMOL/L (ref 136–145)
WBC # BLD AUTO: 8.3 THOUSAND/UL (ref 4.31–10.16)

## 2021-07-26 PROCEDURE — 80048 BASIC METABOLIC PNL TOTAL CA: CPT | Performed by: INTERNAL MEDICINE

## 2021-07-26 PROCEDURE — 99232 SBSQ HOSP IP/OBS MODERATE 35: CPT | Performed by: INTERNAL MEDICINE

## 2021-07-26 PROCEDURE — 94760 N-INVAS EAR/PLS OXIMETRY 1: CPT

## 2021-07-26 PROCEDURE — 85027 COMPLETE CBC AUTOMATED: CPT | Performed by: INTERNAL MEDICINE

## 2021-07-26 PROCEDURE — 85730 THROMBOPLASTIN TIME PARTIAL: CPT | Performed by: INTERNAL MEDICINE

## 2021-07-26 PROCEDURE — 97110 THERAPEUTIC EXERCISES: CPT

## 2021-07-26 PROCEDURE — 99233 SBSQ HOSP IP/OBS HIGH 50: CPT | Performed by: INTERNAL MEDICINE

## 2021-07-26 RX ADMIN — Medication 250 MG: at 09:03

## 2021-07-26 RX ADMIN — CALCITRIOL CAPSULES 0.25 MCG 0.25 MCG: 0.25 CAPSULE ORAL at 09:03

## 2021-07-26 RX ADMIN — Medication 250 MG: at 17:19

## 2021-07-26 RX ADMIN — HEPARIN SODIUM 7 UNITS/KG/HR: 10000 INJECTION, SOLUTION INTRAVENOUS at 17:15

## 2021-07-26 RX ADMIN — METOPROLOL TARTRATE 25 MG: 25 TABLET, FILM COATED ORAL at 17:15

## 2021-07-26 RX ADMIN — DEXAMETHASONE SODIUM PHOSPHATE 8 MG: 4 INJECTION INTRA-ARTICULAR; INTRALESIONAL; INTRAMUSCULAR; INTRAVENOUS; SOFT TISSUE at 08:50

## 2021-07-26 RX ADMIN — SENNOSIDES AND DOCUSATE SODIUM 2 TABLET: 8.6; 5 TABLET ORAL at 21:23

## 2021-07-26 RX ADMIN — DEXAMETHASONE SODIUM PHOSPHATE 8 MG: 4 INJECTION INTRA-ARTICULAR; INTRALESIONAL; INTRAMUSCULAR; INTRAVENOUS; SOFT TISSUE at 21:23

## 2021-07-26 RX ADMIN — LEVOTHYROXINE SODIUM 75 MCG: 75 TABLET ORAL at 05:36

## 2021-07-26 RX ADMIN — MULTIPLE VITAMINS W/ MINERALS TAB 1 TABLET: TAB ORAL at 08:50

## 2021-07-26 RX ADMIN — METOPROLOL TARTRATE 25 MG: 25 TABLET, FILM COATED ORAL at 08:50

## 2021-07-26 RX ADMIN — SODIUM BICARBONATE 650 MG TABLET 1300 MG: at 09:03

## 2021-07-26 RX ADMIN — Medication 2000 UNITS: at 08:50

## 2021-07-26 RX ADMIN — FAMOTIDINE 20 MG: 20 TABLET ORAL at 08:50

## 2021-07-26 RX ADMIN — RUXOLITINIB 10 MG: 10 TABLET ORAL at 09:03

## 2021-07-26 RX ADMIN — CITALOPRAM HYDROBROMIDE 20 MG: 20 TABLET ORAL at 09:03

## 2021-07-26 RX ADMIN — SODIUM BICARBONATE 650 MG TABLET 1300 MG: at 17:18

## 2021-07-26 RX ADMIN — HEPARIN SODIUM 3200 UNITS: 1000 INJECTION INTRAVENOUS; SUBCUTANEOUS at 08:51

## 2021-07-26 RX ADMIN — MELATONIN TAB 3 MG 3 MG: 3 TAB at 21:23

## 2021-07-26 RX ADMIN — ATORVASTATIN CALCIUM 40 MG: 40 TABLET, FILM COATED ORAL at 17:15

## 2021-07-26 NOTE — ASSESSMENT & PLAN NOTE
· Diarrhea likely secondary to covid 19 infection; resolved  · Noted history of C  Diff  · C  Diff testing was negative on admission, however patient did received IV abx   Received PO Vanco for prophylaxis for 3 days post last dose of IV abx

## 2021-07-26 NOTE — ASSESSMENT & PLAN NOTE
Chronic as + AVF in place but more increased than usual as also confirmed per nephrology  Check upper extremity doppler  Already on heparin gtt

## 2021-07-26 NOTE — ASSESSMENT & PLAN NOTE
Lab Results   Component Value Date    EGFR 12 07/25/2021    EGFR 12 07/24/2021    EGFR 11 07/23/2021    CREATININE 3 58 (H) 07/25/2021    CREATININE 3 63 (H) 07/24/2021    CREATININE 3 80 (H) 07/23/2021     · Appreciate ongoing Nephrology recs - suspected prerenal azotemia given poor intake and diarrhea +/- component of COVID-19  · Cr baseline 3 4-3 8, follows with Dr Sandy Ford  · Initially received IVF followed by one time dose of lasix 20mg IV   · Nephrology following   · On IV lasix EOD

## 2021-07-26 NOTE — CASE MANAGEMENT
Per chart review, pt not yet medically stable and remains on 15L mid flow  OO following for medical stability

## 2021-07-26 NOTE — PLAN OF CARE
Problem: OCCUPATIONAL THERAPY ADULT  Goal: Performs self-care activities at highest level of function for planned discharge setting  See evaluation for individualized goals  Description: Treatment Interventions: ADL retraining, Functional transfer training, UE strengthening/ROM, Endurance training, Cognitive reorientation, Patient/family training, Equipment evaluation/education, Compensatory technique education, Continued evaluation, Energy conservation, Activityengagement          See flowsheet documentation for full assessment, interventions and recommendations  Outcome: Progressing  Note: Limitation: Decreased ADL status, Decreased UE strength, Decreased Safe judgement during ADL, Decreased sensation, Decreased cognition, Decreased self-care trans, Decreased high-level ADLs  Prognosis: Fair  Assessment: Patient participated in Skilled OT session 7/26/2021 with interventions consisting of Energy Conservation techniques, deep breathing technique, safety awareness and fall prevention techniques, therapeutic exercise to: increase functional use of BUEs, increase BUE muscle strength ,  therapeutic activities to: increase activity tolerance, increase standing tolerance time with unilateral UE support to complete sink level ADLs, increase cardiovascular endurance  and increase OOB/ sitting tolerance   Patient agreeable to OT treatment session, upon arrival patient was found seated OOB to Recliner  In comparison to previous session, patient with improvements in transfers, functional mobility, endurance, static/dynamic sitting/standing balance, and verbal engagement  Patient requiring verbal cues for safety, verbal cues for pacing thru activity steps and frequent rest periods  Patient continues to be functioning below baseline level, occupational performance remains limited secondary to factors listed above and increased risk for falls and injury     From OT standpoint, recommendation at time of d/c would be Short Term Rehab when medically stable  Patient to benefit from continued Occupational Therapy treatment while in the hospital to address deficits as defined above and maximize level of functional independence with ADLs and functional mobility        OT Discharge Recommendation: Post acute rehabilitation services  OT - OK to Discharge: Yes (when medically stable)     Imelda Loving MS, OTR/L

## 2021-07-26 NOTE — PROGRESS NOTES
[facility]  Progress Note - Eliana Chamorroling [birth year], 77 y o  female MRN: [id number]  Unit/Bed#: -01 Encounter: [id number]  Primary Care Provider: Arnol Renner MD   Date and time admitted to hospital: 7/15/2021 10:52 AM    * COVID-19 virus infection  Assessment & Plan  · Patient presented with fever, generalized weakness, diarrhea decreasing oral intake and difficulty with ambulation for 1 week per admission record review  · COVID-19 pneumonia, unfortunately unvaccinated  · S/p completion of remdesivir x 5 days  · On increased dose of dexamethasone 8mg bid, on gi prophylaxis   · Per pulmonary considering possibility of acemtra  · Abx dcd given low procal x 2  · Worsening d-dimer thus recommended per pulmonary to d/c eliquis and start on heparin gtt  · Now on IV lasix 60mg every other day  · Cont to trend d-dimer  · Remains on stepdown - low threshold to involve critical care   · Respiratory protocol         Acute respiratory failure with hypoxia ([facility] [address] )  Assessment & Plan  · In the setting of COVID-19 as above  · Plan as per above  · Now on mid flow   Increased to 15L today s/p IV lasix improved pox    Acute renal failure superimposed on stage 5 chronic kidney disease, not on chronic dialysis [facility])  Assessment & Plan  Lab Results   Component Value Date    EGFR 12 07/25/2021    EGFR 12 07/24/2021    EGFR 11 07/23/2021    CREATININE 3 58 (H) 07/25/2021    CREATININE 3 63 (H) 07/24/2021    CREATININE 3 80 (H) 07/23/2021     · Appreciate ongoing Nephrology recs - suspected prerenal azotemia given poor intake and diarrhea +/- component of COVID-19  · Cr baseline 3 4-3 8, follows with Dr Silke Crain  · Initially received IVF followed by one time dose of lasix 20mg IV   · Nephrology following   · On IV lasix EOD      Febrile illness  Assessment & Plan  · In the setting of COVID infection, plan as per above  · C  diff culture negative  · Urine culture with mixed contaminant Plan:     Code Status: Level 1 - Full Code      Subjective:   Generalized weakness  POx initially on low side  S/p IV lasix improved    Objective:     Vitals:   Temp (24hrs), Av 5 °F (36 4 °C), Min:96 8 °F (36 °C), Max:98 °F (36 7 °C)    Temp:  [96 8 °F (36 °C)-98 °F (36 7 °C)] 96 8 °F (36 °C)  HR:  [56-70] 62  Resp:  [15-18] 15  BP: (122-152)/(63-85) 122/64  SpO2:  [86 %-98 %] 97 %  Body mass index is 35 39 kg/m²  Input and Output Summary (last 24 hours): Intake/Output Summary (Last 24 hours) at 2021  Last data filed at 2021 1900  Gross per 24 hour   Intake 520 07 ml   Output 1475 ml   Net -954 93 ml       Physical Exam:     Physical Exam  Cardiovascular:      Rate and Rhythm: Normal rate and regular rhythm  Pulses: Normal pulses  Heart sounds: Normal heart sounds  Pulmonary:      Effort: Pulmonary effort is normal  No respiratory distress  Breath sounds: No wheezing or rales  Comments: Diminished bs  Abdominal:      General: Abdomen is flat  Bowel sounds are normal  There is no distension  Palpations: Abdomen is soft  Tenderness: There is no abdominal tenderness  There is no guarding  Comments: +urostomy   Musculoskeletal:      Cervical back: Normal range of motion and neck supple  Right lower leg: No edema  Left lower leg: No edema  Comments: RUE edema, states its chronic as has AVF in place   Skin:     General: Skin is warm and dry  Neurological:      General: No focal deficit present  Mental Status: She is alert and oriented to person, place, and time  Mental status is at baseline  Cranial Nerves: No cranial nerve deficit  Motor: No weakness             Additional Data:     Labs:    Results from last 7 days   Lab Units 21  0425   WBC Thousand/uL 6 01   HEMOGLOBIN g/dL 7 2*   HEMATOCRIT % 22 4*   PLATELETS Thousands/uL 246     Results from last 7 days   Lab Units 21  0425 21  0530   SODIUM mmol/L 136 138   POTASSIUM mmol/L 4 4 3 5   CHLORIDE mmol/L 103 108   CO2 mmol/L 26 20*   BUN mg/dL 76* 42*   CREATININE mg/dL 3 58* 3 67*   ANION GAP mmol/L 7 10   CALCIUM mg/dL 7 7* 8 1*   ALBUMIN g/dL  --  2 3*   TOTAL BILIRUBIN mg/dL  --  0 56   ALK PHOS U/L  --  49   ALT U/L  --  9*   AST U/L  --  44   GLUCOSE RANDOM mg/dL 127 144*     Results from last 7 days   Lab Units 07/22/21  0937   INR  1 36*             Results from last 7 days   Lab Units 07/20/21  0530 07/19/21  0637   PROCALCITONIN ng/ml 0 17 0 11           * I Have Reviewed All Lab Data Listed Above  * Additional Pertinent Lab Tests Reviewed:  All Labs Within Last 24 Hours Reviewed    Imaging:    Imaging Reports Reviewed Today Include:   Imaging Personally Reviewed by Myself Includes:      Recent Cultures (last 7 days):           Last 24 Hours Medication List:   Current Facility-Administered Medications   Medication Dose Route Frequency Provider Last Rate    acetaminophen  650 mg Oral Q4H PRN Mitali Mejia, DO      albuterol  2 puff Inhalation Q4H PRN Shawanda Narvaez MD      atorvastatin  40 mg Oral Daily With Sen Failing, DO      calcitriol  0 25 mcg Oral Daily Mitali Mejia, DO      cholecalciferol  2,000 Units Oral Daily Mitali Mejia, DO      citalopram  20 mg Oral Daily Mitali Mejia, DO      dexamethasone  8 mg Intravenous Q12H [facility] Shae Zacarias MD      famotidine  20 mg Oral Daily Mitali Mejia, DO      heparin (porcine)  3-30 Units/kg/hr (Order-Specific) Intravenous Titrated Marli Spence, DO 5 Units/kg/hr (07/24/21 1430)    heparin (porcine)  3,200 Units Intravenous Q1H PRN Marlimike Spence, DO      heparin (porcine)  6,400 Units Intravenous Q1H PRN Marli Spence, DO      levothyroxine  75 mcg Oral Daily Mitali Mejia, DO      melatonin  3 mg Oral HS Mitali Mejia, DO      metoprolol tartrate  25 mg Oral BID Mitali Mejia, DO      multivitamin-minerals  1 tablet Oral Daily Mitali Mejia, DO      ondansetron  4 mg Intravenous Q4H PRN Gregory Monson DO      ruxolitinib  10 mg Oral Every Other Day Gregory Monson DO      saccharomyces boulardii  250 mg Oral BID VICTORINA Knox      senna-docusate sodium  2 tablet Oral HS Sanjiv Hoang DO      sodium bicarbonate  1,300 mg Oral BID after meals Stef Garcia,           Today, Patient Was Seen By: Sanjiv Hoang DO    ** Please Note: Dictation voice to text software may have been used in the creation of this document   **

## 2021-07-26 NOTE — ASSESSMENT & PLAN NOTE
Lab Results   Component Value Date    HGB 8 3 (L) 07/26/2021    HGB 7 2 (L) 07/25/2021    HGB 7 9 (L) 07/24/2021   · Chronic, no sign of acute blood loss  · Cont to monitor, transfuse if < 7

## 2021-07-26 NOTE — ASSESSMENT & PLAN NOTE
· In the setting of COVID-19 as above  · Plan as per above  · Now on mid flow  Remains on 15L pox much improved

## 2021-07-26 NOTE — ASSESSMENT & PLAN NOTE
· In the setting of COVID-19 as above  · Plan as per above  · Now on mid flow   Increased to 15L today s/p IV lasix improved pox

## 2021-07-26 NOTE — ASSESSMENT & PLAN NOTE
Lab Results   Component Value Date    EGFR 12 07/26/2021    EGFR 12 07/25/2021    EGFR 12 07/24/2021    CREATININE 3 63 (H) 07/26/2021    CREATININE 3 58 (H) 07/25/2021    CREATININE 3 63 (H) 07/24/2021   · on CKD stg V, secondary to ATN due to covid infection  · Secondary to sepCr baseline 3 4-3 8, follows with Dr Selena Gan  · Nephrology following   · Prn dosing of IV lasix per fluid status monitoring  · Has R AVF in place  · No indication for HD at this time  · On bicarb tablets, calcitrol

## 2021-07-26 NOTE — ASSESSMENT & PLAN NOTE
Lab Results   Component Value Date    HGB 7 2 (L) 07/25/2021    HGB 7 9 (L) 07/24/2021    HGB 8 4 (L) 07/22/2021   · Chronic, no sign of acute blood loss  · Cont to monitor, transfuse if < 7

## 2021-07-26 NOTE — PROGRESS NOTES
NEPHROLOGY PROGRESS NOTE   Nereyda Culp 77 y o  female MRN: [id number]  Unit/Bed#: -01 Encounter: [id number]      ASSESSMENT & PLAN:  1  Acute kidney injury on top of Chronic kidney disease stage 5  Baseline creatinine in the low to mid threes  Creatinine peak at 4 9 on 7/15, renal function improved and is lately close to the mid threes  No urgent indication for dialysis at this moment  Monitor ins and outs, follow daily labs, avoid nephrotoxins and avoiding hypotension    2  COVID-19 virus infection, management per Medicine team    3  Acute respiratory failure with hypoxemia in the setting of 2  Okay to use diuretics p r n  If hypoxemia worsening  Patient received Lasix yesterday due to increasing oxygen requirements    4  Hemodynamics, blood pressure stable, avoid relative hypotension    5  Anemia multifactorial in setting of CKD, noted patient with history of polycythemia vera, monitor H&H and transfusion as needed      SUBJECTIVE:  Patient seen and examined, feeling tired and weak, denies significant chest pain, complaining of some on and off shortness of breath, denies any nausea, no vomiting, no abdominal pain    Report constipation for several days  No acute issues reported by nurse    OBJECTIVE:  Current Weight: Weight - Scale: 89 8 kg (198 lb)  Vitals:    07/26/21 0615   BP: 140/81   Pulse: 72   Resp: 18   Temp: (!) 97 3 °F (36 3 °C)   SpO2: (!) 85%       Intake/Output Summary (Last 24 hours) at 7/26/2021 0802  Last data filed at 7/26/2021 0601  Gross per 24 hour   Intake 1006 ml   Output 2400 ml   Net -1394 ml     General: conscious, cooperative, in not acute distress  Eyes: conjunctivae pale, anicteric sclerae  ENT: lips and mucous membranes moist  Neck: supple, no JVD  Chest: clear breath sounds bilateral, no crackles, ronchus or wheezings  CVS: distinct S1 & S2, normal rate, regular rhythm  Abdomen: soft, non-tender, non-distended, normoactive bowel sounds, ileoconduit in place  Extremities:  Minimal edema of both legs, right upper extremity edema  Skin: no rash  Neuro: awake, alert, oriented, interactive        Medications:    Current Facility-Administered Medications:     acetaminophen (TYLENOL) tablet 650 mg, 650 mg, Oral, Q4H PRN, Tali Vences DO    albuterol (PROVENTIL HFA,VENTOLIN HFA) inhaler 2 puff, 2 puff, Inhalation, Q4H PRN, Elliot Mullins MD    atorvastatin (LIPITOR) tablet 40 mg, 40 mg, Oral, Daily With [place], , 40 mg at 07/25/21 1800    calcitriol (ROCALTROL) capsule 0 25 mcg, 0 25 mcg, Oral, Daily, Tali Vences DO, 0 25 mcg at 07/25/21 0809    cholecalciferol (VITAMIN D3) tablet 2,000 Units, 2,000 Units, Oral, Daily, Tali Vences DO, 2,000 Units at 07/25/21 0809    citalopram (CeleXA) tablet 20 mg, 20 mg, Oral, Daily, Tali Vences DO, 20 mg at 07/25/21 0809    dexamethasone (DECADRON) injection 8 mg, 8 mg, Intravenous, Q12H [address], Kay Webb MD, 8 mg at 07/25/21 2135    famotidine (PEPCID) tablet 20 mg, 20 mg, Oral, Daily, Tali Vences DO, 20 mg at 07/25/21 0809    heparin (porcine) 25,000 units in 0 45% NaCl 250 mL infusion (premix), 3-30 Units/kg/hr (Order-Specific), Intravenous, Titrated, Ana Lilia Graham DO, Last Rate: 4 mL/hr at 07/24/21 1430, 5 Units/kg/hr at 07/24/21 1430    heparin (porcine) injection 3,200 Units, 3,200 Units, Intravenous, Q1H PRN, Ana Lilia Graham DO, 3,200 Units at 07/24/21 1432    heparin (porcine) injection 6,400 Units, 6,400 Units, Intravenous, Q1H PRN, Ana Lilia Graham DO    levothyroxine tablet 75 mcg, 75 mcg, Oral, Daily, Tali Vences DO, 75 mcg at 07/26/21 0536    melatonin tablet 3 mg, 3 mg, Oral, HS, Tali Vences DO, 3 mg at 07/25/21 2135    metoprolol tartrate (LOPRESSOR) tablet 25 mg, 25 mg, Oral, BID, Tali Vences DO, 25 mg at 07/25/21 1800    [COMPLETED] zinc sulfate (ZINCATE) capsule 220 mg, 220 mg, Oral, Daily, 220 mg at 07/21/21 0859 **FOLLOWED BY** multivitamin-minerals (CENTRUM ADULTS) tablet 1 tablet, 1 tablet, Oral, Daily, [facility], DO, 1 tablet at 07/25/21 0808    ondansetron [facility]) injection 4 mg, 4 mg, Intravenous, Q4H PRN, [facility], DO, 4 mg at 07/17/21 0845    ruxolitinib (JAKAFI) tablet 10 mg, 10 mg, Oral, Every Other Day, [facility], DO, 10 mg at 07/24/21 1435    saccharomyces boulardii (FLORASTOR) capsule 250 mg, 250 mg, Oral, BID, Alysia Kauffman, CRNP, 250 mg at 07/25/21 1758    senna-docusate sodium (SENOKOT S) 8 6-50 mg per tablet 2 tablet, 2 tablet, Oral, HS, Apurva Zacarias, DO, 2 tablet at 07/25/21 2135    sodium bicarbonate tablet 1,300 mg, 1,300 mg, Oral, BID after meals, Ashley Swift, DO, 1,300 mg at 07/25/21 1758    Invasive Devices:        Lab Results:   Results from last 7 days   Lab Units 07/26/21  0602 07/25/21  0425 07/24/21  0521 07/20/21  0530   WBC Thousand/uL 8 30 6 01 7 27 2 73*   HEMOGLOBIN g/dL 8 3* 7 2* 7 9* 8 7*   HEMATOCRIT % 24 9* 22 4* 24 3* 26 5*   PLATELETS Thousands/uL 293 246 261 234   SODIUM mmol/L 136 136 138 138   POTASSIUM mmol/L 4 5 4 4 4 2 3 5   CHLORIDE mmol/L 100 103 104 108   CO2 mmol/L 26 26 24 20*   BUN mg/dL 81* 76* 73* 42*   CREATININE mg/dL 3 63* 3 58* 3 63* 3 67*   CALCIUM mg/dL 8 1* 7 7* 8 2* 8 1*   ALK PHOS U/L  --   --   --  49   ALT U/L  --   --   --  9*   AST U/L  --   --   --  44         Portions of the record may have been created with voice recognition software  Occasional wrong word or "sound a like" substitutions may have occurred due to the inherent limitations of voice recognition software  Read the chart carefully and recognize, using context, where substitutions have occurred  If you have any questions, please contact the dictating provider

## 2021-07-26 NOTE — ASSESSMENT & PLAN NOTE
· Patient presented with fever, generalized weakness, diarrhea decreasing oral intake and difficulty with ambulation for 1 week per admission record review  · COVID-19 pneumonia, unfortunately unvaccinated  · S/p completion of remdesivir x 5 days  · On increased dose of dexamethasone 8mg bid, on gi prophylaxis   · Per pulmonary considering possibility of acemtra  · Abx dcd given low procal x 2  · Worsening d-dimer thus recommended per pulmonary to d/c eliquis and start on heparin gtt  · Now on IV lasix 60mg every other day  · Cont to trend d-dimer  · Remains on stepdown - low threshold to involve critical care   · Respiratory protocol

## 2021-07-26 NOTE — OCCUPATIONAL THERAPY NOTE
Occupational Therapy Treatment Note      Jene Crigler    7/26/2021    Principal Problem:    COVID-19 virus infection  Active Problems:    Chronic saddle pulmonary embolism ([facility] )    Obstructive uropathy    Polycythemia vera (HCC)    Anemia in CKD (chronic kidney disease)    Acute renal failure superimposed on stage 5 chronic kidney disease, not on chronic dialysis ([facility] )    History of Clostridioides difficile colitis    Class 2 severe obesity due to excess calories with serious comorbidity and body mass index (BMI) of 35 0 to 35 9 in adult [facility])    Febrile illness    Acute respiratory failure with hypoxia ([facility] )      Past Medical History:   Diagnosis Date    Anxiety     Bowel obstruction (HCC)     Chronic kidney disease (CKD), stage IV (severe) (HCC)     stage IV    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    Circulation problem     [facility]     Hernia of abdominal cavity     History of kidney problems     Hydronephrosis     Hypertension     IBS (irritable bowel syndrome)     Incontinence     Lung mass     Improving on PET/CT 1/2016    Polycythemia vera ([facility] )     Pulmonary embolism ([facility] ) 2014    Shingles     Urinary tract infection        Past Surgical History:   Procedure Laterality Date    ABDOMINAL ADHESION SURGERY N/A 8/9/2020    Procedure: LYSIS ADHESIONS;  Surgeon: Mao Andino DO;  [facility];  Service: General    BLADDER SUSPENSION      BOTOX INJECTION N/A 7/27/2016    Procedure: BOTOX INJECTION ;  Surgeon: Juni Rivera MD;  [facility];  Service:     CHOLECYSTECTOMY N/A     COLONOSCOPY      CYSTECTOMY, RADICAL WITH ILEOCONDUIT N/A 10/4/2016    Procedure: SUPRATRIGONAL CYSTECTOMY WITH ILEAL CONDUIT ;  Surgeon: Juni Rivera MD;  [facility];  Service:    Glenn Duos W/ RETROGRADES Bilateral 7/27/2016    Procedure: Ozell Brush ;  Surgeon: Juni Rivera MD;  [facility];  Service:     HERNIA REPAIR      IR AV FISTULAGRAM/GRAFTOGRAM  2/10/2021    IR NEPHROSTOMY TO NEPHROURETERAL STENT  5/15/2021    IR NEPHROSTOMY TO NEPHROURETERAL STENT  6/9/2021    IR NEPHROSTOMY TUBE CHECK AND/OR REMOVAL  6/16/2021    IR NEPHROSTOMY TUBE CHECK/CHANGE/REPOSITION/REINSERTION/UPSIZE  5/14/2021    IR NEPHROSTOMY TUBE CHECK/CHANGE/REPOSITION/REINSERTION/UPSIZE  5/21/2021    IR NEPHROSTOMY TUBE PLACEMENT  5/10/2021    IR NON-TUNNELED CENTRAL LINE PLACEMENT  7/17/2020    IR NON-TUNNELED CENTRAL LINE PLACEMENT  8/14/2020    IR NON-TUNNELED CENTRAL LINE PLACEMENT  7/16/2021    LAPAROTOMY N/A 8/9/2020    Procedure: LAPAROTOMY EXPLORATORY, PARASTOMAL HERNIA REPAIR WITH MESH;  Surgeon: Mao Andino DO;  [facility];  Service: General    GA ANASTOMOSIS,AV,ANY SITE Right 1/5/2021    Procedure: Creation of right brachiobasilic fistula; Surgeon: Karson Chairez MD;  [facility];  Service: Vascular    GA COLONOSCOPY FLX DX W/COLLJ SPEC WHEN PFRMD N/A 8/31/2016    Procedure: COLONOSCOPY;  Surgeon: Bernadine Landin MD;  Location: [facility]; Service: Gastroenterology    GA CYSTOSCOPY,INSERT URETERAL STENT Bilateral 7/27/2016    Procedure: STENT INSERTION; EXCISION OF MESH ;  Surgeon: Juni Rivera MD;  [facility];  Service: Urology    TONSILLECTOMY      TUBAL LIGATION      WISDOM TOOTH EXTRACTION          07/26/21 1444   OT Last Visit   OT Visit Date 07/26/21   Note Type   Note Type Treatment   Restrictions/Precautions   Weight Bearing Precautions Per Order No   Other Precautions Contact/isolation; Airborne/isolation;Cognitive; Chair Alarm;Multiple lines;Telemetry;O2;Fall Risk;Pain  (14L Midflow; COVID (+))   Lifestyle   Autonomy I ADLS/IADLS, transfers and functional mobility PTA   Reciprocal Relationships Pt lives w/ her son who per EMR has autism and pt is primary caregiver; pt also has 2nd son who doesn't live w/ them   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; was a head    Intrinsic Gratification Pt reports no hobbies; per EMR enjoys reading, Religion and hanging w/ the neighbor friends   Pain Assessment   Pain Assessment Tool Pain Assessment not indicated - pt denies pain   Pain Score No Pain   Transfers   Sit to Stand 4  Minimal assistance   Additional items Assist x 1; Increased time required;Verbal cues   Stand to Sit 4  Minimal assistance   Additional items Assist x 1; Increased time required;Verbal cues   Additional Comments Pt performed STS from recliner w/ Min A x1, RW for support in standing  O2 remained in low 90's upon stand  Functional Mobility   Functional Mobility 4  Minimal assistance   Additional Comments Pt ambulated short household distance w/ Min A x1, RW for support  O2 sat dropped to 83% after short household distance  Pt took seated rest break in recliner, and O2 recovered to 91% after 1-2 min rest break  Pt reported feeling fatigued  Educated on deep breathing strategies  Additional items Rolling walker   Therapeutic Excerise-Strength   UE Strength Yes   Right Upper Extremity- Strength   R Shoulder Flexion   R Elbow Elbow flexion;Elbow extension   R Position Seated   R Weight/Reps/Sets 3 sets of 10   RUE Strength Comment Pt given UE exercises to perform 2-3x/wk while alone in room to increase strength for independence in functional tasks  Pt given bicep curls,chest press and shoulder flexion exercises  Pt demonstrated and verbalized understanding of recommended plan  Left Upper Extremity-Strength   L Shoulder Flexion   L Elbow Elbow flexion;Elbow extension   L Position Seated   L Weights/Reps/Sets 3 sets of 10   Cognition   Overall Cognitive Status Impaired   Arousal/Participation Responsive; Cooperative   Attention Attends with cues to redirect   Orientation Level Oriented X4   Memory Decreased recall of precautions   Following Commands Follows one step commands without difficulty   Comments Pt is pleasant and cooperative; slow to respond; reports she wants to go home  Limited by fatigue  Needs re-direction to task  Overall flat affect  Activity Tolerance   Activity Tolerance Patient limited by fatigue   Medical Staff Made Aware León MANN   Assessment   Assessment Patient participated in Skilled OT session 7/26/2021 with interventions consisting of Energy Conservation techniques, deep breathing technique, safety awareness and fall prevention techniques, therapeutic exercise to: increase functional use of BUEs, increase BUE muscle strength ,  therapeutic activities to: increase activity tolerance, increase standing tolerance time with unilateral UE support to complete sink level ADLs, increase cardiovascular endurance  and increase OOB/ sitting tolerance   Patient agreeable to OT treatment session, upon arrival patient was found seated OOB to Recliner  In comparison to previous session, patient with improvements in transfers, functional mobility, endurance, static/dynamic sitting/standing balance, and verbal engagement  Patient requiring verbal cues for safety, verbal cues for pacing thru activity steps and frequent rest periods  Patient continues to be functioning below baseline level, occupational performance remains limited secondary to factors listed above and increased risk for falls and injury  From OT standpoint, recommendation at time of d/c would be Short Term Rehab when medically stable  Patient to benefit from continued Occupational Therapy treatment while in the hospital to address deficits as defined above and maximize level of functional independence with ADLs and functional mobility  Plan   Treatment Interventions ADL retraining;UE strengthening/ROM; Functional transfer training; Endurance training;Cognitive reorientation;Patient/family training;Equipment evaluation/education; Compensatory technique education;Continued evaluation; Energy conservation; Activityengagement   Goal Expiration Date 07/30/21   OT Treatment Day 1   OT Frequency 3-5x/wk   Recommendation   OT Discharge Recommendation Post acute rehabilitation services   OT - OK to Discharge Yes  (when medically stable)   Additional Comments  The patient's raw score on the AM-PAC Daily Activity inpatient short form is 15, standardized score is 34 69, less than 39 4  Patients at this level are likely to benefit from discharge to post-acute rehabilitation services  Please refer to the recommendation of the Occupational Therapist for safe discharge planning     AM-PAC Daily Activity Inpatient   Lower Body Dressing 2   Bathing 2   Toileting 2   Upper Body Dressing 2   Grooming 3   Eating 4   Daily Activity Raw Score 15   Daily Activity Standardized Score (Calc for Raw Score >=11) 34 69   AM-[facility] Applied Cognition Inpatient   Following a Speech/Presentation 2   Understanding Ordinary Conversation 4   Taking Medications 3   Remembering Where Things Are Placed or Put Away 3   Remembering List of 4-5 Errands 2   Taking Care of Complicated Tasks 2   Applied Cognition Raw Score 16   Applied Cognition Standardized Score 35 03       ADDITIONAL OT GOALS:    - Pt will improve functional transfers to Mod I on/off all surfaces using DME as needed w/ G balance/safety     - Pt will improve functional mobility during ADL/IADL/leisure tasks to Mod I using DME as needed w/ G balance/safety     Karlee Bryant MS, OTR/L

## 2021-07-26 NOTE — ASSESSMENT & PLAN NOTE
· Patient presented with fever, generalized weakness, diarrhea decreasing oral intake and difficulty with ambulation for 1 week per admission record review  · COVID-19 pneumonia, unfortunately unvaccinated  · S/p completion of remdesivir x 5 days  · On increased dose of dexamethasone 8mg bid, on gi prophylaxis   · Abx dcd given low procal x 2  · Worsening d-dimer thus recommended per pulmonary to d/c eliquis and start on heparin gtt  · Now on IV lasix 60mg every other day  · Cont to trend d-dimer  · Remains on stepdown - low threshold to involve critical care   · Respiratory protocol

## 2021-07-26 NOTE — PROGRESS NOTES
[address] [facility]  Progress Note - Rory Boeck [birth year], 77 y o  female MRN: [id number]  Unit/Bed#: -01 Encounter: [id number]  Primary Care Provider: Karl Hyman MD   Date and time admitted to hospital: 7/15/2021 10:52 AM    * COVID-19 virus infection  Assessment & Plan  · Patient presented with fever, generalized weakness, diarrhea decreasing oral intake and difficulty with ambulation for 1 week per admission record review  · COVID-19 pneumonia, unfortunately unvaccinated  · S/p completion of remdesivir x 5 days  · On increased dose of dexamethasone 8mg bid, on gi prophylaxis   · Abx dcd given low procal x 2  · Worsening d-dimer thus recommended per pulmonary to d/c eliquis and start on heparin gtt  · Now on IV lasix 60mg every other day  · Cont to trend d-dimer  · Remains on stepdown - low threshold to involve critical care   · Respiratory protocol         Acute respiratory failure with hypoxia ([address] )  Assessment & Plan  · In the setting of COVID-19 as above  · Plan as per above  · Now on mid flow  Remains on 15L pox much improved    Acute renal failure superimposed on stage 5 chronic kidney disease, not on chronic dialysis [facility])  Assessment & Plan  Lab Results   Component Value Date    EGFR 12 07/26/2021    EGFR 12 07/25/2021    EGFR 12 07/24/2021    CREATININE 3 63 (H) 07/26/2021    CREATININE 3 58 (H) 07/25/2021    CREATININE 3 63 (H) 07/24/2021   · on CKD stg V, secondary to ATN due to covid infection  · Secondary to sepCr baseline 3 4-3 8, follows with Dr Lauro Magana  · Nephrology following   · Prn dosing of IV lasix per fluid status monitoring  · Has R AVF in place  · No indication for HD at this time  · On bicarb tablets, calcitrol      Edema of right upper extremity  Assessment & Plan  Chronic as + AVF in place but more increased than usual as also confirmed per nephrology  Check upper extremity doppler  Already on heparin gtt    Class 2 severe obesity due to excess calories with serious comorbidity and body mass index (BMI) of 35 0 to 35 9 in adult [facility])  Assessment & Plan  Body mass index is 38 67 kg/m²  · Nutrition consult once improved from above    History of Clostridioides difficile colitis  Assessment & Plan  · Diarrhea likely secondary to covid 19 infection; resolved  · Noted history of C  Diff  · C  Diff testing was negative on admission, however patient did received IV abx  Received PO Vanco for prophylaxis for 3 days post last dose of IV abx    Anemia in CKD (chronic kidney disease)  Assessment & Plan  Lab Results   Component Value Date    HGB 8 3 (L) 07/26/2021    HGB 7 2 (L) 07/25/2021    HGB 7 9 (L) 07/24/2021   · Chronic, no sign of acute blood loss  · Cont to monitor, transfuse if < 7     Polycythemia vera ([facility] [address] )  Assessment & Plan  · Patient follows up with outpatient Hematology Oncology, currently on Jakafi     Obstructive uropathy  Assessment & Plan  · S/p ileostomy for nonfunctioning bladder and chronic hydro  · OP urology follow up    Chronic saddle pulmonary embolism ([facility] [address] )  Assessment & Plan  · On heparin gtt; switched due to elevated d-dimer as recommended per pulmonary        VTE Pharmacologic Prophylaxis:   Pharmacologic: Heparin Drip  Mechanical VTE Prophylaxis in Place: No    Patient Centered Rounds: I have performed bedside rounds with nursing staff today  Discussions with Specialists or Other Care Team Provider: Pulmonary    Education and Discussions with Family / Patient: Patient; called son Vitor Cochran no answer so called dtr Em Old and updated at [facility]    Time Spent for Care: 30 minutes  More than 50% of total time spent on counseling and coordination of care as described above      Current Length of Stay: 11 day(s)    Current Patient Status: Inpatient   Certification Statement: The patient will continue to require additional inpatient hospital stay due to COVID PNA, Acute hypoxic resp failure    Discharge Plan:     Code Status: Level 1 - Full Code      Subjective:   Remains on 15L mid flow, pox improved compared to yesterday  Wishes she was getting better faster  Admits though dyspnea improved from yesterday    Objective:     Vitals:   Temp (24hrs), Av 7 °F (36 5 °C), Min:97 3 °F (36 3 °C), Max:98 6 °F (37 °C)    Temp:  [97 3 °F (36 3 °C)-98 6 °F (37 °C)] 97 5 °F (36 4 °C)  HR:  [56-72] 68  Resp:  [18] 18  BP: (125-167)/(65-85) 125/65  SpO2:  [85 %-98 %] 97 %  Body mass index is 38 67 kg/m²  Input and Output Summary (last 24 hours): Intake/Output Summary (Last 24 hours) at 2021  Last data filed at 2021 1716  Gross per 24 hour   Intake 584 47 ml   Output 1700 ml   Net -1115 53 ml       Physical Exam:     Physical Exam  Constitutional:       Appearance: She is obese  Cardiovascular:      Rate and Rhythm: Normal rate and regular rhythm  Pulses: Normal pulses  Heart sounds: Normal heart sounds  Pulmonary:      Effort: Pulmonary effort is normal  No respiratory distress  Breath sounds: No wheezing or rales  Comments: Diminished bs  Abdominal:      General: Abdomen is flat  Bowel sounds are normal  There is no distension  Palpations: Abdomen is soft  Tenderness: There is no abdominal tenderness  There is no guarding  Musculoskeletal:         General: Normal range of motion  Cervical back: Normal range of motion and neck supple  Right lower leg: No edema  Left lower leg: No edema  Comments: RUE edema   Skin:     General: Skin is warm and dry  Neurological:      General: No focal deficit present  Mental Status: She is alert and oriented to person, place, and time  Mental status is at baseline  Cranial Nerves: No cranial nerve deficit           Additional Data:     Labs:    Results from last 7 days   Lab Units 21  0602   WBC Thousand/uL 8 30   HEMOGLOBIN g/dL 8 3*   HEMATOCRIT % 24 9*   PLATELETS Thousands/uL 293     Results from last 7 days   Lab Units 07/26/21  0602 07/20/21  0530   SODIUM mmol/L 136 138   POTASSIUM mmol/L 4 5 3 5   CHLORIDE mmol/L 100 108   CO2 mmol/L 26 20*   BUN mg/dL 81* 42*   CREATININE mg/dL 3 63* 3 67*   ANION GAP mmol/L 10 10   CALCIUM mg/dL 8 1* 8 1*   ALBUMIN g/dL  --  2 3*   TOTAL BILIRUBIN mg/dL  --  0 56   ALK PHOS U/L  --  49   ALT U/L  --  9*   AST U/L  --  44   GLUCOSE RANDOM mg/dL 132 144*     Results from last 7 days   Lab Units 07/22/21  0937   INR  1 36*             Results from last 7 days   Lab Units 07/20/21  0530   PROCALCITONIN ng/ml 0 17           * I Have Reviewed All Lab Data Listed Above  * Additional Pertinent Lab Tests Reviewed:  All Labs Within Last 24 Hours Reviewed    Imaging:    Imaging Reports Reviewed Today Include:   Imaging Personally Reviewed by Myself Includes:      Recent Cultures (last 7 days):           Last 24 Hours Medication List:   Current Facility-Administered Medications   Medication Dose Route Frequency Provider Last Rate    acetaminophen  650 mg Oral Q4H PRN [facility], DO      albuterol  2 puff Inhalation Q4H PRN Julio Calhoun MD      atorvastatin  40 mg Oral Daily With [employer], DO      calcitriol  0 25 mcg Oral Daily [facility], DO      cholecalciferol  2,000 Units Oral Daily [facility], DO      citalopram  20 mg Oral Daily [facility], DO      dexamethasone  8 mg Intravenous Q12H [address] Sharona Villalta MD      famotidine  20 mg Oral Daily [facility], DO      heparin (porcine)  3-30 Units/kg/hr (Order-Specific) Intravenous Titrated Marlinda Doles, DO 5 Units/kg/hr (07/26/21 1805)    heparin (porcine)  3,200 Units Intravenous Q1H PRN Marlinda Doles, DO      heparin (porcine)  6,400 Units Intravenous Q1H PRN Marlinda Doles, DO      levothyroxine  75 mcg Oral Daily [facility], DO      melatonin  3 mg Oral HS [facility], DO      metoprolol tartrate  25 mg Oral BID [facility], DO      multivitamin-minerals  1 tablet Oral Daily [facility], DO      ondansetron  4 mg Intravenous Q4H PRN Ashely Kong DO      ruxolitinib  10 mg Oral Every Other Day Ashely Kong DO      saccharomyces boulardii  250 mg Oral BID VICTORINA Lim      senna-docusate sodium  2 tablet Oral HS Chacha Pires DO      sodium bicarbonate  1,300 mg Oral BID after meals Aashish Omer DO          Today, Patient Was Seen By: Chacha Pires DO    ** Please Note: Dictation voice to text software may have been used in the creation of this document   **

## 2021-07-27 ENCOUNTER — APPOINTMENT (INPATIENT)
Dept: NON INVASIVE DIAGNOSTICS | Facility: HOSPITAL | Age: 76
DRG: 177 | End: 2021-07-27
Payer: COMMERCIAL

## 2021-07-27 LAB
ANION GAP SERPL CALCULATED.3IONS-SCNC: 10 MMOL/L (ref 4–13)
APTT PPP: 125 SECONDS (ref 23–37)
APTT PPP: 43 SECONDS (ref 23–37)
APTT PPP: 44 SECONDS (ref 23–37)
BUN SERPL-MCNC: 90 MG/DL (ref 5–25)
CALCIUM SERPL-MCNC: 7.9 MG/DL (ref 8.3–10.1)
CHLORIDE SERPL-SCNC: 102 MMOL/L (ref 100–108)
CO2 SERPL-SCNC: 26 MMOL/L (ref 21–32)
CREAT SERPL-MCNC: 3.42 MG/DL (ref 0.6–1.3)
D DIMER PPP FEU-MCNC: 1.45 UG/ML FEU
ERYTHROCYTE [DISTWIDTH] IN BLOOD BY AUTOMATED COUNT: 15.6 % (ref 11.6–15.1)
GFR SERPL CREATININE-BSD FRML MDRD: 12 ML/MIN/1.73SQ M
GLUCOSE SERPL-MCNC: 129 MG/DL (ref 65–140)
HCT VFR BLD AUTO: 26.7 % (ref 34.8–46.1)
HGB BLD-MCNC: 8.7 G/DL (ref 11.5–15.4)
MCH RBC QN AUTO: 29.7 PG (ref 26.8–34.3)
MCHC RBC AUTO-ENTMCNC: 32.6 G/DL (ref 31.4–37.4)
MCV RBC AUTO: 91 FL (ref 82–98)
PLATELET # BLD AUTO: 325 THOUSANDS/UL (ref 149–390)
PMV BLD AUTO: 10.4 FL (ref 8.9–12.7)
POTASSIUM SERPL-SCNC: 5 MMOL/L (ref 3.5–5.3)
RBC # BLD AUTO: 2.93 MILLION/UL (ref 3.81–5.12)
SODIUM SERPL-SCNC: 138 MMOL/L (ref 136–145)
WBC # BLD AUTO: 9.73 THOUSAND/UL (ref 4.31–10.16)

## 2021-07-27 PROCEDURE — 85379 FIBRIN DEGRADATION QUANT: CPT | Performed by: INTERNAL MEDICINE

## 2021-07-27 PROCEDURE — 85730 THROMBOPLASTIN TIME PARTIAL: CPT | Performed by: INTERNAL MEDICINE

## 2021-07-27 PROCEDURE — 93971 EXTREMITY STUDY: CPT

## 2021-07-27 PROCEDURE — 99233 SBSQ HOSP IP/OBS HIGH 50: CPT | Performed by: INTERNAL MEDICINE

## 2021-07-27 PROCEDURE — 93971 EXTREMITY STUDY: CPT | Performed by: SURGERY

## 2021-07-27 PROCEDURE — 85027 COMPLETE CBC AUTOMATED: CPT | Performed by: INTERNAL MEDICINE

## 2021-07-27 PROCEDURE — 80048 BASIC METABOLIC PNL TOTAL CA: CPT | Performed by: INTERNAL MEDICINE

## 2021-07-27 PROCEDURE — 85730 THROMBOPLASTIN TIME PARTIAL: CPT | Performed by: PHYSICIAN ASSISTANT

## 2021-07-27 PROCEDURE — 99232 SBSQ HOSP IP/OBS MODERATE 35: CPT | Performed by: INTERNAL MEDICINE

## 2021-07-27 PROCEDURE — 94760 N-INVAS EAR/PLS OXIMETRY 1: CPT

## 2021-07-27 RX ORDER — LANOLIN ALCOHOL/MO/W.PET/CERES
6 CREAM (GRAM) TOPICAL
Status: DISCONTINUED | OUTPATIENT
Start: 2021-07-27 | End: 2021-08-03 | Stop reason: HOSPADM

## 2021-07-27 RX ORDER — BISACODYL 10 MG
10 SUPPOSITORY, RECTAL RECTAL DAILY
Status: DISPENSED | OUTPATIENT
Start: 2021-07-28 | End: 2021-07-31

## 2021-07-27 RX ORDER — POLYETHYLENE GLYCOL 3350 17 G/17G
17 POWDER, FOR SOLUTION ORAL DAILY
Status: DISCONTINUED | OUTPATIENT
Start: 2021-07-28 | End: 2021-08-03 | Stop reason: HOSPADM

## 2021-07-27 RX ADMIN — ATORVASTATIN CALCIUM 40 MG: 40 TABLET, FILM COATED ORAL at 18:16

## 2021-07-27 RX ADMIN — SODIUM BICARBONATE 650 MG TABLET 1300 MG: at 09:08

## 2021-07-27 RX ADMIN — FAMOTIDINE 20 MG: 20 TABLET ORAL at 09:08

## 2021-07-27 RX ADMIN — METOPROLOL TARTRATE 25 MG: 25 TABLET, FILM COATED ORAL at 18:16

## 2021-07-27 RX ADMIN — HEPARIN SODIUM 3200 UNITS: 1000 INJECTION INTRAVENOUS; SUBCUTANEOUS at 03:11

## 2021-07-27 RX ADMIN — HEPARIN SODIUM 3200 UNITS: 1000 INJECTION INTRAVENOUS; SUBCUTANEOUS at 19:09

## 2021-07-27 RX ADMIN — METOPROLOL TARTRATE 25 MG: 25 TABLET, FILM COATED ORAL at 09:08

## 2021-07-27 RX ADMIN — SENNOSIDES AND DOCUSATE SODIUM 2 TABLET: 8.6; 5 TABLET ORAL at 21:19

## 2021-07-27 RX ADMIN — CALCITRIOL CAPSULES 0.25 MCG 0.25 MCG: 0.25 CAPSULE ORAL at 09:09

## 2021-07-27 RX ADMIN — Medication 2000 UNITS: at 09:08

## 2021-07-27 RX ADMIN — Medication 250 MG: at 09:09

## 2021-07-27 RX ADMIN — LEVOTHYROXINE SODIUM 75 MCG: 75 TABLET ORAL at 06:01

## 2021-07-27 RX ADMIN — Medication 250 MG: at 18:16

## 2021-07-27 RX ADMIN — DEXAMETHASONE SODIUM PHOSPHATE 8 MG: 4 INJECTION INTRA-ARTICULAR; INTRALESIONAL; INTRAMUSCULAR; INTRAVENOUS; SOFT TISSUE at 21:19

## 2021-07-27 RX ADMIN — MULTIPLE VITAMINS W/ MINERALS TAB 1 TABLET: TAB ORAL at 09:08

## 2021-07-27 RX ADMIN — DEXAMETHASONE SODIUM PHOSPHATE 8 MG: 4 INJECTION INTRA-ARTICULAR; INTRALESIONAL; INTRAMUSCULAR; INTRAVENOUS; SOFT TISSUE at 09:09

## 2021-07-27 RX ADMIN — CITALOPRAM HYDROBROMIDE 20 MG: 20 TABLET ORAL at 09:08

## 2021-07-27 RX ADMIN — MELATONIN TAB 3 MG 6 MG: 3 TAB at 21:19

## 2021-07-27 NOTE — PROGRESS NOTES
NEPHROLOGY PROGRESS NOTE   Jacquelin Gaitan 77 y o  female MRN: [id number]  Unit/Bed#: MICU 14 Encounter: [id number]      ASSESSMENT & PLAN:  1  Acute kidney injury on top of Chronic kidney disease stage 5  Baseline creatinine in the low to mid threes  Creatinine peak at 4 9 on 7/15, renal function improved and is currently at baseline in the mid threes  No urgent indication for dialysis at this moment  Monitor ins and outs, follow daily labs, avoid nephrotoxins and avoiding hypotension    2  COVID-19 virus infection, management per Medicine team    3  Acute respiratory failure with hypoxemia in the setting of #2  Okay to use diuretics p r n  If hypoxemia worsening  Patient received Lasix on 07/26 due to increasing oxygen requirements    4  Hemodynamics, blood pressure stable, avoid relative hypotension    5  Anemia multifactorial in setting of CKD, noted patient with history of polycythemia vera, monitor H&H and transfusion as needed    6  Right upper extremity AV fistula in place, right upper extremity edema, plan for Doppler, if any acute issues please consult vascular surgery    7  Constipation, needs better bowel regimen    My plan and recommendations were discussed with Internal Medicine attending    SUBJECTIVE:  Patient seen and examined was transferred to the MICU due to staffing issues  Feeling about the same, denies significant chest pain or shortness of breath, feeling tired  Patient complaining of persistent constipation    Denies any abdominal pain, no nausea, no vomiting    OBJECTIVE:  Current Weight: Weight - Scale: 89 8 kg (198 lb)  Vitals:    07/27/21 0600   BP: 146/73   Pulse: (!) 52   Resp:    Temp: 97 8 °F (36 6 °C)   SpO2: 100%       Intake/Output Summary (Last 24 hours) at 7/27/2021 3372  Last data filed at 7/26/2021 2101  Gross per 24 hour   Intake 70 71 ml   Output 300 ml   Net -229 29 ml     General: conscious, cooperative, in not acute distress  Eyes: conjunctivae pink, anicteric sclerae  ENT: lips and mucous membranes moist, oxygen nasal cannula  Neck: supple, no JVD  Chest:  Coarse breath sounds bilateral, no crackles, ronchus or wheezings  CVS: distinct S1 & S2, normal rate, regular rhythm  Abdomen: soft, non-tender, non-distended, normoactive bowel sounds, ileal conduit in place  Extremities:  Minimal edema of both legs, right upper extremity with edema, AV fistula in place with positive thrill and bruit  Skin: no rash  Neuro: awake, alert, interactive          Medications:    Current Facility-Administered Medications:     acetaminophen (TYLENOL) tablet 650 mg, 650 mg, Oral, Q4H PRN, Neftali Das PA-C    albuterol (PROVENTIL HFA,VENTOLIN HFA) inhaler 2 puff, 2 puff, Inhalation, Q4H PRN, Neftali Das PA-C    atorvastatin (LIPITOR) tablet 40 mg, 40 mg, Oral, Daily With [employer], Neftali Das PA-C, 40 mg at 07/26/21 1715    calcitriol (ROCALTROL) capsule 0 25 mcg, 0 25 mcg, Oral, Daily, Neftali Das PA-C, 0 25 mcg at 07/26/21 1070    cholecalciferol (VITAMIN D3) tablet 2,000 Units, 2,000 Units, Oral, Daily, Neftali Das PA-C, 2,000 Units at 07/26/21 0850    citalopram (CeleXA) tablet 20 mg, 20 mg, Oral, Daily, Neftali Das PA-C, 20 mg at 07/26/21 5566    dexamethasone (DECADRON) injection 8 mg, 8 mg, Intravenous, Q12H [facility], Neftali Das PA-C, 8 mg at 07/26/21 2123    famotidine (PEPCID) tablet 20 mg, 20 mg, Oral, Daily, Eliane Funes PA-C, 20 mg at 07/26/21 0850    heparin (porcine) 25,000 units in 0 45% NaCl 250 mL infusion (premix), 3-30 Units/kg/hr (Order-Specific), Intravenous, Titrated, Amairani Grams, DO, Last Rate: 5 6 mL/hr at 07/27/21 0306, 7 Units/kg/hr at 07/27/21 0306    heparin (porcine) injection 3,200 Units, 3,200 Units, Intravenous, Q1H PRN, Neftali Das PA-C, 3,200 Units at 07/27/21 0311    heparin (porcine) injection 6,400 Units, 6,400 Units, Intravenous, Q1H PRN, Neftali Das PA-C    levothyroxine tablet 75 mcg, 75 mcg, Oral, Daily, Neftali Thiago, PA-C, 75 mcg at 07/27/21 0601    melatonin tablet 3 mg, 3 mg, Oral, HS, Neftali Thiago, PA-C, 3 mg at 07/26/21 2123    metoprolol tartrate (LOPRESSOR) tablet 25 mg, 25 mg, Oral, BID, Neftali Pier, PA-C, 25 mg at 07/26/21 1715    [COMPLETED] zinc sulfate (ZINCATE) capsule 220 mg, 220 mg, Oral, Daily, 220 mg at 07/21/21 0859 **FOLLOWED BY** multivitamin-minerals (CENTRUM ADULTS) tablet 1 tablet, 1 tablet, Oral, Daily, Neftali Thiago, PA-C, 1 tablet at 07/26/21 0850    ondansetron (ZOFRAN) injection 4 mg, 4 mg, Intravenous, Q4H PRN, Neftali Thiago, PA-C, 4 mg at 07/17/21 0845    ruxolitinib (JAKAFI) tablet 10 mg, 10 mg, Oral, Every Other Day, Neftali Thiago, PA-C, 10 mg at 07/26/21 3550    saccharomyces boulardii (FLORASTOR) capsule 250 mg, 250 mg, Oral, BID, Neftali Thiago, PA-C, 250 mg at 07/26/21 1719    senna-docusate sodium (SENOKOT S) 8 6-50 mg per tablet 2 tablet, 2 tablet, Oral, HS, Neftali Thiago, PA-C, 2 tablet at 07/26/21 2123    sodium bicarbonate tablet 1,300 mg, 1,300 mg, Oral, BID after meals, Neftali Thiago, PA-C, 1,300 mg at 07/26/21 1718    Invasive Devices:        Lab Results:   Results from last 7 days   Lab Units 07/27/21  0601 07/26/21  0602 07/25/21  0425   WBC Thousand/uL 9 73 8 30 6 01   HEMOGLOBIN g/dL 8 7* 8 3* 7 2*   HEMATOCRIT % 26 7* 24 9* 22 4*   PLATELETS Thousands/uL 325 293 246   SODIUM mmol/L 138 136 136   POTASSIUM mmol/L 5 0 4 5 4 4   CHLORIDE mmol/L 102 100 103   CO2 mmol/L 26 26 26   BUN mg/dL 90* 81* 76*   CREATININE mg/dL 3 42* 3 63* 3 58*   CALCIUM mg/dL 7 9* 8 1* 7 7*         Portions of the record may have been created with voice recognition software  Occasional wrong word or "sound a like" substitutions may have occurred due to the inherent limitations of voice recognition software  Read the chart carefully and recognize, using context, where substitutions have occurred  If you have any questions, please contact the dictating provider

## 2021-07-27 NOTE — PROGRESS NOTES
[facility]  Progress Note - Jorge Alberto Ser [birth year], 77 y o  female MRN: [id number]  Unit/Bed#: MICU 14 Encounter: [id number]  Primary Care Provider: Cullen Benson MD   Date and time admitted to hospital: 7/15/2021 10:52 AM    Edema of right upper extremity  Assessment & Plan  Chronic as + AVF in place but more increased than usual as also confirmed per nephrology  Check upper extremity doppler  Already on heparin gtt    Constipation  Assessment & Plan  Patient is having constipation, due probably to immobility and medications  Continue with senna and colace, miralax and dulcolax suppository was ordered  Monitor bm     Class 2 severe obesity due to excess calories with serious comorbidity and body mass index (BMI) of 35 0 to 35 9 in adult [facility])  Assessment & Plan  Body mass index is 38 67 kg/m²  · Nutrition consult once improved from above    History of Clostridioides difficile colitis  Assessment & Plan  · Diarrhea likely secondary to covid 19 infection; resolved  · Noted history of C  Diff  · C  Diff testing was negative on admission, however patient did received IV abx   Received PO Vanco for prophylaxis for 3 days post last dose of IV abx    Acute renal failure superimposed on stage 5 chronic kidney disease, not on chronic dialysis [facility])  Assessment & Plan  Lab Results   Component Value Date    EGFR 12 07/27/2021    EGFR 12 07/26/2021    EGFR 12 07/25/2021    CREATININE 3 42 (H) 07/27/2021    CREATININE 3 63 (H) 07/26/2021    CREATININE 3 58 (H) 07/25/2021   · on CKD stg V, secondary to ATN due to covid infection  · Secondary to sepCr baseline 3 4-3 8, follows with Dr Jenna Saunders  · Nephrology following   · Prn dosing of IV lasix per fluid status monitoring  · Has R AVF in place: dopplers, no issue edema noted  · No indication for HD at this time  · On bicarb tablets, calcitrol      Anemia in CKD (chronic kidney disease)  Assessment & Plan  Lab Results   Component Value Date    HGB 8 7 (L) 07/27/2021    HGB 8 3 (L) 07/26/2021    HGB 7 2 (L) 07/25/2021   · Chronic, no sign of acute blood loss  · Cont to monitor, transfuse if < 7     Polycythemia vera ([facility] [address] )  Assessment & Plan  · Patient follows up with outpatient Hematology Oncology, currently on Jakafi     Obstructive uropathy  Assessment & Plan  · S/p ileostomy for nonfunctioning bladder and chronic hydro  · OP urology follow up    Chronic saddle pulmonary embolism ([facility] [address] )  Assessment & Plan  · On heparin gtt; switched due to elevated d-dimer as recommended per pulmonary      * COVID-19 virus infection  Assessment & Plan  · Patient presented with fever, generalized weakness, diarrhea decreasing oral intake and difficulty with ambulation for 1 week per admission record review  · COVID-19 pneumonia, unfortunately unvaccinated  · S/p completion of remdesivir x 5 days  · On increased dose of dexamethasone 8mg bid, on gi prophylaxis   · Abx dcd given low procal x 2  · Worsening d-dimer thus recommended per pulmonary to d/c eliquis and start on heparin gtt: D dimer is improving, trending still going down tomorrow, will place her back to eliquis  · Still on 15 liters mid flow   · Now on IV lasix 60mg every other day  · Cont to trend d-dimer  · Remains on stepdown - low threshold to involve critical care   · Respiratory protocol              VTE Pharmacologic Prophylaxis:   Pharmacologic: Heparin Drip  Mechanical VTE Prophylaxis in Place: Yes    Patient Centered Rounds: I have performed bedside rounds with nursing staff today  Discussions with Specialists or Other Care Team Provider:      Education and Discussions with Family / Patient: patient and daughter updated     Time Spent for Care: 45 minutes  More than 50% of total time spent on counseling and coordination of care as described above      Current Length of Stay: 12 day(s)    Current Patient Status: Inpatient   Certification Statement: The patient will continue to require additional inpatient hospital stay due to covid infection, on 15 liters midflow, diuresis and renal function improving    Discharge Plan: patient is improving  Code Status: Level 1 - Full Code      Subjective:   Patient is sad and says she is cold today  She is constipated and concerned about that  No other questions per her  Objective:     Vitals:   Temp (24hrs), Av 4 °F (36 3 °C), Min:97 1 °F (36 2 °C), Max:97 8 °F (36 6 °C)    Temp:  [97 1 °F (36 2 °C)-97 8 °F (36 6 °C)] 97 8 °F (36 6 °C)  HR:  [52-74] 52  Resp:  [20] 20  BP: (123-146)/(60-73) 146/73  SpO2:  [95 %-100 %] 100 %  Body mass index is 38 67 kg/m²  Input and Output Summary (last 24 hours): Intake/Output Summary (Last 24 hours) at 2021 1836  Last data filed at 2021 2101  Gross per 24 hour   Intake 12 24 ml   Output 300 ml   Net -287 76 ml       Physical Exam:     Physical Exam  Vitals and nursing note reviewed  Constitutional:       Appearance: Normal appearance  HENT:      Head: Normocephalic and atraumatic  Mouth/Throat:      Mouth: Mucous membranes are moist    Eyes:      General: No scleral icterus  Right eye: No discharge  Left eye: No discharge  Extraocular Movements: Extraocular movements intact  Conjunctiva/sclera: Conjunctivae normal       Pupils: Pupils are equal, round, and reactive to light  Neck:      Vascular: No carotid bruit  Cardiovascular:      Rate and Rhythm: Normal rate  Pulmonary:      Effort: Respiratory distress present  Breath sounds: Normal breath sounds  Abdominal:      General: Abdomen is flat  Bowel sounds are normal  There is no distension  Palpations: Abdomen is soft  There is no mass  Tenderness: There is no abdominal tenderness  There is no guarding or rebound  Hernia: No hernia is present  Musculoskeletal:         General: Swelling present  No tenderness or deformity  Normal range of motion        Cervical back: Normal range of motion  No rigidity or tenderness  Lymphadenopathy:      Cervical: No cervical adenopathy  Skin:     General: Skin is warm and dry  Coloration: Skin is not jaundiced  Findings: No bruising  Neurological:      General: No focal deficit present  Mental Status: She is alert and oriented to person, place, and time  Psychiatric:         Mood and Affect: Mood normal          Behavior: Behavior normal          Additional Data:     Labs:    Results from last 7 days   Lab Units 07/27/21  0601   WBC Thousand/uL 9 73   HEMOGLOBIN g/dL 8 7*   HEMATOCRIT % 26 7*   PLATELETS Thousands/uL 325     Results from last 7 days   Lab Units 07/27/21  0601   POTASSIUM mmol/L 5 0   CHLORIDE mmol/L 102   CO2 mmol/L 26   BUN mg/dL 90*   CREATININE mg/dL 3 42*   CALCIUM mg/dL 7 9*     Results from last 7 days   Lab Units 07/22/21  0937   INR  1 36*       * I Have Reviewed All Lab Data Listed Above  * Additional Pertinent Lab Tests Reviewed:  [address] Admission Reviewed    Imaging:    Imaging Reports Reviewed Today Include:    Imaging Personally Reviewed by Myself Includes:  *   Recent Cultures (last 7 days):           Last 24 Hours Medication List:   Current Facility-Administered Medications   Medication Dose Route Frequency Provider Last Rate    acetaminophen  650 mg Oral Q4H PRN Karena Sharifa, PA-C      albuterol  2 puff Inhalation Q4H PRN Karena Skaggs, PA-C      atorvastatin  40 mg Oral Daily With [employer]      [START ON 7/28/2021] bisacodyl  10 mg Rectal Daily Maggy Sánchez MD      calcitriol  0 25 mcg Oral Daily Rosaantoinette Sharifa, PA-C      cholecalciferol  2,000 Units Oral Daily Karena Sharifa, PA-C      citalopram  20 mg Oral Daily Rosaantoinette Sharifa, PA-C      dexamethasone  8 mg Intravenous Q12H [address] Rosaland Sharifa, PA-C      famotidine  20 mg Oral Daily Rosaantoinette Sharifa, PA-C      heparin (porcine)  3-30 Units/kg/hr (Order-Specific) Intravenous Titrated Joon Kellogg DO 4 Units/kg/hr (07/27/21 1230)    heparin (porcine)  3,200 Units Intravenous Q1H PRN Dalila Collins PA-C      heparin (porcine)  6,400 Units Intravenous Q1H PRN Dalila Collins PA-C      levothyroxine  75 mcg Oral Daily Dalila Collins PA-C      melatonin  6 mg Oral HS Toña Reese MD      metoprolol tartrate  25 mg Oral BID Dalila Collins PA-C      multivitamin-minerals  1 tablet Oral Daily Dalila Collins PA-C      ondansetron  4 mg Intravenous Q4H PRN Dalila Collins PA-C      [START ON 7/28/2021] polyethylene glycol  17 g Oral Daily Toña Reese MD      ruxolitinib  10 mg Oral Every Other Day Dalila Collins PA-C      saccharomyces boulardii  250 mg Oral BID Dalila Collins PA-C      senna-docusate sodium  2 tablet Oral HS Dalila Collins PA-C      sodium bicarbonate  1,300 mg Oral BID after meals Dalila Collins PA-C          Today, Patient Was Seen By: Toña Reese MD    ** Please Note: Dictation voice to text software may have been used in the creation of this document   **

## 2021-07-27 NOTE — ASSESSMENT & PLAN NOTE
Lab Results   Component Value Date    EGFR 12 07/27/2021    EGFR 12 07/26/2021    EGFR 12 07/25/2021    CREATININE 3 42 (H) 07/27/2021    CREATININE 3 63 (H) 07/26/2021    CREATININE 3 58 (H) 07/25/2021   · on CKD stg V, secondary to ATN due to covid infection  · Secondary to sepCr baseline 3 4-3 8, follows with Dr Eliane Snider  · Nephrology following   · Prn dosing of IV lasix per fluid status monitoring  · Has R AVF in place: dopplers, no issue edema noted  · No indication for HD at this time  · On bicarb tablets, calcitrol

## 2021-07-27 NOTE — PHYSICAL THERAPY NOTE
Physical Therapy Cancellation Note    PT orders received chart review completed  Pt is currently having US completed and not appropriate to participate in skilled PT at this time  PT will follow and treat as medically appropriate       07/27/21 1500   PT Last Visit   PT Visit Date 07/27/21   Note Type   Note Type Treatment   Cancel Reasons Patient off floor/test     Eliana Maharaj, PT

## 2021-07-27 NOTE — UTILIZATION REVIEW
Continued Stay Review    Date: 7/27/21                       Current Patient Class: inpatient   Current Level of Care:  Critical Care     HPI:76 y o  female initially admitted on  7/15/21 with acute hypoxic respiratory failure  due to COVID - 19 PNA  Assessment/Plan: 7/27 - She continues with 15 L Mid flow NC, continued dyspnea  She has recieved lasix  due to hypoxemia on 7/26  Nephrology following  LUANN on CKD stage 5 - Cr peak at 4 9 today to 3 42 today, renal function improved RUE fistula  In place, plan for doppler  No urgent indicationfro HD at this moment            Results from last 7 days   Lab Units 07/27/21  0601 07/26/21  0602 07/25/21  0425   WBC Thousand/uL 9 73 8 30 6 01   HEMOGLOBIN g/dL 8 7* 8 3* 7 2*   HEMATOCRIT % 26 7* 24 9* 22 4*   PLATELETS Thousands/uL 325 293 246   SODIUM mmol/L 138 136 136   POTASSIUM mmol/L 5 0 4 5 4 4   CHLORIDE mmol/L 102 100 103   CO2 mmol/L 26 26 26   BUN mg/dL 90* 81* 76*   CREATININE mg/dL 3 42* 3 63* 3 58*   CALCIUM mg/dL 7 9* 8 1* 7 7*            Results from last 7 days   Lab Units 07/27/21  0601 07/26/21  0602 07/25/21  0425 07/24/21  0521 07/23/21  0523 07/22/21  0602 07/21/21  0439   GLUCOSE RANDOM mg/dL 129 132 127 114 135 125 142*       Results from last 7 days   Lab Units 07/27/21  0601 07/24/21  0521 07/22/21  0602   D-DIMER QUANTITATIVE ug/ml FEU 1 45* 3 80* 3 28*     Results from last 7 days   Lab Units 07/27/21  0917 07/27/21  0044 07/26/21  1518 07/22/21  0937   PROTIME seconds  --   --   --  16 8*   INR   --   --   --  1 36*   PTT seconds 125* 43* 94* 31         Medications:   Scheduled Medications:  atorvastatin, 40 mg, Oral, Daily With Dinner  calcitriol, 0 25 mcg, Oral, Daily  cholecalciferol, 2,000 Units, Oral, Daily  citalopram, 20 mg, Oral, Daily  dexamethasone, 8 mg, Intravenous, Q12H LONG  famotidine, 20 mg, Oral, Daily  levothyroxine, 75 mcg, Oral, Daily  melatonin, 3 mg, Oral, HS  metoprolol tartrate, 25 mg, Oral, BID  multivitamin-minerals, 1 tablet, Oral, Daily  ruxolitinib, 10 mg, Oral, Every Other Day  saccharomyces boulardii, 250 mg, Oral, BID  senna-docusate sodium, 2 tablet, Oral, HS  sodium bicarbonate, 1,300 mg, Oral, BID after meals      Continuous IV Infusions:  heparin (porcine), 3-30 Units/kg/hr (Order-Specific), Intravenous, Titrated      PRN Meds:  acetaminophen, 650 mg, Oral, Q4H PRN  albuterol, 2 puff, Inhalation, Q4H PRN  heparin (porcine), 3,200 Units, Intravenous, Q1H PRN  heparin (porcine), 6,400 Units, Intravenous, Q1H PRN  ondansetron, 4 mg, Intravenous, Q4H PRN    Nursing Orders -  VS - incentive spirometry - SCD's to le's - up with assistance -  Diet regular house     Discharge Plan: D    Network Utilization Review Department  ATTENTION: Please call with any questions or concerns to [phone] and carefully listen to the prompts so that you are directed to the right person  All voicemails are confidential   Liliana Briones all requests for admission clinical reviews, approved or denied determinations and any other requests to dedicated fax number below belonging to the campus where the patient is receiving treatment   List of dedicated fax numbers for the Facilities:  [address] DENIALS (Administrative/Medical Necessity) [phone]   [address] (Maternity/NICU/Pediatrics) [phone]   [phone] [place] [address] [address] [facility] [facility] [phone]

## 2021-07-27 NOTE — ASSESSMENT & PLAN NOTE
Lab Results   Component Value Date    HGB 8 7 (L) 07/27/2021    HGB 8 3 (L) 07/26/2021    HGB 7 2 (L) 07/25/2021   · Chronic, no sign of acute blood loss  · Cont to monitor, transfuse if < 7

## 2021-07-27 NOTE — ASSESSMENT & PLAN NOTE
· Patient presented with fever, generalized weakness, diarrhea decreasing oral intake and difficulty with ambulation for 1 week per admission record review  · COVID-19 pneumonia, unfortunately unvaccinated  · S/p completion of remdesivir x 5 days  · On increased dose of dexamethasone 8mg bid, on gi prophylaxis   · Abx dcd given low procal x 2  · Worsening d-dimer thus recommended per pulmonary to d/c eliquis and start on heparin gtt: D dimer is improving, trending still going down tomorrow, will place her back to eliquis  · Still on 15 liters mid flow   · Now on IV lasix 60mg every other day  · Cont to trend d-dimer  · Remains on stepdown - low threshold to involve critical care   · Respiratory protocol

## 2021-07-27 NOTE — ASSESSMENT & PLAN NOTE
Patient is having constipation, due probably to immobility and medications  Continue with senna and colace, miralax and dulcolax suppository was ordered  Monitor bm

## 2021-07-28 LAB
ANION GAP SERPL CALCULATED.3IONS-SCNC: 7 MMOL/L (ref 4–13)
ANISOCYTOSIS BLD QL SMEAR: PRESENT
APTT PPP: 121 SECONDS (ref 23–37)
APTT PPP: 41 SECONDS (ref 23–37)
BASOPHILS # BLD MANUAL: 0 THOUSAND/UL (ref 0–0.1)
BASOPHILS NFR MAR MANUAL: 0 % (ref 0–1)
BUN SERPL-MCNC: 89 MG/DL (ref 5–25)
CALCIUM SERPL-MCNC: 8.4 MG/DL (ref 8.3–10.1)
CHLORIDE SERPL-SCNC: 104 MMOL/L (ref 100–108)
CO2 SERPL-SCNC: 25 MMOL/L (ref 21–32)
CREAT SERPL-MCNC: 3.26 MG/DL (ref 0.6–1.3)
D DIMER PPP FEU-MCNC: 1.07 UG/ML FEU
EOSINOPHIL # BLD MANUAL: 0 THOUSAND/UL (ref 0–0.4)
EOSINOPHIL NFR BLD MANUAL: 0 % (ref 0–6)
ERYTHROCYTE [DISTWIDTH] IN BLOOD BY AUTOMATED COUNT: 15.8 % (ref 11.6–15.1)
GFR SERPL CREATININE-BSD FRML MDRD: 13 ML/MIN/1.73SQ M
GLUCOSE SERPL-MCNC: 113 MG/DL (ref 65–140)
HCT VFR BLD AUTO: 26.8 % (ref 34.8–46.1)
HGB BLD-MCNC: 8.7 G/DL (ref 11.5–15.4)
LYMPHOCYTES # BLD AUTO: 0.21 THOUSAND/UL (ref 0.6–4.47)
LYMPHOCYTES # BLD AUTO: 2 % (ref 14–44)
MCH RBC QN AUTO: 29.9 PG (ref 26.8–34.3)
MCHC RBC AUTO-ENTMCNC: 32.5 G/DL (ref 31.4–37.4)
MCV RBC AUTO: 92 FL (ref 82–98)
MICROCYTES BLD QL AUTO: PRESENT
MONOCYTES # BLD AUTO: 0.21 THOUSAND/UL (ref 0–1.22)
MONOCYTES NFR BLD: 2 % (ref 4–12)
NEUTROPHILS # BLD MANUAL: 9.93 THOUSAND/UL (ref 1.85–7.62)
NEUTS SEG NFR BLD AUTO: 95 % (ref 43–75)
NRBC BLD AUTO-RTO: 0 /100 WBCS
PLATELET # BLD AUTO: 301 THOUSANDS/UL (ref 149–390)
PLATELET BLD QL SMEAR: ADEQUATE
PMV BLD AUTO: 10.3 FL (ref 8.9–12.7)
POIKILOCYTOSIS BLD QL SMEAR: PRESENT
POLYCHROMASIA BLD QL SMEAR: PRESENT
POTASSIUM SERPL-SCNC: 4.9 MMOL/L (ref 3.5–5.3)
RBC # BLD AUTO: 2.91 MILLION/UL (ref 3.81–5.12)
RBC MORPH BLD: PRESENT
SODIUM SERPL-SCNC: 136 MMOL/L (ref 136–145)
VARIANT LYMPHS # BLD AUTO: 1 %
WBC # BLD AUTO: 10.45 THOUSAND/UL (ref 4.31–10.16)

## 2021-07-28 PROCEDURE — 99232 SBSQ HOSP IP/OBS MODERATE 35: CPT | Performed by: INTERNAL MEDICINE

## 2021-07-28 PROCEDURE — 85027 COMPLETE CBC AUTOMATED: CPT | Performed by: INTERNAL MEDICINE

## 2021-07-28 PROCEDURE — 85730 THROMBOPLASTIN TIME PARTIAL: CPT | Performed by: EMERGENCY MEDICINE

## 2021-07-28 PROCEDURE — 94760 N-INVAS EAR/PLS OXIMETRY 1: CPT

## 2021-07-28 PROCEDURE — 85379 FIBRIN DEGRADATION QUANT: CPT | Performed by: INTERNAL MEDICINE

## 2021-07-28 PROCEDURE — 85007 BL SMEAR W/DIFF WBC COUNT: CPT | Performed by: INTERNAL MEDICINE

## 2021-07-28 PROCEDURE — 97535 SELF CARE MNGMENT TRAINING: CPT

## 2021-07-28 PROCEDURE — 80048 BASIC METABOLIC PNL TOTAL CA: CPT | Performed by: INTERNAL MEDICINE

## 2021-07-28 RX ADMIN — ATORVASTATIN CALCIUM 40 MG: 40 TABLET, FILM COATED ORAL at 17:24

## 2021-07-28 RX ADMIN — RUXOLITINIB 10 MG: 10 TABLET ORAL at 09:57

## 2021-07-28 RX ADMIN — POLYETHYLENE GLYCOL 3350 17 G: 17 POWDER, FOR SOLUTION ORAL at 09:49

## 2021-07-28 RX ADMIN — Medication 250 MG: at 17:49

## 2021-07-28 RX ADMIN — CALCITRIOL CAPSULES 0.25 MCG 0.25 MCG: 0.25 CAPSULE ORAL at 10:02

## 2021-07-28 RX ADMIN — SODIUM BICARBONATE 650 MG TABLET 1300 MG: at 10:02

## 2021-07-28 RX ADMIN — MULTIPLE VITAMINS W/ MINERALS TAB 1 TABLET: TAB ORAL at 09:57

## 2021-07-28 RX ADMIN — LEVOTHYROXINE SODIUM 75 MCG: 75 TABLET ORAL at 05:45

## 2021-07-28 RX ADMIN — METOPROLOL TARTRATE 25 MG: 25 TABLET, FILM COATED ORAL at 17:24

## 2021-07-28 RX ADMIN — Medication 250 MG: at 10:03

## 2021-07-28 RX ADMIN — FAMOTIDINE 20 MG: 20 TABLET ORAL at 09:57

## 2021-07-28 RX ADMIN — DEXAMETHASONE SODIUM PHOSPHATE 8 MG: 4 INJECTION INTRA-ARTICULAR; INTRALESIONAL; INTRAMUSCULAR; INTRAVENOUS; SOFT TISSUE at 09:57

## 2021-07-28 RX ADMIN — BISACODYL 10 MG: 10 SUPPOSITORY RECTAL at 13:34

## 2021-07-28 RX ADMIN — Medication 2000 UNITS: at 09:57

## 2021-07-28 RX ADMIN — SENNOSIDES AND DOCUSATE SODIUM 2 TABLET: 8.6; 5 TABLET ORAL at 21:25

## 2021-07-28 RX ADMIN — DEXAMETHASONE SODIUM PHOSPHATE 8 MG: 4 INJECTION INTRA-ARTICULAR; INTRALESIONAL; INTRAMUSCULAR; INTRAVENOUS; SOFT TISSUE at 21:25

## 2021-07-28 RX ADMIN — CITALOPRAM HYDROBROMIDE 20 MG: 20 TABLET ORAL at 09:49

## 2021-07-28 RX ADMIN — MELATONIN TAB 3 MG 6 MG: 3 TAB at 21:25

## 2021-07-28 RX ADMIN — APIXABAN 2.5 MG: 2.5 TABLET, FILM COATED ORAL at 13:41

## 2021-07-28 RX ADMIN — METOPROLOL TARTRATE 25 MG: 25 TABLET, FILM COATED ORAL at 09:49

## 2021-07-28 NOTE — PLAN OF CARE
Problem: OCCUPATIONAL THERAPY ADULT  Goal: Performs self-care activities at highest level of function for planned discharge setting  See evaluation for individualized goals  Description: Treatment Interventions: ADL retraining, Functional transfer training, UE strengthening/ROM, Endurance training, Cognitive reorientation, Patient/family training, Equipment evaluation/education, Compensatory technique education, Continued evaluation, Energy conservation, Activityengagement          See flowsheet documentation for full assessment, interventions and recommendations  Outcome: Progressing  Note: Limitation: Decreased ADL status, Decreased UE strength, Decreased Safe judgement during ADL, Decreased sensation, Decreased cognition, Decreased self-care trans, Decreased high-level ADLs  Prognosis: Fair  Assessment: Patient participated in Skilled OT session 7/28/2021 with interventions consisting of ADL re training with the use of correct body mechnaics, Energy Conservation techniques, deep breathing technique, safety awareness and fall prevention techniques, therapeutic exercise to: increase functional use of BUEs, increase BUE muscle strength ,  therapeutic activities to: increase activity tolerance, increase standing tolerance time with unilateral UE support to complete sink level ADLs, increase dynamic sit/ stand balance during functional activity , increase postural control, increase trunk control and increase OOB/ sitting tolerance   Patient agreeable to OT treatment session, upon arrival patient was found supine in bed  In comparison to previous session, patient with improvements in bed mobility, standing tolerance/balance, endurance, static sitting, functional mobility and toileting  Patient requiring verbal cues for correct technique, verbal cues for pacing thru activity steps and frequent rest periods   Patient continues to be functioning below baseline level, occupational performance remains limited secondary to factors listed above and increased risk for falls and injury  From OT standpoint, recommendation at time of d/c would be Short Term Rehab when medically stable  Patient to benefit from continued Occupational Therapy treatment while in the hospital to address deficits as defined above and maximize level of functional independence with ADLs and functional mobility        OT Discharge Recommendation: Post acute rehabilitation services  OT - OK to Discharge: Yes (when medically stable)     Selena Schmidt MS, OTR/L

## 2021-07-28 NOTE — OCCUPATIONAL THERAPY NOTE
Occupational Therapy Treatment Note      Jorge Alberto Ser    7/28/2021    Principal Problem:    COVID-19 virus infection  Active Problems:    Chronic saddle pulmonary embolism ([facility] [address] )    Obstructive uropathy    Polycythemia vera (HCC)    Anemia in CKD (chronic kidney disease)    Acute renal failure superimposed on stage 5 chronic kidney disease, not on chronic dialysis ([facility] [address] )    History of Clostridioides difficile colitis    Class 2 severe obesity due to excess calories with serious comorbidity and body mass index (BMI) of 35 0 to 35 9 in adult [facility])    Constipation    Acute respiratory failure with hypoxia (HCC)    Edema of right upper extremity      Past Medical History:   Diagnosis Date    Anxiety     Bowel obstruction (HCC)     Chronic kidney disease (CKD), stage IV (severe) (HCC)     stage IV    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    Circulation problem     Compression fracture of cervical spine (HCC)     Hernia of abdominal cavity     History of kidney problems     Hydronephrosis     Hypertension     IBS (irritable bowel syndrome)     Incontinence     Lung mass     Improving on PET/CT 1/2016    Polycythemia vera ([facility] [address] )     Pulmonary embolism ([facility] ) 2014    Shingles     Urinary tract infection        Past Surgical History:   Procedure Laterality Date    ABDOMINAL ADHESION SURGERY N/A 8/9/2020    Procedure: LYSIS ADHESIONS;  Surgeon: Bradley Wolf DO;  [facility];  Service: General    BLADDER SUSPENSION      BOTOX INJECTION N/A 7/27/2016    Procedure: BOTOX INJECTION ;  Surgeon: Daphne Hyatt MD;  [facility];  Service:     CHOLECYSTECTOMY N/A     COLONOSCOPY      CYSTECTOMY, RADICAL WITH ILEOCONDUIT N/A 10/4/2016    Procedure: SUPRATRIGONAL CYSTECTOMY WITH ILEAL CONDUIT ;  Surgeon: Daphne Hyatt MD;  [facility];  Service:    Magdalena Crook W/ RETROGRADES Bilateral 7/27/2016    Procedure: Delfina Flanagan; RETROGRADE PYELOGRAM ;  Surgeon: Sanjay Lobato Lan Aiken MD;  [facility];  Service:     HERNIA REPAIR      IR AV FISTULAGRAM/GRAFTOGRAM  2/10/2021    IR NEPHROSTOMY TO NEPHROURETERAL STENT  5/15/2021    IR NEPHROSTOMY TO NEPHROURETERAL STENT  6/9/2021    IR NEPHROSTOMY TUBE CHECK AND/OR REMOVAL  6/16/2021    IR NEPHROSTOMY TUBE CHECK/CHANGE/REPOSITION/REINSERTION/UPSIZE  5/14/2021    IR NEPHROSTOMY TUBE CHECK/CHANGE/REPOSITION/REINSERTION/UPSIZE  5/21/2021    IR NEPHROSTOMY TUBE PLACEMENT  5/10/2021    IR NON-TUNNELED CENTRAL LINE PLACEMENT  7/17/2020    IR NON-TUNNELED CENTRAL LINE PLACEMENT  8/14/2020    IR NON-TUNNELED CENTRAL LINE PLACEMENT  7/16/2021    LAPAROTOMY N/A 8/9/2020    Procedure: LAPAROTOMY EXPLORATORY, PARASTOMAL HERNIA REPAIR WITH MESH;  Surgeon: Shira Tristan DO;  [facility];  Service: General    SC ANASTOMOSIS,AV,ANY SITE Right 1/5/2021    Procedure: Creation of right brachiobasilic fistula; Surgeon: Alcira Chairez MD;  [facility];  Service: Vascular    SC COLONOSCOPY FLX DX W/COLLJ SPEC WHEN PFRMD N/A 8/31/2016    Procedure: COLONOSCOPY;  Surgeon: Chau Howard MD;  Location: [facility]; Service: Gastroenterology    SC CYSTOSCOPY,INSERT URETERAL STENT Bilateral 7/27/2016    Procedure: STENT INSERTION; EXCISION OF MESH ;  Surgeon: Ruben Larson MD;  [facility];  Service: Urology    TONSILLECTOMY      TUBAL LIGATION      WISDOM TOOTH EXTRACTION          07/28/21 1430   OT Last Visit   OT Visit Date 07/28/21   Note Type   Note Type Treatment   Restrictions/Precautions   Weight Bearing Precautions Per Order No   Other Precautions Contact/isolation; Airborne/isolation;Cognitive;Multiple lines;Telemetry;O2;Fall Risk;Pain;Visual impairment  (12L midflow; COVID(+))   Lifestyle   Autonomy I ADLS/IADLS, transfers and functional mobility PTA   Reciprocal Relationships Pt lives w/ her son who per EMR has autism and pt is primary caregiver; pt also has 2nd son who doesn't live w/ them 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; was a head    Intrinsic Gratification Pt reports no hobbies; per EMR enjoys reading, [employer] and hanging w/ the neighbor friends   Pain Assessment   Pain Assessment Tool Pain Assessment not indicated - pt denies pain   Pain Score No Pain   ADL   Toileting Assistance  3  Moderate Assistance   Toileting Deficit Setup;Steadying;Verbal cueing; Increased time to complete;Supervison/safety; Bedside commode;Perineal hygiene   Toileting Comments Pt completed toileting on BS w/ Mod A x1 to manage perineal hygiene  Pt stood w/ CGA to<>from [facility]  Bed Mobility   Supine to Sit 4  Minimal assistance   Additional items Assist x 1;HOB elevated; Increased time required;Verbal cues   Sit to Supine Unable to assess   Additional Comments Pt sat EOB w/ Fair sitting balance/trunk control   Transfers   Sit to Stand 4  Minimal assistance   Additional items Assist x 1; Increased time required;Verbal cues   Stand to Sit 4  Minimal assistance   Additional items Assist x 1; Increased time required;Verbal cues   Toilet transfer 4  Minimal assistance   Additional items Assist x 1; Increased time required;Verbal cues; Commode   Additional Comments HHA used   Functional Mobility   Functional Mobility 4  Minimal assistance   Additional Comments Pt ambulated short in room distance w/ Min A x1; HHA used  O2 desat several times to low 80s but had poor quality reading  After seated rest break O2 improved to low 90's w/i 1 minute  Additional items Hand hold assistance   Toilet Transfers   Toilet Transfer From [employer] Transfer Type To and from   Toilet Transfer to Standard bedside commode   Toilet Transfer Technique Ambulating   Toilet Transfers Minimal assistance   Cognition   Overall Cognitive Status Impaired   Arousal/Participation Responsive; Cooperative   Attention Attends with cues to redirect   Orientation Level Oriented X4   Memory Decreased recall of precautions   Following Commands Follows one step commands without difficulty   Comments Pt is pleasant and cooperative; needs occasional cues for safety; slow to respond    Activity Tolerance   Activity Tolerance Patient limited by fatigue   Medical Staff Made Aware Yaakov MANN   Assessment   Assessment Patient participated in Skilled OT session 7/28/2021 with interventions consisting of ADL re training with the use of correct body mechnaics, Energy Conservation techniques, deep breathing technique, safety awareness and fall prevention techniques, therapeutic exercise to: increase functional use of BUEs, increase BUE muscle strength ,  therapeutic activities to: increase activity tolerance, increase standing tolerance time with unilateral UE support to complete sink level ADLs, increase dynamic sit/ stand balance during functional activity , increase postural control, increase trunk control and increase OOB/ sitting tolerance   Patient agreeable to OT treatment session, upon arrival patient was found supine in bed  In comparison to previous session, patient with improvements in bed mobility, standing tolerance/balance, endurance, static sitting, functional mobility and toileting  Patient requiring verbal cues for correct technique, verbal cues for pacing thru activity steps and frequent rest periods  Patient continues to be functioning below baseline level, occupational performance remains limited secondary to factors listed above and increased risk for falls and injury  From OT standpoint, recommendation at time of d/c would be Short Term Rehab when medically stable  Patient to benefit from continued Occupational Therapy treatment while in the hospital to address deficits as defined above and maximize level of functional independence with ADLs and functional mobility  Plan   Treatment Interventions ADL retraining;Functional transfer training;UE strengthening/ROM; Endurance training;Cognitive reorientation;Patient/family training;Equipment evaluation/education; Compensatory technique education;Continued evaluation; Activityengagement; Energy conservation   Goal Expiration Date 07/30/21   OT Treatment Day 2   OT Frequency 3-5x/wk   Recommendation   OT Discharge Recommendation Post acute rehabilitation services   OT - OK to Discharge Yes  (when medically stable)   Additional Comments  The patient's raw score on the AM-PAC Daily Activity inpatient short form is 16, standardized score is 35 96, less than 39 4  Patients at this level are likely to benefit from discharge to post-acute rehabilitation services  Please refer to the recommendation of the Occupational Therapist for safe discharge planning     AM-PAC Daily Activity Inpatient   Lower Body Dressing 2   Bathing 2   Toileting 2   Upper Body Dressing 3   Grooming 3   Eating 4   Daily Activity Raw Score 16   Daily Activity Standardized Score (Calc for Raw Score >=11) 35 96   AM-[facility] Applied Cognition Inpatient   Following a Speech/Presentation 3   Understanding Ordinary Conversation 3   Taking Medications 3   Remembering Where Things Are Placed or Put Away 3   Remembering List of 4-5 Errands 3   Taking Care of Complicated Tasks 2   Applied Cognition Raw Score 17   Applied Cognition Standardized Score 36 52       Ananda Hernandes MS, OTR/L

## 2021-07-28 NOTE — ASSESSMENT & PLAN NOTE
Lab Results   Component Value Date    EGFR 13 07/28/2021    EGFR 12 07/27/2021    EGFR 12 07/26/2021    CREATININE 3 26 (H) 07/28/2021    CREATININE 3 42 (H) 07/27/2021    CREATININE 3 63 (H) 07/26/2021   · on CKD stg V, secondary to ATN due to covid infection  · Secondary to sepCr baseline 3 4-3 8, follows with Dr Mike Brown  · Nephrology following   · Prn dosing of IV lasix per fluid status monitoring  · Has R AVF in place: dopplers, no issue edema noted  · No indication for HD at this time  · On bicarb tablets, calcitrol

## 2021-07-28 NOTE — PROGRESS NOTES
[facility]  Progress Note - Angie Tenorio [id number], 77 y o  female MRN: [id number]  Unit/Bed#: MICU 14 Encounter: [id number]  Primary Care Provider: Latia Smith MD   Date and time admitted to hospital: 7/15/2021 10:52 AM    Edema of right upper extremity  Assessment & Plan  Chronic as + AVF in place but more increased than usual as also confirmed per nephrology  Check upper extremity doppler  Already on heparin gtt    Acute respiratory failure with hypoxia ([address] )  Assessment & Plan  · In the setting of COVID-19 as above  · Plan as per above  · Now on mid flow  Remains on 15L pox much improved    Constipation  Assessment & Plan  Patient is having constipation, due probably to immobility and medications  Continue with senna and colace, miralax and dulcolax suppository was ordered  Monitor bm     Class 2 severe obesity due to excess calories with serious comorbidity and body mass index (BMI) of 35 0 to 35 9 in adult [facility])  Assessment & Plan  Body mass index is 38 67 kg/m²  · Nutrition consult once improved from above    History of Clostridioides difficile colitis  Assessment & Plan  · Diarrhea likely secondary to covid 19 infection; resolved  · Noted history of C  Diff  · C  Diff testing was negative on admission, however patient did received IV abx   Received PO Vanco for prophylaxis for 3 days post last dose of IV abx    Acute renal failure superimposed on stage 5 chronic kidney disease, not on chronic dialysis [facility])  Assessment & Plan  Lab Results   Component Value Date    EGFR 13 07/28/2021    EGFR 12 07/27/2021    EGFR 12 07/26/2021    CREATININE 3 26 (H) 07/28/2021    CREATININE 3 42 (H) 07/27/2021    CREATININE 3 63 (H) 07/26/2021   · on CKD stg V, secondary to ATN due to covid infection  · Secondary to sepCr baseline 3 4-3 8, follows with Dr Bernard Nolen  · Nephrology following   · Prn dosing of IV lasix per fluid status monitoring  · Has R AVF in place: dopplers, no issue edema noted  · No indication for HD at this time  · On bicarb tablets, calcitrol      Anemia in CKD (chronic kidney disease)  Assessment & Plan  Lab Results   Component Value Date    HGB 8 7 (L) 07/28/2021    HGB 8 7 (L) 07/27/2021    HGB 8 3 (L) 07/26/2021   · Chronic, no sign of acute blood loss  · Cont to monitor, transfuse if < 7     Polycythemia vera ([facility] [address] )  Assessment & Plan  · Patient follows up with outpatient Hematology Oncology, currently on Jakafi     Obstructive uropathy  Assessment & Plan  · S/p ileostomy for nonfunctioning bladder and chronic hydro  · OP urology follow up    Chronic saddle pulmonary embolism ([facility] [address] )  Assessment & Plan  · On heparin gtt; switched due to elevated d-dimer as recommended per pulmonary      * COVID-19 virus infection  Assessment & Plan  · Patient presented with fever, generalized weakness, diarrhea decreasing oral intake and difficulty with ambulation for 1 week per admission record review  · COVID-19 pneumonia, unfortunately unvaccinated  · S/p completion of remdesivir x 5 days  · On increased dose of dexamethasone 8mg bid, on gi prophylaxis   · Abx dcd given low procal x 2  · Worsening d-dimer thus recommended per pulmonary to d/c eliquis and start on heparin gtt: D dimer is improving, trending still going down tomorrow, will place her back to eliquis  · Still on 15 liters mid flow   · Now on IV lasix 60mg every other day  · Cont to trend d-dimer  · Remains on stepdown - low threshold to involve critical care   · Respiratory protocol           VTE Pharmacologic Prophylaxis:   Pharmacologic: Apixaban (Eliquis)  Mechanical VTE Prophylaxis in Place: Yes    Patient Centered Rounds: I have performed bedside rounds with nursing staff today  Discussions with Specialists or Other Care Team Provider:     Education and Discussions with Family / Patient: Care plan discussed with patient who voiced understanding and agrees with recommendations        Time Spent for Care: 30 minutes  More than 50% of total time spent on counseling and coordination of care as described above  Current Length of Stay: 13 day(s)    Current Patient Status: Inpatient   Certification Statement: The patient will continue to require additional inpatient hospital stay due to Treatment of COVID pneumonia    Discharge Plan: To be determined    Code Status: Level 1 - Full Code      Subjective:   Patient looks well and currently on 6 L nasal cannula with SpO2 of 100%; continue present therapy  Objective:     Vitals:   Temp (24hrs), Av 1 °F (36 7 °C), Min:98 °F (36 7 °C), Max:98 2 °F (36 8 °C)    Temp:  [98 °F (36 7 °C)-98 2 °F (36 8 °C)] 98 °F (36 7 °C)  HR:  [50-69] 62  BP: (119-163)/() 119/73  SpO2:  [98 %-100 %] 99 %  Body mass index is 38 67 kg/m²  Input and Output Summary (last 24 hours): Intake/Output Summary (Last 24 hours) at 2021 1843  Last data filed at 2021 1345  Gross per 24 hour   Intake 150 77 ml   Output 1550 ml   Net -1399 23 ml       Physical Exam:     Physical Exam  Vitals and nursing note reviewed  Constitutional:       General: She is not in acute distress  Appearance: She is well-developed  HENT:      Head: Normocephalic and atraumatic  Eyes:      Conjunctiva/sclera: Conjunctivae normal    Cardiovascular:      Rate and Rhythm: Normal rate and regular rhythm  Heart sounds: No murmur heard  Pulmonary:      Effort: Pulmonary effort is normal  No respiratory distress  Comments: Reduced breath sounds  Abdominal:      Palpations: Abdomen is soft  Tenderness: There is no abdominal tenderness  Musculoskeletal:      Cervical back: Neck supple  Comments: Edema right> left upper extremity   Skin:     General: Skin is warm and dry  Neurological:      Mental Status: She is alert and oriented to person, place, and time     Psychiatric:      Comments: Depressed mood           Additional Data:     Labs:    Results from last 7 days   Lab Units 07/28/21  0545   WBC Thousand/uL 10 45*   HEMOGLOBIN g/dL 8 7*   HEMATOCRIT % 26 8*   PLATELETS Thousands/uL 301   LYMPHO PCT % 2*   MONO PCT % 2*   EOS PCT % 0     Results from last 7 days   Lab Units 07/28/21  0545   SODIUM mmol/L 136   POTASSIUM mmol/L 4 9   CHLORIDE mmol/L 104   CO2 mmol/L 25   BUN mg/dL 89*   CREATININE mg/dL 3 26*   ANION GAP mmol/L 7   CALCIUM mg/dL 8 4   GLUCOSE RANDOM mg/dL 113     Results from last 7 days   Lab Units 07/22/21  0937   INR  1 36*                       * I Have Reviewed All Lab Data Listed Above  * Additional Pertinent Lab Tests Reviewed:  All Labs Within Last 24 Hours Reviewed    Imaging:    Imaging Reports Reviewed Today Include:   Imaging Personally Reviewed by Myself Includes:      Recent Cultures (last 7 days):           Last 24 Hours Medication List:   Current Facility-Administered Medications   Medication Dose Route Frequency Provider Last Rate    acetaminophen  650 mg Oral Q4H PRN Cloteal BernadineDIMITRI motley      albuterol  2 puff Inhalation Q4H PRN Cloteal DIMITRI Colindres      apixaban  2 5 mg Oral BID Maxine Babin MD      atorvastatin  40 mg Oral Daily With Micron TechnologyDIMITRI      bisacodyl  10 mg Rectal Daily Maxine Babin MD      calcitriol  0 25 mcg Oral Daily Cloteal BernadineDIMITRI motley      cholecalciferol  2,000 Units Oral Daily Cloteal BernadineDIMITRI motley      citalopram  20 mg Oral Daily Cloteal BernadineDIMITRI motley      dexamethasone  8 mg Intravenous Q12H [address] Cloteal BernadineDIMITRI motley      famotidine  20 mg Oral Daily Cloteal BernadineDIMITRI motley      levothyroxine  75 mcg Oral Daily Cloteal Bernadine, [place]      melatonin  6 mg Oral HS Maxine Babin MD      metoprolol tartrate  25 mg Oral BID Cloteal DIMITRI Colindres      multivitamin-minerals  1 tablet Oral Daily Cloteal DIMITRI Colindres      ondansetron  4 mg Intravenous Q4H PRN Dainaeal DIMITRI Colindres      polyethylene glycol  17 g Oral Daily Maxine Babin MD      ruxolitinib 10 mg Oral Every Other Day Lan Farnsworth PA-C      saccharomyces boulardii  250 mg Oral BID Lan Farnsworth [place]      senna-docusate sodium  2 tablet Oral HS Lan Farnsworth PA-C      sodium bicarbonate  1,300 mg Oral BID after meals Lan Farnsworth PA-C          Today, Patient Was Seen By: Arabella Cerda MD    ** Please Note: Dictation voice to text software may have been used in the creation of this document   **

## 2021-07-28 NOTE — ASSESSMENT & PLAN NOTE
Lab Results   Component Value Date    HGB 8 7 (L) 07/28/2021    HGB 8 7 (L) 07/27/2021    HGB 8 3 (L) 07/26/2021   · Chronic, no sign of acute blood loss  · Cont to monitor, transfuse if < 7

## 2021-07-28 NOTE — PROGRESS NOTES
NEPHROLOGY PROGRESS NOTE   Michael Huber 77 y o  female MRN: [id number]  Unit/Bed#: MICU 14 Encounter: [id number]      ASSESSMENT & PLAN:  1  Acute kidney injury on top of Chronic kidney disease stage 5  Baseline creatinine in the low to mid threes  Creatinine peak at 4 9 on 7/15, renal function improving with a creatinine down to 3 26 today  No indication for dialysis at this moment  Follow daily labs and monitor ins and outs  Avoid relative hypotension    2  COVID-19 virus infection, management per Medicine team    3  Acute respiratory failure with hypoxemia in the setting of #2  Okay to use diuretics p r n  If hypoxemia worsens    4  Hemodynamics, blood pressure stable, avoid relative hypotension    5  Anemia multifactorial in setting of CKD, noted patient with history of polycythemia vera, monitor H&H and transfusion as needed    6  Right upper extremity AV fistula in place, right upper extremity edema, right upper extremity Doppler did not show any DVT and patent fistula    7  Constipation, per primary team        SUBJECTIVE:  Patient seen through window given COVID-19 infection  Lying in bed awake, in no acute distress, denies any significant chest pain or shortness of breath  No nausea, no vomiting    No acute issues overnight per nurse      OBJECTIVE:  Current Weight: Weight - Scale: 89 8 kg (198 lb)  Vitals:    07/28/21 0027   BP: 132/81   Pulse: (!) 50   Resp:    Temp: 98 °F (36 7 °C)   SpO2: 100%       Intake/Output Summary (Last 24 hours) at 7/28/2021 1525  Last data filed at 7/28/2021 0601  Gross per 24 hour   Intake 140 89 ml   Output 1100 ml   Net -959 11 ml     General:  Lying in bed, cooperative, in not acute distress  Eyes: conjunctivae pink, anicteric sclerae, no periorbital edema  ENT: lips and mucous membranes moist, oxygen via nasal cannula  Neck: supple, no JVD  Chest:  No increased work of breathing  CVS:  Telemetry reviewed, bradycardic  Abdomen:  Nondistended  Extremities: no significant edema of both legs, right upper extremity edema  Skin: no rash  Neuro: awake, alert, interactive          Medications:    Current Facility-Administered Medications:     acetaminophen (TYLENOL) tablet 650 mg, 650 mg, Oral, Q4H PRN, Annella , PA-C    albuterol (PROVENTIL HFA,VENTOLIN HFA) inhaler 2 puff, 2 puff, Inhalation, Q4H PRN, Annella , PA-C    atorvastatin (LIPITOR) tablet 40 mg, 40 mg, Oral, Daily With Dinner, Annella , PA-C, 40 mg at 07/27/21 1816    bisacodyl (DULCOLAX) rectal suppository 10 mg, 10 mg, Rectal, Daily, [place], MD    calcitriol (ROCALTROL) capsule 0 25 mcg, 0 25 mcg, Oral, Daily, Annella , PA-C, 0 25 mcg at 07/27/21 5745    cholecalciferol (VITAMIN D3) tablet 2,000 Units, 2,000 Units, Oral, Daily, Annella , PA-C, 2,000 Units at 07/27/21 0908    citalopram (CeleXA) tablet 20 mg, 20 mg, Oral, Daily, Annella , PA-C, 20 mg at 07/27/21 0908    dexamethasone (DECADRON) injection 8 mg, 8 mg, Intravenous, Q12H [address], Annella , PA-C, 8 mg at 07/27/21 2119    famotidine (PEPCID) tablet 20 mg, 20 mg, Oral, Daily, Annella , PA-C, 20 mg at 07/27/21 0908    heparin (porcine) 25,000 units in 0 45% NaCl 250 mL infusion (premix), 3-30 Units/kg/hr (Order-Specific), Intravenous, Titrated, Claire Rose DO, Last Rate: 2 4 mL/hr at 07/28/21 0321, 3 Units/kg/hr at 07/28/21 0321    heparin (porcine) injection 3,200 Units, 3,200 Units, Intravenous, Q1H PRN, Annella , PA-C, 3,200 Units at 07/27/21 1909    heparin (porcine) injection 6,400 Units, 6,400 Units, Intravenous, Q1H PRN, Christian Sorensen PA-C    levothyroxine tablet 75 mcg, 75 mcg, Oral, Daily, Christian Sorensen PA-C, 75 mcg at 07/28/21 0545    melatonin tablet 6 mg, 6 mg, Oral, HS, [place], MD, 6 mg at 07/27/21 2119    metoprolol tartrate (LOPRESSOR) tablet 25 mg, 25 mg, Oral, BID, Christian Sorensen PA-C, 25 mg at 07/27/21 1816    [COMPLETED] zinc sulfate (ZINCATE) capsule 220 mg, 220 mg, Oral, Daily, 220 mg at 07/21/21 0859 **FOLLOWED BY** multivitamin-minerals (CENTRUM ADULTS) tablet 1 tablet, 1 tablet, Oral, Daily, Cyndra Sergeant, PA-C, 1 tablet at 07/27/21 0908    ondansetron (ZOFRAN) injection 4 mg, 4 mg, Intravenous, Q4H PRN, Cyndra Sergeant, PA-C, 4 mg at 07/17/21 0845    polyethylene glycol (MIRALAX) packet 17 g, 17 g, Oral, Daily, Kory Sykes MD    ruxolitinib (JAKAFI) tablet 10 mg, 10 mg, Oral, Every Other Day, Cyndra Sergeant, PA-C, 10 mg at 07/26/21 5156    saccharomyces boulardii (FLORASTOR) capsule 250 mg, 250 mg, Oral, BID, Cyndra Sergeant, PA-C, 250 mg at 07/27/21 1816    senna-docusate sodium (SENOKOT S) 8 6-50 mg per tablet 2 tablet, 2 tablet, Oral, HS, Cyndra Sergeant, PA-C, 2 tablet at 07/27/21 2119    sodium bicarbonate tablet 1,300 mg, 1,300 mg, Oral, BID after meals, Cyndra Sergeant, PA-C, 1,300 mg at 07/27/21 0908    Invasive Devices:        Lab Results:   Results from last 7 days   Lab Units 07/28/21  0545 07/27/21  0601 07/26/21  0602   WBC Thousand/uL 10 45* 9 73 8 30   HEMOGLOBIN g/dL 8 7* 8 7* 8 3*   HEMATOCRIT % 26 8* 26 7* 24 9*   PLATELETS Thousands/uL 301 325 293   SODIUM mmol/L 136 138 136   POTASSIUM mmol/L 4 9 5 0 4 5   CHLORIDE mmol/L 104 102 100   CO2 mmol/L 25 26 26   BUN mg/dL 89* 90* 81*   CREATININE mg/dL 3 26* 3 42* 3 63*   CALCIUM mg/dL 8 4 7 9* 8 1*         Portions of the record may have been created with voice recognition software  Occasional wrong word or "sound a like" substitutions may have occurred due to the inherent limitations of voice recognition software  Read the chart carefully and recognize, using context, where substitutions have occurred  If you have any questions, please contact the dictating provider

## 2021-07-29 LAB
ANION GAP SERPL CALCULATED.3IONS-SCNC: 8 MMOL/L (ref 4–13)
BUN SERPL-MCNC: 96 MG/DL (ref 5–25)
CALCIUM SERPL-MCNC: 8.5 MG/DL (ref 8.3–10.1)
CHLORIDE SERPL-SCNC: 105 MMOL/L (ref 100–108)
CO2 SERPL-SCNC: 24 MMOL/L (ref 21–32)
CREAT SERPL-MCNC: 3.29 MG/DL (ref 0.6–1.3)
ERYTHROCYTE [DISTWIDTH] IN BLOOD BY AUTOMATED COUNT: 15.8 % (ref 11.6–15.1)
GFR SERPL CREATININE-BSD FRML MDRD: 13 ML/MIN/1.73SQ M
GLUCOSE SERPL-MCNC: 145 MG/DL (ref 65–140)
HCT VFR BLD AUTO: 25.8 % (ref 34.8–46.1)
HGB BLD-MCNC: 8 G/DL (ref 11.5–15.4)
MCH RBC QN AUTO: 28.8 PG (ref 26.8–34.3)
MCHC RBC AUTO-ENTMCNC: 31 G/DL (ref 31.4–37.4)
MCV RBC AUTO: 93 FL (ref 82–98)
NRBC BLD AUTO-RTO: 0 /100 WBCS
PLATELET # BLD AUTO: 261 THOUSANDS/UL (ref 149–390)
PMV BLD AUTO: 10.2 FL (ref 8.9–12.7)
POTASSIUM SERPL-SCNC: 5.6 MMOL/L (ref 3.5–5.3)
RBC # BLD AUTO: 2.78 MILLION/UL (ref 3.81–5.12)
SODIUM SERPL-SCNC: 137 MMOL/L (ref 136–145)
WBC # BLD AUTO: 8.85 THOUSAND/UL (ref 4.31–10.16)

## 2021-07-29 PROCEDURE — 99232 SBSQ HOSP IP/OBS MODERATE 35: CPT | Performed by: INTERNAL MEDICINE

## 2021-07-29 PROCEDURE — 97116 GAIT TRAINING THERAPY: CPT

## 2021-07-29 PROCEDURE — 80048 BASIC METABOLIC PNL TOTAL CA: CPT | Performed by: INTERNAL MEDICINE

## 2021-07-29 PROCEDURE — 85027 COMPLETE CBC AUTOMATED: CPT | Performed by: INTERNAL MEDICINE

## 2021-07-29 PROCEDURE — 97530 THERAPEUTIC ACTIVITIES: CPT

## 2021-07-29 RX ADMIN — Medication 2000 UNITS: at 09:06

## 2021-07-29 RX ADMIN — APIXABAN 2.5 MG: 2.5 TABLET, FILM COATED ORAL at 17:49

## 2021-07-29 RX ADMIN — FAMOTIDINE 20 MG: 20 TABLET ORAL at 09:05

## 2021-07-29 RX ADMIN — LEVOTHYROXINE SODIUM 75 MCG: 75 TABLET ORAL at 06:08

## 2021-07-29 RX ADMIN — APIXABAN 2.5 MG: 2.5 TABLET, FILM COATED ORAL at 09:06

## 2021-07-29 RX ADMIN — METOPROLOL TARTRATE 25 MG: 25 TABLET, FILM COATED ORAL at 09:06

## 2021-07-29 RX ADMIN — MULTIPLE VITAMINS W/ MINERALS TAB 1 TABLET: TAB ORAL at 09:05

## 2021-07-29 RX ADMIN — Medication 250 MG: at 17:49

## 2021-07-29 RX ADMIN — Medication 250 MG: at 09:07

## 2021-07-29 RX ADMIN — DEXAMETHASONE SODIUM PHOSPHATE 8 MG: 4 INJECTION INTRA-ARTICULAR; INTRALESIONAL; INTRAMUSCULAR; INTRAVENOUS; SOFT TISSUE at 09:06

## 2021-07-29 RX ADMIN — METOPROLOL TARTRATE 25 MG: 25 TABLET, FILM COATED ORAL at 17:49

## 2021-07-29 RX ADMIN — DEXAMETHASONE SODIUM PHOSPHATE 8 MG: 4 INJECTION INTRA-ARTICULAR; INTRALESIONAL; INTRAMUSCULAR; INTRAVENOUS; SOFT TISSUE at 22:00

## 2021-07-29 RX ADMIN — SENNOSIDES AND DOCUSATE SODIUM 2 TABLET: 8.6; 5 TABLET ORAL at 22:00

## 2021-07-29 RX ADMIN — CITALOPRAM HYDROBROMIDE 20 MG: 20 TABLET ORAL at 09:06

## 2021-07-29 RX ADMIN — SODIUM BICARBONATE 650 MG TABLET 1300 MG: at 09:07

## 2021-07-29 RX ADMIN — SODIUM BICARBONATE 650 MG TABLET 1300 MG: at 17:49

## 2021-07-29 RX ADMIN — CALCITRIOL CAPSULES 0.25 MCG 0.25 MCG: 0.25 CAPSULE ORAL at 09:07

## 2021-07-29 RX ADMIN — MELATONIN TAB 3 MG 6 MG: 3 TAB at 22:00

## 2021-07-29 RX ADMIN — ATORVASTATIN CALCIUM 40 MG: 40 TABLET, FILM COATED ORAL at 17:49

## 2021-07-29 NOTE — ASSESSMENT & PLAN NOTE
· Patient presented with fever, generalized weakness, diarrhea decreasing oral intake and difficulty with ambulation for 1 week per admission record review  · COVID-19 pneumonia, unfortunately unvaccinated  · S/p completion of remdesivir x 5 days  · On increased dose of dexamethasone 8mg bid, on gi prophylaxis   · Abx dcd given low procal x 2  · Worsening d-dimer thus recommended per pulmonary to d/c eliquis and start on heparin gtt: D dimer is improving, trending still going down tomorrow, will place her back to eliquis  · Still on 15 liters mid flow   · Now on IV lasix 60mg every other day  · Cont to trend d-dimer  · Remains on stepdown - low threshold to involve critical care   · Respiratory protocol     7/29-overall respiratory status improving; continue present therapy  Currently on 6 L nasal cannula  Patient followed by Nephrology and renal function stable  Started low potassium diet; step-down to med surge  Continue isolation for additional 5 days

## 2021-07-29 NOTE — PROGRESS NOTES
NEPHROLOGY PROGRESS NOTE   Carlos Hayes 77 y o  female MRN: [id number]  Unit/Bed#: MICU 14 Encounter: [id number]      ASSESSMENT & PLAN:  1  Acute kidney injury on top of Chronic kidney disease stage 5  Baseline creatinine in the low to mid threes  Creatinine peak at 4 9 on 7/15, renal function improving with a creatinine down to 3 29 today  No urgent indication for dialysis  Follow daily labs and monitor ins and outs  Avoid relative hypotension    2  COVID-19 virus infection, management per Medicine team    3  Acute respiratory failure with hypoxemia in the setting of #2  Okay to use diuretics p r n  If hypoxemia worsens  Oxygen requirement improving    4  Hemodynamics, blood pressure stable, avoid relative hypotension    5  Anemia multifactorial in setting of CKD, noted patient with history of polycythemia vera, monitor H&H and transfusion as needed    6  Right upper extremity AV fistula in place, right upper extremity edema, right upper extremity Doppler did not show any DVT and fistula is patent    7  Constipation, improved with bowel regimen    8  Mild hyperkalemia, change diet to low-sodium low-potassium, will follow labs tomorrow, if hyperkalemia persists recommend medical management    My plan and recommendations were discussed with Internal Medicine team via [facility] text      SUBJECTIVE:  Patient seen  Denies any significant complaints other than feeling tired, no acute events overnight  Oxygen requirements decreasing per nurse    Patient denies any headache and chest pain, shortness of breath, no nausea, no vomiting, had a bowel movement      OBJECTIVE:  Current Weight: Weight - Scale: 89 8 kg (198 lb)  Vitals:    07/29/21 0830   BP: 142/74   Pulse: 56   Resp: (!) 28   Temp: 97 9 °F (36 6 °C)   SpO2: 99%       Intake/Output Summary (Last 24 hours) at 7/29/2021 1328  Last data filed at 7/29/2021 0901  Gross per 24 hour   Intake --   Output 1225 ml   Net -1225 ml     General:  Sitting in a recliner, in not acute distress  Eyes:  No periorbital edema  ENT: lips and mucous membranes moist  Neck: supple, no JVD  Chest:  No increased work of breathing  CVS:  Telemetry reviewed, regular rhythm  Abdomen:  Nondistended  Extremities:  Minimal edema of both legs, left upper extremity edema persisted  Skin: no rash  Neuro: awake, alert, oriented        Medications:    Current Facility-Administered Medications:     acetaminophen (TYLENOL) tablet 650 mg, 650 mg, Oral, Q4H PRN, Evonnie Dance, PA-C    albuterol (PROVENTIL HFA,VENTOLIN HFA) inhaler 2 puff, 2 puff, Inhalation, Q4H PRN, Evonnie Dance, PA-C    apixaban (ELIQUIS) tablet 2 5 mg, 2 5 mg, Oral, BID, Olivia Tolentino MD, 2 5 mg at 07/29/21 0906    atorvastatin (LIPITOR) tablet 40 mg, 40 mg, Oral, Daily With Dinner, Evonnie Dance, PA-C, 40 mg at 07/28/21 1724    bisacodyl (DULCOLAX) rectal suppository 10 mg, 10 mg, Rectal, Daily, Olivia Tolentino MD, 10 mg at 07/28/21 1334    calcitriol (ROCALTROL) capsule 0 25 mcg, 0 25 mcg, Oral, Daily, Evonnie Dance, PA-C, 0 25 mcg at 07/29/21 2207    cholecalciferol (VITAMIN D3) tablet 2,000 Units, 2,000 Units, Oral, Daily, Evonnie Dance, PA-C, 2,000 Units at 07/29/21 0906    citalopram (CeleXA) tablet 20 mg, 20 mg, Oral, Daily, Evonnie Dance, PA-C, 20 mg at 07/29/21 0906    dexamethasone (DECADRON) injection 8 mg, 8 mg, Intravenous, Q12H [address], Isela H DIMITRI Funes, 8 mg at 07/29/21 6582    famotidine (PEPCID) tablet 20 mg, 20 mg, Oral, Daily, Evonnie Dance, PA-C, 20 mg at 07/29/21 6294    levothyroxine tablet 75 mcg, 75 mcg, Oral, Daily, Evonnie Dance, PA-C, 75 mcg at 07/29/21 0608    melatonin tablet 6 mg, 6 mg, Oral, HS, Olivia Tolentino MD, 6 mg at 07/28/21 2125    metoprolol tartrate (LOPRESSOR) tablet 25 mg, 25 mg, Oral, BID, Evonnie Dance, PA-C, 25 mg at 07/29/21 0906    [COMPLETED] zinc sulfate (ZINCATE) capsule 220 mg, 220 mg, Oral, Daily, 220 mg at 07/21/21 7534 **FOLLOWED BY** multivitamin-minerals (CENTRUM ADULTS) tablet 1 tablet, 1 tablet, Oral, Daily, Sherrel Verna, PA-C, 1 tablet at 07/29/21 0905    ondansetron (ZOFRAN) injection 4 mg, 4 mg, Intravenous, Q4H PRN, Sherrel Verna, PA-C, 4 mg at 07/17/21 0845    polyethylene glycol (MIRALAX) packet 17 g, 17 g, Oral, Daily, Bradley Francois MD, 17 g at 07/28/21 0949    ruxolitinib (JAKAFI) tablet 10 mg, 10 mg, Oral, Every Other Day, Sherrel Verna, PA-C, 10 mg at 07/28/21 0957    saccharomyces boulardii (FLORASTOR) capsule 250 mg, 250 mg, Oral, BID, Sherrel Verna, PA-C, 250 mg at 07/29/21 2848    senna-docusate sodium (SENOKOT S) 8 6-50 mg per tablet 2 tablet, 2 tablet, Oral, HS, Sherrel Verna, PA-C, 2 tablet at 07/28/21 2125    sodium bicarbonate tablet 1,300 mg, 1,300 mg, Oral, BID after meals, Sherrel Verna, PA-C, 1,300 mg at 07/29/21 1385    Invasive Devices:        Lab Results:   Results from last 7 days   Lab Units 07/29/21  0607 07/29/21  0442 07/28/21  0545 07/27/21  0601   WBC Thousand/uL 8 85  --  10 45* 9 73   HEMOGLOBIN g/dL 8 0*  --  8 7* 8 7*   HEMATOCRIT % 25 8*  --  26 8* 26 7*   PLATELETS Thousands/uL 261  --  301 325   SODIUM mmol/L  --  137 136 138   POTASSIUM mmol/L  --  5 6* 4 9 5 0   CHLORIDE mmol/L  --  105 104 102   CO2 mmol/L  --  24 25 26   BUN mg/dL  --  96* 89* 90*   CREATININE mg/dL  --  3 29* 3 26* 3 42*   CALCIUM mg/dL  --  8 5 8 4 7 9*         Portions of the record may have been created with voice recognition software  Occasional wrong word or "sound a like" substitutions may have occurred due to the inherent limitations of voice recognition software  Read the chart carefully and recognize, using context, where substitutions have occurred  If you have any questions, please contact the dictating provider

## 2021-07-29 NOTE — NUTRITION
Monitor diet tolerance and % PO intake, if not improved consider enteral feeding  Request nursing obtain current weight please

## 2021-07-29 NOTE — PLAN OF CARE
Problem: PHYSICAL THERAPY ADULT  Goal: Performs mobility at highest level of function for planned discharge setting  See evaluation for individualized goals  Description: Treatment/Interventions: Functional transfer training, LE strengthening/ROM, Therapeutic exercise, Elevations, Endurance training, Equipment eval/education, Bed mobility, Gait training, Spoke to nursing, Spoke to case management          See flowsheet documentation for full assessment, interventions and recommendations  Outcome: Progressing  Note: Prognosis: Good  Problem List: Decreased strength, Decreased endurance, Impaired balance, Decreased mobility, Pain  Assessment: Pt seen for session for setup, bed mob, time spent EOB, transfers, minimal gait, repositioning  Pt cooperative w/ session, but limited by some hypoxia (O2 decreased to low 90s w/ gait), dizziness, weakness  continues to need increased assist for mobility tasks, and continue to recommend rehab at d/c  Barriers to Discharge: Inaccessible home environment, Decreased caregiver support        PT Discharge Recommendation: Post acute rehabilitation services     PT - OK to Discharge: (S) Yes (when stable to rehab)    See flowsheet documentation for full assessment

## 2021-07-29 NOTE — ASSESSMENT & PLAN NOTE
Lab Results   Component Value Date    EGFR 13 07/29/2021    EGFR 13 07/28/2021    EGFR 12 07/27/2021    CREATININE 3 29 (H) 07/29/2021    CREATININE 3 26 (H) 07/28/2021    CREATININE 3 42 (H) 07/27/2021   · on CKD stg V, secondary to ATN due to covid infection  · Secondary to sepCr baseline 3 4-3 8, follows with Dr Sandy Ford  · Nephrology following   · Prn dosing of IV lasix per fluid status monitoring  · Has R AVF in place: dopplers, no issue edema noted  · No indication for HD at this time  · On bicarb tablets, calcitrol

## 2021-07-29 NOTE — ASSESSMENT & PLAN NOTE
Lab Results   Component Value Date    HGB 8 0 (L) 07/29/2021    HGB 8 7 (L) 07/28/2021    HGB 8 7 (L) 07/27/2021   · Chronic, no sign of acute blood loss  · Cont to monitor, transfuse if < 7

## 2021-07-29 NOTE — PROGRESS NOTES
[facility]  Progress Note - Kati Griffin [birth year], 77 y o  female MRN: [id number]  Unit/Bed#: MICU 14 Encounter: [id number]  Primary Care Provider: Jevon Warren MD   Date and time admitted to hospital: 7/15/2021 10:52 AM    Edema of right upper extremity  Assessment & Plan  Chronic as + AVF in place but more increased than usual as also confirmed per nephrology  Check upper extremity doppler  Already on heparin gtt    Acute respiratory failure with hypoxia ([facility] [address] )  Assessment & Plan  · In the setting of COVID-19 as above  · Plan as per above  · Now saturating well on 6 L; much improved    Constipation  Assessment & Plan  Patient is having constipation, due probably to immobility and medications  Continue with senna and colace, miralax and dulcolax suppository was ordered  Monitor bm     Class 2 severe obesity due to excess calories with serious comorbidity and body mass index (BMI) of 35 0 to 35 9 in adult [facility])  Assessment & Plan  Body mass index is 38 67 kg/m²  · Nutrition consult once improved from above    History of Clostridioides difficile colitis  Assessment & Plan  · Diarrhea likely secondary to covid 19 infection; resolved  · Noted history of C  Diff  · C  Diff testing was negative on admission, however patient did received IV abx   Received PO Vanco for prophylaxis for 3 days post last dose of IV abx    Acute renal failure superimposed on stage 5 chronic kidney disease, not on chronic dialysis [facility])  Assessment & Plan  Lab Results   Component Value Date    EGFR 13 07/29/2021    EGFR 13 07/28/2021    EGFR 12 07/27/2021    CREATININE 3 29 (H) 07/29/2021    CREATININE 3 26 (H) 07/28/2021    CREATININE 3 42 (H) 07/27/2021   · on CKD stg V, secondary to ATN due to covid infection  · Secondary to sepCr baseline 3 4-3 8, follows with Dr Stef Campbell  · Nephrology following   · Prn dosing of IV lasix per fluid status monitoring  · Has R AVF in place: dopplers, no issue edema noted  · No indication for HD at this time  · On bicarb tablets, calcitrol      Anemia in CKD (chronic kidney disease)  Assessment & Plan  Lab Results   Component Value Date    HGB 8 0 (L) 07/29/2021    HGB 8 7 (L) 07/28/2021    HGB 8 7 (L) 07/27/2021   · Chronic, no sign of acute blood loss  · Cont to monitor, transfuse if < 7     Polycythemia vera ([address] )  Assessment & Plan  · Patient follows up with outpatient Hematology Oncology, currently on Jakafi     Obstructive uropathy  Assessment & Plan  · S/p ileostomy for nonfunctioning bladder and chronic hydro  · OP urology follow up    Chronic saddle pulmonary embolism (HCC)  Assessment & Plan  · On apixaban 2 5 mg b i d ; overall respiratory status improving      * COVID-19 virus infection  Assessment & Plan  · Patient presented with fever, generalized weakness, diarrhea decreasing oral intake and difficulty with ambulation for 1 week per admission record review  · COVID-19 pneumonia, unfortunately unvaccinated  · S/p completion of remdesivir x 5 days  · On increased dose of dexamethasone 8mg bid, on gi prophylaxis   · Abx dcd given low procal x 2  · Worsening d-dimer thus recommended per pulmonary to d/c eliquis and start on heparin gtt: D dimer is improving, trending still going down tomorrow, will place her back to eliquis  · Still on 15 liters mid flow   · Now on IV lasix 60mg every other day  · Cont to trend d-dimer  · Remains on stepdown - low threshold to involve critical care   · Respiratory protocol     7/29-overall respiratory status improving; continue present therapy  Currently on 6 L nasal cannula  Patient followed by Nephrology and renal function stable  Started low potassium diet; step-down to med surge  Continue isolation for additional 5 days            VTE Pharmacologic Prophylaxis:   Pharmacologic: Apixaban (Eliquis)  Mechanical VTE Prophylaxis in Place: Yes    Patient Centered Rounds: I have performed bedside rounds with nursing staff today     Discussions with Specialists or Other Care Team Provider:     Education and Discussions with Family / Patient: Care plan discussed with patient who voiced understanding and agrees with recommendations  Time Spent for Care: 30 minutes  More than 50% of total time spent on counseling and coordination of care as described above  Current Length of Stay: 14 day(s)    Current Patient Status: Inpatient   Certification Statement: The patient will continue to require additional inpatient hospital stay due to treaatment of covid pna    Discharge Plan: TBD    Code Status: Level 1 - Full Code      Subjective:   NAEON    Objective:     Vitals:   Temp (24hrs), Av 8 °F (36 6 °C), Min:97 7 °F (36 5 °C), Max:97 9 °F (36 6 °C)    Temp:  [97 7 °F (36 5 °C)-97 9 °F (36 6 °C)] 97 9 °F (36 6 °C)  HR:  [50-68] 60  Resp:  [9-28] 14  BP: (104-153)/(56-78) 147/78  SpO2:  [93 %-100 %] 100 %  Body mass index is 38 67 kg/m²  Input and Output Summary (last 24 hours): Intake/Output Summary (Last 24 hours) at 2021 1710  Last data filed at 2021 1300  Gross per 24 hour   Intake --   Output 1225 ml   Net -1225 ml       Physical Exam:     Physical Exam  Vitals and nursing note reviewed  Constitutional:       General: She is not in acute distress  Appearance: She is well-developed  HENT:      Head: Normocephalic and atraumatic  Eyes:      Conjunctiva/sclera: Conjunctivae normal    Cardiovascular:      Rate and Rhythm: Normal rate and regular rhythm  Heart sounds: No murmur heard  Pulmonary:      Effort: Pulmonary effort is normal  No respiratory distress  Comments: Reduced breath sounds  Abdominal:      Palpations: Abdomen is soft  Tenderness: There is no abdominal tenderness  Musculoskeletal:      Cervical back: Neck supple  Comments: Edema right> left upper extremity   Skin:     General: Skin is warm and dry     Neurological:      Mental Status: She is alert and oriented to person, place, and time  Psychiatric:      Comments: Depressed mood           Additional Data:     Labs:    Results from last 7 days   Lab Units 07/29/21  0607 07/28/21  0545   WBC Thousand/uL 8 85 10 45*   HEMOGLOBIN g/dL 8 0* 8 7*   HEMATOCRIT % 25 8* 26 8*   PLATELETS Thousands/uL 261 301   LYMPHO PCT %  --  2*   MONO PCT %  --  2*   EOS PCT %  --  0     Results from last 7 days   Lab Units 07/29/21  0442   SODIUM mmol/L 137   POTASSIUM mmol/L 5 6*   CHLORIDE mmol/L 105   CO2 mmol/L 24   BUN mg/dL 96*   CREATININE mg/dL 3 29*   ANION GAP mmol/L 8   CALCIUM mg/dL 8 5   GLUCOSE RANDOM mg/dL 145*                           * I Have Reviewed All Lab Data Listed Above  * Additional Pertinent Lab Tests Reviewed:  All Labs Within Last 24 Hours Reviewed    Imaging:    Imaging Reports Reviewed Today Include:   Imaging Personally Reviewed by Myself Includes:      Recent Cultures (last 7 days):           Last 24 Hours Medication List:   Current Facility-Administered Medications   Medication Dose Route Frequency Provider Last Rate    acetaminophen  650 mg Oral Q4H PRN [facility] Shar[facility], PA-C      albuterol  2 puff Inhalation Q4H PRN [facility], PA-C      apixaban  2 5 mg Oral BID Maggy Sánchez MD      atorvastatin  40 mg Oral Daily With [employer], DIMITRI      bisacodyl  10 mg Rectal Daily Maggy Sánchez MD      calcitriol  0 25 mcg Oral Daily [facility], PA-C      cholecalciferol  2,000 Units Oral Daily [facility] Sharifa, PA-C      citalopram  20 mg Oral Daily [facility] Shar[facility], PA-C      dexamethasone  8 mg Intravenous Q12H [address] [place] Sharifa, PA-C      famotidine  20 mg Oral Daily [place] Sharifa, PA-C      levothyroxine  75 mcg Oral Daily [place] Sharifa, PA-C      melatonin  6 mg Oral HS Maggy Sánchez MD      metoprolol tartrate  25 mg Oral BID [facility] Sharifa, PA-TRINIDAD      multivitamin-minerals  1 tablet Oral Daily [facility] Shar[facility], PA-C      ondansetron  4 mg Intravenous Q4H PRN Wilber Torres PA-C      polyethylene glycol  17 g Oral Daily Tessie Almeida MD      ruxolitinib  10 mg Oral Every Other Day Wilber Torres PA-C      saccharomyces boulardii  250 mg Oral BID Maida Carmona      senna-docusate sodium  2 tablet Oral HS Wilber Torres PA-C      sodium bicarbonate  1,300 mg Oral BID after meals Wilber Torres PA-C          Today, Patient Was Seen By: Nasima Morrison MD    ** Please Note: Dictation voice to text software may have been used in the creation of this document   **

## 2021-07-29 NOTE — PHYSICAL THERAPY NOTE
Physical Therapy Treatment Note       07/29/21 1050   PT Last Visit   PT Visit Date 07/29/21   Note Type   Note Type Treatment   Pain Assessment   Pain Assessment Tool 0-10   Pain Score 4   Pain Location/Orientation Location: Generalized   Patient's Stated Pain Goal No pain   Hospital Pain Intervention(s) Ambulation/increased activity;Repositioned   Restrictions/Precautions   Weight Bearing Precautions Per Order No   Other Precautions Contact/isolation; Airborne/isolation;Telemetry;Multiple lines;O2;Fall Risk;Pain  (pt is COVID +)   General   Chart Reviewed Yes   Family/Caregiver Present No   Cognition   Overall Cognitive Status Impaired   Arousal/Participation Responsive   Attention Attends with cues to redirect   Orientation Level Oriented X4   Memory Unable to assess   Following Commands Follows one step commands without difficulty   Subjective   Subjective cooperative w/ session- limited by pain, SOB, fatigue   Bed Mobility   Supine to Sit 4  Minimal assistance   Additional items Assist x 1; Increased time required   Additional Comments sat EOB x approx 10 min prior to transfer  some dizziness, SOB  time spent for setup, repositioning   Transfers   Sit to Stand 4  Minimal assistance   Additional items Assist x 1   Stand to Sit 4  Minimal assistance   Additional items Assist x 1   Ambulation/Elevation   Gait pattern   (slow, forward flexion short step length)   Gait Assistance 4  Minimal assist   Additional items Assist x 1   Assistive Device Rolling walker   Distance 8'x1 in room    time spent to reposition   Balance   Static Sitting Good   Dynamic Sitting Fair -   Static Standing Poor +   Dynamic Standing Poor +   Ambulatory Poor +   Endurance Deficit   Endurance Deficit Yes   Endurance Deficit Description fatigue, weakness, pain   Activity Tolerance   Activity Tolerance Patient limited by fatigue;Patient limited by pain;Treatment limited secondary to medical complications (Comment)   Nurse Made Aware yes Assessment   Prognosis Good   Problem List Decreased strength;Decreased endurance; Impaired balance;Decreased mobility;Pain   Assessment Pt seen for session for setup, bed mob, time spent EOB, transfers, minimal gait, repositioning  Pt cooperative w/ session, but limited by some hypoxia (O2 decreased to low 90s w/ gait), dizziness, weakness  continues to need increased assist for mobility tasks, and continue to recommend rehab at d/c   Goals   Patient Goals to feel better   [facility] Expiration Date 08/02/21   PT Treatment Day 3   Plan   Treatment/Interventions Functional transfer training;LE strengthening/ROM; Therapeutic exercise; Endurance training;Patient/family training;Equipment eval/education;Gait training;Bed mobility   Progress Progressing toward goals   PT Frequency Other (Comment)  (3-5x/wk)   Recommendation   PT Discharge Recommendation Post acute rehabilitation services   PT - OK to Discharge Yes  (when stable to rehab)   Malorie 8 in Bed Without Bedrails 3   Lying on Back to Sitting on Edge of Flat Bed 3   Moving Bed to Chair 3   Standing Up From Chair 3   Walk in Room 3   Climb 3-5 Stairs 2   Basic Mobility Inpatient Raw Score 17   Basic Mobility Standardized Score 39 67   Bernadine De Los Santos PT, DPT CSRS

## 2021-07-30 LAB
ANION GAP SERPL CALCULATED.3IONS-SCNC: 5 MMOL/L (ref 4–13)
BUN SERPL-MCNC: 95 MG/DL (ref 5–25)
CALCIUM SERPL-MCNC: 8.3 MG/DL (ref 8.3–10.1)
CHLORIDE SERPL-SCNC: 105 MMOL/L (ref 100–108)
CO2 SERPL-SCNC: 25 MMOL/L (ref 21–32)
CREAT SERPL-MCNC: 3.55 MG/DL (ref 0.6–1.3)
GFR SERPL CREATININE-BSD FRML MDRD: 12 ML/MIN/1.73SQ M
GLUCOSE SERPL-MCNC: 136 MG/DL (ref 65–140)
GLUCOSE SERPL-MCNC: 182 MG/DL (ref 65–140)
PHOSPHATE SERPL-MCNC: 4.6 MG/DL (ref 2.3–4.1)
POTASSIUM SERPL-SCNC: 5.2 MMOL/L (ref 3.5–5.3)
POTASSIUM SERPL-SCNC: 5.8 MMOL/L (ref 3.5–5.3)
SODIUM SERPL-SCNC: 135 MMOL/L (ref 136–145)

## 2021-07-30 PROCEDURE — 99232 SBSQ HOSP IP/OBS MODERATE 35: CPT | Performed by: INTERNAL MEDICINE

## 2021-07-30 PROCEDURE — 84132 ASSAY OF SERUM POTASSIUM: CPT | Performed by: INTERNAL MEDICINE

## 2021-07-30 PROCEDURE — 84100 ASSAY OF PHOSPHORUS: CPT | Performed by: INTERNAL MEDICINE

## 2021-07-30 PROCEDURE — 82948 REAGENT STRIP/BLOOD GLUCOSE: CPT

## 2021-07-30 PROCEDURE — 80048 BASIC METABOLIC PNL TOTAL CA: CPT | Performed by: INTERNAL MEDICINE

## 2021-07-30 RX ORDER — CALCIUM GLUCONATE 20 MG/ML
1 INJECTION, SOLUTION INTRAVENOUS ONCE
Status: COMPLETED | OUTPATIENT
Start: 2021-07-30 | End: 2021-07-31

## 2021-07-30 RX ORDER — DEXTROSE MONOHYDRATE 25 G/50ML
25 INJECTION, SOLUTION INTRAVENOUS ONCE
Status: COMPLETED | OUTPATIENT
Start: 2021-07-30 | End: 2021-07-30

## 2021-07-30 RX ADMIN — SODIUM BICARBONATE 650 MG TABLET 1300 MG: at 08:08

## 2021-07-30 RX ADMIN — CITALOPRAM HYDROBROMIDE 20 MG: 20 TABLET ORAL at 08:08

## 2021-07-30 RX ADMIN — CALCIUM GLUCONATE 1 G: 20 INJECTION, SOLUTION INTRAVENOUS at 06:39

## 2021-07-30 RX ADMIN — APIXABAN 2.5 MG: 2.5 TABLET, FILM COATED ORAL at 08:11

## 2021-07-30 RX ADMIN — INSULIN HUMAN 5 UNITS: 100 INJECTION, SOLUTION PARENTERAL at 06:42

## 2021-07-30 RX ADMIN — LEVOTHYROXINE SODIUM 75 MCG: 75 TABLET ORAL at 05:13

## 2021-07-30 RX ADMIN — Medication 250 MG: at 08:09

## 2021-07-30 RX ADMIN — FAMOTIDINE 20 MG: 20 TABLET ORAL at 08:11

## 2021-07-30 RX ADMIN — Medication 2000 UNITS: at 08:09

## 2021-07-30 RX ADMIN — DEXAMETHASONE SODIUM PHOSPHATE 8 MG: 4 INJECTION INTRA-ARTICULAR; INTRALESIONAL; INTRAMUSCULAR; INTRAVENOUS; SOFT TISSUE at 22:15

## 2021-07-30 RX ADMIN — DEXAMETHASONE SODIUM PHOSPHATE 8 MG: 4 INJECTION INTRA-ARTICULAR; INTRALESIONAL; INTRAMUSCULAR; INTRAVENOUS; SOFT TISSUE at 08:17

## 2021-07-30 RX ADMIN — SODIUM BICARBONATE 650 MG TABLET 1300 MG: at 16:46

## 2021-07-30 RX ADMIN — METOPROLOL TARTRATE 25 MG: 25 TABLET, FILM COATED ORAL at 08:09

## 2021-07-30 RX ADMIN — MULTIPLE VITAMINS W/ MINERALS TAB 1 TABLET: TAB ORAL at 08:08

## 2021-07-30 RX ADMIN — BISACODYL 10 MG: 10 SUPPOSITORY RECTAL at 16:47

## 2021-07-30 RX ADMIN — DEXTROSE MONOHYDRATE 25 ML: 500 INJECTION PARENTERAL at 06:39

## 2021-07-30 RX ADMIN — MELATONIN TAB 3 MG 6 MG: 3 TAB at 22:15

## 2021-07-30 RX ADMIN — METOPROLOL TARTRATE 25 MG: 25 TABLET, FILM COATED ORAL at 17:00

## 2021-07-30 RX ADMIN — ATORVASTATIN CALCIUM 40 MG: 40 TABLET, FILM COATED ORAL at 17:00

## 2021-07-30 RX ADMIN — SENNOSIDES AND DOCUSATE SODIUM 2 TABLET: 8.6; 5 TABLET ORAL at 22:15

## 2021-07-30 RX ADMIN — RUXOLITINIB 10 MG: 10 TABLET ORAL at 08:08

## 2021-07-30 RX ADMIN — APIXABAN 2.5 MG: 2.5 TABLET, FILM COATED ORAL at 17:01

## 2021-07-30 RX ADMIN — CALCITRIOL CAPSULES 0.25 MCG 0.25 MCG: 0.25 CAPSULE ORAL at 08:08

## 2021-07-30 NOTE — ASSESSMENT & PLAN NOTE
· In the setting of COVID-19 as above  · Plan as per above  · Now saturating well on 2 L; much improved

## 2021-07-30 NOTE — UTILIZATION REVIEW
Continued Stay Review    Date: 7/30                         Current Patient Class: Inpatient Current Level of Care: Med Surg    HPI:76 y o  female initially admitted on 7/15    Assessment/Plan:   Per Nephrology; Renal function improved with a creat of 3 55 today  No urgent dialysis needed  F/u daily labs and monitor I&Os  Constipation improving with bowel regiment  K of 5 8 today  Continues on Iv Steriod  O2 requirement decreasing, on 2L NC today       Vital Signs:   07/30/21 0809  --  56  --  152/80  --  --  --  --  --  --  --  --   07/30/21 0800  98 °F (36 7 °C)  64  17  149/67  87  97 %  --  28  --  2 L/min  Nasal cannula         Pertinent Labs/Diagnostic Results:       Results from last 7 days   Lab Units 07/29/21  0607 07/28/21  0545 07/27/21  0601 07/26/21  0602 07/25/21  0425   WBC Thousand/uL 8 85 10 45* 9 73 8 30 6 01   HEMOGLOBIN g/dL 8 0* 8 7* 8 7* 8 3* 7 2*   HEMATOCRIT % 25 8* 26 8* 26 7* 24 9* 22 4*   PLATELETS Thousands/uL 261 301 325 293 246         Results from last 7 days   Lab Units 07/30/21  0507 07/29/21  0442 07/28/21  0545 07/27/21  0601 07/26/21  0602   SODIUM mmol/L 135* 137 136 138 136   POTASSIUM mmol/L 5 8* 5 6* 4 9 5 0 4 5   CHLORIDE mmol/L 105 105 104 102 100   CO2 mmol/L 25 24 25 26 26   ANION GAP mmol/L 5 8 7 10 10   BUN mg/dL 95* 96* 89* 90* 81*   CREATININE mg/dL 3 55* 3 29* 3 26* 3 42* 3 63*   EGFR ml/min/1 73sq m 12 13 13 12 12   CALCIUM mg/dL 8 3 8 5 8 4 7 9* 8 1*   PHOSPHORUS mg/dL 4 6*  --   --   --   --          Results from last 7 days   Lab Units 07/30/21  0710   POC GLUCOSE mg/dl 182*     Results from last 7 days   Lab Units 07/30/21  0507 07/29/21  0442 07/28/21  0545 07/27/21  0601 07/26/21  0602 07/25/21  0425 07/24/21  0521   GLUCOSE RANDOM mg/dL 136 145* 113 129 132 127 114       Results from last 7 days   Lab Units 07/28/21  0545 07/27/21  0601 07/24/21  0521   D-DIMER QUANTITATIVE ug/ml FEU 1 07* 1 45* 3 80*     Results from last 7 days   Lab Units 07/28/21  0487 07/28/21  0108 07/27/21  1821   PTT seconds 41* 121* 44*     Medications:   Scheduled Medications:  apixaban, 2 5 mg, Oral, BID  atorvastatin, 40 mg, Oral, Daily With Dinner  bisacodyl, 10 mg, Rectal, Daily  calcitriol, 0 25 mcg, Oral, Daily  cholecalciferol, 2,000 Units, Oral, Daily  citalopram, 20 mg, Oral, Daily  dexamethasone, 8 mg, Intravenous, Q12H LONG  famotidine, 20 mg, Oral, Daily  levothyroxine, 75 mcg, Oral, Daily  melatonin, 6 mg, Oral, HS  metoprolol tartrate, 25 mg, Oral, BID  multivitamin-minerals, 1 tablet, Oral, Daily  polyethylene glycol, 17 g, Oral, Daily  ruxolitinib, 10 mg, Oral, Every Other Day  saccharomyces boulardii, 250 mg, Oral, BID  senna-docusate sodium, 2 tablet, Oral, HS  sodium bicarbonate, 1,300 mg, Oral, BID after meals      Continuous IV Infusions: None     PRN Meds:  acetaminophen, 650 mg, Oral, Q4H PRN  albuterol, 2 puff, Inhalation, Q4H PRN  ondansetron, 4 mg, Intravenous, Q4H PRN        Discharge Plan: D    Network Utilization Review Department  ATTENTION: Please call with any questions or concerns to [phone] and carefully listen to the prompts so that you are directed to the right person  All voicemails are confidential   Vijay Caprice all requests for admission clinical reviews, approved or denied determinations and any other requests to dedicated fax number below belonging to the campus where the patient is receiving treatment   List of dedicated fax numbers for the Facilities:  [address] DENIALS (Administrative/Medical Necessity) [phone]   [address] (Maternity/NICU/Pediatrics) [phone]   [address] [place] [address] Dr Neto [address]ancock[phone] [place] [address] [address] [place] [address] [address] [phone]

## 2021-07-30 NOTE — PROGRESS NOTES
Pastoral Care Progress Note    2021  Patient: Britany Shahid :   Admission Date & Time: 7/15/2021 1052  MRN: [id number] [facility]: [id number]                     Chaplaincy Interventions Utilized:   Empowerment: Clarified, confirmed, or reviewed information from treatment team  and Provided grief counseling    Exploration: Explored alternatives, Explored emotional needs & resources, Explored spiritual needs & resources and Facilitated story telling        Relationship Building: Cultivated a relationship of care and support and Listened empathically    Ritual: Provided prayer            Chaplaincy Outcomes Achieved:  Expressed gratitude          Spiritual Coping Strategies Utilized:   Connectedness       21 1700   Clinical Encounter Type   Visited With Patient   Routine Visit Follow-up   Crisis Visit Critical Care   Referral From [address] Needs Prayer   Patient Spiritual Encounters   Spiritual Encounter Notes Asked to provide support due to the death of the patient's son  Comforted, esplored life stories, oprovided prayer

## 2021-07-30 NOTE — PROGRESS NOTES
responded to family request for  support for Pura Plan for when her children shared of their sibling's rapid decline from [facility]  Clinical care coordinator, LCSW and  determined that Pura Plan is POA for her son who is also in critical care at [facility]  Daughter Kvng Parsons informed [place] Plan by phone that her autistic son, for whom she has been primary caregiver is nearing the point of withdrawal of care  Si[place] Plan was connected with her son Doug's critical care doctor and told she has until 7 31 21 to make choice of care (this doctor's phone number is  Her other children now are aware that it must legally be [place] Plan to make and communicate this choice   prayed with [place] Plan and help facilitate initial reception of news, feelings of guilt and grief

## 2021-07-30 NOTE — PROGRESS NOTES
[address] [facility]  Progress Note - Cupertino Larger [birth year], 77 y o  female MRN: [id number]  Unit/Bed#: MICU 14 Encounter: [id number]  Primary Care Provider: Grace Sawyer MD   Date and time admitted to hospital: 7/15/2021 10:52 AM    Edema of right upper extremity  Assessment & Plan  Chronic as + AVF in place but more increased than usual as also confirmed per nephrology  Check upper extremity doppler-negative  Already on heparin gtt  Improving    Acute respiratory failure with hypoxia ([address] )  Assessment & Plan  · In the setting of COVID-19 as above  · Plan as per above  · Now saturating well on 2 L; much improved    Constipation  Assessment & Plan  Patient is having constipation, due probably to immobility and medications  Continue with senna and colace, miralax and dulcolax suppository was ordered  Monitor bm     Class 2 severe obesity due to excess calories with serious comorbidity and body mass index (BMI) of 35 0 to 35 9 in adult [facility])  Assessment & Plan  Body mass index is 37 89 kg/m²  · Nutrition consult once improved from above    History of Clostridioides difficile colitis  Assessment & Plan  · Diarrhea likely secondary to covid 19 infection; resolved  · Noted history of C  Diff  · C  Diff testing was negative on admission, however patient did received IV abx   Received PO Vanco for prophylaxis for 3 days post last dose of IV abx    Acute renal failure superimposed on stage 5 chronic kidney disease, not on chronic dialysis [facility])  Assessment & Plan  Lab Results   Component Value Date    EGFR 12 07/30/2021    EGFR 13 07/29/2021    EGFR 13 07/28/2021    CREATININE 3 55 (H) 07/30/2021    CREATININE 3 29 (H) 07/29/2021    CREATININE 3 26 (H) 07/28/2021   · on CKD stg V, secondary to ATN due to covid infection  · Secondary to sepCr baseline 3 4-3 8, follows with Dr Sandy Ford  · Nephrology following   · Prn dosing of IV lasix per fluid status monitoring  · Has R AVF in place: dopplers, no issue edema noted  · No indication for HD at this time  · On bicarb tablets, calcitrol      Anemia in CKD (chronic kidney disease)  Assessment & Plan  Lab Results   Component Value Date    HGB 8 0 (L) 07/29/2021    HGB 8 7 (L) 07/28/2021    HGB 8 7 (L) 07/27/2021   · Chronic, no sign of acute blood loss  · Cont to monitor, transfuse if < 7     Polycythemia vera ([facility] [address] )  Assessment & Plan  · Patient follows up with outpatient Hematology Oncology, currently on Jakafi     Obstructive uropathy  Assessment & Plan  · S/p ileostomy for nonfunctioning bladder and chronic hydro  · OP urology follow up    Chronic saddle pulmonary embolism ([facility] [address] )  Assessment & Plan  · On apixaban 2 5 mg b i d ; overall respiratory status improving      * COVID-19 virus infection  Assessment & Plan  · Patient presented with fever, generalized weakness, diarrhea decreasing oral intake and difficulty with ambulation for 1 week per admission record review  · COVID-19 pneumonia, unfortunately unvaccinated  · S/p completion of remdesivir x 5 days  · On increased dose of dexamethasone 8mg bid, on gi prophylaxis   · Abx dcd given low procal x 2  · Worsening d-dimer thus recommended per pulmonary to d/c eliquis and start on heparin gtt: D dimer is improving, trending still going down tomorrow, will place her back to eliquis  · Still on 15 liters mid flow   · Now on IV lasix 60mg every other day  · Cont to trend d-dimer  · Remains on stepdown - low threshold to involve critical care   · Respiratory protocol     7/29-overall respiratory status improving; continue present therapy  Currently on 6 L nasal cannula  Patient followed by Nephrology and renal function stable  Started low potassium diet; step-down to med surge  Continue isolation for additional 5 days  7/30-respiratory status improved significantly, currently on 2 L nasal cannula  Renal function, however, not improving as anticipated  Appreciate Nephrology recommendations    Low potassium diet; no need for dialysis  Will continue to monitor on morning labs and potassium lab tonight at 6:00 p m  VTE Pharmacologic Prophylaxis:   Pharmacologic: Apixaban (Eliquis)  Mechanical VTE Prophylaxis in Place: Yes    Patient Centered Rounds: I have performed bedside rounds with nursing staff today  Discussions with Specialists or Other Care Team Provider:  nephro      Education and Discussions with Family / Patient: Discussed treatment plan with family and patient who agree with current plan; encouraged to ask questions and participate  Time Spent for Care: 30 minutes  More than 50% of total time spent on counseling and coordination of care as described above  Current Length of Stay: 15 day(s)    Current Patient Status: Inpatient   Certification Statement: The patient will continue to require additional inpatient hospital stay due to Treatment of COVID pneumonia    Discharge Plan: To be determined    Code Status: Level 1 - Full Code      Subjective:   Patient seen examined bedside, very sad case, as patient's son, who also had COVID, passed today  Appreciate pastoral care being there for the patient  In terms of patient's care plan, respiratory status greatly improved and now on 2 L  Renal status not as improved; but urinary output remains stable and will repeat potassium labs  Monitor overnight and provide emotional and spiritual support for patient during this difficult time  Objective:     Vitals:   Temp (24hrs), Av 7 °F (36 5 °C), Min:97 5 °F (36 4 °C), Max:98 °F (36 7 °C)    Temp:  [97 5 °F (36 4 °C)-98 °F (36 7 °C)] 97 5 °F (36 4 °C)  HR:  [54-74] 60  Resp:  [11-20] 14  BP: (128-152)/(63-80) 141/74  SpO2:  [97 %-99 %] 99 %  Body mass index is 37 89 kg/m²  Input and Output Summary (last 24 hours):        Intake/Output Summary (Last 24 hours) at 2021 1742  Last data filed at 2021 1545  Gross per 24 hour   Intake 50 ml   Output 1675 ml   Net -1625 ml Physical Exam:     Physical Exam  Vitals and nursing note reviewed  Constitutional:       General: She is not in acute distress  Appearance: She is well-developed  HENT:      Head: Normocephalic and atraumatic  Eyes:      Conjunctiva/sclera: Conjunctivae normal    Cardiovascular:      Rate and Rhythm: Normal rate and regular rhythm  Heart sounds: No murmur heard  Pulmonary:      Effort: Pulmonary effort is normal  No respiratory distress  Comments: Reduced breath sounds  Abdominal:      Palpations: Abdomen is soft  Tenderness: There is no abdominal tenderness  Musculoskeletal:      Cervical back: Neck supple  Comments: Edema right> left upper extremity   Skin:     General: Skin is warm and dry  Neurological:      Mental Status: She is alert and oriented to person, place, and time  Psychiatric:      Comments: Depressed mood; tearful           Additional Data:     Labs:    Results from last 7 days   Lab Units 07/29/21  0607 07/28/21  0545   WBC Thousand/uL 8 85 10 45*   HEMOGLOBIN g/dL 8 0* 8 7*   HEMATOCRIT % 25 8* 26 8*   PLATELETS Thousands/uL 261 301   LYMPHO PCT %  --  2*   MONO PCT %  --  2*   EOS PCT %  --  0     Results from last 7 days   Lab Units 07/30/21  0507   SODIUM mmol/L 135*   POTASSIUM mmol/L 5 8*   CHLORIDE mmol/L 105   CO2 mmol/L 25   BUN mg/dL 95*   CREATININE mg/dL 3 55*   ANION GAP mmol/L 5   CALCIUM mg/dL 8 3   GLUCOSE RANDOM mg/dL 136         Results from last 7 days   Lab Units 07/30/21  0710   POC GLUCOSE mg/dl 182*                   * I Have Reviewed All Lab Data Listed Above  * Additional Pertinent Lab Tests Reviewed:  All Labs Within Last 24 Hours Reviewed    Imaging:    Imaging Reports Reviewed Today Include:   Imaging Personally Reviewed by Myself Includes:      Recent Cultures (last 7 days):           Last 24 Hours Medication List:   Current Facility-Administered Medications   Medication Dose Route Frequency Provider Last Rate    acetaminophen  650 mg Oral Q4H PRN Felecia Herb, PA-TRINIDAD      albuterol  2 puff Inhalation Q4H PRN Felecia Herb, DIMITRI      apixaban  2 5 mg Oral BID Moose Roger MD      atorvastatin  40 mg Oral Daily With [employer], DIMITRI      bisacodyl  10 mg Rectal Daily Moose Roger MD      calcitriol  0 25 mcg Oral Daily Felecia Herb, PA-TRINIDAD      cholecalciferol  2,000 Units Oral Daily Felecia Herb, DIMITRI      citalopram  20 mg Oral Daily Felecia Herb, DIMITRI      dexamethasone  8 mg Intravenous Q12H [address] Felecia Herb, DIMITRI      famotidine  20 mg Oral Daily Felecia Herb, DIMITRI      levothyroxine  75 mcg Oral Daily Felecia Herb, [place]      melatonin  6 mg Oral HS Moose Roger MD      metoprolol tartrate  25 mg Oral BID Felecia Herb, PA-TRINIDAD      multivitamin-minerals  1 tablet Oral Daily Felecia Herb, PA-TRINIDAD      ondansetron  4 mg Intravenous Q4H PRN Felecia Herb, DIMITRI      polyethylene glycol  17 g Oral Daily Moose Roger MD      ruxolitinib  10 mg Oral Every Other Day Felecia Herb, PA-TRINIDAD      saccharomyces boulardii  250 mg Oral BID Felecia Herb, PA-TRINIDAD      senna-docusate sodium  2 tablet Oral HS Felecia Herb, PA-TRINIDAD      sodium bicarbonate  1,300 mg Oral BID after meals Felecia Herb, DIMITRI          Today, Patient Was Seen By: Amara Miguel MD    ** Please Note: Dictation voice to text software may have been used in the creation of this document   **

## 2021-07-30 NOTE — PROGRESS NOTES
NEPHROLOGY PROGRESS NOTE   Eliu Khanna 77 y o  female MRN: [id number]  Unit/Bed#: MICU 14 Encounter: [id number]      ASSESSMENT & PLAN:  1  Acute kidney injury on top of Chronic kidney disease stage 5  Baseline creatinine in the low to mid threes  Creatinine peak at 4 9 on 7/15, renal function improved with a creatinine of 3 55 today  No urgent indication for dialysis  Follow daily labs and monitor ins and outs  Avoid relative hypotension    2  COVID-19 virus infection, management per Medicine team    3  Acute respiratory failure with hypoxemia in the setting of #2  Okay to use diuretics p r n  If hypoxemia worsens  Oxygen requirement decreasing, on 2 L oxygen now    4  Hemodynamics, blood pressure stable, avoid relative hypotension    5  Anemia multifactorial in setting of CKD, noted patient with history of polycythemia vera, monitor H&H and transfusion as needed    6  Right upper extremity AV fistula in place, right upper extremity edema, right upper extremity Doppler did not show any DVT and fistula is patent    7  Constipation, improving with bowel regimen    8  Mild hyperkalemia, diet avoid changed to low-sodium low-potassium yesterday, received medical treatment today, follow repeat labs    My plan and recommendations were discussed with Internal Medicine team via [facility] text      SUBJECTIVE:  Patient seen, lying in bed, feeling tired but in general denies any other complaints  No chest pain or shortness of breath, no abdominal pain, no nausea, no vomiting    Oxygen requirement decreased to 2 L    OBJECTIVE:  Current Weight: Weight - Scale: 88 kg (194 lb 0 1 oz)  Vitals:    07/30/21 0809   BP: 152/80   Pulse: 56   Resp:    Temp:    SpO2:        Intake/Output Summary (Last 24 hours) at 7/30/2021 0916  Last data filed at 7/30/2021 0501  Gross per 24 hour   Intake --   Output 1175 ml   Net -1175 ml     General:  Sitting in bed, in not acute distress  Eyes: conjunctivae pink, anicteric sclerae, no periorbital edema  ENT: lips and mucous membranes moist, oxygen nasal cannula  Neck: supple, no JVD  Chest:  No increased work of breathing  CVS:  Telemetry reviewed  Abdomen:  Nondistended  Extremities: no edema of both legs, left upper extremity edema positive  Skin: no visible rash  Neuro: awake, alert, oriented        Medications:    Current Facility-Administered Medications:     acetaminophen (TYLENOL) tablet 650 mg, 650 mg, Oral, Q4H PRN, Houston Henok, PA-C    albuterol (PROVENTIL HFA,VENTOLIN HFA) inhaler 2 puff, 2 puff, Inhalation, Q4H PRN, Houston Henok, PA-C    apixaban (ELIQUIS) tablet 2 5 mg, 2 5 mg, Oral, BID, Abhijit Scott MD, 2 5 mg at 07/30/21 9110    atorvastatin (LIPITOR) tablet 40 mg, 40 mg, Oral, Daily With Dinner, Houston Henok, PA-C, 40 mg at 07/29/21 1749    bisacodyl (DULCOLAX) rectal suppository 10 mg, 10 mg, Rectal, Daily, Abhijit Scott MD, 10 mg at 07/28/21 1334    calcitriol (ROCALTROL) capsule 0 25 mcg, 0 25 mcg, Oral, Daily, Houston Henok, PA-C, 0 25 mcg at 07/30/21 4014    cholecalciferol (VITAMIN D3) tablet 2,000 Units, 2,000 Units, Oral, Daily, Houston Henok, PA-C, 2,000 Units at 07/30/21 0809    citalopram (CeleXA) tablet 20 mg, 20 mg, Oral, Daily, Houston Henok, PA-C, 20 mg at 07/30/21 0808    dexamethasone (DECADRON) injection 8 mg, 8 mg, Intravenous, Q12H [address], Houston Henok, PA-C, 8 mg at 07/30/21 0817    famotidine (PEPCID) tablet 20 mg, 20 mg, Oral, Daily, Houston Henok, PA-C, 20 mg at 07/30/21 8827    levothyroxine tablet 75 mcg, 75 mcg, Oral, Daily, Houston Henok, PA-C, 75 mcg at 07/30/21 0513    melatonin tablet 6 mg, 6 mg, Oral, HS, Abhijit Scott MD, 6 mg at 07/29/21 2200    metoprolol tartrate (LOPRESSOR) tablet 25 mg, 25 mg, Oral, BID, Michelle Funes PA-C, 25 mg at 07/30/21 0809    [COMPLETED] zinc sulfate (ZINCATE) capsule 220 mg, 220 mg, Oral, Daily, 220 mg at 07/21/21 0859 **FOLLOWED BY** multivitamin-minerals (CENTRUM ADULTS) tablet 1 tablet, 1 tablet, Oral, Daily, Agus Baba, PA-C, 1 tablet at 07/30/21 0808    [facility]etron [facility]) injection 4 mg, 4 mg, Intravenous, Q4H PRN, Agus Baba, PA-C, 4 mg at 07/17/21 0845    polyethylene glycol (MIRALAX) packet 17 g, 17 g, Oral, Daily, Gene Araiza MD, 17 g at 07/28/21 0949    ruxolitinib (JAKAFI) tablet 10 mg, 10 mg, Oral, Every Other Day, Agus Baba, PA-C, 10 mg at 07/30/21 3452    saccharomyces boulardii (FLORASTOR) capsule 250 mg, 250 mg, Oral, BID, Agus Baba, PA-C, 250 mg at 07/30/21 0809    senna-docusate sodium (SENOKOT S) 8 6-50 mg per tablet 2 tablet, 2 tablet, Oral, HS, Agus Baba, PA-C, 2 tablet at 07/29/21 2200    sodium bicarbonate tablet 1,300 mg, 1,300 mg, Oral, BID after meals, Agus Baba, PA-C, 1,300 mg at 07/30/21 3421    Invasive Devices:        Lab Results:   Results from last 7 days   Lab Units 07/30/21  0507 07/29/21  0607 07/29/21  0442 07/28/21  0545 07/27/21  0601   WBC Thousand/uL  --  8 85  --  10 45* 9 73   HEMOGLOBIN g/dL  --  8 0*  --  8 7* 8 7*   HEMATOCRIT %  --  25 8*  --  26 8* 26 7*   PLATELETS Thousands/uL  --  261  --  301 325   SODIUM mmol/L 135*  --  137 136 138   POTASSIUM mmol/L 5 8*  --  5 6* 4 9 5 0   CHLORIDE mmol/L 105  --  105 104 102   CO2 mmol/L 25  --  24 25 26   BUN mg/dL 95*  --  96* 89* 90*   CREATININE mg/dL 3 55*  --  3 29* 3 26* 3 42*   CALCIUM mg/dL 8 3  --  8 5 8 4 7 9*   PHOSPHORUS mg/dL 4 6*  --   --   --   --          Portions of the record may have been created with voice recognition software  Occasional wrong word or "sound a like" substitutions may have occurred due to the inherent limitations of voice recognition software  Read the chart carefully and recognize, using context, where substitutions have occurred  If you have any questions, please contact the dictating provider

## 2021-07-30 NOTE — ASSESSMENT & PLAN NOTE
Lab Results   Component Value Date    EGFR 12 07/30/2021    EGFR 13 07/29/2021    EGFR 13 07/28/2021    CREATININE 3 55 (H) 07/30/2021    CREATININE 3 29 (H) 07/29/2021    CREATININE 3 26 (H) 07/28/2021   · on CKD stg V, secondary to ATN due to covid infection  · Secondary to sepCr baseline 3 4-3 8, follows with Dr Lorene Haines  · Nephrology following   · Prn dosing of IV lasix per fluid status monitoring  · Has R AVF in place: dopplers, no issue edema noted  · No indication for HD at this time  · On bicarb tablets, calcitrol

## 2021-07-30 NOTE — ASSESSMENT & PLAN NOTE
· Patient presented with fever, generalized weakness, diarrhea decreasing oral intake and difficulty with ambulation for 1 week per admission record review  · COVID-19 pneumonia, unfortunately unvaccinated  · S/p completion of remdesivir x 5 days  · On increased dose of dexamethasone 8mg bid, on gi prophylaxis   · Abx dcd given low procal x 2  · Worsening d-dimer thus recommended per pulmonary to d/c eliquis and start on heparin gtt: D dimer is improving, trending still going down tomorrow, will place her back to eliquis  · Still on 15 liters mid flow   · Now on IV lasix 60mg every other day  · Cont to trend d-dimer  · Remains on stepdown - low threshold to involve critical care   · Respiratory protocol     7/29-overall respiratory status improving; continue present therapy  Currently on 6 L nasal cannula  Patient followed by Nephrology and renal function stable  Started low potassium diet; step-down to med surge  Continue isolation for additional 5 days  7/30-respiratory status improved significantly, currently on 2 L nasal cannula  Renal function, however, not improving as anticipated  Appreciate Nephrology recommendations  Low potassium diet; no need for dialysis  Will continue to monitor on morning labs and potassium lab tonight at 6:00 p m  France Rosas

## 2021-07-30 NOTE — ASSESSMENT & PLAN NOTE
Chronic as + AVF in place but more increased than usual as also confirmed per nephrology  Check upper extremity doppler-negative  Already on heparin gtt  Improving

## 2021-07-30 NOTE — ASSESSMENT & PLAN NOTE
· Patient follows up with outpatient Hematology Oncology, currently on [facility]

## 2021-07-31 PROBLEM — E87.5 HYPERKALEMIA: Status: ACTIVE | Noted: 2021-07-31

## 2021-07-31 LAB
ALBUMIN SERPL BCP-MCNC: 2.3 G/DL (ref 3.5–5)
ALP SERPL-CCNC: 55 U/L (ref 46–116)
ALT SERPL W P-5'-P-CCNC: 14 U/L (ref 12–78)
ANION GAP SERPL CALCULATED.3IONS-SCNC: 8 MMOL/L (ref 4–13)
AST SERPL W P-5'-P-CCNC: 16 U/L (ref 5–45)
BASOPHILS # BLD AUTO: 0.01 THOUSANDS/ΜL (ref 0–0.1)
BASOPHILS NFR BLD AUTO: 0 % (ref 0–1)
BILIRUB SERPL-MCNC: 0.38 MG/DL (ref 0.2–1)
BUN SERPL-MCNC: 103 MG/DL (ref 5–25)
CALCIUM ALBUM COR SERPL-MCNC: 9.8 MG/DL (ref 8.3–10.1)
CALCIUM SERPL-MCNC: 8.4 MG/DL (ref 8.3–10.1)
CHLORIDE SERPL-SCNC: 105 MMOL/L (ref 100–108)
CO2 SERPL-SCNC: 24 MMOL/L (ref 21–32)
CREAT SERPL-MCNC: 3.32 MG/DL (ref 0.6–1.3)
EOSINOPHIL # BLD AUTO: 0 THOUSAND/ΜL (ref 0–0.61)
EOSINOPHIL NFR BLD AUTO: 0 % (ref 0–6)
ERYTHROCYTE [DISTWIDTH] IN BLOOD BY AUTOMATED COUNT: 16.2 % (ref 11.6–15.1)
GFR SERPL CREATININE-BSD FRML MDRD: 13 ML/MIN/1.73SQ M
GLUCOSE SERPL-MCNC: 121 MG/DL (ref 65–140)
HCT VFR BLD AUTO: 26.2 % (ref 34.8–46.1)
HGB BLD-MCNC: 8.3 G/DL (ref 11.5–15.4)
IMM GRANULOCYTES # BLD AUTO: 0.15 THOUSAND/UL (ref 0–0.2)
IMM GRANULOCYTES NFR BLD AUTO: 2 % (ref 0–2)
LYMPHOCYTES # BLD AUTO: 0.5 THOUSANDS/ΜL (ref 0.6–4.47)
LYMPHOCYTES NFR BLD AUTO: 6 % (ref 14–44)
MAGNESIUM SERPL-MCNC: 2.3 MG/DL (ref 1.6–2.6)
MCH RBC QN AUTO: 29.6 PG (ref 26.8–34.3)
MCHC RBC AUTO-ENTMCNC: 31.7 G/DL (ref 31.4–37.4)
MCV RBC AUTO: 94 FL (ref 82–98)
MONOCYTES # BLD AUTO: 0.33 THOUSAND/ΜL (ref 0.17–1.22)
MONOCYTES NFR BLD AUTO: 4 % (ref 4–12)
NEUTROPHILS # BLD AUTO: 7.49 THOUSANDS/ΜL (ref 1.85–7.62)
NEUTS SEG NFR BLD AUTO: 88 % (ref 43–75)
NRBC BLD AUTO-RTO: 0 /100 WBCS
PHOSPHATE SERPL-MCNC: 5.8 MG/DL (ref 2.3–4.1)
PLATELET # BLD AUTO: 233 THOUSANDS/UL (ref 149–390)
PMV BLD AUTO: 10.1 FL (ref 8.9–12.7)
POTASSIUM SERPL-SCNC: 5.1 MMOL/L (ref 3.5–5.3)
POTASSIUM SERPL-SCNC: 5.8 MMOL/L (ref 3.5–5.3)
PROT SERPL-MCNC: 5.3 G/DL (ref 6.4–8.2)
RBC # BLD AUTO: 2.8 MILLION/UL (ref 3.81–5.12)
SODIUM SERPL-SCNC: 137 MMOL/L (ref 136–145)
WBC # BLD AUTO: 8.48 THOUSAND/UL (ref 4.31–10.16)

## 2021-07-31 PROCEDURE — 83735 ASSAY OF MAGNESIUM: CPT | Performed by: INTERNAL MEDICINE

## 2021-07-31 PROCEDURE — 85025 COMPLETE CBC W/AUTO DIFF WBC: CPT | Performed by: INTERNAL MEDICINE

## 2021-07-31 PROCEDURE — 84100 ASSAY OF PHOSPHORUS: CPT | Performed by: INTERNAL MEDICINE

## 2021-07-31 PROCEDURE — 99232 SBSQ HOSP IP/OBS MODERATE 35: CPT | Performed by: INTERNAL MEDICINE

## 2021-07-31 PROCEDURE — 84132 ASSAY OF SERUM POTASSIUM: CPT | Performed by: INTERNAL MEDICINE

## 2021-07-31 PROCEDURE — 80053 COMPREHEN METABOLIC PANEL: CPT | Performed by: INTERNAL MEDICINE

## 2021-07-31 RX ORDER — SODIUM POLYSTYRENE SULFONATE 4.1 MEQ/G
15 POWDER, FOR SUSPENSION ORAL; RECTAL ONCE
Status: COMPLETED | OUTPATIENT
Start: 2021-07-31 | End: 2021-07-31

## 2021-07-31 RX ORDER — DEXAMETHASONE SODIUM PHOSPHATE 4 MG/ML
4 INJECTION, SOLUTION INTRA-ARTICULAR; INTRALESIONAL; INTRAMUSCULAR; INTRAVENOUS; SOFT TISSUE EVERY 12 HOURS SCHEDULED
Status: DISCONTINUED | OUTPATIENT
Start: 2021-07-31 | End: 2021-08-02

## 2021-07-31 RX ADMIN — APIXABAN 2.5 MG: 2.5 TABLET, FILM COATED ORAL at 09:58

## 2021-07-31 RX ADMIN — Medication 2000 UNITS: at 09:58

## 2021-07-31 RX ADMIN — ATORVASTATIN CALCIUM 40 MG: 40 TABLET, FILM COATED ORAL at 17:35

## 2021-07-31 RX ADMIN — CITALOPRAM HYDROBROMIDE 20 MG: 20 TABLET ORAL at 09:58

## 2021-07-31 RX ADMIN — POLYETHYLENE GLYCOL 3350 17 G: 17 POWDER, FOR SOLUTION ORAL at 09:57

## 2021-07-31 RX ADMIN — MELATONIN TAB 3 MG 6 MG: 3 TAB at 21:26

## 2021-07-31 RX ADMIN — DEXAMETHASONE SODIUM PHOSPHATE 4 MG: 4 INJECTION INTRA-ARTICULAR; INTRALESIONAL; INTRAMUSCULAR; INTRAVENOUS; SOFT TISSUE at 21:27

## 2021-07-31 RX ADMIN — APIXABAN 2.5 MG: 2.5 TABLET, FILM COATED ORAL at 17:35

## 2021-07-31 RX ADMIN — METOPROLOL TARTRATE 25 MG: 25 TABLET, FILM COATED ORAL at 17:35

## 2021-07-31 RX ADMIN — SODIUM BICARBONATE 650 MG TABLET 1300 MG: at 09:59

## 2021-07-31 RX ADMIN — MULTIPLE VITAMINS W/ MINERALS TAB 1 TABLET: TAB ORAL at 09:58

## 2021-07-31 RX ADMIN — CALCITRIOL CAPSULES 0.25 MCG 0.25 MCG: 0.25 CAPSULE ORAL at 10:05

## 2021-07-31 RX ADMIN — LEVOTHYROXINE SODIUM 75 MCG: 75 TABLET ORAL at 06:24

## 2021-07-31 RX ADMIN — SODIUM POLYSTYRENE SULFONATE 15 G: 4.1 POWDER, FOR SUSPENSION ORAL; RECTAL at 09:57

## 2021-07-31 RX ADMIN — DEXAMETHASONE SODIUM PHOSPHATE 8 MG: 4 INJECTION INTRA-ARTICULAR; INTRALESIONAL; INTRAMUSCULAR; INTRAVENOUS; SOFT TISSUE at 09:57

## 2021-07-31 RX ADMIN — Medication 250 MG: at 10:05

## 2021-07-31 RX ADMIN — SODIUM BICARBONATE 650 MG TABLET 1300 MG: at 17:35

## 2021-07-31 RX ADMIN — Medication 250 MG: at 17:35

## 2021-07-31 RX ADMIN — FAMOTIDINE 20 MG: 20 TABLET ORAL at 09:58

## 2021-07-31 RX ADMIN — METOPROLOL TARTRATE 25 MG: 25 TABLET, FILM COATED ORAL at 09:58

## 2021-07-31 NOTE — PROGRESS NOTES
[facility]  Progress Note - Angie Bill [birth year], 77 y o  female MRN: [id number]  Unit/Bed#: MICU 14 Encounter: [id number]  Primary Care Provider: Hunter Bo MD   Date and time admitted to hospital: 7/15/2021 10:52 AM    Hyperkalemia  Assessment & Plan  Continue to monitor; low-potassium diet  Started on Kayexalate on 07/31; asymptomatic    Edema of right upper extremity  Assessment & Plan  Chronic as + AVF in place but more increased than usual as also confirmed per nephrology  Check upper extremity doppler-negative  Already on heparin gtt  Improving    Acute respiratory failure with hypoxia ([address] )  Assessment & Plan  · In the setting of COVID-19 as above  · Plan as per above  · Now saturating well on 2 L; much improved    Constipation  Assessment & Plan  Patient is having constipation, due probably to immobility and medications  Continue with senna and colace, miralax and dulcolax suppository was ordered  Monitor bm     Class 2 severe obesity due to excess calories with serious comorbidity and body mass index (BMI) of 35 0 to 35 9 in adult [facility])  Assessment & Plan  Body mass index is 37 89 kg/m²  · Nutrition consult once improved from above    History of Clostridioides difficile colitis  Assessment & Plan  · Diarrhea likely secondary to covid 19 infection; resolved  · Noted history of C  Diff  · C  Diff testing was negative on admission, however patient did received IV abx   Received PO Vanco for prophylaxis for 3 days post last dose of IV abx    Acute renal failure superimposed on stage 5 chronic kidney disease, not on chronic dialysis [facility])  Assessment & Plan  Lab Results   Component Value Date    EGFR 13 07/31/2021    EGFR 12 07/30/2021    EGFR 13 07/29/2021    CREATININE 3 32 (H) 07/31/2021    CREATININE 3 55 (H) 07/30/2021    CREATININE 3 29 (H) 07/29/2021   · on CKD stg V, secondary to ATN due to covid infection  · Secondary to sepCr baseline 3 4-3 8, follows with Dr Kali Ray  · Nephrology following   · Prn dosing of IV lasix per fluid status monitoring  · Has R AVF in place: dopplers, no issue edema noted  · No indication for HD at this time  · On bicarb tablets, calcitrol      Anemia in CKD (chronic kidney disease)  Assessment & Plan  Lab Results   Component Value Date    HGB 8 3 (L) 07/31/2021    HGB 8 0 (L) 07/29/2021    HGB 8 7 (L) 07/28/2021   · Chronic, no sign of acute blood loss  · Cont to monitor, transfuse if < 7     [address]cythemia vera [address] [address] )  Assessment & Plan  · Patient follows up with outpatient Hematology Oncology, currently on Jakafi     Obstructive uropathy  Assessment & Plan  · S/p ileostomy for nonfunctioning bladder and chronic hydro  · OP urology follow up    Chronic saddle pulmonary embolism ([facility] [address] )  Assessment & Plan  · On apixaban 2 5 mg b i d ; overall respiratory status improving      * COVID-19 virus infection  Assessment & Plan  · Patient presented with fever, generalized weakness, diarrhea decreasing oral intake and difficulty with ambulation for 1 week per admission record review  · COVID-19 pneumonia, unfortunately unvaccinated  · S/p completion of remdesivir x 5 days  · On increased dose of dexamethasone 8mg bid, on gi prophylaxis   · Abx dcd given low procal x 2  · Worsening d-dimer thus recommended per pulmonary to d/c eliquis and start on heparin gtt: D dimer is improving, trending still going down tomorrow, will place her back to eliquis  · Still on 15 liters mid flow   · Now on IV lasix 60mg every other day  · Cont to trend d-dimer  · Remains on stepdown - low threshold to involve critical care   · Respiratory protocol     7/29-overall respiratory status improving; continue present therapy  Currently on 6 L nasal cannula  Patient followed by Nephrology and renal function stable  Started low potassium diet; step-down to med surge  Continue isolation for additional 5 days      7/30-respiratory status improved significantly, currently on 2 L nasal cannula  Renal function, however, not improving as anticipated  Appreciate Nephrology recommendations  Low potassium diet; no need for dialysis  Will continue to monitor on morning labs and potassium lab tonight at 6:00 p m  VTE Pharmacologic Prophylaxis:   Pharmacologic: Apixaban (Eliquis)  Mechanical VTE Prophylaxis in Place: Yes    Patient Centered Rounds: I have performed bedside rounds with nursing staff today  Discussions with Specialists or Other Care Team Provider:  Nephrology    Education and Discussions with Family / Patient: Care plan discussed with patient who voiced understanding and agrees with recommendations  Time Spent for Care: 30 minutes  More than 50% of total time spent on counseling and coordination of care as described above  Current Length of Stay: 16 day(s)    Current Patient Status: Inpatient   Certification Statement: The patient will continue to require additional inpatient hospital stay due to Treatment to cover pneumonia    Discharge Plan: To be determined    Code Status: Level 1 - Full Code      Subjective:   Patient seen examined bedside, respiratory status still improved  Today, renal function is improved to baseline  Unfortunately, patient remains hypokalemic; appreciate Nephrology recommendations-started on Kayexalate  Monitor on labs  Will begin steroid taper  Objective:     Vitals:   Temp (24hrs), Av 7 °F (36 5 °C), Min:97 5 °F (36 4 °C), Max:97 9 °F (36 6 °C)    Temp:  [97 5 °F (36 4 °C)-97 9 °F (36 6 °C)] 97 7 °F (36 5 °C)  HR:  [48-64] 56  Resp:  [12-25] 14  BP: (111-141)/(55-74) 111/68  SpO2:  [98 %-100 %] 100 %  Body mass index is 37 89 kg/m²  Input and Output Summary (last 24 hours): Intake/Output Summary (Last 24 hours) at 2021 1557  Last data filed at 2021 0601  Gross per 24 hour   Intake --   Output 900 ml   Net -900 ml       Physical Exam:     Physical Exam  Vitals and nursing note reviewed  Constitutional:       General: She is not in acute distress  Appearance: She is well-developed  HENT:      Head: Normocephalic and atraumatic  Eyes:      Conjunctiva/sclera: Conjunctivae normal    Cardiovascular:      Rate and Rhythm: Normal rate and regular rhythm  Heart sounds: No murmur heard  Pulmonary:      Effort: Pulmonary effort is normal  No respiratory distress  Comments: Reduced breath sounds  Abdominal:      Palpations: Abdomen is soft  Tenderness: There is no abdominal tenderness  Musculoskeletal:      Cervical back: Neck supple  Comments: Edema right> left upper extremity   Skin:     General: Skin is warm and dry  Neurological:      Mental Status: She is alert and oriented to person, place, and time  Psychiatric:      Comments: Depressed mood; tearful           Additional Data:     Labs:    Results from last 7 days   Lab Units 07/31/21  0621   WBC Thousand/uL 8 48   HEMOGLOBIN g/dL 8 3*   HEMATOCRIT % 26 2*   PLATELETS Thousands/uL 233   NEUTROS PCT % 88*   LYMPHS PCT % 6*   MONOS PCT % 4   EOS PCT % 0     Results from last 7 days   Lab Units 07/31/21  0621   SODIUM mmol/L 137   POTASSIUM mmol/L 5 8*   CHLORIDE mmol/L 105   CO2 mmol/L 24   BUN mg/dL 103*   CREATININE mg/dL 3 32*   ANION GAP mmol/L 8   CALCIUM mg/dL 8 4   ALBUMIN g/dL 2 3*   TOTAL BILIRUBIN mg/dL 0 38   ALK PHOS U/L 55   ALT U/L 14   AST U/L 16   GLUCOSE RANDOM mg/dL 121         Results from last 7 days   Lab Units 07/30/21  0710   POC GLUCOSE mg/dl 182*                   * I Have Reviewed All Lab Data Listed Above  * Additional Pertinent Lab Tests Reviewed:  All Labs Within Last 24 Hours Reviewed    Imaging:    Imaging Reports Reviewed Today Include:   Imaging Personally Reviewed by Myself Includes:      Recent Cultures (last 7 days):           Last 24 Hours Medication List:   Current Facility-Administered Medications   Medication Dose Route Frequency Provider Last Rate    acetaminophen  650 mg Oral Q4H PRN [facility], [facility]      albuterol  2 puff Inhalation Q4H PRN [facility], [facility]      apixaban  2 5 mg Oral BID Yasmeen Delatorre MD      atorvastatin  40 mg Oral Daily With Dinner [facility], [facility]      calcitriol  0 25 mcg Oral Daily [facility], [facility]      cholecalciferol  2,000 Units Oral Daily [facility], [facility]      citalopram  20 mg Oral Daily [facility], [facility]      dexamethasone  8 mg Intravenous Q12H [address] [facility], [facility]      famotidine  20 mg Oral Daily [place], [place]      levothyroxine  75 mcg Oral Daily [place], [place]      melatonin  6 mg Oral HS Yasmeen Delatorre MD      metoprolol tartrate  25 mg Oral BID [facility], [facility]      multivitamin-minerals  1 tablet Oral Daily [place], [place]      ondansetron  4 mg Intravenous Q4H PRN [facility], [facility]      polyethylene glycol  17 g Oral Daily Yasmeen Delatorre MD      ruxolitinib  10 mg Oral Every Other Day [facility], [facility]      saccharomyces boulardii  250 mg Oral BID [facility], [facility]      senna-docusate sodium  2 tablet Oral HS [facility], [facility]      sodium bicarbonate  1,300 mg Oral BID after meals [facility], [facility]          Today, Patient Was Seen By: Tamar Dhillon MD    ** Please Note: Dictation voice to text software may have been used in the creation of this document   **

## 2021-07-31 NOTE — ASSESSMENT & PLAN NOTE
· Patient presented with fever, generalized weakness, diarrhea decreasing oral intake and difficulty with ambulation for 1 week per admission record review  · COVID-19 pneumonia, unfortunately unvaccinated  · S/p completion of remdesivir x 5 days  · On increased dose of dexamethasone 8mg bid, on gi prophylaxis   · Abx dcd given low procal x 2  · Worsening d-dimer thus recommended per pulmonary to d/c eliquis and start on heparin gtt: D dimer is improving, trending still going down tomorrow, will place her back to eliquis  · Still on 15 liters mid flow   · Now on IV lasix 60mg every other day  · Cont to trend d-dimer  · Remains on stepdown - low threshold to involve critical care   · Respiratory protocol     7/29-overall respiratory status improving; continue present therapy  Currently on 6 L nasal cannula  Patient followed by Nephrology and renal function stable  Started low potassium diet; step-down to med surge  Continue isolation for additional 5 days  7/30-respiratory status improved significantly, currently on 2 L nasal cannula  Renal function, however, not improving as anticipated  Appreciate Nephrology recommendations  Low potassium diet; no need for dialysis  Will continue to monitor on morning labs and potassium lab tonight at 6:00 p m  German Casillas

## 2021-07-31 NOTE — ASSESSMENT & PLAN NOTE
Lab Results   Component Value Date    EGFR 13 07/31/2021    EGFR 12 07/30/2021    EGFR 13 07/29/2021    CREATININE 3 32 (H) 07/31/2021    CREATININE 3 55 (H) 07/30/2021    CREATININE 3 29 (H) 07/29/2021   · on CKD stg V, secondary to ATN due to covid infection  · Secondary to sepCr baseline 3 4-3 8, follows with Dr Selena Gan  · Nephrology following   · Prn dosing of IV lasix per fluid status monitoring  · Has R AVF in place: dopplers, no issue edema noted  · No indication for HD at this time  · On bicarb tablets, calcitrol

## 2021-07-31 NOTE — PROGRESS NOTES
NEPHROLOGY PROGRESS NOTE   Eliana Jamil 77 y o  female MRN: [id number]  Unit/Bed#: MICU 14 Encounter: [id number]      ASSESSMENT & PLAN:  1  Acute kidney injury on top of Chronic kidney disease stage 5  Baseline creatinine in the low to mid threes  Creatinine peak at 4 9 on 7/15  Renal function improved, creatinine down to 3 32 today  No urgent indication for dialysis  Follow daily labs and monitor ins and outs  Avoid relative hypotension  Noted BUN increasing suspected secondary to steroids    2  COVID-19 virus infection, management per Medicine team    3  Acute respiratory failure with hypoxemia in the setting of #2  Okay to use diuretics p r n  If hypoxemia worsens  Reported oxygen requirement decreasing, remains on 2 L oxygen via nasal cannula    4  Hemodynamics, blood pressure remained stable, avoid relative hypotension in the setting of advanced chronic kidney disease    5  Anemia multifactorial in setting of CKD, noted patient with history of polycythemia vera, monitor H&H and transfusion as needed    6  Right upper extremity AV fistula in place, right upper extremity edema, right upper extremity Doppler did not show any DVT and fistula is patent    7  Constipation, continue with bowel regimen    8  Mild hyperkalemia, continue with low-potassium diet, will be Kayexalate 15 g today    My plan and recommendations were discussed with Internal Medicine team via [facility] text      SUBJECTIVE:  Patient seen, sleeping, no acute issues reported by nurse    Vitals and telemetry reviewed    OBJECTIVE:  Current Weight: Weight - Scale: 88 kg (194 lb 0 1 oz)  Vitals:    07/30/21 2230   BP: 113/55   Pulse: 56   Resp: 14   Temp:    SpO2: 98%       Intake/Output Summary (Last 24 hours) at 7/31/2021 0815  Last data filed at 7/31/2021 0601  Gross per 24 hour   Intake --   Output 1250 ml   Net -1250 ml     General:  Lying in bed, sleeping, in not acute distress  Eyes:  Eyes closed, no periorbital edema  ENT: lips and mucous membranes moist, oxygen nasal cannula  Neck: supple, no JVD  Chest:  No increased work of breathing  CVS:  Telemetry reviewed  Abdomen:  Nondistended  Extremities:  No significant changes on left upper extremity edema  Skin: no visible rash  Neuro:  Sleeping        Medications:    Current Facility-Administered Medications:     acetaminophen (TYLENOL) tablet 650 mg, 650 mg, Oral, Q4H PRN, Toya Gillespie PA-C    albuterol (PROVENTIL HFA,VENTOLIN HFA) inhaler 2 puff, 2 puff, Inhalation, Q4H PRN, Toya Gillespie PA-C    apixaban (ELIQUIS) tablet 2 5 mg, 2 5 mg, Oral, BID, Bradley Francois MD, 2 5 mg at 07/30/21 1701    atorvastatin (LIPITOR) tablet 40 mg, 40 mg, Oral, Daily With Erick Bermudez PA-C, 40 mg at 07/30/21 1700    bisacodyl (DULCOLAX) rectal suppository 10 mg, 10 mg, Rectal, Daily, Bradley Francois MD, 10 mg at 07/30/21 1647    calcitriol (ROCALTROL) capsule 0 25 mcg, 0 25 mcg, Oral, Daily, Toya Gillespie PA-C, 0 25 mcg at 07/30/21 8480    cholecalciferol (VITAMIN D3) tablet 2,000 Units, 2,000 Units, Oral, Daily, Toya Gillespie PA-C, 2,000 Units at 07/30/21 0809    citalopram (CeleXA) tablet 20 mg, 20 mg, Oral, Daily, Toya Gillespie PA-C, 20 mg at 07/30/21 0808    dexamethasone (DECADRON) injection 8 mg, 8 mg, Intravenous, Q12H [address], Toya Gillespie PA-C, 8 mg at 07/30/21 2215    famotidine (PEPCID) tablet 20 mg, 20 mg, Oral, Daily, Toya Gillespie PA-C, 20 mg at 07/30/21 0844    levothyroxine tablet 75 mcg, 75 mcg, Oral, Daily, Toya Gillespie PA-C, 75 mcg at 07/31/21 3002    melatonin tablet 6 mg, 6 mg, Oral, HS, Bradley Francois MD, 6 mg at 07/30/21 2215    metoprolol tartrate (LOPRESSOR) tablet 25 mg, 25 mg, Oral, BID, Luther Eliecer Funes PA-C, 25 mg at 07/30/21 1700    [COMPLETED] zinc sulfate (ZINCATE) capsule 220 mg, 220 mg, Oral, Daily, 220 mg at 07/21/21 0859 **FOLLOWED BY** multivitamin-minerals (CENTRUM ADULTS) tablet 1 tablet, 1 tablet, Oral, Daily, Denzel Roberts PA-C, 1 tablet at 07/30/21 3517    ondansetron [facility]) injection 4 mg, 4 mg, Intravenous, Q4H PRN, Denzel Roberts PA-C, 4 mg at 07/17/21 0845    polyethylene glycol (MIRALAX) packet 17 g, 17 g, Oral, Daily, Yasmeen Delatorre MD, 17 g at 07/28/21 0949    ruxolitinib (JAKAFI) tablet 10 mg, 10 mg, Oral, Every Other Day, Denzel Roberts PA-C, 10 mg at 07/30/21 7170    saccharomyces boulardii (FLORASTOR) capsule 250 mg, 250 mg, Oral, BID, Denzel Roberts PA-C, 250 mg at 07/30/21 0809    senna-docusate sodium (SENOKOT S) 8 6-50 mg per tablet 2 tablet, 2 tablet, Oral, HS, Denzel Roberts PA-C, 2 tablet at 07/30/21 2215    sodium bicarbonate tablet 1,300 mg, 1,300 mg, Oral, BID after meals, Denzel Roberts PA-C, 1,300 mg at 07/30/21 1646    sodium polystyrene (KAYEXALATE) powder 15 g, 15 g, Oral, Once, Luke Nguyen MD    Invasive Devices:        Lab Results:   Results from last 7 days   Lab Units 07/31/21  0621 07/30/21  1833 07/30/21  0507 07/29/21  0607 07/29/21  0442 07/28/21  0545   WBC Thousand/uL 8 48  --   --  8 85  --  10 45*   HEMOGLOBIN g/dL 8 3*  --   --  8 0*  --  8 7*   HEMATOCRIT % 26 2*  --   --  25 8*  --  26 8*   PLATELETS Thousands/uL 233  --   --  261  --  301   SODIUM mmol/L 137  --  135*  --  137 136   POTASSIUM mmol/L 5 8* 5 2 5 8*  --  5 6* 4 9   CHLORIDE mmol/L 105  --  105  --  105 104   CO2 mmol/L 24  --  25  --  24 25   BUN mg/dL 103*  --  95*  --  96* 89*   CREATININE mg/dL 3 32*  --  3 55*  --  3 29* 3 26*   CALCIUM mg/dL 8 4  --  8 3  --  8 5 8 4   MAGNESIUM mg/dL 2 3  --   --   --   --   --    PHOSPHORUS mg/dL 5 8*  --  4 6*  --   --   --    ALK PHOS U/L 55  --   --   --   --   --    ALT U/L 14  --   --   --   --   --    AST U/L 16  --   --   --   --   --          Portions of the record may have been created with voice recognition software   Occasional wrong word or "sound a like" substitutions may have occurred due to the inherent limitations of voice recognition software  Read the chart carefully and recognize, using context, where substitutions have occurred  If you have any questions, please contact the dictating provider

## 2021-07-31 NOTE — ASSESSMENT & PLAN NOTE
Lab Results   Component Value Date    HGB 8 3 (L) 07/31/2021    HGB 8 0 (L) 07/29/2021    HGB 8 7 (L) 07/28/2021   · Chronic, no sign of acute blood loss  · Cont to monitor, transfuse if < 7

## 2021-08-01 LAB
ALBUMIN SERPL BCP-MCNC: 2.3 G/DL (ref 3.5–5)
ALP SERPL-CCNC: 57 U/L (ref 46–116)
ALT SERPL W P-5'-P-CCNC: 12 U/L (ref 12–78)
ANION GAP SERPL CALCULATED.3IONS-SCNC: 9 MMOL/L (ref 4–13)
AST SERPL W P-5'-P-CCNC: 16 U/L (ref 5–45)
BASOPHILS # BLD AUTO: 0 THOUSANDS/ΜL (ref 0–0.1)
BASOPHILS NFR BLD AUTO: 0 % (ref 0–1)
BILIRUB SERPL-MCNC: 0.44 MG/DL (ref 0.2–1)
BUN SERPL-MCNC: 94 MG/DL (ref 5–25)
CALCIUM ALBUM COR SERPL-MCNC: 9.6 MG/DL (ref 8.3–10.1)
CALCIUM SERPL-MCNC: 8.2 MG/DL (ref 8.3–10.1)
CHLORIDE SERPL-SCNC: 105 MMOL/L (ref 100–108)
CO2 SERPL-SCNC: 24 MMOL/L (ref 21–32)
CREAT SERPL-MCNC: 3.28 MG/DL (ref 0.6–1.3)
EOSINOPHIL # BLD AUTO: 0.01 THOUSAND/ΜL (ref 0–0.61)
EOSINOPHIL NFR BLD AUTO: 0 % (ref 0–6)
ERYTHROCYTE [DISTWIDTH] IN BLOOD BY AUTOMATED COUNT: 16 % (ref 11.6–15.1)
GFR SERPL CREATININE-BSD FRML MDRD: 13 ML/MIN/1.73SQ M
GLUCOSE SERPL-MCNC: 116 MG/DL (ref 65–140)
HCT VFR BLD AUTO: 25.4 % (ref 34.8–46.1)
HGB BLD-MCNC: 8.1 G/DL (ref 11.5–15.4)
IMM GRANULOCYTES # BLD AUTO: 0.08 THOUSAND/UL (ref 0–0.2)
IMM GRANULOCYTES NFR BLD AUTO: 1 % (ref 0–2)
LYMPHOCYTES # BLD AUTO: 0.54 THOUSANDS/ΜL (ref 0.6–4.47)
LYMPHOCYTES NFR BLD AUTO: 7 % (ref 14–44)
MAGNESIUM SERPL-MCNC: 2 MG/DL (ref 1.6–2.6)
MCH RBC QN AUTO: 29.8 PG (ref 26.8–34.3)
MCHC RBC AUTO-ENTMCNC: 31.9 G/DL (ref 31.4–37.4)
MCV RBC AUTO: 93 FL (ref 82–98)
MONOCYTES # BLD AUTO: 0.45 THOUSAND/ΜL (ref 0.17–1.22)
MONOCYTES NFR BLD AUTO: 6 % (ref 4–12)
NEUTROPHILS # BLD AUTO: 7.02 THOUSANDS/ΜL (ref 1.85–7.62)
NEUTS SEG NFR BLD AUTO: 86 % (ref 43–75)
NRBC BLD AUTO-RTO: 0 /100 WBCS
PHOSPHATE SERPL-MCNC: 5.6 MG/DL (ref 2.3–4.1)
PLATELET # BLD AUTO: 193 THOUSANDS/UL (ref 149–390)
PMV BLD AUTO: 10.5 FL (ref 8.9–12.7)
POTASSIUM SERPL-SCNC: 5.2 MMOL/L (ref 3.5–5.3)
PROT SERPL-MCNC: 5.1 G/DL (ref 6.4–8.2)
RBC # BLD AUTO: 2.72 MILLION/UL (ref 3.81–5.12)
SODIUM SERPL-SCNC: 138 MMOL/L (ref 136–145)
WBC # BLD AUTO: 8.1 THOUSAND/UL (ref 4.31–10.16)

## 2021-08-01 PROCEDURE — 84100 ASSAY OF PHOSPHORUS: CPT | Performed by: INTERNAL MEDICINE

## 2021-08-01 PROCEDURE — 99232 SBSQ HOSP IP/OBS MODERATE 35: CPT | Performed by: INTERNAL MEDICINE

## 2021-08-01 PROCEDURE — 85025 COMPLETE CBC W/AUTO DIFF WBC: CPT | Performed by: INTERNAL MEDICINE

## 2021-08-01 PROCEDURE — 83735 ASSAY OF MAGNESIUM: CPT | Performed by: INTERNAL MEDICINE

## 2021-08-01 PROCEDURE — 80053 COMPREHEN METABOLIC PANEL: CPT | Performed by: INTERNAL MEDICINE

## 2021-08-01 RX ADMIN — RUXOLITINIB 10 MG: 10 TABLET ORAL at 09:03

## 2021-08-01 RX ADMIN — FAMOTIDINE 20 MG: 20 TABLET ORAL at 09:02

## 2021-08-01 RX ADMIN — Medication 2000 UNITS: at 09:02

## 2021-08-01 RX ADMIN — CALCITRIOL CAPSULES 0.25 MCG 0.25 MCG: 0.25 CAPSULE ORAL at 09:03

## 2021-08-01 RX ADMIN — MULTIPLE VITAMINS W/ MINERALS TAB 1 TABLET: TAB ORAL at 09:03

## 2021-08-01 RX ADMIN — Medication 250 MG: at 18:08

## 2021-08-01 RX ADMIN — SODIUM BICARBONATE 650 MG TABLET 1300 MG: at 09:02

## 2021-08-01 RX ADMIN — APIXABAN 2.5 MG: 2.5 TABLET, FILM COATED ORAL at 18:20

## 2021-08-01 RX ADMIN — DEXAMETHASONE SODIUM PHOSPHATE 4 MG: 4 INJECTION INTRA-ARTICULAR; INTRALESIONAL; INTRAMUSCULAR; INTRAVENOUS; SOFT TISSUE at 09:08

## 2021-08-01 RX ADMIN — METOPROLOL TARTRATE 25 MG: 25 TABLET, FILM COATED ORAL at 18:09

## 2021-08-01 RX ADMIN — LEVOTHYROXINE SODIUM 75 MCG: 75 TABLET ORAL at 05:04

## 2021-08-01 RX ADMIN — METOPROLOL TARTRATE 25 MG: 25 TABLET, FILM COATED ORAL at 09:01

## 2021-08-01 RX ADMIN — APIXABAN 2.5 MG: 2.5 TABLET, FILM COATED ORAL at 09:02

## 2021-08-01 RX ADMIN — SODIUM BICARBONATE 650 MG TABLET 1300 MG: at 18:09

## 2021-08-01 RX ADMIN — Medication 250 MG: at 09:02

## 2021-08-01 RX ADMIN — CITALOPRAM HYDROBROMIDE 20 MG: 20 TABLET ORAL at 09:02

## 2021-08-01 RX ADMIN — ATORVASTATIN CALCIUM 40 MG: 40 TABLET, FILM COATED ORAL at 18:09

## 2021-08-01 RX ADMIN — SENNOSIDES AND DOCUSATE SODIUM 2 TABLET: 8.6; 5 TABLET ORAL at 22:15

## 2021-08-01 RX ADMIN — DEXAMETHASONE SODIUM PHOSPHATE 4 MG: 4 INJECTION INTRA-ARTICULAR; INTRALESIONAL; INTRAMUSCULAR; INTRAVENOUS; SOFT TISSUE at 20:01

## 2021-08-01 RX ADMIN — MELATONIN TAB 3 MG 6 MG: 3 TAB at 22:15

## 2021-08-01 NOTE — ASSESSMENT & PLAN NOTE
Lab Results   Component Value Date    HGB 8 1 (L) 08/01/2021    HGB 8 3 (L) 07/31/2021    HGB 8 0 (L) 07/29/2021   · Chronic, no sign of acute blood loss  · Cont to monitor, transfuse if < 7

## 2021-08-01 NOTE — PROGRESS NOTES
NEPHROLOGY PROGRESS NOTE   Laura Rivera 77 y o  female MRN: [id number]  Unit/Bed#: MICU 14 Encounter: [id number]      ASSESSMENT & PLAN:  1  Acute kidney injury on top of Chronic kidney disease stage 5  Baseline creatinine in the low to mid threes  Creatinine peak at 4 9 on 7/15  Renal function improved and is back to baseline with a creatinine of 3 28 today  No indication for dialysis today moment  Follow daily labs and monitor ins and outs  Avoid relative hypotension  Elevated BUN likely secondary to steroids, is trending down    2  COVID-19 virus infection, management per Medicine team    3  Acute respiratory failure with hypoxemia in the setting of #2  Okay to use diuretics p r n  If hypoxemia worsens  Oxygen requirements improving  Patient remains on 2 L oxygen    4  Hemodynamics, blood pressure stable, avoid relative hypotension in the setting of advanced chronic kidney disease    5  Anemia multifactorial in setting of CKD, noted patient with history of polycythemia vera, monitor H&H and transfusion as needed, hemoglobin low but stable at 8 1 today    6  Right upper extremity AV fistula in place, right upper extremity edema, right upper extremity Doppler did not show any DVT and fistula is patent    7  Constipation, continue with bowel regimen    8  Mild hyperkalemia, improving after Kayexalate yesterday  Continue with low-potassium diet  My plan and recommendations were discussed with Internal Medicine team via [facility] text      SUBJECTIVE:  Patient seen and evaluated, sleeping  Discussed with nurse, no acute issues overnight  Remains in 2 L oxygen nasal cannula  Reported good urine output        OBJECTIVE:  Current Weight: Weight - Scale: 88 kg (194 lb 0 1 oz)  Vitals:    08/01/21 0515   BP: 159/77   Pulse: (!) 52   Resp: 17   Temp:    SpO2: 98%       Intake/Output Summary (Last 24 hours) at 8/1/2021 0818  Last data filed at 8/1/2021 0514  Gross per 24 hour   Intake 720 ml   Output 1200 ml Net -480 ml     General:  Sleeping, in not acute distress  Eyes:  Eyes are closed, no periorbital edema  ENT: lips and mucous membranes moist, oxygen nasal cannula  Neck: supple, no JVD  Chest:  no increased work of breathing  CVS:  Telemetry reviewed  Abdomen:  Nondistended  Extremities: no visible edema of both legs, left upper extremity with edema  Skin: no visible rash  Neuro:  Sleeping          Medications:    Current Facility-Administered Medications:     acetaminophen (TYLENOL) tablet 650 mg, 650 mg, Oral, Q4H PRN, Dianne Benítez PA-C    albuterol (PROVENTIL HFA,VENTOLIN HFA) inhaler 2 puff, 2 puff, Inhalation, Q4H PRN, Dianne Benítez PA-C    apixaban (ELIQUIS) tablet 2 5 mg, 2 5 mg, Oral, BID, Janna Feng MD, 2 5 mg at 07/31/21 1735    atorvastatin (LIPITOR) tablet 40 mg, 40 mg, Oral, Daily With Dinner, Dianne Benítez PA-C, 40 mg at 07/31/21 1735    calcitriol (ROCALTROL) capsule 0 25 mcg, 0 25 mcg, Oral, Daily, Dianne Benítez PA-C, 0 25 mcg at 07/31/21 1005    cholecalciferol (VITAMIN D3) tablet 2,000 Units, 2,000 Units, Oral, Daily, Dianne Benítez PA-C, 2,000 Units at 07/31/21 4894    citalopram (CeleXA) tablet 20 mg, 20 mg, Oral, Daily, Dianne Benítez PA-C, 20 mg at 07/31/21 5187    dexamethasone (DECADRON) injection 4 mg, 4 mg, Intravenous, Q12H [address], Shagufta De La Torre MD, 4 mg at 07/31/21 2127    famotidine (PEPCID) tablet 20 mg, 20 mg, Oral, Daily, Dianne Benítez PA-C, 20 mg at 07/31/21 7468    levothyroxine tablet 75 mcg, 75 mcg, Oral, Daily, Dianne Benítez PA-C, 75 mcg at 08/01/21 0504    melatonin tablet 6 mg, 6 mg, Oral, HS, Janna Feng MD, 6 mg at 07/31/21 2126    metoprolol tartrate (LOPRESSOR) tablet 25 mg, 25 mg, Oral, BID, Dianne Benítez PA-C, 25 mg at 07/31/21 1735    [COMPLETED] zinc sulfate (ZINCATE) capsule 220 mg, 220 mg, Oral, Daily, 220 mg at 07/21/21 0859 **FOLLOWED BY** multivitamin-minerals (CENTRUM ADULTS) tablet 1 tablet, 1 tablet, Oral, Daily, Tripp Russo PA-C, 1 tablet at 07/31/21 0958    ondansetron [facility]) injection 4 mg, 4 mg, Intravenous, Q4H PRN, Tripp Russo PA-C, 4 mg at 07/17/21 0845    polyethylene glycol (MIRALAX) packet 17 g, 17 g, Oral, Daily, Nadia Geronimo MD, 17 g at 07/31/21 0957    ruxolitinib (JAKAFI) tablet 10 mg, 10 mg, Oral, Every Other Day, Tripp Russo PA-C, 10 mg at 07/30/21 6288    saccharomyces boulardii (FLORASTOR) capsule 250 mg, 250 mg, Oral, BID, Tripp Russo PA-C, 250 mg at 07/31/21 1735    senna-docusate sodium (SENOKOT S) 8 6-50 mg per tablet 2 tablet, 2 tablet, Oral, HS, Tripp Russo PA-C, 2 tablet at 07/30/21 2215    sodium bicarbonate tablet 1,300 mg, 1,300 mg, Oral, BID after meals, Tripp Russo PA-C, 1,300 mg at 07/31/21 1735    Invasive Devices:        Lab Results:   Results from last 7 days   Lab Units 08/01/21  0508 07/31/21  1730 07/31/21  0621 07/30/21  0507 07/29/21  0607   WBC Thousand/uL 8 10  --  8 48  --  8 85   HEMOGLOBIN g/dL 8 1*  --  8 3*  --  8 0*   HEMATOCRIT % 25 4*  --  26 2*  --  25 8*   PLATELETS Thousands/uL 193  --  233  --  261   SODIUM mmol/L 138  --  137 135*  --    POTASSIUM mmol/L 5 2 5 1 5 8* 5 8*  --    CHLORIDE mmol/L 105  --  105 105  --    CO2 mmol/L 24  --  24 25  --    BUN mg/dL 94*  --  103* 95*  --    CREATININE mg/dL 3 28*  --  3 32* 3 55*  --    CALCIUM mg/dL 8 2*  --  8 4 8 3  --    MAGNESIUM mg/dL 2 0  --  2 3  --   --    PHOSPHORUS mg/dL 5 6*  --  5 8* 4 6*  --    ALK PHOS U/L 57  --  55  --   --    ALT U/L 12  --  14  --   --    AST U/L 16  --  16  --   --          Portions of the record may have been created with voice recognition software  Occasional wrong word or "sound a like" substitutions may have occurred due to the inherent limitations of voice recognition software  Read the chart carefully and recognize, using context, where substitutions have occurred  If you have any questions, please contact the dictating provider

## 2021-08-01 NOTE — ASSESSMENT & PLAN NOTE
· Patient presented with fever, generalized weakness, diarrhea decreasing oral intake and difficulty with ambulation for 1 week per admission record review  · COVID-19 pneumonia, unfortunately unvaccinated  · S/p completion of remdesivir x 5 days  · On increased dose of dexamethasone 8mg bid, on gi prophylaxis   · Abx dcd given low procal x 2  · Worsening d-dimer thus recommended per pulmonary to d/c eliquis and start on heparin gtt: D dimer is improving, trending still going down tomorrow, will place her back to eliquis  · Still on 15 liters mid flow   · Now on IV lasix 60mg every other day  · Cont to trend d-dimer  · Remains on stepdown - low threshold to involve critical care   · Respiratory protocol     7/29-overall respiratory status improving; continue present therapy  Currently on 6 L nasal cannula  Patient followed by Nephrology and renal function stable  Started low potassium diet; step-down to med surge  Continue isolation for additional 5 days  7/30-respiratory status improved significantly, currently on 2 L nasal cannula  Renal function, however, not improving as anticipated  Appreciate Nephrology recommendations  Low potassium diet; no need for dialysis  Will continue to monitor on morning labs and potassium lab tonight at 6:00 p m       8/1-respiratory status remains stable; patient requiring 2 L  Have discussed with Nephrology, renal status is stable and at baseline  Potassium levels improved on Kayexalate; monitor and re-initiate as needed

## 2021-08-01 NOTE — ASSESSMENT & PLAN NOTE
Lab Results   Component Value Date    EGFR 13 08/01/2021    EGFR 13 07/31/2021    EGFR 12 07/30/2021    CREATININE 3 28 (H) 08/01/2021    CREATININE 3 32 (H) 07/31/2021    CREATININE 3 55 (H) 07/30/2021   · on CKD stg V, secondary to ATN due to covid infection  · Secondary to sepCr baseline 3 4-3 8, follows with Dr Delmar Willams  · Nephrology following   · Prn dosing of IV lasix per fluid status monitoring  · Has R AVF in place: dopplers, no issue edema noted  · No indication for HD at this time  · On bicarb tablets, calcitrol

## 2021-08-01 NOTE — PROGRESS NOTES
[facility]  Progress Note - Jody Darden [birth year], 77 y o  female MRN: [id number]  Unit/Bed#: MICU 14 Encounter: [id number]  Primary Care Provider: Moni Brasher MD   Date and time admitted to hospital: 7/15/2021 10:52 AM    Hyperkalemia  Assessment & Plan  Continue to monitor; low-potassium diet  Started on Kayexalate on 07/31; asymptomatic    Edema of right upper extremity  Assessment & Plan  Chronic as + AVF in place but more increased than usual as also confirmed per nephrology  Check upper extremity doppler-negative  Already on heparin gtt  Improving    Acute respiratory failure with hypoxia ([address] )  Assessment & Plan  · In the setting of COVID-19 as above  · Plan as per above  · Now saturating well on 2 L; much improved    Constipation  Assessment & Plan  Patient is having constipation, due probably to immobility and medications  Continue with senna and colace, miralax and dulcolax suppository was ordered  Monitor bm     Class 2 severe obesity due to excess calories with serious comorbidity and body mass index (BMI) of 35 0 to 35 9 in adult [facility])  Assessment & Plan  Body mass index is 37 89 kg/m²  · Nutrition consult once improved from above    History of Clostridioides difficile colitis  Assessment & Plan  · Diarrhea likely secondary to covid 19 infection; resolved  · Noted history of C  Diff  · C  Diff testing was negative on admission, however patient did received IV abx   Received PO Vanco for prophylaxis for 3 days post last dose of IV abx    Acute renal failure superimposed on stage 5 chronic kidney disease, not on chronic dialysis [facility])  Assessment & Plan  Lab Results   Component Value Date    EGFR 13 08/01/2021    EGFR 13 07/31/2021    EGFR 12 07/30/2021    CREATININE 3 28 (H) 08/01/2021    CREATININE 3 32 (H) 07/31/2021    CREATININE 3 55 (H) 07/30/2021   · on CKD stg V, secondary to ATN due to covid infection  · Secondary to sepCr baseline 3 4-3 8, follows with Dr Nancy Moreno  · Nephrology following   · Prn dosing of IV lasix per fluid status monitoring  · Has R AVF in place: dopplers, no issue edema noted  · No indication for HD at this time  · On bicarb tablets, calcitrol      Anemia in CKD (chronic kidney disease)  Assessment & Plan  Lab Results   Component Value Date    HGB 8 1 (L) 08/01/2021    HGB 8 3 (L) 07/31/2021    HGB 8 0 (L) 07/29/2021   · Chronic, no sign of acute blood loss  · Cont to monitor, transfuse if < 7     Polycythemia vera ([facility] [address] )  Assessment & Plan  · Patient follows up with outpatient Hematology Oncology, currently on Jakafi     Obstructive uropathy  Assessment & Plan  · S/p ileostomy for nonfunctioning bladder and chronic hydro  · OP urology follow up    Chronic saddle pulmonary embolism ([facility] [address] )  Assessment & Plan  · On apixaban 2 5 mg b i d ; overall respiratory status improving      * COVID-19 virus infection  Assessment & Plan  · Patient presented with fever, generalized weakness, diarrhea decreasing oral intake and difficulty with ambulation for 1 week per admission record review  · COVID-19 pneumonia, unfortunately unvaccinated  · S/p completion of remdesivir x 5 days  · On increased dose of dexamethasone 8mg bid, on gi prophylaxis   · Abx dcd given low procal x 2  · Worsening d-dimer thus recommended per pulmonary to d/c eliquis and start on heparin gtt: D dimer is improving, trending still going down tomorrow, will place her back to eliquis  · Still on 15 liters mid flow   · Now on IV lasix 60mg every other day  · Cont to trend d-dimer  · Remains on stepdown - low threshold to involve critical care   · Respiratory protocol     7/29-overall respiratory status improving; continue present therapy  Currently on 6 L nasal cannula  Patient followed by Nephrology and renal function stable  Started low potassium diet; step-down to med surge  Continue isolation for additional 5 days      7/30-respiratory status improved significantly, currently on 2 L nasal cannula  Renal function, however, not improving as anticipated  Appreciate Nephrology recommendations  Low potassium diet; no need for dialysis  Will continue to monitor on morning labs and potassium lab tonight at 6:00 p m       -respiratory status remains stable; patient requiring 2 L  Have discussed with Nephrology, renal status is stable and at baseline  Potassium levels improved on Kayexalate; monitor and re-initiate as needed  VTE Pharmacologic Prophylaxis:   Pharmacologic: Apixaban (Eliquis)  Mechanical VTE Prophylaxis in Place: Yes    Patient Centered Rounds: I have performed bedside rounds with nursing staff today  Discussions with Specialists or Other Care Team Provider:  Nephrology    Education and Discussions with Family / Patient: Discussed treatment plan with family and patient who agree with current plan; encouraged to ask questions and participate  Time Spent for Care: 30 minutes  More than 50% of total time spent on counseling and coordination of care as described above  Current Length of Stay: 17 day(s)    Current Patient Status: Inpatient   Certification Statement: The patient will continue to require additional inpatient hospital stay due to Treatment of COVID pneumonia    Discharge Plan: To be determined, likely tomorrow    Code Status: Level 1 - Full Code      Subjective:   Patient seen examined bedside, no acute distress or discomfort noted  Trialed off of oxygen with SpO2 of 97%  Will get ambulatory oxygen testing tomorrow; may be stable for discharge tomorrow  As per Nephrology, renal function at baseline  If can tolerate off oxygen overnight; stable for discharge tomorrow with 2 more days of isolation and steroid taper      Objective:     Vitals:   Temp (24hrs), Av 3 °F (36 8 °C), Min:97 9 °F (36 6 °C), Max:98 7 °F (37 1 °C)    Temp:  [97 9 °F (36 6 °C)-98 7 °F (37 1 °C)] 98 4 °F (36 9 °C)  HR:  [52-97] 97  Resp:  [17-20] 20  BP: (132-159)/(67-87) 136/76  SpO2:  [95 %-98 %] 95 %  Body mass index is 37 89 kg/m²  Input and Output Summary (last 24 hours): Intake/Output Summary (Last 24 hours) at 8/1/2021 1919  Last data filed at 8/1/2021 1801  Gross per 24 hour   Intake 480 ml   Output 1300 ml   Net -820 ml       Physical Exam:     Physical Exam  Vitals and nursing note reviewed  Constitutional:       General: She is not in acute distress  Appearance: She is well-developed  HENT:      Head: Normocephalic and atraumatic  Eyes:      Conjunctiva/sclera: Conjunctivae normal    Cardiovascular:      Rate and Rhythm: Normal rate and regular rhythm  Heart sounds: No murmur heard  Pulmonary:      Effort: Pulmonary effort is normal  No respiratory distress  Comments: Reduced breath sounds  Abdominal:      Palpations: Abdomen is soft  Tenderness: There is no abdominal tenderness  Musculoskeletal:      Cervical back: Neck supple  Comments: Edema right> left upper extremity   Skin:     General: Skin is warm and dry  Neurological:      Mental Status: She is alert and oriented to person, place, and time  Psychiatric:      Comments: Depressed mood; tearful           Additional Data:     Labs:    Results from last 7 days   Lab Units 08/01/21  0508   WBC Thousand/uL 8 10   HEMOGLOBIN g/dL 8 1*   HEMATOCRIT % 25 4*   PLATELETS Thousands/uL 193   NEUTROS PCT % 86*   LYMPHS PCT % 7*   MONOS PCT % 6   EOS PCT % 0     Results from last 7 days   Lab Units 08/01/21  0508   SODIUM mmol/L 138   POTASSIUM mmol/L 5 2   CHLORIDE mmol/L 105   CO2 mmol/L 24   BUN mg/dL 94*   CREATININE mg/dL 3 28*   ANION GAP mmol/L 9   CALCIUM mg/dL 8 2*   ALBUMIN g/dL 2 3*   TOTAL BILIRUBIN mg/dL 0 44   ALK PHOS U/L 57   ALT U/L 12   AST U/L 16   GLUCOSE RANDOM mg/dL 116         Results from last 7 days   Lab Units 07/30/21  0710   POC GLUCOSE mg/dl 182*                   * I Have Reviewed All Lab Data Listed Above    * Additional Pertinent Lab Tests Reviewed: All Labs Within Last 24 Hours Reviewed    Imaging:    Imaging Reports Reviewed Today Include:   Imaging Personally Reviewed by Myself Includes:      Recent Cultures (last 7 days):           Last 24 Hours Medication List:   Current Facility-Administered Medications   Medication Dose Route Frequency Provider Last Rate    acetaminophen  650 mg Oral Q4H PRN Felecia Herb, PA-C      albuterol  2 puff Inhalation Q4H PRN Felecia Herb, PA-C      apixaban  2 5 mg Oral BID Moose Roger MD      atorvastatin  40 mg Oral Daily With Dinner Felecia Herb, PA-C      calcitriol  0 25 mcg Oral Daily Felecia Herb, PA-C      cholecalciferol  2,000 Units Oral Daily Felecia Herb, PA-C      citalopram  20 mg Oral Daily Felecia Herb, PA-C      dexamethasone  4 mg Intravenous Q12H [address] Ottoliliana Ann MD      famotidine  20 mg Oral Daily Felecia Herb, PA-C      levothyroxine  75 mcg Oral Daily Felecia Herb, PA-C      melatonin  6 mg Oral HS Moose Roger MD      metoprolol tartrate  25 mg Oral BID Felecia Herb, PA-C      multivitamin-minerals  1 tablet Oral Daily Felecia Herb, PA-C      ondansetron  4 mg Intravenous Q4H PRN Felecia Herb, PA-C      polyethylene glycol  17 g Oral Daily Moose Roger MD      ruxolitinib  10 mg Oral Every Other Day Felecia Herb, PA-C      saccharomyces boulardii  250 mg Oral BID Felecia Herb, PA-C      senna-docusate sodium  2 tablet Oral HS Felecia Herb, PA-C      sodium bicarbonate  1,300 mg Oral BID after meals Felecia Herb, PA-C          Today, Patient Was Seen By: Amara Miguel MD    ** Please Note: Dictation voice to text software may have been used in the creation of this document   **

## 2021-08-02 LAB
ALBUMIN SERPL BCP-MCNC: 2.2 G/DL (ref 3.5–5)
ALP SERPL-CCNC: 54 U/L (ref 46–116)
ALT SERPL W P-5'-P-CCNC: 13 U/L (ref 12–78)
ANION GAP SERPL CALCULATED.3IONS-SCNC: 6 MMOL/L (ref 4–13)
AST SERPL W P-5'-P-CCNC: 18 U/L (ref 5–45)
BASOPHILS # BLD AUTO: 0 THOUSANDS/ΜL (ref 0–0.1)
BASOPHILS NFR BLD AUTO: 0 % (ref 0–1)
BILIRUB SERPL-MCNC: 0.4 MG/DL (ref 0.2–1)
BUN SERPL-MCNC: 97 MG/DL (ref 5–25)
CALCIUM ALBUM COR SERPL-MCNC: 9.4 MG/DL (ref 8.3–10.1)
CALCIUM SERPL-MCNC: 8 MG/DL (ref 8.3–10.1)
CHLORIDE SERPL-SCNC: 105 MMOL/L (ref 100–108)
CO2 SERPL-SCNC: 25 MMOL/L (ref 21–32)
CREAT SERPL-MCNC: 3.27 MG/DL (ref 0.6–1.3)
EOSINOPHIL # BLD AUTO: 0.01 THOUSAND/ΜL (ref 0–0.61)
EOSINOPHIL NFR BLD AUTO: 0 % (ref 0–6)
ERYTHROCYTE [DISTWIDTH] IN BLOOD BY AUTOMATED COUNT: 16.2 % (ref 11.6–15.1)
GFR SERPL CREATININE-BSD FRML MDRD: 13 ML/MIN/1.73SQ M
GLUCOSE SERPL-MCNC: 112 MG/DL (ref 65–140)
HCT VFR BLD AUTO: 25 % (ref 34.8–46.1)
HGB BLD-MCNC: 7.9 G/DL (ref 11.5–15.4)
IMM GRANULOCYTES # BLD AUTO: 0.07 THOUSAND/UL (ref 0–0.2)
IMM GRANULOCYTES NFR BLD AUTO: 1 % (ref 0–2)
LYMPHOCYTES # BLD AUTO: 0.63 THOUSANDS/ΜL (ref 0.6–4.47)
LYMPHOCYTES NFR BLD AUTO: 10 % (ref 14–44)
MAGNESIUM SERPL-MCNC: 1.9 MG/DL (ref 1.6–2.6)
MCH RBC QN AUTO: 29.8 PG (ref 26.8–34.3)
MCHC RBC AUTO-ENTMCNC: 31.6 G/DL (ref 31.4–37.4)
MCV RBC AUTO: 94 FL (ref 82–98)
MONOCYTES # BLD AUTO: 0.43 THOUSAND/ΜL (ref 0.17–1.22)
MONOCYTES NFR BLD AUTO: 7 % (ref 4–12)
NEUTROPHILS # BLD AUTO: 5.38 THOUSANDS/ΜL (ref 1.85–7.62)
NEUTS SEG NFR BLD AUTO: 82 % (ref 43–75)
NRBC BLD AUTO-RTO: 0 /100 WBCS
PHOSPHATE SERPL-MCNC: 5.4 MG/DL (ref 2.3–4.1)
PLATELET # BLD AUTO: 183 THOUSANDS/UL (ref 149–390)
PMV BLD AUTO: 10.6 FL (ref 8.9–12.7)
POTASSIUM SERPL-SCNC: 4.9 MMOL/L (ref 3.5–5.3)
PROT SERPL-MCNC: 5.1 G/DL (ref 6.4–8.2)
RBC # BLD AUTO: 2.65 MILLION/UL (ref 3.81–5.12)
SODIUM SERPL-SCNC: 136 MMOL/L (ref 136–145)
WBC # BLD AUTO: 6.52 THOUSAND/UL (ref 4.31–10.16)

## 2021-08-02 PROCEDURE — 85025 COMPLETE CBC W/AUTO DIFF WBC: CPT | Performed by: PHYSICIAN ASSISTANT

## 2021-08-02 PROCEDURE — 99232 SBSQ HOSP IP/OBS MODERATE 35: CPT | Performed by: INTERNAL MEDICINE

## 2021-08-02 PROCEDURE — 97168 OT RE-EVAL EST PLAN CARE: CPT

## 2021-08-02 PROCEDURE — 83735 ASSAY OF MAGNESIUM: CPT | Performed by: PHYSICIAN ASSISTANT

## 2021-08-02 PROCEDURE — 97116 GAIT TRAINING THERAPY: CPT

## 2021-08-02 PROCEDURE — 80053 COMPREHEN METABOLIC PANEL: CPT | Performed by: PHYSICIAN ASSISTANT

## 2021-08-02 PROCEDURE — 84100 ASSAY OF PHOSPHORUS: CPT | Performed by: PHYSICIAN ASSISTANT

## 2021-08-02 RX ORDER — SODIUM BICARBONATE 650 MG/1
1300 TABLET ORAL
Qty: 120 TABLET | Refills: 0 | Status: CANCELLED | OUTPATIENT
Start: 2021-08-02

## 2021-08-02 RX ORDER — PREDNISONE 20 MG/1
40 TABLET ORAL DAILY
Status: DISCONTINUED | OUTPATIENT
Start: 2021-08-03 | End: 2021-08-03

## 2021-08-02 RX ORDER — SODIUM BICARBONATE 650 MG/1
650 TABLET ORAL
Status: DISCONTINUED | OUTPATIENT
Start: 2021-08-02 | End: 2021-08-03 | Stop reason: HOSPADM

## 2021-08-02 RX ADMIN — APIXABAN 2.5 MG: 2.5 TABLET, FILM COATED ORAL at 09:04

## 2021-08-02 RX ADMIN — Medication 250 MG: at 17:50

## 2021-08-02 RX ADMIN — Medication 250 MG: at 09:13

## 2021-08-02 RX ADMIN — MULTIPLE VITAMINS W/ MINERALS TAB 1 TABLET: TAB ORAL at 09:04

## 2021-08-02 RX ADMIN — DEXAMETHASONE SODIUM PHOSPHATE 4 MG: 4 INJECTION INTRA-ARTICULAR; INTRALESIONAL; INTRAMUSCULAR; INTRAVENOUS; SOFT TISSUE at 09:09

## 2021-08-02 RX ADMIN — SODIUM BICARBONATE 650 MG TABLET 650 MG: at 16:56

## 2021-08-02 RX ADMIN — METOPROLOL TARTRATE 25 MG: 25 TABLET, FILM COATED ORAL at 09:04

## 2021-08-02 RX ADMIN — LEVOTHYROXINE SODIUM 75 MCG: 75 TABLET ORAL at 05:01

## 2021-08-02 RX ADMIN — FAMOTIDINE 20 MG: 20 TABLET ORAL at 09:04

## 2021-08-02 RX ADMIN — APIXABAN 2.5 MG: 2.5 TABLET, FILM COATED ORAL at 17:48

## 2021-08-02 RX ADMIN — CALCITRIOL CAPSULES 0.25 MCG 0.25 MCG: 0.25 CAPSULE ORAL at 11:03

## 2021-08-02 RX ADMIN — ATORVASTATIN CALCIUM 40 MG: 40 TABLET, FILM COATED ORAL at 17:48

## 2021-08-02 RX ADMIN — POLYETHYLENE GLYCOL 3350 17 G: 17 POWDER, FOR SOLUTION ORAL at 09:04

## 2021-08-02 RX ADMIN — METOPROLOL TARTRATE 25 MG: 25 TABLET, FILM COATED ORAL at 17:48

## 2021-08-02 RX ADMIN — CITALOPRAM HYDROBROMIDE 20 MG: 20 TABLET ORAL at 09:37

## 2021-08-02 RX ADMIN — Medication 2000 UNITS: at 09:04

## 2021-08-02 RX ADMIN — MELATONIN TAB 3 MG 6 MG: 3 TAB at 21:30

## 2021-08-02 RX ADMIN — SODIUM BICARBONATE 650 MG TABLET 1300 MG: at 09:14

## 2021-08-02 NOTE — ASSESSMENT & PLAN NOTE
· In the setting of COVID-19 as above  · Plan as per above  · Now saturating well on 2 L; much improved  · 8/2 - resolved

## 2021-08-02 NOTE — PHYSICAL THERAPY NOTE
PHYSICAL THERAPY NOTE          Patient Name: Angie MAIN Date: 8/2/2021 08/02/21 1215   PT Last Visit   PT Visit Date 08/02/21   Note Type   Note Type Treatment   Pain Assessment   Pain Assessment Tool Pain Assessment not indicated - pt denies pain   Pain Score No Pain   Hospital Pain Intervention(s) Ambulation/increased activity   Restrictions/Precautions   Weight Bearing Precautions Per Order No   Other Precautions Airborne/isolation;Contact/isolation; Chair Alarm; Bed Alarm;Telemetry;Multiple lines; Fall Risk;Pain   General   Chart Reviewed Yes   Family/Caregiver Present No   Cognition   Orientation Level Oriented X4   Bed Mobility   Supine to Sit Unable to assess   Sit to Supine Unable to assess   Additional Comments Pt found resting in chair, left resting in chair as requested   Transfers   Sit to Stand 4  Minimal assistance   Additional items Assist x 1; Increased time required   Stand to Sit 4  Minimal assistance   Additional items Assist x 1; Increased time required   Ambulation/Elevation   Gait pattern Excessively slow; Shuffling;Decreased foot clearance   Gait Assistance 4  Minimal assist   Additional items Assist x 1   Assistive Device Rolling walker   Distance 3+3 + marching x 20    Balance   Static Sitting Fair +   Dynamic Sitting Fair   Static Standing Fair -   Dynamic Standing Poor +   Ambulatory Poor +   Endurance Deficit   Endurance Deficit Yes   Endurance Deficit Description fatigue, weakness   Activity Tolerance   Activity Tolerance Patient limited by fatigue   Medical Staff Made Aware CM for D/C planning   Nurse Made Aware yes, nsg gave clearance to work with pt   Exercises   Knee AROM Long Arc Quad 20 reps; Sitting   Assessment   Prognosis Good   Problem List Decreased strength;Decreased range of motion;Decreased endurance; Impaired balance;Decreased mobility; Decreased safety awareness;Decreased cognition;Pain;Orthopedic restrictions   Assessment Pt required increased encouragement to participate  Required A for transfers with decreased strength and balance  Demonstrated decreased foot clearance during ambulation  Poor activity tolerance with fatigue  Demonstrated ability to maintain SpO2 at 90% on RA with minimal activity this session  Pt will benefit from continued inpt skilled PT and rehab to maximize functional moblility & safety  Barriers to Discharge Inaccessible home environment   Goals   Patient Goals None stated   STG Expiration Date 08/14/21   Short Term Goal #2 Continue to progress eval goals, slow progress with decreased activity tolerance   PT Treatment Day 4   Plan   Treatment/Interventions OT; Spoke to nursing;Gait training;Bed mobility; Patient/family training; Endurance training;Functional transfer training;LE strengthening/ROM   Progress Slow progress, decreased activity tolerance   PT Frequency Other (Comment)  (3-5x/wk)   Recommendation   PT Discharge Recommendation Post acute rehabilitation services   PT - OK to Discharge Yes   AM-PAC Basic Mobility Inpatient   Turning in Bed Without Bedrails 3   Lying on Back to Sitting on Edge of Flat Bed 3   Moving Bed to Chair 3   Standing Up From Chair 3   Walk in Room 3   Climb 3-5 Stairs 2   Basic Mobility Inpatient Raw Score 17   Basic Mobility Standardized Score 39 67     Eliana Maharaj, PT

## 2021-08-02 NOTE — OCCUPATIONAL THERAPY NOTE
Occupational Therapy Re-Evaluation     Keri Paez    8/2/2021    Principal Problem:    COVID-19 virus infection  Active Problems:    Chronic saddle pulmonary embolism ([facility] [address] )    Obstructive uropathy    Polycythemia vera ([facility] [address] )    Anemia in CKD (chronic kidney disease)    Acute renal failure superimposed on stage 5 chronic kidney disease, not on chronic dialysis ([facility] [address] )    History of Clostridioides difficile colitis    Class 2 severe obesity due to excess calories with serious comorbidity and body mass index (BMI) of 35 0 to 35 9 in [facility])    Constipation    Acute respiratory failure with hypoxia (HCC)    Edema of right upper extremity    Hyperkalemia      @hPMHl@    Past Surgical History:   Procedure Laterality Date    ABDOMINAL ADHESION SURGERY N/A 8/9/2020    Procedure: LYSIS ADHESIONS;  Surgeon: Nestor Martinez DO;  [facility];  Service: General    BLADDER SUSPENSION      BOTOX INJECTION N/A 7/27/2016    Procedure: BOTOX INJECTION ;  Surgeon: Rad Aquino MD;  [facility];  Service:     CHOLECYSTECTOMY N/A     COLONOSCOPY      CYSTECTOMY, RADICAL WITH ILEOCONDUIT N/A 10/4/2016    Procedure: Byrnes Sin ;  Surgeon: Rad Aquino MD;  [facility];  Service:    Muna Coma W/ RETROGRADES Bilateral 7/27/2016    Procedure: Erin First; RETROGRADE PYELOGRAM ;  Surgeon: Rad Aquino MD;  [facility];  Service:     HERNIA REPAIR      IR AV FISTULAGRAM/GRAFTOGRAM  2/10/2021    IR NEPHROSTOMY TO NEPHROURETERAL STENT  5/15/2021    IR NEPHROSTOMY TO NEPHROURETERAL STENT  6/9/2021    IR NEPHROSTOMY TUBE CHECK AND/OR REMOVAL  6/16/2021    IR NEPHROSTOMY TUBE CHECK/CHANGE/REPOSITION/REINSERTION/UPSIZE  5/14/2021    IR NEPHROSTOMY TUBE CHECK/CHANGE/REPOSITION/REINSERTION/UPSIZE  5/21/2021    IR NEPHROSTOMY TUBE PLACEMENT  5/10/2021    IR NON-TUNNELED CENTRAL LINE PLACEMENT  7/17/2020    IR NON-TUNNELED CENTRAL LINE PLACEMENT 8/14/2020    IR NON-TUNNELED CENTRAL LINE PLACEMENT  7/16/2021    LAPAROTOMY N/A 8/9/2020    Procedure: LAPAROTOMY EXPLORATORY, PARASTOMAL HERNIA REPAIR WITH MESH;  Surgeon: Ambika Powell DO;  [facility];  Service: General    SC ANASTOMOSIS,AV,ANY SITE Right 1/5/2021    Procedure: Creation of right brachiobasilic fistula; Surgeon: Melo Chairez MD;  [facility];  Service: Vascular    SC COLONOSCOPY FLX DX W/COLLJ SPEC WHEN PFRMD N/A 8/31/2016    Procedure: COLONOSCOPY;  Surgeon: Marielena Gandhi MD;  Location: [facility]; Service: Gastroenterology    SC CYSTOSCOPY,INSERT URETERAL STENT Bilateral 7/27/2016    Procedure: STENT INSERTION; EXCISION OF MESH ;  Surgeon: Odilia Benitez MD;  [facility];  Service: Urology    TONSILLECTOMY      TUBAL LIGATION      WISDOM TOOTH EXTRACTION          08/02/21 1150   OT Last Visit   OT Visit Date 08/02/21   Note Type   Note type Re-Evaluation   Restrictions/Precautions   Weight Bearing Precautions Per Order No   Other Precautions Contact/isolation; Airborne/isolation;Cognitive; Chair Alarm; Fall Risk;Pain  (COVID(+))   Pain Assessment   Pain Assessment Tool Pain Assessment not indicated - pt denies pain   Pain Score No Pain   Home Living   Additional Comments see initial OT eval   Prior Function   Comments see initial OT eval   Lifestyle   Autonomy I ADLS/IADLS, transfers and functional mobility PTA   Reciprocal Relationships Pt lives w/ her son who per EMR has autism and pt is primary caregiver; pt also has 2nd son who doesn't live w/ them   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; was a head    Intrinsic Gratification Pt reports no hobbies; per EMR enjoys reading, Confucianist and hanging w/ the neighbor friends   Psychosocial   Psychosocial (WDL) X   Patient Behaviors/Mood Not interactive;Flat affect   ADL   Eating Assistance 5  Supervision/Setup   Grooming Assistance 5  Supervision/Setup   UB Bathing Assistance 4  Minimal Assistance   LB Bathing Assistance 4  Minimal Assistance   UB Dressing Assistance 4  Minimal [address] 3  Moderate Assistance   Toileting Assistance  3  Moderate Assistance   Functional Assistance 4  Minimal Assistance   Functional Deficit Steadying;Verbal cueing;Supervision/safety; Increased time to complete   Bed Mobility   Supine to Sit Unable to assess   Sit to Supine Unable to assess   Additional Comments pt seated up OOB in chair prior to and end of session   Transfers   Sit to Stand 4  Minimal assistance   Additional items Assist x 1; Increased time required;Verbal cues   Stand to Sit 4  Minimal assistance   Additional items Assist x 1; Increased time required;Verbal cues   Additional Comments HHA used   Functional Mobility   Functional Mobility 4  Minimal assistance   Additional Comments Pt ambulated short in room distance w/ HHA, Min A for steadying balance  Pt on room air; O2 @ 97% prior to mvmt, dropped to 94% w/ activity  Reports feeling shortness of breath  Additional items Hand hold assistance   Balance   Static Sitting Fair +   Dynamic Sitting Fair   Static Standing Fair -   Dynamic Standing Poor +   Ambulatory Poor +   Activity Tolerance   Activity Tolerance Patient limited by fatigue   Nurse Made Aware yes   RUE Assessment   RUE Assessment WFL  (grossly 4/5; edema)   LUE Assessment   LUE Assessment WFL  (grossly 4/5)   Hand Function   Gross Motor Coordination Functional   Fine Motor Coordination Functional   Sensation   Light Touch No apparent deficits   Vision-Basic Assessment   Current Vision Wears glasses all the time   Cognition   Overall Cognitive Status Impaired   Arousal/Participation Responsive; Cooperative;Lethargic   Attention Attends with cues to redirect   Orientation Level Oriented X4   Memory Decreased recall of precautions   Following Commands Follows one step commands without difficulty   Comments Pt is cooperative; overall presents w/ flat affect, decreased understanding of deficts and limited motivation to participate  Assessment   Limitation Decreased ADL status; Decreased UE strength;Decreased Safe judgement during ADL;Decreased endurance;Decreased self-care trans;Decreased high-level ADLs; Decreased cognition   Prognosis Fair   Assessment Pt is a 76 y/o female seen for OT re-reval s/p expiration of OT goals  Pt initially admitted for generalized weakness, decreased oral intake and difficulty w/ ambulation and diarrhea for 1 week  Pt is dx'd w/ COVID-19 and febrile illness  Pt seen for initial OT evaluation on 7/16 and was performing @ a level of Mod A UB ADLS, Max A LB ADLS, Mod A bed mobility and was unable to perform transfers/functional mobility @ the time  Pt has transferred in/out of MICU since initial eval w/ need for increased O2 requirements  Now is on room air  Pt currently performing @ level of Min A UB ADLS, Min-Mod A LB ADLS, Min A transfers and functional mobility w/ HHA  Pt remains limited 2* fatigue, activity tolerance, endurance, forward functional reach, balance, functional mobility, strength, safety, and I w/ ADLS  Continue to recommend STR upon D/C, when medically stable  Pt continues to benefit from immediate inpatient skilled OT services 3-5x/wk  Will continue to follow to address goals stated below to be met within the next 10-14 days:   Goals   Patient Goals to feel better and go home   LTG Time Frame 10-14   Long Term Goal #1 see below listed goals   Plan   Treatment Interventions ADL retraining;Functional transfer training;UE strengthening/ROM; Endurance training;Cognitive reorientation;Patient/family training; Compensatory technique education;Continued evaluation; Energy conservation; Activityengagement   Goal Expiration Date 08/16/21   OT Frequency 3-5x/wk   Recommendation   OT Discharge Recommendation Post acute rehabilitation services   OT - OK to Discharge Yes  (when medically stable)   Additional Comments  The patient's raw score on the AM-PAC Daily Activity inpatient short form is 18, standardized score is 38 66, less than 39 4  Patients at this level are likely to benefit from discharge to post-acute rehabilitation services  Please refer to the recommendation of the Occupational Therapist for safe discharge planning     AM-PAC Daily Activity Inpatient   Lower Body Dressing 2   Bathing 3   Toileting 2   Upper Body Dressing 3   Grooming 4   Eating 4   Daily Activity Raw Score 18   Daily Activity Standardized Score (Calc for Raw Score >=11) 38 66   AM-PAC Applied Cognition Inpatient   Following a Speech/Presentation 3   Understanding Ordinary Conversation 4   Taking Medications 3   Remembering Where Things Are Placed or Put Away 3   Remembering List of 4-5 Errands 3   Taking Care of Complicated Tasks 2   Applied Cognition Raw Score 18   Applied Cognition Standardized Score 38 07       GOALS    1) Pt will improve activity tolerance to G for 30 min txment sessions for increase engagement in functional tasks  2) Pt will complete UB/LB dressing/self care w/ mod I using adaptive device and DME as needed  3) Pt will complete bathing w/ Mod I w/ use of AE and DME as needed  4) Pt will complete toileting w/ mod I w/ G hygiene/thoroughness using DME as needed  5) Pt will improve functional transfers to Mod I on/off all surfaces using DME as needed w/ G balance/safety   6) Pt will improve functional mobility during ADL/IADL/leisure tasks to Mod I using DME as needed w/ G balance/safety   7) Pt will participate in simulated IADL management task to increase independence to Mod I w/ G safety and endurance  8) Pt will be attentive 100% of the time during ongoing cognitive assessment w/ G participation to assist w/ safe d/c planning/recommendations  9) Pt will demonstrate G carryover of pt/caregiver education and training as appropriate w/o cues w/ good tolerance to increase safety during functional tasks  10) Pt will demonstrate 100% carryover of energy conservation techniques t/o functional I/ADL/leisure tasks w/o cues s/p skilled education to increase endurance during functional tasks       Doretha Jean MS, OTR/L

## 2021-08-02 NOTE — PLAN OF CARE
Problem: PHYSICAL THERAPY ADULT  Goal: Performs mobility at highest level of function for planned discharge setting  See evaluation for individualized goals  Description: Treatment/Interventions: Functional transfer training, LE strengthening/ROM, Therapeutic exercise, Elevations, Endurance training, Equipment eval/education, Bed mobility, Gait training, Spoke to nursing, Spoke to case management          See flowsheet documentation for full assessment, interventions and recommendations  Note: Prognosis: Good  Problem List: Decreased strength, Decreased range of motion, Decreased endurance, Impaired balance, Decreased mobility, Decreased safety awareness, Decreased cognition, Pain, Orthopedic restrictions  Assessment: Pt required increased encouragement to participate  Required A for transfers with decreased strength and balance  Demonstrated decreased foot clearance during ambulation  Poor activity tolerance with fatigue  Demonstrated ability to maintain SpO2 at 90% on RA with minimal activity this session  Pt will benefit from continued inpt skilled PT and rehab to maximize functional moblility & safety  Barriers to Discharge: Inaccessible home environment        PT Discharge Recommendation: Post acute rehabilitation services     PT - OK to Discharge: Yes    See flowsheet documentation for full assessment

## 2021-08-02 NOTE — ASSESSMENT & PLAN NOTE
Lab Results   Component Value Date    EGFR 13 08/02/2021    EGFR 13 08/01/2021    EGFR 13 07/31/2021    CREATININE 3 27 (H) 08/02/2021    CREATININE 3 28 (H) 08/01/2021    CREATININE 3 32 (H) 07/31/2021   · on CKD stg V, secondary to ATN due to covid infection  · Secondary to sepCr baseline 3 4-3 8, follows with Dr Angelina Hartman  · Nephrology following   · Prn dosing of IV lasix per fluid status monitoring  · Has R AVF in place: dopplers, no issue edema noted  · No indication for HD at this time  · On bicarb tablets, calcitrol

## 2021-08-02 NOTE — PROGRESS NOTES
NEPHROLOGY PROGRESS NOTE   Kavya Montalvo 77 y o  female MRN: [id number]  Unit/Bed#: -01 Encounter: [id number]  Reason for Consult: LUANN/CKD    ASSESSMENT AND PLAN:  LUANN on CKD stage 5, baseline creatinine low to mid threes, follows with Dr Nisreen Gandhi  -peak creatinine level 4 9 now has improved and 3 2 at baseline today  -renal function overall stable  No need for dialysis  -continue to closely monitor, avoid nephrotoxins or NSAIDs  -avoid hypotension    Azotemia, closely monitor for uremic symptoms   -suspect in the setting of steroids, advanced renal failure    CKD BMD, hyperphosphatemia  -continue to closely monitor, renal diet  -currently on p o  Calcitriol, vitamin-D  Right upper extremity AV fistula  -status post venous Doppler, no DVT, av fistula patent  Hypoxic respiratory failure, COVID-19 infection, management as per primary team   Currently on room air at the time of my encounter  Seems to be slowly improving   -can use p r n  Diuretics  CKD anemia, patient with history of polycythemia  -continue to monitor hemoglobin, transfuse p r n  Hemoglobin less than seven  Bicarb level has now much improved 25, currently on bicarb supplement, reduce dose one tablet p o  B i d  Blood pressure acceptable, avoid relative hypotension  Continue to closely monitor  Discussed above plan in detail with primary team    SUBJECTIVE:  Patient seen and examined at bedside  No chest pain, denies worsening shortness of breath, nausea, vomiting  Currently remains on room air at the time of my encounter      OBJECTIVE:  Current Weight: Weight - Scale: 88 kg (194 lb 0 1 oz)  Vitals:    08/01/21 2215   BP: 118/67   Pulse: 66   Resp: 16   Temp: 98 9 °F (37 2 °C)   SpO2: 92%       Intake/Output Summary (Last 24 hours) at 8/2/2021 0920  Last data filed at 8/2/2021 0510  Gross per 24 hour   Intake 480 ml   Output 980 ml   Net -500 ml     Wt Readings from Last 3 Encounters:   07/30/21 88 kg (194 lb 0 1 oz) 07/08/21 88 kg (194 lb)   07/07/21 88 9 kg (196 lb)     Temp Readings from Last 3 Encounters:   08/01/21 98 9 °F (37 2 °C) (Oral)   07/08/21 99 °F (37 2 °C) (Tympanic)   06/21/21 (!) 97 3 °F (36 3 °C) (Temporal)     BP Readings from Last 3 Encounters:   08/01/21 118/67   07/08/21 124/74   07/07/21 136/82     Pulse Readings from Last 3 Encounters:   08/01/21 66   07/08/21 79   07/07/21 63        Physical Examination:  General:  Lying in bed, no acute distress   Eyes:  Mild conjunctival pallor present  ENT:  External examination of ears and nose unremarkable  Neck:  No obvious lymphadenopathy appreciated  Respiratory:  Bilateral air entry present  CVS:  S1, S2 present  GI:  Soft, nondistended  CNS:  Active alert oriented x3  Skin:  No new rash in legs  Musculoskeletal:  No obvious new gross deformity noted    Medications:    Current Facility-Administered Medications:     acetaminophen (TYLENOL) tablet 650 mg, 650 mg, Oral, Q4H PRN, [facility]    albuterol (PROVENTIL HFA,VENTOLIN HFA) inhaler 2 puff, 2 puff, Inhalation, Q4H PRN, [facility]    apixaban (ELIQUIS) tablet 2 5 mg, 2 5 mg, Oral, BID, [facility] CHANG Delarosa, PA-C, 2 5 mg at 08/02/21 3326    atorvastatin (LIPITOR) tablet 40 mg, 40 mg, Oral, Daily With Dinner, [facility] CHANG Delarosa PA-C, 40 mg at 08/01/21 1809    calcitriol (ROCALTROL) capsule 0 25 mcg, 0 25 mcg, Oral, Daily, [facility] CHANG Delarosa PA-C, 0 25 mcg at 08/01/21 3752    cholecalciferol (VITAMIN D3) tablet 2,000 Units, 2,000 Units, Oral, Daily, [facility], 2,000 Units at 08/02/21 0904    citalopram (CeleXA) tablet 20 mg, 20 mg, Oral, Daily, [facility] CHANG Delarosa PA-C, 20 mg at 08/01/21 0902    dexamethasone (DECADRON) injection 4 mg, 4 mg, Intravenous, Q12H [address], Francisco Delarosa PA-C, 4 mg at 08/02/21 0909    famotidine (PEPCID) tablet 20 mg, 20 mg, Oral, Daily, Francisco Delarosa PA-C, 20 mg at 08/02/21 0904    levothyroxine tablet 75 mcg, 75 mcg, Oral, Daily, Denisse Fournier Dierdorff, PA-C, 75 mcg at 08/02/21 0501    melatonin tablet 6 mg, 6 mg, Oral, HS, [facility], 6 mg at 08/01/21 2215    metoprolol tartrate (LOPRESSOR) tablet 25 mg, 25 mg, Oral, BID, [facility] CHANG Dierdorff, PA-C, 25 mg at 08/02/21 7778    [COMPLETED] zinc sulfate (ZINCATE) capsule 220 mg, 220 mg, Oral, Daily, 220 mg at 07/21/21 0859 **FOLLOWED BY** multivitamin-minerals (CENTRUM ADULTS) tablet 1 tablet, 1 tablet, Oral, Daily, [facility] CHANG Dierdorff, PA-C, 1 tablet at 08/02/21 0904    ondansetron (ZOFRAN) injection 4 mg, 4 mg, Intravenous, Q4H PRN, [facility] CHANG Dierdorff, PA-C, 4 mg at 07/17/21 0845    polyethylene glycol (MIRALAX) packet 17 g, 17 g, Oral, Daily, [facility] CHANG Dierdorff, PA-C, 17 g at 08/02/21 7855    ruxolitinib (JAKAFI) tablet 10 mg, 10 mg, Oral, Every Other Day, Denisse Fournier Dierdorff, PA-C, 10 mg at 08/01/21 4886    saccharomyces boulardii (FLORASTOR) capsule 250 mg, 250 mg, Oral, BID, [facility] CHANG Dierdorff, PA-C, 250 mg at 08/02/21 0913    senna-docusate sodium (SENOKOT S) 8 6-50 mg per tablet 2 tablet, 2 tablet, Oral, HS, [facility], [facility], 2 tablet at 08/01/21 2215    sodium bicarbonate tablet 1,300 mg, 1,300 mg, Oral, BID after meals, Denisse Griers Dierdorff, PA-C, 1,300 mg at 08/02/21 3671    Laboratory Results:  Results from last 7 days   Lab Units 08/02/21  0509 08/01/21  0508 07/31/21  1730 07/31/21  0621 07/30/21  1833 07/30/21  0507 07/29/21  0607 07/29/21  0442 07/28/21  0545 07/27/21  0601   WBC Thousand/uL 6 52 8 10  --  8 48  --   --  8 85  --  10 45* 9 73   HEMOGLOBIN g/dL 7 9* 8 1*  --  8 3*  --   --  8 0*  --  8 7* 8 7*   HEMATOCRIT % 25 0* 25 4*  --  26 2*  --   --  25 8*  --  26 8* 26 7*   PLATELETS Thousands/uL 183 193  --  233  --   --  261  --  301 325   SODIUM mmol/L 136 138  --  137  --  135*  --  137 136 138   POTASSIUM mmol/L 4 9 5 2 5 1 5 8* 5 2 5 8*  --  5 6* 4 9 5 0   CHLORIDE mmol/L 105 105  --  105  --  105  --  105 104 102   CO2 mmol/L 25 24 --  24  --  25  --  24 25 26   BUN mg/dL 97* 94*  --  103*  --  95*  --  96* 89* 90*   CREATININE mg/dL 3 27* 3 28*  --  3 32*  --  3 55*  --  3 29* 3 26* 3 42*   CALCIUM mg/dL 8 0* 8 2*  --  8 4  --  8 3  --  8 5 8 4 7 9*   MAGNESIUM mg/dL 1 9 2 0  --  2 3  --   --   --   --   --   --    PHOSPHORUS mg/dL 5 4* 5 6*  --  5 8*  --  4 6*  --   --   --   --        VAS upper limb venous duplex scan, unilateral/limited   Final Result by Katelyn Zuniga MD (07/27 1720)      IR non-tunneled central line placement   Final Result by Niharika Stanley DO (07/16 1935)   Impression: Successful placement of a central venous catheter via the right external jugular vein  Workstation performed: [id number]         XR chest 1 view portable   Final Result by Ela Garcia MD (07/16 8634)      Question mild bilateral groundglass opacity in the upper lungs  If this is a real finding, it could be due to Covid-19 pneumonia  Workstation performed: [id number]             Portions of the record may have been created with voice recognition software  Occasional wrong word or "sound a like" substitutions may have occurred due to the inherent limitations of voice recognition software  Read the chart carefully and recognize, using context, where substitutions have occurred

## 2021-08-02 NOTE — PROGRESS NOTES
[address] [facility]  Progress Note - Laura Rivera [id number], 77 y o  female MRN: [id number]  Unit/Bed#: -01 Encounter: [id number]  Primary Care Provider: Richard Campbell MD   Date and time admitted to hospital: 7/15/2021 10:52 AM    Hyperkalemia  Assessment & Plan  Continue to monitor; low-potassium diet  Started on Kayexalate on 07/31; asymptomatic    Edema of right upper extremity  Assessment & Plan  Chronic as + AVF in place but more increased than usual as also confirmed per nephrology  Check upper extremity doppler-negative  Already on heparin gtt  Improving    Acute respiratory failure with hypoxia ([address] )  Assessment & Plan  · In the setting of COVID-19 as above  · Plan as per above  · Now saturating well on 2 L; much improved  · 8/2 - resolved    Constipation  Assessment & Plan  Patient is having constipation, due probably to immobility and medications  Continue with senna and colace, miralax and dulcolax suppository was ordered  Monitor bm     Class 2 severe obesity due to excess calories with serious comorbidity and body mass index (BMI) of 35 0 to 35 9 in adult [facility])  Assessment & Plan  Body mass index is 37 89 kg/m²  · Nutrition consult once improved from above    History of Clostridioides difficile colitis  Assessment & Plan  · Diarrhea likely secondary to covid 19 infection; resolved  · Noted history of C  Diff  · C  Diff testing was negative on admission, however patient did received IV abx   Received PO Vanco for prophylaxis for 3 days post last dose of IV abx    Acute renal failure superimposed on stage 5 chronic kidney disease, not on chronic dialysis [facility])  Assessment & Plan  Lab Results   Component Value Date    EGFR 13 08/02/2021    EGFR 13 08/01/2021    EGFR 13 07/31/2021    CREATININE 3 27 (H) 08/02/2021    CREATININE 3 28 (H) 08/01/2021    CREATININE 3 32 (H) 07/31/2021   · on CKD stg V, secondary to ATN due to covid infection  · Secondary to sepCr baseline 3 4-3 8, follows with Dr Silke Crain  · Nephrology following   · Prn dosing of IV lasix per fluid status monitoring  · Has R AVF in place: dopplers, no issue edema noted  · No indication for HD at this time  · On bicarb tablets, calcitrol      Anemia in CKD (chronic kidney disease)  Assessment & Plan  Lab Results   Component Value Date    HGB 7 9 (L) 08/02/2021    HGB 8 1 (L) 08/01/2021    HGB 8 3 (L) 07/31/2021   · Chronic, no sign of acute blood loss  · Cont to monitor, transfuse if < 7     Polycythemia vera ([facility] [facility] )  Assessment & Plan  · Patient follows up with outpatient Hematology Oncology, currently on Jakafi     Obstructive uropathy  Assessment & Plan  · S/p ileostomy for nonfunctioning bladder and chronic hydro  · OP urology follow up    Chronic saddle pulmonary embolism ([facility] [address] )  Assessment & Plan  · On apixaban 2 5 mg b i d ; overall respiratory status improving      * COVID-19 virus infection  Assessment & Plan  · Patient presented with fever, generalized weakness, diarrhea decreasing oral intake and difficulty with ambulation for 1 week per admission record review  · COVID-19 pneumonia, unfortunately unvaccinated  · S/p completion of remdesivir x 5 days  · On increased dose of dexamethasone 8mg bid, on gi prophylaxis   · Abx dcd given low procal x 2  · Worsening d-dimer thus recommended per pulmonary to d/c eliquis and start on heparin gtt: D dimer is improving, trending still going down tomorrow, will place her back to eliquis  · Still on 15 liters mid flow   · Now on IV lasix 60mg every other day  · Cont to trend d-dimer  · Remains on stepdown - low threshold to involve critical care   · Respiratory protocol     7/29-overall respiratory status improving; continue present therapy  Currently on 6 L nasal cannula  Patient followed by Nephrology and renal function stable  Started low potassium diet; step-down to med surge  Continue isolation for additional 5 days      7/30-respiratory status improved significantly, currently on 2 L nasal cannula  Renal function, however, not improving as anticipated  Appreciate Nephrology recommendations  Low potassium diet; no need for dialysis  Will continue to monitor on morning labs and potassium lab tonight at 6:00 p m       -respiratory status remains stable; patient requiring 2 L  Have discussed with Nephrology, renal status is stable and at baseline  Potassium levels improved on Kayexalate; monitor and re-initiate as needed  VTE Pharmacologic Prophylaxis:   Pharmacologic: Apixaban (Eliquis)  Mechanical VTE Prophylaxis in Place: Yes    Patient Centered Rounds: I have performed bedside rounds with nursing staff today  Discussions with Specialists or Other Care Team Provider:  Nephrology    Education and Discussions with Family / Patient: Discussed treatment plan with family and patient who agree with current plan; encouraged to ask questions and participate  Time Spent for Care: 45 minutes  More than 50% of total time spent on counseling and coordination of care as described above  Current Length of Stay: 18 day(s)    Current Patient Status: Inpatient   Certification Statement: The patient will continue to require additional inpatient hospital stay due to Treatment of COVID pneumonia    Discharge Plan: To be determined    Code Status: Level 1 - Full Code      Subjective:   Patient seen and examined bedside, no acute distress or discomfort noted  Patient has been here for 18 days for treatment of COVID pneumonia  Currently her respiratory and renal status are baseline and patient is stable for discharge home  Will need an additional 2 days of isolation  On Eliquis for history of PE and statin therapy already; no other therapies needed moving forward    Tragically, 2 days ago, her son  from [employer] [place] [facility]"" and attempted to discharge patient home so that she could participate in planning her son's ; unfortunately, however, her other son has COVID and is unable to host his mother  She qualifies for rehab and plan is for discharge to [address] [place] [address] when bed available  Objective:     Vitals:   Temp (24hrs), Av 8 °F (37 1 °C), Min:98 4 °F (36 9 °C), Max:99 °F (37 2 °C)    Temp:  [98 4 °F (36 9 °C)-99 °F (37 2 °C)] 99 °F (37 2 °C)  HR:  [66-97] 80  Resp:  [16-18] 16  BP: (117-141)/(52-85) 141/85  SpO2:  [92 %-97 %] 96 %  Body mass index is 37 89 kg/m²  Input and Output Summary (last 24 hours): Intake/Output Summary (Last 24 hours) at 2021 1105  Last data filed at 2021 0946  Gross per 24 hour   Intake 660 ml   Output 980 ml   Net -320 ml       Physical Exam:     Physical Exam  Vitals and nursing note reviewed  Constitutional:       General: She is not in acute distress  Appearance: She is well-developed  HENT:      Head: Normocephalic and atraumatic  Eyes:      Conjunctiva/sclera: Conjunctivae normal    Cardiovascular:      Rate and Rhythm: Normal rate and regular rhythm  Heart sounds: No murmur heard  Pulmonary:      Effort: Pulmonary effort is normal  No respiratory distress  Comments: Reduced breath sounds  Abdominal:      Palpations: Abdomen is soft  Tenderness: There is no abdominal tenderness  Musculoskeletal:      Cervical back: Neck supple  Comments: Edema right> left upper extremity   Skin:     General: Skin is warm and dry  Neurological:      Mental Status: She is alert and oriented to person, place, and time     Psychiatric:         Behavior: Behavior normal       Comments: Depressed mood           Additional Data:     Labs:    Results from last 7 days   Lab Units 21  0509   WBC Thousand/uL 6 52   HEMOGLOBIN g/dL 7 9*   HEMATOCRIT % 25 0*   PLATELETS Thousands/uL 183   NEUTROS PCT % 82*   LYMPHS PCT % 10*   MONOS PCT % 7   EOS PCT % 0     Results from last 7 days   Lab Units 21  0509   SODIUM mmol/L 136   POTASSIUM mmol/L 4 9   CHLORIDE mmol/L 105   CO2 mmol/L 25   BUN mg/dL 97*   CREATININE mg/dL 3 27*   ANION GAP mmol/L 6   CALCIUM mg/dL 8 0*   ALBUMIN g/dL 2 2*   TOTAL BILIRUBIN mg/dL 0 40   ALK PHOS U/L 54   ALT U/L 13   AST U/L 18   GLUCOSE RANDOM mg/dL 112         Results from last 7 days   Lab Units 07/30/21  0710   POC GLUCOSE mg/dl 182*                   * I Have Reviewed All Lab Data Listed Above  * Additional Pertinent Lab Tests Reviewed:  All Labs Within Last 24 Hours Reviewed    Imaging:    Imaging Reports Reviewed Today Include:   Imaging Personally Reviewed by Myself Includes:      Recent Cultures (last 7 days):           Last 24 Hours Medication List:   Current Facility-Administered Medications   Medication Dose Route Frequency Provider Last Rate    acetaminophen  650 mg Oral Q4H PRN Day Troncoso PA-C      albuterol  2 puff Inhalation Q4H PRN Day Troncoso PA-C      apixaban  2 5 mg Oral BID Day Troncoso PA-C      atorvastatin  40 mg Oral Daily With [employer] D DIMITRI Delarosa      calcitriol  0 25 mcg Oral Daily Day Troncoso PA-C      cholecalciferol  2,000 Units Oral Daily [place] D DIMITRI Delarosa      citalopram  20 mg Oral Daily [place] D DierdorfDIMITRI eddy      dexamethasone  4 mg Intravenous Q12H [address] [place] D DIMITRI Delarosa      famotidine  20 mg Oral Daily [place] D DierdorfDIMITRI eddy      levothyroxine  75 mcg Oral Daily [place] D DierdorfDIMITRI eddy      melatonin  6 mg Oral HS [place] D DIMITRI Delarosa      metoprolol tartrate  25 mg Oral BID Day Troncoso PA-C      multivitamin-minerals  1 tablet Oral Daily [place] D DierdDIMITRI hawkins      ondansetron  4 mg Intravenous Q4H PRN [place] D DIMITRI Delarosa      polyethylene glycol  17 g Oral Daily [place] D DIMITRI Delarosa      ruxolitinib  10 mg Oral Every Other Day [place] D JeanninerdorfDIMITRI eddy      saccharomyces boulardii  250 mg Oral BID Day Troncoso PA-C      senna-docusate sodium  2 tablet Oral HS [place] D DIMITRI Delarosa      sodium bicarbonate  650 mg Oral BID after meals Rod Harris MD          Today, Patient Was Seen By: Homer Flores MD    ** Please Note: Dictation voice to text software may have been used in the creation of this document   **

## 2021-08-02 NOTE — PLAN OF CARE
Problem: OCCUPATIONAL THERAPY ADULT  Goal: Performs self-care activities at highest level of function for planned discharge setting  See evaluation for individualized goals  Description: Treatment Interventions: ADL retraining, Functional transfer training, UE strengthening/ROM, Endurance training, Cognitive reorientation, Patient/family training, Equipment evaluation/education, Compensatory technique education, Continued evaluation, Energy conservation, Activityengagement          See flowsheet documentation for full assessment, interventions and recommendations  8/2/2021 1200 by Imelda Loving OT  Note: Limitation: Decreased ADL status, Decreased UE strength, Decreased Safe judgement during ADL, Decreased endurance, Decreased self-care trans, Decreased high-level ADLs, Decreased cognition  Prognosis: Fair  Assessment: Pt is a 78 y/o female seen for OT re-reval s/p expiration of OT goals  Pt initially admitted for generalized weakness, decreased oral intake and difficulty w/ ambulation and diarrhea for 1 week  Pt is dx'd w/ COVID-19 and febrile illness  Pt seen for initial OT evaluation on 7/16 and was performing @ a level of Mod A UB ADLS, Max A LB ADLS, Mod A bed mobility and was unable to perform transfers/functional mobility @ the time  Pt has transferred in/out of MICU since initial eval w/ need for increased O2 requirements  Now is on room air  Pt currently performing @ level of Min A UB ADLS, Min-Mod A LB ADLS, Min A transfers and functional mobility w/ HHA  Pt remains limited 2* fatigue, activity tolerance, endurance, forward functional reach, balance, functional mobility, strength, safety, and I w/ ADLS  Continue to recommend STR upon D/C, when medically stable  Pt continues to benefit from immediate inpatient skilled OT services 3-5x/wk   Will continue to follow to address goals stated below to be met within the next 10-14 days:     OT Discharge Recommendation: Post acute rehabilitation services  OT - OK to Discharge: Yes (when medically stable)    8/2/2021 1200 by Veronica Nguyen OT  Note: Limitation: Decreased ADL status, Decreased UE strength, Decreased Safe judgement during ADL, Decreased endurance, Decreased self-care trans, Decreased high-level ADLs, Decreased cognition  Prognosis: Fair  Assessment: Pt is a 76 y/o female seen for OT re-reval s/p expiration of OT goals  Pt initially admitted for generalized weakness, decreased oral intake and difficulty w/ ambulation and diarrhea for 1 week  Pt is dx'd w/ COVID-19 and febrile illness  Pt seen for initial OT evaluation on 7/16 and was performing @ a level of Mod A UB ADLS, Max A LB ADLS, Mod A bed mobility and was unable to perform transfers/functional mobility @ the time  Pt has transferred in/out of MICU since initial eval w/ need for increased O2 requirements  Now is on room air  Pt currently performing @ level of Min A UB ADLS, Min-Mod A LB ADLS, Min A transfers and functional mobility w/ HHA  Pt remains limited 2* fatigue, activity tolerance, endurance, forward functional reach, balance, functional mobility, strength, safety, and I w/ ADLS  Continue to recommend STR upon D/C, when medically stable  Pt continues to benefit from immediate inpatient skilled OT services 3-5x/wk   Will continue to follow to address goals stated below to be met within the next 10-14 days:     OT Discharge Recommendation: Post acute rehabilitation services  OT - OK to Discharge: Yes (when medically stable)     Veronica Nguyen MS, OTR/L

## 2021-08-02 NOTE — CASE MANAGEMENT
CM was informed by Dr Davida Jain that pt is medically clear for dc today  CM informed PURNIMA Maguire admission liaison of the above  Pt was accepted by [facility] pending auth  CM will follow

## 2021-08-02 NOTE — ASSESSMENT & PLAN NOTE
Lab Results   Component Value Date    HGB 7 9 (L) 08/02/2021    HGB 8 1 (L) 08/01/2021    HGB 8 3 (L) 07/31/2021   · Chronic, no sign of acute blood loss  · Cont to monitor, transfuse if < 7

## 2021-08-03 ENCOUNTER — TELEPHONE (OUTPATIENT)
Dept: OTHER | Facility: OTHER | Age: 76
End: 2021-08-03

## 2021-08-03 VITALS
OXYGEN SATURATION: 86 % | HEART RATE: 80 BPM | BODY MASS INDEX: 38.09 KG/M2 | DIASTOLIC BLOOD PRESSURE: 64 MMHG | RESPIRATION RATE: 18 BRPM | SYSTOLIC BLOOD PRESSURE: 142 MMHG | TEMPERATURE: 98.4 F | HEIGHT: 60 IN | WEIGHT: 194 LBS

## 2021-08-03 PROBLEM — J96.01 ACUTE RESPIRATORY FAILURE WITH HYPOXIA (HCC): Status: RESOLVED | Noted: 2021-07-19 | Resolved: 2021-08-03

## 2021-08-03 PROBLEM — E87.5 HYPERKALEMIA: Status: RESOLVED | Noted: 2021-07-31 | Resolved: 2021-08-03

## 2021-08-03 LAB
ANION GAP SERPL CALCULATED.3IONS-SCNC: 4 MMOL/L (ref 4–13)
BUN SERPL-MCNC: 98 MG/DL (ref 5–25)
CALCIUM SERPL-MCNC: 8.2 MG/DL (ref 8.3–10.1)
CHLORIDE SERPL-SCNC: 106 MMOL/L (ref 100–108)
CO2 SERPL-SCNC: 24 MMOL/L (ref 21–32)
CREAT SERPL-MCNC: 3.17 MG/DL (ref 0.6–1.3)
GFR SERPL CREATININE-BSD FRML MDRD: 14 ML/MIN/1.73SQ M
GLUCOSE SERPL-MCNC: 86 MG/DL (ref 65–140)
POTASSIUM SERPL-SCNC: 4.4 MMOL/L (ref 3.5–5.3)
SODIUM SERPL-SCNC: 134 MMOL/L (ref 136–145)

## 2021-08-03 PROCEDURE — 99232 SBSQ HOSP IP/OBS MODERATE 35: CPT | Performed by: INTERNAL MEDICINE

## 2021-08-03 PROCEDURE — 99239 HOSP IP/OBS DSCHRG MGMT >30: CPT | Performed by: INTERNAL MEDICINE

## 2021-08-03 PROCEDURE — 80048 BASIC METABOLIC PNL TOTAL CA: CPT | Performed by: INTERNAL MEDICINE

## 2021-08-03 RX ORDER — ATORVASTATIN CALCIUM 40 MG/1
40 TABLET, FILM COATED ORAL
Start: 2021-08-03 | End: 2021-08-19 | Stop reason: SDUPTHER

## 2021-08-03 RX ORDER — PREDNISONE 20 MG/1
TABLET ORAL
Qty: 25 TABLET | Refills: 0
Start: 2021-08-03 | End: 2021-08-19 | Stop reason: SDUPTHER

## 2021-08-03 RX ORDER — SODIUM BICARBONATE 650 MG/1
650 TABLET ORAL
Refills: 0
Start: 2021-08-03 | End: 2021-08-19 | Stop reason: SDUPTHER

## 2021-08-03 RX ADMIN — LEVOTHYROXINE SODIUM 75 MCG: 75 TABLET ORAL at 05:43

## 2021-08-03 RX ADMIN — PREDNISONE 40 MG: 20 TABLET ORAL at 09:59

## 2021-08-03 RX ADMIN — Medication 2000 UNITS: at 09:59

## 2021-08-03 RX ADMIN — SODIUM BICARBONATE 650 MG TABLET 650 MG: at 10:00

## 2021-08-03 RX ADMIN — METOPROLOL TARTRATE 25 MG: 25 TABLET, FILM COATED ORAL at 09:59

## 2021-08-03 RX ADMIN — APIXABAN 2.5 MG: 2.5 TABLET, FILM COATED ORAL at 17:45

## 2021-08-03 RX ADMIN — SODIUM BICARBONATE 650 MG TABLET 650 MG: at 17:45

## 2021-08-03 RX ADMIN — CITALOPRAM HYDROBROMIDE 20 MG: 20 TABLET ORAL at 09:59

## 2021-08-03 RX ADMIN — CALCITRIOL CAPSULES 0.25 MCG 0.25 MCG: 0.25 CAPSULE ORAL at 09:59

## 2021-08-03 RX ADMIN — METOPROLOL TARTRATE 25 MG: 25 TABLET, FILM COATED ORAL at 17:45

## 2021-08-03 RX ADMIN — RUXOLITINIB 10 MG: 10 TABLET ORAL at 10:02

## 2021-08-03 RX ADMIN — APIXABAN 2.5 MG: 2.5 TABLET, FILM COATED ORAL at 09:59

## 2021-08-03 RX ADMIN — ATORVASTATIN CALCIUM 40 MG: 40 TABLET, FILM COATED ORAL at 17:45

## 2021-08-03 RX ADMIN — MULTIPLE VITAMINS W/ MINERALS TAB 1 TABLET: TAB ORAL at 09:59

## 2021-08-03 RX ADMIN — Medication 250 MG: at 09:59

## 2021-08-03 RX ADMIN — FAMOTIDINE 20 MG: 20 TABLET ORAL at 09:59

## 2021-08-03 RX ADMIN — Medication 250 MG: at 17:45

## 2021-08-03 NOTE — CASE MANAGEMENT
Transportation auth acquired form [facility]- [id number]  CM called [facility] and informed of such  CM received a message form Krystal Larson with [facility] informing that pt's transportation p/u time was changed to 19:30 with tere Gonzales RN aware and will inform pt

## 2021-08-03 NOTE — PROGRESS NOTES
NEPHROLOGY PROGRESS NOTE   Eliu Khanna 77 y o  female MRN: [id number]  Unit/Bed#: -01 Encounter: [id number]  Reason for Consult: LUANN CKD    ASSESSMENT AND PLAN:  LUANN on CKD stage 5, baseline creatinine low to mid 3s, follows with Dr Henrique Yeung  -peak creatinine level 4 9 now has improved and 3 1 at baseline today   -renal function overall stable  No need for dialysis  -continue to closely monitor, avoid nephrotoxins or NSAIDs  -avoid hypotension     Azotemia, no significant uremic symptoms   -suspect in the setting of steroids, advanced renal failure     CKD BMD, hyperphosphatemia  -continue to closely monitor, renal diet  -currently on p o  Calcitriol, vitamin-D      Right upper extremity AV fistula  -status post venous Doppler, no DVT, av fistula patent      Hypoxic respiratory failure, COVID-19 infection, management as per primary team   Currently on room air at the time of my encounter  Seems to be slowly improving   -can use p r n  Diuretics      CKD anemia, patient with history of polycythemia  -continue to monitor hemoglobin, transfuse p r n  Hemoglobin less than seven      Status post ileal conduit for nonfunctional bladder, follows with Urology as outpatient  Bicarb level remains stable 24, bicarb supplement was reduced to one tablet p o  B i d  Yesterday      Blood pressure acceptable, avoid relative hypotension  Continue to closely monitor  Discussed above plan in detail with primary team    SUBJECTIVE:  Patient seen and examined at bedside   No chest pain, shortness of breath, nausea, vomiting, abdominal pain    OBJECTIVE:  Current Weight: Weight - Scale: 88 kg (194 lb 0 1 oz)  Vitals:    08/03/21 0700   BP:    Pulse:    Resp:    Temp: 98 °F (36 7 °C)   SpO2:        Intake/Output Summary (Last 24 hours) at 8/3/2021 0752  Last data filed at 8/2/2021 1807  Gross per 24 hour   Intake 360 ml   Output 500 ml   Net -140 ml     Wt Readings from Last 3 Encounters:   07/30/21 88 kg (194 lb 0 1 oz)   07/08/21 88 kg (194 lb)   07/07/21 88 9 kg (196 lb)     Temp Readings from Last 3 Encounters:   08/03/21 98 °F (36 7 °C) (Oral)   07/08/21 99 °F (37 2 °C) (Tympanic)   06/21/21 (!) 97 3 °F (36 3 °C) (Temporal)     BP Readings from Last 3 Encounters:   08/02/21 136/76   07/08/21 124/74   07/07/21 136/82     Pulse Readings from Last 3 Encounters:   08/02/21 71   07/08/21 79   07/07/21 63        Physical Examination:  General:  Lying in bed, no acute distress   Eyes:  Mild conjunctival pallor present  ENT:  External examination of ears and nose unremarkable  Neck:  No obvious lymphadenopathy appreciated  Respiratory:  Bilateral air entry present  CVS:  S1, S2 present  GI:  Soft, nondistended, ileal conduit present  CNS:  Active alert oriented x3  Skin:  no new rash in legs  Musculoskeletal:  No obvious new gross deformity noted    Medications:    Current Facility-Administered Medications:     acetaminophen (TYLENOL) tablet 650 mg, 650 mg, Oral, Q4H PRN, Francisco Delarosa PA-C    albuterol (PROVENTIL HFA,VENTOLIN HFA) inhaler 2 puff, 2 puff, Inhalation, Q4H PRN, Francisco Delarosa, PA-C    apixaban (ELIQUIS) tablet 2 5 mg, 2 5 mg, Oral, BID, [facility], PA-[facility], 2 5 mg at 08/02/21 1748    atorvastatin (LIPITOR) tablet 40 mg, 40 mg, Oral, Daily With Dinner, [facility] Del[facility] PA-C, 40 mg at 08/02/21 1748    calcitriol (ROCALTROL) capsule 0 25 mcg, 0 25 mcg, Oral, Daily, [facility] CHANG Delarosa PA-C, 0 25 mcg at 08/02/21 1103    cholecalciferol (VITAMIN D3) tablet 2,000 Units, 2,000 Units, Oral, Daily, Britt PA-C, 2,000 Units at 08/02/21 0904    citalopram (CeleXA) tablet 20 mg, 20 mg, Oral, Daily, Francisco Delarosa PA-C, 20 mg at 08/02/21 6471    famotidine (PEPCID) tablet 20 mg, 20 mg, Oral, Daily, Francisco Delarosa PA-C, 20 mg at 08/02/21 2614    levothyroxine tablet 75 mcg, 75 mcg, Oral, Daily, Francisco Delarosa PA-C, 75 mcg at 08/03/21 0543    melatonin tablet 6 mg, 6 mg, Oral, HS, [facility] Dierdorff, PA-C, 6 mg at 08/02/21 2130    metoprolol tartrate (LOPRESSOR) tablet 25 mg, 25 mg, Oral, BID, [facility] CHANG Dierdorff, PA-C, 25 mg at 08/02/21 1748    [COMPLETED] zinc sulfate (ZINCATE) capsule 220 mg, 220 mg, Oral, Daily, 220 mg at 07/21/21 0859 **FOLLOWED BY** multivitamin-minerals (CENTRUM ADULTS) tablet 1 tablet, 1 tablet, Oral, Daily, [facility] CHANG Dierdorff, PA-C, 1 tablet at 08/02/21 0904    ondansetron (ZOFRAN) injection 4 mg, 4 mg, Intravenous, Q4H PRN, [facility] CHANG Dierdorff, PA-C, 4 mg at 07/17/21 0845    polyethylene glycol (MIRALAX) packet 17 g, 17 g, Oral, Daily, [facility] CHANG Dierdorff, PA-C, 17 g at 08/02/21 4819    predniSONE tablet 40 mg, 40 mg, Oral, Daily, Nikita Hagan MD    ruxolitinib (JAKAFI) tablet 10 mg, 10 mg, Oral, Every Other Day, [facility] Diemarisela, PA-C, 10 mg at 08/01/21 1149    saccharomyces boulardii (FLORASTOR) capsule 250 mg, 250 mg, Oral, BID, [facility] CHANG Dierdorff, PA-C, 250 mg at 08/02/21 1750    senna-docusate sodium (SENOKOT S) 8 6-50 mg per tablet 2 tablet, 2 tablet, Oral, HS, [facility] CHANG Dierdorff, PA-C, 2 tablet at 08/01/21 2215    sodium bicarbonate tablet 650 mg, 650 mg, Oral, BID after meals, Osiris Buchanan MD, 650 mg at 08/02/21 1656    Laboratory Results:  Results from last 7 days   Lab Units 08/03/21  0641 08/02/21  0509 08/01/21  0508 07/31/21  1730 07/31/21  0621 07/30/21  1833 07/30/21  0507 07/29/21  0607 07/29/21  0442 07/28/21  0545   WBC Thousand/uL  --  6 52 8 10  --  8 48  --   --  8 85  --  10 45*   HEMOGLOBIN g/dL  --  7 9* 8 1*  --  8 3*  --   --  8 0*  --  8 7*   HEMATOCRIT %  --  25 0* 25 4*  --  26 2*  --   --  25 8*  --  26 8*   PLATELETS Thousands/uL  --  183 193  --  233  --   --  261  --  301   SODIUM mmol/L 134* 136 138  --  137  --  135*  --  137 136   POTASSIUM mmol/L 4 4 4 9 5 2 5 1 5 8* 5 2 5 8*  --  5 6* 4 9   CHLORIDE mmol/L 106 105 105  --  105  --  105  --  105 104   CO2 mmol/L 24 25 24  --  24  --  25  --  24 25   BUN mg/dL 98* 97* 94*  --  103*  --  95*  --  96* 89*   CREATININE mg/dL 3 17* 3 27* 3 28*  --  3 32*  --  3 55*  --  3 29* 3 26*   CALCIUM mg/dL 8 2* 8 0* 8 2*  --  8 4  --  8 3  --  8 5 8 4   MAGNESIUM mg/dL  --  1 9 2 0  --  2 3  --   --   --   --   --    PHOSPHORUS mg/dL  --  5 4* 5 6*  --  5 8*  --  4 6*  --   --   --        VAS upper limb venous duplex scan, unilateral/limited   Final Result by Alvaro Gaona MD (07/27 1720)      IR non-tunneled central line placement   Final Result by Shaheed Slaughter DO (07/16 1935)   Impression: Successful placement of a central venous catheter via the right external jugular vein  Workstation performed: [id number]         XR chest 1 view portable   Final Result by Reina De La Fuente MD (07/16 0998)      Question mild bilateral groundglass opacity in the upper lungs  If this is a real finding, it could be due to Covid-19 pneumonia  Workstation performed: [id number]             Portions of the record may have been created with voice recognition software  Occasional wrong word or "sound a like" substitutions may have occurred due to the inherent limitations of voice recognition software  Read the chart carefully and recognize, using context, where substitutions have occurred

## 2021-08-03 NOTE — DISCHARGE SUMMARY
Transition of Care Discharge Summary - [facility] Internal Medicine    Patient Information: Gia Anthony 77 y o  female MRN: [id number]  Unit/Bed#: -01 Encounter: [id number]    Discharging Physician / Practitioner: Brian Macias MD  PCP: Anita Argueta MD  Admission Date: 7/15/2021  Discharge Date: 08/03/21    Disposition:      Short Term Rehab or SNF at a [address] [place] [address] (see below)    For Discharges to [address] [place] [address] SNF:   · [facility] / [facility] at [phone] [facility]F Physician    Reason for Admission: fever    Discharge Diagnoses:     Principal Problem:    COVID-19 virus infection  Active Problems:    Chronic saddle pulmonary embolism ([facility] )    Obstructive uropathy    Polycythemia vera ([facility] )    Anemia in CKD (chronic kidney disease)    Acute renal failure superimposed on stage 5 chronic kidney disease, not on chronic dialysis ([facility] )    History of Clostridioides difficile colitis    Class 2 severe obesity due to excess calories with serious comorbidity and body mass index (BMI) of 35 0 to 35 9 in [facility])    Constipation    Edema of right upper extremity  Resolved Problems:    Metabolic acidosis    Hypokalemia    Acute respiratory failure with hypoxia ([facility] )    Hyperkalemia      Consultations During Hospital Stay:  · Interventional radiology  · Nephrology  · pulmonology    Procedures Performed:     · Arterial line insertion  · Central line placement    Medication Adjustments and Discharge Medications:  · Summary of Medication Adjustments made as a result of this hospitalization: prednisone taper added  · Medication Dosing Tapers - Please refer to Discharge Medication List for details on any medication dosing tapers (if applicable to patient)  · Medications being temporarily held (include recommended restart time): None  · Discharge Medication List: See after visit summary for reconciled discharge medications       Wound Care Recommendations:  When applicable, please see wound care section of After Visit Summary  Diet Recommendations at Discharge:  Diet -        Diet Orders   (From admission, onward)             Start     Ordered    07/29/21 1106  Diet Regular; Regular House; Potassium 2 GM, Regular House  Diet effective now     Question Answer Comment   Diet Type Regular    Regular Regular House    Other Restriction(s): Potassium 2 GM    Other Restriction(s): Regular House    RD to adjust diet per protocol? Yes        07/29/21 1106    07/25/21 1141  Dietary nutrition supplements  Once     Question Answer Comment   Select Supplement: Nepro-Mixed Berry    Frequency Breakfast, Dinner        07/25/21 1141              Fluid Restriction - No Fluid Restriction at Discharge  Instructions for any Catheters / Lines Present at Discharge (including removal date, if applicable): None    Significant Findings / Test Results:     Question mild bilateral groundglass opacity in the upper lungs  If this is a real finding, it could be due to Covid-19 pneumonia  RIGHT UPPER LIMB:  No evidence of acute deep vein thrombosis  NOTE:  IV/dressing at right IJV  No evidence of acute superficial thrombophlebitis noted  The right brachio-basilic AVF is patent  LEFT UPPER LIMB:  Not evaluated  Incidental Findings:   · None     Test Results Pending at Discharge (will require follow up): · None     Outpatient Tests Requested:  · BMP in 3-4 days    Complications:  None    Hospital Course:     Kati Griffin is a 77 y o  female patient who originally presented to the hospital on 7/15/2021 due to fever and shortness of breath  She was found to have COVID-19 and admitted for treatment  She initially was on the mild protocol but respiratory status worsened to severe requiring high flow oxygen  She was treated with IV steroids and eventually stabilized and was weaned off oxygen  She is to complete a prednisone taper and discharged to inpatient rehabilitation      Condition at Discharge: stable     Discharge Day Visit / Exam:     Subjective:  "I'm hanging in there"  Vitals: Blood Pressure: 142/64 (08/03/21 0959)  Pulse: 80 (08/03/21 0959)  Temperature: 98 °F (36 7 °C) (08/03/21 0700)  Temp Source: Oral (08/03/21 0700)  Respirations: 18 (08/02/21 2227)  Height: 5' (152 4 cm) (07/15/21 1750)  Weight - Scale: 88 kg (194 lb 0 1 oz) (07/30/21 0458)  SpO2: 92 % (08/03/21 0900)  Exam:   Physical Exam  Constitutional:       Appearance: She is obese  HENT:      Head: Normocephalic and atraumatic  Cardiovascular:      Rate and Rhythm: Normal rate and regular rhythm  Pulmonary:      Effort: Pulmonary effort is normal    Musculoskeletal:      Comments: RUE edema   Skin:     General: Skin is warm and dry  Neurological:      General: No focal deficit present  Mental Status: She is alert and oriented to person, place, and time  Psychiatric:         Mood and Affect: Mood normal          Behavior: Behavior normal          Goals of Care Discussions:  · Code Status at Discharge: Level 1 - Full Code  · Were there any Goals of Care Discussions during Hospitalization?: No  · Results of any General Goals of Care Discussions: N/A  · POLST Completed: Yes   · If POLST Completed, Summary of POLST Agreement Provided Here: N/A   · OK to Rehospitalize if Needed? Yes    Discharge instructions/Information to patient and family:   See after visit summary section titled Discharge Instructions for information provided to patient and family  Planned Readmission: No      Discharge Statement:  I spent 45 minutes discharging the patient  This time was spent on the day of discharge  I had direct contact with the patient on the day of discharge  Greater than 50% of the total time was spent examining patient, answering all patient questions, arranging and discussing plan of care with patient as well as directly providing post-discharge instructions  Additional time then spent on discharge activities      ** Please Note: This note has been constructed using a voice recognition system **

## 2021-08-03 NOTE — TELEPHONE ENCOUNTER
Poonam Jones [facility] called to receive new admit orders  Dr zhou was paged via [employer] connect

## 2021-08-03 NOTE — TRANSPORTATION MEDICAL NECESSITY
Section I - General Information    Name of Patient: Luba Braden                 :     Medicare #: [id number]  Transport Date: 21 (PCS is valid for round trips on this date and for all repetitive trips in the 60-day range as noted below )  Origin: [address]                                                         Destination: [address] [place] [address] St  Is the pt's stay covered under Medicare Part A (PPS/DRG)   []     Closest appropriate facility? If no, why is transport to more distant facility required? Yes  If hospice pt, is this transport related to pt's terminal illness? NA       Section II - Medical Necessity Questionnaire  Ambulance transportation is medically necessary only if other means of transport are contraindicated or would be potentially harmful to the patient  To meet this requirement, the patient must either be "bed confined" or suffer from a condition such that transport by means other than ambulance is contraindicated by the patient's condition  The following questions must be answered by the medical professional signing below for this form to be valid:    1)  Describe the MEDICAL CONDITION (physical and/or mental) of this patient AT [address] [place] [address] that requires the patient to be transported in an ambulance and why transport by other means is contraindicated by the patient's condition: Covid 19 virus infection, acute respiratory failure with hypoxia- on O2 at 2lpm, Acute renal failure superimposed on stage 5 chronic kidney failure, not on chronic dialysis, chronic saddle pulmonary embolism, obstructive uropathy  2) Is the patient "bed confined" as defined below? No  To be "be confined" the patient must satisfy all three of the following conditions: (1) unable to get up from bed without Assistance; AND (2) unable to ambulate; AND (3) unable to sit in a chair or wheelchair      3) Can this patient safely be transported by car or wheelchair van (i e , seated during transport without a medical attendant or monitoring)? Yes    4) In addition to completing questions 1-3 above, please check any of the following conditions that apply*:   *Note: supporting documentation for any boxes checked must be maintained in the patient's medical records  If hosp-hosp transfer, describe services needed at 2nd facility not available at 1st facility? Medical attendant required   Requires oxygen-unable to self administer  Special handling/isolation/infection control precautions required   Other(specify) high fall risk      Section III - Signature of Physician or Healthcare Professional  I certify that the above information is true and correct based on my evaluation of this patient, and represent that the patient requires transport by ambulance and that other forms of transport are contraindicated  I understand that this information will be used by the Centers for Medicare and Medicaid Services (CMS) to support the determination of medical necessity for ambulance services, and I represent that I have personal knowledge of the patient's condition at time of transport  []  If this box is checked, I also certify that the patient is physically or mentally incapable of signing the ambulance service's claim and that the institution with which I am affiliated has furnished care, services, or assistance to the patient  My signature below is made on behalf of the patient pursuant to 42 CFR §424 36(b)(4)  In accordance with 42 CFR §424 37, the specific reason(s) that the patient is physically or mentally incapable of signing the claim form is as follows:  Sherita Dubin of Physician* or Healthcare Professional______________________________________________________________  Signature Date 08/03/21 (For scheduled repetitive transports, this form is not valid for transports performed more than 60 days after this date)    Printed Name & Credentials of Physician or Healthcare Professional (MD, , RN, etc )___[place] [address] [facility], KAYLEE RN_____________________________  *Form must be signed by patient's attending physician for scheduled, repetitive transports   For non-repetitive, unscheduled ambulance transports, if unable to obtain the signature of the attending physician, any of the following may sign (choose appropriate option below)  [] Physician Assistant []  Clinical Nurse Specialist []  Registered Nurse  []  Nurse Practitioner  [x] Discharge Planner

## 2021-08-03 NOTE — PROGRESS NOTES
Report given to alyssa at [facility]  Discharge papers faxed and sent via [facility] transport to facility  Patient discharged in good condition tearful but aware of transfer

## 2021-08-04 ENCOUNTER — TELEPHONE (OUTPATIENT)
Dept: OTHER | Facility: OTHER | Age: 76
End: 2021-08-04

## 2021-08-04 ENCOUNTER — TELEPHONE (OUTPATIENT)
Dept: NEPHROLOGY | Facility: CLINIC | Age: 76
End: 2021-08-04

## 2021-08-04 DIAGNOSIS — N17.9 ACUTE RENAL FAILURE SUPERIMPOSED ON STAGE 5 CHRONIC KIDNEY DISEASE, NOT ON CHRONIC DIALYSIS, UNSPECIFIED ACUTE RENAL FAILURE TYPE (HCC): ICD-10-CM

## 2021-08-04 DIAGNOSIS — N25.81 SECONDARY HYPERPARATHYROIDISM OF RENAL ORIGIN (HCC): ICD-10-CM

## 2021-08-04 DIAGNOSIS — N18.5 ACUTE RENAL FAILURE SUPERIMPOSED ON STAGE 5 CHRONIC KIDNEY DISEASE, NOT ON CHRONIC DIALYSIS, UNSPECIFIED ACUTE RENAL FAILURE TYPE (HCC): ICD-10-CM

## 2021-08-04 DIAGNOSIS — N18.5 CKD (CHRONIC KIDNEY DISEASE) STAGE 5, GFR LESS THAN 15 ML/MIN (HCC): Primary | ICD-10-CM

## 2021-08-04 NOTE — TELEPHONE ENCOUNTER
----- Message from Demond Myles MD sent at 8/4/2021  7:00 AM EDT -----  She got discharged from [facility] yesterday  She is our established patient in the office with Dr Eliane Snider  She is COVID positive and should have telemedicine follow-up in 3 to 4 weeks with extender

## 2021-08-04 NOTE — TELEPHONE ENCOUNTER
Connie Toledo called from [facility] requesting a call back from on call provider regarding pt's medication order  Pt's info has been sent to provider via TC

## 2021-08-04 NOTE — TELEPHONE ENCOUNTER
A message has been left asking pt to contact the office  Patient can be scheduled on 8/31 with Dr Kali Ray in the [address] (slot has been placed on hold)     Dr Kali Ray, did you want a renal fx panel, pth, phos? Most recent mag was normal  She currently has a standing CBC order

## 2021-08-05 ENCOUNTER — NURSING HOME VISIT (OUTPATIENT)
Dept: GERIATRICS | Facility: CLINIC | Age: 76
End: 2021-08-05
Payer: COMMERCIAL

## 2021-08-05 DIAGNOSIS — N25.81 SECONDARY HYPERPARATHYROIDISM OF RENAL ORIGIN (HCC): ICD-10-CM

## 2021-08-05 DIAGNOSIS — U07.1 PNEUMONIA DUE TO COVID-19 VIRUS: ICD-10-CM

## 2021-08-05 DIAGNOSIS — J12.82 PNEUMONIA DUE TO COVID-19 VIRUS: ICD-10-CM

## 2021-08-05 DIAGNOSIS — E78.5 HYPERLIPIDEMIA, UNSPECIFIED HYPERLIPIDEMIA TYPE: ICD-10-CM

## 2021-08-05 DIAGNOSIS — D63.1 ANEMIA IN STAGE 5 CHRONIC KIDNEY DISEASE, NOT ON CHRONIC DIALYSIS (HCC): ICD-10-CM

## 2021-08-05 DIAGNOSIS — N18.5 STAGE 5 CHRONIC KIDNEY DISEASE NOT ON CHRONIC DIALYSIS (HCC): ICD-10-CM

## 2021-08-05 DIAGNOSIS — K59.01 SLOW TRANSIT CONSTIPATION: ICD-10-CM

## 2021-08-05 DIAGNOSIS — N18.5 ANEMIA IN STAGE 5 CHRONIC KIDNEY DISEASE, NOT ON CHRONIC DIALYSIS (HCC): ICD-10-CM

## 2021-08-05 DIAGNOSIS — R53.1 GENERALIZED WEAKNESS: Primary | ICD-10-CM

## 2021-08-05 DIAGNOSIS — I25.118 CORONARY ARTERY DISEASE OF NATIVE ARTERY OF NATIVE HEART WITH STABLE ANGINA PECTORIS (HCC): ICD-10-CM

## 2021-08-05 DIAGNOSIS — R26.2 AMBULATORY DYSFUNCTION: ICD-10-CM

## 2021-08-05 DIAGNOSIS — I10 ESSENTIAL HYPERTENSION: ICD-10-CM

## 2021-08-05 PROCEDURE — 99306 1ST NF CARE HIGH MDM 50: CPT | Performed by: FAMILY MEDICINE

## 2021-08-05 NOTE — PROGRESS NOTES
[address]  History and Physical    NAME: Britany Shahid  AGE: 77 y o  SEX: female [id number]    DATE OF ENCOUNTER: 8/5/2021    Code status: Full    Assessment and Plan     Deconditioned post COVID-19 pneumonia /Ambulatory dysfuction  - Respiratory status stable 96% Oxygen saturation with no work of breathing   - Patient has generalized weakness from recent COVID-19 pneumonia- she was not vaccinated prior to her hospitalization   - Fall precautions in place  - Physical and occupational therapy  - Improve nutrition  - Oral hydration    Stage 5 CKD due to obstructive uropathy with functional solitary R-kidney and prior episodes of LUANN  - GFR 10 (7/15/2021)  - No uremic symptoms, UOP ok  - Status post R-UE AVF creation on 1/05/2021 (No home support for home dialysis)  - Has nephrostomy tubes - needed tube exchanges  - Baseline creatinine: 3 4-3 5, last Cr 3 27 on 8/2 vs 3 86 (7/1/2021)  - Continue hydration  - Avoid hypotension and nephrotoxins  - Renal dose medications    Obstructive uropathy  - Status post cystectomy with ileal conduit (ileostomy) for nonfunctioning bladder and chronic B/L hydronephrosis  - Urinary incontinence s/p bladder surgery  - Outpatient urology follow up  Hypertension  - Blood pressure stable  - Continue Metoprolol 25 mg twice daily    Anemia of CKD  - On Aranesp, last Hb was 7 9 on (8/2) vs 9 9 (7/1/2021)    Mineral bone disease of CKD  - Stable per labs done last month   - , Phos 5 4 (8/2/2021)  - Calcitriol increased from 3 x/wk to 0 25 mcg po daily as per nephrologist    Metabolic acidosis  - Bicarb 14 (7/1) Non compliant with Bicarb 1300mgBID due to swelling  - Continue Sodium bicarb 650 mg BID - monitor for swelling, encourage compliance      Polycythemia vera  - Continue Jakafi 10 mg po daily as per outpatient Hem/Onc     Chronic saddle pulmonary embolism  - Respiratory parameters stable  - Continue Eliquis po BID  - No evidence of bleeding- continue to monitor    Hx of incarcerated parastomal hernia repair   - Status post ex-lap, reduction and parastomal hernia repair   - Had SBO (2021) treated medically    Hyperlipidemia  - Encourage low cholesterol AHA diet  - Continue Atorvastatin 40 mg QD    CAD  - Doing well, No angina  - Continue Aspirin 81mg po daily  - Continue Statin, beta blocker    Constipation- slow transit  - Last BM 2 days ago  - Add senna 8 6mg prn  - Encourage water hydration  - Continue Colace 100 mg BID  - Miralax 17 g QD prn      All medications and routine orders were reviewed and updated as needed  Cares for grown autistic son in his 42's    Plan discussed with: Full    Chief Complaint     Seen for admission at [address]    History of Present Illness     Ms Demi Biggs is a 77 y o  white female with hx of bladder surgery (2016), incarcerated parastomal hernia s/p ex lap (8/2020), recurrent SBO, hypothyroidism, hyperparathyroidism, hx  of PE (2006) on anticoagulation (Eliquis), chronic thrombosis of R-subclavian vein, HTN, Meningioma, MDD, polycythemia vera, hx of SDH s/p fall, Stg 5 Chronic Kidney Disease s/p RUE AVF (01/05/2021)  She was admitted to [facility] on 7/15/2021 with a week of generalized weakness, decreasing oral intake, ambulatory dysfunction, dry cough and diarrhea  Patient tested positive for COVID - she was not hypoxic initially and started on mild COVID protocol but respiratory status worsened to severe requiring high flow oxygen  She was unvaccinated  Patient was seen by the Pulmonary service for assistance in management of acute hypoxic respiratory failure necessitating mid flow nasal cannula due to COVID-19 pneumonia with bilateral opacities per imaging of her lungs  Pulmonologist recommended to continue Remdesivir to complete 5 day course    Patient was treated with IV steroids and eventually stabilized and was weaned off oxygen and her iv steroids switched to oral regimen (taper)  Patient was started on vancomycin empirically for episodes of diarrhea  Other chronic issues including were managed during her hospitalization  Patient was also seen by Renal service for continue management of her stage 5 chronic kidney disease  Patient seen and examined  Out of bed to chair  Doing ok, reports poor sleep due to noise otherwise no complains  No dyspnea, no angina, pedal edema worsened by humidity  No N/V  No gross hematuria or blood in stool  Her ileal conduit output has been normal with no decrease urine output noted by patient  Afebrile    HISTORY:  Past Medical History:   Diagnosis Date    Anxiety     Bowel obstruction (HCC)     Chronic kidney disease (CKD), stage IV (severe) (HCC)     stage IV    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    Circulation problem     Compression fracture of cervical spine (HCC)     Hernia of abdominal cavity     History of kidney problems     Hydronephrosis     Hypertension     IBS (irritable bowel syndrome)     Incontinence     Lung mass     Improving on PET/CT 1/2016    Polycythemia vera ([address] )     Pulmonary embolism ([facility] [address] ) 2014    Shingles     Urinary tract infection      Family History   Problem Relation Age of Onset    Cancer Mother         small cell cancer     Heart disease Father     COPD Father     Heart disease Brother     Nephrolithiasis Brother      Social History     Socioeconomic History    Marital status:       Spouse name: Not on file    Number of children: Not on file    Years of education: Not on file    Highest education level: Not on file   Occupational History    Not on file   Tobacco Use    Smoking status: Never Smoker    Smokeless tobacco: Never Used    Tobacco comment: n/a   Vaping Use    Vaping Use: Never used   Substance and Sexual Activity    Alcohol use: Not Currently     Comment: n/s    Drug use: Not Currently     Comment: n/a    Sexual activity: Not Currently     Partners: Male   Other Topics Concern    Not on file   Social History Narrative    Not on file     Social Determinants of Health     Financial Resource Strain:     Difficulty of Paying Living Expenses:    Food Insecurity:     Worried About Running Out of Food in the Last Year:     [address] in the Last Year:    Transportation Needs:     Lack of Transportation (Medical):  Lack of Transportation (Non-Medical):    Physical Activity:     Days of Exercise per Week:     Minutes of Exercise per Session:    Stress:     Feeling of Stress :    Social Connections:     Frequency of Communication with Friends and Family:     Frequency of Social Gatherings with Friends and Family:     Attends [employer] Services:     Active Member of Clubs or Organizations:     Attends Club or Organization Meetings:     Marital Status:    Intimate Partner Violence:     Fear of Current or Ex-Partner:     Emotionally Abused:     Physically Abused:     Sexually Abused: Allergies: Allergies   Allergen Reactions    Chlorhexidine Rash     petichi like rash when using chlorhexidine swabs prior to IV  Review of Systems     Review of Systems   Constitutional: Positive for fatigue  Negative for activity change, appetite change, chills, diaphoresis and fever  HENT: Positive for sore throat  Negative for trouble swallowing  Eyes: Negative for visual disturbance  Respiratory: Negative for cough, chest tightness and shortness of breath  SOB with activity   Cardiovascular: Negative for chest pain and palpitations  Gastrointestinal: Negative for abdominal pain, constipation, diarrhea and nausea  Endocrine: Negative for cold intolerance and heat intolerance  Genitourinary: Negative for dysuria, flank pain, hematuria and urgency  Musculoskeletal: Positive for gait problem  Skin: Negative for rash         Medications and orders     All medications reviewed and updated in Nursing Home EMR  Objective     Vitals reviewed:   Temp: 97 7  Pulse:77  B/P:143/77  RR:16  O2sat:96  Wt: 194 lbs    Physical Exam  Vitals reviewed  Constitutional:       General: She is not in acute distress  Appearance: She is obese  She is not diaphoretic  Comments: Looks tired, chronically ill  Reports did not sleep good last night due to noise  HENT:      Head: Normocephalic and atraumatic  Right Ear: Ear canal normal       Left Ear: Ear canal normal       Ears:      Comments: Hearing normal to finger rub     Nose: Nose normal       Mouth/Throat:      Mouth: Mucous membranes are moist       Pharynx: No oropharyngeal exudate or posterior oropharyngeal erythema  Eyes:      Extraocular Movements: Extraocular movements intact  Conjunctiva/sclera: Conjunctivae normal       Pupils: Pupils are equal, round, and reactive to light  Neck:      Comments: R-neck line access  Cardiovascular:      Rate and Rhythm: Normal rate and regular rhythm  Heart sounds: Normal heart sounds  Pulmonary:      Effort: Pulmonary effort is normal       Breath sounds: Normal breath sounds  No wheezing, rhonchi or rales  Comments: Poor inspiratory effort  Abdominal:      General: Bowel sounds are normal       Palpations: Abdomen is soft  Tenderness: There is no abdominal tenderness  There is no right CVA tenderness or left CVA tenderness  Musculoskeletal:         General: No tenderness  Cervical back: Neck supple  Comments: R-AVF good thrill   Skin:     General: Skin is warm  Neurological:      Mental Status: She is alert and oriented to person, place, and time  Pertinent Laboratory/Diagnostic Studies: The following labs/studies were reviewed please see chart or hospital paperwork for details        - Counseling Documentation: patient was counseled regarding: instructions for management

## 2021-08-09 ENCOUNTER — NURSING HOME VISIT (OUTPATIENT)
Dept: GERIATRICS | Facility: OTHER | Age: 76
End: 2021-08-09
Payer: COMMERCIAL

## 2021-08-09 DIAGNOSIS — I10 ESSENTIAL HYPERTENSION: ICD-10-CM

## 2021-08-09 DIAGNOSIS — D45 POLYCYTHEMIA VERA (HCC): ICD-10-CM

## 2021-08-09 DIAGNOSIS — I27.82 CHRONIC SADDLE PULMONARY EMBOLISM WITHOUT ACUTE COR PULMONALE (HCC): ICD-10-CM

## 2021-08-09 DIAGNOSIS — K59.01 SLOW TRANSIT CONSTIPATION: ICD-10-CM

## 2021-08-09 DIAGNOSIS — N25.81 SECONDARY HYPERPARATHYROIDISM OF RENAL ORIGIN (HCC): ICD-10-CM

## 2021-08-09 DIAGNOSIS — D63.1 ANEMIA IN STAGE 5 CHRONIC KIDNEY DISEASE, NOT ON CHRONIC DIALYSIS (HCC): ICD-10-CM

## 2021-08-09 DIAGNOSIS — N18.5 STAGE 5 CHRONIC KIDNEY DISEASE NOT ON CHRONIC DIALYSIS (HCC): ICD-10-CM

## 2021-08-09 DIAGNOSIS — R60.0 EDEMA OF RIGHT UPPER EXTREMITY: ICD-10-CM

## 2021-08-09 DIAGNOSIS — U07.1 COVID-19 VIRUS INFECTION: Primary | ICD-10-CM

## 2021-08-09 DIAGNOSIS — F32.A DEPRESSION, UNSPECIFIED DEPRESSION TYPE: ICD-10-CM

## 2021-08-09 DIAGNOSIS — N18.5 ANEMIA IN STAGE 5 CHRONIC KIDNEY DISEASE, NOT ON CHRONIC DIALYSIS (HCC): ICD-10-CM

## 2021-08-09 DIAGNOSIS — I26.92 CHRONIC SADDLE PULMONARY EMBOLISM WITHOUT ACUTE COR PULMONALE (HCC): ICD-10-CM

## 2021-08-09 PROCEDURE — 99309 SBSQ NF CARE MODERATE MDM 30: CPT | Performed by: NURSE PRACTITIONER

## 2021-08-09 NOTE — PROGRESS NOTES
[address], [address], [facility], [address] [place] [address]  [phone]    NAME: Pablo Carr  AGE: 77 y o  SEX: female    Progress Note    Location: OO  POS: 32 ([facility])    Assessment/Plan:    COVID-19 virus infection  Stable  Denies any SOB on this visit  NO hypoxia on RA  Still with fatigued presentation  Improved meal completion on the last 3 days: 100%  Continue tapering dose of Prednisone until 8/24/2021  Continue 24/7 [facility] supportive care and management    Chronic saddle pulmonary embolism (HCC)  Continue Eliquis 2 5mg BID    Stage 5 chronic kidney disease not on chronic dialysis [facility])  Lab Results   Component Value Date    EGFR 14 08/03/2021    EGFR 13 08/02/2021    EGFR 13 08/01/2021    CREATININE 3 17 (H) 08/03/2021    CREATININE 3 27 (H) 08/02/2021    CREATININE 3 28 (H) 08/01/2021   Followed by Nephrology office  Last seen on July 7, 2021  Per note, baseline Crea: low to mid 3 0's  Patient currently not on oral fluid restrictions  Continue Calcitriol 0 25mcg daily  Continue Sodium bicarbonate 650mg BID  Next Nephrology appointment: 9/15/2021  Secondary hyperparathyroidism of renal origin ([facility] [address] )  Stable  PTH and renal functions stable (8/3/2021)  Please see management in setting of #3  Continue Vit D 1000 units daily    Polycythemia vera (HCC)  Continue Jakafi 10mg at bedtime    Anemia in CKD (chronic kidney disease)  Hbg/Hct: 7 9/ 25 0 (8/2/2021)  CBC without diff on 8/12/2021    Depression  Stable  Continue Citalopram 20mg daily  Patient denies sleep/appetite not mood changes on this visit  Hypertension  BP range (8/3-9/2021) = 120/72 to 160/89  ** 2 episodes of SBP > 150 on this period  HR range (8/3-9/2021) = 65 to 82/min  Continue Metoprolol tartrate 25mg BID    Edema of right upper extremity  Persistent but not worse  In-patient Duplex study (7/27/2021): negative for DVT or thrombophlebitis  Brachiobasilic AVF patent  Will continue to monitor      Constipation  Last BM on 8/8/2021  Continue Colace BID + Probiotic supplement daily      Chief complaint / Reason for visit: Follow-up visit    History of Present Illness: This is a 71-year-old female patient currently admitted at [facility] (8/3/2021 to present) following discharge from acute care hospitalization [facility]) with Dx of COVID-19 virus infection, Metabolic acidosis, Acute Respiratory Failure with hypoxia  Patient is seen and examined today to follow up acute and chronic medical conditions as mentioned above and Chronic Saddle PE, CKD5, HTN and  Ambulatory dysfunction with deconditioning  Patient is out of bed for this visit sitting in manual wheelchair in room - alert, cooperative, calm, pleasant, with fatigued presentation and not in distress  Patient is verbal with clear coherent speech - oriented to name/birthday/current date  Patient denies any acute medical concerns during ROS assessment including pain - expressed concern room with regards to purple colored Ileal conduit tube and drainage bag (" purple urine bag syndrome") and persistent swelling to right upper extremity  Patient has fistula on the right upper extremity - no thrills or bruit heard but loud pulsations by auscultation  Per patient edema to right upper extremity has been awhile and vascular office is aware - patient was supposed to be scheduled for Superficialization and transposition of Right brachiobasilic fistula but patient admitted to acute care for COVID-19 infection  Per patient, swelling to RUE not worsened nor better and purple color is new and has not experienced it while at home  Per nursing, no acute medical concerns for this visit  Review of Systems:  Per history of present illness, all other systems reviewed and negative      HISTORY:  Medical Hx: Reviewed, unchanged  Family Hx: Reviewed, unchanged  Soc Hx: Reviewed,  unchanged    ALLERGY: Reviewed, unchanged  Allergies   Allergen Reactions    Chlorhexidine Rash     petichi like rash when using chlorhexidine swabs prior to IV  PHYSICAL EXAM:  Vital Signs: T97 7F -P78 -R18 BP: 154/72 SpO2: 94% RA  Weight: 194/0 lbs (8/3/2021)    General: NAD  Obese  Head: Atraumatic  Normocephalic  Eye Exam: anicteric sclera, no discharge, PERRLA, No injection  Oral Exam: moist mucous membranes, no buccaloropharyngeal erythema, palatine tonsils WNL  Neck Exam: no anterior cervical lymphadenopathy noted, neck supple  Cardiovascular: regular rate, regular rhythm, no murmurs, rubs, or gallops  Pulmonary: no wheeze, no rhonchi, no rales  No chest tenderness  Abdominal: soft, non-tender, nondistended, bowel sounds audible x 4 quadrants  : Non distended bladder  Ileal conduit in place: RLQ  Extremities and skin: (+) BLE edema non pitting, no rashes  Small resolving superficial abrasion to Right UE    Multiple resolving ecchymosis to B/L UE  Right UE swelling/ lymphedema  Right brachiobasilic AVF  Neurological: alert, cooperative and responsive, Oriented x 3, moving all 4 extremities symmetrically  Ambulatory dysfunction  Laboratory results / Imaging reviewed: Hard copy/ies in medical chart: From [facility] record:    * BMP (8/3/2021) = WNL except:  Na: 134 (L)  BUN: 98 (H)  Crea: 3 17 (H)  Ca: 8 2 (L)  GFR: 14 (L)    * CBC with diff (2021) = WNL except:  RBC: 2 65 (L)  Hb 9 (L)  Hct: 25 0 (L)  RDW: 16 2 (H)  Neutroph: 82 (H)  Lymph: 10 (L)    * Parathyroid (2021) = 360 5 (H)    * TSH (2021) = 42 (H)    * Free T4 (2021) = 1 34 (H)    * COVID-19 test (7/15/2021) = Positive      Current Medications: All medications reviewed and updated in Nursing Home Chart    Please note:  Voice-recognition software may have been used in the preparation of this document  Occasional wrong word or "sound-alike" substitutions may have occurred due to the inherent limitations of voice recognition software  Interpretation should be guided by context      Tarri Akiko, CRNP  2021

## 2021-08-10 NOTE — TELEPHONE ENCOUNTER
Patient is currently residing at [facility]  Patient has been scheduled with Dr Sary Jimenez on 8/31 at our [place] office  All lab orders have been faxed to [phone] (labs will be completed at a [facility])

## 2021-08-10 NOTE — ASSESSMENT & PLAN NOTE
Stable  Denies any SOB on this visit  NO hypoxia on RA    Still with fatigued presentation  Improved meal completion on the last 3 days: 100%  Continue tapering dose of Prednisone until 8/24/2021  Continue 24/7 [facility] supportive care and management

## 2021-08-10 NOTE — ASSESSMENT & PLAN NOTE
Stable    PTH and renal functions stable (8/3/2021)  Please see management in setting of #3  Continue Vit D 1000 units daily

## 2021-08-10 NOTE — ASSESSMENT & PLAN NOTE
BP range (8/3-9/2021) = 120/72 to 160/89  ** 2 episodes of SBP > 150 on this period  HR range (8/3-9/2021) = 65 to 82/min  Continue Metoprolol tartrate 25mg BID

## 2021-08-10 NOTE — ASSESSMENT & PLAN NOTE
Persistent but not worse  In-patient Duplex study (7/27/2021): negative for DVT or thrombophlebitis  Brachiobasilic AVF patent  Will continue to monitor

## 2021-08-10 NOTE — ASSESSMENT & PLAN NOTE
Stable  Continue Citalopram 20mg daily  Patient denies sleep/appetite not mood changes on this visit

## 2021-08-10 NOTE — ASSESSMENT & PLAN NOTE
Lab Results   Component Value Date    EGFR 14 08/03/2021    EGFR 13 08/02/2021    EGFR 13 08/01/2021    CREATININE 3 17 (H) 08/03/2021    CREATININE 3 27 (H) 08/02/2021    CREATININE 3 28 (H) 08/01/2021   Followed by Nephrology office  Last seen on July 7, 2021  Per note, baseline Crea: low to mid 3 0's  Patient currently not on oral fluid restrictions  Continue Calcitriol 0 25mcg daily  Continue Sodium bicarbonate 650mg BID  Next Nephrology appointment: 9/15/2021

## 2021-08-11 ENCOUNTER — DOCUMENTATION (OUTPATIENT)
Dept: NEPHROLOGY | Facility: CLINIC | Age: 76
End: 2021-08-11

## 2021-08-11 LAB
EXT GLUCOSE BLD: 72
EXTERNAL ALBUMIN: 2.3
EXTERNAL ANION GAP: 9
EXTERNAL BUN: 72
EXTERNAL CALCIUM: 7.9
EXTERNAL CHLORIDE: 113
EXTERNAL CO2: 20
EXTERNAL CREATININE: 2.77
EXTERNAL EGFR: 16
EXTERNAL PHOSPHORUS: 4.3
EXTERNAL POTASSIUM: 4.3
EXTERNAL PTH: 193.7
EXTERNAL SODIUM: 142

## 2021-08-17 ENCOUNTER — NURSING HOME VISIT (OUTPATIENT)
Dept: GERIATRICS | Facility: OTHER | Age: 76
End: 2021-08-17
Payer: COMMERCIAL

## 2021-08-17 DIAGNOSIS — R26.2 AMBULATORY DYSFUNCTION: Primary | ICD-10-CM

## 2021-08-17 DIAGNOSIS — U07.1 COVID-19: ICD-10-CM

## 2021-08-17 DIAGNOSIS — N13.9 OBSTRUCTIVE UROPATHY: ICD-10-CM

## 2021-08-17 DIAGNOSIS — N18.4 CKD (CHRONIC KIDNEY DISEASE), STAGE IV (HCC): ICD-10-CM

## 2021-08-17 PROCEDURE — 99309 SBSQ NF CARE MODERATE MDM 30: CPT | Performed by: NURSE PRACTITIONER

## 2021-08-17 NOTE — PROGRESS NOTES
[facility]  [address]  [phone]  [place]  Code 31        NAME: Jody Darden  AGE: 77 y o  SEX: female CODE STATUS: Full Code    DATE OF ENCOUNTER: 8/17/2021    Assessment and Plan     Ambulatory dysfunction  · Generalized weakness after COVID infection  · Independent and ambulates with RW at baseline  · Currently patient needs supervision/min assist for ADLs/IADLs   · Patient states she is feeling stronger with therapy  · Continue PT/OT  · Fall Precautions   · Would benefit from PT/OT at home at d/c from [facility]    COVID-19  Stable on exam today  On Room air 95%  Denies cp/sob/palpitations/dizziness  Continue Prednisone taper til 8/24/21  Did receive 2 doses of COVID vaccine     Obstructive uropathy  ·  History of bladder mass with obstructive uropathy  · S/p cystectomy and ileal conduit October 2016 by Dr Ayesha Marks  · Denies issues with urostomy at this time  · Patient is independent with urostomy care  · Outpatient follow-up with Urology at [place] from [facility]    CKD (chronic kidney disease), stage IV [facility])  Lab Results   Component Value Date    EGFR 16 08/11/2021    EGFR 14 08/03/2021    EGFR 13 08/02/2021    CREATININE 2 77 08/11/2021    CREATININE 3 17 (H) 08/03/2021    CREATININE 3 27 (H) 08/02/2021     ·  Renal function panel done August 11, 2021 at facility  · BUN/creatinine 72/2 77  · GFR 16  · Calcium 7 9  · Phosphorus 4 3  · Stable  · Follows with Nephrology as Outpatient  · Not currently on dialysis  · S/P AV fistula Right Arm  +thrill  · Renally dose medications  · Continue calcitriol daily  · Continue sodium bicarbonate tablets daily          All medications and routine orders were reviewed and updated as needed      Chief Complaint     [facility] follow up visit     History of Present Illness     Keith Goldman is a 77year old female with a Past medical hx including but not limited to Chronic Saddle PE on Eliquis, CKD5, RUE Fistula, HTN, Constipation s/p SBO, Obstructive Uropathy seconday to Bladder mass with Chronic Juarez,  Ambulatory dysfuction  seen in collaboration with nursing for medical management and STR follow up visit  Recent hospitalization for COVID-19 virus infection, she did receive 2 doses of COVID vaccine, 2nd dose given 5/4/2021  She presented to [facility] ED on 7/15/2021 with weakness,dry cough,diarrhea  She tested positive and had bilateral airspace opacities on Chest X ray  Initially she did not require oxygen but developed hypoxia and respiratory distress requiring high flow nasal cannula  She was treated with a 5 day course of Remdesivir and IV sterioids  Regarding her diarrhea,  She does have history of C diff however her diarrhea was thought to be secondary to COVID-19 infection her C diff was negative during this admission and she was treated with IV antibiotics and received prophylactic vanco for 3 days  Her condition improved and she was discharged to rehab on 8/12/2021  She was discharged on room air and on a prednisone taper  Upon exam today patient states she is feeling stronger and is wondering when she will be going home  She denies pain/n/v/d  She states she has a good appetite  She manages her urostomy independently and denies any bladder or bowel issues  Per PT/OT patient requries some supervision and min assist with ADLs/IADLs and ambulates with RW  Per facility record, her LBM was 8/15/21 x 2  No concerns from nursing at this time  The patient's allergies, past medical, surgical, social and family history were reviewed and unchanged  Review of Systems     Review of Systems   Constitutional: Positive for activity change  Negative for chills and fever  HENT: Negative for congestion, ear pain and sore throat  Eyes: Negative for pain and visual disturbance  Respiratory: Negative for cough and shortness of breath  Cardiovascular: Negative for chest pain and palpitations     Gastrointestinal: Negative for abdominal pain, blood in stool, constipation, diarrhea and nausea  Genitourinary: Negative for difficulty urinating, dysuria and hematuria  Musculoskeletal: Positive for gait problem  Negative for arthralgias and back pain  Skin: Negative for color change and rash  Neurological: Positive for weakness  Negative for dizziness, numbness and headaches  Psychiatric/Behavioral: Negative for dysphoric mood and sleep disturbance  The patient is not nervous/anxious  All other systems reviewed and are negative  Objective     Vitals: Temp 97 5 Pulse 70 RR 20 /78 O2 95% Weight 194 lbs    Physical Exam  Vitals and nursing note reviewed  Constitutional:       General: She is not in acute distress  Appearance: Normal appearance  She is obese  HENT:      Head: Normocephalic and atraumatic  Nose: No congestion or rhinorrhea  Mouth/Throat:      Mouth: Mucous membranes are moist    Eyes:      General: No scleral icterus  Conjunctiva/sclera: Conjunctivae normal       Pupils: Pupils are equal, round, and reactive to light  Cardiovascular:      Rate and Rhythm: Normal rate and regular rhythm  Pulses: Normal pulses  Heart sounds: Normal heart sounds  No murmur heard  Pulmonary:      Effort: Pulmonary effort is normal  No respiratory distress  Breath sounds: Normal breath sounds  No wheezing, rhonchi or rales  Abdominal:      General: Bowel sounds are normal  There is no distension  Palpations: Abdomen is soft  Tenderness: There is no abdominal tenderness  Genitourinary:     Comments: Urostomy draining without difficulty  Musculoskeletal:         General: Swelling (mild to RUE- AVF +thrill ) present  No tenderness  Lymphadenopathy:      Cervical: No cervical adenopathy  Skin:     General: Skin is warm and dry  Coloration: Skin is not pale  Findings: No rash  Neurological:      General: No focal deficit present        Mental Status: She is alert and oriented to person, place, and time  Mental status is at baseline  Motor: Weakness present  Gait: Gait abnormal    Psychiatric:         Mood and Affect: Mood normal          Behavior: Behavior normal          Pertinent Laboratory/Diagnostic Studies:  Reviewed in facility chart    Current Medications   Medications reviewed and updated see facility [facility] for details  Current Outpatient Medications:     acetaminophen (TYLENOL) 325 mg tablet, 650 mg every 6 hours as needed for mild pain or headache, Disp: 30 tablet, Rfl: 0    apixaban (Eliquis) 2 5 mg, Take 1 tablet (2 5 mg total) by mouth 2 (two) times a day, Disp: 60 tablet, Rfl: 0    atorvastatin (LIPITOR) 40 mg tablet, Take 1 tablet (40 mg total) by mouth daily at bedtime, Disp: , Rfl:     calcitriol (ROCALTROL) 0 25 mcg capsule, Take 1 capsule (0 25 mcg total) by mouth daily, Disp: 30 capsule, Rfl: 5    Cholecalciferol (VITAMIN D3) 1000 UNITS CAPS, Take 1,000 unit marking on U-100 syringe by mouth daily  , Disp: , Rfl:     citalopram (CeleXA) 20 mg tablet, Take 20 mg by mouth daily , Disp: , Rfl:     docusate sodium (COLACE) 100 mg capsule, Take 1 capsule (100 mg total) by mouth 2 (two) times a day, Disp: 10 capsule, Rfl: 0    Jakafi 10 MG tablet, TAKE 1 TABLET (10 MG TOTAL) BY MOUTH DAILY, Disp: 30 tablet, Rfl: 3    levothyroxine 75 mcg tablet, Take 75 mcg by mouth daily, Disp: , Rfl: 3    loperamide (IMODIUM) 2 mg capsule, Take 1 capsule (2 mg total) by mouth 4 (four) times a day as needed for diarrhea, Disp: 12 capsule, Rfl: 0    melatonin 3 mg, Take 1 tablet (3 mg total) by mouth daily at bedtime, Disp: 30 tablet, Rfl: 0    metoprolol tartrate (LOPRESSOR) 25 mg tablet, Take 1 tablet (25 mg total) by mouth 2 (two) times a day, Disp: 60 tablet, Rfl: 0    polyethylene glycol (MIRALAX) 17 g packet, Take 17 g by mouth daily as needed (constipation), Disp: 10 each, Rfl: 0    predniSONE 20 mg tablet, Take 2 tablets (40 mg total) by mouth daily for 5 days, THEN 1 5 tablets (30 mg total) daily for 5 days, THEN 1 tablet (20 mg total) daily for 5 days, THEN 0 5 tablets (10 mg total) daily for 5 days  , Disp: 25 tablet, Rfl: 0    saccharomyces boulardii (FLORASTOR) 250 mg capsule, Take 1 capsule by mouth daily  , Disp: , Rfl:     sodium bicarbonate 650 mg tablet, Take 1 tablet (650 mg total) by mouth 2 (two) times daily after meals, Disp: , Rfl: 0     Plan discussed with Dr Beatrice Machuca noted agreement with assessment and plan  Please note:  Voice-recognition software may have been used in the preparation of this document  Occasional wrong word or "sound-alike" substitutions may have occurred due to the inherent limitations of voice recognition software  Interpretation should be guided by VICTORINA Marcus  8/17/2021 12:04 PM

## 2021-08-19 ENCOUNTER — NURSING HOME VISIT (OUTPATIENT)
Dept: GERIATRICS | Facility: OTHER | Age: 76
End: 2021-08-19
Payer: COMMERCIAL

## 2021-08-19 DIAGNOSIS — E78.5 HYPERLIPIDEMIA, UNSPECIFIED HYPERLIPIDEMIA TYPE: ICD-10-CM

## 2021-08-19 DIAGNOSIS — F32.A DEPRESSION, UNSPECIFIED DEPRESSION TYPE: Primary | ICD-10-CM

## 2021-08-19 DIAGNOSIS — U07.1 COVID-19: ICD-10-CM

## 2021-08-19 DIAGNOSIS — I27.82 CHRONIC SADDLE PULMONARY EMBOLISM WITHOUT ACUTE COR PULMONALE (HCC): Chronic | ICD-10-CM

## 2021-08-19 DIAGNOSIS — N25.81 SECONDARY HYPERPARATHYROIDISM OF RENAL ORIGIN (HCC): ICD-10-CM

## 2021-08-19 DIAGNOSIS — I26.92 CHRONIC SADDLE PULMONARY EMBOLISM WITHOUT ACUTE COR PULMONALE (HCC): Chronic | ICD-10-CM

## 2021-08-19 DIAGNOSIS — N18.4 CKD (CHRONIC KIDNEY DISEASE), STAGE IV (HCC): ICD-10-CM

## 2021-08-19 DIAGNOSIS — D45 POLYCYTHEMIA VERA (HCC): ICD-10-CM

## 2021-08-19 PROBLEM — R26.2 AMBULATORY DYSFUNCTION: Status: ACTIVE | Noted: 2021-08-19

## 2021-08-19 PROCEDURE — 99316 NF DSCHRG MGMT 30 MIN+: CPT | Performed by: NURSE PRACTITIONER

## 2021-08-19 RX ORDER — CALCITRIOL 0.25 UG/1
0.25 CAPSULE, LIQUID FILLED ORAL DAILY
Qty: 14 CAPSULE | Refills: 0 | Status: SHIPPED | OUTPATIENT
Start: 2021-08-19 | End: 2021-12-06 | Stop reason: SDUPTHER

## 2021-08-19 RX ORDER — ATORVASTATIN CALCIUM 40 MG/1
40 TABLET, FILM COATED ORAL
Qty: 14 TABLET | Refills: 0 | Status: SHIPPED | OUTPATIENT
Start: 2021-08-19 | End: 2021-12-06 | Stop reason: SDUPTHER

## 2021-08-19 RX ORDER — SACCHAROMYCES BOULARDII 250 MG
250 CAPSULE ORAL DAILY
Qty: 14 CAPSULE | Refills: 0 | Status: SHIPPED | OUTPATIENT
Start: 2021-08-19

## 2021-08-19 RX ORDER — CITALOPRAM 20 MG/1
20 TABLET ORAL DAILY
Qty: 14 TABLET | Refills: 0 | Status: SHIPPED | OUTPATIENT
Start: 2021-08-19 | End: 2021-11-24 | Stop reason: ALTCHOICE

## 2021-08-19 RX ORDER — PREDNISONE 10 MG/1
10 TABLET ORAL DAILY
Qty: 4 TABLET | Refills: 0 | Status: SHIPPED | OUTPATIENT
Start: 2021-08-20 | End: 2021-08-24

## 2021-08-19 RX ORDER — LEVOTHYROXINE SODIUM 0.07 MG/1
75 TABLET ORAL DAILY
Qty: 14 TABLET | Refills: 0 | Status: SHIPPED | OUTPATIENT
Start: 2021-08-19 | End: 2021-12-06 | Stop reason: SDUPTHER

## 2021-08-19 RX ORDER — SODIUM BICARBONATE 650 MG/1
650 TABLET ORAL
Qty: 28 TABLET | Refills: 0 | Status: SHIPPED | OUTPATIENT
Start: 2021-08-19 | End: 2021-11-13 | Stop reason: HOSPADM

## 2021-08-19 NOTE — ASSESSMENT & PLAN NOTE
Lab Results   Component Value Date    EGFR 16 08/11/2021    EGFR 14 08/03/2021    EGFR 13 08/02/2021    CREATININE 2 77 08/11/2021    CREATININE 3 17 (H) 08/03/2021    CREATININE 3 27 (H) 08/02/2021     · Hx of CKD Stage 5- Not on HD s/p RUE AV Fistula placed Jan 2021)   · Follows with Nephrology- Dr Ladan Anderson  · Right AVF +thrill - no edema noted  · Renally Dose medications  · Continue Calcitrol and Sodium BiCarb tablets

## 2021-08-19 NOTE — ASSESSMENT & PLAN NOTE
· Hx of Saddle PE and chronic thrombosis of Right Subclavian vein  · Stable  · Continue Eliquis 2 5 mg BID  · Continue Jakafi 10 mg Daily   · Outpatient f/u with Heme/Onc

## 2021-08-19 NOTE — ASSESSMENT & PLAN NOTE
Stable on exam today  On Room air 95%  Denies cp/sob/palpitations/dizziness  Continue Prednisone taper til 8/24/21  Did receive 2 doses of COVID vaccine

## 2021-08-19 NOTE — ASSESSMENT & PLAN NOTE
· Presented to ED July 15th 2021 with generalized weakness, dry cough, fever, diarrhea   · Did received COVID vaccine- 2 nd dose given 5/4/2021  · Required High Flow Nasal cannula during hospitalization   · Completed 5 day course of Remdesivir   · On Prednisone taper at STR  · Continue Prednisone 10 mg daily until 8/23/21  · Stable on room air at STR  · Denies SOB/Sore throat/Cough  · Diarrhea has resolved -received vancomyacin empericially  · Continue Probiotic daily (Florastor)   · Weakness improved with therapy  · Will benefit from home health PT/OT/SN

## 2021-08-19 NOTE — ASSESSMENT & PLAN NOTE
· Generalized weakness after COVID infection  · Independent and ambulates with RW at baseline  · Currently patient needs supervision/min assist for ADLs/IADLs   · Patient states she is feeling stronger with therapy  · Continue PT/OT  · Fall Precautions   · Would benefit from PT/OT at home at d/c from [facility]

## 2021-08-19 NOTE — ASSESSMENT & PLAN NOTE
· Stable  · PTH and Calcium levels stable  · Continue Vitamin D daily  · Continue levothyroxine daily   · Continue Calcitriol daily   · Outpatient f/u with Endocrinology

## 2021-08-19 NOTE — ASSESSMENT & PLAN NOTE
Lab Results   Component Value Date    EGFR 16 08/11/2021    EGFR 14 08/03/2021    EGFR 13 08/02/2021    CREATININE 2 77 08/11/2021    CREATININE 3 17 (H) 08/03/2021    CREATININE 3 27 (H) 08/02/2021     ·  Renal function panel done August 11, 2021 at facility  · BUN/creatinine 72/2 77  · GFR 16  · Calcium 7 9  · Phosphorus 4 3  · Stable  · Follows with Nephrology as Outpatient  · Not currently on dialysis  · S/P AV fistula Right Arm  +thrill  · Renally dose medications  · Continue calcitriol daily  · Continue sodium bicarbonate tablets daily

## 2021-08-19 NOTE — ASSESSMENT & PLAN NOTE
·  History of bladder mass with obstructive uropathy  · S/p cystectomy and ileal conduit October 2016 by Dr Sunny Morel  · Denies issues with urostomy at this time  · Patient is independent with urostomy care  · Outpatient follow-up with Urology at [facility] from [facility]

## 2021-08-19 NOTE — PROGRESS NOTES
[facility]  [address]  [phone]  [address] [place] [address] St: [place]  Code 31 (STR)    NAME: Rusty Maharaj  AGE: 77 y o  SEX: female   CODE STATUS: Full Code    DATE OF ADMISSION: 8/3/2021   DATE OF DISCHARGE: 8/20/2021   DISCHARGE DISPOSITION: Stable for d/c to home with home health PT/OT/SN    Reason for admission: Patient was admitted from [facility] for rehabilitation after hospitalization for COVID-19 viral infection  Course of stay:   Sarah Forbes is a pleasant 77year old female with Past medical hx including but not limited to 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), recurrent SBO, hypothyroidism, hyperparathyroidism, history of saddle PE (2006) on Eliquis, polycythemia vera, chronic thrombosis of right subclavian vein, HTN, CKD stage 5 not on hemodialysis s/p right upper extremity AVF (January 2021) seen in collaboration with nursing for medical management and discharge visit  Recent admission at [facility] July 15, 2021 to August 3, 2021 with weakness, ambulatory dysfunction, dry cough and diarrhea  She tested positive for COVID  Initially she was not hypoxic on admission however her respiratory status worsened and required high-flow oxygen  Patient had received 2 doses of COVID vaccine 2nd dose done on 5/4/2021  She was followed by Pulmonary- chest x-ray revealed bilateral Air space opacities, she was treated with remdesivir and completed a 5 day course as well as IV steroids and transitioned to oral taper  Regarding her diarrhea she was treated with vancomycin empirically  Patient did well at short-term rehab per PT/OT, initially was weak and required assistance and at time of discharge patient is independent with ADLs/IADLs and back to baseline  She has not requried any oxygen therapy at [facility]  Her diarrhea has resolved and her LBM was yesterday 8/18/21  She denies SOB/N/V  She denies pain   Patient states she feels much stronger  She states she has a good appetite and is eating 3 meals per day  Patient manages her ilioconduit independently  Denies urinary issues  She is eager to go home  No concerns from nursing at this time  ROS:  Review of Systems   Constitutional: Negative for chills, fatigue and fever  HENT: Negative for ear pain and sore throat  Eyes: Negative for pain and visual disturbance  Respiratory: Negative for cough and shortness of breath  Cardiovascular: Negative for chest pain and palpitations  Gastrointestinal: Negative for abdominal pain, blood in stool, diarrhea and nausea  Genitourinary: Negative for dysuria and hematuria  Musculoskeletal: Negative for arthralgias and back pain  Skin: Negative for color change and rash  Neurological: Positive for weakness  Negative for dizziness and seizures  Psychiatric/Behavioral: Negative for dysphoric mood and sleep disturbance  The patient is not nervous/anxious  All other systems reviewed and are negative  PHYSICAL EXAM:  VITALS: Temp 97 6° pulse 70 respirations 20 /84 O2 95% room air weight 194 lb    Physical Exam  Vitals and nursing note reviewed  Constitutional:       General: She is not in acute distress  Appearance: Normal appearance  She is obese  HENT:      Head: Normocephalic and atraumatic  Nose: No congestion or rhinorrhea  Mouth/Throat:      Mouth: Mucous membranes are moist    Eyes:      General: No scleral icterus  Conjunctiva/sclera: Conjunctivae normal       Pupils: Pupils are equal, round, and reactive to light  Cardiovascular:      Rate and Rhythm: Normal rate and regular rhythm  Pulses: Normal pulses  Heart sounds: Normal heart sounds  No murmur heard  Pulmonary:      Effort: Pulmonary effort is normal  No respiratory distress  Breath sounds: Normal breath sounds  No wheezing, rhonchi or rales     Abdominal:      General: Bowel sounds are normal  There is no distension  Palpations: Abdomen is soft  There is no mass  Tenderness: There is no abdominal tenderness  Hernia: No hernia is present  Genitourinary:     Comments: ilieal conduit draining- no s/s of infection  Musculoskeletal:         General: No swelling or tenderness  Comments: Right Arm AV Fistula- good thrill    Lymphadenopathy:      Cervical: No cervical adenopathy  Skin:     General: Skin is warm and dry  Coloration: Skin is not pale  Findings: No rash  Neurological:      General: No focal deficit present  Mental Status: She is alert and oriented to person, place, and time  Mental status is at baseline  Motor: Weakness present        Gait: Gait abnormal    Psychiatric:         Mood and Affect: Mood normal          Behavior: Behavior normal          Admission Diagnoses:   Problem List Items Addressed This Visit        Endocrine    Secondary hyperparathyroidism of renal origin (HCC)     · Stable  · PTH and Calcium levels stable  · Continue Vitamin D daily  · Continue levothyroxine daily   · Continue Calcitriol daily   · Outpatient f/u with Endocrinology             Cardiovascular and Mediastinum    Chronic saddle pulmonary embolism (HCC)     · Stable  · Denies cp/sob/lightheadedness/palpitations  · Continue Eliquis 2 5 mg BID            Genitourinary    CKD (chronic kidney disease), stage IV ([facility])     Lab Results   Component Value Date    EGFR 16 08/11/2021    EGFR 14 08/03/2021    EGFR 13 08/02/2021    CREATININE 2 77 08/11/2021    CREATININE 3 17 (H) 08/03/2021    CREATININE 3 27 (H) 08/02/2021     · Hx of CKD Stage 5- Not on HD s/p RUE AV Fistula placed Jan 2021)   · Follows with Nephrology- Dr Demetri Greer  · Right AVF +thrill - no edema noted  · Renally Dose medications  · Continue Calcitrol and Sodium BiCarb tablets             Other    Polycythemia vera (HCC)     · Hx of Saddle PE and chronic thrombosis of Right Subclavian vein  · Stable  · Continue Eliquis 2 5 mg BID  · Continue Jakafi 10 mg Daily   · Outpatient f/u with Heme/Onc          Depression - Primary     · Stable  · Denies SI/HI  · Did recently have one of her son's pass away from COVID  · Continue Celexa 20 mg daily   · Outpatient f/u with PCP at D/C from [facility]           Hyperlipemia     · Stable   · Continue statin   · Outpatient f/u with PCP at D/C from [facility]           COVID-19     · Presented to ED July 15th 2021 with generalized weakness, dry cough, fever, diarrhea   · Did received COVID vaccine- 2 nd dose given 5/4/2021  · Required High Flow Nasal cannula during hospitalization   · Completed 5 day course of Remdesivir   · On Prednisone taper at STR  · Continue Prednisone 10 mg daily until 8/23/21  · Stable on room air at STR  · Denies SOB/Sore throat/Cough  · Diarrhea has resolved -received vancomyacin empericially  · Continue Probiotic daily (Florastor)   · Weakness improved with therapy  · Will benefit from home health PT/OT/SN               Follow-up Recommendations:  Outpatient Follow up with PCP in the next 2 weeks, Nephrology appt scheduled 8/31/2021, IR appt scheduled 9/16/2021, Heme/Onc appt scheduled 9/22/21, Urology appt scheduled 10/8/2021    Labs and testing performed during stay:    Collection Date:08/11/2021 06:00  RENAL FUNCTION PANEL  GLUCOSE 72 mg/dL 65-99 Final  BUN 72 mg/dL 7-25 H Final  CREATININE 2 77 mg/dL 0 40-1 10 H Final  SODIUM 142 mmol/L 135-145 Final  POTASSIUM 4 3 mmol/L 3 5-5 2 Final  CHLORIDE 113 mmol/L 100-109 H Final  CARBON DIOXIDE 20 mmol/L 23-31 L Final  CALCIUM 7 9 mg/dL 8 5-10 1 L Final  ALBUMIN 2 3 g/dL 3 5-4 8 L Final  PHOSPHORUS 4 3 mg/dL 2 3-4 6 Final  ANION GAP 9 3-11 Final  eGFR, NON  AM 16 >60 L Final  eGFR,  AMER 19 >60 L Final     Collection Date:08/11/2021 06:00  PTH, INTACT 193 7 pg/mL 18 5-88 0 H Final     Collection Date: 8/12/2021 04:10  CBC WITH DIFF  HEMOGLOBIN 7 4 g/dL 11 5-14 5 L Final  HEMATOCRIT 22 1 % 35 0-43 0 L Final  WBC 3 4 thou/cmm 4  0-10 0 L Final  RBC 2 39 mill/cmm 3 70-4 70 L Final  PLATELET COUNT 377 thou/cmm 140-350 Final  MPV 8 1 fL 7 5-11 3 Final  MCV 92 fL  Final  MCH 30 7 pg 26 0-34 0 Final  MCHC 33 3 g/dL 32 0-37 0 Final  RDW 20 0 % 12 0-16 0 H Final  DIFFERENTIAL TYPE AUTO Final  ABSOLUTE NEUT 2 2 thou/cmm 1 8-7 8 Final  ABSOLUTE LYMPH 0 9 thou/cmm 1 0-3 0 L Final  ABSOLUTE MONO 0 2 thou/cmm 0 3-1 0 L Final  ABSOLUTE EOS 0 1 thou/cmm 0 0-0 5 Final  ABSOLUTE BASO 0 0 thou/cmm 0 0-0 1 Final  NEUTROPHILS 62 % Final  LYMPHOCYTES 27 % Final  MONOCYTES 7 % Final  EOSINOPHILS 3 % Final  BASOPHILS 1 % Final     Discharge Medications: See discharge medication list which was reviewed and signed  Current Outpatient Medications:     acetaminophen (TYLENOL) 325 mg tablet, 650 mg every 6 hours as needed for mild pain or headache, Disp: 30 tablet, Rfl: 0    apixaban (Eliquis) 2 5 mg, Take 1 tablet (2 5 mg total) by mouth 2 (two) times a day, Disp: 60 tablet, Rfl: 0    atorvastatin (LIPITOR) 40 mg tablet, Take 1 tablet (40 mg total) by mouth daily at bedtime, Disp: , Rfl:     calcitriol (ROCALTROL) 0 25 mcg capsule, Take 1 capsule (0 25 mcg total) by mouth daily, Disp: 30 capsule, Rfl: 5    Cholecalciferol (VITAMIN D3) 1000 UNITS CAPS, Take 1,000 unit marking on U-100 syringe by mouth daily  , Disp: , Rfl:     citalopram (CeleXA) 20 mg tablet, Take 20 mg by mouth daily , Disp: , Rfl:     docusate sodium (COLACE) 100 mg capsule, Take 1 capsule (100 mg total) by mouth 2 (two) times a day, Disp: 10 capsule, Rfl: 0    Jakafi 10 MG tablet, TAKE 1 TABLET (10 MG TOTAL) BY MOUTH DAILY, Disp: 30 tablet, Rfl: 3    levothyroxine 75 mcg tablet, Take 75 mcg by mouth daily, Disp: , Rfl: 3    loperamide (IMODIUM) 2 mg capsule, Take 1 capsule (2 mg total) by mouth 4 (four) times a day as needed for diarrhea, Disp: 12 capsule, Rfl: 0    melatonin 3 mg, Take 1 tablet (3 mg total) by mouth daily at bedtime, Disp: 30 tablet, Rfl: 0    metoprolol tartrate (LOPRESSOR) 25 mg tablet, Take 1 tablet (25 mg total) by mouth 2 (two) times a day, Disp: 60 tablet, Rfl: 0    polyethylene glycol (MIRALAX) 17 g packet, Take 17 g by mouth daily as needed (constipation), Disp: 10 each, Rfl: 0    predniSONE 20 mg tablet, Take 2 tablets (40 mg total) by mouth daily for 5 days, THEN 1 5 tablets (30 mg total) daily for 5 days, THEN 1 tablet (20 mg total) daily for 5 days, THEN 0 5 tablets (10 mg total) daily for 5 days  , Disp: 25 tablet, Rfl: 0    saccharomyces boulardii (FLORASTOR) 250 mg capsule, Take 1 capsule by mouth daily  , Disp: , Rfl:     sodium bicarbonate 650 mg tablet, Take 1 tablet (650 mg total) by mouth 2 (two) times daily after meals, Disp: , Rfl: 0     Discussion with patient/family and further instructions:  -Fall precautions  -Aspiration precautions  -Bleeding precautions  -Monitor for signs/symptoms of infection  -Medication list was reviewed and updated    Status at time of discharge: Stable    Plan discussed with Dr Isabell Wade noted agreement with assessment and plan  Billing based on time  Time spent on unit, 40 minutes  Time spent counseling pt on debility/condition, 30 minutes  Please note:  Voice-recognition software may have been used in the preparation of this document  Occasional wrong word or "sound-alike" substitutions may have occurred due to the inherent limitations of voice recognition software  Interpretation should be guided by context          VICTORINA Henning  3/91/631788:25 AM

## 2021-08-19 NOTE — ASSESSMENT & PLAN NOTE
· Stable  · Denies SI/HI  · Did recently have one of her son's pass away from COVID  · Continue Celexa 20 mg daily   · Outpatient f/u with PCP at D[facility]C from [facility]

## 2021-08-20 DIAGNOSIS — N18.5 ACUTE RENAL FAILURE SUPERIMPOSED ON STAGE 5 CHRONIC KIDNEY DISEASE, NOT ON CHRONIC DIALYSIS, UNSPECIFIED ACUTE RENAL FAILURE TYPE (HCC): ICD-10-CM

## 2021-08-20 DIAGNOSIS — Z71.89 COMPLEX CARE COORDINATION: Primary | ICD-10-CM

## 2021-08-20 DIAGNOSIS — N17.9 ACUTE RENAL FAILURE SUPERIMPOSED ON STAGE 5 CHRONIC KIDNEY DISEASE, NOT ON CHRONIC DIALYSIS, UNSPECIFIED ACUTE RENAL FAILURE TYPE (HCC): ICD-10-CM

## 2021-08-23 ENCOUNTER — PATIENT OUTREACH (OUTPATIENT)
Dept: CASE MANAGEMENT | Facility: HOSPITAL | Age: 76
End: 2021-08-23

## 2021-08-23 NOTE — PROGRESS NOTES
Call placed this afternoon to [facility] to inquire about VNA patient sent home with and labwork drawn while at facility  Per Reyes Obey, patient discharged home with VNA provided by [facility]  Reyes Obey placed patient on lengthy hold for which male party answered and CM introduced self and reason for call:  Need for lab work/BMP while at facility  Male party then placed CM on lengthy hold for which [facility] then answered  CM introduced self and request for creatinine / BMP drawn while at facility  Per Vincent Fletcher was provided with creatinine 2 77 and BUN 72 drawn 8/11  Call placed to [facility] VNA and spoke with Mimi Zhu who informed CM that the patient's son declined start of care when agency called on Saturday August 21st  He reportedly informed the agency that patient "was not safe at home" and took her with him back to his home in [place]  Son informed the agency he Expects the patient to stay with him for at least a week and will call when patient returns to her home  Unsuccessful attempt at reaching patient's son Reji Olivier  Left name, call back number and message requesting return call of this CM  This CM will continue to monitor electronically via chart review, outreach and [facility]

## 2021-08-26 ENCOUNTER — PATIENT OUTREACH (OUTPATIENT)
Dept: CASE MANAGEMENT | Facility: HOSPITAL | Age: 76
End: 2021-08-26

## 2021-08-26 NOTE — PROGRESS NOTES
CM contacted patient's son Elizabeth Ivan  to introduce self, explain the role of the OP CM and purpose of this phone call is to assess patient's general wellbeing or for any assistance needed with follow-up care  He is agreeable to future outreach of this CM and reports patient is doing well  Son states he has the nurses name and number from [facility] to call when patient returns home  He states that she is enjoying her time there with her grandchildren and will her decide when it's time to go home  Elizabeth Ivan denies any weight gain, edema or SOB of patient  He reports compliance with diet and medications  He is agreeable to this CM outreaching again next week and suspects patient may wants to return home over the weekend

## 2021-09-03 ENCOUNTER — PATIENT OUTREACH (OUTPATIENT)
Dept: CASE MANAGEMENT | Facility: HOSPITAL | Age: 76
End: 2021-09-03

## 2021-09-03 NOTE — PROGRESS NOTES
Chart review completed  Outside records through [facility] requested and refreshed  Future appointments:   9/15/2021 10:00 AM Appointment with Lora Gallo MD at [address] [facility]"" ([phone] 9/16/2021 10:00 AM Appointment with THANIA VIERA 2 (BIPLANE) at [facility] Interventional Radiology [phone] 9/22/2021 11:00 AM Appointment with Shital Mariscal PA-C at [facility] ([phone] 10/8/2021 10:00 AM Appointment with VICTORINA Dugan at [place] [address] ([phone])      Spoke with son today who reports patient returned home last weekend  He states VNA started on Monday and she has had someone in her home nearly every day  CM review above appts and he replied that she has a neighbor she is close with the often provides transportation if needed  Chart review completed  CM contacted patient to introduce self, explain the role of the OP CM and purpose of this phone call is to assess patient's general wellbeing or for any assistance needed with follow-up care  Florinda Cevallos consented to future outreach of this CM  Patient does not weigh self regularly or check blood pressure regularly nor is she interested  did encourage patient to keep an eye on weight because any subtle weight gain can be an indication of water retention  Patient may also see symptoms of shortness of breath or swelling which she acknowledged  She does report that the visiting nurses do check her blood pressure several times during each visit before and after activity  She reports compliance with her medication stating she does avoid NSAIDs  She denies any changes in appetite and acknowledges the above appointments           This CM will continue to monitor electronically via chart review, outreach and [facility]

## 2021-09-08 ENCOUNTER — TELEPHONE (OUTPATIENT)
Dept: RADIOLOGY | Facility: HOSPITAL | Age: 76
End: 2021-09-08

## 2021-09-09 ENCOUNTER — APPOINTMENT (OUTPATIENT)
Dept: LAB | Facility: CLINIC | Age: 76
End: 2021-09-09
Payer: COMMERCIAL

## 2021-09-09 DIAGNOSIS — D63.1 ANEMIA IN STAGE 5 CHRONIC KIDNEY DISEASE, NOT ON CHRONIC DIALYSIS (HCC): ICD-10-CM

## 2021-09-09 DIAGNOSIS — N17.9 ACUTE RENAL FAILURE SUPERIMPOSED ON STAGE 5 CHRONIC KIDNEY DISEASE, NOT ON CHRONIC DIALYSIS, UNSPECIFIED ACUTE RENAL FAILURE TYPE (HCC): ICD-10-CM

## 2021-09-09 DIAGNOSIS — N18.5 ACUTE RENAL FAILURE SUPERIMPOSED ON STAGE 5 CHRONIC KIDNEY DISEASE, NOT ON CHRONIC DIALYSIS, UNSPECIFIED ACUTE RENAL FAILURE TYPE (HCC): ICD-10-CM

## 2021-09-09 DIAGNOSIS — N18.5 ANEMIA IN STAGE 5 CHRONIC KIDNEY DISEASE, NOT ON CHRONIC DIALYSIS (HCC): ICD-10-CM

## 2021-09-09 DIAGNOSIS — N25.81 SECONDARY HYPERPARATHYROIDISM OF RENAL ORIGIN (HCC): ICD-10-CM

## 2021-09-09 DIAGNOSIS — N18.5 STAGE 5 CHRONIC KIDNEY DISEASE NOT ON CHRONIC DIALYSIS (HCC): ICD-10-CM

## 2021-09-09 DIAGNOSIS — N18.5 CKD (CHRONIC KIDNEY DISEASE) STAGE 5, GFR LESS THAN 15 ML/MIN (HCC): ICD-10-CM

## 2021-09-10 ENCOUNTER — APPOINTMENT (OUTPATIENT)
Dept: LAB | Facility: HOSPITAL | Age: 76
End: 2021-09-10
Attending: INTERNAL MEDICINE
Payer: COMMERCIAL

## 2021-09-10 ENCOUNTER — PATIENT OUTREACH (OUTPATIENT)
Dept: CASE MANAGEMENT | Facility: HOSPITAL | Age: 76
End: 2021-09-10

## 2021-09-10 LAB
ALBUMIN SERPL BCP-MCNC: 2.9 G/DL (ref 3.5–5)
ANION GAP SERPL CALCULATED.3IONS-SCNC: 5 MMOL/L (ref 4–13)
BASOPHILS # BLD AUTO: 0.07 THOUSANDS/ΜL (ref 0–0.1)
BASOPHILS NFR BLD AUTO: 1 % (ref 0–1)
BUN SERPL-MCNC: 38 MG/DL (ref 5–25)
CALCIUM ALBUM COR SERPL-MCNC: 9.4 MG/DL (ref 8.3–10.1)
CALCIUM SERPL-MCNC: 8.5 MG/DL (ref 8.3–10.1)
CHLORIDE SERPL-SCNC: 110 MMOL/L (ref 100–108)
CO2 SERPL-SCNC: 22 MMOL/L (ref 21–32)
CREAT SERPL-MCNC: 3.42 MG/DL (ref 0.6–1.3)
EOSINOPHIL # BLD AUTO: 0.09 THOUSAND/ΜL (ref 0–0.61)
EOSINOPHIL NFR BLD AUTO: 2 % (ref 0–6)
ERYTHROCYTE [DISTWIDTH] IN BLOOD BY AUTOMATED COUNT: 15.9 % (ref 11.6–15.1)
GFR SERPL CREATININE-BSD FRML MDRD: 12 ML/MIN/1.73SQ M
GLUCOSE P FAST SERPL-MCNC: 89 MG/DL (ref 65–99)
HCT VFR BLD AUTO: 26.5 % (ref 34.8–46.1)
HGB BLD-MCNC: 8.1 G/DL (ref 11.5–15.4)
IMM GRANULOCYTES # BLD AUTO: 0.07 THOUSAND/UL (ref 0–0.2)
IMM GRANULOCYTES NFR BLD AUTO: 1 % (ref 0–2)
LYMPHOCYTES # BLD AUTO: 1.31 THOUSANDS/ΜL (ref 0.6–4.47)
LYMPHOCYTES NFR BLD AUTO: 23 % (ref 14–44)
MCH RBC QN AUTO: 30 PG (ref 26.8–34.3)
MCHC RBC AUTO-ENTMCNC: 30.6 G/DL (ref 31.4–37.4)
MCV RBC AUTO: 98 FL (ref 82–98)
MONOCYTES # BLD AUTO: 0.33 THOUSAND/ΜL (ref 0.17–1.22)
MONOCYTES NFR BLD AUTO: 6 % (ref 4–12)
NEUTROPHILS # BLD AUTO: 3.91 THOUSANDS/ΜL (ref 1.85–7.62)
NEUTS SEG NFR BLD AUTO: 67 % (ref 43–75)
NRBC BLD AUTO-RTO: 0 /100 WBCS
PHOSPHATE SERPL-MCNC: 3.9 MG/DL (ref 2.3–4.1)
PLATELET # BLD AUTO: 389 THOUSANDS/UL (ref 149–390)
PMV BLD AUTO: 9.7 FL (ref 8.9–12.7)
POTASSIUM SERPL-SCNC: 4.2 MMOL/L (ref 3.5–5.3)
PTH-INTACT SERPL-MCNC: 167.4 PG/ML (ref 18.4–80.1)
RBC # BLD AUTO: 2.7 MILLION/UL (ref 3.81–5.12)
SODIUM SERPL-SCNC: 137 MMOL/L (ref 136–145)
WBC # BLD AUTO: 5.78 THOUSAND/UL (ref 4.31–10.16)

## 2021-09-10 PROCEDURE — 85025 COMPLETE CBC W/AUTO DIFF WBC: CPT

## 2021-09-10 PROCEDURE — 80069 RENAL FUNCTION PANEL: CPT

## 2021-09-10 PROCEDURE — 83970 ASSAY OF PARATHORMONE: CPT

## 2021-09-10 PROCEDURE — 36415 COLL VENOUS BLD VENIPUNCTURE: CPT

## 2021-09-10 NOTE — PROGRESS NOTES
Chart review completed  Outside records through [facility] requested and refreshed  Future appointments:   9/15/2021 10:00 AM Appointment with Julio C Ruiz MD at [address] [facility] ([phone] 9/16/2021 10:00 AM Appointment with THANIA VIERA 2 (BIPLANE) at [address] [place] [address] Interventional Radiology [phone] 9/22/2021 11:00 AM Appointment with Jennie Arriola PA-C at [facility] ([phone] 10/8/2021 10:00 AM Appointment with VICTORINA Patel at [place] [address] ([phone])      Spoke with patient this AM who offers no complaints  She does not believe she's gained any weight (does not weigh self regularly) and denies any shortness of breath or appetite changes  She does admit to still having edema in both lower extremities, the left being worse than the right  She denies any pain or discomfort in her legs and was instructed to call if she were to develop pain worsening of swelling or drainage  She reports compliance with taking all her prescribed medications and acknowledges next weeks appts  Brandee Heath states she will be getting her flu shot once the PCP office receives supplies  States she will be giving him a call again shortly to follow up  CM offered assistance as needed and patient agreed to let CM know if any assistance is needed with scheduling such appointment           This CM will continue to monitor electronically via chart review, outreach and [place]

## 2021-09-15 ENCOUNTER — OFFICE VISIT (OUTPATIENT)
Dept: NEPHROLOGY | Facility: CLINIC | Age: 76
End: 2021-09-15
Payer: COMMERCIAL

## 2021-09-15 VITALS
HEART RATE: 70 BPM | BODY MASS INDEX: 35.53 KG/M2 | DIASTOLIC BLOOD PRESSURE: 78 MMHG | WEIGHT: 181 LBS | SYSTOLIC BLOOD PRESSURE: 128 MMHG | HEIGHT: 60 IN

## 2021-09-15 DIAGNOSIS — I10 ESSENTIAL HYPERTENSION: ICD-10-CM

## 2021-09-15 DIAGNOSIS — E66.01 OBESITY, MORBID (HCC): ICD-10-CM

## 2021-09-15 DIAGNOSIS — E66.01 CLASS 2 SEVERE OBESITY DUE TO EXCESS CALORIES WITH SERIOUS COMORBIDITY AND BODY MASS INDEX (BMI) OF 35.0 TO 35.9 IN ADULT (HCC): ICD-10-CM

## 2021-09-15 DIAGNOSIS — D63.1 ANEMIA IN STAGE 5 CHRONIC KIDNEY DISEASE, NOT ON CHRONIC DIALYSIS (HCC): ICD-10-CM

## 2021-09-15 DIAGNOSIS — N18.5 ANEMIA IN STAGE 5 CHRONIC KIDNEY DISEASE, NOT ON CHRONIC DIALYSIS (HCC): ICD-10-CM

## 2021-09-15 DIAGNOSIS — N25.81 SECONDARY HYPERPARATHYROIDISM OF RENAL ORIGIN (HCC): ICD-10-CM

## 2021-09-15 DIAGNOSIS — N11.1 OBSTRUCTIVE PYELONEPHRITIS: ICD-10-CM

## 2021-09-15 DIAGNOSIS — N13.9 OBSTRUCTIVE UROPATHY: ICD-10-CM

## 2021-09-15 DIAGNOSIS — N18.5 STAGE 5 CHRONIC KIDNEY DISEASE NOT ON CHRONIC DIALYSIS (HCC): Primary | ICD-10-CM

## 2021-09-15 DIAGNOSIS — D45 POLYCYTHEMIA VERA (HCC): ICD-10-CM

## 2021-09-15 DIAGNOSIS — N13.30 HYDRONEPHROSIS OF LEFT KIDNEY: ICD-10-CM

## 2021-09-15 PROCEDURE — 99214 OFFICE O/P EST MOD 30 MIN: CPT | Performed by: INTERNAL MEDICINE

## 2021-09-15 NOTE — PROGRESS NOTES
OFFICE FOLLOW UP - Nephrology   Monica Conklin 77 y o  female MRN: [id number]       ASSESSMENT and PLAN:  Lyly Chen was seen today for follow-up and chronic kidney disease  Diagnoses and all orders for this visit:    Stage 5 chronic kidney disease not on chronic dialysis ([facility] [address] )  -     CBC; Future  -     Renal function panel; Future  -     PTH, intact; Future    Hydronephrosis of left kidney    Obstructive left pyelonephritis    Obstructive uropathy    Polycythemia vera (HCC)    Obesity, morbid (HCC)    Class 2 severe obesity due to excess calories with serious comorbidity and body mass index (BMI) of 35 0 to 35 9 in adult [facility])    Anemia in stage 5 chronic kidney disease, not on chronic dialysis [facility])    Essential hypertension    Secondary hyperparathyroidism of renal origin [facility])        This is a 21-year-old lady with history of chronic kidney disease stage 5 with creatinine lately in the low to mid 3s, history of urinary incontinence, bilateral hydronephrosis secondary to obstructive uropathy and 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 in October 2016,  hospitalized 07-08/2021 with COVID-19 infection who returns to the office for hospital follow-up  1  Chronic kidney disease stage 5 not on dialysis  In the setting of obstructive uropathy, functional solitary right kidney, episode of acute kidney injury  Frtunately her renal function remains stable in the mid threes, patient without significant uremic symptoms  Status post AV fistula creation on 01/05/2021 by Dr Chairez, will contact vascular surgery for follow-up  I would like to see her back in the office in 10 weeks with repeat blood and urine test   Once again discussed with patient about signs or symptoms to look for to contact us or to go to the emergency department    2  Hypertension, blood pressure acceptable, follow low-salt diet, no changes on medication at this moment       3  History of nonfunctional bladder causing bilateral hydronephrosis status in 2 months with repeat labs post supra trigonal cystectomy and ileal conduit 10/2016, continue follow-up with urologist     4  Polycythemia area, previous history of PE, on Eliquis, continue follow-up with Hematology follow-up, Dr Jarret Navarro  Anemia of chronic disease  Most recent hemoglobin low at 8 1,  JENELLE as per Hematology-Oncology    5  Mineral bone disease, PTH acceptable for her degree of kidney dysfunction, continue with Calcitrol 1 tablet 3 times a week (Monday, Wednesday, Friday), follow labs in 10 weeks    6  Hyperlipidemia, on statins by Cardiology    7  Mietabolic acidosis, stable, continue with sodium bicarbonate  8  History of PE, on Eliquis    9  Access, right brachiocephalic AV fistula created on 01/05/2021 by Dr Alcira Hutson Doctor  Status post fistulogram with high-grade stenosis in the subclavian vein status post angioplasty on 02/10/2021  Continue follow-up with vascular surgery  Patient Instructions   As we discussed in the office visit, you have advanced chronic kidney disease  Based on your most recent blood test your kidney function remains stable  There is no urgent indication to start dialysis at this moment  I would like to see you back in the office in 8-10 weeks with repeat blood test  Continue with close follow-up with primary care doctor, hematologist, vascular surgeon  Remember to follow low-salt diet  No changes in medication at this moment  You develop any decreased appetite, nausea, vomiting, taste changes, please contact me or go to the emergency department          HPI: Darnell Hare is a 77 y o  female who is here for Follow-up and Chronic Kidney Disease    Hospitalized in 10/2018 due to a small bowel obstruction that resolved spontaneously  Hospitalized early 01/2019 status post fall, found to have a left-sided subdural hematoma, creatinine on admission 3 0 that decreased to 2 5 after intravenously fluids    Hospitalized 2019 after status post fall complicated with T-spine compression fracture, found to have sepsis secondary to UTI, also developed acute kidney injury on top of CKD, creatinine on admission was 3 68, renal function improved and creatinine went down to 2 59 on discharge day  Hospitalized 2020 with severe acute kidney injury in the setting of incarcerated ileal conduit parastomal hernia status post ex lap and parastomal hernia repair  Renal function improved with creatinine down to the low 3s  Status post right brachiocephalic AV fistula creation on 2021  Hospitalized between -2021 in [facility] due to gross hematuria, found to have new left hydronephrosis s/p left PCN  Last time seen was 2021 by our physician assistant, today returns for hospital follow up  Unfortunately since last office visit, patient was hospitalized at [facility] between 7/15 to 8/3/2021 with fever and shortness of breath, found to have COVID-19 pneumonia, renal function remained stable realization  Patient is unvaccinated  Unfortunately patient's son  due to COVID-19 infection    Today patient returns to the office for hospital follow-up, feeling tired specially with minimal activity  Denies any chest pain, no shortness of breath at rest, she has some lower extremity edema  Denies any abdominal pain, no nausea, no vomiting, or diarrhea  Notices ileal conduit urine output stable and clear  Denies any NSAID use  Most recent blood test on 09/10/2021 reviewed, renal function is stable with a creatinine at 3 42, hemoglobin 8 1, bicarb 22      ROS: All the systems were reviewed and were negative except as documented on the H&P            Allergies: Chlorhexidine    Medications:   Current Outpatient Medications:     acetaminophen (TYLENOL) 325 mg tablet, 650 mg every 6 hours as needed for mild pain or headache, Disp: 30 tablet, Rfl: 0    apixaban (Eliquis) 2 5 mg, Take 1 tablet (2 5 mg total) by mouth 2 (two) times a day, Disp: 28 tablet, Rfl: 0    atorvastatin (LIPITOR) 40 mg tablet, Take 1 tablet (40 mg total) by mouth daily at bedtime, Disp: 14 tablet, Rfl: 0    calcitriol (ROCALTROL) 0 25 mcg capsule, Take 1 capsule (0 25 mcg total) by mouth daily, Disp: 14 capsule, Rfl: 0    Cholecalciferol (VITAMIN D3) 1000 UNITS CAPS, Take 1,000 unit marking on U-100 syringe by mouth daily  , Disp: , Rfl:     citalopram (CeleXA) 20 mg tablet, Take 1 tablet (20 mg total) by mouth daily, Disp: 14 tablet, Rfl: 0    docusate sodium (COLACE) 100 mg capsule, Take 1 capsule (100 mg total) by mouth 2 (two) times a day, Disp: 10 capsule, Rfl: 0    Jakafi 10 MG tablet, TAKE 1 TABLET (10 MG TOTAL) BY MOUTH DAILY, Disp: 30 tablet, Rfl: 3    levothyroxine 75 mcg tablet, Take 1 tablet (75 mcg total) by mouth daily, Disp: 14 tablet, Rfl: 0    loperamide (IMODIUM) 2 mg capsule, Take 1 capsule (2 mg total) by mouth 4 (four) times a day as needed for diarrhea, Disp: 12 capsule, Rfl: 0    melatonin 3 mg, Take 1 tablet (3 mg total) by mouth daily at bedtime, Disp: 30 tablet, Rfl: 0    metoprolol tartrate (LOPRESSOR) 25 mg tablet, Take 1 tablet (25 mg total) by mouth 2 (two) times a day, Disp: 28 tablet, Rfl: 0    polyethylene glycol (MIRALAX) 17 g packet, Take 17 g by mouth daily as needed (constipation), Disp: 10 each, Rfl: 0    saccharomyces boulardii (Florastor) 250 mg capsule, Take 1 capsule (250 mg total) by mouth daily, Disp: 14 capsule, Rfl: 0    sodium bicarbonate 650 mg tablet, Take 1 tablet (650 mg total) by mouth 2 (two) times daily after meals, Disp: 28 tablet, Rfl: 0    Past Medical History:   Diagnosis Date    Anxiety     Bowel obstruction (HCC)     Chronic kidney disease (CKD), stage IV (severe) (HCC)     stage IV    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    Circulation problem     Compression fracture of cervical spine (HCC)     Hernia of abdominal cavity     History of kidney problems     Hydronephrosis     Hypertension     IBS (irritable bowel syndrome)     Incontinence     Lung mass     Improving on PET/CT 1/2016    Polycythemia vera ([facility] [address] )     Pulmonary embolism ([facility] [address] ) 2014    Shingles     Urinary tract infection      Past Surgical History:   Procedure Laterality Date    ABDOMINAL ADHESION SURGERY N/A 8/9/2020    Procedure: LYSIS ADHESIONS;  Surgeon: Samantha Nascimento DO;  [facility];  Service: General    BLADDER SUSPENSION      BOTOX INJECTION N/A 7/27/2016    Procedure: BOTOX INJECTION ;  Surgeon: Jorge A Silverman MD;  [facility];  Service:     CHOLECYSTECTOMY N/A     COLONOSCOPY      CYSTECTOMY, RADICAL WITH ILEOCONDUIT N/A 10/4/2016    Procedure: Terrall Kanner WITH ILEAL CONDUIT ;  Surgeon: Jorge A Silverman MD;  [facility];  Service:    Toshia Rodriguezson W/ RETROGRADES Bilateral 7/27/2016    Procedure: CYSTOSCOPY; RETROGRADE PYELOGRAM ;  Surgeon: Jorge A Silverman MD;  [facility];  Service:     HERNIA REPAIR      IR AV FISTULAGRAM/GRAFTOGRAM  2/10/2021    IR NEPHROSTOMY TO NEPHROURETERAL STENT  5/15/2021    IR NEPHROSTOMY TO NEPHROURETERAL STENT  6/9/2021    IR NEPHROSTOMY TUBE CHECK AND/OR REMOVAL  6/16/2021    IR NEPHROSTOMY TUBE CHECK/CHANGE/REPOSITION/REINSERTION/UPSIZE  5/14/2021    IR NEPHROSTOMY TUBE CHECK/CHANGE/REPOSITION/REINSERTION/UPSIZE  5/21/2021    IR NEPHROSTOMY TUBE PLACEMENT  5/10/2021    IR NON-TUNNELED CENTRAL LINE PLACEMENT  7/17/2020    IR NON-TUNNELED CENTRAL LINE PLACEMENT  8/14/2020    IR NON-TUNNELED CENTRAL LINE PLACEMENT  7/16/2021    LAPAROTOMY N/A 8/9/2020    Procedure: LAPAROTOMY EXPLORATORY, PARASTOMAL HERNIA REPAIR WITH MESH;  Surgeon: Samantha Nascimento DO;  [facility];  Service: General    WV ANASTOMOSIS,AV,ANY SITE Right 1/5/2021    Procedure: Creation of right brachiobasilic fistula;   Surgeon: Angelique Christianson MD;  [facility]; Service: Vascular    WA COLONOSCOPY FLX DX W/COLLJ SPEC WHEN PFRMD N/A 8/31/2016    Procedure: COLONOSCOPY;  Surgeon: Manolo Mcintyre MD;  Location: [facility]; Service: Gastroenterology    WA CYSTOSCOPY,INSERT URETERAL STENT Bilateral 7/27/2016    Procedure: STENT INSERTION; EXCISION OF MESH ;  Surgeon: Adela Ureña MD;  [facility];  Service: Urology    TONSILLECTOMY      TUBAL LIGATION      WISDOM TOOTH EXTRACTION       Family History   Problem Relation Age of Onset    Cancer Mother         small cell cancer     Heart disease Father     COPD Father     Heart disease Brother     Nephrolithiasis Brother       reports that she has never smoked  She has never used smokeless tobacco  She reports previous alcohol use  She reports previous drug use  Physical Exam:   Vitals:    09/15/21 1000   BP: 128/78   BP Location: Left arm   Patient Position: Sitting   Cuff Size: Standard   Pulse: 70   Weight: 82 1 kg (181 lb)   Height: 5' (1 524 m)     Body mass index is 35 35 kg/m²      General: conscious, cooperative, in not acute distress  Eyes: conjunctivae pink, anicteric sclerae  ENT: lips and mucous membranes moist  Neck: supple, no JVD  Chest: clear breath sounds bilateral, no crackles, ronchus or wheezings  CVS: distinct S1 & S2, normal rate, regular rhythm  Abdomen: obese, non-tender, non-distended, normoactive bowel sounds  Back: no CVA tenderness  Extremities: no significant edema of both legs, right arm swelling with AV fistula with positive thrill and bruit  Skin: no rash  Neuro: awake, alert, oriented            Laboratory Results:  Lab Results   Component Value Date    WBC 5 78 09/10/2021    HGB 8 1 (L) 09/10/2021    HCT 26 5 (L) 09/10/2021    MCV 98 09/10/2021     09/10/2021     Lab Results   Component Value Date    SODIUM 137 09/10/2021    K 4 2 09/10/2021     (H) 09/10/2021    CO2 22 09/10/2021    BUN 38 (H) 09/10/2021    CREATININE 3 42 (H) 09/10/2021    GLUC 72 08/11/2021 CALCIUM 8 5 09/10/2021       Lab Results   Component Value Date     4 (H) 09/10/2021    CALCIUM 8 5 09/10/2021    PHOS 3 9 09/10/2021             Portions of the record may have been created with voice recognition software  Occasional wrong word or "sound a like" substitutions may have occurred due to the inherent limitations of voice recognition software  Read the chart carefully and recognize, using context, where substitutions have occurred  If you have any questions, please contact the dictating provider

## 2021-09-15 NOTE — H&P (VIEW-ONLY)
OFFICE FOLLOW UP - Nephrology   Eliana Jamil 77 y o  female MRN: [id number]       ASSESSMENT and PLAN:  Grant Birmingham was seen today for follow-up and chronic kidney disease  Diagnoses and all orders for this visit:    Stage 5 chronic kidney disease not on chronic dialysis ([facility] [address] )  -     CBC; Future  -     Renal function panel; Future  -     PTH, intact; Future    Hydronephrosis of left kidney    Obstructive left pyelonephritis    Obstructive uropathy    Polycythemia vera (HCC)    Obesity, morbid (HCC)    Class 2 severe obesity due to excess calories with serious comorbidity and body mass index (BMI) of 35 0 to 35 9 in adult [facility])    Anemia in stage 5 chronic kidney disease, not on chronic dialysis [facility])    Essential hypertension    Secondary hyperparathyroidism of renal origin [facility])        This is a 71-year-old lady with history of chronic kidney disease stage 5 with creatinine lately in the low to mid 3s, history of urinary incontinence, bilateral hydronephrosis secondary to obstructive uropathy and 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 in October 2016,  hospitalized 07-08/2021 with COVID-19 infection who returns to the office for hospital follow-up  1  Chronic kidney disease stage 5 not on dialysis  In the setting of obstructive uropathy, functional solitary right kidney, episode of acute kidney injury  Frtunately her renal function remains stable in the mid threes, patient without significant uremic symptoms  Status post AV fistula creation on 01/05/2021 by Dr Chairez, will contact vascular surgery for follow-up  I would like to see her back in the office in 10 weeks with repeat blood and urine test   Once again discussed with patient about signs or symptoms to look for to contact us or to go to the emergency department    2  Hypertension, blood pressure acceptable, follow low-salt diet, no changes on medication at this moment       3  History of nonfunctional bladder causing bilateral hydronephrosis status in 2 months with repeat labs post supra trigonal cystectomy and ileal conduit 10/2016, continue follow-up with urologist     4  Polycythemia area, previous history of PE, on Eliquis, continue follow-up with Hematology follow-up, Dr Leslie Palm  Anemia of chronic disease  Most recent hemoglobin low at 8 1,  JENELLE as per Hematology-Oncology    5  Mineral bone disease, PTH acceptable for her degree of kidney dysfunction, continue with Calcitrol 1 tablet 3 times a week (Monday, Wednesday, Friday), follow labs in 10 weeks    6  Hyperlipidemia, on statins by Cardiology    7  Mietabolic acidosis, stable, continue with sodium bicarbonate  8  History of PE, on Eliquis    9  Access, right brachiocephalic AV fistula created on 01/05/2021 by Dr Oro Doctor  Status post fistulogram with high-grade stenosis in the subclavian vein status post angioplasty on 02/10/2021  Continue follow-up with vascular surgery  Patient Instructions   As we discussed in the office visit, you have advanced chronic kidney disease  Based on your most recent blood test your kidney function remains stable  There is no urgent indication to start dialysis at this moment  I would like to see you back in the office in 8-10 weeks with repeat blood test  Continue with close follow-up with primary care doctor, hematologist, vascular surgeon  Remember to follow low-salt diet  No changes in medication at this moment  You develop any decreased appetite, nausea, vomiting, taste changes, please contact me or go to the emergency department          HPI: Angie Bill is a 77 y o  female who is here for Follow-up and Chronic Kidney Disease    Hospitalized in 10/2018 due to a small bowel obstruction that resolved spontaneously  Hospitalized early 01/2019 status post fall, found to have a left-sided subdural hematoma, creatinine on admission 3 0 that decreased to 2 5 after intravenously fluids    Hospitalized 2019 after status post fall complicated with T-spine compression fracture, found to have sepsis secondary to UTI, also developed acute kidney injury on top of CKD, creatinine on admission was 3 68, renal function improved and creatinine went down to 2 59 on discharge day  Hospitalized 2020 with severe acute kidney injury in the setting of incarcerated ileal conduit parastomal hernia status post ex lap and parastomal hernia repair  Renal function improved with creatinine down to the low 3s  Status post right brachiocephalic AV fistula creation on 2021  Hospitalized between -2021 in [facility] due to gross hematuria, found to have new left hydronephrosis s/p left PCN  Last time seen was 2021 by our physician assistant, today returns for hospital follow up  Unfortunately since last office visit, patient was hospitalized at [facility] between 7/15 to 8/3/2021 with fever and shortness of breath, found to have COVID-19 pneumonia, renal function remained stable realization  Patient is unvaccinated  Unfortunately patient's son  due to COVID-19 infection    Today patient returns to the office for hospital follow-up, feeling tired specially with minimal activity  Denies any chest pain, no shortness of breath at rest, she has some lower extremity edema  Denies any abdominal pain, no nausea, no vomiting, or diarrhea  Notices ileal conduit urine output stable and clear  Denies any NSAID use  Most recent blood test on 09/10/2021 reviewed, renal function is stable with a creatinine at 3 42, hemoglobin 8 1, bicarb 22      ROS: All the systems were reviewed and were negative except as documented on the H&P            Allergies: Chlorhexidine    Medications:   Current Outpatient Medications:     acetaminophen (TYLENOL) 325 mg tablet, 650 mg every 6 hours as needed for mild pain or headache, Disp: 30 tablet, Rfl: 0    apixaban (Eliquis) 2 5 mg, Take 1 tablet (2 5 mg total) by mouth 2 (two) times a day, Disp: 28 tablet, Rfl: 0    atorvastatin (LIPITOR) 40 mg tablet, Take 1 tablet (40 mg total) by mouth daily at bedtime, Disp: 14 tablet, Rfl: 0    calcitriol (ROCALTROL) 0 25 mcg capsule, Take 1 capsule (0 25 mcg total) by mouth daily, Disp: 14 capsule, Rfl: 0    Cholecalciferol (VITAMIN D3) 1000 UNITS CAPS, Take 1,000 unit marking on U-100 syringe by mouth daily  , Disp: , Rfl:     citalopram (CeleXA) 20 mg tablet, Take 1 tablet (20 mg total) by mouth daily, Disp: 14 tablet, Rfl: 0    docusate sodium (COLACE) 100 mg capsule, Take 1 capsule (100 mg total) by mouth 2 (two) times a day, Disp: 10 capsule, Rfl: 0    Jakafi 10 MG tablet, TAKE 1 TABLET (10 MG TOTAL) BY MOUTH DAILY, Disp: 30 tablet, Rfl: 3    levothyroxine 75 mcg tablet, Take 1 tablet (75 mcg total) by mouth daily, Disp: 14 tablet, Rfl: 0    loperamide (IMODIUM) 2 mg capsule, Take 1 capsule (2 mg total) by mouth 4 (four) times a day as needed for diarrhea, Disp: 12 capsule, Rfl: 0    melatonin 3 mg, Take 1 tablet (3 mg total) by mouth daily at bedtime, Disp: 30 tablet, Rfl: 0    metoprolol tartrate (LOPRESSOR) 25 mg tablet, Take 1 tablet (25 mg total) by mouth 2 (two) times a day, Disp: 28 tablet, Rfl: 0    polyethylene glycol (MIRALAX) 17 g packet, Take 17 g by mouth daily as needed (constipation), Disp: 10 each, Rfl: 0    saccharomyces boulardii (Florastor) 250 mg capsule, Take 1 capsule (250 mg total) by mouth daily, Disp: 14 capsule, Rfl: 0    sodium bicarbonate 650 mg tablet, Take 1 tablet (650 mg total) by mouth 2 (two) times daily after meals, Disp: 28 tablet, Rfl: 0    Past Medical History:   Diagnosis Date    Anxiety     Bowel obstruction (HCC)     Chronic kidney disease (CKD), stage IV (severe) (HCC)     stage IV    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    Circulation problem     Compression fracture of cervical spine (HCC)     Hernia of abdominal cavity     History of kidney problems     Hydronephrosis     Hypertension     IBS (irritable bowel syndrome)     Incontinence     Lung mass     Improving on PET/CT 1/2016    Polycy[facility]mia vera ([facility] [address] )     Pulmonary embolism ([facility] [address] ) 2014    Shingles     Urinary tract infection      Past Surgical History:   Procedure Laterality Date    ABDOMINAL ADHESION SURGERY N/A 8/9/2020    Procedure: LYSIS ADHESIONS;  Surgeon: Samantha Nascimento DO;  [facility];  Service: General    BLADDER SUSPENSION      BOTOX INJECTION N/A 7/27/2016    Procedure: BOTOX INJECTION ;  Surgeon: Jorge A Silverman MD;  [facility];  Service:     CHOLECYSTECTOMY N/A     COLONOSCOPY      CYSTECTOMY, RADICAL WITH ILEOCONDUIT N/A 10/4/2016    Procedure: Terrall Kanner WITH ILEAL CONDUIT ;  Surgeon: Jorge A Silverman MD;  [facility];  Service:    Toshia Rodriguezson W/ RETROGRADES Bilateral 7/27/2016    Procedure: CYSTOSCOPY; RETROGRADE PYELOGRAM ;  Surgeon: Jorge A Silverman MD;  [facility];  Service:     HERNIA REPAIR      IR AV FISTULAGRAM/GRAFTOGRAM  2/10/2021    IR NEPHROSTOMY TO NEPHROURETERAL STENT  5/15/2021    IR NEPHROSTOMY TO NEPHROURETERAL STENT  6/9/2021    IR NEPHROSTOMY TUBE CHECK AND/OR REMOVAL  6/16/2021    IR NEPHROSTOMY TUBE CHECK/CHANGE/REPOSITION/REINSERTION/UPSIZE  5/14/2021    IR NEPHROSTOMY TUBE CHECK/CHANGE/REPOSITION/REINSERTION/UPSIZE  5/21/2021    IR NEPHROSTOMY TUBE PLACEMENT  5/10/2021    IR NON-TUNNELED CENTRAL LINE PLACEMENT  7/17/2020    IR NON-TUNNELED CENTRAL LINE PLACEMENT  8/14/2020    IR NON-TUNNELED CENTRAL LINE PLACEMENT  7/16/2021    LAPAROTOMY N/A 8/9/2020    Procedure: LAPAROTOMY EXPLORATORY, PARASTOMAL HERNIA REPAIR WITH MESH;  Surgeon: Samantha Nascimento DO;  [facility];  Service: General    IN ANASTOMOSIS,AV,ANY SITE Right 1/5/2021    Procedure: Creation of right brachiobasilic fistula;   Surgeon: Angelique Christianson MD;  [facility]; Service: Vascular    TN COLONOSCOPY FLX DX W/COLLJ SPEC WHEN PFRMD N/A 8/31/2016    Procedure: COLONOSCOPY;  Surgeon: Aimee Rendon MD;  Location: [facility]; Service: Gastroenterology    TN CYSTOSCOPY,INSERT URETERAL STENT Bilateral 7/27/2016    Procedure: STENT INSERTION; EXCISION OF MESH ;  Surgeon: Candance Netter, MD;  [facility];  Service: Urology    TONSILLECTOMY      TUBAL LIGATION      WISDOM TOOTH EXTRACTION       Family History   Problem Relation Age of Onset    Cancer Mother         small cell cancer     Heart disease Father     COPD Father     Heart disease Brother     Nephrolithiasis Brother       reports that she has never smoked  She has never used smokeless tobacco  She reports previous alcohol use  She reports previous drug use  Physical Exam:   Vitals:    09/15/21 1000   BP: 128/78   BP Location: Left arm   Patient Position: Sitting   Cuff Size: Standard   Pulse: 70   Weight: 82 1 kg (181 lb)   Height: 5' (1 524 m)     Body mass index is 35 35 kg/m²      General: conscious, cooperative, in not acute distress  Eyes: conjunctivae pink, anicteric sclerae  ENT: lips and mucous membranes moist  Neck: supple, no JVD  Chest: clear breath sounds bilateral, no crackles, ronchus or wheezings  CVS: distinct S1 & S2, normal rate, regular rhythm  Abdomen: obese, non-tender, non-distended, normoactive bowel sounds  Back: no CVA tenderness  Extremities: no significant edema of both legs, right arm swelling with AV fistula with positive thrill and bruit  Skin: no rash  Neuro: awake, alert, oriented            Laboratory Results:  Lab Results   Component Value Date    WBC 5 78 09/10/2021    HGB 8 1 (L) 09/10/2021    HCT 26 5 (L) 09/10/2021    MCV 98 09/10/2021     09/10/2021     Lab Results   Component Value Date    SODIUM 137 09/10/2021    K 4 2 09/10/2021     (H) 09/10/2021    CO2 22 09/10/2021    BUN 38 (H) 09/10/2021    CREATININE 3 42 (H) 09/10/2021    GLUC 72 08/11/2021 CALCIUM 8 5 09/10/2021       Lab Results   Component Value Date     4 (H) 09/10/2021    CALCIUM 8 5 09/10/2021    PHOS 3 9 09/10/2021             Portions of the record may have been created with voice recognition software  Occasional wrong word or "sound a like" substitutions may have occurred due to the inherent limitations of voice recognition software  Read the chart carefully and recognize, using context, where substitutions have occurred  If you have any questions, please contact the dictating provider

## 2021-09-15 NOTE — H&P (VIEW-ONLY)
OFFICE FOLLOW UP - Nephrology   Eliu Khanna 77 y o  female MRN: [id number]       ASSESSMENT and PLAN:  Aisha Zacarias was seen today for follow-up and chronic kidney disease  Diagnoses and all orders for this visit:    Stage 5 chronic kidney disease not on chronic dialysis ([facility] [address] )  -     CBC; Future  -     Renal function panel; Future  -     PTH, intact; Future    Hydronephrosis of left kidney    Obstructive left pyelonephritis    Obstructive uropathy    Polycythemia vera (HCC)    Obesity, morbid (HCC)    Class 2 severe obesity due to excess calories with serious comorbidity and body mass index (BMI) of 35 0 to 35 9 in adult [facility])    Anemia in stage 5 chronic kidney disease, not on chronic dialysis [facility])    Essential hypertension    Secondary hyperparathyroidism of renal origin [facility])        This is a 71-year-old lady with history of chronic kidney disease stage 5 with creatinine lately in the low to mid 3s, history of urinary incontinence, bilateral hydronephrosis secondary to obstructive uropathy and 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 in October 2016,  hospitalized 07-08/2021 with COVID-19 infection who returns to the office for hospital follow-up  1  Chronic kidney disease stage 5 not on dialysis  In the setting of obstructive uropathy, functional solitary right kidney, episode of acute kidney injury  Frtunately her renal function remains stable in the mid threes, patient without significant uremic symptoms  Status post AV fistula creation on 01/05/2021 by Dr Chairez, will contact vascular surgery for follow-up  I would like to see her back in the office in 10 weeks with repeat blood and urine test   Once again discussed with patient about signs or symptoms to look for to contact us or to go to the emergency department    2  Hypertension, blood pressure acceptable, follow low-salt diet, no changes on medication at this moment       3  History of nonfunctional bladder causing bilateral hydronephrosis status in 2 months with repeat labs post supra trigonal cystectomy and ileal conduit 10/2016, continue follow-up with urologist     4  Polycythemia area, previous history of PE, on Eliquis, continue follow-up with Hematology follow-up, Dr Cleo Abbott  Anemia of chronic disease  Most recent hemoglobin low at 8 1,  JENELLE as per Hematology-Oncology    5  Mineral bone disease, PTH acceptable for her degree of kidney dysfunction, continue with Calcitrol 1 tablet 3 times a week (Monday, Wednesday, Friday), follow labs in 10 weeks    6  Hyperlipidemia, on statins by Cardiology    7  Mietabolic acidosis, stable, continue with sodium bicarbonate  8  History of PE, on Eliquis    9  Access, right brachiocephalic AV fistula created on 01/05/2021 by Dr Sumaya Mcmanus Doctor  Status post fistulogram with high-grade stenosis in the subclavian vein status post angioplasty on 02/10/2021  Continue follow-up with vascular surgery  Patient Instructions   As we discussed in the office visit, you have advanced chronic kidney disease  Based on your most recent blood test your kidney function remains stable  There is no urgent indication to start dialysis at this moment  I would like to see you back in the office in 8-10 weeks with repeat blood test  Continue with close follow-up with primary care doctor, hematologist, vascular surgeon  Remember to follow low-salt diet  No changes in medication at this moment  You develop any decreased appetite, nausea, vomiting, taste changes, please contact me or go to the emergency department          HPI: Eliu Khanna is a 77 y o  female who is here for Follow-up and Chronic Kidney Disease    Hospitalized in 10/2018 due to a small bowel obstruction that resolved spontaneously  Hospitalized early 01/2019 status post fall, found to have a left-sided subdural hematoma, creatinine on admission 3 0 that decreased to 2 5 after intravenously fluids    Hospitalized 2019 after status post fall complicated with T-spine compression fracture, found to have sepsis secondary to UTI, also developed acute kidney injury on top of CKD, creatinine on admission was 3 68, renal function improved and creatinine went down to 2 59 on discharge day  Hospitalized 2020 with severe acute kidney injury in the setting of incarcerated ileal conduit parastomal hernia status post ex lap and parastomal hernia repair  Renal function improved with creatinine down to the low 3s  Status post right brachiocephalic AV fistula creation on 2021  Hospitalized between -2021 in [facility] due to gross hematuria, found to have new left hydronephrosis s/p left PCN  Last time seen was 2021 by our physician assistant, today returns for hospital follow up  Unfortunately since last office visit, patient was hospitalized at [facility] between 7/15 to 8/3/2021 with fever and shortness of breath, found to have COVID-19 pneumonia, renal function remained stable realization  Patient is unvaccinated  Unfortunately patient's son  due to COVID-19 infection    Today patient returns to the office for hospital follow-up, feeling tired specially with minimal activity  Denies any chest pain, no shortness of breath at rest, she has some lower extremity edema  Denies any abdominal pain, no nausea, no vomiting, or diarrhea  Notices ileal conduit urine output stable and clear  Denies any NSAID use  Most recent blood test on 09/10/2021 reviewed, renal function is stable with a creatinine at 3 42, hemoglobin 8 1, bicarb 22      ROS: All the systems were reviewed and were negative except as documented on the H&P            Allergies: Chlorhexidine    Medications:   Current Outpatient Medications:     acetaminophen (TYLENOL) 325 mg tablet, 650 mg every 6 hours as needed for mild pain or headache, Disp: 30 tablet, Rfl: 0    apixaban (Eliquis) 2 5 mg, Take 1 tablet (2 5 mg total) by mouth 2 (two) times a day, Disp: 28 tablet, Rfl: 0    atorvastatin (LIPITOR) 40 mg tablet, Take 1 tablet (40 mg total) by mouth daily at bedtime, Disp: 14 tablet, Rfl: 0    calcitriol (ROCALTROL) 0 25 mcg capsule, Take 1 capsule (0 25 mcg total) by mouth daily, Disp: 14 capsule, Rfl: 0    Cholecalciferol (VITAMIN D3) 1000 UNITS CAPS, Take 1,000 unit marking on U-100 syringe by mouth daily  , Disp: , Rfl:     citalopram (CeleXA) 20 mg tablet, Take 1 tablet (20 mg total) by mouth daily, Disp: 14 tablet, Rfl: 0    docusate sodium (COLACE) 100 mg capsule, Take 1 capsule (100 mg total) by mouth 2 (two) times a day, Disp: 10 capsule, Rfl: 0    Jakafi 10 MG tablet, TAKE 1 TABLET (10 MG TOTAL) BY MOUTH DAILY, Disp: 30 tablet, Rfl: 3    levothyroxine 75 mcg tablet, Take 1 tablet (75 mcg total) by mouth daily, Disp: 14 tablet, Rfl: 0    loperamide (IMODIUM) 2 mg capsule, Take 1 capsule (2 mg total) by mouth 4 (four) times a day as needed for diarrhea, Disp: 12 capsule, Rfl: 0    melatonin 3 mg, Take 1 tablet (3 mg total) by mouth daily at bedtime, Disp: 30 tablet, Rfl: 0    metoprolol tartrate (LOPRESSOR) 25 mg tablet, Take 1 tablet (25 mg total) by mouth 2 (two) times a day, Disp: 28 tablet, Rfl: 0    polyethylene glycol (MIRALAX) 17 g packet, Take 17 g by mouth daily as needed (constipation), Disp: 10 each, Rfl: 0    saccharomyces boulardii (Florastor) 250 mg capsule, Take 1 capsule (250 mg total) by mouth daily, Disp: 14 capsule, Rfl: 0    sodium bicarbonate 650 mg tablet, Take 1 tablet (650 mg total) by mouth 2 (two) times daily after meals, Disp: 28 tablet, Rfl: 0    Past Medical History:   Diagnosis Date    Anxiety     Bowel obstruction (HCC)     Chronic kidney disease (CKD), stage IV (severe) (HCC)     stage IV    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    Circulation problem     Compression fracture of cervical spine (HCC)     Hernia of abdominal cavity     History of kidney problems     Hydronephrosis     Hypertension     IBS (irritable bowel syndrome)     Incontinence     Lung mass     Improving on PET/CT 1/2016    Polycythemia vera ([facility] [address] )     Pulmonary embolism ([facility] [address] ) 2014    Shingles     Urinary tract infection      Past Surgical History:   Procedure Laterality Date    ABDOMINAL ADHESION SURGERY N/A 8/9/2020    Procedure: LYSIS ADHESIONS;  Surgeon: Vonda Bell DO;  [facility];  Service: General    BLADDER SUSPENSION      BOTOX INJECTION N/A 7/27/2016    Procedure: BOTOX INJECTION ;  Surgeon: Conrad Chen MD;  [facility];  Service:     CHOLECYSTECTOMY N/A     COLONOSCOPY      CYSTECTOMY, RADICAL WITH ILEOCONDUIT N/A 10/4/2016    Procedure: Dany Jeromy WITH ILEAL CONDUIT ;  Surgeon: Conrad Chen MD;  [facility];  Service:    Danita Quiver W/ RETROGRADES Bilateral 7/27/2016    Procedure: CYSTOSCOPY; RETROGRADE PYELOGRAM ;  Surgeon: Conrad Chen MD;  [facility];  Service:     HERNIA REPAIR      IR AV FISTULAGRAM/GRAFTOGRAM  2/10/2021    IR NEPHROSTOMY TO NEPHROURETERAL STENT  5/15/2021    IR NEPHROSTOMY TO NEPHROURETERAL STENT  6/9/2021    IR NEPHROSTOMY TUBE CHECK AND/OR REMOVAL  6/16/2021    IR NEPHROSTOMY TUBE CHECK/CHANGE/REPOSITION/REINSERTION/UPSIZE  5/14/2021    IR NEPHROSTOMY TUBE CHECK/CHANGE/REPOSITION/REINSERTION/UPSIZE  5/21/2021    IR NEPHROSTOMY TUBE PLACEMENT  5/10/2021    IR NON-TUNNELED CENTRAL LINE PLACEMENT  7/17/2020    IR NON-TUNNELED CENTRAL LINE PLACEMENT  8/14/2020    IR NON-TUNNELED CENTRAL LINE PLACEMENT  7/16/2021    LAPAROTOMY N/A 8/9/2020    Procedure: LAPAROTOMY EXPLORATORY, PARASTOMAL HERNIA REPAIR WITH MESH;  Surgeon: Vonda Bell DO;  [facility];  Service: General    ND ANASTOMOSIS,AV,ANY SITE Right 1/5/2021    Procedure: Creation of right brachiobasilic fistula;   Surgeon: Brenda Fong MD;  [facility]; Service: Vascular    NJ COLONOSCOPY FLX DX W/COLLJ SPEC WHEN PFRMD N/A 8/31/2016    Procedure: COLONOSCOPY;  Surgeon: Ofe Willis MD;  [facility]; Service: Gastroenterology    NJ CYSTOSCOPY,INSERT URETERAL STENT Bilateral 7/27/2016    Procedure: STENT INSERTION; EXCISION OF MESH ;  Surgeon: Pamela Davenport MD;  [facility];  Service: Urology    TONSILLECTOMY      TUBAL LIGATION      WISDOM TOOTH EXTRACTION       Family History   Problem Relation Age of Onset    Cancer Mother         small cell cancer     Heart disease Father     COPD Father     Heart disease Brother     Nephrolithiasis Brother       reports that she has never smoked  She has never used smokeless tobacco  She reports previous alcohol use  She reports previous drug use  Physical Exam:   Vitals:    09/15/21 1000   BP: 128/78   BP Location: Left arm   Patient Position: Sitting   Cuff Size: Standard   Pulse: 70   Weight: 82 1 kg (181 lb)   Height: 5' (1 524 m)     Body mass index is 35 35 kg/m²      General: conscious, cooperative, in not acute distress  Eyes: conjunctivae pink, anicteric sclerae  ENT: lips and mucous membranes moist  Neck: supple, no JVD  Chest: clear breath sounds bilateral, no crackles, ronchus or wheezings  CVS: distinct S1 & S2, normal rate, regular rhythm  Abdomen: obese, non-tender, non-distended, normoactive bowel sounds  Back: no CVA tenderness  Extremities: no significant edema of both legs, right arm swelling with AV fistula with positive thrill and bruit  Skin: no rash  Neuro: awake, alert, oriented            Laboratory Results:  Lab Results   Component Value Date    WBC 5 78 09/10/2021    HGB 8 1 (L) 09/10/2021    HCT 26 5 (L) 09/10/2021    MCV 98 09/10/2021     09/10/2021     Lab Results   Component Value Date    SODIUM 137 09/10/2021    K 4 2 09/10/2021     (H) 09/10/2021    CO2 22 09/10/2021    BUN 38 (H) 09/10/2021    CREATININE 3 42 (H) 09/10/2021    GLUC 72 08/11/2021 CALCIUM 8 5 09/10/2021       Lab Results   Component Value Date     4 (H) 09/10/2021    CALCIUM 8 5 09/10/2021    PHOS 3 9 09/10/2021             Portions of the record may have been created with voice recognition software  Occasional wrong word or "sound a like" substitutions may have occurred due to the inherent limitations of voice recognition software  Read the chart carefully and recognize, using context, where substitutions have occurred  If you have any questions, please contact the dictating provider

## 2021-09-15 NOTE — PATIENT INSTRUCTIONS
As we discussed in the office visit, you have advanced chronic kidney disease  Based on your most recent blood test your kidney function remains stable  There is no urgent indication to start dialysis at this moment  I would like to see you back in the office in 8-10 weeks with repeat blood test  Continue with close follow-up with primary care doctor, hematologist, vascular surgeon  Remember to follow low-salt diet  No changes in medication at this moment    You develop any decreased appetite, nausea, vomiting, taste changes, please contact me or go to the emergency department

## 2021-09-16 ENCOUNTER — HOSPITAL ENCOUNTER (OUTPATIENT)
Dept: RADIOLOGY | Facility: HOSPITAL | Age: 76
Discharge: HOME | End: 2021-09-16
Attending: RADIOLOGY
Payer: COMMERCIAL

## 2021-09-16 DIAGNOSIS — N13.30 HYDRONEPHROSIS OF LEFT KIDNEY: Primary | ICD-10-CM

## 2021-09-16 DIAGNOSIS — N13.9 OBSTRUCTIVE UROPATHY: ICD-10-CM

## 2021-09-16 PROCEDURE — 50387 CHANGE NEPHROURETERAL CATH: CPT

## 2021-09-16 PROCEDURE — C1769 GUIDE WIRE: HCPCS

## 2021-09-16 PROCEDURE — C1729 CATH, DRAINAGE: HCPCS

## 2021-09-16 PROCEDURE — 50688 CHANGE OF URETER TUBE/STENT: CPT | Performed by: RADIOLOGY

## 2021-09-16 PROCEDURE — 75984 XRAY CONTROL CATHETER CHANGE: CPT | Performed by: RADIOLOGY

## 2021-09-16 RX ORDER — CIPROFLOXACIN 500 MG/5ML
KIT ORAL CODE/TRAUMA/SEDATION MEDICATION
Status: COMPLETED | OUTPATIENT
Start: 2021-09-16 | End: 2021-09-16

## 2021-09-16 RX ADMIN — CIPROFLOXACIN 500 MG: KIT at 09:01

## 2021-09-16 RX ADMIN — IOHEXOL 10 ML: 350 INJECTION, SOLUTION INTRAVENOUS at 10:06

## 2021-09-16 NOTE — BRIEF OP NOTE (RAD/CATH)
INTERVENTIONAL RADIOLOGY PROCEDURE NOTE    Date: 9/16/2021    Procedure: IR NEPHROSTOMY TUBE CHECK/CHANGE/REPOSITION/REINSERTION/UPSIZE    Preoperative diagnosis:   1  Obstructive uropathy         Postoperative diagnosis: Same  Surgeon: Xiomara Haas MD     Assistant: None  No qualified resident was available  Blood loss: None    Specimens: None     Findings: 10 F left retrograde PCNU exchange  Complications: None immediate      Anesthesia: none

## 2021-09-16 NOTE — INTERVAL H&P NOTE
Update: (This section must be completed if the H&P was completed greater than 24 hrs to procedure or admission)    H&P reviewed  After examining the patient, I find no changed to the H&P since it had been written  Patient re-evaluated   Accept as history and physical     Karen Rios MD/September 16, 2021/10:11 AM

## 2021-09-17 ENCOUNTER — PREP FOR PROCEDURE (OUTPATIENT)
Dept: INTERVENTIONAL RADIOLOGY/VASCULAR | Facility: CLINIC | Age: 76
End: 2021-09-17

## 2021-09-17 ENCOUNTER — TELEPHONE (OUTPATIENT)
Dept: RADIOLOGY | Facility: HOSPITAL | Age: 76
End: 2021-09-17

## 2021-09-17 DIAGNOSIS — N13.9 OBSTRUCTIVE UROPATHY: Primary | ICD-10-CM

## 2021-09-17 NOTE — PRE-PROCEDURE INSTRUCTIONS
For Retrograde PCNU Replacement on Monday 9/20/21  at [facility]  Pre-instructions given to [place]: NPO after midnight, Need  home, Arrival time and location will be as per short stay  Procedure is scheduled for 12 noon in IR  Pre-Covid screening completed  Verbalizes understanding of pre-instructions

## 2021-09-20 ENCOUNTER — ANESTHESIA (OUTPATIENT)
Dept: RADIOLOGY | Facility: HOSPITAL | Age: 76
End: 2021-09-20

## 2021-09-20 ENCOUNTER — HOSPITAL ENCOUNTER (OUTPATIENT)
Dept: RADIOLOGY | Facility: HOSPITAL | Age: 76
Discharge: HOME | End: 2021-09-20
Payer: COMMERCIAL

## 2021-09-20 ENCOUNTER — ANESTHESIA EVENT (OUTPATIENT)
Dept: RADIOLOGY | Facility: HOSPITAL | Age: 76
End: 2021-09-20

## 2021-09-20 VITALS
RESPIRATION RATE: 20 BRPM | SYSTOLIC BLOOD PRESSURE: 171 MMHG | OXYGEN SATURATION: 97 % | TEMPERATURE: 96.7 F | DIASTOLIC BLOOD PRESSURE: 82 MMHG | HEART RATE: 82 BPM

## 2021-09-20 DIAGNOSIS — N13.9 OBSTRUCTIVE UROPATHY: ICD-10-CM

## 2021-09-20 DIAGNOSIS — N13.30 HYDRONEPHROSIS OF LEFT KIDNEY: Primary | ICD-10-CM

## 2021-09-20 PROCEDURE — 50688 CHANGE OF URETER TUBE/STENT: CPT

## 2021-09-20 PROCEDURE — C1769 GUIDE WIRE: HCPCS

## 2021-09-20 PROCEDURE — C1887 CATHETER, GUIDING: HCPCS

## 2021-09-20 PROCEDURE — C1894 INTRO/SHEATH, NON-LASER: HCPCS

## 2021-09-20 PROCEDURE — C1729 CATH, DRAINAGE: HCPCS

## 2021-09-20 PROCEDURE — 50688 CHANGE OF URETER TUBE/STENT: CPT | Performed by: INTERNAL MEDICINE

## 2021-09-20 PROCEDURE — 75984 XRAY CONTROL CATHETER CHANGE: CPT | Performed by: INTERNAL MEDICINE

## 2021-09-20 PROCEDURE — 50432 PLMT NEPHROSTOMY CATHETER: CPT

## 2021-09-20 RX ORDER — PROPOFOL 10 MG/ML
INJECTION, EMULSION INTRAVENOUS AS NEEDED
Status: DISCONTINUED | OUTPATIENT
Start: 2021-09-20 | End: 2021-09-20

## 2021-09-20 RX ORDER — LIDOCAINE HYDROCHLORIDE 10 MG/ML
0.5 INJECTION, SOLUTION EPIDURAL; INFILTRATION; INTRACAUDAL; PERINEURAL ONCE AS NEEDED
Status: DISCONTINUED | OUTPATIENT
Start: 2021-09-20 | End: 2021-09-21 | Stop reason: HOSPADM

## 2021-09-20 RX ORDER — ONDANSETRON 2 MG/ML
INJECTION INTRAMUSCULAR; INTRAVENOUS AS NEEDED
Status: DISCONTINUED | OUTPATIENT
Start: 2021-09-20 | End: 2021-09-20

## 2021-09-20 RX ORDER — SODIUM CHLORIDE 9 MG/ML
50 INJECTION, SOLUTION INTRAVENOUS CONTINUOUS
Status: DISCONTINUED | OUTPATIENT
Start: 2021-09-20 | End: 2021-09-21 | Stop reason: HOSPADM

## 2021-09-20 RX ORDER — FENTANYL CITRATE 50 UG/ML
INJECTION, SOLUTION INTRAMUSCULAR; INTRAVENOUS AS NEEDED
Status: DISCONTINUED | OUTPATIENT
Start: 2021-09-20 | End: 2021-09-20

## 2021-09-20 RX ORDER — CEFAZOLIN SODIUM 2 G/50ML
2000 SOLUTION INTRAVENOUS ONCE
Status: COMPLETED | OUTPATIENT
Start: 2021-09-20 | End: 2021-09-20

## 2021-09-20 RX ORDER — EPHEDRINE SULFATE 50 MG/ML
INJECTION INTRAVENOUS AS NEEDED
Status: DISCONTINUED | OUTPATIENT
Start: 2021-09-20 | End: 2021-09-20

## 2021-09-20 RX ORDER — OXYCODONE HYDROCHLORIDE 5 MG/1
5 TABLET ORAL EVERY 4 HOURS PRN
Status: COMPLETED | OUTPATIENT
Start: 2021-09-20 | End: 2021-09-20

## 2021-09-20 RX ORDER — MIDAZOLAM HYDROCHLORIDE 2 MG/2ML
INJECTION, SOLUTION INTRAMUSCULAR; INTRAVENOUS AS NEEDED
Status: DISCONTINUED | OUTPATIENT
Start: 2021-09-20 | End: 2021-09-20

## 2021-09-20 RX ORDER — FENTANYL CITRATE/PF 50 MCG/ML
25 SYRINGE (ML) INJECTION
Status: DISCONTINUED | OUTPATIENT
Start: 2021-09-20 | End: 2021-09-20 | Stop reason: HOSPADM

## 2021-09-20 RX ORDER — SODIUM CHLORIDE 9 MG/ML
INJECTION, SOLUTION INTRAVENOUS CONTINUOUS PRN
Status: DISCONTINUED | OUTPATIENT
Start: 2021-09-20 | End: 2021-09-20

## 2021-09-20 RX ORDER — SUCCINYLCHOLINE/SOD CL,ISO/PF 100 MG/5ML
SYRINGE (ML) INTRAVENOUS AS NEEDED
Status: DISCONTINUED | OUTPATIENT
Start: 2021-09-20 | End: 2021-09-20

## 2021-09-20 RX ORDER — DEXAMETHASONE SODIUM PHOSPHATE 10 MG/ML
INJECTION, SOLUTION INTRAMUSCULAR; INTRAVENOUS AS NEEDED
Status: DISCONTINUED | OUTPATIENT
Start: 2021-09-20 | End: 2021-09-20

## 2021-09-20 RX ORDER — ONDANSETRON 2 MG/ML
4 INJECTION INTRAMUSCULAR; INTRAVENOUS ONCE AS NEEDED
Status: DISCONTINUED | OUTPATIENT
Start: 2021-09-20 | End: 2021-09-20 | Stop reason: HOSPADM

## 2021-09-20 RX ADMIN — OXYCODONE HYDROCHLORIDE 5 MG: 5 TABLET ORAL at 17:11

## 2021-09-20 RX ADMIN — FENTANYL CITRATE 100 MCG: 50 INJECTION INTRAMUSCULAR; INTRAVENOUS at 12:46

## 2021-09-20 RX ADMIN — MIDAZOLAM 2 MG: 1 INJECTION INTRAMUSCULAR; INTRAVENOUS at 12:46

## 2021-09-20 RX ADMIN — EPHEDRINE SULFATE 10 MG: 50 INJECTION, SOLUTION INTRAVENOUS at 13:20

## 2021-09-20 RX ADMIN — IOHEXOL 40 ML: 350 INJECTION, SOLUTION INTRAVENOUS at 14:55

## 2021-09-20 RX ADMIN — PROPOFOL 200 MG: 10 INJECTION, EMULSION INTRAVENOUS at 12:50

## 2021-09-20 RX ADMIN — CEFAZOLIN SODIUM 2000 MG: 2 SOLUTION INTRAVENOUS at 13:16

## 2021-09-20 RX ADMIN — EPHEDRINE SULFATE 15 MG: 50 INJECTION, SOLUTION INTRAVENOUS at 12:56

## 2021-09-20 RX ADMIN — SODIUM CHLORIDE: 0.9 INJECTION, SOLUTION INTRAVENOUS at 12:46

## 2021-09-20 RX ADMIN — DEXAMETHASONE SODIUM PHOSPHATE 10 MG: 10 INJECTION, SOLUTION INTRAMUSCULAR; INTRAVENOUS at 15:10

## 2021-09-20 RX ADMIN — Medication 25 MCG: at 15:38

## 2021-09-20 RX ADMIN — ONDANSETRON 4 MG: 2 INJECTION INTRAMUSCULAR; INTRAVENOUS at 15:08

## 2021-09-20 RX ADMIN — Medication 25 MCG: at 15:59

## 2021-09-20 RX ADMIN — Medication 100 MG: at 12:50

## 2021-09-20 NOTE — SEDATION DOCUMENTATION
Left anterior PCNU placement by Dr Sandra Choi, pt sedation managed by anesthesia  Pt to PACU report to be given at bedside

## 2021-09-20 NOTE — PROGRESS NOTES
Received pt from PACU at 1630  Called IR and requested that someone from IR come and assist with clarifying orders and care pt would need for dc  Staff scrubbed in and to come a when done  Called Roberta back in IR at approx [phone] and requested Dr Praveen Vasquez clarify flushes if any and to which tube  Roger Carlton saw pt and clarified orders  New discharge instructions provided to pt  I will review them with her

## 2021-09-20 NOTE — INTERVAL H&P NOTE
Update: (This section must be completed if the H&P was completed greater than 24 hrs to procedure or admission)    H&P reviewed  After examining the patient, I find no changed to the H&P since it had been written  77year old female presenting with a left retrograde PCNU that has become completely dislodged  Plan for fresh-stick left nephrostomy access, and retrograde PCNU replacement  A left PCN may be placed if needed  Sedation/anesthesia to be provided by anesthesia team      Procedure, risks, benefits explained to patient  Consent obtained at bedside  Patient re-evaluated   Accept as history and physical     Es Monsalve MD/September 20, 2021/12:12 PM

## 2021-09-20 NOTE — BRIEF OP NOTE (RAD/CATH)
"[facility]  Progress Note  Established Patient    Subjective:   David Houston is a 56 y.o. male here today with a chief complaint of wrist pain. The patient is alone.     Essential hypertension  The patient's blood pressure is under excellent control on his current medication regimen. He denies any new concerns of lightheadedness.    Hyperbilirubinemia  Noted on past labs. The patient will be getting follow-up labs soon.    Left wrist pain  The patient describes a 6 week history of left wrist pain and swelling. There is no associated trauma. His symptoms are mild to moderate in severity. Flexion exacerbates his symptoms. Splinting at night seems to help.       Current Outpatient Prescriptions on File Prior to Visit   Medication Sig Dispense Refill   • atorvastatin (LIPITOR) 40 MG TABS Take  by mouth. daily     • aspirin 81 MG tablet Take  by mouth. daily     • fluticasone (FLONASE) 50 MCG/ACT nasal spray Spray 1-2 Sprays in nose every day. 3 Bottle 12     No current facility-administered medications on file prior to visit.        Past Medical History:   Diagnosis Date   • Fall    • Hypercholesterolemia    • Hypertension        Allergies: Nkda [no known drug allergy]    Surgical History:  has a past surgical history that includes ankle orif (6/28/2010); other abdominal surgery (1970 ); and hardware removal ortho (1/26/2011).    Family History: family history includes Cancer in his father; Dementia in his father; No Known Problems in his mother.    Social History:  reports that he quit smoking about 24 years ago. He has never used smokeless tobacco. He reports that he does not drink alcohol or use drugs.    ROS:  No fever or nausea.        Objective:     Vitals:    10/18/18 1034   BP: 110/68   BP Location: Left arm   Patient Position: Sitting   BP Cuff Size: Adult   Pulse: 60   Temp: 36.4 °C (97.6 °F)   TempSrc: Temporal   SpO2: 97%   Weight: 94 kg (207 lb 3.2 oz)   Height: 1.803 m (5' 11\") " INTERVENTIONAL RADIOLOGY PROCEDURE NOTE    Date: 9/20/2021    Procedure: IR NEPHROSTOMY TUBE PLACEMENT    Preoperative diagnosis:   1  Obstructive uropathy         Postoperative diagnosis: Same  Surgeon: Nigel Bryan MD     Assistant: None  No qualified resident was available  Blood loss: Minimal, serosanguinous output from PCN    Specimens: None     Findings:    Completely dislodged left retrograde PCNU  10F x 40cm retrograde PCNU replacement using a new left percutaneous nephrostomy access    A left 8F PCN was placed  Routine exchange  of retrograde PCNU in 8-12 weeks  Left PCN tube check, and possible removal in 2 weeks  The new left PCN may be capped  Complications: None immediate      Anesthesia: general       Physical Exam:  General: alert in no apparent distress.   Wrist: Left wrist is swollen but there is no redness or warmth. There is some reported pain on flexion of the left wrist. Phalen's and Tinel sign are normal.      Assessment and Plan:     1. Need for vaccination  - Influenza Vaccine Quad Injection >3Y (PF)    2. Essential hypertension  This is an established and stable problem  - lisinopril-hydrochlorothiazide (PRINZIDE, ZESTORETIC) 10-12.5 MG per tablet; Take 1 Tab by mouth every day.  Dispense: 90 Tab; Refill: 4    3. Hyperbilirubinemia  Suspect Gilbert's.  - reminded to get labs.     4. Left wrist pain  Not c/w infection. Suspect OA. Less likely would be an inflammatory arthropathy. Will proceed with following w/u. Patient states symptoms are, overall, improving.   - DX-WRIST-COMPLETE 3+ LEFT; Future  - WESTERGREN SED RATE; Future  - CRP QUANTITIVE (NON-CARDIAC); Future  - URIC ACID; Future  - naproxen (NAPROSYN) 375 MG Tab; Take 1 Tab by mouth 2 times a day, with meals for 7 days.  Dispense: 14 Tab; Refill: 0  - thumb spica at night.        Followup: Return if symptoms worsen or fail to improve.

## 2021-09-20 NOTE — ANESTHESIA PREPROCEDURE EVALUATION
Procedure:  IR NEPHROSTOMY TUBE PLACEMENT    Relevant Problems   CARDIO   (+) Chronic thrombosis of subclavian vein (HCC)   (+) Hyperlipemia   (+) Hypertension      ENDO   (+) Secondary hyperparathyroidism of renal origin ([facility] [address] )      GI/HEPATIC   (+) Small bowel obstruction (HCC)      /RENAL   (+) Acute renal failure superimposed on stage 5 chronic kidney disease, not on chronic dialysis (HCC)   (+) Hydronephrosis of left kidney   (+) Obstructive left pyelonephritis   (+) Stage 5 chronic kidney disease not on chronic dialysis (HCC)      HEMATOLOGY   (+) Anemia   (+) Anemia in CKD (chronic kidney disease)      NEURO/PSYCH   (+) Depression   (+) History of Clostridioides difficile colitis   (+) History of shingles   (+) Intraventricular hemorrhage (HCC)      Other   (+) Polycythemia vera (HCC)        Physical Exam    Airway    Mallampati score: III  TM Distance: >3 FB  Neck ROM: full     Dental       Cardiovascular      Pulmonary      Other Findings        Anesthesia Plan  ASA Score- 3     Anesthesia Type-         Additional Monitors:   Airway Plan: ETT  Plan Factors-        Patient is not a current smoker  Patient did not smoke on day of surgery  Induction- intravenous and rapid sequence induction  Postoperative Plan- Plan for postoperative opioid use  Planned trial extubation    Informed Consent- Anesthetic plan and risks discussed with patient  I personally reviewed this patient with the CRNA  Discussed and agreed on the Anesthesia Plan with the MUKESH Oleary

## 2021-09-20 NOTE — ANESTHESIA POSTPROCEDURE EVALUATION
Post-Op Assessment Note    CV Status:  Stable  Pain Score: 0    Pain management: adequate     Mental Status:  Alert   Hydration Status:  Euvolemic   PONV Controlled:  Controlled   Airway Patency:  Patent      Post Op Vitals Reviewed: Yes      Staff: CRNA         No complications documented      BP   134/75   Temp   97 4F   Pulse  78   Resp   17   SpO2   100%

## 2021-09-20 NOTE — DISCHARGE INSTRUCTIONS
You will be contacted by Interventional Radiology to schedule 2 week appointment to have left PCN (left kidney tube) removed  You do not need to flush either tube  If you have any questions, please call IR at [phone]  Nephrostomy Tube Care   WHAT YOU NEED TO KNOW:   A nephrostomy tube is a catheter (thin plastic tube) that is inserted through your skin and into your kidney  The nephrostomy tube drains urine from your kidney into a collecting bag outside your body  You may need a nephrostomy tube when something is blocking the normal flow of urine  A nephrostomy tube may be used for a short or a long period of time  The nephrostomy tube comes out of your back, so you will need someone to help care for your nephrostomy tube  DISCHARGE INSTRUCTIONS:   Medicines:   · Antibiotics  may be given to prevent or treat an infection caused by bacteria  You may need to take antibiotics for 5 to 10 days after the tube is placed  · Take your medicine as directed  Contact your healthcare provider if you think your medicine is not helping or you have side effects  Tell him if you are allergic to any medicine  Keep a list of the medicines, vitamins, and herbs you take  Include the amounts, and when and why you take them  Bring the list or the pill bottles to follow-up visits  Carry your medicine list with you in case of an emergency  Follow up with your healthcare provider as directed: You may need a test called a nephrostogram to make sure your nephrostomy tube is in the correct place  Write down your questions so you remember to ask them during your visits  How to clean the skin around the nephrostomy tube and change the bandage:  Since the nephrostomy tube comes out of your back, you will not be able to care for it by yourself  Ask someone to follow the general directions below to check and care for your nephrostomy tube   Ask your healthcare provider how your skin should be cleaned and what skin barriers and attachment devices to use  · Gather the items you will need  ? Disposable (single use) under-pad, and a clean washcloth    ? Plain soap, warm water, and new medical gloves    ? Sterile gauze bandages    ? Clear adhesive dressing or medical tape    ? Skin barrier    ? Tube attachment device     ? Protective skin film    ? Hydrogen peroxide and saline solution (if ordered by a healthcare provider)    ? Medicine for your skin (if ordered by a healthcare provider)     ? Trash bag    · Remove the old bandage, and check the tube entry site  ? Have the patient lie on his side with the nephrostomy tube entry site facing up  Place the under-pad where it will catch drainage as you are working with the nephrostomy tube  ? Wash your hands with soap and water  Put on new medical gloves  ? Gently remove the old bandage and skin barrier  Do this by holding the skin beside the tube with one hand  With the other hand, gently remove sticky tape and the skin barrier by pulling in the same direction as hair growth  Do not touch the side of the bandage that is placed over or around the tube  Throw the bandage and skin barrier away in a trash bag     ? Look for signs of infection, such as skin redness and swelling  Report any skin changes to healthcare providers  ? There may be a black amado on the tube to amado the place where the tube enters the skin  Check to see that the black amado is next to the skin  If it is further down the tube, the tube has moved  A healthcare provider needs to put it back in  · Clean the tube entry site  ? Hold the tube in place to keep it from being pulled out while you are cleaning around it  ? You will need to clean the area twice  For the first cleaning, wet a new gauze bandage with soap and water  You may be directed to use hydrogen peroxide instead  Begin at the entry site of the tube  Wipe the skin in circles, moving away from the entry site   Remove blood and any other material with the gauze  Do this as often as needed  Use a new gauze bandage each time you clean the area, moving away from the entry site  ? For the second cleaning, wet a new gauze bandage with water  You may be directed to use saline solution instead  Begin at the entry site of the tube  Wipe the skin in circles, moving away from the entry site  Use a new gauze bandage each time you clean the area, moving away from the entry site  ? Gently pat the skin with a clean washcloth to dry it  Put medicine on the skin if directed by a healthcare provider  · Apply the skin barrier and bandages  ? Cut an opening in the center of the skin barrier large enough to fit around the tube  Cut a slit from the outside edge of the skin barrier to the center opening so that you can fit it around the nephrostomy tube  Place the skin barrier around the nephrostomy tube  Make sure the skin barrier is not touching stitches that may be holding the tube in place  Put a protective skin film over the skin barrier if directed by a healthcare provider  ? Roll up a bandage to make it thick, and wrap it around the place where the tube enters the skin  Place it to support the tube, and stop it from kinking or bending  Tape the bandage in place, and apply more bandages if directed by a healthcare provider  ? An attachment device may be placed over the bandages to help keep the nephrostomy tube in place  ? Bring the tubing forward to the front and tape it to the skin  Do not stretch the tube tight, because this may pull the nephrostomy tube out  How often to change the bandage, skin barrier, and tube attachment device:  Change the bandage around the tube, the bolsters, skin barriers, and tube attachment devices at least every 7 days  If your bandages, barriers, or devices get dirty or wet, change them right away, and as often as needed   If your nephrostomy tube is to be used for a long period of time, the tube needs to be changed every 2 to 3 months  Healthcare providers will tell you when you need to make an appointment to have your tube changed  How to care for the urine drainage bag: You may use a reusable or a single-use (disposable) urine bag  If you are using a disposable urine bag, use it only once, and then throw it away  If you have a reusable urine drainage bag, ask when and how to clean it  The following are general directions for cleaning a reusable urine drainage bag:  · Ask if you need to measure and write down how much urine is in the bag before you empty it  Drain urine out of the drainage bag when it is ½ to ? full  Open the spout at the bottom of the bag to empty the urine into the toilet  · You may need to detach the drainage bag from the nephrostomy tube to clean it  If so, attach a new drainage bag tightly to the nephrostomy tube  · You may need to use a solution such as phosphoric acid to clean the bag  Ask what kind of cleaning solution to use  Use a plastic syringe (without a needle) to gently force the cleaning solution into the drainage bag as you clean it  · Ask if you should leave the cleaning solution in the bag for a time before you drain it out  When it is time to drain the bag, drain the cleaning solution out through the spout at the bottom  · Ask what to use to rinse the urine drainage bag  After you rinse the bag, empty it and hang it up to air dry before you use it again  Throw reusable bags away after you use them for 1 week  How to prevent problems with your nephrostomy tube:   · Change bandages, skin barriers, and attachment devices as directed  This helps to prevent infection  Throw away or clean your drainage bag as directed by your healthcare provider  · Wipe the connecting ends of the drainage bag with alcohol or iodine before you reconnect the bag to the tube  This helps prevent infection       · Keep the tube taped to your skin and connected to a drainage bag placed below the level of your kidneys  This helps prevent urine from backing up into your kidneys  You may wear a small drainage bag strapped to your leg to let you move around more easily  · Use a larger drainage bag at night and when you take naps during the day  This will help prevent urine from leaking out from the place where the tube enters your skin  · Check the catheter to be sure it is in place after you change your clothes or do other activities  Do not wear tight clothing over the tube  Place the tubing over your thigh rather than under it when you are sitting down  Be sure that nothing is pulling on the nephrostomy tube when you move around  · Change positions if you see little or no urine in your drainage bag  Check to see if the urine tube is twisted or bent  Be sure that you are not sitting or lying on the tube  If there are no kinks and there is little or no urine in the drainage bag, tell your healthcare provider  · Flush out the tube as directed  Do this if you think the tube is blocked  Other things to know:   · Drink 2 to 3 liters of liquid each day  unless you were told to limit liquids because of another condition  This amount is equal to about 8 to 12 (eight-ounce) cups of liquid  There should be 30 to 60 milliliters of urine draining into the bag each hour  A large amount of urine that drains over a shorter period of time should be reported  For example, 2,000 milliliters (2 liters) of urine draining out over 8 hours could be a sign of problems  · Keep the site covered while you shower  Tape a piece of clear adhesive plastic over the dressing to keep it dry while you shower  Do not take tub baths  Contact your healthcare provider if:   · The skin around the nephrostomy tube is red, swollen, itches, or has a rash  · You have a fever  · You have lower back or hip pain  · There are changes in how your urine looks or smells      · You have large amounts of urine draining into the drainage bag over a short period of time  · You have little or no urine draining from the nephrostomy tube  · You have nausea and are vomiting  · The black amado on your tube has moved, or the tube is longer than when it was put in      · You have questions or concerns about your condition or care  Seek care immediately or call 911 if:   · The nephrostomy tube comes out completely  · There is blood, pus, or a bad smell coming from the place where the tube enters your skin  · Urine is leaking around the tube 10 days after the tube was placed  © Copyright [employer] 2021 Information is for End User's use only and may not be sold, redistributed or otherwise used for commercial purposes  All illustrations and images included in CareNotes® are the copyrighted property of [employer]  or Alvin Meier  The above information is an  only  It is not intended as medical advice for individual conditions or treatments  Talk to your doctor, nurse or pharmacist before following any medical regimen to see if it is safe and effective for you

## 2021-09-22 ENCOUNTER — OFFICE VISIT (OUTPATIENT)
Dept: HEMATOLOGY ONCOLOGY | Facility: CLINIC | Age: 76
End: 2021-09-22
Payer: COMMERCIAL

## 2021-09-22 VITALS
DIASTOLIC BLOOD PRESSURE: 80 MMHG | HEIGHT: 60 IN | OXYGEN SATURATION: 97 % | WEIGHT: 186.8 LBS | RESPIRATION RATE: 18 BRPM | SYSTOLIC BLOOD PRESSURE: 142 MMHG | HEART RATE: 64 BPM | TEMPERATURE: 98 F | BODY MASS INDEX: 36.67 KG/M2

## 2021-09-22 DIAGNOSIS — I87.8 POOR VENOUS ACCESS: ICD-10-CM

## 2021-09-22 DIAGNOSIS — D63.1 ANEMIA IN STAGE 5 CHRONIC KIDNEY DISEASE, NOT ON CHRONIC DIALYSIS (HCC): ICD-10-CM

## 2021-09-22 DIAGNOSIS — D50.0 IRON DEFICIENCY ANEMIA DUE TO CHRONIC BLOOD LOSS: ICD-10-CM

## 2021-09-22 DIAGNOSIS — N13.30 HYDRONEPHROSIS OF LEFT KIDNEY: ICD-10-CM

## 2021-09-22 DIAGNOSIS — D45 POLYCYTHEMIA VERA (HCC): ICD-10-CM

## 2021-09-22 DIAGNOSIS — D32.9 MENINGIOMA (HCC): ICD-10-CM

## 2021-09-22 DIAGNOSIS — D50.9 IRON DEFICIENCY ANEMIA, UNSPECIFIED IRON DEFICIENCY ANEMIA TYPE: Primary | ICD-10-CM

## 2021-09-22 DIAGNOSIS — N18.5 ANEMIA IN STAGE 5 CHRONIC KIDNEY DISEASE, NOT ON CHRONIC DIALYSIS (HCC): ICD-10-CM

## 2021-09-22 PROCEDURE — 99215 OFFICE O/P EST HI 40 MIN: CPT | Performed by: PHYSICIAN ASSISTANT

## 2021-09-22 NOTE — H&P (VIEW-ONLY)
Hematology/Oncology Outpatient Follow- up Note  Jhon Meaz 77 y o  female MRN: @ Encounter: [id number]        Date:  9/22/2021      Assessment / Plan:    3 56-year-old  female with polycythemia vera positive for JAK2 mutation she had been on ruxolitinib 10 mg p o  daily since 2016 with significant improvement of constitutional symptoms, disappearance of splenomegaly  2  Multiple DVT/ PE, she is on apixaban 2 5 mg p o  b i d  indefinitely     3  Chronic kidney disease grade 3-4, status post AV fistula for preparation for hemodialysis in the future, she is on Aranesp 100 micro g every month if hemoglobin below 9 9  Last dose 6/21/21 2nd to her multiple hospitalizations  4  Growing right adnexal cystic structure, growing since 2015, Gynecology evaluation declined by patient  5  Anemia  Will recheck iron studies  Iron saturation 7% 5/12/21 and 32% 5/21  She did receive transfusion PRBC 5/12/21 during her 5/2021 admission  6   Recurrent SBO requiring NG tube decompression, surgery  7  Chronic bilateral hydronephrosis with stent placement  Ultimately she underwent  Supratrigonal cystectomy and ileal conduit in 2016 for nonfunctional bladder causing bilateral hydronephrosis  No evidence of malignancy  In 10/2020  Hydronephrosis and hematuria 5/2021  S/P Nephrostomy tube placement 5/11/21  8   Covid pneumonia 8/2021 requiring hospitalization  Due to her profound anemia, hold Jakafi  Will give IV iron x 2 and then resume aranesp  F/U in 6 weeks with repeat labs  HPI:    She is 56-year-old  female with history of polycythemia vera diagnosed in 2006 with thrombocytosis, erythrocytosis with hemoglobin of 20 6, hematocrit 54, positive for JAK2 mutation diagnosed at [facility]   She had been on hydroxyurea also phlebotomy    In May 2014 she was on Hydrea 500mg alternating with 1000mg and required phlebotomy approximately every 3 months  In May 2015, her HCT was still 49% and she was experiencing abdominal discomfort 2nd to splenomegaly, pruritus, night sweats, fatigue  She was initiated on initiated on ruxolitinib 10 mg p o  B i d and Hydrea was tapered down  Treatment interrupted in latter half of 2015 due to recurrent UTI with extensive inflammation of the bladder with subsequent bilateral hydronephrosis status post stent to the right ureter  Ruxolitinib could cause UTI by 6% of the cases according to the PI and it was placed on hold 8/2015  Restarted 12/2015  Ruxolitinib reduced shortly thereafter to 10 mg p o  Daily 2nd to pancytopenia  She had significant clinical improvement with  no evidence of splenomegaly  Hemoglobin was 11-12g/dL 2019 outpatient (lower during her hospitalizations)        2  Ultimately, she underwent  Supratrigonal cystectomy and ileal conduit for nonfunctional bladder causing bilateral hydronephrosis  No evidence of malignancy  3   She had chronic lower extremity deep venous thrombosis diagnosed in 2007 treated with Coumadin and later on she had large saddle pulmonary embolus in the right upper lobe branches as well in 07/2017  She had pre-operative IVC filter placed prior to her cystectomy  IVC filter was removed 12/2016  She had been on apixaban 2 5 mg p o  B i d      4   Recurrent SBO requiring multiple hospitalizations since February 2018  5   Herpes zoster on 08/2020     6  incarcerated 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/2020 requiring laparotomy  Hemoglobin down to 6 6g/dL  Hemoglobin has been 7-8 g/dL range  7   Recurrent SBO 4/2021  8  Hematuria and pyelonephritis 5/2021  She had left percutaneous nephrostomy placed  9   8/2021 Covid infection     10  Right adnexal cyst 8 x 4 4 cm on 5/2021 CT scan  Increased in size since 2015 when it measured 5 5cm    She has decline Gynecology evaluation        Interval History:        Test Results: Labs:   Lab Results   Component Value Date    HGB 8 1 (L) 09/10/2021    HCT 26 5 (L) 09/10/2021    MCV 98 09/10/2021     09/10/2021    WBC 5 78 09/10/2021    NRBC 0 09/10/2021    BANDSPCT 2 05/15/2021    ATYLMPCT 1 (H) 07/28/2021     Lab Results   Component Value Date     12/17/2015    K 4 2 09/10/2021     (H) 09/10/2021    CO2 22 09/10/2021    ANIONGAP 9 12/17/2015    BUN 38 (H) 09/10/2021    CREATININE 3 42 (H) 09/10/2021    GLUCOSE 138 10/04/2016    GLUF 89 09/10/2021    CALCIUM 8 5 09/10/2021    CORRECTEDCA 9 4 09/10/2021    AST 18 08/02/2021    ALT 13 08/02/2021    ALKPHOS 54 08/02/2021    PROT 6 2 (L) 11/06/2015    BILITOT 0 49 11/06/2015    EGFR 12 09/10/2021       Imaging: IR nephrostomy tube placement    Result Date: 9/21/2021  Narrative: PROCEDURE: Nephrostomy tube placement  Retrograde PCN U replacement  STAFF: ROXANN Kimbrough  CONTRAST: 40 mL Omnipaque 350  FLUOROSCOPY TIME: 25 minutes  NUMBER OF IMAGES: Multiple COMPLICATIONS: None  MEDICATIONS: General anesthesia was provided by the anesthesia team, please refer to their note for details  INDICATION: History of left ilial conduit ureteral obstruction, managed by retrograde left PCNU  The tube has recently become completely dislodged  PROCEDURE: At presentation, the patient's retrograde PCN U has been completely dislodged  The patient was placed left side upon the table to gain access to both the left kidney as well as ostomy for replacement  The left kidney was localized with ultrasound, the skin and underlying subcutaneous tissues were anesthetized with local lidocaine  A 21-gauge needle was advanced into the renal collecting system under ultrasound guidance, injection of contrast confirmed placement within the collecting system  The system was upsized with an AccuStick transitional dilator, and with use of a angled catheter and Glidewire, access through the ileal conduit and out of the ostomy was achieved    The catheter was then  placed over the wire from the ostomy site and advanced back into the collecting system, after which a stiff Amplatz wire was placed  A new 40 cm x 10-Ghanaian locking pigtail retrograde PCNU was placed over the wire and advanced to the collecting system  The pigtail was locked  Injection of contrast confirmed appropriate placement  Considering this was a new access site into the left kidney, an 8-Ghanaian locking pigtail percutaneous nephrostomy tube was placed, to help avoid complications of removing this access just after placement  Injection of contrast through this drain demonstrated appropriate position within the collecting system, and brisk uptake of contrast into the retrograde PCNU  FINDINGS: Existing left PCN U completely dislodged at presentation  Moderate hydronephrosis of an atrophic left kidney  Impression: Successful left 10-Ghanaian by 40 cm retrograde PCN U placement  Left percutaneous nephrostomy tube placement  PLAN: The left percutaneous nephrostomy tube can be capped, patient should return in 2 weeks for possible removal  The retrograde PCN U  Rudy drain via the ostomy, the patient should return in 8-12 weeks for routine exchange  Workstation performed: [id number]     IR nephrostomy tube check/change/reposition/reinsertion/upsize    Result Date: 9/16/2021  Narrative: Retrograde left PCNU check and exchange  Clinical History: Left ureteral obstruction in the setting of prior conduit, percutaneous nephrostomy placed May Contrast: 10 mL of iohexol (OMNIPAQUE) Fluoro time: 2 6 Minutes Number of Images: Multiple Radiation dose: 76 mGy Conscious sedation time: N/A Technique/intervention: The patient was placed in a prone position on the fluoroscopic table  The abdomen was prepped and draped in the usual sterile fashion  Timeout was performed per protocol  Contrast was injected through the existing retrograde PCNU confirming appropriate location within the renal collecting system  The pigtail locking mechanism was released and the catheter was removed over a Bentson wire  A new 10-[place] by 45 cm all-purpose drainage catheter was advanced over the wire with the pigtail curled in the renal collecting system  The pigtail locking mechanism was engaged and contrast injected through the catheter confirming appropriate location within the renal collecting system  Impression: Impression: 1  Retrograde left PCNU check and exchange  Workstation performed: [id number]           ROS:  As mentioned in HPI & Interval History otherwise 14 point ROS negative  Allergies: Allergies   Allergen Reactions    Chlorhexidine Rash     petichi like rash when using chlorhexidine swabs prior to IV  Current Medications: Reviewed  PMH/FH/SH:  Reviewed      Physical Exam:    1 79 meters squared    Ht Readings from Last 3 Encounters:   09/22/21 5' (1 524 m)   09/15/21 5' (1 524 m)   07/15/21 5' (1 524 m)        Wt Readings from Last 3 Encounters:   09/15/21 82 1 kg (181 lb)   07/30/21 88 kg (194 lb 0 1 oz)   07/08/21 88 kg (194 lb)        Temp Readings from Last 3 Encounters:   09/20/21 (!) 96 7 °F (35 9 °C) (Tympanic)   08/03/21 98 4 °F (36 9 °C) (Oral)   07/08/21 99 °F (37 2 °C) (Tympanic)        BP Readings from Last 3 Encounters:   09/20/21 (!) 171/82   09/15/21 128/78   08/03/21 142/64           Physical Exam  Vitals reviewed  Constitutional:       General: She is not in acute distress  Appearance: She is well-developed  She is not diaphoretic  Comments: She looks tired   HENT:      Head: Normocephalic and atraumatic  Eyes:      Conjunctiva/sclera: Conjunctivae normal    Neck:      Trachea: No tracheal deviation  Cardiovascular:      Rate and Rhythm: Normal rate and regular rhythm  Heart sounds: No murmur heard  No friction rub  No gallop  Pulmonary:      Effort: Pulmonary effort is normal  No respiratory distress  Breath sounds: Normal breath sounds   No wheezing or rales  Chest:      Chest wall: No tenderness  Abdominal:      General: There is no distension  Palpations: Abdomen is soft  Tenderness: There is no abdominal tenderness  Musculoskeletal:      Cervical back: Normal range of motion and neck supple  Comments: cane   Lymphadenopathy:      Cervical: No cervical adenopathy  Skin:     General: Skin is warm and dry  Coloration: Skin is not pale  Findings: Bruising present  No erythema  Neurological:      Mental Status: She is alert and oriented to person, place, and time  Psychiatric:         Behavior: Behavior normal          Thought Content:  Thought content normal          Judgment: Judgment normal          ECO      Emergency Contacts:    Extended Emergency Contact Information  Primary Emergency Contact: [facility]  Mobile Phone: [phone]  Relation: Son  Secondary Emergency Contact: Merrick Villareal  Address: [address] [place] [address] Phone: [phone]  Mobile Phone: [phone]  Relation: Daughter

## 2021-09-22 NOTE — PROGRESS NOTES
Hematology/Oncology Outpatient Follow- up Note  Michael Huber 77 y o  female MRN: @ Encounter: [id number]        Date:  9/22/2021      Assessment / Plan:    3 60-year-old  female with polycythemia vera positive for JAK2 mutation she had been on ruxolitinib 10 mg p o  daily since 2016 with significant improvement of constitutional symptoms, disappearance of splenomegaly  2  Multiple DVT/ PE, she is on apixaban 2 5 mg p o  b i d  indefinitely     3  Chronic kidney disease grade 3-4, status post AV fistula for preparation for hemodialysis in the future, she is on Aranesp 100 micro g every month if hemoglobin below 9 9  Last dose 6/21/21 2nd to her multiple hospitalizations  4  Growing right adnexal cystic structure, growing since 2015, Gynecology evaluation declined by patient  5  Anemia  Will recheck iron studies  Iron saturation 7% 5/12/21 and 32% 5/21  She did receive transfusion PRBC 5/12/21 during her 5/2021 admission  6   Recurrent SBO requiring NG tube decompression, surgery  7  Chronic bilateral hydronephrosis with stent placement  Ultimately she underwent  Supratrigonal cystectomy and ileal conduit in 2016 for nonfunctional bladder causing bilateral hydronephrosis  No evidence of malignancy  In 10/2020  Hydronephrosis and hematuria 5/2021  S/P Nephrostomy tube placement 5/11/21  8   Covid pneumonia 8/2021 requiring hospitalization  Due to her profound anemia, hold Jakafi  Will give IV iron x 2 and then resume aranesp  F/U in 6 weeks with repeat labs  HPI:    She is 18-year-old  female with history of polycythemia vera diagnosed in 2006 with thrombocytosis, erythrocytosis with hemoglobin of 20 6, hematocrit 54, positive for JAK2 mutation diagnosed at [facility]   She had been on hydroxyurea also phlebotomy    In May 2014 she was on Hydrea 500mg alternating with 1000mg and required phlebotomy approximately every 3 months  In May 2015, her HCT was still 49% and she was experiencing abdominal discomfort 2nd to splenomegaly, pruritus, night sweats, fatigue  She was initiated on initiated on ruxolitinib 10 mg p o  B i d and Hydrea was tapered down  Treatment interrupted in latter half of 2015 due to recurrent UTI with extensive inflammation of the bladder with subsequent bilateral hydronephrosis status post stent to the right ureter  Ruxolitinib could cause UTI by 6% of the cases according to the PI and it was placed on hold 8/2015  Restarted 12/2015  Ruxolitinib reduced shortly thereafter to 10 mg p o  Daily 2nd to pancytopenia  She had significant clinical improvement with  no evidence of splenomegaly  Hemoglobin was 11-12g/dL 2019 outpatient (lower during her hospitalizations)        2  Ultimately, she underwent  Supratrigonal cystectomy and ileal conduit for nonfunctional bladder causing bilateral hydronephrosis  No evidence of malignancy  3   She had chronic lower extremity deep venous thrombosis diagnosed in 2007 treated with Coumadin and later on she had large saddle pulmonary embolus in the right upper lobe branches as well in 07/2017  She had pre-operative IVC filter placed prior to her cystectomy  IVC filter was removed 12/2016  She had been on apixaban 2 5 mg p o  B i d      4   Recurrent SBO requiring multiple hospitalizations since February 2018  5   Herpes zoster on 08/2020     6  incarcerated 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/2020 requiring laparotomy  Hemoglobin down to 6 6g/dL  Hemoglobin has been 7-8 g/dL range  7   Recurrent SBO 4/2021  8  Hematuria and pyelonephritis 5/2021  She had left percutaneous nephrostomy placed  9   8/2021 Covid infection     10  Right adnexal cyst 8 x 4 4 cm on 5/2021 CT scan  Increased in size since 2015 when it measured 5 5cm    She has decline Gynecology evaluation        Interval History:        Test Results: Labs:   Lab Results   Component Value Date    HGB 8 1 (L) 09/10/2021    HCT 26 5 (L) 09/10/2021    MCV 98 09/10/2021     09/10/2021    WBC 5 78 09/10/2021    NRBC 0 09/10/2021    BANDSPCT 2 05/15/2021    ATYLMPCT 1 (H) 07/28/2021     Lab Results   Component Value Date     12/17/2015    K 4 2 09/10/2021     (H) 09/10/2021    CO2 22 09/10/2021    ANIONGAP 9 12/17/2015    BUN 38 (H) 09/10/2021    CREATININE 3 42 (H) 09/10/2021    GLUCOSE 138 10/04/2016    GLUF 89 09/10/2021    CALCIUM 8 5 09/10/2021    CORRECTEDCA 9 4 09/10/2021    AST 18 08/02/2021    ALT 13 08/02/2021    ALKPHOS 54 08/02/2021    PROT 6 2 (L) 11/06/2015    BILITOT 0 49 11/06/2015    EGFR 12 09/10/2021       Imaging: IR nephrostomy tube placement    Result Date: 9/21/2021  Narrative: PROCEDURE: Nephrostomy tube placement  Retrograde PCN U replacement  STAFF: ROXANN Mcpherson Mt  CONTRAST: 40 mL Omnipaque 350  FLUOROSCOPY TIME: 25 minutes  NUMBER OF IMAGES: Multiple COMPLICATIONS: None  MEDICATIONS: General anesthesia was provided by the anesthesia team, please refer to their note for details  INDICATION: History of left ilial conduit ureteral obstruction, managed by retrograde left PCNU  The tube has recently become completely dislodged  PROCEDURE: At presentation, the patient's retrograde PCN U has been completely dislodged  The patient was placed left side upon the table to gain access to both the left kidney as well as ostomy for replacement  The left kidney was localized with ultrasound, the skin and underlying subcutaneous tissues were anesthetized with local lidocaine  A 21-gauge needle was advanced into the renal collecting system under ultrasound guidance, injection of contrast confirmed placement within the collecting system  The system was upsized with an AccuStick transitional dilator, and with use of a angled catheter and Glidewire, access through the ileal conduit and out of the ostomy was achieved    The catheter was then  placed over the wire from the ostomy site and advanced back into the collecting system, after which a stiff Amplatz wire was placed  A new 40 cm x 10-[place] locking pigtail retrograde PCNU was placed over the wire and advanced to the collecting system  The pigtail was locked  Injection of contrast confirmed appropriate placement  Considering this was a new access site into the left kidney, an 8-[place] locking pigtail percutaneous nephrostomy tube was placed, to help avoid complications of removing this access just after placement  Injection of contrast through this drain demonstrated appropriate position within the collecting system, and brisk uptake of contrast into the retrograde PCNU  FINDINGS: Existing left PCN U completely dislodged at presentation  Moderate hydronephrosis of an atrophic left kidney  Impression: Successful left 10-[place] by 40 cm retrograde PCN U placement  Left percutaneous nephrostomy tube placement  PLAN: The left percutaneous nephrostomy tube can be capped, patient should return in 2 weeks for possible removal  The retrograde PCN U  Rudy drain via the ostomy, the patient should return in 8-12 weeks for routine exchange  Workstation performed: [id number]     IR nephrostomy tube check/change/reposition/reinsertion/upsize    Result Date: 9/16/2021  Narrative: Retrograde left PCNU check and exchange  Clinical History: Left ureteral obstruction in the setting of prior conduit, percutaneous nephrostomy placed May Contrast: 10 mL of iohexol (OMNIPAQUE) Fluoro time: 2 6 Minutes Number of Images: Multiple Radiation dose: 76 mGy Conscious sedation time: N/A Technique/intervention: The patient was placed in a prone position on the fluoroscopic table  The abdomen was prepped and draped in the usual sterile fashion  Timeout was performed per protocol  Contrast was injected through the existing retrograde PCNU confirming appropriate location within the renal collecting system  The pigtail locking mechanism was released and the catheter was removed over a Bentson wire  A new 10-[place] by 45 cm all-purpose drainage catheter was advanced over the wire with the pigtail curled in the renal collecting system  The pigtail locking mechanism was engaged and contrast injected through the catheter confirming appropriate location within the renal collecting system  Impression: Impression: 1  Retrograde left PCNU check and exchange  Workstation performed: [id number]           ROS:  As mentioned in HPI & Interval History otherwise 14 point ROS negative  Allergies: Allergies   Allergen Reactions    Chlorhexidine Rash     petichi like rash when using chlorhexidine swabs prior to IV  Current Medications: Reviewed  PMH/FH/SH:  Reviewed      Physical Exam:    1 79 meters squared    Ht Readings from Last 3 Encounters:   09/22/21 5' (1 524 m)   09/15/21 5' (1 524 m)   07/15/21 5' (1 524 m)        Wt Readings from Last 3 Encounters:   09/15/21 82 1 kg (181 lb)   07/30/21 88 kg (194 lb 0 1 oz)   07/08/21 88 kg (194 lb)        Temp Readings from Last 3 Encounters:   09/20/21 (!) 96 7 °F (35 9 °C) (Tympanic)   08/03/21 98 4 °F (36 9 °C) (Oral)   07/08/21 99 °F (37 2 °C) (Tympanic)        BP Readings from Last 3 Encounters:   09/20/21 (!) 171/82   09/15/21 128/78   08/03/21 142/64           Physical Exam  Vitals reviewed  Constitutional:       General: She is not in acute distress  Appearance: She is well-developed  She is not diaphoretic  Comments: She looks tired   HENT:      Head: Normocephalic and atraumatic  Eyes:      Conjunctiva/sclera: Conjunctivae normal    Neck:      Trachea: No tracheal deviation  Cardiovascular:      Rate and Rhythm: Normal rate and regular rhythm  Heart sounds: No murmur heard  No friction rub  No gallop  Pulmonary:      Effort: Pulmonary effort is normal  No respiratory distress  Breath sounds: Normal breath sounds   No wheezing or rales  Chest:      Chest wall: No tenderness  Abdominal:      General: There is no distension  Palpations: Abdomen is soft  Tenderness: There is no abdominal tenderness  Musculoskeletal:      Cervical back: Normal range of motion and neck supple  Comments: cane   Lymphadenopathy:      Cervical: No cervical adenopathy  Skin:     General: Skin is warm and dry  Coloration: Skin is not pale  Findings: Bruising present  No erythema  Neurological:      Mental Status: She is alert and oriented to person, place, and time  Psychiatric:         Behavior: Behavior normal          Thought Content:  Thought content normal          Judgment: Judgment normal          ECO      Emergency Contacts:    Extended Emergency Contact Information  Primary Emergency Contact: [facility]  Mobile Phone: [phone]  Relation: Son  Secondary Emergency Contact: Anahimaria de jesus [facility]  Address: [address] [place] [address] Phone: [phone]  Mobile Phone: [phone]  Relation: Daughter

## 2021-09-23 NOTE — TELEPHONE ENCOUNTER
Is patient requesting a call when authorization has been obtained? Patient did not request a call  Surgery Date: 9/30/21  Primary Surgeon: LAURI // Doctor, Benedicto Harper (NPI: [id number])  Assisting Surgeon: Not Applicable (N/A)  Facility: [facility] (Tax: [id number] / NPI: [id number])  Inpatient / Outpatient: Outpatient  Level: 2    Clearance Received: No clearance ordered  Consent Received: Yes, scanned into Epic on 7/8/21  Medication Hold / Last Dose: Hold on Eliquis 2 days prior  Last dose 9/27/21  VQI Spreadsheet: Not Applicable (N/A)  IR Notified: Not Applicable (N/A)  Rep  Notified: Not Applicable (N/A)  Equipment Needs: Not Applicable (N/A)  Vas Lab Requested: Not Applicable (N/A)  Patient Contacted: 9/23/21    Diagnosis: N18 5  Procedure/ CPT Code(s): REVISION of Arteriovenous Graft withOUT Thrombosis // CPT: 06416    For varicose vein related procedures, last LEVDR? Not Applicable (N/A)    Post Operative Date/ Time: 10/13/21 , 4:30pm Danny with PA - Abidappdamion President (NPI: [id number])     Pt is aware last dose of Eliquis is 9/27/21

## 2021-09-27 NOTE — TELEPHONE ENCOUNTER
Authorization requirements reviewed  Please refer to Agusto Pacheco / Maureen Yamile number [id number] for case updates

## 2021-09-27 NOTE — TELEPHONE ENCOUNTER
Spoke to patient to remind her that today will be her last dose of Eliquis prior to surgery on 9-30-21  Patient confimed and understood the instructions   LLF

## 2021-09-28 ENCOUNTER — ANESTHESIA EVENT (OUTPATIENT)
Dept: PERIOP | Facility: HOSPITAL | Age: 76
DRG: 981 | End: 2021-09-28
Payer: COMMERCIAL

## 2021-09-28 NOTE — PRE-PROCEDURE INSTRUCTIONS
Pre-Surgery Instructions:   Medication Instructions    acetaminophen (TYLENOL) 325 mg tablet Instructed patient per Anesthesia Guidelines  prn,may take    atorvastatin (LIPITOR) 40 mg tablet Instructed patient per Anesthesia Guidelines  takes hs    calcitriol (ROCALTROL) 0 25 mcg capsule Instructed patient per Anesthesia Guidelines  take am of sx    Cholecalciferol (VITAMIN D3) 1000 UNITS CAPS Instructed patient per Anesthesia Guidelines  hold am of sx    citalopram (CeleXA) 20 mg tablet Instructed patient per Anesthesia Guidelines  take am of sx    levothyroxine 75 mcg tablet Instructed patient per Anesthesia Guidelines  may take am of sx    loperamide (IMODIUM) 2 mg capsule Instructed patient per Anesthesia Guidelines  prn,old am of    melatonin 3 mg Instructed patient per Anesthesia Guidelines   metoprolol tartrate (LOPRESSOR) 25 mg tablet Instructed patient per Anesthesia Guidelines  take am of sx    saccharomyces boulardii (Florastor) 250 mg capsule Instructed patient per Anesthesia Guidelines  prn    sodium bicarbonate 650 mg tablet Instructed patient per Anesthesia Guidelines  will take post op    You will receive a phone call from hospital for arrival time  Please call surgeons office if any changes in your condition  Wear easy on/off clothing; consider type of surgery;  Valuables, jewelry, piercing's please keep at home  **COVID-19  education/surgical guidelines      Please: No contact lenses or eye make up, artificial eyelashes    Please secure transportation     Follow pre surgery showering or cleaning instructions as  Reviewed by nurse or surgeons office      Questions answered and concerns addressed

## 2021-09-29 ENCOUNTER — HOSPITAL ENCOUNTER (OUTPATIENT)
Dept: INFUSION CENTER | Facility: HOSPITAL | Age: 76
Discharge: HOME | DRG: 981 | End: 2021-09-29
Attending: INTERNAL MEDICINE
Payer: COMMERCIAL

## 2021-09-29 VITALS
TEMPERATURE: 97 F | RESPIRATION RATE: 18 BRPM | HEART RATE: 64 BPM | SYSTOLIC BLOOD PRESSURE: 128 MMHG | DIASTOLIC BLOOD PRESSURE: 68 MMHG

## 2021-09-29 DIAGNOSIS — D50.0 IRON DEFICIENCY ANEMIA DUE TO CHRONIC BLOOD LOSS: Primary | ICD-10-CM

## 2021-09-29 DIAGNOSIS — D63.1 ANEMIA IN STAGE 5 CHRONIC KIDNEY DISEASE, NOT ON CHRONIC DIALYSIS (HCC): ICD-10-CM

## 2021-09-29 DIAGNOSIS — N18.5 ANEMIA IN STAGE 5 CHRONIC KIDNEY DISEASE, NOT ON CHRONIC DIALYSIS (HCC): ICD-10-CM

## 2021-09-29 LAB
BASOPHILS # BLD AUTO: 0.03 THOUSANDS/ΜL (ref 0–0.1)
BASOPHILS NFR BLD AUTO: 1 % (ref 0–1)
EOSINOPHIL # BLD AUTO: 0.09 THOUSAND/ΜL (ref 0–0.61)
EOSINOPHIL NFR BLD AUTO: 2 % (ref 0–6)
ERYTHROCYTE [DISTWIDTH] IN BLOOD BY AUTOMATED COUNT: 15.4 % (ref 11.6–15.1)
HCT VFR BLD AUTO: 24.7 % (ref 34.8–46.1)
HGB BLD-MCNC: 7.6 G/DL (ref 11.5–15.4)
IMM GRANULOCYTES # BLD AUTO: 0.07 THOUSAND/UL (ref 0–0.2)
IMM GRANULOCYTES NFR BLD AUTO: 1 % (ref 0–2)
LYMPHOCYTES # BLD AUTO: 0.68 THOUSANDS/ΜL (ref 0.6–4.47)
LYMPHOCYTES NFR BLD AUTO: 14 % (ref 14–44)
MCH RBC QN AUTO: 30.2 PG (ref 26.8–34.3)
MCHC RBC AUTO-ENTMCNC: 30.8 G/DL (ref 31.4–37.4)
MCV RBC AUTO: 98 FL (ref 82–98)
MONOCYTES # BLD AUTO: 0.52 THOUSAND/ΜL (ref 0.17–1.22)
MONOCYTES NFR BLD AUTO: 11 % (ref 4–12)
NEUTROPHILS # BLD AUTO: 3.48 THOUSANDS/ΜL (ref 1.85–7.62)
NEUTS SEG NFR BLD AUTO: 71 % (ref 43–75)
NRBC BLD AUTO-RTO: 0 /100 WBCS
PLATELET # BLD AUTO: 266 THOUSANDS/UL (ref 149–390)
PMV BLD AUTO: 9.8 FL (ref 8.9–12.7)
RBC # BLD AUTO: 2.52 MILLION/UL (ref 3.81–5.12)
WBC # BLD AUTO: 4.87 THOUSAND/UL (ref 4.31–10.16)

## 2021-09-29 PROCEDURE — 96365 THER/PROPH/DIAG IV INF INIT: CPT

## 2021-09-29 PROCEDURE — 85025 COMPLETE CBC W/AUTO DIFF WBC: CPT | Performed by: INTERNAL MEDICINE

## 2021-09-29 RX ORDER — SODIUM CHLORIDE 9 MG/ML
20 INJECTION, SOLUTION INTRAVENOUS ONCE
Status: CANCELLED | OUTPATIENT
Start: 2021-10-07

## 2021-09-29 RX ORDER — SODIUM CHLORIDE 9 MG/ML
20 INJECTION, SOLUTION INTRAVENOUS ONCE
Status: COMPLETED | OUTPATIENT
Start: 2021-09-29 | End: 2021-09-29

## 2021-09-29 RX ADMIN — FERUMOXYTOL 510 MG: 510 INJECTION INTRAVENOUS at 11:58

## 2021-09-29 RX ADMIN — SODIUM CHLORIDE 20 ML/HR: 0.9 INJECTION, SOLUTION INTRAVENOUS at 11:58

## 2021-09-29 NOTE — ANESTHESIA PREPROCEDURE EVALUATION
Procedure:  Superficialization and transposition of right brachiobasilic fistula (Right Arm Upper)    Relevant Problems   ANESTHESIA (within normal limits)      CARDIO   (+) Chronic saddle pulmonary embolism (HCC)   (+) Chronic thrombosis of subclavian vein (HCC)   (+) Hyperlipemia   (+) Hypertension      ENDO   (+) Secondary hyperparathyroidism of renal origin (HCC)      GI/HEPATIC   (+) Small bowel obstruction (HCC)      /RENAL   (+) Acute renal failure superimposed on stage 5 chronic kidney disease, not on chronic dialysis (HCC)   (+) Hydronephrosis of left kidney   (+) Obstructive left pyelonephritis   (+) Stage 5 chronic kidney disease not on chronic dialysis (HCC)      HEMATOLOGY   (+) Anemia   (+) Anemia in CKD (chronic kidney disease)   (+) Iron deficiency anemia due to chronic blood loss      NEURO/PSYCH   (+) Depression   (+) History of Clostridioides difficile colitis   (+) History of shingles   (+) Intraventricular hemorrhage (HCC)      PULMONARY (within normal limits)      Other   (+) 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)   (+) Bladder mass   (+) Class 2 severe obesity due to excess calories with serious comorbidity and body mass index (BMI) of 35 0 to 35 9 in adult [facility])   (+) Meningioma (HCC)   (+) Polycythemia vera (HCC)   (+) Thoracic compression fracture (HCC)      Eliquis last taken Monday    EKG 7/15/2021:  Normal sinus rhythm  Nonspecific ST abnormality  Abnormal ECG    Nuclear Stress 12/2020:  -  Stress results: There was no chest pain during stress  -  ECG conclusions: The stress ECG was negative for ischemia and normal   -  Perfusion imaging: There was a moderate-sized, mildly severe, reversible myocardial perfusion defect of the entire anterior wall  -  Gated SPECT: The calculated left ventricular ejection fraction was 75 %   There was no left ventricular regional abnormality      IMPRESSIONS: Abnormal study after pharmacologic vasodilation without reproduction of symptoms  There was a moderate amount of ischemia in the anterior region  Left ventricular systolic function was normal     TTE 12/2020:  LEFT VENTRICLE:  Systolic function was normal  Ejection fraction was estimated to be 55 %  There were no regional wall motion abnormalities  Wall thickness was mildly increased  There was mild concentric hypertrophy  Features were consistent with a pseudonormal left ventricular filling pattern, with concomitant abnormal relaxation and increased filling pressure (grade 2 diastolic dysfunction)      LEFT ATRIUM:  The atrium was mildly dilated      MITRAL VALVE:  There was mild annular calcification  There was trace regurgitation      AORTIC VALVE:  There was trace regurgitation      TRICUSPID VALVE:  There was mild regurgitation      PULMONIC VALVE:  There was trace regurgitation  Lab Results   Component Value Date    WBC 4 87 09/29/2021    HGB 7 6 (L) 09/29/2021    HCT 24 7 (L) 09/29/2021    MCV 98 09/29/2021     09/29/2021     Lab Results   Component Value Date    SODIUM 137 09/10/2021    K 4 2 09/10/2021     (H) 09/10/2021    CO2 22 09/10/2021    BUN 38 (H) 09/10/2021    CREATININE 3 42 (H) 09/10/2021    GLUC 72 08/11/2021    CALCIUM 8 5 09/10/2021     Lab Results   Component Value Date    INR 1 36 (H) 07/22/2021    INR 1 39 (H) 05/11/2021    INR 1 27 (H) 05/10/2021    PROTIME 16 8 (H) 07/22/2021    PROTIME 17 0 (H) 05/11/2021    PROTIME 15 9 (H) 05/10/2021     Lab Results   Component Value Date    HGBA1C 5 5 09/02/2020          Physical Exam    Airway    Mallampati score: I  TM Distance: >3 FB  Neck ROM: full     Dental       Cardiovascular  Cardiovascular exam normal    Pulmonary  Pulmonary exam normal     Other Findings        Anesthesia Plan  ASA Score- 3     Anesthesia Type- general with ASA Monitors  Additional Monitors:   Airway Plan: LMA  Plan Factors-    Chart reviewed  EKG reviewed  Existing labs reviewed   Patient summary reviewed  Induction- intravenous  Postoperative Plan-     Informed Consent- Anesthetic plan and risks discussed with patient  I personally reviewed this patient with the CRNA  Discussed and agreed on the Anesthesia Plan with the CRNA  Terrence Montes

## 2021-09-30 ENCOUNTER — DOCUMENTATION (OUTPATIENT)
Dept: INTERVENTIONAL RADIOLOGY/VASCULAR | Facility: CLINIC | Age: 76
End: 2021-09-30

## 2021-09-30 ENCOUNTER — ANESTHESIA (OUTPATIENT)
Dept: PERIOP | Facility: HOSPITAL | Age: 76
DRG: 981 | End: 2021-09-30
Payer: COMMERCIAL

## 2021-09-30 ENCOUNTER — HOSPITAL ENCOUNTER (INPATIENT)
Facility: HOSPITAL | Age: 76
LOS: 4 days | Discharge: HOME HEALTH | DRG: 981 | End: 2021-10-05
Attending: SURGERY | Admitting: SURGERY
Payer: COMMERCIAL

## 2021-09-30 DIAGNOSIS — D45 POLYCYTHEMIA VERA (HCC): ICD-10-CM

## 2021-09-30 DIAGNOSIS — N18.5 ANEMIA IN STAGE 5 CHRONIC KIDNEY DISEASE, NOT ON CHRONIC DIALYSIS (HCC): ICD-10-CM

## 2021-09-30 DIAGNOSIS — I87.8 POOR VENOUS ACCESS: ICD-10-CM

## 2021-09-30 DIAGNOSIS — R26.2 AMBULATORY DYSFUNCTION: ICD-10-CM

## 2021-09-30 DIAGNOSIS — N18.5 STAGE 5 CHRONIC KIDNEY DISEASE NOT ON CHRONIC DIALYSIS (HCC): Primary | ICD-10-CM

## 2021-09-30 DIAGNOSIS — N13.30 HYDRONEPHROSIS OF LEFT KIDNEY: ICD-10-CM

## 2021-09-30 DIAGNOSIS — D63.1 ANEMIA IN STAGE 5 CHRONIC KIDNEY DISEASE, NOT ON CHRONIC DIALYSIS (HCC): ICD-10-CM

## 2021-09-30 LAB
INR PPP: 1.12 (ref 0.84–1.19)
PROTHROMBIN TIME: 14 SECONDS (ref 11.6–14.5)

## 2021-09-30 PROCEDURE — 36832 AV FISTULA REVISION OPEN: CPT | Performed by: SURGERY

## 2021-09-30 PROCEDURE — 05SB0ZZ REPOSITION RIGHT BASILIC VEIN, OPEN APPROACH: ICD-10-PCS | Performed by: SURGERY

## 2021-09-30 PROCEDURE — 03S70ZZ REPOSITION RIGHT BRACHIAL ARTERY, OPEN APPROACH: ICD-10-PCS | Performed by: SURGERY

## 2021-09-30 PROCEDURE — 85610 PROTHROMBIN TIME: CPT | Performed by: NURSE PRACTITIONER

## 2021-09-30 RX ORDER — ACETAMINOPHEN 325 MG/1
650 TABLET ORAL EVERY 6 HOURS PRN
Status: DISCONTINUED | OUTPATIENT
Start: 2021-09-30 | End: 2021-10-05 | Stop reason: HOSPADM

## 2021-09-30 RX ORDER — LEVOTHYROXINE SODIUM 0.07 MG/1
75 TABLET ORAL
Status: DISCONTINUED | OUTPATIENT
Start: 2021-10-01 | End: 2021-10-05 | Stop reason: HOSPADM

## 2021-09-30 RX ORDER — CEFAZOLIN SODIUM 2 G/50ML
2000 SOLUTION INTRAVENOUS ONCE
Status: DISCONTINUED | OUTPATIENT
Start: 2021-09-30 | End: 2021-09-30 | Stop reason: HOSPADM

## 2021-09-30 RX ORDER — LIDOCAINE HYDROCHLORIDE 10 MG/ML
0.5 INJECTION, SOLUTION EPIDURAL; INFILTRATION; INTRACAUDAL; PERINEURAL ONCE
Status: COMPLETED | OUTPATIENT
Start: 2021-09-30 | End: 2021-09-30

## 2021-09-30 RX ORDER — ATORVASTATIN CALCIUM 40 MG/1
40 TABLET, FILM COATED ORAL
Status: DISCONTINUED | OUTPATIENT
Start: 2021-09-30 | End: 2021-10-05 | Stop reason: HOSPADM

## 2021-09-30 RX ORDER — CEFAZOLIN SODIUM 2 G/50ML
SOLUTION INTRAVENOUS AS NEEDED
Status: DISCONTINUED | OUTPATIENT
Start: 2021-09-30 | End: 2021-09-30

## 2021-09-30 RX ORDER — HEPARIN SODIUM 1000 [USP'U]/ML
INJECTION, SOLUTION INTRAVENOUS; SUBCUTANEOUS AS NEEDED
Status: DISCONTINUED | OUTPATIENT
Start: 2021-09-30 | End: 2021-09-30

## 2021-09-30 RX ORDER — ONDANSETRON 2 MG/ML
INJECTION INTRAMUSCULAR; INTRAVENOUS AS NEEDED
Status: DISCONTINUED | OUTPATIENT
Start: 2021-09-30 | End: 2021-09-30

## 2021-09-30 RX ORDER — LIDOCAINE HYDROCHLORIDE 10 MG/ML
INJECTION, SOLUTION EPIDURAL; INFILTRATION; INTRACAUDAL; PERINEURAL AS NEEDED
Status: DISCONTINUED | OUTPATIENT
Start: 2021-09-30 | End: 2021-09-30

## 2021-09-30 RX ORDER — OXYCODONE HYDROCHLORIDE 5 MG/1
2.5 TABLET ORAL EVERY 4 HOURS PRN
Status: DISCONTINUED | OUTPATIENT
Start: 2021-09-30 | End: 2021-10-05 | Stop reason: HOSPADM

## 2021-09-30 RX ORDER — FENTANYL CITRATE 50 UG/ML
INJECTION, SOLUTION INTRAMUSCULAR; INTRAVENOUS AS NEEDED
Status: DISCONTINUED | OUTPATIENT
Start: 2021-09-30 | End: 2021-09-30

## 2021-09-30 RX ORDER — GLYCOPYRROLATE 0.2 MG/ML
INJECTION INTRAMUSCULAR; INTRAVENOUS AS NEEDED
Status: DISCONTINUED | OUTPATIENT
Start: 2021-09-30 | End: 2021-09-30

## 2021-09-30 RX ORDER — CITALOPRAM 20 MG/1
20 TABLET ORAL DAILY
Status: DISCONTINUED | OUTPATIENT
Start: 2021-10-01 | End: 2021-10-05 | Stop reason: HOSPADM

## 2021-09-30 RX ORDER — LANOLIN ALCOHOL/MO/W.PET/CERES
3 CREAM (GRAM) TOPICAL
Status: DISCONTINUED | OUTPATIENT
Start: 2021-09-30 | End: 2021-10-05 | Stop reason: HOSPADM

## 2021-09-30 RX ORDER — SODIUM CHLORIDE 9 MG/ML
INJECTION, SOLUTION INTRAVENOUS CONTINUOUS PRN
Status: DISCONTINUED | OUTPATIENT
Start: 2021-09-30 | End: 2021-09-30

## 2021-09-30 RX ORDER — ONDANSETRON 2 MG/ML
4 INJECTION INTRAMUSCULAR; INTRAVENOUS ONCE AS NEEDED
Status: DISCONTINUED | OUTPATIENT
Start: 2021-09-30 | End: 2021-09-30 | Stop reason: HOSPADM

## 2021-09-30 RX ORDER — CALCITRIOL 0.25 UG/1
0.25 CAPSULE, LIQUID FILLED ORAL DAILY
Status: DISCONTINUED | OUTPATIENT
Start: 2021-10-01 | End: 2021-10-05 | Stop reason: HOSPADM

## 2021-09-30 RX ORDER — OXYCODONE HYDROCHLORIDE 5 MG/1
5 TABLET ORAL EVERY 4 HOURS PRN
Status: DISCONTINUED | OUTPATIENT
Start: 2021-09-30 | End: 2021-10-05 | Stop reason: HOSPADM

## 2021-09-30 RX ORDER — FENTANYL CITRATE/PF 50 MCG/ML
25 SYRINGE (ML) INJECTION
Status: COMPLETED | OUTPATIENT
Start: 2021-09-30 | End: 2021-09-30

## 2021-09-30 RX ORDER — SODIUM CHLORIDE, SODIUM LACTATE, POTASSIUM CHLORIDE, CALCIUM CHLORIDE 600; 310; 30; 20 MG/100ML; MG/100ML; MG/100ML; MG/100ML
125 INJECTION, SOLUTION INTRAVENOUS CONTINUOUS
Status: DISCONTINUED | OUTPATIENT
Start: 2021-09-30 | End: 2021-10-01

## 2021-09-30 RX ORDER — MAGNESIUM HYDROXIDE 1200 MG/15ML
LIQUID ORAL AS NEEDED
Status: DISCONTINUED | OUTPATIENT
Start: 2021-09-30 | End: 2021-09-30 | Stop reason: HOSPADM

## 2021-09-30 RX ORDER — PROPOFOL 10 MG/ML
INJECTION, EMULSION INTRAVENOUS AS NEEDED
Status: DISCONTINUED | OUTPATIENT
Start: 2021-09-30 | End: 2021-09-30

## 2021-09-30 RX ADMIN — GLYCOPYRROLATE 0.1 MG: 0.2 INJECTION, SOLUTION INTRAMUSCULAR; INTRAVENOUS at 17:03

## 2021-09-30 RX ADMIN — PHENYLEPHRINE HYDROCHLORIDE 30 MCG/MIN: 10 INJECTION INTRAVENOUS at 15:20

## 2021-09-30 RX ADMIN — SODIUM CHLORIDE: 0.9 INJECTION, SOLUTION INTRAVENOUS at 15:33

## 2021-09-30 RX ADMIN — FENTANYL CITRATE 25 MCG: 50 INJECTION INTRAMUSCULAR; INTRAVENOUS at 20:41

## 2021-09-30 RX ADMIN — FENTANYL CITRATE 25 MCG: 50 INJECTION INTRAMUSCULAR; INTRAVENOUS at 21:04

## 2021-09-30 RX ADMIN — PROPOFOL 20 MG: 10 INJECTION, EMULSION INTRAVENOUS at 15:18

## 2021-09-30 RX ADMIN — FENTANYL CITRATE 25 MCG: 50 INJECTION INTRAMUSCULAR; INTRAVENOUS at 16:26

## 2021-09-30 RX ADMIN — SODIUM CHLORIDE, SODIUM LACTATE, POTASSIUM CHLORIDE, AND CALCIUM CHLORIDE 125 ML/HR: .6; .31; .03; .02 INJECTION, SOLUTION INTRAVENOUS at 12:15

## 2021-09-30 RX ADMIN — LIDOCAINE HYDROCHLORIDE 0.2 ML: 10 INJECTION, SOLUTION EPIDURAL; INFILTRATION; INTRACAUDAL; PERINEURAL at 12:15

## 2021-09-30 RX ADMIN — FENTANYL CITRATE 25 MCG: 50 INJECTION INTRAMUSCULAR; INTRAVENOUS at 15:53

## 2021-09-30 RX ADMIN — HEPARIN SODIUM 3000 UNITS: 1000 INJECTION INTRAVENOUS; SUBCUTANEOUS at 17:40

## 2021-09-30 RX ADMIN — PROPOFOL 30 MG: 10 INJECTION, EMULSION INTRAVENOUS at 15:19

## 2021-09-30 RX ADMIN — LIDOCAINE HYDROCHLORIDE 50 MG: 10 INJECTION, SOLUTION EPIDURAL; INFILTRATION; INTRACAUDAL; PERINEURAL at 15:17

## 2021-09-30 RX ADMIN — CEFAZOLIN SODIUM 2000 MG: 2 SOLUTION INTRAVENOUS at 15:45

## 2021-09-30 RX ADMIN — ATORVASTATIN CALCIUM 40 MG: 40 TABLET, FILM COATED ORAL at 21:48

## 2021-09-30 RX ADMIN — FENTANYL CITRATE 25 MCG: 50 INJECTION INTRAMUSCULAR; INTRAVENOUS at 15:59

## 2021-09-30 RX ADMIN — OXYCODONE HYDROCHLORIDE 5 MG: 5 TABLET ORAL at 23:37

## 2021-09-30 RX ADMIN — FENTANYL CITRATE 25 MCG: 50 INJECTION INTRAMUSCULAR; INTRAVENOUS at 19:58

## 2021-09-30 RX ADMIN — FENTANYL CITRATE 50 MCG: 50 INJECTION INTRAMUSCULAR; INTRAVENOUS at 15:19

## 2021-09-30 RX ADMIN — METOPROLOL TARTRATE 25 MG: 25 TABLET, FILM COATED ORAL at 21:47

## 2021-09-30 RX ADMIN — ONDANSETRON 4 MG: 2 INJECTION INTRAMUSCULAR; INTRAVENOUS at 18:56

## 2021-09-30 RX ADMIN — FENTANYL CITRATE 50 MCG: 50 INJECTION INTRAMUSCULAR; INTRAVENOUS at 17:40

## 2021-09-30 RX ADMIN — FENTANYL CITRATE 25 MCG: 50 INJECTION INTRAMUSCULAR; INTRAVENOUS at 16:41

## 2021-09-30 RX ADMIN — FENTANYL CITRATE 25 MCG: 50 INJECTION INTRAMUSCULAR; INTRAVENOUS at 20:18

## 2021-09-30 RX ADMIN — MELATONIN TAB 3 MG 3 MG: 3 TAB at 21:47

## 2021-09-30 RX ADMIN — PROPOFOL 120 MG: 10 INJECTION, EMULSION INTRAVENOUS at 15:16

## 2021-09-30 NOTE — ANESTHESIA POSTPROCEDURE EVALUATION
Post-Op Assessment Note    CV Status:  Stable  Pain Score: 0    Pain management: adequate     Mental Status:  Awake and sleepy   Hydration Status:  Euvolemic   PONV Controlled:  Controlled   Airway Patency:  Patent   Two or more mitigation strategies used for obstructive sleep apnea   Post Op Vitals Reviewed: Yes      Staff: CRNA, Anesthesiologist         No complications documented      /65 (09/30/21 1944)    Temp (!) 97 1 °F (36 2 °C) (09/30/21 1944)    Pulse 62 (09/30/21 1944)   Resp 15 (09/30/21 1944)    SpO2 100 % (09/30/21 1944)

## 2021-09-30 NOTE — PROGRESS NOTES
Was asked to evaluate Wilfrido Davila while she was in the hospital for a surgical procedure  Patient evaluated in holding area  States she has been having wine colored urine in her ostomy bag since left pcn/retrograde pcnu placement 9/20/21  She denies any pain  Has mild discomfort to her left pcn site when she lays on the padding  Urine currently wine colored with small amount of debridement  No blood clots  Believe this may be due to irritation from left pcn  Patient was scheduled with IR for left pcn removal on 10/11/21  Called IR scheduling and will move up left pcn removal/left pcnu check to Tuesday, October 5th  IR scheduling will reach out to patient tomorrow to discuss appointment       Preeti Anthony PA-C

## 2021-09-30 NOTE — INTERVAL H&P NOTE
H&P reviewed  After examining the patient I find no changes in the patients condition since the H&P had been written      Vitals:    09/30/21 1124   BP: 138/78   Pulse: 71   Resp: 18   Temp: 100 °F (37 8 °C)   SpO2: 95%

## 2021-10-01 ENCOUNTER — PATIENT OUTREACH (OUTPATIENT)
Dept: CASE MANAGEMENT | Facility: HOSPITAL | Age: 76
End: 2021-10-01

## 2021-10-01 LAB
ABO GROUP BLD: NORMAL
ANION GAP SERPL CALCULATED.3IONS-SCNC: 5 MMOL/L (ref 4–13)
BLD GP AB SCN SERPL QL: NEGATIVE
BUN SERPL-MCNC: 28 MG/DL (ref 5–25)
CALCIUM SERPL-MCNC: 8.3 MG/DL (ref 8.3–10.1)
CHLORIDE SERPL-SCNC: 112 MMOL/L (ref 100–108)
CO2 SERPL-SCNC: 21 MMOL/L (ref 21–32)
CREAT SERPL-MCNC: 3.11 MG/DL (ref 0.6–1.3)
ERYTHROCYTE [DISTWIDTH] IN BLOOD BY AUTOMATED COUNT: 15.8 % (ref 11.6–15.1)
GFR SERPL CREATININE-BSD FRML MDRD: 14 ML/MIN/1.73SQ M
GLUCOSE SERPL-MCNC: 143 MG/DL (ref 65–140)
HCT VFR BLD AUTO: 23.6 % (ref 34.8–46.1)
HGB BLD-MCNC: 7 G/DL (ref 11.5–15.4)
MCH RBC QN AUTO: 30.4 PG (ref 26.8–34.3)
MCHC RBC AUTO-ENTMCNC: 29.7 G/DL (ref 31.4–37.4)
MCV RBC AUTO: 103 FL (ref 82–98)
PLATELET # BLD AUTO: 256 THOUSANDS/UL (ref 149–390)
PMV BLD AUTO: 9.8 FL (ref 8.9–12.7)
POTASSIUM SERPL-SCNC: 4.2 MMOL/L (ref 3.5–5.3)
RBC # BLD AUTO: 2.3 MILLION/UL (ref 3.81–5.12)
RH BLD: POSITIVE
SODIUM SERPL-SCNC: 138 MMOL/L (ref 136–145)
SPECIMEN EXPIRATION DATE: NORMAL
WBC # BLD AUTO: 6.12 THOUSAND/UL (ref 4.31–10.16)

## 2021-10-01 PROCEDURE — 86900 BLOOD TYPING SEROLOGIC ABO: CPT | Performed by: SURGERY

## 2021-10-01 PROCEDURE — 30233N1 TRANSFUSION OF NONAUTOLOGOUS RED BLOOD CELLS INTO PERIPHERAL VEIN, PERCUTANEOUS APPROACH: ICD-10-PCS | Performed by: SURGERY

## 2021-10-01 PROCEDURE — NC001 PR NO CHARGE: Performed by: SURGERY

## 2021-10-01 PROCEDURE — 86901 BLOOD TYPING SEROLOGIC RH(D): CPT | Performed by: SURGERY

## 2021-10-01 PROCEDURE — 86923 COMPATIBILITY TEST ELECTRIC: CPT

## 2021-10-01 PROCEDURE — 85027 COMPLETE CBC AUTOMATED: CPT | Performed by: PHYSICIAN ASSISTANT

## 2021-10-01 PROCEDURE — 86850 RBC ANTIBODY SCREEN: CPT | Performed by: SURGERY

## 2021-10-01 PROCEDURE — P9016 RBC LEUKOCYTES REDUCED: HCPCS

## 2021-10-01 PROCEDURE — 80048 BASIC METABOLIC PNL TOTAL CA: CPT | Performed by: PHYSICIAN ASSISTANT

## 2021-10-01 RX ORDER — HEPARIN SODIUM 5000 [USP'U]/ML
5000 INJECTION, SOLUTION INTRAVENOUS; SUBCUTANEOUS EVERY 8 HOURS SCHEDULED
Status: DISCONTINUED | OUTPATIENT
Start: 2021-10-01 | End: 2021-10-05 | Stop reason: HOSPADM

## 2021-10-01 RX ADMIN — HEPARIN SODIUM 5000 UNITS: 5000 INJECTION INTRAVENOUS; SUBCUTANEOUS at 21:38

## 2021-10-01 RX ADMIN — LEVOTHYROXINE SODIUM 75 MCG: 75 TABLET ORAL at 05:02

## 2021-10-01 RX ADMIN — OXYCODONE HYDROCHLORIDE 2.5 MG: 5 TABLET ORAL at 09:08

## 2021-10-01 RX ADMIN — ATORVASTATIN CALCIUM 40 MG: 40 TABLET, FILM COATED ORAL at 21:38

## 2021-10-01 RX ADMIN — METOPROLOL TARTRATE 25 MG: 25 TABLET, FILM COATED ORAL at 09:07

## 2021-10-01 RX ADMIN — CITALOPRAM HYDROBROMIDE 20 MG: 20 TABLET ORAL at 12:02

## 2021-10-01 RX ADMIN — ACETAMINOPHEN 650 MG: 325 TABLET, FILM COATED ORAL at 16:20

## 2021-10-01 RX ADMIN — RUXOLITINIB 10 MG: 10 TABLET ORAL at 14:08

## 2021-10-01 RX ADMIN — MELATONIN TAB 3 MG 3 MG: 3 TAB at 21:38

## 2021-10-01 RX ADMIN — HEPARIN SODIUM 5000 UNITS: 5000 INJECTION INTRAVENOUS; SUBCUTANEOUS at 15:11

## 2021-10-01 RX ADMIN — SODIUM CHLORIDE, SODIUM LACTATE, POTASSIUM CHLORIDE, AND CALCIUM CHLORIDE 125 ML/HR: .6; .31; .03; .02 INJECTION, SOLUTION INTRAVENOUS at 02:42

## 2021-10-01 RX ADMIN — CALCITRIOL CAPSULES 0.25 MCG 0.25 MCG: 0.25 CAPSULE ORAL at 09:07

## 2021-10-01 NOTE — PROGRESS NOTES
Progress Note - Vascular Surgery   Denver Ledy 77 y o  female MRN: [id number]  Unit/Bed#: [facility] 322-01 Encounter: [id number]    Assessment:  POD1 Brachiobasilic fistula superficialization and transposition    Incision looks ok with some ecchymosis   Good thrill on fistula    Plan:   Diet   Ambulation   DC home today    Subjective/Objective     Subjective:   Complains pain but controlled    Objective:    Blood pressure 135/70, pulse 65, temperature 98 °F (36 7 °C), temperature source Oral, resp  rate 16, SpO2 97 %, not currently breastfeeding  ,There is no height or weight on file to calculate BMI        Intake/Output Summary (Last 24 hours) at 10/1/2021 0734  Last data filed at 10/1/2021 0501  Gross per 24 hour   Intake 1573 83 ml   Output 650 ml   Net 923 83 ml       Invasive Devices     Peripheral Intravenous Line            Peripheral IV 09/30/21 Left Arm <1 day          Line            Hemodialysis AV Fistula Right Upper arm -- days    Hemodialysis AV Fistula 09/30/21 1 day          Drain            Percutaneous Nephroureteral Tube (PCNU) -- days    Urostomy Ileal conduit RUQ 1822 days    Percutaneous Nephroureteral Tube (PCNU) Left 1 10 Fr 40 Cm 14 days    Nephrostomy Left 2 8 Fr  10 days                Physical Exam:   Gen:  NAD  CV:  warm, well-perfused  Lungs: nl effort  Abd:  soft, NT/ND  Ext:  Ecchymosis around incision, good thrill on fistula  Neuro: A&Ox3     Results from last 7 days   Lab Units 09/29/21  1233   WBC Thousand/uL 4 87   HEMOGLOBIN g/dL 7 6*   HEMATOCRIT % 24 7*   PLATELETS Thousands/uL 266           Invalid input(s): LABGLOM  Results from last 7 days   Lab Units 09/30/21  1206   INR  1 12

## 2021-10-01 NOTE — ASSESSMENT & PLAN NOTE
78 y/o F admitted for elective HD access related surgery    S/P Brachiobasilic fistula superficialization and transposition 9/30    Plan:  [facility]

## 2021-10-01 NOTE — UTILIZATION REVIEW
Initial Clinical Review    OP ELECTIVE SURGERY 9/30 UPGRADED TO INPATIENT 10/1 @ [facility] POST-OP    Admission: Date/Time/Statement:   Admission Orders (From admission, onward)     Ordered        10/01/21 1605  Inpatient Admission  Once                   Orders Placed This Encounter   Procedures    Inpatient Admission     Standing Status:   Standing     Number of Occurrences:   1     Order Specific Question:   Level of Care     Answer:   Med Surg [16]     Order Specific Question:   Estimated length of stay     Answer:   More than 2 Midnights     Order Specific Question:   Certification     Answer:   I certify that inpatient services are medically necessary for this patient for a duration of greater than two midnights  See H&P and MD Progress Notes for additional information about the patient's course of treatment  OPERATIVE REPORT   SURGERY DATE: 9/30/2021  Procedure(s) (LRB):  Superficialization and transposition of right brachiobasilic fistula   Anesthesia Type:   General ETA   Operative Indications:  Patient is a 78 yo F w/ progressing CKD, s/p creation R brachiobasilic fistula, well matured, presents now for superficialization and transposition  Patient had several admissions since fistula creation in Jan that prevented sooner transposition    Operative Findings:  Very poor subcutaneous tissue quality   Large, well matured brachiobasilic fistula with several large branches; there is a relatively stenotic are about 2 cm from the anastomosis with vein wall thickening    10/1 -- POD #1 -- pt c /o pain but controlled on current pain regiment  Reg diet as seble  OOB/ambulate  SCD's  Analgesics prn      Additional Vital Signs:   Date/Time  Temp  Pulse  Resp  BP  MAP (mmHg)  SpO2  O2 Flow Rate (L/min)  O2 Device   10/01/21 1618  101 4 °F (38 6 °C)Abnormal   82  16  101/50  72  --  --  --   10/01/21 1530  101 3 °F (38 5 °C)Abnormal   86  18  104/52  72  96 %  --  None (Room air)   10/01/21 0700  98 1 °F (36 7 °C)  84  16  125/64  89  93 %  --  --   09/30/21 2337  98 °F (36 7 °C)  65  16  135/70  --  97 %  --  Nasal cannula   09/30/21 2139  98 °F (36 7 °C)  65  16  141/73  112  98 %  3 L/min  Nasal cannula   09/30/21 2100  --  66  30Abnormal   133/61  --  100 %  3 L/min  Nasal cannula   09/30/21 2000  --  62  27Abnormal   --  --  99 %  3 L/min  Nasal cannula   09/30/21 1944  97 1 °F (36 2 °C)Abnormal   62  15  127/65  --  100 %  8 L/min  Simple mask   09/30/21 1124  100 °F (37 8 °C)  71  18  138/78  --  95 %  --  None (Room air)       Pertinent Labs/Diagnostic Test Results:     Results from last 7 days   Lab Units 10/01/21  1307 09/29/21  1233   WBC Thousand/uL 6 12 4 87   HEMOGLOBIN g/dL 7 0* 7 6*   HEMATOCRIT % 23 6* 24 7*   PLATELETS Thousands/uL 256 266   NEUTROS ABS Thousands/µL  --  3 48     Results from last 7 days   Lab Units 10/01/21  1307   SODIUM mmol/L 138   POTASSIUM mmol/L 4 2   CHLORIDE mmol/L 112*   CO2 mmol/L 21   ANION GAP mmol/L 5   BUN mg/dL 28*   CREATININE mg/dL 3 11*   EGFR ml/min/1 73sq m 14   CALCIUM mg/dL 8 3     Results from last 7 days   Lab Units 10/01/21  1307   GLUCOSE RANDOM mg/dL 143*     Results from last 7 days   Lab Units 09/30/21  1206   PROTIME seconds 14 0   INR  1 12       Past Medical History:   Diagnosis Date    Anxiety     Bowel obstruction (HCC)     Chronic kidney disease     Chronic kidney disease (CKD), stage IV (severe) (HCC)     stage IV    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    Circulation problem     Compression fracture of cervical spine ([facility] [address] )     COVID-19 07/2021    hospitalized     Hernia of abdominal cavity     History of kidney problems     History of transfusion     Hydronephrosis     Hypertension     IBS (irritable bowel syndrome)     Incontinence     Lung mass     Improving on PET/CT 1/2016    Polycythemia vera ([facility] [address] )     Pulmonary embolism ([facility] [address] ) 2014    Shingles     Urinary tract infection      Present on Admission:   Stage 5 chronic kidney disease not on chronic dialysis [facility])      Admitting Diagnosis: Stage 5 chronic kidney disease not on chronic dialysis ([facility] [address] ) [N18 5]  Age/Sex: 77 y o  female  Admission Orders:  Scheduled Medications:  atorvastatin, 40 mg, Oral, HS  calcitriol, 0 25 mcg, Oral, Daily  citalopram, 20 mg, Oral, Daily  heparin (porcine), 5,000 Units, Subcutaneous, Q8H LONG  levothyroxine, 75 mcg, Oral, Early Morning  melatonin, 3 mg, Oral, HS  metoprolol tartrate, 25 mg, Oral, BID  ruxolitinib, 10 mg, Oral, Daily    PRN Meds:  acetaminophen, 650 mg, Oral, Q6H PRN 10/1 x1  oxyCODONE, 2 5 mg, Oral, Q4H PRN 10/1 x1  oxyCODONE, 5 mg, Oral, Q4H PRN 9/30 x1        Network Utilization Review Department  ATTENTION: Please call with any questions or concerns to [phone] and carefully listen to the prompts so that you are directed to the right person  All voicemails are confidential   Lynette Kussmaul all requests for admission clinical reviews, approved or denied determinations and any other requests to dedicated fax number below belonging to the campus where the patient is receiving treatment   List of dedicated fax numbers for the Facilities:  [address] DENIALS (Administrative/Medical Necessity) [phone]   [address] (Maternity/NICU/Pediatrics) [phone]   [address]  [address] [address] [place] [address] [address] [facility] [facility] [phone]

## 2021-10-01 NOTE — OP NOTE
OPERATIVE REPORT  PATIENT NAME: Domingo Guthrie    :  [id number]  MRN: [id number]  Pt Location: BE OR ROOM 06    SURGERY DATE: 2021    Surgeon(s) and Role:     Prosper Chairez MD - Primary     * Aletha Lopez MD - Assisting    Preop Diagnosis:  Stage 5 chronic kidney disease not on chronic dialysis ([address] ) [N18 5]    Post-Op Diagnosis Codes:     * Stage 5 chronic kidney disease not on chronic dialysis ([address] ) [N18 5]    Procedure(s) (LRB):  Superficialization and transposition of right brachiobasilic fistula    Specimen(s):  none    Estimated Blood Loss:   100cc    Drains:  Nephrostomy Left 2 8 Fr  (Active)   Number of days: 11       Urostomy Ileal conduit RUQ (Active)   Stomal Appliance 2 piece 10/01/21 0501   Output (mL) 300 mL 10/01/21 0501   Number of days: 1823       Percutaneous Nephroureteral Tube (PCNU) Left 1 10 Fr 40 Cm (Active)   Number of days: 15       Percutaneous Nephroureteral Tube (PCNU) (Active)   Number of days:        Anesthesia Type:   General ETA    Operative Indications:  Patient is a 76 yo F w/ progressing CKD, s/p creation R brachiobasilic fistula, well matured, presents now for superficialization and transposition  Patient had several admissions since fistula creation in  that prevented sooner transposition  Operative Findings:  Very poor subcutaneous tissue quality   Large, well matured brachiobasilic fistula with several large branches; there is a relatively stenotic are about 2 cm from the anastomosis with vein wall thickening    Complications:   None    Procedure and Technique:  After informed consent was obtained, the patient was brought to the operating room and placed in the supine position  She was given anesthesia and endotracheally intubated  She was given IV antibiotics with ancef  Duplex ultrasound was used to examine the basilic vein along its length, marking all branches    She was prepped and draped in the usual sterile fashion, exposing the right arm circumferentially  A timeout was performed  A longitudinal incision was made at the distal upper arm  Cautery was used to dissect through the soft tissue  The basilic vein was identified and freed circumferentially  The dissection was continued proximally and distally until the entire length of the basilic vein was exposed and freed  Several very large branches were ligated  The subcutaneous tissue was noted to be highly friable and of poor quality  It bled even with minimal manipulation  The vein appeared large in size with an area of relative stenosis about 2cm central to the anastomosis which was about 2cm long  The vein was also noted to be redundant  Because of the poor tissue quality, I was concerned that creating a skin flap would be high risk for nonhealing and the decision was made to redo the anastomosis instead  A curved Misty-wicornel  was used to create a superficial tunnel over the anterior arm  3000 units of IV heparin were given  The vein was clamped centrally in the axilla and also just central to the anastomosis  The anterior surface of the vein was marked  The vein was transected in the stenotic portion  It was then passed through the tunnel, making sure to avoid any kinks or twists  Because of the redundancy, the decision was made to make a vein to vein anastomosis  The vein was spatulated on both sides and an end to end anastomosis was created with 6-0 prolene, run circumferentially  Prior to completion, the fistula and vein were backbled, bled, and flushed with heparinized saline  Flow was restored  Although the fistula continued to have an excellent thrill, the anastomosis appeared somewhat redundant and I was concerned that the fistula woult become kinked when the soft tissue was closed  The decision was made to reanastomose the vein directly to the brachial artery in order to remove the stenotic redundant portion of vein    The brachial artery was dissected free just proximal to the previous anastomosis site  The vein was again clamped and transected and The vein stump near the anastomosis was doubly ligated with 2-0 silk ties  The artery was clamped with profunda clamps and a longitudinal arteriotomy was made with an 11-blade  It was lengthened with Barksdale scissors  The anastomosis was created with 6-0 prolene suture, tied a the heel and run circumferentially  Prior to completion, the fistula and vein were bled, backbled, and flushed with heparinized saline  Flow was restored, first up the fistula and then down the arm  The fistula was easily palpated along its tract with an excellent thrill  There were no kinks along its path  The wound bed was then copiously irrigated with saline  Cautery was used to help with hemostasis  Once the wound was hemostatic, the wound was closed in multiple layers  2-0 vicryl running suture was used to close the deep fascia layer  2-0 vicryl running suture was used to close the soft tissue over the fistula, a running 3-0 vicryl was used to close the subcutaneous layer and inturrupted nylons were used for the skin  The wound was dressed with mepilex and wrapped with an ACE wrap  The patient was allowed to awaken and was extubated  She was transferred to the PACU for postoperative care  I was present for the entire procedure    Patient Disposition:  PACU     SIGNATURE: Aleksandar Chairez MD  DATE: October 1, 2021  TIME: 10:18 AM    Vascular Quality Initiative - Hemodialysis Access Placement    Pre-admission Information   Functional status: Ambulatory and capable of all self care but unable to carry out any work activities  Up and about more than 50% of waking hours  ESRD: ESRD: Pre Dialysis    Historical Information      Previous Access: none    Access Type/Location: ACCESS TYPE: Surgical AVF  Access Location: Upper arm Basilic           Procedure Information      Status: Outpatient     Side:right      Anesthesia: General     Access Type: Access Type: Surgical AVF Inflow Artery is: Brachial, Antecubital Intraoperative Artery taget diameter is: 6mm  Outflow Vein is: Basilic, Upper Arm  Intraoperative Vein target diameter is: 10mm  Anastomosis configuration is: End to Side    Cocomitant Procedure performed-: Superficialization    Completion Fistulogram: no     Preop ARTERIAL evaluation and/or treatment: duplex and arteriogram    Preop VENOUS evaluation and/or treatment: venogram and venous intervention    *Obtain Target Diameters from study          Post op Information     Discharge Status: Admitted overnight then home    Post op Complications: None

## 2021-10-02 LAB
ABO GROUP BLD BPU: NORMAL
ANION GAP SERPL CALCULATED.3IONS-SCNC: 8 MMOL/L (ref 4–13)
BPU ID: NORMAL
BUN SERPL-MCNC: 31 MG/DL (ref 5–25)
CALCIUM SERPL-MCNC: 8.1 MG/DL (ref 8.3–10.1)
CHLORIDE SERPL-SCNC: 113 MMOL/L (ref 100–108)
CO2 SERPL-SCNC: 18 MMOL/L (ref 21–32)
CREAT SERPL-MCNC: 3.26 MG/DL (ref 0.6–1.3)
CROSSMATCH: NORMAL
ERYTHROCYTE [DISTWIDTH] IN BLOOD BY AUTOMATED COUNT: 19.8 % (ref 11.6–15.1)
FERRITIN SERPL-MCNC: 552 NG/ML (ref 8–388)
GFR SERPL CREATININE-BSD FRML MDRD: 13 ML/MIN/1.73SQ M
GLUCOSE SERPL-MCNC: 99 MG/DL (ref 65–140)
HCT VFR BLD AUTO: 21.9 % (ref 34.8–46.1)
HGB BLD-MCNC: 6.7 G/DL (ref 11.5–15.4)
IRON SATN MFR SERPL: 38 % (ref 15–50)
IRON SERPL-MCNC: 187 UG/DL (ref 50–170)
MAGNESIUM SERPL-MCNC: 1.9 MG/DL (ref 1.6–2.6)
MCH RBC QN AUTO: 28.6 PG (ref 26.8–34.3)
MCHC RBC AUTO-ENTMCNC: 30.6 G/DL (ref 31.4–37.4)
MCV RBC AUTO: 94 FL (ref 82–98)
PHOSPHATE SERPL-MCNC: 3.4 MG/DL (ref 2.3–4.1)
PLATELET # BLD AUTO: 200 THOUSANDS/UL (ref 149–390)
PMV BLD AUTO: 9.9 FL (ref 8.9–12.7)
POTASSIUM SERPL-SCNC: 3.9 MMOL/L (ref 3.5–5.3)
RBC # BLD AUTO: 2.34 MILLION/UL (ref 3.81–5.12)
SODIUM SERPL-SCNC: 139 MMOL/L (ref 136–145)
TIBC SERPL-MCNC: 487 UG/DL (ref 250–450)
UNIT DISPENSE STATUS: NORMAL
UNIT PRODUCT CODE: NORMAL
UNIT PRODUCT VOLUME: 350 ML
UNIT RH: NORMAL
WBC # BLD AUTO: 4.56 THOUSAND/UL (ref 4.31–10.16)

## 2021-10-02 PROCEDURE — 80048 BASIC METABOLIC PNL TOTAL CA: CPT | Performed by: SURGERY

## 2021-10-02 PROCEDURE — P9016 RBC LEUKOCYTES REDUCED: HCPCS

## 2021-10-02 PROCEDURE — 84100 ASSAY OF PHOSPHORUS: CPT | Performed by: SURGERY

## 2021-10-02 PROCEDURE — 99223 1ST HOSP IP/OBS HIGH 75: CPT | Performed by: INTERNAL MEDICINE

## 2021-10-02 PROCEDURE — 85027 COMPLETE CBC AUTOMATED: CPT | Performed by: SURGERY

## 2021-10-02 PROCEDURE — 82728 ASSAY OF FERRITIN: CPT | Performed by: FAMILY MEDICINE

## 2021-10-02 PROCEDURE — 30233N1 TRANSFUSION OF NONAUTOLOGOUS RED BLOOD CELLS INTO PERIPHERAL VEIN, PERCUTANEOUS APPROACH: ICD-10-PCS | Performed by: SURGERY

## 2021-10-02 PROCEDURE — 83540 ASSAY OF IRON: CPT | Performed by: FAMILY MEDICINE

## 2021-10-02 PROCEDURE — 83735 ASSAY OF MAGNESIUM: CPT | Performed by: SURGERY

## 2021-10-02 PROCEDURE — 99024 POSTOP FOLLOW-UP VISIT: CPT | Performed by: SURGERY

## 2021-10-02 PROCEDURE — 83550 IRON BINDING TEST: CPT | Performed by: FAMILY MEDICINE

## 2021-10-02 PROCEDURE — 99253 IP/OBS CNSLTJ NEW/EST LOW 45: CPT | Performed by: FAMILY MEDICINE

## 2021-10-02 RX ORDER — SODIUM BICARBONATE 650 MG/1
650 TABLET ORAL
Status: DISCONTINUED | OUTPATIENT
Start: 2021-10-02 | End: 2021-10-05 | Stop reason: HOSPADM

## 2021-10-02 RX ADMIN — ACETAMINOPHEN 650 MG: 325 TABLET, FILM COATED ORAL at 05:47

## 2021-10-02 RX ADMIN — LEVOTHYROXINE SODIUM 75 MCG: 75 TABLET ORAL at 05:47

## 2021-10-02 RX ADMIN — RUXOLITINIB 10 MG: 10 TABLET ORAL at 08:47

## 2021-10-02 RX ADMIN — CITALOPRAM HYDROBROMIDE 20 MG: 20 TABLET ORAL at 08:47

## 2021-10-02 RX ADMIN — METOPROLOL TARTRATE 25 MG: 25 TABLET, FILM COATED ORAL at 17:14

## 2021-10-02 RX ADMIN — METOPROLOL TARTRATE 25 MG: 25 TABLET, FILM COATED ORAL at 08:46

## 2021-10-02 RX ADMIN — ATORVASTATIN CALCIUM 40 MG: 40 TABLET, FILM COATED ORAL at 21:25

## 2021-10-02 RX ADMIN — HEPARIN SODIUM 5000 UNITS: 5000 INJECTION INTRAVENOUS; SUBCUTANEOUS at 05:47

## 2021-10-02 RX ADMIN — MELATONIN TAB 3 MG 3 MG: 3 TAB at 21:26

## 2021-10-02 RX ADMIN — SODIUM BICARBONATE 650 MG TABLET 650 MG: at 17:14

## 2021-10-02 RX ADMIN — CALCITRIOL CAPSULES 0.25 MCG 0.25 MCG: 0.25 CAPSULE ORAL at 08:46

## 2021-10-02 NOTE — ASSESSMENT & PLAN NOTE
· Chronic bilateral hydronephrosis with stent placement  · underwent conversion to retrograde nephroureterostomy tube  · Tea colored urine in ostomy bag since left PCN placement 9/20/21  · IR consulted for left pcn removal, now scheduled for 10/5  · In 10/2020 Hydronephrosis and hematuria 5/2021  S/P Nephrostomy tube placement 5/11/21

## 2021-10-02 NOTE — CONSULTS
Medical Oncology/Hematology Consult Note  Susan Falcon, female, 77 y o , 1945,  PPHP 322/PPHP [id number], [id number]     Assessment and Plan  1  Well-known history of myeloproliferative disorder/JAK2 positive polycythemia vera: The patient is currently off of the [facility] treatment for her PV and getting mainly Aranesp on every 28 day basis  She will need to be supported with blood transfusions to keep her hemoglobin level above 7g at least or higher if she continues to be symptomatic  The [facility] was put on hold due to profound anemia by her oncology team   I would continue to hold the [facility] for the time being support her with blood transfusions as stated above  She needs to follow up with her primary oncologist Dr Jessica Gomes or his assistant once she gets discharged from this hospital stay  2  Chronic normocytic anemia:  Multifactorial including renal failure, myeloproliferative disorder and anemia of chronic disease  She does not seem to be iron deficient at this point in time  However, she most likely has poor iron utilization  Please call with questions  Reason for consultation:  Polycythemia vera, JAK2 positive was on Jakafi 10 mg twice a day which was put on hold on 09/22  History of present illness: This is a 78-year-old female with multiple comorbid conditions including well-known history of myeloproliferative disorder/polycythemia vera JAK2 positive which was treated with the [facility] starting and 2016 until recently  The patient has also other comorbid conditions including chronic bilateral hydronephrosis status post nephrostomy and cystectomy with ileal conduit in 2016  Renal failure status post AV fistula for preparation for hemodialysis  The patient was admitted after vascular surgical outpatient procedure for elective right brachial basilic fistula placement    Post surgery she was found to have a hemoglobin of 7 and was admitted for further evaluation and blood transfusion  She was transfused with the 2 units of packed red blood cells over the last the 24 hours  Her prior transfusion was on 05/12/2021  Her most recent blood work on 10/02/2021 showed hemoglobin of 6 7 with MCV of 94  White cells and platelets were within normal range  Creatinine 3 2      Review of Systems:   Review of Systems   Constitutional: Positive for activity change, fatigue and unexpected weight change  Negative for chills and fever  HENT: Negative for ear pain and sore throat  Eyes: Negative for pain and visual disturbance  Respiratory: Negative for cough and shortness of breath  Cardiovascular: Negative for chest pain and palpitations  Gastrointestinal: Negative for abdominal pain and vomiting  Genitourinary: Negative for dysuria and hematuria  Musculoskeletal: Negative for arthralgias and back pain  Skin: Negative for color change and rash  Neurological: Negative for seizures and syncope  Hematological: Bruises/bleeds easily  All other systems reviewed and are negative        Past Medical History:   Diagnosis Date    Anxiety     Bowel obstruction (HCC)     Chronic kidney disease     Chronic kidney disease (CKD), stage IV (severe) (HCC)     stage IV    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    Circulation problem     Compression fracture of cervical spine ([address] )     COVID-19 07/2021    hospitalized     Hernia of abdominal cavity     History of kidney problems     History of transfusion     Hydronephrosis     Hypertension     IBS (irritable bowel syndrome)     Incontinence     Lung mass     Improving on PET/CT 1/2016    Polycythemia vera ([address] )     Pulmonary embolism ([address] ) 2014    Shingles     Urinary tract infection        Past Surgical History:   Procedure Laterality Date    ABDOMINAL ADHESION SURGERY N/A 8/9/2020    Procedure: LYSIS ADHESIONS;  Surgeon: Charo Vargas DO;  [facility];  Service: General    BLADDER SUSPENSION      BOTOX INJECTION N/A 7/27/2016    Procedure: BOTOX INJECTION ;  Surgeon: Perfecto Hardwick MD;  [facility];  Service:    [address], RADICAL WITH ILEOCONDUIT N/A 10/4/2016    Procedure: Zara Hernandez WITH ILEAL CONDUIT ;  Surgeon: Perfecto Hardwick MD;  [facility];  Service:    Gricelda Diojohn CYSTOSCOPY W/ RETROGRADES Bilateral 7/27/2016    Procedure: Rhianna Mixer; RETROGRADE PYELOGRAM ;  Surgeon: Perfecto Hardwick MD;  [facility];  Service:     HERNIA REPAIR      IR AV FISTULAGRAM/GRAFTOGRAM  2/10/2021    IR NEPHROSTOMY TO NEPHROURETERAL STENT  5/15/2021    IR NEPHROSTOMY TO NEPHROURETERAL STENT  6/9/2021    IR NEPHROSTOMY TUBE CHECK AND/OR REMOVAL  6/16/2021    IR NEPHROSTOMY TUBE CHECK/CHANGE/REPOSITION/REINSERTION/UPSIZE  5/14/2021    IR NEPHROSTOMY TUBE CHECK/CHANGE/REPOSITION/REINSERTION/UPSIZE  5/21/2021    IR NEPHROSTOMY TUBE CHECK/CHANGE/REPOSITION/REINSERTION/UPSIZE  9/16/2021    IR NEPHROSTOMY TUBE PLACEMENT  5/10/2021    IR NEPHROSTOMY TUBE PLACEMENT  9/20/2021    IR NON-TUNNELED CENTRAL LINE PLACEMENT  7/17/2020    IR NON-TUNNELED CENTRAL LINE PLACEMENT  8/14/2020    IR NON-TUNNELED CENTRAL LINE PLACEMENT  7/16/2021    LAPAROTOMY N/A 8/9/2020    Procedure: LAPAROTOMY EXPLORATORY, PARASTOMAL HERNIA REPAIR WITH MESH;  Surgeon: Carlos Small DO;  [facility];  Service: General    CO ANASTOMOSIS,AV,ANY SITE Right 1/5/2021    Procedure: Creation of right brachiobasilic fistula; Surgeon: Kirstie Chairez MD;  [facility];  Service: Vascular    CO COLONOSCOPY FLX DX W/COLLJ SPEC WHEN PFRMD N/A 8/31/2016    Procedure: COLONOSCOPY;  Surgeon: Belem Brewster MD;  [facility];   Service: Gastroenterology    CO CYSTOSCOPY,INSERT URETERAL STENT Bilateral 7/27/2016    Procedure: STENT INSERTION; EXCISION OF MESH ;  Surgeon: Perfecto Hardwick MD;  [facility];  Service: Urology    CO REVISE AV FISTULA,W/O THROMBECTOMY Right 9/30/2021    Procedure: Superficialization and transposition of right brachiobasilic fistula; Surgeon: Mone Chairez MD;  [facility];  Service: Vascular    TONSILLECTOMY      TUBAL LIGATION      WISDOM TOOTH EXTRACTION         Family History   Problem Relation Age of Onset    Cancer Mother         small cell cancer     Heart disease Father     COPD Father     Heart disease Brother     Nephrolithiasis Brother        Social History     Socioeconomic History    Marital status:      Spouse name: None    Number of children: None    Years of education: None    Highest education level: None   Occupational History    None   Tobacco Use    Smoking status: Never Smoker    Smokeless tobacco: Never Used    Tobacco comment: n/a   Vaping Use    Vaping Use: Never used   Substance and Sexual Activity    Alcohol use: Not Currently     Comment: n/s    Drug use: Not Currently     Comment: n/a    Sexual activity: Not Currently     Partners: Male   Other Topics Concern    None   Social History Narrative    None     Social Determinants of Health     Financial Resource Strain:     Difficulty of Paying Living Expenses:    Food Insecurity:     Worried About Running Out of Food in the Last Year:     Ran Out of Food in the Last Year:    Transportation Needs:     Lack of Transportation (Medical):      Lack of Transportation (Non-Medical):    Physical Activity:     Days of Exercise per Week:     Minutes of Exercise per Session:    Stress:     Feeling of Stress :    Social Connections:     Frequency of Communication with Friends and Family:     Frequency of Social Gatherings with Friends and Family:     Attends [employer] Services:     Active Member of Clubs or Organizations:     Attends Club or Organization Meetings:     Marital Status:    Intimate Partner Violence:     Fear of Current or Ex-Partner:     Emotionally Abused:     Physically Abused:     Sexually Abused:          Current Facility-Administered Medications:     acetaminophen (TYLENOL) tablet 650 mg, 650 mg, Oral, Q6H PRN, Indigo Conner MD, 650 mg at 10/02/21 0547    atorvastatin (LIPITOR) tablet 40 mg, 40 mg, Oral, HS, Indigo Conner MD, 40 mg at 10/01/21 2138    calcitriol (ROCALTROL) capsule 0 25 mcg, 0 25 mcg, Oral, Daily, Indigo Conner MD, 0 25 mcg at 10/02/21 0846    citalopram (CeleXA) tablet 20 mg, 20 mg, Oral, Daily, Indigo Conner MD, 20 mg at 10/02/21 0847    heparin (porcine) subcutaneous injection 5,000 Units, 5,000 Units, Subcutaneous, Q8H [facility], Ollie James MD, 5,000 Units at 10/02/21 0547    levothyroxine tablet 75 mcg, 75 mcg, Oral, Early Morning, Indigo Conner MD, 75 mcg at 10/02/21 0547    melatonin tablet 3 mg, 3 mg, Oral, HS, Indigo Conner MD, 3 mg at 10/01/21 2138    metoprolol tartrate (LOPRESSOR) tablet 25 mg, 25 mg, Oral, BID, Indigo Conner MD, 25 mg at 10/02/21 0846    oxyCODONE (ROXICODONE) IR tablet 2 5 mg, 2 5 mg, Oral, Q4H PRN, Indigo Conner MD, 2 5 mg at 10/01/21 0908    oxyCODONE (ROXICODONE) IR tablet 5 mg, 5 mg, Oral, Q4H PRN, Indigo Conner MD, 5 mg at 09/30/21 2337    sodium bicarbonate tablet 650 mg, 650 mg, Oral, BID after meals, Norris Reddy MD    Medications Prior to Admission   Medication    acetaminophen (TYLENOL) 325 mg tablet    atorvastatin (LIPITOR) 40 mg tablet    calcitriol (ROCALTROL) 0 25 mcg capsule    Cholecalciferol (VITAMIN D3) 1000 UNITS CAPS    citalopram (CeleXA) 20 mg tablet    Jakafi 10 MG tablet    levothyroxine 75 mcg tablet    melatonin 3 mg    metoprolol tartrate (LOPRESSOR) 25 mg tablet    saccharomyces boulardii (Florastor) 250 mg capsule    sodium bicarbonate 650 mg tablet    apixaban (Eliquis) 2 5 mg    loperamide (IMODIUM) 2 mg capsule       Allergies   Allergen Reactions    Chlorhexidine Rash     petichi like rash when using chlorhexidine swabs prior to IV            Physical Exam:    /58   Pulse 78   Temp 98 6 °F (37 °C) (Oral)   Resp 18   Ht 5' (1 524 m)   Wt 83 9 kg (185 lb)   SpO2 99%   BMI 36 13 kg/m²     Physical Exam  Constitutional:       General: She is not in acute distress  Appearance: She is well-developed  She is obese  She is ill-appearing  She is not diaphoretic  HENT:      Head: Normocephalic and atraumatic  Nose: Nose normal    Eyes:      General: No scleral icterus  Right eye: No discharge  Left eye: No discharge  Conjunctiva/sclera: Conjunctivae normal       Pupils: Pupils are equal, round, and reactive to light  Neck:      Thyroid: No thyromegaly  Vascular: No JVD  Trachea: No tracheal deviation  Cardiovascular:      Rate and Rhythm: Normal rate and regular rhythm  Heart sounds: Normal heart sounds  No murmur heard  No friction rub  Pulmonary:      Effort: Pulmonary effort is normal  No respiratory distress  Breath sounds: Normal breath sounds  No stridor  No wheezing or rales  Chest:      Chest wall: No tenderness  Abdominal:      General: Bowel sounds are normal  There is no distension  Palpations: Abdomen is soft  There is no hepatomegaly, splenomegaly or mass  Tenderness: There is no abdominal tenderness  There is no guarding or rebound  Comments: Obese abdomen, ileal conduit back in place the left lower quadrant  Morbidly obese abdomen   Musculoskeletal:         General: No tenderness or deformity  Normal range of motion  Cervical back: Normal range of motion and neck supple  Lymphadenopathy:      Cervical: No cervical adenopathy  Skin:     General: Skin is warm and dry  Coloration: Skin is not pale  Findings: Bruising (Multiple bruises on the upper extremities) present  No erythema or rash  Neurological:      Mental Status: She is alert and oriented to person, place, and time  Cranial Nerves: No cranial nerve deficit        Coordination: Coordination normal       Deep Tendon Reflexes: Reflexes are normal and symmetric  Psychiatric:         Behavior: Behavior normal          Thought Content:  Thought content normal          Judgment: Judgment normal          Recent Results (from the past 48 hour(s))   CBC and Platelet    Collection Time: 10/01/21  1:07 PM   Result Value Ref Range    WBC 6 12 4 31 - 10 16 Thousand/uL    RBC 2 30 (L) 3 81 - 5 12 Million/uL    Hemoglobin 7 0 (L) 11 5 - 15 4 g/dL    Hematocrit 23 6 (L) 34 8 - 46 1 %     (H) 82 - 98 fL    MCH 30 4 26 8 - 34 3 pg    MCHC 29 7 (L) 31 4 - 37 4 g/dL    RDW 15 8 (H) 11 6 - 15 1 %    Platelets 357 871 - 677 Thousands/uL    MPV 9 8 8 9 - 12 7 fL   Basic metabolic panel    Collection Time: 10/01/21  1:07 PM   Result Value Ref Range    Sodium 138 136 - 145 mmol/L    Potassium 4 2 3 5 - 5 3 mmol/L    Chloride 112 (H) 100 - 108 mmol/L    CO2 21 21 - 32 mmol/L    ANION GAP 5 4 - 13 mmol/L    BUN 28 (H) 5 - 25 mg/dL    Creatinine 3 11 (H) 0 60 - 1 30 mg/dL    Glucose 143 (H) 65 - 140 mg/dL    Calcium 8 3 8 3 - 10 1 mg/dL    eGFR 14 ml/min/1 73sq m   Type and screen    Collection Time: 10/01/21  3:02 PM   Result Value Ref Range    ABO Grouping AB     Rh Factor Positive     Antibody Screen Negative     Specimen Expiration Date 20211004    Prepare Leukoreduced RBC: 1 Units    Collection Time: 10/02/21  5:55 AM   Result Value Ref Range    Unit Product Code T2066Z36     Unit Number [id number]     Unit ABO AB     [facility]     Crossmatch Compatible     Unit Dispense Status Presumed Trans     Unit Product Volume 350 mL   CBC and Platelet    Collection Time: 10/02/21  6:46 AM   Result Value Ref Range    WBC 4 56 4 31 - 10 16 Thousand/uL    RBC 2 34 (L) 3 81 - 5 12 Million/uL    Hemoglobin 6 7 (LL) 11 5 - 15 4 g/dL    Hematocrit 21 9 (L) 34 8 - 46 1 %    MCV 94 82 - 98 fL    MCH 28 6 26 8 - 34 3 pg    MCHC 30 6 (L) 31 4 - 37 4 g/dL    RDW 19 8 (H) 11 6 - 15 1 %    Platelets 256 116 - 768 Thousands/uL    MPV 9 9 8 9 - 12 7 fL   Basic metabolic panel    Collection Time: 10/02/21  6:46 AM   Result Value Ref Range    Sodium 139 136 - 145 mmol/L    Potassium 3 9 3 5 - 5 3 mmol/L    Chloride 113 (H) 100 - 108 mmol/L    CO2 18 (L) 21 - 32 mmol/L    ANION GAP 8 4 - 13 mmol/L    BUN 31 (H) 5 - 25 mg/dL    Creatinine 3 26 (H) 0 60 - 1 30 mg/dL    Glucose 99 65 - 140 mg/dL    Calcium 8 1 (L) 8 3 - 10 1 mg/dL    eGFR 13 ml/min/1 73sq m   Magnesium    Collection Time: 10/02/21  6:46 AM   Result Value Ref Range    Magnesium 1 9 1 6 - 2 6 mg/dL   Phosphorus    Collection Time: 10/02/21  6:46 AM   Result Value Ref Range    Phosphorus 3 4 2 3 - 4 1 mg/dL   Iron Saturation %    Collection Time: 10/02/21  6:46 AM   Result Value Ref Range    Iron Saturation 38 15 - 50 %    TIBC 487 (H) 250 - 450 ug/dL    Iron 187 (H) 50 - 170 ug/dL   Ferritin    Collection Time: 10/02/21  6:46 AM   Result Value Ref Range    Ferritin 552 (H) 8 - 388 ng/mL   Prepare Leukoreduced RBC: 1 Units    Collection Time: 10/02/21  9:52 AM   Result Value Ref Range    Unit Product Code M0667T76     Unit Number [id number]     Unit ABO AB     Unit RH NEG     Crossmatch Compatible     Unit Dispense Status Issued     Unit Product Volume 350 mL       IR nephrostomy tube placement    Result Date: 9/21/2021  Narrative: PROCEDURE: Nephrostomy tube placement  Retrograde PCN U replacement  STAFF: ROXANN Sweet  CONTRAST: 40 mL Omnipaque 350  FLUOROSCOPY TIME: 25 minutes  NUMBER OF IMAGES: Multiple COMPLICATIONS: None  MEDICATIONS: General anesthesia was provided by the anesthesia team, please refer to their note for details  INDICATION: History of left ilial conduit ureteral obstruction, managed by retrograde left PCNU  The tube has recently become completely dislodged  PROCEDURE: At presentation, the patient's retrograde PCN U has been completely dislodged    The patient was placed left side upon the table to gain access to both the left kidney as well as ostomy for replacement  The left kidney was localized with ultrasound, the skin and underlying subcutaneous tissues were anesthetized with local lidocaine  A 21-gauge needle was advanced into the renal collecting system under ultrasound guidance, injection of contrast confirmed placement within the collecting system  The system was upsized with an AccuStick transitional dilator, and with use of a angled catheter and Glidewire, access through the ileal conduit and out of the ostomy was achieved  The catheter was then  placed over the wire from the ostomy site and advanced back into the collecting system, after which a stiff Amplatz wire was placed  A new 40 cm x 10-American locking pigtail retrograde PCNU was placed over the wire and advanced to the collecting system  The pigtail was locked  Injection of contrast confirmed appropriate placement  Considering this was a new access site into the left kidney, an 8-American locking pigtail percutaneous nephrostomy tube was placed, to help avoid complications of removing this access just after placement  Injection of contrast through this drain demonstrated appropriate position within the collecting system, and brisk uptake of contrast into the retrograde PCNU  FINDINGS: Existing left PCN U completely dislodged at presentation  Moderate hydronephrosis of an atrophic left kidney  Impression: Successful left 10-American by 40 cm retrograde PCN U placement  Left percutaneous nephrostomy tube placement  PLAN: The left percutaneous nephrostomy tube can be capped, patient should return in 2 weeks for possible removal  The retrograde PCN U  Rudy drain via the ostomy, the patient should return in 8-12 weeks for routine exchange  Workstation performed: [id number]     IR nephrostomy tube check/change/reposition/reinsertion/upsize    Result Date: 9/16/2021  Narrative: Retrograde left PCNU check and exchange   Clinical History: Left ureteral obstruction in the setting of prior conduit, percutaneous nephrostomy placed May Contrast: 10 mL of iohexol (OMNIPAQUE) Fluoro time: 2 6 Minutes Number of Images: Multiple Radiation dose: 76 mGy Conscious sedation time: N/A Technique/intervention: The patient was placed in a prone position on the fluoroscopic table  The abdomen was prepped and draped in the usual sterile fashion  Timeout was performed per protocol  Contrast was injected through the existing retrograde PCNU confirming appropriate location within the renal collecting system  The pigtail locking mechanism was released and the catheter was removed over a [employer] wire  A new 10-Scottish by 45 cm all-purpose drainage catheter was advanced over the wire with the pigtail curled in the renal collecting system  The pigtail locking mechanism was engaged and contrast injected through the catheter confirming appropriate location within the renal collecting system  Impression: Impression: 1  Retrograde left PCNU check and exchange  Workstation performed: [id number]       Labs and pertinent reports reviewed  This note has been generated by voice recognition software system  Therefore, there may be spelling, grammar, and or syntax errors  Please contact if questions arise

## 2021-10-02 NOTE — PLAN OF CARE
Problem: INFECTION - ADULT  Goal: Absence or prevention of progression during hospitalization  Description: INTERVENTIONS:  - Assess and monitor for signs and symptoms of infection  - Monitor lab/diagnostic results  - Monitor all insertion sites, i e  indwelling lines, tubes, and drains  - Monitor endotracheal if appropriate and nasal secretions for changes in amount and color  - [place] appropriate cooling/warming therapies per order  - Administer medications as ordered  - Instruct and encourage patient and family to use good hand hygiene technique  - Identify and instruct in appropriate isolation precautions for identified infection/condition  Outcome: Progressing  Goal: Absence of fever/infection during neutropenic period  Description: INTERVENTIONS:  - Monitor WBC    Outcome: Progressing

## 2021-10-02 NOTE — PROGRESS NOTES
Picc team attempted IV and were unsuccessful  Dr Gay Faust aware will try again when we get hemoglobin at 1600   Per Dr Gay Faust

## 2021-10-02 NOTE — PHYSICAL THERAPY NOTE
Physical Therapy Cancellation Note    Patient's Name: David Ser        10/02/21 1256   PT Last Visit   PT Visit Date 10/02/21   Note Type   Note type Evaluation   Cancel Reasons Medical status       Orders received, chart reviewed  Pt admit with stage 5 CKD  PT attempted to see pt, however per RN pt with low hemoglobin, not appropriate for therapy at this time  Will follow for PT evaluation/treatment as medically appropriate  Thank you       Genia Mc, PT, DPT

## 2021-10-02 NOTE — ASSESSMENT & PLAN NOTE
Significant anemia noted secondary to chronic blood loss from ostomy bag  Hold JAKAFI and consult Hem/onc to recommend treatment

## 2021-10-02 NOTE — ASSESSMENT & PLAN NOTE
Patient has poor venous access, multiple attempts were made for obtaining IV access  Unfortunately patient is not candidate for PICC line due to advanced CKD    Will ask PICC team to obtain IV access with ultrasound

## 2021-10-02 NOTE — CONSULTS
[address] [facility] [birth year], 77 y o  female MRN: [id number]  Unit/Bed#: [facility] 322-01 Encounter: [id number]  Primary Care Provider: Ar Martinez MD   Date and time admitted to hospital: 9/30/2021 11:04 AM    Inpatient consult to Internal Medicine  Consult performed by: Vicky Holliday MD  Consult ordered by: Mikki Hess MD          * Stage 5 chronic kidney disease not on chronic dialysis [facility])  Assessment & Plan  Lab Results   Component Value Date    EGFR 13 10/02/2021    EGFR 14 10/01/2021    EGFR 12 09/10/2021    CREATININE 3 26 (H) 10/02/2021    CREATININE 3 11 (H) 10/01/2021    CREATININE 3 42 (H) 09/10/2021     · Patient is a 76 yo F w/ progressing CKD, s/p creation R brachiobasilic fistula now s/p superficialization with transposition 9/30  · Initially admitted for OP elective surgery but found to have significant anemia  · Admitted under Vascular surgery   · SLIM consulted for medical management and transfer care over under [facility]  · Consult nephrology    Poor venous access  Assessment & Plan  Patient has poor venous access, multiple attempts were made for obtaining IV access  Unfortunately patient is not candidate for PICC line due to advanced CKD  Will ask PICC team to obtain IV access with ultrasound    Iron deficiency anemia due to chronic blood loss  Assessment & Plan  Patient received 1 units PRBC yesterday, and one 1 unit PRBC today  Hold jakafi  Obtain iron panel, FOBT, will give venofer if necessary  Consult hematology    Polycythemia vera ([address] )  Assessment & Plan  Significant anemia noted secondary to chronic blood loss from ostomy bag  Hold JAKAFI and consult Hem/onc to recommend treatment       Obstructive uropathy  Assessment & Plan  · Chronic bilateral hydronephrosis with stent placement  · underwent conversion to retrograde nephroureterostomy tube     · Tea colored urine in ostomy bag since left PCN placement 9/20/21  · IR consulted for left pcn removal, now scheduled for 10/5  · In 10/2020 Hydronephrosis and hematuria 5/2021  S/P Nephrostomy tube placement 5/11/21  Chronic saddle pulmonary embolism (HCC)  Assessment & Plan  On Eliquis 2 5 mg BID at home, currently on hold by primary team      VTE Prophylaxis: Heparin  / sequential compression device     Recommendations for Discharge:  · As per primary team    Counseling / Coordination of Care Time: 45 minutes  Greater than 50% of total time spent on patient counseling and coordination of care  Collaboration of Care: Were Recommendations Directly Discussed with Primary Treatment Team? - Yes     History of Present Illness:    Tretha Cogan is a 77 y o  female who is originally admitted to the vascular surgery service  She was initially admitted for outpatient procedure for elective right brachial basilic fistula superficialization with transposition  Post surgery, patient was found to have hemoglobin of 7, therefore admitted further for blood transfusion  We are consulted for medical management  Patient has ongoing anemia, status post 1 unit PRBC transfusion yesterday and 1 unit PRBC transfusion today  Surprisingly patient's hemoglobin yesterday was 7, and after receiving 1 unit PRBC, it decreased to 6 7 today  No active bleeding noted except for tea-colored urine in the ostomy bag  She denies any other active bleeding, no blood in the stool  No other bleeding noted  Patient is on Jakafi for polycythemia vera  Review of Systems:    Review of Systems   Constitutional: Positive for fatigue  Negative for activity change, appetite change and fever  HENT: Negative for congestion and sore throat  Eyes: Negative for pain and visual disturbance  Respiratory: Negative for cough, shortness of breath and wheezing  Cardiovascular: Negative for chest pain, palpitations and leg swelling  Gastrointestinal: Negative for abdominal distention and abdominal pain     Endocrine: Negative for polyuria  Genitourinary: Positive for hematuria  Negative for difficulty urinating and dysuria  Musculoskeletal: Positive for back pain and gait problem  Negative for arthralgias  Skin: Positive for color change  Negative for rash and wound  Allergic/Immunologic: Negative for immunocompromised state  Neurological: Negative for dizziness, speech difficulty and headaches  Hematological: Negative for adenopathy  Bruises/bleeds easily  Psychiatric/Behavioral: Negative for sleep disturbance  The patient is not hyperactive          Past Medical and Surgical History:     Past Medical History:   Diagnosis Date    Anxiety     Bowel obstruction (HCC)     Chronic kidney disease     Chronic kidney disease (CKD), stage IV (severe) (HCC)     stage IV    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    Circulation problem     Compression fracture of [address] spine [address] [address]     COVID-19 07/2021    hospitalized     Hernia of abdominal cavity     History of kidney problems     History of transfusion     Hydronephrosis     Hypertension     IBS (irritable bowel syndrome)     Incontinence     Lung mass     Improving on PET/CT 1/2016    Polycythemia vera ([facility] [address]     Pulmonary embolism ([facility] [address] ) 2014    Shingles     Urinary tract infection        Past Surgical History:   Procedure Laterality Date    ABDOMINAL ADHESION SURGERY N/A 8/9/2020    Procedure: LYSIS ADHESIONS;  Surgeon: Kaitlynn Bustillo DO;  [facility];  Service: General    BLADDER SUSPENSION      BOTOX INJECTION N/A 7/27/2016    Procedure: BOTOX INJECTION ;  Surgeon: Kristin Macedo MD;  [facility];  Service:     CHOLECYSTECTOMY N/A     COLONOSCOPY      CYSTECTOMY, RADICAL WITH ILEOCONDUIT N/A 10/4/2016    Procedure: Eleonora Babin ;  Surgeon: Kristin Macedo MD;  [facility];  Service:     CYSTOSCOPY W/ RETROGRADES Bilateral 7/27/2016    Procedure: Imer Aldana; RETROGRADE PYELOGRAM ;  Surgeon: Gunner Cody MD;  [facility];  Service:     HERNIA REPAIR      IR AV FISTULAGRAM/GRAFTOGRAM  2/10/2021    IR NEPHROSTOMY TO NEPHROURETERAL STENT  5/15/2021    IR NEPHROSTOMY TO NEPHROURETERAL STENT  6/9/2021    IR NEPHROSTOMY TUBE CHECK AND/OR REMOVAL  6/16/2021    IR NEPHROSTOMY TUBE CHECK/CHANGE/REPOSITION/REINSERTION/UPSIZE  5/14/2021    IR NEPHROSTOMY TUBE CHECK/CHANGE/REPOSITION/REINSERTION/UPSIZE  5/21/2021    IR NEPHROSTOMY TUBE CHECK/CHANGE/REPOSITION/REINSERTION/UPSIZE  9/16/2021    IR NEPHROSTOMY TUBE PLACEMENT  5/10/2021    IR NEPHROSTOMY TUBE PLACEMENT  9/20/2021    IR NON-TUNNELED CENTRAL LINE PLACEMENT  7/17/2020    IR NON-TUNNELED CENTRAL LINE PLACEMENT  8/14/2020    IR NON-TUNNELED CENTRAL LINE PLACEMENT  7/16/2021    LAPAROTOMY N/A 8/9/2020    Procedure: LAPAROTOMY EXPLORATORY, PARASTOMAL HERNIA REPAIR WITH MESH;  Surgeon: Arya Andrews DO;  [facility];  Service: General    IL ANASTOMOSIS,AV,ANY SITE Right 1/5/2021    Procedure: Creation of right brachiobasilic fistula; Surgeon: Yamileth Chairez MD;  [facility];  Service: Vascular    IL COLONOSCOPY FLX DX W/COLLJ SPEC WHEN PFRMD N/A 8/31/2016    Procedure: COLONOSCOPY;  Surgeon: Nellie Parrish MD;  Location: [facility]; Service: Gastroenterology    IL CYSTOSCOPY,INSERT URETERAL STENT Bilateral 7/27/2016    Procedure: STENT INSERTION; EXCISION OF MESH ;  Surgeon: Gunner Cody MD;  [facility];  Service: Urology    IL REVISE AV FISTULA,W/O THROMBECTOMY Right 9/30/2021    Procedure: Superficialization and transposition of right brachiobasilic fistula; Surgeon: Evy Chairez MD;  [facility];  Service: Vascular    TONSILLECTOMY      TUBAL LIGATION      WISDOM TOOTH EXTRACTION         Meds/Allergies:    PTA meds:   Prior to Admission Medications   Prescriptions Last Dose Informant Patient Reported? Taking?    Cholecalciferol (VITAMIN D3) 1000 UNITS CAPS 2021 at Unknown time Self Yes Yes   Sig: Take by mouth daily 1000 IU   Jakafi 10 MG tablet Past Month at Unknown time Self No Yes   Sig: TAKE 1 TABLET (10 MG TOTAL) BY MOUTH DAILY   acetaminophen (TYLENOL) 325 mg tablet Past Month at Unknown time Self No Yes   Si mg every 6 hours as needed for mild pain or headache   apixaban (Eliquis) 2 5 mg 2021 Self No No   Sig: Take 1 tablet (2 5 mg total) by mouth 2 (two) times a day   Patient taking differently: Take 2 5 mg by mouth daily    atorvastatin (LIPITOR) 40 mg tablet 2021 at Unknown time Self No Yes   Sig: Take 1 tablet (40 mg total) by mouth daily at bedtime   calcitriol (ROCALTROL) 0 25 mcg capsule 2021 at Unknown time Self No Yes   Sig: Take 1 capsule (0 25 mcg total) by mouth daily   citalopram (CeleXA) 20 mg tablet 2021 at Unknown time Self No Yes   Sig: Take 1 tablet (20 mg total) by mouth daily   levothyroxine 75 mcg tablet 2021 at Unknown time Self No Yes   Sig: Take 1 tablet (75 mcg total) by mouth daily   loperamide (IMODIUM) 2 mg capsule More than a month at Unknown time Self No No   Sig: Take 1 capsule (2 mg total) by mouth 4 (four) times a day as needed for diarrhea   melatonin 3 mg 2021 at Unknown time Self No Yes   Sig: Take 1 tablet (3 mg total) by mouth daily at bedtime   metoprolol tartrate (LOPRESSOR) 25 mg tablet 2021 at Unknown time Self No Yes   Sig: Take 1 tablet (25 mg total) by mouth 2 (two) times a day   saccharomyces boulardii (Florastor) 250 mg capsule 2021 at Unknown time Self No Yes   Sig: Take 1 capsule (250 mg total) by mouth daily   Patient taking differently: Take 250 mg by mouth daily as needed    sodium bicarbonate 650 mg tablet 2021 at Unknown time Self No Yes   Sig: Take 1 tablet (650 mg total) by mouth 2 (two) times daily after meals      Facility-Administered Medications: None       Allergies:    Allergies   Allergen Reactions    Chlorhexidine Rash     petichi like rash when using chlorhexidine swabs prior to IV  Social History:     Marital Status:     Substance Use History:   Social History     Substance and Sexual Activity   Alcohol Use Not Currently    Comment: n/s     Social History     Tobacco Use   Smoking Status Never Smoker   Smokeless Tobacco Never Used   Tobacco Comment    n/a     Social History     Substance and Sexual Activity   Drug Use Not Currently    Comment: n/a       Family History:    Family History   Problem Relation Age of Onset    Cancer Mother         small cell cancer     Heart disease Father     COPD Father     Heart disease Brother     Nephrolithiasis Brother        Physical Exam:     Vitals:   Blood Pressure: 115/64 (10/02/21 1040)  Pulse: 72 (10/02/21 1040)  Temperature: 98 6 °F (37 °C) (10/02/21 1040)  Temp Source: Oral (10/02/21 1040)  Respirations: 18 (10/02/21 1040)  Height: 5' (152 4 cm) (09/30/21 2337)  Weight - Scale: 83 9 kg (185 lb) (09/30/21 2337)  SpO2: 96 % (10/02/21 0950)    Physical Exam  Constitutional:       Appearance: Normal appearance  She is well-developed  She is obese  HENT:      Head: Normocephalic and atraumatic  Right Ear: External ear normal       Left Ear: External ear normal    Cardiovascular:      Rate and Rhythm: Normal rate and regular rhythm  Pulses: Normal pulses  Heart sounds: Normal heart sounds  Pulmonary:      Effort: Pulmonary effort is normal  No respiratory distress  Breath sounds: Normal breath sounds  Abdominal:      General: Abdomen is flat  There is no distension  Palpations: Abdomen is soft  Tenderness: There is no abdominal tenderness  Comments: Ostomy bag has tea-colored urine   Musculoskeletal:         General: No swelling or deformity  Cervical back: Normal range of motion  Right lower leg: No edema  Left lower leg: No edema  Skin:     General: Skin is warm and dry  Neurological:      General: No focal deficit present  Mental Status: She is alert and oriented to person, place, and time  Psychiatric:         Mood and Affect: Mood normal            Additional Data:     Lab Results: I have personally reviewed pertinent reports  Results from last 7 days   Lab Units 10/02/21  0646 09/29/21  1233   WBC Thousand/uL 4 56 4 87   HEMOGLOBIN g/dL 6 7* 7 6*   HEMATOCRIT % 21 9* 24 7*   PLATELETS Thousands/uL 200 266   NEUTROS PCT %  --  71   LYMPHS PCT %  --  14   MONOS PCT %  --  11   EOS PCT %  --  2     Results from last 7 days   Lab Units 10/02/21  0646   SODIUM mmol/L 139   POTASSIUM mmol/L 3 9   CHLORIDE mmol/L 113*   CO2 mmol/L 18*   BUN mg/dL 31*   CREATININE mg/dL 3 26*   ANION GAP mmol/L 8   CALCIUM mg/dL 8 1*   GLUCOSE RANDOM mg/dL 99     Results from last 7 days   Lab Units 09/30/21  1206   INR  1 12         Lab Results   Component Value Date/Time    HGBA1C 5 5 09/02/2020 05:31 AM    HGBA1C 5 1 05/20/2015 07:06 AM               Imaging: I have personally reviewed pertinent reports  No orders to display       ** Please Note: This note has been constructed using a voice recognition system   **

## 2021-10-02 NOTE — PROGRESS NOTES
PICC consult received  Patient with stage 5 CKD with new right fistula  Spoke with Dr Carlie Gallo, patient is not appropriate for PICC placement  Dr Zheng Ponce notified via tiger text  Requested I attempt peripheral IV  Patient has a very large infiltrate above LAC which would make PICC placement impossible at this time anyway  Only vein able to be visualized on ultrasound leads into this infiltrate  Attempted IV x2 in left hand and left wrist which both immediately infiltrated  Primary RN aware unable to obtain IV

## 2021-10-02 NOTE — ASSESSMENT & PLAN NOTE
Lab Results   Component Value Date    EGFR 13 10/02/2021    EGFR 14 10/01/2021    EGFR 12 09/10/2021    CREATININE 3 26 (H) 10/02/2021    CREATININE 3 11 (H) 10/01/2021    CREATININE 3 42 (H) 09/10/2021     · Patient is a 78 yo F w/ progressing CKD, s/p creation R brachiobasilic fistula now s/p superficialization with transposition 9/30  · Initially admitted for OP elective surgery but found to have significant anemia    · Admitted under Vascular surgery   · SLIM consulted for medical management and transfer care over under [facility]  · Consult nephrology

## 2021-10-02 NOTE — ASSESSMENT & PLAN NOTE
Patient received 1 units PRBC yesterday, and one 1 unit PRBC today  Hold jakafi  Obtain iron panel, FOBT, will give venofer if necessary  Consult hematology

## 2021-10-02 NOTE — PROGRESS NOTES
Progress Note - Vascular Surgery   Shyann Camacho 77 y o  female MRN: [id number]  Unit/Bed#: [facility] 322-01 Encounter: [id number]    Assessment:  Patient is a 78 yo F w/ progressing CKD, s/p creation R brachiobasilic fistula now s/p superficialization with transposition 9/30  Good thrill, palpable radial pulse  Plan:  Continue pain control  Continue renal diet  Plan for PT OT today  Dispo pending PT OT evaulation  If stable for home, will coordinate with CM to arrange for transport    Subjective/Objective     Subjective:   S/p 1U PRBC yesterday for Hb of 7  Fever with tmax 101 4, likely related to transfusion, afebrile overnight  No parasthesias or weakness right arm  Objective:    Blood pressure 129/70, pulse 77, temperature 99 1 °F (37 3 °C), temperature source Oral, resp  rate 16, height 5' (1 524 m), weight 83 9 kg (185 lb), SpO2 93 %, not currently breastfeeding  ,Body mass index is 36 13 kg/m²        Intake/Output Summary (Last 24 hours) at 10/2/2021 5921  Last data filed at 10/1/2021 2130  Gross per 24 hour   Intake 705 ml   Output --   Net 705 ml       Invasive Devices     Peripheral Intravenous Line            Peripheral IV 10/01/21 Left Antecubital <1 day          Line            Hemodialysis AV Fistula Right Upper arm -- days    Hemodialysis AV Fistula 09/30/21 2 days          Drain            Percutaneous Nephroureteral Tube (PCNU) -- days    Urostomy Ileal conduit RUQ 1823 days    Percutaneous Nephroureteral Tube (PCNU) Left 1 10 Fr 40 Cm 15 days    Nephrostomy Left 2 8 Fr  11 days                Physical Exam:   Gen:  NAD  CV:  warm, well-perfused  Lungs: nl effort  Abd:  soft, NT/ND  Ext:  Ecchymosis around incision, good thrill on fistula, palpable radial pulse, motor and sensory intact  Neuro: A&Ox3     Results from last 7 days   Lab Units 10/01/21  1307 09/29/21  1233   WBC Thousand/uL 6 12 4 87   HEMOGLOBIN g/dL 7 0* 7 6*   HEMATOCRIT % 23 6* 24 7*   PLATELETS Thousands/uL 256 266     Results from last 7 days   Lab Units 10/02/21  0646 10/01/21  1307   POTASSIUM mmol/L 3 9 4 2   CHLORIDE mmol/L 113* 112*   CO2 mmol/L 18* 21   BUN mg/dL 31* 28*   CREATININE mg/dL 3 26* 3 11*   CALCIUM mg/dL 8 1* 8 3     Results from last 7 days   Lab Units 09/30/21  1206   INR  1 12

## 2021-10-03 ENCOUNTER — APPOINTMENT (INPATIENT)
Dept: RADIOLOGY | Facility: HOSPITAL | Age: 76
DRG: 981 | End: 2021-10-03
Payer: COMMERCIAL

## 2021-10-03 LAB
ABO GROUP BLD BPU: NORMAL
ANION GAP SERPL CALCULATED.3IONS-SCNC: 5 MMOL/L (ref 4–13)
BPU ID: NORMAL
BUN SERPL-MCNC: 32 MG/DL (ref 5–25)
CALCIUM SERPL-MCNC: 8.2 MG/DL (ref 8.3–10.1)
CHLORIDE SERPL-SCNC: 110 MMOL/L (ref 100–108)
CO2 SERPL-SCNC: 25 MMOL/L (ref 21–32)
CREAT SERPL-MCNC: 3.1 MG/DL (ref 0.6–1.3)
CROSSMATCH: NORMAL
ERYTHROCYTE [DISTWIDTH] IN BLOOD BY AUTOMATED COUNT: 18.7 % (ref 11.6–15.1)
GFR SERPL CREATININE-BSD FRML MDRD: 14 ML/MIN/1.73SQ M
GLUCOSE SERPL-MCNC: 106 MG/DL (ref 65–140)
HCT VFR BLD AUTO: 22.3 % (ref 34.8–46.1)
HCT VFR BLD AUTO: 23.5 % (ref 34.8–46.1)
HGB BLD-MCNC: 7 G/DL (ref 11.5–15.4)
HGB BLD-MCNC: 7.3 G/DL (ref 11.5–15.4)
MCH RBC QN AUTO: 29.1 PG (ref 26.8–34.3)
MCHC RBC AUTO-ENTMCNC: 31.1 G/DL (ref 31.4–37.4)
MCV RBC AUTO: 94 FL (ref 82–98)
PLATELET # BLD AUTO: 203 THOUSANDS/UL (ref 149–390)
PMV BLD AUTO: 9.4 FL (ref 8.9–12.7)
POTASSIUM SERPL-SCNC: 4.3 MMOL/L (ref 3.5–5.3)
RBC # BLD AUTO: 2.51 MILLION/UL (ref 3.81–5.12)
SODIUM SERPL-SCNC: 140 MMOL/L (ref 136–145)
UNIT DISPENSE STATUS: NORMAL
UNIT PRODUCT CODE: NORMAL
UNIT PRODUCT VOLUME: 350 ML
UNIT RH: NORMAL
WBC # BLD AUTO: 6.17 THOUSAND/UL (ref 4.31–10.16)

## 2021-10-03 PROCEDURE — 85027 COMPLETE CBC AUTOMATED: CPT | Performed by: SURGERY

## 2021-10-03 PROCEDURE — NC001 PR NO CHARGE: Performed by: ANESTHESIOLOGY

## 2021-10-03 PROCEDURE — 80048 BASIC METABOLIC PNL TOTAL CA: CPT | Performed by: SURGERY

## 2021-10-03 PROCEDURE — 99232 SBSQ HOSP IP/OBS MODERATE 35: CPT | Performed by: INTERNAL MEDICINE

## 2021-10-03 PROCEDURE — 85014 HEMATOCRIT: CPT | Performed by: FAMILY MEDICINE

## 2021-10-03 PROCEDURE — 99024 POSTOP FOLLOW-UP VISIT: CPT | Performed by: SURGERY

## 2021-10-03 PROCEDURE — 85018 HEMOGLOBIN: CPT | Performed by: FAMILY MEDICINE

## 2021-10-03 PROCEDURE — 99232 SBSQ HOSP IP/OBS MODERATE 35: CPT | Performed by: FAMILY MEDICINE

## 2021-10-03 PROCEDURE — 71045 X-RAY EXAM CHEST 1 VIEW: CPT

## 2021-10-03 PROCEDURE — 02HV33Z INSERTION OF INFUSION DEVICE INTO SUPERIOR VENA CAVA, PERCUTANEOUS APPROACH: ICD-10-PCS | Performed by: RADIOLOGY

## 2021-10-03 PROCEDURE — 36556 INSERT NON-TUNNEL CV CATH: CPT | Performed by: ANESTHESIOLOGY

## 2021-10-03 RX ORDER — SODIUM CHLORIDE 30 MG/ML INHALATION SOLUTION 30 MG/ML
4 SOLUTION INHALANT
Status: DISCONTINUED | OUTPATIENT
Start: 2021-10-03 | End: 2021-10-03

## 2021-10-03 RX ADMIN — CALCITRIOL CAPSULES 0.25 MCG 0.25 MCG: 0.25 CAPSULE ORAL at 08:39

## 2021-10-03 RX ADMIN — CITALOPRAM HYDROBROMIDE 20 MG: 20 TABLET ORAL at 08:39

## 2021-10-03 RX ADMIN — MELATONIN TAB 3 MG 3 MG: 3 TAB at 21:15

## 2021-10-03 RX ADMIN — SODIUM BICARBONATE 650 MG TABLET 650 MG: at 17:26

## 2021-10-03 RX ADMIN — HEPARIN SODIUM 5000 UNITS: 5000 INJECTION INTRAVENOUS; SUBCUTANEOUS at 21:15

## 2021-10-03 RX ADMIN — ATORVASTATIN CALCIUM 40 MG: 40 TABLET, FILM COATED ORAL at 21:15

## 2021-10-03 RX ADMIN — SODIUM BICARBONATE 650 MG TABLET 650 MG: at 08:39

## 2021-10-03 RX ADMIN — METOPROLOL TARTRATE 25 MG: 25 TABLET, FILM COATED ORAL at 08:39

## 2021-10-03 RX ADMIN — LEVOTHYROXINE SODIUM 75 MCG: 75 TABLET ORAL at 06:04

## 2021-10-03 RX ADMIN — METOPROLOL TARTRATE 25 MG: 25 TABLET, FILM COATED ORAL at 17:26

## 2021-10-03 RX ADMIN — HEPARIN SODIUM 5000 UNITS: 5000 INJECTION INTRAVENOUS; SUBCUTANEOUS at 15:02

## 2021-10-03 NOTE — RESPIRATORY THERAPY NOTE
RT Protocol Note  Haresh De Paz 77 y o  female MRN: [id number]  Unit/Bed#: [facility] 322-01 Encounter: [id number]    Assessment    Principal Problem:    Stage 5 chronic kidney disease not on chronic dialysis [facility])  Active Problems:    Chronic saddle pulmonary embolism (HCC)    Obstructive uropathy    Polycythemia vera (HCC)    Iron deficiency anemia due to chronic blood loss    Poor venous access      Home Pulmonary Medications:  none       Past Medical History:   Diagnosis Date    Anxiety     Bowel obstruction (HCC)     Chronic kidney disease     Chronic kidney disease (CKD), stage IV (severe) (HCC)     stage IV    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    Circulation problem     Compression fracture of cervical spine ([facility] [address] )     COVID-19 07/2021    hospitalized     Hernia of abdominal cavity     History of kidney problems     History of transfusion     Hydronephrosis     Hypertension     IBS (irritable bowel syndrome)     Incontinence     Lung mass     Improving on PET/CT 1/2016    Polycythemia vera ([facility] [address]     Pulmonary embolism ([facility] [address] ) 2014    Shingles     Urinary tract infection      Social History     Socioeconomic History    Marital status:       Spouse name: None    Number of children: None    Years of education: None    Highest education level: None   Occupational History    None   Tobacco Use    Smoking status: Never Smoker    Smokeless tobacco: Never Used    Tobacco comment: n/a   Vaping Use    Vaping Use: Never used   Substance and Sexual Activity    Alcohol use: Not Currently     Comment: n/s    Drug use: Not Currently     Comment: n/a    Sexual activity: Not Currently     Partners: Male   Other Topics Concern    None   Social History Narrative    None     Social Determinants of Health     Financial Resource Strain:     Difficulty of Paying Living Expenses:    Food Insecurity:     Worried About Running Out of Food in the Last Year:     [employer] in the Last Year:    Transportation Needs:     Lack of Transportation (Medical):  Lack of Transportation (Non-Medical):    Physical Activity:     Days of Exercise per Week:     Minutes of Exercise per Session:    Stress:     Feeling of Stress :    Social Connections:     Frequency of Communication with Friends and Family:     Frequency of Social Gatherings with Friends and Family:     Attends [employer] Services:     Active Member of Clubs or Organizations:     Attends Club or Organization Meetings:     Marital Status:    Intimate Partner Violence:     Fear of Current or Ex-Partner:     Emotionally Abused:     Physically Abused:     Sexually Abused:        Subjective         Objective    Physical Exam:   Assessment Type: Assess only  General Appearance: Alert  Respiratory Pattern: Normal  Chest Assessment: Chest expansion symmetrical  Bilateral Breath Sounds: Clear, Diminished  Cough: Strong, Dry, Non-productive  O2 Device: RA    Vitals:  Blood pressure 126/65, pulse 71, temperature 98 7 °F (37 1 °C), resp  rate 14, height 5' (1 524 m), weight 83 9 kg (185 lb), SpO2 99 %, not currently breastfeeding  Imaging and other studies: I have personally reviewed pertinent reports  O2 Device: RA     Plan       Airway Clearance Plan: Incentive Spirometer, Discontinue Protocol     Resp Comments: (P) pt assessed per ACP  pt SpO2 on RA 97-99%, with strong dry npc, no pulm history, no home txs  Able to achieve 1000mL on IS on 1st attempt, still with dry npc after performing IS  Plan to continue IS, but DC txs and protocol

## 2021-10-03 NOTE — PROGRESS NOTES
SLIM AP notified that IV site is not giving blood return, therefore is not an option to obtain AM labs    Will discuss with day team

## 2021-10-03 NOTE — PROGRESS NOTES
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 in October 2016, status post nephrostomy tube placement 05/11/2021 and status postleft percutaneous/retrograde percutaneous nephrostomy placement on 09/20/2021, obesity and CKD stage 5 who was admitted for elective right brachial basilic fistula superficialization with transposition on 09/30/2021  Nephrology has been consulted for evaluation and management of CKD stage 5      · CKD stage 5:  - at risk for acute kidney injury secondary to underlying comorbidities plus failure to regain presence of hemodynamic perturbations intraoperatively as well as due to ischemic injury from anemia  - After review of records In [facility] as well as Care everywhere it appears that the patient has a baseline Creatinine of 3-3 5 mg/dL  - patient was admitted with a creatinine of 3 11 mg/dL on 10/01/2021   - patient's creatinine yesterday is at 3 26 mg/dL, stable and at baseline  No labs from today thus far  - patient was status post AV fistula initial creation on 01/05/2021 by Dr Doctor  - status post fistulogram with high-grade stenosis  Status post angioplasty on 02/10/2021  - is status post transposition on 09/30/2021   - check BMP on 10/04/2021  - Optimize hemodynamic status to avoid delay in renal recovery  - Avoid nephrotoxins, adjust meds to appropriate GFR   - Strict I/O   - Daily weights  - Urinary retention protocol if patient does not have a [place]  - Most likely has underlying CKD secondary to prior episodes of acute kidney injury  Plus obstructive uropathy    Functional solitary right kidney  - will need to set up patient for follow up with Nephrology as an outpatient post hospitalization   - as an outpatient for nephrology, patient follows up with Dr Nona Mireles  - stable for discharge from renal standpoint when medically cleared and to follow-up with outpatient nephrologist on 10/13/2021 she already has an appointment scheduled     · Blood pressure/hypertension:  - home medications:  Metoprolol 25 mg p o  B i d   - current medications:  Metoprolol 25 mg p o  B i d   - recommendations:  No changes for now  - Optimize hemodynamics   - Maintain MAP > 65mmHg  - Avoid BP fluctuations      · H/H/anemia:  - most recent hemoglobin at  7 0 grams/deciliter  - maintain hemoglobin greater than 8 grams/deciliter  - PRBC transfusion as per primary team  - patient gets JENELLE as per Hematology as an outpatient     · Acid-base electrolytes:  ? Electrolytes:    § Stable  ? Acid-base:    § Metabolic acidosis most recent bicarb at 18    continue home sodium bicarb 650 mg p o  B i d      · Volume status:  ?  Clinically euvolemic to minimally hypervolemic  If increased shortness of breath may consider giving Bumex 2 mg IV x1     · Proteinuria:   ? Most recent UA with greater than 300 protein as of July 2021     · Other medical problems:  ? Secondary hyperparathyroidism renal origin:  Management per primary team   Calcitriol 0 25 mcg p o  Q day  ? History of nonfunctional bladder causing bilateral hydronephrosis:  Management per primary team   Follow-up with Urology  Status post nephrostomy tube placement 05/11/2021  ? Polycythemia vera/history of prior PE:  Was on Eliquis  JENELLE per Hematology-Oncology  ? Concern for increased bleeding from nephrostomy tube: With left percutaneous/retrograde percutaneous nephrostomy placement on 09/20/2021 Was on Eliquis 2 5 mg p o  B i d, currently on hold     Management primary team   Follow-up Heme-Onc consult    Patient reports she is going to follow up with IR as an outpatient for this  ? Fevers:   resolved  Thought to be likely secondary to transfusion  Management primary team     Thanks for the consult  Will continue to follow  Please call with questions/ concerns  Above-mentioned orders and Plan in terms of CKD was discussed with the team in [address] MD, SHAMA, 10/3/2021, 5:59 AM              Objective :   Patient seen and examined in her room no overnight events hemodynamically stable remains afebrile disappointed she needs another unit of blood as her hemoglobin has dropped again  Was seen by Hematology  No overt uremic symptoms urine output close to 625 cc  PHYSICAL EXAM  /65 (BP Location: Left arm)   Pulse 75   Temp 98 7 °F (37 1 °C) (Oral)   Resp 17   Ht 5' (1 524 m)   Wt 83 9 kg (185 lb)   SpO2 98%   BMI 36 13 kg/m²   Temp (24hrs), Av 6 °F (37 °C), Min:98 4 °F (36 9 °C), Max:98 7 °F (37 1 °C)        Intake/Output Summary (Last 24 hours) at 10/3/2021 0559  Last data filed at 10/2/2021 1900  Gross per 24 hour   Intake 225 ml   Output 300 ml   Net -75 ml       I/O last 24 hours: In: 525 [P O :225; Blood:300]  Out: 650 [Urine:650]      Current Weight: Weight - Scale: 83 9 kg (185 lb)  First Weight: Weight - Scale: 83 9 kg (185 lb)  Physical Exam  Vitals and nursing note reviewed  Constitutional:       General: She is not in acute distress  Appearance: Normal appearance  She is obese  She is ill-appearing  She is not toxic-appearing or diaphoretic  HENT:      Head: Normocephalic and atraumatic  Mouth/Throat:      Mouth: Mucous membranes are moist       Pharynx: Oropharynx is clear  Eyes:      General: No scleral icterus  Conjunctiva/sclera: Conjunctivae normal    Cardiovascular:      Rate and Rhythm: Normal rate  Heart sounds: Normal heart sounds  No friction rub  Pulmonary:      Effort: Pulmonary effort is normal  No respiratory distress  Breath sounds: Normal breath sounds  No stridor  No wheezing  Abdominal:      General: There is no distension  Palpations: Abdomen is soft  There is no mass  Tenderness: There is no abdominal tenderness  Comments: Nephrostomy   Musculoskeletal:         General: No swelling  Cervical back: Normal range of motion and neck supple  Comments: Trace edema lower extremities, +1 edema right upper extremity, right arm AV fistula with thrill and bruit   Skin:     General: Skin is warm  Coloration: Skin is not jaundiced  Neurological:      General: No focal deficit present  Mental Status: She is alert and oriented to person, place, and time  Psychiatric:         Mood and Affect: Mood normal          Behavior: Behavior normal              Review of Systems   Constitutional: Positive for fatigue  Negative for chills  HENT: Negative for congestion  Respiratory: Negative for cough and wheezing  Cardiovascular: Negative for leg swelling  Gastrointestinal: Negative for abdominal pain, constipation, diarrhea, nausea and vomiting  Genitourinary: Positive for hematuria  Negative for difficulty urinating and dysuria  Musculoskeletal: Negative for back pain  Neurological: Negative for dizziness and headaches  Psychiatric/Behavioral: Negative for agitation and confusion  Frustrated   All other systems reviewed and are negative        Scheduled Meds:  Current Facility-Administered Medications   Medication Dose Route Frequency Provider Last Rate    acetaminophen  650 mg Oral Q6H PRN Kim Esteban MD      atorvastatin  40 mg Oral HS Kim Esteban MD      calcitriol  0 25 mcg Oral Daily Kim Esteban MD      citalopram  20 mg Oral Daily Kim Esteban MD      heparin (porcine)  5,000 Units Subcutaneous [facility] Hayley Oseguera MD      levothyroxine  75 mcg Oral Early Morning Kim Esteban MD      melatonin  3 mg Oral HS Kim Esteban MD      metoprolol tartrate  25 mg Oral BID Kim Esteban MD      oxyCODONE  2 5 mg Oral Q4H PRN Corinne Lackey MD      oxyCODONE  5 mg Oral Q4H PRN Corinne Lackey MD      sodium bicarbonate  650 mg Oral BID after meals Rylan Juan MD         PRN Meds:   acetaminophen    oxyCODONE    oxyCODONE    Continuous Infusions:       Invasive Devices: Invasive Devices     Peripheral Intravenous Line            Peripheral IV 10/03/21 Left;Upper;Ventral (anterior) Arm <1 day          Line            Hemodialysis AV Fistula Right Upper arm -- days    Hemodialysis AV Fistula 09/30/21 3 days          Drain            Percutaneous Nephroureteral Tube (PCNU) -- days    Urostomy Ileal conduit RUQ 1824 days    Percutaneous Nephroureteral Tube (PCNU) Left 1 10 Fr 40 Cm 16 days    Nephrostomy Left 2 8 Fr  12 days                  LABORATORY:    Results from last 7 days   Lab Units 10/03/21  0008 10/02/21  0646 10/01/21  1307 09/29/21  1233   WBC Thousand/uL  --  4 56 6 12 4 87   HEMOGLOBIN g/dL 7 0* 6 7* 7 0* 7 6*   HEMATOCRIT % 22 3* 21 9* 23 6* 24 7*   PLATELETS Thousands/uL  --  200 256 266   POTASSIUM mmol/L  --  3 9 4 2  --    CHLORIDE mmol/L  --  113* 112*  --    CO2 mmol/L  --  18* 21  --    BUN mg/dL  --  31* 28*  --    CREATININE mg/dL  --  3 26* 3 11*  --    CALCIUM mg/dL  --  8 1* 8 3  --    MAGNESIUM mg/dL  --  1 9  --   --    PHOSPHORUS mg/dL  --  3 4  --   --       rest all reviewed    RADIOLOGY:  No orders to display     Rest all reviewed    Portions of the record may have been created with voice recognition software  Occasional wrong word or "sound a like" substitutions may have occurred due to the inherent limitations of voice recognition software  Read the chart carefully and recognize, using context, where substitutions have occurred  If you have any questions, please contact the dictating provider

## 2021-10-03 NOTE — ASSESSMENT & PLAN NOTE
Lab Results   Component Value Date    EGFR 13 10/02/2021    EGFR 14 10/01/2021    EGFR 12 09/10/2021    CREATININE 3 26 (H) 10/02/2021    CREATININE 3 11 (H) 10/01/2021    CREATININE 3 42 (H) 09/10/2021     · Patient is a 78 yo F w/ progressing CKD, s/p creation R brachiobasilic fistula now s/p superficialization with transposition 9/30  · Initially admitted for OP elective surgery but found to have significant anemia  · Admitted under Vascular surgery   · SLIM consulted for medical management and transfer care over under [facility]  · Nephrology consult appreciated, does not need urgent dialysis for now

## 2021-10-03 NOTE — PLAN OF CARE
Problem: Prexisting or High Potential for Compromised Skin Integrity  Goal: Skin integrity is maintained or improved  Description: INTERVENTIONS:  - Identify patients at risk for skin breakdown  - Assess and monitor skin integrity  - Assess and monitor nutrition and hydration status  - Monitor labs   - Assess for incontinence   - Turn and reposition patient  - Assist with mobility/ambulation  - Relieve pressure over bony prominences  - Avoid friction and shearing  - Provide appropriate hygiene as needed including keeping skin clean and dry  - Evaluate need for skin moisturizer/barrier cream  - Collaborate with interdisciplinary team   - Patient/family teaching  - Consider wound care consult   Outcome: Progressing          Problem: PAIN - ADULT  Goal: Verbalizes/displays adequate comfort level or baseline comfort level  Description: Interventions:  - Encourage patient to monitor pain and request assistance  - Assess pain using appropriate pain scale  - Administer analgesics based on type and severity of pain and evaluate response  - Implement non-pharmacological measures as appropriate and evaluate response  - Consider cultural and social influences on pain and pain management  - Notify physician/advanced practitioner if interventions unsuccessful or patient reports new pain  Outcome: Progressing     Problem: INFECTION - ADULT  Goal: Absence or prevention of progression during hospitalization  Description: INTERVENTIONS:  - Assess and monitor for signs and symptoms of infection  - Monitor lab/diagnostic results  - Monitor all insertion sites, i e  indwelling lines, tubes, and drains  - Monitor endotracheal if appropriate and nasal secretions for changes in amount and color  - [place] appropriate cooling/warming therapies per order  - Administer medications as ordered  - Instruct and encourage patient and family to use good hand hygiene technique  - Identify and instruct in appropriate isolation precautions for identified infection/condition  Outcome: Progressing     Problem: SAFETY ADULT  Goal: Patient will remain free of falls  Description: INTERVENTIONS:  - Educate patient/family on patient safety including physical limitations  - Instruct patient to call for assistance with activity   - Consult OT/PT to assist with strengthening/mobility   - Keep Call bell within reach  - Keep bed low and locked with side rails adjusted as appropriate  - Keep care items and personal belongings within reach  - Initiate and maintain comfort rounds  - Make Fall Risk Sign visible to staff  - Offer Toileting every 2 Hours, in advance of need  - Obtain necessary fall risk management equipment  - Apply yellow socks and bracelet for high fall risk patients  - Consider moving patient to room near nurses station  Outcome: Progressing  Goal: Maintain or return to baseline ADL function  Description: INTERVENTIONS:  -  Assess patient's ability to carry out ADLs; assess patient's baseline for ADL function and identify physical deficits which impact ability to perform ADLs (bathing, care of mouth/teeth, toileting, grooming, dressing, etc )  - Assess/evaluate cause of self-care deficits   - Assess range of motion  - Assess patient's mobility; develop plan if impaired  - Assess patient's need for assistive devices and provide as appropriate  - Encourage maximum independence but intervene and supervise when necessary  - Involve family in performance of ADLs  - Assess for home care needs following discharge   - Consider OT consult to assist with ADL evaluation and planning for discharge  - Provide patient education as appropriate  Outcome: Progressing       Problem: DISCHARGE PLANNING  Goal: Discharge to home or other facility with appropriate resources  Description: INTERVENTIONS:  - Identify barriers to discharge w/patient and caregiver  - Arrange for needed discharge resources and transportation as appropriate  - Identify discharge learning needs (meds, wound care, etc )  - Arrange for interpretive services to assist at discharge as needed  - Refer to Case Management Department for coordinating discharge planning if the patient needs post-hospital services based on physician/advanced practitioner order or complex needs related to functional status, cognitive ability, or social support system  Outcome: Progressing     Problem: Knowledge Deficit  Goal: Patient/family/caregiver demonstrates understanding of disease process, treatment plan, medications, and discharge instructions  Description: Complete learning assessment and assess knowledge base    Interventions:  - Provide teaching at level of understanding  - Provide teaching via preferred learning methods  Outcome: Progressing     Problem: MOBILITY - ADULT  Goal: Maintain or return to baseline ADL function  Description: INTERVENTIONS:  -  Assess patient's ability to carry out ADLs; assess patient's baseline for ADL function and identify physical deficits which impact ability to perform ADLs (bathing, care of mouth/teeth, toileting, grooming, dressing, etc )  - Assess/evaluate cause of self-care deficits   - Assess range of motion  - Assess patient's mobility; develop plan if impaired  - Assess patient's need for assistive devices and provide as appropriate  - Encourage maximum independence but intervene and supervise when necessary  - Involve family in performance of ADLs  - Assess for home care needs following discharge   - Consider OT consult to assist with ADL evaluation and planning for discharge  - Provide patient education as appropriate  Outcome: Progressing

## 2021-10-03 NOTE — ASSESSMENT & PLAN NOTE
Significant anemia noted secondary to chronic blood loss from ostomy bag  Hold SHERLY  Heme Onc consult appreciated, lana to yov[facility]

## 2021-10-03 NOTE — PROGRESS NOTES
Progress Note - Vascular Surgery   Ralph Drake 77 y o  female MRN: [id number]  Unit/Bed#: [facility] 322-01 Encounter: [id number]    Assessment:  Patient is a 76 yo F w/ progressing CKD, s/p creation R brachiobasilic fistula now s/p superficialization with transposition 9/30  Good thrill, palpable radial pulse  Plan:  Continue pain control  Continue renal diet  AM Hb pending  Continue medical management  Healing well from vascular surgery standpoint, will follow up outpatient for post-op check     Subjective/Objective     Subjective:   S/p 1U PRBC yesterday for Hb of 6 7  Post-transfusion Hb 7 0  Afebrile  No parasthesias or weakness right arm  No chest pain or SOB  Objective:    Blood pressure 130/65, pulse 75, temperature 98 7 °F (37 1 °C), temperature source Oral, resp  rate 17, height 5' (1 524 m), weight 83 9 kg (185 lb), SpO2 98 %, not currently breastfeeding  ,Body mass index is 36 13 kg/m²        Intake/Output Summary (Last 24 hours) at 10/3/2021 0723  Last data filed at 10/3/2021 4167  Gross per 24 hour   Intake 225 ml   Output 625 ml   Net -400 ml       Invasive Devices     Peripheral Intravenous Line            Peripheral IV 10/03/21 Left;Upper;Ventral (anterior) Arm <1 day          Line            Hemodialysis AV Fistula Right Upper arm -- days    Hemodialysis AV Fistula 09/30/21 3 days          Drain            Percutaneous Nephroureteral Tube (PCNU) -- days    Urostomy Ileal conduit RUQ 1824 days    Percutaneous Nephroureteral Tube (PCNU) Left 1 10 Fr 40 Cm 16 days    Nephrostomy Left 2 8 Fr  12 days                Physical Exam:   Gen:  NAD  CV:  warm, well-perfused  Lungs: nl effort  Abd:  soft, NT/ND  Ext:  Ecchymosis around incision, good thrill on fistula, palpable radial pulse, motor and sensory intact  Neuro: A&Ox3     Results from last 7 days   Lab Units 10/03/21  0008 10/02/21  0646 10/01/21  1307 09/29/21  1233   WBC Thousand/uL  --  4 56 6 12 4 87   HEMOGLOBIN g/dL 7 0* 6 7* 7 0* 7 6* HEMATOCRIT % 22 3* 21 9* 23 6* 24 7*   PLATELETS Thousands/uL  --  200 256 266     Results from last 7 days   Lab Units 10/02/21  0646 10/01/21  1307   POTASSIUM mmol/L 3 9 4 2   CHLORIDE mmol/L 113* 112*   CO2 mmol/L 18* 21   BUN mg/dL 31* 28*   CREATININE mg/dL 3 26* 3 11*   CALCIUM mg/dL 8 1* 8 3     Results from last 7 days   Lab Units 09/30/21  1206   INR  1 12

## 2021-10-03 NOTE — PROGRESS NOTES
[facility]  Progress Note - Bernarda Moody [birth year], 77 y o  female MRN: [id number]  Unit/Bed#: [facility] 322-01 Encounter: [id number]  Primary Care Provider: Ale Bello MD   Date and time admitted to hospital: 9/30/2021 11:04 AM    * Stage 5 chronic kidney disease not on chronic dialysis [facility])  Assessment & Plan  Lab Results   Component Value Date    EGFR 13 10/02/2021    EGFR 14 10/01/2021    EGFR 12 09/10/2021    CREATININE 3 26 (H) 10/02/2021    CREATININE 3 11 (H) 10/01/2021    CREATININE 3 42 (H) 09/10/2021     · Patient is a 78 yo F w/ progressing CKD, s/p creation R brachiobasilic fistula now s/p superficialization with transposition 9/30  · Initially admitted for OP elective surgery but found to have significant anemia  · Admitted under Vascular surgery   · SLIM consulted for medical management and transfer care over under Rosey Choudhary  · Nephrology consult appreciated, does not need urgent dialysis for now  Poor venous access  Assessment & Plan  Patient has poor venous access, multiple attempts were made for obtaining IV access  Unfortunately patient is not candidate for PICC line due to advanced CKD  Will discuss with Critical Care for central line placement    Iron deficiency anemia due to chronic blood loss  Assessment & Plan  So far patient received 2 units PRBC  Hold jakafi  Iron panel shows elevated stored iron  Hemoglobin remains at 7  Will transfuse 1 unit PRBC today after obtaining central line    Polycythemia vera (HCC)  Assessment & Plan  Significant anemia noted secondary to chronic blood loss from ostomy bag  Hold JAKAFI  Heme Onc consult appreciated, okay to hold Jakafi       Obstructive uropathy  Assessment & Plan  · Chronic bilateral hydronephrosis with stent placement  · underwent conversion to retrograde nephroureterostomy tube     · Tea colored urine in ostomy bag since left PCN placement 9/20/21  · IR consulted for left pcn removal, now scheduled for 10/5  · In 10/2020 Hydronephrosis and hematuria 2021  S/P Nephrostomy tube placement 21  Chronic saddle pulmonary embolism (HCC)  Assessment & Plan  On Eliquis 2 5 mg BID at home, currently on hold by primary team         VTE Pharmacologic Prophylaxis:   Pharmacologic: Heparin  Mechanical VTE Prophylaxis in Place: Yes    Patient Centered Rounds: I have performed bedside rounds with nursing staff today  Current Length of Stay: 2 day(s)    Current Patient Status: Inpatient   Certification Statement: The patient will continue to require additional inpatient hospital stay due to Anemia    Discharge Plan:  Pending progress    Code Status: Level 1 - Full Code      Subjective:   No overnight events  However still does not have IV access  Objective:     Vitals:   Temp (24hrs), Av 6 °F (37 °C), Min:98 4 °F (36 9 °C), Max:98 7 °F (37 1 °C)    Temp:  [98 4 °F (36 9 °C)-98 7 °F (37 1 °C)] 98 7 °F (37 1 °C)  HR:  [71-78] 71  Resp:  [14-18] 14  BP: (113-139)/(58-72) 126/65  SpO2:  [97 %-99 %] 97 %  Body mass index is 36 13 kg/m²  Input and Output Summary (last 24 hours): Intake/Output Summary (Last 24 hours) at 10/3/2021 1258  Last data filed at 10/3/2021 1103  Gross per 24 hour   Intake 405 ml   Output 825 ml   Net -420 ml       Physical Exam:     Physical Exam  Constitutional:       Appearance: She is well-developed  HENT:      Head: Normocephalic and atraumatic  Pulmonary:      Effort: Pulmonary effort is normal  No respiratory distress  Breath sounds: Normal breath sounds  Musculoskeletal:      Cervical back: Normal range of motion  Skin:     General: Skin is warm and dry  Comments: Multiple bruises noted on all extremities    Slight edema noted            Results from last 7 days   Lab Units 10/03/21  0008 10/02/21  0646 21  1233   WBC Thousand/uL  --  4 56 4 87   HEMOGLOBIN g/dL 7 0* 6 7* 7 6*   HEMATOCRIT % 22 3* 21 9* 24 7*   PLATELETS Thousands/uL  -- 200 266   NEUTROS PCT %  --   --  71   LYMPHS PCT %  --   --  14   MONOS PCT %  --   --  11   EOS PCT %  --   --  2     Results from last 7 days   Lab Units 10/02/21  0646   SODIUM mmol/L 139   POTASSIUM mmol/L 3 9   CHLORIDE mmol/L 113*   CO2 mmol/L 18*   BUN mg/dL 31*   CREATININE mg/dL 3 26*   ANION GAP mmol/L 8   CALCIUM mg/dL 8 1*   GLUCOSE RANDOM mg/dL 99     Results from last 7 days   Lab Units 09/30/21  1206   INR  1 12                      Recent Cultures (last 7 days):           Last 24 Hours Medication List:   Current Facility-Administered Medications   Medication Dose Route Frequency Provider Last Rate    acetaminophen  650 mg Oral Q6H PRN Alex Sutherland MD      atorvastatin  40 mg Oral HS Alex Sutherland MD      calcitriol  0 25 mcg Oral Daily Alex Sutherland MD      citalopram  20 mg Oral Daily Alex Sutherland MD      heparin (porcine)  5,000 Units Subcutaneous [facility] Elvie Pruitt MD      levothyroxine  75 mcg Oral Early Morning Alex Sutherland MD      melatonin  3 mg Oral HS Alex Sutherland MD      metoprolol tartrate  25 mg Oral BID Alex Sutherland MD      oxyCODONE  2 5 mg Oral Q4H PRN Alex Sutherland MD      oxyCODONE  5 mg Oral Q4H PRN Alex Sutherland MD      sodium bicarbonate  650 mg Oral BID after meals Rahul Salas MD          Today, Patient Was Seen By: Shey Linda MD    ** Please Note: Dictation voice to text software may have been used in the creation of this document   **

## 2021-10-03 NOTE — NURSING NOTE
Unsuccessful attempts to collect morning labs were made this morning  Isatu Field PA-C made aware of the unsuccessful attempts  Reached out to ICU for assistance since patient will require ultrasound guidance for collection  Spoke with [id number] ICU RN  [address] I stated he met some difficulties placing an IV on  10/2/2021  ICU team stated they would not be able to draw any labs  [address] I indicated that PICC team was also unsuccessful yesterday  GERRI notified and aware   SD 10/3/2021 [id number]

## 2021-10-03 NOTE — ASSESSMENT & PLAN NOTE
So far patient received 2 units PRBC  Hold jakafi  Iron panel shows elevated stored iron  Hemoglobin remains at 7  Will transfuse 1 unit PRBC today after obtaining central line

## 2021-10-03 NOTE — UTILIZATION REVIEW
Continued Stay Review    Date: 10/2/2021                         Current Patient Class: inpatient   Current Level of Care: med surg    HPI:76 y o  female initially admitted on 9/30/2021 to outpatient no charge bed and converted to inpatient on 10/1/2021 due to continued pain control and need for continued medical evaluation as patient has stage 5 CKD, not on dialysis; post operative anemia requiring transfusion  Procedure 9/30/2021 Superficialization and transposition of right brachiobasilic fistula  Findings -Very poor subcutaneous tissue quality   Large, well matured brachiobasilic fistula with several large branches; there is a relatively stenotic are about 2 cm from the anastomosis with vein wall thickening     Assessment/Plan:   10/1/2021 CHANGED TO INPATIENT:  Post op day 1 status post creation R brachiobasilic fistula now s/p superficialization with transposition 9/30  Patient has pain  Febrile  On exam incision has some ecchymosis  Good thrill on fistula  H&H 7/23 6    Continue pain control, transfuse unit PRBC    10/2/2021: Post op day 2 status post creation R brachiobasilic fistula now s/p superficialization with transposition 9/30  Patient has continued pain  On exam incision healing well   + thrill  Tea colored urine in ostomy bag since left PCN placement 9/20/21 H&H 6 7/21 9  Plan is continued pain control  PT/OT  Transfuse PRBC  Consult nephrology and medicine  Difficult access  picc team unable to insert IV  Sc heparin on hold  Per nephrology - Patient has CKD stage 5 and is at risk for LUANN  Baseline creatinine is 3 - 3 5  Avoid nephrotoxins, adjust meds to appropriate GFR  Maintain MAP > 65mmHg    10/2/2021 per medicine -  Patient has history of CKD stage 5, poor venous access  Iron deficiency anemia due to chronic blood loss/Polycythemia vera/chronic bilateral hydronephrosis with nephrostomy  Chronic PE     Patient not candidate for Picc, picc team to obtain IV access with [facility]  Transfuse PRBC  Consult heme  IR plans left pcn removal for 10/5   eliquis is on hold  10/2/2021 per heme - Patient has myeloproliferative disorder/JAK2 positive polycythemia vera  On Aranesp every 28 days  Keep hgb > 7  Hold Jakafi  Chronic normocytic anemia:  Multifactorial including renal failure, myeloproliferative disorder and anemia of chronic disease      Vital Signs:   10/02/21 1500  98 4 °F (36 9 °C)  74  15  134/72  98  97 %  None (Room air)  Lying   10/02/21 1400  98 6 °F (37 °C)  78  18  139/58  --  99 %  None (Room air)  --   10/02/21 1040  98 6 °F (37 °C)  72  18  115/64  --  --  --  --   10/02/21 0959  98 6 °F (37 °C)  80  16  122/65  --  --  --  --   10/02/21 0950  98 4 °F (36 9 °C)  82  16  122/65  92  96 %  None (Room air)  --   10/02/21 0900  98 6 °F (37 °C)  78  13  119/59  78  96 %  None (Room air)  Sitting   10/01/21 2130  99 1 °F (37 3 °C)  77  16  129/70  --  93 %  --  --   10/01/21 1821  100 7 °F (38 2 °C)Abnormal   83  20  113/57  --  92 %  --  --   10/01/21 1745  99 4 °F (37 4 °C)  83  15  94/50  66  94 %  None (Room air)  Sitting   10/01/21 1618  101 4 °F (38 6 °C)Abnormal   82  16  101/50  72  --  --  --   10/01/21 1530  101 3 °F (38 5 °C)Abnormal   86  18  104/52  72  96 %  None (Room air)           Pertinent Labs/Diagnostic Results:  No imaging   Results from last 7 days   Lab Units 10/03/21  0008 10/02/21  0646 10/01/21  1307 09/29/21  1233   WBC Thousand/uL  --  4 56 6 12 4 87   HEMOGLOBIN g/dL 7 0* 6 7* 7 0* 7 6*   HEMATOCRIT % 22 3* 21 9* 23 6* 24 7*   PLATELETS Thousands/uL  --  200 256 266   NEUTROS ABS Thousands/µL  --   --   --  3 48     Results from last 7 days   Lab Units 10/02/21  0646 10/01/21  1307   SODIUM mmol/L 139 138   POTASSIUM mmol/L 3 9 4 2   CHLORIDE mmol/L 113* 112*   CO2 mmol/L 18* 21   ANION GAP mmol/L 8 5   BUN mg/dL 31* 28*   CREATININE mg/dL 3 26* 3 11*   EGFR ml/min/1 73sq m 13 14   CALCIUM mg/dL 8 1* 8 3   MAGNESIUM mg/dL 1 9 --    PHOSPHORUS mg/dL 3 4  --      Results from last 7 days   Lab Units 10/02/21  0646 10/01/21  1307   GLUCOSE RANDOM mg/dL 99 143*     Results from last 7 days   Lab Units 09/30/21  1206   PROTIME seconds 14 0   INR  1 12     Results from last 7 days   Lab Units 10/02/21  0646   FERRITIN ng/mL 552*       Medications:   Scheduled Medications:  atorvastatin, 40 mg, Oral, HS  calcitriol, 0 25 mcg, Oral, Daily  citalopram, 20 mg, Oral, Daily  heparin (porcine), 5,000 Units, Subcutaneous, Q8H [address]  levothyroxine, 75 mcg, Oral, Early Morning  melatonin, 3 mg, Oral, HS  metoprolol tartrate, 25 mg, Oral, BID  sodium bicarbonate, 650 mg, Oral, BID after meals      Continuous IV Infusions:lactated ringers infusion   Rate: 125 mL/hr Dose: 125 mL/hr  Freq: Continuous Route: IV  Indications of Use: IV Hydration  Last Dose: 125 mL/hr (10/01/21 0242)  Start: 09/30/21 1230 End: 10/01/21 0727     PRN Meds:  acetaminophen, 650 mg, Oral, Q6H PRN - used x 1 10/1, 10/2  oxyCODONE, 2 5 mg, Oral, Q4H PRN - used x 1 9/30, x 1 10/1  oxyCODONE, 5 mg, Oral, Q4H PRN    10/1/2021 transfuse PRBC  10/2/2021 transfuse PRBC    Discharge Plan: to be determined     Network Utilization Review Department  ATTENTION: Please call with any questions or concerns to [phone] and carefully listen to the prompts so that you are directed to the right person  All voicemails are confidential   [facility] all requests for admission clinical reviews, approved or denied determinations and any other requests to dedicated fax number below belonging to the campus where the patient is receiving treatment   List of dedicated fax numbers for the Facilities:  [address] DENIALS (Administrative/Medical Necessity) [phone]   [address] (Maternity/NICU/Pediatrics) 261 [facility] [place] [address] [phone] 8049 [facility] [phone] [facility] [address]77 [address] [place] [address] [address] [place] [address] [phone]

## 2021-10-03 NOTE — ASSESSMENT & PLAN NOTE
Patient has poor venous access, multiple attempts were made for obtaining IV access  Unfortunately patient is not candidate for PICC line due to advanced CKD    Will discuss with Critical Care for central line placement

## 2021-10-04 ENCOUNTER — HOSPITAL ENCOUNTER (OUTPATIENT)
Dept: INFUSION CENTER | Facility: HOSPITAL | Age: 76
Discharge: HOME | End: 2021-10-04
Attending: INTERNAL MEDICINE

## 2021-10-04 ENCOUNTER — APPOINTMENT (INPATIENT)
Dept: RADIOLOGY | Facility: HOSPITAL | Age: 76
DRG: 981 | End: 2021-10-04
Payer: COMMERCIAL

## 2021-10-04 LAB
ANION GAP SERPL CALCULATED.3IONS-SCNC: 6 MMOL/L (ref 4–13)
BUN SERPL-MCNC: 36 MG/DL (ref 5–25)
CALCIUM SERPL-MCNC: 8.5 MG/DL (ref 8.3–10.1)
CHLORIDE SERPL-SCNC: 112 MMOL/L (ref 100–108)
CO2 SERPL-SCNC: 22 MMOL/L (ref 21–32)
CREAT SERPL-MCNC: 3.23 MG/DL (ref 0.6–1.3)
ERYTHROCYTE [DISTWIDTH] IN BLOOD BY AUTOMATED COUNT: 18 % (ref 11.6–15.1)
GFR SERPL CREATININE-BSD FRML MDRD: 13 ML/MIN/1.73SQ M
GLUCOSE SERPL-MCNC: 88 MG/DL (ref 65–140)
HCT VFR BLD AUTO: 22.6 % (ref 34.8–46.1)
HCT VFR BLD AUTO: 27.8 % (ref 34.8–46.1)
HGB BLD-MCNC: 7 G/DL (ref 11.5–15.4)
HGB BLD-MCNC: 8.7 G/DL (ref 11.5–15.4)
MCH RBC QN AUTO: 29.2 PG (ref 26.8–34.3)
MCHC RBC AUTO-ENTMCNC: 31 G/DL (ref 31.4–37.4)
MCV RBC AUTO: 94 FL (ref 82–98)
PLATELET # BLD AUTO: 179 THOUSANDS/UL (ref 149–390)
PMV BLD AUTO: 9.5 FL (ref 8.9–12.7)
POTASSIUM SERPL-SCNC: 4.2 MMOL/L (ref 3.5–5.3)
RBC # BLD AUTO: 2.4 MILLION/UL (ref 3.81–5.12)
SODIUM SERPL-SCNC: 140 MMOL/L (ref 136–145)
WBC # BLD AUTO: 5.38 THOUSAND/UL (ref 4.31–10.16)

## 2021-10-04 PROCEDURE — 97166 OT EVAL MOD COMPLEX 45 MIN: CPT

## 2021-10-04 PROCEDURE — C1769 GUIDE WIRE: HCPCS

## 2021-10-04 PROCEDURE — P9016 RBC LEUKOCYTES REDUCED: HCPCS

## 2021-10-04 PROCEDURE — 97163 PT EVAL HIGH COMPLEX 45 MIN: CPT

## 2021-10-04 PROCEDURE — 85018 HEMOGLOBIN: CPT | Performed by: FAMILY MEDICINE

## 2021-10-04 PROCEDURE — 50684 INJECTION FOR URETER X-RAY: CPT | Performed by: RADIOLOGY

## 2021-10-04 PROCEDURE — 50431 NJX PX NFROSGRM &/URTRGRM: CPT

## 2021-10-04 PROCEDURE — 50431 NJX PX NFROSGRM &/URTRGRM: CPT | Performed by: RADIOLOGY

## 2021-10-04 PROCEDURE — 85027 COMPLETE CBC AUTOMATED: CPT | Performed by: FAMILY MEDICINE

## 2021-10-04 PROCEDURE — 99232 SBSQ HOSP IP/OBS MODERATE 35: CPT | Performed by: INTERNAL MEDICINE

## 2021-10-04 PROCEDURE — 0T25X0Z CHANGE DRAINAGE DEVICE IN KIDNEY, EXTERNAL APPROACH: ICD-10-PCS | Performed by: RADIOLOGY

## 2021-10-04 PROCEDURE — 80048 BASIC METABOLIC PNL TOTAL CA: CPT | Performed by: INTERNAL MEDICINE

## 2021-10-04 PROCEDURE — 85014 HEMATOCRIT: CPT | Performed by: FAMILY MEDICINE

## 2021-10-04 PROCEDURE — 74425 UROGRAPHY ANTEGRADE RS&I: CPT | Performed by: RADIOLOGY

## 2021-10-04 PROCEDURE — 99233 SBSQ HOSP IP/OBS HIGH 50: CPT | Performed by: FAMILY MEDICINE

## 2021-10-04 RX ORDER — POLYETHYLENE GLYCOL 3350 17 G/17G
17 POWDER, FOR SOLUTION ORAL DAILY PRN
Status: DISCONTINUED | OUTPATIENT
Start: 2021-10-04 | End: 2021-10-05 | Stop reason: HOSPADM

## 2021-10-04 RX ORDER — DOCUSATE SODIUM 100 MG/1
100 CAPSULE, LIQUID FILLED ORAL 2 TIMES DAILY
Status: DISCONTINUED | OUTPATIENT
Start: 2021-10-04 | End: 2021-10-05 | Stop reason: HOSPADM

## 2021-10-04 RX ADMIN — HEPARIN SODIUM 5000 UNITS: 5000 INJECTION INTRAVENOUS; SUBCUTANEOUS at 14:01

## 2021-10-04 RX ADMIN — SODIUM BICARBONATE 650 MG TABLET 650 MG: at 08:18

## 2021-10-04 RX ADMIN — ACETAMINOPHEN 650 MG: 325 TABLET, FILM COATED ORAL at 17:48

## 2021-10-04 RX ADMIN — MELATONIN TAB 3 MG 3 MG: 3 TAB at 22:32

## 2021-10-04 RX ADMIN — ATORVASTATIN CALCIUM 40 MG: 40 TABLET, FILM COATED ORAL at 22:32

## 2021-10-04 RX ADMIN — METOPROLOL TARTRATE 25 MG: 25 TABLET, FILM COATED ORAL at 17:48

## 2021-10-04 RX ADMIN — METOPROLOL TARTRATE 25 MG: 25 TABLET, FILM COATED ORAL at 08:18

## 2021-10-04 RX ADMIN — HEPARIN SODIUM 5000 UNITS: 5000 INJECTION INTRAVENOUS; SUBCUTANEOUS at 05:17

## 2021-10-04 RX ADMIN — IOHEXOL 10 ML: 350 INJECTION, SOLUTION INTRAVENOUS at 16:51

## 2021-10-04 RX ADMIN — LEVOTHYROXINE SODIUM 75 MCG: 75 TABLET ORAL at 05:17

## 2021-10-04 RX ADMIN — CALCITRIOL CAPSULES 0.25 MCG 0.25 MCG: 0.25 CAPSULE ORAL at 08:18

## 2021-10-04 RX ADMIN — SODIUM BICARBONATE 650 MG TABLET 650 MG: at 17:48

## 2021-10-04 RX ADMIN — CITALOPRAM HYDROBROMIDE 20 MG: 20 TABLET ORAL at 08:18

## 2021-10-04 RX ADMIN — DOCUSATE SODIUM 100 MG: 100 CAPSULE ORAL at 17:48

## 2021-10-04 NOTE — ASSESSMENT & PLAN NOTE
Patient has poor venous access, multiple attempts were made for obtaining IV access  Unfortunately patient is not candidate for PICC line due to advanced CKD    Central line placed yesterday due to no access and needing transfusion

## 2021-10-04 NOTE — ASSESSMENT & PLAN NOTE
Lab Results   Component Value Date    EGFR 13 10/04/2021    EGFR 14 10/03/2021    EGFR 13 10/02/2021    CREATININE 3 23 (H) 10/04/2021    CREATININE 3 10 (H) 10/03/2021    CREATININE 3 26 (H) 10/02/2021     · Patient is a 78 yo F w/ progressing CKD, s/p creation R brachiobasilic fistula now s/p superficialization with transposition 9/30  · Initially admitted for OP elective surgery but found to have significant anemia  · Admitted under Vascular surgery   · [facility] consulted for medical management and transfer care over under [facility]  · Nephrology consult appreciated, does not need urgent dialysis for now

## 2021-10-04 NOTE — BRIEF OP NOTE (RAD/CATH)
INTERVENTIONAL RADIOLOGY PROCEDURE NOTE    Date: 10/4/2021    Procedure: Left PCN and retrograde tube checks    Preoperative diagnosis:   1  Stage 5 chronic kidney disease not on chronic dialysis ([facility] )    2  Hydronephrosis of left kidney    3  Anemia in stage 5 chronic kidney disease, not on chronic dialysis ([facility] [address] )    4  [facility]a [facility]    5  Poor venous access         Postoperative diagnosis: Same  Surgeon: Alexander Sorensen MD     Assistant: None  No qualified resident was available  Blood loss: Minimal    Specimens: None     Findings: Left PCN check and removal  Left retrograde PCNU tube check  Small amount of blood seen in left retro  Tube  Please trend H and H and get CT if any concern for bleeding  Complications: None immediate      Anesthesia: none

## 2021-10-04 NOTE — ASSESSMENT & PLAN NOTE
Significant anemia noted secondary to chronic blood loss from ostomy bag  Hold SHERLY  Heme Onc consult appreciated, lana to hold Walter Casillas on discharge

## 2021-10-04 NOTE — DISCHARGE INSTRUCTIONS
Nephrostomy Tube Care     WHAT YOU NEED TO KNOW:   A nephrostomy tube is a catheter (thin plastic tube) that is inserted through your skin and into your kidney  The nephrostomy tube drains urine from your kidney into a collecting bag outside your body  You may need a nephrostomy tube when something is blocking the normal flow of urine  A nephrostomy tube may be used for a short or a long period of time  The nephrostomy tube comes out of your back, so you will need someone to help care for your nephrostomy tube  DISCHARGE INSTRUCTIONS:      How to clean the skin around the nephrostomy tube and change the bandage:  Since the nephrostomy tube comes out of your back, you will not be able to care for it by yourself  Ask someone to follow the general directions below to check and care for your nephrostomy tube  Gather the items you will need  Disposable (single use) under-pad, and a clean washcloth  ¨ Plain soap, warm water, and new medical gloves  ¨ Sterile gauze bandages  ¨ Clear adhesive dressing or medical tape  ¨ Skin barrier  ¨ Protective skin film  ¨ Trash bag  · Remove the old bandage, and check the tube entry site  ¨ Have the patient lie on his side with the nephrostomy tube entry site facing up  Place the under-pad where it will catch drainage as you are working with the nephrostomy tube  ¨ Wash your hands with soap and water  Put on new medical gloves  ¨ Gently remove the old bandage, without pulling on the tube  Do this by holding the skin beside the tube with one hand  With the other hand, gently remove sticky tape and the skin barrier by pulling in the same direction as hair growth  Do not touch the side of the bandage that is placed over or around the tube  Throw the bandage and skin barrier away in a trash bag  ¨ Look for signs of infection, such as skin redness and swelling  Report any skin changes to healthcare providers  ¨ Clean the tube entry site      ¨ Hold the tube in place to keep it from being pulled out while you are cleaning around it  ¨ You will need to clean the area twice  For the first cleaning, wet a new gauze bandage with soap and water  Begin at the entry site of the tube  Wipe the skin in circles, moving away from the entry site  Remove blood and any other material with the gauze  Do this as often as needed  Use a new gauze bandage each time you clean the area, moving away from the entry site  ¨ For the second cleaning, wet a new gauze bandage with water  Begin at the entry site of the tube  Wipe the skin in circles, moving away from the entry site  Use a new gauze bandage each time you clean the area, moving away from the entry site  ¨ Gently pat the skin with a clean washcloth to dry it  · Apply the skin barrier and bandages  ¨ Roll up a bandage to make it thick, and place it under  the place where the tube enters the skin  Place it to support the tube, and stop it from kinking or bending  Tape the bandage in place, and apply more bandages if directed by a healthcare provider  ¨ Bring the tubing forward to the front and tape it to the skin  Do not stretch the tube tight, because this may pull the nephrostomy tube out  How often to change the bandage  Change the bandage around the tube, every other day  If your bandages  get dirty or wet, change them right away, and as often as needed  If your nephrostomy tube is to be used for a long period of time, the tube needs to be changed every 2 to 3 months  Healthcare providers will tell you when you need to make an appointment to have your tube changed  How to care for the urine drainage bag:   · Ask if you need to measure and write down how much urine is in the bag before you empty it  Drain urine out of the drainage bag when it is ½ to ? full  Open the spout at the bottom of the bag to empty the urine into the toilet  · You may need to detach the drainage bag from the nephrostomy tube to change it    If so, attach a new drainage bag tightly to the nephrostomy tube  ·   How to prevent problems with your nephrostomy tube:   · Change bandages, directed  This helps to prevent infection  Throw away or clean your drainage bag as directed by your healthcare provider  · Wipe the connecting ends of the drainage bag with alcohol before you reconnect the bag to the tube  This helps prevent infection  Keep the tube taped to your skin and connected to a drainage bag placed below the level of your kidneys  This helps prevent urine from backing up into your kidneys  You may wear a small drainage bag strapped to your leg to let you move around more easily  · Check the catheter to be sure it is in place after you change your clothes or do other activities  Do not wear tight clothing over the tube  Place the tubing over your thigh rather than under it when you are sitting down  Be sure that nothing is pulling on the nephrostomy tube when you move around  · Change positions if you see little or no urine in your drainage bag  Check to see if the urine tube is twisted or bent  Be sure that you are not sitting or lying on the tube  If there are no kinks and there is little or no urine in the drainage bag, tell your healthcare provider  · Flush out the tube as directed  Some tubes get flushed one time a day with 10 mls of NSS You will be given a prescription for the flushes  To flush the nephrostomy tube, clean both connections with alcohol swap  Twist off the drainage bag tube and twist the saline syringe into the nephrostomy tube and flush briskly  Remove the syringe and twist the drainage bag tube back into the nephrostomy tube  · Keep the site covered while you shower  Tape a piece of clear adhesive plastic over the dressing to keep it dry while you shower  Do not take tub baths      Contact Interventional Radiology at [phone] Bert PATIENTS: Contact Interventional Radiology at [phone]) Jayro Maurice PATIENTS: Contact Interventional Radiology at [phone]) if:  · The skin around the nephrostomy tube is red, swollen, itches, or has a rash  · You have a fever greater than 101 or chills  · You have lower back or hip pain  · There are changes in how your urine looks or smells  · You have little or no urine draining from the nephrostomy tube  · You have nausea and are vomiting  · The black amado on your tube has moved, or the tube is longer than when it was put in    · You have questions or concerns about your condition or care  · The nephrostomy tube comes out completely  · There is blood, pus, or a bad smell coming from the place where the tube enters your skin  · Urine is leaking around the tube  The following pharmacies carry the flush syringes  [facility]                     [facility]       [address]  Phone [phone]            Phone [phone]  [address] & [facility]                      [address]                                 [phone]  Phone [phone]            Phone [phone]    [facility]                                                                         [facility] [phone]  [address]   Phone [phone]        Nephrostomy Tube Care     WHAT YOU NEED TO KNOW:   A nephrostomy tube is a catheter (thin plastic tube) that is inserted through your skin and into your kidney  The nephrostomy tube drains urine from your kidney into a collecting bag outside your body  You may need a nephrostomy tube when something is blocking the normal flow of urine  A nephrostomy tube may be used for a short or a long period of time  The nephrostomy tube comes out of your back, so you will need someone to help care for your nephrostomy tube  DISCHARGE INSTRUCTIONS:      How to clean the skin around the nephrostomy tube and change the bandage:  Since the nephrostomy tube comes out of your back, you will not be able to care for it by yourself  Ask someone to follow the general directions below to check and care for your nephrostomy tube  Gather the items you will need  Disposable (single use) under-pad, and a clean washcloth  ¨ Plain soap, warm water, and new medical gloves  ¨ Sterile gauze bandages  ¨ Clear adhesive dressing or medical tape  ¨ Skin barrier  ¨ Protective skin film  ¨ Trash bag  · Remove the old bandage, and check the tube entry site  ¨ Have the patient lie on his side with the nephrostomy tube entry site facing up  Place the under-pad where it will catch drainage as you are working with the nephrostomy tube  ¨ Wash your hands with soap and water  Put on new medical gloves  ¨ Gently remove the old bandage, without pulling on the tube  Do this by holding the skin beside the tube with one hand  With the other hand, gently remove sticky tape and the skin barrier by pulling in the same direction as hair growth  Do not touch the side of the bandage that is placed over or around the tube  Throw the bandage and skin barrier away in a trash bag  ¨ Look for signs of infection, such as skin redness and swelling  Report any skin changes to healthcare providers  ¨ Clean the tube entry site  ¨ Hold the tube in place to keep it from being pulled out while you are cleaning around it  ¨ You will need to clean the area twice  For the first cleaning, wet a new gauze bandage with soap and water  Begin at the entry site of the tube   Wipe the skin in circles, moving away from the entry site  Remove blood and any other material with the gauze  Do this as often as needed  Use a new gauze bandage each time you clean the area, moving away from the entry site  ¨ For the second cleaning, wet a new gauze bandage with water  Begin at the entry site of the tube  Wipe the skin in circles, moving away from the entry site  Use a new gauze bandage each time you clean the area, moving away from the entry site  ¨ Gently pat the skin with a clean washcloth to dry it  · Apply the skin barrier and bandages  ¨ Roll up a bandage to make it thick, and place it under  the place where the tube enters the skin  Place it to support the tube, and stop it from kinking or bending  Tape the bandage in place, and apply more bandages if directed by a healthcare provider  ¨ Bring the tubing forward to the front and tape it to the skin  Do not stretch the tube tight, because this may pull the nephrostomy tube out  How often to change the bandage  Change the bandage around the tube, every other day  If your bandages  get dirty or wet, change them right away, and as often as needed  If your nephrostomy tube is to be used for a long period of time, the tube needs to be changed every 2 to 3 months  Healthcare providers will tell you when you need to make an appointment to have your tube changed  How to care for the urine drainage bag:   · Ask if you need to measure and write down how much urine is in the bag before you empty it  Drain urine out of the drainage bag when it is ½ to ? full  Open the spout at the bottom of the bag to empty the urine into the toilet  · You may need to detach the drainage bag from the nephrostomy tube to change it    If so, attach a new drainage bag tightly to the nephrostomy tube  ·   How to prevent problems with your nephrostomy tube:   · Change bandages, directed  This helps to prevent infection   Throw away or clean your drainage bag as directed by your healthcare provider  · Wipe the connecting ends of the drainage bag with alcohol before you reconnect the bag to the tube  This helps prevent infection  Keep the tube taped to your skin and connected to a drainage bag placed below the level of your kidneys  This helps prevent urine from backing up into your kidneys  You may wear a small drainage bag strapped to your leg to let you move around more easily  · Check the catheter to be sure it is in place after you change your clothes or do other activities  Do not wear tight clothing over the tube  Place the tubing over your thigh rather than under it when you are sitting down  Be sure that nothing is pulling on the nephrostomy tube when you move around  · Change positions if you see little or no urine in your drainage bag  Check to see if the urine tube is twisted or bent  Be sure that you are not sitting or lying on the tube  If there are no kinks and there is little or no urine in the drainage bag, tell your healthcare provider  · Flush out the tube as directed  Some tubes get flushed one time a day with 10 mls of NSS You will be given a prescription for the flushes  To flush the nephrostomy tube, clean both connections with alcohol swap  Twist off the drainage bag tube and twist the saline syringe into the nephrostomy tube and flush briskly  Remove the syringe and twist the drainage bag tube back into the nephrostomy tube  · Keep the site covered while you shower  Tape a piece of clear adhesive plastic over the dressing to keep it dry while you shower  Do not take tub baths  Contact Interventional Radiology at [phone] Bert PATIENTS: Contact Interventional Radiology at [phone]) Sobia Harden PATIENTS: Contact Interventional Radiology at [phone]) if:  · The skin around the nephrostomy tube is red, swollen, itches, or has a rash  · You have a fever greater than 101 or chills  · You have lower back or hip pain    · There are changes in how your urine looks or smells  · You have little or no urine draining from the nephrostomy tube  · You have nausea and are vomiting  · The black amado on your tube has moved, or the tube is longer than when it was put in    · You have questions or concerns about your condition or care  · The nephrostomy tube comes out completely  · There is blood, pus, or a bad smell coming from the place where the tube enters your skin  · Urine is leaking around the tube  The following pharmacies carry the flush syringes  [facility]       [address] [facility]                    [address] [facility]  Phone [phone]            Phone [phone]  [address] & [facility]                      [address]                                 [phone]  Phone [phone]            Phone [phone]    [facility]                                                                         [facility] [phone]  [address]  [facility]   Phone [phone]                DISCHARGE INSTRUCTIONS  DIALYSIS FISTULA SURGERY    Following discharge from the hospital, you may have some questions about your operation, your activities or your general condition  These instructions may answer some of your questions and help you adjust during the first few weeks following your operation  ACTIVITY:  Limit use of the operated arm to what is absolutely necessary for the first day after surgery   On the second day after surgery, you may start to increase use of your arm as tolerated  One week after surgery, you should start to exercise your hand on the side of the graft by squeezing a ball  This increases blood flow in your graft and arm so your graft will function better  DIET:   Resume your normal diet  Try to eat low fat and low cholesterol foods  DRESSING:   The dressing may be removed on the third day following surgery  INCISION:   You may include the operated area in a shower on the fourth day following surgery  It is normal to have some pain at the surgical site  You will receive a prescription for pain medicine at the time of discharge  There will also be some swelling and bruising around the surgical site  If increasing redness or pain develops at the incision or severe pain, numbness or weakness occurs in the hand, call our office immediately  Numbness in the region of the incision may occur following the surgery  This normally resolves in six to twelve months  ARM SWELLING:    Most patients have some noticeable arm swelling after surgery  This usually disappears within a few weeks  If swelling is present, elevate the arm whenever possible  RESTRICTIONS:   Do not have blood draws, IVs, or blood pressures performed on the operated arm  FISTULA USE:    Your fistula will not be used for six to 12 weeks  PLEASE CALL THE OFFICE IF YOU HAVE ANY QUESTIONS    [phone] Aurora Kay [phone] [facility] FREE [phone]  [address] [place] [address] , [address], [place], [address]  600 [facility] 20, [address], [place], 210 [facility]  [address], [facility], [address]  611 [facility], [facility], [address], [facility], 5974 [facility]  [address], [address]st [address], [address]  [address], [address] [facility], [address] [facility] 830 [facility]  1201 [facility], 86[address] [facility], 960 [facility]  [facility], [address] [place] [address], 30 [facility] 6  201 [facility], [facility], [facility] [address], [place] [address]

## 2021-10-04 NOTE — PROGRESS NOTES
Discussed with Dr Shawna Soto  patient completed Left PCN check and removal  patient has blood seen in retrograde PCNU tube, and scant bleeding  Will trend hemoglobin every 6 hours and transfuse <7  If ongoing bleeding noted int he bag, will need Stat CT abd/pelvis

## 2021-10-04 NOTE — UTILIZATION REVIEW
Inpatient Admission Authorization Request   NOTIFICATION OF INPATIENT ADMISSION/INPATIENT AUTHORIZATION REQUEST   SERVICING FACILITY:   [facility]  Address: [address] [place] [address], [address] [place] [address]  Tax ID: [id number]  NPI: [id number]  Place of Service: Inpatient [address] [facility] Code: 24     ATTENDING PROVIDER:  Attending Name and NPI#: Jerry Gabe, Arcelia Daniel [[id number]]  Address: [address] [place] [address], [address] [place] [address]  Phone: [phone]     UTILIZATION REVIEW CONTACT:  [facility] Utilization   Network Utilization Review Department  Phone: [phone]  Fax: [phone]  Email: [email or web address]     PHYSICIAN ADVISORY SERVICES:  FOR [id number] REVIEW - MEDICAL NECESSITY DENIAL  Phone: [phone]  Fax: [phone]  Email: [email or web address]     TYPE OF REQUEST:  Inpatient Status     ADMISSION INFORMATION:  ADMISSION DATE/TIME: 10/1/21  4:06 PM  PATIENT DIAGNOSIS CODE/DESCRIPTION:  Stage 5 chronic kidney disease not on chronic dialysis ([facility] [address] ) [N18 5]  DISCHARGE DATE/TIME: No discharge date for patient encounter  DISCHARGE DISPOSITION (IF DISCHARGED): Non [facility] SNF/TCU/SNU     IMPORTANT INFORMATION:  Please contact the [facility] directly with any questions or concerns regarding this request  Department voicemails are confidential     Send requests for admission clinical reviews, concurrent reviews, approvals, and administrative denials due to lack of clinical to fax [phone]

## 2021-10-04 NOTE — SEDATION DOCUMENTATION
Left PCN Removed by Dr Charles Few  Retrograde PCNU ileal conduit  to left kidney intact  Urine is blood tinged  Dr Frank Fernando aware  Report to [address]

## 2021-10-04 NOTE — ASSESSMENT & PLAN NOTE
So far patient received 2 units PRBC  Hold jakafi  Iron panel shows elevated stored iron  Hemoglobin remains at 7  Patient had central line placed yesterday due to poor venous access and contraindication to picc  Will transfuse 1 unit PRBC today    Repeat H&H, if remains stable, may be discharged tomorrow with home VNA

## 2021-10-04 NOTE — ASSESSMENT & PLAN NOTE
· Chronic bilateral hydronephrosis with stent placement  · underwent conversion to retrograde nephroureterostomy tube  · Tea colored urine in ostomy bag since left PCN placement 9/20/21  · IR consulted for left pcn removal, now scheduled for 10/5  · In 10/2020 Hydronephrosis and hematuria 5/2021  S/P Nephrostomy tube placement 5/11/21       · Patient for tube check today by IR

## 2021-10-04 NOTE — PLAN OF CARE
Problem: Prexisting or High Potential for Compromised Skin Integrity  Goal: Skin integrity is maintained or improved  Description: INTERVENTIONS:  - Identify patients at risk for skin breakdown  - Assess and monitor skin integrity  - Assess and monitor nutrition and hydration status  - Monitor labs   - Assess for incontinence   - Turn and reposition patient  - Assist with mobility/ambulation  - Relieve pressure over bony prominences  - Avoid friction and shearing  - Provide appropriate hygiene as needed including keeping skin clean and dry  - Evaluate need for skin moisturizer/barrier cream  - Collaborate with interdisciplinary team   - Patient/family teaching  - Consider wound care consult   Outcome: Progressing     Problem: Potential for Falls  Goal: Patient will remain free of falls  Description: INTERVENTIONS:  - Educate patient/family on patient safety including physical limitations  - Instruct patient to call for assistance with activity   - Consult OT/PT to assist with strengthening/mobility   - Keep Call bell within reach  - Keep bed low and locked with side rails adjusted as appropriate  - Keep care items and personal belongings within reach  - Initiate and maintain comfort rounds  - Make Fall Risk Sign visible to staff  - Apply yellow socks and bracelet for high fall risk patients  - Consider moving patient to room near nurses station  Outcome: Progressing     Problem: PAIN - ADULT  Goal: Verbalizes/displays adequate comfort level or baseline comfort level  Description: Interventions:  - Encourage patient to monitor pain and request assistance  - Assess pain using appropriate pain scale  - Administer analgesics based on type and severity of pain and evaluate response  - Implement non-pharmacological measures as appropriate and evaluate response  - Consider cultural and social influences on pain and pain management  - Notify physician/advanced practitioner if interventions unsuccessful or patient reports new pain  Outcome: Progressing     Problem: INFECTION - ADULT  Goal: Absence or prevention of progression during hospitalization  Description: INTERVENTIONS:  - Assess and monitor for signs and symptoms of infection  - Monitor lab/diagnostic results  - Monitor all insertion sites, i e  indwelling lines, tubes, and drains  - Monitor endotracheal if appropriate and nasal secretions for changes in amount and color  - [place] appropriate cooling/warming therapies per order  - Administer medications as ordered  - Instruct and encourage patient and family to use good hand hygiene technique  - Identify and instruct in appropriate isolation precautions for identified infection/condition  Outcome: Progressing     Problem: SAFETY ADULT  Goal: Patient will remain free of falls  Description: INTERVENTIONS:  - Educate patient/family on patient safety including physical limitations  - Instruct patient to call for assistance with activity   - Consult OT/PT to assist with strengthening/mobility   - Keep Call bell within reach  - Keep bed low and locked with side rails adjusted as appropriate  - Keep care items and personal belongings within reach  - Initiate and maintain comfort rounds  - Make Fall Risk Sign visible to staff  - Apply yellow socks and bracelet for high fall risk patients  - Consider moving patient to room near nurses station  Outcome: Progressing  Goal: Maintain or return to baseline ADL function  Description: INTERVENTIONS:  -  Assess patient's ability to carry out ADLs; assess patient's baseline for ADL function and identify physical deficits which impact ability to perform ADLs (bathing, care of mouth/teeth, toileting, grooming, dressing, etc )  - Assess/evaluate cause of self-care deficits   - Assess range of motion  - Assess patient's mobility; develop plan if impaired  - Assess patient's need for assistive devices and provide as appropriate  - Encourage maximum independence but intervene and supervise when necessary  - Involve family in performance of ADLs  - Assess for home care needs following discharge   - Consider OT consult to assist with ADL evaluation and planning for discharge  - Provide patient education as appropriate  Outcome: Progressing  Goal: Maintains/Returns to pre admission functional level  Description: INTERVENTIONS:  - Perform BMAT or MOVE assessment daily    - Set and communicate daily mobility goal to care team and patient/family/caregiver  - Collaborate with rehabilitation services on mobility goals if consulted  - Out of bed for toileting  - Record patient progress and toleration of activity level   Outcome: Progressing     Problem: DISCHARGE PLANNING  Goal: Discharge to home or other facility with appropriate resources  Description: INTERVENTIONS:  - Identify barriers to discharge w/patient and caregiver  - Arrange for needed discharge resources and transportation as appropriate  - Identify discharge learning needs (meds, wound care, etc )  - Arrange for interpretive services to assist at discharge as needed  - Refer to Case Management Department for coordinating discharge planning if the patient needs post-hospital services based on physician/advanced practitioner order or complex needs related to functional status, cognitive ability, or social support system  Outcome: Progressing     Problem: Knowledge Deficit  Goal: Patient/family/caregiver demonstrates understanding of disease process, treatment plan, medications, and discharge instructions  Description: Complete learning assessment and assess knowledge base    Interventions:  - Provide teaching at level of understanding  - Provide teaching via preferred learning methods  Outcome: Progressing     Problem: MOBILITY - ADULT  Goal: Maintain or return to baseline ADL function  Description: INTERVENTIONS:  -  Assess patient's ability to carry out ADLs; assess patient's baseline for ADL function and identify physical deficits which impact ability to perform ADLs (bathing, care of mouth/teeth, toileting, grooming, dressing, etc )  - Assess/evaluate cause of self-care deficits   - Assess range of motion  - Assess patient's mobility; develop plan if impaired  - Assess patient's need for assistive devices and provide as appropriate  - Encourage maximum independence but intervene and supervise when necessary  - Involve family in performance of ADLs  - Assess for home care needs following discharge   - Consider OT consult to assist with ADL evaluation and planning for discharge  - Provide patient education as appropriate  Outcome: Progressing  Goal: Maintains/Returns to pre admission functional level  Description: INTERVENTIONS:  - Perform BMAT or MOVE assessment daily    - Set and communicate daily mobility goal to care team and patient/family/caregiver     - Collaborate with rehabilitation services on mobility goals if consulted  - Out of bed for toileting  - Record patient progress and toleration of activity level   Outcome: Progressing

## 2021-10-04 NOTE — PROGRESS NOTES
NEPHROLOGY PROGRESS NOTE   Gracie Esquivel 77 y o  female MRN: [id number]  Unit/Bed#: [facility] 322-01 Encounter: [id number]  Reason for Consult: LUANN CKD    ASSESSMENT AND PLAN:  77 y  o   female with pmh of hypertension, secondary hyperparathyroidism, anemia, history of bilateral hydronephrosis secondary to obstructive uropathy and nonfunctional bladder status post supratrigonal cystectomy and ileal conduit in October 2016, status post nephrostomy tube placement 05/11/2021 and status postleft percutaneous/retrograde percutaneous nephrostomy placement on 09/20/2021, obesity and CKD stage 5 who was admitted for elective right brachial basilic fistula superficialization with transposition on 09/30/2021  Nephrology has been consulted for evaluation and management of CKD stage 5  CKD stage 5, baseline creatinine 3 to 3 5, follows with Dr Rangel Signs  -creatinine overall stable at baseline 3 2 today  -CKD suspect secondary to obstructive nephropathy, prior episodes of LUANN  -no emergent indication for dialysis  -close outpatient Nephrology follow-up recommended    Access, currently has right upper extremity AV fistula initially created in January 2021, status post fistulogram with angioplasty earlier this year  Status post transposition on 9/30/21  Vascular surgery follow-up  CKD anemia, iron deficiency anemia  -transfuse p r n  for hemoglobin less than seven  -appreciate Hematology input, further management as per Hematology  History of myeloproliferative disorder, polycythemiavera  -Aranesp as per Hematology  CKD BMD, hyperparathyroidism, currently on p o  Calcitriol  Metabolic acidosis in the setting of advanced CKD, continue sodium bicarb supplement  History of nonfunctional bladder, bilateral hydronephrosis, further management as per Urology, status post nephrostomy tube    Discussed above plan in detail with primary team     SUBJECTIVE:  Patient seen and examined at bedside   No chest pain, shortness of breath, nausea, vomiting, abdominal pain    OBJECTIVE:  Current Weight: Weight - Scale: 83 9 kg (185 lb)  Vitals:    10/04/21 1250   BP: 132/76   Pulse: 74   Resp: 14   Temp: 98 1 °F (36 7 °C)   SpO2: 100%       Intake/Output Summary (Last 24 hours) at 10/4/2021 1349  Last data filed at 10/4/2021 1250  Gross per 24 hour   Intake 700 5 ml   Output 800 ml   Net -99 5 ml     Wt Readings from Last 3 Encounters:   09/30/21 83 9 kg (185 lb)   09/22/21 84 7 kg (186 lb 12 8 oz)   09/15/21 82 1 kg (181 lb)     Temp Readings from Last 3 Encounters:   10/04/21 98 1 °F (36 7 °C) (Oral)   09/29/21 (!) 97 °F (36 1 °C) (Temporal)   09/22/21 98 °F (36 7 °C) (Tympanic)     BP Readings from Last 3 Encounters:   10/04/21 132/76   09/29/21 128/68   09/22/21 142/80     Pulse Readings from Last 3 Encounters:   10/04/21 74   09/29/21 64   09/22/21 64        Physical Examination:  General:  Lying in bed, no acute distress   Eyes:  Mild conjunctival pallor present  ENT:  External examination of ears and nose unremarkable  Neck:  No obvious lymphadenopathy appreciated  Respiratory:  Bilateral air entry present  CVS:  S1, S2 present  GI: , nondistended  CNS:  Active alert oriented x3  Skin:  No new rash in legs  Musculoskeletal:  No obvious new gross deformity noted    Medications:    Current Facility-Administered Medications:     acetaminophen (TYLENOL) tablet 650 mg, 650 mg, Oral, Q6H PRN, Neyda Alves MD, 650 mg at 10/02/21 0547    atorvastatin (LIPITOR) tablet 40 mg, 40 mg, Oral, HS, Neyda Alves MD, 40 mg at 10/03/21 2115    calcitriol (ROCALTROL) capsule 0 25 mcg, 0 25 mcg, Oral, Daily, Neyda Alves MD, 0 25 mcg at 10/04/21 0818    citalopram (CeleXA) tablet 20 mg, 20 mg, Oral, Daily, Neyda Alves MD, 20 mg at 10/04/21 0818    heparin (porcine) subcutaneous injection 5,000 Units, 5,000 Units, Subcutaneous, Q8H [facility], Robbie Duong MD, 5,000 Units at 10/04/21 0517    levothyroxine tablet 75 mcg, 75 mcg, Oral, Early Morning, Mary Mchugh MD, 75 mcg at 10/04/21 0517    melatonin tablet 3 mg, 3 mg, Oral, HS, Mary Mchugh MD, 3 mg at 10/03/21 2115    metoprolol tartrate (LOPRESSOR) tablet 25 mg, 25 mg, Oral, BID, Mary Mchugh MD, 25 mg at 10/04/21 0818    oxyCODONE (ROXICODONE) IR tablet 2 5 mg, 2 5 mg, Oral, Q4H PRN, Mary Mchugh MD, 2 5 mg at 10/01/21 0908    oxyCODONE (ROXICODONE) IR tablet 5 mg, 5 mg, Oral, Q4H PRN, Mary Mchugh MD, 5 mg at 09/30/21 2337    sodium bicarbonate tablet 650 mg, 650 mg, Oral, BID after meals, Meghan Yoon MD, 650 mg at 10/04/21 0818    Laboratory Results:  Results from last 7 days   Lab Units 10/04/21  0525 10/03/21  1504 10/03/21  0008 10/02/21  0646 10/01/21  1307 09/29/21  1233   WBC Thousand/uL 5 38 6 17  --  4 56 6 12 4 87   HEMOGLOBIN g/dL 7 0* 7 3* 7 0* 6 7* 7 0* 7 6*   HEMATOCRIT % 22 6* 23 5* 22 3* 21 9* 23 6* 24 7*   PLATELETS Thousands/uL 179 203  --  200 256 266   SODIUM mmol/L 140 140  --  139 138  --    POTASSIUM mmol/L 4 2 4 3  --  3 9 4 2  --    CHLORIDE mmol/L 112* 110*  --  113* 112*  --    CO2 mmol/L 22 25  --  18* 21  --    BUN mg/dL 36* 32*  --  31* 28*  --    CREATININE mg/dL 3 23* 3 10*  --  3 26* 3 11*  --    CALCIUM mg/dL 8 5 8 2*  --  8 1* 8 3  --    MAGNESIUM mg/dL  --   --   --  1 9  --   --    PHOSPHORUS mg/dL  --   --   --  3 4  --   --        XR chest portable ICU   Final Result by Pietro Atkinson MD (10/04 0254)      Left jugular catheter with a transverse orientation in the left brachiocephalic vein with no pneumothorax  Mild pulmonary venous congestion  Workstation performed: [id number]         IR nephrostomy tube check/change/reposition/reinsertion/upsize    (Results Pending)       Portions of the record may have been created with voice recognition software  Occasional wrong word or "sound a like" substitutions may have occurred due to the inherent limitations of voice recognition software   Read the chart carefully and recognize, using context, where substitutions have occurred

## 2021-10-04 NOTE — CASE MANAGEMENT
Pt is not a <30 day readmission or current bundle  CM met with patient bedside to introduce self/CM role and begin discharge planning  Pt lives with her son who she reports has"high functioning autism" in a 2 story house that has 6 MARLON  Pt independent with ADLs PTA  Ambulating independently with RW and cane  Pt's family or neighbor provided transportation to medical appts prior to admission  Pt has history of VNA with [facility] and [address] (pt currently on service with [address]), pt has hx of STR at [facility], no hx of inpatient MH treatment or D/A treatment  Pharmacy: [facility] on [address]  PCP: Memo Neumann  AD/LW/POA: None    Pt states preferred d/c plan is to go home and resume VNA services  Pt's neighbor to transport home when stable  Referral placed on [facility] back to [facility]  CM to follow for d/c planning  CM reviewed d/c planning process including the following: identifying help at home, patient preference for d/c planning needs, Discharge Lounge, [facility] Meds to Bed program, availability of treatment team to discuss questions or concerns patient and/or family may have regarding understanding medications and recognizing signs and symptoms once discharged  CM also encouraged patient to follow up with all recommended appointments after discharge  Patient advised of importance for patient and family to participate in managing patients medical well being

## 2021-10-04 NOTE — PLAN OF CARE
Problem: PHYSICAL THERAPY ADULT  Goal: Performs mobility at highest level of function for planned discharge setting  See evaluation for individualized goals  Description: Treatment/Interventions: Functional transfer training, LE strengthening/ROM, Elevations, Therapeutic exercise, Endurance training, Patient/family training, Equipment eval/education, Bed mobility, Gait training, Spoke to nursing, OT  Equipment Recommended: Romeo Ferguson (pt reports having at home)       See flowsheet documentation for full assessment, interventions and recommendations  Note: Prognosis: Good  Problem List: Decreased strength, Decreased endurance, Impaired balance, Decreased mobility, Decreased coordination  Assessment: Pt is a 77 y o  female admitted to [facility] on 9/30/2021 with a primary dx of stage 5 chronic kidney dz not on chronic dialysis ([address] )  She underwent superficialization and transposition of R brachiobasilic fistula on 7/81/0743  Other active problems include poor venous access, iron deficiency anemia due to chronic blood loss, polycythemia vera ([address] ), obstructive uropathy, chronic saddle pulmonary embolism ([address] )  Pt has the following comorbidities which affect their treatment:  has a past medical history of Anxiety, Bowel obstruction ([address] ), Chronic kidney disease, Chronic kidney disease (CKD), stage IV (severe) ([address] ), Chronic thrombosis of subclavian vein ([address] ), Circulation problem, Compression fracture of cervical spine ([address] ), COVID-19 (07/2021), Hernia of abdominal cavity, History of kidney problems, History of transfusion, Hydronephrosis, Hypertension, IBS (irritable bowel syndrome), Incontinence, Lung mass, Polycythemia vera ([address] ), Pulmonary embolism ([address] ) (2014), Shingles, and Urinary tract infection  as well as personal factors including living alone with 6 MARLON and [employer] to 2nd floor bed/bath   Pt has a high complexity clinical presentation due to Ongoing medical management for primary dx, Increased reliance on more restrictive AD compared to baseline, Decreased activity tolerance compared to baseline, Fall risk, Increased assistance needed from caregiver at current time, Trending lab values, Continuous pulse oximetry monitoring  , and PMH  PT was consulted to evaluate pt's functional mobility and discharge needs  Upon evaluation, patient required S for bed mobility, Min Ax1 progressing to S for STS transfers, CGA for ambulation  Pt fatigued after ambulating 75', requiring seated rest break  Pt declined stair trial 2* fatigue  Pt's functional impairments include: decreased strength, balance, endurance, and activity tolerance  At conclusion of eval, pt remained seated in chair with phone, call bell, and all other personal needs within reach  The patient's AM-PAC Basic Mobility Inpatient Short Form Raw Score is 20, Standardized Score is 43 99  A standardized score greater than 42 9 suggests the patient may benefit from discharge to home  Please also refer to the recommendation of the Physical Therapist for safe discharge planning  Pt would benefit from skilled PT to address their functional mobility deficits, and safely assess stair negotiation to promote a safe return to home environment  D/C recommendations are home with HHPT pending stair trial and further progress  Barriers to Discharge: Inaccessible home environment, Decreased caregiver support        PT Discharge Recommendation: Home with home health rehabilitation     PT - OK to Discharge: No (pending stair trial and further progress )    See flowsheet documentation for full assessment

## 2021-10-04 NOTE — PROGRESS NOTES
Pastoral Care Progress Note    10/4/2021  Patient: Domingo Guthrie :   Admission Date & Time: 2021 1104  MRN: [id number] CSN: [id number]           10/04/21 1500   Clinical Encounter Type   Visited With Patient   Routine Visit Introduction  (Kervin Wolf)    [address] of Sick-Anointing Patient declined anointing   Sacrament Other Other (Comment)  ([address] and Karen)

## 2021-10-04 NOTE — PHYSICAL THERAPY NOTE
PHYSICAL THERAPY EVALUATION          Patient Name: Mauricio NICHOLAS Date: 10/4/2021     10/04/21 0849   PT Last Visit   PT Visit Date 10/04/21   Note Type   Note type Evaluation   Pain Assessment   Pain Assessment Tool Pain Assessment not indicated - pt denies pain   Home Living   Type of [address] Two level;Stairs to enter with rails  (6STE, FFSU bed/bath )   Bathroom Shower/Tub Walk-in shower   Bathroom Toilet Standard   Bathroom Equipment Grab bars in shower; Shower chair   Home Equipment Walker;Cane   Additional Comments pt reports using RW at recent SNF stay, however did not utilize once home    Prior Function   Level of [place] Independent with ADLs and functional mobility   Lives With [address]   IADLs Independent   Falls in the last 6 months 0   Comments pt reports her son who she was residing with recently passed away  She is currently residing at home and has a neighbor who can assist as needed    Restrictions/Precautions   Weight Bearing Precautions Per Order No   Other Precautions Fall Risk  (ostomy bag, anemia, CVC )   General   Additional Pertinent History pt anemic, received blood transfusion over weekend   Pt receiving HHPT PTA   Family/Caregiver Present No   Cognition   Overall Cognitive Status WFL   Arousal/Participation Cooperative   Orientation Level Oriented X4   Memory Decreased short term memory   Following Commands Follows all commands and directions without difficulty   Comments pt is pleasant to work with    RLE Assessment   RLE Assessment X   Strength RLE   RLE Overall Strength 4-/5   LLE Assessment   LLE Assessment X   Strength LLE   LLE Overall Strength 4-/5   Bed Mobility   Supine to Sit 5  Supervision   Additional items Increased time required;HOB elevated   Sit to Supine Unable to assess   Additional Comments pt left seated in chair upon conclusion of PT eval    Transfers   Sit to Stand 4  Minimal assistance   Additional items Assist x 1; Increased time required;Verbal cues  (MinAx1 from bed, S from armed chair )   Stand to Sit 4  Minimal assistance   Additional items Assist x 1; Increased time required;Verbal cues   Stand pivot 4  Minimal assistance   Additional items Assist x 1; Increased time required;Verbal cues   Additional Comments c RW; VC for hand placement to ensure safety    Ambulation/Elevation   Gait pattern Improper Weight shift;Decreased foot clearance; Foward flexed; Short stride; Excessively slow   Gait Assistance 4  Minimal assist  (CGA)   Additional items Assist x 1;Verbal cues   Assistive Device Rolling walker   Distance 75'x2  (seated rest break, chair follow )   Stair Management Assistance Not tested  (pt declined stair trial 2* fatigue )   Balance   Static Sitting Fair +   Dynamic Sitting Fair   Static Standing Fair -   Dynamic Standing Poor +   Ambulatory Poor +   Endurance Deficit   Endurance Deficit Yes   Endurance Deficit Description fatigue    Activity Tolerance   Activity Tolerance Patient limited by fatigue   Medical Staff Made Aware co-eval performed with Ela DOUGLAS 2* medical complexity    Nurse Made Aware pt cleared to see and mobilize per nursing    Assessment   Prognosis Good   Problem List Decreased strength;Decreased endurance; Impaired balance;Decreased mobility; Decreased coordination   Assessment Pt is a 77 y o  female admitted to [facility] on 9/30/2021 with a primary dx of stage 5 chronic kidney dz not on chronic dialysis ([facility] [address] )  She underwent superficialization and transposition of R brachiobasilic fistula on 7/22/8245  Other active problems include poor venous access, iron deficiency anemia due to chronic blood loss, polycythemia vera ([facility] [address] ), obstructive uropathy, chronic saddle pulmonary embolism ([facility] [address] )     Pt has the following comorbidities which affect their treatment:  has a past medical history of Anxiety, Bowel obstruction ([facility] [address] ), Chronic kidney disease, Chronic kidney disease (CKD), stage IV (severe) ([facility] [address] ), Chronic thrombosis of subclavian vein ([facility] [address] ), Circulation problem, Compression fracture of cervical spine ([facility] [address] ), COVID-19 (07/2021), Hernia of abdominal cavity, History of kidney problems, History of transfusion, Hydronephrosis, Hypertension, IBS (irritable bowel syndrome), Incontinence, Lung mass, Polycythemia vera ([facility] [address] ), Pulmonary embolism ([facility] [address] ) (2014), Shingles, and Urinary tract infection  as well as personal factors including living alone with 6 MARLON and FFSU to 2nd floor bed/bath  Pt has a high complexity clinical presentation due to Ongoing medical management for primary dx, Increased reliance on more restrictive AD compared to baseline, Decreased activity tolerance compared to baseline, Fall risk, Increased assistance needed from caregiver at current time, Trending lab values, Continuous pulse oximetry monitoring  , and PMH  PT was consulted to evaluate pt's functional mobility and discharge needs  Upon evaluation, patient required S for bed mobility, Min Ax1 progressing to S for STS transfers, CGA for ambulation  Pt fatigued after ambulating 75', requiring seated rest break  Pt declined stair trial 2* fatigue  Pt's functional impairments include: decreased strength, balance, endurance, and activity tolerance  At conclusion of eval, pt remained seated in chair with phone, call bell, and all other personal needs within reach  The patient's AM-PAC Basic Mobility Inpatient Short Form Raw Score is 20, Standardized Score is 43 99  A standardized score greater than 42 9 suggests the patient may benefit from discharge to home  Please also refer to the recommendation of the Physical Therapist for safe discharge planning  Pt would benefit from skilled PT to address their functional mobility deficits, and safely assess stair negotiation to promote a safe return to home environment   D/C recommendations are home with HHPT pending stair trial and further progress   Barriers to Discharge Inaccessible home environment;Decreased caregiver support   Goals   Patient Goals to go home   STG Expiration Date 10/18/21   Short Term Goal #1 In 14 days, pt will: 1) perform bed mobility I to promote safe functional independence  2) perform transfers I to promote safe return to home alone  3) ambulate 300' with mod I and least restrictive device to promote safe return to home and community  4) negotiate 13 stairs with mod I to promote safe return and access to home environment  5) improve balance grades by 1/2 to promote safety with functional mobility  6) improve strength grades by 1/2 to promote safety with functional mobility  PT Treatment Day 0   Plan   Treatment/Interventions Functional transfer training;LE strengthening/ROM; Elevations; Therapeutic exercise; Endurance training;Patient/family training;Equipment eval/education; Bed mobility;Gait training;Spoke to nursing;OT   PT Frequency Other (Comment)  (3-5x/wk)   Recommendation   PT Discharge Recommendation Home with home health rehabilitation   Equipment Recommended Crista Hinson  (pt reports having at home)   Juwan Ortega walker   PT - OK to Discharge No  (pending stair trial and further progress )   AM-PAC Basic Mobility Inpatient   Turning in Bed Without Bedrails 4   Lying on Back to Sitting on Edge of Flat Bed 4   Moving Bed to Chair 3   Standing Up From Chair 3   Walk in Room 3   Climb 3-5 Stairs 3   Basic Mobility Inpatient Raw Score 20   Basic Mobility Standardized Score 43 99   Modified Thien Scale   Modified Thien Scale 3   Ruel Hair, PT

## 2021-10-04 NOTE — PROGRESS NOTES
[facility]  Progress Note - Nadja Ram [birth year], 77 y o  female MRN: [id number]  Unit/Bed#: [facility] 322-01 Encounter: [id number]  Primary Care Provider: Abeba Singleton MD   Date and time admitted to hospital: 9/30/2021 11:04 AM    * Stage 5 chronic kidney disease not on chronic dialysis [facility])  Assessment & Plan  Lab Results   Component Value Date    EGFR 13 10/04/2021    EGFR 14 10/03/2021    EGFR 13 10/02/2021    CREATININE 3 23 (H) 10/04/2021    CREATININE 3 10 (H) 10/03/2021    CREATININE 3 26 (H) 10/02/2021     · Patient is a 76 yo F w/ progressing CKD, s/p creation R brachiobasilic fistula now s/p superficialization with transposition 9/30  · Initially admitted for OP elective surgery but found to have significant anemia  · Admitted under Vascular surgery   · SLIM consulted for medical management and transfer care over under Huy Bernal  · Nephrology consult appreciated, does not need urgent dialysis for now  Poor venous access  Assessment & Plan  Patient has poor venous access, multiple attempts were made for obtaining IV access  Unfortunately patient is not candidate for PICC line due to advanced CKD  Central line placed yesterday due to no access and needing transfusion    Iron deficiency anemia due to chronic blood loss  Assessment & Plan  So far patient received 2 units PRBC  Hold jakafi  Iron panel shows elevated stored iron  Hemoglobin remains at 7  Patient had central line placed yesterday due to poor venous access and contraindication to picc  Will transfuse 1 unit PRBC today    Repeat H&H, if remains stable, may be discharged tomorrow with home VNA    Polycythemia vera ([address] )  Assessment & Plan  Significant anemia noted secondary to chronic blood loss from ostomy bag  Hold JAKAFI  Heme Onc consult appreciated, okay to hold Walter Vinny on discharge    Obstructive uropathy  Assessment & Plan  · Chronic bilateral hydronephrosis with stent placement  · underwent conversion to retrograde nephroureterostomy tube  · Tea colored urine in ostomy bag since left PCN placement 21  · IR consulted for left pcn removal, now scheduled for 10/5  · In 10/2020 Hydronephrosis and hematuria 2021  S/P Nephrostomy tube placement 21  · Patient for tube check today by IR    Chronic saddle pulmonary embolism (HCC)  Assessment & Plan  On Eliquis 2 5 mg BID at home, currently on hold by vascular surgery  Resume on discharge    VTE Pharmacologic Prophylaxis:   Pharmacologic: Heparin  Mechanical VTE Prophylaxis in Place: Yes    Patient Centered Rounds: I have performed bedside rounds with nursing staff today  Discussions with Specialists or Other Care Team Provider:  IR    Education and Discussions with Family / Patient:  Discussed with patient  Updated son Diana Arellano       Current Length of Stay: 3 day(s)    Current Patient Status: Inpatient   Certification Statement: The patient will continue to require additional inpatient hospital stay due to Need 1 unit of PRBC today, need IR guided tube check    Discharge Plan:  Likely 24 hours with home VNA    Code Status: Level 1 - Full Code      Subjective:   Patient is sitting up on the bedside chair  Denies any concern or complaint  Denies any dyspnea  Patient is receiving PRBC, no reaction noted  Objective:     Vitals:   Temp (24hrs), Av 5 °F (36 9 °C), Min:97 9 °F (36 6 °C), Max:99 2 °F (37 3 °C)    Temp:  [97 9 °F (36 6 °C)-99 2 °F (37 3 °C)] 97 9 °F (36 6 °C)  HR:  [68-82] 74  Resp:  [12-16] 12  BP: (122-149)/(57-87) 149/60  SpO2:  [94 %-100 %] 100 %  Body mass index is 36 13 kg/m²  Input and Output Summary (last 24 hours): Intake/Output Summary (Last 24 hours) at 10/4/2021 1203  Last data filed at 10/4/2021 1115  Gross per 24 hour   Intake 368 ml   Output 450 ml   Net -82 ml       Physical Exam:     Physical Exam  Constitutional:       Appearance: She is well-developed  She is obese     HENT: Head: Normocephalic and atraumatic  Pulmonary:      Effort: Pulmonary effort is normal  No respiratory distress  Breath sounds: Normal breath sounds  Musculoskeletal:      Cervical back: Normal range of motion  Skin:     General: Skin is warm and dry  Findings: Bruising present  Additional Data:     Labs:    Results from last 7 days   Lab Units 10/04/21  0525 09/29/21  1233   WBC Thousand/uL 5 38 4 87   HEMOGLOBIN g/dL 7 0* 7 6*   HEMATOCRIT % 22 6* 24 7*   PLATELETS Thousands/uL 179 266   NEUTROS PCT %  --  71   LYMPHS PCT %  --  14   MONOS PCT %  --  11   EOS PCT %  --  2     Results from last 7 days   Lab Units 10/04/21  0525   SODIUM mmol/L 140   POTASSIUM mmol/L 4 2   CHLORIDE mmol/L 112*   CO2 mmol/L 22   BUN mg/dL 36*   CREATININE mg/dL 3 23*   ANION GAP mmol/L 6   CALCIUM mg/dL 8 5   GLUCOSE RANDOM mg/dL 88     Results from last 7 days   Lab Units 09/30/21  1206   INR  1 12                          Last 24 Hours Medication List:   Current Facility-Administered Medications   Medication Dose Route Frequency Provider Last Rate    acetaminophen  650 mg Oral Q6H PRN Neyda Alves MD      atorvastatin  40 mg Oral HS Neyda Alves MD      calcitriol  0 25 mcg Oral Daily Neyda Alves MD      citalopram  20 mg Oral Daily Neyda Alves MD      heparin (porcine)  5,000 Units Subcutaneous [facility] Robbie Duong MD      levothyroxine  75 mcg Oral Early Morning Neyda Alves MD      melatonin  3 mg Oral HS Neyda Alves MD      metoprolol tartrate  25 mg Oral BID Neyda Alves MD      oxyCODONE  2 5 mg Oral Q4H PRN Neyda Alves MD      oxyCODONE  5 mg Oral Q4H PRN Neyda Alves MD      sodium bicarbonate  650 mg Oral BID after meals Evy Garber MD          Today, Patient Was Seen By: Gemma Piper MD    ** Please Note: Dictation voice to text software may have been used in the creation of this document   **

## 2021-10-05 VITALS
HEART RATE: 80 BPM | BODY MASS INDEX: 36.32 KG/M2 | SYSTOLIC BLOOD PRESSURE: 131 MMHG | OXYGEN SATURATION: 100 % | HEIGHT: 60 IN | WEIGHT: 185 LBS | RESPIRATION RATE: 18 BRPM | TEMPERATURE: 98.4 F | DIASTOLIC BLOOD PRESSURE: 66 MMHG

## 2021-10-05 LAB
ABO GROUP BLD BPU: NORMAL
BPU ID: NORMAL
CROSSMATCH: NORMAL
HCT VFR BLD AUTO: 26.2 % (ref 34.8–46.1)
HCT VFR BLD AUTO: 26.3 % (ref 34.8–46.1)
HGB BLD-MCNC: 8.2 G/DL (ref 11.5–15.4)
HGB BLD-MCNC: 8.3 G/DL (ref 11.5–15.4)
UNIT DISPENSE STATUS: NORMAL
UNIT PRODUCT CODE: NORMAL
UNIT PRODUCT VOLUME: 350 ML
UNIT RH: NORMAL

## 2021-10-05 PROCEDURE — 85014 HEMATOCRIT: CPT | Performed by: FAMILY MEDICINE

## 2021-10-05 PROCEDURE — 99024 POSTOP FOLLOW-UP VISIT: CPT | Performed by: PHYSICIAN ASSISTANT

## 2021-10-05 PROCEDURE — 85018 HEMOGLOBIN: CPT | Performed by: FAMILY MEDICINE

## 2021-10-05 PROCEDURE — 99239 HOSP IP/OBS DSCHRG MGMT >30: CPT | Performed by: PHYSICIAN ASSISTANT

## 2021-10-05 PROCEDURE — 99232 SBSQ HOSP IP/OBS MODERATE 35: CPT | Performed by: INTERNAL MEDICINE

## 2021-10-05 RX ADMIN — HEPARIN SODIUM 5000 UNITS: 5000 INJECTION INTRAVENOUS; SUBCUTANEOUS at 06:12

## 2021-10-05 RX ADMIN — SODIUM BICARBONATE 650 MG TABLET 650 MG: at 08:26

## 2021-10-05 RX ADMIN — LEVOTHYROXINE SODIUM 75 MCG: 75 TABLET ORAL at 06:12

## 2021-10-05 RX ADMIN — CALCITRIOL CAPSULES 0.25 MCG 0.25 MCG: 0.25 CAPSULE ORAL at 08:26

## 2021-10-05 RX ADMIN — CITALOPRAM HYDROBROMIDE 20 MG: 20 TABLET ORAL at 08:26

## 2021-10-05 RX ADMIN — DOCUSATE SODIUM 100 MG: 100 CAPSULE ORAL at 08:26

## 2021-10-05 RX ADMIN — METOPROLOL TARTRATE 25 MG: 25 TABLET, FILM COATED ORAL at 08:26

## 2021-10-05 NOTE — PROGRESS NOTES
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 in October 2016, status post nephrostomy tube placement 05/11/2021 and status postleft percutaneous/retrograde percutaneous nephrostomy placement on 09/20/2021, obesity and CKD stage 5 who was admitted for elective right brachial basilic fistula superficialization with transposition on 09/30/2021  Nephrology has been consulted for evaluation and management of CKD stage 5      CKD stage 5, baseline creatinine 3 to 3 5, follows with Dr Porfirio Zhang  -creatinine overall stable at baseline 3 2 yesterday   No BMP available today  -CKD suspect secondary to obstructive nephropathy, prior episodes of LUANN  -no emergent indication for dialysis  -close outpatient Nephrology follow-up recommended     Access, currently has right upper extremity AV fistula initially created in January 2021, status post fistulogram with angioplasty earlier this year  Status post transposition on 9/30/21  Vascular surgery follow-up      CKD anemia, iron deficiency anemia  -transfuse p r n  for hemoglobin less than seven  -appreciate Hematology input, further management as per Hematology  History of myeloproliferative disorder, polycythemiavera  -Aranesp as per Hematology      CKD BMD, hyperparathyroidism, currently on p o  Calcitriol      Metabolic acidosis in the setting of advanced CKD, continue sodium bicarb supplement    Serum bicarb overall stable 22 yesterday      History of nonfunctional bladder, bilateral hydronephrosis, further management as per Urology, status post nephrostomy tube     Discussed above plan in detail with primary team   She is likely being discharged today  Would recommend repeat BMP in one week and outpatient close Nephrology follow-up  SUBJECTIVE:  Patient seen and examined at bedside   No chest pain, shortness of breath, nausea, vomiting, abdominal pain    OBJECTIVE:  Current Weight: Weight - Scale: 83 9 kg (185 lb)  Vitals:    10/05/21 0700   BP: 131/66   Pulse: 80   Resp: 18   Temp: 98 4 °F (36 9 °C)   SpO2: 100%       Intake/Output Summary (Last 24 hours) at 10/5/2021 1307  Last data filed at 10/5/2021 0825  Gross per 24 hour   Intake 240 ml   Output 450 ml   Net -210 ml     Wt Readings from Last 3 Encounters:   09/30/21 83 9 kg (185 lb)   09/22/21 84 7 kg (186 lb 12 8 oz)   09/15/21 82 1 kg (181 lb)     Temp Readings from Last 3 Encounters:   10/05/21 98 4 °F (36 9 °C) (Oral)   09/29/21 (!) 97 °F (36 1 °C) (Temporal)   09/22/21 98 °F (36 7 °C) (Tympanic)     BP Readings from Last 3 Encounters:   10/05/21 131/66   09/29/21 128/68   09/22/21 142/80     Pulse Readings from Last 3 Encounters:   10/05/21 80   09/29/21 64   09/22/21 64        Physical Examination:  General:  Sitting in bed, no acute distress   Eyes:  Mild conjunctival pallor present  ENT:  External examination of ears and nose unremarkable  Neck:  No obvious lymphadenopathy appreciated  Respiratory:  Bilateral air entry present  CVS:  S1, S2 present  GI:  Soft, nondistended  CNS:  Active alert oriented x3  Skin:  No new rash in legs  Musculoskeletal:  No obvious new gross deformity noted    Medications:    Current Facility-Administered Medications:     acetaminophen (TYLENOL) tablet 650 mg, 650 mg, Oral, Q6H PRN, Mary Mchugh MD, 650 mg at 10/04/21 1748    atorvastatin (LIPITOR) tablet 40 mg, 40 mg, Oral, HS, Mary Mchugh MD, 40 mg at 10/04/21 2232    calcitriol (ROCALTROL) capsule 0 25 mcg, 0 25 mcg, Oral, Daily, Mary Mchugh MD, 0 25 mcg at 10/05/21 0826    citalopram (CeleXA) tablet 20 mg, 20 mg, Oral, Daily, Mary Mchugh MD, 20 mg at 10/05/21 0826    docusate sodium (COLACE) capsule 100 mg, 100 mg, Oral, BID, Humberto Duong MD, 100 mg at 10/05/21 0826    heparin (porcine) subcutaneous injection 5,000 Units, 5,000 Units, Subcutaneous, Q8H [facility], Hayley Oseguera MD, 5,000 Units at 10/05/21 0612    levothyroxine tablet 75 mcg, 75 mcg, Oral, Early Morning, Kim Esteban MD, 75 mcg at 10/05/21 0612    melatonin tablet 3 mg, 3 mg, Oral, HS, Kim Esteban MD, 3 mg at 10/04/21 2232    metoprolol tartrate (LOPRESSOR) tablet 25 mg, 25 mg, Oral, BID, Kim sEteban MD, 25 mg at 10/05/21 0826    oxyCODONE (ROXICODONE) IR tablet 2 5 mg, 2 5 mg, Oral, Q4H PRN, Kim Esteban MD, 2 5 mg at 10/01/21 0908    oxyCODONE (ROXICODONE) IR tablet 5 mg, 5 mg, Oral, Q4H PRN, Kim Esteban MD, 5 mg at 09/30/21 2337    polyethylene glycol (MIRALAX) packet 17 g, 17 g, Oral, Daily PRN, Humberto Duong MD    sodium bicarbonate tablet 650 mg, 650 mg, Oral, BID after meals, Zoë Monroy MD, 650 mg at 10/05/21 0826    Laboratory Results:  Results from last 7 days   Lab Units 10/05/21  0652 10/05/21  0005 10/04/21  1753 10/04/21  0525 10/03/21  1504 10/03/21  0008 10/02/21  0646 10/01/21  1307 09/29/21  1233   WBC Thousand/uL  --   --   --  5 38 6 17  --  4 56 6 12 4 87   HEMOGLOBIN g/dL 8 2* 8 3* 8 7* 7 0* 7 3* 7 0* 6 7* 7 0* 7 6*   HEMATOCRIT % 26 2* 26 3* 27 8* 22 6* 23 5* 22 3* 21 9* 23 6* 24 7*   PLATELETS Thousands/uL  --   --   --  179 203  --  200 256 266   SODIUM mmol/L  --   --   --  140 140  --  139 138  --    POTASSIUM mmol/L  --   --   --  4 2 4 3  --  3 9 4 2  --    CHLORIDE mmol/L  --   --   --  112* 110*  --  113* 112*  --    CO2 mmol/L  --   --   --  22 25  --  18* 21  --    BUN mg/dL  --   --   --  36* 32*  --  31* 28*  --    CREATININE mg/dL  --   --   --  3 23* 3 10*  --  3 26* 3 11*  --    CALCIUM mg/dL  --   --   --  8 5 8 2*  --  8 1* 8 3  --    MAGNESIUM mg/dL  --   --   --   --   --   --  1 9  --   --    PHOSPHORUS mg/dL  --   --   --   -- --   --  3 4  --   --        XR chest portable ICU   Final Result by Tanisha Tran MD (10/04 6262)      Left jugular catheter with a transverse orientation in the left brachiocephalic vein with no pneumothorax  Mild pulmonary venous congestion  Workstation performed: [id number]         IR nephrostomy tube check/change/reposition/reinsertion/upsize    (Results Pending)       Portions of the record may have been created with voice recognition software  Occasional wrong word or "sound a like" substitutions may have occurred due to the inherent limitations of voice recognition software  Read the chart carefully and recognize, using context, where substitutions have occurred

## 2021-10-05 NOTE — DISCHARGE SUMMARY
[facility]  Discharge- Pam Guzman [birth year], 77 y o  female MRN: [id number]  Unit/Bed#: [facility] 322-01 Encounter: [id number]  Primary Care Provider: Ronak Tyson MD   Date and time admitted to hospital: 9/30/2021 11:04 AM    * Stage 5 chronic kidney disease not on chronic dialysis [facility])  Assessment & Plan  Lab Results   Component Value Date    EGFR 13 10/04/2021    EGFR 14 10/03/2021    EGFR 13 10/02/2021    CREATININE 3 23 (H) 10/04/2021    CREATININE 3 10 (H) 10/03/2021    CREATININE 3 26 (H) 10/02/2021     · Patient is a 78 yo F w/ progressing CKD, s/p creation R brachiobasilic fistula now s/p superficialization with transposition 9/30  · Initially admitted for OP elective surgery but found to have significant anemia  · Admitted under Vascular surgery   · SLIM consulted for medical management and transfer care over under Arlette Caro  · Nephrology consult appreciated, does not need urgent dialysis for now  Obstructive uropathy  Assessment & Plan  · Chronic bilateral hydronephrosis with stent placement  · underwent conversion to retrograde nephroureterostomy tube  · Tea colored urine in ostomy bag since left PCN placement 9/20/21  · IR consulted for left pcn removal, now scheduled for 10/5  · In 10/2020 Hydronephrosis and hematuria 5/2021  S/P Nephrostomy tube placement 5/11/21  · tube rechecked by IR yesterday with removal       Polycythemia vera ([address] )  Assessment & Plan  Significant anemia noted secondary to chronic blood loss from ostomy bag  Hold JAKAFI  Heme Onc consult appreciated, okay to hold Walter Vinny on discharge    Chronic saddle pulmonary embolism (HCC)  Assessment & Plan  On Eliquis 2 5 mg BID at home, currently on hold by vascular surgery    Resume on discharge    Iron deficiency anemia due to chronic blood loss  Assessment & Plan  So far patient received 2 units PRBC  Hold jakafi  Iron panel shows elevated stored iron  Hemoglobin remains at 7  Patient had central line placed yesterday due to poor venous access and contraindication to picc  Will transfuse 1 unit PRBC today    Repeat H&H, if remains stable, may be discharged tomorrow with home VNA    Poor venous access  Assessment & Plan  Patient has poor venous access, multiple attempts were made for obtaining IV access  Unfortunately patient is not candidate for PICC line due to advanced CKD  No further bleeding today   Removal of central line on discharge       Medical Problems     Resolved Problems  Date Reviewed: 10/4/2021    None              Discharging Physician / Practitioner: El Sood PA-C  PCP: Luisa Tanner MD  Admission Date:   Admission Orders (From admission, onward)     Ordered        10/01/21 1605  Inpatient Admission  Once                   Discharge Date: 10/05/21    Consultations During Hospital Stay:  · Vascular surgery  · Interventional radiology  · Nephrology  · Oncology  · Critical care  · Respiratory therapy    Procedures Performed:   · Central line placement  · superficialization and transposition of right brachiobasicli fistula   · IR nephrostomy tube check/change/reposition  · Chest x-ray:  Left jugular catheter with transverse orientation left brachiocephalic vein with no pneumothorax  Mild pulmonary venous congestion  Significant Findings / Test Results:   · Anemia     Incidental Findings:   · none      Test Results Pending at Discharge (will require follow up): · None      Outpatient Tests Requested:  · None     Complications:  Anemia secondary to iron deficiency  Patient required PRBC transfusions while here     Reason for Admission:  Anemia    Hospital Course:   Justino Tenorio is a 77 y o  female patient who originally presented to the hospital on 9/30/2021 due to elective procedure by vascular surgery, however she was transition to internal medicine service given her anemia      Patient had superficialization and transposition of right brachial basilic fistula done by vascular surgery  Emir was consulted for stage 5 chronic kidney disease, and Nephrology was this consult  She was noted to have poor venous access and was not deemed candidate for PICC line due to advanced CKD  Patient was transfused 2 units of PRBCs for anemia  Her Tess Stacy was on hold, and she should continue to hold this on discharge  Oncology was consulted given patient's anemia, and known myeloproliferative disorder with positive polycythemia vera  They recommended patient follow up with outpatient oncologist, and continue to hold her Jakafi  Patient was noted to have problems with IV access, and was contraindicated from PICC line given her CKD  Thus decision was made to place central line for better IV access  Patient did have repeat blood work done showed hemoglobin 7 3 on 10/03  It was felt the patient did not need urgent dialysis during this visit per Nephrology  Status post central line insertion, patient was transfused with 1 unit of PRBCs  Her hemoglobin remained stable in the 8 after transfusion  She was able to be discharged home with VNA services  Patient was also seen by interventional radiology while here given chronic bilateral hydronephrosis with pop stent placement  Patient underwent conversion to retrograde nephroureterostomy tube  I was consulted for left PCN removal   She had no evidence of repeat blood hitting on exam today    Of note, patient was noted to have significantly swollen right upper extremity  She was evaluated by surgery who deemed that this is normal consequence of vascular access  She was stable for discharge at this time  Patient is to follow-up with vascular, and PCP within 2 weeks, this was discussed with her and she was in agreement  A more detailed hospital course, see notes attached to hospitalization  Please see above list of diagnoses and related plan for additional information       Condition at Discharge: good    Discharge Day Visit / Exam:   Subjective:  Concerned about going home today as she does live alone  No pain in RUE  Denies numbness, or tingling of the extremity  Vitals: Blood Pressure: 131/66 (10/05/21 0700)  Pulse: 80 (10/05/21 0700)  Temperature: 98 4 °F (36 9 °C) (10/05/21 0700)  Temp Source: Oral (10/05/21 0700)  Respirations: 18 (10/05/21 0700)  Height: 5' (152 4 cm) (09/30/21 2337)  Weight - Scale: 83 9 kg (185 lb) (09/30/21 2337)  SpO2: 100 % (10/05/21 0700)  Exam:   Physical Exam  Constitutional:       General: She is not in acute distress  Appearance: She is obese  She is ill-appearing (chronically ill  )  HENT:      Head: Normocephalic and atraumatic  Mouth/Throat:      Mouth: Mucous membranes are moist       Pharynx: Oropharynx is clear  No oropharyngeal exudate  Eyes:      General:         Right eye: No discharge  Left eye: No discharge  Conjunctiva/sclera: Conjunctivae normal       Pupils: Pupils are equal, round, and reactive to light  Cardiovascular:      Rate and Rhythm: Normal rate and regular rhythm  Pulses: Normal pulses  Heart sounds: Normal heart sounds  No murmur heard  Pulmonary:      Effort: Pulmonary effort is normal       Breath sounds: Normal breath sounds  Abdominal:      General: Abdomen is flat  Palpations: Abdomen is soft  Comments: Ostomy present  No obvious signs of infection  Musculoskeletal:         General: Swelling (RUE ) present  Cervical back: No muscular tenderness  Comments: Swelling noted of right upper extremity  Intact range of motion,  strength with +1 radial pulse  Skin:     General: Skin is warm and dry  Capillary Refill: Capillary refill takes less than 2 seconds  Coloration: Skin is not jaundiced  Findings: Bruising present  Neurological:      Mental Status: She is alert and oriented to person, place, and time     Psychiatric:         Mood and Affect: Mood normal  Discussion with Family: Updated  (friend) via phone  Discharge instructions/Information to patient and family:   See after visit summary for information provided to patient and family  Provisions for Follow-Up Care:  See after visit summary for information related to follow-up care and any pertinent home health orders  Disposition:   Home with VNA Services (Reminder: Complete face to face encounter)    Planned Readmission: none      Discharge Statement:  I spent 45 minutes discharging the patient  This time was spent on the day of discharge  I had direct contact with the patient on the day of discharge  Greater than 50% of the total time was spent examining patient, answering all patient questions, arranging and discussing plan of care with patient as well as directly providing post-discharge instructions  Additional time then spent on discharge activities  Discharge Medications:  See after visit summary for reconciled discharge medications provided to patient and/or family        **Please Note: This note may have been constructed using a voice recognition system**

## 2021-10-05 NOTE — PLAN OF CARE
Problem: Prexisting or High Potential for Compromised Skin Integrity  Goal: Skin integrity is maintained or improved  Description: INTERVENTIONS:  - Identify patients at risk for skin breakdown  - Assess and monitor skin integrity  - Assess and monitor nutrition and hydration status  - Monitor labs   - Assess for incontinence   - Turn and reposition patient  - Assist with mobility/ambulation  - Relieve pressure over bony prominences  - Avoid friction and shearing  - Provide appropriate hygiene as needed including keeping skin clean and dry  - Evaluate need for skin moisturizer/barrier cream  - Collaborate with interdisciplinary team   - Patient/family teaching  - Consider wound care consult   Outcome: Progressing     Problem: Potential for Falls  Goal: Patient will remain free of falls  Description: INTERVENTIONS:  - Educate patient/family on patient safety including physical limitations  - Instruct patient to call for assistance with activity   - Consult OT/PT to assist with strengthening/mobility   - Keep Call bell within reach  - Keep bed low and locked with side rails adjusted as appropriate  - Keep care items and personal belongings within reach  - Initiate and maintain comfort rounds  - Make Fall Risk Sign visible to staff  - Offer Toileting every 2 Hours, in advance of need  - Initiate/Maintain no   alarm  - Obtain necessary fall risk management equipment: none needed   - Apply yellow socks and bracelet for high fall risk patients  - Consider moving patient to room near nurses station  Outcome: Progressing     Problem: PAIN - ADULT  Goal: Verbalizes/displays adequate comfort level or baseline comfort level  Description: Interventions:  - Encourage patient to monitor pain and request assistance  - Assess pain using appropriate pain scale  - Administer analgesics based on type and severity of pain and evaluate response  - Implement non-pharmacological measures as appropriate and evaluate response  - Consider cultural and social influences on pain and pain management  - Notify physician/advanced practitioner if interventions unsuccessful or patient reports new pain  Outcome: Progressing     Problem: INFECTION - ADULT  Goal: Absence or prevention of progression during hospitalization  Description: INTERVENTIONS:  - Assess and monitor for signs and symptoms of infection  - Monitor lab/diagnostic results  - Monitor all insertion sites, i e  indwelling lines, tubes, and drains  - Monitor endotracheal if appropriate and nasal secretions for changes in amount and color  - [place] appropriate cooling/warming therapies per order  - Administer medications as ordered  - Instruct and encourage patient and family to use good hand hygiene technique  - Identify and instruct in appropriate isolation precautions for identified infection/condition  Outcome: Progressing     Problem: SAFETY ADULT  Goal: Patient will remain free of falls  Description: INTERVENTIONS:  - Educate patient/family on patient safety including physical limitations  - Instruct patient to call for assistance with activity   - Consult OT/PT to assist with strengthening/mobility   - Keep Call bell within reach  - Keep bed low and locked with side rails adjusted as appropriate  - Keep care items and personal belongings within reach  - Initiate and maintain comfort rounds  - Make Fall Risk Sign visible to staff  - Offer Toileting every 2 Hours, in advance of need  - Initiate/Maintain no alarm  - Obtain necessary fall risk management equipment: none needed  - Apply yellow socks and bracelet for high fall risk patients  - Consider moving patient to room near nurses station  Outcome: Progressing  Goal: Maintain or return to baseline ADL function  Description: INTERVENTIONS:  -  Assess patient's ability to carry out ADLs; assess patient's baseline for ADL function and identify physical deficits which impact ability to perform ADLs (bathing, care of mouth/teeth, toileting, grooming, dressing, etc )  - Assess/evaluate cause of self-care deficits   - Assess range of motion  - Assess patient's mobility; develop plan if impaired  - Assess patient's need for assistive devices and provide as appropriate  - Encourage maximum independence but intervene and supervise when necessary  - Involve family in performance of ADLs  - Assess for home care needs following discharge   - Consider OT consult to assist with ADL evaluation and planning for discharge  - Provide patient education as appropriate  Outcome: Progressing  Goal: Maintains/Returns to pre admission functional level  Description: INTERVENTIONS:  - Perform BMAT or MOVE assessment daily    - Set and communicate daily mobility goal to care team and patient/family/caregiver  - Collaborate with rehabilitation services on mobility goals if consulted  - Perform Range of Motion 3 times a day  - Reposition patient every 2 hours    - Dangle patient 3 times a day  - Stand patient 3 times a day  - Ambulate patient 3 times a day  - Out of bed to chair 3 times a day   - Out of bed for meals 3 times a day  - Out of bed for toileting  - Record patient progress and toleration of activity level   Outcome: Progressing     Problem: DISCHARGE PLANNING  Goal: Discharge to home or other facility with appropriate resources  Description: INTERVENTIONS:  - Identify barriers to discharge w/patient and caregiver  - Arrange for needed discharge resources and transportation as appropriate  - Identify discharge learning needs (meds, wound care, etc )  - Arrange for interpretive services to assist at discharge as needed  - Refer to Case Management Department for coordinating discharge planning if the patient needs post-hospital services based on physician/advanced practitioner order or complex needs related to functional status, cognitive ability, or social support system  Outcome: Progressing     Problem: Knowledge Deficit  Goal: Patient/family/caregiver demonstrates understanding of disease process, treatment plan, medications, and discharge instructions  Description: Complete learning assessment and assess knowledge base  Interventions:  - Provide teaching at level of understanding  - Provide teaching via preferred learning methods  Outcome: Progressing     Problem: MOBILITY - ADULT  Goal: Maintain or return to baseline ADL function  Description: INTERVENTIONS:  -  Assess patient's ability to carry out ADLs; assess patient's baseline for ADL function and identify physical deficits which impact ability to perform ADLs (bathing, care of mouth/teeth, toileting, grooming, dressing, etc )  - Assess/evaluate cause of self-care deficits   - Assess range of motion  - Assess patient's mobility; develop plan if impaired  - Assess patient's need for assistive devices and provide as appropriate  - Encourage maximum independence but intervene and supervise when necessary  - Involve family in performance of ADLs  - Assess for home care needs following discharge   - Consider OT consult to assist with ADL evaluation and planning for discharge  - Provide patient education as appropriate  Outcome: Progressing  Goal: Maintains/Returns to pre admission functional level  Description: INTERVENTIONS:  - Perform BMAT or MOVE assessment daily    - Set and communicate daily mobility goal to care team and patient/family/caregiver  - Collaborate with rehabilitation services on mobility goals if consulted  - Perform Range of Motion 3 times a day  - Reposition patient every 2 hours    - Dangle patient 3 times a day  - Stand patient 3 times a day  - Ambulate patient 3 times a day  - Out of bed to chair 3 times a day   - Out of bed for meals 3 times a day  - Out of bed for toileting  - Record patient progress and toleration of activity level   Outcome: Progressing

## 2021-10-05 NOTE — ASSESSMENT & PLAN NOTE
· Chronic bilateral hydronephrosis with stent placement  · underwent conversion to retrograde nephroureterostomy tube  · Tea colored urine in ostomy bag since left PCN placement 9/20/21  · IR consulted for left pcn removal, now scheduled for 10/5  · In 10/2020 Hydronephrosis and hematuria 5/2021  S/P Nephrostomy tube placement 5/11/21       · tube rechecked by IR yesterday with removal

## 2021-10-05 NOTE — ASSESSMENT & PLAN NOTE
Patient has poor venous access, multiple attempts were made for obtaining IV access  Unfortunately patient is not candidate for PICC line due to advanced CKD    No further bleeding today   Removal of central line on discharge

## 2021-10-05 NOTE — DISCHARGE INSTR - AVS FIRST PAGE
Dear Justino Tenorio,     It was our pleasure to care for you here at [facility]  It is our hope that we were always able to exceed the expected standards for your care during your stay  You were hospitalized due to kidney disease  You were cared for on the 3rd floor by El Sood PA-C under the service of Luis Rasmussen DO with the [facility] who covers for your primary care physician (PCP), Luisa Tanner MD, while you were hospitalized  If you have any questions or concerns related to this hospitalization, you may contact us at [phone]  For follow up as well as any medication refills, we recommend that you follow up with your primary care physician  A registered nurse will reach out to you by phone within a few days after your discharge to answer any additional questions that you may have after going home  However, at this time we provide for you here, the most important instructions / recommendations at discharge:     · Notable Medication Adjustments -   · Please stop taking your Jakafi until you are able to follow up with your oncologist and nephrologist   · Testing Required after Discharge -   · None   · Important follow up information -   · Please follow-up with vascular surgery  Appointment has been scheduled for you  This appointment is in your discharge summary  · Please follow-up with your PCP within 3 weeks  · Piece follow-up with her oncologist within 3 weeks  · Please follow-up with her nephrologist within 3 weeks  · Other Instructions -   · Please elevate your right upper extremity to decrease swelling and apply Ace bandages as needed  Please return to the emergency department if you notice any worsening of right upper extremity swelling, pain, numbness, or tingling    · Please review this entire after visit summary as additional general instructions including medication list, appointments, activity, diet, any pertinent wound care, and other additional recommendations from your care team that may be provided for you        Sincerely,     Jill Kruse PA-C

## 2021-10-05 NOTE — PROGRESS NOTES
Vascular Surgery    Called to evaluate RUE swelling prior to discharge  Pt is s/p superficialization with transposition of R brachiobasilic fistula 7/27  Exam: RUE with dependent edema posterior upper arm  Forearm and hand are without edema  M/S intact  Hand warm and pink   +thrill/bruit/2+radial     Plan:  Irlanda Paulino for discharge from vascular perspective  Arm elevation and ACE wrap for edema control  Outpatient follow-up in the Vascular center as scheduled, Wednesday Oct 13, 2021 4:30 PM    Yancy Ortiz PA-C

## 2021-10-05 NOTE — PLAN OF CARE
Problem: Prexisting or High Potential for Compromised Skin Integrity  Goal: Skin integrity is maintained or improved  Description: INTERVENTIONS:  - Identify patients at risk for skin breakdown  - Assess and monitor skin integrity  - Assess and monitor nutrition and hydration status  - Monitor labs   - Assess for incontinence   - Turn and reposition patient  - Assist with mobility/ambulation  - Relieve pressure over bony prominences  - Avoid friction and shearing  - Provide appropriate hygiene as needed including keeping skin clean and dry  - Evaluate need for skin moisturizer/barrier cream  - Collaborate with interdisciplinary team   - Patient/family teaching  - Consider wound care consult   10/5/2021 1124 by Jade Espino RN  Outcome: Adequate for Discharge  10/5/2021 1026 by Jade Espino RN  Outcome: Progressing     Problem: Potential for Falls  Goal: Patient will remain free of falls  Description: INTERVENTIONS:  - Educate patient/family on patient safety including physical limitations  - Instruct patient to call for assistance with activity   - Consult OT/PT to assist with strengthening/mobility   - Keep Call bell within reach  - Keep bed low and locked with side rails adjusted as appropriate  - Keep care items and personal belongings within reach  - Initiate and maintain comfort rounds  - Make Fall Risk Sign visible to staff  - Offer Toileting every 2 Hours, in advance of need  - Initiate/Maintain no alarm  - Obtain necessary fall risk management equipment: none    - Apply yellow socks and bracelet for high fall risk patients  - Consider moving patient to room near nurses station  10/5/2021 1124 by Jade Espino RN  Outcome: Adequate for Discharge  10/5/2021 1026 by Jade Espino RN  Outcome: Progressing     Problem: PAIN - ADULT  Goal: Verbalizes/displays adequate comfort level or baseline comfort level  Description: Interventions:  - Encourage patient to monitor pain and request assistance  - Assess pain using appropriate pain scale  - Administer analgesics based on type and severity of pain and evaluate response  - Implement non-pharmacological measures as appropriate and evaluate response  - Consider cultural and social influences on pain and pain management  - Notify physician/advanced practitioner if interventions unsuccessful or patient reports new pain  10/5/2021 1124 by Doroteo Banks RN  Outcome: Adequate for Discharge  10/5/2021 1026 by Doroteo Banks RN  Outcome: Progressing     Problem: INFECTION - ADULT  Goal: Absence or prevention of progression during hospitalization  Description: INTERVENTIONS:  - Assess and monitor for signs and symptoms of infection  - Monitor lab/diagnostic results  - Monitor all insertion sites, i e  indwelling lines, tubes, and drains  - Monitor endotracheal if appropriate and nasal secretions for changes in amount and color  - Carpio appropriate cooling/warming therapies per order  - Administer medications as ordered  - Instruct and encourage patient and family to use good hand hygiene technique  - Identify and instruct in appropriate isolation precautions for identified infection/condition  10/5/2021 1124 by Doroteo Banks RN  Outcome: Adequate for Discharge  10/5/2021 1026 by Doroteo Banks RN  Outcome: Progressing     Problem: SAFETY ADULT  Goal: Patient will remain free of falls  Description: INTERVENTIONS:  - Educate patient/family on patient safety including physical limitations  - Instruct patient to call for assistance with activity   - Consult OT/PT to assist with strengthening/mobility   - Keep Call bell within reach  - Keep bed low and locked with side rails adjusted as appropriate  - Keep care items and personal belongings within reach  - Initiate and maintain comfort rounds  - Make Fall Risk Sign visible to staff  - Offer Toileting every 2 Hours, in advance of need  - Initiate/Maintain no   alarm  - Obtain necessary fall risk management equipment: none    - Apply yellow socks and bracelet for high fall risk patients  - Consider moving patient to room near nurses station  10/5/2021 1124 by Koffi Gibson RN  Outcome: Adequate for Discharge  10/5/2021 1026 by Koffi Gibson RN  Outcome: Progressing  Goal: Maintain or return to baseline ADL function  Description: INTERVENTIONS:  -  Assess patient's ability to carry out ADLs; assess patient's baseline for ADL function and identify physical deficits which impact ability to perform ADLs (bathing, care of mouth/teeth, toileting, grooming, dressing, etc )  - Assess/evaluate cause of self-care deficits   - Assess range of motion  - Assess patient's mobility; develop plan if impaired  - Assess patient's need for assistive devices and provide as appropriate  - Encourage maximum independence but intervene and supervise when necessary  - Involve family in performance of ADLs  - Assess for home care needs following discharge   - Consider OT consult to assist with ADL evaluation and planning for discharge  - Provide patient education as appropriate  10/5/2021 1124 by Koffi Gibson RN  Outcome: Adequate for Discharge  10/5/2021 1026 by Koffi Gibson RN  Outcome: Progressing  Goal: Maintains/Returns to pre admission functional level  Description: INTERVENTIONS:  - Perform BMAT or MOVE assessment daily    - Set and communicate daily mobility goal to care team and patient/family/caregiver  - Collaborate with rehabilitation services on mobility goals if consulted  - Perform Range of Motion 3 times a day  - Reposition patient every 2 hours    - Dangle patient 3 times a day  - Stand patient 3 times a day  - Ambulate patient 3 times a day  - Out of bed to chair 3 times a day   - Out of bed for meals 3 times a day  - Out of bed for toileting  - Record patient progress and toleration of activity level   10/5/2021 1124 by Koffi Gibson RN  Outcome: Adequate for Discharge  10/5/2021 1026 by Koffi Gibson RN  Outcome: Progressing     Problem: DISCHARGE PLANNING  Goal: Discharge to home or other facility with appropriate resources  Description: INTERVENTIONS:  - Identify barriers to discharge w/patient and caregiver  - Arrange for needed discharge resources and transportation as appropriate  - Identify discharge learning needs (meds, wound care, etc )  - Arrange for interpretive services to assist at discharge as needed  - Refer to Case Management Department for coordinating discharge planning if the patient needs post-hospital services based on physician/advanced practitioner order or complex needs related to functional status, cognitive ability, or social support system  10/5/2021 1124 by Deandre Rice RN  Outcome: Adequate for Discharge  10/5/2021 1026 by Deandre Rice RN  Outcome: Progressing     Problem: Knowledge Deficit  Goal: Patient/family/caregiver demonstrates understanding of disease process, treatment plan, medications, and discharge instructions  Description: Complete learning assessment and assess knowledge base    Interventions:  - Provide teaching at level of understanding  - Provide teaching via preferred learning methods  10/5/2021 1124 by Deandre Rice RN  Outcome: Adequate for Discharge  10/5/2021 1026 by Deandre Rice RN  Outcome: Progressing     Problem: MOBILITY - ADULT  Goal: Maintain or return to baseline ADL function  Description: INTERVENTIONS:  -  Assess patient's ability to carry out ADLs; assess patient's baseline for ADL function and identify physical deficits which impact ability to perform ADLs (bathing, care of mouth/teeth, toileting, grooming, dressing, etc )  - Assess/evaluate cause of self-care deficits   - Assess range of motion  - Assess patient's mobility; develop plan if impaired  - Assess patient's need for assistive devices and provide as appropriate  - Encourage maximum independence but intervene and supervise when necessary  - Involve family in performance of ADLs  - Assess for home care needs following discharge   - Consider OT consult to assist with ADL evaluation and planning for discharge  - Provide patient education as appropriate  10/5/2021 1124 by Florentino Ramos RN  Outcome: Adequate for Discharge  10/5/2021 1026 by Florentino Ramos RN  Outcome: Progressing  Goal: Maintains/Returns to pre admission functional level  Description: INTERVENTIONS:  - Perform BMAT or MOVE assessment daily    - Set and communicate daily mobility goal to care team and patient/family/caregiver  - Collaborate with rehabilitation services on mobility goals if consulted  - Perform Range of Motion 3 times a day  - Reposition patient every 2 hours    - Dangle patient 3 times a day  - Stand patient 3 times a day  - Ambulate patient 3 times a day  - Out of bed to chair 3 times a day   - Out of bed for meals 3 times a day  - Out of bed for toileting  - Record patient progress and toleration of activity level   10/5/2021 1124 by Florentino Ramos RN  Outcome: Adequate for Discharge  10/5/2021 1026 by Florentino Ramos RN  Outcome: Progressing

## 2021-10-05 NOTE — QUICK NOTE
Saw patient for increased swelling of RUE reported by prior RN  Patient reports swelling has been present however slightly worsened today  No pain  R Radial pulse 1+, neurovascular intact  Palpable thrill and audible bruit  Incision c/d/i with small area of firm ecchymosis lateral to this  D/w vascular resident, likely normal post op swelling  Continue elevation

## 2021-10-06 ENCOUNTER — PATIENT OUTREACH (OUTPATIENT)
Dept: CASE MANAGEMENT | Facility: HOSPITAL | Age: 76
End: 2021-10-06

## 2021-10-06 NOTE — UTILIZATION REVIEW
Notification of Discharge   This is a Notification of Discharge from our facility [address]  Please be advised that this patient has been discharge from our facility  Below you will find the admission and discharge date and time including the patients disposition  UTILIZATION REVIEW CONTACT:  Ayla Carvajal  Utilization   Network Utilization Review Department  Phone: [phone] x carefully listen to the prompts  All voicemails are confidential   Email: [email or web address]     PHYSICIAN ADVISORY SERVICES:  FOR SEPB-OT-XWIH REVIEW - MEDICAL NECESSITY DENIAL  Phone: [phone]  Fax: [phone]  Email: [email or web address]     PRESENTATION DATE: 9/30/2021 11:04 AM  OBERVATION ADMISSION DATE:   INPATIENT ADMISSION DATE: 10/1/21  4:06 PM   DISCHARGE DATE: 10/5/2021  1:49 PM  DISPOSITION: Home with [place] with [address] [place] [address] Road INFORMATION:  Send all requests for admission clinical reviews, approved or denied determinations and any other requests to dedicated fax number below belonging to the campus where the patient is receiving treatment   List of dedicated fax numbers:  [address] DENIALS (Administrative/Medical Necessity) [phone]   [address] (Maternity/NICU/Pediatrics) [phone]   Ryanne Flatten [phone]   Dylon Moss [phone]   Marcie De Luna [phone]   [facility] [address] [place] [address] [phone]   [facility]  [phone]   [address] [facility]  [address] [facility] [address] [facility] [phone]

## 2021-10-07 ENCOUNTER — TELEPHONE (OUTPATIENT)
Dept: VASCULAR SURGERY | Facility: CLINIC | Age: 76
End: 2021-10-07

## 2021-10-07 ENCOUNTER — HOSPITAL ENCOUNTER (OUTPATIENT)
Dept: INFUSION CENTER | Facility: HOSPITAL | Age: 76
Discharge: HOME | DRG: 981 | End: 2021-10-07
Attending: INTERNAL MEDICINE
Payer: COMMERCIAL

## 2021-10-07 VITALS — RESPIRATION RATE: 18 BRPM | DIASTOLIC BLOOD PRESSURE: 76 MMHG | HEART RATE: 76 BPM | SYSTOLIC BLOOD PRESSURE: 139 MMHG

## 2021-10-07 DIAGNOSIS — N18.5 ANEMIA IN STAGE 5 CHRONIC KIDNEY DISEASE, NOT ON CHRONIC DIALYSIS (HCC): ICD-10-CM

## 2021-10-07 DIAGNOSIS — D63.1 ANEMIA IN STAGE 5 CHRONIC KIDNEY DISEASE, NOT ON CHRONIC DIALYSIS (HCC): ICD-10-CM

## 2021-10-07 DIAGNOSIS — D50.0 IRON DEFICIENCY ANEMIA DUE TO CHRONIC BLOOD LOSS: Primary | ICD-10-CM

## 2021-10-07 PROCEDURE — 96372 THER/PROPH/DIAG INJ SC/IM: CPT

## 2021-10-07 RX ADMIN — DARBEPOETIN ALFA 100 MCG: 100 INJECTION, SOLUTION INTRAVENOUS; SUBCUTANEOUS at 15:03

## 2021-10-09 ENCOUNTER — APPOINTMENT (EMERGENCY)
Dept: RADIOLOGY | Facility: HOSPITAL | Age: 76
DRG: 981 | End: 2021-10-09
Payer: COMMERCIAL

## 2021-10-09 ENCOUNTER — HOSPITAL ENCOUNTER (INPATIENT)
Facility: HOSPITAL | Age: 76
LOS: 35 days | Discharge: NURSING, REHAB OR LONG-TERM CARE | DRG: 981 | End: 2021-11-13
Attending: EMERGENCY MEDICINE | Admitting: INTERNAL MEDICINE
Payer: COMMERCIAL

## 2021-10-09 DIAGNOSIS — D50.0 IRON DEFICIENCY ANEMIA DUE TO CHRONIC BLOOD LOSS: ICD-10-CM

## 2021-10-09 DIAGNOSIS — I48.91 ATRIAL FIBRILLATION (HCC): ICD-10-CM

## 2021-10-09 DIAGNOSIS — I95.9 HYPOTENSION: ICD-10-CM

## 2021-10-09 DIAGNOSIS — A41.9 SEPSIS (HCC): ICD-10-CM

## 2021-10-09 DIAGNOSIS — K21.9 GERD (GASTROESOPHAGEAL REFLUX DISEASE): ICD-10-CM

## 2021-10-09 DIAGNOSIS — S37.019A RENAL HEMATOMA: ICD-10-CM

## 2021-10-09 DIAGNOSIS — K92.1 MELENA: ICD-10-CM

## 2021-10-09 DIAGNOSIS — N15.1 PERINEPHRIC ABSCESS: Primary | ICD-10-CM

## 2021-10-09 DIAGNOSIS — J90 PLEURAL EFFUSION: ICD-10-CM

## 2021-10-09 DIAGNOSIS — R18.8 INTRA-ABDOMINAL COLLECTION: ICD-10-CM

## 2021-10-09 PROBLEM — S37.012A RENAL HEMATOMA, LEFT: Status: ACTIVE | Noted: 2021-10-09

## 2021-10-09 LAB
ALBUMIN SERPL BCP-MCNC: 2.1 G/DL (ref 3.5–5)
ALP SERPL-CCNC: 128 U/L (ref 46–116)
ALT SERPL W P-5'-P-CCNC: 11 U/L (ref 12–78)
ANION GAP SERPL CALCULATED.3IONS-SCNC: 12 MMOL/L (ref 4–13)
ANISOCYTOSIS BLD QL SMEAR: PRESENT
APTT PPP: 35 SECONDS (ref 23–37)
AST SERPL W P-5'-P-CCNC: 63 U/L (ref 5–45)
ATRIAL RATE: 114 BPM
BASOPHILS # BLD MANUAL: 0 THOUSAND/UL (ref 0–0.1)
BASOPHILS NFR MAR MANUAL: 0 % (ref 0–1)
BILIRUB SERPL-MCNC: 1.26 MG/DL (ref 0.2–1)
BUN SERPL-MCNC: 37 MG/DL (ref 5–25)
BURR CELLS BLD QL SMEAR: PRESENT
CALCIUM ALBUM COR SERPL-MCNC: 10.8 MG/DL (ref 8.3–10.1)
CALCIUM SERPL-MCNC: 9.3 MG/DL (ref 8.3–10.1)
CHLORIDE SERPL-SCNC: 110 MMOL/L (ref 100–108)
CO2 SERPL-SCNC: 16 MMOL/L (ref 21–32)
CREAT SERPL-MCNC: 3.26 MG/DL (ref 0.6–1.3)
EOSINOPHIL # BLD MANUAL: 0 THOUSAND/UL (ref 0–0.4)
EOSINOPHIL NFR BLD MANUAL: 0 % (ref 0–6)
ERYTHROCYTE [DISTWIDTH] IN BLOOD BY AUTOMATED COUNT: 16.4 % (ref 11.6–15.1)
GFR SERPL CREATININE-BSD FRML MDRD: 13 ML/MIN/1.73SQ M
GLUCOSE SERPL-MCNC: 100 MG/DL (ref 65–140)
HCT VFR BLD AUTO: 29.8 % (ref 34.8–46.1)
HGB BLD-MCNC: 9.3 G/DL (ref 11.5–15.4)
INR PPP: 1.63 (ref 0.84–1.19)
LACTATE SERPL-SCNC: 1.7 MMOL/L (ref 0.5–2)
LIPASE SERPL-CCNC: 84 U/L (ref 73–393)
LYMPHOCYTES # BLD AUTO: 0.06 THOUSAND/UL (ref 0.6–4.47)
LYMPHOCYTES # BLD AUTO: 1 % (ref 14–44)
MCH RBC QN AUTO: 29 PG (ref 26.8–34.3)
MCHC RBC AUTO-ENTMCNC: 31.2 G/DL (ref 31.4–37.4)
MCV RBC AUTO: 93 FL (ref 82–98)
MONOCYTES # BLD AUTO: 0.31 THOUSAND/UL (ref 0–1.22)
MONOCYTES NFR BLD: 5 % (ref 4–12)
NEUTROPHILS # BLD MANUAL: 5.78 THOUSAND/UL (ref 1.85–7.62)
NEUTS BAND NFR BLD MANUAL: 8 % (ref 0–8)
NEUTS SEG NFR BLD AUTO: 86 % (ref 43–75)
NT-PROBNP SERPL-MCNC: 9655 PG/ML
OVALOCYTES BLD QL SMEAR: PRESENT
P AXIS: 44 DEGREES
PLATELET # BLD AUTO: 276 THOUSANDS/UL (ref 149–390)
PLATELET BLD QL SMEAR: ADEQUATE
PMV BLD AUTO: 10.1 FL (ref 8.9–12.7)
POLYCHROMASIA BLD QL SMEAR: PRESENT
POTASSIUM SERPL-SCNC: 3.2 MMOL/L (ref 3.5–5.3)
PR INTERVAL: 116 MS
PROCALCITONIN SERPL-MCNC: 21.02 NG/ML
PROT SERPL-MCNC: 6.4 G/DL (ref 6.4–8.2)
PROTHROMBIN TIME: 18.6 SECONDS (ref 11.6–14.5)
QRS AXIS: 29 DEGREES
QRSD INTERVAL: 82 MS
QT INTERVAL: 346 MS
QTC INTERVAL: 476 MS
RBC # BLD AUTO: 3.21 MILLION/UL (ref 3.81–5.12)
SODIUM SERPL-SCNC: 138 MMOL/L (ref 136–145)
T WAVE AXIS: 16 DEGREES
TROPONIN I SERPL-MCNC: 0.09 NG/ML
VENTRICULAR RATE: 114 BPM
WBC # BLD AUTO: 6.15 THOUSAND/UL (ref 4.31–10.16)

## 2021-10-09 PROCEDURE — 83880 ASSAY OF NATRIURETIC PEPTIDE: CPT | Performed by: EMERGENCY MEDICINE

## 2021-10-09 PROCEDURE — G1004 CDSM NDSC: HCPCS

## 2021-10-09 PROCEDURE — 99285 EMERGENCY DEPT VISIT HI MDM: CPT

## 2021-10-09 PROCEDURE — 93010 ELECTROCARDIOGRAM REPORT: CPT | Performed by: INTERNAL MEDICINE

## 2021-10-09 PROCEDURE — 36556 INSERT NON-TUNNEL CV CATH: CPT | Performed by: EMERGENCY MEDICINE

## 2021-10-09 PROCEDURE — 71045 X-RAY EXAM CHEST 1 VIEW: CPT

## 2021-10-09 PROCEDURE — 96375 TX/PRO/DX INJ NEW DRUG ADDON: CPT

## 2021-10-09 PROCEDURE — 84484 ASSAY OF TROPONIN QUANT: CPT | Performed by: EMERGENCY MEDICINE

## 2021-10-09 PROCEDURE — 87040 BLOOD CULTURE FOR BACTERIA: CPT | Performed by: EMERGENCY MEDICINE

## 2021-10-09 PROCEDURE — 93005 ELECTROCARDIOGRAM TRACING: CPT

## 2021-10-09 PROCEDURE — 99291 CRITICAL CARE FIRST HOUR: CPT | Performed by: EMERGENCY MEDICINE

## 2021-10-09 PROCEDURE — 84145 PROCALCITONIN (PCT): CPT | Performed by: EMERGENCY MEDICINE

## 2021-10-09 PROCEDURE — 06HY33Z INSERTION OF INFUSION DEVICE INTO LOWER VEIN, PERCUTANEOUS APPROACH: ICD-10-PCS | Performed by: EMERGENCY MEDICINE

## 2021-10-09 PROCEDURE — 87185 SC STD ENZYME DETCJ PER NZM: CPT | Performed by: EMERGENCY MEDICINE

## 2021-10-09 PROCEDURE — 83605 ASSAY OF LACTIC ACID: CPT | Performed by: EMERGENCY MEDICINE

## 2021-10-09 PROCEDURE — 85610 PROTHROMBIN TIME: CPT | Performed by: EMERGENCY MEDICINE

## 2021-10-09 PROCEDURE — 99223 1ST HOSP IP/OBS HIGH 75: CPT | Performed by: INTERNAL MEDICINE

## 2021-10-09 PROCEDURE — 76937 US GUIDE VASCULAR ACCESS: CPT | Performed by: EMERGENCY MEDICINE

## 2021-10-09 PROCEDURE — 36415 COLL VENOUS BLD VENIPUNCTURE: CPT | Performed by: EMERGENCY MEDICINE

## 2021-10-09 PROCEDURE — 80053 COMPREHEN METABOLIC PANEL: CPT | Performed by: EMERGENCY MEDICINE

## 2021-10-09 PROCEDURE — 83690 ASSAY OF LIPASE: CPT | Performed by: EMERGENCY MEDICINE

## 2021-10-09 PROCEDURE — 96374 THER/PROPH/DIAG INJ IV PUSH: CPT

## 2021-10-09 PROCEDURE — NC001 PR NO CHARGE: Performed by: RADIOLOGY

## 2021-10-09 PROCEDURE — 85007 BL SMEAR W/DIFF WBC COUNT: CPT | Performed by: EMERGENCY MEDICINE

## 2021-10-09 PROCEDURE — 85730 THROMBOPLASTIN TIME PARTIAL: CPT | Performed by: EMERGENCY MEDICINE

## 2021-10-09 PROCEDURE — 74176 CT ABD & PELVIS W/O CONTRAST: CPT

## 2021-10-09 PROCEDURE — 85027 COMPLETE CBC AUTOMATED: CPT | Performed by: EMERGENCY MEDICINE

## 2021-10-09 RX ORDER — MAGNESIUM HYDROXIDE/ALUMINUM HYDROXICE/SIMETHICONE 120; 1200; 1200 MG/30ML; MG/30ML; MG/30ML
30 SUSPENSION ORAL EVERY 6 HOURS PRN
Status: DISCONTINUED | OUTPATIENT
Start: 2021-10-09 | End: 2021-10-30

## 2021-10-09 RX ORDER — ALBUMIN (HUMAN) 12.5 G/50ML
25 SOLUTION INTRAVENOUS
Status: DISCONTINUED | OUTPATIENT
Start: 2021-10-09 | End: 2021-10-09

## 2021-10-09 RX ORDER — SENNOSIDES 8.6 MG
1 TABLET ORAL DAILY
Status: DISCONTINUED | OUTPATIENT
Start: 2021-10-10 | End: 2021-10-30

## 2021-10-09 RX ORDER — CHOLECALCIFEROL (VITAMIN D3) 10(400)/ML
800 DROPS ORAL DAILY
Status: DISCONTINUED | OUTPATIENT
Start: 2021-10-10 | End: 2021-11-06

## 2021-10-09 RX ORDER — CITALOPRAM 20 MG/1
20 TABLET ORAL DAILY
Status: DISCONTINUED | OUTPATIENT
Start: 2021-10-10 | End: 2021-11-13 | Stop reason: HOSPADM

## 2021-10-09 RX ORDER — VANCOMYCIN HYDROCHLORIDE 1 G/200ML
15 INJECTION, SOLUTION INTRAVENOUS DAILY PRN
Status: DISCONTINUED | OUTPATIENT
Start: 2021-10-09 | End: 2021-10-10

## 2021-10-09 RX ORDER — ACETAMINOPHEN 325 MG/1
650 TABLET ORAL EVERY 6 HOURS PRN
Status: DISCONTINUED | OUTPATIENT
Start: 2021-10-09 | End: 2021-11-13 | Stop reason: HOSPADM

## 2021-10-09 RX ORDER — ONDANSETRON 2 MG/ML
4 INJECTION INTRAMUSCULAR; INTRAVENOUS EVERY 6 HOURS PRN
Status: DISCONTINUED | OUTPATIENT
Start: 2021-10-09 | End: 2021-11-13 | Stop reason: HOSPADM

## 2021-10-09 RX ORDER — SODIUM BICARBONATE 650 MG/1
650 TABLET ORAL
Status: DISCONTINUED | OUTPATIENT
Start: 2021-10-09 | End: 2021-10-15

## 2021-10-09 RX ORDER — FENTANYL CITRATE 50 UG/ML
25 INJECTION, SOLUTION INTRAMUSCULAR; INTRAVENOUS EVERY 2 HOUR PRN
Status: DISCONTINUED | OUTPATIENT
Start: 2021-10-09 | End: 2021-10-14

## 2021-10-09 RX ORDER — SODIUM CHLORIDE 9 MG/ML
50 INJECTION, SOLUTION INTRAVENOUS CONTINUOUS
Status: DISCONTINUED | OUTPATIENT
Start: 2021-10-09 | End: 2021-10-09

## 2021-10-09 RX ORDER — LEVOTHYROXINE SODIUM 0.07 MG/1
75 TABLET ORAL DAILY
Status: DISCONTINUED | OUTPATIENT
Start: 2021-10-10 | End: 2021-11-13 | Stop reason: HOSPADM

## 2021-10-09 RX ORDER — DOCUSATE SODIUM 100 MG/1
100 CAPSULE, LIQUID FILLED ORAL 2 TIMES DAILY
Status: DISCONTINUED | OUTPATIENT
Start: 2021-10-09 | End: 2021-10-30

## 2021-10-09 RX ORDER — SACCHAROMYCES BOULARDII 250 MG
250 CAPSULE ORAL DAILY
Status: DISCONTINUED | OUTPATIENT
Start: 2021-10-10 | End: 2021-11-13 | Stop reason: HOSPADM

## 2021-10-09 RX ORDER — VANCOMYCIN HYDROCHLORIDE 1 G/200ML
15 INJECTION, SOLUTION INTRAVENOUS EVERY 12 HOURS
Status: DISCONTINUED | OUTPATIENT
Start: 2021-10-09 | End: 2021-10-09

## 2021-10-09 RX ORDER — ATORVASTATIN CALCIUM 40 MG/1
40 TABLET, FILM COATED ORAL
Status: DISCONTINUED | OUTPATIENT
Start: 2021-10-09 | End: 2021-11-13 | Stop reason: HOSPADM

## 2021-10-09 RX ORDER — ALBUMIN (HUMAN) 12.5 G/50ML
25 SOLUTION INTRAVENOUS
Status: COMPLETED | OUTPATIENT
Start: 2021-10-09 | End: 2021-10-10

## 2021-10-09 RX ORDER — SODIUM CHLORIDE, SODIUM GLUCONATE, SODIUM ACETATE, POTASSIUM CHLORIDE, MAGNESIUM CHLORIDE, SODIUM PHOSPHATE, DIBASIC, AND POTASSIUM PHOSPHATE .53; .5; .37; .037; .03; .012; .00082 G/100ML; G/100ML; G/100ML; G/100ML; G/100ML; G/100ML; G/100ML
500 INJECTION, SOLUTION INTRAVENOUS ONCE
Status: COMPLETED | OUTPATIENT
Start: 2021-10-09 | End: 2021-10-09

## 2021-10-09 RX ORDER — LANOLIN ALCOHOL/MO/W.PET/CERES
3 CREAM (GRAM) TOPICAL
Status: DISCONTINUED | OUTPATIENT
Start: 2021-10-09 | End: 2021-11-13 | Stop reason: HOSPADM

## 2021-10-09 RX ORDER — CALCITRIOL 0.25 UG/1
0.25 CAPSULE, LIQUID FILLED ORAL DAILY
Status: DISCONTINUED | OUTPATIENT
Start: 2021-10-10 | End: 2021-11-13 | Stop reason: HOSPADM

## 2021-10-09 RX ORDER — ACETAMINOPHEN 325 MG/1
975 TABLET ORAL EVERY 8 HOURS SCHEDULED
Status: DISCONTINUED | OUTPATIENT
Start: 2021-10-09 | End: 2021-10-18

## 2021-10-09 RX ORDER — POTASSIUM CHLORIDE 14.9 MG/ML
20 INJECTION INTRAVENOUS ONCE
Status: COMPLETED | OUTPATIENT
Start: 2021-10-09 | End: 2021-10-10

## 2021-10-09 RX ORDER — FUROSEMIDE 20 MG/1
20 TABLET ORAL DAILY
Status: DISCONTINUED | OUTPATIENT
Start: 2021-10-10 | End: 2021-10-11

## 2021-10-09 RX ADMIN — DEXTROSE 1000 MG: 50 INJECTION, SOLUTION INTRAVENOUS at 18:31

## 2021-10-09 RX ADMIN — ALBUMIN (HUMAN) 25 G: 0.25 INJECTION, SOLUTION INTRAVENOUS at 23:38

## 2021-10-09 RX ADMIN — MELATONIN TAB 3 MG 3 MG: 3 TAB at 22:32

## 2021-10-09 RX ADMIN — METOPROLOL TARTRATE 25 MG: 25 TABLET, FILM COATED ORAL at 22:32

## 2021-10-09 RX ADMIN — ATORVASTATIN CALCIUM 40 MG: 40 TABLET, FILM COATED ORAL at 22:31

## 2021-10-09 RX ADMIN — SODIUM BICARBONATE 650 MG TABLET 650 MG: at 22:31

## 2021-10-09 RX ADMIN — SODIUM CHLORIDE, SODIUM GLUCONATE, SODIUM ACETATE, POTASSIUM CHLORIDE, MAGNESIUM CHLORIDE, SODIUM PHOSPHATE, DIBASIC, AND POTASSIUM PHOSPHATE 500 ML: .53; .5; .37; .037; .03; .012; .00082 INJECTION, SOLUTION INTRAVENOUS at 18:22

## 2021-10-09 RX ADMIN — ACETAMINOPHEN 975 MG: 325 TABLET, FILM COATED ORAL at 22:34

## 2021-10-09 RX ADMIN — POTASSIUM CHLORIDE 20 MEQ: 14.9 INJECTION, SOLUTION INTRAVENOUS at 22:36

## 2021-10-09 RX ADMIN — DOCUSATE SODIUM 100 MG: 100 CAPSULE, LIQUID FILLED ORAL at 22:32

## 2021-10-09 RX ADMIN — VANCOMYCIN HYDROCHLORIDE 1250 MG: 10 INJECTION, POWDER, LYOPHILIZED, FOR SOLUTION INTRAVENOUS at 19:53

## 2021-10-09 NOTE — ED PROVIDER NOTES
History  Chief Complaint   Patient presents with    Abdominal Pain     LLQ abdominal pain, recent nephrostomy removal, fever, recent ALICIA fistula placed with swelling noted  Patient is a 77year old female with a past medical history of HTN, CKD, polycythemia vera, and chronic PE who presents for evaluation of abdominal pain and fever  Patient recently had urostomy tube placed, as well as recent superficialization of RUE AV fistula  Patient currently endorsing abdominal pain, especially in the LLQ of her abdomen  Denies any radiation of this pain  Endorses associated nausea, vomiting, and watery diarrhea  Patient febrile with a max temp PTA of 102F  Patient has had multiple abdominal surgeries previously  Currently denies any chest pain or shortness of breath  History provided by:  Patient and medical records   used: No    Abdominal Pain  Pain location:  LLQ  Pain radiates to:  Does not radiate  Associated symptoms: diarrhea, fever, nausea and vomiting    Associated symptoms: no chest pain, no chills and no shortness of breath    Diarrhea:     Quality:  Watery  Fever:     Max temp PTA:  102  Risk factors: being elderly, multiple surgeries, obesity and recent hospitalization        Prior to Admission Medications   Prescriptions Last Dose Informant Patient Reported? Taking?    Cholecalciferol (VITAMIN D3) 1000 UNITS CAPS  Self Yes No   Sig: Take by mouth daily 1000 IU   acetaminophen (TYLENOL) 325 mg tablet  Self No No   Si mg every 6 hours as needed for mild pain or headache   apixaban (Eliquis) 2 5 mg  Self No No   Sig: Take 1 tablet (2 5 mg total) by mouth 2 (two) times a day   Patient taking differently: Take 2 5 mg by mouth daily    atorvastatin (LIPITOR) 40 mg tablet  Self No No   Sig: Take 1 tablet (40 mg total) by mouth daily at bedtime   calcitriol (ROCALTROL) 0 25 mcg capsule  Self No No   Sig: Take 1 capsule (0 25 mcg total) by mouth daily   citalopram (CeleXA) 20 mg tablet  Self No No   Sig: Take 1 tablet (20 mg total) by mouth daily   levothyroxine 75 mcg tablet  Self No No   Sig: Take 1 tablet (75 mcg total) by mouth daily   loperamide (IMODIUM) 2 mg capsule  Self No No   Sig: Take 1 capsule (2 mg total) by mouth 4 (four) times a day as needed for diarrhea   melatonin 3 mg  Self No No   Sig: Take 1 tablet (3 mg total) by mouth daily at bedtime   metoprolol tartrate (LOPRESSOR) 25 mg tablet  Self No No   Sig: Take 1 tablet (25 mg total) by mouth 2 (two) times a day   saccharomyces boulardii (Florastor) 250 mg capsule  Self No No   Sig: Take 1 capsule (250 mg total) by mouth daily   Patient taking differently: Take 250 mg by mouth daily as needed    sodium bicarbonate 650 mg tablet  Self No No   Sig: Take 1 tablet (650 mg total) by mouth 2 (two) times daily after meals      Facility-Administered Medications: None       Past Medical History:   Diagnosis Date    Anxiety     Bowel obstruction (HCC)     Chronic kidney disease     Chronic kidney disease (CKD), stage IV (severe) (HCC)     stage IV    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    Circulation problem     Compression fracture of cervical spine (HCC)     COVID-19 07/2021    hospitalized     Hernia of abdominal cavity     History of kidney problems     History of transfusion     Hydronephrosis     Hypertension     IBS (irritable bowel syndrome)     Incontinence     Lung mass     Improving on PET/CT 1/2016    Polycythemia vera ([address] )     Pulmonary embolism ([address] ) 2014    Shingles     Urinary tract infection        Past Surgical History:   Procedure Laterality Date    ABDOMINAL ADHESION SURGERY N/A 8/9/2020    Procedure: LYSIS ADHESIONS;  Surgeon: Lauren Troncoso DO;  [facility];  Service: General    BLADDER SUSPENSION      BOTOX INJECTION N/A 7/27/2016    Procedure: BOTOX INJECTION ;  Surgeon: Marvel Quiroz MD;  [facility];  Service:     CHOLECYSTECTOMY N/A     COLONOSCOPY      CYSTECTOMY, RADICAL WITH ILEOCONDUIT N/A 10/4/2016    Procedure: SUPRATRIGONAL CYSTECTOMY WITH ILEAL CONDUIT ;  Surgeon: Christie Medina MD;  [facility];  Service:    Stacey Marcos CYSTOSCOPY W/ RETROGRADES Bilateral 7/27/2016    Procedure: CYSTOSCOPY; RETROGRADE PYELOGRAM ;  Surgeon: Christie Medina MD;  [facility];  Service:     HERNIA REPAIR      IR AV FISTULAGRAM/GRAFTOGRAM  2/10/2021    IR NEPHROSTOMY TO NEPHROURETERAL STENT  5/15/2021    IR NEPHROSTOMY TO NEPHROURETERAL STENT  6/9/2021    IR NEPHROSTOMY TUBE CHECK AND/OR REMOVAL  6/16/2021    IR NEPHROSTOMY TUBE CHECK/CHANGE/REPOSITION/REINSERTION/UPSIZE  5/14/2021    IR NEPHROSTOMY TUBE CHECK/CHANGE/REPOSITION/REINSERTION/UPSIZE  5/21/2021    IR NEPHROSTOMY TUBE CHECK/CHANGE/REPOSITION/REINSERTION/UPSIZE  9/16/2021    IR NEPHROSTOMY TUBE CHECK/CHANGE/REPOSITION/REINSERTION/UPSIZE  10/4/2021    IR NEPHROSTOMY TUBE PLACEMENT  5/10/2021    IR NEPHROSTOMY TUBE PLACEMENT  9/20/2021    IR NON-TUNNELED CENTRAL LINE PLACEMENT  7/17/2020    IR NON-TUNNELED CENTRAL LINE PLACEMENT  8/14/2020    IR NON-TUNNELED CENTRAL LINE PLACEMENT  7/16/2021    LAPAROTOMY N/A 8/9/2020    Procedure: LAPAROTOMY EXPLORATORY, PARASTOMAL HERNIA REPAIR WITH MESH;  Surgeon: Ramirez Whitfield DO;  [facility];  Service: General    CT ANASTOMOSIS,AV,ANY SITE Right 1/5/2021    Procedure: Creation of right brachiobasilic fistula; Surgeon: Delpha Romberg Doctor, MD;  [facility];  Service: Vascular    CT COLONOSCOPY FLX DX W/COLLJ SPEC WHEN PFRMD N/A 8/31/2016    Procedure: COLONOSCOPY;  Surgeon: Queenie Flanagan MD;  [facility];   Service: Gastroenterology    CT CYSTOSCOPY,INSERT URETERAL STENT Bilateral 7/27/2016    Procedure: STENT INSERTION; EXCISION OF MESH ;  Surgeon: Christie Medina MD;  [facility];  Service: Urology    CT REVISE AV FISTULA,W/O THROMBECTOMY Right 9/30/2021    Procedure: Superficialization and transposition of right brachiobasilic fistula; Surgeon: Yamini Chairez MD;  [facility];  Service: Vascular    TONSILLECTOMY      TUBAL LIGATION      WISDOM TOOTH EXTRACTION         Family History   Problem Relation Age of Onset    Cancer Mother         small cell cancer     Heart disease Father     COPD Father     Heart disease Brother     Nephrolithiasis Brother      I have reviewed and agree with the history as documented  E-Cigarette/Vaping    E-Cigarette Use Never User      E-Cigarette/Vaping Substances    Nicotine No     THC No     CBD No     Flavoring No     Other No     Unknown No      Social History     Tobacco Use    Smoking status: Never Smoker    Smokeless tobacco: Never Used    Tobacco comment: n/a   Vaping Use    Vaping Use: Never used   Substance Use Topics    Alcohol use: Not Currently     Comment: n/s    Drug use: Not Currently     Comment: n/a        Review of Systems   Constitutional: Positive for fever  Negative for chills  HENT: Negative  Respiratory: Negative for shortness of breath  Cardiovascular: Negative for chest pain  Gastrointestinal: Positive for abdominal pain, diarrhea, nausea and vomiting  Musculoskeletal: Negative  Skin: Negative  Neurological: Negative  All other systems reviewed and are negative  Physical Exam  ED Triage Vitals [10/09/21 1427]   Temperature Pulse Respirations Blood Pressure SpO2   97 8 °F (36 6 °C) (!) 114 20 150/67 94 %      Temp Source Heart Rate Source Patient Position - Orthostatic VS BP Location FiO2 (%)   Oral -- -- -- --      Pain Score       No Pain             Orthostatic Vital Signs  Vitals:    10/09/21 1427 10/09/21 1530 10/09/21 1700   BP: 150/67 107/57 135/60   Pulse: (!) 114 (!) 106 100       Physical Exam  Vitals and nursing note reviewed  Constitutional:       General: She is awake  Appearance: She is ill-appearing  She is not diaphoretic     HENT:      Head: Normocephalic and atraumatic  Mouth/Throat:      Lips: Pink  Mouth: Mucous membranes are dry  Eyes:      General: Vision grossly intact  Gaze aligned appropriately  Cardiovascular:      Rate and Rhythm: Regular rhythm  Tachycardia present  Heart sounds: Normal heart sounds  Comments: Fistula in RUE with good thrill  Pulmonary:      Effort: Pulmonary effort is normal  No respiratory distress  Breath sounds: Normal breath sounds  Abdominal:      General: There is no distension  Palpations: Abdomen is soft  Tenderness: There is abdominal tenderness (mostly over left side of abdomen, but mild tenderness throughout)  There is no guarding or rebound  Comments: Patient has urostomy tube exiting from anterior abdomen  Surrounding skin appears normal, no erythema or swelling  Musculoskeletal:      Cervical back: Full passive range of motion without pain and neck supple  Right lower leg: No edema  Left lower leg: No edema  Skin:     General: Skin is warm and dry  Comments: RUE swollen with overlying skin changes  Sutures in place from AV fistula procedure  Surrounding skin not erythematous or warm to the touch  Areas of fluctuance in the tissue surrounding sutures  Small stage 2 pressure ulcer on superior buttock region  No surrounding erythema or warmth  No drainage  Neurological:      General: No focal deficit present  Mental Status: She is alert and oriented to person, place, and time           ED Medications  Medications   multi-electrolyte (ISOLYTE-S PH 7 4) bolus 500 mL (500 mL Intravenous New Bag 10/9/21 1822)   ceftriaxone (ROCEPHIN) 1 g/50 mL in dextrose IVPB (1,000 mg Intravenous New Bag 10/9/21 1831)   vancomycin (VANCOCIN) 1,250 mg in sodium chloride 0 9 % 250 mL IVPB (has no administration in time range)       Diagnostic Studies  Results Reviewed     Procedure Component Value Units Date/Time    Blood culture #1 [[id number]] Updated: 10/09/21 1646    Lab Status: In process Specimen: Blood     Procalcitonin with AM Reflex [[id number]]  (Abnormal) Collected: 10/09/21 1447    Lab Status: Final result Specimen: Blood from Arm, Right Updated: 10/09/21 1635     Procalcitonin 21 02 ng/ml     Procalcitonin Reflex [[id number]]     Lab Status: No result Specimen: Blood     CBC and differential [[id number]]  (Abnormal) Collected: 10/09/21 1447    Lab Status: Final result Specimen: Blood from Arm, Right Updated: 10/09/21 1603     WBC 6 15 Thousand/uL      RBC 3 21 Million/uL      Hemoglobin 9 3 g/dL      Hematocrit 29 8 %      MCV 93 fL      MCH 29 0 pg      MCHC 31 2 g/dL      RDW 16 4 %      MPV 10 1 fL      Platelets 545 Thousands/uL     Narrative: This is an appended report  These results have been appended to a previously verified report  Manual Differential(PHLEBS Do Not Order) [[id number]]  (Abnormal) Collected: 10/09/21 1447    Lab Status: Final result Specimen: Blood from Arm, Right Updated: 10/09/21 1603     Segmented % 86 %      Bands % 8 %      Lymphocytes % 1 %      Monocytes % 5 %      Eosinophils, % 0 %      Basophils % 0 %      Absolute Neutrophils 5 78 Thousand/uL      Lymphocytes Absolute 0 06 Thousand/uL      Monocytes Absolute 0 31 Thousand/uL      Eosinophils Absolute 0 00 Thousand/uL      Basophils Absolute 0 00 Thousand/uL      Total Counted --     Anisocytosis Present     Nazareth Cells Present     Ovalocytes Present     Polychromasia Present     Platelet Estimate Adequate    Lactic acid [[id number]]  (Normal) Collected: 10/09/21 1447    Lab Status: Final result Specimen: Blood from Arm, Right Updated: 10/09/21 1539     LACTIC ACID 1 7 mmol/L     Narrative:      Result may be elevated if tourniquet was used during collection      NT-BNP PRO [[id number]]  (Abnormal) Collected: 10/09/21 1447    Lab Status: Final result Specimen: Blood from Arm, Right Updated: 10/09/21 1535     NT-proBNP 9,655 pg/mL     Troponin I [[id number]]  (Abnormal) Collected: 10/09/21 1447    Lab Status: Final result Specimen: Blood from Arm, Right Updated: 10/09/21 1534     Troponin I 0 09 ng/mL     Comprehensive metabolic panel [[id number]]  (Abnormal) Collected: 10/09/21 1447    Lab Status: Final result Specimen: Blood from Arm, Right Updated: 10/09/21 1534     Sodium 138 mmol/L      Potassium 3 2 mmol/L      Chloride 110 mmol/L      CO2 16 mmol/L      ANION GAP 12 mmol/L      BUN 37 mg/dL      Creatinine 3 26 mg/dL      Glucose 100 mg/dL      Calcium 9 3 mg/dL      Corrected Calcium 10 8 mg/dL      AST 63 U/L      ALT 11 U/L      Alkaline Phosphatase 128 U/L      Total Protein 6 4 g/dL      Albumin 2 1 g/dL      Total Bilirubin 1 26 mg/dL      eGFR 13 ml/min/1 73sq m     Narrative:      [place] guidelines for Chronic Kidney Disease (CKD):     Stage 1 with normal or high GFR (GFR > 90 mL/min/1 73 square meters)    Stage 2 Mild CKD (GFR = 60-89 mL/min/1 73 square meters)    Stage 3A Moderate CKD (GFR = 45-59 mL/min/1 73 square meters)    Stage 3B Moderate CKD (GFR = 30-44 mL/min/1 73 square meters)    Stage 4 Severe CKD (GFR = 15-29 mL/min/1 73 square meters)    Stage 5 End Stage CKD (GFR <15 mL/min/1 73 square meters)  Note: GFR calculation is accurate only with a steady state creatinine    Lipase [[id number]]  (Normal) Collected: 10/09/21 1447    Lab Status: Final result Specimen: Blood from Arm, Right Updated: 10/09/21 1534     Lipase 84 u/L     Protime-INR [[id number]]  (Abnormal) Collected: 10/09/21 1447    Lab Status: Final result Specimen: Blood from Arm, Right Updated: 10/09/21 1534     Protime 18 6 seconds      INR 1 63    APTT [[id number]]  (Normal) Collected: 10/09/21 1447    Lab Status: Final result Specimen: Blood from Arm, Right Updated: 10/09/21 1534     PTT 35 seconds     Blood culture #2 [[id number]] Collected: 10/09/21 1447    Lab Status:  In process Specimen: Blood from Arm, Right Updated: 10/09/21 1507    UA w Reflex to Microscopic w Reflex to Culture [[id number]]     Lab Status: No result Specimen: Urine                  CT abdomen pelvis wo contrast   Final Result by Neal Hernández MD (10/09 1625)      In the left renal fossa, there is a collection of mixed intermediate and high attenuation suggesting a hematoma with some bubbles of gas  Pigtail catheter is noted medial to the kidney  The renal pelvis may be collapsed  There appears to be some    hemorrhage and thickening extending in the combined interfascial plane of the retroperitoneum as well as some high density fluid within the pelvis suggesting hemoperitoneum  Superinfection of the kidney is possible  Urology evaluation is advised  Nonobstructing calculi in the right kidney  Some prominent loops of bowel are more likely large bowel suggesting ileus  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  This was discussed with Dr Ariana Ovalle at 4:20 PM               Workstation performed: [id number]         XR chest portable    (Results Pending)         Procedures  Central Line    Date/Time: 10/9/2021 6:01 PM  Performed by: Bhavna Archer DO  Authorized by: Bhavna Archer DO     Patient location:  ED  Other Assisting Provider: Yes (comment) (Dr Suzette Thompson)    Consent:     Consent obtained:  Written    Consent given by:  Patient    Risks discussed:  Arterial puncture, bleeding, incorrect placement, infection, nerve damage and pneumothorax    Alternatives discussed:  Delayed treatment and alternative treatment  Universal protocol:     Procedure explained and questions answered to patient or proxy's satisfaction: yes      Relevant documents present and verified: yes      Test results available and properly labeled: yes      Radiology Images displayed and confirmed    If images not available, report reviewed: yes      Required blood products, implants, devices, and special equipment available: yes Site/side marked: yes      Immediately prior to procedure, a time out was called: yes      Patient identity confirmed:  Arm band  Pre-procedure details:     Hand hygiene: Hand hygiene performed prior to insertion      Sterile barrier technique: All elements of maximal sterile technique followed      Skin preparation:  2% chlorhexidine    Skin preparation agent: Skin preparation agent completely dried prior to procedure    Indications:     Central line indications: no peripheral vascular access    Anesthesia (see MAR for exact dosages): Anesthesia method:  Local infiltration    Local anesthetic:  Lidocaine 1% w/o epi  Procedure details:     Location:  Left femoral    Vessel type: vein      Laterality:  Left    Approach: percutaneous technique used      Patient position:  Flat    Catheter type:  Triple lumen 20cm    Landmarks identified: yes      Ultrasound guidance: yes      Ultrasound image availability:  Images available in PACS    Sterile ultrasound techniques: Sterile gel and sterile probe covers were used      Manometry confirmation: no      Number of attempts:  2    Successful placement: yes    Post-procedure details:     Post-procedure:  Line sutured and dressing applied    Assessment:  Blood return through all ports and free fluid flow    Post-procedure complications: local hematoma      Patient tolerance of procedure: Tolerated well, no immediate complications  Comments:      Initially attempted R IJ line  Local hematoma formation, unable to access IJ  Left femoral vein on second attempt with successful placement of line      ECG 12 Lead Documentation Only    Date/Time: 10/9/2021 3:26 PM  Performed by: Barb Davidson DO  Authorized by: Barb Davidson DO     Indications / Diagnosis:  Septic workup  ECG reviewed by me, the ED Provider: yes    Patient location:  ED  Previous ECG:     Previous ECG:  Compared to current    Similarity:  Changes noted (PVC's now present)    Comparison to cardiac monitor: Yes    Interpretation:     Interpretation: non-specific    Rate:     ECG rate:  114    ECG rate assessment: tachycardic    Rhythm:     Rhythm: sinus tachycardia    Ectopy:     Ectopy: PVCs    QRS:     QRS axis:  Normal    QRS intervals:  Normal  Conduction:     Conduction: normal    ST segments:     ST segments:  Normal  T waves:     T waves: inverted      Inverted:  III and V1          ED Course  ED Course as of Oct 09 1842   Sat Oct 09, 2021   1521 Temperature(!): 102 4 °F (39 1 °C)   1543 LACTIC ACID: 1 7   1543 Troponin I(!): 0 09   1807 Procalcitonin(!): 21 02   1838 Previously 8 2   Hemoglobin(!): 9 3               Identification of Seniors at Risk      Most Recent Value   (ISAR) Identification of Seniors at Risk   Before the illness or injury that brought you to the Emergency, did you need someone to help you on a regular basis? 0 Filed at: 10/09/2021 1426   In the last 24 hours, have you needed more help than usual?  1 Filed at: 10/09/2021 1426   Have you been hospitalized for one or more nights during the past 6 months? 1 Filed at: 10/09/2021 1426   In general, do you see well?  0 Filed at: 10/09/2021 1426   In general, do you have serious problems with your memory? 0 Filed at: 10/09/2021 1426   Do you take more than three different medications every day? 1 Filed at: 10/09/2021 1426   ISAR Score  3 Filed at: 10/09/2021 1426                Initial Sepsis Screening     Row Name 10/09/21 1810                Is the patient's history suggestive of a new or worsening infection? (!) Yes (Proceed)  -ED        Suspected source of infection  suspect infection, source unknown  -ED        Are two or more of the following signs & symptoms of infection both present and new to the patient? (!) Yes (Proceed)  -ED        Indicate SIRS criteria  Hyperthemia > 38 3C (100 9F); Tachycardia > 90 bpm  -ED        If the answer is yes to both questions, suspicion of sepsis is present  --        If severe sepsis is present AND tissue hypoperfusion perists in the hour after fluid resuscitation or lactate > 4, the patient meets criteria for SEPTIC SHOCK  --        Are any of the following organ dysfunction criteria present within 6 hours of suspected infection and SIRS criteria that are NOT considered to be chronic conditions? No  -ED        Organ dysfunction  --        Date of presentation of severe sepsis  --        Time of presentation of severe sepsis  --        Tissue hypoperfusion persists in the hour after crystalloid fluid administration, evidenced, by either:  --        Was hypotension present within one hour of the conclusion of crystalloid fluid administration?  --        Date of presentation of septic shock  --        Time of presentation of septic shock  --          User Key  (r) = Recorded By, (t) = Taken By, (c) = Cosigned By    [address] Name Provider Type    ED Avelino Obando DO Resident                    MDM  Number of Diagnoses or Management Options  Renal hematoma: new and requires workup  Sepsis [facility]): new and requires workup  Diagnosis management comments: 77year old female presents with abdominal pain and fever  Patient recently had urostomy tube placed, as well as recent revision of RUE AV fistula  On exam, patient appears ill but in no acute distress  Tachycardic and febrile  Abdomen diffusely tender to palpation, especially in the LLQ, no rebound or guarding  RUE edematous and tender to palpation, palpable thrill  Patient evaluated with septic workup and CT abdomen/pelvis  Difficulty with obtaining IV access  Nursing had multiple attempts with no success  US guided peripheral attempted without success  Decision was made to insert central line for access given difficulty in obtaining peripheral access  Patient agreeable  Attempt made in right IJ, but IJ was easily collapsable and patient had small hematoma formation during attempt  Central line then placed in L femoral vein per procedure note   Patient given IV fluids  Patient's labs revealed elevated troponin at 0 09  EKG negative for ischemic changes, troponin bump assumed to be secondary to tachycardia and metabolic stress  Patient also found to have a procalcitonin of 21  Patient's CT scan revealed left renal hematoma  With fever and elevated procalcitonin, concern for overlying infection  Patient started on broad spectrum antibiotics  Dr Annabel Smith spoke with urology and IR  Patient admitted to medicine service for further evaluation and treatment  Amount and/or Complexity of Data Reviewed  Clinical lab tests: ordered and reviewed  Tests in the radiology section of CPT®: ordered and reviewed  Discuss the patient with other providers: yes    Patient Progress  Patient progress: stable      Disposition  Final diagnoses:   Renal hematoma   Sepsis ([facility] [address] )     Time reflects when diagnosis was documented in both MDM as applicable and the Disposition within this note     Time User Action Codes Description Comment    10/9/2021  6:03 PM Elizabeth SELBY Add [S37 [id number]] Renal hematoma     10/9/2021  6:34 PM Beejanene Hernandez Add [A41 9] Sepsis ([facility] [address] )     10/9/2021  6:34 PM Sean Swift [[id number]] Renal hematoma     10/9/2021  6:34 PM Bee Simterence Modify [A41 9] Sepsis [facility])       ED Disposition     ED Disposition Condition Date/Time Comment    Admit Stable Sat Oct 9, 2021  6:35 PM Case was discussed with GERRI and the patient's admission status was agreed to be Admission Status: inpatient status to the service of Dr Imani Myles   Follow-up Information    None         Patient's Medications   Discharge Prescriptions    No medications on file     No discharge procedures on file  PDMP Review       Value Time User    PDMP Reviewed  Yes 1/5/2021 12:26 John Chairez MD           ED Provider  Attending physically available and evaluated Sharon Granda I managed the patient along with the ED Attending      Electronically Signed by         Bhavna Archer DO  10/21/21 1732

## 2021-10-09 NOTE — ASSESSMENT & PLAN NOTE
Chronic saddle pulmonary embolus  Eliquis 2 5 mg bid at home, currently on hold for now if IR wants to do intervention

## 2021-10-09 NOTE — PROGRESS NOTES
Vancomycin Assessment    Andrea Kong is a 77 y o  female who is currently receiving vancomycin 1000mg IV Q12H for urinary tract infection, bacteremia  Relevant clinical data and objective history reviewed:  Creatinine   Date Value Ref Range Status   10/09/2021 3 26 (H) 0 60 - 1 30 mg/dL Final     Comment:     Standardized to IDMS reference method   10/04/2021 3 23 (H) 0 60 - 1 30 mg/dL Final     Comment:     Standardized to IDMS reference method   10/03/2021 3 10 (H) 0 60 - 1 30 mg/dL Final     Comment:     Standardized to IDMS reference method   12/17/2015 0 95 0 60 - 1 30 mg/dL Final     Comment:     Standardized to IDMS reference method   11/13/2015 1 28 0 60 - 1 30 mg/dL Final     Comment:     Standardized to IDMS reference method   11/06/2015 1 05 0 60 - 1 30 mg/dL Final     Comment:     Standardized to IDMS reference method     /60   Pulse 100   Temp (!) 102 4 °F (39 1 °C) (Rectal)   Resp (!) 24   SpO2 98%   No intake/output data recorded  Lab Results   Component Value Date/Time    BUN 37 (H) 10/09/2021 02:47 PM    BUN 8 12/17/2015 10:23 AM    WBC 6 15 10/09/2021 02:47 PM    WBC 20 09 (H) 11/04/2015 04:47 AM    HGB 9 3 (L) 10/09/2021 02:47 PM    HGB 12 2 11/04/2015 04:47 AM    HCT 29 8 (L) 10/09/2021 02:47 PM    HCT 40 1 11/04/2015 04:47 AM    MCV 93 10/09/2021 02:47 PM    MCV 77 (L) 11/04/2015 04:47 AM     10/09/2021 02:47 PM     11/04/2015 04:47 AM     Temp Readings from Last 3 Encounters:   10/09/21 (!) 102 4 °F (39 1 °C) (Rectal)   10/05/21 98 4 °F (36 9 °C) (Oral)   09/29/21 (!) 97 °F (36 1 °C) (Temporal)     Vancomycin Days of Therapy: 1    Assessment/Plan  The patient is currently on vancomycin utilizing pulse dosing based on adjusted body weight (due to obesity)  Baseline risks associated with therapy include: pre-existing renal impairment and advanced age    The patient is currently receiving 1000mg IV Q12H and after clinical evaluation will be changed to 1000mg IV daily PRN for random level <20  Pharmacy will also follow closely for s/sx of nephrotoxicity, infusion reactions, and appropriateness of therapy  BMP and CBC will be ordered per protocol  A random level will be drawn on 10/10 with AM labs  Due to infection severity, will target a trough of 15-20 (appropriate for most indications)   Pharmacy will continue to follow the patients culture results and clinical progress daily      [facility]  Staff Pharmacist

## 2021-10-09 NOTE — ASSESSMENT & PLAN NOTE
Obstructive nephropathy  Chronic bilateral hydronephrosis stents were recently removed  IR consulted

## 2021-10-09 NOTE — ASSESSMENT & PLAN NOTE
Polycythemia vera  Patient has JAKAFI  Significant anemia secondary to blood loss in the past   Holding Jakafi  Consider heme/onc consult if hg drops

## 2021-10-09 NOTE — H&P
[address] [facility] [birth year], 77 y o  female MRN: [id number]  Unit/Bed#: [facility] 929-01 Encounter: [id number]  Primary Care Provider: Moustapha Woo MD   Date and time admitted to hospital: 10/9/2021  2:19 PM    * Renal hematoma, left  Assessment & Plan    Left renal hematoma:   Presented with left renal hematoma  Pigtail catheter is noted medial to kidney  Renal pelvis may be collapsed  Hemorrhage and thickening extending in the combined interfascial place of retrospective as well as some high density fluid within  Urology and IR are aware, ED informed them  Observation and treat the infection        Sepsis [facility])  Assessment & Plan  Sepsis:   Presented with temp 102 4, tachycardia, tachypnea and elevated pro calcitonin possibly suggesting urinary source of infection    Urine has large blood, leukocytes and neg for nitrates  Blood cultures and urine cultures  Fluids and antibiotics    Acute renal failure superimposed on stage 5 chronic kidney disease, not on chronic dialysis [facility])  Assessment & Plan  Lab Results   Component Value Date    EGFR 13 10/09/2021    EGFR 13 10/04/2021    EGFR 14 10/03/2021    CREATININE 3 26 (H) 10/09/2021    CREATININE 3 23 (H) 10/04/2021    CREATININE 3 10 (H) 10/03/2021   Stage 5 chronic kidney disease not on chronic dialysis  Patient has progressive CKD probably secondary to obstructive nephropathy  Avoid nephrotoxic medication  Daily BMP  Nephrology consult    Polycythemia vera ([address] )  Assessment & Plan  Polycythemia vera  Patient has JAKAFI  Significant anemia secondary to blood loss in the past   Holding Jakafi  Consider heme/onc consult if hg drops    Hypertension  Assessment & Plan  Continue with metoprolol 25 mg bid    Obstructive uropathy  Assessment & Plan  Obstructive nephropathy  Chronic bilateral hydronephrosis stents were recently removed  IR consulted    Chronic saddle pulmonary embolism (HCC)  Assessment & Plan    Chronic saddle pulmonary embolus  Eliquis 2 5 mg bid at home, currently on hold for now if IR wants to do intervention  VTE Pharmacologic Prophylaxis:   High Risk (Score >/= 5) - Pharmacological DVT Prophylaxis Ordered: heparin  Sequential Compression Devices Ordered  Code Status: Prior    Discussion with family:    Anticipated Length of Stay: Patient will be admitted on an inpatient basis with an anticipated length of stay of greater than 2 midnights secondary to renal hematoma  Total Time for Visit, including Counseling / Coordination of Care: 45 minutes Greater than 50% of this total time spent on direct patient counseling and coordination of care  Chief Complaint: abdominal pain     History of Present Illness:  Jann Garcia is a 77 y o  female with a PMH of Hx of obstructive uropathy,  Hydronephrosis with stent placement, now s/p stent removal, polycythemia vera, HTN, who presents with abdominal pain  Work up showed hematoma of the left kidney with sepsis  IR and urology contacted by ED  They have recommended to treat infection and observation  She was started on ceftriaxone and vancomycin ivss  She will be monitored closely  Review of Systems:  Review of Systems   Constitutional: Positive for activity change, fatigue and fever  Negative for appetite change, chills, diaphoresis and unexpected weight change  HENT: Negative for ear pain and sore throat  Eyes: Negative  Negative for pain and visual disturbance  Respiratory: Positive for cough and shortness of breath  Cardiovascular: Negative for chest pain, palpitations and leg swelling  Gastrointestinal: Negative for abdominal pain and vomiting  Genitourinary: Negative for dysuria and hematuria  Musculoskeletal: Negative for arthralgias and back pain  Skin: Negative for color change and rash  Neurological: Negative for seizures and syncope  All other systems reviewed and are negative        Past Medical and Surgical History:   Past Medical History:   Diagnosis Date    Anxiety     Bowel obstruction (HCC)     Chronic kidney disease     Chronic kidney disease (CKD), stage IV (severe) (HCC)     stage IV    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    Circulation problem     Compression fracture of cervical spine ([facility] [address] )     COVID-19 07/2021    hospitalized     Hernia of abdominal cavity     History of kidney problems     History of transfusion     Hydronephrosis     Hypertension     IBS (irritable bowel syndrome)     Incontinence     Lung mass     Improving on PET/CT 1/2016    Polycythemia vera ([facility] )     Pulmonary embolism ([facility] [address] ) 2014    Shingles     Urinary tract infection        Past Surgical History:   Procedure Laterality Date    ABDOMINAL ADHESION SURGERY N/A 8/9/2020    Procedure: LYSIS ADHESIONS;  Surgeon: John Mckinney DO;  [facility];  Service: General    BLADDER SUSPENSION      BOTOX INJECTION N/A 7/27/2016    Procedure: BOTOX INJECTION ;  Surgeon: Lexus Ny MD;  [facility];  Service:     CHOLECYSTECTOMY N/A     COLONOSCOPY      CYSTECTOMY, RADICAL WITH ILEOCONDUIT N/A 10/4/2016    Procedure: Jeannette Christos ;  Surgeon: Lexus Ny MD;  [facility];  Service:    [place] W/ RETROGRADES Bilateral 7/27/2016    Procedure: CYSTOSCOPY; RETROGRADE PYELOGRAM ;  Surgeon: Lexus Ny MD;  [facility];  Service:     HERNIA REPAIR      IR AV FISTULAGRAM/GRAFTOGRAM  2/10/2021    IR NEPHROSTOMY TO NEPHROURETERAL STENT  5/15/2021    IR NEPHROSTOMY TO NEPHROURETERAL STENT  6/9/2021    IR NEPHROSTOMY TUBE CHECK AND/OR REMOVAL  6/16/2021    IR NEPHROSTOMY TUBE CHECK/CHANGE/REPOSITION/REINSERTION/UPSIZE  5/14/2021    IR NEPHROSTOMY TUBE CHECK/CHANGE/REPOSITION/REINSERTION/UPSIZE  5/21/2021    IR NEPHROSTOMY TUBE CHECK/CHANGE/REPOSITION/REINSERTION/UPSIZE  9/16/2021    IR NEPHROSTOMY TUBE CHECK/CHANGE/REPOSITION/REINSERTION/UPSIZE  10/4/2021    IR NEPHROSTOMY TUBE PLACEMENT  5/10/2021    IR NEPHROSTOMY TUBE PLACEMENT  9/20/2021    IR NON-TUNNELED CENTRAL LINE PLACEMENT  7/17/2020    IR NON-TUNNELED CENTRAL LINE PLACEMENT  8/14/2020    IR NON-TUNNELED CENTRAL LINE PLACEMENT  7/16/2021    LAPAROTOMY N/A 8/9/2020    Procedure: LAPAROTOMY EXPLORATORY, PARASTOMAL HERNIA REPAIR WITH MESH;  Surgeon: Shelly Levy DO;  [facility];  Service: General    GA ANASTOMOSIS,AV,ANY SITE Right 1/5/2021    Procedure: Creation of right brachiobasilic fistula; Surgeon: Cedric Chairez MD;  [facility];  Service: Vascular    GA COLONOSCOPY FLX DX W/COLLJ SPEC WHEN PFRMD N/A 8/31/2016    Procedure: COLONOSCOPY;  Surgeon: Dio Latham MD;  [facility]; Service: Gastroenterology    GA CYSTOSCOPY,INSERT URETERAL STENT Bilateral 7/27/2016    Procedure: STENT INSERTION; EXCISION OF MESH ;  Surgeon: Alex Greene MD;  [facility];  Service: Urology    GA REVISE AV FISTULA,W/O THROMBECTOMY Right 9/30/2021    Procedure: Superficialization and transposition of right brachiobasilic fistula; Surgeon: Kevyn Chairez MD;  [facility];  Service: Vascular    TONSILLECTOMY      TUBAL LIGATION      WISDOM TOOTH EXTRACTION         Meds/Allergies:  Prior to Admission medications    Medication Sig Start Date End Date Taking?  Authorizing Provider   acetaminophen (TYLENOL) 325 mg tablet 650 mg every 6 hours as needed for mild pain or headache 1/14/19   Edith Calderon PA-C   apixaban (Eliquis) 2 5 mg Take 1 tablet (2 5 mg total) by mouth 2 (two) times a day  Patient taking differently: Take 2 5 mg by mouth daily  8/19/21   VICTORINA Peña   atorvastatin (LIPITOR) 40 mg tablet Take 1 tablet (40 mg total) by mouth daily at bedtime 8/19/21   VICTORINA Peña   calcitriol (ROCALTROL) 0 25 mcg capsule Take 1 capsule (0 25 mcg total) by mouth daily 8/19/21   Yoko Whalen VICTORINA Camilo   Cholecalciferol (VITAMIN D3) 1000 UNITS CAPS Take by mouth daily 1000 IU    Historical Provider, MD   citalopram (CeleXA) 20 mg tablet Take 1 tablet (20 mg total) by mouth daily 8/19/21   VICTORINA Peña   levothyroxine 75 mcg tablet Take 1 tablet (75 mcg total) by mouth daily 8/19/21   VICTORINA Peña   loperamide (IMODIUM) 2 mg capsule Take 1 capsule (2 mg total) by mouth 4 (four) times a day as needed for diarrhea 7/14/20   Aranza Munoz MD   melatonin 3 mg Take 1 tablet (3 mg total) by mouth daily at bedtime 7/19/20   Zachariah Katz DO   metoprolol tartrate (LOPRESSOR) 25 mg tablet Take 1 tablet (25 mg total) by mouth 2 (two) times a day 8/19/21   VICTORINA Peña   saccharomyces boulardii (Florastor) 250 mg capsule Take 1 capsule (250 mg total) by mouth daily  Patient taking differently: Take 250 mg by mouth daily as needed  8/19/21   VICTORINA Peña   sodium bicarbonate 650 mg tablet Take 1 tablet (650 mg total) by mouth 2 (two) times daily after meals 8/19/21   VICTORINA Peña     I have reviewed home medications with patient personally  Allergies: Allergies   Allergen Reactions    Chlorhexidine Rash     petichi like rash when using chlorhexidine swabs prior to IV  Social History:  Marital Status:     Occupation:    Patient Pre-hospital Living Situation: Home  Patient Pre-hospital Level of Mobility:    Patient Pre-hospital Diet Restrictions: patient not able to provide  Substance Use History:   Social History     Substance and Sexual Activity   Alcohol Use Not Currently    Comment: n/s     Social History     Tobacco Use   Smoking Status Never Smoker   Smokeless Tobacco Never Used   Tobacco Comment    n/a     Social History     Substance and Sexual Activity   Drug Use Not Currently    Comment: n/a       Family History:  Family History   Problem Relation Age of Onset    Cancer Mother         small cell cancer     Heart disease Father     COPD Father     Heart disease Brother     Nephrolithiasis Brother        Physical Exam:     Vitals:   Blood Pressure: 135/60 (10/09/21 1700)  Pulse: 100 (10/09/21 1700)  Temperature: (!) 102 4 °F (39 1 °C) (10/09/21 1446)  Temp Source: Rectal (10/09/21 1446)  Respirations: (!) 24 (10/09/21 1700)  SpO2: 98 % (10/09/21 1700)    Physical Exam  Vitals and nursing note reviewed  Constitutional:       General: She is not in acute distress  Appearance: She is well-developed  HENT:      Head: Normocephalic and atraumatic  Eyes:      Conjunctiva/sclera: Conjunctivae normal    Cardiovascular:      Rate and Rhythm: Normal rate and regular rhythm  Heart sounds: Murmur heard  Pulmonary:      Effort: Pulmonary effort is normal  No respiratory distress  Breath sounds: Normal breath sounds  Abdominal:      Palpations: Abdomen is soft  Tenderness: There is no abdominal tenderness  Musculoskeletal:         General: Swelling present  Cervical back: Neck supple  Right lower leg: Edema present  Left lower leg: Edema present  Comments: Upper ext edema   Skin:     General: Skin is warm and dry  Neurological:      Mental Status: She is alert            Additional Data:     Lab Results:  Results from last 7 days   Lab Units 10/09/21  1447   WBC Thousand/uL 6 15   HEMOGLOBIN g/dL 9 3*   HEMATOCRIT % 29 8*   PLATELETS Thousands/uL 276   BANDS PCT % 8   LYMPHO PCT % 1*   MONO PCT % 5   EOS PCT % 0     Results from last 7 days   Lab Units 10/09/21  1447   SODIUM mmol/L 138   POTASSIUM mmol/L 3 2*   CHLORIDE mmol/L 110*   CO2 mmol/L 16*   BUN mg/dL 37*   CREATININE mg/dL 3 26*   ANION GAP mmol/L 12   CALCIUM mg/dL 9 3   ALBUMIN g/dL 2 1*   TOTAL BILIRUBIN mg/dL 1 26*   ALK PHOS U/L 128*   ALT U/L 11*   AST U/L 63*   GLUCOSE RANDOM mg/dL 100     Results from last 7 days   Lab Units 10/09/21  1447   INR  1 63*             Results from last 7 days   Lab Units 10/09/21  1447   LACTIC ACID mmol/L 1 7 PROCALCITONIN ng/ml 21 02*       Imaging: Reviewed radiology reports from this admission including: abdominal/pelvic CT  CT abdomen pelvis wo contrast   Final Result by Oleg Lebron MD (10/09 1625)      In the left renal fossa, there is a collection of mixed intermediate and high attenuation suggesting a hematoma with some bubbles of gas  Pigtail catheter is noted medial to the kidney  The renal pelvis may be collapsed  There appears to be some    hemorrhage and thickening extending in the combined interfascial plane of the retroperitoneum as well as some high density fluid within the pelvis suggesting hemoperitoneum  Superinfection of the kidney is possible  Urology evaluation is advised  Nonobstructing calculi in the right kidney  Some prominent loops of bowel are more likely large bowel suggesting ileus  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

## 2021-10-09 NOTE — ASSESSMENT & PLAN NOTE
Lab Results   Component Value Date    EGFR 13 10/09/2021    EGFR 13 10/04/2021    EGFR 14 10/03/2021    CREATININE 3 26 (H) 10/09/2021    CREATININE 3 23 (H) 10/04/2021    CREATININE 3 10 (H) 10/03/2021   Stage 5 chronic kidney disease not on chronic dialysis  Patient has progressive CKD probably secondary to obstructive nephropathy  Avoid nephrotoxic medication  Daily BMP  Nephrology consult

## 2021-10-09 NOTE — PROGRESS NOTES
Patient did not bring a list with her  She stated they are all in the computer and I have had no meds today

## 2021-10-09 NOTE — ASSESSMENT & PLAN NOTE
Sepsis:   Presented with temp 102 4, tachycardia, tachypnea and elevated pro calcitonin possibly suggesting urinary source of infection    Urine has large blood, leukocytes and neg for nitrates  Blood cultures and urine cultures  Fluids and antibiotics

## 2021-10-09 NOTE — ED ATTENDING ATTESTATION
10/9/2021  IMelanie DO, saw and evaluated the patient  I have discussed the patient with the resident/non-physician practitioner and agree with the resident's/non-physician practitioner's findings, Plan of Care, and MDM as documented in the resident's/non-physician practitioner's note, except where noted  All available labs and Radiology studies were reviewed  I was present for key portions of any procedure(s) performed by the resident/non-physician practitioner and I was immediately available to provide assistance  At this point I agree with the current assessment done in the Emergency Department  I have conducted an independent evaluation of this patient a history and physical is as follows:    81-year-old female presents with abdominal pain, fever  Patient recently admitted and had nephrostomy tube and surgery on her fistula  Patient has not started dialysis yet  Patient states fever started today  Complains of abdominal and worse on the left side  Has also had diarrhea and vomiting  Exam-no acute distress the patient appears ill, heart tachycardic, no respiratory distress, abdomen soft with tenderness diffusely, worse in the left lower quadrant, a urostomy tube in place, nephrostomy tube in place, right upper extremity edematous with sutures that are clean dry intact without erythema, palpable thrill  No lower extremity edema  Plan - septic workup, CT abdomen    ED Course     I spoke with radiologist regarding CT - renal hematoma with some retroperitoneal blood  D/W urology who advised yes renal hematoma, monitor H and H and Cr, start abx  I spoke with IR who agree renal hematoma, also advise monitor H and H and Cr  Start broad spectrum abx and monitor  Central line placed, abx already ordered and IVF    Plan for admit  Will also consult vascular regarding recent fistula surgery    Critical Care Time  CriticalCare Time  Performed by: Melanie Dominguez DO  Authorized by: Moe Higgins BAL Ann DO     Critical care provider statement:     Critical care time (minutes):  50    Critical care time was exclusive of:  Separately billable procedures and treating other patients and teaching time    Critical care was necessary to treat or prevent imminent or life-threatening deterioration of the following conditions:  Sepsis    Critical care was time spent personally by me on the following activities:  Obtaining history from patient or surrogate, discussions with consultants, evaluation of patient's response to treatment, examination of patient, review of old charts, re-evaluation of patient's condition, ordering and review of radiographic studies and ordering and review of laboratory studies    I assumed direction of critical care for this patient from another provider in my specialty: no

## 2021-10-09 NOTE — ASSESSMENT & PLAN NOTE
Left renal hematoma:   Presented with left renal hematoma  Pigtail catheter is noted medial to kidney  Renal pelvis may be collapsed  Hemorrhage and thickening extending in the combined interfascial place of retrospective as well as some high density fluid within  Urology and IR are aware, ED informed them     Observation and treat the infection

## 2021-10-09 NOTE — ED NOTES
Provider at bedside  Residents attempting IV access at this time        Akanksha Alves RN  10/09/21 [id number]

## 2021-10-10 ENCOUNTER — APPOINTMENT (INPATIENT)
Dept: NON INVASIVE DIAGNOSTICS | Facility: HOSPITAL | Age: 76
DRG: 981 | End: 2021-10-10
Payer: COMMERCIAL

## 2021-10-10 PROBLEM — N18.5 ANEMIA DUE TO STAGE 5 CHRONIC KIDNEY DISEASE, NOT ON CHRONIC DIALYSIS (HCC): Status: ACTIVE | Noted: 2017-07-07

## 2021-10-10 PROBLEM — R22.31 LOCALIZED SWELLING OF RIGHT UPPER EXTREMITY: Status: ACTIVE | Noted: 2021-10-10

## 2021-10-10 PROBLEM — J90 PLEURAL EFFUSION: Status: ACTIVE | Noted: 2021-10-10

## 2021-10-10 LAB
ALBUMIN SERPL BCP-MCNC: 3 G/DL (ref 3.5–5)
ALP SERPL-CCNC: 81 U/L (ref 46–116)
ALT SERPL W P-5'-P-CCNC: 15 U/L (ref 12–78)
ANION GAP SERPL CALCULATED.3IONS-SCNC: 11 MMOL/L (ref 4–13)
AST SERPL W P-5'-P-CCNC: 72 U/L (ref 5–45)
BACTERIA UR QL AUTO: ABNORMAL /HPF
BILIRUB SERPL-MCNC: 1.6 MG/DL (ref 0.2–1)
BILIRUB UR QL STRIP: NEGATIVE
BUN SERPL-MCNC: 44 MG/DL (ref 5–25)
CALCIUM ALBUM COR SERPL-MCNC: 9.3 MG/DL (ref 8.3–10.1)
CALCIUM SERPL-MCNC: 8.5 MG/DL (ref 8.3–10.1)
CHLORIDE SERPL-SCNC: 109 MMOL/L (ref 100–108)
CLARITY UR: ABNORMAL
CO2 SERPL-SCNC: 18 MMOL/L (ref 21–32)
COLOR UR: ABNORMAL
CREAT SERPL-MCNC: 3.6 MG/DL (ref 0.6–1.3)
ERYTHROCYTE [DISTWIDTH] IN BLOOD BY AUTOMATED COUNT: 16.7 % (ref 11.6–15.1)
FERRITIN SERPL-MCNC: 1650 NG/ML (ref 8–388)
FERRITIN SERPL-MCNC: 1668 NG/ML (ref 8–388)
FOLATE SERPL-MCNC: 11.5 NG/ML (ref 3.1–17.5)
GFR SERPL CREATININE-BSD FRML MDRD: 12 ML/MIN/1.73SQ M
GLUCOSE SERPL-MCNC: 107 MG/DL (ref 65–140)
GLUCOSE UR STRIP-MCNC: NEGATIVE MG/DL
HCT VFR BLD AUTO: 23 % (ref 34.8–46.1)
HCT VFR BLD AUTO: 23.2 % (ref 34.8–46.1)
HGB BLD-MCNC: 7.1 G/DL (ref 11.5–15.4)
HGB BLD-MCNC: 7.2 G/DL (ref 11.5–15.4)
HGB UR QL STRIP.AUTO: ABNORMAL
IRON SATN MFR SERPL: 10 % (ref 15–50)
IRON SERPL-MCNC: 10 UG/DL (ref 50–170)
KETONES UR STRIP-MCNC: NEGATIVE MG/DL
LEUKOCYTE ESTERASE UR QL STRIP: ABNORMAL
MAGNESIUM SERPL-MCNC: 1.7 MG/DL (ref 1.6–2.6)
MCH RBC QN AUTO: 28.4 PG (ref 26.8–34.3)
MCHC RBC AUTO-ENTMCNC: 30.6 G/DL (ref 31.4–37.4)
MCV RBC AUTO: 93 FL (ref 82–98)
NITRITE UR QL STRIP: NEGATIVE
NON-SQ EPI CELLS URNS QL MICRO: ABNORMAL /HPF
NRBC BLD AUTO-RTO: 0 /100 WBCS
PH UR STRIP.AUTO: 8.5 [PH]
PHOSPHATE SERPL-MCNC: 4.9 MG/DL (ref 2.3–4.1)
PLATELET # BLD AUTO: 187 THOUSANDS/UL (ref 149–390)
PMV BLD AUTO: 10.4 FL (ref 8.9–12.7)
POTASSIUM SERPL-SCNC: 3.8 MMOL/L (ref 3.5–5.3)
PROCALCITONIN SERPL-MCNC: 78.78 NG/ML
PROT SERPL-MCNC: 6.3 G/DL (ref 6.4–8.2)
PROT UR STRIP-MCNC: ABNORMAL MG/DL
RBC # BLD AUTO: 2.5 MILLION/UL (ref 3.81–5.12)
RBC #/AREA URNS AUTO: ABNORMAL /HPF
SODIUM SERPL-SCNC: 138 MMOL/L (ref 136–145)
SP GR UR STRIP.AUTO: 1.02 (ref 1–1.03)
TIBC SERPL-MCNC: 104 UG/DL (ref 250–450)
UROBILINOGEN UR QL STRIP.AUTO: 0.2 E.U./DL
VANCOMYCIN SERPL-MCNC: 19.1 UG/ML
VIT B12 SERPL-MCNC: 390 PG/ML (ref 100–900)
WBC # BLD AUTO: 4.97 THOUSAND/UL (ref 4.31–10.16)
WBC #/AREA URNS AUTO: ABNORMAL /HPF

## 2021-10-10 PROCEDURE — 82728 ASSAY OF FERRITIN: CPT | Performed by: STUDENT IN AN ORGANIZED HEALTH CARE EDUCATION/TRAINING PROGRAM

## 2021-10-10 PROCEDURE — 83550 IRON BINDING TEST: CPT | Performed by: STUDENT IN AN ORGANIZED HEALTH CARE EDUCATION/TRAINING PROGRAM

## 2021-10-10 PROCEDURE — 99223 1ST HOSP IP/OBS HIGH 75: CPT | Performed by: INTERNAL MEDICINE

## 2021-10-10 PROCEDURE — 81001 URINALYSIS AUTO W/SCOPE: CPT | Performed by: INTERNAL MEDICINE

## 2021-10-10 PROCEDURE — 84100 ASSAY OF PHOSPHORUS: CPT | Performed by: INTERNAL MEDICINE

## 2021-10-10 PROCEDURE — 80053 COMPREHEN METABOLIC PANEL: CPT | Performed by: INTERNAL MEDICINE

## 2021-10-10 PROCEDURE — 85027 COMPLETE CBC AUTOMATED: CPT | Performed by: INTERNAL MEDICINE

## 2021-10-10 PROCEDURE — 99024 POSTOP FOLLOW-UP VISIT: CPT | Performed by: SURGERY

## 2021-10-10 PROCEDURE — 80202 ASSAY OF VANCOMYCIN: CPT | Performed by: INTERNAL MEDICINE

## 2021-10-10 PROCEDURE — 87186 SC STD MICRODIL/AGAR DIL: CPT | Performed by: INTERNAL MEDICINE

## 2021-10-10 PROCEDURE — 93990 DOPPLER FLOW TESTING: CPT

## 2021-10-10 PROCEDURE — 87077 CULTURE AEROBIC IDENTIFY: CPT | Performed by: INTERNAL MEDICINE

## 2021-10-10 PROCEDURE — 82607 VITAMIN B-12: CPT | Performed by: STUDENT IN AN ORGANIZED HEALTH CARE EDUCATION/TRAINING PROGRAM

## 2021-10-10 PROCEDURE — 82746 ASSAY OF FOLIC ACID SERUM: CPT | Performed by: STUDENT IN AN ORGANIZED HEALTH CARE EDUCATION/TRAINING PROGRAM

## 2021-10-10 PROCEDURE — 99232 SBSQ HOSP IP/OBS MODERATE 35: CPT | Performed by: STUDENT IN AN ORGANIZED HEALTH CARE EDUCATION/TRAINING PROGRAM

## 2021-10-10 PROCEDURE — 85018 HEMOGLOBIN: CPT | Performed by: STUDENT IN AN ORGANIZED HEALTH CARE EDUCATION/TRAINING PROGRAM

## 2021-10-10 PROCEDURE — 99222 1ST HOSP IP/OBS MODERATE 55: CPT | Performed by: UROLOGY

## 2021-10-10 PROCEDURE — 84145 PROCALCITONIN (PCT): CPT | Performed by: INTERNAL MEDICINE

## 2021-10-10 PROCEDURE — 87086 URINE CULTURE/COLONY COUNT: CPT | Performed by: INTERNAL MEDICINE

## 2021-10-10 PROCEDURE — 83735 ASSAY OF MAGNESIUM: CPT | Performed by: INTERNAL MEDICINE

## 2021-10-10 PROCEDURE — 83540 ASSAY OF IRON: CPT | Performed by: STUDENT IN AN ORGANIZED HEALTH CARE EDUCATION/TRAINING PROGRAM

## 2021-10-10 PROCEDURE — 85014 HEMATOCRIT: CPT | Performed by: STUDENT IN AN ORGANIZED HEALTH CARE EDUCATION/TRAINING PROGRAM

## 2021-10-10 RX ADMIN — ACETAMINOPHEN 975 MG: 325 TABLET, FILM COATED ORAL at 06:18

## 2021-10-10 RX ADMIN — CITALOPRAM HYDROBROMIDE 20 MG: 20 TABLET ORAL at 08:37

## 2021-10-10 RX ADMIN — ACETAMINOPHEN 975 MG: 325 TABLET, FILM COATED ORAL at 14:29

## 2021-10-10 RX ADMIN — CALCITRIOL 0.25 MCG: 0.25 CAPSULE, LIQUID FILLED ORAL at 08:37

## 2021-10-10 RX ADMIN — DEXTROSE 1000 MG: 50 INJECTION, SOLUTION INTRAVENOUS at 19:09

## 2021-10-10 RX ADMIN — LEVOTHYROXINE SODIUM 75 MCG: 75 TABLET ORAL at 08:37

## 2021-10-10 RX ADMIN — ALBUMIN (HUMAN) 25 G: 0.25 INJECTION, SOLUTION INTRAVENOUS at 04:01

## 2021-10-10 RX ADMIN — SODIUM BICARBONATE 650 MG TABLET 650 MG: at 17:16

## 2021-10-10 RX ADMIN — SODIUM BICARBONATE 650 MG TABLET 650 MG: at 08:37

## 2021-10-10 RX ADMIN — ACETAMINOPHEN 975 MG: 325 TABLET, FILM COATED ORAL at 22:33

## 2021-10-10 RX ADMIN — Medication 250 MG: at 08:37

## 2021-10-10 RX ADMIN — MELATONIN TAB 3 MG 3 MG: 3 TAB at 22:33

## 2021-10-10 RX ADMIN — ALBUMIN (HUMAN) 25 G: 0.25 INJECTION, SOLUTION INTRAVENOUS at 01:36

## 2021-10-10 RX ADMIN — ATORVASTATIN CALCIUM 40 MG: 40 TABLET, FILM COATED ORAL at 22:33

## 2021-10-10 NOTE — CONSULTS
Consultation - Nephrology   Nadja Ram 77 y o  female MRN: [id number]  [facility] Encounter: [id number]    ASSESSMENT AND PLAN:  77 y  o   female with pmh of hypertension, secondary hyperparathyroidism, anemia, history of bilateral hydronephrosis secondary to obstructive uropathy and nonfunctional bladder status post supratrigonal cystectomy and ileal conduit in October 2016, status post nephrostomy tube placement 05/11/2021 and status postleft percutaneous/retrograde percutaneous nephrostomy placement on 09/20/2021, obesity and CKD stage 5 who was admitted for elective right brachial basilic fistula superficialization with transposition on 09/30/2021  Nephrology has been consulted for evaluation and management of CKD stage 5  CKD stage 5, baseline creatinine 3 to 3 5, follows with Dr Garrett Russo  -creatinine somewhat fluctuating but relatively stable with most recent creatinine 3 6 still closer to baseline   -no emergent indication for dialysis yet  -CKD suspect secondary to obstructive nephropathy, prior episodes of LUANN  Access, currently has right upper extremity AV fistula  initially created in January 2021, status post fistulogram with angioplasty earlier this year   Status post transposition on 9/30/21   Vascular surgery follow-up  CKD anemia, closely monitor hemoglobin, transfuse p r n  For hemoglobin less than seven  Suspect metabolic acidosis with bicarb level lower 18 0   -increase sodium bicarbonate to two tablet p o  T i d  History of cystectomy with ileal conduit in 2016 due to bladder dysfunction 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, 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 with left nephrostomy tube and nephroureteral catheter  On 10/4/21, left nephrostomy tube was removed   -most recent CT scan concerning for hemoperitoneum, perinephric hematoma  Currently being monitor conservatively by Urology      Discussed above plan in detail with primary team     HISTORY OF PRESENT ILLNESS:  Requesting Physician: Sabra Urias DO  Reason for Consult:  CKD    Joe Benjamin is a 77y o  year old female who was admitted to [facility] after presenting with abdominal pain  A renal consultation is requested today for assistance in the management of CKD  Old medical records including prior creatinine values were reviewed from [address] [place] [address] records  Patient denies any chest pain, denies any worsening shortness of breath  She was recently hospitalized and now presents again with abdominal pain  She recently had left nephrostomy tube removed and now found to have perinephric/renal hematoma on CT scan  Currently being followed by Urology      PAST MEDICAL HISTORY:  Past Medical History:   Diagnosis Date    Anxiety     Bowel obstruction ([facility] )     Chronic kidney disease     Chronic kidney disease (CKD), stage IV (severe) (HCC)     stage IV    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    Circulation problem     Compression fracture of cervical spine ([facility] )     COVID-19 07/2021    hospitalized     Hernia of abdominal cavity     History of kidney problems     History of transfusion     Hydronephrosis     Hypertension     IBS (irritable bowel syndrome)     Incontinence     Lung mass     Improving on PET/CT 1/2016    Polycythemia vera ([facility] )     Pulmonary embolism ([facility] [facility] ) 2014    Shingles     Urinary tract infection        PAST SURGICAL HISTORY:  Past Surgical History:   Procedure Laterality Date    ABDOMINAL ADHESION SURGERY N/A 8/9/2020    Procedure: LYSIS ADHESIONS;  Surgeon: Shelly Levy DO;  [facility];  Service: General    BLADDER SUSPENSION      BOTOX INJECTION N/A 7/27/2016    Procedure: BOTOX INJECTION ;  Surgeon: Alex Greene MD;  [facility];  Service:     CHOLECYSTECTOMY N/A     COLONOSCOPY      CYSTECTOMY, RADICAL WITH ILEOCONDUIT N/A 10/4/2016    Procedure: SUPRATRIGONAL CYSTECTOMY WITH ILEAL CONDUIT ;  Surgeon: Chasity Connell MD;  [facility];  Service:    Carlos Eduardo [place] CYSTOSCOPY W/ RETROGRADES Bilateral 7/27/2016    Procedure: CYSTOSCOPY; RETROGRADE PYELOGRAM ;  Surgeon: Chasity Connell MD;  [facility];  Service:     HERNIA REPAIR      IR AV FISTULAGRAM/GRAFTOGRAM  2/10/2021    IR NEPHROSTOMY TO NEPHROURETERAL STENT  5/15/2021    IR NEPHROSTOMY TO NEPHROURETERAL STENT  6/9/2021    IR NEPHROSTOMY TUBE CHECK AND/OR REMOVAL  6/16/2021    IR NEPHROSTOMY TUBE CHECK/CHANGE/REPOSITION/REINSERTION/UPSIZE  5/14/2021    IR NEPHROSTOMY TUBE CHECK/CHANGE/REPOSITION/REINSERTION/UPSIZE  5/21/2021    IR NEPHROSTOMY TUBE CHECK/CHANGE/REPOSITION/REINSERTION/UPSIZE  9/16/2021    IR NEPHROSTOMY TUBE CHECK/CHANGE/REPOSITION/REINSERTION/UPSIZE  10/4/2021    IR NEPHROSTOMY TUBE PLACEMENT  5/10/2021    IR NEPHROSTOMY TUBE PLACEMENT  9/20/2021    IR NON-TUNNELED CENTRAL LINE PLACEMENT  7/17/2020    IR NON-TUNNELED CENTRAL LINE PLACEMENT  8/14/2020    IR NON-TUNNELED CENTRAL LINE PLACEMENT  7/16/2021    LAPAROTOMY N/A 8/9/2020    Procedure: LAPAROTOMY EXPLORATORY, PARASTOMAL HERNIA REPAIR WITH MESH;  Surgeon: Marco Antonio Dominguez DO;  [facility];  Service: General    DC ANASTOMOSIS,AV,ANY SITE Right 1/5/2021    Procedure: Creation of right brachiobasilic fistula; Surgeon: Liset Chairez MD;  [facility];  Service: Vascular    DC COLONOSCOPY FLX DX W/COLLJ SPEC WHEN PFRMD N/A 8/31/2016    Procedure: COLONOSCOPY;  Surgeon: Prabhakar Valenzuela MD;  Location: [facility]; Service: Gastroenterology    DC CYSTOSCOPY,INSERT URETERAL STENT Bilateral 7/27/2016    Procedure: STENT INSERTION; EXCISION OF MESH ;  Surgeon: Chasity Connell MD;  [facility];  Service: Urology    DC REVISE AV FISTULA,W/O THROMBECTOMY Right 9/30/2021    Procedure: Superficialization and transposition of right brachiobasilic fistula;   Surgeon: Sunil Chairez MD;  [facility];  Service: Vascular    TONSILLECTOMY      TUBAL LIGATION      WISDOM TOOTH EXTRACTION         ALLERGIES:  Allergies   Allergen Reactions    Chlorhexidine Rash     petichi like rash when using chlorhexidine swabs prior to IV          SOCIAL HISTORY:  Social History     Substance and Sexual Activity   Alcohol Use Not Currently    Comment: n/s     Social History     Substance and Sexual Activity   Drug Use Not Currently    Comment: n/a     Social History     Tobacco Use   Smoking Status Never Smoker   Smokeless Tobacco Never Used   Tobacco Comment    n/a       FAMILY HISTORY:  Family History   Problem Relation Age of Onset    Cancer Mother         small cell cancer     Heart disease Father     COPD Father     Heart disease Brother     Nephrolithiasis Brother        MEDICATIONS:    Current Facility-Administered Medications:     acetaminophen (TYLENOL) tablet 650 mg, 650 mg, Oral, Q6H PRN, Ny Portillo MD    acetaminophen (TYLENOL) tablet 975 mg, 975 mg, Oral, Q8H [address], Ny Portillo MD, 975 mg at 10/10/21 1429    aluminum-magnesium hydroxide-simethicone (MYLANTA) oral suspension 30 mL, 30 mL, Oral, Q6H PRN, Ny Portillo MD    atorvastatin (LIPITOR) tablet 40 mg, 40 mg, Oral, HS, Ny Portillo MD, 40 mg at 10/09/21 2231    calcitriol (ROCALTROL) capsule 0 25 mcg, 0 25 mcg, Oral, Daily, Ny Portillo MD, 0 25 mcg at 10/10/21 0837    cefTRIAXone (ROCEPHIN) 1,000 mg in dextrose 5 % 50 mL IVPB, 1,000 mg, Intravenous, Q24H, Ny Portillo MD    cholecalciferol (VITAMIN D) oral liquid 800 Units, 800 Units, Oral, Daily, Ny Portillo MD, 800 Units at 10/10/21 0838    citalopram (CeleXA) tablet 20 mg, 20 mg, Oral, Daily, Ny Portillo MD, 20 mg at 10/10/21 0837    docusate sodium (COLACE) capsule 100 mg, 100 mg, Oral, BID, Ny Portillo MD, 100 mg at 10/09/21 2232    fentanyl citrate (PF) 100 MCG/2ML 25 mcg, 25 mcg, Intravenous, Q2H PRN, Ny Portillo MD    furosemide (LASIX) tablet 20 mg, 20 mg, Oral, Daily, John Mejia MD    levothyroxine tablet 75 mcg, 75 mcg, Oral, Daily, John Mejia MD, 75 mcg at 10/10/21 0837    melatonin tablet 3 mg, 3 mg, Oral, HS, John Mejia MD, 3 mg at 10/09/21 2232    metoprolol tartrate (LOPRESSOR) tablet 25 mg, 25 mg, Oral, BID, John Mejia MD, 25 mg at 10/09/21 2232    ondansetron (ZOFRAN) injection 4 mg, 4 mg, Intravenous, Q6H PRN, John Mejia MD    saccharomyces boulardii (FLORASTOR) capsule 250 mg, 250 mg, Oral, Daily, John Mejia MD, 250 mg at 10/10/21 8491    senna (SENOKOT) tablet 8 6 mg, 1 tablet, Oral, Daily, John Mejia MD    sodium bicarbonate tablet 650 mg, 650 mg, Oral, BID after meals, John Mejia MD, 650 mg at 10/10/21 0089    REVIEW OF SYSTEMS:  Complete 10 point review of systems were obtained and discussed in length with the patient  Complete review of systems were negative / unremarkable except mentioned in the HPI section       PHYSICAL EXAM:  Current Weight: Weight - Scale: 86 5 kg (190 lb 11 2 oz)  First Weight: Weight - Scale: 86 5 kg (190 lb 11 2 oz)  Vitals:    10/10/21 1454   BP: 106/61   Pulse: 89   Resp: 17   Temp: 97 9 °F (36 6 °C)   SpO2: 95%       Intake/Output Summary (Last 24 hours) at 10/10/2021 1543  Last data filed at 10/10/2021 1358  Gross per 24 hour   Intake 240 ml   Output 200 ml   Net 40 ml     Wt Readings from Last 3 Encounters:   10/09/21 86 5 kg (190 lb 11 2 oz)   09/30/21 83 9 kg (185 lb)   09/22/21 84 7 kg (186 lb 12 8 oz)     Temp Readings from Last 3 Encounters:   10/10/21 97 9 °F (36 6 °C)   10/05/21 98 4 °F (36 9 °C) (Oral)   09/29/21 (!) 97 °F (36 1 °C) (Temporal)     BP Readings from Last 3 Encounters:   10/10/21 106/61   10/07/21 139/76   10/05/21 131/66     Pulse Readings from Last 3 Encounters:   10/10/21 89   10/07/21 76   10/05/21 80        Physical Examination:  General:  Lying in bed, no acute distress  Eyes:  Mild conjunctival pallor present  ENT:  External examination of ears and nose unremarkable  Neck:  No obvious lymphadenopathy appreciated  Respiratory:  Bilateral entry present  CVS:  S1, S2 present  GI:  Soft, nondistended  CNS:  Active alert oriented x3  Extremities:  Trace edema in legs  Psych:  Conscious, coherent, oriented  Skin:  No new rash in legs    Invasive Devices:      Lab Results:   Results from last 7 days   Lab Units 10/10/21  0621 10/09/21  1447 10/05/21  0652 10/05/21  0005 10/04/21  1753 10/04/21  0525   WBC Thousand/uL 4 97 6 15  --   --   --  5 38   HEMOGLOBIN g/dL 7 1* 9 3* 8 2* 8 3* 8 7* 7 0*   HEMATOCRIT % 23 2* 29 8* 26 2* 26 3* 27 8* 22 6*   PLATELETS Thousands/uL 187 276  --   --   --  179   POTASSIUM mmol/L 3 8 3 2*  --   --   --  4 2   CHLORIDE mmol/L 109* 110*  --   --   --  112*   CO2 mmol/L 18* 16*  --   --   --  22   BUN mg/dL 44* 37*  --   --   --  36*   CREATININE mg/dL 3 60* 3 26*  --   --   --  3 23*   CALCIUM mg/dL 8 5 9 3  --   --   --  8 5   MAGNESIUM mg/dL 1 7  --   --   --   --   --    PHOSPHORUS mg/dL 4 9*  --   --   --   --   --        Other Studies:   CT abdomen pelvis wo contrast   Final Result by Beatriz Hanson MD (10/09 1625)      In the left renal fossa, there is a collection of mixed intermediate and high attenuation suggesting a hematoma with some bubbles of gas  Pigtail catheter is noted medial to the kidney  The renal pelvis may be collapsed  There appears to be some    hemorrhage and thickening extending in the combined interfascial plane of the retroperitoneum as well as some high density fluid within the pelvis suggesting hemoperitoneum  Superinfection of the kidney is possible  Urology evaluation is advised  Nonobstructing calculi in the right kidney  Some prominent loops of bowel are more likely large bowel suggesting ileus  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        This was discussed with Dr Aubree Rose at 4:20 PM               Workstation performed: [id number]         XR chest portable   Final Result by Fabio Patten MD (10/10 0703)      Medial left base slight atelectasis and or small infiltrate  Small left effusion      The study was marked in EPIC for immediate notification  Workstation performed: [id number]         VAS hemodialysis access duplex right upper limb avf    (Results Pending)       Portions of the record may have been created with voice recognition software  Occasional wrong word or "sound a like" substitutions may have occurred due to the inherent limitations of voice recognition software  Read the chart carefully and recognize, using context, where substitutions have occurred

## 2021-10-10 NOTE — ASSESSMENT & PLAN NOTE
Pt presented with right upper extremity swelling  Vascular surgery consulted on admission  F/U doppler US

## 2021-10-10 NOTE — CONSULTS
Consultation - Interventional Radiology  Rogelio Anna 77 y o  female MRN: [id number]  Unit/Bed#: [facility] 929-01 Encounter: [id number]        Consults     ASSESSMENT/PLAN:    76F well known to our service for chronic nephrostomy drainage    On September 20th her retrograde nephrostomy was completely dislodged    A new nephrostomy access was then placed at that time as well as a new retrograde    Patient returned on October 4th for a tube check revealing that the ureter was patent    The nephrostomy was then removed    She did complain of some flank fullness at that time  [facility] beam CT was performed    She returns today with flank pain  I personally reviewed her imaging  She has a renal/perinephric hematoma  She has very little renal parenchyma on that side and the architecture is distorted  There is no hydronephrosis    Hemoglobin is unchanged  No leukocytosis    Unfortunately she is borderline CKD 5 nearing dialysis    Recommendations  - no role for embolization as there is no evidence of active bleeding    It is likely that this is old blood    - drain placement into thick blood products is difficult as these drain slowly and there is persistent risk of new hemorrhage in a vascular bed    - blood may be seeded by the colonized bacteria of her conduit    - recommend broad-spectrum antibiotics and close follow-up    - no IR intervention indicated at this time    - please contact IR for any questions or concerns      Medical Problems     Problem List     * (Principal) Renal hematoma, left    Chronic saddle pulmonary embolism (HCC)    Bladder mass    Small bowel obstruction ([address] )    Obstructive uropathy    Overview Signed 5/17/2018 11:36 AM by Terell Aguirre MD     Obstructive uropathy and non functional bladder s/p supratrigonal cystectomy and ileal conduit 10/04/2016 by Dr Don Jefferson          Secondary hyperparathyroidism of renal origin ([address] )    Hypertension    Polycythemia vera ([address] )    Intraventricular hemorrhage ([facility] )    Anemia in CKD (chronic kidney disease)    Mass of urethra    Sepsis ([facility] )    Acute renal failure superimposed on stage 5 chronic kidney disease, not on chronic dialysis [facility])    Lab Results   Component Value Date    EGFR 13 10/09/2021    EGFR 13 10/04/2021    EGFR 14 10/03/2021    CREATININE 3 26 (H) 10/09/2021    CREATININE 3 23 (H) 10/04/2021    CREATININE 3 10 (H) 10/03/2021         Thoracic compression fracture (HCC)    Benign neoplasm of supratentorial region of brain ([facility] )    Meningioma (HCC)    Inverted T wave    Polycythemia    History of Clostridioides difficile colitis    History of shingles    Depression    Class 2 severe obesity due to excess calories with serious comorbidity and body mass index (BMI) of 35 0 to 35 9 in adult [facility])    Chronic thrombosis of subclavian vein (HCC)    Overview Signed 1/5/2021  9:05 AM by Silas Rodas MD     right         Hyperlipemia    Gross hematuria    Hydronephrosis of left kidney    Anemia    Constipation    Obstructive left pyelonephritis    Right upper quadrant cyst    Obesity, morbid (HCC)    Febrile illness    COVID-19    Edema of right upper extremity    Stage 5 chronic kidney disease not on chronic dialysis [facility])    Lab Results   Component Value Date    EGFR 13 10/09/2021    EGFR 13 10/04/2021    EGFR 14 10/03/2021    CREATININE 3 26 (H) 10/09/2021    CREATININE 3 23 (H) 10/04/2021    CREATININE 3 10 (H) 10/03/2021         Ambulatory dysfunction    Iron deficiency anemia due to chronic blood loss    Poor venous access                Reason for Consult / Principal Problem:    HPI: Inocencio Hayes is a 77y o  year old female who presents with Renal hematoma, left      Review of Systems      Past Medical History:  Past Medical History:   Diagnosis Date    Anxiety     Bowel obstruction ([facility] )     Chronic kidney disease     Chronic kidney disease (CKD), stage IV (severe) ([facility] )     stage IV    Chronic thrombosis of subclavian vein ([facility] right    Circulation problem     Compression fracture of cervical spine ([facility] )     COVID-19 07/2021    hospitalized     Hernia of abdominal cavity     History of kidney problems     History of transfusion     Hydronephrosis     Hypertension     IBS (irritable bowel syndrome)     Incontinence     Lung mass     Improving on PET/CT 1/2016    Polycythemia vera ([facility] )     Pulmonary embolism ([facility] ) 2014    Shingles     Urinary tract infection        Past Surgical History:  Past Surgical History:   Procedure Laterality Date    ABDOMINAL ADHESION SURGERY N/A 8/9/2020    Procedure: LYSIS ADHESIONS;  Surgeon: Di Medina DO;  [facility];  Service: General    BLADDER SUSPENSION      BOTOX INJECTION N/A 7/27/2016    Procedure: BOTOX INJECTION ;  Surgeon: Roscoe Bermeo MD;  [facility];  Service:     CHOLECYSTECTOMY N/A     COLONOSCOPY      CYSTECTOMY, RADICAL WITH ILEOCONDUIT N/A 10/4/2016    Procedure: Marilu Membreno ;  Surgeon: Roscoe Bermeo MD;  [facility];  Service:    Siva Beba W/ RETROGRADES Bilateral 7/27/2016    Procedure: CYSTOSCOPY; RETROGRADE PYELOGRAM ;  Surgeon: Roscoe Bermeo MD;  [facility];  Service:     HERNIA REPAIR      IR AV FISTULAGRAM/GRAFTOGRAM  2/10/2021    IR NEPHROSTOMY TO NEPHROURETERAL STENT  5/15/2021    IR NEPHROSTOMY TO NEPHROURETERAL STENT  6/9/2021    IR NEPHROSTOMY TUBE CHECK AND/OR REMOVAL  6/16/2021    IR NEPHROSTOMY TUBE CHECK/CHANGE/REPOSITION/REINSERTION/UPSIZE  5/14/2021    IR NEPHROSTOMY TUBE CHECK/CHANGE/REPOSITION/REINSERTION/UPSIZE  5/21/2021    IR NEPHROSTOMY TUBE CHECK/CHANGE/REPOSITION/REINSERTION/UPSIZE  9/16/2021    IR NEPHROSTOMY TUBE CHECK/CHANGE/REPOSITION/REINSERTION/UPSIZE  10/4/2021    IR NEPHROSTOMY TUBE PLACEMENT  5/10/2021    IR NEPHROSTOMY TUBE PLACEMENT  9/20/2021    IR NON-TUNNELED CENTRAL LINE PLACEMENT  7/17/2020    IR NON-TUNNELED CENTRAL LINE PLACEMENT  8/14/2020    IR NON-TUNNELED CENTRAL LINE PLACEMENT  7/16/2021    LAPAROTOMY N/A 8/9/2020    Procedure: LAPAROTOMY EXPLORATORY, PARASTOMAL HERNIA REPAIR WITH MESH;  Surgeon: Julito Molina DO;  [facility];  Service: General    VT ANASTOMOSIS,AV,ANY SITE Right 1/5/2021    Procedure: Creation of right brachiobasilic fistula; Surgeon: Hailey Chairez MD;  [facility];  Service: Vascular    VT COLONOSCOPY FLX DX W/COLLJ SPEC WHEN PFRMD N/A 8/31/2016    Procedure: COLONOSCOPY;  Surgeon: Arlene Wells MD;  Location: [facility]; Service: Gastroenterology    VT CYSTOSCOPY,INSERT URETERAL STENT Bilateral 7/27/2016    Procedure: STENT INSERTION; EXCISION OF MESH ;  Surgeon: Isa Wynne MD;  [facility];  Service: Urology    VT REVISE AV FISTULA,W/O THROMBECTOMY Right 9/30/2021    Procedure: Superficialization and transposition of right brachiobasilic fistula; Surgeon: Dee Chairez MD;  [facility];  Service: Vascular    TONSILLECTOMY      TUBAL LIGATION      WISDOM TOOTH EXTRACTION         Social History:  Social History     Substance and Sexual Activity   Alcohol Use Not Currently    Comment: n/s     Social History     Substance and Sexual Activity   Drug Use Not Currently    Comment: n/a     Social History     Tobacco Use   Smoking Status Never Smoker   Smokeless Tobacco Never Used   Tobacco Comment    n/a       Family History:  Family History   Problem Relation Age of Onset    Cancer Mother         small cell cancer     Heart disease Father     COPD Father     Heart disease Brother     Nephrolithiasis Brother        Allergies: Allergies   Allergen Reactions    Chlorhexidine Rash     petichi like rash when using chlorhexidine swabs prior to IV          Medications:  Medications Prior to Admission   Medication    acetaminophen (TYLENOL) 325 mg tablet    apixaban (Eliquis) 2 5 mg    atorvastatin (LIPITOR) 40 mg tablet    calcitriol (ROCALTROL) 0 25 mcg capsule    Cholecalciferol (VITAMIN D3) 1000 UNITS CAPS    citalopram (CeleXA) 20 mg tablet    levothyroxine 75 mcg tablet    loperamide (IMODIUM) 2 mg capsule    melatonin 3 mg    metoprolol tartrate (LOPRESSOR) 25 mg tablet    saccharomyces boulardii (Florastor) 250 mg capsule    sodium bicarbonate 650 mg tablet     Current Facility-Administered Medications   Medication Dose Route Frequency    acetaminophen (TYLENOL) tablet 650 mg  650 mg Oral Q6H PRN    acetaminophen (TYLENOL) tablet 975 mg  975 mg Oral Q8H [address]    aluminum-magnesium hydroxide-simethicone (MYLANTA) oral suspension 30 mL  30 mL Oral Q6H PRN    atorvastatin (LIPITOR) tablet 40 mg  40 mg Oral HS    [START ON 10/10/2021] calcitriol (ROCALTROL) capsule 0 25 mcg  0 25 mcg Oral Daily    [START ON 10/10/2021] cefTRIAXone (ROCEPHIN) 1,000 mg in dextrose 5 % 50 mL IVPB  1,000 mg Intravenous Q24H    [START ON 10/10/2021] cholecalciferol (VITAMIN D) oral liquid 800 Units  800 Units Oral Daily    [START ON 10/10/2021] citalopram (CeleXA) tablet 20 mg  20 mg Oral Daily    docusate sodium (COLACE) capsule 100 mg  100 mg Oral BID    [START ON 10/10/2021] levothyroxine tablet 75 mcg  75 mcg Oral Daily    melatonin tablet 3 mg  3 mg Oral HS    metoprolol tartrate (LOPRESSOR) tablet 25 mg  25 mg Oral BID    ondansetron (ZOFRAN) injection 4 mg  4 mg Intravenous Q6H PRN    potassium chloride 20 mEq IVPB (premix)  20 mEq Intravenous Once    [START ON 10/10/2021] saccharomyces boulardii (FLORASTOR) capsule 250 mg  250 mg Oral Daily    [START ON 10/10/2021] senna (SENOKOT) tablet 8 6 mg  1 tablet Oral Daily    sodium bicarbonate tablet 650 mg  650 mg Oral BID after meals    vancomycin (VANCOCIN) 1,250 mg in sodium chloride 0 9 % 250 mL IVPB  20 mg/kg (Adjusted) Intravenous Once    vancomycin (VANCOCIN) IVPB (premix in dextrose) 1,000 mg 200 mL  15 mg/kg (Adjusted) Intravenous Daily PRN       Vitals:  /62   Pulse 91   Temp 97 7 °F (36 5 °C)   Resp 22   Ht 5' (1 524 m)   Wt 86 5 kg (190 lb 11 2 oz)   SpO2 97%   BMI 37 24 kg/m²   Body mass index is 37 24 kg/m²  Weight (last 2 days)     Date/Time   Weight    10/09/21 1941   86 5 (190 7)              I/Os:  No intake or output data in the 24 hours ending 10/09/21 2028    PHYSICAL EXAM  None  Electronic consultation only      Lab Results and Cultures:   CBC with diff:   Lab Results   Component Value Date    WBC 6 15 10/09/2021    HGB 9 3 (L) 10/09/2021    HCT 29 8 (L) 10/09/2021    MCV 93 10/09/2021     10/09/2021    MCH 29 0 10/09/2021    MCHC 31 2 (L) 10/09/2021    RDW 16 4 (H) 10/09/2021    MPV 10 1 10/09/2021    NRBC 0 09/29/2021      BMP/CMP:  Lab Results   Component Value Date     12/17/2015    K 3 2 (L) 10/09/2021    K 3 7 12/17/2015     (H) 10/09/2021     12/17/2015    CO2 16 (L) 10/09/2021    CO2 28 10/04/2016    ANIONGAP 9 12/17/2015    BUN 37 (H) 10/09/2021    BUN 8 12/17/2015    CREATININE 3 26 (H) 10/09/2021    CREATININE 0 95 12/17/2015    GLUCOSE 138 10/04/2016    GLUCOSE 96 12/17/2015    CALCIUM 9 3 10/09/2021    CALCIUM 8 9 12/17/2015    AST 63 (H) 10/09/2021     (H) 11/06/2015    ALT 11 (L) 10/09/2021    ALT 25 11/06/2015    ALKPHOS 128 (H) 10/09/2021    ALKPHOS 215 (H) 11/06/2015    PROT 6 2 (L) 11/06/2015    BILITOT 0 49 11/06/2015    EGFR 13 10/09/2021   ,     Coags:   Lab Results   Component Value Date    PTT 35 10/09/2021    PTT 50 (H) 10/31/2015    INR 1 63 (H) 10/09/2021    INR 1 42 (H) 11/03/2015   ,   Results from last 7 days   Lab Units 10/09/21  1447   PTT seconds 35   INR  1 63*        Lipid Panel:   Lab Results   Component Value Date    CHOL 175 05/20/2015     Lab Results   Component Value Date    HDL 70 (H) 04/18/2019     Lab Results   Component Value Date    HDL 70 (H) 04/18/2019     Lab Results   Component Value Date    LDLCALC 125 (H) 04/18/2019     Lab Results   Component Value Date    TRIG 110 04/18/2019       HgbA1c:   Lab Results   Component Value Date    HGBA1C 5 5 09/02/2020    HGBA1C 5 1 05/20/2015       Blood Culture:   Lab Results   Component Value Date    BLOODCX No Growth After 5 Days  07/15/2021    BLOODCX No Growth After 5 Days  07/15/2021   ,   Urinalysis:   Lab Results   Component Value Date    COLORU Yellow 07/15/2021    COLORU Yellow 09/09/2015    CLARITYU Cloudy 07/15/2021    CLARITYU Cloudy 09/09/2015    SPECGRAV 1 020 07/15/2021    SPECGRAV <=1 005 09/09/2015    PHUR 8 5 (H) 07/15/2021    PHUR 6 0 09/09/2015    LEUKOCYTESUR Large (A) 07/15/2021    LEUKOCYTESUR Large (A) 09/09/2015    NITRITE Negative 07/15/2021    NITRITE Positive (A) 09/09/2015    PROTEINUA 30 (1+) (A) 09/09/2015    GLUCOSEU Negative 07/15/2021    GLUCOSEU Negative 09/09/2015    KETONESU Negative 07/15/2021    KETONESU Negative 09/09/2015    BILIRUBINUR Interference- unable to analyze (A) 07/15/2021    BILIRUBINUR Negative 09/09/2015    BLOODU Large (A) 07/15/2021    BLOODU Moderate (A) 09/09/2015   ,   Urine Culture:   Lab Results   Component Value Date    URINECX >100,000 cfu/ml  07/15/2021   ,   Wound Culure:  No results found for: WOUNDCULT    Imaging Studies: I have personally reviewed pertinent films in PACS    Counseling / Coordination of Care  Total time spent today  30 minutes  Electronic consultation only  Thank you for allowing me to participate in the care of Carroll Padilla  Please don't hesitate to contact us with any questions

## 2021-10-10 NOTE — PLAN OF CARE
Problem: MOBILITY - ADULT  Goal: Maintain or return to baseline ADL function  Description: INTERVENTIONS:  -  Assess patient's ability to carry out ADLs; assess patient's baseline for ADL function and identify physical deficits which impact ability to perform ADLs (bathing, care of mouth/teeth, toileting, grooming, dressing, etc )  - Assess/evaluate cause of self-care deficits   - Assess range of motion  - Assess patient's mobility; develop plan if impaired  - Assess patient's need for assistive devices and provide as appropriate  - Encourage maximum independence but intervene and supervise when necessary  - Involve family in performance of ADLs  - Assess for home care needs following discharge   - Consider OT consult to assist with ADL evaluation and planning for discharge  - Provide patient education as appropriate  Outcome: Progressing  Goal: Maintains/Returns to pre admission functional level  Description: INTERVENTIONS:  - Perform BMAT or MOVE assessment daily    - Set and communicate daily mobility goal to care team and patient/family/caregiver  - Collaborate with rehabilitation services on mobility goals if consulted  - Perform Range of Motion times a day  - Reposition patient every  hours    - Dangle patient  times a day  - Stand patient  times a day  - Ambulate patient times a day  - Out of bed to chair times a day   - Out of bed for meals times a day  - Out of bed for toileting  - Record patient progress and toleration of activity level   Outcome: Progressing

## 2021-10-10 NOTE — ASSESSMENT & PLAN NOTE
Hx of Polycythemia vera and MDS  Significant anemia secondary to blood loss in the past   The patient is currently off of the [facility] treatment for her PV and getting mainly Aranesp on every 28 day basis  Will consult hematology for evaluation

## 2021-10-10 NOTE — CONSULTS
Consultation - Infectious Disease   Gracie Esquivel 77 y o  female MRN: [id number]  Unit/Bed#: [facility] 929-01 Encounter: [id number]      IMPRESSION & RECOMMENDATIONS:   1  SIRS syndrome and possible sepsis, POA  Patient presented with progressing flank/abdominal discomfort likely due to problem 2  Hemoglobin has declined  She is without leukocytosis  On initial evaluation she was noted to have fever along with tachycardia which have improved  There is concern for possible secondary infection of her hematoma, suspicion remains low given her current presentation  Blood cultures are pending  Urine cultures reordered  At this time will continue empiric ceftriaxone  Discontinue vancomycin for now  Continue to trend fever curve/vitals  Repeat labs tomorrow  Repeat urine culture ordered  Follow-up pending blood culture  Monitor abdominal/flank exam  IR evaluation appreciated  Low threshold for repeat imaging if pain worsens/progresses  Urology/nephrology evaluation pending  Additional supportive care as per primary  If blood cultures remain without growth at 48 hours will likely discontinue further antibiotics    2  Left renal hematoma with chronic obstructive uropathy  Patient has a chronic obstructive uropathy involving the left side and recently had PCN removal   Subsequently developed bleeding at the site and hematoma on imaging likely due to chronic anticoagulation  Imaging reviewed  Vitals improved  Continue empiric antibiotic as above for now  Follow-up pending cultures  Ongoing follow-up by IR  Urology evaluation pending  Monitor hemoglobin closely  Transfusional support  Low threshold for repeat imaging  Additional interventions pending clinical course    3  Chronic kidney disease with fistula  Patient with baseline chronic kidney disease with fistula created in the right upper extremity  She has significant swelling which she reports has been present since after procedure    No dose adjustment required for ceftriaxone  Repeat labs tomorrow  Nephrology evaluation pending  Vascular surgery follow-up/imaging on going  Avoid PICC line if possible    4  History of C diff  Patient had a history of C diff in 2019  She has had repeat PCRs in July 2021 which were negative  She remains on antibiotic as above  Continue systemic antibiotic for now  Monitor stool output  Will hold off on C diff prophylaxis for now  Continue to trend fever curve/vitals    5  Polycythemia vera and MDS  Ongoing management/supportive care as per primary  Consider Oncology evaluation  6  Prior PE on anticoagulation  Likely risk factor for issues as above  AC as per primary  Above plan discussed briefly with the patient at bedside  Above plan discussed with primary service attending  ID consult service will continue to follow  HISTORY OF PRESENT ILLNESS:  Reason for Consult:  Renal hematoma    HPI: Joe Benjamin is a 77y o  year old female with chronic kidney disease, hypertension, obstructive uropathy with hydronephrosis and stent placement, now status post stent removal, polycythemia vera  Patient had a nonfunctional bladder with elevated bladder pressure leading to her obstructive pathology and she ultimately underwent cystectomy and ileal conduit placement in 2016  Patient presented to the hospital this time with abdominal pain  Patient noted to be febrile and tachycardic on evaluation  She was without leukocytosis  LFTs unremarkable  Procalcitonin was elevated  Blood cultures were collected  Recent procedures reviewed  On 10/09 the patient seemed to have undergone left nephrostomy tube check and removal  Her PCNU was noted to be in the appropriate position  Blood seemed to be noted within the nephrostomy catheter ostium and the patient then developed new left flank fullness  There was blood draining from the L PCNU  CT was done which at the time did not show significant perinephric hematoma    The wound was dressed  Patient on evaluation in the ER this time had CT scan done  In the left renal fossa there is no collection with high attenuating material suggestive of hematoma there was also some tracking with some high density fluid noted in the pelvis suggestive of hemoperitoneum  No obstructing calculi were noted  Patient's case was reviewed by IR  Patient was also seen separately by vascular surgery for arm swelling on the site of her AV fistula  No other acute events noted overnight  Patient is now afebrile  She remains without leukocytosis  Blood cultures pending  Patient's other vitals are stable  Labs are largely unremarkable  Procalcitonin elevated without clear significant  Recent ID evaluations reviewed  Recent cultures reviewed which have been polymicrobial   Multiple recent C diff PCRs have been negative  Wound/skin evaluation clinical images reviewed  No recent/signficant data and care everywhere  We are consulted at this time for further assistance with management  On evaluation, the patient reports feeling generally fatigued  She recalls her admission at the end of last month where she underwent vascular procedure  She ended with a prolonged hospitalization due to anemia  Patient reports that she has essentially felt unwell since being discharged  She has been having a difficult time managing alone at home  She recalls developing flank and abdominal discomfort about a day or 2 ago  Currently she reports some intermittent spasms into her abdomen  She reports poor appetite  Denies having any nausea or vomiting  Does not recall having other fevers prior  Overall exam has a very depressed affect  REVIEW OF SYSTEMS:  A complete 12 point system-based review of systems is negative other than that noted in the HPI      PAST MEDICAL HISTORY:  Past Medical History:   Diagnosis Date    Anxiety     Bowel obstruction ([address] )     Chronic kidney disease     Chronic kidney disease (CKD), stage IV (severe) (HCC)     stage IV    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    Circulation problem     Compression fracture of cervical spine [address] [address]     COVID-19 07/2021    hospitalized     Hernia of abdominal cavity     History of kidney problems     History of transfusion     Hydronephrosis     Hypertension     IBS (irritable bowel syndrome)     Incontinence     Lung mass     Improving on PET/CT 1/2016    Polycythemia vera ([facility] [address]     Pulmonary embolism ([facility] [address] ) 2014    Shingles     Urinary tract infection      Past Surgical History:   Procedure Laterality Date    ABDOMINAL ADHESION SURGERY N/A 8/9/2020    Procedure: LYSIS ADHESIONS;  Surgeon: Milena Gutierrez DO;  [facility];  Service: General    BLADDER SUSPENSION      BOTOX INJECTION N/A 7/27/2016    Procedure: BOTOX INJECTION ;  Surgeon: Lori Cm MD;  [facility];  Service:     CHOLECYSTECTOMY N/A     COLONOSCOPY      CYSTECTOMY, RADICAL WITH ILEOCONDUIT N/A 10/4/2016    Procedure: Kati Almonte ;  Surgeon: Lori Cm MD;  [facility];  Service:    Carry Vinod W/ RETROGRADES Bilateral 7/27/2016    Procedure: CYSTOSCOPY; RETROGRADE PYELOGRAM ;  Surgeon: Lori Cm MD;  [facility];  Service:     HERNIA REPAIR      IR AV FISTULAGRAM/GRAFTOGRAM  2/10/2021    IR NEPHROSTOMY TO NEPHROURETERAL STENT  5/15/2021    IR NEPHROSTOMY TO NEPHROURETERAL STENT  6/9/2021    IR NEPHROSTOMY TUBE CHECK AND/OR REMOVAL  6/16/2021    IR NEPHROSTOMY TUBE CHECK/CHANGE/REPOSITION/REINSERTION/UPSIZE  5/14/2021    IR NEPHROSTOMY TUBE CHECK/CHANGE/REPOSITION/REINSERTION/UPSIZE  5/21/2021    IR NEPHROSTOMY TUBE CHECK/CHANGE/REPOSITION/REINSERTION/UPSIZE  9/16/2021    IR NEPHROSTOMY TUBE CHECK/CHANGE/REPOSITION/REINSERTION/UPSIZE  10/4/2021    IR NEPHROSTOMY TUBE PLACEMENT  5/10/2021    IR NEPHROSTOMY TUBE PLACEMENT  9/20/2021    IR NON-TUNNELED CENTRAL LINE PLACEMENT  2020    IR NON-TUNNELED CENTRAL LINE PLACEMENT  2020    IR NON-TUNNELED CENTRAL LINE PLACEMENT  2021    LAPAROTOMY N/A 2020    Procedure: LAPAROTOMY EXPLORATORY, PARASTOMAL HERNIA REPAIR WITH MESH;  Surgeon: Irene Hill DO;  [facility];  Service: General    PA ANASTOMOSIS,AV,ANY SITE Right 2021    Procedure: Creation of right brachiobasilic fistula; Surgeon: Dianelys Chairez MD;  [facility];  Service: Vascular    PA COLONOSCOPY FLX DX W/COLLJ SPEC WHEN PFRMD N/A 2016    Procedure: COLONOSCOPY;  Surgeon: Gwen Aranda MD;  [facility]; Service: Gastroenterology    PA CYSTOSCOPY,INSERT URETERAL STENT Bilateral 2016    Procedure: STENT INSERTION; EXCISION OF MESH ;  Surgeon: Jose Isaac MD;  [facility];  Service: Urology    PA REVISE AV FISTULA,W/O THROMBECTOMY Right 2021    Procedure: Superficialization and transposition of right brachiobasilic fistula; Surgeon: Padmini Chairez MD;  [facility];  Service: Vascular    TONSILLECTOMY      TUBAL LIGATION      WISDOM TOOTH EXTRACTION         FAMILY HISTORY:  Non-contributory    SOCIAL HISTORY:  Social History   Social History     Substance and Sexual Activity   Alcohol Use Not Currently    Comment: n/s     Social History     Substance and Sexual Activity   Drug Use Not Currently    Comment: n/a     Social History     Tobacco Use   Smoking Status Never Smoker   Smokeless Tobacco Never Used   Tobacco Comment    n/a       ALLERGIES:  Allergies   Allergen Reactions    Chlorhexidine Rash     petichi like rash when using chlorhexidine swabs prior to IV  MEDICATIONS:  All current active medications have been reviewed      PHYSICAL EXAM:  Temp:  [97 7 °F (36 5 °C)-102 4 °F (39 1 °C)] 97 8 °F (36 6 °C)  HR:  [] 91  Resp:  [20-26] 20  BP: (104-150)/(57-67) 106/61  SpO2:  [92 %-98 %] 95 %  Temp (24hrs), Av 4 °F (36 9 °C), Min:97 7 °F (36 5 °C), Max:102 4 °F (39 1 °C)  Current: Temperature: 97 8 °F (36 6 °C)    Intake/Output Summary (Last 24 hours) at 10/10/2021 1154  Last data filed at 10/10/2021 0743  Gross per 24 hour   Intake --   Output 200 ml   Net -200 ml       General Appearance:  Chronically ill-appearing, depressed affect, nontoxic and in no acute distress  Head:  Normocephalic, without obvious abnormality, atraumatic   Eyes:  Conjunctiva pink and sclera anicteric, both eyes   Nose: Nares normal, mucosa normal, no drainage   Throat: Oropharynx moist without lesions   Neck: Supple, symmetrical, no adenopathy, no tenderness/mass/nodules   Back:   Patient unable to fully turn at this time  Was able to evaluate for left lateral side/flank with some fullness noted  There is no tenderness when I am palpating the site  The patient does have some tenderness when I tracked forward to the left lower quadrant  Lungs:   Clear to auscultation bilaterally, respirations unlabored on nasal cannula   Chest Wall:  No tenderness or deformity   Heart:  RRR; no murmur, rub or gallop appreciated   Abdomen:   Soft, again mild discomfort in the left lower quadrant, nondistended, urostomy noted with clear yellow urine  Patient does not recall recent blood in the urostomy  Extremities: No cyanosis, clubbing; significant edema noted in the right upper extremity  Currently wrapped  Skin: No rashes or lesions  No draining wounds noted  Patient has extensive ecchymoses along her left upper extremity  Femoral line catheter in place on the left groin, unremarkable  Lymph nodes: Cervical, supraclavicular nodes normal   Neurologic: Patient is alert and oriented to person, place and time  She has difficulty recalling the dates of her multiple procedures  She is really moving her upper extremities  Limited range of motion in her lower legs reportedly from fatigue    Her participation with exam is limited and the patient has a very depressed affect on exam  LABS, IMAGING, & OTHER STUDIES:  Lab Results:  I have personally reviewed pertinent labs  Results from last 7 days   Lab Units 10/10/21  0621 10/09/21  1447 10/05/21  0652 10/04/21  0525   WBC Thousand/uL 4 97 6 15  --  5 38   HEMOGLOBIN g/dL 7 1* 9 3* 8 2* 7 0*   PLATELETS Thousands/uL 187 276  --  179     Results from last 7 days   Lab Units 10/10/21  0621 10/09/21  1447   POTASSIUM mmol/L 3 8 3 2*   CHLORIDE mmol/L 109* 110*   CO2 mmol/L 18* 16*   BUN mg/dL 44* 37*   CREATININE mg/dL 3 60* 3 26*   EGFR ml/min/1 73sq m 12 13   CALCIUM mg/dL 8 5 9 3   AST U/L 72* 63*   ALT U/L 15 11*   ALK PHOS U/L 81 128*     Results from last 7 days   Lab Units 10/10/21  0501 10/09/21  1447   BLOOD CULTURE  Received in Microbiology Lab  Culture in Progress  Received in Microbiology Lab  Culture in Progress  Imaging Studies:   I have personally reviewed pertinent imaging study reports and images in PACS  Other Studies:   I have personally reviewed pertinent reports

## 2021-10-10 NOTE — PROGRESS NOTES
[facility]  Progress Note - Shyann Camacho [birth year], 77 y o  female MRN: [id number]  Unit/Bed#: [facility] 929-01 Encounter: [id number]  Primary Care Provider: Gabbi Tillman MD   Date and time admitted to hospital: 10/9/2021  2:19 PM    * Renal hematoma, left  Assessment & Plan    Left renal hematoma:   Presented with left renal hematoma  Pigtail catheter is noted medial to kidney  Renal pelvis may be collapsed  Hemorrhage and thickening extending in the combined interfascial place of retrospective as well as some high density fluid within  Urology and IR are aware  No plan for intervention per urology at this time  Observation and treat the infection    Low threshold for repeat imaging if pain or anemia worsens/progresses        Obstructive uropathy  Assessment & Plan  Obstructive nephropathy  Chronic bilateral hydronephrosis stents were recently removed  IR consulted    Sepsis [facility])  Assessment & Plan  +SIRS, unknown source  -Presented with temp 102 4, tachycardia, tachypnea and elevated pro calcitonin possibly suggesting urinary source of infection      -ID is on board  -Vanco discontinued by ID continue with ceftriaxone    -Blood culture growing G- rods  -F/U urine cx      Localized swelling of right upper extremity  Assessment & Plan  Pt presented with right upper extremity swelling  Vascular surgery consulted on admission  F/U doppler US    Pleural effusion  Assessment & Plan  Mild to moderate pleural effusion on CXR  Discussed with IR who recommended placing thoracentesis order  They will evaluate for drainage    Acute renal failure superimposed on stage 5 chronic kidney disease, not on chronic dialysis [facility])  Assessment & Plan  Lab Results   Component Value Date    EGFR 12 10/10/2021    EGFR 13 10/09/2021    EGFR 13 10/04/2021    CREATININE 3 60 (H) 10/10/2021    CREATININE 3 26 (H) 10/09/2021    CREATININE 3 23 (H) 10/04/2021   Stage 5 chronic kidney disease not on chronic dialysis  Patient has progressive CKD probably secondary to obstructive nephropathy  Avoid nephrotoxic medication  Daily BMP  Nephrology consult, appreciate input    Anemia due to stage 5 chronic kidney disease, not on chronic dialysis [facility])  Assessment & Plan  Anemia 2/2 CKD with component of acute blood loss anemia  Hg trending down from 9 3 to 7 1  F/U anemia work up  Will obtain H&H q 8h   Transfuse of hg<7    Polycythemia vera ([address] )  Assessment & Plan  Hx of Polycythemia vera and MDS  Significant anemia secondary to blood loss in the past   The patient is currently off of the [facility] treatment for her PV and getting mainly Aranesp on every 28 day basis  Will consult hematology for evaluation    Hypertension  Assessment & Plan  Continue with metoprolol 25 mg bid    Chronic saddle pulmonary embolism (HCC)  Assessment & Plan  Chronic saddle pulmonary embolus  Eliquis 2 5 mg bid at home, currently on hold for now if IR wants to do intervention  Will also hold as hg is trending down 7 3 from 9 1          VTE Pharmacologic Prophylaxis: VTE Score: 13 High Risk (Score >/= 5) - Pharmacological DVT Prophylaxis Contraindicated  Sequential Compression Devices Ordered  Patient Centered Rounds: I performed bedside rounds with nursing staff today  Discussions with Specialists or Other Care Team Provider: ID    Education and Discussions with Family / Patient: Attempted to update  (son) via phone  Left voicemail  Time Spent for Care: 30 minutes  More than 50% of total time spent on counseling and coordination of care as described above  Current Length of Stay: 1 day(s)  Current Patient Status: Inpatient   Certification Statement: The patient will continue to require additional inpatient hospital stay due to anemia, sepsis  Discharge Plan: Anticipate discharge in >72 hrs to discharge location to be determined pending rehab evaluations      Code Status: Level 1 - Full Code    Subjective:   Patient seen examined at bedside  Patient admits to feeling tired and having left lower quadrant abdominal pain  Objective:     Vitals:   Temp (24hrs), Av 8 °F (36 6 °C), Min:97 7 °F (36 5 °C), Max:97 9 °F (36 6 °C)    Temp:  [97 7 °F (36 5 °C)-97 9 °F (36 6 °C)] 97 9 °F (36 6 °C)  HR:  [] 89  Resp:  [17-24] 17  BP: (104-135)/(60-64) 106/61  SpO2:  [95 %-98 %] 95 %  Body mass index is 37 24 kg/m²  Input and Output Summary (last 24 hours): Intake/Output Summary (Last 24 hours) at 10/10/2021 1601  Last data filed at 10/10/2021 1358  Gross per 24 hour   Intake 240 ml   Output 200 ml   Net 40 ml       Physical Exam:   Physical Exam  Vitals reviewed  Constitutional:       Appearance: She is ill-appearing  HENT:      Head: Normocephalic and atraumatic  Right Ear: External ear normal       Left Ear: External ear normal       Nose: Nose normal       Mouth/Throat:      Mouth: Mucous membranes are moist    Eyes:      Extraocular Movements: Extraocular movements intact  Cardiovascular:      Rate and Rhythm: Normal rate and regular rhythm  Pulses: Normal pulses  Heart sounds: Normal heart sounds  Pulmonary:      Effort: Pulmonary effort is normal       Comments: Decreased breath sounds left lower lobe  Abdominal:      General: Abdomen is flat  Palpations: Abdomen is soft  Tenderness: There is abdominal tenderness  Musculoskeletal:         General: Normal range of motion  Cervical back: Normal range of motion  Right lower leg: Edema present  Left lower leg: Edema present  Comments: RUE swelling     Neurological:      General: No focal deficit present  Mental Status: She is alert     Psychiatric:         Mood and Affect: Mood normal          Behavior: Behavior normal           Additional Data:     Labs:  Results from last 7 days   Lab Units 10/10/21  0621 10/09/21  1447   WBC Thousand/uL 4 97 6 15   HEMOGLOBIN g/dL 7 1* 9 3*   HEMATOCRIT % 23 2* 29 8* PLATELETS Thousands/uL 187 276   BANDS PCT %  --  8   LYMPHO PCT %  --  1*   MONO PCT %  --  5   EOS PCT %  --  0     Results from last 7 days   Lab Units 10/10/21  0621   SODIUM mmol/L 138   POTASSIUM mmol/L 3 8   CHLORIDE mmol/L 109*   CO2 mmol/L 18*   BUN mg/dL 44*   CREATININE mg/dL 3 60*   ANION GAP mmol/L 11   CALCIUM mg/dL 8 5   ALBUMIN g/dL 3 0*   TOTAL BILIRUBIN mg/dL 1 60*   ALK PHOS U/L 81   ALT U/L 15   AST U/L 72*   GLUCOSE RANDOM mg/dL 107     Results from last 7 days   Lab Units 10/09/21  1447   INR  1 63*             Results from last 7 days   Lab Units 10/10/21  0621 10/09/21  1447   LACTIC ACID mmol/L  --  1 7   PROCALCITONIN ng/ml 78 78* 21 02*       Lines/Drains:  Invasive Devices     Central Venous Catheter Line            CVC Central Lines 10/09/21 Triple Left Femoral <1 day          Peripheral Intravenous Line            Peripheral IV Left;Ventral (anterior) Forearm -- days          Line            Hemodialysis AV Fistula 09/30/21 Right Upper arm 10 days          Drain            Urostomy Ileal conduit RUQ 1831 days                Central Line:  Goal for removal: Will discontinue when hemodynamically stable               Imaging: Reviewed radiology reports from this admission including: abdominal/pelvic CT    Recent Cultures (last 7 days):   Results from last 7 days   Lab Units 10/09/21  1447 10/09/21  1444   GRAM STAIN RESULT  Gram negative rods* Gram negative rods*       Last 24 Hours Medication List:   Current Facility-Administered Medications   Medication Dose Route Frequency Provider Last Rate    acetaminophen  650 mg Oral Q6H PRN Jenniffer Bragg MD      acetaminophen  975 mg Oral Q8H [address] Jenniffer Bragg MD      aluminum-magnesium hydroxide-simethicone  30 mL Oral Q6H PRN Jenniffer Bragg MD      atorvastatin  40 mg Oral HS Jenniffer Bragg MD      calcitriol  0 25 mcg Oral Daily Jenniffer Bragg MD      cefTRIAXone  1,000 mg Intravenous Q24H Jenniffer Bragg MD     Renaldo Pert cholecalciferol  800 Units Oral Daily Clarence Ochoa MD      citalopram  20 mg Oral Daily Clarence Ochoa MD      docusate sodium  100 mg Oral BID Clarence Ochoa MD      fentanyl citrate (PF)  25 mcg Intravenous Q2H PRN Clarence Ochoa MD      furosemide  20 mg Oral Daily Clarence Ochoa MD      levothyroxine  75 mcg Oral Daily Clarence Ochoa MD      melatonin  3 mg Oral HS Clarence Ochoa MD      metoprolol tartrate  25 mg Oral BID Clarence Ochoa MD      ondansetron  4 mg Intravenous Q6H PRN Clarence Ochoa MD      saccharomyces boulardii  250 mg Oral Daily Clarence Ochoa MD      senna  1 tablet Oral Daily Clarence Ochoa MD      sodium bicarbonate  650 mg Oral BID after meals Clarence Ochoa MD          Today, Patient Was Seen By: Eudora Heimlich, DO    **Please Note: This note may have been constructed using a voice recognition system  **

## 2021-10-10 NOTE — PLAN OF CARE
Problem: MOBILITY - ADULT  Goal: Maintain or return to baseline ADL function  Description: INTERVENTIONS:  -  Assess patient's ability to carry out ADLs; assess patient's baseline for ADL function and identify physical deficits which impact ability to perform ADLs (bathing, care of mouth/teeth, toileting, grooming, dressing, etc )  - Assess/evaluate cause of self-care deficits   - Assess range of motion  - Assess patient's mobility; develop plan if impaired  - Assess patient's need for assistive devices and provide as appropriate  - Encourage maximum independence but intervene and supervise when necessary  - Involve family in performance of ADLs  - Assess for home care needs following discharge   - Consider OT consult to assist with ADL evaluation and planning for discharge  - Provide patient education as appropriate  Outcome: Progressing  Goal: Maintains/Returns to pre admission functional level  Description: INTERVENTIONS:  - Perform BMAT or MOVE assessment daily    - Set and communicate daily mobility goal to care team and patient/family/caregiver  - Collaborate with rehabilitation services on mobility goals if consulted  - Reposition patient every 2 hours    - Dangle patient 3 times a day  - Stand patient 3 times a day  - Ambulate patient 3 times a day  - Out of bed to chair 3 times a day   - Out of bed for meals 3 times a day  - Out of bed for toileting  - Record patient progress and toleration of activity level   Outcome: Progressing     Problem: Potential for Falls  Goal: Patient will remain free of falls  Description: INTERVENTIONS:  - Educate patient/family on patient safety including physical limitations  - Instruct patient to call for assistance with activity   - Consult OT/PT to assist with strengthening/mobility   - Keep Call bell within reach  - Keep bed low and locked with side rails adjusted as appropriate  - Keep care items and personal belongings within reach  - Initiate and maintain comfort rounds  - Make Fall Risk Sign visible to staff  - Offer Toileting every 2 Hours, in advance of need  - Initiate/Maintain bed alarm  - Apply yellow socks and bracelet for high fall risk patients  - Consider moving patient to room near nurses station  Outcome: Progressing     Problem: Prexisting or High Potential for Compromised Skin Integrity  Goal: Skin integrity is maintained or improved  Description: INTERVENTIONS:  - Identify patients at risk for skin breakdown  - Assess and monitor skin integrity  - Assess and monitor nutrition and hydration status  - Monitor labs   - Assess for incontinence   - Turn and reposition patient  - Assist with mobility/ambulation  - Relieve pressure over bony prominences  - Avoid friction and shearing  - Provide appropriate hygiene as needed including keeping skin clean and dry  - Evaluate need for skin moisturizer/barrier cream  - Collaborate with interdisciplinary team   - Patient/family teaching  - Consider wound care consult   Outcome: Progressing

## 2021-10-10 NOTE — ASSESSMENT & PLAN NOTE
Anemia 2/2 CKD with component of acute blood loss anemia  Hg trending down from 9 3 to 7 1  F/U anemia work up  Will obtain H&H q 8h   Transfuse of hg<7

## 2021-10-10 NOTE — ASSESSMENT & PLAN NOTE
Mild to moderate pleural effusion on CXR  Discussed with IR who recommended placing thoracentesis order  They will evaluate for drainage

## 2021-10-10 NOTE — ASSESSMENT & PLAN NOTE
Chronic saddle pulmonary embolus  Eliquis 2 5 mg bid at home, currently on hold for now if IR wants to do intervention     Will also hold as hg is trending down 7 3 from 9 1

## 2021-10-10 NOTE — ASSESSMENT & PLAN NOTE
Lab Results   Component Value Date    EGFR 12 10/10/2021    EGFR 13 10/09/2021    EGFR 13 10/04/2021    CREATININE 3 60 (H) 10/10/2021    CREATININE 3 26 (H) 10/09/2021    CREATININE 3 23 (H) 10/04/2021   Stage 5 chronic kidney disease not on chronic dialysis  Patient has progressive CKD probably secondary to obstructive nephropathy  Avoid nephrotoxic medication  Daily BMP  Nephrology consult, appreciate input

## 2021-10-10 NOTE — CONSULTS
UROLOGY CONSULTATION NOTE     Patient Identifiers: Nadine Milan (MRN [id number])  Service Requesting Consultation:  Medicine  Service Providing Consultation:  Urology, Bill Albert MD    Date of Service: 10/10/2021  Inpatient consult to Urology  Consult performed by: Bill Albert MD  Consult ordered by: Marilin Cramer MD          Reason for Consultation:  Obstructive uropathy, left renal hematoma    History of Present Illness:     Nadine Milan is a 77 y o  old with a history of super trigonal cystectomy with ileal conduit creation by Dr Erendira Ledezma in 2016 due to bladder dysfunction and elevated bladder pressures  She developed a parastomal hernia requiring surgical repair in 2019  She also developed a left ureteral anastomotic stricture requiring chronic diversion  She was managed for the last several months with nephrostomy tube and nephroureteral catheter  On 10/04, she was converted to nephroureteral catheter exiting through her stoma, and the left nephrostomy tube was removed  She return to the hospital emergency department with left flank pain as well as fever and diarrhea  She also has history of chronic kidney disease stage 5 and recently had a right upper extremity AV fistula placed in anticipation of hemodialysis  Additionally has history of previous C diff infection  She did have fever on presentation without leukocytosis  Since admission she has been afebrile  CT revealed abnormal appearance of left kidney consistent with hematoma  Left nephroureteral stent is in good position  Of note patient is also anticoagulated due to history of PE      Past Medical, Past Surgical History:     Past Medical History:   Diagnosis Date    Anxiety     Bowel obstruction (HCC)     Chronic kidney disease     Chronic kidney disease (CKD), stage IV (severe) (HCC)     stage IV    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    Circulation problem     [facility]ression fracture [facility]     COVID-19 07/2021    hospitalized     Hernia of abdominal cavity     History of kidney problems     History of transfusion     Hydronephrosis     Hypertension     IBS (irritable bowel syndrome)     Incontinence     Lung mass     Improving on PET/CT 1/2016    Polycythemia vera ([facility] )     Pulmonary embolism ([facility] ) 2014    Shingles     Urinary tract infection    :    Past Surgical History:   Procedure Laterality Date    ABDOMINAL ADHESION SURGERY N/A 8/9/2020    Procedure: LYSIS ADHESIONS;  Surgeon: Irene Hill DO;  [facility];  Service: General    BLADDER SUSPENSION      BOTOX INJECTION N/A 7/27/2016    Procedure: BOTOX INJECTION ;  Surgeon: Jose Isaac MD;  [facility];  Service:     CHOLECYSTECTOMY N/A     COLONOSCOPY      CYSTECTOMY, RADICAL WITH ILEOCONDUIT N/A 10/4/2016    Procedure: Garrison Richardson ;  Surgeon: Jose Isaac MD;  [facility];  Service:    Inga Gypsy W/ RETROGRADES Bilateral 7/27/2016    Procedure: CYSTOSCOPY; RETROGRADE PYELOGRAM ;  Surgeon: Jose Isaac MD;  [facility];  Service:     HERNIA REPAIR      IR AV FISTULAGRAM/GRAFTOGRAM  2/10/2021    IR NEPHROSTOMY TO NEPHROURETERAL STENT  5/15/2021    IR NEPHROSTOMY TO NEPHROURETERAL STENT  6/9/2021    IR NEPHROSTOMY TUBE CHECK AND/OR REMOVAL  6/16/2021    IR NEPHROSTOMY TUBE CHECK/CHANGE/REPOSITION/REINSERTION/UPSIZE  5/14/2021    IR NEPHROSTOMY TUBE CHECK/CHANGE/REPOSITION/REINSERTION/UPSIZE  5/21/2021    IR NEPHROSTOMY TUBE CHECK/CHANGE/REPOSITION/REINSERTION/UPSIZE  9/16/2021    IR NEPHROSTOMY TUBE CHECK/CHANGE/REPOSITION/REINSERTION/UPSIZE  10/4/2021    IR NEPHROSTOMY TUBE PLACEMENT  5/10/2021    IR NEPHROSTOMY TUBE PLACEMENT  9/20/2021    IR NON-TUNNELED CENTRAL LINE PLACEMENT  7/17/2020    IR NON-TUNNELED CENTRAL LINE PLACEMENT  8/14/2020    IR NON-TUNNELED CENTRAL LINE PLACEMENT  7/16/2021    LAPAROTOMY N/A 8/9/2020    Procedure: LAPAROTOMY EXPLORATORY, PARASTOMAL HERNIA REPAIR WITH MESH;  Surgeon: Jadon Sher DO;  [facility];  Service: General    MA ANASTOMOSIS,AV,ANY SITE Right 1/5/2021    Procedure: Creation of right brachiobasilic fistula; Surgeon: Tommy Chairez MD;  [facility];  Service: Vascular    MA COLONOSCOPY FLX DX W/COLLJ SPEC WHEN PFRMD N/A 8/31/2016    Procedure: COLONOSCOPY;  Surgeon: Fariba Kaufman MD;  [facility]; Service: Gastroenterology    MA CYSTOSCOPY,INSERT URETERAL STENT Bilateral 7/27/2016    Procedure: STENT INSERTION; EXCISION OF MESH ;  Surgeon: Nely Wolf MD;  [facility];  Service: Urology    MA REVISE AV FISTULA,W/O THROMBECTOMY Right 9/30/2021    Procedure: Superficialization and transposition of right brachiobasilic fistula;   Surgeon: Mone Chairez MD;  [facility];  Service: Vascular    TONSILLECTOMY      TUBAL LIGATION      WISDOM TOOTH EXTRACTION     :    Medications, Allergies:     Current Facility-Administered Medications   Medication Dose Route Frequency    acetaminophen (TYLENOL) tablet 650 mg  650 mg Oral Q6H PRN    acetaminophen (TYLENOL) tablet 975 mg  975 mg Oral Q8H [address]    aluminum-magnesium hydroxide-simethicone (MYLANTA) oral suspension 30 mL  30 mL Oral Q6H PRN    atorvastatin (LIPITOR) tablet 40 mg  40 mg Oral HS    calcitriol (ROCALTROL) capsule 0 25 mcg  0 25 mcg Oral Daily    cefTRIAXone (ROCEPHIN) 1,000 mg in dextrose 5 % 50 mL IVPB  1,000 mg Intravenous Q24H    cholecalciferol (VITAMIN D) oral liquid 800 Units  800 Units Oral Daily    citalopram (CeleXA) tablet 20 mg  20 mg Oral Daily    docusate sodium (COLACE) capsule 100 mg  100 mg Oral BID    fentanyl citrate (PF) 100 MCG/2ML 25 mcg  25 mcg Intravenous Q2H PRN    furosemide (LASIX) tablet 20 mg  20 mg Oral Daily    levothyroxine tablet 75 mcg  75 mcg Oral Daily    melatonin tablet 3 mg  3 mg Oral HS    metoprolol tartrate (LOPRESSOR) tablet 25 mg  25 mg Oral BID    ondansetron (ZOFRAN) injection 4 mg  4 mg Intravenous Q6H PRN    saccharomyces boulardii (FLORASTOR) capsule 250 mg  250 mg Oral Daily    senna (SENOKOT) tablet 8 6 mg  1 tablet Oral Daily    sodium bicarbonate tablet 650 mg  650 mg Oral BID after meals       Allergies: Allergies   Allergen Reactions    Chlorhexidine Rash     petichi like rash when using chlorhexidine swabs prior to IV     :    Social and Family History:   Social History:   Social History     Tobacco Use    Smoking status: Never Smoker    Smokeless tobacco: Never Used    Tobacco comment: n/a   Vaping Use    Vaping Use: Never used   Substance Use Topics    Alcohol use: Not Currently     Comment: n/s    Drug use: Not Currently     Comment: n/a     Social History     Tobacco Use   Smoking Status Never Smoker   Smokeless Tobacco Never Used   Tobacco Comment    n/a       Family History:  Family History   Problem Relation Age of Onset    Cancer Mother         small cell cancer     Heart disease Father     COPD Father     Heart disease Brother     Nephrolithiasis Brother    :     Review of Systems:     General: Fever, chills, or night sweats: negative  Cardiac: Negative for chest pain  Pulmonary: Negative for shortness of breath  Gastrointestinal: Abdominal pain negative  Nausea, vomiting, or diarrhea negative,  Genitourinary: See HPI above  Patient does not have hematuria  All other systems queried were negative  Physical Exam:   General: Patient is pleasant and in NAD  Awake and alert  /61   Pulse 91   Temp 97 8 °F (36 6 °C)   Resp 20   Ht 5' (1 524 m)   Wt 86 5 kg (190 lb 11 2 oz)   SpO2 95%   BMI 37 24 kg/m² Temp (24hrs), Av 4 °F (36 9 °C), Min:97 7 °F (36 5 °C), Max:102 4 °F (39 1 °C)  current; Temperature: 97 8 °F (36 6 °C)  I/O last 24 hours:   In: -   Out: 200 [Urine:200]  Head: Normocephalic, without obvious abnormality, atraumatic  Eyes: negative  Lungs: clear to auscultation bilaterally  Heart: regular rate and rhythm  Abdomen: soft, non-tender; bowel sounds normal; no masses,  no organomegaly  Extremities: edema Of the right upper extremity with Ace wrap  Full range of motion  Neurologic: Grossly normal    :  Urostomy in the right lower quadrant draining clear yellow urine  Stoma is pink and viable  No significant hernia  No CVA tenderness  Labs:     Lab Results   Component Value Date    HGB 7 1 (L) 10/10/2021    HCT 23 2 (L) 10/10/2021    WBC 4 97 10/10/2021     10/10/2021   ]    Lab Results   Component Value Date     12/17/2015    K 3 8 10/10/2021     (H) 10/10/2021    CO2 18 (L) 10/10/2021    BUN 44 (H) 10/10/2021    CREATININE 3 60 (H) 10/10/2021    CALCIUM 8 5 10/10/2021    GLUCOSE 138 10/04/2016   ]    Imaging:   I personally reviewed the images and report of the following studies, and reviewed them with the patient:    CT Abdomen: Reviewed by me personally, with the above-noted findings of left renal hematoma and nephroureteral stent in good position  ASSESSMENT:     77 y o  old female with  left renal hematoma after Interventional Radiology procedure in nephrostomy removal   Possibility of AV fistula, though she is currently hemodynamically stable and hemoglobin is stable as well  No signs of active bleeding  Fever on admission, possibly due to hematoma versus infection  PLAN:     Discussed the findings with the patient in detail this morning  She understands potential etiologies of her symptoms  No intervention is planned at this time  There is no sign of active bleeding, and urine is clear and draining well  Appreciate ID involvement and recommendations  Will continue to monitor  Thank you for allowing me to participate in this patients care  Please do not hesitate to call with any additional questions    Franco Luna MD

## 2021-10-10 NOTE — CASE MANAGEMENT
Not a bundle  Pt is a less than 30 day readmission  Met with pt & explained CM role  Confirm info is correct  Pt lives alone in 2 sty home with 6 nieves  Bedroom & bath on 2nd flr  Reports IPTA  DME rw & cane  Family or neighbor transports  H/o [facility] & open with [facility] for PT  Agreeable to referral to resume  H/o rhb at [facility]  Denies any MH, D&A tx  Uses [facility] [address]  Emergency contact son Kayce Mix [phone]  PT/OT evals pending  Cm to follow for dc planning  Referral to [facility] sent for now to resume HHPT  CM reviewed d/c planning process including the following: identifying help at home, patient preference for d/c planning needs, Discharge Lounge, [facility] Meds to Bed program, availability of treatment team to discuss questions or concerns patient and/or family may have regarding understanding medications and recognizing signs and symptoms once discharged  CM also encouraged patient to follow up with all recommended appointments after discharge  Patient advised of importance for patient and family to participate in managing patients medical well being

## 2021-10-10 NOTE — CONSULTS
Vancomycin therapy has been discontinued  Pharmacy will sign off  Thank you for this consult  Please do not hesitate to call us with questions or re-consult us if the need arises       Karlene Olivo, PharmD, [address] [facility] and Internal Medicine Clinical Pharmacist  [phone] or via [place]

## 2021-10-10 NOTE — ASSESSMENT & PLAN NOTE
Left renal hematoma:   Presented with left renal hematoma  Pigtail catheter is noted medial to kidney  Renal pelvis may be collapsed  Hemorrhage and thickening extending in the combined interfascial place of retrospective as well as some high density fluid within     Urology and IR are aware  No plan for intervention per urology at this time  Observation and treat the infection    Low threshold for repeat imaging if pain or anemia worsens/progresses

## 2021-10-10 NOTE — CONSULTS
Consultation - Vascular Surgery   Sharon Granda 77 y o  female MRN: [id number]  Unit/Bed#: [facility] 929-01 Encounter: [id number]      Assessment/Plan      Assessment:  77year old female s/p RUE BVT on 9/30/21 with right upper extremity swelling, likely secondary to venous hypertension    Plan:  -will obtain HD Duplex   -patient needs arm elevated above level of the heart as well as compression    History of Present Illness   Physician Requesting Consult: Husam Moore DO  Reason for Consult / Principal Problem: arm swelling    HPI: Sharon Granda is a 77y o  year old female who presents with flank pain  Vascular surgery consulted for arm swelling on the side of her AVF  Aside from swelling patient denies pain in the arm, denies numbness, denies weakness  Consults    Review of Systems   All other systems reviewed and are negative        Historical Information   Past Medical History:   Diagnosis Date    Anxiety     Bowel obstruction (HCC)     Chronic kidney disease     Chronic kidney disease (CKD), stage IV (severe) (HCC)     stage IV    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    Circulation problem     Compression fracture of cervical spine ([address] )     COVID-19 07/2021    hospitalized     Hernia of abdominal cavity     History of kidney problems     History of transfusion     Hydronephrosis     Hypertension     IBS (irritable bowel syndrome)     Incontinence     Lung mass     Improving on PET/CT 1/2016    Polycythemia vera ([address] )     Pulmonary embolism ([address] ) 2014    Shingles     Urinary tract infection      Past Surgical History:   Procedure Laterality Date    ABDOMINAL ADHESION SURGERY N/A 8/9/2020    Procedure: LYSIS ADHESIONS;  Surgeon: Garrett Mathur DO;  [facility];  Service: General    BLADDER SUSPENSION      BOTOX INJECTION N/A 7/27/2016    Procedure: BOTOX INJECTION ;  Surgeon: Audrey Lomeli MD;  [facility];  Service:    Celeste Rios N/A     COLONOSCOPY      CYSTECTOMY, RADICAL WITH ILEOCONDUIT N/A 10/4/2016    Procedure: SUPRATRIGONAL CYSTECTOMY WITH ILEAL CONDUIT ;  Surgeon: Don Simon MD;  [facility];  Service:    [facility] CYSTOSCOPY W/ RETROGRADES Bilateral 7/27/2016    Procedure: CYSTOSCOPY; RETROGRADE PYELOGRAM ;  Surgeon: Don Simon MD;  [facility];  Service:     HERNIA REPAIR      IR AV FISTULAGRAM/GRAFTOGRAM  2/10/2021    IR NEPHROSTOMY TO NEPHROURETERAL STENT  5/15/2021    IR NEPHROSTOMY TO NEPHROURETERAL STENT  6/9/2021    IR NEPHROSTOMY TUBE CHECK AND/OR REMOVAL  6/16/2021    IR NEPHROSTOMY TUBE CHECK/CHANGE/REPOSITION/REINSERTION/UPSIZE  5/14/2021    IR NEPHROSTOMY TUBE CHECK/CHANGE/REPOSITION/REINSERTION/UPSIZE  5/21/2021    IR NEPHROSTOMY TUBE CHECK/CHANGE/REPOSITION/REINSERTION/UPSIZE  9/16/2021    IR NEPHROSTOMY TUBE CHECK/CHANGE/REPOSITION/REINSERTION/UPSIZE  10/4/2021    IR NEPHROSTOMY TUBE PLACEMENT  5/10/2021    IR NEPHROSTOMY TUBE PLACEMENT  9/20/2021    IR NON-TUNNELED CENTRAL LINE PLACEMENT  7/17/2020    IR NON-TUNNELED CENTRAL LINE PLACEMENT  8/14/2020    IR NON-TUNNELED CENTRAL LINE PLACEMENT  7/16/2021    LAPAROTOMY N/A 8/9/2020    Procedure: LAPAROTOMY EXPLORATORY, PARASTOMAL HERNIA REPAIR WITH MESH;  Surgeon: Zeferino Betancur DO;  [facility];  Service: General    DC ANASTOMOSIS,AV,ANY SITE Right 1/5/2021    Procedure: Creation of right brachiobasilic fistula; Surgeon: Tiago Chairez MD;  [facility];  Service: Vascular    DC COLONOSCOPY FLX DX W/COLLJ SPEC WHEN PFRMD N/A 8/31/2016    Procedure: COLONOSCOPY;  Surgeon: Jaime Armijo MD;  [facility];   Service: Gastroenterology    DC CYSTOSCOPY,INSERT URETERAL STENT Bilateral 7/27/2016    Procedure: STENT INSERTION; EXCISION OF MESH ;  Surgeon: Don Simon MD;  [facility];  Service: Urology    DC REVISE AV FISTULA,W/O THROMBECTOMY Right 9/30/2021    Procedure: Superficialization and transposition of right brachiobasilic fistula; Surgeon: Evy Chairez MD;  [facility];  Service: Vascular    TONSILLECTOMY      TUBAL LIGATION      WISDOM TOOTH EXTRACTION       Social History   Social History     Substance and Sexual Activity   Alcohol Use Not Currently    Comment: n/s     Social History     Substance and Sexual Activity   Drug Use Not Currently    Comment: n/a     E-Cigarette/Vaping    E-Cigarette Use Never User      E-Cigarette/Vaping Substances    Nicotine No     THC No     CBD No     Flavoring No     Other No     Unknown No      Social History     Tobacco Use   Smoking Status Never Smoker   Smokeless Tobacco Never Used   Tobacco Comment    n/a     Family History:   Family History   Problem Relation Age of Onset    Cancer Mother         small cell cancer     Heart disease Father     COPD Father     Heart disease Brother     Nephrolithiasis Brother    }    Meds/Allergies   all current active meds have been reviewed  Allergies   Allergen Reactions    Chlorhexidine Rash     petichi like rash when using chlorhexidine swabs prior to IV  Objective   Vitals: Blood pressure 104/63, pulse 91, temperature 97 7 °F (36 5 °C), resp  rate 20, height 5' (1 524 m), weight 86 5 kg (190 lb 11 2 oz), SpO2 96 %, not currently breastfeeding  ,Body mass index is 37 24 kg/m²  No intake or output data in the 24 hours ending 10/10/21 0656  Invasive Devices     Central Venous Catheter Line            [facility] Central Lines 10/09/21 Triple Left Femoral <1 day          Peripheral Intravenous Line            Peripheral IV Left;Ventral (anterior) Forearm -- days          Line            Hemodialysis AV Fistula 09/30/21 Right Upper arm 10 days          Drain            Urostomy Ileal conduit RUQ 1831 days                Physical Exam  Constitutional:       General: She is not in acute distress  Appearance: She is obese  Cardiovascular:      Rate and Rhythm: Normal rate     Pulmonary: Effort: Pulmonary effort is normal    Abdominal:      Palpations: Abdomen is soft  Musculoskeletal:      Comments: RUE swelling, mostly from fingers to antecubital fossa  Palpable thrill in AVF  Strong doppler signal in right radial and ulnar artery as well as palmar arch   Skin:     General: Skin is warm and dry  Capillary Refill: Capillary refill takes less than 2 seconds  Neurological:      General: No focal deficit present  Mental Status: She is alert and oriented to person, place, and time  Lab Results: I have personally reviewed pertinent reports  Imaging Studies: I have personally reviewed pertinent reports  EKG, Pathology, and Other Studies: I have personally reviewed pertinent reports      VTE Prophylaxis: Fondaparinux (Arixtra)     Code Status: Level 1 - Full Code  Advance Directive and Living Will:      Power of :    POLST:      Counseling / Coordination of Care  None

## 2021-10-10 NOTE — ASSESSMENT & PLAN NOTE
+SIRS, unknown source  -Presented with temp 102 4, tachycardia, tachypnea and elevated pro calcitonin possibly suggesting urinary source of infection      -ID is on board  -Vanco discontinued by ID continue with ceftriaxone    -Blood culture growing G- rods  -F/U urine cx

## 2021-10-11 ENCOUNTER — APPOINTMENT (INPATIENT)
Dept: RADIOLOGY | Facility: HOSPITAL | Age: 76
DRG: 981 | End: 2021-10-11
Attending: STUDENT IN AN ORGANIZED HEALTH CARE EDUCATION/TRAINING PROGRAM
Payer: COMMERCIAL

## 2021-10-11 ENCOUNTER — APPOINTMENT (INPATIENT)
Dept: RADIOLOGY | Facility: HOSPITAL | Age: 76
DRG: 981 | End: 2021-10-11
Payer: COMMERCIAL

## 2021-10-11 LAB
ABO GROUP BLD: NORMAL
ANION GAP SERPL CALCULATED.3IONS-SCNC: 8 MMOL/L (ref 4–13)
ANISOCYTOSIS BLD QL SMEAR: PRESENT
APTT PPP: 46 SECONDS (ref 23–37)
ARTIFACT: PRESENT
BASOPHILS # BLD MANUAL: 0 THOUSAND/UL (ref 0–0.1)
BASOPHILS NFR MAR MANUAL: 0 % (ref 0–1)
BLD GP AB SCN SERPL QL: NEGATIVE
BUN SERPL-MCNC: 51 MG/DL (ref 5–25)
C DIFF TOX GENS STL QL NAA+PROBE: NEGATIVE
CALCIUM SERPL-MCNC: 7.9 MG/DL (ref 8.3–10.1)
CHLORIDE SERPL-SCNC: 113 MMOL/L (ref 100–108)
CO2 SERPL-SCNC: 20 MMOL/L (ref 21–32)
CREAT SERPL-MCNC: 3.55 MG/DL (ref 0.6–1.3)
EOSINOPHIL # BLD MANUAL: 0 THOUSAND/UL (ref 0–0.4)
EOSINOPHIL NFR BLD MANUAL: 0 % (ref 0–6)
ERYTHROCYTE [DISTWIDTH] IN BLOOD BY AUTOMATED COUNT: 16.9 % (ref 11.6–15.1)
GFR SERPL CREATININE-BSD FRML MDRD: 12 ML/MIN/1.73SQ M
GLUCOSE FLD-MCNC: 80 MG/DL
GLUCOSE SERPL-MCNC: 93 MG/DL (ref 65–140)
HCT VFR BLD AUTO: 21.5 % (ref 34.8–46.1)
HCT VFR BLD AUTO: 22 % (ref 34.8–46.1)
HCT VFR BLD AUTO: 27.1 % (ref 34.8–46.1)
HCT VFR BLD AUTO: 29 % (ref 34.8–46.1)
HGB BLD-MCNC: 6.7 G/DL (ref 11.5–15.4)
HGB BLD-MCNC: 6.9 G/DL (ref 11.5–15.4)
HGB BLD-MCNC: 8.5 G/DL (ref 11.5–15.4)
HGB BLD-MCNC: 9.2 G/DL (ref 11.5–15.4)
HISTIOCYTES NFR FLD: 5 %
INR PPP: 2 (ref 0.84–1.19)
LDH FLD L TO P-CCNC: 496 U/L
LDH SERPL-CCNC: 295 U/L (ref 81–234)
LYMPHOCYTES # BLD AUTO: 0.29 THOUSAND/UL (ref 0.6–4.47)
LYMPHOCYTES # BLD AUTO: 6 % (ref 14–44)
LYMPHOCYTES NFR BLD AUTO: 18 %
MAGNESIUM SERPL-MCNC: 1.7 MG/DL (ref 1.6–2.6)
MCH RBC QN AUTO: 28.9 PG (ref 26.8–34.3)
MCHC RBC AUTO-ENTMCNC: 31.4 G/DL (ref 31.4–37.4)
MCV RBC AUTO: 92 FL (ref 82–98)
METAMYELOCYTES NFR BLD MANUAL: 3 % (ref 0–1)
MONOCYTES # BLD AUTO: 0.15 THOUSAND/UL (ref 0–1.22)
MONOCYTES NFR BLD AUTO: 10 %
MONOCYTES NFR BLD: 3 % (ref 4–12)
NEUTROPHILS # BLD MANUAL: 4.27 THOUSAND/UL (ref 1.85–7.62)
NEUTS BAND NFR BLD MANUAL: 4 % (ref 0–8)
NEUTS SEG NFR BLD AUTO: 67 %
NEUTS SEG NFR BLD AUTO: 84 % (ref 43–75)
OVALOCYTES BLD QL SMEAR: PRESENT
PH BODY FLUID: 7.3
PHOSPHATE SERPL-MCNC: 5 MG/DL (ref 2.3–4.1)
PLATELET # BLD AUTO: 212 THOUSANDS/UL (ref 149–390)
PLATELET BLD QL SMEAR: ADEQUATE
PMV BLD AUTO: 10.2 FL (ref 8.9–12.7)
POIKILOCYTOSIS BLD QL SMEAR: PRESENT
POTASSIUM SERPL-SCNC: 3 MMOL/L (ref 3.5–5.3)
PROT FLD-MCNC: 2.4 G/DL
PROTHROMBIN TIME: 21.7 SECONDS (ref 11.6–14.5)
RBC # BLD AUTO: 2.39 MILLION/UL (ref 3.81–5.12)
RBC MORPH BLD: PRESENT
RH BLD: POSITIVE
SODIUM SERPL-SCNC: 141 MMOL/L (ref 136–145)
SPECIMEN EXPIRATION DATE: NORMAL
TOTAL CELLS COUNTED SPEC: 100
WBC # BLD AUTO: 4.85 THOUSAND/UL (ref 4.31–10.16)
WBC # FLD MANUAL: NORMAL /UL

## 2021-10-11 PROCEDURE — 88305 TISSUE EXAM BY PATHOLOGIST: CPT | Performed by: PATHOLOGY

## 2021-10-11 PROCEDURE — 32555 ASPIRATE PLEURA W/ IMAGING: CPT | Performed by: RADIOLOGY

## 2021-10-11 PROCEDURE — NC001 PR NO CHARGE: Performed by: RADIOLOGY

## 2021-10-11 PROCEDURE — 87493 C DIFF AMPLIFIED PROBE: CPT | Performed by: STUDENT IN AN ORGANIZED HEALTH CARE EDUCATION/TRAINING PROGRAM

## 2021-10-11 PROCEDURE — 76937 US GUIDE VASCULAR ACCESS: CPT

## 2021-10-11 PROCEDURE — 87070 CULTURE OTHR SPECIMN AEROBIC: CPT | Performed by: STUDENT IN AN ORGANIZED HEALTH CARE EDUCATION/TRAINING PROGRAM

## 2021-10-11 PROCEDURE — 88112 CYTOPATH CELL ENHANCE TECH: CPT | Performed by: PATHOLOGY

## 2021-10-11 PROCEDURE — 85730 THROMBOPLASTIN TIME PARTIAL: CPT | Performed by: PHYSICIAN ASSISTANT

## 2021-10-11 PROCEDURE — 86901 BLOOD TYPING SEROLOGIC RH(D): CPT | Performed by: PHYSICIAN ASSISTANT

## 2021-10-11 PROCEDURE — 86850 RBC ANTIBODY SCREEN: CPT | Performed by: PHYSICIAN ASSISTANT

## 2021-10-11 PROCEDURE — 87205 SMEAR GRAM STAIN: CPT | Performed by: STUDENT IN AN ORGANIZED HEALTH CARE EDUCATION/TRAINING PROGRAM

## 2021-10-11 PROCEDURE — 99222 1ST HOSP IP/OBS MODERATE 55: CPT | Performed by: INTERNAL MEDICINE

## 2021-10-11 PROCEDURE — 80048 BASIC METABOLIC PNL TOTAL CA: CPT | Performed by: PHYSICIAN ASSISTANT

## 2021-10-11 PROCEDURE — 32555 ASPIRATE PLEURA W/ IMAGING: CPT

## 2021-10-11 PROCEDURE — 85014 HEMATOCRIT: CPT | Performed by: STUDENT IN AN ORGANIZED HEALTH CARE EDUCATION/TRAINING PROGRAM

## 2021-10-11 PROCEDURE — 85027 COMPLETE CBC AUTOMATED: CPT | Performed by: PHYSICIAN ASSISTANT

## 2021-10-11 PROCEDURE — 97163 PT EVAL HIGH COMPLEX 45 MIN: CPT

## 2021-10-11 PROCEDURE — C1751 CATH, INF, PER/CENT/MIDLINE: HCPCS

## 2021-10-11 PROCEDURE — 99232 SBSQ HOSP IP/OBS MODERATE 35: CPT | Performed by: INTERNAL MEDICINE

## 2021-10-11 PROCEDURE — P9016 RBC LEUKOCYTES REDUCED: HCPCS

## 2021-10-11 PROCEDURE — 85007 BL SMEAR W/DIFF WBC COUNT: CPT | Performed by: PHYSICIAN ASSISTANT

## 2021-10-11 PROCEDURE — 83615 LACTATE (LD) (LDH) ENZYME: CPT | Performed by: STUDENT IN AN ORGANIZED HEALTH CARE EDUCATION/TRAINING PROGRAM

## 2021-10-11 PROCEDURE — 36556 INSERT NON-TUNNEL CV CATH: CPT | Performed by: RADIOLOGY

## 2021-10-11 PROCEDURE — 77001 FLUOROGUIDE FOR VEIN DEVICE: CPT | Performed by: RADIOLOGY

## 2021-10-11 PROCEDURE — 85610 PROTHROMBIN TIME: CPT | Performed by: PHYSICIAN ASSISTANT

## 2021-10-11 PROCEDURE — 99232 SBSQ HOSP IP/OBS MODERATE 35: CPT | Performed by: STUDENT IN AN ORGANIZED HEALTH CARE EDUCATION/TRAINING PROGRAM

## 2021-10-11 PROCEDURE — 84100 ASSAY OF PHOSPHORUS: CPT | Performed by: STUDENT IN AN ORGANIZED HEALTH CARE EDUCATION/TRAINING PROGRAM

## 2021-10-11 PROCEDURE — 77001 FLUOROGUIDE FOR VEIN DEVICE: CPT

## 2021-10-11 PROCEDURE — 36556 INSERT NON-TUNNEL CV CATH: CPT

## 2021-10-11 PROCEDURE — 97167 OT EVAL HIGH COMPLEX 60 MIN: CPT

## 2021-10-11 PROCEDURE — 99233 SBSQ HOSP IP/OBS HIGH 50: CPT | Performed by: INTERNAL MEDICINE

## 2021-10-11 PROCEDURE — 0W9B3ZZ DRAINAGE OF LEFT PLEURAL CAVITY, PERCUTANEOUS APPROACH: ICD-10-PCS | Performed by: RADIOLOGY

## 2021-10-11 PROCEDURE — 83735 ASSAY OF MAGNESIUM: CPT | Performed by: STUDENT IN AN ORGANIZED HEALTH CARE EDUCATION/TRAINING PROGRAM

## 2021-10-11 PROCEDURE — 99232 SBSQ HOSP IP/OBS MODERATE 35: CPT | Performed by: UROLOGY

## 2021-10-11 PROCEDURE — 76937 US GUIDE VASCULAR ACCESS: CPT | Performed by: RADIOLOGY

## 2021-10-11 PROCEDURE — 02H633Z INSERTION OF INFUSION DEVICE INTO RIGHT ATRIUM, PERCUTANEOUS APPROACH: ICD-10-PCS | Performed by: RADIOLOGY

## 2021-10-11 PROCEDURE — 93990 DOPPLER FLOW TESTING: CPT | Performed by: SURGERY

## 2021-10-11 PROCEDURE — 85018 HEMOGLOBIN: CPT | Performed by: STUDENT IN AN ORGANIZED HEALTH CARE EDUCATION/TRAINING PROGRAM

## 2021-10-11 PROCEDURE — 83986 ASSAY PH BODY FLUID NOS: CPT | Performed by: STUDENT IN AN ORGANIZED HEALTH CARE EDUCATION/TRAINING PROGRAM

## 2021-10-11 PROCEDURE — 84157 ASSAY OF PROTEIN OTHER: CPT | Performed by: STUDENT IN AN ORGANIZED HEALTH CARE EDUCATION/TRAINING PROGRAM

## 2021-10-11 PROCEDURE — 82945 GLUCOSE OTHER FLUID: CPT | Performed by: STUDENT IN AN ORGANIZED HEALTH CARE EDUCATION/TRAINING PROGRAM

## 2021-10-11 PROCEDURE — NC001 PR NO CHARGE: Performed by: INTERNAL MEDICINE

## 2021-10-11 PROCEDURE — 86900 BLOOD TYPING SEROLOGIC ABO: CPT | Performed by: PHYSICIAN ASSISTANT

## 2021-10-11 PROCEDURE — 86923 COMPATIBILITY TEST ELECTRIC: CPT

## 2021-10-11 PROCEDURE — 89051 BODY FLUID CELL COUNT: CPT | Performed by: STUDENT IN AN ORGANIZED HEALTH CARE EDUCATION/TRAINING PROGRAM

## 2021-10-11 RX ORDER — METRONIDAZOLE 500 MG/1
500 TABLET ORAL EVERY 8 HOURS SCHEDULED
Status: DISCONTINUED | OUTPATIENT
Start: 2021-10-11 | End: 2021-10-14

## 2021-10-11 RX ORDER — MAGNESIUM SULFATE HEPTAHYDRATE 40 MG/ML
2 INJECTION, SOLUTION INTRAVENOUS ONCE
Status: COMPLETED | OUTPATIENT
Start: 2021-10-11 | End: 2021-10-12

## 2021-10-11 RX ORDER — POTASSIUM CHLORIDE 20MEQ/15ML
40 LIQUID (ML) ORAL ONCE
Status: COMPLETED | OUTPATIENT
Start: 2021-10-11 | End: 2021-10-11

## 2021-10-11 RX ORDER — LIDOCAINE WITH 8.4% SOD BICARB 0.9%(10ML)
SYRINGE (ML) INJECTION CODE/TRAUMA/SEDATION MEDICATION
Status: COMPLETED | OUTPATIENT
Start: 2021-10-11 | End: 2021-10-11

## 2021-10-11 RX ORDER — POTASSIUM CHLORIDE 14.9 MG/ML
20 INJECTION INTRAVENOUS ONCE
Status: COMPLETED | OUTPATIENT
Start: 2021-10-11 | End: 2021-10-12

## 2021-10-11 RX ADMIN — METRONIDAZOLE 500 MG: 500 TABLET ORAL at 15:40

## 2021-10-11 RX ADMIN — Medication 10 ML: at 19:16

## 2021-10-11 RX ADMIN — METRONIDAZOLE 500 MG: 500 TABLET ORAL at 21:32

## 2021-10-11 RX ADMIN — CEFEPIME HYDROCHLORIDE 1000 MG: 1 INJECTION, POWDER, FOR SOLUTION INTRAMUSCULAR; INTRAVENOUS at 21:32

## 2021-10-11 RX ADMIN — SODIUM BICARBONATE 650 MG TABLET 650 MG: at 11:28

## 2021-10-11 RX ADMIN — ATORVASTATIN CALCIUM 40 MG: 40 TABLET, FILM COATED ORAL at 21:32

## 2021-10-11 RX ADMIN — MAGNESIUM SULFATE HEPTAHYDRATE 2 G: 40 INJECTION, SOLUTION INTRAVENOUS at 11:22

## 2021-10-11 RX ADMIN — Medication 250 MG: at 11:27

## 2021-10-11 RX ADMIN — CEFEPIME HYDROCHLORIDE 1000 MG: 1 INJECTION, POWDER, FOR SOLUTION INTRAMUSCULAR; INTRAVENOUS at 11:21

## 2021-10-11 RX ADMIN — POTASSIUM CHLORIDE 40 MEQ: 20 SOLUTION ORAL at 11:27

## 2021-10-11 RX ADMIN — POTASSIUM CHLORIDE 20 MEQ: 14.9 INJECTION, SOLUTION INTRAVENOUS at 07:20

## 2021-10-11 RX ADMIN — LEVOTHYROXINE SODIUM 75 MCG: 75 TABLET ORAL at 11:28

## 2021-10-11 RX ADMIN — CALCITRIOL 0.25 MCG: 0.25 CAPSULE, LIQUID FILLED ORAL at 11:27

## 2021-10-11 RX ADMIN — SODIUM BICARBONATE 650 MG TABLET 650 MG: at 17:38

## 2021-10-11 RX ADMIN — METOPROLOL TARTRATE 25 MG: 25 TABLET, FILM COATED ORAL at 11:27

## 2021-10-11 RX ADMIN — CITALOPRAM HYDROBROMIDE 20 MG: 20 TABLET ORAL at 11:27

## 2021-10-11 RX ADMIN — MELATONIN TAB 3 MG 3 MG: 3 TAB at 21:33

## 2021-10-11 RX ADMIN — ACETAMINOPHEN 975 MG: 325 TABLET, FILM COATED ORAL at 07:21

## 2021-10-11 RX ADMIN — ACETAMINOPHEN 650 MG: 325 TABLET, FILM COATED ORAL at 17:39

## 2021-10-11 RX ADMIN — ACETAMINOPHEN 975 MG: 325 TABLET, FILM COATED ORAL at 14:26

## 2021-10-11 NOTE — ASSESSMENT & PLAN NOTE
Lab Results   Component Value Date    EGFR 12 10/11/2021    EGFR 12 10/10/2021    EGFR 13 10/09/2021    CREATININE 3 55 (H) 10/11/2021    CREATININE 3 60 (H) 10/10/2021    CREATININE 3 26 (H) 10/09/2021   Stage 5 chronic kidney disease not on chronic dialysis  Patient has progressive CKD probably secondary to obstructive nephropathy  Avoid nephrotoxic medication  Daily BMP  Nephrology consult, appreciate input  Will hold lasix today

## 2021-10-11 NOTE — CONSULTS
Medical Oncology/Hematology Consult Note  Wilfredo Sawant, female, 77 y o , 1945,  PPHP 929/PPHP [id number], [id number]     Assessment and Plan  1  Normocytic anemia  Received Feraheme on 9/29 and aranesp on 10/7 for known anemia  Likely anemia of chronic disease from CKD5 per recent iron studies  Baseline hgb 7 5-9 5  Had hgb of 9 3 on admit, which decreased to 7 1 on 10/10 although this decrease appears to be dilutional in the setting of fluids/rehydration  Hematoma in renal fossa likely contributing to hgb slightly decreased from baseline  Patient is asymptomatic from anemia at this time  · Will obtain haptoglobin/LDH for concern for hemolysis with slightly elevated T  britt in the setting of hematoma  · Continue to monitor hgb, transfuse pRBCs for hgb <7 or symptomatic  · Re-imaging with U/S in 2-3 days if hgb remains stable after transfusion today to ensure hematoma is not expanding  Image sooner if poorly responsive to transfusion or further signs of bleeding  · Recommend arrange diagnostic colonoscopy as outpatient although low suspicion for GI loss at this time  Does not remember when she has had a colonoscopy in the past    · Continue outpatient f/u with Dr Mallory Escalona  2  Polycythemia vera  Follows with Mallory Patton for JAK2+ PCV diagnosed in 2009  Previously on Jakafi although this has been discontinued since last admission d/t anemia  · Continue to hold [facility]   · Continue outpatient f/u with Dr Mallory Escalona    3  Hx of multiple DVT/PE  Follows with Dr Mallory Escalona, on eliquis 2 5mg BID  S/p IVC filter removal in 2016  Patient has significant risk of DVT/PE as has been immobilized, recent surgery, obesity, and malignant history with PCV  · Eliquis is on hold per primary  · INR 2 00, PTT 46 on admission   · Can consider resuming eliquis if hemoglobin remains stable  · Re-imaging with U/S in 2-3 days if hgb remains stable after transfusion today to ensure hematoma is not expanding   Image sooner if poorly responsive to transfusion or further signs of bleeding  4  Renal hematoma (L)  Patient has significant urologic history s/p cystectomy for chronic hydronephrosis and nonfunctional bladder  Hospitalized 5/8-5/16 d/t gross hematuria at which point was found to have new L hydronephrosis after percutaneous nephrostomy tube  Presented with new abdominal pain 10/9 and diarrhea  CT CAP found new hematoma  · Has f/u with nephrology outpatient  · Re-imaging with U/S in 2-3 days if hgb remains stable after transfusion today to ensure hematoma is not expanding  Image sooner if poorly responsive to transfusion or further signs of bleeding  Outpatient follow up plan: continue f/u with Dr Breanna Reina outpatient      I did review this patient with Dr Suzette Woods who is in agreement with this plan  Reason for consultation: anemia, suspected blood loss  Hx PCV not on Jakafi d/t anemia, on aranesp q28d for hgb<9 9    History of present illness:   Caprice Flores is a 76yo F with PMHx of 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, CKD4/5 not requiring dialysis, obstructive uropathy with hydronephrosis s/p stent placement, s/p cystectomy with ostomy placement, HTN, Polycythemia vera not on jakafi d/t more recent anemia  She is here for abdominal pain and diarrhea  Was found to meet sepsis criteria with gram neg anaerobic bacteremia and hematoma in the L renal fossa  Was initially on ceftriaxone/vanco, but Abx switched to cefepime 10/11 for better coverage given BCx  She had a recent admission 9/30 after elective vascular surgery (superficialization/transposition of R brachial basilic fistula) d/t anemia  She follows with Dr Breanna Reina outpatient for PCV, DVT/PE, and anemia  Has baseline hgb 7 5-9 since August of 2020  Normocytic, iron panel suggests anemia of chronic disease  Worsening CKD known to play a role  Receives IV Iron (last on 9/29/21) and aranesp(last 10/7/21) monthly for her anemia   Does not report any signs of bleeding although CT CAP on admission noted "some hemorrhage and thickening extending in the combined interfascial plane of the retroperitoneum as well as some high density fluid within the pelvis suggesting hemoperitoneum " and patient had noted moderate blood on UA at admission  Does not recall her last colonoscopy  Denies vaginal bleeding or bleeding from anywhere else although she does not pay attention to these things  She feels somewhat weak since her surgery, however does not offer any new complaints suggestives of symptomatic anemia  ROS below  Review of Systems:    Review of Systems   Constitutional: Negative for chills and fever  HENT:        Notes post-nasal drip since being in the hospital which she attributes to the nasal cannula    Respiratory: Negative for shortness of breath  Cardiovascular: Negative for chest pain and palpitations  Gastrointestinal: Positive for abdominal pain and diarrhea  Negative for blood in stool, constipation and vomiting  Left mid-abdomen pain   Genitourinary: Negative for vaginal bleeding  Denies blood in urine ostomy bag  Neurological: Negative for numbness  Does feel generally weak although not new, since recent admission  Does not note dizziness or lightheadedness although has been lying flat since her last admission even while she was at home  Hematological: Bruises/bleeds easily  Psychiatric/Behavioral: Negative for confusion         Past Medical History:   Diagnosis Date    Anxiety     Bowel obstruction (HCC)     Chronic kidney disease     Chronic kidney disease (CKD), stage IV (severe) (HCC)     stage IV    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    Circulation problem     Compression fracture of cervical spine ([facility] [address] )     COVID-19 07/2021    hospitalized     Hernia of abdominal cavity     History of kidney problems     History of transfusion     Hydronephrosis     Hypertension     IBS (irritable bowel syndrome)     Incontinence     Lung mass     Improving on PET/CT 1/2016    Polycythemia vera ([facility] [address] [facility]     Pulmonary embolism ([facility] [address] [facility] 2014    Shingles     Urinary tract infection        Past Surgical History:   Procedure Laterality Date    ABDOMINAL ADHESION SURGERY N/A 8/9/2020    Procedure: LYSIS ADHESIONS;  Surgeon: Jeffrey Grant DO;  [facility];  Service: General    BLADDER SUSPENSION      BOTOX INJECTION N/A 7/27/2016    Procedure: BOTOX INJECTION ;  Surgeon: Husam Kruse MD;  [facility];  Service:     CHOLECYSTECTOMY N/A     COLONOSCOPY      CYSTECTOMY, RADICAL WITH ILEOCONDUIT N/A 10/4/2016    Procedure: Wilton Saran WITH ILEAL CONDUIT ;  Surgeon: Husam Kruse MD;  [facility];  Service:    Delmi Foots W/ RETROGRADES Bilateral 7/27/2016    Procedure: CYSTOSCOPY; RETROGRADE PYELOGRAM ;  Surgeon: Husam Kruse MD;  [facility];  Service:     HERNIA REPAIR      IR AV FISTULAGRAM/GRAFTOGRAM  2/10/2021    IR NEPHROSTOMY TO NEPHROURETERAL STENT  5/15/2021    IR NEPHROSTOMY TO NEPHROURETERAL STENT  6/9/2021    IR NEPHROSTOMY TUBE CHECK AND/OR REMOVAL  6/16/2021    IR NEPHROSTOMY TUBE CHECK/CHANGE/REPOSITION/REINSERTION/UPSIZE  5/14/2021    IR NEPHROSTOMY TUBE CHECK/CHANGE/REPOSITION/REINSERTION/UPSIZE  5/21/2021    IR NEPHROSTOMY TUBE CHECK/CHANGE/REPOSITION/REINSERTION/UPSIZE  9/16/2021    IR NEPHROSTOMY TUBE CHECK/CHANGE/REPOSITION/REINSERTION/UPSIZE  10/4/2021    IR NEPHROSTOMY TUBE PLACEMENT  5/10/2021    IR NEPHROSTOMY TUBE PLACEMENT  9/20/2021    IR NON-TUNNELED CENTRAL LINE PLACEMENT  7/17/2020    IR NON-TUNNELED CENTRAL LINE PLACEMENT  8/14/2020    IR NON-TUNNELED CENTRAL LINE PLACEMENT  7/16/2021    LAPAROTOMY N/A 8/9/2020    Procedure: LAPAROTOMY EXPLORATORY, PARASTOMAL HERNIA REPAIR WITH MESH;  Surgeon: Jeffrey Grant DO;  [facility];  Service: General    HI ANASTOMOSIS,AV,ANY SITE Right 1/5/2021 Procedure: Creation of right brachiobasilic fistula; Surgeon: Yamileth Chairez MD;  [facility];  Service: Vascular    NH COLONOSCOPY FLX DX W/COLLJ SPEC WHEN PFRMD N/A 8/31/2016    Procedure: COLONOSCOPY;  Surgeon: Nellie Parrish MD;  Location: [facility]; Service: Gastroenterology    NH CYSTOSCOPY,INSERT URETERAL STENT Bilateral 7/27/2016    Procedure: STENT INSERTION; EXCISION OF MESH ;  Surgeon: Gunner Cody MD;  [facility];  Service: Urology    NH REVISE AV FISTULA,W/O THROMBECTOMY Right 9/30/2021    Procedure: Superficialization and transposition of right brachiobasilic fistula; Surgeon: Evy Chairez MD;  [facility];  Service: Vascular    TONSILLECTOMY      TUBAL LIGATION      WISDOM TOOTH EXTRACTION         Family History   Problem Relation Age of Onset    Cancer Mother         small cell cancer     Heart disease Father     COPD Father     Heart disease Brother     Nephrolithiasis Brother        Social History     Socioeconomic History    Marital status:      Spouse name: None    Number of children: None    Years of education: None    Highest education level: None   Occupational History    None   Tobacco Use    Smoking status: Never Smoker    Smokeless tobacco: Never Used    Tobacco comment: n/a   Vaping Use    Vaping Use: Never used   Substance and Sexual Activity    Alcohol use: Not Currently     Comment: n/s    Drug use: Not Currently     Comment: n/a    Sexual activity: Not Currently     Partners: Male   Other Topics Concern    None   Social History Narrative    None     Social Determinants of Health     Financial Resource Strain:     Difficulty of Paying Living Expenses:    Food Insecurity:     Worried About Running Out of Food in the Last Year:     Ran Out of Food in the Last Year:    Transportation Needs:     Lack of Transportation (Medical):      Lack of Transportation (Non-Medical):    Physical Activity:     Days of Exercise per Week:     Minutes of Exercise per Session:    Stress:     Feeling of Stress :    Social Connections:     Frequency of Communication with Friends and Family:     Frequency of Social Gatherings with Friends and Family:     Attends Hindu Services:     Active Member of Clubs or Organizations:     Attends Club or Organization Meetings:     Marital Status:    Intimate Partner Violence:     Fear of Current or Ex-Partner:     Emotionally Abused:     Physically Abused:     Sexually Abused:          Current Facility-Administered Medications:     acetaminophen (TYLENOL) tablet 650 mg, 650 mg, Oral, Q6H PRN, Elizabeth Kwok MD    acetaminophen (TYLENOL) tablet 975 mg, 975 mg, Oral, Q8H [facility], Elizabeth Kwok MD, 975 mg at 10/11/21 0721    aluminum-magnesium hydroxide-simethicone (MYLANTA) oral suspension 30 mL, 30 mL, Oral, Q6H PRN, Elizabeth Kwok MD    atorvastatin (LIPITOR) tablet 40 mg, 40 mg, Oral, HS, Elizabeth Kwok MD, 40 mg at 10/10/21 2233    calcitriol (ROCALTROL) capsule 0 25 mcg, 0 25 mcg, Oral, Daily, Elizabeth Kwok MD, 0 25 mcg at 10/11/21 1127    cefepime (MAXIPIME) 1,000 mg in dextrose 5 % 50 mL IVPB, 1,000 mg, Intravenous, Q12H, Brennen Castaneda MD, Last Rate: 100 mL/hr at 10/11/21 1121, 1,000 mg at 10/11/21 1121    cholecalciferol (VITAMIN D) oral liquid 800 Units, 800 Units, Oral, Daily, Elizabeth Kwok MD, 800 Units at 10/11/21 1127    citalopram (CeleXA) tablet 20 mg, 20 mg, Oral, Daily, Elizabeth Kwok MD, 20 mg at 10/11/21 1127    docusate sodium (COLACE) capsule 100 mg, 100 mg, Oral, BID, Elizabeth Kwok MD, 100 mg at 10/09/21 2232    fentanyl citrate (PF) 100 MCG/2ML 25 mcg, 25 mcg, Intravenous, Q2H PRN, Elizabeth Kwok MD    levothyroxine tablet 75 mcg, 75 mcg, Oral, Daily, Elizabeth Kwok MD, 75 mcg at 10/11/21 1128    melatonin tablet 3 mg, 3 mg, Oral, HS, Elizabeht Kwok MD, 3 mg at 10/10/21 2233    metoprolol tartrate (LOPRESSOR) tablet 25 mg, 25 mg, Oral, BID, Colten Blanca MD, 25 mg at 10/11/21 1127    ondansetron (ZOFRAN) injection 4 mg, 4 mg, Intravenous, Q6H PRN, Colten Blanca MD    saccharomyces boulardii (FLORASTOR) capsule 250 mg, 250 mg, Oral, Daily, Colten Blanca MD, 250 mg at 10/11/21 1127    senna (SENOKOT) tablet 8 6 mg, 1 tablet, Oral, Daily, Colten Blanca MD    sodium bicarbonate tablet 650 mg, 650 mg, Oral, BID after meals, Colten Blanca MD, 650 mg at 10/11/21 1128    Medications Prior to Admission   Medication    acetaminophen (TYLENOL) 325 mg tablet    apixaban (Eliquis) 2 5 mg    atorvastatin (LIPITOR) 40 mg tablet    calcitriol (ROCALTROL) 0 25 mcg capsule    Cholecalciferol (VITAMIN D3) 1000 UNITS CAPS    citalopram (CeleXA) 20 mg tablet    levothyroxine 75 mcg tablet    loperamide (IMODIUM) 2 mg capsule    melatonin 3 mg    metoprolol tartrate (LOPRESSOR) 25 mg tablet    saccharomyces boulardii (Florastor) 250 mg capsule    sodium bicarbonate 650 mg tablet       Allergies   Allergen Reactions    Chlorhexidine Rash     petichi like rash when using chlorhexidine swabs prior to IV  Physical Exam:     /60   Pulse 92   Temp 98 °F (36 7 °C)   Resp 17   Ht 5' (1 524 m)   Wt 86 5 kg (190 lb 11 2 oz)   SpO2 96%   BMI 37 24 kg/m²     Physical Exam  Vitals and nursing note reviewed  Constitutional:       General: She is not in acute distress  Appearance: She is obese  She is not diaphoretic  HENT:      Head: Normocephalic and atraumatic  Mouth/Throat:      Mouth: Mucous membranes are dry  Eyes:      General: No scleral icterus  Extraocular Movements: Extraocular movements intact  Cardiovascular:      Rate and Rhythm: Regular rhythm  Tachycardia present  Heart sounds: Normal heart sounds  Comments: Pulses 1+ symmetrically radially, DP/PT  Pulmonary:      Comments: Breath sounds diminished   Exam limited by patient habitus, inability to sit up, and limited inspiratory effort  In not acute distress on 3L NC  Abdominal:      General: Bowel sounds are normal       Palpations: Abdomen is soft  Tenderness: There is no rebound  Comments: Tender to palpation focally at left mid-abdomen  No other tenderness to palpation   Musculoskeletal:      Right lower leg: No edema  Left lower leg: No edema  Skin:     General: Skin is dry  Capillary Refill: Capillary refill takes less than 2 seconds  Findings: Bruising present  Neurological:      General: No focal deficit present  Mental Status: She is alert and oriented to person, place, and time         Obdulio Hernandez MS4    Recent Results (from the past 50 hour(s))   Blood culture #1    Collection Time: 10/09/21  2:44 PM    Specimen: Blood   Result Value Ref Range    Blood Culture Gram-Negative Rods Anaerobic (Group) (A)     Gram Stain Result Gram negative rods (A)        Susceptibility    Gram-Negative Rods Anaerobic (Group) -  (no method available)     Beta Lactamase Negative     ECG 12 lead    Collection Time: 10/09/21  2:46 PM   Result Value Ref Range    Ventricular Rate 114 BPM    Atrial Rate 114 BPM    KY Interval 116 ms    QRSD Interval 82 ms    QT Interval 346 ms    QTC Interval 476 ms    P [place] 44 degrees    QRS Axis 29 degrees    T Wave Axis 16 degrees   CBC and differential    Collection Time: 10/09/21  2:47 PM   Result Value Ref Range    WBC 6 15 4 31 - 10 16 Thousand/uL    RBC 3 21 (L) 3 81 - 5 12 Million/uL    Hemoglobin 9 3 (L) 11 5 - 15 4 g/dL    Hematocrit 29 8 (L) 34 8 - 46 1 %    MCV 93 82 - 98 fL    MCH 29 0 26 8 - 34 3 pg    MCHC 31 2 (L) 31 4 - 37 4 g/dL    RDW 16 4 (H) 11 6 - 15 1 %    MPV 10 1 8 9 - 12 7 fL    Platelets 668 578 - 909 Thousands/uL   Comprehensive metabolic panel    Collection Time: 10/09/21  2:47 PM   Result Value Ref Range    Sodium 138 136 - 145 mmol/L    Potassium 3 2 (L) 3 5 - 5 3 mmol/L    Chloride 110 (H) 100 - 108 mmol/L    CO2 16 (L) 21 - 32 mmol/L    ANION GAP 12 4 - 13 mmol/L    BUN 37 (H) 5 - 25 mg/dL    Creatinine 3 26 (H) 0 60 - 1 30 mg/dL    Glucose 100 65 - 140 mg/dL    Calcium 9 3 8 3 - 10 1 mg/dL    Corrected Calcium 10 8 (H) 8 3 - 10 1 mg/dL    AST 63 (H) 5 - 45 U/L    ALT 11 (L) 12 - 78 U/L    Alkaline Phosphatase 128 (H) 46 - 116 U/L    Total Protein 6 4 6 4 - 8 2 g/dL    Albumin 2 1 (L) 3 5 - 5 0 g/dL    Total Bilirubin 1 26 (H) 0 20 - 1 00 mg/dL    eGFR 13 ml/min/1 73sq m   Lactic acid    Collection Time: 10/09/21  2:47 PM   Result Value Ref Range    LACTIC ACID 1 7 0 5 - 2 0 mmol/L   Procalcitonin with AM Reflex    Collection Time: 10/09/21  2:47 PM   Result Value Ref Range    Procalcitonin 21 02 (H) <=0 25 ng/ml   Protime-INR    Collection Time: 10/09/21  2:47 PM   Result Value Ref Range    Protime 18 6 (H) 11 6 - 14 5 seconds    INR 1 63 (H) 0 84 - 1 19   APTT    Collection Time: 10/09/21  2:47 PM   Result Value Ref Range    PTT 35 23 - 37 seconds   Blood culture #2    Collection Time: 10/09/21  2:47 PM    Specimen: Arm, Right; Blood   Result Value Ref Range    Blood Culture Gram-Negative Rods Anaerobic (Group) (A)     Gram Stain Result Gram negative rods (A)    NT-BNP PRO    Collection Time: 10/09/21  2:47 PM   Result Value Ref Range    NT-proBNP 9,655 (H) <450 pg/mL   Lipase    Collection Time: 10/09/21  2:47 PM   Result Value Ref Range    Lipase 84 73 - 393 u/L   Troponin I    Collection Time: 10/09/21  2:47 PM   Result Value Ref Range    Troponin I 0 09 (H) <=0 04 ng/mL   Manual Differential(PHLEBS Do Not Order)    Collection Time: 10/09/21  2:47 PM   Result Value Ref Range    Segmented % 86 (H) 43 - 75 %    Bands % 8 0 - 8 %    Lymphocytes % 1 (L) 14 - 44 %    Monocytes % 5 4 - 12 %    Eosinophils, % 0 0 - 6 %    Basophils % 0 0 - 1 %    Absolute Neutrophils 5 78 1 85 - 7 62 Thousand/uL    Lymphocytes Absolute 0 06 (L) 0 60 - 4 47 Thousand/uL    Monocytes Absolute 0 31 0 00 - 1 22 Thousand/uL    Eosinophils Absolute 0 00 0 00 - 0 40 Thousand/uL Basophils Absolute 0 00 0 00 - 0 10 Thousand/uL    Total Counted      Anisocytosis Present     [place] Cells Present     Ovalocytes Present     Polychromasia Present     Platelet Estimate Adequate Adequate   Procalcitonin Reflex    Collection Time: 10/10/21  6:21 AM   Result Value Ref Range    Procalcitonin 78 78 (H) <=0 25 ng/ml   Vancomycin, random    Collection Time: 10/10/21  6:21 AM   Result Value Ref Range    Vancomycin Rm 19 1 ug/mL   Magnesium    Collection Time: 10/10/21  6:21 AM   Result Value Ref Range    Magnesium 1 7 1 6 - 2 6 mg/dL   Comprehensive metabolic panel    Collection Time: 10/10/21  6:21 AM   Result Value Ref Range    Sodium 138 136 - 145 mmol/L    Potassium 3 8 3 5 - 5 3 mmol/L    Chloride 109 (H) 100 - 108 mmol/L    CO2 18 (L) 21 - 32 mmol/L    ANION GAP 11 4 - 13 mmol/L    BUN 44 (H) 5 - 25 mg/dL    Creatinine 3 60 (H) 0 60 - 1 30 mg/dL    Glucose 107 65 - 140 mg/dL    Calcium 8 5 8 3 - 10 1 mg/dL    Corrected Calcium 9 3 8 3 - 10 1 mg/dL    AST 72 (H) 5 - 45 U/L    ALT 15 12 - 78 U/L    Alkaline Phosphatase 81 46 - 116 U/L    Total Protein 6 3 (L) 6 4 - 8 2 g/dL    Albumin 3 0 (L) 3 5 - 5 0 g/dL    Total Bilirubin 1 60 (H) 0 20 - 1 00 mg/dL    eGFR 12 ml/min/1 73sq m   Phosphorus    Collection Time: 10/10/21  6:21 AM   Result Value Ref Range    Phosphorus 4 9 (H) 2 3 - 4 1 mg/dL   CBC and differential    Collection Time: 10/10/21  6:21 AM   Result Value Ref Range    WBC 4 97 4 31 - 10 16 Thousand/uL    RBC 2 50 (L) 3 81 - 5 12 Million/uL    Hemoglobin 7 1 (L) 11 5 - 15 4 g/dL    Hematocrit 23 2 (L) 34 8 - 46 1 %    MCV 93 82 - 98 fL    MCH 28 4 26 8 - 34 3 pg    MCHC 30 6 (L) 31 4 - 37 4 g/dL    RDW 16 7 (H) 11 6 - 15 1 %    MPV 10 4 8 9 - 12 7 fL    Platelets 447 678 - 952 Thousands/uL    nRBC 0 /100 WBCs   Ferritin    Collection Time: 10/10/21  6:21 AM   Result Value Ref Range    Ferritin 1,668 (H) 8 - 388 ng/mL   Folate    Collection Time: 10/10/21  6:21 AM   Result Value Ref Range Folate 11 5 3 1 - 17 5 ng/mL   Vitamin B12    Collection Time: 10/10/21  6:21 AM   Result Value Ref Range    Vitamin B-12 390 100 - 900 pg/mL   Iron Saturation %    Collection Time: 10/10/21  6:21 AM   Result Value Ref Range    Iron Saturation 10 (L) 15 - 50 %    TIBC 104 (L) 250 - 450 ug/dL    Iron 10 (L) 50 - 170 ug/dL   Ferritin    Collection Time: 10/10/21  6:21 AM   Result Value Ref Range    Ferritin 1,650 (H) 8 - 388 ng/mL   UA w Reflex to Microscopic w Reflex to Culture    Collection Time: 10/10/21 12:56 PM    Specimen: Urine, Other   Result Value Ref Range    Color, UA Dk Yellow     Clarity, UA Turbid     Specific [place], UA 1 018 1 003 - 1 030    pH, UA 8 5 (A) 4 5, 5 0, 5 5, 6 0, 6 5, 7 0, 7 5, 8 0    Leukocytes, UA Moderate (A) Negative    Nitrite, UA Negative Negative    Protein,  (2+) (A) Negative mg/dl    Glucose, UA Negative Negative mg/dl    Ketones, UA Negative Negative mg/dl    Urobilinogen, UA 0 2 0 2, 1 0 E U /dl E U /dl    Bilirubin, UA Negative Negative    Blood, UA Large (A) Negative   Urine Microscopic    Collection Time: 10/10/21 12:56 PM   Result Value Ref Range    RBC, UA 20-30 (A) None Seen, 2-4 /hpf    WBC, UA 20-30 (A) None Seen, 2-4, 5-60 /hpf    Epithelial Cells Occasional None Seen, Occasional /hpf    Bacteria, UA Moderate (A) None Seen, Occasional /hpf   Hemoglobin and hematocrit, blood    Collection Time: 10/10/21  4:47 PM   Result Value Ref Range    Hemoglobin 7 2 (L) 11 5 - 15 4 g/dL    Hematocrit 23 0 (L) 34 8 - 46 1 %   Hemoglobin and hematocrit, blood    Collection Time: 10/11/21 12:28 AM   Result Value Ref Range    Hemoglobin 6 7 (LL) 11 5 - 15 4 g/dL    Hematocrit 21 5 (L) 34 8 - 46 1 %   Type and screen    Collection Time: 10/11/21  3:37 AM   Result Value Ref Range    ABO Grouping AB     Rh Factor Positive     Antibody Screen Negative     Specimen Expiration Date [id number]    APTT    Collection Time: 10/11/21  3:37 AM   Result Value Ref Range    PTT 46 (H) 23 - 37 seconds   Protime-INR    Collection Time: 10/11/21  3:37 AM   Result Value Ref Range    Protime 21 7 (H) 11 6 - 14 5 seconds    INR 2 00 (H) 0 84 - 1 19   CBC and differential    Collection Time: 10/11/21  3:37 AM   Result Value Ref Range    WBC 4 85 4 31 - 10 16 Thousand/uL    RBC 2 39 (L) 3 81 - 5 12 Million/uL    Hemoglobin 6 9 (LL) 11 5 - 15 4 g/dL    Hematocrit 22 0 (L) 34 8 - 46 1 %    MCV 92 82 - 98 fL    MCH 28 9 26 8 - 34 3 pg    MCHC 31 4 31 4 - 37 4 g/dL    RDW 16 9 (H) 11 6 - 15 1 %    MPV 10 2 8 9 - 12 7 fL    Platelets 609 619 - 342 Thousands/uL   Basic metabolic panel    Collection Time: 10/11/21  3:37 AM   Result Value Ref Range    Sodium 141 136 - 145 mmol/L    Potassium 3 0 (L) 3 5 - 5 3 mmol/L    Chloride 113 (H) 100 - 108 mmol/L    CO2 20 (L) 21 - 32 mmol/L    ANION GAP 8 4 - 13 mmol/L    BUN 51 (H) 5 - 25 mg/dL    Creatinine 3 55 (H) 0 60 - 1 30 mg/dL    Glucose 93 65 - 140 mg/dL    Calcium 7 9 (L) 8 3 - 10 1 mg/dL    eGFR 12 ml/min/1 73sq m   Manual Differential(PHLEBS Do Not Order)    Collection Time: 10/11/21  3:37 AM   Result Value Ref Range    Segmented % 84 (H) 43 - 75 %    Bands % 4 0 - 8 %    Lymphocytes % 6 (L) 14 - 44 %    Monocytes % 3 (L) 4 - 12 %    Eosinophils, % 0 0 - 6 %    Basophils % 0 0 - 1 %    Metamyelocytes% 3 (H) 0 - 1 %    Absolute Neutrophils 4 27 1 85 - 7 62 Thousand/uL    Lymphocytes Absolute 0 29 (L) 0 60 - 4 47 Thousand/uL    Monocytes Absolute 0 15 0 00 - 1 22 Thousand/uL    Eosinophils Absolute 0 00 0 00 - 0 40 Thousand/uL    Basophils Absolute 0 00 0 00 - 0 10 Thousand/uL    Total Counted      RBC Morphology Present     Anisocytosis Present     Ovalocytes Present     Poikilocytes Present     Platelet Estimate Adequate Adequate    Artifact Present    Magnesium    Collection Time: 10/11/21  3:37 AM   Result Value Ref Range    Magnesium 1 7 1 6 - 2 6 mg/dL   Phosphorus    Collection Time: 10/11/21  3:37 AM   Result Value Ref Range    Phosphorus 5 0 (H) 2 3 - 4 1 mg/dL   LD,Blood    Collection Time: 10/11/21  3:37 AM   Result Value Ref Range     (H) 81 - 234 U/L   Prepare Leukoreduced RBC: 1 Units, Leukoreduced    Collection Time: 10/11/21  6:35 AM   Result Value Ref Range    Unit Product Code E1122G38     Unit Number [id number]     Unit ABO AB     Unit RH POS     Crossmatch Compatible     Unit Dispense Status Issued     Unit Product Volume 350 mL   Hemoglobin and hematocrit, blood    Collection Time: 10/11/21 10:59 AM   Result Value Ref Range    Hemoglobin 8 5 (L) 11 5 - 15 4 g/dL    Hematocrit 27 1 (L) 34 8 - 46 1 %       CT abdomen pelvis wo contrast    Result Date: 10/9/2021  Narrative: CT ABDOMEN AND PELVIS WITHOUT IV CONTRAST INDICATION:   Abdominal pain, acute, nonlocalized LLQ ab pain  COMPARISON:  5/8/2021 TECHNIQUE:  CT examination of the abdomen and pelvis was performed without intravenous contrast   Axial, sagittal, and coronal 2D reformatted images were created from the source data and submitted for interpretation  Radiation dose length product (DLP) for this visit:  767 4 mGy-cm   This examination, like all CT scans performed in the [facility], was performed utilizing techniques to minimize radiation dose exposure, including the use of iterative reconstruction and automated exposure control  Enteric contrast was administered  FINDINGS: ABDOMEN LOWER CHEST: Small left pleural effusion is identified with probable atelectasis at the left lung base  LIVER/BILIARY TREE:  Absent GALLBLADDER:  No calcified gallstones  No pericholecystic inflammatory change  The common bile duct measures 9 mm and tapers towards the biliary papilla  SPLEEN:  Unremarkable  PANCREAS:  Unremarkable  ADRENAL GLANDS:  Unremarkable  KIDNEYS/URETERS: Nonobstructing calculi at the lower pole of the right kidney  No hydronephrosis on the right   There is a stent medial to the left kidney which now demonstrates evidence of collection with some gas in perhaps some high density hemorrhage  This measures 7 6 x 6 6 x 6 2 cm  No calyces are seen to suggest acute hydronephrosis  There is perinephric  stranding and thickening of the combined interfascial plane suggesting some hemorrhage at this level  Catheter extends from the ostomy along the course of the left ureter  STOMACH AND BOWEL:  No significant small bowel dilatation  One fluid-filled loop in the pelvis could represent small bowel  There are fluid-filled loops of colon and gas suggestive of ileus  APPENDIX:  The appendix is not well seen  ABDOMINOPELVIC CAVITY: There is perihepatic and perisplenic ascites which is new as well as denser fluid in the pelvis suggesting possible hemoperitoneum  There is a cyst or collection the right lower quadrant similar to the prior study measuring approximately 6 7 x 5 6 cm  VESSELS:  Unremarkable for patient's age  PELVIS REPRODUCTIVE ORGANS:  Possible small calcified fibroid may be present without change  Elba Damon URINARY BLADDER:  Status post cystectomy with ileal conduit  ABDOMINAL WALL/INGUINAL REGIONS:  Unremarkable  OSSEOUS STRUCTURES:  No acute fracture or destructive osseous lesion  Compression fracture of T12 with mild protrusion of the posterior cortex is noted similar to the prior study of April  Impression: In the left renal fossa, there is a collection of mixed intermediate and high attenuation suggesting a hematoma with some bubbles of gas  Pigtail catheter is noted medial to the kidney  The renal pelvis may be collapsed  There appears to be some hemorrhage and thickening extending in the combined interfascial plane of the retroperitoneum as well as some high density fluid within the pelvis suggesting hemoperitoneum  Superinfection of the kidney is possible  Urology evaluation is advised  Nonobstructing calculi in the right kidney  Some prominent loops of bowel are more likely large bowel suggesting ileus   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  This was discussed with Dr Roque Pereira at 4:20 PM Workstation performed: [id number]     XR chest portable    Result Date: 10/10/2021  Narrative: CHEST INDICATION:   sepsis eval  COMPARISON:  10/3/2021 EXAM PERFORMED/VIEWS:  XR CHEST PORTABLE FINDINGS: Cardiomediastinal silhouette appears unremarkable  Medial left base small airspace opacity with partial medial left hemidiaphragm obscuration and left costophrenic angle blunting  Daysi Cruel No pneumothorax or pleural effusion  Osseous structures appear within normal limits for patient age  Impression: Medial left base slight atelectasis and or small infiltrate  Small left effusion The study was marked in EPIC for immediate notification  Workstation performed: [id number]     IR nephrostomy tube placement    Result Date: 9/21/2021  Narrative: PROCEDURE: Nephrostomy tube placement  Retrograde PCN U replacement  STAFF: ROXANN Randolph  CONTRAST: 40 mL Omnipaque 350  FLUOROSCOPY TIME: 25 minutes  NUMBER OF IMAGES: Multiple COMPLICATIONS: None  MEDICATIONS: General anesthesia was provided by the anesthesia team, please refer to their note for details  INDICATION: History of left ilial conduit ureteral obstruction, managed by retrograde left PCNU  The tube has recently become completely dislodged  PROCEDURE: At presentation, the patient's retrograde PCN U has been completely dislodged  The patient was placed left side upon the table to gain access to both the left kidney as well as ostomy for replacement  The left kidney was localized with ultrasound, the skin and underlying subcutaneous tissues were anesthetized with local lidocaine  A 21-gauge needle was advanced into the renal collecting system under ultrasound guidance, injection of contrast confirmed placement within the collecting system    The system was upsized with an AccuStick transitional dilator, and with use of a angled catheter and Glidewire, access through the ileal conduit and out of the ostomy was achieved  The catheter was then  placed over the wire from the ostomy site and advanced back into the collecting system, after which a stiff Amplatz wire was placed  A new 40 cm x 10-[place] locking pigtail retrograde PCNU was placed over the wire and advanced to the collecting system  The pigtail was locked  Injection of contrast confirmed appropriate placement  Considering this was a new access site into the left kidney, an 8-[place] locking pigtail percutaneous nephrostomy tube was placed, to help avoid complications of removing this access just after placement  Injection of contrast through this drain demonstrated appropriate position within the collecting system, and brisk uptake of contrast into the retrograde PCNU  FINDINGS: Existing left PCN U completely dislodged at presentation  Moderate hydronephrosis of an atrophic left kidney  Impression: Successful left 10-[place] by 40 cm retrograde PCN U placement  Left percutaneous nephrostomy tube placement  PLAN: The left percutaneous nephrostomy tube can be capped, patient should return in 2 weeks for possible removal  The retrograde PCN U  Rudy drain via the ostomy, the patient should return in 8-12 weeks for routine exchange  Workstation performed: [id number]     XR chest portable ICU    Result Date: 10/4/2021  Narrative: CHEST INDICATION:   [facility]J TLC placement  COMPARISON:  Chest radiograph from 7/15/2021, abdomen CT from 5/8/2021, chest CT from 12/17/2015  EXAM PERFORMED/VIEWS:  XR CHEST PORTABLE ICU FINDINGS:  Left jugular catheter with a transverse orientation in the left brachiocephalic vein  Cardiomediastinal silhouette appears unremarkable  Mild pulmonary venous congestion  No pneumothorax  No effusion  Cholecystectomy  Osseous structures appear within normal limits for patient age       Impression: Left jugular catheter with a transverse orientation in the left brachiocephalic vein with no pneumothorax  Mild pulmonary venous congestion  Workstation performed: [id number]     IR nephrostomy tube check/change/reposition/reinsertion/upsize    Result Date: 10/6/2021  Narrative: Procedure: 1  Left nephrostomy tube check and removal 2  Left retrograde PCNU check  3   Cone beam CT Clinical History: 71-year-old female with left ureteral obstruction is setting of prior conduit  Contrast: 10 mL of iohexol (OMNIPAQUE) Fluoro time: 2 6 minutes Number of Images: Multiple Radiation dose: 219 mGy Conscious sedation time: na Technique: The patient was brought to the interventional radiology suite and placed in the right lateral decubitus position on the table  After a  view was obtained, contrast was injected into the left nephrostomy drainage catheter and images were obtained  The nephrostomy is in appropriate location  Next the left PCNU was evaluated with contrast and was also appropriate position  The left PICC and catheter pigtail locking mechanism was released and the skin suture was removed  A [employer] wire was advanced through the catheter curled in the renal collecting system  The catheter was removed over a wire  The wire was then removed  Blood was noted within the nephrostomy catheter ostium and the patient noted new left flank fullness  There is also new blood draining from the right PCNU  At this point a cone beam CT was performed demonstrating no significant perinephric hematoma  The left retrograde catheter was again evaluated and appeared to be appropriate location  A sterile dressing was applied over the left PCN site  Impression: Impression: 1  Left nephrostomy tube check and removal  2   Left PCNU catheter check, the catheter is in appropriate location  Workstation performed: [id number]     IR nephrostomy tube check/change/reposition/reinsertion/upsize    Result Date: 9/16/2021  Narrative: Retrograde left PCNU check and exchange   Clinical History: Left ureteral obstruction in the setting of prior conduit, percutaneous nephrostomy placed May Contrast: 10 mL of iohexol (OMNIPAQUE) Fluoro time: 2 6 Minutes Number of Images: Multiple Radiation dose: 76 mGy Conscious sedation time: N/A Technique/intervention: The patient was placed in a prone position on the fluoroscopic table  The abdomen was prepped and draped in the usual sterile fashion  Timeout was performed per protocol  Contrast was injected through the existing retrograde PCNU confirming appropriate location within the renal collecting system  The pigtail locking mechanism was released and the catheter was removed over a [employer] wire  A new 10-Khmer by 45 cm all-purpose drainage catheter was advanced over the wire with the pigtail curled in the renal collecting system  The pigtail locking mechanism was engaged and contrast injected through the catheter confirming appropriate location within the renal collecting system  Impression: Impression: 1  Retrograde left PCNU check and exchange  Workstation performed: [id number]     US bedside procedure    Result Date: 10/9/2021  Narrative: 1 2 [id number]  2 446 246 [id number]      Labs and pertinent reports reviewed  This note has been generated by voice recognition software system  Therefore, there may be spelling, grammar, and or syntax errors  Please contact if questions arise      Bonna Kussmaul MS4

## 2021-10-11 NOTE — PROCEDURES
Central Line    Date/Time: 10/11/2021 7:32 PM  Performed by: Cally Li MD  Authorized by: Cally Li MD     Patient location:  IR  Other Assisting Provider: No    Consent:     Consent obtained:  Written    Consent given by:  Patient    Risks discussed:  Arterial puncture, bleeding and infection    Alternatives discussed:  No treatment and delayed treatment  Universal protocol:     Procedure explained and questions answered to patient or proxy's satisfaction: yes      Relevant documents present and verified: yes      Test results available and properly labeled: yes      Radiology Images displayed and confirmed  If images not available, report reviewed: yes      Required blood products, implants, devices, and special equipment available: yes      Site/side marked: yes      Immediately prior to procedure, a time out was called: yes      Patient identity confirmed:  Verbally with patient and arm band  Pre-procedure details:     Hand hygiene: Hand hygiene performed prior to insertion      Sterile barrier technique: All elements of maximal sterile technique followed      Skin preparation:  Povidone-iodine    Skin preparation agent: Skin preparation agent completely dried prior to procedure    Indications:     Central line indications: no peripheral vascular access    Procedure details:     Location: right EJ  Vessel type: vein      Laterality:  Right    Approach: percutaneous technique used      Patient position:  Flat    Catheter type:  Double lumen    Catheter size:  5 Fr    Landmarks identified: yes      Ultrasound guidance: yes      Sterile ultrasound techniques: Sterile gel and sterile probe covers were used      Number of attempts:  1    Successful placement: yes    Post-procedure details:     Post-procedure:  Dressing applied and line sutured    Assessment:  Placement verified by x-ray and blood return through all ports    Post-procedure complications: none      Patient tolerance of procedure:   Tolerated well, no immediate complications  Comments:      Double lumen power injectable 5 [place]ian right external jugular non tunneled central venous catheter, 17 cm length, immediate use okay

## 2021-10-11 NOTE — ASSESSMENT & PLAN NOTE
Chronic saddle pulmonary embolus  Eliquis 2 5 mg bid at home, currently on hold for now if IR wants to do intervention  Hg trended down to 6 9 overnight, given one unit PRBC  Recommend restarting eliquis once Hg and hematoma stabilize

## 2021-10-11 NOTE — PROGRESS NOTES
NEPHROLOGY HOSPITAL PROGRESS NOTE   Inocencio Hayes 77 y o  female MRN: [id number]  Unit/Bed#: [facility] 929-01 Encounter: [id number]  Reason for Consult:  CKD stage 5    ASSESSMENT and PLAN:    64-year-old female with past medical history of CKD, hypertension, hyperparathyroidism, anemia, bilateral hydronephrosis secondary to obstructive uropathy and 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 in October 2016, status post nephrostomy tube placement in May 2021, status post left percutaneous retrograde percutaneous nephrostomy tube placement in September 2021 who initially presents with abdominal pain on October 9th  There is concern for hematoma  Also with elective right brachial basilic fistula superficialization with transposition on September 30th  1) CKD stage 5    - baseline creatinine 3-3 5 mg/dL  -outpatient nephrologist Dr Porfirio Zhang   -urinalysis 20-30 RBC, 20-30 WBC  - access-AV fistula with placed into January 2021 and status post fistulogram with angioplasty earlier this urine also status post transposition on September 30t  -10/11-creatinine stable  3 6 mg/dL  Potassium low being repleted  Plan  -hold Lasix today (to note, pt was not on prior? And has not received this admission)    Monitor daily for Lasix needs  - give lasix if needed for pulm edema after transfusion  -BMP and phosphorus in a m   - transfusion per Primary team  - avoid hypotension  - avoid nephrotoxic agnets    2) retroperitoneal hematoma-due to perinephric hematoma    -urology on board  -concern for possible sepsis due to initial fever and leukocytosis-ID on board-on empiric antibiotics    3) electrolytes    -hypokalemia-being repleted    4) acid/base    -on sodium bicarbonate tablets-improving bicarbonate    5) anemia-    -transfusion per primary team  - f/u haptoglobin    6) history of cystectomy with ileal conduit in 2016 due to bladder dysfunction and 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 and 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 with left nephrostomy tube and nephroureteral catheter    -on October 4th, nephrostomy tube was removed  -most recent CT scan concerning for hemoperitoneum, perinephric hematoma    7) hemodialysis access-vascular surgery team on board due to arm swelling    -duplex--pending    8) MBD-on calcitriol and vitamin-D    9) volume-monitor with transfusion    10) pl effusion - per Primary team; small L effusion    SUBJECTIVE / 24H INTERVAL HISTORY:    Blood pressure is [phone] systolic  Afebrile  Pt denies complaints  No SOB       OBJECTIVE:  Current Weight: Weight - Scale: 86 5 kg (190 lb 11 2 oz)  Vitals:    10/11/21 0622 10/11/21 0653 10/11/21 0700 10/11/21 0722   BP: 137/75 137/75 124/67 123/66   Pulse: 94 92 90 90   Resp: 17 17     Temp: 98 4 °F (36 9 °C) 98 4 °F (36 9 °C)  98 °F (36 7 °C)   TempSrc:       SpO2: 97%   95%   Weight:       Height:           Intake/Output Summary (Last 24 hours) at 10/11/2021 0836  Last data filed at 10/11/2021 0346  Gross per 24 hour   Intake 240 ml   Output 250 ml   Net -10 ml     General: NAD  Skin: no rash  Eyes: anicteric sclera  ENT: moist mucous membrane  Neck: supple  Chest: diminished air intake bases  CVS: s1s2, no murmur, no gallop, no rub  Abdomen: soft, nontender, nl sounds  Extremities: trace edema LE  : no camp  Neuro: AAOX3  Psych: normal affect    Medications:    Current Facility-Administered Medications:     acetaminophen (TYLENOL) tablet 650 mg, 650 mg, Oral, Q6H PRN, Carlos Jefferson MD    acetaminophen (TYLENOL) tablet 975 mg, 975 mg, Oral, Q8H [address], Carlos Jefferson MD, 975 mg at 10/11/21 0721    aluminum-magnesium hydroxide-simethicone (MYLANTA) oral suspension 30 mL, 30 mL, Oral, Q6H PRN, Carlos Jefferson MD    atorvastatin (LIPITOR) tablet 40 mg, 40 mg, Oral, HS, Carlos Jefferson MD, 40 mg at 10/10/21 2233    calcitriol (ROCALTROL) capsule 0 25 mcg, 0 25 mcg, Oral, Daily, Nando Aguilera MD, 0 25 mcg at 10/10/21 0837    cefTRIAXone (ROCEPHIN) 1,000 mg in dextrose 5 % 50 mL IVPB, 1,000 mg, Intravenous, Q24H, Nando Aguilera MD, Stopped at 10/10/21 1942    cholecalciferol (VITAMIN D) oral liquid 800 Units, 800 Units, Oral, Daily, Nando Aguilera MD, 800 Units at 10/10/21 0838    citalopram (CeleXA) tablet 20 mg, 20 mg, Oral, Daily, Nando Aguilera MD, 20 mg at 10/10/21 0837    docusate sodium (COLACE) capsule 100 mg, 100 mg, Oral, BID, Nando Aguilera MD, 100 mg at 10/09/21 2232    fentanyl citrate (PF) 100 MCG/2ML 25 mcg, 25 mcg, Intravenous, Q2H PRN, Nando Aguilera MD    furosemide (LASIX) tablet 20 mg, 20 mg, Oral, Daily, Nando Aguliera MD    levothyroxine tablet 75 mcg, 75 mcg, Oral, Daily, Nando Aguilera MD, 75 mcg at 10/10/21 0837    melatonin tablet 3 mg, 3 mg, Oral, HS, Nando Aguilera MD, 3 mg at 10/10/21 2233    metoprolol tartrate (LOPRESSOR) tablet 25 mg, 25 mg, Oral, BID, Nando Aguilera MD, 25 mg at 10/09/21 2232    ondansetron (ZOFRAN) injection 4 mg, 4 mg, Intravenous, Q6H PRN, Nando Aguilera MD    potassium chloride 20 mEq IVPB (premix), 20 mEq, Intravenous, Once, Francisco Delarosa PA-C, Last Rate: 50 mL/hr at 10/11/21 0720, 20 mEq at 10/11/21 0720    saccharomyces boulardii (FLORASTOR) capsule 250 mg, 250 mg, Oral, Daily, Nando Aguilera MD, 250 mg at 10/10/21 0837    senna (SENOKOT) tablet 8 6 mg, 1 tablet, Oral, Daily, Nando Aguilera MD    sodium bicarbonate tablet 650 mg, 650 mg, Oral, BID after meals, Nando Aguilera MD, 650 mg at 10/10/21 1716    Laboratory Results:  Results from last 7 days   Lab Units 10/11/21  0337 10/11/21  0028 10/10/21  1647 10/10/21  0621 10/09/21  1447 10/05/21  0652 10/05/21  0005   WBC Thousand/uL 4 85  --   --  4 97 6 15  --   --    HEMOGLOBIN g/dL 6 9* 6 7* 7 2* 7 1* 9 3* 8 2* 8 3*   HEMATOCRIT % 22 0* 21 5* 23 0* 23 2* 29 8* 26 2* 26 3*   PLATELETS Thousands/uL 212  --   --  187 276  --   --    POTASSIUM mmol/L 3 0*  --   --  3 8 3 2*  --   --    CHLORIDE mmol/L 113*  --   --  109* 110*  --   --    CO2 mmol/L 20*  --   --  18* 16*  --   --    BUN mg/dL 51*  --   --  44* 37*  --   --    CREATININE mg/dL 3 55*  --   --  3 60* 3 26*  --   --    CALCIUM mg/dL 7 9*  --   --  8 5 9 3  --   --    MAGNESIUM mg/dL  --   --   --  1 7  --   --   --    PHOSPHORUS mg/dL  --   --   --  4 9*  --   --   --

## 2021-10-11 NOTE — TELEMEDICINE
e-Consult (IPC)  - Interventional Radiology  Naz Brown 77 y o  female MRN: [id number]  Unit/Bed#: [facility] 929-01 Encounter: [id number]    IR has been consulted to evaluate the patient, determine the appropriate procedure, and whether or not a procedure can and should be performed regarding the care of Naz Brown  We were consulted by vascular surgery concerning arm swelling, and to possibly perform a fistulagram if medically appropriate for the patient  IP Consult to IR  Consult performed by: Bella Valdez MD  Consult ordered by: Fernandez Browne PA-C        10/11/21      Assessment/Recommendation:   Patient is a 43-year-old female status post placement of a right upper extremity fistula which was recently superficial eyes  Patient has arm swelling  A fistulagram has been requested this admission  Currently, the patient is admitted with sepsis and C diff  A thoracentesis and a central line placement have also been requested by other referring providers  The patient is not currently on dialysis or using this fistula  The patient does not appear to be clinically stable enough at this point to have an elective procedure  Will plan to do the procedure later this admission when she is more stable, or soon after as an outpatient  Today, she will be evaluated for a thoracentesis and have a central line placed  Total time spent in review of data, discussion with requesting provider and rendering advice was 20 minutes  Patient or appropriate family member was verbally informed by vascular surgery of this consultative service on their behalf to provide more timely access to specialty care in lieu of an in person consultation  Verbal consent was obtained  Thank you for allowing Interventional Radiology to participate in the care of Naz Brown  Please don't hesitate to call or TigerText us with any questions       Bella Valdez MD

## 2021-10-11 NOTE — PHYSICAL THERAPY NOTE
Physical Therapy Evaluation     Patient's Name: [facility]    Admitting Diagnosis  Abdominal pain [R10 9]  Renal hematoma [S37 019A]  Sepsis ([facility] ) [A41 9]    Problem List  Patient Active Problem List   Diagnosis    Chronic saddle pulmonary embolism ([facility] )    Bladder mass    Small bowel obstruction ([address] )    Obstructive uropathy    Secondary hyperparathyroidism of renal origin ([facility] )    Hypertension    Polycythemia vera ([facility] )    Intraventricular hemorrhage (HCC)    Anemia due to stage 5 chronic kidney disease, not on chronic dialysis ([facility] )    Mass of urethra    Sepsis ([facility] )    Acute renal failure superimposed on stage 5 chronic kidney disease, not on chronic dialysis ([facility] )    Thoracic compression fracture (HCC)    Benign neoplasm of supratentorial region of brain ([facility] )    Meningioma ([facility] )    Inverted T wave    Polycythemia    History of Clostridioides difficile colitis    History of shingles    Depression    Class 2 severe obesity due to excess calories with serious comorbidity and body mass index (BMI) of 35 0 to 35 9 in adult [facility])    Chronic thrombosis of subclavian vein (HCC)    Hyperlipemia    Gross hematuria    Hydronephrosis of left kidney    Anemia    Constipation    Obstructive left pyelonephritis    Right upper quadrant cyst    Obesity, morbid (HCC)    Febrile illness    COVID-19    Edema of right upper extremity    Stage 5 chronic kidney disease not on chronic dialysis ([facility] )    Ambulatory dysfunction    Iron deficiency anemia due to chronic blood loss    Poor venous access    Renal hematoma, left    Pleural effusion    Localized swelling of right upper extremity       Past Medical History  Past Medical History:   Diagnosis Date    Anxiety     Bowel obstruction (HCC)     Chronic kidney disease     Chronic kidney disease (CKD), stage IV (severe) (HCC)     stage IV    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    Circulation problem     Compression fracture of cervical spine ([facility] )     COVID-19 07/2021    hospitalized     Hernia of abdominal cavity     History of kidney problems     History of transfusion     Hydronephrosis     Hypertension     IBS (irritable bowel syndrome)     Incontinence     Lung mass     Improving on PET/CT 1/2016    Polycythemia vera ([facility] )     Pulmonary embolism ([facility] ) 2014    Shingles     Urinary tract infection        Past Surgical History  Past Surgical History:   Procedure Laterality Date    ABDOMINAL ADHESION SURGERY N/A 8/9/2020    Procedure: LYSIS ADHESIONS;  Surgeon: Vicky Alarcon DO;  [facility];  Service: General    BLADDER SUSPENSION      BOTOX INJECTION N/A 7/27/2016    Procedure: BOTOX INJECTION ;  Surgeon: Angella Sandoval MD;  [facility];  Service:     CHOLECYSTECTOMY N/A     COLONOSCOPY      CYSTECTOMY, RADICAL WITH ILEOCONDUIT N/A 10/4/2016    Procedure: Anjana Ronnie ;  Surgeon: Angella Sandoval MD;  [facility];  Service:    [place] W/ RETROGRADES Bilateral 7/27/2016    Procedure: CYSTOSCOPY; RETROGRADE PYELOGRAM ;  Surgeon: Angella Sandoval MD;  [facility];  Service:     HERNIA REPAIR      IR AV FISTULAGRAM/GRAFTOGRAM  2/10/2021    IR NEPHROSTOMY TO NEPHROURETERAL STENT  5/15/2021    IR NEPHROSTOMY TO NEPHROURETERAL STENT  6/9/2021    IR NEPHROSTOMY TUBE CHECK AND/OR REMOVAL  6/16/2021    IR NEPHROSTOMY TUBE CHECK/CHANGE/REPOSITION/REINSERTION/UPSIZE  5/14/2021    IR NEPHROSTOMY TUBE CHECK/CHANGE/REPOSITION/REINSERTION/UPSIZE  5/21/2021    IR NEPHROSTOMY TUBE CHECK/CHANGE/REPOSITION/REINSERTION/UPSIZE  9/16/2021    IR NEPHROSTOMY TUBE CHECK/CHANGE/REPOSITION/REINSERTION/UPSIZE  10/4/2021    IR NEPHROSTOMY TUBE PLACEMENT  5/10/2021    IR NEPHROSTOMY TUBE PLACEMENT  9/20/2021    IR NON-TUNNELED CENTRAL LINE PLACEMENT  7/17/2020    IR NON-TUNNELED CENTRAL LINE PLACEMENT  8/14/2020    IR NON-TUNNELED CENTRAL LINE PLACEMENT  7/16/2021    LAPAROTOMY N/A 8/9/2020    Procedure: LAPAROTOMY EXPLORATORY, PARASTOMAL HERNIA REPAIR WITH MESH;  Surgeon: Mary Rudd DO;  [facility];  Service: General    NJ ANASTOMOSIS,AV,ANY SITE Right 1/5/2021    Procedure: Creation of right brachiobasilic fistula; Surgeon: Latesha Chairez MD;  [facility];  Service: Vascular    NJ COLONOSCOPY FLX DX W/COLLJ SPEC WHEN PFRMD N/A 8/31/2016    Procedure: COLONOSCOPY;  Surgeon: Gabby Bill MD;  Location: [facility]; Service: Gastroenterology    NJ CYSTOSCOPY,INSERT URETERAL STENT Bilateral 7/27/2016    Procedure: STENT INSERTION; EXCISION OF MESH ;  Surgeon: Dawna Gleason MD;  [facility];  Service: Urology    NJ REVISE AV FISTULA,W/O THROMBECTOMY Right 9/30/2021    Procedure: Superficialization and transposition of right brachiobasilic fistula; Surgeon: Juan Diego Chairez MD;  [facility];  Service: Vascular    TONSILLECTOMY      TUBAL LIGATION      WISDOM TOOTH EXTRACTION          10/11/21 1001   PT Last Visit   PT Visit Date 10/11/21   Note Type   Note type Evaluation   Pain Assessment   Pain Assessment Tool FLACC   Pain Location/Orientation Orientation: Left;Orientation: Lower; Location: Abdomen   Patient's Stated Pain Goal No pain   Pain Rating: FLACC (Rest) - Face 0   Pain Rating: FLACC (Rest) - Legs 0   Pain Rating: FLACC (Rest) - Activity 0   Pain Rating: FLACC (Rest) - Cry 0   Pain Rating: FLACC (Rest) - Consolability 0   Score: FLACC (Rest) 0   Pain Rating: FLACC (Activity) - Face 1   Pain Rating: FLACC (Activity) - Legs 1   Pain Rating: FLACC (Activity) - Activity 1   Pain Rating: FLACC (Activity) - Cry 1   Pain Rating: FLACC (Activity) - Consolability 1   Score: FLACC (Activity) 5   Home Living   Type of Home House   Home Layout Two level;Bed/bath upstairs  (6 MARLON; FF to bed/ bath upstairs )   Bathroom Shower/Tub Walk-in shower   Bathroom Toilet Standard   Bathroom Equipment Grab bars in shower; Shower chair   Home Equipment Walker;Cane  (primarily used cane PTA )   Prior Function   Level of [place] Independent with ADLs and functional mobility   Lives With Alone   Receives Help From [facility] Independent   Falls in the last 6 months 0   Vocational Retired   Comments (+) drive    Restrictions/Precautions   [place] Bearing Precautions Per Order No   Other Precautions Contact/isolation; Chair Alarm; Bed Alarm;Multiple lines; Fall Risk;Pain;O2  (2L O2)   General   Family/Caregiver Present No   Cognition   Orientation Level Oriented X4   Memory Within functional limits   Following Commands Follows one step commands without difficulty   Comments Pt with increased fatigue this date, cooperative to participate in therapy session  RLE Assessment   RLE Assessment   (functionally > 3/5 )   LLE Assessment   LLE Assessment   (functionally > 3/5 )   Bed Mobility   Rolling R 3  Moderate assistance   Additional items Assist x 1;Bedrails; Increased time required;Verbal cues   Rolling L 3  Moderate assistance   Additional items Assist x 1;Bedrails; Increased time required;Verbal cues   Supine to Sit 3  Moderate assistance   Additional items Assist x 2;HOB elevated; Bedrails; Increased time required;Verbal cues;LE management   Sit to Supine 3  Moderate assistance   Additional items Assist x 2;HOB elevated; Increased time required;Verbal cues;LE management   Additional Comments Pt supine in bed upon arrival  Pt unable to sit EOB > 30seocnds 2* increased pain in lower abdomen - returned to supine in bed with bed alarm on and all needs within reach  Transfers   Sit to Stand Unable to assess   Stand to Sit Unable to assess   Additional Comments pt unable to tolerate sitting EOB 2* increased abdominal pain  PT to continue to follow and assess functional transfers      Balance   Static Sitting Fair -   Dynamic Sitting Poor +   Endurance Deficit   Endurance Deficit Yes   Endurance Deficit Description pain, fatigue   Activity Tolerance   Activity Tolerance Patient limited by fatigue;Patient limited by pain   Medical Staff Made Aware OT; co-eval performed this date 2* increased medical complexity and multiple comorbidities  Nurse Made Aware RN cleared pt to participate in therapy session    Assessment   Prognosis Good   Problem List Decreased strength;Decreased endurance; Impaired balance;Decreased mobility;Pain;Decreased skin integrity   Assessment Pt seen for high complexity PT evaluation  Pt with active PT eval/treat and up with assistance and up and OOB as tolerated orders  Pt is a 77 y o  female who was [facility] with left renal hematoma  Pt's active problems include: SIRS syndrome, obstructive uropathy, left pleural effusion, hx of C-diff, CKD with fistula, polycythemia  Pt  has a past medical history of Anxiety, Bowel obstruction ([address] ), Chronic kidney disease, Chronic kidney disease (CKD), stage IV (severe) ([address] ), Chronic thrombosis of subclavian vein ([address] ), Circulation problem, Compression fracture of cervical spine ([address] ), COVID-19 (07/2021), Hernia of abdominal cavity, History of kidney problems, History of transfusion, Hydronephrosis, Hypertension, IBS (irritable bowel syndrome), Incontinence, Lung mass, Polycythemia vera ([address] ), Pulmonary embolism ([address] ) (2014), Shingles, and Urinary tract infection  Pt resides alone in [facility] with 6 MARLON (bed/ bath upstairs) and was independent using SPC prior to hospital admission  Pt has limitations in strength, balance, endurance, and overall functional mobility  Pt requires assist of 1-2 for all mobility performed this date  Pt performed R/L rolling with mod Ax1  Pt performed sup <> sit and sit <> sup bed mobility tasks with mod Ax2; pt unable to tolerate sitting EOB 2* increased pain in left lower abdomen- pt requesting to lay back down and defer further mobilization   Pt was left supine in bed with alarm on at the end of PT session with all needs in reach  Pt would benefit from continued PT services while in hospital to address remaining limitations  PT to continue to follow pt and recommends post-acute rehab services after d/c  The patient's AM-PAC Basic Mobility Inpatient Short Form Low Function Raw Score 15 , Standardized Score is 23 9  A standardized score less 42 9 suggests the patient may benefit from discharge to post-acute rehab services  Please also refer to the recommendation of the Physical Therapist for safe discharge planning  Barriers to Discharge Inaccessible home environment;Decreased caregiver support   Goals   Patient Goals to rest   STG Expiration Date 10/25/21   Short Term Goal #1 STG 1  Pt will be able to perform bed mobility with mod I in order to improve overall functional mobility and assist in safe d/c  STG 2  Pt will be able to tolerate sitting EOB ~20 min at S level in order to improve trunk musculature and assist in functional transfers  STG 3  Pt will improve sitting/standing static/dynamic balance 1 grade in order to improve functional mobility and assist in safe d/c  STG 4  Pt will improve LE strength by one grade in order to improve functional mobility and assist in safe d/c  *PT to continue to follow and assess functional transfers and ambulation as appropriate and able  PT Treatment Day 0   Plan   Treatment/Interventions Patient/family training;LE strengthening/ROM; Bed mobility;Spoke to nursing;Spoke to case management;OT   PT Frequency Other (Comment)  (3-5x/wk )   Recommendation   PT Discharge Recommendation Post acute rehabilitation services   Equipment Recommended   (TBD )   PT - OK to Discharge Yes  (to rehab once medically cleared )   AM-PAC Basic Mobility Inpatient   Turning in Bed Without Bedrails 2   Lying on Back to Sitting on Edge of Flat Bed 1   Moving Bed to Chair 1   Standing Up From Chair 1   Walk in Room 1   Climb 3-5 Stairs 1   Basic Mobility Inpatient Raw Score 7 Turning Head Towards Sound 4   Follow Simple Instructions 4   Low Function Basic Mobility Raw Score 15   Low Function Basic Mobility Standardized Score 23 9           Caitlin Tamayo, PT, DPT

## 2021-10-11 NOTE — WOUND OSTOMY CARE
Consult Note - Wound   Abilio Faria 77 y o  female MRN: [id number]  Unit/Bed#: [facility] 929-01 Encounter: [id number]      History and Present Illness:  Patient is a 77year old female who present with complaints of abdominal pain  Patient found with hematoma or L kidney  She is awake, alert and oriented in bed with no complaints, patient needs assistance with turns and is incontinent of loose stool  Assessment Findings:   1-L ark with 2a skin kiara  Wound bed is drying with attached skin flap  2-Chest with category  3 skin tear  Heels are intact as well as sacrum and buttocks  Skin care plans:  1-Calazime to sacrum, buttocks TID and PRN  2-Hydraguard to bilateral heel BID and PRN  3-Elevate heels to offload pressure  4-Ehob cushion when out of bed  5-Turn/repoisiton q2h or when medically stable for pressure re-distribution on skin  6-Moisturize skin daily with skin nourishing cream   7-Cleanse L arm and chest skin tears with NSS, dermagran to wound bed, DSD and Summer to arm and Allevyn foam to chest wound  Change every other day  Vitals: Blood pressure 118/60, pulse 92, temperature 98 °F (36 7 °C), resp  rate 17, height 5' (1 524 m), weight 86 5 kg (190 lb 11 2 oz), SpO2 96 %, not currently breastfeeding  ,Body mass index is 37 24 kg/m²  Wound 09/30/21 Arm Right (Active)   Wound Image   10/11/21 1029   Wound Description Clean;Dry 10/11/21 1132   Wound Length (cm) 7 cm 10/11/21 1029   Wound Width (cm) 0 1 cm 10/11/21 1029   Wound Depth (cm) 0 cm 10/11/21 1029   Wound Surface Area (cm^2) 0 7 cm^2 10/11/21 1029   Wound Volume (cm^3) 0 cm^3 10/11/21 1029   Calculated Wound Volume (cm^3) 0 cm^3 10/11/21 1029       Wound 10/01/21 Skin Tear Arm Left (Active)   Wound Image   10/11/21 1028   Wound Description Dry; Intact; Clean 10/11/21 1132   Fina-wound Assessment Clean 10/10/21 2247   Wound Length (cm) 2 cm 10/11/21 1028   Wound Width (cm) 3 cm 10/11/21 1028   Wound Surface Area (cm^2) 6 cm^2 10/11/21 1028   Wound Site Closure Open to air 10/11/21 1132   Drainage Amount None 10/10/21 2247   Dressing Open to air 10/10/21 2247       Our skin care recommendations are placed as nursing orders, wound care to continue following, please call or tiger text with questions or concerns          Jeronimo Powell RN, BSN, [employer]

## 2021-10-11 NOTE — PLAN OF CARE
Problem: MOBILITY - ADULT  Goal: Maintain or return to baseline ADL function  Description: INTERVENTIONS:  -  Assess patient's ability to carry out ADLs; assess patient's baseline for ADL function and identify physical deficits which impact ability to perform ADLs (bathing, care of mouth/teeth, toileting, grooming, dressing, etc )  - Assess/evaluate cause of self-care deficits   - Assess range of motion  - Assess patient's mobility; develop plan if impaired  - Assess patient's need for assistive devices and provide as appropriate  - Encourage maximum independence but intervene and supervise when necessary  - Involve family in performance of ADLs  - Assess for home care needs following discharge   - Consider OT consult to assist with ADL evaluation and planning for discharge  - Provide patient education as appropriate  Outcome: Progressing  Goal: Maintains/Returns to pre admission functional level  Description: INTERVENTIONS:  - Perform BMAT or MOVE assessment daily    - Set and communicate daily mobility goal to care team and patient/family/caregiver  - Collaborate with rehabilitation services on mobility goals if consulted  - Perform Range of Motion 3 times a day  - Reposition patient every 2 hours    Outcome: Progressing     Problem: Potential for Falls  Goal: Patient will remain free of falls  Description: INTERVENTIONS:  - Educate patient/family on patient safety including physical limitations  - Instruct patient to call for assistance with activity   - Consult OT/PT to assist with strengthening/mobility   - Keep Call bell within reach  - Keep bed low and locked with side rails adjusted as appropriate  - Keep care items and personal belongings within reach  - Initiate and maintain comfort rounds  - Make Fall Risk Sign visible to staff  - Offer Toileting every 2 Hours, in advance of need  - Initiate/Maintain bed alarm    Outcome: Progressing     Problem: Prexisting or High Potential for Compromised Skin Integrity  Goal: Skin integrity is maintained or improved  Description: INTERVENTIONS:  - Identify patients at risk for skin breakdown  - Assess and monitor skin integrity  - Assess and monitor nutrition and hydration status  - Monitor labs   - Assess for incontinence   - Turn and reposition patient  - Assist with mobility/ambulation  - Relieve pressure over bony prominences  - Avoid friction and shearing  - Provide appropriate hygiene as needed including keeping skin clean and dry  - Evaluate need for skin moisturizer/barrier cream  - Collaborate with interdisciplinary team   - Patient/family teaching  - Consider wound care consult   Outcome: Progressing

## 2021-10-11 NOTE — OCCUPATIONAL THERAPY NOTE
Occupational Therapy Evaluation     Patient Name: Omari Campos  XPVJM'S Date: 10/11/2021  Problem List  Principal Problem:    Renal hematoma, left  Active Problems:    Chronic saddle pulmonary embolism (HCC)    Obstructive uropathy    Hypertension    Polycythemia vera ([address] )    Anemia due to stage 5 chronic kidney disease, not on chronic dialysis (HCC)    Sepsis ([address] )    Acute renal failure superimposed on stage 5 chronic kidney disease, not on chronic dialysis (HCC)    Pleural effusion    Localized swelling of right upper extremity    Past Medical History  Past Medical History:   Diagnosis Date    Anxiety     Bowel obstruction (HCC)     Chronic kidney disease     Chronic kidney disease (CKD), stage IV (severe) (HCC)     stage IV    [facility] subcl[facility] vein [facility])     right    Circulation problem     Compression fracture of cervical spine ([facility] )     COVID-19 07/2021    hospitalized     Hernia of abdominal cavity     History of kidney problems     History of transfusion     Hydronephrosis     Hypertension     IBS (irritable bowel syndrome)     Incontinence     Lung mass     Improving on PET/CT 1/2016    Polycythemia vera ([address] )     Pulmonary embolism ([facility] ) 2014    Shingles     Urinary tract infection      Past Surgical History  Past Surgical History:   Procedure Laterality Date    ABDOMINAL ADHESION SURGERY N/A 8/9/2020    Procedure: LYSIS ADHESIONS;  Surgeon: Lucy Vogt DO;  [facility];  Service: General    BLADDER SUSPENSION      BOTOX INJECTION N/A 7/27/2016    Procedure: BOTOX INJECTION ;  Surgeon: Macario Milligan MD;  [facility];  Service:     CHOLECYSTECTOMY N/A     COLONOSCOPY      CYSTECTOMY, RADICAL WITH ILEOCONDUIT N/A 10/4/2016    Procedure: Cristhian Moody ;  Surgeon: Macario Milligan MD;  [facility];  Service:     CYSTOSCOPY W/ RETROGRADES Bilateral 7/27/2016    Procedure: CYSTOSCOPY; RETROGRADE PYELOGRAM ;  Surgeon: Antonio Cantor MD;  [facility];  Service:     HERNIA REPAIR      IR AV FISTULAGRAM/GRAFTOGRAM  2/10/2021    IR NEPHROSTOMY TO NEPHROURETERAL STENT  5/15/2021    IR NEPHROSTOMY TO NEPHROURETERAL STENT  6/9/2021    IR NEPHROSTOMY TUBE CHECK AND/OR REMOVAL  6/16/2021    IR NEPHROSTOMY TUBE CHECK/CHANGE/REPOSITION/REINSERTION/UPSIZE  5/14/2021    IR NEPHROSTOMY TUBE CHECK/CHANGE/REPOSITION/REINSERTION/UPSIZE  5/21/2021    IR NEPHROSTOMY TUBE CHECK/CHANGE/REPOSITION/REINSERTION/UPSIZE  9/16/2021    IR NEPHROSTOMY TUBE CHECK/CHANGE/REPOSITION/REINSERTION/UPSIZE  10/4/2021    IR NEPHROSTOMY TUBE PLACEMENT  5/10/2021    IR NEPHROSTOMY TUBE PLACEMENT  9/20/2021    IR NON-TUNNELED CENTRAL LINE PLACEMENT  7/17/2020    IR NON-TUNNELED CENTRAL LINE PLACEMENT  8/14/2020    IR NON-TUNNELED CENTRAL LINE PLACEMENT  7/16/2021    LAPAROTOMY N/A 8/9/2020    Procedure: LAPAROTOMY EXPLORATORY, PARASTOMAL HERNIA REPAIR WITH MESH;  Surgeon: Edenilson Gamez DO;  [facility];  Service: General    AK ANASTOMOSIS,AV,ANY SITE Right 1/5/2021    Procedure: Creation of right brachiobasilic fistula; Surgeon: Kiana Chairez MD;  [facility];  Service: Vascular    AK COLONOSCOPY FLX DX W/COLLJ SPEC WHEN PFRMD N/A 8/31/2016    Procedure: COLONOSCOPY;  Surgeon: Megan Varghese MD;  Location: [facility]; Service: Gastroenterology    AK CYSTOSCOPY,INSERT URETERAL STENT Bilateral 7/27/2016    Procedure: STENT INSERTION; EXCISION OF MESH ;  Surgeon: Antonio Cantor MD;  [facility];  Service: Urology    AK REVISE AV FISTULA,W/O THROMBECTOMY Right 9/30/2021    Procedure: Superficialization and transposition of right brachiobasilic fistula;   Surgeon: Jessy Chairez MD;  [facility];  Service: Vascular    TONSILLECTOMY      TUBAL LIGATION      WISDOM TOOTH EXTRACTION             10/11/21 0959   OT Last Visit   OT Visit Date 10/11/21   Note Type   Note type Evaluation Restrictions/Precautions   Weight Bearing Precautions Per Order No   Other Precautions Contact/isolation; Chair Alarm; Bed Alarm; Fall Risk;Pain;Multiple lines;O2  (pt reports having RUE fistula)   Pain Assessment   Pain Assessment Tool 0-10   Pain Score Worst Possible Pain   Pain Location/Orientation Orientation: Left;Orientation: Lower; Location: Abdomen   Effect of Pain on Daily Activities 10/10 pain when moving    Hospital Pain Intervention(s) Repositioned; Ambulation/increased activity   Home Living   Type of Home House   Home Layout Two level;Bed/bath upstairs  (6STE)   Bathroom Shower/Tub Walk-in shower   Bathroom Toilet Standard   Bathroom Equipment Grab bars in shower; Shower chair   Home Equipment Walker;Cane  (primarily used [facility] PTA)   Prior Function   Level of [place] Independent with ADLs and functional mobility   Lives With Alone   Receives Help From [facility]   IAD Independent   Falls in the last 6 months 0   Vocational Retired   Lifestyle   Autonomy PTA, pt reports being I with ADLs/IADLs, fnxl mobility, (+)    Reciprocal Relationships Neighbor   Service to Others Retired   Intrinsic Gratification Watching TV   ADL   Where Assessed Supine, bed   Eating Assistance 5  Supervision/Setup   Grooming Assistance 4  [address] [facility] 4  [facility] 2  Maximal [address] 2  Maximal [address]  2  Maximal Assistance   Bed Mobility   Rolling R 3  Moderate assistance   Additional items Assist x 1;Bedrails; Increased time required   Rolling L 3  Moderate assistance   Additional items Assist x 1; Increased time required; Bedrails   Supine to Sit 3  Moderate assistance   Additional items Assist x 2; Increased time required;HOB elevated   Sit to Supine 3  Moderate assistance   Additional items Assist x 2;HOB elevated; Increased time required   Transfers   Sit to Stand Unable to assess   Stand to Sit Unable to assess   Additional Comments Pt with increased abd pain when moving to EOB  Unable to tolerate sitting position at this time, requesting to return to supine  Balance   Static Sitting Fair -   Dynamic Sitting Poor   Activity Tolerance   Activity Tolerance Patient limited by fatigue;Patient limited by pain   Medical Staff Made Aware PT Marilynn Castellon    Nurse Made Aware RN clearance for session   RUE Assessment   RUE Assessment   (3+/5)   LUE Assessment   LUE Assessment   (3+/5)   Hand Function   Gross Motor Coordination Functional   Fine Motor Coordination Functional   Sensation   Light Touch No apparent deficits   Vision - Complex Assessment   Ocular Range of Motion WFL   Head Position WDL   Tracking Able to track stimulus in all quads without difficulty   Perception   Inattention/Neglect Appears intact   Cognition   Arousal/Participation Responsive; Cooperative   Attention Attends with cues to redirect   Orientation Level Oriented X4   Memory Within functional limits   Following Commands Follows one step commands without difficulty   Comments Pt pleasant during session, noted to have increased fatigue    Assessment   Limitation Decreased ADL status; Decreased UE ROM; Decreased UE strength;Decreased endurance;Decreased self-care trans;Decreased high-level ADLs   Prognosis Fair   Assessment Pt is a 77 y o  female admitted [facility] on 10/[facility]/2021 w/ Renal hematoma, left  Recent RUE fistula placement  has a past medical history of Anxiety, Bowel obstruction, CKD, Chronic thrombosis of subclavian vein, Circulation problem, Compression fracture of cervical spine, COVID-19 (07/2021), Hernia of abdominal cavity, Hydronephrosis, Hypertension, IBS, Incontinence, Lung mass, Polycythemia vera, PE, Shingles, and UTI  Pt with active OT orders and up and OOB as tolerated orders   Pt seen as a co-evaluation with PT due to the patient's co-morbidities, clinically unstable presentation/clinical complexity, and present impairments  As per pt report, pta, resides in a 2STH, 6STE, alone  Pt was I w/  ADLS and IADLS, (+) drove  Upon evaluation, pt currently requires MOD A x 1 to roll, MOD A x 2 sup <> sit  Pt with increased L abdominal pain with movement to EOB and unable to tolerate upright sitting position at this time  Exhibits decreased strength to B/L UE's limiting performance in ADLs/transfers  Pt currently requires S eating, MIN A grooming, MIN A UB ADLs, MAX A LB ADLs, and MAX A toileting  Pt is limited at this time 2*: pain, endurance, activity tolerance, functional mobility, balance, functional standing tolerance, unsupportive home environment, decreased I w/ ADLS/IADLS and strength  The following Occupational Performance Areas to address include: grooming, bathing/shower, toilet hygiene, dressing, health maintenance, functional mobility, community mobility and clothing management  Pt to benefit from immediate acute skilled OT to address above deficits, improve overall functional independence, maximize fnxl mobility and reduce caregiver burden  From OT standpoint, recommendation at time of d/c would be STR  Pt was left in bed with alarm on after session with all current needs met  The patient's raw score on the AM-PAC Daily Activity inpatient short form is 16, standardized score is 35 96, less than 39 4  Patients at this level are likely to benefit from discharge to post-acute rehabilitation services  Please refer to the recommendation of the Occupational Therapist for safe discharge planning  Goals   Patient Goals to lay down    LTG Time Frame 10-14   Plan   Treatment Interventions ADL retraining;Functional transfer training;UE strengthening/ROM; Endurance training;Patient/family training;Equipment evaluation/education; Compensatory technique education;Continued evaluation; Activityengagement; Energy conservation   Goal Expiration Date 10/25/21   OT Frequency 3-5x/wk Recommendation   OT Discharge Recommendation Post acute rehabilitation services   OT - OK to Discharge Yes  (to rehab when medically stable )   AM-PAC Daily Activity Inpatient   Lower Body Dressing 2   Bathing 2   Toileting 2   Upper Body Dressing 3   Grooming 3   Eating 4   Daily Activity Raw Score 16   Daily Activity Standardized Score (Calc for Raw Score >=11) 35 96   AM-PAC Applied Cognition Inpatient   Following a Speech/Presentation 3   Understanding Ordinary Conversation 4   Taking Medications 4   Remembering Where Things Are Placed or Put Away 4   Remembering List of 4-5 Errands 3   Taking Care of Complicated Tasks 3   Applied Cognition Raw Score 21   Applied Cognition Standardized Score 44 3     GOALS    - Pt will complete UB dressing/self care/bathing w/ mod I using adaptive device and DME as needed    - Pt will complete LB dressing/self care/bathing w/ mod I using adaptive device and DME as needed    - Pt will complete toileting w/ mod I w/ G hygiene/thoroughness using DME as needed    - Pt will demonstrate G carryover of pt/caregiver education and training as appropriate  - Pt will demonstrate 100% carryover of energy conservation techniques t/o functional I/ADL/leisure tasks w/o cues s/p skilled education    - Pt will engage in ongoing cognitive assessment w/ G participation to assist w/ safe d/c planning/recommendations    - Pt will increase static and dynamic standing/sitting balance to F+ using AD/DME as needed to increase safety and I during fnxl transfers and ADLs    - Pt will maintain upright sitting position for at least 20 min during dynamic fnxl activity with G balance and endurance to improve ADL performance and independence, as well as reduce risk of falls         ** OTR to continue to assess fnxl transfers/mobility and establish goals as appropriate        Dedra Lainez MS, OTR/L

## 2021-10-11 NOTE — PROGRESS NOTES
Hgb from 7 2 to 6 7 to 6 9 on recheck  VSS on 2L  Patient offers no complaints other than having diarrhea, c diff pending  No blood in stool  Per RN who had patient the night prior as well abdominal exam unchanged  Ongoing pain and tenderness to palpation, no increase tonight  Also ecchymosis and hyperpigmentation of left flank unchanged  Noted ecchymosis of LUE as well  Will outline to monitor for progression  Plan to transfuse 1 unit PRBC  Consent obtained  Monitor hemodynamics and response to transfusion, consider imaging if patient declines  Notify of change

## 2021-10-11 NOTE — PLAN OF CARE
Problem: OCCUPATIONAL THERAPY ADULT  Goal: Performs self-care activities at highest level of function for planned discharge setting  See evaluation for individualized goals  Description: Treatment Interventions: ADL retraining, Functional transfer training, UE strengthening/ROM, Endurance training, Patient/family training, Equipment evaluation/education, Compensatory technique education, Continued evaluation, Activityengagement, Energy conservation          See flowsheet documentation for full assessment, interventions and recommendations  Note: Limitation: Decreased ADL status, Decreased UE ROM, Decreased UE strength, Decreased endurance, Decreased self-care trans, Decreased high-level ADLs  Prognosis: Fair  Assessment: Pt is a 77 y o  female admitted to [facility] on 10/9/2021 w/ Renal hematoma, left  Recent RUE fistula placement  has a past medical history of Anxiety, Bowel obstruction, CKD, Chronic thrombosis of subclavian vein, Circulation problem, Compression fracture of cervical spine, COVID-19 (07/2021), Hernia of abdominal cavity, Hydronephrosis, Hypertension, IBS, Incontinence, Lung mass, Polycythemia vera, PE, Shingles, and UTI  Pt with active OT orders and up and OOB as tolerated orders  Pt seen as a co-evaluation with PT due to the patient's co-morbidities, clinically unstable presentation/clinical complexity, and present impairments  As per pt report, pta, resides in a 2STH, 6STE, alone  Pt was I w/  ADLS and IADLS, (+) drove  Upon evaluation, pt currently requires MOD A x 1 to roll, MOD A x 2 sup <> sit  Pt with increased L abdominal pain with movement to EOB and unable to tolerate upright sitting position at this time  Exhibits decreased strength to B/L UE's limiting performance in ADLs/transfers  Pt currently requires S eating, MIN A grooming, MIN A UB ADLs, MAX A LB ADLs, and MAX A toileting   Pt is limited at this time 2*: pain, endurance, activity tolerance, functional mobility, balance, functional standing tolerance, unsupportive home environment, decreased I w/ ADLS/IADLS and strength  The following Occupational Performance Areas to address include: grooming, bathing/shower, toilet hygiene, dressing, health maintenance, functional mobility, community mobility and clothing management  Pt to benefit from immediate acute skilled OT to address above deficits, improve overall functional independence, maximize fnxl mobility and reduce caregiver burden  From OT standpoint, recommendation at time of d/c would be STR  Pt was left in bed with alarm on after session with all current needs met  The patient's raw score on the -PAC Daily Activity inpatient short form is 16, standardized score is 35 96, less than 39 4  Patients at this level are likely to benefit from discharge to post-acute rehabilitation services  Please refer to the recommendation of the Occupational Therapist for safe discharge planning       OT Discharge Recommendation: Post acute rehabilitation services  OT - OK to Discharge: Yes (to rehab when medically stable )

## 2021-10-11 NOTE — PROGRESS NOTES
Progress Note - Infectious Disease   Toni Amen 77 y o  female MRN: [id number]  Unit/Bed#: [facility] 929-01 Encounter: [id number]      Impression/Plan:   1  SIRS syndrome, possible sepsis: Patient presented with worsening abdominal pain and flank pain  Found to be febrile and tachycardic on arrival  No leukocytosis but elevated procalcitonin which has since trended up to >78  Given concern for secondary infection from problem #2 and thus patient empirically started on ceftriaxone  Blood cultures collected and current growing gram negative anaerobic rods  Antibiotics changed to cefepime to broaden gram negative coverage for polymicrobials  Given clinical examination and stabilization of VS will hold off on initiation of anaerobic coverage but low threshold to initiate  Continue on IV Cefepime while awaiting cultures   Follow up blood cultures; awaiting speciation   Follow up urine cultures    Repeat labs tomorrow   Monitor WBC and trend fever curve    Begin on anaerobic coverage with flagyl if signs of decompensation or recurrent fevers    Monitor abdominal exam and low threshold to repeat imaging if pain worsens   Urology and nephrology teams consulted   Supportive care per primary team     2  Left perinephric hematoma: Patient presenting with worsening flank and abdominal pain  CT A/P demonstrating a hematoma in the left renal fosa with some bubbles of gas and extension of hemorrhage/thickening suggesting possible hemoperitoneum  Recently underwent left PCN removal due to problem #3 and thus likely cause of the bleed, especially considering use of chronic AC  Antibiotics as above    Closely monitor hemoglobin    Monitor abdominal exam and low threshold to repeat imaging if pain worsens    IR consulted and appreciate input - no plan for intervention    Urology consulted and appreciate input - no plan for intervention   Transfusion and supportive care per primary team      3   Obstructive Uropathy: History of cystectomy with ileal conduit creatine in 2016 due to increased filling pressured and nonfunctional bladder leading to obstructive uropathy and b/l hydronephrosis  Recently underwent conversion to retrograde nephroureterostomy tube on 9/20/2021 and left PCN removal on 10/5  Urology consulted     4  Left pleural effusion: CXR demonstrating small infiltrate and atelectasis in the left medial lung base along with a small left sided effusion  Asymptomatic with stable O2 saturations on room air  Antibiotics as above   Monitor WBC and trend fever curve    IR consulted for thoracentesis     5  History of C difficile infection: History of C diff infection back in 2019 with negative PCR from July 2021  Patient afebrile with no leukocytosis  Procalcitonin elevated at >78  C  Difficile studies pending  Monitor stool output    Strict contact and hand hygiene precautions while awaiting results   No indication to immediately begin PO vancomycin    Monitor WBC and trend fever curve     6   Chronic kidney disease with fistula: History of CKD5 nearing HD  Fistula present in the RUE with evidence of significant swelling  Antibiotics renally adjusted to cefepime 1000 mg q24 hours   Repeat labs tomorrow   Nephrology consulted and appreciate intpuat   Vascular surgery following    Avoid PICC line if possible     7  Polycythemia vera with MDS and Anemia: Follow with hematology/oncology as an outpatient and is on Jakafi  Worsening anemia noted  Likely superimposed acute blood loss anemia from left perinephric hematoma and CKD  Transfusions and work up per primary medicine team   Oncology team consulted     Closely monitor hemodynamics    Continue to clinically monitor    Anticoagulation per primary team     9  History of PE: History of chronic saddle pulmonary embolus  Chronically on Eliquis 2 5 mg BID at home  Anticoagulation per primary team     Thank you for involving us in the plan of care for Ms Maricel Freitas   Plan to be discussed with infectious disease attending  Lines/Drains:  · Left triple femoral line   · Urostomy ileal conduit RUQ    Antibiotics:  · Total Antibiotics Days: 2  · Ceftriaxone - Started 10/10; discontinued 10/11  · Cefepime 1000 mg q24 hours - Started 10/11; Day #1    Subjective:  Patient has no fever, chills, sweats; no nausea, vomiting, diarrhea; no cough, shortness of breath; no pain  Poor sleep overnight with no recurrent febrile episodes  Nursing and patient both admit to continued episodes of copious yellow/brown loose stool  Not - foul smelling  Patient admits to not eating due to fear that it will cause additional BM  Continue to complain of left flank and generalized abdominal pain most pronounced in the LUE  Objective:  Vitals:  Temp:  [97 6 °F (36 4 °C)-98 4 °F (36 9 °C)] 98 °F (36 7 °C)  HR:  [83-94] 94  Resp:  [17-20] 17  BP: (106-137)/(45-75) 111/45  SpO2:  [95 %-97 %] 96 %  Temp (24hrs), Av °F (36 7 °C), Min:97 6 °F (36 4 °C), Max:98 4 °F (36 9 °C)  Current: Temperature: 98 °F (36 7 °C)    Physical Exam:   General Appearance:  Alert, interactive, nontoxic, no acute distress  Throat: Oropharynx moist without lesions  Lungs:   Good respiratory effort with no accessory muscle use  Saturating appropriately on RA  Decreased breath sounds in bilateral lung bases; no wheezes, rhonchi or rales; respirations unlabored   Heart:  RRR; no murmur, rub or gallop   Abdomen:   Soft and nondistended abdomen with hyperactive bowel sounds  Pain with light palpation diffusely but most severe in LUQ  No rebound or guarding noted  Extremities: Large area of ecchymosis on the RUE and mild bruising on the LUE  Left femoral line in place with no signs of surrounding erythema, infection, or drainage  Genitourinary Urostomy ileal conduit in place draining clear yellow urine  Skin: No new rashes or lesions  No draining wounds noted         Labs, Imaging, & Other studies:   All pertinent labs and imaging studies were personally reviewed  Results from last 7 days   Lab Units 10/11/21  0337 10/11/21  0028 10/10/21  1647 10/10/21  0621 10/09/21  1447   WBC Thousand/uL 4 85  --   --  4 97 6 15   HEMOGLOBIN g/dL 6 9* 6 7* 7 2* 7 1* 9 3*   PLATELETS Thousands/uL 212  --   --  187 276     Results from last 7 days   Lab Units 10/11/21  0337 10/10/21  0621 10/09/21  1447   SODIUM mmol/L 141 138 138   POTASSIUM mmol/L 3 0* 3 8 3 2*   CHLORIDE mmol/L 113* 109* 110*   CO2 mmol/L 20* 18* 16*   BUN mg/dL 51* 44* 37*   CREATININE mg/dL 3 55* 3 60* 3 26*   EGFR ml/min/1 73sq m 12 12 13   CALCIUM mg/dL 7 9* 8 5 9 3   AST U/L  --  72* 63*   ALT U/L  --  15 11*   ALK PHOS U/L  --  81 128*     Results from last 7 days   Lab Units 10/09/21  1447 10/09/21  1444   BLOOD CULTURE  Gram-Negative Rods Anaerobic (Group)* Gram-Negative Rods Anaerobic (Group)*   GRAM STAIN RESULT  Gram negative rods* Gram negative rods*     Results from last 7 days   Lab Units 10/10/21  0621 10/09/21  1447   PROCALCITONIN ng/ml 78 78* 21 02*         Results from last 7 days   Lab Units 10/10/21  0621   FERRITIN ng/mL 1,650*  1,668*

## 2021-10-11 NOTE — SEDATION DOCUMENTATION
US guided left thoracentesis completed for 45cc nikos fluid in IR by Dr Cheryl Barr to site CDI  Labs sent as ordered

## 2021-10-11 NOTE — ASSESSMENT & PLAN NOTE
Hx of Polycythemia vera and MDS  Significant anemia secondary to blood loss in the past   The patient is currently off of the [facility] treatment for her PV and getting mainly Aranesp on every 28 day basis  Hematology consulted for evaluation, recommend once patient is better/ stable, discharge, she will follow-up with Dr Rosalind Wayne,  her primary hematologist

## 2021-10-11 NOTE — ASSESSMENT & PLAN NOTE
-Sepsis, POA   Patient presented with progressing flank/abdominal discomfort likely due to Left renal hematoma with chronic obstructive uropathy  -Presented with temp 102 4, tachycardia, tachypnea and elevated pro calcitonin possibly suggesting urinary source of infection  -ID is on board  -Vanco discontinued by ID continue with ceftriaxone on 10/10   -Blood culture growing fusobacterium  -urine culture growing E coli  -her ID recommendations ceftriaxone was discontinued today  Patient started on cefepime and Flagyl   -Spoke with IR regarding obtaining central venous access, IR to evaluate patient today  Remove femoral central line once new access achieved    -C  Diff was ordered in setting of diarrhea, which was negative   Diarrhea probably 2/2 abx use

## 2021-10-11 NOTE — PROCEDURES
PICC Line Insertion    Date/Time: 10/11/2021 7:20 PM  Performed by: Arturo Lynn RN  Authorized by: Rojas Hammonds DO     Patient location:  IR  Consent:     Consent obtained:  Written    Consent given by:  Patient    Risks discussed:  Arterial puncture, incorrect placement, nerve damage, bleeding and infection    Alternatives discussed:  No treatment and delayed treatment  Universal protocol:     Procedure explained and questions answered to patient or proxy's satisfaction: yes      Relevant documents present and verified: yes      Test results available and properly labeled: yes      Radiology Images displayed and confirmed  If images not available, report reviewed: yes      Required blood products, implants, devices, and special equipment available: yes      Site/side marked: yes      Immediately prior to procedure, a time out was called: yes      Patient identity confirmed:  Verbally with patient, arm band and provided demographic data  Pre-procedure details:     Hand hygiene: Hand hygiene performed prior to insertion      Sterile barrier technique: All elements of maximal sterile technique followed      Skin preparation:  Betadine    Skin preparation agent: Skin preparation agent completely dried prior to procedure    Indications:     PICC line indications: new site and no peripheral vascular access    Anesthesia (see MAR for exact dosages):      Anesthesia method:  Local infiltration    Local anesthetic:  Lidocaine 1% w/o epi  Procedure details:     Location:  Internal jugular    Vessel type: vein      Laterality:  Right    Approach: percutaneous technique used      Patient position:  Flat    Procedural supplies:  Double lumen    Catheter size:  5 Fr    Ultrasound guidance: yes      Ultrasound image availability:  Images available in PACS    Sterile ultrasound techniques: Sterile gel and sterile probe covers were used      Number of attempts:  1    Successful placement: yes      Total catheter length (cm):  17    Catheter out on skin (cm):  0    Max flow rate:  999    Arm circumference:  N/a  Post-procedure details:     Post-procedure:  Dressing applied, line sutured and securement device placed    Assessment:  Blood return through all ports, no pneumothorax on x-ray, placement verified by x-ray and free fluid flow    Post-procedure complications: none      Patient tolerance of procedure:   Tolerated well, no immediate complications    Observer: Yes      Observer name:  Buddy Hernandez

## 2021-10-11 NOTE — ASSESSMENT & PLAN NOTE
Obstructive nephropathy  Chronic bilateral hydronephrosis stents were recently removed  Urology on board

## 2021-10-11 NOTE — ASSESSMENT & PLAN NOTE
Left renal hematoma:   Presented with left renal hematoma  Pigtail catheter is noted medial to kidney  Renal pelvis may be collapsed  Hemorrhage and thickening extending in the combined interfascial place of retrospective as well as some high density fluid within     Urology on board  No plan for intervention per urology at this time  Observation and treat the infection  Hg overnight was 6 9 ordered 1 unit PRBC, improved to 8 5    Low threshold for repeat imaging if pain or anemia worsens/progresses

## 2021-10-11 NOTE — UTILIZATION REVIEW
Initial Clinical Review    Admission: Date/Time/Statement:   Admission Orders (From admission, onward)     Ordered        10/09/21 818 [facility]  Once                   Orders Placed This Encounter   Procedures    INPATIENT ADMISSION     Standing Status:   Standing     Number of Occurrences:   1     Order Specific Question:   Level of Care     Answer:   Med Surg [16]     Order Specific Question:   Estimated length of stay     Answer:   More than 2 Midnights     Order Specific Question:   Certification     Answer:   I certify that inpatient services are medically necessary for this patient for a duration of greater than two midnights  See H&P and MD Progress Notes for additional information about the patient's course of treatment  ED Arrival Information     Expected Arrival Acuity    - 10/9/2021 14:18 Urgent         Means of arrival Escorted by Service Admission type    Ambulance [facility] [address] [facility] Hospitalist Urgent         Arrival complaint    abdominal pain        Chief Complaint   Patient presents with    Abdominal Pain     LLQ abdominal pain, recent nephrostomy removal, fever, recent ALICIA fistula placed with swelling noted  Initial Presentation:   Ms Winifred Ortiz is a 76 yo female who presents to the ED via EMS from home with c/o abd pain, activity change, fever, fatigue, cough, SOB, watery diarrhea, N/V  Pt has urostomy tube in anterior abd  PMH: CKD stage 5 not on HD, polycythemia vera, HTN, obstructive uropathy, hydronephrosis with stent now s/p stent removal,  chronic saddle PE  In the ED imaging showed hematoma L kidney, Pigtail cath is medial to kidney, renal pelvis may be collapsed  She is septic  Urine shows large blood, leukocytes, no nitrates  She is admitted to INPATIENT status with L renal hematoma - consults to Urology and IR  Sepsis - fever to 102 4 with tachycardia and tachypnea, elevated Procalcitonin,  follow blood and urine cultures, IV fluids, IV antibiotics    ARF on CKD 5 - avoid nephrotoxics, Nephrology consult  Polycythemia vera - hold Jakafi, consider heme-onc if hgb drops  HTN - Metoprolol  Obstructive uropathy - IR consult  Chronic saddle PE - Eliquis, on hold d/t IR intervention  10/9  IR Consult - no role for embolization as there is no evidence of active bleeding  It is likely that this is old blood  Drain placement into thick blood products is difficult as these drain slowly and there is persistent risk of new hemorrhage in a vascular bed  Blood may be seeded by the colonized bacteria of her conduit  Recommend broad-spectrum antibiotics and close follow-up  No IR intervention indicated at this time  Date: 10/10   Day 2:   Blood cultures growing gram neg rods,  May get thoracentesis for pleural effusion, pt is fatigued and c/o LLQ abd pain today  10/10 Vascular Surgery Consult - consult for arm swelling on side of AVF  s/p RUE BVT on 9/30/21 with right upper extremity swelling, likely secondary to venous hypertension  Obtain HD Duplex, pt needs arm elevated above level of the heart as well as compression       10/10 ID Consult - SIRS and possible Sepsis - progressing flank/abdominal discomfort likely due L renal hematoma with chronic obstructive uropathy - d/c IV Vanco and continue Ceftriaxone, trend fever curve, repeat Urine culture, follow blood and urine cultures  10/10 Urology Consult - managed for the last several months with nephrostomy tube and nephroureteral catheter  On 10/04, she was converted to nephroureteral catheter exiting through her stoma, and the left nephrostomy tube was removed  She return to the hospital emergency department with left flank pain as well as fever and diarrhea  She also has history of chronic kidney disease stage 5 and recently had a right upper extremity AV fistula placed in anticipation of hemodialysis  Additionally has history of previous C diff infection    She did have fever on presentation without leukocytosis  Since admission she has been afebrile  CT revealed abnormal appearance of left kidney consistent with hematoma  Left nephroureteral stent is in good position  Of note patient is also anticoagulated due to history of PE - no intervention at this time  Urine is clear and draining well  Date: 10/11   Day 3:  Pt had drop in Hgb from 7 2 to 6 7 - transfused with 1 unit PRBCs  Repeat H/H 8 5/27  1  Continues with abd pain and tenderness on palpation, there is some ecchymosis to LUE        ED Triage Vitals [10/09/21 1427]   Temperature Pulse Respirations Blood Pressure SpO2   97 8 °F (36 6 °C) (!) 114 20 150/67 94 %      Temp Source Heart Rate Source Patient Position - Orthostatic VS BP Location FiO2 (%)   Oral -- -- -- --      Pain Score       No Pain          Wt Readings from Last 1 Encounters:   10/09/21 86 5 kg (190 lb 11 2 oz)     Additional Vital Signs:   10/11/21 1127  --  92  --  118/60  --  --  --  --  --   10/11/21 0942  --  94  --  111/45Abnormal   --  --  --  --  --   10/11/21 09:38:43  98 °F (36 7 °C)  94  --  111/45Abnormal   67  96 %  --  --  --   10/11/21 07:22:38  98 °F (36 7 °C)  90  --  123/66  85  95 %  --  --  --   10/11/21 0700  --  90  --  124/67  --  --  --  --  --   10/11/21 0653  98 4 °F (36 9 °C)  92  17  137/75  --  --  --  --  --   10/11/21 06:22:06  98 4 °F (36 9 °C)  94  17  137/75  96  97 %  --  --  --   10/11/21 03:46:09  97 6 °F (36 4 °C)  84  18  107/53  71  95 %  --  --  --   10/10/21 23:16:29  97 8 °F (36 6 °C)  83  20  109/59  76  96 %  --  --  --   10/10/21 23:13:48  97 8 °F (36 6 °C)  87  20  109/59  76  96 %  --  --  --   10/10/21 14:54:53  97 9 °F (36 6 °C)  89  17  106/61  76  95 %  --  --  --   10/10/21 0828  --  --  --  --  --  95 %  --  --  Nasal cannula   10/10/21 08:12:22  97 8 °F (36 6 °C)  --  --  106/61  76  --  --  --  --   10/10/21 03:41:23  97 7 °F (36 5 °C)  --  20  104/63  77  --  --  --  --   10/10/21 03:40:52  97 7 °F (36 5 °C)  -- 20  104/63  77  --  --  --  --   10/09/21 22:15:26  97 8 °F (36 6 °C)  91  22  115/64  81  96 %  --  --  --   10/09/21 2020  --  --  --  --  --  --  28  2 L/min  Nasal cannula   10/09/21 1941  97 7 °F (36 5 °C)  91  22  111/62  --  97 %  --  --  --   10/09/21 1700  --  100  24Abnormal   135/60  87  98 %  --  --  --   10/09/21 1530  --  106Abnormal   26Abnormal   107/57  76  92 %  --  --  --     10/09/21 1446  102 4 °F (39 1 °C)Abnormal   --  --  --  --  --  --  --  --     Pertinent Labs/Diagnostic Test Results:     10/9 CT AP - In the left renal fossa, there is a collection of mixed intermediate and high attenuation suggesting a hematoma with some bubbles of gas  Pigtail catheter is noted medial to the kidney  The renal pelvis may be collapsed  There appears to be some hemorrhage and thickening extending in the combined interfascial plane of the retroperitoneum as well as some high density fluid within the pelvis suggesting hemoperitoneum  Superinfection of the kidney is possible  Urology evaluation is advised  Nonobstructing calculi in the right kidney  Some prominent loops of bowel are more likely large bowel suggesting ileus  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     10/9 CXR - Medial left base slight atelectasis and or small infiltrate  Small left effusion  10/9 ECG - Sinus tachycardia with occasional Premature ventricular complexes    10/10 HD access duplex RUE -  Impression:  There is a >50% stenosis noted in the proximal subclavian vein  There is a >50% stenosis noted the proximal basilic vein, which also torturous  The dialysis AVF appears to be matured with adequate diameter, flow volumes and less than 6mm in depth throughout the arm  Triphasic waveforms noted at the wrist with antegrade high resistive flow noted in the radial and ulnar arteries  No evidence of inflow obstruction    No evidence of a pseudoaneurysm  No evidence of a large venous branch  Unable to identify the right IJV       Results from last 7 days   Lab Units 10/11/21  1059 10/11/21  0337 10/11/21  0028 10/10/21  1647 10/10/21  0621 10/09/21  1447   WBC Thousand/uL  --  4 85  --   --  4 97 6 15   HEMOGLOBIN g/dL 8 5* 6 9* 6 7* 7 2* 7 1* 9 3*   HEMATOCRIT % 27 1* 22 0* 21 5* 23 0* 23 2* 29 8*   PLATELETS Thousands/uL  --  212  --   --  187 276   BANDS PCT %  --  4  --   --   --  8         Results from last 7 days   Lab Units 10/11/21  0337 10/10/21  0621 10/09/21  1447   SODIUM mmol/L 141 138 138   POTASSIUM mmol/L 3 0* 3 8 3 2*   CHLORIDE mmol/L 113* 109* 110*   CO2 mmol/L 20* 18* 16*   ANION GAP mmol/L 8 11 12   BUN mg/dL 51* 44* 37*   CREATININE mg/dL 3 55* 3 60* 3 26*   EGFR ml/min/1 73sq m 12 12 13   CALCIUM mg/dL 7 9* 8 5 9 3   MAGNESIUM mg/dL 1 7 1 7  --    PHOSPHORUS mg/dL 5 0* 4 9*  --      Results from last 7 days   Lab Units 10/10/21  0621 10/09/21  1447   AST U/L 72* 63*   ALT U/L 15 11*   ALK PHOS U/L 81 128*   TOTAL PROTEIN g/dL 6 3* 6 4   ALBUMIN g/dL 3 0* 2 1*   TOTAL BILIRUBIN mg/dL 1 60* 1 26*         Results from last 7 days   Lab Units 10/11/21  0337 10/10/21  0621 10/09/21  1447   GLUCOSE RANDOM mg/dL 93 107 100     Results from last 7 days   Lab Units 10/09/21  1447   TROPONIN I ng/mL 0 09*         Results from last 7 days   Lab Units 10/11/21  0337 10/09/21  1447   PROTIME seconds 21 7* 18 6*   INR  2 00* 1 63*   PTT seconds 46* 35         Results from last 7 days   Lab Units 10/10/21  0621 10/09/21  1447   PROCALCITONIN ng/ml 78 78* 21 02*     Results from last 7 days   Lab Units 10/09/21  1447   LACTIC ACID mmol/L 1 7     Results from last 7 days   Lab Units 10/09/21  1447   NT-PRO BNP pg/mL 9,655*     Results from last 7 days   Lab Units 10/10/21  0621   FERRITIN ng/mL 1,650*  1,668*         Results from last 7 days   Lab Units 10/09/21  1447   LIPASE u/L 84     Results from last 7 days   Lab Units 10/10/21  1256 CLARITY UA  Turbid   COLOR UA  Dk Yellow   SPEC GRAV UA  1 018   PH UA  8 5*   GLUCOSE UA mg/dl Negative   KETONES UA mg/dl Negative   BLOOD UA  Large*   PROTEIN UA mg/dl 100 (2+)*   NITRITE UA  Negative   BILIRUBIN UA  Negative   UROBILINOGEN UA E U /dl 0 2   LEUKOCYTES UA  Moderate*   WBC UA /hpf 20-30*   RBC UA /hpf 20-30*   BACTERIA UA /hpf Moderate*   EPITHELIAL CELLS WET PREP /hpf Occasional     Results from last 7 days   Lab Units 10/09/21  1447 10/09/21  1444   BLOOD CULTURE  Gram-Negative Rods Anaerobic (Group)* Gram-Negative Rods Anaerobic (Group)*   GRAM STAIN RESULT  Gram negative rods* Gram negative rods*     ED Treatment:   Medication Administration from 10/09/2021 1418 to 10/09/2021 1927    Date/Time Order Dose Route Action   10/09/2021 1822 multi-electrolyte (ISOLYTE-S PH 7 4) bolus 500 mL 500 mL Intravenous New Bag   10/09/2021 1831 ceftriaxone (ROCEPHIN) 1 g/50 mL in dextrose IVPB 1,000 mg Intravenous New Bag        Past Medical History:   Diagnosis Date    Anxiety     Bowel obstruction (HCC)     Chronic kidney disease     Chronic kidney disease (CKD), stage IV (severe) (HCC)     stage IV    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    Circulation problem     Compression fracture of cervical spine ([facility] )     COVID-19 07/2021    hospitalized     Hernia of abdominal cavity     History of kidney problems     History of transfusion     Hydronephrosis     Hypertension     IBS (irritable bowel syndrome)     Incontinence     Lung mass     Improving on PET/CT 1/2016    Polycythemia vera ([address] )     Pulmonary embolism ([address] ) 2014    Shingles     Urinary tract infection      Present on Admission:   Acute renal failure superimposed on stage 5 chronic kidney disease, not on chronic dialysis ([address] )   Chronic saddle pulmonary embolism (HCC)   Hypertension   Obstructive uropathy   Polycythemia vera ([address] )   Renal hematoma, left   Localized swelling of right upper extremity    Admitting Diagnosis: Abdominal pain [R10 9]  Renal hematoma [S37 019A]  Sepsis ([facility] ) [A41 9]     Age/Sex: 77 y o  female     Admission Orders:  Scheduled Medications:  acetaminophen, 975 mg, Oral, Q8H LONG  atorvastatin, 40 mg, Oral, HS  calcitriol, 0 25 mcg, Oral, Daily  cefepime, 1,000 mg, Intravenous, Q12H  cholecalciferol, 800 Units, Oral, Daily  citalopram, 20 mg, Oral, Daily  docusate sodium, 100 mg, Oral, BID  levothyroxine, 75 mcg, Oral, Daily  magnesium sulfate, 2 g, Intravenous, Once  melatonin, 3 mg, Oral, HS  metoprolol tartrate, 25 mg, Oral, BID  saccharomyces boulardii, 250 mg, Oral, Daily  senna, 1 tablet, Oral, Daily  sodium bicarbonate, 650 mg, Oral, BID after meals      Continuous IV Infusions:       PRN Meds:  acetaminophen, 650 mg, Oral, Q6H PRN  aluminum-magnesium hydroxide-simethicone, 30 mL, Oral, Q6H PRN  fentanyl citrate (PF), 25 mcg, Intravenous, Q2H PRN  ondansetron, 4 mg, Intravenous, Q6H PRN    OOB with assist   Ace wrap to RUE and elevate   Transfusion parameters  H/H q 8 HR  Thoracentesis  INPATIENT CONSULT TO IR  IP CONSULT TO UROLOGY  IP CONSULT TO PHARMACY  IP CONSULT TO INFECTIOUS DISEASES  IP CONSULT TO NEPHROLOGY  IP CONSULT TO HEMATOLOGY  INPATIENT CONSULT TO IR    Network Utilization Review Department  ATTENTION: Please call with any questions or concerns to [phone] and carefully listen to the prompts so that you are directed to the right person  All voicemails are confidential   Amelie Miner all requests for admission clinical reviews, approved or denied determinations and any other requests to dedicated fax number below belonging to the campus where the patient is receiving treatment   List of dedicated fax numbers for the Facilities:  [address] DENIALS (Administrative/Medical Necessity) [phone]   [address] (Maternity/NICU/Pediatrics) [phone]   5000 [facility] [phone]   8049 [facility] [phone]   [place] [address] [facility] [address] [address] [place] [address] [address] [phone]   [place] [address] [phone]

## 2021-10-11 NOTE — PLAN OF CARE
Problem: MOBILITY - ADULT  Goal: Maintain or return to baseline ADL function  Description: INTERVENTIONS:  -  Assess patient's ability to carry out ADLs; assess patient's baseline for ADL function and identify physical deficits which impact ability to perform ADLs (bathing, care of mouth/teeth, toileting, grooming, dressing, etc )  - Assess/evaluate cause of self-care deficits   - Assess range of motion  - Assess patient's mobility; develop plan if impaired  - Assess patient's need for assistive devices and provide as appropriate  - Encourage maximum independence but intervene and supervise when necessary  - Involve family in performance of ADLs  - Assess for home care needs following discharge   - Consider OT consult to assist with ADL evaluation and planning for discharge  - Provide patient education as appropriate  Outcome: Progressing  Goal: Maintains/Returns to pre admission functional level  Description: INTERVENTIONS:  - Perform BMAT or MOVE assessment daily    - Set and communicate daily mobility goal to care team and patient/family/caregiver  - Collaborate with rehabilitation services on mobility goals if consulted  - Perform Range of Motion  times a day  - Reposition patient everyhours    - Dangle patient times a day  - Stand patient  times a day  - Ambulate patient  times a day  - Out of bed to chair times a day   - Out of bed for meals  times a day  - Out of bed for toileting  - Record patient progress and toleration of activity level   Outcome: Progressing

## 2021-10-11 NOTE — PROGRESS NOTES
[address] [facility]  Progress Note - Inocencio Hayes [birth year], 77 y o  female MRN: [id number]  Unit/Bed#: [facility] 929-01 Encounter: [id number]  Primary Care Provider: Anastasia Fonseca MD   Date and time admitted to hospital: 10/9/2021  2:19 PM    * Renal hematoma, left  Assessment & Plan    Left renal hematoma:   Presented with left renal hematoma  Pigtail catheter is noted medial to kidney  Renal pelvis may be collapsed  Hemorrhage and thickening extending in the combined interfascial place of retrospective as well as some high density fluid within  Urology on board  No plan for intervention per urology at this time  Observation and treat the infection  Hg overnight was 6 9 ordered 1 unit PRBC, improved to 8 5    Low threshold for repeat imaging if pain or anemia worsens/progresses        Obstructive uropathy  Assessment & Plan  Obstructive nephropathy  Chronic bilateral hydronephrosis stents were recently removed  Urology on board    Sepsis [facility])  Assessment & Plan  -Sepsis, POA   Patient presented with progressing flank/abdominal discomfort likely due to Left renal hematoma with chronic obstructive uropathy  -Presented with temp 102 4, tachycardia, tachypnea and elevated pro calcitonin possibly suggesting urinary source of infection  -ID is on board  -Vanco discontinued by ID continue with ceftriaxone on 10/10   -Blood culture growing fusobacterium  -urine culture growing E coli  -her ID recommendations ceftriaxone was discontinued today  Patient started on cefepime and Flagyl   -Spoke with IR regarding obtaining central venous access, IR to evaluate patient today  Remove femoral central line once new access achieved    -C  Diff was ordered in setting of diarrhea, which was negative   Diarrhea probably 2/2 abx use    Localized swelling of right upper extremity  Assessment & Plan  Pt presented with right upper extremity swelling  Vascular surgery consulted on admission  IR also consulted for fistulagram, plan to do fistulagram once patient is more stable  US doppler showed: There is a >50% stenosis noted in the proximal subclavian vein  There is a >50%  stenosis noted the proximal basilic vein, which also torturous  The dialysis AVF appears to be matured with adequate diameter, flow volumes and  less than 6mm in depth throughout the arm  Pleural effusion  Assessment & Plan  Mild to moderate pleural effusion on CXR  Discussed with IR who recommended placing thoracentesis order  They will evaluate for drainage    Acute renal failure superimposed on stage 5 chronic kidney disease, not on chronic dialysis [facility])  Assessment & Plan  Lab Results   Component Value Date    EGFR 12 10/11/2021    EGFR 12 10/10/2021    EGFR 13 10/09/2021    CREATININE 3 55 (H) 10/11/2021    CREATININE 3 60 (H) 10/10/2021    CREATININE 3 26 (H) 10/09/2021   Stage 5 chronic kidney disease not on chronic dialysis  Patient has progressive CKD probably secondary to obstructive nephropathy  Avoid nephrotoxic medication  Daily BMP  Nephrology consult, appreciate input  Will hold lasix today    Anemia due to stage 5 chronic kidney disease, not on chronic dialysis [facility])  Assessment & Plan  Anemia 2/2 CKD with component of acute blood loss anemia  Hg trending down from 9 3 to 7 1 to 6 9  1 unit of prbc ordered improving hg to 8 5  Hold lasix today per nephro, if patient starts desaturating consider lasix dose for pulmonary edema after transfusion           Polycythemia vera ([address] )  Assessment & Plan  Hx of Polycythemia vera and MDS  Significant anemia secondary to blood loss in the past   The patient is currently off of the [facility] treatment for her PV and getting mainly Aranesp on every 28 day basis  Hematology consulted for evaluation, recommend once patient is better/ stable, discharge, she will follow-up with Dr Damien Montoya,  her primary hematologist     Hypertension  Assessment & Plan  Continue with metoprolol 25 mg bid    Chronic saddle pulmonary embolism (HCC)  Assessment & Plan  Chronic saddle pulmonary embolus  Eliquis 2 5 mg bid at home, currently on hold for now if IR wants to do intervention  Hg trended down to 6 9 overnight, given one unit PRBC  Recommend restarting eliquis once Hg and hematoma stabilize  VTE Pharmacologic Prophylaxis: VTE Score: 13 High Risk (Score >/= 5) - Pharmacological DVT Prophylaxis Contraindicated  Sequential Compression Devices Ordered  Patient Centered Rounds: I performed bedside rounds with nursing staff today  Discussions with Specialists or Other Care Team Provider: IR and ID    Education and Discussions with Family / Patient: Attempted to update  (son) via phone  Left voicemail  Time Spent for Care: 30 minutes  More than 50% of total time spent on counseling and coordination of care as described above  Current Length of Stay: 2 day(s)  Current Patient Status: Inpatient   Certification Statement: The patient will continue to require additional inpatient hospital stay due to sepsis, anemia, molina  Discharge Plan: Anticipate discharge in 48-72 hrs to discharge location to be determined pending rehab evaluations  Code Status: Level 1 - Full Code    Subjective:   Patient seen examined at bedside  States that she is tired and admits to having some right lower quadrant abdominal pain  Objective:     Vitals:   Temp (24hrs), Av 1 °F (36 7 °C), Min:97 6 °F (36 4 °C), Max:98 5 °F (36 9 °C)    Temp:  [97 6 °F (36 4 °C)-98 5 °F (36 9 °C)] 98 5 °F (36 9 °C)  HR:  [83-94] 83  Resp:  [17-20] 18  BP: (107-137)/(45-75) 110/60  SpO2:  [90 %-97 %] 90 %  Body mass index is 37 24 kg/m²  Input and Output Summary (last 24 hours): Intake/Output Summary (Last 24 hours) at 10/11/2021 1759  Last data filed at 10/11/2021 1354  Gross per 24 hour   Intake --   Output 500 ml   Net -500 ml       Physical Exam:   Physical Exam  Vitals reviewed     Constitutional: Appearance: She is obese  She is ill-appearing  HENT:      Head: Normocephalic and atraumatic  Right Ear: External ear normal       Left Ear: External ear normal       Nose: Nose normal       Mouth/Throat:      Mouth: Mucous membranes are moist       Pharynx: Oropharynx is clear  Eyes:      Extraocular Movements: Extraocular movements intact  Conjunctiva/sclera: Conjunctivae normal    Cardiovascular:      Rate and Rhythm: Normal rate and regular rhythm  Pulses: Normal pulses  Heart sounds: Normal heart sounds  Pulmonary:      Effort: Pulmonary effort is normal       Breath sounds: Rales present  Abdominal:      General: Abdomen is flat  Palpations: Abdomen is soft  Tenderness: There is abdominal tenderness  There is no guarding or rebound  Musculoskeletal:         General: Swelling present  Cervical back: Normal range of motion  Right lower leg: Edema present  Left lower leg: Edema present  Skin:     General: Skin is warm and dry  Neurological:      General: No focal deficit present  Mental Status: She is alert and oriented to person, place, and time     Psychiatric:         Mood and Affect: Mood normal          Behavior: Behavior normal           Additional Data:     Labs:  Results from last 7 days   Lab Units 10/11/21  1652 10/11/21  0337   WBC Thousand/uL  --  4 85   HEMOGLOBIN g/dL 9 2* 6 9*   HEMATOCRIT % 29 0* 22 0*   PLATELETS Thousands/uL  --  212   BANDS PCT %  --  4   LYMPHO PCT %  --  6*   MONO PCT %  --  3*   EOS PCT %  --  0     Results from last 7 days   Lab Units 10/11/21  0337 10/10/21  0621   SODIUM mmol/L 141 138   POTASSIUM mmol/L 3 0* 3 8   CHLORIDE mmol/L 113* 109*   CO2 mmol/L 20* 18*   BUN mg/dL 51* 44*   CREATININE mg/dL 3 55* 3 60*   ANION GAP mmol/L 8 11   CALCIUM mg/dL 7 9* 8 5   ALBUMIN g/dL  --  3 0*   TOTAL BILIRUBIN mg/dL  --  1 60*   ALK PHOS U/L  --  81   ALT U/L  --  15   AST U/L  --  72*   GLUCOSE RANDOM mg/dL 93 107 Results from last 7 days   Lab Units 10/11/21  0337   INR  2 00*             Results from last 7 days   Lab Units 10/10/21  0621 10/09/21  1447   LACTIC ACID mmol/L  --  1 7   PROCALCITONIN ng/ml 78 78* 21 02*       Lines/Drains:  Invasive Devices     Central Venous Catheter Line            CVC Central Lines 10/09/21 Triple Left Femoral 1 day          Line            Hemodialysis AV Fistula 09/30/21 Right Upper arm 11 days          Drain            Urostomy Ileal conduit RUQ 1832 days                Central Line:  Goal for removal: Will discontinue when hemodynamically stable               Imaging: Reviewed radiology reports from this admission including: abdominal/pelvic CT and ultrasound(s)    Recent Cultures (last 7 days):   Results from last 7 days   Lab Units 10/11/21  0222 10/10/21  1256 10/09/21  1447 10/09/21  1444   BLOOD CULTURE   --   --  Fusobacterium mortiferum* Fusobacterium mortiferum*   GRAM STAIN RESULT   --   --  Gram negative rods* Gram negative rods*   URINE CULTURE   --  >100,000 cfu/ml Gram Negative Marcel Enteric Like*  --   --    C DIFF TOXIN B BY PCR  Negative  --   --   --        Last 24 Hours Medication List:   Current Facility-Administered Medications   Medication Dose Route Frequency Provider Last Rate    acetaminophen  650 mg Oral Q6H PRN Dmitriy Lyons MD      acetaminophen  975 mg Oral Q8H [address] Dmitriy Lyons MD      aluminum-magnesium hydroxide-simethicone  30 mL Oral Q6H PRN Dmitriy Lyons MD      atorvastatin  40 mg Oral HS Dmitriy Lyons MD      calcitriol  0 25 mcg Oral Daily Dmitriy Lyons MD      cefepime  1,000 mg Intravenous Q12H Mimi Chavarria MD 1,000 mg (10/11/21 1121)    cholecalciferol  800 Units Oral Daily Dmitriy Lyons MD      citalopram  20 mg Oral Daily Dmitriy Lyons MD      docusate sodium  100 mg Oral BID Dmitriy Lyons MD      fentanyl citrate (PF)  25 mcg Intravenous Q2H PRN Dmitriy Lyons MD      levothyroxine  75 mcg Oral Daily Anna De Leon MD      melatonin  3 mg Oral HS Anna De Leon MD      metoprolol tartrate  25 mg Oral BID Anna De Leon MD      metroNIDAZOLE  500 mg Oral [facility] Judith Rider MD      ondansetron  4 mg Intravenous Q6H PRN Anna De Leon MD      saccharomyces boulardii  250 mg Oral Daily Anna De Leon MD      senna  1 tablet Oral Daily Anna De Leon MD      sodium bicarbonate  650 mg Oral BID after meals Anna De Leon MD          Today, Patient Was Seen By: Nasra Chavez DO    **Please Note: This note may have been constructed using a voice recognition system  **

## 2021-10-11 NOTE — PLAN OF CARE
Problem: PHYSICAL THERAPY ADULT  Goal: Performs mobility at highest level of function for planned discharge setting  See evaluation for individualized goals  Description: Treatment/Interventions: Patient/family training, LE strengthening/ROM, Bed mobility, Spoke to nursing, Spoke to case management, OT  Equipment Recommended:  (TBD )       See flowsheet documentation for full assessment, interventions and recommendations  10/11/2021 1230 by Jennifer Grande, PT  Note: Prognosis: Good  Problem List: Decreased strength, Decreased endurance, Impaired balance, Decreased mobility, Pain, Decreased skin integrity  Assessment: Pt seen for high complexity PT evaluation  Pt with active PT eval/treat and up with assistance and up and OOB as tolerated orders  Pt is a 77 y o  female who was admitted to [facility] on 10/9/2021 with left renal hematoma  Pt's active problems include: SIRS syndrome, obstructive uropathy, left pleural effusion, hx of C-diff, CKD with fistula, polycythemia  Pt  has a past medical history of Anxiety, Bowel obstruction ([address] ), Chronic kidney disease, Chronic kidney disease (CKD), stage IV (severe) ([address] ), Chronic thrombosis of subclavian vein ([address] ), Circulation problem, Compression fracture of cervical spine ([address] ), COVID-19 (07/2021), Hernia of abdominal cavity, History of kidney problems, History of transfusion, Hydronephrosis, Hypertension, IBS (irritable bowel syndrome), Incontinence, Lung mass, Polycythemia vera ([address] ), Pulmonary embolism ([address] ) (2014), Shingles, and Urinary tract infection  Pt resides alone in 2  with 6 MARLON (bed/ bath upstairs) and was independent using SPC prior to hospital admission  Pt has limitations in strength, balance, endurance, and overall functional mobility  Pt requires assist of 1-2 for all mobility performed this date  Pt performed R/L rolling with mod Ax1   Pt performed sup <> sit and sit <> sup bed mobility tasks with mod Ax2; pt unable to tolerate sitting EOB 2* increased pain in left lower abdomen- pt requesting to lay back down and defer further mobilization  Pt was left supine in bed with alarm on at the end of PT session with all needs in reach  Pt would benefit from continued PT services while in hospital to address remaining limitations  PT to continue to follow pt and recommends post-acute rehab services after d/c  The patient's AM-PAC Basic Mobility Inpatient Short Form Low Function Raw Score 15 , Standardized Score is 23 9  A standardized score less 42 9 suggests the patient may benefit from discharge to post-acute rehab services  Please also refer to the recommendation of the Physical Therapist for safe discharge planning  Barriers to Discharge: Inaccessible home environment, Decreased caregiver support        PT Discharge Recommendation: Post acute rehabilitation services     PT - OK to Discharge: Yes (to rehab once medically cleared )    See flowsheet documentation for full assessment

## 2021-10-11 NOTE — ASSESSMENT & PLAN NOTE
Pt presented with right upper extremity swelling  Vascular surgery consulted on admission  IR also consulted for fistulagram, plan to do fistulagram once patient is more stable  US doppler showed: There is a >50% stenosis noted in the proximal subclavian vein  There is a >50%  stenosis noted the proximal basilic vein, which also torturous  The dialysis AVF appears to be matured with adequate diameter, flow volumes and  less than 6mm in depth throughout the arm

## 2021-10-11 NOTE — PROGRESS NOTES
UROLOGY PROGRESS NOTE   Patient Identifiers: Miguelito Lynne (MRN [id number])  Date of Service: 10/11/2021    Subjective:    Awake and alert  Seems fairly comfortable at rest   H&H 8 5 and 27 1  Creatinine 3 55  Patient has  no complaints        Objective:     VITALS:    Vitals:    10/11/21 1132   BP:    Pulse:    Resp:    Temp:    SpO2: 96%           LABS:  Lab Results   Component Value Date    HGB 8 5 (L) 10/11/2021    HCT 27 1 (L) 10/11/2021    WBC 4 85 10/11/2021     10/11/2021   ]    Lab Results   Component Value Date     12/17/2015    K 3 0 (L) 10/11/2021     (H) 10/11/2021    CO2 20 (L) 10/11/2021    BUN 51 (H) 10/11/2021    CREATININE 3 55 (H) 10/11/2021    CALCIUM 7 9 (L) 10/11/2021    GLUCOSE 138 10/04/2016   ]        INPATIENT MEDS:    Current Facility-Administered Medications:     acetaminophen (TYLENOL) tablet 650 mg, 650 mg, Oral, Q6H PRN, Kole Marcelo MD    acetaminophen (TYLENOL) tablet 975 mg, 975 mg, Oral, Q8H [address], Kole Marcelo MD, 975 mg at 10/11/21 1426    aluminum-magnesium hydroxide-simethicone (MYLANTA) oral suspension 30 mL, 30 mL, Oral, Q6H PRN, Kole Marcelo MD    atorvastatin (LIPITOR) tablet 40 mg, 40 mg, Oral, HS, Kole Marcelo MD, 40 mg at 10/10/21 2233    calcitriol (ROCALTROL) capsule 0 25 mcg, 0 25 mcg, Oral, Daily, Kole Marcelo MD, 0 25 mcg at 10/11/21 1127    cefepime (MAXIPIME) 1,000 mg in dextrose 5 % 50 mL IVPB, 1,000 mg, Intravenous, Q12H, Brinda Mejias MD, Last Rate: 100 mL/hr at 10/11/21 1121, 1,000 mg at 10/11/21 1121    cholecalciferol (VITAMIN D) oral liquid 800 Units, 800 Units, Oral, Daily, Kole Marcelo MD, 800 Units at 10/11/21 1127    citalopram (CeleXA) tablet 20 mg, 20 mg, Oral, Daily, Kole Marcelo MD, 20 mg at 10/11/21 1127    docusate sodium (COLACE) capsule 100 mg, 100 mg, Oral, BID, Kole Marcelo MD, 100 mg at 10/09/21 2232    fentanyl citrate (PF) 100 MCG/2ML 25 mcg, 25 mcg, Intravenous, Q2H PRN, Abeba Chavarria MD    levothyroxine tablet 75 mcg, 75 mcg, Oral, Daily, Abeba Chavarria MD, 75 mcg at 10/11/21 1128    melatonin tablet 3 mg, 3 mg, Oral, HS, Abeba Chavarria MD, 3 mg at 10/10/21 2233    metoprolol tartrate (LOPRESSOR) tablet 25 mg, 25 mg, Oral, BID, Abeba Chavarria MD, 25 mg at 10/11/21 1127    metroNIDAZOLE (FLAGYL) tablet 500 mg, 500 mg, Oral, Q8H LONG, Niall Guzman MD    ondansetron (ZOFRAN) injection 4 mg, 4 mg, Intravenous, Q6H PRN, Abeba Chavarria MD    saccharomyces boulardii (FLORASTOR) capsule 250 mg, 250 mg, Oral, Daily, Abeba Chavarria MD, 250 mg at 10/11/21 1127    senna (SENOKOT) tablet 8 6 mg, 1 tablet, Oral, Daily, Abeba Chavarria MD    sodium bicarbonate tablet 650 mg, 650 mg, Oral, BID after meals, Abeba Chavarria MD, 650 mg at 10/11/21 1128      Physical Exam:   /60   Pulse 92   Temp 98 °F (36 7 °C)   Resp 17   Ht 5' (1 524 m)   Wt 86 5 kg (190 lb 11 2 oz)   SpO2 96%   BMI 37 24 kg/m²   GEN: no acute distress    RESP: breathing comfortably with no accessory muscle use    ABD: soft, non-tender, non-distended   INCISION:    EXT: bilateral peripheral edema       RADIOLOGY:   CT ABDOMEN AND PELVIS WITHOUT IV CONTRAST     IMPRESSION:     In the left renal fossa, there is a collection of mixed intermediate and high attenuation suggesting a hematoma with some bubbles of gas  Pigtail catheter is noted medial to the kidney  The renal pelvis may be collapsed  There appears to be some   hemorrhage and thickening extending in the combined interfascial plane of the retroperitoneum as well as some high density fluid within the pelvis suggesting hemoperitoneum  Superinfection of the kidney is possible  Urology evaluation is advised  Nonobstructing calculi in the right kidney  Some prominent loops of bowel are more likely large bowel suggesting ileus       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

## 2021-10-11 NOTE — UTILIZATION REVIEW
Inpatient Admission Authorization Request   NOTIFICATION OF INPATIENT ADMISSION/INPATIENT AUTHORIZATION REQUEST   SERVICING FACILITY:   [facility]  Address: [address] [place] [address], [address] [place] [address]  Tax ID: [id number]  NPI: [id number]  Place of Service: Inpatient [address] [facility] Code: 24     ATTENDING PROVIDER:  Attending Name and NPI#: Maria Luisa Smith [[id number]]  Address: [address] [place] [address], [address] [place] [address]  Phone: [phone]     UTILIZATION REVIEW CONTACT:  [facility] Utilization   Network Utilization Review Department  Phone: [phone]  Fax: [phone]  Email: [email or web address]     PHYSICIAN ADVISORY SERVICES:  FOR [id number] REVIEW - MEDICAL NECESSITY DENIAL  Phone: [phone]  Fax: [phone]  Email: [email or web address]     TYPE OF REQUEST:  Inpatient Status     ADMISSION INFORMATION:  ADMISSION DATE/TIME: 10/9/21  6:35 PM  PATIENT DIAGNOSIS CODE/DESCRIPTION:  Abdominal pain [R10 9]  Renal hematoma [S37 019A]  Sepsis ([facility] [address] ) [A41 9]  DISCHARGE DATE/TIME: No discharge date for patient encounter  DISCHARGE DISPOSITION (IF DISCHARGED): Home with Home Health Care     IMPORTANT INFORMATION:  Please contact the [facility] directly with any questions or concerns regarding this request  Department voicemails are confidential     Send requests for admission clinical reviews, concurrent reviews, approvals, and administrative denials due to lack of clinical to fax [phone]

## 2021-10-11 NOTE — ASSESSMENT & PLAN NOTE
Anemia 2/2 CKD with component of acute blood loss anemia  Hg trending down from 9 3 to 7 1 to 6 9  1 unit of prbc ordered improving hg to 8 5  Hold lasix today per nephro, if patient starts desaturating consider lasix dose for pulmonary edema after transfusion

## 2021-10-12 ENCOUNTER — TELEPHONE (OUTPATIENT)
Dept: ADMINISTRATIVE | Facility: HOSPITAL | Age: 76
End: 2021-10-12

## 2021-10-12 ENCOUNTER — PATIENT OUTREACH (OUTPATIENT)
Dept: CASE MANAGEMENT | Facility: HOSPITAL | Age: 76
End: 2021-10-12

## 2021-10-12 LAB
ABO GROUP BLD BPU: NORMAL
ALBUMIN SERPL BCP-MCNC: 2.2 G/DL (ref 3.5–5)
ANION GAP SERPL CALCULATED.3IONS-SCNC: 11 MMOL/L (ref 4–13)
ATRIAL RATE: 178 BPM
BACTERIA BLD CULT: ABNORMAL
BACTERIA BLD CULT: ABNORMAL
BPU ID: NORMAL
BUN SERPL-MCNC: 53 MG/DL (ref 5–25)
CALCIUM ALBUM COR SERPL-MCNC: 9.4 MG/DL (ref 8.3–10.1)
CALCIUM SERPL-MCNC: 8 MG/DL (ref 8.3–10.1)
CHLORIDE SERPL-SCNC: 115 MMOL/L (ref 100–108)
CO2 SERPL-SCNC: 15 MMOL/L (ref 21–32)
CREAT SERPL-MCNC: 3.63 MG/DL (ref 0.6–1.3)
CROSSMATCH: NORMAL
ERYTHROCYTE [DISTWIDTH] IN BLOOD BY AUTOMATED COUNT: 17.1 % (ref 11.6–15.1)
GFR SERPL CREATININE-BSD FRML MDRD: 12 ML/MIN/1.73SQ M
GLUCOSE SERPL-MCNC: 66 MG/DL (ref 65–140)
GRAM STN SPEC: ABNORMAL
GRAM STN SPEC: ABNORMAL
HCT VFR BLD AUTO: 29.7 % (ref 34.8–46.1)
HGB BLD-MCNC: 9.3 G/DL (ref 11.5–15.4)
MAGNESIUM SERPL-MCNC: 2.3 MG/DL (ref 1.6–2.6)
MCH RBC QN AUTO: 28.9 PG (ref 26.8–34.3)
MCHC RBC AUTO-ENTMCNC: 31.3 G/DL (ref 31.4–37.4)
MCV RBC AUTO: 92 FL (ref 82–98)
PHOSPHATE SERPL-MCNC: 3.2 MG/DL (ref 2.3–4.1)
PLATELET # BLD AUTO: 244 THOUSANDS/UL (ref 149–390)
PMV BLD AUTO: 10.6 FL (ref 8.9–12.7)
POTASSIUM SERPL-SCNC: 3.3 MMOL/L (ref 3.5–5.3)
QRS AXIS: -5 DEGREES
QRSD INTERVAL: 86 MS
QT INTERVAL: 330 MS
QTC INTERVAL: 509 MS
RBC # BLD AUTO: 3.22 MILLION/UL (ref 3.81–5.12)
SODIUM SERPL-SCNC: 141 MMOL/L (ref 136–145)
T WAVE AXIS: 37 DEGREES
UNIT DISPENSE STATUS: NORMAL
UNIT PRODUCT CODE: NORMAL
UNIT PRODUCT VOLUME: 350 ML
UNIT RH: NORMAL
VENTRICULAR RATE: 143 BPM
WBC # BLD AUTO: 3.96 THOUSAND/UL (ref 4.31–10.16)

## 2021-10-12 PROCEDURE — 99232 SBSQ HOSP IP/OBS MODERATE 35: CPT | Performed by: INTERNAL MEDICINE

## 2021-10-12 PROCEDURE — 87040 BLOOD CULTURE FOR BACTERIA: CPT | Performed by: STUDENT IN AN ORGANIZED HEALTH CARE EDUCATION/TRAINING PROGRAM

## 2021-10-12 PROCEDURE — 85027 COMPLETE CBC AUTOMATED: CPT | Performed by: STUDENT IN AN ORGANIZED HEALTH CARE EDUCATION/TRAINING PROGRAM

## 2021-10-12 PROCEDURE — 99232 SBSQ HOSP IP/OBS MODERATE 35: CPT | Performed by: UROLOGY

## 2021-10-12 PROCEDURE — 80069 RENAL FUNCTION PANEL: CPT | Performed by: STUDENT IN AN ORGANIZED HEALTH CARE EDUCATION/TRAINING PROGRAM

## 2021-10-12 PROCEDURE — 93010 ELECTROCARDIOGRAM REPORT: CPT | Performed by: INTERNAL MEDICINE

## 2021-10-12 PROCEDURE — 93005 ELECTROCARDIOGRAM TRACING: CPT

## 2021-10-12 PROCEDURE — 83735 ASSAY OF MAGNESIUM: CPT | Performed by: STUDENT IN AN ORGANIZED HEALTH CARE EDUCATION/TRAINING PROGRAM

## 2021-10-12 PROCEDURE — 99233 SBSQ HOSP IP/OBS HIGH 50: CPT | Performed by: INTERNAL MEDICINE

## 2021-10-12 RX ORDER — POTASSIUM CHLORIDE 20MEQ/15ML
40 LIQUID (ML) ORAL ONCE
Status: COMPLETED | OUTPATIENT
Start: 2021-10-12 | End: 2021-10-12

## 2021-10-12 RX ORDER — POTASSIUM CHLORIDE 14.9 MG/ML
20 INJECTION INTRAVENOUS ONCE
Status: COMPLETED | OUTPATIENT
Start: 2021-10-12 | End: 2021-10-12

## 2021-10-12 RX ORDER — METOPROLOL TARTRATE 5 MG/5ML
2.5 INJECTION INTRAVENOUS ONCE
Status: COMPLETED | OUTPATIENT
Start: 2021-10-12 | End: 2021-10-12

## 2021-10-12 RX ADMIN — SODIUM BICARBONATE 650 MG TABLET 650 MG: at 17:50

## 2021-10-12 RX ADMIN — MELATONIN TAB 3 MG 3 MG: 3 TAB at 21:26

## 2021-10-12 RX ADMIN — SODIUM BICARBONATE 650 MG TABLET 650 MG: at 11:01

## 2021-10-12 RX ADMIN — METRONIDAZOLE 500 MG: 500 TABLET ORAL at 21:27

## 2021-10-12 RX ADMIN — POTASSIUM CHLORIDE 40 MEQ: 20 SOLUTION ORAL at 11:41

## 2021-10-12 RX ADMIN — ACETAMINOPHEN 975 MG: 325 TABLET, FILM COATED ORAL at 05:35

## 2021-10-12 RX ADMIN — STANDARDIZED SENNA CONCENTRATE 8.6 MG: 8.6 TABLET ORAL at 11:01

## 2021-10-12 RX ADMIN — DOCUSATE SODIUM 100 MG: 100 CAPSULE, LIQUID FILLED ORAL at 11:01

## 2021-10-12 RX ADMIN — ACETAMINOPHEN 975 MG: 325 TABLET, FILM COATED ORAL at 21:27

## 2021-10-12 RX ADMIN — CALCITRIOL 0.25 MCG: 0.25 CAPSULE, LIQUID FILLED ORAL at 11:09

## 2021-10-12 RX ADMIN — METOPROLOL TARTRATE 25 MG: 25 TABLET, FILM COATED ORAL at 11:02

## 2021-10-12 RX ADMIN — CITALOPRAM HYDROBROMIDE 20 MG: 20 TABLET ORAL at 11:01

## 2021-10-12 RX ADMIN — CEFEPIME HYDROCHLORIDE 1000 MG: 1 INJECTION, POWDER, FOR SOLUTION INTRAMUSCULAR; INTRAVENOUS at 09:32

## 2021-10-12 RX ADMIN — SODIUM BICARBONATE 50 ML/HR: 84 INJECTION, SOLUTION INTRAVENOUS at 16:25

## 2021-10-12 RX ADMIN — METRONIDAZOLE 500 MG: 500 TABLET ORAL at 05:35

## 2021-10-12 RX ADMIN — ATORVASTATIN CALCIUM 40 MG: 40 TABLET, FILM COATED ORAL at 21:26

## 2021-10-12 RX ADMIN — METRONIDAZOLE 500 MG: 500 TABLET ORAL at 14:11

## 2021-10-12 RX ADMIN — METOROPROLOL TARTRATE 2.5 MG: 5 INJECTION, SOLUTION INTRAVENOUS at 02:18

## 2021-10-12 RX ADMIN — LEVOTHYROXINE SODIUM 75 MCG: 75 TABLET ORAL at 11:02

## 2021-10-12 RX ADMIN — POTASSIUM CHLORIDE 20 MEQ: 14.9 INJECTION, SOLUTION INTRAVENOUS at 11:42

## 2021-10-12 RX ADMIN — Medication 250 MG: at 11:02

## 2021-10-12 RX ADMIN — ACETAMINOPHEN 975 MG: 325 TABLET, FILM COATED ORAL at 14:11

## 2021-10-12 NOTE — PROGRESS NOTES
Progress Note - Urology  Anaya Fowler 1945, 77 y o  female MRN: [id number]    Unit/Bed#: [facility] 929-01 Encounter: [id number]    Left renal perinephric hematoma  · Sp PCN removal last week   · Hgb stable 8 5--9 2--9 5 today   · Urine light nikos   · As long as no evidence of active bleeding, no intervention recommended     Hx of super trigonal cystectomy  · Ileal conduit draining clear yellow urine   · Chronic indwelling nephroureteral stent due to stricture     Subjective:   HPI: Patient states she has occasional left sided abdominal pain that comes and goes, currently feels well during visit  Objective:  Nursing Rounds: Nurse at bedside  Vitals: Blood pressure 105/61, pulse 98, temperature 98 5 °F (36 9 °C), resp  rate 18, height 5' (1 524 m), weight 86 5 kg (190 lb 11 2 oz), SpO2 94 %, not currently breastfeeding  ,Body mass index is 37 24 kg/m²  Physical Exam  Vitals and nursing note reviewed  Constitutional:       Appearance: She is obese  She is ill-appearing  HENT:      Head: Normocephalic and atraumatic  Cardiovascular:      Rate and Rhythm: Tachycardia present  Pulmonary:      Effort: Pulmonary effort is normal       Comments: diminished  Abdominal:      General: Bowel sounds are normal       Tenderness: There is abdominal tenderness (left )  Genitourinary:     Comments: Ileal conduit, urine clear nikos   Musculoskeletal:         General: Swelling (right upper extremity ) present  Skin:     General: Skin is warm and dry  Findings: Bruising present  Neurological:      Mental Status: She is alert and oriented to person, place, and time  Psychiatric:         Mood and Affect: Mood normal          Behavior: Behavior normal          Thought Content:  Thought content normal          Judgment: Judgment normal             Labs:  Recent Labs     10/09/21  1447 10/10/21  0621 10/11/21  0337 10/12/21  0145   WBC 6 15 4 97 4 85 3 96*       Recent Labs     10/09/21  1447 10/10/21  1089 10/10/21  1647 10/11/21  0028 10/11/21  0337 10/11/21  1059 10/11/21  1652 10/12/21  0145   HGB 9 3* 7 1* 7 2* 6 7* 6 9* 8 5* 9 2* 9 3*     Recent Labs     10/09/21  1447 10/10/21  0621 10/10/21  1647 10/11/21  0028 10/11/21  0337 10/11/21  1059 10/11/21  1652 10/12/21  0145   HCT 29 8* 23 2* 23 0* 21 5* 22 0* 27 1* 29 0* 29 7*     Recent Labs     10/09/21  1447 10/10/21  0621 10/11/21  0337 10/12/21  0145   CREATININE 3 26* 3 60* 3 55* 3 63*         History:    Past Medical History:   Diagnosis Date    Anxiety     Bowel obstruction (HCC)     Chronic kidney disease     Chronic kidney disease (CKD), stage IV (severe) (HCC)     stage IV    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    Circulation problem     Compression fracture of cervical spine ([facility] [address] )     COVID-19 07/2021    hospitalized     Hernia of abdominal cavity     History of kidney problems     History of transfusion     Hydronephrosis     Hypertension     IBS (irritable bowel syndrome)     Incontinence     Lung mass     Improving on PET/CT 1/2016    Polycythemia vera ([facility] [address] )     Pulmonary embolism ([facility] ) 2014    Shingles     Urinary tract infection      Social History     Socioeconomic History    Marital status:       Spouse name: None    Number of children: None    Years of education: None    Highest education level: None   Occupational History    None   Tobacco Use    Smoking status: Never Smoker    Smokeless tobacco: Never Used    Tobacco comment: n/a   Vaping Use    Vaping Use: Never used   Substance and Sexual Activity    Alcohol use: Not Currently     Comment: n/s    Drug use: Not Currently     Comment: n/a    Sexual activity: Not Currently     Partners: Male   Other Topics Concern    None   Social History Narrative    None     Social Determinants of Health     Financial Resource Strain:     Difficulty of Paying Living Expenses:    Food Insecurity:     Worried About Running Out of Food in the Last Year:    [address] in the Last Year:    Transportation Needs:     Lack of Transportation (Medical):      Lack of Transportation (Non-Medical):    Physical Activity:     Days of Exercise per Week:     Minutes of Exercise per Session:    Stress:     Feeling of Stress :    Social Connections:     Frequency of Communication with Friends and Family:     Frequency of Social Gatherings with Friends and Family:     Attends Rastafari Services:     Active Member of Clubs or Organizations:     Attends Club or Organization Meetings:     Marital Status:    Intimate Partner Violence:     Fear of Current or Ex-Partner:     Emotionally Abused:     Physically Abused:     Sexually Abused:      Past Surgical History:   Procedure Laterality Date    ABDOMINAL ADHESION SURGERY N/A 8/9/2020    Procedure: LYSIS ADHESIONS;  Surgeon: Julito Molina DO;  [facility];  Service: General    BLADDER SUSPENSION      BOTOX INJECTION N/A 7/27/2016    Procedure: BOTOX INJECTION ;  Surgeon: Isa Wynne MD;  [facility];  Service:     CHOLECYSTECTOMY N/A     COLONOSCOPY      CYSTECTOMY, RADICAL WITH ILEOCONDUIT N/A 10/4/2016    Procedure: Laci Weinberg ;  Surgeon: Isa Wynne MD;  [facility];  Service:    Beltran Sergio CYSTOSCOPY W/ RETROGRADES Bilateral 7/27/2016    Procedure: Ger Ken; RETROGRADE PYELOGRAM ;  Surgeon: Isa Wynne MD;  [facility];  Service:     HERNIA REPAIR      IR AV FISTULAGRAM/GRAFTOGRAM  2/10/2021    IR NEPHROSTOMY TO NEPHROURETERAL STENT  5/15/2021    IR NEPHROSTOMY TO NEPHROURETERAL STENT  6/9/2021    IR NEPHROSTOMY TUBE CHECK AND/OR REMOVAL  6/16/2021    IR NEPHROSTOMY TUBE CHECK/CHANGE/REPOSITION/REINSERTION/UPSIZE  5/14/2021    IR NEPHROSTOMY TUBE CHECK/CHANGE/REPOSITION/REINSERTION/UPSIZE  5/21/2021    IR NEPHROSTOMY TUBE CHECK/CHANGE/REPOSITION/REINSERTION/UPSIZE  9/16/2021    IR NEPHROSTOMY TUBE CHECK/CHANGE/REPOSITION/REINSERTION/UPSIZE  10/4/2021    IR NEPHROSTOMY TUBE PLACEMENT  5/10/2021    IR NEPHROSTOMY TUBE PLACEMENT  9/20/2021    IR NON-TUNNELED CENTRAL LINE PLACEMENT  7/17/2020    IR NON-TUNNELED CENTRAL LINE PLACEMENT  8/14/2020    IR NON-TUNNELED CENTRAL LINE PLACEMENT  7/16/2021    LAPAROTOMY N/A 8/9/2020    Procedure: LAPAROTOMY EXPLORATORY, PARASTOMAL HERNIA REPAIR WITH MESH;  Surgeon: Carlos Lo DO;  [facility];  Service: General    HI ANASTOMOSIS,AV,ANY SITE Right 1/5/2021    Procedure: Creation of right brachiobasilic fistula; Surgeon: Candida Chairez MD;  [facility];  Service: Vascular    HI COLONOSCOPY FLX DX W/COLLJ SPEC WHEN PFRMD N/A 8/31/2016    Procedure: COLONOSCOPY;  Surgeon: Maira Cisneros MD;  [facility]; Service: Gastroenterology    HI CYSTOSCOPY,INSERT URETERAL STENT Bilateral 7/27/2016    Procedure: STENT INSERTION; EXCISION OF MESH ;  Surgeon: Ophelia Soria MD;  [facility];  Service: Urology    HI REVISE AV FISTULA,W/O THROMBECTOMY Right 9/30/2021    Procedure: Superficialization and transposition of right brachiobasilic fistula;   Surgeon: Mani Chairez MD;  [facility];  Service: Vascular    TONSILLECTOMY      TUBAL LIGATION      WISDOM TOOTH EXTRACTION       Family History   Problem Relation Age of Onset    Cancer Mother         small cell cancer     Heart disease Father     COPD Father     Heart disease Brother     Nephrolithiasis Brother        Verda Mortimer, CRNP  Date: 10/12/2021 Time: 10:57 AM

## 2021-10-12 NOTE — PROGRESS NOTES
NEPHROLOGY HOSPITAL PROGRESS NOTE   Justino Tenorio 77 y o  female MRN: [id number]  Unit/Bed#: [facility] 929-01 Encounter: [id number]  Reason for Consult: CKD    ASSESSMENT and PLAN:    71-year-old female with past medical history of CKD, hypertension, hyperparathyroidism, anemia, bilateral hydronephrosis secondary to obstructive uropathy and 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 in October 2016, status post nephrostomy tube placement in May 2021, status post left percutaneous retrograde percutaneous nephrostomy tube placement in September 2021 who initially presents with abdominal pain on October 9th  There is concern for hematoma  Also with elective right brachial basilic fistula superficialization with transposition on September 30th      1) CKD stage 5     - baseline creatinine 3-3 5 mg/dL  -outpatient nephrologist Dr Jon Marie   -urinalysis 20-30 RBC, 20-30 WBC  - access-AV fistula with placed into January 2021 and status post fistulogram with angioplasty earlier this urine also status post transposition on September 30t  -10/11-creatinine stable  3 6 mg/dL  Potassium low being repleted   -10/12-creatinine stable 3 6  Bicarb lower  Urine output diminished      Plan  -continue to hold diuretics  -give bicarbonate fluids add low-dose for short course  -okay to continue oral bicarbonate tablets for now  -if signs of pulmonary edema, give Lasix  -BMP and phosphorus in a m   - avoid hypotension  - avoid nephrotoxic agents  -replete potassium especially is giving bicarbonate fluid     2) retroperitoneal hematoma-due to perinephric hematoma and now concern for sepsis     -urology on board-continued observation of left renal hematoma    -concern for possible sepsis due to initial fever and leukocytosis-ID on board-on empiric antibiotics  -antibiotics expanded to include cefepime and Flagyl per ID team due to bacteremia with fusiform bacterium  -urine culture with Klebsiella and Enterobacter     3) electrolytes     -hypokalemia-being repleted-ordered     4) acid/base     -on sodium bicarbonate tablets- worsening bicarbonate  Give bicarbonate fluids  -slightly elevated anion gap     5) anemia-     -transfusion per primary team  - f/u haptoglobin     6) history of cystectomy with ileal conduit in 2016 due to bladder dysfunction and 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 and 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 with left nephrostomy tube and nephroureteral catheter     -on October 4th, nephrostomy tube was removed  -most recent CT scan concerning for hemoperitoneum, perinephric hematoma     7) hemodialysis access-vascular surgery team on board due to arm swelling     -duplex--greater than 50% stenosis in the proximal subclavian vein, and basilic vein  -IR has been consulted for evaluation  Once the patient is more stable for fistulogram      8) MBD-on calcitriol and vitamin-D     9) volume-has whole body overloaded but is lying supine  Appears comfortable        10) pl effusion - per Primary team; small L effusion    -status post thoracentesis on 10/11 with 90 cc aspirated with inability to aspirate further  -follow-up thoracentesis studies per primary team    11) polycythemia vera-per primary team    SUBJECTIVE / 24H INTERVAL HISTORY:    Patient denies complaints  No shortness of breath      OBJECTIVE:  Current Weight: Weight - Scale: 86 5 kg (190 lb 11 2 oz)  Vitals:    10/12/21 0311 10/12/21 0423 10/12/21 0537 10/12/21 0816   BP: 97/57 98/57 104/60 105/61   BP Location:       Pulse: 99 96 96 98   Resp:       Temp:       TempSrc:       SpO2: 94% 96% 95% 94%   Weight:       Height:           Intake/Output Summary (Last 24 hours) at 10/12/2021 1100  Last data filed at 10/12/2021 0900  Gross per 24 hour   Intake 120 ml   Output 295 ml   Net -175 ml     General: NAD  Skin: no rash  Eyes: anicteric sclera  ENT: moist mucous membrane  Neck: supple  Chest:  Diminished intake bases  No wheezes  No rales  CVS: s1s2, no murmur, no gallop, no rub  Abdomen: soft, nontender, nl sounds, positive urostomy  Extremities:  1+ edema LE b/l  : no camp  Neuro: AAOX3  Psych: normal affect  Right upper extremity AV fistula site with sutures in place      Medications:    Current Facility-Administered Medications:     acetaminophen (TYLENOL) tablet 650 mg, 650 mg, Oral, Q6H PRN, Myrtle Bolanos MD, 650 mg at 10/11/21 1739    acetaminophen (TYLENOL) tablet 975 mg, 975 mg, Oral, Q8H [address], Myrtle Bolanos MD, 975 mg at 10/12/21 0535    aluminum-magnesium hydroxide-simethicone (MYLANTA) oral suspension 30 mL, 30 mL, Oral, Q6H PRN, Myrtle Bolanos MD    atorvastatin (LIPITOR) tablet 40 mg, 40 mg, Oral, HS, Myrtle Bolanos MD, 40 mg at 10/11/21 2132    calcitriol (ROCALTROL) capsule 0 25 mcg, 0 25 mcg, Oral, Daily, Mytrle Bolanos MD, 0 25 mcg at 10/11/21 1127    cefepime (MAXIPIME) 1,000 mg in dextrose 5 % 50 mL IVPB, 1,000 mg, Intravenous, Q12H, Chey Lay MD, Last Rate: 100 mL/hr at 10/12/21 0932, 1,000 mg at 10/12/21 0932    cholecalciferol (VITAMIN D) oral liquid 800 Units, 800 Units, Oral, Daily, Myrtle Bolanos MD, 800 Units at 10/11/21 1127    citalopram (CeleXA) tablet 20 mg, 20 mg, Oral, Daily, Myrtle Bolanos MD, 20 mg at 10/11/21 1127    docusate sodium (COLACE) capsule 100 mg, 100 mg, Oral, BID, Myrtle Bolanos MD, 100 mg at 10/09/21 2232    fentanyl citrate (PF) 100 MCG/2ML 25 mcg, 25 mcg, Intravenous, Q2H PRN, Myrtle Bolanos MD    levothyroxine tablet 75 mcg, 75 mcg, Oral, Daily, Myrtle Bolanos MD, 75 mcg at 10/11/21 1128    melatonin tablet 3 mg, 3 mg, Oral, HS, Myrtle Bolanos MD, 3 mg at 10/11/21 2133    metoprolol tartrate (LOPRESSOR) tablet 25 mg, 25 mg, Oral, BID, Myrtle Bolanos MD, 25 mg at 10/11/21 1127    metroNIDAZOLE (FLAGYL) tablet 500 mg, 500 mg, Oral, Q8H [address], Chey Lay MD, 500 mg at 10/12/21 0535    ondansetron (ZOFRAN) injection 4 mg, 4 mg, Intravenous, Q6H PRN, Colten Blanca MD    saccharomyces boulardii (FLORASTOR) capsule 250 mg, 250 mg, Oral, Daily, Colten Blanca MD, 250 mg at 10/11/21 1127    senna (SENOKOT) tablet 8 6 mg, 1 tablet, Oral, Daily, Colten Blanca MD    sodium bicarbonate tablet 650 mg, 650 mg, Oral, BID after meals, Colten Blanca MD, 650 mg at 10/11/21 1738    Laboratory Results:  Results from last 7 days   Lab Units 10/12/21  0145 10/11/21  1652 10/11/21  1059 10/11/21  0337 10/11/21  0028 10/10/21  1647 10/10/21  0621 10/09/21  1447   WBC Thousand/uL 3 96*  --   --  4 85  --   --  4 97 6 15   HEMOGLOBIN g/dL 9 3* 9 2* 8 5* 6 9* 6 7* 7 2* 7 1* 9 3*   HEMATOCRIT % 29 7* 29 0* 27 1* 22 0* 21 5* 23 0* 23 2* 29 8*   PLATELETS Thousands/uL 244  --   --  212  --   --  187 276   POTASSIUM mmol/L 3 3*  --   --  3 0*  --   --  3 8 3 2*   CHLORIDE mmol/L 115*  --   --  113*  --   --  109* 110*   CO2 mmol/L 15*  --   --  20*  --   --  18* 16*   BUN mg/dL 53*  --   --  51*  --   --  44* 37*   CREATININE mg/dL 3 63*  --   --  3 55*  --   --  3 60* 3 26*   CALCIUM mg/dL 8 0*  --   --  7 9*  --   --  8 5 9 3   MAGNESIUM mg/dL 2 3  --   --  1 7  --   --  1 7  --    PHOSPHORUS mg/dL 3 2  --   --  5 0*  --   --  4 9*  --

## 2021-10-12 NOTE — ASSESSMENT & PLAN NOTE
Left renal hematoma:   Presented with left renal hematoma  Pigtail catheter is noted medial to kidney  Renal pelvis may be collapsed  Hemorrhage and thickening extending in the combined interfascial place of retrospective as well as some high density fluid within  Urology on board  No plan for intervention per urology at this time  Observation and treat the infection  Hemoglobin is 9 3       Low threshold for repeat imaging if pain or anemia worsens/progresses

## 2021-10-12 NOTE — PLAN OF CARE
Problem: MOBILITY - ADULT  Goal: Maintain or return to baseline ADL function  Description: INTERVENTIONS:  -  Assess patient's ability to carry out ADLs; assess patient's baseline for ADL function and identify physical deficits which impact ability to perform ADLs (bathing, care of mouth/teeth, toileting, grooming, dressing, etc )  - Assess/evaluate cause of self-care deficits   - Assess range of motion  - Assess patient's mobility; develop plan if impaired  - Assess patient's need for assistive devices and provide as appropriate  - Encourage maximum independence but intervene and supervise when necessary  - Involve family in performance of ADLs  - Assess for home care needs following discharge   - Consider OT consult to assist with ADL evaluation and planning for discharge  - Provide patient education as appropriate  Outcome: Progressing  Goal: Maintains/Returns to pre admission functional level  Description: INTERVENTIONS:  - Perform BMAT or MOVE assessment daily    - Set and communicate daily mobility goal to care team and patient/family/caregiver  - Collaborate with rehabilitation services on mobility goals if consulted  - Perform Range of Motion 3 times a day  - Reposition patient every 2 hours    - Record patient progress and toleration of activity level   Outcome: Progressing     Problem: Potential for Falls  Goal: Patient will remain free of falls  Description: INTERVENTIONS:  - Educate patient/family on patient safety including physical limitations  - Instruct patient to call for assistance with activity   - Consult OT/PT to assist with strengthening/mobility   - Keep Call bell within reach  - Keep bed low and locked with side rails adjusted as appropriate  - Keep care items and personal belongings within reach  - Initiate and maintain comfort rounds  - Make Fall Risk Sign visible to staff  - Offer Toileting every 2 Hours, in advance of need  - Initiate/Maintain bed alarm  - Obtain necessary fall risk management equipment  - Apply yellow socks and bracelet for high fall risk patients  - Consider moving patient to room near nurses station  Outcome: Progressing     Problem: Prexisting or High Potential for Compromised Skin Integrity  Goal: Skin integrity is maintained or improved  Description: INTERVENTIONS:  - Identify patients at risk for skin breakdown  - Assess and monitor skin integrity  - Assess and monitor nutrition and hydration status  - Monitor labs   - Assess for incontinence   - Turn and reposition patient  - Assist with mobility/ambulation  - Relieve pressure over bony prominences  - Avoid friction and shearing  - Provide appropriate hygiene as needed including keeping skin clean and dry  - Evaluate need for skin moisturizer/barrier cream  - Collaborate with interdisciplinary team   - Patient/family teaching  - Consider wound care consult   Outcome: Progressing

## 2021-10-12 NOTE — PROGRESS NOTES
[facility] [facility]  Progress Note - Sharon Push [birth year], 77 y o  female MRN: [id number]  Unit/Bed#: [facility] 929-01 Encounter: [id number]  Primary Care Provider: Kamille Mcintosh MD   Date and time admitted to hospital: 10/9/2021  2:19 PM    Localized swelling of right upper extremity  Assessment & Plan  Pt presented with right upper extremity swelling  Vascular surgery consulted on admission  IR also consulted for fistulagram, plan to do fistulagram once patient is more stable  US doppler showed: There is a >50% stenosis noted in the proximal subclavian vein  There is a >50%  stenosis noted the proximal basilic vein, which also torturous  The dialysis AVF appears to be matured with adequate diameter, flow volumes and  less than 6mm in depth throughout the arm  Pleural effusion  Assessment & Plan  Mild to moderate pleural effusion on CXR  Discussed with IR who recommended placing thoracentesis order  They will evaluate for drainage    Acute renal failure superimposed on stage 5 chronic kidney disease, not on chronic dialysis [facility])  Assessment & Plan  Lab Results   Component Value Date    EGFR 12 10/12/2021    EGFR 12 10/11/2021    EGFR 12 10/10/2021    CREATININE 3 63 (H) 10/12/2021    CREATININE 3 55 (H) 10/11/2021    CREATININE 3 60 (H) 10/10/2021   creatinine is elevated  Will continue to follow  Sepsis ([address] )  Assessment & Plan  Sepsis, POA   Patient presented with progressing flank/abdominal discomfort likely due to Left renal hematoma with chronic obstructive uropathy  Now BC growing Fusobacterium mortiferum - discussed with ID - they will adjust abx as needed  C  Diff was ordered in setting of diarrhea, which was negative  Diarrhea probably 2/2 abx use    Anemia due to stage 5 chronic kidney disease, not on chronic dialysis [facility])  Assessment & Plan    Anemia 2/2 CKD with component of acute blood loss anemia  Transfused  Now 9 3  Will continue to follow  Polycythemia vera ([facility] [address] )  Assessment & Plan  Hx of Polycythemia vera and MDS  Significant anemia secondary to blood loss in the past   The patient is currently off of the [facility] treatment for her PV and getting mainly Aranesp on every 28 day basis  Hematology consulted for evaluation, recommend once patient is better/ stable, discharge, she will follow-up with Dr Duc Arellano,  her primary hematologist     Hypertension  Assessment & Plan  Continue with metoprolol 25 mg bid    Obstructive uropathy  Assessment & Plan  Obstructive nephropathy  Chronic bilateral hydronephrosis stents were recently removed  Urology on board    Chronic saddle pulmonary embolism ([facility] [address] )  Assessment & Plan  Chronic saddle pulmonary embolus  Eliquis 2 5 mg bid at home, currently on hold for now if IR wants to do intervention  Will discuss with specialists regarding restarting eliquis  * Renal hematoma, left  Assessment & Plan    Left renal hematoma:   Presented with left renal hematoma  Pigtail catheter is noted medial to kidney  Renal pelvis may be collapsed  Hemorrhage and thickening extending in the combined interfascial place of retrospective as well as some high density fluid within  Urology on board  No plan for intervention per urology at this time  Observation and treat the infection  Hemoglobin is 9 3  Low threshold for repeat imaging if pain or anemia worsens/progresses              VTE Pharmacologic Prophylaxis: VTE Score: 13 Moderate Risk (Score 3-4) - Pharmacological DVT Prophylaxis Ordered: not on anything secondary to hematoma and concern for dropping hemoglobin        Patient Centered Rounds: I performed bedside rounds with nursing staff today  Discussions with Specialists or Other Care Team Provider: Discussed with ID  Education and Discussions with Family / Patient: Updated  (son) via phone  Son  Time Spent for Care: 30 minutes   More than 50% of total time spent on counseling and coordination of care as described above  Current Length of Stay: 3 day(s)  Current Patient Status: Inpatient   Certification Statement: The patient will continue to require additional inpatient hospital stay due to positive BC  Discharge Plan: Anticipate discharge in 24-48 hrs to unsure  Code Status: Level 1 - Full Code    Subjective:   Patient seen and examined   Feeling "good"   No new symptoms   No pain       Objective:     Vitals:   Temp (24hrs), Av 5 °F (36 9 °C), Min:98 5 °F (36 9 °C), Max:98 5 °F (36 9 °C)    Temp:  [98 5 °F (36 9 °C)] 98 5 °F (36 9 °C)  HR:  [] 98  Resp:  [18-24] 18  BP: ()/(56-68) 105/61  SpO2:  [90 %-96 %] 94 %  Body mass index is 37 24 kg/m²  Input and Output Summary (last 24 hours): Intake/Output Summary (Last 24 hours) at 10/12/2021 1044  Last data filed at 10/11/2021 1900  Gross per 24 hour   Intake --   Output 295 ml   Net -295 ml       Physical Exam:   Physical Exam  Constitutional:       General: She is not in acute distress  Appearance: She is ill-appearing  She is not toxic-appearing  HENT:      Head: Normocephalic  Eyes:      General: No scleral icterus  Right eye: No discharge  Left eye: No discharge  Pupils: Pupils are equal, round, and reactive to light  Cardiovascular:      Rate and Rhythm: Tachycardia present  Pulmonary:      Effort: Pulmonary effort is normal  No respiratory distress  Breath sounds: No stridor  No wheezing or rhonchi  Chest:      Chest wall: No tenderness  Abdominal:      General: Abdomen is flat  There is no distension  Palpations: There is no mass  Tenderness: There is no abdominal tenderness  There is no right CVA tenderness or guarding  Skin:     Capillary Refill: Capillary refill takes less than 2 seconds  Coloration: Skin is pale  Skin is not jaundiced  Findings: Bruising present  No lesion  Neurological:      General: No focal deficit present        Mental Status: She is alert  Cranial Nerves: No cranial nerve deficit  Sensory: No sensory deficit  Motor: No weakness  Coordination: Coordination normal       Gait: Gait normal       Deep Tendon Reflexes: Reflexes normal    Psychiatric:         Mood and Affect: Mood normal           Additional Data:     Labs:  Results from last 7 days   Lab Units 10/12/21  0145 10/11/21  0337   WBC Thousand/uL 3 96* 4 85   HEMOGLOBIN g/dL 9 3* 6 9*   HEMATOCRIT % 29 7* 22 0*   PLATELETS Thousands/uL 244 212   BANDS PCT %  --  4   LYMPHO PCT %  --  6*   MONO PCT %  --  3*   EOS PCT %  --  0     Results from last 7 days   Lab Units 10/12/21  0145 10/10/21  0621   SODIUM mmol/L 141 138   POTASSIUM mmol/L 3 3* 3 8   CHLORIDE mmol/L 115* 109*   CO2 mmol/L 15* 18*   BUN mg/dL 53* 44*   CREATININE mg/dL 3 63* 3 60*   ANION GAP mmol/L 11 11   CALCIUM mg/dL 8 0* 8 5   ALBUMIN g/dL 2 2* 3 0*   TOTAL BILIRUBIN mg/dL  --  1 60*   ALK PHOS U/L  --  81   ALT U/L  --  15   AST U/L  --  72*   GLUCOSE RANDOM mg/dL 66 107     Results from last 7 days   Lab Units 10/11/21  0337   INR  2 00*             Results from last 7 days   Lab Units 10/10/21  0621 10/09/21  1447   LACTIC ACID mmol/L  --  1 7   PROCALCITONIN ng/ml 78 78* 21 02*       Lines/Drains:  Invasive Devices     Peripherally Inserted Central Catheter Line            PICC Line 10/11/21 <1 day          Central Venous Catheter Line            CVC Central Lines 10/09/21 Triple Left Femoral 2 days    CVC Central Lines 10/11/21 Double <1 day          Line            Hemodialysis AV Fistula 09/30/21 Right Upper arm 12 days          Drain            Urostomy Ileal conduit RUQ 1833 days                Central Line:  Goal for removal: No longer needed  Will place order to discontinue               Imaging: Reviewed radiology reports from this admission including: chest xray    Recent Cultures (last 7 days):   Results from last 7 days   Lab Units 10/12/21  0156 10/11/21  1851 10/11/21  0222 10/10/21  1256 10/09/21  1447 10/09/21  1444   BLOOD CULTURE  Received in Microbiology Lab  Culture in Progress  Received in Microbiology Lab  Culture in Progress    --   --   --  Fusobacterium mortiferum* Fusobacterium mortiferum*   GRAM STAIN RESULT   --  1+ Polys  No organisms seen  --   --  Gram negative rods* Gram negative rods*   URINE CULTURE   --   --   --  >100,000 cfu/ml Klebsiella pneumoniae*  >100,000 cfu/ml Enterobacter aerogenes*  20,000-29,000 cfu/ml   --   --    C DIFF TOXIN B BY PCR   --   --  Negative  --   --   --        Last 24 Hours Medication List:   Current Facility-Administered Medications   Medication Dose Route Frequency Provider Last Rate    acetaminophen  650 mg Oral Q6H PRN Bradly Farah MD      acetaminophen  975 mg Oral Q8H [address] Bradly Farah MD      aluminum-magnesium hydroxide-simethicone  30 mL Oral Q6H PRN Bradly Farah MD      atorvastatin  40 mg Oral HS Bradly Farah MD      calcitriol  0 25 mcg Oral Daily Bradly Farah MD      cefepime  1,000 mg Intravenous Q12H Demetrice Harris MD 1,000 mg (10/12/21 0932)    cholecalciferol  800 Units Oral Daily Bradly Farah MD      citalopram  20 mg Oral Daily Bradly Farah MD      docusate sodium  100 mg Oral BID Bradly Farah MD      fentanyl citrate (PF)  25 mcg Intravenous Q2H PRN Bradly Farah MD      levothyroxine  75 mcg Oral Daily Bradly Farah MD      melatonin  3 mg Oral HS Bradly Farah MD      metoprolol tartrate  25 mg Oral BID Bradly Farah MD      metroNIDAZOLE  500 mg Oral [facility] Demetrice Harris MD      ondansetron  4 mg Intravenous Q6H PRN Bradly Farah MD      saccharomyces boulardii  250 mg Oral Daily Bradly Farah MD      senna  1 tablet Oral Daily Bradly Farah MD      sodium bicarbonate  650 mg Oral BID after meals Bradly Farah MD          Today, Patient Was Seen By: Susan Leonardo MD    **Please Note: This note may have been constructed using a voice recognition system  **

## 2021-10-12 NOTE — ASSESSMENT & PLAN NOTE
Lab Results   Component Value Date    EGFR 12 10/12/2021    EGFR 12 10/11/2021    EGFR 12 10/10/2021    CREATININE 3 63 (H) 10/12/2021    CREATININE 3 55 (H) 10/11/2021    CREATININE 3 60 (H) 10/10/2021   creatinine is elevated  Will continue to follow

## 2021-10-12 NOTE — ASSESSMENT & PLAN NOTE
Sepsis, POA   Patient presented with progressing flank/abdominal discomfort likely due to Left renal hematoma with chronic obstructive uropathy  Now BC growing Fusobacterium mortiferum - discussed with ID - they will adjust abx as needed  C  Diff was ordered in setting of diarrhea, which was negative   Diarrhea probably 2/2 abx use

## 2021-10-12 NOTE — ASSESSMENT & PLAN NOTE
Chronic saddle pulmonary embolus  Eliquis 2 5 mg bid at home, currently on hold for now if IR wants to do intervention  Will discuss with specialists regarding restarting eliquis

## 2021-10-12 NOTE — ASSESSMENT & PLAN NOTE
Hx of Polycythemia vera and MDS  Significant anemia secondary to blood loss in the past   The patient is currently off of the [facility] treatment for her PV and getting mainly Aranesp on every 28 day basis  Hematology consulted for evaluation, recommend once patient is better/ stable, discharge, she will follow-up with Dr Mani Schroeder,  her primary hematologist

## 2021-10-12 NOTE — ASSESSMENT & PLAN NOTE
Anemia 2/2 CKD with component of acute blood loss anemia  Transfused  Now 9 3  Will continue to follow

## 2021-10-13 ENCOUNTER — APPOINTMENT (INPATIENT)
Dept: RADIOLOGY | Facility: HOSPITAL | Age: 76
DRG: 981 | End: 2021-10-13
Payer: COMMERCIAL

## 2021-10-13 LAB
ALBUMIN SERPL BCP-MCNC: 2 G/DL (ref 3.5–5)
ALP SERPL-CCNC: 244 U/L (ref 46–116)
ALT SERPL W P-5'-P-CCNC: 15 U/L (ref 12–78)
ANION GAP SERPL CALCULATED.3IONS-SCNC: 10 MMOL/L (ref 4–13)
APTT PPP: 45 SECONDS (ref 23–37)
AST SERPL W P-5'-P-CCNC: 90 U/L (ref 5–45)
BACTERIA UR CULT: ABNORMAL
BILIRUB SERPL-MCNC: 1.15 MG/DL (ref 0.2–1)
BUN SERPL-MCNC: 65 MG/DL (ref 5–25)
CALCIUM ALBUM COR SERPL-MCNC: 9.5 MG/DL (ref 8.3–10.1)
CALCIUM SERPL-MCNC: 7.9 MG/DL (ref 8.3–10.1)
CHLORIDE SERPL-SCNC: 114 MMOL/L (ref 100–108)
CO2 SERPL-SCNC: 18 MMOL/L (ref 21–32)
CREAT SERPL-MCNC: 4.15 MG/DL (ref 0.6–1.3)
ERYTHROCYTE [DISTWIDTH] IN BLOOD BY AUTOMATED COUNT: 17.5 % (ref 11.6–15.1)
GFR SERPL CREATININE-BSD FRML MDRD: 10 ML/MIN/1.73SQ M
GLUCOSE SERPL-MCNC: 158 MG/DL (ref 65–140)
HCT VFR BLD AUTO: 26.3 % (ref 34.8–46.1)
HGB BLD-MCNC: 8.3 G/DL (ref 11.5–15.4)
INR PPP: 1.6 (ref 0.84–1.19)
LACTATE SERPL-SCNC: 1.8 MMOL/L (ref 0.5–2)
MAGNESIUM SERPL-MCNC: 2.4 MG/DL (ref 1.6–2.6)
MCH RBC QN AUTO: 28.7 PG (ref 26.8–34.3)
MCHC RBC AUTO-ENTMCNC: 31.6 G/DL (ref 31.4–37.4)
MCV RBC AUTO: 91 FL (ref 82–98)
PHOSPHATE SERPL-MCNC: 2.3 MG/DL (ref 2.3–4.1)
PLATELET # BLD AUTO: 243 THOUSANDS/UL (ref 149–390)
PMV BLD AUTO: 10.7 FL (ref 8.9–12.7)
POTASSIUM SERPL-SCNC: 3.6 MMOL/L (ref 3.5–5.3)
PROT SERPL-MCNC: 5.8 G/DL (ref 6.4–8.2)
PROTHROMBIN TIME: 18.3 SECONDS (ref 11.6–14.5)
RBC # BLD AUTO: 2.89 MILLION/UL (ref 3.81–5.12)
SODIUM SERPL-SCNC: 142 MMOL/L (ref 136–145)
WBC # BLD AUTO: 7.21 THOUSAND/UL (ref 4.31–10.16)

## 2021-10-13 PROCEDURE — 71250 CT THORAX DX C-: CPT

## 2021-10-13 PROCEDURE — 85730 THROMBOPLASTIN TIME PARTIAL: CPT | Performed by: RADIOLOGY

## 2021-10-13 PROCEDURE — 87186 SC STD MICRODIL/AGAR DIL: CPT | Performed by: RADIOLOGY

## 2021-10-13 PROCEDURE — 84100 ASSAY OF PHOSPHORUS: CPT | Performed by: INTERNAL MEDICINE

## 2021-10-13 PROCEDURE — 97535 SELF CARE MNGMENT TRAINING: CPT

## 2021-10-13 PROCEDURE — 99233 SBSQ HOSP IP/OBS HIGH 50: CPT | Performed by: INTERNAL MEDICINE

## 2021-10-13 PROCEDURE — NC001 PR NO CHARGE: Performed by: PHYSICIAN ASSISTANT

## 2021-10-13 PROCEDURE — 97530 THERAPEUTIC ACTIVITIES: CPT

## 2021-10-13 PROCEDURE — 87075 CULTR BACTERIA EXCEPT BLOOD: CPT | Performed by: RADIOLOGY

## 2021-10-13 PROCEDURE — 87077 CULTURE AEROBIC IDENTIFY: CPT | Performed by: RADIOLOGY

## 2021-10-13 PROCEDURE — 0W9H30Z DRAINAGE OF RETROPERITONEUM WITH DRAINAGE DEVICE, PERCUTANEOUS APPROACH: ICD-10-PCS | Performed by: RADIOLOGY

## 2021-10-13 PROCEDURE — 0W9G30Z DRAINAGE OF PERITONEAL CAVITY WITH DRAINAGE DEVICE, PERCUTANEOUS APPROACH: ICD-10-PCS | Performed by: RADIOLOGY

## 2021-10-13 PROCEDURE — G1004 CDSM NDSC: HCPCS

## 2021-10-13 PROCEDURE — 99232 SBSQ HOSP IP/OBS MODERATE 35: CPT | Performed by: INTERNAL MEDICINE

## 2021-10-13 PROCEDURE — 97110 THERAPEUTIC EXERCISES: CPT

## 2021-10-13 PROCEDURE — 74176 CT ABD & PELVIS W/O CONTRAST: CPT

## 2021-10-13 PROCEDURE — 80053 COMPREHEN METABOLIC PANEL: CPT | Performed by: INTERNAL MEDICINE

## 2021-10-13 PROCEDURE — 87205 SMEAR GRAM STAIN: CPT | Performed by: RADIOLOGY

## 2021-10-13 PROCEDURE — 83605 ASSAY OF LACTIC ACID: CPT | Performed by: INTERNAL MEDICINE

## 2021-10-13 PROCEDURE — 49405 IMAGE CATH FLUID COLXN VISC: CPT

## 2021-10-13 PROCEDURE — 85027 COMPLETE CBC AUTOMATED: CPT | Performed by: INTERNAL MEDICINE

## 2021-10-13 PROCEDURE — 83735 ASSAY OF MAGNESIUM: CPT | Performed by: INTERNAL MEDICINE

## 2021-10-13 PROCEDURE — C1729 CATH, DRAINAGE: HCPCS

## 2021-10-13 PROCEDURE — C1769 GUIDE WIRE: HCPCS

## 2021-10-13 PROCEDURE — 99222 1ST HOSP IP/OBS MODERATE 55: CPT | Performed by: PHYSICIAN ASSISTANT

## 2021-10-13 PROCEDURE — 85610 PROTHROMBIN TIME: CPT | Performed by: RADIOLOGY

## 2021-10-13 PROCEDURE — 99232 SBSQ HOSP IP/OBS MODERATE 35: CPT | Performed by: PHYSICIAN ASSISTANT

## 2021-10-13 PROCEDURE — 49406 IMAGE CATH FLUID PERI/RETRO: CPT | Performed by: RADIOLOGY

## 2021-10-13 PROCEDURE — 87070 CULTURE OTHR SPECIMN AEROBIC: CPT | Performed by: RADIOLOGY

## 2021-10-13 RX ORDER — SODIUM CHLORIDE 9 MG/ML
10 INJECTION INTRAVENOUS DAILY
Qty: 300 ML | Refills: 3 | Status: SHIPPED | OUTPATIENT
Start: 2021-10-13 | End: 2021-10-14 | Stop reason: SDUPTHER

## 2021-10-13 RX ORDER — FENTANYL CITRATE 50 UG/ML
INJECTION, SOLUTION INTRAMUSCULAR; INTRAVENOUS CODE/TRAUMA/SEDATION MEDICATION
Status: COMPLETED | OUTPATIENT
Start: 2021-10-13 | End: 2021-10-13

## 2021-10-13 RX ADMIN — METOPROLOL TARTRATE 25 MG: 25 TABLET, FILM COATED ORAL at 08:55

## 2021-10-13 RX ADMIN — METRONIDAZOLE 500 MG: 500 TABLET ORAL at 05:09

## 2021-10-13 RX ADMIN — METRONIDAZOLE 500 MG: 500 TABLET ORAL at 13:42

## 2021-10-13 RX ADMIN — ONDANSETRON 4 MG: 2 INJECTION INTRAMUSCULAR; INTRAVENOUS at 22:35

## 2021-10-13 RX ADMIN — ACETAMINOPHEN 975 MG: 325 TABLET, FILM COATED ORAL at 13:41

## 2021-10-13 RX ADMIN — SODIUM BICARBONATE 650 MG TABLET 650 MG: at 08:55

## 2021-10-13 RX ADMIN — SODIUM BICARBONATE 50 ML/HR: 84 INJECTION, SOLUTION INTRAVENOUS at 10:14

## 2021-10-13 RX ADMIN — SODIUM BICARBONATE 50 ML/HR: 84 INJECTION, SOLUTION INTRAVENOUS at 11:46

## 2021-10-13 RX ADMIN — ACETAMINOPHEN 975 MG: 325 TABLET, FILM COATED ORAL at 05:09

## 2021-10-13 RX ADMIN — FENTANYL CITRATE 50 MCG: 50 INJECTION INTRAMUSCULAR; INTRAVENOUS at 19:29

## 2021-10-13 RX ADMIN — Medication 250 MG: at 08:55

## 2021-10-13 RX ADMIN — ACETAMINOPHEN 975 MG: 325 TABLET, FILM COATED ORAL at 22:20

## 2021-10-13 RX ADMIN — METRONIDAZOLE 500 MG: 500 TABLET ORAL at 22:20

## 2021-10-13 RX ADMIN — CALCITRIOL 0.25 MCG: 0.25 CAPSULE, LIQUID FILLED ORAL at 08:55

## 2021-10-13 RX ADMIN — FENTANYL CITRATE 50 MCG: 50 INJECTION INTRAMUSCULAR; INTRAVENOUS at 19:07

## 2021-10-13 RX ADMIN — MELATONIN TAB 3 MG 3 MG: 3 TAB at 22:20

## 2021-10-13 RX ADMIN — CITALOPRAM HYDROBROMIDE 20 MG: 20 TABLET ORAL at 08:55

## 2021-10-13 RX ADMIN — ATORVASTATIN CALCIUM 40 MG: 40 TABLET, FILM COATED ORAL at 22:20

## 2021-10-13 RX ADMIN — ONDANSETRON 4 MG: 2 INJECTION INTRAMUSCULAR; INTRAVENOUS at 09:01

## 2021-10-13 RX ADMIN — LEVOTHYROXINE SODIUM 75 MCG: 75 TABLET ORAL at 08:55

## 2021-10-13 NOTE — ASSESSMENT & PLAN NOTE
Left renal hematoma:   Presented with left renal hematoma  Pigtail catheter is noted medial to kidney  Renal pelvis may be collapsed  Hemorrhage and thickening extending in the combined interfascial place of retrospective as well as some high density fluid within  Urology on board  No plan for intervention per urology at this time  Observation and treat the infection  Hemoglobin is 9 3     Kidneys are stable on CT scan

## 2021-10-13 NOTE — ASSESSMENT & PLAN NOTE
Anemia 2/2 CKD with component of acute blood loss anemia  Transfused  Now 8 3 in the setting of IVF  Will continue to follow

## 2021-10-13 NOTE — BRIEF OP NOTE (RAD/CATH)
INTERVENTIONAL RADIOLOGY PROCEDURE NOTE    Date: 10/13/2021    Procedure:   IR DRAINAGE x2    Preoperative diagnosis:   1  Renal hematoma    2  Sepsis ([address] )    3  Pleural effusion    4  Iron deficiency anemia due to chronic blood loss    5  Intra-abdominal collection         Postoperative diagnosis: Same  Surgeon: Jade Wheeler MD     Assistant: None  No qualified resident was available  Blood loss: minimal     Specimens:     Left flank collection Culture aerobic and anaerobic    Left perinephric collection Culture aerobic and anaerobic      Findings:   Ten Serbian drain placed into left perinephric collection  Thin bloody fluid    Ten Serbian drain placed into left flank collection  Thin bloody fluid    Complications: None immediate  Anesthesia: local and IV Fentanyl     Note:  Patient appears ill, unwell and far different from her usual self    I am concerned she is at higher risk of decompensation and should be monitored closely, this was related to the primary team

## 2021-10-13 NOTE — ASSESSMENT & PLAN NOTE
Obstructive nephropathy  Chronic bilateral hydronephrosis stents were recently removed  Urology on board  Stable

## 2021-10-13 NOTE — PROGRESS NOTES
[facility]  Progress Note - Toni Amen [birth year], 77 y o  female MRN: [id number]  Unit/Bed#: [facility] 929-01 Encounter: [id number]  Primary Care Provider: Oh Do MD   Date and time admitted to hospital: 10/9/2021  2:19 PM    * Sepsis [facility])  Assessment & Plan  Sepsis POA  Poor source control with abscess/loculated collection seen on CT scan today  Discussed with surgery and IR  IR considering drainage today,   Surgery saw that patient and are in discussion with IR  Check lactic acid  Localized swelling of right upper extremity  Assessment & Plan  Pt presented with right upper extremity swelling  Vascular surgery consulted on admission  IR also consulted for fistulagram, plan to do fistulagram once patient is more stable  US doppler showed: There is a >50% stenosis noted in the proximal subclavian vein  There is a >50%  stenosis noted the proximal basilic vein, which also torturous  The dialysis AVF appears to be matured with adequate diameter, flow volumes and  less than 6mm in depth throughout the arm  Vascular and IR following  Pleural effusion  Assessment & Plan  Mild to moderate pleural effusion on CXR  Discussed with IR who recommended placing thoracentesis order  They will evaluate for drainage    IR thoracentesis with -- drained  Review thoro labs  Consider consult to pulm  Renal hematoma, left  Assessment & Plan    Left renal hematoma:   Presented with left renal hematoma  Pigtail catheter is noted medial to kidney  Renal pelvis may be collapsed  Hemorrhage and thickening extending in the combined interfascial place of retrospective as well as some high density fluid within  Urology on board  No plan for intervention per urology at this time  Observation and treat the infection  Hemoglobin is 9 3     Kidneys are stable on CT scan          Acute renal failure superimposed on stage 5 chronic kidney disease, not on chronic dialysis [facility])  Assessment & Plan  Lab Results   Component Value Date    EGFR 10 10/13/2021    EGFR 12 10/12/2021    EGFR 12 10/11/2021    CREATININE 4 15 (H) 10/13/2021    CREATININE 3 63 (H) 10/12/2021    CREATININE 3 55 (H) 10/11/2021   worsening RF  Discussed with urology and nephrology   CT scan performed  Now in view on findings - likely secondary to sepsis     Anemia due to stage 5 chronic kidney disease, not on chronic dialysis [facility])  Assessment & Plan    Anemia 2/2 CKD with component of acute blood loss anemia  Transfused  Now 8 3 in the setting of IVF  Will continue to follow  Polycythemia vera ([address] )  Assessment & Plan  Hx of Polycythemia vera and MDS  Significant anemia secondary to blood loss in the past   The patient is currently off of the [facility] treatment for her PV and getting mainly Aranesp on every 28 day basis  Hematology consulted for evaluation, recommend once patient is better/ stable, discharge, she will follow-up with Dr Ghulam Washington,  her primary hematologist     Hypertension  Assessment & Plan  Continue with metoprolol 25 mg bid  Hold for low BP  Last night hypotensive with molina continue with hold parameters  Obstructive uropathy  Assessment & Plan  Obstructive nephropathy  Chronic bilateral hydronephrosis stents were recently removed  Urology on board  Stable  Chronic saddle pulmonary embolism ([facility])  Assessment & Plan  Chronic saddle pulmonary embolus  Eliquis 2 5 mg bid at home, currently on hold for now if IR wants to do intervention  Will discuss with specialists regarding restarting eliquis  VTE Pharmacologic Prophylaxis: VTE Score: 13 Moderate Risk (Score 3-4) - Pharmacological DVT Prophylaxis Ordered: contraindicated at present  Patient Centered Rounds: I performed bedside rounds with nursing staff today  Discussions with Specialists or Other Care Team Provider: discussed with surgery, renal, IR       Education and Discussions with Family / Patient: Updated  (son) via phone  Time Spent for Care: 45 minutes  More than 50% of total time spent on counseling and coordination of care as described above  Current Length of Stay: 4 day(s)  Current Patient Status: Inpatient   Certification Statement: The patient will continue to require additional inpatient hospital stay due to sick, sepsis  Discharge Plan: unclear  Code Status: Level 1 - Full Code    Subjective:   Patient seen and examined   AAOx3 but lethargic  No abdominal paiin      Objective:     Vitals:   Temp (24hrs), Av 9 °F (36 6 °C), Min:97 4 °F (36 3 °C), Max:98 9 °F (37 2 °C)    Temp:  [97 4 °F (36 3 °C)-98 9 °F (37 2 °C)] 98 9 °F (37 2 °C)  HR:  [] 67  Resp:  [18-20] 20  BP: ()/(51-93) 98/51  SpO2:  [94 %-95 %] 94 %  Body mass index is 37 24 kg/m²  Input and Output Summary (last 24 hours): Intake/Output Summary (Last 24 hours) at 10/13/2021 1750  Last data filed at 10/13/2021 1355  Gross per 24 hour   Intake 0 ml   Output 520 ml   Net -520 ml       Physical Exam:   Physical Exam  Constitutional:       Appearance: She is ill-appearing  HENT:      Mouth/Throat:      Pharynx: No oropharyngeal exudate  Cardiovascular:      Rate and Rhythm: Tachycardia present  Pulmonary:      Effort: Pulmonary effort is normal    Abdominal:      General: Abdomen is flat  Musculoskeletal:         General: No swelling or deformity  Right lower leg: No edema  Skin:     Coloration: Skin is pale  Skin is not jaundiced  Findings: No bruising or lesion  Neurological:      General: No focal deficit present  Mental Status: She is alert     Psychiatric:         Mood and Affect: Mood normal           Additional Data:     Labs:  Results from last 7 days   Lab Units 10/13/21  0517 10/11/21  1059 10/11/21  0337   WBC Thousand/uL 7 21   < > 4 85   HEMOGLOBIN g/dL 8 3*  --  6 9*   HEMATOCRIT % 26 3*  --  22 0*   PLATELETS Thousands/uL 243   < > 212   BANDS PCT %  --   --  4 LYMPHO PCT %  --   --  6*   MONO PCT %  --   --  3*   EOS PCT %  --   --  0    < > = values in this interval not displayed  Results from last 7 days   Lab Units 10/13/21  0517   SODIUM mmol/L 142   POTASSIUM mmol/L 3 6   CHLORIDE mmol/L 114*   CO2 mmol/L 18*   BUN mg/dL 65*   CREATININE mg/dL 4 15*   ANION GAP mmol/L 10   CALCIUM mg/dL 7 9*   ALBUMIN g/dL 2 0*   TOTAL BILIRUBIN mg/dL 1 15*   ALK PHOS U/L 244*   ALT U/L 15   AST U/L 90*   GLUCOSE RANDOM mg/dL 158*     Results from last 7 days   Lab Units 10/11/21  0337   INR  2 00*             Results from last 7 days   Lab Units 10/13/21  1539 10/10/21  0621 10/09/21  1447   LACTIC ACID mmol/L 1 8  --  1 7   PROCALCITONIN ng/ml  --  78 78* 21 02*       Lines/Drains:  Invasive Devices     Peripherally Inserted Central Catheter Line            PICC Line 10/11/21 1 day          Line            Hemodialysis AV Fistula 09/30/21 Right Upper arm 13 days          Drain            Urostomy Ileal conduit RUQ 1834 days    Rectal Tube With balloon <1 day                Central Line:  Goal for removal: acutely ill  Imaging: Personally reviewed the following imaging: chest CT scan    Recent Cultures (last 7 days):   Results from last 7 days   Lab Units 10/12/21  0156 10/11/21  1851 10/11/21  0222 10/10/21  1256 10/09/21  1447 10/09/21  1444   BLOOD CULTURE  No Growth at 24 hrs    No Growth at 24 hrs   --   --   --  Fusobacterium mortiferum* Fusobacterium mortiferum*   GRAM STAIN RESULT   --  1+ Polys  No organisms seen  --   --  Gram negative rods* Gram negative rods*   URINE CULTURE   --   --   --  >100,000 cfu/ml Klebsiella pneumoniae*  >100,000 cfu/ml Enterobacter aerogenes*  20,000-29,000 cfu/ml Enterococcus faecalis*  <10,000 cfu/ml Enterococcus avium*  --   --    BODY FLUID CULTURE, STERILE   --  No growth  --   --   --   --    C DIFF TOXIN B BY PCR   --   --  Negative  --   --   --        Last 24 Hours Medication List:   Current Facility-Administered Medications   Medication Dose Route Frequency Provider Last Rate    acetaminophen  650 mg Oral Q6H PRN Myrtle Bolanso MD      acetaminophen  975 mg Oral [facility] Myrtle Bolanos MD      aluminum-magnesium hydroxide-simethicone  30 mL Oral Q6H PRN Myrtle Bolanos MD      atorvastatin  40 mg Oral HS Myrtle Bolanos MD      calcitriol  0 25 mcg Oral Daily Myrtle Bolanos MD      cholecalciferol  800 Units Oral Daily Myrtle Bolanos MD      citalopram  20 mg Oral Daily Myrtle Bolanos MD      docusate sodium  100 mg Oral BID Myrtle Bolanos MD      fentanyl citrate (PF)  25 mcg Intravenous Q2H PRN Myrtle Bolanos MD      levothyroxine  75 mcg Oral Daily Myrtle Bolanos MD      melatonin  3 mg Oral HS Myrtle Bolanos MD      metoprolol tartrate  25 mg Oral BID Zechariah Martinez MD      metroNIDAZOLE  500 mg Oral [facility] Chey Lay MD      ondansetron  4 mg Intravenous Q6H PRN Myrtle Bolanos MD      saccharomyces boulardii  250 mg Oral Daily Myrtle Bloanos MD      senna  1 tablet Oral Daily Myrtle Bolanos MD      sodium bicarbonate infusion  50 mL/hr Intravenous Continuous Lencho Alberto MD 50 mL/hr (10/13/21 1146)    sodium bicarbonate  650 mg Oral BID after meals Myrtle Bolanos MD          Today, Patient Was Seen By: Zechariah Martinez MD    **Please Note: This note may have been constructed using a voice recognition system  **

## 2021-10-13 NOTE — NURSING NOTE
Entered pt room to draw labs and found pt to be more lethargic and confused  Pt able to tell me her name and birthday but stated she was in a bank and the year was 0  VSS, pt offers no complaints  After further conversation pt able to stay awake for duration of assessment and oriented x4  Dr Wanda Monroy made aware

## 2021-10-13 NOTE — WOUND OSTOMY CARE
Consult Note - Wound   Courtney Eubanks 77 y o  female MRN: [id number]  Unit/Bed#: [facility] 929-01 Encounter: [id number]      History and Present Illness: Patient is seen for wound care consult today   The patient is a 77year old female that was seen on Monday and re-consulted for a new area for assessment   Patient is a Min A to roll in the bed   Kayleigh Carmichael is in place and has a urostomy hooked to a camp bag   Continent at this time   Assessment Findings:   1  Bilateral heels dry and intact   2  Sacral dry and intact   3  Right buttocks small partial thickness area that is pink and blanchable   4  Left arm partial thickness skin tear appeared with attached flap of skin   5  Mid chest area category 3 skin tear pink fragile tissue of partial thickness     Discussed plan with the bedside RN       Skin care plans:  1- Sacral buttocks apply hydraguard 2 times per day and if needed   2-Hydraguard to bilateral heel 2 times per day and if needed   3-Elevate heels to offload pressure  4-Ehob cushion when out of bed  5-Turn/repoisiton every 2 hours  or when medically stable for pressure re-distribution on skin  6-Moisturize skin daily with skin nourishing cream   7-Cleanse L arm  cleanse with  NSS, dermagran to wound bed, DSD and Summer to arm  Change every other day  8  Mid chest area - cleanse with Normal saline then apply adaptic and apply allevyn foam amado with a T and date change every other day            Vitals: Blood pressure 136/93, pulse (!) 124, temperature (!) 97 4 °F (36 3 °C), resp  rate 18, height 5' (1 524 m), weight 86 5 kg (190 lb 11 2 oz), SpO2 95 %, not currently breastfeeding  ,Body mass index is 37 24 kg/m²  Wound 09/30/21 Arm Right (Active)   Wound Image   10/11/21 1029   Wound Description Clean;Dry 10/13/21 0830   Fina-wound Assessment Clean;Dry; Intact 10/11/21 1029   Wound Length (cm) 7 cm 10/11/21 1029   Wound Width (cm) 0 1 cm 10/11/21 1029   Wound Depth (cm) 0 cm 10/11/21 1029 Wound Surface Area (cm^2) 0 7 cm^2 10/11/21 1029   Wound Volume (cm^3) 0 cm^3 10/11/21 1029   Calculated Wound Volume (cm^3) 0 cm^3 10/11/21 1029   Treatments Cleansed 10/11/21 1029   Dressing Open to air 10/13/21 0830   Patient Tolerance Tolerated well 10/11/21 1029       Wound 10/01/21 Skin Tear Arm Left (Active)   Wound Image   10/13/21 1430   Wound Description Dry;Fragile 10/13/21 1430   Fina-wound Assessment Clean;Dry; Intact 10/13/21 1430   Wound Length (cm) 2 5 cm 10/13/21 1430   Wound Width (cm) 3 cm 10/13/21 1430   Wound Depth (cm) 0 1 cm 10/13/21 1430   Wound Surface Area (cm^2) 7 5 cm^2 10/13/21 1430   Wound Volume (cm^3) 0 75 cm^3 10/13/21 1430   Calculated Wound Volume (cm^3) 0 75 cm^3 10/13/21 1430   Wound Site Closure Approximated 10/13/21 0830   Drainage Amount Scant 10/13/21 1430   Drainage Description Serosanguineous 10/13/21 1430   Non-staged Wound Description Partial thickness 10/13/21 1430   Treatments Cleansed;Site care 10/13/21 1430   Dressing Dermagran gauze 10/13/21 1430   Dressing Changed Changed 10/13/21 1430   Patient Tolerance Tolerated well 10/13/21 1430   Dressing Status Clean;Dry; Intact 10/13/21 0830       Wound 10/09/21 Skin Tear Sternum (Active)   Wound Image   10/13/21 1432   Wound Description Clean;Fragile;Pink 10/13/21 1432   Fina-wound Assessment Clean;Dry; Intact 10/13/21 1432   Wound Length (cm) 1 5 cm 10/13/21 1432   Wound Width (cm) 2 5 cm 10/13/21 1432   Wound Depth (cm) 0 1 cm 10/13/21 1432   Wound Surface Area (cm^2) 3 75 cm^2 10/13/21 1432   Wound Volume (cm^3) 0 375 cm^3 10/13/21 1432   Calculated Wound Volume (cm^3) 0 38 cm^3 10/13/21 1432   Drainage Amount Scant 10/13/21 1432   Drainage Description Serous 10/13/21 1432   Non-staged Wound Description Partial thickness 10/13/21 1432   Treatments Cleansed;Site care 10/13/21 1432   Dressing Foam, Silicon (eg  Allevyn, etc); Other (Comment) 10/13/21 1432   Dressing Changed Changed 10/13/21 1432   Patient Tolerance Tolerated well 10/13/21 1432   Dressing Status Clean;Dry; Intact 10/13/21 0830       Wound 10/13/21  Other (Comment) Buttocks Right (Active)   Wound Image   10/13/21 1442   Wound Description Clean;Fragile;Pink 10/13/21 1442   Pressure Injury Stage  10/13/21 0920   Fina-wound Assessment Clean;Dry; Intact 10/13/21 1442   Wound Length (cm) 0 3 cm 10/13/21 1442   Wound Width (cm) 0 3 cm 10/13/21 1442   Wound Depth (cm) 0 1 cm 10/13/21 1442   Wound Surface Area (cm^2) 0 09 cm^2 10/13/21 1442   Wound Volume (cm^3) 0 009 cm^3 10/13/21 1442   Calculated Wound Volume (cm^3) 0 01 cm^3 10/13/21 1442   Drainage Amount None 10/13/21 1442   Non-staged Wound Description Partial thickness 10/13/21 1442   Treatments Cleansed;Site care 10/13/21 1442   Dressing Moisture barrier 10/13/21 1442   Patient Tolerance Tolerated well 10/13/21 1442       Wound care will follow weekly call or tiger text with concerns     Toya Rutledge RN BSN CWOCN

## 2021-10-13 NOTE — CONSULTS
Consultation - General Surgery   Domingo Guthrie 77 y o  female MRN: [id number]  Unit/Bed#: [facility] 929-01 Encounter: [id number]    Assessment/Plan   Assessment:  43-year-old female with PMHx of hypertension, CKD stage 4, history of PE, incontinence , previous history of small-bowel obstruction, IBS, and COVID-19 who was admitted to [facility] on 10/9 w/ L renal hematoma, sepsis, and localized RUE swelling  Surgery consulted for CT scan findings of new abdominal loculated fluid collection as well as PSBO  Plan:  -Currently afebrile, without leukocytosis, intermittently tachycardic to the 120s, /93  Admits to mild RLQ pain near her ileal conduit, abdomen soft, non-distended, +BMs  -CT scan with evidence of increased dilation of the small bowel loops suggesting partial small bowel obstruction, new loculated fluid collection in the left paracolic gutter measuring 6 8 x 3 6 x 12 6 cm, another loculated fluid collection within the pelvic cul-de-sac, stable from last CT in 5/2021  -No acute surgical intervention warranted at this time  -Continue diet as tolerated as patient is having active BMs  -Recommend IR consult for collection drainage    History of Present Illness   HPI:  Domingo Guthrie is a 77 y o  female with PMHx of hypertension , CKD stage 4, history of PE , incontinence , previous history of small-bowel obstruction, IBS, and COVID-19 who was admitted to [facility] on 10/9 w/ L renal hematoma, sepsis, and localized RUE swelling  Surgery consulted for CT scan findings of new abdominal loculated fluid collection as well as PSBO  Patient currently denies any nausea or vomiting, admits to mild abdominal pain in the right lower quadrant near her ileal conduit, denies any bloating, agrees to having multiple BMs  Denies any fevers, chills, chest pain, shortness of breath       Previous abdominal surgical history includes cholecystectomy, radical cystectomy with ileal conduit in October of 2016, exploratory laparotomy, peristomal hernia repair with mesh, LALO and August of 2020, multiple IR nephrostomy tube check/changes most recently 10/04/2021  Inpatient consult to Acute Care Surgery  Consult performed by: Jaime Lara PA-C  Consult ordered by: Sánchez Castro MD        Review of Systems   Constitutional: Negative for activity change, chills and fever  HENT: Negative for congestion and sore throat  Respiratory: Negative for cough, shortness of breath and wheezing  Cardiovascular: Negative for chest pain and palpitations  Gastrointestinal: Positive for abdominal pain (RLQ near ileal conduit)  Negative for abdominal distention, constipation, diarrhea, nausea and vomiting  Genitourinary: Negative for difficulty urinating and flank pain  Musculoskeletal: Negative for back pain and neck pain  Skin: Negative for color change and rash  Neurological: Negative for weakness, light-headedness and headaches  Psychiatric/Behavioral: Negative for agitation and behavioral problems         Historical Information   Past Medical History:   Diagnosis Date    Anxiety     Bowel obstruction (HCC)     Chronic kidney disease     Chronic kidney disease (CKD), stage IV (severe) (HCC)     stage IV    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    Circulation problem     Compression fracture of cervical spine ([address] )     COVID-19 07/2021    hospitalized     Hernia of abdominal cavity     History of kidney problems     History of transfusion     Hydronephrosis     Hypertension     IBS (irritable bowel syndrome)     Incontinence     Lung mass     Improving on PET/CT 1/2016    Polycythemia vera ([address] )     Pulmonary embolism ([address] ) 2014    Shingles     Urinary tract infection      Past Surgical History:   Procedure Laterality Date    ABDOMINAL ADHESION SURGERY N/A 8/9/2020    Procedure: LYSIS ADHESIONS;  Surgeon: Criselda Lisa DO;  [facility];  Service: General    BLADDER SUSPENSION      BOTOX INJECTION N/A 7/27/2016    Procedure: BOTOX INJECTION ;  Surgeon: Tessa Mcrae MD;  [facility];  Service:    [address], RADICAL WITH ILEOCONDUIT N/A 10/4/2016    Procedure: SUPRATRIGONAL CYSTECTOMY WITH ILEAL CONDUIT ;  Surgeon: Tessa Mcrae MD;  [facility];  Service:    Mavis Cousin CYSTOSCOPY W/ RETROGRADES Bilateral 7/27/2016    Procedure: CYSTOSCOPY; RETROGRADE PYELOGRAM ;  Surgeon: Tessa Mcrae MD;  [facility];  Service:     HERNIA REPAIR      IR AV FISTULAGRAM/GRAFTOGRAM  2/10/2021    IR NEPHROSTOMY TO NEPHROURETERAL STENT  5/15/2021    IR NEPHROSTOMY TO NEPHROURETERAL STENT  6/9/2021    IR NEPHROSTOMY TUBE CHECK AND/OR REMOVAL  6/16/2021    IR NEPHROSTOMY TUBE CHECK/CHANGE/REPOSITION/REINSERTION/UPSIZE  5/14/2021    IR NEPHROSTOMY TUBE CHECK/CHANGE/REPOSITION/REINSERTION/UPSIZE  5/21/2021    IR NEPHROSTOMY TUBE CHECK/CHANGE/REPOSITION/REINSERTION/UPSIZE  9/16/2021    IR NEPHROSTOMY TUBE CHECK/CHANGE/REPOSITION/REINSERTION/UPSIZE  10/4/2021    IR NEPHROSTOMY TUBE PLACEMENT  5/10/2021    IR NEPHROSTOMY TUBE PLACEMENT  9/20/2021    IR NON-TUNNELED CENTRAL LINE PLACEMENT  7/17/2020    IR NON-TUNNELED CENTRAL LINE PLACEMENT  8/14/2020    IR NON-TUNNELED CENTRAL LINE PLACEMENT  7/16/2021    IR NON-TUNNELED CENTRAL LINE PLACEMENT  10/11/2021    IR THORACENTESIS  10/11/2021    LAPAROTOMY N/A 8/9/2020    Procedure: LAPAROTOMY EXPLORATORY, PARASTOMAL HERNIA REPAIR WITH MESH;  Surgeon: David Salmeron DO;  [facility];  Service: General    MN ANASTOMOSIS,AV,ANY SITE Right 1/5/2021    Procedure: Creation of right brachiobasilic fistula; Surgeon: Ke Chairez MD;  [facility];  Service: Vascular    MN COLONOSCOPY FLX DX W/COLLJ SPEC WHEN PFRMD N/A 8/31/2016    Procedure: COLONOSCOPY;  Surgeon: Linden Hermosillo MD;  [facility];   Service: Gastroenterology    MN CYSTOSCOPY,INSERT URETERAL STENT Bilateral 7/27/2016    Procedure: STENT INSERTION; EXCISION OF MESH ;  Surgeon: Kaity Reina MD;  [facility];  Service: Urology    SD REVISE AV FISTULA,W/O THROMBECTOMY Right 9/30/2021    Procedure: Superficialization and transposition of right brachiobasilic fistula;   Surgeon: Raquel Chairez MD;  [facility];  Service: Vascular    TONSILLECTOMY      TUBAL LIGATION      WISDOM TOOTH EXTRACTION       Social History   Social History     Substance and Sexual Activity   Alcohol Use Not Currently    Comment: n/s     Social History     Substance and Sexual Activity   Drug Use Not Currently    Comment: n/a     E-Cigarette/Vaping    E-Cigarette Use Never User      E-Cigarette/Vaping Substances    Nicotine No     THC No     CBD No     Flavoring No     Other No     Unknown No      Social History     Tobacco Use   Smoking Status Never Smoker   Smokeless Tobacco Never Used   Tobacco Comment    n/a     Family History:   Family History   Problem Relation Age of Onset    Cancer Mother         small cell cancer     Heart disease Father     COPD Father     Heart disease Brother     Nephrolithiasis Brother        Meds/Allergies   all current active meds have been reviewed, current meds:   Current Facility-Administered Medications   Medication Dose Route Frequency    acetaminophen (TYLENOL) tablet 650 mg  650 mg Oral Q6H PRN    acetaminophen (TYLENOL) tablet 975 mg  975 mg Oral Q8H [address]    aluminum-magnesium hydroxide-simethicone (MYLANTA) oral suspension 30 mL  30 mL Oral Q6H PRN    atorvastatin (LIPITOR) tablet 40 mg  40 mg Oral HS    calcitriol (ROCALTROL) capsule 0 25 mcg  0 25 mcg Oral Daily    cholecalciferol (VITAMIN D) oral liquid 800 Units  800 Units Oral Daily    citalopram (CeleXA) tablet 20 mg  20 mg Oral Daily    docusate sodium (COLACE) capsule 100 mg  100 mg Oral BID    fentanyl citrate (PF) 100 MCG/2ML 25 mcg  25 mcg Intravenous Q2H PRN    levothyroxine tablet 75 mcg  75 mcg Oral Daily    melatonin tablet 3 mg  3 mg Oral HS    metoprolol tartrate (LOPRESSOR) tablet 25 mg  25 mg Oral BID    metroNIDAZOLE (FLAGYL) tablet 500 mg  500 mg Oral Q8H [facility]    ondansetron (ZOFRAN) injection 4 mg  4 mg Intravenous Q6H PRN    saccharomyces boulardii (FLORASTOR) capsule 250 mg  250 mg Oral Daily    senna (SENOKOT) tablet 8 6 mg  1 tablet Oral Daily    sodium bicarbonate 75 mEq in sodium chloride 0 45 % 1,000 mL infusion  50 mL/hr Intravenous Continuous    sodium bicarbonate tablet 650 mg  650 mg Oral BID after meals    and PTA meds:   Prior to Admission Medications   Prescriptions Last Dose Informant Patient Reported? Taking?    Cholecalciferol (VITAMIN D3) 1000 UNITS CAPS 10/8/2021 at Unknown time Self Yes Yes   Sig: Take by mouth daily 1000 IU   acetaminophen (TYLENOL) 325 mg tablet 10/8/2021 at Unknown time Self No Yes   Si mg every 6 hours as needed for mild pain or headache   apixaban (Eliquis) 2 5 mg 10/8/2021 at Unknown time Self No Yes   Sig: Take 1 tablet (2 5 mg total) by mouth 2 (two) times a day   Patient taking differently: Take 2 5 mg by mouth daily    atorvastatin (LIPITOR) 40 mg tablet 10/8/2021 at Unknown time Self No Yes   Sig: Take 1 tablet (40 mg total) by mouth daily at bedtime   calcitriol (ROCALTROL) 0 25 mcg capsule 10/8/2021 at Unknown time Self No Yes   Sig: Take 1 capsule (0 25 mcg total) by mouth daily   citalopram (CeleXA) 20 mg tablet 10/8/2021 at Unknown time Self No Yes   Sig: Take 1 tablet (20 mg total) by mouth daily   levothyroxine 75 mcg tablet 10/8/2021 at Unknown time Self No Yes   Sig: Take 1 tablet (75 mcg total) by mouth daily   loperamide (IMODIUM) 2 mg capsule 10/8/2021 at Unknown time Self No Yes   Sig: Take 1 capsule (2 mg total) by mouth 4 (four) times a day as needed for diarrhea   melatonin 3 mg 10/8/2021 at Unknown time Self No Yes   Sig: Take 1 tablet (3 mg total) by mouth daily at bedtime   metoprolol tartrate (LOPRESSOR) 25 mg tablet 10/8/2021 at Unknown time Self No Yes   Sig: Take 1 tablet (25 mg total) by mouth 2 (two) times a day   saccharomyces boulardii (Florastor) 250 mg capsule 10/8/2021 at Unknown time Self No Yes   Sig: Take 1 capsule (250 mg total) by mouth daily   Patient taking differently: Take 250 mg by mouth daily as needed    sodium bicarbonate 650 mg tablet 10/8/2021 at Unknown time Self No Yes   Sig: Take 1 tablet (650 mg total) by mouth 2 (two) times daily after meals      Facility-Administered Medications: None     Allergies   Allergen Reactions    Chlorhexidine Rash     petichi like rash when using chlorhexidine swabs prior to IV  Objective   First Vitals:   Blood Pressure: 150/67 (10/09/21 1427)  Pulse: (!) 114 (10/09/21 1427)  Temperature: 97 8 °F (36 6 °C) (10/09/21 1427)  Temp Source: Oral (10/09/21 1427)  Respirations: 20 (10/09/21 1427)  Height: 5' (152 4 cm) (10/09/21 1941)  Weight - Scale: 86 5 kg (190 lb 11 2 oz) (10/09/21 1941)  SpO2: 94 % (10/09/21 1427)    Current Vitals:   Blood Pressure: 136/93 (10/13/21 0749)  Pulse: (!) 124 (10/13/21 0749)  Temperature: (!) 97 4 °F (36 3 °C) (10/13/21 0749)  Temp Source: Oral (10/11/21 1539)  Respirations: 18 (10/13/21 0749)  Height: 5' (152 4 cm) (10/09/21 1941)  Weight - Scale: 86 5 kg (190 lb 11 2 oz) (10/09/21 1941)  SpO2: 95 % (10/13/21 0749)      Intake/Output Summary (Last 24 hours) at 10/13/2021 1605  Last data filed at 10/13/2021 1355  Gross per 24 hour   Intake 0 ml   Output 520 ml   Net -520 ml       Invasive Devices     Peripherally Inserted Central Catheter Line            PICC Line 10/11/21 1 day          Line            Hemodialysis AV Fistula 09/30/21 Right Upper arm 13 days          Drain            Urostomy Ileal conduit RUQ 1834 days    Rectal Tube With balloon <1 day                Physical Exam   General: Pt is awake, alert, mildly disoriented, had to re-orient and repeat questions, lying down in bed in NAD     HEENT: normocephalic, no scleral icterus, moist mucous membranes   Neck: Supple, non-tender, ROM intact   CV: RRR, no murmurs, gallops, rubs  S1 and S2  Resp: Lung sounds clear to auscultation B/L, normal respiratory effort no  wheezes, rhonchi, rhales   Abd: Soft, obese with mild RLQ tenderness near ileal conduit, non-distended, non-tympanitic  Normoactive bowelsounds all 4 quadrants  No rebound or guarding  No CVA tenderness  :  Ileal conduit in right lower quadrant, draining clearly yellow urine   Ext: Warm with no cyanosis, trace 1+ edema, no deformities  ROM intact    Lab Results:   I have personally reviewed pertinent lab results  , CBC:   Lab Results   Component Value Date    WBC 7 21 10/13/2021    HGB 8 3 (L) 10/13/2021    HCT 26 3 (L) 10/13/2021    MCV 91 10/13/2021     10/13/2021    MCH 28 7 10/13/2021    MCHC 31 6 10/13/2021    RDW 17 5 (H) 10/13/2021    MPV 10 7 10/13/2021   , CMP:   Lab Results   Component Value Date    SODIUM 142 10/13/2021    K 3 6 10/13/2021     (H) 10/13/2021    CO2 18 (L) 10/13/2021    BUN 65 (H) 10/13/2021    CREATININE 4 15 (H) 10/13/2021    CALCIUM 7 9 (L) 10/13/2021    AST 90 (H) 10/13/2021    ALT 15 10/13/2021    ALKPHOS 244 (H) 10/13/2021    EGFR 10 10/13/2021   , Coagulation: No results found for: PT, INR, APTT, Urinalysis: No results found for: COLORU, CLARITYU, SPECGRAV, PHUR, LEUKOCYTESUR, NITRITE, PROTEINUA, GLUCOSEU, KETONESU, BILIRUBINUR, BLOODU, Amylase: No results found for: AMYLASE, Lipase: No results found for: LIPASE  Imaging: I have personally reviewed pertinent reports  EKG, Pathology, and Other Studies: I have personally reviewed pertinent reports  Counseling / Coordination of Care  Total floor / unit time spent today 35 minutes  Greater than 50% of total time was spent with the patient and / or family counseling and / or coordination of care  A description of the counseling / coordination of care      Patrick Olivarez PA-C

## 2021-10-13 NOTE — PHYSICAL THERAPY NOTE
PHYSICAL THERAPY NOTE          Patient Name: Carole GRAYSONZH'NADIR Date: 10/13/2021     10/13/21 0919   PT Last Visit   PT Visit Date 10/13/21   Note Type   Note Type Treatment   Pain Assessment   Pain Assessment Tool FLACC   Pain Location/Orientation Location: Abdomen;Orientation: Bilateral;Location: Arm   Patient's Stated Pain Goal No pain   Hospital Pain Intervention(s) Repositioned; Ambulation/increased activity; Emotional support   Restrictions/Precautions   Weight Bearing Precautions Per Order No   Other Precautions Contact/isolation;Cognitive; Chair Alarm; Bed Alarm;Multiple lines; Fall Risk;Pain   General   Chart Reviewed Yes   Response to Previous Treatment Patient with no complaints from previous session  Family/Caregiver Present No   Cognition   Arousal/Participation Responsive; Cooperative   Attention Attends with cues to redirect   Orientation Level Oriented X4   Memory Decreased recall of precautions   Following Commands Follows one step commands with increased time or repetition   Comments Pt continues to be limited by pain and fatigue  Pt with increased lethargy this date, increased time/ encouragement required to participate in mobiliy tasks  Bed Mobility   Rolling R 3  Moderate assistance   Additional items Assist x 1;Bedrails; Increased time required;Verbal cues   Rolling L 3  Moderate assistance   Additional items Assist x 1; Increased time required;Verbal cues   Supine to Sit Unable to assess   Sit to Supine Unable to assess   Additional Comments Pt supine in bed upon arrival  Pt repositioned in bed with wedges to offload buttocks and bed alarm on with all needs within reach at the end of therapy session      Endurance Deficit   Endurance Deficit Yes   Endurance Deficit Description pain, fatigue, deconditioning   Activity Tolerance   Activity Tolerance Patient limited by fatigue;Patient limited by pain   Medical Staff Made Aware OT; co-treatment performed this date 2* increased medical complexity, multiple co-morbidities and ? funcitonal status    Nurse Made Aware RN cleared pt to participate in therapy session    Exercises   Heelslides Supine;Bilateral;10 reps;AAROM   Glute Sets Supine;Bilateral;5 reps;AROM   Hip Flexion Supine;Bilateral;10 reps;AAROM   Knee AROM Short Arc Quad Supine;Bilateral;5 reps   Ankle Pumps Supine;Bilateral;10 reps;AROM   Heel Cord Stretch Supine;Bilateral;5 reps   Assessment   Prognosis Fair   Problem List Decreased strength;Decreased endurance; Impaired balance;Decreased mobility;Pain;Decreased skin integrity; Impaired judgement;Decreased safety awareness   Assessment Pt seen for PT treatment session this date  Therapy session focused on bed mobility, therapeutic exercise, pt education regarding mobilization/ repositioning in order to improve overall mobility and independence  Pt requires assist of 1 for all mobility performed this date  Pt with increased fatigue/ lethargy this date- increased time and encouragement required to perform mobility tasks  Pt performed multiple R/L rolling with mod Ax1 to complete  Pt refusing to participate in further EOB/ OOB mobility this date, despite education  Pt agreeable to performing b/l LE therex to hips, knees and ankles  Pt making slow progress toward goals 2* decreased activity tolerance  Pt was left supine in bed with wedges to offload buttocks and alarm on at the end of PT session with all needs in reach  Pt would benefit from continued PT services while in hospital to address remaining limitations  PT to continue to follow pt and recommends post-acute rehab services after d/c  The patient's AM-PAC Basic Mobility Inpatient Short Form Low Function Raw Score 13 , Standardized Score is 20  14  A standardized score less 42 9 suggests the patient may benefit from discharge to post-acute rehab services   Please also refer to the recommendation of the Physical Therapist for safe discharge planning  Barriers to Discharge Inaccessible home environment;Decreased caregiver support   Goals   Patient Goals to rest    STG Expiration Date 10/25/21   PT Treatment Day 1   Plan   Treatment/Interventions LE strengthening/ROM; Therapeutic exercise;Patient/family training;Bed mobility;Spoke to nursing;OT   Progress Slow progress, decreased activity tolerance   PT Frequency Other (Comment)  (3-5x/wk )   Recommendation   PT Discharge Recommendation Post acute rehabilitation services   PT - OK to Discharge Yes  (to rehab once medically cleared )   AM-PAC Basic Mobility Inpatient   Turning in Bed Without Bedrails 2   Lying on Back to Sitting on Edge of Flat Bed 1   Moving Bed to Chair 1   Standing Up From Chair 1   Walk in Room 1   Climb 3-5 Stairs 1   Basic Mobility Inpatient Raw Score 7   Turning Head Towards Sound 3   Follow Simple Instructions 3   Low Function Basic Mobility Raw Score 13   Low Function Basic Mobility Standardized Score 20 14     Demetri Almeida, PT, DPT

## 2021-10-13 NOTE — PLAN OF CARE
Problem: MOBILITY - ADULT  Goal: Maintain or return to baseline ADL function  Description: INTERVENTIONS:  -  Assess patient's ability to carry out ADLs; assess patient's baseline for ADL function and identify physical deficits which impact ability to perform ADLs (bathing, care of mouth/teeth, toileting, grooming, dressing, etc )  - Assess/evaluate cause of self-care deficits   - Assess range of motion  - Assess patient's mobility; develop plan if impaired  - Assess patient's need for assistive devices and provide as appropriate  - Encourage maximum independence but intervene and supervise when necessary  - Involve family in performance of ADLs  - Assess for home care needs following discharge   - Consider OT consult to assist with ADL evaluation and planning for discharge  - Provide patient education as appropriate  Outcome: Progressing  Goal: Maintains/Returns to pre admission functional level  Description: INTERVENTIONS:  - Perform BMAT or MOVE assessment daily    - Set and communicate daily mobility goal to care team and patient/family/caregiver     - Collaborate with rehabilitation services on mobility goals if consulted  - Out of bed for toileting  - Record patient progress and toleration of activity level   Outcome: Progressing     Problem: Potential for Falls  Goal: Patient will remain free of falls  Description: INTERVENTIONS:  - Educate patient/family on patient safety including physical limitations  - Instruct patient to call for assistance with activity   - Consult OT/PT to assist with strengthening/mobility   - Keep Call bell within reach  - Keep bed low and locked with side rails adjusted as appropriate  - Keep care items and personal belongings within reach  - Initiate and maintain comfort rounds  - Make Fall Risk Sign visible to staff     Problem: Prexisting or High Potential for Compromised Skin Integrity  Goal: Skin integrity is maintained or improved  Description: INTERVENTIONS:  - Identify patients at risk for skin breakdown  - Assess and monitor skin integrity  - Assess and monitor nutrition and hydration status  - Monitor labs   - Assess for incontinence   - Turn and reposition patient  - Assist with mobility/ambulation  - Relieve pressure over bony prominences  - Avoid friction and shearing  - Provide appropriate hygiene as needed including keeping skin clean and dry  - Evaluate need for skin moisturizer/barrier cream  - Collaborate with interdisciplinary team   - Patient/family teaching  - Consider wound care consult   Outcome: Progressing

## 2021-10-13 NOTE — PROGRESS NOTES
NEPHROLOGY HOSPITAL PROGRESS NOTE   Sharon Granda 77 y o  female MRN: [id number]  Unit/Bed#: [facility] 929-01 Encounter: [id number]  Reason for Consult: CKD V    ASSESSMENT and PLAN:    60-year-old female with past medical history of CKD, hypertension, hyperparathyroidism, anemia, pulmonary emboli, bilateral hydronephrosis secondary to obstructive uropathy and nonfunctional bladder status post supratrigonal cystectomy and ileal conduit in October 2016, status post nephrostomy tube placement in May 2021, status post left percutaneous retrograde percutaneous nephrostomy tube placement in September 2021 who initially presents with abdominal pain on October 9th   There is concern for hematoma   Also with elective right brachial basilic fistula superficialization with transposition on September 30th      1) CKD stage 5     - baseline creatinine 3-3 5 mg/dL  -outpatient nephrologist Dr Levy   -urinalysis 20-30 RBC, 20-30 WBC  - access-AV fistula with placed into January 2021 and status post fistulogram with angioplasty earlier this urine also status post transposition on September 30t  -10/11-creatinine stable   3 6 mg/dL   Potassium low being repleted   -10/12-creatinine stable 3 6  Bicarb lower  Urine output diminished  Given intravenous fluids  -10/13-creatinine rising 4 2 mg/dL  Will give fluids  Also check CT scan  Worsening renal function may be in the setting of hypotension yesterday but also will need to rule out any obstructive issues     Plan  -continue to hold diuretics    -give intravenous fluids today  -reviewed with primary team and Urology team-agree with imaging of abdomen and pelvis with CT scan without contrast  -okay to continue oral bicarbonate tablets for now  -if signs of pulmonary edema, give Lasix  -reviewed with patient worsening renal function today  -BMP and phosphorus in a m   - avoid hypotension  - avoid nephrotoxic agents     2) retroperitoneal hematoma-due to perinephric hematoma and now concern for sepsis     -urology on board-continued observation of left renal hematoma  -concern for possible sepsis due to initial fever and leukocytosis-ID on board-on empiric antibiotics  -antibiotics expanded to include cefepime and Flagyl per ID team due to bacteremia with fusiform bacterium  -urine culture with Klebsiella and Enterobacter     3) electrolytes     -hypokalemia-repleted and improved     4) acid/base     -on sodium bicarbonate tablets- worsening bicarbonate  Give bicarbonate fluids  -slightly elevated anion gap     5) anemia-     -transfusion per primary team  - f/u haptoglobin     6) history of cystectomy with ileal conduit in 2016 due to bladder dysfunction and 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 and 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 with left nephrostomy tube and nephroureteral catheter     -on October 4th, nephrostomy tube was removed  -most recent CT scan concerning for hemoperitoneum, perinephric hematoma     7) hemodialysis access-vascular surgery team on board due to arm swelling     -duplex--greater than 50% stenosis in the proximal subclavian vein, and basilic vein  -IR has been consulted for evaluation  Once the patient is more stable for fistulogram      8) MBD-on calcitriol and vitamin-D     9) volume-has whole body overloaded but is lying supine  Appears comfortable        10) pl effusion - per Primary team; small L effusion     -status post thoracentesis on 10/11 with 90 cc aspirated with inability to aspirate further  -follow-up thoracentesis studies per primary team     11) polycythemia vera-per primary team       SUBJECTIVE / 24H INTERVAL HISTORY:    Patient denies complaints  No shortness of breath  Lying supine  Had hypotension yesterday  Urine output oliguric      OBJECTIVE:  Current Weight: Weight - Scale: 86 5 kg (190 lb 11 2 oz)  Vitals:    10/12/21 1754 10/12/21 2242 10/12/21 2242 10/13/21 0749   BP: 100/63 102/64 102/64 136/93   BP Location:       Pulse: 88   (!) 124   Resp:  18 18 18   Temp:    (!) 97 4 °F (36 3 °C)   TempSrc:       SpO2: 95%   95%   Weight:       Height:           Intake/Output Summary (Last 24 hours) at 10/13/2021 1022  Last data filed at 10/13/2021 0908  Gross per 24 hour   Intake 240 ml   Output 320 ml   Net -80 ml     General: NAD  Skin: no rash  Eyes: anicteric sclera  ENT:  Dry mucous membrane  Neck: supple  Chest: CTA b/l, no ronchii, no wheeze, no rubs, no rales, diminished intake bases  CVS: s1s2, no murmur, no gallop, no rub  Abdomen: soft, nontender, nl sounds  Extremities:  Trace to 1+ edema LE b/l  :  Positive urostomy  Neuro: AAOX3  Psych: normal affect    Medications:    Current Facility-Administered Medications:     acetaminophen (TYLENOL) tablet 650 mg, 650 mg, Oral, Q6H PRN, Kole Marcelo MD, 650 mg at 10/11/21 1739    acetaminophen (TYLENOL) tablet 975 mg, 975 mg, Oral, Q8H [address], Kole Marcelo MD, 975 mg at 10/13/21 0509    aluminum-magnesium hydroxide-simethicone (MYLANTA) oral suspension 30 mL, 30 mL, Oral, Q6H PRN, Kole Marcelo MD    atorvastatin (LIPITOR) tablet 40 mg, 40 mg, Oral, HS, Kole Marcelo MD, 40 mg at 10/12/21 2126    calcitriol (ROCALTROL) capsule 0 25 mcg, 0 25 mcg, Oral, Daily, Kole Marcelo MD, 0 25 mcg at 10/13/21 0855    cholecalciferol (VITAMIN D) oral liquid 800 Units, 800 Units, Oral, Daily, Kole Marcelo MD, 800 Units at 10/13/21 0855    citalopram (CeleXA) tablet 20 mg, 20 mg, Oral, Daily, Kole Marcelo MD, 20 mg at 10/13/21 0855    docusate sodium (COLACE) capsule 100 mg, 100 mg, Oral, BID, Kole Marcelo MD, 100 mg at 10/12/21 1101    fentanyl citrate (PF) 100 MCG/2ML 25 mcg, 25 mcg, Intravenous, Q2H PRN, Kole Marcelo MD    levothyroxine tablet 75 mcg, 75 mcg, Oral, Daily, Kole Marcelo MD, 75 mcg at 10/13/21 0855    melatonin tablet 3 mg, 3 mg, Oral, HS, Kole Marcelo MD, 3 mg at 10/12/21 2126    metoprolol tartrate (LOPRESSOR) tablet 25 mg, 25 mg, Oral, BID, Amor Cook MD, 25 mg at 10/13/21 0855    metroNIDAZOLE (FLAGYL) tablet 500 mg, 500 mg, Oral, Q8H [address], Kayce Varghese MD, 500 mg at 10/13/21 0509    ondansetron (ZOFRAN) injection 4 mg, 4 mg, Intravenous, Q6H PRN, Amara Oliveira MD, 4 mg at 10/13/21 0901    saccharomyces boulardii (FLORASTOR) capsule 250 mg, 250 mg, Oral, Daily, Amara Oliveira MD, 250 mg at 10/13/21 0855    senna (SENOKOT) tablet 8 6 mg, 1 tablet, Oral, Daily, Amara Oliveira MD, 8 6 mg at 10/12/21 1101    sodium bicarbonate 150 mEq in dextrose 5 % 1,000 mL infusion, 50 mL/hr, Intravenous, Continuous, Katelynn Benjamin MD, Last Rate: 50 mL/hr at 10/13/21 1014, 50 mL/hr at 10/13/21 1014    sodium bicarbonate tablet 650 mg, 650 mg, Oral, BID after meals, Amara Oliveira MD, 650 mg at 10/13/21 0855    Laboratory Results:  Results from last 7 days   Lab Units 10/13/21  0517 10/12/21  0145 10/11/21  1652 10/11/21  1059 10/11/21  0337 10/11/21  0028 10/10/21  1647 10/10/21  0621 10/09/21  1447   WBC Thousand/uL 7 21 3 96*  --   --  4 85  --   --  4 97 6 15   HEMOGLOBIN g/dL 8 3* 9 3* 9 2* 8 5* 6 9* 6 7* 7 2* 7 1* 9 3*   HEMATOCRIT % 26 3* 29 7* 29 0* 27 1* 22 0* 21 5* 23 0* 23 2* 29 8*   PLATELETS Thousands/uL 243 244  --   --  212  --   --  187 276   POTASSIUM mmol/L 3 6 3 3*  --   --  3 0*  --   --  3 8 3 2*   CHLORIDE mmol/L 114* 115*  --   --  113*  --   --  109* 110*   CO2 mmol/L 18* 15*  --   --  20*  --   --  18* 16*   BUN mg/dL 65* 53*  --   --  51*  --   --  44* 37*   CREATININE mg/dL 4 15* 3 63*  --   --  3 55*  --   --  3 60* 3 26*   CALCIUM mg/dL 7 9* 8 0*  --   --  7 9*  --   --  8 5 9 3   MAGNESIUM mg/dL 2 4 2 3  --   --  1 7  --   --  1 7  --    PHOSPHORUS mg/dL 2 3 3 2  --   --  5 0*  --   --  4 9*  --

## 2021-10-13 NOTE — ASSESSMENT & PLAN NOTE
Sepsis POA  Poor source control with abscess/loculated collection seen on CT scan today  Discussed with surgery and IR  IR considering drainage today,   Surgery saw that patient and are in discussion with IR  Check lactic acid

## 2021-10-13 NOTE — PLAN OF CARE
Problem: MOBILITY - ADULT  Goal: Maintain or return to baseline ADL function  Description: INTERVENTIONS:  -  Assess patient's ability to carry out ADLs; assess patient's baseline for ADL function and identify physical deficits which impact ability to perform ADLs (bathing, care of mouth/teeth, toileting, grooming, dressing, etc )  - Assess/evaluate cause of self-care deficits   - Assess range of motion  - Assess patient's mobility; develop plan if impaired  - Assess patient's need for assistive devices and provide as appropriate  - Encourage maximum independence but intervene and supervise when necessary  - Involve family in performance of ADLs  - Assess for home care needs following discharge   - Consider OT consult to assist with ADL evaluation and planning for discharge  - Provide patient education as appropriate  Outcome: Progressing  Goal: Maintains/Returns to pre admission functional level  Description: INTERVENTIONS:  - Perform BMAT or MOVE assessment daily    - Set and communicate daily mobility goal to care team and patient/family/caregiver     - Collaborate with rehabilitation services on mobility goals if consulted  - Stand patient 3 times a day  - Ambulate patient 3 times a day  - Out of bed to chair 2 times a day   - Out of bed for meals 2 times a day  - Out of bed for toileting  - Record patient progress and toleration of activity level   Outcome: Progressing     Problem: Potential for Falls  Goal: Patient will remain free of falls  Description: INTERVENTIONS:  - Educate patient/family on patient safety including physical limitations  - Instruct patient to call for assistance with activity   - Consult OT/PT to assist with strengthening/mobility   - Keep Call bell within reach  - Keep bed low and locked with side rails adjusted as appropriate  - Keep care items and personal belongings within reach  - Initiate and maintain comfort rounds  - Make Fall Risk Sign visible to staff  - Offer Toileting every 4 Hours, in advance of need  - Initiate/Maintain bed alarm  - Obtain necessary fall risk management equipment  - Apply yellow socks and bracelet for high fall risk patients  - Consider moving patient to room near nurses station  Outcome: Progressing     Problem: Prexisting or High Potential for Compromised Skin Integrity  Goal: Skin integrity is maintained or improved  Description: INTERVENTIONS:  - Identify patients at risk for skin breakdown  - Assess and monitor skin integrity  - Assess and monitor nutrition and hydration status  - Monitor labs   - Assess for incontinence   - Turn and reposition patient  - Assist with mobility/ambulation  - Relieve pressure over bony prominences  - Avoid friction and shearing  - Provide appropriate hygiene as needed including keeping skin clean and dry  - Evaluate need for skin moisturizer/barrier cream  - Collaborate with interdisciplinary team   - Patient/family teaching  - Consider wound care consult   Outcome: Progressing

## 2021-10-13 NOTE — TELEMEDICINE
e-Consult (IPC)  - Interventional Radiology  Bernarda Moody 77 y o  female MRN: [id number]  Unit/Bed#: [facility] 929-01 Encounter: [id number]    IR has been consulted to evaluate the patient, determine the appropriate procedure, and whether or not a procedure can and should be performed regarding the care of Bernarda Moody  We were consulted by internal medicine concerning new left paracolic gutter 7 6D5 4A29 4PI, and to possibly perform a  if medically appropriate for the patient  IP Consult to IR  Consult performed by: Shyann Larsen PA-C  Consult ordered by: Joshua Garrett MD        10/13/21      Assessment/Recommendation:     77year old female well known to IR for left retrograde pcnu  Patient with known left perinephric collection  Patient had repeat CT abdomen/pelvis yesterday with new left paracolic gutter collection 6 8x3 6x12 8cm    - will plan for aspiration/drain placement of both left perinephric collection and left paracolic gutter collection  Will determine based on aspiration fluid   - please keep npo  Last patient ate was lunch  - stat INR  Last 10/11/21, 2 0  - right paracolic gutter collection is chronic and 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

## 2021-10-13 NOTE — UTILIZATION REVIEW
Continued Stay Review    Date: 10/13/2021                     Current Patient Class: inpatient  Current Level of Care: med/surg  HPI:  78 y/o female with PMHx of CKD, HTN, hyperparathyroidism, anemia, pulmonary emboli, b/l hydronephrosis 2/2 obstructive uropathy and nonfunctional bladder s/p supratrigonal cystectomy and ileal conduit in October 2016, s/p nephrostomy tube placement in May 2021, s/p left percutaneous retrograde percutaneous nephrostomy tube placement in September 2021 who was initially admitted 10/9 with abdominal pain and concern for hematoma   Also with elective right brachial basilic fistula superficialization with transposition on September 30th  Assessment/Plan: pt had hypertension yesterday  Oliguric  Worsening renal function may be in the setting of hypotension yesterday but also need to r/o any obstructive issues   Creatinine rising, 4 2 today  Nephrology following  IVFs started  Check CT  Continue supportive care  ID following, keep on flagyl currently  PT recommending IP rehab  CM following for d/c disposition       Vital Signs:   Date/Time  Temp  Pulse  Resp  BP  MAP (mmHg)  SpO2  O2 Device   10/13/21 07:49:16  97 4 °F (36 3 °C)Abnormal   124Abnormal   18  136/93  107  95 %  --   10/12/21 17:54:14  --  88  --  100/63  75  95 %  --   10/12/21 11:05:24  --  123Abnormal   --  118/63  81  93 %  --   10/12/21 1102  --  123Abnormal   --  118/63  --  --  --   10/12/21 0900  --  --  --  --  --  --  None (Room air)   10/12/21 03:11:27  --  99  --  97/57  70  94 %  --   10/12/21 02:38:57  --  121Abnormal   18  86/56Abnormal   66  94 %  --   10/12/21 0159  --  --  --  90/62  --  --  --   10/12/21 01:57:25  --  141Abnormal   19  88/58Abnormal   68  96 %  --   10/12/21 0056  98 5 °F (36 9 °C)  --  --  96/68  --  --  --   10/12/21 00:50:52  --  140Abnormal   20  93/66  75  95 %  --       Pertinent Labs/Diagnostic Results:     Results from last 7 days   Lab Units 10/13/21  0517 10/12/21  0145 10/11/21  1652 10/11/21  1059 10/11/21  0337 10/09/21  1447   WBC Thousand/uL 7 21 3 96*  --   --  4 85 6 15   HEMOGLOBIN g/dL 8 3* 9 3* 9 2* 8 5* 6 9* 9 3*   HEMATOCRIT % 26 3* 29 7* 29 0* 27 1* 22 0* 29 8*   PLATELETS Thousands/uL 243 244  --   --  212 276   BANDS PCT %  --   --   --   --  4 8     Results from last 7 days   Lab Units 10/13/21  0517 10/12/21  0145 10/11/21  0337 10/10/21  0621 10/09/21  1447   SODIUM mmol/L 142 141 141 138 138   POTASSIUM mmol/L 3 6 3 3* 3 0* 3 8 3 2*   CHLORIDE mmol/L 114* 115* 113* 109* 110*   CO2 mmol/L 18* 15* 20* 18* 16*   ANION GAP mmol/L 10 11 8 11 12   BUN mg/dL 65* 53* 51* 44* 37*   CREATININE mg/dL 4 15* 3 63* 3 55* 3 60* 3 26*   EGFR ml/min/1 73sq m 10 12 12 12 13   CALCIUM mg/dL 7 9* 8 0* 7 9* 8 5 9 3   MAGNESIUM mg/dL 2 4 2 3 1 7 1 7  --    PHOSPHORUS mg/dL 2 3 3 2 5 0* 4 9*  --      Results from last 7 days   Lab Units 10/13/21  0517 10/12/21  0145 10/10/21  0621 10/09/21  1447   AST U/L 90*  --  72* 63*   ALT U/L 15  --  15 11*   ALK PHOS U/L 244*  --  81 128*   TOTAL PROTEIN g/dL 5 8*  --  6 3* 6 4   ALBUMIN g/dL 2 0* 2 2* 3 0* 2 1*   TOTAL BILIRUBIN mg/dL 1 15*  --  1 60* 1 26*     Results from last 7 days   Lab Units 10/13/21  0517 10/12/21  0145 10/11/21  0337 10/10/21  0621 10/09/21  1447   GLUCOSE RANDOM mg/dL 158* 66 93 107 100     Results from last 7 days   Lab Units 10/11/21  0222   C DIFF TOXIN B BY PCR  Negative     Results from last 7 days   Lab Units 10/12/21  0156 10/11/21  1851 10/10/21  1256 10/09/21  1447 10/09/21  1444   BLOOD CULTURE  No Growth at 24 hrs    No Growth at 24 hrs   --   --  Fusobacterium mortiferum* Fusobacterium mortiferum*   GRAM STAIN RESULT   --  1+ Polys  No organisms seen  --  Gram negative rods* Gram negative rods*   URINE CULTURE   --   --  >100,000 cfu/ml Klebsiella pneumoniae*  >100,000 cfu/ml Enterobacter aerogenes*  20,000-29,000 cfu/ml Enterococcus faecalis*  --   --    BODY FLUID CULTURE, STERILE   --  No growth  -- --   --      Results from last 7 days   Lab Units 10/11/21  1851   TOTAL COUNTED  100   WBC FLUID /ul 10,056       Medications:   Scheduled Medications:  acetaminophen, 975 mg, Oral, Q8H LONG  atorvastatin, 40 mg, Oral, HS  calcitriol, 0 25 mcg, Oral, Daily  cholecalciferol, 800 Units, Oral, Daily  citalopram, 20 mg, Oral, Daily  docusate sodium, 100 mg, Oral, BID  levothyroxine, 75 mcg, Oral, Daily  melatonin, 3 mg, Oral, HS  metoprolol tartrate, 25 mg, Oral, BID  metroNIDAZOLE, 500 mg, Oral, Q8H [address]  saccharomyces boulardii, 250 mg, Oral, Daily  senna, 1 tablet, Oral, Daily  sodium bicarbonate, 650 mg, Oral, BID after meals    Continuous IV Infusions:  sodium bicarbonate infusion, 50 mL/hr, Intravenous, Continuous    PRN Meds:  acetaminophen, 650 mg, Oral, Q6H PRN  aluminum-magnesium hydroxide-simethicone, 30 mL, Oral, Q6H PRN  fentanyl citrate (PF), 25 mcg, Intravenous, Q2H PRN  ondansetron, 4 mg, Intravenous, Q6H PRN 10/13 x1        Discharge Plan: D    Network Utilization Review Department  ATTENTION: Please call with any questions or concerns to [phone] and carefully listen to the prompts so that you are directed to the right person  All voicemails are confidential   Alex Wolf all requests for admission clinical reviews, approved or denied determinations and any other requests to dedicated fax number below belonging to the campus where the patient is receiving treatment   List of dedicated fax numbers for the Facilities:  [address] DENIALS (Administrative/Medical Necessity) [phone]   [address] (Maternity/NICU/Pediatrics) [phone]   [address]   [address] [place] [address] [id number] [address] [facility] [phone]   [address] [facility] [address] [facility] [phone]

## 2021-10-13 NOTE — ASSESSMENT & PLAN NOTE
Mild to moderate pleural effusion on CXR  Discussed with IR who recommended placing thoracentesis order  They will evaluate for drainage    IR thoracentesis with -- drained  Review thoro labs  Consider consult to pulm

## 2021-10-13 NOTE — QUICK NOTE
Written in retrospect -   Discussed with son, he is main medical decision maker  Explained that patient is more ill  Deteriorating from more than one stand point  He is aware and in agreement that she is a level 1 full code

## 2021-10-13 NOTE — OCCUPATIONAL THERAPY NOTE
Occupational Therapy Progress Note     Patient Name: Wilfredo Sawant  [id number] Date: 10/13/2021  Problem List  Principal Problem:    Renal hematoma, left  Active Problems:    Chronic saddle pulmonary embolism (HCC)    Obstructive uropathy    Hypertension    Polycythemia vera ([address] )    Anemia due to stage 5 chronic kidney disease, not on chronic dialysis (HCC)    Sepsis (HCC)    Acute renal failure superimposed on stage 5 chronic kidney disease, not on chronic dialysis (HCC)    Pleural effusion    Localized swelling of right upper extremity          10/13/21 0922   OT Last Visit   OT Visit Date 10/13/21   Note Type   Note Type Treatment   Restrictions/Precautions   Weight Bearing Precautions Per Order No   Other Precautions Contact/isolation;Cognitive; Bed Alarm;Multiple lines; Fall Risk;Pain   General   Response to Previous Treatment Patient reporting fatigue but able to participate   Lifestyle   Autonomy PTA, pt reports being I with ADLs/IADLs, fnxl mobility, (+)    Reciprocal Relationships Neighbor   Service to Others Retired   Intrinsic Gratification Watching TV   Pain Assessment   Pain Assessment Tool FLACC   Pain Location/Orientation Orientation: Left; Location: Abdomen;Orientation: Lower  (+ B/L arms )   Hospital Pain Intervention(s) Repositioned; Ambulation/increased activity   Pain Rating: FLACC (Rest) - Face 1   Pain Rating: FLACC (Rest) - Legs 0   Pain Rating: FLACC (Rest) - Activity 0   Pain Rating: FLACC (Rest) - Cry 0   Pain Rating: FLACC (Rest) - Consolability 1   Score: FLACC (Rest) 2   Pain Rating: FLACC (Activity) - Face 1   Pain Rating: FLACC (Activity) - Legs 0   Pain Rating: FLACC (Activity) - Activity 0   Pain Rating: FLACC (Activity) - Cry 1   Pain Rating: FLACC (Activity) - Consolability 1   Score: FLACC (Activity) 3   ADL   Where Assessed Supine, bed   Grooming Assistance 3  Moderate Assistance   Grooming Deficit Brushing hair;Wash/dry face   Grooming Comments + washing hair with shampoo cap  Increased assist required 2* fatigue    Toileting Assistance  1  Total Assistance   Toileting Comments Pt currently has catheter + rectal tube  Requires A for hygiene around these areas    Bed Mobility   Rolling R 3  Moderate assistance   Additional items Assist x 1;Bedrails; Increased time required   Rolling L 3  Moderate assistance   Additional items Assist x 1;Bedrails; Increased time required   Additional Comments Pt refusing/unable to tolerate any further movement or transfers at this time    Transfers   Sit to Stand Unable to assess   Stand to Sit Unable to assess   Therapeutic Exercise - ROM   UE-ROM Yes   ROM - Left Upper Extremities    L Shoulder AAROM; Flexion; Extension   L Position Supine   L Weight/Reps/Sets x10   Cognition   Arousal/Participation Responsive; Cooperative   Attention Attends with cues to redirect   Orientation Level Oriented to person;Oriented to place;Oriented to situation   Memory Decreased recall of precautions   Following Commands Follows one step commands with increased time or repetition   Comments Pt continues to be limited by fatigue/lethargy  Encouragement to participate in tasks    Activity Tolerance   Activity Tolerance Patient limited by fatigue;Patient limited by pain   Medical Staff Made Aware PT Shekhar Ray RN [place]   Assessment   Assessment Patient participated in Skilled OT session this date with interventions consisting of ADL re training with the use of correct body mechnaics and  therapeutic activities to: increase activity tolerance   Upon arrival patient was found supine in bed  Pt demonstrated the following tasks: MOD A to roll, MOD A grooming, and DEP for toileting hygiene  Pt very fatigued/increased lethargy during session  Requires encouragement to participate in tasks  Pt refusing/unable to tolerate OOB/EOB tasks at this time  Pt did participate in x10 LUE sh flex   Patient continues to be functioning below baseline level, occupational performance remains limited secondary to factors listed above and increased risk for falls and injury  From OT standpoint, recommendation at time of d/c would be STR  Patient to benefit from continued Occupational Therapy treatment while in the hospital to address deficits as defined above and maximize level of functional independence with ADLs and functional mobility  Pt was left in bed with alarm on after session with all current needs met  The patient's raw score on the AM-PAC Daily Activity inpatient short form is 13, standardized score is 32 03, less than 39 4  Patients at this level are likely to benefit from discharge to post-acute rehabilitation services  Please refer to the recommendation of the Occupational Therapist for safe discharge planning  Plan   Treatment Interventions ADL retraining;Functional transfer training;UE strengthening/ROM; Endurance training;Cognitive reorientation;Patient/family training;Equipment evaluation/education; Fine motor coordination activities; Compensatory technique education;Continued evaluation; Energy conservation; Activityengagement   Goal Expiration Date 10/25/21   OT Treatment Day 1   OT Frequency 2-3x/wk   Recommendation   OT Discharge Recommendation Post acute rehabilitation services   OT - OK to Discharge Yes  (to rehab when medically stable )   AM-PAC Daily Activity Inpatient   Lower Body Dressing 2   Bathing 2   Toileting 2   Upper Body Dressing 2   Grooming 2   Eating 3   Daily Activity Raw Score 13   Daily Activity Standardized Score (Calc for Raw Score >=11) 32 03   AM-PAC Applied Cognition Inpatient   Following a Speech/Presentation 3   Understanding Ordinary Conversation 4   Taking Medications 3   Remembering Where Things Are Placed or Put Away 3   Remembering List of 4-5 Errands 3   Taking Care of Complicated Tasks 3   Applied Cognition Raw Score 19   Applied Cognition Standardized Score 39 77        Lesa Javier MS, OTR/L

## 2021-10-13 NOTE — PLAN OF CARE
Problem: OCCUPATIONAL THERAPY ADULT  Goal: Performs self-care activities at highest level of function for planned discharge setting  See evaluation for individualized goals  Description: Treatment Interventions: ADL retraining, Functional transfer training, UE strengthening/ROM, Endurance training, Patient/family training, Equipment evaluation/education, Compensatory technique education, Continued evaluation, Activityengagement, Energy conservation          See flowsheet documentation for full assessment, interventions and recommendations  Note: Limitation: Decreased ADL status, Decreased UE ROM, Decreased UE strength, Decreased endurance, Decreased self-care trans, Decreased high-level ADLs  Prognosis: Fair  Assessment: Patient participated in Skilled OT session this date with interventions consisting of ADL re training with the use of correct body mechnaics and  therapeutic activities to: increase activity tolerance   Upon arrival patient was found supine in bed  Pt demonstrated the following tasks: MOD A to roll, MOD A grooming, and DEP for toileting hygiene  Pt very fatigued/increased lethargy during session  Requires encouragement to participate in tasks  Pt refusing/unable to tolerate OOB/EOB tasks at this time  Pt did participate in x10 LUE sh flex  Patient continues to be functioning below baseline level, occupational performance remains limited secondary to factors listed above and increased risk for falls and injury  From OT standpoint, recommendation at time of d/c would be STR  Patient to benefit from continued Occupational Therapy treatment while in the hospital to address deficits as defined above and maximize level of functional independence with ADLs and functional mobility  Pt was left in bed with alarm on after session with all current needs met  The patient's raw score on the AM-PAC Daily Activity inpatient short form is 13, standardized score is 32 03, less than 39 4   Patients at this level are likely to benefit from discharge to post-acute rehabilitation services  Please refer to the recommendation of the Occupational Therapist for safe discharge planning       OT Discharge Recommendation: Post acute rehabilitation services  OT - OK to Discharge: Yes (to rehab when medically stable )

## 2021-10-13 NOTE — ASSESSMENT & PLAN NOTE
Pt presented with right upper extremity swelling  Vascular surgery consulted on admission  IR also consulted for fistulagram, plan to do fistulagram once patient is more stable  US doppler showed: There is a >50% stenosis noted in the proximal subclavian vein  There is a >50%  stenosis noted the proximal basilic vein, which also torturous  The dialysis AVF appears to be matured with adequate diameter, flow volumes and  less than 6mm in depth throughout the arm  Vascular and IR following

## 2021-10-13 NOTE — PLAN OF CARE
Problem: PHYSICAL THERAPY ADULT  Goal: Performs mobility at highest level of function for planned discharge setting  See evaluation for individualized goals  Description: Treatment/Interventions: Patient/family training, LE strengthening/ROM, Bed mobility, Spoke to nursing, Spoke to case management, OT  Equipment Recommended:  (TBD )       See flowsheet documentation for full assessment, interventions and recommendations  Outcome: Progressing  Note: Prognosis: Fair  Problem List: Decreased strength, Decreased endurance, Impaired balance, Decreased mobility, Pain, Decreased skin integrity, Impaired judgement, Decreased safety awareness  Assessment: Pt seen for PT treatment session this date  Therapy session focused on bed mobility, therapeutic exercise, pt education regarding mobilization/ repositioning in order to improve overall mobility and independence  Pt requires assist of 1 for all mobility performed this date  Pt with increased fatigue/ lethargy this date- increased time and encouragement required to perform mobility tasks  Pt performed multiple R/L rolling with mod Ax1 to complete  Pt refusing to participate in further EOB/ OOB mobility this date, despite education  Pt agreeable to performing b/l LE therex to hips, knees and ankles  Pt making slow progress toward goals 2* decreased activity tolerance  Pt was left supine in bed with wedges to offload buttocks and alarm on at the end of PT session with all needs in reach  Pt would benefit from continued PT services while in hospital to address remaining limitations  PT to continue to follow pt and recommends post-acute rehab services after d/c  The patient's AM-PAC Basic Mobility Inpatient Short Form Low Function Raw Score 13 , Standardized Score is 20  14  A standardized score less 42 9 suggests the patient may benefit from discharge to post-acute rehab services   Please also refer to the recommendation of the Physical Therapist for safe discharge planning  Barriers to Discharge: Inaccessible home environment, Decreased caregiver support        PT Discharge Recommendation: Post acute rehabilitation services     PT - OK to Discharge: Yes (to rehab once medically cleared )    See flowsheet documentation for full assessment

## 2021-10-13 NOTE — ASSESSMENT & PLAN NOTE
Lab Results   Component Value Date    EGFR 10 10/13/2021    EGFR 12 10/12/2021    EGFR 12 10/11/2021    CREATININE 4 15 (H) 10/13/2021    CREATININE 3 63 (H) 10/12/2021    CREATININE 3 55 (H) 10/11/2021   worsening RF     Discussed with urology and nephrology   CT scan performed  Now in view on findings - likely secondary to sepsis

## 2021-10-13 NOTE — ASSESSMENT & PLAN NOTE
Continue with metoprolol 25 mg bid  Hold for low BP  Last night hypotensive with molina continue with hold parameters

## 2021-10-13 NOTE — ASSESSMENT & PLAN NOTE
Hx of Polycythemia vera and MDS  Significant anemia secondary to blood loss in the past   The patient is currently off of the [facility] treatment for her PV and getting mainly Aranesp on every 28 day basis  Hematology consulted for evaluation, recommend once patient is better/ stable, discharge, she will follow-up with Dr Jennifer Rose,  her primary hematologist

## 2021-10-13 NOTE — PROGRESS NOTES
Progress Note - Infectious Disease   Shyann Camacho 77 y o  female MRN: [id number]  Unit/Bed#: [facility] 929-01 Encounter: [id number]      Impression/Plan:  1  Sepsis, POA   Patient presented with progressing flank/abdominal discomfort likely partially related to problem 3  Hemoglobin had declined  Vinicius Choudhary is without leukocytosis   On initial evaluation she was noted to have fever along with tachycardia which have improved   Blood cultures and urine cultures have now returned positive  Will keep on Flagyl alone for now  Continue to trend fever curve/vitals  Repeat labs tomorrow  Follow-up pending blood cultures  Follow-up pending thoracentesis cultures  Recommend IR evaluation for drainage as below  Monitor abdominal/flank exam  Urology/nephrology follow-up ongoing  Oncology evaluation noted  Additional supportive care as per primary     2  Fusobacterium bacteremia   Two of 2 blood cultures have returned positive  Atypical pathogen to be isolated and cause urinary tract infection  CT imaging of the abdomen on admission unremarkable  Urine ultimately did not isolate this organism  Repeat imaging performed today with new collections noted in the abdomen as below and partial SBO, likely source  Patient without any teeth  Chest imaging unremarkable and thoracentesis cultures so far without growth  Repeat blood cultures so far without growth    Continue antibiotics as above  Continue to trend fever curve/vitals  Recommend IR evaluation for drainage and culture  General surgery evaluation noted  Follow-up repeat blood cultures  Additional interventions pending clinical course  Duration of antibiotic therapy pending clinical course     3  Left renal hematoma with chronic obstructive uropathy   Patient has a chronic obstructive uropathy involving the left side and recently had PCN removal   Subsequently developed bleeding at the site and hematoma on imaging likely due to chronic anticoagulation   Imaging reviewed   Vitals improved  Likely unrelated to 2  Appears stable/improving on repeat images  Antibiotics as above  Follow-up pending cultures  Ongoing follow-up by IR  Urology evaluation noted  Monitor hemoglobin closely  Transfusional support     3  Chronic kidney disease with fistula  Patient with baseline chronic kidney disease with fistula created in the right upper extremity   She has significant swelling which she reports has been present since after procedure  Possible cause of the above  No dose adjustment for Flagyl  Repeat labs tomorrow  Ongoing follow-up by Nephrology  Vascular surgery follow-up/imaging on going  Avoid PICC line if possible     4  History of C diff and ongoing diarrhea   Patient had a history of C diff in 2019  She has had repeat PCRs in July 2021 which were negative   She remains on antibiotic as above   Reports diarrhea and repeat testing negative again   Suspect antibiotic associated diarrhea  Continue systemic antibiotic  Monitor stool output  No C diff prophylaxis     5  Polycythemia vera and MDS   Ongoing management/supportive care as per primary   Oncology follow-up ongoing      6  Prior PE on anticoagulation   Likely risk factor for issues as above  AC as per primary        7  Transaminitis  Patient noted to have slowly increasing alkaline phosphatase and AST  In the setting of this bacteremia would question if she is developing an occult liver abscess  Would also question intraluminal GI source given ongoing diarrhea as above  Repeat LFTs tomorrow  Continue Flagyl for now  Consider GI consult if LFTs remain elevated  Would consider liver ultrasound  IR evaluation recommended as above    Above plan discussed briefly with the patient and with primary service attending earlier today      ID consult service will continue to follow  Antibiotics:  Flagyl 3  Total antibiotic 5    Subjective:  Patient seen at bedside earlier today  She reported still feeling very fatigued    Denied having any nausea, vomiting, chest pain  Tolerating current antibiotic  She has no teeth  Denied having any neck discomfort, sore throat or pain with manipulation of her neck  Still reporting some mild vague abdominal discomfort  Objective:  Vitals:  Temp:  [97 4 °F (36 3 °C)-97 5 °F (36 4 °C)] 97 5 °F (36 4 °C)  HR:  [] 124  Resp:  [18] 18  BP: (100-136)/(51-93) 100/51  SpO2:  [95 %] 95 %  Temp (24hrs), Av 5 °F (36 4 °C), Min:97 4 °F (36 3 °C), Max:97 5 °F (36 4 °C)  Current: Temperature: 97 5 °F (36 4 °C)    Physical Exam:   General Appearance:  Very sleepy on exam and requires significant encouragement to participate  , chronically ill-appearing and frail  Throat: Oropharynx moist without lesions  Patient without any teeth  Lungs:   Clear to auscultation bilaterally; no wheezes, rhonchi or rales; respirations unlabored on room air   Heart:  RRR; systolic murmur noted, no rubs or gallops   Abdomen:   Soft, nondistended, mild tenderness when palpating close to the left lower quadrant  Minimal bowel sounds  Extremities: No clubbing, cyanosis; mild nonpitting edema in the lower legs bilaterally  Patient has ongoing significant edema in the upper extremity right greater than left  Skin: No new rashes or lesions on exposed skin  No new draining wounds noted on exposed skin  Patient has continued significant ecchymoses noted on the left upper extremity         Labs, Imaging, & Other studies:   All pertinent labs and imaging studies were personally reviewed  Results from last 7 days   Lab Units 10/13/21  0517 10/12/21  0145 10/11/21  1652 10/11/21  0337   WBC Thousand/uL 7 21 3 96*  --  4 85   HEMOGLOBIN g/dL 8 3* 9 3* 9 2* 6 9*   PLATELETS Thousands/uL 243 244  --  212     Results from last 7 days   Lab Units 10/13/21  0517 10/10/21  0621 10/09/21  1447   POTASSIUM mmol/L 3 6 3 8 3 2*   CHLORIDE mmol/L 114* 109* 110*   CO2 mmol/L 18* 18* 16*   BUN mg/dL 65* 44* 37*   CREATININE mg/dL 4 15* 3 60* 3 26*   EGFR ml/min/1 73sq m 10 12 13   CALCIUM mg/dL 7 9* 8 5 9 3   AST U/L 90* 72* 63*   ALT U/L 15 15 11*   ALK PHOS U/L 244* 81 128*     Results from last 7 days   Lab Units 10/12/21  0156 10/11/21  1851 10/11/21  0222 10/10/21  1256 10/09/21  1447 10/09/21  1444   BLOOD CULTURE  No Growth at 24 hrs    No Growth at 24 hrs   --   --   --  Fusobacterium mortiferum* Fusobacterium mortiferum*   GRAM STAIN RESULT   --  1+ Polys  No organisms seen  --   --  Gram negative rods* Gram negative rods*   URINE CULTURE   --   --   --  >100,000 cfu/ml Klebsiella pneumoniae*  >100,000 cfu/ml Enterobacter aerogenes*  20,000-29,000 cfu/ml Enterococcus faecalis*  <10,000 cfu/ml Enterococcus avium*  --   --    BODY FLUID CULTURE, STERILE   --  No growth  --   --   --   --    C DIFF TOXIN B BY PCR   --   --  Negative  --   --   --

## 2021-10-13 NOTE — PROGRESS NOTES
Progress Note - Urology  Sharon Granda 77 y o  female MRN: [id number]  Unit/Bed#: [facility] 929-01 Encounter: [id number]    Assessment & Plan:    Perinephric hematoma:  -developed s/p PCN removal last week  -hemoglobin continues to be stable between 8 and 9   -continue to trend hemoglobin, transfuse as needed  -LUANN, confirming is to increase from 3 63 and 4 15 which prompted repeat imaging  Nephrology following   -continue with pain management   -bed rest   -repeat CT scan  revealed a retrograde left nephroureteral stent is seen and is noted extending from the ileal conduit into the left renal pelvis  The hydronephrosis which was noted on the previous study on May 8, 2021 has resolved  Perinephric/subscapular collection is noted along the left kidne measuring 5 7 x 3 7 cm  Previously measuring 6 6 x 6 2 cm  Small focus of air within this subscapular collection remains stable  Thickening of the left anterior perirenal fascia 0 within nodularity seems stable  Right kidney demonstrates nonobstructing calculi in the lower pole measuring 1 cm    -no surgical intervention required at this time  Continue with medical optimization per primary team   Urology will continue to follow  Subjective/Objective   Chief Complaint:  Pain    Subjective:   Patient currently difficult to arouse  She is lying in bed currently having blood drawn by her nurse  She is mildly responsive  When asked if she has pain she does not initially respond  Upon arousal with internal medicine  She reports that she is having pain  Objective:     Blood pressure 136/93, pulse (!) 124, temperature (!) 97 4 °F (36 3 °C), resp  rate 18, height 5' (1 524 m), weight 86 5 kg (190 lb 11 2 oz), SpO2 95 %, not currently breastfeeding  ,Body mass index is 37 24 kg/m²        Intake/Output Summary (Last 24 hours) at 10/13/2021 1536  Last data filed at 10/13/2021 1355  Gross per 24 hour   Intake 0 ml   Output 520 ml   Net -520 ml       Invasive Devices     Peripherally Inserted Central Catheter Line            PICC Line 10/11/21 1 day          Line            Hemodialysis AV Fistula 09/30/21 Right Upper arm 13 days          Drain            Urostomy Ileal conduit RUQ 1834 days    Rectal Tube With balloon <1 day                Physical Exam  Constitutional:       General: She is not in acute distress  Appearance: She is obese  She is ill-appearing  She is not toxic-appearing or diaphoretic  HENT:      Head: Normocephalic and atraumatic  Right Ear: External ear normal       Left Ear: External ear normal       Nose: Nose normal    Eyes:      General: No scleral icterus  Conjunctiva/sclera: Conjunctivae normal    Cardiovascular:      Rate and Rhythm: Normal rate  Pulses: Normal pulses  Heart sounds: No murmur heard  No friction rub  No gallop  Pulmonary:      Effort: Pulmonary effort is normal  No respiratory distress  Breath sounds: No wheezing, rhonchi or rales  Abdominal:      General: Bowel sounds are normal  There is no distension  Palpations: Abdomen is soft  Tenderness: There is no abdominal tenderness  Genitourinary:     Comments: Ileal conduit in place draining clear yellow urine  Musculoskeletal:         General: Normal range of motion  Cervical back: Normal range of motion  Skin:     General: Skin is warm and dry  Neurological:      Mental Status: She is alert  She is disoriented  Lab, Imaging and other studies:I have personally reviewed pertinent lab results      Lab Results   Component Value Date    WBC 7 21 10/13/2021    HGB 8 3 (L) 10/13/2021    HCT 26 3 (L) 10/13/2021    MCV 91 10/13/2021     10/13/2021     Lab Results   Component Value Date    SODIUM 142 10/13/2021    K 3 6 10/13/2021     (H) 10/13/2021    CO2 18 (L) 10/13/2021    BUN 65 (H) 10/13/2021    CREATININE 4 15 (H) 10/13/2021    GLUC 158 (H) 10/13/2021    CALCIUM 7 9 (L) 10/13/2021       VTE Pharmacologic Prophylaxis: Reason for no pharmacologic prophylaxis Retroperitoneal bleed  VTE Mechanical Prophylaxis: sequential compression device      Yamini Reveles PA-C

## 2021-10-14 ENCOUNTER — HOSPITAL ENCOUNTER (OUTPATIENT)
Dept: INFUSION CENTER | Facility: HOSPITAL | Age: 76
End: 2021-10-14
Attending: INTERNAL MEDICINE

## 2021-10-14 LAB
ALBUMIN SERPL BCP-MCNC: 1.8 G/DL (ref 3.5–5)
ALP SERPL-CCNC: 185 U/L (ref 46–116)
ALT SERPL W P-5'-P-CCNC: 11 U/L (ref 12–78)
ANION GAP SERPL CALCULATED.3IONS-SCNC: 9 MMOL/L (ref 4–13)
AST SERPL W P-5'-P-CCNC: 80 U/L (ref 5–45)
BACTERIA SPEC BFLD CULT: NO GROWTH
BILIRUB SERPL-MCNC: 1.21 MG/DL (ref 0.2–1)
BUN SERPL-MCNC: 74 MG/DL (ref 5–25)
CALCIUM ALBUM COR SERPL-MCNC: 9.7 MG/DL (ref 8.3–10.1)
CALCIUM SERPL-MCNC: 7.9 MG/DL (ref 8.3–10.1)
CHLORIDE SERPL-SCNC: 114 MMOL/L (ref 100–108)
CO2 SERPL-SCNC: 19 MMOL/L (ref 21–32)
CREAT SERPL-MCNC: 4.56 MG/DL (ref 0.6–1.3)
GFR SERPL CREATININE-BSD FRML MDRD: 9 ML/MIN/1.73SQ M
GLUCOSE SERPL-MCNC: 89 MG/DL (ref 65–140)
GRAM STN SPEC: NORMAL
GRAM STN SPEC: NORMAL
MAGNESIUM SERPL-MCNC: 2.1 MG/DL (ref 1.6–2.6)
PHOSPHATE SERPL-MCNC: 2.7 MG/DL (ref 2.3–4.1)
POTASSIUM SERPL-SCNC: 3.8 MMOL/L (ref 3.5–5.3)
PROT SERPL-MCNC: 5.3 G/DL (ref 6.4–8.2)
SODIUM SERPL-SCNC: 142 MMOL/L (ref 136–145)

## 2021-10-14 PROCEDURE — 80053 COMPREHEN METABOLIC PANEL: CPT | Performed by: INTERNAL MEDICINE

## 2021-10-14 PROCEDURE — 99232 SBSQ HOSP IP/OBS MODERATE 35: CPT | Performed by: INTERNAL MEDICINE

## 2021-10-14 PROCEDURE — 84100 ASSAY OF PHOSPHORUS: CPT | Performed by: INTERNAL MEDICINE

## 2021-10-14 PROCEDURE — 99233 SBSQ HOSP IP/OBS HIGH 50: CPT | Performed by: INTERNAL MEDICINE

## 2021-10-14 PROCEDURE — 99232 SBSQ HOSP IP/OBS MODERATE 35: CPT | Performed by: SURGERY

## 2021-10-14 PROCEDURE — 83735 ASSAY OF MAGNESIUM: CPT | Performed by: INTERNAL MEDICINE

## 2021-10-14 PROCEDURE — 99232 SBSQ HOSP IP/OBS MODERATE 35: CPT | Performed by: PHYSICIAN ASSISTANT

## 2021-10-14 RX ORDER — SODIUM CHLORIDE 9 MG/ML
10 INJECTION INTRAVENOUS DAILY
Qty: 600 ML | Refills: 3 | Status: SHIPPED | OUTPATIENT
Start: 2021-10-14 | End: 2021-11-24 | Stop reason: ALTCHOICE

## 2021-10-14 RX ORDER — SODIUM CHLORIDE, SODIUM GLUCONATE, SODIUM ACETATE, POTASSIUM CHLORIDE, MAGNESIUM CHLORIDE, SODIUM PHOSPHATE, DIBASIC, AND POTASSIUM PHOSPHATE .53; .5; .37; .037; .03; .012; .00082 G/100ML; G/100ML; G/100ML; G/100ML; G/100ML; G/100ML; G/100ML
75 INJECTION, SOLUTION INTRAVENOUS CONTINUOUS
Status: DISCONTINUED | OUTPATIENT
Start: 2021-10-14 | End: 2021-10-15

## 2021-10-14 RX ADMIN — SODIUM CHLORIDE, SODIUM GLUCONATE, SODIUM ACETATE, POTASSIUM CHLORIDE, MAGNESIUM CHLORIDE, SODIUM PHOSPHATE, DIBASIC, AND POTASSIUM PHOSPHATE 75 ML/HR: .53; .5; .37; .037; .03; .012; .00082 INJECTION, SOLUTION INTRAVENOUS at 10:53

## 2021-10-14 RX ADMIN — ACETAMINOPHEN 975 MG: 325 TABLET, FILM COATED ORAL at 14:34

## 2021-10-14 RX ADMIN — CITALOPRAM HYDROBROMIDE 20 MG: 20 TABLET ORAL at 08:30

## 2021-10-14 RX ADMIN — METRONIDAZOLE 500 MG: 500 TABLET ORAL at 14:34

## 2021-10-14 RX ADMIN — ACETAMINOPHEN 975 MG: 325 TABLET, FILM COATED ORAL at 05:17

## 2021-10-14 RX ADMIN — Medication 250 MG: at 08:30

## 2021-10-14 RX ADMIN — ONDANSETRON 4 MG: 2 INJECTION INTRAMUSCULAR; INTRAVENOUS at 21:53

## 2021-10-14 RX ADMIN — CALCITRIOL 0.25 MCG: 0.25 CAPSULE, LIQUID FILLED ORAL at 08:31

## 2021-10-14 RX ADMIN — ACETAMINOPHEN 975 MG: 325 TABLET, FILM COATED ORAL at 21:51

## 2021-10-14 RX ADMIN — SODIUM BICARBONATE 650 MG TABLET 650 MG: at 08:30

## 2021-10-14 RX ADMIN — METOPROLOL TARTRATE 25 MG: 25 TABLET, FILM COATED ORAL at 08:30

## 2021-10-14 RX ADMIN — MELATONIN TAB 3 MG 3 MG: 3 TAB at 21:51

## 2021-10-14 RX ADMIN — PIPERACILLIN AND TAZOBACTAM 2.25 G: 36; 4.5 INJECTION, POWDER, FOR SOLUTION INTRAVENOUS at 21:53

## 2021-10-14 RX ADMIN — METRONIDAZOLE 500 MG: 500 TABLET ORAL at 05:16

## 2021-10-14 RX ADMIN — LEVOTHYROXINE SODIUM 75 MCG: 75 TABLET ORAL at 08:30

## 2021-10-14 RX ADMIN — PIPERACILLIN AND TAZOBACTAM 2.25 G: 36; 4.5 INJECTION, POWDER, FOR SOLUTION INTRAVENOUS at 17:01

## 2021-10-14 RX ADMIN — ATORVASTATIN CALCIUM 40 MG: 40 TABLET, FILM COATED ORAL at 21:51

## 2021-10-14 NOTE — PROGRESS NOTES
Progress Note - Infectious Disease   Carole Callahan 77 y o  female MRN: [id number]  Unit/Bed#: [facility] 929-01 Encounter: [id number]      Impression/Plan:  1  SIRS syndrome, sepsis:  Patient presented with worsening pain and was found to be febrile and tachycardic on arrival   Elevated procalcitonin with no leukocytosis  Given concern for secondary infection from problem 2, patient was empirically started on ceftriaxone  Blood cultures growing fusobacterium mortiferum (see problem 2)  Urine ultures also positive for Klebsiella and Enterobacter  Status post IR drainage and culture of abdomen collection on 10/13  Repeat blood cultures negative to date  Hemodynamically stable  WBC up trending and has been increasingly more altered in the last 24 hours  Perinephric fluid collection with gram positive rods and cocci  AMS likely multifactorial in setting or worsening kidney function and current infection with poor source control (problem 2)  Continue Flagyl 500 mg q8 hours    Will discuss restarting cefepime 1000 mg q12 hours    Monitor WBC and trend fever curve   Follow-up repeat blood cultures   Follow-up thoracentesis cultures   Follow-up body fluid cultures (left flank and left paranephric)   Continue to monitor abdominal exam with low threshold for re-imaging   Additional care per primary medicine team    2  Fusobacterium bacteremia:  Blood cultures positive 2/2  Rare cause of bacteremia and atypical isolate  Most typically due to urinary track or oral infection  Urine cultures with no growth of this organism  Patient with no teeth and no signs of oral abscess  Repeat blood cultures obtained with negative growth to date  CT A/P on 10/14 identifying a new fluid collection in the left paracolic gutter measuring 6 8 x 3 6 x 12 6 cm along with previously identified loculated fluid collection  Possible SBO as well  This is likely the source of infection  S/P IR drainage and culture collection x2 on 10/13  Removal of 35 cc from each site of odorous fluid  Placement of drain into left perinephric and left paracolic collection)   Antibiotics as above   Monitor WBC and trend fever curve   Follow up body fluid cultures   Follow-up repeat blood cultures   General surgery consulted; no plan for intervention   Duration of antibiotics pending clinical course    3  Left renal hematoma with chronic obstructive uropahty:  Patient presenting with worsening flank and abdominal pain  CT abdomen/pelvis demonstrating hematoma in the left renal fossa and extension of hemorrhage/thickening suggesting possible hemo peritoneum  Patient recently underwent left PCN and is likely cause of bleed, especially in the setting of chronic anticoagulation  Antibiotics as above   Urology nephrology consulted and appreciate   Supportive care and transfusions per primary team    4  Positive urine cultures: Urine cultures positive for >100,000 cfu/ml klebsiella and enterobacter along with >20,000 cfu/ml enterococcus  Positive in setting of recent urological intervention, worsening kidney function, and problem #3  Last set of urine cultures performed 7/2021 negative  Previously on cefepime but discontinued on 10/12 - noted to have worsening mental status in last 24 hours and clinically worsening  However, no leukocytosis and afebrile with additional sources of infection  At this time, will continue on current course with low threshold to broaden antibx  Antibiotics as above    Monitor WBC and trend fever curve    If continues to clinically deteriorate - broaden antibiotics      5  Small left pleural effusion: Noted to have small left pleural effusion  S/P OR thoracentesis on 10/11 with removal of 90 cc of nikos fluid  Follow up thoracentesis studies   Clinically monitor    6  Transaminitis:  Patient noted to have mild transaminitis - mild improvement today  Occurring in the setting of continued diarrhea    No liver biliary tree involvement noted on most recent CT  If liver enzymes continue to remain elevated in patient becomes increasingly altered, with question possibility of occult liver abscess  Antibiotics as above   Recommend liver ultrasound   Repeat labs tomorrow    7  Chronic kidney disease with fistula:  History of CKD stage 5, nearing hemodialysis  Fistula present in right upper extremity with evidence of significant swelling  Worsening creatinine noted  No need to renally adjust current antibiotics   Repeat labs tomorrow   Nephrology consulted and appreciate input   Avoid upper extremity PICC line    8  History of C  Difficule colitis in setting of continued diarrhea:  History of C difficile colitis back in 2019  Negative PCR in July 2021  Does continue to have recurrent episodes of loose stool  C diff stool studies completed and negative  Monitor stool output   No indication for prophylactic antibiotics at this time   Monitor WBC and trend fever curve    9  History of PE:  History of chronic saddle pulmonary embolism  Chronically on Eliquis 2 5 mg twice  daily at home  Anticoagulation per primary team    10  Polycythemia vera and MDS:  Follows with Hematology/Oncology as an outpatient and was on Jakafi  Worsening anemia noted - likely superimposed acute blood loss anemia from left perinephric hematoma and CKD  Transfusions and workup per primary medicine team   Hematology/oncology consulted and appreciate input   Closely monitor hemodynamics and clinically monitor   Anticoagulation per primary team    Thank you for involving us in the care of Ms Jair Ivey  Plan as noted above to be discussed with Infectious Disease attending  Please review final attestation  Antibiotics:  Total Antibiotics Days: 6  Current: Flagyl 500 mg q8 hours; Day 4    Subjective:  Seen and examined at bedside this morning  Lying in bed and in no acute distress or visible discomfort  Saturating appropriately on RA   Increasing more altered in last 24 hours with intermittent confusion  Not fully cooperating in ROS this morning  Does not currently endorse pain, fever/chills, nausea/vomitting, or lightheadedness/dizziness  Continues to have loose BM and stating that she needs to go to the bathroom now  Food left at bedside untouched, patient admits to no appetite  Objective:  Vitals:  Temp:  [97 4 °F (36 3 °C)-98 9 °F (37 2 °C)] 98 2 °F (36 8 °C)  HR:  [67-95] 95  Resp:  [16-20] 17  BP: ()/(51-61) 111/60  SpO2:  [90 %-97 %] 97 %  Temp (24hrs), Av 9 °F (36 6 °C), Min:97 4 °F (36 3 °C), Max:98 9 °F (37 2 °C)  Current: Temperature: 98 2 °F (36 8 °C)    Physical Exam:   General Appearance:  Lethargic  Intermittently answering questions and participating in conversation  No acute distress but ill-appearing; frail  Throat: Oropharynx moist without lesions  No teeth with no signs of oral abscesses  Lungs:   Poor respiratory effort  Clear to auscultation bilaterally; no wheezes, rhonchi or rales; respirations unlabored   Heart:  RRR; no murmur, rub or gallop   Abdomen:   Soft, non-tender, non-distended, positive bowel sounds  Mild tenderness to palpation in RUQ  Left flank drain present, draining brown/bloody fluid  Ureterostomy tube present in the LLQ  Extremities: No clubbing, cyanosis  Swelling in the RUQ  RUE fistula present with palpable thrill  Skin: No new rashes or lesions  No draining wounds noted  Skin tear noted on anterior chest - bandaged, clean/dry/intact  Significant ecchymosis on the left upper extremity          Labs, Imaging, & Other studies:   All pertinent labs and imaging studies were personally reviewed  Results from last 7 days   Lab Units 10/13/21  0517 10/12/21  0145 10/11/21  1652 10/11/21  0337   WBC Thousand/uL 7 21 3 96*  --  4 85   HEMOGLOBIN g/dL 8 3* 9 3* 9 2* 6 9*   PLATELETS Thousands/uL 243 244  --  212     Results from last 7 days   Lab Units 10/14/21  0435 10/13/21  0517 10/12/21  0145 10/10/21  0621   SODIUM mmol/L 142 142 141 138   POTASSIUM mmol/L 3 8 3 6 3 3* 3 8   CHLORIDE mmol/L 114* 114* 115* 109*   CO2 mmol/L 19* 18* 15* 18*   BUN mg/dL 74* 65* 53* 44*   CREATININE mg/dL 4 56* 4 15* 3 63* 3 60*   EGFR ml/min/1 73sq m 9 10 12 12   CALCIUM mg/dL 7 9* 7 9* 8 0* 8 5   AST U/L 80* 90*  --  72*   ALT U/L 11* 15  --  15   ALK PHOS U/L 185* 244*  --  81     Results from last 7 days   Lab Units 10/12/21  0156 10/11/21  1851 10/11/21  0222 10/10/21  1256 10/09/21  1447 10/09/21  1444   BLOOD CULTURE  No Growth at 48 hrs    No Growth at 48 hrs   --   --   --  Fusobacterium mortiferum* Fusobacterium mortiferum*   GRAM STAIN RESULT   --  1+ Polys  No organisms seen  --   --  Gram negative rods* Gram negative rods*   URINE CULTURE   --   --   --  >100,000 cfu/ml Klebsiella pneumoniae*  >100,000 cfu/ml Enterobacter aerogenes*  20,000-29,000 cfu/ml Enterococcus faecalis*  <10,000 cfu/ml Enterococcus avium*  --   --    BODY FLUID CULTURE, STERILE   --  No growth  --   --   --   --    C DIFF TOXIN B BY PCR   --   --  Negative  --   --   --      Results from last 7 days   Lab Units 10/10/21  0621 10/09/21  1447   PROCALCITONIN ng/ml 78 78* 21 02*         Results from last 7 days   Lab Units 10/10/21  0621   FERRITIN ng/mL 1,650*  1,668*

## 2021-10-14 NOTE — ASSESSMENT & PLAN NOTE
Sepsis POA  Poor source control with abscess/loculated collection seen on CT scan today  S/p drain by IR>   C/w abx as per ID

## 2021-10-14 NOTE — PROGRESS NOTES
Progress Note -Interventional Radiology CYNTHIA Hollowayldo Amen 77 y o  female MRN: [id number]  Unit/Bed#: [facility] 929-01 Encounter: [id number]    Assessment:      77year old female with pmh of left retrograde pcnu, recent replacement after tube falling out 9/20/21 with new access (PCN), s/p left PCN removal with subsequent left perinephric hematoma  Patient with bacteremia and lethargy  CT abdomen 10/13/21 with new left lateral abdominal fluid collection, s/p IR drain placement left perinephric and left lateral fluid collection    IR drain #1: 30cc serosanguinous  IR drain #2: 70cc serosanguinou    Plan:    - f/u cultures  - tubes need to be flushed twice daily with 10cc NS  - flushes for tubes to be sent to [facility]  Patient will most likely need VNA for assistance with tube flushes  - will schedule for routine 2 week tube check once discharged from hospital  - regarding fistulagram, patient may have this done when she is more stable or as an outpatient  She is not currently receiving dialysis       Patient Active Problem List   Diagnosis    Chronic saddle pulmonary embolism (HCC)    Bladder mass    Small bowel obstruction (HCC)    Obstructive uropathy    Secondary hyperparathyroidism of renal origin ([address] )    Hypertension    Polycythemia vera ([address]    Intraventricular hemorrhage (HCC)    Anemia due to stage 5 chronic kidney disease, not on chronic dialysis ([address] )    Mass of urethra    Sepsis ([address] )    Acute renal failure superimposed on stage 5 chronic kidney disease, not on chronic dialysis ([address] )    Thoracic compression fracture (HCC)    Benign neoplasm of supratentorial region of brain ([address] )    Meningioma ([address] )    Inverted T wave    Polycythemia    History of Clostridioides difficile colitis    History of shingles    Depression    Class 2 severe obesity due to excess calories with serious comorbidity and body mass index (BMI) of 35 0 to 35 9 in adult [facility])    Chronic thrombosis of subclavian vein (HCC)    Hyperlipemia    Gross hematuria    Hydronephrosis of left kidney    Anemia    Constipation    Obstructive left pyelonephritis    Right upper quadrant cyst    Obesity, morbid (HCC)    Febrile illness    COVID-19    Edema of right upper extremity    Stage 5 chronic kidney disease not on chronic dialysis ([facility] )    Ambulatory dysfunction    Iron deficiency anemia due to chronic blood loss    Poor venous access    Renal hematoma, left    Pleural effusion    Localized swelling of right upper extremity          Subjective: Patient lethargic but able to answer questions appropriately  Good drainage from drains  No abdominal pain  Objective:    Vitals:  /60   Pulse 95   Temp 98 2 °F (36 8 °C)   Resp 17   Ht 5' (1 524 m)   Wt 86 5 kg (190 lb 11 2 oz)   SpO2 97%   BMI 37 24 kg/m²   Body mass index is 37 24 kg/m²    Weight (last 2 days)     None          I/Os:    Intake/Output Summary (Last 24 hours) at 10/14/2021 1315  Last data filed at 10/14/2021 1240  Gross per 24 hour   Intake 293 75 ml   Output 365 ml   Net -71 25 ml       Invasive Devices     Peripherally Inserted Central Catheter Line            PICC Line 10/11/21 2 days          Line            Hemodialysis AV Fistula 09/30/21 Right Upper arm 14 days          Drain            Urostomy Ileal conduit RUQ 1835 days    Closed/Suction Drain Left Other (Comment) <1 day    Closed/Suction Drain Left Other (Comment) 10 Fr  <1 day                Physical Exam:  General appearance: lethargic  Lungs: clear to auscultation bilaterally  Heart: regular rate and rhythm, S1, S2 normal, no murmur, click, rub or gallop  Abdomen: soft, non-tender; bowel sounds normal; no masses,  no organomegaly  Skin: drain insertion site x2 c/d/i, drain outputs serosanguinous                Lab Results and Cultures:   CBC with diff:   Lab Results   Component Value Date    WBC 7 21 10/13/2021    HGB 8 3 (L) 10/13/2021    HCT 26 3 (L) 10/13/2021 MCV 91 10/13/2021     10/13/2021    MCH 28 7 10/13/2021    MCHC 31 6 10/13/2021    RDW 17 5 (H) 10/13/2021    MPV 10 7 10/13/2021    NRBC 0 10/10/2021      BMP/CMP:  Lab Results   Component Value Date     12/17/2015    K 3 8 10/14/2021    K 3 7 12/17/2015     (H) 10/14/2021     12/17/2015    CO2 19 (L) 10/14/2021    CO2 28 10/04/2016    ANIONGAP 9 12/17/2015    BUN 74 (H) 10/14/2021    BUN 8 12/17/2015    CREATININE 4 56 (H) 10/14/2021    CREATININE 0 95 12/17/2015    GLUCOSE 138 10/04/2016    GLUCOSE 96 12/17/2015    CALCIUM 7 9 (L) 10/14/2021    CALCIUM 8 9 12/17/2015    AST 80 (H) 10/14/2021     (H) 11/06/2015    ALT 11 (L) 10/14/2021    ALT 25 11/06/2015    ALKPHOS 185 (H) 10/14/2021    ALKPHOS 215 (H) 11/06/2015    PROT 6 2 (L) 11/06/2015    BILITOT 0 49 11/06/2015    EGFR 9 10/14/2021   ,     Coags:   Lab Results   Component Value Date    PTT 45 (H) 10/13/2021    PTT 50 (H) 10/31/2015    INR 1 60 (H) 10/13/2021    INR 1 42 (H) 11/03/2015   ,   Results from last 7 days   Lab Units 10/13/21  1712 10/11/21  0337 10/09/21  1447   PTT seconds 45* 46* 35   INR  1 60* 2 00* 1 63*        Lipid Panel:   Lab Results   Component Value Date    CHOL 175 05/20/2015     Lab Results   Component Value Date    HDL 70 (H) 04/18/2019     Lab Results   Component Value Date    HDL 70 (H) 04/18/2019     Lab Results   Component Value Date    LDLCALC 125 (H) 04/18/2019     Lab Results   Component Value Date    TRIG 110 04/18/2019       HgbA1c:   Lab Results   Component Value Date    HGBA1C 5 5 09/02/2020    HGBA1C 5 1 05/20/2015       Blood Culture:   Lab Results   Component Value Date    BLOODCX No Growth at 48 hrs  10/12/2021    BLOODCX No Growth at 48 hrs   10/12/2021   ,   Urinalysis:   Lab Results   Component Value Date    COLORU Dk Yellow 10/10/2021    COLORU Yellow 09/09/2015    CLARITYU Turbid 10/10/2021    CLARITYU Cloudy 09/09/2015    SPECGRAV 1 018 10/10/2021    SPECGRAV <=1 005 09/09/2015 PHUR 8 5 (A) 10/10/2021    PHUR 8 5 (H) 07/15/2021    PHUR 6 0 09/09/2015    LEUKOCYTESUR Moderate (A) 10/10/2021    LEUKOCYTESUR Large (A) 09/09/2015    NITRITE Negative 10/10/2021    NITRITE Positive (A) 09/09/2015    PROTEINUA 30 (1+) (A) 09/09/2015    GLUCOSEU Negative 10/10/2021    GLUCOSEU Negative 09/09/2015    KETONESU Negative 10/10/2021    KETONESU Negative 09/09/2015    BILIRUBINUR Negative 10/10/2021    BILIRUBINUR Negative 09/09/2015    BLOODU Large (A) 10/10/2021    BLOODU Moderate (A) 09/09/2015   ,   Urine Culture:   Lab Results   Component Value Date    URINECX >100,000 cfu/ml Klebsiella pneumoniae (A) 10/10/2021    URINECX >100,000 cfu/ml Enterobacter aerogenes (A) 10/10/2021    URINECX 20,000-29,000 cfu/ml Enterococcus faecalis (A) 10/10/2021    URINECX <10,000 cfu/ml Enterococcus avium (A) 10/10/2021   ,   Wound Culure:  No results found for: WOUNDCULT      Thank you for allowing me to participate in the care of Abilio Faria  Please don't hesitate to call, text, email, or TigerText with any questions  This text is generated with voice recognition software  There may be translation, syntax,  or grammatical errors  If you have any questions, please contact the dictating provider      Naga Morel PA-C

## 2021-10-14 NOTE — PLAN OF CARE
Problem: Potential for Falls  Goal: Patient will remain free of falls  Description: INTERVENTIONS:  - Educate patient/family on patient safety including physical limitations  - Instruct patient to call for assistance with activity   - Consult OT/PT to assist with strengthening/mobility   - Keep Call bell within reach  - Keep bed low and locked with side rails adjusted as appropriate  - Keep care items and personal belongings within reach  - Initiate and maintain comfort rounds  - Make Fall Risk Sign visible to staff    Outcome: Progressing

## 2021-10-14 NOTE — NURSING NOTE
Per pt family, pt is in "severe pain" and needs pain medicine  Upon assessment, pt is resting comfortably in bed  When asked if she has pain, pt recites the Lord's prayer  Pt received scheduled Tylenol at 1434  Dr Juanito Cerda made aware  Fentanyl contraindicated at this time d/t pt's lethargy and confusion

## 2021-10-14 NOTE — ASSESSMENT & PLAN NOTE
Anemia 2/2 CKD with component of acute blood loss anemia  Transfused  Yesterday  8 3 in the setting of IVF  Will continue to follow

## 2021-10-14 NOTE — PLAN OF CARE
Problem: Prexisting or High Potential for Compromised Skin Integrity  Goal: Skin integrity is maintained or improved  Description: INTERVENTIONS:  - Identify patients at risk for skin breakdown  - Assess and monitor skin integrity  - Assess and monitor nutrition and hydration status  - Monitor labs   - Assess for incontinence   - Turn and reposition patient  - Assist with mobility/ambulation  - Relieve pressure over bony prominences  - Avoid friction and shearing  - Provide appropriate hygiene as needed including keeping skin clean and dry  - Evaluate need for skin moisturizer/barrier cream  - Collaborate with interdisciplinary team   - Patient/family teaching  - Consider wound care consult   Outcome: Progressing     Problem: Potential for Falls  Goal: Patient will remain free of falls  Description: INTERVENTIONS:  - Educate patient/family on patient safety including physical limitations  - Instruct patient to call for assistance with activity   - Consult OT/PT to assist with strengthening/mobility   - Keep Call bell within reach  - Keep bed low and locked with side rails adjusted as appropriate  - Keep care items and personal belongings within reach  - Initiate and maintain comfort rounds  - Make Fall Risk Sign visible to staff  - Apply yellow socks and bracelet for high fall risk patients  - Consider moving patient to room near nurses station  Outcome: Progressing

## 2021-10-14 NOTE — PROGRESS NOTES
Progress Note - General Surgery   Jann Garcia 77 y o  female MRN: [id number]  Unit/Bed#: [facility] 929-01 Encounter: [id number]    Assessment:  Patient is a 77 y o  female multiple comorbidities who presented septic with left renal hematoma, now status post IR drain placement x2 on 10/13  General surgery consulted to rule out small-bowel obstruction  Afebrile,tachy to 120s otherwise VSS  UOP:325 One stool occurrence  IR drain: 90 total     Patient is a bit confused on my exam this morning  She is alert and minimally rest on the to questions  She keeps stating that her back hurts and asking for someone to help her, but when inquiring what she needs help with she does not say  Her abdomen is soft distended and difficult to assess for tenderness  IR drain in place with sanguinous output    Plan:  Diet as tolerated  Maintain IR drains x2  Follow-up IR cultures and sensitivities  Rest of care per primary team    Subjective/Objective     Subjective:   Events as noted above  Objective:    Blood pressure 102/58, pulse 87, temperature 98 2 °F (36 8 °C), temperature source Oral, resp  rate 17, height 5' (1 524 m), weight 86 5 kg (190 lb 11 2 oz), SpO2 95 %, not currently breastfeeding  ,Body mass index is 37 24 kg/m²  Intake/Output Summary (Last 24 hours) at 10/14/2021 0744  Last data filed at 10/14/2021 0050  Gross per 24 hour   Intake 20 ml   Output 435 ml   Net -415 ml       Invasive Devices     Peripherally Inserted Central Catheter Line            PICC Line 10/11/21 2 days          Line            Hemodialysis AV Fistula 09/30/21 Right Upper arm 14 days          Drain            Urostomy Ileal conduit RUQ 1835 days    Closed/Suction Drain Left Other (Comment) <1 day    Closed/Suction Drain Left Other (Comment) 10 Fr  <1 day                Physical Exam  Vitals reviewed  Constitutional:       General: She is not in acute distress  Appearance: She is not toxic-appearing or diaphoretic     HENT: Head: Normocephalic and atraumatic  Nose: Nose normal       Mouth/Throat:      Mouth: Mucous membranes are moist    Eyes:      Extraocular Movements: Extraocular movements intact  Cardiovascular:      Rate and Rhythm: Normal rate  Pulmonary:      Effort: Pulmonary effort is normal  No respiratory distress  Abdominal:      General: There is distension  Palpations: Abdomen is soft  Comments: IR drains in place x2   Musculoskeletal:         General: Swelling present  Skin:     General: Skin is warm and dry  Neurological:      Mental Status: She is alert  She is disoriented               Results from last 7 days   Lab Units 10/13/21  0517 10/12/21  0145 10/11/21  1652 10/11/21  0337   WBC Thousand/uL 7 21 3 96*  --  4 85   HEMOGLOBIN g/dL 8 3* 9 3* 9 2* 6 9*   HEMATOCRIT % 26 3* 29 7* 29 0* 22 0*   PLATELETS Thousands/uL 243 244  --  212     Results from last 7 days   Lab Units 10/14/21  0435 10/13/21  0517 10/12/21  0145   POTASSIUM mmol/L 3 8 3 6 3 3*   CHLORIDE mmol/L 114* 114* 115*   CO2 mmol/L 19* 18* 15*   BUN mg/dL 74* 65* 53*   CREATININE mg/dL 4 56* 4 15* 3 63*   CALCIUM mg/dL 7 9* 7 9* 8 0*     Results from last 7 days   Lab Units 10/13/21  1712 10/11/21  0337 10/09/21  1447   INR  1 60* 2 00* 1 63*   PTT seconds 45* 46* 35

## 2021-10-14 NOTE — ASSESSMENT & PLAN NOTE
Obstructive nephropathy  Chronic bilateral hydronephrosis stents were recently removed  Urology are following

## 2021-10-14 NOTE — ASSESSMENT & PLAN NOTE
Hx of Polycythemia vera and MDS  Significant anemia secondary to blood loss in the past   The patient is currently off of the [facility] treatment for her PV and getting mainly Aranesp on every 28 day basis  Hematology consulted for evaluation, recommend once patient is better/ stable, discharge, she will follow-up with Dr Lance Stanley,  her primary hematologist

## 2021-10-14 NOTE — ASSESSMENT & PLAN NOTE
Lab Results   Component Value Date    EGFR 9 10/14/2021    EGFR 10 10/13/2021    EGFR 12 10/12/2021    CREATININE 4 56 (H) 10/14/2021    CREATININE 4 15 (H) 10/13/2021    CREATININE 3 63 (H) 10/12/2021   worsening RF  In the setting of sepsis  Discussed with son regarding poor prognosis     Discussed with nephrology regarding overall poor state of health

## 2021-10-14 NOTE — NURSING NOTE
Patient continues to be lethargic and oriented x2-3  Patient becomes more oriented during conversation  Patient vomited once during this shift  CYNTHIA Rubalcava from [facility] made aware

## 2021-10-14 NOTE — PROGRESS NOTES
[address] [facility]  Progress Note - Tretha Cogan 3/97/2013, 77 y o  female MRN: [id number]  Unit/Bed#: [facility] 929-01 Encounter: [id number]  Primary Care Provider: Sherly Marino MD   Date and time admitted to hospital: 10/9/2021  2:19 PM    * Sepsis [facility])  Assessment & Plan  Sepsis POA  Poor source control with abscess/loculated collection seen on CT scan today  S/p drain by IR>   C/w abx as per ID  Localized swelling of right upper extremity  Assessment & Plan  Pt presented with right upper extremity swelling  Vascular surgery consulted on admission  IR also consulted for fistulagram, plan to do fistulagram once patient is more stable  US doppler showed: There is a >50% stenosis noted in the proximal subclavian vein  There is a >50%  stenosis noted the proximal basilic vein, which also torturous  The dialysis AVF appears to be matured with adequate diameter, flow volumes and  less than 6mm in depth throughout the arm  Vascular and IR following  Pleural effusion  Assessment & Plan  Mild to moderate pleural effusion on CXR      Renal hematoma, left  Assessment & Plan    Left renal hematoma:   Presented with left renal hematoma  Pigtail catheter is noted medial to kidney  Renal pelvis may be collapsed  Hemorrhage and thickening extending in the combined interfascial place of retrospective as well as some high density fluid within  Urology on board  No plan for intervention per urology at this time  Observation and treat the infection  Hemoglobin is 9 3  Kidneys are stable on CT scan          Acute renal failure superimposed on stage 5 chronic kidney disease, not on chronic dialysis [facility])  Assessment & Plan  Lab Results   Component Value Date    EGFR 9 10/14/2021    EGFR 10 10/13/2021    EGFR 12 10/12/2021    CREATININE 4 56 (H) 10/14/2021    CREATININE 4 15 (H) 10/13/2021    CREATININE 3 63 (H) 10/12/2021   worsening RF  In the setting of sepsis  Discussed with son regarding poor prognosis  Discussed with nephrology regarding overall poor state of health     Anemia due to stage 5 chronic kidney disease, not on chronic dialysis [facility])  Assessment & Plan    Anemia 2/2 CKD with component of acute blood loss anemia  Transfused  Yesterday  8 3 in the setting of IVF  Will continue to follow  Polycythemia vera ([address] )  Assessment & Plan  Hx of Polycythemia vera and MDS  Significant anemia secondary to blood loss in the past   The patient is currently off of the [facility] treatment for her PV and getting mainly Aranesp on every 28 day basis  Hematology consulted for evaluation, recommend once patient is better/ stable, discharge, she will follow-up with Dr Mallory Escalona,  her primary hematologist     Hypertension  Assessment & Plan  Continue with metoprolol 25 mg bid  Hold for low BP  Last night hypotensive with molina continue with hold parameters  Obstructive uropathy  Assessment & Plan  Obstructive nephropathy  Chronic bilateral hydronephrosis stents were recently removed  Urology are following       Chronic saddle pulmonary embolism (HCC)  Assessment & Plan  Chronic saddle pulmonary embolus  Eliquis 2 5 mg bid at home, currently on hold for now if IR wants to do intervention  Will discuss with specialists regarding restarting eliquis  VTE Pharmacologic Prophylaxis: VTE Score: 13 Moderate Risk (Score 3-4) - Pharmacological DVT Prophylaxis Ordered: heparin  Patient Centered Rounds: I performed bedside rounds with nursing staff today  Discussions with Specialists or Other Care Team Provider: Discussed with nephrology  Education and Discussions with Family / Patient: Updated  (son) via phone  Time Spent for Care: 30 minutes  More than 50% of total time spent on counseling and coordination of care as described above      Current Length of Stay: 5 day(s)  Current Patient Status: Inpatient   Certification Statement: The patient will continue to require additional inpatient hospital stay due to worsening RF  Discharge Plan: Anticipate discharge in >72 hrs to ill appearing unclear if she will improve  Code Status: Level 1 - Full Code    Subjective:   Patient seen and examined  Feeling "tired"    Objective:     Vitals:   Temp (24hrs), Av 8 °F (36 6 °C), Min:96 6 °F (35 9 °C), Max:98 9 °F (37 2 °C)    Temp:  [96 6 °F (35 9 °C)-98 9 °F (37 2 °C)] 96 6 °F (35 9 °C)  HR:  [67-95] 82  Resp:  [16-20] 18  BP: ()/(51-61) 97/55  SpO2:  [90 %-97 %] 92 %  Body mass index is 37 24 kg/m²  Input and Output Summary (last 24 hours): Intake/Output Summary (Last 24 hours) at 10/14/2021 1634  Last data filed at 10/14/2021 1300  Gross per 24 hour   Intake 413 75 ml   Output 515 ml   Net -101 25 ml       Physical Exam:   Physical Exam     Additional Data:     Labs:  Results from last 7 days   Lab Units 10/13/21  0517 10/11/21  1059 10/11/21  0337   WBC Thousand/uL 7 21   < > 4 85   HEMOGLOBIN g/dL 8 3*  --  6 9*   HEMATOCRIT % 26 3*  --  22 0*   PLATELETS Thousands/uL 243   < > 212   BANDS PCT %  --   --  4   LYMPHO PCT %  --   --  6*   MONO PCT %  --   --  3*   EOS PCT %  --   --  0    < > = values in this interval not displayed       Results from last 7 days   Lab Units 10/14/21  0435   SODIUM mmol/L 142   POTASSIUM mmol/L 3 8   CHLORIDE mmol/L 114*   CO2 mmol/L 19*   BUN mg/dL 74*   CREATININE mg/dL 4 56*   ANION GAP mmol/L 9   CALCIUM mg/dL 7 9*   ALBUMIN g/dL 1 8*   TOTAL BILIRUBIN mg/dL 1 21*   ALK PHOS U/L 185*   ALT U/L 11*   AST U/L 80*   GLUCOSE RANDOM mg/dL 89     Results from last 7 days   Lab Units 10/13/21  1712   INR  1 60*             Results from last 7 days   Lab Units 10/13/21  1539 10/10/21  0621 10/09/21  1447   LACTIC ACID mmol/L 1 8  --  1 7   PROCALCITONIN ng/ml  --  78 78* 21 02*       Lines/Drains:  Invasive Devices     Peripherally Inserted Central Catheter Line            PICC Line 10/11/21 2 days          Line Hemodialysis AV Fistula 09/30/21 Right Upper arm 14 days          Drain            Urostomy Ileal conduit RUQ 1835 days    Closed/Suction Drain Left Other (Comment) <1 day    Closed/Suction Drain Left Other (Comment) 10 Fr  <1 day                Central Line:  Goal for removal: Will discontinue when hemodynamically stable  Imaging: Reviewed radiology reports from this admission including: chest xray    Recent Cultures (last 7 days):   Results from last 7 days   Lab Units 10/13/21  1928 10/13/21  1923 10/12/21  0156 10/11/21  1851 10/11/21  0222 10/10/21  1256 10/09/21  1447 10/09/21  1444   BLOOD CULTURE   --   --  No Growth at 48 hrs    No Growth at 48 hrs   --   --   --  Fusobacterium mortiferum* Fusobacterium mortiferum*   GRAM STAIN RESULT  1+ Polys*  2+ Gram positive cocci in pairs*  2+ Gram positive rods*  1+ Gram negative rods* No Polys or Bacteria seen  --  1+ Polys  No organisms seen  --   --  Gram negative rods* Gram negative rods*   URINE CULTURE   --   --   --   --   --  >100,000 cfu/ml Klebsiella pneumoniae*  >100,000 cfu/ml Enterobacter aerogenes*  20,000-29,000 cfu/ml Enterococcus faecalis*  <10,000 cfu/ml Enterococcus avium*  --   --    BODY FLUID CULTURE, STERILE   --   --   --  No growth  --   --   --   --    C DIFF TOXIN B BY PCR   --   --   --   --  Negative  --   --   --        Last 24 Hours Medication List:   Current Facility-Administered Medications   Medication Dose Route Frequency Provider Last Rate    acetaminophen  650 mg Oral Q6H PRN Kvng Yin MD      acetaminophen  975 mg Oral Q8H [address] Kvng Yin MD      aluminum-magnesium hydroxide-simethicone  30 mL Oral Q6H PRN Kvng Yin MD      atorvastatin  40 mg Oral HS Kvng Yin MD      calcitriol  0 25 mcg Oral Daily Kvng Yin MD      cholecalciferol  800 Units Oral Daily Kvng Yin MD      citalopram  20 mg Oral Daily Kvng Yin MD      docusate sodium  100 mg Oral BID Tristin Zhang MD      fentanyl citrate (PF)  25 mcg Intravenous Q2H PRN Tristin Zhang MD      levothyroxine  75 mcg Oral Daily Tristin Zhang MD      melatonin  3 mg Oral HS Tristin Zhang MD      metoprolol tartrate  25 mg Oral BID Joshua Garrett MD      multi-electrolyte  75 mL/hr Intravenous Continuous Gersonpillo Roth MD 75 mL/hr (10/14/21 1053)    ondansetron  4 mg Intravenous Q6H PRN Tristin Zhang MD      piperacillin-tazobactam  2 25 g Intravenous Q6H Erlinda Delgado MD      saccharomyces boulardii  250 mg Oral Daily Tristin Zhang MD      senna  1 tablet Oral Daily Tristin Zhang MD      sodium bicarbonate  650 mg Oral BID after meals Tristin Zhang MD          Today, Patient Was Seen By: Joshua Garrett MD    **Please Note: This note may have been constructed using a voice recognition system  **

## 2021-10-14 NOTE — CASE MANAGEMENT
Met with pt, appeared confused  TCT son Laurent Jefferson  Discussed therapy recommendation for STR  He does not feel that will be needed  States he and his sister are coming to see pt  DCP will most likely be home with family  Aware CM available to assist with any DC needs    PT does have drains

## 2021-10-14 NOTE — PROGRESS NOTES
NEPHROLOGY HOSPITAL PROGRESS NOTE   Susan Falcon 77 y o  female MRN: [id number]  Unit/Bed#: [facility] 929-01 Encounter: [id number]  Reason for Consult: CKD V    ASSESSMENT and PLAN:    69-year-old female with past medical history of CKD, hypertension, hyperparathyroidism, anemia, pulmonary emboli, bilateral hydronephrosis secondary to obstructive uropathy and nonfunctional bladder status post supratrigonal cystectomy and ileal conduit in October 2016, status post nephrostomy tube placement in May 2021, status post left percutaneous retrograde percutaneous nephrostomy tube placement in September 2021 who initially presents with abdominal pain on October 9th   There is concern for hematoma   Also with elective right brachial basilic fistula superficialization with transposition on September 30th      1) CKD stage 5     - baseline creatinine 3-3 5 mg/dL  -outpatient nephrologist Dr Levy   -urinalysis 20-30 RBC, 20-30 WBC  - access-AV fistula with placed into January 2021 and status post fistulogram with angioplasty earlier this urine also status post transposition on September 30t  -10/11-creatinine stable   3 6 mg/dL   Potassium low being repleted   -10/12-creatinine stable 3 6   Bicarb lower   Urine output diminished  Given intravenous fluids  -10/13-creatinine rising 4 2 mg/dL  Will give fluids  CT scan noted with new loculated fluid collection and concern for obstruction  Worsening renal function may be in the setting of hypotension yesterday but also will need to rule out any obstructive issues  -10/14-continues to be hypotensive but slightly improving  Creatinine rising 4 6      Plan  -continue to hold diuretics    -give intravenous fluids today  -reviewed with primary team attending  -no urgent indication for dialysis as of yet the patient is approaching dialysis needs if renal function does not stabilize  -okay to continue oral bicarbonate tablets for now  -if signs of pulmonary edema, give Lasix  -BMP and phosphorus in a m   -patient appears slightly more confused today  Easily reoriented  Monitor closely per primary team   Unclear if uremia is alone playing a role but this is likely multifactorial  - avoid hypotension  - avoid nephrotoxic agents  -call son to review but no answer     2) sepsis- retroperitoneal hematoma-due to perinephric hematoma and now concern for sepsis     -urology on board-continued observation of left renal hematoma  -concern for possible sepsis due to initial fever and leukocytosis-ID on board-on empiric antibiotics  -antibiotics expanded to include cefepime and Flagyl per ID team due to bacteremia with fusiform bacterium  -urine culture with Klebsiella and Enterobacter  - CT scan with new loculated abdominal fluid collection  Also increase bowel dilation  -surgery team now on board along with IR team     3) electrolytes     -hypokalemia-repleted and improved     4) acid/base     -bicarbonate is improving  Give fluids with isolyte today     5) anemia-     -transfusion per primary team     6) history of cystectomy with ileal conduit in 2016 due to bladder dysfunction and 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 and 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 with left nephrostomy tube and nephroureteral catheter     -on October 4th, nephrostomy tube was removed  -most recent CT scan concerning for hemoperitoneum, perinephric hematoma     7) hemodialysis access-vascular surgery team on board due to arm swelling     -duplex--greater than 50% stenosis in the proximal subclavian vein, and basilic vein    -IR has been consulted for evaluation   Once the patient is more stable for fistulogram      8) MBD-on calcitriol and vitamin-D     9) volume-has whole body overloaded but is lying supine   Appears comfortable        10) pl effusion - per Primary team; small L effusion     -status post thoracentesis on 10/11 with 90 cc aspirated with inability to aspirate further  -follow-up thoracentesis studies per primary team     11) polycythemia vera-per primary team       SUBJECTIVE / 24H INTERVAL HISTORY:  Patient appears to more confused today  Slightly more lethargic  Easily reoriented  Denies complaints when reoriented      OBJECTIVE:  Current Weight: Weight - Scale: 86 5 kg (190 lb 11 2 oz)  Vitals:    10/13/21 2210 10/14/21 0050 10/14/21 0337 10/14/21 0756   BP: 98/55 101/60 102/58 111/60   Pulse: 87 90 87 95   Resp:  16 17 17   Temp:  98 2 °F (36 8 °C)  98 2 °F (36 8 °C)   TempSrc:  Oral     SpO2: 94% 96% 95% 97%   Weight:       Height:           Intake/Output Summary (Last 24 hours) at 10/14/2021 1037  Last data filed at 10/14/2021 0050  Gross per 24 hour   Intake 20 ml   Output 415 ml   Net -395 ml     General: NAD  Skin: no rash  Eyes: anicteric sclera  ENT:  Dry mucous membrane  Neck: supple  Chest: CTA b/l, no ronchii, no wheeze, no rubs, no rales  CVS: s1s2, no murmur, no gallop, no rub  Abdomen: soft, nontender, nl sounds, abdominal drain left quadrant in place  Extremities:  Trace anasarca edema LE b/l  : no camp  Neuro: AAOX2-3  Psych: normal affect    Medications:    Current Facility-Administered Medications:     acetaminophen (TYLENOL) tablet 650 mg, 650 mg, Oral, Q6H PRN, Imer Queen MD, 650 mg at 10/11/21 1739    acetaminophen (TYLENOL) tablet 975 mg, 975 mg, Oral, Q8H [address], Imer Queen MD, 975 mg at 10/14/21 0517    aluminum-magnesium hydroxide-simethicone (MYLANTA) oral suspension 30 mL, 30 mL, Oral, Q6H PRN, Imer Queen MD    atorvastatin (LIPITOR) tablet 40 mg, 40 mg, Oral, HS, Imer Queen MD, 40 mg at 10/13/21 2220    calcitriol (ROCALTROL) capsule 0 25 mcg, 0 25 mcg, Oral, Daily, Imer Queen MD, 0 25 mcg at 10/14/21 0831    cholecalciferol (VITAMIN D) oral liquid 800 Units, 800 Units, Oral, Daily, Imer Queen MD, 800 Units at 10/14/21 0831    citalopram (CeleXA) tablet 20 mg, 20 mg, Oral, Daily, Naya Russell MD, 20 mg at 10/14/21 0830    docusate sodium (COLACE) capsule 100 mg, 100 mg, Oral, BID, Naya Russell MD, 100 mg at 10/12/21 1101    fentanyl citrate (PF) 100 MCG/2ML 25 mcg, 25 mcg, Intravenous, Q2H PRN, Naya Russell MD    levothyroxine tablet 75 mcg, 75 mcg, Oral, Daily, Naya Russell MD, 75 mcg at 10/14/21 0830    melatonin tablet 3 mg, 3 mg, Oral, HS, Naya Russell MD, 3 mg at 10/13/21 2220    metoprolol tartrate (LOPRESSOR) tablet 25 mg, 25 mg, Oral, BID, Ej Torre MD, 25 mg at 10/14/21 0830    metroNIDAZOLE (FLAGYL) tablet 500 mg, 500 mg, Oral, Q8H [address], Maria Luisa Ge MD, 500 mg at 10/14/21 0516    multi-electrolyte (PLASMALYTE-A/ISOLYTE-S PH 7 4) IV solution, 75 mL/hr, Intravenous, Continuous, Alysha Villalobos MD    ondansetron [facility]) injection 4 mg, 4 mg, Intravenous, Q6H PRN, Naya Russell MD, 4 mg at 10/13/21 2235    saccharomyces boulardii (FLORASTOR) capsule 250 mg, 250 mg, Oral, Daily, Naya Russell MD, 250 mg at 10/14/21 0830    senna (SENOKOT) tablet 8 6 mg, 1 tablet, Oral, Daily, Naya Russell MD, 8 6 mg at 10/12/21 1101    sodium bicarbonate tablet 650 mg, 650 mg, Oral, BID after meals, Naya Russell MD, 650 mg at 10/14/21 0830    Laboratory Results:  Results from last 7 days   Lab Units 10/14/21  0435 10/13/21  0517 10/12/21  0145 10/11/21  1652 10/11/21  1059 10/11/21  0337 10/11/21  0028 10/10/21  1647 10/10/21  0621 10/09/21  1447   WBC Thousand/uL  --  7 21 3 96*  --   --  4 85  --   --  4 97 6 15   HEMOGLOBIN g/dL  --  8 3* 9 3* 9 2* 8 5* 6 9* 6 7* 7 2* 7 1* 9 3*   HEMATOCRIT %  --  26 3* 29 7* 29 0* 27 1* 22 0* 21 5* 23 0* 23 2* 29 8*   PLATELETS Thousands/uL  --  243 244  --   --  212  --   --  187 276   POTASSIUM mmol/L 3 8 3 6 3 3*  --   --  3 0*  --   --  3 8 3 2*   CHLORIDE mmol/L 114* 114* 115*  --   --  113*  --   --  109* 110*   CO2 mmol/L 19* 18* 15*  --   --  20*  --   --  18* 16* BUN mg/dL 74* 65* 53*  --   --  51*  --   --  44* 37*   CREATININE mg/dL 4 56* 4 15* 3 63*  --   --  3 55*  --   --  3 60* 3 26*   CALCIUM mg/dL 7 9* 7 9* 8 0*  --   --  7 9*  --   --  8 5 9 3   MAGNESIUM mg/dL 2 1 2 4 2 3  --   --  1 7  --   --  1 7  --    PHOSPHORUS mg/dL 2 7 2 3 3 2  --   --  5 0*  --   --  4 9*  --

## 2021-10-15 ENCOUNTER — APPOINTMENT (INPATIENT)
Dept: NON INVASIVE DIAGNOSTICS | Facility: HOSPITAL | Age: 76
DRG: 981 | End: 2021-10-15
Payer: COMMERCIAL

## 2021-10-15 LAB
ANION GAP SERPL CALCULATED.3IONS-SCNC: 12 MMOL/L (ref 4–13)
AORTIC ROOT: 2.9 CM
AORTIC VALVE MEAN VELOCITY: 14 M/S
APICAL FOUR CHAMBER EJECTION FRACTION: 59 %
ASCENDING AORTA: 3.5 CM
ATRIAL RATE: 119 BPM
ATRIAL RATE: 125 BPM
AV AREA BY CONTINUOUS VTI: 1 CM2
AV AREA PEAK VELOCITY: 1.1 CM2
AV LVOT MEAN GRADIENT: 5 MMHG
AV LVOT PEAK GRADIENT: 9 MMHG
AV MEAN GRADIENT: 9 MMHG
AV PEAK GRADIENT: 20 MMHG
AV VALVE AREA: 1.02 CM2
BUN SERPL-MCNC: 86 MG/DL (ref 5–25)
CALCIUM SERPL-MCNC: 8 MG/DL (ref 8.3–10.1)
CHLORIDE SERPL-SCNC: 112 MMOL/L (ref 100–108)
CO2 SERPL-SCNC: 17 MMOL/L (ref 21–32)
CREAT SERPL-MCNC: 4.81 MG/DL (ref 0.6–1.3)
DOP CALC AO VTI: 41.78 CM
DOP CALC LVOT AREA: 1.54 CM2
DOP CALC LVOT DIAMETER: 1.4 CM
DOP CALC LVOT PEAK VEL VTI: 27.75 CM
DOP CALC LVOT PEAK VEL: 1.53 M/S
DOP CALC LVOT STROKE INDEX: 23 ML/M2
DOP CALC LVOT STROKE VOLUME: 42.7 CM3
E WAVE DECELERATION TIME: 220 MS
ERYTHROCYTE [DISTWIDTH] IN BLOOD BY AUTOMATED COUNT: 18.6 % (ref 11.6–15.1)
FRACTIONAL SHORTENING: 33 % (ref 28–44)
GFR SERPL CREATININE-BSD FRML MDRD: 8 ML/MIN/1.73SQ M
GLUCOSE SERPL-MCNC: 105 MG/DL (ref 65–140)
HCT VFR BLD AUTO: 28 % (ref 34.8–46.1)
HGB BLD-MCNC: 8.6 G/DL (ref 11.5–15.4)
INTERVENTRICULAR SEPTUM IN DIASTOLE (PARASTERNAL SHORT AXIS VIEW): 1 CM
LEFT INTERNAL DIMENSION IN SYSTOLE: 3.1 CM (ref 2.1–4)
LEFT VENTRICULAR INTERNAL DIMENSION IN DIASTOLE: 4.6 CM (ref 4.6–6.85)
LEFT VENTRICULAR POSTERIOR WALL IN END DIASTOLE: 1.4 CM
LEFT VENTRICULAR STROKE VOLUME: 58 ML
LV EF: 55 %
MAGNESIUM SERPL-MCNC: 2.5 MG/DL (ref 1.6–2.6)
MCH RBC QN AUTO: 28.5 PG (ref 26.8–34.3)
MCHC RBC AUTO-ENTMCNC: 30.7 G/DL (ref 31.4–37.4)
MCV RBC AUTO: 93 FL (ref 82–98)
MV E'TISSUE VEL-SEP: 9 CM/S
MV PEAK A VEL: 0.52 M/S
MV PEAK E VEL: 101 CM/S
MV STENOSIS PRESSURE HALF TIME: 0 MS
PA SYSTOLIC PRESSURE: 54 MMHG
PHOSPHATE SERPL-MCNC: 4.1 MG/DL (ref 2.3–4.1)
PLATELET # BLD AUTO: 194 THOUSANDS/UL (ref 149–390)
PMV BLD AUTO: 10.9 FL (ref 8.9–12.7)
POTASSIUM SERPL-SCNC: 4.2 MMOL/L (ref 3.5–5.3)
QRS AXIS: -12 DEGREES
QRS AXIS: -7 DEGREES
QRSD INTERVAL: 86 MS
QRSD INTERVAL: 86 MS
QT INTERVAL: 308 MS
QT INTERVAL: 348 MS
QTC INTERVAL: 437 MS
QTC INTERVAL: 495 MS
RBC # BLD AUTO: 3.02 MILLION/UL (ref 3.81–5.12)
RIGHT VENTRICLE ID DIMENSION: 4 CM
RV PSP: 49 MMHG
SL CV PED ECHO LEFT VENTRICLE DIASTOLIC VOLUME (MOD BIPLANE) 2D: 95 ML
SL CV PED ECHO LEFT VENTRICLE SYSTOLIC VOLUME (MOD BIPLANE) 2D: 37 ML
SODIUM SERPL-SCNC: 141 MMOL/L (ref 136–145)
T WAVE AXIS: 50 DEGREES
T WAVE AXIS: 66 DEGREES
T4 FREE SERPL-MCNC: 1.04 NG/DL (ref 0.76–1.46)
TR PEAK VELOCITY: 3.5 M/S
TRICUSPID VALVE PEAK REGURGITATION VELOCITY: 3.62 M/S
TRICUSPID VALVE S': 1.5 CM/S
TSH SERPL DL<=0.05 MIU/L-ACNC: 11.5 UIU/ML (ref 0.36–3.74)
TV PEAK GRADIENT: 52 MMHG
VENTRICULAR RATE: 121 BPM
VENTRICULAR RATE: 122 BPM
WBC # BLD AUTO: 6.2 THOUSAND/UL (ref 4.31–10.16)
Z-SCORE OF LEFT VENTRICULAR DIMENSION IN END SYSTOLE: -1.99

## 2021-10-15 PROCEDURE — 99222 1ST HOSP IP/OBS MODERATE 55: CPT | Performed by: INTERNAL MEDICINE

## 2021-10-15 PROCEDURE — 99232 SBSQ HOSP IP/OBS MODERATE 35: CPT | Performed by: INTERNAL MEDICINE

## 2021-10-15 PROCEDURE — 84439 ASSAY OF FREE THYROXINE: CPT | Performed by: STUDENT IN AN ORGANIZED HEALTH CARE EDUCATION/TRAINING PROGRAM

## 2021-10-15 PROCEDURE — 85027 COMPLETE CBC AUTOMATED: CPT | Performed by: INTERNAL MEDICINE

## 2021-10-15 PROCEDURE — 97535 SELF CARE MNGMENT TRAINING: CPT

## 2021-10-15 PROCEDURE — 97530 THERAPEUTIC ACTIVITIES: CPT

## 2021-10-15 PROCEDURE — 93306 TTE W/DOPPLER COMPLETE: CPT

## 2021-10-15 PROCEDURE — 93005 ELECTROCARDIOGRAM TRACING: CPT

## 2021-10-15 PROCEDURE — 93010 ELECTROCARDIOGRAM REPORT: CPT | Performed by: INTERNAL MEDICINE

## 2021-10-15 PROCEDURE — 84100 ASSAY OF PHOSPHORUS: CPT | Performed by: INTERNAL MEDICINE

## 2021-10-15 PROCEDURE — 84443 ASSAY THYROID STIM HORMONE: CPT | Performed by: STUDENT IN AN ORGANIZED HEALTH CARE EDUCATION/TRAINING PROGRAM

## 2021-10-15 PROCEDURE — 83735 ASSAY OF MAGNESIUM: CPT | Performed by: INTERNAL MEDICINE

## 2021-10-15 PROCEDURE — 80048 BASIC METABOLIC PNL TOTAL CA: CPT | Performed by: INTERNAL MEDICINE

## 2021-10-15 PROCEDURE — 99233 SBSQ HOSP IP/OBS HIGH 50: CPT | Performed by: INTERNAL MEDICINE

## 2021-10-15 PROCEDURE — 93306 TTE W/DOPPLER COMPLETE: CPT | Performed by: INTERNAL MEDICINE

## 2021-10-15 RX ORDER — METOPROLOL TARTRATE 5 MG/5ML
2.5 INJECTION INTRAVENOUS EVERY 6 HOURS PRN
Status: DISCONTINUED | OUTPATIENT
Start: 2021-10-15 | End: 2021-11-13 | Stop reason: HOSPADM

## 2021-10-15 RX ORDER — ALBUMIN (HUMAN) 12.5 G/50ML
12.5 SOLUTION INTRAVENOUS ONCE
Status: COMPLETED | OUTPATIENT
Start: 2021-10-15 | End: 2021-10-15

## 2021-10-15 RX ADMIN — SODIUM BICARBONATE 75 ML/HR: 84 INJECTION, SOLUTION INTRAVENOUS at 13:45

## 2021-10-15 RX ADMIN — LEVOTHYROXINE SODIUM 75 MCG: 75 TABLET ORAL at 11:24

## 2021-10-15 RX ADMIN — MELATONIN TAB 3 MG 3 MG: 3 TAB at 22:43

## 2021-10-15 RX ADMIN — CALCITRIOL 0.25 MCG: 0.25 CAPSULE, LIQUID FILLED ORAL at 11:23

## 2021-10-15 RX ADMIN — ATORVASTATIN CALCIUM 40 MG: 40 TABLET, FILM COATED ORAL at 22:43

## 2021-10-15 RX ADMIN — PIPERACILLIN AND TAZOBACTAM 2.25 G: 36; 4.5 INJECTION, POWDER, FOR SOLUTION INTRAVENOUS at 23:25

## 2021-10-15 RX ADMIN — METOROPROLOL TARTRATE 2.5 MG: 5 INJECTION, SOLUTION INTRAVENOUS at 04:38

## 2021-10-15 RX ADMIN — ACETAMINOPHEN 975 MG: 325 TABLET, FILM COATED ORAL at 22:42

## 2021-10-15 RX ADMIN — SODIUM CHLORIDE, SODIUM GLUCONATE, SODIUM ACETATE, POTASSIUM CHLORIDE, MAGNESIUM CHLORIDE, SODIUM PHOSPHATE, DIBASIC, AND POTASSIUM PHOSPHATE 75 ML/HR: .53; .5; .37; .037; .03; .012; .00082 INJECTION, SOLUTION INTRAVENOUS at 00:51

## 2021-10-15 RX ADMIN — METOPROLOL TARTRATE 25 MG: 25 TABLET, FILM COATED ORAL at 06:34

## 2021-10-15 RX ADMIN — ACETAMINOPHEN 975 MG: 325 TABLET, FILM COATED ORAL at 05:41

## 2021-10-15 RX ADMIN — PIPERACILLIN AND TAZOBACTAM 2.25 G: 36; 4.5 INJECTION, POWDER, FOR SOLUTION INTRAVENOUS at 03:26

## 2021-10-15 RX ADMIN — PIPERACILLIN AND TAZOBACTAM 2.25 G: 36; 4.5 INJECTION, POWDER, FOR SOLUTION INTRAVENOUS at 17:12

## 2021-10-15 RX ADMIN — CITALOPRAM HYDROBROMIDE 20 MG: 20 TABLET ORAL at 11:24

## 2021-10-15 RX ADMIN — ALBUMIN (HUMAN) 12.5 G: 0.25 INJECTION, SOLUTION INTRAVENOUS at 06:40

## 2021-10-15 RX ADMIN — Medication 250 MG: at 11:24

## 2021-10-15 RX ADMIN — PIPERACILLIN AND TAZOBACTAM 2.25 G: 36; 4.5 INJECTION, POWDER, FOR SOLUTION INTRAVENOUS at 11:23

## 2021-10-15 RX ADMIN — ACETAMINOPHEN 975 MG: 325 TABLET, FILM COATED ORAL at 13:45

## 2021-10-15 NOTE — PLAN OF CARE
[facility] for [facility]  Unable to provide update at that time d/t circumstances on unit  Advised pt's daughter an update will be provided in pt's room momentarily  Pt's daughter not at bedside when this Rn arrived to room, attempted to call Peter Arcos (pt's daughter) listed as emergency contact to update, but she did not answer phone  Will update if she calls in to unit  JOSE ANTONIO drains flushed w/o issue during shift  Problem: MOBILITY - ADULT  Goal: Maintain or return to baseline ADL function  Description: INTERVENTIONS:  -  Assess patient's ability to carry out ADLs; assess patient's baseline for ADL function and identify physical deficits which impact ability to perform ADLs (bathing, care of mouth/teeth, toileting, grooming, dressing, etc )  - Assess/evaluate cause of self-care deficits   - Assess range of motion  - Assess patient's mobility; develop plan if impaired  - Assess patient's need for assistive devices and provide as appropriate  - Encourage maximum independence but intervene and supervise when necessary  - Involve family in performance of ADLs  - Assess for home care needs following discharge   - Consider OT consult to assist with ADL evaluation and planning for discharge  - Provide patient education as appropriate  Outcome: Progressing  Goal: Maintains/Returns to pre admission functional level  Description: INTERVENTIONS:  - Perform BMAT or MOVE assessment daily    - Set and communicate daily mobility goal to care team and patient/family/caregiver     - Collaborate with rehabilitation services on mobility goals if consulted    - Out of bed for toileting  - Record patient progress and toleration of activity level   Outcome: Progressing

## 2021-10-15 NOTE — NURSING NOTE
Patient continued to remain tachycardic after one dose of IV lopressor  CYNTHIA Lobo of [facility] notified  Per GERRI, give AM PO dose of metoprolol early  Patient received dose  HR is currently in 70-80s

## 2021-10-15 NOTE — ASSESSMENT & PLAN NOTE
Hx of Polycythemia vera and MDS  Significant anemia secondary to blood loss in the past   The patient is currently off of the [facility] treatment for her PV and getting mainly Aranesp on every 28 day basis  Hematology consulted for evaluation, recommend once patient is better/ stable, discharge, she will follow-up with Dr Kristen Cintron,  her primary hematologist

## 2021-10-15 NOTE — PLAN OF CARE
Problem: PHYSICAL THERAPY ADULT  Goal: Performs mobility at highest level of function for planned discharge setting  See evaluation for individualized goals  Description: Treatment/Interventions: Patient/family training, LE strengthening/ROM, Bed mobility, Spoke to nursing, Spoke to case management, OT  Equipment Recommended:  (TBD )       See flowsheet documentation for full assessment, interventions and recommendations  Outcome: Progressing  Note: Prognosis: Fair  Problem List: Decreased strength, Decreased endurance, Impaired balance, Decreased mobility, Decreased coordination, Decreased cognition, Impaired judgement, Decreased safety awareness  Assessment: PT presented slid down in bed saying "help me" 2* positioning and bowel accident  Pt performed rolling L/R multiple times to assist with cleaning up bowel accident  Pt able to hold s/l position for 5 minx2 with CGA  STS transfers attempted  Pt only able to perform 1/2 stand with MaxAx2 2* LE weakness  Upon sitting EOB, pt expressing she wants to lie down despite max encouragement to stay sitting EOB  Assisted pt back to bed and pt left 1/4 turned back with bed alarm, phone, call bell, and all other personal needs within reach  Pt will continue to benefit from skilled acute care PT to further address her functional mobility limitations  Updated transfer goal as indicated above  D/C recommendations remain rehab  Barriers to Discharge: Inaccessible home environment, Decreased caregiver support        PT Discharge Recommendation: Post acute rehabilitation services     PT - OK to Discharge: Yes (to rehab when medically cleared)    See flowsheet documentation for full assessment

## 2021-10-15 NOTE — PROGRESS NOTES
Progress Note - Infectious Disease   Haresh De Paz 77 y o  female MRN: [id number]  [facility] Encounter: [id number]      Impression/Plan:  1  Sepsis, POA   Patient presented with progressing flank/abdominal discomfort likely related to problem 3  Hemoglobin had declined  Glory Horn is without leukocytosis   On initial evaluation she was noted to have fever along with tachycardia which have improved  Cultures positive as below  Progressive fatigue/decline likely multifactorial from 3 and 4  Continue Zosyn  Continue to trend fever curve/vitals  Repeat CBC with diff and BMP  Follow-up pending blood/body fluid/thoracentesis cultures and adjust antibiotics as needed  Monitor abdominal/flank exam  Urology/nephrology follow-up ongoing  Oncology evaluation noted  Additional supportive care as per primary     2  Fusobacterium bacteremia   Source unknown, possibly transient given 1  Atypical pathogen to be isolated and cause urinary tract infection   CT imaging of the abdomen on admission unremarkable  Repeat imaging with new collections noted in the abdomen and partial SBO, potential source   Patient without any teeth  Repeat blood cultures without growth  Antibiotics as above  Continue to trend fever curve/WBC  Follow-up pending cultures  Ongoing follow-up by General surgery     3  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   Patient has a chronic obstructive uropathy involving the left side and recently had PCN removal   Subsequently developed bleeding at the site and hematoma on imaging likely due to chronic anticoagulation  IR aspiration on 10/13, Gram stain polymicrobial   Prior urine cultures noted  Preliminary cultures suggest a gram-negative matthew possibly consistent with Klebsiella, and either alpha Streptococcus or Enterococcus which would be consistent with what is found in the urine    Antibiotics as above  Follow-up pending cultures  Ongoing follow-up by IR     4  Chronic kidney disease with fistula  Patient with baseline chronic kidney disease with fistula created in the right upper extremity   She has significant swelling which she reported had been present since after procedure  Repeat BMP  Ongoing follow-up by Nephrology  Vascular surgery follow-up/imaging on going  Avoid PICC line if possible     5  History of C diff and ongoing diarrhea   Patient had a history of C diff in   She has had repeat PCRs in 2021 which were negative   She remains on antibiotic as above   Reports diarrhea and repeat testing negative again   Suspect antibiotic associated diarrhea  Continue systemic antibiotic  Monitor stool output  No C diff prophylaxis     6  Polycythemia vera and MDS   Ongoing management/supportive care as per primary   Oncology follow-up ongoing      7  Prior PE on anticoagulation   Likely risk factor for issues as above  AC as per primary        8  Transaminitis   Patient noted to have slowly increasing alkaline phosphatase and AST   In the setting of this bacteremia would question if she is developing an occult liver abscess  Consider occult intraluminal GI source  Now downtrending  Monitor LFTs  Antibiotics as above  Consider GI consult if LFTs remain elevated  Consider liver ultrasound    Discussed the above management plan with the primary service    Infectious Disease service will see the patient again 10/18/2021  Please call if questions    Antibiotics:  Total antibiotics 7  Zosyn 2    Subjective:  Patient has no fever, chills, sweats; no nausea, vomiting, diarrhea; no cough, shortness of breath; no pain  No new symptoms      Objective:  Vitals:  Temp:  [96 6 °F (35 9 °C)] 96 6 °F (35 9 °C)  HR:  [] 114  Resp:  [16-18] 18  BP: ()/(55-80) 94/56  SpO2:  [92 %-95 %] 93 %  Temp (24hrs), Av 6 °F (35 9 °C), Min:96 6 °F (35 9 °C), Max:96 6 °F (35 9 °C)  Current: Temperature: (!) 96 6 °F (35 9 °C)    Physical Exam:   General Appearance:  Alert, interactive, nontoxic, no acute distress  Throat: Oropharynx moist without lesions  Lungs:   Clear to auscultation bilaterally; no wheezes, rhonchi or rales; respirations unlabored   Heart:  Tachycardia; no murmur, rub or gallop   Abdomen:   Soft, non-tender, non-distended, positive bowel sounds  Abdominal drains in place    Extremities: No clubbing, cyanosis or edema   Skin: No new rashes or lesions  No draining wounds noted  Labs, Imaging, & Other studies:   All pertinent labs and imaging studies were personally reviewed  Results from last 7 days   Lab Units 10/15/21  0450 10/13/21  0517 10/12/21  0145   WBC Thousand/uL 6 20 7 21 3 96*   HEMOGLOBIN g/dL 8 6* 8 3* 9 3*   PLATELETS Thousands/uL 194 243 244     Results from last 7 days   Lab Units 10/15/21  0450 10/14/21  0435 10/13/21  0517 10/10/21  0621   SODIUM mmol/L 141 142 142 138   POTASSIUM mmol/L 4 2 3 8 3 6 3 8   CHLORIDE mmol/L 112* 114* 114* 109*   CO2 mmol/L 17* 19* 18* 18*   BUN mg/dL 86* 74* 65* 44*   CREATININE mg/dL 4 81* 4 56* 4 15* 3 60*   EGFR ml/min/1 73sq m 8 9 10 12   CALCIUM mg/dL 8 0* 7 9* 7 9* 8 5   AST U/L  --  80* 90* 72*   ALT U/L  --  11* 15 15   ALK PHOS U/L  --  185* 244* 81     Results from last 7 days   Lab Units 10/13/21  1928 10/13/21  1923 10/12/21  0156 10/11/21  1851 10/11/21  0222 10/10/21  1256 10/09/21  1447 10/09/21  1444   BLOOD CULTURE   --   --  No Growth at 72 hrs    No Growth at 72 hrs   --   --   --  Fusobacterium mortiferum* Fusobacterium mortiferum*   GRAM STAIN RESULT  1+ Polys*  2+ Gram positive cocci in pairs*  2+ Gram positive rods*  1+ Gram negative rods* No Polys or Bacteria seen  --  1+ Polys  No organisms seen  --   --  Gram negative rods* Gram negative rods*   URINE CULTURE   --   --   --   --   --  >100,000 cfu/ml Klebsiella pneumoniae*  >100,000 cfu/ml Enterobacter aerogenes*  20,000-29,000 cfu/ml Enterococcus faecalis*  <10,000 cfu/ml Enterococcus avium*  --   --    BODY FLUID CULTURE, STERILE   --   --   --  No growth  --   --   --   --    C DIFF TOXIN B BY PCR   --   --   --   --  Negative  --   --   --      Results from last 7 days   Lab Units 10/10/21  0621 10/09/21  1447   PROCALCITONIN ng/ml 78 78* 21 02*         Results from last 7 days   Lab Units 10/10/21  0621   FERRITIN ng/mL 1,650*  1,668*

## 2021-10-15 NOTE — ASSESSMENT & PLAN NOTE
Sepsis POA  Poor source control with abscess/loculated collection seen on CT scan today  S/p drain by IR>   C/w abx as per ID     Overall poor prognosis  Hopefully antibiotics and source control will improve her overall clinical picture

## 2021-10-15 NOTE — PLAN OF CARE
Problem: OCCUPATIONAL THERAPY ADULT  Goal: Performs self-care activities at highest level of function for planned discharge setting  See evaluation for individualized goals  Description: Treatment Interventions: ADL retraining, Functional transfer training, UE strengthening/ROM, Endurance training, Patient/family training, Equipment evaluation/education, Compensatory technique education, Continued evaluation, Activityengagement, Energy conservation          See flowsheet documentation for full assessment, interventions and recommendations  Outcome: Progressing  Note: Limitation: Decreased ADL status, Decreased UE ROM, Decreased UE strength, Decreased endurance, Decreased self-care trans, Decreased high-level ADLs  Prognosis: Fair  Assessment: Patient participated in Skilled OT session this date with interventions consisting of ADL re training with the use of correct body mechnaics, safety awareness and fall prevention techniques,  therapeutic activities to: increase activity tolerance, increase dynamic sit/ stand balance during functional activity  and increase OOB/ sitting tolerance   Upon arrival patient was found supine in bed  Pt demonstrated the following tasks: MOD A to roll, MAX A x 2 sup <> sit, MAX A x 2 ~50% STS, MIN A to maintain EOB sitting position  Pt incontinent of bowel  Requires MAX A for hygiene, LB bathing, + LBD  Patient continues to be functioning below baseline level, occupational performance remains limited secondary to factors listed above and increased risk for falls and injury  From OT standpoint, recommendation at time of d/c would be STR  Patient to benefit from continued Occupational Therapy treatment while in the hospital to address deficits as defined above and maximize level of functional independence with ADLs and functional mobility  Pt was left in bed with alarm on after session with all current needs met  Please see goals added to POC listed below   The patient's raw score on the AM-PAC Daily Activity inpatient short form is 13, standardized score is 32 03, less than 39 4  Patients at this level are likely to benefit from discharge to post-acute rehabilitation services  Please refer to the recommendation of the Occupational Therapist for safe discharge planning       OT Discharge Recommendation: Post acute rehabilitation services  OT - OK to Discharge: Yes (to rehab when medically stable )

## 2021-10-15 NOTE — ASSESSMENT & PLAN NOTE
Lab Results   Component Value Date    EGFR 8 10/15/2021    EGFR 9 10/14/2021    EGFR 10 10/13/2021    CREATININE 4 81 (H) 10/15/2021    CREATININE 4 56 (H) 10/14/2021    CREATININE 4 15 (H) 10/13/2021   worsening RF  In the setting of sepsis  Discussed with son regarding poor prognosis     Discussed with nephrology regarding overall poor state of health

## 2021-10-15 NOTE — ASSESSMENT & PLAN NOTE
Left renal hematoma:   Presented with left renal hematoma  Pigtail catheter is noted medial to kidney  Renal pelvis may be collapsed  Hemorrhage and thickening extending in the combined interfascial place of retrospective as well as some high density fluid within     Urology on board  No plan for intervention per urology at this time  Observation and treat the infection  Hemoglobin is 8 6  Kidneys are stable on CT scan

## 2021-10-15 NOTE — PHYSICAL THERAPY NOTE
PHYSICAL THERAPY NOTE          Patient Name: Mauricio Beach  [id number] Date: 10/15/2021     10/15/21 1035   PT Last Visit   PT Visit Date 10/15/21   Note Type   Note Type Treatment   Pain Assessment   Pain Assessment Tool 0-10   Pain Score No Pain   Restrictions/Precautions   Weight Bearing Precautions Per Order No   Other Precautions Contact/isolation;Cognitive; Bed Alarm; Fall Risk;Pain;Multiple lines   General   Chart Reviewed Yes   Response to Previous Treatment Patient with no complaints from previous session  Family/Caregiver Present No   Cognition   Overall Cognitive Status Impaired   Arousal/Participation Cooperative   Attention Attends with cues to redirect   Orientation Level Oriented to person;Disoriented to situation;Disoriented to time   Memory Decreased short term memory;Decreased recall of recent events   Following Commands Follows one step commands with increased time or repetition   Subjective   Subjective Pt stating "help me" 2* being slide down in bed and having bowel accident   Bed Mobility   Rolling R 3  Moderate assistance   Additional items Assist x 1; Increased time required;Verbal cues;LE management   Rolling L 3  Moderate assistance   Additional items Assist x 1; Increased time required;Verbal cues;LE management   Supine to Sit 2  Maximal assistance   Additional items Assist x 1; Increased time required;Verbal cues;HOB elevated  (UB management)   Sit to Supine 2  Maximal assistance   Additional items Assist x 1; Increased time required;Verbal cues   Transfers   Sit to Stand 2  Maximal assistance   Additional items Assist x 1; Increased time required;Verbal cues  (1/2 stands )   Stand to Sit 2  Maximal assistance   Additional items Assist x 2; Increased time required;Verbal cues   Additional Comments pt unable to achieve full stands 2* B/L LE weakness   Ambulation/Elevation   Gait pattern Not appropriate   Balance Static Sitting Poor +   Dynamic Sitting Poor +   Endurance Deficit   Endurance Deficit Yes   Endurance Deficit Description fatigue, weakness    Activity Tolerance   Activity Tolerance Patient limited by fatigue   Medical Staff Made Aware co-treat with OTSharron 2* medical complexity and increased assistance levels required    Nurse Made Aware pt cleared to see and mobilize per nursing    Exercises   Balance training  static sitting EOB x5 min with MinAx1   Assessment   Prognosis Fair   Problem List Decreased strength;Decreased endurance; Impaired balance;Decreased mobility; Decreased coordination;Decreased cognition; Impaired judgement;Decreased safety awareness   Assessment PT presented slid down in bed saying "help me" 2* positioning and bowel accident  Pt performed rolling L/R multiple times to assist with cleaning up bowel accident  Pt able to hold s/l position for 5 minx2 with CGA  STS transfers attempted  Pt only able to perform 1/2 stand with MaxAx2 2* LE weakness  Upon sitting EOB, pt expressing she wants to lie down despite max encouragement to stay sitting EOB  Assisted pt back to bed and pt left 1/4 turned back with bed alarm, phone, call bell, and all other personal needs within reach  Pt will continue to benefit from skilled acute care PT to further address her functional mobility limitations  Updated transfer goal as indicated above  D/C recommendations remain rehab  Barriers to Discharge Inaccessible home environment;Decreased caregiver support   Goals   Patient Goals to rest   STG Expiration Date 10/25/21   Short Term Goal #2 Additional transfer goal: pt will transfer c MinAx1 to promote functional independence and decrease caregiver burden  PT Treatment Day 2   Plan   Treatment/Interventions Functional transfer training;LE strengthening/ROM; Therapeutic exercise; Endurance training;Patient/family training;Equipment eval/education; Bed mobility;Cognitive reorientation;Continued evaluation;OT Progress Slow progress, decreased activity tolerance   PT Frequency Other (Comment)  (3-5x/wk)   Recommendation   PT Discharge Recommendation Post acute rehabilitation services   Equipment Recommended   (TBD)   PT - OK to Discharge Yes  (to rehab when medically cleared)   AM-PAC Basic Mobility Inpatient   Turning in Bed Without Bedrails 2   Lying on Back to Sitting on Edge of Flat Bed 1   Moving Bed to Chair 1   Standing Up From Chair 1   Walk in Room 1   Climb 3-5 Stairs 1   Basic Mobility Inpatient Raw Score 7   Turning Head Towards Sound 4   Follow Simple Instructions 3   Low Function Basic Mobility Raw Score 14   Low Function Basic Mobility Standardized Score 22 01   Wale Hair, PT

## 2021-10-15 NOTE — PROGRESS NOTES
[address] [facility]  Progress Note - Anaya Fowler [birth year], 77 y o  female MRN: [id number]  Unit/Bed#: [facility] 929-01 Encounter: [id number]  Primary Care Provider: Liana Lim MD   Date and time admitted to hospital: 10/9/2021  2:19 PM    * Sepsis [facility])  Assessment & Plan  Sepsis POA  Poor source control with abscess/loculated collection seen on CT scan today  S/p drain by IR>   C/w abx as per ID  Overall poor prognosis  Hopefully antibiotics and source control will improve her overall clinical picture        Localized swelling of right upper extremity  Assessment & Plan  Pt presented with right upper extremity swelling  Vascular surgery consulted on admission  IR also consulted for fistulagram, plan to do fistulagram once patient is more stable  US doppler showed: There is a >50% stenosis noted in the proximal subclavian vein  There is a >50%  stenosis noted the proximal basilic vein, which also torturous  The dialysis AVF appears to be matured with adequate diameter, flow volumes and  less than 6mm in depth throughout the arm  Vascular and IR following  Pleural effusion  Assessment & Plan  Mild to moderate pleural effusion on CXR      Renal hematoma, left  Assessment & Plan    Left renal hematoma:   Presented with left renal hematoma  Pigtail catheter is noted medial to kidney  Renal pelvis may be collapsed  Hemorrhage and thickening extending in the combined interfascial place of retrospective as well as some high density fluid within     Urology on board  No plan for intervention per urology at this time  Observation and treat the infection  Hemoglobin is 8 6  Kidneys are stable on CT scan          Acute renal failure superimposed on stage 5 chronic kidney disease, not on chronic dialysis [facility])  Assessment & Plan  Lab Results   Component Value Date    EGFR 8 10/15/2021    EGFR 9 10/14/2021    EGFR 10 10/13/2021    CREATININE 4 81 (H) 10/15/2021    CREATININE 4 56 (H) 10/14/2021    CREATININE 4 15 (H) 10/13/2021   worsening RF  In the setting of sepsis  Discussed with son regarding poor prognosis  Discussed with nephrology regarding overall poor state of health     Anemia due to stage 5 chronic kidney disease, not on chronic dialysis [facility])  Assessment & Plan    Anemia 2/2 CKD with component of acute blood loss anemia  Transfused  Yesterday  8 6 in the setting of IVF  Will continue to follow  Polycythemia vera ([address] )  Assessment & Plan  Hx of Polycythemia vera and MDS  Significant anemia secondary to blood loss in the past   The patient is currently off of the [facility] treatment for her PV and getting mainly Aranesp on every 28 day basis  Hematology consulted for evaluation, recommend once patient is better/ stable, discharge, she will follow-up with Dr Duc Arellano,  her primary hematologist     Hypertension  Assessment & Plan  Continue with metoprolol 25 mg bid  Hold for low BP  Last night hypotensive with molina continue with hold parameters  Obstructive uropathy  Assessment & Plan  Obstructive nephropathy  Chronic bilateral hydronephrosis stents were recently removed  Urology are following       Chronic saddle pulmonary embolism (HCC)  Assessment & Plan  Chronic saddle pulmonary embolus  Eliquis 2 5 mg bid at home, currently on hold for now if IR wants to do intervention  Will discuss with specialists regarding restarting eliquis  VTE Pharmacologic Prophylaxis: VTE Score: 13 Moderate Risk (Score 3-4) - Pharmacological DVT Prophylaxis Ordered: heparin  Patient Centered Rounds: I performed bedside rounds with nursing staff today  Discussions with Specialists or Other Care Team Provider: Discussed with nephrology  Education and Discussions with Family / Patient: Updated  (son) via phone  Time Spent for Care: 30 minutes  More than 50% of total time spent on counseling and coordination of care as described above      Current Length of Stay: 6 day(s)  Current Patient Status: Inpatient   Certification Statement: The patient will continue to require additional inpatient hospital stay due to acute infection   Discharge Plan: Anticipate discharge in >72 hrs to unclear  Code Status: Level 1 - Full Code    Subjective:   Patient seen and examined   More awake  Answering most questions appropriately  Objective:     Vitals:   Temp (24hrs), Av 7 °F (36 5 °C), Min:97 7 °F (36 5 °C), Max:97 7 °F (36 5 °C)    Temp:  [97 7 °F (36 5 °C)] 97 7 °F (36 5 °C)  HR:  [] 76  Resp:  [16-18] 16  BP: ()/(55-80) 94/55  SpO2:  [93 %-95 %] 95 %  Body mass index is 37 11 kg/m²  Input and Output Summary (last 24 hours): Intake/Output Summary (Last 24 hours) at 10/15/2021 1735  Last data filed at 10/15/2021 1409  Gross per 24 hour   Intake 702 5 ml   Output 165 ml   Net 537 5 ml       Physical Exam:   Physical Exam  Constitutional:       General: She is not in acute distress  Appearance: She is ill-appearing  She is not toxic-appearing  HENT:      Head: Normocephalic  Eyes:      Pupils: Pupils are equal, round, and reactive to light  Cardiovascular:      Rate and Rhythm: Normal rate  Abdominal:      General: There is no distension  Palpations: There is no mass  Tenderness: There is no abdominal tenderness  There is no right CVA tenderness  Musculoskeletal:         General: No swelling, tenderness, deformity or signs of injury  Right lower leg: No edema  Left lower leg: No edema  Skin:     Capillary Refill: Capillary refill takes less than 2 seconds  Coloration: Skin is pale  Skin is not jaundiced  Findings: No bruising or lesion  Neurological:      Mental Status: She is alert  Cranial Nerves: No cranial nerve deficit  Sensory: No sensory deficit        Coordination: Coordination normal       Gait: Gait normal    Psychiatric:         Mood and Affect: Mood normal  Additional Data:     Labs:  Results from last 7 days   Lab Units 10/15/21  0450 10/11/21  1059 10/11/21  0337   WBC Thousand/uL 6 20   < > 4 85   HEMOGLOBIN g/dL 8 6*  --  6 9*   HEMATOCRIT % 28 0*  --  22 0*   PLATELETS Thousands/uL 194   < > 212   BANDS PCT %  --   --  4   LYMPHO PCT %  --   --  6*   MONO PCT %  --   --  3*   EOS PCT %  --   --  0    < > = values in this interval not displayed  Results from last 7 days   Lab Units 10/15/21  0450 10/14/21  0435   SODIUM mmol/L 141 142   POTASSIUM mmol/L 4 2 3 8   CHLORIDE mmol/L 112* 114*   CO2 mmol/L 17* 19*   BUN mg/dL 86* 74*   CREATININE mg/dL 4 81* 4 56*   ANION GAP mmol/L 12 9   CALCIUM mg/dL 8 0* 7 9*   ALBUMIN g/dL  --  1 8*   TOTAL BILIRUBIN mg/dL  --  1 21*   ALK PHOS U/L  --  185*   ALT U/L  --  11*   AST U/L  --  80*   GLUCOSE RANDOM mg/dL 105 89     Results from last 7 days   Lab Units 10/13/21  1712   INR  1 60*             Results from last 7 days   Lab Units 10/13/21  1539 10/10/21  0621 10/09/21  1447   LACTIC ACID mmol/L 1 8  --  1 7   PROCALCITONIN ng/ml  --  78 78* 21 02*       Lines/Drains:  Invasive Devices     Peripherally Inserted Central Catheter Line            PICC Line 10/11/21 3 days          Line            Hemodialysis AV Fistula 09/30/21 Right Upper arm 15 days          Drain            Urostomy Ileal conduit RUQ 1836 days    Closed/Suction Drain Left Other (Comment) 1 day    Closed/Suction Drain Left Other (Comment) 10 Fr  1 day                Central Line:  Goal for removal: No longer needed  Will place order to discontinue           Telemetry:  Telemetry Orders (From admission, onward)             48 Hour Telemetry Monitoring  Continuous x 48 hours     Question:  Reason for 48 Hour Telemetry  Answer:  Arrhythmias Requiring Medical Therapy (eg  SVT, Vtach/fib, Bradycardia, Uncontrolled A-fib)                 Telemetry Reviewed: Normal Sinus Rhythm  Indication for Continued Telemetry Use: Metabolic/electrolyte disturbance with high probability of dysrhythmia             Imaging: Reviewed radiology reports from this admission including: none pertinent to this encounter  Recent Cultures (last 7 days):   Results from last 7 days   Lab Units 10/13/21  1928 10/13/21  1923 10/12/21  0156 10/11/21  1851 10/11/21  0222 10/10/21  1256 10/09/21  1447 10/09/21  1444   BLOOD CULTURE   --   --  No Growth at 72 hrs    No Growth at 72 hrs   --   --   --  Fusobacterium mortiferum* Fusobacterium mortiferum*   GRAM STAIN RESULT  1+ Polys*  2+ Gram positive cocci in pairs*  2+ Gram positive rods*  1+ Gram negative rods* No Polys or Bacteria seen  --  1+ Polys  No organisms seen  --   --  Gram negative rods* Gram negative rods*   URINE CULTURE   --   --   --   --   --  >100,000 cfu/ml Klebsiella pneumoniae*  >100,000 cfu/ml Enterobacter aerogenes*  20,000-29,000 cfu/ml Enterococcus faecalis*  <10,000 cfu/ml Enterococcus avium*  --   --    BODY FLUID CULTURE, STERILE  4+ Growth of Klebsiella-Enterobacter  group* 4+ Growth of Klebsiella-Enterobacter  group*  --  No growth  --   --   --   --    C DIFF TOXIN B BY PCR   --   --   --   --  Negative  --   --   --        Last 24 Hours Medication List:   Current Facility-Administered Medications   Medication Dose Route Frequency Provider Last Rate    acetaminophen  650 mg Oral Q6H PRN Imer Queen MD      acetaminophen  975 mg Oral Q8H [address] Imer Queen MD      aluminum-magnesium hydroxide-simethicone  30 mL Oral Q6H PRN Imer Queen MD      atorvastatin  40 mg Oral HS Imer Queen MD      calcitriol  0 25 mcg Oral Daily Imer Queen MD      cholecalciferol  800 Units Oral Daily Imer Queen MD      citalopram  20 mg Oral Daily Imer Queen MD      docusate sodium  100 mg Oral BID Imer Queen MD      levothyroxine  75 mcg Oral Daily Imer Queen MD      melatonin  3 mg Oral HS Imer Queen MD      metoprolol  2 5 mg Intravenous Q6H PRN Jaime Shafer PA-C      metoprolol tartrate  25 mg Oral BID Jaime Shafer PA-C      ondansetron  4 mg Intravenous Q6H PRN Michael Ruiz MD      piperacillin-tazobactam  2 25 g Intravenous Q6H Ju Moore MD 2 25 g (10/15/21 1712)    saccharomyces boulardii  250 mg Oral Daily iMchael Ruiz MD      senna  1 tablet Oral Daily Michael Ruiz MD      sodium bicarbonate infusion  75 mL/hr Intravenous Continuous Patsy Armstrong MD 75 mL/hr (10/15/21 1345)        Today, Patient Was Seen By: Jillian Small MD    **Please Note: This note may have been constructed using a voice recognition system  **

## 2021-10-15 NOTE — PROGRESS NOTES
NEPHROLOGY HOSPITAL PROGRESS NOTE   Luis Garvin 77 y o  female MRN: [id number]  Unit/Bed#: [facility] 929-01 Encounter: [id number]  Reason for Consult: CKD V    ASSESSMENT and PLAN:    70-year-old female with past medical history of CKD, hypertension, hyperparathyroidism, anemia, pulmonary emboli, bilateral hydronephrosis secondary to obstructive uropathy and nonfunctional bladder status post supratrigonal cystectomy and ileal conduit in October 2016, status post nephrostomy tube placement in May 2021, status post left percutaneous retrograde percutaneous nephrostomy tube placement in September 2021 who initially presents with abdominal pain on October 9th   There is concern for hematoma   Also with elective right brachial basilic fistula superficialization with transposition on September 30th      1) CKD stage 5     - baseline creatinine 3-3 5 mg/dL  -outpatient nephrologist Dr Levy   -urinalysis 20-30 RBC, 20-30 WBC  - access-AV fistula with placed into January 2021 and status post fistulogram with angioplasty earlier, also status post transposition on September 30t  -10/11-creatinine stable   3 6 mg/dL   Potassium low being repleted   -10/12-creatinine stable 3 6   Bicarb lower   Urine output diminished   Given intravenous fluids  -10/13-creatinine rising 4 2 mg/dL  Prudy Good give fluids    CT scan noted with new loculated fluid collection and concern for obstruction    Worsening renal function may be in the setting of hypotension yesterday but also will need to rule out any obstructive issues  -10/14-continues to be hypotensive but slightly improving  Creatinine rising 4 6   -10/15-creatinine rising to 4 8 mg dL  Bicarbonate 17  Starting bicarbonate fluids  Antibiotics adjusted yesterday        Plan  -continue to hold diuretics    -give intravenous fluids today with bicarbonate  -reviewed with primary team attending  -no urgent indication for dialysis as of yet the patient is approaching dialysis needs if renal function does not stabilize possibly as early as next  -spoke to the patient's son as the patient is not able to make her own decisions  Son states understanding of the situation currently but also would like the patient to be involved in her own decision making if the patient becomes more lucid  For now the son and the patient's daughter are going to monitor the situation and be a part of any goals of care discussion as the days move forward  -if signs of pulmonary edema, give Lasix  -BMP and phosphorus in a m   - avoid hypotension  - avoid nephrotoxic agents  -ostomy management per Urology team     2) sepsis- retroperitoneal hematoma-due to perinephric hematoma and now concern for sepsis     -urology on board-continued observation of left renal hematoma  -concern for possible sepsis due to initial fever and leukocytosis-ID on board-on empiric antibiotics  -antibiotics expanded to include cefepime and Flagyl per ID team due to bacteremia with fusiform bacterium  -urine culture with Klebsiella and Enterobacter  - CT scan with new loculated abdominal fluid collection    Also increase bowel dilation  -surgery team now on board along with IR team  -infectious disease team has adjusted antibiotics to Zosyn starting stent/14     3) electrolytes     -hypokalemia-repleted and improved     4) acid/base     -give IV bicarbonate fluids     5) anemia-     -transfusion per primary team     6) history of cystectomy with ileal conduit in 2016 due to bladder dysfunction and 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 and 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 with left nephrostomy tube and nephroureteral catheter     -on October 4th, nephrostomy tube was removed  -most recent CT scan concerning for hemoperitoneum, perinephric hematoma     7) hemodialysis access-vascular surgery team on board due to arm swelling     -duplex--greater than 50% stenosis in the proximal subclavian vein, and basilic vein  -IR has been consulted for evaluation   Once the patient is more stable for fistulogram      8) MBD-on calcitriol and vitamin-D     9) volume-has whole body overloaded but is lying supine   Appears comfortable        10) pl effusion - per Primary team; small L effusion     -status post thoracentesis on 10/11 with 90 cc aspirated with inability to aspirate further  -follow-up thoracentesis studies per primary team     11) polycythemia vera-per primary team    SUBJECTIVE / 24H INTERVAL HISTORY:    Blood pressures 90s to 100s  Tachycardic  Patient is weak  Appears confused  Lethargic at times  On room air  Urine output minimal     OBJECTIVE:  Current Weight: Weight - Scale: 86 5 kg (190 lb 11 2 oz)  Vitals:    10/15/21 0236 10/15/21 0325 10/15/21 0436 10/15/21 0616   BP:  117/80 96/55 94/56   Pulse: 83 (!) 116 (!) 119 (!) 114   Resp:  18     Temp:       TempSrc:       SpO2: 95% 94% 95% 93%   Weight:       Height:           Intake/Output Summary (Last 24 hours) at 10/15/2021 1030  Last data filed at 10/15/2021 0050  Gross per 24 hour   Intake 1286 25 ml   Output 325 ml   Net 961 25 ml     General: NAD  Skin: no rash  Eyes: anicteric sclera  ENT:  Dry mucous membrane  Neck: supple  Chest:  Diminished intake bases  No wheezes  No rales    CVS: s1s2, no murmur, no gallop, no rub  Abdomen: soft, positive drains present  Extremities:  Anasarca  :  Positive ostomy  Neuro: AAOX2  Psych: normal affect    Medications:    Current Facility-Administered Medications:     acetaminophen (TYLENOL) tablet 650 mg, 650 mg, Oral, Q6H PRN, Rosa Maria aGrcia MD, 650 mg at 10/11/21 1739    acetaminophen (TYLENOL) tablet 975 mg, 975 mg, Oral, Q8H [facility], Rosa Maria Garcia MD, 975 mg at 10/15/21 0541    aluminum-magnesium hydroxide-simethicone (MYLANTA) oral suspension 30 mL, 30 mL, Oral, Q6H PRN, Rosa Maria Garcia MD    atorvastatin (LIPITOR) tablet 40 mg, 40 mg, Oral, HS, Rosa Maria Garcia MD, 40 mg at 10/14/21 2151    calcitriol (ROCALTROL) capsule 0 25 mcg, 0 25 mcg, Oral, Daily, Shavon Hernandez MD, 0 25 mcg at 10/14/21 0831    cholecalciferol (VITAMIN D) oral liquid 800 Units, 800 Units, Oral, Daily, Shavon Hernandez MD, 800 Units at 10/14/21 0831    citalopram (CeleXA) tablet 20 mg, 20 mg, Oral, Daily, Shavon Hernandez MD, 20 mg at 10/14/21 0830    docusate sodium (COLACE) capsule 100 mg, 100 mg, Oral, BID, Shavon Hernandez MD, 100 mg at 10/12/21 1101    levothyroxine tablet 75 mcg, 75 mcg, Oral, Daily, Shavon Hernandez MD, 75 mcg at 10/14/21 0830    melatonin tablet 3 mg, 3 mg, Oral, HS, Shavon Hernandez MD, 3 mg at 10/14/21 2151    metoprolol (LOPRESSOR) injection 2 5 mg, 2 5 mg, Intravenous, Q6H PRN, Ivan Merlos PA-C, 2 5 mg at 10/15/21 0438    metoprolol tartrate (LOPRESSOR) tablet 25 mg, 25 mg, Oral, BID, Ivan Merlos PA-C, 25 mg at 10/15/21 0634    multi-electrolyte (PLASMALYTE-A/ISOLYTE-S PH 7 4) IV solution, 75 mL/hr, Intravenous, Continuous, Raymundo Quiroga MD, Last Rate: 75 mL/hr at 10/15/21 0051, 75 mL/hr at 10/15/21 0051    ondansetron (ZOFRAN) injection 4 mg, 4 mg, Intravenous, Q6H PRN, Shavon Hernandez MD, 4 mg at 10/14/21 2153    piperacillin-tazobactam (ZOSYN) 2 25 g in sodium chloride 0 9 % 50 mL IVPB, 2 25 g, Intravenous, Q6H, Melissa Zimmerman MD, Last Rate: 100 mL/hr at 10/15/21 0326, 2 25 g at 10/15/21 0326    saccharomyces boulardii (FLORASTOR) capsule 250 mg, 250 mg, Oral, Daily, Shavon Hernandez MD, 250 mg at 10/14/21 0830    senna (SENOKOT) tablet 8 6 mg, 1 tablet, Oral, Daily, Shavon Hernandez MD, 8 6 mg at 10/12/21 1101    sodium bicarbonate tablet 650 mg, 650 mg, Oral, BID after meals, Shavon Hernandez MD, 650 mg at 10/14/21 0830    Laboratory Results:  Results from last 7 days   Lab Units 10/15/21  0450 10/14/21  0435 10/13/21  0517 10/12/21  0145 10/11/21  1652 10/11/21  1059 10/11/21  3268 10/11/21  0028 10/10/21  0621 10/09/21  1447 WBC Thousand/uL 6 20  --  7 21 3 96*  --   --  4 85  --  4 97 6 15   HEMOGLOBIN g/dL 8 6*  --  8 3* 9 3* 9 2* 8 5* 6 9* 6 7* 7 1* 9 3*   HEMATOCRIT % 28 0*  --  26 3* 29 7* 29 0* 27 1* 22 0* 21 5* 23 2* 29 8*   PLATELETS Thousands/uL 194  --  243 244  --   --  212  --  187 276   POTASSIUM mmol/L 4 2 3 8 3 6 3 3*  --   --  3 0*  --  3 8 3 2*   CHLORIDE mmol/L 112* 114* 114* 115*  --   --  113*  --  109* 110*   CO2 mmol/L 17* 19* 18* 15*  --   --  20*  --  18* 16*   BUN mg/dL 86* 74* 65* 53*  --   --  51*  --  44* 37*   CREATININE mg/dL 4 81* 4 56* 4 15* 3 63*  --   --  3 55*  --  3 60* 3 26*   CALCIUM mg/dL 8 0* 7 9* 7 9* 8 0*  --   --  7 9*  --  8 5 9 3   MAGNESIUM mg/dL 2 5 2 1 2 4 2 3  --   --  1 7  --  1 7  --    PHOSPHORUS mg/dL 4 1 2 7 2 3 3 2  --   --  5 0*  --  4 9*  --

## 2021-10-15 NOTE — NURSING NOTE
Patient became tachycardic in the 120's  CYNTHIA Dawn of [facility] notified  Vitals obtained and WDL  EKG obtained and sent to provider  Patient continues to have no complaints and is resting comfortably in bed

## 2021-10-15 NOTE — OCCUPATIONAL THERAPY NOTE
Occupational Therapy Progress Note     Patient Name: Luis Garvin  Today's Date: 10/15/2021  Problem List  Principal Problem:    Sepsis ([facility] [address] )  Active Problems:    Chronic saddle pulmonary embolism ([facility] [address] )    Obstructive uropathy    Hypertension    Polycythemia vera ([facility] [address] )    Anemia due to stage 5 chronic kidney disease, not on chronic dialysis (HCC)    Acute renal failure superimposed on stage 5 chronic kidney disease, not on chronic dialysis [facility])    Renal hematoma, left    Pleural effusion    Localized swelling of right upper extremity          10/15/21 1038   OT Last Visit   OT Visit Date 10/15/21   Note Type   Note Type Treatment   Restrictions/Precautions   Weight Bearing Precautions Per Order No   Other Precautions Cognitive;Contact/isolation; Bed Alarm;Multiple lines; Fall Risk;Pain  (x2 JOSE ANTONIO drains )   General   Response to Previous Treatment Patient reporting fatigue but able to participate   Lifestyle   Autonomy PTA, pt reports being I with ADLs/IADLs, fnxl mobility, (+)    Reciprocal Relationships Neighbor   Service to Others Retired   Intrinsic Gratification Watching TV   Pain Assessment   Pain Assessment Tool 0-10   Pain Score No Pain   ADL   Where Assessed Supine, bed   LB Bathing Assistance 2  Maximal Assistance   LB Bathing Deficit Right upper leg;Left upper leg;Buttocks   LB Dressing Assistance 2  Maximal Assistance   LB Dressing Deficit Don/doff R sock; Don/doff L sock   Toileting Assistance  2  Maximal Assistance   Toileting Deficit Perineal hygiene  (buttocks hygiene)   Bed Mobility   Rolling R 3  Moderate assistance   Additional items Assist x 1;Bedrails; Increased time required   Rolling L 3  Moderate assistance   Additional items Assist x 1; Increased time required; Bedrails   Supine to Sit 2  Maximal assistance   Additional items Assist x 2; Increased time required;HOB elevated   Sit to Supine 2  Maximal assistance   Additional items Assist x 1;HOB elevated; Increased time required;LE management   Additional Comments Pt maintains EOB  sitting position for ~ 5 minutes with MIN A   Transfers   Sit to Stand 2  Maximal assistance   Additional items Assist x 2; Increased time required;Verbal cues   Stand to Sit 2  Maximal assistance   Additional items Assist x 2; Increased time required;Verbal cues   Additional Comments Transfers with B/L HHA, pt achieves ~ 50% stand    Cognition   Overall Cognitive Status Impaired   Arousal/Participation Cooperative   Attention Attends with cues to redirect   Orientation Level Oriented to person   Memory Decreased recall of precautions   Following Commands Follows one step commands with increased time or repetition   Comments Pt cooperative during session, cues to redirect, encouragement to participate at times    Activity Tolerance   Activity Tolerance Patient limited by fatigue;Patient limited by pain   Medical Staff Made Aware PT Martni Cluster    Assessment   Assessment Patient participated in Skilled OT session this date with interventions consisting of ADL re training with the use of correct body mechnaics, safety awareness and fall prevention techniques,  therapeutic activities to: increase activity tolerance, increase dynamic sit/ stand balance during functional activity  and increase OOB/ sitting tolerance   Upon arrival patient was found supine in bed  Pt demonstrated the following tasks: MOD A to roll, MAX A x 2 sup <> sit, MAX A x 2 ~50% STS, MIN A to maintain EOB sitting position  Pt incontinent of bowel  Requires MAX A for hygiene, LB bathing, + LBD  Patient continues to be functioning below baseline level, occupational performance remains limited secondary to factors listed above and increased risk for falls and injury  From OT standpoint, recommendation at time of d/c would be STR    Patient to benefit from continued Occupational Therapy treatment while in the hospital to address deficits as defined above and maximize level of functional independence with ADLs and functional mobility  Pt was left in bed with alarm on after session with all current needs met  Please see goals added to POC listed below  The patient's raw score on the AM-PAC Daily Activity inpatient short form is 13, standardized score is 32 03, less than 39 4  Patients at this level are likely to benefit from discharge to post-acute rehabilitation services  Please refer to the recommendation of the Occupational Therapist for safe discharge planning  Plan   Treatment Interventions ADL retraining;Functional transfer training;UE strengthening/ROM; Endurance training;Cognitive reorientation;Patient/family training;Equipment evaluation/education; Compensatory technique education;Continued evaluation; Energy conservation; Activityengagement   Goal Expiration Date 10/25/21   OT Treatment Day 2   OT Frequency 2-3x/wk   Recommendation   OT Discharge Recommendation Post acute rehabilitation services   OT - OK to Discharge Yes  (to rehab when medically stable )     GOALS    - Pt will improve functional transfers to MIN A on/off all surfaces using DME as needed w/ G balance/safety     - Pt will improve functional mobility during ADL/IADL/leisure tasks to MOD A using DME as needed w/ G balance/safety     Ruiz Cook, MS, OTR/L

## 2021-10-15 NOTE — CONSULTS
Consultation - Cardiology   Joe Benjamin 77 y o  female MRN: [id number]  Unit/Bed#: [facility] 929-01 Encounter: [id number]  10/15/21  12:20 PM    Assessment/ Plan:  1  Paroxysmal Atrial Fibrillation    -No known hx of AF  Patient presented with left flank pain and tachycardia  -patient was found to have sepsis  EKG revealed sinus tachy with PVC  -Patient have two episodes of paroxysmal AF with RVR on EKGs during this hospitalization   -AF is likely due to Sepsis and miss doses of metoprolol  -[id number] 4, Has-Bled score 3  -Patient was on Eliquis 2 5 mg po bid for unprovoked PE  -Patient has high risk for stroke and bleeding  Currently Anticoagulation on hold due to hematoma and bleeding  Need family and patient discussion on benefits and risk of regarding AC once patient is stable from Urology   -patient is currently converted back to NSR  Continue metoprolol tartrate 25 mg po bid with SBP parameter 95    -will perform Echo  -continue tele monitor    2  HTN  -Currently BP is soft in the setting of sepsis  -continue monitor BP    3  CAD?  -NM stress test (12/9/2021) reviewed moderate to severe reversible myocardial perfusion defect of the anterior wall  -patient denies chest pain  -continue atorvastatin and metoprolol    4  Sepsis  - due to infected left renal hematoma and new intraabdominal fluid collect  - Urine cx and blood cx revealed multi-organisms   -s/p IR drainage  -on Zosyn  -ID follow up    5  Hx of PE  -patient was on Eliquis 2 5 mg po bid for unprovoked DVT/PE  -Need discussion regarding AC    6  Left renal hematoma with chronic obstructive uropathy  -s/p percutaneous nephrostomy tube removal on 10/4/2021  -Anticoagulation may be contributing, currently off AC  -High risk for stroke and bleeding  -Urology on board    7  LUANN on CKD stage 5  -baseline Cr 3 - 3 5  -Currently Cr worsen to 4 81  - Nephrology on board    8  Anemia    -recent Hb 8 6  -Continue to monitor Hb    History of Present Illness Physician Requesting Consult: Griffin Beckford MD    Reason for Consult / Principal Problem: New onset Afib    HPI: Haresh De Paz is a 77y o  year old female with a past medical history of DVT/PE, hypertension, CKD stage 5, chronic obstructive uropathy status post ileal conduit stoma who presents with left flank pain and fever after left percutaneous nephrostomy tube removal on 10/04/2021  CT abdomen revealed left renal hematoma  Repeat CT scan revealed new perirenal fluid collections  Patient was found to be sepsis with multiple organisms grow in the urine and blood  On presentation, patient was tachycardia, EKG revealed sinus tachycardia with occasional PVC  Troponin 0 09, BNP 9655  During his hospitalization, patient was found to have new onset atrial fibrillation with RVR on 10/12 and 10/15  Patient was on Eliquis 2 5 mg p o  B i d  Currently anticoagulation was hold  Patient has high chest score and has bleeding score  Atrial fibrillation is likely in the setting of sepsis  Patient is currently converted back to sinus rhythm  On my evaluation, patient is awake and alert, patient appears very lethargic  Patient denies chest pain  Review of Systems   Constitutional: Positive for fatigue  Negative for chills and fever  HENT: Negative for congestion, rhinorrhea, sneezing and sore throat  Eyes: Negative for pain and discharge  Respiratory: Negative for cough, chest tightness, shortness of breath and wheezing  Cardiovascular: Negative for chest pain and leg swelling  Gastrointestinal: Negative for abdominal pain, nausea and vomiting  Endocrine: Negative for polydipsia, polyphagia and polyuria  Genitourinary: Negative for flank pain, frequency and urgency  Musculoskeletal: Negative for arthralgias, back pain and joint swelling  Skin: Negative for color change and pallor  Neurological: Positive for weakness (generalized)   Negative for dizziness, light-headedness and headaches  Psychiatric/Behavioral: Negative for agitation and confusion         Historical Information   Past Medical History:   Diagnosis Date    Anxiety     Bowel obstruction (HCC)     Chronic kidney disease     Chronic kidney disease (CKD), stage IV (severe) (HCC)     stage IV    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    Circulation problem     Compression fracture of cervical spine ([facility] [address] )     COVID-19 07/2021    hospitalized     Hernia of abdominal cavity     History of kidney problems     History of transfusion     Hydronephrosis     Hypertension     IBS (irritable bowel syndrome)     Incontinence     Lung mass     Improving on PET/CT 1/2016    Polycythemia vera ([facility] [address] )     Pulmonary embolism ([facility] [address] ) 2014    Shingles     Urinary tract infection      Past Surgical History:   Procedure Laterality Date    ABDOMINAL ADHESION SURGERY N/A 8/9/2020    Procedure: LYSIS ADHESIONS;  Surgeon: Yudy Her DO;  [facility];  Service: General    BLADDER SUSPENSION      BOTOX INJECTION N/A 7/27/2016    Procedure: BOTOX INJECTION ;  Surgeon: Felicitas Wheeler MD;  [facility];  Service:     CHOLECYSTECTOMY N/A     COLONOSCOPY      CYSTECTOMY, RADICAL WITH ILEOCONDUIT N/A 10/4/2016    Procedure: Druscilla Runner ;  Surgeon: Felicitas Wheeler MD;  [facility];  Service:    Lorayne Low W/ RETROGRADES Bilateral 7/27/2016    Procedure: CYSTOSCOPY; RETROGRADE PYELOGRAM ;  Surgeon: Felicitas Wheeler MD;  [facility];  Service:     HERNIA REPAIR      IR AV FISTULAGRAM/GRAFTOGRAM  2/10/2021    IR DRAINAGE TUBE PLACEMENT  10/13/2021    IR NEPHROSTOMY TO NEPHROURETERAL STENT  5/15/2021    IR NEPHROSTOMY TO NEPHROURETERAL STENT  6/9/2021    IR NEPHROSTOMY TUBE CHECK AND/OR REMOVAL  6/16/2021    IR NEPHROSTOMY TUBE CHECK/CHANGE/REPOSITION/REINSERTION/UPSIZE  5/14/2021    IR NEPHROSTOMY TUBE CHECK/CHANGE/REPOSITION/REINSERTION/UPSIZE 5/21/2021    IR NEPHROSTOMY TUBE CHECK/CHANGE/REPOSITION/REINSERTION/UPSIZE  9/16/2021    IR NEPHROSTOMY TUBE CHECK/CHANGE/REPOSITION/REINSERTION/UPSIZE  10/4/2021    IR NEPHROSTOMY TUBE PLACEMENT  5/10/2021    IR NEPHROSTOMY TUBE PLACEMENT  9/20/2021    IR NON-TUNNELED CENTRAL LINE PLACEMENT  7/17/2020    IR NON-TUNNELED CENTRAL LINE PLACEMENT  8/14/2020    IR NON-TUNNELED CENTRAL LINE PLACEMENT  7/16/2021    IR NON-TUNNELED CENTRAL LINE PLACEMENT  10/11/2021    IR THORACENTESIS  10/11/2021    LAPAROTOMY N/A 8/9/2020    Procedure: LAPAROTOMY EXPLORATORY, PARASTOMAL HERNIA REPAIR WITH MESH;  Surgeon: Jeffrey Grant DO;  [facility];  Service: General    WI ANASTOMOSIS,AV,ANY SITE Right 1/5/2021    Procedure: Creation of right brachiobasilic fistula; Surgeon: Gail Chairez MD;  [facility];  Service: Vascular    WI COLONOSCOPY FLX DX W/COLLJ SPEC WHEN PFRMD N/A 8/31/2016    Procedure: COLONOSCOPY;  Surgeon: Eli Crenshaw MD;  Location: [facility]; Service: Gastroenterology    WI CYSTOSCOPY,INSERT URETERAL STENT Bilateral 7/27/2016    Procedure: STENT INSERTION; EXCISION OF MESH ;  Surgeon: Husam Kruse MD;  [facility];  Service: Urology    WI REVISE AV FISTULA,W/O THROMBECTOMY Right 9/30/2021    Procedure: Superficialization and transposition of right brachiobasilic fistula;   Surgeon: Ad Chairez MD;  [facility];  Service: Vascular    TONSILLECTOMY      TUBAL LIGATION      WISDOM TOOTH EXTRACTION       Social History     Substance and Sexual Activity   Alcohol Use Not Currently    Comment: n/s     Social History     Substance and Sexual Activity   Drug Use Not Currently    Comment: n/a     Social History     Tobacco Use   Smoking Status Never Smoker   Smokeless Tobacco Never Used   Tobacco Comment    n/a       Family History:   Family History   Problem Relation Age of Onset    Cancer Mother         small cell cancer     Heart disease Father    Tori Roldan COPD Father     Heart disease Brother     Nephrolithiasis Brother        Meds/Allergies   all current active meds have been reviewed  Allergies   Allergen Reactions    Chlorhexidine Rash     petichi like rash when using chlorhexidine swabs prior to IV  Objective   Vitals: Blood pressure 94/56, pulse 76, temperature (!) 96 6 °F (35 9 °C), resp  rate 18, height 5' (1 524 m), weight 86 5 kg (190 lb 11 2 oz), SpO2 95 %, not currently breastfeeding , Body mass index is 37 24 kg/m² ,   Orthostatic Blood Pressures      Most Recent Value   Blood Pressure  94/56 filed at 10/15/2021 0234          Systolic (98KRC), RUU:696 , Min:94 , MINO:238     Diastolic (70JYI), XEE:51, Min:55, Max:80        Intake/Output Summary (Last 24 hours) at 10/15/2021 1220  Last data filed at 10/15/2021 0050  Gross per 24 hour   Intake 1286 25 ml   Output 225 ml   Net 1061 25 ml       Invasive Devices     Peripherally Inserted Central Catheter Line            PICC Line 10/11/21 3 days          Line            Hemodialysis AV Fistula 09/30/21 Right Upper arm 15 days          Drain            Urostomy Ileal conduit RUQ 1836 days    Closed/Suction Drain Left Other (Comment) 1 day    Closed/Suction Drain Left Other (Comment) 10 Fr  1 day                    Physical Exam:  GEN: Alert and appears lethargy and oriented  Well appearing and well nourished  Patient is obesity  HEENT: Sclera anicteric, conjunctivae pink, mucous membranes moist  Oropharynx clear  NECK: Supple, no carotid bruits, no significant JVD  Trachea midline, no thyromegaly  HEART: Regular rhythm, normal S1 and S2, no murmurs, clicks, gallops or rubs  PMI nondisplaced, no thrills  LUNGS: Clear to auscultation bilaterally; no wheezes, rales, or rhonchi  No increased work of breathing or signs of respiratory distress  ABDOMEN: Soft, nontender, nondistended, normoactive bowel sounds  EXTREMITIES: Right upper extremity swelling and leg swelling   Skin warm and well perfused, no clubbing, cyanosis  NEURO: No focal findings  Normal speech  Mood and affect normal    SKIN: Normal without suspicious lesions on exposed skin        Lab Results:     Troponins:   Results from last 7 days   Lab Units 10/09/21  1447   TROPONIN I ng/mL 0 09*       CBC with diff:   Results from last 7 days   Lab Units 10/15/21  0450 10/13/21  0517 10/12/21  0145 10/11/21  1652 10/11/21  1059 10/11/21  0337 10/11/21  0028 10/10/21  0621 10/09/21  1447   WBC Thousand/uL 6 20 7 21 3 96*  --   --  4 85  --  4 97 6 15   HEMOGLOBIN g/dL 8 6* 8 3* 9 3* 9 2* 8 5* 6 9* 6 7* 7 1* 9 3*   HEMATOCRIT % 28 0* 26 3* 29 7* 29 0* 27 1* 22 0* 21 5* 23 2* 29 8*   MCV fL 93 91 92  --   --  92  --  93 93   PLATELETS Thousands/uL 194 243 244  --   --  212  --  187 276   MCH pg 28 5 28 7 28 9  --   --  28 9  --  28 4 29 0   MCHC g/dL 30 7* 31 6 31 3*  --   --  31 4  --  30 6* 31 2*   RDW % 18 6* 17 5* 17 1*  --   --  16 9*  --  16 7* 16 4*   MPV fL 10 9 10 7 10 6  --   --  10 2  --  10 4 10 1   NRBC AUTO /100 WBCs  --   --   --   --   --   --   --  0  --          CMP:   Results from last 7 days   Lab Units 10/15/21  0450 10/14/21  0435 10/13/21  0517 10/12/21  0145 10/11/21  0337 10/10/21  0621 10/09/21  1447   POTASSIUM mmol/L 4 2 3 8 3 6 3 3* 3 0* 3 8 3 2*   CHLORIDE mmol/L 112* 114* 114* 115* 113* 109* 110*   CO2 mmol/L 17* 19* 18* 15* 20* 18* 16*   BUN mg/dL 86* 74* 65* 53* 51* 44* 37*   CREATININE mg/dL 4 81* 4 56* 4 15* 3 63* 3 55* 3 60* 3 26*   CALCIUM mg/dL 8 0* 7 9* 7 9* 8 0* 7 9* 8 5 9 3   AST U/L  --  80* 90*  --   --  72* 63*   ALT U/L  --  11* 15  --   --  15 11*   ALK PHOS U/L  --  185* 244*  --   --  81 128*   EGFR ml/min/1 73sq m 8 9 10 12 12 12 13

## 2021-10-15 NOTE — ASSESSMENT & PLAN NOTE
Anemia 2/2 CKD with component of acute blood loss anemia  Transfused  Yesterday  8 6 in the setting of IVF  Will continue to follow

## 2021-10-16 LAB
ALBUMIN SERPL BCP-MCNC: 1.8 G/DL (ref 3.5–5)
ALP SERPL-CCNC: 249 U/L (ref 46–116)
ALT SERPL W P-5'-P-CCNC: 10 U/L (ref 12–78)
ANION GAP SERPL CALCULATED.3IONS-SCNC: 15 MMOL/L (ref 4–13)
ANISOCYTOSIS BLD QL SMEAR: PRESENT
ARTIFACT: PRESENT
AST SERPL W P-5'-P-CCNC: 52 U/L (ref 5–45)
BACTERIA SPEC BFLD CULT: ABNORMAL
BASOPHILS # BLD MANUAL: 0 THOUSAND/UL (ref 0–0.1)
BASOPHILS NFR MAR MANUAL: 0 % (ref 0–1)
BILIRUB SERPL-MCNC: 0.74 MG/DL (ref 0.2–1)
BUN SERPL-MCNC: 99 MG/DL (ref 5–25)
CALCIUM ALBUM COR SERPL-MCNC: 10.1 MG/DL (ref 8.3–10.1)
CALCIUM SERPL-MCNC: 8.3 MG/DL (ref 8.3–10.1)
CHLORIDE SERPL-SCNC: 107 MMOL/L (ref 100–108)
CO2 SERPL-SCNC: 18 MMOL/L (ref 21–32)
CREAT SERPL-MCNC: 4.99 MG/DL (ref 0.6–1.3)
EOSINOPHIL # BLD MANUAL: 0.1 THOUSAND/UL (ref 0–0.4)
EOSINOPHIL NFR BLD MANUAL: 2 % (ref 0–6)
ERYTHROCYTE [DISTWIDTH] IN BLOOD BY AUTOMATED COUNT: 19 % (ref 11.6–15.1)
GFR SERPL CREATININE-BSD FRML MDRD: 8 ML/MIN/1.73SQ M
GLUCOSE SERPL-MCNC: 99 MG/DL (ref 65–140)
GRAM STN SPEC: ABNORMAL
HCT VFR BLD AUTO: 25.7 % (ref 34.8–46.1)
HELMET CELLS BLD QL SMEAR: PRESENT
HGB BLD-MCNC: 7.7 G/DL (ref 11.5–15.4)
LACTATE SERPL-SCNC: 1.1 MMOL/L (ref 0.5–2)
LYMPHOCYTES # BLD AUTO: 0.35 THOUSAND/UL (ref 0.6–4.47)
LYMPHOCYTES # BLD AUTO: 7 % (ref 14–44)
MAGNESIUM SERPL-MCNC: 2.3 MG/DL (ref 1.6–2.6)
MCH RBC QN AUTO: 28.5 PG (ref 26.8–34.3)
MCHC RBC AUTO-ENTMCNC: 30 G/DL (ref 31.4–37.4)
MCV RBC AUTO: 95 FL (ref 82–98)
MONOCYTES # BLD AUTO: 0.2 THOUSAND/UL (ref 0–1.22)
MONOCYTES NFR BLD: 4 % (ref 4–12)
NEUTROPHILS # BLD MANUAL: 4.29 THOUSAND/UL (ref 1.85–7.62)
NEUTS BAND NFR BLD MANUAL: 1 % (ref 0–8)
NEUTS SEG NFR BLD AUTO: 86 % (ref 43–75)
NRBC BLD AUTO-RTO: 1 /100 WBC (ref 0–2)
OVALOCYTES BLD QL SMEAR: PRESENT
PHOSPHATE SERPL-MCNC: 5.8 MG/DL (ref 2.3–4.1)
PLATELET # BLD AUTO: 148 THOUSANDS/UL (ref 149–390)
PLATELET BLD QL SMEAR: ABNORMAL
PMV BLD AUTO: 11 FL (ref 8.9–12.7)
POIKILOCYTOSIS BLD QL SMEAR: PRESENT
POLYCHROMASIA BLD QL SMEAR: PRESENT
POTASSIUM SERPL-SCNC: 4.1 MMOL/L (ref 3.5–5.3)
PROT SERPL-MCNC: 4.9 G/DL (ref 6.4–8.2)
RBC # BLD AUTO: 2.7 MILLION/UL (ref 3.81–5.12)
RBC MORPH BLD: PRESENT
SCHISTOCYTES BLD QL SMEAR: PRESENT
SODIUM SERPL-SCNC: 140 MMOL/L (ref 136–145)
TOXIC GRANULES BLD QL SMEAR: PRESENT
WBC # BLD AUTO: 4.93 THOUSAND/UL (ref 4.31–10.16)

## 2021-10-16 PROCEDURE — 83735 ASSAY OF MAGNESIUM: CPT | Performed by: PHYSICIAN ASSISTANT

## 2021-10-16 PROCEDURE — 83010 ASSAY OF HAPTOGLOBIN QUANT: CPT | Performed by: PHYSICIAN ASSISTANT

## 2021-10-16 PROCEDURE — 85007 BL SMEAR W/DIFF WBC COUNT: CPT | Performed by: PHYSICIAN ASSISTANT

## 2021-10-16 PROCEDURE — 99232 SBSQ HOSP IP/OBS MODERATE 35: CPT | Performed by: INTERNAL MEDICINE

## 2021-10-16 PROCEDURE — 99232 SBSQ HOSP IP/OBS MODERATE 35: CPT | Performed by: PHYSICIAN ASSISTANT

## 2021-10-16 PROCEDURE — 85027 COMPLETE CBC AUTOMATED: CPT | Performed by: PHYSICIAN ASSISTANT

## 2021-10-16 PROCEDURE — 83605 ASSAY OF LACTIC ACID: CPT | Performed by: PHYSICIAN ASSISTANT

## 2021-10-16 PROCEDURE — 84100 ASSAY OF PHOSPHORUS: CPT | Performed by: PHYSICIAN ASSISTANT

## 2021-10-16 PROCEDURE — 80053 COMPREHEN METABOLIC PANEL: CPT | Performed by: PHYSICIAN ASSISTANT

## 2021-10-16 RX ORDER — SODIUM CHLORIDE, SODIUM GLUCONATE, SODIUM ACETATE, POTASSIUM CHLORIDE, MAGNESIUM CHLORIDE, SODIUM PHOSPHATE, DIBASIC, AND POTASSIUM PHOSPHATE .53; .5; .37; .037; .03; .012; .00082 G/100ML; G/100ML; G/100ML; G/100ML; G/100ML; G/100ML; G/100ML
500 INJECTION, SOLUTION INTRAVENOUS ONCE
Status: COMPLETED | OUTPATIENT
Start: 2021-10-16 | End: 2021-10-16

## 2021-10-16 RX ORDER — ALBUMIN, HUMAN INJ 5% 5 %
12.5 SOLUTION INTRAVENOUS ONCE
Status: COMPLETED | OUTPATIENT
Start: 2021-10-16 | End: 2021-10-16

## 2021-10-16 RX ADMIN — METOPROLOL TARTRATE 25 MG: 25 TABLET, FILM COATED ORAL at 17:19

## 2021-10-16 RX ADMIN — CITALOPRAM HYDROBROMIDE 20 MG: 20 TABLET ORAL at 11:01

## 2021-10-16 RX ADMIN — ALBUMIN (HUMAN) 12.5 G: 12.5 INJECTION, SOLUTION INTRAVENOUS at 03:31

## 2021-10-16 RX ADMIN — CALCITRIOL 0.25 MCG: 0.25 CAPSULE, LIQUID FILLED ORAL at 11:00

## 2021-10-16 RX ADMIN — SODIUM CHLORIDE, SODIUM GLUCONATE, SODIUM ACETATE, POTASSIUM CHLORIDE, MAGNESIUM CHLORIDE, SODIUM PHOSPHATE, DIBASIC, AND POTASSIUM PHOSPHATE 500 ML: .53; .5; .37; .037; .03; .012; .00082 INJECTION, SOLUTION INTRAVENOUS at 04:27

## 2021-10-16 RX ADMIN — PIPERACILLIN AND TAZOBACTAM 2.25 G: 36; 4.5 INJECTION, POWDER, FOR SOLUTION INTRAVENOUS at 05:41

## 2021-10-16 RX ADMIN — ACETAMINOPHEN 975 MG: 325 TABLET, FILM COATED ORAL at 05:41

## 2021-10-16 RX ADMIN — ACETAMINOPHEN 975 MG: 325 TABLET, FILM COATED ORAL at 21:40

## 2021-10-16 RX ADMIN — PIPERACILLIN AND TAZOBACTAM 2.25 G: 36; 4.5 INJECTION, POWDER, FOR SOLUTION INTRAVENOUS at 17:19

## 2021-10-16 RX ADMIN — LEVOTHYROXINE SODIUM 75 MCG: 75 TABLET ORAL at 11:01

## 2021-10-16 RX ADMIN — SODIUM BICARBONATE 75 ML/HR: 84 INJECTION, SOLUTION INTRAVENOUS at 05:46

## 2021-10-16 RX ADMIN — PIPERACILLIN AND TAZOBACTAM 2.25 G: 36; 4.5 INJECTION, POWDER, FOR SOLUTION INTRAVENOUS at 11:10

## 2021-10-16 RX ADMIN — Medication 250 MG: at 11:01

## 2021-10-16 RX ADMIN — ACETAMINOPHEN 975 MG: 325 TABLET, FILM COATED ORAL at 13:48

## 2021-10-16 RX ADMIN — METOPROLOL TARTRATE 25 MG: 25 TABLET, FILM COATED ORAL at 11:01

## 2021-10-16 RX ADMIN — ATORVASTATIN CALCIUM 40 MG: 40 TABLET, FILM COATED ORAL at 21:40

## 2021-10-16 RX ADMIN — MELATONIN TAB 3 MG 3 MG: 3 TAB at 21:40

## 2021-10-16 NOTE — ASSESSMENT & PLAN NOTE
Lab Results   Component Value Date    EGFR 8 10/16/2021    EGFR 8 10/15/2021    EGFR 9 10/14/2021    CREATININE 4 99 (H) 10/16/2021    CREATININE 4 81 (H) 10/15/2021    CREATININE 4 56 (H) 10/14/2021   worsening RF  In the setting of sepsis  Discussed with son regarding poor prognosis  Nephrology following  No plan for urgent dialysis at this time, although could consider if renal function continues to decline  Patient is still currently agreeable for HD if needed

## 2021-10-16 NOTE — PROGRESS NOTES
Cardiology Progress Note - Jann Garcia 77 y o  female MRN: [id number]    Unit/Bed#: [facility] 929-01 Encounter: [id number]        Assessment/Recommendations:    Principal Problem:    Sepsis ([facility] [address] )  Active Problems:    Chronic saddle pulmonary embolism ([facility] [address] )    Obstructive uropathy    Hypertension    Polycythemia vera ([facility] [address] )    Anemia due to stage 5 chronic kidney disease, not on chronic dialysis ([facility] [address] )    Acute renal failure superimposed on stage 5 chronic kidney disease, not on chronic dialysis (HCC)    Renal hematoma, left    Pleural effusion    Localized swelling of right upper extremity    Paroxysmal atrial fibrillation in the setting of sepsis:  Recurrent AFib noted early this morning with elevated ventricular rate  This was transient  Blood pressure was low as well  She received IV fluids and went back in sinus rhythm with rate in 80s now  Associated hypotension documented  No anticoagulation for now due to active bleeding issues  [id number] 4, Has-Bled score 3  Echo showed preserved LV systolic function with increased LV wall thickness  Mild MR noted  PA pressure 54  Continue low-dose metoprolol for now  Plan discussed with the patient    Left renal hematoma:  Conservative management  Stage 5 CKD with anemia of chronic disease      Subjective:     Overnight events reviewed  No palpitations at present  No chest pain reported  No fever or chills  Telemetry Review:  Atrial fib with RVR, noted about 3:15 a m  Now back in sinus rhythm  Cardiac testing:     Left Ventricle: Left ventricular cavity size is normal  Systolic function is normal  Wall motion is normal  Diastolic function is normal  Wall thickness is mild to moderately increased  There is concentric remodeling    Right Ventricle: Right ventricular cavity size is upper normal  Systolic function is normal     Left Atrium: The atrium is mildly dilated    Aortic Valve: There is mild regurgitation    Mitral Valve:  There is mild annular calcification    Tricuspid Valve: There is mild regurgitation    Pulmonary Artery: The estimated pulmonary artery systolic pressure is 91 7 mmHg  The pulmonary artery systolic pressure is moderately increased      Current Facility-Administered Medications:     acetaminophen (TYLENOL) tablet 650 mg, 650 mg, Oral, Q6H PRN, Abeba Chavarria MD, 650 mg at 10/11/21 1739    acetaminophen (TYLENOL) tablet 975 mg, 975 mg, Oral, Q8H [address], Abeba Chavarria MD, 975 mg at 10/16/21 0541    aluminum-magnesium hydroxide-simethicone (MYLANTA) oral suspension 30 mL, 30 mL, Oral, Q6H PRN, Abeba Chavarria MD    atorvastatin (LIPITOR) tablet 40 mg, 40 mg, Oral, HS, Abeba Chavarria MD, 40 mg at 10/15/21 2243    calcitriol (ROCALTROL) capsule 0 25 mcg, 0 25 mcg, Oral, Daily, Abeba Chavarria MD, 0 25 mcg at 10/16/21 1100    cholecalciferol (VITAMIN D) oral liquid 800 Units, 800 Units, Oral, Daily, Abeba Chavarria MD, 800 Units at 10/16/21 1101    citalopram (CeleXA) tablet 20 mg, 20 mg, Oral, Daily, Abeba Chavarria MD, 20 mg at 10/16/21 1101    docusate sodium (COLACE) capsule 100 mg, 100 mg, Oral, BID, Abeba Chavarria MD, 100 mg at 10/12/21 1101    levothyroxine tablet 75 mcg, 75 mcg, Oral, Daily, Abeba Chavarria MD, 75 mcg at 10/16/21 1101    melatonin tablet 3 mg, 3 mg, Oral, HS, Abeba Chavarria MD, 3 mg at 10/15/21 2243    metoprolol (LOPRESSOR) injection 2 5 mg, 2 5 mg, Intravenous, Q6H PRN, Nomi Banerjee PA-C, 2 5 mg at 10/15/21 0438    metoprolol tartrate (LOPRESSOR) tablet 25 mg, 25 mg, Oral, BID, Isela Funes PA-C, 25 mg at 10/16/21 1101    ondansetron (ZOFRAN) injection 4 mg, 4 mg, Intravenous, Q6H PRN, Abeba Chavarria MD, 4 mg at 10/14/21 2153    piperacillin-tazobactam (ZOSYN) 2 25 g in sodium chloride 0 9 % 50 mL IVPB, 2 25 g, Intravenous, Q6H, Niall Guzman MD, Last Rate: 100 mL/hr at 10/16/21 1110, 2 25 g at 10/16/21 1110    saccharomyces boulardii (FLORASTOR) capsule 250 mg, 250 mg, Oral, Daily, Te Tejada MD, 250 mg at 10/16/21 1101    senna (SENOKOT) tablet 8 6 mg, 1 tablet, Oral, Daily, Te Tejada MD, 8 6 mg at 10/12/21 1101    sodium bicarbonate 75 mEq in sodium chloride 0 45 % 1,000 mL infusion, 75 mL/hr, Intravenous, Continuous, Fabienne Homans, MD, Last Rate: 75 mL/hr at 10/16/21 0546, 75 mL/hr at 10/16/21 0546     Objective:     Vitals:   Blood pressure 106/58, pulse 85, temperature (!) 96 8 °F (36 °C), resp  rate 16, height 5' (1 524 m), weight 86 2 kg (190 lb), SpO2 93 %, not currently breastfeeding  Body mass index is 37 11 kg/m²  Orthostatic Blood Pressures      Most Recent Value   Blood Pressure  106/58 filed at 10/16/2021 7330         Systolic (77QFO), HBE:65 , Min:79 , KXP:574     Diastolic (37FQE), LSR:51, Min:47, Max:64      Intake/Output Summary (Last 24 hours) at 10/16/2021 1142  Last data filed at 10/15/2021 2211  Gross per 24 hour   Intake 180 ml   Output 275 ml   Net -95 ml     Weight (last 2 days)     Date/Time   Weight    10/15/21 1245   86 2 (190)              Physical Exam:    GEN: No acute distress     EYES:  Mild conjunctival pallor  HEART: regular rhythm, normal S1 and S2, 2/6 SM  LUNGS:  Decreased air entry in bases    Labs & Results:    Results from last 7 days   Lab Units 10/09/21  1447   TROPONIN I ng/mL 0 09*     Results from last 7 days   Lab Units 10/16/21  0419 10/15/21  0450 10/13/21  0517   WBC Thousand/uL 4 93 6 20 7 21   HEMOGLOBIN g/dL 7 7* 8 6* 8 3*   HEMATOCRIT % 25 7* 28 0* 26 3*   PLATELETS Thousands/uL 148* 194 243         Results from last 7 days   Lab Units 10/16/21  0419 10/15/21  0450 10/14/21  0435 10/13/21  0517   POTASSIUM mmol/L 4 1 4 2 3 8 3 6   CHLORIDE mmol/L 107 112* 114* 114*   CO2 mmol/L 18* 17* 19* 18*   BUN mg/dL 99* 86* 74* 65*   CREATININE mg/dL 4 99* 4 81* 4 56* 4 15*   CALCIUM mg/dL 8 3 8 0* 7 9* 7 9*   ALK PHOS U/L 249*  --  185* 244*   ALT U/L 10*  --  11* 15   AST U/L 52*  --  80* 90* Results from last 7 days   Lab Units 10/13/21  1712 10/11/21  0337 10/09/21  1447   INR  1 60* 2 00* 1 63*   PTT seconds 45* 46* 35     Results from last 7 days   Lab Units 10/16/21  0419 10/15/21  0450 10/14/21  0435   MAGNESIUM mg/dL 2 3 2 5 2 1     No results for input(s): NTBNP in the last 72 hours  Available cardiology studies, imaging and lab results independently reviewed

## 2021-10-16 NOTE — NURSING NOTE
Patient's HR found to be in 120s and appeared to be atrial fibrillation on tele monitor even though previously sinus rhythm  CYNTHIA Macias of [facility] notified  Pt BP 79/54  Albumin ordered and given  New BP 82/52 manually  [facility] notified  Patient was ordered labs and a 500ml bolus of isolyte  Isolyte was given and labs were obtained  Patient remained in the high 86I systolic but HR went down to 87 and showed normal sinus on tele

## 2021-10-16 NOTE — ASSESSMENT & PLAN NOTE
Left renal hematoma:   Presented with left renal hematoma  Pigtail catheter is noted medial to kidney  Renal pelvis may be collapsed  Hemorrhage and thickening extending in the combined interfascial place of retrospective as well as some high density fluid within  S/p IR drainage 10/12/21 with JOSE ANTONIO drain placement x 2  Output appears to be 25mL from both drains over 24 hour period  General surgery / urology do not plan for surgical intervention  Will need to further discuss when drain removal appropriate

## 2021-10-16 NOTE — ASSESSMENT & PLAN NOTE
Continue with metoprolol 25 mg bid  Continue cautiously with hold parameters in place if needed for low BP

## 2021-10-16 NOTE — ASSESSMENT & PLAN NOTE
Chronic saddle pulmonary embolus  Eliquis 2 5 mg bid at home, currently on hold for now in the setting of anemia, perinephric fluid collection / hematoma  Will need to discuss with specialists regarding timeline to safely restart eliquis

## 2021-10-16 NOTE — ASSESSMENT & PLAN NOTE
Mild to moderate pleural effusion on CXR  S/p thoracentesis by IR 10/11/21  Consider follow up Xray for signs of respiratory distress / acute respiratory failure

## 2021-10-16 NOTE — PLAN OF CARE
Attempted to reposition pt during shift- pt refused multiple times  JOSE ANTONIO drains flushed w/o issue during shift  Problem: MOBILITY - ADULT  Goal: Maintain or return to baseline ADL function  Description: INTERVENTIONS:  -  Assess patient's ability to carry out ADLs; assess patient's baseline for ADL function and identify physical deficits which impact ability to perform ADLs (bathing, care of mouth/teeth, toileting, grooming, dressing, etc )  - Assess/evaluate cause of self-care deficits   - Assess range of motion  - Assess patient's mobility; develop plan if impaired  - Assess patient's need for assistive devices and provide as appropriate  - Encourage maximum independence but intervene and supervise when necessary  - Involve family in performance of ADLs  - Assess for home care needs following discharge   - Consider OT consult to assist with ADL evaluation and planning for discharge  - Provide patient education as appropriate  Outcome: Progressing  Goal: Maintains/Returns to pre admission functional level  Description: INTERVENTIONS:  - Perform BMAT or MOVE assessment daily    - Set and communicate daily mobility goal to care team and patient/family/caregiver     - Collaborate with rehabilitation services on mobility goals if consulted  - Record patient progress and toleration of activity level   Outcome: Progressing

## 2021-10-16 NOTE — PROGRESS NOTES
Pastoral Care Progress Note    10/15/2021  Patient: Carroll Padilla :   Admission Date & Time: 10/9/2021 1419  MRN: [id number] [facility]: [id number]       10/15/21 0230   Clinical Encounter Type   Visited With Patient   Sacramental Encounters   Sacrament of Sick-Anointing Anointed   Sacrament Other Other (Comment)  (Fr Christen Lowery)

## 2021-10-16 NOTE — ASSESSMENT & PLAN NOTE
Anemia 2/2 CKD with component of acute blood loss anemia  Hgb noted to be 10/11/21 Transfused 1  unit PRBCs on 10/12/21  Hgb initially improved to the 9 range, now decreasing to 7 7 this morning  Will continue to follow closely, consider additional transfusion for hemoglobin less than 7

## 2021-10-16 NOTE — ASSESSMENT & PLAN NOTE
Obstructive nephropathy  Chronic bilateral hydronephrosis stents were recently removed  Urology following

## 2021-10-16 NOTE — PROGRESS NOTES
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 and ileal conduit in October 2016 status post nephrostomy tube in May 2021 and percutaneous retrograde nephrostomy tube in 9/2/2021    1  Stage 5 chronic kidney disease  o Baseline creatinine is 3-3 5 and now this is progressively getting worse  o Diuretics are on hold  o No urgent indication for dialysis  o Pretty lethargic on exam  o If any signs of pulmonary edema will give IV Lasix  o Over given multiple comorbidities dialysis may cause more harm than benefit    2  Sepsis-with a retroperitoneal hematoma  o Urology on board for observation  o Empiric antibiotics  o Id following    3  Hypokalemia  o These were repleted and improved    4  Metabolic acidosis  o Currently on a hypotonic bicarb drip  o Continue decrease rate to 50 cc an hour    5  Pleural effusion  o Status post thoracentesis October 11th    6   Polycythemia vera  o Per primary team    SUBJECTIVE:    Patient appears confused lethargic and not answering many questions    OBJECTIVE:  Current Weight: Weight - Scale: 86 2 kg (190 lb)  @  Vitals:    10/16/21 0411 10/16/21 0541 10/16/21 0944 10/16/21 1554   BP: (!) 82/52 (!) 89/47 106/58 97/52   BP Location: Left arm      Pulse:  88 85 79   Resp:    20   Temp:   (!) 96 8 °F (36 °C) 97 8 °F (36 6 °C)   TempSrc:       SpO2:  93% 93% 93%   Weight:       Height:           Intake/Output Summary (Last 24 hours) at 10/16/2021 1613  Last data filed at 10/16/2021 1554  Gross per 24 hour   Intake 420 ml   Output 290 ml   Net 130 ml     Weight (last 2 days)     Date/Time   Weight    10/15/21 1245   86 2 (190)              General: conscious, cooperative, in no acute distress  Eyes: conjunctivae pink, anicteric sclerae  ENT: lips and mucous membranes moist  Neck: supple, no JVD  Chest:  Decreased breath sounds  CVS: normal heart rate, no friction rub  Abdomen: soft, non-tender, non-distended, normoactive bowel sounds  Extremities:  Anasarca  Skin: no rash  Neuro: awake, alert, oriented  Ostomy    Medications:    Current Facility-Administered Medications:     acetaminophen (TYLENOL) tablet 650 mg, 650 mg, Oral, Q6H PRN, Elizabeth Kwok MD, 650 mg at 10/11/21 1739    acetaminophen (TYLENOL) tablet 975 mg, 975 mg, Oral, Q8H [facility], Elizabeth Kwok MD, 975 mg at 10/16/21 1348    aluminum-magnesium hydroxide-simethicone (MYLANTA) oral suspension 30 mL, 30 mL, Oral, Q6H PRN, Elizabeth Kwok MD    atorvastatin (LIPITOR) tablet 40 mg, 40 mg, Oral, HS, Elizabeth Kwok MD, 40 mg at 10/15/21 2243    calcitriol (ROCALTROL) capsule 0 25 mcg, 0 25 mcg, Oral, Daily, Elizabeth Kwok MD, 0 25 mcg at 10/16/21 1100    cholecalciferol (VITAMIN D) oral liquid 800 Units, 800 Units, Oral, Daily, Elizabeth Kwok MD, 800 Units at 10/16/21 1101    citalopram (CeleXA) tablet 20 mg, 20 mg, Oral, Daily, Elizabeth Kwok MD, 20 mg at 10/16/21 1101    docusate sodium (COLACE) capsule 100 mg, 100 mg, Oral, BID, Elizabeth Kwok MD, 100 mg at 10/12/21 1101    levothyroxine tablet 75 mcg, 75 mcg, Oral, Daily, Elizabeth Kwok MD, 75 mcg at 10/16/21 1101    melatonin tablet 3 mg, 3 mg, Oral, HS, Elizabeth Kwok MD, 3 mg at 10/15/21 2243    metoprolol (LOPRESSOR) injection 2 5 mg, 2 5 mg, Intravenous, Q6H PRN, Janine Ritchie PA-C, 2 5 mg at 10/15/21 0438    metoprolol tartrate (LOPRESSOR) tablet 25 mg, 25 mg, Oral, BID, Isela Funes PA-C, 25 mg at 10/16/21 1101    ondansetron (ZOFRAN) injection 4 mg, 4 mg, Intravenous, Q6H PRN, Elizabeth Kwok MD, 4 mg at 10/14/21 7486    piperacillin-tazobactam (ZOSYN) 2 25 g in sodium chloride 0 9 % 50 mL IVPB, 2 25 g, Intravenous, Q6H, Brennen Castaneda MD, Last Rate: 100 mL/hr at 10/16/21 1110, 2 25 g at 10/16/21 1110    saccharomyces boulardii (FLORASTOR) capsule 250 mg, 250 mg, Oral, Daily, Elizabeth Kwok MD, 250 mg at 10/16/21 1101    senna (SENOKOT) tablet 8 6 mg, 1 tablet, Oral, Daily, Elizabeth Kwok MD, 8 6 mg at 10/12/21 1101    sodium bicarbonate 75 mEq in sodium chloride 0 45 % 1,000 mL infusion, 75 mL/hr, Intravenous, Continuous, Claudette Frames, MD, Last Rate: 75 mL/hr at 10/16/21 0546, 75 mL/hr at 10/16/21 0546    Invasive Devices:      Lab Results:   Results from last 7 days   Lab Units 10/16/21  0419 10/15/21  0450 10/14/21  0435 10/13/21  0517 10/12/21  0156 10/12/21  0145 10/11/21  1652 10/11/21  0337 10/10/21  1256 10/10/21  0621   WBC Thousand/uL 4 93 6 20  --  7 21  --  3 96*  --  4 85  --  4 97   HEMOGLOBIN g/dL 7 7* 8 6*  --  8 3*  --  9 3* 9 2* 6 9*  --  7 1*   HEMATOCRIT % 25 7* 28 0*  --  26 3*  --  29 7* 29 0* 22 0*  --  23 2*   PLATELETS Thousands/uL 148* 194  --  243  --  244  --  212  --  187   POTASSIUM mmol/L 4 1 4 2 3 8 3 6  --  3 3*  --  3 0*  --  3 8   CHLORIDE mmol/L 107 112* 114* 114*  --  115*  --  113*  --  109*   CO2 mmol/L 18* 17* 19* 18*  --  15*  --  20*  --  18*   BUN mg/dL 99* 86* 74* 65*  --  53*  --  51*  --  44*   CREATININE mg/dL 4 99* 4 81* 4 56* 4 15*  --  3 63*  --  3 55*  --  3 60*   CALCIUM mg/dL 8 3 8 0* 7 9* 7 9*  --  8 0*  --  7 9*  --  8 5   MAGNESIUM mg/dL 2 3 2 5 2 1 2 4  --  2 3  --  1 7  --  1 7   PHOSPHORUS mg/dL 5 8* 4 1 2 7 2 3  --  3 2  --  5 0*  --  4 9*   ALK PHOS U/L 249*  --  185* 244*  --   --   --   --   --  81   ALT U/L 10*  --  11* 15  --   --   --   --   --  15   AST U/L 52*  --  80* 90*  --   --   --   --   --  72*   BLOOD CULTURE   --   --   --   --  No Growth After 4 Days  No Growth After 4 Days    --   --   --   --   --    LEUKOCYTES UA   --   --   --   --   --   --   --   --  Moderate*  --    BLOOD UA   --   --   --   -- --   --   --   --  Large*  --          Portions of the record may have been created with voice recognition software  Occasional wrong word or "sound a like" substitutions may have occurred due to the inherent limitations of voice recognition software  Read the chart carefully and recognize, using context, where substitutions have occurred  If you have any questions, please contact the dictating provider

## 2021-10-16 NOTE — PROGRESS NOTES
[facility]  Progress Note - Pam Guzman [birth year], 77 y o  female MRN: [id number]  Unit/Bed#: [facility] 929-01 Encounter: [id number]  Primary Care Provider: Ronak Tyson MD   Date and time admitted to hospital: 10/9/2021  2:19 PM    * Sepsis [facility])  Assessment & Plan  Sepsis POA  Poor source control with abscess/loculated collection of L perinephric region seen on CT scan on admjission  S/p IR drainage 10/13/21  Body fluid culture updated 10/16 is showing 4+ growth of both enterobacter aerogenes and enterococcus avium  C/w abx as per ID - on IV zosyn currently  May need to consider adjusting antibiotics based on final culture / sensitivity results  Overall poor prognosis with multiple co morbidities  Hopefully antibiotics and source control will improve her overall clinical picture        Renal hematoma, left  Assessment & Plan    Left renal hematoma:   Presented with left renal hematoma  Pigtail catheter is noted medial to kidney  Renal pelvis may be collapsed  Hemorrhage and thickening extending in the combined interfascial place of retrospective as well as some high density fluid within  S/p IR drainage 10/12/21 with JOSE ANTONIO drain placement x 2  Output appears to be 25mL from both drains over 24 hour period  General surgery / urology do not plan for surgical intervention  Will need to further discuss when drain removal appropriate  Acute renal failure superimposed on stage 5 chronic kidney disease, not on chronic dialysis [facility])  Assessment & Plan  Lab Results   Component Value Date    EGFR 8 10/16/2021    EGFR 8 10/15/2021    EGFR 9 10/14/2021    CREATININE 4 99 (H) 10/16/2021    CREATININE 4 81 (H) 10/15/2021    CREATININE 4 56 (H) 10/14/2021   worsening RF  In the setting of sepsis  Discussed with son regarding poor prognosis  Nephrology following    No plan for urgent dialysis at this time, although could consider if renal function continues to decline  Patient is still currently agreeable for HD if needed  Localized swelling of right upper extremity  Assessment & Plan  Pt presented with right upper extremity swelling  Vascular surgery consulted on admission  IR also consulted for fistulagram, plan to do fistulagram once patient is more stable  US doppler showed: There is a >50% stenosis noted in the proximal subclavian vein  There is a >50%  stenosis noted the proximal basilic vein, which also torturous  The dialysis AVF appears to be matured with adequate diameter, flow volumes and  less than 6mm in depth throughout the arm  Vascular and IR following  Pleural effusion  Assessment & Plan  Mild to moderate pleural effusion on CXR  S/p thoracentesis by IR 10/11/21  Consider follow up Xray for signs of respiratory distress / acute respiratory failure  Anemia due to stage 5 chronic kidney disease, not on chronic dialysis [facility])  Assessment & Plan    Anemia 2/2 CKD with component of acute blood loss anemia  Hgb noted to be 10/11/21 Transfused 1  unit PRBCs on 10/12/21  Hgb initially improved to the 9 range, now decreasing to 7 7 this morning  Will continue to follow closely, consider additional transfusion for hemoglobin less than 7  Polycythemia vera ([address] )  Assessment & Plan  Hx of Polycythemia vera and MDS  Significant anemia secondary to blood loss in the past    The patient is currently off of the [facility] treatment for her PV and getting mainly Aranesp on every 28 day basis  Hematology consulted for evaluation, recommend once patient is better/ stable, discharge, she will follow-up with Dr Fernanda Gibbons, her primary hematologist     Hypertension  Assessment & Plan  Continue with metoprolol 25 mg bid  Continue cautiously with hold parameters in place if needed for low BP  Obstructive uropathy  Assessment & Plan  Obstructive nephropathy  Chronic bilateral hydronephrosis stents were recently removed    Urology following  Chronic saddle pulmonary embolism (HCC)  Assessment & Plan  Chronic saddle pulmonary embolus  Eliquis 2 5 mg bid at home, currently on hold for now in the setting of anemia, perinephric fluid collection / hematoma  Will need to discuss with specialists regarding timeline to safely restart eliquis  VTE Pharmacologic Prophylaxis: VTE Score: 13 High Risk (Score >/= 5) - Pharmacological DVT Prophylaxis Contraindicated  Sequential Compression Devices Ordered  Discussions with Specialists or Other Care Team Provider: d/w ID    Education and Discussions with Family / Patient: Updated  (daughter) via phone  Time Spent for Care: 30 minutes  More than 50% of total time spent on counseling and coordination of care as described above  Current Length of Stay: 7 day(s)  Current Patient Status: Inpatient   Certification Statement: The patient will continue to require additional inpatient hospital stay due to need for IV antibiotics, IV fluids  Discharge Plan: Anticipate discharge in >72 hrs to discharge location to be determined pending rehab evaluations  Code Status: Level 1 - Full Code    Subjective:   Patient is feeling very tired  She has poor appetite  Nursing notes reviewed  Had hypotension, rapid afib early this morning, responsive to IV fluid hydration  Objective:   Vitals:   Temp (24hrs), Av 3 °F (36 3 °C), Min:96 8 °F (36 °C), Max:98 °F (36 7 °C)    Temp:  [96 8 °F (36 °C)-98 °F (36 7 °C)] 96 8 °F (36 °C)  HR:  [] 85  Resp:  [16-18] 16  BP: ()/(47-64) 106/58  SpO2:  [92 %-96 %] 93 %  Body mass index is 37 11 kg/m²  Input and Output Summary (last 24 hours): Intake/Output Summary (Last 24 hours) at 10/16/2021 1459  Last data filed at 10/16/2021 1416  Gross per 24 hour   Intake 420 ml   Output 110 ml   Net 310 ml       Physical Exam:   Physical Exam  Vitals and nursing note reviewed     Constitutional:       General: She is not in acute distress  Appearance: She is ill-appearing  She is not diaphoretic  HENT:      Head: Normocephalic  Mouth/Throat:      Mouth: Mucous membranes are dry  Cardiovascular:      Rate and Rhythm: Normal rate and regular rhythm  Pulses: Normal pulses  Heart sounds: Normal heart sounds  No murmur heard  Pulmonary:      Effort: Pulmonary effort is normal       Breath sounds: Normal breath sounds  Abdominal:      General: There is no distension  Tenderness: There is no abdominal tenderness  Musculoskeletal:      Right lower leg: No edema  Left lower leg: No edema  Skin:     General: Skin is warm and dry  Coloration: Skin is pale  Neurological:      Mental Status: She is oriented to person, place, and time            Additional Data:     Labs:  Results from last 7 days   Lab Units 10/16/21  0419   WBC Thousand/uL 4 93   HEMOGLOBIN g/dL 7 7*   HEMATOCRIT % 25 7*   PLATELETS Thousands/uL 148*   BANDS PCT % 1   LYMPHO PCT % 7*   MONO PCT % 4   EOS PCT % 2     Results from last 7 days   Lab Units 10/16/21  0419   SODIUM mmol/L 140   POTASSIUM mmol/L 4 1   CHLORIDE mmol/L 107   CO2 mmol/L 18*   BUN mg/dL 99*   CREATININE mg/dL 4 99*   ANION GAP mmol/L 15*   CALCIUM mg/dL 8 3   ALBUMIN g/dL 1 8*   TOTAL BILIRUBIN mg/dL 0 74   ALK PHOS U/L 249*   ALT U/L 10*   AST U/L 52*   GLUCOSE RANDOM mg/dL 99     Results from last 7 days   Lab Units 10/13/21  1712   INR  1 60*             Results from last 7 days   Lab Units 10/16/21  0419 10/13/21  1539 10/10/21  0621   LACTIC ACID mmol/L 1 1 1 8  --    PROCALCITONIN ng/ml  --   --  78 78*       Lines/Drains:  Invasive Devices     Peripherally Inserted Central Catheter Line            PICC Line 10/11/21 4 days          Line            Hemodialysis AV Fistula 09/30/21 Right Upper arm 16 days          Drain            Urostomy Ileal conduit RUQ 1837 days    Closed/Suction Drain Left Other (Comment) 2 days    Closed/Suction Drain Left Other (Comment) 10 Fr  2 days                Central Line:  Goal for removal: Will discontinue when meds requiring line are completed  Telemetry:  Telemetry Orders (From admission, onward)             48 Hour Telemetry Monitoring  Continuous x 48 hours     Question:  Reason for 48 Hour Telemetry  Answer:  Arrhythmias Requiring Medical Therapy (eg  SVT, Vtach/fib, Bradycardia, Uncontrolled A-fib)                 Telemetry Reviewed: Atrial fibrillation  HR averaging   Indication for Continued Telemetry Use: Arrthymias requiring medical therapy             Imaging: Reviewed radiology reports from this admission including: abdominal/pelvic CT    Recent Cultures (last 7 days):   Results from last 7 days   Lab Units 10/13/21  1928 10/13/21  1923 10/12/21  0156 10/11/21  1851 10/11/21  0222 10/10/21  1256   BLOOD CULTURE   --   --  No Growth After 4 Days  No Growth After 4 Days    --   --   --    GRAM STAIN RESULT  1+ Polys*  2+ Gram positive cocci in pairs*  2+ Gram positive rods*  1+ Gram negative rods* No Polys or Bacteria seen  --  1+ Polys  No organisms seen  --   --    URINE CULTURE   --   --   --   --   --  >100,000 cfu/ml Klebsiella pneumoniae*  >100,000 cfu/ml Enterobacter aerogenes*  20,000-29,000 cfu/ml Enterococcus faecalis*  <10,000 cfu/ml Enterococcus avium*   BODY FLUID CULTURE, STERILE  4+ Growth of Enterobacter aerogenes*  4+ Growth of Enterococcus avium* 4+ Growth of Enterobacter aerogenes*  4+ Growth of Enterococcus avium*  --  No growth  --   --    C DIFF TOXIN B BY PCR   --   --   --   --  Negative  --        Last 24 Hours Medication List:   Current Facility-Administered Medications   Medication Dose Route Frequency Provider Last Rate    acetaminophen  650 mg Oral Q6H PRN Erick Sims MD      acetaminophen  975 mg Oral Q8H [address] Erick Sims MD      aluminum-magnesium hydroxide-simethicone  30 mL Oral Q6H PRN Erick Sims MD      atorvastatin  40 mg Oral HS Wolfgang WONG Goldy Oliveira MD      calcitriol  0 25 mcg Oral Daily Imer Queen MD      cholecalciferol  800 Units Oral Daily Imer Queen MD      citalopram  20 mg Oral Daily Imer Queen MD      docusate sodium  100 mg Oral BID Imer Queen MD      levothyroxine  75 mcg Oral Daily Imer Queen MD      melatonin  3 mg Oral HS Imer Queen MD      metoprolol  2 5 mg Intravenous Q6H PRN Therese Palumbo PA-C      metoprolol tartrate  25 mg Oral BID Therese Palumbo PA-C      ondansetron  4 mg Intravenous Q6H PRN Imer Queen MD      piperacillin-tazobactam  2 25 g Intravenous Q6H Guy Roy MD 2 25 g (10/16/21 1110)    saccharomyces boulardii  250 mg Oral Daily Imer Queen MD      senna  1 tablet Oral Daily Imer Queen MD      sodium bicarbonate infusion  75 mL/hr Intravenous Continuous Kiarra Lynn MD 75 mL/hr (10/16/21 2581)        Today, Patient Was Seen By: Siria Perez PA-C    **Please Note: This note may have been constructed using a voice recognition system  **

## 2021-10-16 NOTE — ASSESSMENT & PLAN NOTE
Sepsis POA  Poor source control with abscess/loculated collection of L perinephric region seen on CT scan on admjission  S/p IR drainage 10/13/21  Body fluid culture updated 10/16 is showing 4+ growth of both enterobacter aerogenes and enterococcus avium  C/w abx as per ID - on IV zosyn currently  May need to consider adjusting antibiotics based on final culture / sensitivity results  Overall poor prognosis with multiple co morbidities    Hopefully antibiotics and source control will improve her overall clinical picture

## 2021-10-16 NOTE — ASSESSMENT & PLAN NOTE
Hx of Polycythemia vera and MDS  Significant anemia secondary to blood loss in the past    The patient is currently off of the [facility] treatment for her PV and getting mainly Aranesp on every 28 day basis    Hematology consulted for evaluation, recommend once patient is better/ stable, discharge, she will follow-up with Dr Vaibhav Shelley, her primary hematologist

## 2021-10-17 ENCOUNTER — APPOINTMENT (INPATIENT)
Dept: RADIOLOGY | Facility: HOSPITAL | Age: 76
DRG: 981 | End: 2021-10-17
Payer: COMMERCIAL

## 2021-10-17 LAB
ANION GAP SERPL CALCULATED.3IONS-SCNC: 12 MMOL/L (ref 4–13)
BACTERIA BLD CULT: NORMAL
BACTERIA BLD CULT: NORMAL
BUN SERPL-MCNC: 112 MG/DL (ref 5–25)
CALCIUM SERPL-MCNC: 8.6 MG/DL (ref 8.3–10.1)
CHLORIDE SERPL-SCNC: 107 MMOL/L (ref 100–108)
CO2 SERPL-SCNC: 20 MMOL/L (ref 21–32)
CREAT SERPL-MCNC: 5.7 MG/DL (ref 0.6–1.3)
ERYTHROCYTE [DISTWIDTH] IN BLOOD BY AUTOMATED COUNT: 19 % (ref 11.6–15.1)
GFR SERPL CREATININE-BSD FRML MDRD: 7 ML/MIN/1.73SQ M
GLUCOSE SERPL-MCNC: 112 MG/DL (ref 65–140)
HAPTOGLOB SERPL-MCNC: 183 MG/DL (ref 42–346)
HCT VFR BLD AUTO: 28.8 % (ref 34.8–46.1)
HGB BLD-MCNC: 8.9 G/DL (ref 11.5–15.4)
MCH RBC QN AUTO: 28.4 PG (ref 26.8–34.3)
MCHC RBC AUTO-ENTMCNC: 30.9 G/DL (ref 31.4–37.4)
MCV RBC AUTO: 92 FL (ref 82–98)
PLATELET # BLD AUTO: 157 THOUSANDS/UL (ref 149–390)
PMV BLD AUTO: 11.3 FL (ref 8.9–12.7)
POTASSIUM SERPL-SCNC: 4.1 MMOL/L (ref 3.5–5.3)
RBC # BLD AUTO: 3.13 MILLION/UL (ref 3.81–5.12)
SODIUM SERPL-SCNC: 139 MMOL/L (ref 136–145)
WBC # BLD AUTO: 9.36 THOUSAND/UL (ref 4.31–10.16)

## 2021-10-17 PROCEDURE — 99232 SBSQ HOSP IP/OBS MODERATE 35: CPT | Performed by: INTERNAL MEDICINE

## 2021-10-17 PROCEDURE — 99232 SBSQ HOSP IP/OBS MODERATE 35: CPT | Performed by: UROLOGY

## 2021-10-17 PROCEDURE — 85027 COMPLETE CBC AUTOMATED: CPT | Performed by: INTERNAL MEDICINE

## 2021-10-17 PROCEDURE — 80048 BASIC METABOLIC PNL TOTAL CA: CPT | Performed by: INTERNAL MEDICINE

## 2021-10-17 PROCEDURE — 82272 OCCULT BLD FECES 1-3 TESTS: CPT | Performed by: INTERNAL MEDICINE

## 2021-10-17 PROCEDURE — 71045 X-RAY EXAM CHEST 1 VIEW: CPT

## 2021-10-17 RX ADMIN — PIPERACILLIN AND TAZOBACTAM 2.25 G: 36; 4.5 INJECTION, POWDER, FOR SOLUTION INTRAVENOUS at 11:41

## 2021-10-17 RX ADMIN — METOPROLOL TARTRATE 25 MG: 25 TABLET, FILM COATED ORAL at 19:46

## 2021-10-17 RX ADMIN — PIPERACILLIN AND TAZOBACTAM 2.25 G: 36; 4.5 INJECTION, POWDER, FOR SOLUTION INTRAVENOUS at 02:51

## 2021-10-17 RX ADMIN — PIPERACILLIN AND TAZOBACTAM 2.25 G: 36; 4.5 INJECTION, POWDER, FOR SOLUTION INTRAVENOUS at 21:20

## 2021-10-17 RX ADMIN — DOCUSATE SODIUM 100 MG: 100 CAPSULE, LIQUID FILLED ORAL at 11:40

## 2021-10-17 RX ADMIN — ACETAMINOPHEN 975 MG: 325 TABLET, FILM COATED ORAL at 21:20

## 2021-10-17 RX ADMIN — SODIUM BICARBONATE 50 ML/HR: 84 INJECTION, SOLUTION INTRAVENOUS at 02:26

## 2021-10-17 RX ADMIN — MELATONIN TAB 3 MG 3 MG: 3 TAB at 21:20

## 2021-10-17 RX ADMIN — CALCITRIOL 0.25 MCG: 0.25 CAPSULE, LIQUID FILLED ORAL at 11:42

## 2021-10-17 RX ADMIN — SODIUM BICARBONATE 50 ML/HR: 84 INJECTION, SOLUTION INTRAVENOUS at 22:19

## 2021-10-17 RX ADMIN — STANDARDIZED SENNA CONCENTRATE 8.6 MG: 8.6 TABLET ORAL at 11:40

## 2021-10-17 RX ADMIN — ACETAMINOPHEN 975 MG: 325 TABLET, FILM COATED ORAL at 05:34

## 2021-10-17 RX ADMIN — LEVOTHYROXINE SODIUM 75 MCG: 75 TABLET ORAL at 11:40

## 2021-10-17 RX ADMIN — ATORVASTATIN CALCIUM 40 MG: 40 TABLET, FILM COATED ORAL at 21:20

## 2021-10-17 RX ADMIN — CITALOPRAM HYDROBROMIDE 20 MG: 20 TABLET ORAL at 11:40

## 2021-10-17 RX ADMIN — Medication 250 MG: at 11:40

## 2021-10-17 RX ADMIN — PIPERACILLIN AND TAZOBACTAM 2.25 G: 36; 4.5 INJECTION, POWDER, FOR SOLUTION INTRAVENOUS at 16:49

## 2021-10-17 RX ADMIN — METOPROLOL TARTRATE 25 MG: 25 TABLET, FILM COATED ORAL at 11:40

## 2021-10-17 NOTE — ASSESSMENT & PLAN NOTE
Anemia 2/2 CKD with component of acute blood loss anemia  Hgb noted to be 10/11/21 Transfused 1  unit PRBCs on 10/12/21  Hgb initially improved to the 9 range, now decreasing to 7 7 on 10/16; will repeat labs today  Will continue to follow closely, consider additional transfusion for hemoglobin less than 7

## 2021-10-17 NOTE — PROGRESS NOTES
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  There is a >50%  stenosis noted the proximal basilic vein, which also torturous  The dialysis AVF appears to be matured with adequate diameter, flow volumes and  less than 6mm in depth throughout the arm  Vascular and IR following  Pleural effusion  Assessment & Plan  Mild to moderate pleural effusion on CXR  S/p thoracentesis by IR 10/11/21  Will repeat chest x-ray to assess for reaccumulation  SpO2 93% on room air, monitor      Renal hematoma, left  Assessment & Plan    Left renal hematoma:   Presented with left renal hematoma  Pigtail catheter is noted medial to kidney  Renal pelvis may be collapsed  Hemorrhage and thickening extending in the combined interfascial place of retrospective as well as some high density fluid within  S/p IR drainage 10/12/21 with JOSE ANTONIO drain placement x 2  Output appears to be 25mL from both drains over 24 hour period  General surgery / urology do not plan for surgical intervention  Will need to further discuss when drain removal appropriate            Acute renal failure superimposed on stage 5 chronic kidney disease, not on chronic dialysis [facility])  Assessment & Plan  Lab Results   Component Value Date    EGFR 8 10/16/2021    EGFR 8 10/15/2021    EGFR 9 10/14/2021    CREATININE 4 99 (H) 10/16/2021    CREATININE 4 81 (H) 10/15/2021    CREATININE 4 56 (H) 10/14/2021   worsening RF    In the setting of sepsis  Discussed with son regarding poor prognosis  Nephrology following  No plan for urgent dialysis at this time, although could consider if renal function continues to decline  Patient is still currently agreeable for HD if needed  Anemia due to stage 5 chronic kidney disease, not on chronic dialysis [facility])  Assessment & Plan    Anemia 2/2 CKD with component of acute blood loss anemia  Hgb noted to be 10/11/21 Transfused 1  unit PRBCs on 10/12/21  Hgb initially improved to the 9 range, now decreasing to 7 7 on 10/16; will repeat labs today  Will continue to follow closely, consider additional transfusion for hemoglobin less than 7  Polycythemia vera ([facility] [address] )  Assessment & Plan  Hx of Polycythemia vera and MDS  Significant anemia secondary to blood loss in the past    The patient is currently off of the [facility] treatment for her PV and getting mainly Aranesp on every 28 day basis  Hematology consulted for evaluation, recommend once patient is better/ stable, discharge, she will follow-up with Dr Dede Velarde, her primary hematologist     Hypertension  Assessment & Plan  Continue with metoprolol 25 mg bid  Continue cautiously with hold parameters in place if needed for low BP  Obstructive uropathy  Assessment & Plan  Obstructive nephropathy  Chronic bilateral hydronephrosis stents were recently removed  Urology and Nephrology following; recommend continuing antibiotics and present treatment      Chronic saddle pulmonary embolism ([facility] [address] )  Assessment & Plan  Chronic saddle pulmonary embolus  Eliquis 2 5 mg bid at home, currently on hold for now in the setting of anemia, perinephric fluid collection / hematoma  Will need to discuss with specialists regarding timeline to safely restart eliquis  * Sepsis ([facility] [address] )  Assessment & Plan  Sepsis POA  Poor source control with abscess/loculated collection of L perinephric region seen on CT scan on admjission    S/p IR drainage 10/13/21  Body fluid culture updated 10/16 is showing 4+ growth of both enterobacter aerogenes and enterococcus avium  C/w abx as per ID - on IV zosyn currently  May need to consider adjusting antibiotics based on final culture / sensitivity results  Overall poor prognosis with multiple co morbidities  Hopefully antibiotics and source control will improve her overall clinical picture          VTE Pharmacologic Prophylaxis:   Pharmacologic: Pharmacologic VTE Prophylaxis contraindicated due to Perinephric hematoma  Mechanical VTE Prophylaxis in Place: Yes    Patient Centered Rounds: I have performed bedside rounds with nursing staff today  Discussions with Specialists or Other Care Team Provider:  Nephrology    Education and Discussions with Family / Patient: Discussed treatment plan with family and patient who agree with current plan; encouraged to ask questions and participate  Time Spent for Care: 30 minutes  More than 50% of total time spent on counseling and coordination of care as described above  Current Length of Stay: 8 day(s)    Current Patient Status: Inpatient   Certification Statement: The patient will continue to require additional inpatient hospital stay due to Treatment of renal hematoma, pleural effusions, sepsis    Discharge Plan: To be determined    Code Status: Level 1 - Full Code      Subjective:   Patient seen and examined bedside, reports feeling fatigued  Patient extremely grossly edematous and currently being treated for sepsis with Zosyn  Patient noted to have loculated fluid in the pelvis and left renal hematoma  Followed by Urology  with no intention for surgical intervention at this time  Currently being monitored for LUANN by Nephrology with possibility of dialysis moving forward  Additionally, imaging showing ground-glass opacities in the upper left lobe concerning for pneumonia versus atelectasis    Has undergone thoracentesis for pleural effusion and will reassess with chest x-ray today  Urine output is suboptimal at 0 1 mL/kg/hour; will defer to Nephrology who may start patient on Lasix  Concerning as patient with history of saddle PE but Eliquis on hold secondary to hematoma  Discussed with Urology moving forward to determine patient stable enough to restart anticoagulation  Have ordered labs for today, awaiting them to be drawn  Monitor on labs in the a m  As well  Objective:     Vitals:   Temp (24hrs), Av 5 °F (36 4 °C), Min:97 3 °F (36 3 °C), Max:97 8 °F (36 6 °C)    Temp:  [97 3 °F (36 3 °C)-97 8 °F (36 6 °C)] 97 4 °F (36 3 °C)  HR:  [79-93] 93  Resp:  [18-20] 18  BP: ()/(52-60) 114/58  SpO2:  [92 %-93 %] 93 %  Body mass index is 37 11 kg/m²  Input and Output Summary (last 24 hours): Intake/Output Summary (Last 24 hours) at 10/17/2021 1505  Last data filed at 10/17/2021 1300  Gross per 24 hour   Intake 280 ml   Output 280 ml   Net 0 ml       Physical Exam:     Physical Exam  Vitals and nursing note reviewed  Constitutional:       General: She is not in acute distress  Appearance: She is well-developed  HENT:      Head: Normocephalic and atraumatic  Eyes:      Conjunctiva/sclera: Conjunctivae normal    Cardiovascular:      Rate and Rhythm: Normal rate and regular rhythm  Heart sounds: No murmur heard  Pulmonary:      Effort: Pulmonary effort is normal  No respiratory distress  Comments: Reduced breath sounds bilateral  Abdominal:      Palpations: Abdomen is soft  Tenderness: There is no abdominal tenderness  Musculoskeletal:      Cervical back: Neck supple  Right lower leg: Edema present  Left lower leg: Edema present  Comments: Grossly edematous/anasarca; on tunneled central line right shoulder   Skin:     General: Skin is warm and dry  Comments: Multiple ecchymotic lesions bilateral upper extremities   Neurological:      Mental Status: She is alert and oriented to person, place, and time  Psychiatric:      Comments: Flat affect           Additional Data:     Labs:    Results from last 7 days   Lab Units 10/16/21  0419   WBC Thousand/uL 4 93   HEMOGLOBIN g/dL 7 7*   HEMATOCRIT % 25 7*   PLATELETS Thousands/uL 148*   BANDS PCT % 1   LYMPHO PCT % 7*   MONO PCT % 4   EOS PCT % 2     Results from last 7 days   Lab Units 10/16/21  0419   SODIUM mmol/L 140   POTASSIUM mmol/L 4 1   CHLORIDE mmol/L 107   CO2 mmol/L 18*   BUN mg/dL 99*   CREATININE mg/dL 4 99*   ANION GAP mmol/L 15*   CALCIUM mg/dL 8 3   ALBUMIN g/dL 1 8*   TOTAL BILIRUBIN mg/dL 0 74   ALK PHOS U/L 249*   ALT U/L 10*   AST U/L 52*   GLUCOSE RANDOM mg/dL 99     Results from last 7 days   Lab Units 10/13/21  1712   INR  1 60*             Results from last 7 days   Lab Units 10/16/21  0419 10/13/21  1539   LACTIC ACID mmol/L 1 1 1 8           * I Have Reviewed All Lab Data Listed Above  * Additional Pertinent Lab Tests Reviewed: All Labs Within Last 24 Hours Reviewed    Imaging:    Imaging Reports Reviewed Today Include:   Imaging Personally Reviewed by Myself Includes:      Recent Cultures (last 7 days):     Results from last 7 days   Lab Units 10/13/21  1928 10/13/21  1923 10/12/21  0156 10/11/21  1851 10/11/21  0222   BLOOD CULTURE   --   --  No Growth After 5 Days  No Growth After 5 Days    --   --    GRAM STAIN RESULT  1+ Polys*  2+ Gram positive cocci in pairs*  2+ Gram positive rods*  1+ Gram negative rods* No Polys or Bacteria seen  --  1+ Polys  No organisms seen  --    BODY FLUID CULTURE, STERILE  4+ Growth of Enterobacter aerogenes*  4+ Growth of Enterococcus avium* 4+ Growth of Enterobacter aerogenes*  4+ Growth of Enterococcus avium*  --  No growth  --    C DIFF TOXIN B BY PCR   --   --   --   --  Negative       Last 24 Hours Medication List:   Current Facility-Administered Medications   Medication Dose Route Frequency Provider Last Rate    acetaminophen  650 mg Oral Q6H PRN Shavon Hernandez MD      acetaminophen  975 mg Oral Q8H [id number] [address] Tim MD David      aluminum-magnesium hydroxide-simethicone  30 mL Oral Q6H PRN Imer Queen MD      atorvastatin  40 mg Oral HS Imer Queen MD      calcitriol  0 25 mcg Oral Daily Imer Queen MD      cholecalciferol  800 Units Oral Daily Imer Queen MD      citalopram  20 mg Oral Daily Imer Queen MD      docusate sodium  100 mg Oral BID Imer Queen MD      levothyroxine  75 mcg Oral Daily Imer Queen MD      melatonin  3 mg Oral HS Imer Queen MD      metoprolol  2 5 mg Intravenous Q6H PRN Therese Palumbo PA-C      metoprolol tartrate  25 mg Oral BID Therese Palumbo PA-C      ondansetron  4 mg Intravenous Q6H PRN Imer Queen MD      piperacillin-tazobactam  2 25 g Intravenous Q6H Guy Roy MD 2 25 g (10/17/21 1141)    saccharomyces boulardii  250 mg Oral Daily Imer Queen MD      senna  1 tablet Oral Daily Imer Queen MD      sodium bicarbonate infusion  50 mL/hr Intravenous Continuous Lacy Napoleon, DO 50 mL/hr (10/17/21 0226)        Today, Patient Was Seen By: Hector Carlton MD    ** Please Note: Dictation voice to text software may have been used in the creation of this document   **

## 2021-10-17 NOTE — ASSESSMENT & PLAN NOTE
Mild to moderate pleural effusion on CXR  S/p thoracentesis by IR 10/11/21    Will repeat chest x-ray to assess for reaccumulation  SpO2 93% on room air, monitor

## 2021-10-17 NOTE — PLAN OF CARE
Problem: MOBILITY - ADULT  Goal: Maintain or return to baseline ADL function  Description: INTERVENTIONS:  -  Assess patient's ability to carry out ADLs; assess patient's baseline for ADL function and identify physical deficits which impact ability to perform ADLs (bathing, care of mouth/teeth, toileting, grooming, dressing, etc )  - Assess/evaluate cause of self-care deficits   - Assess range of motion  - Assess patient's mobility; develop plan if impaired  - Assess patient's need for assistive devices and provide as appropriate  - Encourage maximum independence but intervene and supervise when necessary  - Involve family in performance of ADLs  - Assess for home care needs following discharge   - Consider OT consult to assist with ADL evaluation and planning for discharge  - Provide patient education as appropriate  Outcome: Progressing  Goal: Maintains/Returns to pre admission functional level  Description: INTERVENTIONS:  - Perform BMAT or MOVE assessment daily    - Set and communicate daily mobility goal to care team and patient/family/caregiver     - Collaborate with rehabilitation services on mobility goals if consulted  - Record patient progress and toleration of activity level   Outcome: Progressing     Problem: Potential for Falls  Goal: Patient will remain free of falls  Description: INTERVENTIONS:  - Educate patient/family on patient safety including physical limitations  - Instruct patient to call for assistance with activity   - Consult OT/PT to assist with strengthening/mobility   - Keep Call bell within reach  - Keep bed low and locked with side rails adjusted as appropriate  - Keep care items and personal belongings within reach  - Initiate and maintain comfort rounds  - Make Fall Risk Sign visible to staff  - Offer Toileting every 4 Hours, in advance of need  - Initiate/Maintain bed alarm   - Obtain necessary fall risk management equipment  - Apply yellow socks and bracelet for high fall risk patients  - Consider moving patient to room near nurses station  Outcome: Progressing     Problem: Prexisting or High Potential for Compromised Skin Integrity  Goal: Skin integrity is maintained or improved  Description: INTERVENTIONS:  - Identify patients at risk for skin breakdown  - Assess and monitor skin integrity  - Assess and monitor nutrition and hydration status  - Monitor labs   - Assess for incontinence   - Turn and reposition patient  - Assist with mobility/ambulation  - Relieve pressure over bony prominences  - Avoid friction and shearing  - Provide appropriate hygiene as needed including keeping skin clean and dry  - Evaluate need for skin moisturizer/barrier cream  - Collaborate with interdisciplinary team   - Patient/family teaching  - Consider wound care consult   Outcome: Progressing

## 2021-10-17 NOTE — PROGRESS NOTES
Cardiology Progress Note - Domingo Guthrie 77 y o  female MRN: [id number]    Unit/Bed#: [facility] 929-01 Encounter: [id number]        Hospital Problems:  Principal Problem:    Sepsis ([facility] [address] )  Active Problems:    Chronic saddle pulmonary embolism ([facility] [address] )    Obstructive uropathy    Hypertension    Polycythemia vera ([facility] [address] )    Anemia due to stage 5 chronic kidney disease, not on chronic dialysis ([facility] )    Acute renal failure superimposed on stage 5 chronic kidney disease, not on chronic dialysis (HCC)    Renal hematoma, left    Pleural effusion    Localized swelling of right upper extremity      Assessment/Recommendations:  Paroxysmal atrial fibrillation in the setting of sepsis:    Back in sinus rhythm  Blood pressure is now stable  No anticoagulation for now due to active bleeding issues  [id number] 4, Has-Bled score 3  Echo showed preserved LV systolic function with increased LV wall thickness  Mild MR noted  PA pressure 54  Continue low-dose metoprolol for now        Left renal hematoma:  Conservative management      Stage 5 CKD with anemia of chronic disease  Creat 4 99  Appreciate nephrology help  Subjective:     Patient seen and examined  Overnight events reviewed  Remains lethargic  States she is hurting all over  Review of System:  Review of system was conducted and was negative except for findings stated  above  Cardiac testing:     Left Ventricle: Left ventricular cavity size is normal  Systolic function is normal  Wall motion is normal  Diastolic function is normal  Wall thickness is mild to moderately increased  There is concentric remodeling    Right Ventricle: Right ventricular cavity size is upper normal  Systolic function is normal     Left Atrium: The atrium is mildly dilated    Aortic Valve: There is mild regurgitation    Mitral Valve: There is mild annular calcification    Tricuspid Valve: There is mild regurgitation      Pulmonary Artery: The estimated pulmonary artery systolic pressure is 54 0 mmHg  The pulmonary artery systolic pressure is moderately increased        Current Facility-Administered Medications:     acetaminophen (TYLENOL) tablet 650 mg, 650 mg, Oral, Q6H PRN, Rosa Maria Garcia MD, 650 mg at 10/11/21 1739    acetaminophen (TYLENOL) tablet 975 mg, 975 mg, Oral, Q8H [facility], Rosa Maria Garcia MD, 975 mg at 10/17/21 0534    aluminum-magnesium hydroxide-simethicone (MYLANTA) oral suspension 30 mL, 30 mL, Oral, Q6H PRN, Rosa Maria Garcia MD    atorvastatin (LIPITOR) tablet 40 mg, 40 mg, Oral, HS, Rosa Maria Garcia MD, 40 mg at 10/16/21 2140    calcitriol (ROCALTROL) capsule 0 25 mcg, 0 25 mcg, Oral, Daily, Rosa Maria Garcia MD, 0 25 mcg at 10/16/21 1100    cholecalciferol (VITAMIN D) oral liquid 800 Units, 800 Units, Oral, Daily, Rosa Maria Garcia MD, 800 Units at 10/16/21 1101    citalopram (CeleXA) tablet 20 mg, 20 mg, Oral, Daily, Rosa Maria Garcia MD, 20 mg at 10/16/21 1101    docusate sodium (COLACE) capsule 100 mg, 100 mg, Oral, BID, Rosa Maria Garcia MD, 100 mg at 10/12/21 1101    levothyroxine tablet 75 mcg, 75 mcg, Oral, Daily, Rosa Maria Garcia MD, 75 mcg at 10/16/21 1101    melatonin tablet 3 mg, 3 mg, Oral, HS, Rosa Maria Garcia MD, 3 mg at 10/16/21 2140    metoprolol (LOPRESSOR) injection 2 5 mg, 2 5 mg, Intravenous, Q6H PRN, Dior Gomes PA-C, 2 5 mg at 10/15/21 0438    metoprolol tartrate (LOPRESSOR) tablet 25 mg, 25 mg, Oral, BID, Idalia Funes PA-C, 25 mg at 10/16/21 1719    ondansetron (ZOFRAN) injection 4 mg, 4 mg, Intravenous, Q6H PRN, Rosa Maria Garcia MD, 4 mg at 10/14/21 2153    piperacillin-tazobactam (ZOSYN) 2 25 g in sodium chloride 0 9 % 50 mL IVPB, 2 25 g, Intravenous, Q6H, Villa Pereira MD, Last Rate: 100 mL/hr at 10/17/21 0251, 2 25 g at 10/17/21 0251    saccharomyces boulardii (FLORASTOR) capsule 250 mg, 250 mg, Oral, Daily, Rosa Maria Garcia MD, 250 mg at 10/16/21 1101    senna (SENOKOT) tablet 8 6 mg, 1 tablet, Oral, Daily, Dmitriy Lyons MD, 8 6 mg at 10/12/21 1101    sodium bicarbonate 75 mEq in sodium chloride 0 45 % 1,000 mL infusion, 50 mL/hr, Intravenous, Continuous, Dallas Spencer DO, Last Rate: 50 mL/hr at 10/17/21 0226, 50 mL/hr at 10/17/21 0226     Objective:     Vitals:   Blood pressure 115/60, pulse 89, temperature (!) 97 3 °F (36 3 °C), resp  rate 18, height 5' (1 524 m), weight 86 2 kg (190 lb), SpO2 92 %, not currently breastfeeding  Body mass index is 37 11 kg/m²  Orthostatic Blood Pressures      Most Recent Value   Blood Pressure  115/60 filed at 10/17/2021 5814         Systolic (12OHI), FCJ:388 , Min:97 , KKY:638     Diastolic (02OKX), GMU:02, Min:52, Max:60      Intake/Output Summary (Last 24 hours) at 10/17/2021 1012  Last data filed at 10/17/2021 0944  Gross per 24 hour   Intake 520 ml   Output 280 ml   Net 240 ml     Weight (last 2 days)     Date/Time   Weight    10/15/21 1245   86 2 (190)              Physical Exam:    GEN: No acute distress  Appears weak    EYES:   conjunctival pallor  HEART: regular rhythm, normal S1 and S2, 2/6 SM  LUNGS:  Decreased air entry in bases      Labs & Results:        Results from last 7 days   Lab Units 10/16/21  0419 10/15/21  0450 10/13/21  0517   WBC Thousand/uL 4 93 6 20 7 21   HEMOGLOBIN g/dL 7 7* 8 6* 8 3*   HEMATOCRIT % 25 7* 28 0* 26 3*   PLATELETS Thousands/uL 148* 194 243         Results from last 7 days   Lab Units 10/16/21  0419 10/15/21  0450 10/14/21  0435 10/13/21  0517   POTASSIUM mmol/L 4 1 4 2 3 8 3 6   CHLORIDE mmol/L 107 112* 114* 114*   CO2 mmol/L 18* 17* 19* 18*   BUN mg/dL 99* 86* 74* 65*   CREATININE mg/dL 4 99* 4 81* 4 56* 4 15*   CALCIUM mg/dL 8 3 8 0* 7 9* 7 9*   ALK PHOS U/L 249*  --  185* 244*   ALT U/L 10*  --  11* 15   AST U/L 52*  --  80* 90*     Results from last 7 days   Lab Units 10/13/21  1712 10/11/21  0337   INR  1 60* 2 00*   PTT seconds 45* 46*     Results from last 7 days   Lab Units 10/16/21  0419 10/15/21  0450 10/14/21  0435 MAGNESIUM mg/dL 2 3 2 5 2 1     No results for input(s): NTBNP in the last 72 hours  Available cardiology studies, imaging and lab results independently reviewed today  This note was completed in part utilizing m-modal fluency direct voice recognition software  Grammatical errors, random word insertion, spelling mistakes, occasional wrong word or "sound-alike" substitutions and incomplete sentences may be an occasional consequence of the system secondary to software limitations, ambient noise and hardware issues  At the time of dictation, efforts were made to edit, clarify and /or correct errors  Please read the chart carefully and recognize, using context, where substitutions have occurred  If you have any questions or concerns about the context, text or information contained within the body of this dictation, please contact myself, the provider, for further clarification

## 2021-10-17 NOTE — PROGRESS NOTES
Progress Note - Urology Progress  Yesica Sands 77 y o  female MRN: [id number]  Unit/Bed#: [facility] 929-01 Encounter: [id number]    Assessment:  Retroperitoneal hematoma with sepsis-status post drainage October 13th    Plan:  Continue antibiotics and present therapy  Subjective/Objective       Subjective:  Lethargic without specific complaint  She appears discouraged  Objective:     Blood pressure 115/60, pulse 89, temperature (!) 97 3 °F (36 3 °C), resp  rate 18, height 5' (1 524 m), weight 86 2 kg (190 lb), SpO2 92 %, not currently breastfeeding  ,Body mass index is 37 11 kg/m²  Intake/Output Summary (Last 24 hours) at 10/17/2021 0958  Last data filed at 10/17/2021 0944  Gross per 24 hour   Intake 520 ml   Output 280 ml   Net 240 ml       Invasive Devices     Peripherally Inserted Central Catheter Line            PICC Line 10/11/21 5 days          Line            Hemodialysis AV Fistula 09/30/21 Right Upper arm 17 days          Drain            Urostomy Ileal conduit RUQ 1838 days    Closed/Suction Drain Left Other (Comment) 3 days    Closed/Suction Drain Left Other (Comment) 10 Fr  3 days                Review of Systems   Constitutional: Positive for fatigue  Negative for fever  HENT: Negative  Respiratory: Negative for shortness of breath  Cardiovascular: Negative for chest pain  Gastrointestinal: Positive for abdominal pain  Genitourinary:        Status post cystectomy   Allergic/Immunologic: Negative  Neurological: Negative  Physical Exam: /60   Pulse 89   Temp (!) 97 3 °F (36 3 °C)   Resp 18   Ht 5' (1 524 m)   Wt 86 2 kg (190 lb)   SpO2 92%   BMI 37 11 kg/m²   General appearance: alert, cooperative, fatigued and no distress  Head: Normocephalic, without obvious abnormality, atraumatic  Neck: no JVD and supple, symmetrical, trachea midline  Back: symmetric, no curvature  ROM normal  No CVA tenderness    Lungs: Normal respiratory effort  Abdomen: Obese, mildly tender without peritoneal signs, right lower quadrant stoma draining clear urine, left-sided flank drains in place  Extremities: Anasarca  Neurologic: Grossly normal    Lab, Imaging and other studies:I have personally reviewed pertinent lab results    , CBC: No results found for: WBC, HGB, HCT, MCV, PLT, ADJUSTEDWBC, MCH, MCHC, RDW, MPV, NRBC, CMP: No results found for: SODIUM, K, CL, CO2, ANIONGAP, BUN, CREATININE, GLUCOSE, CALCIUM, AST, ALT, ALKPHOS, PROT, BILITOT, EGFR

## 2021-10-17 NOTE — ASSESSMENT & PLAN NOTE
Hx of Polycythemia vera and MDS  Significant anemia secondary to blood loss in the past    The patient is currently off of the [facility] treatment for her PV and getting mainly Aranesp on every 28 day basis    Hematology consulted for evaluation, recommend once patient is better/ stable, discharge, she will follow-up with Dr Rhonda Shea, her primary hematologist

## 2021-10-17 NOTE — NURSING NOTE
Pt 1700 dose of Zosyn found not running at approx 2130  Medication that was previously hung was started at that time  Next dose due at 2330, pharmacy messaged to retime 2330 dose as well as for future administrations

## 2021-10-17 NOTE — ASSESSMENT & PLAN NOTE
Obstructive nephropathy  Chronic bilateral hydronephrosis stents were recently removed    Urology and Nephrology following; recommend continuing antibiotics and present treatment

## 2021-10-17 NOTE — PROGRESS NOTES
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 Baseline creatinine is 3-3 5 and now this is progressively getting worse  ? Diuretics are on hold  ? No urgent indication for dialysis  ? Pretty lethargic on exam  ? If any signs of pulmonary edema will give IV Lasix  ? Overall given multiple comorbidities dialysis may cause more harm than benefit  ? Would repeat a BMP today  ? Blood pressures were low overnight     2  Sepsis-with a retroperitoneal hematoma  ? Urology on board for observation  ? Empiric antibiotics  ? Id following     3  Hypokalemia  ? These were repleted and improved     4  Metabolic acidosis  ? Currently on a hypotonic bicarb drip  ? Continue decrease rate to 50 cc an hour repeat blood work and adjust bicarb drip according     5  Pleural effusion  ? Status post thoracentesis October 11th     6  Polycythemia vera  ? Per primary team    SUBJECTIVE:    Patient remains encephalopathic  Was taking in some p o    Oxygenating relatively well    OBJECTIVE:  Current Weight: Weight - Scale: 86 2 kg (190 lb)  @  Vitals:    10/16/21 1554 10/16/21 2032 10/17/21 0729 10/17/21 1114   BP: 97/52  115/60 114/58   BP Location:       Pulse: 79  89 93   Resp: 20  18 18   Temp: 97 8 °F (36 6 °C)  (!) 97 3 °F (36 3 °C) (!) 97 4 °F (36 3 °C)   TempSrc:       SpO2: 93% 93% 92% 93%   Weight:       Height:           Intake/Output Summary (Last 24 hours) at 10/17/2021 1311  Last data filed at 10/17/2021 0944  Gross per 24 hour   Intake 520 ml   Output 280 ml   Net 240 ml     Weight (last 2 days)     Date/Time   Weight    10/15/21 1245   86 2 (190)              General: conscious, cooperative, in no acute distress   Morbidly obese  Eyes: conjunctivae pink, anicteric sclerae  ENT: lips and mucous membranes moist  Neck: supple, no JVD  Chest: no respiratory distress, no accessory muscle use, normal respiratory effort  CVS: normal heart rate, no friction rub  Abdomen: soft, non-tender, non-distended, normoactive bowel sounds  Extremities: no edema of both legs  Skin: no rash  Neuro: awake, alert, oriented      Medications:    Current Facility-Administered Medications:     acetaminophen (TYLENOL) tablet 650 mg, 650 mg, Oral, Q6H PRN, John Mejia MD, 650 mg at 10/11/21 1739    acetaminophen (TYLENOL) tablet 975 mg, 975 mg, Oral, Q8H [facility], John Mejia MD, 975 mg at 10/17/21 0534    aluminum-magnesium hydroxide-simethicone (MYLANTA) oral suspension 30 mL, 30 mL, Oral, Q6H PRN, John Mejia MD    atorvastatin (LIPITOR) tablet 40 mg, 40 mg, Oral, HS, John Mejia MD, 40 mg at 10/16/21 2140    calcitriol (ROCALTROL) capsule 0 25 mcg, 0 25 mcg, Oral, Daily, John Mejia MD, 0 25 mcg at 10/17/21 1142    cholecalciferol (VITAMIN D) oral liquid 800 Units, 800 Units, Oral, Daily, John Mejia MD, 800 Units at 10/17/21 1144    citalopram (CeleXA) tablet 20 mg, 20 mg, Oral, Daily, John Mejia MD, 20 mg at 10/17/21 1140    docusate sodium (COLACE) capsule 100 mg, 100 mg, Oral, BID, John Mejia MD, 100 mg at 10/17/21 1140    levothyroxine tablet 75 mcg, 75 mcg, Oral, Daily, John Mejia MD, 75 mcg at 10/17/21 1140    melatonin tablet 3 mg, 3 mg, Oral, HS, John Mejia MD, 3 mg at 10/16/21 2140    metoprolol (LOPRESSOR) injection 2 5 mg, 2 5 mg, Intravenous, Q6H PRN, Ilda Arambula PA-C, 2 5 mg at 10/15/21 0438    metoprolol tartrate (LOPRESSOR) tablet 25 mg, 25 mg, Oral, BID, Isela H DIMITRI Funes, 25 mg at 10/17/21 1140    ondansetron (ZOFRAN) injection 4 mg, 4 mg, Intravenous, Q6H PRN, Ny Portillo MD, 4 mg at 10/14/21 2153    piperacillin-tazobactam (ZOSYN) 2 25 g in sodium chloride 0 9 % 50 mL IVPB, 2 25 g, Intravenous, Q6H, Judi Cuenca MD, Last Rate: 100 mL/hr at 10/17/21 1141, 2 25 g at 10/17/21 1141    saccharomyces boulardii (FLORASTOR) capsule 250 mg, 250 mg, Oral, Daily, Ny Portillo MD, 250 mg at 10/17/21 1140    senna (SENOKOT) tablet 8 6 mg, 1 tablet, Oral, Daily, Ny Portillo MD, 8 6 mg at 10/17/21 1140    sodium bicarbonate 75 mEq in sodium chloride 0 45 % 1,000 mL infusion, 50 mL/hr, Intravenous, Continuous, Dallas Spencer DO, Last Rate: 50 mL/hr at 10/17/21 0226, 50 mL/hr at 10/17/21 0226    Invasive Devices:      Lab Results:   Results from last 7 days   Lab Units 10/16/21  0419 10/15/21  0450 10/14/21  0435 10/13/21  0517 10/12/21  0156 10/12/21  0145 10/11/21  1652 10/11/21  0337   WBC Thousand/uL 4 93 6 20  --  7 21  --  3 96*  --  4 85   HEMOGLOBIN g/dL 7 7* 8 6*  --  8 3*  --  9 3* 9 2* 6 9*   HEMATOCRIT % 25 7* 28 0*  --  26 3*  --  29 7* 29 0* 22 0*   PLATELETS Thousands/uL 148* 194  --  243  --  244  --  212   POTASSIUM mmol/L 4 1 4 2 3 8 3 6  --  3 3*  --  3 0*   CHLORIDE mmol/L 107 112* 114* 114*  --  115*  --  113*   CO2 mmol/L 18* 17* 19* 18*  --  15*  --  20*   BUN mg/dL 99* 86* 74* 65*  --  53*  --  51*   CREATININE mg/dL 4 99* 4 81* 4 56* 4 15*  --  3 63*  --  3 55*   CALCIUM mg/dL 8 3 8 0* 7 9* 7 9*  --  8 0*  --  7 9*   MAGNESIUM mg/dL 2 3 2 5 2 1 2 4  --  2 3  --  1 7   PHOSPHORUS mg/dL 5 8* 4 1 2 7 2 3  --  3 2  --  5 0*   ALK PHOS U/L 249*  --  185* 244*  --   --   --   --    ALT U/L 10*  --  11* 15  --   --   --   --    AST U/L 52*  --  80* 90*  --   --   --   --    BLOOD CULTURE   --   --   --   --  No Growth After 5 Days  No Growth After 5 Days    --   --   --          Portions of the record may have been created with voice recognition software  Occasional wrong word or "sound a like" substitutions may have occurred due to the inherent limitations of voice recognition software  Read the chart carefully and recognize, using context, where substitutions have occurred  If you have any questions, please contact the dictating provider

## 2021-10-18 PROBLEM — K92.1 MELENA: Status: ACTIVE | Noted: 2021-10-18

## 2021-10-18 LAB
ALBUMIN SERPL BCP-MCNC: 1.5 G/DL (ref 3.5–5)
ALP SERPL-CCNC: 210 U/L (ref 46–116)
ALT SERPL W P-5'-P-CCNC: 7 U/L (ref 12–78)
ANION GAP SERPL CALCULATED.3IONS-SCNC: 15 MMOL/L (ref 4–13)
ANISOCYTOSIS BLD QL SMEAR: PRESENT
ARTIFACT: PRESENT
AST SERPL W P-5'-P-CCNC: 39 U/L (ref 5–45)
BASOPHILS # BLD MANUAL: 0 THOUSAND/UL (ref 0–0.1)
BASOPHILS NFR MAR MANUAL: 0 % (ref 0–1)
BILIRUB SERPL-MCNC: 0.68 MG/DL (ref 0.2–1)
BUN SERPL-MCNC: 120 MG/DL (ref 5–25)
CALCIUM ALBUM COR SERPL-MCNC: 10.5 MG/DL (ref 8.3–10.1)
CALCIUM SERPL-MCNC: 8.5 MG/DL (ref 8.3–10.1)
CHLORIDE SERPL-SCNC: 107 MMOL/L (ref 100–108)
CO2 SERPL-SCNC: 17 MMOL/L (ref 21–32)
CREAT SERPL-MCNC: 5.83 MG/DL (ref 0.6–1.3)
EOSINOPHIL # BLD MANUAL: 0.2 THOUSAND/UL (ref 0–0.4)
EOSINOPHIL NFR BLD MANUAL: 2 % (ref 0–6)
ERYTHROCYTE [DISTWIDTH] IN BLOOD BY AUTOMATED COUNT: 18.9 % (ref 11.6–15.1)
ERYTHROCYTE [DISTWIDTH] IN BLOOD BY AUTOMATED COUNT: 19 % (ref 11.6–15.1)
GFR SERPL CREATININE-BSD FRML MDRD: 7 ML/MIN/1.73SQ M
GLUCOSE SERPL-MCNC: 83 MG/DL (ref 65–140)
HCT VFR BLD AUTO: 28.8 % (ref 34.8–46.1)
HCT VFR BLD AUTO: 29.5 % (ref 34.8–46.1)
HGB BLD-MCNC: 8.8 G/DL (ref 11.5–15.4)
HGB BLD-MCNC: 9 G/DL (ref 11.5–15.4)
INR PPP: 1.63 (ref 0.84–1.19)
LYMPHOCYTES # BLD AUTO: 1.08 THOUSAND/UL (ref 0.6–4.47)
LYMPHOCYTES # BLD AUTO: 11 % (ref 14–44)
MAGNESIUM SERPL-MCNC: 2.4 MG/DL (ref 1.6–2.6)
MCH RBC QN AUTO: 28.4 PG (ref 26.8–34.3)
MCH RBC QN AUTO: 28.5 PG (ref 26.8–34.3)
MCHC RBC AUTO-ENTMCNC: 30.5 G/DL (ref 31.4–37.4)
MCHC RBC AUTO-ENTMCNC: 30.6 G/DL (ref 31.4–37.4)
MCV RBC AUTO: 93 FL (ref 82–98)
MCV RBC AUTO: 93 FL (ref 82–98)
METAMYELOCYTES NFR BLD MANUAL: 1 % (ref 0–1)
MONOCYTES # BLD AUTO: 0.59 THOUSAND/UL (ref 0–1.22)
MONOCYTES NFR BLD: 6 % (ref 4–12)
NEUTROPHILS # BLD MANUAL: 7.79 THOUSAND/UL (ref 1.85–7.62)
NEUTS BAND NFR BLD MANUAL: 6 % (ref 0–8)
NEUTS HYPERSEG BLD QL SMEAR: PRESENT
NEUTS SEG NFR BLD AUTO: 73 % (ref 43–75)
NRBC BLD AUTO-RTO: 1 /100 WBCS
PHOSPHATE SERPL-MCNC: 7.1 MG/DL (ref 2.3–4.1)
PLATELET # BLD AUTO: 123 THOUSANDS/UL (ref 149–390)
PLATELET # BLD AUTO: 140 THOUSANDS/UL (ref 149–390)
PLATELET BLD QL SMEAR: ABNORMAL
PMV BLD AUTO: 11.6 FL (ref 8.9–12.7)
PMV BLD AUTO: 11.7 FL (ref 8.9–12.7)
POIKILOCYTOSIS BLD QL SMEAR: PRESENT
POLYCHROMASIA BLD QL SMEAR: PRESENT
POTASSIUM SERPL-SCNC: 3.9 MMOL/L (ref 3.5–5.3)
PROT SERPL-MCNC: 5 G/DL (ref 6.4–8.2)
PROTHROMBIN TIME: 18.6 SECONDS (ref 11.6–14.5)
RBC # BLD AUTO: 3.1 MILLION/UL (ref 3.81–5.12)
RBC # BLD AUTO: 3.16 MILLION/UL (ref 3.81–5.12)
RBC MORPH BLD: PRESENT
SODIUM SERPL-SCNC: 139 MMOL/L (ref 136–145)
VARIANT LYMPHS # BLD AUTO: 1 %
WBC # BLD AUTO: 8.01 THOUSAND/UL (ref 4.31–10.16)
WBC # BLD AUTO: 9.86 THOUSAND/UL (ref 4.31–10.16)

## 2021-10-18 PROCEDURE — 92610 EVALUATE SWALLOWING FUNCTION: CPT

## 2021-10-18 PROCEDURE — 85007 BL SMEAR W/DIFF WBC COUNT: CPT | Performed by: STUDENT IN AN ORGANIZED HEALTH CARE EDUCATION/TRAINING PROGRAM

## 2021-10-18 PROCEDURE — 85610 PROTHROMBIN TIME: CPT | Performed by: STUDENT IN AN ORGANIZED HEALTH CARE EDUCATION/TRAINING PROGRAM

## 2021-10-18 PROCEDURE — 80053 COMPREHEN METABOLIC PANEL: CPT | Performed by: INTERNAL MEDICINE

## 2021-10-18 PROCEDURE — 85027 COMPLETE CBC AUTOMATED: CPT | Performed by: INTERNAL MEDICINE

## 2021-10-18 PROCEDURE — 99222 1ST HOSP IP/OBS MODERATE 55: CPT | Performed by: INTERNAL MEDICINE

## 2021-10-18 PROCEDURE — C9113 INJ PANTOPRAZOLE SODIUM, VIA: HCPCS | Performed by: INTERNAL MEDICINE

## 2021-10-18 PROCEDURE — 83735 ASSAY OF MAGNESIUM: CPT | Performed by: INTERNAL MEDICINE

## 2021-10-18 PROCEDURE — 99232 SBSQ HOSP IP/OBS MODERATE 35: CPT | Performed by: INTERNAL MEDICINE

## 2021-10-18 PROCEDURE — 99233 SBSQ HOSP IP/OBS HIGH 50: CPT | Performed by: INTERNAL MEDICINE

## 2021-10-18 PROCEDURE — 85027 COMPLETE CBC AUTOMATED: CPT | Performed by: STUDENT IN AN ORGANIZED HEALTH CARE EDUCATION/TRAINING PROGRAM

## 2021-10-18 PROCEDURE — 97530 THERAPEUTIC ACTIVITIES: CPT

## 2021-10-18 PROCEDURE — 84100 ASSAY OF PHOSPHORUS: CPT | Performed by: INTERNAL MEDICINE

## 2021-10-18 RX ORDER — PANTOPRAZOLE SODIUM 40 MG/1
40 INJECTION, POWDER, FOR SOLUTION INTRAVENOUS EVERY 12 HOURS SCHEDULED
Status: DISCONTINUED | OUTPATIENT
Start: 2021-10-18 | End: 2021-10-22 | Stop reason: SDUPTHER

## 2021-10-18 RX ADMIN — PIPERACILLIN AND TAZOBACTAM 2.25 G: 36; 4.5 INJECTION, POWDER, FOR SOLUTION INTRAVENOUS at 02:40

## 2021-10-18 RX ADMIN — PANTOPRAZOLE SODIUM 40 MG: 40 INJECTION, POWDER, FOR SOLUTION INTRAVENOUS at 12:04

## 2021-10-18 RX ADMIN — PIPERACILLIN AND TAZOBACTAM 2.25 G: 36; 4.5 INJECTION, POWDER, FOR SOLUTION INTRAVENOUS at 21:59

## 2021-10-18 RX ADMIN — LEVOTHYROXINE SODIUM 75 MCG: 75 TABLET ORAL at 12:03

## 2021-10-18 RX ADMIN — PIPERACILLIN AND TAZOBACTAM 2.25 G: 36; 4.5 INJECTION, POWDER, FOR SOLUTION INTRAVENOUS at 12:04

## 2021-10-18 RX ADMIN — SODIUM BICARBONATE 50 ML/HR: 84 INJECTION, SOLUTION INTRAVENOUS at 21:53

## 2021-10-18 RX ADMIN — METOPROLOL TARTRATE 25 MG: 25 TABLET, FILM COATED ORAL at 12:03

## 2021-10-18 RX ADMIN — CALCITRIOL 0.25 MCG: 0.25 CAPSULE, LIQUID FILLED ORAL at 12:03

## 2021-10-18 RX ADMIN — Medication 250 MG: at 12:03

## 2021-10-18 RX ADMIN — ACETAMINOPHEN 975 MG: 325 TABLET, FILM COATED ORAL at 05:07

## 2021-10-18 RX ADMIN — PANTOPRAZOLE SODIUM 40 MG: 40 INJECTION, POWDER, FOR SOLUTION INTRAVENOUS at 21:59

## 2021-10-18 RX ADMIN — CITALOPRAM HYDROBROMIDE 20 MG: 20 TABLET ORAL at 12:03

## 2021-10-18 NOTE — PROGRESS NOTES
[facility]    Cardiology Progress Note  Bernarda Moody 77 y o  female   YOB: 1945 MRN: [id number]  Unit/Bed#: [facility] 929-01 Encounter: [id number]      Subjective:   No significant events overnight  Patient and unable to provide much history  Keep saying "I want to go home", and "I don't know" to most of my questions  No c/o chest pain, palpitations    Assessments  78-year-old female with chronic kidney disease, chronic pulmonary embolism, history of SDH, left renal hematoma, s/p thoracocentesis (10/11),  hypertension, history of obstructive uropathy with hydronephrosis and stent placement came in originally with complains abdominal pain and was found to renal hematoma / retroperitoneal bleed and sepsis  She was also noted to atrial fibrillation    Principal Problem:    Sepsis ([facility] )  Active Problems:    Chronic saddle pulmonary embolism (HCC)    Obstructive uropathy    Hypertension    Polycythemia vera ([facility] )    Anemia due to stage 5 chronic kidney disease, not on chronic dialysis ([facility])    Acute renal failure superimposed on stage 5 chronic kidney disease, not on chronic dialysis [facility])    Renal hematoma, left    Pleural effusion    Localized swelling of right upper extremity      Plan  Paroxysmal Atrial Fibrillation  · Currently NSR on clinical exam  · On metoprolol 25 mg b i d   · Previously on Eliquis 2 5 mg b i d  Chronic pulmonary embolism, currently due renal/retroperitoneal   Also has a history of subdural hematoma  · Resume anticoagulation when acceptable from surgical / bleeding standpoint    Will see p r n  Abebe Manifold Please call with questions or re-consult as needed    Review of Systems   All other systems reviewed and are negative  Telemetry Review: Not on telemetry    Objective:   Vitals: Blood pressure 91/61, pulse 103, temperature (!) 97 3 °F (36 3 °C), resp   rate 20, height 5' (1 524 m), weight 86 2 kg (190 lb), SpO2 93 %, not currently breastfeeding , Body mass index is 37 11 kg/m² ,   Orthostatic Blood Pressures      Most Recent Value   Blood Pressure  91/61 filed at 10/18/2021 4402         Systolic (20ANC), XCK:045 , Min:91 , ZON:495     Diastolic (82NAX), BYR:31, Min:58, Max:62    Wt Readings from Last 5 Encounters:   10/15/21 86 2 kg (190 lb)   09/30/21 83 9 kg (185 lb)   09/22/21 84 7 kg (186 lb 12 8 oz)   09/15/21 82 1 kg (181 lb)   07/30/21 88 kg (194 lb 0 1 oz)     I/O       10/16 0701 - 10/17 0700 10/17 0701 - 10/18 0700 10/18 0701 - 10/19 0700    P  O  420 0     IV Piggyback 100 150     Total Intake(mL/kg) 520 (6) 150 (1 7)     Urine (mL/kg/hr) 150 (0 1) 300 (0 1)     Drains 30  0    Stool 0 0     Total Output 180 300 0    Net +340 -150 0           Unmeasured Stool Occurrence 1 x 1 x               Physical Exam  Vitals and nursing note reviewed  Constitutional:       General: She is not in acute distress  Appearance: Normal appearance  She is well-developed  She is obese  She is not ill-appearing  HENT:      Head: Normocephalic and atraumatic  Nose: No congestion  Eyes:      General: No scleral icterus  Conjunctiva/sclera: Conjunctivae normal    Neck:      Vascular: No carotid bruit or JVD  Cardiovascular:      Rate and Rhythm: Normal rate and regular rhythm  Pulses: Normal pulses  Heart sounds: Normal heart sounds  No murmur heard  No friction rub  No gallop  Pulmonary:      Effort: Pulmonary effort is normal  No respiratory distress  Breath sounds: Normal breath sounds  No rales  Abdominal:      General: Abdomen is protuberant  There is no distension  Palpations: Abdomen is soft  Tenderness: There is no abdominal tenderness  Comments: Drain noted in place   Musculoskeletal:         General: Swelling present  No tenderness  Cervical back: Neck supple  Right lower leg: Edema present  Left lower leg: Edema present  Comments: Right arm swelling+   Skin:     General: Skin is warm  Neurological:      General: No focal deficit present  Mental Status: She is alert and oriented to person, place, and time  Mental status is at baseline  Motor: Weakness present  Psychiatric:         Mood and Affect: Mood normal          Behavior: Behavior normal          Thought Content:  Thought content normal          Laboratory Results: personally reviewed        CBC with diff:   Results from last 7 days   Lab Units 10/18/21  0614 10/17/21  1646 10/16/21  0419 10/15/21  0450 10/13/21  0517 10/12/21  0145 10/11/21  1652   WBC Thousand/uL 8 01 9 36 4 93 6 20 7 21 3 96*  --    HEMOGLOBIN g/dL 9 0* 8 9* 7 7* 8 6* 8 3* 9 3* 9 2*   HEMATOCRIT % 29 5* 28 8* 25 7* 28 0* 26 3* 29 7* 29 0*   MCV fL 93 92 95 93 91 92  --    PLATELETS Thousands/uL 140* 157 148* 194 243 244  --    MCH pg 28 5 28 4 28 5 28 5 28 7 28 9  --    MCHC g/dL 30 5* 30 9* 30 0* 30 7* 31 6 31 3*  --    RDW % 19 0* 19 0* 19 0* 18 6* 17 5* 17 1*  --    MPV fL 11 6 11 3 11 0 10 9 10 7 10 6  --    NRBC /100 WBC  --   --  1  --   --   --   --          CMP:  Results from last 7 days   Lab Units 10/18/21  0614 10/17/21  1646 10/16/21  0419 10/15/21  0450 10/14/21  0435 10/13/21  0517 10/12/21  0145   POTASSIUM mmol/L 3 9 4 1 4 1 4 2 3 8 3 6 3 3*   CHLORIDE mmol/L 107 107 107 112* 114* 114* 115*   CO2 mmol/L 17* 20* 18* 17* 19* 18* 15*   BUN mg/dL 120* 112* 99* 86* 74* 65* 53*   CREATININE mg/dL 5 83* 5 70* 4 99* 4 81* 4 56* 4 15* 3 63*   CALCIUM mg/dL 8 5 8 6 8 3 8 0* 7 9* 7 9* 8 0*   AST U/L 39  --  52*  --  80* 90*  --    ALT U/L 7*  --  10*  --  11* 15  --    ALK PHOS U/L 210*  --  249*  --  185* 244*  --    EGFR ml/min/1 73sq m 7 7 8 8 9 10 12         BMP:  Results from last 7 days   Lab Units 10/18/21  0614 10/17/21  1646 10/16/21  0419 10/15/21  0450 10/14/21  0435 10/13/21  0517 10/12/21  0145   POTASSIUM mmol/L 3 9 4 1 4 1 4 2 3 8 3 6 3 3*   CHLORIDE mmol/L 107 107 107 112* 114* 114* 115*   CO2 mmol/L 17* 20* 18* 17* 19* 18* 15*   BUN mg/dL 120* 112* 99* 86* 74* 65* 53*   CREATININE mg/dL 5 83* 5 70* 4 99* 4 81* 4 56* 4 15* 3 63*   CALCIUM mg/dL 8 5 8 6 8 3 8 0* 7 9* 7 9* 8 0*       BNP: No results for input(s): BNP in the last 72 hours      Magnesium:   Results from last 7 days   Lab Units 10/18/21  0614 10/16/21  0419 10/15/21  0450 10/14/21  0435 10/13/21  0517 10/12/21  0145   MAGNESIUM mg/dL 2 4 2 3 2 5 2 1 2 4 2 3       Coags:   Results from last 7 days   Lab Units 10/13/21  1712   PTT seconds 45*   INR  1 60*       TSH:        Hemoglobin A1C       Lipid Profile:       Meds/Allergies   all current active meds have been reviewed and current meds:   Current Facility-Administered Medications   Medication Dose Route Frequency    acetaminophen (TYLENOL) tablet 650 mg  650 mg Oral Q6H PRN    acetaminophen (TYLENOL) tablet 975 mg  975 mg Oral Q8H [address]    aluminum-magnesium hydroxide-simethicone (MYLANTA) oral suspension 30 mL  30 mL Oral Q6H PRN    atorvastatin (LIPITOR) tablet 40 mg  40 mg Oral HS    calcitriol (ROCALTROL) capsule 0 25 mcg  0 25 mcg Oral Daily    cholecalciferol (VITAMIN D) oral liquid 800 Units  800 Units Oral Daily    citalopram (CeleXA) tablet 20 mg  20 mg Oral Daily    docusate sodium (COLACE) capsule 100 mg  100 mg Oral BID    levothyroxine tablet 75 mcg  75 mcg Oral Daily    melatonin tablet 3 mg  3 mg Oral HS    metoprolol (LOPRESSOR) injection 2 5 mg  2 5 mg Intravenous Q6H PRN    metoprolol tartrate (LOPRESSOR) tablet 25 mg  25 mg Oral BID    ondansetron (ZOFRAN) injection 4 mg  4 mg Intravenous Q6H PRN    piperacillin-tazobactam (ZOSYN) 2 25 g in sodium chloride 0 9 % 50 mL IVPB  2 25 g Intravenous Q6H    saccharomyces boulardii (FLORASTOR) capsule 250 mg  250 mg Oral Daily    senna (SENOKOT) tablet 8 6 mg  1 tablet Oral Daily    sodium bicarbonate 75 mEq in sodium chloride 0 45 % 1,000 mL infusion  50 mL/hr Intravenous Continuous     Medications Prior to Admission   Medication    acetaminophen (TYLENOL) 325 mg tablet    apixaban (Eliquis) 2 5 mg    atorvastatin (LIPITOR) 40 mg tablet    calcitriol (ROCALTROL) 0 25 mcg capsule    Cholecalciferol (VITAMIN D3) 1000 UNITS CAPS    citalopram (CeleXA) 20 mg tablet    levothyroxine 75 mcg tablet    loperamide (IMODIUM) 2 mg capsule    melatonin 3 mg    metoprolol tartrate (LOPRESSOR) 25 mg tablet    saccharomyces boulardii (Florastor) 250 mg capsule    sodium bicarbonate 650 mg tablet     sodium bicarbonate infusion, 50 mL/hr, Last Rate: 50 mL/hr (10/17/21 2721)          Cardiac testing: reviewed  Results for orders placed during the hospital encounter of 20    Echo complete with contrast if indicated    Narrative  [address]  [phone]    Transthoracic Echocardiogram  2D, M-mode, Doppler, and Color Doppler    Study date:  09-Dec-2020    Patient: Sloane Rg  MR number: [id number]  Account number: [de-identified]  : [id number]  Age: 77 years  Gender: Female  Status: Outpatient  [facility] [address] [place] [address] [facility]  Height: 60 in  Weight: 190 lb  BP: 140/ 82 mmHg    Indications: Dyspnea    Diagnoses: R06 00 - Dyspnea, unspecified    Sonographer:  ANITHA Easton  Primary Physician:  Rosalia Cespedes MD  Referring Physician:  Lluvia Dobson MD  Group:  [facility]  Cardiology Fellow:  Juan Carlos Carroll DO  Interpreting Physician:  Lani Lizama MD    SUMMARY    LEFT VENTRICLE:  Systolic function was normal  Ejection fraction was estimated to be 55 %  There were no regional wall motion abnormalities  Wall thickness was mildly increased  There was mild concentric hypertrophy  Features were consistent with a pseudonormal left ventricular filling pattern, with concomitant abnormal relaxation and increased filling pressure (grade 2 diastolic dysfunction)  LEFT ATRIUM:  The atrium was mildly dilated      MITRAL VALVE:  There was mild annular calcification  There was trace regurgitation  AORTIC VALVE:  There was trace regurgitation  TRICUSPID VALVE:  There was mild regurgitation  PULMONIC VALVE:  There was trace regurgitation  HISTORY: PRIOR HISTORY: Hypertension, CVA, pulmonary embolus, chronic kidney disease    PROCEDURE: The study was performed in the [address] [facility] [place]  This was a routine study  The transthoracic approach was used  The study included complete 2D imaging, M-mode, complete spectral Doppler, and color Doppler  Image  quality was adequate  LEFT VENTRICLE: Size was normal  Systolic function was normal  Ejection fraction was estimated to be 55 %  There were no regional wall motion abnormalities  Wall thickness was mildly increased  There was mild concentric hypertrophy  DOPPLER: Features were consistent with a pseudonormal left ventricular filling pattern, with concomitant abnormal relaxation and increased filling pressure (grade 2 diastolic dysfunction)  RIGHT VENTRICLE: The size was normal  Systolic function was normal  Wall thickness was normal     LEFT ATRIUM: The atrium was mildly dilated  RIGHT ATRIUM: Size was normal     MITRAL VALVE: There was mild annular calcification  There was normal leaflet separation  DOPPLER: The transmitral velocity was within the normal range  There was no evidence for stenosis  There was trace regurgitation  AORTIC VALVE: The valve was trileaflet  Leaflets exhibited normal thickness and normal cuspal separation  DOPPLER: Transaortic velocity was within the normal range  There was no evidence for stenosis  There was trace regurgitation  TRICUSPID VALVE: The valve structure was normal  There was normal leaflet separation  DOPPLER: The transtricuspid velocity was within the normal range  There was no evidence for stenosis  There was mild regurgitation  PULMONIC VALVE: Leaflets exhibited normal thickness, no calcification, and normal cuspal separation   DOPPLER: The transpulmonic velocity was within the normal range  There was trace regurgitation  PERICARDIUM: There was no pericardial effusion  AORTA: The root exhibited normal size  SYSTEMIC VEINS: IVC: The inferior vena cava was normal in size and course  Respirophasic changes were normal     SYSTEM MEASUREMENT TABLES    2D  %FS: 38 74 %  Ao Diam: 2 99 cm  EDV(Teich): 111 14 ml  EF(Teich): 68 95 %  ESV(Teich): 34 51 ml  IVSd: 1 21 cm  LA Area: 15 57 cm2  LA Diam: 4 24 cm  LVEDV MOD A4C: 106 54 ml  LVEF MOD A4C: 66 33 %  LVESV MOD A4C: 35 87 ml  LVIDd: 4 87 cm  LVIDs: 2 98 cm  LVLd A4C: 7 47 cm  LVLs A4C: 6 2 cm  LVPWd: 1 07 cm  RA Area: 14 55 cm2  RVIDd: 3 05 cm  SV MOD A4C: 70 66 ml  SV(Teich): 76 63 ml    CW  TR Vmax: 2 5 m/s  TR maxP 04 mmHg    MM  TAPSE: 1 8 cm    PW  E' Sept: 0 06 m/s  E/E' Sept: 11 39  MV A Ramy: 0 76 m/s  MV Dec [place]: 3 76 m/s2  MV DecT: 192 5 ms  MV E Ramy: 0 72 m/s  MV E/A Ratio: 0 95  MV PHT: 55 83 ms  MVA By PHT: 3 94 cm2    [employer] Accredited Echocardiography Laboratory    Prepared and electronically signed by    Jb Frazier MD  Signed 09-Dec-2020 11:14:35    No results found for this or any previous visit  No results found for this or any previous visit  No results found for this or any previous visit

## 2021-10-18 NOTE — CONSULTS
Consultation - [facility]  Andrea Kong 77 y o  female MRN: [id number]  Unit/Bed#: [facility] 929-01 Encounter: [id number]        Inpatient consult to gastroenterology  Consult performed by: Paulie Barton MD  Consult ordered by: Blane Ma DO          Reason for Consult / Principal Problem:     Melena      ASSESSMENT AND PLAN:      Melena    76 y/o female with PMHx of HTN, CKD on HD, pulmonary embolism on Eliquis, polycythemia vera and obstructive uropathy with hydronephrosis s/p stent placement, admitted for hematoma of left kidney with sepsis s/p drainage x2 by IR  Patient has had 2 mucous black/tarry bowel movements  Her hemoglobin has fluctuate during this admission, dropping to 6 9 on 10/11 requiring 1U PRBC transfusion  Patient had a FOBT positive yesterday  Her hemoglobin this morning was 9 0  No previous EGD or colonoscopy were found on chart review  Giving her history, there is concern for GI bleeding  Differential diagnosis include bleeding ulcer, gastritis, colitis, malignancy  · Started PPI  · Monitor bowel movements   · Monitor hemoglobin, transfuse if <7  · Consider endoscopic evaluation when improvement in mental status  · Clear liquid diet    ______________________________________________________________________    HPI:  Patient is a 76 y/o female with PMHx of HTN, CKD on HD, pulmonary embolism on Eliquis, polycythemia vera and obstructive uropathy with hydronephrosis s/p stent placement and now s/p stent removal on 10/4 who presented on 10/9 with abdominal pain  Work up showed hematoma of left kidney with sepsis s/p drainage x2 by IR  During this admission, patient has been having continuous diarrhea  She has previous history of C diff infection on 2019  C diff studies were negative during this admission  Her hemoglobin has been labile during this admission, dropped from 9 3 on admission to 6 9 on 10/11, requiring a 1U PRBC transfusion   This morning, she had a odorous mucous black/tarry bowel movement  Per chart, she also had a similar BM yesterday and a FOBT positive  Patient has become very lethargic since yesterday which is not her baseline  She denies N/V and abdominal pain  Her hemoglobin this morning was 9 0  No previous EGD or colonoscopy were found on chart review  REVIEW OF SYSTEMS:    CONSTITUTIONAL: Denies any fever, chills, rigors, and weight loss  HEENT: No earache or tinnitus  Denies hearing loss or visual disturbances  CARDIOVASCULAR: No chest pain or palpitations  RESPIRATORY: Denies any cough, hemoptysis, shortness of breath or dyspnea on exertion  GASTROINTESTINAL: As noted in the History of Present Illness  GENITOURINARY: No problems with urination  Denies any hematuria or dysuria  NEUROLOGIC: No dizziness or vertigo, denies headaches  MUSCULOSKELETAL: Denies any muscle or joint pain  SKIN: Denies skin rashes or itching  ENDOCRINE: Denies excessive thirst  Denies intolerance to heat or cold  PSYCHOSOCIAL: Denies depression or anxiety  Denies any recent memory loss         Historical Information   Past Medical History:   Diagnosis Date    Anxiety     Bowel obstruction (HCC)     Chronic kidney disease     Chronic kidney disease (CKD), stage IV (severe) (HCC)     stage IV    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    Circulation problem     Compression fracture of cervical spine ([address] )     COVID-19 07/2021    hospitalized     Hernia of abdominal cavity     History of kidney problems     History of transfusion     Hydronephrosis     Hypertension     IBS (irritable bowel syndrome)     Incontinence     Lung mass     Improving on PET/CT 1/2016    Polycythemia vera ([address] )     Pulmonary embolism ([address] ) 2014    Shingles     Urinary tract infection      Past Surgical History:   Procedure Laterality Date    ABDOMINAL ADHESION SURGERY N/A 8/9/2020    Procedure: LYSIS ADHESIONS;  Surgeon: Mario Hernandez DO; [facility];  Service: General    BLADDER SUSPENSION      BOTOX INJECTION N/A 7/27/2016    Procedure: BOTOX INJECTION ;  Surgeon: Audrey Lomeli MD;  [facility];  Service:    [address], RADICAL WITH ILEOCONDUIT N/A 10/4/2016    Procedure: Lili Leopard WITH ILEAL CONDUIT ;  Surgeon: Audrey Lomeli MD;  [facility];  Service:    Rashida Lamyulissa CYSTOSCOPY W/ RETROGRADES Bilateral 7/27/2016    Procedure: Amy Lambert; RETROGRADE PYELOGRAM ;  Surgeon: Audrey Lomeli MD;  [facility];  Service:     HERNIA REPAIR      IR AV FISTULAGRAM/GRAFTOGRAM  2/10/2021    IR DRAINAGE TUBE PLACEMENT  10/13/2021    IR NEPHROSTOMY TO NEPHROURETERAL STENT  5/15/2021    IR NEPHROSTOMY TO NEPHROURETERAL STENT  6/9/2021    IR NEPHROSTOMY TUBE CHECK AND/OR REMOVAL  6/16/2021    IR NEPHROSTOMY TUBE CHECK/CHANGE/REPOSITION/REINSERTION/UPSIZE  5/14/2021    IR NEPHROSTOMY TUBE CHECK/CHANGE/REPOSITION/REINSERTION/UPSIZE  5/21/2021    IR NEPHROSTOMY TUBE CHECK/CHANGE/REPOSITION/REINSERTION/UPSIZE  9/16/2021    IR NEPHROSTOMY TUBE CHECK/CHANGE/REPOSITION/REINSERTION/UPSIZE  10/4/2021    IR NEPHROSTOMY TUBE PLACEMENT  5/10/2021    IR NEPHROSTOMY TUBE PLACEMENT  9/20/2021    IR NON-TUNNELED CENTRAL LINE PLACEMENT  7/17/2020    IR NON-TUNNELED CENTRAL LINE PLACEMENT  8/14/2020    IR NON-TUNNELED CENTRAL LINE PLACEMENT  7/16/2021    IR NON-TUNNELED CENTRAL LINE PLACEMENT  10/11/2021    IR THORACENTESIS  10/11/2021    LAPAROTOMY N/A 8/9/2020    Procedure: LAPAROTOMY EXPLORATORY, PARASTOMAL HERNIA REPAIR WITH MESH;  Surgeon: Garrett Mathur DO;  [facility];  Service: General    CA ANASTOMOSIS,AV,ANY SITE Right 1/5/2021    Procedure: Creation of right brachiobasilic fistula;   Surgeon: Amalia Chairez MD;  [facility];  Service: Vascular    CA COLONOSCOPY FLX DX W/COLLJ SPEC WHEN PFRMD N/A 8/31/2016    Procedure: COLONOSCOPY;  Surgeon: Dio Latham MD;  Location: [facility]; Service: Gastroenterology    IL CYSTOSCOPY,INSERT URETERAL STENT Bilateral 7/27/2016    Procedure: STENT INSERTION; EXCISION OF MESH ;  Surgeon: Alex Greene MD;  [facility];  Service: Urology    IL REVISE AV FISTULA,W/O THROMBECTOMY Right 9/30/2021    Procedure: Superficialization and transposition of right brachiobasilic fistula;   Surgeon: Kevyn Chairez MD;  [facility];  Service: Vascular    TONSILLECTOMY      TUBAL LIGATION      WISDOM TOOTH EXTRACTION       Social History   Social History     Substance and Sexual Activity   Alcohol Use Not Currently    Comment: n/s     Social History     Substance and Sexual Activity   Drug Use Not Currently    Comment: n/a     Social History     Tobacco Use   Smoking Status Never Smoker   Smokeless Tobacco Never Used   Tobacco Comment    n/a     Family History   Problem Relation Age of Onset    Cancer Mother         small cell cancer     Heart disease Father     COPD Father     Heart disease Brother     Nephrolithiasis Brother        Meds/Allergies     Medications Prior to Admission   Medication    acetaminophen (TYLENOL) 325 mg tablet    apixaban (Eliquis) 2 5 mg    atorvastatin (LIPITOR) 40 mg tablet    calcitriol (ROCALTROL) 0 25 mcg capsule    Cholecalciferol (VITAMIN D3) 1000 UNITS CAPS    citalopram (CeleXA) 20 mg tablet    levothyroxine 75 mcg tablet    loperamide (IMODIUM) 2 mg capsule    melatonin 3 mg    metoprolol tartrate (LOPRESSOR) 25 mg tablet    saccharomyces boulardii (Florastor) 250 mg capsule    sodium bicarbonate 650 mg tablet     Current Facility-Administered Medications   Medication Dose Route Frequency    acetaminophen (TYLENOL) tablet 650 mg  650 mg Oral Q6H PRN    aluminum-magnesium hydroxide-simethicone (MYLANTA) oral suspension 30 mL  30 mL Oral Q6H PRN    atorvastatin (LIPITOR) tablet 40 mg  40 mg Oral HS    calcitriol (ROCALTROL) capsule 0 25 mcg  0 25 mcg Oral Daily    cholecalciferol (VITAMIN D) oral liquid 800 Units  800 Units Oral Daily    citalopram (CeleXA) tablet 20 mg  20 mg Oral Daily    docusate sodium (COLACE) capsule 100 mg  100 mg Oral BID    levothyroxine tablet 75 mcg  75 mcg Oral Daily    melatonin tablet 3 mg  3 mg Oral HS    metoprolol (LOPRESSOR) injection 2 5 mg  2 5 mg Intravenous Q6H PRN    metoprolol tartrate (LOPRESSOR) tablet 25 mg  25 mg Oral BID    ondansetron (ZOFRAN) injection 4 mg  4 mg Intravenous Q6H PRN    pantoprazole (PROTONIX) injection 40 mg  40 mg Intravenous Q12H LONG    piperacillin-tazobactam (ZOSYN) 2 25 g in sodium chloride 0 9 % 50 mL IVPB  2 25 g Intravenous Q6H    saccharomyces boulardii (FLORASTOR) capsule 250 mg  250 mg Oral Daily    senna (SENOKOT) tablet 8 6 mg  1 tablet Oral Daily    sodium bicarbonate 75 mEq in sodium chloride 0 45 % 1,000 mL infusion  50 mL/hr Intravenous Continuous       Allergies   Allergen Reactions    Chlorhexidine Rash     petichi like rash when using chlorhexidine swabs prior to IV  Objective     Blood pressure 91/61, pulse 103, temperature (!) 97 3 °F (36 3 °C), resp  rate 20, height 5' (1 524 m), weight 86 2 kg (190 lb), SpO2 93 %, not currently breastfeeding  Body mass index is 37 11 kg/m²  Intake/Output Summary (Last 24 hours) at 10/18/2021 1408  Last data filed at 10/18/2021 1054  Gross per 24 hour   Intake 150 ml   Output 200 ml   Net -50 ml         PHYSICAL EXAM:      General Appearance:   Lethargic, non-arousable    HEENT:   Normocephalic, atraumatic, anicteric  Neck:  Supple, symmetrical, trachea midline   Lungs:   Clear to auscultation bilaterally; no rales, rhonchi or wheezing; respirations unlabored    Heart[de-identified]   Regular rate and rhythm; no murmur, rub, or gallop     Abdomen:   Soft, non-tender, non-distended; normal bowel sounds; no masses, no organomegaly    Genitalia:   Deferred    Rectal:   Deferred    Extremities:  No cyanosis, clubbing or edema Pulses:  2+ and symmetric all extremities    Skin:  No jaundice, rashes, or lesions    Lymph nodes:  No palpable cervical lymphadenopathy        Lab Results:   No results displayed because visit has over 200 results  Imaging Studies: I have personally reviewed pertinent imaging studies

## 2021-10-18 NOTE — PROGRESS NOTES
[address] [facility]  Progress Note - Malick  [birth year], 77 y o  female MRN: [id number]  Unit/Bed#: [facility] 929-01 Encounter: [id number]  Primary Care Provider: Reese Coats MD   Date and time admitted to hospital: 10/9/2021  2:19 PM    Melena  Assessment & Plan  Noted episode of melena this morning, will hold all anticoagulation, consult GI for colonoscopy    Oliguria  Assessment & Plan  Management as above    Chronic kidney disease  Assessment & Plan  Lab Results   Component Value Date    EGFR 7 10/18/2021    EGFR 7 10/17/2021    EGFR 8 10/16/2021    CREATININE 5 83 (H) 10/18/2021    CREATININE 5 70 (H) 10/17/2021    CREATININE 4 99 (H) 10/16/2021       Localized swelling of right upper extremity  Assessment & Plan  Pt presented with right upper extremity swelling  Vascular surgery consulted on admission  IR also consulted for fistulagram, plan to do fistulagram once patient is more stable  US doppler showed: There is a >50% stenosis noted in the proximal subclavian vein  There is a >50%  stenosis noted the proximal basilic vein, which also torturous  The dialysis AVF appears to be matured with adequate diameter, flow volumes and  less than 6mm in depth throughout the arm  Vascular and IR following-patient scheduled for IR fistulogram this afternoon  Will check repeat doppler    Pleural effusion  Assessment & Plan  Mild to moderate pleural effusion on CXR  S/p thoracentesis by IR 10/11/21   -patient currently comfortable on room air, chest x-ray from prior shows persistent left pleural effusion      Renal hematoma, left  Assessment & Plan    Left renal hematoma:   Presented with left renal hematoma  Pigtail catheter is noted medial to kidney  Renal pelvis may be collapsed  Hemorrhage and thickening extending in the combined interfascial place of retrospective as well as some high density fluid within  S/p IR drainage 10/12/21 with JOSE ANTONIO drain placement x 2     Output appears to be 30mL from both drains over 24 hour period  General surgery / urology do not plan for surgical intervention  Will need to further discuss when drain removal appropriate  Acute renal failure superimposed on stage 5 chronic kidney disease, not on chronic dialysis [facility])  Assessment & Plan  Lab Results   Component Value Date    EGFR 7 10/18/2021    EGFR 7 10/17/2021    EGFR 8 10/16/2021    CREATININE 5 83 (H) 10/18/2021    CREATININE 5 70 (H) 10/17/2021    CREATININE 4 99 (H) 10/16/2021   worsening RF  In the setting of sepsis  Discussed with son regarding poor prognosis  Nephrology following  No plan for urgent dialysis at this time, although could consider if renal function continues to decline  Patient is still currently agreeable for HD if needed   -discussed with Nephrology on 10/18/2021, with creatinine worsened to 5 83 today and poor urine output, may require initiation of dialysis this week, patient and family are agreeable    Anemia due to stage 5 chronic kidney disease, not on chronic dialysis [facility])  Assessment & Plan    Anemia 2/2 CKD with component of acute blood loss anemia  Hgb noted to be 10/11/21 Transfused 1  unit PRBCs on 10/12/21  -hemoglobin stable this morning at 9, however patient did have 2 episodes of melena this morning, will consult GI for further workup with endoscopy/colonoscopy, review record shows patient without any endoscopy or colonoscopy in the past      Polycythemia vera ([facility] [address] )  Assessment & Plan  Hx of Polycythemia vera and MDS  Significant anemia secondary to blood loss in the past    The patient is currently off of the [facility] treatment for her PV and getting mainly Aranesp on every 28 day basis    Hematology consulted for evaluation, recommend once patient is better/ stable, discharge, she will follow-up with Dr Breanna Reina, her primary hematologist     Hypertension  Assessment & Plan  Continue with metoprolol 25 mg bid  Continue cautiously with hold parameters in place if needed for low BP  Obstructive uropathy  Assessment & Plan  Obstructive nephropathy  Chronic bilateral hydronephrosis stents were recently removed  Urology and Nephrology following; recommend continuing antibiotics and present treatment      Chronic saddle pulmonary embolism ([facility] [address] )  Assessment & Plan  Chronic saddle pulmonary embolus  Eliquis 2 5 mg bid at home, currently on hold for now in the setting of anemia, perinephric fluid collection / hematoma  -continuing to hold Eliquis as patient had 2 episodes of melena this morning    * Sepsis ([facility] [address] )  Assessment & Plan  Sepsis POA  Poor source control with abscess/loculated collection of L perinephric region seen on CT scan on admjission  S/p IR drainage 10/13/21  Body fluid culture updated 10/16 is showing 4+ growth of both enterobacter aerogenes and enterococcus avium  C/w abx as per ID - on IV zosyn currently  May need to consider adjusting antibiotics based on final culture / sensitivity results  Overall poor prognosis with multiple co morbidities  Hopefully antibiotics and source control will improve her overall clinical picture              VTE Pharmacologic Prophylaxis: VTE Score: 13 High Risk (Score >/= 5) - Pharmacological DVT Prophylaxis Contraindicated  Sequential Compression Devices Ordered  Patient Centered Rounds: I performed bedside rounds with nursing staff today  Discussions with Specialists or Other Care Team Provider: cardiology, nephrology    Education and Discussions with Family / Patient: Updated  (daughter) via phone  Time Spent for Care: 30 minutes  More than 50% of total time spent on counseling and coordination of care as described above      Current Length of Stay: 9 day(s)  Current Patient Status: Inpatient   Certification Statement: The patient will continue to require additional inpatient hospital stay due to iv abx, GI workup for melena, kidney failure  Discharge Plan: pending progress    Code Status: Level 1 - Full Code    Subjective:   Patient is alert to self and place on exam but not to time or general situation  Lethargic one xam but arousable to verbal stimuli  No specific complaints but is not very interactive on questioning  Had a melenic bowel movement this morning which I witnessed  Objective:     Vitals:   Temp (24hrs), Av 4 °F (36 3 °C), Min:97 3 °F (36 3 °C), Max:97 4 °F (36 3 °C)    Temp:  [97 3 °F (36 3 °C)-97 4 °F (36 3 °C)] 97 3 °F (36 3 °C)  HR:  [] 103  Resp:  [20] 20  BP: ()/(60-62) 91/61  SpO2:  [92 %-93 %] 93 %  Body mass index is 37 11 kg/m²  Input and Output Summary (last 24 hours): Intake/Output Summary (Last 24 hours) at 10/18/2021 1459  Last data filed at 10/18/2021 1300  Gross per 24 hour   Intake 150 ml   Output 200 ml   Net -50 ml       Physical Exam:   Physical Exam  Vitals and nursing note reviewed  Constitutional:       General: She is not in acute distress  Appearance: She is well-developed  She is ill-appearing  She is not toxic-appearing or diaphoretic  HENT:      Head: Normocephalic and atraumatic  Eyes:      General: No scleral icterus  Conjunctiva/sclera: Conjunctivae normal    Cardiovascular:      Rate and Rhythm: Normal rate and regular rhythm  Heart sounds: No murmur heard  No friction rub  No gallop  Pulmonary:      Effort: Pulmonary effort is normal  No respiratory distress  Breath sounds: Normal breath sounds  No stridor  No wheezing, rhonchi or rales  Chest:      Chest wall: No tenderness  Abdominal:      General: There is no distension  Palpations: Abdomen is soft  There is no mass  Tenderness: There is no abdominal tenderness  There is no right CVA tenderness, left CVA tenderness, guarding or rebound  Hernia: No hernia is present  Genitourinary:     Comments: Left flank drain without output    Musculoskeletal:         General: Swelling present  No tenderness  Cervical back: Neck supple  Right lower leg: Edema present  Left lower leg: Edema present  Comments: Severe swelling in rue, fistula with palpable thrill   Skin:     General: Skin is warm and dry  Coloration: Skin is not jaundiced or pale  Findings: No bruising, erythema or rash  Neurological:      Mental Status: She is alert  Additional Data:     Labs:  Results from last 7 days   Lab Units 10/18/21  0614 10/16/21  0419   WBC Thousand/uL 8 01 4 93   HEMOGLOBIN g/dL 9 0* 7 7*   HEMATOCRIT % 29 5* 25 7*   PLATELETS Thousands/uL 140* 148*   BANDS PCT %  --  1   LYMPHO PCT %  --  7*   MONO PCT %  --  4   EOS PCT %  --  2     Results from last 7 days   Lab Units 10/18/21  0614   SODIUM mmol/L 139   POTASSIUM mmol/L 3 9   CHLORIDE mmol/L 107   CO2 mmol/L 17*   BUN mg/dL 120*   CREATININE mg/dL 5 83*   ANION GAP mmol/L 15*   CALCIUM mg/dL 8 5   ALBUMIN g/dL 1 5*   TOTAL BILIRUBIN mg/dL 0 68   ALK PHOS U/L 210*   ALT U/L 7*   AST U/L 39   GLUCOSE RANDOM mg/dL 83     Results from last 7 days   Lab Units 10/13/21  1712   INR  1 60*             Results from last 7 days   Lab Units 10/16/21  0419 10/13/21  1539   LACTIC ACID mmol/L 1 1 1 8       Lines/Drains:  Invasive Devices     Peripherally Inserted Central Catheter Line            PICC Line 10/11/21 6 days          Line            Hemodialysis AV Fistula 09/30/21 Right Upper arm 18 days          Drain            Urostomy Ileal conduit RUQ 1839 days    Closed/Suction Drain Left Other (Comment) 4 days    Closed/Suction Drain Left Other (Comment) 10 Fr  4 days                Central Line:  Goal for removal: n/a             Imaging: Reviewed radiology reports from this admission including: chest xray    Recent Cultures (last 7 days):   Results from last 7 days   Lab Units 10/13/21  1928 10/13/21  1923 10/12/21  0156 10/11/21  1851   BLOOD CULTURE   --   --  No Growth After 5 Days  No Growth After 5 Days    --    GRAM STAIN RESULT  1+ Polys* 2+ Gram positive cocci in pairs*  2+ Gram positive rods*  1+ Gram negative rods* No Polys or Bacteria seen  --  1+ Polys  No organisms seen   BODY FLUID CULTURE, STERILE  4+ Growth of Enterobacter aerogenes*  4+ Growth of Enterococcus avium* 4+ Growth of Enterobacter aerogenes*  4+ Growth of Enterococcus avium*  --  No growth       Last 24 Hours Medication List:   Current Facility-Administered Medications   Medication Dose Route Frequency Provider Last Rate    acetaminophen  650 mg Oral Q6H PRN Rosa Maria Garcia MD      aluminum-magnesium hydroxide-simethicone  30 mL Oral Q6H PRN Rosa Maria Garcia MD      atorvastatin  40 mg Oral HS Rosa Maria Garcia MD      calcitriol  0 25 mcg Oral Daily Rosa Maria Garcia MD      cholecalciferol  800 Units Oral Daily Rosa Maria Garcia MD      citalopram  20 mg Oral Daily Rosa Maria Garcia MD      docusate sodium  100 mg Oral BID Rosa Maria Garcia MD      levothyroxine  75 mcg Oral Daily Rosa Maria Garcia MD      melatonin  3 mg Oral HS Rosa Maria Garcia MD      metoprolol  2 5 mg Intravenous Q6H PRN Dior Gomes PA-C      metoprolol tartrate  25 mg Oral BID Dior Gomes PA-C      ondansetron  4 mg Intravenous Q6H PRN Rosa Maria Garcia MD      pantoprazole  40 mg Intravenous Q12H [facility] Mumtaz Laguerre DO      piperacillin-tazobactam  2 25 g Intravenous Q6H [place] Pereira MD 2 25 g (10/18/21 1204)    saccharomyces boulardii  250 mg Oral Daily Rosa Maria Garcia MD      senna  1 tablet Oral Daily Rosa Maria Garcia MD      sodium bicarbonate infusion  50 mL/hr Intravenous Continuous Alease Kussmaul, DO 50 mL/hr (10/17/21 6255)        Today, Patient Was Seen By: Maite Fitzgerald DO    **Please Note: This note may have been constructed using a voice recognition system  **

## 2021-10-18 NOTE — ASSESSMENT & PLAN NOTE
Anemia 2/2 CKD with component of acute blood loss anemia  Hgb noted to be 10/11/21 Transfused 1  unit PRBCs on 10/12/21     -hemoglobin stable this morning at 9, however patient did have 2 episodes of melena this morning, will consult GI for further workup with endoscopy/colonoscopy, review record shows patient without any endoscopy or colonoscopy in the past

## 2021-10-18 NOTE — ASSESSMENT & PLAN NOTE
Hx of Polycythemia vera and MDS  Significant anemia secondary to blood loss in the past    The patient is currently off of the [facility] treatment for her PV and getting mainly Aranesp on every 28 day basis    Hematology consulted for evaluation, recommend once patient is better/ stable, discharge, she will follow-up with Dr Mike Lozada, her primary hematologist

## 2021-10-18 NOTE — PLAN OF CARE
Problem: PHYSICAL THERAPY ADULT  Goal: Performs mobility at highest level of function for planned discharge setting  See evaluation for individualized goals  Description: Treatment/Interventions: Patient/family training, LE strengthening/ROM, Bed mobility, Spoke to nursing, Spoke to case management, OT  Equipment Recommended:  (TBD )       See flowsheet documentation for full assessment, interventions and recommendations  Outcome: Not Progressing  Note: Prognosis: Fair  Problem List: Decreased strength, Decreased endurance, Impaired balance, Decreased mobility  Assessment: PT INITIATED TREATMENT SESSION IN ORDER TO ASSIST PATIENT IN ACHIEVING GOALS TO IMPROVE OVERALL ACTIVITY TOLERANCE AND LE STRENGTH  PATIENT PARTICIPATED IN BED LEVEL ACTIVE AND ACTIVE ASSISTED B/L LE THERE-EX IN ORDER TO IMPROVE LE STRENGTH AND  IMPROVE ABILITY TO PERFORM TRANSFER AND AMBULATION  PATIENT DEMONSTRATED FAIR TOLERANCE FOR THERE-EX, PERFORMING REPS TO FATIGUE AND WITHOUT  PAIN  FURTHER MOBILITY LIMITED BY GROSS FATIGUE AT THIS TIME  PT D/C RECOMMENDATIONS REMAINS FOR REHAB  PATIENT WILL BENEFIT FROM CONTINUED SKILLED PT THIS ADMISSION TO ACHIEVE MAXIMAL FUNCTION AND SAFETY  Barriers to Discharge: Inaccessible home environment, Decreased caregiver support        PT Discharge Recommendation: Post acute rehabilitation services     PT - OK to Discharge: Yes (TO REHAB WHEN MED BRODERICK )    See flowsheet documentation for full assessment

## 2021-10-18 NOTE — ASSESSMENT & PLAN NOTE
Chronic saddle pulmonary embolus  Eliquis 2 5 mg bid at home, currently on hold for now in the setting of anemia, perinephric fluid collection / hematoma     -continuing to hold Eliquis as patient had 2 episodes of melena this morning

## 2021-10-18 NOTE — UTILIZATION REVIEW
Continued Stay Review    Date: 10/18/21                         Current Patient Class: Inpatient Current Level of Care: Med Surg    HPI:76 y o  female  with PMHx of CKD, HTN, hyperparathyroidism, anemia, pulmonary emboli, b/l hydronephrosis 2/2 obstructive uropathy and nonfunctional bladder s/p supratrigonal cystectomy and ileal conduit in October 2016, s/p nephrostomy tube placement in May 2021, s/p left percutaneous retrograde percutaneous nephrostomy tube placement in September 2021 who was initially admitted 10/9 with abdominal pain and concern for hematoma  IR performed drainage of left flank collection and left perinephric collection with drain placement on 10/13      10/18 Infectious Disease: Status post IR drainage and culture collection x2 on 10/13 with both the perinephric and paracolic drainage sites growing enterobacter aerogenes and enterococcus avium susceptible to Zosyn  Throughout the weekend, the patient has continued to progressively decline with worsening fatigue and weakness  Given significant comorbidities, would strongly advise goals of care discussion  Continue Zosyn, monitor WBC and trend fever curve, repeat labs tomorrow - CBC w/ diff and BMP, goals of care discussion with patient and family, monitor abdominal/flank exam with low threshold for re-imaging  Nephrology: Reagan Spencer has acute renal failure with baseline creatinine reportedly around 3 5 and it appears that the acute insult worsening kidney function began around 10/13/2021  The patient on that day and had a CT scan with no IV contrast was found to have retroperitoneal hematoma sepsis and drainage was performed  Recorded urine output over the last 24 hr was only 300 cc so is oliguric  Creatinine continues to slowly increase  Unable to really converse with the patient about dialysis but will continue to assess  Continue IV fluids with bicarbonate  Consider diuretic infusion if remains oliguric   Speech: Pt presented with s/s suggestive of mild oral and suspected WFL and minimal pharyngeal dysphagia  Symptoms or concerns included decreased mastication and decreased bolus formation  She states she is fatigued & not hungry  There is a prolonged oral processing but no overt wet voicing or coughing w/ any material today  Recommended diet: mechanically altered/level 2 diet and thin liquids          Vital Signs:     Date/Time  Temp  Pulse  Resp  BP  MAP (mmHg)  SpO2  O2 Device   10/18/21 02:44:21  97 3 °F (36 3 °C)Abnormal   103  20  91/61  71  93 %  --   10/18/21 0018  --  --  --  --  --  --  None (Room air)   10/17/21 19:07:33  --  79  20  98/60  73  92 %  --   10/17/21 16:47:06  97 4 °F (36 3 °C)Abnormal   76  20  102/62  75  92 %  --   10/17/21 1330  --  --  --  --  --  --  None (Room air)   10/17/21 11:14:52  97 4 °F (36 3 °C)Abnormal   93  18  114/58  77  93 %  --   10/17/21 07:29:32  97 3 °F (36 3 °C)Abnormal   89  18  115/60  78  92 %  --   10/16/21 2032  --  --  --  --  --  93 %  None (Room air)   10/16/21 15:54:20  97 8 °F (36 6 °C)  79  20  97/52  67  93 %  --   10/16/21 09:44:51  96 8 °F (36 °C)Abnormal   85  --  106/58  74  93 %  --   10/16/21 05:41:57  --  88  --  89/47Abnormal   61  93 %  --   10/16/21 0411  --  --  --  82/52Abnormal   --  --  --   10/16/21 03:13:41  --  123Abnormal   --  79/54Abnormal   62  92 %  --           Pertinent Labs/Diagnostic Results:       10/13 Interventional Radiology:    Procedure:   IR DRAINAGE x2     Preoperative diagnosis:   1  Renal hematoma    2  Sepsis ([address] )    3  Pleural effusion    4  Iron deficiency anemia due to chronic blood loss    5  Intra-abdominal collection          Postoperative diagnosis: Same      Specimens:      Left flank collection Culture aerobic and anaerobic    Left perinephric collection Culture aerobic and anaerobic       Findings:   Ten Palestinian drain placed into left perinephric collection  Thin bloody fluid     Ten Palestinian drain placed into left flank collection  Thin bloody fluid         Results from last 7 days   Lab Units 10/18/21  0614 10/17/21  1646 10/16/21  0419 10/15/21  0450 10/13/21  0517   WBC Thousand/uL 8 01 9 36 4 93 6 20 7 21   HEMOGLOBIN g/dL 9 0* 8 9* 7 7* 8 6* 8 3*   HEMATOCRIT % 29 5* 28 8* 25 7* 28 0* 26 3*   PLATELETS Thousands/uL 140* 157 148* 194 243   BANDS PCT %  --   --  1  --   --          Results from last 7 days   Lab Units 10/18/21  0614 10/17/21  1646 10/16/21  0419 10/15/21  0450 10/14/21  0435 10/13/21  0517   SODIUM mmol/L 139 139 140 141 142 142   POTASSIUM mmol/L 3 9 4 1 4 1 4 2 3 8 3 6   CHLORIDE mmol/L 107 107 107 112* 114* 114*   CO2 mmol/L 17* 20* 18* 17* 19* 18*   ANION GAP mmol/L 15* 12 15* 12 9 10   BUN mg/dL 120* 112* 99* 86* 74* 65*   CREATININE mg/dL 5 83* 5 70* 4 99* 4 81* 4 56* 4 15*   EGFR ml/min/1 73sq m 7 7 8 8 9 10   CALCIUM mg/dL 8 5 8 6 8 3 8 0* 7 9* 7 9*   MAGNESIUM mg/dL 2 4  --  2 3 2 5 2 1 2 4   PHOSPHORUS mg/dL 7 1*  --  5 8* 4 1 2 7 2 3     Results from last 7 days   Lab Units 10/18/21  0614 10/16/21  0419 10/14/21  0435 10/13/21  0517 10/12/21  0145   AST U/L 39 52* 80* 90*  --    ALT U/L 7* 10* 11* 15  --    ALK PHOS U/L 210* 249* 185* 244*  --    TOTAL PROTEIN g/dL 5 0* 4 9* 5 3* 5 8*  --    ALBUMIN g/dL 1 5* 1 8* 1 8* 2 0* 2 2*   TOTAL BILIRUBIN mg/dL 0 68 0 74 1 21* 1 15*  --          Results from last 7 days   Lab Units 10/18/21  0614 10/17/21  1646 10/16/21  0419 10/15/21  0450 10/14/21  0435 10/13/21  0517 10/12/21  0145   GLUCOSE RANDOM mg/dL 83 112 99 105 89 158* 66           Results from last 7 days   Lab Units 10/13/21  1712   PROTIME seconds 18 3*   INR  1 60*   PTT seconds 45*     Results from last 7 days   Lab Units 10/15/21  0450   TSH 3RD GENERATON uIU/mL 11 500*         Results from last 7 days   Lab Units 10/16/21  0419 10/13/21  1539   LACTIC ACID mmol/L 1 1 1 8         Results from last 7 days   Lab Units 10/13/21  1928 10/13/21  1923 10/12/21  0156 10/11/21  1851   BLOOD CULTURE   --   --  No Growth After 5 Days  No Growth After 5 Days  --    GRAM STAIN RESULT  1+ Polys*  2+ Gram positive cocci in pairs*  2+ Gram positive rods*  1+ Gram negative rods* No Polys or Bacteria seen  --  1+ Polys  No organisms seen   BODY FLUID CULTURE, STERILE  4+ Growth of Enterobacter aerogenes*  4+ Growth of Enterococcus avium* 4+ Growth of Enterobacter aerogenes*  4+ Growth of Enterococcus avium*  --  No growth     Results from last 7 days   Lab Units 10/11/21  1851   TOTAL COUNTED  100   WBC FLUID /ul 10,056             Medications:   Scheduled Medications:      atorvastatin, 40 mg, Oral, HS  calcitriol, 0 25 mcg, Oral, Daily  cholecalciferol, 800 Units, Oral, Daily  citalopram, 20 mg, Oral, Daily  docusate sodium, 100 mg, Oral, BID  levothyroxine, 75 mcg, Oral, Daily  melatonin, 3 mg, Oral, HS  metoprolol tartrate, 25 mg, Oral, BID  pantoprazole, 40 mg, Intravenous, Q12H LONG  piperacillin-tazobactam, 2 25 g, Intravenous, Q6H  saccharomyces boulardii, 250 mg, Oral, Daily  senna, 1 tablet, Oral, Daily      Continuous IV Infusions:      sodium bicarbonate infusion, 50 mL/hr, Intravenous, Continuous      PRN Meds:      acetaminophen, 650 mg, Oral, Q6H PRN  aluminum-magnesium hydroxide-simethicone, 30 mL, Oral, Q6H PRN  metoprolol, 2 5 mg, Intravenous, Q6H PRN  ondansetron, 4 mg, Intravenous, Q6H PRN        Discharge Plan: D        Network Utilization Review Department  ATTENTION: Please call with any questions or concerns to [phone] and carefully listen to the prompts so that you are directed to the right person  All voicemails are confidential   Karen Shearer all requests for admission clinical reviews, approved or denied determinations and any other requests to dedicated fax number below belonging to the campus where the patient is receiving treatment   List of dedicated fax numbers for the Facilities:  [address] [place] [address] DENIALS (Administrative/Medical Necessity) [phone]   [address] [place] [address] (Maternity/NICU/Pediatrics) 261 [facility],7Th Floor [facility] 40 125 [facility]  [address] [address] [place] [address] [address] [facility] [address] [facility] [phone]

## 2021-10-18 NOTE — SPEECH THERAPY NOTE
Speech Language/Pathology  Speech-Language Pathology Bedside Swallow Evaluation      Patient Name: Ralph Drake    [id number] Date: 10/18/2021     Problem List  Principal Problem:    Sepsis ([facility] )  Active Problems:    Chronic saddle pulmonary embolism ([facility] )    Obstructive uropathy    Hypertension    Polycythemia vera ([facility] )    Anemia due to stage 5 chronic kidney disease, not on chronic dialysis (HCC)    Acute renal failure superimposed on stage 5 chronic kidney disease, not on chronic dialysis (HCC)    Renal hematoma, left    Pleural effusion    Localized swelling of right upper extremity      Past Medical History  Past Medical History:   Diagnosis Date    Anxiety     Bowel obstruction (HCC)     Chronic kidney disease     Chronic kidney disease (CKD), stage IV (severe) ([facility])     stage IV    Chronic thrombosis of subclavian vein ([facility])     right    Circulation problem     Compression fracture of cervical spine ([facility] )     COVID-19 07/2021    hospitalized     Hernia of abdominal cavity     History of kidney problems     History of transfusion     Hydronephrosis     Hypertension     IBS (irritable bowel syndrome)     Incontinence     Lung mass     Improving on PET/CT 1/2016    Polycythemia vera ([facility] )     Pulmonary embolism ([facility] ) 2014    Shingles     Urinary tract infection        Past Surgical History  Past Surgical History:   Procedure Laterality Date    ABDOMINAL ADHESION SURGERY N/A 8/9/2020    Procedure: LYSIS ADHESIONS;  Surgeon: Marlys Montiel DO;  [facility];  Service: General    BLADDER SUSPENSION      BOTOX INJECTION N/A 7/27/2016    Procedure: BOTOX INJECTION ;  Surgeon: Judith Aviles MD;  [facility];  Service:     CHOLECYSTECTOMY N/A     COLONOSCOPY      CYSTECTOMY, RADICAL WITH ILEOCONDUIT N/A 10/4/2016    Procedure: Jerene Light ;  Surgeon: Judith Aviles MD;  [facility];  Service:    [place] CYSTOSCOPY W/ RETROGRADES Bilateral 7/27/2016    Procedure: CYSTOSCOPY; RETROGRADE PYELOGRAM ;  Surgeon: Marta Gracia MD;  [facility];  Service:     HERNIA REPAIR      IR AV FISTULAGRAM/GRAFTOGRAM  2/10/2021    IR DRAINAGE TUBE PLACEMENT  10/13/2021    IR NEPHROSTOMY TO NEPHROURETERAL STENT  5/15/2021    IR NEPHROSTOMY TO NEPHROURETERAL STENT  6/9/2021    IR NEPHROSTOMY TUBE CHECK AND/OR REMOVAL  6/16/2021    IR NEPHROSTOMY TUBE CHECK/CHANGE/REPOSITION/REINSERTION/UPSIZE  5/14/2021    IR NEPHROSTOMY TUBE CHECK/CHANGE/REPOSITION/REINSERTION/UPSIZE  5/21/2021    IR NEPHROSTOMY TUBE CHECK/CHANGE/REPOSITION/REINSERTION/UPSIZE  9/16/2021    IR NEPHROSTOMY TUBE CHECK/CHANGE/REPOSITION/REINSERTION/UPSIZE  10/4/2021    IR NEPHROSTOMY TUBE PLACEMENT  5/10/2021    IR NEPHROSTOMY TUBE PLACEMENT  9/20/2021    IR NON-TUNNELED CENTRAL LINE PLACEMENT  7/17/2020    IR NON-TUNNELED CENTRAL LINE PLACEMENT  8/14/2020    IR NON-TUNNELED CENTRAL LINE PLACEMENT  7/16/2021    IR NON-TUNNELED CENTRAL LINE PLACEMENT  10/11/2021    IR THORACENTESIS  10/11/2021    LAPAROTOMY N/A 8/9/2020    Procedure: LAPAROTOMY EXPLORATORY, PARASTOMAL HERNIA REPAIR WITH MESH;  Surgeon: Charo Vargas DO;  [facility];  Service: General    AL ANASTOMOSIS,AV,ANY SITE Right 1/5/2021    Procedure: Creation of right brachiobasilic fistula; Surgeon: Rosemary Chairez MD;  [facility];  Service: Vascular    AL COLONOSCOPY FLX DX W/COLLJ SPEC WHEN PFRMD N/A 8/31/2016    Procedure: COLONOSCOPY;  Surgeon: Willard Ulrich MD;  [facility]; Service: Gastroenterology    AL CYSTOSCOPY,INSERT URETERAL STENT Bilateral 7/27/2016    Procedure: STENT INSERTION; EXCISION OF MESH ;  Surgeon: Marta Gracia MD;  [facility];  Service: Urology    AL REVISE AV FISTULA,W/O THROMBECTOMY Right 9/30/2021    Procedure: Superficialization and transposition of right brachiobasilic fistula;   Surgeon: Leo Chairez MD;  [facility]; Service: Vascular    TONSILLECTOMY      TUBAL LIGATION      WISDOM TOOTH EXTRACTION         Summary   Pt presented with s/s suggestive of mild oral and suspected WFL and minimal pharyngeal dysphagia  Symptoms or concerns included decreased mastication and decreased bolus formation  She states she is fatigued & not hungry  There is a prolonged oral processing but no overt wet voicing or coughing w/ any material today  Risk/s for Aspiration: min     Recommended Diet: mechanically altered/level 2 diet and thin liquids   Recommended Form of Meds: whole with puree   Aspiration precautions and swallowing strategies: upright posture, only feed when fully alert and alternating bites and sips  Other Recommendations: Continue frequent oral care, will follow  If continued reduced manipulation will downgrade to puree        Current Medical Status  Pt is a 77 y o  female who presented [facility] with left renal hematoma  Pigtail catheter is noted medial to kidney  Renal pelvis may be collapsed  Hemorrhage and thickening extending in the combined interfascial place of retrospective as well as some high density fluid within  Presented with temp 102 4, tachycardia, tachypnea and elevated pro calcitonin possibly suggesting urinary source of infection  Urine has large blood, leukocytes and neg for nitrates   Now w/ imaging showing ground-glass opacities in the upper left lobe concerning for pneumonia versus atelectasis  Has undergone thoracentesis for pleural effusion  Current Precautions:  Fall   Contact      Allergies:  No known food allergies    Past medical history:  Please see H&P for details    Special Studies:  CXR- 10/17/2021Persistent left pleural disease      Social/Education/Vocational Hx:  Pt lives with family    Swallow Information   Current Risks for Dysphagia & Aspiration: increased fatigue  Current Symptoms/Concerns: poor intake, ?able decline in pulm status  Current Diet: soft/level 3 diet and thin liquids   Baseline Diet: regular diet and thin liquids      Baseline Assessment   Behavior/Cognition: alert and lethargic  Speech/Language Status: able to participate in basic conversation  Patient Positioning: upright in bed  Pain Status/Interventions/Response to Interventions:   No report of or nonverbal indications of pain  Swallow Mechanism Exam  Facial: masked facies  Labial: decreased strength  Lingual: bilateral decreased strength  Velum: unable to visualize  Mandible: adequate ROM  Dentition: edentulous  Vocal quality:clear/adequate and weak   Volitional Cough: unable to initiate volitional cough   Respiratory Status: on NC sats 95% to begin      Consistencies Assessed and Performance   Consistencies Administered: thin liquids, puree, mechanical soft solids and soft solids  Materials administered included muffin, banana, coffee, juice by cup/straw- declined eggs, peaches on tray    Oral Stage: mild, decreased bolus propulsion and decreased mastication  Mastication was mildly prolonged with the materials administered today  Bolus formation and transfer were functional though slow with no significant oral residue noted  No overt s/s reduced oral control  Pharyngeal Stage: suspected and minimal  Swallow Mechanics:  Swallowing initiation appeared prompt  Laryngeal rise was difficult to palpate and judged to be within functional limits  No coughing, throat clearing, change in vocal quality or respiratory status noted today       Esophageal Concerns: none reported    Strategies and Efficacy: alternat w/ sips thin     Summary and Recommendations (see above)    Results Reviewed with: patient and RN     Treatment Recommended: will follow for any needed changes     Frequency of treatment: as able     Patient Stated Goal: none stated    Dysphagia LTG  -Patient will demonstrate safe and effective oral intake (without overt s/s significant oral/pharyngeal dysphagia including s/s penetration or aspiration) for the highest appropriate diet level       Short Term Goals:    -Pt will tolerate Dysphagia 2/mechanical soft diet and  thin liquid with no significant s/s oral or pharyngeal dysphagia across 1-3 diagnostic session/s       -Patient will tolerate trials of upgraded food and/or liquid texture with no significant s/s of oral or pharyngeal dysphagia including aspiration across 1-3 diagnostic sessions     Speech Therapy Prognosis   Prognosis: fair    Prognosis Considerations: medical status and medically fragile status

## 2021-10-18 NOTE — ASSESSMENT & PLAN NOTE
Lab Results   Component Value Date    EGFR 7 10/18/2021    EGFR 7 10/17/2021    EGFR 8 10/16/2021    CREATININE 5 83 (H) 10/18/2021    CREATININE 5 70 (H) 10/17/2021    CREATININE 4 99 (H) 10/16/2021

## 2021-10-18 NOTE — ASSESSMENT & PLAN NOTE
Lab Results   Component Value Date    EGFR 7 10/18/2021    EGFR 7 10/17/2021    EGFR 8 10/16/2021    CREATININE 5 83 (H) 10/18/2021    CREATININE 5 70 (H) 10/17/2021    CREATININE 4 99 (H) 10/16/2021   worsening RF  In the setting of sepsis  Discussed with son regarding poor prognosis  Nephrology following  No plan for urgent dialysis at this time, although could consider if renal function continues to decline   Patient is still currently agreeable for HD if needed   -discussed with Nephrology on 10/18/2021, with creatinine worsened to 5 83 today and poor urine output, may require initiation of dialysis this week, patient and family are agreeable

## 2021-10-18 NOTE — ASSESSMENT & PLAN NOTE
Pt presented with right upper extremity swelling  Vascular surgery consulted on admission  IR also consulted for fistulagram, plan to do fistulagram once patient is more stable  US doppler showed: There is a >50% stenosis noted in the proximal subclavian vein  There is a >50%  stenosis noted the proximal basilic vein, which also torturous  The dialysis AVF appears to be matured with adequate diameter, flow volumes and  less than 6mm in depth throughout the arm      Vascular and IR following-patient scheduled for IR fistulogram this afternoon  Will check repeat doppler

## 2021-10-18 NOTE — PROGRESS NOTES
NEPHROLOGY PROGRESS NOTE    Nadja Ram 77 y o  female MRN: [id number]  [facility]/Bed[facility] Encounter: [id number]  Reason for Consult:  Acute on chronic kidney disease    Patient is lying in bed sleeping when I went into the room  She was not really conversational for me or answering questions appropriately is seemed that she really just wanted to go home  ASSESSMENT/PLAN:  1  Renal    Patient has acute renal failure with baseline creatinine reportedly around 3 5 and it appears that the acute insult worsening kidney function began around 10/13/2021  The patient on that day and had a CT scan with no IV contrast was found to have retroperitoneal hematoma sepsis and drainage was performed  Recorded urine output over the last 24 hr was only 300 cc so is oliguric  Creatinine continues to slowly increase  I was unable to really converse with the patient about dialysis but will continue to assess and see if this is something that may come up with there is no improvement in renal function  The patient does have chronic obstructive uropathy and imaging was reviewed from 10/13/2021 which showed resolution of left hydronephrosis  As so diagnose the fluid collections  Continue IV fluids with bicarbonate  Consider diuretic infusion if remains oliguric  Supportive care    2  Infectious Disease    Patient has suspected infection potentially of fluid collections noted on CT scan  Receiving empiric antibiotics  I will try to contact patients son to discuss her case  SUBJECTIVE:  ROS    The patient was sleeping when I went into the room she was in no distress when I wake under she just really tell me she wanted to go home and did not really answer my other questioning  It was difficult to obtain accurate review of systems  She was in no distress      OBJECTIVE:  Current Weight: Weight - Scale: 86 2 kg (190 lb)  Vitals:Temp (24hrs), Av 4 °F (36 3 °C), Min:97 3 °F (36 3 °C), Max:97 4 °F (36 3 °C)  Current: Temperature: (!) 97 3 °F (36 3 °C)   Blood pressure 91/61, pulse 103, temperature (!) 97 3 °F (36 3 °C), resp  rate 20, height 5' (1 524 m), weight 86 2 kg (190 lb), SpO2 93 %, not currently breastfeeding  , Body mass index is 37 11 kg/m²  Intake/Output Summary (Last 24 hours) at 10/18/2021 0930  Last data filed at 10/18/2021 0710  Gross per 24 hour   Intake 150 ml   Output 200 ml   Net -50 ml       Physical Exam: BP 91/61   Pulse 103   Temp (!) 97 3 °F (36 3 °C)   Resp 20   Ht 5' (1 524 m)   Wt 86 2 kg (190 lb)   SpO2 93%   BMI 37 11 kg/m²   Physical Exam  Constitutional:       General: She is not in acute distress  Appearance: She is not toxic-appearing or diaphoretic  HENT:      Head: Normocephalic and atraumatic  Mouth/Throat:      Mouth: Mucous membranes are dry  Eyes:      General: No scleral icterus  Extraocular Movements: Extraocular movements intact  Cardiovascular:      Rate and Rhythm: Normal rate and regular rhythm  Heart sounds: No friction rub  No gallop  Comments: Positive edema  Pulmonary:      Effort: Pulmonary effort is normal  No respiratory distress  Breath sounds: Normal breath sounds  No wheezing, rhonchi or rales  Abdominal:      General: Bowel sounds are normal  There is no distension  Palpations: Abdomen is soft  Tenderness: There is no rebound  Musculoskeletal:      Cervical back: Normal range of motion and neck supple  Neurological:      Mental Status: She is alert           Medications:    Current Facility-Administered Medications:     acetaminophen (TYLENOL) tablet 650 mg, 650 mg, Oral, Q6H PRN, Alda Pitts MD, 650 mg at 10/11/21 1739    acetaminophen (TYLENOL) tablet 975 mg, 975 mg, Oral, Q8H [address], Alda Pitts MD, 975 mg at 10/18/21 0507    aluminum-magnesium hydroxide-simethicone (MYLANTA) oral suspension 30 mL, 30 mL, Oral, Q6H PRN, Alda Pitts MD    atorvastatin (LIPITOR) tablet 40 mg, 40 mg, Oral, HS, Jenniffer Bragg MD, 40 mg at 10/17/21 2120    calcitriol (ROCALTROL) capsule 0 25 mcg, 0 25 mcg, Oral, Daily, Jenniffer Bragg MD, 0 25 mcg at 10/17/21 1142    cholecalciferol (VITAMIN D) oral liquid 800 Units, 800 Units, Oral, Daily, Jenniffer Bragg MD, 800 Units at 10/17/21 1144    citalopram (CeleXA) tablet 20 mg, 20 mg, Oral, Daily, Jenniffer Bragg MD, 20 mg at 10/17/21 1140    docusate sodium (COLACE) capsule 100 mg, 100 mg, Oral, BID, Jenniffer Bragg MD, 100 mg at 10/17/21 1140    levothyroxine tablet 75 mcg, 75 mcg, Oral, Daily, Jenniffer Bragg MD, 75 mcg at 10/17/21 1140    melatonin tablet 3 mg, 3 mg, Oral, HS, Jenniffer Bragg MD, 3 mg at 10/17/21 2120    metoprolol (LOPRESSOR) injection 2 5 mg, 2 5 mg, Intravenous, Q6H PRN, Aura Amaral PA-C, 2 5 mg at 10/15/21 0438    metoprolol tartrate (LOPRESSOR) tablet 25 mg, 25 mg, Oral, BID, Elliott Funes PA-C, 25 mg at 10/1946    ondansetron [facility]) injection 4 mg, 4 mg, Intravenous, Q6H PRN, Jenniffer Bragg MD, 4 mg at 10/14/21 2153    piperacillin-tazobactam (ZOSYN) 2 25 g in sodium chloride 0 9 % 50 mL IVPB, 2 25 g, Intravenous, Q6H, Lizbet Bailey MD, Last Rate: 100 mL/hr at 10/18/21 0240, 2 25 g at 10/18/21 0240    saccharomyces boulardii (FLORASTOR) capsule 250 mg, 250 mg, Oral, Daily, Jenniffer Bragg MD, 250 mg at 10/17/21 1140    senna (SENOKOT) tablet 8 6 mg, 1 tablet, Oral, Daily, Jenniffer Bragg MD, 8 6 mg at 10/17/21 1140    sodium bicarbonate 75 mEq in sodium chloride 0 45 % 1,000 mL infusion, 50 mL/hr, Intravenous, Continuous, Dallas Spencer DO, Last Rate: 50 mL/hr at 10/17/21 2219, 50 mL/hr at 10/17/21 2219    Laboratory Results:  Lab Results   Component Value Date    WBC 8 01 10/18/2021    HGB 9 0 (L) 10/18/2021    HCT 29 5 (L) 10/18/2021    MCV 93 10/18/2021     (L) 10/18/2021     Lab Results   Component Value Date    SODIUM 139 10/18/2021    K 3 9 10/18/2021     10/18/2021 CO2 17 (L) 10/18/2021     (H) 10/18/2021    CREATININE 5 83 (H) 10/18/2021    GLUC 83 10/18/2021    CALCIUM 8 5 10/18/2021     Lab Results   Component Value Date    CALCIUM 8 5 10/18/2021    PHOS 7 1 (H) 10/18/2021     No results found for: LABPROT

## 2021-10-18 NOTE — PROGRESS NOTES
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)  Urine cultures positive for Klebsiella and Enterobacter  CT C/A/P with evidence of loculated fluid collection within the pelvic cul-de-sac and and new collectio in the left paracolic gutter  Status post IR drainage and culture collection x2 on 10/13 with both the perinephric and paracolic drainage sites growing enterobacter aerogenes and enterococcus avium susceptible to Zosyn  Throughout the weekend, the patient has continued to progressively decline with worsening fatigue and weakness  Likely multifactorial in nature given presence of problem 3 and 6  Given significant comorbidities, would strongly advise goals of care discussion  Continue Zosyn    Monitor WBC and trend fever curve    Repeat labs tomorrow - CBC w/ diff and BMP   Goals of care discussion with patient and family    Monitor abdominal/flank exam with low threshold for reimaging    Supportive care per primary team     2  Fusobacterium bacteremia: Initial blood cultures positive 2/2  Rare cause of bacteremia and atypical isolate  Most typically due to urinary track or oral infection  UCx with no growth of this organism and patient with no teeth and no signs of oral abscess  Repeat blood cultures with negative growth  CT A/P on 10/14 identifying new fluid collection and patient s/p drainage from both the paracolic and perinephric fluid collections  Cultures showing no growth of this organism  Source unknown but possibly transient due to problem 1      Antibiotics as follow    Monitor WBC and trend fever curve   Follow up pending cultures   Supportive care per primary team     3  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: Patient presenting with worsening flank and abdominal pain  CT abdomen/pelvis demonstrating hematoma in the left renal fosa with extension of hemorrhage/thickening suggesting possible hemoperitoneum  Patient recently underwent PCN and is likely cause of bleed, especially in setting of chronic AC  S/P IR drainage on 10/14 with cultures growing enterobacter and enterococcus  Antibiotics as above   Ongoing follow up by IR, urology, and general surgery    Monitor abdominal/flank exam   Supportive care per primary team     4  Small left pleural effusion: Noted to have small left pleural effusion  S/P OR thoracentesis on 10/11 with removal of 90 cc of nikos fluid  Pleural fluid cultures negative  Antibiotics as above   Monitor WBC and trend fever curve   Monitor O2 saturations    Supportive care per primary team     5  Transaminitits: Patient noted to have mild transaminitis which has since improved  Occurring in the setting of continued diarrhea  No liver or biliary tree involvement noted on most recent CT  If liver enzymes continue to remain elevated and patient is increasingly altered, would question possibility of occult liver abscess  Monitor LFTs   Supportive care and additional workup per primary team     6  Chronic kidney disease with fistula: History of CKD V nearing hemodialysis  Fistula present in RUE with evidence of worsening swelling  Duplex US completed earlier in admission noted 2/2 vein stenosis - vascular surgery and IR following  Minimal urine output  Antibiotics renally adjusted    Monitor urine output    Repeat morning labs   Consider repeat duplex US given worsening RUE edema    Ongoing follow up by nephrology and vascular surgery    Avoid PICC line if possible    7  History of C   Difficile colitis in the setting of continue diarrhea: History of C diff back in   Negative PCR in 2021  Does continue to have recurrent episodes of loose stool  C diff stools studies completed and negative  Noted to have medium sized black/tarry, odorous BM this morning  FOBT positive yesterday in setting of labile hemoglobin  Notified and examined patient with SLIM - recommend GI consultation for melena  Antibtioics as above   Monitor stool output   Recommend gastroenterology consultation    No indication for C diff prophylaxis     8  History of PE: History of chronic saddle pulmonary embolism  Chronically on Eliquis 2 5 mg BID at home  Anticoagulation per primary team     9  Polycythemia vera and MDS: Follow with hematology/oncology as an outpatient and was on Jakafi  Ongoing follow up with hematology oncology    Supportive care per primary team     Antibiotics:  Total Days: 10   Current: Day 5 - Zosyn 2 25 g q6 hours     Thank you for involving us in the care of Ms Simpson Star  Plan as above to be discussed with Infectious Disease attending  Please await for final attestation  Subjective:  Patient seen and examined at bedside this morning  Patient sitting up in bed and saturating appropriately on RA  Does not appear to in any acute distress/discomfort however lethargic  Oriented to place and not fully participating in cooperative, stating that she feels, "[facility] " Offers no complaints yet unable to fully interpret given her degree of lethargy - falling asleep throughout encounter  Patient noted to be sitting in black, tarry stool and room with foul odor  Also noted to have significant increased swelling in RLE  Not currently on [facility]  Notified SLIM and examined together at bedside       Objective:  Vitals:  Temp:  [97 3 °F (36 3 °C)-97 4 °F (36 3 °C)] 97 3 °F (36 3 °C)  HR:  [] 103  Resp:  [18-20] 20  BP: ()/(58-62) 91/61  SpO2:  [92 %-93 %] 93 %  Temp (24hrs), Av 4 °F (36 3 °C), Min:97 3 °F (36 3 °C), Max:97 4 °F (36 3 °C)  Current: Temperature: (!) 97 3 °F (36 3 °C)    Physical Exam:   General Appearance:  Awake  Oriented to placed  Minimally interactive and lethargic on exam; falling asleep throughout encounter  Throat: Oropharynx moist without lesions  Lungs:   Clear to auscultation bilaterally; no wheezes, rhonchi or rales; respirations unlabored   Heart:  RRR; no murmur, rub or gallop   Abdomen:   Soft and non-distended  Hyperactive bowel sounds  No tenderness to palpation  Left flank drain present with no current drainage  Ureterostomy tube in place with very minimal urine output - liu clear urine  Extremities: No clubbing or cyanosis  Significant swelling in the RUE with blister formation  RUE fistula present with palpable thrill  Skin: No new rashes or lesions  No draining wounds notes  Skin tear on anterior chest - bandaged, clear/dry/intact  Significant ecchymosis on the left upper extremity  Labs, Imaging, & Other studies:   All pertinent labs and imaging studies were personally reviewed  Results from last 7 days   Lab Units 10/18/21  0614 10/17/21  1646 10/16/21  0419   WBC Thousand/uL 8 01 9 36 4 93   HEMOGLOBIN g/dL 9 0* 8 9* 7 7*   PLATELETS Thousands/uL 140* 157 148*     Results from last 7 days   Lab Units 10/18/21  0614 10/17/21  1646 10/16/21  0419 10/14/21  0435   SODIUM mmol/L 139 139 140 142   POTASSIUM mmol/L 3 9 4 1 4 1 3 8   CHLORIDE mmol/L 107 107 107 114*   CO2 mmol/L 17* 20* 18* 19*   BUN mg/dL 120* 112* 99* 74*   CREATININE mg/dL 5 83* 5 70* 4 99* 4 56*   EGFR ml/min/1 73sq m 7 7 8 9   CALCIUM mg/dL 8 5 8 6 8 3 7 9*   AST U/L 39  --  52* 80*   ALT U/L 7*  --  10* 11*   ALK PHOS U/L 210*  --  249* 185*     Results from last 7 days   Lab Units 10/13/21  1928 10/13/21  1923 10/12/21  0156 10/11/21  1851   BLOOD CULTURE   --   --  No Growth After 5 Days  No Growth After 5 Days    --    GRAM STAIN RESULT  1+ Polys*  2+ Gram positive cocci in pairs*  2+ Gram positive rods*  1+ Gram negative rods* No Polys or Bacteria seen  --  1+ Polys  No organisms seen   BODY FLUID CULTURE, STERILE  4+ Growth of Enterobacter aerogenes*  4+ Growth of Enterococcus avium* 4+ Growth of Enterobacter aerogenes*  4+ Growth of Enterococcus avium*  --  No growth

## 2021-10-18 NOTE — ASSESSMENT & PLAN NOTE
Left renal hematoma:   Presented with left renal hematoma  Pigtail catheter is noted medial to kidney  Renal pelvis may be collapsed  Hemorrhage and thickening extending in the combined interfascial place of retrospective as well as some high density fluid within  S/p IR drainage 10/12/21 with JOSE ANTONIO drain placement x 2  Output appears to be 30mL from both drains over 24 hour period  General surgery / urology do not plan for surgical intervention  Will need to further discuss when drain removal appropriate

## 2021-10-18 NOTE — PROGRESS NOTES
Patient's SBP 88  Patient mentation unchanged from a m  SLIM aware      BP (!) 88/54   Pulse 72   Temp (!) 95 3 °F (35 2 °C)   Resp 16   Ht 5' (1 524 m)   Wt 86 2 kg (190 lb)   SpO2 97%   BMI 37 11 kg/m²

## 2021-10-18 NOTE — ASSESSMENT & PLAN NOTE
Mild to moderate pleural effusion on CXR    S/p thoracentesis by IR 10/11/21   -patient currently comfortable on room air, chest x-ray from prior shows persistent left pleural effusion

## 2021-10-18 NOTE — PHYSICAL THERAPY NOTE
PT TREATMENT       10/18/21 7721   PT Last Visit   PT Visit Date 10/18/21   Note Type   Note Type Treatment   Pain Assessment   Pain Assessment Tool FLACC   Pain Rating: FLACC (Rest) - Face 0   Pain Rating: FLACC (Rest) - Legs 0   Pain Rating: FLACC (Rest) - Activity 0   Pain Rating: FLACC (Rest) - Cry 0   Pain Rating: FLACC (Rest) - Consolability 0   Score: FLACC (Rest) 0   Pain Rating: FLACC (Activity) - Face 0   Pain Rating: FLACC (Activity) - Legs 0   Pain Rating: FLACC (Activity) - Activity 0   Pain Rating: FLACC (Activity) - Cry 0   Pain Rating: FLACC (Activity) - Consolability 0   Score: FLACC (Activity) 0   Restrictions/Precautions   Other Precautions Fall Risk;Multiple lines;Contact/isolation   General   Chart Reviewed Yes   Response to Previous Treatment Patient with no complaints from previous session  Family/Caregiver Present No   Cognition   Overall Cognitive Status Impaired   Arousal/Participation Lethargic   Attention Difficulty attending to directions   Orientation Level Oriented to person;Disoriented to situation;Disoriented to time;Disoriented to place   Memory Unable to assess  (MINIMALLY VERBAL)   Comments PATIENT LETHARGIC  ABLE TO REPORT NAME AND BIRTHDATE  NOT ENGAGED IN CONVERSATION OTHER THAN SHORT RESPONSES    Subjective   Subjective "IM TIRED"   Endurance Deficit   Endurance Deficit Yes   Endurance Deficit Description EDEMA, GROSS WEAKNESS    Activity Tolerance   Activity Tolerance Patient limited by fatigue   Nurse Made Aware BRODERICK TO SEE PER RN WANDA    Exercises   Heelslides Supine;10 reps;Bilateral;AAROM   Hip Abduction Supine;10 reps;Bilateral;AAROM   Hip Adduction Supine;5 reps;AROM; Bilateral   Ankle Pumps Supine;20 reps;Bilateral;AAROM   Assessment   Prognosis Fair   Problem List Decreased strength;Decreased endurance; Impaired balance;Decreased mobility   Assessment PT INITIATED TREATMENT SESSION IN ORDER TO ASSIST PATIENT IN ACHIEVING GOALS TO IMPROVE OVERALL ACTIVITY TOLERANCE AND LE STRENGTH  PATIENT PARTICIPATED IN BED LEVEL ACTIVE AND ACTIVE ASSISTED B/L LE THERE-EX IN ORDER TO IMPROVE LE STRENGTH AND  IMPROVE ABILITY TO PERFORM TRANSFER AND AMBULATION  PATIENT DEMONSTRATED FAIR TOLERANCE FOR THERE-EX, PERFORMING REPS TO FATIGUE AND WITHOUT  PAIN  FURTHER MOBILITY LIMITED BY GROSS FATIGUE AT THIS TIME  PT D/C RECOMMENDATIONS REMAINS FOR REHAB  PATIENT WILL BENEFIT FROM CONTINUED SKILLED PT THIS ADMISSION TO ACHIEVE MAXIMAL FUNCTION AND SAFETY  Goals   Patient Goals TO SLEEP    STG Expiration Date 10/25/21   PT Treatment Day 3   Plan   Treatment/Interventions Functional transfer training;LE strengthening/ROM; Therapeutic exercise; Endurance training;Patient/family training;Equipment eval/education; Bed mobility;OT;Spoke to nursing   Progress Slow progress, decreased activity tolerance   PT Frequency Other (Comment)  (3-5X/WK)   Recommendation   PT Discharge Recommendation Post acute rehabilitation services   PT - OK to Discharge Yes  (TO REHAB WHEN MED BRODERICK )   AM-PAC Basic Mobility Inpatient   Turning in Bed Without Bedrails 2   Lying on Back to Sitting on Edge of Flat Bed 1   Moving Bed to Chair 1   Standing Up From Chair 1   Walk in Room 1   Turning Head Towards Sound 4   Follow Simple Instructions 3     The patient's AM-PAC Basic Mobility Inpatient Standardized Score is less than 42 9, suggesting this patient may benefit from discharge to post-acute rehabilitation services  Please also refer to the recommendation of the Physical Therapist for safe discharge planning      NILAM PABON PT, DPT

## 2021-10-18 NOTE — PROGRESS NOTES
PO intake overall sub-optimal over the course of admit; currently rx for clear liquids   Rec: advance PO diet back to solid foods when medically appropriate and as per recs from Speech for level 2 dysphagia and thin liquids; recommend add berry nepro nutrition supplement BID

## 2021-10-19 ENCOUNTER — APPOINTMENT (INPATIENT)
Dept: NON INVASIVE DIAGNOSTICS | Facility: HOSPITAL | Age: 76
DRG: 981 | End: 2021-10-19
Payer: COMMERCIAL

## 2021-10-19 ENCOUNTER — PATIENT OUTREACH (OUTPATIENT)
Dept: CASE MANAGEMENT | Facility: HOSPITAL | Age: 76
End: 2021-10-19

## 2021-10-19 PROBLEM — M79.89 SWELLING OF RIGHT UPPER EXTREMITY: Status: ACTIVE | Noted: 2021-10-10

## 2021-10-19 PROBLEM — R78.81 BACTEREMIA: Status: ACTIVE | Noted: 2021-10-19

## 2021-10-19 PROBLEM — D69.6 THROMBOCYTOPENIA (HCC): Status: ACTIVE | Noted: 2021-10-19

## 2021-10-19 PROBLEM — E03.9 HYPOTHYROIDISM: Status: ACTIVE | Noted: 2021-10-19

## 2021-10-19 LAB
ALBUMIN SERPL BCP-MCNC: 1.5 G/DL (ref 3.5–5)
ALP SERPL-CCNC: 197 U/L (ref 46–116)
ALT SERPL W P-5'-P-CCNC: 7 U/L (ref 12–78)
ANION GAP SERPL CALCULATED.3IONS-SCNC: 13 MMOL/L (ref 4–13)
AST SERPL W P-5'-P-CCNC: 34 U/L (ref 5–45)
BACTERIA SPEC ANAEROBE CULT: NORMAL
BACTERIA SPEC ANAEROBE CULT: NORMAL
BILIRUB SERPL-MCNC: 0.6 MG/DL (ref 0.2–1)
BUN SERPL-MCNC: 129 MG/DL (ref 5–25)
CALCIUM ALBUM COR SERPL-MCNC: 10.6 MG/DL (ref 8.3–10.1)
CALCIUM SERPL-MCNC: 8.6 MG/DL (ref 8.3–10.1)
CHLORIDE SERPL-SCNC: 105 MMOL/L (ref 100–108)
CO2 SERPL-SCNC: 19 MMOL/L (ref 21–32)
CREAT SERPL-MCNC: 6.4 MG/DL (ref 0.6–1.3)
ERYTHROCYTE [DISTWIDTH] IN BLOOD BY AUTOMATED COUNT: 18.9 % (ref 11.6–15.1)
GFR SERPL CREATININE-BSD FRML MDRD: 6 ML/MIN/1.73SQ M
GLUCOSE SERPL-MCNC: 77 MG/DL (ref 65–140)
HCT VFR BLD AUTO: 29 % (ref 34.8–46.1)
HGB BLD-MCNC: 9 G/DL (ref 11.5–15.4)
MCH RBC QN AUTO: 28.8 PG (ref 26.8–34.3)
MCHC RBC AUTO-ENTMCNC: 31 G/DL (ref 31.4–37.4)
MCV RBC AUTO: 93 FL (ref 82–98)
PLATELET # BLD AUTO: 122 THOUSANDS/UL (ref 149–390)
PMV BLD AUTO: 12.2 FL (ref 8.9–12.7)
POTASSIUM SERPL-SCNC: 4.2 MMOL/L (ref 3.5–5.3)
PROT SERPL-MCNC: 5.2 G/DL (ref 6.4–8.2)
RBC # BLD AUTO: 3.13 MILLION/UL (ref 3.81–5.12)
SODIUM SERPL-SCNC: 137 MMOL/L (ref 136–145)
WBC # BLD AUTO: 9.27 THOUSAND/UL (ref 4.31–10.16)

## 2021-10-19 PROCEDURE — 99233 SBSQ HOSP IP/OBS HIGH 50: CPT | Performed by: INTERNAL MEDICINE

## 2021-10-19 PROCEDURE — 93990 DOPPLER FLOW TESTING: CPT

## 2021-10-19 PROCEDURE — 80053 COMPREHEN METABOLIC PANEL: CPT | Performed by: INTERNAL MEDICINE

## 2021-10-19 PROCEDURE — 99232 SBSQ HOSP IP/OBS MODERATE 35: CPT | Performed by: INTERNAL MEDICINE

## 2021-10-19 PROCEDURE — C9113 INJ PANTOPRAZOLE SODIUM, VIA: HCPCS | Performed by: INTERNAL MEDICINE

## 2021-10-19 PROCEDURE — 85027 COMPLETE CBC AUTOMATED: CPT | Performed by: INTERNAL MEDICINE

## 2021-10-19 RX ADMIN — Medication 250 MG: at 11:23

## 2021-10-19 RX ADMIN — ATORVASTATIN CALCIUM 40 MG: 40 TABLET, FILM COATED ORAL at 21:58

## 2021-10-19 RX ADMIN — PIPERACILLIN AND TAZOBACTAM 2.25 G: 36; 4.5 INJECTION, POWDER, FOR SOLUTION INTRAVENOUS at 22:50

## 2021-10-19 RX ADMIN — SODIUM BICARBONATE 50 ML/HR: 84 INJECTION, SOLUTION INTRAVENOUS at 21:55

## 2021-10-19 RX ADMIN — PANTOPRAZOLE SODIUM 40 MG: 40 INJECTION, POWDER, FOR SOLUTION INTRAVENOUS at 21:58

## 2021-10-19 RX ADMIN — MELATONIN TAB 3 MG 3 MG: 3 TAB at 21:58

## 2021-10-19 RX ADMIN — PANTOPRAZOLE SODIUM 40 MG: 40 INJECTION, POWDER, FOR SOLUTION INTRAVENOUS at 11:24

## 2021-10-19 RX ADMIN — PIPERACILLIN AND TAZOBACTAM 2.25 G: 36; 4.5 INJECTION, POWDER, FOR SOLUTION INTRAVENOUS at 03:29

## 2021-10-19 RX ADMIN — CALCITRIOL 0.25 MCG: 0.25 CAPSULE, LIQUID FILLED ORAL at 11:23

## 2021-10-19 RX ADMIN — CITALOPRAM HYDROBROMIDE 20 MG: 20 TABLET ORAL at 11:23

## 2021-10-19 RX ADMIN — LEVOTHYROXINE SODIUM 75 MCG: 75 TABLET ORAL at 11:23

## 2021-10-19 RX ADMIN — PIPERACILLIN AND TAZOBACTAM 2.25 G: 36; 4.5 INJECTION, POWDER, FOR SOLUTION INTRAVENOUS at 11:24

## 2021-10-19 RX ADMIN — PIPERACILLIN AND TAZOBACTAM 2.25 G: 36; 4.5 INJECTION, POWDER, FOR SOLUTION INTRAVENOUS at 17:31

## 2021-10-19 NOTE — ASSESSMENT & PLAN NOTE
Progressively worsening renal failure  Son aware of poor prognosis - appreciate nephrology input -> will further discuss with son in regarding initiation of hemodialysis if desired - per prior documentation, patient is currently agreeable to hemodialysis if necessary  Monitor renal function and urine output - limit/avoid nephrotoxins if possible - avoid hypotension as possible  History of obstructive uropathy secondary to left-sided hydronephrosis s/p stenting noted - recent CT imaging on 10/13 revealed resolution of hydronephrosis -> progressive increase in creatinine still noted, making obstructive etiology less likely  H/o ileal conduit noted as well  Renally dose antibiotics

## 2021-10-19 NOTE — ASSESSMENT & PLAN NOTE
Appreciate gastroenterology input -> plan for eventual endoscopic investigation once mentation improves in medically optimized  Monitor H/H  Holding Eliquis

## 2021-10-19 NOTE — PROGRESS NOTES
NEPHROLOGY PROGRESS NOTE    Carroll Padilla 77 y o  female MRN: [id number]  Unit/Bed#: [facility] 929-01 Encounter: [id number]  Reason for Consult:  Acute on chronic kidney disease    Patient is awake alert able answer questions appropriately of for me  The patient states she is just not feeling well in the bed she has pain all over her body and just feels frustrated  She was in no distress  ASSESSMENT/PLAN:  1  Renal    The patient developed acute on chronic kidney disease with chronic obstructive uropathy  She has an ileal conduit present and a left nephroureteral stent  Repeat imaging on the 13th of this month showed resolution of hydro on the left  Unfortunately patient's creatinine continues to worsen with worsening azotemia and she is oliguric urine volumes  She is clinically behaving like she is in ATN and given that the last imaging was negative for hydronephrosis that does not seem to be playing a role  I question the patient and told her that her kidneys are shutting down and that if it continues she potentially may need dialysis  She did say that she would do it if she had to  I will be reaching out to her son later to discuss as well  Even though the last imaging on the 13th showed there was no hydronephrosis I feel given her oliguria and approaching dialysis we need to make sure that there was no obstruction  Supportive care and if no improvement will likely need dialysis in next day or so  Renal ultrasound to make sure no obstruction    I did given update to her son and the patient herself told me if she requires dialysis she would do it  The patient and her son are in agreement with this and I will keep him posted  2  Infectious Disease    Patient with fluid collections noted on CT scan  Receiving empiric antibiotics with piperacillin tazobactam     SUBJECTIVE:  Review of Systems   Constitutional: Positive for decreased appetite and malaise/fatigue  Negative for chills and fever  HENT: Negative  Eyes: Negative  Cardiovascular: Negative for chest pain, dyspnea on exertion, orthopnea and palpitations  Respiratory: Negative  Negative for cough, shortness of breath, sputum production and wheezing  Gastrointestinal: Positive for anorexia  Negative for bloating, abdominal pain, diarrhea, nausea and vomiting  OBJECTIVE:  Current Weight: Weight - Scale: 86 2 kg (190 lb)  Vitals:Temp (24hrs), Av 3 °F (35 7 °C), Min:95 3 °F (35 2 °C), Max:97 1 °F (36 2 °C)  Current: Temperature: (!) 97 1 °F (36 2 °C)   Blood pressure (!) 82/56, pulse 99, temperature (!) 97 1 °F (36 2 °C), resp  rate 18, height 5' (1 524 m), weight 86 2 kg (190 lb), SpO2 96 %, not currently breastfeeding  , Body mass index is 37 11 kg/m²        Intake/Output Summary (Last 24 hours) at 10/19/2021 1223  Last data filed at 10/19/2021 0307  Gross per 24 hour   Intake 0 ml   Output 150 ml   Net -150 ml       Physical Exam: BP (!) 82/56   Pulse 99   Temp (!) 97 1 °F (36 2 °C)   Resp 18   Ht 5' (1 524 m)   Wt 86 2 kg (190 lb)   SpO2 96%   BMI 37 11 kg/m²   Physical Exam    Medications:    Current Facility-Administered Medications:     acetaminophen (TYLENOL) tablet 650 mg, 650 mg, Oral, Q6H PRN, Nando Aguilera MD, 650 mg at 10/11/21 1739    aluminum-magnesium hydroxide-simethicone (MYLANTA) oral suspension 30 mL, 30 mL, Oral, Q6H PRN, Nando Aguilera MD    atorvastatin (LIPITOR) tablet 40 mg, 40 mg, Oral, HS, Nando Aguilera MD, 40 mg at 10/17/21 2120    calcitriol (ROCALTROL) capsule 0 25 mcg, 0 25 mcg, Oral, Daily, Nando Aguilera MD, 0 25 mcg at 10/19/21 1123    cholecalciferol (VITAMIN D) oral liquid 800 Units, 800 Units, Oral, Daily, Nando Aguilera MD, 800 Units at 10/19/21 1123    citalopram (CeleXA) tablet 20 mg, 20 mg, Oral, Daily, Nando Aguilera MD, 20 mg at 10/19/21 1123    docusate sodium (COLACE) capsule 100 mg, 100 mg, Oral, BID, Nando Aguilera MD, 100 mg at 10/17/21 1140   levothyroxine tablet 75 mcg, 75 mcg, Oral, Daily, Jenniffer Bragg MD, 75 mcg at 10/19/21 1123    melatonin tablet 3 mg, 3 mg, Oral, HS, Jenniffer Bragg MD, 3 mg at 10/17/21 2120    metoprolol (LOPRESSOR) injection 2 5 mg, 2 5 mg, Intravenous, Q6H PRN, Aura Amaral PA-C, 2 5 mg at 10/15/21 0438    metoprolol tartrate (LOPRESSOR) tablet 25 mg, 25 mg, Oral, BID, Aura Amaral PA-C, 25 mg at 10/18/21 1203    ondansetron (ZOFRAN) injection 4 mg, 4 mg, Intravenous, Q6H PRN, Jenniffer Bragg MD, 4 mg at 10/14/21 2153    pantoprazole (PROTONIX) injection 40 mg, 40 mg, Intravenous, Q12H [address], Mumtaz Banai, DO, 40 mg at 10/19/21 1124    piperacillin-tazobactam (ZOSYN) 2 25 g in sodium chloride 0 9 % 50 mL IVPB, 2 25 g, Intravenous, Q6H, Lizbet Bailey MD, Last Rate: 100 mL/hr at 10/19/21 1124, 2 25 g at 10/19/21 1124    saccharomyces boulardii (FLORASTOR) capsule 250 mg, 250 mg, Oral, Daily, Jenniffer Bragg MD, 250 mg at 10/19/21 1123    senna (SENOKOT) tablet 8 6 mg, 1 tablet, Oral, Daily, Jenniffer Bragg MD, 8 6 mg at 10/17/21 1140    sodium bicarbonate 75 mEq in sodium chloride 0 45 % 1,000 mL infusion, 50 mL/hr, Intravenous, Continuous, Dallasnidia Spencer DO, Last Rate: 50 mL/hr at 10/18/21 2153, 50 mL/hr at 10/18/21 2153    Laboratory Results:  Lab Results   Component Value Date    WBC 9 27 10/19/2021    HGB 9 0 (L) 10/19/2021    HCT 29 0 (L) 10/19/2021    MCV 93 10/19/2021     (L) 10/19/2021     Lab Results   Component Value Date    SODIUM 137 10/19/2021    K 4 2 10/19/2021     10/19/2021    CO2 19 (L) 10/19/2021     (H) 10/19/2021    CREATININE 6 40 (H) 10/19/2021    GLUC 77 10/19/2021    CALCIUM 8 6 10/19/2021     Lab Results   Component Value Date    CALCIUM 8 6 10/19/2021    PHOS 7 1 (H) 10/18/2021     No results found for: LABPROT

## 2021-10-19 NOTE — PROGRESS NOTES
[facility]  Progress Note - Jann Garcia [birth year], 77 y o  female MRN: [id number]  [facility] Encounter: [id number]  Primary Care Provider: Ravinder Mosley MD   Date and time admitted to hospital: 10/9/2021  2:19 PM      * Sepsis on admission  Assessment & Plan  Previously with fever coupled with tachycardia and elevated procalcitonin  Likely secondary to infected left renal hematoma -> currently on an IV Zosyn regimen per Infectious Disease - Fusobacterium bacteremia noted - fluid culture from 10/13 s/p IR-guided drainage growing Enterobacter/Enterococcus  Remains on an IV Zosyn regimen per Infectious Disease  Continue to monitor vitals and maintain hemodynamics    Acute renal failure superimposed on CKD stage 5  Assessment & Plan  Progressively worsening renal failure  Son aware of poor prognosis - appreciate nephrology input -> will further discuss with son in regarding initiation of hemodialysis if desired - per prior documentation, patient is currently agreeable to hemodialysis if necessary  Monitor renal function and urine output - limit/avoid nephrotoxins if possible - avoid hypotension as possible  History of obstructive uropathy secondary to left-sided hydronephrosis s/p stenting noted - recent CT imaging on 10/13 revealed resolution of hydronephrosis -> progressive increase in creatinine still noted, making obstructive etiology less likely  H/o ileal conduit noted as well  Renally dose antibiotics    Bacteremia  Assessment & Plan  Blood cultures from 10/9 growing Fusobacterium - on IV Zosyn per Infectious Disease  Repeat blood cultures 10/12 are negative    Swelling of right upper extremity  Assessment & Plan  Plan for repeat RUE vascular doppler study today - previous study 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  Plan for IR guided fistulogram once more medically stable  Supportive care    Melena  Assessment & Plan  Appreciate gastroenterology input -> plan for eventual endoscopic investigation once mentation improves in medically optimized  Monitor H/H  Holding Eliquis    Essential hypertension  Assessment & Plan  C/w Lopressor (w/ holding parameters for borderline hypotensive states)    Hypothyroidism  Assessment & Plan  C/w Synthroid    History of pulmonary embolism  Assessment & Plan  Holding Eliquis in the setting of perinephric hematoma and melena    Thrombocytopenia  Assessment & Plan  Monitor platelet count - monitor for bleeding    Anemia of chronic disease  Assessment & Plan  Monitor H/H and transfuse if necessary - s/p previous transfusion on 10/12  In the setting of chronic kidney disease exacerbated by acute melena and renal hematoma (see above)  See plan for melena above      DVT Prophylaxis:  SCDs      Patient Centered Rounds:  I have performed bedside rounds and discussed plan of care with nursing today  Discussions with Specialists or Other Care Team Provider:  see above assessments if applicable    Education and Discussions with Family / Patient:  Patient at bedside - nephrology to discuss initiation of hemodialysis with son    Time Spent for Care:  32 minutes  More than 50% of total time spent on counseling and coordination of care as described above  Current Length of Stay: 10 day(s)    Current Patient Status: Inpatient   Certification Statement:  Patient will continue to require additional hospital stay due to assessments as noted above  Code Status: Level 1 - Full Code        Subjective:     Remains weak/fatigued  Per nursing, she has been sleeping most in the morning  Objective:     Vitals:   Temp (24hrs), Av 3 °F (35 7 °C), Min:95 3 °F (35 2 °C), Max:97 1 °F (36 2 °C)    Temp:  [95 3 °F (35 2 °C)-97 1 °F (36 2 °C)] 97 1 °F (36 2 °C)  HR:  [72-99] 99  Resp:  [16-20] 18  BP: (82-94)/(53-58) 82/56  SpO2:  [95 %-97 %] 96 %  Body mass index is 37 11 kg/m²       Input and Output Summary (last 24 hours):        Intake/Output Summary (Last 24 hours) at 10/19/2021 1335  Last data filed at 10/19/2021 0307  Gross per 24 hour   Intake --   Output 150 ml   Net -150 ml       Physical Exam:     GENERAL:  Obese - weak/fatigued  HEAD:  Normocephalic - atraumatic  EYES: PERRL - EOMI   MOUTH:  Mucosa moist  NECK:  Supple - full range of motion  CARDIAC:  Rate controlled - S1/S2 positive  PULMONARY:  Mildly diminished respiratory effort - nonlabored respirations  ABDOMEN:  Soft - nontender/nondistended - active bowel sounds - left nephrostomy tube present  MUSCULOSKELETAL:  Motor strength/range of motion markedly deconditioned - distal LE edema present  NEUROLOGIC:  Intermittently lethargic  SKIN:  Chronic wrinkles/blemishes - purple LUE ecchymoses present  PSYCHIATRIC:  Mood/affect flat      Additional Data:     Labs & Recent Cultures:    Results from last 7 days   Lab Units 10/19/21  0600 10/18/21  1504   WBC Thousand/uL 9 27 9 86   HEMOGLOBIN g/dL 9 0* 8 8*   HEMATOCRIT % 29 0* 28 8*   PLATELETS Thousands/uL 122* 123*   BANDS PCT %  --  6   LYMPHO PCT %  --  11*   MONO PCT %  --  6   EOS PCT %  --  2     Results from last 7 days   Lab Units 10/19/21  0600   POTASSIUM mmol/L 4 2   CHLORIDE mmol/L 105   CO2 mmol/L 19*   BUN mg/dL 129*   CREATININE mg/dL 6 40*   CALCIUM mg/dL 8 6   ALK PHOS U/L 197*   ALT U/L 7*   AST U/L 34     Results from last 7 days   Lab Units 10/18/21  1504   INR  1 63*             Results from last 7 days   Lab Units 10/16/21  0419 10/13/21  1539   LACTIC ACID mmol/L 1 1 1 8         Results from last 7 days   Lab Units 10/13/21  1928 10/13/21  1923   GRAM STAIN RESULT  1+ Polys*  2+ Gram positive cocci in pairs*  2+ Gram positive rods*  1+ Gram negative rods* No Polys or Bacteria seen   BODY FLUID CULTURE, STERILE  4+ Growth of Enterobacter aerogenes*  4+ Growth of Enterococcus avium* 4+ Growth of Enterobacter aerogenes*  4+ Growth of Enterococcus avium*         Last 24 Hours Medication List:   Current Facility-Administered Medications   Medication Dose Route Frequency Provider Last Rate    acetaminophen  650 mg Oral Q6H PRN Marilin Cramer MD      aluminum-magnesium hydroxide-simethicone  30 mL Oral Q6H PRN Marilin Cramer MD      atorvastatin  40 mg Oral HS Marilin Cramer MD      calcitriol  0 25 mcg Oral Daily Marilin Cramer MD      cholecalciferol  800 Units Oral Daily Marilin Cramer MD      citalopram  20 mg Oral Daily Marilin Cramer MD      docusate sodium  100 mg Oral BID Marilin Cramer MD      levothyroxine  75 mcg Oral Daily Marilin Cramer MD      melatonin  3 mg Oral HS Marilin Cramer MD      metoprolol  2 5 mg Intravenous Q6H PRN Rosamaria Ruff PA-C      metoprolol tartrate  25 mg Oral BID Rosamaria Ruff PA-C      ondansetron  4 mg Intravenous Q6H PRN Marilin Cramer MD      pantoprazole  40 mg Intravenous Q12H [address] Mumtaz DO Carlotta      piperacillin-tazobactam  2 25 g Intravenous Q6H Pete Gonzalez MD 2 25 g (10/19/21 1124)    saccharomyces boulardii  250 mg Oral Daily Marilin Cramer MD      senna  1 tablet Oral Daily Marilin Cramer MD      sodium bicarbonate infusion  50 mL/hr Intravenous Continuous Sharene SaverDO 50 mL/hr (10/18/21 2153)                  ** Please Note: This note is constructed using a voice recognition dictation system  An occasional wrong word/phrase or sound-a-like substitution may have been picked up by dictation device due to the inherent limitations of voice recognition software  Read the chart carefully and recognize, using reasonable context, where substitutions may have occurred  **

## 2021-10-19 NOTE — PROGRESS NOTES
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)  Urine cultures positive for Klebsiella and Enterobacter  CT C/A/P with evidence of loculated fluid collection within the pelvic cul-de-sac and new collection in the left paracolic gutter  S/P IR drainage and culture collection x2 on 10/13 with both the perinephric and paracolic drainage sites growing enterobacter aerogenes and enterococcus avium susceptible to Zosyn  Throughout the weekend and still today, the patient has continued to have progressive decline with worsening fatigue and weakness  Likely multifactorial in nature especially in the presence of problem 5  Given decline and significant comorbidities, would strongly advise goals of care discussion  Continue Zosyn    Monitor WBC and trend fever curve   Repeat labs tomorrow - CBC w/ diff and BMP   Ongoing urology and nephrology follow up    Goals of care discussion with patient and family    Monitor abdominal/flank exam with low threshold for reimaging    Supportive care per primary team     2  Fusobacterium bacteremia: Initial blood cultures positive 2/2  Rare cause of bacterium and atypical isolate and contaminate  Most typically due to urinary track or oral infection  UCx with no growth of this organism and patient with no teeth or signs of oral abscess  Repeat BCx with negative growth  CT A/P on 10/14 identifying new fluid collection and patient now s/p drainage from both the paracolic and perinephric fluid collections  Cultures showing no growth of this organism  Source remains unknown   Possibly transient due to problem 1  However given patient's recent episodes of melena, possibility for a GI tract source still remains  Antibiotics as above   Plan for 14 days of antibiotics; plan to transition to Flagyl on 10/21/21   Monitor WBC and trend fever curve   Follow up pending cultures   Supportive care per primary team     3  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: Patient presenting with worsening flank and abdominal pain  CT abdomen/pelvis demonstrating hematoma in the left renal fosa with extension of the hemorrhage/thickening suggesting possible hemoperitoneum  Patient recently underwent PCN and is likely cause of bleed, especially in setting of chronic AC  S/P IR drainage on 10/14 with cultures growing enterobacter and enterococcus  Antibiotics as above   Plan for 7 days of therapy following drain placement then transition to Flagyl on 10/21/21 for problem 2   Follow up pending anaerobic cultures   Ongoing follow up by IR, urology, and general surgery    Monitor abdominal/flank exam   Supportive care per primary team     4  Small left pleural effusion: Noted to have small left pleural effusion  S/P OR thoracentesis on 10/11 with removal of 90 cc of nikos fluid  Cultures negative  Antibiotics as above   Monitor WBC and trend fever curve   Monitor O2 saturations   Supportive care per primary team     5  Chronic kidney disease with fistula: History of CKD V nearing hemodialysis  Worsening creatinine parallels declining mental status  Fistula present in the RUE with evidence of worsening swelling  Duplex US completed earlier in admission noted 2/2 vein stenosis - vascular surgery and IR following  Very minimal urine output in last 24 hours  Antibiotics renally adjusted   Monitor urine output   Repeat morning labs   Ongoing follow up by nephrology and vascular surgery    Avoid upper extremity PICC line    6   Transaminitis: Patient noted to have mild transaminitis which has since improved  Occurring the setting of continued diarrhea  No liver or bilary treat involvement noted on recent CT  LFTs improved at this time  Monitor LFTs   Supportive care per primary team     7  History of C  Difficile collitis in the setting of continued diarrhea  History of C Diff in 2019  C  Diff studies this admission negative  Antibiotics as above   Monitor stool output   No indication for C diff prophylaxis    8  Melena: Noted to have multiple episodes of black, tarry odorous stool  FOBT positive and hemoglobin labile  Off all anticoagulation this admission  Given no source of problem 2, concern for possible GI tract source given new onset melena  Monitor stool output    Monitor and trend H&H; transfusions per primary team    Gastroenterology following; appreciate input    9  History of PE: History of chronic saddle PE  On Eliquis 2 5 mg BID at home  Anticoagulation per primary team     10  Polycythemia vera and MDS: Follows with hematology/oncology as an outpatient and was on Jakafi  Ongoing follow up with hematology oncology    Supportive care per primary team      Antibiotics:  Total Days: 11  Current: Day 6 - Zosyn 2 25g q6 hours     Subjective:  No events overnight per nursing  Hypotension noted  Patient seen and examined at bedside this morning  Patient lying in bed in no acute distress or visible discomfort  Remains very lethargic, more difficult to arouse today  Will stay awake to provide one word answers but immediately falls back to sleep  Groaning  Unable to complete thorough ROS but patient offers no complaints this morning  Denies pain but does endorse fatigue       Objective:  Vitals:  Temp:  [95 3 °F (35 2 °C)-96 5 °F (35 8 °C)] 96 4 °F (35 8 °C)  HR:  [72-75] 74  Resp:  [16-20] 18  BP: (82-94)/(53-58) 82/55  SpO2:  [95 %-97 %] 97 %  Temp (24hrs), Av 1 °F (35 6 °C), Min:95 3 °F (35 2 °C), Max:96 5 °F (35 8 °C)  Current: Temperature: (!) 96 4 °F (35 8 °C)    Physical Exam:   General Appearance: Will awake to tactile stimulation but very lethargic, falling asleep spontaneously throughout encounter  Minimally interactive  Throat: Oropharynx moist without lesions  Lungs:   Poor respiratory effort  No accessory muscle use  Course breath sounds bilaterally and diffusely  Saturating appropriately on RA  Heart:  RRR; no murmur, rub or gallop   Abdomen:   Soft, non-tender, non-distended, positive bowel sounds  Ureteroscopy tube in place with very minimal output  Extremities: No clubbing  Cool upper extremities bilaterally  Continued significant swelling in the RUE with blister formation  RUE fistula present with palpable thrill  Skin: No new rashes or lesions  No draining wounds noted  Skin tear on anterior chest -  Bandage, clear /dry/ intact  Significant ecchymosis on left upper extremity         Labs, Imaging, & Other studies:   All pertinent labs and imaging studies were personally reviewed  Results from last 7 days   Lab Units 10/19/21  0600 10/18/21  1504 10/18/21  0614   WBC Thousand/uL 9 27 9 86 8 01   HEMOGLOBIN g/dL 9 0* 8 8* 9 0*   PLATELETS Thousands/uL 122* 123* 140*     Results from last 7 days   Lab Units 10/19/21  0600 10/18/21  0614 10/17/21  1646 10/16/21  0419   SODIUM mmol/L 137 139 139 140   POTASSIUM mmol/L 4 2 3 9 4 1 4 1   CHLORIDE mmol/L 105 107 107 107   CO2 mmol/L 19* 17* 20* 18*   BUN mg/dL 129* 120* 112* 99*   CREATININE mg/dL 6 40* 5 83* 5 70* 4 99*   EGFR ml/min/1 73sq m 6 7 7 8   CALCIUM mg/dL 8 6 8 5 8 6 8 3   AST U/L 34 39  --  52*   ALT U/L 7* 7*  --  10*   ALK PHOS U/L 197* 210*  --  249*     Results from last 7 days   Lab Units 10/13/21  1928 10/13/21  1923   GRAM STAIN RESULT  1+ Polys*  2+ Gram positive cocci in pairs*  2+ Gram positive rods*  1+ Gram negative rods* No Polys or Bacteria seen   BODY FLUID CULTURE, STERILE  4+ Growth of Enterobacter aerogenes*  4+ Growth of Enterococcus avium* 4+ Growth of Enterobacter aerogenes* 4+ Growth of Enterococcus avium*

## 2021-10-19 NOTE — ASSESSMENT & PLAN NOTE
Plan for repeat RUE vascular doppler study today - previous study 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  Plan for IR guided fistulogram once more medically stable  Supportive care

## 2021-10-19 NOTE — ASSESSMENT & PLAN NOTE
Monitor H/H and transfuse if necessary - s/p previous transfusion on 10/12  In the setting of chronic kidney disease exacerbated by acute melena and renal hematoma (see above)  See plan for melena above

## 2021-10-19 NOTE — ASSESSMENT & PLAN NOTE
Blood cultures from 10/9 growing Fusobacterium - on IV Zosyn per Infectious Disease  Repeat blood cultures 10/12 are negative

## 2021-10-19 NOTE — ASSESSMENT & PLAN NOTE
Previously with fever coupled with tachycardia and elevated procalcitonin  Likely secondary to infected left renal hematoma -> currently on an IV Zosyn regimen per Infectious Disease - Fusobacterium bacteremia noted - fluid culture from 10/13 s/p IR-guided drainage growing Enterobacter/Enterococcus  Remains on an IV Zosyn regimen per Infectious Disease  Continue to monitor vitals and maintain hemodynamics

## 2021-10-20 ENCOUNTER — APPOINTMENT (INPATIENT)
Dept: RADIOLOGY | Facility: HOSPITAL | Age: 76
DRG: 981 | End: 2021-10-20
Payer: COMMERCIAL

## 2021-10-20 ENCOUNTER — APPOINTMENT (INPATIENT)
Dept: DIALYSIS | Facility: HOSPITAL | Age: 76
DRG: 981 | End: 2021-10-20
Payer: COMMERCIAL

## 2021-10-20 LAB
ALBUMIN SERPL BCP-MCNC: 1.5 G/DL (ref 3.5–5)
ALP SERPL-CCNC: 210 U/L (ref 46–116)
ALT SERPL W P-5'-P-CCNC: <6 U/L (ref 12–78)
ANION GAP SERPL CALCULATED.3IONS-SCNC: 15 MMOL/L (ref 4–13)
AST SERPL W P-5'-P-CCNC: 39 U/L (ref 5–45)
BILIRUB SERPL-MCNC: 0.6 MG/DL (ref 0.2–1)
BUN SERPL-MCNC: 128 MG/DL (ref 5–25)
CALCIUM ALBUM COR SERPL-MCNC: 10.3 MG/DL (ref 8.3–10.1)
CALCIUM SERPL-MCNC: 8.3 MG/DL (ref 8.3–10.1)
CHLORIDE SERPL-SCNC: 103 MMOL/L (ref 100–108)
CO2 SERPL-SCNC: 20 MMOL/L (ref 21–32)
CREAT SERPL-MCNC: 6.78 MG/DL (ref 0.6–1.3)
ERYTHROCYTE [DISTWIDTH] IN BLOOD BY AUTOMATED COUNT: 19 % (ref 11.6–15.1)
GFR SERPL CREATININE-BSD FRML MDRD: 5 ML/MIN/1.73SQ M
GLUCOSE SERPL-MCNC: 90 MG/DL (ref 65–140)
HBV CORE AB SER QL: NORMAL
HBV CORE IGM SER QL: NORMAL
HBV SURFACE AB SER-ACNC: <3.1 MIU/ML
HBV SURFACE AG SER QL: NORMAL
HCT VFR BLD AUTO: 27.9 % (ref 34.8–46.1)
HCV AB SER QL: NORMAL
HEMOCCULT STL QL: POSITIVE
HGB BLD-MCNC: 8.5 G/DL (ref 11.5–15.4)
MCH RBC QN AUTO: 28.2 PG (ref 26.8–34.3)
MCHC RBC AUTO-ENTMCNC: 30.5 G/DL (ref 31.4–37.4)
MCV RBC AUTO: 93 FL (ref 82–98)
NRBC BLD AUTO-RTO: 1 /100 WBCS
PLATELET # BLD AUTO: 127 THOUSANDS/UL (ref 149–390)
PMV BLD AUTO: 12.6 FL (ref 8.9–12.7)
POTASSIUM SERPL-SCNC: 4.2 MMOL/L (ref 3.5–5.3)
PROT SERPL-MCNC: 5.3 G/DL (ref 6.4–8.2)
RBC # BLD AUTO: 3.01 MILLION/UL (ref 3.81–5.12)
SODIUM SERPL-SCNC: 138 MMOL/L (ref 136–145)
WBC # BLD AUTO: 11.25 THOUSAND/UL (ref 4.31–10.16)

## 2021-10-20 PROCEDURE — 86803 HEPATITIS C AB TEST: CPT | Performed by: INTERNAL MEDICINE

## 2021-10-20 PROCEDURE — 99233 SBSQ HOSP IP/OBS HIGH 50: CPT | Performed by: INTERNAL MEDICINE

## 2021-10-20 PROCEDURE — 86704 HEP B CORE ANTIBODY TOTAL: CPT | Performed by: INTERNAL MEDICINE

## 2021-10-20 PROCEDURE — 93990 DOPPLER FLOW TESTING: CPT | Performed by: SURGERY

## 2021-10-20 PROCEDURE — 99232 SBSQ HOSP IP/OBS MODERATE 35: CPT | Performed by: INTERNAL MEDICINE

## 2021-10-20 PROCEDURE — 36558 INSERT TUNNELED CV CATH: CPT | Performed by: RADIOLOGY

## 2021-10-20 PROCEDURE — C9113 INJ PANTOPRAZOLE SODIUM, VIA: HCPCS | Performed by: INTERNAL MEDICINE

## 2021-10-20 PROCEDURE — 76937 US GUIDE VASCULAR ACCESS: CPT

## 2021-10-20 PROCEDURE — 76937 US GUIDE VASCULAR ACCESS: CPT | Performed by: RADIOLOGY

## 2021-10-20 PROCEDURE — 36558 INSERT TUNNELED CV CATH: CPT

## 2021-10-20 PROCEDURE — 0JH63XZ INSERTION OF TUNNELED VASCULAR ACCESS DEVICE INTO CHEST SUBCUTANEOUS TISSUE AND FASCIA, PERCUTANEOUS APPROACH: ICD-10-PCS | Performed by: RADIOLOGY

## 2021-10-20 PROCEDURE — 77001 FLUOROGUIDE FOR VEIN DEVICE: CPT

## 2021-10-20 PROCEDURE — 86706 HEP B SURFACE ANTIBODY: CPT | Performed by: INTERNAL MEDICINE

## 2021-10-20 PROCEDURE — NC001 PR NO CHARGE: Performed by: NURSE PRACTITIONER

## 2021-10-20 PROCEDURE — 90935 HEMODIALYSIS ONE EVALUATION: CPT | Performed by: INTERNAL MEDICINE

## 2021-10-20 PROCEDURE — 5A1D70Z PERFORMANCE OF URINARY FILTRATION, INTERMITTENT, LESS THAN 6 HOURS PER DAY: ICD-10-PCS | Performed by: RADIOLOGY

## 2021-10-20 PROCEDURE — 76770 US EXAM ABDO BACK WALL COMP: CPT

## 2021-10-20 PROCEDURE — 86705 HEP B CORE ANTIBODY IGM: CPT | Performed by: INTERNAL MEDICINE

## 2021-10-20 PROCEDURE — 02H633Z INSERTION OF INFUSION DEVICE INTO RIGHT ATRIUM, PERCUTANEOUS APPROACH: ICD-10-PCS | Performed by: RADIOLOGY

## 2021-10-20 PROCEDURE — 85027 COMPLETE CBC AUTOMATED: CPT | Performed by: INTERNAL MEDICINE

## 2021-10-20 PROCEDURE — 80053 COMPREHEN METABOLIC PANEL: CPT | Performed by: INTERNAL MEDICINE

## 2021-10-20 PROCEDURE — 77001 FLUOROGUIDE FOR VEIN DEVICE: CPT | Performed by: RADIOLOGY

## 2021-10-20 PROCEDURE — C1750 CATH, HEMODIALYSIS,LONG-TERM: HCPCS

## 2021-10-20 PROCEDURE — 99152 MOD SED SAME PHYS/QHP 5/>YRS: CPT

## 2021-10-20 PROCEDURE — 87340 HEPATITIS B SURFACE AG IA: CPT | Performed by: INTERNAL MEDICINE

## 2021-10-20 PROCEDURE — 99153 MOD SED SAME PHYS/QHP EA: CPT

## 2021-10-20 RX ORDER — FENTANYL CITRATE 50 UG/ML
INJECTION, SOLUTION INTRAMUSCULAR; INTRAVENOUS CODE/TRAUMA/SEDATION MEDICATION
Status: COMPLETED | OUTPATIENT
Start: 2021-10-20 | End: 2021-10-20

## 2021-10-20 RX ORDER — METRONIDAZOLE 500 MG/1
500 TABLET ORAL EVERY 8 HOURS SCHEDULED
Status: DISPENSED | OUTPATIENT
Start: 2021-10-21 | End: 2021-10-25

## 2021-10-20 RX ADMIN — CITALOPRAM HYDROBROMIDE 20 MG: 20 TABLET ORAL at 12:10

## 2021-10-20 RX ADMIN — ATORVASTATIN CALCIUM 40 MG: 40 TABLET, FILM COATED ORAL at 22:11

## 2021-10-20 RX ADMIN — SODIUM BICARBONATE 50 ML/HR: 84 INJECTION, SOLUTION INTRAVENOUS at 22:11

## 2021-10-20 RX ADMIN — FENTANYL CITRATE 25 MCG: 50 INJECTION INTRAMUSCULAR; INTRAVENOUS at 14:36

## 2021-10-20 RX ADMIN — PIPERACILLIN AND TAZOBACTAM 2.25 G: 36; 4.5 INJECTION, POWDER, FOR SOLUTION INTRAVENOUS at 23:30

## 2021-10-20 RX ADMIN — FENTANYL CITRATE 25 MCG: 50 INJECTION INTRAMUSCULAR; INTRAVENOUS at 14:19

## 2021-10-20 RX ADMIN — METOPROLOL TARTRATE 25 MG: 25 TABLET, FILM COATED ORAL at 18:33

## 2021-10-20 RX ADMIN — PANTOPRAZOLE SODIUM 40 MG: 40 INJECTION, POWDER, FOR SOLUTION INTRAVENOUS at 12:10

## 2021-10-20 RX ADMIN — PIPERACILLIN AND TAZOBACTAM 2.25 G: 36; 4.5 INJECTION, POWDER, FOR SOLUTION INTRAVENOUS at 17:54

## 2021-10-20 RX ADMIN — Medication 250 MG: at 12:10

## 2021-10-20 RX ADMIN — METOROPROLOL TARTRATE 2.5 MG: 5 INJECTION, SOLUTION INTRAVENOUS at 16:18

## 2021-10-20 RX ADMIN — LEVOTHYROXINE SODIUM 75 MCG: 75 TABLET ORAL at 12:10

## 2021-10-20 RX ADMIN — CALCITRIOL 0.25 MCG: 0.25 CAPSULE, LIQUID FILLED ORAL at 12:19

## 2021-10-20 RX ADMIN — PANTOPRAZOLE SODIUM 40 MG: 40 INJECTION, POWDER, FOR SOLUTION INTRAVENOUS at 22:11

## 2021-10-20 RX ADMIN — PIPERACILLIN AND TAZOBACTAM 2.25 G: 36; 4.5 INJECTION, POWDER, FOR SOLUTION INTRAVENOUS at 04:54

## 2021-10-20 RX ADMIN — PIPERACILLIN AND TAZOBACTAM 2.25 G: 36; 4.5 INJECTION, POWDER, FOR SOLUTION INTRAVENOUS at 12:04

## 2021-10-20 NOTE — PROGRESS NOTES
[address] [facility]  Progress Note - Carole Callahan [birth year], 77 y o  female MRN: [id number]  Unit/Bed#: [facility] 929-01 Encounter: [id number]  Primary Care Provider: Moriah Cm MD   Date and time admitted to hospital: 10/9/2021  2:19 PM      * Sepsis on admission  Assessment & Plan  Previously with fever coupled with tachycardia and elevated procalcitonin  Likely secondary to infected left renal hematoma -> Fusobacterium bacteremia noted - fluid culture from 10/13 s/p IR-guided drainage growing Enterobacter/Enterococcus  IV Zosyn regimen per Infectious Disease transitioned to Flagyl as patient to be initiated on hemodialysis  Continue to monitor vitals and maintain hemodynamics    Acute renal failure superimposed on CKD stage 5  Assessment & Plan  Progressively worsening renal failure  Son aware of poor/guarded prognosis - appreciate nephrology input -> plan for tunneled catheter placement today for initiation of dialysis  Monitor renal function and urine output - limit/avoid nephrotoxins if possible - avoid hypotension as possible  History of obstructive uropathy secondary to left-sided hydronephrosis s/p stenting noted - recent CT imaging on 10/13 revealed resolution of hydronephrosis -> progressive increase in creatinine still noted, making obstructive etiology less likely  H/o ileal conduit noted as well    Bacteremia  Assessment & Plan  Blood cultures from 10/9 growing Fusobacterium - IV Zosyn transitioned to Flagyl per Infectious Disease  Repeat blood cultures 10/12 are negative    Swelling of right upper extremity  Assessment & Plan  Plan for repeat RUE vascular doppler study today - previous study 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  Possible plan for IR guided fistulogram once more medically stable  Supportive care otherwise    [address] gastroenterology input -> tentative plan for endoscopy tomorrow  Monitor H/H  Holding Eliquis    Essential hypertension  Assessment & Plan  C/w Lopressor (w/ holding parameters for borderline hypotensive states)    Hypothyroidism  Assessment & Plan  C/w Synthroid    History of pulmonary embolism  Assessment & Plan  Holding Eliquis in the setting of perinephric hematoma and melena    Thrombocytopenia  Assessment & Plan  Monitor platelet count - monitor for bleeding    Anemia of chronic disease  Assessment & Plan  Monitor H/H and transfuse if necessary - s/p previous transfusion on 10/12  In the setting of chronic kidney disease exacerbated by acute melena and renal hematoma (see above)  See plan for melena above      DVT Prophylaxis:  SCDs       Patient Centered Rounds:  I have performed bedside rounds and discussed plan of care with nursing today  Discussions with Specialists or Other Care Team Provider:  see above assessments if applicable    Education and Discussions with Family / Patient:  Patient, along with son, at bedside    Time Spent for Care:  32 minutes  More than 50% of total time spent on counseling and coordination of care as described above  Current Length of Stay: 11 day(s)    Current Patient Status: Inpatient   Certification Statement:  Patient will continue to require additional hospital stay due to assessments as noted above  Code Status: Level 1 - Full Code        Subjective:     Seen/examined earlier today  Son present bedside during my encounter  Objective:     Vitals:   Temp (24hrs), Av 1 °F (36 2 °C), Min:96 °F (35 6 °C), Max:98 °F (36 7 °C)    Temp:  [96 °F (35 6 °C)-98 °F (36 7 °C)] 96 °F (35 6 °C)  HR:  [] 117  Resp:  [15-18] 15  BP: ()/(48-88) 109/68  SpO2:  [93 %-100 %] 95 %  Body mass index is 37 11 kg/m²  Input and Output Summary (last 24 hours):        Intake/Output Summary (Last 24 hours) at 10/20/2021 1739  Last data filed at 10/20/2021 1724  Gross per 24 hour   Intake 1100 ml   Output 1600 ml   Net -500 ml Physical Exam:     GENERAL:  Weak/fatigued - obese  HEAD:  Normocephalic - atraumatic  EYES: PERRL - EOMI   MOUTH:  Mucosa moist  NECK:  Supple - full range of motion  CARDIAC:  Rate controlled - S1/S2 positive  PULMONARY:  Diminished respiratory effort - nonlabored respirations  ABDOMEN:  Soft - nontender/nondistended - active bowel sounds  MUSCULOSKELETAL:  Motor strength/range of motion markedly deconditioned - distal LE and upper extremity edema present  NEUROLOGIC:  Intermittently lethargic  SKIN:  Chronic wrinkles/blemishes - purple LUE ecchymoses present       Additional Data:     Labs & Recent Cultures:    Results from last 7 days   Lab Units 10/20/21  0600 10/18/21  1504   WBC Thousand/uL 11 25* 9 86   HEMOGLOBIN g/dL 8 5* 8 8*   HEMATOCRIT % 27 9* 28 8*   PLATELETS Thousands/uL 127* 123*   BANDS PCT %  --  6   LYMPHO PCT %  --  11*   MONO PCT %  --  6   EOS PCT %  --  2     Results from last 7 days   Lab Units 10/20/21  0600   POTASSIUM mmol/L 4 2   CHLORIDE mmol/L 103   CO2 mmol/L 20*   BUN mg/dL 128*   CREATININE mg/dL 6 78*   CALCIUM mg/dL 8 3   ALK PHOS U/L 210*   ALT U/L <6*   AST U/L 39     Results from last 7 days   Lab Units 10/18/21  1504   INR  1 63*             Results from last 7 days   Lab Units 10/16/21  0419   LACTIC ACID mmol/L 1 1         Results from last 7 days   Lab Units 10/13/21  1928 10/13/21  1923   GRAM STAIN RESULT  1+ Polys*  2+ Gram positive cocci in pairs*  2+ Gram positive rods*  1+ Gram negative rods* No Polys or Bacteria seen   BODY FLUID CULTURE, STERILE  4+ Growth of Enterobacter aerogenes*  4+ Growth of Enterococcus avium* 4+ Growth of Enterobacter aerogenes*  4+ Growth of Enterococcus avium*         Last 24 Hours Medication List:   Current Facility-Administered Medications   Medication Dose Route Frequency Provider Last Rate    acetaminophen  650 mg Oral Q6H PRN Amara Oliveira MD      aluminum-magnesium hydroxide-simethicone  30 mL Oral Q6H PRN Colan Shield R Caitlin Pozo MD      atorvastatin  40 mg Oral HS Vinetta Semen, MD      calcitriol  0 25 mcg Oral Daily Vinetta Semen, MD      cholecalciferol  800 Units Oral Daily Angeletta Semen, MD      citalopram  20 mg Oral Daily Vinetta Semen, MD      docusate sodium  100 mg Oral BID Vinetta Semen, MD      levothyroxine  75 mcg Oral Daily Vinetta Semen, MD      melatonin  3 mg Oral HS Claude Semen, MD      metoprolol  2 5 mg Intravenous Q6H PRN Sherolyn Duverney, PA-C      metoprolol tartrate  25 mg Oral BID Sherolyn Duverney, PA-C      [START ON 10/21/2021] metroNIDAZOLE  500 mg Oral [facility] Ugo Odell MD      ondansetron  4 mg Intravenous Q6H PRN Claude Diaz, MD      pantoprazole  40 mg Intravenous Q12H [address] Mumtaz Laguerre DO      piperacillin-tazobactam  2 25 g Intravenous Q6H Ugo Odell MD 2 25 g (10/20/21 1204)    saccharomyces boulardii  250 mg Oral Daily Vinetta Semen, MD      senna  1 tablet Oral Daily Vinetta Semen, MD      sodium bicarbonate infusion  50 mL/hr Intravenous Continuous Angela Aleman DO 50 mL/hr (10/19/21 2155)                    ** Please Note: This note is constructed using a voice recognition dictation system  An occasional wrong word/phrase or sound-a-like substitution may have been picked up by dictation device due to the inherent limitations of voice recognition software  Read the chart carefully and recognize, using reasonable context, where substitutions may have occurred  **

## 2021-10-20 NOTE — PROGRESS NOTES
Progress Note- Yesica Sands 77 y o  female MRN: [id number]    Unit/Bed#: [facility] 929-01 Encounter: [id number]      Assessment and Plan:    Melena  77year old female with PMHx of HTN, CKD, pulmonary embolism on Eliquis, polycythemia vera and obstructive uropathy complicated by hydronephrosis status post stent placement complicated by hematoma of the left kidney status post drainage by IR  Patient was noted to have black/tarry stools on 10/17-10/18 and FOBT positive, we were consulted for melena  Her hemoglobin has been ranging between 6-9 during this admission  Suspected anemia in the setting of recent hematoma  Anticoagulation is on hold  · Continue PPI  · Plan for endoscopy tomorrow  · Continue monitoring H/H  · Monitor bowel movements  · By primary team, plan to restart anticoagulation after endoscopy    ______________________________________________________________________    Subjective:     Patient seen and examined at bedside  She is more alert and oriented today  Reports feeling uncomfortable  Denies N/V, abdominal pain, bloating but reports having diarrhea  Denies any episodes of bloody or black stool previous to this hospitalization  Reports never having an endoscopy before but having a colonoscopy "a while ago"  She is unsure of previous colonoscopy results  She is willing to proceed with endoscopic evaluation during this hospitalization if needed  No other complaints reported      Medication Administration - last 24 hours from 10/19/2021 0840 to 10/20/2021 0840       Date/Time Order Dose Route Action Action by     10/19/2021 2158 atorvastatin (LIPITOR) tablet 40 mg 40 mg Oral Given Jessica Thorpe RN     10/19/2021 1123 calcitriol (ROCALTROL) capsule 0 25 mcg 0 25 mcg Oral Given Janae Phoenix RN     10/19/2021 1123 cholecalciferol (VITAMIN D) oral liquid 800 Units 800 Units Oral Given Janae Phoenix RN     10/19/2021 1123 citalopram (CeleXA) tablet 20 mg 20 mg Oral Given Janae Phoenix RN 10/19/2021 1123 levothyroxine tablet 75 mcg 75 mcg Oral Given Roque Conde, JOHNATHAN     10/19/2021 2158 melatonin tablet 3 mg 3 mg Oral Given Emily Krause, JOHNATHAN     10/19/2021 1123 saccharomyces boulardii (FLORASTOR) capsule 250 mg 250 mg Oral Given Roque Conde, JOHNATHAN     10/19/2021 1734 docusate sodium (COLACE) capsule 100 mg 100 mg Oral Not Given Roque Conde RN     10/20/2021 0601 piperacillin-tazobactam (ZOSYN) 2 25 g in sodium chloride 0 9 % 50 mL IVPB 0 g Intravenous Stopped Efrain Godinez, JOHNATHAN     10/20/2021 0454 piperacillin-tazobactam (ZOSYN) 2 25 g in sodium chloride 0 9 % 50 mL IVPB 2 25 g Intravenous New Bag Efrain Godinez, JOHNATHAN     10/19/2021 2250 piperacillin-tazobactam (ZOSYN) 2 25 g in sodium chloride 0 9 % 50 mL IVPB 2 25 g Intravenous New Bag Marita Macias, JOHNATHAN     10/19/2021 1731 piperacillin-tazobactam (ZOSYN) 2 25 g in sodium chloride 0 9 % 50 mL IVPB 2 25 g Intravenous [address] Roque Conde RN     10/19/2021 1124 piperacillin-tazobactam (ZOSYN) 2 25 g in sodium chloride 0 9 % 50 mL IVPB 2 25 g Intravenous [address] Roque Conde RN     10/19/2021 2155 sodium bicarbonate 75 mEq in sodium chloride 0 45 % 1,000 mL infusion 50 mL/hr Intravenous [address] Emily Krause, JOHNATHAN     10/19/2021 1738 metoprolol tartrate (LOPRESSOR) tablet 25 mg 25 mg Oral Not Given Roque Conde RN     10/19/2021 2158 pantoprazole (PROTONIX) injection 40 mg 40 mg Intravenous Given Emily Krause RN     10/19/2021 1124 pantoprazole (PROTONIX) injection 40 mg 40 mg Intravenous Given Roque Conde RN          Objective:     Vitals: Blood pressure 97/53, pulse 69, temperature (!) 97 4 °F (36 3 °C), temperature source Axillary, resp  rate 16, height 5' (1 524 m), weight 86 2 kg (190 lb), SpO2 100 %, not currently breastfeeding  ,Body mass index is 37 11 kg/m²        Intake/Output Summary (Last 24 hours) at 10/20/2021 0840  Last data filed at 10/20/2021 0301  Gross per 24 hour   Intake 600 ml   Output 100 ml   Net 500 ml       Physical Exam:   General Appearance: Awake and alert, in no acute distress  Abdomen: Soft, non-tender, non-distended; bowel sounds normal; no masses or no organomegaly    Invasive Devices     Peripherally Inserted Central Catheter Line            PICC Line 10/11/21 8 days          Line            Hemodialysis AV Fistula 09/30/21 Right Upper arm 20 days          Drain            Urostomy Ileal conduit RUQ 1841 days    Closed/Suction Drain Left Other (Comment) 6 days    Closed/Suction Drain Left Other (Comment) 10 Fr  6 days                Lab Results:  No results displayed because visit has over 200 results  Imaging Studies: I have personally reviewed pertinent imaging studies

## 2021-10-20 NOTE — HEMODIALYSIS
Post-Dialysis RN Treatment Note    Blood Pressure:  Pre 113/63mm/Hg  Post 101/61mmHg   EDW: TBD   Weight:  Deferred, pt on stretcher from IR   Mode of weight measurement: N/A   Volume Removed  1000ml    Treatment duration 120 minutes    NS given  No    Treatment shortened? No   Medications given during Rx None Reported   Estimated Kt/V  0 44   Access type: Permacath/TDC   Access Status: Yes, describe: BFR to 250 as prescribed  No issues during treatment  Report called to primary nurse   Yes, Lucho Zavala RN  Patient tachycardic during treatment  -120  Metoprolol 2 5mg IV given  HR improved  Post HD VS - , R 15, /61  Dr Parris Amezcua is aware

## 2021-10-20 NOTE — TELEMEDICINE
e-Consult (IPC)  - Interventional Radiology  Lauren Mcarthur 77 y o  female MRN: [id number]  Unit/Bed#: [facility] 929-01 Encounter: [id number]    IR has been consulted to evaluate the patient, determine the appropriate procedure, and whether or not a procedure can and should be performed regarding the care of Lauren Mcarthur  We were consulted by nephrology concerning new start HD, and to possibly perform a permacath placement if medically appropriate for the patient  IP Consult to IR  Consult performed by: VICTORINA Ross  Consult ordered by: Edwardo Gan MD        10/20/21      Assessment/Recommendation:     77year old female known to IR for previous PCN and drainage tube insertions, now in need of new start dialysis since her R brachiobascilic fistula was recently superficialized and trasposed on 9/30/2021 and is not ready to be used  - plan for permacath placement today 10/20  - keep NPO      Total time spent in review of data, discussion with requesting provider and rendering advice was 22 minutes  Patient or appropriate family member was verbally informed by nephrology of this consultative service on their behalf to provide more timely access to specialty care in lieu of an in person consultation  Verbal consent was obtained  Thank you for allowing Interventional Radiology to participate in the care of Lauren Mcarthur  Please don't hesitate to call or TigerText us with any questions       [address] [place] [address] Keith Arellano

## 2021-10-20 NOTE — PLAN OF CARE
2 hour initial hemodialysis session planned for today with ultrafiltration goal of 1-2 liters to optimize fluid and electrolyte balance     Problem: METABOLIC, FLUID AND ELECTROLYTES - ADULT  Goal: Electrolytes maintained within normal limits  Description: INTERVENTIONS:  - Monitor labs and assess patient for signs and symptoms of electrolyte imbalances  - Administer electrolyte replacement as ordered  - Monitor response to electrolyte replacements, including repeat lab results as appropriate  - Instruct patient on fluid and nutrition as appropriate  Outcome: Progressing  Goal: Fluid balance maintained  Description: INTERVENTIONS:  - Monitor labs   - Monitor I/O and WT  - Instruct patient on fluid and nutrition as appropriate  - Assess for signs & symptoms of volume excess or deficit  Outcome: Progressing

## 2021-10-20 NOTE — PHYSICAL THERAPY NOTE
Physical Therapy Cancellation Note       10/20/21 0937   PT Last Visit   PT Visit Date 10/20/21   Note Type   Note Type Treatment   Cancel Reasons Patient off floor/test     Pt chart reviewed  Pt currently off floor at [facility]   PT to continue to follow and see pt as appropriate and able         Vella Gosselin, PT, DPT

## 2021-10-20 NOTE — PLAN OF CARE
Problem: MOBILITY - ADULT  Goal: Maintain or return to baseline ADL function  Description: INTERVENTIONS:  -  Assess patient's ability to carry out ADLs; assess patient's baseline for ADL function and identify physical deficits which impact ability to perform ADLs (bathing, care of mouth/teeth, toileting, grooming, dressing, etc )  - Assess/evaluate cause of self-care deficits   - Assess range of motion  - Assess patient's mobility; develop plan if impaired  - Assess patient's need for assistive devices and provide as appropriate  - Encourage maximum independence but intervene and supervise when necessary  - Involve family in performance of ADLs  - Assess for home care needs following discharge   - Consider OT consult to assist with ADL evaluation and planning for discharge  - Provide patient education as appropriate  Outcome: Progressing  Goal: Maintains/Returns to pre admission functional level  Description: INTERVENTIONS:  - Perform BMAT or MOVE assessment daily    - Set and communicate daily mobility goal to care team and patient/family/caregiver     - Collaborate with rehabilitation services on mobility goals if consulted  - Out of bed for toileting  - Record patient progress and toleration of activity level   Outcome: Progressing     Problem: Potential for Falls  Goal: Patient will remain free of falls  Description: INTERVENTIONS:  - Educate patient/family on patient safety including physical limitations  - Instruct patient to call for assistance with activity   - Consult OT/PT to assist with strengthening/mobility   - Keep Call bell within reach  - Keep bed low and locked with side rails adjusted as appropriate  - Keep care items and personal belongings within reach  - Initiate and maintain comfort rounds  - Make Fall Risk Sign visible to staff  - Apply yellow socks and bracelet for high fall risk patients  - Consider moving patient to room near nurses station  Outcome: Progressing

## 2021-10-20 NOTE — SEDATION DOCUMENTATION
Left IJ tunneled permacath placement completed by Dr Faizan Acosta without complications  Pt tolerated well  Report called to dialysis and pt transported to dialysis

## 2021-10-20 NOTE — ASSESSMENT & PLAN NOTE
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

## 2021-10-20 NOTE — BRIEF OP NOTE (RAD/CATH)
INTERVENTIONAL RADIOLOGY PROCEDURE NOTE    Date: 10/20/2021    Procedure: Procedure name not found  Preoperative diagnosis:   1  Renal hematoma    2  Sepsis ([address] )    3  Pleural effusion    4  Iron deficiency anemia due to chronic blood loss    5  Intra-abdominal collection    6  Atrial fibrillation ([facility] [address] )    7  Melena         Postoperative diagnosis: Same  Surgeon: Sylvie Knutson MD     Assistant: None  No qualified resident was available  Blood loss: minimal    Specimens: none     Findings: 32 cm tunneled left internal jugular vein dialysis catheter inserted with tip in the RA  Complications: None immediate      Anesthesia: local and IV Fentanyl

## 2021-10-20 NOTE — ASSESSMENT & PLAN NOTE
Appreciate gastroenterology input -> tentative plan for endoscopy tomorrow  Monitor H/H  Holding Eliquis

## 2021-10-20 NOTE — ASSESSMENT & PLAN NOTE
Previously with fever coupled with tachycardia and elevated procalcitonin  Likely secondary to infected left renal hematoma -> Fusobacterium bacteremia noted - fluid culture from 10/13 s/p IR-guided drainage growing Enterobacter/Enterococcus  IV Zosyn regimen per Infectious Disease transitioned to Flagyl as patient to be initiated on hemodialysis  Continue to monitor vitals and maintain hemodynamics

## 2021-10-20 NOTE — ASSESSMENT & PLAN NOTE
Blood cultures from 10/9 growing Fusobacterium - IV Zosyn transitioned to Flagyl per Infectious Disease  Repeat blood cultures 10/12 are negative

## 2021-10-20 NOTE — PROGRESS NOTES
NEPHROLOGY PROGRESS NOTE    Susan Falcon 77 y o  female MRN: [id number]  [facility] Encounter: [id number]  Reason for Consult:  Acute on chronic kidney disease    The patient was resting in bed she opened her eyes just looked very weak debilitated did have much of an appetite was lethargic  She was in no distress  ASSESSMENT/PLAN:  1  Renal    The patient has acute on chronic kidney disease with chronic obstructive uropathy  Ileal conduit present patient has left nephroureteral stent  Last imaging did not show any hydro I did order an ultrasound to make sure there is no hydronephrosis but very unlikely  Urology had seen patient as well  Patient is behaving like she is in ATN because she has oliguria with worsening renal function azotemia and I suspect become uremic lethargy  I spoke to her yesterday and her son about dialysis and do recommend starting it today  I did call the son left a message to update him the patient is aware  Consult IR for catheter  Initiate hemodialysis    The patient was seen after catheter placed in Interventional Radiology  Consent was obtained for dialysis and I was present for the initiation of the treatment  Details of today's treatment outline below  HEMODIALYSIS PROCEDURE NOTE  The patient was seen and examined on hemodialysis  Time: 2 hours  Sodium: 138 Blood flow: 250   Dialyzer: F160 Potassium: 3 Dialysate flow: 600   Access: catheter Bicarbonate: 35 Ultrafiltration goal: 1 - 2        Monitor hemodynamically and plan for 2nd treatment tomorrow  I did call and update her son Bree Gutiérrez that she was started on dialysis with no complications  2  Infectious Disease    Had received piperacillin tazobactam   Status post IR drainage of collections  Sections disease follow-up  She did have positive blood cultures as well and surveillance were negative  SUBJECTIVE:  Review of Systems   Constitutional: Positive for malaise/fatigue   Negative for chills, diaphoresis and fever  HENT: Negative  Eyes: Negative  Cardiovascular: Positive for leg swelling  Negative for chest pain, dyspnea on exertion and orthopnea  Respiratory: Negative for cough, shortness of breath, sputum production and wheezing  Gastrointestinal: Positive for anorexia  Negative for abdominal pain, diarrhea, nausea and vomiting  Neurological: Positive for weakness  Negative for dizziness, focal weakness, headaches and light-headedness  Psychiatric/Behavioral: Negative for altered mental status, hallucinations and hypervigilance  The patient is not nervous/anxious  OBJECTIVE:  Current Weight: Weight - Scale: 86 2 kg (190 lb)  Vitals:Temp (24hrs), Av °F (36 1 °C), Min:96 3 °F (35 7 °C), Max:97 4 °F (36 3 °C)  Current: Temperature: (!) 97 4 °F (36 3 °C)   Blood pressure 97/53, pulse 69, temperature (!) 97 4 °F (36 3 °C), temperature source Axillary, resp  rate 16, height 5' (1 524 m), weight 86 2 kg (190 lb), SpO2 100 %, not currently breastfeeding  , Body mass index is 37 11 kg/m²  Intake/Output Summary (Last 24 hours) at 10/20/2021 0911  Last data filed at 10/20/2021 0301  Gross per 24 hour   Intake 600 ml   Output 100 ml   Net 500 ml       Physical Exam: BP 97/53   Pulse 69   Temp (!) 97 4 °F (36 3 °C) (Axillary)   Resp 16   Ht 5' (1 524 m)   Wt 86 2 kg (190 lb)   SpO2 100%   BMI 37 11 kg/m²   Physical Exam  Constitutional:       General: She is not in acute distress  Appearance: She is not diaphoretic  Comments: Alert but lethargic opens eyes slowly  HENT:      Head: Normocephalic and atraumatic  Mouth/Throat:      Mouth: Mucous membranes are dry  Eyes:      General: No scleral icterus  Extraocular Movements: Extraocular movements intact  Cardiovascular:      Rate and Rhythm: Normal rate and regular rhythm  Heart sounds: No friction rub  No gallop         Comments: Positive edema  Pulmonary:      Effort: Pulmonary effort is normal  No respiratory distress  Breath sounds: Normal breath sounds  No wheezing, rhonchi or rales  Abdominal:      General: Bowel sounds are normal  There is no distension  Palpations: Abdomen is soft  Tenderness: There is no abdominal tenderness  There is no rebound  Musculoskeletal:      Cervical back: Normal range of motion and neck supple  Psychiatric:      Comments: Lethargic but opens eyes  Looks very weak           Medications:    Current Facility-Administered Medications:     acetaminophen (TYLENOL) tablet 650 mg, 650 mg, Oral, Q6H PRN, Bradly Farah MD, 650 mg at 10/11/21 1739    aluminum-magnesium hydroxide-simethicone (MYLANTA) oral suspension 30 mL, 30 mL, Oral, Q6H PRN, Bardly Farah MD    atorvastatin (LIPITOR) tablet 40 mg, 40 mg, Oral, HS, Bradly Farah MD, 40 mg at 10/19/21 2158    calcitriol (ROCALTROL) capsule 0 25 mcg, 0 25 mcg, Oral, Daily, Bradly Farah MD, 0 25 mcg at 10/19/21 1123    cholecalciferol (VITAMIN D) oral liquid 800 Units, 800 Units, Oral, Daily, Bradly Farah MD, 800 Units at 10/19/21 1123    citalopram (CeleXA) tablet 20 mg, 20 mg, Oral, Daily, Bradly Farah MD, 20 mg at 10/19/21 1123    docusate sodium (COLACE) capsule 100 mg, 100 mg, Oral, BID, Bradly Farah MD, 100 mg at 10/17/21 1140    levothyroxine tablet 75 mcg, 75 mcg, Oral, Daily, Bradly Farah MD, 75 mcg at 10/19/21 1123    melatonin tablet 3 mg, 3 mg, Oral, HS, Bradly Farah MD, 3 mg at 10/19/21 2158    metoprolol (LOPRESSOR) injection 2 5 mg, 2 5 mg, Intravenous, Q6H PRN, Anant Arias PA-C, 2 5 mg at 10/15/21 0438    metoprolol tartrate (LOPRESSOR) tablet 25 mg, 25 mg, Oral, BID, Isela Funes PA-C, 25 mg at 10/18/21 1203    [START ON 10/21/2021] metroNIDAZOLE (FLAGYL) tablet 500 mg, 500 mg, Oral, Q8H [address], Demetrice Harris MD    ondansetron (ZOFRAN) injection 4 mg, 4 mg, Intravenous, Q6H PRN, Bradly Farah MD, 4 mg at 10/14/21 5629   pantoprazole (PROTONIX) injection 40 mg, 40 mg, Intravenous, Q12H [address], Mumtaz Carlotta DO, 40 mg at 10/19/21 2158    piperacillin-tazobactam (ZOSYN) 2 25 g in sodium chloride 0 9 % 50 mL IVPB, 2 25 g, Intravenous, Q6H, Charline Carlson MD, Stopped at 10/20/21 0601    saccharomyces boulardii (FLORASTOR) capsule 250 mg, 250 mg, Oral, Daily, Eugenio Parson MD, 250 mg at 10/19/21 1123    senna (SENOKOT) tablet 8 6 mg, 1 tablet, Oral, Daily, Eugenio Parson MD, 8 6 mg at 10/17/21 1140    sodium bicarbonate 75 mEq in sodium chloride 0 45 % 1,000 mL infusion, 50 mL/hr, Intravenous, Continuous, Dallas Spencer DO, Last Rate: 50 mL/hr at 10/19/21 2155, 50 mL/hr at 10/19/21 2155    Laboratory Results:  Lab Results   Component Value Date    WBC 11 25 (H) 10/20/2021    HGB 8 5 (L) 10/20/2021    HCT 27 9 (L) 10/20/2021    MCV 93 10/20/2021     (L) 10/20/2021     Lab Results   Component Value Date    SODIUM 138 10/20/2021    K 4 2 10/20/2021     10/20/2021    CO2 20 (L) 10/20/2021     (H) 10/20/2021    CREATININE 6 78 (H) 10/20/2021    GLUC 90 10/20/2021    CALCIUM 8 3 10/20/2021     Lab Results   Component Value Date    CALCIUM 8 3 10/20/2021    PHOS 7 1 (H) 10/18/2021     No results found for: LABPROT

## 2021-10-20 NOTE — ASSESSMENT & PLAN NOTE
Progressively worsening renal failure  Son aware of poor/guarded prognosis - appreciate nephrology input -> plan for tunneled catheter placement today for initiation of dialysis  Monitor renal function and urine output - limit/avoid nephrotoxins if possible - avoid hypotension as possible  History of obstructive uropathy secondary to left-sided hydronephrosis s/p stenting noted - recent CT imaging on 10/13 revealed resolution of hydronephrosis -> progressive increase in creatinine still noted, making obstructive etiology less likely  H/o ileal conduit noted as well

## 2021-10-20 NOTE — OCCUPATIONAL THERAPY NOTE
Occupational Therapy Cancel Note        Patient Name: Toni Bell  VLTQX'T Date: 10/20/2021         10/20/21 3535   OT Last Visit   OT Visit Date 10/20/21   Note Type   Note Type Treatment   Cancel Reasons Patient off floor/test       OT orders received  Chart reviewed  Attempted to see pt for OT tx session; however, pt currently off floor at [facility]  Will continue to follow and see pt as appropriate and available       Alda Alegre MS, OTR/L

## 2021-10-20 NOTE — PROGRESS NOTES
Progress Note - Infectious Disease   Ralph Drake 77 y o  female MRN: [id number]  Unit/Bed#: [facility] 929-01 Encounter: [id number]      Impression/Plan:  1  SIRS syndrome, sepsis: Patient presenting with worsening pain and was found to be febrile and tachycardic on arrival  Elevated procal but no leukocytosis  Given concern for infection 2/2 problem 3, patient was empircally started on Ceftriaxone  BCx positive for fusobacteria mortiferum (problem 2)  Urine cultures positive for Klebsiella and Enterobacter  CT C/A/P with evidence of loculated fluid collection within the pelvic cul-de-sac and new collection in the left paracolic gutter  S/P IR drainage and culture collection x2 on 10/13 with both the perinephric and paracolic drainage sites growing enterobacter aerogenes and enterococcus avium susceptible to Zosyn  Throughout the weekend and still today, the patient has continued to have progressive decline with worsening fatigue and weakness  Likely multifactorial in nature especially in the presence of problem 5  Given decline and significant comorbidities, would strongly advise goals of care discussion  Discontinue Zosyn and transition to Flagyl 500 mg q8 hours              Monitor WBC and trend fever curve              Repeat labs tomorrow - CBC w/ diff and BMP              Ongoing urology and nephrology follow up               Goals of care discussion with patient and family               Monitor abdominal/flank exam with low threshold for reimaging               Supportive care per primary team      2  Fusobacterium bacteremia: Initial blood cultures positive 2/2  Rare cause of bacterium and atypical isolate and contaminate  Most typically due to urinary track or oral infection  UCx with no growth of this organism and patient with no teeth or signs of oral abscess  Repeat BCx with negative growth   CT A/P on 10/14 identifying new fluid collection and patient now s/p drainage from both the paracolic and perinephric fluid collections  Cultures showing no growth of this organism  Source remains unknown  Possibly transient due to problem 1  However given patient's recent episodes of melena, possibility for a GI tract source still remains  Antibiotics as above             Transition to flagyl to complete a 2 week course of anaerobic coverage              Monitor WBC and trend fever curve              Follow up pending cultures              Supportive care per primary team      3  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: Patient presenting with worsening flank and abdominal pain  CT abdomen/pelvis demonstrating hematoma in the left renal fosa with extension of the hemorrhage/thickening suggesting possible hemoperitoneum  Patient recently underwent PCN and is likely cause of bleed, especially in setting of chronic AC  S/P IR drainage on 10/14 with cultures growing enterobacter and enterococcus  S/P course of Zosyn  Antibiotics as above; Follow up pending anaerobic cultures              Ongoing follow up by IR, urology, and general surgery               Monitor abdominal/flank exam              Supportive care per primary team      4  Small left pleural effusion: Noted to have small left pleural effusion  S/P OR thoracentesis on 10/11 with removal of 90 cc of nikos fluid  Cultures negative  Antibiotics as above              Monitor WBC and trend fever curve              Monitor O2 saturations              Supportive care per primary team      5  Chronic kidney disease with fistula: History of CKD V nearing hemodialysis  Worsening creatinine parallels declining mental status  Fistula present in the RUE with evidence of worsening swelling  Duplex US completed earlier in admission noted 2/2 vein stenosis - vascular surgery and IR following  Oliguric and increase lethargy with concern for uremia, possibly 2/2 ATN from zon   Nephrology consulted with plans to initiate HD today  No need to renally adjust antibiotics              Monitor urine output              Repeat morning labs              Ongoing follow up by nephrology and vascular surgery    HD per nephrology team               Avoid upper extremity PICC line     6  Transaminitis: Patient noted to have mild transaminitis which has since improved  Occurring the setting of continued diarrhea  No liver or bilary treat involvement noted on recent CT  LFTs improved at this time  Monitor LFTs              Supportive care per primary team      7  History of C  Difficile collitis in the setting of continued diarrhea  History of C Diff in 2019  C  Diff studies this admission negative  Antibiotics as above              Monitor stool output              No indication for C diff prophylaxis     8  Melena: Noted to have multiple episodes of black, tarry odorous stool  FOBT positive and hemoglobin labile  Off all anticoagulation this admission  Given no source of problem 2, concern for possible GI tract source given new onset melena  Monitor stool output               Monitor and trend H&H; transfusions per primary team               Gastroenterology following; appreciate input     9  History of PE: History of chronic saddle PE  On Eliquis 2 5 mg BID at home  Anticoagulation per primary team      10  Polycythemia vera and MDS: Follows with hematology/oncology as an outpatient and was on Jakafi  Ongoing follow up with hematology oncology               Supportive care per primary team     Antibiotics:  Total days: 12  Current:  Day 1 - Flagyl 500 mg q8 hours     Thank you for involving us in the care of Ms Zoya Wong  Plan as above to be discussed with Infectious Disease attending  Please await for final attestation  Subjective:  Patient seen and examined at bedside  Saturating appropriately on RA   In no acute distress or discomfort  Remains very lethargic with minimal participation in conversation  Denies pain but states she is feeling extremely week and tired  Lack appetite with minimal PO intake  Objective:  Vitals:  Temp:  [96 3 °F (35 7 °C)-97 4 °F (36 3 °C)] 97 4 °F (36 3 °C)  HR:  [69-99] 69  Resp:  [15-18] 16  BP: (82-98)/(52-63) 97/53  SpO2:  [95 %-100 %] 100 %  Temp (24hrs), Av °F (36 1 °C), Min:96 3 °F (35 7 °C), Max:97 4 °F (36 3 °C)  Current: Temperature: (!) 97 4 °F (36 3 °C)    Physical Exam:   General Appearance: Will awake to tactile and verbal stimulation easily  Remains very lethargic - falling asleep throughout encounter  Minimally interactive  Throat: Oropharynx moist without lesions  Lungs:   Poor respiratory effort  Clear to auscultation bilaterally; no wheezes, rhonchi or rales; respirations unlabored  Saturating appropriately on RA  Heart:  RRR; no murmur, rub or gallop   Abdomen:   Soft, non-tender, non-distended, positive bowel sounds  Ureteroscopy tube in place with minimal output  Two left flank drains in place with minimal output  Extremities: Cool upper extremities bilaterally, R>L  Continued significant swelling in the RUE with blister formation  RUE fistula present with palpable thrill  Skin: No new rashes or lesions  No draining wounds noted  Skin tear on anterior chest - bandaged and clean, dry, intact  Significant ecchymosis on the LUE          Labs, Imaging, & Other studies:   All pertinent labs and imaging studies were personally reviewed  Results from last 7 days   Lab Units 10/20/21  0600 10/19/21  0600 10/18/21  1504   WBC Thousand/uL 11 25* 9 27 9 86   HEMOGLOBIN g/dL 8 5* 9 0* 8 8*   PLATELETS Thousands/uL 127* 122* 123*     Results from last 7 days   Lab Units 10/20/21  0600 10/19/21  0600 10/18/21  0614   SODIUM mmol/L 138 137 139   POTASSIUM mmol/L 4 2 4 2 3 9   CHLORIDE mmol/L 103 105 107   CO2 mmol/L 20* 19* 17*   BUN mg/dL 128* 129* 120* CREATININE mg/dL 6 78* 6 40* 5 83*   EGFR ml/min/1 73sq m 5 6 7   CALCIUM mg/dL 8 3 8 6 8 5   AST U/L 39 34 39   ALT U/L <6* 7* 7*   ALK PHOS U/L 210* 197* 210*     Results from last 7 days   Lab Units 10/13/21  1928 10/13/21  1923   GRAM STAIN RESULT  1+ Polys*  2+ Gram positive cocci in pairs*  2+ Gram positive rods*  1+ Gram negative rods* No Polys or Bacteria seen   BODY FLUID CULTURE, STERILE  4+ Growth of Enterobacter aerogenes*  4+ Growth of Enterococcus avium* 4+ Growth of Enterobacter aerogenes*  4+ Growth of Enterococcus avium*

## 2021-10-21 ENCOUNTER — ANESTHESIA EVENT (INPATIENT)
Dept: GASTROENTEROLOGY | Facility: HOSPITAL | Age: 76
DRG: 981 | End: 2021-10-21
Payer: COMMERCIAL

## 2021-10-21 ENCOUNTER — APPOINTMENT (INPATIENT)
Dept: GASTROENTEROLOGY | Facility: HOSPITAL | Age: 76
DRG: 981 | End: 2021-10-21
Payer: COMMERCIAL

## 2021-10-21 ENCOUNTER — APPOINTMENT (INPATIENT)
Dept: DIALYSIS | Facility: HOSPITAL | Age: 76
DRG: 981 | End: 2021-10-21
Payer: COMMERCIAL

## 2021-10-21 ENCOUNTER — ANESTHESIA EVENT (INPATIENT)
Dept: ANESTHESIOLOGY | Facility: HOSPITAL | Age: 76
DRG: 981 | End: 2021-10-21
Payer: COMMERCIAL

## 2021-10-21 ENCOUNTER — ANESTHESIA (INPATIENT)
Dept: ANESTHESIOLOGY | Facility: HOSPITAL | Age: 76
DRG: 981 | End: 2021-10-21
Payer: COMMERCIAL

## 2021-10-21 ENCOUNTER — ANESTHESIA (INPATIENT)
Dept: GASTROENTEROLOGY | Facility: HOSPITAL | Age: 76
DRG: 981 | End: 2021-10-21
Payer: COMMERCIAL

## 2021-10-21 LAB
ALBUMIN SERPL BCP-MCNC: 1.5 G/DL (ref 3.5–5)
ALP SERPL-CCNC: 208 U/L (ref 46–116)
ALT SERPL W P-5'-P-CCNC: <6 U/L (ref 12–78)
ANION GAP SERPL CALCULATED.3IONS-SCNC: 12 MMOL/L (ref 4–13)
AST SERPL W P-5'-P-CCNC: 50 U/L (ref 5–45)
BASOPHILS # BLD AUTO: 0.02 THOUSANDS/ΜL (ref 0–0.1)
BASOPHILS NFR BLD AUTO: 0 % (ref 0–1)
BILIRUB SERPL-MCNC: 0.6 MG/DL (ref 0.2–1)
BUN SERPL-MCNC: 92 MG/DL (ref 5–25)
CALCIUM ALBUM COR SERPL-MCNC: 9.9 MG/DL (ref 8.3–10.1)
CALCIUM SERPL-MCNC: 7.9 MG/DL (ref 8.3–10.1)
CHLORIDE SERPL-SCNC: 104 MMOL/L (ref 100–108)
CO2 SERPL-SCNC: 21 MMOL/L (ref 21–32)
CREAT SERPL-MCNC: 5.29 MG/DL (ref 0.6–1.3)
EOSINOPHIL # BLD AUTO: 0.03 THOUSAND/ΜL (ref 0–0.61)
EOSINOPHIL NFR BLD AUTO: 0 % (ref 0–6)
ERYTHROCYTE [DISTWIDTH] IN BLOOD BY AUTOMATED COUNT: 19.4 % (ref 11.6–15.1)
GFR SERPL CREATININE-BSD FRML MDRD: 7 ML/MIN/1.73SQ M
GLUCOSE SERPL-MCNC: 87 MG/DL (ref 65–140)
HCT VFR BLD AUTO: 26.8 % (ref 34.8–46.1)
HGB BLD-MCNC: 8.2 G/DL (ref 11.5–15.4)
IMM GRANULOCYTES # BLD AUTO: 0.27 THOUSAND/UL (ref 0–0.2)
IMM GRANULOCYTES NFR BLD AUTO: 3 % (ref 0–2)
LYMPHOCYTES # BLD AUTO: 0.99 THOUSANDS/ΜL (ref 0.6–4.47)
LYMPHOCYTES NFR BLD AUTO: 9 % (ref 14–44)
MCH RBC QN AUTO: 28.5 PG (ref 26.8–34.3)
MCHC RBC AUTO-ENTMCNC: 30.6 G/DL (ref 31.4–37.4)
MCV RBC AUTO: 93 FL (ref 82–98)
MONOCYTES # BLD AUTO: 0.84 THOUSAND/ΜL (ref 0.17–1.22)
MONOCYTES NFR BLD AUTO: 8 % (ref 4–12)
NEUTROPHILS # BLD AUTO: 8.83 THOUSANDS/ΜL (ref 1.85–7.62)
NEUTS SEG NFR BLD AUTO: 80 % (ref 43–75)
NRBC BLD AUTO-RTO: 1 /100 WBCS
PLATELET # BLD AUTO: 96 THOUSANDS/UL (ref 149–390)
PMV BLD AUTO: 13.4 FL (ref 8.9–12.7)
POTASSIUM SERPL-SCNC: 3.6 MMOL/L (ref 3.5–5.3)
PROT SERPL-MCNC: 5.1 G/DL (ref 6.4–8.2)
RBC # BLD AUTO: 2.88 MILLION/UL (ref 3.81–5.12)
SODIUM SERPL-SCNC: 137 MMOL/L (ref 136–145)
WBC # BLD AUTO: 10.98 THOUSAND/UL (ref 4.31–10.16)

## 2021-10-21 PROCEDURE — 90935 HEMODIALYSIS ONE EVALUATION: CPT | Performed by: INTERNAL MEDICINE

## 2021-10-21 PROCEDURE — 99232 SBSQ HOSP IP/OBS MODERATE 35: CPT | Performed by: INTERNAL MEDICINE

## 2021-10-21 PROCEDURE — 43235 EGD DIAGNOSTIC BRUSH WASH: CPT | Performed by: INTERNAL MEDICINE

## 2021-10-21 PROCEDURE — 85025 COMPLETE CBC W/AUTO DIFF WBC: CPT | Performed by: INTERNAL MEDICINE

## 2021-10-21 PROCEDURE — NC001 PR NO CHARGE: Performed by: INTERNAL MEDICINE

## 2021-10-21 PROCEDURE — 5A1D70Z PERFORMANCE OF URINARY FILTRATION, INTERMITTENT, LESS THAN 6 HOURS PER DAY: ICD-10-PCS | Performed by: RADIOLOGY

## 2021-10-21 PROCEDURE — 80053 COMPREHEN METABOLIC PANEL: CPT | Performed by: INTERNAL MEDICINE

## 2021-10-21 PROCEDURE — 0DJ08ZZ INSPECTION OF UPPER INTESTINAL TRACT, VIA NATURAL OR ARTIFICIAL OPENING ENDOSCOPIC: ICD-10-PCS | Performed by: INTERNAL MEDICINE

## 2021-10-21 PROCEDURE — C9113 INJ PANTOPRAZOLE SODIUM, VIA: HCPCS | Performed by: INTERNAL MEDICINE

## 2021-10-21 RX ORDER — SODIUM CHLORIDE 9 MG/ML
INJECTION, SOLUTION INTRAVENOUS CONTINUOUS PRN
Status: DISCONTINUED | OUTPATIENT
Start: 2021-10-21 | End: 2021-10-21

## 2021-10-21 RX ORDER — PROPOFOL 10 MG/ML
INJECTION, EMULSION INTRAVENOUS AS NEEDED
Status: DISCONTINUED | OUTPATIENT
Start: 2021-10-21 | End: 2021-10-21

## 2021-10-21 RX ORDER — KETAMINE HCL IN NACL, ISO-OSM 100MG/10ML
SYRINGE (ML) INJECTION AS NEEDED
Status: DISCONTINUED | OUTPATIENT
Start: 2021-10-21 | End: 2021-10-21

## 2021-10-21 RX ADMIN — METOPROLOL TARTRATE 25 MG: 25 TABLET, FILM COATED ORAL at 08:07

## 2021-10-21 RX ADMIN — METRONIDAZOLE 500 MG: 500 TABLET ORAL at 05:12

## 2021-10-21 RX ADMIN — Medication 250 MG: at 08:07

## 2021-10-21 RX ADMIN — PANTOPRAZOLE SODIUM 40 MG: 40 INJECTION, POWDER, FOR SOLUTION INTRAVENOUS at 23:38

## 2021-10-21 RX ADMIN — PANTOPRAZOLE SODIUM 40 MG: 40 INJECTION, POWDER, FOR SOLUTION INTRAVENOUS at 08:07

## 2021-10-21 RX ADMIN — METOPROLOL TARTRATE 25 MG: 25 TABLET, FILM COATED ORAL at 17:37

## 2021-10-21 RX ADMIN — PROPOFOL 10 MG: 10 INJECTION, EMULSION INTRAVENOUS at 14:02

## 2021-10-21 RX ADMIN — ATORVASTATIN CALCIUM 40 MG: 40 TABLET, FILM COATED ORAL at 23:37

## 2021-10-21 RX ADMIN — Medication 15 MG: at 14:00

## 2021-10-21 RX ADMIN — METRONIDAZOLE 500 MG: 500 TABLET ORAL at 15:24

## 2021-10-21 RX ADMIN — ACETAMINOPHEN 650 MG: 325 TABLET, FILM COATED ORAL at 12:15

## 2021-10-21 RX ADMIN — APIXABAN 2.5 MG: 2.5 TABLET, FILM COATED ORAL at 23:06

## 2021-10-21 RX ADMIN — METRONIDAZOLE 500 MG: 500 TABLET ORAL at 23:37

## 2021-10-21 RX ADMIN — CITALOPRAM HYDROBROMIDE 20 MG: 20 TABLET ORAL at 08:07

## 2021-10-21 RX ADMIN — LEVOTHYROXINE SODIUM 75 MCG: 75 TABLET ORAL at 08:07

## 2021-10-21 RX ADMIN — SODIUM CHLORIDE: 0.9 INJECTION, SOLUTION INTRAVENOUS at 13:45

## 2021-10-21 RX ADMIN — CALCITRIOL 0.25 MCG: 0.25 CAPSULE, LIQUID FILLED ORAL at 08:07

## 2021-10-21 RX ADMIN — MELATONIN TAB 3 MG 3 MG: 3 TAB at 23:37

## 2021-10-21 NOTE — ASSESSMENT & PLAN NOTE
Blood cultures from 10/9 growing Fusobacterium - IV Zosyn transitioned to PO Flagyl per Infectious Disease  Repeat blood cultures 10/12 are negative

## 2021-10-21 NOTE — ASSESSMENT & PLAN NOTE
Progressively worsening renal failure  Son aware of poor/guarded prognosis - appreciate nephrology input -> underwent tunneled catheter placement yesterday w/ subsequent initiation of dialysis  Monitor renal function and urine output - limit/avoid nephrotoxins if possible - avoid hypotension as possible  History of obstructive uropathy secondary to left-sided hydronephrosis s/p stenting noted - recent CT imaging on 10/13 revealed resolution of hydronephrosis -> progressive increase in creatinine still noted, making obstructive etiology less likely  H/o ileal conduit noted as well

## 2021-10-21 NOTE — ASSESSMENT & PLAN NOTE
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

## 2021-10-21 NOTE — ASSESSMENT & PLAN NOTE
Previously with fever coupled with tachycardia and elevated procalcitonin  Likely secondary to infected left renal hematoma -> Fusobacterium bacteremia noted - fluid culture from 10/13 s/p IR-guided drainage growing Enterobacter/Enterococcus  IV Zosyn regimen per Infectious Disease transitioned to PO Flagyl as patient to be initiated on hemodialysis  Continue to monitor vitals and maintain hemodynamics

## 2021-10-21 NOTE — ASSESSMENT & PLAN NOTE
Appreciate gastroenterology input -> underwent endoscopy today without evidence of acute bleeding  Monitor H/H  Okay to resume Eliquis per gastroenterology  C/w PPI regimen - hiatal hernia noted on endoscopy

## 2021-10-21 NOTE — PROGRESS NOTES
Progress Note- Carroll Padilla 77 y o  female MRN: [id number]    Unit/Bed#: [facility] 929-01 Encounter: [id number]      Assessment and Plan:    Melena    77year old female with PMHx of HTN, CKD, pulmonary embolism on Eliquis, polycythemia vera and obstructive uropathy complicated by hydronephrosis status post stent placement complicated by hematoma of the left kidney status post drainage by IR  Patient was noted to have black/tarry stools on 10/17-10/18 and FOBT positive, we were consulted for melena  Her hemoglobin has been ranging between 6-9 during this admission  Suspected anemia in the setting of recent hematoma  Anticoagulation is on hold  No more black/tarry stools reported in the last 2 days and hemoglobin stable at 8-8 5     · NPO  · Endoscopy today    ______________________________________________________________________    Subjective:     Patient seen and examined at bedside  She reports feeling "tired"  Denies nausea, vomiting or abdominal pain  No other complaints reported       Medication Administration - last 24 hours from 10/20/2021 0907 to 10/21/2021 7014       Date/Time Order Dose Route Action Action by     10/20/2021 2211 atorvastatin (LIPITOR) tablet 40 mg 40 mg Oral Given Julia Santos RN     10/21/2021 5614 calcitriol (ROCALTROL) capsule 0 25 mcg 0 25 mcg Oral Given Michelle Delong RN     10/20/2021 1219 calcitriol (ROCALTROL) capsule 0 25 mcg 0 25 mcg Oral Given Carlos Parker RN     10/21/2021 0933 cholecalciferol (VITAMIN D) oral liquid 800 Units 800 Units Oral Given Michelle Delong RN     10/20/2021 1219 cholecalciferol (VITAMIN D) oral liquid 800 Units 800 Units Oral Given Carlos Parker RN     10/21/2021 8763 citalopram (CeleXA) tablet 20 mg 20 mg Oral Given Analilia Napier RN     10/20/2021 1210 citalopram (CeleXA) tablet 20 mg 20 mg Oral Given Carlos Parker RN     10/21/2021 2335 levothyroxine tablet 75 mcg 75 mcg Oral Given Analilia Napier RN     10/20/2021 1210 levothyroxine tablet 75 mcg 75 mcg Oral Given Wilfrido Joy, JOHNATHAN     10/20/2021 2210 melatonin tablet 3 mg 3 mg Oral Not Given Kofi Church, JOHNATHAN     10/21/2021 9105 saccharomyces boulardii (FLORASTOR) capsule 250 mg 250 mg Oral Given Analilia Napier, RN     10/20/2021 1210 saccharomyces boulardii (FLORASTOR) capsule 250 mg 250 mg Oral Given Wilfrido Joy, JOHNATHAN     10/21/2021 7955 docusate sodium (COLACE) capsule 100 mg 100 mg Oral Not Given Artem Romo, RN     10/20/2021 1831 docusate sodium (COLACE) capsule 100 mg 100 mg Oral Not Given Wilfrido Joy, JOHNATHAN     10/20/2021 1217 docusate sodium (COLACE) capsule 100 mg 100 mg Oral Refused Wilfrido Joy, JOHNATHAN     10/21/2021 2722 senna (SENOKOT) tablet 8 6 mg 8 6 mg Oral Not Given Artem Romo, JOHNATHAN     10/20/2021 1217 senna (SENOKOT) tablet 8 6 mg 8 6 mg Oral Refused Wilfrido Joy, JOHNATHAN     10/20/2021 2330 piperacillin-tazobactam (ZOSYN) 2 25 g in sodium chloride 0 9 % 50 mL IVPB 2 25 g Intravenous New Bag Kofi Church, JOHNATHAN     10/20/2021 1754 piperacillin-tazobactam (ZOSYN) 2 25 g in sodium chloride 0 9 % 50 mL IVPB 2 25 g Intravenous New Bag Wilfrido Joy, JOHNATHAN     10/20/2021 1204 piperacillin-tazobactam (ZOSYN) 2 25 g in sodium chloride 0 9 % 50 mL IVPB 2 25 g Intravenous [address] [facility]     10/20/2021 1618 metoprolol (LOPRESSOR) injection 2 5 mg 2 5 mg Intravenous Given Janine Landin, JOHNATHAN     10/20/2021 2211 sodium bicarbonate 75 mEq in sodium chloride 0 45 % 1,000 mL infusion 50 mL/hr Intravenous [address] Kofi Church, JOHNATHAN     10/21/2021 6742 metoprolol tartrate (LOPRESSOR) tablet 25 mg 25 mg Oral Given Analiliajonathon Napier, RN     10/20/2021 1833 metoprolol tartrate (LOPRESSOR) tablet 25 mg 25 mg Oral Given Wilfrido Joy RN     10/20/2021 1211 metoprolol tartrate (LOPRESSOR) tablet 25 mg 25 mg Oral Not Given Wilfrido Joy RN     10/21/2021 9953 pantoprazole (PROTONIX) injection 40 mg 40 mg Intravenous Given Analilia Napier RN     10/20/2021 2211 pantoprazole (PROTONIX) injection 40 mg 40 mg Intravenous Given Kofi Church RN 10/20/2021 1210 pantoprazole (PROTONIX) injection 40 mg 40 mg Intravenous Given Jennifer Thornton, JOHNATHAN     10/21/2021 2382 metroNIDAZOLE (FLAGYL) tablet 500 mg 500 mg Oral Given Elias Frazier RN     10/20/2021 1436 fentanyl citrate (PF) 100 MCG/2ML 25 mcg Intravenous Given Adali Sotelo RN     10/20/2021 1419 fentanyl citrate (PF) 100 MCG/2ML 25 mcg Intravenous Given Adali Sotelo RN          Objective:     Vitals: Blood pressure 137/66, pulse 76, temperature (!) 96 4 °F (35 8 °C), resp  rate 20, height 5' (1 524 m), weight 86 2 kg (190 lb), SpO2 94 %, not currently breastfeeding  ,Body mass index is 37 11 kg/m²  Intake/Output Summary (Last 24 hours) at 10/21/2021 0907  Last data filed at 10/20/2021 1724  Gross per 24 hour   Intake 500 ml   Output 1500 ml   Net -1000 ml       Physical Exam:   General Appearance: Awake and alert, in no acute distress  Abdomen: Soft, non-tender, non-distended; bowel sounds normal; no masses or no organomegaly    Invasive Devices     Peripherally Inserted Central Catheter Line            PICC Line 10/11/21 9 days          Line            Hemodialysis AV Fistula 09/30/21 Right Upper arm 21 days          Hemodialysis Catheter            HD Permanent Double Catheter <1 day          Drain            Urostomy Ileal conduit RUQ 1842 days    Closed/Suction Drain Left Other (Comment) 7 days    Closed/Suction Drain Left Other (Comment) 10 Fr  7 days                Lab Results:  No results displayed because visit has over 200 results  Imaging Studies: I have personally reviewed pertinent imaging studies

## 2021-10-21 NOTE — QUICK NOTE
GI Quick Note   ---------------------    - Patient underwent EGD today which didn't show any active bleeding    - Hgb has remained stable   - Can restart anticoagulation if needed     GI will sign off at this time  Please call us if any questions or concerns       Katharine Rajan MD   Gastroenterology Fellow

## 2021-10-21 NOTE — PROGRESS NOTES
NEPHROLOGY PROGRESS NOTE    Justino Tenorio 77 y o  female MRN: [id number]  Unit/Bed#: [facility] 929-01 Encounter: [id number]  Reason for Consult:  Acute on chronic kidney disease with uremia    The patient was awake and alert says she tolerated the finish to dialysis yesterday  She is scheduled for treatment today and was seen whil on treatment  He does not feel any significant improvement in her status yet  Otherwise no distress  ASSESSMENT/PLAN:  1  Renal    The patient developed acute renal failure on significant chronic kidney disease likely due to ATN  Patient a complicated medical course while hospitalized with bacteremia infected left renal hematoma chronic obstructive uropathy  She developed progressive oliguria worsening renal function weakness no appetite which were representative of uremic symptoms  She was initiated on hemodialysis yesterday with no complications and she does have a PermCath  I saw her today on dialysis and details of treatment outline below  There was no noted hydronephrosis on ultrasound  HEMODIALYSIS PROCEDURE NOTE  The patient was seen and examined on hemodialysis  Time: 3 hours  Sodium: 138 Blood flow: 300   Dialyzer: F160 Potassium: 3 Dialysate flow: 600   Access: catheter Bicarbonate: 35 Ultrafiltration goal: 2        Monitor hemodynamically  Case management consult to start to arrange outpatient dialysis prior to discharge  Discontinue IV fluids  Plan for next dialysis on Saturday  2  Infectious Disease    Patient has sepsis related to bacteremia  Patient to complete 14 day course of antibiotics per Infectious Disease recommendations  Also has infected left renal hematoma    Ultrasound report was reviewed and there was partially visualized left perinephric drainage catheter with some residual collection  There was also an unchanged collection in the right lower quadrant  SUBJECTIVE:  Review of Systems   Constitutional: Positive for decreased appetite and malaise/fatigue  Negative for chills, fever and night sweats  HENT: Negative  Eyes: Negative  Cardiovascular: Positive for leg swelling  Negative for chest pain, dyspnea on exertion and orthopnea  Respiratory: Negative  Negative for cough, shortness of breath, sputum production and wheezing  Gastrointestinal: Positive for anorexia  Negative for abdominal pain, diarrhea, nausea and vomiting  Genitourinary: Negative  Neurological: Positive for weakness  Negative for dizziness, focal weakness, headaches and light-headedness  Psychiatric/Behavioral: Negative for altered mental status, hallucinations and hypervigilance  The patient is not nervous/anxious  OBJECTIVE:  Current Weight: Weight - Scale: 86 2 kg (190 lb)  Vitals:Temp (24hrs), Av 8 °F (36 °C), Min:96 °F (35 6 °C), Max:98 °F (36 7 °C)  Current: Temperature: (!) 96 4 °F (35 8 °C)   Blood pressure 137/66, pulse 76, temperature (!) 96 4 °F (35 8 °C), resp  rate 20, height 5' (1 524 m), weight 86 2 kg (190 lb), SpO2 94 %, not currently breastfeeding  , Body mass index is 37 11 kg/m²  Intake/Output Summary (Last 24 hours) at 10/21/2021 1005  Last data filed at 10/20/2021 1724  Gross per 24 hour   Intake 500 ml   Output 1500 ml   Net -1000 ml       Physical Exam: /66   Pulse 76   Temp (!) 96 4 °F (35 8 °C)   Resp 20   Ht 5' (1 524 m)   Wt 86 2 kg (190 lb)   SpO2 94%   BMI 37 11 kg/m²   Physical Exam  Constitutional:       General: She is not in acute distress  Appearance: She is not toxic-appearing or diaphoretic  HENT:      Head: Normocephalic and atraumatic  Mouth/Throat:      Mouth: Mucous membranes are dry  Eyes:      General: No scleral icterus  Extraocular Movements: Extraocular movements intact     Cardiovascular: Rate and Rhythm: Normal rate and regular rhythm  Heart sounds: No friction rub  No gallop  Comments: Positive edema  Pulmonary:      Effort: Pulmonary effort is normal  No respiratory distress  Breath sounds: No wheezing, rhonchi or rales  Comments: Decreased breath sounds bases  Abdominal:      General: Bowel sounds are normal  There is no distension  Palpations: Abdomen is soft  Tenderness: There is no abdominal tenderness  There is no rebound  Musculoskeletal:      Cervical back: Normal range of motion and neck supple  Neurological:      Mental Status: She is alert and oriented to person, place, and time  Psychiatric:         Mood and Affect: Mood normal          Thought Content:  Thought content normal          Medications:    Current Facility-Administered Medications:     acetaminophen (TYLENOL) tablet 650 mg, 650 mg, Oral, Q6H PRN, Rosa Maria Garcia MD, 650 mg at 10/11/21 1739    aluminum-magnesium hydroxide-simethicone (MYLANTA) oral suspension 30 mL, 30 mL, Oral, Q6H PRN, Rosa Maria Garcia MD    atorvastatin (LIPITOR) tablet 40 mg, 40 mg, Oral, HS, Rosa Maria Garcia MD, 40 mg at 10/20/21 2211    calcitriol (ROCALTROL) capsule 0 25 mcg, 0 25 mcg, Oral, Daily, Rosa Maria Garcia MD, 0 25 mcg at 10/21/21 0807    cholecalciferol (VITAMIN D) oral liquid 800 Units, 800 Units, Oral, Daily, Rosa Maria Garcia MD, 800 Units at 10/21/21 0808    citalopram (CeleXA) tablet 20 mg, 20 mg, Oral, Daily, Rosa Maria Garcia MD, 20 mg at 10/21/21 0807    docusate sodium (COLACE) capsule 100 mg, 100 mg, Oral, BID, Rosa Maria Garcia MD, 100 mg at 10/17/21 1140    levothyroxine tablet 75 mcg, 75 mcg, Oral, Daily, Rosa Maria Garcia MD, 75 mcg at 10/21/21 0807    melatonin tablet 3 mg, 3 mg, Oral, HS, Rosa Maria Garcia MD, 3 mg at 10/19/21 2158    metoprolol (LOPRESSOR) injection 2 5 mg, 2 5 mg, Intravenous, Q6H PRN, Dior Gomes PA-C, 2 5 mg at 10/20/21 1618    metoprolol tartrate (LOPRESSOR) tablet 25 mg, 25 mg, Oral, BID, Ellyn Aschoff, PA-C, 25 mg at 10/21/21 0807    metroNIDAZOLE (FLAGYL) tablet 500 mg, 500 mg, Oral, Q8H [address], Erlinda Delgado MD, 500 mg at 10/21/21 0512    ondansetron (ZOFRAN) injection 4 mg, 4 mg, Intravenous, Q6H PRN, Tristin Zhang MD, 4 mg at 10/14/21 2153    pantoprazole (PROTONIX) injection 40 mg, 40 mg, Intravenous, Q12H [address], Mumtaz Laguerre DO, 40 mg at 10/21/21 0807    saccharomyces boulardii (FLORASTOR) capsule 250 mg, 250 mg, Oral, Daily, Tristin Zhang MD, 250 mg at 10/21/21 0807    senna (SENOKOT) tablet 8 6 mg, 1 tablet, Oral, Daily, Tristin Zhang MD, 8 6 mg at 10/17/21 1140    sodium bicarbonate 75 mEq in sodium chloride 0 45 % 1,000 mL infusion, 50 mL/hr, Intravenous, Continuous, Dallas Spencer DO, Last Rate: 50 mL/hr at 10/20/21 2211, 50 mL/hr at 10/20/21 2211    Laboratory Results:  Lab Results   Component Value Date    WBC 11 25 (H) 10/20/2021    HGB 8 5 (L) 10/20/2021    HCT 27 9 (L) 10/20/2021    MCV 93 10/20/2021     (L) 10/20/2021     Lab Results   Component Value Date    SODIUM 138 10/20/2021    K 4 2 10/20/2021     10/20/2021    CO2 20 (L) 10/20/2021     (H) 10/20/2021    CREATININE 6 78 (H) 10/20/2021    GLUC 90 10/20/2021    CALCIUM 8 3 10/20/2021     Lab Results   Component Value Date    CALCIUM 8 3 10/20/2021    PHOS 7 1 (H) 10/18/2021     No results found for: LABPROT

## 2021-10-21 NOTE — DISCHARGE INSTR - OTHER ORDERS
Skin care plans:  1-Hydraguard to bilateral sacrum, buttock BID and PRN  2-Elevate heels to offload pressure  3-Ehob cushion in chair when out of bed  4-Moisturize skin daily with skin nourishing cream   5-Turn/reposition q2h or when medically stable for pressure re-distribution on skin  6-Allevyn foam to heels, amado w/P, peel foam check skin integrity q-shift  Change q3d  7-Skin tear chest- Cleanse with Normal saline then apply Xeroform and apply allevyn foam amado with a T and date change every other day   8- Left arm- Paint dry eschar with 3M no sting daily  Okay to keep YARELY

## 2021-10-21 NOTE — CASE MANAGEMENT
CM called pt's son to discuss discharge plan  Pt's son confirmed that pt's will most likely go home with family at d/c  Per pt's son pt will reside at [address] [place] [id number]  CM and pt's son also discussed pt's need for OP dialysis  Per pt's son he and his wife would be able to provide transport to the dialysis appointments and they had no preference for morning or afternoon appointments  CM will follow

## 2021-10-21 NOTE — PHYSICAL THERAPY NOTE
Physical Therapy Cancellation Note    Patient's Name: Carroll Padilla       10/21/21 1128   Note Type   Note Type Treatment   Cancel Reasons Patient off floor/test     Attempted to see for PT treatment  Pt off the floor for EGD  Will f/u as time permits      Celestine Spivey, PT, DPT

## 2021-10-21 NOTE — PROGRESS NOTES
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)  Urine cultures positive for Klebsiella and Enterobacter  CT C/A/P with evidence of loculated fluid collection within the pelvic cul-de-sac and new collection in the left paracolic gutter  S/P IR drainage and culture collection x2 on 10/13 with both the perinephric and paracolic drainage sites growing enterobacter aerogenes and enterococcus avium susceptible to Zosyn  Throughout the weekend and still today, the patient has continued to have progressive decline with worsening fatigue and weakness  Likely multifactorial in nature especially in the presence of problem 5  Given decline and significant comorbidities, would strongly advise goals of care discussion  Discontinue Zosyn and transition to Flagyl 500 mg q8 hours until 10/25              Monitor WBC and trend fever curve              Repeat labs tomorrow - CBC w/ diff and BMP              Ongoing urology and nephrology follow up               Goals of care discussion with patient and family               Monitor abdominal/flank exam with low threshold for reimaging               Supportive care per primary team      2  Fusobacterium bacteremia: Initial blood cultures positive 2/2  Rare cause of bacterium and atypical isolate and contaminate  Most typically due to urinary track or oral infection  UCx with no growth of this organism and patient with no teeth or signs of oral abscess  Repeat BCx with negative growth   CT A/P on 10/14 identifying new fluid collection and patient now s/p drainage from both the paracolic and perinephric fluid collections  Cultures showing no growth of this organism  Source remains unknown  Possibly transient due to problem 1  However given patient's recent episodes of melena, possibility for a GI tract source still remains  Antibiotics as above              Transition to flagyl to complete a 2 week course of anaerobic coverage until 10/25              Monitor WBC and trend fever curve              Follow up pending cultures              Supportive care per primary team      3  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: Patient presenting with worsening flank and abdominal pain  CT abdomen/pelvis demonstrating hematoma in the left renal fosa with extension of the hemorrhage/thickening suggesting possible hemoperitoneum  Patient recently underwent PCN and is likely cause of bleed, especially in setting of chronic AC  S/P IR drainage on 10/14 with cultures growing enterobacter and enterococcus  S/P course of Zosyn  Antibiotics as above              Ongoing follow up by IR, urology, and general surgery               Monitor abdominal/flank exam              Supportive care per primary team      4  Small left pleural effusion: Noted to have small left pleural effusion  S/P OR thoracentesis on 10/11 with removal of 90 cc of nikos fluid  Cultures negative  Antibiotics as above              Monitor WBC and trend fever curve              Monitor O2 saturations              Supportive care per primary team      5  Chronic kidney disease with fistula: History of CKD V nearing hemodialysis  Worsening creatinine parallels declining mental status  Fistula present in the RUE with evidence of worsening swelling  Duplex US completed earlier in admission noted 2/2 vein stenosis - vascular surgery and IR following  Oliguric and increase lethargy with concern for uremia, possibly 2/2 ATN from zosyn   Nephrology consulted with plans to initiate HD today  No need to renally adjust antibiotics              Monitor urine output              Repeat morning labs              Ongoing follow up by nephrology and vascular surgery               HD per nephrology team               Avoid upper extremity PICC line     6  Transaminitis: Patient noted to have mild transaminitis which has since improved  Occurring the setting of continued diarrhea  No liver or bilary treat involvement noted on recent CT  LFTs improved at this time  Monitor LFTs              Supportive care per primary team      7  History of C  Difficile collitis in the setting of continued diarrhea  History of C Diff in 2019  C  Diff studies this admission negative  Antibiotics as above              Monitor stool output              No indication for C diff prophylaxis     8  Melena: Noted to have multiple episodes of black, tarry odorous stool  FOBT positive and hemoglobin labile  Off all anticoagulation this admission  Given no source of problem 2, concern for possible GI tract source given new onset melena  GI consulted Endoscopy today               Monitor stool output               Monitor and trend H&H; transfusions per primary team               Gastroenterology following; appreciate input     9  History of PE: History of chronic saddle PE  On Eliquis 2 5 mg BID at home  Anticoagulation per primary team      10  Polycythemia vera and MDS: Follows with hematology/oncology as an outpatient and was on Jakafi  Ongoing follow up with hematology oncology               Supportive care per primary team     Antibiotics:    Started 10/9    D 12 total  Continue Flagyl until 10/25    Subjective:  Patient complains of being tired  Denies any bloody bowel movement  Denies any chest pain, respiratory distress     Objective:  Vitals:  Temp:  [96 °F (35 6 °C)-98 °F (36 7 °C)] 96 4 °F (35 8 °C)  HR:  [] 76  Resp:  [15-20] 20  BP: ()/(48-88) 137/66  SpO2:  [93 %-100 %] 94 %  Temp (24hrs), Av 8 °F (36 °C), Min:96 °F (35 6 °C), Max:98 °F (36 7 °C)  Current: Temperature: (!) 96 4 °F (35 8 °C)    Physical Exam:   General Appearance:   no acute distress  Throat: Oropharynx moist without lesions  Lungs:   Clear to auscultation bilaterally; no wheezes, rhonchi or rales; respirations unlabored   Heart:  RRR; no murmur, rub or gallop   Abdomen:   Soft, non-tender, non-distended, positive bowel sounds  Extremities: No clubbing, cyanosis or edema   Skin: No new rashes or lesions  No draining wounds noted         Labs, Imaging, & Other studies:   All pertinent labs and imaging studies were personally reviewed  Results from last 7 days   Lab Units 10/20/21  0600 10/19/21  0600 10/18/21  1504   WBC Thousand/uL 11 25* 9 27 9 86   HEMOGLOBIN g/dL 8 5* 9 0* 8 8*   PLATELETS Thousands/uL 127* 122* 123*     Results from last 7 days   Lab Units 10/20/21  0600 10/19/21  0600 10/18/21  0614   SODIUM mmol/L 138 137 139   POTASSIUM mmol/L 4 2 4 2 3 9   CHLORIDE mmol/L 103 105 107   CO2 mmol/L 20* 19* 17*   BUN mg/dL 128* 129* 120*   CREATININE mg/dL 6 78* 6 40* 5 83*   EGFR ml/min/1 73sq m 5 6 7   CALCIUM mg/dL 8 3 8 6 8 5   AST U/L 39 34 39   ALT U/L <6* 7* 7*   ALK PHOS U/L 210* 197* 210*

## 2021-10-21 NOTE — OCCUPATIONAL THERAPY NOTE
Occupational Therapy Cancel Note        Patient Name: Elham Goldberg  LSHHR'W Date: 10/21/2021       10/21/21 1127   OT Last Visit   OT Visit Date 10/21/21   Note Type   Note Type Treatment   Cancel Reasons Patient off floor/test       OT orders received  Chart reviewed  Pt currently off floor for EGD  Will continue to follow and see pt as appropriate and able       Dalia Henderson MS, OTR/L

## 2021-10-21 NOTE — WOUND OSTOMY CARE
Progress Note - Wound   Pam Guzman 77 y o  female MRN: [id number]  Unit/Bed#: [facility] 929-01 Encounter: [id number]      Assessment:   Patient admitted due to sepsis  History of CKD stage IV, COVID-19, HTN, IBS, amanda mass, shingles  Wound care follow-up for skin tears and buttock wound  Patient agreeable to assessment, alert and oriented x2 with confusion, assist of 1 to turn for assessment, wedges in use for turning and repositioning, heels elevated off of mattress, incontinent of bowel, Urostomy pouch is dry and intact with no sign of leaking, patient receives hemodialysis, is dependent for care  1  Right buttock- On assessment, skin is dry, intact, blanchable hyperpigmented skin and blanchable pink skin, no open aspects  Wound has resolved  2  Skin tear to sternum, category 3 skin tear- Wound is oval in shape, moist, partial thickness, 100% beefy red tissue, scant amount of serosanguineous drainage  Fina-wound is dry, intact, purple ecchymosis, no redness  3  Left forearm skin tear- Partial skin flap is well adhered  Wound bed is irregular in shape, is covered 100% in dry brown/black eschar vs dry bloody drainage  No drainage  True depth of wound unknown due to devitalized tissue obscuring wound bed  Fina-wound is dry, intact, no redness  4  Bilateral sacrum, buttock, and heels are dry, intact, blanchable pink skin, blanchable hyperpigmented skin  Educated patient on importance of frequent offloading of pressure via turning, repositioning, and weight-shifting  No induration, fluctuance, odor, warmth, redness, or purulence noted to the above noted wounds  New dressings applied  Patient tolerated well  Primary nurse aware of plan of care  See flow sheets for more detailed assessment findings  Will follow along  Skin care plans:  1-Hydraguard to bilateral sacrum, buttock BID and PRN  2-Elevate heels to offload pressure  3-Ehob cushion in chair when out of bed    4-Moisturize skin daily with skin nourishing cream   5-Turn/reposition q2h or when medically stable for pressure re-distribution on skin  6-Allevyn foam to heels, amado w/P, peel foam check skin integrity q-shift  Change q3d  7-Skin tear chest- Cleanse with Normal saline then apply Xeroform and apply allevyn foam amado with a T and date change every other day   8- Left arm- Paint dry eschar with 3M no sting daily  Okay to keep YARELY  9-Wound care will follow weekly-tiger text with questions or concerns  Wound 10/01/21 Skin Tear Arm Left (Active)   Wound Image   10/21/21 0853   Wound Description Dry;Brown;Eschar 10/21/21 0853   Fina-wound Assessment Dry; Intact 10/21/21 0853   Wound Length (cm) 2 cm 10/21/21 0853   Wound Width (cm) 2 5 cm 10/21/21 0853   Wound Depth (cm) 0 cm 10/21/21 0853   Wound Surface Area (cm^2) 5 cm^2 10/21/21 0853   Wound Volume (cm^3) 0 cm^3 10/21/21 0853   Calculated Wound Volume (cm^3) 0 cm^3 10/21/21 0853   Change in Wound Size % 100 10/21/21 0853   Tunneling 0 cm 10/21/21 0853   Undermining 0 10/21/21 0853   Wound Site Closure     Drainage Amount None 10/21/21 0853   Drainage Description     Non-staged Wound Description Not applicable 05/09/14 9027   Treatments Cleansed;Site care 10/21/21 0853   Dressing Other (Comment) 10/21/21 0853   Wound packed? No 10/21/21 0853   Dressing Changed New 10/21/21 0853   Patient Tolerance Tolerated well 10/21/21 0853   Dressing Status         Wound 10/09/21 Skin Tear Sternum (Active)   Wound Image   10/21/21 0852   Wound Description Beefy red 10/21/21 0852   Fina-wound Assessment Dry; Intact; Purple 10/21/21 0852   Wound Length (cm) 1 cm 10/21/21 0852   Wound Width (cm) 1 5 cm 10/21/21 0852   Wound Depth (cm) 0 1 cm 10/21/21 0852   Wound Surface Area (cm^2) 1 5 cm^2 10/21/21 0852   Wound Volume (cm^3) 0 15 cm^3 10/21/21 0852   Calculated Wound Volume (cm^3) 0 15 cm^3 10/21/21 0852   Change in Wound Size % 60 53 10/21/21 0852   Tunneling 0 cm 10/21/21 0852   Undermining 0 10/21/21 2683   Drainage Amount Scant 10/21/21 0852   Drainage Description Serosanguineous 10/21/21 0852   Non-staged Wound Description Partial thickness 10/21/21 0852   Treatments Cleansed;Site care 10/21/21 0852   Dressing Xeroform; Foam, Silicon (eg  Allevyn, etc) 10/21/21 1699   Wound packed? No 10/21/21 0852   Dressing Changed Reinforced 10/21/21 0852   Patient Tolerance Tolerated well 10/21/21 0852   Dressing Status Clean;Dry; Intact 10/21/21 0852       Wound 10/13/21  Other (Comment) Buttocks Right (Active)   Wound Image   10/21/21 0853   Wound Description Dry; Intact; Pink 10/21/21 0853   Pressure Injury Stage     Fina-wound Assessment Dry; Intact; Hyperpigmented;Pink 10/21/21 0853   Wound Length (cm) 0 cm 10/21/21 0853   Wound Width (cm) 0 cm 10/21/21 0853   Wound Depth (cm) 0 cm 10/21/21 0853   Wound Surface Area (cm^2) 0 cm^2 10/21/21 0853   Wound Volume (cm^3) 0 cm^3 10/21/21 0853   Calculated Wound Volume (cm^3) 0 cm^3 10/21/21 0853   Change in Wound Size % 100 10/21/21 0853   Tunneling in depth located at 0 10/21/21 0853   Undermining is depth extending from 0 10/21/21 0853   Drainage Amount None 10/21/21 0853   Non-staged Wound Description Not applicable 07/32/00 2257   Treatments Cleansed;Site care 10/21/21 0853   Dressing Moisture barrier 10/21/21 0853   Wound packed?  No 10/21/21 0853   Dressing Changed New 10/21/21 0853   Patient Tolerance Tolerated well 10/21/21 0853   Dressing Status       Call or tigertext with any questions  Wound Care will continue to follow    Gayla Alas RN BSN  Wound care

## 2021-10-21 NOTE — PROGRESS NOTES
[facility]  Progress Note - Elham Goldberg [birth year], 77 y o  female MRN: [id number]  Unit/Bed#: [facility] 929-01 Encounter: [id number]  Primary Care Provider: Renu Perez MD   Date and time admitted to hospital: 10/9/2021  2:19 PM      * Sepsis on admission  Assessment & Plan  Previously with fever coupled with tachycardia and elevated procalcitonin  Likely secondary to infected left renal hematoma -> Fusobacterium bacteremia noted - fluid culture from 10/13 s/p IR-guided drainage growing Enterobacter/Enterococcus  IV Zosyn regimen per Infectious Disease transitioned to PO Flagyl as patient to be initiated on hemodialysis  Continue to monitor vitals and maintain hemodynamics    Acute renal failure superimposed on CKD stage 5  Assessment & Plan  Progressively worsening renal failure  Son aware of poor/guarded prognosis - appreciate nephrology input -> underwent tunneled catheter placement yesterday w/ subsequent initiation of dialysis  Monitor renal function and urine output - limit/avoid nephrotoxins if possible - avoid hypotension as possible  History of obstructive uropathy secondary to left-sided hydronephrosis s/p stenting noted - recent CT imaging on 10/13 revealed resolution of hydronephrosis -> progressive increase in creatinine still noted, making obstructive etiology less likely  H/o ileal conduit noted as well    Bacteremia  Assessment & Plan  Blood cultures from 10/9 growing Fusobacterium - IV Zosyn transitioned to PO Flagyl per Infectious Disease  Repeat blood cultures 10/12 are negative    Swelling of right upper extremity  Assessment & Plan  Underwent repeat RUE vascular doppler study noting "a narrowing in the subclavian vein at the clavicle created elevated  PSV and turbulent flow  The dialysis AVF appears to be matured with adequate diameter, flow volumes and less than 6mm in depth throughout the arm   Antegrade, high resistant blunted flow noted in the peripheral arteries  No evidence of outflow obstruction  No evidence of a pseudoaneurysm  No evidence of a large venous branch "  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  Supportive care otherwise    [address] gastroenterology input -> underwent endoscopy today without evidence of acute bleeding  Monitor H/H  Okay to resume Eliquis per gastroenterology  C/w PPI regimen - hiatal hernia noted on endoscopy    Essential hypertension  Assessment & Plan  C/w Lopressor (w/ holding parameters for borderline hypotensive states)    Hypothyroidism  Assessment & Plan  C/w Synthroid     History of pulmonary embolism  Assessment & Plan  Okay to resume Eliquis today s/p EGD per gastroenterology (no evidence of acute bleed)    Thrombocytopenia  Assessment & Plan  Monitor platelet count - monitor for bleeding    Anemia of chronic disease  Assessment & Plan  Monitor H/H and transfuse if necessary - s/p previous transfusion on 10/12  In the setting of chronic kidney disease exacerbated by acute melena and renal hematoma (see above)  See plan for melena above      DVT Prophylaxis:  Eliquis resumed      Patient Centered Rounds:  I have performed bedside rounds and discussed plan of care with nursing today  Discussions with Specialists or Other Care Team Provider:  see above assessments if applicable    Education and Discussions with Family / Patient:  Patient at bedside - discussed with son, at bedside, yesterday  Time Spent for Care:  32 minutes  More than 50% of total time spent on counseling and coordination of care as described above  Current Length of Stay: 12 day(s)    Current Patient Status: Inpatient   Certification Statement:  Patient will continue to require additional hospital stay due to assessments as noted above  Code Status: Level 1 - Full Code        Subjective:     Seen/examined earlier today while undergoing hemodialysis    She does acknowledge her weakness/fatigue but denies worsening pain at this time  Objective:     Vitals:   Temp (24hrs), Av 7 °F (35 9 °C), Min:96 °F (35 6 °C), Max:98 2 °F (36 8 °C)    Temp:  [96 °F (35 6 °C)-98 2 °F (36 8 °C)] 98 2 °F (36 8 °C)  HR:  [] 78  Resp:  [15-20] 18  BP: ()/(44-87) 103/53  SpO2:  [94 %-100 %] 100 %  Body mass index is 37 11 kg/m²  Input and Output Summary (last 24 hours):        Intake/Output Summary (Last 24 hours) at 10/21/2021 1559  Last data filed at 10/21/2021 1410  Gross per 24 hour   Intake 820 ml   Output 3500 ml   Net -2680 ml       Physical Exam:     GENERAL:  Obese - weak/fatigued  HEAD:  Normocephalic - atraumatic  EYES: PERRL - EOMI   MOUTH:  Mucosa moist  NECK:  Supple - full range of motion  CARDIAC:  Rate controlled - S1/S2 positive  PULMONARY:  Fairly clear to auscultation - nonlabored respirations  ABDOMEN:  Soft - nontender/nondistended - active bowel sounds  MUSCULOSKELETAL:  Motor strength/range of motion deconditioned  NEUROLOGIC:  Improving lethargy  SKIN:  Chronic wrinkles/blemishes - upper extremity ecchymoses present  PSYCHIATRIC:  Mood/affect flat      Additional Data:     Labs & Recent Cultures:    Results from last 7 days   Lab Units 10/21/21  0932 10/18/21  1504   WBC Thousand/uL 10 98* 9 86   HEMOGLOBIN g/dL 8 2* 8 8*   HEMATOCRIT % 26 8* 28 8*   PLATELETS Thousands/uL 96* 123*   BANDS PCT %  --  6   NEUTROS PCT % 80*  --    LYMPHS PCT % 9*  --    LYMPHO PCT %  --  11*   MONOS PCT % 8  --    MONO PCT %  --  6   EOS PCT % 0 2     Results from last 7 days   Lab Units 10/21/21  0932   POTASSIUM mmol/L 3 6   CHLORIDE mmol/L 104   CO2 mmol/L 21   BUN mg/dL 92*   CREATININE mg/dL 5 29*   CALCIUM mg/dL 7 9*   ALK PHOS U/L 208*   ALT U/L <6*   AST U/L 50*     Results from last 7 days   Lab Units 10/18/21  1504   INR  1 63*             Results from last 7 days   Lab Units 10/16/21  0419   LACTIC ACID mmol/L 1 1                 Last 24 Hours Medication List:   Current Facility-Administered Medications   Medication Dose Route Frequency Provider Last Rate    acetaminophen  650 mg Oral Q6H PRN John Mejia MD      aluminum-magnesium hydroxide-simethicone  30 mL Oral Q6H PRN John Mejia MD      apixaban  2 5 mg Oral BID Lilian Thomason MD      atorvastatin  40 mg Oral HS John Mejia MD      calcitriol  0 25 mcg Oral Daily John Mejia MD      cholecalciferol  800 Units Oral Daily John Mejia MD      citalopram  20 mg Oral Daily John Mejia MD      docusate sodium  100 mg Oral BID John Mejia MD      levothyroxine  75 mcg Oral Daily John Mejia MD      melatonin  3 mg Oral HS John Mejia MD      metoprolol  2 5 mg Intravenous Q6H PRN Ilda Arambula PA-C      metoprolol tartrate  25 mg Oral BID Ilda Arambula PA-C      metroNIDAZOLE  500 mg Oral Q8H [address] Jessica Khan MD      ondansetron  4 mg Intravenous Q6H PRN John Mejia MD      pantoprazole  40 mg Intravenous Q12H [address] DO Rosa Maria Camilo boulardii  250 mg Oral Daily John Mejia MD      senna  1 tablet Oral Daily John Mejia MD                    ** Please Note: This note is constructed using a voice recognition dictation system  An occasional wrong word/phrase or sound-a-like substitution may have been picked up by dictation device due to the inherent limitations of voice recognition software  Read the chart carefully and recognize, using reasonable context, where substitutions may have occurred  **

## 2021-10-22 ENCOUNTER — APPOINTMENT (INPATIENT)
Dept: RADIOLOGY | Facility: HOSPITAL | Age: 76
DRG: 981 | End: 2021-10-22
Payer: COMMERCIAL

## 2021-10-22 LAB
ALBUMIN SERPL BCP-MCNC: 1.5 G/DL (ref 3.5–5)
ALP SERPL-CCNC: 206 U/L (ref 46–116)
ALT SERPL W P-5'-P-CCNC: <6 U/L (ref 12–78)
ANION GAP SERPL CALCULATED.3IONS-SCNC: 9 MMOL/L (ref 4–13)
AST SERPL W P-5'-P-CCNC: 51 U/L (ref 5–45)
BASOPHILS # BLD AUTO: 0.03 THOUSANDS/ΜL (ref 0–0.1)
BASOPHILS NFR BLD AUTO: 0 % (ref 0–1)
BILIRUB SERPL-MCNC: 1.08 MG/DL (ref 0.2–1)
BUN SERPL-MCNC: 55 MG/DL (ref 5–25)
CALCIUM ALBUM COR SERPL-MCNC: 10.2 MG/DL (ref 8.3–10.1)
CALCIUM SERPL-MCNC: 8.2 MG/DL (ref 8.3–10.1)
CHLORIDE SERPL-SCNC: 100 MMOL/L (ref 100–108)
CO2 SERPL-SCNC: 25 MMOL/L (ref 21–32)
CREAT SERPL-MCNC: 3.86 MG/DL (ref 0.6–1.3)
EOSINOPHIL # BLD AUTO: 0.02 THOUSAND/ΜL (ref 0–0.61)
EOSINOPHIL NFR BLD AUTO: 0 % (ref 0–6)
ERYTHROCYTE [DISTWIDTH] IN BLOOD BY AUTOMATED COUNT: 19.5 % (ref 11.6–15.1)
GFR SERPL CREATININE-BSD FRML MDRD: 11 ML/MIN/1.73SQ M
GLUCOSE SERPL-MCNC: 85 MG/DL (ref 65–140)
HCT VFR BLD AUTO: 24.1 % (ref 34.8–46.1)
HGB BLD-MCNC: 7.5 G/DL (ref 11.5–15.4)
IMM GRANULOCYTES # BLD AUTO: 0.15 THOUSAND/UL (ref 0–0.2)
IMM GRANULOCYTES NFR BLD AUTO: 2 % (ref 0–2)
LYMPHOCYTES # BLD AUTO: 0.7 THOUSANDS/ΜL (ref 0.6–4.47)
LYMPHOCYTES NFR BLD AUTO: 7 % (ref 14–44)
MCH RBC QN AUTO: 29.1 PG (ref 26.8–34.3)
MCHC RBC AUTO-ENTMCNC: 31.1 G/DL (ref 31.4–37.4)
MCV RBC AUTO: 93 FL (ref 82–98)
MONOCYTES # BLD AUTO: 0.84 THOUSAND/ΜL (ref 0.17–1.22)
MONOCYTES NFR BLD AUTO: 8 % (ref 4–12)
NEUTROPHILS # BLD AUTO: 8.37 THOUSANDS/ΜL (ref 1.85–7.62)
NEUTS SEG NFR BLD AUTO: 83 % (ref 43–75)
NRBC BLD AUTO-RTO: 1 /100 WBCS
PLATELET # BLD AUTO: 79 THOUSANDS/UL (ref 149–390)
PMV BLD AUTO: 12.4 FL (ref 8.9–12.7)
POTASSIUM SERPL-SCNC: 3.4 MMOL/L (ref 3.5–5.3)
PROCALCITONIN SERPL-MCNC: 36.08 NG/ML
PROT SERPL-MCNC: 5.3 G/DL (ref 6.4–8.2)
RBC # BLD AUTO: 2.58 MILLION/UL (ref 3.81–5.12)
SODIUM SERPL-SCNC: 134 MMOL/L (ref 136–145)
WBC # BLD AUTO: 10.11 THOUSAND/UL (ref 4.31–10.16)

## 2021-10-22 PROCEDURE — 99232 SBSQ HOSP IP/OBS MODERATE 35: CPT | Performed by: INTERNAL MEDICINE

## 2021-10-22 PROCEDURE — 84145 PROCALCITONIN (PCT): CPT | Performed by: INTERNAL MEDICINE

## 2021-10-22 PROCEDURE — 76705 ECHO EXAM OF ABDOMEN: CPT

## 2021-10-22 PROCEDURE — 85025 COMPLETE CBC W/AUTO DIFF WBC: CPT | Performed by: INTERNAL MEDICINE

## 2021-10-22 PROCEDURE — 80053 COMPREHEN METABOLIC PANEL: CPT | Performed by: INTERNAL MEDICINE

## 2021-10-22 RX ORDER — PANTOPRAZOLE SODIUM 40 MG/1
40 TABLET, DELAYED RELEASE ORAL
Status: DISCONTINUED | OUTPATIENT
Start: 2021-10-22 | End: 2021-11-13 | Stop reason: HOSPADM

## 2021-10-22 RX ADMIN — LEVOTHYROXINE SODIUM 75 MCG: 75 TABLET ORAL at 09:10

## 2021-10-22 RX ADMIN — PANTOPRAZOLE SODIUM 40 MG: 40 TABLET, DELAYED RELEASE ORAL at 09:10

## 2021-10-22 RX ADMIN — CITALOPRAM HYDROBROMIDE 20 MG: 20 TABLET ORAL at 09:09

## 2021-10-22 RX ADMIN — ATORVASTATIN CALCIUM 40 MG: 40 TABLET, FILM COATED ORAL at 22:56

## 2021-10-22 RX ADMIN — PANTOPRAZOLE SODIUM 40 MG: 40 TABLET, DELAYED RELEASE ORAL at 15:10

## 2021-10-22 RX ADMIN — METRONIDAZOLE 500 MG: 500 TABLET ORAL at 06:21

## 2021-10-22 RX ADMIN — METOPROLOL TARTRATE 25 MG: 25 TABLET, FILM COATED ORAL at 18:27

## 2021-10-22 RX ADMIN — METOPROLOL TARTRATE 25 MG: 25 TABLET, FILM COATED ORAL at 09:09

## 2021-10-22 RX ADMIN — METRONIDAZOLE 500 MG: 500 TABLET ORAL at 15:10

## 2021-10-22 RX ADMIN — CALCITRIOL 0.25 MCG: 0.25 CAPSULE, LIQUID FILLED ORAL at 09:13

## 2021-10-22 RX ADMIN — APIXABAN 2.5 MG: 2.5 TABLET, FILM COATED ORAL at 09:17

## 2021-10-22 RX ADMIN — MELATONIN TAB 3 MG 3 MG: 3 TAB at 22:56

## 2021-10-22 RX ADMIN — METRONIDAZOLE 500 MG: 500 TABLET ORAL at 22:56

## 2021-10-22 RX ADMIN — Medication 250 MG: at 09:09

## 2021-10-22 RX ADMIN — APIXABAN 2.5 MG: 2.5 TABLET, FILM COATED ORAL at 18:26

## 2021-10-22 NOTE — CASE MANAGEMENT
Pt's d/c plan was discussed during care coordination rounds  Pt is being recommended to transition to STR at d/c, however, family would like to take pt home with VNA services  Concerns were expressed about family's ability to care for pt at home without pt first having STR  MD to call and discuss d/c plan with pt's family

## 2021-10-22 NOTE — PLAN OF CARE
Problem: MOBILITY - ADULT  Goal: Maintain or return to baseline ADL function  Description: INTERVENTIONS:  -  Assess patient's ability to carry out ADLs; assess patient's baseline for ADL function and identify physical deficits which impact ability to perform ADLs (bathing, care of mouth/teeth, toileting, grooming, dressing, etc )  - Assess/evaluate cause of self-care deficits   - Assess range of motion  - Assess patient's mobility; develop plan if impaired  - Assess patient's need for assistive devices and provide as appropriate  - Encourage maximum independence but intervene and supervise when necessary  - Involve family in performance of ADLs  - Assess for home care needs following discharge   - Consider OT consult to assist with ADL evaluation and planning for discharge  - Provide patient education as appropriate  Outcome: Progressing  Goal: Maintains/Returns to pre admission functional level  Description: INTERVENTIONS:  - Perform BMAT or MOVE assessment daily    - Set and communicate daily mobility goal to care team and patient/family/caregiver  - Collaborate with rehabilitation services on mobility goals if consulted  - Reposition patient every 2 hours    - Out of bed for toileting  - Record patient progress and toleration of activity level   Outcome: Progressing     Problem: Potential for Falls  Goal: Patient will remain free of falls  Description: INTERVENTIONS:  - Educate patient/family on patient safety including physical limitations  - Instruct patient to call for assistance with activity   - Consult OT/PT to assist with strengthening/mobility   - Keep Call bell within reach  - Keep bed low and locked with side rails adjusted as appropriate  - Keep care items and personal belongings within reach  - Initiate and maintain comfort rounds  - Make Fall Risk Sign visible to staff  - Offer Toileting every 2 Hours, in advance of need  - Initiate/Maintain alarm  - Obtain necessary fall risk management equipment  - Apply yellow socks and bracelet for high fall risk patients  - Consider moving patient to room near nurses station  Outcome: Progressing     Problem: Prexisting or High Potential for Compromised Skin Integrity  Goal: Skin integrity is maintained or improved  Description: INTERVENTIONS:  - Identify patients at risk for skin breakdown  - Assess and monitor skin integrity  - Assess and monitor nutrition and hydration status  - Monitor labs   - Assess for incontinence   - Turn and reposition patient  - Assist with mobility/ambulation  - Relieve pressure over bony prominences  - Avoid friction and shearing  - Provide appropriate hygiene as needed including keeping skin clean and dry  - Evaluate need for skin moisturizer/barrier cream  - Collaborate with interdisciplinary team   - Patient/family teaching  - Consider wound care consult   Outcome: Progressing     Problem: Nutrition/Hydration-ADULT  Goal: Nutrient/Hydration intake appropriate for improving, restoring or maintaining nutritional needs  Description: Monitor and assess patient's nutrition/hydration status for malnutrition  Collaborate with interdisciplinary team and initiate plan and interventions as ordered  Monitor patient's weight and dietary intake as ordered or per policy  Utilize nutrition screening tool and intervene as necessary  Determine patient's food preferences and provide high-protein, high-caloric foods as appropriate       INTERVENTIONS:  - Monitor oral intake, urinary output, labs, and treatment plans  - Assess nutrition and hydration status and recommend course of action  - Evaluate amount of meals eaten  - Assist patient with eating if necessary   - Allow adequate time for meals  - Recommend/ encourage appropriate diets, oral nutritional supplements, and vitamin/mineral supplements  - Order, calculate, and assess calorie counts as needed  - Recommend, monitor, and adjust tube feedings and TPN/PPN based on assessed needs  - Assess need for intravenous fluids  - Provide specific nutrition/hydration education as appropriate  - Include patient/family/caregiver in decisions related to nutrition  Outcome: Progressing     Problem: METABOLIC, FLUID AND ELECTROLYTES - ADULT  Goal: Electrolytes maintained within normal limits  Description: INTERVENTIONS:  - Monitor labs and assess patient for signs and symptoms of electrolyte imbalances  - Administer electrolyte replacement as ordered  - Monitor response to electrolyte replacements, including repeat lab results as appropriate  - Instruct patient on fluid and nutrition as appropriate  Outcome: Progressing  Goal: Fluid balance maintained  Description: INTERVENTIONS:  - Monitor labs   - Monitor I/O and WT  - Instruct patient on fluid and nutrition as appropriate  - Assess for signs & symptoms of volume excess or deficit  Outcome: Progressing

## 2021-10-22 NOTE — PROGRESS NOTES
[facility]  Progress Note - Omari Campos [birth year], 77 y o  female MRN: [id number]  Unit/Bed#: [facility] 929-01 Encounter: [id number]  Primary Care Provider: Hetal Gore MD   Date and time admitted to hospital: 10/9/2021  2:19 PM    * Sepsis on admission  Assessment & Plan  Previously with fever coupled with tachycardia and elevated procalcitonin  Likely secondary to infected left renal hematoma -> Fusobacterium bacteremia noted - fluid culture from 10/13 s/p IR-guided drainage growing Enterobacter/Enterococcus  P o  Flagyl per Infectious Disease  Monitor closely    Bacteremia  Assessment & Plan  Blood cultures from 10/9 growing Fusobacterium - IV Zosyn transitioned to PO Flagyl per Infectious Disease  Repeat blood cultures 10/12 are negative    Hypothyroidism  Assessment & Plan  C/w Synthroid     Melena  Assessment & Plan  underwent endoscopy today without evidence of acute bleeding  Monitor hemoglobin    Acute renal failure superimposed on CKD stage 5  Assessment & Plan  Progressively worsening renal failure  Dialysis per nephrology  Nephrology input noted    Anemia of chronic disease  Assessment & Plan  Monitor hemoglobin    Essential hypertension  Assessment & Plan  Monitor blood pressures  Avoid hypotension    History of pulmonary embolism  Assessment & Plan   s/p EGD per gastroenterology   Eliquis for anticoagulation            VTE Pharmacologic Prophylaxis: VTE Score: 13 High Risk (Score >/= 5) - Pharmacological DVT Prophylaxis Ordered: apixaban (Eliquis)  Sequential Compression Devices Ordered  Patient Centered Rounds: I performed bedside rounds with nursing staff today  Discussions with Specialists or Other Care Team Provider:  Infectious Disease    Education and Discussions with Family / Patient: patient, updated son Eliceo Hugo in detail   Time Spent for Care: 30 minutes   More than 50% of total time spent on counseling and coordination of care as described above     Current Length of Stay: 13 day(s)  Current Patient Status: Inpatient   Certification Statement: The patient will continue to require additional inpatient hospital stay due to as outlined  Discharge Plan: awaiting clinical and symptomatic improvement    Code Status: Level 1 - Full Code    Subjective:     Comfortably in bed  Reports feeling tired and fatigued  Appetite fair  History chart labs medications reviewed    Objective:     Vitals:   Temp (24hrs), Av 7 °F (36 5 °C), Min:97 6 °F (36 4 °C), Max:97 7 °F (36 5 °C)    Temp:  [97 6 °F (36 4 °C)-97 7 °F (36 5 °C)] 97 6 °F (36 4 °C)  HR:  [77-79] 77  Resp:  [16] 16  BP: (111-113)/(63) 113/63  SpO2:  [92 %] 92 %  Body mass index is 37 11 kg/m²  Input and Output Summary (last 24 hours): Intake/Output Summary (Last 24 hours) at 10/22/2021 1654  Last data filed at 10/22/2021 1300  Gross per 24 hour   Intake 120 ml   Output 100 ml   Net 20 ml       Physical Exam:   Physical Exam     Comfortably in bed  Features of protein calorie malnutrition noted  Neck supple  Lungs diminished breath sounds bilateral  Heart sounds S1-S2 noted  Abdomen soft  Awake obeys simple commands  Pulses noted  No rash    Additional Data:     Labs:  Results from last 7 days   Lab Units 10/22/21  0717 10/19/21  0600 10/18/21  1504   WBC Thousand/uL 10 11  --  9 86   HEMOGLOBIN g/dL 7 5*  --  8 8*   HEMATOCRIT % 24 1*  --  28 8*   PLATELETS Thousands/uL 79*  --  123*   BANDS PCT %  --   --  6   NEUTROS PCT % 83*   < >  --    LYMPHS PCT % 7*   < >  --    LYMPHO PCT %  --   --  11*   MONOS PCT % 8   < >  --    MONO PCT %  --   --  6   EOS PCT % 0   < > 2    < > = values in this interval not displayed       Results from last 7 days   Lab Units 10/22/21  0718   SODIUM mmol/L 134*   POTASSIUM mmol/L 3 4*   CHLORIDE mmol/L 100   CO2 mmol/L 25   BUN mg/dL 55*   CREATININE mg/dL 3 86*   ANION GAP mmol/L 9   CALCIUM mg/dL 8 2*   ALBUMIN g/dL 1 5*   TOTAL BILIRUBIN mg/dL 1 08*   ALK PHOS U/L 206*   ALT U/L <6*   AST U/L 51*   GLUCOSE RANDOM mg/dL 85     Results from last 7 days   Lab Units 10/18/21  1504   INR  1 63*             Results from last 7 days   Lab Units 10/22/21  0718 10/16/21  0419   LACTIC ACID mmol/L  --  1 1   PROCALCITONIN ng/ml 36 08*  --        Lines/Drains:  Invasive Devices     Peripherally Inserted Central Catheter Line            PICC Line 10/11/21 10 days          Central Venous Catheter Line            CVC Central Lines 10/09/21 Triple 20cm 12 days          Line            Hemodialysis AV Fistula 09/30/21 Right Upper arm 22 days          Hemodialysis Catheter            HD Permanent Double Catheter 2 days          Drain            Urostomy Ileal conduit RUQ 1843 days    Closed/Suction Drain Left Other (Comment) 8 days    Closed/Suction Drain Left Other (Comment) 10 Fr  8 days                Central Line:  Goal for removal: Dialysis catheter             Imaging: Reviewed radiology reports from this admission including: Imaging studies noted    Recent Cultures (last 7 days):         Last 24 Hours Medication List:   Current Facility-Administered Medications   Medication Dose Route Frequency Provider Last Rate    acetaminophen  650 mg Oral Q6H PRN Elizabeth Kwok MD      aluminum-magnesium hydroxide-simethicone  30 mL Oral Q6H PRN Elizabeth Kwok MD      apixaban  2 5 mg Oral BID Parish Garcia MD      atorvastatin  40 mg Oral HS Elizabeht Kwok MD      calcitriol  0 25 mcg Oral Daily Elizabeth Kwok MD      cholecalciferol  800 Units Oral Daily Elizabeth Kwok MD      citalopram  20 mg Oral Daily Elizabeth Kwok MD      docusate sodium  100 mg Oral BID Elizabeth Kwok MD      levothyroxine  75 mcg Oral Daily Elizabeth Kwok MD      melatonin  3 mg Oral HS Elizabeth Kwok MD      metoprolol  2 5 mg Intravenous Q6H PRN Janine Ritchie PA-C      metoprolol tartrate  25 mg Oral BID Janine Ritchie PA-C      metroNIDAZOLE  500 mg Oral [facility] [address] Natalie Zelaya MD      ondansetron  4 mg Intravenous Q6H PRN Kvng Yin MD      pantoprazole  40 mg Oral BID AC Mumtaz Laguerre DO      saccharomyces boulardii  250 mg Oral Daily Kvng Yin MD      senna  1 tablet Oral Daily Kvng Yin MD          Today, Patient Was Seen By: Evelyn Ng MD    **Please Note: This note may have been constructed using a voice recognition system  **

## 2021-10-22 NOTE — PROGRESS NOTES
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)  Urine cultures positive for Klebsiella and Enterobacter  CT C/A/P with evidence of loculated fluid collection within the pelvic cul-de-sac and new collection in the left paracolic gutter  S/P IR drainage and culture collection x2 on 10/13 with both the perinephric and paracolic drainage sites growing enterobacter aerogenes and enterococcus avium susceptible to Zosyn  Throughout the weekend and still today, the patient has continued to have progressive decline with worsening fatigue and weakness  Likely multifactorial in nature especially in the presence of problem 5  Given decline and significant comorbidities, would strongly advise goals of care discussion               Discontinue Zosyn and transition to Flagyl 500 mg q8 hours until 10/25              Monitor WBC and trend fever curve              Repeat labs tomorrow - CBC w/ diff and BMP              Ongoing urology and nephrology follow up               Goals of care discussion with patient and family               Monitor abdominal/flank exam with low threshold for reimaging               Supportive care per primary team      2   Fusobacterium bacteremia: Initial blood cultures positive 2/2  Rare cause of bacterium and atypical isolate and contaminate  Most typically due to urinary track or oral infection  UCx with no growth of this organism and patient with no teeth or signs of oral abscess  Repeat BCx with negative growth   CT A/P on 10/14 identifying new fluid collection and patient now s/p drainage from both the paracolic and perinephric fluid collections  Cultures showing no growth of this organism  Source remains unknown  Possibly transient due to problem 1   However given patient's recent episodes of melena, possibility for a GI tract source still remains              Antibiotics as above              Transition to flagyl to complete a 2 week course of anaerobic coverage until 10/25              Monitor WBC and trend fever curve              Follow up pending cultures              Supportive care per primary team      3  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: Patient presenting with worsening flank and abdominal pain  CT abdomen/pelvis demonstrating hematoma in the left renal fosa with extension of the hemorrhage/thickening suggesting possible hemoperitoneum  Patient recently underwent PCN and is likely cause of bleed, especially in setting of chronic AC  S/P IR drainage on 10/14 with cultures growing enterobacter and enterococcus   S/P course of Zosyn               Antibiotics as above              Ongoing follow up by IR, urology, and general surgery               Monitor abdominal/flank exam              Supportive care per primary team      4  Small left pleural effusion: Noted to have small left pleural effusion  S/P OR thoracentesis on 10/11 with removal of 90 cc of nikos fluid  Cultures negative                Antibiotics as above              Monitor WBC and trend fever curve              Monitor O2 saturations              Supportive care per primary team      5  Chronic kidney disease with fistula: History of CKD V nearing hemodialysis  Worsening creatinine parallels declining mental status  Fistula present in the RUE with evidence of worsening swelling  Duplex US completed earlier in admission noted 2/2 vein stenosis - vascular surgery and IR following  Oliguric and increase lethargy with concern for uremia, possibly 2/2 ATN from zosyn   Nephrology consulted with plans to initiate HD today                No need to renally adjust antibiotics              Monitor urine output              Repeat morning labs              Ongoing follow up by nephrology and vascular surgery               HD per nephrology team               Avoid upper extremity PICC line     6  Transaminitis: Patient noted to have mild transaminitis which has since improved  Occurring the setting of continued diarrhea  No liver or bilary treat involvement noted on recent CT  LFTs improved at this time                Monitor LFTs              Supportive care per primary team      7  History of C  Difficile collitis in the setting of continued diarrhea  History of C Diff in 2019  C  Diff studies this admission negative                Antibiotics as above              Monitor stool output              No indication for C diff prophylaxis     8  Melena: Noted to have multiple episodes of black, tarry odorous stool  FOBT positive and hemoglobin labile  Off all anticoagulation this admission  Given no source of problem 2, concern for possible GI tract source given new onset melena  EGD w/ large hiatal hernia  No active bleeding              Monitor stool output               Monitor and trend H&H; transfusions per primary team               Gastroenterology following; appreciate input     9  History of PE: History of chronic saddle PE  On Eliquis 2 5 mg BID at home                    Anticoagulation per primary team      10  Polycythemia vera and MDS: Follows with hematology/oncology as an outpatient and was on [address] follow up with hematology oncology               Supportive care per primary team      Antibiotics:     Started 10/9     D 13 total  Continue Flagyl until 10/25    Subjective:  Patient endorses being tired  Denies any pain  Has no fever, chills, sweats; no nausea, vomiting, diarrhea; no cough, shortness of breath; no pain  No new symptoms      Objective:  Vitals:  Temp:  [96 2 °F (35 7 °C)-98 2 °F (36 8 °C)] 97 7 °F (36 5 °C)  HR:  [] 79  Resp:  [18-20] 18  BP: ()/(44-63) 111/63  SpO2:  [90 %-100 %] 92 %  Temp (24hrs), Av 2 °F (36 2 °C), Min:96 2 °F (35 7 °C), Max:98 2 °F (36 8 °C)  Current: Temperature: 97 7 °F (36 5 °C)    Physical Exam:   General Appearance:  Alert, interactive, nontoxic, no acute distress  Throat: Oropharynx moist without lesions  Lungs:   Clear to auscultation bilaterally; no wheezes, rhonchi or rales; respirations unlabored   Heart:  RRR; no murmur, rub or gallop   Abdomen:   Soft, non-tender, non-distended, positive bowel sounds  Extremities: No clubbing, cyanosis or edema   Skin: No new rashes or lesions  No draining wounds noted         Labs, Imaging, & Other studies:   All pertinent labs and imaging studies were personally reviewed  Results from last 7 days   Lab Units 10/22/21  0717 10/21/21  0932 10/20/21  0600   WBC Thousand/uL 10 11 10 98* 11 25*   HEMOGLOBIN g/dL 7 5* 8 2* 8 5*   PLATELETS Thousands/uL 79* 96* 127*     Results from last 7 days   Lab Units 10/22/21  0718 10/21/21  0932 10/20/21  0600   SODIUM mmol/L 134* 137 138   POTASSIUM mmol/L 3 4* 3 6 4 2   CHLORIDE mmol/L 100 104 103   CO2 mmol/L 25 21 20*   BUN mg/dL 55* 92* 128*   CREATININE mg/dL 3 86* 5 29* 6 78*   EGFR ml/min/1 73sq m 11 7 5   CALCIUM mg/dL 8 2* 7 9* 8 3   AST U/L 51* 50* 39   ALT U/L <6* <6* <6*   ALK PHOS U/L 206* 208* 210*         Results from last 7 days   Lab Units 10/22/21  0718   PROCALCITONIN ng/ml 36 08*

## 2021-10-22 NOTE — PROGRESS NOTES
The pantoprazole has / have been converted to Oral per [facility] IV-to-PO Auto-Conversion Protocol for Adults as approved by the Pharmacy and Therapeutics Committee  The patient met all eligible criteria:  3 Age = 25years old   2) Received at least one dose of the IV form   3) Receiving at least one other scheduled oral/enteral medication   4) Tolerating an oral/enteral diet   and did not have any exclusions:   1) Critical care patient   2) Active GI bleed (IF assessing H2RAs or PPIs)   3) Continuous tube feeding (IF assessing cipro, doxycycline, levofloxacin, minocycline, rifampin, or voriconazole)   4) Receiving PO vancomycin (IF assessing metronidazole)   5) Persistent nausea and/or vomiting   6) Ileus or gastrointestinal obstruction   7) Miesha/nasogastric tube set for continuous suction   8) Specific order not to automatically convert to PO (in the order's comments or if discussed in the most recent Infectious Disease or primary team's progress notes)     Thanks  Brea Aiken, Pharmacist

## 2021-10-22 NOTE — PROGRESS NOTES
NEPHROLOGY PROGRESS NOTE    Ralph Drake 77 y o  female MRN: [id number]  Unit/Bed#: [facility] 929-01 Encounter: [id number]  Reason for Consult:  Acute on chronic kidney disease    The patient was lying in bed resting when I went into the room I awakened her she was able answer questions appropriately  Clinically she really does not look any different but I asked her if she felt a little better after couple dialysis treatments and she said she felt a little bit better although nothing very noticeable  ASSESSMENT/PLAN:  1  Renal    The patient developed acute on chronic kidney disease likely from ATN related to the comorbidities that occurred during the hospitalization  She has chronic obstructive uropathy had sepsis with bacteremia  Also developed hematoma in intra abdominal collections requiring drainage  As result she became oliguric creatinine went up she developed some uremic symptoms was initiated on hemodialysis  She has had 2 dialysis treatments  Will plan on dialysis tomorrow  She does have history of pulmonary embolism so no Epogen for now  The patient does have a PermCath and Case management working on outpatient dialysis arrangements prior to discharge  Hemodialysis for now Tuesday Thursday Saturday    2  Infectious Disease    Had treatment with 14 days antibiotics for bacteremia  Also infected left renal hematoma  At this point just continue to monitor  3  GI bleed    Patient underwent GI bleed a few days ago underwent endoscopy GI saw no evidence of acute bleeding  Primary service is aware  4  History of pulmonary embolism  On anticoagulation therapy  5  Hematology    Patient has developed platelets that are low during hospitalization with no apparent etiology  Primary service is aware and their note and is following  6  PT/OT in progress  SUBJECTIVE:  Review of Systems   Constitutional: Positive for malaise/fatigue   Negative for chills, diaphoresis, fever and night sweats  HENT: Negative  Eyes: Negative  Cardiovascular: Positive for leg swelling  Negative for chest pain, dyspnea on exertion, orthopnea and palpitations  Not doing much activity  Respiratory: Negative  Negative for cough, shortness of breath, sputum production and wheezing  Gastrointestinal: Negative for abdominal pain, diarrhea, nausea and vomiting  Genitourinary:        Urostomy present  Neurological: Positive for weakness  Negative for dizziness, focal weakness, headaches and light-headedness  Psychiatric/Behavioral: Negative for altered mental status, hallucinations and hypervigilance  The patient is not nervous/anxious  OBJECTIVE:  Current Weight: Weight - Scale: 86 2 kg (190 lb)  Vitals:Temp (24hrs), Av 2 °F (36 2 °C), Min:96 2 °F (35 7 °C), Max:98 2 °F (36 8 °C)  Current: Temperature: 97 7 °F (36 5 °C)   Blood pressure 111/63, pulse 79, temperature 97 7 °F (36 5 °C), resp  rate 18, height 5' (1 524 m), weight 86 2 kg (190 lb), SpO2 92 %, not currently breastfeeding  , Body mass index is 37 11 kg/m²  Intake/Output Summary (Last 24 hours) at 10/22/2021 0908  Last data filed at 10/22/2021 0201  Gross per 24 hour   Intake 340 ml   Output 2325 ml   Net -1985 ml       Physical Exam: /63   Pulse 79   Temp 97 7 °F (36 5 °C)   Resp 18   Ht 5' (1 524 m)   Wt 86 2 kg (190 lb)   SpO2 92%   BMI 37 11 kg/m²   Physical Exam  Constitutional:       General: She is not in acute distress  Appearance: She is not toxic-appearing or diaphoretic  HENT:      Head: Normocephalic and atraumatic  Mouth/Throat:      Mouth: Mucous membranes are dry  Eyes:      General: No scleral icterus  Extraocular Movements: Extraocular movements intact  Cardiovascular:      Rate and Rhythm: Normal rate and regular rhythm  Heart sounds: No friction rub  No gallop         Comments: Positive edema  Pulmonary:      Effort: Pulmonary effort is normal  No respiratory distress  Breath sounds: Normal breath sounds  No wheezing, rhonchi or rales  Comments: Decreased breath sounds bases  Abdominal:      General: Bowel sounds are normal  There is no distension  Palpations: Abdomen is soft  Tenderness: There is no abdominal tenderness  There is no rebound  Musculoskeletal:      Cervical back: Normal range of motion and neck supple  Neurological:      Mental Status: She is alert and oriented to person, place, and time  Comments: Generalized weakness lying in bed           Medications:    Current Facility-Administered Medications:     acetaminophen (TYLENOL) tablet 650 mg, 650 mg, Oral, Q6H PRN, Carlos Jefferson MD, 650 mg at 10/21/21 1215    aluminum-magnesium hydroxide-simethicone (MYLANTA) oral suspension 30 mL, 30 mL, Oral, Q6H PRN, Carlos Jefferson MD    apixaban (ELIQUIS) tablet 2 5 mg, 2 5 mg, Oral, BID, Yu Rios MD, 2 5 mg at 10/21/21 2306    atorvastatin (LIPITOR) tablet 40 mg, 40 mg, Oral, HS, Carlos Jefferson MD, 40 mg at 10/21/21 2337    calcitriol (ROCALTROL) capsule 0 25 mcg, 0 25 mcg, Oral, Daily, Carlos Jefferson MD, 0 25 mcg at 10/21/21 0807    cholecalciferol (VITAMIN D) oral liquid 800 Units, 800 Units, Oral, Daily, Carlos Jefferson MD, 800 Units at 10/21/21 0808    citalopram (CeleXA) tablet 20 mg, 20 mg, Oral, Daily, Carlos Jefferson MD, 20 mg at 10/21/21 0807    docusate sodium (COLACE) capsule 100 mg, 100 mg, Oral, BID, Carlos Jefferson MD, 100 mg at 10/17/21 1140    levothyroxine tablet 75 mcg, 75 mcg, Oral, Daily, Carlos Jefferson MD, 75 mcg at 10/21/21 0807    melatonin tablet 3 mg, 3 mg, Oral, HS, Carlos Jefferson MD, 3 mg at 10/21/21 2337    metoprolol (LOPRESSOR) injection 2 5 mg, 2 5 mg, Intravenous, Q6H PRN, Loida Gaming PA-C, 2 5 mg at 10/20/21 1618    metoprolol tartrate (LOPRESSOR) tablet 25 mg, 25 mg, Oral, BID, Loida Gaming PA-C, 25 mg at 10/21/21 1730    metroNIDAZOLE (FLAGYL) tablet 500 mg, 500 mg, Oral, Q8H [address], Ju Moore MD, 500 mg at 10/22/21 0621    ondansetron [facility]) injection 4 mg, 4 mg, Intravenous, Q6H PRN, Michael Ruiz MD, 4 mg at 10/14/21 2153    pantoprazole (PROTONIX) EC tablet 40 mg, 40 mg, Oral, BID AC, Mumtaz Laguerre, DO    saccharomyces boulardii (FLORASTOR) capsule 250 mg, 250 mg, Oral, Daily, Michael Ruiz MD, 250 mg at 10/21/21 0807    senna (SENOKOT) tablet 8 6 mg, 1 tablet, Oral, Daily, Michael Ruiz MD, 8 6 mg at 10/17/21 1140    Laboratory Results:  Lab Results   Component Value Date    WBC 10 11 10/22/2021    HGB 7 5 (L) 10/22/2021    HCT 24 1 (L) 10/22/2021    MCV 93 10/22/2021    PLT 79 (L) 10/22/2021     Lab Results   Component Value Date    SODIUM 137 10/21/2021    K 3 6 10/21/2021     10/21/2021    CO2 21 10/21/2021    BUN 92 (H) 10/21/2021    CREATININE 5 29 (H) 10/21/2021    GLUC 87 10/21/2021    CALCIUM 7 9 (L) 10/21/2021     Lab Results   Component Value Date    CALCIUM 7 9 (L) 10/21/2021    PHOS 7 1 (H) 10/18/2021     No results found for: LABPROT

## 2021-10-22 NOTE — UTILIZATION REVIEW
Continued Stay Review    Date: 10/22                          Current Patient Class: IP Current Level of Care:  MS    HPI:76 y o  female with extensive urinary hx, hx PEinitially admitted on 10/9/21 with abdominal pain and concern for hematoma  IR performed drainage of left flank collection/retroperitoneal hematoma sepsis  and left perinephric collection with drain placement on 10/13  Perinephric and paracolic drainage sites growing enterobacter aerogenes and enterococcus avium -pt on IV Zosyn  Pt with ARF per nephrology  Creat increasing  Pt receiving IVF with bicarb  Consideration for dialysis if remains oliguric  Pt confused  10/18 GI consulted for melena-Fecal occult blood testing was  positive  Recommend endoscopic evaluation once her clinical status improved due to melena and requiring long-term anticoagulation  Monitor H/H, currently Eliquis on hold  10/19 -swollen RUE-Plan for repeat RUE vascular doppler study today - previous study 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  Plan for IR guided fistulogram once more medically stable  Pt alert, reports not feeling well, has pain all over and is frustrated  Pt reports being agreeable to dialysis, son in [facility]  10/20 Pt had dialysis cath placed today by IR, with dialysis started today  Pt for EGD 10/21 by GI -R/O PUD  Hgb stable, no overt bleeding noted  Pt remains on PPI  Plan is to transition to Flagyl po 10/21 from IV Zosyn  , complete 14 days of therapy thru 10/25, trend fever curve, WBC's  Possible plan for IR guided fistulogram once more medically stable  10/21 EGD -Large sliding hiatal hernia (type I hiatal hernia) - GE junction 35 cm from the incisors, diaphragmatic impression 40 cm from the incisors  The esophagus, stomach and duodenum appeared normal No clear cause for bleeding identified  Per GI, can restart AC, Eliquis, and diet  Pt continues on PPI therapy  Pt having dialysis again today   Pt hasn't felt any improvement in her status yet  Assessment/Plan: 10/22/21  Pt answering questions appropriately per nephrology  Pt reports feeling a little better after receiving 2 dialysis treatment but nothing very noticeable  Pt continues to be fatigued, ill appearing  Pt has decreased edema RUE  Per IR , pt has some weeping of R arm( Patient had superficialization of right brachiobasilic fistula 9/07/98)  Plan for R sided fistulagram 10/26  Plan for dialysis tomorrow  IPCM working on [facility] [employer] dialysis arrangements  Pt afebrile, WBC's 10 11 today, continues on po flagyl thru 10/25  Monitoring Hgb  Vital Signs:   Date/Time  Temp  Pulse  Resp  BP  MAP (mmHg)  SpO2  Calculated FIO2 (%) - Nasal Cannula  Nasal Cannula O2 Flow Rate (L/min)    10/22/21 16:07:13  97 6 °F (36 4 °C)  77  16  113/63  80  92 %  --  --    10/22/21 08:12:58  97 7 °F (36 5 °C)  79  --  111/63  79  92 %  --  --    10/21/21 16:03:47  97 6 °F (36 4 °C)  76  18  105/50  68  90 %  --  --    10/21/21 1417  98 2 °F (36 8 °C)  78  18  103/53  --  100 %  44  6 L/min mask    10/21/21 1310  96 2 °F (35 7 °C)Abnormal   76  20  95/54  --  97 %  --  --    10/21/21 1200  96 5 °F (35 8 °C)Abnormal   82  20  114/61  --  --  --  --    10/21/21 1130  --  109Abnormal   20  105/44Abnormal   --  --  --  --    10/21/21 1100  --  109Abnormal   20  104/53  --  --  --  --    10/21/21 1030  --  106Abnormal   20  115/56  --  --  --  --    10/21/21 1000  --  109Abnormal   20  90/54  --  --  --  --    10/21/21 0930  --  111Abnormal   20  102/52  --  --  --  --    10/21/21 0900  96 4 °F (35 8 °C)Abnormal   93  20  113/59  88  --  --  --          Pertinent Labs/Diagnostic Results:   10/19 Arterial duplex RUE-There is a narrowing in the subclavian vein at the clavicle created elevated  PSV and turbulent flow  The dialysis AVF appears to be matured with adequate diameter, flow volumes and  less than 6mm in depth throughout the arm     Antegrade, high resistant blunted flow noted in the peripheral arteries   No evidence of outflow obstruction  No evidence of a pseudoaneurysm  No evidence of a large venous branch  10/20   US kidney,bladder- 1  Mild right upper pole caliectasis, difficult to compare to prior CT, but likely present to some degree on that study  Multiple nonobstructing right renal calculi    2   Difficult visualization of the left kidney due to body habitus  No gross hydronephrosis  Partially visualized left perinephric drainage catheter with likely some residual perinephric collection, noting poor evaluation     3   Unchanged 7 6 cm simple right lower quadrant fluid collection and fluid collection versus free fluid in the cul-de-sac, as seen on prior CT      IR - dialysis cath placement-Successful placement of a left internal jugular vein 32 cm tunneled dialysis catheter                Results from last 7 days   Lab Units 10/22/21  0717 10/21/21  0932 10/20/21  0600 10/19/21  0600 10/18/21  1504 10/16/21  0419   WBC Thousand/uL 10 11 10 98* 11 25* 9 27 9 86 4 93   HEMOGLOBIN g/dL 7 5* 8 2* 8 5* 9 0* 8 8* 7 7*   HEMATOCRIT % 24 1* 26 8* 27 9* 29 0* 28 8* 25 7*   PLATELETS Thousands/uL 79* 96* 127* 122* 123* 148*   NEUTROS ABS Thousands/µL 8 37* 8 83*  --   --   --   --    BANDS PCT %  --   --   --   --  6 1         Results from last 7 days   Lab Units 10/22/21  0718 10/21/21  0932 10/20/21  0600 10/19/21  0600 10/18/21  0614 10/16/21  0419   SODIUM mmol/L 134* 137 138 137 139 140   POTASSIUM mmol/L 3 4* 3 6 4 2 4 2 3 9 4 1   CHLORIDE mmol/L 100 104 103 105 107 107   CO2 mmol/L 25 21 20* 19* 17* 18*   ANION GAP mmol/L 9 12 15* 13 15* 15*   BUN mg/dL 55* 92* 128* 129* 120* 99*   CREATININE mg/dL 3 86* 5 29* 6 78* 6 40* 5 83* 4 99*   EGFR ml/min/1 73sq m 11 7 5 6 7 8   CALCIUM mg/dL 8 2* 7 9* 8 3 8 6 8 5 8 3   MAGNESIUM mg/dL  --   --   --   --  2 4 2 3   PHOSPHORUS mg/dL  --   --   --   --  7 1* 5 8*     Results from last 7 days   Lab Units 10/22/21  0718 10/21/21  0992 10/20/21  0600 10/19/21  0600 10/18/21  0614   AST U/L 51* 50* 39 34 39   ALT U/L <6* <6* <6* 7* 7*   ALK PHOS U/L 206* 208* 210* 197* 210*   TOTAL PROTEIN g/dL 5 3* 5 1* 5 3* 5 2* 5 0*   ALBUMIN g/dL 1 5* 1 5* 1 5* 1 5* 1 5*   TOTAL BILIRUBIN mg/dL 1 08* 0 60 0 60 0 60 0 68         Results from last 7 days   Lab Units 10/22/21  0718 10/21/21  0932 10/20/21  0600 10/19/21  0600 10/18/21  0614 10/17/21  1646 10/16/21  0419   GLUCOSE RANDOM mg/dL 85 87 90 77 83 112 99           Results from last 7 days   Lab Units 10/18/21  1504   PROTIME seconds 18 6*   INR  1 63*         Results from last 7 days   Lab Units 10/22/21  0718   PROCALCITONIN ng/ml 36 08*     Results from last 7 days   Lab Units 10/16/21  0419   LACTIC ACID mmol/L 1 1                     Results from last 7 days   Lab Units 10/20/21  1538   HEP B S AG  Non-reactive   HEP C AB  Non-reactive   HEP B C IGM  Non-reactive   HEP B C TOTAL AB  Non-reactive               Medications:   Scheduled Medications:  apixaban, 2 5 mg, Oral, BID  atorvastatin, 40 mg, Oral, HS  calcitriol, 0 25 mcg, Oral, Daily  cholecalciferol, 800 Units, Oral, Daily  citalopram, 20 mg, Oral, Daily  docusate sodium, 100 mg, Oral, BID  levothyroxine, 75 mcg, Oral, Daily  melatonin, 3 mg, Oral, HS  metoprolol tartrate, 25 mg, Oral, BID  metroNIDAZOLE, 500 mg, Oral, Q8H LONG  pantoprazole, 40 mg, Oral, BID AC  saccharomyces boulardii, 250 mg, Oral, Daily  senna, 1 tablet, Oral, Daily  piperacillin-tazobactam (ZOSYN) 2 25 g in sodium chloride 0 9 % 50 mL IVPB   Dose: 2 25 g  Freq: Every 6 hours Route: IV  Last Dose: 2 25 g (10/20/21 0240)  Start: 10/14/21 1500 End: 10/21/21 0529    Continuous IV Infusions:sodium bicarbonate 75 mEq in sodium chloride 0 45 % 1,000 mL infusion   Rate: 50 mL/hr Dose: 50 mL/hr  Freq: Continuous Route: IV  Last Dose: 50 mL/hr (10/20/21 2211)  Start: 10/15/21 1100 End: 10/21/21 1024     PRN Meds:  acetaminophen, 650 mg, Oral, Q6H PRN x1 10/21  aluminum-magnesium hydroxide-simethicone, 30 mL, Oral, Q6H PRN  metoprolol, 2 5 mg, Intravenous, Q6H PRN  ondansetron, 4 mg, Intravenous, Q6H PRN        Discharge Plan: D    Network Utilization Review Department  ATTENTION: Please call with any questions or concerns to [phone] and carefully listen to the prompts so that you are directed to the right person  All voicemails are confidential   Mani Harrison all requests for admission clinical reviews, approved or denied determinations and any other requests to dedicated fax number below belonging to the campus where the patient is receiving treatment   List of dedicated fax numbers for the Facilities:  [address] DENIALS (Administrative/Medical Necessity) [phone]   [address] (Maternity/NICU/Pediatrics) [phone]   [address]  [address] [address] [place] [address] [address] [place] [address] [phone]

## 2021-10-22 NOTE — QUICK NOTE
Examined patient's right arm at bedside  Patient had superficialization of right brachiobasilic fistula 0/22/89  Patient with edematous right arm with superficial veins on shoulder  Does have some weeping to arm  Appears patient has subclavian stenosis in the past which has been treated with angioplasty  Will plan for right sided fistulagram Tuesday 10/26  Please keep patient npo after midnight Monday into Tuesday  Patient has been lethargic during this hospital stay  May need to obtain consent from [address] [place] [address]       Kenna Schulte PA-C

## 2021-10-22 NOTE — ASSESSMENT & PLAN NOTE
Previously with fever coupled with tachycardia and elevated procalcitonin  Likely secondary to infected left renal hematoma -> Fusobacterium bacteremia noted - fluid culture from 10/13 s/p IR-guided drainage growing Enterobacter/Enterococcus  P o   Flagyl per Infectious Disease  Monitor closely

## 2021-10-22 NOTE — PROGRESS NOTES
The pantoprazole has / have been converted to Oral per [facility] IV-to-PO Auto-Conversion Protocol for Adults as approved by the Pharmacy and Therapeutics Committee  The patient met all eligible criteria:  3 Age = 25years old   2) Received at least one dose of the IV form   3) Receiving at least one other scheduled oral/enteral medication   4) Tolerating an oral/enteral diet   and did not have any exclusions:   1) Critical care patient   2) Active GI bleed (IF assessing H2RAs or PPIs)   3) Continuous tube feeding (IF assessing cipro, doxycycline, levofloxacin, minocycline, rifampin, or voriconazole)   4) Receiving PO vancomycin (IF assessing metronidazole)   5) Persistent nausea and/or vomiting   6) Ileus or gastrointestinal obstruction   7) Miesha/nasogastric tube set for continuous suction   8) Specific order not to automatically convert to PO (in the order's comments or if discussed in the most recent Infectious Disease or primary team's progress notes)     Thanks  Brea Aiken, Pharmacist

## 2021-10-23 ENCOUNTER — APPOINTMENT (INPATIENT)
Dept: DIALYSIS | Facility: HOSPITAL | Age: 76
DRG: 981 | End: 2021-10-23
Payer: COMMERCIAL

## 2021-10-23 LAB
GLUCOSE SERPL-MCNC: 113 MG/DL (ref 65–140)
PROCALCITONIN SERPL-MCNC: 29.16 NG/ML

## 2021-10-23 PROCEDURE — 99232 SBSQ HOSP IP/OBS MODERATE 35: CPT | Performed by: INTERNAL MEDICINE

## 2021-10-23 PROCEDURE — 84145 PROCALCITONIN (PCT): CPT | Performed by: INTERNAL MEDICINE

## 2021-10-23 PROCEDURE — 82948 REAGENT STRIP/BLOOD GLUCOSE: CPT

## 2021-10-23 PROCEDURE — 90935 HEMODIALYSIS ONE EVALUATION: CPT | Performed by: INTERNAL MEDICINE

## 2021-10-23 RX ADMIN — METOPROLOL TARTRATE 25 MG: 25 TABLET, FILM COATED ORAL at 18:43

## 2021-10-23 RX ADMIN — CALCITRIOL 0.25 MCG: 0.25 CAPSULE, LIQUID FILLED ORAL at 14:34

## 2021-10-23 RX ADMIN — ATORVASTATIN CALCIUM 40 MG: 40 TABLET, FILM COATED ORAL at 22:00

## 2021-10-23 RX ADMIN — LEVOTHYROXINE SODIUM 75 MCG: 75 TABLET ORAL at 14:32

## 2021-10-23 RX ADMIN — METRONIDAZOLE 500 MG: 500 TABLET ORAL at 22:00

## 2021-10-23 RX ADMIN — APIXABAN 2.5 MG: 2.5 TABLET, FILM COATED ORAL at 14:31

## 2021-10-23 RX ADMIN — METRONIDAZOLE 500 MG: 500 TABLET ORAL at 06:23

## 2021-10-23 RX ADMIN — APIXABAN 2.5 MG: 2.5 TABLET, FILM COATED ORAL at 18:43

## 2021-10-23 RX ADMIN — CITALOPRAM HYDROBROMIDE 20 MG: 20 TABLET ORAL at 14:32

## 2021-10-23 RX ADMIN — ATORVASTATIN CALCIUM 40 MG: 40 TABLET, FILM COATED ORAL at 18:42

## 2021-10-23 RX ADMIN — MELATONIN TAB 3 MG 3 MG: 3 TAB at 22:00

## 2021-10-23 RX ADMIN — METRONIDAZOLE 500 MG: 500 TABLET ORAL at 14:32

## 2021-10-23 RX ADMIN — PANTOPRAZOLE SODIUM 40 MG: 40 TABLET, DELAYED RELEASE ORAL at 06:23

## 2021-10-23 RX ADMIN — PANTOPRAZOLE SODIUM 40 MG: 40 TABLET, DELAYED RELEASE ORAL at 18:43

## 2021-10-23 NOTE — PROGRESS NOTES
NEPHROLOGY PROGRESS NOTE   Gracie Esquivel 77 y o  female MRN: [id number]  Unit/Bed#: [facility] 929-01 Encounter: [id number]      ASSESSMENT & PLAN:  1  Injury on top of advanced chronic kidney disease likely ATN, patient was started on dialysis admission  Patient seen and examined during dialysis treatment, will continue with the TTS schedule for now  PermCath accessed with good blood flow, blood pressure is stable in the lower side, UF as tolerated for hypertension     2    Sepsis,  bacteremia, infected left renal hematoma with chronic obstructive uropathy, infection Disease on board managing antibiotics, renally dose if needed  3  Anemia secondary to GI bleeding, chronic kidney disease, history of polycythemia vera MDS, monitor H&H and transfusion as needed  No JENELLE given history of prior PE    4  Melena, status post scope, GI on board, monitor H&H    5  Hypertension blood pressure on the lower side avoid relative hypotension    6  Access, PermCath accessed with good blood flow    SUBJECTIVE:  Patient seen and examined during dialysis treatment, this is a late entry, she is somnolent but arousable, feeling tired, denies any chest pain, no shortness of breath, no abdominal pain  No issues with dialysis        OBJECTIVE:  Current Weight: Weight - Scale: 86 2 kg (190 lb)  Vitals:    10/23/21 1100   BP: 100/64   Pulse:    Resp: 16   Temp:    SpO2:        Intake/Output Summary (Last 24 hours) at 10/23/2021 1124  Last data filed at 10/23/2021 0900  Gross per 24 hour   Intake 320 ml   Output 370 ml   Net -50 ml     General:  Chronically ill, cooperative, in not acute distress  Eyes: conjunctivae pale, anicteric sclerae  ENT: lips and mucous membranes moist  Neck: supple, no JVD  Chest:  Slightly decreased breath sounds at bases, no crackles, ronchus or wheezings  CVS: distinct S1 & S2, normal rate, regular rhythm  Abdomen:  Obese, non-tender, non-distended, normoactive bowel sounds, ileal conduit in place  Extremities:  Positive edema of both legs well as upper extremities  Skin: no rash  Neuro: awake, alert, oriented  PermCath accessed with good blood flow      Medications:    Current Facility-Administered Medications:     acetaminophen (TYLENOL) tablet 650 mg, 650 mg, Oral, Q6H PRN, Kole Marcelo MD, 650 mg at 10/21/21 1215    aluminum-magnesium hydroxide-simethicone (MYLANTA) oral suspension 30 mL, 30 mL, Oral, Q6H PRN, Kole Marcelo MD    apixaban (ELIQUIS) tablet 2 5 mg, 2 5 mg, Oral, BID, Narinder Webb MD, 2 5 mg at 10/22/21 1826    atorvastatin (LIPITOR) tablet 40 mg, 40 mg, Oral, HS, Kole Marcelo MD, 40 mg at 10/22/21 2256    calcitriol (ROCALTROL) capsule 0 25 mcg, 0 25 mcg, Oral, Daily, Kole Marcelo MD, 0 25 mcg at 10/22/21 0913    cholecalciferol (VITAMIN D) oral liquid 800 Units, 800 Units, Oral, Daily, Kole Marcelo MD, 800 Units at 10/21/21 0808    citalopram (CeleXA) tablet 20 mg, 20 mg, Oral, Daily, Kole Marcelo MD, 20 mg at 10/22/21 0909    docusate sodium (COLACE) capsule 100 mg, 100 mg, Oral, BID, Kole Marcelo MD, 100 mg at 10/17/21 1140    levothyroxine tablet 75 mcg, 75 mcg, Oral, Daily, Kole Marcelo MD, 75 mcg at 10/22/21 0910    melatonin tablet 3 mg, 3 mg, Oral, HS, Kole Marcelo MD, 3 mg at 10/22/21 2256    metoprolol (LOPRESSOR) injection 2 5 mg, 2 5 mg, Intravenous, Q6H PRN, Yue Swartz PA-C, 2 5 mg at 10/20/21 1618    metoprolol tartrate (LOPRESSOR) tablet 25 mg, 25 mg, Oral, BID, Isela Funes PA-C, 25 mg at 10/22/21 1827    metroNIDAZOLE (FLAGYL) tablet 500 mg, 500 mg, Oral, Q8H [facility], Brinda Mejias MD, 500 mg at 10/23/21 0623    ondansetron (ZOFRAN) injection 4 mg, 4 mg, Intravenous, Q6H PRN, Kole Marcelo MD, 4 mg at 10/14/21 9933    pantoprazole (PROTONIX) EC tablet 40 mg, 40 mg, Oral, BID AC, Mumtaz Laguerre DO, 40 mg at 10/23/21 6311    saccharomyces boulardii (FLORASTOR) capsule 250 mg, 250 mg, Oral, Daily, Finesse Jaramillo MD, 250 mg at 10/22/21 9933    senna (SENOKOT) tablet 8 6 mg, 1 tablet, Oral, Daily, Finesse Jaramillo MD, 8 6 mg at 10/17/21 1140    Invasive Devices:        Lab Results:   Results from last 7 days   Lab Units 10/22/21  0718 10/22/21  0717 10/21/21  0932 10/20/21  0600 10/18/21  1504 10/18/21  0614   WBC Thousand/uL  --  10 11 10 98* 11 25*  --  8 01   HEMOGLOBIN g/dL  --  7 5* 8 2* 8 5*  --  9 0*   HEMATOCRIT %  --  24 1* 26 8* 27 9*  --  29 5*   PLATELETS Thousands/uL  --  79* 96* 127*  --  140*   SODIUM mmol/L 134*  --  137 138   < > 139   POTASSIUM mmol/L 3 4*  --  3 6 4 2   < > 3 9   CHLORIDE mmol/L 100  --  104 103   < > 107   CO2 mmol/L 25  --  21 20*   < > 17*   BUN mg/dL 55*  --  92* 128*   < > 120*   CREATININE mg/dL 3 86*  --  5 29* 6 78*   < > 5 83*   CALCIUM mg/dL 8 2*  --  7 9* 8 3   < > 8 5   MAGNESIUM mg/dL  --   --   --   --   --  2 4   PHOSPHORUS mg/dL  --   --   --   --   --  7 1*   ALK PHOS U/L 206*  --  208* 210*   < > 210*   ALT U/L <6*  --  <6* <6*   < > 7*   AST U/L 51*  --  50* 39   < > 39    < > = values in this interval not displayed  Previous work up:  See previous notes      Portions of the record may have been created with voice recognition software  Occasional wrong word or "sound a like" substitutions may have occurred due to the inherent limitations of voice recognition software  Read the chart carefully and recognize, using context, where substitutions have occurred  If you have any questions, please contact the dictating provider

## 2021-10-23 NOTE — PROGRESS NOTES
[address] [facility]  Progress Note - Naz Brown [birth year], 77 y o  female MRN: [id number]  Unit/Bed#: [facility] 929-01 Encounter: [id number]  Primary Care Provider: Sarah Church MD   Date and time admitted to hospital: 10/9/2021  2:19 PM    * Sepsis on admission  Assessment & Plan  Previously with fever coupled with tachycardia and elevated procalcitonin  Likely secondary to infected left renal hematoma -> Fusobacterium bacteremia noted - fluid culture from 10/13 s/p IR-guided drainage growing Enterobacter/Enterococcus  P o  Flagyl per Infectious Disease  Monitor closely    Bacteremia  Assessment & Plan  Blood cultures from 10/9 growing Fusobacterium - IV Zosyn transitioned to PO Flagyl per Infectious Disease  Repeat blood cultures 10/12 are negative    Hypothyroidism  Assessment & Plan  C/w Synthroid     Melena  Assessment & Plan  underwent endoscopy without evidence of acute bleeding  Monitor hemoglobin    Acute renal failure superimposed on CKD stage 5  Assessment & Plan  Progressively worsening renal failure  Dialysis per nephrology  Nephrology input noted    Anemia of chronic disease  Assessment & Plan  Monitor hemoglobin    Essential hypertension  Assessment & Plan  Monitor blood pressures  Avoid hypotension    History of pulmonary embolism  Assessment & Plan   s/p EGD per gastroenterology   Eliquis for anticoagulation            VTE Pharmacologic Prophylaxis: VTE Score: 13 High Risk (Score >/= 5) - Pharmacological DVT Prophylaxis Ordered: apixaban (Eliquis)  Sequential Compression Devices Ordered  Patient Centered Rounds: I performed bedside rounds with nursing staff today  Discussions with Specialists or Other Care Team Provider:     Education and Discussions with Family / Patient: Discussed with the patient  Time Spent for Care: 30 minutes  More than 50% of total time spent on counseling and coordination of care as described above      Current Length of Stay: 14 day(s)  Current Patient Status: Inpatient   Certification Statement: The patient will continue to require additional inpatient hospital stay due to As outlined  Discharge Plan: Awaiting clinical and symptomatic improvement    Code Status: Level 1 - Full Code    Subjective:     Comfortably in bed  Reports feeling tired  Patient was seen at dialysis      Objective:     Vitals:   Temp (24hrs), Av 7 °F (36 5 °C), Min:97 °F (36 1 °C), Max:98 °F (36 7 °C)    Temp:  [97 °F (36 1 °C)-98 °F (36 7 °C)] 97 °F (36 1 °C)  HR:  [78-85] 85  Resp:  [16] 16  BP: ()/(60-76) 116/75  SpO2:  [89 %-97 %] 96 %  Body mass index is 37 11 kg/m²  Input and Output Summary (last 24 hours): Intake/Output Summary (Last 24 hours) at 10/23/2021 1610  Last data filed at 10/23/2021 1318  Gross per 24 hour   Intake 800 ml   Output 2280 ml   Net -1480 ml       Physical Exam:   Physical Exam     Comfortably in bed  Features of protein calorie malnutrition noted  Neck supple  Lungs diminished breath sounds bilaterally  Heart sounds noted  Abdomen soft  Awake obeys simple commands  Pulses noted  No rash    Additional Data:     Labs:  Results from last 7 days   Lab Units 10/22/21  0717 10/19/21  0600 10/18/21  1504   WBC Thousand/uL 10 11  --  9 86   HEMOGLOBIN g/dL 7 5*  --  8 8*   HEMATOCRIT % 24 1*  --  28 8*   PLATELETS Thousands/uL 79*  --  123*   BANDS PCT %  --   --  6   NEUTROS PCT % 83*   < >  --    LYMPHS PCT % 7*   < >  --    LYMPHO PCT %  --   --  11*   MONOS PCT % 8   < >  --    MONO PCT %  --   --  6   EOS PCT % 0   < > 2    < > = values in this interval not displayed       Results from last 7 days   Lab Units 10/22/21  0718   SODIUM mmol/L 134*   POTASSIUM mmol/L 3 4*   CHLORIDE mmol/L 100   CO2 mmol/L 25   BUN mg/dL 55*   CREATININE mg/dL 3 86*   ANION GAP mmol/L 9   CALCIUM mg/dL 8 2*   ALBUMIN g/dL 1 5*   TOTAL BILIRUBIN mg/dL 1 08*   ALK PHOS U/L 206*   ALT U/L <6*   AST U/L 51*   GLUCOSE RANDOM mg/dL 85     Results from last 7 days   Lab Units 10/18/21  1504   INR  1 63*     Results from last 7 days   Lab Units 10/23/21  0757   POC GLUCOSE mg/dl 113         Results from last 7 days   Lab Units 10/23/21  0618 10/22/21  0718   PROCALCITONIN ng/ml 29 16* 36 08*       Lines/Drains:  Invasive Devices     Peripherally Inserted Central Catheter Line            PICC Line 10/11/21 11 days          Line            Hemodialysis AV Fistula 09/30/21 Right Upper arm 23 days          Hemodialysis Catheter            HD Permanent Double Catheter 3 days          Drain            Urostomy Ileal conduit RUQ 1844 days    Closed/Suction Drain Left Other (Comment) 9 days    Closed/Suction Drain Left Other (Comment) 10 Fr  9 days                Central Line:  Goal for removal: Dialysis             Imaging: Reviewed radiology reports from this admission including: ultrasound(s)    Recent Cultures (last 7 days):         Last 24 Hours Medication List:   Current Facility-Administered Medications   Medication Dose Route Frequency Provider Last Rate    acetaminophen  650 mg Oral Q6H PRN Nando Aguilera MD      aluminum-magnesium hydroxide-simethicone  30 mL Oral Q6H PRN Nando Aguilera MD      apixaban  2 5 mg Oral BID Chanel Ramos MD      atorvastatin  40 mg Oral HS Nando Aguilera MD      calcitriol  0 25 mcg Oral Daily Nando Aguilera MD      cholecalciferol  800 Units Oral Daily Nando Aguilera MD      citalopram  20 mg Oral Daily Nando Aguilera MD      docusate sodium  100 mg Oral BID Nando Aguilera MD      levothyroxine  75 mcg Oral Daily Nando Aguilera MD      melatonin  3 mg Oral HS Nando Aguilera MD      metoprolol  2 5 mg Intravenous Q6H PRN Carlos Greer PA-C      metoprolol tartrate  25 mg Oral BID Carlos Greer PA-C      metroNIDAZOLE  500 mg Oral Q8H [address] Joshua Buitrago MD      ondansetron  4 mg Intravenous Q6H PRN Nando Aguilera MD      pantoprazole  40 mg Oral BID LAURO Laguerre DO      saccharomyces boulardii  250 mg Oral Daily Alda Pitts MD      senna  1 tablet Oral Daily Alda Pitts MD          Today, Patient Was Seen By: Ollie Taylor MD    **Please Note: This note may have been constructed using a voice recognition system  **

## 2021-10-23 NOTE — PLAN OF CARE
Post-Dialysis RN Treatment Note    Blood Pressure:  Pre 120/76 mm/Hg  Post 112/66 mmHg   EDW  TBD kg    Weight:  Pre 90 1 kg   Post 88 4 kg   Mode of weight measurement: Bed Scale   Volume Removed  1500 ml    Treatment duration 240 minutes    NS given  no   Treatment shortened?  No   Medications given during Rx None Reported   Estimated Kt/V  1 54   Access type: Permacath/TDC   Access Status: Yes, describe: maintained  during tx    Report called to primary nurse   Yes  Jacque Haines RN  For 4 hr tx 2L fluid removal as tolerated, 4K bath  Problem: METABOLIC, FLUID AND ELECTROLYTES - ADULT  Goal: Electrolytes maintained within normal limits  Description: INTERVENTIONS:  - Monitor labs and assess patient for signs and symptoms of electrolyte imbalances  - Administer electrolyte replacement as ordered  - Monitor response to electrolyte replacements, including repeat lab results as appropriate  - Instruct patient on fluid and nutrition as appropriate  Outcome: Progressing  Goal: Fluid balance maintained  Description: INTERVENTIONS:  - Monitor labs   - Monitor I/O and WT  - Instruct patient on fluid and nutrition as appropriate  - Assess for signs & symptoms of volume excess or deficit  Outcome: Progressing

## 2021-10-23 NOTE — PLAN OF CARE
Problem: MOBILITY - ADULT  Goal: Maintain or return to baseline ADL function  Description: INTERVENTIONS:  -  Assess patient's ability to carry out ADLs; assess patient's baseline for ADL function and identify physical deficits which impact ability to perform ADLs (bathing, care of mouth/teeth, toileting, grooming, dressing, etc )  - Assess/evaluate cause of self-care deficits   - Assess range of motion  - Assess patient's mobility; develop plan if impaired  - Assess patient's need for assistive devices and provide as appropriate  - Encourage maximum independence but intervene and supervise when necessary  - Involve family in performance of ADLs  - Assess for home care needs following discharge   - Consider OT consult to assist with ADL evaluation and planning for discharge  - Provide patient education as appropriate  Outcome: Progressing  Goal: Maintains/Returns to pre admission functional level  Description: INTERVENTIONS:  - Perform BMAT or MOVE assessment daily    - Set and communicate daily mobility goal to care team and patient/family/caregiver  - Collaborate with rehabilitation services on mobility goals if consulted  - Perform Range of Motion 3 times a day  - Reposition patient every 2 hours    - Out of bed to chair  three times a day   - Out of bed for meals three times a day    Problem: Potential for Falls  Goal: Patient will remain free of falls  Description: INTERVENTIONS:  - Educate patient/family on patient safety including physical limitations  - Instruct patient to call for assistance with activity   - Consult OT/PT to assist with strengthening/mobility   - Keep Call bell within reach  - Keep bed low and locked with side rails adjusted as appropriate  - Keep care items and personal belongings within reach  - Initiate and maintain comfort rounds  - Make Fall Risk Sign visible to staff  - Offer Toileting every 2 Hours, in advance of need  - Initiate/Maintain bed alarm  - Obtain necessary fall risk management equipment  - Apply yellow socks and bracelet for high fall risk patients  - Consider moving patient to room near nurses station  Outcome: Progressing     - Record patient progress and toleration of activity level   Outcome: Progressing     Problem: Potential for Falls  Goal: Patient will remain free of falls  Description: INTERVENTIONS:  - Educate patient/family on patient safety including physical limitations  - Instruct patient to call for assistance with activity   - Consult OT/PT to assist with strengthening/mobility   - Keep Call bell within reach  - Keep bed low and locked with side rails adjusted as appropriate  - Keep care items and personal belongings within reach  - Initiate and maintain comfort rounds  - Make Fall Risk Sign visible to staff  - Offer Toileting every 2 Hours, in advance of need  - Initiate/Maintain bed alarm  - Obtain necessary fall risk management equipment  - Apply yellow socks and bracelet for high fall risk patients  - Consider moving patient to room near nurses station  Outcome: Progressing     Problem: Prexisting or High Potential for Compromised Skin Integrity  Goal: Skin integrity is maintained or improved  Description: INTERVENTIONS:  - Identify patients at risk for skin breakdown  - Assess and monitor skin integrity  - Assess and monitor nutrition and hydration status  - Monitor labs   - Assess for incontinence   - Turn and reposition patient  - Assist with mobility/ambulation  - Relieve pressure over bony prominences  - Avoid friction and shearing  - Provide appropriate hygiene as needed including keeping skin clean and dry  - Evaluate need for skin moisturizer/barrier cream  - Collaborate with interdisciplinary team   - Patient/family teaching  - Consider wound care consult   Outcome: Progressing     Problem: Nutrition/Hydration-ADULT  Goal: Nutrient/Hydration intake appropriate for improving, restoring or maintaining nutritional needs  Description: Monitor and assess patient's nutrition/hydration status for malnutrition  Collaborate with interdisciplinary team and initiate plan and interventions as ordered  Monitor patient's weight and dietary intake as ordered or per policy  Utilize nutrition screening tool and intervene as necessary  Determine patient's food preferences and provide high-protein, high-caloric foods as appropriate       INTERVENTIONS:  - Monitor oral intake, urinary output, labs, and treatment plans  - Assess nutrition and hydration status and recommend course of action  - Evaluate amount of meals eaten  - Assist patient with eating if necessary   - Allow adequate time for meals  - Recommend/ encourage appropriate diets, oral nutritional supplements, and vitamin/mineral supplements  - Order, calculate, and assess calorie counts as needed  - Recommend, monitor, and adjust tube feedings and TPN/PPN based on assessed needs  - Assess need for intravenous fluids  - Provide specific nutrition/hydration education as appropriate  - Include patient/family/caregiver in decisions related to nutrition  Outcome: Progressing     Problem: METABOLIC, FLUID AND ELECTROLYTES - ADULT  Goal: Electrolytes maintained within normal limits  Description: INTERVENTIONS:  - Monitor labs and assess patient for signs and symptoms of electrolyte imbalances  - Administer electrolyte replacement as ordered  - Monitor response to electrolyte replacements, including repeat lab results as appropriate  - Instruct patient on fluid and nutrition as appropriate  Outcome: Progressing  Goal: Fluid balance maintained  Description: INTERVENTIONS:  - Monitor labs   - Monitor I/O and WT  - Instruct patient on fluid and nutrition as appropriate  - Assess for signs & symptoms of volume excess or deficit  Outcome: Progressing

## 2021-10-24 PROCEDURE — 99232 SBSQ HOSP IP/OBS MODERATE 35: CPT | Performed by: INTERNAL MEDICINE

## 2021-10-24 RX ADMIN — PANTOPRAZOLE SODIUM 40 MG: 40 TABLET, DELAYED RELEASE ORAL at 06:50

## 2021-10-24 RX ADMIN — METRONIDAZOLE 500 MG: 500 TABLET ORAL at 06:50

## 2021-10-24 RX ADMIN — APIXABAN 2.5 MG: 2.5 TABLET, FILM COATED ORAL at 12:20

## 2021-10-24 RX ADMIN — ATORVASTATIN CALCIUM 40 MG: 40 TABLET, FILM COATED ORAL at 23:14

## 2021-10-24 RX ADMIN — APIXABAN 2.5 MG: 2.5 TABLET, FILM COATED ORAL at 17:24

## 2021-10-24 RX ADMIN — LEVOTHYROXINE SODIUM 75 MCG: 75 TABLET ORAL at 12:19

## 2021-10-24 RX ADMIN — METOPROLOL TARTRATE 25 MG: 25 TABLET, FILM COATED ORAL at 12:25

## 2021-10-24 RX ADMIN — CALCITRIOL 0.25 MCG: 0.25 CAPSULE, LIQUID FILLED ORAL at 12:22

## 2021-10-24 RX ADMIN — MELATONIN TAB 3 MG 3 MG: 3 TAB at 23:14

## 2021-10-24 RX ADMIN — CITALOPRAM HYDROBROMIDE 20 MG: 20 TABLET ORAL at 12:20

## 2021-10-24 RX ADMIN — METRONIDAZOLE 500 MG: 500 TABLET ORAL at 23:14

## 2021-10-24 NOTE — ASSESSMENT & PLAN NOTE
Hx of Polycythemia vera and MDS  Significant anemia secondary to blood loss in the past    The patient is currently off of the [facility] treatment for her PV and getting mainly Aranesp on every 28 day basis    Hematology consulted for evaluation, recommend once patient is better/ stable, discharge, she will follow-up with Dr Felecia Morrow, her primary hematologist

## 2021-10-24 NOTE — PROGRESS NOTES
[facility]  Progress Note - Pam Guzman [birth year], 77 y o  female MRN: [id number]  Unit/Bed#: [facility] 929-01 Encounter: [id number]  Primary Care Provider: Ronak Tyson MD   Date and time admitted to hospital: 10/9/2021  2:19 PM    * Sepsis on admission  Assessment & Plan  Previously with fever coupled with tachycardia and elevated procalcitonin  Likely secondary to infected left renal hematoma -> Fusobacterium bacteremia noted - fluid culture from 10/13 s/p IR-guided drainage growing Enterobacter/Enterococcus  P o  Flagyl per Infectious Disease  Monitor closely    Bacteremia  Assessment & Plan  Blood cultures from 10/9 growing Fusobacterium - IV Zosyn transitioned to PO Flagyl per Infectious Disease  Repeat blood cultures 10/12 are negative    Hypothyroidism  Assessment & Plan  C/w Synthroid     Melena  Assessment & Plan  underwent endoscopy without evidence of acute bleeding  Monitor hemoglobin    Acute renal failure superimposed on CKD stage 5  Assessment & Plan  Progressively worsening renal failure  Dialysis per nephrology  Nephrology input noted    Anemia of chronic disease  Assessment & Plan  Monitor hemoglobin    Polycythemia vera ([address] )  Assessment & Plan  Hx of Polycythemia vera and MDS  Significant anemia secondary to blood loss in the past    The patient is currently off of the [facility] treatment for her PV and getting mainly Aranesp on every 28 day basis  Hematology consulted for evaluation, recommend once patient is better/ stable, discharge, she will follow-up with Dr Felecia Morrow, her primary hematologist     Essential hypertension  Assessment & Plan  Monitor blood pressures  Avoid hypotension    History of pulmonary embolism  Assessment & Plan   s/p EGD per gastroenterology   Eliquis for anticoagulation            VTE Pharmacologic Prophylaxis: VTE Score: 13 High Risk (Score >/= 5) - Pharmacological DVT Prophylaxis Ordered: apixaban (Eliquis)   Sequential Compression Devices Ordered  Patient Centered Rounds: I performed bedside rounds with nursing staff today  Discussions with Specialists or Other Care Team Provider:     Education and Discussions with Family / Patient: Patient, called and left message for son Nicolette Albert  Time Spent for Care: 30 minutes  More than 50% of total time spent on counseling and coordination of care as described above  Current Length of Stay: 15 day(s)  Current Patient Status: Inpatient   Certification Statement: The patient will continue to require additional inpatient hospital stay due to As outlined  Discharge Plan: Awaiting clinical and symptomatic improvement    Code Status: Level 1 - Full Code    Subjective:     Reports feeling tired  Poor appetite  Encouraged out of bed   Encouraged incentive spirometry  Discussed with RN    Objective:     Vitals:   Temp (24hrs), Av 9 °F (36 6 °C), Min:97 °F (36 1 °C), Max:98 5 °F (36 9 °C)    Temp:  [97 °F (36 1 °C)-98 5 °F (36 9 °C)] 98 1 °F (36 7 °C)  HR:  [77-85] 77  Resp:  [16-20] 20  BP: (100-116)/(58-75) 103/60  SpO2:  [93 %-99 %] 99 %  Body mass index is 37 11 kg/m²  Input and Output Summary (last 24 hours):      Intake/Output Summary (Last 24 hours) at 10/24/2021 1235  Last data filed at 10/24/2021 0407  Gross per 24 hour   Intake 600 ml   Output 2125 ml   Net -1525 ml       Physical Exam:   Physical Exam     Comfortably in bed  Obese  Short thick neck  Lungs diminished breath sounds bilaterally  Heart sounds S1-S2 noted  Abdomen soft  Awake obeys simple commands  Extremity edema noted  Pulses noted  No rash    Additional Data:     Labs:  Results from last 7 days   Lab Units 10/22/21  0717 10/19/21  0600 10/18/21  1504   WBC Thousand/uL 10 11  --  9 86   HEMOGLOBIN g/dL 7 5*  --  8 8*   HEMATOCRIT % 24 1*  --  28 8*   PLATELETS Thousands/uL 79*  --  123*   BANDS PCT %  --   --  6   NEUTROS PCT % 83*   < >  --    LYMPHS PCT % 7*   < >  --    LYMPHO PCT %  --   --  11*   MONOS PCT % 8   < >  --    MONO PCT %  --   --  6   EOS PCT % 0   < > 2    < > = values in this interval not displayed  Results from last 7 days   Lab Units 10/22/21  0718   SODIUM mmol/L 134*   POTASSIUM mmol/L 3 4*   CHLORIDE mmol/L 100   CO2 mmol/L 25   BUN mg/dL 55*   CREATININE mg/dL 3 86*   ANION GAP mmol/L 9   CALCIUM mg/dL 8 2*   ALBUMIN g/dL 1 5*   TOTAL BILIRUBIN mg/dL 1 08*   ALK PHOS U/L 206*   ALT U/L <6*   AST U/L 51*   GLUCOSE RANDOM mg/dL 85     Results from last 7 days   Lab Units 10/18/21  1504   INR  1 63*     Results from last 7 days   Lab Units 10/23/21  0757   POC GLUCOSE mg/dl 113         Results from last 7 days   Lab Units 10/23/21  0618 10/22/21  0718   PROCALCITONIN ng/ml 29 16* 36 08*       Lines/Drains:  Invasive Devices     Peripherally Inserted Central Catheter Line            PICC Line 10/11/21 12 days          Line            Hemodialysis AV Fistula 09/30/21 Right Upper arm 24 days          Hemodialysis Catheter            HD Permanent Double Catheter 3 days          Drain            Urostomy Ileal conduit RUQ 1845 days    Closed/Suction Drain Left Other (Comment) 10 days    Closed/Suction Drain Left Other (Comment) 10 Fr   10 days                Central Line:  Goal for removal: Dialysis catheter             Imaging: Reviewed radiology reports from this admission including: ultrasound(s)    Recent Cultures (last 7 days):         Last 24 Hours Medication List:   Current Facility-Administered Medications   Medication Dose Route Frequency Provider Last Rate    acetaminophen  650 mg Oral Q6H PRN Tristin Zhang MD      aluminum-magnesium hydroxide-simethicone  30 mL Oral Q6H PRN Tristin Zhang MD      apixaban  2 5 mg Oral BID Cristofer Emerson MD      atorvastatin  40 mg Oral HS Tristin Zhang MD      calcitriol  0 25 mcg Oral Daily Tristin Zhang MD      cholecalciferol  800 Units Oral Daily Tristin Zhang MD      citalopram  20 mg Oral Daily MD Nhi Suggs docusate sodium  100 mg Oral BID Salina Bamberger, MD      levothyroxine  75 mcg Oral Daily Salina Bamberger, MD      melatonin  3 mg Oral HS Salina Bamberger, MD      metoprolol  2 5 mg Intravenous Q6H PRN Jay Schwab PA-C      metoprolol tartrate  25 mg Oral BID Jay Schwab PA-C      metroNIDAZOLE  500 mg Oral [facility] Luisa Isaac MD      ondansetron  4 mg Intravenous Q6H PRN Salina Bamberger, MD      pantoprazole  40 mg Oral BID LAURO Laguerre DO      saccharomyces boulardii  250 mg Oral Daily Salina Bamberger, MD      senna  1 tablet Oral Daily Salina Bamberger, MD          Today, Patient Was Seen By: Samy Patel MD    **Please Note: This note may have been constructed using a voice recognition system  **

## 2021-10-24 NOTE — PROGRESS NOTES
NEPHROLOGY PROGRESS NOTE   Pam Guzman 77 y o  female MRN: [id number]  Unit/Bed#: [facility] 929-01 Encounter: [id number]      ASSESSMENT & PLAN:  1  Injury on top of advanced chronic kidney disease likely ATN, patient was started on dialysis admission  Plan to continue with dialysis on a TTS schedule  Last dialysis treatment yesterday  Currently using PermCath   Patient also with AV fistula in place status post transposition    2  Sepsis,  bacteremia, infected left renal hematoma with chronic obstructive uropathy, infection Disease on board managing antibiotics, renally dose if needed  3  Anemia secondary to GI bleeding, chronic kidney disease, history of polycythemia vera MDS, monitor H&H and transfusion as needed  No JENELLE given history of prior PE, hemoglobin 7 5 on 10/22    4  Melena, status post scope, GI on board, monitor H&H    5  Hypertension blood pressure on the lower side avoid relative hypotension    6  Access, PermCath in place  Patient with right brachial basilic AV fistula placed on 01/2021, status post fistulogram with angioplasty, status post superficialization transposition on 09/30 by Dr Chairez        SUBJECTIVE:  Patient seen and examined, feeling tired and weak, denies chest pain, shortness of breath, abdominal pain, no nausea vomiting      OBJECTIVE:  Current Weight: Weight - Scale: 86 2 kg (190 lb)  Vitals:    10/24/21 1223   BP: 103/60   Pulse: 77   Resp:    Temp:    SpO2: 99%       Intake/Output Summary (Last 24 hours) at 10/24/2021 1254  Last data filed at 10/24/2021 0407  Gross per 24 hour   Intake 600 ml   Output 2125 ml   Net -1525 ml     General:  Chronically ill, fragile, weak, cooperative, in not acute distress  Eyes: conjunctivae pale, anicteric sclerae  ENT: lips and mucous membranes moist  Neck: supple, no JVD  Chest: clear breath sounds bilateral, no crackles, ronchus or wheezings  CVS: distinct S1 & S2, normal rate, regular rhythm  Abdomen:  Obese, non-tender, non-distended, normoactive bowel sounds, ileostomy in place  Extremities:  Positive edema of both legs as well as upper extremities, , AV fistula in place  Skin: no rash  Neuro: awake, alert, interactive        Medications:    Current Facility-Administered Medications:     acetaminophen (TYLENOL) tablet 650 mg, 650 mg, Oral, Q6H PRN, Finesse Jaramillo MD, 650 mg at 10/21/21 1215    aluminum-magnesium hydroxide-simethicone (MYLANTA) oral suspension 30 mL, 30 mL, Oral, Q6H PRN, Finesse Jaramillo MD    apixaban (ELIQUIS) tablet 2 5 mg, 2 5 mg, Oral, BID, Mylene Arellano MD, 2 5 mg at 10/24/21 1220    atorvastatin (LIPITOR) tablet 40 mg, 40 mg, Oral, HS, Finesse Jaramillo MD, 40 mg at 10/23/21 2200    calcitriol (ROCALTROL) capsule 0 25 mcg, 0 25 mcg, Oral, Daily, Finesse Jaramillo MD, 0 25 mcg at 10/24/21 1222    cholecalciferol (VITAMIN D) oral liquid 800 Units, 800 Units, Oral, Daily, Finesse Jaramillo MD, 800 Units at 10/24/21 1221    citalopram (CeleXA) tablet 20 mg, 20 mg, Oral, Daily, Finesse Jaramillo MD, 20 mg at 10/24/21 1220    docusate sodium (COLACE) capsule 100 mg, 100 mg, Oral, BID, Finesse Jaramillo MD, 100 mg at 10/17/21 1140    levothyroxine tablet 75 mcg, 75 mcg, Oral, Daily, Finesse Jaramillo MD, 75 mcg at 10/24/21 1219    melatonin tablet 3 mg, 3 mg, Oral, HS, Finesse Jaramillo MD, 3 mg at 10/23/21 2200    metoprolol (LOPRESSOR) injection 2 5 mg, 2 5 mg, Intravenous, Q6H PRN, Mary Dumont PA-C, 2 5 mg at 10/20/21 1618    metoprolol tartrate (LOPRESSOR) tablet 25 mg, 25 mg, Oral, BID, Isela Funes PA-C, 25 mg at 10/24/21 1225    metroNIDAZOLE (FLAGYL) tablet 500 mg, 500 mg, Oral, Q8H [address], Camryn Correia MD, 500 mg at 10/24/21 0650    ondansetron (ZOFRAN) injection 4 mg, 4 mg, Intravenous, Q6H PRN, Finesse Jaramillo MD, 4 mg at 10/14/21 4095    pantoprazole (PROTONIX) EC tablet 40 mg, 40 mg, Oral, BID AC, Mumtaz Laguerre DO, 40 mg at 10/24/21 0650    saccharomyces boulardii (FLORASTOR) capsule 250 mg, 250 mg, Oral, Daily, Stuart Green MD, 250 mg at 10/22/21 0909    senna (SENOKOT) tablet 8 6 mg, 1 tablet, Oral, Daily, Stuart Green MD, 8 6 mg at 10/17/21 1140    Invasive Devices:        Lab Results:   Results from last 7 days   Lab Units 10/22/21  0718 10/22/21  0717 10/21/21  0932 10/20/21  0600 10/18/21  1504 10/18/21  0614   WBC Thousand/uL  --  10 11 10 98* 11 25*  --  8 01   HEMOGLOBIN g/dL  --  7 5* 8 2* 8 5*  --  9 0*   HEMATOCRIT %  --  24 1* 26 8* 27 9*  --  29 5*   PLATELETS Thousands/uL  --  79* 96* 127*  --  140*   SODIUM mmol/L 134*  --  137 138   < > 139   POTASSIUM mmol/L 3 4*  --  3 6 4 2   < > 3 9   CHLORIDE mmol/L 100  --  104 103   < > 107   CO2 mmol/L 25  --  21 20*   < > 17*   BUN mg/dL 55*  --  92* 128*   < > 120*   CREATININE mg/dL 3 86*  --  5 29* 6 78*   < > 5 83*   CALCIUM mg/dL 8 2*  --  7 9* 8 3   < > 8 5   MAGNESIUM mg/dL  --   --   --   --   --  2 4   PHOSPHORUS mg/dL  --   --   --   --   --  7 1*   ALK PHOS U/L 206*  --  208* 210*   < > 210*   ALT U/L <6*  --  <6* <6*   < > 7*   AST U/L 51*  --  50* 39   < > 39    < > = values in this interval not displayed  Previous work up:  See previous notes      Portions of the record may have been created with voice recognition software  Occasional wrong word or "sound a like" substitutions may have occurred due to the inherent limitations of voice recognition software  Read the chart carefully and recognize, using context, where substitutions have occurred  If you have any questions, please contact the dictating provider

## 2021-10-25 PROCEDURE — 99232 SBSQ HOSP IP/OBS MODERATE 35: CPT | Performed by: STUDENT IN AN ORGANIZED HEALTH CARE EDUCATION/TRAINING PROGRAM

## 2021-10-25 PROCEDURE — 97535 SELF CARE MNGMENT TRAINING: CPT

## 2021-10-25 PROCEDURE — 99232 SBSQ HOSP IP/OBS MODERATE 35: CPT | Performed by: INTERNAL MEDICINE

## 2021-10-25 PROCEDURE — NC001 PR NO CHARGE: Performed by: NURSE PRACTITIONER

## 2021-10-25 PROCEDURE — 97530 THERAPEUTIC ACTIVITIES: CPT

## 2021-10-25 RX ADMIN — METRONIDAZOLE 500 MG: 500 TABLET ORAL at 06:38

## 2021-10-25 RX ADMIN — ATORVASTATIN CALCIUM 40 MG: 40 TABLET, FILM COATED ORAL at 21:27

## 2021-10-25 RX ADMIN — APIXABAN 2.5 MG: 2.5 TABLET, FILM COATED ORAL at 09:23

## 2021-10-25 RX ADMIN — Medication 250 MG: at 09:23

## 2021-10-25 RX ADMIN — APIXABAN 2.5 MG: 2.5 TABLET, FILM COATED ORAL at 17:26

## 2021-10-25 RX ADMIN — STANDARDIZED SENNA CONCENTRATE 8.6 MG: 8.6 TABLET ORAL at 09:20

## 2021-10-25 RX ADMIN — PANTOPRAZOLE SODIUM 40 MG: 40 TABLET, DELAYED RELEASE ORAL at 06:38

## 2021-10-25 RX ADMIN — PANTOPRAZOLE SODIUM 40 MG: 40 TABLET, DELAYED RELEASE ORAL at 17:26

## 2021-10-25 RX ADMIN — METRONIDAZOLE 500 MG: 500 TABLET ORAL at 13:14

## 2021-10-25 RX ADMIN — METOPROLOL TARTRATE 25 MG: 25 TABLET, FILM COATED ORAL at 09:20

## 2021-10-25 RX ADMIN — DOCUSATE SODIUM 100 MG: 100 CAPSULE, LIQUID FILLED ORAL at 09:20

## 2021-10-25 RX ADMIN — MELATONIN TAB 3 MG 3 MG: 3 TAB at 21:27

## 2021-10-25 RX ADMIN — METRONIDAZOLE 500 MG: 500 TABLET ORAL at 21:27

## 2021-10-25 RX ADMIN — DOCUSATE SODIUM 100 MG: 100 CAPSULE, LIQUID FILLED ORAL at 17:26

## 2021-10-25 RX ADMIN — CALCITRIOL 0.25 MCG: 0.25 CAPSULE, LIQUID FILLED ORAL at 09:25

## 2021-10-25 RX ADMIN — CITALOPRAM HYDROBROMIDE 20 MG: 20 TABLET ORAL at 09:23

## 2021-10-25 RX ADMIN — METOPROLOL TARTRATE 25 MG: 25 TABLET, FILM COATED ORAL at 17:26

## 2021-10-25 RX ADMIN — LEVOTHYROXINE SODIUM 75 MCG: 75 TABLET ORAL at 09:20

## 2021-10-25 NOTE — PLAN OF CARE
Problem: MOBILITY - ADULT  Goal: Maintain or return to baseline ADL function  Description: INTERVENTIONS:  -  Assess patient's ability to carry out ADLs; assess patient's baseline for ADL function and identify physical deficits which impact ability to perform ADLs (bathing, care of mouth/teeth, toileting, grooming, dressing, etc )  - Assess/evaluate cause of self-care deficits   - Assess range of motion  - Assess patient's mobility; develop plan if impaired  - Assess patient's need for assistive devices and provide as appropriate  - Encourage maximum independence but intervene and supervise when necessary  - Involve family in performance of ADLs  - Assess for home care needs following discharge   - Consider OT consult to assist with ADL evaluation and planning for discharge  - Provide patient education as appropriate  Outcome: Progressing  Goal: Maintains/Returns to pre admission functional level  Description: INTERVENTIONS:  - Perform BMAT or MOVE assessment daily    - Set and communicate daily mobility goal to care team and patient/family/caregiver  - Collaborate with rehabilitation services on mobility goals if consulted  - Perform Range of Motion 3 times a day  - Reposition patient every 3 hours    - Dangle patient 3 times a day  - Stand patient 3 times a day  - Ambulate patient 3 times a day  - Out of bed to chair 3 times a day   - Out of bed for meals 3 times a day  - Out of bed for toileting  - Record patient progress and toleration of activity level   Outcome: Progressing     Problem: Potential for Falls  Goal: Patient will remain free of falls  Description: INTERVENTIONS:  - Educate patient/family on patient safety including physical limitations  - Instruct patient to call for assistance with activity   - Consult OT/PT to assist with strengthening/mobility   - Keep Call bell within reach  - Keep bed low and locked with side rails adjusted as appropriate  - Keep care items and personal belongings within reach  - Initiate and maintain comfort rounds  - Make Fall Risk Sign visible to staff  - Offer Toileting every 3 Hours, in advance of need  - Initiate/Maintain 3 alarm  - Obtain necessary fall risk management equipment: 3  - Apply yellow socks and bracelet for high fall risk patients  - Consider moving patient to room near nurses station  Outcome: Progressing     Problem: Prexisting or High Potential for Compromised Skin Integrity  Goal: Skin integrity is maintained or improved  Description: INTERVENTIONS:  - Identify patients at risk for skin breakdown  - Assess and monitor skin integrity  - Assess and monitor nutrition and hydration status  - Monitor labs   - Assess for incontinence   - Turn and reposition patient  - Assist with mobility/ambulation  - Relieve pressure over bony prominences  - Avoid friction and shearing  - Provide appropriate hygiene as needed including keeping skin clean and dry  - Evaluate need for skin moisturizer/barrier cream  - Collaborate with interdisciplinary team   - Patient/family teaching  - Consider wound care consult   Outcome: Progressing     Problem: Nutrition/Hydration-ADULT  Goal: Nutrient/Hydration intake appropriate for improving, restoring or maintaining nutritional needs  Description: Monitor and assess patient's nutrition/hydration status for malnutrition  Collaborate with interdisciplinary team and initiate plan and interventions as ordered  Monitor patient's weight and dietary intake as ordered or per policy  Utilize nutrition screening tool and intervene as necessary  Determine patient's food preferences and provide high-protein, high-caloric foods as appropriate       INTERVENTIONS:  - Monitor oral intake, urinary output, labs, and treatment plans  - Assess nutrition and hydration status and recommend course of action  - Evaluate amount of meals eaten  - Assist patient with eating if necessary   - Allow adequate time for meals  - Recommend/ encourage appropriate diets, oral nutritional supplements, and vitamin/mineral supplements  - Order, calculate, and assess calorie counts as needed  - Recommend, monitor, and adjust tube feedings and TPN/PPN based on assessed needs  - Assess need for intravenous fluids  - Provide specific nutrition/hydration education as appropriate  - Include patient/family/caregiver in decisions related to nutrition  Outcome: Progressing     Problem: METABOLIC, FLUID AND ELECTROLYTES - ADULT  Goal: Electrolytes maintained within normal limits  Description: INTERVENTIONS:  - Monitor labs and assess patient for signs and symptoms of electrolyte imbalances  - Administer electrolyte replacement as ordered  - Monitor response to electrolyte replacements, including repeat lab results as appropriate  - Instruct patient on fluid and nutrition as appropriate  Outcome: Progressing  Goal: Fluid balance maintained  Description: INTERVENTIONS:  - Monitor labs   - Monitor I/O and WT  - Instruct patient on fluid and nutrition as appropriate  - Assess for signs & symptoms of volume excess or deficit  Outcome: Progressing

## 2021-10-25 NOTE — PLAN OF CARE
Problem: PHYSICAL THERAPY ADULT  Goal: Performs mobility at highest level of function for planned discharge setting  See evaluation for individualized goals  Description: Treatment/Interventions: Patient/family training, LE strengthening/ROM, Bed mobility, Spoke to nursing, Spoke to case management, OT  Equipment Recommended:  (TBD )       See flowsheet documentation for full assessment, interventions and recommendations  Note: Prognosis: Fair  Problem List: Decreased strength, Decreased endurance, Impaired balance, Decreased mobility, Decreased cognition, Decreased skin integrity, Obesity  Assessment: PT INITIATED TREATMENT SESSION IN ORDER TO ASSIST PATIENT IN ACHIEVING GOALS TO IMPROVE TRANSFERS, SITTING BALANCE, AND OVERALL ACTIVITY TOLERANCE  PATIENT OVERALL LETHARGIC BUT COOPERATIVE  PATIENT TRANSFERRED OOB FOR 1ST TIME, PERFORMING STAND PIVOT TRANSFER WITH MAX-AX2 AND HAND HELD ASSISTANCE  SITTING EOB PRIOR TO TRANSFER PATIENT ABLE TO TOLERATE 8 MINUTES S LEVEL  THROUGHOUT TREATMENT SESSION PATIENT REQUIRED BOTH VERBAL AND TACTILE CUING TO IMPROVE SAFETY, EFFICIENCY, AND MECHANICS OF MOBILITY IN ADDITION TO HANDS ON ASSISTANCE FOR ALL ASPECTS OF FUNCTIONAL MOBILITY  ADDITIONALLY, SHE REQUIRED INCREASED TIME TO EXECUTE SPECIFIC MOBILITY TASKS WITH REST BREAKS IN BETWEEN SECONDARY TO GROSS FATIGUE AND WEAKNESS  PT D/C RECOMMENDATION REMAINS FOR REHAB  PATIENT WILL BENEFIT FROM CONTINUED SKILLED PT THIS ADMISSION TO ACHIEVE MAXIMAL FUNCTION AND SAFETY  Barriers to Discharge: Inaccessible home environment, Decreased caregiver support        PT Discharge Recommendation: Post acute rehabilitation services     PT - OK to Discharge: Yes (TO REHAB WHEN MED BRODERICK )    See flowsheet documentation for full assessment

## 2021-10-25 NOTE — PROGRESS NOTES
[address] [facility]  Progress Note - Susan Falcon [birth year], 77 y o  female MRN: [id number]  Unit/Bed#: [facility] 929-01 Encounter: [id number]  Primary Care Provider: Ailyn Coughlin MD   Date and time admitted to hospital: 10/9/2021  2:19 PM    Renal hematoma, left  Assessment & Plan    Left renal hematoma:   Presented with left renal hematoma  Pigtail catheter is noted medial to kidney  Renal pelvis may be collapsed  Hemorrhage and thickening extending in the combined interfascial place of retrospective as well as some high density fluid within  S/p IR drainage 10/12/21 with JOSE ANTONIO drain placement x 2  Output appears to be 30mL from both drains over 24 hour period  General surgery / urology do not plan for surgical intervention  Will need to further discuss when drain removal appropriate  Obstructive uropathy  Assessment & Plan  Obstructive nephropathy  Chronic bilateral hydronephrosis stents were recently removed  Urology and Nephrology following; recommend continuing antibiotics and present treatment      * Sepsis on admission  Assessment & Plan  Previously with fever coupled with tachycardia and elevated procalcitonin  Likely secondary to infected left renal hematoma -> Fusobacterium bacteremia noted - fluid culture from 10/13 s/p IR-guided drainage growing Enterobacter/Enterococcus  P o   Flagyl per Infectious Disease, today should be last day  Monitor closely    Bacteremia  Assessment & Plan  Blood cultures from 10/9 growing Fusobacterium - IV Zosyn transitioned to PO Flagyl per Infectious Disease  Repeat blood cultures 10/12 are negative    Thrombocytopenia  Assessment & Plan  Monitor platelet count - monitor for bleeding    Hypothyroidism  Assessment & Plan  C/w Synthroid     Melena  Assessment & Plan  underwent endoscopy without evidence of acute bleeding  Monitor hemoglobin    Swelling of right upper extremity  Assessment & Plan  Underwent repeat RUE vascular doppler study noting "a narrowing in the subclavian vein at the clavicle created elevated  PSV and turbulent flow  The dialysis AVF appears to be matured with adequate diameter, flow volumes and less than 6mm in depth throughout the arm  Antegrade, high resistant blunted flow noted in the peripheral arteries  No evidence of outflow obstruction  No evidence of a pseudoaneurysm  No evidence of a large venous branch "  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  Supportive care otherwise    Pleural effusion  Assessment & Plan  Mild to moderate pleural effusion on CXR  S/p thoracentesis by IR 10/11/21   -patient currently comfortable on room air, chest x-ray from prior shows persistent left pleural effusion      Acute renal failure superimposed on CKD stage 5  Assessment & Plan  Patient developed progressively worsening renal failure leading to ATN  Currently being treated with HD  Unsure if patient will recover or become ESRD  Next HD is tomorrow  Nephrology input noted    Anemia of chronic disease  Assessment & Plan  Monitor hemoglobin    Polycythemia vera ([address] )  Assessment & Plan  Hx of Polycythemia vera and MDS  Significant anemia secondary to blood loss in the past    The patient is currently off of the [facility] treatment for her PV and getting mainly Aranesp on every 28 day basis    Hematology consulted for evaluation, recommend once patient is better/ stable, discharge, she will follow-up with Dr Kristen Cintron, her primary hematologist     Essential hypertension  Assessment & Plan  Monitor blood pressures  Avoid hypotension    History of pulmonary embolism  Assessment & Plan  Eliquis for anticoagulation    Oliguria-resolved as of 10/25/2021  Assessment & Plan  Management as above    Chronic kidney disease-resolved as of 10/25/2021  Assessment & Plan  Lab Results   Component Value Date    EGFR 11 10/22/2021    EGFR 7 10/21/2021    EGFR 5 10/20/2021    CREATININE 3 86 (H) 10/22/2021    CREATININE 5 29 (H) 10/21/2021    CREATININE 6 78 (H) 10/20/2021             VTE Pharmacologic Prophylaxis: VTE Score: 13 High Risk (Score >/= 5) - Pharmacological DVT Prophylaxis Ordered: apixaban (Eliquis)  Sequential Compression Devices Ordered  Patient Centered Rounds: I performed bedside rounds with nursing staff today  Discussions with Specialists or Other Care Team Provider: Nephro    Education and Discussions with Family / Patient: Attempted to update  (son) via phone  Left voicemail  Time Spent for Care: 30 minutes  More than 50% of total time spent on counseling and coordination of care as described above  Current Length of Stay: 16 day(s)  Current Patient Status: Inpatient   Certification Statement: The patient will continue to require additional inpatient hospital stay due to placement  Discharge Plan: Anticipate discharge in 24-48 hrs to discharge location to be determined pending rehab evaluations  Code Status: Level 1 - Full Code    Subjective:   Patient seen examined at bedside  No acute events overnight  Currently has no complaints  Denies any fevers chills chest pain shortness of breath pain  Objective:     Vitals:   Temp (24hrs), Av 5 °F (36 9 °C), Min:98 2 °F (36 8 °C), Max:98 8 °F (37 1 °C)    Temp:  [98 2 °F (36 8 °C)-98 8 °F (37 1 °C)] 98 8 °F (37 1 °C)  HR:  [75-83] 82  Resp:  [16-18] 18  BP: (101-122)/(60-70) 122/70  SpO2:  [96 %-99 %] 96 %  Body mass index is 37 11 kg/m²  Input and Output Summary (last 24 hours): Intake/Output Summary (Last 24 hours) at 10/25/2021 1208  Last data filed at 10/25/2021 1001  Gross per 24 hour   Intake 140 ml   Output 50 ml   Net 90 ml       Physical Exam:   Physical Exam  Constitutional:       Appearance: She is obese  HENT:      Head: Normocephalic and atraumatic        Right Ear: External ear normal       Left Ear: External ear normal       Nose: Nose normal       Mouth/Throat:      Mouth: Mucous membranes are moist       Pharynx: Oropharynx is clear  Eyes:      Extraocular Movements: Extraocular movements intact  Cardiovascular:      Rate and Rhythm: Normal rate  Rhythm irregular  Pulses: Normal pulses  Heart sounds: Normal heart sounds  Pulmonary:      Effort: Pulmonary effort is normal       Breath sounds: Rales present  Abdominal:      General: Abdomen is flat  Palpations: Abdomen is soft  Tenderness: There is no abdominal tenderness  Musculoskeletal:         General: Normal range of motion  Cervical back: Normal range of motion  Skin:     General: Skin is warm  Neurological:      General: No focal deficit present  Mental Status: She is alert and oriented to person, place, and time  Psychiatric:         Mood and Affect: Mood normal           Additional Data:     Labs:  Results from last 7 days   Lab Units 10/22/21  0717 10/19/21  0600 10/18/21  1504   WBC Thousand/uL 10 11  --  9 86   HEMOGLOBIN g/dL 7 5*  --  8 8*   HEMATOCRIT % 24 1*  --  28 8*   PLATELETS Thousands/uL 79*  --  123*   BANDS PCT %  --   --  6   NEUTROS PCT % 83*   < >  --    LYMPHS PCT % 7*   < >  --    LYMPHO PCT %  --   --  11*   MONOS PCT % 8   < >  --    MONO PCT %  --   --  6   EOS PCT % 0   < > 2    < > = values in this interval not displayed       Results from last 7 days   Lab Units 10/22/21  0718   SODIUM mmol/L 134*   POTASSIUM mmol/L 3 4*   CHLORIDE mmol/L 100   CO2 mmol/L 25   BUN mg/dL 55*   CREATININE mg/dL 3 86*   ANION GAP mmol/L 9   CALCIUM mg/dL 8 2*   ALBUMIN g/dL 1 5*   TOTAL BILIRUBIN mg/dL 1 08*   ALK PHOS U/L 206*   ALT U/L <6*   AST U/L 51*   GLUCOSE RANDOM mg/dL 85     Results from last 7 days   Lab Units 10/18/21  1504   INR  1 63*     Results from last 7 days   Lab Units 10/23/21  0757   POC GLUCOSE mg/dl 113         Results from last 7 days   Lab Units 10/23/21  0618 10/22/21  0718   PROCALCITONIN ng/ml 29 16* 36 08*       Lines/Drains:  Invasive Devices Peripherally Inserted Central Catheter Line            PICC Line 10/11/21 13 days          Line            Hemodialysis AV Fistula 09/30/21 Right Upper arm 25 days          Hemodialysis Catheter            HD Permanent Double Catheter 4 days          Drain            Urostomy Ileal conduit RUQ 1846 days    Closed/Suction Drain Left Other (Comment) 11 days    Closed/Suction Drain Left Other (Comment) 10 Fr  11 days                Central Line:  Goal for removal: Will discontinue when meds requiring line are completed               Imaging: Reviewed radiology reports from this admission including: ultrasound(s)    Recent Cultures (last 7 days):         Last 24 Hours Medication List:   Current Facility-Administered Medications   Medication Dose Route Frequency Provider Last Rate    acetaminophen  650 mg Oral Q6H PRN Stuart Green MD      aluminum-magnesium hydroxide-simethicone  30 mL Oral Q6H PRN Stuart Green MD      apixaban  2 5 mg Oral BID Suni Puckett MD      atorvastatin  40 mg Oral HS Stuart Green MD      calcitriol  0 25 mcg Oral Daily Stuart Green MD      cholecalciferol  800 Units Oral Daily Stuart Green MD      citalopram  20 mg Oral Daily Stuart Green MD      docusate sodium  100 mg Oral BID Stuart Green MD      levothyroxine  75 mcg Oral Daily Stuart Green MD      melatonin  3 mg Oral HS Stuart Green MD      metoprolol  2 5 mg Intravenous Q6H PRN Irma Milton PA-C      metoprolol tartrate  25 mg Oral BID Irma Milton PA-C      metroNIDAZOLE  500 mg Oral Q8H [facility] Theodore Greene MD      ondansetron  4 mg Intravenous Q6H PRN Stuart Green MD      pantoprazole  40 mg Oral BID LAURO Laguerre DO      saccharomyces boulardii  250 mg Oral Daily Stuart Green MD      senna  1 tablet Oral Daily Stuart Green MD          Today, Patient Was Seen By: Real Grewal DO    **Please Note: This note may have been constructed using a voice recognition system  **

## 2021-10-25 NOTE — TELEMEDICINE
e-Consult (IPC)  - Interventional Radiology  Justino Tenorio 77 y o  female MRN: [id number]  Unit/Bed#: [facility] 929-01 Encounter: [id number]    IR has been consulted to evaluate the patient, determine the appropriate procedure, and whether or not a procedure can and should be performed regarding the care of Justino Tenorio  We were consulted by SLIM concerning left sided tube, and to possibly perform a tube check if medically appropriate for the patient  IP Consult to IR  Consult performed by: VICTORINA Tatum  Consult ordered by: Karri Castro DO        10/25/21      Assessment/Recommendation:     77year old female well known to IR with left sided flank tube not holding suction  Evaluated bedside with floor nurse, bulb was changed and still not holding suction  Flushed and leaking, and black amado at pigtail catheter suture visible  Low outputs also from both tubes, charted as 0's for the last several days  Will perform tube checks on both tubes  - plan for both left sided tube check       Total time spent in review of data, discussion with requesting provider and rendering advice was 30 minutes  Patient or appropriate family member was verbally informed by SLIM of this consultative service on their behalf to provide more timely access to specialty care in lieu of an in person consultation  Verbal consent was obtained  Thank you for allowing Interventional Radiology to participate in the care of Justino Tenorio  Please don't hesitate to call or TigerText us with any questions       [address] [place] [address] Denae Shea

## 2021-10-25 NOTE — ASSESSMENT & PLAN NOTE
Previously with fever coupled with tachycardia and elevated procalcitonin  Likely secondary to infected left renal hematoma -> Fusobacterium bacteremia noted - fluid culture from 10/13 s/p IR-guided drainage growing Enterobacter/Enterococcus  P o   Flagyl per Infectious Disease, today should be last day  Monitor closely

## 2021-10-25 NOTE — OCCUPATIONAL THERAPY NOTE
Occupational Therapy Progress Note     Patient Name: Joe Benjamin  RVDOA'Q Date: 10/25/2021  Problem List  Principal Problem:    Sepsis on admission  Active Problems:    History of pulmonary embolism    Obstructive uropathy    Essential hypertension    Polycythemia vera ([address] )    Anemia of chronic disease    Acute renal failure superimposed on CKD stage 5    Renal hematoma, left    Pleural effusion    Swelling of right upper extremity    Melena    Hypothyroidism    Thrombocytopenia    Bacteremia          10/25/21 0904   OT Last Visit   OT Visit Date 10/25/21   Note Type   Note Type Treatment   Restrictions/Precautions   Weight Bearing Precautions Per Order No   Other Precautions Chair Alarm; Bed Alarm; Fall Risk;O2;Limb alert   General   Response to Previous Treatment Patient reporting fatigue but able to participate   Lifestyle   Autonomy PTA, pt reports being I with ADLs/IADLs, fnxl mobility, (+)    Reciprocal Relationships Neighbor   Service to Others Retired   Intrinsic Gratification Watching TV   Pain Assessment   Pain Assessment Tool 0-10   Pain Score No Pain   ADL   Where Assessed Edge of bed   Grooming Comments Pt defers washing hair/face at this time   LB Dressing Assistance 2  Maximal Assistance   LB Dressing Deficit Don/doff R sock; Don/doff L sock   Bed Mobility   Supine to Sit 2  Maximal assistance   Additional items Assist x 2; Increased time required;HOB elevated; Bedrails   Additional Comments Pt maintains EOB sitting position with S    Transfers   Sit to Stand 2  Maximal assistance   Additional items Assist x 2; Increased time required;Verbal cues   Stand to Sit 2  Maximal assistance   Additional items Assist x 2; Increased time required   Stand pivot 2  Maximal assistance   Additional items Assist x 2; Increased time required   Additional Comments Transfers with B/L HHA    Cognition   Overall Cognitive Status Impaired   Arousal/Participation Lethargic   Attention Attends with cues to redirect Orientation Level Oriented to person   Memory Unable to assess   Following Commands Follows one step commands with increased time or repetition   Comments Pt requires encouragement to participte, intermittently responsive  Activity Tolerance   Activity Tolerance Patient limited by fatigue   Medical Staff Made Aware PT JOHNATHAN Phipps    Assessment   Assessment Patient participated in Skilled OT session this date with interventions consisting of ADL re training with the use of correct body mechnaics, safety awareness and fall prevention techniques,  therapeutic activities to: increase activity tolerance, increase dynamic sit/ stand balance during functional activity  and increase OOB/ sitting tolerance   Upon arrival patient was found supine in bed  Pt demonstrated the following tasks: MAX A LBD, MAX A sup to sit, MAX A STS + SPT  Pt deferring further ADLs at this time, despite encouragement  Patient continues to be functioning below baseline level, occupational performance remains limited secondary to factors listed above and increased risk for falls and injury  From OT standpoint, recommendation at time of d/c would be STR  Patient to benefit from continued Occupational Therapy treatment while in the hospital to address deficits as defined above and maximize level of functional independence with ADLs and functional mobility  Pt was left in chair with alarm on after session with all current needs met  Extend pt POC x 14 days  She new goals listed below  The patient's raw score on the AM-PAC Daily Activity inpatient short form is 13, standardized score is 32 03, less than 39 4  Patients at this level are likely to benefit from discharge to post-acute rehabilitation services  Please refer to the recommendation of the Occupational Therapist for safe discharge planning  Plan   Treatment Interventions ADL retraining;Functional transfer training;UE strengthening/ROM; Endurance training;Cognitive reorientation;Patient/family training;Equipment evaluation/education; Compensatory technique education;Continued evaluation; Energy conservation; Activityengagement   Goal Expiration Date 11/08/21  (extend POC x 14 days)   OT Treatment Day 3   OT Frequency 2-3x/wk   Recommendation   OT Discharge Recommendation Post acute rehabilitation services   OT - OK to Discharge Yes  (when medically stable )   AM-PAC Daily Activity Inpatient   Lower Body Dressing 2   Bathing 2   Toileting 2   Upper Body Dressing 2   Grooming 2   Eating 3   Daily Activity Raw Score 13   Daily Activity Standardized Score (Calc for Raw Score >=11) 32 03   AM-PAC Applied Cognition Inpatient   Following a Speech/Presentation 3   Understanding Ordinary Conversation 3   Taking Medications 3   Remembering Where Things Are Placed or Put Away 2   Remembering List of 4-5 Errands 2   Taking Care of Complicated Tasks 2   Applied Cognition Raw Score 15   Applied Cognition Standardized Score 33 54      GOALS    - Pt will complete UB dressing/self care/bathing w/ S using adaptive device and DME as needed    - Pt will complete LB dressing/self care/bathing w/ min A using adaptive device and DME as needed    - Pt will complete toileting w/ mod A w/ G hygiene/thoroughness using DME as needed    - Pt will improve functional transfers to min A on/off all surfaces using DME as needed w/ G balance/safety     - Pt will improve functional mobility during ADL/IADL/leisure tasks to min A using DME as needed w/ G balance/safety     - Pt will demonstrate G carryover of pt/caregiver education and training as appropriate      - Pt will demonstrate 100% carryover of energy conservation techniques t/o functional I/ADL/leisure tasks w/o cues s/p skilled education    - Pt will engage in ongoing cognitive assessment w/ G participation to assist w/ safe d/c planning/recommendations    - Pt will increase static and dynamic standing/sitting balance to F using AD/DME as needed to increase safety and I during fnxl transfers and ADLs    - Pt will maintain upright sitting position for at least 20 min during dynamic fnxl activity with G balance and endurance to improve ADL performance and independence, as well as reduce risk of falls       Jose Joiner MS, OTR/L

## 2021-10-25 NOTE — PROGRESS NOTES
Progress Note - Infectious Disease   Abilio Faria 77 y o  female MRN: [id number]  Unit/Bed#: [facility] 929-01 Encounter: [id number]      Impression/Plan:  1  Sepsis, POA   Patient presented with progressing flank/abdominal discomfort likely related to problem 3  Clinical parameters had improved  Overall poor progress likely due to 4  Complete antibiotics as below  Continue to trend fever curve/WBC  Ongoing follow-up by Nephrology  Additional supportive care as per primary     2  Fusobacterium bacteremia   Source unknown, possibly transient given 1 or occult GI source  Atypical pathogen to be isolated and cause urinary tract infection   CT imaging of the abdomen on admission unremarkable  Repeat imaging with new collections noted in the abdomen and partial SBO, potential source   Drains placed   Patient without any teeth   Repeat blood cultures without growth   Not isolated on fluid cultures  No prior C-scope on chart review  EGD unremarkable  Transaminitis as below  Imaging of the right upper quadrant ultrasound unremarkable  Complete course of Flagyl today  Last doses of antibiotics ordered  Continue to trend fever curve/WBC  Ongoing follow-up by GI  Consider C scope once more stable/outpatient  Supportive care as per primary     3  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   Patient has a chronic obstructive uropathy involving the left side and recently had PCN removal   Subsequently developed bleeding at the site and hematoma on imaging likely due to chronic anticoagulation   IR aspiration and drain placement on 10/13 and earlier urine cultures with same organisms   Drain providing source control  No further antibiotics for this issue  Ongoing follow-up by IR  Supportive care as per primary     4  LUANN on CKD with fistula  Patient with baseline chronic kidney disease with fistula created in the right upper extremity    Stenosis causing swelling, improving   Kidney function worsening over entire admission   Will attempt to avoid certain antibiotics  Some subjective improvement with dialysis initiation  Repeat BMP  Ongoing follow-up by Nephrology  Vascular surgery follow-up/imaging     5  History of C diff and ongoing diarrhea   Patient had a history of C diff in   She has had repeat PCRs in 2021 and now negative despite having diarrhea   Likely related to 9  Continue systemic antibiotic as above  Monitor stool output  No C diff prophylaxis     6  Polycythemia vera and MDS   Ongoing management/supportive care as per primary   Recommend follow-up with Oncology as outpatient      7  Prior PE on anticoagulation   Likely risk factor for issues as above  AC as per primary        8  Transaminitis   Patient noted to have slowly increasing alkaline phosphatase and AST   In the setting of this bacteremia consider occult abscess  Now downtrending/stable  Liver/RUQ ultrasound unremarkable  Monitor LFTs  Complete antibiotics as above  GI evaluation noted     9  Melena   Possible upper GI bleed   Ongoing follow-up and workup as per GI   EGD reportedly unremarkable  Additional care as per primary  Above plan discussed briefly with the patient at bedside  ID consult service will continue to follow  Antibiotics:  Flagyl ends today     Subjective:  Patient sitting up in chair  She reports having ongoing although her fatigue  Denies having any localizing symptoms including nausea, vomiting, chest pain or shortness of breath  Denies having any abdominal pain  Overall just states feeling unwell      Objective:  Vitals:  Temp:  [98 2 °F (36 8 °C)-98 8 °F (37 1 °C)] 98 8 °F (37 1 °C)  HR:  [75-84] 84  Resp:  [16-18] 18  BP: (101-122)/(60-70) 108/62  SpO2:  [96 %-98 %] 96 %  Temp (24hrs), Av 5 °F (36 9 °C), Min:98 2 °F (36 8 °C), Max:98 8 °F (37 1 °C)  Current: Temperature: 98 8 °F (37 1 °C)    Physical Exam:   General Appearance:  Awake, interactive, chronically ill-appearing, frail, fatigued, severely depressed affect  Throat: Oropharynx moist without lesions  Lungs:   Clear to auscultation bilaterally; no wheezes, rhonchi or rales; respirations unlabored on room air   Heart:  RRR; no murmur, rub or gallop approved   Abdomen:   Soft, non-tender, non-distended, positive bowel sounds  Extremities: No clubbing, cyanosis; pitting edema in all of her extremities   Skin: No new rashes or lesions  No new draining wounds noted  Previously seen ecchymoses on the left upper extremity improving         Labs, Imaging, & Other studies:   All pertinent labs and imaging studies were personally reviewed  Results from last 7 days   Lab Units 10/22/21  0717 10/21/21  0932 10/20/21  0600   WBC Thousand/uL 10 11 10 98* 11 25*   HEMOGLOBIN g/dL 7 5* 8 2* 8 5*   PLATELETS Thousands/uL 79* 96* 127*     Results from last 7 days   Lab Units 10/22/21  0718 10/21/21  0932 10/20/21  0600   POTASSIUM mmol/L 3 4* 3 6 4 2   CHLORIDE mmol/L 100 104 103   CO2 mmol/L 25 21 20*   BUN mg/dL 55* 92* 128*   CREATININE mg/dL 3 86* 5 29* 6 78*   EGFR ml/min/1 73sq m 11 7 5   CALCIUM mg/dL 8 2* 7 9* 8 3   AST U/L 51* 50* 39   ALT U/L <6* <6* <6*   ALK PHOS U/L 206* 208* 210*

## 2021-10-25 NOTE — PLAN OF CARE
Problem: OCCUPATIONAL THERAPY ADULT  Goal: Performs self-care activities at highest level of function for planned discharge setting  See evaluation for individualized goals  Description: Treatment Interventions: ADL retraining, Functional transfer training, UE strengthening/ROM, Endurance training, Patient/family training, Equipment evaluation/education, Compensatory technique education, Continued evaluation, Activityengagement, Energy conservation          See flowsheet documentation for full assessment, interventions and recommendations  Note: Limitation: Decreased ADL status, Decreased UE ROM, Decreased UE strength, Decreased endurance, Decreased self-care trans, Decreased high-level ADLs  Prognosis: Fair  Assessment: Patient participated in Skilled OT session this date with interventions consisting of ADL re training with the use of correct body mechnaics, safety awareness and fall prevention techniques,  therapeutic activities to: increase activity tolerance, increase dynamic sit/ stand balance during functional activity  and increase OOB/ sitting tolerance   Upon arrival patient was found supine in bed  Pt demonstrated the following tasks: MAX A LBD, MAX A sup to sit, MAX A STS + SPT  Pt deferring further ADLs at this time, despite encouragement  Patient continues to be functioning below baseline level, occupational performance remains limited secondary to factors listed above and increased risk for falls and injury  From OT standpoint, recommendation at time of d/c would be STR  Patient to benefit from continued Occupational Therapy treatment while in the hospital to address deficits as defined above and maximize level of functional independence with ADLs and functional mobility  Pt was left in chair with alarm on after session with all current needs met  Extend pt POC x 14 days  She new goals listed below   The patient's raw score on the AM-PAC Daily Activity inpatient short form is 13, standardized score is 32 03, less than 39 4  Patients at this level are likely to benefit from discharge to post-acute rehabilitation services  Please refer to the recommendation of the Occupational Therapist for safe discharge planning       OT Discharge Recommendation: Post acute rehabilitation services  OT - OK to Discharge: Yes (when medically stable )

## 2021-10-25 NOTE — PROGRESS NOTES
NEPHROLOGY PROGRESS NOTE   Wilfredo Sawant 77 y o  female MRN: [id number]  Unit/Bed#: [facility] 929-01 Encounter: [id number]  Reason for Consult: Management of ESRD     ASSESSMENT AND PLAN:  76 yo woman PMH of Hx of obstructive uropathy, bilateral hydronephrosis  status post nephrostomy tube placement 05/11/2021 and status postleft percutaneous/retrograde percutaneous nephrostomy placement on 09/20/2021, ileal conduit 2016, polycythemia vera, HTN, CKD5 p/w abdominal pain was found to have hematoma of the left kidney complicated with sepsis with worsening kidney function  Paten started HD on 10/20/21 via PC    # LUANN on CKD G5 on HD new start the via PC  · Dialysis unit/days:  New start 10/20/21  · Access:  PermCath has right AV fistula s/p been transposition on 9/30  · Plan for next HD 10 Tuesday 10/26  · Renal Diet  · Fluid restriction 1-1 5L/d  · Adjust medications to GFR<10  · Avoid opioids   · Will need HD unit on discharge    #Volume status/hypertension:  · Volume:  Euvolemic  · Blood pressure:  104/60 normotensive  · On metoprolol 25 mg b i d  # Hypokalemia  · Serum potassium 3 4 mEq/L 10/22  · Can do BMP on dialysis tomorrow    #Secondary Hyperparasitoidism   · iPTH 167 4  levels   · Low vit D as  of PTH  · On calcitriol 0 25 mcg  · Will check phosphorus tomorrow on HD    #Hypovitaminosis D  · 250  OH 15 2 ng/mL  · On vitamin-D 100 units    # multifactorial Anemia   · Secondary to kidney disease and GI bleed  · Current hemoglobin:  7 5 mg/dL  · Treatment:  · No indication of JENELLE due to history of PE   · Transfuse for hemoglobin less than 7 0 per primary service        #vascular access  · Currently PermCath  · Right AV fistula s/p vein transposition 9/30      # sepsis  · Infected left renal hematoma  · On metronidazole 500 mg t i d       SUBJECTIVE:  Patient seen and examined at bedside  Patient feels weak denies shortness or breath    Last dialysis  on Saturday well tolerated at [facility] 1  7L     OBJECTIVE:  Current Weight: Weight - Scale: 86 2 kg (190 lb)  Vitals:    10/24/21 2216   BP: 104/60   Pulse: 75   Resp: 17   Temp: 98 5 °F (36 9 °C)   SpO2: 98%       Intake/Output Summary (Last 24 hours) at 10/25/2021 1644  Last data filed at 10/25/2021 0300  Gross per 24 hour   Intake 120 ml   Output 50 ml   Net 70 ml     Wt Readings from Last 3 Encounters:   10/15/21 86 2 kg (190 lb)   09/30/21 83 9 kg (185 lb)   09/22/21 84 7 kg (186 lb 12 8 oz)     Temp Readings from Last 3 Encounters:   10/24/21 98 5 °F (36 9 °C)   10/05/21 98 4 °F (36 9 °C) (Oral)   09/29/21 (!) 97 °F (36 1 °C) (Temporal)     BP Readings from Last 3 Encounters:   10/24/21 104/60   10/07/21 139/76   10/05/21 131/66     Pulse Readings from Last 3 Encounters:   10/24/21 75   10/07/21 76   10/05/21 80        General:  Elderly patient,  no acute distress at this time  Skin:  No acute rash  Eyes:  No scleral icterus and noninjected  ENT:  Normocephalic, atraumatic, mucous membranes moist  Neck:  Supple, no jugular venous distention L IJ PC bruise no signs of inflammation  Back:  No CVA tenderness  Chest:  Clear to auscultation percussion, good respiratory effort, no use of accessory respiratory muscles  CVS:  Regular rate and rhythm without a murmur rub   Abdomen:  Obese, soft and nontender normal bowel sounds  Extremities:  No LE edema   Neuro:  No gross focality  Psych:  cooperative    Medications:    Current Facility-Administered Medications:     acetaminophen (TYLENOL) tablet 650 mg, 650 mg, Oral, Q6H PRN, Viola Ramos MD, 650 mg at 10/21/21 1215    aluminum-magnesium hydroxide-simethicone (MYLANTA) oral suspension 30 mL, 30 mL, Oral, Q6H PRN, Viola Ramos MD    apixaban (ELIQUIS) tablet 2 5 mg, 2 5 mg, Oral, BID, Vicky Joy MD, 2 5 mg at 10/24/21 1220    atorvastatin (LIPITOR) tablet 40 mg, 40 mg, Oral, HS, Viola Ramos MD, 40 mg at 10/24/21 2314    calcitriol (ROCALTROL) capsule 0 25 mcg, 0 25 mcg, Oral, Daily, Erick Sims MD, 0 25 mcg at 10/24/21 1222    cholecalciferol (VITAMIN D) oral liquid 800 Units, 800 Units, Oral, Daily, Erick Sims MD, 800 Units at 10/24/21 1221    citalopram (CeleXA) tablet 20 mg, 20 mg, Oral, Daily, Erick Sims MD, 20 mg at 10/24/21 1220    docusate sodium (COLACE) capsule 100 mg, 100 mg, Oral, BID, Erick Sims MD, 100 mg at 10/17/21 1140    levothyroxine tablet 75 mcg, 75 mcg, Oral, Daily, Erick Sims MD, 75 mcg at 10/24/21 1219    melatonin tablet 3 mg, 3 mg, Oral, HS, Erick Sims MD, 3 mg at 10/24/21 2314    metoprolol (LOPRESSOR) injection 2 5 mg, 2 5 mg, Intravenous, Q6H PRN, Isidro Samuels PA-C, 2 5 mg at 10/20/21 1618    metoprolol tartrate (LOPRESSOR) tablet 25 mg, 25 mg, Oral, BID, Madeline Funes PA-C, 25 mg at 10/24/21 1225    metroNIDAZOLE (FLAGYL) tablet 500 mg, 500 mg, Oral, Q8H [address], Jose C Plata MD, 500 mg at 10/25/21 1134    ondansetron (ZOFRAN) injection 4 mg, 4 mg, Intravenous, Q6H PRN, Erick Sims MD, 4 mg at 10/14/21 2153    pantoprazole (PROTONIX) EC tablet 40 mg, 40 mg, Oral, BID AC, Mumtaz Laguerre DO, 40 mg at 10/25/21 9102    saccharomyces boulardii (FLORASTOR) capsule 250 mg, 250 mg, Oral, Daily, Erick Sims MD, 250 mg at 10/22/21 0909    senna (SENOKOT) tablet 8 6 mg, 1 tablet, Oral, Daily, Erick Sims MD, 8 6 mg at 10/17/21 1140    Laboratory Results:  Results from last 7 days   Lab Units 10/22/21  0718 10/22/21  0717 10/21/21  0932 10/20/21  0600 10/19/21  0600 10/18/21  1504   WBC Thousand/uL  --  10 11 10 98* 11 25* 9 27 9 86   HEMOGLOBIN g/dL  --  7 5* 8 2* 8 5* 9 0* 8 8*   HEMATOCRIT %  --  24 1* 26 8* 27 9* 29 0* 28 8*   PLATELETS Thousands/uL  --  79* 96* 127* 122* 123*   SODIUM mmol/L 134*  --  137 138 137  --    POTASSIUM mmol/L 3 4*  --  3 6 4 2 4 2  --    CHLORIDE mmol/L 100  --  104 103 105  --    CO2 mmol/L 25  --  21 20* 19*  --    BUN mg/dL 55*  --  92* 128* 129*  --    CREATININE mg/dL 3 86*  --  5 29* 6 78* 6 40*  --    CALCIUM mg/dL 8 2*  --  7 9* 8 3 8 6  --        US right upper quadrant   Final Result by Johnnie Jiménez MD (10/22 1754)      Nonobstructive right renal calculi  Otherwise unremarkable sonographic study  Please note CT of 10/13/2021 demonstrated a left kidney subcapsular collection and left abdominal and right lower quadrant collection  Workstation performed: [id number]         IR tunneled dialysis catheter placement   Final Result by Sylvie Knutson MD (10/21 1118)   Impression:      Successful placement of a left internal jugular vein 32 cm tunneled dialysis catheter  Workstation performed: [id number]         US kidney and bladder   Final Result by Maddie Potts MD (10/20 1387)      1  Mild right upper pole caliectasis, difficult to compare to prior CT, but likely present to some degree on that study  Multiple nonobstructing right renal calculi  2   Difficult visualization of the left kidney due to body habitus  No gross hydronephrosis  Partially visualized left perinephric drainage catheter with likely some residual perinephric collection, noting poor evaluation  3   Unchanged 7 6 cm simple right lower quadrant fluid collection and fluid collection versus free fluid in the cul-de-sac, as seen on prior CT  Workstation performed: [id number]         VAS hemodialysis access duplex right upper limb avf   Final Result by Arvind Damon MD (10/20 2209)      XR chest portable   Final Result by Sylvie Knutson MD (10/18 9099)      Persistent left pleural disease                    Workstation performed: [id number]         IR drainage tube placement   Final Result by Al Caldera MD (10/13 2041)   Impression:       Image guided 10 Somali drain placement into a left flank/paracolic gutter collection       Image guided 10 Somali drain placement into a left perinephric collection         Workstation performed: [id number] CT chest abdomen pelvis wo contrast   Final Result by Jesus Zapata MD (10/13 6973)      There is a increasing dilation of the small bowel loops in the upper to mid abdomen with the zone of transition suggest partial small bowel obstruction       New loculated fluid collection in the left paracolic gutter measuring 6 8 x 3 6 x 12 6 cm      Additional loculated fluid collection within the pelvic cul-de-sac, stable   Previously noted the postoperative right iliac fossa collection likely related to suggest lymphocele/seroma, stable      Retrograde the left nephroureteral stent with resolution of the left hydronephrosis      Subcapsular fluid collection left kidney, moderate on previous study      No new collection with the air-fluid level      No airspace consolidations lungs   Area of groundglass density seen in the posterior aspect of the left upper lobe may be due to atelectasis/pneumonitis       I personally discussed this study with Pilar Guzman on 10/13/2021 at 2:29 PM                Workstation performed: [id number]         IR IN-Patient Thoracentesis   Final Result by Kristi Solares MD (10/12 2214)   Impression:      Ultrasound-guided left thoracentesis      Small effusion which was difficult to aspirate  Workstation performed: [id number]         IR non-tunneled central line placement   Final Result by Kristi Solares MD (10/12 2216)   Impression:       Image guided placement of a nontunneled 5 Malagasy double-lumen power injectable central venous catheter via the right external jugular vein  Workstation performed: [id number]         VAS hemodialysis access duplex right upper limb avf   Final Result by Erick Perdomo MD (10/11 1031)      CT abdomen pelvis wo contrast   Final Result by Geetha Diana MD (10/09 1625)      In the left renal fossa, there is a collection of mixed intermediate and high attenuation suggesting a hematoma with some bubbles of gas  Pigtail catheter is noted medial to the kidney  The renal pelvis may be collapsed  There appears to be some    hemorrhage and thickening extending in the combined interfascial plane of the retroperitoneum as well as some high density fluid within the pelvis suggesting hemoperitoneum  Superinfection of the kidney is possible  Urology evaluation is advised  Nonobstructing calculi in the right kidney  Some prominent loops of bowel are more likely large bowel suggesting ileus  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  This was discussed with Dr Belkys Allen at 4:20 PM               Workstation performed: [id number]         XR chest portable   Final Result by Joan Schofield MD (10/10 1083)      Medial left base slight atelectasis and or small infiltrate  Small left effusion      The study was marked in EPIC for immediate notification  Workstation performed: [id number]         IR AV fistulagram/graftogram    (Results Pending)   IR drainage tube check/change/reposition/reinsertion/upsize    (Results Pending)       Portions of the record may have been created with voice recognition software  Occasional wrong word or "sound a like" substitutions may have occurred due to the inherent limitations of voice recognition software  Read the chart carefully and recognize, using context, where substitutions have occurred

## 2021-10-25 NOTE — ASSESSMENT & PLAN NOTE
Hx of Polycythemia vera and MDS  Significant anemia secondary to blood loss in the past    The patient is currently off of the [facility] treatment for her PV and getting mainly Aranesp on every 28 day basis    Hematology consulted for evaluation, recommend once patient is better/ stable, discharge, she will follow-up with Dr Zaire Dacosta, her primary hematologist

## 2021-10-25 NOTE — PHYSICAL THERAPY NOTE
PT TREATMENT       10/25/21 0905   PT Last Visit   PT Visit Date 10/25/21   Note Type   Note Type Treatment   Pain Assessment   Pain Assessment Tool 0-10   Pain Score No Pain   Restrictions/Precautions   Other Precautions Fall Risk;Limb alert  (R UE LIMB ALERT)   General   Chart Reviewed Yes   Response to Previous Treatment Patient with no complaints from previous session  Family/Caregiver Present No   Cognition   Arousal/Participation Lethargic   Attention Attends with cues to redirect   Orientation Level Unable to assess;Oriented to person  (MINIMALLY COMMUNICATIVE- REPORTS FATIGUE )   Memory Unable to assess   Following Commands Follows one step commands with increased time or repetition   Comments PATIENT INITIALLY NOT AGREEABLE TO PARTICIPATION  REQUIRED EMOTIONAL SUPPORT AND ENCOURGMENT  THEN WAS PLEASANT AND AGREEABLE HOWEVER OVERALL LETHARGIC    Subjective   Subjective "IM SO TIRED"   Bed Mobility   Supine to Sit 2  Maximal assistance   Additional items Assist x 2; Increased time required;LE management   Additional Comments MAINTAINED STATIC SITTING EOB S LEVEL X 8 MINUTES  Transfers   Sit to Stand 2  Maximal assistance   Additional items Assist x 2; Increased time required;Verbal cues   Stand to Sit 2  Maximal assistance   Additional items Assist x 2; Increased time required;Verbal cues   Stand pivot 2  Maximal assistance   Additional items Assist x 2; Increased time required;Verbal cues  (B/L HHA)   Additional Comments ONCE IN CHAIR, POSITIONED CHAIR FLAT TO BOOST PATIENT BACK INTO CHAIR  SPOKE WITH RN TO RECOMMEND SLIDE BACK TO BED    Ambulation/Elevation   Gait pattern Excessively slow; Short stride; Shuffling;Decreased foot clearance   Gait Assistance 2  Maximal assist   Additional items Assist x 2   Assistive Device Other (Comment)  (HHA)   Distance 1 FOOT (INITIATED SMALL STEPS BED TO CHAIR)    Balance   Static Sitting Fair   Static Standing Poor -   Ambulatory Poor -   Endurance Deficit   Endurance Deficit Yes   Endurance Deficit Description R UE EDEMA, GROSS WEAKNESS, FATIGUE, 2 L O2    Activity Tolerance   Activity Tolerance Patient limited by fatigue   Medical Staff Made Aware CO-TREATMENT WITH OCCUPATIONAL THERAPY WAS PERFORMED TODAY  THIS PATIENT'S PARTICIPATION IN THERAPY SERVICES WAS LIMITED BY DECREASED TOLERANCE FOR ACTIVITY AND CURRENT MEDICAL STATUS  FOR THESE REASONS, CO-TREATMENT WAS PERFORMED SO THAT PATIENT COULD OPTIMALLY PARTICIPATE IN THERAPY SERVICES AND MAXIMIZE THEIR REHABILITATION DURING TREATMENT TIME  PT AND OT DISCIPLINES ADDRESSED SEPARATE GOALS OF PATIENT'S INDIVIDUALIZED CARE PLAN  Nurse Made Aware BRODERICK TO SEE PER RN RADHAMES AND F/U POST    Assessment   Prognosis Fair   Problem List Decreased strength;Decreased endurance; Impaired balance;Decreased mobility; Decreased cognition;Decreased skin integrity;Obesity   Assessment PT INITIATED TREATMENT SESSION IN ORDER TO ASSIST PATIENT IN ACHIEVING GOALS TO IMPROVE TRANSFERS, SITTING BALANCE, AND OVERALL ACTIVITY TOLERANCE  PATIENT OVERALL LETHARGIC BUT COOPERATIVE  PATIENT TRANSFERRED OOB FOR 1ST TIME, PERFORMING STAND PIVOT TRANSFER WITH MAX-AX2 AND HAND HELD ASSISTANCE  SITTING EOB PRIOR TO TRANSFER PATIENT ABLE TO TOLERATE 8 MINUTES S LEVEL  THROUGHOUT TREATMENT SESSION PATIENT REQUIRED BOTH VERBAL AND TACTILE CUING TO IMPROVE SAFETY, EFFICIENCY, AND MECHANICS OF MOBILITY IN ADDITION TO HANDS ON ASSISTANCE FOR ALL ASPECTS OF FUNCTIONAL MOBILITY  ADDITIONALLY, SHE REQUIRED INCREASED TIME TO EXECUTE SPECIFIC MOBILITY TASKS WITH REST BREAKS IN BETWEEN SECONDARY TO GROSS FATIGUE AND WEAKNESS  PT D/C RECOMMENDATION REMAINS FOR REHAB  PATIENT WILL BENEFIT FROM CONTINUED SKILLED PT THIS ADMISSION TO ACHIEVE MAXIMAL FUNCTION AND SAFETY     Goals   Patient Goals AGREEABLE TO SITTING OOB IN CHAIR    STG Expiration Date 11/15/21  (NEW GOALS ESTABLISHED TODAY)   Short Term Goal #1 1) PERFORM BED MOBILITY MIN-AX1TO PARTICIPATE IN FREQUENT REPOSITIONING AND IMPROVE SKIN INTEGRITY; 2) PERFORM SIT<-->STAND AND STAND PIVOT TRANSFER MIN-AX1 TO PROMOTE I WITH TOILETING AND OOB MOBILITY; 3) AMBULATE 20 FEET MIN-AX1 W/ RW TO PARTICIPATE IN HOUSEHOLD AND COMMUNITY LEVEL AMBULATION; 4) IMPROVE B/L LE STRENGTH BY 1/2 GRADE TO IMPROVE EFFICIENCY OF TRANSFERS; 5) IMPROVE BALANCE BY 1 GRADE TO REDUCE RISK FOR FALLS; 6 )IMPROVE SITTING BALANCE AND TOLERANCE EOB X 25 MINUTES, GOOD IN ORDER TO IMPROVE CORE STABILITY AND OVERALL ACTIVITY TOLERANCE  PT Treatment Day 4   Plan   Treatment/Interventions Functional transfer training;LE strengthening/ROM; Therapeutic exercise; Endurance training;Patient/family training;Equipment eval/education; Bed mobility;Gait training;OT;Spoke to nursing   Progress Slow progress, decreased activity tolerance   PT Frequency Other (Comment)  (3-5X/WK)   Recommendation   PT Discharge Recommendation Post acute rehabilitation services   Equipment Recommended Wheelchair   PT - OK to Discharge Yes  (TO REHAB WHEN MED BRODERICK )   [facility] [address] Mobility Inpatient   Turning in Bed Without Bedrails 2   Lying on Back to Sitting on Edge of Flat Bed 1   Moving Bed to Chair 1   Standing Up From Chair 1   Walk in Room 1   Climb 3-5 Stairs 1   Basic Mobility Inpatient Raw Score 7   Turning Head Towards Sound 3   Follow Simple Instructions 3   Low Function Basic Mobility Raw Score 13   Low Function Basic Mobility Standardized Score 20 14     The patient's AM-PAC Basic Mobility Inpatient Standardized Score is less than 42 9, suggesting this patient may benefit from discharge to post-acute rehabilitation services  Please also refer to the recommendation of the Physical Therapist for safe discharge planning      NILAM PABON PT, DPT

## 2021-10-25 NOTE — ASSESSMENT & PLAN NOTE
Lab Results   Component Value Date    EGFR 11 10/22/2021    EGFR 7 10/21/2021    EGFR 5 10/20/2021    CREATININE 3 86 (H) 10/22/2021    CREATININE 5 29 (H) 10/21/2021    CREATININE 6 78 (H) 10/20/2021

## 2021-10-26 ENCOUNTER — APPOINTMENT (INPATIENT)
Dept: DIALYSIS | Facility: HOSPITAL | Age: 76
DRG: 981 | End: 2021-10-26
Payer: COMMERCIAL

## 2021-10-26 LAB
ANION GAP SERPL CALCULATED.3IONS-SCNC: 7 MMOL/L (ref 4–13)
BUN SERPL-MCNC: 36 MG/DL (ref 5–25)
CALCIUM SERPL-MCNC: 8.3 MG/DL (ref 8.3–10.1)
CHLORIDE SERPL-SCNC: 101 MMOL/L (ref 100–108)
CO2 SERPL-SCNC: 26 MMOL/L (ref 21–32)
CREAT SERPL-MCNC: 4.77 MG/DL (ref 0.6–1.3)
GFR SERPL CREATININE-BSD FRML MDRD: 8 ML/MIN/1.73SQ M
GLUCOSE SERPL-MCNC: 106 MG/DL (ref 65–140)
PHOSPHATE SERPL-MCNC: 5.9 MG/DL (ref 2.3–4.1)
POTASSIUM SERPL-SCNC: 3.9 MMOL/L (ref 3.5–5.3)
SODIUM SERPL-SCNC: 134 MMOL/L (ref 136–145)

## 2021-10-26 PROCEDURE — 80048 BASIC METABOLIC PNL TOTAL CA: CPT | Performed by: STUDENT IN AN ORGANIZED HEALTH CARE EDUCATION/TRAINING PROGRAM

## 2021-10-26 PROCEDURE — 99232 SBSQ HOSP IP/OBS MODERATE 35: CPT | Performed by: INTERNAL MEDICINE

## 2021-10-26 PROCEDURE — 99232 SBSQ HOSP IP/OBS MODERATE 35: CPT | Performed by: STUDENT IN AN ORGANIZED HEALTH CARE EDUCATION/TRAINING PROGRAM

## 2021-10-26 PROCEDURE — 84100 ASSAY OF PHOSPHORUS: CPT | Performed by: STUDENT IN AN ORGANIZED HEALTH CARE EDUCATION/TRAINING PROGRAM

## 2021-10-26 RX ADMIN — PANTOPRAZOLE SODIUM 40 MG: 40 TABLET, DELAYED RELEASE ORAL at 17:45

## 2021-10-26 RX ADMIN — MELATONIN TAB 3 MG 3 MG: 3 TAB at 20:40

## 2021-10-26 RX ADMIN — LEVOTHYROXINE SODIUM 75 MCG: 75 TABLET ORAL at 13:24

## 2021-10-26 RX ADMIN — CALCITRIOL 0.25 MCG: 0.25 CAPSULE, LIQUID FILLED ORAL at 13:23

## 2021-10-26 RX ADMIN — APIXABAN 2.5 MG: 2.5 TABLET, FILM COATED ORAL at 13:23

## 2021-10-26 RX ADMIN — CITALOPRAM HYDROBROMIDE 20 MG: 20 TABLET ORAL at 13:23

## 2021-10-26 RX ADMIN — METOPROLOL TARTRATE 25 MG: 25 TABLET, FILM COATED ORAL at 13:23

## 2021-10-26 RX ADMIN — PANTOPRAZOLE SODIUM 40 MG: 40 TABLET, DELAYED RELEASE ORAL at 06:04

## 2021-10-26 RX ADMIN — ATORVASTATIN CALCIUM 40 MG: 40 TABLET, FILM COATED ORAL at 20:40

## 2021-10-26 RX ADMIN — METOPROLOL TARTRATE 25 MG: 25 TABLET, FILM COATED ORAL at 17:45

## 2021-10-26 RX ADMIN — APIXABAN 2.5 MG: 2.5 TABLET, FILM COATED ORAL at 17:45

## 2021-10-26 RX ADMIN — DOCUSATE SODIUM 100 MG: 100 CAPSULE, LIQUID FILLED ORAL at 17:45

## 2021-10-26 NOTE — PROGRESS NOTES
NEPHROLOGY PROGRESS NOTE   Lauren Mcarthur 77 y o  female MRN: [id number]  [facility] Encounter: [id number]  Reason for Consult:  Management of ESRD    ASSESSMENT AND PLAN:  76 yo woman PMH of Hx of obstructive uropathy, bilateral hydronephrosis  status post nephrostomy tube placement 05/11/2021 and status postleft percutaneous/retrograde percutaneous nephrostomy placement on 09/20/2021, ileal conduit 2016, polycythemia vera, HTN, CKD5 p/w abdominal pain was found to have hematoma of the left kidney complicated with sepsis with worsening kidney function   Tomás started HD on 10/20/21 via PC    PLAN:    #  Oliguric LUANN and CKD G5 on HD TTS  · Dialysis unit/days:  New start on 10/20/21  · Access:   right IJ PermCath, has a left AVF s/p been transposition on 9/30  · Plan to continue HD TTS  · Renal Diet  · Last HD today,  mL  · Fluid restriction 1-1 5L/d  · Adjust medications to GFR<10  · Avoid opioids   · Will need HD unit on discharge  · Daily weight     #Volume status/hypertension:  · Volume:  Euvolemic  · Blood pressure:  Normotensive this morning, 107/79  · On metoprolol 25 mg b i d      # Hypokalemia  · Serum potassium 3 4 mEq/L 10/22  · Can do BMP on dialysis tomorrow     #Secondary Hyperparasitoidism   · iPTH 167 4  levels   · Hypovitaminosis D as  of PTH  · Continue calcitriol 0 25 mcg  · Low phosphorus diet     #Hypovitaminosis D  · 250  OH 15 2 ng/mL  · On vitamin-D 800 units     # multifactorial Anemia   · Secondary to kidney disease and GI bleed  · Current hemoglobin:  7 5 mg/dL 10/22  · Treatment:  · No indication of JENELLE at this time due to history of PE  · Transfuse for hemoglobin less than 7 0 per primary service          #vascular access  · Currently PermCath  · Right AV fistula s/p vein transposition 9/30 not get ready to be used        # sepsis  · Infected left renal hematoma  · S/p metronidazole 500 mg t i d   · Id following    SUBJECTIVE:  Patient seen and examined at bedside this morning, afebrile overnight  She denies shortness of breath no chest pain    Urinary output 100 mL in the last 2 hours    OBJECTIVE:  Current Weight: Weight - Scale: 86 2 kg (190 lb)  Vitals:    10/26/21 1225   BP: 122/50   Pulse: 78   Resp: 16   Temp: 97 5 °F (36 4 °C)   SpO2:        Intake/Output Summary (Last 24 hours) at 10/26/2021 1442  Last data filed at 10/26/2021 1225  Gross per 24 hour   Intake 980 ml   Output 1100 ml   Net -120 ml     Wt Readings from Last 3 Encounters:   10/15/21 86 2 kg (190 lb)   09/30/21 83 9 kg (185 lb)   09/22/21 84 7 kg (186 lb 12 8 oz)     Temp Readings from Last 3 Encounters:   10/26/21 97 5 °F (36 4 °C) (Oral)   10/05/21 98 4 °F (36 9 °C) (Oral)   09/29/21 (!) 97 °F (36 1 °C) (Temporal)     BP Readings from Last 3 Encounters:   10/26/21 122/50   10/07/21 139/76   10/05/21 131/66     Pulse Readings from Last 3 Encounters:   10/26/21 78   10/07/21 76   10/05/21 80        General:  no acute distress at this time  Skin:  No acute rash  Eyes:  No scleral icterus and noninjected  ENT:  mucous membranes moist  Neck:  no jugular venous distention, no carotid bruits, 1+ carotid upstroke  Chest:  Clear to auscultation percussion, good respiratory effort, no use of accessory respiratory muscles  CVS:  Regular rate and rhythm without a murmur rub or gallops appreciable  Abdomen:  soft and nontender normal bowel sounds   Extremities:  No LE edema  Neuro:  No gross focality  Psych:  Cooperative  Vascular access:  Left IJ PermCath    Medications:    Current Facility-Administered Medications:     acetaminophen (TYLENOL) tablet 650 mg, 650 mg, Oral, Q6H PRN, Finesse Jaramillo MD, 650 mg at 10/21/21 1215    aluminum-magnesium hydroxide-simethicone (MYLANTA) oral suspension 30 mL, 30 mL, Oral, Q6H PRN, Finesse Jaramillo MD    apixaban (ELIQUIS) tablet 2 5 mg, 2 5 mg, Oral, BID, Mylene Arellano MD, 2 5 mg at 10/26/21 1323    atorvastatin (LIPITOR) tablet 40 mg, 40 mg, Oral, HS, Wolfgang R Saray Marx MD, 40 mg at 10/25/21 2127    calcitriol (ROCALTROL) capsule 0 25 mcg, 0 25 mcg, Oral, Daily, Clarence Ochoa MD, 0 25 mcg at 10/26/21 1323    cholecalciferol (VITAMIN D) oral liquid 800 Units, 800 Units, Oral, Daily, Clarence Ochoa MD, 800 Units at 10/25/21 0925    citalopram (CeleXA) tablet 20 mg, 20 mg, Oral, Daily, Clarence Ochoa MD, 20 mg at 10/26/21 1323    docusate sodium (COLACE) capsule 100 mg, 100 mg, Oral, BID, Clarence Ochoa MD, 100 mg at 10/25/21 1726    levothyroxine tablet 75 mcg, 75 mcg, Oral, Daily, Clarence Ochoa MD, 75 mcg at 10/26/21 1324    melatonin tablet 3 mg, 3 mg, Oral, HS, Clarence Ochoa MD, 3 mg at 10/25/21 2127    metoprolol (LOPRESSOR) injection 2 5 mg, 2 5 mg, Intravenous, Q6H PRN, Carly Turner PA-C, 2 5 mg at 10/20/21 1618    metoprolol tartrate (LOPRESSOR) tablet 25 mg, 25 mg, Oral, BID, Elijah Funse PA-C, 25 mg at 10/26/21 1323    ondansetron (ZOFRAN) injection 4 mg, 4 mg, Intravenous, Q6H PRN, Clarence Ochoa MD, 4 mg at 10/14/21 2153    pantoprazole (PROTONIX) EC tablet 40 mg, 40 mg, Oral, BID AC, Mumtaz Laguerre DO, 40 mg at 10/26/21 0604    saccharomyces boulardii (FLORASTOR) capsule 250 mg, 250 mg, Oral, Daily, Clarence Ochoa MD, 250 mg at 10/25/21 6953    senna (SENOKOT) tablet 8 6 mg, 1 tablet, Oral, Daily, Clarence Ochoa MD, 8 6 mg at 10/25/21 0920    Laboratory Results:  Results from last 7 days   Lab Units 10/26/21  0844 10/22/21  0718 10/22/21  0717 10/21/21  0932 10/20/21  0600   WBC Thousand/uL  --   --  10 11 10 98* 11 25*   HEMOGLOBIN g/dL  --   --  7 5* 8 2* 8 5*   HEMATOCRIT %  --   --  24 1* 26 8* 27 9*   PLATELETS Thousands/uL  --   --  79* 96* 127*   SODIUM mmol/L 134* 134*  --  137 138   POTASSIUM mmol/L 3 9 3 4*  --  3 6 4 2   CHLORIDE mmol/L 101 100  --  104 103   CO2 mmol/L 26 25  --  21 20*   BUN mg/dL 36* 55*  --  92* 128*   CREATININE mg/dL 4 77* 3 86*  --  5 29* 6 78*   CALCIUM mg/dL 8 3 8 2*  -- 7  9* 8 3   PHOSPHORUS mg/dL 5 9*  --   --   --   --        US right upper quadrant   Final Result by Ashleigh Bell MD (10/22 1554)      Nonobstructive right renal calculi  Otherwise unremarkable sonographic study  Please note CT of 10/13/2021 demonstrated a left kidney subcapsular collection and left abdominal and right lower quadrant collection  Workstation performed: [id number]         IR tunneled dialysis catheter placement   Final Result by Jeff Deras MD (10/21 1118)   Impression:      Successful placement of a left internal jugular vein 32 cm tunneled dialysis catheter  Workstation performed: [id number]         US kidney and bladder   Final Result by Isaak Tong MD (10/20 3327)      1  Mild right upper pole caliectasis, difficult to compare to prior CT, but likely present to some degree on that study  Multiple nonobstructing right renal calculi  2   Difficult visualization of the left kidney due to body habitus  No gross hydronephrosis  Partially visualized left perinephric drainage catheter with likely some residual perinephric collection, noting poor evaluation  3   Unchanged 7 6 cm simple right lower quadrant fluid collection and fluid collection versus free fluid in the cul-de-sac, as seen on prior CT  Workstation performed: [id number]         VAS hemodialysis access duplex right upper limb avf   Final Result by Stormy Mckee MD (10/20 2209)      XR chest portable   Final Result by Jeff Deras MD (10/18 0706)      Persistent left pleural disease                    Workstation performed: [id number]         IR drainage tube placement   Final Result by Kassidy Burrell MD (10/13 2041)   Impression:       Image guided 10 Nigerien drain placement into a left flank/paracolic gutter collection       Image guided 10 Nigerien drain placement into a left perinephric collection         Workstation performed: [id number]         CT chest abdomen pelvis wo contrast   Final Result by Isabella Lugo MD (10/13 1503)      There is a increasing dilation of the small bowel loops in the upper to mid abdomen with the zone of transition suggest partial small bowel obstruction       New loculated fluid collection in the left paracolic gutter measuring 6 8 x 3 6 x 12 6 cm      Additional loculated fluid collection within the pelvic cul-de-sac, stable   Previously noted the postoperative right iliac fossa collection likely related to suggest lymphocele/seroma, stable      Retrograde the left nephroureteral stent with resolution of the left hydronephrosis      Subcapsular fluid collection left kidney, moderate on previous study      No new collection with the air-fluid level      No airspace consolidations lungs   Area of groundglass density seen in the posterior aspect of the left upper lobe may be due to atelectasis/pneumonitis       I personally discussed this study with Linden Becerra on 10/13/2021 at 2:29 PM                Workstation performed: [id number]         IR IN-Patient Thoracentesis   Final Result by Joe Da Silva MD (10/12 9154)   Impression:      Ultrasound-guided left thoracentesis      Small effusion which was difficult to aspirate  Workstation performed: [id number]         IR non-tunneled central line placement   Final Result by Joe Da Silva MD (10/12 7150)   Impression:       Image guided placement of a nontunneled 5 Bulgarian double-lumen power injectable central venous catheter via the right external jugular vein  Workstation performed: [id number]         VAS hemodialysis access duplex right upper limb avf   Final Result by Sophie Olivier MD (10/11 1031)      CT abdomen pelvis wo contrast   Final Result by Pari Medina MD (10/09 1625)      In the left renal fossa, there is a collection of mixed intermediate and high attenuation suggesting a hematoma with some bubbles of gas    Pigtail catheter is noted medial to the kidney  The renal pelvis may be collapsed  There appears to be some    hemorrhage and thickening extending in the combined interfascial plane of the retroperitoneum as well as some high density fluid within the pelvis suggesting hemoperitoneum  Superinfection of the kidney is possible  Urology evaluation is advised  Nonobstructing calculi in the right kidney  Some prominent loops of bowel are more likely large bowel suggesting ileus  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  This was discussed with Dr Bessy Whitaker at 4:20 PM               Workstation performed: [id number]         XR chest portable   Final Result by Suzanne De Leon MD (10/10 9492)      Medial left base slight atelectasis and or small infiltrate  Small left effusion      The study was marked in EPIC for immediate notification  Workstation performed: [id number]         IR AV fistulagram/graftogram    (Results Pending)   IR drainage tube check/change/reposition/reinsertion/upsize    (Results Pending)       Portions of the record may have been created with voice recognition software  Occasional wrong word or "sound a like" substitutions may have occurred due to the inherent limitations of voice recognition software  Read the chart carefully and recognize, using context, where substitutions have occurred

## 2021-10-26 NOTE — PROGRESS NOTES
Progress Note - Infectious Disease   Elham Goldberg 77 y o  female MRN: [id number]  Unit/Bed#: [facility] 929-01 Encounter: [id number]      Impression/Plan:  1  Sepsis, POA   Patient presented with progressing flank/abdominal discomfort likely related to problem 3  Clinical parameters had improved  Overall poor progress likely due to 4  Maintain off antibiotics currently  Continue to trend fever curve/WBC  Ongoing follow-up by Nephrology  Additional supportive care as per primary     2  Fusobacterium bacteremia   Source unknown, possibly transient given 1 or occult GI source  Atypical pathogen to be isolated and cause urinary tract infection   CT imaging of the abdomen on admission unremarkable  Repeat imaging with new collections noted in the abdomen and partial SBO, potential source   Drains placed   Patient without any teeth   Repeat blood cultures without growth   Not isolated on fluid cultures  No prior C-scope on chart review   EGD unremarkable   Transaminitis as below  Imaging of the right upper quadrant ultrasound unremarkable  Completed empiric course of Flagyl  Maintain off antibiotics current  Continue to trend fever curve/WBC  Ongoing follow-up by GI  Consider C scope once more stable/outpatient  Supportive care as per primary     3  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   Patient has a chronic obstructive uropathy involving the left side and recently had PCN removal   Subsequently developed bleeding at the site and hematoma on imaging likely due to chronic anticoagulation   IR aspiration and drain placement on 10/13 and earlier urine cultures with same organisms   Drain providing source control    Plans for tube check and fistulogram   Maintain off antibiotics current  Ongoing follow-up by IR  Will follow-up tube check results  Supportive care as per primary     4  LUANN on CKD with fistula  Patient with baseline chronic kidney disease with fistula created in the right upper extremity   Stenosis causing swelling, improving   Kidney function worsening over entire admission   Will attempt to avoid certain antibiotics   Some subjective improvement with dialysis initiation  Plan for fistulogram   Repeat BMP  Ongoing follow-up by Nephrology  Vascular surgery follow-up/imaging  Will follow-up fistulogram results     5  History of C diff and ongoing diarrhea   Patient had a history of C diff in   She has had repeat PCRs in 2021 and now negative despite having diarrhea   Likely related to 9  Maintain off antibiotics as above  Monitor stool output  No C diff prophylaxis     6  Polycythemia vera and MDS   Ongoing management/supportive care as per primary   Recommend follow-up with Oncology as outpatient      7  Prior PE on anticoagulation   Likely risk factor for issues as above  AC as per primary        8  Transaminitis   Patient noted to have slowly increasing alkaline phosphatase and AST   In the setting of this bacteremia consider occult abscess  Now downtrending/stable  Liver/RUQ ultrasound unremarkable  Monitor LFTs  Complete antibiotics as above  GI evaluation noted     9  Melena   Possible upper GI bleed   Ongoing follow-up and workup as per GI   EGD reportedly unremarkable   Additional care as per primary       Above plan discussed briefly with the patient at bedside      ID consult service will continue to follow      Antibiotics:  Off systemic antibiotics day 1    Subjective:  Patient currently denies having any nausea, vomiting, chest pain  Reports feeling some chills  Continues to have fatigue  She has no other localizing symptoms to report  Currently seen during dialysis which she is tolerating      Objective:  Vitals:  Temp:  [97 5 °F (36 4 °C)-98 3 °F (36 8 °C)] 97 5 °F (36 4 °C)  HR:  [75-86] 78  Resp:  [16-18] 16  BP: ()/(50-79) 122/50  SpO2:  [95 %-97 %] 97 %  Temp (24hrs), Av °F (36 7 °C), Min:97 5 °F (36 4 °C), Max:98 3 °F (36 8 °C)  Current: Temperature: 97 5 °F (36 4 °C)    Physical Exam:   General Appearance:  Alert, interactive, nontoxic, no acute distress  Chronically ill-appearing and frail  Throat: Deferred as patient is wearing a mask   Lungs:   Clear to auscultation bilaterally; no wheezes, rhonchi or rales; respirations unlabored on room air   Heart:  RRR; no murmur, rub or gallop   Abdomen:   Soft, non-tender, non-distended, positive bowel sounds  Extremities: No clubbing, cyanosis; continued edema in all of her extremities   Skin: No new rashes or lesions  No new draining wounds noted  Catheter site noted on the chest with some oozing around it         Labs, Imaging, & Other studies:   All pertinent labs and imaging studies were personally reviewed  Results from last 7 days   Lab Units 10/22/21  0717 10/21/21  0932 10/20/21  0600   WBC Thousand/uL 10 11 10 98* 11 25*   HEMOGLOBIN g/dL 7 5* 8 2* 8 5*   PLATELETS Thousands/uL 79* 96* 127*     Results from last 7 days   Lab Units 10/26/21  0844 10/22/21  0718 10/21/21  0932 10/20/21  0600   POTASSIUM mmol/L 3 9 3 4* 3 6 4 2   CHLORIDE mmol/L 101 100 104 103   CO2 mmol/L 26 25 21 20*   BUN mg/dL 36* 55* 92* 128*   CREATININE mg/dL 4 77* 3 86* 5 29* 6 78*   EGFR ml/min/1 73sq m 8 11 7 5   CALCIUM mg/dL 8 3 8 2* 7 9* 8 3   AST U/L  --  51* 50* 39   ALT U/L  --  <6* <6* <6*   ALK PHOS U/L  --  206* 208* 210*

## 2021-10-26 NOTE — ASSESSMENT & PLAN NOTE
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

## 2021-10-26 NOTE — ASSESSMENT & PLAN NOTE
· Left renal hematoma:   · Presented with left renal hematoma  Pigtail catheter is noted medial to kidney  Renal pelvis may be collapsed  Hemorrhage and thickening extending in the combined interfascial place of retrospective as well as some high density fluid within  · S/p IR drainage 10/12/21 with JOSE ANTONIO drain placement x 2    · Output appears to be 30mL from both drains over 24 hour period    · Possible drain removal today at [facility]

## 2021-10-26 NOTE — PLAN OF CARE
Post-Dialysis RN Treatment Note    Blood Pressure:  Pre 120/75 mm/Hg  Post 122/50 mmHg   EDW TBD   Weight:  Pre 86 3 kg   Post 85 7 kg   Mode of weight measurement: Bed Scale   Volume Removed  500 ml UF goal was decreased d/t decrease in BP, patient asymptomatic   Treatment duration 210 minutes    NS given  No    Treatment shortened? No   Medications given during Rx None Reported   Estimated Kt/V     Access type: Permacath/TDC   Access Status: Yes, describe: BFR maintained at 350    Report called to primary nurse   Yes Pete Herbert RN      Pre HD: Plan for patient to receive 3 5 hour HD treatment with 1 kg fluid removal as tolerated      Problem: METABOLIC, FLUID AND ELECTROLYTES - ADULT  Goal: Electrolytes maintained within normal limits  Description: INTERVENTIONS:  - Monitor labs and assess patient for signs and symptoms of electrolyte imbalances  - Administer electrolyte replacement as ordered  - Monitor response to electrolyte replacements, including repeat lab results as appropriate  - Instruct patient on fluid and nutrition as appropriate  Outcome: Progressing  Goal: Fluid balance maintained  Description: INTERVENTIONS:  - Monitor labs   - Monitor I/O and WT  - Instruct patient on fluid and nutrition as appropriate  - Assess for signs & symptoms of volume excess or deficit  Outcome: Progressing

## 2021-10-26 NOTE — CASE MANAGEMENT
CM spoke with pt's son to discuss discharge planning  Per pt's son he is okay with pt going to STR at d/c and gave CM permission to send referral to [address] [place] [address],[address] and [facility]  CM sent referral as requested via [facility]  CM also emailed pt's son with STR list at [email or web address] for him to review other potential facilities  Per pt's son after STR pt will return to live with his family  Pt's son also confirmed that pt was not vaccinated for COVID  CM will follow

## 2021-10-26 NOTE — PHYSICAL THERAPY NOTE
Physical Therapy Cancellation Note    PATIENT REMAINS ACTIVE ON PT CASELOAD  ATTEMPTED TO SEE HOWEVER OFF FLOOR AT HD  PT WILL CONTINUE TO FOLLOW ON CASELOAD AND PERFORM FUTURE TREATMENT SESSIONS AS MEDICALLY APPROPRIATE      NILAM PABON PT, DPT

## 2021-10-26 NOTE — ASSESSMENT & PLAN NOTE
· Previously with fever coupled with tachycardia and elevated procalcitonin  · Likely secondary to infected left renal hematoma -> Fusobacterium bacteremia noted - fluid culture from 10/13 s/p IR-guided drainage growing Enterobacter/Enterococcus  · Off ABX at this time  · Monitor closely

## 2021-10-26 NOTE — ASSESSMENT & PLAN NOTE
· Hx of Polycythemia vera and MDS  · Significant anemia secondary to blood loss in the past    · The patient is currently off of the [facility] treatment for her PV and getting mainly Aranesp on every 28 day basis    · Hematology consulted for evaluation, recommend once patient is better/ stable, discharge, she will follow-up with Dr Vaibhav Shelley, her primary hematologist

## 2021-10-26 NOTE — PLAN OF CARE
Problem: MOBILITY - ADULT  Goal: Maintain or return to baseline ADL function  Description: INTERVENTIONS:  -  Assess patient's ability to carry out ADLs; assess patient's baseline for ADL function and identify physical deficits which impact ability to perform ADLs (bathing, care of mouth/teeth, toileting, grooming, dressing, etc )  - Assess/evaluate cause of self-care deficits   - Assess range of motion  - Assess patient's mobility; develop plan if impaired  - Assess patient's need for assistive devices and provide as appropriate  - Encourage maximum independence but intervene and supervise when necessary  - Involve family in performance of ADLs  - Assess for home care needs following discharge   - Consider OT consult to assist with ADL evaluation and planning for discharge  - Provide patient education as appropriate  Outcome: Progressing  Goal: Maintains/Returns to pre admission functional level  Description: INTERVENTIONS:  - Perform BMAT or MOVE assessment daily    - Set and communicate daily mobility goal to care team and patient/family/caregiver  - Collaborate with rehabilitation services on mobility goals if consulted  - Perform Range of Motion times a day  - Reposition patient every hours    - Dangle patient times a day  - Stand patient times a day  - Ambulate patient times a day  - Out of bed to chair times a day   - Out of bed for meals times a day  - Out of bed for toileting  - Record patient progress and toleration of activity level   Outcome: Progressing     Problem: Potential for Falls  Goal: Patient will remain free of falls  Description: INTERVENTIONS:  - Educate patient/family on patient safety including physical limitations  - Instruct patient to call for assistance with activity   - Consult OT/PT to assist with strengthening/mobility   - Keep Call bell within reach  - Keep bed low and locked with side rails adjusted as appropriate  - Keep care items and personal belongings within reach  - Initiate and maintain comfort rounds  - Make Fall Risk Sign visible to staff  - Offer Toileting every Hours, in advance of need  - Initiate/Maintain alarm  - Obtain necessary fall risk management equipment:   - Apply yellow socks and bracelet for high fall risk patients  - Consider moving patient to room near nurses station  Outcome: Progressing     Problem: Prexisting or High Potential for Compromised Skin Integrity  Goal: Skin integrity is maintained or improved  Description: INTERVENTIONS:  - Identify patients at risk for skin breakdown  - Assess and monitor skin integrity  - Assess and monitor nutrition and hydration status  - Monitor labs   - Assess for incontinence   - Turn and reposition patient  - Assist with mobility/ambulation  - Relieve pressure over bony prominences  - Avoid friction and shearing  - Provide appropriate hygiene as needed including keeping skin clean and dry  - Evaluate need for skin moisturizer/barrier cream  - Collaborate with interdisciplinary team   - Patient/family teaching  - Consider wound care consult   Outcome: Progressing     Problem: Nutrition/Hydration-ADULT  Goal: Nutrient/Hydration intake appropriate for improving, restoring or maintaining nutritional needs  Description: Monitor and assess patient's nutrition/hydration status for malnutrition  Collaborate with interdisciplinary team and initiate plan and interventions as ordered  Monitor patient's weight and dietary intake as ordered or per policy  Utilize nutrition screening tool and intervene as necessary  Determine patient's food preferences and provide high-protein, high-caloric foods as appropriate       INTERVENTIONS:  - Monitor oral intake, urinary output, labs, and treatment plans  - Assess nutrition and hydration status and recommend course of action  - Evaluate amount of meals eaten  - Assist patient with eating if necessary   - Allow adequate time for meals  - Recommend/ encourage appropriate diets, oral nutritional supplements, and vitamin/mineral supplements  - Order, calculate, and assess calorie counts as needed  - Recommend, monitor, and adjust tube feedings and TPN/PPN based on assessed needs  - Assess need for intravenous fluids  - Provide specific nutrition/hydration education as appropriate  - Include patient/family/caregiver in decisions related to nutrition  Outcome: Progressing     Problem: METABOLIC, FLUID AND ELECTROLYTES - ADULT  Goal: Electrolytes maintained within normal limits  Description: INTERVENTIONS:  - Monitor labs and assess patient for signs and symptoms of electrolyte imbalances  - Administer electrolyte replacement as ordered  - Monitor response to electrolyte replacements, including repeat lab results as appropriate  - Instruct patient on fluid and nutrition as appropriate  Outcome: Progressing  Goal: Fluid balance maintained  Description: INTERVENTIONS:  - Monitor labs   - Monitor I/O and WT  - Instruct patient on fluid and nutrition as appropriate  - Assess for signs & symptoms of volume excess or deficit  Outcome: Progressing

## 2021-10-26 NOTE — CASE MANAGEMENT
CM spoke to pt's son and notified him of the status of referral sent  Pt's son gave CM permission to send blanket referral  CM will follow

## 2021-10-26 NOTE — ASSESSMENT & PLAN NOTE
· Blood cultures from 10/9 growing Fusobacterium - IV Zosyn transitioned to PO Flagyl per Infectious Disease  · Repeat blood cultures 10/12 are negative

## 2021-10-26 NOTE — OCCUPATIONAL THERAPY NOTE
Occupational Therapy Cancel Note         Patient Name: Luis Garvin  UOZCV'Y Date: 10/26/2021         10/26/21 1156   OT Last Visit   OT Visit Date 10/26/21   Note Type   Note Type Treatment   Cancel Reasons Patient off floor/test         OT orders received  Chart reviewed  Pt off floor at dialysis  Will continue to follow and evaulate/treat pt as appropriate       Memo Miller MS, OTR/L

## 2021-10-26 NOTE — PROGRESS NOTES
[facility]  Progress Note - Naz Brown [id number], 77 y o  female MRN: [id number]  Unit/Bed#: [facility] 929-01 Encounter: [id number]  Primary Care Provider: Sarah Church MD   Date and time admitted to hospital: 10/9/2021  2:19 PM    * Sepsis on admission  Assessment & Plan  · Previously with fever coupled with tachycardia and elevated procalcitonin  · Likely secondary to infected left renal hematoma -> Fusobacterium bacteremia noted - fluid culture from 10/13 s/p IR-guided drainage growing Enterobacter/Enterococcus  · Off ABX at this time  · Monitor closely    Bacteremia  Assessment & Plan  · Blood cultures from 10/9 growing Fusobacterium - IV Zosyn transitioned to PO Flagyl per Infectious Disease  · Repeat blood cultures 10/12 are negative    Renal hematoma, left  Assessment & Plan  · Left renal hematoma:   · Presented with left renal hematoma  Pigtail catheter is noted medial to kidney  Renal pelvis may be collapsed  Hemorrhage and thickening extending in the combined interfascial place of retrospective as well as some high density fluid within  · S/p IR drainage 10/12/21 with JOSE ANTONIO drain placement x 2    · Output appears to be 30mL from both drains over 24 hour period  · Possible drain removal today at fistulagram          Thrombocytopenia  Assessment & Plan  Monitor platelet count - monitor for bleeding    Hypothyroidism  Assessment & Plan  C/w Synthroid     Melena  Assessment & Plan  underwent endoscopy without evidence of acute bleeding  Monitor hemoglobin  Hb has been stable    Swelling of right upper extremity  Assessment & Plan  · Underwent repeat RUE vascular doppler study noting "a narrowing in the subclavian vein at the clavicle created elevated  PSV and turbulent flow  The dialysis AVF appears to be matured with adequate diameter, flow volumes and less than 6mm in depth throughout the arm  Antegrade, high resistant blunted flow noted in the peripheral arteries  No evidence of outflow obstruction  No evidence of a pseudoaneurysm  No evidence of a large venous branch "  · 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  · For fistulagram today    Pleural effusion  Assessment & Plan  Mild to moderate pleural effusion on CXR  S/p thoracentesis by IR 10/11/21   -patient currently comfortable on room air, chest x-ray from prior shows persistent left pleural effusion      Acute renal failure superimposed on CKD stage 5  Assessment & Plan  · Progressively worsening renal failure  · Dialysis per nephrology  · Nephrology input noted    Anemia of chronic disease  Assessment & Plan  Monitor hemoglobin    Polycythemia vera ([facility] [address] )  Assessment & Plan  · Hx of Polycythemia vera and MDS  · Significant anemia secondary to blood loss in the past    · The patient is currently off of the [facility] treatment for her PV and getting mainly Aranesp on every 28 day basis  · Hematology consulted for evaluation, recommend once patient is better/ stable, discharge, she will follow-up with Dr Nataliia Chilel, her primary hematologist     Essential hypertension  Assessment & Plan  Monitor blood pressures  Avoid hypotension    Obstructive uropathy  Assessment & Plan  · Obstructive nephropathy  · Chronic bilateral hydronephrosis stents were recently removed  · Urology and Nephrology following; recommend continuing antibiotics and present treatment      History of pulmonary embolism  Assessment & Plan  Eliquis for anticoagulation        VTE Pharmacologic Prophylaxis:   Pharmacologic: Apixaban (Eliquis)  Mechanical VTE Prophylaxis in Place: Yes    Patient Centered Rounds: I have performed bedside rounds with nursing staff today  Discussions with Specialists or Other Care Team Provider: ID    Education and Discussions with Family / Patient: patient, plan of care    Time Spent for Care: 20 minutes    More than 50% of total time spent on counseling and coordination of care as described above  Current Length of Stay: 17 day(s)    Current Patient Status: Inpatient   Certification Statement: The patient will continue to require additional inpatient hospital stay due to fistula and tube check today    Discharge Plan: SNF when stable    Code Status: Level 1 - Full Code      Subjective:   Reports mild nausea, but tolerating diet  No diarrhea  Objective:     Vitals:   Temp (24hrs), Av 8 °F (36 6 °C), Min:97 5 °F (36 4 °C), Max:98 1 °F (36 7 °C)    Temp:  [97 5 °F (36 4 °C)-98 1 °F (36 7 °C)] 97 5 °F (36 4 °C)  HR:  [75-80] 78  Resp:  [16-18] 16  BP: ()/(50-79) 122/50  SpO2:  [97 %] 97 %  Body mass index is 37 11 kg/m²  Input and Output Summary (last 24 hours): Intake/Output Summary (Last 24 hours) at 10/26/2021 1511  Last data filed at 10/26/2021 1225  Gross per 24 hour   Intake 980 ml   Output 1100 ml   Net -120 ml       Physical Exam:     Physical Exam  Constitutional:       Appearance: Normal appearance  She is obese  She is ill-appearing  HENT:      Head: Normocephalic and atraumatic  Nose: Nose normal       Mouth/Throat:      Mouth: Mucous membranes are moist       Pharynx: Oropharynx is clear  Eyes:      Extraocular Movements: Extraocular movements intact  Cardiovascular:      Rate and Rhythm: Normal rate and regular rhythm  Pulmonary:      Effort: Pulmonary effort is normal    Musculoskeletal:      Right lower leg: No edema  Left lower leg: No edema  Comments: RUE edema   Skin:     General: Skin is warm and dry  Neurological:      General: No focal deficit present  Mental Status: She is alert and oriented to person, place, and time             Additional Data:     Labs:    Results from last 7 days   Lab Units 10/22/21  0717   WBC Thousand/uL 10 11   HEMOGLOBIN g/dL 7 5*   HEMATOCRIT % 24 1*   PLATELETS Thousands/uL 79*   NEUTROS PCT % 83*   LYMPHS PCT % 7*   MONOS PCT % 8   EOS PCT % 0     Results from last 7 days   Lab Units 10/26/21  0844 10/22/21  0718   SODIUM mmol/L 134* 134*   POTASSIUM mmol/L 3 9 3 4*   CHLORIDE mmol/L 101 100   CO2 mmol/L 26 25   BUN mg/dL 36* 55*   CREATININE mg/dL 4 77* 3 86*   ANION GAP mmol/L 7 9   CALCIUM mg/dL 8 3 8 2*   ALBUMIN g/dL  --  1 5*   TOTAL BILIRUBIN mg/dL  --  1 08*   ALK PHOS U/L  --  206*   ALT U/L  --  <6*   AST U/L  --  51*   GLUCOSE RANDOM mg/dL 106 85         Results from last 7 days   Lab Units 10/23/21  0757   POC GLUCOSE mg/dl 113         Results from last 7 days   Lab Units 10/23/21  0618 10/22/21  0718   PROCALCITONIN ng/ml 29 16* 36 08*           * I Have Reviewed All Lab Data Listed Above  * Additional Pertinent Lab Tests Reviewed:  All Labs Within Last 24 Hours Reviewed      Recent Cultures (last 7 days):           Last 24 Hours Medication List:   Current Facility-Administered Medications   Medication Dose Route Frequency Provider Last Rate    acetaminophen  650 mg Oral Q6H PRN Vishal Stewart MD      aluminum-magnesium hydroxide-simethicone  30 mL Oral Q6H PRN Vishal Stewart MD      apixaban  2 5 mg Oral BID Sara Cramer MD      atorvastatin  40 mg Oral HS Vishal Stewart MD      calcitriol  0 25 mcg Oral Daily Vishal Stewart MD      cholecalciferol  800 Units Oral Daily Vishal Stewart MD      citalopram  20 mg Oral Daily Vishal Stewart MD      docusate sodium  100 mg Oral BID Vishal Stewart MD      levothyroxine  75 mcg Oral Daily Vishal Stewart MD      melatonin  3 mg Oral HS Vishal Stewart MD      metoprolol  2 5 mg Intravenous Q6H PRN Tunde Briggs PA-TRINIDAD      metoprolol tartrate  25 mg Oral BID Tunde Briggs PA-C      ondansetron  4 mg Intravenous Q6H PRN Vishal Stewart MD      pantoprazole  40 mg Oral BID AC Mumtaz Laguerre, DO      saccharomyces boulardii  250 mg Oral Daily Vishal Stewart MD      senna  1 tablet Oral Daily Vishal Stewart MD          Today, Patient Was Seen By: David Osullivan MD    ** Please Note: Dictation voice to text software may have been used in the creation of this document   **

## 2021-10-26 NOTE — ASSESSMENT & PLAN NOTE
· Obstructive nephropathy  · Chronic bilateral hydronephrosis stents were recently removed    · Urology and Nephrology following; recommend continuing antibiotics and present treatment

## 2021-10-27 ENCOUNTER — PATIENT OUTREACH (OUTPATIENT)
Dept: CASE MANAGEMENT | Facility: HOSPITAL | Age: 76
End: 2021-10-27

## 2021-10-27 ENCOUNTER — APPOINTMENT (INPATIENT)
Dept: RADIOLOGY | Facility: HOSPITAL | Age: 76
DRG: 981 | End: 2021-10-27
Payer: COMMERCIAL

## 2021-10-27 ENCOUNTER — APPOINTMENT (INPATIENT)
Dept: RADIOLOGY | Facility: HOSPITAL | Age: 76
DRG: 981 | End: 2021-10-27
Attending: RADIOLOGY
Payer: COMMERCIAL

## 2021-10-27 PROBLEM — D69.6 THROMBOCYTOPENIA (HCC): Status: RESOLVED | Noted: 2021-10-19 | Resolved: 2021-10-27

## 2021-10-27 LAB
ANION GAP SERPL CALCULATED.3IONS-SCNC: 6 MMOL/L (ref 4–13)
BUN SERPL-MCNC: 16 MG/DL (ref 5–25)
CALCIUM SERPL-MCNC: 8.4 MG/DL (ref 8.3–10.1)
CHLORIDE SERPL-SCNC: 102 MMOL/L (ref 100–108)
CO2 SERPL-SCNC: 28 MMOL/L (ref 21–32)
CREAT SERPL-MCNC: 2.9 MG/DL (ref 0.6–1.3)
ERYTHROCYTE [DISTWIDTH] IN BLOOD BY AUTOMATED COUNT: 20.2 % (ref 11.6–15.1)
GFR SERPL CREATININE-BSD FRML MDRD: 15 ML/MIN/1.73SQ M
GLUCOSE SERPL-MCNC: 88 MG/DL (ref 65–140)
HCT VFR BLD AUTO: 24.3 % (ref 34.8–46.1)
HGB BLD-MCNC: 7.2 G/DL (ref 11.5–15.4)
MCH RBC QN AUTO: 28.9 PG (ref 26.8–34.3)
MCHC RBC AUTO-ENTMCNC: 29.6 G/DL (ref 31.4–37.4)
MCV RBC AUTO: 98 FL (ref 82–98)
PLATELET # BLD AUTO: 151 THOUSANDS/UL (ref 149–390)
PMV BLD AUTO: 11.4 FL (ref 8.9–12.7)
POTASSIUM SERPL-SCNC: 4 MMOL/L (ref 3.5–5.3)
RBC # BLD AUTO: 2.49 MILLION/UL (ref 3.81–5.12)
SODIUM SERPL-SCNC: 136 MMOL/L (ref 136–145)
WBC # BLD AUTO: 4.77 THOUSAND/UL (ref 4.31–10.16)

## 2021-10-27 PROCEDURE — 36902 INTRO CATH DIALYSIS CIRCUIT: CPT

## 2021-10-27 PROCEDURE — 49423 EXCHANGE DRAINAGE CATHETER: CPT | Performed by: RADIOLOGY

## 2021-10-27 PROCEDURE — 49423 EXCHANGE DRAINAGE CATHETER: CPT

## 2021-10-27 PROCEDURE — 0W2GX0Z CHANGE DRAINAGE DEVICE IN PERITONEAL CAVITY, EXTERNAL APPROACH: ICD-10-PCS | Performed by: RADIOLOGY

## 2021-10-27 PROCEDURE — 80048 BASIC METABOLIC PNL TOTAL CA: CPT | Performed by: INTERNAL MEDICINE

## 2021-10-27 PROCEDURE — 75984 XRAY CONTROL CATHETER CHANGE: CPT | Performed by: RADIOLOGY

## 2021-10-27 PROCEDURE — 76937 US GUIDE VASCULAR ACCESS: CPT | Performed by: RADIOLOGY

## 2021-10-27 PROCEDURE — 74176 CT ABD & PELVIS W/O CONTRAST: CPT

## 2021-10-27 PROCEDURE — C1769 GUIDE WIRE: HCPCS

## 2021-10-27 PROCEDURE — 36907 BALO ANGIOP CTR DIALYSIS SEG: CPT | Performed by: RADIOLOGY

## 2021-10-27 PROCEDURE — 75984 XRAY CONTROL CATHETER CHANGE: CPT

## 2021-10-27 PROCEDURE — C1894 INTRO/SHEATH, NON-LASER: HCPCS

## 2021-10-27 PROCEDURE — 85027 COMPLETE CBC AUTOMATED: CPT | Performed by: INTERNAL MEDICINE

## 2021-10-27 PROCEDURE — 05753ZZ DILATION OF RIGHT SUBCLAVIAN VEIN, PERCUTANEOUS APPROACH: ICD-10-PCS | Performed by: RADIOLOGY

## 2021-10-27 PROCEDURE — 99233 SBSQ HOSP IP/OBS HIGH 50: CPT | Performed by: INTERNAL MEDICINE

## 2021-10-27 PROCEDURE — 36907 BALO ANGIOP CTR DIALYSIS SEG: CPT

## 2021-10-27 PROCEDURE — C1729 CATH, DRAINAGE: HCPCS

## 2021-10-27 PROCEDURE — 99232 SBSQ HOSP IP/OBS MODERATE 35: CPT | Performed by: INTERNAL MEDICINE

## 2021-10-27 PROCEDURE — 36902 INTRO CATH DIALYSIS CIRCUIT: CPT | Performed by: RADIOLOGY

## 2021-10-27 PROCEDURE — C1725 CATH, TRANSLUMIN NON-LASER: HCPCS

## 2021-10-27 PROCEDURE — 76937 US GUIDE VASCULAR ACCESS: CPT

## 2021-10-27 RX ADMIN — CALCITRIOL 0.25 MCG: 0.25 CAPSULE, LIQUID FILLED ORAL at 09:21

## 2021-10-27 RX ADMIN — Medication 250 MG: at 09:21

## 2021-10-27 RX ADMIN — LEVOTHYROXINE SODIUM 75 MCG: 75 TABLET ORAL at 09:21

## 2021-10-27 RX ADMIN — DOCUSATE SODIUM 100 MG: 100 CAPSULE, LIQUID FILLED ORAL at 19:06

## 2021-10-27 RX ADMIN — ATORVASTATIN CALCIUM 40 MG: 40 TABLET, FILM COATED ORAL at 22:30

## 2021-10-27 RX ADMIN — IOHEXOL 67 ML: 350 INJECTION, SOLUTION INTRAVENOUS at 12:28

## 2021-10-27 RX ADMIN — APIXABAN 2.5 MG: 2.5 TABLET, FILM COATED ORAL at 09:21

## 2021-10-27 RX ADMIN — PANTOPRAZOLE SODIUM 40 MG: 40 TABLET, DELAYED RELEASE ORAL at 19:06

## 2021-10-27 RX ADMIN — METOPROLOL TARTRATE 25 MG: 25 TABLET, FILM COATED ORAL at 19:06

## 2021-10-27 RX ADMIN — APIXABAN 2.5 MG: 2.5 TABLET, FILM COATED ORAL at 19:06

## 2021-10-27 RX ADMIN — MELATONIN TAB 3 MG 3 MG: 3 TAB at 22:30

## 2021-10-27 RX ADMIN — CITALOPRAM HYDROBROMIDE 20 MG: 20 TABLET ORAL at 09:21

## 2021-10-27 RX ADMIN — METOPROLOL TARTRATE 25 MG: 25 TABLET, FILM COATED ORAL at 09:21

## 2021-10-27 NOTE — PROGRESS NOTES
Progress Note - Infectious Disease   Ralph Drake 77 y o  female MRN: [id number]  Unit/Bed#: [facility] 929-01 Encounter: [id number]      Impression/Plan:  1  Sepsis, POA   Patient presented with progressing flank/abdominal discomfort likely related to problem 3  Clinical parameters had improved  Overall poor progress likely due to 4    Maintain off antibiotics currently  Continue to trend fever curve/WBC  Ongoing follow-up by Nephrology  Additional supportive care as per primary     2  Fusobacterium bacteremia   Source unknown, possibly transient given 1 or occult GI source  Atypical pathogen to be isolated and cause urinary tract infection   CT imaging of the abdomen on admission unremarkable  Repeat imaging with new collections noted in the abdomen and partial SBO, potential source   Drains placed   Patient without any teeth   Repeat blood cultures without growth   Not isolated on fluid cultures  No prior C-scope on chart review   EGD unremarkable   Transaminitis as below   Imaging of the right upper quadrant ultrasound unremarkable  Completed empiric course of Flagyl  Maintain off antibiotics currently  Continue to trend fever curve/WBC  Ongoing follow-up by GI  Consider C scope once more stable/outpatient  Supportive care as per primary     3  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   Patient has a chronic obstructive uropathy involving the left side and recently had PCN removal   Subsequently developed bleeding at the site and hematoma on imaging likely due to chronic anticoagulation   IR aspiration and drain placement on 10/13 and earlier urine cultures with same organisms   Drain providing source control  Fistulogram and tube check noted today    Maintain off antibiotics currently  Ongoing follow-up by IR  Would obtain repeat CT, rule out residual fluid requiring new drain  Supportive care as per primary     4  LUANN on CKD with fistula  Patient with baseline chronic kidney disease with fistula created in the right upper extremity   Stenosis causing swelling, improving  Moreno Peat function worsening over entire admission   Will attempt to avoid certain antibiotics   Some subjective improvement with dialysis initiation  Fistulogram noted  Repeat BMP  Ongoing follow-up by Nephrology  Vascular surgery follow-up/imaging     5  History of C diff and ongoing diarrhea   Patient had a history of C diff in 2019  She has had repeat PCRs in July 2021 and now negative despite having diarrhea   Likely related to 9  Maintain off antibiotics as above  Monitor stool output  No C diff prophylaxis     6  Polycythemia vera and MDS   Ongoing management/supportive care as per primary   Recommend follow-up with Oncology as outpatient      7  Prior PE on anticoagulation   Likely risk factor for issues as above  AC as per primary        8  Transaminitis   Patient noted to have slowly increasing alkaline phosphatase and AST   In the setting of this bacteremia consider occult abscess  Now downtrending/stable   Liver/RUQ ultrasound unremarkable  Monitor LFTs  Complete antibiotics as above  GI evaluation noted     9  Melena   Possible upper GI bleed   Ongoing follow-up and workup as per GI   EGD reportedly unremarkable   Additional care as per primary       Above plan discussed briefly with the patient at bedside      ID consult service will continue to follow      Antibiotics:  Off systemic antibiotics day 2    Subjective:  Patient is seen after procedures today  She denied having any nausea, vomiting, chest pain  Reports again having overall profound fatigue  IR procedure notes reviewed and various stenoses were opened in the fistula  Patient had 1 drain exchanged and repositioned into residual cavity  The patient's anterior drain was completely dislodged  She was recommended for repeat CT scan to assess for residual cavity/fluid      Objective:  Vitals:  Temp:  [97 1 °F (36 2 °C)-98 7 °F (37 1 °C)] 97 1 °F (36 2 °C)  HR:  [64-81] 75  Resp:  [12-20] 17  BP: ()/(55-72) 118/70  SpO2:  [92 %-100 %] 100 %  Temp (24hrs), Av 8 °F (36 6 °C), Min:97 1 °F (36 2 °C), Max:98 7 °F (37 1 °C)  Current: Temperature: (!) 97 1 °F (36 2 °C)    Physical Exam:   General Appearance:  Alert, interactive, nontoxic, no acute distress  Chronically ill-appearing and frail  Appears volume overloaded  Throat: Oropharynx moist without lesions  Lungs:   Decreased breath sounds throughout, no wheezes or rales   Heart:  RRR; no murmur, rub or gallop appreciated   Abdomen:   Soft, non-tender, non-distended, positive bowel sounds  Extremities: No clubbing, cyanosis; ongoing edema in all of her extremities   Skin: No new rashes or lesions  No new draining wounds noted         Labs, Imaging, & Other studies:   All pertinent labs and imaging studies were personally reviewed  Results from last 7 days   Lab Units 10/27/21  0555 10/22/21  0717 10/21/21  0932   WBC Thousand/uL 4 77 10 11 10 98*   HEMOGLOBIN g/dL 7 2* 7 5* 8 2*   PLATELETS Thousands/uL 151 79* 96*     Results from last 7 days   Lab Units 10/27/21  0555 10/22/21  0718 10/21/21  0932   POTASSIUM mmol/L 4 0 3 4* 3 6   CHLORIDE mmol/L 102 100 104   CO2 mmol/L 28 25 21   BUN mg/dL 16 55* 92*   CREATININE mg/dL 2 90* 3 86* 5 29*   EGFR ml/min/1 73sq m 15 11 7   CALCIUM mg/dL 8 4 8 2* 7 9*   AST U/L  --  51* 50*   ALT U/L  --  <6* <6*   ALK PHOS U/L  --  206* 208*

## 2021-10-27 NOTE — UTILIZATION REVIEW
Continued Stay Review    Date: 10/21/2021                        Current Patient Class: inpatient  Current Level of Care:  Med/surg  HPI:76 y o  female initially admitted on 10/9 with sepsis, bacteremia  Assessment/Plan:  Pt reports again having overall profound fatigue  Continue to monitor off abx per ID  Renal abscess drain repositioned today by AYLIN bonilla removed  Recheck CT scan to evaluate for resolution of abscess       Vital Signs:   Date/Time  Temp  Pulse  Resp  BP  MAP (mmHg)  SpO2  Calculated FIO2 (%) - Nasal Cannula  Nasal Cannula O2 Flow Rate (L/min)  O2 Device   10/27/21 15:25:36  97 1 °F (36 2 °C)Abnormal   75  17  118/70  86  100 %  --  --  --   10/27/21 1200  --  66  16  126/67  --  100 %  28  2 L/min  Nasal cannula   10/27/21 1114  --  68  18  111/61  81  100 %  28  2 L/min  Nasal cannula   10/27/21 1100  --  69  18  --  --  100 %  28  2 L/min  Nasal cannula       Pertinent Labs/Diagnostic Results:     Results from last 7 days   Lab Units 10/27/21  0555 10/22/21  0717 10/21/21  0932   WBC Thousand/uL 4 77 10 11 10 98*   HEMOGLOBIN g/dL 7 2* 7 5* 8 2*   HEMATOCRIT % 24 3* 24 1* 26 8*   PLATELETS Thousands/uL 151 79* 96*   NEUTROS ABS Thousands/µL  --  8 37* 8 83*     Results from last 7 days   Lab Units 10/27/21  0555 10/26/21  0844 10/22/21  0718 10/21/21  0932   SODIUM mmol/L 136 134* 134* 137   POTASSIUM mmol/L 4 0 3 9 3 4* 3 6   CHLORIDE mmol/L 102 101 100 104   CO2 mmol/L 28 26 25 21   ANION GAP mmol/L 6 7 9 12   BUN mg/dL 16 36* 55* 92*   CREATININE mg/dL 2 90* 4 77* 3 86* 5 29*   EGFR ml/min/1 73sq m 15 8 11 7   CALCIUM mg/dL 8 4 8 3 8 2* 7 9*   PHOSPHORUS mg/dL  --  5 9*  --   --      Results from last 7 days   Lab Units 10/27/21  0555 10/26/21  0844 10/22/21  0718 10/21/21  0932   GLUCOSE RANDOM mg/dL 88 106 85 87       Medications:   Scheduled Medications:  apixaban, 2 5 mg, Oral, BID  atorvastatin, 40 mg, Oral, HS  calcitriol, 0 25 mcg, Oral, Daily  cholecalciferol, 800 Units, Oral, Daily  citalopram, 20 mg, Oral, Daily  docusate sodium, 100 mg, Oral, BID  levothyroxine, 75 mcg, Oral, Daily  melatonin, 3 mg, Oral, HS  metoprolol tartrate, 25 mg, Oral, BID  pantoprazole, 40 mg, Oral, BID AC  saccharomyces boulardii, 250 mg, Oral, Daily  senna, 1 tablet, Oral, Daily     PRN Meds:  acetaminophen, 650 mg, Oral, Q6H PRN  aluminum-magnesium hydroxide-simethicone, 30 mL, Oral, Q6H PRN  metoprolol, 2 5 mg, Intravenous, Q6H PRN  ondansetron, 4 mg, Intravenous, Q6H PRN        Discharge Plan: TBD    Network Utilization Review Department  ATTENTION: Please call with any questions or concerns to [phone] and carefully listen to the prompts so that you are directed to the right person  All voicemails are confidential   [facility] all requests for admission clinical reviews, approved or denied determinations and any other requests to dedicated fax number below belonging to the campus where the patient is receiving treatment   List of dedicated fax numbers for the Facilities:  [address] DENIALS (Administrative/Medical Necessity) [phone]   [address] (Maternity/NICU/Pediatrics) [phone]   [address]  [address] [address] [place] [address] [address] [facility] [facility] [phone]

## 2021-10-27 NOTE — SEDATION DOCUMENTATION
Left upper Arm AV Fistulagram completed by Dr Rebekah Carrasco and Dr Martin Shay  Several stenotic areas treated with PTA  Tolerated procedure well  Denies pain at this time  Report called to Jade Olvera RN

## 2021-10-27 NOTE — BRIEF OP NOTE (RAD/CATH)
INTERVENTIONAL RADIOLOGY PROCEDURE NOTE    Date: 10/27/2021    Procedure: Fistulagram    Preoperative diagnosis:   1  Renal hematoma    2  Sepsis ([facility] )    3  Pleural effusion    4  Iron deficiency anemia due to chronic blood loss    5  Intra-abdominal collection    6  Atrial fibrillation ([facility] [address] )    7  Melena         Postoperative diagnosis: Same  Surgeon: Angelique Benitez MD     Assistant: None  No qualified resident was available  Blood loss: Less than 25 ml    Specimens: none     Findings: Recurrent stenoses in the mid fistula and subclavian vein treated with a 10 mm high pressure balloon with resolution of the peripheral stenosis  The is a 50 % residual subclavian stenosis  This will likely need a stent but a PICC line is present at this time and do not wish to sacrifice the access  Complications: None immediate      Anesthesia: conscious sedation and local

## 2021-10-27 NOTE — ASSESSMENT & PLAN NOTE
· Left renal hematoma:   · Presented with left renal hematoma  Pigtail catheter is noted medial to kidney  Renal pelvis may be collapsed  Hemorrhage and thickening extending in the combined interfascial place of retrospective as well as some high density fluid within  · S/p IR drainage 10/12/21 with JOSE ANTONIO drain placement x 2    · Output appears to be 30mL from both drains over 24 hour period    · Renal abscess drain repositioned  · Anterior tube removed  · Recheck Ct scan to evaluate for resolution of abscess

## 2021-10-27 NOTE — SEDATION DOCUMENTATION
Left Perinephric drain tube check x 2 completed by Dr Bernarda Hart  1 drain tube removed  Other tube to remain intact as per Dr Bernarda Hart  Right upper arm AV Fistulagram commencing at this time  Dr Lakeshia James performing with Dr Bernarda Hart

## 2021-10-27 NOTE — BRIEF OP NOTE (RAD/CATH)
INTERVENTIONAL RADIOLOGY PROCEDURE NOTE    Date: 10/27/2021    Procedure: Drainage catheter check    Preoperative diagnosis:   1  Renal hematoma    2  Sepsis ([facility] [address] )    3  Pleural effusion    4  Iron deficiency anemia due to chronic blood loss    5  Intra-abdominal collection    6  Atrial fibrillation ([facility] )    7  Melena         Postoperative diagnosis: Same  Surgeon: Raffi Rose MD     Assistant: None  No qualified resident was available  Blood loss: none    Specimens: none     Findings: Perinephric drain exchanged and repositioned into residual cavity  Anterior drain was completely dislodged and unable to re access  Consider a CT scan to evaluate for residual fluid requiring placement of a drain  Complications: None immediate      Anesthesia: conscious sedation and local

## 2021-10-27 NOTE — PROGRESS NOTES
[address] [facility]  Progress Note - Andrea Kong [birth year], 77 y o  female MRN: [id number]  Unit/Bed#: [facility] 929-01 Encounter: [id number]  Primary Care Provider: Dominga Sal MD   Date and time admitted to hospital: 10/9/2021  2:19 PM    * Sepsis on admission  Assessment & Plan  · Previously with fever coupled with tachycardia and elevated procalcitonin  · Likely secondary to infected left renal hematoma -> Fusobacterium bacteremia noted - fluid culture from 10/13 s/p IR-guided drainage growing Enterobacter/Enterococcus  · Off ABX at this time  · Monitor closely    Bacteremia  Assessment & Plan  · Blood cultures from 10/9 growing Fusobacterium - IV Zosyn transitioned to PO Flagyl per Infectious Disease  · Repeat blood cultures 10/12 are negative    Renal hematoma, left  Assessment & Plan  · Left renal hematoma:   · Presented with left renal hematoma  Pigtail catheter is noted medial to kidney  Renal pelvis may be collapsed  Hemorrhage and thickening extending in the combined interfascial place of retrospective as well as some high density fluid within  · S/p IR drainage 10/12/21 with JOSE ANTONIO drain placement x 2    · Output appears to be 30mL from both drains over 24 hour period    · Renal abscess drain repositioned  · Anterior tube removed  · Recheck Ct scan to evaluate for resolution of abscess          Melena  Assessment & Plan  underwent endoscopy without evidence of acute bleeding  Monitor hemoglobin  Hb has been stable    Acute renal failure superimposed on CKD stage 5  Assessment & Plan  · Progressively worsening renal failure  · Dialysis per nephrology  · Nephrology input noted    Anemia of chronic disease  Assessment & Plan  Monitor hemoglobin    Essential hypertension  Assessment & Plan  Monitor blood pressures  Avoid hypotension    History of pulmonary embolism  Assessment & Plan  Eliquis for anticoagulation        VTE Pharmacologic Prophylaxis:   Pharmacologic: Apixaban (Eliquis)  Mechanical VTE Prophylaxis in Place: Yes    Patient Centered Rounds: I have performed bedside rounds with nursing staff today  Education and Discussions with Family / Patient: patient's daughter, plan of care    Time Spent for Care: 20 minutes  More than 50% of total time spent on counseling and coordination of care as described above  Current Length of Stay: 18 day(s)    Current Patient Status: Inpatient   Certification Statement: The patient will continue to require additional inpatient hospital stay due to repeat CT scan    Discharge Plan: TBD    Code Status: Level 1 - Full Code      Subjective:   Denies any acute complaints  Pt is partially sedated from her procedure  Awakens but falls quickly asleep    Objective:     Vitals:   Temp (24hrs), Av 8 °F (36 6 °C), Min:97 1 °F (36 2 °C), Max:98 7 °F (37 1 °C)    Temp:  [97 1 °F (36 2 °C)-98 7 °F (37 1 °C)] 97 1 °F (36 2 °C)  HR:  [64-81] 75  Resp:  [12-20] 17  BP: ()/(55-72) 118/70  SpO2:  [92 %-100 %] 100 %  Body mass index is 37 11 kg/m²  Input and Output Summary (last 24 hours): Intake/Output Summary (Last 24 hours) at 10/27/2021 1659  Last data filed at 10/27/2021 1300  Gross per 24 hour   Intake 0 ml   Output 300 ml   Net -300 ml       Physical Exam:     Physical Exam  Constitutional:       Appearance: Normal appearance  She is obese  HENT:      Head: Normocephalic and atraumatic  Eyes:      Extraocular Movements: Extraocular movements intact  Cardiovascular:      Rate and Rhythm: Normal rate and regular rhythm  Pulmonary:      Effort: Pulmonary effort is normal  No respiratory distress  Breath sounds: No wheezing or rales  Abdominal:      General: There is no distension  Palpations: Abdomen is soft  Musculoskeletal:      Comments: RUE edema   Neurological:      Mental Status: She is alert     Psychiatric:         Mood and Affect: Mood normal          Behavior: Behavior normal            Additional Data: Labs:    Results from last 7 days   Lab Units 10/27/21  0555 10/22/21  0717   WBC Thousand/uL 4 77 10 11   HEMOGLOBIN g/dL 7 2* 7 5*   HEMATOCRIT % 24 3* 24 1*   PLATELETS Thousands/uL 151 79*   NEUTROS PCT %  --  83*   LYMPHS PCT %  --  7*   MONOS PCT %  --  8   EOS PCT %  --  0     Results from last 7 days   Lab Units 10/27/21  0555 10/22/21  0718   SODIUM mmol/L 136 134*   POTASSIUM mmol/L 4 0 3 4*   CHLORIDE mmol/L 102 100   CO2 mmol/L 28 25   BUN mg/dL 16 55*   CREATININE mg/dL 2 90* 3 86*   ANION GAP mmol/L 6 9   CALCIUM mg/dL 8 4 8 2*   ALBUMIN g/dL  --  1 5*   TOTAL BILIRUBIN mg/dL  --  1 08*   ALK PHOS U/L  --  206*   ALT U/L  --  <6*   AST U/L  --  51*   GLUCOSE RANDOM mg/dL 88 85         Results from last 7 days   Lab Units 10/23/21  0757   POC GLUCOSE mg/dl 113         Results from last 7 days   Lab Units 10/23/21  0618 10/22/21  0718   PROCALCITONIN ng/ml 29 16* 36 08*           * I Have Reviewed All Lab Data Listed Above  * Additional Pertinent Lab Tests Reviewed:  All Labs Within Last 24 Hours Reviewed      Recent Cultures (last 7 days):           Last 24 Hours Medication List:   Current Facility-Administered Medications   Medication Dose Route Frequency Provider Last Rate    acetaminophen  650 mg Oral Q6H PRN Salina Bamberger, MD      aluminum-magnesium hydroxide-simethicone  30 mL Oral Q6H PRN Salina Bamberger, MD      apixaban  2 5 mg Oral BID Porfirio Hugo MD      atorvastatin  40 mg Oral HS Salina Bamberger, MD      calcitriol  0 25 mcg Oral Daily Salina Bamberger, MD      cholecalciferol  800 Units Oral Daily Salina Bamberger, MD      citalopram  20 mg Oral Daily Salina Bamberger, MD      docusate sodium  100 mg Oral BID Salina Bamberger, MD      levothyroxine  75 mcg Oral Daily Salina Bamberger, MD      melatonin  3 mg Oral HS Salina Bamberger, MD      metoprolol  2 5 mg Intravenous Q6H PRN Jay Schwab PA-C      metoprolol tartrate  25 mg Oral BID Jay Schwab PA-C  ondansetron  4 mg Intravenous Q6H PRN Gela Muller MD      pantoprazole  40 mg Oral BID LAURO Laguerre DO      saccharomyces boulardii  250 mg Oral Daily Gela Muller MD      senna  1 tablet Oral Daily Gela Muller MD          Today, Patient Was Seen By: Ismael Hartman MD    ** Please Note: Dictation voice to text software may have been used in the creation of this document   **

## 2021-10-27 NOTE — PHYSICAL THERAPY NOTE
Physical Therapy Cancellation Note    Patient's Name: Malick Barba     10/27/21 1100   PT Last Visit   PT Visit Date 10/27/21   Note Type   Note Type Treatment   Cancel Reasons Patient off floor/test     Attempted to see pt for PT session  Pt off the floor at IR  Will continue to follow for PT as appropriate      Vianey Hernández, PT, DPT

## 2021-10-28 ENCOUNTER — APPOINTMENT (INPATIENT)
Dept: DIALYSIS | Facility: HOSPITAL | Age: 76
DRG: 981 | End: 2021-10-28
Payer: COMMERCIAL

## 2021-10-28 PROBLEM — N15.1 PERINEPHRIC ABSCESS: Status: ACTIVE | Noted: 2021-10-09

## 2021-10-28 PROBLEM — K92.1 MELENA: Status: RESOLVED | Noted: 2021-10-18 | Resolved: 2021-10-28

## 2021-10-28 PROBLEM — S37.012A RENAL HEMATOMA, LEFT: Status: RESOLVED | Noted: 2021-10-09 | Resolved: 2021-10-28

## 2021-10-28 LAB
INR PPP: 1.5 (ref 0.84–1.19)
PROTHROMBIN TIME: 17.5 SECONDS (ref 11.6–14.5)

## 2021-10-28 PROCEDURE — 99232 SBSQ HOSP IP/OBS MODERATE 35: CPT | Performed by: STUDENT IN AN ORGANIZED HEALTH CARE EDUCATION/TRAINING PROGRAM

## 2021-10-28 PROCEDURE — 99233 SBSQ HOSP IP/OBS HIGH 50: CPT | Performed by: INTERNAL MEDICINE

## 2021-10-28 PROCEDURE — NC001 PR NO CHARGE: Performed by: RADIOLOGY

## 2021-10-28 PROCEDURE — NC001 PR NO CHARGE: Performed by: NURSE PRACTITIONER

## 2021-10-28 PROCEDURE — 85610 PROTHROMBIN TIME: CPT | Performed by: INTERNAL MEDICINE

## 2021-10-28 PROCEDURE — 99232 SBSQ HOSP IP/OBS MODERATE 35: CPT | Performed by: INTERNAL MEDICINE

## 2021-10-28 RX ADMIN — CALCITRIOL 0.25 MCG: 0.25 CAPSULE, LIQUID FILLED ORAL at 13:58

## 2021-10-28 RX ADMIN — APIXABAN 2.5 MG: 2.5 TABLET, FILM COATED ORAL at 13:57

## 2021-10-28 RX ADMIN — ATORVASTATIN CALCIUM 40 MG: 40 TABLET, FILM COATED ORAL at 22:10

## 2021-10-28 RX ADMIN — APIXABAN 2.5 MG: 2.5 TABLET, FILM COATED ORAL at 22:11

## 2021-10-28 RX ADMIN — Medication 250 MG: at 13:57

## 2021-10-28 RX ADMIN — MELATONIN TAB 3 MG 3 MG: 3 TAB at 22:10

## 2021-10-28 RX ADMIN — STANDARDIZED SENNA CONCENTRATE 8.6 MG: 8.6 TABLET ORAL at 13:57

## 2021-10-28 RX ADMIN — DOCUSATE SODIUM 100 MG: 100 CAPSULE, LIQUID FILLED ORAL at 22:17

## 2021-10-28 RX ADMIN — DOCUSATE SODIUM 100 MG: 100 CAPSULE, LIQUID FILLED ORAL at 13:59

## 2021-10-28 RX ADMIN — PIPERACILLIN AND TAZOBACTAM 2.25 G: 2; .25 INJECTION, POWDER, FOR SOLUTION INTRAVENOUS at 22:02

## 2021-10-28 RX ADMIN — PANTOPRAZOLE SODIUM 40 MG: 40 TABLET, DELAYED RELEASE ORAL at 06:11

## 2021-10-28 RX ADMIN — LEVOTHYROXINE SODIUM 75 MCG: 75 TABLET ORAL at 13:57

## 2021-10-28 RX ADMIN — CITALOPRAM HYDROBROMIDE 20 MG: 20 TABLET ORAL at 13:57

## 2021-10-28 RX ADMIN — PANTOPRAZOLE SODIUM 40 MG: 40 TABLET, DELAYED RELEASE ORAL at 18:23

## 2021-10-28 NOTE — PLAN OF CARE
Problem: MOBILITY - ADULT  Goal: Maintain or return to baseline ADL function  Description: INTERVENTIONS:  -  Assess patient's ability to carry out ADLs; assess patient's baseline for ADL function and identify physical deficits which impact ability to perform ADLs (bathing, care of mouth/teeth, toileting, grooming, dressing, etc )  - Assess/evaluate cause of self-care deficits   - Assess range of motion  - Assess patient's mobility; develop plan if impaired  - Assess patient's need for assistive devices and provide as appropriate  - Encourage maximum independence but intervene and supervise when necessary  - Involve family in performance of ADLs  - Assess for home care needs following discharge   - Consider OT consult to assist with ADL evaluation and planning for discharge  - Provide patient education as appropriate  Outcome: Progressing  Goal: Maintains/Returns to pre admission functional level  Description: INTERVENTIONS:  - Perform BMAT or MOVE assessment daily    - Set and communicate daily mobility goal to care team and patient/family/caregiver  - Collaborate with rehabilitation services on mobility goals if consulted  - Reposition patient every 2 hours    - Record patient progress and toleration of activity level   Outcome: Progressing     Problem: Potential for Falls  Goal: Patient will remain free of falls  Description: INTERVENTIONS:  - Educate patient/family on patient safety including physical limitations  - Instruct patient to call for assistance with activity   - Consult OT/PT to assist with strengthening/mobility   - Keep Call bell within reach  - Keep bed low and locked with side rails adjusted as appropriate  - Keep care items and personal belongings within reach  - Initiate and maintain comfort rounds  - Make Fall Risk Sign visible to staff  - Offer Toileting every 2 Hours, in advance of need  - Initiate/Maintain alarm  - Obtain necessary fall risk management equipment:   - Apply yellow socks and bracelet for high fall risk patients  - Consider moving patient to room near nurses station  Outcome: Progressing     Problem: Prexisting or High Potential for Compromised Skin Integrity  Goal: Skin integrity is maintained or improved  Description: INTERVENTIONS:  - Identify patients at risk for skin breakdown  - Assess and monitor skin integrity  - Assess and monitor nutrition and hydration status  - Monitor labs   - Assess for incontinence   - Turn and reposition patient  - Assist with mobility/ambulation  - Relieve pressure over bony prominences  - Avoid friction and shearing  - Provide appropriate hygiene as needed including keeping skin clean and dry  - Evaluate need for skin moisturizer/barrier cream  - Collaborate with interdisciplinary team   - Patient/family teaching  - Consider wound care consult   Outcome: Progressing     Problem: Nutrition/Hydration-ADULT  Goal: Nutrient/Hydration intake appropriate for improving, restoring or maintaining nutritional needs  Description: Monitor and assess patient's nutrition/hydration status for malnutrition  Collaborate with interdisciplinary team and initiate plan and interventions as ordered  Monitor patient's weight and dietary intake as ordered or per policy  Utilize nutrition screening tool and intervene as necessary  Determine patient's food preferences and provide high-protein, high-caloric foods as appropriate       INTERVENTIONS:  - Monitor oral intake, urinary output, labs, and treatment plans  - Assess nutrition and hydration status and recommend course of action  - Evaluate amount of meals eaten  - Assist patient with eating if necessary   - Allow adequate time for meals  - Recommend/ encourage appropriate diets, oral nutritional supplements, and vitamin/mineral supplements  - Order, calculate, and assess calorie counts as needed  - Recommend, monitor, and adjust tube feedings and TPN/PPN based on assessed needs  - Assess need for intravenous fluids  - Provide specific nutrition/hydration education as appropriate  - Include patient/family/caregiver in decisions related to nutrition  Outcome: Progressing     Problem: METABOLIC, FLUID AND ELECTROLYTES - ADULT  Goal: Electrolytes maintained within normal limits  Description: INTERVENTIONS:  - Monitor labs and assess patient for signs and symptoms of electrolyte imbalances  - Administer electrolyte replacement as ordered  - Monitor response to electrolyte replacements, including repeat lab results as appropriate  - Instruct patient on fluid and nutrition as appropriate  Outcome: Progressing  Goal: Fluid balance maintained  Description: INTERVENTIONS:  - Monitor labs   - Monitor I/O and WT  - Instruct patient on fluid and nutrition as appropriate  - Assess for signs & symptoms of volume excess or deficit  Outcome: Progressing

## 2021-10-28 NOTE — WOUND OSTOMY CARE
Progress Note - Wound   Ralph Drake 77 y o  female MRN: [id number]  [facility] Encounter: [id number]        Assessment:   Patient seen this morning for continued skin and wound care, she is in bed somnolent with no complaints, agreeable for wound care nurse to re-assess skin  Findings:  1-Chest skin tear healed and presenting as hypopigmented scar tissue  2-L arm skin tear is stably healed with lifting scab, no erythema or drainage  Sacrum, buttocks and heels remains intact, R arm surgical incision post fistula creation appears stable, non draining or erythemic, both arms are edematous          Skin care plans:  1-Hydraguard to bilateral sacrum, buttock TID and PRN  2-Elevate heels to offload pressure  3-Ehob cushion in chair when out of bed  4-Moisturize skin daily with skin nourishing cream   5-Turn/reposition q2h or when medically stable for pressure re-distribution on skin  6-Allevyn foam to heels, amado w/P, peel foam check skin integrity q-shift  Change q3d    Vitals: Blood pressure 107/65, pulse (!) 117, temperature 98 3 °F (36 8 °C), temperature source Axillary, resp  rate 18, height 5' (1 524 m), weight 86 2 kg (190 lb), SpO2 100 %, not currently breastfeeding  ,Body mass index is 37 11 kg/m²  Our skin care recommendations remains as nursing orders, wound care is signing off, please call or tiger text with questions and concerns        Jeronimo Powell RN, BSN, [employer]

## 2021-10-28 NOTE — PROGRESS NOTES
Progress Note - Infectious Disease   Zoya Wong 77 y o  female MRN: [id number]  Unit/Bed#: [place] 929-01 Encounter: [id number]      Impression/Plan:  1  Sepsis, POA   Patient presented with progressing flank/abdominal discomfort likely related to problem 3  Clinical parameters had improved  Overall poor progress likely due to 4  Later today noted to have tachycardia along with borderline blood pressures  Will restart antibiotics as below  Continue to trend fever curve/WBC  Ongoing follow-up by Nephrology  Follow-up IR cultures  Additional supportive care as per primary     2  Fusobacterium bacteremia   Source unknown, possibly transient given 1 or occult GI source  Atypical pathogen to be isolated and cause urinary tract infection   CT imaging of the abdomen on admission unremarkable  Repeat imaging with new collections noted in the abdomen and partial SBO, potential source   Drains placed   Patient without any teeth   Repeat blood cultures without growth   Not isolated on fluid cultures  No prior C-scope on chart review   EGD unremarkable   Transaminitis as below   Imaging of the right upper quadrant ultrasound unremarkable   Completed empiric course of Flagyl  Restart antibiotics as below  Continue to trend fever curve/WBC  Ongoing follow-up by GI  Consider C scope once more stable/outpatient  Supportive care as per primary     3  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   Patient has a chronic obstructive uropathy involving the left side and recently had PCN removal   Subsequently developed bleeding at the site and hematoma on imaging likely due to chronic anticoagulation   IR aspiration and drain placement on 10/13 and earlier urine cultures with same organisms  Drains likely provided significant source control  IR drain check yesterday noted 1 tube dislodged  Repeat CT scan with persistent collections, uncertain if these are sterile    Blood pressure and tachycardia noted later today   Will restart the patient on Zosyn based on prior culture  Ongoing follow-up by IR  Will follow-up IR cultures when collected  Continue To trend fever curve/vitals  Repeat labs tomorrow  Supportive care as per primary     4  LUANN on CKD with fistula  Patient with baseline chronic kidney disease with fistula created in the right upper extremity   Stenosis causing swelling, improving  Arlyce Baston function worsening over entire admission   Will attempt to avoid certain antibiotics   Some subjective improvement with dialysis initiation    Fistulogram noted  Repeat BMP  Ongoing follow-up by Nephrology  Vascular surgery follow-up/imaging     5  History of C diff and diarrhea   Patient had a history of C diff in 2019  She has had repeat PCRs in July 2021 and now negative despite recent diarrhea   Likely related to 9  Patient reports no diarrhea today  Antibiotics restarted as above  Monitor stool output  No C diff prophylaxis for now     6  Polycythemia vera and MDS   Ongoing management/supportive care as per primary   Recommend follow-up with Oncology as outpatient      7  Prior PE on anticoagulation   Likely risk factor for issues as above  AC as per primary        8  Transaminitis   Patient noted to have slowly increasing alkaline phosphatase and AST   In the setting of this bacteremia consider occult abscess  Now downtrending/stable   Liver/RUQ ultrasound unremarkable  Monitor LFTs  Antibiotics restarted as above  GI evaluation noted     9  Melena   Possible upper GI bleed   Ongoing follow-up and workup as per GI   EGD reportedly unremarkable   Additional care as per primary       Above plan discussed later today with primary service attending      ID consult service will continue to follow      Antibiotics:  Off systemic antibiotics day 3    Subjective:  Patient seen much earlier today during dialysis  She continued to report feeling fatigued    Denied having any nausea, vomiting, chest pain or shortness of breath  Denied having any overt abdominal discomfort  Reviewed with her plans to restart antibiotic after drainage today  Chart reviewed later today and patient has yet to have drainage  She is noted however to have tachycardia along with hypotension  Objective:  Vitals:  Temp:  [97 3 °F (36 3 °C)-99 3 °F (37 4 °C)] 97 6 °F (36 4 °C)  HR:  [] 117  Resp:  [17-18] 18  BP: ()/(49-65) 93/61  SpO2:  [100 %] 100 %  Temp (24hrs), Av 1 °F (36 7 °C), Min:97 3 °F (36 3 °C), Max:99 3 °F (37 4 °C)  Current: Temperature: 97 6 °F (36 4 °C)    Physical Exam:   General Appearance:  Chronically ill-appearing, fatigued, flat/depressed affect  , frail  Throat: Oropharynx moist without lesions  Lungs:   Clear to auscultation bilaterally; no wheezes, rhonchi or rales; respirations unlabored on room air   Heart:  RRR; no murmur, rub or gallop appreciated   Abdomen:   Soft, non-tender, non-distended, positive bowel sounds  Extremities: No clubbing, cyanosis:  Continues with anasarca in all of her extremities  Skin: No new rashes or lesions  No new draining wounds noted         Labs, Imaging, & Other studies:   All pertinent labs and imaging studies were personally reviewed  Results from last 7 days   Lab Units 10/27/21  0555 10/22/21  0717   WBC Thousand/uL 4 77 10 11   HEMOGLOBIN g/dL 7 2* 7 5*   PLATELETS Thousands/uL 151 79*     Results from last 7 days   Lab Units 10/27/21  0555 10/22/21  0718   POTASSIUM mmol/L 4 0 3 4*   CHLORIDE mmol/L 102 100   CO2 mmol/L 28 25   BUN mg/dL 16 55*   CREATININE mg/dL 2 90* 3 86*   EGFR ml/min/1 73sq m 15 11   CALCIUM mg/dL 8 4 8 2*   AST U/L  --  51*   ALT U/L  --  <6*   ALK PHOS U/L  --  206*

## 2021-10-28 NOTE — PHYSICAL THERAPY NOTE
Physical Therapy Cancellation Note    The pt  Was attempted this morning, but she is currently at hemodialysis  Will re-attempt later as able      Selestine Shone, PTA

## 2021-10-28 NOTE — ASSESSMENT & PLAN NOTE
· Presented with left renal hematoma  Pigtail catheter is noted medial to kidney  Renal pelvis may be collapsed  Hemorrhage and thickening extending in the combined interfascial place of retrospective as well as some high density fluid within  · S/p IR drainage 10/12/21 with JOSE ANTONIO drain placement x 2    · Output appears to be 30mL from both drains over 24 hour period    · Renal abscess drain repositioned  · Anterior tube removed  · Repeat CT scan with persistent abscess, IR to replace drain and send fluid for cultures

## 2021-10-28 NOTE — PLAN OF CARE
Post-Dialysis RN Treatment Note    Blood Pressure:  Pre 96/63 mm/Hg  Post 107/65 mmHg   EDW  TBD kg    Weight:  Pre 85 kg   Post 84 7 kg   Mode of weight measurement: Bed Scale   Volume Removed  501 ml    Treatment duration 210 minutes    NS given  YES 100cc for hypotension   Treatment shortened?  No   Medications given during Rx None Reported   Estimated Kt/V  1 55   Access type: Permacath/TDC   Access Status: Yes, describe:     Report called to primary nurse   Yes Steven Duverney RN  Plan 3 5 hrs of hemodialysis with an UF of 1 kg  Problem: METABOLIC, FLUID AND ELECTROLYTES - ADULT  Goal: Electrolytes maintained within normal limits  Description: INTERVENTIONS:  - Monitor labs and assess patient for signs and symptoms of electrolyte imbalances  - Administer electrolyte replacement as ordered  - Monitor response to electrolyte replacements, including repeat lab results as appropriate  - Instruct patient on fluid and nutrition as appropriate  Outcome: Progressing  Goal: Fluid balance maintained  Description: INTERVENTIONS:  - Monitor labs   - Monitor I/O and WT  - Instruct patient on fluid and nutrition as appropriate  - Assess for signs & symptoms of volume excess or deficit  Outcome: Progressing

## 2021-10-28 NOTE — PROGRESS NOTES
NEPHROLOGY PROGRESS NOTE   Andrea Kong 77 y o  female MRN: [id number]  [facility] Encounter: [id number]  Reason for Consult: LUANN    ASSESSMENT AND PLAN:  76 yo woman PMH of Hx of obstructive uropathy, bilateral hydronephrosis  status post nephrostomy tube placement 05/11/2021 and status postleft percutaneous/retrograde percutaneous nephrostomy placement on 09/20/2021, ileal conduit 2016, polycythemia vera, HTN, CKD5 p/w abdominal pain was found to have hematoma of the left kidney complicated with sepsis with worsening kidney function  Tomás started HD on 10/20/21 via PC     PLAN:     # Oliguric LUANN and CKD G5 now HD dependent TTS  · Dialysis unit/days:  New start on 10/20/21  · Access:  Left IJ PermCath, has a right AVF s/p  vein transposition on 9/30  · Plan to continue HD as scheduled  · Renal Diet  · Fluid restriction 1-1 5L/d  · Adjust medications to GFR<10  · Avoid opioids   · Will need HD unit on discharge  · Daily weight     #Volume status/hypertension:  · Volume:  Euvolemic  · Blood pressure:  Normotensive this morning,107/65  · On metoprolol 25 mg b i d      #Secondary Hyperparasitoidism   · DTEY 051 5  ZCFBBO   · Hypovitaminosis D as  of PTH  · Continue calcitriol 0 25 mcg  · Low phosphorus diet     #Hypovitaminosis D  · 250  OH 15 2 ng/mL  · On vitamin-D 800 units     # multifactorial Anemia   · Secondary to kidney disease  · Current hemoglobin:  7 2 mg/dL 10/27  · Treatment:  · No indication of JENELLE at this time due to history of PE  · Transfuse for hemoglobin less than 7 0 per primary service        #vascular access  · Currently left PermCath  · Right AV fistula s/p vein transposition 9/30 not get ready to be used  · Fistulogram:recurring stenosis in mid fistula and subclavian    · Request vascular evaluation of R AVF (vascular team informed)      # sepsis  · Infected left renal hematoma  · S/p metronidazole 500 mg t i d   · Id following    SUBJECTIVE:  Patient seen at this morning  Patient had fistulogram yesterday showed recurrent stenosis of the mid fistula and subclavian vein, treated with balloon dilatation    Last HD this morning well-tolerated     OBJECTIVE:  Current Weight: Weight - Scale: 86 2 kg (190 lb)  Vitals:    10/28/21 1140   BP: 107/65   Pulse: (!) 117   Resp: 18   Temp: 98 3 °F (36 8 °C)   SpO2:        Intake/Output Summary (Last 24 hours) at 10/28/2021 1309  Last data filed at 10/28/2021 1140  Gross per 24 hour   Intake 500 ml   Output 1151 ml   Net -651 ml     Wt Readings from Last 3 Encounters:   10/15/21 86 2 kg (190 lb)   09/30/21 83 9 kg (185 lb)   09/22/21 84 7 kg (186 lb 12 8 oz)     Temp Readings from Last 3 Encounters:   10/28/21 98 3 °F (36 8 °C) (Axillary)   10/05/21 98 4 °F (36 9 °C) (Oral)   09/29/21 (!) 97 °F (36 1 °C) (Temporal)     BP Readings from Last 3 Encounters:   10/28/21 107/65   10/07/21 139/76   10/05/21 131/66     Pulse Readings from Last 3 Encounters:   10/28/21 (!) 117   10/07/21 76   10/05/21 80        General:  no acute distress at this time  Skin:  No acute rash  Eyes:  No scleral icterus and noninjected  ENT: mucous membranes moist  Neck: no carotid bruits, 1+ carotid upstroke  Back:  No CVA tenderness  Chest:  Clear to auscultation percussion, good respiratory effort, no use of accessory respiratory muscles  CVS:  Regular rate and rhythm without a murmur rub   Abdomen:  soft and nontender normal bowel sounds   Extremities:  Left IJ PC, right AVF with prominent veins on right shoulder  Neuro:  No gross focality  Psych:  Cooperative   Vascular access: R AVF, L IJ PermCath    Medications:    Current Facility-Administered Medications:     acetaminophen (TYLENOL) tablet 650 mg, 650 mg, Oral, Q6H PRN, Te Tejada MD, 650 mg at 10/21/21 1215    aluminum-magnesium hydroxide-simethicone (MYLANTA) oral suspension 30 mL, 30 mL, Oral, Q6H PRN, Te Tejada MD    apixaban (ELIQUIS) tablet 2 5 mg, 2 5 mg, Oral, BID, Madina Carmichael MD, 2 5 mg at 10/27/21 1906    atorvastatin (LIPITOR) tablet 40 mg, 40 mg, Oral, HS, Jarrod Masters MD, 40 mg at 10/27/21 2230    calcitriol (ROCALTROL) capsule 0 25 mcg, 0 25 mcg, Oral, Daily, Jarrod Masters MD, 0 25 mcg at 10/27/21 0651    cholecalciferol (VITAMIN D) oral liquid 800 Units, 800 Units, Oral, Daily, Jarrod Masters MD, 800 Units at 10/27/21 6532    citalopram (CeleXA) tablet 20 mg, 20 mg, Oral, Daily, Jarrod Masters MD, 20 mg at 10/27/21 3893    docusate sodium (COLACE) capsule 100 mg, 100 mg, Oral, BID, Jarrod Masters MD, 100 mg at 10/27/21 1906    levothyroxine tablet 75 mcg, 75 mcg, Oral, Daily, Jarrod Masters MD, 75 mcg at 10/27/21 0921    melatonin tablet 3 mg, 3 mg, Oral, HS, Jarrod Masters MD, 3 mg at 10/27/21 2230    metoprolol (LOPRESSOR) injection 2 5 mg, 2 5 mg, Intravenous, Q6H PRN, Paulo Avina PA-C, 2 5 mg at 10/20/21 1618    metoprolol tartrate (LOPRESSOR) tablet 25 mg, 25 mg, Oral, BID, Paulo Avina PA-C, 25 mg at 10/27/21 1906    ondansetron [facility]) injection 4 mg, 4 mg, Intravenous, Q6H PRN, Jarrod Masters MD, 4 mg at 10/14/21 2153    pantoprazole (PROTONIX) EC tablet 40 mg, 40 mg, Oral, BID AC, Mumtaz Carlotta, DO, 40 mg at 10/28/21 5621    saccharomyces boulardii (FLORASTOR) capsule 250 mg, 250 mg, Oral, Daily, Jarrod Masters MD, 250 mg at 10/27/21 4377    senna (SENOKOT) tablet 8 6 mg, 1 tablet, Oral, Daily, Jarrod Masters MD, 8 6 mg at 10/25/21 0920    Laboratory Results:  Results from last 7 days   Lab Units 10/27/21  0555 10/26/21  0844 10/22/21  0718 10/22/21  0717   WBC Thousand/uL 4 77  --   --  10 11   HEMOGLOBIN g/dL 7 2*  --   --  7 5*   HEMATOCRIT % 24 3*  --   --  24 1*   PLATELETS Thousands/uL 151  --   --  79*   SODIUM mmol/L 136 134* 134*  --    POTASSIUM mmol/L 4 0 3 9 3 4*  --    CHLORIDE mmol/L 102 101 100  --    CO2 mmol/L 28 26 25  --    BUN mg/dL 16 36* 55*  --    CREATININE mg/dL 2 90* 4 77* 3 86*  --    CALCIUM mg/dL 8 4 8 3 8 2*  --    PHOSPHORUS mg/dL  --  5 9*  --   --        CT abdomen pelvis wo contrast   Final Result by Gennaro Fowler MD (10/27 1839)      Persistent left perinephric collection with a surrounding rind suspicious for perinephric abscess  Resolved left paracolic gutter collection; a pigtail catheter is in place  Thickened rind surrounding a pelvic posterior cul-de-sac collection  Unchanged thin-walled right iliac fossa collection  The study was marked in [facility] for immediate notification  Workstation performed: [id number]         IR AV fistulagram/graftogram   Final Result by Jeff Deras MD (10/27 1722)   Impression:       Recurrent subclavian vein stenosis dilated with 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 with 50% residual narrowing  The patient may require a stent in the future after removal of the central venous catheter  Successful angioplasty of a mid basilic vein stenosis  Workstation performed: [id number]         IR drainage tube check/change/reposition/reinsertion/upsize   Final Result by Jeff Deras MD (10/27 1712)   Impression:       Successful exchange of the perinephric drain  The more anterior drain was dislodged and tract was occluded  Workstation performed: Zamzam Ken right upper quadrant   Final Result by Ashleigh Bell MD (10/22 7404)      Nonobstructive right renal calculi  Otherwise unremarkable sonographic study  Please note CT of 10/13/2021 demonstrated a left kidney subcapsular collection and left abdominal and right lower quadrant collection  Workstation performed: [id number]         IR tunneled dialysis catheter placement   Final Result by Jeff Deras MD (10/21 9981)   Impression:      Successful placement of a left internal jugular vein 32 cm tunneled dialysis catheter              Workstation performed: [id number]         US kidney and bladder   Final Result by Isaak Tong MD (10/20 1047)      1  Mild right upper pole caliectasis, difficult to compare to prior CT, but likely present to some degree on that study  Multiple nonobstructing right renal calculi  2   Difficult visualization of the left kidney due to body habitus  No gross hydronephrosis  Partially visualized left perinephric drainage catheter with likely some residual perinephric collection, noting poor evaluation  3   Unchanged 7 6 cm simple right lower quadrant fluid collection and fluid collection versus free fluid in the cul-de-sac, as seen on prior CT  Workstation performed: [id number]         VAS hemodialysis access duplex right upper limb avf   Final Result by León Davis MD (10/20 2209)      XR chest portable   Final Result by Hung Spring MD (10/18 4441)      Persistent left pleural disease                    Workstation performed: [id number]         IR drainage tube placement   Final Result by Sonia Bearden MD (10/13 2041)   Impression:       Image guided 10 [place] Islander drain placement into a left flank/paracolic gutter collection       Image guided 10 [place] Island[place] drain placement into a left perinephric collection         Workstation performed: [id number]         CT chest abdomen pelvis wo contrast   Final Result by Cadence Gomez MD (10/13 8233)      There is a increasing dilation of the small bowel loops in the upper to mid abdomen with the zone of transition suggest partial small bowel obstruction       New loculated fluid collection in the left paracolic gutter measuring 6 8 x 3 6 x 12 6 cm      Additional loculated fluid collection within the pelvic cul-de-sac, stable   Previously noted the postoperative right iliac fossa collection likely related to suggest lymphocele/seroma, stable      Retrograde the left nephroureteral stent with resolution of the left hydronephrosis      Subcapsular fluid collection left kidney, moderate on previous study      No new collection with the air-fluid level No airspace consolidations lungs   Area of groundglass density seen in the posterior aspect of the left upper lobe may be due to atelectasis/pneumonitis       I personally discussed this study with Corina Hopkins on 10/13/2021 at 2:29 PM                Workstation performed: [id number]         IR IN-Patient Thoracentesis   Final Result by Meño Chris MD (10/12 2214)   Impression:      Ultrasound-guided left thoracentesis      Small effusion which was difficult to aspirate  Workstation performed: [id number]         IR non-tunneled central line placement   Final Result by Meño Chris MD (10/12 2216)   Impression:       Image guided placement of a nontunneled 5 Vietnamese double-lumen power injectable central venous catheter via the right external jugular vein  Workstation performed: [id number]         VAS hemodialysis access duplex right upper limb avf   Final Result by Ga Dong MD (10/11 1031)      CT abdomen pelvis wo contrast   Final Result by Karsten Hartman MD (10/09 1625)      In the left renal fossa, there is a collection of mixed intermediate and high attenuation suggesting a hematoma with some bubbles of gas  Pigtail catheter is noted medial to the kidney  The renal pelvis may be collapsed  There appears to be some    hemorrhage and thickening extending in the combined interfascial plane of the retroperitoneum as well as some high density fluid within the pelvis suggesting hemoperitoneum  Superinfection of the kidney is possible  Urology evaluation is advised  Nonobstructing calculi in the right kidney  Some prominent loops of bowel are more likely large bowel suggesting ileus  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        This was discussed with Dr Neysa Scheuermann at 4:20 PM               Workstation performed: [id number]         XR chest portable   Final Result by Marline Chun MD (10/10 1060)      Medial left base slight atelectasis and or small infiltrate  Small left effusion      The study was marked in [employer] for immediate notification  Workstation performed: [id number]         IR drainage tube placement    (Results Pending)       Portions of the record may have been created with voice recognition software  Occasional wrong word or "sound a like" substitutions may have occurred due to the inherent limitations of voice recognition software  Read the chart carefully and recognize, using context, where substitutions have occurred

## 2021-10-28 NOTE — PROGRESS NOTES
Vascular Surgery  Progress Note    Contacted by NephrologyVaibhav, PAC, regarding ? Use of AVF  Patient is S/P superficialization of ABF on 09/30/2021 by Dr Sintia Chairez  She has a new start dialysis this admission via [address] [place] placed 10/20/2021  Desire is for removal of catheter as soon as possible  Records reviewed:  · Right brachial basilic AV fistula creation 01/05/2021 (Dr Sintia Chairez)  · Superficialization and transposition of right brachiobasilic fistula 9/52/8216 (Dr Sintia Chairez)  · Seen in consultation this admission by Dr Roger Sen 10/10/2021 for right upper extremity swelling  · 10/19/2021 AVF duplex:  --narrowing of subclavian vein at clavicle with elevated PSV and turbulent flow  --AVF matured with adequate diameter, flow volumes, and < 6 mm depth   --antegrade, high resistant blunted flow in the peripheral arteries  -- (-) obstruction/ pseudoaneurysm/ large venous branch  · 10/27/2021 RUE AVF-gram :  Recurrence subclavian vein stenosis dilated with 10 mm balloon with 50% residual narrowing  (Stent may be required in future after removal of central venous catheter ) & successful angioplasty of mid basilic vein stenosis      Pt seen & examined  Pt notes RUE swelling to be slightly improved since previous  Denies Steal symptoms  RUE swelling & pitting edema w/ thinned, shiny skin  R BB AVF w/ interrupted sutures intact  +Thrill/ +Bruit but some pulsatility along upper-lateral aspect of AVF      Prominent venous collaterals of R upper chest wall       -- d/w Dr Sintia Chairez  --attempt to cannulate/ access AVF at next HD  --once Permacath removed, likely will need to pursue Rpt Fgram w/ stenting of SCL vein stenosis      Doug Maharaj PA-C  10/28/2021

## 2021-10-28 NOTE — PROGRESS NOTES
[address] [facility]  Progress Note - Rogelio Anna [birth year], 77 y o  female MRN: [id number]  Unit/Bed#: [facility] 929-01 Encounter: [id number]  Primary Care Provider: Moustapha Woo MD   Date and time admitted to hospital: 10/9/2021  2:19 PM    * Sepsis on admission  Assessment & Plan  · Previously with fever coupled with tachycardia and elevated procalcitonin  · Likely secondary to infected left renal hematoma -> Fusobacterium bacteremia noted - fluid culture from 10/13 s/p IR-guided drainage growing Enterobacter/Enterococcus  · Off ABX at this time  · Monitor closely    Bacteremia  Assessment & Plan  · Blood cultures from 10/9 growing Fusobacterium - IV Zosyn transitioned to PO Flagyl per Infectious Disease  · Repeat blood cultures 10/12 are negative    Perinephric abscess  Assessment & Plan  · Presented with left renal hematoma  Pigtail catheter is noted medial to kidney  Renal pelvis may be collapsed  Hemorrhage and thickening extending in the combined interfascial place of retrospective as well as some high density fluid within  · S/p IR drainage 10/12/21 with JOSE ANTONIO drain placement x 2    · Output appears to be 30mL from both drains over 24 hour period  · Renal abscess drain repositioned  · Anterior tube removed  · Repeat CT scan with persistent abscess, IR to replace drain and send fluid for cultures          Pleural effusion  Assessment & Plan  Mild to moderate pleural effusion on CXR    S/p thoracentesis by IR 10/11/21   -patient currently comfortable on room air, chest x-ray from prior shows persistent left pleural effusion      Acute renal failure superimposed on CKD stage 5  Assessment & Plan  · Progressively worsening renal failure  · Dialysis per nephrology  · Nephrology input noted    Anemia of chronic disease  Assessment & Plan  Monitor hemoglobin    Essential hypertension  Assessment & Plan  Monitor blood pressures  Avoid hypotension    Obstructive uropathy  Assessment & Plan  · Obstructive nephropathy  · Chronic bilateral hydronephrosis stents were recently removed  · Urology and Nephrology following; recommend continuing antibiotics and present treatment      History of pulmonary embolism  Assessment & Plan  Eliquis for anticoagulation        VTE Pharmacologic Prophylaxis:   Pharmacologic: Heparin  Mechanical VTE Prophylaxis in Place: Yes    Patient Centered Rounds: I have performed bedside rounds with nursing staff today  Discussions with Specialists or Other Care Team Provider: JANINE VIERA    Education and Discussions with Family / Patient: patient and daughter, plan of care    Time Spent for Care: 20 minutes  More than 50% of total time spent on counseling and coordination of care as described above  Current Length of Stay: 19 day(s)    Current Patient Status: Inpatient   Certification Statement: The patient will continue to require additional inpatient hospital stay due to replace perinephric drain    Discharge Plan: TBD    Code Status: Level 1 - Full Code      Subjective:   Denies any new complaints  Pain is controlled, denies shortness of breath    Objective:     Vitals:   Temp (24hrs), Av 1 °F (36 7 °C), Min:97 3 °F (36 3 °C), Max:99 3 °F (37 4 °C)    Temp:  [97 3 °F (36 3 °C)-99 3 °F (37 4 °C)] 97 6 °F (36 4 °C)  HR:  [] 117  Resp:  [17-18] 18  BP: ()/(49-65) 89/61  SpO2:  [100 %] 100 %  Body mass index is 37 11 kg/m²  Input and Output Summary (last 24 hours): Intake/Output Summary (Last 24 hours) at 10/28/2021 1703  Last data filed at 10/28/2021 1140  Gross per 24 hour   Intake 500 ml   Output 1151 ml   Net -651 ml       Physical Exam:     Physical Exam  Constitutional:       Appearance: Normal appearance  She is obese  HENT:      Head: Normocephalic and atraumatic  Mouth/Throat:      Mouth: Mucous membranes are moist       Pharynx: Oropharynx is clear  Eyes:      Extraocular Movements: Extraocular movements intact     Cardiovascular: Rate and Rhythm: Normal rate and regular rhythm  Pulmonary:      Effort: Pulmonary effort is normal       Breath sounds: No wheezing or rales  Abdominal:      General: There is no distension  Palpations: Abdomen is soft  Tenderness: There is no abdominal tenderness  Musculoskeletal:      Comments: RUE edema   Neurological:      General: No focal deficit present  Mental Status: She is alert and oriented to person, place, and time  Psychiatric:         Mood and Affect: Mood normal          Behavior: Behavior normal        CT scan of abdomen reviewed    Additional Data:     Labs:    Results from last 7 days   Lab Units 10/27/21  0555 10/22/21  0717   WBC Thousand/uL 4 77 10 11   HEMOGLOBIN g/dL 7 2* 7 5*   HEMATOCRIT % 24 3* 24 1*   PLATELETS Thousands/uL 151 79*   NEUTROS PCT %  --  83*   LYMPHS PCT %  --  7*   MONOS PCT %  --  8   EOS PCT %  --  0     Results from last 7 days   Lab Units 10/27/21  0555 10/22/21  0718   SODIUM mmol/L 136 134*   POTASSIUM mmol/L 4 0 3 4*   CHLORIDE mmol/L 102 100   CO2 mmol/L 28 25   BUN mg/dL 16 55*   CREATININE mg/dL 2 90* 3 86*   ANION GAP mmol/L 6 9   CALCIUM mg/dL 8 4 8 2*   ALBUMIN g/dL  --  1 5*   TOTAL BILIRUBIN mg/dL  --  1 08*   ALK PHOS U/L  --  206*   ALT U/L  --  <6*   AST U/L  --  51*   GLUCOSE RANDOM mg/dL 88 85     Results from last 7 days   Lab Units 10/28/21  0931   INR  1 50*     Results from last 7 days   Lab Units 10/23/21  0757   POC GLUCOSE mg/dl 113         Results from last 7 days   Lab Units 10/23/21  0618 10/22/21  0718   PROCALCITONIN ng/ml 29 16* 36 08*           * I Have Reviewed All Lab Data Listed Above  * Additional Pertinent Lab Tests Reviewed:  All Labs Within Last 24 Hours Reviewed        Recent Cultures (last 7 days):           Last 24 Hours Medication List:   Current Facility-Administered Medications   Medication Dose Route Frequency Provider Last Rate    acetaminophen  650 mg Oral Q6H PRN MD Mahi Wade aluminum-magnesium hydroxide-simethicone  30 mL Oral Q6H PRN Calude Semen, MD      apixaban  2 5 mg Oral BID Carina Decker, MD      atorvastatin  40 mg Oral HS Vinetta Semen, MD      calcitriol  0 25 mcg Oral Daily Vinetta Semen, MD      cholecalciferol  800 Units Oral Daily Vinetta Semen, MD      citalopram  20 mg Oral Daily Vinetta Semen, MD      docusate sodium  100 mg Oral BID Vinetta Semen, MD      levothyroxine  75 mcg Oral Daily Vinetta Semen, MD      melatonin  3 mg Oral HS Vinetta Semen, MD      metoprolol  2 5 mg Intravenous Q6H PRN Sherolyn Duverney, PA-C      metoprolol tartrate  25 mg Oral BID Sherolyn Duverney, PA-C      ondansetron  4 mg Intravenous Q6H PRN Vinetta Semen, MD      pantoprazole  40 mg Oral BID LAURO Laguerre DO      saccharomyces boulardii  250 mg Oral Daily Vinetta Semen, MD      senna  1 tablet Oral Daily Vinetta Semen, MD          Today, Patient Was Seen By: Sim Cha MD    ** Please Note: Dictation voice to text software may have been used in the creation of this document   **

## 2021-10-28 NOTE — TELEMEDICINE
e-Consult (IPC)  - Interventional Radiology  Toni Bell 77 y o  female MRN: [id number]  Unit/Bed#: [facility] 929-01 Encounter: [id number]    IR has been consulted to evaluate the patient, determine the appropriate procedure, and whether or not a procedure can and should be performed regarding the care of Toni Bell  We were consulted by GERRI concerning unresolved collection, and to possibly perform a drainage insertion if medically appropriate for the patient  IP Consult to IR  Consult performed by: VICTORINA Land  Consult ordered by: Queen Ayush MD        10/28/21      Assessment/Recommendation:     77year old female well known to IR, s/p tube check x2 on 10/27, one tube was completely dislodged and the tract was not able to reaccessed and the other was exchange and repositioned  Unfortunately, the tube that was completely dislodged was the L perinephric tube and a CT scan after the removal demonstrated the collection is still 4 4 x 2 5 x 5 8      - recommend replacement of the L perinephric tube  - keep NPO  - INR < 1 5, currently pending       Total time spent in review of data, discussion with requesting provider and rendering advice was 30 minutes  Patient or appropriate family member was verbally informed by SLIM of this consultative service on their behalf to provide more timely access to specialty care in lieu of an in person consultation  Verbal consent was obtained  Thank you for allowing Interventional Radiology to participate in the care of Toni Bell  Please don't hesitate to call or TigerText us with any questions       [address] [place] [address] Nicola Garcia

## 2021-10-29 LAB
ABO GROUP BLD: NORMAL
APTT PPP: 50 SECONDS (ref 23–37)
BASOPHILS # BLD AUTO: 0.01 THOUSANDS/ΜL (ref 0–0.1)
BASOPHILS NFR BLD AUTO: 0 % (ref 0–1)
BLD GP AB SCN SERPL QL: NEGATIVE
EOSINOPHIL # BLD AUTO: 0.02 THOUSAND/ΜL (ref 0–0.61)
EOSINOPHIL NFR BLD AUTO: 1 % (ref 0–6)
ERYTHROCYTE [DISTWIDTH] IN BLOOD BY AUTOMATED COUNT: 19.9 % (ref 11.6–15.1)
ERYTHROCYTE [DISTWIDTH] IN BLOOD BY AUTOMATED COUNT: 19.9 % (ref 11.6–15.1)
HCT VFR BLD AUTO: 19.4 % (ref 34.8–46.1)
HCT VFR BLD AUTO: 19.5 % (ref 34.8–46.1)
HGB BLD-MCNC: 5.9 G/DL (ref 11.5–15.4)
HGB BLD-MCNC: 6.1 G/DL (ref 11.5–15.4)
IMM GRANULOCYTES # BLD AUTO: 0.04 THOUSAND/UL (ref 0–0.2)
IMM GRANULOCYTES NFR BLD AUTO: 1 % (ref 0–2)
INR PPP: 2 (ref 0.84–1.19)
LYMPHOCYTES # BLD AUTO: 0.38 THOUSANDS/ΜL (ref 0.6–4.47)
LYMPHOCYTES NFR BLD AUTO: 12 % (ref 14–44)
MCH RBC QN AUTO: 29.4 PG (ref 26.8–34.3)
MCH RBC QN AUTO: 29.9 PG (ref 26.8–34.3)
MCHC RBC AUTO-ENTMCNC: 30.4 G/DL (ref 31.4–37.4)
MCHC RBC AUTO-ENTMCNC: 31.3 G/DL (ref 31.4–37.4)
MCV RBC AUTO: 96 FL (ref 82–98)
MCV RBC AUTO: 97 FL (ref 82–98)
MONOCYTES # BLD AUTO: 0.69 THOUSAND/ΜL (ref 0.17–1.22)
MONOCYTES NFR BLD AUTO: 22 % (ref 4–12)
NEUTROPHILS # BLD AUTO: 1.99 THOUSANDS/ΜL (ref 1.85–7.62)
NEUTS SEG NFR BLD AUTO: 64 % (ref 43–75)
NRBC BLD AUTO-RTO: 0 /100 WBCS
PLATELET # BLD AUTO: 159 THOUSANDS/UL (ref 149–390)
PLATELET # BLD AUTO: 164 THOUSANDS/UL (ref 149–390)
PMV BLD AUTO: 10.2 FL (ref 8.9–12.7)
PMV BLD AUTO: 10.4 FL (ref 8.9–12.7)
PROTHROMBIN TIME: 21.7 SECONDS (ref 11.6–14.5)
RBC # BLD AUTO: 2.01 MILLION/UL (ref 3.81–5.12)
RBC # BLD AUTO: 2.04 MILLION/UL (ref 3.81–5.12)
RH BLD: POSITIVE
SPECIMEN EXPIRATION DATE: NORMAL
WBC # BLD AUTO: 2.9 THOUSAND/UL (ref 4.31–10.16)
WBC # BLD AUTO: 3.13 THOUSAND/UL (ref 4.31–10.16)

## 2021-10-29 PROCEDURE — 85610 PROTHROMBIN TIME: CPT | Performed by: PHYSICIAN ASSISTANT

## 2021-10-29 PROCEDURE — 85027 COMPLETE CBC AUTOMATED: CPT | Performed by: PHYSICIAN ASSISTANT

## 2021-10-29 PROCEDURE — 99232 SBSQ HOSP IP/OBS MODERATE 35: CPT | Performed by: INTERNAL MEDICINE

## 2021-10-29 PROCEDURE — P9016 RBC LEUKOCYTES REDUCED: HCPCS

## 2021-10-29 PROCEDURE — 30233N1 TRANSFUSION OF NONAUTOLOGOUS RED BLOOD CELLS INTO PERIPHERAL VEIN, PERCUTANEOUS APPROACH: ICD-10-PCS | Performed by: INTERNAL MEDICINE

## 2021-10-29 PROCEDURE — 86901 BLOOD TYPING SEROLOGIC RH(D): CPT | Performed by: PHYSICIAN ASSISTANT

## 2021-10-29 PROCEDURE — 99024 POSTOP FOLLOW-UP VISIT: CPT | Performed by: SURGERY

## 2021-10-29 PROCEDURE — 86900 BLOOD TYPING SEROLOGIC ABO: CPT | Performed by: PHYSICIAN ASSISTANT

## 2021-10-29 PROCEDURE — 85730 THROMBOPLASTIN TIME PARTIAL: CPT | Performed by: PHYSICIAN ASSISTANT

## 2021-10-29 PROCEDURE — 86850 RBC ANTIBODY SCREEN: CPT | Performed by: PHYSICIAN ASSISTANT

## 2021-10-29 PROCEDURE — B51W1ZZ FLUOROSCOPY OF DIALYSIS SHUNT/FISTULA USING LOW OSMOLAR CONTRAST: ICD-10-PCS | Performed by: RADIOLOGY

## 2021-10-29 PROCEDURE — 86923 COMPATIBILITY TEST ELECTRIC: CPT

## 2021-10-29 PROCEDURE — 99232 SBSQ HOSP IP/OBS MODERATE 35: CPT | Performed by: STUDENT IN AN ORGANIZED HEALTH CARE EDUCATION/TRAINING PROGRAM

## 2021-10-29 PROCEDURE — 85025 COMPLETE CBC W/AUTO DIFF WBC: CPT | Performed by: INTERNAL MEDICINE

## 2021-10-29 PROCEDURE — 99233 SBSQ HOSP IP/OBS HIGH 50: CPT | Performed by: INTERNAL MEDICINE

## 2021-10-29 RX ORDER — HEPARIN SODIUM 5000 [USP'U]/ML
5000 INJECTION, SOLUTION INTRAVENOUS; SUBCUTANEOUS EVERY 12 HOURS SCHEDULED
Status: DISCONTINUED | OUTPATIENT
Start: 2021-10-30 | End: 2021-10-29

## 2021-10-29 RX ORDER — HEPARIN SODIUM 5000 [USP'U]/ML
5000 INJECTION, SOLUTION INTRAVENOUS; SUBCUTANEOUS EVERY 8 HOURS SCHEDULED
Status: DISCONTINUED | OUTPATIENT
Start: 2021-10-30 | End: 2021-11-03

## 2021-10-29 RX ADMIN — PIPERACILLIN AND TAZOBACTAM 2.25 G: 2; .25 INJECTION, POWDER, FOR SOLUTION INTRAVENOUS at 13:41

## 2021-10-29 RX ADMIN — PIPERACILLIN AND TAZOBACTAM 2.25 G: 2; .25 INJECTION, POWDER, FOR SOLUTION INTRAVENOUS at 21:58

## 2021-10-29 RX ADMIN — CALCITRIOL 0.25 MCG: 0.25 CAPSULE, LIQUID FILLED ORAL at 08:10

## 2021-10-29 RX ADMIN — CITALOPRAM HYDROBROMIDE 20 MG: 20 TABLET ORAL at 08:07

## 2021-10-29 RX ADMIN — ATORVASTATIN CALCIUM 40 MG: 40 TABLET, FILM COATED ORAL at 21:58

## 2021-10-29 RX ADMIN — MELATONIN TAB 3 MG 3 MG: 3 TAB at 21:58

## 2021-10-29 RX ADMIN — LEVOTHYROXINE SODIUM 75 MCG: 75 TABLET ORAL at 08:07

## 2021-10-29 RX ADMIN — DOCUSATE SODIUM 100 MG: 100 CAPSULE, LIQUID FILLED ORAL at 16:56

## 2021-10-29 RX ADMIN — PANTOPRAZOLE SODIUM 40 MG: 40 TABLET, DELAYED RELEASE ORAL at 16:56

## 2021-10-29 RX ADMIN — PIPERACILLIN AND TAZOBACTAM 2.25 G: 2; .25 INJECTION, POWDER, FOR SOLUTION INTRAVENOUS at 08:09

## 2021-10-29 RX ADMIN — METOPROLOL TARTRATE 25 MG: 25 TABLET, FILM COATED ORAL at 08:06

## 2021-10-29 RX ADMIN — PIPERACILLIN AND TAZOBACTAM 2.25 G: 2; .25 INJECTION, POWDER, FOR SOLUTION INTRAVENOUS at 01:52

## 2021-10-29 RX ADMIN — Medication 250 MG: at 08:07

## 2021-10-29 RX ADMIN — APIXABAN 2.5 MG: 2.5 TABLET, FILM COATED ORAL at 08:06

## 2021-10-29 RX ADMIN — STANDARDIZED SENNA CONCENTRATE 8.6 MG: 8.6 TABLET ORAL at 08:08

## 2021-10-29 RX ADMIN — DOCUSATE SODIUM 100 MG: 100 CAPSULE, LIQUID FILLED ORAL at 08:06

## 2021-10-29 NOTE — PROGRESS NOTES
[address] [facility]  Progress Note - Haresh De Paz [birth year], 77 y o  female MRN: [id number]  Unit/Bed#: [facility] 929-01 Encounter: [id number]  Primary Care Provider: Birgit Richmond MD   Date and time admitted to hospital: 10/9/2021  2:19 PM    * Sepsis on admission  Assessment & Plan  · Previously with fever coupled with tachycardia and elevated procalcitonin  · Likely secondary to infected left renal hematoma -> Fusobacterium bacteremia noted - fluid culture from 10/13 s/p IR-guided drainage growing Enterobacter/Enterococcus    Bacteremia  Assessment & Plan  · Blood cultures from 10/9 growing Fusobacterium - IV Zosyn transitioned to PO Flagyl per Infectious Disease  · Repeat blood cultures 10/12 are negative    Perinephric abscess  Assessment & Plan  · Presented with left renal hematoma  Pigtail catheter is noted medial to kidney  Renal pelvis may be collapsed  Hemorrhage and thickening extending in the combined interfascial place of retrospective as well as some high density fluid within  · S/p IR drainage 10/12/21 with JOSE ANTONIO drain placement x 2    · Continues to have perinephric abscess which will require drainage, discussed with IR, pt should be off her eliquis for 48h prior to tube replacement  · Restarted on antibiotics by infectious disease          Acute renal failure superimposed on CKD stage 5  Assessment & Plan  · Progressively worsening renal failure  · Dialysis per nephrology  · Nephrology input noted    Anemia of chronic disease  Assessment & Plan  Monitor hemoglobin  Hb of 6 1 today  Obtained consent and will transfuse 2 units of RBCs today    Essential hypertension  Assessment & Plan  Monitor blood pressures  Avoid hypotension    Obstructive uropathy  Assessment & Plan  · Obstructive nephropathy  · Chronic bilateral hydronephrosis stents were recently removed    · Urology and Nephrology following; recommend continuing antibiotics and present treatment      History of pulmonary embolism  Assessment & Plan  Eliquis for anticoagulation, currently on hold for replacement on abscess drainage tube on Monday        VTE Pharmacologic Prophylaxis:   Pharmacologic: Heparin  Mechanical VTE Prophylaxis in Place: Yes    Patient Centered Rounds: I have performed bedside rounds with nursing staff today  Discussions with Specialists or Other Care Team Provider: AYLIN MEHTA    Education and Discussions with Family / Patient: patient and daughter, plan of care    Time Spent for Care: 30 minutes  More than 50% of total time spent on counseling and coordination of care as described above  Current Length of Stay: 20 day(s)    Current Patient Status: Inpatient   Certification Statement: The patient will continue to require additional inpatient hospital stay due to IV abx, needs abscess drainage    Discharge Plan: TBD    Code Status: Level 1 - Full Code      Subjective:   Denies any acute complaints, SOB, uncontrolled pain  Objective:     Vitals:   Temp (24hrs), Av 4 °F (36 9 °C), Min:97 9 °F (36 6 °C), Max:99 3 °F (37 4 °C)    Temp:  [97 9 °F (36 6 °C)-99 3 °F (37 4 °C)] 98 6 °F (37 °C)  HR:  [72-89] 86  Resp:  [18-20] 18  BP: ()/(44-62) 101/56  SpO2:  [97 %-100 %] 100 %  Body mass index is 37 11 kg/m²  Input and Output Summary (last 24 hours): Intake/Output Summary (Last 24 hours) at 10/29/2021 1811  Last data filed at 10/29/2021 1745  Gross per 24 hour   Intake 1127 5 ml   Output 75 ml   Net 1052 5 ml       Physical Exam:     Physical Exam  Constitutional:       Appearance: Normal appearance  HENT:      Head: Normocephalic and atraumatic  Nose: Nose normal       Mouth/Throat:      Mouth: Mucous membranes are moist       Pharynx: Oropharynx is clear  Eyes:      Extraocular Movements: Extraocular movements intact  Cardiovascular:      Rate and Rhythm: Normal rate and regular rhythm     Pulmonary:      Effort: Pulmonary effort is normal    Abdominal:      General: There is no distension  Palpations: Abdomen is soft  Musculoskeletal:      Comments: RUE edema   Neurological:      Mental Status: She is alert and oriented to person, place, and time  Psychiatric:         Mood and Affect: Mood normal          Behavior: Behavior normal            Additional Data:     Labs:    Results from last 7 days   Lab Units 10/29/21  0759 10/29/21  0609   WBC Thousand/uL 2 90* 3 13*   HEMOGLOBIN g/dL 6 1* 5 9*   HEMATOCRIT % 19 5* 19 4*   PLATELETS Thousands/uL 159 164   NEUTROS PCT %  --  64   LYMPHS PCT %  --  12*   MONOS PCT %  --  22*   EOS PCT %  --  1     Results from last 7 days   Lab Units 10/27/21  0555   SODIUM mmol/L 136   POTASSIUM mmol/L 4 0   CHLORIDE mmol/L 102   CO2 mmol/L 28   BUN mg/dL 16   CREATININE mg/dL 2 90*   ANION GAP mmol/L 6   CALCIUM mg/dL 8 4   GLUCOSE RANDOM mg/dL 88     Results from last 7 days   Lab Units 10/29/21  0759   INR  2 00*     Results from last 7 days   Lab Units 10/23/21  0757   POC GLUCOSE mg/dl 113         Results from last 7 days   Lab Units 10/23/21  0618   PROCALCITONIN ng/ml 29 16*           * I Have Reviewed All Lab Data Listed Above  * Additional Pertinent Lab Tests Reviewed:  All Labs Within Last 24 Hours Reviewed        Recent Cultures (last 7 days):           Last 24 Hours Medication List:   Current Facility-Administered Medications   Medication Dose Route Frequency Provider Last Rate    acetaminophen  650 mg Oral Q6H PRN Stuart Green MD      aluminum-magnesium hydroxide-simethicone  30 mL Oral Q6H PRN Stuart Green MD      atorvastatin  40 mg Oral HS Stuart Green MD      calcitriol  0 25 mcg Oral Daily Stuart Green MD      cholecalciferol  800 Units Oral Daily Stuart rGeen MD      citalopram  20 mg Oral Daily Stuart Green MD      docusate sodium  100 mg Oral BID Stuart Green MD      [START ON 10/30/2021] heparin (porcine)  5,000 Units Subcutaneous [facility] MD Carlos Eduardo Degroot levothyroxine  75 mcg Oral Daily Colten Blanca MD      melatonin  3 mg Oral HS Colten Blanca MD      metoprolol  2 5 mg Intravenous Q6H PRN Kristin Espino PA-C      metoprolol tartrate  25 mg Oral BID Kristin Espino PA-C      ondansetron  4 mg Intravenous Q6H PRN Colten Blanca MD      pantoprazole  40 mg Oral BID LAURO Laguerre DO      piperacillin-tazobactam  2 25 g Intravenous Q6H Soniya Conner MD 2 25 g (10/29/21 1341)    saccharomyces boulardii  250 mg Oral Daily Colten Blanca MD      senna  1 tablet Oral Daily Colten Blanca MD          Today, Patient Was Seen By: Rodolfo Lynn MD    ** Please Note: Dictation voice to text software may have been used in the creation of this document   **

## 2021-10-29 NOTE — QUICK NOTE
Patient hgb 6 1, INR 2 and last dose of eliquis this AM  INR should be < 1 5 and eliquis to be held for 48 hours  Will perform L perinephric drainage on Monday  Patient remains stable, asymptomatic, afebrile  Discussed with Dr Kirill Ponce      - hold eliquis  - NPO after MN 11/1  - INR < 1 5     VICTORINA Quinones

## 2021-10-29 NOTE — OCCUPATIONAL THERAPY NOTE
OT CANCEL NOTE:    Pt  Is not medically stable for OT treatment today due to low hemoglobin  OT will continue to follow and treat as able

## 2021-10-29 NOTE — PROGRESS NOTES
Progress Note - Infectious Disease   Andrea Kong 77 y o  female MRN: [id number]  Unit/Bed#: [facility] 929-01 Encounter: [id number]      Impression/Plan:  1  Sepsis, POA   Patient presented with progressing flank/abdominal discomfort likely related to problem 3  Clinical parameters had improved  Overall poor progress likely due to 4  Tachycardia/borderline blood pressures 10/28, antibiotics restarted given 3  Currently stable  Continue antibiotics as below  Continue to trend fever curve/WBC  Ongoing follow-up by Nephrology  Follow-up IR cultures once collected  Additional supportive care as per primary     2  Fusobacterium bacteremia   Source unknown, possibly transient given 1 or occult GI source  Atypical pathogen to be isolated and cause urinary tract infection   CT imaging of the abdomen on admission unremarkable  Repeat imaging with new collections noted in the abdomen and partial SBO, potential source   Drains placed   Patient without any teeth   Repeat blood cultures without growth   Not isolated on fluid cultures  No prior C-scope on chart review   EGD unremarkable   Transaminitis as below   Imaging of the right upper quadrant ultrasound unremarkable   Completed empiric course of Flagyl  No further antibiotics for this issue  Continue to trend fever curve/WBC  Ongoing follow-up by GI  Consider C scope once more stable/outpatient  Supportive care as per primary  Continue antibiotics otherwise as below     3  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   Patient has a chronic obstructive uropathy involving the left side and recently had PCN removal   Subsequently developed bleeding at the site and hematoma on imaging likely due to chronic anticoagulation   IR aspiration and drain placement on 10/13 and earlier urine cultures with same organisms  Drains likely provided significant source control  IR drain check noted 1 tube dislodged    Repeat CT scan with persistent collections, uncertain if these are sterile  with changes in vitals on 10/28, antibiotics restarted  Continue renally dosed Zosyn based on prior cultures  Ongoing follow-up by IR  Will follow-up IR cultures when collected  Continue to trend fever curve/vitals  Repeat labs tomorrow  Supportive care as per primary  Tentative plan for 5-7 days of antibiotics once drains replaced     4  Magdalen Days on CKD with fistula  Patient with baseline chronic kidney disease with fistula created in the right upper extremity   Stenosis causing swelling   Kidney function worsening over entire admission  Initiated on dialysis  Monitor chemistry intermittently  Ongoing follow-up by Nephrology  Vascular surgery follow-up/imaging     5  History of C diff and diarrhea   Patient had a history of C diff in 2019  She has had repeat PCRs in July 2021 and now negative despite recent diarrhea   Likely related to 9  Patient reports no diarrhea today  Antibiotics as above  Monitor stool output  No C diff prophylaxis for now     6  Polycythemia vera and MDS  Recent drop in hemoglobin and leukopenia likely related  Ongoing management/supportive care as per primary   Recommend follow-up with Oncology as outpatient      7  Prior PE on anticoagulation   Likely risk factor for issues as above  AC as per primary        8  Transaminitis   Patient noted to have slowly increasing alkaline phosphatase and AST   In the setting of this bacteremia consider occult abscess  Now downtrending/stable   Liver/RUQ ultrasound unremarkable  Monitor LFTs  Antibiotics restarted as above  GI evaluation noted     9  Melena   Possible upper GI bleed   Ongoing follow-up and workup as per GI   EGD reportedly unremarkable   Additional care as per primary       Above plan discussed briefly with the patient and with primary service attending      ID consult service will re-evaluate this patient again on Monday    Please contact ID attending on call if questions      Antibiotics:  Zosyn 2    Subjective:  Patient seen earlier today and she denied having any nausea, vomiting, chest pain  She overall continues to feel fatigued and weak  She feels that she has not significantly improved since admission  Appetite remains poor  Hemoglobin noted also be significantly low likely contributed to her current fatigue worsening  Tolerating current antibiotic  Objective:  Vitals:  Temp:  [97 9 °F (36 6 °C)-99 3 °F (37 4 °C)] 97 9 °F (36 6 °C)  HR:  [72-86] 79  Resp:  [18-20] 18  BP: ()/(44-62) 92/52  SpO2:  [97 %-100 %] 99 %  Temp (24hrs), Av 4 °F (36 9 °C), Min:97 9 °F (36 6 °C), Max:99 3 °F (37 4 °C)  Current: Temperature: 97 9 °F (36 6 °C)    Physical Exam:   General Appearance:  Chronically ill-appearing and fatigued, frail, volume overload, pale, does not appear in any acute discomfort   Throat: Oropharynx moist without lesions  Lungs:   Clear to auscultation bilaterally; no wheezes, rhonchi or rales; respirations unlabored on room air   Heart:  RRR; no murmur, rub or gallop appreciated   Abdomen:   Soft, non-tender, non-distended, positive bowel sounds;   single IR drain in place  Extremities: No clubbing, cyanosis; improving edema in the left upper extremity  Continued 2+ pitting edema lower extremities bilaterally  Still with significant edema in the right upper extremity  Skin: No new rashes or lesions  No new draining wounds noted         Labs, Imaging, & Other studies:   All pertinent labs and imaging studies were personally reviewed  Results from last 7 days   Lab Units 10/29/21  0759 10/29/21  0609 10/27/21  0555   WBC Thousand/uL 2 90* 3 13* 4 77   HEMOGLOBIN g/dL 6 1* 5 9* 7 2*   PLATELETS Thousands/uL 159 164 151     Results from last 7 days   Lab Units 10/27/21  0555   POTASSIUM mmol/L 4 0   CHLORIDE mmol/L 102   CO2 mmol/L 28   BUN mg/dL 16   CREATININE mg/dL 2 90*   EGFR ml/min/1 73sq m 15   CALCIUM mg/dL 8 4

## 2021-10-29 NOTE — PROGRESS NOTES
Unfortunately, due to multiple emergencies, we were unable to perform drainage of the left Fina nephric collection  Plan to perform it tomorrow

## 2021-10-29 NOTE — PHYSICAL THERAPY NOTE
Physical Therapy Cancellation Note    The pt  With a hemoglobin of 5 9-6 1 today  Will defer any interventions for today, and will continue to follow      Erica Gillespie, PTA

## 2021-10-29 NOTE — ASSESSMENT & PLAN NOTE
· Presented with left renal hematoma  Pigtail catheter is noted medial to kidney  Renal pelvis may be collapsed  Hemorrhage and thickening extending in the combined interfascial place of retrospective as well as some high density fluid within  · S/p IR drainage 10/12/21 with JOSE ANTONIO drain placement x 2    · Continues to have perinephric abscess which will require drainage, discussed with IR, pt should be off her eliquis for 48h prior to tube replacement    · Restarted on antibiotics by infectious disease

## 2021-10-29 NOTE — ASSESSMENT & PLAN NOTE
· Previously with fever coupled with tachycardia and elevated procalcitonin  · Likely secondary to infected left renal hematoma -> Fusobacterium bacteremia noted - fluid culture from 10/13 s/p IR-guided drainage growing Enterobacter/Enterococcus

## 2021-10-29 NOTE — PROGRESS NOTES
NEPHROLOGY PROGRESS NOTE   Marciano Saenz 77 y o  female MRN: [id number]  [facility] Encounter: [id number]  Reason for Consult:   LUANN    ASSESSMENT AND PLAN:  76 yo woman PMH of Hx of obstructive uropathy, bilateral hydronephrosis  status post nephrostomy tube placement 05/11/2021 and status postleft percutaneous/retrograde percutaneous nephrostomy placement on 09/20/2021, ileal conduit 2016, polycythemia vera, HTN, CKD5 p/w abdominal pain was found to have hematoma of the left kidney complicated with sepsis with worsening kidney function  Tomás started HD on 10/20/21 via PC     PLAN:     #LUANN on CKD G5 with no recovery now HD dependent TTS  · Dialysis unit/days:  New start on 10/20/21  · Access:  Left IJ PermCath, has a right AVF s/p  vein transposition on 9/30  · Plan to continue HD as scheduled  · Next HD Saturday 10/30/21  · Renal Diet  · Fluid restriction 1-1 5L/d  · Adjust medications to GFR<10  · Avoid opioids   · Daily weight     #Volume status/hypertension:  · Volume:  Euvolemic  · Blood pressure:  Normotensive, /59  · On metoprolol 25 mg b i d      #Secondary Hyperparasitoidism   · BFZR 980 8  DANEIV   · Hypovitaminosis D as  of PTH  · Continue calcitriol 0 25 mcg  · Low phosphorus diet     #Hypovitaminosis D  · 250  OH 15 2 ng/mL  · On vitamin-D 800 units     # multifactorial Anemia   · Current hemoglobin:  6 1 mg/dL 10/29, confirmed twice  · Treatment:  · No indication of JENELLE at this time due to history of PE  · Transfuse for hemoglobin less than 7 0 per primary service        #vascular access  · Currently left PermCath, with some bruise around the orifice, no inflammation  · Right AV fistula s/p vein transposition 9/30 not get ready to be used  · Fistula with good bruit, good thrill and pulse, edema of the hand  · Fistulogram:recurring stenosis in mid fistula and subclavian  · Increased venous pattern on right shoulder likely secondary from the stenosis  · Right PICC line on the right arm (placed on October 11th)  · Needs vascular follow up      # sepsis  · Infected left renal hematoma  · S/p metronidazole 500 mg t i d   · Id following      SUBJECTIVE:  Patient seen and examined this morning, nurses at bedside drawing blood from PICC line to repeat hemoglobin  Patient denies shortness of breath no chest pain    Last dialysis yesterday well tolerated    OBJECTIVE:  Current Weight: Weight - Scale: 86 2 kg (190 lb)  Vitals:    10/29/21 0755   BP: 114/59   Pulse: 81   Resp:    Temp:    SpO2: 99%       Intake/Output Summary (Last 24 hours) at 10/29/2021 0853  Last data filed at 10/28/2021 1831  Gross per 24 hour   Intake 300 ml   Output 1076 ml   Net -776 ml     Wt Readings from Last 3 Encounters:   10/15/21 86 2 kg (190 lb)   09/30/21 83 9 kg (185 lb)   09/22/21 84 7 kg (186 lb 12 8 oz)     Temp Readings from Last 3 Encounters:   10/28/21 99 3 °F (37 4 °C)   10/05/21 98 4 °F (36 9 °C) (Oral)   09/29/21 (!) 97 °F (36 1 °C) (Temporal)     BP Readings from Last 3 Encounters:   10/29/21 114/59   10/07/21 139/76   10/05/21 131/66     Pulse Readings from Last 3 Encounters:   10/29/21 81   10/07/21 76   10/05/21 80        General:  no acute distress at this time  Skin:  No acute rash  Eyes:  No scleral icterus and noninjected  ENT:  mucous membranes moist  Neck:  , no carotid bruits, 1+ carotid upstroke  Chest:  Clear to auscultation percussion, good respiratory effort, no use of accessory respiratory muscles  CVS:  Regular rate and rhythm without a murmur rub or gallops appreciable  Abdomen:  , soft and nontender normal bowel sounds no hepatosplenomegaly or bruits appreciable  Extremities:  No lower extremity edema,  right hand swelling with increased vein pattern on right shoulder  Neuro:  No gross focality  Psych:  Alert and oriented  Vascular access:  Left IJ PermCath, right AV fistula with good thrill and bruit edema of right hand and prominent vein pattern on right arm    Medications:    Current Facility-Administered Medications:     acetaminophen (TYLENOL) tablet 650 mg, 650 mg, Oral, Q6H PRN, Clarence Ochoa MD, 650 mg at 10/21/21 1215    aluminum-magnesium hydroxide-simethicone (MYLANTA) oral suspension 30 mL, 30 mL, Oral, Q6H PRN, Clarence Ochoa MD    apixaban (ELIQUIS) tablet 2 5 mg, 2 5 mg, Oral, BID, Gavino Morrissey MD, 2 5 mg at 10/29/21 0806    atorvastatin (LIPITOR) tablet 40 mg, 40 mg, Oral, HS, Clarence Ochoa MD, 40 mg at 10/28/21 2210    calcitriol (ROCALTROL) capsule 0 25 mcg, 0 25 mcg, Oral, Daily, Clarence Ochoa MD, 0 25 mcg at 10/29/21 0810    cholecalciferol (VITAMIN D) oral liquid 800 Units, 800 Units, Oral, Daily, Clarence Ochoa MD, 800 Units at 10/29/21 0810    citalopram (CeleXA) tablet 20 mg, 20 mg, Oral, Daily, Clarence Ochoa MD, 20 mg at 10/29/21 0807    docusate sodium (COLACE) capsule 100 mg, 100 mg, Oral, BID, Clarence Ochoa MD, 100 mg at 10/29/21 0806    levothyroxine tablet 75 mcg, 75 mcg, Oral, Daily, Clarence Ochoa MD, 75 mcg at 10/29/21 0807    melatonin tablet 3 mg, 3 mg, Oral, HS, Clarence Ochoa MD, 3 mg at 10/28/21 2210    metoprolol (LOPRESSOR) injection 2 5 mg, 2 5 mg, Intravenous, Q6H PRN, Carly Turner PA-C, 2 5 mg at 10/20/21 1618    metoprolol tartrate (LOPRESSOR) tablet 25 mg, 25 mg, Oral, BID, Carly Turner PA-C, 25 mg at 10/29/21 0806    ondansetron (ZOFRAN) injection 4 mg, 4 mg, Intravenous, Q6H PRN, Clarence Ochoa MD, 4 mg at 10/14/21 2153    pantoprazole (PROTONIX) EC tablet 40 mg, 40 mg, Oral, BID AC, Mumtaz Laguerre, DO, 40 mg at 10/28/21 1823    piperacillin-tazobactam (ZOSYN) 2 25 g in sodium chloride 0 9 % 50 mL IVPB, 2 25 g, Intravenous, Q6H, Jenna Luke MD, Last Rate: 100 mL/hr at 10/29/21 0809, 2 25 g at 10/29/21 0809    saccharomyces boulardii (FLORASTOR) capsule 250 mg, 250 mg, Oral, Daily, Clarence Ochoa MD, 250 mg at 10/29/21 0807    senna (SENOKOT) tablet 8 6 mg, 1 tablet, Oral, Daily, Tristin Zhang MD, 8 6 mg at 10/29/21 5102    Laboratory Results:  Results from last 7 days   Lab Units 10/29/21  0759 10/29/21  0609 10/27/21  0555 10/26/21  0844   WBC Thousand/uL 2 90* 3 13* 4 77  --    HEMOGLOBIN g/dL 6 1* 5 9* 7 2*  --    HEMATOCRIT % 19 5* 19 4* 24 3*  --    PLATELETS Thousands/uL 159 164 151  --    SODIUM mmol/L  --   --  136 134*   POTASSIUM mmol/L  --   --  4 0 3 9   CHLORIDE mmol/L  --   --  102 101   CO2 mmol/L  --   --  28 26   BUN mg/dL  --   --  16 36*   CREATININE mg/dL  --   --  2 90* 4 77*   CALCIUM mg/dL  --   --  8 4 8 3   PHOSPHORUS mg/dL  --   --   --  5 9*       CT abdomen pelvis wo contrast   Final Result by Radha Morin MD (10/27 1839)      Persistent left perinephric collection with a surrounding rind suspicious for perinephric abscess  Resolved left paracolic gutter collection; a pigtail catheter is in place  Thickened rind surrounding a pelvic posterior cul-de-sac collection  Unchanged thin-walled right iliac fossa collection  The study was marked in [facility] for immediate notification  Workstation performed: [id number]         IR AV fistulagram/graftogram   Final Result by Meek Wong MD (10/27 1722)   Impression:       Recurrent subclavian vein stenosis dilated with 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 with 50% residual narrowing  The patient may require a stent in the future after removal of the central venous catheter  Successful angioplasty of a mid basilic vein stenosis  Workstation performed: [id number]         IR drainage tube check/change/reposition/reinsertion/upsize   Final Result by Meek Wong MD (10/27 1712)   Impression:       Successful exchange of the perinephric drain  The more anterior drain was dislodged and tract was occluded        Workstation performed: [id number]         US right upper quadrant   Final Result by John Smith MD (10/22 1404)      Nonobstructive right renal calculi  Otherwise unremarkable sonographic study  Please note CT of 10/13/2021 demonstrated a left kidney subcapsular collection and left abdominal and right lower quadrant collection  Workstation performed: [id number]         IR tunneled dialysis catheter placement   Final Result by Kimberley Pettit MD (10/21 1118)   Impression:      Successful placement of a left internal jugular vein 32 cm tunneled dialysis catheter  Workstation performed: [id number]         US kidney and bladder   Final Result by Benny Alna MD (10/20 1047)      1  Mild right upper pole caliectasis, difficult to compare to prior CT, but likely present to some degree on that study  Multiple nonobstructing right renal calculi  2   Difficult visualization of the left kidney due to body habitus  No gross hydronephrosis  Partially visualized left perinephric drainage catheter with likely some residual perinephric collection, noting poor evaluation  3   Unchanged 7 6 cm simple right lower quadrant fluid collection and fluid collection versus free fluid in the cul-de-sac, as seen on prior CT  Workstation performed: [id number]         VAS hemodialysis access duplex right upper limb avf   Final Result by Meg Vega MD (10/20 2209)      XR chest portable   Final Result by Kimberley Pettit MD (10/18 0927)      Persistent left pleural disease                    Workstation performed: [id number]         IR drainage tube placement   Final Result by Kendra Nina MD (10/13 2041)   Impression:       Image guided 10 Kosovan drain placement into a left flank/paracolic gutter collection       Image guided 10 Kosovan drain placement into a left perinephric collection         Workstation performed: [id number]         CT chest abdomen pelvis wo contrast   Final Result by Donna Loza MD (10/13 7543)      There is a increasing dilation of the small bowel loops in the upper to mid abdomen with the zone of transition suggest partial small bowel obstruction       New loculated fluid collection in the left paracolic gutter measuring 6 8 x 3 6 x 12 6 cm      Additional loculated fluid collection within the pelvic cul-de-sac, stable   Previously noted the postoperative right iliac fossa collection likely related to suggest lymphocele/seroma, stable      Retrograde the left nephroureteral stent with resolution of the left hydronephrosis      Subcapsular fluid collection left kidney, moderate on previous study      No new collection with the air-fluid level      No airspace consolidations lungs   Area of groundglass density seen in the posterior aspect of the left upper lobe may be due to atelectasis/pneumonitis       I personally discussed this study with Vinod Guardado on 10/13/2021 at 2:29 PM                Workstation performed: [id number]         IR IN-Patient Thoracentesis   Final Result by El Holman MD (10/12 2214)   Impression:      Ultrasound-guided left thoracentesis      Small effusion which was difficult to aspirate  Workstation performed: [id number]         IR non-tunneled central line placement   Final Result by El Holman MD (10/12 2216)   Impression:       Image guided placement of a nontunneled 5 [place] double-lumen power injectable central venous catheter via the right external jugular vein  Workstation performed: [id number]         VAS hemodialysis access duplex right upper limb avf   Final Result by Blue Garg MD (10/11 1031)      CT abdomen pelvis wo contrast   Final Result by Maty Rodriguez MD (10/09 1625)      In the left renal fossa, there is a collection of mixed intermediate and high attenuation suggesting a hematoma with some bubbles of gas  Pigtail catheter is noted medial to the kidney  The renal pelvis may be collapsed    There appears to be some    hemorrhage and thickening extending in the combined interfascial plane of the retroperitoneum as well as some high density fluid within the pelvis suggesting hemoperitoneum  Superinfection of the kidney is possible  Urology evaluation is advised  Nonobstructing calculi in the right kidney  Some prominent loops of bowel are more likely large bowel suggesting ileus  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  This was discussed with Dr Niels Cortez at 4:20 PM               Workstation performed: [id number]         XR chest portable   Final Result by Ernie Tanner MD (10/10 9369)      Medial left base slight atelectasis and or small infiltrate  Small left effusion      The study was marked in [employer] for immediate notification  Workstation performed: [id number]         IR drainage tube placement    (Results Pending)       Portions of the record may have been created with voice recognition software  Occasional wrong word or "sound a like" substitutions may have occurred due to the inherent limitations of voice recognition software   Read the chart carefully and recognize, using context, where substitutions have occurred

## 2021-10-30 ENCOUNTER — APPOINTMENT (INPATIENT)
Dept: DIALYSIS | Facility: HOSPITAL | Age: 76
DRG: 981 | End: 2021-10-30
Payer: COMMERCIAL

## 2021-10-30 LAB
ABO GROUP BLD BPU: NORMAL
ABO GROUP BLD BPU: NORMAL
BPU ID: NORMAL
BPU ID: NORMAL
CROSSMATCH: NORMAL
CROSSMATCH: NORMAL
ERYTHROCYTE [DISTWIDTH] IN BLOOD BY AUTOMATED COUNT: 18.8 % (ref 11.6–15.1)
HCT VFR BLD AUTO: 25 % (ref 34.8–46.1)
HGB BLD-MCNC: 8 G/DL (ref 11.5–15.4)
MCH RBC QN AUTO: 29.5 PG (ref 26.8–34.3)
MCHC RBC AUTO-ENTMCNC: 32 G/DL (ref 31.4–37.4)
MCV RBC AUTO: 92 FL (ref 82–98)
PLATELET # BLD AUTO: 184 THOUSANDS/UL (ref 149–390)
PMV BLD AUTO: 9.9 FL (ref 8.9–12.7)
RBC # BLD AUTO: 2.71 MILLION/UL (ref 3.81–5.12)
UNIT DISPENSE STATUS: NORMAL
UNIT DISPENSE STATUS: NORMAL
UNIT PRODUCT CODE: NORMAL
UNIT PRODUCT CODE: NORMAL
UNIT PRODUCT VOLUME: 350 ML
UNIT PRODUCT VOLUME: 350 ML
UNIT RH: NORMAL
UNIT RH: NORMAL
WBC # BLD AUTO: 3.15 THOUSAND/UL (ref 4.31–10.16)

## 2021-10-30 PROCEDURE — 90935 HEMODIALYSIS ONE EVALUATION: CPT | Performed by: INTERNAL MEDICINE

## 2021-10-30 PROCEDURE — 85027 COMPLETE CBC AUTOMATED: CPT | Performed by: INTERNAL MEDICINE

## 2021-10-30 PROCEDURE — 99233 SBSQ HOSP IP/OBS HIGH 50: CPT | Performed by: INTERNAL MEDICINE

## 2021-10-30 RX ADMIN — Medication 250 MG: at 14:19

## 2021-10-30 RX ADMIN — HEPARIN SODIUM 5000 UNITS: 5000 INJECTION INTRAVENOUS; SUBCUTANEOUS at 14:19

## 2021-10-30 RX ADMIN — PANTOPRAZOLE SODIUM 40 MG: 40 TABLET, DELAYED RELEASE ORAL at 17:16

## 2021-10-30 RX ADMIN — MELATONIN TAB 3 MG 3 MG: 3 TAB at 22:12

## 2021-10-30 RX ADMIN — CITALOPRAM HYDROBROMIDE 20 MG: 20 TABLET ORAL at 14:19

## 2021-10-30 RX ADMIN — PIPERACILLIN AND TAZOBACTAM 2.25 G: 2; .25 INJECTION, POWDER, FOR SOLUTION INTRAVENOUS at 23:01

## 2021-10-30 RX ADMIN — CALCITRIOL 0.25 MCG: 0.25 CAPSULE, LIQUID FILLED ORAL at 14:19

## 2021-10-30 RX ADMIN — METOPROLOL TARTRATE 25 MG: 25 TABLET, FILM COATED ORAL at 17:16

## 2021-10-30 RX ADMIN — PIPERACILLIN AND TAZOBACTAM 2.25 G: 2; .25 INJECTION, POWDER, FOR SOLUTION INTRAVENOUS at 01:02

## 2021-10-30 RX ADMIN — HEPARIN SODIUM 5000 UNITS: 5000 INJECTION INTRAVENOUS; SUBCUTANEOUS at 05:07

## 2021-10-30 RX ADMIN — ATORVASTATIN CALCIUM 40 MG: 40 TABLET, FILM COATED ORAL at 22:12

## 2021-10-30 RX ADMIN — LEVOTHYROXINE SODIUM 75 MCG: 75 TABLET ORAL at 14:20

## 2021-10-30 RX ADMIN — HEPARIN SODIUM 5000 UNITS: 5000 INJECTION INTRAVENOUS; SUBCUTANEOUS at 22:12

## 2021-10-30 RX ADMIN — PANTOPRAZOLE SODIUM 40 MG: 40 TABLET, DELAYED RELEASE ORAL at 05:07

## 2021-10-30 RX ADMIN — PIPERACILLIN AND TAZOBACTAM 2.25 G: 2; .25 INJECTION, POWDER, FOR SOLUTION INTRAVENOUS at 11:57

## 2021-10-30 RX ADMIN — PIPERACILLIN AND TAZOBACTAM 2.25 G: 2; .25 INJECTION, POWDER, FOR SOLUTION INTRAVENOUS at 17:16

## 2021-10-30 NOTE — PLAN OF CARE
Post-Dialysis RN Treatment Note    Blood Pressure:  Pre 112/59 mm/Hg  Post 115/48 mmHg   EDW  TBD kg    Weight:  Pre 85 8 kg   Post 84 5 kg   Mode of weight measurement: Bed Scale   Volume Removed  1500 ml    Treatment duration 210 minutes    NS given  no   Treatment shortened?  No   Medications given during Rx Zosyn   Estimated Kt/V  n/a   Access type: Permacath/TDC   Access Status: Yes, describe: maintained  during tx    Report called to primary nurse   Yes Quay Brunner RN  For 3 5 hr tx, 1 5L net fluid removal, 3K bath  Problem: METABOLIC, FLUID AND ELECTROLYTES - ADULT  Goal: Electrolytes maintained within normal limits  Description: INTERVENTIONS:  - Monitor labs and assess patient for signs and symptoms of electrolyte imbalances  - Administer electrolyte replacement as ordered  - Monitor response to electrolyte replacements, including repeat lab results as appropriate  - Instruct patient on fluid and nutrition as appropriate  Outcome: Progressing  Goal: Fluid balance maintained  Description: INTERVENTIONS:  - Monitor labs   - Monitor I/O and WT  - Instruct patient on fluid and nutrition as appropriate  - Assess for signs & symptoms of volume excess or deficit  Outcome: Progressing

## 2021-10-30 NOTE — PROGRESS NOTES
NEPHROLOGY HEMODIALYSIS PROCEDURE NOTE    Seen and examined on hemodialysis  Awakens with stimuli  Overall appears fairly comfortable  Currently using 1 needle with her AV fistula  QB: 200  QD: 1 5x  Access: AV access / permcath  Dialyzer: 160  Projected Kt/V:   Sodium: 138  Potassium: 3  Bicarbonate: 35  Ultrafiltration: 1-2  Medications given on HD: -    Physical Exam:    /51   Pulse 81   Temp 97 6 °F (36 4 °C) (Oral)   Resp 16   Ht 5' (1 524 m)   Wt 86 2 kg (190 lb)   SpO2 98%   BMI 37 11 kg/m²     General:  No acute distress, appears comfortable  CVS:  Regular  Lungs:  Clear to auscultation  Abdomen:  Obese, soft nontender  Access:  PermCath and left upper arm AV access  Extremities:  +1 edema    Assessment:  1  Acute on chronic kidney disease, stage 5, now hemodialysis dependent, end-stage renal disease currently Tuesday Thursday Saturday  2  Access:  IJ PermCath currently using 1 needle with right upper arm AV fistula with recent transposition  3  Secondary hyperparathyroidism, , continue with calcitriol  4  Anemia of chronic kidney disease, navid on hold given history of PE  5  Sepsis with infected left renal hematoma currently on antibiotic treatment, Infectious Disease following, anticipating IR drainage tube placement  6  Chronic obstructive uropathy with history of left PCN  7   Bacteremia, currently on IV Zosyn      Plan:  Ultrafiltrate 1-2 L  Currently using AV fistula with adequate cannulation and blood flow  Antibiotics as per Infectious Disease, awaiting IR drain placement  No other changes in current regimen

## 2021-10-30 NOTE — ASSESSMENT & PLAN NOTE
· Presented with left renal hematoma  Pigtail catheter is noted medial to kidney  Renal pelvis may be collapsed  Hemorrhage and thickening extending in the combined interfascial place of retrospective as well as some high density fluid within  · S/p IR drainage 10/12/21 with JOSE ANTONIO drain placement x 2    · Continues to have perinephric abscess which will require drainage, discussed with IR, pt should be off her eliquis for 48h prior to tube replacement    · Restarted on antibiotics by infectious disease  · IR drainage of abscess planned for Monday

## 2021-10-30 NOTE — ASSESSMENT & PLAN NOTE
· Underwent repeat RUE vascular doppler study noting "a narrowing in the subclavian vein at the clavicle created elevated  PSV and turbulent flow  The dialysis AVF appears to be matured with adequate diameter, flow volumes and less than 6mm in depth throughout the arm  Antegrade, high resistant blunted flow noted in the peripheral arteries  No evidence of outflow obstruction  No evidence of a pseudoaneurysm    No evidence of a large venous branch "  · 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  · S/p fistulagram

## 2021-10-30 NOTE — ASSESSMENT & PLAN NOTE
· Hx of Polycythemia vera and MDS  · Significant anemia secondary to blood loss in the past    · The patient is currently off of the [facility] treatment for her PV and getting mainly Aranesp on every 28 day basis    · Hematology consulted for evaluation, recommend once patient is better/ stable, discharge, she will follow-up with Dr Dieter Bo, her primary hematologist

## 2021-10-30 NOTE — PROGRESS NOTES
[facility]  Progress Note - Yesica Cease [birth year], 77 y o  female MRN: [id number]  Unit/Bed#: [facility] 929-01 Encounter: [id number]  Primary Care Provider: Orlando Burgos MD   Date and time admitted to hospital: 10/9/2021  2:19 PM    * Sepsis on admission  Assessment & Plan  · Previously with fever coupled with tachycardia and elevated procalcitonin  · Likely secondary to infected left renal hematoma -> Fusobacterium bacteremia noted - fluid culture from 10/13 s/p IR-guided drainage growing Enterobacter/Enterococcus    Bacteremia  Assessment & Plan  · Blood cultures from 10/9 growing Fusobacterium - IV Zosyn transitioned to PO Flagyl per Infectious Disease  · Repeat blood cultures 10/12 are negative    Perinephric abscess  Assessment & Plan  · Presented with left renal hematoma  Pigtail catheter is noted medial to kidney  Renal pelvis may be collapsed  Hemorrhage and thickening extending in the combined interfascial place of retrospective as well as some high density fluid within  · S/p IR drainage 10/12/21 with JOSE ANTONIO drain placement x 2    · Continues to have perinephric abscess which will require drainage, discussed with IR, pt should be off her eliquis for 48h prior to tube replacement  · Restarted on antibiotics by infectious disease  · IR drainage of abscess planned for Monday          Swelling of right upper extremity  Assessment & Plan  · Underwent repeat RUE vascular doppler study noting "a narrowing in the subclavian vein at the clavicle created elevated  PSV and turbulent flow  The dialysis AVF appears to be matured with adequate diameter, flow volumes and less than 6mm in depth throughout the arm  Antegrade, high resistant blunted flow noted in the peripheral arteries  No evidence of outflow obstruction  No evidence of a pseudoaneurysm    No evidence of a large venous branch "  · 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  · S/p fistulagram    Pleural effusion  Assessment & Plan  Mild to moderate pleural effusion on CXR  S/p thoracentesis by IR 10/11/21   -patient currently comfortable on room air, chest x-ray from prior shows persistent left pleural effusion      Acute renal failure superimposed on CKD stage 5  Assessment & Plan  · Progressively worsening renal failure  · Dialysis TTS    Anemia of chronic disease  Assessment & Plan  Monitor hemoglobin  s/p 2 units of RBCs on 10/29  Hb improved    Polycythemia vera ([address] )  Assessment & Plan  · Hx of Polycythemia vera and MDS  · Significant anemia secondary to blood loss in the past    · The patient is currently off of the [facility] treatment for her PV and getting mainly Aranesp on every 28 day basis  · Hematology consulted for evaluation, recommend once patient is better/ stable, discharge, she will follow-up with Dr Cristhian Spann, her primary hematologist     Essential hypertension  Assessment & Plan  Monitor blood pressures  Avoid hypotension    Obstructive uropathy  Assessment & Plan  · Obstructive nephropathy  · Chronic bilateral hydronephrosis stents were recently removed  · Urology and Nephrology following; recommend continuing antibiotics and present treatment      History of pulmonary embolism  Assessment & Plan  Eliquis for anticoagulation, currently on hold for replacement on abscess drainage tube on Monday        VTE Pharmacologic Prophylaxis:   Pharmacologic: Heparin  Mechanical VTE Prophylaxis in Place: Yes    Patient Centered Rounds: I have performed bedside rounds with nursing staff today  Discussions with Specialists or Other Care Team Provider: ID    Education and Discussions with Family / Patient: patient, plan of care    Time Spent for Care: 20 minutes  More than 50% of total time spent on counseling and coordination of care as described above      Current Length of Stay: 21 day(s)    Current Patient Status: Inpatient   Certification Statement: The patient will continue to require additional inpatient hospital stay due to awaiting drainage of perinephric abscess    Discharge Plan: rehab when stable    Code Status: Level 1 - Full Code      Subjective:   Denies any shortness of breath or uncontrolled pain  Feels tired    Objective:     Vitals:   Temp (24hrs), Av 3 °F (36 8 °C), Min:97 6 °F (36 4 °C), Max:99 °F (37 2 °C)    Temp:  [97 6 °F (36 4 °C)-99 °F (37 2 °C)] 97 8 °F (36 6 °C)  HR:  [60-89] 89  Resp:  [16-20] 18  BP: ()/(48-70) 114/69  SpO2:  [98 %-100 %] 100 %  Body mass index is 37 11 kg/m²  Input and Output Summary (last 24 hours): Intake/Output Summary (Last 24 hours) at 10/30/2021 1608  Last data filed at 10/30/2021 1430  Gross per 24 hour   Intake 1777 5 ml   Output 2078 ml   Net -300 5 ml       Physical Exam:     Physical Exam  Constitutional:       Appearance: Normal appearance  HENT:      Head: Normocephalic and atraumatic  Nose: Nose normal       Mouth/Throat:      Mouth: Mucous membranes are moist       Pharynx: Oropharynx is clear  Eyes:      Extraocular Movements: Extraocular movements intact  Cardiovascular:      Rate and Rhythm: Normal rate and regular rhythm  Pulmonary:      Effort: Pulmonary effort is normal       Breath sounds: No wheezing or rales  Abdominal:      General: There is no distension  Palpations: Abdomen is soft  Tenderness: There is abdominal tenderness  Musculoskeletal:      Comments: RUE edema   Neurological:      General: No focal deficit present  Mental Status: She is alert and oriented to person, place, and time     Psychiatric:         Mood and Affect: Mood normal          Behavior: Behavior normal            Additional Data:     Labs:    Results from last 7 days   Lab Units 10/30/21  0508 10/29/21  0609   WBC Thousand/uL 3 15* 3 13*   HEMOGLOBIN g/dL 8 0* 5 9*   HEMATOCRIT % 25 0* 19 4*   PLATELETS Thousands/uL 184 164   NEUTROS PCT %  --  64   LYMPHS PCT %  --  12*   MONOS PCT %  --  22*   EOS PCT %  --  1     Results from last 7 days   Lab Units 10/27/21  0555   SODIUM mmol/L 136   POTASSIUM mmol/L 4 0   CHLORIDE mmol/L 102   CO2 mmol/L 28   BUN mg/dL 16   CREATININE mg/dL 2 90*   ANION GAP mmol/L 6   CALCIUM mg/dL 8 4   GLUCOSE RANDOM mg/dL 88     Results from last 7 days   Lab Units 10/29/21  0759   INR  2 00*                       * I Have Reviewed All Lab Data Listed Above  * Additional Pertinent Lab Tests Reviewed: All Labs Within Last 24 Hours Reviewed      Recent Cultures (last 7 days):           Last 24 Hours Medication List:   Current Facility-Administered Medications   Medication Dose Route Frequency Provider Last Rate    acetaminophen  650 mg Oral Q6H PRN Clarence Ochoa MD      atorvastatin  40 mg Oral HS Clarence Ochoa MD      calcitriol  0 25 mcg Oral Daily Clarence Ochoa MD      cholecalciferol  800 Units Oral Daily Clarence Ochoa MD      citalopram  20 mg Oral Daily Clarence Ochoa MD      heparin (porcine)  5,000 Units Subcutaneous [facility] José Crabtree MD      levothyroxine  75 mcg Oral Daily Clarence Ochoa MD      melatonin  3 mg Oral HS Clarence Ochoa MD      metoprolol  2 5 mg Intravenous Q6H PRN Carly Turner PA-C      metoprolol tartrate  25 mg Oral BID Carly Turner PA-C      ondansetron  4 mg Intravenous Q6H PRN Clarence Ochoa MD      pantoprazole  40 mg Oral BID LAURO Laguerre DO      piperacillin-tazobactam  2 25 g Intravenous Q6H Jenna Luke MD 2 25 g (10/30/21 1157)    saccharomyces boulardii  250 mg Oral Daily Clarence Ochoa MD          Today, Patient Was Seen By: Rula Umanzor MD    ** Please Note: Dictation voice to text software may have been used in the creation of this document   **

## 2021-10-31 PROCEDURE — 99232 SBSQ HOSP IP/OBS MODERATE 35: CPT | Performed by: INTERNAL MEDICINE

## 2021-10-31 RX ADMIN — HEPARIN SODIUM 5000 UNITS: 5000 INJECTION INTRAVENOUS; SUBCUTANEOUS at 22:33

## 2021-10-31 RX ADMIN — HEPARIN SODIUM 5000 UNITS: 5000 INJECTION INTRAVENOUS; SUBCUTANEOUS at 05:09

## 2021-10-31 RX ADMIN — Medication 250 MG: at 09:36

## 2021-10-31 RX ADMIN — MELATONIN TAB 3 MG 3 MG: 3 TAB at 22:33

## 2021-10-31 RX ADMIN — PIPERACILLIN AND TAZOBACTAM 2.25 G: 2; .25 INJECTION, POWDER, FOR SOLUTION INTRAVENOUS at 05:09

## 2021-10-31 RX ADMIN — LEVOTHYROXINE SODIUM 75 MCG: 75 TABLET ORAL at 09:36

## 2021-10-31 RX ADMIN — PANTOPRAZOLE SODIUM 40 MG: 40 TABLET, DELAYED RELEASE ORAL at 15:28

## 2021-10-31 RX ADMIN — PIPERACILLIN AND TAZOBACTAM 2.25 G: 2; .25 INJECTION, POWDER, FOR SOLUTION INTRAVENOUS at 22:33

## 2021-10-31 RX ADMIN — CITALOPRAM HYDROBROMIDE 20 MG: 20 TABLET ORAL at 09:36

## 2021-10-31 RX ADMIN — CALCITRIOL 0.25 MCG: 0.25 CAPSULE, LIQUID FILLED ORAL at 09:37

## 2021-10-31 RX ADMIN — HEPARIN SODIUM 5000 UNITS: 5000 INJECTION INTRAVENOUS; SUBCUTANEOUS at 15:28

## 2021-10-31 RX ADMIN — PANTOPRAZOLE SODIUM 40 MG: 40 TABLET, DELAYED RELEASE ORAL at 05:09

## 2021-10-31 RX ADMIN — METOPROLOL TARTRATE 25 MG: 25 TABLET, FILM COATED ORAL at 17:36

## 2021-10-31 RX ADMIN — ATORVASTATIN CALCIUM 40 MG: 40 TABLET, FILM COATED ORAL at 22:33

## 2021-10-31 RX ADMIN — METOPROLOL TARTRATE 25 MG: 25 TABLET, FILM COATED ORAL at 09:36

## 2021-10-31 RX ADMIN — PIPERACILLIN AND TAZOBACTAM 2.25 G: 2; .25 INJECTION, POWDER, FOR SOLUTION INTRAVENOUS at 17:35

## 2021-10-31 RX ADMIN — PIPERACILLIN AND TAZOBACTAM 2.25 G: 2; .25 INJECTION, POWDER, FOR SOLUTION INTRAVENOUS at 11:15

## 2021-10-31 NOTE — ASSESSMENT & PLAN NOTE
· Hx of Polycythemia vera and MDS  · Significant anemia secondary to blood loss in the past    · The patient is currently off of the [facility] treatment for her PV and getting mainly Aranesp on every 28 day basis    · Hematology consulted for evaluation, recommend once patient is better/ stable, discharge, she will follow-up with Dr Nataliia Chilel, her primary hematologist

## 2021-10-31 NOTE — ASSESSMENT & PLAN NOTE
· Previously with fever coupled with tachycardia and elevated procalcitonin  · Likely secondary to infected left renal hematoma/perinephric abscess -> Fusobacterium bacteremia noted - fluid culture from 10/13 s/p IR-guided drainage growing Enterobacter/Enterococcus

## 2021-10-31 NOTE — ASSESSMENT & PLAN NOTE
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

## 2021-10-31 NOTE — PROGRESS NOTES
[facility]  Progress Note - Omari Campos [birth year], 77 y o  female MRN: [id number]  Unit/Bed#: [facility] 929-01 Encounter: [id number]  Primary Care Provider: Hetal Gore MD   Date and time admitted to hospital: 10/9/2021  2:19 PM    * Sepsis on admission  Assessment & Plan  · Previously with fever coupled with tachycardia and elevated procalcitonin  · Likely secondary to infected left renal hematoma/perinephric abscess -> Fusobacterium bacteremia noted - fluid culture from 10/13 s/p IR-guided drainage growing Enterobacter/Enterococcus    Bacteremia  Assessment & Plan  · Blood cultures from 10/9 growing Fusobacterium - IV Zosyn transitioned to PO Flagyl per Infectious Disease  · Repeat blood cultures 10/12 are negative    Perinephric abscess  Assessment & Plan  · Presented with left renal hematoma  Pigtail catheter is noted medial to kidney  Renal pelvis may be collapsed  Hemorrhage and thickening extending in the combined interfascial place of retrospective as well as some high density fluid within  · S/p IR drainage 10/12/21 with JOSE ANTONIO drain placement x 2    · Continues to have perinephric abscess which will require drainage, discussed with IR, pt should be off her eliquis for 48h prior to tube replacement  · Restarted on antibiotics by infectious disease  · IR drainage of abscess planned for Monday          Swelling of right upper extremity  Assessment & Plan  · Underwent repeat RUE vascular doppler study noting "a narrowing in the subclavian vein at the clavicle created elevated  PSV and turbulent flow  The dialysis AVF appears to be matured with adequate diameter, flow volumes and less than 6mm in depth throughout the arm  Antegrade, high resistant blunted flow noted in the peripheral arteries  No evidence of outflow obstruction  No evidence of a pseudoaneurysm    No evidence of a large venous branch "  · 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  · S/p fistulagram  · Improved with HD yesterday    Pleural effusion  Assessment & Plan  Mild to moderate pleural effusion on CXR  S/p thoracentesis by IR 10/11/21   -patient currently comfortable on room air, chest x-ray from prior shows persistent left pleural effusion      Acute renal failure superimposed on CKD stage 5  Assessment & Plan  · Progressively worsening renal failure  · Dialysis TTS    Anemia of chronic disease  Assessment & Plan  · Monitor hemoglobin  · s/p 2 units of RBCs on 10/29  · Hb improved    Polycythemia vera ([address] )  Assessment & Plan  · Hx of Polycythemia vera and MDS  · Significant anemia secondary to blood loss in the past    · The patient is currently off of the [facility] treatment for her PV and getting mainly Aranesp on every 28 day basis  · Hematology consulted for evaluation, recommend once patient is better/ stable, discharge, she will follow-up with Dr Kassie Pro, her primary hematologist     Essential hypertension  Assessment & Plan  · Monitor blood pressures  · Avoid hypotension    Obstructive uropathy  Assessment & Plan  · Obstructive nephropathy  · Chronic bilateral hydronephrosis stents were recently removed  · Urology and Nephrology following; recommend continuing antibiotics and present treatment      History of pulmonary embolism  Assessment & Plan  · Eliquis for anticoagulation, currently on hold for replacement on abscess drainage tube on Monday        VTE Pharmacologic Prophylaxis:   Pharmacologic: Heparin  Mechanical VTE Prophylaxis in Place: Yes    Patient Centered Rounds: I have performed bedside rounds with nursing staff today  Education and Discussions with Family / Patient: patient, plan of care    Time Spent for Care: 20 minutes  More than 50% of total time spent on counseling and coordination of care as described above      Current Length of Stay: 22 day(s)    Current Patient Status: Inpatient   Certification Statement: The patient will continue to require additional inpatient hospital stay due to abscess drain placement tomorrow    Discharge Plan: rehab    Code Status: Level 1 - Full Code      Subjective:   Denies any new complaints  No uncontrolled pain, no shortness of breath    Objective:     Vitals:   Temp (24hrs), Av 5 °F (36 9 °C), Min:97 8 °F (36 6 °C), Max:99 4 °F (37 4 °C)    Temp:  [97 8 °F (36 6 °C)-99 4 °F (37 4 °C)] 98 1 °F (36 7 °C)  HR:  [] 88  Resp:  [17-20] 17  BP: ()/(52-74) 118/74  SpO2:  [90 %-100 %] 94 %  Body mass index is 37 11 kg/m²  Input and Output Summary (last 24 hours): Intake/Output Summary (Last 24 hours) at 10/31/2021 1328  Last data filed at 10/31/2021 1300  Gross per 24 hour   Intake 330 ml   Output 115 ml   Net 215 ml       Physical Exam:     Physical Exam  Constitutional:       Appearance: Normal appearance  HENT:      Head: Normocephalic and atraumatic  Nose: Nose normal       Mouth/Throat:      Mouth: Mucous membranes are moist       Pharynx: Oropharynx is clear  Eyes:      Extraocular Movements: Extraocular movements intact  Cardiovascular:      Rate and Rhythm: Normal rate and regular rhythm  Pulmonary:      Effort: Pulmonary effort is normal       Breath sounds: No wheezing or rales  Abdominal:      General: There is no distension  Palpations: Abdomen is soft  Tenderness: There is abdominal tenderness  Musculoskeletal:      Right lower leg: No edema  Left lower leg: No edema  Comments: improved RUE edema   Neurological:      Mental Status: She is alert and oriented to person, place, and time  Mental status is at baseline     Psychiatric:         Mood and Affect: Mood normal          Behavior: Behavior normal            Additional Data:     Labs:    Results from last 7 days   Lab Units 10/30/21  0508 10/29/21  0609   WBC Thousand/uL 3 15* 3 13*   HEMOGLOBIN g/dL 8 0* 5 9*   HEMATOCRIT % 25 0* 19 4*   PLATELETS Thousands/uL 184 164   NEUTROS PCT %  --  64 LYMPHS PCT %  --  12*   MONOS PCT %  --  22*   EOS PCT %  --  1     Results from last 7 days   Lab Units 10/27/21  0555   SODIUM mmol/L 136   POTASSIUM mmol/L 4 0   CHLORIDE mmol/L 102   CO2 mmol/L 28   BUN mg/dL 16   CREATININE mg/dL 2 90*   ANION GAP mmol/L 6   CALCIUM mg/dL 8 4   GLUCOSE RANDOM mg/dL 88     Results from last 7 days   Lab Units 10/29/21  0759   INR  2 00*                       * I Have Reviewed All Lab Data Listed Above  * Additional Pertinent Lab Tests Reviewed: All Labs Within Last 24 Hours Reviewed        Recent Cultures (last 7 days):           Last 24 Hours Medication List:   Current Facility-Administered Medications   Medication Dose Route Frequency Provider Last Rate    acetaminophen  650 mg Oral Q6H PRN Viola Ramos MD      atorvastatin  40 mg Oral HS Viola Ramos MD      calcitriol  0 25 mcg Oral Daily Viola Ramos MD      cholecalciferol  800 Units Oral Daily Viola Ramos MD      citalopram  20 mg Oral Daily Viola Ramos MD      heparin (porcine)  5,000 Units Subcutaneous [facility] Mahesh Perla MD      levothyroxine  75 mcg Oral Daily Viola Ramos MD      melatonin  3 mg Oral HS Viola Ramos MD      metoprolol  2 5 mg Intravenous Q6H PRN Eli Dhillon PA-C      metoprolol tartrate  25 mg Oral BID Eli Dhillon PA-C      ondansetron  4 mg Intravenous Q6H PRN Viola Ramos MD      pantoprazole  40 mg Oral BID LAURO Laguerre DO      piperacillin-tazobactam  2 25 g Intravenous Q6H Emanuel Hurst MD 2 25 g (10/31/21 1115)    saccharomyces boulardii  250 mg Oral Daily Viola Ramos MD          Today, Patient Was Seen By: Leeanna Rose MD    ** Please Note: Dictation voice to text software may have been used in the creation of this document   **

## 2021-10-31 NOTE — PLAN OF CARE
Problem: MOBILITY - ADULT  Goal: Maintain or return to baseline ADL function  Description: INTERVENTIONS:  -  Assess patient's ability to carry out ADLs; assess patient's baseline for ADL function and identify physical deficits which impact ability to perform ADLs (bathing, care of mouth/teeth, toileting, grooming, dressing, etc )  - Assess/evaluate cause of self-care deficits   - Assess range of motion  - Assess patient's mobility; develop plan if impaired  - Assess patient's need for assistive devices and provide as appropriate  - Encourage maximum independence but intervene and supervise when necessary  - Involve family in performance of ADLs  - Assess for home care needs following discharge   - Consider OT consult to assist with ADL evaluation and planning for discharge  - Provide patient education as appropriate  Outcome: Not Progressing  Goal: Maintains/Returns to pre admission functional level  Description: INTERVENTIONS:  - Perform BMAT or MOVE assessment daily    - Set and communicate daily mobility goal to care team and patient/family/caregiver     - Collaborate with rehabilitation services on mobility goals if consulted  - Record patient progress and toleration of activity level   Outcome: Not Progressing     Problem: Potential for Falls  Goal: Patient will remain free of falls  Description: INTERVENTIONS:  - Educate patient/family on patient safety including physical limitations  - Instruct patient to call for assistance with activity   - Consult OT/PT to assist with strengthening/mobility   - Keep Call bell within reach  - Keep bed low and locked with side rails adjusted as appropriate  - Keep care items and personal belongings within reach  - Initiate and maintain comfort rounds  - Make Fall Risk Sign visible to staff  - Apply yellow socks and bracelet for high fall risk patients  - Consider moving patient to room near nurses station  Outcome: Progressing     Problem: Prexisting or High Potential for Compromised Skin Integrity  Goal: Skin integrity is maintained or improved  Description: INTERVENTIONS:  - Identify patients at risk for skin breakdown  - Assess and monitor skin integrity  - Assess and monitor nutrition and hydration status  - Monitor labs   - Assess for incontinence   - Turn and reposition patient  - Assist with mobility/ambulation  - Relieve pressure over bony prominences  - Avoid friction and shearing  - Provide appropriate hygiene as needed including keeping skin clean and dry  - Evaluate need for skin moisturizer/barrier cream  - Collaborate with interdisciplinary team   - Patient/family teaching  - Consider wound care consult   Outcome: Progressing     Problem: Nutrition/Hydration-ADULT  Goal: Nutrient/Hydration intake appropriate for improving, restoring or maintaining nutritional needs  Description: Monitor and assess patient's nutrition/hydration status for malnutrition  Collaborate with interdisciplinary team and initiate plan and interventions as ordered  Monitor patient's weight and dietary intake as ordered or per policy  Utilize nutrition screening tool and intervene as necessary  Determine patient's food preferences and provide high-protein, high-caloric foods as appropriate       INTERVENTIONS:  - Monitor oral intake, urinary output, labs, and treatment plans  - Assess nutrition and hydration status and recommend course of action  - Evaluate amount of meals eaten  - Assist patient with eating if necessary   - Allow adequate time for meals  - Recommend/ encourage appropriate diets, oral nutritional supplements, and vitamin/mineral supplements  - Order, calculate, and assess calorie counts as needed  - Recommend, monitor, and adjust tube feedings and TPN/PPN based on assessed needs  - Assess need for intravenous fluids  - Provide specific nutrition/hydration education as appropriate  - Include patient/family/caregiver in decisions related to nutrition  Outcome: Not Progressing Problem: METABOLIC, FLUID AND ELECTROLYTES - ADULT  Goal: Electrolytes maintained within normal limits  Description: INTERVENTIONS:  - Monitor labs and assess patient for signs and symptoms of electrolyte imbalances  - Administer electrolyte replacement as ordered  - Monitor response to electrolyte replacements, including repeat lab results as appropriate  - Instruct patient on fluid and nutrition as appropriate  Outcome: Not Progressing  Goal: Fluid balance maintained  Description: INTERVENTIONS:  - Monitor labs   - Monitor I/O and WT  - Instruct patient on fluid and nutrition as appropriate  - Assess for signs & symptoms of volume excess or deficit  Outcome: Not Progressing

## 2021-11-01 ENCOUNTER — HOSPITAL ENCOUNTER (OUTPATIENT)
Dept: INFUSION CENTER | Facility: HOSPITAL | Age: 76
End: 2021-11-01
Attending: INTERNAL MEDICINE

## 2021-11-01 ENCOUNTER — APPOINTMENT (INPATIENT)
Dept: RADIOLOGY | Facility: HOSPITAL | Age: 76
DRG: 981 | End: 2021-11-01
Payer: COMMERCIAL

## 2021-11-01 LAB
ABO GROUP BLD BPU: NORMAL
BPU ID: NORMAL
ERYTHROCYTE [DISTWIDTH] IN BLOOD BY AUTOMATED COUNT: 18.1 % (ref 11.6–15.1)
HCT VFR BLD AUTO: 26 % (ref 34.8–46.1)
HGB BLD-MCNC: 8.4 G/DL (ref 11.5–15.4)
INR PPP: 1.45 (ref 0.84–1.19)
MCH RBC QN AUTO: 29.8 PG (ref 26.8–34.3)
MCHC RBC AUTO-ENTMCNC: 32.3 G/DL (ref 31.4–37.4)
MCV RBC AUTO: 92 FL (ref 82–98)
PLATELET # BLD AUTO: 172 THOUSANDS/UL (ref 149–390)
PMV BLD AUTO: 9.4 FL (ref 8.9–12.7)
PROTHROMBIN TIME: 17 SECONDS (ref 11.6–14.5)
RBC # BLD AUTO: 2.82 MILLION/UL (ref 3.81–5.12)
UNIT DISPENSE STATUS: NORMAL
UNIT PRODUCT CODE: NORMAL
UNIT PRODUCT VOLUME: 280 ML
UNIT RH: NORMAL
WBC # BLD AUTO: 4 THOUSAND/UL (ref 4.31–10.16)

## 2021-11-01 PROCEDURE — 99153 MOD SED SAME PHYS/QHP EA: CPT

## 2021-11-01 PROCEDURE — 99152 MOD SED SAME PHYS/QHP 5/>YRS: CPT

## 2021-11-01 PROCEDURE — C1769 GUIDE WIRE: HCPCS

## 2021-11-01 PROCEDURE — 99152 MOD SED SAME PHYS/QHP 5/>YRS: CPT | Performed by: RADIOLOGY

## 2021-11-01 PROCEDURE — 87075 CULTR BACTERIA EXCEPT BLOOD: CPT | Performed by: INTERNAL MEDICINE

## 2021-11-01 PROCEDURE — C1729 CATH, DRAINAGE: HCPCS

## 2021-11-01 PROCEDURE — NC001 PR NO CHARGE: Performed by: RADIOLOGY

## 2021-11-01 PROCEDURE — 10030 IMG GID FLU COLL DRG SFT TIS: CPT

## 2021-11-01 PROCEDURE — 87070 CULTURE OTHR SPECIMN AEROBIC: CPT | Performed by: INTERNAL MEDICINE

## 2021-11-01 PROCEDURE — 0W9H30Z DRAINAGE OF RETROPERITONEUM WITH DRAINAGE DEVICE, PERCUTANEOUS APPROACH: ICD-10-PCS | Performed by: RADIOLOGY

## 2021-11-01 PROCEDURE — 99232 SBSQ HOSP IP/OBS MODERATE 35: CPT | Performed by: INTERNAL MEDICINE

## 2021-11-01 PROCEDURE — 99233 SBSQ HOSP IP/OBS HIGH 50: CPT | Performed by: INTERNAL MEDICINE

## 2021-11-01 PROCEDURE — 85610 PROTHROMBIN TIME: CPT | Performed by: INTERNAL MEDICINE

## 2021-11-01 PROCEDURE — 85027 COMPLETE CBC AUTOMATED: CPT | Performed by: STUDENT IN AN ORGANIZED HEALTH CARE EDUCATION/TRAINING PROGRAM

## 2021-11-01 PROCEDURE — 49406 IMAGE CATH FLUID PERI/RETRO: CPT | Performed by: RADIOLOGY

## 2021-11-01 PROCEDURE — 87205 SMEAR GRAM STAIN: CPT | Performed by: INTERNAL MEDICINE

## 2021-11-01 RX ORDER — FENTANYL CITRATE 50 UG/ML
INJECTION, SOLUTION INTRAMUSCULAR; INTRAVENOUS CODE/TRAUMA/SEDATION MEDICATION
Status: COMPLETED | OUTPATIENT
Start: 2021-11-01 | End: 2021-11-01

## 2021-11-01 RX ORDER — SODIUM CHLORIDE 9 MG/ML
10 INJECTION INTRAVENOUS DAILY
Qty: 300 ML | Refills: 3 | Status: SHIPPED | OUTPATIENT
Start: 2021-11-01 | End: 2021-11-24 | Stop reason: ALTCHOICE

## 2021-11-01 RX ORDER — MIDAZOLAM HYDROCHLORIDE 2 MG/2ML
INJECTION, SOLUTION INTRAMUSCULAR; INTRAVENOUS CODE/TRAUMA/SEDATION MEDICATION
Status: COMPLETED | OUTPATIENT
Start: 2021-11-01 | End: 2021-11-01

## 2021-11-01 RX ADMIN — CALCITRIOL 0.25 MCG: 0.25 CAPSULE, LIQUID FILLED ORAL at 10:48

## 2021-11-01 RX ADMIN — PIPERACILLIN AND TAZOBACTAM 2.25 G: 2; .25 INJECTION, POWDER, FOR SOLUTION INTRAVENOUS at 23:15

## 2021-11-01 RX ADMIN — MELATONIN TAB 3 MG 3 MG: 3 TAB at 21:31

## 2021-11-01 RX ADMIN — FENTANYL CITRATE 25 MCG: 50 INJECTION INTRAMUSCULAR; INTRAVENOUS at 19:26

## 2021-11-01 RX ADMIN — METOPROLOL TARTRATE 25 MG: 25 TABLET, FILM COATED ORAL at 17:06

## 2021-11-01 RX ADMIN — PANTOPRAZOLE SODIUM 40 MG: 40 TABLET, DELAYED RELEASE ORAL at 17:06

## 2021-11-01 RX ADMIN — ATORVASTATIN CALCIUM 40 MG: 40 TABLET, FILM COATED ORAL at 21:31

## 2021-11-01 RX ADMIN — PIPERACILLIN AND TAZOBACTAM 2.25 G: 2; .25 INJECTION, POWDER, FOR SOLUTION INTRAVENOUS at 17:12

## 2021-11-01 RX ADMIN — METOPROLOL TARTRATE 25 MG: 25 TABLET, FILM COATED ORAL at 10:49

## 2021-11-01 RX ADMIN — LEVOTHYROXINE SODIUM 75 MCG: 75 TABLET ORAL at 10:49

## 2021-11-01 RX ADMIN — PANTOPRAZOLE SODIUM 40 MG: 40 TABLET, DELAYED RELEASE ORAL at 05:05

## 2021-11-01 RX ADMIN — HEPARIN SODIUM 5000 UNITS: 5000 INJECTION INTRAVENOUS; SUBCUTANEOUS at 05:05

## 2021-11-01 RX ADMIN — CITALOPRAM HYDROBROMIDE 20 MG: 20 TABLET ORAL at 10:48

## 2021-11-01 RX ADMIN — PIPERACILLIN AND TAZOBACTAM 2.25 G: 2; .25 INJECTION, POWDER, FOR SOLUTION INTRAVENOUS at 05:06

## 2021-11-01 RX ADMIN — PIPERACILLIN AND TAZOBACTAM 2.25 G: 2; .25 INJECTION, POWDER, FOR SOLUTION INTRAVENOUS at 10:51

## 2021-11-01 RX ADMIN — HEPARIN SODIUM 5000 UNITS: 5000 INJECTION INTRAVENOUS; SUBCUTANEOUS at 21:32

## 2021-11-01 RX ADMIN — Medication 250 MG: at 10:48

## 2021-11-01 RX ADMIN — MIDAZOLAM 0.5 MG: 1 INJECTION INTRAMUSCULAR; INTRAVENOUS at 19:26

## 2021-11-01 NOTE — BRIEF OP NOTE (RAD/CATH)
INTERVENTIONAL RADIOLOGY PROCEDURE NOTE    Date: 11/1/2021    Procedure: IR DRAINAGE TUBE PLACEMENT     Preoperative diagnosis:   1  Renal hematoma    2  Sepsis ([facility] [address] )    3  Pleural effusion    4  Iron deficiency anemia due to chronic blood loss    5  Intra-abdominal collection    6  Atrial fibrillation ([facility] )    7  Melena         Postoperative diagnosis: Same  Surgeon: Wen Stover MD     Assistant: None  No qualified resident was available  Blood loss:  Trace    Specimens:  Sent for culture    Findings:  10 [place] drain placed into subcapsular/perinephric abscess  5 mL pus aspirated  Catheter connected to a JOSE ANTONIO bulb  Complications: None immediate      Anesthesia: conscious sedation

## 2021-11-01 NOTE — PROGRESS NOTES
NEPHROLOGY PROGRESS NOTE   Amos Fernandez 77 y o  female MRN: [id number]  Unit/Bed#: [facility] 929-01 Encounter: [id number]  Reason for Consult:  Acute renal failure with advanced chronic kidney disease      SUMMARY:    78-year-old female with history of advanced chronic kidney disease and obstructive uropathy with a history of hydronephrosis and stent placement who had presented with stent removal prior, polycythemia vera, hypertension presented for abdominal pain and imaging revealed a hematoma the left kidney and had also met sepsis criteria  ASSESSMENT and PLAN:    Acute renal failure advanced underlying chronic kidney disease Stage V  --currently dialysis dependent and deemed end-stage renal disease due to advanced chronic kidney disease  --access:  Right arm AV fistula ( s/p  vein transposition on 9/30), utilized 1 needle on Saturday along with left IJ PermCath  Her fistulograms as for referring stenosis  --underwent last dialysis on Saturday October 30th  --currently dialyzing Tuesday Thursday Saturday  --sepsis secondary to left renal hematoma  --plan for dialysis tomorrow    Anemia chronic kidney disease  --not on Epogen due to history of pulmonary embolism  --transfuse for hemoglobin less than 7  --evidence of melena with recent possible upper GI bleed  EGD was unremarkable    Mineral bone disorder-chronic kidney disease  --vitamin-D deficiency  -- 4, replace vitamin-D stores  --currently on calcitriol 0 25 mcg    Blood pressure/volume status  --history of hypertension  --blood pressure currently labile but stable  --was hypotension on her dialysis day  --does not examine overtly volume overloaded  --currently on metoprolol 25 mg twice a day (high dialyzed)    Sepsis  --infected left renal hematoma with chronic obstructive uropathy, recent PCN removal, resulting in bleeding while being maintained on anticoagulation    Underwent IR aspiration and drain placement on October 13th , ongoing follow-up with IR  --CT scan shows persistent collection  --antibiotics as per Infectious Disease  --Fusobacterium bacteremia, unknown source    Polycythemia/MDS  --not on Epogen  --recommend Hematology management          SUBJECTIVE / INTERVAL HISTORY:    No overnight events    OBJECTIVE:  Current Weight: Weight - Scale: 86 2 kg (190 lb)  Vitals:    10/31/21 1615 10/31/21 1734 10/31/21 2241 11/01/21 0908   BP: 117/71 118/70 103/64 104/64   Pulse: 82 84 77 77   Resp: 20  15 16   Temp: 98 6 °F (37 °C)  99 °F (37 2 °C) 98 4 °F (36 9 °C)   TempSrc:   Oral    SpO2: 98% 99% 98% 100%   Weight:       Height:           Intake/Output Summary (Last 24 hours) at 11/1/2021 1046  Last data filed at 10/31/2021 2233  Gross per 24 hour   Intake 110 ml   Output 150 ml   Net -40 ml       Review of Systems:    12 point ROS has been reviewed  Physical Exam  Vitals and nursing note reviewed  Exam conducted with a chaperone present  Constitutional:       General: She is not in acute distress  Appearance: She is well-developed  She is obese  She is not diaphoretic  HENT:      Head: Normocephalic and atraumatic  Eyes:      General: No scleral icterus  Pupils: Pupils are equal, round, and reactive to light  Cardiovascular:      Rate and Rhythm: Normal rate and regular rhythm  Heart sounds: Normal heart sounds  No murmur heard  No friction rub  No gallop  Pulmonary:      Effort: Pulmonary effort is normal  No respiratory distress  Breath sounds: Normal breath sounds  No wheezing or rales  Chest:      Chest wall: No tenderness  Abdominal:      General: Bowel sounds are normal  There is no distension  Palpations: Abdomen is soft  Tenderness: There is abdominal tenderness  There is no rebound  Musculoskeletal:         General: Normal range of motion  Cervical back: Normal range of motion and neck supple  Skin:     General: Skin is dry  Coloration: Skin is pale  Findings: No rash  Neurological:      Mental Status: She is alert and oriented to person, place, and time           Medications:    Current Facility-Administered Medications:     acetaminophen (TYLENOL) tablet 650 mg, 650 mg, Oral, Q6H PRN, Stuart Green MD, 650 mg at 10/21/21 1215    atorvastatin (LIPITOR) tablet 40 mg, 40 mg, Oral, HS, Stuart Green MD, 40 mg at 10/31/21 2233    calcitriol (ROCALTROL) capsule 0 25 mcg, 0 25 mcg, Oral, Daily, Stuart Green MD, 0 25 mcg at 10/31/21 2879    cholecalciferol (VITAMIN D) oral liquid 800 Units, 800 Units, Oral, Daily, Stuart Green MD, 800 Units at 10/31/21 6087    citalopram (CeleXA) tablet 20 mg, 20 mg, Oral, Daily, Stuart Green MD, 20 mg at 10/31/21 0936    heparin (porcine) subcutaneous injection 5,000 Units, 5,000 Units, Subcutaneous, Q8H [address], Luis Echeverria MD, 5,000 Units at 11/01/21 0505    levothyroxine tablet 75 mcg, 75 mcg, Oral, Daily, Stuart Green MD, 75 mcg at 10/31/21 0936    melatonin tablet 3 mg, 3 mg, Oral, HS, Stuart Green MD, 3 mg at 10/31/21 2233    metoprolol (LOPRESSOR) injection 2 5 mg, 2 5 mg, Intravenous, Q6H PRN, Irma Milton PA-C, 2 5 mg at 10/20/21 1618    metoprolol tartrate (LOPRESSOR) tablet 25 mg, 25 mg, Oral, BID, Irma Milton PA-C, 25 mg at 10/31/21 1736    ondansetron (ZOFRAN) injection 4 mg, 4 mg, Intravenous, Q6H PRN, Stuart Green MD, 4 mg at 10/14/21 2153    pantoprazole (PROTONIX) EC tablet 40 mg, 40 mg, Oral, BID AC, Mumtaz Laguerre, DO, 40 mg at 11/01/21 0505    piperacillin-tazobactam (ZOSYN) 2 25 g in sodium chloride 0 9 % 50 mL IVPB, 2 25 g, Intravenous, Q6H, Theodore Greene MD, Last Rate: 100 mL/hr at 11/01/21 0506, 2 25 g at 11/01/21 0506    saccharomyces boulardii (FLORASTOR) capsule 250 mg, 250 mg, Oral, Daily, Stuart Green MD, 250 mg at 10/31/21 5120    Laboratory Results:  Results from last 7 days   Lab Units 11/01/21  0328 10/30/21  0261 10/29/21  0759 10/29/21  5999 10/27/21  0555 10/26/21  0844   WBC Thousand/uL 4 00* 3 15* 2 90* 3 13* 4 77  --    HEMOGLOBIN g/dL 8 4* 8 0* 6 1* 5 9* 7 2*  --    HEMATOCRIT % 26 0* 25 0* 19 5* 19 4* 24 3*  --    PLATELETS Thousands/uL 172 184 159 164 151  --    POTASSIUM mmol/L  --   --   --   --  4 0 3 9   CHLORIDE mmol/L  --   --   --   --  102 101   CO2 mmol/L  --   --   --   --  28 26   BUN mg/dL  --   --   --   --  16 36*   CREATININE mg/dL  --   --   --   --  2 90* 4 77*   CALCIUM mg/dL  --   --   --   --  8 4 8 3   PHOSPHORUS mg/dL  --   --   --   --   --  5 9*

## 2021-11-01 NOTE — PLAN OF CARE
Problem: Nutrition/Hydration-ADULT  Goal: Nutrient/Hydration intake appropriate for improving, restoring or maintaining nutritional needs  Description: Monitor and assess patient's nutrition/hydration status for malnutrition  Collaborate with interdisciplinary team and initiate plan and interventions as ordered  Monitor patient's weight and dietary intake as ordered or per policy  Utilize nutrition screening tool and intervene as necessary  Determine patient's food preferences and provide high-protein, high-caloric foods as appropriate       INTERVENTIONS:  - Monitor oral intake, urinary output, labs, and treatment plans  - Assess nutrition and hydration status and recommend course of action  - Evaluate amount of meals eaten  - Assist patient with eating if necessary   - Allow adequate time for meals  - Recommend/ encourage appropriate diets, oral nutritional supplements, and vitamin/mineral supplements  - Order, calculate, and assess calorie counts as needed  - Recommend, monitor, and adjust tube feedings and TPN/PPN based on assessed needs  - Assess need for intravenous fluids  - Provide specific nutrition/hydration education as appropriate  - Include patient/family/caregiver in decisions related to nutrition  Outcome: Progressing  Note: Limited progress; not consistent

## 2021-11-01 NOTE — NUTRITION
11/01/21 6549   Recommendations/Interventions   Summary patient with overall sub-optimal PO intake and lack of appetite; seems to consume nepro in variable amounts   Interventions Diet: to Advance; Initiate Appetite Stimulant  (when PO diet resumes will schedule nepro TID)   Nutrition Recommendations Other (Specify)  (consider appetite stimulant)

## 2021-11-01 NOTE — PHYSICAL THERAPY NOTE
Pt is off the floor in IR will hold   Will continue to follow  Dany Salvador, PT, DPT     11/01/21 1524   PT Last Visit   PT Visit Date 11/01/21   Note Type   Note Type Treatment   Cancel Reasons Patient to operating room

## 2021-11-01 NOTE — PROGRESS NOTES
The history and physical were reviewed, along with progress notes, and the patient was examined  There are no changes since it was written      /56   Pulse 70   Temp 97 9 °F (36 6 °C)   Resp 18   Ht 5' (1 524 m)   Wt 84 5 kg (186 lb 4 6 oz) Comment: post dialysis weight on 10/30  SpO2 100%   BMI 36 38 kg/m²

## 2021-11-01 NOTE — ASSESSMENT & PLAN NOTE
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

## 2021-11-01 NOTE — ASSESSMENT & PLAN NOTE
· Eliquis for anticoagulation, currently on hold for replacement on abscess drainage tube today, restart likely tomorrow

## 2021-11-01 NOTE — SEDATION DOCUMENTATION
Left renal abscess drain placed by Dr Nereyda Buck  Patient tolerated well, denies pain and discomfort post procedure  Dressing clean dry and intact  Culture specimens to lab per orders  Report called to Gustavo Pugh

## 2021-11-01 NOTE — ASSESSMENT & PLAN NOTE
· Presented with left renal hematoma  Pigtail catheter is noted medial to kidney  Renal pelvis may be collapsed  Hemorrhage and thickening extending in the combined interfascial place of retrospective as well as some high density fluid within  · S/p IR drainage 10/12/21 with JOSE ANTONIO drain placement x 2    · Continues to have perinephric abscess which will require drainage, discussed with IR, pt should be off her eliquis for 48h prior to tube replacement    · Restarted on antibiotics by infectious disease  · IR drainage of abscess planned for today, follow up fluid studies and monitor drain output

## 2021-11-01 NOTE — ASSESSMENT & PLAN NOTE
· Hx of Polycythemia vera and MDS  · Significant anemia secondary to blood loss in the past    · The patient is currently off of the [facility] treatment for her PV and getting mainly Aranesp on every 28 day basis    · Hematology consulted for evaluation, recommend once patient is better/ stable, discharge, she will follow-up with Dr Ghulam Washington, her primary hematologist

## 2021-11-01 NOTE — PROGRESS NOTES
Veda Méndez  Progress Note - Joe Benjamin [birth year], 77 y o  female MRN: [id number]  Unit/Bed#: [facility] 929-01 Encounter: [id number]  Primary Care Provider: Yassine Sierra MD   Date and time admitted to hospital: 10/9/2021  2:19 PM    * Sepsis on admission  Assessment & Plan  · Previously with fever coupled with tachycardia and elevated procalcitonin  · Likely secondary to infected left renal hematoma/perinephric abscess -> Fusobacterium bacteremia noted - fluid culture from 10/13 s/p IR-guided drainage growing Enterobacter/Enterococcus    Bacteremia  Assessment & Plan  · Blood cultures from 10/9 growing Fusobacterium - IV Zosyn transitioned to PO Flagyl per Infectious Disease  · Repeat blood cultures 10/12 are negative    Perinephric abscess  Assessment & Plan  · Presented with left renal hematoma  Pigtail catheter is noted medial to kidney  Renal pelvis may be collapsed  Hemorrhage and thickening extending in the combined interfascial place of retrospective as well as some high density fluid within  · S/p IR drainage 10/12/21 with JOSE ANTONIO drain placement x 2    · Continues to have perinephric abscess which will require drainage, discussed with IR, pt should be off her eliquis for 48h prior to tube replacement  · Restarted on antibiotics by infectious disease  · IR drainage of abscess planned for today, follow up fluid studies and monitor drain output          Swelling of right upper extremity  Assessment & Plan  · Underwent repeat RUE vascular doppler study noting "a narrowing in the subclavian vein at the clavicle created elevated  PSV and turbulent flow  The dialysis AVF appears to be matured with adequate diameter, flow volumes and less than 6mm in depth throughout the arm  Antegrade, high resistant blunted flow noted in the peripheral arteries  No evidence of outflow obstruction  No evidence of a pseudoaneurysm    No evidence of a large venous branch "  · 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  · S/p fistulagram  · Improved with HD    Pleural effusion  Assessment & Plan  Mild to moderate pleural effusion on CXR  S/p thoracentesis by IR 10/11/21   -patient currently comfortable on room air, chest x-ray from prior shows persistent left pleural effusion      Acute renal failure superimposed on CKD stage 5  Assessment & Plan  · Progressively worsening renal failure  · Dialysis TTS    Anemia of chronic disease  Assessment & Plan  · Monitor hemoglobin  · s/p 2 units of RBCs on 10/29  · Hb improved    Polycythemia vera ([address] )  Assessment & Plan  · Hx of Polycythemia vera and MDS  · Significant anemia secondary to blood loss in the past    · The patient is currently off of the [facility] treatment for her PV and getting mainly Aranesp on every 28 day basis  · Hematology consulted for evaluation, recommend once patient is better/ stable, discharge, she will follow-up with Dr Duc Arellano, her primary hematologist     Essential hypertension  Assessment & Plan  · Monitor blood pressures  · Avoid hypotension    Obstructive uropathy  Assessment & Plan  · Obstructive nephropathy  · Chronic bilateral hydronephrosis stents were recently removed  · Urology and Nephrology following; recommend continuing antibiotics and present treatment      History of pulmonary embolism  Assessment & Plan  · Eliquis for anticoagulation, currently on hold for replacement on abscess drainage tube today, restart likely tomorrow        VTE Pharmacologic Prophylaxis:   Pharmacologic: Heparin  Mechanical VTE Prophylaxis in Place: Yes    Patient Centered Rounds: I have performed bedside rounds with nursing staff today  Education and Discussions with Family / Patient: patient's daughter updated via telephone    Time Spent for Care: 20 minutes  More than 50% of total time spent on counseling and coordination of care as described above      Current Length of Stay: 23 day(s)    Current Patient Status: Inpatient   Certification Statement: The patient will continue to require additional inpatient hospital stay due to IR abscess drainage today    Discharge Plan: rehab when stable    Code Status: Level 1 - Full Code      Subjective:   Reports that she feels tired  No shortness of breath or uncontrolled pain  Objective:     Vitals:   Temp (24hrs), Av 7 °F (37 1 °C), Min:98 4 °F (36 9 °C), Max:99 °F (37 2 °C)    Temp:  [98 4 °F (36 9 °C)-99 °F (37 2 °C)] 98 4 °F (36 9 °C)  HR:  [77-84] 77  Resp:  [15-20] 16  BP: (103-118)/(64-71) 104/64  SpO2:  [98 %-100 %] 100 %  Body mass index is 37 11 kg/m²  Input and Output Summary (last 24 hours): Intake/Output Summary (Last 24 hours) at 2021 1304  Last data filed at 10/31/2021 2233  Gross per 24 hour   Intake 10 ml   Output 150 ml   Net -140 ml       Physical Exam:     Physical Exam  Constitutional:       Appearance: She is obese  HENT:      Head: Normocephalic and atraumatic  Nose: Nose normal       Mouth/Throat:      Mouth: Mucous membranes are moist       Pharynx: Oropharynx is clear  Eyes:      Extraocular Movements: Extraocular movements intact  Cardiovascular:      Rate and Rhythm: Normal rate and regular rhythm  Pulmonary:      Effort: Pulmonary effort is normal       Breath sounds: No wheezing or rales  Abdominal:      General: There is no distension  Palpations: Abdomen is soft  Musculoskeletal:      Comments: RUE edema improved   Skin:     General: Skin is warm and dry  Neurological:      Mental Status: She is alert and oriented to person, place, and time     Psychiatric:      Comments: Flat affect           Additional Data:     Labs:    Results from last 7 days   Lab Units 21  0328 10/29/21  0609   WBC Thousand/uL 4 00* 3 13*   HEMOGLOBIN g/dL 8 4* 5 9*   HEMATOCRIT % 26 0* 19 4*   PLATELETS Thousands/uL 172 164   NEUTROS PCT %  --  64   LYMPHS PCT %  --  12*   MONOS PCT %  --  22* EOS PCT %  --  1     Results from last 7 days   Lab Units 10/27/21  0555   SODIUM mmol/L 136   POTASSIUM mmol/L 4 0   CHLORIDE mmol/L 102   CO2 mmol/L 28   BUN mg/dL 16   CREATININE mg/dL 2 90*   ANION GAP mmol/L 6   CALCIUM mg/dL 8 4   GLUCOSE RANDOM mg/dL 88     Results from last 7 days   Lab Units 11/01/21  0328   INR  1 45*                       * I Have Reviewed All Lab Data Listed Above  * Additional Pertinent Lab Tests Reviewed: All Labs Within Last 24 Hours Reviewed      Recent Cultures (last 7 days):           Last 24 Hours Medication List:   Current Facility-Administered Medications   Medication Dose Route Frequency Provider Last Rate    acetaminophen  650 mg Oral Q6H PRN Bradly Farah MD      atorvastatin  40 mg Oral HS Bradly Farah MD      calcitriol  0 25 mcg Oral Daily Bradly Farah MD      cholecalciferol  800 Units Oral Daily Bradly Farah MD      citalopram  20 mg Oral Daily Bradly Farah MD      heparin (porcine)  5,000 Units Subcutaneous [facility] Pietro Hermosillo MD      levothyroxine  75 mcg Oral Daily Bradly Farah MD      melatonin  3 mg Oral HS Bradly Farah MD      metoprolol  2 5 mg Intravenous Q6H PRN Anant Arias PA-C      metoprolol tartrate  25 mg Oral BID Anant Arias PA-C      ondansetron  4 mg Intravenous Q6H PRN Bradly Farah MD      pantoprazole  40 mg Oral BID AC Mumtaz Laguerre DO      piperacillin-tazobactam  2 25 g Intravenous Q6H Demetrice Harris MD 2 25 g (11/01/21 1051)    saccharomyces boulardii  250 mg Oral Daily Bradly Farah MD          Today, Patient Was Seen By: Olena Sanchez MD    ** Please Note: Dictation voice to text software may have been used in the creation of this document   **

## 2021-11-01 NOTE — PLAN OF CARE
Problem: MOBILITY - ADULT  Goal: Maintain or return to baseline ADL function  Description: INTERVENTIONS:  -  Assess patient's ability to carry out ADLs; assess patient's baseline for ADL function and identify physical deficits which impact ability to perform ADLs (bathing, care of mouth/teeth, toileting, grooming, dressing, etc )  - Assess/evaluate cause of self-care deficits   - Assess range of motion  - Assess patient's mobility; develop plan if impaired  - Assess patient's need for assistive devices and provide as appropriate  - Encourage maximum independence but intervene and supervise when necessary  - Involve family in performance of ADLs  - Assess for home care needs following discharge   - Consider OT consult to assist with ADL evaluation and planning for discharge  - Provide patient education as appropriate  Outcome: Progressing  Goal: Maintains/Returns to pre admission functional level  Description: INTERVENTIONS:  - Perform BMAT or MOVE assessment daily    - Set and communicate daily mobility goal to care team and patient/family/caregiver     - Collaborate with rehabilitation services on mobility goals if consulted  - Out of bed for toileting  - Record patient progress and toleration of activity level   Outcome: Progressing     Problem: Potential for Falls  Goal: Patient will remain free of falls  Description: INTERVENTIONS:  - Educate patient/family on patient safety including physical limitations  - Instruct patient to call for assistance with activity   - Consult OT/PT to assist with strengthening/mobility   - Keep Call bell within reach  - Keep bed low and locked with side rails adjusted as appropriate  - Keep care items and personal belongings within reach  - Initiate and maintain comfort rounds  - Make Fall Risk Sign visible to staff  - Apply yellow socks and bracelet for high fall risk patients  - Consider moving patient to room near nurses station  Outcome: Progressing     Problem: Prexisting or High Potential for Compromised Skin Integrity  Goal: Skin integrity is maintained or improved  Description: INTERVENTIONS:  - Identify patients at risk for skin breakdown  - Assess and monitor skin integrity  - Assess and monitor nutrition and hydration status  - Monitor labs   - Assess for incontinence   - Turn and reposition patient  - Assist with mobility/ambulation  - Relieve pressure over bony prominences  - Avoid friction and shearing  - Provide appropriate hygiene as needed including keeping skin clean and dry  - Evaluate need for skin moisturizer/barrier cream  - Collaborate with interdisciplinary team   - Patient/family teaching  - Consider wound care consult   Outcome: Progressing     Problem: Nutrition/Hydration-ADULT  Goal: Nutrient/Hydration intake appropriate for improving, restoring or maintaining nutritional needs  Description: Monitor and assess patient's nutrition/hydration status for malnutrition  Collaborate with interdisciplinary team and initiate plan and interventions as ordered  Monitor patient's weight and dietary intake as ordered or per policy  Utilize nutrition screening tool and intervene as necessary  Determine patient's food preferences and provide high-protein, high-caloric foods as appropriate       INTERVENTIONS:  - Monitor oral intake, urinary output, labs, and treatment plans  - Assess nutrition and hydration status and recommend course of action  - Evaluate amount of meals eaten  - Assist patient with eating if necessary   - Allow adequate time for meals  - Recommend/ encourage appropriate diets, oral nutritional supplements, and vitamin/mineral supplements  - Order, calculate, and assess calorie counts as needed  - Recommend, monitor, and adjust tube feedings and TPN/PPN based on assessed needs  - Assess need for intravenous fluids  - Provide specific nutrition/hydration education as appropriate  - Include patient/family/caregiver in decisions related to nutrition  Outcome: Progressing     Problem: METABOLIC, FLUID AND ELECTROLYTES - ADULT  Goal: Electrolytes maintained within normal limits  Description: INTERVENTIONS:  - Monitor labs and assess patient for signs and symptoms of electrolyte imbalances  - Administer electrolyte replacement as ordered  - Monitor response to electrolyte replacements, including repeat lab results as appropriate  - Instruct patient on fluid and nutrition as appropriate  Outcome: Progressing  Goal: Fluid balance maintained  Description: INTERVENTIONS:  - Monitor labs   - Monitor I/O and WT  - Instruct patient on fluid and nutrition as appropriate  - Assess for signs & symptoms of volume excess or deficit  Outcome: Progressing

## 2021-11-01 NOTE — PROGRESS NOTES
Progress Note - Infectious Disease   Andrea Kong 77 y o  female MRN: [id number]  Unit/Bed#: [facility] 929-01 Encounter: [id number]      Impression/Plan:  1  Sepsis, POA   Patient presented with progressing flank/abdominal discomfort likely related to problem 3  Clinical parameters had improved  Overall poor progress likely due to 4  Tachycardia/borderline blood pressures 10/28, antibiotics restarted given 3  Currently stable  Continue antibiotics as below  Continue to trend fever curve/WBC  Ongoing follow-up by Nephrology  Follow-up IR cultures once collected  Additional supportive care as per primary     2  Fusobacterium bacteremia   Source unknown, possibly transient given 1 or occult GI source  CT imaging of the abdomen on admission unremarkable  Repeat imaging with new collections noted in the abdomen and partial SBO, potential source   Drains placed   Patient without any teeth   Repeat blood cultures without growth   Not isolated on fluid cultures  No prior C-scope on chart review   EGD unremarkable  Imaging of the right upper quadrant ultrasound unremarkable   Completed empiric course of Flagyl  No further antibiotics for this issue  Continue to trend fever curve/WBC  Ongoing follow-up by GI  Consider C scope once more stable/outpatient  Supportive care as per primary  Continue antibiotics otherwise as below     3  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   Patient has a chronic obstructive uropathy involving the left side and recently had PCN removal   Subsequently developed bleeding at the site and hematoma on imaging likely due to chronic anticoagulation   IR aspiration and drain placement on 10/13 and earlier urine cultures with same organisms   Drains likely provided significant source control   IR drain check noted one tube dislodged   Repeat CT scan with persistent collections, uncertain if these are sterile  With changes in vitals on 10/28, antibiotics restarted     Continue renally dosed Zosyn based on prior cultures  Ongoing follow-up by IR  Will follow-up IR cultures when collected  Continue to trend fever curve/vitals  Repeat labs tomorrow  Supportive care as per primary  Tentative plan for 5-7 days of antibiotics once drains replaced     4  [place] on CKD with fistula  Patient with baseline chronic kidney disease with fistula created in the right upper extremity   Stenosis causing swelling  Joan Mchugh function worsened over entire admission  Initiated on dialysis  Monitor chemistry intermittently  Ongoing follow-up by Nephrology  Vascular surgery follow-up/imaging     5  History of C diff and diarrhea   Patient had a history of C diff in 2019  She has had repeat PCRs in July 2021 and now negative despite recent diarrhea  Atul Cabrera related to 9  Patient reports some loose stools today  Antibiotics as above  Monitor stool output  No C diff prophylaxis for now     6  Polycythemia vera and MDS  Recent drop in hemoglobin and leukopenia likely related  Ongoing management/supportive care as per primary   Recommend follow-up with Oncology as outpatient      7  Prior PE on anticoagulation   Likely risk factor for issues as above  AC as per primary        8  Transaminitis   Patient noted to have slowly increasing alkaline phosphatase and AST   In the setting of this bacteremia consider occult abscess  Now downtrending/stable   Liver/RUQ ultrasound unremarkable  Monitor LFTs  Antibiotics as above  GI evaluation noted     9  Melena   Possible upper GI bleed   Ongoing follow-up and workup as per GI   EGD reportedly unremarkable   Additional care as per primary       Above plan discussed briefly with the patient at bedside      ID consult service will continue to follow      Antibiotics:  Zosyn 5    Subjective:  She currently denies having any nausea, vomiting, chest pain or shortness of breaths  She denies any overt abdominal discomfort    Reports overall just feeling continued fatigue  Objective:  Vitals:  Temp:  [98 6 °F (37 °C)-99 °F (37 2 °C)] 99 °F (37 2 °C)  HR:  [77-84] 77  Resp:  [15-20] 15  BP: (103-118)/(64-71) 103/64  SpO2:  [98 %-99 %] 98 %  Temp (24hrs), Av 8 °F (37 1 °C), Min:98 6 °F (37 °C), Max:99 °F (37 2 °C)  Current: Temperature: 99 °F (37 2 °C)    Physical Exam:   General Appearance:  Alert, interactive, nontoxic, no acute distress  Chronically ill-appearing and frail  Becomes easily frustrated with questions  Throat: Oropharynx moist without lesions  Lungs:   Clear to auscultation bilaterally; no wheezes, rhonchi or rales; respirations unlabored on nasal cannula   Heart:  RRR; no murmur, rub or gallop appreciated   Abdomen:   Soft, non-tender, non-distended, positive bowel sounds  Extremities: No clubbing, cyanosis; continued pitting edema in the lower and upper extremities  Skin: No new rashes or lesions on exposed skin  No new draining wounds noted on exposed skin         Labs, Imaging, & Other studies:   All pertinent labs and imaging studies were personally reviewed  Results from last 7 days   Lab Units 21  0328 10/30/21  0508 10/29/21  0759   WBC Thousand/uL 4 00* 3 15* 2 90*   HEMOGLOBIN g/dL 8 4* 8 0* 6 1*   PLATELETS Thousands/uL 172 184 159     Results from last 7 days   Lab Units 10/27/21  0555   POTASSIUM mmol/L 4 0   CHLORIDE mmol/L 102   CO2 mmol/L 28   BUN mg/dL 16   CREATININE mg/dL 2 90*   EGFR ml/min/1 73sq m 15   CALCIUM mg/dL 8 4

## 2021-11-02 ENCOUNTER — APPOINTMENT (INPATIENT)
Dept: DIALYSIS | Facility: HOSPITAL | Age: 76
DRG: 981 | End: 2021-11-02
Payer: COMMERCIAL

## 2021-11-02 LAB
ANION GAP SERPL CALCULATED.3IONS-SCNC: 12 MMOL/L (ref 4–13)
BUN SERPL-MCNC: 22 MG/DL (ref 5–25)
CALCIUM SERPL-MCNC: 8.6 MG/DL (ref 8.3–10.1)
CHLORIDE SERPL-SCNC: 103 MMOL/L (ref 100–108)
CO2 SERPL-SCNC: 21 MMOL/L (ref 21–32)
CREAT SERPL-MCNC: 4.65 MG/DL (ref 0.6–1.3)
ERYTHROCYTE [DISTWIDTH] IN BLOOD BY AUTOMATED COUNT: 18 % (ref 11.6–15.1)
GFR SERPL CREATININE-BSD FRML MDRD: 9 ML/MIN/1.73SQ M
GLUCOSE SERPL-MCNC: 71 MG/DL (ref 65–140)
HCT VFR BLD AUTO: 27 % (ref 34.8–46.1)
HGB BLD-MCNC: 8.4 G/DL (ref 11.5–15.4)
MCH RBC QN AUTO: 29.5 PG (ref 26.8–34.3)
MCHC RBC AUTO-ENTMCNC: 31.1 G/DL (ref 31.4–37.4)
MCV RBC AUTO: 95 FL (ref 82–98)
PLATELET # BLD AUTO: 207 THOUSANDS/UL (ref 149–390)
PMV BLD AUTO: 10.2 FL (ref 8.9–12.7)
POTASSIUM SERPL-SCNC: 3.5 MMOL/L (ref 3.5–5.3)
RBC # BLD AUTO: 2.85 MILLION/UL (ref 3.81–5.12)
SODIUM SERPL-SCNC: 136 MMOL/L (ref 136–145)
WBC # BLD AUTO: 4.12 THOUSAND/UL (ref 4.31–10.16)

## 2021-11-02 PROCEDURE — 99232 SBSQ HOSP IP/OBS MODERATE 35: CPT | Performed by: GENERAL PRACTICE

## 2021-11-02 PROCEDURE — 99232 SBSQ HOSP IP/OBS MODERATE 35: CPT | Performed by: NURSE PRACTITIONER

## 2021-11-02 PROCEDURE — 97530 THERAPEUTIC ACTIVITIES: CPT

## 2021-11-02 PROCEDURE — 99232 SBSQ HOSP IP/OBS MODERATE 35: CPT | Performed by: INTERNAL MEDICINE

## 2021-11-02 PROCEDURE — 85027 COMPLETE CBC AUTOMATED: CPT | Performed by: INTERNAL MEDICINE

## 2021-11-02 PROCEDURE — 80048 BASIC METABOLIC PNL TOTAL CA: CPT | Performed by: INTERNAL MEDICINE

## 2021-11-02 RX ADMIN — HEPARIN SODIUM 5000 UNITS: 5000 INJECTION INTRAVENOUS; SUBCUTANEOUS at 23:03

## 2021-11-02 RX ADMIN — HEPARIN SODIUM 5000 UNITS: 5000 INJECTION INTRAVENOUS; SUBCUTANEOUS at 05:20

## 2021-11-02 RX ADMIN — PIPERACILLIN AND TAZOBACTAM 2.25 G: 2; .25 INJECTION, POWDER, FOR SOLUTION INTRAVENOUS at 05:20

## 2021-11-02 RX ADMIN — METOPROLOL TARTRATE 25 MG: 25 TABLET, FILM COATED ORAL at 08:23

## 2021-11-02 RX ADMIN — PIPERACILLIN AND TAZOBACTAM 2.25 G: 2; .25 INJECTION, POWDER, FOR SOLUTION INTRAVENOUS at 11:00

## 2021-11-02 RX ADMIN — ATORVASTATIN CALCIUM 40 MG: 40 TABLET, FILM COATED ORAL at 23:03

## 2021-11-02 RX ADMIN — MELATONIN TAB 3 MG 3 MG: 3 TAB at 23:03

## 2021-11-02 RX ADMIN — CITALOPRAM HYDROBROMIDE 20 MG: 20 TABLET ORAL at 08:23

## 2021-11-02 RX ADMIN — HEPARIN SODIUM 5000 UNITS: 5000 INJECTION INTRAVENOUS; SUBCUTANEOUS at 13:07

## 2021-11-02 RX ADMIN — PANTOPRAZOLE SODIUM 40 MG: 40 TABLET, DELAYED RELEASE ORAL at 05:19

## 2021-11-02 RX ADMIN — PIPERACILLIN AND TAZOBACTAM 2.25 G: 2; .25 INJECTION, POWDER, FOR SOLUTION INTRAVENOUS at 23:03

## 2021-11-02 RX ADMIN — CALCITRIOL 0.25 MCG: 0.25 CAPSULE, LIQUID FILLED ORAL at 08:24

## 2021-11-02 RX ADMIN — METOPROLOL TARTRATE 25 MG: 25 TABLET, FILM COATED ORAL at 17:13

## 2021-11-02 RX ADMIN — LEVOTHYROXINE SODIUM 75 MCG: 75 TABLET ORAL at 08:23

## 2021-11-02 RX ADMIN — PANTOPRAZOLE SODIUM 40 MG: 40 TABLET, DELAYED RELEASE ORAL at 17:13

## 2021-11-02 RX ADMIN — Medication 250 MG: at 08:23

## 2021-11-02 RX ADMIN — PIPERACILLIN AND TAZOBACTAM 2.25 G: 2; .25 INJECTION, POWDER, FOR SOLUTION INTRAVENOUS at 17:13

## 2021-11-02 NOTE — ASSESSMENT & PLAN NOTE
· Eliquis for anticoagulation, currently on hold for replacement on abscess drainage tube today  · Tomorrow if Hgb stable will restart Eliquis

## 2021-11-02 NOTE — ASSESSMENT & PLAN NOTE
· Presented with left renal hematoma  Pigtail catheter is noted medial to kidney  Renal pelvis may be collapsed  Hemorrhage and thickening extending in the combined interfascial place of retrospective as well as some high density fluid within     · S/p IR drainage 10/12/21 with JOSE ANTONIO drain placement x 2    · S/p repeat IR drainage 11/1 - f/u Cx  · Abx through 7 days post-drainage  · Before removing drain will need CT or tube check

## 2021-11-02 NOTE — ASSESSMENT & PLAN NOTE
· Hx of Polycythemia vera and MDS  · Significant anemia secondary to blood loss in the past    · The patient is currently off of the [facility] treatment for her PV and getting mainly Aranesp on every 28 day basis    · Hematology consulted for evaluation, recommend once patient is better/ stable, discharge, she will follow-up with Dr Ramon Vines, her primary hematologist

## 2021-11-02 NOTE — PROGRESS NOTES
Progress Note -Interventional Radiology PA  Susan Falcon 77 y o  female MRN: [id number]  Unit/Bed#: [facility] 929-01 Encounter: [id number]    Assessment/Plan:    77year old female well known to IR, s/p left perinephric/subcapsular abscess collection re-insertion on 11/1/2021 after eliquis hold  10f drain placed with return of 5 mL of pus  Cultures pending       24 hour output: 30 mL serosanguinous     - record I/O's  - flush tube with 3 mL NS Q8 hours  - follow up on culture results   - ok to resume eliquis    Patient Active Problem List   Diagnosis    History of pulmonary embolism    Bladder mass    [facility] bowel obstruction ([facility]    Obstructive uropathy    Secondary hyperparathyroidism of renal origin ([facility] )    Essential hypertension    Polycythemia vera ([facility]    Intraventricular hemorrhage (HCC)    Anemia of chronic disease    Mass of urethra    Sepsis on admission    Acute renal failure superimposed on CKD stage 5    Thoracic compression fracture (HCC)    Benign neoplasm of supratentorial region of brain ([facility] )    Meningioma ([facility] )    Inverted T wave    Polycythemia    History of Clostridioides difficile colitis    History of shingles    Depression    Class 2 severe obesity due to excess calories with serious comorbidity and body mass index (BMI) of 35 0 to 35 9 in adult [facility])    Chronic thrombosis of subclavian vein (HCC)    Hyperlipemia    Gross hematuria    Hydronephrosis of left kidney    Anemia    Constipation    Obstructive left pyelonephritis    Right upper quadrant cyst    Obesity, morbid (HCC)    Febrile illness    COVID-19    Edema of right upper extremity    Stage 5 chronic kidney disease not on chronic dialysis ([facility] )    Ambulatory dysfunction    Iron deficiency anemia due to chronic blood loss    Poor venous access    Perinephric abscess    Pleural effusion    Swelling of right upper extremity    Acute renal failure (HCC)    Hypothyroidism    Bacteremia Subjective: Justino Tenorio is a 77 y o  female who presented with left renal hematoma and sepsis    Patient is feeling better s/p drain replacement  She is upset about how swollen her r arm with her fistula is  Objective:    Vitals:  /58   Pulse 82   Temp 98 1 °F (36 7 °C)   Resp 18   Ht 5' (1 524 m)   Wt 84 5 kg (186 lb 4 6 oz) Comment: post dialysis weight on 10/30  SpO2 93%   BMI 36 38 kg/m²   Body mass index is 36 38 kg/m²  Weight (last 2 days)     Date/Time   Weight    11/01/21 1712   84 5 (186 29)    Weight: post dialysis weight on 10/30 at 11/01/21 1712              I/Os:    Intake/Output Summary (Last 24 hours) at 11/2/2021 1657  Last data filed at 11/2/2021 1522  Gross per 24 hour   Intake 530 ml   Output 225 ml   Net 305 ml       Invasive Devices     Peripherally Inserted Central Catheter Line            PICC Line 10/11/21 21 days          Line            Hemodialysis AV Fistula 09/30/21 Right Upper arm 33 days          Hemodialysis Catheter            HD Permanent Double Catheter 13 days          Drain            Urostomy Ileal conduit RUQ 1854 days    Closed/Suction Drain Left Other (Comment) Bulb 10 Fr  6 days    Closed/Suction Drain Left Back Bulb 10 Fr  <1 day                Physical Exam:  Physical Exam  Vitals and nursing note reviewed  Constitutional:       General: She is not in acute distress  Appearance: She is well-developed  HENT:      Head: Normocephalic and atraumatic  Eyes:      Conjunctiva/sclera: Conjunctivae normal    Cardiovascular:      Rate and Rhythm: Normal rate and regular rhythm  Heart sounds: No murmur heard  Pulmonary:      Effort: Pulmonary effort is normal  No respiratory distress  Breath sounds: Normal breath sounds  Decreased air movement present  Abdominal:      General: Bowel sounds are normal       Palpations: Abdomen is soft  Tenderness: There is no abdominal tenderness     Musculoskeletal:      Cervical back: Neck supple  Right lower leg: Edema present  Left lower leg: Edema present  Skin:     General: Skin is warm and dry  Capillary Refill: Capillary refill takes less than 2 seconds  Findings: Ecchymosis (R arm) present  Comments: 2 left back drains with serosang drainage   Neurological:      Mental Status: She is alert  Psychiatric:         Mood and Affect: Mood normal          Thought Content:  Thought content normal                   Lab Results and Cultures:   CBC with diff:   Lab Results   Component Value Date    WBC 4 12 (L) 11/02/2021    HGB 8 4 (L) 11/02/2021    HCT 27 0 (L) 11/02/2021    MCV 95 11/02/2021     11/02/2021    MCH 29 5 11/02/2021    MCHC 31 1 (L) 11/02/2021    RDW 18 0 (H) 11/02/2021    MPV 10 2 11/02/2021    NRBC 0 10/29/2021      BMP/CMP:  Lab Results   Component Value Date     12/17/2015    K 3 5 11/02/2021    K 3 7 12/17/2015     11/02/2021     12/17/2015    CO2 21 11/02/2021    CO2 28 10/04/2016    ANIONGAP 9 12/17/2015    BUN 22 11/02/2021    BUN 8 12/17/2015    CREATININE 4 65 (H) 11/02/2021    CREATININE 0 95 12/17/2015    GLUCOSE 138 10/04/2016    GLUCOSE 96 12/17/2015    CALCIUM 8 6 11/02/2021    CALCIUM 8 9 12/17/2015    AST 51 (H) 10/22/2021     (H) 11/06/2015    ALT <6 (L) 10/22/2021    ALT 25 11/06/2015    ALKPHOS 206 (H) 10/22/2021    ALKPHOS 215 (H) 11/06/2015    PROT 6 2 (L) 11/06/2015    BILITOT 0 49 11/06/2015    EGFR 9 11/02/2021   ,     Coags:   Lab Results   Component Value Date    PTT 50 (H) 10/29/2021    PTT 50 (H) 10/31/2015    INR 1 45 (H) 11/01/2021    INR 1 42 (H) 11/03/2015   ,   Results from last 7 days   Lab Units 11/01/21  0328 10/29/21  0759 10/28/21  0931   PTT seconds  --  50*  --    INR  1 45* 2 00* 1 50*        Lipid Panel:   Lab Results   Component Value Date    CHOL 175 05/20/2015     Lab Results   Component Value Date    HDL 70 (H) 04/18/2019     Lab Results   Component Value Date    HDL 70 (H) 04/18/2019     Lab Results   Component Value Date    LDLCALC 125 (H) 04/18/2019     Lab Results   Component Value Date    TRIG 110 04/18/2019       HgbA1c:   Lab Results   Component Value Date    HGBA1C 5 5 09/02/2020    HGBA1C 5 1 05/20/2015       Blood Culture:   Lab Results   Component Value Date    BLOODCX No Growth After 5 Days  10/12/2021    BLOODCX No Growth After 5 Days  10/12/2021   ,   Urinalysis:   Lab Results   Component Value Date    COLORU Dk Yellow 10/10/2021    COLORU Yellow 09/09/2015    CLARITYU Turbid 10/10/2021    CLARITYU Cloudy 09/09/2015    SPECGRAV 1 018 10/10/2021    SPECGRAV <=1 005 09/09/2015    PHUR 8 5 (A) 10/10/2021    PHUR 8 5 (H) 07/15/2021    PHUR 6 0 09/09/2015    LEUKOCYTESUR Moderate (A) 10/10/2021    LEUKOCYTESUR Large (A) 09/09/2015    NITRITE Negative 10/10/2021    NITRITE Positive (A) 09/09/2015    PROTEINUA 30 (1+) (A) 09/09/2015    GLUCOSEU Negative 10/10/2021    GLUCOSEU Negative 09/09/2015    KETONESU Negative 10/10/2021    KETONESU Negative 09/09/2015    BILIRUBINUR Negative 10/10/2021    BILIRUBINUR Negative 09/09/2015    BLOODU Large (A) 10/10/2021    BLOODU Moderate (A) 09/09/2015   ,   Urine Culture:   Lab Results   Component Value Date    URINECX >100,000 cfu/ml Klebsiella pneumoniae (A) 10/10/2021    URINECX >100,000 cfu/ml Enterobacter aerogenes (A) 10/10/2021    URINECX 20,000-29,000 cfu/ml Enterococcus faecalis (A) 10/10/2021    URINECX <10,000 cfu/ml Enterococcus avium (A) 10/10/2021   ,   Wound Culure:  No results found for: WOUNDCULT    VTE Pharmacologic Prophylaxis: Heparin      Thank you for allowing me to participate in the care of [facility]  Please don't hesitate to call, text, email, or TigerText with any questions  This text is generated with voice recognition software  There may be translation, syntax,  or grammatical errors  If you have any questions, please contact the dictating provider

## 2021-11-02 NOTE — PLAN OF CARE
Target Uf goal 1L as toelrated  Patient dialyzing for 3 5 hours on 4K bath for serum K of 3 5 per protocol  Post-Dialysis RN Treatment Note    Blood Pressure:  Pre 129/70 mm/Hg  Post 118/64 mmHg   EDW  TBD   Weight:  Pre 82 5 kg   Post 81 2 kg   Mode of weight measurement: Bed Scale   Volume Removed  1003 ml    Treatment duration 3 hours and  30 minutes    NS given  No    Treatment shortened? No   Medications given during Rx Zosyn SEE MAR   Estimated Kt/V  1 52   Access type: Permacath/TDC   Access Status: Yes, describe: BFR at 350     Report called to primary nurse   Yes, Estrella Sal RN         Problem: METABOLIC, FLUID AND ELECTROLYTES - ADULT  Goal: Electrolytes maintained within normal limits  Description: INTERVENTIONS:  - Monitor labs and assess patient for signs and symptoms of electrolyte imbalances  - Administer electrolyte replacement as ordered  - Monitor response to electrolyte replacements, including repeat lab results as appropriate  - Instruct patient on fluid and nutrition as appropriate  Outcome: Progressing  Goal: Fluid balance maintained  Description: INTERVENTIONS:  - Monitor labs   - Monitor I/O and WT  - Instruct patient on fluid and nutrition as appropriate  - Assess for signs & symptoms of volume excess or deficit  Outcome: Progressing

## 2021-11-02 NOTE — PROGRESS NOTES
[facility]  Progress Note - Haresh De Paz [birth year], 77 y o  female MRN: [id number]  Unit/Bed#: [facility] 929-01 Encounter: [id number]  Primary Care Provider: Birgit Richmond MD   Date and time admitted to hospital: 10/9/2021  2:19 PM    * Sepsis on admission  Assessment & Plan  · Previously with fever coupled with tachycardia and elevated procalcitonin  · Likely secondary to infected left renal hematoma/perinephric abscess -> Fusobacterium bacteremia noted - fluid culture from 10/13 s/p IR-guided drainage growing Enterobacter/Enterococcus    Bacteremia  Assessment & Plan  · Blood cultures from 10/9 growing Fusobacterium - IV Zosyn transitioned to PO Flagyl per Infectious Disease  · Repeat blood cultures 10/12 are negative    Perinephric abscess  Assessment & Plan  · Presented with left renal hematoma  Pigtail catheter is noted medial to kidney  Renal pelvis may be collapsed  Hemorrhage and thickening extending in the combined interfascial place of retrospective as well as some high density fluid within  · S/p IR drainage 10/12/21 with JOSE ANTONIO drain placement x 2    · S/p repeat IR drainage 11/1 - f/u Cx  · Abx through 7 days post-drainage  · Before removing drain will need CT or tube check          Swelling of right upper extremity  Assessment & Plan  · Underwent repeat RUE vascular doppler study noting "a narrowing in the subclavian vein at the clavicle created elevated  PSV and turbulent flow  The dialysis AVF appears to be matured with adequate diameter, flow volumes and less than 6mm in depth throughout the arm  Antegrade, high resistant blunted flow noted in the peripheral arteries  No evidence of outflow obstruction  No evidence of a pseudoaneurysm    No evidence of a large venous branch "  · 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  · S/p fistulagram  · Improved with HD    Pleural effusion  Assessment & Plan  Mild to moderate pleural effusion on CXR  S/p thoracentesis by IR 10/11/21   -patient currently comfortable on room air, chest x-ray from prior shows persistent left pleural effusion      Acute renal failure superimposed on CKD stage 5  Assessment & Plan  · Progressively worsening renal failure  · Dialysis TTS    Anemia of chronic disease  Assessment & Plan  · Monitor hemoglobin  · s/p 2 units of RBCs on 10/29  · Hb improved    Polycythemia vera ([address] )  Assessment & Plan  · Hx of Polycythemia vera and MDS  · Significant anemia secondary to blood loss in the past    · The patient is currently off of the [facility] treatment for her PV and getting mainly Aranesp on every 28 day basis  · Hematology consulted for evaluation, recommend once patient is better/ stable, discharge, she will follow-up with Dr Fernanda Gibbons, her primary hematologist     Essential hypertension  Assessment & Plan  · Monitor blood pressures  · Avoid hypotension    Obstructive uropathy  Assessment & Plan  · Obstructive nephropathy  · Chronic bilateral hydronephrosis stents were recently removed  · Urology and Nephrology following; recommend continuing antibiotics and present treatment      History of pulmonary embolism  Assessment & Plan  · Eliquis for anticoagulation, currently on hold for replacement on abscess drainage tube today  · Tomorrow if Hgb stable will restart Eliquis      VTE Pharmacologic Prophylaxis: VTE Score: 13 High Risk (Score >/= 5) - Pharmacological DVT Prophylaxis Ordered: heparin  Sequential Compression Devices Ordered  Patient Centered Rounds: I performed bedside rounds with nursing staff today  Discussions with Specialists or Other Care Team Provider: IR    Education and Discussions with Family / Patient: Updated  (son) via phone  Time Spent for Care: 30 minutes  More than 50% of total time spent on counseling and coordination of care as described above      Current Length of Stay: 24 day(s)  Current Patient Status: Inpatient Certification Statement: The patient will continue to require additional inpatient hospital stay due to need for drain and IV abx  Discharge Plan: Anticipate discharge in >72 hrs to rehab facility  Code Status: Level 1 - Full Code    Subjective:   No acute complaints    Objective:     Vitals:   Temp (24hrs), Av °F (36 7 °C), Min:97 °F (36 1 °C), Max:99 °F (37 2 °C)    Temp:  [97 °F (36 1 °C)-99 °F (37 2 °C)] 97 6 °F (36 4 °C)  HR:  [68-80] 80  Resp:  [15-18] 18  BP: ()/(28-75) 118/64  SpO2:  [93 %-100 %] 93 %  Body mass index is 36 38 kg/m²  Input and Output Summary (last 24 hours): Intake/Output Summary (Last 24 hours) at 2021 1445  Last data filed at 2021 1215  Gross per 24 hour   Intake 530 ml   Output 85 ml   Net 445 ml       Physical Exam:   Physical Exam  HENT:      Head: Normocephalic and atraumatic  Nose: Nose normal       Mouth/Throat:      Mouth: Mucous membranes are moist    Eyes:      Extraocular Movements: Extraocular movements intact  Conjunctiva/sclera: Conjunctivae normal    Cardiovascular:      Rate and Rhythm: Normal rate and regular rhythm  Pulmonary:      Effort: Pulmonary effort is normal       Breath sounds: Normal breath sounds  No wheezing or rales  Abdominal:      General: Bowel sounds are normal  There is no distension  Palpations: Abdomen is soft  Tenderness: There is no abdominal tenderness  Musculoskeletal:         General: Normal range of motion  Cervical back: Normal range of motion and neck supple  Right lower leg: No edema  Left lower leg: No edema  Skin:     General: Skin is warm and dry  Neurological:      Mental Status: She is alert and oriented to person, place, and time           Additional Data:     Labs:  Results from last 7 days   Lab Units 21  0520 10/29/21  0609   WBC Thousand/uL 4 12* 3 13*   HEMOGLOBIN g/dL 8 4* 5 9*   HEMATOCRIT % 27 0* 19 4*   PLATELETS Thousands/uL 207 164   NEUTROS PCT %  --  64   LYMPHS PCT %  --  12*   MONOS PCT %  --  22*   EOS PCT %  --  1     Results from last 7 days   Lab Units 11/02/21  0520   SODIUM mmol/L 136   POTASSIUM mmol/L 3 5   CHLORIDE mmol/L 103   CO2 mmol/L 21   BUN mg/dL 22   CREATININE mg/dL 4 65*   ANION GAP mmol/L 12   CALCIUM mg/dL 8 6   GLUCOSE RANDOM mg/dL 71     Results from last 7 days   Lab Units 11/01/21  0328   INR  1 45*                   Lines/Drains:  Invasive Devices     Peripherally Inserted Central Catheter Line            PICC Line 10/11/21 21 days          Line            Hemodialysis AV Fistula 09/30/21 Right Upper arm 33 days          Hemodialysis Catheter            HD Permanent Double Catheter 12 days          Drain            Urostomy Ileal conduit RUQ 1854 days    Closed/Suction Drain Left Other (Comment) Bulb 10 Fr  6 days    Closed/Suction Drain Left Back Bulb 10 Fr  <1 day                Central Line:  Goal for removal: N/A - Discharging with PICC for IV ABX/medications             Imaging: No pertinent imaging reviewed      Recent Cultures (last 7 days):   Results from last 7 days   Lab Units 11/01/21 2004   GRAM STAIN RESULT  2+ Polys  No bacteria seen       Last 24 Hours Medication List:   Current Facility-Administered Medications   Medication Dose Route Frequency Provider Last Rate    acetaminophen  650 mg Oral Q6H PRN Alda Pitts MD      atorvastatin  40 mg Oral HS Alda Pitts MD      calcitriol  0 25 mcg Oral Daily Alda Pitts MD      cholecalciferol  800 Units Oral Daily Alda Pitts MD      citalopram  20 mg Oral Daily Alda Pitts MD      heparin (porcine)  5,000 Units Subcutaneous [facility] Daryl Doherty MD      levothyroxine  75 mcg Oral Daily Alda Pitts MD      melatonin  3 mg Oral HS Alda Pitts MD      metoprolol  2 5 mg Intravenous Q6H PRN Fe Lao PA-C      metoprolol tartrate  25 mg Oral BID Fe Lao PA-C      ondansetron  4 mg Intravenous Q6H PRN Finesse Jaramillo MD      pantoprazole  40 mg Oral BID AC Mumtaz Laguerre DO      piperacillin-tazobactam  2 25 g Intravenous Q6H Camryn Correia MD 2 25 g (11/02/21 1100)    saccharomyces boulardii  250 mg Oral Daily Finesse Jaramillo MD          Today, Patient Was Seen By: Los Jessica DO    **Please Note: This note may have been constructed using a voice recognition system  **

## 2021-11-02 NOTE — PLAN OF CARE
Problem: PHYSICAL THERAPY ADULT  Goal: Performs mobility at highest level of function for planned discharge setting  See evaluation for individualized goals  Description: Treatment/Interventions: Patient/family training, LE strengthening/ROM, Bed mobility, Spoke to nursing, Spoke to case management, OT  Equipment Recommended:  (TBD )       See flowsheet documentation for full assessment, interventions and recommendations  Outcome: Progressing  Note: Prognosis: Fair  Problem List: Decreased strength, Decreased endurance, Impaired balance, Decreased mobility, Decreased cognition, Decreased skin integrity, Obesity  Assessment: The pt  was notably limited due to fatigue after hemodialysis  She declined sitting at the side of the bed or standing, but she was agreeable to therapeutic exercise  She expressed concern about her RUE, and instructed her in hand squeezes and elbow ROM as well as to maintain her RUE elevated to reduce the edema  She was very appreciative of this  She was able to come into long-sitting in order to reposition her and place a blanket for her comfort  She does appear more lively than she has over the past several days, but she continues to fatigue very easily  She had all needs within reach and BUE were elevated on pillows post session  Barriers to Discharge: Inaccessible home environment, Decreased caregiver support        PT Discharge Recommendation: Post acute rehabilitation services     PT - OK to Discharge: Yes (TO REHAB WHEN MED BRODERICK )    See flowsheet documentation for full assessment

## 2021-11-02 NOTE — PROGRESS NOTES
Progress Note - Infectious Disease   Naz Brown 77 y o  female MRN: [id number]  Unit/Bed#: [facility] 929-01 Encounter: [id number]      Impression/Plan:  1  Sepsis, POA   Patient presented with progressing flank/abdominal discomfort likely related to problem 3  Clinical parameters had improved  Overall poor progress likely due to 4  Tachycardia/borderline blood pressures 10/28, antibiotics restarted given 3  Currently stable  Continue antibiotics as below  Continue to trend fever curve/WBC  Ongoing follow-up by Nephrology  Follow-up IR cultures  Additional supportive care as per primary     2  Fusobacterium bacteremia   Source unknown, possibly transient given 1 or occult GI source  CT imaging of the abdomen on admission unremarkable  Repeat imaging with new collections noted in the abdomen and partial SBO, potential source   Drains placed   Patient without any teeth   Repeat blood cultures without growth   Not isolated on fluid cultures  No prior C-scope on chart review   EGD unremarkable  Imaging of the right upper quadrant ultrasound unremarkable   Completed empiric course of Flagyl    No further antibiotics for this issue  Continue to trend fever curve/WBC  Ongoing follow-up by GI  Consider C scope once more stable/outpatient  Supportive care as per primary  Continue antibiotics otherwise as below     3  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   Patient has a chronic obstructive uropathy involving the left side and recently had PCN removal   Subsequently developed bleeding at the site and hematoma on imaging likely due to chronic anticoagulation   IR aspiration and drain placement on 10/13 and earlier urine cultures with same organisms   Drains likely provided significant source control   IR drain check noted one tube dislodged   Repeat CT scan with persistent collections, uncertain if these are sterile  With changes in vitals on 10/28, antibiotics restarted    Continue renally dosed Zosyn based on prior cultures  Ongoing follow-up by IR  Will follow-up IR cultures when collected  Continue to trend fever curve/vitals  Repeat labs tomorrow  Supportive care as per primary  Tentative plan for 7 days of antibiotic post drain placement  Recommend repeat CT images or IR drain check prior to stopping antibiotics      4  Vergil Else on CKD with fistula  Patient with baseline chronic kidney disease with fistula created in the right upper extremity   Stenosis causing swelling   Kidney function worsened over entire admission   Initiated on dialysis  Monitor chemistry intermittently  Ongoing follow-up by Nephrology  Vascular surgery follow-up/imaging     5  History of C diff and diarrhea   Patient had a history of C diff in 2019  She has had repeat PCRs in July 2021 and now negative despite recent diarrhea  Trudy Jace related to 9  Patient reports some loose stools today  Antibiotics as above  Monitor stool output  No C diff prophylaxis for now     6  Polycythemia vera and MDS   Recent drop in hemoglobin and leukopenia likely related  Ongoing management/supportive care as per primary   Recommend follow-up with Oncology as outpatient      7  Prior PE on anticoagulation   Likely risk factor for issues as above  AC as per primary        8  Transaminitis   Patient noted to have slowly increasing alkaline phosphatase and AST   In the setting of this bacteremia consider occult abscess  Now downtrending/stable   Liver/RUQ ultrasound unremarkable  Monitor LFTs  Antibiotics as above  GI evaluation noted     9  Melena   Possible upper GI bleed   Ongoing follow-up and workup as per GI   EGD reportedly unremarkable   Additional care as per primary       Above plan discussed briefly with the patient at bedside      ID consult service will continue to follow      Antibiotics:  Zosyn 6    Subjective:  Patient seen during dialysis and she denied having any nausea, vomiting, chest pain  Tolerating current antibiotic without issue  She reports still having ongoing fatigue  IR procedure notes reviewed  Objective:  Vitals:  Temp:  [97 °F (36 1 °C)-99 °F (37 2 °C)] 97 6 °F (36 4 °C)  HR:  [68-80] 80  Resp:  [15-18] 18  BP: ()/(28-75) 118/64  SpO2:  [93 %-100 %] 93 %  Temp (24hrs), Av °F (36 7 °C), Min:97 °F (36 1 °C), Max:99 °F (37 2 °C)  Current: Temperature: 97 6 °F (36 4 °C)    Physical Exam:   General Appearance:  Alert, interactive, nontoxic, no acute distress  Chronically ill-appearing and volume overloaded  Throat: Oropharynx moist without lesions  Lungs:   Clear to auscultation bilaterally; no wheezes, rhonchi or rales; respirations unlabored on room air   Heart:  RRR; no murmur, rub or gallop appreciated   Abdomen:   Soft, non-tender, non-distended, positive bowel sounds  Drains in place with some serosanguineous drainage  Extremities: No clubbing, cyanosis; continued edema in the right upper extremity greater than the left  Continued pitting edema lower legs bilaterally   Skin: No new rashes or lesions  No new draining wounds noted  Persistent ecchymoses and skin changes in the right upper extremity  No changes at catheter sites on the chest wall         Labs, Imaging, & Other studies:   All pertinent labs and imaging studies were personally reviewed  Results from last 7 days   Lab Units 21  0520 21  0328 10/30/21  0508   WBC Thousand/uL 4 12* 4 00* 3 15*   HEMOGLOBIN g/dL 8 4* 8 4* 8 0*   PLATELETS Thousands/uL 207 172 184     Results from last 7 days   Lab Units 21  0520   POTASSIUM mmol/L 3 5   CHLORIDE mmol/L 103   CO2 mmol/L 21   BUN mg/dL 22   CREATININE mg/dL 4 65*   EGFR ml/min/1 73sq m 9   CALCIUM mg/dL 8 6     Results from last 7 days   Lab Units 21   GRAM STAIN RESULT  2+ Polys  No bacteria seen

## 2021-11-02 NOTE — DISCHARGE INSTRUCTIONS
TUBE CARE INSTRUCTIONS    Care after your procedure:    Resume your normal diet  Small sips of flat soda will help with nausea  1  The properly functioning catheter should be forward flushed once (1x) daily with 10ml of normal saline using clean technique  You will be given a prescription for flushes  To flush the tube, clean both connections with alcohol swab  Twist off the drainage bag/ bulb  tubing and twist the saline syringe into the drainage tube and flush  Remove the syringe and twist the drainage bag / bulb tubing tubing back on     2  The drainage bag/bulb may be emptied as necessary  Keep a record of the amount of fluid you drain from your tube  This should be done with clean technique as well  3  A fresh dressing should be applied daily over the tube insertion site  4  As the tube is secured to the skin with only a suture,try not to pull on your tube  Tub baths are not permitted  Showers are permitted if the patient's skin entry site is prevented from getting wet  Similarly, washcloth "baths" are acceptable  Contact Interventional Radiology at [phone] Bert PATIENTS: Contact Interventional Radiology at [phone]) Mikael Alfaro PATIENTS: Contact Interventional Radiology at [phone]) if:    1  Leakage or large amounts of liquid around the catheter  2  Fever of 101 degrees lasting several hours without other obvious cause (such as sore throat, flu, etc)  3  Persistent nausea or vomiting  4  Diminished drainage, which may be associated with pressure or pain  Or when the     drainage from your tube is less than 10mls for 48 hours  5  Catheter pulled back or falls out  The following pharmacies carry the flush syringes         [facility]                     [facility]  [address]                         [address] [facility]  Phone [phone]            Phone [phone]   [facility]                                [phone]  [address] [facility] Deena MARIE                      [address]  Phone [phone]            Phone [phone]                      Tatyana Osborn                                                                                                          [phone]  [facility]  [address]  [address] [facility]  Phone [phone]          [employer]   WHAT YOU NEED TO KNOW:   Your arm or leg my  be sore, swollen, and bruised after the procedure  This is normal and should get better in a few days  DISCHARGE INSTRUCTIONS:     Contact Interventional Radiology at [phone] Bert PATIENTS: Contact Interventional Radiology at [phone]) Lucia Cavazos PATIENTS: Contact Interventional Radiology at [phone]) if:    · You have a fever or chills  · Your puncture site is red, swollen, or draining pus  · You have nausea or are vomiting  · Your skin is itchy, swollen, or you have a rash  · You cannot feel a thrill over your graft or fistula  · You have questions or concerns about your condition or care  Seek care immediately if:     · You have bleeding that does not stop after 10 minutes of holding firm, direct pressure over the puncture site  · Blood soaks through your bandage  · Your hand or foot closest to the graft or fistula feels cold, painful, or numb  · Your hand or foot closest to the graft or fistula is pale or blue  · You have trouble moving your arm or leg closest to the graft or fistula       · Your bruise suddenly gets bigger  Care for your wound as directed:  Remove the bandage in 4 to 6 hours or as         directed  Wash the area once a day with soap and water  Gently pat the area dry  Apply firm, steady pressure to the puncture site if it bleeds  Use a clean gauze or towel to hold pressure for 10 to 15 minutes  Call 911 if you cannot stop the bleeding or the bleeding gets heavier  Feel for a thrill once a day or as directed  Place your index and second finger over your fistula or graft as directed  You should feel a vibration  The vibration means that blood is flowing through your graft or fistula correctly  Rest your arm or leg as directed  Do not lift anything heavier than 5 pounds or do strenuous activity for 24 hours  Prevent damage to your graft or fistula  Do not wear tight-fitting clothing over your graft or fistula  Do not wear tight jewelry on the arm or leg with the graft or fistula  Tell healthcare providers not to do, IVs, blood draws, and blood pressure readings in the arm with your graft or fistula  Do not allow flu shots or vaccinations in your arm with your graft or fistula  Follow up with your healthcare provider as directedPerma-cath Placement   WHAT [place]:   A perma-cath is a catheter placed through a vein into or near your right atrium  Your right atrium is the right upper chamber of your heart  A perma-cath is used for dialysis in an emergency or until a long-term device is ready to use  After your procedure, you will have some pain and swelling on your chest and neck  You may have some bruises on your chest and neck  You may also have 2 dressings, one on your chest and one on your neck  DISCHARGE INSTRUCTIONS:   Call 911 for any of the following:   · You feel lightheaded, short of breath, and have chest pain      · Your catheter comes out   Contact Interventional Radiology at [phone] Bert PATIENTS: Contact Interventional Radiology at [phone]) John Paul Goodman PATIENTS: Contact Interventional Radiology at [phone]) if:  · Blood soaks through your bandage  · You have new swelling in your arm, neck, face, or chest on your right side  · Your catheter gets wet  · Your bruises or pain get worse  · You have a fever or chills  · Persistent nausea or vomiting  · Your incision is red, swollen, or draining pus  · You have questions or concerns about your condition or care  Self-care:       · Resume your normal diet  · Keep your dressings dry  Do not take a shower or swim  You may take a tub bath, but do not get your dressings wet  Water in your wound can cause bacteria to grow and cause an infection  If your dressing gets wet, dry it off and cover it with dry sterile gauze  Call your healthcare provider  Do not use soaps or ointments  · Do not change your dressings  Your healthcare provider or dialysis nurse will change your dressings  Your dressings should stay in place until your healthcare provider removes them  The dressing on your chest will stay as long as you have the catheter in place  The dressing prevents infection  · Do not remove the red and blue caps from the end of your catheter  The caps prevent air from getting into your catheter    Follow up with your healthcare provider as directed: Write down your questions so you remember to ask them during your visits

## 2021-11-02 NOTE — PHYSICAL THERAPY NOTE
Physical Therapy Progress Note     11/02/21 1420   PT Last Visit   PT Visit Date 11/02/21   Note Type   Note Type Treatment   Pain Assessment   Pain Assessment Tool 0-10   Pain Score No Pain   Restrictions/Precautions   Other Precautions Fall Risk;Limb alert   Subjective   Subjective The pt  notes that she is frustrated with her RUE being so edematous, but she does acknowledge that she is feeling somewhat better  She notes fatigue with hemodialysis  Bed Mobility   Supine to Sit 2  Maximal assistance   Additional items Assist x 1; Increased time required; Bedrails; Comment  (To long-sitting )   Sit to Supine 2  Maximal assistance   Additional items Assist x 1;Bedrails;Verbal cues; Comment  (From long-sitting )   Balance   Static Sitting Fair -   Activity Tolerance   Activity Tolerance Patient limited by fatigue   Exercises   Quad Sets Supine;Bilateral;AROM;10 reps   Ankle Pumps Supine;Bilateral;AROM;20 reps   UE Exercise Supine;Right;AROM;10 reps  (Hand squeezes x 2 sets, elbow flexion and extension x 1 set )   Assessment   Prognosis Fair   Problem List Decreased strength;Decreased endurance; Impaired balance;Decreased mobility; Decreased cognition;Decreased skin integrity;Obesity   Assessment The pt  was notably limited due to fatigue after hemodialysis  She declined sitting at the side of the bed or standing, but she was agreeable to therapeutic exercise  She expressed concern about her RUE, and instructed her in hand squeezes and elbow ROM as well as to maintain her RUE elevated to reduce the edema  She was very appreciative of this  She was able to come into long-sitting in order to reposition her and place a blanket for her comfort  She does appear more lively than she has over the past several days, but she continues to fatigue very easily  She had all needs within reach and BUE were elevated on pillows post session     Barriers to Discharge Inaccessible home environment;Decreased caregiver support   Goals   Patient Goals To rest    STG Expiration Date 11/15/21   PT Treatment Day 5   Plan   Treatment/Interventions Functional transfer training;LE strengthening/ROM; Therapeutic exercise; Endurance training;Patient/family training;Bed mobility;Gait training   Progress Slow progress, decreased activity tolerance   PT Frequency   (3-5x a week )   Recommendation   PT Discharge Recommendation Post acute rehabilitation services   AM-PAC Basic Mobility Inpatient   Turning in Bed Without Bedrails 2   Lying on Back to Sitting on Edge of Flat Bed 1   Moving Bed to Chair 1   Standing Up From Chair 1   Walk in Room 1   Climb 3-5 Stairs 1   Basic Mobility Inpatient Raw Score 7   Turning Head Towards Sound 4   Follow Simple Instructions 4   Low Function Basic Mobility Raw Score 15   Low Function Basic Mobility Standardized Score 23 9     An AM-PAC Basic Mobility standardized score less than 42 9 suggests the patient may benefit from discharge to post-acute rehab services      Es Johnson, PTA

## 2021-11-03 ENCOUNTER — PATIENT OUTREACH (OUTPATIENT)
Dept: CASE MANAGEMENT | Facility: HOSPITAL | Age: 76
End: 2021-11-03

## 2021-11-03 LAB
ERYTHROCYTE [DISTWIDTH] IN BLOOD BY AUTOMATED COUNT: 17.7 % (ref 11.6–15.1)
HCT VFR BLD AUTO: 27.7 % (ref 34.8–46.1)
HGB BLD-MCNC: 9 G/DL (ref 11.5–15.4)
MCH RBC QN AUTO: 30 PG (ref 26.8–34.3)
MCHC RBC AUTO-ENTMCNC: 32.5 G/DL (ref 31.4–37.4)
MCV RBC AUTO: 92 FL (ref 82–98)
PLATELET # BLD AUTO: 219 THOUSANDS/UL (ref 149–390)
PMV BLD AUTO: 10 FL (ref 8.9–12.7)
RBC # BLD AUTO: 3 MILLION/UL (ref 3.81–5.12)
WBC # BLD AUTO: 4.85 THOUSAND/UL (ref 4.31–10.16)

## 2021-11-03 PROCEDURE — 99232 SBSQ HOSP IP/OBS MODERATE 35: CPT | Performed by: INTERNAL MEDICINE

## 2021-11-03 PROCEDURE — 99233 SBSQ HOSP IP/OBS HIGH 50: CPT | Performed by: GENERAL PRACTICE

## 2021-11-03 PROCEDURE — 85027 COMPLETE CBC AUTOMATED: CPT | Performed by: GENERAL PRACTICE

## 2021-11-03 PROCEDURE — 97530 THERAPEUTIC ACTIVITIES: CPT

## 2021-11-03 PROCEDURE — 97112 NEUROMUSCULAR REEDUCATION: CPT

## 2021-11-03 PROCEDURE — 97535 SELF CARE MNGMENT TRAINING: CPT

## 2021-11-03 RX ORDER — LOPERAMIDE HYDROCHLORIDE 2 MG/1
2 CAPSULE ORAL 4 TIMES DAILY PRN
Status: DISCONTINUED | OUTPATIENT
Start: 2021-11-03 | End: 2021-11-13 | Stop reason: HOSPADM

## 2021-11-03 RX ADMIN — LEVOTHYROXINE SODIUM 75 MCG: 75 TABLET ORAL at 09:36

## 2021-11-03 RX ADMIN — PIPERACILLIN AND TAZOBACTAM 2.25 G: 2; .25 INJECTION, POWDER, FOR SOLUTION INTRAVENOUS at 22:22

## 2021-11-03 RX ADMIN — PANTOPRAZOLE SODIUM 40 MG: 40 TABLET, DELAYED RELEASE ORAL at 16:12

## 2021-11-03 RX ADMIN — PIPERACILLIN AND TAZOBACTAM 2.25 G: 2; .25 INJECTION, POWDER, FOR SOLUTION INTRAVENOUS at 16:30

## 2021-11-03 RX ADMIN — APIXABAN 2.5 MG: 2.5 TABLET, FILM COATED ORAL at 17:31

## 2021-11-03 RX ADMIN — APIXABAN 2.5 MG: 2.5 TABLET, FILM COATED ORAL at 11:45

## 2021-11-03 RX ADMIN — METOPROLOL TARTRATE 25 MG: 25 TABLET, FILM COATED ORAL at 17:31

## 2021-11-03 RX ADMIN — ATORVASTATIN CALCIUM 40 MG: 40 TABLET, FILM COATED ORAL at 22:22

## 2021-11-03 RX ADMIN — PIPERACILLIN AND TAZOBACTAM 2.25 G: 2; .25 INJECTION, POWDER, FOR SOLUTION INTRAVENOUS at 10:08

## 2021-11-03 RX ADMIN — CALCITRIOL 0.25 MCG: 0.25 CAPSULE, LIQUID FILLED ORAL at 09:36

## 2021-11-03 RX ADMIN — ACETAMINOPHEN 650 MG: 325 TABLET, FILM COATED ORAL at 16:13

## 2021-11-03 RX ADMIN — MELATONIN TAB 3 MG 3 MG: 3 TAB at 22:22

## 2021-11-03 RX ADMIN — HEPARIN SODIUM 5000 UNITS: 5000 INJECTION INTRAVENOUS; SUBCUTANEOUS at 05:40

## 2021-11-03 RX ADMIN — METOPROLOL TARTRATE 25 MG: 25 TABLET, FILM COATED ORAL at 09:35

## 2021-11-03 RX ADMIN — PANTOPRAZOLE SODIUM 40 MG: 40 TABLET, DELAYED RELEASE ORAL at 05:40

## 2021-11-03 RX ADMIN — PIPERACILLIN AND TAZOBACTAM 2.25 G: 2; .25 INJECTION, POWDER, FOR SOLUTION INTRAVENOUS at 05:40

## 2021-11-03 RX ADMIN — CITALOPRAM HYDROBROMIDE 20 MG: 20 TABLET ORAL at 09:35

## 2021-11-03 RX ADMIN — Medication 250 MG: at 09:35

## 2021-11-03 NOTE — SPEECH THERAPY NOTE
Speech Language/Pathology  Attempted to see pt for ongoing dysphagia dx tx  Diet was resumed to regular liberal renal  instead of level 2 dysphagia liberal renal   Please change to the modified diet  Pt refused am meal stating she could not eat, felt "gross"  Will f/u as able

## 2021-11-03 NOTE — NURSING NOTE
PT placed patient in chair  Pt  Requested to go back to bed, was told patient assist x2  Shay Verde RN and I attempted to get patient up to stand  Patient's knees gave out and patient fell to floor  Dr Arash Toussaint notified  No bruising or swelling present in b/l knees at this time  No pain reported by patient

## 2021-11-03 NOTE — PLAN OF CARE
Problem: MOBILITY - ADULT  Goal: Maintain or return to baseline ADL function  Description: INTERVENTIONS:  -  Assess patient's ability to carry out ADLs; assess patient's baseline for ADL function and identify physical deficits which impact ability to perform ADLs (bathing, care of mouth/teeth, toileting, grooming, dressing, etc )  - Assess/evaluate cause of self-care deficits   - Assess range of motion  - Assess patient's mobility; develop plan if impaired  - Assess patient's need for assistive devices and provide as appropriate  - Encourage maximum independence but intervene and supervise when necessary  - Involve family in performance of ADLs  - Assess for home care needs following discharge   - Consider OT consult to assist with ADL evaluation and planning for discharge  - Provide patient education as appropriate  Outcome: Progressing  Goal: Maintains/Returns to pre admission functional level  Description: INTERVENTIONS:  - Perform BMAT or MOVE assessment daily    - Set and communicate daily mobility goal to care team and patient/family/caregiver  - Collaborate with rehabilitation services on mobility goals if consulted  - Perform Range of Motion 3 times a day  - Reposition patient every 3 hours    - Dangle patient 3 times a day  - Stand patient 3 times a day  - Ambulate patient 3 times a day  - Out of bed to chair 3 times a day   - Out of bed for meals 3 times a day  - Out of bed for toileting  - Record patient progress and toleration of activity level   Outcome: Progressing     Problem: Potential for Falls  Goal: Patient will remain free of falls  Description: INTERVENTIONS:  - Educate patient/family on patient safety including physical limitations  - Instruct patient to call for assistance with activity   - Consult OT/PT to assist with strengthening/mobility   - Keep Call bell within reach  - Keep bed low and locked with side rails adjusted as appropriate  - Keep care items and personal belongings within reach  - Initiate and maintain comfort rounds  - Make Fall Risk Sign visible to staff  - Offer Toileting every 3 Hours, in advance of need  - Initiate/Maintain 3alarm  - Obtain necessary fall risk management equipment: 33  - Apply yellow socks and bracelet for high fall risk patients  - Consider moving patient to room near nurses station  Outcome: Progressing     Problem: Prexisting or High Potential for Compromised Skin Integrity  Goal: Skin integrity is maintained or improved  Description: INTERVENTIONS:  - Identify patients at risk for skin breakdown  - Assess and monitor skin integrity  - Assess and monitor nutrition and hydration status  - Monitor labs   - Assess for incontinence   - Turn and reposition patient  - Assist with mobility/ambulation  - Relieve pressure over bony prominences  - Avoid friction and shearing  - Provide appropriate hygiene as needed including keeping skin clean and dry  - Evaluate need for skin moisturizer/barrier cream  - Collaborate with interdisciplinary team   - Patient/family teaching  - Consider wound care consult   Outcome: Progressing     Problem: Nutrition/Hydration-ADULT  Goal: Nutrient/Hydration intake appropriate for improving, restoring or maintaining nutritional needs  Description: Monitor and assess patient's nutrition/hydration status for malnutrition  Collaborate with interdisciplinary team and initiate plan and interventions as ordered  Monitor patient's weight and dietary intake as ordered or per policy  Utilize nutrition screening tool and intervene as necessary  Determine patient's food preferences and provide high-protein, high-caloric foods as appropriate       INTERVENTIONS:  - Monitor oral intake, urinary output, labs, and treatment plans  - Assess nutrition and hydration status and recommend course of action  - Evaluate amount of meals eaten  - Assist patient with eating if necessary   - Allow adequate time for meals  - Recommend/ encourage appropriate diets, oral nutritional supplements, and vitamin/mineral supplements  - Order, calculate, and assess calorie counts as needed  - Recommend, monitor, and adjust tube feedings and TPN/PPN based on assessed needs  - Assess need for intravenous fluids  - Provide specific nutrition/hydration education as appropriate  - Include patient/family/caregiver in decisions related to nutrition  Outcome: Progressing     Problem: METABOLIC, FLUID AND ELECTROLYTES - ADULT  Goal: Electrolytes maintained within normal limits  Description: INTERVENTIONS:  - Monitor labs and assess patient for signs and symptoms of electrolyte imbalances  - Administer electrolyte replacement as ordered  - Monitor response to electrolyte replacements, including repeat lab results as appropriate  - Instruct patient on fluid and nutrition as appropriate  Outcome: Progressing  Goal: Fluid balance maintained  Description: INTERVENTIONS:  - Monitor labs   - Monitor I/O and WT  - Instruct patient on fluid and nutrition as appropriate  - Assess for signs & symptoms of volume excess or deficit  Outcome: Progressing

## 2021-11-03 NOTE — OCCUPATIONAL THERAPY NOTE
Occupational Therapy Progress Note     Patient Name: Marciano Saenz  [id number] Date: 11/3/2021  Problem List  Principal Problem:    Sepsis on admission  Active Problems:    History of pulmonary embolism    Obstructive uropathy    Essential hypertension    Polycythemia vera ([address] )    Anemia of chronic disease    Acute renal failure superimposed on CKD stage 5    Perinephric abscess    Pleural effusion    Swelling of right upper extremity    Bacteremia          11/03/21 1110   OT Last Visit   OT Visit Date 11/03/21   Note Type   Note Type Treatment   Restrictions/Precautions   Weight Bearing Precautions Per Order No   Other Precautions Cognitive; Chair Alarm; Bed Alarm;Multiple lines; Fall Risk;Pain  (RUE limb alert )   General   Response to Previous Treatment Patient reporting fatigue but able to participate   Lifestyle   Autonomy PTA, pt reports being I with ADLs/IADLs, fnxl mobility, (+)    Reciprocal Relationships Neighbor   Service to Others Retired   Intrinsic Gratification Watching TV   ADL   Where Assessed Edge of bed   Grooming Assistance 4  Minimal Assistance   Grooming Deficit Brushing hair   Grooming Comments + washing hair  S for washing face   LB Bathing Assistance 2  Maximal Assistance   LB Bathing Deficit Right lower leg including foot; Left lower leg including foot   LB Dressing Assistance 2  Maximal Assistance   LB Dressing Deficit Don/doff R shoe;Don/doff L shoe   Toileting Assistance  2  Maximal Assistance   Toileting Deficit Perineal hygiene   Bed Mobility   Supine to Sit 2  Maximal assistance   Additional items Assist x 2;HOB elevated; Increased time required;LE management   Sit to Supine Unable to assess   Additional Comments Pt maintains EOB sitting position with CGA    Transfers   Sit to Stand 3  Moderate assistance   Additional items Assist x 2; Increased time required   Stand to Sit 3  Moderate assistance   Additional items Assist x 2; Increased time required   Stand pivot 3  Moderate assistance   Additional items Assist x 2; Increased time required  (pt takes minimal steps )   Additional Comments Transfers with RW    Cognition   Arousal/Participation Responsive; Cooperative   Attention Attends with cues to redirect   Orientation Level Oriented to person;Oriented to place;Oriented to situation   Following Commands Follows one step commands without difficulty   Comments Pt cooperative during session, fatigued  Flat affect    Activity Tolerance   Activity Tolerance Patient limited by fatigue   Medical Staff Made Aware PT Tony Steen RN    Assessment   Assessment Patient participated in Skilled OT session this date with interventions consisting of ADL re training with the use of correct body mechnaics, safety awareness and fall prevention techniques,  therapeutic activities to: increase activity tolerance, increase dynamic sitting balance during functional activity  and increase OOB/ sitting tolerance   Upon arrival patient was found supine in bed  Pt demonstrated the following tasks: MAX A x 2 sup to sit, MOD A x 2 STS + bed to chair transfer with RW  Pt requires MAX A for LB ADLs, MIN A for hair care, S for washing face  Patient continues to be functioning below baseline level, occupational performance remains limited secondary to factors listed above and increased risk for falls and injury  From OT standpoint, recommendation at time of d/c would be STR  Patient to benefit from continued Occupational Therapy treatment while in the hospital to address deficits as defined above and maximize level of functional independence with ADLs and functional mobility  Pt was left in chair with alarm on after session with all current needs met  The patient's raw score on the AM-PAC Daily Activity inpatient short form is 14, standardized score is 33 39, less than 39 4  Patients at this level are likely to benefit from discharge to post-acute rehabilitation services   Please refer to the recommendation of the Occupational Therapist for safe discharge planning  Plan   Treatment Interventions ADL retraining;Functional transfer training;UE strengthening/ROM; Endurance training;Cognitive reorientation;Patient/family training;Equipment evaluation/education; Compensatory technique education;Continued evaluation; Energy conservation; Activityengagement   Goal Expiration Date 11/08/21   OT Treatment Day 4   OT Frequency 2-3x/wk   Recommendation   OT Discharge Recommendation Post acute rehabilitation services   OT - OK to Discharge Yes  (to rehab when medically stable )   AM-PAC Daily Activity Inpatient   Lower Body Dressing 2   Bathing 2   Toileting 2   Upper Body Dressing 2   Grooming 3   Eating 3   Daily Activity Raw Score 14   Daily Activity Standardized Score (Calc for Raw Score >=11) 33 39   AM-PAC Applied Cognition Inpatient   Following a Speech/Presentation 3   Understanding Ordinary Conversation 4   Taking Medications 4   Remembering Where Things Are Placed or Put Away 3   Remembering List of 4-5 Errands 3   Taking Care of Complicated Tasks 2   Applied Cognition Raw Score 19   Applied Cognition Standardized Score 39 77        Gaudencio Keenan MS, OTR/L

## 2021-11-03 NOTE — PROGRESS NOTES
NEPHROLOGY PROGRESS NOTE   Carole Callahan 77 y o  female MRN: [id number]  Unit/Bed#: [facility] 929-01 Encounter: [id number]  Reason for Consult: CKD V      SUMMARY:    69-year-old female with history of advanced chronic kidney disease and obstructive uropathy with a history of hydronephrosis and stent placement who had presented with stent removal prior, polycythemia vera, hypertension presented for abdominal pain and imaging revealed a hematoma the left kidney and had also met sepsis criteria      ASSESSMENT and PLAN:     Acute renal failure advanced underlying chronic kidney disease Stage V  --currently dialysis dependent and deemed end-stage renal disease due to advanced chronic kidney disease  --access:  Right arm AV fistula (s/p  vein transposition on 9/30), utilized 1 needle on Saturday along with left IJ PermCath  Her fistulograms as for referring stenosis  The right arm is swollen  --underwent last dialysis on Saturday October 30th  --currently dialyzing Tuesday Thursday Saturday  --sepsis secondary to left renal hematoma  --underwent dialysis yesterday  --her right arm is quite swollen, underwent fistulogram on October 27th- recurrent stenosis treated with balloon angioplasty may possibly require stent, discussed with IR currently now patent, recommend elevation and cold compresses at this time     Anemia chronic kidney disease  --not on Epogen due to history of pulmonary embolism  --transfuse for hemoglobin less than 7  --evidence of melena with recent possible upper GI bleed    EGD was unremarkable     Mineral bone disorder-chronic kidney disease  --vitamin-D deficiency  -- 4, replace vitamin-D stores  --currently on calcitriol 0 25 mcg     Blood pressure/volume status  --history of hypertension  --currently normotensive  --does not examine overtly volume overloaded  --currently on metoprolol 25 mg twice a day (high dialyzed)     Sepsis  --infected left renal hematoma with chronic obstructive uropathy, recent PCN removal, resulting in bleeding while being maintained on anticoagulation  Underwent IR aspiration and drain placement on October 13th , ongoing follow-up with IR  --CT scan shows persistent collection  --antibiotics as per Infectious Disease  --Fusobacterium bacteremia, unknown source     Polycythemia/MDS  --not on Epogen  --recommend Hematology management, for recommendations on JENELLE       SUBJECTIVE / INTERVAL HISTORY:    No complaints  Other than fatigue  OBJECTIVE:  Current Weight: Weight - Scale: 84 5 kg (186 lb 4 6 oz) (post dialysis weight on 10/30)  Vitals:    11/02/21 1522 11/02/21 2323 11/03/21 0858 11/03/21 0900   BP: 109/58 124/62 135/74    BP Location:       Pulse: 82 92  84   Resp: 18 20  18   Temp: 98 1 °F (36 7 °C) 98 4 °F (36 9 °C)  98 4 °F (36 9 °C)   TempSrc:       SpO2: 93% 94%     Weight:       Height:           Intake/Output Summary (Last 24 hours) at 11/3/2021 1118  Last data filed at 11/3/2021 1008  Gross per 24 hour   Intake 460 ml   Output 300 ml   Net 160 ml       Review of Systems:    12 point ROS has been reviewed  Physical Exam  Vitals and nursing note reviewed  Exam conducted with a chaperone present  Constitutional:       General: She is not in acute distress  Appearance: She is well-developed  She is obese  She is ill-appearing  She is not diaphoretic  HENT:      Head: Normocephalic and atraumatic  Eyes:      General: No scleral icterus  Pupils: Pupils are equal, round, and reactive to light  Cardiovascular:      Rate and Rhythm: Normal rate and regular rhythm  Heart sounds: Normal heart sounds  No murmur heard  No friction rub  No gallop  Pulmonary:      Effort: Pulmonary effort is normal  No respiratory distress  Breath sounds: Normal breath sounds  No wheezing or rales  Chest:      Chest wall: No tenderness  Abdominal:      General: Bowel sounds are normal  There is no distension  Palpations: Abdomen is soft  Tenderness: There is no abdominal tenderness  There is no rebound  Musculoskeletal:         General: Swelling present  Normal range of motion  Cervical back: Normal range of motion and neck supple  Right lower leg: Edema present  Left lower leg: Edema present  Skin:     General: Skin is dry  Coloration: Skin is pale  Findings: No rash  Neurological:      Mental Status: She is alert and oriented to person, place, and time           Medications:    Current Facility-Administered Medications:     acetaminophen (TYLENOL) tablet 650 mg, 650 mg, Oral, Q6H PRN, Rosa Maria Garcia MD, 650 mg at 10/21/21 1215    apixaban (ELIQUIS) tablet 2 5 mg, 2 5 mg, Oral, BID, Milady Jenkins,     atorvastatin (LIPITOR) tablet 40 mg, 40 mg, Oral, HS, Rosa Maria Garcia MD, 40 mg at 11/02/21 2303    calcitriol (ROCALTROL) capsule 0 25 mcg, 0 25 mcg, Oral, Daily, Rosa Maria Garcia MD, 0 25 mcg at 11/03/21 0936    cholecalciferol (VITAMIN D) oral liquid 800 Units, 800 Units, Oral, Daily, Rosa Maria Garcia MD, 800 Units at 11/03/21 0936    citalopram (CeleXA) tablet 20 mg, 20 mg, Oral, Daily, Rosa Maria Garcia MD, 20 mg at 11/03/21 0935    levothyroxine tablet 75 mcg, 75 mcg, Oral, Daily, Rosa Maria Garcia MD, 75 mcg at 11/03/21 0936    melatonin tablet 3 mg, 3 mg, Oral, HS, Rosa Maria Garcia MD, 3 mg at 11/02/21 2303    metoprolol (LOPRESSOR) injection 2 5 mg, 2 5 mg, Intravenous, Q6H PRN, Dior Gomes PA-C, 2 5 mg at 10/20/21 1618    metoprolol tartrate (LOPRESSOR) tablet 25 mg, 25 mg, Oral, BID, Dior Gomes PA-C, 25 mg at 11/03/21 0935    ondansetron (ZOFRAN) injection 4 mg, 4 mg, Intravenous, Q6H PRN, Rosa Maria Garcia MD, 4 mg at 10/14/21 2153    pantoprazole (PROTONIX) EC tablet 40 mg, 40 mg, Oral, BID AC, Mumtaz Laguerre DO, 40 mg at 11/03/21 0540    piperacillin-tazobactam (ZOSYN) 2 25 g in sodium chloride 0 9 % 50 mL IVPB, 2 25 g, Intravenous, Q6H, Villa Pereira MD, Last Rate: 100 mL/hr at 11/03/21 1008, 2 25 g at 11/03/21 1008    saccharomyces boulardii (FLORASTOR) capsule 250 mg, 250 mg, Oral, Daily, Tristin Zhang MD, 250 mg at 11/03/21 0935    Laboratory Results:  Results from last 7 days   Lab Units 11/03/21  0542 11/02/21  0520 11/01/21  0328 10/30/21  0508 10/29/21  0759 10/29/21  0609   WBC Thousand/uL 4 85 4 12* 4 00* 3 15* 2 90* 3 13*   HEMOGLOBIN g/dL 9 0* 8 4* 8 4* 8 0* 6 1* 5 9*   HEMATOCRIT % 27 7* 27 0* 26 0* 25 0* 19 5* 19 4*   PLATELETS Thousands/uL 219 207 172 184 159 164   POTASSIUM mmol/L  --  3 5  --   --   --   --    CHLORIDE mmol/L  --  103  --   --   --   --    CO2 mmol/L  --  21  --   --   --   --    BUN mg/dL  --  22  --   --   --   --    CREATININE mg/dL  --  4 65*  --   --   --   --    CALCIUM mg/dL  --  8 6  --   --   --   --

## 2021-11-03 NOTE — PLAN OF CARE
Problem: PHYSICAL THERAPY ADULT  Goal: Performs mobility at highest level of function for planned discharge setting  See evaluation for individualized goals  Description: Treatment/Interventions: Patient/family training, LE strengthening/ROM, Bed mobility, Spoke to nursing, Spoke to case management, OT  Equipment Recommended:  (TBD )       See flowsheet documentation for full assessment, interventions and recommendations  Outcome: Progressing  Note: Prognosis: Fair  Problem List: Decreased strength, Decreased endurance, Impaired balance, Decreased mobility, Decreased cognition, Decreased skin integrity, Obesity  Assessment: Pt seen for PT treatment session this date  Therapy session focused on bed mobility, sitting static/ dynamic balance, functional transfers, gait training, and endurance training in order to improve overall mobility and independence  Pt requires assist of 2 for all mobility performed this date  Pt continues to be limited by decreased endurance and increased fatigue with all mobility; increased time required to complete all tasks as well as frequent rest breaks required  Pt performed bed mobility tasks with max Ax2  Pt sitting EOB at close S level  Pt performed multiple STS from EOB/ bedside chair to b/l HHA initially progressing to RW with mod Ax2  Pt able to take 1 step to initiate SPT from EOB to bedside chair  Pt with increased standing tolerance compared to previous sessions  Pt making progress toward goals  Pt was left sitting upright in bedside chair with chair alarm on at the end of PT session with all needs in reach  Pt would benefit from continued PT services while in hospital to address remaining limitations  PT to continue to follow pt and recommends post-acute rehab services after d/c  The patient's AM-PAC Basic Mobility Inpatient Short Form Low Function Raw Score 15 , Standardized Score is 23 9   A standardized score less 42 9 suggests the patient may benefit from discharge to post-acute rehab services  Please also refer to the recommendation of the Physical Therapist for safe discharge planning  Barriers to Discharge: Inaccessible home environment, Decreased caregiver support        PT Discharge Recommendation: Post acute rehabilitation services     PT - OK to Discharge: Yes (to rehab once medically cleared )    See flowsheet documentation for full assessment

## 2021-11-03 NOTE — PLAN OF CARE
Problem: OCCUPATIONAL THERAPY ADULT  Goal: Performs self-care activities at highest level of function for planned discharge setting  See evaluation for individualized goals  Description: Treatment Interventions: ADL retraining, Functional transfer training, UE strengthening/ROM, Endurance training, Patient/family training, Equipment evaluation/education, Compensatory technique education, Continued evaluation, Activityengagement, Energy conservation          See flowsheet documentation for full assessment, interventions and recommendations  Outcome: Progressing  Note: Limitation: Decreased ADL status, Decreased UE ROM, Decreased UE strength, Decreased endurance, Decreased self-care trans, Decreased high-level ADLs  Prognosis: Fair  Assessment: Patient participated in Skilled OT session this date with interventions consisting of ADL re training with the use of correct body mechnaics, safety awareness and fall prevention techniques,  therapeutic activities to: increase activity tolerance, increase dynamic sitting balance during functional activity  and increase OOB/ sitting tolerance   Upon arrival patient was found supine in bed  Pt demonstrated the following tasks: MAX A x 2 sup to sit, MOD A x 2 STS + bed to chair transfer with RW  Pt requires MAX A for LB ADLs, MIN A for hair care, S for washing face  Patient continues to be functioning below baseline level, occupational performance remains limited secondary to factors listed above and increased risk for falls and injury  From OT standpoint, recommendation at time of d/c would be STR  Patient to benefit from continued Occupational Therapy treatment while in the hospital to address deficits as defined above and maximize level of functional independence with ADLs and functional mobility  Pt was left in chair with alarm on after session with all current needs met   The patient's raw score on the AM-PAC Daily Activity inpatient short form is 14, standardized score is 33 39, less than 39 4  Patients at this level are likely to benefit from discharge to post-acute rehabilitation services  Please refer to the recommendation of the Occupational Therapist for safe discharge planning       OT Discharge Recommendation: Post acute rehabilitation services  OT - OK to Discharge: Yes (to rehab when medically stable )

## 2021-11-03 NOTE — UTILIZATION REVIEW
Continued Stay Review    Date: 11/3                          Current Patient Class: Inpatient  Current Level of Care: Med Surg    HPI:76 y o  female initially admitted on 10/9    Assessment/Plan: Dialysis TTS  Swelling of RUE - Today w/ swelling IR consulted for repeat fistulogram  Perinephric abscess - S/p IR drainage 10/12/21 with JOSE ANTONIO drain placement x 2  S/p repeat IR drainage 11/1 - f/u Cx  Antibiotics for 7 days post-drainage  Before removing drain will need CT or tube check  Bacteremia - Continue Iv antibiotics       Vital Signs:   11/03/21 14:55:25  98 3 °F (36 8 °C)  --  17  129/72  91  --  --  --  --  --   11/03/21 0900  98 4 °F (36 9 °C)  84  18  --  --  --  --  --  --  --   11/03/21 08:58:06  --  --  --  135/74  94  --  --  --  --       Pertinent Labs/Diagnostic Results:       Results from last 7 days   Lab Units 11/03/21  0542 11/02/21  0520 11/01/21  0328 10/30/21  0508 10/29/21  0759 10/29/21  0609   WBC Thousand/uL 4 85 4 12* 4 00* 3 15* 2 90* 3 13*   HEMOGLOBIN g/dL 9 0* 8 4* 8 4* 8 0* 6 1* 5 9*   HEMATOCRIT % 27 7* 27 0* 26 0* 25 0* 19 5* 19 4*   PLATELETS Thousands/uL 219 207 172 184 159 164   NEUTROS ABS Thousands/µL  --   --   --   --   --  1 99         Results from last 7 days   Lab Units 11/02/21  0520   SODIUM mmol/L 136   POTASSIUM mmol/L 3 5   CHLORIDE mmol/L 103   CO2 mmol/L 21   ANION GAP mmol/L 12   BUN mg/dL 22   CREATININE mg/dL 4 65*   EGFR ml/min/1 73sq m 9   CALCIUM mg/dL 8 6             Results from last 7 days   Lab Units 11/02/21  0520   GLUCOSE RANDOM mg/dL 71       Results from last 7 days   Lab Units 11/01/21  0328 10/29/21  0759 10/28/21  0931   PROTIME seconds 17 0* 21 7* 17 5*   INR  1 45* 2 00* 1 50*   PTT seconds  --  50*  --      Results from last 7 days   Lab Units 11/01/21 2004   GRAM STAIN RESULT  2+ Polys  No bacteria seen   BODY FLUID CULTURE, STERILE  No growth       Medications:   Scheduled Medications:  apixaban, 2 5 mg, Oral, BID  atorvastatin, 40 mg, Oral, HS  calcitriol, 0 25 mcg, Oral, Daily  cholecalciferol, 800 Units, Oral, Daily  citalopram, 20 mg, Oral, Daily  levothyroxine, 75 mcg, Oral, Daily  melatonin, 3 mg, Oral, HS  metoprolol tartrate, 25 mg, Oral, BID  pantoprazole, 40 mg, Oral, BID AC  piperacillin-tazobactam, 2 25 g, Intravenous, Q6H  saccharomyces boulardii, 250 mg, Oral, Daily      Continuous IV Infusions: None     PRN Meds:  acetaminophen, 650 mg, Oral, Q6H PRN  metoprolol, 2 5 mg, Intravenous, Q6H PRN  ondansetron, 4 mg, Intravenous, Q6H PRN      Discharge Plan: TBD    Network Utilization Review Department  ATTENTION: Please call with any questions or concerns to [phone] and carefully listen to the prompts so that you are directed to the right person  All voicemails are confidential   Lora Sadler all requests for admission clinical reviews, approved or denied determinations and any other requests to dedicated fax number below belonging to the campus where the patient is receiving treatment   List of dedicated fax numbers for the Facilities:  [address] DENIALS (Administrative/Medical Necessity) [phone]   [address] (Maternity/NICU/Pediatrics) [phone]   [address]  [address] [place] [address] [place] [address] [address] [facility] [facility] [phone]

## 2021-11-03 NOTE — CASE MANAGEMENT
CM called son to discuss d/c plans  The phone went to voicemail, CM left message requesting return call  CM will follow

## 2021-11-03 NOTE — ASSESSMENT & PLAN NOTE
· Blood cultures from 10/9 growing Fusobacterium - IV Zosyn -> PO Flagyl completed  · Repeat blood cultures 10/12 are negative

## 2021-11-03 NOTE — ASSESSMENT & PLAN NOTE
· Underwent repeat RUE vascular doppler study noting "a narrowing in the subclavian vein at the clavicle created elevated  PSV and turbulent flow  The dialysis AVF appears to be matured with adequate diameter, flow volumes and less than 6mm in depth throughout the arm  Antegrade, high resistant blunted flow noted in the peripheral arteries  No evidence of outflow obstruction  No evidence of a pseudoaneurysm    No evidence of a large venous branch "  · 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  · S/p fistulagram  · Improved with HD  · Today w/ swelling IR consulted for repeat fistulogram

## 2021-11-03 NOTE — PROGRESS NOTES
[address] [facility]  Progress Note - Courtney Eubanks [birth year], 77 y o  female MRN: [id number]  Unit/Bed#: [facility] [place] 929-01 Encounter: [id number]  Primary Care Provider: Galen Hawkins MD   Date and time admitted to hospital: 10/9/2021  2:19 PM    * Sepsis on admission  Assessment & Plan  · Previously with fever coupled with tachycardia and elevated procalcitonin  · Likely secondary to infected left renal hematoma/perinephric abscess -> Fusobacterium bacteremia noted - fluid culture from 10/13 s/p IR-guided drainage growing Enterobacter/Enterococcus    Bacteremia  Assessment & Plan  · Blood cultures from 10/9 growing Fusobacterium - IV Zosyn -> PO Flagyl completed  · Repeat blood cultures 10/12 are negative    Perinephric abscess  Assessment & Plan  · Presented with left renal hematoma  Pigtail catheter is noted medial to kidney  Renal pelvis may be collapsed  Hemorrhage and thickening extending in the combined interfascial place of retrospective as well as some high density fluid within  · S/p IR drainage 10/12/21 with JOSE ANTONIO drain placement x 2    · S/p repeat IR drainage 11/1 - f/u Cx  · Abx through 7 days post-drainage  · Before removing drain will need CT or tube check          Swelling of right upper extremity  Assessment & Plan  · Underwent repeat RUE vascular doppler study noting "a narrowing in the subclavian vein at the clavicle created elevated  PSV and turbulent flow  The dialysis AVF appears to be matured with adequate diameter, flow volumes and less than 6mm in depth throughout the arm  Antegrade, high resistant blunted flow noted in the peripheral arteries  No evidence of outflow obstruction  No evidence of a pseudoaneurysm    No evidence of a large venous branch "  · 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  · S/p fistulagram  · Improved with HD  · Today w/ swelling IR consulted for repeat fistulogram    Pleural effusion  Assessment & Plan  Mild to moderate pleural effusion on CXR  S/p thoracentesis by IR 10/11/21   -patient currently comfortable on room air, chest x-ray from prior shows persistent left pleural effusion      Acute renal failure superimposed on CKD stage 5  Assessment & Plan  · Progressively worsening renal failure  · Dialysis TTS    Anemia of chronic disease  Assessment & Plan  · Monitor hemoglobin  · s/p 2 units of RBCs on 10/29  · Hb improved    Essential hypertension  Assessment & Plan  · Monitor blood pressures  · Avoid hypotension    Obstructive uropathy  Assessment & Plan  · Obstructive nephropathy  · Chronic bilateral hydronephrosis stents were recently removed  · Urology and Nephrology following; recommend continuing antibiotics and present treatment      History of pulmonary embolism  Assessment & Plan  · C/w low dose Eliquis for VTE prevention      VTE Pharmacologic Prophylaxis: VTE Score: 13 High Risk (Score >/= 5) - Pharmacological DVT Prophylaxis Ordered: apixaban (Eliquis)  Sequential Compression Devices Ordered  Patient Centered Rounds: I performed bedside rounds with nursing staff today  Discussions with Specialists or Other Care Team Provider: not yet    Education and Discussions with Family / Patient: Updated  (son) via phone  Time Spent for Care: 30 minutes  More than 50% of total time spent on counseling and coordination of care as described above  Current Length of Stay: 25 day(s)  Current Patient Status: Inpatient   Certification Statement: The patient will continue to require additional inpatient hospital stay due to need for IV abx and drain and fistulogram  Discharge Plan: Anticipate discharge in >72 hrs to rehab facility      Code Status: Level 1 - Full Code    Subjective:   No acute complaints    Objective:     Vitals:   Temp (24hrs), Av 3 °F (36 8 °C), Min:98 1 °F (36 7 °C), Max:98 4 °F (36 9 °C)    Temp:  [98 1 °F (36 7 °C)-98 4 °F (36 9 °C)] 98 4 °F (36 9 °C)  HR: [82-92] 84  Resp:  [18-20] 18  BP: (109-135)/(58-74) 135/74  SpO2:  [93 %-94 %] 94 %  Body mass index is 36 38 kg/m²  Input and Output Summary (last 24 hours): Intake/Output Summary (Last 24 hours) at 11/3/2021 1423  Last data filed at 11/3/2021 1300  Gross per 24 hour   Intake 280 ml   Output 300 ml   Net -20 ml       Physical Exam:   Physical Exam  HENT:      Head: Normocephalic and atraumatic  Nose: Nose normal       Mouth/Throat:      Mouth: Mucous membranes are moist    Eyes:      Extraocular Movements: Extraocular movements intact  Conjunctiva/sclera: Conjunctivae normal    Cardiovascular:      Rate and Rhythm: Normal rate and regular rhythm  Pulmonary:      Effort: Pulmonary effort is normal       Breath sounds: Normal breath sounds  No wheezing or rales  Abdominal:      General: Bowel sounds are normal  There is no distension  Palpations: Abdomen is soft  Tenderness: There is no abdominal tenderness  Musculoskeletal:         General: Swelling (RUE swelling) present  Normal range of motion  Cervical back: Normal range of motion and neck supple  Right lower leg: No edema  Left lower leg: No edema  Skin:     General: Skin is warm and dry  Neurological:      Mental Status: She is alert and oriented to person, place, and time           Additional Data:     Labs:  Results from last 7 days   Lab Units 11/03/21  0542 10/29/21  0609   WBC Thousand/uL 4 85 3 13*   HEMOGLOBIN g/dL 9 0* 5 9*   HEMATOCRIT % 27 7* 19 4*   PLATELETS Thousands/uL 219 164   NEUTROS PCT %  --  64   LYMPHS PCT %  --  12*   MONOS PCT %  --  22*   EOS PCT %  --  1     Results from last 7 days   Lab Units 11/02/21  0520   SODIUM mmol/L 136   POTASSIUM mmol/L 3 5   CHLORIDE mmol/L 103   CO2 mmol/L 21   BUN mg/dL 22   CREATININE mg/dL 4 65*   ANION GAP mmol/L 12   CALCIUM mg/dL 8 6   GLUCOSE RANDOM mg/dL 71     Results from last 7 days   Lab Units 11/01/21  0328   INR  1 45* Lines/Drains:  Invasive Devices     Peripherally Inserted Central Catheter Line            PICC Line 10/11/21 22 days          Line            Hemodialysis AV Fistula 09/30/21 Right Upper arm 34 days          Hemodialysis Catheter            HD Permanent Double Catheter 13 days          Drain            Urostomy Ileal conduit RUQ 1855 days    Closed/Suction Drain Left Other (Comment) Bulb 10 Fr  7 days    Closed/Suction Drain Left Back Bulb 10 Fr  1 day                Central Line:  Goal for removal: Will discontinue when peripheral access obtained  Imaging: No pertinent imaging reviewed  Recent Cultures (last 7 days):   Results from last 7 days   Lab Units 11/01/21 2004   GRAM STAIN RESULT  2+ Polys  No bacteria seen   BODY FLUID CULTURE, STERILE  No growth       Last 24 Hours Medication List:   Current Facility-Administered Medications   Medication Dose Route Frequency Provider Last Rate    acetaminophen  650 mg Oral Q6H PRN Michael Ruiz MD      apixaban  2 5 mg Oral BID Harriett Drake DO      atorvastatin  40 mg Oral HS Michael Ruiz MD      calcitriol  0 25 mcg Oral Daily Michael Ruiz MD      cholecalciferol  800 Units Oral Daily Michael Ruiz MD      citalopram  20 mg Oral Daily Michael Ruiz MD      levothyroxine  75 mcg Oral Daily Michael Ruiz MD      melatonin  3 mg Oral HS Michael Ruiz MD      metoprolol  2 5 mg Intravenous Q6H PRN Jaime Shafer PA-C      metoprolol tartrate  25 mg Oral BID Jaime Shafer PA-C      ondansetron  4 mg Intravenous Q6H PRN Michael Ruiz MD      pantoprazole  40 mg Oral BID AC Mumtaz Laguerre DO      piperacillin-tazobactam  2 25 g Intravenous Q6H Ju Moore MD 2 25 g (11/03/21 1008)    saccharomyces boulardii  250 mg Oral Daily Michael Ruiz MD          Today, Patient Was Seen By: Harriett Drake DO    **Please Note: This note may have been constructed using a voice recognition system  **

## 2021-11-03 NOTE — PHYSICAL THERAPY NOTE
PHYSICAL THERAPY NOTE          Patient Name: Andrea WELCHUV'ARACELI Date: 11/3/2021     11/03/21 1109   PT Last Visit   PT Visit Date 11/03/21   Note Type   Note Type Treatment   Pain Assessment   Pain Assessment Tool 0-10   Pain Score No Pain   Restrictions/Precautions   Weight Bearing Precautions Per Order No   Other Precautions Chair Alarm; Bed Alarm;Multiple lines; Fall Risk;Limb alert   General   Chart Reviewed Yes   Response to Previous Treatment Patient with no complaints from previous session  Family/Caregiver Present No   Cognition   Overall Cognitive Status Impaired   Arousal/Participation Cooperative   Attention Attends with cues to redirect   Orientation Level Oriented to person;Oriented to place;Oriented to time   Memory Decreased recall of precautions   Following Commands Follows one step commands with increased time or repetition   Comments Pt with flat affect, cooperative to participate in therapy session  Bed Mobility   Supine to Sit 2  Maximal assistance   Additional items Assist x 2;HOB elevated; Bedrails; Increased time required;Verbal cues;LE management   Sit to Supine Unable to assess   Additional Comments Pt supine in bed upon arrival  Pt sitting upright in bedside chair with chair alarm on with all needs within reach at the end of therapy session    Transfers   Sit to Stand 3  Moderate assistance   Additional items Assist x 2; Increased time required;Verbal cues   Stand to Sit 3  Moderate assistance   Additional items Assist x 2; Increased time required;Verbal cues   Stand pivot 3  Moderate assistance   Additional items Assist x 2; Increased time required;Verbal cues   Additional Comments transfers with b/l HHA progresses to RW; 3x STS performed throughout therapy session   Ambulation/Elevation   Gait pattern Excessively slow; Step to;Short stride; Foward flexed;Decreased foot clearance; Improper Weight shift Gait Assistance 3  Moderate assist   Additional items Assist x 2;Verbal cues; Tactile cues   Assistive Device Other (Comment)  (b/l HHA )   Distance 1 foot    Balance   Static Sitting Fair   Dynamic Sitting Fair -   Static Standing Poor +   Dynamic Standing Poor   Ambulatory Poor -   Endurance Deficit   Endurance Deficit Yes   Endurance Deficit Description gross weakness, generalized deconditioning, fatigue    Activity Tolerance   Activity Tolerance Patient limited by fatigue   Medical Staff Made Aware co-treatment with OT 2* increased medical complexity and multiple co-morbidities    Nurse Made Aware RN cleared pt to participate in therapy session    Exercises   Neuro re-ed Sitting EOB ~15 min at close S level using at least 1 UE support  Balance training  pt performed 3x STS to assist in hygiene tasks, able to tolerate stadning ~60 sec each trial    Assessment   Prognosis Fair   Problem List Decreased strength;Decreased endurance; Impaired balance;Decreased mobility; Decreased cognition;Decreased skin integrity;Obesity   Assessment Pt seen for PT treatment session this date  Therapy session focused on bed mobility, sitting static/ dynamic balance, functional transfers, gait training, and endurance training in order to improve overall mobility and independence  Pt requires assist of 2 for all mobility performed this date  Pt continues to be limited by decreased endurance and increased fatigue with all mobility; increased time required to complete all tasks as well as frequent rest breaks required  Pt performed bed mobility tasks with max Ax2  Pt sitting EOB at close S level  Pt performed multiple STS from EOB/ bedside chair to b/l HHA initially progressing to RW with mod Ax2  Pt able to take 1 step to initiate SPT from EOB to bedside chair  Pt with increased standing tolerance compared to previous sessions  Pt making progress toward goals   Pt was left sitting upright in bedside chair with chair alarm on at the end of PT session with all needs in reach  Pt would benefit from continued PT services while in hospital to address remaining limitations  PT to continue to follow pt and recommends post-acute rehab services after d/c  The patient's AM-PAC Basic Mobility Inpatient Short Form Low Function Raw Score 15 , Standardized Score is 23 9  A standardized score less 42 9 suggests the patient may benefit from discharge to post-acute rehab services  Please also refer to the recommendation of the Physical Therapist for safe discharge planning  Barriers to Discharge Inaccessible home environment;Decreased caregiver support   Goals   Patient Goals to feel better    STG Expiration Date 11/15/21   PT Treatment Day 6   Plan   Treatment/Interventions Functional transfer training;LE strengthening/ROM; Endurance training;Patient/family training;Equipment eval/education; Bed mobility;Gait training;Spoke to nursing;Spoke to case management;OT   Progress Progressing toward goals   PT Frequency Other (Comment)  (3-5x/wk )   Recommendation   PT Discharge Recommendation Post acute rehabilitation services   PT - OK to Discharge Yes  (to rehab once medically cleared )   AM-PAC Basic Mobility Inpatient   Turning in Bed Without Bedrails 2   Lying on Back to Sitting on Edge of Flat Bed 1   Moving Bed to Chair 1   Standing Up From Chair 1   Walk in Room 1   Climb 3-5 Stairs 1   Basic Mobility Inpatient Raw Score 7   Turning Head Towards Sound 4   Follow Simple Instructions 4   Low Function Basic Mobility Raw Score 15   Low Function Basic Mobility Standardized Score 23 9     Caitlin Tamayo, PT, DPT

## 2021-11-03 NOTE — PROGRESS NOTES
Progress Note - Infectious Disease   Toni Amen 77 y o  female MRN: [id number]  Unit/Bed#: [facility] 929-01 Encounter: [id number]      Impression/Plan:  1  Sepsis, POA   Patient presented with progressing flank/abdominal discomfort likely related to problem 3  Clinical parameters had improved  Overall poor progress likely due to 4  Tachycardia/borderline blood pressures 10/28, antibiotics restarted given 3  Currently stable  Continue antibiotics as below  Continue to trend fever curve/WBC  Ongoing follow-up by Nephrology  Follow-up IR cultures  Additional supportive care as per primary     2  Fusobacterium bacteremia   Source unknown, possibly transient given 1 or occult GI source  CT imaging of the abdomen on admission unremarkable  Repeat imaging with new collections noted in the abdomen and partial SBO, potential source   Drains placed   Patient without any teeth   Repeat blood cultures without growth   Not isolated on fluid cultures  No prior C-scope on chart review   EGD unremarkable  Imaging of the right upper quadrant ultrasound unremarkable   Completed empiric course of Flagyl  No further antibiotics for this issue  Continue to trend fever curve/WBC  Ongoing follow-up by GI  C scope once more stable/outpatient  Supportive care as per primary  Continue antibiotics otherwise as below     3  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   Patient has a chronic obstructive uropathy involving the left side and recently had PCN removal   Subsequently developed bleeding at the site and hematoma on imaging likely due to chronic anticoagulation   IR aspiration and drain placement on 10/13 and earlier urine cultures with same organisms   Drains likely provided significant source control   IR drain check noted one tube dislodged   Repeat CT scan with persistent collections, uncertain if these are sterile    Status post repeat aspiration with cultures   With changes in vitals on 10/28, antibiotics restarted    Continue renally dosed Zosyn based on prior cultures  Ongoing follow-up by IR  Follow up body fluid culture  Continue to trend fever curve/vitals  Repeat CBC with diff and BMP  Supportive care as per primary  Tentative plan for 7 days of antibiotic post drain placement  Recommend repeat CT images or IR drain check prior to stopping antibiotics      4  Navi Morgan on CKD with fistula  Patient with baseline chronic kidney disease with fistula created in the right upper extremity   Stenosis causing swelling   Kidney function worsened over entire admission   Initiated on dialysis  Monitor chemistry intermittently  Ongoing follow-up by Nephrology  Vascular surgery follow-up/imaging     5  History of C diff and diarrhea   Patient had a history of C diff in 2019  She has had repeat PCRs in July 2021 and now negative despite recent diarrhea  Elta Cordon related to 9  Patient reports some loose stools today  Antibiotics as above  Monitor stool output  No C diff prophylaxis for now     6  Polycythemia vera and MDS   Recent drop in hemoglobin and leukopenia likely related  Ongoing management/supportive care as per primary   Recommend follow-up with Oncology as outpatient      7  Prior PE on anticoagulation   Likely risk factor for issues as above  AC as per primary        8  Transaminitis   Patient noted to have slowly increasing alkaline phosphatase and AST   In the setting of this bacteremia consider occult abscess  Now downtrending/stable   Liver/RUQ ultrasound unremarkable  Monitor LFTs  Antibiotics as above  GI evaluation noted     9  Melena   Possible upper GI bleed   Ongoing follow-up and workup as per GI   EGD reportedly unremarkable   Additional care as per primary      Discussed the above management plan with the primary service    Antibiotics:  Zosyn 7    Subjective:  Patient has no fever, chills, sweats; no nausea, vomiting, diarrhea; no cough, shortness of breath; no pain   No new symptoms  Objective:  Vitals:  Temp:  [97 6 °F (36 4 °C)-98 4 °F (36 9 °C)] 98 4 °F (36 9 °C)  HR:  [77-92] 84  Resp:  [18-20] 18  BP: (109-135)/(58-74) 135/74  SpO2:  [93 %-94 %] 94 %  Temp (24hrs), Av 1 °F (36 7 °C), Min:97 6 °F (36 4 °C), Max:98 4 °F (36 9 °C)  Current: Temperature: 98 4 °F (36 9 °C)    Physical Exam:   General Appearance:  Alert, interactive, nontoxic, no acute distress  Throat: Oropharynx moist without lesions  Lungs:   Clear to auscultation bilaterally; no wheezes, rhonchi or rales; respirations unlabored   Heart:  RRR; no murmur, rub or gallop   Abdomen:   Soft, non-tender, non-distended, positive bowel sounds  Extremities: No clubbing, cyanosis or edema   Skin: No new rashes or lesions  No draining wounds noted         Labs, Imaging, & Other studies:   All pertinent labs and imaging studies were personally reviewed  Results from last 7 days   Lab Units 21  0542 21  0520 21  0328   WBC Thousand/uL 4 85 4 12* 4 00*   HEMOGLOBIN g/dL 9 0* 8 4* 8 4*   PLATELETS Thousands/uL 219 207 172     Results from last 7 days   Lab Units 21  0520   SODIUM mmol/L 136   POTASSIUM mmol/L 3 5   CHLORIDE mmol/L 103   CO2 mmol/L 21   BUN mg/dL 22   CREATININE mg/dL 4 65*   EGFR ml/min/1 73sq m 9   CALCIUM mg/dL 8 6     Results from last 7 days   Lab Units 21   GRAM STAIN RESULT  2+ Polys  No bacteria seen   BODY FLUID CULTURE, STERILE  No growth

## 2021-11-04 ENCOUNTER — APPOINTMENT (INPATIENT)
Dept: DIALYSIS | Facility: HOSPITAL | Age: 76
DRG: 981 | End: 2021-11-04
Payer: COMMERCIAL

## 2021-11-04 LAB
ALBUMIN SERPL BCP-MCNC: 1.3 G/DL (ref 3.5–5)
ANION GAP SERPL CALCULATED.3IONS-SCNC: 9 MMOL/L (ref 4–13)
BACTERIA SPEC ANAEROBE CULT: NO GROWTH
BACTERIA SPEC BFLD CULT: NO GROWTH
BUN SERPL-MCNC: 17 MG/DL (ref 5–25)
CALCIUM ALBUM COR SERPL-MCNC: 10.4 MG/DL (ref 8.3–10.1)
CALCIUM SERPL-MCNC: 8.2 MG/DL (ref 8.3–10.1)
CHLORIDE SERPL-SCNC: 101 MMOL/L (ref 100–108)
CO2 SERPL-SCNC: 28 MMOL/L (ref 21–32)
CREAT SERPL-MCNC: 4.01 MG/DL (ref 0.6–1.3)
ERYTHROCYTE [DISTWIDTH] IN BLOOD BY AUTOMATED COUNT: 17.6 % (ref 11.6–15.1)
GFR SERPL CREATININE-BSD FRML MDRD: 10 ML/MIN/1.73SQ M
GLUCOSE SERPL-MCNC: 92 MG/DL (ref 65–140)
GRAM STN SPEC: NORMAL
GRAM STN SPEC: NORMAL
HCT VFR BLD AUTO: 25.6 % (ref 34.8–46.1)
HGB BLD-MCNC: 8.2 G/DL (ref 11.5–15.4)
MCH RBC QN AUTO: 29.5 PG (ref 26.8–34.3)
MCHC RBC AUTO-ENTMCNC: 32 G/DL (ref 31.4–37.4)
MCV RBC AUTO: 92 FL (ref 82–98)
PHOSPHATE SERPL-MCNC: 3.4 MG/DL (ref 2.3–4.1)
PLATELET # BLD AUTO: 213 THOUSANDS/UL (ref 149–390)
PMV BLD AUTO: 9.8 FL (ref 8.9–12.7)
POTASSIUM SERPL-SCNC: 2.9 MMOL/L (ref 3.5–5.3)
RBC # BLD AUTO: 2.78 MILLION/UL (ref 3.81–5.12)
SODIUM SERPL-SCNC: 138 MMOL/L (ref 136–145)
WBC # BLD AUTO: 4.71 THOUSAND/UL (ref 4.31–10.16)

## 2021-11-04 PROCEDURE — 99232 SBSQ HOSP IP/OBS MODERATE 35: CPT | Performed by: GENERAL PRACTICE

## 2021-11-04 PROCEDURE — 99232 SBSQ HOSP IP/OBS MODERATE 35: CPT | Performed by: INTERNAL MEDICINE

## 2021-11-04 PROCEDURE — 85027 COMPLETE CBC AUTOMATED: CPT | Performed by: GENERAL PRACTICE

## 2021-11-04 PROCEDURE — 80069 RENAL FUNCTION PANEL: CPT | Performed by: GENERAL PRACTICE

## 2021-11-04 RX ADMIN — PIPERACILLIN AND TAZOBACTAM 2.25 G: 2; .25 INJECTION, POWDER, FOR SOLUTION INTRAVENOUS at 18:48

## 2021-11-04 RX ADMIN — APIXABAN 2.5 MG: 2.5 TABLET, FILM COATED ORAL at 18:38

## 2021-11-04 RX ADMIN — METOPROLOL TARTRATE 25 MG: 25 TABLET, FILM COATED ORAL at 18:38

## 2021-11-04 RX ADMIN — LEVOTHYROXINE SODIUM 75 MCG: 75 TABLET ORAL at 11:40

## 2021-11-04 RX ADMIN — CALCITRIOL 0.25 MCG: 0.25 CAPSULE, LIQUID FILLED ORAL at 11:41

## 2021-11-04 RX ADMIN — APIXABAN 2.5 MG: 2.5 TABLET, FILM COATED ORAL at 11:40

## 2021-11-04 RX ADMIN — PANTOPRAZOLE SODIUM 40 MG: 40 TABLET, DELAYED RELEASE ORAL at 18:38

## 2021-11-04 RX ADMIN — PIPERACILLIN AND TAZOBACTAM 2.25 G: 2; .25 INJECTION, POWDER, FOR SOLUTION INTRAVENOUS at 11:48

## 2021-11-04 RX ADMIN — PIPERACILLIN AND TAZOBACTAM 2.25 G: 2; .25 INJECTION, POWDER, FOR SOLUTION INTRAVENOUS at 05:25

## 2021-11-04 RX ADMIN — Medication 250 MG: at 11:40

## 2021-11-04 RX ADMIN — PANTOPRAZOLE SODIUM 40 MG: 40 TABLET, DELAYED RELEASE ORAL at 05:26

## 2021-11-04 RX ADMIN — CITALOPRAM HYDROBROMIDE 20 MG: 20 TABLET ORAL at 11:40

## 2021-11-04 RX ADMIN — ATORVASTATIN CALCIUM 40 MG: 40 TABLET, FILM COATED ORAL at 21:46

## 2021-11-04 RX ADMIN — MELATONIN TAB 3 MG 3 MG: 3 TAB at 21:46

## 2021-11-04 NOTE — PROGRESS NOTES
Progress Note - Infectious Disease   Pam Guzman 77 y o  female MRN: [id number]  Unit/Bed#: [facility] 929-01 Encounter: [id number]      Impression/Plan:  1  Sepsis, POA   Patient presented with progressing flank/abdominal discomfort likely related to problem 3  Clinical parameters had improved  Overall poor progress likely due to 4  Tachycardia/borderline blood pressures 10/28, antibiotics restarted given 3  Currently stable  Continue antibiotics as below  Recheck CBC with diff and CMP  Supportive care     2  Fusobacterium bacteremia   Source unknown, possibly transient given 1 or occult GI source  CT imaging of the abdomen on admission unremarkable  Repeat imaging with new collections noted in the abdomen and partial SBO, potential source   Drains placed   Patient without any teeth  Repeat blood cultures without growth   Not isolated on fluid cultures  No prior C-scope on chart review   EGD unremarkable  Imaging of the right upper quadrant ultrasound unremarkable   Completed empiric course of Flagyl  No further antibiotics for this issue  Continue to trend fever curve/WBC  GI follow-up  C scope once more stable/outpatient     3  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   Patient has a chronic obstructive uropathy involving the left side and recently had PCN removal   Subsequently developed bleeding at the site and hematoma on imaging likely due to chronic anticoagulation   IR aspiration and drain placement on 10/13 and earlier urine cultures with same organisms   Drains likely provided significant source control   IR drain check noted one tube dislodged   Repeat CT scan with persistent collections, uncertain if these are sterile    Status post repeat aspiration with cultures   With changes in vitals on 10/28, antibiotics restarted   Repeat body fluid cultures negative  Continue renally dosed Zosyn based on prior cultures  Drain management  Repeat CBC with diff and CMP  Supportive care as per primary  Tentative plan for 7 days of antibiotic post drain placement  Recommend repeat CT images or IR drain check prior to stopping antibiotics      4  Vergil Else on CKD with fistula  Patient with baseline chronic kidney disease with fistula created in the right upper extremity   Stenosis causing swelling   Kidney function worsened over entire admission   Initiated on dialysis  Nephrology follow-up  Hemodialysis  Vascular surgery follow-up/imaging     5  History of C diff and diarrhea   Patient had a history of C diff in   She has had repeat PCRs in 2021 and now negative despite recent diarrhea  Trudymaria de jesus Mccormick related to 9  Patient reports some loose stools today  Antibiotics as above  Monitor stool output  No C diff prophylaxis for now     6  Polycythemia vera and MDS   Recent drop in hemoglobin and leukopenia likely related  Ongoing management/supportive care as per primary   Recommend follow-up with Oncology as outpatient      7  Prior PE on anticoagulation   Likely risk factor for issues as above  AC as per primary        8  Transaminitis   Patient noted to have slowly increasing alkaline phosphatase and AST   In the setting of this bacteremia consider occult abscess  Now downtrending/stable   Liver/RUQ ultrasound unremarkable  Recheck LFTs  Antibiotics as above  GI follow-up     9  Melena   Possible upper GI bleed   Ongoing follow-up and workup as per GI   EGD reportedly unremarkable   Additional care as per primary       Discussed the above management plan with the primary service    Antibiotics:  Zosyn 8  Post drain 3    Subjective:  Patient has no fever, chills, sweats; no nausea, vomiting, diarrhea; no cough, shortness of breath; no pain  No new symptoms      Objective:  Vitals:  Temp:  [96 8 °F (36 °C)-98 3 °F (36 8 °C)] 97 7 °F (36 5 °C)  HR:  [77-83] 83  Resp:  [16-18] 18  BP: (105-142)/(50-81) 106/67  SpO2:  [95 %-96 %] 95 %  Temp (24hrs), Av 6 °F (36 4 °C), Min:96 8 °F (36 °C), Max:98 3 °F (36 8 °C)  Current: Temperature: 97 7 °F (36 5 °C)    Physical Exam:   General Appearance:  Alert, interactive, nontoxic, no acute distress  Throat: Oropharynx moist without lesions  Lungs:   Clear to auscultation bilaterally; no wheezes, rhonchi or rales; respirations unlabored   Heart:  RRR; no murmur, rub or gallop   Abdomen:   Soft, non-tender, non-distended, positive bowel sounds  Extremities: No clubbing, cyanosis or edema   Skin: No new rashes or lesions  No draining wounds noted         Labs, Imaging, & Other studies:   All pertinent labs and imaging studies were personally reviewed  Results from last 7 days   Lab Units 11/04/21  0531 11/03/21  0542 11/02/21  0520   WBC Thousand/uL 4 71 4 85 4 12*   HEMOGLOBIN g/dL 8 2* 9 0* 8 4*   PLATELETS Thousands/uL 213 219 207     Results from last 7 days   Lab Units 11/04/21  0531 11/02/21  0520   SODIUM mmol/L 138 136   POTASSIUM mmol/L 2 9* 3 5   CHLORIDE mmol/L 101 103   CO2 mmol/L 28 21   BUN mg/dL 17 22   CREATININE mg/dL 4 01* 4 65*   EGFR ml/min/1 73sq m 10 9   CALCIUM mg/dL 8 2* 8 6     Results from last 7 days   Lab Units 11/01/21 2004   GRAM STAIN RESULT  2+ Polys  No bacteria seen   BODY FLUID CULTURE, STERILE  No growth

## 2021-11-04 NOTE — ASSESSMENT & PLAN NOTE
· Underwent repeat RUE vascular doppler study noting "a narrowing in the subclavian vein at the clavicle created elevated  PSV and turbulent flow  The dialysis AVF appears to be matured with adequate diameter, flow volumes and less than 6mm in depth throughout the arm  Antegrade, high resistant blunted flow noted in the peripheral arteries  No evidence of outflow obstruction  No evidence of a pseudoaneurysm    No evidence of a large venous branch "  · 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  · S/p fistulagram  · Improved with HD  · IR recs compression - no need for repeat fistulogram

## 2021-11-04 NOTE — PLAN OF CARE
Post-Dialysis RN Treatment Note    Blood Pressure:  Pre 142/81 mm/Hg  Post 160/113mmHg   EDW  TBD kg    Weight:  Pre 83 0 kg   Post 82 0 kg   Mode of weight measurement: Bed Scale   Volume Removed  1000 ml    Treatment duration 180 minutes    NS given  No    Treatment shortened?  Yes, describe: due to scheduling   Medications given during Rx Not Applicable   Estimated Kt/V  1 35   Access type: Permacath/TDC   Access Status: Yes, describe: maintains BFR    Report called to primary nurse   Yes /   Remigio Richardson RN    1000 ml as tolerated, 4K bath, 3 hrs  Problem: METABOLIC, FLUID AND ELECTROLYTES - ADULT  Goal: Electrolytes maintained within normal limits  Description: INTERVENTIONS:  - Monitor labs and assess patient for signs and symptoms of electrolyte imbalances  - Administer electrolyte replacement as ordered  - Monitor response to electrolyte replacements, including repeat lab results as appropriate  - Instruct patient on fluid and nutrition as appropriate  Outcome: Progressing  Goal: Fluid balance maintained  Description: INTERVENTIONS:  - Monitor labs   - Monitor I/O and WT  - Instruct patient on fluid and nutrition as appropriate  - Assess for signs & symptoms of volume excess or deficit  Outcome: Progressing

## 2021-11-04 NOTE — PROGRESS NOTES
[address] [facility]  Progress Note - Denver Ledy [birth year], 77 y o  female MRN: [id number]  Unit/Bed#: [facility] 929-01 Encounter: [id number]  Primary Care Provider: Jake Arroyo MD   Date and time admitted to hospital: 10/9/2021  2:19 PM    * Sepsis on admission  Assessment & Plan  · Previously with fever coupled with tachycardia and elevated procalcitonin  · Likely secondary to infected left renal hematoma/perinephric abscess -> Fusobacterium bacteremia noted - fluid culture from 10/13 s/p IR-guided drainage growing Enterobacter/Enterococcus    Bacteremia  Assessment & Plan  · Blood cultures from 10/9 growing Fusobacterium - IV Zosyn -> PO Flagyl completed  · Repeat blood cultures 10/12 are negative    Perinephric abscess  Assessment & Plan  · Presented with left renal hematoma  Pigtail catheter is noted medial to kidney  Renal pelvis may be collapsed  Hemorrhage and thickening extending in the combined interfascial place of retrospective as well as some high density fluid within  · S/p IR drainage 10/12/21 with JOSE ANTONIO drain placement x 2    · S/p repeat IR drainage 11/1 - Cx neg  · Abx through 7 days post-drainage  · Before removing drain will need CT or tube check          Swelling of right upper extremity  Assessment & Plan  · Underwent repeat RUE vascular doppler study noting "a narrowing in the subclavian vein at the clavicle created elevated  PSV and turbulent flow  The dialysis AVF appears to be matured with adequate diameter, flow volumes and less than 6mm in depth throughout the arm  Antegrade, high resistant blunted flow noted in the peripheral arteries  No evidence of outflow obstruction  No evidence of a pseudoaneurysm    No evidence of a large venous branch "  · 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  · S/p fistulagram  · Improved with HD  · IR recs compression - no need for repeat fistulogram    Pleural effusion  Assessment & Plan  Mild to moderate pleural effusion on CXR  S/p thoracentesis by IR 10/11/21   -patient currently comfortable on room air, chest x-ray from prior shows persistent left pleural effusion      Acute renal failure superimposed on CKD stage 5  Assessment & Plan  · Progressively worsening renal failure  · Dialysis TTS    Anemia of chronic disease  Assessment & Plan  · Monitor hemoglobin  · s/p 2 units of RBCs on 10/29  · Hb improved    Polycythemia vera ([address] )  Assessment & Plan  · Hx of Polycythemia vera and MDS  · Significant anemia secondary to blood loss in the past    · The patient is currently off of the [facility] treatment for her PV and getting mainly Aranesp on every 28 day basis  · Hematology consulted for evaluation, recommend once patient is better/ stable, discharge, she will follow-up with Dr Felecia Morrow, her primary hematologist     Essential hypertension  Assessment & Plan  · Monitor blood pressures  · Avoid hypotension    Obstructive uropathy  Assessment & Plan  · Obstructive nephropathy  · Chronic bilateral hydronephrosis stents were recently removed  · Urology and Nephrology following; recommend continuing antibiotics and present treatment      History of pulmonary embolism  Assessment & Plan  · C/w low dose Eliquis for VTE prevention      VTE Pharmacologic Prophylaxis: VTE Score: 13 High Risk (Score >/= 5) - Pharmacological DVT Prophylaxis Ordered: apixaban (Eliquis)  Sequential Compression Devices Ordered  Patient Centered Rounds: I performed bedside rounds with nursing staff today  Discussions with Specialists or Other Care Team Provider: no    Education and Discussions with Family / Patient: Updated  (son) via phone  Time Spent for Care: 30 minutes  More than 50% of total time spent on counseling and coordination of care as described above      Current Length of Stay: 26 day(s)  Current Patient Status: Inpatient   Certification Statement: The patient will continue to require additional inpatient hospital stay due to need for IV abx and drain  Discharge Plan: Anticipate discharge in >72 hrs to rehab facility  Code Status: Level 1 - Full Code    Subjective:   Tired after HD    Objective:     Vitals:   Temp (24hrs), Av 6 °F (36 4 °C), Min:96 8 °F (36 °C), Max:98 3 °F (36 8 °C)    Temp:  [96 8 °F (36 °C)-98 3 °F (36 8 °C)] 97 7 °F (36 5 °C)  HR:  [77-83] 83  Resp:  [16-18] 18  BP: (105-142)/(50-81) 106/67  SpO2:  [95 %-96 %] 95 %  Body mass index is 36 38 kg/m²  Input and Output Summary (last 24 hours): Intake/Output Summary (Last 24 hours) at 2021 1413  Last data filed at 2021 1045  Gross per 24 hour   Intake 620 ml   Output 1775 ml   Net -1155 ml       Physical Exam:   Physical Exam  HENT:      Head: Normocephalic and atraumatic  Nose: Nose normal       Mouth/Throat:      Mouth: Mucous membranes are moist    Eyes:      Extraocular Movements: Extraocular movements intact  Conjunctiva/sclera: Conjunctivae normal    Cardiovascular:      Rate and Rhythm: Normal rate and regular rhythm  Pulmonary:      Effort: Pulmonary effort is normal       Breath sounds: Normal breath sounds  No wheezing or rales  Abdominal:      General: Bowel sounds are normal  There is no distension  Palpations: Abdomen is soft  Tenderness: There is no abdominal tenderness  Musculoskeletal:         General: Swelling (RUE) present  Normal range of motion  Cervical back: Normal range of motion and neck supple  Right lower leg: No edema  Left lower leg: No edema  Skin:     General: Skin is warm and dry  Neurological:      Mental Status: She is alert and oriented to person, place, and time           Additional Data:     Labs:  Results from last 7 days   Lab Units 21  0531 10/29/21  0609   WBC Thousand/uL 4 71 3 13*   HEMOGLOBIN g/dL 8 2* 5 9*   HEMATOCRIT % 25 6* 19 4*   PLATELETS Thousands/uL 213 164   NEUTROS PCT %  --  64   LYMPHS PCT %  --  12* MONOS PCT %  --  22*   EOS PCT %  --  1     Results from last 7 days   Lab Units 11/04/21  0531   SODIUM mmol/L 138   POTASSIUM mmol/L 2 9*   CHLORIDE mmol/L 101   CO2 mmol/L 28   BUN mg/dL 17   CREATININE mg/dL 4 01*   ANION GAP mmol/L 9   CALCIUM mg/dL 8 2*   ALBUMIN g/dL 1 3*   GLUCOSE RANDOM mg/dL 92     Results from last 7 days   Lab Units 11/01/21  0328   INR  1 45*                   Lines/Drains:  Invasive Devices     Peripherally Inserted Central Catheter Line            PICC Line 10/11/21 23 days          Line            Hemodialysis AV Fistula 09/30/21 Right Upper arm 35 days          Hemodialysis Catheter            HD Permanent Double Catheter 14 days          Drain            Urostomy Ileal conduit RUQ 1856 days    Closed/Suction Drain Left Other (Comment) Bulb 10 Fr  8 days    Closed/Suction Drain Left Back Bulb 10 Fr  2 days                Central Line:  Goal for removal: Will discontinue when peripheral access obtained  Imaging: No pertinent imaging reviewed      Recent Cultures (last 7 days):   Results from last 7 days   Lab Units 11/01/21 2004   GRAM STAIN RESULT  2+ Polys  No bacteria seen   BODY FLUID CULTURE, STERILE  No growth       Last 24 Hours Medication List:   Current Facility-Administered Medications   Medication Dose Route Frequency Provider Last Rate    acetaminophen  650 mg Oral Q6H PRN Naya Russell MD      apixaban  2 5 mg Oral BID Brunilda Dutta DO      atorvastatin  40 mg Oral HS Naya Russell MD      calcitriol  0 25 mcg Oral Daily Naya Russell MD      cholecalciferol  800 Units Oral Daily Naya Russell MD      citalopram  20 mg Oral Daily Naya Russell MD      levothyroxine  75 mcg Oral Daily Naya Russell MD      loperamide  2 mg Oral 4x Daily PRN Tenzin Nicholas PA-C      melatonin  3 mg Oral HS Naya Russell MD      metoprolol  2 5 mg Intravenous Q6H PRN Tenzin Nicholas PA-C      metoprolol tartrate  25 mg Oral BID Krys Cabello PA-C      ondansetron  4 mg Intravenous Q6H PRN Ny Portillo MD      pantoprazole  40 mg Oral BID AC Mumtaz Laguerre DO      piperacillin-tazobactam  2 25 g Intravenous Q6H Judi Cuenca MD 2 25 g (11/04/21 1148)    saccharomyces boulardii  250 mg Oral Daily Ny Portillo MD          Today, Patient Was Seen By: Brigitte Larry DO    **Please Note: This note may have been constructed using a voice recognition system  **

## 2021-11-04 NOTE — ASSESSMENT & PLAN NOTE
· Presented with left renal hematoma  Pigtail catheter is noted medial to kidney  Renal pelvis may be collapsed  Hemorrhage and thickening extending in the combined interfascial place of retrospective as well as some high density fluid within     · S/p IR drainage 10/12/21 with JOSE ANTONIO drain placement x 2    · S/p repeat IR drainage 11/1 - Cx neg  · Abx through 7 days post-drainage  · Before removing drain will need CT or tube check

## 2021-11-04 NOTE — ASSESSMENT & PLAN NOTE
· Hx of Polycythemia vera and MDS  · Significant anemia secondary to blood loss in the past    · The patient is currently off of the [facility] treatment for her PV and getting mainly Aranesp on every 28 day basis    · Hematology consulted for evaluation, recommend once patient is better/ stable, discharge, she will follow-up with Dr Anatoliy Portillo, her primary hematologist

## 2021-11-05 LAB
ALBUMIN SERPL BCP-MCNC: 1.3 G/DL (ref 3.5–5)
ALP SERPL-CCNC: 200 U/L (ref 46–116)
ALT SERPL W P-5'-P-CCNC: 7 U/L (ref 12–78)
ANION GAP SERPL CALCULATED.3IONS-SCNC: 8 MMOL/L (ref 4–13)
AST SERPL W P-5'-P-CCNC: 25 U/L (ref 5–45)
BASOPHILS # BLD AUTO: 0.03 THOUSANDS/ΜL (ref 0–0.1)
BASOPHILS NFR BLD AUTO: 1 % (ref 0–1)
BILIRUB SERPL-MCNC: 0.52 MG/DL (ref 0.2–1)
BUN SERPL-MCNC: 11 MG/DL (ref 5–25)
CALCIUM ALBUM COR SERPL-MCNC: 10.5 MG/DL (ref 8.3–10.1)
CALCIUM SERPL-MCNC: 8.3 MG/DL (ref 8.3–10.1)
CHLORIDE SERPL-SCNC: 101 MMOL/L (ref 100–108)
CO2 SERPL-SCNC: 27 MMOL/L (ref 21–32)
CREAT SERPL-MCNC: 2.95 MG/DL (ref 0.6–1.3)
EOSINOPHIL # BLD AUTO: 0.09 THOUSAND/ΜL (ref 0–0.61)
EOSINOPHIL NFR BLD AUTO: 2 % (ref 0–6)
ERYTHROCYTE [DISTWIDTH] IN BLOOD BY AUTOMATED COUNT: 17.9 % (ref 11.6–15.1)
GFR SERPL CREATININE-BSD FRML MDRD: 15 ML/MIN/1.73SQ M
GLUCOSE SERPL-MCNC: 94 MG/DL (ref 65–140)
HCT VFR BLD AUTO: 25.1 % (ref 34.8–46.1)
HGB BLD-MCNC: 7.9 G/DL (ref 11.5–15.4)
IMM GRANULOCYTES # BLD AUTO: 0.06 THOUSAND/UL (ref 0–0.2)
IMM GRANULOCYTES NFR BLD AUTO: 1 % (ref 0–2)
LYMPHOCYTES # BLD AUTO: 0.62 THOUSANDS/ΜL (ref 0.6–4.47)
LYMPHOCYTES NFR BLD AUTO: 13 % (ref 14–44)
MCH RBC QN AUTO: 29 PG (ref 26.8–34.3)
MCHC RBC AUTO-ENTMCNC: 31.5 G/DL (ref 31.4–37.4)
MCV RBC AUTO: 92 FL (ref 82–98)
MONOCYTES # BLD AUTO: 0.48 THOUSAND/ΜL (ref 0.17–1.22)
MONOCYTES NFR BLD AUTO: 10 % (ref 4–12)
NEUTROPHILS # BLD AUTO: 3.5 THOUSANDS/ΜL (ref 1.85–7.62)
NEUTS SEG NFR BLD AUTO: 73 % (ref 43–75)
NRBC BLD AUTO-RTO: 0 /100 WBCS
PLATELET # BLD AUTO: 200 THOUSANDS/UL (ref 149–390)
PMV BLD AUTO: 9.2 FL (ref 8.9–12.7)
POTASSIUM SERPL-SCNC: 3.1 MMOL/L (ref 3.5–5.3)
PROT SERPL-MCNC: 5.7 G/DL (ref 6.4–8.2)
RBC # BLD AUTO: 2.72 MILLION/UL (ref 3.81–5.12)
SODIUM SERPL-SCNC: 136 MMOL/L (ref 136–145)
WBC # BLD AUTO: 4.78 THOUSAND/UL (ref 4.31–10.16)

## 2021-11-05 PROCEDURE — 99232 SBSQ HOSP IP/OBS MODERATE 35: CPT | Performed by: INTERNAL MEDICINE

## 2021-11-05 PROCEDURE — 80053 COMPREHEN METABOLIC PANEL: CPT | Performed by: INTERNAL MEDICINE

## 2021-11-05 PROCEDURE — 99232 SBSQ HOSP IP/OBS MODERATE 35: CPT | Performed by: GENERAL PRACTICE

## 2021-11-05 PROCEDURE — 85025 COMPLETE CBC W/AUTO DIFF WBC: CPT | Performed by: INTERNAL MEDICINE

## 2021-11-05 RX ORDER — POTASSIUM CHLORIDE 20 MEQ/1
40 TABLET, EXTENDED RELEASE ORAL ONCE
Status: COMPLETED | OUTPATIENT
Start: 2021-11-05 | End: 2021-11-05

## 2021-11-05 RX ADMIN — ATORVASTATIN CALCIUM 40 MG: 40 TABLET, FILM COATED ORAL at 22:10

## 2021-11-05 RX ADMIN — PIPERACILLIN AND TAZOBACTAM 2.25 G: 2; .25 INJECTION, POWDER, FOR SOLUTION INTRAVENOUS at 01:14

## 2021-11-05 RX ADMIN — PIPERACILLIN AND TAZOBACTAM 2.25 G: 2; .25 INJECTION, POWDER, FOR SOLUTION INTRAVENOUS at 17:09

## 2021-11-05 RX ADMIN — CALCITRIOL 0.25 MCG: 0.25 CAPSULE, LIQUID FILLED ORAL at 10:54

## 2021-11-05 RX ADMIN — METOPROLOL TARTRATE 25 MG: 25 TABLET, FILM COATED ORAL at 10:53

## 2021-11-05 RX ADMIN — LEVOTHYROXINE SODIUM 75 MCG: 75 TABLET ORAL at 10:53

## 2021-11-05 RX ADMIN — PIPERACILLIN AND TAZOBACTAM 2.25 G: 2; .25 INJECTION, POWDER, FOR SOLUTION INTRAVENOUS at 12:20

## 2021-11-05 RX ADMIN — PIPERACILLIN AND TAZOBACTAM 2.25 G: 2; .25 INJECTION, POWDER, FOR SOLUTION INTRAVENOUS at 23:05

## 2021-11-05 RX ADMIN — APIXABAN 2.5 MG: 2.5 TABLET, FILM COATED ORAL at 10:53

## 2021-11-05 RX ADMIN — POTASSIUM CHLORIDE 40 MEQ: 1500 TABLET, EXTENDED RELEASE ORAL at 10:53

## 2021-11-05 RX ADMIN — PIPERACILLIN AND TAZOBACTAM 2.25 G: 2; .25 INJECTION, POWDER, FOR SOLUTION INTRAVENOUS at 06:32

## 2021-11-05 RX ADMIN — LOPERAMIDE HYDROCHLORIDE 2 MG: 2 CAPSULE ORAL at 17:10

## 2021-11-05 RX ADMIN — PANTOPRAZOLE SODIUM 40 MG: 40 TABLET, DELAYED RELEASE ORAL at 06:36

## 2021-11-05 RX ADMIN — APIXABAN 2.5 MG: 2.5 TABLET, FILM COATED ORAL at 17:11

## 2021-11-05 RX ADMIN — Medication 250 MG: at 10:53

## 2021-11-05 RX ADMIN — METOPROLOL TARTRATE 25 MG: 25 TABLET, FILM COATED ORAL at 17:10

## 2021-11-05 RX ADMIN — PANTOPRAZOLE SODIUM 40 MG: 40 TABLET, DELAYED RELEASE ORAL at 17:10

## 2021-11-05 RX ADMIN — CITALOPRAM HYDROBROMIDE 20 MG: 20 TABLET ORAL at 10:53

## 2021-11-05 RX ADMIN — MELATONIN TAB 3 MG 3 MG: 3 TAB at 22:10

## 2021-11-05 NOTE — PROGRESS NOTES
NEPHROLOGY PROGRESS NOTE   Wilfredo Sawant 77 y o  female MRN: [id number]  Unit/Bed#: [facility] 929-01 Encounter: [id number]  Reason for Consult: ARF      SUMMARY:    52-year-old female with history of advanced chronic kidney disease and obstructive uropathy with a history of hydronephrosis and stent placement who had presented with stent removal prior, polycythemia vera, hypertension presented for abdominal pain and imaging revealed a hematoma the left kidney and had also met sepsis criteria      ASSESSMENT and PLAN:     Acute renal failure advanced underlying chronic kidney disease Stage V  --currently dialysis dependent and deemed end-stage renal disease due to advanced chronic kidney disease  --access:  Right arm AV fistula (s/p  vein transposition on 9/30), utilized 1 needle on Saturday along with left IJ PermCath  right arm is quite swollen, underwent fistulogram on October 27th- recurrent stenosis treated with balloon angioplasty may possibly require stent, discussed with IR currently now patent, recommend elevation and cold compresses at this time  --underwent last dialysis on Saturday October 30th  --currently dialyzing Tuesday Thursday Saturday  --sepsis secondary to left renal hematoma  --underwent dialysis yesterday, no issues was able to ultrafiltrate 1 L, post weight 82 kg  --plan for next dialysis tomorrow     Anemia chronic kidney disease  --not on Epogen due to history of pulmonary embolism  --transfuse for hemoglobin less than 7  --evidence of melena with recent possible upper GI bleed   EGD was unremarkable     Mineral bone disorder-chronic kidney disease  --vitamin-D deficiency  --phosphorus at goal  -- 4, replace vitamin-D stores  --currently on calcitriol 0 25 mcg     Blood pressure/volume status  --history of hypertension  --currently normotensive  --does not examine overtly volume overloaded  --currently on metoprolol 25 mg twice a day (high dialyzed)    Hypokalemia  --will replace potassium     Sepsis  --infected left renal hematoma with chronic obstructive uropathy, recent PCN removal, resulting in bleeding while being maintained on anticoagulation   Underwent IR aspiration and drain placement on October 13th , ongoing follow-up with IR  --CT scan shows persistent collection  --antibiotics as per Infectious Disease  --Fusobacterium bacteremia, unknown source     Polycythemia/MDS  --not on Epogen  --recommend Hematology management, for recommendations on JENELLE      SUBJECTIVE / INTERVAL HISTORY:    No chest pain or shortness of breath  OBJECTIVE:  Current Weight: Weight - Scale: 84 5 kg (186 lb 4 6 oz) (post dialysis weight on 10/30)  Vitals:    11/04/21 1101 11/04/21 1520 11/04/21 2154 11/05/21 0809   BP: 106/67 104/63 124/68 130/73   BP Location:       Pulse: 83 85 76 79   Resp: 18 18 18 18   Temp: 97 7 °F (36 5 °C) 98 2 °F (36 8 °C) 98 4 °F (36 9 °C) (!) 97 1 °F (36 2 °C)   TempSrc:       SpO2: 95% 98% 93% 94%   Weight:       Height:           Intake/Output Summary (Last 24 hours) at 11/5/2021 1031  Last data filed at 11/5/2021 1025  Gross per 24 hour   Intake 770 ml   Output 1530 ml   Net -760 ml       Review of Systems:    12 point ROS has been reviewed  Physical Exam  Vitals and nursing note reviewed  Constitutional:       General: She is not in acute distress  Appearance: She is well-developed  She is obese  HENT:      Head: Normocephalic and atraumatic  Eyes:      General: No scleral icterus  Conjunctiva/sclera: Conjunctivae normal       Pupils: Pupils are equal, round, and reactive to light  Cardiovascular:      Rate and Rhythm: Normal rate and regular rhythm  Heart sounds: S1 normal and S2 normal  No murmur heard  No friction rub  No gallop  Pulmonary:      Effort: Pulmonary effort is normal  No respiratory distress  Breath sounds: Normal breath sounds  No wheezing or rales     Abdominal:      General: Bowel sounds are normal       Palpations: Abdomen is soft  Tenderness: There is no abdominal tenderness  There is no rebound  Musculoskeletal:         General: Swelling present  Normal range of motion  Cervical back: Normal range of motion and neck supple  Right lower leg: Edema present  Left lower leg: Edema present  Skin:     Findings: No rash  Neurological:      Mental Status: She is alert and oriented to person, place, and time     Psychiatric:         Behavior: Behavior normal          Medications:    Current Facility-Administered Medications:     acetaminophen (TYLENOL) tablet 650 mg, 650 mg, Oral, Q6H PRN, Kole Marcelo MD, 650 mg at 11/03/21 1613    apixaban (ELIQUIS) tablet 2 5 mg, 2 5 mg, Oral, BID, Miles Thayer DO, 2 5 mg at 11/04/21 1838    atorvastatin (LIPITOR) tablet 40 mg, 40 mg, Oral, HS, Kole Marcelo MD, 40 mg at 11/04/21 2146    calcitriol (ROCALTROL) capsule 0 25 mcg, 0 25 mcg, Oral, Daily, Kole Marcelo MD, 0 25 mcg at 11/04/21 1141    cholecalciferol (VITAMIN D) oral liquid 800 Units, 800 Units, Oral, Daily, Kole Marcelo MD, 800 Units at 11/04/21 1141    citalopram (CeleXA) tablet 20 mg, 20 mg, Oral, Daily, Kole Marcelo MD, 20 mg at 11/04/21 1140    levothyroxine tablet 75 mcg, 75 mcg, Oral, Daily, Kole Marcelo MD, 75 mcg at 11/04/21 1140    loperamide (IMODIUM) capsule 2 mg, 2 mg, Oral, 4x Daily PRN, Yue Swartz PA-C    melatonin tablet 3 mg, 3 mg, Oral, HS, Kole Marcelo MD, 3 mg at 11/04/21 2146    metoprolol (LOPRESSOR) injection 2 5 mg, 2 5 mg, Intravenous, Q6H PRN, Yue Swartz PA-C, 2 5 mg at 10/20/21 1618    metoprolol tartrate (LOPRESSOR) tablet 25 mg, 25 mg, Oral, BID, Yue Swartz PA-C, 25 mg at 11/04/21 1838    ondansetron [facility]) injection 4 mg, 4 mg, Intravenous, Q6H PRN, Kole Marcelo MD, 4 mg at 10/14/21 0710    pantoprazole (PROTONIX) EC tablet 40 mg, 40 mg, Oral, BID AC, Mumtaz Laguerre DO, 40 mg at 11/05/21 2888   piperacillin-tazobactam (ZOSYN) 2 25 g in sodium chloride 0 9 % 50 mL IVPB, 2 25 g, Intravenous, Q6H, Chey Lay MD, Last Rate: 100 mL/hr at 11/05/21 0632, 2 25 g at 11/05/21 4666    saccharomyces boulardii (FLORASTOR) capsule 250 mg, 250 mg, Oral, Daily, Myrtle Bolanos MD, 250 mg at 11/04/21 1140    Laboratory Results:  Results from last 7 days   Lab Units 11/05/21  0540 11/04/21  0531 11/03/21  0542 11/02/21  0520 11/01/21  0328 10/30/21  0508   WBC Thousand/uL 4 78 4 71 4 85 4 12* 4 00* 3 15*   HEMOGLOBIN g/dL 7 9* 8 2* 9 0* 8 4* 8 4* 8 0*   HEMATOCRIT % 25 1* 25 6* 27 7* 27 0* 26 0* 25 0*   PLATELETS Thousands/uL 200 213 219 207 172 184   POTASSIUM mmol/L 3 1* 2 9*  --  3 5  --   --    CHLORIDE mmol/L 101 101  --  103  --   --    CO2 mmol/L 27 28  --  21  --   --    BUN mg/dL 11 17  --  22  --   --    CREATININE mg/dL 2 95* 4 01*  --  4 65*  --   --    CALCIUM mg/dL 8 3 8 2*  --  8 6  --   --    PHOSPHORUS mg/dL  --  3 4  --   --   --   --

## 2021-11-05 NOTE — ASSESSMENT & PLAN NOTE
· Hx of Polycythemia vera and MDS  · Significant anemia secondary to blood loss in the past    · The patient is currently off of the [facility] treatment for her PV and getting mainly Aranesp on every 28 day basis    · Hematology consulted for evaluation, recommend once patient is better/ stable, discharge, she will follow-up with Dr Lina Sinclair, her primary hematologist

## 2021-11-05 NOTE — PROGRESS NOTES
Progress Note - Infectious Disease   Jhon Meza 77 y o  female MRN: [id number]  Unit/Bed#: [facility] 929-01 Encounter: [id number]      Impression/Plan:  1  Sepsis, POA   Patient presented with progressing flank/abdominal discomfort likely related to problem 3  Clinical parameters had improved  Overall poor progress likely due to 4  Tachycardia/borderline blood pressures 10/28, antibiotics restarted given 3  Currently stable  Continue antibiotics as below  Recheck CBC with diff and CMP  Supportive care     2  Fusobacterium bacteremia   Source unknown, possibly transient given 1 or occult GI source  CT imaging of the abdomen on admission unremarkable  Repeat imaging with new collections noted in the abdomen and partial SBO, potential source   Drains placed   Patient without any teeth  Repeat blood cultures without growth   Not isolated on fluid cultures  No prior C-scope on chart review   EGD unremarkable  Imaging of the right upper quadrant ultrasound unremarkable   Completed empiric course of Flagyl    No further antibiotics for this issue  GI follow-up  C scope once more stable/outpatient     3  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   Patient has a chronic obstructive uropathy involving the left side and recently had PCN removal   Subsequently developed bleeding at the site and hematoma on imaging likely due to chronic anticoagulation   IR aspiration and drain placement on 10/13 and earlier urine cultures with same organisms   Drains likely provided significant source control   IR drain check noted one tube dislodged   Repeat CT scan with persistent collections, uncertain if these are sterile   Status post repeat aspiration with cultures   With changes in vitals on 10/28, antibiotics restarted   Repeat body fluid cultures negative  Continue renally dosed Zosyn through 11/8/2021 to complete 7 days post drain placement  Drain management  Supportive care as per primary  Recommend repeat CT images or IR drain check prior to stopping antibiotics      4  Joseluis Gan on CKD with fistula  Patient with baseline chronic kidney disease with fistula created in the right upper extremity   Stenosis causing swelling  Kidney function worsened over entire admission   Initiated on dialysis  Nephrology follow-up  Hemodialysis  Vascular surgery follow-up/imaging     5  History of C diff and diarrhea   Patient had a history of C diff in   She has had repeat PCRs in 2021 and now negative despite recent diarrhea  Likely related to 9    Antibiotics as above  Monitor stool output  No C diff prophylaxis for now     6  Polycythemia vera and MDS   Recent drop in hemoglobin and leukopenia likely related  Ongoing management/supportive care as per primary   Recommend follow-up with Oncology as outpatient      7  Prior PE on anticoagulation   Likely risk factor for issues as above  AC as per primary        8  Transaminitis   Patient noted to have slowly increasing alkaline phosphatase and AST   In the setting of this bacteremia consider occult abscess  Now downtrending/stable   Liver/RUQ ultrasound unremarkable  LFTs are much improved  Antibiotics as above  GI follow-up     9  Melena   Possible upper GI bleed   Ongoing follow-up and workup as per GI   EGD reportedly unremarkable   Additional care as per primary      Discussed the above management plan in detail the primary service    Will see the patient again 2021  Please call if questions    Antibiotics:  Zosyn 9  Post drain 4  Subjective:  Patient has no fever, chills, sweats; no nausea, vomiting, diarrhea; no cough, shortness of breath; no pain  No new symptoms      Objective:  Vitals:  Temp:  [97 1 °F (36 2 °C)-98 4 °F (36 9 °C)] 97 3 °F (36 3 °C)  HR:  [] 106  Resp:  [18] 18  BP: (104-130)/(63-73) 116/73  SpO2:  [92 %-98 %] 92 %  Temp (24hrs), Av 8 °F (36 6 °C), Min:97 1 °F (36 2 °C), Max:98 4 °F (36 9 °C)  Current: Temperature: (!) 97 3 °F (36 3 °C)    Physical Exam:   General Appearance:  Alert, interactive, nontoxic, no acute distress  Throat: Oropharynx moist without lesions  Lungs:   Clear to auscultation bilaterally; no wheezes, rhonchi or rales; respirations unlabored   Heart:  Tachycardia; no murmur, rub or gallop   Abdomen:   Soft, non-tender, non-distended, positive bowel sounds  Left-sided drains in place  Extremities: No clubbing, cyanosis or edema   Skin: No new rashes or lesions  No draining wounds noted         Labs, Imaging, & Other studies:   All pertinent labs and imaging studies were personally reviewed  Results from last 7 days   Lab Units 11/05/21  0540 11/04/21  0531 11/03/21  0542   WBC Thousand/uL 4 78 4 71 4 85   HEMOGLOBIN g/dL 7 9* 8 2* 9 0*   PLATELETS Thousands/uL 200 213 219     Results from last 7 days   Lab Units 11/05/21  0540 11/04/21  0531 11/02/21  0520   SODIUM mmol/L 136 138 136   POTASSIUM mmol/L 3 1* 2 9* 3 5   CHLORIDE mmol/L 101 101 103   CO2 mmol/L 27 28 21   BUN mg/dL 11 17 22   CREATININE mg/dL 2 95* 4 01* 4 65*   EGFR ml/min/1 73sq m 15 10 9   CALCIUM mg/dL 8 3 8 2* 8 6   AST U/L 25  --   --    ALT U/L 7*  --   --    ALK PHOS U/L 200*  --   --      Results from last 7 days   Lab Units 11/01/21 2004   GRAM STAIN RESULT  2+ Polys  No bacteria seen   BODY FLUID CULTURE, STERILE  No growth

## 2021-11-05 NOTE — CASE MANAGEMENT
CM spoke to pt's son and reviewed referrals with him  Per pt's son he is requesting for CM to send referrals to facilities closer to his home in [facility]  CM reviewed facilities and sent referral as requested  CM will follow

## 2021-11-05 NOTE — ASSESSMENT & PLAN NOTE
· Presented with left renal hematoma  Pigtail catheter is noted medial to kidney  Renal pelvis may be collapsed  Hemorrhage and thickening extending in the combined interfascial place of retrospective as well as some high density fluid within     · S/p IR drainage 10/12/21 with JOSE ANTONIO drain placement x 2    · S/p repeat IR drainage 11/1 - Cx neg  · Abx through 7 days post-drainage  · Monday AM will do CTAP without contrast to eval abscess

## 2021-11-05 NOTE — ASSESSMENT & PLAN NOTE
· Blood cultures from 10/9 growing Fusobacterium - IV Zosyn -> PO Flagyl completed  · Repeat blood cultures 10/12 are negative  · Now on IV Zosyn as below

## 2021-11-05 NOTE — PLAN OF CARE
Problem: MOBILITY - ADULT  Goal: Maintain or return to baseline ADL function  Description: INTERVENTIONS:  -  Assess patient's ability to carry out ADLs; assess patient's baseline for ADL function and identify physical deficits which impact ability to perform ADLs (bathing, care of mouth/teeth, toileting, grooming, dressing, etc )  - Assess/evaluate cause of self-care deficits   - Assess range of motion  - Assess patient's mobility; develop plan if impaired  - Assess patient's need for assistive devices and provide as appropriate  - Encourage maximum independence but intervene and supervise when necessary  - Involve family in performance of ADLs  - Assess for home care needs following discharge   - Consider OT consult to assist with ADL evaluation and planning for discharge  - Provide patient education as appropriate  11/5/2021 0332 by Mi Paez RN  Outcome: Progressing  11/5/2021 0332 by Mi Paez RN  Outcome: Progressing  Goal: Maintains/Returns to pre admission functional level  Description: INTERVENTIONS:  - Perform BMAT or MOVE assessment daily    - Set and communicate daily mobility goal to care team and patient/family/caregiver  - Collaborate with rehabilitation services on mobility goals if consulted  - Perform Range of Motion 3 times a day  - Reposition patient every two hours    - Record patient progress and toleration of activity level   11/5/2021 0332 by Mi Paez RN  Outcome: Progressing  11/5/2021 0332 by Mi Paez RN  Outcome: Progressing     Problem: Potential for Falls  Goal: Patient will remain free of falls  Description: INTERVENTIONS:  - Educate patient/family on patient safety including physical limitations  - Instruct patient to call for assistance with activity   - Consult OT/PT to assist with strengthening/mobility   - Keep Call bell within reach  - Keep bed low and locked with side rails adjusted as appropriate  - Keep care items and personal belongings within reach  - Initiate and maintain comfort rounds  - Make Fall Risk Sign visible to staff  - Offer Toileting every two Hours, in advance of need  - Initiate/Maintain bed alarm  - Obtain necessary fall risk management equipment  - Apply yellow socks and bracelet for high fall risk patients  - Consider moving patient to room near nurses station  11/5/2021 0332 by Ella Cleaning RN  Outcome: Progressing  11/5/2021 0332 by Ella Cleaning RN  Outcome: Progressing     Problem: Prexisting or High Potential for Compromised Skin Integrity  Goal: Skin integrity is maintained or improved  Description: INTERVENTIONS:  - Identify patients at risk for skin breakdown  - Assess and monitor skin integrity  - Assess and monitor nutrition and hydration status  - Monitor labs   - Assess for incontinence   - Turn and reposition patient  - Assist with mobility/ambulation  - Relieve pressure over bony prominences  - Avoid friction and shearing  - Provide appropriate hygiene as needed including keeping skin clean and dry  - Evaluate need for skin moisturizer/barrier cream  - Collaborate with interdisciplinary team   - Patient/family teaching  - Consider wound care consult   11/5/2021 0332 by Elal Cleaning RN  Outcome: Progressing  11/5/2021 0332 by Ella Cleaning RN  Outcome: Progressing     Problem: Nutrition/Hydration-ADULT  Goal: Nutrient/Hydration intake appropriate for improving, restoring or maintaining nutritional needs  Description: Monitor and assess patient's nutrition/hydration status for malnutrition  Collaborate with interdisciplinary team and initiate plan and interventions as ordered  Monitor patient's weight and dietary intake as ordered or per policy  Utilize nutrition screening tool and intervene as necessary  Determine patient's food preferences and provide high-protein, high-caloric foods as appropriate       INTERVENTIONS:  - Monitor oral intake, urinary output, labs, and treatment plans  - Assess nutrition and hydration status and recommend course of action  - Evaluate amount of meals eaten  - Assist patient with eating if necessary   - Allow adequate time for meals  - Recommend/ encourage appropriate diets, oral nutritional supplements, and vitamin/mineral supplements  - Order, calculate, and assess calorie counts as needed  - Recommend, monitor, and adjust tube feedings and TPN/PPN based on assessed needs  - Assess need for intravenous fluids  - Provide specific nutrition/hydration education as appropriate  - Include patient/family/caregiver in decisions related to nutrition  11/5/2021 0332 by Lenore Davidson RN  Outcome: Progressing  11/5/2021 0332 by Lenore Davidson RN  Outcome: Progressing     Problem: METABOLIC, FLUID AND ELECTROLYTES - ADULT  Goal: Electrolytes maintained within normal limits  Description: INTERVENTIONS:  - Monitor labs and assess patient for signs and symptoms of electrolyte imbalances  - Administer electrolyte replacement as ordered  - Monitor response to electrolyte replacements, including repeat lab results as appropriate  - Instruct patient on fluid and nutrition as appropriate  11/5/2021 0332 by Lenore Davidson RN  Outcome: Progressing  11/5/2021 0332 by Lenore Davidson RN  Outcome: Progressing  Goal: Fluid balance maintained  Description: INTERVENTIONS:  - Monitor labs   - Monitor I/O and WT  - Instruct patient on fluid and nutrition as appropriate  - Assess for signs & symptoms of volume excess or deficit  11/5/2021 0332 by Lenore Davidson RN  Outcome: Progressing  11/5/2021 0332 by Lenore Davidson RN  Outcome: Progressing

## 2021-11-05 NOTE — PROGRESS NOTES
[facility]  Progress Note - Inocencio Hayes [birth year], 77 y o  female MRN: [id number]  Unit/Bed#: [facility] 929-01 Encounter: [id number]  Primary Care Provider: Anastasia Fonseca MD   Date and time admitted to hospital: 10/9/2021  2:19 PM    * Sepsis on admission  Assessment & Plan  · Previously with fever coupled with tachycardia and elevated procalcitonin  · Likely secondary to infected left renal hematoma/perinephric abscess -> Fusobacterium bacteremia noted - fluid culture from 10/13 s/p IR-guided drainage growing Enterobacter/Enterococcus    Bacteremia  Assessment & Plan  · Blood cultures from 10/9 growing Fusobacterium - IV Zosyn -> PO Flagyl completed  · Repeat blood cultures 10/12 are negative  · Now on IV Zosyn as below    Perinephric abscess  Assessment & Plan  · Presented with left renal hematoma  Pigtail catheter is noted medial to kidney  Renal pelvis may be collapsed  Hemorrhage and thickening extending in the combined interfascial place of retrospective as well as some high density fluid within  · S/p IR drainage 10/12/21 with JOSE ANTONIO drain placement x 2    · S/p repeat IR drainage 11/1 - Cx neg  · Abx through 7 days post-drainage  · Monday AM will do CTAP without contrast to eval abscess          Swelling of right upper extremity  Assessment & Plan  · Underwent repeat RUE vascular doppler study noting "a narrowing in the subclavian vein at the clavicle created elevated  PSV and turbulent flow  The dialysis AVF appears to be matured with adequate diameter, flow volumes and less than 6mm in depth throughout the arm  Antegrade, high resistant blunted flow noted in the peripheral arteries  No evidence of outflow obstruction  No evidence of a pseudoaneurysm    No evidence of a large venous branch "  · 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  · S/p fistulagram  · Improved with HD  · IR recs compression - no need for repeat fistulogram    Pleural effusion  Assessment & Plan  Mild to moderate pleural effusion on CXR  S/p thoracentesis by IR 10/11/21   -patient currently comfortable on room air, chest x-ray from prior shows persistent left pleural effusion      Acute renal failure superimposed on CKD stage 5  Assessment & Plan  · Progressively worsening renal failure  · Dialysis TTS    Anemia of chronic disease  Assessment & Plan  · Monitor hemoglobin  · s/p 2 units of RBCs on 10/29  · Hb improved    Diarrhea  Assessment & Plan  · Chronic per pt  · Imodium prn    Polycythemia vera ([address] )  Assessment & Plan  · Hx of Polycythemia vera and MDS  · Significant anemia secondary to blood loss in the past    · The patient is currently off of the [facility] treatment for her PV and getting mainly Aranesp on every 28 day basis  · Hematology consulted for evaluation, recommend once patient is better/ stable, discharge, she will follow-up with Dr Jennifer Rose, her primary hematologist     Essential hypertension  Assessment & Plan  · Monitor blood pressures  · Avoid hypotension    Obstructive uropathy  Assessment & Plan  · Obstructive nephropathy  · Chronic bilateral hydronephrosis stents were recently removed  · Urology and Nephrology following; recommend continuing antibiotics and present treatment      History of pulmonary embolism  Assessment & Plan  · C/w low dose Eliquis for VTE prevention        VTE Pharmacologic Prophylaxis: VTE Score: 13 High Risk (Score >/= 5) - Pharmacological DVT Prophylaxis Ordered: apixaban (Eliquis)  Sequential Compression Devices Ordered  Patient Centered Rounds: I performed bedside rounds with nursing staff today  Discussions with Specialists or Other Care Team Provider: ID    Education and Discussions with Family / Patient: Updated  (son) via phone  Time Spent for Care: 30 minutes  More than 50% of total time spent on counseling and coordination of care as described above      Current Length of Stay: 27 day(s)  Current Patient Status: Inpatient   Certification Statement: The patient will continue to require additional inpatient hospital stay due to need for IV Zosyn  Discharge Plan: Anticipate discharge in >72 hrs to rehab facility  Code Status: Level 1 - Full Code    Subjective:   Pt c/o diarrhea    Objective:     Vitals:   Temp (24hrs), Av 9 °F (36 6 °C), Min:97 1 °F (36 2 °C), Max:98 6 °F (37 °C)    Temp:  [97 1 °F (36 2 °C)-98 6 °F (37 °C)] 98 6 °F (37 °C)  HR:  [] 86  Resp:  [18] 18  BP: (113-130)/(63-73) 113/63  SpO2:  [92 %-94 %] 94 %  Body mass index is 36 38 kg/m²  Input and Output Summary (last 24 hours): Intake/Output Summary (Last 24 hours) at 2021 1552  Last data filed at 2021 1025  Gross per 24 hour   Intake 470 ml   Output 30 ml   Net 440 ml       Physical Exam:   Physical Exam  HENT:      Head: Normocephalic and atraumatic  Nose: Nose normal       Mouth/Throat:      Mouth: Mucous membranes are moist    Eyes:      Extraocular Movements: Extraocular movements intact  Conjunctiva/sclera: Conjunctivae normal    Cardiovascular:      Rate and Rhythm: Normal rate and regular rhythm  Pulmonary:      Effort: Pulmonary effort is normal       Breath sounds: Normal breath sounds  No wheezing or rales  Abdominal:      General: Bowel sounds are normal  There is no distension  Palpations: Abdomen is soft  Tenderness: There is no abdominal tenderness  Musculoskeletal:         General: Normal range of motion  Cervical back: Normal range of motion and neck supple  Right lower leg: No edema  Left lower leg: No edema  Skin:     General: Skin is warm and dry  Neurological:      Mental Status: She is alert and oriented to person, place, and time           Additional Data:     Labs:  Results from last 7 days   Lab Units 21  0540   WBC Thousand/uL 4 78   HEMOGLOBIN g/dL 7 9*   HEMATOCRIT % 25 1*   PLATELETS Thousands/uL 200   NEUTROS PCT % 73   LYMPHS PCT % 13*   MONOS PCT % 10   EOS PCT % 2     Results from last 7 days   Lab Units 11/05/21  0540   SODIUM mmol/L 136   POTASSIUM mmol/L 3 1*   CHLORIDE mmol/L 101   CO2 mmol/L 27   BUN mg/dL 11   CREATININE mg/dL 2 95*   ANION GAP mmol/L 8   CALCIUM mg/dL 8 3   ALBUMIN g/dL 1 3*   TOTAL BILIRUBIN mg/dL 0 52   ALK PHOS U/L 200*   ALT U/L 7*   AST U/L 25   GLUCOSE RANDOM mg/dL 94     Results from last 7 days   Lab Units 11/01/21  0328   INR  1 45*                   Lines/Drains:  Invasive Devices     Peripherally Inserted Central Catheter Line            PICC Line 10/11/21 24 days          Line            Hemodialysis AV Fistula 09/30/21 Right Upper arm 36 days          Hemodialysis Catheter            HD Permanent Double Catheter 16 days          Drain            Urostomy Ileal conduit RUQ 1857 days    Closed/Suction Drain Left Other (Comment) Bulb 10 Fr  9 days    Closed/Suction Drain Left Back Bulb 10 Fr  3 days                Central Line:  Goal for removal: Will discontinue when peripheral access obtained  Imaging: No pertinent imaging reviewed      Recent Cultures (last 7 days):   Results from last 7 days   Lab Units 11/01/21 2004   GRAM STAIN RESULT  2+ Polys  No bacteria seen   BODY FLUID CULTURE, STERILE  No growth       Last 24 Hours Medication List:   Current Facility-Administered Medications   Medication Dose Route Frequency Provider Last Rate    acetaminophen  650 mg Oral Q6H PRN Kole Marcelo MD      apixaban  2 5 mg Oral BID Miles Thayer DO      atorvastatin  40 mg Oral HS Kole Marcelo MD      calcitriol  0 25 mcg Oral Daily Kole Marcelo MD      cholecalciferol  800 Units Oral Daily Kole Marcelo MD      citalopram  20 mg Oral Daily Kole Marcelo MD      levothyroxine  75 mcg Oral Daily Kole Marcelo MD      loperamide  2 mg Oral 4x Daily PRN Yue Swartz PA-C      melatonin  3 mg Oral HS Kole Marcelo MD      metoprolol  2 5 mg Intravenous Q6H PRN Paulo Avina PA-C      metoprolol tartrate  25 mg Oral BID Paulo Avina PA-C      ondansetron  4 mg Intravenous Q6H PRN Jarrod Masters MD      pantoprazole  40 mg Oral BID AC Mumtaz Laguerre DO      piperacillin-tazobactam  2 25 g Intravenous Q6H Miley Manuel MD 2 25 g (11/05/21 1220)    saccharomyces boulardii  250 mg Oral Daily Jarrod Masters MD          Today, Patient Was Seen By: Susan Charlton DO    **Please Note: This note may have been constructed using a voice recognition system  **

## 2021-11-06 ENCOUNTER — APPOINTMENT (INPATIENT)
Dept: DIALYSIS | Facility: HOSPITAL | Age: 76
DRG: 981 | End: 2021-11-06
Payer: COMMERCIAL

## 2021-11-06 PROBLEM — E83.52 HYPERCALCEMIA: Status: ACTIVE | Noted: 2021-11-06

## 2021-11-06 LAB
ALBUMIN SERPL BCP-MCNC: 1.2 G/DL (ref 3.5–5)
ANION GAP SERPL CALCULATED.3IONS-SCNC: 6 MMOL/L (ref 4–13)
BUN SERPL-MCNC: 16 MG/DL (ref 5–25)
CALCIUM ALBUM COR SERPL-MCNC: 10.5 MG/DL (ref 8.3–10.1)
CALCIUM SERPL-MCNC: 8.3 MG/DL (ref 8.3–10.1)
CHLORIDE SERPL-SCNC: 106 MMOL/L (ref 100–108)
CO2 SERPL-SCNC: 27 MMOL/L (ref 21–32)
CREAT SERPL-MCNC: 4.29 MG/DL (ref 0.6–1.3)
ERYTHROCYTE [DISTWIDTH] IN BLOOD BY AUTOMATED COUNT: 17.7 % (ref 11.6–15.1)
GFR SERPL CREATININE-BSD FRML MDRD: 9 ML/MIN/1.73SQ M
GLUCOSE SERPL-MCNC: 90 MG/DL (ref 65–140)
HCT VFR BLD AUTO: 24.5 % (ref 34.8–46.1)
HGB BLD-MCNC: 7.6 G/DL (ref 11.5–15.4)
MCH RBC QN AUTO: 29.1 PG (ref 26.8–34.3)
MCHC RBC AUTO-ENTMCNC: 31 G/DL (ref 31.4–37.4)
MCV RBC AUTO: 94 FL (ref 82–98)
PHOSPHATE SERPL-MCNC: 2.9 MG/DL (ref 2.3–4.1)
PLATELET # BLD AUTO: 192 THOUSANDS/UL (ref 149–390)
PMV BLD AUTO: 9.8 FL (ref 8.9–12.7)
POTASSIUM SERPL-SCNC: 3.5 MMOL/L (ref 3.5–5.3)
RBC # BLD AUTO: 2.61 MILLION/UL (ref 3.81–5.12)
SODIUM SERPL-SCNC: 139 MMOL/L (ref 136–145)
WBC # BLD AUTO: 4.91 THOUSAND/UL (ref 4.31–10.16)

## 2021-11-06 PROCEDURE — 85027 COMPLETE CBC AUTOMATED: CPT | Performed by: GENERAL PRACTICE

## 2021-11-06 PROCEDURE — 90935 HEMODIALYSIS ONE EVALUATION: CPT | Performed by: INTERNAL MEDICINE

## 2021-11-06 PROCEDURE — 99232 SBSQ HOSP IP/OBS MODERATE 35: CPT | Performed by: GENERAL PRACTICE

## 2021-11-06 PROCEDURE — 80069 RENAL FUNCTION PANEL: CPT | Performed by: GENERAL PRACTICE

## 2021-11-06 RX ORDER — OMEGA-3S/DHA/EPA/FISH OIL/D3 300MG-1000
800 CAPSULE ORAL DAILY
Status: DISCONTINUED | OUTPATIENT
Start: 2021-11-07 | End: 2021-11-13 | Stop reason: HOSPADM

## 2021-11-06 RX ADMIN — PANTOPRAZOLE SODIUM 40 MG: 40 TABLET, DELAYED RELEASE ORAL at 07:13

## 2021-11-06 RX ADMIN — MELATONIN TAB 3 MG 3 MG: 3 TAB at 22:15

## 2021-11-06 RX ADMIN — PIPERACILLIN AND TAZOBACTAM 2.25 G: 2; .25 INJECTION, POWDER, FOR SOLUTION INTRAVENOUS at 23:17

## 2021-11-06 RX ADMIN — APIXABAN 2.5 MG: 2.5 TABLET, FILM COATED ORAL at 22:15

## 2021-11-06 RX ADMIN — LOPERAMIDE HYDROCHLORIDE 2 MG: 2 CAPSULE ORAL at 08:18

## 2021-11-06 RX ADMIN — CITALOPRAM HYDROBROMIDE 20 MG: 20 TABLET ORAL at 14:15

## 2021-11-06 RX ADMIN — PIPERACILLIN AND TAZOBACTAM 2.25 G: 2; .25 INJECTION, POWDER, FOR SOLUTION INTRAVENOUS at 17:08

## 2021-11-06 RX ADMIN — LOPERAMIDE HYDROCHLORIDE 2 MG: 2 CAPSULE ORAL at 14:15

## 2021-11-06 RX ADMIN — PIPERACILLIN AND TAZOBACTAM 2.25 G: 2; .25 INJECTION, POWDER, FOR SOLUTION INTRAVENOUS at 05:39

## 2021-11-06 RX ADMIN — Medication 250 MG: at 14:15

## 2021-11-06 RX ADMIN — METOPROLOL TARTRATE 25 MG: 25 TABLET, FILM COATED ORAL at 17:09

## 2021-11-06 RX ADMIN — APIXABAN 2.5 MG: 2.5 TABLET, FILM COATED ORAL at 14:15

## 2021-11-06 RX ADMIN — LEVOTHYROXINE SODIUM 75 MCG: 75 TABLET ORAL at 14:15

## 2021-11-06 RX ADMIN — PIPERACILLIN AND TAZOBACTAM 2.25 G: 2; .25 INJECTION, POWDER, FOR SOLUTION INTRAVENOUS at 11:01

## 2021-11-06 RX ADMIN — CALCITRIOL 0.25 MCG: 0.25 CAPSULE, LIQUID FILLED ORAL at 14:15

## 2021-11-06 RX ADMIN — PANTOPRAZOLE SODIUM 40 MG: 40 TABLET, DELAYED RELEASE ORAL at 17:08

## 2021-11-06 RX ADMIN — ATORVASTATIN CALCIUM 40 MG: 40 TABLET, FILM COATED ORAL at 22:15

## 2021-11-06 NOTE — PROCEDURES
Procedure Note - Nephrology   Elham Goldberg 77 y o  female MRN: [id number]  Unit/Bed#: [facility] 929-01 Encounter: [id number]      Assessment / Plan:  1  Acute kidney injury on CKD stage 5, now deemed end-stage renal disease, dialysis dependent, on TTS schedule inpatient  -patient seen and examined by me on hemodialysis today    2  Access-right arm AV fistula status post vein transposition September 30th, has underwent fistulogram on October 27th, recurrent stenosis treated with balloon angioplasty, may possibly require stent, considered patent  -[facility] being used currently for dialysis    3  Anemia of CKD-not on Epogen due to history of PE, transfuse for hemoglobin less than 7, evidence of melena with recent possible upper GI bleed, EGD unremarkable, monitor CBC    4  Secondary hyperparathyroidism renal origin-continue calcitriol 0 25 mcg daily, monitor PTH outpatient    5  Hypertension-BP well controlled, continue metoprolol p o  B i d , UF as tolerated on HD    6  Sepsis due to infected left renal hematoma with concern chronic obstructive uropathy, recent PCN removal, resulted in bleeding while being maintained on anticoagulation, underwent IR aspiration and drain placement October 13th, CT shows persistent collection, on antibiotics per ID, fusobacterium bacteremia of unknown source    7  Polycythemia/MDS-follows with Hematology    8  Hypercalcemia-corrected calcium slightly elevated 10 5, correct with HD, may need calcitriol dose adjustment    Subjective:   Patient denies chest pain or shortness of breath  Patient seen and examined by me on hemodialysis at approximately 10:06 a m     Blood pressure 121/78, UF goal 1 L, blood flow 320 mL/min, dialysate flow 500 mL minute  No complaints  Objective:     Vitals: Blood pressure 121/81, pulse 92, temperature 98 °F (36 7 °C), temperature source Oral, resp  rate 18, height 5' (1 524 m), weight 82 kg (180 lb 12 4 oz), SpO2 97 %, not currently breastfeeding  ,Body mass index is 35 31 kg/m²  Temp (24hrs), Av 1 °F (36 7 °C), Min:97 2 °F (36 2 °C), Max:98 6 °F (37 °C)      Weight (last 2 days)     Date/Time   Weight    21 1640   82 (180 78)    Weight: : post hemodialysis weight at 21 1640                Intake/Output Summary (Last 24 hours) at 2021 1015  Last data filed at 2021 0838  Gross per 24 hour   Intake 990 ml   Output 170 ml   Net 820 ml     I/O last 24 hours: In: 990 [P O :550; I V :280; NG/GT:60; IV Piggyback:100]  Out: 170 [Urine:125; Drains:45]        Physical Exam:   Physical Exam  Vitals and nursing note reviewed  Constitutional:       General: She is not in acute distress  Appearance: Normal appearance  She is well-developed  She is obese  She is not diaphoretic  HENT:      Head: Normocephalic and atraumatic  Mouth/Throat:      Comments: masked  Eyes:      General: No scleral icterus  Right eye: No discharge  Left eye: No discharge  Neck:      Thyroid: No thyromegaly  Cardiovascular:      Rate and Rhythm: Normal rate and regular rhythm  Heart sounds: No murmur heard  Pulmonary:      Effort: Pulmonary effort is normal  No respiratory distress  Breath sounds: Normal breath sounds  No wheezing  Abdominal:      General: Bowel sounds are normal  There is no distension  Palpations: Abdomen is soft  Comments: Urostomy   Musculoskeletal:         General: No swelling  Cervical back: Normal range of motion and neck supple  Skin:     General: Skin is warm and dry  Coloration: Skin is pale  Findings: Bruising (over b/l UE) present  No rash  Neurological:      General: No focal deficit present  Mental Status: She is alert  Motor: No abnormal muscle tone        Comments: awake   Psychiatric:         Mood and Affect: Mood normal          Behavior: Behavior normal          Invasive Devices     Peripherally Inserted Central Catheter Line            PICC Line 10/11/21 25 days          Line            Hemodialysis AV Fistula 09/30/21 Right Upper arm 37 days          Hemodialysis Catheter            HD Permanent Double Catheter 16 days          Drain            Urostomy Ileal conduit RUQ 1858 days    Closed/Suction Drain Left Other (Comment) Bulb 10 Fr  9 days    Closed/Suction Drain Left Back Bulb 10 Fr  4 days                Medications:    Scheduled Meds:  Current Facility-Administered Medications   Medication Dose Route Frequency Provider Last Rate    acetaminophen  650 mg Oral Q6H PRN Ny Portillo MD      apixaban  2 5 mg Oral BID Brigitte Larry,       atorvastatin  40 mg Oral HS Ny Portillo MD      calcitriol  0 25 mcg Oral Daily Ny Portillo MD      cholecalciferol  800 Units Oral Daily Ny Portillo MD      citalopram  20 mg Oral Daily Ny Portillo MD      levothyroxine  75 mcg Oral Daily Ny Portillo MD      loperamide  2 mg Oral 4x Daily PRN Krys Cotton, PA-C      melatonin  3 mg Oral HS Ny Portillo MD      metoprolol  2 5 mg Intravenous Q6H PRN Krys Cotton, PA-C      metoprolol tartrate  25 mg Oral BID Krys Cotton, PA-C      ondansetron  4 mg Intravenous Q6H PRN Ny Portillo MD      pantoprazole  40 mg Oral BID AC Mumtaz Carlotta,       piperacillin-tazobactam  2 25 g Intravenous Q6H Judi Cuenca MD Stopped (11/06/21 0610)    saccharomyces boulardii  250 mg Oral Daily Ny Portillo MD         PRN Meds:   acetaminophen    loperamide    metoprolol    ondansetron    Continuous Infusions:         LAB RESULTS:      Results from last 7 days   Lab Units 11/06/21  0708 11/05/21  0540 11/04/21  0531 11/03/21  0542 11/02/21  0520 11/01/21  0328   WBC Thousand/uL 4 91 4 78 4 71 4 85 4 12* 4 00*   HEMOGLOBIN g/dL 7 6* 7 9* 8 2* 9 0* 8 4* 8 4*   HEMATOCRIT % 24 5* 25 1* 25 6* 27 7* 27 0* 26 0*   PLATELETS Thousands/uL 192 200 213 219 207 172   NEUTROS PCT %  --  73  --   --   --   --    LYMPHS PCT % --  13*  --   --   --   --    MONOS PCT %  --  10  --   --   --   --    EOS PCT %  --  2  --   --   --   --    POTASSIUM mmol/L 3 5 3 1* 2 9*  --  3 5  --    CHLORIDE mmol/L 106 101 101  --  103  --    CO2 mmol/L 27 27 28  --  21  --    BUN mg/dL 16 11 17  --  22  --    CREATININE mg/dL 4 29* 2 95* 4 01*  --  4 65*  --    CALCIUM mg/dL 8 3 8 3 8 2*  --  8 6  --    ALK PHOS U/L  --  200*  --   --   --   --    ALT U/L  --  7*  --   --   --   --    AST U/L  --  25  --   --   --   --    PHOSPHORUS mg/dL 2 9  --  3 4  --   --   --        CUTURES:  Lab Results   Component Value Date    BLOODCX No Growth After 5 Days  10/12/2021    BLOODCX No Growth After 5 Days  10/12/2021    BLOODCX Fusobacterium mortiferum (A) 10/09/2021    BLOODCX Fusobacterium mortiferum (A) 10/09/2021    BLOODCX No Growth After 5 Days  07/15/2021    BLOODCX No Growth After 5 Days  07/15/2021    BLOODCX No Growth After 5 Days  05/23/2019    BLOODCX No Growth After 5 Days  05/23/2019    URINECX >100,000 cfu/ml Klebsiella pneumoniae (A) 10/10/2021    URINECX >100,000 cfu/ml Enterobacter aerogenes (A) 10/10/2021    URINECX 20,000-29,000 cfu/ml Enterococcus faecalis (A) 10/10/2021    URINECX <10,000 cfu/ml Enterococcus avium (A) 10/10/2021    URINECX >100,000 cfu/ml  07/15/2021    URINECX >100,000 cfu/ml Morganella morganii (A) 05/10/2021    URINECX >100,000 cfu/ml Enterococcus faecalis (A) 05/10/2021    URINECX <10,000 cfu/ml Alpha Hemolytic Streptococcus (A) 05/10/2021    URINECX <10,000 cfu/ml Gram Negative Marcel (A) 05/10/2021                 Portions of the record may have been created with voice recognition software  Occasional wrong word or "sound a like" substitutions may have occurred due to the inherent limitations of voice recognition software  Read the chart carefully and recognize, using context, where substitutions have occurred  If you have any questions, please contact the dictating provider

## 2021-11-06 NOTE — ASSESSMENT & PLAN NOTE
· Hx of Polycythemia vera and MDS  · Significant anemia secondary to blood loss in the past    · The patient is currently off of the [facility] treatment for her PV and getting mainly Aranesp on every 28 day basis    · Hematology consulted for evaluation, recommend once patient is better/ stable, discharge, she will follow-up with Dr Kayce Marino, her primary hematologist

## 2021-11-06 NOTE — PLAN OF CARE
Problem: MOBILITY - ADULT  Goal: Maintain or return to baseline ADL function  Description: INTERVENTIONS:  -  Assess patient's ability to carry out ADLs; assess patient's baseline for ADL function and identify physical deficits which impact ability to perform ADLs (bathing, care of mouth/teeth, toileting, grooming, dressing, etc )  - Assess/evaluate cause of self-care deficits   - Assess range of motion  - Assess patient's mobility; develop plan if impaired  - Assess patient's need for assistive devices and provide as appropriate  - Encourage maximum independence but intervene and supervise when necessary  - Involve family in performance of ADLs  - Assess for home care needs following discharge   - Consider OT consult to assist with ADL evaluation and planning for discharge  - Provide patient education as appropriate  Outcome: Progressing  Goal: Maintains/Returns to pre admission functional level  Description: INTERVENTIONS:  - Perform BMAT or MOVE assessment daily    - Set and communicate daily mobility goal to care team and patient/family/caregiver  - Collaborate with rehabilitation services on mobility goals if consulted  - Perform Range of Motion 3 times a day  - Reposition patient every 2 hours    - Dangle patient 3 times a day  - Record patient progress and toleration of activity level   Outcome: Progressing     Problem: Potential for Falls  Goal: Patient will remain free of falls  Description: INTERVENTIONS:  - Educate patient/family on patient safety including physical limitations  - Instruct patient to call for assistance with activity   - Consult OT/PT to assist with strengthening/mobility   - Keep Call bell within reach  - Keep bed low and locked with side rails adjusted as appropriate  - Keep care items and personal belongings within reach  - Initiate and maintain comfort rounds  - Make Fall Risk Sign visible to staff  - Offer Toileting every 2 Hours, in advance of need  - Initiate/Maintain bed alarm  - Obtain necessary fall risk management equipment  - Apply yellow socks and bracelet for high fall risk patients  - Consider moving patient to room near nurses station  Outcome: Progressing     Problem: Prexisting or High Potential for Compromised Skin Integrity  Goal: Skin integrity is maintained or improved  Description: INTERVENTIONS:  - Identify patients at risk for skin breakdown  - Assess and monitor skin integrity  - Assess and monitor nutrition and hydration status  - Monitor labs   - Assess for incontinence   - Turn and reposition patient  - Assist with mobility/ambulation  - Relieve pressure over bony prominences  - Avoid friction and shearing  - Provide appropriate hygiene as needed including keeping skin clean and dry  - Evaluate need for skin moisturizer/barrier cream  - Collaborate with interdisciplinary team   - Patient/family teaching  - Consider wound care consult   Outcome: Progressing     Problem: Nutrition/Hydration-ADULT  Goal: Nutrient/Hydration intake appropriate for improving, restoring or maintaining nutritional needs  Description: Monitor and assess patient's nutrition/hydration status for malnutrition  Collaborate with interdisciplinary team and initiate plan and interventions as ordered  Monitor patient's weight and dietary intake as ordered or per policy  Utilize nutrition screening tool and intervene as necessary  Determine patient's food preferences and provide high-protein, high-caloric foods as appropriate       INTERVENTIONS:  - Monitor oral intake, urinary output, labs, and treatment plans  - Assess nutrition and hydration status and recommend course of action  - Evaluate amount of meals eaten  - Assist patient with eating if necessary   - Allow adequate time for meals  - Recommend/ encourage appropriate diets, oral nutritional supplements, and vitamin/mineral supplements  - Order, calculate, and assess calorie counts as needed  - Recommend, monitor, and adjust tube feedings and TPN/PPN based on assessed needs  - Assess need for intravenous fluids  - Provide specific nutrition/hydration education as appropriate  - Include patient/family/caregiver in decisions related to nutrition  Outcome: Progressing     Problem: METABOLIC, FLUID AND ELECTROLYTES - ADULT  Goal: Electrolytes maintained within normal limits  Description: INTERVENTIONS:  - Monitor labs and assess patient for signs and symptoms of electrolyte imbalances  - Administer electrolyte replacement as ordered  - Monitor response to electrolyte replacements, including repeat lab results as appropriate  - Instruct patient on fluid and nutrition as appropriate  Outcome: Progressing  Goal: Fluid balance maintained  Description: INTERVENTIONS:  - Monitor labs   - Monitor I/O and WT  - Instruct patient on fluid and nutrition as appropriate  - Assess for signs & symptoms of volume excess or deficit  Outcome: Progressing

## 2021-11-06 NOTE — PROGRESS NOTES
[address] [facility]  Progress Note - Tretha Cogan [birth year], 77 y o  female MRN: [id number]  Unit/Bed#: [facility] 929-01 Encounter: [id number]  Primary Care Provider: Sherly Marino MD   Date and time admitted to hospital: 10/9/2021  2:19 PM    * Sepsis on admission  Assessment & Plan  · Previously with fever coupled with tachycardia and elevated procalcitonin  · Likely secondary to infected left renal hematoma/perinephric abscess -> Fusobacterium bacteremia noted - fluid culture from 10/13 s/p IR-guided drainage growing Enterobacter/Enterococcus    Bacteremia  Assessment & Plan  · Blood cultures from 10/9 growing Fusobacterium - IV Zosyn -> PO Flagyl completed  · Repeat blood cultures 10/12 are negative  · Now on IV Zosyn as below    Perinephric abscess  Assessment & Plan  · Presented with left renal hematoma  Pigtail catheter is noted medial to kidney  Renal pelvis may be collapsed  Hemorrhage and thickening extending in the combined interfascial place of retrospective as well as some high density fluid within  · S/p IR drainage 10/12/21 with JOSE ANTONIO drain placement x 2    · S/p repeat IR drainage 11/1 - Cx neg  · Abx through 7 days post-drainage  · Monday AM will do CTAP without contrast to eval abscess          Swelling of right upper extremity  Assessment & Plan  · Underwent repeat RUE vascular doppler study noting "a narrowing in the subclavian vein at the clavicle created elevated  PSV and turbulent flow  The dialysis AVF appears to be matured with adequate diameter, flow volumes and less than 6mm in depth throughout the arm  Antegrade, high resistant blunted flow noted in the peripheral arteries  No evidence of outflow obstruction  No evidence of a pseudoaneurysm    No evidence of a large venous branch "  · 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  · S/p fistulagram  · Improved with HD  · IR recs compression w/ ACE bandage - no need for repeat fistulogram    Pleural effusion  Assessment & Plan  Mild to moderate pleural effusion on CXR  S/p thoracentesis by IR 10/11/21   -patient currently comfortable on room air, chest x-ray from prior shows persistent left pleural effusion      Acute renal failure superimposed on CKD stage 5  Assessment & Plan  · Progressively worsening renal failure  · Dialysis TTS    Anemia of chronic disease  Assessment & Plan  · Monitor hemoglobin  · s/p 2 units of RBCs on 10/29  · Hb improved    Diarrhea  Assessment & Plan  · Chronic per pt  · Imodium prn    Polycythemia vera ([address] )  Assessment & Plan  · Hx of Polycythemia vera and MDS  · Significant anemia secondary to blood loss in the past    · The patient is currently off of the [facility] treatment for her PV and getting mainly Aranesp on every 28 day basis  · Hematology consulted for evaluation, recommend once patient is better/ stable, discharge, she will follow-up with Dr Dieter Bo, her primary hematologist     Essential hypertension  Assessment & Plan  · Monitor blood pressures  · Avoid hypotension    Obstructive uropathy  Assessment & Plan  · Obstructive nephropathy  · Chronic bilateral hydronephrosis stents were recently removed  · Urology and Nephrology following; recommend continuing antibiotics and present treatment      History of pulmonary embolism  Assessment & Plan  · C/w low dose Eliquis for VTE prevention      VTE Pharmacologic Prophylaxis: VTE Score: 13 High Risk (Score >/= 5) - Pharmacological DVT Prophylaxis Ordered: apixaban (Eliquis)  Sequential Compression Devices Ordered  Patient Centered Rounds: I performed bedside rounds with nursing staff today  Discussions with Specialists or Other Care Team Provider: no    Education and Discussions with Family / Patient: Attempted to update  (son) via phone  Left voicemail  Time Spent for Care: 30 minutes   More than 50% of total time spent on counseling and coordination of care as described above     Current Length of Stay: 28 day(s)  Current Patient Status: Inpatient   Certification Statement: The patient will continue to require additional inpatient hospital stay due to need for IV abx and drain  Discharge Plan: Anticipate discharge in >72 hrs to rehab facility  Code Status: Level 1 - Full Code    Subjective:   No acute complaints  Diarrhea slightly improved w/ imodium  Objective:     Vitals:   Temp (24hrs), Av 1 °F (36 7 °C), Min:97 2 °F (36 2 °C), Max:98 6 °F (37 °C)    Temp:  [97 2 °F (36 2 °C)-98 6 °F (37 °C)] 97 6 °F (36 4 °C)  HR:  [79-93] 86  Resp:  [18-20] 18  BP: (113-136)/(63-81) 127/74  SpO2:  [91 %-97 %] 97 %  Body mass index is 35 31 kg/m²  Input and Output Summary (last 24 hours): Intake/Output Summary (Last 24 hours) at 2021 1434  Last data filed at 2021 1134  Gross per 24 hour   Intake 1110 ml   Output 1637 ml   Net -527 ml       Physical Exam:   Physical Exam  HENT:      Head: Normocephalic and atraumatic  Nose: Nose normal       Mouth/Throat:      Mouth: Mucous membranes are moist    Eyes:      Extraocular Movements: Extraocular movements intact  Conjunctiva/sclera: Conjunctivae normal    Cardiovascular:      Rate and Rhythm: Normal rate and regular rhythm  Pulmonary:      Effort: Pulmonary effort is normal       Breath sounds: Normal breath sounds  No wheezing or rales  Abdominal:      General: Bowel sounds are normal  There is no distension  Palpations: Abdomen is soft  Tenderness: There is no abdominal tenderness  Musculoskeletal:         General: Swelling (RUE) present  Normal range of motion  Cervical back: Normal range of motion and neck supple  Skin:     General: Skin is warm and dry  Neurological:      Mental Status: She is alert and oriented to person, place, and time           Additional Data:     Labs:  Results from last 7 days   Lab Units 21  0708 21  0540   WBC Thousand/uL 4 91 4 78   HEMOGLOBIN g/dL 7 6* 7 9*   HEMATOCRIT % 24 5* 25 1*   PLATELETS Thousands/uL 192 200   NEUTROS PCT %  --  73   LYMPHS PCT %  --  13*   MONOS PCT %  --  10   EOS PCT %  --  2     Results from last 7 days   Lab Units 11/06/21  0708 11/05/21  0540   SODIUM mmol/L 139 136   POTASSIUM mmol/L 3 5 3 1*   CHLORIDE mmol/L 106 101   CO2 mmol/L 27 27   BUN mg/dL 16 11   CREATININE mg/dL 4 29* 2 95*   ANION GAP mmol/L 6 8   CALCIUM mg/dL 8 3 8 3   ALBUMIN g/dL 1 2* 1 3*   TOTAL BILIRUBIN mg/dL  --  0 52   ALK PHOS U/L  --  200*   ALT U/L  --  7*   AST U/L  --  25   GLUCOSE RANDOM mg/dL 90 94     Results from last 7 days   Lab Units 11/01/21  0328   INR  1 45*                   Lines/Drains:  Invasive Devices     Peripherally Inserted Central Catheter Line            PICC Line 10/11/21 25 days          Line            Hemodialysis AV Fistula 09/30/21 Right Upper arm 37 days          Hemodialysis Catheter            HD Permanent Double Catheter 16 days          Drain            Urostomy Ileal conduit RUQ 1858 days    Closed/Suction Drain Left Other (Comment) Bulb 10 Fr  10 days    Closed/Suction Drain Left Back Bulb 10 Fr  4 days                Central Line:  Goal for removal: Will discontinue when meds requiring line are completed  Imaging: No pertinent imaging reviewed      Recent Cultures (last 7 days):   Results from last 7 days   Lab Units 11/01/21 2004   GRAM STAIN RESULT  2+ Polys  No bacteria seen   BODY FLUID CULTURE, STERILE  No growth       Last 24 Hours Medication List:   Current Facility-Administered Medications   Medication Dose Route Frequency Provider Last Rate    acetaminophen  650 mg Oral Q6H PRN Nando Aguilera MD      apixaban  2 5 mg Oral BID Asia Kitchen DO      atorvastatin  40 mg Oral HS Nando Aguilera MD      calcitriol  0 25 mcg Oral Daily Nando Aguilera MD      [START ON 11/7/2021] cholecalciferol  800 Units Oral Daily Nando Aguilera MD      citalopram  20 mg Oral Daily Wolfgang WONG Cande Cedeño MD      levothyroxine  75 mcg Oral Daily Tristin Zhang MD      loperamide  2 mg Oral 4x Daily PRN Ellyn Aschoff, PA-C      melatonin  3 mg Oral HS Tristin Zhang MD      metoprolol  2 5 mg Intravenous Q6H PRN Ellyn Aschoff, PA-C      metoprolol tartrate  25 mg Oral BID Ellyn Aschoff, PA-C      ondansetron  4 mg Intravenous Q6H PRN Tristin Zhang MD      pantoprazole  40 mg Oral BID AC Mumtaz Laguerre DO      piperacillin-tazobactam  2 25 g Intravenous Q6H Erlinda Delgado MD 2 25 g (11/06/21 1101)    saccharomyces boulardii  250 mg Oral Daily Tristin Zhang MD          Today, Patient Was Seen By: Devon Damian DO    **Please Note: This note may have been constructed using a voice recognition system  **

## 2021-11-06 NOTE — ASSESSMENT & PLAN NOTE
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

## 2021-11-06 NOTE — HEMODIALYSIS
Post-Dialysis RN Treatment Note    Blood Pressure:  Pre  mm/Hg  Post 127/74 mmHg   EDW  TBD kg    Weight:  Pre 83 kg   Post 81 8 kg   Mode of weight measurement: Bed Scale   Volume Removed  1000 ml    Treatment duration 180 minutes    NS given  no   Treatment shortened?  Yes, describe: 3 hrs today only per Dr Corbett January   Medications given during Rx Zosyn 2 25 g   Estimated Kt/V  1 03   Access type: Permacath/TDC   Access Status: Yes, describe:     Report called to primary nurse   Yes Rainey Brunner

## 2021-11-07 PROCEDURE — 99232 SBSQ HOSP IP/OBS MODERATE 35: CPT | Performed by: INTERNAL MEDICINE

## 2021-11-07 RX ADMIN — PANTOPRAZOLE SODIUM 40 MG: 40 TABLET, DELAYED RELEASE ORAL at 17:09

## 2021-11-07 RX ADMIN — PIPERACILLIN AND TAZOBACTAM 2.25 G: 2; .25 INJECTION, POWDER, FOR SOLUTION INTRAVENOUS at 23:30

## 2021-11-07 RX ADMIN — CITALOPRAM HYDROBROMIDE 20 MG: 20 TABLET ORAL at 08:25

## 2021-11-07 RX ADMIN — CHOLECALCIFEROL TAB 10 MCG (400 UNIT) 800 UNITS: 10 TAB at 11:24

## 2021-11-07 RX ADMIN — CALCITRIOL 0.25 MCG: 0.25 CAPSULE, LIQUID FILLED ORAL at 08:25

## 2021-11-07 RX ADMIN — PANTOPRAZOLE SODIUM 40 MG: 40 TABLET, DELAYED RELEASE ORAL at 06:06

## 2021-11-07 RX ADMIN — PIPERACILLIN AND TAZOBACTAM 2.25 G: 2; .25 INJECTION, POWDER, FOR SOLUTION INTRAVENOUS at 06:06

## 2021-11-07 RX ADMIN — APIXABAN 2.5 MG: 2.5 TABLET, FILM COATED ORAL at 17:09

## 2021-11-07 RX ADMIN — PIPERACILLIN AND TAZOBACTAM 2.25 G: 2; .25 INJECTION, POWDER, FOR SOLUTION INTRAVENOUS at 11:28

## 2021-11-07 RX ADMIN — ATORVASTATIN CALCIUM 40 MG: 40 TABLET, FILM COATED ORAL at 21:14

## 2021-11-07 RX ADMIN — METOPROLOL TARTRATE 25 MG: 25 TABLET, FILM COATED ORAL at 17:09

## 2021-11-07 RX ADMIN — Medication 250 MG: at 08:25

## 2021-11-07 RX ADMIN — MELATONIN TAB 3 MG 3 MG: 3 TAB at 21:13

## 2021-11-07 RX ADMIN — APIXABAN 2.5 MG: 2.5 TABLET, FILM COATED ORAL at 08:25

## 2021-11-07 RX ADMIN — METOPROLOL TARTRATE 25 MG: 25 TABLET, FILM COATED ORAL at 08:25

## 2021-11-07 RX ADMIN — LEVOTHYROXINE SODIUM 75 MCG: 75 TABLET ORAL at 08:25

## 2021-11-07 RX ADMIN — PIPERACILLIN AND TAZOBACTAM 2.25 G: 2; .25 INJECTION, POWDER, FOR SOLUTION INTRAVENOUS at 17:09

## 2021-11-07 NOTE — PLAN OF CARE
Problem: Nutrition/Hydration-ADULT  Goal: Nutrient/Hydration intake appropriate for improving, restoring or maintaining nutritional needs  Description: Monitor and assess patient's nutrition/hydration status for malnutrition  Collaborate with interdisciplinary team and initiate plan and interventions as ordered  Monitor patient's weight and dietary intake as ordered or per policy  Utilize nutrition screening tool and intervene as necessary  Determine patient's food preferences and provide high-protein, high-caloric foods as appropriate       INTERVENTIONS:  - Monitor oral intake, urinary output, labs, and treatment plans  - Assess nutrition and hydration status and recommend course of action  - Evaluate amount of meals eaten  - Assist patient with eating if necessary   - Allow adequate time for meals  - Recommend/ encourage appropriate diets, oral nutritional supplements, and vitamin/mineral supplements  - Order, calculate, and assess calorie counts as needed  - Recommend, monitor, and adjust tube feedings and TPN/PPN based on assessed needs  - Assess need for intravenous fluids  - Provide specific nutrition/hydration education as appropriate  - Include patient/family/caregiver in decisions related to nutrition  Outcome: Progressing     Problem: METABOLIC, FLUID AND ELECTROLYTES - ADULT  Goal: Electrolytes maintained within normal limits  Description: INTERVENTIONS:  - Monitor labs and assess patient for signs and symptoms of electrolyte imbalances  - Administer electrolyte replacement as ordered  - Monitor response to electrolyte replacements, including repeat lab results as appropriate  - Instruct patient on fluid and nutrition as appropriate  Outcome: Progressing  Goal: Fluid balance maintained  Description: INTERVENTIONS:  - Monitor labs   - Monitor I/O and WT  - Instruct patient on fluid and nutrition as appropriate  - Assess for signs & symptoms of volume excess or deficit  Outcome: Progressing

## 2021-11-07 NOTE — ASSESSMENT & PLAN NOTE
· Blood cultures from 10/9 growing Fusobacterium - c/w IV Zosyn regimen  · Repeat blood cultures 10/12 are negative

## 2021-11-07 NOTE — ASSESSMENT & PLAN NOTE
· Chronic bilateral hydronephrosis w/ recent removal of stents  · Followed by urology and nephrology -> recommend continued antibiotic treatment

## 2021-11-07 NOTE — ASSESSMENT & PLAN NOTE
· Previously with fever coupled with tachycardia and elevated procalcitonin  · Likely secondary to infected left renal hematoma/perinephric abscess -> Fusobacterium bacteremia noted - fluid culture from 10/13 s/p IR-guided drainage growing Enterobacter/Enterococcus -> see antibiotic regimen below

## 2021-11-07 NOTE — ASSESSMENT & PLAN NOTE
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

## 2021-11-07 NOTE — ASSESSMENT & PLAN NOTE
· Presented with left renal hematoma  Pigtail catheter is noted medial to kidney  Renal pelvis may be collapsed  Hemorrhage and thickening extending in the combined interfascial place of retrospective as well as some high density fluid within     · S/p IR drainage 10/12 with JOSE ANTONIO drain placement x 2  · S/p repeat IR drainage 11/1 w/ negative cultures  · Plan for repeat CT of abdomen/pelvis tomorrow

## 2021-11-07 NOTE — PROGRESS NOTES
[address] [facility]  Progress Note - Desma Comfort [birth year], 77 y o  female MRN: [id number]  Unit/Bed#: [facility] 929-01 Encounter: [id number]  Primary Care Provider: Omer Chang MD   Date and time admitted to hospital: 10/9/2021  2:19 PM      * Sepsis on admission  Assessment & Plan  · Previously with fever coupled with tachycardia and elevated procalcitonin  · Likely secondary to infected left renal hematoma/perinephric abscess -> Fusobacterium bacteremia noted - fluid culture from 10/13 s/p IR-guided drainage growing Enterobacter/Enterococcus -> see antibiotic regimen below    Perinephric abscess  Assessment & Plan  · Presented with left renal hematoma  Pigtail catheter is noted medial to kidney  Renal pelvis may be collapsed  Hemorrhage and thickening extending in the combined interfascial place of retrospective as well as some high density fluid within  · S/p IR drainage 10/12 with JOSE ANTONIO drain placement x 2  · S/p repeat IR drainage 11/1 w/ negative cultures  · Plan for repeat CT of abdomen/pelvis tomorrow    Acute renal failure superimposed on CKD stage 5  Assessment & Plan  · Currently dialysis dependent - per nephrology  · See plan for associated perinephric abscess and underlying obstructive uropathy below    Bacteremia  Assessment & Plan  · Blood cultures from 10/9 growing Fusobacterium - c/w IV Zosyn regimen  · Repeat blood cultures 10/12 are negative    Swelling of right upper extremity  Assessment & Plan  · Underwent repeat RUE vascular doppler study noting "a narrowing in the subclavian vein at the clavicle created elevated PSV and turbulent flow  The dialysis AVF appears to be matured with adequate diameter, flow volumes and less than 6mm in depth throughout the arm  Antegrade, high resistant blunted flow noted in the peripheral arteries  No evidence of outflow obstruction  No evidence of a pseudoaneurysm    No evidence of a large venous branch "  · 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  · S/p fistulagram  · Improved with hemodialysis  · IR recommending compression w/ ACE bandage - no need for repeat fistulogram    Obstructive uropathy  Assessment & Plan  · Chronic bilateral hydronephrosis w/ recent removal of stents  · Followed by urology and nephrology -> recommend continued antibiotic treatment    Essential hypertension  Assessment & Plan  · Continue Lopressor    History of pulmonary embolism  Assessment & Plan  · Continue Eliquis    Hypothyroidism  Assessment & Plan  · C/w Synthroid     Anemia of chronic disease  Assessment & Plan  · Monitor H/H  · S/p PRBC transfusion on 10/29 - continue to transfuse if necessary    Polycythemia vera   Assessment & Plan  · With associated history of MDS  · Follows outpatient hematology  · Currently off a Jakafi regimen for polycythemia vera - undergoes Aranesp on every four weeks  · Monitor CBC    Diarrhea  Assessment & Plan  · Chronic issue  · PRN Imodium on board      DVT Prophylaxis:  Eliquis       Patient Centered Rounds:  I have performed bedside rounds and discussed plan of care with nursing today  Discussions with Specialists or Other Care Team Provider:  see above assessments if applicable    Education and Discussions with Family / Patient:  Patient at bedside    Time Spent for Care:  32 minutes  More than 50% of total time spent on counseling and coordination of care as described above  Current Length of Stay: 29 day(s)    Current Patient Status: Inpatient   Certification Statement:  Patient will continue to require additional hospital stay due to assessments as noted above  Code Status: Level 1 - Full Code        Subjective:     Seen/examined earlier today  Remains weak/fatigued  Denies worsening pain at this time          Objective:     Vitals:   Temp (24hrs), Av 1 °F (36 7 °C), Min:97 7 °F (36 5 °C), Max:98 5 °F (36 9 °C)    Temp:  [97 7 °F (36 5 °C)-98 5 °F (36 9 °C)] 97 9 °F (36 6 °C)  HR:  [82-91] 82  Resp:  [16-18] 17  BP: (124-131)/(71-76) 124/71  SpO2:  [94 %-96 %] 96 %  Body mass index is 35 31 kg/m²  Input and Output Summary (last 24 hours): Intake/Output Summary (Last 24 hours) at 11/7/2021 1514  Last data filed at 11/7/2021 1301  Gross per 24 hour   Intake 370 ml   Output 25 ml   Net 345 ml       Physical Exam:     GENERAL:  Weak/fatigued  HEAD:  Normocephalic - atraumatic  EYES: PERRL - EOMI   MOUTH:  Mucosa moist  NECK:  Supple - no adenopathy  CARDIAC:  Rate controlled - S1/S2 positive  PULMONARY:  Mildly diminished at the bases - nonlabored respirations at rest  ABDOMEN:  Soft - nontender/nondistended - active bowel sounds  MUSCULOSKELETAL:  Motor strength/range of motion deconditioned - right forearm wrapped - LE edema noted  NEUROLOGIC:  Cognitive impairment present  SKIN:  Chronic wrinkles/blemishes   PSYCHIATRIC:  Mood/affect flat      Additional Data:     Labs & Recent Cultures:    Results from last 7 days   Lab Units 11/06/21  0708 11/05/21  0540 11/05/21  0540   WBC Thousand/uL 4 91   < > 4 78   HEMOGLOBIN g/dL 7 6*   < > 7 9*   HEMATOCRIT % 24 5*   < > 25 1*   PLATELETS Thousands/uL 192   < > 200   NEUTROS PCT %  --   --  73   LYMPHS PCT %  --   --  13*   MONOS PCT %  --   --  10   EOS PCT %  --   --  2    < > = values in this interval not displayed  Results from last 7 days   Lab Units 11/06/21  0708 11/05/21  0540 11/05/21  0540   POTASSIUM mmol/L 3 5   < > 3 1*   CHLORIDE mmol/L 106   < > 101   CO2 mmol/L 27   < > 27   BUN mg/dL 16   < > 11   CREATININE mg/dL 4 29*   < > 2 95*   CALCIUM mg/dL 8 3   < > 8 3   ALK PHOS U/L  --   --  200*   ALT U/L  --   --  7*   AST U/L  --   --  25    < > = values in this interval not displayed       Results from last 7 days   Lab Units 11/01/21  0328   INR  1 45*                     Results from last 7 days   Lab Units 11/01/21 2004   GRAM STAIN RESULT  2+ Polys  No bacteria seen   BODY FLUID CULTURE, STERILE  No growth         Last 24 Hours Medication List:   Current Facility-Administered Medications   Medication Dose Route Frequency Provider Last Rate    acetaminophen  650 mg Oral Q6H PRN Kole Marcelo MD      apixaban  2 5 mg Oral BID Miles Thayer DO      atorvastatin  40 mg Oral HS Kole Marcelo MD      calcitriol  0 25 mcg Oral Daily Kole Marcelo MD      cholecalciferol  800 Units Oral Daily Kole Marcelo MD      citalopram  20 mg Oral Daily Kole Marcelo MD      levothyroxine  75 mcg Oral Daily Kole Marcelo MD      loperamide  2 mg Oral 4x Daily PRN Yue Swartz PA-C      melatonin  3 mg Oral HS Kole Marcelo MD      metoprolol  2 5 mg Intravenous Q6H PRN Yue Swartz PA-C      metoprolol tartrate  25 mg Oral BID Yue Swartz PA-C      ondansetron  4 mg Intravenous Q6H PRN Kole Marcelo MD      pantoprazole  40 mg Oral BID LAURO Laguerre DO      piperacillin-tazobactam  2 25 g Intravenous Q6H Brinda Mejias MD Stopped (11/07/21 1212)    saccharomyces boulardii  250 mg Oral Daily Kole Marcelo MD                      ** Please Note: This note is constructed using a voice recognition dictation system  An occasional wrong word/phrase or sound-a-like substitution may have been picked up by dictation device due to the inherent limitations of voice recognition software  Read the chart carefully and recognize, using reasonable context, where substitutions may have occurred  **

## 2021-11-07 NOTE — ASSESSMENT & PLAN NOTE
· Currently dialysis dependent - per nephrology  · See plan for associated perinephric abscess and underlying obstructive uropathy below

## 2021-11-07 NOTE — PLAN OF CARE
Problem: MOBILITY - ADULT  Goal: Maintain or return to baseline ADL function  Description: INTERVENTIONS:  -  Assess patient's ability to carry out ADLs; assess patient's baseline for ADL function and identify physical deficits which impact ability to perform ADLs (bathing, care of mouth/teeth, toileting, grooming, dressing, etc )  - Assess/evaluate cause of self-care deficits   - Assess range of motion  - Assess patient's mobility; develop plan if impaired  - Assess patient's need for assistive devices and provide as appropriate  - Encourage maximum independence but intervene and supervise when necessary  - Involve family in performance of ADLs  - Assess for home care needs following discharge   - Consider OT consult to assist with ADL evaluation and planning for discharge  - Provide patient education as appropriate  Outcome: Progressing  Goal: Maintains/Returns to pre admission functional level  Description: INTERVENTIONS:  - Perform BMAT or MOVE assessment daily    - Set and communicate daily mobility goal to care team and patient/family/caregiver  - Collaborate with rehabilitation services on mobility goals if consulted  - Perform Range of Motion 3 times a day  - Reposition patient every 2 hours    - Record patient progress and toleration of activity level   Outcome: Progressing     Problem: Potential for Falls  Goal: Patient will remain free of falls  Description: INTERVENTIONS:  - Educate patient/family on patient safety including physical limitations  - Instruct patient to call for assistance with activity   - Consult OT/PT to assist with strengthening/mobility   - Keep Call bell within reach  - Keep bed low and locked with side rails adjusted as appropriate  - Keep care items and personal belongings within reach  - Initiate and maintain comfort rounds  - Make Fall Risk Sign visible to staff  - Offer Toileting every 2 Hours, in advance of need  - Initiate/Maintain bed alarm  - Obtain necessary fall risk management equipment  - Apply yellow socks and bracelet for high fall risk patients  - Consider moving patient to room near nurses station  Outcome: Progressing     Problem: Prexisting or High Potential for Compromised Skin Integrity  Goal: Skin integrity is maintained or improved  Description: INTERVENTIONS:  - Identify patients at risk for skin breakdown  - Assess and monitor skin integrity  - Assess and monitor nutrition and hydration status  - Monitor labs   - Assess for incontinence   - Turn and reposition patient  - Assist with mobility/ambulation  - Relieve pressure over bony prominences  - Avoid friction and shearing  - Provide appropriate hygiene as needed including keeping skin clean and dry  - Evaluate need for skin moisturizer/barrier cream  - Collaborate with interdisciplinary team   - Patient/family teaching  - Consider wound care consult   Outcome: Progressing     Problem: Nutrition/Hydration-ADULT  Goal: Nutrient/Hydration intake appropriate for improving, restoring or maintaining nutritional needs  Description: Monitor and assess patient's nutrition/hydration status for malnutrition  Collaborate with interdisciplinary team and initiate plan and interventions as ordered  Monitor patient's weight and dietary intake as ordered or per policy  Utilize nutrition screening tool and intervene as necessary  Determine patient's food preferences and provide high-protein, high-caloric foods as appropriate       INTERVENTIONS:  - Monitor oral intake, urinary output, labs, and treatment plans  - Assess nutrition and hydration status and recommend course of action  - Evaluate amount of meals eaten  - Assist patient with eating if necessary   - Allow adequate time for meals  - Recommend/ encourage appropriate diets, oral nutritional supplements, and vitamin/mineral supplements  - Order, calculate, and assess calorie counts as needed  - Recommend, monitor, and adjust tube feedings and TPN/PPN based on assessed needs  - Assess need for intravenous fluids  - Provide specific nutrition/hydration education as appropriate  - Include patient/family/caregiver in decisions related to nutrition  Outcome: Progressing     Problem: METABOLIC, FLUID AND ELECTROLYTES - ADULT  Goal: Electrolytes maintained within normal limits  Description: INTERVENTIONS:  - Monitor labs and assess patient for signs and symptoms of electrolyte imbalances  - Administer electrolyte replacement as ordered  - Monitor response to electrolyte replacements, including repeat lab results as appropriate  - Instruct patient on fluid and nutrition as appropriate  Outcome: Progressing  Goal: Fluid balance maintained  Description: INTERVENTIONS:  - Monitor labs   - Monitor I/O and WT  - Instruct patient on fluid and nutrition as appropriate  - Assess for signs & symptoms of volume excess or deficit  Outcome: Progressing

## 2021-11-07 NOTE — ASSESSMENT & PLAN NOTE
· With associated history of MDS  · Follows outpatient hematology  · Currently off a Jakafi regimen for polycythemia vera - undergoes Aranesp on every four weeks  · Monitor CBC

## 2021-11-08 ENCOUNTER — APPOINTMENT (INPATIENT)
Dept: RADIOLOGY | Facility: HOSPITAL | Age: 76
DRG: 981 | End: 2021-11-08
Payer: COMMERCIAL

## 2021-11-08 LAB
ANION GAP SERPL CALCULATED.3IONS-SCNC: 3 MMOL/L (ref 4–13)
ANISOCYTOSIS BLD QL SMEAR: PRESENT
BASOPHILS # BLD MANUAL: 0 THOUSAND/UL (ref 0–0.1)
BASOPHILS NFR MAR MANUAL: 0 % (ref 0–1)
BUN SERPL-MCNC: 19 MG/DL (ref 5–25)
CALCIUM SERPL-MCNC: 8.7 MG/DL (ref 8.3–10.1)
CHLORIDE SERPL-SCNC: 104 MMOL/L (ref 100–108)
CO2 SERPL-SCNC: 28 MMOL/L (ref 21–32)
CREAT SERPL-MCNC: 4.4 MG/DL (ref 0.6–1.3)
EOSINOPHIL # BLD MANUAL: 0.1 THOUSAND/UL (ref 0–0.4)
EOSINOPHIL NFR BLD MANUAL: 2 % (ref 0–6)
ERYTHROCYTE [DISTWIDTH] IN BLOOD BY AUTOMATED COUNT: 17.9 % (ref 11.6–15.1)
GFR SERPL CREATININE-BSD FRML MDRD: 9 ML/MIN/1.73SQ M
GLUCOSE SERPL-MCNC: 95 MG/DL (ref 65–140)
HCT VFR BLD AUTO: 24.7 % (ref 34.8–46.1)
HGB BLD-MCNC: 7.7 G/DL (ref 11.5–15.4)
LYMPHOCYTES # BLD AUTO: 0.56 THOUSAND/UL (ref 0.6–4.47)
LYMPHOCYTES # BLD AUTO: 11 % (ref 14–44)
MCH RBC QN AUTO: 29.3 PG (ref 26.8–34.3)
MCHC RBC AUTO-ENTMCNC: 31.2 G/DL (ref 31.4–37.4)
MCV RBC AUTO: 94 FL (ref 82–98)
MONOCYTES # BLD AUTO: 0.15 THOUSAND/UL (ref 0–1.22)
MONOCYTES NFR BLD: 3 % (ref 4–12)
MYELOCYTES NFR BLD MANUAL: 1 % (ref 0–1)
NEUTROPHILS # BLD MANUAL: 4.24 THOUSAND/UL (ref 1.85–7.62)
NEUTS BAND NFR BLD MANUAL: 3 % (ref 0–8)
NEUTS SEG NFR BLD AUTO: 80 % (ref 43–75)
PLATELET # BLD AUTO: 177 THOUSANDS/UL (ref 149–390)
PLATELET BLD QL SMEAR: ADEQUATE
PMV BLD AUTO: 9.5 FL (ref 8.9–12.7)
POIKILOCYTOSIS BLD QL SMEAR: PRESENT
POTASSIUM SERPL-SCNC: 3.7 MMOL/L (ref 3.5–5.3)
RBC # BLD AUTO: 2.63 MILLION/UL (ref 3.81–5.12)
RBC MORPH BLD: PRESENT
SODIUM SERPL-SCNC: 135 MMOL/L (ref 136–145)
WBC # BLD AUTO: 5.11 THOUSAND/UL (ref 4.31–10.16)

## 2021-11-08 PROCEDURE — 74176 CT ABD & PELVIS W/O CONTRAST: CPT

## 2021-11-08 PROCEDURE — 99233 SBSQ HOSP IP/OBS HIGH 50: CPT | Performed by: INTERNAL MEDICINE

## 2021-11-08 PROCEDURE — 99232 SBSQ HOSP IP/OBS MODERATE 35: CPT | Performed by: INTERNAL MEDICINE

## 2021-11-08 PROCEDURE — 85027 COMPLETE CBC AUTOMATED: CPT | Performed by: INTERNAL MEDICINE

## 2021-11-08 PROCEDURE — 80048 BASIC METABOLIC PNL TOTAL CA: CPT | Performed by: INTERNAL MEDICINE

## 2021-11-08 PROCEDURE — 97530 THERAPEUTIC ACTIVITIES: CPT

## 2021-11-08 PROCEDURE — 97110 THERAPEUTIC EXERCISES: CPT

## 2021-11-08 PROCEDURE — 85007 BL SMEAR W/DIFF WBC COUNT: CPT | Performed by: INTERNAL MEDICINE

## 2021-11-08 PROCEDURE — G1004 CDSM NDSC: HCPCS

## 2021-11-08 RX ADMIN — MELATONIN TAB 3 MG 3 MG: 3 TAB at 21:38

## 2021-11-08 RX ADMIN — LOPERAMIDE HYDROCHLORIDE 2 MG: 2 CAPSULE ORAL at 21:38

## 2021-11-08 RX ADMIN — PIPERACILLIN AND TAZOBACTAM 2.25 G: 2; .25 INJECTION, POWDER, FOR SOLUTION INTRAVENOUS at 13:10

## 2021-11-08 RX ADMIN — LEVOTHYROXINE SODIUM 75 MCG: 75 TABLET ORAL at 09:47

## 2021-11-08 RX ADMIN — PIPERACILLIN AND TAZOBACTAM 2.25 G: 2; .25 INJECTION, POWDER, FOR SOLUTION INTRAVENOUS at 22:54

## 2021-11-08 RX ADMIN — CALCITRIOL 0.25 MCG: 0.25 CAPSULE, LIQUID FILLED ORAL at 09:47

## 2021-11-08 RX ADMIN — METOPROLOL TARTRATE 25 MG: 25 TABLET, FILM COATED ORAL at 09:46

## 2021-11-08 RX ADMIN — ATORVASTATIN CALCIUM 40 MG: 40 TABLET, FILM COATED ORAL at 21:38

## 2021-11-08 RX ADMIN — PANTOPRAZOLE SODIUM 40 MG: 40 TABLET, DELAYED RELEASE ORAL at 17:24

## 2021-11-08 RX ADMIN — PANTOPRAZOLE SODIUM 40 MG: 40 TABLET, DELAYED RELEASE ORAL at 05:00

## 2021-11-08 RX ADMIN — PIPERACILLIN AND TAZOBACTAM 2.25 G: 2; .25 INJECTION, POWDER, FOR SOLUTION INTRAVENOUS at 17:25

## 2021-11-08 RX ADMIN — PIPERACILLIN AND TAZOBACTAM 2.25 G: 2; .25 INJECTION, POWDER, FOR SOLUTION INTRAVENOUS at 04:58

## 2021-11-08 RX ADMIN — APIXABAN 2.5 MG: 2.5 TABLET, FILM COATED ORAL at 17:24

## 2021-11-08 RX ADMIN — APIXABAN 2.5 MG: 2.5 TABLET, FILM COATED ORAL at 09:47

## 2021-11-08 RX ADMIN — CHOLECALCIFEROL TAB 10 MCG (400 UNIT) 800 UNITS: 10 TAB at 09:47

## 2021-11-08 RX ADMIN — METOPROLOL TARTRATE 25 MG: 25 TABLET, FILM COATED ORAL at 17:24

## 2021-11-08 RX ADMIN — CITALOPRAM HYDROBROMIDE 20 MG: 20 TABLET ORAL at 09:47

## 2021-11-08 RX ADMIN — Medication 250 MG: at 09:46

## 2021-11-08 NOTE — PROGRESS NOTES
[address] [facility]  Progress Note - Malick  [birth year], 77 y o  female MRN: [id number]  Unit/Bed#: [facility] 929-01 Encounter: [id number]  Primary Care Provider: Reese Coats MD   Date and time admitted to hospital: 10/9/2021  2:19 PM    * Sepsis on admission  Assessment & Plan  · Previously with fever coupled with tachycardia and elevated procalcitonin  · Likely secondary to infected left renal hematoma/perinephric abscess -> Fusobacterium bacteremia noted - fluid culture from 10/13 s/p IR-guided drainage growing Enterobacter/Enterococcus -> see antibiotic regimen below    Bacteremia  Assessment & Plan  · Blood cultures from 10/9 growing Fusobacterium - c/w IV Zosyn regimen  · Repeat blood cultures 10/12 are negative    Perinephric abscess  Assessment & Plan  · Presented with left renal hematoma  Pigtail catheter is noted medial to kidney  Renal pelvis may be collapsed  Hemorrhage and thickening extending in the combined interfascial place of retrospective as well as some high density fluid within  · S/p IR drainage 10/12 with JOSE ANTONIO drain placement x 2  · S/p repeat IR drainage 11/1 w/ negative cultures  · S/p Ct scan this AM with improved abscesses, will d/w IR whether the drains can be removed now  Hypothyroidism  Assessment & Plan  · C/w Synthroid     Swelling of right upper extremity  Assessment & Plan  · Underwent repeat RUE vascular doppler study noting "a narrowing in the subclavian vein at the clavicle created elevated PSV and turbulent flow  The dialysis AVF appears to be matured with adequate diameter, flow volumes and less than 6mm in depth throughout the arm  Antegrade, high resistant blunted flow noted in the peripheral arteries  No evidence of outflow obstruction  No evidence of a pseudoaneurysm    No evidence of a large venous branch "  · 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  · S/p fistulagram  · Improved with hemodialysis  · IR recommending compression w/ ACE bandage - no need for repeat fistulogram    Pleural effusion  Assessment & Plan  Mild to moderate pleural effusion on CXR  S/p thoracentesis by IR 10/11/21   -patient currently comfortable on room air, chest x-ray from prior shows persistent left pleural effusion      Acute renal failure superimposed on CKD stage 5  Assessment & Plan  · Currently dialysis dependent - per nephrology  · See plan for associated perinephric abscess and underlying obstructive uropathy below    Anemia of chronic disease  Assessment & Plan  · Monitor H/H  · S/p PRBC transfusion on 10/29 - continue to transfuse if necessary    Diarrhea  Assessment & Plan  · Chronic issue  · PRN Imodium on board    Polycythemia vera   Assessment & Plan  · With associated history of MDS  · Follows outpatient hematology  · Currently off a Jakafi regimen for polycythemia vera - undergoes Aranesp on every four weeks  · Monitor CBC    Essential hypertension  Assessment & Plan  · Continue Lopressor    Obstructive uropathy  Assessment & Plan  · Chronic bilateral hydronephrosis w/ recent removal of stents  · Followed by urology and nephrology -> recommend continued antibiotic treatment    History of pulmonary embolism  Assessment & Plan  · Continue Eliquis        VTE Pharmacologic Prophylaxis:   Pharmacologic: Apixaban (Eliquis)  Mechanical VTE Prophylaxis in Place: Yes    Patient Centered Rounds: I have performed bedside rounds with nursing staff today  Discussions with Specialists or Other Care Team Provider: IR    Education and Discussions with Family / Patient: patient, plan of care    Time Spent for Care: 20 minutes  More than 50% of total time spent on counseling and coordination of care as described above      Current Length of Stay: 30 day(s)    Current Patient Status: Inpatient   Certification Statement: The patient will continue to require additional inpatient hospital stay due to tube check    Discharge Plan: SNF when stable, likely in 48h or so    Code Status: Level 1 - Full Code      Subjective:   Denies any new complaints  Tolerating diet  Objective:     Vitals:   Temp (24hrs), Av 9 °F (36 6 °C), Min:97 6 °F (36 4 °C), Max:98 2 °F (36 8 °C)    Temp:  [97 6 °F (36 4 °C)-98 2 °F (36 8 °C)] 98 2 °F (36 8 °C)  HR:  [77-81] 81  Resp:  [18-20] 20  BP: (116-131)/(67-75) 131/75  SpO2:  [90 %-94 %] 94 %  Body mass index is 35 31 kg/m²  Input and Output Summary (last 24 hours): Intake/Output Summary (Last 24 hours) at 2021 1441  Last data filed at 2021 0739  Gross per 24 hour   Intake 480 ml   Output 181 ml   Net 299 ml       Physical Exam:     Physical Exam  Constitutional:       General: She is not in acute distress  Appearance: Normal appearance  She is obese  She is ill-appearing  HENT:      Head: Normocephalic and atraumatic  Nose: Nose normal       Mouth/Throat:      Mouth: Mucous membranes are moist       Pharynx: Oropharynx is clear  Eyes:      Extraocular Movements: Extraocular movements intact  Cardiovascular:      Rate and Rhythm: Normal rate and regular rhythm  Pulmonary:      Effort: Pulmonary effort is normal       Breath sounds: No wheezing or rales  Abdominal:      Comments: Abdominal drains   Musculoskeletal:      Right lower leg: No edema  Left lower leg: No edema  Comments: RUE edema   Neurological:      General: No focal deficit present  Mental Status: She is alert and oriented to person, place, and time     Psychiatric:         Mood and Affect: Mood normal          Behavior: Behavior normal            Additional Data:     Labs:    Results from last 7 days   Lab Units 21  0458 21  0708 21  0540   WBC Thousand/uL 5 11   < > 4 78   HEMOGLOBIN g/dL 7 7*   < > 7 9*   HEMATOCRIT % 24 7*   < > 25 1*   PLATELETS Thousands/uL 177   < > 200   BANDS PCT % 3  --   --    NEUTROS PCT %  --   --  73   LYMPHS PCT %  -- --  13*   LYMPHO PCT % 11*  --   --    MONOS PCT %  --   --  10   MONO PCT % 3*  --   --    EOS PCT % 2  --  2    < > = values in this interval not displayed  Results from last 7 days   Lab Units 11/08/21  0458 11/06/21  0708 11/06/21  0708 11/05/21  0540 11/05/21  0540   SODIUM mmol/L 135*   < > 139   < > 136   POTASSIUM mmol/L 3 7   < > 3 5   < > 3 1*   CHLORIDE mmol/L 104   < > 106   < > 101   CO2 mmol/L 28   < > 27   < > 27   BUN mg/dL 19   < > 16   < > 11   CREATININE mg/dL 4 40*   < > 4 29*   < > 2 95*   ANION GAP mmol/L 3*   < > 6   < > 8   CALCIUM mg/dL 8 7   < > 8 3   < > 8 3   ALBUMIN g/dL  --   --  1 2*   < > 1 3*   TOTAL BILIRUBIN mg/dL  --   --   --   --  0 52   ALK PHOS U/L  --   --   --   --  200*   ALT U/L  --   --   --   --  7*   AST U/L  --   --   --   --  25   GLUCOSE RANDOM mg/dL 95   < > 90   < > 94    < > = values in this interval not displayed  * I Have Reviewed All Lab Data Listed Above  * Additional Pertinent Lab Tests Reviewed:  All Labs Within Last 24 Hours Reviewed    Imaging:    Imaging Reports Reviewed Today Include: CT abdomen    Recent Cultures (last 7 days):     Results from last 7 days   Lab Units 11/01/21 2004   GRAM STAIN RESULT  2+ Polys  No bacteria seen   BODY FLUID CULTURE, STERILE  No growth       Last 24 Hours Medication List:   Current Facility-Administered Medications   Medication Dose Route Frequency Provider Last Rate    acetaminophen  650 mg Oral Q6H PRN Vishal Stewart MD      apixaban  2 5 mg Oral BID  Massed, DO      atorvastatin  40 mg Oral HS Vishal Stewart MD      calcitriol  0 25 mcg Oral Daily Vishal Stewart MD      cholecalciferol  800 Units Oral Daily Vishal Stewart MD      citalopram  20 mg Oral Daily Vishal Stewart MD      levothyroxine  75 mcg Oral Daily Vishal Stewart MD      loperamide  2 mg Oral 4x Daily PRN Tunde Briggs PA-C      melatonin  3 mg Oral HS MD Ace Fortunes metoprolol  2 5 mg Intravenous Q6H PRN Asya Liter, DIMITRI      metoprolol tartrate  25 mg Oral BID Asya Liter, DIMITRI      ondansetron  4 mg Intravenous Q6H PRN Dmitriy Lyons MD      pantoprazole  40 mg Oral BID AC Mumtaz Laguerre DO      piperacillin-tazobactam  2 25 g Intravenous Q6H Mimi Chavarria MD 2 25 g (11/08/21 1310)    saccharomyces boulardii  250 mg Oral Daily Dmitriy Lyons MD          Today, Patient Was Seen By: Sergio Myles MD    ** Please Note: Dictation voice to text software may have been used in the creation of this document   **

## 2021-11-08 NOTE — ASSESSMENT & PLAN NOTE
· Presented with left renal hematoma  Pigtail catheter is noted medial to kidney  Renal pelvis may be collapsed  Hemorrhage and thickening extending in the combined interfascial place of retrospective as well as some high density fluid within  · S/p IR drainage 10/12 with JOSE ANTONIO drain placement x 2  · S/p repeat IR drainage 11/1 w/ negative cultures  · S/p Ct scan this AM with improved abscesses, will d/w IR whether the drains can be removed now

## 2021-11-08 NOTE — PLAN OF CARE
Problem: PHYSICAL THERAPY ADULT  Goal: Performs mobility at highest level of function for planned discharge setting  See evaluation for individualized goals  Description: Treatment/Interventions: Patient/family training, LE strengthening/ROM, Bed mobility, Spoke to nursing, Spoke to case management, OT  Equipment Recommended:  (TBD )       See flowsheet documentation for full assessment, interventions and recommendations  Outcome: Progressing  Note: Prognosis: Fair  Problem List: Decreased strength,Decreased endurance,Impaired balance,Decreased mobility,Decreased skin integrity,Obesity  Assessment: PT initiated treatment session in order to assist patient in achieving goals to improve overall activity tolerance, transfers, and ambulation  Patient agreeable to participation however fatigued which limited mobility  She required mod-Ax2 for all aspects of mobility today and initiated 2 bouts of side-stepping  Steps are limited by LE weakness and R knee buckling  Post treatment session patient was supine in bed and repositioned with wedges to offload buttock  This patient continues to function below her baseline and is recommended for rehab upon d/c  She will benefit from continued skilled PT this admission to achieve maximal function and safety  Barriers to Discharge: Inaccessible home environment,Decreased caregiver support        PT Discharge Recommendation: Post acute rehabilitation services     PT - OK to Discharge: Yes (to rehab when med edward )    See flowsheet documentation for full assessment

## 2021-11-08 NOTE — PHYSICAL THERAPY NOTE
PT TREATMENT       11/08/21 1455   PT Last Visit   PT Visit Date 11/08/21   Note Type   Note Type Treatment   Pain Assessment   Pain Assessment Tool 0-10   Pain Score No Pain   Restrictions/Precautions   Other Precautions Multiple lines; Fall Risk  (2 JOSE ANTONIO DRAINS)   General   Chart Reviewed Yes   Response to Previous Treatment Patient with no complaints from previous session  Family/Caregiver Present No   Cognition   Arousal/Participation Alert; Cooperative   Attention Within functional limits   Following Commands Follows multistep commands with increased time or repetition   Comments cooperative, lethargic    Subjective   Subjective "im so tired"   Bed Mobility   Supine to Sit 3  Moderate assistance   Additional items Assist x 2; Increased time required;Verbal cues   Sit to Supine 3  Moderate assistance   Additional items Assist x 2; Increased time required;Verbal cues   Transfers   Sit to Stand 3  Moderate assistance   Additional items Assist x 2; Increased time required;Verbal cues   Stand to Sit 3  Moderate assistance   Additional items Assist x 2; Increased time required;Verbal cues   Additional Comments performed 1 sit-->stand transfer with mod-ax2, pulling on RW  maintained this stand x 45 seconds and was able to initiate 3 lateral side steps  + knee buckling with R LE  performed 2nd sit-->stand transfer without use of RW so therapist could block R LE to prevent buckling  Patient maintained this stand x 30 seconds and initiated 4 lateral side steps to reposition self higher in bed  Ambulation/Elevation   Gait pattern Excessively slow; Short stride;Decreased foot clearance;R Knee Gibran   Gait Assistance 3  Moderate assist   Additional items Assist x 2   Assistive Device Rolling walker; Other (Comment)  (b/l hha)   Distance 3 steps + 4 steps    Balance   Static Sitting Fair   Static Standing Poor +   Ambulatory Poor   Endurance Deficit   Endurance Deficit Yes   Endurance Deficit Description gross weakness, fatigue Activity Tolerance   Activity Tolerance Patient limited by fatigue   Medical Staff Made Aware CO-TREATMENT WITH OCCUPATIONAL THERAPY WAS PERFORMED TODAY  THIS PATIENT'S PARTICIPATION IN THERAPY SERVICES WAS LIMITED BY DECREASED TOLERANCE FOR ACTIVITY AND CURRENT MEDICAL STATUS  FOR THESE REASONS, CO-TREATMENT WAS PERFORMED SO THAT PATIENT COULD OPTIMALLY PARTICIPATE IN THERAPY SERVICES AND MAXIMIZE THEIR REHABILITATION DURING TREATMENT TIME  PT AND OT DISCIPLINES ADDRESSED SEPARATE GOALS OF PATIENT'S INDIVIDUALIZED CARE PLAN  Nurse Made Aware edward to see per rn Roscoe    Exercises   Quad Sets Supine;10 reps;Bilateral;AROM  (x 3 second  holds)   Glute Sets Supine;10 reps;Bilateral;AROM  (x 3 second hold )   Hip Abduction Supine;10 reps;Bilateral;AROM   Hip Adduction Supine;10 reps;Bilateral;AROM  (x 3 second hold w/ pillow between LE )   Ankle Pumps Supine;Bilateral;AROM;20 reps   Assessment   Prognosis Fair   Problem List Decreased strength;Decreased endurance; Impaired balance;Decreased mobility; Decreased skin integrity;Obesity   Assessment PT initiated treatment session in order to assist patient in achieving goals to improve overall activity tolerance, transfers, and ambulation  Patient agreeable to participation however fatigued which limited mobility  She required mod-Ax2 for all aspects of mobility today and initiated 2 bouts of side-stepping  Steps are limited by LE weakness and R knee buckling  Post treatment session patient was supine in bed and repositioned with wedges to offload buttock  This patient continues to function below her baseline and is recommended for rehab upon d/c  She will benefit from continued skilled PT this admission to achieve maximal function and safety  Goals   Patient Goals agreeable to participating with PT   STG Expiration Date 11/15/21   PT Treatment Day 7   Plan   Treatment/Interventions Functional transfer training;LE strengthening/ROM; Therapeutic exercise; Endurance training;Patient/family training;Equipment eval/education; Bed mobility;Gait training;OT;Spoke to nursing   Progress Progressing toward goals   PT Frequency Other (Comment)  (3-5x/wk)   Recommendation   PT Discharge Recommendation Post acute rehabilitation services   Equipment Recommended Wheelchair;Walker   PT - OK to Discharge Yes  (to rehab when med edward )   Malorie 8 in Bed Without Bedrails 2   Lying on Back to Sitting on Edge of Flat Bed 1   Moving Bed to Chair 1   Standing Up From Chair 1   Walk in Room 1   Climb 3-5 Stairs 1   Basic Mobility Inpatient Raw Score 7   Turning Head Towards Sound 4   Follow Simple Instructions 4   Low Function Basic Mobility Raw Score 15   Low Function Basic Mobility Standardized Score 23 9       The patient's AM-PAC Basic Mobility Inpatient Standardized Score is less than 42 9, suggesting this patient may benefit from discharge to post-acute rehabilitation services  Please also refer to the recommendation of the Physical Therapist for safe discharge planning      NILAM PABON PT, DPT

## 2021-11-08 NOTE — PLAN OF CARE
Problem: MOBILITY - ADULT  Goal: Maintain or return to baseline ADL function  Description: INTERVENTIONS:  -  Assess patient's ability to carry out ADLs; assess patient's baseline for ADL function and identify physical deficits which impact ability to perform ADLs (bathing, care of mouth/teeth, toileting, grooming, dressing, etc )  - Assess/evaluate cause of self-care deficits   - Assess range of motion  - Assess patient's mobility; develop plan if impaired  - Assess patient's need for assistive devices and provide as appropriate  - Encourage maximum independence but intervene and supervise when necessary  - Involve family in performance of ADLs  - Assess for home care needs following discharge   - Consider OT consult to assist with ADL evaluation and planning for discharge  - Provide patient education as appropriate  11/8/2021 0953 by Colten Carrera RN  Outcome: Progressing  11/8/2021 0953 by Colten Carrera RN  Outcome: Progressing  Goal: Maintains/Returns to pre admission functional level  Description: INTERVENTIONS:  - Perform BMAT or MOVE assessment daily    - Set and communicate daily mobility goal to care team and patient/family/caregiver  - Collaborate with rehabilitation services on mobility goals if consulted  - Reposition patient every 2 hours    - Record patient progress and toleration of activity level   11/8/2021 0953 by Colten Carrera RN  Outcome: Progressing  11/8/2021 0953 by Colten Carrera RN  Outcome: Progressing     Problem: Potential for Falls  Goal: Patient will remain free of falls  Description: INTERVENTIONS:  - Educate patient/family on patient safety including physical limitations  - Instruct patient to call for assistance with activity   - Consult OT/PT to assist with strengthening/mobility   - Keep Call bell within reach  - Keep bed low and locked with side rails adjusted as appropriate  - Keep care items and personal belongings within reach  - Initiate and maintain comfort rounds  - Make Fall Risk Sign visible to staff  - Offer Toileting every 2 Hours, in advance of need  - Initiate/Maintain alarm  - Obtain necessary fall risk management equipment:   - Apply yellow socks and bracelet for high fall risk patients  - Consider moving patient to room near nurses station  11/8/2021 0953 by Maite Carpenter RN  Outcome: Progressing  11/8/2021 0953 by Maite Carpenter RN  Outcome: Progressing     Problem: Prexisting or High Potential for Compromised Skin Integrity  Goal: Skin integrity is maintained or improved  Description: INTERVENTIONS:  - Identify patients at risk for skin breakdown  - Assess and monitor skin integrity  - Assess and monitor nutrition and hydration status  - Monitor labs   - Assess for incontinence   - Turn and reposition patient  - Assist with mobility/ambulation  - Relieve pressure over bony prominences  - Avoid friction and shearing  - Provide appropriate hygiene as needed including keeping skin clean and dry  - Evaluate need for skin moisturizer/barrier cream  - Collaborate with interdisciplinary team   - Patient/family teaching  - Consider wound care consult   11/8/2021 0953 by Maite Carpenter RN  Outcome: Progressing  11/8/2021 0953 by Maite Carpenter RN  Outcome: Progressing     Problem: Nutrition/Hydration-ADULT  Goal: Nutrient/Hydration intake appropriate for improving, restoring or maintaining nutritional needs  Description: Monitor and assess patient's nutrition/hydration status for malnutrition  Collaborate with interdisciplinary team and initiate plan and interventions as ordered  Monitor patient's weight and dietary intake as ordered or per policy  Utilize nutrition screening tool and intervene as necessary  Determine patient's food preferences and provide high-protein, high-caloric foods as appropriate       INTERVENTIONS:  - Monitor oral intake, urinary output, labs, and treatment plans  - Assess nutrition and hydration status and recommend course of action  - Evaluate amount of meals eaten  - Assist patient with eating if necessary   - Allow adequate time for meals  - Recommend/ encourage appropriate diets, oral nutritional supplements, and vitamin/mineral supplements  - Order, calculate, and assess calorie counts as needed  - Recommend, monitor, and adjust tube feedings and TPN/PPN based on assessed needs  - Assess need for intravenous fluids  - Provide specific nutrition/hydration education as appropriate  - Include patient/family/caregiver in decisions related to nutrition  11/8/2021 0953 by Nataliia Kauffman RN  Outcome: Progressing  11/8/2021 0953 by Nataliia Kauffman RN  Outcome: Progressing     Problem: METABOLIC, FLUID AND ELECTROLYTES - ADULT  Goal: Electrolytes maintained within normal limits  Description: INTERVENTIONS:  - Monitor labs and assess patient for signs and symptoms of electrolyte imbalances  - Administer electrolyte replacement as ordered  - Monitor response to electrolyte replacements, including repeat lab results as appropriate  - Instruct patient on fluid and nutrition as appropriate  11/8/2021 0953 by Nataliia Kauffman RN  Outcome: Progressing  11/8/2021 0953 by Nataliia Kauffman RN  Outcome: Progressing  Goal: Fluid balance maintained  Description: INTERVENTIONS:  - Monitor labs   - Monitor I/O and WT  - Instruct patient on fluid and nutrition as appropriate  - Assess for signs & symptoms of volume excess or deficit  11/8/2021 0953 by Nataliia Kauffman RN  Outcome: Progressing  11/8/2021 0953 by Nataliia Kauffman RN  Outcome: Progressing

## 2021-11-08 NOTE — PROGRESS NOTES
[facility] PROGRESS NOTE   Pam Guzman 77 y o  female MRN: [id number]  Unit/Bed#: [facility] 929-01 Encounter: [id number]  Reason for Consult: LUANN    ASSESSMENT and PLAN:  1  Acute kidney injury  · Admitted with a creatinine of 3 26 on 10/9/21  · Creatinine worsened to a peak of 6 78 on 10/20/21  · Started HD on 10/20/21 and now deemed ESRD  · Being dialyzed on a TTS schedule  · Next HD is being planned for tomorrow  2  Chronic kidney disease, stage V:  · Baseline creatinine around 3 0 to 3 5  · Follows with Dr Nancy Graham     3  Access:  · Permcath  · Has R arm AVF which was not been cannulated - will have to check if this can be attempted  4  Hypertension:  · Blood pressure is at goal   · Continue metoprolol 25 mg BID  5  Anemia:    · Hemoglobin is below goal but stable at 7 7  · Not on JENELLE due to history of PE     6  Mineral bone disease:  · Continue calcitriol 0 25 mcg daily for 2HPT  · Continue renal diet for hyperphosphatemia  · Phos is at goal without binders  7  Sepsis due to infected left renal hematoma/perinephric abscess with chronic obstructive uropathy  · Repeat CT today indicates improvement  8  Fusobacterium bacteremia: Completed antibiotics  DISPOSITION:  · HD tomorrow  · Awaiting outpatient HD placement - closer to [place] per notes  SUBJECTIVE / 24H INTERVAL HISTORY:  Eating okay  Denies any chest pain or shortness of breath      OBJECTIVE:  Current Weight: Weight - Scale: 82 kg (180 lb 12 4 oz) (11/4: post hemodialysis weight)  Vitals:    11/07/21 0759 11/07/21 1556 11/07/21 2231 11/08/21 0739   BP: 124/71 123/72 116/67 131/75   Pulse: 82 80 77 81   Resp: 17 18 18 20   Temp: 97 9 °F (36 6 °C) 97 9 °F (36 6 °C) 97 6 °F (36 4 °C) 98 2 °F (36 8 °C)   TempSrc:       SpO2: 96% 93% 90% 94%   Weight:       Height:           Intake/Output Summary (Last 24 hours) at 11/8/2021 1107  Last data filed at 11/8/2021 0459  Gross per 24 hour   Intake 480 ml Output 196 ml   Net 284 ml     General: conscious, cooperative, no distress  Skin: dry  Eyes: pink conjunctivae  ENT: dry mucous membranes  Chest/Lungs: equal chest expansion, clear breath sounds  CVS: distinct heart sounds, normal rate, regular rhythm, no rub  Abdomen: soft, non tender, non distended, normal bowel sounds  Extremities: no edema  : no camp catheter  Neuro: awake, alert     Psych: appropriate affect    Medications:    Current Facility-Administered Medications:     acetaminophen (TYLENOL) tablet 650 mg, 650 mg, Oral, Q6H PRN, Portillo Peng MD, 650 mg at 11/03/21 1613    apixaban (ELIQUIS) tablet 2 5 mg, 2 5 mg, Oral, BID, Hi Panda DO, 2 5 mg at 11/08/21 0947    atorvastatin (LIPITOR) tablet 40 mg, 40 mg, Oral, HS, Portillo Peng MD, 40 mg at 11/07/21 2114    calcitriol (ROCALTROL) capsule 0 25 mcg, 0 25 mcg, Oral, Daily, Portillo Peng MD, 0 25 mcg at 11/08/21 0947    cholecalciferol (VITAMIN D3) tablet 800 Units, 800 Units, Oral, Daily, Portillo Peng MD, 800 Units at 11/08/21 0947    citalopram (CeleXA) tablet 20 mg, 20 mg, Oral, Daily, Portillo Peng MD, 20 mg at 11/08/21 0947    levothyroxine tablet 75 mcg, 75 mcg, Oral, Daily, Portillo Peng MD, 75 mcg at 11/08/21 0947    loperamide (IMODIUM) capsule 2 mg, 2 mg, Oral, 4x Daily PRN, CYNTHIA Mtz-C, 2 mg at 11/06/21 1415    melatonin tablet 3 mg, 3 mg, Oral, HS, Portillo Peng MD, 3 mg at 11/07/21 2113    metoprolol (LOPRESSOR) injection 2 5 mg, 2 5 mg, Intravenous, Q6H PRN, Misty Kiran PA-C, 2 5 mg at 10/20/21 1618    metoprolol tartrate (LOPRESSOR) tablet 25 mg, 25 mg, Oral, BID, KEVYN MtzC, 25 mg at 11/08/21 0946    ondansetron (ZOFRAN) injection 4 mg, 4 mg, Intravenous, Q6H PRN, Portillo Peng MD, 4 mg at 10/14/21 2153    pantoprazole (PROTONIX) EC tablet 40 mg, 40 mg, Oral, BID AC, Mumtaz Laguerre DO, 40 mg at 11/08/21 0500    piperacillin-tazobactam (ZOSYN) 2 25 g in sodium chloride 0 9 % 50 mL IVPB, 2 25 g, Intravenous, Q6H, Charline Carlson MD, Last Rate: 100 mL/hr at 11/08/21 0458, 2 25 g at 11/08/21 0458    saccharomyces boulardii (FLORASTOR) capsule 250 mg, 250 mg, Oral, Daily, Eugenio Parson MD, 250 mg at 11/08/21 0946    Laboratory Results:  Results from last 7 days   Lab Units 11/08/21 0458 11/06/21  0708 11/05/21  0540 11/04/21  0531 11/03/21  0542 11/02/21  0520   WBC Thousand/uL 5 11 4 91 4 78 4 71 4 85 4 12*   HEMOGLOBIN g/dL 7 7* 7 6* 7 9* 8 2* 9 0* 8 4*   HEMATOCRIT % 24 7* 24 5* 25 1* 25 6* 27 7* 27 0*   PLATELETS Thousands/uL 177 192 200 213 219 207   POTASSIUM mmol/L 3 7 3 5 3 1* 2 9*  --  3 5   CHLORIDE mmol/L 104 106 101 101  --  103   CO2 mmol/L 28 27 27 28  --  21   BUN mg/dL 19 16 11 17  --  22   CREATININE mg/dL 4 40* 4 29* 2 95* 4 01*  --  4 65*   CALCIUM mg/dL 8 7 8 3 8 3 8 2*  --  8 6   PHOSPHORUS mg/dL  --  2 9  --  3 4  --   --

## 2021-11-08 NOTE — TELEMEDICINE
e-Consult (IPC)  - Interventional Radiology  Haresh De Paz 77 y o  female MRN: [id number]  [facility] Encounter: [id number]    IR has been consulted to evaluate the patient, determine the appropriate procedure, and whether or not a procedure can and should be performed regarding the care of Haresh De Paz  We were consulted by GERRI concerning new CT findings , and to possibly perform a tube check/possible removal if medically appropriate for the patient  IP Consult to IR  Consult performed by: VICTORINA Shafer  Consult ordered by: Corry Jerome MD        11/08/21      Assessment/Recommendation:     77year old female well known to IR for various interventions with CT A/P this morning that demonstrated resolution of her 2 drains that were placed respectively on 10/13 and 11/1  Outputs have also been low  There was a tube check of the left sided collection on 10/27 which showed resolution of the fistula  - removal of 2 left sided drains at bedside with assistance of nurse, Roscoe   - gauze and tape placed over sites, may be removed tomorrow       Total time spent in review of data, discussion with requesting provider and rendering advice was 30 minutes  Patient or appropriate family member was verbally informed by Dr Jennifer Gandhi of this consultative service on their behalf to provide more timely access to specialty care in lieu of an in person consultation  Verbal consent was obtained  Thank you for allowing Interventional Radiology to participate in the care of Haresh De Paz  Please don't hesitate to call or TigerText us with any questions       [address] [place] [address]

## 2021-11-08 NOTE — OCCUPATIONAL THERAPY NOTE
Occupational Therapy Progress Note     Patient Name: Sharon Granda  SZVKA'H Date: 11/8/2021  Problem List  Principal Problem:    Sepsis on admission  Active Problems:    History of pulmonary embolism    Obstructive uropathy    Secondary hyperparathyroidism of renal origin [facility])    Essential hypertension    Polycythemia vera     Diarrhea    Anemia of chronic disease    Acute renal failure superimposed on CKD stage 5    Perinephric abscess    Pleural effusion    Swelling of right upper extremity    Hypothyroidism    Bacteremia          11/08/21 1450   OT Last Visit   OT Visit Date 11/08/21   Note Type   Note Type Treatment   Restrictions/Precautions   Weight Bearing Precautions Per Order No   Other Precautions Multiple lines; Fall Risk  (x2 JOSE ANTONIO)   General   Response to Previous Treatment Patient with no complaints from previous session   Lifestyle   Autonomy PTA, pt reports being I with ADLs/IADLs, fnxl mobility, (+)    Reciprocal Relationships Neighbor   Service to Others Retired   Intrinsic Gratification Watching TV   Pain Assessment   Pain Assessment Tool 0-10   Pain Score No Pain   ADL   Where Assessed Edge of bed   Grooming Assistance 5  Supervision/Setup   Grooming Deficit Brushing hair   LB Dressing Assistance 3  Moderate Assistance   LB Dressing Deficit Don/doff R sock; Don/doff L sock   LB Dressing Comments Pt achieves cross-legged position with A    Bed Mobility   Supine to Sit 3  Moderate assistance   Additional items Assist x 2;HOB elevated; Bedrails   Sit to Supine 3  Moderate assistance   Additional items Assist x 2; Increased time required   Additional Comments Pt maintains EOB sitting position with MIN A to supervision    Transfers   Sit to Stand 3  Moderate assistance   Additional items Assist x 2; Increased time required;Verbal cues   Stand to Sit 3  Moderate assistance   Additional items Assist x 2; Increased time required;Verbal cues   Additional Comments Pt initially performs STS with RW, pt with R knee buckling  2nd STS performed with B/L knee blocking and HHA    Functional Mobility   Functional Mobility 3  Moderate assistance   Additional Comments Ax2 minimal side steps towards [facility]    Cognition   Arousal/Participation Responsive; Cooperative   Attention Within functional limits   Orientation Level Oriented X4   Following Commands Follows one step commands without difficulty   Comments Pt pleasant and cooperative t/o session, flat affect   Activity Tolerance   Activity Tolerance Patient limited by fatigue   Medical Staff Made Aware PT JOHNATHAN Phipps Roscoe    Assessment   Assessment Patient participated in Skilled OT session this date with interventions consisting of ADL re training with the use of correct body mechnaics, Energy Conservation techniques, safety awareness and fall prevention techniques,  therapeutic activities to: increase activity tolerance, increase dynamic sit/ stand balance during functional activity  and increase OOB/ sitting tolerance   Upon arrival patient was found supine in bed  Pt demonstrated the following tasks: MOD A x 2 sup <> sit, STS, fnxl mobility  Pt with RLE knee buckling, requires knee blocking  Pt is able to don socks in cross-legged position with MOD A, S for hair care  Patient continues to be functioning below baseline level, occupational performance remains limited secondary to factors listed above and increased risk for falls and injury  From OT standpoint, recommendation at time of d/c would be STR  Continue current POC extending goals x 14 days  Patient to benefit from continued Occupational Therapy treatment while in the hospital to address deficits as defined above and maximize level of functional independence with ADLs and functional mobility  Pt was left after session with all current needs met  The patient's raw score on the AM-PAC Daily Activity inpatient short form is 15, standardized score is 34 69, less than 39 4   Patients at this level are likely to benefit from discharge to post-acute rehabilitation services  Please refer to the recommendation of the Occupational Therapist for safe discharge planning  Plan   Treatment Interventions ADL retraining;Functional transfer training;UE strengthening/ROM; Endurance training;Cognitive reorientation;Patient/family training;Equipment evaluation/education; Compensatory technique education;Continued evaluation; Energy conservation; Activityengagement   Goal Expiration Date 11/22/21  (extend goals x14 days )   OT Treatment Day 5   OT Frequency 2-3x/wk   Recommendation   OT Discharge Recommendation Post acute rehabilitation services   OT - OK to Discharge Yes  (to rehab when medically stable)   AM-PAC Daily Activity Inpatient   Lower Body Dressing 2   Bathing 2   Toileting 2   Upper Body Dressing 3   Grooming 3   Eating 3   Daily Activity Raw Score 15   Daily Activity Standardized Score (Calc for Raw Score >=11) 34 69   AM-PAC Applied Cognition Inpatient   Following a Speech/Presentation 3   Understanding Ordinary Conversation 4   Taking Medications 4   Remembering Where Things Are Placed or Put Away 3   Remembering List of 4-5 Errands 3   Taking Care of Complicated Tasks 2   Applied Cognition Raw Score 19   Applied Cognition Standardized Score 39 77     Kole Torrez MS, OTR/L

## 2021-11-08 NOTE — PROGRESS NOTES
Progress Note - Infectious Disease   Shyann Camacho 77 y o  female MRN: [id number]  Unit/Bed#: [facility] 929-01 Encounter: [id number]      Impression/Plan:  1  Sepsis, POA   Patient presented with progressing flank/abdominal discomfort likely related to problem 3  Clinical parameters had improved  Overall poor progress likely due to 4  Tachycardia/borderline blood pressures 10/28, antibiotics restarted given 3  Currently stable  Continue antibiotics as below  Recheck CBC with diff and CMP  Supportive care     2  Fusobacterium bacteremia   Source unknown, possibly transient given 1 or occult GI source  CT imaging of the abdomen on admission unremarkable  Repeat imaging with new collections noted in the abdomen and partial SBO, potential source   Drains placed   Patient without any teeth  Repeat blood cultures without growth   Not isolated on fluid cultures  No prior C-scope on chart review   EGD unremarkable  Imaging of the right upper quadrant ultrasound unremarkable   Completed empiric course of Flagyl  No further antibiotics for this issue  GI follow-up  C scope once more stable/outpatient     3  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   Patient has a chronic obstructive uropathy involving the left side and recently had PCN removal   Subsequently developed bleeding at the site and hematoma on imaging likely due to chronic anticoagulation   IR aspiration and drain placement on 10/13 and earlier urine cultures with same organisms   Drains likely provided significant source control   IR drain check noted one tube dislodged   Repeat CT scan with persistent collections, uncertain if these are sterile   Status post repeat aspiration with cultures   With changes in vitals on 10/28, antibiotics restarted   Repeat body fluid cultures negative  Repeat CT scan with resolution of perinephric abscess, and other collection in the gutter    Decreased collection in the cul-de-sac  Continue renally dosed Zosyn through today to complete 7 days post drain placement  Drain management  Supportive care     4  Vergil Else on CKD with fistula  Patient with baseline chronic kidney disease with fistula created in the right upper extremity   Stenosis causing swelling  Kidney function worsened over entire admission   Initiated on dialysis  Nephrology follow-up  Hemodialysis  Vascular surgery follow-up/imaging     5  History of C diff and diarrhea   Patient had a history of C diff in   She has had repeat PCRs in 2021 and now negative despite recent diarrhea  Likely related to 9    Antibiotics as above  Monitor stool output  No C diff prophylaxis for now     6  Polycythemia vera and MDS   Recent drop in hemoglobin and leukopenia likely related  Ongoing management/supportive care as per primary   Recommend follow-up with Oncology as outpatient      7  Prior PE on anticoagulation   Likely risk factor for issues as above  AC as per primary        8  Transaminitis   Patient noted to have slowly increasing alkaline phosphatase and AST   In the setting of this bacteremia consider occult abscess  Now downtrending/stable   Liver/RUQ ultrasound unremarkable  LFTs are much improved  Antibiotics as above  GI follow-up     9  Melena   Possible upper GI bleed   Ongoing follow-up and workup as per GI   EGD reportedly unremarkable   Additional care as per primary      Discussed the above management plan with the primary service    Antibiotics:  Zosyn 12  Post drain 7    Subjective:  Patient has no fever, chills, sweats; no nausea, vomiting, diarrhea; no cough, shortness of breath; no pain  No new symptoms      Objective:  Vitals:  Temp:  [97 6 °F (36 4 °C)-98 2 °F (36 8 °C)] 98 2 °F (36 8 °C)  HR:  [77-81] 81  Resp:  [18-20] 20  BP: (116-131)/(67-75) 131/75  SpO2:  [90 %-94 %] 94 %  Temp (24hrs), Av 9 °F (36 6 °C), Min:97 6 °F (36 4 °C), Max:98 2 °F (36 8 °C)  Current: Temperature: 98 2 °F (36 8 °C)    Physical Exam:   General Appearance:  Alert, interactive, nontoxic, no acute distress  Throat: Oropharynx moist without lesions  Lungs:   Clear to auscultation bilaterally; no wheezes, rhonchi or rales; respirations unlabored   Heart:  RRR; no murmur, rub or gallop   Abdomen:   Soft, non-tender, non-distended, positive bowel sounds  Left-sided drains in place  Extremities: No clubbing, cyanosis or edema   Skin: No new rashes or lesions  No draining wounds noted  Labs, Imaging, & Other studies:   All pertinent labs and imaging studies were personally reviewed  Results from last 7 days   Lab Units 11/08/21 0458 11/06/21  0708 11/05/21  0540   WBC Thousand/uL 5 11 4 91 4 78   HEMOGLOBIN g/dL 7 7* 7 6* 7 9*   PLATELETS Thousands/uL 177 192 200     Results from last 7 days   Lab Units 11/08/21 0458 11/06/21 0708 11/06/21  0708 11/05/21  0540 11/05/21  0540   SODIUM mmol/L 135*  --  139  --  136   POTASSIUM mmol/L 3 7   < > 3 5   < > 3 1*   CHLORIDE mmol/L 104   < > 106   < > 101   CO2 mmol/L 28   < > 27   < > 27   BUN mg/dL 19   < > 16   < > 11   CREATININE mg/dL 4 40*   < > 4 29*   < > 2 95*   EGFR ml/min/1 73sq m 9   < > 9   < > 15   CALCIUM mg/dL 8 7   < > 8 3   < > 8 3   AST U/L  --   --   --   --  25   ALT U/L  --   --   --   --  7*   ALK PHOS U/L  --   --   --   --  200*    < > = values in this interval not displayed  Results from last 7 days   Lab Units 11/01/21 2004   GRAM STAIN RESULT  2+ Polys  No bacteria seen   BODY FLUID CULTURE, STERILE  No growth        CT abdomen pelvis-resolution of left perinephric abscess  Complete resolution of the abscess along the anterior margin the peritoneal wall in the left pericolic gutter    Decreased size of the loculated collection posterior cul-de-sac    Personally reviewed the images in PACS

## 2021-11-08 NOTE — PLAN OF CARE
Problem: OCCUPATIONAL THERAPY ADULT  Goal: Performs self-care activities at highest level of function for planned discharge setting  See evaluation for individualized goals  Description: Treatment Interventions: ADL retraining, Functional transfer training, UE strengthening/ROM, Endurance training, Patient/family training, Equipment evaluation/education, Compensatory technique education, Continued evaluation, Activityengagement, Energy conservation          See flowsheet documentation for full assessment, interventions and recommendations  Outcome: Progressing  Note: Limitation: Decreased ADL status,Decreased UE ROM,Decreased UE strength,Decreased endurance,Decreased self-care trans,Decreased high-level ADLs  Prognosis: Fair  Assessment: Patient participated in Skilled OT session this date with interventions consisting of ADL re training with the use of correct body mechnaics, Energy Conservation techniques, safety awareness and fall prevention techniques,  therapeutic activities to: increase activity tolerance, increase dynamic sit/ stand balance during functional activity  and increase OOB/ sitting tolerance   Upon arrival patient was found supine in bed  Pt demonstrated the following tasks: MOD A x 2 sup <> sit, STS, fnxl mobility  Pt with RLE knee buckling, requires knee blocking  Pt is able to don socks in cross-legged position with MOD A, S for hair care  Patient continues to be functioning below baseline level, occupational performance remains limited secondary to factors listed above and increased risk for falls and injury  From OT standpoint, recommendation at time of d/c would be STR  Continue current POC extending goals x 14 days  Patient to benefit from continued Occupational Therapy treatment while in the hospital to address deficits as defined above and maximize level of functional independence with ADLs and functional mobility  Pt was left after session with all current needs met   The patient's raw score on the AM-PAC Daily Activity inpatient short form is 15, standardized score is 34 69, less than 39 4  Patients at this level are likely to benefit from discharge to post-acute rehabilitation services  Please refer to the recommendation of the Occupational Therapist for safe discharge planning       OT Discharge Recommendation: Post acute rehabilitation services  OT - OK to Discharge: Yes (to rehab when medically stable)

## 2021-11-09 ENCOUNTER — APPOINTMENT (INPATIENT)
Dept: DIALYSIS | Facility: HOSPITAL | Age: 76
DRG: 981 | End: 2021-11-09
Payer: COMMERCIAL

## 2021-11-09 LAB
ANION GAP SERPL CALCULATED.3IONS-SCNC: 8 MMOL/L (ref 4–13)
BUN SERPL-MCNC: 26 MG/DL (ref 5–25)
CALCIUM SERPL-MCNC: 8.6 MG/DL (ref 8.3–10.1)
CHLORIDE SERPL-SCNC: 105 MMOL/L (ref 100–108)
CO2 SERPL-SCNC: 25 MMOL/L (ref 21–32)
CREAT SERPL-MCNC: 5.23 MG/DL (ref 0.6–1.3)
ERYTHROCYTE [DISTWIDTH] IN BLOOD BY AUTOMATED COUNT: 17.7 % (ref 11.6–15.1)
GFR SERPL CREATININE-BSD FRML MDRD: 7 ML/MIN/1.73SQ M
GLUCOSE SERPL-MCNC: 87 MG/DL (ref 65–140)
HCT VFR BLD AUTO: 25.4 % (ref 34.8–46.1)
HGB BLD-MCNC: 7.8 G/DL (ref 11.5–15.4)
MCH RBC QN AUTO: 29.1 PG (ref 26.8–34.3)
MCHC RBC AUTO-ENTMCNC: 30.7 G/DL (ref 31.4–37.4)
MCV RBC AUTO: 95 FL (ref 82–98)
PLATELET # BLD AUTO: 193 THOUSANDS/UL (ref 149–390)
PMV BLD AUTO: 10.2 FL (ref 8.9–12.7)
POTASSIUM SERPL-SCNC: 3.4 MMOL/L (ref 3.5–5.3)
RBC # BLD AUTO: 2.68 MILLION/UL (ref 3.81–5.12)
SODIUM SERPL-SCNC: 138 MMOL/L (ref 136–145)
WBC # BLD AUTO: 4.95 THOUSAND/UL (ref 4.31–10.16)

## 2021-11-09 PROCEDURE — 92526 ORAL FUNCTION THERAPY: CPT

## 2021-11-09 PROCEDURE — 99232 SBSQ HOSP IP/OBS MODERATE 35: CPT | Performed by: INTERNAL MEDICINE

## 2021-11-09 PROCEDURE — 80048 BASIC METABOLIC PNL TOTAL CA: CPT | Performed by: INTERNAL MEDICINE

## 2021-11-09 PROCEDURE — 90935 HEMODIALYSIS ONE EVALUATION: CPT | Performed by: INTERNAL MEDICINE

## 2021-11-09 PROCEDURE — 85027 COMPLETE CBC AUTOMATED: CPT | Performed by: INTERNAL MEDICINE

## 2021-11-09 RX ADMIN — LEVOTHYROXINE SODIUM 75 MCG: 75 TABLET ORAL at 12:55

## 2021-11-09 RX ADMIN — Medication 250 MG: at 12:55

## 2021-11-09 RX ADMIN — APIXABAN 2.5 MG: 2.5 TABLET, FILM COATED ORAL at 21:08

## 2021-11-09 RX ADMIN — PIPERACILLIN AND TAZOBACTAM 2.25 G: 2; .25 INJECTION, POWDER, FOR SOLUTION INTRAVENOUS at 10:33

## 2021-11-09 RX ADMIN — PANTOPRAZOLE SODIUM 40 MG: 40 TABLET, DELAYED RELEASE ORAL at 05:30

## 2021-11-09 RX ADMIN — METOPROLOL TARTRATE 25 MG: 25 TABLET, FILM COATED ORAL at 21:08

## 2021-11-09 RX ADMIN — PANTOPRAZOLE SODIUM 40 MG: 40 TABLET, DELAYED RELEASE ORAL at 18:08

## 2021-11-09 RX ADMIN — CITALOPRAM HYDROBROMIDE 20 MG: 20 TABLET ORAL at 12:55

## 2021-11-09 RX ADMIN — PIPERACILLIN AND TAZOBACTAM 2.25 G: 2; .25 INJECTION, POWDER, FOR SOLUTION INTRAVENOUS at 05:30

## 2021-11-09 RX ADMIN — MELATONIN TAB 3 MG 3 MG: 3 TAB at 21:08

## 2021-11-09 RX ADMIN — ATORVASTATIN CALCIUM 40 MG: 40 TABLET, FILM COATED ORAL at 21:08

## 2021-11-09 RX ADMIN — CALCITRIOL 0.25 MCG: 0.25 CAPSULE, LIQUID FILLED ORAL at 12:57

## 2021-11-09 RX ADMIN — LOPERAMIDE HYDROCHLORIDE 2 MG: 2 CAPSULE ORAL at 12:55

## 2021-11-09 RX ADMIN — CHOLECALCIFEROL TAB 10 MCG (400 UNIT) 800 UNITS: 10 TAB at 12:57

## 2021-11-09 RX ADMIN — APIXABAN 2.5 MG: 2.5 TABLET, FILM COATED ORAL at 12:55

## 2021-11-09 NOTE — ASSESSMENT & PLAN NOTE
· Blood cultures from 10/9 growing Fusobacterium - discontinued IV Zosyn regimen today  · Repeat blood cultures 10/12 are negative  · Appreciate Infectious Disease evaluation

## 2021-11-09 NOTE — PROGRESS NOTES
Progress Note - Infectious Disease   Abilio Faria 77 y o  female MRN: [id number]  Unit/Bed#: [facility] 929-01 Encounter: [id number]      Impression/Plan:  1  Sepsis, POA   Patient presented with progressing flank/abdominal discomfort likely related to problem 3  Clinical parameters had improved  Overall poor progress likely due to 4  Tachycardia/borderline blood pressures 10/28, antibiotics restarted given 3  Currently stable  Patient has completed almost 2 weeks of intravenous Zosyn with more than a week completed since the time of the drainage  Discontinue antibiotics  Monitor off all antibiotics  Recheck CBC with diff and CMP  Supportive care     2  Fusobacterium bacteremia   Source unknown, possibly transient given 1 or occult GI source  CT imaging of the abdomen on admission unremarkable  Repeat imaging with new collections noted in the abdomen and partial SBO, potential source   Drains placed   Patient without any teeth  Repeat blood cultures without growth   Not isolated on fluid cultures  No prior C-scope on chart review   EGD unremarkable  Imaging of the right upper quadrant ultrasound unremarkable   Completed empiric course of Flagyl  No further antibiotics for this issue  GI follow-up  C scope once more stable/outpatient     3  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   Patient has a chronic obstructive uropathy involving the left side and recently had PCN removal   Subsequently developed bleeding at the site and hematoma on imaging likely due to chronic anticoagulation   IR aspiration and drain placement on 10/13 and earlier urine cultures with same organisms   Drains likely provided significant source control   IR drain check noted one tube dislodged   Repeat CT scan with persistent collections, uncertain if these are sterile   Status post repeat aspiration with cultures   With changes in vitals on 10/28, antibiotics restarted   Repeat body fluid cultures negative    Repeat CT scan with resolution of perinephric abscess, and other collection in the gutter  Decreased collection in the cul-de-sac  Discontinue Zosyn  Monitor off all antibiotics  Drain management  Supportive care     4  Angela Cook on CKD with fistula  Patient with baseline chronic kidney disease with fistula created in the right upper extremity   Stenosis causing swelling  Kidney function worsened over entire admission   Initiated on dialysis  Nephrology follow-up  Hemodialysis  Vascular surgery follow-up/imaging     5  History of C diff and diarrhea   Patient had a history of C diff in 2019  She has had repeat PCRs in July 2021 and now negative despite recent diarrhea  Likely related to 9 and 2 ongoing antibiotic use  Discontinue antibiotics as above  Monitor stool output  No C diff prophylaxis for now     6  Polycythemia vera and MDS   Recent drop in hemoglobin and leukopenia likely related  Ongoing management/supportive care as per primary   Recommend follow-up with Oncology as outpatient      7  Prior PE on anticoagulation   Likely risk factor for issues as above  AC as per primary        8  Transaminitis   Patient noted to have slowly increasing alkaline phosphatase and AST   In the setting of this bacteremia consider occult abscess  Now downtrending/stable   Liver/RUQ ultrasound unremarkable   LFTs are much improved  Antibiotics as above  GI follow-up     9  Melena   Possible upper GI bleed   Ongoing follow-up and workup as per GI   EGD reportedly unremarkable   Additional care as per primary      Discussed the above management plan with the primary service    Antibiotics:  Zosyn 13  Post drain 8    Subjective:  Patient has no fever, chills, sweats; no nausea, vomiting, 2 loose stools per day; no cough, shortness of breath; no increased pain  No new symptoms  She feels a bit better overall      Objective:  Vitals:  Temp:  [97 4 °F (36 3 °C)-98 1 °F (36 7 °C)] 98 1 °F (36 7 °C)  HR:  [76-87] 82  Resp:  [16-18] 16  BP: (114-130)/(65-78) 117/67  SpO2:  [92 %] 92 %  Temp (24hrs), Av 7 °F (36 5 °C), Min:97 4 °F (36 3 °C), Max:98 1 °F (36 7 °C)  Current: Temperature: 98 1 °F (36 7 °C)    Physical Exam:   General Appearance:  Alert, interactive, nontoxic, no acute distress  Throat: Oropharynx moist without lesions  Lungs:   Decreased breath sounds bilaterally; no wheezes, rhonchi or rales; respirations unlabored   Heart:  RRR; no murmur, rub or gallop   Abdomen:   Soft, non-tender, non-distended, positive bowel sounds  Extremities: No clubbing, cyanosis or edema   Skin: No new rashes or lesions  No draining wounds noted  Labs, Imaging, & Other studies:   All pertinent labs and imaging studies were personally reviewed  Results from last 7 days   Lab Units 21  0531 21  0458 21  0708   WBC Thousand/uL 4 95 5 11 4 91   HEMOGLOBIN g/dL 7 8* 7 7* 7 6*   PLATELETS Thousands/uL 193 177 192     Results from last 7 days   Lab Units 21  0531 21  0458 21  0458 21  0708 21  0708 21  0540 21  0540   SODIUM mmol/L 138  --  135*  --  139   < > 136   POTASSIUM mmol/L 3 4*   < > 3 7   < > 3 5   < > 3 1*   CHLORIDE mmol/L 105   < > 104   < > 106   < > 101   CO2 mmol/L 25   < > 28   < > 27   < > 27   BUN mg/dL 26*   < > 19   < > 16   < > 11   CREATININE mg/dL 5 23*   < > 4 40*   < > 4 29*   < > 2 95*   EGFR ml/min/1 73sq m 7   < > 9   < > 9   < > 15   CALCIUM mg/dL 8 6   < > 8 7   < > 8 3   < > 8 3   AST U/L  --   --   --   --   --   --  25   ALT U/L  --   --   --   --   --   --  7*   ALK PHOS U/L  --   --   --   --   --   --  200*    < > = values in this interval not displayed

## 2021-11-09 NOTE — PLAN OF CARE
Problem: MOBILITY - ADULT  Goal: Maintain or return to baseline ADL function  Description: INTERVENTIONS:  -  Assess patient's ability to carry out ADLs; assess patient's baseline for ADL function and identify physical deficits which impact ability to perform ADLs (bathing, care of mouth/teeth, toileting, grooming, dressing, etc )  - Assess/evaluate cause of self-care deficits   - Assess range of motion  - Assess patient's mobility; develop plan if impaired  - Assess patient's need for assistive devices and provide as appropriate  - Encourage maximum independence but intervene and supervise when necessary  - Involve family in performance of ADLs  - Assess for home care needs following discharge   - Consider OT consult to assist with ADL evaluation and planning for discharge  - Provide patient education as appropriate  Outcome: Progressing  Goal: Maintains/Returns to pre admission functional level  Description: INTERVENTIONS:  - Perform BMAT or MOVE assessment daily    - Set and communicate daily mobility goal to care team and patient/family/caregiver  - Collaborate with rehabilitation services on mobility goals if consulted  - Reposition patient every 2 hours    - Dangle patient 2 times a day  - Record patient progress and toleration of activity level   Outcome: Progressing     Problem: Potential for Falls  Goal: Patient will remain free of falls  Description: INTERVENTIONS:  - Educate patient/family on patient safety including physical limitations  - Instruct patient to call for assistance with activity   - Consult OT/PT to assist with strengthening/mobility   - Keep Call bell within reach  - Keep bed low and locked with side rails adjusted as appropriate  - Keep care items and personal belongings within reach  - Initiate and maintain comfort rounds  - Make Fall Risk Sign visible to staff  - Offer Toileting every 2 Hours, in advance of need  - Initiate/Maintain alarm  - Obtain necessary fall risk management equipment:   - Apply yellow socks and bracelet for high fall risk patients  - Consider moving patient to room near nurses station  Outcome: Progressing     Problem: Prexisting or High Potential for Compromised Skin Integrity  Goal: Skin integrity is maintained or improved  Description: INTERVENTIONS:  - Identify patients at risk for skin breakdown  - Assess and monitor skin integrity  - Assess and monitor nutrition and hydration status  - Monitor labs   - Assess for incontinence   - Turn and reposition patient  - Assist with mobility/ambulation  - Relieve pressure over bony prominences  - Avoid friction and shearing  - Provide appropriate hygiene as needed including keeping skin clean and dry  - Evaluate need for skin moisturizer/barrier cream  - Collaborate with interdisciplinary team   - Patient/family teaching  - Consider wound care consult   Outcome: Progressing     Problem: Nutrition/Hydration-ADULT  Goal: Nutrient/Hydration intake appropriate for improving, restoring or maintaining nutritional needs  Description: Monitor and assess patient's nutrition/hydration status for malnutrition  Collaborate with interdisciplinary team and initiate plan and interventions as ordered  Monitor patient's weight and dietary intake as ordered or per policy  Utilize nutrition screening tool and intervene as necessary  Determine patient's food preferences and provide high-protein, high-caloric foods as appropriate       INTERVENTIONS:  - Monitor oral intake, urinary output, labs, and treatment plans  - Assess nutrition and hydration status and recommend course of action  - Evaluate amount of meals eaten  - Assist patient with eating if necessary   - Allow adequate time for meals  - Recommend/ encourage appropriate diets, oral nutritional supplements, and vitamin/mineral supplements  - Order, calculate, and assess calorie counts as needed  - Recommend, monitor, and adjust tube feedings and TPN/PPN based on assessed needs  - Assess need for intravenous fluids  - Provide specific nutrition/hydration education as appropriate  - Include patient/family/caregiver in decisions related to nutrition  Outcome: Progressing     Problem: METABOLIC, FLUID AND ELECTROLYTES - ADULT  Goal: Electrolytes maintained within normal limits  Description: INTERVENTIONS:  - Monitor labs and assess patient for signs and symptoms of electrolyte imbalances  - Administer electrolyte replacement as ordered  - Monitor response to electrolyte replacements, including repeat lab results as appropriate  - Instruct patient on fluid and nutrition as appropriate  Outcome: Progressing  Goal: Fluid balance maintained  Description: INTERVENTIONS:  - Monitor labs   - Monitor I/O and WT  - Instruct patient on fluid and nutrition as appropriate  - Assess for signs & symptoms of volume excess or deficit  Outcome: Progressing

## 2021-11-09 NOTE — PROGRESS NOTES
[facility]  Progress Note - Amos Fernandez [birth year], 77 y o  female MRN: [id number]  Unit/Bed#: [facility] 929-01 Encounter: [id number]  Primary Care Provider: Susan Lazaro MD   Date and time admitted to hospital: 10/9/2021  2:19 PM      * Sepsis on admission  Assessment & Plan  · Previously with fever coupled with tachycardia and elevated procalcitonin  · Likely secondary to infected left renal hematoma/perinephric abscess -> Fusobacterium bacteremia noted - fluid culture from 10/13 s/p IR-guided drainage growing Enterobacter/Enterococcus -> see antibiotic regimen below    Perinephric abscess  Assessment & Plan  · Presented with left renal hematoma  Pigtail catheter is noted medial to kidney  Renal pelvis may be collapsed  Hemorrhage and thickening extending in the combined interfascial place of retrospective as well as some high density fluid within  · S/p IR drainage 10/12 with JOSE ANTONIO drain placement x 2  · S/p repeat IR drainage 11/1 w/ negative cultures  · Repeat CT imaging on 11/8 revealed resolution perinephric abscess and improvement/resolution of various other noted abscesses on report - s/p removal of JOSE ANTONIO drains - discussed w/ Infectious Disease today - antibiotic regimen discontinued    Acute renal failure superimposed on CKD stage 5  Assessment & Plan  · Transitioned to ESRD now dialysis dependent - Nephrology following  · See plan for associated perinephric abscess and underlying obstructive uropathy below    Bacteremia  Assessment & Plan  · Blood cultures from 10/9 growing Fusobacterium - discontinued IV Zosyn regimen today  · Repeat blood cultures 10/12 are negative  · Appreciate Infectious Disease evaluation    Swelling of right upper extremity  Assessment & Plan  · Underwent repeat RUE vascular doppler study noting "a narrowing in the subclavian vein at the clavicle created elevated PSV and turbulent flow   The dialysis AVF appears to be matured with adequate diameter, flow volumes and less than 6mm in depth throughout the arm  Antegrade, high resistant blunted flow noted in the peripheral arteries  No evidence of outflow obstruction  No evidence of a pseudoaneurysm  No evidence of a large venous branch "  · 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  · S/p fistulagram  · Improved with hemodialysis  · IR recommending compression w/ ACE bandage - no need for repeat fistulogram  · Appreciate PT/OT input w/ recommendation of skilled rehab - awaiting placement    Obstructive uropathy  Assessment & Plan  · Chronic bilateral hydronephrosis w/ recent removal of stents  · Followed by urology and nephrology     Essential hypertension  Assessment & Plan  · Continue Lopressor    History of pulmonary embolism  Assessment & Plan  · Continue Eliquis    Hypothyroidism  Assessment & Plan  · C/w Synthroid     Anemia of chronic disease  Assessment & Plan  · Monitor H/H  · S/p PRBC transfusion on 10/29 - continue to transfuse if necessary    Polycythemia vera   Assessment & Plan  · With associated history of MDS  · Follows outpatient hematology  · Currently off a Jakafi regimen for polycythemia vera - undergoes Aranesp on every four weeks  · Monitor CBC    Diarrhea  Assessment & Plan  · Chronic issue  · PRN Imodium on board      DVT Prophylaxis:  Eliquis       Patient Centered Rounds:  I have performed bedside rounds and discussed plan of care with nursing today  Discussions with Specialists or Other Care Team Provider:  see above assessments if applicable    Education and Discussions with Family / Patient:  Patient at bedside    Time Spent for Care:  32 minutes  More than 50% of total time spent on counseling and coordination of care as described above      Current Length of Stay: 31 day(s)    Current Patient Status: Inpatient   Certification Statement:  Patient will continue to require additional hospital stay due to assessments as noted above     Code Status: Level 1 - Full Code        Subjective:     No new complaints this time  Remains weak/fatigued  Denies pain or fever/chills currently  Has a relatively flat affect  Objective:     Vitals:   Temp (24hrs), Av 8 °F (36 6 °C), Min:97 4 °F (36 3 °C), Max:98 1 °F (36 7 °C)    Temp:  [97 4 °F (36 3 °C)-98 1 °F (36 7 °C)] 97 9 °F (36 6 °C)  HR:  [76-87] 80  Resp:  [16-18] 18  BP: (100-130)/(63-78) 115/65  SpO2:  [92 %-93 %] 93 %  Body mass index is 35 31 kg/m²  Input and Output Summary (last 24 hours): Intake/Output Summary (Last 24 hours) at 2021 1828  Last data filed at 2021 1307  Gross per 24 hour   Intake 900 ml   Output 1463 ml   Net -563 ml       Physical Exam:     GENERAL:  Weak/fatigued  HEAD:  Normocephalic - atraumatic  EYES: PERRL - EOMI   MOUTH:  Mucosa moist  NECK:  Supple - full range of motion  CARDIAC:  Rate controlled - S1/S2 positive  PULMONARY:  Mildly diminished at the bases - nonlabored respirations  ABDOMEN:  Soft - nontender/nondistended - active bowel sounds  MUSCULOSKELETAL:  Motor strength/range of motion deconditioned - right forearm wrapped - LE edema noted  NEUROLOGIC:  Cognitive impairment noted  SKIN:  Chronic wrinkles/blemishes   PSYCHIATRIC:  Mood/affect flat      Additional Data:     Labs & Recent Cultures:    Results from last 7 days   Lab Units 21  0531 21  0458 21  0458 21  0708 21  0540   WBC Thousand/uL 4 95   < > 5 11   < > 4 78   HEMOGLOBIN g/dL 7 8*   < > 7 7*   < > 7 9*   HEMATOCRIT % 25 4*   < > 24 7*   < > 25 1*   PLATELETS Thousands/uL 193   < > 177   < > 200   BANDS PCT %  --   --  3  --   --    NEUTROS PCT %  --   --   --   --  73   LYMPHS PCT %  --   --   --   --  13*   LYMPHO PCT %  --   --  11*  --   --    MONOS PCT %  --   --   --   --  10   MONO PCT %  --   --  3*  --   --    EOS PCT %  --   --  2  --  2    < > = values in this interval not displayed       Results from last 7 days   Lab Units 11/09/21  0531 11/06/21  0708 11/05/21  0540   POTASSIUM mmol/L 3 4*   < > 3 1*   CHLORIDE mmol/L 105   < > 101   CO2 mmol/L 25   < > 27   BUN mg/dL 26*   < > 11   CREATININE mg/dL 5 23*   < > 2 95*   CALCIUM mg/dL 8 6   < > 8 3   ALK PHOS U/L  --   --  200*   ALT U/L  --   --  7*   AST U/L  --   --  25    < > = values in this interval not displayed  Last 24 Hours Medication List:   Current Facility-Administered Medications   Medication Dose Route Frequency Provider Last Rate    acetaminophen  650 mg Oral Q6H PRN Elizabeth Kwok MD      apixaban  2 5 mg Oral BID Eligha Sanchez, DO      atorvastatin  40 mg Oral HS Elizabeth Kwok MD      calcitriol  0 25 mcg Oral Daily Elizabeth Kwok MD      cholecalciferol  800 Units Oral Daily Elizabeth Kwok MD      citalopram  20 mg Oral Daily Elizabeth Kwok MD      levothyroxine  75 mcg Oral Daily Elizabeth Kwok MD      loperamide  2 mg Oral 4x Daily PRN Janine Ritchie PA-C      melatonin  3 mg Oral HS Elizabeth Kwok MD      metoprolol  2 5 mg Intravenous Q6H PRN Janine Ritchie PA-C      metoprolol tartrate  25 mg Oral BID Janine Ritchie PA-C      ondansetron  4 mg Intravenous Q6H PRN Elizabeth Kwok MD      pantoprazole  40 mg Oral BID AC Mumtaz Banai, DO      saccharomyces boulardii  250 mg Oral Daily Elizabeth Kwok MD                      ** Please Note: This note is constructed using a voice recognition dictation system  An occasional wrong word/phrase or sound-a-like substitution may have been picked up by dictation device due to the inherent limitations of voice recognition software  Read the chart carefully and recognize, using reasonable context, where substitutions may have occurred  **

## 2021-11-09 NOTE — ASSESSMENT & PLAN NOTE
· Underwent repeat RUE vascular doppler study noting "a narrowing in the subclavian vein at the clavicle created elevated PSV and turbulent flow  The dialysis AVF appears to be matured with adequate diameter, flow volumes and less than 6mm in depth throughout the arm  Antegrade, high resistant blunted flow noted in the peripheral arteries  No evidence of outflow obstruction  No evidence of a pseudoaneurysm    No evidence of a large venous branch "  · 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  · S/p fistulagram  · Improved with hemodialysis  · IR recommending compression w/ ACE bandage - no need for repeat fistulogram  · Appreciate PT/OT input w/ recommendation of skilled rehab - awaiting placement

## 2021-11-09 NOTE — ASSESSMENT & PLAN NOTE
· Chronic bilateral hydronephrosis w/ recent removal of stents  · Followed by urology and nephrology

## 2021-11-09 NOTE — PROGRESS NOTES
[facility] PROGRESS NOTE   Abilio Faria 77 y o  female MRN: [id number]  [facility] Encounter: [id number]  Reason for Consult: LUANN    ASSESSMENT and PLAN:  1  ESRD on HD (new start this admission)  · Admitted with a creatinine of 3 26 on 10/9/21  · Creatinine worsened to a peak of 6 78 on 10/20/21 and started HD on 10/20/21  · Now deemed ESRD and being dialyzed on a TTS schedule  · HD today  · Will need outpatient HD placement  2  Chronic kidney disease, stage V:  · Baseline creatinine around 3 0 to 3 5  · Follows with Dr Jacquelyn Haddad     3  Access:  · L IJ permcath - placed on 10/20/21  · R arm AVF cannulated today with 15G needles  · Will continue using R arm AVF  · Plan to remove permcath if R arm AVF use is stable x 2 weeks  4  Hypertension:  · Blood pressure is at goal   · Continue metoprolol 25 mg BID  · Appears euvolemic  1 kg UF today  5  Anemia:    · Hemoglobin is below goal but stable  · Not on JENELLE due to history of PE     6  Mineral bone disease:  · Continue calcitriol 0 25 mcg daily for 2HPT  · Continue renal diet for hyperphosphatemia  · Phos is at goal without binders  7  Sepsis due to infected left renal hematoma/perinephric abscess with chronic obstructive uropathy  · Repeat CT 11/8/21 - indicates improvement  8  Fusobacterium bacteremia: Completed antibiotics  DISPOSITION:  · HD today  · Needs outpatient HD placement  · Using R arm AVF today  SUBJECTIVE / 24H INTERVAL HISTORY:  No new acute issues  Denies CP or SOB  HEMODIALYSIS PROCEDURE NOTE  The patient was seen and examined on hemodialysis  Time: 3 hours  Sodium: 138 Blood flow: 350   Dialyzer: F160 Potassium: 4 Dialysate flow: 600   Access: R arm AVF Bicarbonate: 35 Ultrafiltration goal: 1 kg   Medications on HD: Zosyn  R arm AVF cannulated today with 15G needles       OBJECTIVE:  Current Weight: Weight - Scale: 82 kg (180 lb 12 4 oz) (11/4: post hemodialysis weight)  Vitals:    11/09/21 0808 11/09/21 0830 11/09/21 0900 11/09/21 0930   BP: 120/76 116/78 114/65 116/65   BP Location: Left arm Left arm     Pulse: 87 82 82 86   Resp: 16 16 16 16   Temp: 98 1 °F (36 7 °C)      TempSrc: Oral      SpO2:       Weight:       Height:           Intake/Output Summary (Last 24 hours) at 11/9/2021 0951  Last data filed at 11/9/2021 0845  Gross per 24 hour   Intake 460 ml   Output 40 ml   Net 420 ml   General: conscious, cooperative, no distress  Skin: dry  Eyes: pink conjunctivae  ENT: dry mucous membranes  Chest/Lungs: equal chest expansion, clear breath sounds  CVS: distinct heart sounds, normal rate, regular rhythm, no rub  Abdomen: soft, non tender, non distended, normal bowel sounds  Extremities: no edema  : no camp catheter  Neuro: awake, alert     Psych: appropriate affect    Medications:    Current Facility-Administered Medications:     acetaminophen (TYLENOL) tablet 650 mg, 650 mg, Oral, Q6H PRN, Elizabeth Kwok MD, 650 mg at 11/03/21 1613    apixaban (ELIQUIS) tablet 2 5 mg, 2 5 mg, Oral, BID, Bill Sanchez DO, 2 5 mg at 11/08/21 1724    atorvastatin (LIPITOR) tablet 40 mg, 40 mg, Oral, HS, Elizabeth Kwok MD, 40 mg at 11/08/21 2138    calcitriol (ROCALTROL) capsule 0 25 mcg, 0 25 mcg, Oral, Daily, Elizabeth Kwok MD, 0 25 mcg at 11/08/21 0947    cholecalciferol (VITAMIN D3) tablet 800 Units, 800 Units, Oral, Daily, Elizabeth Kwok MD, 800 Units at 11/08/21 0947    citalopram (CeleXA) tablet 20 mg, 20 mg, Oral, Daily, Elizabeth Kwok MD, 20 mg at 11/08/21 0947    levothyroxine tablet 75 mcg, 75 mcg, Oral, Daily, Elizabeth Kwok MD, 75 mcg at 11/08/21 0947    loperamide (IMODIUM) capsule 2 mg, 2 mg, Oral, 4x Daily PRN, Janine Ritchie PA-C, 2 mg at 11/08/21 2138    melatonin tablet 3 mg, 3 mg, Oral, HS, Elizabeth Kwok MD, 3 mg at 11/08/21 2138    metoprolol (LOPRESSOR) injection 2 5 mg, 2 5 mg, Intravenous, Q6H PRN, Janine Ritchie PA-C, 2 5 mg at 10/20/21 1618    metoprolol tartrate (LOPRESSOR) tablet 25 mg, 25 mg, Oral, BID, Elijah Funes PA-C, 25 mg at 11/08/21 1724    ondansetron (ZOFRAN) injection 4 mg, 4 mg, Intravenous, Q6H PRN, Clarence Ochoa MD, 4 mg at 10/14/21 2153    pantoprazole (PROTONIX) EC tablet 40 mg, 40 mg, Oral, BID AC, Mumtaz Laguerre, DO, 40 mg at 11/09/21 0530    piperacillin-tazobactam (ZOSYN) 2 25 g in sodium chloride 0 9 % 50 mL IVPB, 2 25 g, Intravenous, Q6H, Jenna Luke MD, Last Rate: 100 mL/hr at 11/09/21 0530, 2 25 g at 11/09/21 0530    saccharomyces boulardii (FLORASTOR) capsule 250 mg, 250 mg, Oral, Daily, Clarence Ochoa MD, 250 mg at 11/08/21 0946    Laboratory Results:  Results from last 7 days   Lab Units 11/09/21  0531 11/08/21  0458 11/06/21  0708 11/05/21  0540 11/04/21  0531 11/03/21  0542   WBC Thousand/uL 4 95 5 11 4 91 4 78 4 71 4 85   HEMOGLOBIN g/dL 7 8* 7 7* 7 6* 7 9* 8 2* 9 0*   HEMATOCRIT % 25 4* 24 7* 24 5* 25 1* 25 6* 27 7*   PLATELETS Thousands/uL 193 177 192 200 213 219   POTASSIUM mmol/L 3 4* 3 7 3 5 3 1* 2 9*  --    CHLORIDE mmol/L 105 104 106 101 101  --    CO2 mmol/L 25 28 27 27 28  --    BUN mg/dL 26* 19 16 11 17  --    CREATININE mg/dL 5 23* 4 40* 4 29* 2 95* 4 01*  --    CALCIUM mg/dL 8 6 8 7 8 3 8 3 8 2*  --    PHOSPHORUS mg/dL  --   --  2 9  --  3 4  --

## 2021-11-09 NOTE — PLAN OF CARE
Post-Dialysis RN Treatment Note    Blood Pressure:  Pre 120/76 mm/Hg  Post 100/63 mmHg   EDW  TBD kg    Weight:  Pre 81 2 kg   Post 80 1 kg   Mode of weight measurement: Bed Scale   Volume Removed  963 ml    Treatment duration 180 minutes    NS given  no   Treatment shortened?  No   Medications given during Rx Zosyn 2 25 gms   Estimated Kt/V   99   Access type: AV fistula   Access Status: Yes, describe: maintained  during tx, 15 gauge needles    Report called to primary nurse   Yes Holley Thornton RN  For 3 hr tx, 1L net fluid removal, 4K bath  Problem: METABOLIC, FLUID AND ELECTROLYTES - ADULT  Goal: Electrolytes maintained within normal limits  Description: INTERVENTIONS:  - Monitor labs and assess patient for signs and symptoms of electrolyte imbalances  - Administer electrolyte replacement as ordered  - Monitor response to electrolyte replacements, including repeat lab results as appropriate  - Instruct patient on fluid and nutrition as appropriate  Outcome: Progressing  Goal: Fluid balance maintained  Description: INTERVENTIONS:  - Monitor labs   - Monitor I/O and WT  - Instruct patient on fluid and nutrition as appropriate  - Assess for signs & symptoms of volume excess or deficit  Outcome: Progressing

## 2021-11-09 NOTE — ASSESSMENT & PLAN NOTE
· Transitioned to ESRD now dialysis dependent - Nephrology following  · See plan for associated perinephric abscess and underlying obstructive uropathy below

## 2021-11-09 NOTE — SPEECH THERAPY NOTE
Speech Language/Pathology    Speech/Language Pathology Progress Note    Patient Name: Ralph Drake  WFLWU'Y Date: 11/9/2021     Problem List  Principal Problem:    Sepsis on admission  Active Problems:    History of pulmonary embolism    Obstructive uropathy    Secondary hyperparathyroidism of renal origin ([address] )    Essential hypertension    Polycythemia vera     Diarrhea    Anemia of chronic disease    Acute renal failure superimposed on CKD stage 5    Perinephric abscess    Pleural effusion    Swelling of right upper extremity    Hypothyroidism    Bacteremia       Past Medical History  Past Medical History:   Diagnosis Date    Anxiety     Bowel obstruction (HCC)     Chronic kidney disease     Chronic kidney disease (CKD), stage IV (severe) (HCC)     stage IV    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    Circulation problem     Compression fracture of cervical spine ([facility] )     COVID-19 07/2021    hospitalized     Hernia of abdominal cavity     History of kidney problems     History of transfusion     Hydronephrosis     Hypertension     IBS (irritable bowel syndrome)     Incontinence     Lung mass     Improving on PET/CT 1/2016    Polycythemia vera ([facility] [facility] )     Pulmonary embolism ([facility] ) 2014    Shingles     Urinary tract infection         Past Surgical History  Past Surgical History:   Procedure Laterality Date    ABDOMINAL ADHESION SURGERY N/A 8/9/2020    Procedure: LYSIS ADHESIONS;  Surgeon: Marlys Montiel DO;  [facility];  Service: General    BLADDER SUSPENSION      BOTOX INJECTION N/A 7/27/2016    Procedure: BOTOX INJECTION ;  Surgeon: Judith Aviles MD;  [facility];  Service:     CHOLECYSTECTOMY N/A     COLONOSCOPY      CYSTECTOMY, RADICAL WITH ILEOCONDUIT N/A 10/4/2016    Procedure: Jerene Light ;  Surgeon: Judith Aviles MD;  [facility];  Service:    Ivy Feil W/ RETROGRADES Bilateral 7/27/2016 Procedure: CYSTOSCOPY; RETROGRADE PYELOGRAM ;  Surgeon: Nic Springer MD;  [facility];  Service:     HERNIA REPAIR      IR AV FISTULAGRAM/GRAFTOGRAM  2/10/2021    IR AV FISTULAGRAM/GRAFTOGRAM  10/27/2021    IR DRAINAGE TUBE CHECK/CHANGE/REPOSITION/REINSERTION/UPSIZE  10/27/2021    IR DRAINAGE TUBE PLACEMENT  10/13/2021    IR DRAINAGE TUBE PLACEMENT  11/1/2021    IR NEPHROSTOMY TO NEPHROURETERAL STENT  5/15/2021    IR NEPHROSTOMY TO NEPHROURETERAL STENT  6/9/2021    IR NEPHROSTOMY TUBE CHECK AND/OR REMOVAL  6/16/2021    IR NEPHROSTOMY TUBE CHECK/CHANGE/REPOSITION/REINSERTION/UPSIZE  5/14/2021    IR NEPHROSTOMY TUBE CHECK/CHANGE/REPOSITION/REINSERTION/UPSIZE  5/21/2021    IR NEPHROSTOMY TUBE CHECK/CHANGE/REPOSITION/REINSERTION/UPSIZE  9/16/2021    IR NEPHROSTOMY TUBE CHECK/CHANGE/REPOSITION/REINSERTION/UPSIZE  10/4/2021    IR NEPHROSTOMY TUBE PLACEMENT  5/10/2021    IR NEPHROSTOMY TUBE PLACEMENT  9/20/2021    IR NON-TUNNELED CENTRAL LINE PLACEMENT  7/17/2020    IR NON-TUNNELED CENTRAL LINE PLACEMENT  8/14/2020    IR NON-TUNNELED CENTRAL LINE PLACEMENT  7/16/2021    IR NON-TUNNELED CENTRAL LINE PLACEMENT  10/11/2021    IR THORACENTESIS  10/11/2021    IR TUNNELED DIALYSIS CATHETER PLACEMENT  10/20/2021    LAPAROTOMY N/A 8/9/2020    Procedure: LAPAROTOMY EXPLORATORY, PARASTOMAL HERNIA REPAIR WITH MESH;  Surgeon: Titi Turner DO;  [facility];  Service: General    [place] ANASTOMOSIS,AV,ANY SITE Right 1/5/2021    Procedure: Creation of right brachiobasilic fistula; Surgeon: Faustina Chairez MD;  [facility];  Service: Vascular    KY COLONOSCOPY FLX DX W/COLLJ SPEC WHEN PFRMD N/A 8/31/2016    Procedure: COLONOSCOPY;  Surgeon: Yuly Mathews MD;  Location: [facility];   Service: Gastroenterology    [place] CYSTOSCOPY,INSERT URETERAL STENT Bilateral 7/27/2016    Procedure: STENT INSERTION; EXCISION OF MESH ;  Surgeon: Nic Springer MD;  [facility];  Service: Urology  KS REVISE AV FISTULA,W/O THROMBECTOMY Right 9/30/2021    Procedure: Superficialization and transposition of right brachiobasilic fistula; Surgeon: Kalee Chairez MD;  [facility];  Service: Vascular    TONSILLECTOMY      TUBAL LIGATION      WISDOM TOOTH EXTRACTION           Subjective:  Pt awake, alert, self feeding lunch  Current Diet: level 2 w/ thin   Objective:  Pt seen for ongoing dx dysphagia tx, ?able trials of upgraded material for diet change  Pt w/ slow but adequate manipulation of the meat & potatoes  Swallows are prompt, no overt coughing or clearing  Tends to require mult swallows per bite  Cued to alternate w/ the thin  Pt took 4 oz thin juice w/o overt s/s aspiration  Attempted upgraded trials- pt states she does not have dentures and is not able to eat any harder textured foods  Assessment:  Pt tolerating level 2 w/ thin, does not desire upgrade until she has her dentures  Plan/Recommendations:  Continue level 2 w/ thin  Upgrade at facility w/ dentures when pt desires      No further f/u

## 2021-11-09 NOTE — ASSESSMENT & PLAN NOTE
· Presented with left renal hematoma  Pigtail catheter is noted medial to kidney  Renal pelvis may be collapsed  Hemorrhage and thickening extending in the combined interfascial place of retrospective as well as some high density fluid within     · S/p IR drainage 10/12 with JOSE ANTONIO drain placement x 2  · S/p repeat IR drainage 11/1 w/ negative cultures  · Repeat CT imaging on 11/8 revealed resolution perinephric abscess and improvement/resolution of various other noted abscesses on report - s/p removal of JOSE ANTONIO drains - discussed w/ Infectious Disease today - antibiotic regimen discontinued

## 2021-11-09 NOTE — PHYSICAL THERAPY NOTE
Physical Therapy Cancellation Note    PT ATTEMPTED TREATMENT SESSION HOWEVER PATIENT OFF FLOOR AT THIS TIME AT HD  PT WILL RE-ATTEMPT LATER TODAY IF TIME AND AS MEDICALLY APPROPRIATE AND CONTINUE TO FOLLOW ON CASELOAD      NILAM PABON PT, DPT

## 2021-11-10 ENCOUNTER — PATIENT OUTREACH (OUTPATIENT)
Dept: CASE MANAGEMENT | Facility: HOSPITAL | Age: 76
End: 2021-11-10

## 2021-11-10 LAB
ANION GAP SERPL CALCULATED.3IONS-SCNC: 12 MMOL/L (ref 4–13)
BASOPHILS # BLD AUTO: 0.05 THOUSANDS/ΜL (ref 0–0.1)
BASOPHILS NFR BLD AUTO: 1 % (ref 0–1)
BUN SERPL-MCNC: 13 MG/DL (ref 5–25)
CALCIUM SERPL-MCNC: 8.4 MG/DL (ref 8.3–10.1)
CHLORIDE SERPL-SCNC: 102 MMOL/L (ref 100–108)
CO2 SERPL-SCNC: 27 MMOL/L (ref 21–32)
CREAT SERPL-MCNC: 3.51 MG/DL (ref 0.6–1.3)
EOSINOPHIL # BLD AUTO: 0.09 THOUSAND/ΜL (ref 0–0.61)
EOSINOPHIL NFR BLD AUTO: 2 % (ref 0–6)
ERYTHROCYTE [DISTWIDTH] IN BLOOD BY AUTOMATED COUNT: 18.1 % (ref 11.6–15.1)
GFR SERPL CREATININE-BSD FRML MDRD: 12 ML/MIN/1.73SQ M
GLUCOSE SERPL-MCNC: 79 MG/DL (ref 65–140)
HCT VFR BLD AUTO: 25.3 % (ref 34.8–46.1)
HGB BLD-MCNC: 7.9 G/DL (ref 11.5–15.4)
IMM GRANULOCYTES # BLD AUTO: 0.12 THOUSAND/UL (ref 0–0.2)
IMM GRANULOCYTES NFR BLD AUTO: 2 % (ref 0–2)
LYMPHOCYTES # BLD AUTO: 0.88 THOUSANDS/ΜL (ref 0.6–4.47)
LYMPHOCYTES NFR BLD AUTO: 16 % (ref 14–44)
MCH RBC QN AUTO: 28.7 PG (ref 26.8–34.3)
MCHC RBC AUTO-ENTMCNC: 31.2 G/DL (ref 31.4–37.4)
MCV RBC AUTO: 92 FL (ref 82–98)
MONOCYTES # BLD AUTO: 0.48 THOUSAND/ΜL (ref 0.17–1.22)
MONOCYTES NFR BLD AUTO: 9 % (ref 4–12)
NEUTROPHILS # BLD AUTO: 3.92 THOUSANDS/ΜL (ref 1.85–7.62)
NEUTS SEG NFR BLD AUTO: 70 % (ref 43–75)
NRBC BLD AUTO-RTO: 0 /100 WBCS
PLATELET # BLD AUTO: 185 THOUSANDS/UL (ref 149–390)
PMV BLD AUTO: 10.4 FL (ref 8.9–12.7)
POTASSIUM SERPL-SCNC: 3.6 MMOL/L (ref 3.5–5.3)
RBC # BLD AUTO: 2.75 MILLION/UL (ref 3.81–5.12)
SODIUM SERPL-SCNC: 141 MMOL/L (ref 136–145)
WBC # BLD AUTO: 5.54 THOUSAND/UL (ref 4.31–10.16)

## 2021-11-10 PROCEDURE — 80048 BASIC METABOLIC PNL TOTAL CA: CPT | Performed by: INTERNAL MEDICINE

## 2021-11-10 PROCEDURE — 99232 SBSQ HOSP IP/OBS MODERATE 35: CPT | Performed by: INTERNAL MEDICINE

## 2021-11-10 PROCEDURE — 97530 THERAPEUTIC ACTIVITIES: CPT

## 2021-11-10 PROCEDURE — 85025 COMPLETE CBC W/AUTO DIFF WBC: CPT | Performed by: INTERNAL MEDICINE

## 2021-11-10 PROCEDURE — 97110 THERAPEUTIC EXERCISES: CPT

## 2021-11-10 RX ADMIN — APIXABAN 2.5 MG: 2.5 TABLET, FILM COATED ORAL at 16:46

## 2021-11-10 RX ADMIN — PANTOPRAZOLE SODIUM 40 MG: 40 TABLET, DELAYED RELEASE ORAL at 05:42

## 2021-11-10 RX ADMIN — LEVOTHYROXINE SODIUM 75 MCG: 75 TABLET ORAL at 08:27

## 2021-11-10 RX ADMIN — ATORVASTATIN CALCIUM 40 MG: 40 TABLET, FILM COATED ORAL at 21:44

## 2021-11-10 RX ADMIN — Medication 250 MG: at 08:27

## 2021-11-10 RX ADMIN — CALCITRIOL 0.25 MCG: 0.25 CAPSULE, LIQUID FILLED ORAL at 08:27

## 2021-11-10 RX ADMIN — CHOLECALCIFEROL TAB 10 MCG (400 UNIT) 800 UNITS: 10 TAB at 08:27

## 2021-11-10 RX ADMIN — PANTOPRAZOLE SODIUM 40 MG: 40 TABLET, DELAYED RELEASE ORAL at 16:46

## 2021-11-10 RX ADMIN — APIXABAN 2.5 MG: 2.5 TABLET, FILM COATED ORAL at 08:27

## 2021-11-10 RX ADMIN — METOPROLOL TARTRATE 25 MG: 25 TABLET, FILM COATED ORAL at 16:46

## 2021-11-10 RX ADMIN — METOPROLOL TARTRATE 25 MG: 25 TABLET, FILM COATED ORAL at 08:28

## 2021-11-10 RX ADMIN — CITALOPRAM HYDROBROMIDE 20 MG: 20 TABLET ORAL at 08:27

## 2021-11-10 RX ADMIN — MELATONIN TAB 3 MG 3 MG: 3 TAB at 21:44

## 2021-11-10 RX ADMIN — LOPERAMIDE HYDROCHLORIDE 2 MG: 2 CAPSULE ORAL at 21:44

## 2021-11-10 NOTE — UTILIZATION REVIEW
Continued Stay Review    Date: 11/10/2021                      Current Patient Class: inpatient  Current Level of Care: med/surg  HPI:76 y o  female initially admitted on 10/9 with sepsis   Assessment/Plan: pt with no c/o  Remains weak/fatigued  Awaiting IP rehab bed  Abx regimen d/c'd on 11/9  Continue supportive care, po meds  Vital Signs:  Date/Time Temp Pulse Resp BP MAP (mmHg) SpO2 O2 Device   11/10/21 15:53:44 98 °F (36 7 °C) 85 18 125/81 96 95 % --   11/10/21 07:39:56 97 2 °F (36 2 °C) Abnormal  84 18 132/83 99 96 % --   11/09/21 21:06:02 98 5 °F (36 9 °C) 89 18 113/65 81 92 % --   11/09/21 2030 -- -- -- -- -- -- None (Room air)   11/09/21 15:35:39 97 9 °F (36 6 °C) -- 18 115/65 82 93 % --   11/09/21 1108 -- 80 16 100/63 -- -- --   11/09/21 1030 -- 82 16 117/67 81 -- --   11/09/21 1000 -- 81 16 116/68 81 -- --   11/09/21 0930 -- 86 16 116/65 79 -- --   11/09/21 0900 -- 82 16 114/65 88 -- --   11/09/21 0830 -- 82 16 116/78 88 -- --   11/09/21 0808 98 1 °F (36 7 °C) 87 16 120/76 87 -- --   11/08/21 22:28:25 97 4 °F (36 3 °C) Abnormal  76 -- 130/74 93 92 % --   11/08/21 2059 -- -- -- -- -- -- None (Room air)   11/08/21 15:06:51 97 7 °F (36 5 °C) 78 18 127/74 92 92 % --   11/08/21 07:39:03 98 2 °F (36 8 °C) 81 20 131/75 94 94 % --       Pertinent Labs/Diagnostic Results:     Results from last 7 days   Lab Units 11/10/21  0455 11/09/21  0531 11/08/21  0458 11/06/21  0708 11/06/21  0708 11/05/21  0540 11/05/21  0540   WBC Thousand/uL 5 54 4 95 5 11   < > 4 91   < > 4 78   HEMOGLOBIN g/dL 7 9* 7 8* 7 7*  --  7 6*  --  7 9*   HEMATOCRIT % 25 3* 25 4* 24 7*  --  24 5*  --  25 1*   PLATELETS Thousands/uL 185 193 177   < > 192   < > 200   NEUTROS ABS Thousands/µL 3 92  --   --   --   --   --  3 50   BANDS PCT %  --   --  3  --   --   --   --     < > = values in this interval not displayed         Results from last 7 days   Lab Units 11/10/21  0455 11/09/21  0531 11/08/21  0458 11/06/21  0708 11/05/21  0540 11/04/21  0531 11/04/21  0531   SODIUM mmol/L 141 138 135* 139 136   < > 138   POTASSIUM mmol/L 3 6 3 4* 3 7 3 5 3 1*   < > 2 9*   CHLORIDE mmol/L 102 105 104 106 101   < > 101   CO2 mmol/L 27 25 28 27 27   < > 28   ANION GAP mmol/L 12 8 3* 6 8   < > 9   BUN mg/dL 13 26* 19 16 11   < > 17   CREATININE mg/dL 3 51* 5 23* 4 40* 4 29* 2 95*   < > 4 01*   EGFR ml/min/1 73sq m 12 7 9 9 15   < > 10   CALCIUM mg/dL 8 4 8 6 8 7 8 3 8 3   < > 8 2*   PHOSPHORUS mg/dL  --   --   --  2 9  --   --  3 4    < > = values in this interval not displayed  Results from last 7 days   Lab Units 11/06/21  0708 11/05/21  0540 11/04/21  0531   AST U/L  --  25  --    ALT U/L  --  7*  --    ALK PHOS U/L  --  200*  --    TOTAL PROTEIN g/dL  --  5 7*  --    ALBUMIN g/dL 1 2* 1 3* 1 3*   TOTAL BILIRUBIN mg/dL  --  0 52  --        Results from last 7 days   Lab Units 11/10/21  0455 11/09/21  0531 11/08/21  0458 11/06/21  0708 11/05/21  0540 11/04/21  0531   GLUCOSE RANDOM mg/dL 79 87 95 90 94 92         Medications:   Scheduled Medications:  apixaban, 2 5 mg, Oral, BID  atorvastatin, 40 mg, Oral, HS  calcitriol, 0 25 mcg, Oral, Daily  cholecalciferol, 800 Units, Oral, Daily  citalopram, 20 mg, Oral, Daily  levothyroxine, 75 mcg, Oral, Daily  melatonin, 3 mg, Oral, HS  metoprolol tartrate, 25 mg, Oral, BID  pantoprazole, 40 mg, Oral, BID AC  saccharomyces boulardii, 250 mg, Oral, Daily    PRN Meds:  acetaminophen, 650 mg, Oral, Q6H PRN  loperamide, 2 mg, Oral, 4x Daily PRN 11/9 x1  metoprolol, 2 5 mg, Intravenous, Q6H PRN  ondansetron, 4 mg, Intravenous, Q6H PRN        Discharge Plan: TBD    Network Utilization Review Department  ATTENTION: Please call with any questions or concerns to [phone] and carefully listen to the prompts so that you are directed to the right person   All voicemails are confidential   Farrel Bodily all requests for admission clinical reviews, approved or denied determinations and any other requests to dedicated fax number below belonging to the campus where the patient is receiving treatment   List of dedicated fax numbers for the Facilities:  [address] [place] [address] DENIALS (Administrative/Medical Necessity) [phone]   [address]  [facility] (Maternity/NICU/Pediatrics) [phone]   [address]  [address] [place] [address] [address] [place] [address] [address] [address] [place] [address] [phone]

## 2021-11-10 NOTE — ASSESSMENT & PLAN NOTE
Presented with left renal hematoma  Pigtail catheter is noted medial to kidney  Renal pelvis may be collapsed  Hemorrhage and thickening extending in the combined interfascial place of retrospective as well as some high density fluid within     S/p IR drainage 10/12 with JOSE ANTONIO drain placement x 2  S/p repeat IR drainage 11/1 w/ negative cultures  Repeat CT imaging on 11/8 revealed resolution perinephric abscess and improvement/resolution of various other noted abscesses on report - s/p removal of JOSE ANTONIO drains - discussed w/ Infectious Disease on 11/9 and antibiotic regimen discontinued

## 2021-11-10 NOTE — PROGRESS NOTES
[facility]  Progress Note - Bernarda Moody [birth year], 77 y o  female MRN: [id number]  Unit/Bed#: [facility] 929-01 Encounter: [id number]  Primary Care Provider: Ale Bello MD   Date and time admitted to hospital: 10/9/2021  2:19 PM      * Sepsis on admission  Assessment & Plan  Previously with fever coupled with tachycardia and elevated procalcitonin  Likely secondary to infected left renal hematoma/perinephric abscess -> Fusobacterium bacteremia noted - fluid culture from 10/13 s/p IR-guided drainage growing Enterobacter/Enterococcus -> see antibiotic regimen below    Perinephric abscess  Assessment & Plan  Presented with left renal hematoma  Pigtail catheter is noted medial to kidney  Renal pelvis may be collapsed  Hemorrhage and thickening extending in the combined interfascial place of retrospective as well as some high density fluid within     S/p IR drainage 10/12 with JOSE ANTONIO drain placement x 2  S/p repeat IR drainage 11/1 w/ negative cultures  Repeat CT imaging on 11/8 revealed resolution perinephric abscess and improvement/resolution of various other noted abscesses on report - s/p removal of JOSE ANTONIO drains - discussed w/ Infectious Disease on 11/9 and antibiotic regimen discontinued     Acute renal failure superimposed on CKD stage 5  Assessment & Plan  Transitioned to ESRD now dialysis dependent - nephrology following  See plan for associated perinephric abscess and underlying obstructive uropathy below  Awaiting skilled rehab placement w/ coordination for outpatient dialysis chair time    Bacteremia  Assessment & Plan  Blood cultures from 10/9 growing Fusobacterium - discontinued IV Zosyn regimen today  Repeat blood cultures 10/12 are negative  Appreciate Infectious Disease evaluation    Swelling of right upper extremity  Assessment & Plan  Underwent repeat RUE vascular doppler study noting "a narrowing in the subclavian vein at the clavicle created elevated PSV and turbulent flow  The dialysis AVF appears to be matured with adequate diameter, flow volumes and less than 6mm in depth throughout the arm  Antegrade, high resistant blunted flow noted in the peripheral arteries  No evidence of outflow obstruction  No evidence of a pseudoaneurysm  No evidence of a large venous branch "  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  S/p fistulagram  Improved with hemodialysis  IR recommending compression w/ ACE bandage - no need for repeat fistulogram  Appreciate PT/OT input w/ recommendation of skilled rehab    Obstructive uropathy  Assessment & Plan  Chronic bilateral hydronephrosis w/ recent removal of stents  Followed by urology and nephrology     Essential hypertension  Assessment & Plan  Continue Lopressor    History of pulmonary embolism  Assessment & Plan  Continue Eliquis    Hypothyroidism  Assessment & Plan  C/w Synthroid     Anemia of chronic disease  Assessment & Plan  Monitor H/H  S/p PRBC transfusion on 10/29 - continue to transfuse if necessary    Polycythemia vera   Assessment & Plan  With associated history of MDS  Follows outpatient hematology  Currently off a Jakafi regimen for polycythemia vera - undergoes Aranesp on every four weeks  Monitor CBC    Diarrhea  Assessment & Plan  Chronic issue  PRN Imodium on board      DVT Prophylaxis:  Eliquis      Patient Centered Rounds:  I have performed bedside rounds and discussed plan of care with nursing today  Discussions with Specialists or Other Care Team Provider:  see above assessments if applicable    Education and Discussions with Family / Patient:  Patient at bedside    Time Spent for Care:  32 minutes  More than 50% of total time spent on counseling and coordination of care as described above      Current Length of Stay: 32 day(s)    Current Patient Status: Inpatient   Certification Statement:  Patient will continue to require additional hospital stay due to assessments as noted above  Code Status: Level 1 - Full Code        Subjective:     Seen/examined earlier today  Resting fairly comfortably without acute complaints  Denies pain at this time  Remains weak/fatigued  Understands that skilled rehab placement and dialysis chair time are pending  Objective:     Vitals:   Temp (24hrs), Av 9 °F (36 6 °C), Min:97 2 °F (36 2 °C), Max:98 5 °F (36 9 °C)    Temp:  [97 2 °F (36 2 °C)-98 5 °F (36 9 °C)] 98 °F (36 7 °C)  HR:  [84-89] 85  Resp:  [18] 18  BP: (113-132)/(65-83) 125/81  SpO2:  [92 %-96 %] 95 %  Body mass index is 35 31 kg/m²  Input and Output Summary (last 24 hours): Intake/Output Summary (Last 24 hours) at 11/10/2021 1636  Last data filed at 11/10/2021 1300  Gross per 24 hour   Intake 360 ml   Output 150 ml   Net 210 ml       Physical Exam:     GENERAL:  Weak/fatigued  HEAD:  Normocephalic - atraumatic  EYES: PERRL - EOMI   MOUTH:  Mucosa moist  NECK:  Supple - full range of motion  CARDIAC:  Regular rate/rhythm - S1/S2 positive  PULMONARY:  Clear breath sounds bilaterally - nonlabored respirations  ABDOMEN:  Soft - nontender/nondistended - active bowel sounds  MUSCULOSKELETAL:  Motor strength/range of motion deconditioned - right forearm/hand pitting edema noted - bilateral LE edema noted  NEUROLOGIC:  Cognitive impairment noted  SKIN:  Chronic wrinkles/blemishes   PSYCHIATRIC:  Mood/affect flat      Additional Data:     Labs & Recent Cultures:    Results from last 7 days   Lab Units 11/10/21  0455 21  0531 21  0458   WBC Thousand/uL 5 54   < > 5 11   HEMOGLOBIN g/dL 7 9*   < > 7 7*   HEMATOCRIT % 25 3*   < > 24 7*   PLATELETS Thousands/uL 185   < > 177   BANDS PCT %  --   --  3   NEUTROS PCT % 70  --   --    LYMPHS PCT % 16  --   --    LYMPHO PCT %  --   --  11*   MONOS PCT % 9  --   --    MONO PCT %  --   --  3*   EOS PCT % 2  --  2    < > = values in this interval not displayed       Results from last 7 days   Lab Units 11/10/21  0851 11/06/21  0708 11/05/21  0540   POTASSIUM mmol/L 3 6   < > 3 1*   CHLORIDE mmol/L 102   < > 101   CO2 mmol/L 27   < > 27   BUN mg/dL 13   < > 11   CREATININE mg/dL 3 51*   < > 2 95*   CALCIUM mg/dL 8 4   < > 8 3   ALK PHOS U/L  --   --  200*   ALT U/L  --   --  7*   AST U/L  --   --  25    < > = values in this interval not displayed  Last 24 Hours Medication List:   Current Facility-Administered Medications   Medication Dose Route Frequency Provider Last Rate    acetaminophen  650 mg Oral Q6H PRN Ortiz Kamara MD      apixaban  2 5 mg Oral BID Ernestina Barrett DO      atorvastatin  40 mg Oral HS Ortiz Kamara MD      calcitriol  0 25 mcg Oral Daily Ortiz Kamara MD      cholecalciferol  800 Units Oral Daily Ortiz Kamara MD      citalopram  20 mg Oral Daily Ortiz Kamara MD      levothyroxine  75 mcg Oral Daily Ortiz Kamara MD      loperamide  2 mg Oral 4x Daily PRN Alonso Hood PA-C      melatonin  3 mg Oral HS Ortiz Kamara MD      metoprolol  2 5 mg Intravenous Q6H PRN Alonso Hood PA-C      metoprolol tartrate  25 mg Oral BID Alonso Hood PA-C      ondansetron  4 mg Intravenous Q6H PRN Ortiz Kamara MD      pantoprazole  40 mg Oral BID AC Mumtaz Laguerre DO      saccharomyces boulardii  250 mg Oral Daily Ortiz Kamara MD                        ** Please Note: This note is constructed using a voice recognition dictation system  An occasional wrong word/phrase or sound-a-like substitution may have been picked up by dictation device due to the inherent limitations of voice recognition software  Read the chart carefully and recognize, using reasonable context, where substitutions may have occurred  **

## 2021-11-10 NOTE — PROGRESS NOTES
Progress Note - Infectious Disease   Gracie Esquivel 77 y o  female MRN: [id number]  Unit/Bed#: [facility] 929-01 Encounter: [id number]      Impression/Plan:  1  Sepsis, POA   Patient presented with progressing flank/abdominal discomfort likely related to problem 3  Clinical parameters had improved  Overall poor progress likely due to 4  Tachycardia/borderline blood pressures 10/28, antibiotics restarted given 3  Currently stable  Patient has completed almost 2 weeks of intravenous Zosyn with more than a week completed since the time of the drainage  Monitor off all antibiotics  Recheck CBC with diff and CMP  Supportive care     2  Fusobacterium bacteremia   Source unknown, possibly transient given 1 or occult GI source  CT imaging of the abdomen on admission unremarkable  Repeat imaging with new collections noted in the abdomen and partial SBO, potential source   Drains placed   Patient without any teeth  Repeat blood cultures without growth   Not isolated on fluid cultures  No prior C-scope on chart review   EGD unremarkable  Imaging of the right upper quadrant ultrasound unremarkable   Completed empiric course of Flagyl    No further antibiotics for this issue  GI follow-up  C scope once more stable/outpatient     3  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   Patient has a chronic obstructive uropathy involving the left side and recently had PCN removal   Subsequently developed bleeding at the site and hematoma on imaging likely due to chronic anticoagulation   IR aspiration and drain placement on 10/13 and earlier urine cultures with same organisms   Drains likely provided significant source control   IR drain check noted one tube dislodged   Repeat CT scan with persistent collections, uncertain if these are sterile   Status post repeat aspiration with cultures   With changes in vitals on 10/28, antibiotics restarted   Repeat body fluid cultures negative   Repeat CT scan with resolution of perinephric abscess, and other collection in the gutter   Decreased collection in the cul-de-sac  Patient has completed more than a week of antibiotics from the time of the drain placement  Monitor off all antibiotics  Drain management  Supportive care     4  Adrian Rich on CKD with fistula  Patient with baseline chronic kidney disease with fistula created in the right upper extremity   Stenosis causing swelling  Kidney function worsened over entire admission   Initiated on dialysis  Nephrology follow-up  Hemodialysis  Vascular surgery follow-up/imaging     5  History of C diff and diarrhea   Patient had a history of C diff in 2019  She has had repeat PCRs in July 2021 and now negative despite recent diarrhea  Diarrhea is now chronic  Being controlled Imodium  No additional antibiotics for now  Monitor stool output  No C diff prophylaxis for now     6  Polycythemia vera and MDS   Recent drop in hemoglobin and leukopenia likely related  Ongoing management/supportive care as per primary   Recommend follow-up with Oncology as outpatient      7  Prior PE on anticoagulation   Likely risk factor for issues as above  AC as per primary        8  Transaminitis   Patient noted to have slowly increasing alkaline phosphatase and AST   In the setting of this bacteremia consider occult abscess  Now downtrending/stable   Liver/RUQ ultrasound unremarkable   LFTs are much improved  Antibiotics as above  GI follow-up     9  Melena   Possible upper GI bleed   Ongoing follow-up and workup as per GI   EGD reportedly unremarkable   Additional care as per primary      Antibiotics:  Status post 13 days of Zosyn  Post drain day 9    Subjective:  Patient has no fever, chills, sweats; no nausea, vomiting, diarrhea; no cough, shortness of breath; no pain  No new symptoms      Objective:  Vitals:  Temp:  [97 2 °F (36 2 °C)-98 5 °F (36 9 °C)] 97 2 °F (36 2 °C)  HR:  [84-89] 84  Resp:  [18] 18  BP: (113-132)/(65-83) 132/83  SpO2:  [92 %-96 %] 96 %  Temp (24hrs), Av 9 °F (36 6 °C), Min:97 2 °F (36 2 °C), Max:98 5 °F (36 9 °C)  Current: Temperature: (!) 97 2 °F (36 2 °C)    Physical Exam:   General Appearance:  Alert, interactive, nontoxic, no acute distress  Throat: Oropharynx moist without lesions  Lungs:   Clear to auscultation bilaterally; no wheezes, rhonchi or rales; respirations unlabored   Heart:  RRR; no murmur, rub or gallop   Abdomen:   Soft, non-tender, non-distended, positive bowel sounds  Extremities: No clubbing, cyanosis or edema   Skin: No new rashes or lesions  No draining wounds noted  Labs, Imaging, & Other studies:   All pertinent labs and imaging studies were personally reviewed  Results from last 7 days   Lab Units 11/10/21  0455 11/09/21  0531 21  0458   WBC Thousand/uL 5 54 4 95 5 11   HEMOGLOBIN g/dL 7 9* 7 8* 7 7*   PLATELETS Thousands/uL 185 193 177     Results from last 7 days   Lab Units 11/10/21  0455 11/09/21  0531 21  0531 21  0458 21  0458 21  0708 21  0540   SODIUM mmol/L 141  --  138  --  135*   < > 136   POTASSIUM mmol/L 3 6   < > 3 4*   < > 3 7   < > 3 1*   CHLORIDE mmol/L 102   < > 105   < > 104   < > 101   CO2 mmol/L 27   < > 25   < > 28   < > 27   BUN mg/dL 13   < > 26*   < > 19   < > 11   CREATININE mg/dL 3 51*   < > 5 23*   < > 4 40*   < > 2 95*   EGFR ml/min/1 73sq m 12   < > 7   < > 9   < > 15   CALCIUM mg/dL 8 4   < > 8 6   < > 8 7   < > 8 3   AST U/L  --   --   --   --   --   --  25   ALT U/L  --   --   --   --   --   --  7*   ALK PHOS U/L  --   --   --   --   --   --  200*    < > = values in this interval not displayed

## 2021-11-10 NOTE — ASSESSMENT & PLAN NOTE
Transitioned to ESRD now dialysis dependent - nephrology following  See plan for associated perinephric abscess and underlying obstructive uropathy below  Awaiting skilled rehab placement w/ coordination for outpatient dialysis chair time

## 2021-11-10 NOTE — PHYSICAL THERAPY NOTE
PHYSICAL THERAPY NOTE          Patient Name: David WATTY Date: 11/10/2021     11/10/21 1047   PT Last Visit   PT Visit Date 11/10/21   Note Type   Note Type Treatment   Pain Assessment   Pain Assessment Tool 0-10   Pain Score No Pain   Restrictions/Precautions   Weight Bearing Precautions Per Order No   Other Precautions Cognitive; Chair Alarm; Bed Alarm; Fall Risk;Pain   General   Chart Reviewed Yes   Response to Previous Treatment Patient with no complaints from previous session  Family/Caregiver Present No   Cognition   Overall Cognitive Status Impaired   Arousal/Participation Alert; Cooperative   Attention Within functional limits   Orientation Level Oriented X4   Memory Decreased recall of precautions   Following Commands Follows multistep commands with increased time or repetition   Comments Pt with flat affect, cooperative to participate in therapy session    Bed Mobility   Supine to Sit Unable to assess   Sit to Supine Unable to assess   Additional Comments Pt sitting EOB upon arrival with nursing staff  Pt sitting upright in bedside chair with chair alarm on with all needs within reach at the end of therapy session  Transfers   Sit to Stand 3  Moderate assistance   Additional items Assist x 2; Increased time required;Verbal cues   Stand to Sit 3  Moderate assistance   Additional items Assist x 2; Increased time required;Verbal cues   Additional Comments transfers with RW: cues for hand placement and sequencing    Ambulation/Elevation   Gait pattern Excessively slow; Short stride; Foward flexed;Decreased foot clearance;R Knee Gibran   Gait Assistance 3  Moderate assist   Additional items Assist x 2;Verbal cues; Tactile cues   Assistive Device Rolling walker   Distance 4 ft, 1 foot    Balance   Static Sitting Fair   Dynamic Sitting Fair   Static Standing Poor +   Dynamic Standing Poor   Ambulatory Poor   Endurance Deficit   Endurance Deficit Yes   Endurance Deficit Description gross weakness, deconditioning, fatigue    Activity Tolerance   Activity Tolerance Patient limited by fatigue   Medical Staff Made Aware Co-treatment with OT 2* increased medical complexity and multiple co-morbidities as well as decreased funcitonal status and decreased activity tolerance    Nurse Made Aware RN cleared pt to participate in therapy session    Exercises   Glute Sets Sitting;Bilateral;10 reps;AROM  (x2 sets)   Ankle Pumps Sitting;Bilateral;10 reps;AROM  (x2 sets)   Marching Sitting;Bilateral;10 reps;AROM  (x2 sets)   Balance training  sitting EOB ~8 min at S level without UE support; pt performed 3x STS throughout therapy session    Assessment   Prognosis Fair   Problem List Decreased strength;Decreased endurance; Impaired balance;Decreased mobility; Decreased skin integrity;Obesity   Assessment Pt seen for PT treatment session this date  Therapy session focused on sitting static/ dynamic sitting tolerance/ balance, functional transfers, gait training, therapeutic exercise and endurance training in order to improve overall mobility and independence  Pt requires assist of 2 for all mobility performed this date  Pt with improved sitting balance/ tolerance compared to previous session; requires supervision level when performing static/ dynamic tasks  Pt performed multiple STS from EOB/ bedside chair with mod Ax2; cues for hand placement and sequencing  Pt ambulated short distances with RW and mod Ax2; cues for foot placement, sequencing and RW management  Slight R knee buckling noted with ambulation  Pt performed/ tolerated seated b/l LE therex to hips, knees and ankles with no c/o increased pain/ discomfort  Pt continues to have decreased activity tolerance requiring frequent rest breaks before, during and after all mobility task  Pt making slow but steady progress toward goals   Pt was left sitting upright in bedside chair with chair alarm o at the end of PT session with all needs in reach  Pt would benefit from continued PT services while in hospital to address remaining limitations  PT to continue to follow pt and recommends post-acute rehab services after d/c  The patient's AM-PAC Basic Mobility Inpatient Short Form Low Function Raw Score 16 , Standardized Score is 25 72  A standardized score less 42 9 suggests the patient may benefit from discharge to post-acute rehab services  Please also refer to the recommendation of the Physical Therapist for safe discharge planning  Barriers to Discharge Inaccessible home environment;Decreased caregiver support   Goals   Patient Goals none stated    STG Expiration Date 11/15/21   PT Treatment Day 8   Plan   Treatment/Interventions Functional transfer training;LE strengthening/ROM; Endurance training; Therapeutic exercise;Patient/family training;Equipment eval/education;Gait training;Spoke to nursing;Spoke to case management;OT   Progress Progressing toward goals   PT Frequency Other (Comment)  (3-5x/wk )   Recommendation   PT Discharge Recommendation Post acute rehabilitation services   Equipment Recommended Wheelchair;Walker   PT - OK to Discharge Yes  (to rehab once medically cleared )   AM-PAC Basic Mobility Inpatient   Turning in Bed Without Bedrails 2   Lying on Back to Sitting on Edge of Flat Bed 2   Moving Bed to Chair 1   Standing Up From Chair 1   Walk in Room 1   Climb 3-5 Stairs 1   Basic Mobility Inpatient Raw Score 8   Turning Head Towards Sound 4   Follow Simple Instructions 4   Low Function Basic Mobility Raw Score 16   Low Function Basic Mobility Standardized Score 25 72     Keshia Velasquez, PT, DPT

## 2021-11-10 NOTE — OCCUPATIONAL THERAPY NOTE
Occupational Therapy Progress Note     Patient Name: Courtney CUENCA Date: 11/10/2021  Problem List  Principal Problem:    Sepsis on admission  Active Problems:    History of pulmonary embolism    Obstructive uropathy    Secondary hyperparathyroidism of renal origin [facility])    Essential hypertension    Polycythemia vera     Diarrhea    Anemia of chronic disease    Acute renal failure superimposed on CKD stage 5    Perinephric abscess    Pleural effusion    Swelling of right upper extremity    Hypothyroidism    Bacteremia            11/10/21 1048   OT Last Visit   OT Visit Date 11/10/21   Note Type   Note Type Treatment   Restrictions/Precautions   Weight Bearing Precautions Per Order No   Other Precautions Chair Alarm; Bed Alarm;Multiple lines; Fall Risk   General   Response to Previous Treatment Patient reporting fatigue but able to participate   Lifestyle   Autonomy PTA, pt reports being I with ADLs/IADLs, fnxl mobility, (+)    Reciprocal Relationships Neighbor   Service to Others Retired   Intrinsic Gratification Watching TV   Pain Assessment   Pain Assessment Tool 0-10   Pain Score No Pain   ADL   Where Assessed Edge of bed   LB Dressing Assistance 3  Moderate Assistance   LB Dressing Deficit Thread RLE into pants; Thread LLE into pants;Pull up over hips   Bed Mobility   Supine to Sit Unable to assess   Sit to Supine Unable to assess   Additional Comments Pt seated EOB upon arrival    Transfers   Sit to Stand 3  Moderate assistance   Additional items Assist x 2; Increased time required;Verbal cues   Stand to Sit 3  Moderate assistance   Additional items Assist x 2; Increased time required;Verbal cues   Additional Comments Transfers with RW    Functional Mobility   Functional Mobility 3  Moderate assistance   Additional Comments Ax2 minimal steps to chair + steps forward/back    Additional items Rolling walker   Therapeutic Exercise - ROM   UE-ROM Yes   ROM- Right Upper Extremities   R Shoulder AAROM; Flexion   R Elbow AROM;Elbow flexion;Elbow extension   R Wrist AROM; Wrist flexion;Wrist extension   R Position Seated   R Weight/Reps/Sets x20   RUE ROM Comment Pt with decreased ability to raise B/L shoulders   ROM - Left Upper Extremities    L Shoulder AAROM; Flexion   L Elbow AROM;Elbow flexion;Elbow extension   L Wrist AAROM;Wrist flexion;Wrist extension   L Position Seated   L Weight/Reps/Sets x20   LUE ROM Comment Pt with decreased ability to raise B/L shoulders   Cognition   Arousal/Participation Responsive; Cooperative   Attention Attends with cues to redirect   Orientation Level Oriented X4   Memory Within functional limits   Following Commands Follows one step commands with increased time or repetition   Comments Pt cooperative t/o session, flat affect   Activity Tolerance   Activity Tolerance Patient limited by fatigue   Medical Staff Made Aware PT Adilson Garrido RN    Assessment   Assessment Patient participated in Skilled OT session this date with interventions consisting of ADL re training with the use of correct body mechnaics, Energy Conservation techniques, therapeutic exercise to: increase functional use of BUEs, increase BUE muscle strength ,  therapeutic activities to: increase activity tolerance, increase dynamic sit/ stand balance during functional activity  and increase OOB/ sitting tolerance   Upon arrival patient was found seated at edge of bed  Pt demonstrated the following tasks: MOD A x 2 STS, fnxl mobility with RW  Pt requires MOD A for LBD  Pt also participates in UE ROM, increased A to perform proximal ex  Pt with poor endurance/activity tolerance  Requires frequent rest breaks  Patient continues to be functioning below baseline level, occupational performance remains limited secondary to factors listed above and increased risk for falls and injury  From OT standpoint, recommendation at time of d/c would be STR    Patient to benefit from continued Occupational Therapy treatment while in the hospital to address deficits as defined above and maximize level of functional independence with ADLs and functional mobility  Pt was left in chair with alarm on after session with all current needs met  The patient's raw score on the AM-PAC Daily Activity inpatient short form is 15, standardized score is 34 69, less than 39 4  Patients at this level are likely to benefit from discharge to post-acute rehabilitation services  Please refer to the recommendation of the Occupational Therapist for safe discharge planning  Plan   Treatment Interventions ADL retraining;Functional transfer training; Endurance training;UE strengthening/ROM; Patient/family training;Equipment evaluation/education; Compensatory technique education;Continued evaluation; Energy conservation; Activityengagement   Goal Expiration Date 11/22/21   OT Treatment Day 6   OT Frequency 2-3x/wk   Recommendation   OT Discharge Recommendation Post acute rehabilitation services   OT - OK to Discharge Yes  (to rehab when medically stable)   AM-PAC Daily Activity Inpatient   Lower Body Dressing 2   Bathing 2   Toileting 2   Upper Body Dressing 3   Grooming 3   Eating 3   Daily Activity Raw Score 15   Daily Activity Standardized Score (Calc for Raw Score >=11) 34 69   AM-PAC Applied Cognition Inpatient   Following a Speech/Presentation 3   Understanding Ordinary Conversation 4   Taking Medications 4   Remembering Where Things Are Placed or Put Away 4   Remembering List of 4-5 Errands 3   Taking Care of Complicated Tasks 3   Applied Cognition Raw Score 21   Applied Cognition Standardized Score 44 3     Geoffrey Goff MS, OTR/L

## 2021-11-10 NOTE — PLAN OF CARE
Problem: MOBILITY - ADULT  Goal: Maintain or return to baseline ADL function  Description: INTERVENTIONS:  -  Assess patient's ability to carry out ADLs; assess patient's baseline for ADL function and identify physical deficits which impact ability to perform ADLs (bathing, care of mouth/teeth, toileting, grooming, dressing, etc )  - Assess/evaluate cause of self-care deficits   - Assess range of motion  - Assess patient's mobility; develop plan if impaired  - Assess patient's need for assistive devices and provide as appropriate  - Encourage maximum independence but intervene and supervise when necessary  - Involve family in performance of ADLs  - Assess for home care needs following discharge   - Consider OT consult to assist with ADL evaluation and planning for discharge  - Provide patient education as appropriate  11/10/2021 1048 by Cecile Dubose RN  Outcome: Progressing  11/10/2021 1048 by Cecile Dubose RN  Outcome: Progressing  Goal: Maintains/Returns to pre admission functional level  Description: INTERVENTIONS:  - Perform BMAT or MOVE assessment daily    - Set and communicate daily mobility goal to care team and patient/family/caregiver  - Collaborate with rehabilitation services on mobility goals if consulted  - Perform Range of Motion   - Reposition patient every 2 hours    - Dangle patient   - Stand patient   - Ambulate patient   - Out of bed to chair   - Out of bed for meals   - Out of bed for toileting  - Record patient progress and toleration of activity level   11/10/2021 1048 by Cecile Dubose RN  Outcome: Progressing  11/10/2021 1048 by Cecile Dubose RN  Outcome: Progressing     Problem: Potential for Falls  Goal: Patient will remain free of falls  Description: INTERVENTIONS:  - Educate patient/family on patient safety including physical limitations  - Instruct patient to call for assistance with activity   - Consult OT/PT to assist with strengthening/mobility   - Keep Call bell within reach  - Keep bed low and locked with side rails adjusted as appropriate  - Keep care items and personal belongings within reach  - Initiate and maintain comfort rounds  - Make Fall Risk Sign visible to staff  - Offer Toileting every 2 Hours, in advance of need  - Initiate/Maintain alarms  - Obtain necessary fall risk management equipment  - Apply yellow socks and bracelet for high fall risk patients  - Consider moving patient to room near nurses station  11/10/2021 1048 by Jose Chowdhury RN  Outcome: Progressing  11/10/2021 1048 by Jose Chowdhury RN  Outcome: Progressing     Problem: Prexisting or High Potential for Compromised Skin Integrity  Goal: Skin integrity is maintained or improved  Description: INTERVENTIONS:  - Identify patients at risk for skin breakdown  - Assess and monitor skin integrity  - Assess and monitor nutrition and hydration status  - Monitor labs   - Assess for incontinence   - Turn and reposition patient  - Assist with mobility/ambulation  - Relieve pressure over bony prominences  - Avoid friction and shearing  - Provide appropriate hygiene as needed including keeping skin clean and dry  - Evaluate need for skin moisturizer/barrier cream  - Collaborate with interdisciplinary team   - Patient/family teaching  - Consider wound care consult   11/10/2021 1048 by Jose Chowdhury RN  Outcome: Progressing  11/10/2021 1048 by Jose Chowdhury RN  Outcome: Progressing     Problem: Nutrition/Hydration-ADULT  Goal: Nutrient/Hydration intake appropriate for improving, restoring or maintaining nutritional needs  Description: Monitor and assess patient's nutrition/hydration status for malnutrition  Collaborate with interdisciplinary team and initiate plan and interventions as ordered  Monitor patient's weight and dietary intake as ordered or per policy  Utilize nutrition screening tool and intervene as necessary  Determine patient's food preferences and provide high-protein, high-caloric foods as appropriate  INTERVENTIONS:  - Monitor oral intake, urinary output, labs, and treatment plans  - Assess nutrition and hydration status and recommend course of action  - Evaluate amount of meals eaten  - Assist patient with eating if necessary   - Allow adequate time for meals  - Recommend/ encourage appropriate diets, oral nutritional supplements, and vitamin/mineral supplements  - Order, calculate, and assess calorie counts as needed  - Recommend, monitor, and adjust tube feedings and TPN/PPN based on assessed needs  - Assess need for intravenous fluids  - Provide specific nutrition/hydration education as appropriate  - Include patient/family/caregiver in decisions related to nutrition  11/10/2021 1048 by Ramon Landa RN  Outcome: Progressing  11/10/2021 1048 by Ramon Landa RN  Outcome: Progressing     Problem: METABOLIC, FLUID AND ELECTROLYTES - ADULT  Goal: Electrolytes maintained within normal limits  Description: INTERVENTIONS:  - Monitor labs and assess patient for signs and symptoms of electrolyte imbalances  - Administer electrolyte replacement as ordered  - Monitor response to electrolyte replacements, including repeat lab results as appropriate  - Instruct patient on fluid and nutrition as appropriate  11/10/2021 1048 by Ramon Landa RN  Outcome: Progressing  11/10/2021 1048 by Ramon Landa RN  Outcome: Progressing  Goal: Fluid balance maintained  Description: INTERVENTIONS:  - Monitor labs   - Monitor I/O and WT  - Instruct patient on fluid and nutrition as appropriate  - Assess for signs & symptoms of volume excess or deficit  11/10/2021 1048 by Ramon Landa RN  Outcome: Progressing  11/10/2021 1048 by Ramon Landa RN  Outcome: Progressing

## 2021-11-10 NOTE — CASE MANAGEMENT
LOS 32  [facility] does not contract with [employer] and [employer]  TC to [facility] to discuss DCP  Ultimately when pt is discharged after STR, pt will be going to son's home in [facility] and will not be returning to own home  There was no response from any of the facilities in [facility] in [place]  All referrals resent  Will follow

## 2021-11-10 NOTE — ASSESSMENT & PLAN NOTE
Previously with fever coupled with tachycardia and elevated procalcitonin  Likely secondary to infected left renal hematoma/perinephric abscess -> Fusobacterium bacteremia noted - fluid culture from 10/13 s/p IR-guided drainage growing Enterobacter/Enterococcus -> see antibiotic regimen below

## 2021-11-10 NOTE — PLAN OF CARE
Problem: PHYSICAL THERAPY ADULT  Goal: Performs mobility at highest level of function for planned discharge setting  See evaluation for individualized goals  Description: Treatment/Interventions: Patient/family training, LE strengthening/ROM, Bed mobility, Spoke to nursing, Spoke to case management, OT  Equipment Recommended:  (TBD )       See flowsheet documentation for full assessment, interventions and recommendations  Outcome: Progressing  Note: Prognosis: Fair  Problem List: Decreased strength,Decreased endurance,Impaired balance,Decreased mobility,Decreased skin integrity,Obesity  Assessment: Pt seen for PT treatment session this date  Therapy session focused on sitting static/ dynamic sitting tolerance/ balance, functional transfers, gait training, therapeutic exercise and endurance training in order to improve overall mobility and independence  Pt requires assist of 2 for all mobility performed this date  Pt with improved sitting balance/ tolerance compared to previous session; requires supervision level when performing static/ dynamic tasks  Pt performed multiple STS from EOB/ bedside chair with mod Ax2; cues for hand placement and sequencing  Pt ambulated short distances with RW and mod Ax2; cues for foot placement, sequencing and RW management  Slight R knee buckling noted with ambulation  Pt performed/ tolerated seated db/l LE therex to hips, knees and ankles with no c/o increased pain/ discomfort  Pt continues to have decreased activity tolerance requiring frequent rest breaks before, during and after all mobility task  Pt making slow but steady progress toward goals  Pt was left sitting upright in bedside chair with chair alarm o at the end of PT session with all needs in reach  Pt would benefit from continued PT services while in hospital to address remaining limitations  PT to continue to follow pt and recommends post-acute rehab services after d/c   The patient's AM-PAC Basic Mobility Inpatient Short Form Low Function Raw Score 16 , Standardized Score is 25 72  A standardized score less 42 9 suggests the patient may benefit from discharge to post-acute rehab services  Please also refer to the recommendation of the Physical Therapist for safe discharge planning  Barriers to Discharge: Inaccessible home environment,Decreased caregiver support        PT Discharge Recommendation: Post acute rehabilitation services     PT - OK to Discharge: Yes (to rehab once medically cleared )    See flowsheet documentation for full assessment

## 2021-11-10 NOTE — ASSESSMENT & PLAN NOTE
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

## 2021-11-10 NOTE — ASSESSMENT & PLAN NOTE
Blood cultures from 10/9 growing Fusobacterium - discontinued IV Zosyn regimen today  Repeat blood cultures 10/12 are negative  Appreciate Infectious Disease evaluation

## 2021-11-10 NOTE — PLAN OF CARE
Problem: OCCUPATIONAL THERAPY ADULT  Goal: Performs self-care activities at highest level of function for planned discharge setting  See evaluation for individualized goals  Description: Treatment Interventions: ADL retraining, Functional transfer training, UE strengthening/ROM, Endurance training, Patient/family training, Equipment evaluation/education, Compensatory technique education, Continued evaluation, Activityengagement, Energy conservation          See flowsheet documentation for full assessment, interventions and recommendations  Outcome: Progressing  Note: Limitation: Decreased ADL status,Decreased UE ROM,Decreased UE strength,Decreased endurance,Decreased self-care trans,Decreased high-level ADLs  Prognosis: Fair  Assessment: Patient participated in Skilled OT session this date with interventions consisting of ADL re training with the use of correct body mechnaics, Energy Conservation techniques, therapeutic exercise to: increase functional use of BUEs, increase BUE muscle strength ,  therapeutic activities to: increase activity tolerance, increase dynamic sit/ stand balance during functional activity  and increase OOB/ sitting tolerance   Upon arrival patient was found seated at edge of bed  Pt demonstrated the following tasks: MOD A x 2 STS, fnxl mobility with RW  Pt requires MOD A for LBD  Pt also participates in UE ROM, increased A to perform proximal ex  Pt with poor endurance/activity tolerance  Requires frequent rest breaks  Patient continues to be functioning below baseline level, occupational performance remains limited secondary to factors listed above and increased risk for falls and injury  From OT standpoint, recommendation at time of d/c would be STR  Patient to benefit from continued Occupational Therapy treatment while in the hospital to address deficits as defined above and maximize level of functional independence with ADLs and functional mobility   Pt was left in chair with alarm on after session with all current needs met  The patient's raw score on the AM-PAC Daily Activity inpatient short form is 15, standardized score is 34 69, less than 39 4  Patients at this level are likely to benefit from discharge to post-acute rehabilitation services  Please refer to the recommendation of the Occupational Therapist for safe discharge planning       OT Discharge Recommendation: Post acute rehabilitation services  OT - OK to Discharge: Yes (to rehab when medically stable)

## 2021-11-10 NOTE — ASSESSMENT & PLAN NOTE
With associated history of MDS  Follows outpatient hematology  Currently off a Jakafi regimen for polycythemia vera - undergoes Aranesp on every four weeks  Monitor CBC

## 2021-11-10 NOTE — PROGRESS NOTES
[facility] PROGRESS NOTE   Courtney Eubanks 77 y o  female MRN: [id number]  Unit/Bed#: [facility] 929-01 Encounter: [id number]  Reason for Consult: LUANN    ASSESSMENT and PLAN:  1  ESRD on HD (new start this admission)  · Admitted with a creatinine of 3 26 on 10/9/21  · Creatinine worsened to a peak of 6 78 on 10/20/21 and started HD on 10/20/21  · Now deemed ESRD and being dialyzed on a TTS schedule  · Next HD is tomorrow  · Awaiting outpatient HD placement  2  Chronic kidney disease, stage V:  · Baseline creatinine around 3 0 to 3 5  · Follows with Dr Elizabeth Roman     3  Access:  · L IJ permcath - placed on 10/20/21  · R arm AVF cannulated 11/9/21 with 15G needles  · Will continue using R arm AVF  · Plan to remove permcath if R arm AVF use is stable x 2 weeks  4  Hypertension:  · BP is at goal    · Continue metoprolol 25 mg BID  · No changes  5  Anemia:    · Hemoglobin is below goal but stable  · Not on JENELLE due to history of PE     6  Mineral bone disease:  · Continue calcitriol 0 25 mcg daily for 2HPT  · Continue renal diet for hyperphosphatemia  · Phos is at goal without binders  7  Sepsis due to infected left renal hematoma/perinephric abscess with chronic obstructive uropathy  · Repeat CT 11/8/21 - indicates improvement  · Now off abx       8  Fusobacterium bacteremia: Completed antibiotics  DISPOSITION:  · HD tomorrow  · Await outpatient HD placement  SUBJECTIVE / 24H INTERVAL HISTORY:  Eating okay  No CP or SOB  Tolerated HD yesterday       OBJECTIVE:  Current Weight: Weight - Scale: 82 kg (180 lb 12 4 oz) (11/4: post hemodialysis weight)  Vitals:    11/09/21 1108 11/09/21 1535 11/09/21 2106 11/10/21 0739   BP: 100/63 115/65 113/65 132/83   BP Location:       Pulse: 80  89 84   Resp: 16 18 18 18   Temp:  97 9 °F (36 6 °C) 98 5 °F (36 9 °C) (!) 97 2 °F (36 2 °C)   TempSrc:  Oral     SpO2:  93% 92% 96%   Weight:       Height:           Intake/Output Summary (Last 24 hours) at 11/10/2021 1004  Last data filed at 11/10/2021 0950  Gross per 24 hour   Intake 820 ml   Output 1613 ml   Net -793 ml   General: conscious, cooperative, no distress  Skin: dry  Eyes: pale conjunctivae  ENT: moist mucous membranes  Chest/Lungs: equal chest expansion, clear breath sounds  CVS: distinct heart sounds, normal rate, regular rhythm, no rub  Abdomen: soft, non tender, non distended, normal bowel sounds  Extremities: no edema  : no camp catheter  Neuro: awake, alert     Psych: appropriate affect    Medications:    Current Facility-Administered Medications:     acetaminophen (TYLENOL) tablet 650 mg, 650 mg, Oral, Q6H PRN, Claude Diaz MD, 650 mg at 11/03/21 1613    apixaban (ELIQUIS) tablet 2 5 mg, 2 5 mg, Oral, BID, Levonne Cezar, DO, 2 5 mg at 11/10/21 0827    atorvastatin (LIPITOR) tablet 40 mg, 40 mg, Oral, HS, Claude Diaz MD, 40 mg at 11/09/21 2108    calcitriol (ROCALTROL) capsule 0 25 mcg, 0 25 mcg, Oral, Daily, Claude Diaz MD, 0 25 mcg at 11/10/21 0827    cholecalciferol (VITAMIN D3) tablet 800 Units, 800 Units, Oral, Daily, Claude Diaz MD, 800 Units at 11/10/21 0827    citalopram (CeleXA) tablet 20 mg, 20 mg, Oral, Daily, Claude Diaz MD, 20 mg at 11/10/21 0827    levothyroxine tablet 75 mcg, 75 mcg, Oral, Daily, Claude Diaz MD, 75 mcg at 11/10/21 0827    loperamide (IMODIUM) capsule 2 mg, 2 mg, Oral, 4x Daily PRN, Sherolyn Duverney, PA-C, 2 mg at 11/09/21 1255    melatonin tablet 3 mg, 3 mg, Oral, HS, Claude Diaz MD, 3 mg at 11/09/21 2108    metoprolol (LOPRESSOR) injection 2 5 mg, 2 5 mg, Intravenous, Q6H PRN, Sherolyn Duverney, PA-C, 2 5 mg at 10/20/21 1618    metoprolol tartrate (LOPRESSOR) tablet 25 mg, 25 mg, Oral, BID, Sherolyn Duverney, PA-C, 25 mg at 11/10/21 0828    ondansetron (ZOFRAN) injection 4 mg, 4 mg, Intravenous, Q6H PRN, Claude Diaz MD, 4 mg at 10/14/21 1616    pantoprazole (PROTONIX) EC tablet 40 mg, 40 mg, Oral, BID AC, Mumtaz Laguerre, DO, 40 mg at 11/10/21 0542    saccharomyces boulardii (FLORASTOR) capsule 250 mg, 250 mg, Oral, Daily, Portillo Peng MD, 250 mg at 11/10/21 0827    Laboratory Results:  Results from last 7 days   Lab Units 11/10/21  0455 11/09/21  0531 11/08/21  0458 11/06/21  0708 11/05/21  0540 11/04/21  0531   WBC Thousand/uL 5 54 4 95 5 11 4 91 4 78 4 71   HEMOGLOBIN g/dL 7 9* 7 8* 7 7* 7 6* 7 9* 8 2*   HEMATOCRIT % 25 3* 25 4* 24 7* 24 5* 25 1* 25 6*   PLATELETS Thousands/uL 185 193 177 192 200 213   POTASSIUM mmol/L 3 6 3 4* 3 7 3 5 3 1* 2 9*   CHLORIDE mmol/L 102 105 104 106 101 101   CO2 mmol/L 27 25 28 27 27 28   BUN mg/dL 13 26* 19 16 11 17   CREATININE mg/dL 3 51* 5 23* 4 40* 4 29* 2 95* 4 01*   CALCIUM mg/dL 8 4 8 6 8 7 8 3 8 3 8 2*   PHOSPHORUS mg/dL  --   --   --  2 9  --  3 4

## 2021-11-11 ENCOUNTER — APPOINTMENT (INPATIENT)
Dept: DIALYSIS | Facility: HOSPITAL | Age: 76
DRG: 981 | End: 2021-11-11
Payer: COMMERCIAL

## 2021-11-11 LAB
ANION GAP SERPL CALCULATED.3IONS-SCNC: 9 MMOL/L (ref 4–13)
BASOPHILS # BLD AUTO: 0.04 THOUSANDS/ΜL (ref 0–0.1)
BASOPHILS NFR BLD AUTO: 1 % (ref 0–1)
BUN SERPL-MCNC: 21 MG/DL (ref 5–25)
CALCIUM SERPL-MCNC: 8.4 MG/DL (ref 8.3–10.1)
CHLORIDE SERPL-SCNC: 102 MMOL/L (ref 100–108)
CO2 SERPL-SCNC: 29 MMOL/L (ref 21–32)
CREAT SERPL-MCNC: 4.55 MG/DL (ref 0.6–1.3)
EOSINOPHIL # BLD AUTO: 0.06 THOUSAND/ΜL (ref 0–0.61)
EOSINOPHIL NFR BLD AUTO: 1 % (ref 0–6)
ERYTHROCYTE [DISTWIDTH] IN BLOOD BY AUTOMATED COUNT: 17.9 % (ref 11.6–15.1)
GFR SERPL CREATININE-BSD FRML MDRD: 9 ML/MIN/1.73SQ M
GLUCOSE SERPL-MCNC: 85 MG/DL (ref 65–140)
HCT VFR BLD AUTO: 24.8 % (ref 34.8–46.1)
HGB BLD-MCNC: 7.8 G/DL (ref 11.5–15.4)
IMM GRANULOCYTES # BLD AUTO: 0.09 THOUSAND/UL (ref 0–0.2)
IMM GRANULOCYTES NFR BLD AUTO: 2 % (ref 0–2)
LYMPHOCYTES # BLD AUTO: 0.88 THOUSANDS/ΜL (ref 0.6–4.47)
LYMPHOCYTES NFR BLD AUTO: 17 % (ref 14–44)
MCH RBC QN AUTO: 29.4 PG (ref 26.8–34.3)
MCHC RBC AUTO-ENTMCNC: 31.5 G/DL (ref 31.4–37.4)
MCV RBC AUTO: 94 FL (ref 82–98)
MONOCYTES # BLD AUTO: 0.45 THOUSAND/ΜL (ref 0.17–1.22)
MONOCYTES NFR BLD AUTO: 9 % (ref 4–12)
NEUTROPHILS # BLD AUTO: 3.71 THOUSANDS/ΜL (ref 1.85–7.62)
NEUTS SEG NFR BLD AUTO: 70 % (ref 43–75)
NRBC BLD AUTO-RTO: 0 /100 WBCS
PLATELET # BLD AUTO: 180 THOUSANDS/UL (ref 149–390)
PMV BLD AUTO: 10.1 FL (ref 8.9–12.7)
POTASSIUM SERPL-SCNC: 3.5 MMOL/L (ref 3.5–5.3)
RBC # BLD AUTO: 2.65 MILLION/UL (ref 3.81–5.12)
SODIUM SERPL-SCNC: 140 MMOL/L (ref 136–145)
WBC # BLD AUTO: 5.23 THOUSAND/UL (ref 4.31–10.16)

## 2021-11-11 PROCEDURE — 97535 SELF CARE MNGMENT TRAINING: CPT

## 2021-11-11 PROCEDURE — 85025 COMPLETE CBC W/AUTO DIFF WBC: CPT | Performed by: INTERNAL MEDICINE

## 2021-11-11 PROCEDURE — 97530 THERAPEUTIC ACTIVITIES: CPT

## 2021-11-11 PROCEDURE — 99232 SBSQ HOSP IP/OBS MODERATE 35: CPT | Performed by: INTERNAL MEDICINE

## 2021-11-11 PROCEDURE — 90935 HEMODIALYSIS ONE EVALUATION: CPT | Performed by: INTERNAL MEDICINE

## 2021-11-11 PROCEDURE — 80048 BASIC METABOLIC PNL TOTAL CA: CPT | Performed by: INTERNAL MEDICINE

## 2021-11-11 RX ORDER — MIDODRINE HYDROCHLORIDE 5 MG/1
10 TABLET ORAL DAILY PRN
Status: DISCONTINUED | OUTPATIENT
Start: 2021-11-11 | End: 2021-11-13 | Stop reason: HOSPADM

## 2021-11-11 RX ADMIN — Medication 250 MG: at 08:22

## 2021-11-11 RX ADMIN — APIXABAN 2.5 MG: 2.5 TABLET, FILM COATED ORAL at 08:22

## 2021-11-11 RX ADMIN — CALCITRIOL 0.25 MCG: 0.25 CAPSULE, LIQUID FILLED ORAL at 08:22

## 2021-11-11 RX ADMIN — CITALOPRAM HYDROBROMIDE 20 MG: 20 TABLET ORAL at 08:22

## 2021-11-11 RX ADMIN — CHOLECALCIFEROL TAB 10 MCG (400 UNIT) 800 UNITS: 10 TAB at 08:22

## 2021-11-11 RX ADMIN — PANTOPRAZOLE SODIUM 40 MG: 40 TABLET, DELAYED RELEASE ORAL at 16:38

## 2021-11-11 RX ADMIN — PANTOPRAZOLE SODIUM 40 MG: 40 TABLET, DELAYED RELEASE ORAL at 05:58

## 2021-11-11 RX ADMIN — APIXABAN 2.5 MG: 2.5 TABLET, FILM COATED ORAL at 16:38

## 2021-11-11 RX ADMIN — LEVOTHYROXINE SODIUM 75 MCG: 75 TABLET ORAL at 08:22

## 2021-11-11 RX ADMIN — METOPROLOL TARTRATE 25 MG: 25 TABLET, FILM COATED ORAL at 08:22

## 2021-11-11 RX ADMIN — MELATONIN TAB 3 MG 3 MG: 3 TAB at 22:49

## 2021-11-11 RX ADMIN — ATORVASTATIN CALCIUM 40 MG: 40 TABLET, FILM COATED ORAL at 22:49

## 2021-11-11 NOTE — PLAN OF CARE
Post-Dialysis RN Treatment Note    Blood Pressure:  Pre 118/68 mm/Hg  Post 115/62 mmHg   EDW  TBD kg    Weight:  Pre 79 8 kg   Post 78 5 kg   Mode of weight measurement: Bed Scale   Volume Removed 1500 ml    Treatment duration 210minutes    NS given no   Treatment shortened? No   Medications given during Rx None Reported   Estimated Kt/V  1 56   Access type: AV fistula   Access Status: Yes, describe:  , (2) 15 g needles without any problems    Report called to primary nurse   Yes Analilia Napier RN  Plan 3 5 hrs of Hemodialysis with an UF of 1 5 kg as discussed with Dr Amanda Zuñiga     Problem: METABOLIC, FLUID AND ELECTROLYTES - ADULT  Goal: Electrolytes maintained within normal limits  Description: INTERVENTIONS:  - Monitor labs and assess patient for signs and symptoms of electrolyte imbalances  - Administer electrolyte replacement as ordered  - Monitor response to electrolyte replacements, including repeat lab results as appropriate  - Instruct patient on fluid and nutrition as appropriate  Outcome: Progressing  Goal: Fluid balance maintained  Description: INTERVENTIONS:  - Monitor labs   - Monitor I/O and WT  - Instruct patient on fluid and nutrition as appropriate  - Assess for signs & symptoms of volume excess or deficit  Outcome: Progressing

## 2021-11-11 NOTE — CASE MANAGEMENT
Case Management Discharge Planning Note    Patient name Zoya Wong  Location [facility] 929/[facility] 929-01 MRN [id number]  : 1945 Date 2021       Current Admission Date: 10/9/2021  Current Admission Diagnosis:Sepsis on admission   Patient Active Problem List    Diagnosis Date Noted    Hypercalcemia 2021    Hypothyroidism 10/19/2021    Bacteremia 10/19/2021    Acute renal failure (HCC)     Pleural effusion 10/10/2021    Swelling of right upper extremity 10/10/2021    Perinephric abscess 10/09/2021    Iron deficiency anemia due to chronic blood loss 2021    Poor venous access 2021    Ambulatory dysfunction 2021    Stage 5 chronic kidney disease not on chronic dialysis ([facility] ) 2021    Edema of right upper extremity 2021    Febrile illness 07/15/2021    COVID-19 07/15/2021    Obesity, morbid ([facility] ) 2021    Right upper quadrant cyst 2021    Obstructive left pyelonephritis 2021    Constipation 2021    Anemia 2021    Gross hematuria 2021    Hydronephrosis of left kidney 2021    Hyperlipemia 2021    Class 2 severe obesity due to excess calories with serious comorbidity and body mass index (BMI) of 35 0 to 35 9 in adult [facility]) 2021    Chronic thrombosis of subclavian vein ([facility] )     History of Clostridioides difficile colitis 2020    History of shingles 2020    Depression 2020    Polycythemia 2020    Inverted T wave 07/15/2020    Benign neoplasm of supratentorial region of brain ([address] ) 2019    Meningioma ([facility] ) 2019    Thoracic compression fracture ([facility] ) 2019    Sepsis on admission 2019    Acute renal failure superimposed on CKD stage 5     Intraventricular hemorrhage ([address] ) 2019    Diarrhea 2019    Polycythemia vera  10/23/2018    Essential hypertension 10/06/2018    Secondary hyperparathyroidism of renal origin ([address] ) 10/05/2018  Obstructive uropathy 05/17/2018    Small bowel obstruction ([address] ) 02/09/2018    Anemia of chronic disease 07/07/2017    History of pulmonary embolism 10/07/2016    Bladder mass 10/07/2016    Mass of urethra 09/02/2015      LOS (days): 33  Geometric Mean LOS (GMLOS) (days): 8 40  Days to GMLOS:-24 5     OBJECTIVE:  Risk of Unplanned Readmission Score: 36         Current admission status: Inpatient   Preferred Pharmacy:   [id number] [address] [place] [address]  Phone: [phone] Fax: [phone]    [facility] [address], [place] [address]  Phone: [phone] Fax: [address] [facility], [facility] [address] [facility] [address] [place] [address]  Phone: [phone] Fax: [phone]    [id number] [place] [address]  Phone: [phone] Fax: [phone]    Primary Care Provider: Bryon Aragon MD    Primary Insurance: [facility]  Secondary Insurance:     DISCHARGE DETAILS:     CM faxed dialysis referral to [facility] Admissions for review

## 2021-11-11 NOTE — PLAN OF CARE
Problem: OCCUPATIONAL THERAPY ADULT  Goal: Performs self-care activities at highest level of function for planned discharge setting  See evaluation for individualized goals  Description: Treatment Interventions: ADL retraining, Functional transfer training, UE strengthening/ROM, Endurance training, Patient/family training, Equipment evaluation/education, Compensatory technique education, Continued evaluation, Activityengagement, Energy conservation          See flowsheet documentation for full assessment, interventions and recommendations  Outcome: Progressing  Note: Limitation: Decreased ADL status,Decreased UE ROM,Decreased UE strength,Decreased endurance,Decreased self-care trans,Decreased high-level ADLs  Prognosis: Fair  Assessment: Pt seen on this day for OT session focusing on ADL retraining, bed mobility, sitting balance, sitting tolerance, standing tolerance, functional transfers to increase ability to participate in ADLs/self-care and functional tasks  Pt received supine in bed, using bed pan  This writer and CNA assisted pt with toileting hygiene following BM  Pt req maxA for toileting hygiene, SUP for rolling to L and R sides with VC for hand placement and task intiation during toileting  Pt req modA for UB bathing in supine, maxA for LB bathing in supine  Pt req modA to complete supine > sit EOB transferincluding VC and assist at shoulders  Pt req maxA for scooting toward EOB once seated, fair- static sitting balance with 1 lateral LOB, corrected by this writer  Pt completed STS transfer from EOB with Nathan x 2 using RW  Pt incontinent of bowel when standing  Pot tolerated standing approx 1-2 min while maxA for hygiene following BM  Pt completed SPT from EOB to recliner with minAx2, requiring increased time and increased encouragement to complete   Pt attempted to doff socks while seated in recliner, unable to do, and stating "I'm not going to be able to get them on either," maxA for donning/doffing socks  Pt continues to present with ADL deficits, functional mobility deficits, bed mobility deficits, balance deficits, functional reach deficits, and decreased functional activity tolerance  Pt will benefit from continued acute skilled OT during hospitalization/ OT recommendation is for STR upon d/c  The patient's raw score on the AM-PAC Daily Activity inpatient short form is 14, standardized score is 33 39, less than 39 4  Patients at this level are likely to benefit from discharge to post-acute rehabilitation services  Please refer to the recommendation of the Occupational Therapist for safe discharge planning  OT Discharge Recommendation: Post acute rehabilitation services  OT - [place] to Discharge:  Yes

## 2021-11-11 NOTE — PROGRESS NOTES
Progress Note - Infectious Disease   Jhon Meza 77 y o  female MRN: [id number]  Unit/Bed#: [facility] 929-01 Encounter: [id number]      Impression/Plan:  1  Sepsis, POA   Patient presented with progressing flank/abdominal discomfort likely related to problem 3  Clinical parameters had improved  Overall poor progress likely due to 4  Tachycardia/borderline blood pressures 10/28, antibiotics restarted given 3  Currently stable   Patient has completed almost 2 weeks of intravenous Zosyn with more than a week completed since the time of the drainage  Monitor off all antibiotics  Supportive care     2  Fusobacterium bacteremia   Source unknown, possibly transient given 1 or occult GI source  CT imaging of the abdomen on admission unremarkable  Repeat imaging with new collections noted in the abdomen and partial SBO, potential source   Drains placed   Patient without any teeth  Repeat blood cultures without growth   Not isolated on fluid cultures  No prior C-scope on chart review   EGD unremarkable  Imaging of the right upper quadrant ultrasound unremarkable   Completed empiric course of Flagyl    No further antibiotics for this issue  GI follow-up  C scope once more stable/outpatient     3  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   Patient has a chronic obstructive uropathy involving the left side and recently had PCN removal   Subsequently developed bleeding at the site and hematoma on imaging likely due to chronic anticoagulation   IR aspiration and drain placement on 10/13 and earlier urine cultures with same organisms   Drains likely provided significant source control   IR drain check noted one tube dislodged   Repeat CT scan with persistent collections, uncertain if these are sterile   Status post repeat aspiration with cultures   With changes in vitals on 10/28, antibiotics restarted   Repeat body fluid cultures negative   Repeat CT scan with resolution of perinephric abscess, and other collection in the gutter   Decreased collection in the cul-de-sac  Patient has completed more than a week of antibiotics from the time of the drain placement  Drains has been removed  Monitor off all antibiotics  Supportive care     4  Adrian Rich on CKD with fistula  Patient with baseline chronic kidney disease with fistula created in the right upper extremity   Stenosis causing swelling  Kidney function worsened over entire admission   Initiated on dialysis  Nephrology follow-up  Hemodialysis  Vascular surgery follow-up/imaging     5  History of C diff and diarrhea   Patient had a history of C diff in 2019  She has had repeat PCRs in July 2021 and now negative despite recent diarrhea  Diarrhea is now chronic  Being controlled Imodium  No additional antibiotics for now  Monitor stool output  No C diff prophylaxis for now     6  Polycythemia vera and MDS   Recent drop in hemoglobin and leukopenia likely related  Ongoing management/supportive care as per primary   Recommend follow-up with Oncology as outpatient      7  Prior PE on anticoagulation   Likely risk factor for issues as above  AC as per primary        8  Transaminitis   Patient noted to have slowly increasing alkaline phosphatase and AST   In the setting of this bacteremia consider occult abscess  Now downtrending/stable   Liver/RUQ ultrasound unremarkable   LFTs are much improved  Antibiotics as above  GI follow-up     9  Melena   Possible upper GI bleed   Ongoing follow-up and workup as per GI   EGD reportedly unremarkable   Additional care as per primary      Have discussed the above management plan with the primary service    Antibiotics:  Status post 13 days of Zosyn  Post drain day 10    Subjective:  Patient has no fever, chills, sweats; no nausea, vomiting, diarrhea; no cough, shortness of breath; no pain  No new symptoms      Objective:  Vitals:  Temp:  [98 °F (36 7 °C)-98 6 °F (37 °C)] 98 1 °F (36 7 °C)  HR:  [77-88] 77  Resp:  [17-20] 18  BP: (112-128)/(64-81) 112/64  SpO2:  [93 %-95 %] 95 %  Temp (24hrs), Av 3 °F (36 8 °C), Min:98 °F (36 7 °C), Max:98 6 °F (37 °C)  Current: Temperature: 98 1 °F (36 7 °C)    Physical Exam:   General Appearance:  Alert, interactive, nontoxic, no acute distress  Throat: Oropharynx moist without lesions  Lungs:   Clear to auscultation bilaterally; no wheezes, rhonchi or rales; respirations unlabored   Heart:  RRR; no murmur, rub or gallop   Abdomen:   Soft, non-tender, non-distended, positive bowel sounds  Extremities: No clubbing, cyanosis or edema   Skin: No new rashes or lesions  No draining wounds noted  Labs, Imaging, & Other studies:   All pertinent labs and imaging studies were personally reviewed  Results from last 7 days   Lab Units 215 11/10/21  0455 21  0531   WBC Thousand/uL 5 23 5 54 4 95   HEMOGLOBIN g/dL 7 8* 7 9* 7 8*   PLATELETS Thousands/uL 180 185 193     Results from last 7 days   Lab Units 21  0435 11/10/21  0455 11/10/21  0455 21  0531 21  0531 21  0708 21  0540   SODIUM mmol/L 140  --  141  --  138   < > 136   POTASSIUM mmol/L 3 5   < > 3 6   < > 3 4*   < > 3 1*   CHLORIDE mmol/L 102   < > 102   < > 105   < > 101   CO2 mmol/L 29   < > 27   < > 25   < > 27   BUN mg/dL 21   < > 13   < > 26*   < > 11   CREATININE mg/dL 4 55*   < > 3 51*   < > 5 23*   < > 2 95*   EGFR ml/min/1 73sq m 9   < > 12   < > 7   < > 15   CALCIUM mg/dL 8 4   < > 8 4   < > 8 6   < > 8 3   AST U/L  --   --   --   --   --   --  25   ALT U/L  --   --   --   --   --   --  7*   ALK PHOS U/L  --   --   --   --   --   --  200*    < > = values in this interval not displayed

## 2021-11-11 NOTE — PROGRESS NOTES
Follow up Consultation    Nephrology   Wilfredo Sawant 77 y o  female MRN: [id number]  Unit/Bed#: [facility] 929-01 Encounter: [id number]      Physician Requesting Consult: Georgie Patel MD        ASSESSMENT/PLAN:      Gricelda Lo ESRD:   - gets usual dialysis on TTS schedule at this time  - started on dialysis this admission  And deemed ESRD  - access:  Right AV fistula, left IJ PermCath  - PermCath was placed on 10/20/2021  - right arm AV fistula cannulated 11/09/2021 with 15 gauge needles  - plan is to remove PermCath if right arm AV fistula uses stable for 2 weeks from start date  - creatinine had peaked at 6 78 mg/dL on 10/20/2021 and patient was started on dialysis on 10/20/2021   - estimated dry weight:  To be determined  - last dialysis treatment on 11/11/2021  - next dialysis treatment on 11/13/2021  - check BMP, phosphorus with dialysis  - renal diet when allowed diet order  - avoid nephrotoxins, adjust meds to appropriate GFR  - awaiting outpatient dialysis placement  [place] with discharge likely to sniff  - had history of CKD stage 5 with baseline creatinine 3-3 5 mg/dL and was following outpatient with Dr monterroso     H&H/anemia:  - most recent hemoglobin at 7 8 grams/deciliter  - maintain hemoglobin 10-12 grams/deciliter  - Recommendations:  Avoid JENELLE due to history of PE  Holding off on Venofer due to elevated ferritin levels and infection recent   Acid-base electrolytes:  o Sodium, phosphorus, potassium, acidosis to be corrected with dialysis     Blood pressure/hypertension:   o Current medications:  Metoprolol 25 mg p o  B i d   o Recommendations:  Continue for now  increase UF with HD, place on midodrine 10 mg p o  P r n  If SBP less than 90     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:   o Recommendations:  Continue calcitriol 0 25 mcg p o   Q day  o Follow-up intact PTH as an outpatient     Other medical problems:  o Sepsis due to infected left renal hematoma/perinephric abscess was chronic obstructive uropathy:  Management primary team, follow-up with ID  Repeat CT21 showing improvements now off of antibiotics  o Use of bacterium bacteremia:  Management per primary team   Follow-up with ID completed antibiotics        Thanks for the consult  Will continue to follow  Please call with questions  Above-mentioned orders and Plan in terms of dialysis was discussed with the team in [address] NADIR Meier MD, [facility], 12:09 PM        Objective :  Patient seen and examined while on hemodialysis at 9:30 a m  Hemodynamically stable tolerating dialysis well will aim for UF close to 1 5 kilos  Getting dialysis via right AV fistula no issues with flows at this time  PHYSICAL EXAM  /63 (BP Location: Left arm)   Pulse 72   Temp 98 1 °F (36 7 °C) (Oral)   Resp 16   Ht 5' (1 524 m)   Wt 82 kg (180 lb 12 4 oz) Comment: : post hemodialysis weight  SpO2 95%   BMI 35 31 kg/m²   Temp (24hrs), Av 3 °F (36 8 °C), Min:98 °F (36 7 °C), Max:98 6 °F (37 °C)        Intake/Output Summary (Last 24 hours) at 2021 1209  Last data filed at 2021 0942  Gross per 24 hour   Intake 440 ml   Output 50 ml   Net 390 ml       I/O last 24 hours: In: 560 [P O :360; I V :200]  Out: 50 [Urine:50]      Current Weight: Weight - Scale: 82 kg (180 lb 12 4 oz) (: post hemodialysis weight)  First Weight: Weight - Scale: 86 5 kg (190 lb 11 2 oz)  Physical Exam  Vitals and nursing note reviewed  Constitutional:       General: She is not in acute distress  Appearance: Normal appearance  She is obese  She is ill-appearing  She is not toxic-appearing or diaphoretic  HENT:      Head: Normocephalic and atraumatic  Mouth/Throat:      Mouth: Mucous membranes are moist       Pharynx: Oropharynx is clear  No oropharyngeal exudate  Eyes:      General: No scleral icterus       Conjunctiva/sclera: Conjunctivae normal    Neck:      Comments: Left IJ PermCath  Cardiovascular:      Rate and Rhythm: Normal rate  Heart sounds: Normal heart sounds  No friction rub  Pulmonary:      Effort: Pulmonary effort is normal  No respiratory distress  Breath sounds: Normal breath sounds  No wheezing  Abdominal:      General: There is no distension  Palpations: Abdomen is soft  There is no mass  Tenderness: There is no abdominal tenderness  Musculoskeletal:         General: Swelling present  Comments: Trace edema bilateral extremities 1+ edema right upper extremity weeping, right arm AV fistula   Skin:     General: Skin is warm  Coloration: Skin is not jaundiced  Neurological:      General: No focal deficit present  Mental Status: She is alert and oriented to person, place, and time  Psychiatric:         Mood and Affect: Mood normal          Behavior: Behavior normal              Review of Systems   Constitutional: Positive for fatigue  Negative for chills and fever  HENT: Negative for congestion  Respiratory: Negative for cough, shortness of breath and wheezing  Cardiovascular: Negative for leg swelling  Gastrointestinal: Negative for abdominal pain, constipation, diarrhea, nausea and vomiting  Musculoskeletal: Negative for back pain  Skin: Negative for rash  Neurological: Negative for headaches  Psychiatric/Behavioral: Negative for agitation  All other systems reviewed and are negative        Scheduled Meds:  Current Facility-Administered Medications   Medication Dose Route Frequency Provider Last Rate    acetaminophen  650 mg Oral Q6H PRN Naya Russell MD      apixaban  2 5 mg Oral BID Brunilda Dutta DO      atorvastatin  40 mg Oral HS Naya Russell MD      calcitriol  0 25 mcg Oral Daily Naya Russell MD      cholecalciferol  800 Units Oral Daily Naya Russell MD      citalopram  20 mg Oral Daily Naya Russell MD      levothyroxine  75 mcg Oral Daily Naya Russell MD     [facility] loperamide  2 mg Oral 4x Daily PRN Aura Amaral PA-C      melatonin  3 mg Oral HS Jenniffer Bragg MD      metoprolol  2 5 mg Intravenous Q6H PRN Aura Amaral PA-C      metoprolol tartrate  25 mg Oral BID Aura Amaral PA-C      ondansetron  4 mg Intravenous Q6H PRN Jenniffer Bragg MD      pantoprazole  40 mg Oral BID AC Mumtaz Quispemilagro,       saccharomyces boulardii  250 mg Oral Daily Jenniffer Bragg MD         PRN Meds:   acetaminophen    loperamide    metoprolol    ondansetron    Continuous Infusions:       Invasive Devices: Invasive Devices  Report    Peripherally Inserted Central Catheter Line            PICC Line 10/11/21 30 days          Line            Hemodialysis AV Fistula 09/30/21 Right Upper arm 42 days          Hemodialysis Catheter            HD Permanent Double Catheter 21 days          Drain            Urostomy Ileal conduit RUQ 1863 days                  LABORATORY:    Results from last 7 days   Lab Units 11/11/21  0435 11/10/21  0455 11/09/21  0531 11/08/21  0458 11/06/21  0708 11/05/21  0540   WBC Thousand/uL 5 23 5 54 4 95 5 11 4 91 4 78   HEMOGLOBIN g/dL 7 8* 7 9* 7 8* 7 7* 7 6* 7 9*   HEMATOCRIT % 24 8* 25 3* 25 4* 24 7* 24 5* 25 1*   PLATELETS Thousands/uL 180 185 193 177 192 200   POTASSIUM mmol/L 3 5 3 6 3 4* 3 7 3 5 3 1*   CHLORIDE mmol/L 102 102 105 104 106 101   CO2 mmol/L 29 27 25 28 27 27   BUN mg/dL 21 13 26* 19 16 11   CREATININE mg/dL 4 55* 3 51* 5 23* 4 40* 4 29* 2 95*   CALCIUM mg/dL 8 4 8 4 8 6 8 7 8 3 8 3   PHOSPHORUS mg/dL  --   --   --   --  2 9  --       rest all reviewed    RADIOLOGY:  CT abdomen pelvis wo contrast   Final Result by Awais Fischer MD (11/08 2205)      Complete interval collapse of subcapsular left perinephric abscess in which a pigtail drainage catheter had been placed        Complete resolution of abscesses along the anterior margin of the peritoneal wall in the left paracolic gutter surrounding a pigtail drainage catheter  Decreasing size of loculated probable abscess in the posterior cul-de-sac and in the central pelvis  Again noted is extensive bilateral renal scarring more severe on the left than on the right  No hydronephrosis  Left pleural effusion  Cardiomegaly  Lobulated cyst in the upper lateral right hemipelvis possibly representing seroma unchanged over multiple prior examinations  Workstation performed: [id number]         IR drainage tube placement   Final Result by Jody Rooney MD (11/01 2227)   Impression: Successful placement of a therapeutic 10 [place]ian all-purpose drainage catheter into the left subcapsular/perinephric abscess collection  A total of 5 cc of pus was aspirated and a specimen sent to the laboratory as described  Workstation performed: [id number]         CT abdomen pelvis wo contrast   Final Result by Jimmy Montoya MD (10/27 1839)      Persistent left perinephric collection with a surrounding rind suspicious for perinephric abscess  Resolved left paracolic gutter collection; a pigtail catheter is in place  Thickened rind surrounding a pelvic posterior cul-de-sac collection  Unchanged thin-walled right iliac fossa collection  The study was marked in [facility] for immediate notification  Workstation performed: [id number]         IR AV fistulagram/graftogram   Final Result by Mary Grace Saleh MD (10/27 1722)   Impression:       Recurrent subclavian vein stenosis dilated with 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 with 50% residual narrowing  The patient may require a stent in the future after removal of the central venous catheter  Successful angioplasty of a mid basilic vein stenosis  Workstation performed: [id number]         IR drainage tube check/change/reposition/reinsertion/upsize   Final Result by Mary Grace Saleh MD (10/27 1712)   Impression:       Successful exchange of the perinephric drain        The more anterior drain was dislodged and tract was occluded  Workstation performed: Mireya [place] right upper quadrant   Final Result by John Smith MD (10/22 4394)      Nonobstructive right renal calculi  Otherwise unremarkable sonographic study  Please note CT of 10/13/2021 demonstrated a left kidney subcapsular collection and left abdominal and right lower quadrant collection  Workstation performed: [id number]         IR tunneled dialysis catheter placement   Final Result by Meek Wong MD (10/21 4568)   Impression:      Successful placement of a left internal jugular vein 32 cm tunneled dialysis catheter  Workstation performed: [id number]         US kidney and bladder   Final Result by Paradise Muhammad MD (10/20 8627)      1  Mild right upper pole caliectasis, difficult to compare to prior CT, but likely present to some degree on that study  Multiple nonobstructing right renal calculi  2   Difficult visualization of the left kidney due to body habitus  No gross hydronephrosis  Partially visualized left perinephric drainage catheter with likely some residual perinephric collection, noting poor evaluation  3   Unchanged 7 6 cm simple right lower quadrant fluid collection and fluid collection versus free fluid in the cul-de-sac, as seen on prior CT  Workstation performed: [id number]         VAS hemodialysis access duplex right upper limb avf   Final Result by Tamera Neal MD (10/20 2209)      XR chest portable   Final Result by Meek Wong MD (10/18 6979)      Persistent left pleural disease                    Workstation performed: [id number]         IR drainage tube placement   Final Result by Angelique Grissom MD (10/13 2041)   Impression:       Image guided 10 [place]ik drain placement into a left flank/paracolic gutter collection       Image guided 10 [place] drain placement into a left perinephric collection         Workstation performed: [id number]         CT chest abdomen pelvis wo contrast   Final Result by Neysa Cushing, MD (10/13 1503)      There is a increasing dilation of the small bowel loops in the upper to mid abdomen with the zone of transition suggest partial small bowel obstruction       New loculated fluid collection in the left paracolic gutter measuring 6 8 x 3 6 x 12 6 cm      Additional loculated fluid collection within the pelvic cul-de-sac, stable   Previously noted the postoperative right iliac fossa collection likely related to suggest lymphocele/seroma, stable      Retrograde the left nephroureteral stent with resolution of the left hydronephrosis      Subcapsular fluid collection left kidney, moderate on previous study      No new collection with the air-fluid level      No airspace consolidations lungs   Area of groundglass density seen in the posterior aspect of the left upper lobe may be due to atelectasis/pneumonitis       I personally discussed this study with Ronak Dunlap on 10/13/2021 at 2:29 PM                Workstation performed: [id number]         IR IN-Patient Thoracentesis   Final Result by Clifton Waite MD (10/12 2214)   Impression:      Ultrasound-guided left thoracentesis      Small effusion which was difficult to aspirate  Workstation performed: [id number]         IR non-tunneled central line placement   Final Result by Clifton Waite MD (10/12 2216)   Impression:       Image guided placement of a nontunneled 5 Belarusian double-lumen power injectable central venous catheter via the right external jugular vein                                Workstation performed: [id number]         VAS hemodialysis access duplex right upper limb avf   Final Result by Mena Ponce MD (10/11 1031)      CT abdomen pelvis wo contrast   Final Result by Slime Hinojosa MD (10/09 1625)      In the left renal fossa, there is a collection of mixed intermediate and high attenuation suggesting a hematoma with some bubbles of gas  Pigtail catheter is noted medial to the kidney  The renal pelvis may be collapsed  There appears to be some    hemorrhage and thickening extending in the combined interfascial plane of the retroperitoneum as well as some high density fluid within the pelvis suggesting hemoperitoneum  Superinfection of the kidney is possible  Urology evaluation is advised  Nonobstructing calculi in the right kidney  Some prominent loops of bowel are more likely large bowel suggesting ileus  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  This was discussed with Dr Isabella Baltazar at 4:20 PM               Workstation performed: [id number]         XR chest portable   Final Result by Govind Martin MD (10/10 3364)      Medial left base slight atelectasis and or small infiltrate  Small left effusion      The study was marked in [employer] for immediate notification  Workstation performed: [id number]         IR drainage tube check/change/reposition/reinsertion/upsize    (Results Pending)     Rest all reviewed    Portions of the record may have been created with voice recognition software  Occasional wrong word or "sound a like" substitutions may have occurred due to the inherent limitations of voice recognition software  Read the chart carefully and recognize, using context, where substitutions have occurred  If you have any questions, please contact the dictating provider

## 2021-11-11 NOTE — CASE MANAGEMENT
Case Management Discharge Planning Note    Patient name Genesis   Location [facility] 929/[facility] 929-01 MRN [id number]  : 1945 Date 2021       Current Admission Date: 10/9/2021  Current Admission Diagnosis:Sepsis on admission   Patient Active Problem List    Diagnosis Date Noted    Hypercalcemia 2021    Hypothyroidism 10/19/2021    Bacteremia 10/19/2021    Acute renal failure (HCC)     Pleural effusion 10/10/2021    Swelling of right upper extremity 10/10/2021    Perinephric abscess 10/09/2021    Iron deficiency anemia due to chronic blood loss 2021    Poor venous access 2021    Ambulatory dysfunction 2021    Stage 5 chronic kidney disease not on chronic dialysis ([facility] [address] ) 2021    Edema of right upper extremity 2021    Febrile illness 07/15/2021    COVID-19 07/15/2021    Obesity, morbid ([facility] [address] ) 2021    Right upper quadrant cyst 2021    Obstructive left pyelonephritis 2021    Constipation 2021    Anemia 2021    Gross hematuria 2021    Hydronephrosis of left kidney 2021    Hyperlipemia 2021    Class 2 severe obesity due to excess calories with serious comorbidity and body mass index (BMI) of 35 0 to 35 9 in adult [facility]) 2021    Chronic thrombosis of subclavian vein ([facility] [address] )     History of Clostridioides difficile colitis 2020    History of shingles 2020    Depression 2020    Polycythemia 2020    Inverted T wave 07/15/2020    Benign neoplasm of supratentorial region of brain ([address] ) 2019    Meningioma ([facility] [address] ) 2019    Thoracic compression fracture ([facility] [address] ) 2019    Sepsis on admission 2019    Acute renal failure superimposed on CKD stage 5     Intraventricular hemorrhage ([address] ) 2019    Diarrhea 2019    Polycythemia vera  10/23/2018    Essential hypertension 10/06/2018    Secondary hyperparathyroidism of renal origin ([address] ) 10/05/2018  Obstructive uropathy 05/17/2018    Small bowel obstruction ([address] ) 02/09/2018    Anemia of chronic disease 07/07/2017    History of pulmonary embolism 10/07/2016    Bladder mass 10/07/2016    Mass of urethra 09/02/2015      LOS (days): 33  Geometric Mean LOS (GMLOS) (days): 8 40  Days to GMLOS:-24 5     OBJECTIVE:  Risk of Unplanned Readmission Score: 36         Current admission status: Inpatient   Preferred Pharmacy:   [id number] [address] [place] [address]  Phone: [phone] Fax: [phone]    [facility] [address], [place] [address]  Phone: [phone] Fax: [address] [facility] [place] [address] [place] [address] [id number] [place] [address]  Phone: [phone] Fax: [phone]    [id number] [place]  [address] [place] [address]  Phone: [phone] Fax: [phone]    Primary Care Provider: Tam Jara MD    Primary Insurance: [facility]  Secondary Insurance:     DISCHARGE DETAILS:    CM called all the facilities in the [address] [place] [address] that did not respond through [facility]  CM left voicemails and some facilities stated that they were not in network or did not have a bed available  CM spoke to pt's son and notified him that we had two accepting facilities in the area  Per son he preferred a smaller facility and choose [facility]  MONIKA reached out to liaison who stated that they did not have a female unvaccinated bed available  Per Katina Lux there might be a a bed next week  CM will follow

## 2021-11-11 NOTE — OCCUPATIONAL THERAPY NOTE
Occupational Therapy Progress Note     Patient Name: Toni FISHER'E Date: 11/11/2021  Problem List  Principal Problem:    Sepsis on admission  Active Problems:    History of pulmonary embolism    Obstructive uropathy    Secondary hyperparathyroidism of renal origin [facility])    Essential hypertension    Polycythemia vera     Diarrhea    Anemia of chronic disease    Acute renal failure superimposed on CKD stage 5    Perinephric abscess    Pleural effusion    Swelling of right upper extremity    Hypothyroidism    Bacteremia          11/11/21 1445   OT Last Visit   OT Visit Date 11/11/21   Note Type   Note Type Treatment   Restrictions/Precautions   Weight Bearing Precautions Per Order No   Other Precautions Chair Alarm; Bed Alarm;Contact/isolation; Fall Risk   General   Response to Previous Treatment Patient with no complaints from previous session   Lifestyle   Autonomy PTA, pt reports being I with ADLs/IADLs, fnxl mobility, (+)    Reciprocal Relationships Neighbor   Service to Others Retired   Intrinsic Gratification Watching TV   Pain Assessment   Pain Assessment Tool 0-10   Pain Score No Pain   ADL   Where Assessed Supine, bed   UB Bathing Assistance 3  Moderate Assistance   UB Bathing Deficit Abdomen; Increased time to complete   LB Bathing Assistance 2  Maximal Assistance   LB Bathing Deficit Perineal area; Buttocks;Right upper leg;Left upper leg;Right lower leg including foot; Left lower leg including foot   LB Dressing Assistance 2  Maximal Assistance   LB Dressing Deficit Don/doff R sock; Don/doff L sock   LB Dressing Comments Pt stating "I'm not going to be able to do it", hesitant to attempt but unable to reach feet with increased encouragement   Toileting Assistance  2  Maximal Assistance   Toileting Deficit Perineal hygiene   Toileting Comments Pt incontinent of bowel while in supine and standing, req maxA for hygiene   Functional Standing Tolerance   Time approx 1-2 min   Activity static standing   Comments minAx2 to maintain standing balance   Bed Mobility   Rolling R 5  Supervision   Additional items Bedrails; Increased time required   Rolling L 5  Supervision   Additional items Bedrails; Increased time required   Supine to Sit 3  Moderate assistance   Additional items HOB elevated; Bedrails; Increased time required;Verbal cues; Assist x 1   Additional Comments Pt req maxA for scooting towards EOB, Fair- balance in static sitting with lateral LOB   Transfers   Sit to Stand 4  Minimal assistance   Additional items Assist x 2   Stand to Sit 4  Minimal assistance   Additional items Assist x 2   Stand pivot 4  Minimal assistance   Additional items Assist x 2; Increased time required;Verbal cues   Additional Comments Transfers with RW   Cognition   Overall Cognitive Status Impaired   Arousal/Participation Responsive; Cooperative;Lethargic   Attention Attends with cues to redirect   Orientation Level Oriented X4   Memory Unable to assess   Following Commands Follows one step commands without difficulty   Activity Tolerance   Activity Tolerance Patient limited by fatigue   Medical Staff Made Aware Assist from CNA with mobility   Assessment   Assessment Pt seen on this day for OT session focusing on ADL retraining, bed mobility, sitting balance, sitting tolerance, standing tolerance, functional transfers to increase ability to participate in ADLs/self-care and functional tasks  Pt received supine in bed, using bed pan  This writer and CNA assisted pt with toileting hygiene following BM  Pt req maxA for toileting hygiene, SUP for rolling to L and R sides with VC for hand placement and task intiation during toileting  Pt req modA for UB bathing in supine, maxA for LB bathing in supine  Pt req modA to complete supine > sit EOB transferincluding VC and assist at shoulders  Pt req maxA for scooting toward EOB once seated, fair- static sitting balance with 1 lateral LOB, corrected by this writer   Pt completed STS transfer from EOB with Nathan x 2 using RW  Pt incontinent of bowel when standing  Pot tolerated standing approx 1-2 min while maxA for hygiene following BM  Pt completed SPT from EOB to recliner with minAx2, requiring increased time and increased encouragement to complete  Pt attempted to doff socks while seated in recliner, unable to do, and stating "I'm not going to be able to get them on either," maxA for donning/doffing socks  Pt continues to present with ADL deficits, functional mobility deficits, bed mobility deficits, balance deficits, functional reach deficits, and decreased functional activity tolerance  Pt will benefit from continued acute skilled OT during hospitalization/ OT recommendation is for STR upon d/c  The patient's raw score on the AM-PAC Daily Activity inpatient short form is 14, standardized score is 33 39, less than 39 4  Patients at this level are likely to benefit from discharge to post-acute rehabilitation services  Please refer to the recommendation of the Occupational Therapist for safe discharge planning  Plan   Treatment Interventions ADL retraining;Functional transfer training;UE strengthening/ROM; Endurance training;Neuromuscular reeducation; Compensatory technique education; Energy conservation   Goal Expiration Date 11/22/21   OT Treatment Day 7   OT Frequency 2-3x/wk   Recommendation   OT Discharge Recommendation Post acute rehabilitation services   OT - OK to Discharge Yes   Additional Comments  when medically cleared   AM-[facility] Daily Activity Inpatient   Lower Body Dressing 2   Bathing 2   Toileting 1   Upper Body Dressing 3   Grooming 3   Eating 3   Daily Activity Raw Score 14   Daily Activity Standardized Score (Calc for Raw Score >=11) 33 39       Marti Hooper OTR/L

## 2021-11-11 NOTE — PROGRESS NOTES
[facility]  Progress Note - Justino Tenorio [birth year], 77 y o  female MRN: [id number]  Unit/Bed#: [facility] 929-01 Encounter: [id number]  Primary Care Provider: Luisa Tanner MD   Date and time admitted to hospital: 10/9/2021  2:19 PM      * Sepsis on admission  Assessment & Plan  Previously with fever coupled with tachycardia and elevated procalcitonin  Likely secondary to infected left renal hematoma/perinephric abscess -> Fusobacterium bacteremia noted - fluid culture from 10/13 s/p IR-guided drainage growing Enterobacter/Enterococcus -> see antibiotic regimen below    Perinephric abscess  Assessment & Plan  Presented with left renal hematoma  Pigtail catheter is noted medial to kidney  Renal pelvis may be collapsed  Hemorrhage and thickening extending in the combined interfascial place of retrospective as well as some high density fluid within     S/p IR drainage 10/12 with JOSE ANTONIO drain placement x 2  S/p repeat IR drainage 11/1 w/ negative cultures  Repeat CT imaging on 11/8 revealed resolution perinephric abscess and improvement/resolution of various other noted abscesses on report - s/p removal of JOSE ANTONIO drains - discussed w/ Infectious Disease on 11/9 and antibiotic regimen discontinued     Acute renal failure superimposed on CKD stage 5  Assessment & Plan  Transitioned to ESRD now dialysis dependent - nephrology following  See plan for associated perinephric abscess and underlying obstructive uropathy below  Awaiting skilled rehab placement w/ coordination for outpatient dialysis chair time    Bacteremia  Assessment & Plan  Blood cultures from 10/9 growing Fusobacterium - discontinued IV Zosyn regimen today  Repeat blood cultures 10/12 are negative  Appreciate Infectious Disease evaluation    Swelling of right upper extremity  Assessment & Plan  Underwent repeat RUE vascular doppler study noting "a narrowing in the subclavian vein at the clavicle created elevated PSV and turbulent flow  The dialysis AVF appears to be matured with adequate diameter, flow volumes and less than 6mm in depth throughout the arm  Antegrade, high resistant blunted flow noted in the peripheral arteries  No evidence of outflow obstruction  No evidence of a pseudoaneurysm  No evidence of a large venous branch "  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  S/p fistulagram  Improved with hemodialysis  IR recommending compression w/ ACE bandage - no need for repeat fistulogram  Appreciate PT/OT input w/ recommendation of skilled rehab    Obstructive uropathy  Assessment & Plan  Chronic bilateral hydronephrosis w/ recent removal of stents  Followed by urology and nephrology     Essential hypertension  Assessment & Plan  Continue Lopressor    History of pulmonary embolism  Assessment & Plan  Continue Eliquis    Hypothyroidism  Assessment & Plan  C/w Synthroid     Anemia of chronic disease  Assessment & Plan  Monitor H/H  S/p PRBC transfusion on 10/29 - continue to transfuse if necessary    Polycythemia vera   Assessment & Plan  With associated history of MDS  Follows outpatient hematology  Currently off a Jakafi regimen for polycythemia vera - undergoes Aranesp on every four weeks  Monitor CBC    Diarrhea  Assessment & Plan  Chronic issue  PRN Imodium on board      DVT Prophylaxis:  Eliquis       Patient Centered Rounds:  I have performed bedside rounds and discussed plan of care with nursing today  Discussions with Specialists or Other Care Team Provider:  see above assessments if applicable    Education and Discussions with Family / Patient:  Patient at bedside    Time Spent for Care:  32 minutes  More than 50% of total time spent on counseling and coordination of care as described above      Current Length of Stay: 33 day(s)    Current Patient Status: Inpatient   Certification Statement:  Patient will continue to require additional hospital stay due to assessments as noted above  Code Status: Level 1 - Full Code        Subjective:     No new complaints this time  Remains intermittently weak/fatigued  Objective:     Vitals:   Temp (24hrs), Av 2 °F (36 8 °C), Min:97 6 °F (36 4 °C), Max:98 6 °F (37 °C)    Temp:  [97 6 °F (36 4 °C)-98 6 °F (37 °C)] 97 6 °F (36 4 °C)  HR:  [72-88] 73  Resp:  [16-20] 16  BP: (108-128)/(62-81) 115/62  SpO2:  [93 %-95 %] 95 %  Body mass index is 35 31 kg/m²  Input and Output Summary (last 24 hours): Intake/Output Summary (Last 24 hours) at 2021 1334  Last data filed at 2021 1220  Gross per 24 hour   Intake 500 ml   Output 2033 ml   Net -1533 ml       Physical Exam:     GENERAL:  Weak/fatigued  HEAD:  Normocephalic - atraumatic  EYES: PERRL - EOMI   MOUTH:  Mucosa moist  NECK:  Supple - full range of motion  CARDIAC:  Rate controlled - S1/S2 positive  PULMONARY:  Fairly clear to auscultation - nonlabored respirations at rest  ABDOMEN:  Soft - nontender/nondistended - active bowel sounds  MUSCULOSKELETAL:  Motor strength/range of motion deconditioned - right forearm/hand pitting edema - bilateral LE edema present  NEUROLOGIC:  Baseline cognitive impairment noted  SKIN:  Chronic wrinkles/blemishes   PSYCHIATRIC:  Mood/affect flat      Additional Data:     Labs & Recent Cultures:    Results from last 7 days   Lab Units 21  0435 21  0531 21  0458   WBC Thousand/uL 5 23   < > 5 11   HEMOGLOBIN g/dL 7 8*   < > 7 7*   HEMATOCRIT % 24 8*   < > 24 7*   PLATELETS Thousands/uL 180   < > 177   BANDS PCT %  --   --  3   NEUTROS PCT % 70   < >  --    LYMPHS PCT % 17   < >  --    LYMPHO PCT %  --   --  11*   MONOS PCT % 9   < >  --    MONO PCT %  --   --  3*   EOS PCT % 1   < > 2    < > = values in this interval not displayed       Results from last 7 days   Lab Units 21  0435 21  0708 21  0540   POTASSIUM mmol/L 3 5   < > 3 1*   CHLORIDE mmol/L 102   < > 101   CO2 mmol/L 29   < > 27   BUN mg/dL 21 < > 11   CREATININE mg/dL 4 55*   < > 2 95*   CALCIUM mg/dL 8 4   < > 8 3   ALK PHOS U/L  --   --  200*   ALT U/L  --   --  7*   AST U/L  --   --  25    < > = values in this interval not displayed  Last 24 Hours Medication List:   Current Facility-Administered Medications   Medication Dose Route Frequency Provider Last Rate    acetaminophen  650 mg Oral Q6H PRN Michelle Vale MD      apixaban  2 5 mg Oral BID Curly Brain, DO      atorvastatin  40 mg Oral HS Michelle Vale MD      calcitriol  0 25 mcg Oral Daily Michelle Vale MD      cholecalciferol  800 Units Oral Daily Michelle Vale MD      citalopram  20 mg Oral Daily Michelle Vale MD      levothyroxine  75 mcg Oral Daily Michelle Vale MD      loperamide  2 mg Oral 4x Daily PRN Duglas Morgan PA-C      melatonin  3 mg Oral HS Michelle Vale MD      metoprolol  2 5 mg Intravenous Q6H PRN Duglas Morgan PA-C      metoprolol tartrate  25 mg Oral BID Duglas Morgan PA-C      midodrine  10 mg Oral Daily PRN Ortiz Ingram MD      ondansetron  4 mg Intravenous Q6H PRN Michelle Vale MD      pantoprazole  40 mg Oral BID LAURO Laguerre DO      saccharomyces boulardii  250 mg Oral Daily Michelle Vale MD                      ** Please Note: This note is constructed using a voice recognition dictation system  An occasional wrong word/phrase or sound-a-like substitution may have been picked up by dictation device due to the inherent limitations of voice recognition software  Read the chart carefully and recognize, using reasonable context, where substitutions may have occurred  **

## 2021-11-11 NOTE — PLAN OF CARE
Problem: MOBILITY - ADULT  Goal: Maintain or return to baseline ADL function  Description: INTERVENTIONS:  -  Assess patient's ability to carry out ADLs; assess patient's baseline for ADL function and identify physical deficits which impact ability to perform ADLs (bathing, care of mouth/teeth, toileting, grooming, dressing, etc )  - Assess/evaluate cause of self-care deficits   - Assess range of motion  - Assess patient's mobility; develop plan if impaired  - Assess patient's need for assistive devices and provide as appropriate  - Encourage maximum independence but intervene and supervise when necessary  - Involve family in performance of ADLs  - Assess for home care needs following discharge   - Consider OT consult to assist with ADL evaluation and planning for discharge  - Provide patient education as appropriate  Outcome: Progressing  Goal: Maintains/Returns to pre admission functional level  Description: INTERVENTIONS:  - Perform BMAT or MOVE assessment daily    - Set and communicate daily mobility goal to care team and patient/family/caregiver  - Collaborate with rehabilitation services on mobility goals if consulted  - Perform Range of Motion   - Reposition patient every 2 hours    - Dangle patient   - Stand patient   - Ambulate patient   - Out of bed to chair   - Out of bed for meals   - Out of bed for toileting  - Record patient progress and toleration of activity level   Outcome: Progressing     Problem: Potential for Falls  Goal: Patient will remain free of falls  Description: INTERVENTIONS:  - Educate patient/family on patient safety including physical limitations  - Instruct patient to call for assistance with activity   - Consult OT/PT to assist with strengthening/mobility   - Keep Call bell within reach  - Keep bed low and locked with side rails adjusted as appropriate  - Keep care items and personal belongings within reach  - Initiate and maintain comfort rounds  - Make Fall Risk Sign visible to staff  - Offer Toileting every 2 Hours, in advance of need  - Initiate/Maintain alarms  - Obtain necessary fall risk management equipment  - Apply yellow socks and bracelet for high fall risk patients  - Consider moving patient to room near nurses station  Outcome: Progressing     Problem: Prexisting or High Potential for Compromised Skin Integrity  Goal: Skin integrity is maintained or improved  Description: INTERVENTIONS:  - Identify patients at risk for skin breakdown  - Assess and monitor skin integrity  - Assess and monitor nutrition and hydration status  - Monitor labs   - Assess for incontinence   - Turn and reposition patient  - Assist with mobility/ambulation  - Relieve pressure over bony prominences  - Avoid friction and shearing  - Provide appropriate hygiene as needed including keeping skin clean and dry  - Evaluate need for skin moisturizer/barrier cream  - Collaborate with interdisciplinary team   - Patient/family teaching  - Consider wound care consult   Outcome: Progressing     Problem: Nutrition/Hydration-ADULT  Goal: Nutrient/Hydration intake appropriate for improving, restoring or maintaining nutritional needs  Description: Monitor and assess patient's nutrition/hydration status for malnutrition  Collaborate with interdisciplinary team and initiate plan and interventions as ordered  Monitor patient's weight and dietary intake as ordered or per policy  Utilize nutrition screening tool and intervene as necessary  Determine patient's food preferences and provide high-protein, high-caloric foods as appropriate       INTERVENTIONS:  - Monitor oral intake, urinary output, labs, and treatment plans  - Assess nutrition and hydration status and recommend course of action  - Evaluate amount of meals eaten  - Assist patient with eating if necessary   - Allow adequate time for meals  - Recommend/ encourage appropriate diets, oral nutritional supplements, and vitamin/mineral supplements  - Order, calculate, and assess calorie counts as needed  - Recommend, monitor, and adjust tube feedings and TPN/PPN based on assessed needs  - Assess need for intravenous fluids  - Provide specific nutrition/hydration education as appropriate  - Include patient/family/caregiver in decisions related to nutrition  Outcome: Progressing     Problem: METABOLIC, FLUID AND ELECTROLYTES - ADULT  Goal: Electrolytes maintained within normal limits  Description: INTERVENTIONS:  - Monitor labs and assess patient for signs and symptoms of electrolyte imbalances  - Administer electrolyte replacement as ordered  - Monitor response to electrolyte replacements, including repeat lab results as appropriate  - Instruct patient on fluid and nutrition as appropriate  Outcome: Progressing  Goal: Fluid balance maintained  Description: INTERVENTIONS:  - Monitor labs   - Monitor I/O and WT  - Instruct patient on fluid and nutrition as appropriate  - Assess for signs & symptoms of volume excess or deficit  Outcome: Progressing

## 2021-11-11 NOTE — CASE MANAGEMENT
Case Management Discharge Planning Note    Patient name Yesica Sands  Location [facility] 929/[facility] 929-01 MRN [id number]  : 1945 Date 2021       Current Admission Date: 10/9/2021  Current Admission Diagnosis:Sepsis on admission   Patient Active Problem List    Diagnosis Date Noted    Hypercalcemia 2021    Hypothyroidism 10/19/2021    Bacteremia 10/19/2021    Acute renal failure (HCC)     Pleural effusion 10/10/2021    Swelling of right upper extremity 10/10/2021    Perinephric abscess 10/09/2021    Iron deficiency anemia due to chronic blood loss 2021    Poor venous access 2021    Ambulatory dysfunction 2021    Stage 5 chronic kidney disease not on chronic dialysis ([facility] [address] ) 2021    Edema of right upper extremity 2021    Febrile illness 07/15/2021    COVID-19 07/15/2021    Obesity, morbid ([facility] [address] ) 2021    Right upper quadrant cyst 2021    Obstructive left pyelonephritis 2021    Constipation 2021    Anemia 2021    Gross hematuria 2021    Hydronephrosis of left kidney 2021    Hyperlipemia 2021    Class 2 severe obesity due to excess calories with serious comorbidity and body mass index (BMI) of 35 0 to 35 9 in adult [facility]) 2021    Chronic thrombosis of subclavian vein ([facility] [address] )     History of Clostridioides difficile colitis 2020    History of shingles 2020    Depression 2020    Polycythemia 2020    Inverted T wave 07/15/2020    Benign neoplasm of supratentorial region of brain ([address] ) 2019    Meningioma ([facility] [address] ) 2019    Thoracic compression fracture (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 [address] [facility]  Phone: [phone] Fax: [phone]    [id number] - [place], [address] [place] [address]  Phone: [phone] Fax: [phone]    Primary Care Provider: Ale Bello MD    Primary Insurance: Yocasta BERNAL  Secondary Insurance:     DISCHARGE DETAILS:    CM spoke to pt's son and notified him about the lack of unvaccinated female beds  Per pt he is open to either [facility] [place] or [facility] depending on who is able to offer an unvaccinated bed  CM will follow

## 2021-11-12 LAB
ANION GAP SERPL CALCULATED.3IONS-SCNC: 6 MMOL/L (ref 4–13)
BASOPHILS # BLD AUTO: 0.05 THOUSANDS/ΜL (ref 0–0.1)
BASOPHILS NFR BLD AUTO: 1 % (ref 0–1)
BUN SERPL-MCNC: 12 MG/DL (ref 5–25)
CALCIUM SERPL-MCNC: 8.4 MG/DL (ref 8.3–10.1)
CHLORIDE SERPL-SCNC: 100 MMOL/L (ref 100–108)
CO2 SERPL-SCNC: 30 MMOL/L (ref 21–32)
CREAT SERPL-MCNC: 2.88 MG/DL (ref 0.6–1.3)
EOSINOPHIL # BLD AUTO: 0.04 THOUSAND/ΜL (ref 0–0.61)
EOSINOPHIL NFR BLD AUTO: 1 % (ref 0–6)
ERYTHROCYTE [DISTWIDTH] IN BLOOD BY AUTOMATED COUNT: 18.1 % (ref 11.6–15.1)
FLUAV RNA RESP QL NAA+PROBE: NEGATIVE
FLUBV RNA RESP QL NAA+PROBE: NEGATIVE
GFR SERPL CREATININE-BSD FRML MDRD: 15 ML/MIN/1.73SQ M
GLUCOSE SERPL-MCNC: 88 MG/DL (ref 65–140)
HCT VFR BLD AUTO: 24.4 % (ref 34.8–46.1)
HGB BLD-MCNC: 7.8 G/DL (ref 11.5–15.4)
IMM GRANULOCYTES # BLD AUTO: 0.08 THOUSAND/UL (ref 0–0.2)
IMM GRANULOCYTES NFR BLD AUTO: 2 % (ref 0–2)
LYMPHOCYTES # BLD AUTO: 0.89 THOUSANDS/ΜL (ref 0.6–4.47)
LYMPHOCYTES NFR BLD AUTO: 17 % (ref 14–44)
MCH RBC QN AUTO: 29.4 PG (ref 26.8–34.3)
MCHC RBC AUTO-ENTMCNC: 32 G/DL (ref 31.4–37.4)
MCV RBC AUTO: 92 FL (ref 82–98)
MONOCYTES # BLD AUTO: 0.36 THOUSAND/ΜL (ref 0.17–1.22)
MONOCYTES NFR BLD AUTO: 7 % (ref 4–12)
NEUTROPHILS # BLD AUTO: 3.81 THOUSANDS/ΜL (ref 1.85–7.62)
NEUTS SEG NFR BLD AUTO: 72 % (ref 43–75)
NRBC BLD AUTO-RTO: 0 /100 WBCS
PLATELET # BLD AUTO: 164 THOUSANDS/UL (ref 149–390)
PMV BLD AUTO: 10.6 FL (ref 8.9–12.7)
POTASSIUM SERPL-SCNC: 3.6 MMOL/L (ref 3.5–5.3)
RBC # BLD AUTO: 2.65 MILLION/UL (ref 3.81–5.12)
RSV RNA RESP QL NAA+PROBE: NEGATIVE
SARS-COV-2 RNA RESP QL NAA+PROBE: NEGATIVE
SODIUM SERPL-SCNC: 136 MMOL/L (ref 136–145)
WBC # BLD AUTO: 5.23 THOUSAND/UL (ref 4.31–10.16)

## 2021-11-12 PROCEDURE — 97530 THERAPEUTIC ACTIVITIES: CPT

## 2021-11-12 PROCEDURE — 99232 SBSQ HOSP IP/OBS MODERATE 35: CPT | Performed by: INTERNAL MEDICINE

## 2021-11-12 PROCEDURE — 85025 COMPLETE CBC W/AUTO DIFF WBC: CPT | Performed by: INTERNAL MEDICINE

## 2021-11-12 PROCEDURE — 80048 BASIC METABOLIC PNL TOTAL CA: CPT | Performed by: INTERNAL MEDICINE

## 2021-11-12 PROCEDURE — 99233 SBSQ HOSP IP/OBS HIGH 50: CPT | Performed by: INTERNAL MEDICINE

## 2021-11-12 PROCEDURE — 0241U HB NFCT DS VIR RESP RNA 4 TRGT: CPT | Performed by: INTERNAL MEDICINE

## 2021-11-12 RX ADMIN — Medication 250 MG: at 08:24

## 2021-11-12 RX ADMIN — APIXABAN 2.5 MG: 2.5 TABLET, FILM COATED ORAL at 08:24

## 2021-11-12 RX ADMIN — CHOLECALCIFEROL TAB 10 MCG (400 UNIT) 800 UNITS: 10 TAB at 08:24

## 2021-11-12 RX ADMIN — CALCITRIOL 0.25 MCG: 0.25 CAPSULE, LIQUID FILLED ORAL at 08:24

## 2021-11-12 RX ADMIN — PANTOPRAZOLE SODIUM 40 MG: 40 TABLET, DELAYED RELEASE ORAL at 17:02

## 2021-11-12 RX ADMIN — ATORVASTATIN CALCIUM 40 MG: 40 TABLET, FILM COATED ORAL at 22:10

## 2021-11-12 RX ADMIN — MELATONIN TAB 3 MG 3 MG: 3 TAB at 22:09

## 2021-11-12 RX ADMIN — APIXABAN 2.5 MG: 2.5 TABLET, FILM COATED ORAL at 17:02

## 2021-11-12 RX ADMIN — CITALOPRAM HYDROBROMIDE 20 MG: 20 TABLET ORAL at 08:24

## 2021-11-12 RX ADMIN — LEVOTHYROXINE SODIUM 75 MCG: 75 TABLET ORAL at 08:24

## 2021-11-12 RX ADMIN — PANTOPRAZOLE SODIUM 40 MG: 40 TABLET, DELAYED RELEASE ORAL at 06:10

## 2021-11-12 RX ADMIN — METOPROLOL TARTRATE 25 MG: 25 TABLET, FILM COATED ORAL at 08:25

## 2021-11-12 NOTE — PROGRESS NOTES
Progress Note - Infectious Disease   Lauren Mcarthur 77 y o  female MRN: [id number]  Unit/Bed#: [facility] 929-01 Encounter: [id number]      Impression/Plan:  1  Sepsis, POA   Patient presented with progressing flank/abdominal discomfort likely related to problem 3  Clinical parameters had improved  Overall poor progress likely due to 4  Tachycardia/borderline blood pressures 10/28, antibiotics restarted given 3  Currently stable   Patient has completed almost 2 weeks of intravenous Zosyn with more than a week completed since the time of the drainage  She has been stable off all antibiotics over the last 72 hours  Monitor off all antibiotics  Supportive care     2  Fusobacterium bacteremia   Source unknown, possibly transient given 1 or occult GI source  CT imaging of the abdomen on admission unremarkable  Repeat imaging with new collections noted in the abdomen and partial SBO, potential source   Drains placed   Patient without any teeth  Repeat blood cultures without growth   Not isolated on fluid cultures  No prior C-scope on chart review   EGD unremarkable  Imaging of the right upper quadrant ultrasound unremarkable   Completed empiric course of Flagyl    No further antibiotics for this issue  GI follow-up  C scope once more stable/outpatient     3  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   Patient has a chronic obstructive uropathy involving the left side and recently had PCN removal   Subsequently developed bleeding at the site and hematoma on imaging likely due to chronic anticoagulation   IR aspiration and drain placement on 10/13 and earlier urine cultures with same organisms   Drains likely provided significant source control   IR drain check noted one tube dislodged   Repeat CT scan with persistent collections, uncertain if these are sterile   Status post repeat aspiration with cultures   With changes in vitals on 10/28, antibiotics restarted   Repeat body fluid cultures negative   Repeat CT scan with resolution of perinephric abscess, and other collection in the gutter   Decreased collection in the cul-de-sac   Patient has completed more than a week of antibiotics from the time of the drain placement  Drains has been removed  Monitor off all antibiotics  Supportive care     4  Yecenia Braxton on CKD with fistula  Patient with baseline chronic kidney disease with fistula created in the right upper extremity   Stenosis causing swelling  Kidney function worsened over entire admission   Initiated on dialysis  Nephrology follow-up  Hemodialysis  Vascular surgery follow-up/imaging     5  History of C diff and diarrhea   Patient had a history of C diff in 2019  She has had repeat PCRs in July 2021 and now negative despite recent diarrhea   Diarrhea is now chronic   Being controlled Imodium  No additional antibiotics for now  Monitor stool output  No C diff prophylaxis for now     6  Polycythemia vera and MDS   Recent drop in hemoglobin and leukopenia likely related  Ongoing management/supportive care as per primary   Recommend follow-up with Oncology as outpatient      7  Prior PE on anticoagulation   Likely risk factor for issues as above  AC as per primary        8  Transaminitis   Patient noted to have slowly increasing alkaline phosphatase and AST   In the setting of this bacteremia consider occult abscess  Now downtrending/stable   Liver/RUQ ultrasound unremarkable   LFTs are much improved  Antibiotics as above  GI follow-up     9  Melena   Possible upper GI bleed   Ongoing follow-up and workup as per GI   EGD reportedly unremarkable   Additional care as per primary      Discussed the above management plan with the primary service    Will see the patient again 11/15/2021 if not discharged    Please call if questions    Antibiotics:  Status post 13 days of Zosyn  Post drain day 11  Off antibiotics 3    Subjective:  Patient has no fever, chills, sweats; no nausea, vomiting, diarrhea; no cough, shortness of breath; no pain  No new symptoms  Objective:  Vitals:  Temp:  [97 5 °F (36 4 °C)] 97 5 °F (36 4 °C)  HR:  [85-93] 93  Resp:  [16] 16  BP: ()/(54-83) 146/83  SpO2:  [91 %-95 %] 91 %  Temp (24hrs), Av 5 °F (36 4 °C), Min:97 5 °F (36 4 °C), Max:97 5 °F (36 4 °C)  Current: Temperature: 97 5 °F (36 4 °C)    Physical Exam:   General Appearance:  Alert, interactive, nontoxic, no acute distress  Throat: Oropharynx moist without lesions  Lungs:   Clear to auscultation bilaterally; no wheezes, rhonchi or rales; respirations unlabored   Heart:  RRR; no murmur, rub or gallop   Abdomen:   Soft, non-tender, non-distended, positive bowel sounds  Extremities: No clubbing, cyanosis or edema   Skin: No new rashes or lesions  No draining wounds noted  Labs, Imaging, & Other studies:   All pertinent labs and imaging studies were personally reviewed  Results from last 7 days   Lab Units 21  0616 21  0435 11/10/21  0455   WBC Thousand/uL 5 23 5 23 5 54   HEMOGLOBIN g/dL 7 8* 7 8* 7 9*   PLATELETS Thousands/uL 164 180 185     Results from last 7 days   Lab Units 21  0616 21  0435 21  0435 11/10/21  0455 11/10/21  0455   SODIUM mmol/L 136  --  140  --  141   POTASSIUM mmol/L 3 6   < > 3 5   < > 3 6   CHLORIDE mmol/L 100   < > 102   < > 102   CO2 mmol/L 30   < > 29   < > 27   BUN mg/dL 12   < > 21   < > 13   CREATININE mg/dL 2 88*   < > 4 55*   < > 3 51*   EGFR ml/min/1 73sq m 15   < > 9   < > 12   CALCIUM mg/dL 8 4   < > 8 4   < > 8 4    < > = values in this interval not displayed

## 2021-11-12 NOTE — CASE MANAGEMENT
Case Management Discharge Planning Note    Patient name Naz Brown  Location [facility] 929/[facility] 929-01 MRN [id number]  : 1945 Date 2021       Current Admission Date: 10/9/2021  Current Admission Diagnosis:Sepsis on admission   Patient Active Problem List    Diagnosis Date Noted    Hypercalcemia 2021    Hypothyroidism 10/19/2021    Bacteremia 10/19/2021    Acute renal failure (HCC)     Pleural effusion 10/10/2021    Swelling of right upper extremity 10/10/2021    Perinephric abscess 10/09/2021    Iron deficiency anemia due to chronic blood loss 2021    Poor venous access 2021    Ambulatory dysfunction 2021    Stage 5 chronic kidney disease not on chronic dialysis ([facility] ) 2021    Edema of right upper extremity 2021    Febrile illness 07/15/2021    COVID-19 07/15/2021    Obesity, morbid ([facility] ) 2021    Right upper quadrant cyst 2021    Obstructive left pyelonephritis 2021    Constipation 2021    Anemia 2021    Gross hematuria 2021    Hydronephrosis of left kidney 2021    Hyperlipemia 2021    Class 2 severe obesity due to excess calories with serious comorbidity and body mass index (BMI) of 35 0 to 35 9 in adult [facility]) 2021    Chronic thrombosis of subclavian vein ([facility] )     History of Clostridioides difficile colitis 2020    History of shingles 2020    Depression 2020    Polycythemia 2020    Inverted T wave 07/15/2020    Benign neoplasm of supratentorial region of brain ([address] ) 2019    Meningioma ([facility] ) 2019    Thoracic compression fracture ([facility] ) 2019    Sepsis on admission 2019    Acute renal failure superimposed on CKD stage 5     Intraventricular hemorrhage ([address] ) 2019    Diarrhea 2019    Polycythemia vera  10/23/2018    Essential hypertension 10/06/2018    Secondary hyperparathyroidism of renal origin ([address] ) 10/05/2018  Obstructive uropathy 05/17/2018    Small bowel obstruction ([address] ) 02/09/2018    Anemia of chronic disease 07/07/2017    History of pulmonary embolism 10/07/2016    Bladder mass 10/07/2016    Mass of urethra 09/02/2015      LOS (days): 34  Geometric Mean LOS (GMLOS) (days): 8 40  Days to GMLOS:-25 5     OBJECTIVE:  Risk of Unplanned Readmission Score: 36         Current admission status: Inpatient   Preferred Pharmacy:   [facility]/pharmacy #[id number]  [facility] [address]  Phone: [phone] Fax: [phone]    [facility] [address], [facility]  [address] [place] [address]  Phone: [phone] Fax: [address] [facility] [address] [facility] [address] [facility] [address] [address]  Phone: [phone] Fax: [phone]    [id number] [place]  [address] [facility] [address]  Phone: [phone] Fax: [phone]    Primary Care Provider: Ozzie Rosenberg MD    Primary Insurance: Carlene Petersen [address] [facility] [address]  Secondary Insurance:     DISCHARGE DETAILS:    Treatment Team Recommendation: Short Term Rehab  Discharge Destination Plan[de-identified] Short Term Rehab  Transport at Discharge : [facility]  Dispatcher Contacted: Yes  Number/Name of Dispatcher: ELISSA     ETA of Transport (Date): 11/13/21  Transfer Mode: Stretcher    IMM Given (Date):: 11/12/21  IMM Given to[de-identified] Family  Family notified[de-identified] SonAryan was notified by Ting from [facility] that pt was approved by the insurance company to transition to [facility]  CM notified pt's son and attending  CM will follow

## 2021-11-12 NOTE — PROGRESS NOTES
Susana Valera PROGRESS NOTE   Zoya Wong 77 y o  female MRN: [id number]  [facility] Encounter: [id number]  Reason for Consult: LUANN    ASSESSMENT and PLAN:  1  ESRD on HD (new start this admission)  · Admitted with a creatinine of 3 26 on 10/9/21  · Creatinine worsened to a peak of 6 78 on 10/20/21 and started HD on 10/20/21  · Now deemed ESRD and being dialyzed on a TTS schedule  · Last HD was Thurs, 11/11/21  · Plan for HD tomorrow  · Initially, CM was working on outpatient chair time in [facility] where son leaves but that was unsuccessful  Now looking at HD units in our area - referred to [facility]  2  Chronic kidney disease, stage V:  · Baseline creatinine around 3 0 to 3 5  · Follows with Dr Ruddy Olivier     3  Access:  · L IJ permcath - placed on 10/20/21  · R arm AVF cannulated 11/9/21 and 11/11/21  · Plan to continue using R arm AVF  If stable x 2 weeks, then may remove permcath  4  Hypertension:  · BP is at goal    · Continue metoprolol 25 mg BID  · No changes  5  Anemia:    · Hemoglobin is below goal but stable  · Not on JENELLE due to history of PE  Of note, has history of polycythemia  6  Mineral bone disease:  · Continue calcitriol 0 25 mcg daily for 2HPT  · Continue renal diet for hyperphosphatemia  · Phos is at goal without binders  7  Sepsis due to infected left renal hematoma/perinephric abscess with chronic obstructive uropathy  · Repeat CT 11/8/21 - indicates improvement  · Now off abx       8  Fusobacterium bacteremia: Completed antibiotics  DISPOSITION:  · HD tomorrow  · Continue to use R arm AVF  · Awaiting outpatient HD placement  SUBJECTIVE / 24H INTERVAL HISTORY:  Tolerated HD yesterday  Denies any CP or SOB  Appetite is okay       OBJECTIVE:  Current Weight: Weight - Scale: 77 9 kg (171 lb 11 8 oz)  Vitals:    11/11/21 1610 11/11/21 2055 11/11/21 2222 11/12/21 0823   BP: (!) 86/54 111/75 112/70 146/83   BP Location: Left arm      Pulse:   85 93   Resp:   16    Temp:   97 5 °F (36 4 °C)    TempSrc:       SpO2:   92% 91%   Weight:       Height:           Intake/Output Summary (Last 24 hours) at 11/12/2021 0850  Last data filed at 11/12/2021 0401  Gross per 24 hour   Intake 660 ml   Output 2183 ml   Net -1523 ml   General: conscious, cooperative, no distress  Skin: dry  Eyes: pink conjunctivae  ENT: moist mucous membranes  Chest/Lungs: equal chest expansion, clear breath sounds  CVS: distinct heart sounds, normal rate, regular rhythm, no rub  Abdomen: soft, non tender, non distended, normal bowel sounds, (+) ileal conduit  Extremities: no edema  Neuro: awake, alert     Psych: appropriate affect    Medications:    Current Facility-Administered Medications:     acetaminophen (TYLENOL) tablet 650 mg, 650 mg, Oral, Q6H PRN, Rosa Maria Garcia MD, 650 mg at 11/03/21 1613    apixaban (ELIQUIS) tablet 2 5 mg, 2 5 mg, Oral, BID, Rendell Gregory, DO, 2 5 mg at 11/12/21 0824    atorvastatin (LIPITOR) tablet 40 mg, 40 mg, Oral, HS, Rosa Maria Garcia MD, 40 mg at 11/11/21 2249    calcitriol (ROCALTROL) capsule 0 25 mcg, 0 25 mcg, Oral, Daily, Rosa Maria Garcia MD, 0 25 mcg at 11/12/21 0824    cholecalciferol (VITAMIN D3) tablet 800 Units, 800 Units, Oral, Daily, Rosa Maria Garcia MD, 800 Units at 11/12/21 0824    citalopram (CeleXA) tablet 20 mg, 20 mg, Oral, Daily, Rosa Maria Garcia MD, 20 mg at 11/12/21 0824    levothyroxine tablet 75 mcg, 75 mcg, Oral, Daily, Rosa Maria Garcia MD, 75 mcg at 11/12/21 9717    loperamide (IMODIUM) capsule 2 mg, 2 mg, Oral, 4x Daily PRN, Dior Gomes PA-C, 2 mg at 11/10/21 2144    melatonin tablet 3 mg, 3 mg, Oral, HS, Rosa Maria Garcia MD, 3 mg at 11/11/21 2249    metoprolol (LOPRESSOR) injection 2 5 mg, 2 5 mg, Intravenous, Q6H PRN, Dior Gomes PA-C, 2 5 mg at 10/20/21 1618    metoprolol tartrate (LOPRESSOR) tablet 25 mg, 25 mg, Oral, BID, Isela Funes PA-C, 25 mg at 11/12/21 0825    midodrine (PROAMATINE) tablet 10 mg, 10 mg, Oral, Daily PRN, Halle Calle MD    ondansetron [facility]) injection 4 mg, 4 mg, Intravenous, Q6H PRN, Myrtle Bolanos MD, 4 mg at 10/14/21 2153    pantoprazole (PROTONIX) EC tablet 40 mg, 40 mg, Oral, BID AC, Mumtaz Laguerre DO, 40 mg at 11/12/21 0610    saccharomyces boulardii (FLORASTOR) capsule 250 mg, 250 mg, Oral, Daily, Myrtle Bolanos MD, 250 mg at 11/12/21 9169    Laboratory Results:  Results from last 7 days   Lab Units 11/12/21  0616 11/11/21  0435 11/10/21  0455 11/09/21  0531 11/08/21  0458 11/06/21  0708   WBC Thousand/uL 5 23 5 23 5 54 4 95 5 11 4 91   HEMOGLOBIN g/dL 7 8* 7 8* 7 9* 7 8* 7 7* 7 6*   HEMATOCRIT % 24 4* 24 8* 25 3* 25 4* 24 7* 24 5*   PLATELETS Thousands/uL 164 180 185 193 177 192   POTASSIUM mmol/L 3 6 3 5 3 6 3 4* 3 7 3 5   CHLORIDE mmol/L 100 102 102 105 104 106   CO2 mmol/L 30 29 27 25 28 27   BUN mg/dL 12 21 13 26* 19 16   CREATININE mg/dL 2 88* 4 55* 3 51* 5 23* 4 40* 4 29*   CALCIUM mg/dL 8 4 8 4 8 4 8 6 8 7 8 3   PHOSPHORUS mg/dL  --   --   --   --   --  2 9

## 2021-11-12 NOTE — ASSESSMENT & PLAN NOTE
Mild to moderate pleural effusion on CXR    S/p thoracentesis by IR 10/11/21   -patient currently comfortable on room air, chest x-ray from prior shows persistent left pleural effusion [Spouse] : spouse [Formal Caregiver] : formal caregiver [FreeTextEntry1] : Follow up visit for recent hospitalization at [facility] for CHF 1/31/20 -2/7/20..  Pt is temporarily unable to leave home as it requires a considerable and taxing effort, currently exhibits an unsteady gait and requires assistive device to leave home.  \par \par  [de-identified] :  89F w/PMH afib (not on AC d/t fall risk), HTN, presented to [facility] on 1/31/20 for c/ SOB and weakness and  intermittent "fluttering" in her chest. In the ED when she was found to be hypoxic w/ sats in low 80's. Pt started on BIPAP, cxr revealed b/l pulm edema, given lasix IV. She was also in Afib rvr in 140's and was treated with Diltiazem IV with good response.  Pt was Tx with Iv LAsix, repeat imaging with improvement, repository status improved. Pt was evaluated for home O2 but did not qualify. pulse ox o ambulation above 90% \par Also Pts AFIB meds adjusted by cardiology, now off Cardizem , HR controlled on Coreg and Digoxin. She was medically cleared for discharge on 2/7/20. Upon entering home, pt is resting in recliner chair watching TV. She lives with her  who is bedbound and non verbal. Both the patient and  have a 24/hr a day HHA and both are present during the visit. Pt appears pleasant and comfortable. SHe reports that she is feeling much better since hospitalization but she is still fatigued. She denies CP, SOB, increasing weight or edema, fever, N/V/C/D. SHe reports her appetite has been good and she is eating and drinking without difficulty. She requires assistance and DME for ambulation. HHA prepares meals and walks with her for toileting and assists with all ADLs. The daughter Velia is very involved in her care but does not live in home. HHA reports she does not weigh her daily for safety. She cannot step up on scale and stand without assistance. Discussed adhering to a low salt diet and fluid restriction of 1 liter. She follows up with Dr Villa. According to daughter Velia, pt has been confused at times, worse at night. She d/w Dr Villa and he ordered blood work which was done today and Digoxin was discontinued.

## 2021-11-12 NOTE — PHYSICAL THERAPY NOTE
PHYSICAL THERAPY NOTE          Patient Name: Jhon Meza  EKWTC'C Date: 11/12/2021 11/12/21 1143   PT Last Visit   PT Visit Date 11/12/21   Note Type   Note Type Treatment   Pain Assessment   Pain Assessment Tool 0-10   Pain Score No Pain   Restrictions/Precautions   Weight Bearing Precautions Per Order No   Other Precautions Chair Alarm; Bed Alarm;Contact/isolation; Fall Risk   General   Chart Reviewed Yes   Response to Previous Treatment Patient with no complaints from previous session  Family/Caregiver Present No   Cognition   Overall Cognitive Status Impaired   Arousal/Participation Responsive; Cooperative   Attention Attends with cues to redirect   Orientation Level Oriented X4   Memory Decreased recall of precautions   Following Commands Follows one step commands without difficulty   Comments Pt with flat affect, pleasant and cooperative to participate in therapy session  Bed Mobility   Supine to Sit Unable to assess   Sit to Supine Unable to assess   Additional Comments Pt sitting upright in bedside chair upon arrival  Pt returned to sitting upirght in bedside chair with chair alarm on and all needs within reach  Transfers   Sit to Stand 3  Moderate assistance   Additional items Assist x 1; Increased time required;Verbal cues   Stand to Sit 3  Moderate assistance   Additional items Assist x 1; Increased time required;Verbal cues   Toilet transfer 3  Moderate assistance   Additional items Assist x 1; Increased time required;Commode   Additional Comments transfers with RW: cues for hand placement and sequencing    Ambulation/Elevation   Gait pattern Excessively slow; Short stride; Foward flexed;Decreased foot clearance; Improper Weight shift   Gait Assistance 3  Moderate assist   Additional items Assist x 1;Verbal cues; Tactile cues  (+ SBA of another for safety )   Assistive Device Rolling walker   Distance 5 ft x 2 trials    Balance   Static Sitting Fair   Dynamic Sitting Fair   Static Standing Poor +   Dynamic Standing Poor   Ambulatory Poor   Endurance Deficit   Endurance Deficit Yes   Endurance Deficit Description gross weakness, deconditioning    Activity Tolerance   Activity Tolerance Patient limited by fatigue   Medical Staff Made Aware Rehab aidbartolome Sue    Nurse Made Aware RN cleared pt to participate in therapy session    Exercises   Balance training  standing with mod A x1 to maintain ~3-4 min to assist with hygiene tasks  Assessment   Prognosis Fair   Problem List Decreased strength;Decreased endurance; Impaired balance;Decreased mobility; Decreased skin integrity;Obesity   Assessment Pt seen for PT treatment session this date  Therapy session focused on functional transfers, gait training, endurance training in order to improve overall mobility and independence  Pt requires assist of 1 + SBA of another for safety  Pt performed functional transfers from bedside chair/ BSC with mod Ax1; SBA of another for safety  Pt continues to require cues for hand placement and sequencing  Pt took small steps to [facility] with mod Ax1 + SBA of another for safety- pt fatigues quickly  Pt requires increased time on BSC; pt with increased fatigue noted  Pt able to tolerate standing ~3-4 min with mod A to perform hygiene tasks; cues for weightshifting anteriorly and upright posture  Pt continues to be limited by increased fatigue/ decreased endurance requiring frequent/ extended seated rest breaks  Pt making slow but steady progress toward goals  Pt was left sitting upright in bedside chair with chair alarm on at the end of PT session with all needs in reach  Pt would benefit from continued PT services while in hospital to address remaining limitations  PT to continue to follow pt and recommends post-acute rehab services after d/c  The patient's AM-PAC Basic Mobility Inpatient Short Form Raw Score is 11, Standardized Score is 30 25   A standardized score less than 42 9 suggests the patient may benefit from discharge to post-acute rehabilitation services  Please also refer to the recommendation of the Physical Therapist for safe discharge planning  Barriers to Discharge Inaccessible home environment;Decreased caregiver support   Goals   Patient Goals to get stronger and go home    STG Expiration Date 11/15/21   PT Treatment Day 9   Plan   Treatment/Interventions Functional transfer training;LE strengthening/ROM; Endurance training;Patient/family training;Equipment eval/education;Gait training;Spoke to nursing;Spoke to case management   Progress Progressing toward goals   PT Frequency Other (Comment)  (3-5x/wk )   Recommendation   PT Discharge Recommendation Post acute rehabilitation services   Equipment Recommended Wheelchair;Walker   PT - OK to Discharge Yes  (once medically cleared )   AM-PAC Basic Mobility Inpatient   Turning in Bed Without Bedrails 2   Lying on Back to Sitting on Edge of Flat Bed 2   Moving Bed to Chair 2   Standing Up From Chair 2   Walk in Room 2   Climb 3-5 Stairs 1   Basic Mobility Inpatient Raw Score 11   Basic Mobility Standardized Score 30 25     Lucas Alba, PT, DPT

## 2021-11-12 NOTE — ASSESSMENT & PLAN NOTE
Blood cultures from 10/9 growing Fusobacterium - discontinued IV Zosyn regimen  Repeat blood cultures 10/12 are negative  Appreciate Infectious Disease evaluation

## 2021-11-12 NOTE — PROGRESS NOTES
[address] [facility]  Progress Note - Nadja Ram [birth year], 77 y o  female MRN: [id number]  [facility] En[facility]: [id number]  Primary Care Provider: Abeba Singleton MD   Date and time admitted to hospital: 10/9/2021  2:19 PM      * Sepsis on admission  Assessment & Plan  Previously with fever coupled with tachycardia and elevated procalcitonin - resolved  Likely secondary to infected left renal hematoma/perinephric abscess -> Fusobacterium bacteremia noted - fluid culture from 10/13 s/p IR-guided drainage growing Enterobacter/Enterococcus -> see antibiotic regimen below    Perinephric abscess  Assessment & Plan  Presented with left renal hematoma  Pigtail catheter is noted medial to kidney  Renal pelvis may be collapsed  Hemorrhage and thickening extending in the combined interfascial place of retrospective as well as some high density fluid within     S/p IR drainage 10/12 with JOSE ANTONIO drain placement x 2  S/p repeat IR drainage 11/1 w/ negative cultures  Repeat CT imaging on 11/8 revealed resolution perinephric abscess and improvement/resolution of various other noted abscesses on report - s/p removal of JOSE ANTONIO drains - discussed w/ Infectious Disease on 11/9 and antibiotic regimen discontinued     Acute renal failure superimposed on CKD stage 5  Assessment & Plan  Transitioned to ESRD now dialysis dependent - nephrology following  See plan for associated perinephric abscess and underlying obstructive uropathy below  Plan for discharge to skilled rehab tomorrow after dialysis session as outpatient dialysis chair time has now been set up    Bacteremia  Assessment & Plan  Blood cultures from 10/9 growing Fusobacterium - discontinued IV Zosyn regimen  Repeat blood cultures 10/12 are negative  Appreciate Infectious Disease evaluation    Swelling of right upper extremity  Assessment & Plan  Underwent repeat RUE vascular doppler study noting "a narrowing in the subclavian vein at the clavicle created elevated PSV and turbulent flow  The dialysis AVF appears to be matured with adequate diameter, flow volumes and less than 6mm in depth throughout the arm  Antegrade, high resistant blunted flow noted in the peripheral arteries  No evidence of outflow obstruction  No evidence of a pseudoaneurysm  No evidence of a large venous branch "  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  S/p fistulagram  Improved with hemodialysis  IR recommending compression w/ ACE bandage - no need for repeat fistulogram  Appreciate PT/OT input w/ recommendation of skilled rehab    Obstructive uropathy  Assessment & Plan  Chronic bilateral hydronephrosis w/ recent removal of stents  Followed by urology and nephrology     Essential hypertension  Assessment & Plan  Continue Lopressor    History of pulmonary embolism  Assessment & Plan  Continue Eliquis    Hypothyroidism  Assessment & Plan  C/w Synthroid     Anemia of chronic disease  Assessment & Plan  Monitor H/H  S/p PRBC transfusion on 10/29 - continue to transfuse if necessary    Polycythemia vera   Assessment & Plan  With associated history of MDS  Follows outpatient hematology  Currently off a Jakafi regimen for polycythemia vera - undergoes Aranesp on every four weeks  Monitor CBC    Diarrhea  Assessment & Plan  Chronic issue  PRN Imodium on board      DVT Prophylaxis:  Eliquis       Patient Centered Rounds:  I have performed bedside rounds and discussed plan of care with nursing today  Discussions with Specialists or Other Care Team Provider:  see above assessments if applicable    Education and Discussions with Family / Patient:  Patient at bedside    Time Spent for Care:  32 minutes  More than 50% of total time spent on counseling and coordination of care as described above      Current Length of Stay: 34 day(s)    Current Patient Status: Inpatient   Certification Statement:  Patient will continue to require additional hospital stay due to assessments as noted above  Code Status: Level 1 - Full Code        Subjective:     No new complaints this time  Sitting upright in a chair at the time my encounter  Remains weak/fatigued  Objective:     Vitals:   Temp (24hrs), Av 7 °F (36 5 °C), Min:97 5 °F (36 4 °C), Max:97 9 °F (36 6 °C)    Temp:  [97 5 °F (36 4 °C)-97 9 °F (36 6 °C)] 97 9 °F (36 6 °C)  HR:  [85-93] 93  Resp:  [16] 16  BP: ()/(56-83) 84/56  SpO2:  [91 %-92 %] 91 %  Body mass index is 33 54 kg/m²  Input and Output Summary (last 24 hours): Intake/Output Summary (Last 24 hours) at 2021 1848  Last data filed at 2021 1358  Gross per 24 hour   Intake 520 ml   Output 250 ml   Net 270 ml       Physical Exam:     GENERAL:  Weak/fatigued - no immediate distress at rest  HEAD:  Normocephalic - atraumatic  EYES: PERRL - EOMI   MOUTH:  Mucosa moist  NECK:  Supple - full range of motion  CARDIAC:  Rate controlled  PULMONARY:  Nonlabored respirations at rest  ABDOMEN:  Soft - nontender/nondistended - active bowel sounds  MUSCULOSKELETAL:  Motor strength/range of motion deconditioned - right forearm/hand pitting edema - bilateral LE edema present  NEUROLOGIC:  Baseline cognitive impairment present  SKIN:  Chronic wrinkles/blemishes   PSYCHIATRIC:  Mood/affect remains flat      Additional Data:     Labs & Recent Cultures:    Results from last 7 days   Lab Units 21  0616 21  0531 21  0458   WBC Thousand/uL 5 23   < > 5 11   HEMOGLOBIN g/dL 7 8*   < > 7 7*   HEMATOCRIT % 24 4*   < > 24 7*   PLATELETS Thousands/uL 164   < > 177   BANDS PCT %  --   --  3   NEUTROS PCT % 72   < >  --    LYMPHS PCT % 17   < >  --    LYMPHO PCT %  --   --  11*   MONOS PCT % 7   < >  --    MONO PCT %  --   --  3*   EOS PCT % 1   < > 2    < > = values in this interval not displayed       Results from last 7 days   Lab Units 11/12/21  0616   POTASSIUM mmol/L 3 6   CHLORIDE mmol/L 100   CO2 mmol/L 30   BUN mg/dL 12   CREATININE mg/dL 2 88*   CALCIUM mg/dL 8 4                                 Last 24 Hours Medication List:   Current Facility-Administered Medications   Medication Dose Route Frequency Provider Last Rate    acetaminophen  650 mg Oral Q6H PRN Christa Patterson MD      apixaban  2 5 mg Oral BID Nereyda Cowing, DO      atorvastatin  40 mg Oral HS Christa Patterson MD      calcitriol  0 25 mcg Oral Daily Christa Patterson MD      cholecalciferol  800 Units Oral Daily Christa Patterson MD      citalopram  20 mg Oral Daily Christa Pattesron MD      levothyroxine  75 mcg Oral Daily Christa Patterson MD      loperamide  2 mg Oral 4x Daily PRN Tempie Cords, PA-C      melatonin  3 mg Oral HS Christa Patterson MD      metoprolol  2 5 mg Intravenous Q6H PRN Tempie Cords, PA-C      metoprolol tartrate  25 mg Oral BID Tempie Cords, PA-C      midodrine  10 mg Oral Daily PRN Edenilson Stewart MD      ondansetron  4 mg Intravenous Q6H PRN Christa Patterson MD      pantoprazole  40 mg Oral BID AC Mumtaz Laguerre DO      saccharomyces boulardii  250 mg Oral Daily Christa Patterson MD                  ** Please Note: This note is constructed using a voice recognition dictation system  An occasional wrong word/phrase or sound-a-like substitution may have been picked up by dictation device due to the inherent limitations of voice recognition software  Read the chart carefully and recognize, using reasonable context, where substitutions may have occurred  **

## 2021-11-12 NOTE — ASSESSMENT & PLAN NOTE
Transitioned to ESRD now dialysis dependent - nephrology following  See plan for associated perinephric abscess and underlying obstructive uropathy below  Plan for discharge to skilled rehab tomorrow after dialysis session as outpatient dialysis chair time has now been set up

## 2021-11-12 NOTE — PLAN OF CARE
Problem: PHYSICAL THERAPY ADULT  Goal: Performs mobility at highest level of function for planned discharge setting  See evaluation for individualized goals  Description: Treatment/Interventions: Patient/family training, LE strengthening/ROM, Bed mobility, Spoke to nursing, Spoke to case management, OT  Equipment Recommended:  (TBD )       See flowsheet documentation for full assessment, interventions and recommendations  Outcome: Progressing  Note: Prognosis: Fair  Problem List: Decreased strength,Decreased endurance,Impaired balance,Decreased mobility,Decreased skin integrity,Obesity  Assessment: Pt seen for PT treatment session this date  Therapy session focused on functional transfers, gait training, endurance training in order to improve overall mobility and independence  Pt requires assist of 1 + SBA of another for safety  Pt performed functional transfers from bedside chair/ BSC with mod Ax1; SBA of another for safety  Pt continues to require cues for hand placement and sequencing  Pt took small steps to [facility] with mod Ax1 + SBA of another for safety- pt fatigues quickly  Pt requires increased time on BSC; pt with increased fatigue noted  Pt able to tolerate standing ~3-4 min with mod A to perform hygiene tasks; cues for weightshifting anteriorly and upright posture  Pt continues to be limited by increased fatigue/ decreased endurance requiring frequent/ extended seated rest breaks  Pt making slow but steady progress toward goals  Pt was left sitting upright in bedside chair with chair alarm on at the end of PT session with all needs in reach  Pt would benefit from continued PT services while in hospital to address remaining limitations  PT to continue to follow pt and recommends post-acute rehab services after d/c  The patient's AM-PAC Basic Mobility Inpatient Short Form Raw Score is 11, Standardized Score is 30 25   A standardized score less than 42 9 suggests the patient may benefit from discharge to post-acute rehabilitation services  Please also refer to the recommendation of the Physical Therapist for safe discharge planning  Barriers to Discharge: Inaccessible home environment,Decreased caregiver support        PT Discharge Recommendation: Post acute rehabilitation services     PT - OK to Discharge: Yes (once medically cleared )    See flowsheet documentation for full assessment

## 2021-11-12 NOTE — ASSESSMENT & PLAN NOTE
Previously with fever coupled with tachycardia and elevated procalcitonin - resolved  Likely secondary to infected left renal hematoma/perinephric abscess -> Fusobacterium bacteremia noted - fluid culture from 10/13 s/p IR-guided drainage growing Enterobacter/Enterococcus -> see antibiotic regimen below

## 2021-11-13 ENCOUNTER — APPOINTMENT (INPATIENT)
Dept: DIALYSIS | Facility: HOSPITAL | Age: 76
DRG: 981 | End: 2021-11-13
Attending: INTERNAL MEDICINE
Payer: COMMERCIAL

## 2021-11-13 ENCOUNTER — TELEPHONE (OUTPATIENT)
Dept: OTHER | Facility: OTHER | Age: 76
End: 2021-11-13

## 2021-11-13 VITALS
HEIGHT: 60 IN | OXYGEN SATURATION: 93 % | HEART RATE: 82 BPM | TEMPERATURE: 98.3 F | SYSTOLIC BLOOD PRESSURE: 102 MMHG | RESPIRATION RATE: 16 BRPM | WEIGHT: 171.74 LBS | BODY MASS INDEX: 33.72 KG/M2 | DIASTOLIC BLOOD PRESSURE: 62 MMHG

## 2021-11-13 LAB
ANION GAP SERPL CALCULATED.3IONS-SCNC: 6 MMOL/L (ref 4–13)
BASOPHILS # BLD AUTO: 0.04 THOUSANDS/ΜL (ref 0–0.1)
BASOPHILS NFR BLD AUTO: 1 % (ref 0–1)
BUN SERPL-MCNC: 20 MG/DL (ref 5–25)
CALCIUM SERPL-MCNC: 8.9 MG/DL (ref 8.3–10.1)
CHLORIDE SERPL-SCNC: 100 MMOL/L (ref 100–108)
CO2 SERPL-SCNC: 29 MMOL/L (ref 21–32)
CREAT SERPL-MCNC: 4.26 MG/DL (ref 0.6–1.3)
EOSINOPHIL # BLD AUTO: 0.04 THOUSAND/ΜL (ref 0–0.61)
EOSINOPHIL NFR BLD AUTO: 1 % (ref 0–6)
ERYTHROCYTE [DISTWIDTH] IN BLOOD BY AUTOMATED COUNT: 18.1 % (ref 11.6–15.1)
GFR SERPL CREATININE-BSD FRML MDRD: 10 ML/MIN/1.73SQ M
GLUCOSE SERPL-MCNC: 84 MG/DL (ref 65–140)
GLUCOSE SERPL-MCNC: 85 MG/DL (ref 65–140)
HCT VFR BLD AUTO: 23.5 % (ref 34.8–46.1)
HGB BLD-MCNC: 7.5 G/DL (ref 11.5–15.4)
IMM GRANULOCYTES # BLD AUTO: 0.06 THOUSAND/UL (ref 0–0.2)
IMM GRANULOCYTES NFR BLD AUTO: 1 % (ref 0–2)
LYMPHOCYTES # BLD AUTO: 0.93 THOUSANDS/ΜL (ref 0.6–4.47)
LYMPHOCYTES NFR BLD AUTO: 18 % (ref 14–44)
MCH RBC QN AUTO: 29.5 PG (ref 26.8–34.3)
MCHC RBC AUTO-ENTMCNC: 31.9 G/DL (ref 31.4–37.4)
MCV RBC AUTO: 93 FL (ref 82–98)
MONOCYTES # BLD AUTO: 0.35 THOUSAND/ΜL (ref 0.17–1.22)
MONOCYTES NFR BLD AUTO: 7 % (ref 4–12)
NEUTROPHILS # BLD AUTO: 3.81 THOUSANDS/ΜL (ref 1.85–7.62)
NEUTS SEG NFR BLD AUTO: 72 % (ref 43–75)
NRBC BLD AUTO-RTO: 0 /100 WBCS
PLATELET # BLD AUTO: 156 THOUSANDS/UL (ref 149–390)
PMV BLD AUTO: 10.4 FL (ref 8.9–12.7)
POTASSIUM SERPL-SCNC: 3.6 MMOL/L (ref 3.5–5.3)
RBC # BLD AUTO: 2.54 MILLION/UL (ref 3.81–5.12)
SODIUM SERPL-SCNC: 135 MMOL/L (ref 136–145)
WBC # BLD AUTO: 5.23 THOUSAND/UL (ref 4.31–10.16)

## 2021-11-13 PROCEDURE — 82948 REAGENT STRIP/BLOOD GLUCOSE: CPT

## 2021-11-13 PROCEDURE — 80048 BASIC METABOLIC PNL TOTAL CA: CPT | Performed by: INTERNAL MEDICINE

## 2021-11-13 PROCEDURE — 90935 HEMODIALYSIS ONE EVALUATION: CPT | Performed by: INTERNAL MEDICINE

## 2021-11-13 PROCEDURE — 99239 HOSP IP/OBS DSCHRG MGMT >30: CPT | Performed by: INTERNAL MEDICINE

## 2021-11-13 PROCEDURE — 85025 COMPLETE CBC W/AUTO DIFF WBC: CPT | Performed by: INTERNAL MEDICINE

## 2021-11-13 RX ORDER — MIDODRINE HYDROCHLORIDE 5 MG/1
5 TABLET ORAL
Refills: 0
Start: 2021-11-13 | End: 2021-12-06 | Stop reason: SDUPTHER

## 2021-11-13 RX ORDER — PANTOPRAZOLE SODIUM 40 MG/1
40 TABLET, DELAYED RELEASE ORAL
Refills: 0
Start: 2021-11-13 | End: 2021-12-06 | Stop reason: SDUPTHER

## 2021-11-13 RX ORDER — MIDODRINE HYDROCHLORIDE 10 MG/1
10 TABLET ORAL DAILY PRN
Refills: 0
Start: 2021-11-13 | End: 2021-11-24 | Stop reason: ALTCHOICE

## 2021-11-13 RX ORDER — MIDODRINE HYDROCHLORIDE 5 MG/1
5 TABLET ORAL
Status: DISCONTINUED | OUTPATIENT
Start: 2021-11-13 | End: 2021-11-13 | Stop reason: HOSPADM

## 2021-11-13 RX ADMIN — CALCITRIOL 0.25 MCG: 0.25 CAPSULE, LIQUID FILLED ORAL at 07:35

## 2021-11-13 RX ADMIN — LOPERAMIDE HYDROCHLORIDE 2 MG: 2 CAPSULE ORAL at 07:34

## 2021-11-13 RX ADMIN — CHOLECALCIFEROL TAB 10 MCG (400 UNIT) 800 UNITS: 10 TAB at 07:35

## 2021-11-13 RX ADMIN — PANTOPRAZOLE SODIUM 40 MG: 40 TABLET, DELAYED RELEASE ORAL at 06:09

## 2021-11-13 RX ADMIN — METOPROLOL TARTRATE 25 MG: 25 TABLET, FILM COATED ORAL at 07:34

## 2021-11-13 RX ADMIN — CITALOPRAM HYDROBROMIDE 20 MG: 20 TABLET ORAL at 07:34

## 2021-11-13 RX ADMIN — APIXABAN 2.5 MG: 2.5 TABLET, FILM COATED ORAL at 07:34

## 2021-11-13 RX ADMIN — MIDODRINE HYDROCHLORIDE 10 MG: 5 TABLET ORAL at 09:05

## 2021-11-13 RX ADMIN — LEVOTHYROXINE SODIUM 75 MCG: 75 TABLET ORAL at 07:34

## 2021-11-13 RX ADMIN — Medication 250 MG: at 07:34

## 2021-11-13 NOTE — PLAN OF CARE
Post-Dialysis RN Treatment Note    Blood Pressure:  Pre 111/67  mm/Hg  Post 97/52 mmHg   EDW  TBD kg    Weight:  Pre 79 2 kg   Post 77 3 kg   Mode of weight measurement: Bed Scale   Volume Removed  2000 ml    Treatment duration 210 minutes    NS given  no    Treatment shortened?  No   Medications given during Rx   Midodrine 10 mg   Estimated Kt/V  1 75   Access type: AV fistula   Access Status: Yes, describe: maintained  during tx    Report called to primary nurse   Yes Deirdre Castro RN  For 3 5 hr tx, 1 3L net fluid removal, 4K bath  Problem: METABOLIC, FLUID AND ELECTROLYTES - ADULT  Goal: Electrolytes maintained within normal limits  Description: INTERVENTIONS:  - Monitor labs and assess patient for signs and symptoms of electrolyte imbalances  - Administer electrolyte replacement as ordered  - Monitor response to electrolyte replacements, including repeat lab results as appropriate  - Instruct patient on fluid and nutrition as appropriate  Outcome: Progressing  Goal: Fluid balance maintained  Description: INTERVENTIONS:  - Monitor labs   - Monitor I/O and WT  - Instruct patient on fluid and nutrition as appropriate  - Assess for signs & symptoms of volume excess or deficit  Outcome: Progressing

## 2021-11-13 NOTE — DISCHARGE SUMMARY
Discharge Summary - [facility]  Patient: Andrea Kong 77 y o  female   MRN: [id number]  PCP: Dominga Sal MD  Unit/Bed#: [facility] 929-01 Encounter: [id number]            Discharging Physician / Practitioner: Lilian Thomason MD  PCP: Dominga Sal MD  Admission Date:   Admission Orders (From admission, onward)     Ordered        10/09/21 [facility]  Once                      Discharge Date: 11/13/21      Reason for Admission:  Abdominal pain      Discharge Diagnoses:     Principal Problem:    Sepsis on admission (resolved)    Active Problems:    Acute renal failure superimposed on CKD stage 5 now dialysis dependent    Perinephric abscess    Obstructive uropathy    Swelling of right upper extremity    Bacteremia    Essential hypertension    History of pulmonary embolism    Hypothyroidism    Anemia of chronic disease    Polycythemia vera     Diarrhea      Consultations During Hospital Stay:  · Interventional radiology  · Vascular surgery  · Infectious Disease  · Urology  · Nephrology  · Medical oncology  · General surgery  · Cardiology  · Gastroenterology      Hospital Course:     * Sepsis on admission  Assessment & Plan  Previously with fever coupled with tachycardia and elevated procalcitonin - resolved  Likely secondary to infected left renal hematoma/perinephric abscess -> Fusobacterium bacteremia noted - fluid culture from 10/13 s/p IR-guided drainage growing Enterobacter/Enterococcus -> see antibiotic regimen below     Perinephric abscess  Assessment & Plan  Presented with left renal hematoma  Pigtail catheter is noted medial to kidney  Renal pelvis may be collapsed  Hemorrhage and thickening extending in the combined interfascial place of retrospective as well as some high density fluid within     S/p IR drainage 10/12 with JOSE ANTONIO drain placement x 2  S/p repeat IR drainage 11/1 w/ negative cultures  Repeat CT imaging on 11/8 revealed resolution perinephric abscess and improvement/resolution of various other noted abscesses on report - s/p removal of JOSE ANTONIO drains - discussed w/ Infectious Disease on 11/9 and antibiotic regimen discontinued      Acute renal failure superimposed on CKD stage 5  Assessment & Plan  Transitioned to ESRD now dialysis dependent - nephrology following  See plan for associated perinephric abscess and underlying obstructive uropathy below  Plan for discharge to skilled rehab today after dialysis session as outpatient dialysis chair time has now been set up      Bacteremia  Assessment & Plan  Blood cultures from 10/9 growing Fusobacterium - discontinued IV Zosyn regimen after completion  Repeat blood cultures 10/12 are negative  Appreciated Infectious Disease evaluation     Swelling of right upper extremity  Assessment & Plan  Underwent repeat RUE vascular doppler study noting "a narrowing in the subclavian vein at the clavicle created elevated PSV and turbulent flow  The dialysis AVF appears to be matured with adequate diameter, flow volumes and less than 6mm in depth throughout the arm  Antegrade, high resistant blunted flow noted in the peripheral arteries  No evidence of outflow obstruction   No evidence of a pseudoaneurysm   No evidence of a large venous branch "  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  S/p fistulagram  Improved with hemodialysis  IR recommendedcompression w/ ACE bandage - no need for repeat fistulogram  Appreciate PT/OT input w/ recommendation of skilled rehab     Obstructive uropathy  Assessment & Plan  Chronic bilateral hydronephrosis w/ recent removal of stents  Appreciated urology and nephrology input     Essential hypertension  Assessment & Plan  Continue Lopressor     History of pulmonary embolism  Assessment & Plan  Continue Eliquis     Hypothyroidism  Assessment & Plan  C/w Synthroid      Anemia of chronic disease  Assessment & Plan  S/H stable  S/p PRBC transfusion on 10/29     Polycythemia vera   Assessment & Plan  With associated history of MDS  Follows outpatient hematology  Currently off a Jakafi regimen for polycythemia vera - undergoes Aranesp on every four weeks     Diarrhea  Assessment & Plan  Chronic issue  PRN Imodium on board during hospital course       Condition at Discharge: fair       Discharge Day Visit / Exam:     Vitals: Blood Pressure: 97/52 (11/13/21 1158)  Pulse: 82 (11/13/21 1158)  Temperature: (!) 97 4 °F (36 3 °C) (11/13/21 1158)  Temp Source: Oral (11/13/21 1158)  Respirations: 18 (11/13/21 1158)  Height: 5' (152 4 cm) (10/15/21 1245)  Weight - Scale: 77 9 kg (171 lb 11 8 oz) (11/11/21 1439)  SpO2: 93 % (11/13/21 0732)      Physical exam - I had a face-to-face encounter with the patient on day of discharge  Discussion with Patient and/or Family:  The patient has been advised to return to the ER immediately if any symptoms recur or worsen  Discharge instructions/Information to Patient and/or Family:   See after visit summary for information provided to patient and/or family  Provisions for Follow-Up Care:  See after visit summary for information related to follow-up care and any pertinent home health orders  Disposition:   Skilled rehab facility      Discharge Medications:  See after visit summary for reconciled discharge medications provided to patient and/or family  Discharge Statement:  I spent 38 minutes discharging the patient  This time was spent on the day of discharge  I had direct contact with the patient on the day of discharge  Greater than 50% of the total time was spent examining patient, answering all patient questions, arranging and discussing plan of care with patient as well as directly providing post-discharge instructions  Additional time then spent on discharge activities  ** Please Note: This note is constructed using a voice recognition dictation system    An occasional wrong word/phrase or sound-a-like substitution may have been picked up by dictation device due to the inherent limitations of voice recognition software  Read the chart carefully and recognize, using reasonable context, where substitutions may have occurred  **

## 2021-11-13 NOTE — PROGRESS NOTES
Follow up Consultation    Nephrology   Sharon Granda 77 y o  female MRN: [id number]  Unit/Bed#: [facility] 929-01 Encounter: [id number]      Physician Requesting Consult: Marsha Chan MD        ASSESSMENT/PLAN:      Rashida Sommer ESRD:   - gets usual dialysis on TTS schedule at this time  - started on dialysis this admission  And deemed ESRD  - access:  Right AV fistula, left IJ PermCath  - PermCath was placed on 10/20/2021  - right arm AV fistula cannulated 11/09/2021 with 15 gauge needles  - plan is to remove PermCath if right arm AV fistula uses stable for 2 weeks from start date  - creatinine had peaked at 6 78 mg/dL on 10/20/2021 and patient was started on dialysis on 10/20/2021   - estimated dry weight:  To be determined  - last dialysis treatment on 11/13/2021  - next dialysis treatment on 11/16/2021  - check BMP, phosphorus with dialysis  - aiming for UF close to 2 kilos with treatment today  - renal diet when allowed diet order  - avoid nephrotoxins, adjust meds to appropriate GFR  - awaiting outpatient dialysis placement  [place] with discharge likely to sniff  - had history of CKD stage 5 with baseline creatinine 3-3 5 mg/dL and was following outpatient with Dr monterroso     H&H/anemia:  - most recent hemoglobin at 7 5 grams/deciliter  - maintain hemoglobin 10-12 grams/deciliter  - Recommendations:  Avoid JENELLE due to history of PE  Holding off on Venofer due to elevated ferritin levels and infection recent  Continue to monitor for now     Acid-base electrolytes:  o Sodium, phosphorus, potassium, acidosis to be corrected with dialysis  o Hyponatremia:  Most recent serum sodium 135 mEq to be corrected with dialysis     Blood pressure/hypertension:   o Current medications:  Metoprolol 25 mg p o  B i d  midodrine 5 mg p o  T i d  Hold for SBP greater than 110  o Recommendations:  Continue challenge dry weight increase UF with treatment, place on midodrine 10 mg p o  P r n   If SBP less than 80     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:   o Recommendations:  Continue calcitriol 0 25 mcg p o  Q day  o Follow-up intact PTH as an outpatient     Other medical problems:  o Sepsis due to infected left renal hematoma/perinephric abscess was chronic obstructive uropathy:  Management primary team, follow-up with ID  Repeat CT21 showing improvements now off of antibiotics  o Use of bacterium bacteremia:  Management per primary team   Follow-up with ID completed antibiotics        Thanks for the consult  Will continue to follow  Please call with questions  Above-mentioned orders and Plan in terms of dialysis was discussed with the team in [address] MD, FASN, 2021, 12:16 PM        Objective :  Patient seen and examined while on hemodialysis at 9:10 a m  Hemodynamically stable aiming for UF close to 2 kilos tolerating dialysis well via right AV fistula  PHYSICAL EXAM  BP 97/52   Pulse 82   Temp (!) 97 4 °F (36 3 °C) (Oral)   Resp 18   Ht 5' (1 524 m)   Wt 77 9 kg (171 lb 11 8 oz)   SpO2 93%   BMI 33 54 kg/m²   Temp (24hrs), Av 9 °F (36 6 °C), Min:97 4 °F (36 3 °C), Max:98 2 °F (36 8 °C)        Intake/Output Summary (Last 24 hours) at 2021 1216  Last data filed at 2021 1158  Gross per 24 hour   Intake 740 ml   Output 2550 ml   Net -1810 ml       I/O last 24 hours: In: 1140 [P O :640; I V :200; Other:300]  Out: 2550 [Urine:50; Other:2500]      Current Weight: Weight - Scale: 77 9 kg (171 lb 11 8 oz)  First Weight: Weight - Scale: 86 5 kg (190 lb 11 2 oz)  Physical Exam  Vitals and nursing note reviewed  Constitutional:       General: She is not in acute distress  Appearance: Normal appearance  She is obese  She is ill-appearing  She is not toxic-appearing or diaphoretic  HENT:      Head: Normocephalic and atraumatic  Mouth/Throat:      Mouth: Mucous membranes are moist       Pharynx: Oropharynx is clear   No oropharyngeal exudate  Eyes:      General: No scleral icterus  Conjunctiva/sclera: Conjunctivae normal    Neck:      Comments: PermCath in place  Cardiovascular:      Rate and Rhythm: Normal rate  Heart sounds: Normal heart sounds  No friction rub  Pulmonary:      Effort: Pulmonary effort is normal  No respiratory distress  Breath sounds: Normal breath sounds  No wheezing  Abdominal:      General: There is no distension  Palpations: Abdomen is soft  There is no mass  Tenderness: There is no abdominal tenderness  Musculoskeletal:         General: Swelling present  Cervical back: Normal range of motion and neck supple  Comments: Plus one right upper extremity edema trace edema bilateral lower extremities   Skin:     General: Skin is warm  Coloration: Skin is not jaundiced  Neurological:      General: No focal deficit present  Mental Status: She is alert and oriented to person, place, and time  Psychiatric:         Mood and Affect: Mood normal          Behavior: Behavior normal              Review of Systems   Constitutional: Negative for appetite change, chills and fever  HENT: Negative for congestion  Respiratory: Negative for cough, shortness of breath and wheezing  Cardiovascular: Positive for leg swelling  Gastrointestinal: Negative for abdominal pain, constipation, diarrhea, nausea and vomiting  Musculoskeletal: Negative for back pain  Skin: Negative for pallor  Neurological: Negative for headaches  Psychiatric/Behavioral: Negative for agitation  All other systems reviewed and are negative        Scheduled Meds:  Current Facility-Administered Medications   Medication Dose Route Frequency Provider Last Rate    acetaminophen  650 mg Oral Q6H PRN Shavon Hernandez MD      apixaban  2 5 mg Oral BID Char Conklin DO      atorvastatin  40 mg Oral HS Shavon Hernandez MD      calcitriol  0 25 mcg Oral Daily MD Jourdan Morillo cholecalciferol  800 Units Oral Daily Michelle Vale MD      citalopram  20 mg Oral Daily Michelle Vale MD      levothyroxine  75 mcg Oral Daily Michelle Vale MD      loperamide  2 mg Oral 4x Daily PRN Duglas Morgan PA-C      melatonin  3 mg Oral HS Michelle Vale MD      metoprolol  2 5 mg Intravenous Q6H PRN Duglas Morgan PA-C      metoprolol tartrate  25 mg Oral BID Duglas Morgan PA-C      midodrine  10 mg Oral Daily PRN Ortiz Ingram MD      midodrine  5 mg Oral TID AC Ortiz Ingram MD      ondansetron  4 mg Intravenous Q6H PRN Michelle Vale MD      pantoprazole  40 mg Oral BID AC Mumtaz Laguerre DO      saccharomyces boulardii  250 mg Oral Daily Michelle Vale MD         PRN Meds:   acetaminophen    loperamide    metoprolol    midodrine    ondansetron    Continuous Infusions:       Invasive Devices:    Invasive Devices  Report    Peripherally Inserted Central Catheter Line            PICC Line 10/11/21 32 days          Line            Hemodialysis AV Fistula 09/30/21 Right Upper arm 44 days          Hemodialysis Catheter            HD Permanent Double Catheter 23 days          Drain            Urostomy Ileal conduit RUQ 1865 days                  LABORATORY:    Results from last 7 days   Lab Units 11/13/21  0536 11/12/21  0616 11/11/21  0435 11/10/21  0455 11/09/21  0531 11/08/21  0458   WBC Thousand/uL 5 23 5 23 5 23 5 54 4 95 5 11   HEMOGLOBIN g/dL 7 5* 7 8* 7 8* 7 9* 7 8* 7 7*   HEMATOCRIT % 23 5* 24 4* 24 8* 25 3* 25 4* 24 7*   PLATELETS Thousands/uL 156 164 180 185 193 177   POTASSIUM mmol/L 3 6 3 6 3 5 3 6 3 4* 3 7   CHLORIDE mmol/L 100 100 102 102 105 104   CO2 mmol/L 29 30 29 27 25 28   BUN mg/dL 20 12 21 13 26* 19   CREATININE mg/dL 4 26* 2 88* 4 55* 3 51* 5 23* 4 40*   CALCIUM mg/dL 8 9 8 4 8 4 8 4 8 6 8 7      rest all reviewed    RADIOLOGY:  CT abdomen pelvis wo contrast   Final Result by Reyes Grass, MD (11/08 8083)      Complete interval collapse of subcapsular left perinephric abscess in which a pigtail drainage catheter had been placed  Complete resolution of abscesses along the anterior margin of the peritoneal wall in the left paracolic gutter surrounding a pigtail drainage catheter  Decreasing size of loculated probable abscess in the posterior cul-de-sac and in the central pelvis  Again noted is extensive bilateral renal scarring more severe on the left than on the right  No hydronephrosis  Left pleural effusion  Cardiomegaly  Lobulated cyst in the upper lateral right hemipelvis possibly representing seroma unchanged over multiple prior examinations  Workstation performed: [id number]         IR drainage tube placement   Final Result by Maura Ovalels MD (11/01 2227)   Impression: Successful placement of a therapeutic 10 Frisian all-purpose drainage catheter into the left subcapsular/perinephric abscess collection  A total of 5 cc of pus was aspirated and a specimen sent to the laboratory as described  Workstation performed: [id number]         CT abdomen pelvis wo contrast   Final Result by Elvi Villatoro MD (10/27 1839)      Persistent left perinephric collection with a surrounding rind suspicious for perinephric abscess  Resolved left paracolic gutter collection; a pigtail catheter is in place  Thickened rind surrounding a pelvic posterior cul-de-sac collection  Unchanged thin-walled right iliac fossa collection  The study was marked in [facility] for immediate notification  Workstation performed: [id number]         IR AV fistulagram/graftogram   Final Result by Rylie Vines MD (10/27 1722)   Impression:       Recurrent subclavian vein stenosis dilated with 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 with 50% residual narrowing  The patient may require a stent in the future after removal of the central venous catheter        Successful angioplasty of a mid basilic vein stenosis  Workstation performed: [id number]         IR drainage tube check/change/reposition/reinsertion/upsize   Final Result by Sylvie Knutson MD (10/27 1712)   Impression:       Successful exchange of the perinephric drain  The more anterior drain was dislodged and tract was occluded  Workstation performed: Marcela Ramsey right upper quadrant   Final Result by Johnnie Jiménez MD (10/22 1084)      Nonobstructive right renal calculi  Otherwise unremarkable sonographic study  Please note CT of 10/13/2021 demonstrated a left kidney subcapsular collection and left abdominal and right lower quadrant collection  Workstation performed: [id number]         IR tunneled dialysis catheter placement   Final Result by Sylvie Knutson MD (10/21 3568)   Impression:      Successful placement of a left internal jugular vein 32 cm tunneled dialysis catheter  Workstation performed: [id number]         US kidney and bladder   Final Result by Maddie Potts MD (10/20 3857)      1  Mild right upper pole caliectasis, difficult to compare to prior CT, but likely present to some degree on that study  Multiple nonobstructing right renal calculi  2   Difficult visualization of the left kidney due to body habitus  No gross hydronephrosis  Partially visualized left perinephric drainage catheter with likely some residual perinephric collection, noting poor evaluation  3   Unchanged 7 6 cm simple right lower quadrant fluid collection and fluid collection versus free fluid in the cul-de-sac, as seen on prior CT  Workstation performed: [id number]         VAS hemodialysis access duplex right upper limb avf   Final Result by Arvind Damon MD (10/20 2209)      XR chest portable   Final Result by Slyvie Knutson MD (10/18 3923)      Persistent left pleural disease                    Workstation performed: [id number]         IR drainage tube placement   Final Result by El Holman MD (10/13 2041)   Impression:       Image guided 10 Nigerian drain placement into a left flank/paracolic gutter collection       Image guided 10 Nigerian drain placement into a left perinephric collection         Workstation performed: [id number]         CT chest abdomen pelvis wo contrast   Final Result by Mike Ruiz MD (10/13 9873)      There is a increasing dilation of the small bowel loops in the upper to mid abdomen with the zone of transition suggest partial small bowel obstruction       New loculated fluid collection in the left paracolic gutter measuring 6 8 x 3 6 x 12 6 cm      Additional loculated fluid collection within the pelvic cul-de-sac, stable   Previously noted the postoperative right iliac fossa collection likely related to suggest lymphocele/seroma, stable      Retrograde the left nephroureteral stent with resolution of the left hydronephrosis      Subcapsular fluid collection left kidney, moderate on previous study      No new collection with the air-fluid level      No airspace consolidations lungs   Area of groundglass density seen in the posterior aspect of the left upper lobe may be due to atelectasis/pneumonitis       I personally discussed this study with Vinod Guardado on 10/13/2021 at 2:29 PM                Workstation performed: [id number]         IR IN-Patient Thoracentesis   Final Result by El Holman MD (10/12 2214)   Impression:      Ultrasound-guided left thoracentesis      Small effusion which was difficult to aspirate  Workstation performed: [id number]         IR non-tunneled central line placement   Final Result by El Holman MD (10/12 2216)   Impression:       Image guided placement of a nontunneled 5 Nigerian double-lumen power injectable central venous catheter via the right external jugular vein                                Workstation performed: [id number]         VAS hemodialysis access duplex right upper limb avf   Final Result by Zaida Vasquez MD (64/77 7443)      CT abdomen pelvis wo contrast   Final Result by Vaughn Christine MD (10/09 1623)      In the left renal fossa, there is a collection of mixed intermediate and high attenuation suggesting a hematoma with some bubbles of gas  Pigtail catheter is noted medial to the kidney  The renal pelvis may be collapsed  There appears to be some    hemorrhage and thickening extending in the combined interfascial plane of the retroperitoneum as well as some high density fluid within the pelvis suggesting hemoperitoneum  Superinfection of the kidney is possible  Urology evaluation is advised  Nonobstructing calculi in the right kidney  Some prominent loops of bowel are more likely large bowel suggesting ileus  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  This was discussed with Dr Obie Washington at 4:20 PM               Workstation performed: [id number]         XR chest portable   Final Result by Lainey Hodgson MD (10/10 6599)      Medial left base slight atelectasis and or small infiltrate  Small left effusion      The study was marked in [employer] for immediate notification  Workstation performed: [id number]         IR drainage tube check/change/reposition/reinsertion/upsize    (Results Pending)     Rest all reviewed    Portions of the record may have been created with voice recognition software  Occasional wrong word or "sound a like" substitutions may have occurred due to the inherent limitations of voice recognition software  Read the chart carefully and recognize, using context, where substitutions have occurred  If you have any questions, please contact the dictating provider

## 2021-11-15 ENCOUNTER — NURSING HOME VISIT (OUTPATIENT)
Dept: GERIATRICS | Facility: OTHER | Age: 76
End: 2021-11-15
Payer: COMMERCIAL

## 2021-11-15 DIAGNOSIS — I26.92 CHRONIC SADDLE PULMONARY EMBOLISM WITHOUT ACUTE COR PULMONALE (HCC): ICD-10-CM

## 2021-11-15 DIAGNOSIS — Z99.2 ESRD (END STAGE RENAL DISEASE) ON DIALYSIS (HCC): Primary | ICD-10-CM

## 2021-11-15 DIAGNOSIS — N18.6 ESRD (END STAGE RENAL DISEASE) ON DIALYSIS (HCC): Primary | ICD-10-CM

## 2021-11-15 DIAGNOSIS — D45 POLYCYTHEMIA VERA (HCC): ICD-10-CM

## 2021-11-15 DIAGNOSIS — I10 PRIMARY HYPERTENSION: ICD-10-CM

## 2021-11-15 DIAGNOSIS — E03.9 ACQUIRED HYPOTHYROIDISM: ICD-10-CM

## 2021-11-15 DIAGNOSIS — I27.82 CHRONIC SADDLE PULMONARY EMBOLISM WITHOUT ACUTE COR PULMONALE (HCC): ICD-10-CM

## 2021-11-15 PROCEDURE — 99306 1ST NF CARE HIGH MDM 50: CPT | Performed by: FAMILY MEDICINE

## 2021-11-15 NOTE — UTILIZATION REVIEW
Notification of Discharge   This is a Notification of Discharge from our facility [address]  Please be advised that this patient has been discharge from our facility  Below you will find the admission and discharge date and time including the patients disposition  UTILIZATION REVIEW CONTACT:  Mark Silverman  Utilization   Network Utilization Review Department  Phone: [phone] x carefully listen to the prompts  All voicemails are confidential   Email: [email or web address]     PHYSICIAN ADVISORY SERVICES:  FOR [id number] REVIEW - MEDICAL NECESSITY DENIAL  Phone: [phone]  Fax: [phone]  Email: [email or web address]     PRESENTATION DATE: 10/9/2021  2:19 PM  OBERVATION ADMISSION DATE:   INPATIENT ADMISSION DATE: 10/9/21  6:35 PM   DISCHARGE DATE: 11/13/2021  5:33 PM  DISPOSITION: Non [facility]/SNU Non [facility]/TC[facility]/SNU      IMPORTANT INFORMATION:  Send all requests for admission clinical reviews, approved or denied determinations and any other requests to dedicated fax number below belonging to the campus where the patient is receiving treatment   List of dedicated fax numbers:  [address] [place] [address] DENIALS (Administrative/Medical Necessity) [phone]   [address] [place] [address] (Maternity/NICU/Pediatrics) [phone]   Almas Unger [phone]   [address] [facility] [phone]   [address] [place] [address] [phone]   [facility] [address] [place] [address] [phone]   [facility]  [phone]   [address] [place] [address], [address]  [address] [facility] [address] [facility] [phone]

## 2021-11-16 ENCOUNTER — TELEPHONE (OUTPATIENT)
Dept: RADIOLOGY | Facility: HOSPITAL | Age: 76
End: 2021-11-16

## 2021-11-16 RX ORDER — SODIUM CHLORIDE 9 MG/ML
75 INJECTION, SOLUTION INTRAVENOUS CONTINUOUS
Status: CANCELLED | OUTPATIENT
Start: 2021-11-16

## 2021-11-18 ENCOUNTER — NURSING HOME VISIT (OUTPATIENT)
Dept: GERIATRICS | Facility: OTHER | Age: 76
End: 2021-11-18
Payer: COMMERCIAL

## 2021-11-18 DIAGNOSIS — N18.6 ESRD (END STAGE RENAL DISEASE) ON DIALYSIS (HCC): Primary | ICD-10-CM

## 2021-11-18 DIAGNOSIS — K59.01 SLOW TRANSIT CONSTIPATION: ICD-10-CM

## 2021-11-18 DIAGNOSIS — Z99.2 ESRD (END STAGE RENAL DISEASE) ON DIALYSIS (HCC): Primary | ICD-10-CM

## 2021-11-18 DIAGNOSIS — F32.A DEPRESSION, UNSPECIFIED DEPRESSION TYPE: ICD-10-CM

## 2021-11-18 DIAGNOSIS — N13.9 OBSTRUCTIVE UROPATHY: ICD-10-CM

## 2021-11-18 DIAGNOSIS — R26.2 AMBULATORY DYSFUNCTION: ICD-10-CM

## 2021-11-18 PROCEDURE — 99309 SBSQ NF CARE MODERATE MDM 30: CPT | Performed by: NURSE PRACTITIONER

## 2021-11-19 PROBLEM — N18.5 STAGE 5 CHRONIC KIDNEY DISEASE NOT ON CHRONIC DIALYSIS (HCC): Status: RESOLVED | Noted: 2021-08-09 | Resolved: 2021-11-19

## 2021-11-23 ENCOUNTER — NURSING HOME VISIT (OUTPATIENT)
Dept: GERIATRICS | Facility: OTHER | Age: 76
End: 2021-11-23
Payer: COMMERCIAL

## 2021-11-23 DIAGNOSIS — R78.81 BACTEREMIA: Primary | ICD-10-CM

## 2021-11-23 DIAGNOSIS — I26.92 CHRONIC SADDLE PULMONARY EMBOLISM WITHOUT ACUTE COR PULMONALE (HCC): ICD-10-CM

## 2021-11-23 DIAGNOSIS — Z99.2 ESRD (END STAGE RENAL DISEASE) ON DIALYSIS (HCC): ICD-10-CM

## 2021-11-23 DIAGNOSIS — I10 PRIMARY HYPERTENSION: ICD-10-CM

## 2021-11-23 DIAGNOSIS — N18.5 ANEMIA DUE TO STAGE 5 CHRONIC KIDNEY DISEASE, NOT ON CHRONIC DIALYSIS (HCC): ICD-10-CM

## 2021-11-23 DIAGNOSIS — K59.01 SLOW TRANSIT CONSTIPATION: ICD-10-CM

## 2021-11-23 DIAGNOSIS — N18.6 ESRD (END STAGE RENAL DISEASE) ON DIALYSIS (HCC): ICD-10-CM

## 2021-11-23 DIAGNOSIS — N13.9 OBSTRUCTIVE UROPATHY: ICD-10-CM

## 2021-11-23 DIAGNOSIS — R26.2 AMBULATORY DYSFUNCTION: ICD-10-CM

## 2021-11-23 DIAGNOSIS — I27.82 CHRONIC SADDLE PULMONARY EMBOLISM WITHOUT ACUTE COR PULMONALE (HCC): ICD-10-CM

## 2021-11-23 DIAGNOSIS — D63.1 ANEMIA DUE TO STAGE 5 CHRONIC KIDNEY DISEASE, NOT ON CHRONIC DIALYSIS (HCC): ICD-10-CM

## 2021-11-23 DIAGNOSIS — F32.A DEPRESSION, UNSPECIFIED DEPRESSION TYPE: ICD-10-CM

## 2021-11-23 PROCEDURE — 99309 SBSQ NF CARE MODERATE MDM 30: CPT | Performed by: NURSE PRACTITIONER

## 2021-11-24 RX ORDER — POLYETHYLENE GLYCOL 3350 17 G/17G
17 POWDER, FOR SOLUTION ORAL EVERY OTHER DAY
COMMUNITY

## 2021-11-24 RX ORDER — CITALOPRAM 40 MG/1
40 TABLET ORAL DAILY
COMMUNITY
End: 2021-12-06 | Stop reason: SDUPTHER

## 2021-11-24 RX ORDER — SENNA AND DOCUSATE SODIUM 50; 8.6 MG/1; MG/1
1 TABLET, FILM COATED ORAL 2 TIMES DAILY
COMMUNITY

## 2021-11-24 RX ORDER — NICOTINE POLACRILEX 2 MG
1 LOZENGE BUCCAL DAILY
COMMUNITY

## 2021-11-27 ENCOUNTER — TELEPHONE (OUTPATIENT)
Dept: OTHER | Facility: OTHER | Age: 76
End: 2021-11-27

## 2021-12-01 ENCOUNTER — NURSING HOME VISIT (OUTPATIENT)
Dept: GERIATRICS | Facility: OTHER | Age: 76
End: 2021-12-01
Payer: COMMERCIAL

## 2021-12-01 DIAGNOSIS — Z99.2 ESRD (END STAGE RENAL DISEASE) ON DIALYSIS (HCC): ICD-10-CM

## 2021-12-01 DIAGNOSIS — R78.81 BACTEREMIA: Primary | ICD-10-CM

## 2021-12-01 DIAGNOSIS — N18.6 ESRD (END STAGE RENAL DISEASE) ON DIALYSIS (HCC): ICD-10-CM

## 2021-12-01 DIAGNOSIS — K59.01 SLOW TRANSIT CONSTIPATION: ICD-10-CM

## 2021-12-01 DIAGNOSIS — I10 PRIMARY HYPERTENSION: ICD-10-CM

## 2021-12-01 DIAGNOSIS — I27.82 CHRONIC SADDLE PULMONARY EMBOLISM WITHOUT ACUTE COR PULMONALE (HCC): ICD-10-CM

## 2021-12-01 DIAGNOSIS — N13.9 OBSTRUCTIVE UROPATHY: ICD-10-CM

## 2021-12-01 DIAGNOSIS — R26.2 AMBULATORY DYSFUNCTION: ICD-10-CM

## 2021-12-01 DIAGNOSIS — D63.1 ANEMIA DUE TO STAGE 5 CHRONIC KIDNEY DISEASE, NOT ON CHRONIC DIALYSIS (HCC): ICD-10-CM

## 2021-12-01 DIAGNOSIS — N18.5 ANEMIA DUE TO STAGE 5 CHRONIC KIDNEY DISEASE, NOT ON CHRONIC DIALYSIS (HCC): ICD-10-CM

## 2021-12-01 DIAGNOSIS — I26.92 CHRONIC SADDLE PULMONARY EMBOLISM WITHOUT ACUTE COR PULMONALE (HCC): ICD-10-CM

## 2021-12-01 DIAGNOSIS — F32.A DEPRESSION, UNSPECIFIED DEPRESSION TYPE: ICD-10-CM

## 2021-12-01 PROCEDURE — 99309 SBSQ NF CARE MODERATE MDM 30: CPT | Performed by: NURSE PRACTITIONER

## 2021-12-02 ENCOUNTER — TELEPHONE (OUTPATIENT)
Dept: UROLOGY | Facility: AMBULATORY SURGERY CENTER | Age: 76
End: 2021-12-02

## 2021-12-02 ENCOUNTER — TELEPHONE (OUTPATIENT)
Dept: HEMATOLOGY ONCOLOGY | Facility: CLINIC | Age: 76
End: 2021-12-02

## 2021-12-06 ENCOUNTER — NURSING HOME VISIT (OUTPATIENT)
Dept: GERIATRICS | Facility: OTHER | Age: 76
End: 2021-12-06
Payer: COMMERCIAL

## 2021-12-06 DIAGNOSIS — N18.6 ESRD (END STAGE RENAL DISEASE) ON DIALYSIS (HCC): ICD-10-CM

## 2021-12-06 DIAGNOSIS — I27.82 CHRONIC SADDLE PULMONARY EMBOLISM WITHOUT ACUTE COR PULMONALE (HCC): ICD-10-CM

## 2021-12-06 DIAGNOSIS — F32.A DEPRESSION, UNSPECIFIED DEPRESSION TYPE: ICD-10-CM

## 2021-12-06 DIAGNOSIS — I10 PRIMARY HYPERTENSION: ICD-10-CM

## 2021-12-06 DIAGNOSIS — E03.9 ACQUIRED HYPOTHYROIDISM: Primary | ICD-10-CM

## 2021-12-06 DIAGNOSIS — N13.9 OBSTRUCTIVE UROPATHY: ICD-10-CM

## 2021-12-06 DIAGNOSIS — Z99.2 ESRD (END STAGE RENAL DISEASE) ON DIALYSIS (HCC): ICD-10-CM

## 2021-12-06 DIAGNOSIS — R26.2 AMBULATORY DYSFUNCTION: ICD-10-CM

## 2021-12-06 DIAGNOSIS — I26.92 CHRONIC SADDLE PULMONARY EMBOLISM WITHOUT ACUTE COR PULMONALE (HCC): ICD-10-CM

## 2021-12-06 PROCEDURE — 99316 NF DSCHRG MGMT 30 MIN+: CPT | Performed by: NURSE PRACTITIONER

## 2021-12-06 RX ORDER — MIDODRINE HYDROCHLORIDE 5 MG/1
5 TABLET ORAL
Qty: 42 TABLET | Refills: 0 | Status: SHIPPED | OUTPATIENT
Start: 2021-12-06

## 2021-12-06 RX ORDER — CALCITRIOL 0.25 UG/1
0.25 CAPSULE, LIQUID FILLED ORAL DAILY
Qty: 14 CAPSULE | Refills: 0 | Status: SHIPPED | OUTPATIENT
Start: 2021-12-06

## 2021-12-06 RX ORDER — ATORVASTATIN CALCIUM 40 MG/1
40 TABLET, FILM COATED ORAL
Qty: 14 TABLET | Refills: 0 | Status: SHIPPED | OUTPATIENT
Start: 2021-12-06

## 2021-12-06 RX ORDER — CITALOPRAM 40 MG/1
40 TABLET ORAL DAILY
Qty: 14 TABLET | Refills: 0 | Status: SHIPPED | OUTPATIENT
Start: 2021-12-06

## 2021-12-06 RX ORDER — PANTOPRAZOLE SODIUM 40 MG/1
40 TABLET, DELAYED RELEASE ORAL
Qty: 28 TABLET | Refills: 0 | Status: SHIPPED | OUTPATIENT
Start: 2021-12-06

## 2021-12-06 RX ORDER — LEVOTHYROXINE SODIUM 0.07 MG/1
75 TABLET ORAL DAILY
Qty: 14 TABLET | Refills: 0 | Status: SHIPPED | OUTPATIENT
Start: 2021-12-06

## 2022-01-05 ENCOUNTER — APPOINTMENT (OUTPATIENT)
Dept: RADIOLOGY | Facility: HOSPITAL | Age: 77
End: 2022-01-05
Attending: RADIOLOGY
Payer: COMMERCIAL

## 2022-01-05 ENCOUNTER — HOSPITAL ENCOUNTER (OUTPATIENT)
Dept: RADIOLOGY | Facility: HOSPITAL | Age: 77
Discharge: HOME | End: 2022-01-05
Attending: RADIOLOGY | Admitting: RADIOLOGY
Payer: COMMERCIAL

## 2022-01-05 ENCOUNTER — APPOINTMENT (OUTPATIENT)
Dept: LAB | Facility: HOSPITAL | Age: 77
End: 2022-01-05
Attending: INTERNAL MEDICINE
Payer: COMMERCIAL

## 2022-01-05 ENCOUNTER — TELEPHONE (OUTPATIENT)
Dept: CARDIAC SURGERY | Facility: CLINIC | Age: 77
End: 2022-01-05

## 2022-01-05 DIAGNOSIS — D50.0 IRON DEFICIENCY ANEMIA DUE TO CHRONIC BLOOD LOSS: ICD-10-CM

## 2022-01-05 DIAGNOSIS — N13.30 HYDRONEPHROSIS OF LEFT KIDNEY: ICD-10-CM

## 2022-01-05 DIAGNOSIS — D50.9 IRON DEFICIENCY ANEMIA, UNSPECIFIED IRON DEFICIENCY ANEMIA TYPE: ICD-10-CM

## 2022-01-05 DIAGNOSIS — D45 POLYCYTHEMIA VERA (HCC): ICD-10-CM

## 2022-01-05 DIAGNOSIS — D63.1 ANEMIA DUE TO STAGE 5 CHRONIC KIDNEY DISEASE, NOT ON CHRONIC DIALYSIS (HCC): ICD-10-CM

## 2022-01-05 DIAGNOSIS — N18.5 ANEMIA DUE TO STAGE 5 CHRONIC KIDNEY DISEASE, NOT ON CHRONIC DIALYSIS (HCC): ICD-10-CM

## 2022-01-05 LAB
ALBUMIN SERPL BCP-MCNC: 2.7 G/DL (ref 3.5–5)
ALP SERPL-CCNC: 100 U/L (ref 46–116)
ALT SERPL W P-5'-P-CCNC: 11 U/L (ref 12–78)
ANION GAP SERPL CALCULATED.3IONS-SCNC: 7 MMOL/L (ref 4–13)
AST SERPL W P-5'-P-CCNC: 32 U/L (ref 5–45)
BASOPHILS # BLD AUTO: 0.04 THOUSANDS/ΜL (ref 0–0.1)
BASOPHILS NFR BLD AUTO: 1 % (ref 0–1)
BILIRUB SERPL-MCNC: 1.87 MG/DL (ref 0.2–1)
BUN SERPL-MCNC: 6 MG/DL (ref 5–25)
CALCIUM ALBUM COR SERPL-MCNC: 10.4 MG/DL (ref 8.3–10.1)
CALCIUM SERPL-MCNC: 9.4 MG/DL (ref 8.3–10.1)
CHLORIDE SERPL-SCNC: 100 MMOL/L (ref 100–108)
CO2 SERPL-SCNC: 30 MMOL/L (ref 21–32)
CREAT SERPL-MCNC: 2.47 MG/DL (ref 0.6–1.3)
EOSINOPHIL # BLD AUTO: 0.04 THOUSAND/ΜL (ref 0–0.61)
EOSINOPHIL NFR BLD AUTO: 1 % (ref 0–6)
ERYTHROCYTE [DISTWIDTH] IN BLOOD BY AUTOMATED COUNT: 15.7 % (ref 11.6–15.1)
GFR SERPL CREATININE-BSD FRML MDRD: 18 ML/MIN/1.73SQ M
GLUCOSE P FAST SERPL-MCNC: 98 MG/DL (ref 65–99)
HCT VFR BLD AUTO: 34.1 % (ref 34.8–46.1)
HGB BLD-MCNC: 10.5 G/DL (ref 11.5–15.4)
IMM GRANULOCYTES # BLD AUTO: 0.03 THOUSAND/UL (ref 0–0.2)
IMM GRANULOCYTES NFR BLD AUTO: 1 % (ref 0–2)
INR PPP: 1.18 (ref 0.84–1.19)
LYMPHOCYTES # BLD AUTO: 0.89 THOUSANDS/ΜL (ref 0.6–4.47)
LYMPHOCYTES NFR BLD AUTO: 20 % (ref 14–44)
MCH RBC QN AUTO: 29.8 PG (ref 26.8–34.3)
MCHC RBC AUTO-ENTMCNC: 30.8 G/DL (ref 31.4–37.4)
MCV RBC AUTO: 97 FL (ref 82–98)
MONOCYTES # BLD AUTO: 0.34 THOUSAND/ΜL (ref 0.17–1.22)
MONOCYTES NFR BLD AUTO: 8 % (ref 4–12)
NEUTROPHILS # BLD AUTO: 3.21 THOUSANDS/ΜL (ref 1.85–7.62)
NEUTS SEG NFR BLD AUTO: 69 % (ref 43–75)
NRBC BLD AUTO-RTO: 0 /100 WBCS
PLATELET # BLD AUTO: 200 THOUSANDS/UL (ref 149–390)
PMV BLD AUTO: 9.8 FL (ref 8.9–12.7)
POTASSIUM SERPL-SCNC: 2.9 MMOL/L (ref 3.5–5.3)
PROT SERPL-MCNC: 6.4 G/DL (ref 6.4–8.2)
PROTHROMBIN TIME: 14.6 SECONDS (ref 11.6–14.5)
RBC # BLD AUTO: 3.52 MILLION/UL (ref 3.81–5.12)
SODIUM SERPL-SCNC: 137 MMOL/L (ref 136–145)
WBC # BLD AUTO: 4.55 THOUSAND/UL (ref 4.31–10.16)

## 2022-01-05 PROCEDURE — 75984 XRAY CONTROL CATHETER CHANGE: CPT

## 2022-01-05 PROCEDURE — 82728 ASSAY OF FERRITIN: CPT

## 2022-01-05 PROCEDURE — 83550 IRON BINDING TEST: CPT

## 2022-01-05 PROCEDURE — 85610 PROTHROMBIN TIME: CPT

## 2022-01-05 PROCEDURE — 80053 COMPREHEN METABOLIC PANEL: CPT

## 2022-01-05 PROCEDURE — C1729 CATH, DRAINAGE: HCPCS

## 2022-01-05 PROCEDURE — 36415 COLL VENOUS BLD VENIPUNCTURE: CPT

## 2022-01-05 PROCEDURE — 50688 CHANGE OF URETER TUBE/STENT: CPT

## 2022-01-05 PROCEDURE — C1769 GUIDE WIRE: HCPCS

## 2022-01-05 PROCEDURE — 50688 CHANGE OF URETER TUBE/STENT: CPT | Performed by: RADIOLOGY

## 2022-01-05 PROCEDURE — 85025 COMPLETE CBC W/AUTO DIFF WBC: CPT

## 2022-01-05 PROCEDURE — 83540 ASSAY OF IRON: CPT

## 2022-01-05 PROCEDURE — 75984 XRAY CONTROL CATHETER CHANGE: CPT | Performed by: RADIOLOGY

## 2022-01-05 RX ORDER — CIPROFLOXACIN 250 MG/1
TABLET, FILM COATED ORAL CODE/TRAUMA/SEDATION MEDICATION
Status: COMPLETED | OUTPATIENT
Start: 2022-01-05 | End: 2022-01-05

## 2022-01-05 RX ADMIN — CIPROFLOXACIN HYDROCHLORIDE 500 MG: 250 TABLET, FILM COATED ORAL at 10:44

## 2022-01-05 NOTE — DISCHARGE INSTRUCTIONS
Nephrostomy Tube Care     WHAT YOU NEED TO KNOW:   A nephrostomy tube is a catheter (thin plastic tube) that is inserted through your skin and into your kidney  The nephrostomy tube drains urine from your kidney into a collecting bag outside your body  You may need a nephrostomy tube when something is blocking the normal flow of urine  A nephrostomy tube may be used for a short or a long period of time  The nephrostomy tube comes out of your back, so you will need someone to help care for your nephrostomy tube  DISCHARGE INSTRUCTIONS:      How to clean the skin around the nephrostomy tube and change the bandage:  Since the nephrostomy tube comes out of your back, you will not be able to care for it by yourself  Ask someone to follow the general directions below to check and care for your nephrostomy tube  Gather the items you will need  Disposable (single use) under-pad, and a clean washcloth  ¨ Plain soap, warm water, and new medical gloves  ¨ Sterile gauze bandages  ¨ Clear adhesive dressing or medical tape  ¨ Skin barrier  ¨ Protective skin film  ¨ Trash bag  · Remove the old bandage, and check the tube entry site  ¨ Have the patient lie on his side with the nephrostomy tube entry site facing up  Place the under-pad where it will catch drainage as you are working with the nephrostomy tube  ¨ Wash your hands with soap and water  Put on new medical gloves  ¨ Gently remove the old bandage, without pulling on the tube  Do this by holding the skin beside the tube with one hand  With the other hand, gently remove sticky tape and the skin barrier by pulling in the same direction as hair growth  Do not touch the side of the bandage that is placed over or around the tube  Throw the bandage and skin barrier away in a trash bag  ¨ Look for signs of infection, such as skin redness and swelling  Report any skin changes to healthcare providers  ¨ Clean the tube entry site      ¨ Hold the tube in place to keep it from being pulled out while you are cleaning around it  ¨ You will need to clean the area twice  For the first cleaning, wet a new gauze bandage with soap and water  Begin at the entry site of the tube  Wipe the skin in circles, moving away from the entry site  Remove blood and any other material with the gauze  Do this as often as needed  Use a new gauze bandage each time you clean the area, moving away from the entry site  ¨ For the second cleaning, wet a new gauze bandage with water  Begin at the entry site of the tube  Wipe the skin in circles, moving away from the entry site  Use a new gauze bandage each time you clean the area, moving away from the entry site  ¨ Gently pat the skin with a clean washcloth to dry it  · Apply the skin barrier and bandages  ¨ Roll up a bandage to make it thick, and place it under  the place where the tube enters the skin  Place it to support the tube, and stop it from kinking or bending  Tape the bandage in place, and apply more bandages if directed by a healthcare provider  ¨ Bring the tubing forward to the front and tape it to the skin  Do not stretch the tube tight, because this may pull the nephrostomy tube out  How often to change the bandage  Change the bandage around the tube, every other day  If your bandages  get dirty or wet, change them right away, and as often as needed  If your nephrostomy tube is to be used for a long period of time, the tube needs to be changed every 2 to 3 months  Healthcare providers will tell you when you need to make an appointment to have your tube changed  How to care for the urine drainage bag:   · Ask if you need to measure and write down how much urine is in the bag before you empty it  Drain urine out of the drainage bag when it is ½ to ? full  Open the spout at the bottom of the bag to empty the urine into the toilet  · You may need to detach the drainage bag from the nephrostomy tube to change it    If so, attach a new drainage bag tightly to the nephrostomy tube  ·   How to prevent problems with your nephrostomy tube:   · Change bandages, directed  This helps to prevent infection  Throw away or clean your drainage bag as directed by your healthcare provider  · Wipe the connecting ends of the drainage bag with alcohol before you reconnect the bag to the tube  This helps prevent infection  Keep the tube taped to your skin and connected to a drainage bag placed below the level of your kidneys  This helps prevent urine from backing up into your kidneys  You may wear a small drainage bag strapped to your leg to let you move around more easily  · Check the catheter to be sure it is in place after you change your clothes or do other activities  Do not wear tight clothing over the tube  Place the tubing over your thigh rather than under it when you are sitting down  Be sure that nothing is pulling on the nephrostomy tube when you move around  · Change positions if you see little or no urine in your drainage bag  Check to see if the urine tube is twisted or bent  Be sure that you are not sitting or lying on the tube  If there are no kinks and there is little or no urine in the drainage bag, tell your healthcare provider  · Flush out the tube as directed  Some tubes get flushed one time a day with 10 mls of NSS You will be given a prescription for the flushes  To flush the nephrostomy tube, clean both connections with alcohol swap  Twist off the drainage bag tube and twist the saline syringe into the nephrostomy tube and flush briskly  Remove the syringe and twist the drainage bag tube back into the nephrostomy tube  · Keep the site covered while you shower  Tape a piece of clear adhesive plastic over the dressing to keep it dry while you shower  Do not take tub baths      Contact Interventional Radiology at [phone] [facility] PATIENTS: Contact Interventional Radiology at [phone]) [facility] PATIENTS: Contact Interventional Radiology at [phone]) if:  · The skin around the nephrostomy tube is red, swollen, itches, or has a rash  · You have a fever greater than 101 or chills  · You have lower back or hip pain  · There are changes in how your urine looks or smells  · You have little or no urine draining from the nephrostomy tube  · You have nausea and are vomiting  · The black amado on your tube has moved, or the tube is longer than when it was put in    · You have questions or concerns about your condition or care  · The nephrostomy tube comes out completely  · There is blood, pus, or a bad smell coming from the place where the tube enters your skin  · Urine is leaking around the tube  The following pharmacies carry the flush syringes  [facility]                     [facility]       [address]  Phone [phone]            Phone [phone]  [address] & [facility]                      [address]                                 [phone]  Phone [phone]            Phone [phone]    [facility]                                                                         [facility] [phone]  [address]   Phone [phone]

## 2022-01-05 NOTE — TELEPHONE ENCOUNTER
Outpatient lab in [facility] - [place], [place]- patient is currently there to get CBC labs drawn and needs a script for Protime placed

## 2022-01-05 NOTE — SEDATION DOCUMENTATION
Retrograde ileal conduit exchanged by Dr Angelique Benitez without complications  Procedure tolerated well by patient

## 2022-01-06 ENCOUNTER — TELEPHONE (OUTPATIENT)
Dept: HEMATOLOGY ONCOLOGY | Facility: CLINIC | Age: 77
End: 2022-01-06

## 2022-01-06 LAB
FERRITIN SERPL-MCNC: 556 NG/ML (ref 8–388)
IRON SATN MFR SERPL: 28 % (ref 15–50)
IRON SERPL-MCNC: 53 UG/DL (ref 50–170)
TIBC SERPL-MCNC: 187 UG/DL (ref 250–450)

## 2022-01-06 NOTE — TELEPHONE ENCOUNTER
Left voicemail for son letting him know Erendira's potassium is low on yesterday's labs  Requested call back to discuss

## 2022-01-06 NOTE — TELEPHONE ENCOUNTER
----- Message from Eugenio Romo PA-C sent at 1/6/2022 12:06 PM EST -----  Patient' potassium low  Is she on any potassium? Diarrhea? On diuretic? Encourage potassium rich foods,  we can intiate potassium 20 meq daily if not on already    Patient to f/u with PCP

## 2022-01-07 DIAGNOSIS — E87.6 HYPOKALEMIA: Primary | ICD-10-CM

## 2022-01-07 RX ORDER — POTASSIUM CHLORIDE 750 MG/1
20 CAPSULE, EXTENDED RELEASE ORAL DAILY
Qty: 60 CAPSULE | Refills: 1 | Status: SHIPPED | OUTPATIENT
Start: 2022-01-07

## 2022-01-07 NOTE — TELEPHONE ENCOUNTER
Received call back from pt's daughter regarding hypokalemia  Denies pt is having any diarrhea or vomiting  Will pend script for Potassium to pt's pharamcy  She is now staying with daughter in [facility] - requesting script go to [facility] on [facility]  Daughter had multiple questions about her meds - advised we prescribe the Eliquis so I can help with that, and questions about other meds should be directed to PCP  Per daughter pt has been having significant bleeding from dialysis cath after dialysis and she is questioning if she should still be on Eliquis  Reports she is taking it only once a day  Will review with Dr Patricia Diaz

## 2022-01-07 NOTE — TELEPHONE ENCOUNTER
Okay for Eliquis once a day on every other day  Daughter notified  She will let us know if bleeding does not improve

## 2022-01-22 ENCOUNTER — TELEPHONE (OUTPATIENT)
Dept: OTHER | Facility: OTHER | Age: 77
End: 2022-01-22

## 2022-01-22 NOTE — TELEPHONE ENCOUNTER
Caller states pt has unusual tube in place (nephrostomy in stoma) and caller has questions about removal of tube as patient needs to be send to dialysis quickly

## 2022-01-25 ENCOUNTER — TELEPHONE (OUTPATIENT)
Dept: HEMATOLOGY ONCOLOGY | Facility: CLINIC | Age: 77
End: 2022-01-25

## 2022-01-25 NOTE — TELEPHONE ENCOUNTER
I phoned the patient's daughter-in-law, Nico Brown, and provided names of Oncologists at [facility], per Jack Shaffer PA-C (Dr Vianey Prakash, Dr Benny Rodriguez, Dr Cornell Calvin)  Nico Brown was thankful for the information

## 2022-01-25 NOTE — TELEPHONE ENCOUNTER
Patient's daughter in law Nia Tripp called to cancel patient's appt tomorrow at 1:00pm  Patient  is admitted to the [facility] in [facility]  Nia Tripp would like a recommendation on a Oncologist in that are  Patient will be moving and no longer seeing our doctors   Nia Tripp can be reached at [phone]

## 2022-02-15 RX ORDER — CITALOPRAM 20 MG/1
TABLET ORAL
COMMUNITY
Start: 2022-01-18

## 2022-02-16 ENCOUNTER — OFFICE VISIT (OUTPATIENT)
Dept: VASCULAR SURGERY | Facility: CLINIC | Age: 77
End: 2022-02-16
Payer: COMMERCIAL

## 2022-02-16 VITALS
WEIGHT: 157 LBS | RESPIRATION RATE: 18 BRPM | BODY MASS INDEX: 30.82 KG/M2 | TEMPERATURE: 96.9 F | SYSTOLIC BLOOD PRESSURE: 118 MMHG | HEIGHT: 60 IN | HEART RATE: 95 BPM | DIASTOLIC BLOOD PRESSURE: 58 MMHG

## 2022-02-16 DIAGNOSIS — Z99.2 ESRD (END STAGE RENAL DISEASE) ON DIALYSIS (HCC): Primary | ICD-10-CM

## 2022-02-16 DIAGNOSIS — N18.6 ESRD (END STAGE RENAL DISEASE) ON DIALYSIS (HCC): Primary | ICD-10-CM

## 2022-02-16 DIAGNOSIS — I77.0 AVF (ARTERIOVENOUS FISTULA) (HCC): ICD-10-CM

## 2022-02-16 DIAGNOSIS — R60.0 EDEMA OF RIGHT UPPER EXTREMITY: ICD-10-CM

## 2022-02-16 DIAGNOSIS — I87.1 SUBCLAVIAN VEIN STENOSIS, RIGHT: ICD-10-CM

## 2022-02-16 PROCEDURE — 99215 OFFICE O/P EST HI 40 MIN: CPT | Performed by: SURGERY

## 2022-02-16 NOTE — PATIENT INSTRUCTIONS
If you start having swelling of the arm again or long bleeding after dialysis, this likely means that the blockage in the vein has returned and you need another fistulogram    To your new doctors:  -vein mapping '20 shows chronic SVT in the B cephalic veins just above the AC fossa; L basilic is inadequate size; R basilic appears adequate size in the upper arm; brachial bifur at the [facility] fossa; no evidence of R central occlusion  -s/p creation of right brachiobasilic SFSVWYC 4/0/40  -s/p fistulogram w/ R ax/subclavian PTA to 10mm ([place] 2/10/21)  -s/p R brachiobasilic fistula transposition and superficialization 9/30/21  -s/p fistulogram w/ R subclavian PTA to 10mm 10/27/21  -s/p fistulogram w/ R subclavian PTA to 10mm 2/28/22 [facility]  -significant improvement in edema; excellent thrill/bruit today 2/16/22; still having some prolonged bleeding

## 2022-02-16 NOTE — PROGRESS NOTES
Assessment/Plan:    Pt is a 71 yo F w/ bladder surgery '16, c/b incarcerated parastomal hernia s/p ex lap 8/20, recurrant SBO, hypothyroidism, hyperparathyroidism, hx of PE '06 (on ppx dose eliquis), HTN, meningioma, MDD, polycythemia vera, hx of SDH after fall?, CKD, presents for dialysis access evaluation  ESRD (end stage renal disease) on dialysis [facility])  Edema of right upper extremity  Subclavian vein stenosis, right  AVF (arteriovenous fistula) ([address] )  -vein mapping '20 shows chronic SVT in the B cephalic veins just above the AC fossa; L basilic is inadequate size; R basilic appears adequate size in the upper arm; brachial bifur at the [facility] fossa; no evidence of R central occlusion  -s/p creation of right brachiobasilic CFPYJTS 2/7/23  -s/p fistulogram w/ R ax/subclavian PTA to 10mm ([place] 2/10/21)  -s/p R brachiobasilic fistula transposition and superficialization 9/30/21  -s/p fistulogram w/ R subclavian PTA to 10mm 10/27/21  -s/p fistulogram w/ R subclavian PTA to 10mm 2/28/22 [facility]  -significant improvement in edema; excellent thrill/bruit today 2/16/22; still having some prolonged bleeding  -if swelling returned, would need repeat fistulogram; consider subclavian vein stenting given multiple recurrent stenoses  -patient has upcoming move to live with her son and will be continuing her care with [facility] who performed her recent fistulogram; I will always be available PRN; encourage patient to sign up for [facility] to have access to past care and have also written out her vascular surgical hx on her summary to bring to her new physicians        Subjective:      Patient ID: Omari Campos is a 77 y o  female  Patient is s/p Superficialization and transposition of RUE on 9/30/21 by Dr Toyin Sparks Doctor  Pt had a Fgram on 10/27/21 by Dr Rafael Seo  Pt states that her Rt arm is feeling well since she had an Fgram on 1/28/22 at [facility]  Pt denies numbness, tingling, or loss of motion  Pt denies fevers or chills   Pt goes to HD on TTS at [facility]  There is a thrill and bruit  Pt is on Eliquis every other day and Atorvastatin  HPI:    Patient presents for followup after RUE fistula creation and superficialization  R-handed  Had bladder surgery originally for urinary incontinance '16  Had incarcerated parastomal hernia s/p ex-lap, reduction and parastomal hernia repair with Phasix mesh  Has had prolonged recovery from this  In '21, she had a SBO and this was treated medically  She also had nephrostomy tubes and needed tube exchanges  Had a grown autistic son who passed in July  She is s/p R brachiobasilic fistula  Now s/p superficialization  She had recurrent R subclavian vein stenosis [facility]/p [facility] and a couple of weeks ago at [facility] for recurrent disease  Currently with minimal RUE edema  Continues to have access site bleeding issues but also is on Eliquis  She is taking 2 5mg every other day currently  Catheter is now removed  Denies any hand symptoms, denies numbness/tingling/weakness  Denies CP or cardiac history  Complains of SOB with activity  Currently using walker at home or wheelchair out of the house  The following portions of the patient's history were reviewed and updated as appropriate: allergies, current medications, past family history, past medical history, past social history, past surgical history and problem list     Review of Systems   Constitutional: Negative  HENT: Negative  Eyes: Negative  Respiratory: Negative  Negative for shortness of breath  Cardiovascular: Positive for leg swelling  Negative for chest pain  Gastrointestinal: Negative  Endocrine: Negative  Genitourinary: Negative  Musculoskeletal: Positive for arthralgias and gait problem (uses walker)  Skin: Negative  Allergic/Immunologic: Negative  Neurological: Negative for dizziness, weakness and numbness  Hematological: Bruises/bleeds easily  Psychiatric/Behavioral: Negative  Objective:      /58 (BP Location: Left arm, Patient Position: Sitting)   Pulse 95   Temp (!) 96 9 °F (36 1 °C) (Tympanic)   Resp 18   Ht 5' (1 524 m)   Wt 71 2 kg (157 lb)   BMI 30 66 kg/m²          Physical Exam  Vitals and nursing note reviewed  Constitutional:       Appearance: She is well-developed  HENT:      Head: Normocephalic and atraumatic  Eyes:      Conjunctiva/sclera: Conjunctivae normal    Cardiovascular:      Rate and Rhythm: Normal rate and regular rhythm  Pulses:           Radial pulses are 0 on the right side  Dorsalis pedis pulses are 2+ on the right side and 2+ on the left side  Posterior tibial pulses are 0 on the right side and 0 on the left side  Heart sounds: Murmur heard  Comments: No carotid bruits B    R B/B fistula w/ excellent thrill and bruit  Pulmonary:      Effort: Pulmonary effort is normal  No respiratory distress  Breath sounds: Normal breath sounds  No wheezing or rales  Abdominal:      General: There is no distension  Palpations: Abdomen is soft  Tenderness: There is no abdominal tenderness  There is no rebound  Musculoskeletal:         General: Normal range of motion  Cervical back: Normal range of motion and neck supple  Right lower le+ Edema present  Left lower le+ Edema present  Skin:     General: Skin is warm and dry  Comments: R antecub incision well healed  R upper arm incision well healed  Very mild edema of the RUE  Scattered ecchymoses BUE, R>L   Neurological:      Mental Status: She is alert and oriented to person, place, and time  Psychiatric:         Behavior: Behavior normal            I have reviewed and made appropriate changes to the review of systems input by the medical assistant      Vitals:    22 1138   BP: 118/58   BP Location: Left arm   Patient Position: Sitting   Pulse: 95   Resp: 18   Temp: (!) 96 9 °F (36 1 °C)   TempSrc: Tympanic   Weight: 71 2 kg (157 lb)   Height: 5' (1 524 m)       Patient Active Problem List   Diagnosis    History of pulmonary embolism    Bladder mass    Small bowel obstruction (HCC)    Obstructive uropathy    Secondary hyperparathyroidism of renal origin ([facility] [address]    Essential hypertension    Polycythemia vera     Intraventricular hemorrhage (HCC)    Diarrhea    Anemia of chronic disease    Mass of urethra    Sepsis on admission    Thoracic compression fracture (HCC)    Benign neoplasm of supratentorial region of brain ([facility] [address]    [facility]    Inverted T wave    Polycythemia    History of Clostridioides difficile colitis    History of shingles    Depression    Class 2 severe obesity due to excess calories with serious comorbidity and body mass index (BMI) of 35 0 to 35 9 in adult [facility])    Chronic thrombosis of subclavian vein (HCC)    Hyperlipemia    Gross hematuria    Hydronephrosis of left kidney    Anemia    Constipation    Obstructive left pyelonephritis    Right upper quadrant cyst    Obesity, morbid (HCC)    Febrile illness    COVID-19    Edema of right upper extremity    Ambulatory dysfunction    Iron deficiency anemia due to chronic blood loss    Poor venous access    Perinephric abscess    Pleural effusion    Swelling of right upper extremity    Acute renal failure (HCC)    Hypothyroidism    Bacteremia    Hypercalcemia    ESRD (end stage renal disease) on dialysis [facility])       Past Surgical History:   Procedure Laterality Date    ABDOMINAL ADHESION SURGERY N/A 8/9/2020    Procedure: LYSIS ADHESIONS;  Surgeon: Jadon Sher DO;  Location: [facility] MAIN OR;  Service: General    BLADDER SUSPENSION      BOTOX INJECTION N/A 7/27/2016    Procedure: BOTOX INJECTION ;  Surgeon: Nely Wolf MD;  Location: [facility] Main OR;  Service:     CHOLECYSTECTOMY N/A     COLONOSCOPY      CYSTECTOMY, RADICAL WITH ILEOCONDUIT N/A 10/4/2016    Procedure: SUPRATRIGONAL CYSTECTOMY WITH ILEAL CONDUIT ;  Surgeon: Tessa Mcrae MD;  Location: [facility];  Service:    Mavis Cousin CYSTOSCOPY W/ RETROGRADES Bilateral 7/27/2016    Procedure: CYSTOSCOPY; RETROGRADE PYELOGRAM ;  Surgeon: Tessa Mcrae MD;  Location: [facility];  Service:     HERNIA REPAIR      IR AV FISTULAGRAM/GRAFTOGRAM  2/10/2021    IR AV FISTULAGRAM/GRAFTOGRAM  10/27/2021    IR DRAINAGE TUBE CHECK/CHANGE/REPOSITION/REINSERTION/UPSIZE  10/27/2021    IR DRAINAGE TUBE PLACEMENT  10/13/2021    IR DRAINAGE TUBE PLACEMENT  11/1/2021    IR NEPHROSTOMY TO NEPHROURETERAL STENT  5/15/2021    IR NEPHROSTOMY TO NEPHROURETERAL STENT  6/9/2021    IR NEPHROSTOMY TO NEPHROURETERAL STENT  1/5/2022    IR NEPHROSTOMY TUBE CHECK AND/OR REMOVAL  6/16/2021    IR NEPHROSTOMY TUBE CHECK/CHANGE/REPOSITION/REINSERTION/UPSIZE  5/14/2021    IR NEPHROSTOMY TUBE CHECK/CHANGE/REPOSITION/REINSERTION/UPSIZE  5/21/2021    IR NEPHROSTOMY TUBE CHECK/CHANGE/REPOSITION/REINSERTION/UPSIZE  9/16/2021    IR NEPHROSTOMY TUBE CHECK/CHANGE/REPOSITION/REINSERTION/UPSIZE  10/4/2021    IR NEPHROSTOMY TUBE PLACEMENT  5/10/2021    IR NEPHROSTOMY TUBE PLACEMENT  9/20/2021    IR NON-TUNNELED CENTRAL LINE PLACEMENT  7/17/2020    IR NON-TUNNELED CENTRAL LINE PLACEMENT  8/14/2020    IR NON-TUNNELED CENTRAL LINE PLACEMENT  7/16/2021    IR NON-TUNNELED CENTRAL LINE PLACEMENT  10/11/2021    IR THORACENTESIS  10/11/2021    IR TUNNELED DIALYSIS CATHETER PLACEMENT  10/20/2021    LAPAROTOMY N/A 8/9/2020    Procedure: LAPAROTOMY EXPLORATORY, PARASTOMAL HERNIA REPAIR WITH MESH;  Surgeon: David Salmeron DO;  Location: [facility];  Service: General    IA ANASTOMOSIS,AV,ANY SITE Right 1/5/2021    Procedure: Creation of right brachiobasilic fistula;   Surgeon: Ke Chairez MD;  Location: [facility];  Service: Vascular    IA COLONOSCOPY FLX DX W/COLLJ SPEC WHEN PFRMD N/A 8/31/2016    Procedure: COLONOSCOPY;  Surgeon: Linden Hermosillo MD;  Location:  GI LAB;  Service: Gastroenterology    NJ CYSTOSCOPY,INSERT URETERAL STENT Bilateral 7/27/2016    Procedure: STENT INSERTION; EXCISION OF MESH ;  Surgeon: Timoteo Franco MD;  Location: [facility] Main OR;  Service: Urology    NJ REVISE AV FISTULA,W/O THROMBECTOMY Right 9/30/2021    Procedure: Superficialization and transposition of right brachiobasilic fistula; Surgeon: Mimi Chairez MD;  Location: [facility] OR;  Service: Vascular    TONSILLECTOMY      TUBAL LIGATION      WISDOM TOOTH EXTRACTION         Family History   Problem Relation Age of Onset    Cancer Mother         small cell cancer     Heart disease Father     COPD Father     Heart disease Brother     Nephrolithiasis Brother        Social History     Socioeconomic History    Marital status:      Spouse name: Not on file    Number of children: Not on file    Years of education: Not on file    Highest education level: Not on file   Occupational History    Not on file   Tobacco Use    Smoking status: Never Smoker    Smokeless tobacco: Never Used    Tobacco comment: n/a   Vaping Use    Vaping Use: Never used   Substance and Sexual Activity    Alcohol use: Not Currently     Comment: n/s    Drug use: Not Currently     Comment: n/a    Sexual activity: Not Currently     Partners: Male   Other Topics Concern    Not on file   Social History Narrative    Not on file     Social Determinants of Health     Financial Resource Strain: Not on file   Food Insecurity: Not on file   Transportation Needs: Not on file   Physical Activity: Not on file   Stress: Not on file   Social Connections: Not on file   Intimate Partner Violence: Not on file   Housing Stability: Not on file       Allergies   Allergen Reactions    Chlorhexidine Rash     petichi like rash when using chlorhexidine swabs prior to IV            Current Outpatient Medications:     acetaminophen (TYLENOL) 325 mg tablet, 650 mg every 6 hours as needed for mild pain or headache, Disp: 30 tablet, Rfl: 0    apixaban (Eliquis) 2 5 mg, Take 1 tablet (2 5 mg total) by mouth 2 (two) times a day (Patient taking differently: Take 2 5 mg by mouth every other day  ), Disp: 28 tablet, Rfl: 0    atorvastatin (LIPITOR) 40 mg tablet, Take 1 tablet (40 mg total) by mouth daily at bedtime, Disp: 14 tablet, Rfl: 0    citalopram (CeleXA) 20 mg tablet, , Disp: , Rfl:     levothyroxine 75 mcg tablet, Take 1 tablet (75 mcg total) by mouth daily, Disp: 14 tablet, Rfl: 0    loperamide (IMODIUM) 2 mg capsule, Take 1 capsule (2 mg total) by mouth 4 (four) times a day as needed for diarrhea, Disp: 12 capsule, Rfl: 0    melatonin 3 mg, Take 1 tablet (3 mg total) by mouth daily at bedtime, Disp: 30 tablet, Rfl: 0    Probiotic Product (PROBIOTIC ACIDOPHILUS BEADS PO), Take by mouth, Disp: , Rfl:     calcitriol (ROCALTROL) 0 25 mcg capsule, Take 1 capsule (0 25 mcg total) by mouth daily (Patient not taking: Reported on 2/16/2022 ), Disp: 14 capsule, Rfl: 0    citalopram (CeleXA) 40 mg tablet, Take 1 tablet (40 mg total) by mouth daily (Patient not taking: Reported on 2/16/2022 ), Disp: 14 tablet, Rfl: 0    metoprolol tartrate (LOPRESSOR) 25 mg tablet, Take 1 tablet (25 mg total) by mouth 2 (two) times a day (Patient not taking: Reported on 2/16/2022 ), Disp: 28 tablet, Rfl: 0    midodrine (PROAMATINE) 5 mg tablet, Take 1 tablet (5 mg total) by mouth 3 (three) times a day before meals (Patient not taking: Reported on 2/16/2022 ), Disp: 42 tablet, Rfl: 0    multivitamin-iron-minerals-folic acid (THERAPEUTIC-M) TABS tablet, Take 1 tablet by mouth daily (Patient not taking: Reported on 2/16/2022 ), Disp: , Rfl:     pantoprazole (PROTONIX) 40 mg tablet, Take 1 tablet (40 mg total) by mouth 2 (two) times a day before meals (Patient not taking: Reported on 2/16/2022 ), Disp: 28 tablet, Rfl: 0    polyethylene glycol (MIRALAX) 17 g packet, Take 17 g by mouth every other day (Patient not taking: Reported on 2/16/2022 ), Disp: , Rfl:   potassium chloride (MICRO-K) 10 MEQ CR capsule, Take 2 capsules (20 mEq total) by mouth daily (Patient not taking: Reported on 2/16/2022 ), Disp: 60 capsule, Rfl: 1    saccharomyces boulardii (Florastor) 250 mg capsule, Take 1 capsule (250 mg total) by mouth daily (Patient not taking: Reported on 2/16/2022 ), Disp: 14 capsule, Rfl: 0    senna-docusate sodium (SENOKOT-S) 8 6-50 mg per tablet, Take 1 tablet by mouth 2 (two) times a day (Patient not taking: Reported on 2/16/2022 ), Disp: , Rfl:

## 2022-02-23 ENCOUNTER — TELEPHONE (OUTPATIENT)
Dept: UROLOGY | Facility: AMBULATORY SURGERY CENTER | Age: 77
End: 2022-02-23

## 2022-02-23 NOTE — TELEPHONE ENCOUNTER
Patient's daughter in law is calling to cancel appointment for 3/9/22 with [facility] due to moving out of area  Wants to know if there are any recommendations for Urologist in the [facility] [place] area

## 2022-05-04 ENCOUNTER — TELEPHONE (OUTPATIENT)
Dept: HEMATOLOGY ONCOLOGY | Facility: CLINIC | Age: 77
End: 2022-05-04

## 2022-05-04 NOTE — TELEPHONE ENCOUNTER
Viviann Nageotte calling from [employer] to confirm if we received request for patients notes  I confirm fax number  I also gave her correct fax number

## 2022-10-03 NOTE — TELEPHONE ENCOUNTER
Pt calling- having issues w catheter  Would like to know different provider for catheter supplies    Please call back: [phone] [facility] Pregnancy And Lactation Text: There is insufficient data to evaluate drug-associated risk for major birth defects, miscarriage, or other adverse maternal or fetal outcomes.  There is a pregnancy registry that monitors pregnancy outcomes in pregnant persons exposed to the medication during pregnancy.  It is unknown if this medication is excreted in breast milk.  Do not breastfeed during treatment and for about 4 weeks after the last dose.

## 2022-10-12 PROBLEM — N11.1 OBSTRUCTIVE PYELONEPHRITIS: Status: RESOLVED | Noted: 2021-05-14 | Resolved: 2022-10-12

## 2022-10-12 PROBLEM — A41.9 SEPSIS (HCC): Status: RESOLVED | Noted: 2019-05-23 | Resolved: 2022-10-12

## 2022-11-04 ENCOUNTER — RA CDI HCC (OUTPATIENT)
Dept: OTHER | Facility: HOSPITAL | Age: 77
End: 2022-11-04

## 2022-11-15 ENCOUNTER — RA CDI HCC (OUTPATIENT)
Dept: OTHER | Facility: HOSPITAL | Age: 77
End: 2022-11-15

## 2022-11-15 NOTE — PROGRESS NOTES
[address] DOS 7/7/21    N18 6/Z99 2 on problem list  N18 5 noted and billed on DOS  Dialysis not in place at [facility]   Fistula created 1/5/21

## 2023-04-08 NOTE — TELEPHONE ENCOUNTER
Report of bags leaking is secondary to issues with adhesion  Patient should make sure skin around stoma is clean and dry  Can place referral for wound care, or have patient come in for nurse visit to assess technique and provide instructions  Can also consider discussing with Joaquín Virk, urology nurse navigator  DISPLAY PLAN FREE TEXT

## 2024-10-15 NOTE — PROGRESS NOTES
[address] [facility] audit for 9/15/21 Z99 5  On dos pt not on dialysis  Audit form updated
Age/Coagulation/Other

## (undated) DEVICE — 1/2 FORCE SURGICAL SPRING CLIP: Brand: STEALTH® SPRING CLIP

## (undated) DEVICE — 3000CC GUARDIAN II: Brand: GUARDIAN

## (undated) DEVICE — JACKSON-PRATT 100CC BULB RESERVOIR: Brand: CARDINAL HEALTH

## (undated) DEVICE — 1200CC GUARDIAN II: Brand: GUARDIAN

## (undated) DEVICE — GLOVE SRG BIOGEL ECLIPSE 6

## (undated) DEVICE — BULB SYRINGE,IRRIGATION WITH PROTECTIVE CAP: Brand: DOVER

## (undated) DEVICE — SUT SILK 2-0 TIES 144 IN LA55G

## (undated) DEVICE — ULTRASOUND GEL STERILE FOIL PK

## (undated) DEVICE — SUT VICRYL 2-0 CT-1 27 IN J259H

## (undated) DEVICE — SUT VICRYL 3-0 SH 27 IN J416H

## (undated) DEVICE — SURGICEL 4 X 8

## (undated) DEVICE — SUT SILK 2-0 18 IN A185H

## (undated) DEVICE — DRESSING MEPILEX AG BORDER 4 X 8 IN

## (undated) DEVICE — GAUZE SPONGES,16 PLY: Brand: CURITY

## (undated) DEVICE — CHLORAPREP HI-LITE 26ML ORANGE

## (undated) DEVICE — PETRI DISH STERILE

## (undated) DEVICE — 3M™ TEGADERM™ TRANSPARENT FILM DRESSING FRAME STYLE, 1626W, 4 IN X 4-3/4 IN (10 CM X 12 CM), 50/CT 4CT/CASE: Brand: 3M™ TEGADERM™

## (undated) DEVICE — GLOVE INDICATOR PI UNDERGLOVE SZ 6.5 BLUE

## (undated) DEVICE — IV CATH INTROCAN 18G X 1 1/4 SAFETY

## (undated) DEVICE — SUT MONOCRYL 4-0 PS-2 27 IN Y426H

## (undated) DEVICE — SUT SILK 3-0 18 IN A184H

## (undated) DEVICE — SUT SILK 4-0 18 IN A183H

## (undated) DEVICE — PLUMEPEN PRO 10FT

## (undated) DEVICE — LIGACLIP MCA MULTIPLE CLIP APPLIERS, 20 SMALL CLIPS: Brand: LIGACLIP

## (undated) DEVICE — INTENDED FOR TISSUE SEPARATION, AND OTHER PROCEDURES THAT REQUIRE A SHARP SURGICAL BLADE TO PUNCTURE OR CUT.: Brand: BARD-PARKER SAFETY BLADES SIZE 15, STERILE

## (undated) DEVICE — SUT SILK 3-0 SH 30 IN K832H

## (undated) DEVICE — INTENDED FOR TISSUE SEPARATION, AND OTHER PROCEDURES THAT REQUIRE A SHARP SURGICAL BLADE TO PUNCTURE OR CUT.: Brand: BARD-PARKER ® CARBON RIB-BACK BLADES

## (undated) DEVICE — ABDOMINAL PAD: Brand: DERMACEA

## (undated) DEVICE — GLOVE SRG BIOGEL 7

## (undated) DEVICE — SUT PDS II 1 CTX 36 IN Z371T

## (undated) DEVICE — SUT VICRYL 2-0 REEL 54 IN J286G

## (undated) DEVICE — IV CATH 18 G X 1.16 IN

## (undated) DEVICE — VESSEL LOOPS X-RAY DETECTABLE: Brand: DEROYAL

## (undated) DEVICE — NEEDLE 25G X 1 1/2

## (undated) DEVICE — JP CHAN DRN SIL HUBLESS 19FR W/TRO: Brand: CARDINAL HEALTH

## (undated) DEVICE — MEDI-VAC YANK SUCT HNDL W/TPRD BULBOUS TIP: Brand: CARDINAL HEALTH

## (undated) DEVICE — INTENDED FOR TISSUE SEPARATION, AND OTHER PROCEDURES THAT REQUIRE A SHARP SURGICAL BLADE TO PUNCTURE OR CUT.: Brand: BARD-PARKER SAFETY BLADES SIZE 10, STERILE

## (undated) DEVICE — ACE WRAP 6 IN UNSTERILE

## (undated) DEVICE — ADHESIVE SKIN HIGH VISCOSITY EXOFIN 1ML

## (undated) DEVICE — SPONGE LAP 18 X 18 IN

## (undated) DEVICE — PROXIMATE PLUS MD MULTI-DIRECTIONAL RELEASE SKIN STAPLERS CONTAINS 35 STAINLESS STEEL STAPLES APPROXIMATE CLOSED DIMENSIONS: 6.9MM X 3.9MM WIDE: Brand: PROXIMATE

## (undated) DEVICE — BETHLEHEM MAJOR GENERAL PACK: Brand: CARDINAL HEALTH

## (undated) DEVICE — GLOVE INDICATOR PI UNDERGLOVE SZ 7.5 BLUE

## (undated) DEVICE — 3M™ TEGADERM™ TRANSPARENT FILM DRESSING FRAME STYLE, 1628, 6 IN X 8 IN (15 CM X 20 CM), 10/CT 8CT/CASE: Brand: 3M™ TEGADERM™

## (undated) DEVICE — STRL BETHLEHEM A V FISTULA PK: Brand: CARDINAL HEALTH

## (undated) DEVICE — BAG DECANTER

## (undated) DEVICE — SUT PDS II 3-0 SH 27 IN Z316H